# Patient Record
Sex: MALE | Race: OTHER | Employment: OTHER | URBAN - METROPOLITAN AREA
[De-identification: names, ages, dates, MRNs, and addresses within clinical notes are randomized per-mention and may not be internally consistent; named-entity substitution may affect disease eponyms.]

---

## 2017-06-16 ENCOUNTER — HOSPITAL ENCOUNTER (OUTPATIENT)
Age: 77
Discharge: HOME | End: 2017-06-16
Payer: MEDICARE

## 2017-06-16 DIAGNOSIS — N18.30 BENIGN HYPERTENSION WITH CKD (CHRONIC KIDNEY DISEASE) STAGE III (HCC): ICD-10-CM

## 2017-06-16 DIAGNOSIS — I12.9 BENIGN HYPERTENSION WITH CKD (CHRONIC KIDNEY DISEASE) STAGE III (HCC): ICD-10-CM

## 2017-06-16 DIAGNOSIS — N18.30 CKD (CHRONIC KIDNEY DISEASE) STAGE 3, GFR 30-59 ML/MIN (HCC): ICD-10-CM

## 2017-06-16 DIAGNOSIS — E78.00 PURE HYPERCHOLESTEROLEMIA: ICD-10-CM

## 2017-06-16 LAB
ABSOLUTE EOS #: 0.2 K/UL (ref 0–0.4)
ABSOLUTE LYMPH #: 1.4 K/UL (ref 1–4.8)
ABSOLUTE MONO #: 0.5 K/UL (ref 0.1–1.2)
ALBUMIN SERPL-MCNC: 4.2 G/DL (ref 3.5–5.2)
ALBUMIN/GLOBULIN RATIO: 1.3 (ref 1–2.5)
ALP BLD-CCNC: 95 U/L (ref 40–129)
ALT SERPL-CCNC: 35 U/L (ref 5–41)
ANION GAP SERPL CALCULATED.3IONS-SCNC: 14 MMOL/L (ref 9–17)
AST SERPL-CCNC: 23 U/L
BASOPHILS # BLD: 1 %
BASOPHILS ABSOLUTE: 0.1 K/UL (ref 0–0.2)
BILIRUB SERPL-MCNC: 0.5 MG/DL (ref 0.3–1.2)
BILIRUBIN DIRECT: 0.13 MG/DL
BILIRUBIN, INDIRECT: 0.37 MG/DL (ref 0–1)
BUN BLDV-MCNC: 13 MG/DL (ref 8–23)
BUN/CREAT BLD: ABNORMAL (ref 9–20)
CALCIUM SERPL-MCNC: 9.7 MG/DL (ref 8.6–10.4)
CHLORIDE BLD-SCNC: 102 MMOL/L (ref 98–107)
CO2: 24 MMOL/L (ref 20–31)
CREAT SERPL-MCNC: 1.33 MG/DL (ref 0.7–1.2)
DIFFERENTIAL TYPE: NORMAL
EOSINOPHILS RELATIVE PERCENT: 3 %
GFR AFRICAN AMERICAN: >60 ML/MIN
GFR NON-AFRICAN AMERICAN: 52 ML/MIN
GFR SERPL CREATININE-BSD FRML MDRD: ABNORMAL ML/MIN/{1.73_M2}
GFR SERPL CREATININE-BSD FRML MDRD: ABNORMAL ML/MIN/{1.73_M2}
GLOBULIN: NORMAL G/DL (ref 1.5–3.8)
GLUCOSE BLD-MCNC: 109 MG/DL (ref 70–99)
HCT VFR BLD CALC: 45.1 % (ref 41–53)
HEMOGLOBIN: 15.7 G/DL (ref 13.5–17.5)
LYMPHOCYTES # BLD: 23 %
MAGNESIUM: 2.3 MG/DL (ref 1.6–2.6)
MCH RBC QN AUTO: 31.8 PG (ref 26–34)
MCHC RBC AUTO-ENTMCNC: 34.8 G/DL (ref 31–37)
MCV RBC AUTO: 91.5 FL (ref 80–100)
MONOCYTES # BLD: 9 %
PDW BLD-RTO: 14.1 % (ref 12.5–15.4)
PHOSPHORUS: 3.4 MG/DL (ref 2.5–4.5)
PLATELET # BLD: 215 K/UL (ref 140–450)
PLATELET ESTIMATE: NORMAL
PMV BLD AUTO: 7.7 FL (ref 6–12)
POTASSIUM SERPL-SCNC: 4.6 MMOL/L (ref 3.7–5.3)
RBC # BLD: 4.93 M/UL (ref 4.5–5.9)
RBC # BLD: NORMAL 10*6/UL
SEG NEUTROPHILS: 64 %
SEGMENTED NEUTROPHILS ABSOLUTE COUNT: 3.9 K/UL (ref 1.8–7.7)
SODIUM BLD-SCNC: 140 MMOL/L (ref 135–144)
TOTAL PROTEIN: 7.4 G/DL (ref 6.4–8.3)
WBC # BLD: 6 K/UL (ref 3.5–11)
WBC # BLD: NORMAL 10*3/UL

## 2017-06-16 PROCEDURE — 80048 BASIC METABOLIC PNL TOTAL CA: CPT

## 2017-06-16 PROCEDURE — 36415 COLL VENOUS BLD VENIPUNCTURE: CPT

## 2017-06-16 PROCEDURE — 84100 ASSAY OF PHOSPHORUS: CPT

## 2017-06-16 PROCEDURE — 80061 LIPID PANEL: CPT

## 2017-06-16 PROCEDURE — 85025 COMPLETE CBC W/AUTO DIFF WBC: CPT

## 2017-06-16 PROCEDURE — 83735 ASSAY OF MAGNESIUM: CPT

## 2017-06-16 PROCEDURE — 80076 HEPATIC FUNCTION PANEL: CPT

## 2017-06-20 LAB
CHOLESTEROL/HDL RATIO: 3.5
CHOLESTEROL: 155 MG/DL
HDLC SERPL-MCNC: 44 MG/DL
LDL CHOLESTEROL: 90 MG/DL (ref 0–130)
TRIGL SERPL-MCNC: 105 MG/DL
VLDLC SERPL CALC-MCNC: NORMAL MG/DL (ref 1–30)

## 2017-08-08 ENCOUNTER — CARE COORDINATION (OUTPATIENT)
Dept: CARE COORDINATION | Age: 77
End: 2017-08-08

## 2017-08-11 ENCOUNTER — CARE COORDINATION (OUTPATIENT)
Dept: CARE COORDINATION | Age: 77
End: 2017-08-11

## 2017-08-15 ENCOUNTER — CARE COORDINATION (OUTPATIENT)
Dept: CARE COORDINATION | Age: 77
End: 2017-08-15

## 2017-08-17 ENCOUNTER — CARE COORDINATION (OUTPATIENT)
Dept: CARE COORDINATION | Age: 77
End: 2017-08-17

## 2017-08-23 ENCOUNTER — CARE COORDINATION (OUTPATIENT)
Dept: CARE COORDINATION | Age: 77
End: 2017-08-23

## 2017-08-24 ENCOUNTER — CARE COORDINATION (OUTPATIENT)
Dept: CARE COORDINATION | Age: 77
End: 2017-08-24

## 2017-08-29 ENCOUNTER — CARE COORDINATION (OUTPATIENT)
Dept: CARE COORDINATION | Age: 77
End: 2017-08-29

## 2017-09-14 ENCOUNTER — CARE COORDINATION (OUTPATIENT)
Dept: CARE COORDINATION | Age: 77
End: 2017-09-14

## 2017-09-15 ENCOUNTER — OFFICE VISIT (OUTPATIENT)
Dept: GASTROENTEROLOGY | Age: 77
End: 2017-09-15
Payer: MEDICARE

## 2017-09-15 VITALS
OXYGEN SATURATION: 96 % | SYSTOLIC BLOOD PRESSURE: 134 MMHG | TEMPERATURE: 97.1 F | HEIGHT: 67 IN | BODY MASS INDEX: 28.11 KG/M2 | DIASTOLIC BLOOD PRESSURE: 71 MMHG | WEIGHT: 179.1 LBS | HEART RATE: 58 BPM

## 2017-09-15 DIAGNOSIS — Z86.010 HISTORY OF COLON POLYPS: Primary | ICD-10-CM

## 2017-09-15 DIAGNOSIS — R19.8 TENESMUS: ICD-10-CM

## 2017-09-15 DIAGNOSIS — R10.84 GENERALIZED ABDOMINAL PAIN: ICD-10-CM

## 2017-09-15 PROCEDURE — 99203 OFFICE O/P NEW LOW 30 MIN: CPT | Performed by: INTERNAL MEDICINE

## 2017-09-15 ASSESSMENT — ENCOUNTER SYMPTOMS
RECTAL PAIN: 0
SINUS PRESSURE: 0
VOMITING: 0
GASTROINTESTINAL NEGATIVE: 1
RESPIRATORY NEGATIVE: 1
CONSTIPATION: 0
SORE THROAT: 0
BACK PAIN: 1
NAUSEA: 0
ALLERGIC/IMMUNOLOGIC NEGATIVE: 1
BLOOD IN STOOL: 0
TROUBLE SWALLOWING: 1
DIARRHEA: 0
ANAL BLEEDING: 0
ABDOMINAL PAIN: 0

## 2017-09-18 ENCOUNTER — CARE COORDINATION (OUTPATIENT)
Dept: CARE COORDINATION | Age: 77
End: 2017-09-18

## 2017-09-18 ENCOUNTER — TELEPHONE (OUTPATIENT)
Dept: FAMILY MEDICINE CLINIC | Facility: CLINIC | Age: 77
End: 2017-09-18

## 2017-09-18 PROBLEM — Z12.11 COLON CANCER SCREENING: Status: ACTIVE | Noted: 2017-09-18

## 2017-09-19 ENCOUNTER — HOSPITAL ENCOUNTER (OUTPATIENT)
Age: 77
Setting detail: OUTPATIENT SURGERY
Discharge: HOME | End: 2017-09-19
Attending: INTERNAL MEDICINE | Admitting: INTERNAL MEDICINE
Payer: MEDICARE

## 2017-09-19 VITALS
HEART RATE: 54 BPM | BODY MASS INDEX: 28.09 KG/M2 | HEIGHT: 67 IN | OXYGEN SATURATION: 99 % | TEMPERATURE: 97.5 F | WEIGHT: 179 LBS | SYSTOLIC BLOOD PRESSURE: 137 MMHG | RESPIRATION RATE: 16 BRPM | DIASTOLIC BLOOD PRESSURE: 74 MMHG

## 2017-09-19 PROCEDURE — 7100000011 HC PHASE II RECOVERY - ADDTL 15 MIN: Performed by: INTERNAL MEDICINE

## 2017-09-19 PROCEDURE — 99153 MOD SED SAME PHYS/QHP EA: CPT | Performed by: INTERNAL MEDICINE

## 2017-09-19 PROCEDURE — 6360000002 HC RX W HCPCS: Performed by: INTERNAL MEDICINE

## 2017-09-19 PROCEDURE — C1773 RET DEV, INSERTABLE: HCPCS | Performed by: INTERNAL MEDICINE

## 2017-09-19 PROCEDURE — 3609010600 HC COLONOSCOPY POLYPECTOMY SNARE/COLD BIOPSY: Performed by: INTERNAL MEDICINE

## 2017-09-19 PROCEDURE — 2580000003 HC RX 258: Performed by: INTERNAL MEDICINE

## 2017-09-19 PROCEDURE — 88305 TISSUE EXAM BY PATHOLOGIST: CPT

## 2017-09-19 PROCEDURE — 99152 MOD SED SAME PHYS/QHP 5/>YRS: CPT | Performed by: INTERNAL MEDICINE

## 2017-09-19 PROCEDURE — 7100000010 HC PHASE II RECOVERY - FIRST 15 MIN: Performed by: INTERNAL MEDICINE

## 2017-09-19 RX ORDER — MIDAZOLAM HYDROCHLORIDE 1 MG/ML
INJECTION INTRAMUSCULAR; INTRAVENOUS PRN
Status: DISCONTINUED | OUTPATIENT
Start: 2017-09-19 | End: 2017-09-19 | Stop reason: HOSPADM

## 2017-09-19 RX ORDER — METHYLENE BLUE 10 MG/ML
INJECTION INTRAVENOUS PRN
Status: DISCONTINUED | OUTPATIENT
Start: 2017-09-19 | End: 2017-09-19 | Stop reason: HOSPADM

## 2017-09-19 RX ORDER — SODIUM CHLORIDE 9 MG/ML
INJECTION, SOLUTION INTRAVENOUS CONTINUOUS
Status: DISCONTINUED | OUTPATIENT
Start: 2017-09-19 | End: 2017-09-19 | Stop reason: HOSPADM

## 2017-09-19 RX ORDER — FENTANYL CITRATE 50 UG/ML
INJECTION, SOLUTION INTRAMUSCULAR; INTRAVENOUS PRN
Status: DISCONTINUED | OUTPATIENT
Start: 2017-09-19 | End: 2017-09-19 | Stop reason: HOSPADM

## 2017-09-19 RX ADMIN — SODIUM CHLORIDE: 9 INJECTION, SOLUTION INTRAVENOUS at 09:57

## 2017-09-19 ASSESSMENT — PAIN - FUNCTIONAL ASSESSMENT: PAIN_FUNCTIONAL_ASSESSMENT: 0-10

## 2017-09-19 ASSESSMENT — PAIN SCALES - GENERAL
PAINLEVEL_OUTOF10: 0
PAINLEVEL_OUTOF10: 0

## 2017-09-19 NOTE — CARE COORDINATION
Julieta Maria NP   atorvastatin (LIPITOR) 40 MG tablet Take 1 tablet by mouth daily 8/14/17   Lawanda Rice NP   amLODIPine (NORVASC) 5 MG tablet Take 0.5 tablets by mouth daily 8/14/17   Lawanda Rice NP   lansoprazole (PREVACID) 30 MG delayed release capsule Take 1 capsule by mouth daily 8/11/17   Lawanda Rice NP   ammonium lactate (LAC-HYDRIN) 12 % lotion Apply topically daily. 11/15/16   Lawanda Rice NP   NITROSTAT 0.4 MG SL tablet Place 0.4 mg under the tongue every 5 minutes as needed  3/13/15   Historical Provider, MD   doxepin (SINEQUAN) 100 MG capsule Take 100 mg by mouth nightly. Historical Provider, MD   amitriptyline (ELAVIL) 10 MG tablet Take 10 mg by mouth nightly.       Historical Provider, MD       Future Appointments  Date Time Provider Shireen Charles   12/14/2017 10:15 AM Lawanda Rice NP [facility]

## 2017-09-20 ENCOUNTER — CARE COORDINATION (OUTPATIENT)
Dept: CARE COORDINATION | Age: 77
End: 2017-09-20

## 2017-09-21 LAB — SURGICAL PATHOLOGY REPORT: NORMAL

## 2017-10-02 DIAGNOSIS — R19.8 TENESMUS: ICD-10-CM

## 2017-10-02 DIAGNOSIS — Z86.010 HISTORY OF COLON POLYPS: ICD-10-CM

## 2017-10-12 ENCOUNTER — CARE COORDINATION (OUTPATIENT)
Dept: CARE COORDINATION | Age: 77
End: 2017-10-12

## 2017-10-12 RX ORDER — BUTALBITAL, ACETAMINOPHEN AND CAFFEINE 50; 325; 40 MG/1; MG/1; MG/1
1 TABLET ORAL EVERY 6 HOURS PRN
COMMUNITY
End: 2021-04-14 | Stop reason: SDUPTHER

## 2017-10-13 ENCOUNTER — CARE COORDINATOR VISIT (OUTPATIENT)
Dept: CARE COORDINATION | Age: 77
End: 2017-10-13

## 2017-11-09 ENCOUNTER — CARE COORDINATION (OUTPATIENT)
Dept: CARE COORDINATION | Age: 77
End: 2017-11-09

## 2017-11-09 NOTE — CARE COORDINATION
Michael Joseph NP   amLODIPine (NORVASC) 5 MG tablet Take 0.5 tablets by mouth daily 8/14/17   Michael Joseph NP   lansoprazole (PREVACID) 30 MG delayed release capsule Take 1 capsule by mouth daily 8/11/17   Michael Joseph NP   ammonium lactate (LAC-HYDRIN) 12 % lotion Apply topically daily. 11/15/16   Michael Joseph NP   NITROSTAT 0.4 MG SL tablet Place 0.4 mg under the tongue every 5 minutes as needed  3/13/15   Historical Provider, MD   doxepin (SINEQUAN) 100 MG capsule Take 100 mg by mouth nightly. Historical Provider, MD   amitriptyline (ELAVIL) 10 MG tablet Take 10 mg by mouth nightly.       Historical Provider, MD       Future Appointments  Date Time Provider Shireen Charles   12/14/2017 10:15 AM Mihcael Joseph NP [facility] [address] [facility]

## 2017-11-13 ENCOUNTER — TELEPHONE (OUTPATIENT)
Dept: GASTROENTEROLOGY | Age: 77
End: 2017-11-13

## 2017-11-16 ENCOUNTER — OFFICE VISIT (OUTPATIENT)
Dept: GASTROENTEROLOGY | Age: 77
End: 2017-11-16
Payer: MEDICARE

## 2017-11-16 VITALS
WEIGHT: 180.1 LBS | TEMPERATURE: 98.1 F | BODY MASS INDEX: 28.27 KG/M2 | HEIGHT: 67 IN | SYSTOLIC BLOOD PRESSURE: 135 MMHG | OXYGEN SATURATION: 95 % | DIASTOLIC BLOOD PRESSURE: 76 MMHG | HEART RATE: 80 BPM

## 2017-11-16 DIAGNOSIS — D12.6 TUBULAR ADENOMA OF COLON: Primary | ICD-10-CM

## 2017-11-16 DIAGNOSIS — K57.30 DIVERTICULOSIS OF COLON: ICD-10-CM

## 2017-11-16 PROCEDURE — 99214 OFFICE O/P EST MOD 30 MIN: CPT | Performed by: INTERNAL MEDICINE

## 2017-11-16 ASSESSMENT — ENCOUNTER SYMPTOMS
ANAL BLEEDING: 0
SORE THROAT: 0
ALLERGIC/IMMUNOLOGIC NEGATIVE: 1
ABDOMINAL PAIN: 0
SINUS PRESSURE: 0
SHORTNESS OF BREATH: 0
COUGH: 0
VOMITING: 0
GASTROINTESTINAL NEGATIVE: 1
RHINORRHEA: 0
BACK PAIN: 0
TROUBLE SWALLOWING: 0
ABDOMINAL DISTENTION: 0
CONSTIPATION: 0
VOICE CHANGE: 0
RECTAL PAIN: 0
RESPIRATORY NEGATIVE: 1
WHEEZING: 0
SINUS PAIN: 0
DIARRHEA: 0
FACIAL SWELLING: 0
BLOOD IN STOOL: 0
NAUSEA: 0

## 2017-11-16 NOTE — PROGRESS NOTES
Subjective:      Patient ID: Randolph Simmons is a 65 y.o. male. HPI   Dr. Eduardo Chang, NP our mutual patient Randolph Simmons was seen  for   1. Tubular adenoma of colon    2. Diverticulosis of colon      Patient had colonoscopy done and was found to have multiple polyps. At least 5 tubular adenomas removed from right colon and also transverse colon. No dysplasia noted. No colitis identified. Following colonoscopy he had transient tenesmus. The symptom last over a period of 15-20 minutes. No recurrent symptoms in the last 4-5 weeks. He is having good bowel movements. No melena or hematochezia. No fever or chills. Denies significant constipation. Past Medical, Family, and Social History reviewed and does contribute to the patient presenting condition. patient\"s PMH/PSH,SH,PSYCH hx, MEDs, ALLERGIES, and ROS was all reviewed and updated ion the appropriate sections      Review of Systems   Constitutional: Positive for fatigue. Negative for activity change, appetite change, chills, diaphoresis, fever and unexpected weight change. HENT: Positive for dental problem, hearing loss and postnasal drip. Negative for congestion, drooling, ear discharge, ear pain, facial swelling, mouth sores, nosebleeds, rhinorrhea, sinus pain, sinus pressure, sneezing, sore throat, tinnitus, trouble swallowing and voice change. Eyes: Positive for visual disturbance (glasses ). Respiratory: Negative. Negative for cough, shortness of breath and wheezing. Cardiovascular: Negative for chest pain, palpitations and leg swelling. Gastrointestinal: Negative. Negative for abdominal distention, abdominal pain, anal bleeding, blood in stool, constipation, diarrhea, nausea, rectal pain (one time after colonoscopy) and vomiting. Endocrine: Negative. Negative for polydipsia, polyphagia and polyuria. Genitourinary: Negative. Negative for difficulty urinating, dysuria, frequency and hematuria.    Musculoskeletal: Negative for arthralgias, back pain, neck pain and neck stiffness. Skin: Negative. Allergic/Immunologic: Negative. Neurological: Positive for headaches. Negative for dizziness, tremors, seizures, syncope, facial asymmetry, speech difficulty, weakness, light-headedness and numbness. Hematological: Negative. Negative for adenopathy. Does not bruise/bleed easily. Psychiatric/Behavioral: Negative. Negative for sleep disturbance. The patient is not nervous/anxious. Objective:   Physical Exam   Constitutional: He is oriented to person, place, and time. He appears well-developed and well-nourished. No distress. Over weight   HENT:   Head: Normocephalic and atraumatic. Mouth/Throat: No oropharyngeal exudate. Eyes: Conjunctivae are normal. Pupils are equal, round, and reactive to light. No scleral icterus. Neck: Normal range of motion. Neck supple. No tracheal deviation present. No thyromegaly present. Cardiovascular: Normal rate, regular rhythm, normal heart sounds and intact distal pulses. No murmur heard. Pulmonary/Chest: Effort normal and breath sounds normal. No respiratory distress. He has no wheezes. He has no rales. Abdominal: Soft. Bowel sounds are normal. He exhibits no distension, no ascites and no mass. There is no hepatomegaly. There is no tenderness. There is no rebound and no guarding. No hernia. Obese abdomin   Genitourinary: Rectum normal.   Musculoskeletal: He exhibits no edema. Lymphadenopathy:     He has no cervical adenopathy. Neurological: He is alert and oriented to person, place, and time. No cranial nerve deficit. Skin: Skin is warm and dry. No rash noted. No erythema. Psychiatric: Thought content normal.   Nursing note and vitals reviewed. Assessment:      1. Tubular adenoma of colon     2. Diverticulosis of colon             Plan:      Patient is explained regarding colonoscopy findings and histology results. His rectal examination nonspecific.

## 2017-12-04 ENCOUNTER — CARE COORDINATION (OUTPATIENT)
Dept: CARE COORDINATION | Age: 77
End: 2017-12-04

## 2017-12-04 NOTE — CARE COORDINATION
Ambulatory Care Coordination Note  12/4/2017  CM Risk Score: 2  [place] Mortality Risk Score: 10.44    ACC: Verena Stagegregor    Summary Note: Mr. Josue April is currently waiting to see the psychiatrist. He reports he is scheduled for a cataract removal to his L eye with Dr. Amy Duque tomorrow. He reports he is taking his medications as prescribed. CC Plan:   Follow up with Mr. Josue April Thursday or Friday after his surgery. Care Coordination Interventions    Program Enrollment:  Rising Risk  Referral from Primary Care Provider:  No  Suggested Interventions and Community Resources  Medi Set or Pill Pack: In Process         Goals Addressed     None          Prior to Admission medications    Medication Sig Start Date End Date Taking? Authorizing Provider   butalbital-acetaminophen-caffeine (FIORICET, ESGIC) -40 MG per tablet Take 1 tablet by mouth every 6 hours as needed for Headaches    Historical Provider, MD   Handicap Placard [address] [facility] It is my medical opinion that Amena Pedersen requires a disability parking placard for the following reasons:  He cannot walk without assistance from another person or the use of an assistance device (cane, crutch, prosthetic device, wheelchair, etc.). Duration of need: permanent 9/5/17   Irineo Amaral NP   isosorbide mononitrate (IMDUR) 30 MG extended release tablet Take 1 tablet by mouth daily 8/14/17   Irineo Amaral NP   finasteride (PROSCAR) 5 MG tablet Take 1 tablet by mouth daily 8/14/17   Irineo Amaral NP   atorvastatin (LIPITOR) 40 MG tablet Take 1 tablet by mouth daily 8/14/17   Irineo Amaral NP   amLODIPine (NORVASC) 5 MG tablet Take 0.5 tablets by mouth daily 8/14/17   Irineo Amaral NP   lansoprazole (PREVACID) 30 MG delayed release capsule Take 1 capsule by mouth daily 8/11/17   Irineo Amaral NP   ammonium lactate (LAC-HYDRIN) 12 % lotion Apply topically daily.  11/15/16   Irineo Amaral NP   NITROSTAT 0.4 MG SL tablet Place 0.4 mg under the tongue every 5 minutes as needed  3/13/15   Historical Provider, MD   doxepin (SINEQUAN) 100 MG capsule Take 100 mg by mouth nightly. Historical Provider, MD   amitriptyline (ELAVIL) 10 MG tablet Take 10 mg by mouth nightly.       Historical Provider, MD       Future Appointments  Date Time Provider Shireen Charles   12/14/2017 10:15 AM ALEX Sweet  [facility]   11/19/2018 1:00 PM Laura Aguilar MD [address] [place] [address]

## 2018-01-08 PROBLEM — Z87.81 HISTORY OF SKULL FRACTURE: Status: ACTIVE | Noted: 2018-01-08

## 2018-01-16 ENCOUNTER — CARE COORDINATION (OUTPATIENT)
Dept: CARE COORDINATION | Age: 78
End: 2018-01-16

## 2018-03-15 ENCOUNTER — CARE COORDINATION (OUTPATIENT)
Dept: CARE COORDINATION | Age: 78
End: 2018-03-15

## 2018-04-12 PROBLEM — Z12.11 COLON CANCER SCREENING: Status: RESOLVED | Noted: 2017-09-18 | Resolved: 2018-04-12

## 2018-04-24 ENCOUNTER — CARE COORDINATION (OUTPATIENT)
Dept: CARE COORDINATION | Age: 78
End: 2018-04-24

## 2018-04-26 ENCOUNTER — TELEPHONE (OUTPATIENT)
Dept: GASTROENTEROLOGY | Age: 78
End: 2018-04-26

## 2018-05-16 ENCOUNTER — HOSPITAL ENCOUNTER (INPATIENT)
Age: 78
LOS: 2 days | Discharge: HOME | DRG: 247 | End: 2018-05-18
Attending: EMERGENCY MEDICINE | Admitting: INTERNAL MEDICINE
Payer: MEDICARE

## 2018-05-16 ENCOUNTER — APPOINTMENT (OUTPATIENT)
Dept: GENERAL RADIOLOGY | Age: 78
DRG: 247 | End: 2018-05-16
Payer: MEDICARE

## 2018-05-16 DIAGNOSIS — I21.4 NSTEMI (NON-ST ELEVATED MYOCARDIAL INFARCTION) (HCC): Primary | ICD-10-CM

## 2018-05-16 LAB
ABSOLUTE EOS #: 0.2 K/UL (ref 0–0.44)
ABSOLUTE IMMATURE GRANULOCYTE: <0.03 K/UL (ref 0–0.3)
ABSOLUTE LYMPH #: 1.63 K/UL (ref 1.1–3.7)
ABSOLUTE MONO #: 0.58 K/UL (ref 0.1–1.2)
ANION GAP SERPL CALCULATED.3IONS-SCNC: 14 MMOL/L (ref 9–17)
BASOPHILS # BLD: 0 % (ref 0–2)
BASOPHILS ABSOLUTE: 0.03 K/UL (ref 0–0.2)
BUN BLDV-MCNC: 12 MG/DL (ref 8–23)
BUN/CREAT BLD: ABNORMAL (ref 9–20)
CALCIUM SERPL-MCNC: 8.8 MG/DL (ref 8.6–10.4)
CHLORIDE BLD-SCNC: 97 MMOL/L (ref 98–107)
CO2: 23 MMOL/L (ref 20–31)
CREAT SERPL-MCNC: 1.41 MG/DL (ref 0.7–1.2)
DIFFERENTIAL TYPE: NORMAL
EKG ATRIAL RATE: 79 BPM
EKG P AXIS: 31 DEGREES
EKG P-R INTERVAL: 164 MS
EKG Q-T INTERVAL: 392 MS
EKG QRS DURATION: 78 MS
EKG QTC CALCULATION (BAZETT): 449 MS
EKG R AXIS: 47 DEGREES
EKG T AXIS: 64 DEGREES
EKG VENTRICULAR RATE: 79 BPM
EOSINOPHILS RELATIVE PERCENT: 3 % (ref 1–4)
GFR AFRICAN AMERICAN: 59 ML/MIN
GFR NON-AFRICAN AMERICAN: 49 ML/MIN
GFR SERPL CREATININE-BSD FRML MDRD: ABNORMAL ML/MIN/{1.73_M2}
GFR SERPL CREATININE-BSD FRML MDRD: ABNORMAL ML/MIN/{1.73_M2}
GLUCOSE BLD-MCNC: 171 MG/DL (ref 70–99)
HCT VFR BLD CALC: 43.1 % (ref 40.7–50.3)
HEMOGLOBIN: 14.9 G/DL (ref 13–17)
IMMATURE GRANULOCYTES: 0 %
LYMPHOCYTES # BLD: 24 % (ref 24–43)
MAGNESIUM: 2 MG/DL (ref 1.6–2.6)
MCH RBC QN AUTO: 31.9 PG (ref 25.2–33.5)
MCHC RBC AUTO-ENTMCNC: 34.6 G/DL (ref 28.4–34.8)
MCV RBC AUTO: 92.3 FL (ref 82.6–102.9)
MONOCYTES # BLD: 9 % (ref 3–12)
NRBC AUTOMATED: 0 PER 100 WBC
PARTIAL THROMBOPLASTIN TIME: 24.4 SEC (ref 20.5–30.5)
PDW BLD-RTO: 13.2 % (ref 11.8–14.4)
PLATELET # BLD: 212 K/UL (ref 138–453)
PLATELET ESTIMATE: NORMAL
PMV BLD AUTO: 9.3 FL (ref 8.1–13.5)
POC TROPONIN I: 0.82 NG/ML (ref 0–0.1)
POC TROPONIN INTERP: ABNORMAL
POTASSIUM SERPL-SCNC: 3.7 MMOL/L (ref 3.7–5.3)
RBC # BLD: 4.67 M/UL (ref 4.21–5.77)
RBC # BLD: NORMAL 10*6/UL
SEG NEUTROPHILS: 64 % (ref 36–65)
SEGMENTED NEUTROPHILS ABSOLUTE COUNT: 4.35 K/UL (ref 1.5–8.1)
SODIUM BLD-SCNC: 134 MMOL/L (ref 135–144)
TROPONIN INTERP: ABNORMAL
TROPONIN T: 0.11 NG/ML
WBC # BLD: 6.8 K/UL (ref 3.5–11.3)
WBC # BLD: NORMAL 10*3/UL

## 2018-05-16 PROCEDURE — 84484 ASSAY OF TROPONIN QUANT: CPT

## 2018-05-16 PROCEDURE — 85025 COMPLETE CBC W/AUTO DIFF WBC: CPT

## 2018-05-16 PROCEDURE — 2060000000 HC ICU INTERMEDIATE R&B

## 2018-05-16 PROCEDURE — 85027 COMPLETE CBC AUTOMATED: CPT

## 2018-05-16 PROCEDURE — 71045 X-RAY EXAM CHEST 1 VIEW: CPT

## 2018-05-16 PROCEDURE — 80048 BASIC METABOLIC PNL TOTAL CA: CPT

## 2018-05-16 PROCEDURE — 6360000002 HC RX W HCPCS: Performed by: STUDENT IN AN ORGANIZED HEALTH CARE EDUCATION/TRAINING PROGRAM

## 2018-05-16 PROCEDURE — 83735 ASSAY OF MAGNESIUM: CPT

## 2018-05-16 PROCEDURE — 84443 ASSAY THYROID STIM HORMONE: CPT

## 2018-05-16 PROCEDURE — 85730 THROMBOPLASTIN TIME PARTIAL: CPT

## 2018-05-16 PROCEDURE — 6370000000 HC RX 637 (ALT 250 FOR IP): Performed by: STUDENT IN AN ORGANIZED HEALTH CARE EDUCATION/TRAINING PROGRAM

## 2018-05-16 PROCEDURE — 93005 ELECTROCARDIOGRAM TRACING: CPT

## 2018-05-16 PROCEDURE — 80076 HEPATIC FUNCTION PANEL: CPT

## 2018-05-16 PROCEDURE — 83036 HEMOGLOBIN GLYCOSYLATED A1C: CPT

## 2018-05-16 PROCEDURE — 96374 THER/PROPH/DIAG INJ IV PUSH: CPT

## 2018-05-16 PROCEDURE — 80061 LIPID PANEL: CPT

## 2018-05-16 PROCEDURE — 99285 EMERGENCY DEPT VISIT HI MDM: CPT

## 2018-05-16 PROCEDURE — 6360000002 HC RX W HCPCS: Performed by: HOSPITALIST

## 2018-05-16 PROCEDURE — 2580000003 HC RX 258: Performed by: HOSPITALIST

## 2018-05-16 RX ORDER — ATORVASTATIN CALCIUM 10 MG/1
10 TABLET, FILM COATED ORAL NIGHTLY
Status: DISCONTINUED | OUTPATIENT
Start: 2018-05-16 | End: 2018-05-16 | Stop reason: SDUPTHER

## 2018-05-16 RX ORDER — ASPIRIN 81 MG/1
81 TABLET, CHEWABLE ORAL DAILY
Status: DISCONTINUED | OUTPATIENT
Start: 2018-05-17 | End: 2018-05-18 | Stop reason: HOSPADM

## 2018-05-16 RX ORDER — POTASSIUM CHLORIDE 20MEQ/15ML
40 LIQUID (ML) ORAL PRN
Status: DISCONTINUED | OUTPATIENT
Start: 2018-05-16 | End: 2018-05-18 | Stop reason: HOSPADM

## 2018-05-16 RX ORDER — FINASTERIDE 5 MG/1
5 TABLET, FILM COATED ORAL DAILY
Status: DISCONTINUED | OUTPATIENT
Start: 2018-05-17 | End: 2018-05-18 | Stop reason: HOSPADM

## 2018-05-16 RX ORDER — MORPHINE SULFATE 4 MG/ML
1 INJECTION, SOLUTION INTRAMUSCULAR; INTRAVENOUS
Status: DISCONTINUED | OUTPATIENT
Start: 2018-05-16 | End: 2018-05-18 | Stop reason: HOSPADM

## 2018-05-16 RX ORDER — HEPARIN SODIUM 1000 [USP'U]/ML
4000 INJECTION, SOLUTION INTRAVENOUS; SUBCUTANEOUS PRN
Status: DISCONTINUED | OUTPATIENT
Start: 2018-05-16 | End: 2018-05-17

## 2018-05-16 RX ORDER — HEPARIN SODIUM 1000 [USP'U]/ML
2000 INJECTION, SOLUTION INTRAVENOUS; SUBCUTANEOUS PRN
Status: DISCONTINUED | OUTPATIENT
Start: 2018-05-16 | End: 2018-05-17

## 2018-05-16 RX ORDER — SODIUM CHLORIDE 0.9 % (FLUSH) 0.9 %
10 SYRINGE (ML) INJECTION EVERY 12 HOURS SCHEDULED
Status: DISCONTINUED | OUTPATIENT
Start: 2018-05-16 | End: 2018-05-18 | Stop reason: HOSPADM

## 2018-05-16 RX ORDER — ASPIRIN 81 MG/1
81 TABLET, CHEWABLE ORAL DAILY
COMMUNITY
End: 2019-05-22

## 2018-05-16 RX ORDER — POTASSIUM CHLORIDE 7.45 MG/ML
10 INJECTION INTRAVENOUS PRN
Status: DISCONTINUED | OUTPATIENT
Start: 2018-05-16 | End: 2018-05-18 | Stop reason: HOSPADM

## 2018-05-16 RX ORDER — ASPIRIN 81 MG/1
324 TABLET, CHEWABLE ORAL ONCE
Status: COMPLETED | OUTPATIENT
Start: 2018-05-16 | End: 2018-05-16

## 2018-05-16 RX ORDER — SODIUM CHLORIDE 0.9 % (FLUSH) 0.9 %
10 SYRINGE (ML) INJECTION PRN
Status: DISCONTINUED | OUTPATIENT
Start: 2018-05-16 | End: 2018-05-18 | Stop reason: HOSPADM

## 2018-05-16 RX ORDER — ACETAMINOPHEN 325 MG/1
650 TABLET ORAL EVERY 4 HOURS PRN
Status: DISCONTINUED | OUTPATIENT
Start: 2018-05-16 | End: 2018-05-18 | Stop reason: HOSPADM

## 2018-05-16 RX ORDER — ACETAMINOPHEN 325 MG/1
650 TABLET ORAL EVERY 4 HOURS PRN
Status: DISCONTINUED | OUTPATIENT
Start: 2018-05-16 | End: 2018-05-16 | Stop reason: SDUPTHER

## 2018-05-16 RX ORDER — ATORVASTATIN CALCIUM 40 MG/1
40 TABLET, FILM COATED ORAL DAILY
Status: DISCONTINUED | OUTPATIENT
Start: 2018-05-17 | End: 2018-05-17

## 2018-05-16 RX ORDER — NITROGLYCERIN 0.4 MG/1
0.4 TABLET SUBLINGUAL EVERY 5 MIN PRN
Status: DISCONTINUED | OUTPATIENT
Start: 2018-05-16 | End: 2018-05-18 | Stop reason: HOSPADM

## 2018-05-16 RX ORDER — HEPARIN SODIUM 10000 [USP'U]/100ML
12 INJECTION, SOLUTION INTRAVENOUS CONTINUOUS
Status: DISCONTINUED | OUTPATIENT
Start: 2018-05-16 | End: 2018-05-17

## 2018-05-16 RX ORDER — AMITRIPTYLINE HYDROCHLORIDE 10 MG/1
10 TABLET, FILM COATED ORAL NIGHTLY
Status: DISCONTINUED | OUTPATIENT
Start: 2018-05-17 | End: 2018-05-18 | Stop reason: HOSPADM

## 2018-05-16 RX ORDER — SODIUM CHLORIDE 9 MG/ML
INJECTION, SOLUTION INTRAVENOUS CONTINUOUS
Status: DISCONTINUED | OUTPATIENT
Start: 2018-05-16 | End: 2018-05-18 | Stop reason: HOSPADM

## 2018-05-16 RX ORDER — POTASSIUM CHLORIDE 20 MEQ/1
40 TABLET, EXTENDED RELEASE ORAL PRN
Status: DISCONTINUED | OUTPATIENT
Start: 2018-05-16 | End: 2018-05-18 | Stop reason: HOSPADM

## 2018-05-16 RX ORDER — ONDANSETRON 2 MG/ML
4 INJECTION INTRAMUSCULAR; INTRAVENOUS EVERY 6 HOURS PRN
Status: DISCONTINUED | OUTPATIENT
Start: 2018-05-16 | End: 2018-05-18 | Stop reason: HOSPADM

## 2018-05-16 RX ORDER — PANTOPRAZOLE SODIUM 40 MG/1
40 TABLET, DELAYED RELEASE ORAL
Status: DISCONTINUED | OUTPATIENT
Start: 2018-05-17 | End: 2018-05-18 | Stop reason: HOSPADM

## 2018-05-16 RX ADMIN — HEPARIN SODIUM 2000 UNITS: 1000 INJECTION, SOLUTION INTRAVENOUS; SUBCUTANEOUS at 22:10

## 2018-05-16 RX ADMIN — ASPIRIN 81 MG 324 MG: 81 TABLET ORAL at 22:08

## 2018-05-16 RX ADMIN — ATORVASTATIN CALCIUM 10 MG: 10 TABLET, FILM COATED ORAL at 22:09

## 2018-05-16 RX ADMIN — SODIUM CHLORIDE: 9 INJECTION, SOLUTION INTRAVENOUS at 23:41

## 2018-05-16 RX ADMIN — HEPARIN SODIUM AND DEXTROSE 12 UNITS/KG/HR: 10000; 5 INJECTION INTRAVENOUS at 22:11

## 2018-05-16 RX ADMIN — MORPHINE SULFATE 1 MG: 4 INJECTION INTRAVENOUS at 23:37

## 2018-05-16 ASSESSMENT — PAIN SCALES - GENERAL
PAINLEVEL_OUTOF10: 5

## 2018-05-16 ASSESSMENT — PAIN DESCRIPTION - DESCRIPTORS: DESCRIPTORS: ACHING

## 2018-05-16 ASSESSMENT — ENCOUNTER SYMPTOMS
WHEEZING: 0
NAUSEA: 0
PHOTOPHOBIA: 0
SHORTNESS OF BREATH: 0
VOMITING: 0
RHINORRHEA: 0
ABDOMINAL DISTENTION: 0
BACK PAIN: 0
BLOOD IN STOOL: 0
SORE THROAT: 0
COUGH: 0
ABDOMINAL PAIN: 0

## 2018-05-16 ASSESSMENT — PAIN DESCRIPTION - PAIN TYPE
TYPE: ACUTE PAIN
TYPE: ACUTE PAIN

## 2018-05-16 ASSESSMENT — PAIN DESCRIPTION - FREQUENCY
FREQUENCY: INTERMITTENT
FREQUENCY: INTERMITTENT

## 2018-05-16 ASSESSMENT — PAIN DESCRIPTION - LOCATION
LOCATION: CHEST
LOCATION: CHEST

## 2018-05-17 ENCOUNTER — APPOINTMENT (OUTPATIENT)
Dept: CARDIAC CATH/INVASIVE PROCEDURES | Age: 78
DRG: 247 | End: 2018-05-17
Payer: MEDICARE

## 2018-05-17 LAB
ABSOLUTE EOS #: 0.27 K/UL (ref 0–0.44)
ABSOLUTE IMMATURE GRANULOCYTE: <0.03 K/UL (ref 0–0.3)
ABSOLUTE LYMPH #: 2.01 K/UL (ref 1.1–3.7)
ABSOLUTE MONO #: 0.7 K/UL (ref 0.1–1.2)
ALBUMIN SERPL-MCNC: 3.8 G/DL (ref 3.5–5.2)
ALBUMIN/GLOBULIN RATIO: 1.4 (ref 1–2.5)
ALP BLD-CCNC: 95 U/L (ref 40–129)
ALT SERPL-CCNC: 32 U/L (ref 5–41)
ANION GAP SERPL CALCULATED.3IONS-SCNC: 14 MMOL/L (ref 9–17)
AST SERPL-CCNC: 39 U/L
BASOPHILS # BLD: 0 % (ref 0–2)
BASOPHILS ABSOLUTE: 0.03 K/UL (ref 0–0.2)
BILIRUB SERPL-MCNC: 0.32 MG/DL (ref 0.3–1.2)
BILIRUBIN DIRECT: 0.08 MG/DL
BILIRUBIN, INDIRECT: 0.24 MG/DL (ref 0–1)
BUN BLDV-MCNC: 13 MG/DL (ref 8–23)
BUN/CREAT BLD: ABNORMAL (ref 9–20)
CALCIUM SERPL-MCNC: 8.4 MG/DL (ref 8.6–10.4)
CHLORIDE BLD-SCNC: 99 MMOL/L (ref 98–107)
CHOLESTEROL/HDL RATIO: 2.7
CHOLESTEROL: 113 MG/DL
CO2: 22 MMOL/L (ref 20–31)
CREAT SERPL-MCNC: 1.34 MG/DL (ref 0.7–1.2)
DIFFERENTIAL TYPE: NORMAL
EOSINOPHILS RELATIVE PERCENT: 4 % (ref 1–4)
ESTIMATED AVERAGE GLUCOSE: 128 MG/DL
GFR AFRICAN AMERICAN: >60 ML/MIN
GFR NON-AFRICAN AMERICAN: 52 ML/MIN
GFR SERPL CREATININE-BSD FRML MDRD: ABNORMAL ML/MIN/{1.73_M2}
GFR SERPL CREATININE-BSD FRML MDRD: ABNORMAL ML/MIN/{1.73_M2}
GLOBULIN: NORMAL G/DL (ref 1.5–3.8)
GLUCOSE BLD-MCNC: 118 MG/DL (ref 70–99)
HBA1C MFR BLD: 6.1 % (ref 4–6)
HCT VFR BLD CALC: 43.6 % (ref 40.7–50.3)
HCT VFR BLD CALC: 43.7 % (ref 40.7–50.3)
HDLC SERPL-MCNC: 42 MG/DL
HEMOGLOBIN: 14.4 G/DL (ref 13–17)
HEMOGLOBIN: 14.8 G/DL (ref 13–17)
IMMATURE GRANULOCYTES: 0 %
LDL CHOLESTEROL: 58 MG/DL (ref 0–130)
LV EF: 43 %
LVEF MODALITY: NORMAL
LYMPHOCYTES # BLD: 27 % (ref 24–43)
MCH RBC QN AUTO: 30.8 PG (ref 25.2–33.5)
MCH RBC QN AUTO: 31.8 PG (ref 25.2–33.5)
MCHC RBC AUTO-ENTMCNC: 33 G/DL (ref 28.4–34.8)
MCHC RBC AUTO-ENTMCNC: 33.9 G/DL (ref 28.4–34.8)
MCV RBC AUTO: 93.6 FL (ref 82.6–102.9)
MCV RBC AUTO: 93.8 FL (ref 82.6–102.9)
MONOCYTES # BLD: 10 % (ref 3–12)
NRBC AUTOMATED: 0 PER 100 WBC
NRBC AUTOMATED: 0 PER 100 WBC
PARTIAL THROMBOPLASTIN TIME: 46.8 SEC (ref 20.5–30.5)
PARTIAL THROMBOPLASTIN TIME: 47.5 SEC (ref 20.5–30.5)
PARTIAL THROMBOPLASTIN TIME: >120 SEC (ref 20.5–30.5)
PDW BLD-RTO: 13.3 % (ref 11.8–14.4)
PDW BLD-RTO: 13.6 % (ref 11.8–14.4)
PLATELET # BLD: 210 K/UL (ref 138–453)
PLATELET # BLD: 211 K/UL (ref 138–453)
PLATELET ESTIMATE: NORMAL
PMV BLD AUTO: 9.2 FL (ref 8.1–13.5)
PMV BLD AUTO: 9.8 FL (ref 8.1–13.5)
POTASSIUM SERPL-SCNC: 3.6 MMOL/L (ref 3.7–5.3)
RBC # BLD: 4.65 M/UL (ref 4.21–5.77)
RBC # BLD: 4.67 M/UL (ref 4.21–5.77)
RBC # BLD: NORMAL 10*6/UL
SEG NEUTROPHILS: 59 % (ref 36–65)
SEGMENTED NEUTROPHILS ABSOLUTE COUNT: 4.34 K/UL (ref 1.5–8.1)
SODIUM BLD-SCNC: 135 MMOL/L (ref 135–144)
THYROXINE, FREE: 1.11 NG/DL (ref 0.93–1.7)
TOTAL PROTEIN: 6.5 G/DL (ref 6.4–8.3)
TRIGL SERPL-MCNC: 67 MG/DL
TROPONIN INTERP: ABNORMAL
TROPONIN T: 0.17 NG/ML
TROPONIN T: 0.17 NG/ML
TROPONIN T: 1.38 NG/ML
TSH SERPL DL<=0.05 MIU/L-ACNC: 6.91 MIU/L (ref 0.3–5)
VLDLC SERPL CALC-MCNC: NORMAL MG/DL (ref 1–30)
WBC # BLD: 7.4 K/UL (ref 3.5–11.3)
WBC # BLD: 8.4 K/UL (ref 3.5–11.3)
WBC # BLD: NORMAL 10*3/UL

## 2018-05-17 PROCEDURE — 6370000000 HC RX 637 (ALT 250 FOR IP): Performed by: HOSPITALIST

## 2018-05-17 PROCEDURE — C1874 STENT, COATED/COV W/DEL SYS: HCPCS

## 2018-05-17 PROCEDURE — C1887 CATHETER, GUIDING: HCPCS

## 2018-05-17 PROCEDURE — 02C03ZZ EXTIRPATION OF MATTER FROM CORONARY ARTERY, ONE ARTERY, PERCUTANEOUS APPROACH: ICD-10-PCS | Performed by: INTERNAL MEDICINE

## 2018-05-17 PROCEDURE — 93454 CORONARY ARTERY ANGIO S&I: CPT | Performed by: INTERNAL MEDICINE

## 2018-05-17 PROCEDURE — B2111ZZ FLUOROSCOPY OF MULTIPLE CORONARY ARTERIES USING LOW OSMOLAR CONTRAST: ICD-10-PCS | Performed by: INTERNAL MEDICINE

## 2018-05-17 PROCEDURE — C1757 CATH, THROMBECTOMY/EMBOLECT: HCPCS

## 2018-05-17 PROCEDURE — 99222 1ST HOSP IP/OBS MODERATE 55: CPT | Performed by: INTERNAL MEDICINE

## 2018-05-17 PROCEDURE — 92928 PRQ TCAT PLMT NTRAC ST 1 LES: CPT | Performed by: INTERNAL MEDICINE

## 2018-05-17 PROCEDURE — 6370000000 HC RX 637 (ALT 250 FOR IP)

## 2018-05-17 PROCEDURE — 97162 PT EVAL MOD COMPLEX 30 MIN: CPT

## 2018-05-17 PROCEDURE — 6360000002 HC RX W HCPCS

## 2018-05-17 PROCEDURE — 85730 THROMBOPLASTIN TIME PARTIAL: CPT

## 2018-05-17 PROCEDURE — 84484 ASSAY OF TROPONIN QUANT: CPT

## 2018-05-17 PROCEDURE — 93306 TTE W/DOPPLER COMPLETE: CPT

## 2018-05-17 PROCEDURE — 93005 ELECTROCARDIOGRAM TRACING: CPT

## 2018-05-17 PROCEDURE — 6360000004 HC RX CONTRAST MEDICATION

## 2018-05-17 PROCEDURE — 2580000003 HC RX 258: Performed by: INTERNAL MEDICINE

## 2018-05-17 PROCEDURE — 6360000002 HC RX W HCPCS: Performed by: HOSPITALIST

## 2018-05-17 PROCEDURE — 2709999900 HC NON-CHARGEABLE SUPPLY

## 2018-05-17 PROCEDURE — 2500000003 HC RX 250 WO HCPCS

## 2018-05-17 PROCEDURE — C1769 GUIDE WIRE: HCPCS

## 2018-05-17 PROCEDURE — G8979 MOBILITY GOAL STATUS: HCPCS

## 2018-05-17 PROCEDURE — G8978 MOBILITY CURRENT STATUS: HCPCS

## 2018-05-17 PROCEDURE — 2060000000 HC ICU INTERMEDIATE R&B

## 2018-05-17 PROCEDURE — 027034Z DILATION OF CORONARY ARTERY, ONE ARTERY WITH DRUG-ELUTING INTRALUMINAL DEVICE, PERCUTANEOUS APPROACH: ICD-10-PCS | Performed by: INTERNAL MEDICINE

## 2018-05-17 PROCEDURE — 36415 COLL VENOUS BLD VENIPUNCTURE: CPT

## 2018-05-17 PROCEDURE — 85025 COMPLETE CBC W/AUTO DIFF WBC: CPT

## 2018-05-17 PROCEDURE — 6360000002 HC RX W HCPCS: Performed by: INTERNAL MEDICINE

## 2018-05-17 PROCEDURE — B2151ZZ FLUOROSCOPY OF LEFT HEART USING LOW OSMOLAR CONTRAST: ICD-10-PCS | Performed by: INTERNAL MEDICINE

## 2018-05-17 PROCEDURE — 80048 BASIC METABOLIC PNL TOTAL CA: CPT

## 2018-05-17 PROCEDURE — C1760 CLOSURE DEV, VASC: HCPCS

## 2018-05-17 PROCEDURE — C1725 CATH, TRANSLUMIN NON-LASER: HCPCS

## 2018-05-17 PROCEDURE — 4A023N7 MEASUREMENT OF CARDIAC SAMPLING AND PRESSURE, LEFT HEART, PERCUTANEOUS APPROACH: ICD-10-PCS | Performed by: INTERNAL MEDICINE

## 2018-05-17 PROCEDURE — 2500000003 HC RX 250 WO HCPCS: Performed by: STUDENT IN AN ORGANIZED HEALTH CARE EDUCATION/TRAINING PROGRAM

## 2018-05-17 PROCEDURE — C1894 INTRO/SHEATH, NON-LASER: HCPCS

## 2018-05-17 PROCEDURE — 84439 ASSAY OF FREE THYROXINE: CPT

## 2018-05-17 RX ORDER — SODIUM CHLORIDE 0.9 % (FLUSH) 0.9 %
10 SYRINGE (ML) INJECTION EVERY 12 HOURS SCHEDULED
Status: DISCONTINUED | OUTPATIENT
Start: 2018-05-17 | End: 2018-05-18 | Stop reason: HOSPADM

## 2018-05-17 RX ORDER — METHYLPREDNISOLONE SODIUM SUCCINATE 125 MG/2ML
125 INJECTION, POWDER, LYOPHILIZED, FOR SOLUTION INTRAMUSCULAR; INTRAVENOUS ONCE
Status: COMPLETED | OUTPATIENT
Start: 2018-05-17 | End: 2018-05-17

## 2018-05-17 RX ORDER — ATORVASTATIN CALCIUM 40 MG/1
40 TABLET, FILM COATED ORAL NIGHTLY
Status: DISCONTINUED | OUTPATIENT
Start: 2018-05-17 | End: 2018-05-18 | Stop reason: HOSPADM

## 2018-05-17 RX ORDER — SODIUM CHLORIDE 0.9 % (FLUSH) 0.9 %
10 SYRINGE (ML) INJECTION PRN
Status: DISCONTINUED | OUTPATIENT
Start: 2018-05-17 | End: 2018-05-18 | Stop reason: HOSPADM

## 2018-05-17 RX ORDER — DIPHENHYDRAMINE HYDROCHLORIDE 50 MG/ML
50 INJECTION INTRAMUSCULAR; INTRAVENOUS ONCE
Status: COMPLETED | OUTPATIENT
Start: 2018-05-17 | End: 2018-05-17

## 2018-05-17 RX ORDER — NITROGLYCERIN 20 MG/100ML
5 INJECTION INTRAVENOUS CONTINUOUS
Status: DISCONTINUED | OUTPATIENT
Start: 2018-05-17 | End: 2018-05-18 | Stop reason: HOSPADM

## 2018-05-17 RX ORDER — SODIUM CHLORIDE 9 MG/ML
INJECTION, SOLUTION INTRAVENOUS CONTINUOUS
Status: DISCONTINUED | OUTPATIENT
Start: 2018-05-17 | End: 2018-05-18 | Stop reason: HOSPADM

## 2018-05-17 RX ORDER — 0.9 % SODIUM CHLORIDE 0.9 %
250 INTRAVENOUS SOLUTION INTRAVENOUS ONCE
Status: COMPLETED | OUTPATIENT
Start: 2018-05-17 | End: 2018-05-17

## 2018-05-17 RX ORDER — ACETAMINOPHEN 325 MG/1
650 TABLET ORAL EVERY 4 HOURS PRN
Status: DISCONTINUED | OUTPATIENT
Start: 2018-05-17 | End: 2018-05-18 | Stop reason: HOSPADM

## 2018-05-17 RX ADMIN — METHYLPREDNISOLONE SODIUM SUCCINATE 125 MG: 125 INJECTION, POWDER, FOR SOLUTION INTRAMUSCULAR; INTRAVENOUS at 16:05

## 2018-05-17 RX ADMIN — SODIUM CHLORIDE 250 ML: 9 INJECTION, SOLUTION INTRAVENOUS at 10:42

## 2018-05-17 RX ADMIN — FINASTERIDE 5 MG: 5 TABLET, FILM COATED ORAL at 10:43

## 2018-05-17 RX ADMIN — NITROGLYCERIN 5 MCG/MIN: 20 INJECTION INTRAVENOUS at 04:54

## 2018-05-17 RX ADMIN — METOPROLOL TARTRATE 12.5 MG: 25 TABLET ORAL at 20:17

## 2018-05-17 RX ADMIN — SODIUM CHLORIDE: 9 INJECTION, SOLUTION INTRAVENOUS at 20:28

## 2018-05-17 RX ADMIN — AMITRIPTYLINE HYDROCHLORIDE 10 MG: 10 TABLET, FILM COATED ORAL at 20:17

## 2018-05-17 RX ADMIN — DESMOPRESSIN ACETATE 40 MG: 0.2 TABLET ORAL at 20:17

## 2018-05-17 RX ADMIN — DIPHENHYDRAMINE HYDROCHLORIDE 50 MG: 50 INJECTION, SOLUTION INTRAMUSCULAR; INTRAVENOUS at 16:06

## 2018-05-17 RX ADMIN — ASPIRIN 81 MG: 81 TABLET, CHEWABLE ORAL at 10:43

## 2018-05-17 RX ADMIN — HEPARIN SODIUM 2000 UNITS: 1000 INJECTION, SOLUTION INTRAVENOUS; SUBCUTANEOUS at 12:50

## 2018-05-17 RX ADMIN — METOPROLOL TARTRATE 12.5 MG: 25 TABLET ORAL at 10:42

## 2018-05-17 RX ADMIN — PANTOPRAZOLE SODIUM 40 MG: 40 TABLET, DELAYED RELEASE ORAL at 10:43

## 2018-05-17 ASSESSMENT — PAIN DESCRIPTION - PROGRESSION
CLINICAL_PROGRESSION: GRADUALLY IMPROVING
CLINICAL_PROGRESSION: GRADUALLY IMPROVING

## 2018-05-17 ASSESSMENT — PAIN DESCRIPTION - FREQUENCY: FREQUENCY: INTERMITTENT

## 2018-05-17 ASSESSMENT — PAIN DESCRIPTION - PAIN TYPE: TYPE: ACUTE PAIN

## 2018-05-17 ASSESSMENT — PAIN SCALES - GENERAL
PAINLEVEL_OUTOF10: 5
PAINLEVEL_OUTOF10: 5

## 2018-05-17 ASSESSMENT — PAIN DESCRIPTION - DESCRIPTORS: DESCRIPTORS: ACHING

## 2018-05-17 ASSESSMENT — PAIN DESCRIPTION - LOCATION: LOCATION: CHEST

## 2018-05-18 VITALS
WEIGHT: 187.31 LBS | TEMPERATURE: 98 F | SYSTOLIC BLOOD PRESSURE: 119 MMHG | RESPIRATION RATE: 16 BRPM | OXYGEN SATURATION: 96 % | HEART RATE: 65 BPM | DIASTOLIC BLOOD PRESSURE: 73 MMHG | HEIGHT: 67 IN | BODY MASS INDEX: 29.4 KG/M2

## 2018-05-18 LAB
ABSOLUTE EOS #: <0.03 K/UL (ref 0–0.44)
ABSOLUTE IMMATURE GRANULOCYTE: 0.04 K/UL (ref 0–0.3)
ABSOLUTE LYMPH #: 0.76 K/UL (ref 1.1–3.7)
ABSOLUTE MONO #: 0.37 K/UL (ref 0.1–1.2)
ANION GAP SERPL CALCULATED.3IONS-SCNC: 15 MMOL/L (ref 9–17)
BASOPHILS # BLD: 0 % (ref 0–2)
BASOPHILS ABSOLUTE: <0.03 K/UL (ref 0–0.2)
BUN BLDV-MCNC: 13 MG/DL (ref 8–23)
BUN/CREAT BLD: ABNORMAL (ref 9–20)
CALCIUM SERPL-MCNC: 8.4 MG/DL (ref 8.6–10.4)
CHLORIDE BLD-SCNC: 100 MMOL/L (ref 98–107)
CO2: 19 MMOL/L (ref 20–31)
CREAT SERPL-MCNC: 1.25 MG/DL (ref 0.7–1.2)
DIFFERENTIAL TYPE: ABNORMAL
EKG ATRIAL RATE: 64 BPM
EKG P AXIS: 48 DEGREES
EKG P-R INTERVAL: 164 MS
EKG Q-T INTERVAL: 434 MS
EKG QRS DURATION: 80 MS
EKG QTC CALCULATION (BAZETT): 447 MS
EKG R AXIS: 81 DEGREES
EKG T AXIS: -93 DEGREES
EKG VENTRICULAR RATE: 64 BPM
EOSINOPHILS RELATIVE PERCENT: 0 % (ref 1–4)
GFR AFRICAN AMERICAN: >60 ML/MIN
GFR NON-AFRICAN AMERICAN: 56 ML/MIN
GFR SERPL CREATININE-BSD FRML MDRD: ABNORMAL ML/MIN/{1.73_M2}
GFR SERPL CREATININE-BSD FRML MDRD: ABNORMAL ML/MIN/{1.73_M2}
GLUCOSE BLD-MCNC: 215 MG/DL (ref 70–99)
HCT VFR BLD CALC: 40.7 % (ref 40.7–50.3)
HEMOGLOBIN: 13.8 G/DL (ref 13–17)
IMMATURE GRANULOCYTES: 0 %
LYMPHOCYTES # BLD: 8 % (ref 24–43)
MCH RBC QN AUTO: 31.7 PG (ref 25.2–33.5)
MCHC RBC AUTO-ENTMCNC: 33.9 G/DL (ref 28.4–34.8)
MCV RBC AUTO: 93.3 FL (ref 82.6–102.9)
MONOCYTES # BLD: 4 % (ref 3–12)
NRBC AUTOMATED: 0 PER 100 WBC
PDW BLD-RTO: 13.3 % (ref 11.8–14.4)
PLATELET # BLD: 189 K/UL (ref 138–453)
PLATELET ESTIMATE: ABNORMAL
PMV BLD AUTO: 9.6 FL (ref 8.1–13.5)
POTASSIUM SERPL-SCNC: 4.4 MMOL/L (ref 3.7–5.3)
RBC # BLD: 4.36 M/UL (ref 4.21–5.77)
RBC # BLD: ABNORMAL 10*6/UL
SEG NEUTROPHILS: 88 % (ref 36–65)
SEGMENTED NEUTROPHILS ABSOLUTE COUNT: 8.53 K/UL (ref 1.5–8.1)
SODIUM BLD-SCNC: 134 MMOL/L (ref 135–144)
TROPONIN INTERP: ABNORMAL
TROPONIN T: 1.19 NG/ML
WBC # BLD: 9.7 K/UL (ref 3.5–11.3)
WBC # BLD: ABNORMAL 10*3/UL

## 2018-05-18 PROCEDURE — 97116 GAIT TRAINING THERAPY: CPT

## 2018-05-18 PROCEDURE — 99232 SBSQ HOSP IP/OBS MODERATE 35: CPT | Performed by: INTERNAL MEDICINE

## 2018-05-18 PROCEDURE — 36415 COLL VENOUS BLD VENIPUNCTURE: CPT

## 2018-05-18 PROCEDURE — 6370000000 HC RX 637 (ALT 250 FOR IP): Performed by: HOSPITALIST

## 2018-05-18 PROCEDURE — 85025 COMPLETE CBC W/AUTO DIFF WBC: CPT

## 2018-05-18 PROCEDURE — 84484 ASSAY OF TROPONIN QUANT: CPT

## 2018-05-18 PROCEDURE — 6370000000 HC RX 637 (ALT 250 FOR IP): Performed by: INTERNAL MEDICINE

## 2018-05-18 PROCEDURE — 80048 BASIC METABOLIC PNL TOTAL CA: CPT

## 2018-05-18 RX ADMIN — FINASTERIDE 5 MG: 5 TABLET, FILM COATED ORAL at 08:06

## 2018-05-18 RX ADMIN — TICAGRELOR 90 MG: 90 TABLET ORAL at 08:06

## 2018-05-18 RX ADMIN — PANTOPRAZOLE SODIUM 40 MG: 40 TABLET, DELAYED RELEASE ORAL at 08:06

## 2018-05-18 RX ADMIN — ASPIRIN 81 MG: 81 TABLET, CHEWABLE ORAL at 08:06

## 2018-05-18 RX ADMIN — METOPROLOL TARTRATE 12.5 MG: 25 TABLET ORAL at 08:06

## 2018-05-18 ASSESSMENT — PAIN SCALES - GENERAL: PAINLEVEL_OUTOF10: 2

## 2018-05-18 ASSESSMENT — PAIN DESCRIPTION - DESCRIPTORS: DESCRIPTORS: ACHING

## 2018-05-18 ASSESSMENT — PAIN DESCRIPTION - LOCATION: LOCATION: GENERALIZED

## 2018-06-13 ENCOUNTER — CARE COORDINATION (OUTPATIENT)
Dept: CARE COORDINATION | Age: 78
End: 2018-06-13

## 2018-06-27 ENCOUNTER — HOSPITAL ENCOUNTER (OUTPATIENT)
Age: 78
Discharge: HOME | End: 2018-06-27
Payer: MEDICARE

## 2018-06-27 LAB
ALBUMIN SERPL-MCNC: 4.1 G/DL (ref 3.5–5.2)
ALBUMIN/GLOBULIN RATIO: 1.3 (ref 1–2.5)
ALP BLD-CCNC: 103 U/L (ref 40–129)
ALT SERPL-CCNC: 28 U/L (ref 5–41)
ANION GAP SERPL CALCULATED.3IONS-SCNC: 12 MMOL/L (ref 9–17)
AST SERPL-CCNC: 22 U/L
BILIRUB SERPL-MCNC: 0.34 MG/DL (ref 0.3–1.2)
BUN BLDV-MCNC: 15 MG/DL (ref 8–23)
BUN/CREAT BLD: ABNORMAL (ref 9–20)
CALCIUM SERPL-MCNC: 8.9 MG/DL (ref 8.6–10.4)
CHLORIDE BLD-SCNC: 108 MMOL/L (ref 98–107)
CHOLESTEROL, FASTING: 93 MG/DL
CHOLESTEROL/HDL RATIO: 2.3
CO2: 22 MMOL/L (ref 20–31)
CREAT SERPL-MCNC: 1.18 MG/DL (ref 0.7–1.2)
GFR AFRICAN AMERICAN: >60 ML/MIN
GFR NON-AFRICAN AMERICAN: 60 ML/MIN
GFR SERPL CREATININE-BSD FRML MDRD: ABNORMAL ML/MIN/{1.73_M2}
GFR SERPL CREATININE-BSD FRML MDRD: ABNORMAL ML/MIN/{1.73_M2}
GLUCOSE BLD-MCNC: 116 MG/DL (ref 70–99)
HDLC SERPL-MCNC: 40 MG/DL
LDL CHOLESTEROL: 37 MG/DL (ref 0–130)
POTASSIUM SERPL-SCNC: 4.6 MMOL/L (ref 3.7–5.3)
SODIUM BLD-SCNC: 142 MMOL/L (ref 135–144)
TOTAL PROTEIN: 7.2 G/DL (ref 6.4–8.3)
TRIGLYCERIDE, FASTING: 80 MG/DL
VLDLC SERPL CALC-MCNC: ABNORMAL MG/DL (ref 1–30)

## 2018-06-27 PROCEDURE — 36415 COLL VENOUS BLD VENIPUNCTURE: CPT

## 2018-06-27 PROCEDURE — 80053 COMPREHEN METABOLIC PANEL: CPT

## 2018-06-27 PROCEDURE — 80061 LIPID PANEL: CPT

## 2018-08-03 ENCOUNTER — CARE COORDINATION (OUTPATIENT)
Dept: CARE COORDINATION | Age: 78
End: 2018-08-03

## 2018-08-03 NOTE — CARE COORDINATION
Ambulatory Care Coordination Note  8/3/2018  CM Risk Score: 5  [place] Mortality Risk Score: 10.44    ACC: Nelyncnuris Veras    Summary Note: CC called Mr. Galen Cheek for an update on how he is doing/needs and to schedule him an appointment to see his PCP. He reports he can not be seen at [facility] with his new insurance. He states he did not ask for his insurance to be changed but it was changed. He states other wise he is \"OK\". He is getting ready for an appointment with the neurologist.   Plan:  Follow up on his insurance status with [facility] and report back to Mr. Galen Cheek later today. Care Coordination Interventions    Program Enrollment:  Rising Risk  Referral from Primary Care Provider:  No  Suggested Interventions and Community Resources  Medi Set or Pill Pack: In Process         Goals Addressed     None          Prior to Admission medications    Medication Sig Start Date End Date Taking? Authorizing Provider   lansoprazole (PREVACID) 30 MG delayed release capsule take 1 capsule by mouth once daily 6/5/18   JW Gonzales CNP   amLODIPine (NORVASC) 2.5 MG tablet take 1 tablet by mouth once daily 6/5/18   JW Gonzales CNP   isosorbide mononitrate (IMDUR) 30 MG extended release tablet take 1 tablet by mouth once daily 6/5/18   JW Gonzales CNP   metoprolol tartrate (LOPRESSOR) 25 MG tablet Take 0.5 tablets by mouth 2 times daily 5/18/18   JW Salcedo CNP   ticagrelor (BRILINTA) 90 MG TABS tablet Take 1 tablet by mouth 2 times daily 5/18/18   WJ Salcedo CNP   aspirin 81 MG chewable tablet Take 81 mg by mouth daily    Historical Provider, MD   benzoyl peroxide 5 % gel Apply topically daily. 1/8/18   JW Gonzales CNP   mineral oil-hydrophilic petrolatum (AQUAPHOR) ointment Apply topically as needed.  12/19/17   Magnus Bradley PA-C   butalbital-acetaminophen-caffeine (FIORICET, ESGIC) -40 MG per tablet Take 1 tablet by mouth every 6 hours as needed for Headaches    Historical Provider, MD   Handicap Placard [address] [facility] It is my medical opinion that Evaristo Huston requires a disability parking placard for the following reasons:  He cannot walk without assistance from another person or the use of an assistance device (cane, crutch, prosthetic device, wheelchair, etc.). Duration of need: permanent 9/5/17   Héctor MediateJW - CNP   finasteride (PROSCAR) 5 MG tablet Take 1 tablet by mouth daily 8/14/17   Héctor Mediate, APRN - CNP   atorvastatin (LIPITOR) 40 MG tablet Take 1 tablet by mouth daily 8/14/17   HéctorJW Webb - CNP   ammonium lactate (LAC-HYDRIN) 12 % lotion Apply topically daily. 11/15/16   JW Lester - CNP   NITROSTAT 0.4 MG SL tablet Place 0.4 mg under the tongue every 5 minutes as needed  3/13/15   Historical Provider, MD   doxepin (SINEQUAN) 100 MG capsule Take 100 mg by mouth nightly. Historical Provider, MD   amitriptyline (ELAVIL) 10 MG tablet Take 10 mg by mouth nightly.       Historical Provider, MD       Future Appointments  Date Time Provider Shireen Charles   11/19/2018 1:00 PM MD JANETH Newton  [facility]

## 2018-08-27 ENCOUNTER — CARE COORDINATION (OUTPATIENT)
Dept: CARE COORDINATION | Age: 78
End: 2018-08-27

## 2018-08-27 NOTE — CARE COORDINATION
Attempted to reach [place] to confirm that he is no longer seeing a [id number] [facility] provider due to his insurance change. Unable to leave a . His current PCP listed on his chart is with Melissa. Based on the last CC phone call, Aishwarya will be removed from Care Coordination due to his change in insurance and now seeing a [address] provider.

## 2019-01-03 ENCOUNTER — OFFICE VISIT (OUTPATIENT)
Dept: GASTROENTEROLOGY | Age: 79
End: 2019-01-03
Payer: MEDICARE

## 2019-01-03 VITALS
HEART RATE: 84 BPM | WEIGHT: 186.6 LBS | HEIGHT: 66 IN | SYSTOLIC BLOOD PRESSURE: 121 MMHG | BODY MASS INDEX: 29.99 KG/M2 | DIASTOLIC BLOOD PRESSURE: 84 MMHG

## 2019-01-03 DIAGNOSIS — D12.6 TUBULAR ADENOMA OF COLON: Primary | ICD-10-CM

## 2019-01-03 DIAGNOSIS — K57.30 DIVERTICULOSIS OF COLON: ICD-10-CM

## 2019-01-03 DIAGNOSIS — R11.0 NAUSEA: ICD-10-CM

## 2019-01-03 PROCEDURE — 99213 OFFICE O/P EST LOW 20 MIN: CPT | Performed by: INTERNAL MEDICINE

## 2019-01-03 ASSESSMENT — ENCOUNTER SYMPTOMS
CONSTIPATION: 0
BACK PAIN: 1
SHORTNESS OF BREATH: 1
ANAL BLEEDING: 0
SINUS PRESSURE: 0
NAUSEA: 1
WHEEZING: 1
VOMITING: 0
RECTAL PAIN: 0
SINUS PAIN: 0
TROUBLE SWALLOWING: 0
CHEST TIGHTNESS: 0
DIARRHEA: 0
EYE REDNESS: 0
COLOR CHANGE: 0
ABDOMINAL PAIN: 0
BLOOD IN STOOL: 0
EYE PAIN: 0
ABDOMINAL DISTENTION: 0

## 2019-02-04 ENCOUNTER — OFFICE VISIT (OUTPATIENT)
Dept: FAMILY MEDICINE CLINIC | Age: 79
End: 2019-02-04
Payer: MEDICARE

## 2019-02-04 VITALS
HEIGHT: 66 IN | WEIGHT: 182 LBS | DIASTOLIC BLOOD PRESSURE: 75 MMHG | TEMPERATURE: 97.9 F | OXYGEN SATURATION: 96 % | HEART RATE: 59 BPM | SYSTOLIC BLOOD PRESSURE: 117 MMHG | BODY MASS INDEX: 29.25 KG/M2

## 2019-02-04 DIAGNOSIS — D16.5 BENIGN NEOPLASM OF MANDIBLE: ICD-10-CM

## 2019-02-04 DIAGNOSIS — K05.219 GUM ABSCESS: Primary | ICD-10-CM

## 2019-02-04 DIAGNOSIS — K05.10 GINGIVITIS: ICD-10-CM

## 2019-02-04 PROCEDURE — 99213 OFFICE O/P EST LOW 20 MIN: CPT | Performed by: FAMILY MEDICINE

## 2019-02-04 RX ORDER — CLINDAMYCIN HYDROCHLORIDE 150 MG/1
150 CAPSULE ORAL 3 TIMES DAILY
Qty: 21 CAPSULE | Refills: 0 | Status: SHIPPED | OUTPATIENT
Start: 2019-02-04 | End: 2019-02-11

## 2019-02-04 ASSESSMENT — PATIENT HEALTH QUESTIONNAIRE - PHQ9
SUM OF ALL RESPONSES TO PHQ QUESTIONS 1-9: 0
SUM OF ALL RESPONSES TO PHQ QUESTIONS 1-9: 0
1. LITTLE INTEREST OR PLEASURE IN DOING THINGS: 0
DEPRESSION UNABLE TO ASSESS: PT REFUSES
SUM OF ALL RESPONSES TO PHQ9 QUESTIONS 1 & 2: 0
2. FEELING DOWN, DEPRESSED OR HOPELESS: 0

## 2019-02-04 ASSESSMENT — ENCOUNTER SYMPTOMS
ALLERGIC/IMMUNOLOGIC NEGATIVE: 1
GASTROINTESTINAL NEGATIVE: 1
EYES NEGATIVE: 1
RESPIRATORY NEGATIVE: 1

## 2019-02-05 ENCOUNTER — HOSPITAL ENCOUNTER (OUTPATIENT)
Age: 79
Discharge: HOME | End: 2019-02-07
Payer: MEDICARE

## 2019-02-15 ENCOUNTER — HOSPITAL ENCOUNTER (OUTPATIENT)
Dept: GENERAL RADIOLOGY | Age: 79
Discharge: HOME | End: 2019-02-17
Payer: MEDICARE

## 2019-02-15 ENCOUNTER — HOSPITAL ENCOUNTER (OUTPATIENT)
Age: 79
Discharge: HOME | End: 2019-02-17
Payer: MEDICARE

## 2019-02-15 DIAGNOSIS — R52 PAIN: ICD-10-CM

## 2019-02-15 DIAGNOSIS — L02.91 ABSCESS: Primary | ICD-10-CM

## 2019-02-15 PROCEDURE — 70110 X-RAY EXAM OF JAW 4/> VIEWS: CPT

## 2019-03-13 ENCOUNTER — HOSPITAL ENCOUNTER (OUTPATIENT)
Dept: MRI IMAGING | Age: 79
Discharge: HOME | End: 2019-03-15
Payer: MEDICARE

## 2019-03-13 DIAGNOSIS — D16.5 BENIGN NEOPLASM OF MANDIBLE: ICD-10-CM

## 2019-03-13 DIAGNOSIS — K05.10 GINGIVITIS: ICD-10-CM

## 2019-03-13 DIAGNOSIS — K05.219 GUM ABSCESS: ICD-10-CM

## 2019-03-13 LAB
GFR NON-AFRICAN AMERICAN: 46 ML/MIN
GFR SERPL CREATININE-BSD FRML MDRD: 55 ML/MIN
GFR SERPL CREATININE-BSD FRML MDRD: ABNORMAL ML/MIN/{1.73_M2}
POC CREATININE: 1.48 MG/DL (ref 0.51–1.19)

## 2019-03-13 PROCEDURE — 70543 MRI ORBT/FAC/NCK W/O &W/DYE: CPT

## 2019-03-13 PROCEDURE — 6360000004 HC RX CONTRAST MEDICATION: Performed by: FAMILY MEDICINE

## 2019-03-13 PROCEDURE — 82565 ASSAY OF CREATININE: CPT

## 2019-03-13 PROCEDURE — A9576 INJ PROHANCE MULTIPACK: HCPCS | Performed by: FAMILY MEDICINE

## 2019-03-13 RX ORDER — SODIUM CHLORIDE 0.9 % (FLUSH) 0.9 %
10 SYRINGE (ML) INJECTION 2 TIMES DAILY
Status: DISCONTINUED | OUTPATIENT
Start: 2019-03-13 | End: 2019-03-16 | Stop reason: HOSPADM

## 2019-03-13 RX ADMIN — GADOTERIDOL 16 ML: 279.3 INJECTION, SOLUTION INTRAVENOUS at 18:14

## 2019-03-20 ENCOUNTER — HOSPITAL ENCOUNTER (OUTPATIENT)
Age: 79
Discharge: HOME | End: 2019-03-20
Payer: MEDICARE

## 2019-03-20 DIAGNOSIS — E78.00 PURE HYPERCHOLESTEROLEMIA: ICD-10-CM

## 2019-03-20 DIAGNOSIS — Z12.5 PROSTATE CANCER SCREENING: ICD-10-CM

## 2019-03-20 LAB
ABSOLUTE EOS #: 0.56 K/UL (ref 0–0.44)
ABSOLUTE IMMATURE GRANULOCYTE: <0.03 K/UL (ref 0–0.3)
ABSOLUTE LYMPH #: 1.48 K/UL (ref 1.1–3.7)
ABSOLUTE MONO #: 0.54 K/UL (ref 0.1–1.2)
ALBUMIN SERPL-MCNC: 4.4 G/DL (ref 3.5–5.2)
ALBUMIN/GLOBULIN RATIO: 1.3 (ref 1–2.5)
ALP BLD-CCNC: 99 U/L (ref 40–129)
ALT SERPL-CCNC: 21 U/L (ref 5–41)
ANION GAP SERPL CALCULATED.3IONS-SCNC: 13 MMOL/L (ref 9–17)
AST SERPL-CCNC: 25 U/L
BASOPHILS # BLD: 1 % (ref 0–2)
BASOPHILS ABSOLUTE: 0.03 K/UL (ref 0–0.2)
BILIRUB SERPL-MCNC: 0.41 MG/DL (ref 0.3–1.2)
BUN BLDV-MCNC: 9 MG/DL (ref 8–23)
BUN/CREAT BLD: ABNORMAL (ref 9–20)
CALCIUM SERPL-MCNC: 9.4 MG/DL (ref 8.6–10.4)
CHLORIDE BLD-SCNC: 103 MMOL/L (ref 98–107)
CHOLESTEROL, FASTING: 138 MG/DL
CHOLESTEROL/HDL RATIO: 2.8
CO2: 25 MMOL/L (ref 20–31)
CREAT SERPL-MCNC: 1.36 MG/DL (ref 0.7–1.2)
DIFFERENTIAL TYPE: ABNORMAL
EOSINOPHILS RELATIVE PERCENT: 9 % (ref 1–4)
GFR AFRICAN AMERICAN: >60 ML/MIN
GFR NON-AFRICAN AMERICAN: 51 ML/MIN
GFR SERPL CREATININE-BSD FRML MDRD: ABNORMAL ML/MIN/{1.73_M2}
GFR SERPL CREATININE-BSD FRML MDRD: ABNORMAL ML/MIN/{1.73_M2}
GLUCOSE BLD-MCNC: 117 MG/DL (ref 70–99)
HCT VFR BLD CALC: 44.9 % (ref 40.7–50.3)
HDLC SERPL-MCNC: 50 MG/DL
HEMOGLOBIN: 15.3 G/DL (ref 13–17)
IMMATURE GRANULOCYTES: 0 %
LDL CHOLESTEROL: 72 MG/DL (ref 0–130)
LYMPHOCYTES # BLD: 25 % (ref 24–43)
MCH RBC QN AUTO: 31.1 PG (ref 25.2–33.5)
MCHC RBC AUTO-ENTMCNC: 34.1 G/DL (ref 28.4–34.8)
MCV RBC AUTO: 91.3 FL (ref 82.6–102.9)
MONOCYTES # BLD: 9 % (ref 3–12)
NRBC AUTOMATED: 0 PER 100 WBC
PDW BLD-RTO: 13.3 % (ref 11.8–14.4)
PLATELET # BLD: 235 K/UL (ref 138–453)
PLATELET ESTIMATE: ABNORMAL
PMV BLD AUTO: 9 FL (ref 8.1–13.5)
POTASSIUM SERPL-SCNC: 4.2 MMOL/L (ref 3.7–5.3)
PROSTATE SPECIFIC ANTIGEN: 1.46 UG/L
RBC # BLD: 4.92 M/UL (ref 4.21–5.77)
RBC # BLD: ABNORMAL 10*6/UL
SEG NEUTROPHILS: 56 % (ref 36–65)
SEGMENTED NEUTROPHILS ABSOLUTE COUNT: 3.36 K/UL (ref 1.5–8.1)
SODIUM BLD-SCNC: 141 MMOL/L (ref 135–144)
THYROXINE, FREE: 1.11 NG/DL (ref 0.93–1.7)
TOTAL PROTEIN: 7.7 G/DL (ref 6.4–8.3)
TRIGLYCERIDE, FASTING: 80 MG/DL
TSH SERPL DL<=0.05 MIU/L-ACNC: 4.58 MIU/L (ref 0.3–5)
VLDLC SERPL CALC-MCNC: NORMAL MG/DL (ref 1–30)
WBC # BLD: 6 K/UL (ref 3.5–11.3)
WBC # BLD: ABNORMAL 10*3/UL

## 2019-03-20 PROCEDURE — G0103 PSA SCREENING: HCPCS

## 2019-03-20 PROCEDURE — 80061 LIPID PANEL: CPT

## 2019-03-20 PROCEDURE — 84439 ASSAY OF FREE THYROXINE: CPT

## 2019-03-20 PROCEDURE — 84443 ASSAY THYROID STIM HORMONE: CPT

## 2019-03-20 PROCEDURE — 85025 COMPLETE CBC W/AUTO DIFF WBC: CPT

## 2019-03-20 PROCEDURE — 36415 COLL VENOUS BLD VENIPUNCTURE: CPT

## 2019-03-20 PROCEDURE — 80053 COMPREHEN METABOLIC PANEL: CPT

## 2019-03-25 ENCOUNTER — CARE COORDINATION (OUTPATIENT)
Dept: CARE COORDINATION | Age: 79
End: 2019-03-25

## 2019-03-26 ENCOUNTER — TELEPHONE (OUTPATIENT)
Dept: PRIMARY CARE CLINIC | Age: 79
End: 2019-03-26

## 2019-03-26 ENCOUNTER — TELEPHONE (OUTPATIENT)
Dept: FAMILY MEDICINE CLINIC | Age: 79
End: 2019-03-26

## 2019-04-02 ENCOUNTER — TELEPHONE (OUTPATIENT)
Dept: GASTROENTEROLOGY | Age: 79
End: 2019-04-02

## 2019-04-03 ENCOUNTER — OFFICE VISIT (OUTPATIENT)
Dept: GASTROENTEROLOGY | Age: 79
End: 2019-04-03
Payer: MEDICARE

## 2019-04-03 VITALS
WEIGHT: 182 LBS | HEIGHT: 67 IN | BODY MASS INDEX: 28.56 KG/M2 | SYSTOLIC BLOOD PRESSURE: 113 MMHG | DIASTOLIC BLOOD PRESSURE: 63 MMHG | HEART RATE: 59 BPM

## 2019-04-03 DIAGNOSIS — D12.6 TUBULAR ADENOMA OF COLON: Primary | ICD-10-CM

## 2019-04-03 DIAGNOSIS — R19.8 TENESMUS: ICD-10-CM

## 2019-04-03 PROCEDURE — 99213 OFFICE O/P EST LOW 20 MIN: CPT | Performed by: INTERNAL MEDICINE

## 2019-04-03 ASSESSMENT — ENCOUNTER SYMPTOMS
BACK PAIN: 1
ABDOMINAL DISTENTION: 0
SINUS PRESSURE: 0
BLOOD IN STOOL: 0
TROUBLE SWALLOWING: 0
RECTAL PAIN: 0
DIARRHEA: 0
SINUS PAIN: 0
ANAL BLEEDING: 0
CONSTIPATION: 0
ABDOMINAL PAIN: 0
VOMITING: 0
NAUSEA: 0

## 2019-04-03 NOTE — PROGRESS NOTES
Subjective:      Patient ID: Kaycee Fuentes is a 77 y.o. male. HPI    Dr. Matias Hermosillo, APRN - CNP our mutual patient Kaycee Fuentes was seen  for   1. Tubular adenoma of colon    2. Tenesmus     . Patient known to have colon polyps. However during his a previous colonoscopy, in 2017 he had a poor preparation. There is concern whether there are missing lesions because of poor preparation. At present patient is on antiplatelet therapy. He sustained stroke in 2018. At present he is doing reasonably well. No GI symptoms including dysphagia. No dyspepsia. Tolerating diet well. No nausea vomiting. No weight loss. Bowel movements satisfactory. Past Medical, Family, and Social History reviewed and does contribute to the patient presenting condition. patient\"s PMH/PSH,SH,PSYCH hx, MEDs, ALLERGIES, and ROS was all reviewed and updated ion the appropriate sections        Review of Systems   Constitutional: Negative for appetite change, fatigue and unexpected weight change. HENT: Negative for sinus pressure, sinus pain and trouble swallowing. Gastrointestinal: Negative for abdominal distention, abdominal pain, anal bleeding, blood in stool, constipation, diarrhea, nausea, rectal pain and vomiting. Endocrine: Negative for cold intolerance and heat intolerance. Genitourinary: Negative for difficulty urinating, frequency and urgency. Musculoskeletal: Positive for back pain and neck pain. Negative for arthralgias. Skin: Negative. Allergic/Immunologic: Negative for environmental allergies and food allergies. Neurological: Positive for weakness and headaches. Negative for dizziness. Hematological: Does not bruise/bleed easily. Psychiatric/Behavioral: Positive for agitation. Negative for sleep disturbance. The patient is not nervous/anxious. Objective:   Physical Exam    Assessment:       Diagnosis Orders   1. Tubular adenoma of colon     2.  Tenesmus             Plan: Patient will be seen in the next 6 months. At that time if he is status is stable, and also if it is possible to hold antiplatelet therapy for a few days, he will be scheduled for colonoscopy. This was explained to the patient. He is advised to continue high-fiber diet.

## 2019-04-16 ENCOUNTER — HOSPITAL ENCOUNTER (OUTPATIENT)
Age: 79
Discharge: HOME | End: 2019-04-16
Payer: MEDICARE

## 2019-04-16 ENCOUNTER — OFFICE VISIT (OUTPATIENT)
Dept: UROLOGY | Age: 79
End: 2019-04-16
Payer: MEDICARE

## 2019-04-16 VITALS
BODY MASS INDEX: 28.82 KG/M2 | HEART RATE: 80 BPM | SYSTOLIC BLOOD PRESSURE: 127 MMHG | DIASTOLIC BLOOD PRESSURE: 84 MMHG | HEIGHT: 67 IN | TEMPERATURE: 97.8 F | WEIGHT: 183.6 LBS

## 2019-04-16 DIAGNOSIS — N40.1 HYPERTROPHY OF PROSTATE WITH URINARY OBSTRUCTION: Primary | ICD-10-CM

## 2019-04-16 DIAGNOSIS — R35.1 NOCTURIA: ICD-10-CM

## 2019-04-16 DIAGNOSIS — N13.8 HYPERTROPHY OF PROSTATE WITH URINARY OBSTRUCTION: Primary | ICD-10-CM

## 2019-04-16 LAB
ALT SERPL-CCNC: 24 U/L (ref 5–41)
AST SERPL-CCNC: 22 U/L
CHOLESTEROL/HDL RATIO: 2.1
CHOLESTEROL: 102 MG/DL
HDLC SERPL-MCNC: 49 MG/DL
LDL CHOLESTEROL: 39 MG/DL (ref 0–130)
TOTAL CK: 172 U/L (ref 39–308)
TRIGL SERPL-MCNC: 71 MG/DL
VLDLC SERPL CALC-MCNC: NORMAL MG/DL (ref 1–30)

## 2019-04-16 PROCEDURE — 99204 OFFICE O/P NEW MOD 45 MIN: CPT | Performed by: UROLOGY

## 2019-04-16 PROCEDURE — 36415 COLL VENOUS BLD VENIPUNCTURE: CPT

## 2019-04-16 PROCEDURE — 84460 ALANINE AMINO (ALT) (SGPT): CPT

## 2019-04-16 PROCEDURE — 82550 ASSAY OF CK (CPK): CPT

## 2019-04-16 PROCEDURE — 80061 LIPID PANEL: CPT

## 2019-04-16 PROCEDURE — 84450 TRANSFERASE (AST) (SGOT): CPT

## 2019-04-16 ASSESSMENT — ENCOUNTER SYMPTOMS
SHORTNESS OF BREATH: 0
ABDOMINAL PAIN: 0
BACK PAIN: 0
COUGH: 0
EYE REDNESS: 0
EYE PAIN: 0
COLOR CHANGE: 0
VOMITING: 0
NAUSEA: 0
WHEEZING: 0

## 2019-04-16 NOTE — PROGRESS NOTES
Review of Systems   Constitutional: Negative for activity change, chills and fever. Eyes: Negative for pain, redness and visual disturbance. Respiratory: Negative for cough, shortness of breath and wheezing. Cardiovascular: Negative for chest pain and leg swelling. Gastrointestinal: Negative for abdominal pain, nausea and vomiting. Genitourinary: Positive for penile pain (intermittent) and testicular pain (intermittent). Negative for difficulty urinating, discharge, dysuria, flank pain, frequency, hematuria and scrotal swelling. Musculoskeletal: Positive for gait problem (walks with cane). Negative for back pain, joint swelling and myalgias. Skin: Negative for color change and rash. Neurological: Positive for tremors and speech difficulty. Negative for dizziness and numbness. Hematological: Negative for adenopathy. Does not bruise/bleed easily.

## 2019-04-16 NOTE — PROGRESS NOTES
[facility]  [facility] [place]  [address]  [address]  [address] [facility] [address]  Dept: [address] [facility] Urology Office Note - New Patient    Patient:  Trea Michaud  YOB: 1940  Date: 4/16/2019    The patient is a 77 y.o. male who presentstoday for evaluation of the following problems:   Chief Complaint   Patient presents with    Benign Prostatic Hypertrophy     NP - ref by PCP; pt states he was taking finasteride for BPH, but states he \"went off the medication because it shrunk everything\"     referred by JW Shields CNP. HPI  Here as a New Pt. He was started on Finasteride and it \"shrunk his penis and having trouble urinating because it is too short\". He was having pain in his testicles, and that is why it was originally started. He has now stopped it on his own, and takes cranberry juice for pain relief. He feels he empties well, has no frequency, no urgency and has nocturia rarely. (Patient's old records have been requested, reviewed and summarized in today's note.)    Summary of old records: N/A    Additional History: N/A    ProceduresToday: N/A    Urinalysis today:  No results found for this visit on 04/16/19.     AUA Symptom Score (4/16/2019):  INCOMPLETE EMPTYING: How often have you had the sensation of not emptying your bladder?: Not at all  FREQUENCY: How often do you have to urinate less than every two hours?: Less than 1 to 5 times  INTERMITTENCY: How often have you found you stopped and started again several times when you urinated?: Not at all  URGENCY: How often have you found it difficult to postpone urination?: Not at all  WEAK STREAM: How often have you had a weak urinary stream?: Not at all  @(3551)@  NOCTURIA: How many times did you typically get up at night to uriniate?: NONE  TOTAL I-PSS SCORE[de-identified] 1  How would you feel if you were to spend the rest of your life with your urinary condition?: Mostly Satisfied    Last BUN andcreatinine:  Lab Results   Component Value Date    BUN 9 03/20/2019     Lab Results   Component Value Date    CREATININE 1.36 (H) 03/20/2019       Additional Lab/Culture results: none    Imaging Reviewed during this Office Visit: none  (results were independently reviewed byphysician and radiology report verified)    PAST MEDICAL, FAMILY AND SOCIAL HISTORY:     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 ([facility] [address] 3/18/2011    d/t being hurt on the job    CKD (chronic kidney disease) stage 2, GFR 60-89 ml/min     CKD (chronic kidney disease) stage 3, GFR 30-59 ml/min ([facility])     Diverticulosis of colon     GERD (gastroesophageal reflux disease)     Impaired renal function     MRSA (methicillin resistant staph aureus) culture positive 9/23/2015    leg    Osteoarthritis of knee     Left    Proteinuria     [facility] 3/18/2011    d/t 12-foot drop on the job    Temporary loss of eyesight     periodically, happened x7    Tubular adenoma of colon 2012, 2017    mulitple     Past Surgical History:   Procedure Laterality Date    COLONOSCOPY  11/02/2012    poor prep, tubular adenoma    COLONOSCOPY  09/19/2017    diverticulosis, multiple polyps as described, spot marking done, pathology-tubular adenoma x 4    MT COLSC FLX W/RMVL OF TUMOR POLYP LESION SNARE TQ N/A 9/19/2017    COLONOSCOPY POLYPECTOMY SNARE/COLD BIOPSY with tatooing and apc transverse colon performed by Zoya Mera MD at [address] [place] [address] Right     rotator cuff     History reviewed. No pertinent family history.   Outpatient Medications Marked as Taking for the 4/16/19 encounter (Office Visit) with Kapil Lyons MD   Medication Sig Dispense Refill    ticagrelor (BRILINTA) 90 MG TABS tablet Take 1 tablet by mouth 2 times daily 60 tablet 12    nitroGLYCERIN (NITROSTAT) 0.4 MG SL tablet Place 1 tablet under the tongue every 5 minutes as needed for Chest pain If no relief of chest pain after 3rd dose, go to the ER or call 911 25 tablet 0    clotrimazole-betamethasone (LOTRISONE) 1-0.05 % cream Apply topically 2 times daily. 45 g 0    metoprolol tartrate (LOPRESSOR) 25 MG tablet Take 0.5 tablets by mouth 2 times daily 60 tablet 3    aspirin 81 MG chewable tablet Take 81 mg by mouth daily      butalbital-acetaminophen-caffeine (FIORICET, ESGIC) -40 MG per tablet Take 1 tablet by mouth every 6 hours as needed for Headaches      atorvastatin (LIPITOR) 40 MG tablet Take 1 tablet by mouth daily 30 tablet 5    doxepin (SINEQUAN) 100 MG capsule Take 100 mg by mouth nightly. Dye [iodides] and Aspirin  Social History     Tobacco Use   Smoking Status Former Smoker   Smokeless Tobacco Never Used      (If patient a smoker, smoking cessation counseling offered)   Social History     Substance and Sexual Activity   Alcohol Use No       REVIEW OF SYSTEMS:  Review of Systems    Physical Exam:    This a 77 y.o. female      Vitals:    04/16/19 1418   BP: 127/84   Pulse: 80   Temp: 97.8 °F (36.6 °C)     Body mass index is 28.75 kg/m². Physical Exam  Constitutional: Patient in no acute distress, ggod grooming, appropriately dressed  Neuro: Alert and oriented to person, place and time. Psych:Mood normal, affect normal  Skin: No rash noted  HEENT: Head: Normocephalic and atraumatic,Conjunctivae and EOM are normal,Nose- normal, Right/Left External Ear: normal, Mouth: Mucosa Moist  Neck: Supple  Lungs: Respiratory effort is normal  Cardiovascular: strong and regular, no lower leg edema  Abdomen: Soft, non-tender, non-distended with no CVA,    Lymphatics: No cervical palpable lymphadenopathy. Bladder non-tender and not distended. Musculoskeletal: Normal gait and station  : penis and testes normal        Assessment and Plan      1. Hypertrophy of prostate with urinary obstruction    2.  Nocturia            Plan:    stop proscar  No need for bph meds right now  F/u in 6

## 2019-04-22 ENCOUNTER — TELEPHONE (OUTPATIENT)
Dept: PHARMACY | Facility: CLINIC | Age: 79
End: 2019-04-22

## 2019-04-22 NOTE — TELEPHONE ENCOUNTER
Identified care gap per [employer]: ATORVASTATIN TAB 40MG   Per records, appears 30-day supply last filled 2019     Per Donita Aguilarpaige: [employer]: [phone]    Medication: ATORVASTATIN TAB 40MG  Last 3 fill dates:2019, 2019, 2019  Day supply: 30, 30, 30  Refills remainin  Directions: once a day   Insurance billed: [facility]     No patient outreach planned at this time. [facility], toll free: [phone], option 7    CLINICAL PHARMACY CONSULT: MED RECONCILIATION/REVIEW ADDENDUM    For Pharmacy Admin Tracking Only    PHSO: Yes  Total # of Interventions Recommended: 1  - New Order #: 0 New Medication Order Reason(s):  Adherence  - Maintenance Safety Lab Monitoring #: 1  - New Therapy Lab Monitoring #: 0  Recommended intervention potential cost savings: 0  Total Interventions Accepted: 0  Time Spent (min): [address]

## 2019-05-02 ENCOUNTER — HOSPITAL ENCOUNTER (OUTPATIENT)
Dept: MRI IMAGING | Age: 79
Discharge: HOME | End: 2019-05-04
Payer: MEDICARE

## 2019-05-02 DIAGNOSIS — I63.9 CEREBROVASCULAR ACCIDENT (CVA), UNSPECIFIED MECHANISM (HCC): ICD-10-CM

## 2019-05-02 PROCEDURE — 70551 MRI BRAIN STEM W/O DYE: CPT

## 2019-05-22 ENCOUNTER — HOSPITAL ENCOUNTER (OUTPATIENT)
Facility: CLINIC | Age: 79
Discharge: HOME | End: 2019-05-24
Payer: MEDICARE

## 2019-05-22 ENCOUNTER — HOSPITAL ENCOUNTER (OUTPATIENT)
Dept: GENERAL RADIOLOGY | Facility: CLINIC | Age: 79
Discharge: HOME | End: 2019-05-24
Payer: MEDICARE

## 2019-05-22 DIAGNOSIS — G89.29 CHRONIC PAIN OF LEFT KNEE: ICD-10-CM

## 2019-05-22 DIAGNOSIS — M25.562 CHRONIC PAIN OF LEFT KNEE: ICD-10-CM

## 2019-05-22 PROCEDURE — 73562 X-RAY EXAM OF KNEE 3: CPT

## 2019-06-04 DIAGNOSIS — M25.562 ACUTE PAIN OF LEFT KNEE: Primary | ICD-10-CM

## 2019-06-06 ENCOUNTER — OFFICE VISIT (OUTPATIENT)
Dept: ORTHOPEDIC SURGERY | Age: 79
End: 2019-06-06
Payer: MEDICARE

## 2019-06-06 VITALS — WEIGHT: 183 LBS | BODY MASS INDEX: 28.72 KG/M2 | HEIGHT: 67 IN

## 2019-06-06 DIAGNOSIS — M17.12 ARTHRITIS OF LEFT KNEE: Primary | ICD-10-CM

## 2019-06-06 PROCEDURE — 20610 DRAIN/INJ JOINT/BURSA W/O US: CPT | Performed by: ORTHOPAEDIC SURGERY

## 2019-06-06 PROCEDURE — 99203 OFFICE O/P NEW LOW 30 MIN: CPT | Performed by: ORTHOPAEDIC SURGERY

## 2019-06-06 RX ORDER — BUPIVACAINE HYDROCHLORIDE 2.5 MG/ML
2 INJECTION, SOLUTION INFILTRATION; PERINEURAL ONCE
Status: COMPLETED | OUTPATIENT
Start: 2019-06-06 | End: 2019-06-07

## 2019-06-06 RX ORDER — METHYLPREDNISOLONE ACETATE 80 MG/ML
80 INJECTION, SUSPENSION INTRA-ARTICULAR; INTRALESIONAL; INTRAMUSCULAR; SOFT TISSUE ONCE
Status: COMPLETED | OUTPATIENT
Start: 2019-06-06 | End: 2019-06-07

## 2019-06-06 ASSESSMENT — ENCOUNTER SYMPTOMS
CONSTIPATION: 0
COUGH: 0
DIARRHEA: 0
NAUSEA: 0

## 2019-06-06 NOTE — PROGRESS NOTES
[facility]  [facility]  [address] [place], [place] [address]y [address] [address]  Dept: [phone]    Ambulatory Orthopedic Consult      CHIEF COMPLAINT:    Chief Complaint   Patient presents with    Knee Pain     left       HISTORY OF PRESENT ILLNESS:      The patient is a 77 y.o. male who is being seen at the request of  JW Mccallum CNP for consultation and evaluation of  Left knee pain. Zaid Dominguez  presents for left knee pain that has been present for  \"many years\". The patient does not recall a specific injury. The pain improves with  rest.  The pain worsens with  stair climbing and arising from a seated position. Instability is not noted. The patient has not had a previous corticosteroid injection. The patient has not had previous physical therapy for this problem. Patient cannot take NSAID's due to his kidney disease. Patient has tried a left knee brace and icy hot.        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 [address] 3/18/2011    d/t being hurt on the job    CKD (chronic kidney disease) stage 2, GFR 60-89 ml/min     CKD (chronic kidney disease) stage 3, GFR 30-59 ml/min ([facility])     Diverticulosis of colon     GERD (gastroesophageal reflux disease)     Impaired renal function     MRSA (methicillin resistant staph aureus) culture positive 9/23/2015    leg    Osteoarthritis of knee     Left    Proteinuria     Skull fracture ([facility] [address] 3/18/2011    d/t 12-foot drop on the job    Temporary loss of eyesight     periodically, happened x7    [facility]       Past Surgical History:    Past Surgical History:   Procedure Laterality Date    COLONOSCOPY  11/02/2012    poor prep, tubular adenoma    COLONOSCOPY  09/19/2017    diverticulosis, multiple polyps as described, spot marking done, pathology-tubular adenoma x 4    WA COLSC FLX W/RMVL OF TUMOR POLYP LESION SNARE TQ N/A 9/19/2017    COLONOSCOPY POLYPECTOMY SNARE/COLD BIOPSY with tatooing and apc transverse colon performed by Antoine Donovan MD at [address] Right     rotator cuff       Current Medications:   Current Outpatient Medications   Medication Sig Dispense Refill    ticagrelor (BRILINTA) 90 MG TABS tablet Take 1 tablet by mouth 2 times daily 60 tablet 12    nitroGLYCERIN (NITROSTAT) 0.4 MG SL tablet Place 1 tablet under the tongue every 5 minutes as needed for Chest pain If no relief of chest pain after 3rd dose, go to the ER or call 911 25 tablet 0    clotrimazole-betamethasone (LOTRISONE) 1-0.05 % cream Apply topically 2 times daily. 45 g 0    metoprolol tartrate (LOPRESSOR) 25 MG tablet Take 0.5 tablets by mouth 2 times daily 60 tablet 3    butalbital-acetaminophen-caffeine (FIORICET, ESGIC) -40 MG per tablet Take 1 tablet by mouth every 6 hours as needed for Headaches      atorvastatin (LIPITOR) 40 MG tablet Take 1 tablet by mouth daily 30 tablet 5    doxepin (SINEQUAN) 100 MG capsule Take 100 mg by mouth nightly. No current facility-administered medications for this visit.         Allergies:    Dye [iodides] and Aspirin    Social History:   Social History     Socioeconomic History    Marital status: Single     Spouse name: Not on file    Number of children: Not on file    Years of education: Not on file    Highest education level: Not on file   Occupational History    Not on file   Social Needs    Financial resource strain: Not on file    Food insecurity:     Worry: Not on file     Inability: Not on file    Transportation needs:     Medical: Not on file     Non-medical: Not on file   Tobacco Use    Smoking status: Former Smoker    Smokeless tobacco: Never Used   Substance and Sexual Activity    Alcohol use: No    Drug use: No    Sexual activity: Not on file   Lifestyle    Physical activity:     Days per week: Not on file     Minutes per session: Not on file    Stress: Not on file   Relationships    Social connections:     Talks on phone: Not on file     Gets together: Not on file     Attends Anabaptism service: Not on file     Active member of club or organization: Not on file     Attends meetings of clubs or organizations: Not on file     Relationship status: Not on file    Intimate partner violence:     Fear of current or ex partner: Not on file     Emotionally abused: Not on file     Physically abused: Not on file     Forced sexual activity: Not on file   Other Topics Concern    Not on file   Social History Narrative    Not on file       Family History:  No family history on file. REVIEW OF SYSTEMS:  Review of Systems   Constitutional: Negative for chills and fever. Respiratory: Negative for cough. Gastrointestinal: Negative for constipation, diarrhea and nausea. Musculoskeletal: Positive for arthralgias (left knee). Negative for gait problem, joint swelling and myalgias. Neurological: Negative for dizziness, weakness and numbness. I have reviewed the CC, HPI, ROS, PMH, FHX, Social History, and if not present in this note, I have reviewed in the patient's chart. I agree with the documentation provided by other staff and have reviewed their documentation prior to providing my signature indicating agreement. PHYSICAL EXAM:  Ht 5' 7\" (1.702 m)   Wt 183 lb (83 kg)   BMI 28.66 kg/m²  Body mass index is 28.66 kg/m². Physical Exam  Gen: alert and oriented to person and place. Psych:  Appropriate affect; Appropriate knowledge base; Appropriate mood; No hallucinations; Head: normocephalic, atraumatic   Chest: symmetric chest excursion; nonlabored respiratory effort. Pelvis: stable; no obvious pelvis deformity  Ortho Exam  Extremity:Evaluation of the Left knee reveals no significant outward deformity. There is no erythema, warmth, skin lesions, signs of infection. There is tenderness over the medial joint line.   There is a with bone-on-bone in the medial compartment. KNEE INJECTION PROCEDURE NOTE:  The patient was identified. The left knee was confirmed with the patient. After a sterile prep with Betadine the knee was injected using a lateral joint line approach with a mixture of 2 mL of 0.25% Marcaine and 80 mg of Depo-Medrol. Patient tolerated the procedure well without post injection complications. I instructed the patient to call our office immediately if they have any swelling or increased pain at the injection site. ASSESSMENT:     1. Arthritis of left knee         PLAN:       Discussed etiology and natural history of left knee arthritis. The treatment options may include oral anti-inflammatories, bracing, injections, advanced imaging, activity modification, physical therapy and/or surgical intervention. The patient would like to proceed with left knee corticosteroid injection. The patient will follow up in 6 weeks. We discussed that the patient should call us with any concerns or questions. Return in about 6 weeks (around 7/18/2019) for re- evaluation. Orders Placed This Encounter   Medications    methylPREDNISolone acetate (DEPO-MEDROL) injection 80 mg    bupivacaine (MARCAINE) 0.25 % injection 5 mg       Orders Placed This Encounter   Procedures    MN ARTHROCENTESIS ASPIR&/INJ MAJOR JT/BURSA W/O US     I, Jannette De Jesus RN am scribing for and in the presence of Dr. Suzan Dahl  6/9/2019 8:12 PM      I have reviewed and made changes accordingly to the work scribed by Jannette De Jesus RN. The documentation accurately reflects work and decisions made by me. I have also reviewed documentation completed by clinical staff.     Suzan Dahl DO, [facility] Medicine  6/9/2019 8:12 PM    This note is created with the assistance of a speech recognition program.  While intending to generate a document that actually reflects the content of the visit, the document

## 2019-06-06 NOTE — LETTER
Dr. Vipul Jones  [address] [facility]  [address] [facility] [address] [facility] [address]  Dept: [phone]  Dept Fax: [phone]        6/9/19    Patient: Carrie Perez  YOB: 1940    Dear JW Hand CNP,    I had the pleasure of seeing one of your patients, Ricardo James today in the office. Below are the relevant portions of my assessment and plan of care. IMPRESSION:     1. Arthritis of left knee      PLAN:       Discussed etiology and natural history of left knee arthritis. The treatment options may include oral anti-inflammatories, bracing, injections, advanced imaging, activity modification, physical therapy and/or surgical intervention. The patient would like to proceed with left knee corticosteroid injection. The patient will follow up in 6 weeks. We discussed that the patient should call us with any concerns or questions. Thank you for allowing me to participate in the care of this patient. I will keep you updated on this patient's follow up and I look forward to serving you and your patients again in the future. Please don't hesitate to contact me at my mobile number [phone].         Gregoria Carrera

## 2019-06-07 RX ADMIN — METHYLPREDNISOLONE ACETATE 80 MG: 80 INJECTION, SUSPENSION INTRA-ARTICULAR; INTRALESIONAL; INTRAMUSCULAR; SOFT TISSUE at 12:21

## 2019-06-07 RX ADMIN — BUPIVACAINE HYDROCHLORIDE 5 MG: 2.5 INJECTION, SOLUTION INFILTRATION; PERINEURAL at 12:20

## 2019-07-15 ENCOUNTER — HOSPITAL ENCOUNTER (OUTPATIENT)
Dept: PHYSICAL THERAPY | Age: 79
Setting detail: THERAPIES SERIES
Discharge: HOME | End: 2019-07-15
Payer: MEDICARE

## 2019-07-15 ENCOUNTER — HOSPITAL ENCOUNTER (OUTPATIENT)
Facility: CLINIC | Age: 79
Discharge: HOME | End: 2019-07-17
Payer: MEDICARE

## 2019-07-15 ENCOUNTER — HOSPITAL ENCOUNTER (OUTPATIENT)
Dept: GENERAL RADIOLOGY | Facility: CLINIC | Age: 79
Discharge: HOME | End: 2019-07-17
Payer: MEDICARE

## 2019-07-15 ENCOUNTER — HOSPITAL ENCOUNTER (OUTPATIENT)
Age: 79
Setting detail: SPECIMEN
Discharge: HOME | End: 2019-07-15
Payer: MEDICARE

## 2019-07-15 DIAGNOSIS — M54.41 CHRONIC BILATERAL LOW BACK PAIN WITH BILATERAL SCIATICA: ICD-10-CM

## 2019-07-15 DIAGNOSIS — I10 ESSENTIAL HYPERTENSION: ICD-10-CM

## 2019-07-15 DIAGNOSIS — M54.42 CHRONIC BILATERAL LOW BACK PAIN WITH BILATERAL SCIATICA: ICD-10-CM

## 2019-07-15 DIAGNOSIS — G89.29 CHRONIC BILATERAL LOW BACK PAIN WITH BILATERAL SCIATICA: ICD-10-CM

## 2019-07-15 LAB
ABSOLUTE EOS #: 0.34 K/UL (ref 0–0.44)
ABSOLUTE IMMATURE GRANULOCYTE: <0.03 K/UL (ref 0–0.3)
ABSOLUTE LYMPH #: 1.82 K/UL (ref 1.1–3.7)
ABSOLUTE MONO #: 0.61 K/UL (ref 0.1–1.2)
ANION GAP SERPL CALCULATED.3IONS-SCNC: 13 MMOL/L (ref 9–17)
BASOPHILS # BLD: 1 % (ref 0–2)
BASOPHILS ABSOLUTE: 0.04 K/UL (ref 0–0.2)
BUN BLDV-MCNC: 12 MG/DL (ref 8–23)
BUN/CREAT BLD: ABNORMAL (ref 9–20)
CALCIUM SERPL-MCNC: 9.5 MG/DL (ref 8.6–10.4)
CHLORIDE BLD-SCNC: 100 MMOL/L (ref 98–107)
CO2: 25 MMOL/L (ref 20–31)
CREAT SERPL-MCNC: 1.32 MG/DL (ref 0.7–1.2)
DIFFERENTIAL TYPE: ABNORMAL
EOSINOPHILS RELATIVE PERCENT: 5 % (ref 1–4)
GFR AFRICAN AMERICAN: >60 ML/MIN
GFR NON-AFRICAN AMERICAN: 52 ML/MIN
GFR SERPL CREATININE-BSD FRML MDRD: ABNORMAL ML/MIN/{1.73_M2}
GFR SERPL CREATININE-BSD FRML MDRD: ABNORMAL ML/MIN/{1.73_M2}
GLUCOSE BLD-MCNC: 107 MG/DL (ref 70–99)
HCT VFR BLD CALC: 42.4 % (ref 40.7–50.3)
HEMOGLOBIN: 13.3 G/DL (ref 13–17)
IMMATURE GRANULOCYTES: 0 %
LYMPHOCYTES # BLD: 28 % (ref 24–43)
MCH RBC QN AUTO: 28.9 PG (ref 25.2–33.5)
MCHC RBC AUTO-ENTMCNC: 31.4 G/DL (ref 28.4–34.8)
MCV RBC AUTO: 92 FL (ref 82.6–102.9)
MONOCYTES # BLD: 9 % (ref 3–12)
NRBC AUTOMATED: 0 PER 100 WBC
PDW BLD-RTO: 14.1 % (ref 11.8–14.4)
PLATELET # BLD: 237 K/UL (ref 138–453)
PLATELET ESTIMATE: ABNORMAL
PMV BLD AUTO: 9.9 FL (ref 8.1–13.5)
POTASSIUM SERPL-SCNC: 5 MMOL/L (ref 3.7–5.3)
RBC # BLD: 4.61 M/UL (ref 4.21–5.77)
RBC # BLD: ABNORMAL 10*6/UL
SEG NEUTROPHILS: 57 % (ref 36–65)
SEGMENTED NEUTROPHILS ABSOLUTE COUNT: 3.76 K/UL (ref 1.5–8.1)
SODIUM BLD-SCNC: 138 MMOL/L (ref 135–144)
WBC # BLD: 6.6 K/UL (ref 3.5–11.3)
WBC # BLD: ABNORMAL 10*3/UL

## 2019-07-15 PROCEDURE — G0283 ELEC STIM OTHER THAN WOUND: HCPCS

## 2019-07-15 PROCEDURE — 97161 PT EVAL LOW COMPLEX 20 MIN: CPT

## 2019-07-15 PROCEDURE — 72100 X-RAY EXAM L-S SPINE 2/3 VWS: CPT

## 2019-07-15 ASSESSMENT — PAIN SCALES - GENERAL: PAINLEVEL_OUTOF10: 6

## 2019-07-15 ASSESSMENT — PAIN DESCRIPTION - PAIN TYPE: TYPE: CHRONIC PAIN

## 2019-07-15 ASSESSMENT — PAIN DESCRIPTION - DESCRIPTORS: DESCRIPTORS: SHARP;ACHING

## 2019-07-15 ASSESSMENT — PAIN DESCRIPTION - ORIENTATION: ORIENTATION: LOWER

## 2019-07-15 ASSESSMENT — PAIN DESCRIPTION - LOCATION: LOCATION: BACK

## 2019-07-15 ASSESSMENT — PAIN DESCRIPTION - FREQUENCY: FREQUENCY: INTERMITTENT

## 2019-07-15 NOTE — PROGRESS NOTES
strength B hip 5/5  Short term goal 4: indep with HEP  Long term goals  Time Frame for Long term goals : 12 visits  Long term goal 1: improve oswestry score from 71 to 61 % or better  Patient Goals   Patient goals : relieve back pain     Treatment Charges: Minutes Units   []  Ultrasound     [x]  Electrical-Stim 20 1   []  Iontophoresis     []  Traction     []  Massage       [x]  Eval 20 1   []  Gait     [x]  Ther Exercise 5  nc    []  Manual Therapy       []  Ther Activities       []  Aquatics     []  Vasopneumatic Device     []  Neuro Re-Ed       []  Other       Total Treatment Time: 45 2        Therapy Time   Individual Concurrent Group Co-treatment   Time In 1530         Time Out 1615         Minutes 45         Timed Code Treatment Minutes: 5 Minutes     Patient Goals:relieve back pain    Comments/Assessment:    Rehab Potential:  [x] Good  [] Fair  [] Poor   Suggested Professional Referral:  [x] No  [] Yes:  Barriers to Goal Achievement:  [] No  [x] Yes:chronic  Domestic Concerns:  [x] No  [] Yes:    Treatment Plan:  [x] Therapeutic Exercise    [] Modalities:  [] Therapeutic Activity    [] Ultrasound  [x] Electrical Stimulation  [] Gait Training     [] Massage       [] Lumbar/Cervical Traction  [] Neuromuscular Re-education [x] Cold/hot pack [] Other:  [x] Instruction in HEP              [] Work Conditioning                                  [] Manual Therapy                     [] Aquatic Therapy     [] Vasocompression  [] Iontophoresis: 4 mg/mL                      Dexamethasone Sodium      Phosphate 40-80mAmin (Allergies Reviewed)     Frequency:         2  X/wk x         6 wk's      [x] Plans/Goals, Risk/Benefits discussed with pt/family  Comprehension of Education [x] yes  [] Needs Review  Pt/Family Education: [x] Verbal  [x] Demo  [x] Written    More objective information is available upon request.  Thank you for this referral.        Medicare/Regulatory Requirements:  I have reviewed this plan of care and certify a need for   Medically necessary rehabilitation services.   [] Physician Signature    Date:     Electronically signed by: Betsy Gonzalez, [address] [facility]  [place], [address] [facility]  Phone [phone]  Fax [phone]

## 2019-07-18 ENCOUNTER — HOSPITAL ENCOUNTER (OUTPATIENT)
Dept: PHYSICAL THERAPY | Age: 79
Setting detail: THERAPIES SERIES
Discharge: HOME | End: 2019-07-18
Payer: MEDICARE

## 2019-07-18 PROCEDURE — 97110 THERAPEUTIC EXERCISES: CPT

## 2019-07-18 PROCEDURE — G0283 ELEC STIM OTHER THAN WOUND: HCPCS

## 2019-07-18 ASSESSMENT — PAIN DESCRIPTION - ORIENTATION: ORIENTATION: LOWER

## 2019-07-18 ASSESSMENT — PAIN SCALES - GENERAL: PAINLEVEL_OUTOF10: 5

## 2019-07-18 ASSESSMENT — PAIN DESCRIPTION - FREQUENCY: FREQUENCY: INTERMITTENT

## 2019-07-18 ASSESSMENT — PAIN DESCRIPTION - LOCATION: LOCATION: BACK

## 2019-10-07 ENCOUNTER — TELEPHONE (OUTPATIENT)
Dept: GASTROENTEROLOGY | Age: 79
End: 2019-10-07

## 2019-10-15 ENCOUNTER — TELEPHONE (OUTPATIENT)
Dept: UROLOGY | Age: 79
End: 2019-10-15

## 2019-12-11 ENCOUNTER — TELEPHONE (OUTPATIENT)
Dept: GASTROENTEROLOGY | Age: 79
End: 2019-12-11

## 2020-01-16 PROBLEM — G44.329 CHRONIC POST-TRAUMATIC HEADACHE, NOT INTRACTABLE: Status: ACTIVE | Noted: 2020-01-16

## 2020-01-16 PROBLEM — E78.00 PURE HYPERCHOLESTEROLEMIA: Status: ACTIVE | Noted: 2020-01-16

## 2020-01-16 PROBLEM — G20 PARKINSON DISEASE (HCC): Status: ACTIVE | Noted: 2020-01-16

## 2020-01-16 PROBLEM — G20.A1 PARKINSON DISEASE: Status: ACTIVE | Noted: 2020-01-16

## 2020-01-16 PROBLEM — R73.03 PREDIABETES: Status: ACTIVE | Noted: 2020-01-16

## 2020-02-12 ENCOUNTER — TELEPHONE (OUTPATIENT)
Dept: GASTROENTEROLOGY | Age: 80
End: 2020-02-12

## 2020-02-12 NOTE — TELEPHONE ENCOUNTER
Attempt #1 called patient LVM for patient to return our call and schedule an appointment per referral

## 2020-03-13 ENCOUNTER — HOSPITAL ENCOUNTER (EMERGENCY)
Age: 80
Discharge: HOME | End: 2020-03-14
Attending: EMERGENCY MEDICINE
Payer: MEDICARE

## 2020-03-13 PROCEDURE — 99284 EMERGENCY DEPT VISIT MOD MDM: CPT

## 2020-03-13 ASSESSMENT — PAIN DESCRIPTION - ORIENTATION: ORIENTATION: RIGHT

## 2020-03-13 ASSESSMENT — PAIN DESCRIPTION - PAIN TYPE: TYPE: CHRONIC PAIN

## 2020-03-13 ASSESSMENT — PAIN DESCRIPTION - LOCATION: LOCATION: FOOT

## 2020-03-13 ASSESSMENT — PAIN SCALES - GENERAL: PAINLEVEL_OUTOF10: 6

## 2020-03-14 ENCOUNTER — APPOINTMENT (OUTPATIENT)
Dept: CT IMAGING | Age: 80
End: 2020-03-14
Payer: MEDICARE

## 2020-03-14 ENCOUNTER — APPOINTMENT (OUTPATIENT)
Dept: GENERAL RADIOLOGY | Age: 80
End: 2020-03-14
Payer: MEDICARE

## 2020-03-14 VITALS
BODY MASS INDEX: 28.25 KG/M2 | HEART RATE: 61 BPM | HEIGHT: 67 IN | SYSTOLIC BLOOD PRESSURE: 139 MMHG | WEIGHT: 180 LBS | RESPIRATION RATE: 18 BRPM | OXYGEN SATURATION: 100 % | DIASTOLIC BLOOD PRESSURE: 80 MMHG | TEMPERATURE: 97.3 F

## 2020-03-14 LAB
ABSOLUTE EOS #: 0.26 K/UL (ref 0–0.44)
ABSOLUTE IMMATURE GRANULOCYTE: <0.03 K/UL (ref 0–0.3)
ABSOLUTE LYMPH #: 1.94 K/UL (ref 1.1–3.7)
ABSOLUTE MONO #: 0.78 K/UL (ref 0.1–1.2)
ANION GAP SERPL CALCULATED.3IONS-SCNC: 10 MMOL/L (ref 9–17)
BASOPHILS # BLD: 0 % (ref 0–2)
BASOPHILS ABSOLUTE: 0.03 K/UL (ref 0–0.2)
BUN BLDV-MCNC: 14 MG/DL (ref 8–23)
BUN/CREAT BLD: ABNORMAL (ref 9–20)
CALCIUM SERPL-MCNC: 9.3 MG/DL (ref 8.6–10.4)
CHLORIDE BLD-SCNC: 101 MMOL/L (ref 98–107)
CO2: 24 MMOL/L (ref 20–31)
CREAT SERPL-MCNC: 1.22 MG/DL (ref 0.7–1.2)
DIFFERENTIAL TYPE: ABNORMAL
EKG ATRIAL RATE: 63 BPM
EKG P AXIS: 29 DEGREES
EKG P-R INTERVAL: 170 MS
EKG Q-T INTERVAL: 420 MS
EKG QRS DURATION: 100 MS
EKG QTC CALCULATION (BAZETT): 429 MS
EKG R AXIS: 47 DEGREES
EKG T AXIS: 20 DEGREES
EKG VENTRICULAR RATE: 63 BPM
EOSINOPHILS RELATIVE PERCENT: 4 % (ref 1–4)
GFR AFRICAN AMERICAN: >60 ML/MIN
GFR NON-AFRICAN AMERICAN: 57 ML/MIN
GFR SERPL CREATININE-BSD FRML MDRD: ABNORMAL ML/MIN/{1.73_M2}
GFR SERPL CREATININE-BSD FRML MDRD: ABNORMAL ML/MIN/{1.73_M2}
GLUCOSE BLD-MCNC: 102 MG/DL (ref 70–99)
HCT VFR BLD CALC: 38.4 % (ref 40.7–50.3)
HEMOGLOBIN: 11.6 G/DL (ref 13–17)
IMMATURE GRANULOCYTES: 0 %
LYMPHOCYTES # BLD: 28 % (ref 24–43)
MCH RBC QN AUTO: 24.8 PG (ref 25.2–33.5)
MCHC RBC AUTO-ENTMCNC: 30.2 G/DL (ref 28.4–34.8)
MCV RBC AUTO: 82.2 FL (ref 82.6–102.9)
MONOCYTES # BLD: 11 % (ref 3–12)
NRBC AUTOMATED: 0 PER 100 WBC
PDW BLD-RTO: 19.1 % (ref 11.8–14.4)
PLATELET # BLD: 247 K/UL (ref 138–453)
PLATELET ESTIMATE: ABNORMAL
PMV BLD AUTO: 9.2 FL (ref 8.1–13.5)
POTASSIUM SERPL-SCNC: 4.6 MMOL/L (ref 3.7–5.3)
RBC # BLD: 4.67 M/UL (ref 4.21–5.77)
RBC # BLD: ABNORMAL 10*6/UL
SEG NEUTROPHILS: 57 % (ref 36–65)
SEGMENTED NEUTROPHILS ABSOLUTE COUNT: 3.94 K/UL (ref 1.5–8.1)
SODIUM BLD-SCNC: 135 MMOL/L (ref 135–144)
TROPONIN INTERP: NORMAL
TROPONIN T: NORMAL NG/ML
TROPONIN, HIGH SENSITIVITY: 22 NG/L (ref 0–22)
WBC # BLD: 7 K/UL (ref 3.5–11.3)
WBC # BLD: ABNORMAL 10*3/UL

## 2020-03-14 PROCEDURE — 85025 COMPLETE CBC W/AUTO DIFF WBC: CPT

## 2020-03-14 PROCEDURE — 80048 BASIC METABOLIC PNL TOTAL CA: CPT

## 2020-03-14 PROCEDURE — 72131 CT LUMBAR SPINE W/O DYE: CPT

## 2020-03-14 PROCEDURE — 84484 ASSAY OF TROPONIN QUANT: CPT

## 2020-03-14 PROCEDURE — 73523 X-RAY EXAM HIPS BI 5/> VIEWS: CPT

## 2020-03-14 PROCEDURE — 93005 ELECTROCARDIOGRAM TRACING: CPT | Performed by: STUDENT IN AN ORGANIZED HEALTH CARE EDUCATION/TRAINING PROGRAM

## 2020-03-14 RX ORDER — LIDOCAINE 4 G/G
1 PATCH TOPICAL DAILY
Qty: 30 PATCH | Refills: 0 | Status: SHIPPED | OUTPATIENT
Start: 2020-03-14 | End: 2020-04-13

## 2020-03-14 RX ORDER — LIDOCAINE 4 G/G
1 PATCH TOPICAL DAILY
Status: DISCONTINUED | OUTPATIENT
Start: 2020-03-14 | End: 2020-03-14 | Stop reason: HOSPADM

## 2020-03-14 RX ORDER — ONDANSETRON 2 MG/ML
4 INJECTION INTRAMUSCULAR; INTRAVENOUS ONCE
Status: DISCONTINUED | OUTPATIENT
Start: 2020-03-14 | End: 2020-03-14 | Stop reason: HOSPADM

## 2020-03-14 RX ORDER — IBUPROFEN 400 MG/1
400 TABLET ORAL EVERY 6 HOURS PRN
Qty: 120 TABLET | Refills: 0 | Status: SHIPPED | OUTPATIENT
Start: 2020-03-14 | End: 2020-08-06 | Stop reason: ALTCHOICE

## 2020-03-14 RX ORDER — ACETAMINOPHEN 325 MG/1
325 TABLET ORAL EVERY 6 HOURS PRN
Qty: 120 TABLET | Refills: 0 | Status: SHIPPED | OUTPATIENT
Start: 2020-03-14 | End: 2020-09-17 | Stop reason: ALTCHOICE

## 2020-03-14 NOTE — ED PROVIDER NOTES
FACULTY SIGN-OUT  ADDENDUM       Patient: Herrera Wilson   MRN: [id number]  PCP:  JW Gray CNP  The patient's initial evaluation and plan have been discussed with the prior provider who initially evaluated the patient. Nursing Notes, Past Medical Hx, Past Surgical Hx, Social Hx, Allergies, and Family Hx were all reviewed. Pertinent Comments: The patient is a [de-identified] y.o. male taken in signout with foot pain and right lower extremity pain with possible weakness mildly with some concern for possible disc affecting the right lower leg. We are awaiting work-up as well as CT of the lumbar spine and reevaluation after pain control.    If still having significant problems possible admission observation unit for MRI in the morning    ED COURSE      The patient was given the following medications:  Orders Placed This Encounter   Medications    ondansetron (ZOFRAN) injection 4 mg       RECENT VITALS:   BP: 139/80  Pulse: 61  Resp: 18  Temp: 97.3 °F (36.3 °C) SpO2: 100 %    (Please note that portions of this note were completed with a voice recognition program.  Efforts were made to edit the dictations but occasionally words are mis-transcribed.)    MD Cheyenne Peña  Attending Emergency Medicine Physician        Toño Benavidez MD  03/14/20 0110

## 2020-03-14 NOTE — ED PROVIDER NOTES
[facility] ED     Emergency Department     Faculty Attestation        I performed a history and physical examination of the patient and discussed management with the resident. I reviewed the residents note and agree with the documented findings and plan of care. Any areas of disagreement are noted on the chart. I was personally present for the key portions of any procedures. I have documented in the chart those procedures where I was not present during the key portions. I have reviewed the emergency nurses triage note. I agree with the chief complaint, past medical history, past surgical history, allergies, medications, social and family history as documented unless otherwise noted below. For mid-level providers such as nurse practitioners as well as physicians assistants:    I have personally seen and evaluated the patient. I find the patient's history and physical exam are consistent with NP/PA documentation. I agree with the care provided, treatment rendered, disposition, & follow-up plan. Additional findings are as noted.     Vital Signs: /73   Pulse 66   Temp 97.3 °F (36.3 °C) (Oral)   Resp 16   Ht 5' 7\" (1.702 m)   Wt 180 lb (81.6 kg)   SpO2 100%   BMI 28.19 kg/m²   PCP:  Shiloh Ramirez, JW - CNP    Pertinent Comments:           Critical Care  None          Simeon Ron MD  Attending Emergency Medicine Physician              Mao Jordan MD  03/14/20 0399

## 2020-03-14 NOTE — ED PROVIDER NOTES
[facility]), Temporary loss of eyesight, and Tubular adenoma of colon. has a past surgical history that includes Colonoscopy (11/02/2012); shoulder surgery (Right); pr colsc flx w/rmvl of tumor polyp lesion snare tq (N/A, 9/19/2017); and Colonoscopy (09/19/2017). Social History     Socioeconomic History    Marital status: Single     Spouse name: Not on file    Number of children: Not on file    Years of education: Not on file    Highest education level: Not on file   Occupational History    Not on file   Social Needs    Financial resource strain: Not on file    Food insecurity     Worry: Not on file     Inability: Not on file    Transportation needs     Medical: Not on file     Non-medical: Not on file   Tobacco Use    Smoking status: Former Smoker    Smokeless tobacco: Never Used   Substance and Sexual Activity    Alcohol use: No    Drug use: No    Sexual activity: Not on file   Lifestyle    Physical activity     Days per week: Not on file     Minutes per session: Not on file    Stress: Not on file   Relationships    Social connections     Talks on phone: Not on file     Gets together: Not on file     Attends [employer]ist service: Not on file     Active member of club or organization: Not on file     Attends meetings of clubs or organizations: Not on file     Relationship status: Not on file    Intimate partner violence     Fear of current or ex partner: Not on file     Emotionally abused: Not on file     Physically abused: Not on file     Forced sexual activity: Not on file   Other Topics Concern    Not on file   Social History Narrative    Not on file       History reviewed. No pertinent family history. Allergies:  Dye [iodides] and Aspirin    Home Medications:  Prior to Admission medications    Medication Sig Start Date End Date Taking?  Authorizing Provider   acetaminophen (TYLENOL) 325 MG tablet Take 1 tablet by mouth every 6 hours as needed for Pain 3/14/20  Yes [facility], DO ibuprofen (IBU) 400 MG tablet Take 1 tablet by mouth every 6 hours as needed for Pain 3/14/20  Yes Mai Kat,    lidocaine 4 % external patch Place 1 patch onto the skin daily 3/14/20 4/13/20 Yes Mai Kat, DO   carbidopa-levodopa (SINEMET)  MG per tablet  1/7/20   Historical Provider, MD   [facility] ([address] [facility]) CAPS Take by mouth    Historical Provider, MD   esomeprazole Magnesium (NEXIUM) 20 MG PACK Take 20 mg by mouth daily    Historical Provider, MD   metoprolol tartrate (LOPRESSOR) 25 MG tablet Take 0.5 tablets by mouth 2 times daily 1/16/20   JW Rivas CNP   atorvastatin (LIPITOR) 40 MG tablet Take 1 tablet by mouth daily 1/16/20   JW Rivas CNP   ticagrelor (BRILINTA) 90 MG TABS tablet Take 1 tablet by mouth 2 times daily 1/16/20   JW Rivas CNP   nitroGLYCERIN (NITROSTAT) 0.4 MG SL tablet Place 1 tablet under the tongue every 5 minutes as needed for Chest pain If no relief of chest pain after 3rd dose, go to the ER or call 911 4/2/19   JW Rivas CNP   clotrimazole-betamethasone (LOTRISONE) 1-0.05 % cream Apply topically 2 times daily. Patient not taking: Reported on 1/16/2020 4/2/19   JW Rivas CNP   butalbital-acetaminophen-caffeine (FIORICET, ESGIC) -00 MG per tablet Take 1 tablet by mouth every 6 hours as needed for Headaches    Historical Provider, MD   doxepin (SINEQUAN) 100 MG capsule Take 100 mg by mouth nightly.       Historical Provider, MD       REVIEW OFSYSTEMS    (2-9 systems for level 4, 10 or more for level 5)      Constitutional ROS - No recent fevers, No recent chills  Neurological ROS - No Headache, No Syncope  Opthalmologic ROS- No eye pain, No vision changes   ENT ROS - No sore throat, No congestion  Respiratory ROS - No cough, No shortness of breath  Cardiovascular ROS - No chest pain, No palpitations   Gastrointestinal ROS - No abdominal pain, No Dispense:  120 tablet     Refill:  0    ibuprofen (IBU) 400 MG tablet     Sig: Take 1 tablet by mouth every 6 hours as needed for Pain     Dispense:  120 tablet     Refill:  0    lidocaine 4 % external patch     Sig: Place 1 patch onto the skin daily     Dispense:  30 patch     Refill:  0    lidocaine 4 % external patch 1 patch       DDX: Space-occupying lesion, cauda equina syndrome, musculoskeletal pain, bulging disc disorder, femur fracture, pelvic fracture, MI, ACS, CVA    Initial MDM/Plan: [de-identified] y.o. male who presents with vague complaints, there is concern for right lower extremity weakness and pain. Patient does state that he has had this bug bite on his right side of his foot and he is concerned that this is causing his leg pain and ascends up his leg. Patient states that the pain comes and goes. Patient mostly complains of right thigh pain. Patient denies any fever, difficulty with urinating or urinary incontinence. During examination for low back and lower extremity patient became nauseated. We will perform a cardiac work-up to include EKG troponin, basic labs, CT lumbar spine as there is concern for a occult mass causing lateral leg pain, will do a bedside abdominal aortic ultrasound for concern of AAA,. Patient drove here today and was has been able to ambulate. Patient does admit that this is chronic pain that has been intermittent since 2005. Likely patient will be discharged home, disposition will depend upon diagnostic imaging.     DIAGNOSTIC RESULTS / EMERGENCYDEPARTMENT COURSE / MDM     LABS:  Labs Reviewed   CBC WITH AUTO DIFFERENTIAL - Abnormal; Notable for the following components:       Result Value    Hemoglobin 11.6 (*)     Hematocrit 38.4 (*)     MCV 82.2 (*)     MCH 24.8 (*)     RDW 19.1 (*)     All other components within normal limits   BASIC METABOLIC PANEL - Abnormal; Notable for the following components:    Glucose 102 (*)     CREATININE 1.22 (*)     GFR Non- 57 (*)     All other components within normal limits   TROPONIN         RADIOLOGY:  Ct Lumbar Spine Wo Contrast    Result Date: 3/14/2020  EXAMINATION: CT OF THE LUMBAR SPINE WITHOUT CONTRAST  3/14/2020 TECHNIQUE: CT of the lumbar spine was performed without the administration of intravenous contrast. Multiplanar reformatted images are provided for review. Dose modulation, iterative reconstruction, and/or weight based adjustment of the mA/kV was utilized to reduce the radiation dose to as low as reasonably achievable. COMPARISON: 03/18/2011 HISTORY: ORDERING SYSTEM PROVIDED HISTORY: pain TECHNOLOGIST PROVIDED HISTORY: pain Reason for Exam: pain; pain rle FINDINGS: BONES/ALIGNMENT: There is 5 mm of retrolisthesis of L1 on L2 and 6 mm of anterolisthesis of L4 on L5 (previously 3 and 4 mm respectively) with resulting moderate central canal stenosis, AP canal estimated at 6 mm in AP dimension. .  The vertebral body heights are maintained. No osseous destructive lesion is seen. DEGENERATIVE CHANGES: Multilevel DDD and facet arthrosis. SOFT TISSUES/RETROPERITONEUM: No paraspinal mass is seen. Degenerative changes and anterolisthesis as described with resulting central canal stenosis at L4-5. Xr Hip W Pelvis Min 5 Vws Bilateral    Result Date: 3/14/2020  EXAMINATION: ONE XRAY VIEW OF THE PELVIS AND TWO XRAY VIEWS OF EACH OF THE BILATERAL HIPS 3/14/2020 1:10 am COMPARISON: None. HISTORY: ORDERING SYSTEM PROVIDED HISTORY: righ thigh pain TECHNOLOGIST PROVIDED HISTORY: righ thigh pain Reason for Exam: pt states fall in 2011,pain since,worse on rt side FINDINGS: Bilateral hip joint spaces are normal.  No significant degenerative osteoarthritis. No evidence of an acute fracture or dislocation. There are mild degenerative changes in lower lumbar spine. Sacroiliac joints are unremarkable. Pubic symphysis is symmetric. Normal pelvis and bilateral hip examination for patient age.   There is no significant hip Resident    (Please note that portions of this note were completed with a voice recognition program.Efforts were made to edit the dictations but occasionally words are mis-transcribed.)        Mansi Molina DO  Resident  03/14/20 6833

## 2020-03-14 NOTE — ED NOTES
Pt. Requesting nausea medication; Dr. Kellie Dunbar aware     Gricelda Pearl, MARCO ANTONIO  03/14/20 5618

## 2020-03-19 ENCOUNTER — TELEPHONE (OUTPATIENT)
Dept: GASTROENTEROLOGY | Age: 80
End: 2020-03-19

## 2020-03-20 ENCOUNTER — HOSPITAL ENCOUNTER (OUTPATIENT)
Age: 80
Setting detail: OBSERVATION
Discharge: NURSING, REHAB OR LONG-TERM CARE | End: 2020-03-21
Attending: EMERGENCY MEDICINE | Admitting: EMERGENCY MEDICINE
Payer: MEDICARE

## 2020-03-20 ENCOUNTER — APPOINTMENT (OUTPATIENT)
Dept: GENERAL RADIOLOGY | Age: 80
End: 2020-03-20
Payer: MEDICARE

## 2020-03-20 ENCOUNTER — APPOINTMENT (OUTPATIENT)
Dept: CT IMAGING | Age: 80
End: 2020-03-20
Payer: MEDICARE
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
## 2020-03-20 LAB
ABSOLUTE EOS #: <0.03 K/UL (ref 0–0.44)
ABSOLUTE IMMATURE GRANULOCYTE: 0.05 K/UL (ref 0–0.3)
ABSOLUTE LYMPH #: 1.03 K/UL (ref 1.1–3.7)
ABSOLUTE MONO #: 0.79 K/UL (ref 0.1–1.2)
ANION GAP SERPL CALCULATED.3IONS-SCNC: 13 MMOL/L (ref 9–17)
BASOPHILS # BLD: 0 % (ref 0–2)
BASOPHILS ABSOLUTE: 0.03 K/UL (ref 0–0.2)
BUN BLDV-MCNC: 17 MG/DL (ref 8–23)
BUN/CREAT BLD: ABNORMAL (ref 9–20)
CALCIUM SERPL-MCNC: 9.1 MG/DL (ref 8.6–10.4)
CHLORIDE BLD-SCNC: 99 MMOL/L (ref 98–107)
CO2: 23 MMOL/L (ref 20–31)
CREAT SERPL-MCNC: 1.37 MG/DL (ref 0.7–1.2)
DIFFERENTIAL TYPE: ABNORMAL
EOSINOPHILS RELATIVE PERCENT: 0 % (ref 1–4)
GFR AFRICAN AMERICAN: >60 ML/MIN
GFR NON-AFRICAN AMERICAN: 50 ML/MIN
GFR SERPL CREATININE-BSD FRML MDRD: ABNORMAL ML/MIN/{1.73_M2}
GFR SERPL CREATININE-BSD FRML MDRD: ABNORMAL ML/MIN/{1.73_M2}
GLUCOSE BLD-MCNC: 166 MG/DL (ref 70–99)
HCT VFR BLD CALC: 37.6 % (ref 40.7–50.3)
HEMOGLOBIN: 11.3 G/DL (ref 13–17)
IMMATURE GRANULOCYTES: 1 %
LYMPHOCYTES # BLD: 11 % (ref 24–43)
MCH RBC QN AUTO: 24.5 PG (ref 25.2–33.5)
MCHC RBC AUTO-ENTMCNC: 30.1 G/DL (ref 28.4–34.8)
MCV RBC AUTO: 81.6 FL (ref 82.6–102.9)
MONOCYTES # BLD: 8 % (ref 3–12)
NRBC AUTOMATED: 0 PER 100 WBC
PDW BLD-RTO: 18.8 % (ref 11.8–14.4)
PLATELET # BLD: 260 K/UL (ref 138–453)
PLATELET ESTIMATE: ABNORMAL
PMV BLD AUTO: 8.7 FL (ref 8.1–13.5)
POTASSIUM SERPL-SCNC: 4.4 MMOL/L (ref 3.7–5.3)
RBC # BLD: 4.61 M/UL (ref 4.21–5.77)
RBC # BLD: ABNORMAL 10*6/UL
SEG NEUTROPHILS: 81 % (ref 36–65)
SEGMENTED NEUTROPHILS ABSOLUTE COUNT: 7.92 K/UL (ref 1.5–8.1)
SODIUM BLD-SCNC: 135 MMOL/L (ref 135–144)
TROPONIN INTERP: ABNORMAL
TROPONIN INTERP: NORMAL
TROPONIN T: ABNORMAL NG/ML
TROPONIN T: NORMAL NG/ML
TROPONIN, HIGH SENSITIVITY: 22 NG/L (ref 0–22)
TROPONIN, HIGH SENSITIVITY: 24 NG/L (ref 0–22)
WBC # BLD: 9.8 K/UL (ref 3.5–11.3)
WBC # BLD: ABNORMAL 10*3/UL

## 2020-03-20 PROCEDURE — 71046 X-RAY EXAM CHEST 2 VIEWS: CPT

## 2020-03-20 PROCEDURE — 80048 BASIC METABOLIC PNL TOTAL CA: CPT

## 2020-03-20 PROCEDURE — 6360000002 HC RX W HCPCS: Performed by: STUDENT IN AN ORGANIZED HEALTH CARE EDUCATION/TRAINING PROGRAM

## 2020-03-20 PROCEDURE — 96374 THER/PROPH/DIAG INJ IV PUSH: CPT

## 2020-03-20 PROCEDURE — 72125 CT NECK SPINE W/O DYE: CPT

## 2020-03-20 PROCEDURE — 70450 CT HEAD/BRAIN W/O DYE: CPT

## 2020-03-20 PROCEDURE — 73502 X-RAY EXAM HIP UNI 2-3 VIEWS: CPT

## 2020-03-20 PROCEDURE — 96375 TX/PRO/DX INJ NEW DRUG ADDON: CPT

## 2020-03-20 PROCEDURE — 99285 EMERGENCY DEPT VISIT HI MDM: CPT

## 2020-03-20 PROCEDURE — 73562 X-RAY EXAM OF KNEE 3: CPT

## 2020-03-20 PROCEDURE — 73070 X-RAY EXAM OF ELBOW: CPT

## 2020-03-20 PROCEDURE — 93005 ELECTROCARDIOGRAM TRACING: CPT | Performed by: STUDENT IN AN ORGANIZED HEALTH CARE EDUCATION/TRAINING PROGRAM

## 2020-03-20 PROCEDURE — 85025 COMPLETE CBC W/AUTO DIFF WBC: CPT

## 2020-03-20 PROCEDURE — 6370000000 HC RX 637 (ALT 250 FOR IP): Performed by: STUDENT IN AN ORGANIZED HEALTH CARE EDUCATION/TRAINING PROGRAM

## 2020-03-20 PROCEDURE — G0378 HOSPITAL OBSERVATION PER HR: HCPCS

## 2020-03-20 PROCEDURE — 84484 ASSAY OF TROPONIN QUANT: CPT

## 2020-03-20 PROCEDURE — 72170 X-RAY EXAM OF PELVIS: CPT

## 2020-03-20 RX ORDER — ATORVASTATIN CALCIUM 40 MG/1
40 TABLET, FILM COATED ORAL NIGHTLY
Status: DISCONTINUED | OUTPATIENT
Start: 2020-03-20 | End: 2020-03-22 | Stop reason: HOSPADM

## 2020-03-20 RX ORDER — FENTANYL CITRATE 50 UG/ML
50 INJECTION, SOLUTION INTRAMUSCULAR; INTRAVENOUS ONCE
Status: COMPLETED | OUTPATIENT
Start: 2020-03-20 | End: 2020-03-20

## 2020-03-20 RX ORDER — SODIUM CHLORIDE 0.9 % (FLUSH) 0.9 %
10 SYRINGE (ML) INJECTION EVERY 12 HOURS SCHEDULED
Status: DISCONTINUED | OUTPATIENT
Start: 2020-03-20 | End: 2020-03-22 | Stop reason: HOSPADM

## 2020-03-20 RX ORDER — LIDOCAINE 4 G/G
1 PATCH TOPICAL DAILY
Status: DISCONTINUED | OUTPATIENT
Start: 2020-03-21 | End: 2020-03-22 | Stop reason: HOSPADM

## 2020-03-20 RX ORDER — SODIUM CHLORIDE 0.9 % (FLUSH) 0.9 %
10 SYRINGE (ML) INJECTION PRN
Status: DISCONTINUED | OUTPATIENT
Start: 2020-03-20 | End: 2020-03-22 | Stop reason: HOSPADM

## 2020-03-20 RX ORDER — DOXEPIN HYDROCHLORIDE 25 MG/1
100 CAPSULE ORAL NIGHTLY
Status: DISCONTINUED | OUTPATIENT
Start: 2020-03-20 | End: 2020-03-22 | Stop reason: HOSPADM

## 2020-03-20 RX ORDER — ACETAMINOPHEN 325 MG/1
325 TABLET ORAL EVERY 6 HOURS PRN
Status: DISCONTINUED | OUTPATIENT
Start: 2020-03-20 | End: 2020-03-22 | Stop reason: HOSPADM

## 2020-03-20 RX ORDER — ONDANSETRON 2 MG/ML
4 INJECTION INTRAMUSCULAR; INTRAVENOUS ONCE
Status: COMPLETED | OUTPATIENT
Start: 2020-03-20 | End: 2020-03-20

## 2020-03-20 RX ORDER — OXYCODONE HYDROCHLORIDE 5 MG/1
5 TABLET ORAL EVERY 6 HOURS PRN
Status: DISCONTINUED | OUTPATIENT
Start: 2020-03-20 | End: 2020-03-22 | Stop reason: HOSPADM

## 2020-03-20 RX ORDER — ACETAMINOPHEN 325 MG/1
650 TABLET ORAL EVERY 4 HOURS PRN
Status: DISCONTINUED | OUTPATIENT
Start: 2020-03-20 | End: 2020-03-20

## 2020-03-20 RX ORDER — OXYCODONE HYDROCHLORIDE 5 MG/1
5 TABLET ORAL ONCE
Status: COMPLETED | OUTPATIENT
Start: 2020-03-20 | End: 2020-03-20

## 2020-03-20 RX ADMIN — OXYCODONE HYDROCHLORIDE 5 MG: 5 TABLET ORAL at 21:56

## 2020-03-20 RX ADMIN — FENTANYL CITRATE 50 MCG: 50 INJECTION, SOLUTION INTRAMUSCULAR; INTRAVENOUS at 19:02

## 2020-03-20 RX ADMIN — ONDANSETRON 4 MG: 2 INJECTION INTRAMUSCULAR; INTRAVENOUS at 19:02

## 2020-03-20 ASSESSMENT — ENCOUNTER SYMPTOMS
VOMITING: 0
NAUSEA: 0
PHOTOPHOBIA: 0
WHEEZING: 0
SHORTNESS OF BREATH: 0
ABDOMINAL PAIN: 0
DIARRHEA: 0

## 2020-03-20 ASSESSMENT — PAIN DESCRIPTION - LOCATION: LOCATION: HIP

## 2020-03-20 ASSESSMENT — PAIN DESCRIPTION - ORIENTATION: ORIENTATION: RIGHT

## 2020-03-20 ASSESSMENT — PAIN SCALES - GENERAL
PAINLEVEL_OUTOF10: 8
PAINLEVEL_OUTOF10: 7
PAINLEVEL_OUTOF10: 8

## 2020-03-20 NOTE — ED PROVIDER NOTES
[facility] [facility]     Emergency Department     Faculty Attestation    I performed a history and physical examination of the patient and discussed management with the resident. I reviewed the residents note and agree with the documented findings and plan of care. Any areas of disagreement are noted on the chart. I was personally present for the key portions of any procedures. I have documented in the chart those procedures where I was not present during the key portions. I have reviewed the emergency nurses triage note. I agree with the chief complaint, past medical history, past surgical history, allergies, medications, social and family history as documented unless otherwise noted below. For Physician Assistant/ Nurse Practitioner cases/documentation I have personally evaluated this patient and have completed at least one if not all key elements of the E/M (history, physical exam, and MDM). Additional findings are as noted. Patient here after trip and fall going up the stairs at home tripped over the last step and fell hitting his head left elbow and knee. No loss of consciousness. No chest pain shortness of breath prior to the episode no back or abdominal pain either. Is on Brilinta no other anticoagulants. On exam nontoxic well-appearing placed in c-collar. Abrasions to the elbows and knees no lacerations to repair. Lungs clear heart normal abdomen soft nontender no pulsatile mass. Good pulses all extremities. Will image, labs and EKG given age, reevaluate    EKG interpretation sinus rhythm 70. Normal intervals. Normal axis. There is no acute ST elevation, there is mild diffuse ST depression throughout, there is new inferior ST downsloping from 3/14/2020.     Critical Care     none    Aline Winn MD, Kisha Calixto  Attending Emergency  Physician             Aline Winn MD  03/20/20 7260

## 2020-03-20 NOTE — ED PROVIDER NOTES
includes Colonoscopy (11/02/2012); shoulder surgery (Right); pr colsc flx w/rmvl of tumor polyp lesion snare tq (N/A, 9/19/2017); and Colonoscopy (09/19/2017). Social History     Socioeconomic History    Marital status: Single     Spouse name: Not on file    Number of children: Not on file    Years of education: Not on file    Highest education level: Not on file   Occupational History    Not on file   Social Needs    Financial resource strain: Not on file    Food insecurity     Worry: Not on file     Inability: Not on file    Transportation needs     Medical: Not on file     Non-medical: Not on file   Tobacco Use    Smoking status: Former Smoker    Smokeless tobacco: Never Used   Substance and Sexual Activity    Alcohol use: No    Drug use: No    Sexual activity: Not on file   Lifestyle    Physical activity     Days per week: Not on file     Minutes per session: Not on file    Stress: Not on file   Relationships    Social connections     Talks on phone: Not on file     Gets together: Not on file     Attends Yarsani service: Not on file     Active member of club or organization: Not on file     Attends meetings of clubs or organizations: Not on file     Relationship status: Not on file    Intimate partner violence     Fear of current or ex partner: Not on file     Emotionally abused: Not on file     Physically abused: Not on file     Forced sexual activity: Not on file   Other Topics Concern    Not on file   Social History Narrative    Not on file       History reviewed. No pertinent family history. Allergies:  Dye [iodides] and Aspirin    Home Medications:  Prior to Admission medications    Medication Sig Start Date End Date Taking?  Authorizing Provider   ibuprofen (IBU) 400 MG tablet Take 1 tablet by mouth every 6 hours as needed for Pain 3/14/20  Yes Mai Kat, DO   lidocaine 4 % external patch Place 1 patch onto the skin daily 3/14/20 4/13/20 Yes Claudeen Calkins L Cullison, DO carbidopa-levodopa (SINEMET)  MG per tablet  1/7/20  Yes Historical Provider, MD   atorvastatin (LIPITOR) 40 MG tablet Take 1 tablet by mouth daily 1/16/20  Yes JW Harrison CNP   ticagrelor (BRILINTA) 90 MG TABS tablet Take 1 tablet by mouth 2 times daily 1/16/20  Yes JW Harrison CNP   nitroGLYCERIN (NITROSTAT) 0.4 MG SL tablet Place 1 tablet under the tongue every 5 minutes as needed for Chest pain If no relief of chest pain after 3rd dose, go to the ER or call 911 4/2/19  Yes JW Harrison CNP   butalbital-acetaminophen-caffeine (FIORICET, ESGIC) -40 MG per tablet Take 1 tablet by mouth every 6 hours as needed for Headaches   Yes Historical Provider, MD   acetaminophen (TYLENOL) 325 MG tablet Take 1 tablet by mouth every 6 hours as needed for Pain 3/14/20   Tommy Kat DO   [facility] ([facility]) CAPS Take by mouth    Historical Provider, MD   esomeprazole Magnesium (NEXIUM) 20 MG PACK Take 20 mg by mouth daily    Historical Provider, MD   metoprolol tartrate (LOPRESSOR) 25 MG tablet Take 0.5 tablets by mouth 2 times daily 1/16/20   JW Harrison CNP   clotrimazole-betamethasone (LOTRISONE) 1-0.05 % cream Apply topically 2 times daily. Patient not taking: Reported on 1/16/2020 4/2/19   JW Harrison CNP   doxepin (SINEQUAN) 100 MG capsule Take 100 mg by mouth nightly. Historical Provider, MD       REVIEW OF SYSTEMS    (2-9 systems for level 4, 10 or more for level 5)      Review of Systems   Constitutional: Negative for fever. HENT:        No facial pain or intraoral pain   Eyes: Negative for photophobia and visual disturbance. Respiratory: Negative for shortness of breath and wheezing. Cardiovascular: Positive for chest pain. Negative for palpitations and leg swelling. Gastrointestinal: Negative for abdominal pain, diarrhea, nausea and vomiting. Genitourinary: Negative for dysuria. Musculoskeletal: Positive for neck pain. Left knee pain, left elbow pain, right hip pain   Skin:        Abrasion to left knee and left elbow. Multiple small abrasions on patient's feet and toes   Neurological: Positive for headaches. Negative for dizziness, weakness and numbness. PHYSICAL EXAM   (up to 7 for level 4, 8 or more for level 5)      INITIAL VITALS:   BP (!) 160/73   Pulse 69   Temp 97.5 °F (36.4 °C) (Oral)   Resp 18   Ht 5' 7\" (1.702 m)   Wt 180 lb (81.6 kg)   SpO2 98%   BMI 28.19 kg/m²     Physical Exam  Vitals signs reviewed. Constitutional:       Appearance: He is well-developed. HENT:      Head: Normocephalic. Comments: Left frontal hematoma. No raccoon eyes or liu sign. Right Ear: Tympanic membrane, ear canal and external ear normal.      Left Ear: Tympanic membrane, ear canal and external ear normal.      Nose: Nose normal. No rhinorrhea. Comments: No septal hematoma     Mouth/Throat:      Mouth: Mucous membranes are moist.      Pharynx: No oropharyngeal exudate or posterior oropharyngeal erythema. Comments: No malocclusion  Eyes:      Extraocular Movements: Extraocular movements intact. Conjunctiva/sclera: Conjunctivae normal.      Pupils: Pupils are equal, round, and reactive to light. Neck:      Musculoskeletal: Normal range of motion. No neck rigidity. Trachea: No tracheal deviation. Comments: Mild midline tenderness  Cardiovascular:      Rate and Rhythm: Normal rate and regular rhythm. Heart sounds: Normal heart sounds. No murmur. Pulmonary:      Effort: Pulmonary effort is normal. No respiratory distress. Breath sounds: Normal breath sounds. No stridor. No wheezing, rhonchi or rales. Abdominal:      General: Bowel sounds are normal. There is no distension. Palpations: Abdomen is soft. Tenderness: There is no abdominal tenderness. Musculoskeletal:      Comments: Mild cervical midline tenderness.   No MG per tablet 1 tablet    sodium chloride flush 0.9 % injection 10 mL    sodium chloride flush 0.9 % injection 10 mL    DISCONTD: acetaminophen (TYLENOL) tablet 650 mg    oxyCODONE (ROXICODONE) immediate release tablet 5 mg    oxyCODONE (ROXICODONE) immediate release tablet 5 mg       DIAGNOSTIC RESULTS / EMERGENCY DEPARTMENT COURSE / MDM     Results for orders placed or performed during the hospital encounter of 90/96/04   Basic Metabolic Panel w/ Reflex to MG   Result Value Ref Range    Glucose 166 (H) 70 - 99 mg/dL    BUN 17 8 - 23 mg/dL    CREATININE 1.37 (H) 0.70 - 1.20 mg/dL    Bun/Cre Ratio NOT REPORTED 9 - 20    Calcium 9.1 8.6 - 10.4 mg/dL    Sodium 135 135 - 144 mmol/L    Potassium 4.4 3.7 - 5.3 mmol/L    Chloride 99 98 - 107 mmol/L    CO2 23 20 - 31 mmol/L    Anion Gap 13 9 - 17 mmol/L    GFR Non-African American 50 (L) >60 mL/min    GFR African American >60 >60 mL/min    GFR Comment          GFR Staging NOT REPORTED    CBC Auto Differential   Result Value Ref Range    WBC 9.8 3.5 - 11.3 k/uL    RBC 4.61 4.21 - 5.77 m/uL    Hemoglobin 11.3 (L) 13.0 - 17.0 g/dL    Hematocrit 37.6 (L) 40.7 - 50.3 %    MCV 81.6 (L) 82.6 - 102.9 fL    MCH 24.5 (L) 25.2 - 33.5 pg    MCHC 30.1 28.4 - 34.8 g/dL    RDW 18.8 (H) 11.8 - 14.4 %    Platelets 470 320 - 693 k/uL    MPV 8.7 8.1 - 13.5 fL    NRBC Automated 0.0 0.0 per 100 WBC    Differential Type NOT REPORTED     WBC Morphology NOT REPORTED     RBC Morphology ANISOCYTOSIS PRESENT     Platelet Estimate NOT REPORTED     Seg Neutrophils 81 (H) 36 - 65 %    Lymphocytes 11 (L) 24 - 43 %    Monocytes 8 3 - 12 %    Eosinophils % 0 (L) 1 - 4 %    Basophils 0 0 - 2 %    Immature Granulocytes 1 (H) 0 %    Segs Absolute 7.92 1.50 - 8.10 k/uL    Absolute Lymph # 1.03 (L) 1.10 - 3.70 k/uL    Absolute Mono # 0.79 0.10 - 1.20 k/uL    Absolute Eos # <0.03 0.00 - 0.44 k/uL    Basophils Absolute 0.03 0.00 - 0.20 k/uL    Absolute Immature Granulocyte 0.05 0.00 - 0.30 k/uL   Troponin right hip, xray left knee, xray left elbow. Will give fentanyl for pain and zofran for nausea/heartburn. ED Course as of Mar 21 0247   Fri Mar 20, 2020   1901 Patient just got back from CT. Just given fentanyl and Zofran. No additional complaints at this time. He remains alert and oriented. [ZT]   1901 Troponin mildly elevated at 24. [ZT]   1902 Troponin from 5 to 6 days ago was 22. [ZT]   1902 CBC unremarkable. [ZT]   1902 CT head and neck are negative. [ZT]   1935 Explained results to patient. He is agreeable with plan to admit given persistent and severe right hip pain and decreased range of motion. Plan to admit to observation for PT/OT evaluation in the morning. [ZT]   1935 Second troponin pending. [ZT]   1936 BMP is unremarkable with exception of slightly elevated creatinine at 1.37 which appears to be at patient's baseline. [ZT]   2006 Second troponin negative. Trauma consult placed. Spoke with trauma resident who is evaluating patient at this time. [ZT]   2043 Spoke with trauma resident. Will obtain pelvis x-ray. If negative patient is cleared from trauma standpoint and can be discharged home with referral for home PT or can be admitted to obs for PT/OT evaluation and treatment    [ZT]      ED Course User Index  [ZT] King Cockayne, DO       Of note, there is a repeat vital documented of 66% that I do not feel accurately represents pt's saturations during course in ED. In ED symptoms controlled with fentanyl and roxicodone. Cardiac workup unremarkable. Trauma team evaluated and signed off. Pt admitted to observation unit for monitoring of pain, reassessment of injuries in the morning, PT/OT evaluation and treatment as well as to establish homecare. No family members in area to help care for him and lives by self with history of parkinsons and significant difficulty ambulating due to injuries sustained in fall.      CONSULTS:  IP CONSULT TO TRAUMA

## 2020-03-20 NOTE — ED NOTES
Pt to ED with c/o right hip pain, bilateral toe pain, left knee pain, left elbow pain. Pt fell up last step on porch, trying to get to the phone ringing and tripped up th stair. Pt reports that he is taking brilinta, the most pain he is having is in his right hip. Pt did take a few steps from wheelchair. Pt placed on full cardiac monitor upon arrival.  Dr Mica Gaspar at bedside. Pt placed in wn, NAD at this time.      Sina Cruz RN  03/20/20 6726

## 2020-03-21 ENCOUNTER — APPOINTMENT (OUTPATIENT)
Dept: GENERAL RADIOLOGY | Age: 80
End: 2020-03-21
Payer: MEDICARE

## 2020-03-21 ENCOUNTER — APPOINTMENT (OUTPATIENT)
Dept: MRI IMAGING | Age: 80
End: 2020-03-21
Payer: MEDICARE

## 2020-03-21 VITALS
RESPIRATION RATE: 18 BRPM | HEIGHT: 67 IN | SYSTOLIC BLOOD PRESSURE: 127 MMHG | TEMPERATURE: 99.2 F | OXYGEN SATURATION: 94 % | DIASTOLIC BLOOD PRESSURE: 71 MMHG | BODY MASS INDEX: 28.25 KG/M2 | WEIGHT: 180 LBS | HEART RATE: 75 BPM

## 2020-03-21 PROCEDURE — 73630 X-RAY EXAM OF FOOT: CPT

## 2020-03-21 PROCEDURE — 2580000003 HC RX 258: Performed by: STUDENT IN AN ORGANIZED HEALTH CARE EDUCATION/TRAINING PROGRAM

## 2020-03-21 PROCEDURE — G0378 HOSPITAL OBSERVATION PER HR: HCPCS

## 2020-03-21 PROCEDURE — 6370000000 HC RX 637 (ALT 250 FOR IP): Performed by: STUDENT IN AN ORGANIZED HEALTH CARE EDUCATION/TRAINING PROGRAM

## 2020-03-21 PROCEDURE — 99212 OFFICE O/P EST SF 10 MIN: CPT | Performed by: ORTHOPAEDIC SURGERY

## 2020-03-21 PROCEDURE — 73721 MRI JNT OF LWR EXTRE W/O DYE: CPT

## 2020-03-21 RX ORDER — OXYCODONE HYDROCHLORIDE 5 MG/1
5 TABLET ORAL EVERY 8 HOURS PRN
Qty: 9 TABLET | Refills: 0 | Status: SHIPPED | OUTPATIENT
Start: 2020-03-21 | End: 2020-03-24

## 2020-03-21 RX ADMIN — METOPROLOL TARTRATE 12.5 MG: 25 TABLET ORAL at 00:17

## 2020-03-21 RX ADMIN — ESOMEPRAZOLE MAGNESIUM 20 MG: 20 CAPSULE, DELAYED RELEASE ORAL at 09:08

## 2020-03-21 RX ADMIN — CARBIDOPA AND LEVODOPA 1 TABLET: 25; 100 TABLET ORAL at 20:07

## 2020-03-21 RX ADMIN — DOXEPIN HYDROCHLORIDE 100 MG: 25 CAPSULE ORAL at 20:06

## 2020-03-21 RX ADMIN — SODIUM CHLORIDE, PRESERVATIVE FREE 10 ML: 5 INJECTION INTRAVENOUS at 00:48

## 2020-03-21 RX ADMIN — TICAGRELOR 90 MG: 90 TABLET ORAL at 09:08

## 2020-03-21 RX ADMIN — CARBIDOPA AND LEVODOPA 1 TABLET: 25; 100 TABLET ORAL at 14:41

## 2020-03-21 RX ADMIN — TICAGRELOR 90 MG: 90 TABLET ORAL at 00:16

## 2020-03-21 RX ADMIN — DOXEPIN HYDROCHLORIDE 100 MG: 25 CAPSULE ORAL at 00:16

## 2020-03-21 RX ADMIN — DESMOPRESSIN ACETATE 40 MG: 0.2 TABLET ORAL at 20:07

## 2020-03-21 RX ADMIN — METOPROLOL TARTRATE 12.5 MG: 25 TABLET ORAL at 20:06

## 2020-03-21 RX ADMIN — SODIUM CHLORIDE, PRESERVATIVE FREE 10 ML: 5 INJECTION INTRAVENOUS at 09:07

## 2020-03-21 RX ADMIN — OXYCODONE HYDROCHLORIDE 5 MG: 5 TABLET ORAL at 00:17

## 2020-03-21 RX ADMIN — METOPROLOL TARTRATE 12.5 MG: 25 TABLET ORAL at 09:07

## 2020-03-21 RX ADMIN — CARBIDOPA AND LEVODOPA 1 TABLET: 25; 100 TABLET ORAL at 00:16

## 2020-03-21 RX ADMIN — CARBIDOPA AND LEVODOPA 1 TABLET: 25; 100 TABLET ORAL at 09:06

## 2020-03-21 RX ADMIN — OXYCODONE HYDROCHLORIDE 5 MG: 5 TABLET ORAL at 13:16

## 2020-03-21 RX ADMIN — TICAGRELOR 90 MG: 90 TABLET ORAL at 20:06

## 2020-03-21 RX ADMIN — ACETAMINOPHEN 325 MG: 325 TABLET ORAL at 09:09

## 2020-03-21 ASSESSMENT — PAIN DESCRIPTION - ORIENTATION: ORIENTATION: RIGHT

## 2020-03-21 ASSESSMENT — PAIN SCALES - GENERAL
PAINLEVEL_OUTOF10: 6
PAINLEVEL_OUTOF10: 1
PAINLEVEL_OUTOF10: 7
PAINLEVEL_OUTOF10: 7
PAINLEVEL_OUTOF10: 3
PAINLEVEL_OUTOF10: 7

## 2020-03-21 ASSESSMENT — PAIN DESCRIPTION - LOCATION: LOCATION: HIP

## 2020-03-21 ASSESSMENT — PAIN DESCRIPTION - PAIN TYPE: TYPE: CHRONIC PAIN

## 2020-03-21 NOTE — CONSULTS
Orthopedic Surgery Consult  (Dr. Travis Cullen)      CC/Reason for consult:  Right hip pain    HPI:      The patient is a [de-identified] y.o. male presented to [facility] yesterday after mechanical fall down stairs. Pt fell onto right and left sides and now complains mainly of right hip pain as well as left elbow, left knee pain. Pt reports pain controlled. Pt denies numbness, tingling. Pt reports hitting his head. Pt denies LOC. Pt normally ambulates with a cane at baseline. Pt is on brilinta. Initial XRs negative, however persistent right hip pain, trauma team has ordered right hip MRI to r/o occult fracture. 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 ([facility] [address] 3/18/2011    d/t being hurt on the job    CKD (chronic kidney disease) stage 2, GFR 60-89 ml/min     CKD (chronic kidney disease) stage 3, GFR 30-59 ml/min ([facility])     Diverticulosis of colon     GERD (gastroesophageal reflux disease)     Impaired renal function     MRSA (methicillin resistant staph aureus) culture positive 9/23/2015    leg    Osteoarthritis of knee     Left    Proteinuria     Skull fracture ([facility] [address] 3/18/2011    d/t 12-foot drop on the job    Temporary loss of eyesight     periodically, happened x7    [place]    [facility] [place]       Past Surgical History:    Past Surgical History:   Procedure Laterality Date    COLONOSCOPY  11/02/2012    poor prep, tubular adenoma    COLONOSCOPY  09/19/2017    diverticulosis, multiple polyps as described, spot marking done, pathology-tubular adenoma x 4    RI COLSC FLX W/RMVL OF TUMOR POLYP LESION SNARE TQ N/A 9/19/2017    COLONOSCOPY POLYPECTOMY SNARE/COLD BIOPSY with tatooing and apc transverse colon performed by Afshin Parra MD at [facility] Right     rotator cuff       Medications Prior to Admission:   Prior to Admission medications    Medication Sig Start Date End Date Taking?  Authorizing Provider   oxyCODONE (ROXICODONE) 5 MG immediate release tablet Take 1 tablet by mouth every 8 hours as needed for Pain for up to 3 days. 3/21/20 3/24/20 Yes Monalisa Paniagua,    ibuprofen (IBU) 400 MG tablet Take 1 tablet by mouth every 6 hours as needed for Pain 3/14/20  Yes Mai Kat,    lidocaine 4 % external patch Place 1 patch onto the skin daily 3/14/20 4/13/20 Yes Mai Kat, DO   carbidopa-levodopa (SINEMET)  MG per tablet  1/7/20  Yes Historical Provider, MD   atorvastatin (LIPITOR) 40 MG tablet Take 1 tablet by mouth daily 1/16/20  Yes JW Izquierdo CNP   ticagrelor (BRILINTA) 90 MG TABS tablet Take 1 tablet by mouth 2 times daily 1/16/20  Yes JW Izquierdo CNP   nitroGLYCERIN (NITROSTAT) 0.4 MG SL tablet Place 1 tablet under the tongue every 5 minutes as needed for Chest pain If no relief of chest pain after 3rd dose, go to the ER or call 911 4/2/19  Yes JW Izquierdo CNP   butalbital-acetaminophen-caffeine (FIORICET, ESGIC) -40 MG per tablet Take 1 tablet by mouth every 6 hours as needed for Headaches   Yes Historical Provider, MD   acetaminophen (TYLENOL) 325 MG tablet Take 1 tablet by mouth every 6 hours as needed for Pain 3/14/20   Cisco Kat,    [facility] ([facility]) CAPS Take by mouth    Historical Provider, MD   esomeprazole Magnesium (NEXIUM) 20 MG PACK Take 20 mg by mouth daily    Historical Provider, MD   metoprolol tartrate (LOPRESSOR) 25 MG tablet Take 0.5 tablets by mouth 2 times daily 1/16/20   JW Izquierdo CNP   clotrimazole-betamethasone (LOTRISONE) 1-0.05 % cream Apply topically 2 times daily. Patient not taking: Reported on 1/16/2020 4/2/19   JW Izquierdo CNP   doxepin (SINEQUAN) 100 MG capsule Take 100 mg by mouth nightly.       Historical Provider, MD       Allergies:    Dye [iodides] and Aspirin    Social History:   Social History     Socioeconomic History  Marital status: Single     Spouse name: None    Number of children: None    Years of education: None    Highest education level: None   Occupational History    None   Social Needs    Financial resource strain: None    Food insecurity     Worry: None     Inability: None    Transportation needs     Medical: None     Non-medical: None   Tobacco Use    Smoking status: Former Smoker    Smokeless tobacco: Never Used   Substance and Sexual Activity    Alcohol use: No    Drug use: No    Sexual activity: None   Lifestyle    Physical activity     Days per week: None     Minutes per session: None    Stress: None   Relationships    Social connections     Talks on phone: None     Gets together: None     Attends Holiness service: None     Active member of club or organization: None     Attends meetings of clubs or organizations: None     Relationship status: None    Intimate partner violence     Fear of current or ex partner: None     Emotionally abused: None     Physically abused: None     Forced sexual activity: None   Other Topics Concern    None   Social History Narrative    None       Family History:  History reviewed. No pertinent family history. REVIEW OF SYSTEMS:   General: No LOC  CV: No chest pain  Resp: no SOB  MSK: Positive for myalgias and right hip, left knee, left elbow pain. 10 point ROS negative other than stated above      PHYSICAL EXAM:  Blood pressure 109/67, pulse 60, temperature 97.3 °F (36.3 °C), temperature source Oral, resp. rate 18, height 5' 7\" (1.702 m), weight 180 lb (81.6 kg), SpO2 98 %.   Gen: alert and oriented, NAD, cooperative  Head: normocephalic atraumatic   Neck: supple  Chest: Non labored breathing. b/l clavicles without TTP, crepitus, step off, or deformity  Cardiovascular: Regular rate, no dependent edema, distal pulses 2+  Respiratory: Chest symmetric, no accessory muscle use, normal respirations  Abdomen: non distended    Pelvis: stable to anterior and lateral compression     RUE: No ecchymoses, abrasion, deformity, erythema or lacerations. Skin intact. No TTP or crepitus to shoulder, arm, elbow, forearm, wrist, or hand. Full active and passive ROM met without pain. Compartments soft and compressible. Hand is warm and perfused. Axillary/MSC/Ulnar/Median/AIN/PIN motor intact. C4-T1 SILT. Radial pulse 2+ with BCR    LUE: Superficial abrasion over olecranon. No other ecchymoses, abrasion, deformity, erythema or lacerations. No TTP or crepitus to shoulder, arm, forearm, wrist, or hand. Near full AROM elbow without pain. Compartments soft and compressible. Hand is warm and perfused. Axillary/MSC/Ulnar/Median/AIN/PIN motor intact. C4-T1 SILT. Radial pulse 2+ with BCR    RLE: No ecchymoses, abrasions, deformity, erythema or lacerations. TTP about greater trochanter mainly. Mild + log roll. Ecchymosis to distal toes. Skin intact. No TTP or crepitus to  femur, knee, tibia/fibula, ankle or foot. Compartments soft and compressible. Foot is warm and perfused. EHL/FHL/TA/GS complex motor intact. Sural, saphenous, superficial/deep peroneal, and plantar nerve distribution SILT. Dorsalis pedis/posterior tibial pulses 2+ with BCR. Knee ligaments grossly intact. LLE: Superficial abrasions to medial knee and lateral proximal lower leg with surrounding TTP. Ecchymosis noted to distal toes. No other ecchymoses, abrasions, deformity, erythema or lacerations. No TTP or crepitus to hip, femur, tibia/fibula, ankle or foot. Negative log roll. Negative Stinchfield. Compartments soft and compressible. Foot is warm and perfused. EHL/FHL/TA/GS complex motor intact. Sural, saphenous, superficial/deep peroneal, and plantar nerve distribution SILT. Dorsalis pedis/posterior tibial pulses 2+ with BCR. Knee ligaments grossly intact.       LABS:  Recent Labs     03/20/20  1820   WBC 9.8   HGB 11.3*   HCT 37.6*         K 4.4   BUN 17   CREATININE 1.37*   GLUCOSE 166*        Radiology: -Multiple imaging pelvis, right hip, left knee, left elbow demonstrates no acute fractures, dislocations, subluxations, foreign bodies, tumors.      A/P: [de-identified] y.o. male being seen after mechanical fall down stairs with following:     -Right hip pain  -Left knee contusion  -Multiple superficial abrasions    -No acute surgical intervention  -F/u right hip MRI to r/o occult fracture  -Weight bearing: NWB RLE pending imaging, WBAT LLE/LUE  -Obs primary, trauma following  -Pain control per primary  -Ice for pain/swelling  -DVT ppx: EPC, ok from ortho standpoint   -PT/OT  -Please page Ortho with any questions or concerns      Zan Larose DO   Orthopedic Surgery Resident PGY-2  [address] [place] [address], [facility]

## 2020-03-21 NOTE — CARE COORDINATION
3853 Spoke with pt and he states that he does not feel safe to go home at this time and would like to stay at a rehab facility before going home. PT given freedom of choice and states that his first choice for rehab is [facility]. [facility]adeep Mcginnis called ([phone]) and faxed referral.     1030 Received call from Pradeep Mcginnis that they can accept pt and they have a skilled room available today. Pradeep Mcginnis also states the pt can come without an inHospital PT/OT eval as they can perform their own. Dr. Franco Creed updated. Da Haro called and updated that pt had new ortho consult placed and a MRI ordered. Will call Angela with plans after MRI results.

## 2020-03-21 NOTE — PROGRESS NOTES
conjunctivae normal  ENT: external ears normal and symmetric  Neck: supple full rom  Pulmonary/Chest: clear to auscultation bilaterally- no wheezes, rales or rhonchi, normal air movement, no respiratory distress  Cardiovascular: RRR  Abdomen: soft, non-tender  Extremities: no cyanosis, clubbing or edema  Musculoskeletal: decreased rom of right LE and right hip secondary to pain  Neurologic: parkinsonian tremor, intact sensation    No intake/output data recorded. Drain/tube output:  No intake/output data recorded. LAB:  CBC:   Recent Labs     03/20/20  1820   WBC 9.8   HGB 11.3*   HCT 37.6*   MCV 81.6*        BMP:   Recent Labs     03/20/20  1820      K 4.4   CL 99   CO2 23   BUN 17   CREATININE 1.37*   GLUCOSE 166*     COAGS: No results for input(s): APTT, PROT, INR in the last 72 hours. RADIOLOGY:  CXR: no now imaging       Jeana Nelson DO  3/21/20, 7:36 AM       Trauma Attending Demetrice Cortez      I have reviewed the above TECSS note(s) and confirmed the key elements of the medical history and physical exam. I have seen and examined the pt. I have discussed the findings, established the care plan and recommendations with Resident, GCS RN, bedside nurse. I personally reviewed any images in real time to expedite the patient's care.         Leticia Rai MD  3/24/2020  9:01 PM

## 2020-03-21 NOTE — DISCHARGE INSTR - COC
eyesight H53.129    Impaired renal function N28.9    Syncope : posterior circulation stroke vs Neurocardiogenic syncope  R55    Intracranial bleed : traumatic left sub-dural hematoma ,managed conservatively in 2011 I62.9    Headache R51    CKD (chronic kidney disease) stage 3, GFR 30-59 ml/min ([facility]) N18.3    Depression F32.9    Hyperlipidemia E78.5    Microhematuria R31.29    Microalbuminuria R80.9    Essential hypertension I10    Generalized abdominal pain R10.84    Tubular adenoma of colon D12.6    Diverticulosis of colon K57.30    History of skull fracture Z87.81    NSTEMI (non-ST elevated myocardial infarction) ([facility]) I21.4    Nausea R11.0    Pure hypercholesterolemia E78.00    Prediabetes R73.03    Parkinson disease ([facility]) G20    Chronic post-traumatic headache, not intractable G44.329    Fall (on) (from) other stairs and steps, initial encounter W10. 8XXA       Isolation/Infection:   Isolation          Contact        Patient Infection Status     Infection Onset Added Last Indicated Last Indicated By Review Planned Expiration Resolved Resolved By    MRSA  09/25/15 09/25/15 Dom Jernigan RN        Leg 9/23/15          Nurse Assessment:  Last Vital Signs: /67   Pulse 60   Temp 97.3 °F (36.3 °C) (Oral)   Resp 18   Ht 5' 7\" (1.702 m)   Wt 180 lb (81.6 kg)   SpO2 98%   BMI 28.19 kg/m²     Last documented pain score (0-10 scale): Pain Level: 7  Last Weight:   Wt Readings from Last 1 Encounters:   03/20/20 180 lb (81.6 kg)     Mental Status:  oriented and alert    IV Access:  - None    Nursing Mobility/ADLs:  Walking   Assisted  Transfer  Assisted  Bathing  Assisted  Dressing  Assisted  Toileting  Assisted  Feeding  Independent  Med Admin  Assisted  Med Delivery   whole    Wound Care Documentation and Therapy:        Elimination:  Continence:   · Bowel:  Yes  · Bladder: No  Urinary Catheter: None   Colostomy/Ileostomy/Ileal Conduit: No       Date of Last BM: ***  No intake or output data in the 24 hours ending 03/21/20 0936  No intake/output data recorded. Safety Concerns: At Risk for Falls    Impairments/Disabilities:      None    Nutrition Therapy:  Current Nutrition Therapy:   - Oral Diet:  General    Routes of Feeding: Oral  Liquids: No Restrictions  Daily Fluid Restriction: no  Last Modified Barium Swallow with Video (Video Swallowing Test): not done    Treatments at the Time of Hospital Discharge:   Respiratory Treatments: ***  Oxygen Therapy:  is not on home oxygen therapy. Ventilator:    - No ventilator support    Rehab Therapies: Physical Therapy and Occupational Therapy  Weight Bearing Status/Restrictions: weight bearing as tolerated to RLE. No active hip flexion right leg. Other Medical Equipment (for information only, NOT a DME order):  cane and walker  Other Treatments: ***    Patient's personal belongings (please select all that are sent with patient):  Dentures upper and lower    RN SIGNATURE:  Electronically signed by Joyce Bolanos RN on 3/21/20 at 4:46 PM EDT    CASE MANAGEMENT/SOCIAL WORK SECTION    Inpatient Status Date:na    Readmission Risk Assessment Score:  Readmission Risk              Risk of Unplanned Readmission:        0           Discharging to Facility/ Agency   [address] [address], [address] [facility] [address]         Phone: [phone]       Fax: [phone]          / signature: Electronically signed by Nikkie Henson RN on 3/21/20 at 12:15 PM EDT    PHYSICIAN SECTION    Prognosis: Fair    Condition at Discharge: Stable    Rehab Potential (if transferring to Rehab): Fair    Recommended Labs or Other Treatments After Discharge: none    Physician Certification: I certify the above information and transfer of Cira Luu  is necessary for the continuing treatment of the diagnosis listed and that he requires [facility] for greater 30 days.      Update Admission H&P: No change in H&P    PHYSICIAN SIGNATURE:  Electronically signed by Hamp Moritz, MD on 3/21/20 at 9:36 AM EDT

## 2020-03-21 NOTE — FLOWSHEET NOTE
[facility]     Emergency/Trauma Note    PATIENT NAME: Demetrius Alan    Shift date: 3/20/20  Shift day: Friday   Shift # 2    Room # K[facility]ANDR[facility]/KASSANDRA   Name: Demetrius Alan            Age: [de-identified] y.o. Gender: male          Evangelical: [address] 32 [employer]:     Trauma/Incident type: Adult Trauma Consult  Admit Date & Time: 3/20/2020  5:43 PM    PATIENT/EVENT DESCRIPTION:  Demetrius Alan is a [de-identified] y.o. male who arrived as a trauma consult after tripping up the stairs at home. Pt to be admitted to K[facility]AN[facility]/K[facility]ANDR[facility]. SPIRITUAL ASSESSMENT/INTERVENTION:     03/20/20 5513   Encounter Summary   Services provided to: Patient not available   Referral/Consult From: Multi-disciplinary team   Continue Visiting   (3/20/20)   Complexity of Encounter Low   Crisis   Type Trauma   Assessment Sleeping     Pt sleeping upon arrival to room, no family present at the time. PATIENT BELONGINGS:  With patient    ANY BELONGINGS OF SIGNIFICANT VALUE NOTED:  N/A    REGISTRATION STAFF NOTIFIED? Yes      WHAT IS YOUR SPIRITUAL CARE PLAN FOR THIS PATIENT?:   Chaplains will remain available to offer spiritual and emotional support as needed.     Electronically signed by Consuelo Worrell, on 3/20/2020 at 10:55 PM.  [facility]  [phone]

## 2020-03-21 NOTE — CONSULTS
TRAUMA HISTORY AND PHYSICAL EXAMINATION    PATIENT NAME: Izabella Lange: 1940  MEDICAL RECORD NO. [id number]   DATE: 3/20/2020  PRIMARY CARE PHYSICIAN: JW Vergara CNP  PATIENT EVALUATED AT THE REQUEST OF DRRosalina: Yahaira Anne    ACTIVATION   []Trauma Alert     [] Trauma Priority     [x]Trauma Consult. IMPRESSION:     Patient Active Problem List   Diagnosis    Temporary loss of eyesight    Impaired renal function    Syncope : posterior circulation stroke vs Neurocardiogenic syncope     Intracranial bleed : traumatic left sub-dural hematoma ,managed conservatively in 2011    Headache    CKD (chronic kidney disease) stage 3, GFR 30-59 ml/min ([facility])    Depression    Hyperlipidemia    Microhematuria    Microalbuminuria    Essential hypertension    Generalized abdominal pain    Tubular adenoma of colon    Diverticulosis of colon    History of skull fracture    NSTEMI (non-ST elevated myocardial infarction) ([facility])    Nausea    Pure hypercholesterolemia    Prediabetes    Parkinson disease ([facility])    Chronic post-traumatic headache, not intractable       MEDICAL DECISION MAKING AND PLAN:   Fall down stairs +loc, GOAT score 100. Patient would benefit from physical therapy as he is having decreased ROM of hip.  If patient can get home pt referral and pelvic xray negative for acute injuries okay to dc home otherwise admit for observation and PT/OT eval.        Karina Landon [address]    [] Neurosurgery     [] Orthopedic Surgery    [] Cardiothoracic     [] Facial Trauma    [] Plastic Surgery (Burn)    [] Pediatric Surgery     [] Internal Medicine    [] Pulmonary Medicine    [] Other:        HISTORY:     Chief Complaint:  Fall down stairs    INJURY SUMMARY  Abrasion left knee    GENERAL DATA  Age [de-identified] y.o.  male   Patient information was obtained from patient/chart  History/Exam limitations: none  Patient presented to the Emergency Department bibems  Injury Date: 3/20/2020   Approximate Injury Time:   1700      Transport mode:   []Ambulance      [] Helicopter     [x]Car       [] Other  Referring Hospital: na    INJURY LOCATION, home  Type of Residence (if occurred in home setting) (e.g., apartment, mobile home, single family home): MECHANISM OF INJURY    [] Motor Vehicle Collision   Specific vehicle type involved (e.g., sedan, minivan, SUV, pickup truck):   Collision with (e.g., type of vehicle, building, barn, ditch, tree):     Type of collision  [] Single Vehicle Collision  []Multiple Vehicle Collision  [] unknown collision type    Mechanism considerations  [] Fatality in Same Vehicle      []Ejected       []Rollover          []Extricated    Internal Compartment   []                      []Passenger:      []Front Seat        []Rear Seat     Personal Restraints  [] Unrestrained   []Lap Belt Only Restrained   [] Shoulder Belt Only Restrained  [] 3 Point Restrained  [] unknown     Air Bags  [] Front Air Bag  []Side Air Bag  []Curtain Airbag [][employer] Not Deployed        Pediatric Consideration:      [] Booster Seat  []Infant Car Seat  [] Child Car Seat      [] Motorcycle Collision   Wearing Helmet     []Yes     []No    []Unknown    [] Bicycle Collision Wearing Helmet     []Yes     []No    []Unknown    [] Pedestrian Struck         [] Fall    []From Standing     []From Height  Ft     [x]Down Stairs _12__steps    [] Assault    [] Gunshot  Specify caliber / type of gun: ____________________________    [] Stabbing  Specify weapon type, size: _____________________________    [] Burn  []Flame   []Scald   []Electrical   []Chemical  []Inhalation   []House fire    [] Other ______________________________________________________    [] Other protective devices used / worn ___________________________    HISTORY:     Nate Beatty is a [de-identified] y.o. male that presented to the Emergency Department following fall down flight of stairs. LOC 10 minutes on brillinta. No neck, back pain. Pt complaining of right hip pain.  X (SINEQUAN) 100 MG capsule Take 100 mg by mouth nightly. Historical Provider, MD       ALLERGIES:   []  None    []   Information not available due to exam limitations documented above   Dye [iodides] and Aspirin    PAST MEDICAL HISTORY: []  None   []   Information not available due to exam limitations documented above    has a past medical history of 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 ([facility] [address] CKD (chronic kidney disease) stage 2, GFR 60-89 ml/min, CKD (chronic kidney disease) stage 3, GFR 30-59 ml/min (HCC), Diverticulosis of colon, GERD (gastroesophageal reflux disease), Impaired renal function, MRSA (methicillin resistant staph aureus) culture positive, Osteoarthritis of knee, Proteinuria, Skull fracture ([facility] [address] Temporary loss of eyesight, and Tubular adenoma of colon. has a past surgical history that includes Colonoscopy (11/02/2012); shoulder surgery (Right); pr colsc flx w/rmvl of tumor polyp lesion snare tq (N/A, 9/19/2017); and Colonoscopy (09/19/2017). FAMILY HISTORY   []   Information not available due to exam limitations documented above    family history is not on file. SOCIAL HISTORY  []   Information not available due to exam limitations documented above     reports that he has quit smoking. He has never used smokeless tobacco.   reports no history of alcohol use. reports no history of drug use. PERTINENT SYSTEMIC REVIEW:    []   Information not available due to exam limitations documented above    Pertinent items are noted in HPI.     PHYSICAL EXAMINATION:     GLASCOW COMA SCALE  NEUROMUSCULAR BLOCKADE PRIOR TO ARRIVAL     [x]No        []Yes      Variable  Score   Variable  Score  Eye opening [x]Spontaneous 4 Verbal  [x]Oriented  5     []To voice  3   []Confused  4    []To pain  2   []Inapp words  3    []None  1   []Incomp words 2       []None  1   Motor   [x]Obeys  6    []Localizes pain 5    []Withdraws(pain) 4    []Flexion(pain) 3  []Extension(pain) 2    []None  1     GCS Total = 15    PHYSICAL EXAMINATION    VITAL SIGNS:   Vitals:    03/20/20 1754   BP:    Pulse:    Resp:    Temp:    SpO2: 100%       General Appearance: alert and oriented to person, place and time, well developed and well- nourished, in no acute distress  Skin: warm and dry, swelling left forehead, abrasion left knee  Head: traumatic as above  Pupils equal, round, and reactive to light, extraocular eye movements intact, conjunctivae normal  ENT: external ears normal and symmetric  Neck: supple full rom  Pulmonary/Chest: clear to auscultation bilaterally- no wheezes, rales or rhonchi, normal air movement, no respiratory distress  Cardiovascular: RRR  Abdomen: soft, non-tender  Extremities: no cyanosis, clubbing or edema  Musculoskeletal: decreased rom of right LE and right hip secondary to pain  Neurologic: parkinsonian tremor, intact sensation    FOCUSED ABDOMINAL SONOGRAM FOR TRAUMA (FAST): A good  quality examination was performed by Dr. Naz Dueñas and representative images were obtained. [x] No free fluid in the abdomen   [] Free fluid in RUQ   [] Free fluid in LUQ  [] Free fluid in Pelvis  [] Pericardial fluid  [] Other:        RADIOLOGY  XR HIP RIGHT (2-3 VIEWS)   Final Result   No acute osseous abnormality of the right hip. No acute osseous abnormality of the right knee. Tricompartment   osteoarthritic changes with joint effusion. XR CHEST STANDARD (2 VW)   Final Result   No acute cardiopulmonary disease. XR KNEE LEFT (3 VIEWS)   Final Result   No acute osseous abnormality of the right hip. No acute osseous abnormality of the right knee. Tricompartment   osteoarthritic changes with joint effusion. XR ELBOW LEFT (2 VIEWS)   Final Result   No acute osseous abnormality of the left elbow. Mild dorsal soft tissue   swelling. CT HEAD WO CONTRAST   Final Result   No acute intracranial abnormality.       Small left frontal scalp hematoma         CT Cervical Spine WO Contrast

## 2020-03-21 NOTE — PROGRESS NOTES
Trauma Tertiary Survey    Admit Date: 3/20/2020  Hospital day 1    Other fall from standing height        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 ([facility] [address] 3/18/2011    d/t being hurt on the job    CKD (chronic kidney disease) stage 2, GFR 60-89 ml/min     CKD (chronic kidney disease) stage 3, GFR 30-59 ml/min ([facility])     Diverticulosis of colon     GERD (gastroesophageal reflux disease)     Impaired renal function     MRSA (methicillin resistant staph aureus) culture positive 9/23/2015    leg    Osteoarthritis of knee     Left    Proteinuria     Skull fracture ([facility] [address] 3/18/2011    d/t 12-foot drop on the job    Temporary loss of eyesight     periodically, happened x7    Tubular adenoma of colon 2012, 2017    [facility]       Scheduled Meds:   atorvastatin  40 mg Oral Nightly    doxepin  100 mg Oral Nightly    esomeprazole  20 mg Oral Daily    lidocaine  1 patch Transdermal Daily    metoprolol tartrate  12.5 mg Oral BID    ticagrelor  90 mg Oral BID    carbidopa-levodopa  1 tablet Oral TID    sodium chloride flush  10 mL Intravenous 2 times per day     Continuous Infusions:  PRN Meds:acetaminophen, sodium chloride flush, oxyCODONE    Subjective:     Pt is doing well. Still reports right hip pain. No other complaints. Imaging negative for acute fx    Objective:   No data found. No intake/output data recorded. No intake/output data recorded. Radiology:Xr Chest Standard (2 Vw)    Result Date: 3/20/2020  EXAMINATION: TWO XRAY VIEWS OF THE CHEST 3/20/2020 6:36 pm COMPARISON: 05/16/2018. HISTORY: ORDERING SYSTEM PROVIDED HISTORY: chest pain TECHNOLOGIST PROVIDED HISTORY: chest pain FINDINGS: The cardiac silhouette is borderline in size and stable. The lungs are clear. No infiltrate, pleural fluid or evidence of overt failure. No acute osseous findings. No acute cardiopulmonary disease.      Xr Pelvis (1-2 Views)    Result Date: changes with joint space narrowing and spurring. Mild progression in the medial and lateral compartment of the tibiofemoral joint. A small suprapatellar joint effusion is present. No acute osseous abnormality of the right hip. No acute osseous abnormality of the right knee. Tricompartment osteoarthritic changes with joint effusion. Xr Knee Left (3 Views)    Result Date: 3/20/2020  EXAMINATION: TWO XRAY VIEWS OF THE RIGHT HIP; THREE XRAY VIEWS OF THE LEFT KNEE 3/20/2020 6:36 pm COMPARISON: AP pelvis and right hip 03/14/2020. Left knee 03/26/2011. HISTORY: ORDERING SYSTEM PROVIDED HISTORY: fall, pain TECHNOLOGIST PROVIDED HISTORY: fall, pain FINDINGS: Right hip, two views: No acute fracture or dislocation. The hip joint is maintained with no significant arthritic changes. No focal soft tissue abnormality. Left knee, three views: No acute fracture or dislocation. Tricompartment osteoarthritic changes with joint space narrowing and spurring. Mild progression in the medial and lateral compartment of the tibiofemoral joint. A small suprapatellar joint effusion is present. No acute osseous abnormality of the right hip. No acute osseous abnormality of the right knee. Tricompartment osteoarthritic changes with joint effusion. Ct Head Wo Contrast    Result Date: 3/20/2020  EXAMINATION: CT OF THE HEAD WITHOUT CONTRAST  3/20/2020 6:19 pm TECHNIQUE: CT of the head was performed without the administration of intravenous contrast. Dose modulation, iterative reconstruction, and/or weight based adjustment of the mA/kV was utilized to reduce the radiation dose to as low as reasonably achievable. COMPARISON: MRI of the brain dated May 2, 2019 HISTORY: ORDERING SYSTEM PROVIDED HISTORY: fall, hit head, hematoma TECHNOLOGIST PROVIDED HISTORY: fall, hit head, hematoma FINDINGS: BRAIN/VENTRICLES: There is no acute intracranial hemorrhage, mass effect or midline shift. No abnormal extra-axial fluid collection. The gray-white differentiation is maintained without evidence of an acute infarct. There is no evidence of hydrocephalus. Generalized atrophy is present. ORBITS: The visualized portion of the orbits demonstrate no acute abnormality. SINUSES: The visualized paranasal sinuses and mastoid air cells demonstrate no acute abnormality. SOFT TISSUES/SKULL:  Small scalp hematoma seen within the left frontal region. No acute intracranial abnormality. Small left frontal scalp hematoma     Ct Cervical Spine Wo Contrast    Result Date: 3/20/2020  EXAMINATION: CT OF THE CERVICAL SPINE WITHOUT CONTRAST 3/20/2020 5:19 pm TECHNIQUE: CT of the cervical spine was performed without the administration of intravenous contrast. Multiplanar reformatted images are provided for review. Dose modulation, iterative reconstruction, and/or weight based adjustment of the mA/kV was utilized to reduce the radiation dose to as low as reasonably achievable. COMPARISON: CT scan dated 9 July 2013 HISTORY: ORDERING SYSTEM PROVIDED HISTORY: pain, fall TECHNOLOGIST PROVIDED HISTORY: pain, fall Reason for Exam: fall. FINDINGS: BONES/ALIGNMENT: There is no acute fracture or traumatic malalignment. DEGENERATIVE CHANGES: Degenerate changes are seen throughout the cervical spine. Mild degrees of acquired central spinal stenosis again noted at the C3-C4, C4-C5, and C6-C7 levels. Osseous fusion across the C5-6 disc level is again noted. SOFT TISSUES: There is no prevertebral soft tissue swelling. No evidence of an acute fracture, or traumatic malalignment involving the cervical spine. Ct Lumbar Spine Wo Contrast    Result Date: 3/14/2020  EXAMINATION: CT OF THE LUMBAR SPINE WITHOUT CONTRAST  3/14/2020 TECHNIQUE: CT of the lumbar spine was performed without the administration of intravenous contrast. Multiplanar reformatted images are provided for review.  Dose modulation, iterative reconstruction, and/or weight based adjustment of the mA/kV was utilized to reduce the radiation dose to as low as reasonably achievable. COMPARISON: 03/18/2011 HISTORY: ORDERING SYSTEM PROVIDED HISTORY: pain TECHNOLOGIST PROVIDED HISTORY: pain Reason for Exam: pain; pain rle FINDINGS: BONES/ALIGNMENT: There is 5 mm of retrolisthesis of L1 on L2 and 6 mm of anterolisthesis of L4 on L5 (previously 3 and 4 mm respectively) with resulting moderate central canal stenosis, AP canal estimated at 6 mm in AP dimension. .  The vertebral body heights are maintained. No osseous destructive lesion is seen. DEGENERATIVE CHANGES: Multilevel DDD and facet arthrosis. SOFT TISSUES/RETROPERITONEUM: No paraspinal mass is seen. Degenerative changes and anterolisthesis as described with resulting central canal stenosis at L4-5. Xr Hip W Pelvis Min 5 Vws Bilateral    Result Date: 3/14/2020  EXAMINATION: ONE XRAY VIEW OF THE PELVIS AND TWO XRAY VIEWS OF EACH OF THE BILATERAL HIPS 3/14/2020 1:10 am COMPARISON: None. HISTORY: ORDERING SYSTEM PROVIDED HISTORY: righ thigh pain TECHNOLOGIST PROVIDED HISTORY: righ thigh pain Reason for Exam: pt states fall in 2011,pain since,worse on rt side FINDINGS: Bilateral hip joint spaces are normal.  No significant degenerative osteoarthritis. No evidence of an acute fracture or dislocation. There are mild degenerative changes in lower lumbar spine. Sacroiliac joints are unremarkable. Pubic symphysis is symmetric. Normal pelvis and bilateral hip examination for patient age. There is no significant hip osteoarthritis.        PHYSICAL EXAM:   GCS:  4 - Opens eyes on own   6 - Follows simple motor commands  5 - Alert and oriented    Pupil size:  Left 3 mm Right 3 mm  Pupil reaction: Yes  Wiggles fingers: Left Yes Right Yes  Hand grasp:   Left normal   Right normal  Wiggles toes: Left Yes    Right Yes  Plantar flexion: Left normal  Right normal    General Appearance: alert and oriented to person, place and time, well developed and well- nourished, in no  NSTEMI (non-ST elevated myocardial infarction) ([facility]    Nausea    Pure hypercholesterolemia    Prediabetes    Parkinson disease (HCC)    Chronic post-traumatic headache, not intractable    Fall (on) (from) other stairs and steps, initial encounter   superficial abrasions       Assessment/Plan:     1. XR of hip/pelvis, L elbow, L knee, CT brain and CT-cspine, and CT L spine negative  2. PT/OT  3.  Trauma will sign off

## 2020-03-21 NOTE — H&P
[address]  CDU / OBSERVATION ENCOUNTER  RESIDENT NOTE     Pt Name: Donald Holly  MRN: [id number]  [place] 1940  Date of evaluation: 3/21/20  Patient's PCP is :  JW Hollins - CNP    CHIEF COMPLAINT       Chief Complaint   Patient presents with    Fall     hit head, no LOC. on Brilinta. tripped on top step         Trent Kevin is a [de-identified] y.o. male who presents acute mechanical fall down 12 stairs. Patient reportedly tripped over the top step at home and fell down stairs landing on his left elbow and knee. Patient also reportedly hit the left side of his forehead on the way down. Denies any loss consciousness. Reports pain in his left elbow and left knee but states that otherwise he feels well. Patient reportedly lives at home by himself in a two-story home and ambulates with a cane. He has a history of Parkinson's disease which complicates his ability to ambulate well. Patient currently taking Brilinta for history of coronary artery disease. Denies any other anticoagulants or antiplatelets. denies any dizziness, headache, numbness, tingling, weakness, loss of sensation, loss of motor function, loss of bowel or bladder control, seizure, nausea, vomiting, diarrhea.     Location/Symptom: Left elbow pain, left knee pain secondary to acute fall  Timing/Onset: Acute  Provocation: Mechanical fall, tripped  Quality: Aching left knee and left elbow pain  Radiation: None  Severity: Mild  Timing/Duration: Acute, fell last night    Modifying Factors: None    REVIEW OF SYSTEMS       General ROS - No fevers, No malaise  Psychological ROS - No Headache, No suicidal thoughts  Ophthalmic ROS - No discharge, No changes in vision  ENT ROS -  No sore throat, No rhinorrhea,   Respiratory ROS - no cough, No shortness of breath, or wheezing  Cardiovascular ROS - No chest pain or dyspnea on exertion  Gastrointestinal ROS - No abdominal pain, change in bowel habits, dye [iodides] and aspirin. FAMILY HISTORY     has no family status information on file. family history is not on file. The patient denies any pertinent family history. I have reviewed and agree with the family history entered. I have reviewed the Family History and it is not significant to the case    SOCIAL HISTORY      reports that he has quit smoking. He has never used smokeless tobacco. He reports that he does not drink alcohol or use drugs. I have reviewed and agree with all Social.    PHYSICAL EXAM     INITIAL VITALS:  height is 5' 7\" (1.702 m) and weight is 180 lb (81.6 kg). His oral temperature is 97.5 °F (36.4 °C). His blood pressure is 160/73 (abnormal) and his pulse is 69. His respiration is 18 and oxygen saturation is 98%. CONSTITUTIONAL: AOx4, no apparent distress, appears stated age   HEAD: Normocephalic, hematoma overlying left eyebrow   EYES: PERRLA, EOMI    ENT: moist mucous membranes, uvula midline   NECK: supple, symmetric   BACK: symmetric   LUNGS: clear to auscultation bilaterally   CARDIOVASCULAR: regular rate and rhythm, no murmurs, rubs or gallops   ABDOMEN: soft, non-tender, non-distended with normal active bowel sounds   NEUROLOGIC:  MAEx4, no focal sensory or motor deficits   MUSCULOSKELETAL: no clubbing, cyanosis or edema. Bruising to left elbow and left knee   SKIN: no rash or wounds       DIFFERENTIAL DIAGNOSIS/MDM:   Left elbow fracture, left knee fracture, knee dislocation, elbow dislocation, ecchymosis, subdural hematoma, epidural hematoma, concussion, acute fall    DIAGNOSTIC RESULTS       RADIOLOGY:   I directly visualized the following  images and reviewed the radiologist interpretations:    Xr Chest Standard (2 Vw)    Result Date: 3/20/2020  EXAMINATION: TWO XRAY VIEWS OF THE CHEST 3/20/2020 6:36 pm COMPARISON: 05/16/2018.  HISTORY: ORDERING SYSTEM PROVIDED HISTORY: chest pain TECHNOLOGIST PROVIDED HISTORY: chest pain FINDINGS: The cardiac silhouette is head, hematoma TECHNOLOGIST PROVIDED HISTORY: fall, hit head, hematoma FINDINGS: BRAIN/VENTRICLES: There is no acute intracranial hemorrhage, mass effect or midline shift. No abnormal extra-axial fluid collection. The gray-white differentiation is maintained without evidence of an acute infarct. There is no evidence of hydrocephalus. Generalized atrophy is present. ORBITS: The visualized portion of the orbits demonstrate no acute abnormality. SINUSES: The visualized paranasal sinuses and mastoid air cells demonstrate no acute abnormality. SOFT TISSUES/SKULL:  Small scalp hematoma seen within the left frontal region. No acute intracranial abnormality. Small left frontal scalp hematoma     Ct Cervical Spine Wo Contrast    Result Date: 3/20/2020  EXAMINATION: CT OF THE CERVICAL SPINE WITHOUT CONTRAST 3/20/2020 5:19 pm TECHNIQUE: CT of the cervical spine was performed without the administration of intravenous contrast. Multiplanar reformatted images are provided for review. Dose modulation, iterative reconstruction, and/or weight based adjustment of the mA/kV was utilized to reduce the radiation dose to as low as reasonably achievable. COMPARISON: CT scan dated 9 July 2013 HISTORY: ORDERING SYSTEM PROVIDED HISTORY: pain, fall TECHNOLOGIST PROVIDED HISTORY: pain, fall Reason for Exam: fall. FINDINGS: BONES/ALIGNMENT: There is no acute fracture or traumatic malalignment. DEGENERATIVE CHANGES: Degenerate changes are seen throughout the cervical spine. Mild degrees of acquired central spinal stenosis again noted at the C3-C4, C4-C5, and C6-C7 levels. Osseous fusion across the C5-6 disc level is again noted. SOFT TISSUES: There is no prevertebral soft tissue swelling. No evidence of an acute fracture, or traumatic malalignment involving the cervical spine.      Ct Lumbar Spine Wo Contrast    Result Date: 3/14/2020  EXAMINATION: CT OF THE LUMBAR SPINE WITHOUT CONTRAST  3/14/2020 TECHNIQUE: CT of the lumbar spine All other components within normal limits   CBC WITH AUTO DIFFERENTIAL - Abnormal; Notable for the following components:    Hemoglobin 11.3 (*)     Hematocrit 37.6 (*)     MCV 81.6 (*)     MCH 24.5 (*)     RDW 18.8 (*)     Seg Neutrophils 81 (*)     Lymphocytes 11 (*)     Eosinophils % 0 (*)     Immature Granulocytes 1 (*)     Absolute Lymph # 1.03 (*)     All other components within normal limits   TROPONIN - Abnormal; Notable for the following components:    Troponin, High Sensitivity 24 (*)     All other components within normal limits   TROPONIN       I have reviewed and interpreted all available lab results. CDU IMPRESSION / PLAN      Demetrius Alan is a [de-identified] y.o. male who presents with:    1) acute mechanical fall down 12 stairs, likely secondary to unsteady gait secondary to history of Parkinson's disease, initial encounter   1. Imaging negative for any acute traumatic injury. 2.  Trauma surgery recs appreciated, no need for acute surgical or invasive intervention at this time. No evidence of severe traumatic injury   3. Plan for PT/OT evaluation today. Patient will likely need further assistance upon discharge as he has decreased ambulation and currently lives at home by himself in a two-story house.     · IP CONSULT TO TRAUMA SURGERY  · Further workup and evaluation   · Follow up recommendations     · Continue current home meds, pain control   · Monitor vitals, labs, imaging     DISPO: pending consults and clinical improvement     CONSULTS:    IP CONSULT TO TRAUMA SURGERY    PROCEDURES:        PATIENT REFERRED TO:    Lei Koyanagi, APRN - CNP  [address] [facility]  [address] [facility],[address]  [address]            --  Larisa Lopes D.O. PGY1  Emergency Medicine Resident Physician

## 2020-03-21 NOTE — CARE COORDINATION
Case Management Initial Discharge [address],             Met with:patient to discuss discharge plans. Information verified: address, contacts, phone number, , insurance Yes  PCP: JW South CNP  Date of last visit: 1 month ago     Insurance Provider: [employer]     Discharge Planning    Living Arrangements:  Alone   Support Systems:  Friends/Neighbors    Home has 2 stories  5 stairs to climb to get into front door, 1 flight stairs to climb to reach second floor  Location of bedroom/bathroom in home upstairs but has a full bath on main also      Patient able to perform ADL's:Independent, pt has no help but needs help    Current Services (outpatient & in home) none   DME equipment: cane   DME provider: na      Potential Assistance Needed:  N/A    Patient agreeable to home care: Yes  Freedom of choice provided:  Prefers to go to rehab     Prior SNF/Rehab Placement and Facility: yes   Agreeable to SNF/Rehab: Yes, pt wants to go to [address] [place] [address] as his first choice  Freedom of choice provided: yes   Evaluation: no    Expected Discharge date:  20  Patient expects to be discharged to:  Home  Follow Up Appointment: Best Day/ Time:      Transportation provider: Self   Transportation arrangements needed for discharge: Yes, has no ride    Readmission Risk              Risk of Unplanned Readmission:        0             Does patient have a readmission risk score greater than 14?: BUDDY  If yes, follow-up appointment must be made within 7 days of discharge.      Goals of Care: Go to rehab facility        Discharge Plan: Plan to discharge to [address] [place] [address]           Electronically signed by Mark Hawthorne RN on 3/21/20 at 10:32 AM EDT

## 2020-03-22 LAB
EKG ATRIAL RATE: 70 BPM
EKG P AXIS: 54 DEGREES
EKG P-R INTERVAL: 164 MS
EKG Q-T INTERVAL: 394 MS
EKG QRS DURATION: 88 MS
EKG QTC CALCULATION (BAZETT): 425 MS
EKG R AXIS: 79 DEGREES
EKG T AXIS: 52 DEGREES
EKG VENTRICULAR RATE: 70 BPM

## 2020-03-22 NOTE — DISCHARGE SUMMARY
ROXICODONE  Take 1 tablet by mouth every 8 hours as needed for Pain for up to 3 days. CONTINUE taking these medications    acetaminophen 325 MG tablet  Commonly known as:  Tylenol  Take 1 tablet by mouth every 6 hours as needed for Pain     atorvastatin 40 MG tablet  Commonly known as:  LIPITOR  Take 1 tablet by mouth daily     butalbital-acetaminophen-caffeine -40 MG per tablet  Commonly known as:  FIORICET, ESGIC     carbidopa-levodopa  MG per tablet  Commonly known as:  SINEMET     clotrimazole-betamethasone 1-0.05 % cream  Commonly known as:  Lotrisone  Apply topically 2 times daily.      doxepin 100 MG capsule  Commonly known as:  SINEQUAN     esomeprazole Magnesium 20 MG Pack  Commonly known as:  NEXIUM     ibuprofen 400 MG tablet  Commonly known as:  IBU  Take 1 tablet by mouth every 6 hours as needed for Pain     lidocaine 4 % external patch  Place 1 patch onto the skin daily     metoprolol tartrate 25 MG tablet  Commonly known as:  LOPRESSOR  Take 0.5 tablets by mouth 2 times daily     nitroGLYCERIN 0.4 MG SL tablet  Commonly known as:  Nitrostat  Place 1 tablet under the tongue every 5 minutes as needed for Chest pain If no relief of chest pain after 3rd dose, go to the ER or call 911     Bubbles and Beyond Caps     ticagrelor 90 MG Tabs tablet  Commonly known as:  BRILINTA  Take 1 tablet by mouth 2 times daily           Where to Get Your Medications      You can get these medications from any pharmacy    Bring a paper prescription for each of these medications  · oxyCODONE 5 MG immediate release tablet         Diet:  No diet orders on file, advance as tolerated     Activity:  As tolerated    Consultants: IP CONSULT TO TRAUMA SURGERY  IP CONSULT TO ORTHOPEDIC SURGERY    Procedures:  Not indicated    Diagnostic Test:   Results for orders placed or performed during the hospital encounter of 84/99/05   Basic Metabolic Panel w/ Reflex to MG   Result Value Ref Range    Glucose 166 (H) 70 - 99 mg/dL    BUN 17 8 - 23 mg/dL    CREATININE 1.37 (H) 0.70 - 1.20 mg/dL    Bun/Cre Ratio NOT REPORTED 9 - 20    Calcium 9.1 8.6 - 10.4 mg/dL    Sodium 135 135 - 144 mmol/L    Potassium 4.4 3.7 - 5.3 mmol/L    Chloride 99 98 - 107 mmol/L    CO2 23 20 - 31 mmol/L    Anion Gap 13 9 - 17 mmol/L    GFR Non-African American 50 (L) >60 mL/min    GFR African American >60 >60 mL/min    GFR Comment          GFR Staging NOT REPORTED    CBC Auto Differential   Result Value Ref Range    WBC 9.8 3.5 - 11.3 k/uL    RBC 4.61 4.21 - 5.77 m/uL    Hemoglobin 11.3 (L) 13.0 - 17.0 g/dL    Hematocrit 37.6 (L) 40.7 - 50.3 %    MCV 81.6 (L) 82.6 - 102.9 fL    MCH 24.5 (L) 25.2 - 33.5 pg    MCHC 30.1 28.4 - 34.8 g/dL    RDW 18.8 (H) 11.8 - 14.4 %    Platelets 821 904 - 629 k/uL    MPV 8.7 8.1 - 13.5 fL    NRBC Automated 0.0 0.0 per 100 WBC    Differential Type NOT REPORTED     WBC Morphology NOT REPORTED     RBC Morphology ANISOCYTOSIS PRESENT     Platelet Estimate NOT REPORTED     Seg Neutrophils 81 (H) 36 - 65 %    Lymphocytes 11 (L) 24 - 43 %    Monocytes 8 3 - 12 %    Eosinophils % 0 (L) 1 - 4 %    Basophils 0 0 - 2 %    Immature Granulocytes 1 (H) 0 %    Segs Absolute 7.92 1.50 - 8.10 k/uL    Absolute Lymph # 1.03 (L) 1.10 - 3.70 k/uL    Absolute Mono # 0.79 0.10 - 1.20 k/uL    Absolute Eos # <0.03 0.00 - 0.44 k/uL    Basophils Absolute 0.03 0.00 - 0.20 k/uL    Absolute Immature Granulocyte 0.05 0.00 - 0.30 k/uL   Troponin   Result Value Ref Range    Troponin, High Sensitivity 24 (H) 0 - 22 ng/L    Troponin T NOT REPORTED <0.03 ng/mL    Troponin Interp NOT REPORTED    Troponin   Result Value Ref Range    Troponin, High Sensitivity 22 0 - 22 ng/L    Troponin T NOT REPORTED <0.03 ng/mL    Troponin Interp NOT REPORTED    EKG 12 Lead   Result Value Ref Range    Ventricular Rate 70 BPM    Atrial Rate 70 BPM    P-R Interval 164 ms    QRS Duration 88 ms    Q-T Interval 394 ms    QTc Calculation (Bazett) 425 ms    P Axis 54 degrees    R Axis 79 3/20/2020  EXAMINATION: TWO XRAY VIEWS OF THE RIGHT HIP; THREE XRAY VIEWS OF THE LEFT KNEE 3/20/2020 6:36 pm COMPARISON: AP pelvis and right hip 03/14/2020. Left knee 03/26/2011. HISTORY: ORDERING SYSTEM PROVIDED HISTORY: fall, pain TECHNOLOGIST PROVIDED HISTORY: fall, pain FINDINGS: Right hip, two views: No acute fracture or dislocation. The hip joint is maintained with no significant arthritic changes. No focal soft tissue abnormality. Left knee, three views: No acute fracture or dislocation. Tricompartment osteoarthritic changes with joint space narrowing and spurring. Mild progression in the medial and lateral compartment of the tibiofemoral joint. A small suprapatellar joint effusion is present. No acute osseous abnormality of the right hip. No acute osseous abnormality of the right knee. Tricompartment osteoarthritic changes with joint effusion. Xr Knee Left (3 Views)    Result Date: 3/20/2020  EXAMINATION: TWO XRAY VIEWS OF THE RIGHT HIP; THREE XRAY VIEWS OF THE LEFT KNEE 3/20/2020 6:36 pm COMPARISON: AP pelvis and right hip 03/14/2020. Left knee 03/26/2011. HISTORY: ORDERING SYSTEM PROVIDED HISTORY: fall, pain TECHNOLOGIST PROVIDED HISTORY: fall, pain FINDINGS: Right hip, two views: No acute fracture or dislocation. The hip joint is maintained with no significant arthritic changes. No focal soft tissue abnormality. Left knee, three views: No acute fracture or dislocation. Tricompartment osteoarthritic changes with joint space narrowing and spurring. Mild progression in the medial and lateral compartment of the tibiofemoral joint. A small suprapatellar joint effusion is present. No acute osseous abnormality of the right hip. No acute osseous abnormality of the right knee. Tricompartment osteoarthritic changes with joint effusion. Xr Foot Left (min 3 Views)    Result Date: 3/21/2020  EXAMINATION: THREE XRAY VIEWS OF THE LEFT FOOT 3/21/2020 3:17 pm COMPARISON: None.  HISTORY: ORDERING SYSTEM PROVIDED HISTORY: Trauma/Fracture TECHNOLOGIST PROVIDED HISTORY: Trauma/Fracture Initial exam. FINDINGS: Lisfranc articulation is preserved. Moderate arthritic changes 1st MTP joint. Oblique nondisplaced intra-articular fracture base of the proximal 2nd toe at the level of the MTP joint. No marginal or central type erosions. Near nondisplaced intra-articular fracture base of the proximal 2nd toe. Xr Foot Right (min 3 Views)    Result Date: 3/21/2020  EXAMINATION: THREE XRAY VIEWS OF THE RIGHT FOOT 3/21/2020 3:17 pm COMPARISON: None. HISTORY: ORDERING SYSTEM PROVIDED HISTORY: Trauma/Fracture TECHNOLOGIST PROVIDED HISTORY: Trauma/Fracture Initial encounter. FINDINGS: Normal midfoot alignment is maintained. Severe 1st metatarsophalangeal degenerative changes. Acute fracture of the 2nd proximal phalangeal neck with mild dorsal displacement of the distal fragment as seen on the lateral view. No other fracture identified. No dislocation. The soft tissues are unremarkable. Acute mildly displaced fracture of the 2nd proximal phalangeal neck. Ct Head Wo Contrast    Result Date: 3/20/2020  EXAMINATION: CT OF THE HEAD WITHOUT CONTRAST  3/20/2020 6:19 pm TECHNIQUE: CT of the head was performed without the administration of intravenous contrast. Dose modulation, iterative reconstruction, and/or weight based adjustment of the mA/kV was utilized to reduce the radiation dose to as low as reasonably achievable. COMPARISON: MRI of the brain dated May 2, 2019 HISTORY: ORDERING SYSTEM PROVIDED HISTORY: fall, hit head, hematoma TECHNOLOGIST PROVIDED HISTORY: fall, hit head, hematoma FINDINGS: BRAIN/VENTRICLES: There is no acute intracranial hemorrhage, mass effect or midline shift. No abnormal extra-axial fluid collection. The gray-white differentiation is maintained without evidence of an acute infarct. There is no evidence of hydrocephalus. Generalized atrophy is present.  ORBITS: The

## 2020-04-09 ENCOUNTER — TELEPHONE (OUTPATIENT)
Dept: PHARMACY | Facility: CLINIC | Age: 80
End: 2020-04-09

## 2020-04-09 NOTE — LETTER
[address]  [address], [place] [address]  Phone: [phone], option 7  Fax: [address] [facility] [address] [place], [place] [id number]           04/10/20     Dear Bell Torres,    We tried to reach you recently regarding your atorvastatin, but were unable to reach you on the telephone. It appears that this medication has not been filled at proper times. We are worried you might be missing doses or not taking it as directed. It is important that you take your medications regularly and try not to miss a single dose. We have on file that you are currently taking atorvastatin 40 mg once daily. If you are no longer taking it or taking it differently than above, please call us at the number below so that we can discuss this and update your medication profile.     Some ways to help you remember to take and refill your medications are to:  · Use a pill box, set an alarm, and/or keep your medication near something that you do every day  · Fill a 3-month supply of your prescription at a time to save you time and trips to the pharmacy  if you would like assistance in switching your prescriptions to a 3-month supply, please contact us  · Ask your pharmacy if they participate in [facility]", a program where you can  all of your medications on the same day each month  · Ask your pharmacy if you can be set up with automatic refill, where they will automatically refill your prescription when it is due and let you know it's ready to     Sincerely,     Chaya Stovall, PharmD  [address]  Phone: [phone], option 7

## 2020-04-20 ENCOUNTER — HOSPITAL ENCOUNTER (OUTPATIENT)
Age: 80
Setting detail: SPECIMEN
Discharge: HOME | End: 2020-04-20
Payer: MEDICARE

## 2020-04-20 LAB
ABSOLUTE EOS #: 0.24 K/UL (ref 0–0.44)
ABSOLUTE IMMATURE GRANULOCYTE: 0.03 K/UL (ref 0–0.3)
ABSOLUTE LYMPH #: 1.37 K/UL (ref 1.1–3.7)
ABSOLUTE MONO #: 0.41 K/UL (ref 0.1–1.2)
ANION GAP SERPL CALCULATED.3IONS-SCNC: 14 MMOL/L (ref 9–17)
BASOPHILS # BLD: 1 % (ref 0–2)
BASOPHILS ABSOLUTE: 0.05 K/UL (ref 0–0.2)
BUN BLDV-MCNC: 16 MG/DL (ref 8–23)
BUN/CREAT BLD: 12 (ref 9–20)
C-REACTIVE PROTEIN: 0.7 MG/L (ref 0–5)
CALCIUM SERPL-MCNC: 9.2 MG/DL (ref 8.6–10.4)
CHLORIDE BLD-SCNC: 99 MMOL/L (ref 98–107)
CO2: 23 MMOL/L (ref 20–31)
CREAT SERPL-MCNC: 1.36 MG/DL (ref 0.7–1.2)
DIFFERENTIAL TYPE: ABNORMAL
EOSINOPHILS RELATIVE PERCENT: 3 % (ref 1–4)
FERRITIN: 27 UG/L (ref 30–400)
GFR AFRICAN AMERICAN: >60 ML/MIN
GFR NON-AFRICAN AMERICAN: 50 ML/MIN
GFR SERPL CREATININE-BSD FRML MDRD: ABNORMAL ML/MIN/{1.73_M2}
GFR SERPL CREATININE-BSD FRML MDRD: ABNORMAL ML/MIN/{1.73_M2}
GLUCOSE BLD-MCNC: 164 MG/DL (ref 70–99)
HCT VFR BLD CALC: 39.8 % (ref 40.7–50.3)
HEMOGLOBIN: 12.3 G/DL (ref 13–17)
IMMATURE GRANULOCYTES: 0 %
LYMPHOCYTES # BLD: 19 % (ref 24–43)
MCH RBC QN AUTO: 26.2 PG (ref 25.2–33.5)
MCHC RBC AUTO-ENTMCNC: 30.9 G/DL (ref 28.4–34.8)
MCV RBC AUTO: 84.7 FL (ref 82.6–102.9)
MONOCYTES # BLD: 6 % (ref 3–12)
NRBC AUTOMATED: 0 PER 100 WBC
PDW BLD-RTO: 19.4 % (ref 11.8–14.4)
PLATELET # BLD: 205 K/UL (ref 138–453)
PLATELET ESTIMATE: ABNORMAL
PMV BLD AUTO: 9.2 FL (ref 8.1–13.5)
POTASSIUM SERPL-SCNC: 4.2 MMOL/L (ref 3.7–5.3)
PROCALCITONIN: 0.04 NG/ML
RBC # BLD: 4.7 M/UL (ref 4.21–5.77)
RBC # BLD: ABNORMAL 10*6/UL
SEDIMENTATION RATE, ERYTHROCYTE: 18 MM (ref 0–15)
SEG NEUTROPHILS: 71 % (ref 36–65)
SEGMENTED NEUTROPHILS ABSOLUTE COUNT: 5.18 K/UL (ref 1.5–8.1)
SODIUM BLD-SCNC: 136 MMOL/L (ref 135–144)
WBC # BLD: 7.3 K/UL (ref 3.5–11.3)
WBC # BLD: ABNORMAL 10*3/UL

## 2020-04-20 PROCEDURE — 84145 PROCALCITONIN (PCT): CPT

## 2020-04-20 PROCEDURE — 86140 C-REACTIVE PROTEIN: CPT

## 2020-04-20 PROCEDURE — 36415 COLL VENOUS BLD VENIPUNCTURE: CPT

## 2020-04-20 PROCEDURE — 85651 RBC SED RATE NONAUTOMATED: CPT

## 2020-04-20 PROCEDURE — 80048 BASIC METABOLIC PNL TOTAL CA: CPT

## 2020-04-20 PROCEDURE — 82728 ASSAY OF FERRITIN: CPT

## 2020-04-20 PROCEDURE — P9603 ONE-WAY ALLOW PRORATED MILES: HCPCS

## 2020-04-20 PROCEDURE — 85025 COMPLETE CBC W/AUTO DIFF WBC: CPT

## 2020-04-23 NOTE — TELEPHONE ENCOUNTER
Spoke to male [facility]. 90 days was filled on 4/10 and is still waiting for . Due to RTS today at [address] hold any longer. Copay is $15.00 and 3 mo is $44.65 here. Left msg for patient to  90 day supply at [employer] today before [facility] transfer to [facility] pharmacy and pay less.   Provided call back info if we can assist.

## 2020-04-24 ENCOUNTER — CARE COORDINATION (OUTPATIENT)
Dept: CASE MANAGEMENT | Age: 80
End: 2020-04-24

## 2020-04-24 NOTE — CARE COORDINATION
[facility] Transitions Initial Follow Up Call    Call within 2 business days of discharge: Yes    Patient: Linnea Brown Patient : 1940   MRN: <[id number]>  Reason for Admission: Falls  Discharge Date: 3/21/20 RARS: No data recorded    Last Discharge [facility]       Complaint Diagnosis Description Type Department Provider    3/20/20 Fall Fall down stairs, initial encounter . .. ED to Hosp-Admission (Discharged) (ADMITTED) [facility] Chintan Anthony MD; Latesha Valenzuela. .. Facility: [facility]    Non-face-to-face services provided:      1st attempt to make contact for a follow up call, LVM introducing self, role, and nature of the call. Contact information provided. Will attempt again.  NEEMA KumariN RN  Care Transition Nurse [phone]        Follow Up  Future Appointments   Date Time Provider Shireen Charles   2020  1:30 PM JW Plaza - CNP [facility]   2020  1:00 PM JW Plaza - [address] Rajeev Torrez RN

## 2020-04-27 ENCOUNTER — CARE COORDINATION (OUTPATIENT)
Dept: CASE MANAGEMENT | Age: 80
End: 2020-04-27

## 2020-05-01 ENCOUNTER — TELEPHONE (OUTPATIENT)
Dept: FAMILY MEDICINE CLINIC | Age: 80
End: 2020-05-01

## 2020-05-21 ENCOUNTER — CARE COORDINATION (OUTPATIENT)
Dept: CARE COORDINATION | Age: 80
End: 2020-05-21

## 2020-05-29 ENCOUNTER — CARE COORDINATION (OUTPATIENT)
Dept: CARE COORDINATION | Age: 80
End: 2020-05-29

## 2020-05-29 NOTE — CARE COORDINATION
2nd attempt to enroll per PCP. Left detailed message for a return call to [facility].

## 2020-07-20 ENCOUNTER — HOSPITAL ENCOUNTER (OUTPATIENT)
Age: 80
Discharge: HOME | End: 2020-07-20
Payer: MEDICARE

## 2020-07-20 LAB
ABSOLUTE EOS #: 0.24 K/UL (ref 0–0.44)
ABSOLUTE IMMATURE GRANULOCYTE: <0.03 K/UL (ref 0–0.3)
ABSOLUTE LYMPH #: 1.25 K/UL (ref 1.1–3.7)
ABSOLUTE MONO #: 0.43 K/UL (ref 0.1–1.2)
ALBUMIN SERPL-MCNC: 4.7 G/DL (ref 3.5–5.2)
ALBUMIN/GLOBULIN RATIO: 1.6 (ref 1–2.5)
ALP BLD-CCNC: 90 U/L (ref 40–129)
ALT SERPL-CCNC: 15 U/L (ref 5–41)
ANION GAP SERPL CALCULATED.3IONS-SCNC: 14 MMOL/L (ref 9–17)
AST SERPL-CCNC: 20 U/L
BASOPHILS # BLD: 1 % (ref 0–2)
BASOPHILS ABSOLUTE: 0.04 K/UL (ref 0–0.2)
BILIRUB SERPL-MCNC: 0.56 MG/DL (ref 0.3–1.2)
BUN BLDV-MCNC: 13 MG/DL (ref 8–23)
BUN/CREAT BLD: ABNORMAL (ref 9–20)
CALCIUM SERPL-MCNC: 9.8 MG/DL (ref 8.6–10.4)
CHLORIDE BLD-SCNC: 103 MMOL/L (ref 98–107)
CHOLESTEROL, FASTING: 101 MG/DL
CHOLESTEROL/HDL RATIO: 2
CO2: 23 MMOL/L (ref 20–31)
CREAT SERPL-MCNC: 1.31 MG/DL (ref 0.7–1.2)
DIFFERENTIAL TYPE: ABNORMAL
EOSINOPHILS RELATIVE PERCENT: 5 % (ref 1–4)
ESTIMATED AVERAGE GLUCOSE: 137 MG/DL
GFR AFRICAN AMERICAN: >60 ML/MIN
GFR NON-AFRICAN AMERICAN: 53 ML/MIN
GFR SERPL CREATININE-BSD FRML MDRD: ABNORMAL ML/MIN/{1.73_M2}
GFR SERPL CREATININE-BSD FRML MDRD: ABNORMAL ML/MIN/{1.73_M2}
GLUCOSE BLD-MCNC: 102 MG/DL (ref 70–99)
HBA1C MFR BLD: 6.4 % (ref 4–6)
HCT VFR BLD CALC: 39.1 % (ref 40.7–50.3)
HDLC SERPL-MCNC: 51 MG/DL
HEMOGLOBIN: 12.4 G/DL (ref 13–17)
IMMATURE GRANULOCYTES: 0 %
LDL CHOLESTEROL: 36 MG/DL (ref 0–130)
LYMPHOCYTES # BLD: 24 % (ref 24–43)
MCH RBC QN AUTO: 27.6 PG (ref 25.2–33.5)
MCHC RBC AUTO-ENTMCNC: 31.7 G/DL (ref 28.4–34.8)
MCV RBC AUTO: 86.9 FL (ref 82.6–102.9)
MONOCYTES # BLD: 8 % (ref 3–12)
NRBC AUTOMATED: 0 PER 100 WBC
PDW BLD-RTO: 16.3 % (ref 11.8–14.4)
PLATELET # BLD: 237 K/UL (ref 138–453)
PLATELET ESTIMATE: ABNORMAL
PMV BLD AUTO: 9.4 FL (ref 8.1–13.5)
POTASSIUM SERPL-SCNC: 4.5 MMOL/L (ref 3.7–5.3)
PROSTATE SPECIFIC ANTIGEN: 1.57 UG/L
RBC # BLD: 4.5 M/UL (ref 4.21–5.77)
RBC # BLD: ABNORMAL 10*6/UL
SEG NEUTROPHILS: 62 % (ref 36–65)
SEGMENTED NEUTROPHILS ABSOLUTE COUNT: 3.28 K/UL (ref 1.5–8.1)
SODIUM BLD-SCNC: 140 MMOL/L (ref 135–144)
THYROXINE, FREE: 1.12 NG/DL (ref 0.93–1.7)
TOTAL PROTEIN: 7.6 G/DL (ref 6.4–8.3)
TRIGLYCERIDE, FASTING: 70 MG/DL
TSH SERPL DL<=0.05 MIU/L-ACNC: 3.39 MIU/L (ref 0.3–5)
VLDLC SERPL CALC-MCNC: NORMAL MG/DL (ref 1–30)
WBC # BLD: 5.3 K/UL (ref 3.5–11.3)
WBC # BLD: ABNORMAL 10*3/UL

## 2020-07-20 PROCEDURE — 80053 COMPREHEN METABOLIC PANEL: CPT

## 2020-07-20 PROCEDURE — 80061 LIPID PANEL: CPT

## 2020-07-20 PROCEDURE — 84460 ALANINE AMINO (ALT) (SGPT): CPT

## 2020-07-20 PROCEDURE — 84439 ASSAY OF FREE THYROXINE: CPT

## 2020-07-20 PROCEDURE — 84443 ASSAY THYROID STIM HORMONE: CPT

## 2020-07-20 PROCEDURE — 84450 TRANSFERASE (AST) (SGOT): CPT

## 2020-07-20 PROCEDURE — 85025 COMPLETE CBC W/AUTO DIFF WBC: CPT

## 2020-07-20 PROCEDURE — 36415 COLL VENOUS BLD VENIPUNCTURE: CPT

## 2020-07-20 PROCEDURE — 83036 HEMOGLOBIN GLYCOSYLATED A1C: CPT

## 2020-07-20 PROCEDURE — G0103 PSA SCREENING: HCPCS

## 2020-07-21 LAB
ALT SERPL-CCNC: 15 U/L (ref 5–41)
AST SERPL-CCNC: 20 U/L
CHOLESTEROL/HDL RATIO: 2
CHOLESTEROL: 101 MG/DL
HDLC SERPL-MCNC: 51 MG/DL
LDL CHOLESTEROL: 36 MG/DL (ref 0–130)
TRIGL SERPL-MCNC: 70 MG/DL
VLDLC SERPL CALC-MCNC: NORMAL MG/DL (ref 1–30)

## 2020-07-23 ENCOUNTER — OFFICE VISIT (OUTPATIENT)
Dept: ORTHOPEDIC SURGERY | Age: 80
End: 2020-07-23
Payer: MEDICARE

## 2020-07-23 VITALS — BODY MASS INDEX: 27.94 KG/M2 | HEIGHT: 67 IN | WEIGHT: 178 LBS

## 2020-07-23 PROCEDURE — 99213 OFFICE O/P EST LOW 20 MIN: CPT | Performed by: STUDENT IN AN ORGANIZED HEALTH CARE EDUCATION/TRAINING PROGRAM

## 2020-07-23 PROCEDURE — 20610 DRAIN/INJ JOINT/BURSA W/O US: CPT | Performed by: STUDENT IN AN ORGANIZED HEALTH CARE EDUCATION/TRAINING PROGRAM

## 2020-07-23 RX ORDER — BUPIVACAINE HYDROCHLORIDE 2.5 MG/ML
2 INJECTION, SOLUTION INFILTRATION; PERINEURAL ONCE
Status: COMPLETED | OUTPATIENT
Start: 2020-07-23 | End: 2020-07-23

## 2020-07-23 RX ORDER — METHYLPREDNISOLONE ACETATE 80 MG/ML
80 INJECTION, SUSPENSION INTRA-ARTICULAR; INTRALESIONAL; INTRAMUSCULAR; SOFT TISSUE ONCE
Status: COMPLETED | OUTPATIENT
Start: 2020-07-23 | End: 2020-07-23

## 2020-07-23 RX ADMIN — BUPIVACAINE HYDROCHLORIDE 5 MG: 2.5 INJECTION, SOLUTION INFILTRATION; PERINEURAL at 11:45

## 2020-07-23 RX ADMIN — METHYLPREDNISOLONE ACETATE 80 MG: 80 INJECTION, SUSPENSION INTRA-ARTICULAR; INTRALESIONAL; INTRAMUSCULAR; SOFT TISSUE at 11:45

## 2020-07-23 NOTE — PROGRESS NOTES
[facility]  [facility] [address] [facility]  Dept: [phone]  Dept Fax: [phone]        Ambulatory Follow Up    Subjective:   Ricky Duvall is a [de-identified]y.o. year old male who presents to our office today for routine followup regarding:   his   1. Arthritis of left knee    2. Closed nondisplaced fracture of proximal phalanx of lesser toe of left foot, initial encounter    3. Closed nondisplaced fracture of proximal phalanx of lesser toe of right foot, initial encounter    4. Strain of iliopsoas muscle, right, subsequent encounter    . Chief Complaint   Patient presents with    Knee Pain     left       HPI   Patient is an 19-year-old male we have seen previously in office for left knee arthritis. Patient received a corticosteroid injection to his left knee on 6/6/2019 which he states provided him substantial relief until he fell down the stairs in March 2020. Patient sustained multiple injuries after the fall on the stairs including a right second toe proximal phalanx fracture and a left second toe proximal phalanx fracture as well as a right iliopsoas tendon tear. He also stated that his left knee pain returned after the fall. He is in the office today inquiring about obtaining another corticosteroid injection to his left knee. He is able to ambulate with assistance of his cane which is his baseline ambulatory status. He states that he wraps his knee with a loose wrapping that he had received over-the-counter and takes over-the-counter anti-inflammatory medications for his pain. He did not utilize any ice and his knee is not significantly swollen. He denies any numbness or tingling to extremities. He denies buckling or locking of the left knee. He does admit to some clicking and popping sensations of his knee. He has not done physical therapy. His other knee is fine today. He has no other complaints in the office today.   States that his feet are not providing him any symptoms at this time and that he has mild intermittent pain to the lateral aspect of his right hip but denies any groin pain. Review of Systems:   General: Negative for fever and chills. Cardiovascular: Negative for chest pain and palpitations. Musculoskeletal: Positive for joint pain (left knee) & right lateral hip pain. Neurological: Negative for numbness or tingling. 10 remaining systems reviewed and negative. I have reviewed the CC, HPI, ROS, PMH, FHX, Social History. I agree with the documentation provided by other staff, residents, and/or medical students and have reviewed their documentation prior to providing my signature indicating agreement. Objective :   General: AAOx3, NAD, appears appropriate stated age  Ortho Exam   Gait: Patient ambulates with a short step stephanie without loss of balance or deviation from midline of his trajectory. RLE: No visible deformity to the extremity. Skin is intact on visual inspection. Nontender to palpation throughout the extremity, no point tenderness around the hip region, minimal pain to manual pressure of the greater trochanteric region. Compartments are soft and compressible. EHL/FHL/TA/GS complex motor intact. Full range of motion of the right hip/knee. 4+/5 hip flexion strength. 5/5 remaining lower extremity strength. Patient able to actively lift and sustain a straight leg raise from supine position. Sural, saphenous, superificial/deep peroneal, and plantar nerve distribution SILT. Dorsalis pedis pulse 2+ with BCR. LLE: Skin intact on visual expection. Non-tender to palpation of the extremity. No pain with manual palpation around the knee. No significant effusion noted to the knee. 7 degree flexion Contracture of the knee. Knee range of motion 7-110. Compartments are soft and compressible. EHL/FHL/TA/GS complex motor intact. 5/5 muscle strength of her lower extremity.   Sural, saphenous, superificial/deep peroneal, and plantar nerve distribution SILT. Dorsalis pedis pulses 2+ with BCR. CV: no obvious JVD, no dependent edema, distal pulses 2+  Respiratory: chest rise symmetric, unlabored respirations, no audible wheezing  Skin: warm, well perfused, no obvious rashes or lesions  Psych: Patient displays understanding of exam, diagnosis, and plan. Radiology:   History:   Right second toe proximal phalanx fracture    Comparison:   3/21/2020    Findings:   3 x-ray views of the right foot demonstrate a second toe proximal phalangeal neck fracture that extends intra-articularly. Fracture lines are still noted although there is a mild evidence of callus formation. Severe arthritic changes at the first MTP joint with joint space narrowing and osteophytic formation. Impression:  Right second toe proximal phalanx fracture with concern for delayed healing    History:   Left second toe proximal phalanx fracture    Comparison:   3/21/2020    Findings:   3 x-ray views of the left foot demonstrate prior fracture of the base of the second toe proximal phalanx with evidence of callus formation. No evidence of any displacement or alteration from prior imaging. Impression:  Healing left second toe proximal phalanx fracture      Assessment:      1. Arthritis of left knee    2. Closed nondisplaced fracture of proximal phalanx of lesser toe of left foot, initial encounter    3. Closed nondisplaced fracture of proximal phalanx of lesser toe of right foot, initial encounter    4. Strain of iliopsoas muscle, right, subsequent encounter         Plan: We reviewed the patient's x-ray imaging from his injury date in the office today. We discussed with him that they showed signs of a fracture of his left second toe proximal phalanx as well as his right second toe proximal phalanx. We also discussed that on MRI finding in the hospital it showed that he had an iliopsoas tendon tear.   We also discussed his left knee findings of degenerative joint disease secondary osteoarthritis. We discussed the natural course and etiology of his injuries and since the patients sole complain is to his left knee we discussed that it is a reasonable course of treatment to continue with conservative management of his left knee. Since it has been over a year since his last injection we feel that is appropriate to administer another left knee injection. Patient was amenable to this. Given the fact that patient is asymptomatic in regards to his feet we feel that continued conservative management is appropriate at this time. His x-ray imaging does raise concern for delayed healing process of his fracture particularly his right 2nd toe but patient is asymptomatic and ambulates without issue. He agreed to conservative management of his foot injuries as he stated these do not bother him and he is not worried about his feet. Patient was agreeable to our recommendations in regards to his foot injuries. We also discussed the fact that it showed on MRI he had a iliopsoas tendon tear, and in light of the fact that he has full range of motion and can ambulate without complaints, we feel that continuing to conservatively manage this issue is a viable option. We did discuss getting the patient started in physical therapy and he was agreeable to this. Patient may follow-up on an as-needed basis. We did discuss that if the patient were to exhibit any new or altered symptoms in regards to his right hip and bilateral feet that he should call our office and schedule a visit for evaluation. Injection procedure note  The alternatives, benefits, and risks were discussed with the patient. After answering all questions to the patient's satisfaction, the patient agreed to proceed forward with injection and gave verbal consent for the procedure.     With the patient's permission, appropriate anatomic landmarks were identified and the left knee joint was prepped in a sterile fashion using alcohol and/or betadine. A 21 gauge needle was then used to inject 2cc 0.25% marcaine plain and 80mg depo medrol into the joint. The injection was advanced without resistance confirming appropriate position. The patient tolerated the procedure well and the site was dressed with a band-aid. Patient was advised to ice the area for 15-20 minutes to relieve any injection site related pain. Patient was advised to contact nurse if area becomes swollen, hot, erythematous, or painful, or to go to the emergency room after business hours. Follow up:Return if symptoms worsen or fail to improve.     Orders Placed This Encounter   Medications    methylPREDNISolone acetate (DEPO-MEDROL) injection 80 mg    bupivacaine (MARCAINE) 0.25 % injection 5 mg          Orders Placed This Encounter   Procedures    XR FOOT LEFT (MIN 3 VIEWS)     Standing Status:   Future     Number of Occurrences:   1     Standing Expiration Date:   7/23/2021    XR FOOT RIGHT (MIN 3 VIEWS)     Standing Status:   Future     Number of Occurrences:   1     Standing Expiration Date:   7/23/2021   [facility] Physical Therapy - [facility]     Referral Priority:   Routine     Referral Type:   Eval and Treat     Referral Reason:   Specialty Services Required     Requested Specialty:   Physical Therapy     Number of Visits Requested:   1  20610 - DRAIN/INJECT LARGE JOINT/BURSA       aJyce Frausto DO  Orthopedic Surgery Resident, PGY-2  [facility]

## 2020-07-30 ENCOUNTER — OFFICE VISIT (OUTPATIENT)
Dept: ORTHOPEDIC SURGERY | Age: 80
End: 2020-07-30
Payer: MEDICARE

## 2020-07-30 VITALS — HEIGHT: 68 IN | BODY MASS INDEX: 26.98 KG/M2 | WEIGHT: 178 LBS

## 2020-07-30 PROCEDURE — 99212 OFFICE O/P EST SF 10 MIN: CPT | Performed by: STUDENT IN AN ORGANIZED HEALTH CARE EDUCATION/TRAINING PROGRAM

## 2020-07-30 NOTE — PROGRESS NOTES
[facility]  [facility]  [address] [facility] [address]  Dept: [phone]  Dept Fax: [phone]        Orthopaedic Clinic Follow Up      Subjective:   Dixon Pacheco is a [de-identified]y.o. year old male who presents to the clinic today for routine followup regarding his left knee pain. There appears to have been some confusion regarding scheduling for this patient. He was prescribed physical therapy, and he is here today perhaps under the impression that this was his physical therapy appointment. Currently he has no knee pain. He does not wish to have physical therapy anymore. Instead he endorses neck, back, and right hip pain. He wishes to have all these problems addressed. I informed the patient that for us to work-up these issues he needs to schedule an appointment for these issues. We cannot work-up all these issues in a single visit. He expressed understanding of these and intends to go home and schedule an appointment for the ailment that bothers him most.  At that time we will obtain x-rays of the affected joint and discussed with him treatment. Objective :   Physical exam    left knee: Upon inspection there are no gross abnormalities. Patient is able demonstrate full range of motion. Patient has nontender to palpation throughout his knee. Assessment:   [de-identified]y.o. year old male with interval improvement of his left knee pain after injection. He now complains of no problems whatsoever. Plan:      Patient intends to reschedule with the clinic for addressing his other complaints, such as back pain hip pain and neck pain. We will defer physical therapy treatment for his knee, since he no longer desires it. He just will Follow-up as needed for knee pain for future injection. He said the last time he had a knee injection he had no issues for a year, and he is hopeful for similar results. Follow up:No follow-ups on file.     No orders of the defined types were placed in this encounter. No orders of the defined types were placed in this encounter.       Electronically signed by Burgess Suly MD on 7/30/2020 at 3:15 PM

## 2020-08-06 ENCOUNTER — OFFICE VISIT (OUTPATIENT)
Dept: ORTHOPEDIC SURGERY | Age: 80
End: 2020-08-06
Payer: MEDICARE

## 2020-08-06 VITALS — BODY MASS INDEX: 27.06 KG/M2 | WEIGHT: 178 LBS

## 2020-08-06 PROCEDURE — 99203 OFFICE O/P NEW LOW 30 MIN: CPT | Performed by: STUDENT IN AN ORGANIZED HEALTH CARE EDUCATION/TRAINING PROGRAM

## 2020-08-06 RX ORDER — METHYLPREDNISOLONE 4 MG/1
TABLET ORAL
Qty: 1 KIT | Refills: 0 | Status: SHIPPED | OUTPATIENT
Start: 2020-08-06 | End: 2020-08-12

## 2020-08-06 NOTE — PROGRESS NOTES
I performed a history and physical examination of the patient and discussed management with the resident. I reviewed the physician assistant/resident physician note and agree with the documented findings and plan of care. Any areas of disagreement are noted on the chart. I have personally evaluated this patient and have completed at least one if not all key elements of the E/M (history, physical exam, and MDM). Additional findings are as noted. I agree with the chief complaint, past medical history, past surgical history, allergies, medications, social and family history as documented unless otherwise noted below.      Electronically signed by Osborne Landau, DO on 8/6/2020 at 8:10 AM

## 2020-08-06 NOTE — PROGRESS NOTES
Osteoarthritis of knee     Left    Proteinuria     Skull fracture ([facility] [address] 3/18/2011    d/t 12-foot drop on the job    Temporary loss of eyesight     periodically, happened x7    Tubular adenoma of colon 2012, 2017    sandiitmar       Past Surgical History:    Past Surgical History:   Procedure Laterality Date    COLONOSCOPY  11/02/2012    poor prep, tubular adenoma    COLONOSCOPY  09/19/2017    diverticulosis, multiple polyps as described, spot marking done, pathology-tubular adenoma x 4    ME COLSC FLX W/RMVL OF TUMOR POLYP LESION SNARE TQ N/A 9/19/2017    COLONOSCOPY POLYPECTOMY SNARE/COLD BIOPSY with tatooing and apc transverse colon performed by Dyan Zuleta MD at [facility] Right     rotator cuff       Current Medications:   Current Outpatient Medications   Medication Sig Dispense Refill    methylPREDNISolone (MEDROL DOSEPACK) 4 MG tablet Take by mouth. 1 kit 0    clopidogrel (PLAVIX) 75 MG tablet Take 1 tablet by mouth daily 90 tablet 1    metoprolol tartrate (LOPRESSOR) 25 MG tablet 1/2 tablet by mouth twice a day for hypertension 45 tablet 1    atorvastatin (LIPITOR) 40 MG tablet Take 1 tablet by mouth daily 90 tablet 1    terbinafine (LAMISIL) 1 % cream Apply topically 2 times daily.  42 g 3    pantoprazole (PROTONIX) 40 MG tablet Take 1 tablet by mouth every morning (before breakfast) 90 tablet 1    acetaminophen (TYLENOL) 325 MG tablet Take 1 tablet by mouth every 6 hours as needed for Pain 120 tablet 0    carbidopa-levodopa (SINEMET)  MG per tablet       Misc Natural Products (PROSTATE HEALTH) CAPS Take by mouth      ticagrelor (BRILINTA) 90 MG TABS tablet Take 1 tablet by mouth 2 times daily 60 tablet 12    nitroGLYCERIN (NITROSTAT) 0.4 MG SL tablet Place 1 tablet under the tongue every 5 minutes as needed for Chest pain If no relief of chest pain after 3rd dose, go to the ER or call 911 25 tablet 0    butalbital-acetaminophen-caffeine (FIORICET, ESGIC) -40 MG per tablet Take 1 tablet by mouth every 6 hours as needed for Headaches      doxepin (SINEQUAN) 100 MG capsule Take 100 mg by mouth nightly. No current facility-administered medications for this visit. Allergies:    Dye [iodides] and Aspirin    Social History:   Social History     Socioeconomic History    Marital status: Single     Spouse name: Not on file    Number of children: Not on file    Years of education: Not on file    Highest education level: Not on file   Occupational History    Not on file   Social Needs    Financial resource strain: Not on file    Food insecurity     Worry: Not on file     Inability: Not on file    Transportation needs     Medical: Not on file     Non-medical: Not on file   Tobacco Use    Smoking status: Former Smoker    Smokeless tobacco: Never Used   Substance and Sexual Activity    Alcohol use: No    Drug use: No    Sexual activity: Not on file   Lifestyle    Physical activity     Days per week: Not on file     Minutes per session: Not on file    Stress: Not on file   Relationships    Social connections     Talks on phone: Not on file     Gets together: Not on file     Attends Yazdanism service: Not on file     Active member of club or organization: Not on file     Attends meetings of clubs or organizations: Not on file     Relationship status: Not on file    Intimate partner violence     Fear of current or ex partner: Not on file     Emotionally abused: Not on file     Physically abused: Not on file     Forced sexual activity: Not on file   Other Topics Concern    Not on file   Social History Narrative    Not on file       Family History:  No family history on file. REVIEW OF SYSTEMS:  Constitutional: Negative for fever and chills. Respiratory: Negative for cough, shortness of breath and wheezing. Cardiovascular: Negative for chest pain and palpitations.    GI: Denies any nausea, vomiting, abdominal pain   Musculoskeletal: ibuprofen today from his chart as he has not been taking it and had like closer management with his primary care physician. We will see the patient back in 6 weeks for repeat clinical exam.  Call the office immediately with any problems. Orders Placed This Encounter   Medications    methylPREDNISolone (MEDROL DOSEPACK) 4 MG tablet     Sig: Take by mouth. Dispense:  1 kit     Refill:  0       Orders Placed This Encounter   Procedures   [address] [facility] - [facility]     Referral Priority:   Routine     Referral Type:   Eval and Treat     Referral Reason:   Specialty Services Required     Requested Specialty:   Physical Therapy     Number of Visits Requested:   1            --------------------------------------------------------------  Agus Loco DO, PGY-4  [facility]) Orthopedic Surgery   11:47 AM 08/06/20      Please excuse any typos/errors, as this note was created with the assistance of voice recognition software. While intending to generate a document that actually reflects the content of the visit, the document can still have some errors including those of syntax and sound-a-like substitutions which may escape proof reading. In such instances, actual meaning can be extrapolated by context.

## 2020-08-06 NOTE — PROGRESS NOTES
I performed a history and physical examination of the patient and discussed management with the resident. I reviewed the physician assistant/resident physician note and agree with the documented findings and plan of care. Any areas of disagreement are noted on the chart. I have personally evaluated this patient and have completed at least one if not all key elements of the E/M (history, physical exam, and MDM). Additional findings are as noted. I agree with the chief complaint, past medical history, past surgical history, allergies, medications, social and family history as documented unless otherwise noted below.      Electronically signed by Simuel Holstein, DO on 8/6/2020 at 8:11 AM

## 2020-08-06 NOTE — PROGRESS NOTES
I performed a history and physical examination of the patient and discussed management with the resident. I reviewed the physician assistant/resident physician note and agree with the documented findings and plan of care. Any areas of disagreement are noted on the chart. I have personally evaluated this patient and have completed at least one if not all key elements of the E/M (history, physical exam, and MDM). Additional findings are as noted. I agree with the chief complaint, past medical history, past surgical history, allergies, medications, social and family history as documented unless otherwise noted below.      Electronically signed by Jud Servin DO on 8/6/2020 at 8:10 AM

## 2020-08-06 NOTE — PROGRESS NOTES
I performed a history and physical examination of the patient and discussed management with the resident. I reviewed the physician assistant/resident physician note and agree with the documented findings and plan of care. Any areas of disagreement are noted on the chart. I have personally evaluated this patient and have completed at least one if not all key elements of the E/M (history, physical exam, and MDM). Additional findings are as noted. I agree with the chief complaint, past medical history, past surgical history, allergies, medications, social and family history as documented unless otherwise noted below.      Electronically signed by Sreekanth Us DO on 8/6/2020 at 8:11 AM

## 2020-08-06 NOTE — PROGRESS NOTES
I performed a history and physical examination of the patient and discussed management with the resident. I reviewed the physician assistant/resident physician note and agree with the documented findings and plan of care. Any areas of disagreement are noted on the chart. I have personally evaluated this patient and have completed at least one if not all key elements of the E/M (history, physical exam, and MDM). Additional findings are as noted. I agree with the chief complaint, past medical history, past surgical history, allergies, medications, social and family history as documented unless otherwise noted below.      Electronically signed by Dario Martinez DO on 8/6/2020 at 8:11 AM

## 2020-08-18 ENCOUNTER — HOSPITAL ENCOUNTER (OUTPATIENT)
Dept: PHYSICAL THERAPY | Age: 80
Setting detail: THERAPIES SERIES
Discharge: HOME | End: 2020-08-18
Payer: MEDICARE

## 2020-08-18 PROCEDURE — 97110 THERAPEUTIC EXERCISES: CPT

## 2020-08-18 PROCEDURE — 97161 PT EVAL LOW COMPLEX 20 MIN: CPT

## 2020-08-18 NOTE — CONSULTS
[x] [facility]) [facility] &  Therapy  [address]  [phone]  F: [phone] [] [address]  [place] [address]   [address]  P: [phone]  F: [phone] [] [address] &  Therapy  [address]  P: [phone]  F: [phone] [] [address]  [facility]   [address]   [place]: [phone]  F: [phone]      Physical Therapy Spine Evaluation    Date:  2020  Patient: Mayra Henry  : 1940  MRN: [id number]  Physician: Dr. Houston Every: [facility]  Medical Diagnosis: lumbar radiculopathy, chronic    Rehab Codes: M 54.5, M 62.81, M 62.830, R 26.2  Onset Date: 20    Next 's appt.: 20    Subjective:   CC: Right sided low back pain. Neck hurts. All comes and goes. Cramps in the front of his legs. Uses a cane to walk. Sleep comes and goes. Has to rest during mowing his lawn. Has shortness of breath with walking at times. HPI: (20) Knocked off a ladder in , fell 30 feet. No fractures, just bruised and sore. About a month ago, got dizzy going down steps. He hit the door for the basement, and fell down the basement steps. Laid there for about 2 hours. Unsure why he got dizzy, and has not gotten dizzy since.     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       [] MRI:  [] Rating: (0-10 scale) 5-6/10  Pain altered Tx:  [] Yes  [x] No  Action:    Symptoms:  [] Improving [] Worsening [x] Same  Better:  [] AM    [] PM    [] Sit    [] Rise/Sit    []Stand    [] Walk    [] Lying    [] Other:  Worse: [] AM    [] PM    [] Sit    [] Rise/Sit    []Stand    [] Walk    [] Lying    [] Bend                      [] Valsalva    [x] Other:  Comes and goes; but usually higher level activity increases pain. Sleep: [] OK    [x] Disturbed, sometimes because of back. Objective:   STRENGTH  ROM    Left Right Cervical    L1-2 Hip Flex 4- 4- Flexion    Hip Abd 4- 4- Extension    L3-4 Knee Ext 4- 4- Rotation L R   L4 Ankle DF 4- 4- Sidebend L R   L5 EHL 4- 4- Retraction    S1 Plant. Flex 4- 4- Lumbar    Abdominals Fair  Flexion 85   Erector Spinae Fair  Extension 7      Rotation L WFL, tight R WFL, tight      Sidebend L 15 R 12      UE/LE        Hamstring flexibility L lack 34 Lack 27      Knee extension/flexion L lack 10 to 0 active L flexion 118 deg       R 0 ext R 123 flex   Wears a brace on the left knee. TESTS (+/-) LEFT RIGHT Not Tested   SLR [] sit [x] supine tightness tightness []   Piriformis stretch Stretch groin Stretch groin []   SKTC Pull in back Pull in back []   DKTC   [x]   LTR Stretch back/hip Stretch back/hip []   Michael Tests ? Pain ?  Pain No Change Not Tested   RFIS [x] [] [] []   CHAGO [x] [] [] []   RFIL [x] [] [] []   REIL [] [] [] [x]     OBSERVATION No Deficit Deficit Not Tested Comments   Posture       Forward Head [x] [] []    Rounded Shoulders [] [x] []    Kyphosis [x] [] []    Lordosis [x] [] []    Lateral Shift [] [] [x]    Scoliosis [x] [] []    Iliac Crest [x] [] []    PSIS [] [x] [] Tender   ASIS [x] [] []    Genu Valgus [x] [] []    Genu Varus [x] [] []    Genu Recurvatum [x] [] []    Pronation [x] [] []    Supination [x] [] []    Leg Length Discrp [x] [] []    Slumped Sitting [] [x] []    Palpation [] [x] [] Tender right > left greater trochanters, right > left ischial tuberosities, spasms throughout lumbar and thoracic paraspinals   Sensation [x] [] []    Edema [x] [] []    Neurological [x] [] []        Functional Test: Oswestry  Score: 52% functionally impaired     Comments:    Assessment:  Patient would benefit from skilled physical therapy services in order to: decrease spasms in low back, improve core strength, improve B LE strength, improve ability to walk, improve flexibility. Problems:    [x] ? Pain:  [x] ? ROM:  [x] ? Strength:  [x] ? Function:  [] Other:      STG: (to be met in 9 treatments)  1. ? Pain: Low back pain improve to 5/10 at max with activity such as mowing the lawn  2. ? ROM: Bilateral hamstring flexibility improve to lacking 28 degrees or less on the left, and 20 degrees or less on the right  3. ? Strength: B LE strength to grossly improve to 4/5  4. ? Function: Patient to report improved ease of walking without cramping in bilateral legs  5. Patient to be independent with home exercise program as demonstrated by performance with correct form without cues. 6. Demonstrate Knowledge of fall prevention  LTG: (to be met in 18 treatments)  1. Low back pain to improve to 2/10 at max with activity such as mowing the law  2. Bilateral hamstring flexibility to improve to lacking 15 degrees or less  3. Core strength to improve to good by demonstrating full bridge for 10 reps without pain, crunches 10 reps without pain and SLR for 10 reps without pain  4. Oswestry score of 35% functionally impaired or less  5. Patient to report generally overall feeling better with pains throughout his body less often. 6. Patient to report improved sleep to 5 hours consistently without waking due to back pain.         Patient goals: \"Hope I can feel better\"    Rehab Potential:  [x] Good  [x] Fair  [] Poor   Suggested Professional Referral:  [x] No  [] Yes:  Barriers to Goal Achievement:  [x] No  [] Yes:  Domestic Concerns:  [x] No  [] Yes:    Pt. Education:  [x] Plans/Goals, Risks/Benefits discussed  [x] Home exercise program   Method of Education: [x] Verbal  [x] Demo  [x] Written -- see chart below  Comprehension of Education:  [x] Verbalizes understanding. [x] Demonstrates understanding. [x] Needs Review. [] Demonstrates/verbalizes understanding of HEP/Ed previously given. Treatment Plan:  [x] Therapeutic Exercise   41592  [] Iontophoresis: 4 mg/mL Dexamethasone Sodium Phosphate  mAmin  74331   [x] Therapeutic Activity  28438 [] Vasopneumatic cold with compression  66095    [x] Gait Training   85844 [] Ultrasound   81491   [x] Neuromuscular Re-education  75451 [x] Electrical Stimulation Unattended  89343   [x] Manual Therapy  26968 [] Electrical Stimulation Attended  04187   [x] Instruction in HEP  [] Lumbar/Cervical Traction  24919   [] Aquatic Therapy   87015 [x] Cold/hotpack    [] Massage   98482      [] Dry Needling, 1 or 2 muscles  72274   [] Biofeedback, first 15 minutes   80377  [] Biofeedback, additional 15 minutes   36926 [] Dry Needling, 3 or more muscles  22606     []  Medication allergies reviewed for use of    Dexamethasone Sodium Phosphate 4mg/ml     with iontophoresis treatments. Pt is not allergic. Frequency:  2 x/week for 18 visits    Todays Treatment:  Modalities:   Precautions:  Exercises:  Exercise Reps/ Time Weight/ Level Comments   Seated lumbar flexion stretch 5 x 5 sec HEP   SLR 5 x ea  HEP   Crunches 5 x  HEP   Butterfly stretch 5 x 5 sec HEP         Other:  Felt a little sore after exercises, but no dizziness or lightheadedness. Specific Instructions for next treatment: Start with NuStep. Add standing hip abduction and extension. Squats (can be wall squats). Hamstring stretches. Bridges, SLR, LTR, Butterfly. Piriformis stretch. If tolerates, can do quadruped (prayer) stretch center and both sides. Can use modalities for pain control. Please issue fall prevention interventions.       Evaluation Complexity:  History

## 2020-08-19 NOTE — FLOWSHEET NOTE
[facility] Fall Risk Assessment    Patient Name:  Perri Harrington  : 1940        Risk Factor Scale  Score   History of Falls [x] Yes  [] No 25  0 25   Secondary Diagnosis [] Yes  [x] No 15  0 0   Ambulatory Aid [] Furniture  [x] Crutches/cane/walker  [] None/bedrest/wheelchair/nurse 30  15  0 15   IV/Heparin Lock [] Yes  [x] No 20  0 0   Gait/Transferring [] Impaired  [x] Weak  [] Normal/bedrest/immobile 20  10  0 10   Mental Status [] Forgets limitations  [x] Oriented to own ability 15  0 0      Total: 50     Based on the Assessment score: check the appropriate box.     []  No intervention needed   Low =   Score of 0-24    []  Use standard prevention interventions Moderate =  Score of 24-44   [] Give patient handout and discuss fall prevention strategies   [] Establish goal of education for patient/family RE: fall prevention strategies    [x]  Use high risk prevention interventions High = Score of 45 and higher   [x] Give patient handout and discuss fall prevention strategies   [x] Establish goal of education for patient/family Re: fall prevention strategies   [x] Discuss lifeline / other resources    Electronically signed by:   Elvis Marin PT  Date: 2020

## 2020-08-20 ENCOUNTER — HOSPITAL ENCOUNTER (OUTPATIENT)
Age: 80
Setting detail: SPECIMEN
Discharge: HOME | End: 2020-08-20
Payer: MEDICARE

## 2020-08-20 LAB
FOLATE: 12.2 NG/ML
IRON: 66 UG/DL (ref 59–158)
MAGNESIUM: 2.5 MG/DL (ref 1.6–2.6)
VITAMIN B-12: 889 PG/ML (ref 232–1245)

## 2020-08-24 ENCOUNTER — HOSPITAL ENCOUNTER (OUTPATIENT)
Dept: PHYSICAL THERAPY | Age: 80
Setting detail: THERAPIES SERIES
Discharge: HOME | End: 2020-08-24
Payer: MEDICARE

## 2020-08-24 PROCEDURE — 97110 THERAPEUTIC EXERCISES: CPT

## 2020-08-24 NOTE — FLOWSHEET NOTE
[] [facility]) - [facility]  [facility]  [address]  [phone]  F: [phone] [] [address]  [place] [address]   [address]  P: [phone]  F: [phone] [] [address] &  Therapy  [address] [facility]  P: [phone]  F: [phone] [] [address]  [facility]   [address]   [place]: [phone]  F: [phone]      Physical Therapy Daily Treatment Note    Date:  2020  Patient Name:  Kalli Baires    :  1940  MRN: [id number]   Patient: Kalli Baires                        : 1940                        MRN: [id number]  Physician: Dr. Suhas Larkin: [facility]  Medical Diagnosis: lumbar radiculopathy, chronic                Rehab Codes: M 54.5, M 62.81, M 62.830, R 26.2  Onset Date: 20               Next 's appt.: 20  Visit# / total visits: ; Progress note for Medicare patient due at visit 10     Cancels/No Shows: 0/0    Subjective:    Pain:  [x] Yes  [] No Location:   LB  Pain  Rating: (0-10 scale 5/10 waist band area of LB  Pain altered Tx:  [x] No  [] Yes  Action:  Comments: Reports LB waist line pain and states quad muscles feel real tight. Reports he has cortisone injection in Left knee last week. 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      Precautions:   Exercises:  Exercise Reps/ Time Weight/ Level Comments   Nu step  5 mins  added         Standing   add   Gastro stretch      3 way hip       Wall squats            Sitting       lumbar flexion stretch 5 x5\"   HEP   HS stretching 3x15\"  Violet, stool         Supine      LTR 10x10\"  added   SKTC 3x15\"  added   Piriformis stretch violet 3x20\"  added   Heel slides violet 10x  added   SLR 10x ea   HEP   bridging 10x     Crunches 10 x   HEP   Butterfly stretch 5 x 15 sec HEP         Prone HS curls 15x1 A Over one pillow    Prone hip flexor strech add  Add as able (HS cramps 8/24)   [facility], R/L, add as able.                                Other:        Specific Instructions for next treatment: Progress core and hip strength, decrease radicular LB/LE pain, education on body mechanics, posture and positioning. Treatment Charges: Mins Units   []  Modalities     [x]  Ther Exercise 50 3   []  Manual Therapy     []  Ther Activities     []  Aquatics     []  Vasocompression     []  Other     Total Treatment time 50 3       Assessment: [x] Progressing toward goals. Progressed flexion based LB stretching and added hip flexor/HS stretching bilaterally. Pt demonstrated fairly good understanding of stretching techniques and hip/gluteal strengthening. Knee and hip AROM limited due to overall daily routines/limited activity, OA, and decreased balance. PT with slow pacing throughout session. [] No change. [] Other:  [x] Patient would continue to benefit from skilled physical therapy services in order to: decreased LB/LE pain, increase lumbar ext and rotation ROM and strengthen hips/gluteals in order to improved ADLS and increase community walking time without LE cramping.     STG: (to be met in 9 treatments)  1. ? Pain: Low back pain improve to 5/10 at max with activity such as mowing the lawn  2. ? ROM: Bilateral hamstring flexibility improve to lacking 28 degrees or less on the left, and 20 degrees or less on the right  3. ? Strength: B LE strength to grossly improve to 4/5  4. ? Function: Patient to report improved ease of walking without cramping in bilateral legs  5. Patient to be independent with home exercise program as demonstrated by performance with correct form without cues. 6. Demonstrate Knowledge of fall prevention  LTG: (to be met in 18 treatments)  1.  Low back pain to improve to 2/10 at max with activity such as mowing the law  2. Bilateral hamstring flexibility to improve to lacking 15 degrees or less  3. Core strength to improve to good by demonstrating full bridge for 10 reps without pain, crunches 10 reps without pain and SLR for 10 reps without pain  4. Oswestry score of 35% functionally impaired or less  5. Patient to report generally overall feeling better with pains throughout his body less often. 6. Patient to report improved sleep to 5 hours consistently without waking due to back pain.         Patient goals: \"Hope I can feel better\"       Pt. Education:  [x] Yes  [] No  [x] Reviewed Prior HEP/Ed  Method of Education: [x] Verbal  [x] Demo  [x] Written  Comprehension of Education:  [x] Verbalizes understanding. [x] Demonstrates understanding. [] Needs review. [] Demonstrates/verbalizes HEP/Ed previously given. 8/24/20: fall prevention, LTR, SKTC, piriformis stretch, bridging, prone knee flexion. Plan: [x] Continue current frequency toward long and short term goals.     [x] Specific Instructions for subsequent treatments  decreased LB/LE pain, increase lumbar ext and rotation ROM and strengthen hips/gluteals in order to improved ADLS and increase community walking time without LE cramping.             Time In: 1047           Time Out: 1144    Electronically signed by:  Lavelle Lynn PTA

## 2020-08-25 ENCOUNTER — CARE COORDINATION (OUTPATIENT)
Dept: CARE COORDINATION | Age: 80
End: 2020-08-25

## 2020-08-25 SDOH — ECONOMIC STABILITY: FOOD INSECURITY: WITHIN THE PAST 12 MONTHS, YOU WORRIED THAT YOUR FOOD WOULD RUN OUT BEFORE YOU GOT MONEY TO BUY MORE.: NEVER TRUE

## 2020-08-25 SDOH — ECONOMIC STABILITY: TRANSPORTATION INSECURITY
IN THE PAST 12 MONTHS, HAS LACK OF TRANSPORTATION KEPT YOU FROM MEETINGS, WORK, OR FROM GETTING THINGS NEEDED FOR DAILY LIVING?: PATIENT DECLINED

## 2020-08-25 SDOH — ECONOMIC STABILITY: TRANSPORTATION INSECURITY
IN THE PAST 12 MONTHS, HAS THE LACK OF TRANSPORTATION KEPT YOU FROM MEDICAL APPOINTMENTS OR FROM GETTING MEDICATIONS?: PATIENT DECLINED

## 2020-08-25 SDOH — ECONOMIC STABILITY: FOOD INSECURITY: WITHIN THE PAST 12 MONTHS, THE FOOD YOU BOUGHT JUST DIDN'T LAST AND YOU DIDN'T HAVE MONEY TO GET MORE.: NEVER TRUE

## 2020-08-25 SDOH — HEALTH STABILITY: MENTAL HEALTH
STRESS IS WHEN SOMEONE FEELS TENSE, NERVOUS, ANXIOUS, OR CAN'T SLEEP AT NIGHT BECAUSE THEIR MIND IS TROUBLED. HOW STRESSED ARE YOU?: ONLY A LITTLE

## 2020-08-25 SDOH — HEALTH STABILITY: PHYSICAL HEALTH: ON AVERAGE, HOW MANY MINUTES DO YOU ENGAGE IN EXERCISE AT THIS LEVEL?: 20 MIN

## 2020-08-25 SDOH — HEALTH STABILITY: PHYSICAL HEALTH: ON AVERAGE, HOW MANY DAYS PER WEEK DO YOU ENGAGE IN MODERATE TO STRENUOUS EXERCISE (LIKE A BRISK WALK)?: 2 DAYS

## 2020-08-25 SDOH — ECONOMIC STABILITY: INCOME INSECURITY: HOW HARD IS IT FOR YOU TO PAY FOR THE VERY BASICS LIKE FOOD, HOUSING, MEDICAL CARE, AND HEATING?: NOT VERY HARD

## 2020-08-25 NOTE — CARE COORDINATION
Ambulatory Care Coordination Note  8/25/2020  CM Risk Score: 5  Charlson 10 Year Mortality Risk Score: 100%     ACC: Camelia Cm RN    Summary Note: Spoke with patient, explained care coordination. Patient is accepting of program.  ACP reviewed  Patient stated home care started, set up his medications. Per patient he has PT tomorrow, denied transportation issues. Patient agreed to a follow up call in 1 week, patient will think about any needs that he has for ACM. Precautions reviewed for COVID-19 per CDC  Wash hands often with soap and water for at least 20 seconds  When soap is not available us hand  with at least 70% alcohol  Avoid touching your face  Avoid close contact with others  Maintain 6 feet distance if possible    If you are sick:  Cover mouth and nose when coughing or sneezing and then wash hands  Wear a mask when around others  Clean and disinfect surfaces often with soap or detergent and water. Ambulatory Care Coordination Assessment    Care Coordination Protocol  Program Enrollment:  Complex Care  Referral from Primary Care Provider:  Yes  Week 1 - Initial Assessment     Do you have all of your prescriptions and are they filled?:  No  Barriers to medication adherence:  Forgets to take  Are you able to afford your medications?:  Yes  How often do you have trouble taking your medications the way you have been told to take them?:  Sometimes I take them as prescribed. Do you have Home O2 Therapy?:  No      Ability to seek help/take action for Emergent Urgent situations i.e. fire, crime, inclement weather or health crisis. :  Dependent  Ability to ambulate to restroom:  Dependent  Ability handle personal hygeine needs (bathing/dressing/grooming):  Dependent  Ability to manage Medications:  Needs Assistance  Ability to prepare Food Preparation:  Needs Assistance  Ability to maintain home (clean home, laundry):  Needs Assistance  Ability to drive and/or has transportation:  Needs Assistance  Ability to do shopping:  Needs Assistance  Ability to manage finances:  Needs Assistance  Is patient able to live independently?:  Yes     Current Housing:  Private Residence           Frequent urination at night?:  Yes  Do you use rails/bars?:  No  Do you have a non-slip tub mat?:  No     Are you experiencing loss of meaning?:  No (Comment: sometimes I feel lonely)  Are you experiencing loss of hope and peace?:  No     Are the patients physical health problems impacting on their mental well-being?:  No identified areas of concern   Are there any problems with your patients lifestyle behaviors (alcohol, drugs, diet, exercise) that are impacting on physical or mental well-being?:  No identified areas of concern   Do you have any other concerns about your patients mental well-being?  How would you rate their severity and impact on the patient?:  No identified areas of concern   How would you rate their home environment in terms of safety and stability (including domestic violence, insecure housing, neighbor harassment)?:  Consistently safe, supportive, stable, no identified problems   How do daily activities impact on the patient's well-being? (include current or anticipated unemployment, work, caregiving, access to transportation or other):  No identified problems or perceived positive benefits   How would you rate their social network (family, work, friends)?:  Restricted participation with some degree of social isolation   How would you rate their financial resources (including ability to afford all required medical care)?:  Financially secure, some resource challenges   How wells does the patient now understand their health and well-being (symptoms, signs or risk factors) and what they need to do to manage their health?:  Reasonable to good understanding and already engages in managing health or is willing to undertake better management   How well do you think your patient can engage in healthcare 8/26/2020 10:45 AM Lendel Boor, PTA [facility]   8/31/2020 10:45 AM Lendel Boor, PTA [facility]   9/2/2020 10:45 AM Lendel Boor, PTA [facility]   9/9/2020 10:45 AM Lendel Boor, PTA [facility]   9/17/2020 10:50 AM SCHEDULE, [facility] Kavin PAZ [facility]   9/21/2020 11:30 AM Jose De Jesus Gil, 1 [facility]

## 2020-08-26 ENCOUNTER — HOSPITAL ENCOUNTER (OUTPATIENT)
Dept: PHYSICAL THERAPY | Age: 80
Setting detail: THERAPIES SERIES
Discharge: HOME | End: 2020-08-26
Payer: MEDICARE

## 2020-08-26 PROCEDURE — 97110 THERAPEUTIC EXERCISES: CPT

## 2020-08-26 NOTE — FLOWSHEET NOTE
[] [facility]  [facility]  [address]  [phone]  F: [phone] [] [address]  [facility]   [address]  P: [phone]  F: [phone] [] [address] &  Therapy  [address] [facility]  P: [phone]  F: [phone] [] [address]  [facility]   [address]   [place]: [phone]  F: [phone]      Physical Therapy Daily Treatment Note    Date:  2020  Patient Name:  Mahi Garza    :  1940  MRN: [id number]   Patient: Mahi Garza                        : 1940                        MRN: [id number]  Physician: Dr. Pierce Galaviz: [facility]  Medical Diagnosis: lumbar radiculopathy, chronic                Rehab Codes: M 54.5, M 62.81, M 62.830, R 26.2  Onset Date: 20               Next 's appt.: 20  Visit# / total visits: ; Progress note for Medicare patient due at visit 10     Cancels/No Shows: 0/0    Subjective:    Pain:  [x] Yes  [] No Location:   LB  Pain  Rating: (0-10 scale 5/10 waist band area of LB  Pain altered Tx:  [x] No  [] Yes  Action:  Comments:  No complaints from prior session, reports he addressed HEP. States he is feeling better. States MD had him stop one of his parkinson medication due to side effects. Objective:  Modalities:HP  To LB x 10 mins post session in [place].    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      Precautions:   Exercises:  Exercise  LB/radicular pain Reps/ Time Weight/ Level Comments   Nu step  6 mins  L4          Standing violet LE   Added all standing    Gastro stretch 3x20\"  wedge   Hip abd w/ iso abdom 10x A    Hip ext w/ iso abdom 10x A    Wall slide squats 15x           Sitting violet LE       lumbar flexion cues.  6. Demonstrate Knowledge of fall prevention  LTG: (to be met in 18 treatments)  1. Low back pain to improve to 2/10 at max with activity such as mowing the law  2. Bilateral hamstring flexibility to improve to lacking 15 degrees or less  3. Core strength to improve to good by demonstrating full bridge for 10 reps without pain, crunches 10 reps without pain and SLR for 10 reps without pain  4. Oswestry score of 35% functionally impaired or less  5. Patient to report generally overall feeling better with pains throughout his body less often. 6. Patient to report improved sleep to 5 hours consistently without waking due to back pain.         Patient goals: \"Hope I can feel better\"       Pt. Education:  [x] Yes  [] No  [x] Reviewed Prior HEP/Ed  Method of Education: [x] Verbal  [x] Demo  [x] Written  Comprehension of Education:  [x] Verbalizes understanding. [x] Demonstrates understanding. [] Needs review. [] Demonstrates/verbalizes HEP/Ed previously given. 8/24/20: fall prevention, LTR, SKTC, piriformis stretch, bridging, prone knee flexion. Plan: [x] Continue current frequency toward long and short term goals.     [x] Specific Instructions for subsequent treatments  decreased LB/LE pain, increase lumbar ext and rotation ROM and strengthen hips/gluteals in order to improved ADLS and increase community walking time without LE cramping.             Time In: 1028          Time Out: 1140    Electronically signed by:  Luis Brower PTA

## 2020-08-31 ENCOUNTER — HOSPITAL ENCOUNTER (OUTPATIENT)
Dept: PHYSICAL THERAPY | Age: 80
Setting detail: THERAPIES SERIES
Discharge: HOME | End: 2020-08-31
Payer: MEDICARE

## 2020-08-31 PROCEDURE — 97110 THERAPEUTIC EXERCISES: CPT

## 2020-08-31 NOTE — FLOWSHEET NOTE
[] [facility]) - [facility]  Therapy  [address]  [phone]  F: [phone] [] [address]  [place] [address]   [address]  P: [phone]  F: [phone] [] [address] &  Therapy  [address] [facility]  P: [phone]  F: [phone] [] [address]  [facility]   [address]   [place]: [phone]  F: [phone]      Physical Therapy Daily Treatment Note    Date:  2020  Patient Name:  Dominique Wyatt    :  1940  MRN: [id number]   Patient: Dominique Wyatt                        : 1940                        MRN: [id number]  Physician: Dr. Felicia Odonnell: [facility]  Medical Diagnosis: lumbar radiculopathy, chronic                Rehab Codes: M 54.5, M 62.81, M 62.830, R 26.2  Onset Date: 20               Next 's appt.: 20  Visit# / total visits: ; Progress note for Medicare patient due at visit 10     Cancels/No Shows: 0/0    Subjective:    Pain:  [x] Yes  [] No Location:   LB  Pain  Rating: (0-10 scale 5/10 waist band area of LB  Pain altered Tx:  [x] No  [] Yes  Action:  Comments:   Reports had had a bad day yesterday, increased pain. Did cut grass, states he was sob. States he forgot to put on L knee brace today but cortizone injection has been helpful. 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      Precautions:   Exercises:completed exercises marked with \"x\"  Exercise  LB/radicular pain Reps/ Time Weight/ Level  Comments   Nu step  6 mins  L4 x 5 mins L3           Standing violet LE    Added all standing    Gastro stretch 3x20\"  x wedge   Hip abd w/ iso abdom 12x A x    Hip ext w/ iso abdom 12x A x    Wall slide squats 15x  x           Sitting moi LE        lumbar flexion stretch 3x15\"   x HEP   Lumbar rotation stretch   x    HS stretching 3x20\"   Moi, stool   LAQ 10x   moi          Supine moi LE       LTR 10x10\"       SKTC 3x15\"      Piriformis stretch moi 3x20\"      Heel slides moi 12x      SAQ 12x A  Added  8/26   SLR 10x ea    HEP   bridging 15x      Crunches 10 x    HEP   Butterfly stretch 3 x 15 sec  HEP          SL clamshells 10x A  Added 8/26          Prone HS curls 15x1 A  Over one pillow    Prone hip flexor strech add   Add as able (HS cramps 8/24)          Quadriped prayer stretch add   Center, R/L, add as able.                                    Other:   SESSION CUT SHORT AS A BEDBUG FOUND ON PT. HE DID NOT WANT TO CHANGE OUT OF HIS CLOTHING. [address] [facility], EDUCATED PT ON BASIC WAYS TO RESOLVE [address]. (INFORMED PT THIS WRITER WOULD TRY TO GET ADD'L WRITTEN EDUCAITON ON HOW TO TREAT AND RESOLVE ISSUE)      Specific Instructions for next treatment: Progress core and hip strength, decrease radicular LB/LE pain, education on body mechanics, posture and positioning. Treatment Charges: Mins Units   [x]  Modalities HP      [x]  Ther Exercise  25 2   []  Manual Therapy     []  Ther Activities     []  Aquatics     []  Vasocompression     []  Other     Total Treatment time 25 2       Assessment: [] Progressing toward goals      [] No change. [x] Other:Short session today, see above other comment. Focus on lumbar ROM and hip/gluteal strengthening. Intermittent VC for posture and exercise technique.    [x] Patient would continue to benefit from skilled physical therapy services in order to: decreased LB/LE pain, increase lumbar ext and rotation ROM and strengthen hips/gluteals in order to improved ADLS and increase community walking time without LE cramping.     STG: (to be met in 9 treatments)  1. ? Pain: Low back pain improve to 5/10 at max with activity such as mowing the lawn  2. ? ROM: Bilateral hamstring flexibility improve to lacking 28 degrees or less on the left, and 20 degrees or less on the right  3. ? Strength: B LE strength to grossly improve to 4/5  4. ? Function: Patient to report improved ease of walking without cramping in bilateral legs  5. Patient to be independent with home exercise program as demonstrated by performance with correct form without cues. 6. Demonstrate Knowledge of fall prevention  LTG: (to be met in 18 treatments)  1. Low back pain to improve to 2/10 at max with activity such as mowing the law  2. Bilateral hamstring flexibility to improve to lacking 15 degrees or less  3. Core strength to improve to good by demonstrating full bridge for 10 reps without pain, crunches 10 reps without pain and SLR for 10 reps without pain  4. Oswestry score of 35% functionally impaired or less  5. Patient to report generally overall feeling better with pains throughout his body less often. 6. Patient to report improved sleep to 5 hours consistently without waking due to back pain.         Patient goals: \"Hope I can feel better\"       Pt. Education:  [x] Yes  [] No  [x] Reviewed Prior HEP/Ed  Method of Education: [x] Verbal  [x] Demo  [x] Written  Comprehension of Education:  [x] Verbalizes understanding. [x] Demonstrates understanding. [] Needs review. [] Demonstrates/verbalizes HEP/Ed previously given. 8/24/20: fall prevention, LTR, SKTC, piriformis stretch, bridging, prone knee flexion. Plan: [x] Continue current frequency toward long and short term goals.     [x] Specific Instructions for subsequent treatments  decreased LB/LE pain, increase lumbar ext and rotation ROM and strengthen hips/gluteals in order to improved ADLS and increase community walking time without LE cramping.             Time In: 1045         Time Out:  1118    Electronically signed by: [address] [place] [address] [id number], PTA

## 2020-09-02 ENCOUNTER — CARE COORDINATION (OUTPATIENT)
Dept: CARE COORDINATION | Age: 80
End: 2020-09-02

## 2020-09-02 NOTE — CARE COORDINATION
Attempting to reach patient for a follow up care coordination call regarding any needs, questions or concerns.   Kenya Metcalf, [address]  Mailbox is full

## 2020-09-10 ENCOUNTER — CARE COORDINATION (OUTPATIENT)
Dept: CARE COORDINATION | Age: 80
End: 2020-09-10

## 2020-09-17 ENCOUNTER — OFFICE VISIT (OUTPATIENT)
Dept: ORTHOPEDIC SURGERY | Age: 80
End: 2020-09-17
Payer: MEDICARE

## 2020-09-17 PROCEDURE — 99213 OFFICE O/P EST LOW 20 MIN: CPT | Performed by: STUDENT IN AN ORGANIZED HEALTH CARE EDUCATION/TRAINING PROGRAM

## 2020-09-17 RX ORDER — SENNOSIDES 8.6 MG
650 CAPSULE ORAL EVERY 8 HOURS PRN
Qty: 60 TABLET | Refills: 2 | Status: SHIPPED | OUTPATIENT
Start: 2020-09-17 | End: 2021-05-05 | Stop reason: SDUPTHER

## 2020-09-18 ENCOUNTER — CARE COORDINATION (OUTPATIENT)
Dept: CARE COORDINATION | Age: 80
End: 2020-09-18

## 2020-09-18 NOTE — PROGRESS NOTES
[facility]  [facility]  [address] [facility] [address]  Dept: [phone]  Dept Fax: [phone]        Ambulatory Follow-Up    Subjective:   Mayra Henry is a 79y.o. year old male who presents to our office today for routine followup regarding:   his   1. Osteoarthritis of left hip, unspecified osteoarthritis type    . Chief Complaint   Patient presents with    Hip Pain     bilateral L>R     HPI  Mr. Kenan Rosales is a 42-year-old gentleman that presents to clinic today for follow-up. He was last seen in the office on 8/6/2020 for lower back and bilateral hip pain. He is also been seen in the clinic for other multiple injuries including left knee arthritis and nondisplaced fractures to both feet. Patient was advised to go to physical therapy as well as started on a Medrol Dosepak at his last visit on 8/6/2020. He states that initially physical therapy did not seem to help, but with continued therapy he noted improvement in his lower back and hip pain particularly with some of the stretches and strengthening exercises they taught him. He states that he is completed his therapy and continues to do these exercises at home. He states that he thinks the steroids helped significantly and overall his pain is still better than at prior appointment. He also states that he is started taking some extra strength Tylenol provided to him by his brother and requests a prescription for this in office today. He continues to endorse lower back pain that radiates to both hips, left worse than right. Mild groin pain. No significant associated weakness or numbness or tingling in the extremities. Review of Systems   Constitutional: Negative for fever and chills. HENT: Negative for congestion. Eyes: Negative for blurred vision and double vision. Respiratory: Negative for cough, shortness of breath and wheezing. Cardiovascular: Negative for chest pain and palpitations. Gastrointestinal: Negative for nausea. Negative for vomiting. Musculoskeletal: Positive for myalgias, lower back pain, and pain to both hips and left knee. .   Skin: Negative for itching and rash. Neurological: Negative for dizziness, sensory change and headaches. Psychiatric/Behavioral: Negative for depression and suicidal ideas. I have reviewed the CC, HPI, ROS, PMH, FHX, Social History. I agree with the documentation provided by other staff, residents, and/or medical students and have reviewed their documentation prior to providing my signature indicating agreement. Objective : There were no vitals taken for this visit. General: AAOx3, NAD, appears stated age  Ortho Exam  MSK: Patient able to stand upright with overall well-balanced global spinal alignment. Negative straight leg raise bilaterally. Mild groin pain with passive internal and external rotation of both hips, left more significantly than the right. Skin intact to lumbar spine and both hips. Non tender to palpation spinal midline, mild paraspinal tenderness bilaterally, left worse than right. EHL/FHL/TA/GSC gross motor intact bilaterally. Sural, saphenous, superificial/deep peroneal, and plantar nerve distribution SILT bilaterally. CV: no obvious JVD, no dependent edema, distal pulses 2+  Respiratory: chest rise symmetric, unlabored respirations, no audible wheezing  Skin: warm, well perfused, no obvious rashes or lesions  Psych: patient displays understanding of exam, diagnosis, and plan. Radiology:   History:   Lower back and bilateral hip pain (left worse than right)    Findings:   Views: 2V Hip   Weight bearing: No   Findings: Low AP pelvis and frog-leg lateral of left hip obtained in office today reviewed and remarkable for mild degenerative changes to bilateral hip joints with some small osteophytes and subchondral cystic changes noted, left worse than right. Also noted mild bilateral cam deformities.   No significant findings of any acute fracture, dislocation/subluxation, radiopaque foreign bodies, or other osseous lesions appreciated   Previous comparison films: March 20, 2020. Impression:  Early, mild hip degenerative changes left worse than right    Assessment:      1. Osteoarthritis of left hip, unspecified osteoarthritis type       Plan:      -Reviewed physical exam and x-ray findings a great length with the patient in office today. Discussed that there are mild degenerative changes to both hips, left slightly worse than right, but overall symptoms could be confounding with concomitant back pain. Given the overall improvement with his hip pain symptoms with physical therapy for the lumbar spine we discussed and offered continue physical therapy particularly for the hips, but the patient deferred at this time. He states that he will continue to do home stretching and strengthening exercises shown to him in therapy as they have helped with his hip pain. -Extra strength arthritis Tylenol prescription provided for the patient in office today.  -Discussed with the patient potential next steps could include pain management referral for lumbar epidural steroid injections versus a potential hip corticosteroid injection. Diagnostic and therapeutic benefit of hip injection could be evaluation of percentage symptomatic improvement with a hip injection. If minimal relief from a hip injection pain management referral and lumbar epidural steroid injection could prove of significant benefit. Patient expressed understanding of and agreement to this. All questions concerns were answered.  -At this time patient will continue with home therapy strengthening and stretching exercises as well as Tylenol to help with pain. Follow-up in the office in 6 to 8 weeks as needed or sooner to discuss pain management referral versus a hip steroid injection. Follow up:Return if symptoms worsen or fail to improve.     Orders Placed This Encounter   Medications    acetaminophen (TYLENOL 8 HOUR ARTHRITIS PAIN) 650 MG extended release tablet     Sig: Take 1 tablet by mouth every 8 hours as needed for Pain     Dispense:  60 tablet     Refill:  2        No orders of the defined types were placed in this encounter.     Electronically signed by Anuj Funk DO on 9/18/2020 at 5:31 PM

## 2020-09-18 NOTE — CARE COORDINATION
Attempting to reach patient for a follow up care coordination call. Left detailed message for the patient to return my call with any questions or concerns. Noted PCP appointment next week.

## 2020-09-21 ENCOUNTER — HOSPITAL ENCOUNTER (OUTPATIENT)
Age: 80
Setting detail: SPECIMEN
Discharge: HOME | End: 2020-09-21
Payer: MEDICARE

## 2020-09-21 LAB
ANION GAP SERPL CALCULATED.3IONS-SCNC: 11 MMOL/L (ref 9–17)
BUN BLDV-MCNC: 16 MG/DL (ref 8–23)
BUN/CREAT BLD: ABNORMAL (ref 9–20)
CALCIUM SERPL-MCNC: 9.5 MG/DL (ref 8.6–10.4)
CHLORIDE BLD-SCNC: 102 MMOL/L (ref 98–107)
CO2: 25 MMOL/L (ref 20–31)
CREAT SERPL-MCNC: 1.47 MG/DL (ref 0.7–1.2)
GFR AFRICAN AMERICAN: 56 ML/MIN
GFR NON-AFRICAN AMERICAN: 46 ML/MIN
GFR SERPL CREATININE-BSD FRML MDRD: ABNORMAL ML/MIN/{1.73_M2}
GFR SERPL CREATININE-BSD FRML MDRD: ABNORMAL ML/MIN/{1.73_M2}
GLUCOSE BLD-MCNC: 94 MG/DL (ref 70–99)
POTASSIUM SERPL-SCNC: 5.3 MMOL/L (ref 3.7–5.3)
SODIUM BLD-SCNC: 138 MMOL/L (ref 135–144)

## 2020-09-24 ENCOUNTER — CARE COORDINATION (OUTPATIENT)
Dept: CARE COORDINATION | Age: 80
End: 2020-09-24

## 2020-09-24 NOTE — CARE COORDINATION
Attempting to reach patient for a follow up care coordination call regarding any needs, questions or concerns. AMBER Thomas [phone]  Noted improvement in PCP appointment notes as follows:  He has stopped the metoprolol. HR stable, BP has improved. BP stable today. His fatigue has improved. He states he has increased his water intake.  He states his dizziness has resolved.      We started on metformin for prediabtes, a1c was 6.4

## 2020-09-29 ENCOUNTER — CARE COORDINATION (OUTPATIENT)
Dept: CARE COORDINATION | Age: 80
End: 2020-09-29

## 2020-10-01 PROBLEM — I63.9 CEREBROVASCULAR ACCIDENT (HCC): Status: ACTIVE | Noted: 2018-08-13

## 2020-10-01 PROBLEM — I51.7 LEFT VENTRICULAR HYPERTROPHY: Status: ACTIVE | Noted: 2020-10-01

## 2020-10-01 PROBLEM — R94.31 ABNORMAL ECG: Status: ACTIVE | Noted: 2020-10-01

## 2020-10-01 PROBLEM — Z01.818 PRE-OPERATIVE CLEARANCE: Status: ACTIVE | Noted: 2017-08-31

## 2020-10-01 PROBLEM — S00.03XA HEMATOMA OF SCALP: Status: ACTIVE | Noted: 2020-10-01

## 2020-10-01 PROBLEM — I27.20 PULMONARY HYPERTENSION, MILD (HCC): Status: ACTIVE | Noted: 2017-08-31

## 2020-10-01 PROBLEM — F22 DELUSIONS OF PARASITOSIS (HCC): Status: ACTIVE | Noted: 2020-10-01

## 2020-10-01 PROBLEM — R07.9 CHEST PAIN, UNSPECIFIED: Status: ACTIVE | Noted: 2020-10-01

## 2020-10-01 PROBLEM — I35.1 MILD AORTIC VALVE REGURGITATION: Status: ACTIVE | Noted: 2017-08-31

## 2020-10-07 ENCOUNTER — CARE COORDINATION (OUTPATIENT)
Dept: CARE COORDINATION | Age: 80
End: 2020-10-07

## 2020-10-21 ENCOUNTER — CARE COORDINATION (OUTPATIENT)
Dept: CARE COORDINATION | Age: 80
End: 2020-10-21

## 2020-10-21 NOTE — CARE COORDINATION
Ambulatory Care Coordination Note  10/21/2020  CM Risk Score: 5  Charlson 10 Year Mortality Risk Score: 100%     ACC: Florinda Benites RN    Summary Note: Spoke with patient who denied any needs from PCP or ACM at this time. Patient stated he has a US scheduled for 10-29-20. ACM verified that he knows where to report to. Per patient other than some chronic pain he is doing ok. Precautions reviewed for COVID-19 per CDC  Wash hands often with soap and water for at least 20 seconds  When soap is not available us hand  with at least 70% alcohol  Avoid touching your face  Avoid close contact with others  Maintain 6 feet distance if possible    If you are sick:  Cover mouth and nose when coughing or sneezing and then wash hands  Wear a mask when around others  Clean and disinfect surfaces often with soap or detergent and water. ACM will follow up in 2 weeks for needs. Care Coordination Interventions    Program Enrollment:  Complex Care  Referral from Primary Care Provider:  Yes  Suggested Interventions and [address] [place] [address] Hwy:  Completed  Physical Therapy:  Completed  Transportation Support:  Declined  Zone Management Tools: In Process         Goals Addressed                 This Visit's Progress     Conditions and Symptoms   Improving     I will schedule office visits, as directed by my provider. I will keep my appointment or reschedule if I have to cancel. I will notify my provider of any barriers to my plan of care. I will follow my Zone Management tool to seek urgent or emergent care. I will notify my provider of any symptoms that indicate a worsening of my condition. Barriers: overwhelmed by complexity of regimen  Plan for overcoming my barriers: work with ACM  Confidence: 10/10  Anticipated Goal Completion Date: 11.25.20              Prior to Admission medications    Medication Sig Start Date End Date Taking?  Authorizing Provider   doxepin (SINEQUAN) 25 MG capsule Take by mouth 3/20/20   Historical Provider, MD   esomeprazole Magnesium (NEXIUM) 20 MG PACK Take 20 mg by mouth 3/21/20   Historical Provider, MD   ticagrelor (BRILINTA) 90 MG TABS tablet Take by mouth 1/16/20   Historical Provider, MD   magnesium gluconate (MAGONATE) 500 MG tablet Take 500 mg by mouth 2 times daily    Historical Provider, MD   acetaminophen (TYLENOL 8 HOUR ARTHRITIS PAIN) 650 MG extended release tablet Take 1 tablet by mouth every 8 hours as needed for Pain 9/17/20   Flavio Torres,    metFORMIN (GLUCOPHAGE-XR) 500 MG extended release tablet Take 1 tablet by mouth daily (with breakfast) 8/20/20   JW Pak CNP   clopidogrel (PLAVIX) 75 MG tablet Take 1 tablet by mouth daily 7/1/20   Montserrat Orta, JW Perrin CNP   metoprolol tartrate (LOPRESSOR) 25 MG tablet 1/2 tablet by mouth twice a day for hypertension 5/26/20 8/24/20  JW Pak CNP   atorvastatin (LIPITOR) 40 MG tablet Take 1 tablet by mouth daily 5/20/20   Montserrat Orta, JW - CNP   carbidopa-levodopa (SINEMET)  MG per tablet  1/7/20   Historical Provider, MD   nitroGLYCERIN (NITROSTAT) 0.4 MG SL tablet Place 1 tablet under the tongue every 5 minutes as needed for Chest pain If no relief of chest pain after 3rd dose, go to the ER or call 911  Patient not taking: Reported on 9/30/2020 4/2/19   JW Pak CNP   butalbital-acetaminophen-caffeine (FIORICET, ESGIC) -44 MG per tablet Take 1 tablet by mouth every 6 hours as needed for Headaches    Historical Provider, MD       Future Appointments   Date Time Provider Shireen Charles   10/29/2020 11:30 AM STV ULTRASOUND RM 1 [facility] US STV Radiolog   1/21/2021 11:00 AM Montserrat Orta, 1 [facility]      and   General Assessment    Do you have any symptoms that are causing concern?:  No

## 2020-10-29 ENCOUNTER — HOSPITAL ENCOUNTER (OUTPATIENT)
Dept: ULTRASOUND IMAGING | Age: 80
Discharge: HOME | End: 2020-10-31
Payer: MEDICARE

## 2020-10-29 PROCEDURE — 76770 US EXAM ABDO BACK WALL COMP: CPT

## 2020-10-31 PROBLEM — Z01.818 PRE-OPERATIVE CLEARANCE: Status: RESOLVED | Noted: 2017-08-31 | Resolved: 2020-10-31

## 2020-11-10 ENCOUNTER — TELEPHONE (OUTPATIENT)
Dept: PHARMACY | Facility: CLINIC | Age: 80
End: 2020-11-10

## 2020-11-10 NOTE — TELEPHONE ENCOUNTER
CLINICAL PHARMACY: ADHERENCE REVIEW  Identified care gap per [employer]; fills at Non-Preferred Pharmacy [facility]: Diabetes and Statin adherence    Last Office Visit: 9/21/2020    ASSESSMENT  DIABETES ADHERENCE  (YTD [place] = 100%) Potential Fail Date: 11/13/2020; Needs 48 days to be adherent per 10/17/2020 claims data. Per [employer] Records   Metformin last filled on 9/13/2020 for a 30 day supply. Per chart review at [address] on 9/21/2020 - patient stopped taking metformin d/t fatigue. PCP was in agreement. Metformin still active on home medication list.     Per [facility]:   Metformin last picked up on 9/19/2020 for 30 day supply. There are refills remaining. Lab Results   Component Value Date    LABA1C 6.4 (H) 07/20/2020    LABA1C 5.8 04/02/2019    LABA1C 6.1 (H) 05/17/2018       STATIN ADHERENCE  (2019 [place] = 35%; YTD PDC = 94%)     Per Insurance Records   Atorvastatin last filled on 8/10/2020 for a 90 day supply. Due to refill on 11/22/2020. Patient is adherent. Lab Results   Component Value Date    CHOL 101 07/20/2020    TRIG 70 07/20/2020    HDL 51 07/20/2020    LDLCHOLESTEROL 36 07/20/2020    LDLCALC 58 01/08/2018     ALT   Date Value Ref Range Status   07/20/2020 15 5 - 41 U/L Final     AST   Date Value Ref Range Status   07/20/2020 20 <40 U/L Final     The ASCVD Risk score (Arsh Hidalgo., et al., 2013) failed to calculate for the following reasons: The 2013 ASCVD risk score is only valid for ages 36 to 78    The patient has a prior MI or stroke diagnosis     PLAN  Attempting to reach patient to review.  Left message asking for return call. Will assume that patient has stopped metformin therapy based on 9/21/2020 OV notes and lack of recent refill. Will remove metformin from home medication list at this time and will sign off.      Jona Holly, KellyD, [facility]  Direct: [phone]  Department, toll free [phone], option 7        For Pharmacy Admin

## 2020-11-17 ENCOUNTER — CARE COORDINATION (OUTPATIENT)
Dept: CARE COORDINATION | Age: 80
End: 2020-11-17

## 2020-11-17 NOTE — CARE COORDINATION
Spoke with patient who stated he has not heard how his US results are from 10.29.20. ACM will rout to PCP for recommendations.

## 2020-11-20 NOTE — TELEPHONE ENCOUNTER
Let patient know that ultrasound is overall good except it does show a large gallstone in his gallbladder. That is in the right upper part of his abdomen. Let me know if he is having any pain up in that area. The only treatment for a gallstone that size is to have a gallbladder removal but if he is not having any discomfort we can just watch his liver enzymes like we usually do and make sure everything looks okay.

## 2020-12-17 ENCOUNTER — CARE COORDINATION (OUTPATIENT)
Dept: CARE COORDINATION | Age: 80
End: 2020-12-17

## 2020-12-17 NOTE — CARE COORDINATION
Attempting to reach patient for a follow up care coordination call regarding any needs, questions or concerns.   Tru Donaldson, [address] Text: The defect edges were debeveled with a #15 scalpel blade.  Given the location of the defect, shape of the defect and the proximity to free margins a full thickness skin graft was deemed most appropriate.  Using a sterile surgical marker, the primary defect shape was transferred to the donor site. The area thus outlined was incised deep to adipose tissue with a #15 scalpel blade.  The harvested graft was then trimmed of adipose tissue until only dermis and epidermis was left.  The skin margins of the secondary defect were undermined to an appropriate distance in all directions utilizing iris scissors.  The secondary defect was closed with interrupted buried subcutaneous sutures.  The skin edges were then re-apposed with running  sutures.  The skin graft was then placed in the primary defect and oriented appropriately.

## 2020-12-29 ENCOUNTER — CARE COORDINATION (OUTPATIENT)
Dept: CARE COORDINATION | Age: 80
End: 2020-12-29

## 2020-12-29 NOTE — CARE COORDINATION
Attempting to reach patient for a follow up care coordination call. Left detailed message for the patient to return my call with any questions or concerns.   Girma Rushing RN, ambulatory care manager

## 2021-01-27 ENCOUNTER — CARE COORDINATION (OUTPATIENT)
Dept: CARE COORDINATION | Age: 81
End: 2021-01-27

## 2021-01-27 NOTE — CARE COORDINATION
Ambulatory Care Coordination Note  1/27/2021  CM Risk Score: 5  Charlson 10 Year Mortality Risk Score: 100%     ACC: Osmel Cole, RN    Summary Note: Spoke with patient who denied any needs from PCP or ACM at this time. Per patient he is doing well, he would like to receive the Covid vaccine. ACM explained that she would add him to the waiting list and he will receive a call when he is scheduled. Patient reports that he  is staying in away from covid. ACM will graduate at this time but be available for any needs in the future. Care Coordination Interventions    Program Enrollment: Complex Care  Referral from Primary Care Provider: Yes  Suggested Interventions and [address]: Completed  Physical Therapy: Completed  Transportation Support: Declined  Zone Management Tools: In Process         Goals Addressed                 This Visit's Progress     Conditions and Symptoms   On track     I will schedule office visits, as directed by my provider. I will keep my appointment or reschedule if I have to cancel. I will notify my provider of any barriers to my plan of care. I will follow my Zone Management tool to seek urgent or emergent care. I will notify my provider of any symptoms that indicate a worsening of my condition. Barriers: overwhelmed by complexity of regimen  Plan for overcoming my barriers: work with ACM  Confidence: 10/10  Anticipated Goal Completion Date: 11.25.20              Prior to Admission medications    Medication Sig Start Date End Date Taking?  Authorizing Provider   atorvastatin (LIPITOR) 40 MG tablet take 1 tablet by mouth once daily 11/24/20   JW Orosco - CNP   doxepin [facility]) 25 MG capsule Take by mouth 3/20/20   Historical Provider, MD   esomeprazole Magnesium (NEXIUM) 20 MG PACK Take 20 mg by mouth 3/21/20   Historical Provider, MD   ticagrelor (BRILINTA) 90 MG TABS tablet Take by mouth 1/16/20   Historical Provider, MD magnesium gluconate (MAGONATE) 500 MG tablet Take 500 mg by mouth 2 times daily    Historical Provider, MD   acetaminophen (TYLENOL 8 HOUR ARTHRITIS PAIN) 650 MG extended release tablet Take 1 tablet by mouth every 8 hours as needed for Pain 9/17/20   Joann Domingo DO   clopidogrel (PLAVIX) 75 MG tablet Take 1 tablet by mouth daily 7/1/20   JW Jarrell CNP   metoprolol tartrate (LOPRESSOR) 25 MG tablet 1/2 tablet by mouth twice a day for hypertension 5/26/20 8/24/20  JW Jarrell CNP   carbidopa-levodopa (SINEMET)  MG per tablet  1/7/20   Historical Provider, MD   nitroGLYCERIN (NITROSTAT) 0.4 MG SL tablet Place 1 tablet under the tongue every 5 minutes as needed for Chest pain If no relief of chest pain after 3rd dose, go to the ER or call 911  Patient not taking: Reported on 9/30/2020 4/2/19   JW Jarrell CNP   butalbital-acetaminophen-caffeine (FIORICET, ESGIC) -40 MG per tablet Take 1 tablet by mouth every 6 hours as needed for Headaches    Historical Provider, MD       No future appointments.    and   General Assessment    Do you have any symptoms that are causing concern?: No

## 2021-04-02 ENCOUNTER — TELEPHONE (OUTPATIENT)
Dept: PAIN MANAGEMENT | Age: 81
End: 2021-04-02

## 2021-04-05 ENCOUNTER — TELEPHONE (OUTPATIENT)
Dept: GASTROENTEROLOGY | Age: 81
End: 2021-04-05

## 2021-04-05 ENCOUNTER — CARE COORDINATION (OUTPATIENT)
Dept: CARE COORDINATION | Age: 81
End: 2021-04-05

## 2021-04-05 NOTE — CARE COORDINATION
Assistance  Is patient able to live independently?: Yes     Current Housing: Private Residence           Frequent urination at night?: Yes  Do you use rails/bars?: No  Do you have a non-slip tub mat?: No     Are you experiencing loss of meaning?: No (Comment: sometimes I feel lonely)  Are you experiencing loss of hope and peace?: No     Thinking about your patient's physical health needs, are there any symptoms or problems (risk indicators) you are unsure about that require further investigation?: No identified areas of uncertainly or problems already being investigated   Are the patients physical health problems impacting on their mental well-being?: No identified areas of concern   Are there any problems with your patients lifestyle behaviors (alcohol, drugs, diet, exercise) that are impacting on physical or mental well-being?: No identified areas of concern   Do you have any other concerns about your patients mental well-being?  How would you rate their severity and impact on the patient?: Mild problems - don't interfere with function   How would you rate their home environment in terms of safety and stability (including domestic violence, insecure housing, neighbor harassment)?: Consistently safe, supportive, stable, no identified problems   How do daily activities impact on the patient's well-being? (include current or anticipated unemployment, work, caregiving, access to transportation or other): No identified problems or perceived positive benefits   How would you rate their social network (family, work, friends)?: Adequate participation with social networks   How would you rate their financial resources (including ability to afford all required medical care)?: Financially secure, resources adequate, no identified problems   How wells does the patient now understand their health and well-being (symptoms, signs or risk factors) and what they need to do to manage their health?: Reasonable to good understanding and already engages in managing health or is willing to undertake better management   How well do you think your patient can engage in healthcare discussions? (Barriers include language, deafness, aphasia, alcohol or drug problems, learning difficulties, concentration): Clear and open communication, no identified barriers   Do other services need to be involved to help this patient?: Other care/services in place but not sufficient   Are current services involved with this patient well-coordinated? (Include coordination with other services you are now recommendation): Required care/services in place with some coordination barriers   Suggested Interventions and Community Resources   Disease Assocation: Completed   Physical Therapy: Completed   Transportation Services: Declined   Zone Management Tools: In Process                  Prior to Admission medications    Medication Sig Start Date End Date Taking?  Authorizing Provider   esomeprazole (NEXIUM) 20 MG delayed release capsule Take 1 capsule by mouth every morning (before breakfast) 3/30/21   JW Dillon CNP   atorvastatin (LIPITOR) 40 MG tablet take 1 tablet by mouth once daily 11/24/20   JW Dillon CNP   doxepin (SINEQUAN) 25 MG capsule Take by mouth 3/20/20   Historical Provider, MD   ticagrelor (BRILINTA) 90 MG TABS tablet Take by mouth 1/16/20   Historical Provider, MD   magnesium gluconate (MAGONATE) 500 MG tablet Take 500 mg by mouth 2 times daily    Historical Provider, MD   acetaminophen (TYLENOL 8 HOUR ARTHRITIS PAIN) 650 MG extended release tablet Take 1 tablet by mouth every 8 hours as needed for Pain 9/17/20   Mc Lemus DO   clopidogrel (PLAVIX) 75 MG tablet Take 1 tablet by mouth daily 7/1/20   JW Dillon CNP   metoprolol tartrate (LOPRESSOR) 25 MG tablet 1/2 tablet by mouth twice a day for hypertension 5/26/20 8/24/20  JW Dillon CNP   carbidopa-levodopa (SINEMET)

## 2021-04-05 NOTE — TELEPHONE ENCOUNTER
Writer received call from [address] a nurse care coordinator who works with patient's PCP Kelleen Denver CNP. Angie is asking if our office can reach out to the patient to schedule colonoscopy from referral that was placed by Frank Cobb. Shanice states that patient was given a handful of referrals at his last OV and is just trying to make sure he follow up with all of them. Patient can be called at [phone]. Patient's last colon was done by Dr. Taj Fox on 9/19/17 and was due a 1 year colon recall.

## 2021-04-06 ENCOUNTER — CARE COORDINATION (OUTPATIENT)
Dept: CARE COORDINATION | Age: 81
End: 2021-04-06

## 2021-04-06 ENCOUNTER — HOSPITAL ENCOUNTER (OUTPATIENT)
Dept: PHYSICAL THERAPY | Age: 81
Setting detail: THERAPIES SERIES
Discharge: HOME | End: 2021-04-06
Payer: MEDICARE

## 2021-04-06 PROCEDURE — 97110 THERAPEUTIC EXERCISES: CPT

## 2021-04-06 PROCEDURE — 97162 PT EVAL MOD COMPLEX 30 MIN: CPT

## 2021-04-08 NOTE — TELEPHONE ENCOUNTER
Per Dr Bruna Barney last progress note on 4/3/19, pt to f/u in office in 6 months and if able to stop antiplatelets, a colon would be scheduled. Called pt and LVM asking pt to call the office.  He will need OV first.

## 2021-04-09 ENCOUNTER — HOSPITAL ENCOUNTER (OUTPATIENT)
Dept: PHYSICAL THERAPY | Age: 81
Setting detail: THERAPIES SERIES
Discharge: HOME | End: 2021-04-09
Payer: MEDICARE

## 2021-04-09 PROCEDURE — 97110 THERAPEUTIC EXERCISES: CPT

## 2021-04-09 NOTE — PROGRESS NOTES
toward goals. [] No change. [] Other:    [] Patient would continue to benefit from skilled physical therapy services in order to: improve balance, strength, function. STG: (to be met in 6 treatments)  1. ? Pain: Improved back pain to 3-4/10.  2. ? ROM:  Improved flexion to 1/2 distal from patella to ankles, extension improved to 10 degrees  3. ? Function: Improved functional tolerance of ambulation up to 50' safely in controlled environment without (A) device, able to do 6\" stair in clinic with UE (A), improved balance testing to 5 seconds for tandem balance. 4. Independent with Home Exercise Programs  5. Demonstrate Knowledge of fall prevention  LTG: (to be met in 12 treatments)  6. ? Pain: Improved back pain to 0-1/10.  7. ? ROM:  Improved flexion to 2/3rds distal from patella to ankles, extension improved to 15 degrees  8. ? Function: Improved functional tolerance of ambulation up to 150' safely in controlled environment without (A) device, able to do 8\" stair in clinic with UE (A), improved balance testing to 8 seconds for tandem balance, 5 seconds for SL balance  9. Independent with Home Exercise Programs  10. Demonstrate Knowledge of fall prevention  Patient goals:  Hope it will work    Pt. Education:  [x] Yes  [] No  [] Reviewed Prior HEP/Ed  Method of Education: [x] Verbal  [] Demo  [] Written  Comprehension of Education:  [x] Verbalizes understanding. [] Demonstrates understanding. [] Needs review. [] Demonstrates/verbalizes HEP/Ed previously given. Plan: [x] Continue per plan of care.    [] Other:      Treatment Charges: Mins Units   []  Modalities     []  Ther Exercise 40 3   []  Manual Therapy     []  Ther Activities     []  Aquatics     []  Neuromuscular     [] Vasocompression     [] Gait Training     [] Dry needling        [] 1 or 2 muscles        [] 3 or more muscles     []  Other     Total Treatment time 40 3     Time In:240            Time Out: 320    Electronically signed by: Nesha Vicente, PTA

## 2021-04-12 ENCOUNTER — CARE COORDINATION (OUTPATIENT)
Dept: CARE COORDINATION | Age: 81
End: 2021-04-12

## 2021-04-12 NOTE — PROGRESS NOTES
[address] [facility]  [address] [facility]         Phone: [phone]       Fax: [phone]    Physical Therapy Lower Extremity Evaluation    Date:  2021  Patient: Osiris Zelaya  : 1940  MRN: [id number]  Physician: Shasha Han     Insurance: [employer] HMO Med necessity  Medical Diagnosis: DDD M51.36; spinal stenosis M48.061, parkinsons disease  Rehab Codes: low back pain M54.5, difficulty walking  Onset date: referral 3/30/21  Next Dr's appt.: PRN  Visit Count:    Cancel/No Show: 0/0    Subjective:   Patient presents to therapy with complaints of low back pain and complaints of difficulty with gait and loss of balance during gait. Patient notes increased complaints of back pain with picking up objects off of the ground and with laying flat on his back. Patient with difficulty with loss of balance and requires SC to safely ambulate, is limited with balance with clinical testing, and is limited with stairs at this time.      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 ADL/IADL Previous level of function Current level of function Who currently assists the patient with task   Bathing  [] Independent  [] Assist [] Independent  [] Assist    Dress/grooming [] Independent  [] Assist [] Independent  [] Assist    Transfer/mobility [] Independent  [] Assist [] Independent  [] Assist    Feeding [] Independent  [] Assist [] Independent  [] Assist    Toileting [] Independent  [] Assist [] Independent  [] Assist    Driving [] Independent  [] Assist [] Independent  [] Assist    Housekeeping [] Independent  [] Assist [x] Independent  [] Assist difficulty   Grocery shop/meal prep [] Independent  [] Assist [x] Independent  [] Assist difficulty     Gait Prior level of function Current level of function    [] Independent  [] Assist [] Independent  [x] Assist   Device: [] Independent [] Independent    [] Straight Cane [] Quad cane [x] Straight Cane [] Quad cane    [] Standard walker [] Rolling walker   [] 4 wheeled walker [] Standard walker [] Rolling walker   [] 4 wheeled walker    [] Wheelchair [] Wheelchair   Requires SC for safe ambulation for community and household ambulation. Pain:  [x] Yes  [] No Location: low back pain,  Pain Rating: (0-10 scale) 8/10  Pain altered Tx:  [] Yes  [x] No  Action:  Symptoms:  [] Improving [] Worsening [x] Same  Better:  [] AM    [] PM    [] Sit    [] Rise/Sit    []Stand    [] Walk    [] Lying    [] Other:  Worse: [] AM    [] PM    [] Sit    [] Rise/Sit    []Stand    [] Walk    [x] Lying    [x] Bend                             [] Valsalva    [] Other:  Sleep: [] OK    [x] Disturbed    Objective:  ROM:  Lumbar flexion to patella  Extension 5 degrees with central back pain    Balance:  Unable to do SLS (B)  Tandem balance: 5 seconds (R) behind (L), 3 seconds (L) behind (R)    Repeated testing not completed.     Ambulates with use of SC with short shuffling steps. FUNCTIONAL TESTS PAIN NO PAIN COMMENTS   Step Test 4 [] []    6 [] [] unable   8 [] [] unable   Squat [] []      Comments:                                Coby Fall Risk Assessment    Risk Factor Scale  Score   History of Falls [] Yes  [x] No 25  0 0   Secondary Diagnosis [x] Yes  [] No 15  0 15   Ambulatory Aid [] Furniture  [x] Crutches/cane/walker  [] None/bedrest/wheelchair/nurse 30  15  0 15   IV/Heparin Lock [] Yes  [] No 20  0 0   Gait/Transferring [] Impaired  [] Weak  [] Normal/bedrest/immobile 20  10  0 10   Mental Status [] Forgets limitations  [] Oriented to own ability 15  0 0      Total:40     Based on the Assessment score: check the appropriate box. []  No intervention needed   Low =   Score of 0-24    [x]  Use standard prevention interventions Moderate =  Score of 24-44   [x] Give patient handout and discuss fall prevention strategies   [x] Establish goal of education for patient/family RE: fall prevention strategies    []  Use high risk prevention interventions High = Score of 45 and higher   [] Give patient handout and discuss fall prevention strategies   [] Establish goal of education for patient/family Re: fall prevention strategies   [] Discuss lifeline / other resources    Electronically signed by:   Toney Davis PT    Assessment:  Patient with limited ROM, strength, functional tolerance of ambulation without (A) device, bending to pick objects off of ground and laying on back due to back pain. [x] Patient would continue to benefit from skilled physical therapy services in order to: improve balance, strength, function. Problems:    [x] ? Pain:  Pain 8/10 in low back     [x] ? ROM:  Limited lumbar mobility per record as listed above. [x] ? Function:   Patient with limited functional tolerance of ambulation without SC for balance, tolerance of stairs, tolerance of bending secondary to back symptoms, laying on back secondary to back symptoms. [x] ? Balance:  Limited balance with clinical balance testing as listed above     STG: (to be met in 6 treatments)  1. ? Pain: Improved back pain to 3-4/10.  2. ? ROM:  Improved flexion to 1/2 distal from patella to ankles, extension improved to 10 degrees  3. ? Function: Improved functional tolerance of ambulation up to 50' safely in controlled environment without (A) device, able to do 6\" stair in clinic with UE (A), improved balance testing to 5 seconds for tandem balance. 4. Independent with Home Exercise Programs  5. Demonstrate Knowledge of fall prevention  LTG: (to be met in 12 treatments)  6. ? Pain: Improved back pain to 0-1/10.  7. ? ROM:  Improved flexion to 2/3rds distal from patella to ankles, extension improved to 15 degrees  8. ? Function: Improved functional tolerance of ambulation up to 150' safely in controlled environment without (A) device, able to do 8\" stair in clinic with UE (A), improved balance testing to 8 seconds for tandem balance, 5 seconds for SL balance  9. Independent with Home Exercise Programs  10. Demonstrate Knowledge of fall prevention  Patient goals:  Hope it will work    Functional Assessment Used: optimal 12/3 75% limited  Current Status: Score  Goal Status: Score    Evaluation Complexity:  History (Personal factors, comorbidities) [] 0 [x] 1-2 [] 3+   Exam (limitations, restrictions) [] 1-2 [x] 3 [] 4+   Clinical presentation (progression) [] Stable [x] Evolving  [] Unstable   Decision Making [] Low [x] Moderate [] High    [] Low Complexity [x] Moderate Complexity [] High Complexity     Rehab Potential:  [] Good  [x] Fair  [] Poor   Suggested Professional Referral:  [x] No  [] Yes:  Barriers to Goal Achievement[de-identified]  [x] No  [] Yes:  Domestic Concerns:  [x] No  [] Yes:    Pt. Education:  [x] Plans/Goals, Risks/Benefits discussed  [x] Home exercise program    Method of Education: [x] Verbal  [] Demo  [x] Written  Comprehension of Education:  [x] Verbalizes understanding.   [] Demonstrates understanding. [] Needs Review. [] Demonstrates/verbalizes understanding of HEP/Ed previously given. Treatment Plan:  [x] Therapeutic Exercise   05650  [] Iontophoresis: 4 mg/mL Dexamethasone Sodium Phosphate  mAmin  20713   [] Therapeutic Activity  76904 [] Vasopneumatic cold with compression  71908    [] Gait Training   50671 [] Ultrasound   65023   [x] Neuromuscular Re-education  83148 [] Electrical Stimulation Unattended  01383   [x] Manual Therapy  58757 [] Electrical Stimulation Attended  25963   [x] Instruction in HEP  [] Lumbar/Cervical Traction  53615   [] Aquatic Therapy   34802 [] Cold/hotpack    [] Massage   76514      [] Dry Needling, 1 or 2 muscles  76096   [] Biofeedback, first 15 minutes   66977  [] Biofeedback, additional 15 minutes   63529 [] Dry Needling, 3 or more muscles  74503       []  Medication allergies reviewed for use of    Dexamethasone Sodium Phosphate 4mg/ml     with iontophoresis treatments. Pt is not allergic.     Frequency:  2-3 x/week for 12 visits    Todays Treatment:  Modalities:   Precautions:  Exercises:  Exercise Reps/ Time Weight/ Level Comments   SKTC 10x     LTR 15x     Tandem balance 10\" x 2 (B)     Feet together foam 10\" x 3           Other:    Specific Instructions for next treatment:    Treatment Charges: Mins Units   [x] Evaluation       []  Low       [x]  Moderate       []  High 30 1   []  Modalities     [x]  Ther Exercise 20 1   []  Manual Therapy     []  Ther Activities     []  Aquatics     []  Neuromuscular     []  Gait Training     []  Dry Needling           1-2 muscles     []  Dry Needling           3 or more          muscles     [] Vasocompression     []  Other 50 2       TOTAL TREATMENT TIME: 50    Time in:1230   Time Out:1330    Electronically signed by: Dayana Olsen PT    Physician Signature:________________________________Date:__________________  By signing above or cosigning this note, I have reviewed this plan of care and certify a need for medically necessary rehabilitation services.      *PLEASE SIGN ABOVE AND FAX BACK ALL PAGES*

## 2021-04-12 NOTE — CARE COORDINATION
ACM left VM for patient regarding multiple appointments and referrals:  Noted patient had PT today and scheduled his next appointment. He has an appointment with PCP on 4.14.21  Pain management on 4.20.21  Cardiology on 4.30.21  And GI and neurology were supposed to reach out to patient to schedule. ACM encouraged patient to call back with any concerns.

## 2021-04-13 ENCOUNTER — TELEPHONE (OUTPATIENT)
Dept: PAIN MANAGEMENT | Age: 81
End: 2021-04-13

## 2021-04-16 ENCOUNTER — HOSPITAL ENCOUNTER (OUTPATIENT)
Dept: PHYSICAL THERAPY | Age: 81
Setting detail: THERAPIES SERIES
Discharge: HOME | End: 2021-04-16
Payer: MEDICARE

## 2021-04-16 PROCEDURE — 97112 NEUROMUSCULAR REEDUCATION: CPT

## 2021-04-16 PROCEDURE — 97110 THERAPEUTIC EXERCISES: CPT

## 2021-04-16 NOTE — FLOWSHEET NOTE
[address] [facility]   [address]   Phone: [phone]   Fax: [phone]    Physical Therapy Daily Treatment Note      Date:  2021  Patient Name:  Chloe Slaughter    :  1940  MRN: [id number]  Physician: Kristen Galvan                         [facility] [id number] [facility] Med necessity  Medical Diagnosis: DDD M51.36; spinal stenosis M48.061, parkinsons disease             Rehab Codes: low back pain M54.5, difficulty walking  Onset date: referral 3/30/21                   Next 's appt.: PRN  Visit Count: 3/12                           Cancel/No Show: 0/    Subjective:    2021 Patient has no new complaints this session. Pt states no falls since last visit. Pain:  [x] Yes  [] No Location: generalized all over body Pain Rating: (0-10 scale) 5/10  Pain altered Tx:  [] No  [] Yes  Action:  Comments:    Objective:  Modalities:   Precautions: Fall Risk use gait belt, CGA   Exercises:  Exercise Reps/ Time Weight/ Level Completed  2021   Comments   DKTC 10\"x10  x    LTR 10\"x10  x    SLR 10x2  x    Bridge  10x2 3\"  x    Hip add squeeze  10x2 3\"      Marches  10x2  x 4/16 Supine this session. sidelying hip abd  10x2      sidelying clamshell 10x2      Seated LAQ 10x2      Standing rows/pull downs  10x2 Red   CGA    Big movement  10x/10x  x Step fwd, abd arms,step back, hands at side   Seated: scoop, to floor with trunk rotation and arms open. 1 person demonstrate exercises, 1 person as CGA    Other:    Specific Instructions for next treatment: R quad stretch      Assessment:     [x] Progressing toward goals. [] No change. [] Other:    [] Patient would continue to benefit from skilled physical therapy services in order to: improve balance, strength, function. 2021: Began tx with \"Big Movement\" exercises to address muscle/jt tightness, symptoms that could be resulting from Parkinson's.   Pt needed more rest breaks during big movement exercises d/t fatigue. VC/TC needed throughout session to correct pt's posture and execution of tasks with fair carry over of instruction. Tightness noted in R quad with SLR and DKTC during supine exercises. Pt is CGA with use of gait belt during standing big movement exercises and amb d/t unsteadiness resulting from pt's level of fatigue. STG: (to be met in 6 treatments)  1. ? Pain: Improved back pain to 3-4/10.  2. ? ROM:  Improved flexion to 1/2 distal from patella to ankles, extension improved to 10 degrees  3. ? Function: Improved functional tolerance of ambulation up to 50' safely in controlled environment without (A) device, able to do 6\" stair in clinic with UE (A), improved balance testing to 5 seconds for tandem balance. 4. Independent with Home Exercise Programs  5. Demonstrate Knowledge of fall prevention  LTG: (to be met in 12 treatments)  6. ? Pain: Improved back pain to 0-1/10.  7. ? ROM:  Improved flexion to 2/3rds distal from patella to ankles, extension improved to 15 degrees  8. ? Function: Improved functional tolerance of ambulation up to 150' safely in controlled environment without (A) device, able to do 8\" stair in clinic with UE (A), improved balance testing to 8 seconds for tandem balance, 5 seconds for SL balance  9. Independent with Home Exercise Programs  10. Demonstrate Knowledge of fall prevention  Patient goals:  Hope it will work    Pt. Education:  [x] Yes  [] No  [] Reviewed Prior HEP/Ed  Method of Education: [x] Verbal  [] Demo  [] Written  Comprehension of Education:  [x] Verbalizes understanding. [] Demonstrates understanding. [] Needs review. [] Demonstrates/verbalizes HEP/Ed previously given. Plan: [x] Continue per plan of care.    [] Other:      Treatment Charges: Mins Units   []  Modalities     [x]  Ther Exercise 15 1   []  Manual Therapy     []  Ther Activities     []  Aquatics     [x]  Neuromuscular 30 2   [] Vasocompression     [] Gait Training     [] Dry

## 2021-04-19 ENCOUNTER — TELEPHONE (OUTPATIENT)
Dept: PAIN MANAGEMENT | Age: 81
End: 2021-04-19

## 2021-04-19 ENCOUNTER — CARE COORDINATION (OUTPATIENT)
Dept: CARE COORDINATION | Age: 81
End: 2021-04-19

## 2021-04-19 NOTE — CARE COORDINATION
Ambulatory Care Coordination Note  4/19/2021  CM Risk Score: 5  Charlson 10 Year Mortality Risk Score: 100%     ACC: Christopher Triplett RN    Summary Note: Spoke with patient, appointment reminders given. Patient will not be able to make it to PT today, his appt is in 10 minutes. He was reminded about PT, Pain management appointments for this week. He took phone number to neurology and will call to schedule, [phone]. He asked if this is his new [place] doctor\". AC responded yes. [facility]   PCP would like him to get a smoke detector in the home, possibly through the local fire department. Patient declined assistance with transportation. Possibly other needs such as AOOA. [facility] will have  call patient on 4.20.21 in the AM to remind him of his new patient appt with pain management at 1 pm.         Care Coordination Interventions    Program Enrollment: Complex Care  Referral from Primary Care Provider: Yes  Suggested Interventions and Community Resources  Disease Association: Completed (Comment: REFERRALS: CARDIOLOGY, GI, PAIN MANAGEMENT, NEUROLOGY )  Physical Therapy: Completed  Transportation Support: Declined  Zone Management Tools: In Process         Goals Addressed                 This Visit's Progress     Conditions and Symptoms   No change     I will schedule office visits, as directed by my provider. I will keep my appointment or reschedule if I have to cancel. I will notify my provider of any barriers to my plan of care. I will follow my Zone Management tool to seek urgent or emergent care. I will notify my provider of any symptoms that indicate a worsening of my condition.     Barriers: overwhelmed by complexity of regimen  Plan for overcoming my barriers: WORK WITH ac  Confidence: 7/10  Anticipated Goal Completion Date: 7.5.21                Prior to Admission medications    Medication Sig Start Date End Date Walt   5/5/2021  2:15 PM Felix Romero PTA [facility] PT [facility]   5/25/2021  3:40 PM [facility], MD [facility] Renal Se   6/14/2021  2:00  [facility]      and   General Assessment    Do you have any symptoms that are causing concern?: Yes  Progression since Onset: Unchanged  Reported Symptoms: Pain

## 2021-04-20 ENCOUNTER — HOSPITAL ENCOUNTER (OUTPATIENT)
Dept: PAIN MANAGEMENT | Age: 81
Discharge: HOME | End: 2021-04-20
Payer: MEDICARE

## 2021-04-20 ENCOUNTER — CARE COORDINATION (OUTPATIENT)
Dept: CARE COORDINATION | Age: 81
End: 2021-04-20

## 2021-04-20 VITALS
SYSTOLIC BLOOD PRESSURE: 122 MMHG | HEART RATE: 80 BPM | BODY MASS INDEX: 27.15 KG/M2 | WEIGHT: 173 LBS | TEMPERATURE: 98.2 F | RESPIRATION RATE: 12 BRPM | OXYGEN SATURATION: 94 % | DIASTOLIC BLOOD PRESSURE: 81 MMHG | HEIGHT: 67 IN

## 2021-04-20 DIAGNOSIS — M51.36 DDD (DEGENERATIVE DISC DISEASE), LUMBAR: ICD-10-CM

## 2021-04-20 DIAGNOSIS — M16.0 PRIMARY OSTEOARTHRITIS OF BOTH HIPS: ICD-10-CM

## 2021-04-20 DIAGNOSIS — M47.816 LUMBAR FACET ARTHROPATHY: ICD-10-CM

## 2021-04-20 DIAGNOSIS — M54.16 LUMBAR RADICULOPATHY: Primary | ICD-10-CM

## 2021-04-20 DIAGNOSIS — M47.817 LUMBOSACRAL SPONDYLOSIS WITHOUT MYELOPATHY: ICD-10-CM

## 2021-04-20 PROCEDURE — 80307 DRUG TEST PRSMV CHEM ANLYZR: CPT

## 2021-04-20 PROCEDURE — 99204 OFFICE O/P NEW MOD 45 MIN: CPT | Performed by: PAIN MEDICINE

## 2021-04-20 PROCEDURE — 99203 OFFICE O/P NEW LOW 30 MIN: CPT

## 2021-04-20 ASSESSMENT — ENCOUNTER SYMPTOMS
BOWEL INCONTINENCE: 0
ALLERGIC/IMMUNOLOGIC NEGATIVE: 1
RESPIRATORY NEGATIVE: 1
EYES NEGATIVE: 1
BACK PAIN: 1
GASTROINTESTINAL NEGATIVE: 1

## 2021-04-20 ASSESSMENT — PAIN DESCRIPTION - PROGRESSION: CLINICAL_PROGRESSION: GRADUALLY WORSENING

## 2021-04-20 ASSESSMENT — PAIN DESCRIPTION - FREQUENCY: FREQUENCY: CONTINUOUS

## 2021-04-20 NOTE — PROGRESS NOTES
[facility] Pain Management  Patient Pain Assessment  Consultation - Dr. Clayton Seip    Primary Care Physician: JW Arciniega CNP    Chief complaint:   Chief Complaint   Patient presents with    Back Pain   . Delfino Recinos is a 79 y.o. male evaluated on 4/20/2021. Patient is referred by JW Arciniega CNP      Patient identification was verified at the start of the visit: Yes    Chief complaint: Delfino Recinos is 79 y.o.,  male, with  Back Pain  . HISTORY OF PRESENT ILLNESS:  Delfino Recinos is 79 y.o. male with    Referring   Diagnosis  M51.36 (ICD-10-CM) - DDD (degenerative disc disease), lumbar  M48.061 (ICD-10-CM) - Spinal stenosis of lumbar region without neurogenic claudication    Patient is an 26-year-old male referred to the pain clinic in consultation for back pain of longstanding duration. Patient reports in 2011 he fell off 30 feet height and was in coma for 7 days and since then had back pain off and on. Reports has been seen by several physicians but the nothing was done so far. His pain has been gradually getting worse with pain radiating to both lower extremities. He reports he started physical therapy about 2 weeks ago. Patient also reports he has Parkinson's disease and is being treated for it. Back Pain  This is a chronic problem. The current episode started more than 1 year ago. The problem occurs constantly. The problem is unchanged. The pain is present in the lumbar spine and sacro-iliac. The quality of the pain is described as aching, burning and stabbing (pressure, throbbing, sharp). The pain radiates to the right foot and left foot. The pain is at a severity of 5/10. The pain is moderate. Associated symptoms include headaches, numbness, paresthesias, tingling and weakness. Pertinent negatives include no bladder incontinence or bowel incontinence. (Mostly burning sensation) Risk factors include lack of exercise.        [place] Monitoring 4/20/2021   Periodic Controlled Substance Monitoring No signs of potential drug abuse or diversion identified. ;Assessed functional status. Vitals:    04/20/21 1315   BP: 122/81   Pulse: 80   Resp: 12   Temp: 98.2 °F (36.8 °C)   SpO2: 94%     Current Pain Assessment  Pain Assessment  Pain Assessment: 0-10  Pain Level: 5  Patient's Stated Pain Goal: 2(TO DECREASE PAIN WITH INCREASED ACTIVITY)  Pain Type: Chronic pain  Pain Location: Back, Leg, Buttocks, Hip  Pain Orientation: Right, Left, Lower  Pain Radiating Towards: BILATERAL LEGS  Pain Descriptors: Aching, Burning, Pressure, Throbbing, Sharp, Shooting, Sore  Pain Frequency: Continuous  Pain Onset: On-going  Clinical Progression: Gradually worsening  Functional Pain Assessment: Prevents or interferes some active activities and ADLs  Non-Pharmaceutical Pain Intervention(s): Shower, Rest         ADVERSE MEDICATION EFFECTS:   Constipation: no  Bowel Regimen: Yes  Diet: common adult  Appetite:  ok  Sedation:  no  Urinary Retention: no    FOCUSED PAIN SCALE:  Highest : 8  Lowest :3  Average: Range-5  When and What  was your last procedure:     NA  Was your procedure effective:  not applicable    ACTIVITY/SOCIAL/EMOTIONAL:  Sleep Pattern: 5 hours per night. difficulty falling asleep, nightime awakenings and difficulty falling back asleep if awakened  Energy Level: Normal  Currently attending Physical Therapy:  Yes  Home Exercises: intermittently STRETCHING AT HOME  Mobility: Pailful to walk  Do you use assistive devices?  Yes, cane  Have you fallen in the last 30 days?:  No  Currently seeing a Psychiatrist or Psychologist:  no  Emotional Issues: none   Mood: appropriate     ABERRANT BEHAVIORS SINCE LAST VISIT:  Have you ever been treated in another Pain Clinic no  Refills for prescriptions appropriate: not applicable  Lost Rx/pills: not applicable  Taking more medication than prescribed:  no  Are you receiving PAIN medications from  other doctors: no  Last tablet by mouth once daily 90 tablet 3    doxepin (SINEQUAN) 25 MG capsule Take by mouth      ticagrelor (BRILINTA) 90 MG TABS tablet Take by mouth      magnesium gluconate (MAGONATE) 500 MG tablet Take 500 mg by mouth 2 times daily      acetaminophen (TYLENOL 8 HOUR ARTHRITIS PAIN) 650 MG extended release tablet Take 1 tablet by mouth every 8 hours as needed for Pain 60 tablet 2    clopidogrel (PLAVIX) 75 MG tablet Take 1 tablet by mouth daily 90 tablet 1    metoprolol tartrate (LOPRESSOR) 25 MG tablet 1/2 tablet by mouth twice a day for hypertension 45 tablet 1    carbidopa-levodopa (SINEMET)  MG per tablet       nitroGLYCERIN (NITROSTAT) 0.4 MG SL tablet Place 1 tablet under the tongue every 5 minutes as needed for Chest pain If no relief of chest pain after 3rd dose, go to the ER or call 911 25 tablet 0     No current facility-administered medications for this encounter. Allergies  Dye [iodides] and Aspirin    Family History  family history is not on file. Social History  Social History     Socioeconomic History    Marital status: Single     Spouse name: None    Number of children: None    Years of education: None    Highest education level: None   Occupational History    None   Social Needs    Financial resource strain: Not very hard    Food insecurity     Worry: Never true     Inability: Never true    Transportation needs     Medical: Patient refused     Non-medical: Patient refused   Tobacco Use    Smoking status: Former Smoker    Smokeless tobacco: Never Used   Substance and Sexual Activity    Alcohol use: No    Drug use: No    Sexual activity: Not Currently   Lifestyle    Physical activity     Days per week: 2 days     Minutes per session: 20 min    Stress:  Only a little   Relationships    Social connections     Talks on phone: None     Gets together: None     Attends [employer] service: None     Active member of club or organization: None     Attends meetings of clubs or organizations: None     Relationship status: None    Intimate partner violence     Fear of current or ex partner: None     Emotionally abused: None     Physically abused: None     Forced sexual activity: None   Other Topics Concern    None   Social History Narrative    None      reports no history of drug use. REVIEW OF SYSTEMS:      Review of Systems   Constitutional: Negative. HENT: Negative. Eyes: Negative. Respiratory: Negative. Cardiovascular: Negative. Gastrointestinal: Negative. Negative for bowel incontinence. Endocrine: Negative. Genitourinary: Negative. Negative for bladder incontinence. Musculoskeletal: Positive for back pain. Skin: Negative. Allergic/Immunologic: Negative. Neurological: Positive for tingling, tremors, weakness, numbness, headaches and paresthesias. History of Parkinson's disease   Hematological: Negative. Psychiatric/Behavioral: Negative. GENERAL PHYSICAL EXAM:  Vitals: /81   Pulse 80   Temp 98.2 °F (36.8 °C) (Oral)   Resp 12   Ht 5' 7\" (1.702 m)   Wt 173 lb (78.5 kg)   SpO2 94%   BMI 27.10 kg/m² , Body mass index is 27.1 kg/m². Physical Exam  Constitutional:       Appearance: Normal appearance. HENT:      Head: Normocephalic. Nose: Nose normal.   Eyes:      Extraocular Movements: Extraocular movements intact. Conjunctiva/sclera: Conjunctivae normal.      Pupils: Pupils are equal, round, and reactive to light. Neck:      Musculoskeletal: Neck supple. No neck rigidity. Cardiovascular:      Rate and Rhythm: Normal rate and regular rhythm. Pulses: Normal pulses. Pulmonary:      Effort: Pulmonary effort is normal.   Abdominal:      Palpations: Abdomen is soft. Tenderness: There is no abdominal tenderness. Musculoskeletal:      Right lower leg: No edema. Left lower leg: No edema. Lymphadenopathy:      Cervical: No cervical adenopathy. Skin:     Findings: No erythema or rash. Neurological:      Mental Status: He is alert and oriented to person, place, and time. Cranial Nerves: No cranial nerve deficit. Sensory: No sensory deficit. Motor: No weakness. Psychiatric:         Mood and Affect: Mood normal.         Behavior: Behavior normal.         Thought Content: Thought content normal.         Judgment: Judgment normal.      Right Ankle Exam     Muscle Strength   The patient has normal right ankle strength. Left Ankle Exam     Muscle Strength   The patient has normal left ankle strength. Right Knee Exam     Muscle Strength   The patient has normal right knee strength. Left Knee Exam     Muscle Strength   The patient has normal left knee strength. Right Hip Exam     Muscle Strength   The patient has normal right hip strength. Left Hip Exam     Muscle Strength   The patient has normal left hip strength. Back Exam     Tenderness   The patient is experiencing tenderness in the lumbar and sacroiliac. Range of Motion   Back extension: Limited and painful. Back flexion: Limited and painful. Back lateral bend right: Limited and painful. Back lateral bend left: Limited and painful. Back rotation right: Limited and painful. Back rotation left: Limited and painful.      Tests   Straight leg raise right: positive  Straight leg raise left: positive    Other   Sensation: normal  Gait: antalgic   Erythema: no back redness  Scars: absent    Comments:  Skin --normal appearance  Surgical Scar --Absent   kyphosis absent and scoliosis absent  Alignment of her shoulders, scapulae and iliac crests--symmetric  Paraspinal tenderness:Present  Lumbar lordosis-----------Decreased  Movements of the lumbar spine indicated above are diminished range with pain   Facet loading---  : Right side--    Pain-Moderate                                   Left side----   Pain  Moderate  Aleksandr's test -------------- Right side---positive Left side-----positive              Nurses Notes and Vital Signs reviewed. DATA  Labs:     7/20/2020  2:59 PM - Nathaniel, [facility] Incoming Lab Results From [facility]    Component Value Ref Range & Units Status Collected Lab   Glucose 102High   70 - 99 mg/dL Final 07/20/2020  1:28  [address]   BUN 13  8 - 23 mg/dL Final 07/20/2020  1:28  [address]   0.70 - 1.20 mg/dL Final 07/20/2020  1:28  [address]   Bun/Cre Ratio NOT REPORTED  9 - 20 Final 07/20/2020  1:28  [address]   Calcium 9.8  8.6 - 10.4 mg/dL Final 07/20/2020  1:28  [address]   Sodium 140  135 - 144 mmol/L Final 07/20/2020  1:28  [address]   Potassium 4.5  3.7 - 5.3 mmol/L Final 07/20/2020  1:28  [address]   Chloride 103  98 - 107 mmol/L Final 07/20/2020  1:28  [address]   CO2 23  20 - 31 mmol/L Final 07/20/2020  1:28  [address]   Anion Gap 14  9 - 17 mmol/L Final 07/20/2020  1:28  [address]   Alkaline Phosphatase 90  40 - 129 U/L Final 07/20/2020  1:28  [address]   ALT 15  5 - 41 U/L Final 07/20/2020  1:28  [address]   AST 20  <40 U/L Final 07/20/2020  1:28  [address]   Total Bilirubin 0.56  0.3 - 1.2 mg/dL Final 07/20/2020  1:28  [address]   Total Protein 7.6  6.4 - 8.3 g/dL Final 07/20/2020  1:28  [address]   Albumin 4.7  3.5 - 5.2 g/dL Final 07/20/2020  1:28  [address]   Albumin/Globulin Ratio 1.6  1.0 - 2.5 Final 07/20/2020  1:28  [address]   GFR Non- 53Low   >60 mL/min Final 07/20/2020  1:28  [address]   GFR African American >60  >60 mL/min Final 07/20/2020  1:28  [facility]   GFR Comment        Final 07/20/2020  1:28 PM      ` Imaging:  Radiology Images and Reports reviewed where indicated and necessary  CT scan dated 9 July 2013       HISTORY:   ORDERING SYSTEM PROVIDED HISTORY: pain, fall   TECHNOLOGIST PROVIDED HISTORY:   pain, fall   Reason for Exam: fall.       FINDINGS:   BONES/ALIGNMENT: There is no acute fracture or traumatic malalignment.       DEGENERATIVE CHANGES: Degenerate changes are seen throughout the cervical   spine.  Mild degrees of acquired central spinal stenosis again noted at the   C3-C4, C4-C5, and C6-C7 levels.  Osseous fusion across the C5-6 disc level is   again noted.       SOFT TISSUES: There is no prevertebral soft tissue swelling.           Impression   No evidence of an acute fracture, or traumatic malalignment involving the   cervical spine.         EXAMINATION:   CT OF THE LUMBAR SPINE WITHOUT CONTRAST  3/14/2020       TECHNIQUE:   CT of the lumbar spine was performed without the administration of   intravenous contrast. Multiplanar reformatted images are provided for review. Dose modulation, iterative reconstruction, and/or weight based adjustment of   the mA/kV was utilized to reduce the radiation dose to as low as reasonably   achievable.       COMPARISON:   03/18/2011       HISTORY:   ORDERING SYSTEM PROVIDED HISTORY: pain   TECHNOLOGIST PROVIDED HISTORY:   pain   Reason for Exam: pain; pain rle       FINDINGS:   BONES/ALIGNMENT: There is 5 mm of retrolisthesis of L1 on L2 and 6 mm of   anterolisthesis of L4 on L5 (previously 3 and 4 mm respectively) with   resulting moderate central canal stenosis, AP canal estimated at 6 mm in AP   dimension. .  The vertebral body heights are maintained. No osseous   destructive lesion is seen.       DEGENERATIVE CHANGES: Multilevel DDD and facet arthrosis.       SOFT TISSUES/RETROPERITONEUM: No paraspinal mass is seen.           Impression   Degenerative changes and anterolisthesis as described with resulting central   canal stenosis at L4-5.        Patient Active Problem List   Diagnosis    Temporary loss of eyesight    Impaired renal function    Syncope : posterior circulation stroke vs Neurocardiogenic syncope     Intracranial bleed : traumatic left sub-dural hematoma ,managed conservatively in 2011    Headache    CKD (chronic kidney disease) stage 3, GFR 30-59 ml/min    Depression    Hyperlipidemia    Microhematuria    Microalbuminuria    Essential hypertension    Generalized abdominal pain    Tubular adenoma of colon    Diverticulosis of colon    History of skull fracture    NSTEMI (non-ST elevated myocardial infarction) ([facility])    Nausea    Pure hypercholesterolemia    Prediabetes    Parkinson disease ([facility])    Chronic post-traumatic headache, not intractable    Fall (on) (from) other stairs and steps, initial encounter    Strain of iliopsoas muscle, initial encounter    Acute pain of left knee    Closed fracture of second toe of right foot    Closed fracture of second toe of left foot    Abnormal ECG    Cerebrovascular accident ([address]    Chest pain, unspecified    Hematoma of scalp    Left ventricular hypertrophy    Mild aortic valve regurgitation    Pulmonary hypertension, mild ([address]        ASSESSMENT    Betsy Hyman is a 79 y.o. male with     1. Lumbar radiculopathy    2. DDD (degenerative disc disease), lumbar    3. Lumbosacral spondylosis without myelopathy    4. Lumbar facet arthropathy    5. Primary osteoarthritis of both hips           PLAN  Patient's   [] x-ray    [x] CT scan    [] MRI  Were/was  Reviewed. These findings are consistent with the patient's symptoms and physical examination.       [x] Patient's findings on the x-ray were explained to the patient using a bone modal.    Other reports reviewed include    [] Bone scan   [] EMG and nerve conduction studies   [x] Referral reports-  I also discussed with him the following treatment options Including advantages and disadvantages of each:    [x] Physical therapy    [x]

## 2021-04-20 NOTE — CARE COORDINATION
Patient was referred to  by Duane Frausto who stated that patients PCP  wants him to have a smoke detector in his home and to contact the local fire department. It   was also stated that the patient might need other social resources. HC attempted to contact   this patient, there was no answer. HC left a message for patient stating that she will attempt   reach him on tomorrow, 04/21/21.       Plan of Care  [facility] [place]. will attempt to reach patient on 04/21/21

## 2021-04-20 NOTE — CARE COORDINATION
appt reminder for pain management appt at 1 pm. Pt is on his way there now.       [facility] appts/Provider:    Future Appointments   Date Time Provider Shireen Griffithi   4/20/2021  1:00 PM Bennett Black MD [address]   4/22/2021 10:30 AM Pat Chime, PTA [facility]   4/27/2021  2:15 PM Summer Pipes, PTA [facility]   4/29/2021  2:00 PM Pat Chime, PTA [facility]   5/4/2021  2:15 PM Summer Pipes, PTA [facility]   5/5/2021  2:15 PM Summer Pipes, PTA [facility]   5/25/2021  3:40 PM Austin Meraz MD [facility]   6/14/2021  2:00 PM Dhara Balderrama, [address] [place] [address]

## 2021-04-21 ENCOUNTER — CARE COORDINATION (OUTPATIENT)
Dept: CARE COORDINATION | Age: 81
End: 2021-04-21

## 2021-04-21 NOTE — CARE COORDINATION
HC phoned and spoke to the patient this morning. Patient stated that he was feeling so, so. He  reported that he was still in bed, [facility]. inquired if patient  would prefer that [facility]. call later. Patient that he would appreciate it if he could get a call a little later today. HC attempted to   phone the patient and found that his mailbox was full. HC also phoned Ben Ram[facility]rez[employer] for   food resources, and had that prepared for him.     Plan of Care  [facility] will reach out to patient at another time

## 2021-04-28 ENCOUNTER — CARE COORDINATION (OUTPATIENT)
Dept: CARE COORDINATION | Age: 81
End: 2021-04-28

## 2021-04-28 ENCOUNTER — OFFICE VISIT (OUTPATIENT)
Dept: NEUROLOGY | Age: 81
End: 2021-04-28
Payer: MEDICARE

## 2021-04-28 VITALS
WEIGHT: 173 LBS | HEIGHT: 67 IN | DIASTOLIC BLOOD PRESSURE: 80 MMHG | OXYGEN SATURATION: 97 % | SYSTOLIC BLOOD PRESSURE: 139 MMHG | HEART RATE: 73 BPM | BODY MASS INDEX: 27.15 KG/M2

## 2021-04-28 DIAGNOSIS — G44.329 CHRONIC POST-TRAUMATIC HEADACHE, NOT INTRACTABLE: Primary | ICD-10-CM

## 2021-04-28 DIAGNOSIS — Z87.81 HISTORY OF SKULL FRACTURE: ICD-10-CM

## 2021-04-28 DIAGNOSIS — S00.03XS HEMATOMA OF SCALP, SEQUELA: ICD-10-CM

## 2021-04-28 DIAGNOSIS — Z87.820 H/O TRAUMATIC BRAIN INJURY: ICD-10-CM

## 2021-04-28 DIAGNOSIS — G20 PARKINSON DISEASE (HCC): ICD-10-CM

## 2021-04-28 LAB
6-ACETYLMORPHINE, UR: NOT DETECTED
7-AMINOCLONAZEPAM, URINE: NOT DETECTED
ALPHA-OH-ALPRAZ, URINE: NOT DETECTED
ALPHA-OH-MIDAZOLAM, URINE: NOT DETECTED
ALPRAZOLAM, URINE: NOT DETECTED
AMPHETAMINES, URINE: NOT DETECTED
BARBITURATES, URINE: PRESENT
BENZOYLECGONINE, UR: NOT DETECTED
BUPRENORPHINE URINE: NOT DETECTED
CARISOPRODOL, UR: NOT DETECTED
CLONAZEPAM, URINE: NOT DETECTED
CODEINE, URINE: NOT DETECTED
CREATININE URINE: 119.9 MG/DL (ref 20–400)
DIAZEPAM, URINE: NOT DETECTED
DRUGS EXPECTED, UR: NORMAL
EER HI RES INTERP UR: NORMAL
ETHYL GLUCURONIDE UR: NOT DETECTED
FENTANYL URINE: NOT DETECTED
GABAPENTIN: NOT DETECTED
HYDROCODONE, URINE: NOT DETECTED
HYDROMORPHONE, URINE: NOT DETECTED
LORAZEPAM, URINE: NOT DETECTED
MARIJUANA METAB, UR: NOT DETECTED
MDA, UR: NOT DETECTED
MDEA, EVE, UR: NOT DETECTED
MDMA URINE: NOT DETECTED
MEPERIDINE METAB, UR: NOT DETECTED
METHADONE, URINE: NOT DETECTED
METHAMPHETAMINE, URINE: NOT DETECTED
METHYLPHENIDATE: NOT DETECTED
MIDAZOLAM, URINE: NOT DETECTED
MORPHINE URINE: NOT DETECTED
NALOXONE URINE: NOT DETECTED
NORBUPRENORPHINE, URINE: NOT DETECTED
NORDIAZEPAM, URINE: NOT DETECTED
NORFENTANYL, URINE: NOT DETECTED
NORHYDROCODONE, URINE: NOT DETECTED
NOROXYCODONE, URINE: NOT DETECTED
NOROXYMORPHONE, URINE: NOT DETECTED
OXAZEPAM, URINE: NOT DETECTED
OXYCODONE URINE: NOT DETECTED
OXYMORPHONE, URINE: NOT DETECTED
PAIN MANAGEMENT DRUG PANEL INTERP, URINE: NORMAL
PAIN MGT DRUG PANEL, HI RES, UR: NORMAL
PCP,URINE: NOT DETECTED
PHENTERMINE, UR: NOT DETECTED
PREGABALIN: NOT DETECTED
TAPENTADOL, URINE: NOT DETECTED
TAPENTADOL-O-SULFATE, URINE: NOT DETECTED
TEMAZEPAM, URINE: NOT DETECTED
TRAMADOL, URINE: NOT DETECTED
ZOLPIDEM METABOLITE (ZCA), URINE: NOT DETECTED
ZOLPIDEM, URINE: NOT DETECTED

## 2021-04-28 PROCEDURE — 99204 OFFICE O/P NEW MOD 45 MIN: CPT | Performed by: STUDENT IN AN ORGANIZED HEALTH CARE EDUCATION/TRAINING PROGRAM

## 2021-04-28 RX ORDER — GABAPENTIN 300 MG/1
300 CAPSULE ORAL NIGHTLY
Qty: 30 CAPSULE | Refills: 2 | Status: SHIPPED | OUTPATIENT
Start: 2021-05-28 | End: 2021-06-14

## 2021-04-28 RX ORDER — GABAPENTIN 100 MG/1
CAPSULE ORAL
Qty: 45 CAPSULE | Refills: 0 | Status: SHIPPED | OUTPATIENT
Start: 2021-04-28 | End: 2021-06-14

## 2021-04-28 RX ORDER — BUTALBITAL, ACETAMINOPHEN AND CAFFEINE 50; 325; 40 MG/1; MG/1; MG/1
TABLET ORAL
Qty: 28 TABLET | Refills: 3 | Status: SHIPPED | OUTPATIENT
Start: 2021-04-28 | End: 2021-06-14 | Stop reason: SDUPTHER

## 2021-04-28 RX ORDER — MAGNESIUM OXIDE 400 MG/1
400 TABLET ORAL DAILY
Qty: 30 TABLET | Refills: 3 | Status: SHIPPED | OUTPATIENT
Start: 2021-04-28 | End: 2021-05-05 | Stop reason: SDUPTHER

## 2021-04-28 ASSESSMENT — ENCOUNTER SYMPTOMS
VOMITING: 0
DIARRHEA: 0
EYE PAIN: 0
COUGH: 0
ABDOMINAL PAIN: 0
NAUSEA: 0
PHOTOPHOBIA: 1
SHORTNESS OF BREATH: 0
BACK PAIN: 1
CONSTIPATION: 0

## 2021-04-28 NOTE — PATIENT INSTRUCTIONS
For Headache Prevention: We will give a trial of magnesium oxide 400 mg daily for headache prophylaxis. Take 2 hours before sleep    Will give a trial of Gabapentin 300 mg at bedtime. Week 1 100 mg QHS  Week 2 200 mg QHS  Week 3 300 mg QHS. Discussed possible side effects of sedation, drowsiness and dizziness, ataxia with the patient. Instructed patient to call the clinic if develop any of the possible side effects. Started patient not to drive after taking the medication     Remember that preventative medications usually take at least 4-6 weeks to be effective in reducing the frequency of your headaches. - For severe headache    Fioricet  butalbital-acetaminophen-caffeine (FIORICET) -40 MG per tablet  Take 1 tab only for severe headache. Can repeat after 4 hrs if needed  Max 2 tabs/24 hrs. Max 4 tabs/week        - If acute medications fail, including the rescue medication, and you still have a bad headache, please call the clinic for further treatment options, including possible nerve blocks. - Maintain Headache diary to see if there is a pattern to your headaches. - Discussed headache triggers with patients and instructed to avoid them. Non pharmacological preventive strategies for headaches:   Wear sunglasses when in bright light.  Avoid loud and continuously noisy surroundings   avoid flashy lights   Keep yourself hydrated (8 to 10 glasses of water)   ensure 3 to 4 meals at regular times a day   look for and avoid food related triggers (chocolate vimal, etc.)   maintain good sleep hygiene, regular 8 to 10 hours of sleep   avoid staying up late, late night partying.

## 2021-04-28 NOTE — CARE COORDINATION
Attempting to reach patient for a follow up care coordination call regarding any needs, questions or concerns. Myrna Spangler [facility] [phone]  VM is full. Noted patient did go to his neurology appointment today.

## 2021-04-28 NOTE — PROGRESS NOTES
abortive meds: Tylenol  Current prophylactic meds:none  Previous abortive medications tried: Unknown  Previous prophylactic medications tried: None      Patient follows up with a neurologist Dr. Alberta Dickey for the Parkinson's disease, currently taking Sinemet for that.                                                                        PATIENT HISTORY:     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 ([facility] [address] 3/18/2011    d/t being hurt on the job    CKD (chronic kidney disease) stage 2, GFR 60-89 ml/min     CKD (chronic kidney disease) stage 3, GFR 30-59 ml/min     Diverticulosis of colon     GERD (gastroesophageal reflux disease)     Impaired renal function     MRSA (methicillin resistant staph aureus) culture positive 9/23/2015    leg    Osteoarthritis of knee     Left    Proteinuria     Skull fracture ([facility] [address] 3/18/2011    d/t 12-foot drop on the job    Temporary loss of eyesight     periodically, happened x7    Tubular adenoma of colon 2012, 2017    mulitple        Past Surgical History:   Procedure Laterality Date    COLONOSCOPY  11/02/2012    poor prep, tubular adenoma    COLONOSCOPY  09/19/2017    diverticulosis, multiple polyps as described, spot marking done, pathology-tubular adenoma x 4    MI COLSC FLX W/RMVL OF TUMOR POLYP LESION SNARE TQ N/A 9/19/2017    COLONOSCOPY POLYPECTOMY SNARE/COLD BIOPSY with tatooing and apc transverse colon performed by Ana Constantino MD at [address]     rotator cuff        Social History     Socioeconomic History    Marital status: Single     Spouse name: Not on file    Number of children: Not on file    Years of education: Not on file    Highest education level: Not on file   Occupational History    Not on file   Social Needs    Financial resource strain: Not very hard    Food insecurity     Worry: Never true     Inability: Never true    Transportation needs     Medical: Patient Clear to auscultation bilaterally   Cardiovascular: normal rate, regular rhythm  Abdomen: Soft, nontender, nondistended  Extremities:  peripheral pulses palpable, no pedal edema  Psych: normal affect      NEUROLOGICAL EXAMINATION:     Mental status   Alert and oriented; intact memory with no confusion, speech or language problems; no hallucinations or delusions     Cranial nerves   II - visual fields intact to confrontation                                                III, IV, VI  extra-ocular muscles full: no pupillary defect; no MAIRA, no nystagmus, no ptosis   V - normal facial sensation                                                               VII - normal facial symmetry                                                             VIII - intact hearing                                                                             IX, X - symmetrical palate                                                                  XI - symmetrical shoulder shrug                                                       XII - midline tongue without atrophy or fasciculation     Motor function   increased muscle rigidity noted in bilateral upper and lower extremity  Muscle strength: normal power 5/5 in bilateral upper extremities  Bilateral lower extremities 4+/5     Sensory function Intact to light touch in bilateral upper and lower extremities. Cerebellar  resting tremors noted in bilateral upper extremities mainly involving the fingers in the hand, worse on the left side as compared to the right side. Reflex function Intact 2+ DTR and symmetric. Negative Babinski     Gait                   patient has difficulty getting of the chair.   Stooped posture  Short steps, shuffling gait  Took multiple steps to turn around  Pull test negative           PRIOR TESTS AND IMAGING: Following images and Labs were reviewed by the examiner       CT head without contrast 3/20/2020-done for a fall  Impression   No acute intracranial abnormality.       Small left frontal scalp hematoma       CT cervical spine without contrast 3/2/2020  FINDINGS:   BONES/ALIGNMENT: There is no acute fracture or traumatic malalignment.       DEGENERATIVE CHANGES: Degenerate changes are seen throughout the cervical   spine.  Mild degrees of acquired central spinal stenosis again noted at the   C3-C4, C4-C5, and C6-C7 levels.  Osseous fusion across the C5-6 disc level is   again noted.       SOFT TISSUES: There is no prevertebral soft tissue swelling.           Impression   No evidence of an acute fracture, or traumatic malalignment involving the   cervical spine. MRI Brain: Without contrast 5/2/2019  Impression   Cerebral atrophy.  Mild chronic small vessel ischemic changes.  No acute   intracranial abnormality. MRI orbit face neck with and without contrast 3/13/2019  FINDINGS:       MRI BRAIN:       Face: Limited intracranial evaluation demonstrates no midline shift. Visualized flow voids are patent.  Punctate increased T2 signal in the left   cerebellar hemisphere is noted, likely due to a tiny old infarct.  Limited   evaluation of the orbits demonstrates no focal abnormality.       SINUSES: Minimal mucosal thickening is noted in the inferior maxillary   sinuses, right greater than left.  Mastoid air cells are clear       BONES/SOFT TISSUES: No focal soft tissue mass or fluid collection is noted.    There is no muscular or soft tissue edema.  There is no abnormal signal or   enhancement in the mandible or maxilla.  Anterior facial tissues are limited   due to artifact.           MRI ORBITS:       ORBITS: There is no orbital mass or abnormal signal. Ephriam Albright is no orbital   abnormal enhancement.  There is no proptotic change.           Impression   There is no findings diagnostic of an abscess at this time. Ephriam Albright is no   focal mass.  Detail in the anterior facial tissues is slightly limited due to   artifact.  If concern continues, consider postcontrast CT exam.  CT exam in   this region usually demonstrates less artifact. ASSESSMENT / PLAN:       Nixon Pickard is a 79 y.o. right handed  male was seen in the clinic for headaches     Posttraumatic headaches   Postconcussion syndrome  · History of traumatic brain injury in March 2011, patient fell off the ladder and had a skull fracture along with subdural hematoma that was treated conservatively. · History of cervical spondylosis with cervical fusion at C5-C6   History of brain hemorrhage in 2011   H/o Parkinsons diseases was on Sinemet on presentation. Patient follows up with a neurologist Dr. Payton Drivers for the Parkinson's disease, currently taking Sinemet for that   Chronic kidney disease   Multiple tubular adenoma of colon   Colon diverticulosis   Former smoker    Patient is allergic to aspirin    Plan        - For Headache Prevention: We will give a trial of magnesium oxide 400 mg daily for headache prophylaxis    Will give a trial of Gabapentin 300 mg at bedtime. Week 1 100 mg QHS  Week 2 200 mg QHS  Week 3 300 mg QHS. Discussed possible side effects of sedation, drowsiness and dizziness, ataxia with the patient. Instructed patient to call the clinic if develop any of the possible side effects. Started patient not to drive after taking the medication     Remember that preventative medications usually take at least 4-6 weeks to be effective in reducing the frequency of your headaches. - For severe headache    Fioricet  butalbital-acetaminophen-caffeine (FIORICET) -40 MG per tablet  Take 1 tab only for severe headache. Can repeat after 4 hrs if needed  Max 2 tabs/24 hrs. Max 4 tabs/week        - If acute medications fail, including the rescue medication, and you still have a bad headache, please call the clinic for further treatment options, including possible nerve blocks. - Maintain Headache diary to see if there is a pattern to your headaches.   - Discussed headache triggers with patients and instructed to avoid them. Non pharmacological preventive strategies for headaches:   Wear sunglasses when in bright light.  Avoid loud and continuously noisy surroundings   avoid flashy lights   Keep yourself hydrated (8 to 10 glasses of water)   ensure 3 to 4 meals at regular times a day   look for and avoid food related triggers (chocolate vimal, etc.)   maintain good sleep hygiene, regular 8 to 10 hours of sleep   avoid staying up late, late night partying.    -We will try to obtain the medical records from Dr. Kam Bajwa  - Follow up in the clinic in 6 weeks. - Instructed patient to call the clinic if symptoms worsen or develop any new symptoms.          Electronically signed by Vikki Arce MD on 4/28/2021 at 12:36 PM

## 2021-04-28 NOTE — CARE COORDINATION
HC attempted again to reach patient. Patient didn't answer, his mailbox is full, unable to leave a message.       Plan of Care  [facility]. will attempt to reach patient again soon

## 2021-05-03 ENCOUNTER — CARE COORDINATION (OUTPATIENT)
Dept: CARE COORDINATION | Age: 81
End: 2021-05-03

## 2021-05-03 NOTE — CARE COORDINATION
HC attempted to contact patient again. There was no answer. HC left a message for patient and requested that he return the  call. HC provided contact information for a return call.     Plan of Care  [facility]. will attempt to follow up with patient if no return call within a week

## 2021-05-05 ENCOUNTER — CARE COORDINATION (OUTPATIENT)
Dept: CARE COORDINATION | Age: 81
End: 2021-05-05

## 2021-05-05 ENCOUNTER — TELEPHONE (OUTPATIENT)
Dept: SPIRITUAL SERVICES | Age: 81
End: 2021-05-05

## 2021-05-05 NOTE — CARE COORDINATION
Ambulatory Care Coordination Note  5/5/2021  CM Risk Score: 5  Charlson 10 Year Mortality Risk Score: 100%     ACC: Jose Martin Gonzalez RN    Summary Note: Spoke with patient who stated he is not feeling great today, having body aches today. ACM reminded him of his PT appointment this afternoon, he stated that he isn't sure he will go due to his aches. ACM suggested going and it may help with his aches. Patient is still very confused about his appointments. ACM told him that she will mail him a list of his upcoming appointments and he can call ACM with any concerns. Care Coordination Interventions    Program Enrollment: Complex Care  Referral from Primary Care Provider: Yes  Suggested Interventions and Community Resources  Disease Association: Completed (Comment: REFERRALS: CARDIOLOGY, GI, PAIN MANAGEMENT, NEUROLOGY )  Physical Therapy: Completed  Social Work: Completed  Transportation Support: Declined  Zone Management Tools: In Process         Goals Addressed                 This Visit's Progress     Conditions and Symptoms   No change     I will schedule office visits, as directed by my provider. I will keep my appointment or reschedule if I have to cancel. I will notify my provider of any barriers to my plan of care. I will follow my Zone Management tool to seek urgent or emergent care. I will notify my provider of any symptoms that indicate a worsening of my condition. Barriers: overwhelmed by complexity of regimen  Plan for overcoming my barriers: WORK WITH ac  Confidence: 7/10  Anticipated Goal Completion Date: 7.5.21                Prior to Admission medications    Medication Sig Start Date End Date Taking? Authorizing Provider   magnesium oxide (MAG-OX) 400 MG tablet Take 1 tablet by mouth daily 4/28/21 5/28/21  Yocasta Deleon MD   butalbital-acetaminophen-caffeine (FIORICET, ESGIC) -79 MG per tablet Take 1 tab only for severe headache.    Can repeat after 4 hrs if needed  Max 2 tabs/24 hrs.   Max 4 tabs/week 4/28/21   Aman Jha MD   gabapentin (NEURONTIN) 100 MG capsule Gabapentin 100 mg cap  Week 1 take 100 mg 1 cap at bedtime  Week 2 take 200 mg 2 cap at bedtime and   Week 3 take 300 mg 3 cap at bedtime. 4/28/21 5/28/21  Aman Jha MD   gabapentin (NEURONTIN) 300 MG capsule Take 1 capsule by mouth nightly for 30 days.  5/28/21 6/27/21  Aman Jha MD   esomeprazole (NEXIUM) 20 MG delayed release capsule Take 1 capsule by mouth every morning (before breakfast) 3/30/21   JW Brown - CNP   atorvastatin (LIPITOR) 40 MG tablet take 1 tablet by mouth once daily 11/24/20   JW Brown - CNP   doxepin (SINEQUAN) 25 MG capsule Take by mouth 3/20/20   Historical Provider, MD   ticagrelor (BRILINTA) 90 MG TABS tablet Take by mouth 1/16/20   Historical Provider, MD   acetaminophen (TYLENOL 8 HOUR ARTHRITIS PAIN) 650 MG extended release tablet Take 1 tablet by mouth every 8 hours as needed for Pain 9/17/20   Amrita Ng DO   clopidogrel (PLAVIX) 75 MG tablet Take 1 tablet by mouth daily 7/1/20   JW Brown CNP   metoprolol tartrate (LOPRESSOR) 25 MG tablet 1/2 tablet by mouth twice a day for hypertension 5/26/20 8/24/20  Medardo Kessler APRN - CNP   carbidopa-levodopa (SINEMET)  MG per tablet  1/7/20   Historical Provider, MD   nitroGLYCERIN (NITROSTAT) 0.4 MG SL tablet Place 1 tablet under the tongue every 5 minutes as needed for Chest pain If no relief of chest pain after 3rd dose, go to the ER or call 911 4/2/19   JW Brown CNP       Future Appointments   Date Time Provider Shireen Charles   5/5/2021  2:15 PM Julio C Vallejo PTA [facility]   5/12/2021  1:30 PM Ananda Barbosa MD [facility]   5/18/2021  9:30 AM MD Roula Baird   5/25/2021 10:10 AM Aman Jha MD [facility]   5/25/2021  3:40 PM Kuldip Pete MD [facility] 6/1/2021  2:30 PM Freda Robins MD [address]   6/14/2021  2:00 PM JW Greenberg - CNP [place]   6/29/2021  2:30 PM Phuong Hills MD Neuro [facility]      and   General Assessment    Do you have any symptoms that are causing concern?: Yes  Progression since Onset: Intermittent - Waxing/Waning  Reported Symptoms: Pain, Other (Comment: body aches)

## 2021-05-06 ENCOUNTER — CARE COORDINATION (OUTPATIENT)
Dept: CARE COORDINATION | Age: 81
End: 2021-05-06

## 2021-05-10 ENCOUNTER — CARE COORDINATION (OUTPATIENT)
Dept: CARE COORDINATION | Age: 81
End: 2021-05-10

## 2021-05-10 NOTE — TELEPHONE ENCOUNTER
Pt LVM stating he had a missed call from the office about an appt tomorrow. Called pt back and informed him that his appt is on Wed 5/12/21 at 130pm at [facility] location.  Pt voices understanding

## 2021-05-10 NOTE — CARE COORDINATION
Attempted to contact patient to engage in conversation regarding the need for a smoke detector and possible [employer]. There was no answer, HC left a message for patient and also left her contact information for a return call.     Plan of Care  [facility]. will consult AC/Carlene Cedeno regarding how to proceed

## 2021-05-11 ENCOUNTER — CARE COORDINATION (OUTPATIENT)
Dept: CARE COORDINATION | Age: 81
End: 2021-05-11

## 2021-05-11 ENCOUNTER — HOSPITAL ENCOUNTER (OUTPATIENT)
Age: 81
Discharge: HOME | End: 2021-05-11
Payer: MEDICARE

## 2021-05-11 DIAGNOSIS — E78.00 PURE HYPERCHOLESTEROLEMIA: ICD-10-CM

## 2021-05-11 DIAGNOSIS — E03.9 HYPOTHYROIDISM, UNSPECIFIED TYPE: ICD-10-CM

## 2021-05-11 DIAGNOSIS — R73.03 PREDIABETES: ICD-10-CM

## 2021-05-11 DIAGNOSIS — N18.31 STAGE 3A CHRONIC KIDNEY DISEASE (HCC): ICD-10-CM

## 2021-05-11 LAB
ALBUMIN SERPL-MCNC: 4.2 G/DL (ref 3.5–5.2)
ALBUMIN/GLOBULIN RATIO: 1.2 (ref 1–2.5)
ALP BLD-CCNC: 116 U/L (ref 40–129)
ALT SERPL-CCNC: <5 U/L (ref 5–41)
AST SERPL-CCNC: 21 U/L
BILIRUB SERPL-MCNC: 0.28 MG/DL (ref 0.3–1.2)
BILIRUBIN DIRECT: 0.09 MG/DL
BILIRUBIN, INDIRECT: 0.19 MG/DL (ref 0–1)
CHOLESTEROL, FASTING: 162 MG/DL
CHOLESTEROL/HDL RATIO: 3
ESTIMATED AVERAGE GLUCOSE: 120 MG/DL
GLOBULIN: ABNORMAL G/DL (ref 1.5–3.8)
HBA1C MFR BLD: 5.8 % (ref 4–6)
HDLC SERPL-MCNC: 54 MG/DL
LDL CHOLESTEROL: 92 MG/DL (ref 0–130)
THYROXINE, FREE: 0.98 NG/DL (ref 0.93–1.7)
TOTAL PROTEIN: 7.6 G/DL (ref 6.4–8.3)
TRIGLYCERIDE, FASTING: 79 MG/DL
TSH SERPL DL<=0.05 MIU/L-ACNC: 4.24 MIU/L (ref 0.3–5)
URIC ACID: 7.5 MG/DL (ref 3.4–7)
VLDLC SERPL CALC-MCNC: NORMAL MG/DL (ref 1–30)

## 2021-05-11 PROCEDURE — 36415 COLL VENOUS BLD VENIPUNCTURE: CPT

## 2021-05-11 PROCEDURE — 80076 HEPATIC FUNCTION PANEL: CPT

## 2021-05-11 PROCEDURE — 83036 HEMOGLOBIN GLYCOSYLATED A1C: CPT

## 2021-05-11 PROCEDURE — 84550 ASSAY OF BLOOD/URIC ACID: CPT

## 2021-05-11 PROCEDURE — 84443 ASSAY THYROID STIM HORMONE: CPT

## 2021-05-11 PROCEDURE — 80061 LIPID PANEL: CPT

## 2021-05-11 PROCEDURE — 84439 ASSAY OF FREE THYROXINE: CPT

## 2021-05-11 NOTE — CARE COORDINATION
ACMANDO spoke briefly with patient to remind him of his GI appointment  On 5.12.21 at 1:30. He agreed to go and was provided the address. He denied any needs from [facility] or PCP today.

## 2021-05-12 ENCOUNTER — OFFICE VISIT (OUTPATIENT)
Dept: GASTROENTEROLOGY | Age: 81
End: 2021-05-12
Payer: MEDICARE

## 2021-05-12 ENCOUNTER — TELEPHONE (OUTPATIENT)
Dept: GASTROENTEROLOGY | Age: 81
End: 2021-05-12

## 2021-05-12 ENCOUNTER — TELEPHONE (OUTPATIENT)
Dept: SPIRITUAL SERVICES | Age: 81
End: 2021-05-12

## 2021-05-12 VITALS
HEART RATE: 71 BPM | OXYGEN SATURATION: 98 % | TEMPERATURE: 97.5 F | BODY MASS INDEX: 27.71 KG/M2 | SYSTOLIC BLOOD PRESSURE: 122 MMHG | WEIGHT: 176.9 LBS | DIASTOLIC BLOOD PRESSURE: 72 MMHG

## 2021-05-12 DIAGNOSIS — Z51.81 ENCOUNTER FOR MONITORING ANTIPLATELET THERAPY: ICD-10-CM

## 2021-05-12 DIAGNOSIS — I63.9 CEREBROVASCULAR ACCIDENT (CVA), UNSPECIFIED MECHANISM (HCC): ICD-10-CM

## 2021-05-12 DIAGNOSIS — Z79.02 ENCOUNTER FOR MONITORING ANTIPLATELET THERAPY: ICD-10-CM

## 2021-05-12 DIAGNOSIS — Z86.010 HISTORY OF COLON POLYPS: Primary | ICD-10-CM

## 2021-05-12 PROCEDURE — 99214 OFFICE O/P EST MOD 30 MIN: CPT | Performed by: INTERNAL MEDICINE

## 2021-05-12 ASSESSMENT — ENCOUNTER SYMPTOMS
ANAL BLEEDING: 0
TROUBLE SWALLOWING: 0
DIARRHEA: 0
VOMITING: 0
NAUSEA: 0
RESPIRATORY NEGATIVE: 1
COUGH: 0
ABDOMINAL PAIN: 0
SORE THROAT: 0
BLOOD IN STOOL: 0
SHORTNESS OF BREATH: 0
ABDOMINAL DISTENTION: 0
CONSTIPATION: 0
CHOKING: 0
BACK PAIN: 0
RECTAL PAIN: 0
VOICE CHANGE: 0

## 2021-05-12 NOTE — PROGRESS NOTES
GI OFFICE FOLLOW UP    INTERVAL HISTORY:   No referring provider defined for this encounter. Chief Complaint   Patient presents with    Colon Cancer Screening     Patient is here with a colon referral.  Patient is currently taken brilinta and plavis       No diagnosis found. HISTORY OF PRESENT ILLNESS: Mr.Luis SHANIA Bush is a 79 y.o. male with a past history remarkable for ,       Patient seen regarding surveillance colonoscopy. This patient had a colonoscopy done in 2017 and at the time he was found to have multiple colon polyps. At present he denies GI symptoms including dysphagia, dyspepsia. Denies abdominal pain. Has good appetite and has satisfactory bowel movements. Overall he denies GI symptoms. No melena or hematochezia as well. Does have GERD symptoms and has been taking PPI therapy with relief of symptoms. 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 At present he is out antiplatelet therapy/Brilinta and also clopidogrel. I am not clear whether he has a prescriber both medications or there is any confusion. Devious records reviewed under colonoscopy findings in 2017 noted. At that time he had less than adequate preparation. There is a concern because of poor preparation, missing colonic lesions. Patient has mild difficulty communication because of speech. Reviewed his records and he has multiple comorbidities. Basing on the records review he is supposed to have a CBC done which is still pending. On further discussion patient indicated that he is doing well as far as the GI issues are concerned. Past Medical,Family, and Social History reviewed and does contribute to the patient presenting condition. Patient's PMH/PSH,SH,PSYCH Hx, MEDs, ALLERGIES, and ROS were all reviewed and updated in the appropriate sections.  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 ([facility] [address] 3/18/2011    d/t being hurt on the job    CKD (chronic kidney disease) stage 2, GFR 60-89 ml/min     CKD (chronic kidney disease) stage 3, GFR 30-59 ml/min     Diverticulosis of colon     GERD (gastroesophageal reflux disease)     Impaired renal function     MRSA (methicillin resistant staph aureus) culture positive 9/23/2015    leg    Osteoarthritis of knee     Left    Proteinuria     Skull fracture ([facility] [address] 3/18/2011    d/t 12-foot drop on the job    Temporary loss of eyesight     periodically, happened x7    Tubular adenoma of colon 2012, 2017    mulitple       Past Surgical History:   Procedure Laterality Date    COLONOSCOPY  11/02/2012    poor prep, tubular adenoma    COLONOSCOPY  09/19/2017    diverticulosis, multiple polyps as described, spot marking done, pathology-tubular adenoma x 4    NE COLSC FLX W/RMVL OF TUMOR POLYP LESION SNARE TQ N/A 9/19/2017    COLONOSCOPY POLYPECTOMY SNARE/COLD BIOPSY with tatooing and apc transverse colon performed by Andreas Cabezas MD at [address] [place] [address] Right     rotator cuff       CURRENT MEDICATIONS:    Current Outpatient Medications:     allopurinol (ZYLOPRIM) 100 MG tablet, Take 1 tablet by mouth daily, Disp: 90 tablet, Rfl: 1    acetaminophen (TYLENOL 8 HOUR ARTHRITIS PAIN) 650 MG extended release tablet, Take 1 tablet by mouth every 8 hours as needed for Pain, Disp: 90 tablet, Rfl: 5    atorvastatin (LIPITOR) 40 MG tablet, take 1 tablet by mouth once daily, Disp: 90 tablet, Rfl: 3    clopidogrel (PLAVIX) 75 MG tablet, Take 1 tablet by mouth daily, Disp: 90 tablet, Rfl: 1    doxepin (SINEQUAN) 25 MG capsule, Take 1 capsule by mouth nightly, Disp: 90 capsule, Rfl: 1    esomeprazole (NEXIUM) 20 MG delayed release capsule, Take 1 capsule by mouth every morning (before breakfast), Disp: 90 capsule, Rfl: 1    magnesium oxide (MAG-OX) 400 MG tablet, Take 1 tablet by mouth daily, Disp: 90 tablet, Rfl: 3    metoprolol tartrate (LOPRESSOR) 25 MG tablet, 1/2 tablet by mouth twice a day for hypertension, Disp: 45 tablet, Rfl: 1    nitroGLYCERIN (NITROSTAT) 0.4 MG SL tablet, Place 1 tablet under the tongue every 5 minutes as needed for Chest pain If no relief of chest pain after 3rd dose, go to the ER or call 911, Disp: 25 tablet, Rfl: 0    pantoprazole (PROTONIX) 40 MG tablet, take 1 tablet by mouth every morning, Disp: 90 tablet, Rfl: 1    metoprolol tartrate (LOPRESSOR) 25 MG tablet, take 1/2 tablet by mouth twice a day for hypertension, Disp: 45 tablet, Rfl: 1    butalbital-acetaminophen-caffeine (FIORICET, ESGIC) -40 MG per tablet, Take 1 tab only for severe headache. Can repeat after 4 hrs if needed Max 2 tabs/24 hrs. Max 4 tabs/week, Disp: 28 tablet, Rfl: 3    gabapentin (NEURONTIN) 100 MG capsule, Gabapentin 100 mg cap Week 1 take 100 mg 1 cap at bedtime Week 2 take 200 mg 2 cap at bedtime and  Week 3 take 300 mg 3 cap at bedtime. , Disp: 45 capsule, Rfl: 0    [START ON 5/28/2021] gabapentin (NEURONTIN) 300 MG capsule, Take 1 capsule by mouth nightly for 30 days. , Disp: 30 capsule, Rfl: 2    ticagrelor (BRILINTA) 90 MG TABS tablet, Take by mouth, Disp: , Rfl:     carbidopa-levodopa (SINEMET)  MG per tablet, , Disp: , Rfl:     ALLERGIES:   Allergies   Allergen Reactions    Dye [Iodides]      pain    Aspirin      Brain bleed         FAMILY HISTORY: History reviewed. No pertinent family history.       SOCIAL HISTORY:   Social History     Socioeconomic History    Marital status: Single     Spouse name: Not on file    Number of children: Not on file    Years of education: Not on file    Highest education level: Not on file   Occupational History    Not on file   Social Needs    Financial resource strain: Not very hard    Food insecurity     Worry: Never true     Inability: Never true   [employer] needs Medical: Patient refused     Non-medical: Patient refused   Tobacco Use    Smoking status: Former Smoker    Smokeless tobacco: Never Used   Substance and Sexual Activity    Alcohol use: No    Drug use: No    Sexual activity: Not Currently   Lifestyle    Physical activity     Days per week: 2 days     Minutes per session: 20 min    Stress: Only a little   Relationships    Social connections     Talks on phone: Not on file     Gets together: Not on file     Attends Jehovah's witness service: Not on file     Active member of club or organization: Not on file     Attends meetings of clubs or organizations: Not on file     Relationship status: Not on file    Intimate partner violence     Fear of current or ex partner: Not on file     Emotionally abused: Not on file     Physically abused: Not on file     Forced sexual activity: Not on file   Other Topics Concern    Not on file   Social History Narrative    Not on file         REVIEW OF SYSTEMS:         Review of Systems   Constitutional: Negative. Negative for appetite change, fatigue and unexpected weight change. HENT: Negative. Negative for dental problem, sore throat, trouble swallowing and voice change. Eyes: Positive for visual disturbance (glasses). Respiratory: Negative. Negative for cough, choking and shortness of breath. Cardiovascular: Negative. Negative for chest pain and leg swelling. Gastrointestinal: Negative for abdominal distention, abdominal pain, anal bleeding, blood in stool, constipation, diarrhea, nausea, rectal pain and vomiting. Genitourinary: Negative for difficulty urinating. Musculoskeletal: Positive for gait problem (cane) and myalgias. Negative for back pain and joint swelling. Neurological: Positive for tremors (parkerson) and headaches. Negative for dizziness, weakness, light-headedness and numbness. Hematological: Bruises/bleeds easily. Psychiatric/Behavioral: Negative for sleep disturbance.  The patient is nervous/anxious. PHYSICAL EXAMINATION:     Vital signs reviewed per the nursing documentation. There were no vitals taken for this visit. There is no height or weight on file to calculate BMI. Physical Exam  Vitals signs reviewed. Constitutional:       Appearance: Normal appearance. Comments: Moderately overweight patient. HENT:      Head: Normocephalic and atraumatic. Eyes:      Extraocular Movements: Extraocular movements intact. Conjunctiva/sclera: Conjunctivae normal.   Cardiovascular:      Rate and Rhythm: Normal rate and regular rhythm. Heart sounds: Normal heart sounds. Pulmonary:      Effort: Pulmonary effort is normal.      Breath sounds: Normal breath sounds. Abdominal:      General: Bowel sounds are normal. There is no distension. Palpations: Abdomen is soft. There is no mass. Tenderness: There is no abdominal tenderness. There is no guarding. Hernia: No hernia is present. Musculoskeletal:      Comments: Able to walk with a walker because of neurological deficit. Skin:     General: Skin is warm and dry. Comments: Bruising of skin. Neurological:      General: No focal deficit present. Mental Status: He is alert and oriented to person, place, and time. Motor: Weakness present.       Gait: Gait abnormal.   Psychiatric:         Mood and Affect: Mood normal.         Behavior: Behavior normal.           LABORATORY DATA: Reviewed  Lab Results   Component Value Date    WBC 5.3 07/20/2020    HGB 12.4 (L) 07/20/2020    HCT 39.1 (L) 07/20/2020    MCV 86.9 07/20/2020     07/20/2020     09/21/2020    K 5.3 09/21/2020     09/21/2020    CO2 25 09/21/2020    BUN 16 09/21/2020    CREATININE 1.47 (H) 09/21/2020    LABPROT 7.6 04/24/2012    LABALBU 4.2 05/11/2021    BILITOT 0.28 (L) 05/11/2021    ALKPHOS 116 05/11/2021    AST 21 05/11/2021    ALT <5 (L) 05/11/2021         Lab Results   Component Value Date    RBC 4.50 07/20/2020 HGB 12.4 (L) 07/20/2020    MCV 86.9 07/20/2020    MCH 27.6 07/20/2020    MCHC 31.7 07/20/2020    RDW 16.3 (H) 07/20/2020    MPV 9.4 07/20/2020    BASOPCT 1 07/20/2020    LYMPHSABS 1.25 07/20/2020    MONOSABS 0.43 07/20/2020    NEUTROABS 3.28 07/20/2020    EOSABS 0.24 07/20/2020    BASOSABS 0.04 07/20/2020         DIAGNOSTIC TESTING:     No results found. Assessment  No diagnosis found. Plan    We will discuss with the neurology service regarding this patient and check whether he really needs both Brilinta and clopidogrel. If he is stable, will consider colonoscopy. Meanwhile patient is advised high-fiber diet and discussed regarding nutrition. To see me in the next 2 to 3 months. If he is stable, will consider further work-up. Thank you for allowing me to participate in the care of Mr. Moncho Fonseca. For any further questions please do not hesitate to contact me. I have reviewed and agree with the ROS entered by the MA/LPN. Note is dictated utilizing voice recognition software. Unfortunately this leads to occasional typographical errors.  Please contact our office if you have any questions        Luis Roldan MD,FACP, [facility]  Board Certified in Gastroenterology and [address] [place] [address] Gastroenterology  Office #: [phone]

## 2021-05-13 ENCOUNTER — TELEPHONE (OUTPATIENT)
Dept: GASTROENTEROLOGY | Age: 81
End: 2021-05-13

## 2021-05-17 ENCOUNTER — CARE COORDINATION (OUTPATIENT)
Dept: CARE COORDINATION | Age: 81
End: 2021-05-17

## 2021-05-17 NOTE — PRE-PROCEDURE INSTRUCTIONS
Called for as courtesy reminder for his appointment for tomorrow. . Chart reviewed along with medication list. Pre procedure instructions given. Patient acknowledges an understanding.

## 2021-05-18 ENCOUNTER — TELEPHONE (OUTPATIENT)
Dept: NEUROLOGY | Age: 81
End: 2021-05-18

## 2021-05-18 ENCOUNTER — HOSPITAL ENCOUNTER (OUTPATIENT)
Dept: PAIN MANAGEMENT | Age: 81
Discharge: HOME | End: 2021-05-18
Payer: MEDICARE

## 2021-05-18 ENCOUNTER — TELEPHONE (OUTPATIENT)
Dept: PAIN MANAGEMENT | Age: 81
End: 2021-05-18

## 2021-05-18 DIAGNOSIS — M47.816 LUMBAR FACET ARTHROPATHY: ICD-10-CM

## 2021-05-18 DIAGNOSIS — M51.36 DDD (DEGENERATIVE DISC DISEASE), LUMBAR: ICD-10-CM

## 2021-05-18 DIAGNOSIS — M54.16 LUMBAR RADICULOPATHY: Primary | ICD-10-CM

## 2021-05-18 DIAGNOSIS — M47.817 LUMBOSACRAL SPONDYLOSIS WITHOUT MYELOPATHY: ICD-10-CM

## 2021-05-19 ENCOUNTER — TELEPHONE (OUTPATIENT)
Dept: SPIRITUAL SERVICES | Age: 81
End: 2021-05-19

## 2021-05-19 ENCOUNTER — TELEPHONE (OUTPATIENT)
Dept: PAIN MANAGEMENT | Age: 81
End: 2021-05-19

## 2021-05-19 NOTE — TELEPHONE ENCOUNTER
Called Neurologist office and spoke with Lucy regarding the message I received from Dr Jose Lopez. Shared with Lucy, per Dr. Boby Muro, [facility] [place] there are any concerns for drug overdosing or misusing and patient not yet back to his baseline, get him evaluated in the ER. \"  As we do not prescribe for this patient, discussed with Lucy contacting patient with any further follow up. Lucy verbalized an understanding.

## 2021-05-19 NOTE — TELEPHONE ENCOUNTER
Writer attempts to contact patient regarding possible interest in ACP. Patient does not answer and his voicemail is full.

## 2021-05-24 ENCOUNTER — CARE COORDINATION (OUTPATIENT)
Dept: CARE COORDINATION | Age: 81
End: 2021-05-24

## 2021-05-24 NOTE — CARE COORDINATION
Attempting to reach patient as a reminder for his neurology appt tomorrow. Patient answered and did not speak, when ACM spoke he hung up. ACM called back and phone stated his VM is full and can nt leave message.

## 2021-05-25 ENCOUNTER — CARE COORDINATION (OUTPATIENT)
Dept: CARE COORDINATION | Age: 81
End: 2021-05-25

## 2021-05-25 NOTE — CARE COORDINATION
Attempted to contact this patient again. Patients voicemail was full, HC was unable to  leave a message for the patient.     Plan of Care  [facility] [place], [facility]. will consult AC/Carlene Almodovar Seek regarding not being able to reach patient

## 2021-06-01 ENCOUNTER — CARE COORDINATION (OUTPATIENT)
Dept: CARE COORDINATION | Age: 81
End: 2021-06-01

## 2021-06-01 NOTE — CARE COORDINATION
HC continues to attempt to reach patient, there is no answer. Patients voicemail  comes on right away. HC left another message requesting that the patient will   call.  would like to assist patient with getting smoke detectors in his home,  and referring him to the Passport Program to determine if he qualifies for in home  Services.     Plan of Care  [facility]. hopes patient will respond, if not will speak with ACM/Angela Georgeana Carrel for the next step

## 2021-06-02 NOTE — TELEPHONE ENCOUNTER
Have we been successful in regards to finding a medical social worker for this patient? The other option I'm think of is calling APS to start a case and they can hopefully get a  assigned to oversee his medical care.

## 2021-06-03 ENCOUNTER — CARE COORDINATION (OUTPATIENT)
Dept: CARE COORDINATION | Age: 81
End: 2021-06-03

## 2021-06-03 NOTE — CARE COORDINATION
2 attempts to reach patient today to discuss needs and follow up from last call. Mailbox is full. ACM left message for emergency contact Rosa Boss to return call due to concern that patient is not answering his phone and had told ACM that he was going to stop his medications. Number listed for Juan Daigle, emergency contact is the wrong number. ACM spoke with APS at PCP request.   Explained patient has missed or cancelled multiple appointments, not taking his medications correctly, not accepting help from [facility], [facility]. or home health. APS stated there will be a visit within 3 work days to check on patient and recommended calling non emergency police to do a safety check. ACM called [place] non emergency to request a safety check. They will be sending an officer to the home.

## 2021-06-07 ENCOUNTER — CARE COORDINATION (OUTPATIENT)
Dept: CARE COORDINATION | Age: 81
End: 2021-06-07

## 2021-06-07 NOTE — CARE COORDINATION
Chinik Luissheba from [place] [address] left message for ACM to return her call stating that they did go out to patient's home and were under the impression that patient needs clarification on his medications. ACM left a return message for APS  explaining that ACM and PCP have made multiple attempts to have patient go into PCP office with his medications to clarify and teach patient which meds he is on and why. Patient declines appointment and will now not return calls. ACM called patient and he answered,  he said APS encouraged him to make an appointment with PCP and take all of his medications to review so referral to pharmacy can be placed. APS called ACM back and stated that he has a bowl with over 30-40  meds in it, including vitamins. Patient is extremely confused. Needs PCP appointment and to agree to a nurse at home for medications and referral to pharmacy. ACM will route to MA to get patient scheduled and will place pharmacy referral after appointment.

## 2021-06-08 ENCOUNTER — CARE COORDINATION (OUTPATIENT)
Dept: CARE COORDINATION | Age: 81
End: 2021-06-08

## 2021-06-11 ENCOUNTER — CARE COORDINATION (OUTPATIENT)
Dept: CARE COORDINATION | Age: 81
End: 2021-06-11

## 2021-06-11 NOTE — CARE COORDINATION
Left  for patient reminding him of his PCP appointment on 6.14.21 @ 2 pm.   Reminded patient to bring all of his bottles of medication so that the MA or PCP can help him sort through what he should be on. After visit Pharmacy referral and possible home health and bubble packing will be addressed.

## 2021-06-14 ENCOUNTER — CARE COORDINATION (OUTPATIENT)
Dept: CARE COORDINATION | Age: 81
End: 2021-06-14

## 2021-06-14 NOTE — CARE COORDINATION
ACM spoke with patient and reminded him of his appointment today with PCP at 2 pm. Reminded him to take all of his medications so that PCP can help him determine what he needs to be taking.

## 2021-06-15 ENCOUNTER — CARE COORDINATION (OUTPATIENT)
Dept: CARE COORDINATION | Age: 81
End: 2021-06-15

## 2021-06-15 NOTE — CARE COORDINATION
HC attempted to contact this patient. Patients phone went immediately to the voicemail. HC left a message for patient, with her contact information for a return call.     Plan of Care  [facility]. will continue to make attempts to patient

## 2021-06-21 ENCOUNTER — CARE COORDINATION (OUTPATIENT)
Dept: CARE COORDINATION | Age: 81
End: 2021-06-21

## 2021-06-21 NOTE — CARE COORDINATION
Ambulatory Care Coordination Note  6/21/2021  CM Risk Score: 5  Charlson 10 Year Mortality Risk Score: 100%     ACC: Milka Levine, MARCO ANTONIO  Decision maker on file    Patient's Plan of Care    Dinaht Status: Active Yes    Reason for Encounter:   Follow up on medication-clarification    Patient stated his PCP did clarify and sort his medications out. He believes one of his medications may be causing a head ache, pressure in the back of his head, Gabapentin 300 mg. ACM explained that this medication is for pain but will route to PCP. He has a follow up with neurology scheduled. ACM reviewed medications, explained what each are for. Patient continues to be upset that he is taking so many medications. ACM explained bubble packing through [facility]. Outstanding Orders: discussed  Next planned follow up call: 1-2 weeks       Care Coordination Interventions    Program Enrollment: Complex Care  Referral from Primary Care Provider: Yes  Suggested Interventions and Community Resources  Adult Protective Services: Completed  Disease Association: Completed (Comment: REFERRALS: CARDIOLOGY, GI, PAIN MANAGEMENT, NEUROLOGY )  Physical Therapy: Completed  Social Work: Completed  Transportation Support: Declined  Zone Management Tools: In Process         Goals Addressed                 This Visit's Progress     Conditions and Symptoms   Improving     I will schedule office visits, as directed by my provider. I will keep my appointment or reschedule if I have to cancel. I will notify my provider of any barriers to my plan of care. I will follow my Zone Management tool to seek urgent or emergent care. I will notify my provider of any symptoms that indicate a worsening of my condition.     Barriers: overwhelmed by complexity of regimen  Plan for overcoming my barriers: WORK WITH acm  Confidence: 7/10  Anticipated Goal Completion Date: 7.5.21                Prior to Admission medications    Medication Sig Start Date End Date Taking? Authorizing Provider   metoprolol tartrate (LOPRESSOR) 25 MG tablet take 1/2 tablet by mouth twice a day for hypertension 6/14/21   JW Elizabeth CNP   nitroGLYCERIN (NITROSTAT) 0.4 MG SL tablet Place 1 tablet under the tongue every 5 minutes as needed for Chest pain If no relief of chest pain after 3rd dose, go to the ER or call 911 6/14/21   JW Elizabeth CNP   atorvastatin (LIPITOR) 40 MG tablet take 1 tablet by mouth once daily 6/14/21   JW Elizabeth CNP   allopurinol (ZYLOPRIM) 100 MG tablet Take 1 tablet by mouth daily 6/14/21   JW Elizabeth CNP   acetaminophen (TYLENOL 8 HOUR ARTHRITIS PAIN) 650 MG extended release tablet Take 1 tablet by mouth every 8 hours as needed for Pain 6/14/21   JW Elizabeth CNP   butalbital-acetaminophen-caffeine (FIORICET, ESGIC) -40 MG per tablet Take 1 tab prn only for severe headache. Can repeat after 4 hrs if needed. Max 2 tabs/24 hrs. Max 4 tabs/week 6/14/21   JW Elizabeth CNP   gabapentin (NEURONTIN) 300 MG capsule Take 1 capsule by mouth nightly for 90 days.  6/14/21 9/12/21  JW Elizabeth CNP   carbidopa-levodopa (SINEMET)  MG per tablet Take 1 tablet by mouth 4 times daily 6/14/21 9/12/21  JW Elizabeth CNP   clopidogrel (PLAVIX) 75 MG tablet Take 1 tablet by mouth daily 6/14/21   JW Elizabeth CNP   esomeprazole (NEXIUM) 20 MG delayed release capsule Take 1 capsule by mouth every morning (before breakfast) 6/14/21   JW Elizabeth CNP   magnesium oxide (MAG-OX) 400 MG tablet Take 1 tablet by mouth daily 6/14/21   JW Elizabeth CNP       Future Appointments   Date Time Provider Shireen Charles   6/29/2021  2:30 PM Yi Lowry MD Neuro [facility]   8/16/2021  2:00 PM JW Elizabeth CNP [place] [address] [place] [address] [facility]   8/17/2021  1:30 PM Juana Palomino MD [facility] [address] [facility] Assessment    Do you have any symptoms that are causing concern?: Yes  Progression since Onset: Intermittent - Waxing/Waning  Reported Symptoms: Pain

## 2021-06-22 NOTE — CARE COORDINATION
Contacted patient with PCP recommendations that he can stop gabapentin until he sees neurology. ACM called [facility] to update med change. Will follow up with patient in 1 week.

## 2021-06-22 NOTE — TELEPHONE ENCOUNTER
He can stop the gabapentin until he sees neurology.  Please call to [facility] and have them take it out of his bubble pack

## 2021-06-23 ENCOUNTER — CARE COORDINATION (OUTPATIENT)
Dept: CARE COORDINATION | Age: 81
End: 2021-06-23

## 2021-06-23 NOTE — CARE COORDINATION
HC again attempted to reach patient.  was only able to leave a message for patient. HC left her contact information for a return call and stated that she was asked to assist patient with getting smoke detectors for his home, and to complete a referral to [address] [place] [address] for assistance in his home.     Plan of Care  [facility]. will attempt again to contact patient

## 2021-06-28 ENCOUNTER — CARE COORDINATION (OUTPATIENT)
Dept: CARE COORDINATION | Age: 81
End: 2021-06-28

## 2021-06-28 ENCOUNTER — HOSPITAL ENCOUNTER (OUTPATIENT)
Age: 81
Discharge: HOME | End: 2021-06-28
Payer: MEDICARE

## 2021-06-28 DIAGNOSIS — N18.30 CKD (CHRONIC KIDNEY DISEASE), STAGE III (HCC): ICD-10-CM

## 2021-06-28 DIAGNOSIS — N18.30 BENIGN HYPERTENSION WITH CKD (CHRONIC KIDNEY DISEASE) STAGE III (HCC): ICD-10-CM

## 2021-06-28 DIAGNOSIS — I12.9 BENIGN HYPERTENSION WITH CKD (CHRONIC KIDNEY DISEASE) STAGE III (HCC): ICD-10-CM

## 2021-06-28 LAB
-: NORMAL
ABSOLUTE EOS #: 0.14 K/UL (ref 0–0.44)
ABSOLUTE IMMATURE GRANULOCYTE: <0.03 K/UL (ref 0–0.3)
ABSOLUTE LYMPH #: 1.21 K/UL (ref 1.1–3.7)
ABSOLUTE MONO #: 0.55 K/UL (ref 0.1–1.2)
AMORPHOUS: NORMAL
ANION GAP SERPL CALCULATED.3IONS-SCNC: 11 MMOL/L (ref 9–17)
BACTERIA: NORMAL
BASOPHILS # BLD: 0 % (ref 0–2)
BASOPHILS ABSOLUTE: <0.03 K/UL (ref 0–0.2)
BILIRUBIN URINE: NEGATIVE
BUN BLDV-MCNC: 18 MG/DL (ref 8–23)
BUN/CREAT BLD: ABNORMAL (ref 9–20)
CALCIUM SERPL-MCNC: 9.1 MG/DL (ref 8.6–10.4)
CASTS UA: NORMAL /LPF (ref 0–8)
CHLORIDE BLD-SCNC: 103 MMOL/L (ref 98–107)
CO2: 23 MMOL/L (ref 20–31)
COLOR: YELLOW
COMMENT UA: ABNORMAL
CREAT SERPL-MCNC: 1.22 MG/DL (ref 0.7–1.2)
CRYSTALS, UA: NORMAL /HPF
DIFFERENTIAL TYPE: ABNORMAL
EOSINOPHIL,URINE: NORMAL
EOSINOPHILS RELATIVE PERCENT: 3 % (ref 1–4)
EPITHELIAL CELLS UA: NORMAL /HPF (ref 0–5)
GFR AFRICAN AMERICAN: >60 ML/MIN
GFR NON-AFRICAN AMERICAN: 57 ML/MIN
GFR SERPL CREATININE-BSD FRML MDRD: ABNORMAL ML/MIN/{1.73_M2}
GFR SERPL CREATININE-BSD FRML MDRD: ABNORMAL ML/MIN/{1.73_M2}
GLUCOSE BLD-MCNC: 115 MG/DL (ref 70–99)
GLUCOSE URINE: NEGATIVE
HCT VFR BLD CALC: 41.4 % (ref 40.7–50.3)
HEMOGLOBIN: 14 G/DL (ref 13–17)
IMMATURE GRANULOCYTES: 0 %
KETONES, URINE: NEGATIVE
LEUKOCYTE ESTERASE, URINE: NEGATIVE
LYMPHOCYTES # BLD: 21 % (ref 24–43)
MAGNESIUM: 3.3 MG/DL (ref 1.6–2.6)
MCH RBC QN AUTO: 30.6 PG (ref 25.2–33.5)
MCHC RBC AUTO-ENTMCNC: 33.8 G/DL (ref 28.4–34.8)
MCV RBC AUTO: 90.4 FL (ref 82.6–102.9)
MONOCYTES # BLD: 10 % (ref 3–12)
MUCUS: NORMAL
NITRITE, URINE: NEGATIVE
NRBC AUTOMATED: 0 PER 100 WBC
OTHER OBSERVATIONS UA: NORMAL
PDW BLD-RTO: 13.8 % (ref 11.8–14.4)
PH UA: 6.5 (ref 5–8)
PHOSPHORUS: 3 MG/DL (ref 2.5–4.5)
PLATELET # BLD: 214 K/UL (ref 138–453)
PLATELET ESTIMATE: ABNORMAL
PMV BLD AUTO: 8.8 FL (ref 8.1–13.5)
POTASSIUM SERPL-SCNC: 4.4 MMOL/L (ref 3.7–5.3)
PROTEIN UA: ABNORMAL
RBC # BLD: 4.58 M/UL (ref 4.21–5.77)
RBC # BLD: ABNORMAL 10*6/UL
RBC UA: NORMAL /HPF (ref 0–4)
RENAL EPITHELIAL, UA: NORMAL /HPF
SEG NEUTROPHILS: 66 % (ref 36–65)
SEGMENTED NEUTROPHILS ABSOLUTE COUNT: 3.77 K/UL (ref 1.5–8.1)
SODIUM BLD-SCNC: 137 MMOL/L (ref 135–144)
SPECIFIC GRAVITY UA: 1.01 (ref 1–1.03)
TRICHOMONAS: NORMAL
TURBIDITY: CLEAR
URINE HGB: NEGATIVE
UROBILINOGEN, URINE: NORMAL
WBC # BLD: 5.7 K/UL (ref 3.5–11.3)
WBC # BLD: ABNORMAL 10*3/UL
WBC UA: NORMAL /HPF (ref 0–5)
YEAST: NORMAL

## 2021-06-28 PROCEDURE — 80048 BASIC METABOLIC PNL TOTAL CA: CPT

## 2021-06-28 PROCEDURE — 85025 COMPLETE CBC W/AUTO DIFF WBC: CPT

## 2021-06-28 PROCEDURE — 86038 ANTINUCLEAR ANTIBODIES: CPT

## 2021-06-28 PROCEDURE — 81001 URINALYSIS AUTO W/SCOPE: CPT

## 2021-06-28 PROCEDURE — 84100 ASSAY OF PHOSPHORUS: CPT

## 2021-06-28 PROCEDURE — 86225 DNA ANTIBODY NATIVE: CPT

## 2021-06-28 PROCEDURE — 87205 SMEAR GRAM STAIN: CPT

## 2021-06-28 PROCEDURE — 83735 ASSAY OF MAGNESIUM: CPT

## 2021-06-28 PROCEDURE — 36415 COLL VENOUS BLD VENIPUNCTURE: CPT

## 2021-06-28 NOTE — CARE COORDINATION
Attempting to reach patient for a follow up care coordination call. Left detailed message for the patient to return my call with any questions or concerns. Reminder for neurology appointment tomorrow(6.29.21) left on VM as well.

## 2021-06-29 ENCOUNTER — CARE COORDINATION (OUTPATIENT)
Dept: CARE COORDINATION | Age: 81
End: 2021-06-29

## 2021-06-29 LAB
ANTI DNA DOUBLE STRANDED: <0.5 IU/ML
ANTI-NUCLEAR ANTIBODY (ANA): NEGATIVE
ENA ANTIBODIES SCREEN: 0.5 U/ML

## 2021-06-29 NOTE — CARE COORDINATION
AMBER received a call from [facility] at pain management stating that the patient is at [facility] but can not figure out where he is supposed to be. She tried to assist patient on how to get to Dr. Izabela Juan office. ARMANIM called patient who stated he is driving around the parking garage and there are no spots open. ACM encouraged him to go up further in the garage or to try the parking lot behind the building. Patient's phone kept dropping the call, spoke on and off multiple times. He stated every parking spot states he needs a permit to park there? ACM recommended pulling up in front of the heart and vascular and asking for help. Patient is very frustrated and stated he will just go home and take a bath and forget about it. ACM encouraged patient to continue to look for parking, go inside and ask for directions to this office.

## 2021-07-01 ENCOUNTER — CARE COORDINATION (OUTPATIENT)
Dept: CARE COORDINATION | Age: 81
End: 2021-07-01

## 2021-07-01 NOTE — CARE COORDINATION
HC attempted to contact patient there was no answer. Phone immediately went to message stating that the  mailbox was full.     Plan of Care  [facility]. will continue to attempt to reach out to patient

## 2021-07-08 ENCOUNTER — CARE COORDINATION (OUTPATIENT)
Dept: CARE COORDINATION | Age: 81
End: 2021-07-08

## 2021-07-08 ENCOUNTER — HOSPITAL ENCOUNTER (EMERGENCY)
Age: 81
Discharge: HOME | End: 2021-07-08
Attending: EMERGENCY MEDICINE
Payer: MEDICARE

## 2021-07-08 VITALS
HEIGHT: 67 IN | WEIGHT: 180 LBS | HEART RATE: 60 BPM | SYSTOLIC BLOOD PRESSURE: 134 MMHG | DIASTOLIC BLOOD PRESSURE: 84 MMHG | TEMPERATURE: 97.5 F | OXYGEN SATURATION: 97 % | RESPIRATION RATE: 16 BRPM | BODY MASS INDEX: 28.25 KG/M2

## 2021-07-08 DIAGNOSIS — H92.02 LEFT EAR PAIN: Primary | ICD-10-CM

## 2021-07-08 PROCEDURE — 99282 EMERGENCY DEPT VISIT SF MDM: CPT

## 2021-07-08 NOTE — CARE COORDINATION
HC attempted to contact patient, there was no answer. HC left  contact information for a return call.     Plan of Care  [facility] [place], [facility]. will continue to attempt to reach patient

## 2021-07-08 NOTE — ED PROVIDER NOTES
[facility] ED  Emergency Department Encounter  Emergency Medicine Resident     Pt Name: Lv Bearden  MRN: [id number]  [place] 1940  Date of evaluation: 7/8/21  PCP:  JW Salmon CNP    CHIEF COMPLAINT       Chief Complaint   Patient presents with    Foreign Body in Ear     left; \"states bug in ear\"       HISTORY OFPRESENT ILLNESS  (Location/Symptom, Timing/Onset, Context/Setting, Quality, Duration, Modifying Pelon Loser.)      Lv Bearden is a 79 y.o. male with past medical history including chronic kidney disease, GERD, intracranial bleed who presents with left ear pain and sensation that an insect is present in his ear. Patient states that he felt a \"lump\" on the inner portion of his left tragus. He then took a Merrick pin and placed this into the ear. He states he scraped the lump off which felt like a hard part of an insect to him. He then put the pin deeper into his ear canal and called out what he describes as a \"small worm\". Patient states that the worm was moving. He continues to complain of a sensation like something is moving inside his ear. He denies ever seeing or feeling an insect fly into his ear. He denies drainage from the ear or difficulty hearing. He denies fevers, rhinorrhea, congestion, chest pain, trouble breathing, nausea, vomiting, diarrhea, constipation or urinary symptoms. Patient denies any changes to his medications.     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 has a past surgical history that includes Colonoscopy (11/02/2012); shoulder surgery (Right); pr colsc flx w/rmvl of tumor polyp lesion snare tq (N/A, 9/19/2017); and Colonoscopy (09/19/2017). Social:  reports that he has quit smoking. He has never used smokeless tobacco. He reports that he does not drink alcohol and does not use drugs. Family Hx:   Family History   Problem Relation Age of Onset    No Known Problems Mother     No Known Problems Father        Allergies:  Dye [iodides] and Aspirin    Home Medications:  Prior to Admission medications    Medication Sig Start Date End Date Taking? Authorizing Provider   metoprolol tartrate (LOPRESSOR) 25 MG tablet take 1/2 tablet by mouth twice a day for hypertension 6/14/21   JW Villasenor CNP   nitroGLYCERIN (NITROSTAT) 0.4 MG SL tablet Place 1 tablet under the tongue every 5 minutes as needed for Chest pain If no relief of chest pain after 3rd dose, go to the ER or call 911 6/14/21   JW Villasenor CNP   atorvastatin (LIPITOR) 40 MG tablet take 1 tablet by mouth once daily 6/14/21   JW Villasenor CNP   allopurinol (ZYLOPRIM) 100 MG tablet Take 1 tablet by mouth daily 6/14/21   JW Villasenor CNP   acetaminophen (TYLENOL 8 HOUR ARTHRITIS PAIN) 650 MG extended release tablet Take 1 tablet by mouth every 8 hours as needed for Pain 6/14/21   JW Villasenor CNP   butalbital-acetaminophen-caffeine (FIORICET, ESGIC) -40 MG per tablet Take 1 tab prn only for severe headache. Can repeat after 4 hrs if needed. Max 2 tabs/24 hrs.  Max 4 tabs/week 6/14/21   JW Villasenor CNP   carbidopa-levodopa (SINEMET)  MG per tablet Take 1 tablet by mouth 4 times daily 6/14/21 9/12/21  JW Villasenor CNP   clopidogrel (PLAVIX) 75 MG tablet Take 1 tablet by mouth daily 6/14/21   JW Villasenor CNP   esomeprazole ([place] iCrumz) 20 MG delayed release capsule Take 1 capsule by mouth every morning (before breakfast) 6/14/21   JW Salmon CNP   magnesium oxide (MAG-OX) 400 MG tablet Take 1 tablet by mouth daily 6/14/21   JW Salmon CNP       REVIEW OFSYSTEMS    (2-9 systems for level 4, 10 or more for level 5)      Review of Systems   Constitutional: Negative for chills and fever. HENT: Positive for ear discharge and ear pain. Negative for congestion, rhinorrhea and sore throat. Eyes: Negative for visual disturbance. Respiratory: Negative for cough and shortness of breath. Cardiovascular: Negative for chest pain and leg swelling. Gastrointestinal: Negative for abdominal pain, constipation, diarrhea, nausea and vomiting. Genitourinary: Negative for decreased urine volume and hematuria. Musculoskeletal: Negative for arthralgias, back pain and neck pain. Skin: Negative for rash. Neurological: Negative for dizziness, weakness, numbness and headaches. All other systems reviewed and are negative. PHYSICAL EXAM   (up to 7 for level 4, 8 or more forlevel 5)      INITIAL VITALS:   Vitals:    07/08/21 1606 07/08/21 1609   BP:  134/84   Pulse: 60    Resp: 16    Temp: 97.5 °F (36.4 °C)    TempSrc: Oral    SpO2: 97%    Weight: 180 lb (81.6 kg)    Height: 5' 7\" (1.702 m)        Physical Exam  Vitals and nursing note reviewed. Constitutional:       General: He is not in acute distress. Appearance: He is not toxic-appearing. Comments: Elderly male sitting in stretcher no acute distress. He speaks in full sentences and answers questions appropriately. HENT:      Head: Normocephalic and atraumatic. Right Ear: Tympanic membrane, ear canal and external ear normal.      Left Ear: Tympanic membrane, ear canal and external ear normal.      Nose: Nose normal. No congestion or rhinorrhea. Mouth/Throat:      Mouth: Mucous membranes are moist.      Pharynx: Oropharynx is clear.  No oropharyngeal exudate or RESULTS / EMERGENCYDEPARTMENT COURSE / MDM     LABS:  No results found for this visit on 07/08/21. RADIOLOGY:  No results found. EKG  None      MEDICATIONS ORDERED:  No orders of the defined types were placed in this encounter. PROCEDURES:  None      CONSULTS:  None      FINAL IMPRESSION      1.  Left ear pain         DISPOSITION / PLAN     DISPOSITION Decision To Discharge 07/08/2021 04:33:30 PM      PATIENT REFERRED TO:  JW Lobato CNP  [address] [id number]  [phone]    Schedule an appointment as soon as possible for a visit       [facility] ED  [address] [place] [address]  [phone]    As needed      DISCHARGE MEDICATIONS:  Discharge Medication List as of 7/8/2021  4:36 PM          Nia Erazo DO  Emergency Medicine Resident    (Please note that portions of this note were completed with a voice recognition program.Efforts were made to edit the dictations but occasionally words are mis-transcribed.)        Nia Erazo DO  Resident  07/11/21 7419

## 2021-07-08 NOTE — ED NOTES
Pt arrived with c/o a \"bug\" being in his left ear x2 days. Denies pain. Per pt \"I can feel it moving in there. \"     Pat Sánchez RN  07/08/21 Timo Chavez RN  07/08/21 0436

## 2021-07-11 ASSESSMENT — ENCOUNTER SYMPTOMS
CONSTIPATION: 0
NAUSEA: 0
BACK PAIN: 0
RHINORRHEA: 0
DIARRHEA: 0
SORE THROAT: 0
ABDOMINAL PAIN: 0
VOMITING: 0
SHORTNESS OF BREATH: 0
COUGH: 0

## 2021-07-13 ENCOUNTER — CARE COORDINATION (OUTPATIENT)
Dept: CARE COORDINATION | Age: 81
End: 2021-07-13

## 2021-07-13 NOTE — CARE COORDINATION
Ambulatory Care Coordination  ED Follow up Call    Reason for ED visit: Ear irritation   Status:     not changed    Did you call your PCP prior to going to the ED? No      Did you receive a discharge instructions from the Emergency Room? Yes  Review of Instructions:     Understands what to report/when to return?:  Yes   Understands discharge instructions?:  Yes   Following discharge instructions?:  yes    Are there any new complaints of pain? No  New Pain Meds? No    Constipation prophylaxis needed? No    If you have a wound is the dressing clean, dry, and intact? N/A  Understands wound care regimen? N/A    Are there any other complaints/concerns that you wish to tell your provider? Still feels like something is in his ear. Will route to PCP for recommendations. FU appts/Provider:    Future Appointments   Date Time Provider Shireen Charles   8/16/2021  2:00 PM JW Pike - CNP [place] [address] [place] [address]   8/17/2021  1:30 PM Mario Alberto Maria MD [facility]           New Medications?:   No      Medication Reconciliation by phone - Yes  Understands Medications? Yes  Taking Medications? Yes  Can you swallow your pills? Yes    Any further needs in the home i.e. Equipment? Not Applicable    Link to services in community?:  Yes   Patient requested information on house cleaning agencies, will have Denice Funk [facility] [place], [facility]. look into this and call patient.

## 2021-07-14 ENCOUNTER — CARE COORDINATION (OUTPATIENT)
Dept: CARE COORDINATION | Age: 81
End: 2021-07-14

## 2021-07-15 ENCOUNTER — CARE COORDINATION (OUTPATIENT)
Dept: CARE COORDINATION | Age: 81
End: 2021-07-15

## 2021-07-15 NOTE — CARE COORDINATION
ACM received VM from patient stating that he was driving around looking all over for his doctors office. ACM returned call, no answer, no VM available. ARMANIM left VM for  at [facility] [facility]. Explained that this is the 2nd time patient has become lost while trying to go to an appointment. Also, although PCP has simplified and organized his medications patient continues to be confused about his medications.

## 2021-07-21 ENCOUNTER — CARE COORDINATION (OUTPATIENT)
Dept: CARE COORDINATION | Age: 81
End: 2021-07-21

## 2021-07-21 NOTE — CARE COORDINATION
Ambulatory Care Coordination Note  7/21/2021  CM Risk Score: 5  Charlson 10 Year Mortality Risk Score: 100%     ACC: Prabha Schwartz RN   Patient's Plan of Care    [facility]    Reason for Encounter:   1. Ear irritation  2. Medication compliance  3. Appointment compliance    Spoke with patient who stated that his ear was flushed at his PCP office and it does seem better today. Per patient he \"thinks he has all of his medications\". He has a head ache and would like to rest today. He did attend his PCP appointment. He cancelled last neurology appointment, could not find the building. Declined need for transportation. Next planned follow up call: 2 weeks, continue to discuss transportation to appointments to ensure he makes it to the correct building. Care Coordination Interventions    Program Enrollment: Complex Care  Referral from Primary Care Provider: Yes  Suggested Interventions and Community Resources  Adult Protective Services: Completed  Disease Association: Completed (Comment: REFERRALS: CARDIOLOGY, GI, PAIN MANAGEMENT, NEUROLOGY )  Physical Therapy: Completed  Social Work: Completed  Transportation Support: Declined  Zone Management Tools: In Process         Goals Addressed    None         Prior to Admission medications    Medication Sig Start Date End Date Taking?  Authorizing Provider   metoprolol tartrate (LOPRESSOR) 25 MG tablet take 1/2 tablet by mouth twice a day for hypertension 6/14/21   Earnstine Dessert, APRN - CNP   nitroGLYCERIN (NITROSTAT) 0.4 MG SL tablet Place 1 tablet under the tongue every 5 minutes as needed for Chest pain If no relief of chest pain after 3rd dose, go to the ER or call 911 6/14/21   Earnstine Dessert, APRN - CNP   atorvastatin (LIPITOR) 40 MG tablet take 1 tablet by mouth once daily 6/14/21   Earnstine Dessert, APRN - CNP   allopurinol (ZYLOPRIM) 100 MG tablet Take 1 tablet by mouth daily 6/14/21   Earnstine Dessert, APRN - CNP acetaminophen (TYLENOL 8 HOUR ARTHRITIS PAIN) 650 MG extended release tablet Take 1 tablet by mouth every 8 hours as needed for Pain 6/14/21   Earnstine Dessert, APRN - CNP   butalbital-acetaminophen-caffeine (FIORICET, ESGIC) -40 MG per tablet Take 1 tab prn only for severe headache. Can repeat after 4 hrs if needed. Max 2 tabs/24 hrs.  Max 4 tabs/week 6/14/21   Earnstine Dessert, APRN - CNP   carbidopa-levodopa (SINEMET)  MG per tablet Take 1 tablet by mouth 4 times daily 6/14/21 9/12/21  Earnstine Dessert, APRN - CNP   clopidogrel (PLAVIX) 75 MG tablet Take 1 tablet by mouth daily 6/14/21   Earnstine Dessert, APRN - CNP   esomeprazole (NEXIUM) 20 MG delayed release capsule Take 1 capsule by mouth every morning (before breakfast) 6/14/21   Earnstine Dessert, APRN - CNP   magnesium oxide (MAG-OX) 400 MG tablet Take 1 tablet by mouth daily 6/14/21   Ellynstine Dessert, APRN - CNP       Future Appointments   Date Time Provider Shireen Charles   8/16/2021  2:00 PM Laurence Villarreal APRN - CNP [place] [facility]   8/17/2021  1:30 PM Makeda Hall MD [facility]      and   General Assessment

## 2021-07-23 ENCOUNTER — CARE COORDINATION (OUTPATIENT)
Dept: CARE COORDINATION | Age: 81
End: 2021-07-23

## 2021-07-23 NOTE — CARE COORDINATION
Attempted to contact patient, there was no answer. Patient's phone immediately goes to the voicemail that states that the mailbox is full, and unavailable to leave a message.     Plan of Care  [facility] [place], [facility]. will continue to attempt reach this patient

## 2021-08-06 ENCOUNTER — CARE COORDINATION (OUTPATIENT)
Dept: CARE COORDINATION | Age: 81
End: 2021-08-06

## 2021-08-06 NOTE — CARE COORDINATION
Attempting to reach patient for a follow up care coordination call. Left detailed message for the patient to return my call with any questions or concerns. Also wished patient a happy birthday on his VM. Will attempt again in 7-10 days if no return call.    Rochelle Minor RN, ambulatory care manager

## 2021-08-09 ENCOUNTER — APPOINTMENT (OUTPATIENT)
Dept: GENERAL RADIOLOGY | Age: 81
DRG: 641 | End: 2021-08-09
Payer: MEDICARE

## 2021-08-09 ENCOUNTER — HOSPITAL ENCOUNTER (INPATIENT)
Age: 81
LOS: 3 days | Discharge: HOME | DRG: 641 | End: 2021-08-12
Attending: EMERGENCY MEDICINE | Admitting: INTERNAL MEDICINE
Payer: MEDICARE

## 2021-08-09 DIAGNOSIS — N18.31 STAGE 3A CHRONIC KIDNEY DISEASE (HCC): ICD-10-CM

## 2021-08-09 DIAGNOSIS — R42 DIZZINESS: Primary | ICD-10-CM

## 2021-08-09 DIAGNOSIS — E87.1 HYPONATREMIA: ICD-10-CM

## 2021-08-09 PROBLEM — R11.10 VOMITING: Status: ACTIVE | Noted: 2021-08-09

## 2021-08-09 LAB
ABSOLUTE EOS #: 0.11 K/UL (ref 0–0.44)
ABSOLUTE IMMATURE GRANULOCYTE: <0.03 K/UL (ref 0–0.3)
ABSOLUTE LYMPH #: 1.22 K/UL (ref 1.1–3.7)
ABSOLUTE MONO #: 0.61 K/UL (ref 0.1–1.2)
ALBUMIN SERPL-MCNC: 4.1 G/DL (ref 3.5–5.2)
ALBUMIN/GLOBULIN RATIO: 1.4 (ref 1–2.5)
ALP BLD-CCNC: 109 U/L (ref 40–129)
ALT SERPL-CCNC: 44 U/L (ref 5–41)
ANION GAP SERPL CALCULATED.3IONS-SCNC: 12 MMOL/L (ref 9–17)
ANION GAP SERPL CALCULATED.3IONS-SCNC: 9 MMOL/L (ref 9–17)
AST SERPL-CCNC: 37 U/L
BASOPHILS # BLD: 0 % (ref 0–2)
BASOPHILS ABSOLUTE: <0.03 K/UL (ref 0–0.2)
BILIRUB SERPL-MCNC: 0.45 MG/DL (ref 0.3–1.2)
BILIRUBIN URINE: NEGATIVE
BNP INTERPRETATION: ABNORMAL
BUN BLDV-MCNC: 8 MG/DL (ref 8–23)
BUN BLDV-MCNC: 9 MG/DL (ref 8–23)
BUN/CREAT BLD: ABNORMAL (ref 9–20)
BUN/CREAT BLD: ABNORMAL (ref 9–20)
CALCIUM SERPL-MCNC: 8.8 MG/DL (ref 8.6–10.4)
CALCIUM SERPL-MCNC: 9.3 MG/DL (ref 8.6–10.4)
CHLORIDE BLD-SCNC: 93 MMOL/L (ref 98–107)
CHLORIDE BLD-SCNC: 97 MMOL/L (ref 98–107)
CO2: 22 MMOL/L (ref 20–31)
CO2: 22 MMOL/L (ref 20–31)
COLOR: YELLOW
COMMENT UA: NORMAL
CORTISOL COLLECTION INFO: NORMAL
CORTISOL: 8.3 UG/DL (ref 2.7–18.4)
CREAT SERPL-MCNC: 1.01 MG/DL (ref 0.7–1.2)
CREAT SERPL-MCNC: 1.03 MG/DL (ref 0.7–1.2)
DIFFERENTIAL TYPE: ABNORMAL
EOSINOPHILS RELATIVE PERCENT: 2 % (ref 1–4)
GFR AFRICAN AMERICAN: >60 ML/MIN
GFR AFRICAN AMERICAN: >60 ML/MIN
GFR NON-AFRICAN AMERICAN: >60 ML/MIN
GFR NON-AFRICAN AMERICAN: >60 ML/MIN
GFR SERPL CREATININE-BSD FRML MDRD: ABNORMAL ML/MIN/{1.73_M2}
GLUCOSE BLD-MCNC: 98 MG/DL (ref 70–99)
GLUCOSE BLD-MCNC: 99 MG/DL (ref 70–99)
GLUCOSE URINE: NEGATIVE
HCT VFR BLD CALC: 42.4 % (ref 40.7–50.3)
HEMOGLOBIN: 14.1 G/DL (ref 13–17)
IMMATURE GRANULOCYTES: 0 %
KETONES, URINE: NEGATIVE
LEUKOCYTE ESTERASE, URINE: NEGATIVE
LYMPHOCYTES # BLD: 17 % (ref 24–43)
MCH RBC QN AUTO: 30.7 PG (ref 25.2–33.5)
MCHC RBC AUTO-ENTMCNC: 33.3 G/DL (ref 28.4–34.8)
MCV RBC AUTO: 92.4 FL (ref 82.6–102.9)
MONOCYTES # BLD: 9 % (ref 3–12)
NITRITE, URINE: NEGATIVE
NRBC AUTOMATED: 0 PER 100 WBC
OSMOLALITY URINE: 224 MOSM/KG (ref 80–1300)
PDW BLD-RTO: 13.7 % (ref 11.8–14.4)
PH UA: 8 (ref 5–8)
PLATELET # BLD: 215 K/UL (ref 138–453)
PLATELET ESTIMATE: ABNORMAL
PMV BLD AUTO: 8.7 FL (ref 8.1–13.5)
POTASSIUM SERPL-SCNC: 4.5 MMOL/L (ref 3.7–5.3)
POTASSIUM SERPL-SCNC: 4.6 MMOL/L (ref 3.7–5.3)
PRO-BNP: 513 PG/ML
PROTEIN UA: NEGATIVE
RBC # BLD: 4.59 M/UL (ref 4.21–5.77)
RBC # BLD: ABNORMAL 10*6/UL
SEG NEUTROPHILS: 72 % (ref 36–65)
SEGMENTED NEUTROPHILS ABSOLUTE COUNT: 5.08 K/UL (ref 1.5–8.1)
SERUM OSMOLALITY: 272 MOSM/KG (ref 275–295)
SODIUM BLD-SCNC: 127 MMOL/L (ref 135–144)
SODIUM BLD-SCNC: 128 MMOL/L (ref 135–144)
SODIUM BLD-SCNC: 132 MMOL/L (ref 135–144)
SODIUM,UR: 57 MMOL/L
SPECIFIC GRAVITY UA: 1.01 (ref 1–1.03)
TOTAL PROTEIN: 7 G/DL (ref 6.4–8.3)
TROPONIN INTERP: NORMAL
TROPONIN T: NORMAL NG/ML
TROPONIN, HIGH SENSITIVITY: 19 NG/L (ref 0–22)
TSH SERPL DL<=0.05 MIU/L-ACNC: 2.75 MIU/L (ref 0.3–5)
TURBIDITY: CLEAR
URINE HGB: NEGATIVE
UROBILINOGEN, URINE: NORMAL
WBC # BLD: 7.1 K/UL (ref 3.5–11.3)
WBC # BLD: ABNORMAL 10*3/UL

## 2021-08-09 PROCEDURE — 83930 ASSAY OF BLOOD OSMOLALITY: CPT

## 2021-08-09 PROCEDURE — 36415 COLL VENOUS BLD VENIPUNCTURE: CPT

## 2021-08-09 PROCEDURE — 2580000003 HC RX 258: Performed by: STUDENT IN AN ORGANIZED HEALTH CARE EDUCATION/TRAINING PROGRAM

## 2021-08-09 PROCEDURE — 82533 TOTAL CORTISOL: CPT

## 2021-08-09 PROCEDURE — 99285 EMERGENCY DEPT VISIT HI MDM: CPT

## 2021-08-09 PROCEDURE — 6360000002 HC RX W HCPCS: Performed by: STUDENT IN AN ORGANIZED HEALTH CARE EDUCATION/TRAINING PROGRAM

## 2021-08-09 PROCEDURE — 85025 COMPLETE CBC W/AUTO DIFF WBC: CPT

## 2021-08-09 PROCEDURE — 81003 URINALYSIS AUTO W/O SCOPE: CPT

## 2021-08-09 PROCEDURE — 99222 1ST HOSP IP/OBS MODERATE 55: CPT | Performed by: INTERNAL MEDICINE

## 2021-08-09 PROCEDURE — 83935 ASSAY OF URINE OSMOLALITY: CPT

## 2021-08-09 PROCEDURE — 84443 ASSAY THYROID STIM HORMONE: CPT

## 2021-08-09 PROCEDURE — 84484 ASSAY OF TROPONIN QUANT: CPT

## 2021-08-09 PROCEDURE — 93005 ELECTROCARDIOGRAM TRACING: CPT | Performed by: STUDENT IN AN ORGANIZED HEALTH CARE EDUCATION/TRAINING PROGRAM

## 2021-08-09 PROCEDURE — 80048 BASIC METABOLIC PNL TOTAL CA: CPT

## 2021-08-09 PROCEDURE — 93005 ELECTROCARDIOGRAM TRACING: CPT | Performed by: EMERGENCY MEDICINE

## 2021-08-09 PROCEDURE — 6370000000 HC RX 637 (ALT 250 FOR IP): Performed by: STUDENT IN AN ORGANIZED HEALTH CARE EDUCATION/TRAINING PROGRAM

## 2021-08-09 PROCEDURE — 1200000000 HC SEMI PRIVATE

## 2021-08-09 PROCEDURE — 71045 X-RAY EXAM CHEST 1 VIEW: CPT

## 2021-08-09 PROCEDURE — 80053 COMPREHEN METABOLIC PANEL: CPT

## 2021-08-09 PROCEDURE — 84295 ASSAY OF SERUM SODIUM: CPT

## 2021-08-09 PROCEDURE — 83880 ASSAY OF NATRIURETIC PEPTIDE: CPT

## 2021-08-09 PROCEDURE — 84300 ASSAY OF URINE SODIUM: CPT

## 2021-08-09 RX ORDER — POLYETHYLENE GLYCOL 3350 17 G/17G
17 POWDER, FOR SOLUTION ORAL DAILY PRN
Status: DISCONTINUED | OUTPATIENT
Start: 2021-08-09 | End: 2021-08-12 | Stop reason: HOSPADM

## 2021-08-09 RX ORDER — SODIUM CHLORIDE 0.9 % (FLUSH) 0.9 %
5-40 SYRINGE (ML) INJECTION PRN
Status: DISCONTINUED | OUTPATIENT
Start: 2021-08-09 | End: 2021-08-12 | Stop reason: HOSPADM

## 2021-08-09 RX ORDER — ONDANSETRON 2 MG/ML
4 INJECTION INTRAMUSCULAR; INTRAVENOUS EVERY 6 HOURS PRN
Status: DISCONTINUED | OUTPATIENT
Start: 2021-08-09 | End: 2021-08-12 | Stop reason: HOSPADM

## 2021-08-09 RX ORDER — ATORVASTATIN CALCIUM 80 MG/1
40 TABLET, FILM COATED ORAL DAILY
Status: DISCONTINUED | OUTPATIENT
Start: 2021-08-09 | End: 2021-08-12 | Stop reason: HOSPADM

## 2021-08-09 RX ORDER — ACETAMINOPHEN 650 MG/1
650 SUPPOSITORY RECTAL EVERY 6 HOURS PRN
Status: DISCONTINUED | OUTPATIENT
Start: 2021-08-09 | End: 2021-08-12 | Stop reason: HOSPADM

## 2021-08-09 RX ORDER — METOPROLOL TARTRATE 50 MG/1
25 TABLET, FILM COATED ORAL 2 TIMES DAILY
Status: DISCONTINUED | OUTPATIENT
Start: 2021-08-09 | End: 2021-08-10

## 2021-08-09 RX ORDER — BUTALBITAL, ACETAMINOPHEN AND CAFFEINE 50; 325; 40 MG/1; MG/1; MG/1
1 TABLET ORAL EVERY 4 HOURS PRN
Status: DISCONTINUED | OUTPATIENT
Start: 2021-08-09 | End: 2021-08-12 | Stop reason: HOSPADM

## 2021-08-09 RX ORDER — ACETAMINOPHEN 325 MG/1
650 TABLET ORAL EVERY 6 HOURS PRN
Status: DISCONTINUED | OUTPATIENT
Start: 2021-08-09 | End: 2021-08-12 | Stop reason: HOSPADM

## 2021-08-09 RX ORDER — PANTOPRAZOLE SODIUM 40 MG/1
40 TABLET, DELAYED RELEASE ORAL
Status: DISCONTINUED | OUTPATIENT
Start: 2021-08-10 | End: 2021-08-12 | Stop reason: HOSPADM

## 2021-08-09 RX ORDER — SODIUM CHLORIDE 9 MG/ML
25 INJECTION, SOLUTION INTRAVENOUS PRN
Status: DISCONTINUED | OUTPATIENT
Start: 2021-08-09 | End: 2021-08-12 | Stop reason: HOSPADM

## 2021-08-09 RX ORDER — ONDANSETRON 4 MG/1
4 TABLET, ORALLY DISINTEGRATING ORAL EVERY 8 HOURS PRN
Status: DISCONTINUED | OUTPATIENT
Start: 2021-08-09 | End: 2021-08-12 | Stop reason: HOSPADM

## 2021-08-09 RX ORDER — CLOPIDOGREL BISULFATE 75 MG/1
75 TABLET ORAL DAILY
Status: DISCONTINUED | OUTPATIENT
Start: 2021-08-09 | End: 2021-08-12 | Stop reason: HOSPADM

## 2021-08-09 RX ORDER — SODIUM CHLORIDE 0.9 % (FLUSH) 0.9 %
5-40 SYRINGE (ML) INJECTION EVERY 12 HOURS SCHEDULED
Status: DISCONTINUED | OUTPATIENT
Start: 2021-08-09 | End: 2021-08-12 | Stop reason: HOSPADM

## 2021-08-09 RX ORDER — 0.9 % SODIUM CHLORIDE 0.9 %
1000 INTRAVENOUS SOLUTION INTRAVENOUS ONCE
Status: COMPLETED | OUTPATIENT
Start: 2021-08-09 | End: 2021-08-09

## 2021-08-09 RX ORDER — NITROGLYCERIN 0.4 MG/1
0.4 TABLET SUBLINGUAL EVERY 5 MIN PRN
Status: DISCONTINUED | OUTPATIENT
Start: 2021-08-09 | End: 2021-08-12 | Stop reason: HOSPADM

## 2021-08-09 RX ADMIN — CLOPIDOGREL 75 MG: 75 TABLET, FILM COATED ORAL at 22:51

## 2021-08-09 RX ADMIN — CARBIDOPA AND LEVODOPA 1 TABLET: 25; 100 TABLET ORAL at 22:53

## 2021-08-09 RX ADMIN — ENOXAPARIN SODIUM 40 MG: 40 INJECTION SUBCUTANEOUS at 22:52

## 2021-08-09 RX ADMIN — ATORVASTATIN CALCIUM 40 MG: 80 TABLET, FILM COATED ORAL at 22:51

## 2021-08-09 RX ADMIN — METOPROLOL TARTRATE 25 MG: 50 TABLET, FILM COATED ORAL at 22:52

## 2021-08-09 RX ADMIN — SODIUM CHLORIDE, PRESERVATIVE FREE 10 ML: 5 INJECTION INTRAVENOUS at 22:55

## 2021-08-09 RX ADMIN — MAGNESIUM GLUCONATE 500 MG ORAL TABLET 400 MG: 500 TABLET ORAL at 22:51

## 2021-08-09 RX ADMIN — BUTALBITAL, ACETAMINOPHEN AND CAFFEINE 1 TABLET: 50; 325; 40 TABLET ORAL at 22:52

## 2021-08-09 RX ADMIN — SODIUM CHLORIDE 1000 ML: 9 INJECTION, SOLUTION INTRAVENOUS at 12:41

## 2021-08-09 ASSESSMENT — ENCOUNTER SYMPTOMS
COLOR CHANGE: 0
DIARRHEA: 0
NAUSEA: 0
CHEST TIGHTNESS: 0
COUGH: 0
BACK PAIN: 0
BLOOD IN STOOL: 0
VOMITING: 0
WHEEZING: 0
SHORTNESS OF BREATH: 0
ABDOMINAL DISTENTION: 0
STRIDOR: 0
TROUBLE SWALLOWING: 0
ABDOMINAL PAIN: 0

## 2021-08-09 ASSESSMENT — PAIN DESCRIPTION - PAIN TYPE
TYPE: ACUTE PAIN
TYPE: ACUTE PAIN

## 2021-08-09 ASSESSMENT — PAIN DESCRIPTION - PROGRESSION
CLINICAL_PROGRESSION: GRADUALLY WORSENING
CLINICAL_PROGRESSION: NOT CHANGED

## 2021-08-09 ASSESSMENT — PAIN DESCRIPTION - DESCRIPTORS
DESCRIPTORS: HEADACHE
DESCRIPTORS: HEADACHE

## 2021-08-09 ASSESSMENT — PAIN DESCRIPTION - FREQUENCY
FREQUENCY: CONTINUOUS
FREQUENCY: CONTINUOUS

## 2021-08-09 ASSESSMENT — PAIN DESCRIPTION - LOCATION
LOCATION: HEAD
LOCATION: HEAD

## 2021-08-09 ASSESSMENT — PAIN SCALES - GENERAL
PAINLEVEL_OUTOF10: 7
PAINLEVEL_OUTOF10: 6
PAINLEVEL_OUTOF10: 5

## 2021-08-09 ASSESSMENT — PAIN DESCRIPTION - ONSET
ONSET: ON-GOING
ONSET: ON-GOING

## 2021-08-09 NOTE — ED PROVIDER NOTES
[facility]     Emergency Department     Faculty Note/ Attestation      Pt Name: Mane Talbot                                       MRN: [id number]  [place] 1940  Date of evaluation: 8/9/2021  Patients PCP:    JW Shoemaker CNP    Attestation  I performed a history and physical examination of the patient/ or directly observed  and discussed management with the resident. I reviewed the residents note and agree with the documented findings and plan of care. Any areas of disagreement are noted on the chart. I was personally present for the key portions of any procedures. I have documented in the chart those procedures where I was not present during the key portions. I have reviewed the emergency nurses triage note. I agree with the chief complaint, past medical history, past surgical history, allergies, medications, social and family history as documented unless otherwise noted below. For Physician Assistant/ Nurse Practitioner cases/documentation I have personally evaluated this patient and have completed at least one if not all key elements of the E/M (history, physical exam, and MDM). Additional findings are as noted. This patient was evaluated in the Emergency Department for symptoms described in the history of present illness. The patient was evaluated in the context of the global COVID-19 pandemic, which necessitated consideration that the patient might be at risk for infection with the SARS-CoV-2 virus that causes COVID-19. Institutional protocols and algorithms that pertain to the evaluation of patients at risk for COVID-19 are in a state of rapid change based on information released by regulatory bodies including the CDC and federal and state organizations. These policies and algorithms were followed during the patient's care in the ED.      Initial Screens:        Good Coma Scale  Eye Opening: Spontaneous  Best Verbal Response: Oriented  Best Motor Response: Obeys commands  Good Coma Scale Score: 15    Vitals:    Vitals:    08/09/21 1137 08/09/21 1144   BP: (!) 158/135    Pulse: 66    Resp: 16    Temp:  98.6 °F (37 °C)   TempSrc: Oral Oral   SpO2: 98%    Weight: 180 lb (81.6 kg)        Chief Complaint      Chief Complaint   Patient presents with    Dizziness          weight is 180 lb (81.6 kg). His oral temperature is 98.6 °F (37 °C). His blood pressure is 158/135 (abnormal) and his pulse is 66. His respiration is 16 and oxygen saturation is 98%. DIAGNOSTIC RESULTS       RADIOLOGY:   XR CHEST PORTABLE   Final Result   No acute cardiopulmonary process.                LABS:  Labs Reviewed   CBC WITH AUTO DIFFERENTIAL - Abnormal; Notable for the following components:       Result Value    Seg Neutrophils 72 (*)     Lymphocytes 17 (*)     All other components within normal limits   COMPREHENSIVE METABOLIC PANEL W/ REFLEX TO MG FOR LOW K - Abnormal; Notable for the following components:    Sodium 127 (*)     Chloride 93 (*)     ALT 44 (*)     All other components within normal limits   BRAIN NATRIURETIC PEPTIDE - Abnormal; Notable for the following components:    Pro- (*)     All other components within normal limits   TROPONIN   URINALYSIS         EMERGENCY DEPARTMENT COURSE:     -------------------------      BP: (!) 158/135, Temp: 98.6 °F (37 °C), Pulse: 66, Resp: 16    System Problem List     Patient Active Problem List   Diagnosis    Temporary loss of eyesight    Impaired renal function    Syncope : posterior circulation stroke vs Neurocardiogenic syncope     Intracranial bleed : traumatic left sub-dural hematoma ,managed conservatively in 2011    Headache    CKD (chronic kidney disease) stage 3, GFR 30-59 ml/min ([facility])    Depression    Hyperlipidemia    Microhematuria    Microalbuminuria    Essential hypertension    Generalized abdominal pain    Tubular adenoma of colon    Diverticulosis of colon    History of skull fracture  NSTEMI (non-ST elevated myocardial infarction) ([facility])    Nausea    Pure hypercholesterolemia    Prediabetes    Parkinson disease ([facility])    Chronic post-traumatic headache, not intractable    Fall (on) (from) other stairs and steps, initial encounter    Strain of iliopsoas muscle, initial encounter    Acute pain of left knee    Closed fracture of second toe of right foot    Closed fracture of second toe of left foot    Abnormal ECG    Cerebrovascular accident [facility])    Chest pain, unspecified    Hematoma of scalp    Left ventricular hypertrophy    Mild aortic valve regurgitation    Pulmonary hypertension, mild ([facility])    Lumbar radiculopathy         Comments  Chronic Prob List noted          Zuly Damico MD,, MD, F.A.C.E.P.   Attending Emergency Physician         Zuly Damico MD  08/09/21 4331

## 2021-08-09 NOTE — ED NOTES
The following labs labeled with pt sticker and tubed:     [] Lavender   [] on ice   [] Blue   [] Green/yellow  [] Green/black [] on ice  [] Pink  [] Red  [] Yellow       [] COVID-19 swab    [] Rapid     [x] Urine Sample  [] Pelvic Cultures    [] Blood Cultures            Leslie Santos RN  08/09/21 9639

## 2021-08-09 NOTE — PROGRESS NOTES
I signed up for this patient in error. I did not see this patient. I did not participate in the evlauation or treatment of this patient.     Electronically signed by Skip Barfield DO on 8/9/2021 at 7:16 PM

## 2021-08-09 NOTE — ED NOTES
Report given to Annabella Handley RN. All questions answered.       Priscila Owusu RN  08/09/21 8480

## 2021-08-09 NOTE — CARE COORDINATION
Case Management Initial Discharge [address],             Met with:patient to discuss discharge plans. Information verified: address, contacts, phone number, , insurance Yes  Insurance Provider:     Emergency Contact/Next of Kin name & number: cousin Daniela Wright and Son Zelalem Valencia as per face sheet  Who are involved in patient's support system? pts son and neighbor Christian Farrell    PCP: Flossie Hamman, APRN - CNP  Date of last visit: a month ago      Discharge Planning    Living Arrangements:    lives alone     Home has 2 stories  4 stairs to climb to get into front door, flight of stairs to climb to reach second floor  Location of bedroom/bathroom in home main    Patient able to perform ADL's:Independent    Current Services (outpatient & in home) DME  DME equipment: cane  DME provider: na    Is patient receiving oral anticoagulation therapy? No    If indicated:   Physician managing anticoagulation treatment: none  Where does patient obtain lab work for ATC treatment? na      Potential Assistance Needed:       Patient agreeable to home care: No  [place] of choice provided:  n/a    Prior SNF/Rehab Placement and Facility: none  Agreeable to SNF/Rehab: No  [place] of choice provided: n/a     Evaluation: no    Expected Discharge date:       Patient expects to be discharged to: If home: is the family and/or caregiver wiling & able to provide support at home? yes  Who will be providing this support? Neighbor or son*    Follow Up Appointment: Best Day/ Time:      Transportation provider: [address]  Transportation arrangements needed for discharge: No    Readmission Risk              Risk of Unplanned Readmission:  0             Does patient have a readmission risk score greater than 14?: No  If yes, follow-up appointment must be made within 7 days of discharge.      Goals of Care: self care      Educated pt on transitional options, provided freedom of choice and are agreeable with plan      Discharge Plan: home independent, has transport          Electronically signed by Maurice Castano RN on 8/9/21 at 3:44 PM EDT

## 2021-08-09 NOTE — Clinical Note
Patient Class: Inpatient [101]   REQUIRED: Diagnosis: Dizziness [466180]   Estimated Length of Stay: Estimated stay of less than 2 midnights

## 2021-08-09 NOTE — ED PROVIDER NOTES
[address] ED  Emergency Department  Faculty Sign-Out Addendum     Care of Edith Marshall was assumed from previous attending and is being seen for Dizziness  . The patient's initial evaluation and plan have been discussed with the prior provider who initially evaluated the patient. Handoff taken on the following patient from prior Attending Physician:    Khalida Villeda    I was available and discussed any additional care issues that arose and coordinated the management plans with the resident(s) caring for the patient during my duty period. Any areas of disagreement with residents documentation of care or procedures are noted on the chart. I was personally present for the key portions of any/all procedures during my duty period. I have documented in the chart those procedures where I was not present during the key portions.       EMERGENCY DEPARTMENT COURSE / MEDICAL DECISION MAKING:       MEDICATIONS GIVEN:  Orders Placed This Encounter   Medications    0.9 % sodium chloride bolus       LABS / RADIOLOGY:     Labs Reviewed   CBC WITH AUTO DIFFERENTIAL - Abnormal; Notable for the following components:       Result Value    Seg Neutrophils 72 (*)     Lymphocytes 17 (*)     All other components within normal limits   COMPREHENSIVE METABOLIC PANEL W/ REFLEX TO MG FOR LOW K - Abnormal; Notable for the following components:    Sodium 127 (*)     Chloride 93 (*)     ALT 44 (*)     All other components within normal limits   BRAIN NATRIURETIC PEPTIDE - Abnormal; Notable for the following components:    Pro- (*)     All other components within normal limits   OSMOLALITY - Abnormal; Notable for the following components:    Serum Osmolality 272 (*)     All other components within normal limits   TROPONIN   URINALYSIS   OSMOLALITY, URINE   BASIC METABOLIC PANEL W/ REFLEX TO MG FOR LOW K   SODIUM, URINE, RANDOM       XR CHEST PORTABLE    Result Date: 8/9/2021  EXAMINATION: ONE XRAY VIEW OF THE CHEST 8/9/2021 12:01 pm COMPARISON: March 20, 2020 HISTORY: ORDERING SYSTEM PROVIDED HISTORY: dizziness TECHNOLOGIST PROVIDED HISTORY: dizziness Acuity: Unknown Type of Exam: Unknown FINDINGS: Stable cardiomediastinal silhouette. No pulmonary venous congestion or edema. No focal lung consolidation or infiltrate. No pleural effusion or pneumothorax. Osseous structures grossly intact. No acute cardiopulmonary process. RECENT VITALS:     Temp: 98.6 °F (37 °C),  Pulse: 76, Resp: 19, BP: (!) 151/80, SpO2: 97 %    This patient is a 79 y.o. Male with generalized weakness and fatigue. Labs show abnormality of sodium of 127. No edema on x-ray. Plan is admission    OUTSTANDING TASKS / RECOMMENDATIONS:    1.  Segundo West MD, Juan Hogan  Attending Emergency Physician  [address]  ED        Pepe Dodd MD  08/09/21 5785

## 2021-08-09 NOTE — ED TRIAGE NOTES
Pt presents to ED from home c/o dizziness and headache x 1 month. Pt states he was seen by PCP and they ordered him vitamin B12 but otherwise did not treat sx. Pt reports headache pain as 7/10 currently. Denies n/v/d, SOB, c/p, LOC, or any other sx. Pt is A&Ox4, placed on full monitor, changed into gown, EKG done and IV established.

## 2021-08-09 NOTE — ED PROVIDER NOTES
[facility]  ED  Emergency Department Encounter  EmergencyMedicine Resident     Pt Name:Mina Pacheco  MRN: [id number]  [place] 1940  Date of evaluation: 8/9/21  PCP:  JW Pak CNP    This patient was evaluated in the Emergency Department for symptoms described in the history of present illness. The patient was evaluated in the context of the global COVID-19 pandemic, which necessitated consideration that the patient might be at risk for infection with the SARS-CoV-2 virus that causes COVID-19. Institutional protocols and algorithms that pertain to the evaluation of patients at risk for COVID-19 are in a state of rapid change based on information released by regulatory bodies including the CDC and federal and state organizations. These policies and algorithms were followed during the patient's care in the ED. CHIEF COMPLAINT       Chief Complaint   Patient presents with    Dizziness       HISTORY OF PRESENT ILLNESS  (Location/Symptom, Timing/Onset, Context/Setting, Quality, Duration, Modifying Factors, Severity.)      Gregg Meyers is a 79 y.o. male who presents with dizziness and lightheadedness. He has a history of Parkinson's disease treated with carbidopa levodopa. Patient states that he has been feeling generally lightheaded and kind of woozy for the past couple of days or so. Patient has not had an episode of loss of consciousness he has not had any episodes of shortness of breath chest pain. He does state that he has one episode of vomiting earlier today. Patient does not have any abdominal pain, he does not have any dysuria urinary discomfort no blood in his bowel movements or other constitutional symptoms. Patient is otherwise doing well and has no other complaints.     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 has a past surgical history that includes Colonoscopy (11/02/2012); shoulder surgery (Right); pr colsc flx w/rmvl of tumor polyp lesion snare tq (N/A, 9/19/2017); and Colonoscopy (09/19/2017). Social History     Socioeconomic History    Marital status: Single     Spouse name: Not on file    Number of children: Not on file    Years of education: Not on file    Highest education level: Not on file   Occupational History    Not on file   Tobacco Use    Smoking status: Former Smoker    Smokeless tobacco: Never Used   Vaping Use    Vaping Use: Never used   Substance and Sexual Activity    Alcohol use: No    Drug use: No    Sexual activity: Not Currently   Other Topics Concern    Not on file   Social History Narrative    Not on file     Social Determinants of Health     Financial Resource Strain: Low Risk     Difficulty of Paying Living Expenses: Not very hard   Food Insecurity: No Food Insecurity    Worried About Running Out of Food in the Last Year: Never true    Destiny of Food in the Last Year: Never true   Transportation Needs: Unknown    Lack of Transportation (Medical): Patient refused    Lack of Transportation (Non-Medical): Patient refused   Physical Activity: Insufficiently Active    Days of Exercise per Week: 2 days    Minutes of Exercise per Session: 20 min   Stress: No Stress Concern Present    Feeling of Stress :  Only a little   Social Connections:     Frequency of Communication with Friends and Family:     Frequency of Social Gatherings with Friends and Family:     Attends [employer] Services:     Active Member of Clubs or Organizations:     Attends Club or Organization Meetings:     Marital Status:    Intimate Partner Violence:     Fear of Current or Ex-Partner:     Emotionally Abused:     Physically Abused:     Sexually Abused:        Family History   Problem Relation Age of Onset    No Known Problems Mother     No Known Problems Father        Allergies:  Dye [iodides] and Aspirin    Home Medications:  Prior to Admission medications    Medication Sig Start Date End Date Taking? Authorizing Provider   metoprolol tartrate (LOPRESSOR) 25 MG tablet take 1/2 tablet by mouth twice a day for hypertension 6/14/21   JW Reilly CNP   nitroGLYCERIN (NITROSTAT) 0.4 MG SL tablet Place 1 tablet under the tongue every 5 minutes as needed for Chest pain If no relief of chest pain after 3rd dose, go to the ER or call 911 6/14/21   JW Reilly CNP   atorvastatin (LIPITOR) 40 MG tablet take 1 tablet by mouth once daily 6/14/21   JW Reilly CNP   allopurinol (ZYLOPRIM) 100 MG tablet Take 1 tablet by mouth daily 6/14/21   JW Reilly CNP   acetaminophen (TYLENOL 8 HOUR ARTHRITIS PAIN) 650 MG extended release tablet Take 1 tablet by mouth every 8 hours as needed for Pain 6/14/21   JW Reilly CNP   butalbital-acetaminophen-caffeine (FIORICET, ESGIC) -40 MG per tablet Take 1 tab prn only for severe headache. Can repeat after 4 hrs if needed. Max 2 tabs/24 hrs.  Max 4 tabs/week 6/14/21   JW Reilly CNP   carbidopa-levodopa (SINEMET)  MG per tablet Take 1 tablet by mouth 4 times daily 6/14/21 9/12/21  JW Reilly CNP   clopidogrel (PLAVIX) 75 MG tablet Take 1 tablet by mouth daily 6/14/21   JW Reilly CNP   esomeprazole (NEXIUM) 20 MG delayed release capsule Take 1 capsule by mouth every morning (before breakfast) 6/14/21   JW Reilly CNP   magnesium oxide (MAG-OX) 400 MG tablet Take 1 tablet by mouth daily 6/14/21   Olga Lidia Foster APRN - CNP       REVIEW OF SYSTEMS    (2-9 systems for level 4, 10 or more for level 5)      Review of Systems   Constitutional: Negative for chills, fatigue and fever. HENT: Negative for congestion and trouble swallowing. Eyes: Negative for visual disturbance. Respiratory: Negative for cough, chest tightness, shortness of breath, wheezing and stridor. Gastrointestinal: Negative for abdominal distention, abdominal pain, blood in stool, diarrhea, nausea and vomiting. Genitourinary: Negative for dysuria, flank pain, hematuria and urgency. Musculoskeletal: Negative for back pain and myalgias. Skin: Negative for color change. Neurological: Positive for dizziness and light-headedness. Negative for syncope, weakness and headaches. PHYSICAL EXAM   (up to 7 for level 4, 8 or more for level 5)      INITIAL VITALS:   BP (!) 151/80   Pulse 76   Temp 98.6 °F (37 °C) (Oral)   Resp 19   Wt 180 lb (81.6 kg)   SpO2 97%   BMI 28.19 kg/m²     Physical Exam  Constitutional:       Appearance: He is well-developed. HENT:      Head: Normocephalic and atraumatic. Nose: Nose normal.      Mouth/Throat:      Mouth: Mucous membranes are moist.   Eyes:      Conjunctiva/sclera: Conjunctivae normal.   Cardiovascular:      Rate and Rhythm: Normal rate and regular rhythm. Heart sounds: Normal heart sounds. No murmur heard. No friction rub. Pulmonary:      Effort: Pulmonary effort is normal. No respiratory distress. Breath sounds: Normal breath sounds. No rales. Chest:      Chest wall: No tenderness. Abdominal:      General: Abdomen is flat. Palpations: Abdomen is soft. There is no hepatomegaly, splenomegaly or pulsatile mass. Tenderness: There is no abdominal tenderness. Hernia: No hernia is present. Musculoskeletal:         General: No swelling, tenderness, deformity or signs of injury. Normal range of motion.    Skin:     General: Skin is warm. Capillary Refill: Capillary refill takes less than 2 seconds. Neurological:      General: No focal deficit present. Mental Status: He is alert and oriented to person, place, and time. Mental status is at baseline. Motor: No weakness.       Comments: patient has a baseline tremor, history of Parkinson's disease   Psychiatric:         Mood and Affect: Mood normal.         DIFFERENTIAL  DIAGNOSIS     PLAN (LABS / IMAGING / EKG):  Orders Placed This Encounter   Procedures    XR CHEST PORTABLE    CBC Auto Differential    Comprehensive Metabolic Panel w/ Reflex to MG    Troponin    Brain Natriuretic Peptide    Urinalysis, reflex to microscopic    Osmolality, Urine    Osmolality    Basic Metabolic Panel w/ Reflex to MG    Sodium, urine, random    Inpatient consult to Internal Medicine    EKG 12 Lead    PATIENT STATUS (FROM ED OR OR/PROCEDURAL) Inpatient       MEDICATIONS ORDERED:  Orders Placed This Encounter   Medications    0.9 % sodium chloride bolus       DDX: Central vertigo peripheral vertigo dehydration electrolyte abnormalities, hyperkalemia hypokalemia hyponatremia  hypernatremia    DIAGNOSTIC RESULTS / EMERGENCY DEPARTMENT COURSE / MDM   LAB RESULTS:  Results for orders placed or performed during the hospital encounter of 08/09/21   CBC Auto Differential   Result Value Ref Range    WBC 7.1 3.5 - 11.3 k/uL    RBC 4.59 4.21 - 5.77 m/uL    Hemoglobin 14.1 13.0 - 17.0 g/dL    Hematocrit 42.4 40.7 - 50.3 %    MCV 92.4 82.6 - 102.9 fL    MCH 30.7 25.2 - 33.5 pg    MCHC 33.3 28.4 - 34.8 g/dL    RDW 13.7 11.8 - 14.4 %    Platelets 081 430 - 386 k/uL    MPV 8.7 8.1 - 13.5 fL    NRBC Automated 0.0 0.0 per 100 WBC    Differential Type NOT REPORTED     Seg Neutrophils 72 (H) 36 - 65 %    Lymphocytes 17 (L) 24 - 43 %    Monocytes 9 3 - 12 %    Eosinophils % 2 1 - 4 %    Basophils 0 0 - 2 %    Immature Granulocytes 0 0 %    Segs Absolute 5.08 1.50 - 8.10 k/uL    Absolute Lymph # 1.22 1.10 - 3.70 k/uL    Absolute Mono # 0.61 0.10 - 1.20 k/uL    Absolute Eos # 0.11 0.00 - 0.44 k/uL    Basophils Absolute <0.03 0.00 - 0.20 k/uL    Absolute Immature Granulocyte <0.03 0.00 - 0.30 k/uL    WBC Morphology NOT REPORTED     RBC Morphology NOT REPORTED     Platelet Estimate NOT REPORTED    Comprehensive Metabolic Panel w/ Reflex to MG   Result Value Ref Range    Glucose 99 70 - 99 mg/dL    BUN 9 8 - 23 mg/dL    CREATININE 1.03 0.70 - 1.20 mg/dL    Bun/Cre Ratio NOT REPORTED 9 - 20    Calcium 9.3 8.6 - 10.4 mg/dL    Sodium 127 (L) 135 - 144 mmol/L    Potassium 4.6 3.7 - 5.3 mmol/L    Chloride 93 (L) 98 - 107 mmol/L    CO2 22 20 - 31 mmol/L    Anion Gap 12 9 - 17 mmol/L    Alkaline Phosphatase 109 40 - 129 U/L    ALT 44 (H) 5 - 41 U/L    AST 37 <40 U/L    Total Bilirubin 0.45 0.3 - 1.2 mg/dL    Total Protein 7.0 6.4 - 8.3 g/dL    Albumin 4.1 3.5 - 5.2 g/dL    Albumin/Globulin Ratio 1.4 1.0 - 2.5    GFR Non-African American >60 >60 mL/min    GFR African American >60 >60 mL/min    GFR Comment          GFR Staging NOT REPORTED    Troponin   Result Value Ref Range    Troponin, High Sensitivity 19 0 - 22 ng/L    Troponin T NOT REPORTED <0.03 ng/mL    Troponin Interp NOT REPORTED    Brain Natriuretic Peptide   Result Value Ref Range    Pro- (H) <300 pg/mL    BNP Interpretation Pro-BNP Reference Range:    Urinalysis, reflex to microscopic   Result Value Ref Range    Color, UA YELLOW YELLOW    Turbidity UA CLEAR CLEAR    Glucose, Ur NEGATIVE NEGATIVE    Bilirubin Urine NEGATIVE NEGATIVE    Ketones, Urine NEGATIVE NEGATIVE    Specific Gravity, UA 1.007 1.005 - 1.030    Urine Hgb NEGATIVE NEGATIVE    pH, UA 8.0 5.0 - 8.0    Protein, UA NEGATIVE NEGATIVE    Urobilinogen, Urine Normal Normal    Nitrite, Urine NEGATIVE NEGATIVE    Leukocyte Esterase, Urine NEGATIVE NEGATIVE    Urinalysis Comments       Microscopic exam not performed based on chemical results unless requested in original order.    Osmolality, Urine Result Value Ref Range    Osmolality, Ur 224 80 - 1300 mOsm/kg   Osmolality   Result Value Ref Range    Serum Osmolality 272 (L) 275 - 295 mOsm/kg   Basic Metabolic Panel w/ Reflex to MG   Result Value Ref Range    Glucose 98 70 - 99 mg/dL    BUN 8 8 - 23 mg/dL    CREATININE 1.01 0.70 - 1.20 mg/dL    Bun/Cre Ratio NOT REPORTED 9 - 20    Calcium 8.8 8.6 - 10.4 mg/dL    Sodium 128 (L) 135 - 144 mmol/L    Potassium 4.5 3.7 - 5.3 mmol/L    Chloride 97 (L) 98 - 107 mmol/L    CO2 22 20 - 31 mmol/L    Anion Gap 9 9 - 17 mmol/L    GFR Non-African American >60 >60 mL/min    GFR African American >60 >60 mL/min    GFR Comment          GFR Staging NOT REPORTED    Sodium, urine, random   Result Value Ref Range    Sodium,Ur 57 mmol/L         RADIOLOGY:  XR CHEST PORTABLE    Result Date: 8/9/2021  No acute cardiopulmonary process. EKG  EKG Interpretation    Interpreted by emergency department physician    Rhythm: normal sinus   Rate: normal  Axis: normal  Ectopy: none  Conduction: right bundle branch block (incomplete)  ST Segments: no acute change  T Waves: no acute change  Q Waves: none    Clinical Impression: no acute changes and non-specific EKG    Gerardo Carlson DO      All EKG's are interpreted by the Emergency Department Physician who either signs or Co-signs this chart in the absence of a cardiologist.    EMERGENCY DEPARTMENT COURSE:  ED Course as of Aug 09 1830   Mon Aug 09, 2021   1316 Sodium(!): 127 [KK]   1432 Serum Osmolality(!): 272 [KK]   1432 Osmolality, Ur: 224 [KK]   1432 Serum Osmolality(!): 272 [KK]   1648 Sodium, Ur: 57 [KK]      ED Course User Index  301 Davy Rodriguez DO             CONSULTS:  IP CONSULT TO INTERNAL MEDICINE  IP CONSULT TO CASE MANAGEMENT        Assessment/Plan: Patient is an 67-year-old male with hyponatremia and symptoms of dizziness. Patient states that he drinks adequate amount of water. However patient states that his symptoms have been going on for a while.   Labs suggest hyponatremia at one [age], with active symptoms and patient being 79years old, patient requires admission for reevaluation and monitoring of electrolytes. Patient has been given 1 L of fluid in the ER today symptoms have not really improved, stat repeat BMP  shows a sodium of 128. Patient was admitted to internal medicine for further evaluation of potential etiology for hyponatremia. FINAL IMPRESSION      1. Dizziness    2. Hyponatremia          DISPOSITION / PLAN     DISPOSITION Admitted 08/09/2021 06:17:43 PM      PATIENT REFERRED TO:  No follow-up provider specified.     DISCHARGE MEDICATIONS:  Current Discharge Medication List          Vi Guo DO  Emergency Medicine Resident    (Please note that portions of thisnote were completed with a voice recognition program.  Efforts were made to edit the dictations but occasionally words are mis-transcribed.)        Kurt Cutler DO  Resident  08/09/21 [id number]

## 2021-08-10 LAB
ABSOLUTE EOS #: 0.24 K/UL (ref 0–0.44)
ABSOLUTE IMMATURE GRANULOCYTE: 0.03 K/UL (ref 0–0.3)
ABSOLUTE LYMPH #: 1.55 K/UL (ref 1.1–3.7)
ABSOLUTE MONO #: 0.7 K/UL (ref 0.1–1.2)
ANION GAP SERPL CALCULATED.3IONS-SCNC: 11 MMOL/L (ref 9–17)
BASOPHILS # BLD: 1 % (ref 0–2)
BASOPHILS ABSOLUTE: 0.03 K/UL (ref 0–0.2)
BUN BLDV-MCNC: 10 MG/DL (ref 8–23)
BUN/CREAT BLD: ABNORMAL (ref 9–20)
CALCIUM SERPL-MCNC: 8.8 MG/DL (ref 8.6–10.4)
CHLORIDE BLD-SCNC: 98 MMOL/L (ref 98–107)
CO2: 22 MMOL/L (ref 20–31)
CREAT SERPL-MCNC: 1.36 MG/DL (ref 0.7–1.2)
DIFFERENTIAL TYPE: ABNORMAL
EKG ATRIAL RATE: 59 BPM
EKG ATRIAL RATE: 60 BPM
EKG ATRIAL RATE: 61 BPM
EKG ATRIAL RATE: 61 BPM
EKG ATRIAL RATE: 62 BPM
EKG P AXIS: 114 DEGREES
EKG P AXIS: 18 DEGREES
EKG P AXIS: 8 DEGREES
EKG P AXIS: 86 DEGREES
EKG P AXIS: 87 DEGREES
EKG P-R INTERVAL: 134 MS
EKG P-R INTERVAL: 170 MS
EKG P-R INTERVAL: 172 MS
EKG P-R INTERVAL: 178 MS
EKG P-R INTERVAL: 198 MS
EKG Q-T INTERVAL: 386 MS
EKG Q-T INTERVAL: 422 MS
EKG Q-T INTERVAL: 424 MS
EKG Q-T INTERVAL: 426 MS
EKG Q-T INTERVAL: 426 MS
EKG QRS DURATION: 102 MS
EKG QRS DURATION: 102 MS
EKG QRS DURATION: 104 MS
EKG QRS DURATION: 84 MS
EKG QRS DURATION: 96 MS
EKG QTC CALCULATION (BAZETT): 391 MS
EKG QTC CALCULATION (BAZETT): 421 MS
EKG QTC CALCULATION (BAZETT): 424 MS
EKG QTC CALCULATION (BAZETT): 426 MS
EKG QTC CALCULATION (BAZETT): 426 MS
EKG R AXIS: 0 DEGREES
EKG R AXIS: 23 DEGREES
EKG R AXIS: 41 DEGREES
EKG R AXIS: 43 DEGREES
EKG R AXIS: 46 DEGREES
EKG T AXIS: -19 DEGREES
EKG T AXIS: -23 DEGREES
EKG T AXIS: -37 DEGREES
EKG T AXIS: -9 DEGREES
EKG VENTRICULAR RATE: 59 BPM
EKG VENTRICULAR RATE: 60 BPM
EKG VENTRICULAR RATE: 61 BPM
EKG VENTRICULAR RATE: 61 BPM
EKG VENTRICULAR RATE: 62 BPM
EOSINOPHILS RELATIVE PERCENT: 4 % (ref 1–4)
GFR AFRICAN AMERICAN: >60 ML/MIN
GFR NON-AFRICAN AMERICAN: 50 ML/MIN
GFR SERPL CREATININE-BSD FRML MDRD: ABNORMAL ML/MIN/{1.73_M2}
GFR SERPL CREATININE-BSD FRML MDRD: ABNORMAL ML/MIN/{1.73_M2}
GLUCOSE BLD-MCNC: 99 MG/DL (ref 70–99)
HCT VFR BLD CALC: 41.1 % (ref 40.7–50.3)
HEMOGLOBIN: 13.5 G/DL (ref 13–17)
IMMATURE GRANULOCYTES: 1 %
LYMPHOCYTES # BLD: 27 % (ref 24–43)
MCH RBC QN AUTO: 30.3 PG (ref 25.2–33.5)
MCHC RBC AUTO-ENTMCNC: 32.8 G/DL (ref 28.4–34.8)
MCV RBC AUTO: 92.4 FL (ref 82.6–102.9)
MONOCYTES # BLD: 12 % (ref 3–12)
NRBC AUTOMATED: 0 PER 100 WBC
PDW BLD-RTO: 13.9 % (ref 11.8–14.4)
PLATELET # BLD: 194 K/UL (ref 138–453)
PLATELET ESTIMATE: ABNORMAL
PMV BLD AUTO: 9 FL (ref 8.1–13.5)
POTASSIUM SERPL-SCNC: 5 MMOL/L (ref 3.7–5.3)
RBC # BLD: 4.45 M/UL (ref 4.21–5.77)
RBC # BLD: ABNORMAL 10*6/UL
SEG NEUTROPHILS: 55 % (ref 36–65)
SEGMENTED NEUTROPHILS ABSOLUTE COUNT: 3.11 K/UL (ref 1.5–8.1)
SODIUM BLD-SCNC: 131 MMOL/L (ref 135–144)
WBC # BLD: 5.7 K/UL (ref 3.5–11.3)
WBC # BLD: ABNORMAL 10*3/UL

## 2021-08-10 PROCEDURE — 6360000002 HC RX W HCPCS: Performed by: STUDENT IN AN ORGANIZED HEALTH CARE EDUCATION/TRAINING PROGRAM

## 2021-08-10 PROCEDURE — 93010 ELECTROCARDIOGRAM REPORT: CPT | Performed by: INTERNAL MEDICINE

## 2021-08-10 PROCEDURE — 97166 OT EVAL MOD COMPLEX 45 MIN: CPT

## 2021-08-10 PROCEDURE — 97535 SELF CARE MNGMENT TRAINING: CPT

## 2021-08-10 PROCEDURE — 93005 ELECTROCARDIOGRAM TRACING: CPT | Performed by: INTERNAL MEDICINE

## 2021-08-10 PROCEDURE — 99232 SBSQ HOSP IP/OBS MODERATE 35: CPT | Performed by: INTERNAL MEDICINE

## 2021-08-10 PROCEDURE — 80048 BASIC METABOLIC PNL TOTAL CA: CPT

## 2021-08-10 PROCEDURE — 2580000003 HC RX 258: Performed by: STUDENT IN AN ORGANIZED HEALTH CARE EDUCATION/TRAINING PROGRAM

## 2021-08-10 PROCEDURE — 6370000000 HC RX 637 (ALT 250 FOR IP): Performed by: STUDENT IN AN ORGANIZED HEALTH CARE EDUCATION/TRAINING PROGRAM

## 2021-08-10 PROCEDURE — 1200000000 HC SEMI PRIVATE

## 2021-08-10 PROCEDURE — 36415 COLL VENOUS BLD VENIPUNCTURE: CPT

## 2021-08-10 PROCEDURE — 85025 COMPLETE CBC W/AUTO DIFF WBC: CPT

## 2021-08-10 PROCEDURE — 94760 N-INVAS EAR/PLS OXIMETRY 1: CPT

## 2021-08-10 RX ORDER — SODIUM CHLORIDE 9 MG/ML
INJECTION, SOLUTION INTRAVENOUS CONTINUOUS
Status: DISCONTINUED | OUTPATIENT
Start: 2021-08-10 | End: 2021-08-11

## 2021-08-10 RX ADMIN — MAGNESIUM GLUCONATE 500 MG ORAL TABLET 400 MG: 500 TABLET ORAL at 08:45

## 2021-08-10 RX ADMIN — ENOXAPARIN SODIUM 40 MG: 40 INJECTION SUBCUTANEOUS at 08:45

## 2021-08-10 RX ADMIN — ATORVASTATIN CALCIUM 40 MG: 80 TABLET, FILM COATED ORAL at 08:45

## 2021-08-10 RX ADMIN — PANTOPRAZOLE SODIUM 40 MG: 40 TABLET, DELAYED RELEASE ORAL at 05:50

## 2021-08-10 RX ADMIN — CARBIDOPA AND LEVODOPA 1 TABLET: 25; 100 TABLET ORAL at 14:17

## 2021-08-10 RX ADMIN — CARBIDOPA AND LEVODOPA 1 TABLET: 25; 100 TABLET ORAL at 18:36

## 2021-08-10 RX ADMIN — BUTALBITAL, ACETAMINOPHEN AND CAFFEINE 1 TABLET: 50; 325; 40 TABLET ORAL at 14:18

## 2021-08-10 RX ADMIN — METOPROLOL TARTRATE 12.5 MG: 25 TABLET ORAL at 21:35

## 2021-08-10 RX ADMIN — METOPROLOL TARTRATE 25 MG: 50 TABLET, FILM COATED ORAL at 08:45

## 2021-08-10 RX ADMIN — CLOPIDOGREL 75 MG: 75 TABLET, FILM COATED ORAL at 08:45

## 2021-08-10 RX ADMIN — SODIUM CHLORIDE: 9 INJECTION, SOLUTION INTRAVENOUS at 08:52

## 2021-08-10 RX ADMIN — CARBIDOPA AND LEVODOPA 1 TABLET: 25; 100 TABLET ORAL at 08:44

## 2021-08-10 RX ADMIN — CARBIDOPA AND LEVODOPA 1 TABLET: 25; 100 TABLET ORAL at 21:35

## 2021-08-10 RX ADMIN — ONDANSETRON 4 MG: 4 TABLET, ORALLY DISINTEGRATING ORAL at 15:31

## 2021-08-10 RX ADMIN — BUTALBITAL, ACETAMINOPHEN AND CAFFEINE 1 TABLET: 50; 325; 40 TABLET ORAL at 21:35

## 2021-08-10 ASSESSMENT — PAIN SCALES - GENERAL
PAINLEVEL_OUTOF10: 5
PAINLEVEL_OUTOF10: 2
PAINLEVEL_OUTOF10: 5

## 2021-08-10 NOTE — H&P
[address]     Department of Internal Medicine - Staff Internal Medicine Teaching Service          ADMISSION NOTE/HISTORY AND PHYSICAL EXAMINATION   Date: 8/9/2021  Patient Name: Zehra Black  Date of admission: 8/9/2021 11:30 AM  YOB: 1940  PCP: JW Oden CNP  History Obtained From: EMR and patient    CHIEF COMPLAINT     Chief complaint: Dizziness and headache    HISTORY OF PRESENTING ILLNESS     The patient is a pleasant 79 y.o. male presents with a chief complaint of dizziness weakness and headache for the last 1 month which has been increased for the last couple of days. Dizziness is episodic and has no particular association. No tinnitus or hearing loss reported. Headache is 5/10, throbbing, lower the head and is chronic since his fall his Subdural Hematoma Back in 2011. Patient has a history of bilateral upper extremity tremors at rest.  Patient denies any chest pain, loss of consciousness, dyspnea, funny sensation in the chest, current head trauma, fall, abdominal pain, dysuria blood in stools or other constitutional symptoms. Patient is otherwise well and has no other complaints. Patient has history of coronary artery disease prediabetes, hypertension, cerebrovascular event, hyperlipidemia, left-ventricular hypertrophy, Parkinson's disease on Sinemet, CKD stage III, intracranial bleed: Traumatic left subdural hematoma, depression and diverticulosis. On arrival patient is vitally stable, saturating on room air. Metabolic panel and blood counts are normal except sodium of 127 and proBNP of 513. Chest x-ray shows no acute pulmonary process.         Review of Systems:  General ROS: Completed and except as mentioned above were negative   HEENT ROS: Completed and except as mentioned above were negative   Allergy and Immunology ROS:  Completed and except as mentioned above were negative  Hematological and Lymphatic ROS:  Completed and except as mentioned above were negative  Respiratory ROS:  Completed and except as mentioned above were negative  Cardiovascular ROS:  Completed and except as mentioned above were negative  Gastrointestinal ROS: Completed and except as mentioned above were negative  Genito-Urinary ROS:  Completed and except as mentioned above were negative  Musculoskeletal ROS:  Completed and except as mentioned above were negative  Neurological ROS:  Completed and except as mentioned above were negative  Skin & Dermatological ROS:  Completed and except as mentioned above were negative  Psychological ROS:  Completed and except as mentioned above were negative    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 ([facility] 3/18/2011    d/t being hurt on the job    CKD (chronic kidney disease) stage 2, GFR 60-89 ml/min     CKD (chronic kidney disease) stage 3, GFR 30-59 ml/min ([facility]     Diverticulosis of colon     GERD (gastroesophageal reflux disease)     Impaired renal function     MRSA (methicillin resistant staph aureus) culture positive 9/23/2015    leg    Osteoarthritis of knee     Left    Parkinson disease ([facility]     Proteinuria     Skull fracture ([facility] [address] [facility] 3/18/2011    d/t 12-foot drop on the job    Temporary loss of eyesight     periodically, happened x7    Tubular adenoma of colon 2012, 2017    mulitple       PAST SURGICAL HISTORY     Past Surgical History:   Procedure Laterality Date    COLONOSCOPY  11/02/2012    poor prep, tubular adenoma    COLONOSCOPY  09/19/2017    diverticulosis, multiple polyps as described, spot marking done, pathology-tubular adenoma x 4    CA COLSC FLX W/RMVL OF TUMOR POLYP LESION SNARE TQ N/A 9/19/2017    COLONOSCOPY POLYPECTOMY SNARE/COLD BIOPSY with tatooing and apc transverse colon performed by Ivelisse Vivas MD at [address]     rotator cuff       ALLERGIES     Dye [iodides] and Aspirin    MEDICATIONS PRIOR TO ADMISSION     Prior to Admission medications    Medication Sig Start Date End Date Taking? Authorizing Provider   metoprolol tartrate (LOPRESSOR) 25 MG tablet take 1/2 tablet by mouth twice a day for hypertension 6/14/21   JW Villareal CNP   nitroGLYCERIN (NITROSTAT) 0.4 MG SL tablet Place 1 tablet under the tongue every 5 minutes as needed for Chest pain If no relief of chest pain after 3rd dose, go to the ER or call 911 6/14/21   JW Villareal CNP   atorvastatin (LIPITOR) 40 MG tablet take 1 tablet by mouth once daily 6/14/21   JW Villareal CNP   allopurinol (ZYLOPRIM) 100 MG tablet Take 1 tablet by mouth daily 6/14/21   JW Villareal CNP   acetaminophen (TYLENOL 8 HOUR ARTHRITIS PAIN) 650 MG extended release tablet Take 1 tablet by mouth every 8 hours as needed for Pain 6/14/21   JW Villareal CNP   butalbital-acetaminophen-caffeine (FIORICET, ESGIC) -40 MG per tablet Take 1 tab prn only for severe headache. Can repeat after 4 hrs if needed. Max 2 tabs/24 hrs.  Max 4 tabs/week 6/14/21   JW Villareal CNP   carbidopa-levodopa (SINEMET)  MG per tablet Take 1 tablet by mouth 4 times daily 6/14/21 9/12/21  JW Villareal CNP   clopidogrel (PLAVIX) 75 MG tablet Take 1 tablet by mouth daily 6/14/21   JW Villareal CNP   esomeprazole (NEXIUM) 20 MG delayed release capsule Take 1 capsule by mouth every morning (before breakfast) 6/14/21   JW Villareal CNP   magnesium oxide (MAG-OX) 400 MG tablet Take 1 tablet by mouth daily 6/14/21   JW Villareal CNP       SOCIAL HISTORY     Tobacco:  No tobacco use  Alcohol: no alcohol  Illicits: none  Occupation:     FAMILY HISTORY     Family History   Problem Relation Age of Onset    No Known Problems Mother     No Known Problems Father        PHYSICAL EXAM     Vitals: BP (!) 137/56   Pulse 74   Temp 97.9 °F (36.6 °C) (Oral)   Resp 20   Ht 5' 7\" (1.702 m)   Wt 167 lb 5.3 oz (75.9 kg)   SpO2 99%   BMI 26.21 kg/m²   Tmax: Temp (24hrs), Av.3 °F (36.8 °C), Min:97.9 °F (36.6 °C), Max:98.6 °F (37 °C)    Last Body weight:   Wt Readings from Last 3 Encounters:   21 167 lb 5.3 oz (75.9 kg)   21 175 lb (79.4 kg)   21 180 lb (81.6 kg)     Body Mass Index : Body mass index is 26.21 kg/m². PHYSICAL EXAMINATION:  Constitutional: Alert, oriented, cooperative and in no apparent distress. Head:normocephalic and atraumatic. EENT:  PERRLA. No conjunctival injections. Septum was midline, mucosa was without erythema, exudates or cobblestoning. No thrush was noted. Neck: Supple without thyromegaly. No elevated JVP. Trachea was midline. Respiratory: Chest was symmetrical without dullness to percussion. Breath sounds bilaterally were clear to auscultation. There were no wheezes, rhonchi or rales. There is no intercostal retraction or use of accessory muscles. No egophony noted. Cardiovascular: Regular without murmur, clicks, gallops or rubs. Abdomen: Slightly rounded and soft without organomegaly. No rebound, rigidity or guarding was appreciated. Lymphatic: No lymphadenopathy. Musculoskeletal: Normal curvature of the spine. No gross muscle weakness. Extremities: Bilateral upper extremity resting tremors, no lower extremity edema, ulcerations, tenderness, varicosities or erythema. Muscle size, tone and strength are normal.  No involuntary movements are noted. Skin:  Warm and dry. Good color, turgor and pigmentation. No lesions or scars.   No cyanosis or clubbing  Neurological/Psychiatric: The patient's general behavior, level of consciousness, thought content and emotional status is normal.          INVESTIGATIONS     Laboratory Testing:     Recent Results (from the past 24 hour(s))   CBC Auto Differential    Collection Time: 21 12:01 PM   Result Value Ref Range    WBC 7.1 3.5 - 11.3 k/uL    RBC 4.59 4.21 - 5.77 m/uL    Hemoglobin 14.1 13.0 - 17.0 g/dL    Hematocrit 42.4 40.7 - 50.3 %    MCV 92.4 82.6 - 102.9 fL    MCH 30.7 25.2 - 33.5 pg    MCHC 33.3 28.4 - 34.8 g/dL    RDW 13.7 11.8 - 14.4 %    Platelets 728 804 - 771 k/uL    MPV 8.7 8.1 - 13.5 fL    NRBC Automated 0.0 0.0 per 100 WBC    Differential Type NOT REPORTED     Seg Neutrophils 72 (H) 36 - 65 %    Lymphocytes 17 (L) 24 - 43 %    Monocytes 9 3 - 12 %    Eosinophils % 2 1 - 4 %    Basophils 0 0 - 2 %    Immature Granulocytes 0 0 %    Segs Absolute 5.08 1.50 - 8.10 k/uL    Absolute Lymph # 1.22 1.10 - 3.70 k/uL    Absolute Mono # 0.61 0.10 - 1.20 k/uL    Absolute Eos # 0.11 0.00 - 0.44 k/uL    Basophils Absolute <0.03 0.00 - 0.20 k/uL    Absolute Immature Granulocyte <0.03 0.00 - 0.30 k/uL    WBC Morphology NOT REPORTED     RBC Morphology NOT REPORTED     Platelet Estimate NOT REPORTED    Comprehensive Metabolic Panel w/ Reflex to MG    Collection Time: 08/09/21 12:01 PM   Result Value Ref Range    Glucose 99 70 - 99 mg/dL    BUN 9 8 - 23 mg/dL    CREATININE 1.03 0.70 - 1.20 mg/dL    Bun/Cre Ratio NOT REPORTED 9 - 20    Calcium 9.3 8.6 - 10.4 mg/dL    Sodium 127 (L) 135 - 144 mmol/L    Potassium 4.6 3.7 - 5.3 mmol/L    Chloride 93 (L) 98 - 107 mmol/L    CO2 22 20 - 31 mmol/L    Anion Gap 12 9 - 17 mmol/L    Alkaline Phosphatase 109 40 - 129 U/L    ALT 44 (H) 5 - 41 U/L    AST 37 <40 U/L    Total Bilirubin 0.45 0.3 - 1.2 mg/dL    Total Protein 7.0 6.4 - 8.3 g/dL    Albumin 4.1 3.5 - 5.2 g/dL    Albumin/Globulin Ratio 1.4 1.0 - 2.5    GFR Non-African American >60 >60 mL/min    GFR African American >60 >60 mL/min    GFR Comment          GFR Staging NOT REPORTED    Troponin    Collection Time: 08/09/21 12:01 PM   Result Value Ref Range    Troponin, High Sensitivity 19 0 - 22 ng/L    Troponin T NOT REPORTED <0.03 ng/mL    Troponin Interp NOT REPORTED    Brain Natriuretic Peptide    Collection Time: 08/09/21 12:01 PM   Result Value Ref Range    Pro- (H) <300 pg/mL    BNP Interpretation Pro-BNP Reference Range:    Osmolality    Collection Time: 08/09/21 12:01 PM   Result Value Ref Range    Serum Osmolality 272 (L) 275 - 295 mOsm/kg   Urinalysis, reflex to microscopic    Collection Time: 08/09/21 12:34 PM   Result Value Ref Range    Color, UA YELLOW YELLOW    Turbidity UA CLEAR CLEAR    Glucose, Ur NEGATIVE NEGATIVE    Bilirubin Urine NEGATIVE NEGATIVE    Ketones, Urine NEGATIVE NEGATIVE    Specific Gravity, UA 1.007 1.005 - 1.030    Urine Hgb NEGATIVE NEGATIVE    pH, UA 8.0 5.0 - 8.0    Protein, UA NEGATIVE NEGATIVE    Urobilinogen, Urine Normal Normal    Nitrite, Urine NEGATIVE NEGATIVE    Leukocyte Esterase, Urine NEGATIVE NEGATIVE    Urinalysis Comments       Microscopic exam not performed based on chemical results unless requested in original order.    Osmolality, Urine    Collection Time: 08/09/21 12:34 PM   Result Value Ref Range    Osmolality, Ur 224 80 - 1300 mOsm/kg   Sodium, urine, random    Collection Time: 08/09/21 12:34 PM   Result Value Ref Range    Sodium,Ur 57 mmol/L   Basic Metabolic Panel w/ Reflex to MG    Collection Time: 08/09/21  3:30 PM   Result Value Ref Range    Glucose 98 70 - 99 mg/dL    BUN 8 8 - 23 mg/dL    CREATININE 1.01 0.70 - 1.20 mg/dL    Bun/Cre Ratio NOT REPORTED 9 - 20    Calcium 8.8 8.6 - 10.4 mg/dL    Sodium 128 (L) 135 - 144 mmol/L    Potassium 4.5 3.7 - 5.3 mmol/L    Chloride 97 (L) 98 - 107 mmol/L    CO2 22 20 - 31 mmol/L    Anion Gap 9 9 - 17 mmol/L    GFR Non-African American >60 >60 mL/min    GFR African American >60 >60 mL/min    GFR Comment          GFR Staging NOT REPORTED    Cortisol Total    Collection Time: 08/09/21  3:30 PM   Result Value Ref Range    Cortisol 8.3 2.7 - 18.4 ug/dL    Cortisol Collection Info NOT REPORTED    TSH with Reflex    Collection Time: 08/09/21  3:30 PM   Result Value Ref Range    TSH 2.75 0.30 - 5.00 mIU/L   EKG 12 Lead    Collection Time: 08/09/21  9:48 PM   Result

## 2021-08-10 NOTE — PLAN OF CARE
Problem: Pain:  Goal: Pain level will decrease  Description: Pain level will decrease  Outcome: Ongoing  Goal: Control of acute pain  Description: Control of acute pain  Outcome: Ongoing  Goal: Control of chronic pain  Description: Control of chronic pain  Outcome: Ongoing     Problem: Falls - Risk of:  Goal: Will remain free from falls  Description: Will remain free from falls  Outcome: Ongoing  Goal: Absence of physical injury  Description: Absence of physical injury  Outcome: Ongoing     Problem: Fluid Volume:  Goal: Ability to achieve a balanced intake and output will improve  Description: Ability to achieve a balanced intake and output will improve  Outcome: Ongoing     Problem: Mobility - Impaired:  Goal: Mobility will improve  Description: Mobility will improve  Outcome: Ongoing

## 2021-08-10 NOTE — PROGRESS NOTES
Physician Progress Note      [facility] #:                  [id number]  :                       1940  ADMIT DATE:       2021 11:30 AM  [address] [place] DATE:  RESPONDING  PROVIDER #:        Sole Patel          QUERY TEXT:    Pt admitted with hyponatremia. Pt noted to have increase in creatinine. If   possible, please document in the progress notes and discharge summary if you   are evaluating and/or treating any of the following: The medical record reflects the following:  Risk Factors: CKD  Clinical Indicators: 8/10 creat 1.36> 1.01 on , meets KDIGO criteria for   TAMARA see below  Treatment: IV fluids, labs, monitoring    Defined by Kidney Disease Improving Global Outcomes (KDIGO) clinical practice   guideline for acute kidney injury:  -Increase in SCr by greater than or equal to 0.3 mg/dl within 48 hours; or  -Increase in SCr to greater than or equal to 1.5 times baseline, which is   known or presumed to have occurred within the prior 7 days; or  -Urine volume < 0.5ml/kg/h for 6 hours    Call if any questions. Thank you, Shawn Woodall, [address] [place] [address]  Options provided:  -- Acute kidney failure  -- Acute kidney injury  -- Abnormal lab finding only  -- Other - I will add my own diagnosis  -- Disagree - Not applicable / Not valid  -- Disagree - Clinically unable to determine / Unknown  -- Refer to Clinical Documentation Reviewer    PROVIDER RESPONSE TEXT:    This patient has an Acute kidney injury.     Query created by: Hilda Snyder on 8/10/2021 9:34 AM      Electronically signed by:  Sole Patel 8/10/2021 11:37 AM

## 2021-08-10 NOTE — PLAN OF CARE
Problem: Pain:  Goal: Pain level will decrease  Description: Pain level will decrease  8/10/2021 1429 by Cherylene Just, RN  Outcome: Ongoing  8/10/2021 0511 by Dago Merritt RN  Outcome: Ongoing  Goal: Control of acute pain  Description: Control of acute pain  8/10/2021 1429 by Cherylene Just, RN  Outcome: Ongoing  8/10/2021 0511 by Dago Merritt RN  Outcome: Ongoing  Goal: Control of chronic pain  Description: Control of chronic pain  8/10/2021 1429 by Cherylene Just, RN  Outcome: Ongoing  8/10/2021 0511 by Dago Merritt RN  Outcome: Ongoing     Problem: Falls - Risk of:  Goal: Will remain free from falls  Description: Will remain free from falls  8/10/2021 1429 by Cherylene Just, RN  Outcome: Ongoing  8/10/2021 0511 by Dago Merritt RN  Outcome: Ongoing  Goal: Absence of physical injury  Description: Absence of physical injury  8/10/2021 1429 by Cherylene Just, RN  Outcome: Ongoing  8/10/2021 0511 by Dago Merritt RN  Outcome: Ongoing     Problem: Fluid Volume:  Goal: Ability to achieve a balanced intake and output will improve  Description: Ability to achieve a balanced intake and output will improve  8/10/2021 1429 by Cherylene Just, RN  Outcome: Ongoing  8/10/2021 0511 by Dago Merritt RN  Outcome: Ongoing     Problem: Mobility - Impaired:  Goal: Mobility will improve  Description: Mobility will improve  8/10/2021 1429 by Cherylene Just, RN  Outcome: Ongoing  8/10/2021 0511 by Dago Merritt RN  Outcome: Ongoing

## 2021-08-10 NOTE — PROGRESS NOTES
[facility]  Internal Medicine Teaching Residency Program  Inpatient Daily Progress Note  ______________________________________________________________________________    Patient: Hernesto Bravo  YOB: 1940   KVM:[id number]    Acct: [de-identified]     Room: 200/1-46  Admit date: 8/9/2021  Today's date: 08/10/21  Number of days in the hospital: 1    SUBJECTIVE   Admitting Diagnosis: Dizziness    Patient examined at bedside  Dizziness improved  Vitally stable  No acute overnight  Sodium is improving,131  Labs and chart reviewed  Continue IV fluids    ROS:  Constitutional:  negative for chills, fevers, sweats  Respiratory:  negative for cough, dyspnea on exertion, hemoptysis, shortness of breath, wheezing  Cardiovascular:  negative for chest pain, chest pressure/discomfort, lower extremity edema, palpitations  Gastrointestinal:  negative for abdominal pain, constipation, diarrhea, nausea, vomiting  Neurological:  Positive for dizziness, headache  BRIEF HISTORY     The patient is a pleasant 79 y.o. male presents with a chief complaint of dizziness weakness and headache for the last 1 month which has been increased for the last couple of days. Dizziness is episodic and has no particular association. No tinnitus or hearing loss reported. Headache is 5/10, throbbing, lower the head and is chronic since his fall his Subdural Hematoma Back in 2011. Patient has a history of bilateral upper extremity tremors at rest.  Patient denies any chest pain, loss of consciousness, dyspnea, funny sensation in the chest, current head trauma, fall, abdominal pain, dysuria blood in stools or other constitutional symptoms.   Patient is otherwise well and has no other complaints.     Patient has history of coronary artery disease prediabetes, hypertension, cerebrovascular event, hyperlipidemia, left-ventricular hypertrophy, Parkinson's disease on Sinemet, CKD stage III, intracranial bleed: Traumatic left subdural hematoma, depression and diverticulosis.     On arrival patient is vitally stable, saturating on room air. Metabolic panel and blood counts are normal except sodium of 127 and proBNP of 513. Chest x-ray shows no acute pulmonary process. OBJECTIVE     Vital Signs:  BP (!) 137/56   Pulse 65   Temp 97.9 °F (36.6 °C) (Oral)   Resp 20   Ht 5' 7\" (1.702 m)   Wt 167 lb 5.3 oz (75.9 kg)   SpO2 99%   BMI 26.21 kg/m²     Temp (24hrs), Av.3 °F (36.8 °C), Min:97.9 °F (36.6 °C), Max:98.6 °F (37 °C)    In: -   Out: 1300 [Urine:1300]    Physical Exam:  Constitutional:  79y.o. year old male who is alert, oriented, cooperative and in no apparent distress. Head:normocephalic and atraumatic. EENT:  PERRLA. No conjunctival injections. Septum was midline, mucosa was without erythema, exudates or cobblestoning. No thrush was noted. Neck: Supple without thyromegaly. No elevated JVP. Trachea was midline. Respiratory: Chest was symmetrical without dullness to percussion. Breath sounds bilaterally were clear to auscultation. There were no wheezes, rhonchi or rales. There is no intercostal retraction or use of accessory muscles. No egophony noted. Cardiovascular: Regular without murmur, clicks, gallops or rubs. Abdomen: Slightly rounded and soft without organomegaly. No rebound, rigidity or guarding was appreciated. Lymphatic: No lymphadenopathy. Musculoskeletal: Normal curvature of the spine. No gross muscle weakness. Extremities:  No lower extremity edema, ulcerations, tenderness, varicosities or erythema. Muscle size, tone and strength are normal.  Bilateral upper extremity tremors. skin:  Warm and dry. Good color, turgor and pigmentation. No lesions or scars.   No cyanosis or clubbing  Neurological/Psychiatric: The patient's general behavior, level of consciousness, thought content and emotional status is normal.        Medications:  Scheduled Medications:    atorvastatin  40 mg Oral Daily    carbidopa-levodopa  1 tablet Oral 4x Daily    clopidogrel  75 mg Oral Daily    pantoprazole  40 mg Oral QAM AC    magnesium oxide  400 mg Oral Daily    metoprolol tartrate  25 mg Oral BID    sodium chloride flush  5-40 mL Intravenous 2 times per day    enoxaparin  40 mg Subcutaneous Daily     Continuous Infusions:    sodium chloride       PRN Medicationsbutalbital-acetaminophen-caffeine, 1 tablet, Q4H PRN  nitroGLYCERIN, 0.4 mg, Q5 Min PRN  sodium chloride flush, 5-40 mL, PRN  sodium chloride, 25 mL, PRN  ondansetron, 4 mg, Q8H PRN   Or  ondansetron, 4 mg, Q6H PRN  polyethylene glycol, 17 g, Daily PRN  acetaminophen, 650 mg, Q6H PRN   Or  acetaminophen, 650 mg, Q6H PRN        Diagnostic Labs:  CBC:   Recent Labs     08/09/21  1201   WBC 7.1   RBC 4.59   HGB 14.1   HCT 42.4   MCV 92.4   RDW 13.7        BMP:   Recent Labs     08/09/21  1201 08/09/21  1530 08/09/21  2151   * 128* 132*   K 4.6 4.5  --    CL 93* 97*  --    CO2 22 22  --    BUN 9 8  --    CREATININE 1.03 1.01  --      BNP: No results for input(s): BNP in the last 72 hours. PT/INR: No results for input(s): PROTIME, INR in the last 72 hours. APTT: No results for input(s): APTT in the last 72 hours. CARDIAC ENZYMES: No results for input(s): CKMB, CKMBINDEX, TROPONINI in the last 72 hours. Invalid input(s): CKTOTAL;3  FASTING LIPID PANEL:  Lab Results   Component Value Date    CHOL 101 07/20/2020    HDL 54 05/11/2021    TRIG 70 07/20/2020     LIVER PROFILE:   Recent Labs     08/09/21  1201   AST 37   ALT 44*   BILITOT 0.45   ALKPHOS 109      MICROBIOLOGY:   Lab Results   Component Value Date/Time    CULTURE (A) 09/23/2015 04:50 PM     METHICILLIN RESISTANT STAPHYLOCOCCUS AUREUS MODERATE GROWTH    CULTURE  09/23/2015 04:50 PM     [employer] [address] [address] [phone]       Imaging:    XR CHEST PORTABLE    Result Date: 8/9/2021  No acute cardiopulmonary process. ASSESSMENT & PLAN     Hyponatremia :  · Continue IV fluids  · Sodium improving  · Continue to monitor     Chronic posttraumatic headache, not intractable:  · Give Tylenol as needed        Parkinson's disease  · Continue Sinemet  mg  · Ambulate with care     Coronary artery disease  · Continue Lopressor 25 mg, Plavix 75 mg, Lipitor 40 mg and Nitrostat 0.4 mg sublingual if chest pain     Hypertension  · Currently controlled     Prediabetes:  · Currently normal reading     CKD stage 3:  · Monitor output  · Monitor serum creatinine and potassium  · Watch for pulmonary edema        DVT ppx: Lovenox 40 mg daily  GI ppx: Protonix 40 daily     PT/OT/SW: following  Discharge Planning: on board    Brittaney Up MD  Internal Medicine Resident, PGY-1  1 [facility];  [place], [place]  8/10/2021, 5:24 AM

## 2021-08-11 LAB
ABSOLUTE EOS #: 0.1 K/UL (ref 0–0.44)
ABSOLUTE IMMATURE GRANULOCYTE: 0 K/UL (ref 0–0.3)
ABSOLUTE LYMPH #: 0.48 K/UL (ref 1.1–3.7)
ABSOLUTE MONO #: 0.48 K/UL (ref 0.1–1.2)
ANION GAP SERPL CALCULATED.3IONS-SCNC: 12 MMOL/L (ref 9–17)
BASOPHILS # BLD: 0 % (ref 0–2)
BASOPHILS ABSOLUTE: 0 K/UL (ref 0–0.2)
BUN BLDV-MCNC: 20 MG/DL (ref 8–23)
BUN/CREAT BLD: ABNORMAL (ref 9–20)
CALCIUM SERPL-MCNC: 8.4 MG/DL (ref 8.6–10.4)
CHLORIDE BLD-SCNC: 99 MMOL/L (ref 98–107)
CO2: 20 MMOL/L (ref 20–31)
CREAT SERPL-MCNC: 1.39 MG/DL (ref 0.7–1.2)
DIFFERENTIAL TYPE: ABNORMAL
EKG ATRIAL RATE: 61 BPM
EKG P AXIS: 27 DEGREES
EKG P-R INTERVAL: 160 MS
EKG Q-T INTERVAL: 428 MS
EKG QRS DURATION: 104 MS
EKG QTC CALCULATION (BAZETT): 430 MS
EKG R AXIS: 71 DEGREES
EKG T AXIS: -13 DEGREES
EKG VENTRICULAR RATE: 61 BPM
EOSINOPHILS RELATIVE PERCENT: 1 % (ref 1–4)
GFR AFRICAN AMERICAN: 59 ML/MIN
GFR NON-AFRICAN AMERICAN: 49 ML/MIN
GFR SERPL CREATININE-BSD FRML MDRD: ABNORMAL ML/MIN/{1.73_M2}
GFR SERPL CREATININE-BSD FRML MDRD: ABNORMAL ML/MIN/{1.73_M2}
GLUCOSE BLD-MCNC: 131 MG/DL (ref 70–99)
HCT VFR BLD CALC: 39.7 % (ref 40.7–50.3)
HEMOGLOBIN: 12.8 G/DL (ref 13–17)
IMMATURE GRANULOCYTES: 0 %
LYMPHOCYTES # BLD: 5 % (ref 24–43)
MCH RBC QN AUTO: 31 PG (ref 25.2–33.5)
MCHC RBC AUTO-ENTMCNC: 32.2 G/DL (ref 28.4–34.8)
MCV RBC AUTO: 96.1 FL (ref 82.6–102.9)
MONOCYTES # BLD: 5 % (ref 3–12)
MORPHOLOGY: NORMAL
NRBC AUTOMATED: 0 PER 100 WBC
PDW BLD-RTO: 14.2 % (ref 11.8–14.4)
PLATELET # BLD: 159 K/UL (ref 138–453)
PLATELET ESTIMATE: ABNORMAL
PMV BLD AUTO: 8.6 FL (ref 8.1–13.5)
POTASSIUM SERPL-SCNC: 4.6 MMOL/L (ref 3.7–5.3)
RBC # BLD: 4.13 M/UL (ref 4.21–5.77)
RBC # BLD: ABNORMAL 10*6/UL
SEG NEUTROPHILS: 89 % (ref 36–65)
SEGMENTED NEUTROPHILS ABSOLUTE COUNT: 8.44 K/UL (ref 1.5–8.1)
SODIUM BLD-SCNC: 130 MMOL/L (ref 135–144)
SODIUM BLD-SCNC: 131 MMOL/L (ref 135–144)
WBC # BLD: 9.5 K/UL (ref 3.5–11.3)
WBC # BLD: ABNORMAL 10*3/UL

## 2021-08-11 PROCEDURE — 97162 PT EVAL MOD COMPLEX 30 MIN: CPT

## 2021-08-11 PROCEDURE — 2580000003 HC RX 258: Performed by: STUDENT IN AN ORGANIZED HEALTH CARE EDUCATION/TRAINING PROGRAM

## 2021-08-11 PROCEDURE — 80048 BASIC METABOLIC PNL TOTAL CA: CPT

## 2021-08-11 PROCEDURE — 84295 ASSAY OF SERUM SODIUM: CPT

## 2021-08-11 PROCEDURE — 6360000002 HC RX W HCPCS: Performed by: STUDENT IN AN ORGANIZED HEALTH CARE EDUCATION/TRAINING PROGRAM

## 2021-08-11 PROCEDURE — 36415 COLL VENOUS BLD VENIPUNCTURE: CPT

## 2021-08-11 PROCEDURE — 99232 SBSQ HOSP IP/OBS MODERATE 35: CPT | Performed by: INTERNAL MEDICINE

## 2021-08-11 PROCEDURE — 1200000000 HC SEMI PRIVATE

## 2021-08-11 PROCEDURE — 97530 THERAPEUTIC ACTIVITIES: CPT

## 2021-08-11 PROCEDURE — 85025 COMPLETE CBC W/AUTO DIFF WBC: CPT

## 2021-08-11 PROCEDURE — 6370000000 HC RX 637 (ALT 250 FOR IP): Performed by: STUDENT IN AN ORGANIZED HEALTH CARE EDUCATION/TRAINING PROGRAM

## 2021-08-11 RX ADMIN — ENOXAPARIN SODIUM 40 MG: 40 INJECTION SUBCUTANEOUS at 08:37

## 2021-08-11 RX ADMIN — SODIUM CHLORIDE, PRESERVATIVE FREE 10 ML: 5 INJECTION INTRAVENOUS at 23:00

## 2021-08-11 RX ADMIN — CARBIDOPA AND LEVODOPA 1 TABLET: 25; 100 TABLET ORAL at 22:59

## 2021-08-11 RX ADMIN — PANTOPRAZOLE SODIUM 40 MG: 40 TABLET, DELAYED RELEASE ORAL at 05:32

## 2021-08-11 RX ADMIN — CARBIDOPA AND LEVODOPA 1 TABLET: 25; 100 TABLET ORAL at 13:55

## 2021-08-11 RX ADMIN — ATORVASTATIN CALCIUM 40 MG: 80 TABLET, FILM COATED ORAL at 08:36

## 2021-08-11 RX ADMIN — CARBIDOPA AND LEVODOPA 1 TABLET: 25; 100 TABLET ORAL at 08:37

## 2021-08-11 RX ADMIN — BUTALBITAL, ACETAMINOPHEN AND CAFFEINE 1 TABLET: 50; 325; 40 TABLET ORAL at 08:36

## 2021-08-11 RX ADMIN — ACETAMINOPHEN 650 MG: 325 TABLET ORAL at 12:10

## 2021-08-11 RX ADMIN — MAGNESIUM GLUCONATE 500 MG ORAL TABLET 400 MG: 500 TABLET ORAL at 08:37

## 2021-08-11 RX ADMIN — BUTALBITAL, ACETAMINOPHEN AND CAFFEINE 1 TABLET: 50; 325; 40 TABLET ORAL at 13:59

## 2021-08-11 RX ADMIN — CLOPIDOGREL 75 MG: 75 TABLET, FILM COATED ORAL at 08:36

## 2021-08-11 RX ADMIN — CARBIDOPA AND LEVODOPA 1 TABLET: 25; 100 TABLET ORAL at 18:39

## 2021-08-11 ASSESSMENT — PAIN DESCRIPTION - LOCATION
LOCATION: HEAD
LOCATION: HEAD

## 2021-08-11 ASSESSMENT — PAIN SCALES - GENERAL
PAINLEVEL_OUTOF10: 6
PAINLEVEL_OUTOF10: 9
PAINLEVEL_OUTOF10: 9
PAINLEVEL_OUTOF10: 5
PAINLEVEL_OUTOF10: 6
PAINLEVEL_OUTOF10: 8
PAINLEVEL_OUTOF10: 9
PAINLEVEL_OUTOF10: 3

## 2021-08-11 ASSESSMENT — PAIN DESCRIPTION - PAIN TYPE
TYPE: ACUTE PAIN
TYPE: ACUTE PAIN

## 2021-08-11 NOTE — PLAN OF CARE
Problem: Pain:  Goal: Pain level will decrease  Description: Pain level will decrease  8/11/2021 1849 by Carolyn Kelsey RN  Outcome: Ongoing  8/11/2021 0503 by Lucinda Atkinson RN  Outcome: Ongoing  Goal: Control of acute pain  Description: Control of acute pain  8/11/2021 1849 by Carolyn Kelsey RN  Outcome: Ongoing  8/11/2021 0503 by Lucinda Atkinson RN  Outcome: Ongoing  Goal: Control of chronic pain  Description: Control of chronic pain  8/11/2021 1849 by Carolyn Kelsey RN  Outcome: Ongoing  8/11/2021 0503 by Lucinda Atkinson RN  Outcome: Ongoing     Problem: Falls - Risk of:  Goal: Will remain free from falls  Description: Will remain free from falls  8/11/2021 1849 by Carolyn Kelsey RN  Outcome: Ongoing  8/11/2021 0503 by Lucinda Atkinson RN  Outcome: Ongoing  Goal: Absence of physical injury  Description: Absence of physical injury  8/11/2021 1849 by Carolyn Kelsey RN  Outcome: Ongoing  8/11/2021 0503 by Lucinda Atkinson RN  Outcome: Ongoing     Problem: Fluid Volume:  Goal: Ability to achieve a balanced intake and output will improve  Description: Ability to achieve a balanced intake and output will improve  Outcome: Ongoing     Problem: Physical Regulation:  Goal: Ability to maintain clinical measurements within normal limits will improve  Description: Ability to maintain clinical measurements within normal limits will improve  Outcome: Ongoing  Goal: Will show no signs and symptoms of electrolyte imbalance  Description: Will show no signs and symptoms of electrolyte imbalance  Outcome: Ongoing     Problem: Mobility - Impaired:  Goal: Mobility will improve  Description: Mobility will improve  8/11/2021 1849 by Carolyn Kelsey RN  Outcome: Ongoing  8/11/2021 0503 by Lucinda Atkinson RN  Outcome: Ongoing

## 2021-08-11 NOTE — PROGRESS NOTES
Discussed with patient the order for discharge. The patient stated he does not feel \"right\" with complaints of nausea, back pain, a headache and just overall weakness. Patient also stated he contacted his PCP, Dr. Amena Nathan, who wanted our doctors to contact him and order a MRI. Informed the internal med intern and they said to D/C the discharge order and will let the primary team know to contact his PCP. Notified patient about the updated plan of care.     Electronically signed by Lubna Manriquez on 8/11/2021 at 5:33 PM

## 2021-08-11 NOTE — PROGRESS NOTES
Physical Therapy    Facility/Department: [facility]  Initial Assessment    NAME: Gian Waterman  : 1940  MRN: [id number]    Chief Complaint   Patient presents with    Dizziness       Date of Service: 2021    Discharge Recommendations:    Further therapy recommended at discharge. PT Equipment Recommendations  Equipment Needed: No    Assessment   Body structures, Functions, Activity limitations: Decreased functional mobility ; Decreased strength;Decreased endurance;Decreased balance  Assessment: Pt grossly CGA to Gustavo for mobility, amb 80' RW CGA. Pt would benefit from continued acute PT to address deficits. Prognosis: Good  Decision Making: Medium Complexity  PT Education: Plan of Care;PT Role;General Safety  REQUIRES PT FOLLOW UP: Yes  Activity Tolerance  Activity Tolerance: Patient Tolerated treatment well;Patient limited by fatigue;Patient limited by endurance       Patient Diagnosis(es): The primary encounter diagnosis was Dizziness. Diagnoses of Hyponatremia and Stage 3a chronic kidney disease ([address] were also pertinent to this visit. 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 has a past surgical history that includes Colonoscopy (2012); shoulder surgery (Right); pr colsc flx w/rmvl of tumor polyp lesion snare tq (N/A, 2017); and Colonoscopy (2017).     Restrictions  Restrictions/Precautions  Restrictions/Precautions: General Precautions, Fall Risk, Up as Tolerated  Required Braces or Orthoses?: No  Position Activity Restriction  Other position/activity restrictions: Hx of PD  Vision/Hearing  Vision: Impaired  Vision Exceptions: Wears glasses at all times  Hearing: Within functional limits     Subjective  General  Chart Reviewed: Yes  Patient assessed for rehabilitation services?: Yes  Response To Previous Treatment: Not applicable  Family / Caregiver Present: No  Follows Commands: Within Functional Limits  Subjective  Subjective: RN and pt agreeable to PT. Pt alert in bed upon arrival.  Pain Screening  Patient Currently in Pain: Yes  Pain Assessment  Pain Assessment: 0-10  Pain Level: 9  Pain Type: Acute pain  Pain Location: Head  Non-Pharmaceutical Pain Intervention(s): Ambulation/Increased Activity; Distraction; Emotional support  Response to Pain Intervention: Patient Satisfied  Vital Signs  Patient Currently in Pain: Yes  Pre Treatment Pain Screening  Intervention List: Patient able to continue with treatment    Social/Functional History  Social/Functional History  Lives With: Alone  Type of Home: House  Home Layout: Two level, Performs ADL's on one level (Reports occassionally goes upstairs; bed/bath available on main floor)  Home Access: Level entry  Bathroom Shower/Tub: Walk-in shower  Bathroom Toilet: Standard  Home Equipment: Rolling walker, Cane (Uses cane at baseline; has RW but does not use)  Receives Help From: [employer]  ADL Assistance: [address] [place] [address] Avenue: Independent  Homemaking Responsibilities: Yes (Reports neighbor occassionally completes grocery shopping.  Pt reports falling while mowing lawn)  Ambulation Assistance: Independent  Transfer Assistance: Independent  Active : Yes (Pt reports pain/tingling in L forearm while driving)  Mode of Transportation: [facility]  Occupation: Retired  Type of occupation: [employer]  Leisure & Hobbies: Playing with dogs  Additional Comments: Pt reports neighbor is available to provided 24/7 assist PRN    Objective          AROM RLE (degrees)  RLE AROM: WFL  AROM LLE (degrees)  LLE AROM : WFL  Strength RLE  Strength RLE: WFL  Strength LLE  Strength LLE: WFL     Sensation  Overall Sensation Status: Impaired (Pt states numbness and tingling \"comes and goes\")  Bed mobility  Supine to Sit: Minimal assistance  Sit to Supine: Minimal assistance  Scooting: Minimal assistance  Transfers  Sit to Stand: Contact guard assistance  Stand to sit: Contact guard assistance  Ambulation  Ambulation?: Yes  Ambulation 1  Surface: level tile  Device: Rolling Walker  Assistance: Contact guard assistance  Quality of Gait: flexed posture, no LOB, unsteady, no buckling.  fatigues  Distance: [de-identified]'  Stairs/Curb  Stairs?: No     Balance  Posture: Fair  Sitting - Static: Good  Sitting - Dynamic: Good  Standing - Static: Good;-  Standing - Dynamic: Fair;+  Comments: RW used while assessing standing balance  Exercises  Comments: increased time for mobility     Plan   Plan  Times per week: 5x/wk  Current Treatment Recommendations: Strengthening, Balance Training, Endurance Training, Functional Mobility Training, Transfer Training, Gait Training, Home Exercise Program, Safety Education & Training, Patient/Caregiver Education & Training, Equipment Evaluation, Education, & procurement  Safety Devices  Type of devices: Call light within reach, Nurse notified, Gait belt, Patient at risk for falls, Left in bed, All fall risk precautions in place  Restraints  Initially in place: No    AM-PAC Score     AM-PAC Inpatient Mobility without Stair Climbing Raw Score : 17 (08/11/21 1530)  AM-PAC Inpatient without Stair Climbing T-Scale Score : 48.47 (08/11/21 1530)  Mobility Inpatient CMS 0-100% Score: 32.72 (08/11/21 1530)  Mobility Inpatient without Stair CMS G-Code Modifier : Adrienne White (08/11/21 1530)       Goals  Short term goals  Time Frame for Short term goals: 14 visits  Short term goal 1: Pt will be Richelle bed mobility  Short term goal 2: Pt will be Richelle transfers  Short term goal 3: Pt will be Richelle amb 200' [facility] Therapy Time   Individual Concurrent Group Co-treatment   Time In 1313         Time Out 1337         Minutes 24         Timed Code Treatment Minutes: 8 Minutes       Trevor Cohn PT

## 2021-08-11 NOTE — PROGRESS NOTES
[facility]  Internal Medicine Teaching Residency Program  Inpatient Daily Progress Note  ______________________________________________________________________________    Patient: Delfin Hernandez  YOB: 1940   GDY:[id number]    Acct: [de-identified]     Room: [address] [place] [address]  Admit date: 8/9/2021  Today's date: 08/11/21  Number of days in the hospital: 2    SUBJECTIVE   Admitting Diagnosis: Dizziness      Patient examined at bedside  Labs and chart reviewed  Has chronic headache, postconcussion  Dizziness improved  Sodium improving  Plan for discharge      ROS:  Constitutional:  negative for chills, fevers, sweats  Respiratory:  negative for cough, dyspnea on exertion, hemoptysis, shortness of breath, wheezing  Cardiovascular:  negative for chest pain, chest pressure/discomfort, lower extremity edema, palpitations  Gastrointestinal:  negative for abdominal pain, constipation, diarrhea, nausea, vomiting  Neurological: Positive for dizziness, headache  BRIEF HISTORY     The patient is a pleasant 79 y. o. male presents with a chief complaint of dizziness weakness and headache for the last 1 month which has been increased for the last couple of days.  Dizziness is episodic and has no particular association.  No tinnitus or hearing loss reported.  Headache is 5/10, throbbing, lower the head and is chronic since his fall his Subdural Hematoma Back in 2011.  Patient has a history of bilateral upper extremity tremors at rest.  Patient denies any chest pain, loss of consciousness, dyspnea, funny sensation in the chest, current head trauma, fall, abdominal pain, dysuria blood in stools or other constitutional symptoms.  Patient is otherwise well and has no other complaints.     Patient has history of coronary artery disease prediabetes, hypertension, cerebrovascular event, hyperlipidemia, left-ventricular hypertrophy, Parkinson's disease on Sinemet, CKD stage III, intracranial bleed: Traumatic left subdural hematoma, depression and diverticulosis.     On arrival patient is vitally stable, saturating on room air.  Metabolic panel and blood counts are normal except sodium of 127 and proBNP of 513.  Chest x-ray shows no acute pulmonary process. OBJECTIVE     Vital Signs:  /72   Pulse 64   Temp 98.6 °F (37 °C) (Oral)   Resp 14   Ht 5' 7\" (1.702 m)   Wt 173 lb 1 oz (78.5 kg)   SpO2 100%   BMI 27.11 kg/m²     Temp (24hrs), Av.3 °F (36.8 °C), Min:97.9 °F (36.6 °C), Max:98.6 °F (37 °C)    In: 1290   Out: 500 [Urine:500]    Physical Exam:  Constitutional: Alert, oriented, cooperative and in no apparent distress. Head:normocephalic and atraumatic. EENT:  PERRLA. No conjunctival injections. Septum was midline, mucosa was without erythema, exudates or cobblestoning. No thrush was noted. Neck: Supple without thyromegaly. No elevated JVP. Trachea was midline. Respiratory: Chest was symmetrical without dullness to percussion. Breath sounds bilaterally were clear to auscultation. There were no wheezes, rhonchi or rales. There is no intercostal retraction or use of accessory muscles. No egophony noted. Cardiovascular: Regular without murmur, clicks, gallops or rubs. Abdomen: Slightly rounded and soft without organomegaly. No rebound, rigidity or guarding was appreciated. Lymphatic: No lymphadenopathy. Musculoskeletal: Normal curvature of the spine. No gross muscle weakness. Extremities:  No lower extremity edema, ulcerations, tenderness, varicosities or erythema. Muscle size, tone and strength are normal.  No involuntary movements are noted. Skin:  Warm and dry. Good color, turgor and pigmentation. No lesions or scars.   No cyanosis or clubbing  Neurological/Psychiatric: The patient's general behavior, level of consciousness, thought content and emotional status is normal.        Medications:  Scheduled Medications:    metoprolol tartrate  12.5 mg Oral BID  atorvastatin  40 mg Oral Daily    carbidopa-levodopa  1 tablet Oral 4x Daily    clopidogrel  75 mg Oral Daily    pantoprazole  40 mg Oral QAM AC    magnesium oxide  400 mg Oral Daily    sodium chloride flush  5-40 mL Intravenous 2 times per day    enoxaparin  40 mg Subcutaneous Daily     Continuous Infusions:    sodium chloride 75 mL/hr at 08/10/21 0852    sodium chloride       PRN Medicationsbutalbital-acetaminophen-caffeine, 1 tablet, Q4H PRN  nitroGLYCERIN, 0.4 mg, Q5 Min PRN  sodium chloride flush, 5-40 mL, PRN  sodium chloride, 25 mL, PRN  ondansetron, 4 mg, Q8H PRN   Or  ondansetron, 4 mg, Q6H PRN  polyethylene glycol, 17 g, Daily PRN  acetaminophen, 650 mg, Q6H PRN   Or  acetaminophen, 650 mg, Q6H PRN        Diagnostic Labs:  CBC:   Recent Labs     08/09/21  1201 08/10/21  0517 08/11/21  0404   WBC 7.1 5.7 9.5   RBC 4.59 4.45 4.13*   HGB 14.1 13.5 12.8*   HCT 42.4 41.1 39.7*   MCV 92.4 92.4 96.1   RDW 13.7 13.9 14.2    194 159     BMP:   Recent Labs     08/09/21  1530 08/09/21  1530 08/09/21  2151 08/10/21  0517 08/11/21  0404   *   < > 132* 131* 131*   K 4.5  --   --  5.0 4.6   CL 97*  --   --  98 99   CO2 22  --   --  22 20   BUN 8  --   --  10 20   CREATININE 1.01  --   --  1.36* 1.39*    < > = values in this interval not displayed. BNP: No results for input(s): BNP in the last 72 hours. PT/INR: No results for input(s): PROTIME, INR in the last 72 hours. APTT: No results for input(s): APTT in the last 72 hours. CARDIAC ENZYMES: No results for input(s): CKMB, CKMBINDEX, TROPONINI in the last 72 hours.     Invalid input(s): CKTOTAL;3  FASTING LIPID PANEL:  Lab Results   Component Value Date    CHOL 101 07/20/2020    HDL 54 05/11/2021    TRIG 70 07/20/2020     LIVER PROFILE:   Recent Labs     08/09/21  1201   AST 37   ALT 44*   BILITOT 0.45   ALKPHOS 109      MICROBIOLOGY:   Lab Results   Component Value Date/Time    CULTURE (A) 09/23/2015 04:50 PM     METHICILLIN RESISTANT STAPHYLOCOCCUS AUREUS MODERATE GROWTH    CULTURE  09/23/2015 04:50 PM     [facility] [address] [facility], [address] [place] [address] [phone]       Imaging:    XR CHEST PORTABLE    Result Date: 8/9/2021  No acute cardiopulmonary process. ASSESSMENT & PLAN       Hyponatremia :  · Continue IV fluids  · Sodium improving  · Continue to monitor     Chronic posttraumatic headache, not intractable:  · Give Tylenol as needed        Parkinson's disease  · Continue Sinemet  mg  · Ambulate with care     Coronary artery disease  · Continue Lopressor 25 mg, Plavix 75 mg, Lipitor 40 mg and Nitrostat 0.4 mg sublingual if chest pain     Hypertension  · Currently controlled     Prediabetes:  · Currently normal reading     CKD stage 3:  · Monitor output  · Monitor serum creatinine and potassium  · Watch for pulmonary edema        DVT ppx: Lovenox 40 mg daily  GI ppx: Protonix 40 daily     PT/OT/SW: following  Discharge Planning: on board    Hira Clayton MD  Internal Medicine Resident, PGY-1  [facility];  [facility], [facility]  8/11/2021, 7:58 AM

## 2021-08-12 ENCOUNTER — CARE COORDINATION (OUTPATIENT)
Dept: CARE COORDINATION | Age: 81
End: 2021-08-12

## 2021-08-12 ENCOUNTER — APPOINTMENT (OUTPATIENT)
Dept: MRI IMAGING | Age: 81
DRG: 641 | End: 2021-08-12
Payer: MEDICARE

## 2021-08-12 VITALS
OXYGEN SATURATION: 96 % | BODY MASS INDEX: 26.78 KG/M2 | RESPIRATION RATE: 18 BRPM | WEIGHT: 170.64 LBS | SYSTOLIC BLOOD PRESSURE: 120 MMHG | DIASTOLIC BLOOD PRESSURE: 68 MMHG | HEART RATE: 57 BPM | TEMPERATURE: 97.4 F | HEIGHT: 67 IN

## 2021-08-12 LAB
ABSOLUTE EOS #: 0.24 K/UL (ref 0–0.4)
ABSOLUTE IMMATURE GRANULOCYTE: 0 K/UL (ref 0–0.3)
ABSOLUTE LYMPH #: 0.66 K/UL (ref 1–4.8)
ABSOLUTE MONO #: 0.3 K/UL (ref 0.1–0.8)
ANION GAP SERPL CALCULATED.3IONS-SCNC: 10 MMOL/L (ref 9–17)
BASOPHILS # BLD: 1 % (ref 0–2)
BASOPHILS ABSOLUTE: 0.06 K/UL (ref 0–0.2)
BUN BLDV-MCNC: 15 MG/DL (ref 8–23)
BUN/CREAT BLD: ABNORMAL (ref 9–20)
CALCIUM SERPL-MCNC: 8.8 MG/DL (ref 8.6–10.4)
CHLORIDE BLD-SCNC: 103 MMOL/L (ref 98–107)
CO2: 23 MMOL/L (ref 20–31)
CREAT SERPL-MCNC: 1.37 MG/DL (ref 0.7–1.2)
DIFFERENTIAL TYPE: ABNORMAL
EOSINOPHILS RELATIVE PERCENT: 4 % (ref 1–4)
GFR AFRICAN AMERICAN: >60 ML/MIN
GFR NON-AFRICAN AMERICAN: 50 ML/MIN
GFR SERPL CREATININE-BSD FRML MDRD: ABNORMAL ML/MIN/{1.73_M2}
GFR SERPL CREATININE-BSD FRML MDRD: ABNORMAL ML/MIN/{1.73_M2}
GLUCOSE BLD-MCNC: 96 MG/DL (ref 70–99)
HCT VFR BLD CALC: 41.1 % (ref 40.7–50.3)
HEMOGLOBIN: 13.3 G/DL (ref 13–17)
IMMATURE GRANULOCYTES: 0 %
LYMPHOCYTES # BLD: 11 % (ref 24–44)
MCH RBC QN AUTO: 30.8 PG (ref 25.2–33.5)
MCHC RBC AUTO-ENTMCNC: 32.4 G/DL (ref 28.4–34.8)
MCV RBC AUTO: 95.1 FL (ref 82.6–102.9)
MONOCYTES # BLD: 5 % (ref 1–7)
MORPHOLOGY: NORMAL
NRBC AUTOMATED: 0 PER 100 WBC
PDW BLD-RTO: 14.3 % (ref 11.8–14.4)
PLATELET # BLD: ABNORMAL K/UL (ref 138–453)
PLATELET ESTIMATE: ABNORMAL
PLATELET, FLUORESCENCE: 160 K/UL (ref 138–453)
PLATELET, IMMATURE FRACTION: 1.8 % (ref 1.1–10.3)
PMV BLD AUTO: ABNORMAL FL (ref 8.1–13.5)
POTASSIUM SERPL-SCNC: 4.6 MMOL/L (ref 3.7–5.3)
RBC # BLD: 4.32 M/UL (ref 4.21–5.77)
RBC # BLD: ABNORMAL 10*6/UL
SEG NEUTROPHILS: 79 % (ref 36–66)
SEGMENTED NEUTROPHILS ABSOLUTE COUNT: 4.74 K/UL (ref 1.8–7.7)
SODIUM BLD-SCNC: 136 MMOL/L (ref 135–144)
WBC # BLD: 6 K/UL (ref 3.5–11.3)
WBC # BLD: ABNORMAL 10*3/UL

## 2021-08-12 PROCEDURE — 36415 COLL VENOUS BLD VENIPUNCTURE: CPT

## 2021-08-12 PROCEDURE — 6370000000 HC RX 637 (ALT 250 FOR IP): Performed by: STUDENT IN AN ORGANIZED HEALTH CARE EDUCATION/TRAINING PROGRAM

## 2021-08-12 PROCEDURE — 6360000002 HC RX W HCPCS: Performed by: STUDENT IN AN ORGANIZED HEALTH CARE EDUCATION/TRAINING PROGRAM

## 2021-08-12 PROCEDURE — 70551 MRI BRAIN STEM W/O DYE: CPT

## 2021-08-12 PROCEDURE — 99239 HOSP IP/OBS DSCHRG MGMT >30: CPT | Performed by: INTERNAL MEDICINE

## 2021-08-12 PROCEDURE — 2580000003 HC RX 258: Performed by: STUDENT IN AN ORGANIZED HEALTH CARE EDUCATION/TRAINING PROGRAM

## 2021-08-12 PROCEDURE — 85025 COMPLETE CBC W/AUTO DIFF WBC: CPT

## 2021-08-12 PROCEDURE — 85055 RETICULATED PLATELET ASSAY: CPT

## 2021-08-12 PROCEDURE — 80048 BASIC METABOLIC PNL TOTAL CA: CPT

## 2021-08-12 RX ADMIN — CLOPIDOGREL 75 MG: 75 TABLET, FILM COATED ORAL at 09:11

## 2021-08-12 RX ADMIN — BUTALBITAL, ACETAMINOPHEN AND CAFFEINE 1 TABLET: 50; 325; 40 TABLET ORAL at 05:09

## 2021-08-12 RX ADMIN — MAGNESIUM GLUCONATE 500 MG ORAL TABLET 400 MG: 500 TABLET ORAL at 09:12

## 2021-08-12 RX ADMIN — BUTALBITAL, ACETAMINOPHEN AND CAFFEINE 1 TABLET: 50; 325; 40 TABLET ORAL at 14:04

## 2021-08-12 RX ADMIN — ATORVASTATIN CALCIUM 40 MG: 80 TABLET, FILM COATED ORAL at 09:11

## 2021-08-12 RX ADMIN — METOPROLOL TARTRATE 12.5 MG: 25 TABLET ORAL at 09:11

## 2021-08-12 RX ADMIN — PANTOPRAZOLE SODIUM 40 MG: 40 TABLET, DELAYED RELEASE ORAL at 05:06

## 2021-08-12 RX ADMIN — BUTALBITAL, ACETAMINOPHEN AND CAFFEINE 1 TABLET: 50; 325; 40 TABLET ORAL at 09:15

## 2021-08-12 RX ADMIN — CARBIDOPA AND LEVODOPA 1 TABLET: 25; 100 TABLET ORAL at 14:03

## 2021-08-12 RX ADMIN — ENOXAPARIN SODIUM 40 MG: 40 INJECTION SUBCUTANEOUS at 09:10

## 2021-08-12 RX ADMIN — ACETAMINOPHEN 650 MG: 325 TABLET ORAL at 11:22

## 2021-08-12 RX ADMIN — CARBIDOPA AND LEVODOPA 1 TABLET: 25; 100 TABLET ORAL at 09:11

## 2021-08-12 RX ADMIN — SODIUM CHLORIDE, PRESERVATIVE FREE 10 ML: 5 INJECTION INTRAVENOUS at 09:11

## 2021-08-12 ASSESSMENT — PAIN SCALES - GENERAL
PAINLEVEL_OUTOF10: 8
PAINLEVEL_OUTOF10: 9
PAINLEVEL_OUTOF10: 8
PAINLEVEL_OUTOF10: 9

## 2021-08-12 NOTE — PROGRESS NOTES
bleed: Traumatic left subdural hematoma, depression and diverticulosis.     On arrival patient is vitally stable, saturating on room air.  Metabolic panel and blood counts are normal except sodium of 127 and proBNP of 513.  Chest x-ray shows no acute pulmonary process. OBJECTIVE     Vital Signs:  /68   Pulse 57   Temp 97.4 °F (36.3 °C)   Resp 18   Ht 5' 7\" (1.702 m)   Wt 170 lb 10.2 oz (77.4 kg)   SpO2 96%   BMI 26.73 kg/m²     Temp (24hrs), Av.7 °F (36.5 °C), Min:97.4 °F (36.3 °C), Max:98.1 °F (36.7 °C)    In: -   Out: 2325 [Urine:2325]    Physical Exam:  Constitutional: Alert, oriented, cooperative and in no apparent distress. Head:normocephalic and atraumatic. EENT:  PERRLA. No conjunctival injections. Septum was midline, mucosa was without erythema, exudates or cobblestoning. No thrush was noted. Neck: Supple without thyromegaly. No elevated JVP. Trachea was midline. Respiratory: Chest was symmetrical without dullness to percussion. Breath sounds bilaterally were clear to auscultation. There were no wheezes, rhonchi or rales. There is no intercostal retraction or use of accessory muscles. No egophony noted. Cardiovascular: Regular without murmur, clicks, gallops or rubs. Abdomen: Slightly rounded and soft without organomegaly. No rebound, rigidity or guarding was appreciated. Lymphatic: No lymphadenopathy. Musculoskeletal: Normal curvature of the spine. No gross muscle weakness. Extremities:  No lower extremity edema, ulcerations, tenderness, varicosities or erythema. Muscle size, tone and strength are normal.  No involuntary movements are noted. Skin:  Warm and dry. Good color, turgor and pigmentation. No lesions or scars.   No cyanosis or clubbing  Neurological/Psychiatric: The patient's general behavior, level of consciousness, thought content and emotional status is normal.        Medications:  Scheduled Medications:    metoprolol tartrate  12.5 mg Oral BID  atorvastatin  40 mg Oral Daily    carbidopa-levodopa  1 tablet Oral 4x Daily    clopidogrel  75 mg Oral Daily    pantoprazole  40 mg Oral QAM AC    magnesium oxide  400 mg Oral Daily    sodium chloride flush  5-40 mL Intravenous 2 times per day    enoxaparin  40 mg Subcutaneous Daily     Continuous Infusions:    sodium chloride       PRN Medicationsbutalbital-acetaminophen-caffeine, 1 tablet, Q4H PRN  nitroGLYCERIN, 0.4 mg, Q5 Min PRN  sodium chloride flush, 5-40 mL, PRN  sodium chloride, 25 mL, PRN  ondansetron, 4 mg, Q8H PRN   Or  ondansetron, 4 mg, Q6H PRN  polyethylene glycol, 17 g, Daily PRN  acetaminophen, 650 mg, Q6H PRN   Or  acetaminophen, 650 mg, Q6H PRN        Diagnostic Labs:  CBC:   Recent Labs     08/10/21  0517 08/11/21  0404 08/12/21  0358   WBC 5.7 9.5 6.0   RBC 4.45 4.13* 4.32   HGB 13.5 12.8* 13.3   HCT 41.1 39.7* 41.1   MCV 92.4 96.1 95.1   RDW 13.9 14.2 14.3    159 See Reflexed IPF Result     BMP:   Recent Labs     08/10/21  0517 08/10/21  0517 08/11/21  0404 08/11/21  1240 08/12/21  0358   *   < > 131* 130* 136   K 5.0  --  4.6  --  4.6   CL 98  --  99  --  103   CO2 22  --  20  --  23   BUN 10  --  20  --  15   CREATININE 1.36*  --  1.39*  --  1.37*    < > = values in this interval not displayed. BNP: No results for input(s): BNP in the last 72 hours. PT/INR: No results for input(s): PROTIME, INR in the last 72 hours. APTT: No results for input(s): APTT in the last 72 hours. CARDIAC ENZYMES: No results for input(s): CKMB, CKMBINDEX, TROPONINI in the last 72 hours. Invalid input(s): CKTOTAL;3  FASTING LIPID PANEL:  Lab Results   Component Value Date    CHOL 101 07/20/2020    HDL 54 05/11/2021    TRIG 70 07/20/2020     LIVER PROFILE:   No results for input(s): AST, ALT, ALB, BILIDIR, BILITOT, ALKPHOS in the last 72 hours.    MICROBIOLOGY:   Lab Results   Component Value Date/Time    CULTURE (A) 09/23/2015 04:50 PM     METHICILLIN RESISTANT STAPHYLOCOCCUS AUREUS MODERATE GROWTH    CULTURE  09/23/2015 04:50 PM     [facility] [address] [facility], [address] [place] [address] [phone]       Imaging:    XR CHEST PORTABLE    Result Date: 8/9/2021  No acute cardiopulmonary process. ASSESSMENT & PLAN       Hyponatremia : Resolved  · Improved, continue to monitor.     Chronic posttraumatic headache  · Fiorecet and Mg oxide, follow up with Neurology outpatient  · Follow up with MRI.     Parkinson's disease  · Continue Sinemet  mg  · Ambulate with care     Coronary artery disease  · Continue Lopressor 25 mg, Plavix 75 mg,Aspirin,  Lipitor 40 mg and Nitrostat 0.4 mg sublingual if chest pain     Hypertension  · Currently controlled on BB.        DVT ppx: Lovenox 40 mg daily  GI ppx: Protonix 40 daily     PT/OT/SW: following  Discharge Planning: After MRI today. Rubén Lee MD  Internal Medicine Resident, PGY-2  [facility];  [facility]  8/12/2021, 12:19 PM

## 2021-08-12 NOTE — PLAN OF CARE
Problem: Pain:  Goal: Pain level will decrease  Description: Pain level will decrease  8/12/2021 1518 by Eva Shane RN  Outcome: Ongoing  8/12/2021 0519 by Sary Wall RN  Outcome: Ongoing  Goal: Control of acute pain  Description: Control of acute pain  8/12/2021 1518 by Eva Shane RN  Outcome: Ongoing  8/12/2021 0519 by Sary Wall RN  Outcome: Ongoing  Goal: Control of chronic pain  Description: Control of chronic pain  8/12/2021 1518 by Eva Shane RN  Outcome: Ongoing  8/12/2021 0519 by Sary Wall RN  Outcome: Ongoing     Problem: Falls - Risk of:  Goal: Will remain free from falls  Description: Will remain free from falls  8/12/2021 1518 by Eva Shnae RN  Outcome: Ongoing  8/12/2021 0519 by Sary Wall RN  Outcome: Ongoing  Goal: Absence of physical injury  Description: Absence of physical injury  8/12/2021 1518 by Eva Shane RN  Outcome: Ongoing  8/12/2021 0519 by Sary Wall RN  Outcome: Ongoing     Problem: Fluid Volume:  Goal: Ability to achieve a balanced intake and output will improve  Description: Ability to achieve a balanced intake and output will improve  8/12/2021 1518 by Eva Shane RN  Outcome: Ongoing  8/12/2021 0519 by Sary Wall RN  Outcome: Ongoing     Problem: Mobility - Impaired:  Goal: Mobility will improve  Description: Mobility will improve  8/12/2021 1518 by Eva Shane RN  Outcome: Ongoing  8/12/2021 0519 by Sary Wall RN  Outcome: Ongoing     Problem: Skin Integrity:  Goal: Will show no infection signs and symptoms  Description: Will show no infection signs and symptoms  Outcome: Ongoing  Goal: Absence of new skin breakdown  Description: Absence of new skin breakdown  Outcome: Ongoing

## 2021-08-12 NOTE — PROGRESS NOTES
Physical Therapy        Physical Therapy Cancel Note      DATE: 2021    NAME: Delfin Hernandez  MRN: [id number]   : 1940      Patient not seen this date for Physical Therapy due to:    Patient Declined: \" I don't feel right. Can you get an ice pack for my Headache.  \"      Electronically signed by Mason Cummins PT on 2021 at 10:30 AM

## 2021-08-12 NOTE — CARE COORDINATION
Patient called [facility] to report that he is in the hospital, he is very weak and he had an MRI today. He stated he does not know how his MRI will turn out. [facility] explained that the Doctors at the hospital will come in and inform him and that [facility] will call him when he is discharged. He said \"ok have a good day\" and then hung up. [facility] will follow up when discharged.

## 2021-08-12 NOTE — PLAN OF CARE
Problem: Pain:  Goal: Pain level will decrease  Description: Pain level will decrease  8/12/2021 0519 by Dago Merritt RN  Outcome: Ongoing  8/11/2021 1849 by Sean Calvo RN  Outcome: Ongoing  Goal: Control of acute pain  Description: Control of acute pain  8/12/2021 0519 by Dago Merritt RN  Outcome: Ongoing  8/11/2021 1849 by Sean Calvo RN  Outcome: Ongoing  Goal: Control of chronic pain  Description: Control of chronic pain  8/12/2021 0519 by Dago Merritt RN  Outcome: Ongoing  8/11/2021 1849 by Sean Calvo RN  Outcome: Ongoing     Problem: Falls - Risk of:  Goal: Will remain free from falls  Description: Will remain free from falls  8/12/2021 0519 by Dago Merritt RN  Outcome: Ongoing  8/11/2021 1849 by Sean Calvo RN  Outcome: Ongoing  Goal: Absence of physical injury  Description: Absence of physical injury  8/12/2021 0519 by Dago Merritt RN  Outcome: Ongoing  8/11/2021 1849 by Sean Calvo RN  Outcome: Ongoing     Problem: Fluid Volume:  Goal: Ability to achieve a balanced intake and output will improve  Description: Ability to achieve a balanced intake and output will improve  8/12/2021 0519 by Dago Merritt RN  Outcome: Ongoing  8/11/2021 1849 by Sean Calvo RN  Outcome: Ongoing     Problem: Physical Regulation:  Goal: Ability to maintain clinical measurements within normal limits will improve  Description: Ability to maintain clinical measurements within normal limits will improve  8/12/2021 0519 by Dago Merritt RN  Outcome: Ongoing  8/11/2021 1849 by Sean Calvo RN  Outcome: Ongoing  Goal: Will show no signs and symptoms of electrolyte imbalance  Description: Will show no signs and symptoms of electrolyte imbalance  8/12/2021 0519 by Dago Merritt RN  Outcome: Ongoing  8/11/2021 1849 by Sean Calvo RN  Outcome: Ongoing     Problem: Mobility - Impaired:  Goal: Mobility will improve  Description: Mobility will improve  8/12/2021 6231 by Graeme Yoon RN  Outcome: Ongoing  8/11/2021 1849 by Nicol Squires RN  Outcome: Ongoing

## 2021-08-12 NOTE — PLAN OF CARE
Problem: Pain:  Goal: Pain level will decrease  Description: Pain level will decrease  8/12/2021 1550 by Pratibha Good  Outcome: Completed  8/12/2021 1518 by Odilon Tena RN  Outcome: Ongoing  8/12/2021 0519 by Saw Lora RN  Outcome: Ongoing  Goal: Control of acute pain  Description: Control of acute pain  8/12/2021 1550 by Pratibha Good  Outcome: Completed  8/12/2021 1518 by Odilon Tena RN  Outcome: Ongoing  8/12/2021 0519 by Saw Lora RN  Outcome: Ongoing  Goal: Control of chronic pain  Description: Control of chronic pain  8/12/2021 1550 by Pratibha Good  Outcome: Completed  8/12/2021 1518 by Odilon Tena RN  Outcome: Ongoing  8/12/2021 0519 by Saw Lora RN  Outcome: Ongoing     Problem: Falls - Risk of:  Goal: Will remain free from falls  Description: Will remain free from falls  8/12/2021 1550 by Pratibha Good  Outcome: Completed  8/12/2021 1518 by Odilon Tena RN  Outcome: Ongoing  8/12/2021 0519 by Saw Lora RN  Outcome: Ongoing  Goal: Absence of physical injury  Description: Absence of physical injury  8/12/2021 1550 by Pratibha Good  Outcome: Completed  8/12/2021 1518 by Odilon Tena RN  Outcome: Ongoing  8/12/2021 0519 by Saw Lora RN  Outcome: Ongoing     Problem: Fluid Volume:  Goal: Ability to achieve a balanced intake and output will improve  Description: Ability to achieve a balanced intake and output will improve  8/12/2021 1550 by Pratibha Good  Outcome: Completed  8/12/2021 1518 by Odilon Tena RN  Outcome: Ongoing  8/12/2021 0519 by Saw Lora RN  Outcome: Ongoing     Problem: Physical Regulation:  Goal: Ability to maintain clinical measurements within normal limits will improve  Description: Ability to maintain clinical measurements within normal limits will improve  8/12/2021 1550 by Pratibha Good  Outcome: Completed  8/12/2021 1518 by Odilon Tena RN  Outcome: Ongoing  8/12/2021 0519 by Saw Lora, RN  Outcome: Ongoing  Goal: Will show no signs and symptoms of electrolyte imbalance  Description: Will show no signs and symptoms of electrolyte imbalance  8/12/2021 1550 by Lubna Manriquez  Outcome: Completed  8/12/2021 1518 by Rolando De Dios RN  Outcome: Ongoing  8/12/2021 0519 by Ashley De Paz RN  Outcome: Ongoing     Problem: Mobility - Impaired:  Goal: Mobility will improve  Description: Mobility will improve  8/12/2021 1550 by Lubna Manriquez  Outcome: Completed  8/12/2021 1518 by Rolando De Dios RN  Outcome: Ongoing  8/12/2021 0519 by Ashley De Paz RN  Outcome: Ongoing     Problem: Skin Integrity:  Goal: Will show no infection signs and symptoms  Description: Will show no infection signs and symptoms  8/12/2021 1550 by Lubna Manriquez  Outcome: Completed  8/12/2021 1518 by Rolando De Dios RN  Outcome: Ongoing  Goal: Absence of new skin breakdown  Description: Absence of new skin breakdown  8/12/2021 1550 by Lubna Manriquez  Outcome: Completed  8/12/2021 1518 by Rolando De Dios RN  Outcome: Ongoing

## 2021-08-12 NOTE — PROGRESS NOTES
Occup[facility] [address]  Occupational Therapy Not Seen Note    DATE: 2021    NAME: Clarke Herbert  MRN: [id number]   : 1940      Patient not seen this date for Occupational Therapy due to:    Pt declining therapy this date, reports feeling too weak and fatigued to participate. Writer ed pt on benefits of therapy and OOB activity.  Pt verbalized understanding but continued to decline     Next Scheduled Treatment: 21    Electronically signed by TRANG Dallas on 2021 at 2:46 PM

## 2021-08-13 ENCOUNTER — CARE COORDINATION (OUTPATIENT)
Dept: CASE MANAGEMENT | Age: 81
End: 2021-08-13

## 2021-08-13 ENCOUNTER — APPOINTMENT (OUTPATIENT)
Dept: CT IMAGING | Age: 81
End: 2021-08-13
Payer: MEDICARE

## 2021-08-13 ENCOUNTER — APPOINTMENT (OUTPATIENT)
Dept: GENERAL RADIOLOGY | Age: 81
End: 2021-08-13
Payer: MEDICARE

## 2021-08-13 ENCOUNTER — HOSPITAL ENCOUNTER (OUTPATIENT)
Age: 81
Setting detail: OBSERVATION
Discharge: NURSING, REHAB OR LONG-TERM CARE | End: 2021-08-17
Attending: EMERGENCY MEDICINE | Admitting: FAMILY MEDICINE
Payer: MEDICARE

## 2021-08-13 DIAGNOSIS — R29.6 FREQUENT FALLS: ICD-10-CM

## 2021-08-13 DIAGNOSIS — R53.83 FATIGUE, UNSPECIFIED TYPE: Primary | ICD-10-CM

## 2021-08-13 PROBLEM — R53.1 GENERAL WEAKNESS: Status: ACTIVE | Noted: 2021-08-13

## 2021-08-13 LAB
ABSOLUTE EOS #: 0.2 K/UL (ref 0–0.4)
ABSOLUTE IMMATURE GRANULOCYTE: ABNORMAL K/UL (ref 0–0.3)
ABSOLUTE LYMPH #: 1.1 K/UL (ref 1–4.8)
ABSOLUTE MONO #: 0.6 K/UL (ref 0.1–1.3)
ALBUMIN SERPL-MCNC: 4.1 G/DL (ref 3.5–5.2)
ALBUMIN/GLOBULIN RATIO: ABNORMAL (ref 1–2.5)
ALP BLD-CCNC: 198 U/L (ref 40–129)
ALT SERPL-CCNC: 108 U/L (ref 5–41)
ANION GAP SERPL CALCULATED.3IONS-SCNC: 10 MMOL/L (ref 9–17)
AST SERPL-CCNC: 40 U/L
BASOPHILS # BLD: 1 % (ref 0–2)
BASOPHILS ABSOLUTE: 0.1 K/UL (ref 0–0.2)
BILIRUB SERPL-MCNC: 0.43 MG/DL (ref 0.3–1.2)
BILIRUBIN URINE: NEGATIVE
BNP INTERPRETATION: ABNORMAL
BUN BLDV-MCNC: 13 MG/DL (ref 8–23)
BUN/CREAT BLD: ABNORMAL (ref 9–20)
CALCIUM SERPL-MCNC: 9.3 MG/DL (ref 8.6–10.4)
CHLORIDE BLD-SCNC: 100 MMOL/L (ref 98–107)
CO2: 23 MMOL/L (ref 20–31)
COLOR: YELLOW
COMMENT UA: NORMAL
CREAT SERPL-MCNC: 1.42 MG/DL (ref 0.7–1.2)
DIFFERENTIAL TYPE: ABNORMAL
EOSINOPHILS RELATIVE PERCENT: 4 % (ref 0–4)
GFR AFRICAN AMERICAN: 58 ML/MIN
GFR NON-AFRICAN AMERICAN: 48 ML/MIN
GFR SERPL CREATININE-BSD FRML MDRD: ABNORMAL ML/MIN/{1.73_M2}
GFR SERPL CREATININE-BSD FRML MDRD: ABNORMAL ML/MIN/{1.73_M2}
GLUCOSE BLD-MCNC: 109 MG/DL (ref 70–99)
GLUCOSE URINE: NEGATIVE
HCT VFR BLD CALC: 40.2 % (ref 41–53)
HEMOGLOBIN: 13.7 G/DL (ref 13.5–17.5)
IMMATURE GRANULOCYTES: ABNORMAL %
KETONES, URINE: NEGATIVE
LACTIC ACID: 1.1 MMOL/L (ref 0.5–2.2)
LEUKOCYTE ESTERASE, URINE: NEGATIVE
LIPASE: 33 U/L (ref 13–60)
LYMPHOCYTES # BLD: 21 % (ref 24–44)
MCH RBC QN AUTO: 31.9 PG (ref 26–34)
MCHC RBC AUTO-ENTMCNC: 34.1 G/DL (ref 31–37)
MCV RBC AUTO: 93.7 FL (ref 80–100)
MONOCYTES # BLD: 11 % (ref 1–7)
NITRITE, URINE: NEGATIVE
NRBC AUTOMATED: ABNORMAL PER 100 WBC
PDW BLD-RTO: 15.2 % (ref 11.5–14.9)
PH UA: 6.5 (ref 5–8)
PLATELET # BLD: 185 K/UL (ref 150–450)
PLATELET ESTIMATE: ABNORMAL
PMV BLD AUTO: 7 FL (ref 6–12)
POTASSIUM SERPL-SCNC: 4.3 MMOL/L (ref 3.7–5.3)
PRO-BNP: 624 PG/ML
PROTEIN UA: NEGATIVE
RBC # BLD: 4.29 M/UL (ref 4.5–5.9)
RBC # BLD: ABNORMAL 10*6/UL
SEG NEUTROPHILS: 63 % (ref 36–66)
SEGMENTED NEUTROPHILS ABSOLUTE COUNT: 3.3 K/UL (ref 1.3–9.1)
SODIUM BLD-SCNC: 133 MMOL/L (ref 135–144)
SPECIFIC GRAVITY UA: 1.01 (ref 1–1.03)
TOTAL PROTEIN: 7.4 G/DL (ref 6.4–8.3)
TROPONIN INTERP: NORMAL
TROPONIN T: NORMAL NG/ML
TROPONIN, HIGH SENSITIVITY: 22 NG/L (ref 0–22)
TURBIDITY: CLEAR
URINE HGB: NEGATIVE
UROBILINOGEN, URINE: NORMAL
WBC # BLD: 5.3 K/UL (ref 3.5–11)
WBC # BLD: ABNORMAL 10*3/UL

## 2021-08-13 PROCEDURE — 80053 COMPREHEN METABOLIC PANEL: CPT

## 2021-08-13 PROCEDURE — 83690 ASSAY OF LIPASE: CPT

## 2021-08-13 PROCEDURE — 70450 CT HEAD/BRAIN W/O DYE: CPT

## 2021-08-13 PROCEDURE — 85025 COMPLETE CBC W/AUTO DIFF WBC: CPT

## 2021-08-13 PROCEDURE — 71045 X-RAY EXAM CHEST 1 VIEW: CPT

## 2021-08-13 PROCEDURE — 81003 URINALYSIS AUTO W/O SCOPE: CPT

## 2021-08-13 PROCEDURE — 72125 CT NECK SPINE W/O DYE: CPT

## 2021-08-13 PROCEDURE — 99282 EMERGENCY DEPT VISIT SF MDM: CPT

## 2021-08-13 PROCEDURE — 36415 COLL VENOUS BLD VENIPUNCTURE: CPT

## 2021-08-13 PROCEDURE — 93005 ELECTROCARDIOGRAM TRACING: CPT | Performed by: EMERGENCY MEDICINE

## 2021-08-13 PROCEDURE — 83605 ASSAY OF LACTIC ACID: CPT

## 2021-08-13 PROCEDURE — G0378 HOSPITAL OBSERVATION PER HR: HCPCS

## 2021-08-13 PROCEDURE — 83880 ASSAY OF NATRIURETIC PEPTIDE: CPT

## 2021-08-13 PROCEDURE — 84484 ASSAY OF TROPONIN QUANT: CPT

## 2021-08-13 ASSESSMENT — ENCOUNTER SYMPTOMS
COLOR CHANGE: 0
ABDOMINAL PAIN: 0
VOMITING: 0
DIARRHEA: 0
CONSTIPATION: 0
SHORTNESS OF BREATH: 0
NAUSEA: 0
CHEST TIGHTNESS: 0

## 2021-08-13 NOTE — ED NOTES
Mode of arrival (squad #, walk in, police, etc) : walk-in        Chief complaint(s): weakness, falls        Arrival Note (brief scenario, treatment PTA, etc). : Patient arrived to ED this evening with c/o weakness and falls. Patient reports being discharged from hospital yesterday for same thing. Patient reports falling multiple times at home due to weakness. Patient denies hitting head but states he has history of Parkinson's. And just feels weak on feet. Patient alert and oriented x4 and respirations even non labored.               Sharee Gibson RN  08/1940

## 2021-08-13 NOTE — CARE COORDINATION
[facility] Transitions Initial Follow Up Call    Call within 2 business days of discharge: Yes    Patient: Qing Choe Patient : 1940   MRN: [id number]  Reason for Admission: Dizziness  Discharge Date: 21 RARS: Readmission Risk Score: 14      Last Discharge [address] [address]       Complaint Diagnosis Description Type Department Provider    21 Dizziness Dizziness . .. ED to Hosp-Admission (Discharged) (ADMITTED) [facility] Keyon Gardner MD; Jam Blanchard... First attempt at initial 24 hour CTN call     Spoke with: Called to speak with patient for  Initial  transition of care. Left HIPPA compliant voice message with contact information [phone] for a call  Back with an update.     Facility: [facility]    Non-face-to-face services provided:  Obtained and reviewed discharge summary and/or continuity of care documents    Care Transitions 24 Hour Call    Do you have all of your prescriptions and are they filled?: No  Patient DME: Straight cane  Do you have support at home?: Alone  Are you an active caregiver in your home?: No  Care Transitions Interventions         Follow Up  Future Appointments   Date Time Provider Shireen Charles   2021  2:00 PM [id number]   2021  1:30 PM Curry Mills MD [facility] Ruslan De Los Santos LPN

## 2021-08-14 LAB
ALBUMIN SERPL-MCNC: 3.7 G/DL (ref 3.5–5.2)
ALBUMIN/GLOBULIN RATIO: ABNORMAL (ref 1–2.5)
ALP BLD-CCNC: 172 U/L (ref 40–129)
ALT SERPL-CCNC: 118 U/L (ref 5–41)
ANION GAP SERPL CALCULATED.3IONS-SCNC: 10 MMOL/L (ref 9–17)
AST SERPL-CCNC: 36 U/L
BILIRUB SERPL-MCNC: 0.4 MG/DL (ref 0.3–1.2)
BUN BLDV-MCNC: 13 MG/DL (ref 8–23)
BUN/CREAT BLD: ABNORMAL (ref 9–20)
CALCIUM SERPL-MCNC: 9.2 MG/DL (ref 8.6–10.4)
CHLORIDE BLD-SCNC: 104 MMOL/L (ref 98–107)
CO2: 25 MMOL/L (ref 20–31)
CREAT SERPL-MCNC: 1.34 MG/DL (ref 0.7–1.2)
GFR AFRICAN AMERICAN: >60 ML/MIN
GFR NON-AFRICAN AMERICAN: 51 ML/MIN
GFR SERPL CREATININE-BSD FRML MDRD: ABNORMAL ML/MIN/{1.73_M2}
GFR SERPL CREATININE-BSD FRML MDRD: ABNORMAL ML/MIN/{1.73_M2}
GLUCOSE BLD-MCNC: 106 MG/DL (ref 70–99)
POTASSIUM SERPL-SCNC: 4 MMOL/L (ref 3.7–5.3)
SODIUM BLD-SCNC: 139 MMOL/L (ref 135–144)
TOTAL PROTEIN: 6.9 G/DL (ref 6.4–8.3)

## 2021-08-14 PROCEDURE — 2580000003 HC RX 258: Performed by: FAMILY MEDICINE

## 2021-08-14 PROCEDURE — 6370000000 HC RX 637 (ALT 250 FOR IP): Performed by: FAMILY MEDICINE

## 2021-08-14 PROCEDURE — 96372 THER/PROPH/DIAG INJ SC/IM: CPT

## 2021-08-14 PROCEDURE — 1200000000 HC SEMI PRIVATE

## 2021-08-14 PROCEDURE — 6370000000 HC RX 637 (ALT 250 FOR IP): Performed by: EMERGENCY MEDICINE

## 2021-08-14 PROCEDURE — 6360000002 HC RX W HCPCS: Performed by: FAMILY MEDICINE

## 2021-08-14 PROCEDURE — G0378 HOSPITAL OBSERVATION PER HR: HCPCS

## 2021-08-14 PROCEDURE — 36415 COLL VENOUS BLD VENIPUNCTURE: CPT

## 2021-08-14 PROCEDURE — 80053 COMPREHEN METABOLIC PANEL: CPT

## 2021-08-14 RX ORDER — POLYETHYLENE GLYCOL 3350 17 G/17G
17 POWDER, FOR SOLUTION ORAL DAILY PRN
Status: DISCONTINUED | OUTPATIENT
Start: 2021-08-14 | End: 2021-08-17 | Stop reason: HOSPADM

## 2021-08-14 RX ORDER — NITROGLYCERIN 0.4 MG/1
0.4 TABLET SUBLINGUAL EVERY 5 MIN PRN
Status: DISCONTINUED | OUTPATIENT
Start: 2021-08-14 | End: 2021-08-17 | Stop reason: HOSPADM

## 2021-08-14 RX ORDER — ONDANSETRON 2 MG/ML
4 INJECTION INTRAMUSCULAR; INTRAVENOUS EVERY 6 HOURS PRN
Status: DISCONTINUED | OUTPATIENT
Start: 2021-08-14 | End: 2021-08-17 | Stop reason: HOSPADM

## 2021-08-14 RX ORDER — ACETAMINOPHEN 500 MG
1000 TABLET ORAL ONCE
Status: COMPLETED | OUTPATIENT
Start: 2021-08-14 | End: 2021-08-14

## 2021-08-14 RX ORDER — SODIUM CHLORIDE 0.9 % (FLUSH) 0.9 %
5-40 SYRINGE (ML) INJECTION PRN
Status: DISCONTINUED | OUTPATIENT
Start: 2021-08-14 | End: 2021-08-17 | Stop reason: HOSPADM

## 2021-08-14 RX ORDER — ACETAMINOPHEN 650 MG/1
650 SUPPOSITORY RECTAL EVERY 6 HOURS PRN
Status: DISCONTINUED | OUTPATIENT
Start: 2021-08-14 | End: 2021-08-17 | Stop reason: HOSPADM

## 2021-08-14 RX ORDER — SODIUM CHLORIDE 9 MG/ML
25 INJECTION, SOLUTION INTRAVENOUS PRN
Status: DISCONTINUED | OUTPATIENT
Start: 2021-08-14 | End: 2021-08-17 | Stop reason: HOSPADM

## 2021-08-14 RX ORDER — PANTOPRAZOLE SODIUM 40 MG/1
40 TABLET, DELAYED RELEASE ORAL
Status: DISCONTINUED | OUTPATIENT
Start: 2021-08-14 | End: 2021-08-17 | Stop reason: HOSPADM

## 2021-08-14 RX ORDER — SODIUM CHLORIDE 0.9 % (FLUSH) 0.9 %
5-40 SYRINGE (ML) INJECTION EVERY 12 HOURS SCHEDULED
Status: DISCONTINUED | OUTPATIENT
Start: 2021-08-14 | End: 2021-08-17 | Stop reason: HOSPADM

## 2021-08-14 RX ORDER — SODIUM CHLORIDE 9 MG/ML
INJECTION, SOLUTION INTRAVENOUS CONTINUOUS
Status: DISCONTINUED | OUTPATIENT
Start: 2021-08-14 | End: 2021-08-17 | Stop reason: HOSPADM

## 2021-08-14 RX ORDER — ACETAMINOPHEN 325 MG/1
650 TABLET ORAL EVERY 6 HOURS PRN
Status: DISCONTINUED | OUTPATIENT
Start: 2021-08-14 | End: 2021-08-17 | Stop reason: HOSPADM

## 2021-08-14 RX ORDER — ONDANSETRON 4 MG/1
4 TABLET, ORALLY DISINTEGRATING ORAL EVERY 8 HOURS PRN
Status: DISCONTINUED | OUTPATIENT
Start: 2021-08-14 | End: 2021-08-17 | Stop reason: HOSPADM

## 2021-08-14 RX ORDER — ALLOPURINOL 100 MG/1
100 TABLET ORAL DAILY
Status: DISCONTINUED | OUTPATIENT
Start: 2021-08-14 | End: 2021-08-17 | Stop reason: HOSPADM

## 2021-08-14 RX ORDER — CLOPIDOGREL BISULFATE 75 MG/1
75 TABLET ORAL DAILY
Status: DISCONTINUED | OUTPATIENT
Start: 2021-08-14 | End: 2021-08-17 | Stop reason: HOSPADM

## 2021-08-14 RX ADMIN — SODIUM CHLORIDE: 9 INJECTION, SOLUTION INTRAVENOUS at 05:31

## 2021-08-14 RX ADMIN — PANTOPRAZOLE SODIUM 40 MG: 40 TABLET, DELAYED RELEASE ORAL at 07:20

## 2021-08-14 RX ADMIN — CLOPIDOGREL BISULFATE 75 MG: 75 TABLET ORAL at 08:16

## 2021-08-14 RX ADMIN — SODIUM CHLORIDE: 9 INJECTION, SOLUTION INTRAVENOUS at 21:29

## 2021-08-14 RX ADMIN — ENOXAPARIN SODIUM 40 MG: 40 INJECTION SUBCUTANEOUS at 08:17

## 2021-08-14 RX ADMIN — SODIUM CHLORIDE: 9 INJECTION, SOLUTION INTRAVENOUS at 16:15

## 2021-08-14 RX ADMIN — ACETAMINOPHEN 1000 MG: 500 TABLET ORAL at 00:26

## 2021-08-14 RX ADMIN — CARBIDOPA AND LEVODOPA 1 TABLET: 25; 100 TABLET ORAL at 12:49

## 2021-08-14 RX ADMIN — CARBIDOPA AND LEVODOPA 1 TABLET: 25; 100 TABLET ORAL at 08:16

## 2021-08-14 RX ADMIN — METOPROLOL TARTRATE 12.5 MG: 25 TABLET, FILM COATED ORAL at 08:16

## 2021-08-14 RX ADMIN — CARBIDOPA AND LEVODOPA 1 TABLET: 25; 100 TABLET ORAL at 21:30

## 2021-08-14 RX ADMIN — ALLOPURINOL 100 MG: 100 TABLET ORAL at 08:16

## 2021-08-14 RX ADMIN — Medication 400 MG: at 08:17

## 2021-08-14 RX ADMIN — CARBIDOPA AND LEVODOPA 1 TABLET: 25; 100 TABLET ORAL at 16:16

## 2021-08-14 RX ADMIN — METOPROLOL TARTRATE 12.5 MG: 25 TABLET, FILM COATED ORAL at 21:29

## 2021-08-14 RX ADMIN — ACETAMINOPHEN 650 MG: 325 TABLET, FILM COATED ORAL at 08:44

## 2021-08-14 ASSESSMENT — PAIN SCALES - GENERAL
PAINLEVEL_OUTOF10: 2
PAINLEVEL_OUTOF10: 5
PAINLEVEL_OUTOF10: 3

## 2021-08-14 NOTE — ED PROVIDER NOTES
[address] [facility]   Emergency Department  Faculty Attestation       I performed a history and physical examination of the patient and discussed management with the resident. I reviewed the residents note and agree with the documented findings including all diagnostic interpretations and plan of care. Any areas of disagreement are noted on the chart. I was personally present for the key portions of any procedures. I have documented in the chart those procedures where I was not present during the key portions. I have reviewed the emergency nurses triage note. I agree with the chief complaint, past medical history, past surgical history, allergies, medications, social and family history as documented unless otherwise noted below. Documentation of the HPI, Physical Exam and Medical Decision Making performed by scribpaige is based on my personal performance of the HPI, PE and MDM. For Physician Assistant/ Nurse Practitioner cases/documentation I have personally evaluated this patient and have completed at least one if not all key elements of the E/M (history, physical exam, and MDM). Additional findings are as noted. Pertinent Comments     Primary Care Physician: Mickie Tatum, APRN - CNP      ED Triage Vitals   BP Temp Temp Source Pulse Resp SpO2 Height Weight   08/13/21 1930 08/13/21 1930 08/13/21 1930 08/13/21 1930 08/13/21 1930 08/13/21 1930 08/13/21 2043 08/13/21 1930   113/63 97.8 °F (36.6 °C) Oral 62 16 99 % 5' 7\" (1.702 m) 170 lb (77.1 kg)        History/Physical: This is a 79 y.o. male who presents to the Emergency Department with complaint of fatigue and fall. Patient was just admitted to the hospital at [facility] for similar symptoms where he is found to be hyponatremic. Had work-up in MRI at that time. Had just been recently discharged and had a fall this morning. And feels very fatigued and feels like he cannot walk due to fatigue.   Next  On exam, patient appears chronically ill, but is not in acute distress. Normocephalic atraumatic with no signs of basilar skull fracture. Does have some mild neck tenderness, but normal range of motion of the neck. Heart sounds regular lungs clear auscultation. Abdomen soft nontender nondistended. He is alert and oriented x4, has a baseline tremor secondary Parkinson's. He does have good strength and sensation throughout. However does appear to fatigue out very quickly. No exposed rashes. MDM/Plan:   Generalized fatigue. Recurrent falls. No obvious signs of trauma, but given the patient is over 65 had a fall and did strike his head will get a CT head. Also get CT cervical spine  Basic labs    Patient's work-up does not have any significant findings, however patient cannot ambulate by himself at home and is unsafe to go back home at this time. Given that he is been having recurrent falls, generalized fatigue, patient is now willing to consider going to [facility] long-term which is initially resistant to. Will admit    EKG Interpretation    Interpreted by emergency department physician    Rhythm: sinus bradycardia  Rate: 52  Axis: normal  Ectopy: none  Conduction: right bundle branch block (incomplete)  ST Segments: nonspecific changes  T Waves: non specific changes  Q Waves: nonspecific    Clinical Impression: Nonspecific EKG with sinus bradycardia rate of 52, incomplete right bundle branch block. There is nonspecific ST and T wave findings. Compared to EKG on 8/9/2021, the rate has decreased from 61-52, but otherwise unchanged.     Prabha Ames MD    Critical Care: None     Prabha Ames MD  Attending Emergency Physician         Prabha Ames MD  08/13/21 5234

## 2021-08-14 NOTE — PROGRESS NOTES
Patient arrived via stretcher to Room 2062. Patient alert and oriented . White board updated. Dr. Anushka Eagle notified of admission. Will await new orders.

## 2021-08-14 NOTE — ED PROVIDER NOTES
[address]  Emergency Department Encounter  EmergencyMedicine Resident     Pt Ally Rivas  MRN: [id number]  [place] 1940  Date of evaluation: 8/13/21  PCP:  Mickie Tatum, [address] [place] [address]       Chief Complaint   Patient presents with    Fall    Fatigue       HISTORY OF PRESENT ILLNESS  (Location/Symptom, Timing/Onset, Context/Setting, Quality, Duration, Modifying Factors, Severity.)      Qing Choe is a 79 y.o. male who presents with increasing fatigue with lightheadedness that resulted in a fall earlier today. Recently admitted to [facility] for fatigue and failure to thrive. Patient was evaluated for long-term care facility, patient was initially disagreement with this activities. Patient now in agreement for long-term care facility. Patient states that he just felt extremely weak, fell and hit his head, was recently on Lovenox while at [facility] discharged yesterday. Patient states that he has some chest pain, midsternal, described as burning, nonradiating. Patient denies any nausea, vomiting shortness of breath, abdominal pain, change in urination or bowel habits. 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 No additional pertinent     has a past surgical history that includes Colonoscopy (11/02/2012); shoulder surgery (Right); pr colsc flx w/rmvl of tumor polyp lesion snare tq (N/A, 9/19/2017); and Colonoscopy (09/19/2017).   No additional pertinent    Social History     Socioeconomic History    Marital status: Single     Spouse name: Not on file    Number of children: Not on file    Years of education: Not on file    Highest education level: Not on file   Occupational History    Not on file   Tobacco Use    Smoking status: Former Smoker    Smokeless tobacco: Never Used   Vaping Use    Vaping Use: Never used   Substance and Sexual Activity    Alcohol use: No    Drug use: No    Sexual activity: Not Currently   Other Topics Concern    Not on file   Social History Narrative    Not on file     Social Determinants of Health     Financial Resource Strain: Low Risk     Difficulty of Paying Living Expenses: Not very hard   Food Insecurity: No Food Insecurity    Worried About Running Out of Food in the Last Year: Never true    Destiny of Food in the Last Year: Never true   Transportation Needs: Unknown    Lack of Transportation (Medical): Patient refused    Lack of Transportation (Non-Medical): Patient refused   Physical Activity: Insufficiently Active    Days of Exercise per Week: 2 days    Minutes of Exercise per Session: 20 min   Stress: No Stress Concern Present    Feeling of Stress : Only a little   Social Connections:     Frequency of Communication with Friends and Family:     Frequency of Social Gatherings with Friends and Family:     Attends [employer] Services:     Active Member of Clubs or Organizations:     Attends Club or Organization Meetings:     Marital Status:    Intimate Partner Violence:     Fear of Current or Ex-Partner:     Emotionally Abused:     Physically Abused:     Sexually Abused:        Family History   Problem Relation Age of Onset    No Known Problems Mother     No Known Problems Father        Allergies:  Dye [iodides] and Aspirin    Home Medications:  Prior to Admission medications    Medication Sig Start Date End Date Taking?  Authorizing Provider   metoprolol tartrate (LOPRESSOR) 25 MG tablet Take 0.5 tablets by mouth 2 times daily 8/11/21   Morgan Kaiser MD   nitroGLYCERIN (NITROSTAT) 0.4 MG SL tablet Place 1 tablet under the tongue every 5 minutes as needed for Chest pain If no relief of chest pain after 3rd dose, go to the ER or call 911 6/14/21   JW Iniguez CNP   atorvastatin (LIPITOR) 40 MG tablet take 1 tablet by mouth once daily 6/14/21   JW Iniguez CNP   allopurinol (ZYLOPRIM) 100 MG tablet Take 1 tablet by mouth daily 6/14/21   JW Iniguez CNP   acetaminophen (TYLENOL 8 HOUR ARTHRITIS PAIN) 650 MG extended release tablet Take 1 tablet by mouth every 8 hours as needed for Pain 6/14/21   JW Iniguez CNP   butalbital-acetaminophen-caffeine (FIORICET, ESGIC) -40 MG per tablet Take 1 tab prn only for severe headache. Can repeat after 4 hrs if needed. Max 2 tabs/24 hrs. Max 4 tabs/week 6/14/21   JW Iniguez CNP   carbidopa-levodopa (SINEMET)  MG per tablet Take 1 tablet by mouth 4 times daily 6/14/21 9/12/21  JW Iniguez CNP   clopidogrel (PLAVIX) 75 MG tablet Take 1 tablet by mouth daily 6/14/21   JW Iniguez CNP   esomeprazole (NEXIUM) 20 MG delayed release capsule Take 1 capsule by mouth every morning (before breakfast) 6/14/21   JW Iniguez CNP   magnesium oxide (MAG-OX) 400 MG tablet Take 1 tablet by mouth daily 6/14/21   JW Iniguez CNP       REVIEW OF SYSTEMS    (2-9 systems for level 4, 10 or more for level 5)      Review of Systems   Constitutional: Positive for fatigue. Negative for chills and fever. HENT: Negative for congestion. Respiratory: Negative for chest tightness and shortness of breath. Cardiovascular: Negative for chest pain and leg swelling. Gastrointestinal: Negative for abdominal pain, constipation, diarrhea, nausea and vomiting. Endocrine: Negative for polyuria. Genitourinary: Negative for difficulty urinating.    Skin: Negative for color change. Neurological: Negative for dizziness, weakness, light-headedness and headaches. Psychiatric/Behavioral: Negative for confusion. PHYSICAL EXAM   (up to 7 for level 4, 8 or more for level 5)      INITIAL VITALS:   /63   Pulse 62   Temp 97.8 °F (36.6 °C) (Oral)   Resp 16   Ht 5' 7\" (1.702 m)   Wt 170 lb (77.1 kg)   SpO2 99%   BMI 26.63 kg/m²     Physical Exam  Constitutional:       Appearance: He is ill-appearing. Comments: Cachectic in appearance, skinny. HENT:      Head: Normocephalic and atraumatic. Mouth/Throat:      Mouth: Mucous membranes are moist.      Pharynx: Oropharynx is clear. Eyes:      Extraocular Movements: Extraocular movements intact. Conjunctiva/sclera: Conjunctivae normal.   Cardiovascular:      Rate and Rhythm: Normal rate and regular rhythm. Pulses: Normal pulses. Heart sounds: Normal heart sounds. No murmur heard. Pulmonary:      Effort: Pulmonary effort is normal.      Breath sounds: Normal breath sounds. Abdominal:      General: Bowel sounds are normal. There is no distension. Tenderness: There is abdominal tenderness (Abdominal tenderness over the area of Lovenox shots). There is no guarding. Musculoskeletal:      Comments: Patient unable to ambulate, does not have a steady gait. Skin:     General: Skin is warm and dry. Findings: No rash (On exposed skin). Comments: No outward signs of trauma, Patient does have bruising on his abdomen where he received Lovenox shots, no other bruising noted. Neurological:      General: No focal deficit present. Mental Status: He is alert and oriented to person, place, and time. Motor: Weakness present.       Gait: Gait abnormal.   Psychiatric:         Mood and Affect: Mood normal.         Behavior: Behavior normal.         DIFFERENTIAL  DIAGNOSIS     PLAN (LABS / IMAGING / EKG):  Orders Placed This Encounter   Procedures    XR CHEST PORTABLE    CT HEAD WO CONTRAST    CT CERVICAL SPINE WO CONTRAST    CBC Auto Differential    Comprehensive Metabolic Panel w/ Reflex to MG    Lipase    Troponin    Brain Natriuretic Peptide    Urinalysis Reflex to Culture    Lactic Acid    Inpatient consult to Primary Care Provider    EKG 12 Lead    Insert peripheral IV       MEDICATIONS ORDERED:  No orders of the defined types were placed in this encounter.       DIAGNOSTIC RESULTS / EMERGENCY DEPARTMENT COURSE / MDM   LAB RESULTS:  Results for orders placed or performed during the hospital encounter of 08/13/21   CBC Auto Differential   Result Value Ref Range    WBC 5.3 3.5 - 11.0 k/uL    RBC 4.29 (L) 4.5 - 5.9 m/uL    Hemoglobin 13.7 13.5 - 17.5 g/dL    Hematocrit 40.2 (L) 41 - 53 %    MCV 93.7 80 - 100 fL    MCH 31.9 26 - 34 pg    MCHC 34.1 31 - 37 g/dL    RDW 15.2 (H) 11.5 - 14.9 %    Platelets 562 853 - 433 k/uL    MPV 7.0 6.0 - 12.0 fL    NRBC Automated NOT REPORTED per 100 WBC    Differential Type NOT REPORTED     Seg Neutrophils 63 36 - 66 %    Lymphocytes 21 (L) 24 - 44 %    Monocytes 11 (H) 1 - 7 %    Eosinophils % 4 0 - 4 %    Basophils 1 0 - 2 %    Immature Granulocytes NOT REPORTED 0 %    Segs Absolute 3.30 1.3 - 9.1 k/uL    Absolute Lymph # 1.10 1.0 - 4.8 k/uL    Absolute Mono # 0.60 0.1 - 1.3 k/uL    Absolute Eos # 0.20 0.0 - 0.4 k/uL    Basophils Absolute 0.10 0.0 - 0.2 k/uL    Absolute Immature Granulocyte NOT REPORTED 0.00 - 0.30 k/uL    WBC Morphology NOT REPORTED     RBC Morphology NOT REPORTED     Platelet Estimate NOT REPORTED    Comprehensive Metabolic Panel w/ Reflex to MG   Result Value Ref Range    Glucose 109 (H) 70 - 99 mg/dL    BUN 13 8 - 23 mg/dL    CREATININE 1.42 (H) 0.70 - 1.20 mg/dL    Bun/Cre Ratio NOT REPORTED 9 - 20    Calcium 9.3 8.6 - 10.4 mg/dL    Sodium 133 (L) 135 - 144 mmol/L    Potassium 4.3 3.7 - 5.3 mmol/L    Chloride 100 98 - 107 mmol/L    CO2 23 20 - 31 mmol/L    Anion Gap 10 9 - 17 mmol/L    Alkaline Phosphatase 198 (H) 40 - 129 U/L     (H) 5 - 41 U/L    AST 40 (H) <40 U/L    Total Bilirubin 0.43 0.3 - 1.2 mg/dL    Total Protein 7.4 6.4 - 8.3 g/dL    Albumin 4.1 3.5 - 5.2 g/dL    Albumin/Globulin Ratio NOT REPORTED 1.0 - 2.5    GFR Non- 48 (L) >60 mL/min    GFR  58 (L) >60 mL/min    GFR Comment          GFR Staging NOT REPORTED    Lipase   Result Value Ref Range    Lipase 33 13 - 60 U/L   Troponin   Result Value Ref Range    Troponin, High Sensitivity 22 0 - 22 ng/L    Troponin T NOT REPORTED <0.03 ng/mL    Troponin Interp NOT REPORTED    Brain Natriuretic Peptide   Result Value Ref Range    Pro- (H) <300 pg/mL    BNP Interpretation Pro-BNP Reference Range:    Urinalysis Reflex to Culture    Specimen: Urine, clean catch   Result Value Ref Range    Color, UA YELLOW YELLOW    Turbidity UA CLEAR CLEAR    Glucose, Ur NEGATIVE NEGATIVE    Bilirubin Urine NEGATIVE NEGATIVE    Ketones, Urine NEGATIVE NEGATIVE    Specific Gravity, UA 1.006 1.000 - 1.030    Urine Hgb NEGATIVE NEGATIVE    pH, UA 6.5 5.0 - 8.0    Protein, UA NEGATIVE NEGATIVE    Urobilinogen, Urine Normal Normal    Nitrite, Urine NEGATIVE NEGATIVE    Leukocyte Esterase, Urine NEGATIVE NEGATIVE    Urinalysis Comments       Microscopic exam not performed based on chemical results unless requested in original order. Lactic Acid   Result Value Ref Range    Lactic Acid 1.1 0.5 - 2.2 mmol/L   EKG 12 Lead   Result Value Ref Range    Ventricular Rate 52 BPM    Atrial Rate 52 BPM    P-R Interval 176 ms    QRS Duration 106 ms    Q-T Interval 454 ms    QTc Calculation (Bazett) 422 ms    P Axis 63 degrees    R Axis 31 degrees    T Axis -2 degrees       RADIOLOGY:  CT CERVICAL SPINE WO CONTRAST   Final Result   Multilevel degenerate change. No evidence fracture traumatic malalignment         CT HEAD WO CONTRAST   Final Result   Mild central and cortical cerebral atrophy.       Mild chronic deep white matter ischemic changes      No acute intracranial abnormalities are noted. XR CHEST PORTABLE   Final Result   No acute cardiopulmonary abnormality. EKG  EKG Interpretation  August 13, 2021 at 2157  Interpreted by emergency department physician    Rhythm: sinus bradycardia  Rate: 50-60  Axis: normal  Ectopy: none  Conduction: right bundle branch block (incomplete)  ST Segments: no acute change  T Waves: flattening in  lateral leads  Q Waves: I and aVl    Clinical Impression: right bundle branch block    Andrzej Jarrett,      All EKG's are interpreted by the Emergency Department Physician who either signs or Co-signs this chart in the absence of a cardiologist.    EMERGENCY DEPARTMENT COURSE:  ED Course as of Aug 13 2329   Fri Aug 13, 2021   2231 Alk Phos(!): 198 [CR]   2323 Discussed with Dr. Juan Corrales, plan to admit patient. [CR]      ED Course User Index  [CR] Marino Donnelly DO          PROCEDURES:  None    CONSULTS:  IP CONSULT TO PRIMARY CARE PROVIDER    MEDICAL DECISION MAKING:  Patient presenting with increased fatigue, noted to be falling having difficulty ambulating around his house. [facility] for hyponatremia and generalized fatigue. Discussion was had with patient about going into a nursing home, patient was not ready at this time. Patient was discharged home, after arrival at home last night patient continued to have generalized fatigue, and reports having a fall earlier today where he hit his head. Patient noted to have no outward signs of trauma but patient was on Lovenox, not on blood thinners regularly. Will obtain a CT head, patient's neurological exam is normal though he does present with weakness and slowed affect. Will obtain CBC, BMP, cardiac work-up with troponin, EKG, chest x-ray, will also obtain a CT C-spine as patient does have some C-spine tenderness.   Will also evaluate lactic acid's for concerns of potential sepsis nature of falls.  Plan to obtain urine for evaluation of urinary tract infection. Noted to have no traumatic head and C-spine injury on CT scan. Chest x-ray demonstrated no acute pulmonary or cardio pathology. Patient continued generalized fatigue, labs identified increasing AST and ALT, elevated creatinine has been creeping up. Troponin was noted to be negative. Patient's BMP was noted to be elevated slightly from previous visit at [facility]. Patient unable to ambulate with a steady gait here, still continued to complain of weakness. Plan to admit for generalized fatigue, fall concern, and further social evaluation for potential nursing home as patient is agreeable to this plan at this time. CRITICAL CARE:  Please see attending note    FINAL IMPRESSION      1. Fatigue, unspecified type          DISPOSITION / PLAN     DISPOSITION Decision To Admit 08/13/2021 11:00:24 PM      PATIENT REFERRED TO:  No follow-up provider specified.     DISCHARGE MEDICATIONS:  New Prescriptions    No medications on file       Margo Gomez[address], DO  Emergency Medicine Resident    (Please note that portions of thisnote were completed with a voice recognition program.  Efforts were made to edit the dictations but occasionally words are mis-transcribed.)       Kevin Whalen DO  Resident  08/13/21 2718

## 2021-08-15 LAB
EKG ATRIAL RATE: 52 BPM
EKG P AXIS: 63 DEGREES
EKG P-R INTERVAL: 176 MS
EKG Q-T INTERVAL: 454 MS
EKG QRS DURATION: 106 MS
EKG QTC CALCULATION (BAZETT): 422 MS
EKG R AXIS: 31 DEGREES
EKG T AXIS: -2 DEGREES
EKG VENTRICULAR RATE: 52 BPM

## 2021-08-15 PROCEDURE — 96372 THER/PROPH/DIAG INJ SC/IM: CPT

## 2021-08-15 PROCEDURE — 93010 ELECTROCARDIOGRAM REPORT: CPT | Performed by: INTERNAL MEDICINE

## 2021-08-15 PROCEDURE — G0378 HOSPITAL OBSERVATION PER HR: HCPCS

## 2021-08-15 PROCEDURE — 97116 GAIT TRAINING THERAPY: CPT

## 2021-08-15 PROCEDURE — 6360000002 HC RX W HCPCS: Performed by: FAMILY MEDICINE

## 2021-08-15 PROCEDURE — 97162 PT EVAL MOD COMPLEX 30 MIN: CPT

## 2021-08-15 PROCEDURE — 6370000000 HC RX 637 (ALT 250 FOR IP): Performed by: FAMILY MEDICINE

## 2021-08-15 RX ADMIN — PANTOPRAZOLE SODIUM 40 MG: 40 TABLET, DELAYED RELEASE ORAL at 06:25

## 2021-08-15 RX ADMIN — ENOXAPARIN SODIUM 40 MG: 40 INJECTION SUBCUTANEOUS at 08:16

## 2021-08-15 RX ADMIN — CLOPIDOGREL BISULFATE 75 MG: 75 TABLET ORAL at 08:15

## 2021-08-15 RX ADMIN — ALLOPURINOL 100 MG: 100 TABLET ORAL at 08:14

## 2021-08-15 RX ADMIN — METOPROLOL TARTRATE 12.5 MG: 25 TABLET, FILM COATED ORAL at 20:11

## 2021-08-15 RX ADMIN — ACETAMINOPHEN 650 MG: 325 TABLET, FILM COATED ORAL at 01:38

## 2021-08-15 RX ADMIN — CARBIDOPA AND LEVODOPA 1 TABLET: 25; 100 TABLET ORAL at 09:09

## 2021-08-15 RX ADMIN — CARBIDOPA AND LEVODOPA 1 TABLET: 25; 100 TABLET ORAL at 14:03

## 2021-08-15 RX ADMIN — ACETAMINOPHEN 650 MG: 325 TABLET, FILM COATED ORAL at 08:14

## 2021-08-15 RX ADMIN — Medication 400 MG: at 08:16

## 2021-08-15 RX ADMIN — CARBIDOPA AND LEVODOPA 1 TABLET: 25; 100 TABLET ORAL at 20:12

## 2021-08-15 RX ADMIN — CARBIDOPA AND LEVODOPA 1 TABLET: 25; 100 TABLET ORAL at 17:38

## 2021-08-15 ASSESSMENT — PAIN SCALES - GENERAL
PAINLEVEL_OUTOF10: 3
PAINLEVEL_OUTOF10: 3
PAINLEVEL_OUTOF10: 0

## 2021-08-15 ASSESSMENT — PAIN DESCRIPTION - PAIN TYPE: TYPE: ACUTE PAIN

## 2021-08-15 ASSESSMENT — PAIN DESCRIPTION - LOCATION: LOCATION: HEAD

## 2021-08-15 ASSESSMENT — PAIN DESCRIPTION - DESCRIPTORS: DESCRIPTORS: HEADACHE

## 2021-08-15 NOTE — PROGRESS NOTES
Physical Therapy    Facility/Department: [facility] MED SURG  Initial Assessment    NAME: Manan Tillman  : 1940  MRN: [id number]    Date of Service: 8/15/2021    Discharge Recommendations:  Patient would benefit from continued therapy after discharge        Assessment   Body structures, Functions, Activity limitations: Decreased functional mobility ; Decreased strength;Decreased safe awareness;Decreased endurance;Decreased balance;Decreased coordination;Decreased posture  Assessment: Pt presents with genralized weakness, needs assist for mobility, is high fall risk, not safe to return home at this level without assist for fucntional mobility. Recommend continued therapy at discharge. Treatment Diagnosis: Impaired fucntion  Prognosis: Fair  Decision Making: Medium Complexity  History: Parkinson's, Colon CA, OA. PT Education: Goals;Transfer Training;PT Role;Functional Mobility Training;Plan of Care;General Safety;Precautions  REQUIRES PT FOLLOW UP: Yes  Activity Tolerance  Activity Tolerance: Patient limited by fatigue;Patient limited by endurance       Patient Diagnosis(es): The primary encounter diagnosis was Fatigue, unspecified type. A diagnosis of Frequent falls was also pertinent to this visit. 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    has a past surgical history that includes Colonoscopy (2012); shoulder surgery (Right); pr colsc flx w/rmvl of tumor polyp lesion snare tq (N/A, 2017); and Colonoscopy (09/19/2017). Restrictions  Restrictions/Precautions  Restrictions/Precautions: Fall Risk, Contact Precautions (Hx MRSA)  Required Braces or Orthoses?: No  Implants present? :  (Right shoulder surgery)  Position Activity Restriction  Other position/activity restrictions: Hx of parkinson's. Pt was recently at [facility], was recommended to go to SNF, but pt had declines at that time. Vision/Hearing  Vision: Impaired  Vision Exceptions: Wears glasses at all times  Hearing: Within functional limits     Subjective  General  Chart Reviewed: Yes  Additional Pertinent Hx: The patient is a 79 y.o. male who presents to [facility] with increasing fatigue with lightheadedness that resulted in a fall on the day of admission. Patient was admitted to [facility] recently for fatigue and failure to thrive and evaluation for SNF admission, but declined   Referring Practitioner: Dr Aspen Frank  Referral Date : 08/14/21  Diagnosis: Fall, weakness  Follows Commands: Within Functional Limits  Subjective  Subjective: Reports he is tired, wants to sleep,agreeable for therapy first and than going back to sleep.   Pain Screening  Patient Currently in Pain: No  Vital Signs  Patient Currently in Pain: No  Patient Observation  Observations: B UE tremors noted       Orientation  Orientation  Overall Orientation Status: Within Functional Limits  Social/Functional History  Social/Functional History  Lives With: Alone  Type of Home: House  Home Layout: Two level, Able to Live on Main level with bedroom/bathroom (Occasionally goes to 2nd floor)  Home Access: Stairs to enter with rails  Entrance Stairs - Number of Steps: 4  Entrance Stairs - Rails: Both  Bathroom Shower/Tub: Walk-in shower, Shower chair with back, Curtain  Bathroom Toilet: Standard  Bathroom Equipment: Hand-held shower  Bathroom Accessibility: Accessible  Home Equipment: Rolling walker, Cane  Receives Help From: Other (comment) (Neighbor)  ADL Assistance: Musa Kirby Assistance: Independent  Ambulation Assistance: Independent (St cane)  Transfer Assistance: Independent  Active : Yes  Mode of Transportation: Car  Additional Comments: Emily has good neighbor, she assist with shopping and clening. Emily has a son Cecilio Jalloh, he works, he some times comes and cheks on him. Cognition        Objective          AROM RLE (degrees)  RLE AROM: WFL  AROM LLE (degrees)  LLE AROM : WFL  AROM RUE (degrees)  RUE General AROM: Tremors B hands/forarm  AROM LUE (degrees)  LUE General AROM: Tremors B UE/hand/forearm  Strength RLE  Comment: Grossly 4-/5  Strength LLE  Comment: 4-/5     Sensation  Overall Sensation Status: Impaired  Additional Comments: Pt states numbness and tingling \"comes and goes  Bed mobility  Rolling to Left: Minimal assistance  Rolling to Right: Minimal assistance; Moderate assistance  Supine to Sit: Minimal assistance; Moderate assistance  Sit to Supine: Minimal assistance  Scooting: Minimal assistance  Comment: Pt needs asssit with trunk for bed mobility coming out of bed,needs assist with trunk as welll as legs getting back in bed. Transfers  Sit to Stand: Minimal Assistance  Stand to sit: Minimal Assistance  Ambulation  Ambulation?: Yes  Ambulation 1  Surface: level tile  Device: Rolling Walker  Assistance: Minimal assistance; Moderate assistance (Mod a for turns, as pt picks up his walker off the floor)  Quality of Gait: Short stpe, stooped psoture, delayed initiation of steps, once got started able to maintain rythm.   Distance: 60 ft  Comments: Did not report dizzyness during mobility     Balance  Posture: Fair  Sitting - Static: Fair  Sitting - Dynamic: Fair;-  Standing - Static: Fair  Standing - Dynamic: Fair;-  Comments: Lcuretia with Rolling walker        Plan   Plan  Times per week: 5 to 6x/week  Current Treatment Recommendations: Strengthening, Balance Training, Functional Mobility Training, Transfer Training, Endurance Training, Gait Training, Safety Education & Training, Patient/Caregiver Education & Training  Safety Devices  Type of devices: Gait belt, Call light within reach, Bed alarm in place, Left in bed    G-Code       OutComes Score                                                  AM-PAC Score             Goals  Short term goals  Time Frame for Short term goals: 5 to 6 days  Short term goal 1: Pt able to perform supine<.sit CGA  Short term goal 2:  Pt able to transfer SBA/CGA  Short term goal 3: Pt able to ambulate with rolling walker distance of 100 ft CGA  Short term goal 4: Pt cece to participate in strengthening/balance ex's for 20 to 30 minutes to improve fucntion.   Patient Goals   Patient goals : Not stated       Therapy Time   Individual Concurrent Group Co-treatment   Time In 0956         Time Out 1030         Minutes 34         Timed Code Treatment Minutes: 12 Minutes       Sarah Mendez PT

## 2021-08-15 NOTE — DISCHARGE SUMMARY
[facility]     Department of Internal Medicine - Staff Internal Medicine Teaching Service    INPATIENT DISCHARGE SUMMARY      Patient Identification:  Zehra Black is a 79 y.o. male. :  1940  MRN: [id number]     Acct: [de-identified]   PCP: JW Oden CNP  Admit Date:  2021  Discharge date and time: 2021  4:15 PM   Attending Provider: No att. providers found                                     [facility] Problem Lists:  Principal Problem:    Dizziness  Active Problems:    Essential hypertension    Parkinson disease ([address]    Hyponatremia  Resolved Problems:    * No resolved hospital problems. *      HOSPITAL STAY     Brief Inpatient course:   Zehra Black is a 79 y.o. male who was admitted for the management of Dizziness, presented to the emergency department with headache and dizziness. The headache is chronic and present all over the head. No focal neuro deficits are noted. No history of fever, chest pain shortness of breath, abdominal pain is reported. Patient stated headache started after subdural hematoma ,history of fall back in . Patient has known Parkinson disease taking Sinemet he reports dizziness. His lab work showed hyponatremia he was treated with IV fluid patient got improved. But still complaining of headache and off and on dizziness. His hyponatremia was corrected and dizziness/headaches were improved he was advised Follow-up with PCP and neurology.           Consults:     Consults:     Final Specialist Recommendations/Findings:   IP CONSULT TO INTERNAL MEDICINE  IP CONSULT TO CASE MANAGEMENT  IP CONSULT TO HOME CARE NEEDS      Any Hospital Acquired Infections: none    Discharge Functional Status:  stable    DISCHARGE PLAN     Disposition: home    Patient Instructions:   Discharge Medication List as of 2021  3:11 PM        CONTINUE these medications which have CHANGED    Details   metoprolol tartrate (LOPRESSOR) 25 MG tablet Take 0.5 tablets by mouth 2 times daily, Disp-60 tablet, R-3Normal           CONTINUE these medications which have NOT CHANGED    Details   nitroGLYCERIN (NITROSTAT) 0.4 MG SL tablet Place 1 tablet under the tongue every 5 minutes as needed for Chest pain If no relief of chest pain after 3rd dose, go to the ER or call 911, Disp-25 tablet, R-0Adjust Sig      atorvastatin (LIPITOR) 40 MG tablet take 1 tablet by mouth once daily, Disp-90 tablet, R-3Please use bubble packsNormal      allopurinol (ZYLOPRIM) 100 MG tablet Take 1 tablet by mouth daily, Disp-90 tablet, R-1Please use bubble packsNormal      acetaminophen (TYLENOL 8 HOUR ARTHRITIS PAIN) 650 MG extended release tablet Take 1 tablet by mouth every 8 hours as needed for Pain, Disp-90 tablet, R-5Adjust Sig      butalbital-acetaminophen-caffeine (FIORICET, ESGIC) -40 MG per tablet Take 1 tab prn only for severe headache. Can repeat after 4 hrs if needed. Max 2 tabs/24 hrs.  Max 4 tabs/week, Disp-28 tablet, R-3, DAWAdjust Sig      carbidopa-levodopa (SINEMET)  MG per tablet Take 1 tablet by mouth 4 times daily, Disp-360 tablet, R-1Please use bubble packsNormal      clopidogrel (PLAVIX) 75 MG tablet Take 1 tablet by mouth daily, Disp-90 tablet, R-1Please use bubble packsNormal      esomeprazole (NEXIUM) 20 MG delayed release capsule Take 1 capsule by mouth every morning (before breakfast), Disp-90 capsule, R-1Please use bubble packsNormal      magnesium oxide (MAG-OX) 400 MG tablet Take 1 tablet by mouth daily, Disp-90 tablet, R-1Please use bubble packsNormal             Activity: activity as tolerated    Diet: regular diet    Follow-up:    Jayden Bhat, WJ - CNP  [address] [facility]  [address] [facility],[address]  [address] [facility] [address]  [phone]    In 3 days  Hospital follow up for dizziness    Beth Londono, [address]   [facility] [address] [facility]  [phone]    In 2 weeks  Hospital follow up for migraine/dizziness      Patient Instructions: continue follow up with neurology and PCP  Follow up labs: Follow up imaging:    Note that over 30 minutes was spent in preparing discharge papers, discussing discharge with patient, medication review, etc.      Mariah Loyola MD, MD  Internal Medicine Resident, PGY-1  [address] [place], [place]  8/15/2021, 7:48 AM

## 2021-08-15 NOTE — PLAN OF CARE
Problem: Falls - Risk of:  Goal: Will remain free from falls  Description: Will remain free from falls  Outcome: Ongoing  Note: Patient remains free of incidence/ injury. Bed remains in low position. Problem: Pain:  Goal: Control of acute pain  Description: Control of acute pain  Outcome: Ongoing  Note: Patient expresses relief following administration of prn pain medication.

## 2021-08-15 NOTE — PLAN OF CARE
Problem: Falls - Risk of:  Goal: Will remain free from falls  8/15/2021 1551 by Gavino Trevino RN  Outcome: Ongoing  8/15/2021 0539 by Cecelia Cowden, RN  Outcome: Ongoing  Goal: Absence of physical injury  Outcome: Ongoing     Problem: Pain:  Goal: Pain level will decrease  Outcome: Ongoing  Goal: Control of acute pain  8/15/2021 1551 by Gavino Trevino RN  Outcome: Ongoing  8/15/2021 0539 by Cecelia Cowden, RN  Outcome: Ongoing  Goal: Control of chronic pain  Outcome: Met This Shift     Problem: Musculor/Skeletal Functional Status  Goal: Highest potential functional level  Outcome: Ongoing  Goal: Absence of falls  Outcome: Ongoing

## 2021-08-15 NOTE — H&P
Family Medicine Admit Note    PCP: Ines Medina, JW - CNP    Date of Admission: 8/13/2021    Date of Service: Pt seen/examined on 8/15/2021 and Placed in Observation. Chief Complaint:  Generalized weakness      History Of Present Illness: The patient is a 79 y.o. male who presents to [facility] with increasing fatigue with lightheadedness that resulted in a fall on the day of admission. Patient was admitted to [facility] recently for fatigue and failure to thrive and evaluation for SNF admission, but declined said therapy. 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 ([facility] 3/18/2011    d/t being hurt on the job    CKD (chronic kidney disease) stage 2, GFR 60-89 ml/min     CKD (chronic kidney disease) stage 3, GFR 30-59 ml/min ([facility])     Diverticulosis of colon     GERD (gastroesophageal reflux disease)     Impaired renal function     MRSA (methicillin resistant staph aureus) culture positive 9/23/2015    leg    Osteoarthritis of knee     Left    Parkinson disease ([facility]     Proteinuria     Skull fracture ([facility] 3/18/2011    d/t 12-foot drop on the job    Temporary loss of eyesight     periodically, happened x7    [place]    [facility]       Past Surgical History:        Procedure Laterality Date    COLONOSCOPY  11/02/2012    poor prep, tubular adenoma    COLONOSCOPY  09/19/2017    diverticulosis, multiple polyps as described, spot marking done, pathology-tubular adenoma x 4    OR COLSC FLX W/RMVL OF TUMOR POLYP LESION SNARE TQ N/A 9/19/2017    COLONOSCOPY POLYPECTOMY SNARE/COLD BIOPSY with tatooing and apc transverse colon performed by Colette Vaughan MD at [address] [place] [address] Right     rotator cuff       Medications Prior to Admission:    Prior to Admission medications    Medication Sig Start Date End Date Taking?  Authorizing Provider   metoprolol tartrate (LOPRESSOR) 25 MG tablet Take 0.5 tablets by mouth 2 times daily 8/11/21   Thania Hatfield MD   nitroGLYCERIN (NITROSTAT) 0.4 MG SL tablet Place 1 tablet under the tongue every 5 minutes as needed for Chest pain If no relief of chest pain after 3rd dose, go to the ER or call 911 6/14/21   JW Reilly CNP   atorvastatin (LIPITOR) 40 MG tablet take 1 tablet by mouth once daily 6/14/21   JW Reilly CNP   allopurinol (ZYLOPRIM) 100 MG tablet Take 1 tablet by mouth daily 6/14/21   JW Reilly CNP   acetaminophen (TYLENOL 8 HOUR ARTHRITIS PAIN) 650 MG extended release tablet Take 1 tablet by mouth every 8 hours as needed for Pain 6/14/21   JW Reilly CNP   butalbital-acetaminophen-caffeine (FIORICET, ESGIC) -40 MG per tablet Take 1 tab prn only for severe headache. Can repeat after 4 hrs if needed. Max 2 tabs/24 hrs. Max 4 tabs/week 6/14/21   JW Reilly CNP   carbidopa-levodopa (SINEMET)  MG per tablet Take 1 tablet by mouth 4 times daily 6/14/21 9/12/21  JW Reilly CNP   clopidogrel (PLAVIX) 75 MG tablet Take 1 tablet by mouth daily 6/14/21   JW Reilly CNP   esomeprazole (NEXIUM) 20 MG delayed release capsule Take 1 capsule by mouth every morning (before breakfast) 6/14/21   JW Reilly CNP   magnesium oxide (MAG-OX) 400 MG tablet Take 1 tablet by mouth daily 6/14/21   JW Reilly CNP       Allergies:  Dye [iodides] and Aspirin    Social History:  The patient currently lives at home    TOBACCO:   reports that he has quit smoking. He has never used smokeless tobacco.  ETOH:   reports no history of alcohol use.       Family History:          Problem Relation Age of Onset    No Known Problems Mother     No Known Problems Father        PHYSICAL EXAM:    /76   Pulse 54   Temp 97.4 °F (36.3 °C) (Axillary)   Resp 16   Ht 5' 7\" (1.702 m)   Wt 170 lb (77.1 kg)   SpO2 100%   BMI 26.63 kg/m² General appearance: No apparent distress appears stated age and cooperative. HEENT Normal cephalic, atraumatic without obvious deformity. Neck: Supple, No jugular venous distention/bruits. Lungs: Clear to auscultation, bilaterally without rales/wheezes/rhonchi with good respiratory effort. Heart: Regular rate and rhythm with Normal S1/S2 without murmurs, rubs or gallops  Neurologic: pill rolling tremor  Mental status: Alert, oriented, thought content appropriate. CXR:  I have reviewed the CXR with the following interpretation: no acute abnormality  EKG:  I have reviewed the EKG with the following interpretation: sinus bradycardia, incomplete RBBB    CT head:   Mild central and cortical cerebral atrophy.       Mild chronic deep white matter ischemic changes       No acute intracranial abnormalities are noted. CT Cspine: Multilevel degenerate change.  No evidence fracture traumatic malalignment  CBC   Recent Labs     08/13/21 2136   WBC 5.3   HGB 13.7   HCT 40.2*         RENAL  Recent Labs     08/13/21 2136 08/14/21  0523   * 139   K 4.3 4.0    104   CO2 23 25   BUN 13 13   CREATININE 1.42* 1.34*     LFT'S  Recent Labs     08/13/21 2136 08/14/21  0523   AST 40* 36   * 118*   BILITOT 0.43 0.40   ALKPHOS 198* 172*     COAG  No results for input(s): INR in the last 72 hours. CARDIAC ENZYMES  No results for input(s): CKTOTAL, CKMB, CKMBINDEX, TROPONINI in the last 72 hours.     U/A:    Lab Results   Component Value Date    NITRITE neg 02/10/2015    COLORU YELLOW 08/13/2021    WBCUA None 06/28/2021    RBCUA 2 TO 5 06/28/2021    MUCUS NOT REPORTED 06/28/2021    BACTERIA NOT REPORTED 06/28/2021    CLARITYU Clear 11/07/2016    SPECGRAV 1.006 08/13/2021    LEUKOCYTESUR NEGATIVE 08/13/2021    BLOODU Positive 11/07/2016    GLUCOSEU NEGATIVE 08/13/2021    AMORPHOUS NOT REPORTED 06/28/2021       ABG  No results found for: CGM6FIM, BEART, L0UKJKIF, PHART, THGBART, XNC2TZJ, PO2ART, [address] Problems    Diagnosis Date Noted    General weakness [R53.1] 08/13/2021         ASSESSMENT/PLAN:    Generalized weakness - PT/OT, consult social work for placement    Parkinson's disease causing weakness      DVT Prophylaxis: enoxaparin  Diet: ADULT DIET;  Regular  Code Status: Full Code      Dispo - admitted      Mana Enamorado MD, FAAFP  8/15/2021, 8:36 AM

## 2021-08-15 NOTE — CARE COORDINATION
CASE MANAGEMENT NOTE:    Admission Date:  8/13/2021 Adebayo Ibrahim is a 79 y.o.  male    Admitted for : General weakness [R53.1]  Frequent falls [R29.6]  Fatigue, unspecified type [R53.83]    Met with:  Patient & Patient's Tae Quezada, Via phone, [phone]    PCP:  Antwon Medico:  [employer] [facility] Medicare      Is patient alert and oriented at time of discussion:  Yes    Current Residence/ Living Arrangements:  independently at home, Lives alone            Current Services PTA:  No    Does patient go to outpatient dialysis: No  If yes, location and chair time: NA    Is patient agreeable to VNS: No    Freedom of choice provided:  Yes    List of [address] provided: No    VNS chosen:  No, Denies the needs, However is recent readmit, from [facility] [address]. V's, has been falling at home, most likely needs SNF    DME:  straight cane and walker, SC    Home Oxygen: No    Nebulizer: No    CPAP/BIPAP: No    Supplier: N/A    Potential Assistance Needed: Yes    SNF needed: Yes    Freedom of choice and list provided: Per Bari Guan, she would like [id number] [facility] for Rehab, then try to get him into assisted living there. Will have LSW follow. Pharmacy:  [facility]       Does Patient want to use MEDS to BEDS? No    Is patient currently receiving oral anticoagulation therapy? No    Is the Patient an [facility] with Readmission Risk Score greater than 14%? Yes  If yes, pt needs a follow up appointment made within 7 days. Family Members/Caregivers that pt would like involved in their care:    Yes    If yes, list name here:  Son, Aislinn Zimmer, [address] [facility]    Transportation Provider:  Patient             Discharge Plan:  8/15/21 [employer] Medicare Pt. Recent DC from [facility] [address]. V's. Lives alone in 2 story home, Has been falling at home. DME walker, cane, SC. Per Tae Quezada, [facility], will possibility of getting into assisting living later. Referral sent. PT/OT on board. [employer] 15%, Will have LSW follow, Tod will need signed/completed.  Will follow//KB                 Electronically signed by: Kera Lugo RN on 8/15/2021 at 1:53 PM

## 2021-08-15 NOTE — FLOWSHEET NOTE
08/15/21 1538   Encounter Summary   Services provided to: Patient   Referral/Consult From: [facility]   (8/15/21V)   Volunteer Visit Yes   Complexity of Encounter Low   Length of Encounter 15 minutes   Spiritual/Yarsani   Type Spiritual support   Intervention Communion;Prayer   Sacraments   Communion Patient received communion

## 2021-08-16 PROBLEM — R53.83 FATIGUE: Status: ACTIVE | Noted: 2021-08-13

## 2021-08-16 PROBLEM — E66.3 OVERWEIGHT (BMI 25.0-29.9): Status: ACTIVE | Noted: 2021-08-16

## 2021-08-16 PROCEDURE — 6370000000 HC RX 637 (ALT 250 FOR IP): Performed by: FAMILY MEDICINE

## 2021-08-16 PROCEDURE — 97166 OT EVAL MOD COMPLEX 45 MIN: CPT

## 2021-08-16 PROCEDURE — 6360000002 HC RX W HCPCS: Performed by: FAMILY MEDICINE

## 2021-08-16 PROCEDURE — 97110 THERAPEUTIC EXERCISES: CPT

## 2021-08-16 PROCEDURE — G0378 HOSPITAL OBSERVATION PER HR: HCPCS

## 2021-08-16 PROCEDURE — 2580000003 HC RX 258: Performed by: FAMILY MEDICINE

## 2021-08-16 PROCEDURE — 99233 SBSQ HOSP IP/OBS HIGH 50: CPT | Performed by: FAMILY MEDICINE

## 2021-08-16 PROCEDURE — 96372 THER/PROPH/DIAG INJ SC/IM: CPT

## 2021-08-16 PROCEDURE — 97116 GAIT TRAINING THERAPY: CPT

## 2021-08-16 RX ADMIN — ACETAMINOPHEN 650 MG: 325 TABLET, FILM COATED ORAL at 10:11

## 2021-08-16 RX ADMIN — ENOXAPARIN SODIUM 40 MG: 40 INJECTION SUBCUTANEOUS at 07:35

## 2021-08-16 RX ADMIN — CARBIDOPA AND LEVODOPA 1 TABLET: 25; 100 TABLET ORAL at 12:13

## 2021-08-16 RX ADMIN — CARBIDOPA AND LEVODOPA 1 TABLET: 25; 100 TABLET ORAL at 19:37

## 2021-08-16 RX ADMIN — SODIUM CHLORIDE: 9 INJECTION, SOLUTION INTRAVENOUS at 05:19

## 2021-08-16 RX ADMIN — Medication 400 MG: at 07:34

## 2021-08-16 RX ADMIN — ACETAMINOPHEN 650 MG: 325 TABLET, FILM COATED ORAL at 00:37

## 2021-08-16 RX ADMIN — CARBIDOPA AND LEVODOPA 1 TABLET: 25; 100 TABLET ORAL at 17:46

## 2021-08-16 RX ADMIN — SODIUM CHLORIDE: 9 INJECTION, SOLUTION INTRAVENOUS at 19:39

## 2021-08-16 RX ADMIN — PANTOPRAZOLE SODIUM 40 MG: 40 TABLET, DELAYED RELEASE ORAL at 05:17

## 2021-08-16 RX ADMIN — METOPROLOL TARTRATE 12.5 MG: 25 TABLET, FILM COATED ORAL at 12:13

## 2021-08-16 RX ADMIN — METOPROLOL TARTRATE 12.5 MG: 25 TABLET, FILM COATED ORAL at 19:37

## 2021-08-16 RX ADMIN — ALLOPURINOL 100 MG: 100 TABLET ORAL at 07:34

## 2021-08-16 RX ADMIN — CLOPIDOGREL BISULFATE 75 MG: 75 TABLET ORAL at 07:35

## 2021-08-16 ASSESSMENT — PAIN SCALES - GENERAL
PAINLEVEL_OUTOF10: 8
PAINLEVEL_OUTOF10: 0
PAINLEVEL_OUTOF10: 2
PAINLEVEL_OUTOF10: 9
PAINLEVEL_OUTOF10: 3
PAINLEVEL_OUTOF10: 0

## 2021-08-16 ASSESSMENT — PAIN DESCRIPTION - LOCATION
LOCATION: HEAD
LOCATION_2: GENERALIZED

## 2021-08-16 ASSESSMENT — PAIN DESCRIPTION - INTENSITY: RATING_2: 8

## 2021-08-16 ASSESSMENT — PAIN DESCRIPTION - DESCRIPTORS: DESCRIPTORS: HEADACHE
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
## 2021-08-16 NOTE — DISCHARGE INSTR - COC
Continuity of Care Form    Patient Name: Julio C Avendano   :  1940  MRN:  [id number]***    Admit date:  2021  Discharge date: 21      Code Status Order: Full Code   Advance Directives:      Admitting Physician:  Viri Godinez MD  PCP: JW Dover CNP    Discharging Nurse: [facility] Unit/Room#: 2062/2062-01  Discharging Unit Phone Number: [phone]    Emergency Contact:   Extended Emergency Contact Information  Primary Emergency Contact: Jhony Gill  Address: [address] Phone: [phone]  Relation: Child  Secondary Emergency Contact: Dave Mccarthy  Address: [address] Phone: [phone]  Mobile Phone: [phone]  Relation: Other    Past Surgical History:  Past Surgical History:   Procedure Laterality Date    COLONOSCOPY  2012    poor prep, tubular adenoma    COLONOSCOPY  2017    diverticulosis, multiple polyps as described, spot marking done, pathology-tubular adenoma x 4    GA COLSC FLX W/RMVL OF TUMOR POLYP LESION SNARE TQ N/A 2017    COLONOSCOPY POLYPECTOMY SNARE/COLD BIOPSY with tatooing and apc transverse colon performed by Clarissa Melo MD at [address] [place] [address] Right     rotator cuff       Immunization History:   Immunization History   Administered Date(s) Administered    COVID-19, Pfizer, PF, 30mcg/0.3mL 2021, 2021    Influenza 2012    Influenza, High Dose (Fluzone 65 yrs and older) 2014, 2016, 2017, 2017    Influenza, Quadv, adjuvanted, 65 yrs +, IM, PF (Fluad) 10/19/2020    Influenza, Triv, inactivated, subunit, adjuvanted, IM (Fluad 65 yrs and older) 2018, 10/06/2019    Pneumococcal Conjugate 13-valent (Fermin Moran) 2017, 2018, 2018    Pneumococcal Polysaccharide ([id number]) 2012, 2015       Active Problems:  Patient Active Problem List   Diagnosis Code    Temporary loss of eyesight H53.129    Impaired renal function N28.9    Syncope : posterior circulation stroke vs Neurocardiogenic syncope  R55    Intracranial bleed : traumatic left sub-dural hematoma ,managed conservatively in 2011 I62.9    Headache R51.9    CKD (chronic kidney disease) stage 3, GFR 30-59 ml/min ([facility]) N18.30    Depression F32.9    Hyperlipidemia E78.5    Microhematuria R31.29    Microalbuminuria R80.9    Essential hypertension I10    Generalized abdominal pain R10.84    Tubular adenoma of colon D12.6    Diverticulosis of colon K57.30    History of skull fracture Z87.81    NSTEMI (non-ST elevated myocardial infarction) ([facility]) I21.4    Nausea R11.0    Pure hypercholesterolemia E78.00    Prediabetes R73.03    Parkinson disease ([facility]) G20    Chronic post-traumatic headache, not intractable G44.329    Fall (on) (from) other stairs and steps, initial encounter W10. 8XXA    Strain of iliopsoas muscle, initial encounter S76.919A    Acute pain of left knee M25.562    Closed fracture of second toe of right foot S92.501A    Closed fracture of second toe of left foot S92.502A    Abnormal ECG R94.31    Cerebrovascular accident ([facility] [address] I63.9    Chest pain, unspecified R07.9    Hematoma of scalp S00. 03XA    Left ventricular hypertrophy I51.7    Mild aortic valve regurgitation I35.1    Pulmonary hypertension, mild ([facility]) I27.20    Lumbar radiculopathy M54.16    Dizziness R42    Hyponatremia E87.1    Vomiting R11.10    General weakness R53.1    Overweight (BMI 25.0-29. 9) E66.3       Isolation/Infection:   Isolation            Contact          Patient Infection Status       Infection Onset Added Last Indicated Last Indicated By Review Planned Expiration Resolved Resolved By    MRSA  09/25/15 09/25/15 Hector Yang RN        Leg 9/23/15            Nurse Assessment:  Last Vital Signs: BP (!) 144/81   Pulse 59   Temp 97.3 °F (36.3 °C) (Axillary)   Resp 16   Ht 5' 7\" (1.702 m)   Wt 170 lb (77.1 kg)   SpO2 98%   BMI 26.63 kg/m²     Last documented pain score (0-10 scale): Pain Level: 0  Last Weight:   Wt Readings from Last 1 Encounters:   08/13/21 170 lb (77.1 kg)     Mental Status:  oriented and alert    IV Access:  - None    Nursing Mobility/ADLs:  Walking   Assisted  Transfer  Assisted  Bathing  Assisted  Dressing  Assisted  Toileting  Assisted  Feeding  Assisted  Med Admin  Assisted  Med Delivery   Whole    Wound Care Documentation and Therapy:        Elimination:  Continence:   · Bowel: Yes  · Bladder: Yes  Urinary Catheter: None   Colostomy/Ileostomy/Ileal Conduit: No         Intake/Output Summary (Last 24 hours) at 8/16/2021 0603  Last data filed at 8/16/2021 0047  Gross per 24 hour   Intake    Output 1100 ml   Net -1100 ml     I/O last 3 completed shifts:  In: -   Out: 1050 [Urine:1050]    Safety Concerns: At Risk for Falls    Impairments/Disabilities:      Vision    Nutrition Therapy:  Current Nutrition Therapy:   - Oral Diet:  General    Routes of Feeding: Oral  Liquids: No Restrictions  Daily Fluid Restriction: no  Last Modified Barium Swallow with Video (Video Swallowing Test): not done    Treatments at the Time of Hospital Discharge:   Respiratory Treatments: ***  Oxygen Therapy:  is not on home oxygen therapy.   Ventilator:    - No ventilator support    Rehab Therapies: Physical Therapy and Occupational Therapy  Weight Bearing Status/Restrictions: No weight bearing restirctions  Other Medical Equipment (for information only, NOT a DME order):  walker  Other Treatments: ***    Patient's personal belongings (please select all that are sent with patient):  Mendy    RN SIGNATURE:  Electronically signed by Alex Olvera RN on 8/17/21 at 10:27 AM EDT    CASE MANAGEMENT/SOCIAL WORK SECTION    Inpatient Status Date: ***    Readmission Risk Assessment Score:  Readmission Risk              Risk of Unplanned Readmission:  0           Discharging to Facility/ Agency   · Name: [facility]  · Address:

## 2021-08-16 NOTE — PROGRESS NOTES
SW spoke to Zabrina Irwin from [facility]. She is still reviewing referral and will know soon if pt will be accepted. Addendum: Pt will be accepted. Pre-cert started. Pt will need a rapid covid test prior to discharge. Charlie Soares

## 2021-08-16 NOTE — FLOWSHEET NOTE
08/16/21 1057   Encounter Summary   Services provided to: Patient   Referral/Consult From: Vivek   Continue Visiting   (8/16/21 V)   Volunteer Visit Yes   Complexity of Encounter Low   Length of Encounter 15 minutes   Spiritual/Hinduism   Type Spiritual support   Intervention Communion;Prayer   Sacraments   Communion Patient received communion

## 2021-08-16 NOTE — PROGRESS NOTES
hands  Extremities - peripheral pulses normal, no pedal edema, no clubbing or cyanosis  Skin - normal coloration and turgor, no rashes, no suspicious skin lesions noted    Data:   Medications:   Current Facility-Administered Medications   Medication Dose Route Frequency Provider Last Rate Last Admin    allopurinol (ZYLOPRIM) tablet 100 mg  100 mg Oral Daily Eloina Posada MD   100 mg at 08/15/21 0814    carbidopa-levodopa (SINEMET)  MG per tablet 1 tablet  1 tablet Oral 4x Daily Eloina Posada MD   1 tablet at 08/15/21 2012    clopidogrel (PLAVIX) tablet 75 mg  75 mg Oral Daily Eloina Posada MD   75 mg at 08/15/21 0815    pantoprazole (PROTONIX) tablet 40 mg  40 mg Oral QAM AC Eloina Posada MD   40 mg at 08/16/21 0517    magnesium oxide (MAG-OX) tablet 400 mg  400 mg Oral Daily Eloina Posada MD   400 mg at 08/15/21 0816    metoprolol tartrate (LOPRESSOR) tablet 12.5 mg  12.5 mg Oral BID Eloina Posada MD   12.5 mg at 08/15/21 2011    nitroGLYCERIN (NITROSTAT) SL tablet 0.4 mg  0.4 mg Sublingual Q5 Min PRN Eloina Posada MD        sodium chloride flush 0.9 % injection 5-40 mL  5-40 mL Intravenous 2 times per day Eloina Posada MD        sodium chloride flush 0.9 % injection 5-40 mL  5-40 mL Intravenous PRN Eloina Posada MD        0.9 % sodium chloride infusion  25 mL Intravenous PRN Eloina Posada MD        enoxaparin (LOVENOX) injection 40 mg  40 mg Subcutaneous Daily Eloina Posada MD   40 mg at 08/15/21 0816    ondansetron (ZOFRAN-ODT) disintegrating tablet 4 mg  4 mg Oral Q8H PRN Eliona Posada MD        Or    ondansetron [facility]) injection 4 mg  4 mg Intravenous Q6H PRN Eloina Posada MD        polyethylene glycol [facility]) packet 17 g  17 g Oral Daily PRN Eloina Posada MD        acetaminophen (TYLENOL) tablet 650 mg  650 mg Oral Q6H PRN Eloina Poasda MD   650 mg at 08/16/21 0037    Or    acetaminophen (TYLENOL) suppository 650 mg  650 mg Rectal Q6H PRN Leslye Smith MD        0.9 % sodium chloride infusion   Intravenous Continuous Leslye Smith MD 50 mL/hr at 08/16/21 0519 New Bag at 08/16/21 0519       Intake/Output Summary (Last 24 hours) at 8/16/2021 0603  Last data filed at 8/16/2021 0047  Gross per 24 hour   Intake    Output 1100 ml   Net -1100 ml     No results found for this or any previous visit (from the past 24 hour(s)).  -----------------------------------------------------------------  RAD:  EKG:  Micro:     Assessment & Plan:    Patient Active Problem List:     Temporary loss of eyesight     Impaired renal function     Syncope : posterior circulation stroke vs Neurocardiogenic syncope      Intracranial bleed : traumatic left sub-dural hematoma ,managed conservatively in 2011     Headache     CKD (chronic kidney disease) stage 3, GFR 30-59 ml/min ([facility])     Depression     Hyperlipidemia     Microhematuria     Microalbuminuria     Essential hypertension     Generalized abdominal pain     Tubular adenoma of colon     Diverticulosis of colon     History of skull fracture     NSTEMI (non-ST elevated myocardial infarction) ([facility])     Nausea     Pure hypercholesterolemia     Prediabetes     Parkinson disease ([facility])     Chronic post-traumatic headache, not intractable     Fall (on) (from) other stairs and steps, initial encounter     Strain of iliopsoas muscle, initial encounter     Acute pain of left knee     Closed fracture of second toe of right foot     Closed fracture of second toe of left foot     Abnormal ECG     Cerebrovascular accident ([facility] [address]     Chest pain, unspecified     Hematoma of scalp     Left ventricular hypertrophy     Mild aortic valve regurgitation     Pulmonary hypertension, mild ([facility])     Lumbar radiculopathy     Dizziness     Hyponatremia     Vomiting     General weakness     Overweight (BMI 25.0-29. 9)           Plan:  -Generalized weakness with frequent falls - PT/OT treating, recommendations reviewed. -Parkinson's disease - stable on Sinemet. -HTN - stable. -D/C plan is to SNF - awaiting pre-cert.  -Continue current treatments.  -Complete orders per chart.     See orders   Disposition:    Electronically signed by Veena Baez MD on 8/16/2021 at 6:03 AM

## 2021-08-16 NOTE — PLAN OF CARE
Problem: Falls - Risk of:  Goal: Will remain free from falls  Description: Will remain free from falls  8/16/2021 1525 by Cinthya Mai RN  Outcome: Met This Shift  Note: No falls noted this shift. Patient ambulates with x1 staff assistance without difficulty. Bed kept in low position. Safe environment maintained. Bedside table & call light in reach. Uses call light appropriately when needing assistance. 8/16/2021 0630 by Pankaj Babcock RN  Outcome: Ongoing  Note: Patient remains free of incidence/ injury. Bed remains in low position. Bed alarm on. Problem: Pain:  Goal: Pain level will decrease  Description: Pain level will decrease  Outcome: Ongoing  Note: Pt medicated with pain medication prn. Assessed all pain characteristics including level, type, location, frequency, and onset. Non-pharmacologic interventions offered to pt as well. Pt states pain is tolerable at this time. Will continue to monitor. Goal: Control of acute pain  Description: Control of acute pain  8/16/2021 0630 by Pankaj Babcock RN  Outcome: Ongoing  Note: Patient expresses relief following administration of prn pain medication.       Problem: Musculor/Skeletal Functional Status  Goal: Highest potential functional level  Outcome: Ongoing

## 2021-08-16 NOTE — PROGRESS NOTES
Physical Therapy  Facility/Department: [facility] MED SURG  Daily Treatment Note  NAME: Mari Fuchs  : 1940  MRN: [id number]    Date of Service: 2021    Discharge Recommendations:  Patient would benefit from continued therapy after discharge      Assessment   Treatment Diagnosis: Impaired fucntion  History: Parkinson's, Colon CA, OA.  REQUIRES PT FOLLOW UP: Yes  Activity Tolerance  Activity Tolerance: Patient limited by fatigue;Patient limited by endurance     Patient Diagnosis(es): The primary encounter diagnosis was Fatigue, unspecified type. A diagnosis of Frequent falls was also pertinent to this visit. has a past medical history of 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 ([facility] [address] CKD (chronic kidney disease) stage 2, GFR 60-89 ml/min, CKD (chronic kidney disease) stage 3, GFR 30-59 ml/min ([facility]), Diverticulosis of colon, GERD (gastroesophageal reflux disease), Impaired renal function, MRSA (methicillin resistant staph aureus) culture positive, Osteoarthritis of knee, Parkinson disease ([facility] [address] Proteinuria, Skull fracture ([facility] [address] Temporary loss of eyesight, and Tubular adenoma of colon. has a past surgical history that includes Colonoscopy (2012); shoulder surgery (Right); pr colsc flx w/rmvl of tumor polyp lesion snare tq (N/A, 2017); and Colonoscopy (2017). Restrictions  Restrictions/Precautions  Restrictions/Precautions: Fall Risk, Contact Precautions (Hx MRSA)  Required Braces or Orthoses?: No  Implants present? :  (Right shoulder surgery)  Position Activity Restriction  Other position/activity restrictions: Hx of parkinson's. Pt was recently at [facility], was recommended to go to SNF, but pt had declines at that time. Subjective   General  Chart Reviewed: Yes  Additional Pertinent Hx: The patient is a 79 y.o. male who presents to [facility] with increasing fatigue with lightheadedness that resulted in a fall on the day of admission.  Patient was admitted to [facility] recently for fatigue and failure to thrive and evaluation for SNF admission, but declined   Referring Practitioner: Dr Katina Toledo: Pt positioned semifowlers in bed, agreeable to tx  General Comment  Comments: MARCO ANTONIO Dhaliwal ok's tx   Pain Screening  Patient Currently in Pain: Yes  Pain Assessment  Pain Assessment: 0-10  Pain Level: 8  Pain Location: Head  Pain Descriptors: Headache  Pain 2  Pain Rating 2: 8  Pain Location 2: Generalized (\"Bones and joints\" )  Vital Signs  Patient Currently in Pain: Yes  Patient Observation  Observations: B UE tremors noted       Orientation  Orientation  Overall Orientation Status: Within Functional Limits  Objective   Bed mobility  Rolling to Left: Stand by assistance  Supine to Sit: Minimal assistance  Sit to Supine: Contact guard assistance  Scooting: Minimal assistance;Stand by assistance  Comment: Pt completes bed mobility with HOB elevated and use of bed rails, cuing provided throughout mobility. Pt states feeling \"goofy\" at  EOB. ~5 mins for \"gooziness\" to subside. Transfers  Sit to Stand: Contact guard assistance  Stand to sit: Contact guard assistance  Comment: Pt completes transfers w/ RW , verbal cuing required for safe handplacement. Ambulation  Ambulation?: Yes  Ambulation 1  Surface: level tile  Device: Rolling Walker  Assistance: Contact guard assistance  Quality of Gait: Short step, stooped posture, delayed initiation of steps, once got started able to maintain rythm. Gait Deviations: Shuffles;Decreased step height;Decreased step length; Slow Ewa  Distance: 126ft   Comments: Pt lacking TKE on L LE , Cues for improved postural awareness.    Stairs/Curb  Stairs?: No     Balance  Posture: Fair  Sitting - Static: Fair  Sitting - Dynamic: Fair;-  Standing - Static: Fair  Standing - Dynamic: Fair;-  Comments: Stanidng with Rolling walker  Other exercises  Other exercises 1: Seated B LE ex's at EOB x10     Goals  Short term goals  Time Frame for Short

## 2021-08-17 VITALS
OXYGEN SATURATION: 100 % | WEIGHT: 179.45 LBS | BODY MASS INDEX: 28.17 KG/M2 | SYSTOLIC BLOOD PRESSURE: 136 MMHG | HEIGHT: 67 IN | HEART RATE: 52 BPM | TEMPERATURE: 98.1 F | RESPIRATION RATE: 16 BRPM | DIASTOLIC BLOOD PRESSURE: 92 MMHG

## 2021-08-17 LAB
ANION GAP SERPL CALCULATED.3IONS-SCNC: 8 MMOL/L (ref 9–17)
BUN BLDV-MCNC: 13 MG/DL (ref 8–23)
BUN/CREAT BLD: ABNORMAL (ref 9–20)
CALCIUM SERPL-MCNC: 9.1 MG/DL (ref 8.6–10.4)
CHLORIDE BLD-SCNC: 102 MMOL/L (ref 98–107)
CO2: 25 MMOL/L (ref 20–31)
CREAT SERPL-MCNC: 1.1 MG/DL (ref 0.7–1.2)
GFR AFRICAN AMERICAN: >60 ML/MIN
GFR NON-AFRICAN AMERICAN: >60 ML/MIN
GFR SERPL CREATININE-BSD FRML MDRD: ABNORMAL ML/MIN/{1.73_M2}
GFR SERPL CREATININE-BSD FRML MDRD: ABNORMAL ML/MIN/{1.73_M2}
GLUCOSE BLD-MCNC: 102 MG/DL (ref 70–99)
HCT VFR BLD CALC: 43 % (ref 41–53)
HEMOGLOBIN: 14.4 G/DL (ref 13.5–17.5)
MCH RBC QN AUTO: 31.1 PG (ref 26–34)
MCHC RBC AUTO-ENTMCNC: 33.5 G/DL (ref 31–37)
MCV RBC AUTO: 92.8 FL (ref 80–100)
NRBC AUTOMATED: ABNORMAL PER 100 WBC
PDW BLD-RTO: 15.2 % (ref 11.5–14.9)
PLATELET # BLD: 206 K/UL (ref 150–450)
PMV BLD AUTO: 6.6 FL (ref 6–12)
POTASSIUM SERPL-SCNC: 4.3 MMOL/L (ref 3.7–5.3)
RBC # BLD: 4.63 M/UL (ref 4.5–5.9)
SARS-COV-2, RAPID: NOT DETECTED
SODIUM BLD-SCNC: 135 MMOL/L (ref 135–144)
SPECIMEN DESCRIPTION: NORMAL
WBC # BLD: 5.7 K/UL (ref 3.5–11)

## 2021-08-17 PROCEDURE — G0378 HOSPITAL OBSERVATION PER HR: HCPCS

## 2021-08-17 PROCEDURE — 6370000000 HC RX 637 (ALT 250 FOR IP): Performed by: FAMILY MEDICINE

## 2021-08-17 PROCEDURE — 97110 THERAPEUTIC EXERCISES: CPT

## 2021-08-17 PROCEDURE — 99239 HOSP IP/OBS DSCHRG MGMT >30: CPT | Performed by: FAMILY MEDICINE

## 2021-08-17 PROCEDURE — 36415 COLL VENOUS BLD VENIPUNCTURE: CPT

## 2021-08-17 PROCEDURE — 96372 THER/PROPH/DIAG INJ SC/IM: CPT

## 2021-08-17 PROCEDURE — 87635 SARS-COV-2 COVID-19 AMP PRB: CPT

## 2021-08-17 PROCEDURE — 6360000002 HC RX W HCPCS: Performed by: FAMILY MEDICINE

## 2021-08-17 PROCEDURE — 97116 GAIT TRAINING THERAPY: CPT

## 2021-08-17 PROCEDURE — 85027 COMPLETE CBC AUTOMATED: CPT

## 2021-08-17 PROCEDURE — 80048 BASIC METABOLIC PNL TOTAL CA: CPT

## 2021-08-17 RX ADMIN — ALLOPURINOL 100 MG: 100 TABLET ORAL at 08:22

## 2021-08-17 RX ADMIN — CARBIDOPA AND LEVODOPA 1 TABLET: 25; 100 TABLET ORAL at 10:19

## 2021-08-17 RX ADMIN — Medication 400 MG: at 08:22

## 2021-08-17 RX ADMIN — ENOXAPARIN SODIUM 40 MG: 40 INJECTION SUBCUTANEOUS at 08:23

## 2021-08-17 RX ADMIN — CLOPIDOGREL BISULFATE 75 MG: 75 TABLET ORAL at 08:22

## 2021-08-17 RX ADMIN — ACETAMINOPHEN 650 MG: 325 TABLET, FILM COATED ORAL at 04:06

## 2021-08-17 RX ADMIN — PANTOPRAZOLE SODIUM 40 MG: 40 TABLET, DELAYED RELEASE ORAL at 05:28

## 2021-08-17 ASSESSMENT — PAIN SCALES - GENERAL
PAINLEVEL_OUTOF10: 0
PAINLEVEL_OUTOF10: 8

## 2021-08-17 NOTE — PROGRESS NOTES
[address]   OCCUPATIONAL THERAPY MISSED TREATMENT NOTE   INPATIENT   Date: 21  Patient Name: Adebayo Ibrahim       Room: 2096/6311-66  MRN: [id number]   Account #: [de-identified]    : 1940  (79 y.o.)  Gender: male   Referring Practitioner: Dr. Teena Peralta  Diagnosis: General weakness, frequent falls              REASON FOR MISSED TREATMENT:  Pt is seated in chair and reports working with PT earlier this morning. Patient states \"Let me stay here and try to take a little nap to see if I feel better. \"   -   Patient politely declines. [phone]. MARCO ANTONIO Jolly notified.      Black Whitman, OT

## 2021-08-17 NOTE — PROGRESS NOTES
recently for fatigue and failure to thrive and evaluation for SNF admission, but declined   Referring Practitioner: Dr Rosa Galeana: Pt positioned semifowlers in bed, finishing breakfast and agreeable to tx. Pt states family is encouraging him into considering SNF once d/c. Pt continues wanting to return home and be w/ his two dogs. 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 Pt reaches for HHA and is cued on technique for completing bed mobiltiy without increased assist.  Transfers  Sit to Stand: Contact guard assistance  Stand to sit: Contact guard assistance  Comment: Pt completes transfers w/ RW . Cues required for safe handplacement  Ambulation  Ambulation?: Yes  Ambulation 1  Surface: level tile  Device: Rolling Walker  Assistance: Contact guard assistance  Quality of Gait: Short step, stooped posture  Gait Deviations: Shuffles;Decreased step height;Decreased step length; Slow Ewa  Distance: 120ft  Comments: Pt cued for upright posture and iincreasing step length, continued shuffling gait throughout ambulation.    Stairs/Curb  Stairs?: No     Balance  Posture: Fair  Sitting - Static: Fair  Sitting - Dynamic: Fair;-  Standing - Static: Fair  Standing - Dynamic: Fair;-  Comments: Stanidng with Rolling walker  Other exercises  Other exercises 1: Seated B LE ex's at EOB x10   Other exercises 2: toilet transfer  Other exercises 3: dynamic reaching seated/standing  ~5mins   Other exercises 4: STS x10     Goals  Short term goals  Time Frame for Short term goals: 5 to 6 days  Short term goal 1: Pt able to perform supine<.sit CGA  Short term goal 2:  Pt able to transfer SBA/CGA  Short term goal 3: Pt able to ambulate with rolling walker distance of 100 ft CGA  Short term goal 4: Pt cece to participate in strengthening/balance ex's for 20 to 30 minutes to improve fucntion. Patient Goals   Patient goals : Not stated    Plan    Plan  Times per week: 5 to 6x/week  Safety Devices  Type of devices: Gait belt, Call light within reach, Chair alarm in place, Left in chair, RN notified.    Therapy Time         08/17/21 1006   PT Individual Minutes   Time In 1711   Time Out 0924   Minutes [address]

## 2021-08-17 NOTE — PROGRESS NOTES
FAMILY MEDICINE  - PROGRESS NOTE    Date:  8/17/2021  [place]  [id number]      Chief Complaint   Patient presents with    Fall    Fatigue         Interval History:  not changed much, he reports feeling intermittent leg cramps and weak. No significant events overnight.        Subjective  Constitutional: positive for fatigue and overweight  Eyes: positive for contacts/glasses  Ears, nose, mouth, throat, and face: negative  Respiratory: positive for pulmonary HTN  Cardiovascular: negative  Gastrointestinal: positive for dyspepsia  Musculoskeletal:positive for arthralgias, muscle weakness and myalgias  Neurological: positive for coordination problems, gait problems and weakness  Behavioral/Psych: negative  Endocrine: positive for diabetic symptoms including hyperglycemia:    Objective:    /70   Pulse 63   Temp 98.2 °F (36.8 °C) (Oral)   Resp 16   Ht 5' 7\" (1.702 m)   Wt 179 lb 7.3 oz (81.4 kg)   SpO2 96%   BMI 28.11 kg/m²   General appearance - alert, well appearing, and in no distress and overweight  Mental status - alert, oriented to person, place, and time  Eyes - pupils equal and reactive, extraocular eye movements intact  Ears - hearing grossly normal bilaterally  Nose - normal and patent, no erythema, discharge or polyps  Mouth - mucous membranes moist, pharynx normal without lesions  Neck - supple, no significant adenopathy  Lymphatics - no palpable lymphadenopathy, no hepatosplenomegaly  Chest - clear to auscultation, no wheezes, rales or rhonchi, symmetric air entry, decreased air entry noted posteroiorly  Heart - normal rate, regular rhythm, normal S1, S2, no murmurs, rubs, clicks or gallops  Abdomen - soft, nontender, nondistended, no masses or organomegaly  Breasts - not examined  Back exam - not examined  Neurological - alert, oriented, normal speech, no focal findings or movement disorder noted  Musculoskeletal - osteoarthritic changes noted in both hands  Extremities - peripheral pulses normal, no pedal edema, no clubbing or cyanosis  Skin - normal coloration and turgor, no rashes, no suspicious skin lesions noted    Data:   Medications:   Current Facility-Administered Medications   Medication Dose Route Frequency Provider Last Rate Last Admin    allopurinol (ZYLOPRIM) tablet 100 mg  100 mg Oral Daily Janes Nino MD   100 mg at 08/16/21 0734    carbidopa-levodopa (SINEMET)  MG per tablet 1 tablet  1 tablet Oral 4x Daily Janes Nino MD   1 tablet at 08/16/21 1937    clopidogrel (PLAVIX) tablet 75 mg  75 mg Oral Daily Janes Nino MD   75 mg at 08/16/21 0735    pantoprazole (PROTONIX) tablet 40 mg  40 mg Oral QAM AC Janes Nino MD   40 mg at 08/17/21 0528    magnesium oxide (MAG-OX) tablet 400 mg  400 mg Oral Daily Janes Nino MD   400 mg at 08/16/21 0734    metoprolol tartrate (LOPRESSOR) tablet 12.5 mg  12.5 mg Oral BID Janes Nino MD   12.5 mg at 08/16/21 1937    nitroGLYCERIN (NITROSTAT) SL tablet 0.4 mg  0.4 mg Sublingual Q5 Min PRN Janes Nino MD        sodium chloride flush 0.9 % injection 5-40 mL  5-40 mL Intravenous 2 times per day Janes Nino MD        sodium chloride flush 0.9 % injection 5-40 mL  5-40 mL Intravenous PRN Janes Nino MD        0.9 % sodium chloride infusion  25 mL Intravenous PRN Janes Nino MD        enoxaparin (LOVENOX) injection 40 mg  40 mg Subcutaneous Daily Janes Nino MD   40 mg at 08/16/21 0735    ondansetron (ZOFRAN-ODT) disintegrating tablet 4 mg  4 mg Oral Q8H PRN Janes Nino MD        Or    [facility]ron [facility]) injection 4 mg  4 mg Intravenous Q6H PRN Janes Nino MD        polyethylene glycol [facility]) packet 17 g  17 g Oral Daily PRN Janes Nino MD        acetaminophen (TYLENOL) tablet 650 mg  650 mg Oral Q6H PRN Janes Nino MD   650 mg at 08/17/21 5129 Or    acetaminophen (TYLENOL) suppository 650 mg  650 mg Rectal Q6H PRN Leslye Smith MD        0.9 % sodium chloride infusion   Intravenous Continuous Leslye Smith MD 50 mL/hr at 08/1939 New Bag at 08/1939       Intake/Output Summary (Last 24 hours) at 8/17/2021 0707  Last data filed at 8/17/2021 0147  Gross per 24 hour   Intake 250 ml   Output 1475 ml   Net -1225 ml     Recent Results (from the past 24 hour(s))   CBC    Collection Time: 08/17/21  6:36 AM   Result Value Ref Range    WBC 5.7 3.5 - 11.0 k/uL    RBC 4.63 4.5 - 5.9 m/uL    Hemoglobin 14.4 13.5 - 17.5 g/dL    Hematocrit 43.0 41 - 53 %    MCV 92.8 80 - 100 fL    MCH 31.1 26 - 34 pg    MCHC 33.5 31 - 37 g/dL    RDW 15.2 (H) 11.5 - 14.9 %    Platelets 710 331 - 090 k/uL    MPV 6.6 6.0 - 12.0 fL    NRBC Automated NOT REPORTED per 100 WBC   Basic Metabolic Panel    Collection Time: 08/17/21  6:36 AM   Result Value Ref Range    Glucose 102 (H) 70 - 99 mg/dL    BUN 13 8 - 23 mg/dL    CREATININE 1.10 0.70 - 1.20 mg/dL    Bun/Cre Ratio NOT REPORTED 9 - 20    Calcium 9.1 8.6 - 10.4 mg/dL    Sodium 135 135 - 144 mmol/L    Potassium 4.3 3.7 - 5.3 mmol/L    Chloride 102 98 - 107 mmol/L    CO2 25 20 - 31 mmol/L    Anion Gap 8 (L) 9 - 17 mmol/L    GFR Non-African American >60 >60 mL/min    GFR African American >60 >60 mL/min    GFR Comment          GFR Staging NOT REPORTED      -----------------------------------------------------------------  RAD:  EKG:  Micro:     Assessment & Plan:    Patient Active Problem List:     Temporary loss of eyesight     Impaired renal function     Syncope : posterior circulation stroke vs Neurocardiogenic syncope      Intracranial bleed : traumatic left sub-dural hematoma ,managed conservatively in 2011     Headache     CKD (chronic kidney disease) stage 3, GFR 30-59 ml/min ([facility])     Depression     Hyperlipidemia     Microhematuria     Microalbuminuria     Essential hypertension     Generalized abdominal pain     Tubular adenoma of colon     Diverticulosis of colon     History of skull fracture     NSTEMI (non-ST elevated myocardial infarction) (HCC)     Nausea     Pure hypercholesterolemia     Prediabetes     Parkinson disease (HCC)     Chronic post-traumatic headache, not intractable     Fall (on) (from) other stairs and steps, initial encounter     Strain of iliopsoas muscle, initial encounter     Acute pain of left knee     Closed fracture of second toe of right foot     Closed fracture of second toe of left foot     Abnormal ECG     Cerebrovascular accident ([facility]     Chest pain, unspecified     Hematoma of scalp     Left ventricular hypertrophy     Mild aortic valve regurgitation     Pulmonary hypertension, mild (HCC)     Lumbar radiculopathy     Dizziness     Hyponatremia     Vomiting     General weakness     Overweight (BMI 25.0-29. 9)           Plan:  -Generalized weakness with frequent falls - PT/OT treating, recommendations reviewed. -Parkinson's disease - stable on Sinemet. -HTN - stable. -D/C plan is to SNF - pre-cert obtained.  -Okay to D/C to SNF. -Follow up in the office with Manolo Mora CNP, once discharged from SNF.  -Complete orders per chart.     See orders   Disposition:    Electronically signed by Patti Francis MD on 8/17/2021 at 7:07 AM

## 2021-08-17 NOTE — PROGRESS NOTES
Alexis Morales was able to obtain pre-cert. LUKAS informed pt could be discharged. Orders sent to facility. 7000 completed. Rapid covid is negative. Transport set for 1200. LUKAS left message for son Mi Ahuja.

## 2021-08-17 NOTE — PLAN OF CARE
Problem: Falls - Risk of:  Goal: Will remain free from falls  Description: Will remain free from falls  8/17/2021 0513 by Justo Brower RN  Outcome: Ongoing  Note: Patient remains free of incidence/ injury. Bed remains in low position. Up with walker and assist.      Problem: Pain:  Goal: Control of acute pain  Description: Control of acute pain  Outcome: Ongoing  Note: Patient rests peacefully following administration of prn pain medication.

## 2021-08-18 ENCOUNTER — CARE COORDINATION (OUTPATIENT)
Dept: CARE COORDINATION | Age: 81
End: 2021-08-18

## 2021-08-18 NOTE — CARE COORDINATION
Noted:  patient is being DC to SNF from hospital  Will check on DC plans in 3-4 weeks.    Girma Rushing RN, ambulatory care manager

## 2021-08-20 NOTE — DISCHARGE SUMMARY
[facility] Discharge Summary      Patient ID: Cecy Hanna    MRN: [id number]     Acct:  [de-identified]       Patient's PCP: JW Gregorio CNP    Admit Date: 8/13/2021     Discharge Date: 8/17/2021      Admitting Physician: Patti Francis MD    Discharge Physician: Patti Francis MD     Discharge Diagnoses:    Primary Problem  General weakness    Active Hospital Problems    Diagnosis Date Noted    Syncope : posterior circulation stroke vs Neurocardiogenic syncope  [R55] 07/09/2013     Priority: High    Overweight (BMI 25.0-29. 9) [E66.3] 08/16/2021    Frequent falls [R29.6]     General weakness [R53.1] 08/13/2021    Dizziness [R42] 08/09/2021    Left ventricular hypertrophy [I51.7] 10/01/2020    Parkinson disease ([address] Wynette Deeds 01/16/2020    Prediabetes [R73.03] 01/16/2020    Pure hypercholesterolemia [E78.00] 01/16/2020    Cerebrovascular accident ([address] [I63.9] 08/13/2018    Pulmonary hypertension, mild ([address] [I27.20] 08/31/2017    Essential hypertension [I10] 03/23/2015    Depression [F32.9] 04/07/2014    CKD (chronic kidney disease) stage 3, GFR 30-59 ml/min (HCC) [N18.30] 03/04/2014     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 ([address] 3/18/2011    d/t being hurt on the job    CKD (chronic kidney disease) stage 2, GFR 60-89 ml/min     CKD (chronic kidney disease) stage 3, GFR 30-59 ml/min (HCC)     Diverticulosis of colon     GERD (gastroesophageal reflux disease)     Impaired renal function     MRSA (methicillin resistant staph aureus) culture positive 9/23/2015    leg    Osteoarthritis of knee     Left    Parkinson disease ([address]     Proteinuria     Skull fracture ([address] 3/18/2011    d/t 12-foot drop on the job    Temporary loss of eyesight     periodically, happened x7    Tubular adenoma of colon 2012, 2017    [facility]     The patient was seen and examined on day of discharge and this discharge summary is in conjunction with any daily progress note from day of discharge. Code Status:  Prior    Hospital Course: uncomplicated    Consults:  none    Significant Diagnostic Studies: as above, and as follows: see chart    Treatments: as above    Disposition: SNF    Discharged Condition: Stable    Follow Up:  JW Hansen CNP in one week    Discharge Medications:    Vitaly [place]   Home Medication Instructions XOL:[id number]    Printed on:08/20/21 1286   Medication Information                      acetaminophen (TYLENOL 8 HOUR ARTHRITIS PAIN) 650 MG extended release tablet  Take 1 tablet by mouth every 8 hours as needed for Pain             allopurinol (ZYLOPRIM) 100 MG tablet  Take 1 tablet by mouth daily             atorvastatin (LIPITOR) 40 MG tablet  take 1 tablet by mouth once daily             butalbital-acetaminophen-caffeine (FIORICET, ESGIC) -40 MG per tablet  Take 1 tab prn only for severe headache. Can repeat after 4 hrs if needed. Max 2 tabs/24 hrs. Max 4 tabs/week             carbidopa-levodopa (SINEMET)  MG per tablet  Take 1 tablet by mouth 4 times daily             clopidogrel (PLAVIX) 75 MG tablet  Take 1 tablet by mouth daily             esomeprazole (NEXIUM) 20 MG delayed release capsule  Take 1 capsule by mouth every morning (before breakfast)             magnesium oxide (MAG-OX) 400 MG tablet  Take 1 tablet by mouth daily             metoprolol tartrate (LOPRESSOR) 25 MG tablet  Take 0.5 tablets by mouth 2 times daily             nitroGLYCERIN (NITROSTAT) 0.4 MG SL tablet  Place 1 tablet under the tongue every 5 minutes as needed for Chest pain If no relief of chest pain after 3rd dose, go to the ER or call 911                  Activity: activity as tolerated    Diet: cardiac diet    Time Spent on discharge is more than 35 minutes in the examination, evaluation, counseling and review of medications and discharge plan.       Electronically signed by Magi Pisano MD on 8/20/2021 at 9:16 AM

## 2021-08-25 ENCOUNTER — CARE COORDINATION (OUTPATIENT)
Dept: CARE COORDINATION | Age: 81
End: 2021-08-25

## 2021-08-25 NOTE — CARE COORDINATION
Patient contacted [facility] and stated he came home from SNF and now is so weak that he is considering going back to ED. ACM asked if he is staying hydrated and eating. He said he was in the SNF but now he is \"just trying\". Patient reports they gave him all his meals while he was there. He wanted to know if PCP would like to see him or if he should go back to ED? Will route to PCP for recommendations.

## 2021-08-26 ENCOUNTER — CARE COORDINATION (OUTPATIENT)
Dept: CARE COORDINATION | Age: 81
End: 2021-08-26

## 2021-08-26 ENCOUNTER — NURSE TRIAGE (OUTPATIENT)
Dept: OTHER | Facility: CLINIC | Age: 81
End: 2021-08-26

## 2021-08-26 NOTE — TELEPHONE ENCOUNTER
Reason for Disposition   Patient wants to be seen    Answer Assessment - Initial Assessment Questions  1. SYMPTOMS: \"What symptoms are you concerned about? \"      Patient reports \"heat in stomach\"- symptoms have not changed. Same symptoms from being admitted to hospital and rehab.    2. ONSET:  \"When did the symptoms start? \"      Couple of weeks ago    3. FEVER: \"Do you have a fever? \" If so, ask: \"What is it, how was it measured, and when did it start? \"       Denies    4. HEAT EXPOSURE: \"What caused you to become hot? \" (e.g., outside in the sun, inside a hot factory)      N/A    5. PHYSICAL ACTIVITY:  \"What type of physical activity were you doing? \" (e.g., working, sports)      N/A    6. SICK: \"Have you been sick recently? \" (e.g., cold, flu, recent fever)     Admitted to the hospital and home from rehab X2 days. 7. OTHER SYMPTOMS: \"Do you have any other symptoms? \" (e.g., fainting, flushed skin, weakness, nausea, vomiting, muscle cramps)     Feeling \"very weak\"- comes and goes. 8. PREGNANCY: \"Is there any chance you are pregnant? \" \"When was your last menstrual period? \"      N/A    Protocols used: HEAT EXPOSURE (HEAT EXHAUSTION AND HEAT STROKE)-ADULT-[place]    Received call from [address] at [facility] with [employer] Complaint. Brief description of triage: See above. Triage indicates for patient to make hospital f/u after being discharged from rehab facility. Patient reports current symptoms are the same as when in the hospital and at rehab. Patient knows to call back with new or worsening symptoms before appointment. Care advice provided, patient verbalizes understanding; denies any other questions or concerns; instructed to call back for any new or worsening symptoms. Writer provided warm transfer to Autumn at [facility] for appointment scheduling. Attention Provider: Thank you for allowing me to participate in the care of your patient.   The patient was connected to triage in response to

## 2021-08-27 ENCOUNTER — HOSPITAL ENCOUNTER (EMERGENCY)
Age: 81
Discharge: HOME | End: 2021-08-27
Attending: EMERGENCY MEDICINE
Payer: MEDICARE

## 2021-08-27 ENCOUNTER — APPOINTMENT (OUTPATIENT)
Dept: GENERAL RADIOLOGY | Age: 81
End: 2021-08-27
Payer: MEDICARE

## 2021-08-27 VITALS
TEMPERATURE: 97.9 F | OXYGEN SATURATION: 96 % | BODY MASS INDEX: 28.17 KG/M2 | RESPIRATION RATE: 19 BRPM | WEIGHT: 179.45 LBS | DIASTOLIC BLOOD PRESSURE: 89 MMHG | SYSTOLIC BLOOD PRESSURE: 110 MMHG | HEIGHT: 67 IN | HEART RATE: 55 BPM

## 2021-08-27 DIAGNOSIS — R53.1 GENERALIZED WEAKNESS: Primary | ICD-10-CM

## 2021-08-27 LAB
ABSOLUTE EOS #: 0.1 K/UL (ref 0–0.4)
ABSOLUTE IMMATURE GRANULOCYTE: ABNORMAL K/UL (ref 0–0.3)
ABSOLUTE LYMPH #: 1.3 K/UL (ref 1–4.8)
ABSOLUTE MONO #: 0.6 K/UL (ref 0.1–1.3)
ANION GAP SERPL CALCULATED.3IONS-SCNC: 11 MMOL/L (ref 9–17)
BASOPHILS # BLD: 1 % (ref 0–2)
BASOPHILS ABSOLUTE: 0 K/UL (ref 0–0.2)
BUN BLDV-MCNC: 17 MG/DL (ref 8–23)
BUN/CREAT BLD: ABNORMAL (ref 9–20)
CALCIUM SERPL-MCNC: 9.5 MG/DL (ref 8.6–10.4)
CHLORIDE BLD-SCNC: 100 MMOL/L (ref 98–107)
CO2: 24 MMOL/L (ref 20–31)
CREAT SERPL-MCNC: 1.23 MG/DL (ref 0.7–1.2)
DIFFERENTIAL TYPE: ABNORMAL
EOSINOPHILS RELATIVE PERCENT: 2 % (ref 0–4)
GFR AFRICAN AMERICAN: >60 ML/MIN
GFR NON-AFRICAN AMERICAN: 56 ML/MIN
GFR SERPL CREATININE-BSD FRML MDRD: ABNORMAL ML/MIN/{1.73_M2}
GFR SERPL CREATININE-BSD FRML MDRD: ABNORMAL ML/MIN/{1.73_M2}
GLUCOSE BLD-MCNC: 99 MG/DL (ref 70–99)
HCT VFR BLD CALC: 41.2 % (ref 41–53)
HEMOGLOBIN: 13.8 G/DL (ref 13.5–17.5)
IMMATURE GRANULOCYTES: ABNORMAL %
LYMPHOCYTES # BLD: 21 % (ref 24–44)
MCH RBC QN AUTO: 31.4 PG (ref 26–34)
MCHC RBC AUTO-ENTMCNC: 33.6 G/DL (ref 31–37)
MCV RBC AUTO: 93.3 FL (ref 80–100)
MONOCYTES # BLD: 10 % (ref 1–7)
MYOGLOBIN: 55 NG/ML (ref 28–72)
MYOGLOBIN: 62 NG/ML (ref 28–72)
NRBC AUTOMATED: ABNORMAL PER 100 WBC
PDW BLD-RTO: 15.4 % (ref 11.5–14.9)
PLATELET # BLD: 278 K/UL (ref 150–450)
PLATELET ESTIMATE: ABNORMAL
PMV BLD AUTO: 6.7 FL (ref 6–12)
POTASSIUM SERPL-SCNC: 4.3 MMOL/L (ref 3.7–5.3)
RBC # BLD: 4.41 M/UL (ref 4.5–5.9)
RBC # BLD: ABNORMAL 10*6/UL
SEG NEUTROPHILS: 66 % (ref 36–66)
SEGMENTED NEUTROPHILS ABSOLUTE COUNT: 4.2 K/UL (ref 1.3–9.1)
SODIUM BLD-SCNC: 135 MMOL/L (ref 135–144)
THYROXINE, FREE: 1.16 NG/DL (ref 0.93–1.7)
TROPONIN INTERP: ABNORMAL
TROPONIN INTERP: NORMAL
TROPONIN T: ABNORMAL NG/ML
TROPONIN T: NORMAL NG/ML
TROPONIN, HIGH SENSITIVITY: 20 NG/L (ref 0–22)
TROPONIN, HIGH SENSITIVITY: 23 NG/L (ref 0–22)
TSH SERPL DL<=0.05 MIU/L-ACNC: 3.29 MIU/L (ref 0.3–5)
WBC # BLD: 6.2 K/UL (ref 3.5–11)
WBC # BLD: ABNORMAL 10*3/UL

## 2021-08-27 PROCEDURE — 71045 X-RAY EXAM CHEST 1 VIEW: CPT

## 2021-08-27 PROCEDURE — 80048 BASIC METABOLIC PNL TOTAL CA: CPT

## 2021-08-27 PROCEDURE — 36415 COLL VENOUS BLD VENIPUNCTURE: CPT

## 2021-08-27 PROCEDURE — 84443 ASSAY THYROID STIM HORMONE: CPT

## 2021-08-27 PROCEDURE — 84439 ASSAY OF FREE THYROXINE: CPT

## 2021-08-27 PROCEDURE — 93005 ELECTROCARDIOGRAM TRACING: CPT | Performed by: EMERGENCY MEDICINE

## 2021-08-27 PROCEDURE — 84484 ASSAY OF TROPONIN QUANT: CPT

## 2021-08-27 PROCEDURE — 99285 EMERGENCY DEPT VISIT HI MDM: CPT

## 2021-08-27 PROCEDURE — 85025 COMPLETE CBC W/AUTO DIFF WBC: CPT

## 2021-08-27 PROCEDURE — 83874 ASSAY OF MYOGLOBIN: CPT

## 2021-08-27 ASSESSMENT — ENCOUNTER SYMPTOMS
ABDOMINAL PAIN: 0
TROUBLE SWALLOWING: 0
WHEEZING: 0
BLOOD IN STOOL: 0
EYE PAIN: 0
COLOR CHANGE: 0
EYE REDNESS: 0
RHINORRHEA: 0
SORE THROAT: 0
VOMITING: 0
NAUSEA: 0
CHEST TIGHTNESS: 0
CONSTIPATION: 0
SHORTNESS OF BREATH: 0
EYE DISCHARGE: 0
FACIAL SWELLING: 0
SINUS PRESSURE: 0
BACK PAIN: 0
COUGH: 0
DIARRHEA: 0

## 2021-08-27 ASSESSMENT — PAIN DESCRIPTION - ORIENTATION: ORIENTATION: OTHER (COMMENT)

## 2021-08-27 ASSESSMENT — PAIN DESCRIPTION - FREQUENCY: FREQUENCY: INTERMITTENT

## 2021-08-27 ASSESSMENT — PAIN - FUNCTIONAL ASSESSMENT: PAIN_FUNCTIONAL_ASSESSMENT: ACTIVITIES ARE NOT PREVENTED

## 2021-08-27 ASSESSMENT — PAIN DESCRIPTION - ONSET: ONSET: ON-GOING

## 2021-08-27 ASSESSMENT — PAIN DESCRIPTION - DESCRIPTORS: DESCRIPTORS: ACHING

## 2021-08-27 ASSESSMENT — PAIN DESCRIPTION - LOCATION: LOCATION: GENERALIZED

## 2021-08-27 ASSESSMENT — PAIN DESCRIPTION - PROGRESSION: CLINICAL_PROGRESSION: NOT CHANGED

## 2021-08-27 ASSESSMENT — PAIN SCALES - GENERAL: PAINLEVEL_OUTOF10: 6

## 2021-08-27 ASSESSMENT — PAIN DESCRIPTION - PAIN TYPE: TYPE: CHRONIC PAIN

## 2021-08-27 NOTE — ED PROVIDER NOTES
seizures, syncope, speech difficulty, weakness, numbness and headaches. Psychiatric/Behavioral: Negative for confusion, decreased concentration, hallucinations, self-injury, sleep disturbance and suicidal ideas.        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 ([facility] [address] 3/18/2011    d/t being hurt on the job    CKD (chronic kidney disease) stage 2, GFR 60-89 ml/min     CKD (chronic kidney disease) stage 3, GFR 30-59 ml/min (formerly [facility])     Diverticulosis of colon     GERD (gastroesophageal reflux disease)     Impaired renal function     MRSA (methicillin resistant staph aureus) culture positive 9/23/2015    leg    Osteoarthritis of knee     Left    Parkinson disease ([facility] [address]     Proteinuria     Skull fracture ([facility] [address] 3/18/2011    d/t 12-foot drop on the job    Temporary loss of eyesight     periodically, happened x7    Tubular adenoma of colon 2012, 2017    [facility]       SURGICAL HISTORY       Past Surgical History:   Procedure Laterality Date    COLONOSCOPY  11/02/2012    poor prep, tubular adenoma    COLONOSCOPY  09/19/2017    diverticulosis, multiple polyps as described, spot marking done, pathology-tubular adenoma x 4    SC COLSC FLX W/RMVL OF TUMOR POLYP LESION SNARE TQ N/A 9/19/2017    COLONOSCOPY POLYPECTOMY SNARE/COLD BIOPSY with tatooing and apc transverse colon performed by Kirsty Zamudio MD at [facility] Right     rotator cuff       CURRENT MEDICATIONS       Current Discharge Medication List      CONTINUE these medications which have NOT CHANGED    Details   metoprolol tartrate (LOPRESSOR) 25 MG tablet Take 0.5 tablets by mouth 2 times daily  Qty: 60 tablet, Refills: 3      nitroGLYCERIN (NITROSTAT) 0.4 MG SL tablet Place 1 tablet under the tongue every 5 minutes as needed for Chest pain If no relief of chest pain after 3rd dose, go to the ER or call 911  Qty: 25 tablet, Refills: 0    Associated Diagnoses: Coronary artery disease involving native coronary artery of native heart without angina pectoris      atorvastatin (LIPITOR) 40 MG tablet take 1 tablet by mouth once daily  Qty: 90 tablet, Refills: 3    Comments: Please use bubble packs  Associated Diagnoses: Pure hypercholesterolemia      allopurinol (ZYLOPRIM) 100 MG tablet Take 1 tablet by mouth daily  Qty: 90 tablet, Refills: 1    Comments: Please use bubble packs      acetaminophen (TYLENOL 8 HOUR ARTHRITIS PAIN) 650 MG extended release tablet Take 1 tablet by mouth every 8 hours as needed for Pain  Qty: 90 tablet, Refills: 5      butalbital-acetaminophen-caffeine (FIORICET, ESGIC) -40 MG per tablet Take 1 tab prn only for severe headache. Can repeat after 4 hrs if needed. Max 2 tabs/24 hrs. Max 4 tabs/week  Qty: 28 tablet, Refills: 3      carbidopa-levodopa (SINEMET)  MG per tablet Take 1 tablet by mouth 4 times daily  Qty: 360 tablet, Refills: 1    Comments: Please use bubble packs      clopidogrel (PLAVIX) 75 MG tablet Take 1 tablet by mouth daily  Qty: 90 tablet, Refills: 1    Comments: Please use bubble packs      esomeprazole (NEXIUM) 20 MG delayed release capsule Take 1 capsule by mouth every morning (before breakfast)  Qty: 90 capsule, Refills: 1    Comments: Please use bubble packs      magnesium oxide (MAG-OX) 400 MG tablet Take 1 tablet by mouth daily  Qty: 90 tablet, Refills: 1    Comments: Please use bubble packs             ALLERGIES     is allergic to dye [iodides] and aspirin. FAMILY HISTORY     [unfilled]     SOCIAL HISTORY      reports that he has quit smoking. He has never used smokeless tobacco. He reports that he does not drink alcohol and does not use drugs. PHYSICAL EXAM     INITIAL VITALS: /89   Pulse 55   Temp 97.9 °F (36.6 °C) (Oral)   Resp 19   Ht 5' 7\" (1.702 m)   Wt 179 lb 7.2 oz (81.4 kg)   SpO2 96%   BMI 28.11 kg/m²      Physical Exam  Vitals and nursing note reviewed.    Constitutional:       General: He is not in acute distress. Appearance: He is well-developed. He is not diaphoretic. HENT:      Head: Normocephalic and atraumatic. Eyes:      General: No scleral icterus. Right eye: No discharge. Left eye: No discharge. Conjunctiva/sclera: Conjunctivae normal.      Pupils: Pupils are equal, round, and reactive to light. Cardiovascular:      Rate and Rhythm: Normal rate and regular rhythm. Heart sounds: Normal heart sounds. No murmur heard. No friction rub. No gallop. Pulmonary:      Effort: Pulmonary effort is normal. No respiratory distress. Breath sounds: Normal breath sounds. No wheezing or rales. Chest:      Chest wall: No tenderness. Abdominal:      General: Bowel sounds are normal. There is no distension. Palpations: Abdomen is soft. There is no mass. Tenderness: There is no abdominal tenderness. There is no guarding or rebound. Musculoskeletal:         General: No tenderness. Normal range of motion. Skin:     General: Skin is warm and dry. Coloration: Skin is not pale. Findings: No erythema or rash. Neurological:      Mental Status: He is alert and oriented to person, place, and time. Cranial Nerves: No cranial nerve deficit. Sensory: No sensory deficit. Motor: No abnormal muscle tone. Coordination: Coordination normal.      Deep Tendon Reflexes: Reflexes normal.   Psychiatric:         Behavior: Behavior normal.         Thought Content: Thought content normal.         Judgment: Judgment normal.         MEDICAL DECISION MAKING:     Patient is having fatigue has been going on for months intermittently. Patient states that nothing seems to be making it better. Patient has had a recent work-up that really did not show much. I will repeat his work-up to make sure cardiac wise everything is okay and then will have to go from there.     DIAGNOSTIC RESULTS     EKG: All EKG's are interpreted by the Emergency Department Physician who either signs or Co-signs this chart in the absence of a cardiologist.    EKG Interpretation    Interpreted by emergency department physician    Rhythm: sinus bradycardia  Rate: bradycardia  Axis: normal  Ectopy: none  Conduction: right bundle branch block (incomplete)  ST Segments: nonspecific changes  T Waves: non specific changes  Q Waves: none    Clinical Impression: EKG: nonspecific ST and T waves changes, sinus bradycardia, incomplete RBBB. Kamlesh Estrada MD        RADIOLOGY:All plain film, CT, MRI, and formal ultrasound images (except ED bedside ultrasound)are read by the radiologist and interpretations are directly viewed by the emergency physician. XR CHEST PORTABLE    Result Date: 8/27/2021  EXAMINATION: ONE XRAY VIEW OF THE CHEST 8/27/2021 10:49 am COMPARISON: 13 August 2021 HISTORY: ORDERING SYSTEM PROVIDED HISTORY: Chest Pain TECHNOLOGIST PROVIDED HISTORY: Chest Pain Reason for Exam: middle chest pains Acuity: Acute Type of Exam: Initial FINDINGS: AP portable view of the chest time stamped at 1036 hours demonstrates overlying monitoring electrodes. Heart size is normal.  No vascular congestion, focal consolidation, effusion, or pneumothorax is noted. Osseous and mediastinal structures are age-appropriate. No acute cardiopulmonary process. LABS: All lab results were reviewed bymyself, and all abnormals are listed below.   Labs Reviewed   BASIC METABOLIC PANEL - Abnormal; Notable for the following components:       Result Value    CREATININE 1.23 (*)     GFR Non- 56 (*)     All other components within normal limits   CBC WITH AUTO DIFFERENTIAL - Abnormal; Notable for the following components:    RBC 4.41 (*)     RDW 15.4 (*)     Lymphocytes 21 (*)     Monocytes 10 (*)     All other components within normal limits   TROP/MYOGLOBIN - Abnormal; Notable for the following components:    Troponin, High Sensitivity 23 (*)     All other components within normal limits   TROP/MYOGLOBIN   TSH WITHOUT REFLEX   T4, FREE         EMERGENCY DEPARTMENT COURSE:   Vitals:    Vitals:    08/27/21 1113 08/27/21 1114 08/27/21 1139 08/27/21 1302   BP: (!) 138/124 114/69 110/62 110/89   Pulse: 52 51 50 55   Resp: 20 19 22 19   Temp:       TempSrc:       SpO2: 97% 96% 96%    Weight:       Height:           The patient was given the following medications while in the emergency department:   No orders of the defined types were placed in this encounter. -------------------------  1:39 PM EDT  Patient was updated on all the results. At this point time I do not see anything that would require him to be admitted and told him to follow back up with his doctor. CRITICAL CARE:   None    CONSULTS:  None    PROCEDURES:  None    FINAL IMPRESSION      1.  Generalized weakness          DISPOSITION/PLAN   DISPOSITION Decision To Discharge 08/27/2021 01:38:44 PM      PATIENT REFERRED TO:  JW Mckeon CNP  [address] [facility]  [address] [facility],[address]  [address] [facility] [id number] [phone]    In 3 days      [facility] ED  [facility] [address]  [address] [facility]  [phone]    If symptoms worsen      DISCHARGE MEDICATIONS:  Current Discharge Medication List          (Please note that portions of this note were completed with a voice recognition program.  Efforts were made to edit the dictations but occasionally words are mis-transcribed.)    Charlotte Clayton MD  Attending Rita Field MD  08/27/21 3608

## 2021-08-28 LAB
EKG ATRIAL RATE: 51 BPM
EKG P AXIS: 41 DEGREES
EKG P-R INTERVAL: 176 MS
EKG Q-T INTERVAL: 438 MS
EKG QRS DURATION: 106 MS
EKG QTC CALCULATION (BAZETT): 403 MS
EKG R AXIS: 34 DEGREES
EKG T AXIS: 24 DEGREES
EKG VENTRICULAR RATE: 51 BPM

## 2021-08-28 PROCEDURE — 93010 ELECTROCARDIOGRAM REPORT: CPT | Performed by: INTERNAL MEDICINE

## 2021-08-30 ENCOUNTER — CARE COORDINATION (OUTPATIENT)
Dept: CASE MANAGEMENT | Age: 81
End: 2021-08-30

## 2021-08-30 ENCOUNTER — CARE COORDINATION (OUTPATIENT)
Dept: CARE COORDINATION | Age: 81
End: 2021-08-30

## 2021-08-30 NOTE — CARE COORDINATION
Received request from Jaiden Estrada RN.,   Please schedule patient for ED f/u as soon as possible, patient was told Oct is first available. Called office was told Sept 8th @ 1pm, is first available appt. Called patient with time and date. Patient verbalized understanding.     Grayson Holloway, [address]  Care Coordination Transition

## 2021-08-30 NOTE — CARE COORDINATION
Ambulatory Care Coordination  ED Follow up Call    Reason for ED visit:  Generalized weakness   Status:     not changed    Did you call your PCP prior to going to the ED? No      Did you receive a discharge instructions from the Emergency Room? Yes  Review of Instructions:     Understands what to report/when to return?:  Yes   Understands discharge instructions?:  Yes   Following discharge instructions?:  Yes   If not why? Are there any new complaints of pain? No  New Pain Meds? No    Constipation prophylaxis needed? N/A    If you have a wound is the dressing clean, dry, and intact? N/A  Understands wound care regimen? N/A    Are there any other complaints/concerns that you wish to tell your provider? None    FU appts/Provider:    No future appointments. New Medications?:   No      Medication Reconciliation by phone - Yes  Understands Medications? Yes  Taking Medications? Yes  Can you swallow your pills? Yes    Any further needs in the home i.e. Equipment?   No    Link to services in community?:  No   Which services:

## 2021-09-01 ENCOUNTER — HOSPITAL ENCOUNTER (INPATIENT)
Age: 81
LOS: 6 days | Discharge: NURSING, REHAB OR LONG-TERM CARE | DRG: 310 | End: 2021-09-07
Attending: EMERGENCY MEDICINE | Admitting: FAMILY MEDICINE
Payer: MEDICARE

## 2021-09-01 ENCOUNTER — APPOINTMENT (OUTPATIENT)
Dept: GENERAL RADIOLOGY | Age: 81
DRG: 310 | End: 2021-09-01
Payer: MEDICARE

## 2021-09-01 DIAGNOSIS — R53.1 GENERAL WEAKNESS: Primary | ICD-10-CM

## 2021-09-01 DIAGNOSIS — R00.1 SYMPTOMATIC BRADYCARDIA: ICD-10-CM

## 2021-09-01 LAB
ABSOLUTE EOS #: 0.1 K/UL (ref 0–0.4)
ABSOLUTE IMMATURE GRANULOCYTE: ABNORMAL K/UL (ref 0–0.3)
ABSOLUTE LYMPH #: 1.3 K/UL (ref 1–4.8)
ABSOLUTE MONO #: 0.5 K/UL (ref 0.1–1.3)
ALBUMIN SERPL-MCNC: 4.2 G/DL (ref 3.5–5.2)
ALBUMIN/GLOBULIN RATIO: ABNORMAL (ref 1–2.5)
ALP BLD-CCNC: 119 U/L (ref 40–129)
ALT SERPL-CCNC: 29 U/L (ref 5–41)
ANION GAP SERPL CALCULATED.3IONS-SCNC: 9 MMOL/L (ref 9–17)
AST SERPL-CCNC: 22 U/L
BASOPHILS # BLD: 1 % (ref 0–2)
BASOPHILS ABSOLUTE: 0 K/UL (ref 0–0.2)
BILIRUB SERPL-MCNC: 0.3 MG/DL (ref 0.3–1.2)
BILIRUBIN URINE: NEGATIVE
BNP INTERPRETATION: NORMAL
BUN BLDV-MCNC: 13 MG/DL (ref 8–23)
BUN/CREAT BLD: ABNORMAL (ref 9–20)
C-REACTIVE PROTEIN: <3 MG/L (ref 0–5)
CALCIUM SERPL-MCNC: 9.6 MG/DL (ref 8.6–10.4)
CHLORIDE BLD-SCNC: 96 MMOL/L (ref 98–107)
CO2: 26 MMOL/L (ref 20–31)
COLOR: YELLOW
COMMENT UA: NORMAL
CREAT SERPL-MCNC: 1.36 MG/DL (ref 0.7–1.2)
DIFFERENTIAL TYPE: ABNORMAL
EOSINOPHILS RELATIVE PERCENT: 1 % (ref 0–4)
GFR AFRICAN AMERICAN: >60 ML/MIN
GFR NON-AFRICAN AMERICAN: 50 ML/MIN
GFR SERPL CREATININE-BSD FRML MDRD: ABNORMAL ML/MIN/{1.73_M2}
GFR SERPL CREATININE-BSD FRML MDRD: ABNORMAL ML/MIN/{1.73_M2}
GLUCOSE BLD-MCNC: 113 MG/DL (ref 70–99)
GLUCOSE URINE: NEGATIVE
HCT VFR BLD CALC: 42.5 % (ref 41–53)
HEMOGLOBIN: 14.5 G/DL (ref 13.5–17.5)
IMMATURE GRANULOCYTES: ABNORMAL %
KETONES, URINE: NEGATIVE
LACTATE DEHYDROGENASE: 156 U/L (ref 135–225)
LEUKOCYTE ESTERASE, URINE: NEGATIVE
LIPASE: 23 U/L (ref 13–60)
LYMPHOCYTES # BLD: 22 % (ref 24–44)
MAGNESIUM: 2.1 MG/DL (ref 1.6–2.6)
MCH RBC QN AUTO: 31.6 PG (ref 26–34)
MCHC RBC AUTO-ENTMCNC: 34.1 G/DL (ref 31–37)
MCV RBC AUTO: 92.9 FL (ref 80–100)
MONOCYTES # BLD: 9 % (ref 1–7)
NITRITE, URINE: NEGATIVE
NRBC AUTOMATED: ABNORMAL PER 100 WBC
PDW BLD-RTO: 15.7 % (ref 11.5–14.9)
PH UA: 7 (ref 5–8)
PLATELET # BLD: 252 K/UL (ref 150–450)
PLATELET ESTIMATE: ABNORMAL
PMV BLD AUTO: 6.8 FL (ref 6–12)
POTASSIUM SERPL-SCNC: 4.8 MMOL/L (ref 3.7–5.3)
PRO-BNP: 254 PG/ML
PROCALCITONIN: 0.08 NG/ML
PROTEIN UA: NEGATIVE
RBC # BLD: 4.58 M/UL (ref 4.5–5.9)
RBC # BLD: ABNORMAL 10*6/UL
SARS-COV-2, RAPID: NOT DETECTED
SEG NEUTROPHILS: 67 % (ref 36–66)
SEGMENTED NEUTROPHILS ABSOLUTE COUNT: 4 K/UL (ref 1.3–9.1)
SODIUM BLD-SCNC: 131 MMOL/L (ref 135–144)
SPECIFIC GRAVITY UA: 1.01 (ref 1–1.03)
SPECIMEN DESCRIPTION: NORMAL
TOTAL PROTEIN: 7.3 G/DL (ref 6.4–8.3)
TROPONIN INTERP: ABNORMAL
TROPONIN INTERP: ABNORMAL
TROPONIN T: ABNORMAL NG/ML
TROPONIN T: ABNORMAL NG/ML
TROPONIN, HIGH SENSITIVITY: 27 NG/L (ref 0–22)
TROPONIN, HIGH SENSITIVITY: 30 NG/L (ref 0–22)
TSH SERPL DL<=0.05 MIU/L-ACNC: 3.23 MIU/L (ref 0.3–5)
TURBIDITY: CLEAR
URINE HGB: NEGATIVE
UROBILINOGEN, URINE: NORMAL
WBC # BLD: 5.9 K/UL (ref 3.5–11)
WBC # BLD: ABNORMAL 10*3/UL

## 2021-09-01 PROCEDURE — 84145 PROCALCITONIN (PCT): CPT

## 2021-09-01 PROCEDURE — 93005 ELECTROCARDIOGRAM TRACING: CPT | Performed by: EMERGENCY MEDICINE

## 2021-09-01 PROCEDURE — 2060000000 HC ICU INTERMEDIATE R&B

## 2021-09-01 PROCEDURE — 2580000003 HC RX 258: Performed by: EMERGENCY MEDICINE

## 2021-09-01 PROCEDURE — 83735 ASSAY OF MAGNESIUM: CPT

## 2021-09-01 PROCEDURE — 96374 THER/PROPH/DIAG INJ IV PUSH: CPT

## 2021-09-01 PROCEDURE — 71045 X-RAY EXAM CHEST 1 VIEW: CPT

## 2021-09-01 PROCEDURE — 87635 SARS-COV-2 COVID-19 AMP PRB: CPT

## 2021-09-01 PROCEDURE — 81003 URINALYSIS AUTO W/O SCOPE: CPT

## 2021-09-01 PROCEDURE — 96361 HYDRATE IV INFUSION ADD-ON: CPT

## 2021-09-01 PROCEDURE — 80053 COMPREHEN METABOLIC PANEL: CPT

## 2021-09-01 PROCEDURE — 6370000000 HC RX 637 (ALT 250 FOR IP): Performed by: FAMILY MEDICINE

## 2021-09-01 PROCEDURE — 96372 THER/PROPH/DIAG INJ SC/IM: CPT

## 2021-09-01 PROCEDURE — 2580000003 HC RX 258: Performed by: FAMILY MEDICINE

## 2021-09-01 PROCEDURE — 84484 ASSAY OF TROPONIN QUANT: CPT

## 2021-09-01 PROCEDURE — 83880 ASSAY OF NATRIURETIC PEPTIDE: CPT

## 2021-09-01 PROCEDURE — 86140 C-REACTIVE PROTEIN: CPT

## 2021-09-01 PROCEDURE — 83615 LACTATE (LD) (LDH) ENZYME: CPT

## 2021-09-01 PROCEDURE — 84443 ASSAY THYROID STIM HORMONE: CPT

## 2021-09-01 PROCEDURE — 6370000000 HC RX 637 (ALT 250 FOR IP): Performed by: EMERGENCY MEDICINE

## 2021-09-01 PROCEDURE — 6360000002 HC RX W HCPCS: Performed by: FAMILY MEDICINE

## 2021-09-01 PROCEDURE — 36415 COLL VENOUS BLD VENIPUNCTURE: CPT

## 2021-09-01 PROCEDURE — 83690 ASSAY OF LIPASE: CPT

## 2021-09-01 PROCEDURE — 85025 COMPLETE CBC W/AUTO DIFF WBC: CPT

## 2021-09-01 PROCEDURE — 99284 EMERGENCY DEPT VISIT MOD MDM: CPT

## 2021-09-01 PROCEDURE — 2500000003 HC RX 250 WO HCPCS: Performed by: INTERNAL MEDICINE

## 2021-09-01 PROCEDURE — 6360000002 HC RX W HCPCS: Performed by: EMERGENCY MEDICINE

## 2021-09-01 RX ORDER — BUTALBITAL, ACETAMINOPHEN AND CAFFEINE 300; 40; 50 MG/1; MG/1; MG/1
1 CAPSULE ORAL EVERY 6 HOURS PRN
Status: DISCONTINUED | OUTPATIENT
Start: 2021-09-01 | End: 2021-09-07 | Stop reason: HOSPADM

## 2021-09-01 RX ORDER — CLOPIDOGREL BISULFATE 75 MG/1
75 TABLET ORAL DAILY
Status: DISCONTINUED | OUTPATIENT
Start: 2021-09-01 | End: 2021-09-07 | Stop reason: HOSPADM

## 2021-09-01 RX ORDER — ROPINIROLE 0.5 MG/1
0.25 TABLET, FILM COATED ORAL 3 TIMES DAILY
Status: DISCONTINUED | OUTPATIENT
Start: 2021-09-01 | End: 2021-09-07 | Stop reason: HOSPADM

## 2021-09-01 RX ORDER — MAGNESIUM HYDROXIDE/ALUMINUM HYDROXICE/SIMETHICONE 120; 1200; 1200 MG/30ML; MG/30ML; MG/30ML
30 SUSPENSION ORAL ONCE
Status: COMPLETED | OUTPATIENT
Start: 2021-09-01 | End: 2021-09-01

## 2021-09-01 RX ORDER — PANTOPRAZOLE SODIUM 40 MG/1
40 TABLET, DELAYED RELEASE ORAL
Refills: 1 | Status: DISCONTINUED | OUTPATIENT
Start: 2021-09-02 | End: 2021-09-07 | Stop reason: HOSPADM

## 2021-09-01 RX ORDER — SODIUM CHLORIDE 0.9 % (FLUSH) 0.9 %
5-40 SYRINGE (ML) INJECTION EVERY 12 HOURS SCHEDULED
Status: DISCONTINUED | OUTPATIENT
Start: 2021-09-01 | End: 2021-09-07 | Stop reason: HOSPADM

## 2021-09-01 RX ORDER — ATORVASTATIN CALCIUM 40 MG/1
40 TABLET, FILM COATED ORAL DAILY
Status: DISCONTINUED | OUTPATIENT
Start: 2021-09-01 | End: 2021-09-07 | Stop reason: HOSPADM

## 2021-09-01 RX ORDER — ACETAMINOPHEN 650 MG/1
650 SUPPOSITORY RECTAL EVERY 6 HOURS PRN
Status: DISCONTINUED | OUTPATIENT
Start: 2021-09-01 | End: 2021-09-07 | Stop reason: HOSPADM

## 2021-09-01 RX ORDER — MAGNESIUM OXIDE 400 MG/1
400 TABLET ORAL DAILY
Status: DISCONTINUED | OUTPATIENT
Start: 2021-09-01 | End: 2021-09-01 | Stop reason: RX

## 2021-09-01 RX ORDER — 0.9 % SODIUM CHLORIDE 0.9 %
500 INTRAVENOUS SOLUTION INTRAVENOUS ONCE
Status: COMPLETED | OUTPATIENT
Start: 2021-09-01 | End: 2021-09-01

## 2021-09-01 RX ORDER — ONDANSETRON 4 MG/1
4 TABLET, ORALLY DISINTEGRATING ORAL EVERY 8 HOURS PRN
Status: DISCONTINUED | OUTPATIENT
Start: 2021-09-01 | End: 2021-09-07 | Stop reason: HOSPADM

## 2021-09-01 RX ORDER — POLYETHYLENE GLYCOL 3350 17 G/17G
17 POWDER, FOR SOLUTION ORAL DAILY PRN
Status: DISCONTINUED | OUTPATIENT
Start: 2021-09-01 | End: 2021-09-07 | Stop reason: HOSPADM

## 2021-09-01 RX ORDER — ROPINIROLE 0.25 MG/1
0.25 TABLET, FILM COATED ORAL 3 TIMES DAILY
COMMUNITY
End: 2021-10-11

## 2021-09-01 RX ORDER — ACETAMINOPHEN 325 MG/1
650 TABLET ORAL EVERY 6 HOURS PRN
Status: DISCONTINUED | OUTPATIENT
Start: 2021-09-01 | End: 2021-09-07 | Stop reason: HOSPADM

## 2021-09-01 RX ORDER — ONDANSETRON 2 MG/ML
4 INJECTION INTRAMUSCULAR; INTRAVENOUS ONCE
Status: COMPLETED | OUTPATIENT
Start: 2021-09-01 | End: 2021-09-01

## 2021-09-01 RX ORDER — ALLOPURINOL 100 MG/1
100 TABLET ORAL DAILY
Status: DISCONTINUED | OUTPATIENT
Start: 2021-09-01 | End: 2021-09-07 | Stop reason: HOSPADM

## 2021-09-01 RX ORDER — SODIUM CHLORIDE 9 MG/ML
25 INJECTION, SOLUTION INTRAVENOUS PRN
Status: DISCONTINUED | OUTPATIENT
Start: 2021-09-01 | End: 2021-09-07 | Stop reason: HOSPADM

## 2021-09-01 RX ORDER — ACETAMINOPHEN 325 MG/1
650 TABLET ORAL EVERY 8 HOURS PRN
Status: DISCONTINUED | OUTPATIENT
Start: 2021-09-01 | End: 2021-09-02

## 2021-09-01 RX ORDER — ONDANSETRON 2 MG/ML
4 INJECTION INTRAMUSCULAR; INTRAVENOUS EVERY 6 HOURS PRN
Status: DISCONTINUED | OUTPATIENT
Start: 2021-09-01 | End: 2021-09-07 | Stop reason: HOSPADM

## 2021-09-01 RX ORDER — NITROGLYCERIN 0.4 MG/1
0.4 TABLET SUBLINGUAL EVERY 5 MIN PRN
Status: DISCONTINUED | OUTPATIENT
Start: 2021-09-01 | End: 2021-09-07 | Stop reason: HOSPADM

## 2021-09-01 RX ORDER — SODIUM CHLORIDE 0.9 % (FLUSH) 0.9 %
5-40 SYRINGE (ML) INJECTION PRN
Status: DISCONTINUED | OUTPATIENT
Start: 2021-09-01 | End: 2021-09-07 | Stop reason: HOSPADM

## 2021-09-01 RX ADMIN — GLUCAGON HYDROCHLORIDE 1 MG: KIT at 16:03

## 2021-09-01 RX ADMIN — SODIUM CHLORIDE 500 ML: 9 INJECTION, SOLUTION INTRAVENOUS at 11:49

## 2021-09-01 RX ADMIN — CLOPIDOGREL BISULFATE 75 MG: 75 TABLET ORAL at 17:50

## 2021-09-01 RX ADMIN — ROPINIROLE HYDROCHLORIDE 0.25 MG: 0.5 TABLET, FILM COATED ORAL at 20:35

## 2021-09-01 RX ADMIN — ROPINIROLE HYDROCHLORIDE 0.25 MG: 0.5 TABLET, FILM COATED ORAL at 17:50

## 2021-09-01 RX ADMIN — ALUMINUM HYDROXIDE, MAGNESIUM HYDROXIDE, AND SIMETHICONE 30 ML: 200; 200; 20 SUSPENSION ORAL at 11:50

## 2021-09-01 RX ADMIN — ALLOPURINOL 100 MG: 100 TABLET ORAL at 17:50

## 2021-09-01 RX ADMIN — ONDANSETRON 4 MG: 2 INJECTION INTRAMUSCULAR; INTRAVENOUS at 11:50

## 2021-09-01 RX ADMIN — ENOXAPARIN SODIUM 40 MG: 40 INJECTION SUBCUTANEOUS at 16:03

## 2021-09-01 RX ADMIN — MAGNESIUM OXIDE TAB 400 MG (241.3 MG ELEMENTAL MG) 400 MG: 400 (241.3 MG) TAB at 17:50

## 2021-09-01 RX ADMIN — SODIUM CHLORIDE, PRESERVATIVE FREE 10 ML: 5 INJECTION INTRAVENOUS at 20:37

## 2021-09-01 RX ADMIN — ATORVASTATIN CALCIUM 40 MG: 40 TABLET, FILM COATED ORAL at 17:50

## 2021-09-01 RX ADMIN — CARBIDOPA AND LEVODOPA 1 TABLET: 25; 100 TABLET ORAL at 20:36

## 2021-09-01 ASSESSMENT — ENCOUNTER SYMPTOMS
NAUSEA: 0
COLOR CHANGE: 0
DIARRHEA: 0
BLOOD IN STOOL: 0
SHORTNESS OF BREATH: 1
BACK PAIN: 0
COUGH: 0
VOMITING: 0
SORE THROAT: 0
TROUBLE SWALLOWING: 0
CONSTIPATION: 0
ABDOMINAL PAIN: 1

## 2021-09-01 ASSESSMENT — PAIN SCALES - GENERAL: PAINLEVEL_OUTOF10: 8

## 2021-09-01 NOTE — ED PROVIDER NOTES
[address]  EMERGENCY DEPARTMENT ENCOUNTER    Pt Name: Jelly Ortiz  MRN: [id number]  YOB: 1940  Date of evaluation:9/1/21  PCP: Vicente Blair, JW - [address]       Chief Complaint   Patient presents with    Nausea    Fatigue    Generalized Body Aches       HISTORY OF PRESENT ILLNESS    Jelly Ortiz is a 79 y.o. male who presents with a chief complaint of generalized fatigue. Patient states he is been sick for about 2 weeks. He states he feels intermittently very fatigued, low energy, generalized weakness. He states he is also developed nausea and a burning sensation in his upper abdomen that radiates up into his chest.  He states he also woke up last night with difficulty breathing. He states he aches all over. No fevers at home. No changes in bowel or bladder habits. No loss of taste or smell. He was vaccinated for COVID-19. He was here to the emergency department about a week ago with similar symptoms and discharged. He states he never really felt any better after that. Symptoms are acute. Symptoms are moderate. Nothing else make symptoms better or worse. Patient has no other complaints at this time. REVIEW OF SYSTEMS       Review of Systems   Constitutional: Positive for appetite change and fatigue. Negative for chills and fever. HENT: Negative for congestion, ear pain, sore throat and trouble swallowing. Eyes: Negative for visual disturbance. Respiratory: Positive for shortness of breath. Negative for cough. Cardiovascular: Positive for chest pain. Negative for palpitations and leg swelling. Gastrointestinal: Positive for abdominal pain. Negative for blood in stool, constipation, diarrhea, nausea and vomiting. Genitourinary: Negative for dysuria and flank pain. Musculoskeletal: Positive for arthralgias and myalgias. Negative for back pain and neck pain. Skin: Negative for color change, rash and wound.    Neurological: Positive for tremors. Negative for dizziness, weakness, light-headedness, numbness and headaches. Psychiatric/Behavioral: Negative for confusion. All other systems reviewed and are negative. Negative in 10 essential Systems except as mentioned above and in the HPI. 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 ([facility] [address] 3/18/2011    d/t being hurt on the job    CKD (chronic kidney disease) stage 2, GFR 60-89 ml/min     CKD (chronic kidney disease) stage 3, GFR 30-59 ml/min ([facility]     Diverticulosis of colon     GERD (gastroesophageal reflux disease)     Impaired renal function     MRSA (methicillin resistant staph aureus) culture positive 9/23/2015    leg    Osteoarthritis of knee     Left    Parkinson disease ([facility] [address]     Proteinuria     Skull fracture ([facility] 3/18/2011    d/t 12-foot drop on the job    Temporary loss of eyesight     periodically, happened x7    Tubular adenoma of colon 2012, 2017    [facility]         SURGICAL HISTORY      has a past surgical history that includes Colonoscopy (11/02/2012); shoulder surgery (Right); pr colsc flx w/rmvl of tumor polyp lesion snare tq (N/A, 9/19/2017); and Colonoscopy (09/19/2017). CURRENT MEDICATIONS       Previous Medications    ACETAMINOPHEN (TYLENOL 8 HOUR ARTHRITIS PAIN) 650 MG EXTENDED RELEASE TABLET    Take 1 tablet by mouth every 8 hours as needed for Pain    ALLOPURINOL (ZYLOPRIM) 100 MG TABLET    Take 1 tablet by mouth daily    ATORVASTATIN (LIPITOR) 40 MG TABLET    take 1 tablet by mouth once daily    BUTALBITAL-ACETAMINOPHEN-CAFFEINE (FIORICET, ESGIC) -40 MG PER TABLET    Take 1 tab prn only for severe headache. Can repeat after 4 hrs if needed. Max 2 tabs/24 hrs.  Max 4 tabs/week    CARBIDOPA-LEVODOPA (SINEMET)  MG PER TABLET    Take 1 tablet by mouth 4 times daily    CLOPIDOGREL (PLAVIX) 75 MG TABLET    Take 1 tablet by mouth daily    ESOMEPRAZOLE (NEXIUM) 20 MG DELAYED RELEASE CAPSULE    Take 1 capsule by mouth every morning (before breakfast)    MAGNESIUM OXIDE (MAG-OX) 400 MG TABLET    Take 1 tablet by mouth daily    METOPROLOL TARTRATE (LOPRESSOR) 25 MG TABLET    Take 0.5 tablets by mouth 2 times daily    NITROGLYCERIN (NITROSTAT) 0.4 MG SL TABLET    Place 1 tablet under the tongue every 5 minutes as needed for Chest pain If no relief of chest pain after 3rd dose, go to the ER or call 911       ALLERGIES     is allergic to dye [iodides] and aspirin. FAMILY HISTORY     He indicated that the status of his mother is unknown. He indicated that the status of his father is unknown.     family history includes No Known Problems in his father and mother. SOCIAL HISTORY      reports that he has quit smoking. He has never used smokeless tobacco. He reports that he does not drink alcohol and does not use drugs. PHYSICAL EXAM     INITIAL VITALS:  oral temperature is 97.9 °F (36.6 °C). His blood pressure is 146/75 (abnormal) and his pulse is 52. His respiration is 18 and oxygen saturation is 100%. Physical Exam  Vitals and nursing note reviewed. Constitutional:       General: He is not in acute distress. HENT:      Head: Normocephalic and atraumatic. Eyes:      Conjunctiva/sclera: Conjunctivae normal.      Pupils: Pupils are equal, round, and reactive to light. Cardiovascular:      Rate and Rhythm: Normal rate and regular rhythm. Heart sounds: Normal heart sounds. No murmur heard. Pulmonary:      Effort: Pulmonary effort is normal. No respiratory distress. Breath sounds: Normal breath sounds. Abdominal:      General: Bowel sounds are normal. There is no distension. Palpations: Abdomen is soft. Tenderness: There is no abdominal tenderness. Musculoskeletal:         General: No tenderness. Cervical back: Neck supple. Lymphadenopathy:      Cervical: No cervical adenopathy. Skin:     General: Skin is warm and dry.       Findings: No rash.   Neurological:      Mental Status: He is alert and oriented to person, place, and time. Sensory: No sensory deficit. Motor: Tremor present. No weakness. Psychiatric:         Judgment: Judgment normal.           DIFFERENTIAL DIAGNOSIS/MDM:   79-year-old male presents with 2 weeks of generalized fatigue, generalized weakness, nausea and low energy. He is afebrile, nontoxic, mildly bradycardic otherwise vital signs normal..  Not any acute distress. Differential diagnosis is broad includes dehydration, metabolic abnormality, viral syndrome, COVID-19, pneumonia, GERD. Low suspicion for ACS however he does complain of a burning sensation does have cardiac risk factors. We'll get a cardiac work-up. We'll check electrolytes, give IV fluids, antiemetics, antacids. He was vaccinated for COVID-19 but with how long his symptoms have been lasting I'm going to test him for COVID-19. DIAGNOSTIC RESULTS     EKG: All EKG's are interpreted by the Emergency Department Physician who either signs or Co-signs this chart in the absence of a cardiologist.    EKG Interpretation    Interpreted by me    Rhythm: Sinus bradycardia  Rate: 49  Axis: normal  Ectopy: none  Conduction: Incomplete right bundle branch block  ST Segments: no acute change  T Waves: no acute change  Q Waves: none    Clinical Impression: Nonspecific EKG, sinus bradycardia    RADIOLOGY:   I directly visualized the following  images and reviewed the radiologist interpretations:  XR CHEST PORTABLE   Final Result   Unchanged appearance of the chest without acute airspace disease identified.                  ED BEDSIDE ULTRASOUND:      LABS:  Labs Reviewed   CBC WITH AUTO DIFFERENTIAL - Abnormal; Notable for the following components:       Result Value    RDW 15.7 (*)     Seg Neutrophils 67 (*)     Lymphocytes 22 (*)     Monocytes 9 (*)     All other components within normal limits   COMPREHENSIVE METABOLIC PANEL - Abnormal; Notable for the following components:    Glucose 113 (*)     CREATININE 1.36 (*)     Sodium 131 (*)     Chloride 96 (*)     GFR Non- 50 (*)     All other components within normal limits   TROPONIN - Abnormal; Notable for the following components:    Troponin, High Sensitivity 30 (*)     All other components within normal limits   COVID-19, RAPID   LIPASE   LACTATE DEHYDROGENASE   C-REACTIVE PROTEIN   PROCALCITONIN   TSH WITH REFLEX   URINE RT REFLEX TO CULTURE   MAGNESIUM   BRAIN NATRIURETIC PEPTIDE   TROPONIN         EMERGENCY DEPARTMENT COURSE:   Vitals:    Vitals:    09/01/21 1114 09/01/21 1127 09/01/21 1250   BP: (!) 110/58 100/70 (!) 146/75   Pulse: 52 143 52   Resp: 17 14 18   Temp: 97.5 °F (36.4 °C) 97.9 °F (36.6 °C)    TempSrc: Oral Oral    SpO2: 97% 98% 100%     11:31 AM EDT  Updated by RN that patient had a episode of elevated heart rate to the 140s that resolved spontaneously and is now in the 50s again. 12:04 PM EDT  EKG shows sinus bradycardia with a rate of 49. No evidence of high-grade heart block. Concerned the patient could have sick sinus syndrome with his alternating tachycardia, bradycardia however patient is also on rate suppressing medication Lopressor. Patient does not have a cardiologist that he is following with.      1:15 PM EDT  Spoke with Dr. Mark Pope who accepted admission. She did asked that we place a consult to Dr. Renata taylor. Patient updated about plan. CRITICALCARE:      CONSULTS:  IP CONSULT TO PRIMARY CARE PROVIDER  IP CONSULT TO CARDIOLOGY      PROCEDURES:      FINAL IMPRESSION      1. General weakness    2. Symptomatic bradycardia            DISPOSITION/PLAN   DISPOSITION Admitted 09/01/2021 01:14:56 PM          PATIENT REFERRED TO:  No follow-up provider specified.     DISCHARGE MEDICATIONS:  New Prescriptions    No medications on file       (Please note that portions ofthis note were completed with a voice recognition program.  Efforts were made to edit the dictations but occasionally words are mis-transcribed.)    Foster Quiroz DO  Attending Emergency Physician          Foster Quiroz DO  09/01/21 3416

## 2021-09-01 NOTE — PROGRESS NOTES
Medication History completed:    No changes to medication list at this encounter. Medications confirmed with [facility]. The patient has not refilled most of his medications since May 2021.      Thank you,  James Erazo, PharmD, BCPS  [phone]

## 2021-09-01 NOTE — PROGRESS NOTES
Nurse notified Dr. Dani Fish that patient hr is in low 45s. Patient troponin was 30 and 27. Nurse updated him that patient states he took morning dose of metoprolol. Dr. Dani Fish gave orders for iv glucagon 1mg iv x1. Discontinue patient home dose of metoprolol. If hr gets below 40 Dr. Dani Fish wants patient to be started on dopamine gtt.

## 2021-09-02 ENCOUNTER — CARE COORDINATION (OUTPATIENT)
Dept: CARE COORDINATION | Age: 81
End: 2021-09-02

## 2021-09-02 ENCOUNTER — APPOINTMENT (OUTPATIENT)
Dept: NON INVASIVE DIAGNOSTICS | Age: 81
DRG: 310 | End: 2021-09-02
Payer: MEDICARE

## 2021-09-02 LAB
ANION GAP SERPL CALCULATED.3IONS-SCNC: 10 MMOL/L (ref 9–17)
BUN BLDV-MCNC: 16 MG/DL (ref 8–23)
BUN/CREAT BLD: ABNORMAL (ref 9–20)
CALCIUM SERPL-MCNC: 9.5 MG/DL (ref 8.6–10.4)
CHLORIDE BLD-SCNC: 101 MMOL/L (ref 98–107)
CO2: 28 MMOL/L (ref 20–31)
CREAT SERPL-MCNC: 1.33 MG/DL (ref 0.7–1.2)
EKG ATRIAL RATE: 49 BPM
EKG P AXIS: 10 DEGREES
EKG P-R INTERVAL: 156 MS
EKG Q-T INTERVAL: 474 MS
EKG QRS DURATION: 112 MS
EKG QTC CALCULATION (BAZETT): 428 MS
EKG R AXIS: 57 DEGREES
EKG T AXIS: 7 DEGREES
EKG VENTRICULAR RATE: 49 BPM
GFR AFRICAN AMERICAN: >60 ML/MIN
GFR NON-AFRICAN AMERICAN: 51 ML/MIN
GFR SERPL CREATININE-BSD FRML MDRD: ABNORMAL ML/MIN/{1.73_M2}
GFR SERPL CREATININE-BSD FRML MDRD: ABNORMAL ML/MIN/{1.73_M2}
GLUCOSE BLD-MCNC: 109 MG/DL (ref 70–99)
HCT VFR BLD CALC: 41.3 % (ref 41–53)
HEMOGLOBIN: 14 G/DL (ref 13.5–17.5)
LV EF: 63 %
LVEF MODALITY: NORMAL
MCH RBC QN AUTO: 31.7 PG (ref 26–34)
MCHC RBC AUTO-ENTMCNC: 33.8 G/DL (ref 31–37)
MCV RBC AUTO: 93.7 FL (ref 80–100)
NRBC AUTOMATED: ABNORMAL PER 100 WBC
PDW BLD-RTO: 15.6 % (ref 11.5–14.9)
PLATELET # BLD: 218 K/UL (ref 150–450)
PMV BLD AUTO: 6.9 FL (ref 6–12)
POTASSIUM SERPL-SCNC: 4.7 MMOL/L (ref 3.7–5.3)
RBC # BLD: 4.41 M/UL (ref 4.5–5.9)
SODIUM BLD-SCNC: 139 MMOL/L (ref 135–144)
WBC # BLD: 4.9 K/UL (ref 3.5–11)

## 2021-09-02 PROCEDURE — 6360000002 HC RX W HCPCS: Performed by: FAMILY MEDICINE

## 2021-09-02 PROCEDURE — 2580000003 HC RX 258: Performed by: FAMILY MEDICINE

## 2021-09-02 PROCEDURE — 96372 THER/PROPH/DIAG INJ SC/IM: CPT

## 2021-09-02 PROCEDURE — 93010 ELECTROCARDIOGRAM REPORT: CPT | Performed by: INTERNAL MEDICINE

## 2021-09-02 PROCEDURE — 93306 TTE W/DOPPLER COMPLETE: CPT

## 2021-09-02 PROCEDURE — 80048 BASIC METABOLIC PNL TOTAL CA: CPT

## 2021-09-02 PROCEDURE — 6370000000 HC RX 637 (ALT 250 FOR IP): Performed by: FAMILY MEDICINE

## 2021-09-02 PROCEDURE — 99223 1ST HOSP IP/OBS HIGH 75: CPT | Performed by: FAMILY MEDICINE

## 2021-09-02 PROCEDURE — 85027 COMPLETE CBC AUTOMATED: CPT

## 2021-09-02 PROCEDURE — 2060000000 HC ICU INTERMEDIATE R&B

## 2021-09-02 PROCEDURE — 36415 COLL VENOUS BLD VENIPUNCTURE: CPT

## 2021-09-02 RX ADMIN — CLOPIDOGREL BISULFATE 75 MG: 75 TABLET ORAL at 08:55

## 2021-09-02 RX ADMIN — CARBIDOPA AND LEVODOPA 1 TABLET: 25; 100 TABLET ORAL at 08:58

## 2021-09-02 RX ADMIN — CARBIDOPA AND LEVODOPA 1 TABLET: 25; 100 TABLET ORAL at 20:18

## 2021-09-02 RX ADMIN — MAGNESIUM OXIDE TAB 400 MG (241.3 MG ELEMENTAL MG) 400 MG: 400 (241.3 MG) TAB at 08:55

## 2021-09-02 RX ADMIN — ALLOPURINOL 100 MG: 100 TABLET ORAL at 08:55

## 2021-09-02 RX ADMIN — ENOXAPARIN SODIUM 40 MG: 40 INJECTION SUBCUTANEOUS at 08:55

## 2021-09-02 RX ADMIN — ROPINIROLE HYDROCHLORIDE 0.25 MG: 0.5 TABLET, FILM COATED ORAL at 20:18

## 2021-09-02 RX ADMIN — SODIUM CHLORIDE, PRESERVATIVE FREE 10 ML: 5 INJECTION INTRAVENOUS at 08:58

## 2021-09-02 RX ADMIN — PANTOPRAZOLE SODIUM 40 MG: 40 TABLET, DELAYED RELEASE ORAL at 05:18

## 2021-09-02 RX ADMIN — BUTALBITA,ACETAMINOPHEN AND CAFFEINE 1 CAPSULE: 50; 300; 40 CAPSULE ORAL at 06:58

## 2021-09-02 RX ADMIN — ATORVASTATIN CALCIUM 40 MG: 40 TABLET, FILM COATED ORAL at 20:18

## 2021-09-02 RX ADMIN — CARBIDOPA AND LEVODOPA 1 TABLET: 25; 100 TABLET ORAL at 17:23

## 2021-09-02 RX ADMIN — ROPINIROLE HYDROCHLORIDE 0.25 MG: 0.5 TABLET, FILM COATED ORAL at 15:33

## 2021-09-02 RX ADMIN — ROPINIROLE HYDROCHLORIDE 0.25 MG: 0.5 TABLET, FILM COATED ORAL at 08:55

## 2021-09-02 RX ADMIN — SODIUM CHLORIDE, PRESERVATIVE FREE 10 ML: 5 INJECTION INTRAVENOUS at 20:19

## 2021-09-02 RX ADMIN — CARBIDOPA AND LEVODOPA 1 TABLET: 25; 100 TABLET ORAL at 15:33

## 2021-09-02 ASSESSMENT — PAIN SCALES - GENERAL: PAINLEVEL_OUTOF10: 0

## 2021-09-02 NOTE — H&P
Family Medicine Admit Note    PCP: JW Villareal - CNP    Date of Admission: 9/1/2021    Date of Service: Pt seen/examined on 9/2/21 and Admitted to Inpatient. Chief Complaint:  Fatigue                                  Nausea                                  Generalized Body Aches      History Of Present Illness: The patient is a 79 y.o. male who presents to [address] [place] [address] with complaints of generalized fatigue. He reports that he has not felt well for about the past 2 weeks. He reports that he has episodes of fatigue, low energy and weakness. He also reports some nausea without vomiting and a burning sensation in his upper abdomen that radiates up into his chest. He also states that 2 nights ago, he woke up with difficulty breathing. He complains of aching all over. He has been COVID vaccinated. Nothing seems to make his symptoms better or worse. He denies any fevers, chills, cough, congestion, sore throat, palpitations, vomiting, diarrhea, constipation, blood in stools, dysuria, dizziness, numbness, headaches or confusion.     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 ([facility] [address] 3/18/2011    d/t being hurt on the job    CKD (chronic kidney disease) stage 2, GFR 60-89 ml/min     CKD (chronic kidney disease) stage 3, GFR 30-59 ml/min ([facility])     Diverticulosis of colon     GERD (gastroesophageal reflux disease)     Impaired renal function     MRSA (methicillin resistant staph aureus) culture positive 9/23/2015    leg    Osteoarthritis of knee     Left    Parkinson disease ([facility] [address]     Proteinuria     Skull fracture ([facility] [address] 3/18/2011    d/t 12-foot drop on the job    Temporary loss of eyesight     periodically, happened x7    Tubular adenoma of colon 2012, 2017    [facility]       Past Surgical History:        Procedure Laterality Date    COLONOSCOPY  11/02/2012    poor prep, tubular adenoma    COLONOSCOPY  09/19/2017    diverticulosis, multiple polyps as described, spot marking done, pathology-tubular adenoma x 4    NH COLSC FLX W/RMVL OF TUMOR POLYP LESION SNARE TQ N/A 9/19/2017    COLONOSCOPY POLYPECTOMY SNARE/COLD BIOPSY with tatooing and apc transverse colon performed by James Lemon MD at [address] [place] [address] Right     rotator cuff       Medications Prior to Admission:    Prior to Admission medications    Medication Sig Start Date End Date Taking? Authorizing Provider   rOPINIRole (REQUIP) 0.25 MG tablet Take 0.25 mg by mouth 3 times daily   Yes Historical Provider, MD   metoprolol tartrate (LOPRESSOR) 25 MG tablet Take 0.5 tablets by mouth 2 times daily 8/11/21  Yes Ger Christensen MD   nitroGLYCERIN (NITROSTAT) 0.4 MG SL tablet Place 1 tablet under the tongue every 5 minutes as needed for Chest pain If no relief of chest pain after 3rd dose, go to the ER or call 911 6/14/21  Yes Nancy , APRN - CNP   atorvastatin (LIPITOR) 40 MG tablet take 1 tablet by mouth once daily 6/14/21  Yes Nancy , APRN - CNP   allopurinol (ZYLOPRIM) 100 MG tablet Take 1 tablet by mouth daily 6/14/21  Yes Nancy , APRN - CNP   acetaminophen (TYLENOL 8 HOUR ARTHRITIS PAIN) 650 MG extended release tablet Take 1 tablet by mouth every 8 hours as needed for Pain 6/14/21  Yes Nancy , APRN - CNP   butalbital-acetaminophen-caffeine (FIORICET, ESGIC) -40 MG per tablet Take 1 tab prn only for severe headache. Can repeat after 4 hrs if needed. Max 2 tabs/24 hrs.  Max 4 tabs/week 6/14/21  Yes Nancy , APRN - CNP   carbidopa-levodopa (SINEMET)  MG per tablet Take 1 tablet by mouth 4 times daily 6/14/21 9/12/21 Yes Nancy , APRN - CNP   clopidogrel (PLAVIX) 75 MG tablet Take 1 tablet by mouth daily 6/14/21  Yes Nancy , APRN - CNP   esomeprazole (NEXIUM) 20 MG delayed release capsule Take 1 capsule by mouth every morning (before breakfast) 6/14/21  Yes Nancy , APRN - CNP magnesium oxide (MAG-OX) 400 MG tablet Take 1 tablet by mouth daily 6/14/21  Yes Ines Purple, APRN - CNP       Allergies:  Dye [iodides] and Aspirin    Social History:  The patient currently lives at home. TOBACCO:   reports that he has quit smoking. He has never used smokeless tobacco.  ETOH:   reports no history of alcohol use. Review of Systems - General ROS: positive for  - fatigue and overweight  Psychological ROS: positive for - depression  Ophthalmic ROS: positive for - uses glasses  ENT ROS: negative  Endocrine ROS: positive for - hyperglycemia  Respiratory ROS: negative  Cardiovascular ROS: positive for - irregular heartbeat  Gastrointestinal ROS: negative  Musculoskeletal ROS: positive for - muscular weakness  Neurological ROS: positive for - tremors      Family History:          Problem Relation Age of Onset    No Known Problems Mother     No Known Problems Father        PHYSICAL EXAM:    /60   Pulse 56   Temp 97.5 °F (36.4 °C) (Oral)   Resp 18   Ht 5' 7\" (1.702 m)   Wt 170 lb 10.2 oz (77.4 kg)   SpO2 98%   BMI 26.73 kg/m²     General appearance: No apparent distress appears stated age and cooperative. HEENT Normal cephalic, atraumatic without obvious deformity. Pupils equal, round, and reactive to light. Extra ocular muscles intact. Conjunctivae/corneas clear. Neck: Supple, No jugular venous distention/bruits. Trachea midline without thyromegaly or adenopathy with full range of motion. Lungs: Clear to auscultation, bilaterally without Rales/Wheezes/Rhonchi with good respiratory effort. Heart: Bradycardic rate and rhythm with Normal S1/S2 without murmurs, rubs or gallops, point of maximum impulse non-displaced  Abdomen: Soft, non-tender or non-distended without rigidity or guarding and positive bowel sounds all four quadrants. Extremities: No clubbing, cyanosis, or edema bilaterally. Full range of motion without deformity and normal gait intact.   Skin: Skin color, 08/31/2017    Essential hypertension [I10] 03/23/2015    Hyperlipidemia [E78.5] 08/27/2014    Depression [F32.9] 04/07/2014    CKD (chronic kidney disease) stage 3, GFR 30-59 ml/min ([facility]) [N18.30] 03/04/2014         ASSESSMENT/PLAN:  Patient admitted with symptomatic bradycardia. Co-morbidities as above. Orders Placed This Encounter   Procedures    COVID-19, Rapid    XR CHEST PORTABLE    NM Cardiac Stress Test Nuclear Imaging    CBC Auto Differential    Comprehensive Metabolic Panel    Lipase    Troponin    LACTATE DEHYDROGENASE    C-Reactive Protein    Procalcitonin    TSH w/reflex to FT4    Urinalysis Reflex to Culture    Magnesium    Brain Natriuretic Peptide    CBC    Basic Metabolic Panel    ADULT DIET; Regular    Diet NPO    Vital signs per unit routine    Telemetry monitoring - continuous duration    Notify physician    Up with assistance    Full Code    Inpatient consult to Primary Care Provider    Inpatient consult to Cardiology    Inpatient consult to Social Work    Contact isolation    OT eval and treat    PT eval and treat    Initiate Oxygen Therapy Protocol    Nasal Cannula Oxygen    Cardiac Stress Test- W Pharm    EKG 12 Lead    ECHO Complete 2D W Doppler W Color    Insert peripheral IV    PATIENT STATUS (DIRECT) Inpatient    PATIENT STATUS (FROM ED OR OR/PROCEDURAL) Inpatient         DVT Prophylaxis:   Diet: ADULT DIET;  Regular  Code Status: Full Code      Dispo - admitted to Progressive       @[email or web address]@

## 2021-09-02 NOTE — CONSULTS
[facility]    Consult                        Today's Date: 9/2/2021  Patient Name: Gian Waterman  Date of admission: 9/1/2021 11:17 AM  Patient's age: 79 y.o., 1940  Admission Dx: General weakness [R53.1]  Symptomatic bradycardia [R00.1]    Reason for Consult:  Cardiac evaluation    Requesting Physician: Luna Gallardo MD    CHIEF COMPLAINT:  Generalized weakness    History Obtained From:  patient, electronic medical record    HISTORY OF PRESENT ILLNESS:      The patient is a 79 y.o.  male who is admitted to the hospital for weakness/fatigue. Gian Waterman is a 79 y.o. male who presents with a chief complaint of generalized fatigue. Patient states he is been sick for about 2 weeks. He states he feels intermittently very fatigued, low energy, generalized weakness. He states he is also developed nausea and a burning sensation in his upper abdomen that radiates up into his chest after he eats. He states he aches all over. No fevers at home. No changes in bowel or bladder habits. No loss of taste or smell. He denies any chest pain, palpitations, or shortness of breath to me. States he gets \"a little winded when I walk and get so tired\" but states this resolves with rest.  He was vaccinated for COVID-19. He was here to the emergency department about a week ago with similar symptoms and discharged. He states he never really felt any better after that. Symptoms are moderate. Nothing else make symptoms better or worse. He states he only follows with his PCP - Randi CAUSEY but could not get in to the office to see her until Sept. 12th.       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 [address] Proteinuria, Skull fracture ([address] Temporary loss of eyesight, and Tubular adenoma of colon. Past Surgical History:   has a past surgical history that includes Colonoscopy (11/02/2012); shoulder surgery (Right); pr colsc flx w/rmvl of tumor polyp lesion snare tq (N/A, 9/19/2017); and Colonoscopy (09/19/2017). Home Medications:    Prior to Admission medications    Medication Sig Start Date End Date Taking? Authorizing Provider   rOPINIRole (REQUIP) 0.25 MG tablet Take 0.25 mg by mouth 3 times daily   Yes Historical Provider, MD   metoprolol tartrate (LOPRESSOR) 25 MG tablet Take 0.5 tablets by mouth 2 times daily 8/11/21  Yes Si Portal, MD   nitroGLYCERIN (NITROSTAT) 0.4 MG SL tablet Place 1 tablet under the tongue every 5 minutes as needed for Chest pain If no relief of chest pain after 3rd dose, go to the ER or call 911 6/14/21  Yes JW Iniguez CNP   atorvastatin (LIPITOR) 40 MG tablet take 1 tablet by mouth once daily 6/14/21  Yes WJ Iniguez CNP   allopurinol (ZYLOPRIM) 100 MG tablet Take 1 tablet by mouth daily 6/14/21  Yes JW Iniguez CNP   acetaminophen (TYLENOL 8 HOUR ARTHRITIS PAIN) 650 MG extended release tablet Take 1 tablet by mouth every 8 hours as needed for Pain 6/14/21  Yes JW Iniguez CNP   butalbital-acetaminophen-caffeine (FIORICET, ESGIC) -40 MG per tablet Take 1 tab prn only for severe headache. Can repeat after 4 hrs if needed. Max 2 tabs/24 hrs.  Max 4 tabs/week 6/14/21  Yes JW Iniguez CNP   carbidopa-levodopa (SINEMET)  MG per tablet Take 1 tablet by mouth 4 times daily 6/14/21 9/12/21 Yes JW Iniguez CNP   clopidogrel (PLAVIX) 75 MG tablet Take 1 tablet by mouth daily 6/14/21  Yes JW Iniguez CNP   esomeprazole (NEXIUM) 20 MG delayed release capsule Take 1 capsule by mouth every morning (before breakfast) 6/14/21  Yes JW Villareal CNP   magnesium oxide (MAG-OX) 400 MG tablet Take 1 tablet by mouth daily 6/14/21  Yes Clive Contreras, APRN - CNP       Allergies:  Dye [iodides] and Aspirin    Social History:   reports that he has quit smoking. He has never used smokeless tobacco. He reports that he does not drink alcohol and does not use drugs. Family History: family history includes No Known Problems in his father and mother. No h/o sudden cardiac death. No for premature CAD    REVIEW OF SYSTEMS:    · Constitutional: there has been no unanticipated weight loss. There's been No change in energy level, No change in activity level. · Eyes: No visual changes or diplopia. No scleral icterus. · ENT: No Headaches, hearing loss or vertigo. No mouth sores or sore throat. · Cardiovascular: see above  · Respiratory: see above  · Gastrointestinal: No abdominal pain, appetite loss, blood in stools. · Genitourinary: No dysuria, trouble voiding, or hematuria. · Musculoskeletal:  No gait disturbance, No weakness or joint complaints. · Integumentary: No rash or pruritis. · Neurological: No headache or diplopia. No tingling  · Psychiatric: No anxiety, or depression. · Endocrine: No temperature intolerance. · Hematologic/Lymphatic: No abnormal bruising or bleeding, blood clots or swollen lymph nodes. · Allergic/Immunologic: No nasal congestion or hives. PHYSICAL EXAM:      BP (!) 107/56   Pulse (!) 49   Temp 97.7 °F (36.5 °C) (Oral)   Resp 19   Ht 5' 7\" (1.702 m)   Wt 170 lb 10.2 oz (77.4 kg)   SpO2 97%   BMI 26.73 kg/m²    Constitutional and General Appearance: alert, cooperative, no distress and appears stated age  HEENT: PERRL, no cervical lymphadenopathy. No masses palpable. Normal oral mucosa  Respiratory:  · Normal excursion and expansion without use of accessory muscles  · Resp Auscultation: Good respiratory effort. No for increased work of breathing. On auscultation: clear to auscultation, diminished bases bilaterally. No audible rales, rhonchi, or wheezing. · On RA without distress  Cardiovascular:  · Heart tones are crisp and normal. regular S1 and S2.  · Jugular venous pulsation Normal  · The carotid upstroke is normal in amplitude and contour without delay or bruit   · Currently SR 60. Noted sinus bradycardia down to 45 while sleeping. Abdomen:   · soft  · Bowel sounds present  Extremities:  ·  No edema  Neurological:  · Alert and oriented. DATA:    Diagnostics:    EKG: nonspecific ST and T waves changes, sinus bradycardia. ECHO: previously taken 2018 and shown as below. Ejection fraction: 40%  Stress Test: not obtained. Cardiac Angiography: previously taken 2018 and show as below. Echo 5/2018  Summary  Global left ventricular systolic function is mildly reduced with an  estimated ejection fraction of 40-45 % . Apical and anteroseptal hypokinesis  is noted. No significant valvular regurgitation or stenosis seen. Cardiac Cath 5/2018  Findings:     Left main: NL     LAD: 100% Ostial underwent thrombectomy and JENN with restoration of GREGORY III flow     LCX: NL     RCA: NL     The LV gram was performed in the TIERNEY 30 position. LVEF: 40%. LV Wall Motion: Anteroapical hypokinesis     Conclusions:     1. Recent occlusion of LAD underwent JENN  2. Normal LCX and RCA  3. Mild LV dysfunction     Recommendation:     1. Routine post PCI orders  2. Medical treatments. 3. Risk factors modifications. Labs:     CBC:   Recent Labs     09/01/21  1148 09/02/21  0518   WBC 5.9 4.9   HGB 14.5 14.0   HCT 42.5 41.3    218     BMP:   Recent Labs     09/01/21  1148 09/02/21  0518   * 139   K 4.8 4.7   CO2 26 28   BUN 13 16   CREATININE 1.36* 1.33*   LABGLOM 50* 51*   GLUCOSE 113* 109*     BNP: No results for input(s): BNP in the last 72 hours. PT/INR: No results for input(s): PROTIME, INR in the last 72 hours. APTT:No results for input(s):  APTT in the last 72 hours. CARDIAC ENZYMES:No results for input(s): CKTOTAL, CKMB, CKMBINDEX, TROPONINI in the last 72 hours. FASTING LIPID PANEL:  Lab Results   Component Value Date    HDL 54 05/11/2021    LDLCALC 58 01/08/2018    TRIG 70 07/20/2020     LIVER PROFILE:  Recent Labs     09/01/21  1148   AST 22   ALT 29   LABALBU 4.2       IMPRESSION:    Patient Active Problem List   Diagnosis    Temporary loss of eyesight    Impaired renal function    Syncope : posterior circulation stroke vs Neurocardiogenic syncope     Intracranial bleed : traumatic left sub-dural hematoma ,managed conservatively in 2011    Headache    CKD (chronic kidney disease) stage 3, GFR 30-59 ml/min ([facility]    Depression    Hyperlipidemia    Microhematuria    Microalbuminuria    Essential hypertension    Generalized abdominal pain    Tubular adenoma of colon    Diverticulosis of colon    History of skull fracture    NSTEMI (non-ST elevated myocardial infarction) ([facility])    Nausea    Pure hypercholesterolemia    Prediabetes    Parkinson disease ([facility] [address]    Chronic post-traumatic headache, not intractable    Fall (on) (from) other stairs and steps, initial encounter    Strain of iliopsoas muscle, initial encounter    Acute pain of left knee    Closed fracture of second toe of right foot    Closed fracture of second toe of left foot    Abnormal ECG    Cerebrovascular accident ([facility] [address]    Chest pain, unspecified    Hematoma of scalp    Left ventricular hypertrophy    Mild aortic valve regurgitation    Pulmonary hypertension, mild ([facility])    Lumbar radiculopathy    Dizziness    Hyponatremia    Vomiting    General weakness    Overweight (BMI 25.0-29. 9)    Frequent falls    Symptomatic bradycardia     1. Weakness/fatigue  2. Sinus bradycardia  3. CAD with PTCA/JENN to LAD in 2018  4. 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 CKD  6. HTN  7. HLP  8. SDH in 2011    RECOMMENDATIONS:  1.  Presently denies any chest pain, dizziness,

## 2021-09-02 NOTE — CARE COORDINATION
HC attempted to contact patient, his mailbox is full.     Plan of Care  [facility]. will attempt to contact patient again next week

## 2021-09-02 NOTE — PLAN OF CARE
Problem: Safety:  Goal: Free from accidental physical injury  Description: Free from accidental physical injury  Outcome: Met This Shift  Goal: Free from intentional harm  Description: Free from intentional harm  Outcome: Met This Shift     Problem: Daily Care:  Goal: Daily care needs are met  Description: Daily care needs are met  Outcome: Met This Shift     Problem: Pain:  Goal: Patient's pain/discomfort is manageable  Description: Patient's pain/discomfort is manageable  Outcome: Met This Shift     Problem: Skin Integrity:  Goal: Skin integrity will stabilize  Description: Skin integrity will stabilize  Outcome: Met This Shift     Problem: Falls - Risk of:  Goal: Will remain free from falls  Description: Will remain free from falls  Outcome: Met This Shift  Goal: Absence of physical injury  Description: Absence of physical injury  Outcome: Met This Shift

## 2021-09-02 NOTE — CARE COORDINATION
Spoke with pt regarding discharge plans home. Pt reported that he didn't feel well enough to return home alone. Pt states that he is has four children that live in the area, however cares for himself. Pt reports that he has gone to rehab in the past through [facility]. Pt states that he would be fine returning there again. Faxed referral and notified Jessica Hess [email or web address].

## 2021-09-02 NOTE — CARE COORDINATION
CASE MANAGEMENT NOTE:    Admission Date:  9/1/2021 Juan Antonio Mccormack is a 79 y.o.  male    Admitted for : General weakness [R53.1]  Symptomatic bradycardia [R00.1]    Met with:  Patient    PCP:  Lynette Mejia NP                                Insurance:  [employer]      Is patient alert and oriented at time of discussion:  Yes    Current Residence/ Living Arrangements:  independently at home             Current Services PTA:  No    Does patient go to outpatient dialysis: No  If yes, location and chair time: N/A    Is patient agreeable to VNS: Yes    Freedom of choice provided:  Yes    List of [address] provided: Yes    VNS chosen:  Yes    DME:  straight cane, walker and shower chair    Home Oxygen: No    Nebulizer: No    CPAP/BIPAP: No    Supplier: N/A    Potential Assistance Needed: Yes    SNF needed: Yes    Freedom of choice and list provided: LSW notified to see patient    Pharmacy:  [facility]       Does Patient want to use MEDS to BEDS? No    Is patient currently receiving oral anticoagulation therapy? No    Is the Patient an [facility] with Readmission Risk Score greater than 14%? Yes  If yes, pt needs a follow up appointment made within 7 days. Family Members/Caregivers that pt would like involved in their care:    Yes    If yes, list name here:  Alirio Anthony    Transportation Provider:  Patient             Discharge Plan:  9/2: [address] home with 2nd bed and 1st bath. States he has been having difficulty at home. DME - cane walker, [place]. Open to VNS, however thinks he needs SNF - LSW notified to see patient. Cardio consult - May need holter at discharge. PT/OT ordered. ORANGE HEADER - 19%.  JOVANNA NEEDS SIGNED/COMPLETED. Jessy You                 Electronically signed by: Anibal Cabral RN on 9/2/2021 at 12:40 PM

## 2021-09-03 ENCOUNTER — APPOINTMENT (OUTPATIENT)
Dept: NUCLEAR MEDICINE | Age: 81
DRG: 310 | End: 2021-09-03
Payer: MEDICARE

## 2021-09-03 LAB
ANION GAP SERPL CALCULATED.3IONS-SCNC: 8 MMOL/L (ref 9–17)
BUN BLDV-MCNC: 19 MG/DL (ref 8–23)
BUN/CREAT BLD: ABNORMAL (ref 9–20)
CALCIUM SERPL-MCNC: 9.2 MG/DL (ref 8.6–10.4)
CHLORIDE BLD-SCNC: 99 MMOL/L (ref 98–107)
CO2: 28 MMOL/L (ref 20–31)
CREAT SERPL-MCNC: 1.6 MG/DL (ref 0.7–1.2)
GFR AFRICAN AMERICAN: 50 ML/MIN
GFR NON-AFRICAN AMERICAN: 42 ML/MIN
GFR SERPL CREATININE-BSD FRML MDRD: ABNORMAL ML/MIN/{1.73_M2}
GFR SERPL CREATININE-BSD FRML MDRD: ABNORMAL ML/MIN/{1.73_M2}
GLUCOSE BLD-MCNC: 107 MG/DL (ref 70–99)
HCT VFR BLD CALC: 40.9 % (ref 41–53)
HEMOGLOBIN: 14 G/DL (ref 13.5–17.5)
LV EF: 59 %
LVEF MODALITY: NORMAL
MCH RBC QN AUTO: 32 PG (ref 26–34)
MCHC RBC AUTO-ENTMCNC: 34.4 G/DL (ref 31–37)
MCV RBC AUTO: 93.2 FL (ref 80–100)
NRBC AUTOMATED: ABNORMAL PER 100 WBC
PDW BLD-RTO: 15.5 % (ref 11.5–14.9)
PLATELET # BLD: 210 K/UL (ref 150–450)
PMV BLD AUTO: 6.7 FL (ref 6–12)
POTASSIUM SERPL-SCNC: 5.1 MMOL/L (ref 3.7–5.3)
RBC # BLD: 4.38 M/UL (ref 4.5–5.9)
SODIUM BLD-SCNC: 135 MMOL/L (ref 135–144)
WBC # BLD: 6.1 K/UL (ref 3.5–11)

## 2021-09-03 PROCEDURE — 2060000000 HC ICU INTERMEDIATE R&B

## 2021-09-03 PROCEDURE — 2580000003 HC RX 258: Performed by: FAMILY MEDICINE

## 2021-09-03 PROCEDURE — A9500 TC99M SESTAMIBI: HCPCS | Performed by: INTERNAL MEDICINE

## 2021-09-03 PROCEDURE — 6360000002 HC RX W HCPCS: Performed by: INTERNAL MEDICINE

## 2021-09-03 PROCEDURE — 2580000003 HC RX 258: Performed by: INTERNAL MEDICINE

## 2021-09-03 PROCEDURE — 85027 COMPLETE CBC AUTOMATED: CPT

## 2021-09-03 PROCEDURE — 99233 SBSQ HOSP IP/OBS HIGH 50: CPT | Performed by: FAMILY MEDICINE

## 2021-09-03 PROCEDURE — 93017 CV STRESS TEST TRACING ONLY: CPT

## 2021-09-03 PROCEDURE — 6360000002 HC RX W HCPCS: Performed by: FAMILY MEDICINE

## 2021-09-03 PROCEDURE — 96372 THER/PROPH/DIAG INJ SC/IM: CPT

## 2021-09-03 PROCEDURE — 80048 BASIC METABOLIC PNL TOTAL CA: CPT

## 2021-09-03 PROCEDURE — 6370000000 HC RX 637 (ALT 250 FOR IP): Performed by: FAMILY MEDICINE

## 2021-09-03 PROCEDURE — 78452 HT MUSCLE IMAGE SPECT MULT: CPT

## 2021-09-03 PROCEDURE — 36415 COLL VENOUS BLD VENIPUNCTURE: CPT

## 2021-09-03 PROCEDURE — 3430000000 HC RX DIAGNOSTIC RADIOPHARMACEUTICAL: Performed by: INTERNAL MEDICINE

## 2021-09-03 RX ORDER — SODIUM CHLORIDE 0.9 % (FLUSH) 0.9 %
5-40 SYRINGE (ML) INJECTION PRN
Status: ACTIVE | OUTPATIENT
Start: 2021-09-03 | End: 2021-09-03

## 2021-09-03 RX ORDER — SODIUM CHLORIDE 0.9 % (FLUSH) 0.9 %
10 SYRINGE (ML) INJECTION PRN
Status: DISCONTINUED | OUTPATIENT
Start: 2021-09-03 | End: 2021-09-07

## 2021-09-03 RX ORDER — NITROGLYCERIN 0.4 MG/1
0.4 TABLET SUBLINGUAL EVERY 5 MIN PRN
Status: ACTIVE | OUTPATIENT
Start: 2021-09-03 | End: 2021-09-03

## 2021-09-03 RX ORDER — SODIUM CHLORIDE 9 MG/ML
500 INJECTION, SOLUTION INTRAVENOUS CONTINUOUS PRN
Status: ACTIVE | OUTPATIENT
Start: 2021-09-03 | End: 2021-09-03

## 2021-09-03 RX ORDER — ATROPINE SULFATE 0.1 MG/ML
0.5 INJECTION INTRAVENOUS EVERY 5 MIN PRN
Status: ACTIVE | OUTPATIENT
Start: 2021-09-03 | End: 2021-09-03

## 2021-09-03 RX ORDER — AMINOPHYLLINE DIHYDRATE 25 MG/ML
50 INJECTION, SOLUTION INTRAVENOUS PRN
Status: ACTIVE | OUTPATIENT
Start: 2021-09-03 | End: 2021-09-03

## 2021-09-03 RX ORDER — ALBUTEROL SULFATE 90 UG/1
2 AEROSOL, METERED RESPIRATORY (INHALATION) PRN
Status: ACTIVE | OUTPATIENT
Start: 2021-09-03 | End: 2021-09-03

## 2021-09-03 RX ORDER — METOPROLOL TARTRATE 5 MG/5ML
5 INJECTION INTRAVENOUS EVERY 5 MIN PRN
Status: ACTIVE | OUTPATIENT
Start: 2021-09-03 | End: 2021-09-03

## 2021-09-03 RX ADMIN — ROPINIROLE HYDROCHLORIDE 0.25 MG: 0.5 TABLET, FILM COATED ORAL at 12:39

## 2021-09-03 RX ADMIN — TETRAKIS(2-METHOXYISOBUTYLISOCYANIDE)COPPER(I) TETRAFLUOROBORATE 11.4 MILLICURIE: 1 INJECTION, POWDER, LYOPHILIZED, FOR SOLUTION INTRAVENOUS at 07:21

## 2021-09-03 RX ADMIN — SODIUM CHLORIDE, PRESERVATIVE FREE 10 ML: 5 INJECTION INTRAVENOUS at 07:21

## 2021-09-03 RX ADMIN — SODIUM CHLORIDE, PRESERVATIVE FREE 10 ML: 5 INJECTION INTRAVENOUS at 12:39

## 2021-09-03 RX ADMIN — SODIUM CHLORIDE, PRESERVATIVE FREE 10 ML: 5 INJECTION INTRAVENOUS at 20:10

## 2021-09-03 RX ADMIN — TETRAKIS(2-METHOXYISOBUTYLISOCYANIDE)COPPER(I) TETRAFLUOROBORATE 39.1 MILLICURIE: 1 INJECTION, POWDER, LYOPHILIZED, FOR SOLUTION INTRAVENOUS at 10:41

## 2021-09-03 RX ADMIN — REGADENOSON 0.4 MG: 0.08 INJECTION, SOLUTION INTRAVENOUS at 10:39

## 2021-09-03 RX ADMIN — ACETAMINOPHEN 650 MG: 325 TABLET, FILM COATED ORAL at 23:57

## 2021-09-03 RX ADMIN — ROPINIROLE HYDROCHLORIDE 0.25 MG: 0.5 TABLET, FILM COATED ORAL at 20:09

## 2021-09-03 RX ADMIN — CARBIDOPA AND LEVODOPA 1 TABLET: 25; 100 TABLET ORAL at 17:54

## 2021-09-03 RX ADMIN — ENOXAPARIN SODIUM 40 MG: 40 INJECTION SUBCUTANEOUS at 17:54

## 2021-09-03 RX ADMIN — CARBIDOPA AND LEVODOPA 1 TABLET: 25; 100 TABLET ORAL at 20:09

## 2021-09-03 RX ADMIN — PANTOPRAZOLE SODIUM 40 MG: 40 TABLET, DELAYED RELEASE ORAL at 12:40

## 2021-09-03 RX ADMIN — ATORVASTATIN CALCIUM 40 MG: 40 TABLET, FILM COATED ORAL at 20:09

## 2021-09-03 RX ADMIN — ALLOPURINOL 100 MG: 100 TABLET ORAL at 12:39

## 2021-09-03 RX ADMIN — MAGNESIUM OXIDE TAB 400 MG (241.3 MG ELEMENTAL MG) 400 MG: 400 (241.3 MG) TAB at 12:38

## 2021-09-03 RX ADMIN — CARBIDOPA AND LEVODOPA 1 TABLET: 25; 100 TABLET ORAL at 12:38

## 2021-09-03 RX ADMIN — CLOPIDOGREL BISULFATE 75 MG: 75 TABLET ORAL at 12:38

## 2021-09-03 ASSESSMENT — PAIN SCALES - GENERAL: PAINLEVEL_OUTOF10: 8

## 2021-09-03 NOTE — PROGRESS NOTES
Dr. Odalis Felix wants PT/OT to work with patient and social work on to work on discharge planning. Dr. Odalis Felix concerned about patient returning home alone. PT/OT orders in.

## 2021-09-03 NOTE — PLAN OF CARE
Problem: Safety:  Goal: Free from accidental physical injury  Description: Free from accidental physical injury  9/3/2021 0451 by Marya Burnham RN  Outcome: Ongoing  9/2/2021 1848 by Cierra Leblanc RN  Outcome: Met This Shift  Goal: Free from intentional harm  Description: Free from intentional harm  9/3/2021 0451 by Marya Burnham RN  Outcome: Ongoing  9/2/2021 1848 by Cierra Leblanc RN  Outcome: Met This Shift     Problem: Safety:  Goal: Free from intentional harm  Description: Free from intentional harm  9/3/2021 0451 by Marya Burnham RN  Outcome: Ongoing  9/2/2021 1848 by Cierra Leblanc RN  Outcome: Met This Shift     Problem: Daily Care:  Goal: Daily care needs are met  Description: Daily care needs are met  9/3/2021 0451 by Marya Burnham RN  Outcome: Ongoing  9/2/2021 1848 by Cierra Leblanc RN  Outcome: Met This Shift     Problem: Pain:  Goal: Patient's pain/discomfort is manageable  Description: Patient's pain/discomfort is manageable  9/3/2021 0451 by Marya Burnham RN  Outcome: Ongoing  9/2/2021 1848 by Cierra Leblanc RN  Outcome: Met This Shift

## 2021-09-03 NOTE — PROGRESS NOTES
Physical Therapy        Physical Therapy Cancel Note      DATE: 9/3/2021    NAME: Lv Bearden  MRN: [id number]   : 1940      Patient not seen this date for Physical Therapy due to:  9/3/2021 10:08 pt not in room; out for testing (stress test).  PT plans to check back with patient this PM    Electronically signed by Justa Choi, PT on 9/3/2021 at 10:46 AM

## 2021-09-03 NOTE — PROGRESS NOTES
FAMILY MEDICINE  - PROGRESS NOTE    Date:  9/3/2021  Gregg Meyers  [id number]      Chief Complaint   Patient presents with    Nausea    Fatigue    Generalized Body Aches         Interval History:  not changed much, his heart rate is better without the Metoprolol. He is having a stress test today. He denies any chest pain. Specialists notes, labs & imaging reviewed.       Subjective  Constitutional: positive for fatigue and overweight  Eyes: positive for contacts/glasses  Ears, nose, mouth, throat, and face: negative  Respiratory: negative  Cardiovascular: negative  Gastrointestinal: positive for reflux symptoms and diverticulosis  Hematologic/lymphatic: negative  Musculoskeletal:positive for arthralgias, muscle weakness, myalgias and stiff joints  Neurological: positive for gait problems, headaches, tremors and weakness  Behavioral/Psych: positive for depression:    Objective:    /66   Pulse 57   Temp 97.3 °F (36.3 °C) (Axillary)   Resp 18   Ht 5' 7\" (1.702 m)   Wt 167 lb 15.9 oz (76.2 kg)   SpO2 96%   BMI 26.31 kg/m²   General appearance - alert, well appearing, and in no distress and overweight  Mental status - alert, oriented to person, place, and time  Eyes - pupils equal and reactive, extraocular eye movements intact  Ears - hearing grossly normal bilaterally  Nose - normal and patent, no erythema, discharge or polyps  Mouth - mucous membranes moist, pharynx normal without lesions  Neck - supple, no significant adenopathy  Lymphatics - no palpable lymphadenopathy, no hepatosplenomegaly  Chest - clear to auscultation, no wheezes, rales or rhonchi, symmetric air entry  Heart - bradycardic rate, regular rhythm, normal S1, S2, no murmurs, rubs, clicks or gallops  Abdomen - soft, nontender, nondistended, no masses or organomegaly  Breasts - not examined  Back exam - not examined  Neurological - neck supple without rigidity, abnormal neurological exam 75 mg at 09/02/21 0855    pantoprazole (PROTONIX) tablet 40 mg  40 mg Oral QAM AC Sehrine Alexander MD   40 mg at 09/02/21 0518    rOPINIRole (REQUIP) tablet 0.25 mg  0.25 mg Oral TID Sherine Alexander MD   0.25 mg at 09/02/21 2018    magnesium oxide (MAG-OX) tablet 400 mg  400 mg Oral Daily Sherine Alexander MD   400 mg at 09/02/21 0855       Intake/Output Summary (Last 24 hours) at 9/3/2021 0700  Last data filed at 9/3/2021 0606  Gross per 24 hour   Intake 840 ml   Output 2150 ml   Net -1310 ml     Recent Results (from the past 24 hour(s))   CBC    Collection Time: 09/03/21  5:21 AM   Result Value Ref Range    WBC 6.1 3.5 - 11.0 k/uL    RBC 4.38 (L) 4.5 - 5.9 m/uL    Hemoglobin 14.0 13.5 - 17.5 g/dL    Hematocrit 40.9 (L) 41 - 53 %    MCV 93.2 80 - 100 fL    MCH 32.0 26 - 34 pg    MCHC 34.4 31 - 37 g/dL    RDW 15.5 (H) 11.5 - 14.9 %    Platelets 406 265 - 107 k/uL    MPV 6.7 6.0 - 12.0 fL    NRBC Automated NOT REPORTED per 100 WBC   Basic Metabolic Panel    Collection Time: 09/03/21  5:21 AM   Result Value Ref Range    Glucose 107 (H) 70 - 99 mg/dL    BUN 19 8 - 23 mg/dL    CREATININE 1.60 (H) 0.70 - 1.20 mg/dL    Bun/Cre Ratio NOT REPORTED 9 - 20    Calcium 9.2 8.6 - 10.4 mg/dL    Sodium 135 135 - 144 mmol/L    Potassium 5.1 3.7 - 5.3 mmol/L    Chloride 99 98 - 107 mmol/L    CO2 28 20 - 31 mmol/L    Anion Gap 8 (L) 9 - 17 mmol/L    GFR Non-African American 42 (L) >60 mL/min    GFR African American 50 (L) >60 mL/min    GFR Comment          GFR Staging NOT REPORTED      -----------------------------------------------------------------  RAD:  EKG:  Micro:     Assessment & Plan:    Patient Active Problem List:     Temporary loss of eyesight     Impaired renal function     Syncope : posterior circulation stroke vs Neurocardiogenic syncope      Intracranial bleed : traumatic left sub-dural hematoma ,managed conservatively in 2011     Headache     CKD (chronic kidney disease) stage 3, GFR 30-59 ml/min ([facility]) Depression     Hyperlipidemia     Microhematuria     Microalbuminuria     Essential hypertension     Generalized abdominal pain     Tubular adenoma of colon     Diverticulosis of colon     History of skull fracture     NSTEMI (non-ST elevated myocardial infarction) ([facility])     Nausea     Pure hypercholesterolemia     Prediabetes     Parkinson disease ([facility])     Chronic post-traumatic headache, not intractable     Fall (on) (from) other stairs and steps, initial encounter     Strain of iliopsoas muscle, initial encounter     Acute pain of left knee     Closed fracture of second toe of right foot     Closed fracture of second toe of left foot     Abnormal ECG     Cerebrovascular accident ([facility] [address]     Chest pain, unspecified     Hematoma of scalp     Left ventricular hypertrophy     Mild aortic valve regurgitation     Pulmonary hypertension, mild ([facility])     Lumbar radiculopathy     Dizziness     Hyponatremia     Vomiting     General weakness     Overweight (BMI 25.0-29. 9)     Frequent falls     Symptomatic bradycardia           Plan:  -Symptomatic bradycardia - improved off of Metoprolol, stress test today, Cardiology following and recommends outpatient Holter monitor.  -Parkinson's disease - unchanged, on Sinemet & Requip.  -Generalized weakness - PT/OT eval & treat,  following due to patient wanting SNF for rehab.  -Continue current treatments.  -Complete orders per chart.     See orders   Disposition:    Electronically signed by Brea Quick MD on 9/3/2021 at 7:00 AM

## 2021-09-03 NOTE — PROGRESS NOTES
[address]   OCCUPATIONAL THERAPY MISSED TREATMENT NOTE   INPATIENT   Date: 9/3/21  Patient Name: Gian Waterman       Room: 7183/2072-02  MRN: [id number]   Account #: [de-identified]    : 1940  (79 y.o.)  Gender: male                 REASON FOR MISSED TREATMENT:  Patient at testing and/or off the floor   -   Testing - Patient out of room for stress test at 1008 AM. Will re-attempt as able this PM.      Sharifa Aviles OT

## 2021-09-03 NOTE — PROCEDURES
[facility]                    [facility]                              CARDIAC STRESS TEST    PATIENT NAME: Florida Campos                      :        1940  MED REC NO:   [id number]                              ROOM:       2088  ACCOUNT NO:   [de-identified]                           ADMIT DATE: 2021  PROVIDER:     Melanie Serra    DATE OF STUDY:  2021    TEST TYPE: LEXISCAN CARDIOLYTE STRESS TEST  INDICATION: ABNORMAL EKG, SHORTNESS OF BREATH, BRADYCARDIA  REFERRING PHYSICIAN: RYAN MCMAHAN    RESTING HEART RATE: 56 BEATS PER MINUTE  RESTING BLOOD PRESSURE: 137/69    MEDICATION(S) GIVEN: 0.4MG IV LEXISCAN  REASON FOR TERMINATION: MEDICATION INFUSION COMPLETE    RESTING EKG: ABNORMAL, SINUS BRADYCARDIA, 56 BEATS PER MINUTE, RIGHT  BUNDLE BRANCH BLOCK, NON-SPECIFIC T-WAVE CHANGES  STRESS HEART RESPONSE: NORMAL RESPONSE  BLOOD PRESSURE RESPONSE: APPROPRIATE  STRESS EKGs: NO CHANGES SEEN  CHEST DISCOMFORT: TRANSIENT CHEST DISCOMFORT  ISCHEMIC EKG CHANGES: NONE    EKG IMPRESSION: ELECTROCARDIOGRAPHICALLY NEGATIVE LEXISCAN STRESS TEST. RADIOISOTOPE RESULTS TO FOLLOW FROM THE DEPARTMENT OF NUCLEAR MEDICINE.     Kate Cheney    D: 2021 15:16:41       T: 2021 15:40:55     AS/AIME  Job#: [id number]     Doc#: Unknown    CC:    (Retain this field even if not dictated or not decipherable)

## 2021-09-03 NOTE — PROGRESS NOTES
CST Lexiscan. Stress Tech performs patient preparation of physical comfort, review test procedures, pre-stress EKG. Lung Sounds clear t/o. Consent verified by pt. .  Educated patient on test procedure and possible side effects of Lexiscan as well as s/s to report. Cardiologist reviewed pre-test EKG and is present for test.  Patient tolerated test well with minor SOB and sensation of feeling funny, which resolved to baseline after test with caffeine. Start 137/69, 56  Stop 140/65, 74  EKG portion of testing is completed and negative, nuc. med. portion is still pending.

## 2021-09-03 NOTE — CARE COORDINATION
is able to accept this patient. They will start pre-cert whenever the patient is able to participate in therapies.

## 2021-09-03 NOTE — PROGRESS NOTES
[facility]   Progress Note                   Date:   9/3/2021  Patient name: Brook Rosales  Date of admission:  9/1/2021 11:17 AM  MRN:   [id number]  YOB: 1940  PCP: JW David CNP    Reason for Admission: General weakness [R53.1]  Symptomatic bradycardia [R00.1]    Subjective:       Clinical Changes / Abnormalities: no dyspnea. Reports that he had right sided chest cramp at 2 AM which is resolved. He reports that this usually does not happen    HR 98 this am. BB held yesterday    Medications:   Scheduled Meds:   sodium chloride flush  5-40 mL IntraVENous 2 times per day    enoxaparin  40 mg SubCUTAneous Daily    atorvastatin  40 mg Oral Daily    allopurinol  100 mg Oral Daily    carbidopa-levodopa  1 tablet Oral 4x Daily    clopidogrel  75 mg Oral Daily    pantoprazole  40 mg Oral QAM AC    rOPINIRole  0.25 mg Oral TID    magnesium oxide  400 mg Oral Daily     Continuous Infusions:   sodium chloride      sodium chloride       CBC:   Recent Labs     09/01/21  1148 09/02/21  0518 09/03/21  0521   WBC 5.9 4.9 6.1   HGB 14.5 14.0 14.0    218 210     BMP:    Recent Labs     09/01/21  1148 09/02/21  0518 09/03/21  0521   * 139 135   K 4.8 4.7 5.1   CL 96* 101 99   CO2 26 28 28   BUN 13 16 19   CREATININE 1.36* 1.33* 1.60*   GLUCOSE 113* 109* 107*     Hepatic:   Recent Labs     09/01/21  1148   AST 22   ALT 29   BILITOT 0.30   ALKPHOS 119     Troponin: No results for input(s): TROPONINI in the last 72 hours. BNP: No results for input(s): BNP in the last 72 hours. Lipids: No results for input(s): CHOL, HDL in the last 72 hours. Invalid input(s): LDLCALCU  INR: No results for input(s): INR in the last 72 hours.     Objective:   Vitals: /65   Pulse 98   Temp 98.1 °F (36.7 °C) (Oral)   Resp 18   Ht 5' 7\" (1.702 m)   Wt 167 lb 15.9 oz (76.2 kg)   SpO2 98%   BMI 26.31 kg/m²   General appearance: alert and cooperative with exam  HEENT: Head: Normocephalic, no lesions, without obvious abnormality. Neck: no JVD  Lungs: CTAB  Heart: RRR s1+s2, no murmurs  Abdomen: soft, non-tender  Extremities: no edema  Neurologic: not done        Assessment / Acute Cardiac Problems:   1. Weakness/fatigue  2. Sinus bradycardia  3. CAD with PTCA/JENN to LAD in 2018  4. 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 CKD  6. HTN  7. HLP  8. SDH in 2011    Patient Active Problem List:     Temporary loss of eyesight     Impaired renal function     Syncope : posterior circulation stroke vs Neurocardiogenic syncope      Intracranial bleed : traumatic left sub-dural hematoma ,managed conservatively in 2011     Headache     CKD (chronic kidney disease) stage 3, GFR 30-59 ml/min ([facility])     Depression     Hyperlipidemia     Microhematuria     Microalbuminuria     Essential hypertension     Generalized abdominal pain     Tubular adenoma of colon     Diverticulosis of colon     History of skull fracture     NSTEMI (non-ST elevated myocardial infarction) ([facility])     Nausea     Pure hypercholesterolemia     Prediabetes     Parkinson disease ([facility])     Chronic post-traumatic headache, not intractable     Fall (on) (from) other stairs and steps, initial encounter     Strain of iliopsoas muscle, initial encounter     Acute pain of left knee     Closed fracture of second toe of right foot     Closed fracture of second toe of left foot     Abnormal ECG     Cerebrovascular accident ([address]     Chest pain, unspecified     Hematoma of scalp     Left ventricular hypertrophy     Mild aortic valve regurgitation     Pulmonary hypertension, mild ([facility])     Lumbar radiculopathy     Dizziness     Hyponatremia     Vomiting     General weakness     Overweight (BMI 25.0-29. 9)     Frequent falls     Symptomatic bradycardia      Plan of Treatment:   1. Continue plavix and statin  2.  Lexiscan stress test today for risk stratification    Noe Dickinson [facility] Cardiology  [phone]

## 2021-09-04 LAB
ANION GAP SERPL CALCULATED.3IONS-SCNC: 10 MMOL/L (ref 9–17)
BUN BLDV-MCNC: 18 MG/DL (ref 8–23)
BUN/CREAT BLD: ABNORMAL (ref 9–20)
CALCIUM SERPL-MCNC: 9.2 MG/DL (ref 8.6–10.4)
CHLORIDE BLD-SCNC: 97 MMOL/L (ref 98–107)
CO2: 27 MMOL/L (ref 20–31)
CREAT SERPL-MCNC: 1.29 MG/DL (ref 0.7–1.2)
GFR AFRICAN AMERICAN: >60 ML/MIN
GFR NON-AFRICAN AMERICAN: 53 ML/MIN
GFR SERPL CREATININE-BSD FRML MDRD: ABNORMAL ML/MIN/{1.73_M2}
GFR SERPL CREATININE-BSD FRML MDRD: ABNORMAL ML/MIN/{1.73_M2}
GLUCOSE BLD-MCNC: 113 MG/DL (ref 70–99)
HCT VFR BLD CALC: 45 % (ref 41–53)
HEMOGLOBIN: 14.6 G/DL (ref 13.5–17.5)
MCH RBC QN AUTO: 31.1 PG (ref 26–34)
MCHC RBC AUTO-ENTMCNC: 32.4 G/DL (ref 31–37)
MCV RBC AUTO: 96.1 FL (ref 80–100)
NRBC AUTOMATED: ABNORMAL PER 100 WBC
PDW BLD-RTO: 15.5 % (ref 11.5–14.9)
PLATELET # BLD: 163 K/UL (ref 150–450)
PMV BLD AUTO: 7.4 FL (ref 6–12)
POTASSIUM SERPL-SCNC: 5 MMOL/L (ref 3.7–5.3)
RBC # BLD: 4.69 M/UL (ref 4.5–5.9)
SODIUM BLD-SCNC: 134 MMOL/L (ref 135–144)
WBC # BLD: 5.4 K/UL (ref 3.5–11)

## 2021-09-04 PROCEDURE — 6360000002 HC RX W HCPCS: Performed by: FAMILY MEDICINE

## 2021-09-04 PROCEDURE — 85027 COMPLETE CBC AUTOMATED: CPT

## 2021-09-04 PROCEDURE — 80048 BASIC METABOLIC PNL TOTAL CA: CPT

## 2021-09-04 PROCEDURE — 6370000000 HC RX 637 (ALT 250 FOR IP): Performed by: FAMILY MEDICINE

## 2021-09-04 PROCEDURE — 97530 THERAPEUTIC ACTIVITIES: CPT

## 2021-09-04 PROCEDURE — 2580000003 HC RX 258: Performed by: FAMILY MEDICINE

## 2021-09-04 PROCEDURE — 36415 COLL VENOUS BLD VENIPUNCTURE: CPT

## 2021-09-04 PROCEDURE — 99232 SBSQ HOSP IP/OBS MODERATE 35: CPT | Performed by: FAMILY MEDICINE

## 2021-09-04 PROCEDURE — 97166 OT EVAL MOD COMPLEX 45 MIN: CPT

## 2021-09-04 PROCEDURE — 97161 PT EVAL LOW COMPLEX 20 MIN: CPT

## 2021-09-04 PROCEDURE — 2060000000 HC ICU INTERMEDIATE R&B

## 2021-09-04 PROCEDURE — 96372 THER/PROPH/DIAG INJ SC/IM: CPT

## 2021-09-04 RX ADMIN — PANTOPRAZOLE SODIUM 40 MG: 40 TABLET, DELAYED RELEASE ORAL at 05:44

## 2021-09-04 RX ADMIN — MAGNESIUM OXIDE TAB 400 MG (241.3 MG ELEMENTAL MG) 400 MG: 400 (241.3 MG) TAB at 09:32

## 2021-09-04 RX ADMIN — ROPINIROLE HYDROCHLORIDE 0.25 MG: 0.5 TABLET, FILM COATED ORAL at 17:17

## 2021-09-04 RX ADMIN — ALLOPURINOL 100 MG: 100 TABLET ORAL at 09:32

## 2021-09-04 RX ADMIN — ROPINIROLE HYDROCHLORIDE 0.25 MG: 0.5 TABLET, FILM COATED ORAL at 09:33

## 2021-09-04 RX ADMIN — CARBIDOPA AND LEVODOPA 1 TABLET: 25; 100 TABLET ORAL at 12:32

## 2021-09-04 RX ADMIN — SODIUM CHLORIDE, PRESERVATIVE FREE 10 ML: 5 INJECTION INTRAVENOUS at 09:37

## 2021-09-04 RX ADMIN — ATORVASTATIN CALCIUM 40 MG: 40 TABLET, FILM COATED ORAL at 09:32

## 2021-09-04 RX ADMIN — ROPINIROLE HYDROCHLORIDE 0.25 MG: 0.5 TABLET, FILM COATED ORAL at 21:03

## 2021-09-04 RX ADMIN — CLOPIDOGREL BISULFATE 75 MG: 75 TABLET ORAL at 09:33

## 2021-09-04 RX ADMIN — CARBIDOPA AND LEVODOPA 1 TABLET: 25; 100 TABLET ORAL at 09:35

## 2021-09-04 RX ADMIN — SODIUM CHLORIDE, PRESERVATIVE FREE 10 ML: 5 INJECTION INTRAVENOUS at 21:03

## 2021-09-04 RX ADMIN — CARBIDOPA AND LEVODOPA 1 TABLET: 25; 100 TABLET ORAL at 17:20

## 2021-09-04 RX ADMIN — CARBIDOPA AND LEVODOPA 1 TABLET: 25; 100 TABLET ORAL at 21:03

## 2021-09-04 RX ADMIN — ATORVASTATIN CALCIUM 40 MG: 40 TABLET, FILM COATED ORAL at 21:03

## 2021-09-04 RX ADMIN — BUTALBITA,ACETAMINOPHEN AND CAFFEINE 1 CAPSULE: 50; 300; 40 CAPSULE ORAL at 04:53

## 2021-09-04 RX ADMIN — ENOXAPARIN SODIUM 40 MG: 40 INJECTION SUBCUTANEOUS at 12:31

## 2021-09-04 ASSESSMENT — PAIN SCALES - GENERAL
PAINLEVEL_OUTOF10: 4
PAINLEVEL_OUTOF10: 6
PAINLEVEL_OUTOF10: 5
PAINLEVEL_OUTOF10: 6

## 2021-09-04 ASSESSMENT — PAIN DESCRIPTION - PAIN TYPE
TYPE: ACUTE PAIN
TYPE: ACUTE PAIN

## 2021-09-04 ASSESSMENT — PAIN DESCRIPTION - LOCATION
LOCATION: HEAD
LOCATION: HEAD

## 2021-09-04 ASSESSMENT — PAIN DESCRIPTION - DESCRIPTORS: DESCRIPTORS: ACHING

## 2021-09-04 NOTE — DISCHARGE INSTR - COC
Continuity of Care Form    Patient Name: Juan Antonio Mccormack   :  1940  MRN:  [id number]    Admit date:  2021  Discharge date:  2021    Code Status Order: Full Code   Advance Directives:      Admitting Physician:  Joe Jones MD  PCP: JW Henley CNP    Discharging Nurse: DARLENE [facility] Unit/Room#: 1129/4571-81  Discharging Unit Phone Number: [phone]    Emergency Contact:   Extended Emergency Contact Information  Primary Emergency Contact: Morales Pierce  Address: n/a   Ben Chirinos [address] Phone: [phone]  Relation: Child  Secondary Emergency Contact: Miguel Espinoza  Address: Carri Tineo [address] Phone: [phone]  Mobile Phone: [phone]  Relation: Other    Past Surgical History:  Past Surgical History:   Procedure Laterality Date    COLONOSCOPY  2012    poor prep, tubular adenoma    COLONOSCOPY  2017    diverticulosis, multiple polyps as described, spot marking done, pathology-tubular adenoma x 4    DE COLSC FLX W/RMVL OF TUMOR POLYP LESION SNARE TQ N/A 2017    COLONOSCOPY POLYPECTOMY SNARE/COLD BIOPSY with tatooing and apc transverse colon performed by Kirsty Zamudio MD at [address] Right     rotator cuff       Immunization History:   Immunization History   Administered Date(s) Administered    COVID-19, Pfizer, PF, 30mcg/0.3mL 2021, 2021    Influenza 2012    Influenza, High Dose (Fluzone 65 yrs and older) 2014, 2016, 2017, 2017    Influenza, Quadv, adjuvanted, 65 yrs +, IM, PF (Fluad) 10/19/2020    Influenza, Triv, inactivated, subunit, adjuvanted, IM (Fluad 65 yrs and older) 2018, 10/06/2019    Pneumococcal Conjugate 13-valent (Yuliana Santoyo) 2017, 2018, 2018    Pneumococcal Polysaccharide ([id number]) 2012, 2015       Active Problems:  Patient Active Problem List   Diagnosis Code    Temporary loss of eyesight H53.129    Impaired renal function N28.9    Syncope : posterior circulation stroke vs Neurocardiogenic syncope  R55    Intracranial bleed : traumatic left sub-dural hematoma ,managed conservatively in 2011 I62.9    Headache R51.9    CKD (chronic kidney disease) stage 3, GFR 30-59 ml/min ([facility]) N18.30    Depression F32.9    Hyperlipidemia E78.5    Microhematuria R31.29    Microalbuminuria R80.9    Essential hypertension I10    Generalized abdominal pain R10.84    Tubular adenoma of colon D12.6    Diverticulosis of colon K57.30    History of skull fracture Z87.81    NSTEMI (non-ST elevated myocardial infarction) ([facility]) I21.4    Nausea R11.0    Pure hypercholesterolemia E78.00    Prediabetes R73.03    Parkinson disease ([facility]) G20    Chronic post-traumatic headache, not intractable G44.329    Fall (on) (from) other stairs and steps, initial encounter W10. 8XXA    Strain of iliopsoas muscle, initial encounter S76.919A    Acute pain of left knee M25.562    Closed fracture of second toe of right foot S92.501A    Closed fracture of second toe of left foot S92.502A    Abnormal ECG R94.31    Cerebrovascular accident ([facility] [address] I63.9    Chest pain, unspecified R07.9    Hematoma of scalp S00. 03XA    Left ventricular hypertrophy I51.7    Mild aortic valve regurgitation I35.1    Pulmonary hypertension, mild ([facility]) I27.20    Lumbar radiculopathy M54.16    Dizziness R42    Hyponatremia E87.1    Vomiting R11.10    General weakness R53.1    Overweight (BMI 25.0-29. 9) E66.3    Frequent falls R29.6    Symptomatic bradycardia R00.1       Isolation/Infection:   Isolation            Contact          Patient Infection Status       Infection Onset Added Last Indicated Last Indicated By Review Planned Expiration Resolved Resolved By    MRSA  09/25/15 09/25/15 Yanet Vidal RN        Leg 9/23/15    Resolved    COVID-19 Rule Out 09/01/21 09/01/21 09/01/21 COVID-19, Rapid (Ordered)   09/01/21 Rule-Out Test Resulted            Nurse Assessment:  Last Vital Signs: /77   Pulse 71   Temp 97.9 °F (36.6 °C) (Oral)   Resp 16   Ht 5' 7\" (1.702 m)   Wt 167 lb 15.9 oz (76.2 kg)   SpO2 96%   BMI 26.31 kg/m²     Last documented pain score (0-10 scale): Pain Level: 6  Last Weight:   Wt Readings from Last 1 Encounters:   09/03/21 167 lb 15.9 oz (76.2 kg)     Mental Status:  oriented and alert    IV Access:  - None    Nursing Mobility/ADLs:  Walking   Independent  Transfer  Independent  Bathing  Independent  Dressing  Independent  Toileting  Independent  Feeding  Independent  Med Admin  Independent  Med Delivery   whole    Wound Care Documentation and Therapy:        Elimination:  Continence:   · Bowel: Yes  · Bladder: Yes  Urinary Catheter: None   Colostomy/Ileostomy/Ileal Conduit: No       Date of Last BM: 9/7/2021    Intake/Output Summary (Last 24 hours) at 9/4/2021 1603  Last data filed at 9/4/2021 1535  Gross per 24 hour   Intake 260 ml   Output 1850 ml   Net -1590 ml     I/O last 3 completed shifts: In: 260 [P.O.:260]  Out: 1600 [Urine:1600]    Safety Concerns: At Risk for Falls    Impairments/Disabilities:      Vision    Nutrition Therapy:  Current Nutrition Therapy:   - Oral Diet:  General    Routes of Feeding: Oral  Liquids: Thin Liquids  Daily Fluid Restriction: no  Last Modified Barium Swallow with Video (Video Swallowing Test): not done    Treatments at the Time of Hospital Discharge:   Respiratory Treatments: none  Oxygen Therapy:  is not on home oxygen therapy.   Ventilator:    - No ventilator support    Rehab Therapies: Physical Therapy and Occupational Therapy  Weight Bearing Status/Restrictions: No weight bearing restirctions  Other Medical Equipment (for information only, NOT a DME order):  none  Other Treatments: none    Patient's personal belongings (please select all that are sent with patient):  Mendy    RN SIGNATURE:  Electronically signed by Sri Bran RN on 9/7/21 at 4:30 PM EDT    CASE MANAGEMENT/SOCIAL WORK SECTION    Inpatient Status Date: 9/1/2021    Readmission Risk Assessment Score:  Readmission Risk              Risk of Unplanned Readmission:  19           Discharging to Facility/ Agency   · Name: [facility]  · Address:  · Phone:[phone]  Admissions Marion Polo [phone] and report [phone]  · Fax:[phone]    / signature: Electronically signed by JEFFREY Kilgore, PRASHANTHW on 9/4/21 at 4:18 PM EDT    PHYSICIAN SECTION    Prognosis: Fair    Condition at Discharge: Stable    Rehab Potential (if transferring to Rehab): Fair    Recommended Labs or Other Treatments After Discharge:     Physician Certification: I certify the above information and transfer of Brook Loco  is necessary for the continuing treatment of the diagnosis listed and that he requires [facility] for greater 30 days.      Update Admission H&P: No change in H&P    PHYSICIAN SIGNATURE:  Electronically signed by Olive Longoria MD on 9/4/21 at 7:47 PM EDT

## 2021-09-04 NOTE — PLAN OF CARE
Problem: Infection:  Goal: Will remain free from infection  Description: Will remain free from infection  9/4/2021 1648 by Libertad Meade RN  Outcome: Ongoing  9/4/2021 0253 by Thomas Spangler RN  Outcome: Met This Shift     Problem: Safety:  Goal: Free from accidental physical injury  Description: Free from accidental physical injury  9/4/2021 1648 by Libertad Meade RN  Outcome: Ongoing  9/4/2021 0253 by Thomas Spangler RN  Outcome: Ongoing  Goal: Free from intentional harm  Description: Free from intentional harm  Outcome: Ongoing     Problem: Daily Care:  Goal: Daily care needs are met  Description: Daily care needs are met  9/4/2021 1648 by Libertad Meade RN  Outcome: Ongoing  9/4/2021 0253 by Thomas Spangler RN  Outcome: Ongoing     Problem: Pain:  Goal: Patient's pain/discomfort is manageable  Description: Patient's pain/discomfort is manageable  9/4/2021 1648 by Libertad Meade RN  Outcome: Ongoing  9/4/2021 0253 by Thomas Spangler RN  Outcome: Met This Shift  Goal: Pain level will decrease  Description: Pain level will decrease  Outcome: Ongoing  Goal: Control of acute pain  Description: Control of acute pain  Outcome: Ongoing  Goal: Control of chronic pain  Description: Control of chronic pain  Outcome: Ongoing     Problem: Skin Integrity:  Goal: Skin integrity will stabilize  Description: Skin integrity will stabilize  9/4/2021 1648 by Libertad Meade RN  Outcome: Ongoing  9/4/2021 0253 by Thomas Spangler RN  Outcome: Ongoing     Problem: Falls - Risk of:  Goal: Will remain free from falls  Description: Will remain free from falls  9/4/2021 1648 by Libertad Meade RN  Outcome: Ongoing  9/4/2021 0253 by Thomas Spangler RN  Outcome: Ongoing  Goal: Absence of physical injury  Description: Absence of physical injury  Outcome: Ongoing

## 2021-09-04 NOTE — PLAN OF CARE
Problem: Infection:  Goal: Will remain free from infection  Description: Will remain free from infection  9/4/2021 1650 by Claudetta Batter, RN  Outcome: Ongoing  9/4/2021 1648 by Claudetta Batter, RN  Outcome: Ongoing  9/4/2021 0253 by Ambar Nuñez RN  Outcome: Met This Shift     Problem: Safety:  Goal: Free from accidental physical injury  Description: Free from accidental physical injury  9/4/2021 1650 by Claudetta Batter, RN  Outcome: Ongoing  9/4/2021 1648 by Claudetta Batter, RN  Outcome: Ongoing  9/4/2021 0253 by Ambar Nuñez RN  Outcome: Ongoing  Goal: Free from intentional harm  Description: Free from intentional harm  9/4/2021 1650 by Claudetta Batter, RN  Outcome: Ongoing  9/4/2021 1648 by Claudetta Batter, RN  Outcome: Ongoing     Problem: Daily Care:  Goal: Daily care needs are met  Description: Daily care needs are met  9/4/2021 1650 by Claudetta Batter, RN  Outcome: Ongoing  9/4/2021 1648 by Claudetta Batter, RN  Outcome: Ongoing  9/4/2021 0253 by Ambar Nuñez RN  Outcome: Ongoing     Problem: Pain:  Goal: Patient's pain/discomfort is manageable  Description: Patient's pain/discomfort is manageable  9/4/2021 1650 by Claudetta Batter, RN  Outcome: Ongoing  9/4/2021 1648 by Claudetta Batter, RN  Outcome: Ongoing  9/4/2021 0253 by Ambar Nuñez RN  Outcome: Met This Shift  Goal: Pain level will decrease  Description: Pain level will decrease  9/4/2021 1650 by Claudetta Batter, RN  Outcome: Ongoing  9/4/2021 1648 by Claudetta Batter, RN  Outcome: Ongoing  Goal: Control of acute pain  Description: Control of acute pain  9/4/2021 1650 by Claudetta Batter, RN  Outcome: Ongoing  9/4/2021 1648 by Claudetta Batter, RN  Outcome: Ongoing  Goal: Control of chronic pain  Description: Control of chronic pain  9/4/2021 1650 by Claudetta Batter, RN  Outcome: Ongoing  9/4/2021 1648 by Claudetta Batter, RN  Outcome: Ongoing     Problem: Skin Integrity:  Goal: Skin integrity will stabilize  Description: Skin integrity will stabilize  9/4/2021 1650 by Pradeep Bolden RN  Outcome: Ongoing  9/4/2021 1648 by Pradeep Bolden RN  Outcome: Ongoing  9/4/2021 0253 by Rosina Franklin RN  Outcome: Ongoing     Problem: Falls - Risk of:  Goal: Will remain free from falls  Description: Will remain free from falls  9/4/2021 1650 by Pradeep Bolden RN  Outcome: Ongoing  9/4/2021 1648 by Pradeep Bolden RN  Outcome: Ongoing  9/4/2021 0253 by Rosina Franklin RN  Outcome: Ongoing  Goal: Absence of physical injury  Description: Absence of physical injury  9/4/2021 1650 by Pradeep Bolden RN  Outcome: Ongoing  9/4/2021 1648 by Pradeep Bolden RN  Outcome: Ongoing

## 2021-09-04 NOTE — PROGRESS NOTES
FAMILY MEDICINE  - PROGRESS NOTE    Date:  9/4/2021  Lauren England  [id number]      Chief Complaint   Patient presents with    Nausea    Fatigue    Generalized Body Aches         Interval History:  not changed much, his heart rate is better without the Metoprolol and his BP is controlled. His stress test was negative. He denies any chest pain. Specialists notes, labs & imaging reviewed.       Subjective  Constitutional: positive for fatigue and overweight  Eyes: positive for contacts/glasses  Ears, nose, mouth, throat, and face: negative  Respiratory: negative  Cardiovascular: negative  Gastrointestinal: positive for reflux symptoms and diverticulosis  Hematologic/lymphatic: negative  Musculoskeletal:positive for arthralgias, muscle weakness, myalgias and stiff joints  Neurological: positive for gait problems, headaches, tremors and weakness  Behavioral/Psych: positive for depression:    Objective:    /81   Pulse 52   Temp 97.7 °F (36.5 °C) (Axillary)   Resp 16   Ht 5' 7\" (1.702 m)   Wt 167 lb 15.9 oz (76.2 kg)   SpO2 98%   BMI 26.31 kg/m²   General appearance - alert, well appearing, and in no distress and overweight  Mental status - alert, oriented to person, place, and time  Eyes - pupils equal and reactive, extraocular eye movements intact  Ears - hearing grossly normal bilaterally  Nose - normal and patent, no erythema, discharge or polyps  Mouth - mucous membranes moist, pharynx normal without lesions  Neck - supple, no significant adenopathy  Lymphatics - no palpable lymphadenopathy, no hepatosplenomegaly  Chest - clear to auscultation, no wheezes, rales or rhonchi, symmetric air entry  Heart - bradycardic rate, regular rhythm, normal S1, S2, no murmurs, rubs, clicks or gallops  Abdomen - soft, nontender, nondistended, no masses or organomegaly  Breasts - not examined  Back exam - not examined  Neurological - neck supple without rigidity, abnormal neurological exam unchanged from prior examinations  Musculoskeletal - no joint tenderness, deformity or swelling  Extremities - peripheral pulses normal, no pedal edema, no clubbing or cyanosis  Skin - normal coloration and turgor, no rashes, no suspicious skin lesions noted    Data:   Medications:   Current Facility-Administered Medications   Medication Dose Route Frequency Provider Last Rate Last Admin    sodium chloride flush 0.9 % injection 10 mL  10 mL IntraVENous PRN Coral Atkins MD   10 mL at 09/03/21 0721    sodium chloride flush 0.9 % injection 5-40 mL  5-40 mL IntraVENous 2 times per day Too Abreu MD   10 mL at 09/03/21 2010    sodium chloride flush 0.9 % injection 5-40 mL  5-40 mL IntraVENous PRN Too Abreu MD        0.9 % sodium chloride infusion  25 mL IntraVENous PRN Too Abreu MD        enoxaparin (LOVENOX) injection 40 mg  40 mg SubCUTAneous Daily Too Abreu MD   40 mg at 09/03/21 1754    ondansetron (ZOFRAN-ODT) disintegrating tablet 4 mg  4 mg Oral Q8H PRN Too Abreu MD        Or    ondansetron [facility]) injection 4 mg  4 mg IntraVENous Q6H PRN Too Abreu MD        polyethylene glycol (GLYCOLAX) packet 17 g  17 g Oral Daily PRN Too Abreu MD        acetaminophen (TYLENOL) tablet 650 mg  650 mg Oral Q6H PRN Too Abreu MD   650 mg at 09/03/21 2357    Or    acetaminophen (TYLENOL) suppository 650 mg  650 mg Rectal Q6H PRN Too Abreu MD        nitroGLYCERIN (NITROSTAT) SL tablet 0.4 mg  0.4 mg SubLINGual Q5 Min PRN Too Abreu MD        atorvastatin (LIPITOR) tablet 40 mg  40 mg Oral Daily Too Abreu MD   40 mg at 09/03/21 2009    allopurinol (ZYLOPRIM) tablet 100 mg  100 mg Oral Daily Too Abreu MD   100 mg at 09/03/21 1239    butalbital-APAP-caffeine -40 MG per capsule 1 capsule  1 capsule Oral Q6H PRN Too Abreu MD   1 capsule at 09/04/21 0453    carbidopa-levodopa (SINEMET)  MG per tablet 1 tablet  1 tablet Oral 4x Daily Sharda Dunbar MD   1 tablet at 09/03/21 2009    clopidogrel (PLAVIX) tablet 75 mg  75 mg Oral Daily Sharda Dunbar MD   75 mg at 09/03/21 1238    pantoprazole (PROTONIX) tablet 40 mg  40 mg Oral QAM AC Sharda Dunbar MD   40 mg at 09/04/21 0544    rOPINIRole (REQUIP) tablet 0.25 mg  0.25 mg Oral TID Sharda Dunbar MD   0.25 mg at 09/03/21 2009    magnesium oxide (MAG-OX) tablet 400 mg  400 mg Oral Daily Sharda Dunbar MD   400 mg at 09/03/21 1238       Intake/Output Summary (Last 24 hours) at 9/4/2021 0826  Last data filed at 9/4/2021 0448  Gross per 24 hour   Intake 260 ml   Output 1375 ml   Net -1115 ml     Recent Results (from the past 24 hour(s))   CBC    Collection Time: 09/04/21  6:30 AM   Result Value Ref Range    WBC 5.4 3.5 - 11.0 k/uL    RBC 4.69 4.5 - 5.9 m/uL    Hemoglobin 14.6 13.5 - 17.5 g/dL    Hematocrit 45.0 41 - 53 %    MCV 96.1 80 - 100 fL    MCH 31.1 26 - 34 pg    MCHC 32.4 31 - 37 g/dL    RDW 15.5 (H) 11.5 - 14.9 %    Platelets 715 409 - 042 k/uL    MPV 7.4 6.0 - 12.0 fL    NRBC Automated NOT REPORTED per 100 WBC   Basic Metabolic Panel    Collection Time: 09/04/21  6:30 AM   Result Value Ref Range    Glucose 113 (H) 70 - 99 mg/dL    BUN 18 8 - 23 mg/dL    CREATININE 1.29 (H) 0.70 - 1.20 mg/dL    Bun/Cre Ratio NOT REPORTED 9 - 20    Calcium 9.2 8.6 - 10.4 mg/dL    Sodium 134 (L) 135 - 144 mmol/L    Potassium 5.0 3.7 - 5.3 mmol/L    Chloride 97 (L) 98 - 107 mmol/L    CO2 27 20 - 31 mmol/L    Anion Gap 10 9 - 17 mmol/L    GFR Non-African American 53 (L) >60 mL/min    GFR African American >60 >60 mL/min    GFR Comment          GFR Staging NOT REPORTED      -----------------------------------------------------------------  RAD:  EKG:  Micro:     Assessment & Plan:    Patient Active Problem List:     Temporary loss of eyesight     Impaired renal function     Syncope : posterior circulation stroke vs Neurocardiogenic syncope      Intracranial bleed : traumatic left sub-dural hematoma ,managed conservatively in 2011     Headache     CKD (chronic kidney disease) stage 3, GFR 30-59 ml/min ([facility])     Depression     Hyperlipidemia     Microhematuria     Microalbuminuria     Essential hypertension     Generalized abdominal pain     Tubular adenoma of colon     Diverticulosis of colon     History of skull fracture     NSTEMI (non-ST elevated myocardial infarction) ([facility])     Nausea     Pure hypercholesterolemia     Prediabetes     Parkinson disease ([facility])     Chronic post-traumatic headache, not intractable     Fall (on) (from) other stairs and steps, initial encounter     Strain of iliopsoas muscle, initial encounter     Acute pain of left knee     Closed fracture of second toe of right foot     Closed fracture of second toe of left foot     Abnormal ECG     Cerebrovascular accident ([facility] [address]     Chest pain, unspecified     Hematoma of scalp     Left ventricular hypertrophy     Mild aortic valve regurgitation     Pulmonary hypertension, mild ([facility])     Lumbar radiculopathy     Dizziness     Hyponatremia     Vomiting     General weakness     Overweight (BMI 25.0-29. 9)     Frequent falls     Symptomatic bradycardia           Plan:  -Symptomatic bradycardia - improved off of Metoprolol, stress test negative, Cardiology following and recommends outpatient Holter monitor.  -Parkinson's disease - unchanged, on Sinemet & Requip.  -Generalized weakness - PT/OT eval & treat,  following due to patient wanting SNF for rehab.  -Continue current treatments.  -Complete orders per chart.     See orders   Disposition:    Electronically signed by Lashawn Gamez MD on 9/4/2021 at 8:26 AM

## 2021-09-04 NOTE — PROGRESS NOTES
[facility]: YOVANNY VALE   Occupational Therapy Evaluation  Date: 21  Patient Name: Tereza Brantley       Room: 9841/5818-85  MRN: [id number]  Account: [de-identified]   : 1940  (79 y.o.) Gender: male     Discharge Recommendations:  Further Occupational Therapy is recommended upon facility discharge. Equipment Needed:  (TBD)    Referring Practitioner: Martha Comer MD  Diagnosis: General weakness       Treatment Diagnosis: Impaired self care status  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 ([address] CKD (chronic kidney disease) stage 2, GFR 60-89 ml/min, CKD (chronic kidney disease) stage 3, GFR 30-59 ml/min (HCC), Diverticulosis of colon, GERD (gastroesophageal reflux disease), Impaired renal function, MRSA (methicillin resistant staph aureus) culture positive, Osteoarthritis of knee, Parkinson disease ([address] Proteinuria, Skull fracture ([address] Temporary loss of eyesight, and Tubular adenoma of colon. Past Surgical History:   has a past surgical history that includes Colonoscopy (2012); shoulder surgery (Right); pr colsc flx w/rmvl of tumor polyp lesion snare tq (N/A, 2017); and Colonoscopy (2017).     Restrictions  Restrictions/Precautions: Fall Risk  Implants present? :  (stent in kidney)  Required Braces or Orthoses?: No     Vitals  Temp: 97.9 °F (36.6 °C)  Pulse: 71  Resp: 16  BP: 112/77  Height: 5' 7\" (170.2 cm)  Weight: 167 lb 15.9 oz (76.2 kg)  BMI (Calculated): 26.4  Oxygen Therapy  SpO2: 96 %  Pulse Oximeter Device Mode: Intermittent  Pulse Oximeter Device Location: Finger  O2 Device: None (Room air)  Level of Consciousness: Alert (0)    Subjective  Subjective: Pt resting in bed upon arrival. Pt was pleasant and agreeable to OT/PT eval   Comments: Ok per [employer] for OT/PT eval  Overall Orientation Status: Within Functional Limits  Vision  Vision: Impaired  Vision Exceptions: Wears glasses at all times  Hearing  Hearing: Within functional limits  Social/Functional History  Lives With: Alone  Type of Home: House  Home Layout: Two level, Able to Live on Main level with bedroom/bathroom  Home Access: Stairs to enter with rails  Entrance Stairs - Number of Steps: 4-5  Entrance Stairs - Rails: Both  Bathroom Shower/Tub: Tub/Shower unit, Curtain, Shower chair with back  Bathroom Toilet: Standard  Home Equipment: [place], Rolling walker  ADL Assistance: Independent  Homemaking Assistance: Independent  Homemaking Responsibilities: Yes  Ambulation Assistance: Independent (cane)  Transfer Assistance: Independent  Active : Yes  Mode of Transportation: [employer]  Occupation: Retired  IADL Comments: Pt reports sleeping in flat bed  Additional Comments: Pt reports that he has a cousin that lives in [place] that has been helping him out when able. Pt reports no other support. Pain Assessment  Pain Assessment: 0-10  Pain Level: 6  Pain Type: Acute pain  Pain Location: Head    Objective          Sensation  Overall Sensation Status: Impaired (Intermittent in Bilateral hands)   ADL  Feeding: Setup  Grooming: Setup  UE Bathing: Stand by assistance  LE Bathing: Contact guard assistance  UE Dressing: Stand by assistance  LE Dressing: Contact guard assistance  Toileting: Contact guard assistance  Additional Comments: ADL scores based on clinical reaonsing and skilled observation unless otherwise noted. Pt able to adjust socks while sitting EOB utilizing figure 4 technique.      UE Function           LUE Strength  L Hand General: 4/5     LUE Tone: Normotonic     LUE AROM (degrees)  LUE AROM : WFL     Left Hand AROM (degrees)  Left Hand AROM: WFL  RUE Strength  R Hand General: 4/5      RUE Tone: Normotonic     RUE AROM (degrees)  RUE AROM : WFL     Right Hand AROM (degrees)  Right Hand AROM: WFL    Fine Motor Skills  Coordination  Movements Are Fluid And Coordinated: No  Coordination and Movement description: Tremors, Decreased speed, Decreased accuracy, Right UE, Left UE                           Mobility  Supine to Sit: Stand by assistance  Sit to Supine: Stand by assistance       Balance  Sitting Balance: Stand by assistance  Standing Balance: Contact guard assistance  Standing Balance  Time: 1-2 minutes  Activity: functional mobility  Comment: with cane. Pt demonstrated slight posterior lean when initially standing  Functional Mobility  Functional - Mobility Device: Cane  Activity: Other (to/from door)  Assist Level: Contact guard assistance  Functional Mobility Comments: Verbal cues for safety. shuffled gait  Bed mobility  Supine to Sit: Stand by assistance  Sit to Supine: Stand by assistance  Scooting: Stand by assistance     Transfers  Sit to stand: Contact guard assistance  Stand to sit: Contact guard assistance  Transfer Comments: Verbal cues for safety with slight posterior lean initially when standing  Functional Activity Tolerance  Functional Activity Tolerance:  Tolerates 30 min exercise with multiple rests   Assessment  Performance deficits / Impairments: Decreased ADL status, Decreased functional mobility , Decreased strength, Decreased safe awareness, Decreased endurance, Decreased balance, Decreased high-level IADLs  Treatment Diagnosis: Impaired self care status  Prognosis: Good  Decision Making: Medium Complexity  REQUIRES OT FOLLOW UP: Yes  Discharge Recommendations: Patient would benefit from continued therapy after discharge  Activity Tolerance: Patient Tolerated treatment well         Functional Outcome Measures  AM-PAC Daily Activity Inpatient   How much help for putting on and taking off regular lower body clothing?: A Little  How much help for Bathing?: A Little  How much help for Toileting?: A Little  How much help for putting on and taking off regular upper body clothing?: A Little  How much help for taking care of personal grooming?: A Little  How much help for eating meals?: A Little  AM-[facility] Inpatient Daily Activity Raw Score: 18  AM-PAC Inpatient ADL T-Scale Score : 38.66  ADL Inpatient CMS 0-100% Score: 46.65  ADL Inpatient CMS G-Code Modifier : CK       Goals  Short term goals  Time Frame for Short term goals: By discharge  Short term goal 1: Pt will complete lower body dressing/bathing with supervision and good safety  Short term goal 2: Pt will complete functional transfers/mobility during self care tasks with SBA and good safety  Short term goal 3: Pt will tolerate standing 5+ minutes during funcitonal activity of choice with SBA and good safety  Short term goal 4: Pt will verbalize/demonstrate good understanding of fall prevention strategies to increase safety and independence with self care tasks  Short term goal 5: Pt will participate in 15+ minutes of therapeutic exercises/functional activities to increase safety and independence with self care tasks    Plan  Safety Devices  Safety Devices in place: Yes  Type of devices:  All fall risk precautions in place, Bed alarm in place, Chair alarm in place, Gait belt, Patient at risk for falls, Left in bed, Nurse notified     Plan  Times per week: 4-6  Current Treatment Recommendations: Self-Care / ADL, Strengthening, ROM, Balance Training, Functional Mobility Training, Endurance Training, Safety Education & Training, Patient/Caregiver Education & Training, Equipment Evaluation, Education, & procurement, Home Management Training       Equipment Recommendations  Equipment Needed:  (TBD)  OT Individual Minutes  Time In: 9191  Time Out: 1400  Minutes: 26    Electronically signed by Nathan Pink OT on 9/4/21 at 5:09 PM EDT

## 2021-09-04 NOTE — PROGRESS NOTES
Screening  Patient Currently in Pain: Yes  Pain Assessment  Pain Assessment: 0-10  Pain Level: 6  Pain Type: Acute pain  Pain Location: Head  Pain Descriptors: Aching  Vital Signs  Patient Currently in Pain: Yes       Orientation  Orientation  Overall Orientation Status: Within Functional Limits  Social/Functional History  Social/Functional History  Lives With: Alone  Type of Home: House  Home Layout: Two level, Able to Live on Main level with bedroom/bathroom  Home Access: Stairs to enter with rails  Entrance Stairs - Number of Steps: 4-5  Entrance Stairs - Rails: Both  Bathroom Shower/Tub: Tub/Shower unit, Curtain, Shower chair with back  Bathroom Toilet: Standard  Home Equipment: [place], Rolling walker  ADL Assistance: Independent  Homemaking Assistance: Independent  Homemaking Responsibilities: Yes  Ambulation Assistance: Independent (cane)  Transfer Assistance: Independent  Active : Yes  Mode of Transportation: SUV  Occupation: Retired  IADL Comments: Pt reports sleeping in flat bed  Additional Comments: Pt reports that he has a cousin that lives in [place] that has been helping him out when able. Pt reports no other support.      Objective     AROM RLE (degrees)  RLE AROM: WFL  AROM LLE (degrees)  LLE AROM : WFL  Strength RLE  Comment: grossly 4/5  Strength LLE  Comment: grossly 4/5        Bed mobility  Supine to Sit: Stand by assistance  Sit to Supine: Stand by assistance  Scooting: Stand by assistance  Transfers  Sit to Stand: Contact guard assistance  Stand to sit: Contact guard assistance  Comment: slight posterior lean when initially standing  Ambulation  Ambulation?: Yes  Ambulation 1  Surface: level tile  Device: Single point cane  Assistance: Contact guard assistance  Gait Deviations: Shuffles;Decreased step length;Decreased step height  Distance: 20ft  Comments: Parkinsons gait  Stairs/Curb  Stairs?: No     Balance  Sitting - Static: Fair;+ (SBA)  Sitting - Dynamic: Fair (CGA)  Standing - Static: Fair (CGA)  Standing - Dynamic: Fair (CGA)        Plan   Plan  Times per week: 5-6x/wk  Current Treatment Recommendations: Balance Training, Functional Mobility Training, Gait Training, Stair training, Endurance Training  Safety Devices  Type of devices: Call light within reach, Bed alarm in place, Left in bed, Patient at risk for falls      Goals  Short term goals  Time Frame for Short term goals: 3-5 days  Short term goal 1: mod-I transfers  Short term goal 2: mod-I gait with cane x 100ft  Short term goal 3: negotiate 5 steps with rail/cane mod-I       Therapy Time   Individual Concurrent Group Co-treatment   Time In 1334         Time Out 1400         Minutes [id number] [facility],

## 2021-09-04 NOTE — CARE COORDINATION
Social work; Faxed first PT note to [facility] in order to enable them to start precert. Pt has had covid vaccine 4-24-21 and 3-29-21 Pfizer. Will continue to update the snf for precert. Will need rodger and Rx along with a discharge [address] at discharge.  Mary luna

## 2021-09-05 LAB
ANION GAP SERPL CALCULATED.3IONS-SCNC: 11 MMOL/L (ref 9–17)
BUN BLDV-MCNC: 19 MG/DL (ref 8–23)
BUN/CREAT BLD: ABNORMAL (ref 9–20)
CALCIUM SERPL-MCNC: 9 MG/DL (ref 8.6–10.4)
CHLORIDE BLD-SCNC: 101 MMOL/L (ref 98–107)
CO2: 24 MMOL/L (ref 20–31)
CREAT SERPL-MCNC: 1.27 MG/DL (ref 0.7–1.2)
GFR AFRICAN AMERICAN: >60 ML/MIN
GFR NON-AFRICAN AMERICAN: 54 ML/MIN
GFR SERPL CREATININE-BSD FRML MDRD: ABNORMAL ML/MIN/{1.73_M2}
GFR SERPL CREATININE-BSD FRML MDRD: ABNORMAL ML/MIN/{1.73_M2}
GLUCOSE BLD-MCNC: 109 MG/DL (ref 70–99)
HCT VFR BLD CALC: 42.2 % (ref 41–53)
HEMOGLOBIN: 14.4 G/DL (ref 13.5–17.5)
MCH RBC QN AUTO: 32 PG (ref 26–34)
MCHC RBC AUTO-ENTMCNC: 34.1 G/DL (ref 31–37)
MCV RBC AUTO: 93.6 FL (ref 80–100)
NRBC AUTOMATED: ABNORMAL PER 100 WBC
PDW BLD-RTO: 15.4 % (ref 11.5–14.9)
PLATELET # BLD: 192 K/UL (ref 150–450)
PMV BLD AUTO: 6.8 FL (ref 6–12)
POTASSIUM SERPL-SCNC: 4.7 MMOL/L (ref 3.7–5.3)
RBC # BLD: 4.51 M/UL (ref 4.5–5.9)
SODIUM BLD-SCNC: 136 MMOL/L (ref 135–144)
WBC # BLD: 5.3 K/UL (ref 3.5–11)

## 2021-09-05 PROCEDURE — 80048 BASIC METABOLIC PNL TOTAL CA: CPT

## 2021-09-05 PROCEDURE — 6370000000 HC RX 637 (ALT 250 FOR IP): Performed by: FAMILY MEDICINE

## 2021-09-05 PROCEDURE — 85027 COMPLETE CBC AUTOMATED: CPT

## 2021-09-05 PROCEDURE — 97116 GAIT TRAINING THERAPY: CPT

## 2021-09-05 PROCEDURE — 97110 THERAPEUTIC EXERCISES: CPT

## 2021-09-05 PROCEDURE — 6360000002 HC RX W HCPCS: Performed by: FAMILY MEDICINE

## 2021-09-05 PROCEDURE — 99232 SBSQ HOSP IP/OBS MODERATE 35: CPT | Performed by: FAMILY MEDICINE

## 2021-09-05 PROCEDURE — 2580000003 HC RX 258: Performed by: FAMILY MEDICINE

## 2021-09-05 PROCEDURE — 96372 THER/PROPH/DIAG INJ SC/IM: CPT

## 2021-09-05 PROCEDURE — 36415 COLL VENOUS BLD VENIPUNCTURE: CPT

## 2021-09-05 PROCEDURE — 2060000000 HC ICU INTERMEDIATE R&B

## 2021-09-05 RX ADMIN — ROPINIROLE HYDROCHLORIDE 0.25 MG: 0.5 TABLET, FILM COATED ORAL at 10:23

## 2021-09-05 RX ADMIN — MAGNESIUM OXIDE TAB 400 MG (241.3 MG ELEMENTAL MG) 400 MG: 400 (241.3 MG) TAB at 10:23

## 2021-09-05 RX ADMIN — ROPINIROLE HYDROCHLORIDE 0.25 MG: 0.5 TABLET, FILM COATED ORAL at 14:02

## 2021-09-05 RX ADMIN — ENOXAPARIN SODIUM 40 MG: 40 INJECTION SUBCUTANEOUS at 10:24

## 2021-09-05 RX ADMIN — SODIUM CHLORIDE, PRESERVATIVE FREE 10 ML: 5 INJECTION INTRAVENOUS at 22:24

## 2021-09-05 RX ADMIN — CLOPIDOGREL BISULFATE 75 MG: 75 TABLET ORAL at 10:23

## 2021-09-05 RX ADMIN — CARBIDOPA AND LEVODOPA 1 TABLET: 25; 100 TABLET ORAL at 18:38

## 2021-09-05 RX ADMIN — CARBIDOPA AND LEVODOPA 1 TABLET: 25; 100 TABLET ORAL at 14:02

## 2021-09-05 RX ADMIN — ALLOPURINOL 100 MG: 100 TABLET ORAL at 10:23

## 2021-09-05 RX ADMIN — ATORVASTATIN CALCIUM 40 MG: 40 TABLET, FILM COATED ORAL at 22:22

## 2021-09-05 RX ADMIN — CARBIDOPA AND LEVODOPA 1 TABLET: 25; 100 TABLET ORAL at 10:24

## 2021-09-05 RX ADMIN — ROPINIROLE HYDROCHLORIDE 0.25 MG: 0.5 TABLET, FILM COATED ORAL at 22:22

## 2021-09-05 RX ADMIN — PANTOPRAZOLE SODIUM 40 MG: 40 TABLET, DELAYED RELEASE ORAL at 05:47

## 2021-09-05 RX ADMIN — CARBIDOPA AND LEVODOPA 1 TABLET: 25; 100 TABLET ORAL at 22:22

## 2021-09-05 RX ADMIN — SODIUM CHLORIDE, PRESERVATIVE FREE 10 ML: 5 INJECTION INTRAVENOUS at 10:24

## 2021-09-05 ASSESSMENT — PAIN - FUNCTIONAL ASSESSMENT: PAIN_FUNCTIONAL_ASSESSMENT: PREVENTS OR INTERFERES SOME ACTIVE ACTIVITIES AND ADLS

## 2021-09-05 ASSESSMENT — PAIN DESCRIPTION - PAIN TYPE: TYPE: ACUTE PAIN

## 2021-09-05 ASSESSMENT — PAIN DESCRIPTION - LOCATION: LOCATION: HEAD

## 2021-09-05 ASSESSMENT — PAIN SCALES - GENERAL
PAINLEVEL_OUTOF10: 7
PAINLEVEL_OUTOF10: 0

## 2021-09-05 ASSESSMENT — PAIN DESCRIPTION - DESCRIPTORS: DESCRIPTORS: ACHING

## 2021-09-05 NOTE — PROGRESS NOTES
[facility]   Progress Note                   Date:   9/4/21  Patient name: Opal Lara  Date of admission:  9/1/2021 11:17 AM  MRN:   [id number]  YOB: 1940  PCP: JW Hansen CNP    Reason for Admission:     Subjective:       Clinical Changes / Abnormalities:  Resting comfortably; HR 60-70 bpm       Medications:   Scheduled Meds:   sodium chloride flush  5-40 mL IntraVENous 2 times per day    enoxaparin  40 mg SubCUTAneous Daily    atorvastatin  40 mg Oral Daily    allopurinol  100 mg Oral Daily    carbidopa-levodopa  1 tablet Oral 4x Daily    clopidogrel  75 mg Oral Daily    pantoprazole  40 mg Oral QAM AC    rOPINIRole  0.25 mg Oral TID    magnesium oxide  400 mg Oral Daily     Continuous Infusions:   sodium chloride       CBC:   Recent Labs     09/03/21 0521 09/04/21  0630 09/05/21  0547   WBC 6.1 5.4 5.3   HGB 14.0 14.6 14.4    163 192     BMP:    Recent Labs     09/03/21 0521 09/04/21  0630 09/05/21  0547    134* 136   K 5.1 5.0 4.7   CL 99 97* 101   CO2 28 27 24   BUN 19 18 19   CREATININE 1.60* 1.29* 1.27*   GLUCOSE 107* 113* 109*     Hepatic: No results for input(s): AST, ALT, ALB, BILITOT, ALKPHOS in the last 72 hours. Troponin: No results for input(s): TROPONINI in the last 72 hours. BNP: No results for input(s): BNP in the last 72 hours. Lipids: No results for input(s): CHOL, HDL in the last 72 hours. Invalid input(s): LDLCALCU  INR: No results for input(s): INR in the last 72 hours. Objective:   Vitals: /62   Pulse 54   Temp 98.2 °F (36.8 °C) (Oral)   Resp 16   Ht 5' 7\" (1.702 m)   Wt 167 lb 15.9 oz (76.2 kg)   SpO2 98%   BMI 26.31 kg/m²   General appearance: alert and cooperative with exam  HEENT: Head: Normocephalic, no lesions, without obvious abnormality.   Neck: no adenopathy, no carotid bruit, no JVD, supple, symmetrical, trachea midline and thyroid not enlarged, symmetric, no tenderness/mass/nodules  Lungs: clear to auscultation bilaterally  Heart: regular rate and rhythm, S1, S2 normal, no murmur, click, rub or gallop  Abdomen: soft, non-tender; bowel sounds normal; no masses,  no organomegaly  Extremities: extremities normal, atraumatic, no cyanosis or edema  Neurologic: Mental status: Alert, oriented, thought content appropriate; Parkinsonian tremor left hand    LEXISCAN STRESS TEST ( 9/3/21)  There is no scintigraphic evidence for a reversible or fixed perfusion defect   to suggest reversible ischemia or infarct.       Function:       The gated SPECT data demonstrates normal left ventricular size and normal   wall motion.       Left ventricular ejection fraction:  59%         2D ECHO ( 9/1/21)  Left ventricle is normal in size. Mild left ventricular hypertrophy. Global left ventricular systolic function is normal. Estimated LV EF 60-65 %. Grade I (mild) left ventricular diastolic dysfunction. Mild aortic insufficiency. Mild mitral regurgitation. Mild tricuspid regurgitation. Mild pulmonary hypertension. Estimated right ventricular systolic pressure is [id number]      Assessment / Acute Cardiac Problems:   1. Weakness/fatigue  2. Sinus bradycardia, improved off metoprolol  3. CAD with PTCA/JENN to LAD in 2018  4. 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 CKD  6. HTN  7. HLP  8.  SDH in 2011       Patient Active Problem List:     Temporary loss of eyesight     Impaired renal function     Syncope : posterior circulation stroke vs Neurocardiogenic syncope      Intracranial bleed : traumatic left sub-dural hematoma ,managed conservatively in 2011     Headache     CKD (chronic kidney disease) stage 3, GFR 30-59 ml/min ([facility])     Depression     Hyperlipidemia     Microhematuria     Microalbuminuria     Essential hypertension     Generalized abdominal pain     Tubular adenoma of colon     Diverticulosis of colon     History of skull fracture     NSTEMI (non-ST elevated myocardial infarction) ([facility])     Nausea     Pure

## 2021-09-05 NOTE — CARE COORDINATION
ONGOING DISCHARGE PLANNING NOTE:    Writer reviewed LSW notes, and discharge plan is to DC to . Therapy notes faxed yesterday. Needs Pre-Cert.     Electronically signed by Emmy Araujo RN on 9/5/2021 at 3:56 PM

## 2021-09-05 NOTE — PROGRESS NOTES
FAMILY MEDICINE  - PROGRESS NOTE    Date:  9/5/2021  Hernesto Bravo  [id number]      Chief Complaint   Patient presents with    Nausea    Fatigue    Generalized Body Aches         Interval History:  not changed much, his heart rate is better without the Metoprolol and his BP is controlled. His stress test was negative. He denies any chest pain. Specialists notes, labs & imaging reviewed.       Subjective  Constitutional: positive for fatigue and overweight  Eyes: positive for contacts/glasses  Ears, nose, mouth, throat, and face: negative  Respiratory: negative  Cardiovascular: negative  Gastrointestinal: positive for reflux symptoms and diverticulosis  Hematologic/lymphatic: negative  Musculoskeletal:positive for arthralgias, muscle weakness, myalgias and stiff joints  Neurological: positive for gait problems, headaches, tremors and weakness  Behavioral/Psych: positive for depression:    Objective:    /66   Pulse 51   Temp 98 °F (36.7 °C) (Oral)   Resp 16   Ht 5' 7\" (1.702 m)   Wt 167 lb 15.9 oz (76.2 kg)   SpO2 98%   BMI 26.31 kg/m²   General appearance - alert, well appearing, and in no distress and overweight  Mental status - alert, oriented to person, place, and time  Eyes - pupils equal and reactive, extraocular eye movements intact  Ears - hearing grossly normal bilaterally  Nose - normal and patent, no erythema, discharge or polyps  Mouth - mucous membranes moist, pharynx normal without lesions  Neck - supple, no significant adenopathy  Lymphatics - no palpable lymphadenopathy, no hepatosplenomegaly  Chest - clear to auscultation, no wheezes, rales or rhonchi, symmetric air entry  Heart - bradycardic rate, regular rhythm, normal S1, S2, no murmurs, rubs, clicks or gallops  Abdomen - soft, nontender, nondistended, no masses or organomegaly  Breasts - not examined  Back exam - not examined  Neurological - neck supple without rigidity, abnormal neurological exam unchanged from prior examinations  Musculoskeletal - no joint tenderness, deformity or swelling  Extremities - peripheral pulses normal, no pedal edema, no clubbing or cyanosis  Skin - normal coloration and turgor, no rashes, no suspicious skin lesions noted    Data:   Medications:   Current Facility-Administered Medications   Medication Dose Route Frequency Provider Last Rate Last Admin    sodium chloride flush 0.9 % injection 10 mL  10 mL IntraVENous PRN Kayleigh Partida MD   10 mL at 09/03/21 0721    sodium chloride flush 0.9 % injection 5-40 mL  5-40 mL IntraVENous 2 times per day Rosemary Harris MD   10 mL at 09/04/21 2103    sodium chloride flush 0.9 % injection 5-40 mL  5-40 mL IntraVENous PRN Rosemary Harris MD        0.9 % sodium chloride infusion  25 mL IntraVENous PRN Rosemary Harris MD        enoxaparin (LOVENOX) injection 40 mg  40 mg SubCUTAneous Daily Rosemary Harris MD   40 mg at 09/04/21 1231    ondansetron (ZOFRAN-ODT) disintegrating tablet 4 mg  4 mg Oral Q8H PRN Rosemary Harris MD        Or    ondansetron [facility]) injection 4 mg  4 mg IntraVENous Q6H PRN Rosemary Harris MD        polyethylene glycol (GLYCOLAX) packet 17 g  17 g Oral Daily PRN Rosemary Harris MD        acetaminophen (TYLENOL) tablet 650 mg  650 mg Oral Q6H PRN Rosemary Harris MD   650 mg at 09/03/21 2357    Or    acetaminophen (TYLENOL) suppository 650 mg  650 mg Rectal Q6H PRN Rosemary Harris MD        nitroGLYCERIN (NITROSTAT) SL tablet 0.4 mg  0.4 mg SubLINGual Q5 Min PRN Rosemary Harris MD        atorvastatin (LIPITOR) tablet 40 mg  40 mg Oral Daily Rosemary Harris MD   40 mg at 09/04/21 2103    allopurinol (ZYLOPRIM) tablet 100 mg  100 mg Oral Daily Rosemary Harris MD   100 mg at 09/04/21 0932    butalbital-APAP-caffeine -40 MG per capsule 1 capsule  1 capsule Oral Q6H PRN Rosemary Harris MD   1 capsule at 09/04/21 0453    carbidopa-levodopa (SINEMET)  MG per tablet 1 tablet  1 tablet Oral 4x Daily Magi Pisano MD   1 tablet at 09/04/21 2103    clopidogrel (PLAVIX) tablet 75 mg  75 mg Oral Daily Magi Pisano MD   75 mg at 09/04/21 0933    pantoprazole (PROTONIX) tablet 40 mg  40 mg Oral QAM AC Magi Pisano MD   40 mg at 09/05/21 0547    rOPINIRole (REQUIP) tablet 0.25 mg  0.25 mg Oral TID Magi Pisano MD   0.25 mg at 09/04/21 2103    magnesium oxide (MAG-OX) tablet 400 mg  400 mg Oral Daily Magi Pisano MD   400 mg at 09/04/21 0932       Intake/Output Summary (Last 24 hours) at 9/5/2021 0749  Last data filed at 9/5/2021 0549  Gross per 24 hour   Intake 480 ml   Output 1750 ml   Net -1270 ml     Recent Results (from the past 24 hour(s))   CBC    Collection Time: 09/05/21  5:47 AM   Result Value Ref Range    WBC 5.3 3.5 - 11.0 k/uL    RBC 4.51 4.5 - 5.9 m/uL    Hemoglobin 14.4 13.5 - 17.5 g/dL    Hematocrit 42.2 41 - 53 %    MCV 93.6 80 - 100 fL    MCH 32.0 26 - 34 pg    MCHC 34.1 31 - 37 g/dL    RDW 15.4 (H) 11.5 - 14.9 %    Platelets 784 413 - 763 k/uL    MPV 6.8 6.0 - 12.0 fL    NRBC Automated NOT REPORTED per 100 WBC   Basic Metabolic Panel    Collection Time: 09/05/21  5:47 AM   Result Value Ref Range    Glucose 109 (H) 70 - 99 mg/dL    BUN 19 8 - 23 mg/dL    CREATININE 1.27 (H) 0.70 - 1.20 mg/dL    Bun/Cre Ratio NOT REPORTED 9 - 20    Calcium 9.0 8.6 - 10.4 mg/dL    Sodium 136 135 - 144 mmol/L    Potassium 4.7 3.7 - 5.3 mmol/L    Chloride 101 98 - 107 mmol/L    CO2 24 20 - 31 mmol/L    Anion Gap 11 9 - 17 mmol/L    GFR Non-African American 54 (L) >60 mL/min    GFR African American >60 >60 mL/min    GFR Comment          GFR Staging NOT REPORTED      -----------------------------------------------------------------  RAD:  EKG:  Micro:     Assessment & Plan:    Patient Active Problem List:     Temporary loss of eyesight     Impaired renal function     Syncope : posterior circulation stroke vs Neurocardiogenic syncope      Intracranial bleed : traumatic left sub-dural hematoma ,managed conservatively in 2011     Headache     CKD (chronic kidney disease) stage 3, GFR 30-59 ml/min ([facility])     Depression     Hyperlipidemia     Microhematuria     Microalbuminuria     Essential hypertension     Generalized abdominal pain     Tubular adenoma of colon     Diverticulosis of colon     History of skull fracture     NSTEMI (non-ST elevated myocardial infarction) ([facility])     Nausea     Pure hypercholesterolemia     Prediabetes     Parkinson disease ([facility])     Chronic post-traumatic headache, not intractable     Fall (on) (from) other stairs and steps, initial encounter     Strain of iliopsoas muscle, initial encounter     Acute pain of left knee     Closed fracture of second toe of right foot     Closed fracture of second toe of left foot     Abnormal ECG     Cerebrovascular accident ([facility] [address]     Chest pain, unspecified     Hematoma of scalp     Left ventricular hypertrophy     Mild aortic valve regurgitation     Pulmonary hypertension, mild ([facility])     Lumbar radiculopathy     Dizziness     Hyponatremia     Vomiting     General weakness     Overweight (BMI 25.0-29. 9)     Frequent falls     Symptomatic bradycardia           Plan:  -Symptomatic bradycardia - improved off of Metoprolol, stress test negative, Cardiology following and recommends outpatient Holter monitor.  -Parkinson's disease - unchanged, on Sinemet & Requip.  -Generalized weakness - awaiting pre-cert of SNF for rehab.  -Continue current treatments.  -Complete orders per chart.     See orders   Disposition:    Electronically signed by Tatiana Painter MD on 9/5/2021 at 7:49 AM

## 2021-09-05 NOTE — PLAN OF CARE
Problem: Safety:  Goal: Free from accidental physical injury  Description: Free from accidental physical injury  9/5/2021 0305 by Juliette Ramon RN  Outcome: Met This Shift     Problem: Safety:  Goal: Free from intentional harm  Description: Free from intentional harm  9/5/2021 0305 by Juliette Ramon RN  Outcome: Met This Shift     Problem: Infection:  Goal: Will remain free from infection  Description: Will remain free from infection  9/5/2021 0305 by Juliette Ramon RN  Outcome: Ongoing     Problem: Daily Care:  Goal: Daily care needs are met  Description: Daily care needs are met  9/5/2021 0305 by Juliette Ramon RN  Outcome: Ongoing     Problem: Pain:  Goal: Patient's pain/discomfort is manageable  Description: Patient's pain/discomfort is manageable  9/5/2021 0305 by Juliette Ramon RN  Outcome: Ongoing     Problem: Pain:  Goal: Pain level will decrease  Description: Pain level will decrease  9/5/2021 0305 by Juliette Ramon RN  Outcome: Ongoing     Problem: Pain:  Goal: Control of acute pain  Description: Control of acute pain  9/5/2021 0305 by Juliette Ramon RN  Outcome: Ongoing     Problem: Pain:  Goal: Control of chronic pain  Description: Control of chronic pain  9/5/2021 0305 by Juliette Ramon RN  Outcome: Ongoing     Problem: Skin Integrity:  Goal: Skin integrity will stabilize  Description: Skin integrity will stabilize  9/5/2021 0305 by Juliette Ramon RN  Outcome: Ongoing     Problem: Falls - Risk of:  Goal: Will remain free from falls  Description: Will remain free from falls  9/5/2021 0305 by Juliette Ramon RN  Outcome: Ongoing     Problem: Falls - Risk of:  Goal: Absence of physical injury  Description: Absence of physical injury  9/5/2021 0305 by Juliette Ramon RN  Outcome: Ongoing

## 2021-09-05 NOTE — PLAN OF CARE
Problem: Infection:  Goal: Will remain free from infection  Description: Will remain free from infection  Outcome: Ongoing Patient remains afebrile; WBC count is within normal limits; no signs of erythema, edema, or warmth. Will continue to monitor for signs/symptoms of infection. Problem: Safety:  Goal: Free from accidental physical injury  Description: Free from accidental physical injury  Outcome: Ongoing  Goal: Free from intentional harm  Description: Free from intentional harm  Outcome: Ongoing Pt assessed as a fall risk this shift. Remains free from falls and accidental injury at this time. Fall precautions in place, including falling star sign and fall risk band on pt. Floor free from obstacles, and bed is locked and in lowest position. Adequate lighting provided. Pt encouraged to call before getting OOB for any need. Bed alarm activated. Will continue to monitor needs during hourly rounding, and reinforce education on use of call light.        Problem: Daily Care:  Goal: Daily care needs are met  Description: Daily care needs are met  Outcome: Ongoing     Problem: Pain:  Goal: Patient's pain/discomfort is manageable  Description: Patient's pain/discomfort is manageable  Outcome: Ongoing  Goal: Pain level will decrease  Description: Pain level will decrease  Outcome: Ongoing  Goal: Control of acute pain  Description: Control of acute pain  Outcome: Ongoing  Goal: Control of chronic pain  Description: Control of chronic pain  Outcome: Ongoing     Problem: Skin Integrity:  Goal: Skin integrity will stabilize  Description: Skin integrity will stabilize  Outcome: Ongoing     Problem: Falls - Risk of:  Goal: Will remain free from falls  Description: Will remain free from falls  Outcome: Ongoing  Goal: Absence of physical injury  Description: Absence of physical injury  Outcome: Ongoing

## 2021-09-05 NOTE — FLOWSHEET NOTE
09/05/21 1514   Encounter Summary   Services provided to: Patient   Referral/Consult From: [facility]   (9/5/21V)   Volunteer Visit Yes   Complexity of Encounter Low   Length of Encounter 15 minutes   Spiritual/Jainism   Type Spiritual support   Intervention Communion;Prayer   Sacraments   Communion Patient received communion

## 2021-09-05 NOTE — PROGRESS NOTES
Physical Therapy  Facility/Department: [facility] PROGRESSIVE CARE  Daily Treatment Note  NAME: Gian Waterman  : 1940  MRN: [id number]    Date of Service: 2021    Discharge Recommendations:  Patient would benefit from continued therapy after discharge   PT Equipment Recommendations  Equipment Needed: No    Assessment   Body structures, Functions, Activity limitations: Decreased functional mobility ; Decreased balance;Decreased endurance  Assessment: Impaired mobility due to decreased tolerance to activity and safety issues due to deficites in balance  Decision Making: Low Complexity  History: Parkinsons  Exam: decreased balance, endurance  Clinical Presentation: stable  REQUIRES PT FOLLOW UP: Yes  Activity Tolerance  Activity Tolerance: Patient limited by endurance     Patient Diagnosis(es): The primary encounter diagnosis was General weakness. A diagnosis of Symptomatic bradycardia was also pertinent to this visit. has a past medical history of 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 ([address] CKD (chronic kidney disease) stage 2, GFR 60-89 ml/min, CKD (chronic kidney disease) stage 3, GFR 30-59 ml/min ([facility]), Diverticulosis of colon, GERD (gastroesophageal reflux disease), Impaired renal function, MRSA (methicillin resistant staph aureus) culture positive, Osteoarthritis of knee, Parkinson disease ([address] Proteinuria, Skull fracture ([address] Temporary loss of eyesight, and Tubular adenoma of colon. has a past surgical history that includes Colonoscopy (2012); shoulder surgery (Right); pr colsc flx w/rmvl of tumor polyp lesion snare tq (N/A, 2017); and Colonoscopy (2017). Restrictions  Restrictions/Precautions  Restrictions/Precautions: Fall Risk  Required Braces or Orthoses?: No  Implants present? :  (stent in kidney)  Subjective   General  Chart Reviewed: Yes  Response To Previous Treatment: Patient with no complaints from previous session.   Family / Caregiver Present: No  Subjective  Subjective: (P) Patient agreeable to PT. Patient complains of sharp pains that occur in chest and back. Non present during PT session. General Comment  Comments: RN ok for PT treatment. Pain Screening  Patient Currently in Pain: Yes  Pain Assessment  Pain Assessment: 0-10  Pain Level: 7  Pain Type: Acute pain  Pain Location: Head  Pain Radiating Towards: (P) Increased tempt radiating on the side of the head. Pain Descriptors: Aching  Functional Pain Assessment: Prevents or interferes some active activities and ADLs  Non-Pharmaceutical Pain Intervention(s): Ambulation/Increased Activity;Relaxation techniques;Repositioned; Rest  Response to Pain Intervention: Patient Satisfied  Vital Signs  Patient Currently in Pain: Yes  Objective   Bed mobility  Bridging: Minimal assistance  Rolling to Right: Modified independent  Supine to Sit: Modified independent  Sit to Supine: Supervision  Scooting: Modified independent;Minimal assistance  Comment: Patient completed bed mobility with no signs of fatigue. Min A for bridging and scooting. HOB elevated and use of handrail with scooting and bridging. Transfers  Sit to Stand: Contact guard assistance  Stand to sit: Contact guard assistance  Comment: Anterior lean present with transfers. Ambulation  Ambulation?: Yes  Ambulation 1  Surface: level tile  Device: Single point cane  Assistance: Contact guard assistance  Gait Deviations: Shuffles;Decreased step length;Decreased step height  Distance: 60ftx2  Comments: Parkinsons gait  Stairs/Curb  Stairs?: No (Deferred due to increase fatigue and dizziness while amb. )     Balance  Sitting - Static: Fair;+ (SBA)  Sitting - Dynamic: Fair (CGA)  Standing - Static: Fair (CGA)  Standing - Dynamic: Fair (CGA)  Comments: Standing balance assessed with and without AD. Other exercises  Other exercises 1: B LE exercises 10-15x.     Goals  Short term goals  Time Frame for Short term goals: 3-5 days  Short term goal 1: mod-I transfers  Short term goal 2: mod-I gait with cane x 100ft  Short term goal 3: negotiate 5 steps with rail/cane mod-I    Plan    Plan  Times per week: 5-6x/wk  Current Treatment Recommendations: Balance Training, Functional Mobility Training, Gait Training, Stair training, Endurance Training  Safety Devices  Type of devices: Call light within reach, Bed alarm in place, Left in bed, Patient at risk for falls     Therapy Time   Individual Concurrent Group Co-treatment   Time In 1025         Time Out 1048         Minutes [id number] [place] [address]

## 2021-09-06 LAB
ANION GAP SERPL CALCULATED.3IONS-SCNC: 8 MMOL/L (ref 9–17)
BUN BLDV-MCNC: 19 MG/DL (ref 8–23)
BUN/CREAT BLD: ABNORMAL (ref 9–20)
CALCIUM SERPL-MCNC: 9.1 MG/DL (ref 8.6–10.4)
CHLORIDE BLD-SCNC: 100 MMOL/L (ref 98–107)
CO2: 24 MMOL/L (ref 20–31)
CREAT SERPL-MCNC: 1.29 MG/DL (ref 0.7–1.2)
GFR AFRICAN AMERICAN: >60 ML/MIN
GFR NON-AFRICAN AMERICAN: 53 ML/MIN
GFR SERPL CREATININE-BSD FRML MDRD: ABNORMAL ML/MIN/{1.73_M2}
GFR SERPL CREATININE-BSD FRML MDRD: ABNORMAL ML/MIN/{1.73_M2}
GLUCOSE BLD-MCNC: 110 MG/DL (ref 70–99)
HCT VFR BLD CALC: 40.9 % (ref 41–53)
HEMOGLOBIN: 13.9 G/DL (ref 13.5–17.5)
MCH RBC QN AUTO: 31.9 PG (ref 26–34)
MCHC RBC AUTO-ENTMCNC: 34 G/DL (ref 31–37)
MCV RBC AUTO: 93.8 FL (ref 80–100)
NRBC AUTOMATED: ABNORMAL PER 100 WBC
PDW BLD-RTO: 15.4 % (ref 11.5–14.9)
PLATELET # BLD: 182 K/UL (ref 150–450)
PMV BLD AUTO: 7 FL (ref 6–12)
POTASSIUM SERPL-SCNC: 4.2 MMOL/L (ref 3.7–5.3)
RBC # BLD: 4.36 M/UL (ref 4.5–5.9)
SODIUM BLD-SCNC: 132 MMOL/L (ref 135–144)
WBC # BLD: 4.8 K/UL (ref 3.5–11)

## 2021-09-06 PROCEDURE — 80048 BASIC METABOLIC PNL TOTAL CA: CPT

## 2021-09-06 PROCEDURE — 97116 GAIT TRAINING THERAPY: CPT

## 2021-09-06 PROCEDURE — 6360000002 HC RX W HCPCS: Performed by: FAMILY MEDICINE

## 2021-09-06 PROCEDURE — 2060000000 HC ICU INTERMEDIATE R&B

## 2021-09-06 PROCEDURE — 96372 THER/PROPH/DIAG INJ SC/IM: CPT

## 2021-09-06 PROCEDURE — 99232 SBSQ HOSP IP/OBS MODERATE 35: CPT | Performed by: FAMILY MEDICINE

## 2021-09-06 PROCEDURE — 36415 COLL VENOUS BLD VENIPUNCTURE: CPT

## 2021-09-06 PROCEDURE — 97530 THERAPEUTIC ACTIVITIES: CPT

## 2021-09-06 PROCEDURE — 85027 COMPLETE CBC AUTOMATED: CPT

## 2021-09-06 PROCEDURE — 97110 THERAPEUTIC EXERCISES: CPT

## 2021-09-06 PROCEDURE — 6370000000 HC RX 637 (ALT 250 FOR IP): Performed by: FAMILY MEDICINE

## 2021-09-06 PROCEDURE — 2580000003 HC RX 258: Performed by: FAMILY MEDICINE

## 2021-09-06 RX ADMIN — CARBIDOPA AND LEVODOPA 1 TABLET: 25; 100 TABLET ORAL at 13:52

## 2021-09-06 RX ADMIN — CARBIDOPA AND LEVODOPA 1 TABLET: 25; 100 TABLET ORAL at 10:20

## 2021-09-06 RX ADMIN — SODIUM CHLORIDE, PRESERVATIVE FREE 10 ML: 5 INJECTION INTRAVENOUS at 19:57

## 2021-09-06 RX ADMIN — BUTALBITA,ACETAMINOPHEN AND CAFFEINE 1 CAPSULE: 50; 300; 40 CAPSULE ORAL at 12:52

## 2021-09-06 RX ADMIN — ALLOPURINOL 100 MG: 100 TABLET ORAL at 10:30

## 2021-09-06 RX ADMIN — SODIUM CHLORIDE, PRESERVATIVE FREE 10 ML: 5 INJECTION INTRAVENOUS at 10:20

## 2021-09-06 RX ADMIN — CARBIDOPA AND LEVODOPA 1 TABLET: 25; 100 TABLET ORAL at 19:52

## 2021-09-06 RX ADMIN — CLOPIDOGREL BISULFATE 75 MG: 75 TABLET ORAL at 10:19

## 2021-09-06 RX ADMIN — ENOXAPARIN SODIUM 40 MG: 40 INJECTION SUBCUTANEOUS at 10:18

## 2021-09-06 RX ADMIN — ATORVASTATIN CALCIUM 40 MG: 40 TABLET, FILM COATED ORAL at 19:53

## 2021-09-06 RX ADMIN — MAGNESIUM OXIDE TAB 400 MG (241.3 MG ELEMENTAL MG) 400 MG: 400 (241.3 MG) TAB at 10:23

## 2021-09-06 RX ADMIN — PANTOPRAZOLE SODIUM 40 MG: 40 TABLET, DELAYED RELEASE ORAL at 07:00

## 2021-09-06 RX ADMIN — ROPINIROLE HYDROCHLORIDE 0.25 MG: 0.5 TABLET, FILM COATED ORAL at 10:19

## 2021-09-06 RX ADMIN — ROPINIROLE HYDROCHLORIDE 0.25 MG: 0.5 TABLET, FILM COATED ORAL at 19:53

## 2021-09-06 RX ADMIN — CARBIDOPA AND LEVODOPA 1 TABLET: 25; 100 TABLET ORAL at 18:23

## 2021-09-06 RX ADMIN — ROPINIROLE HYDROCHLORIDE 0.25 MG: 0.5 TABLET, FILM COATED ORAL at 15:18

## 2021-09-06 ASSESSMENT — PAIN SCALES - GENERAL
PAINLEVEL_OUTOF10: 0
PAINLEVEL_OUTOF10: 0

## 2021-09-06 NOTE — PROGRESS NOTES
Patient has refused bed bath three times today asking for writer to return later because he would want to bathe at that time.

## 2021-09-06 NOTE — CARE COORDINATION
Patient will need updated PT and OT notes on this date or very first thing Tuesday morning in order for the pre-cert to get started on Tuesday. Mario Apley following and can start pre-cert on Tuesday.

## 2021-09-06 NOTE — PLAN OF CARE
Problem: Infection:  Goal: Will remain free from infection  Description: Will remain free from infection  9/6/2021 1452 by Mookie Sharp RN  Outcome: Ongoing  Patient remains afebrile and show no signs of infection this shift. Problem: Safety:  Goal: Free from accidental physical injury  Description: Free from accidental physical injury  9/6/2021 1452 by Mookie Sharp RN  Outcome: Ongoing  Patient remains from from accidental physical injury this shift AEB following direction while working with nursing staff, physical and occupational staff members and by calling out appropriately for help in the bathroom. Problem: Pain:  Goal: Patient's pain/discomfort is manageable  Description: Patient's pain/discomfort is manageable  9/6/2021 1452 by Mookie Shrap RN  Outcome: Ongoing  Patient treated for headache this shift; verbalizes adequate paint control through current medication regimen. Problem: Falls - Risk of:  Goal: Will remain free from falls  Description: Will remain free from falls  9/6/2021 1452 by Mookie Sharp RN  Outcome: Ongoing  Patient remains free from falls this shift; up to walk in hallway with PT today. Patient is oriented to own physical ability and calls out appropriately for standby assistance with ambulation.

## 2021-09-06 NOTE — PROGRESS NOTES
FAMILY MEDICINE  - PROGRESS NOTE    Date:  9/6/2021  Juan Antonio Mccormack  [id number]      Chief Complaint   Patient presents with    Nausea    Fatigue    Generalized Body Aches         Interval History:  not changed much, his heart rate is better without the Metoprolol and his BP is controlled. His stress test was negative. He denies any chest pain. Specialists notes, labs & imaging reviewed.       Subjective  Constitutional: positive for fatigue and overweight  Eyes: positive for contacts/glasses  Ears, nose, mouth, throat, and face: negative  Respiratory: negative  Cardiovascular: negative  Gastrointestinal: positive for reflux symptoms and diverticulosis  Hematologic/lymphatic: negative  Musculoskeletal:positive for arthralgias, muscle weakness, myalgias and stiff joints  Neurological: positive for gait problems, headaches, tremors and weakness  Behavioral/Psych: positive for depression:    Objective:    /61   Pulse 56   Temp 98.1 °F (36.7 °C) (Oral)   Resp 16   Ht 5' 7\" (1.702 m)   Wt 170 lb 13.7 oz (77.5 kg)   SpO2 96%   BMI 26.76 kg/m²   General appearance - alert, well appearing, and in no distress and overweight  Mental status - alert, oriented to person, place, and time  Eyes - pupils equal and reactive, extraocular eye movements intact  Ears - hearing grossly normal bilaterally  Nose - normal and patent, no erythema, discharge or polyps  Mouth - mucous membranes moist, pharynx normal without lesions  Neck - supple, no significant adenopathy  Lymphatics - no palpable lymphadenopathy, no hepatosplenomegaly  Chest - clear to auscultation, no wheezes, rales or rhonchi, symmetric air entry  Heart - bradycardic rate, regular rhythm, normal S1, S2, no murmurs, rubs, clicks or gallops  Abdomen - soft, nontender, nondistended, no masses or organomegaly  Breasts - not examined  Back exam - not examined  Neurological - neck supple without rigidity, abnormal neurological exam unchanged from prior examinations  Musculoskeletal - no joint tenderness, deformity or swelling  Extremities - peripheral pulses normal, no pedal edema, no clubbing or cyanosis  Skin - normal coloration and turgor, no rashes, no suspicious skin lesions noted    Data:   Medications:   Current Facility-Administered Medications   Medication Dose Route Frequency Provider Last Rate Last Admin    sodium chloride flush 0.9 % injection 10 mL  10 mL IntraVENous PRN Rupa Babcock MD   10 mL at 09/03/21 0721    sodium chloride flush 0.9 % injection 5-40 mL  5-40 mL IntraVENous 2 times per day Marian Morrison MD   10 mL at 09/05/21 2224    sodium chloride flush 0.9 % injection 5-40 mL  5-40 mL IntraVENous PRN Marian Morrison MD        0.9 % sodium chloride infusion  25 mL IntraVENous PRN Marian Morrison MD        enoxaparin (LOVENOX) injection 40 mg  40 mg SubCUTAneous Daily Marian Morrison MD   40 mg at 09/05/21 1024    ondansetron (ZOFRAN-ODT) disintegrating tablet 4 mg  4 mg Oral Q8H PRN Marian Morrison MD        Or    ondansetron [facility]) injection 4 mg  4 mg IntraVENous Q6H PRN Marian Morrison MD        polyethylene glycol (GLYCOLAX) packet 17 g  17 g Oral Daily PRN Marian Morrison MD        acetaminophen (TYLENOL) tablet 650 mg  650 mg Oral Q6H PRN Marian Morrison MD   650 mg at 09/03/21 2357    Or    acetaminophen (TYLENOL) suppository 650 mg  650 mg Rectal Q6H PRN Marian Morrison MD        nitroGLYCERIN (NITROSTAT) SL tablet 0.4 mg  0.4 mg SubLINGual Q5 Min PRN Marian Morrison MD        atorvastatin (LIPITOR) tablet 40 mg  40 mg Oral Daily Marian Morrison MD   40 mg at 09/05/21 2222    allopurinol (ZYLOPRIM) tablet 100 mg  100 mg Oral Daily Marian Morrison MD   100 mg at 09/05/21 1023    butalbital-APAP-caffeine -40 MG per capsule 1 capsule  1 capsule Oral Q6H PRN Marian Morrison MD   1 capsule at 09/04/21 0453    carbidopa-levodopa (SINEMET)  MG per tablet 1 tablet  1 tablet Oral 4x Daily Joe Jones MD   1 tablet at 09/05/21 2222    clopidogrel (PLAVIX) tablet 75 mg  75 mg Oral Daily Joe Jones MD   75 mg at 09/05/21 1023    pantoprazole (PROTONIX) tablet 40 mg  40 mg Oral QAM AC Joe Jones MD   40 mg at 09/06/21 0700    rOPINIRole (REQUIP) tablet 0.25 mg  0.25 mg Oral TID Joe Jones MD   0.25 mg at 09/05/21 2222    magnesium oxide (MAG-OX) tablet 400 mg  400 mg Oral Daily Joe Jones MD   400 mg at 09/05/21 1023       Intake/Output Summary (Last 24 hours) at 9/6/2021 0819  Last data filed at 9/6/2021 0701  Gross per 24 hour   Intake 240 ml   Output 2375 ml   Net -2135 ml     Recent Results (from the past 24 hour(s))   CBC    Collection Time: 09/06/21  5:14 AM   Result Value Ref Range    WBC 4.8 3.5 - 11.0 k/uL    RBC 4.36 (L) 4.5 - 5.9 m/uL    Hemoglobin 13.9 13.5 - 17.5 g/dL    Hematocrit 40.9 (L) 41 - 53 %    MCV 93.8 80 - 100 fL    MCH 31.9 26 - 34 pg    MCHC 34.0 31 - 37 g/dL    RDW 15.4 (H) 11.5 - 14.9 %    Platelets 823 960 - 676 k/uL    MPV 7.0 6.0 - 12.0 fL    NRBC Automated NOT REPORTED per 100 WBC   Basic Metabolic Panel    Collection Time: 09/06/21  5:14 AM   Result Value Ref Range    Glucose 110 (H) 70 - 99 mg/dL    BUN 19 8 - 23 mg/dL    CREATININE 1.29 (H) 0.70 - 1.20 mg/dL    Bun/Cre Ratio NOT REPORTED 9 - 20    Calcium 9.1 8.6 - 10.4 mg/dL    Sodium 132 (L) 135 - 144 mmol/L    Potassium 4.2 3.7 - 5.3 mmol/L    Chloride 100 98 - 107 mmol/L    CO2 24 20 - 31 mmol/L    Anion Gap 8 (L) 9 - 17 mmol/L    GFR Non-African American 53 (L) >60 mL/min    GFR African American >60 >60 mL/min    GFR Comment          GFR Staging NOT REPORTED      -----------------------------------------------------------------  RAD:  EKG:  Micro:     Assessment & Plan:    Patient Active Problem List:     Temporary loss of eyesight     Impaired renal function     Syncope : posterior circulation stroke vs Neurocardiogenic syncope      Intracranial bleed Janice Mazariegos traumatic left sub-dural hematoma ,managed conservatively in 2011     Headache     CKD (chronic kidney disease) stage 3, GFR 30-59 ml/min ([facility])     Depression     Hyperlipidemia     Microhematuria     Microalbuminuria     Essential hypertension     Generalized abdominal pain     Tubular adenoma of colon     Diverticulosis of colon     History of skull fracture     NSTEMI (non-ST elevated myocardial infarction) ([facility])     Nausea     Pure hypercholesterolemia     Prediabetes     Parkinson disease ([facility])     Chronic post-traumatic headache, not intractable     Fall (on) (from) other stairs and steps, initial encounter     Strain of iliopsoas muscle, initial encounter     Acute pain of left knee     Closed fracture of second toe of right foot     Closed fracture of second toe of left foot     Abnormal ECG     Cerebrovascular accident ([facility] [address]     Chest pain, unspecified     Hematoma of scalp     Left ventricular hypertrophy     Mild aortic valve regurgitation     Pulmonary hypertension, mild ([facility])     Lumbar radiculopathy     Dizziness     Hyponatremia     Vomiting     General weakness     Overweight (BMI 25.0-29. 9)     Frequent falls     Symptomatic bradycardia           Plan:  -Symptomatic bradycardia - improved off of Metoprolol, stress test negative, Cardiology following and recommends outpatient Holter monitor.  -Parkinson's disease - unchanged, on Sinemet & Requip.  -Generalized weakness - awaiting pre-cert of SNF for rehab.  -Continue current treatments.  -Complete orders per chart.     See orders   Disposition:    Electronically signed by Kaylee Marsh MD on 9/6/2021 at 8:19 AM

## 2021-09-06 NOTE — PLAN OF CARE
Problem: Infection:  Goal: Will remain free from infection  Description: Will remain free from infection  9/6/2021 0648 by Rober Herman RN  Outcome: Ongoing  9/5/2021 1752 by Miko Cochran RN  Outcome: Ongoing     Problem: Safety:  Goal: Free from accidental physical injury  Description: Free from accidental physical injury  9/6/2021 0648 by Rober Herman RN  Outcome: Ongoing  9/5/2021 1752 by Miko Cochran RN  Outcome: Ongoing  Goal: Free from intentional harm  Description: Free from intentional harm  9/6/2021 0648 by Rober Herman RN  Outcome: Ongoing  9/5/2021 1752 by Miko Cochran RN  Outcome: Ongoing     Problem: Daily Care:  Goal: Daily care needs are met  Description: Daily care needs are met  9/6/2021 0648 by Rober Herman RN  Outcome: Ongoing  9/5/2021 1752 by Miko Cochran RN  Outcome: Ongoing     Problem: Pain:  Description: Pain management should include both nonpharmacologic and pharmacologic interventions.   Goal: Patient's pain/discomfort is manageable  Description: Patient's pain/discomfort is manageable  9/6/2021 0648 by Rober Herman RN  Outcome: Ongoing  9/5/2021 1752 by Miko Cochran RN  Outcome: Ongoing  Goal: Pain level will decrease  Description: Pain level will decrease  9/6/2021 0648 by Rober Herman RN  Outcome: Ongoing  9/5/2021 1752 by Miko Cochran RN  Outcome: Ongoing  Goal: Control of acute pain  Description: Control of acute pain  9/6/2021 0648 by Rober Herman RN  Outcome: Ongoing  9/5/2021 1752 by Miko Cochran RN  Outcome: Ongoing  Goal: Control of chronic pain  Description: Control of chronic pain  9/6/2021 0648 by Rober Herman RN  Outcome: Ongoing  9/5/2021 1752 by Miko Cochran RN  Outcome: Ongoing     Problem: Skin Integrity:  Goal: Skin integrity will stabilize  Description: Skin integrity will stabilize  9/6/2021 0648 by Rober Herman RN  Outcome: Ongoing  9/5/2021 1752 by Miko Cochran RN  Outcome: Ongoing     Problem: Falls - Risk of:  Goal: Will remain free from falls  Description: Will remain free from falls  9/6/2021 0648 by Karoline Cruz RN  Outcome: Ongoing  9/5/2021 1752 by Alejandro Ford RN  Outcome: Ongoing  Goal: Absence of physical injury  Description: Absence of physical injury  9/6/2021 0648 by Karoline Cruz RN  Outcome: Ongoing  9/5/2021 1752 by Alejandro Ford RN  Outcome: Ongoing

## 2021-09-06 NOTE — PROGRESS NOTES
Physical Therapy  Facility/Department: [facility] PROGRESSIVE CARE  Daily Treatment Note  NAME: Arthur Iyer  : 1940  MRN: [id number]    Date of Service: 2021    Discharge Recommendations:  Patient would benefit from continued therapy after discharge        Assessment   Body structures, Functions, Activity limitations: Decreased functional mobility ; Decreased balance;Decreased endurance  Assessment: Impaired mobility due to decreased tolerance to activity and safety issues due to deficites in balance  Decision Making: Low Complexity  History: Parkinsons  Exam: decreased balance, endurance  Clinical Presentation: stable  REQUIRES PT FOLLOW UP: Yes  Activity Tolerance  Activity Tolerance: Patient limited by endurance; Patient limited by fatigue     Patient Diagnosis(es): The primary encounter diagnosis was General weakness. A diagnosis of Symptomatic bradycardia was also pertinent to this visit. has a past medical history of 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 ([address] CKD (chronic kidney disease) stage 2, GFR 60-89 ml/min, CKD (chronic kidney disease) stage 3, GFR 30-59 ml/min ([facility]), Diverticulosis of colon, GERD (gastroesophageal reflux disease), Impaired renal function, MRSA (methicillin resistant staph aureus) culture positive, Osteoarthritis of knee, Parkinson disease ([address] Proteinuria, Skull fracture ([address] Temporary loss of eyesight, and Tubular adenoma of colon. has a past surgical history that includes Colonoscopy (2012); shoulder surgery (Right); pr colsc flx w/rmvl of tumor polyp lesion snare tq (N/A, 2017); and Colonoscopy (2017). Restrictions  Restrictions/Precautions  Restrictions/Precautions: Fall Risk  Required Braces or Orthoses?: No  Implants present? :  (stent in kidney)  Subjective   General  Chart Reviewed: Yes  Response To Previous Treatment: Patient with no complaints from previous session.   Family / Caregiver Present: No  Subjective  Subjective: Pt cooperative and agrees to PT. Pt c/o burning sensation in chest that will come and go. C/o of headache  Pain Screening  Patient Currently in Pain: Denies  Vital Signs  Patient Currently in Pain: Denies  Oxygen Therapy  SpO2: 99 %  Pulse Oximeter Device Mode: Intermittent  Pulse Oximeter Device Location: Finger;Right  O2 Device: None (Room air)       Orientation  Orientation  Overall Orientation Status: Within Functional Limits  Cognition      Objective   Bed mobility  Bridging: Stand by assistance  Scooting: Modified independent   Sit to Stand: Stand by assistance  Stand to sit: Stand by assistance  Transfers  Sit to Stand: Contact guard assistance  Stand to sit: Contact guard assistance  Ambulation  Ambulation?: Yes  Ambulation 1  Surface: level tile  Device: Single point cane  Assistance: Contact guard assistance  Gait Deviations: Shuffles;Decreased step length;Decreased step height  Distance: 50ft  Comments: Parkinsons gait  Stairs/Curb  Stairs?: No     Balance  Posture: Fair  Sitting - Static: Fair;+  Sitting - Dynamic: Fair  Standing - Static: Fair  Standing - Dynamic: Fair  Comments:  Forward trunk flexion   Other exercises  Other exercises 1: Seated BLE exercises 10x  Other exercises 2: sit <> stands x 5         Other Activities: Other (see comment)  Comment: Pt had bowel movement during treatment       Goals  Short term goals  Time Frame for Short term goals: 3-5 days  Short term goal 1: mod-I transfers  Short term goal 2: mod-I gait with cane x 100ft  Short term goal 3: negotiate 5 steps with rail/cane mod-I    Plan    Plan  Times per week: 5-6x/wk  Current Treatment Recommendations: Balance Training, Functional Mobility Training, Gait Training, Stair training, Endurance Training  Safety Devices  Type of devices: Call light within reach, Bed alarm in place, Left in bed, Patient at risk for falls     Therapy Time   Individual Concurrent Group Co-treatment   Time In 0928         Time Out 1010         Minutes [address], PTA

## 2021-09-07 VITALS
WEIGHT: 171.52 LBS | RESPIRATION RATE: 16 BRPM | SYSTOLIC BLOOD PRESSURE: 138 MMHG | TEMPERATURE: 97.5 F | BODY MASS INDEX: 26.92 KG/M2 | HEIGHT: 67 IN | DIASTOLIC BLOOD PRESSURE: 68 MMHG | HEART RATE: 62 BPM | OXYGEN SATURATION: 100 %

## 2021-09-07 LAB
ANION GAP SERPL CALCULATED.3IONS-SCNC: 9 MMOL/L (ref 9–17)
BUN BLDV-MCNC: 16 MG/DL (ref 8–23)
BUN/CREAT BLD: ABNORMAL (ref 9–20)
CALCIUM SERPL-MCNC: 9.1 MG/DL (ref 8.6–10.4)
CHLORIDE BLD-SCNC: 98 MMOL/L (ref 98–107)
CO2: 25 MMOL/L (ref 20–31)
CREAT SERPL-MCNC: 1.3 MG/DL (ref 0.7–1.2)
GFR AFRICAN AMERICAN: >60 ML/MIN
GFR NON-AFRICAN AMERICAN: 53 ML/MIN
GFR SERPL CREATININE-BSD FRML MDRD: ABNORMAL ML/MIN/{1.73_M2}
GFR SERPL CREATININE-BSD FRML MDRD: ABNORMAL ML/MIN/{1.73_M2}
GLUCOSE BLD-MCNC: 111 MG/DL (ref 70–99)
HCT VFR BLD CALC: 43 % (ref 41–53)
HEMOGLOBIN: 14.5 G/DL (ref 13.5–17.5)
MCH RBC QN AUTO: 32.1 PG (ref 26–34)
MCHC RBC AUTO-ENTMCNC: 33.8 G/DL (ref 31–37)
MCV RBC AUTO: 94.9 FL (ref 80–100)
NRBC AUTOMATED: ABNORMAL PER 100 WBC
PDW BLD-RTO: 15.1 % (ref 11.5–14.9)
PLATELET # BLD: 179 K/UL (ref 150–450)
PMV BLD AUTO: 6.7 FL (ref 6–12)
POTASSIUM SERPL-SCNC: 4.4 MMOL/L (ref 3.7–5.3)
RBC # BLD: 4.53 M/UL (ref 4.5–5.9)
SODIUM BLD-SCNC: 132 MMOL/L (ref 135–144)
WBC # BLD: 5.5 K/UL (ref 3.5–11)

## 2021-09-07 PROCEDURE — 80048 BASIC METABOLIC PNL TOTAL CA: CPT

## 2021-09-07 PROCEDURE — 2580000003 HC RX 258: Performed by: FAMILY MEDICINE

## 2021-09-07 PROCEDURE — 6370000000 HC RX 637 (ALT 250 FOR IP): Performed by: FAMILY MEDICINE

## 2021-09-07 PROCEDURE — 36415 COLL VENOUS BLD VENIPUNCTURE: CPT

## 2021-09-07 PROCEDURE — 97116 GAIT TRAINING THERAPY: CPT

## 2021-09-07 PROCEDURE — 97530 THERAPEUTIC ACTIVITIES: CPT

## 2021-09-07 PROCEDURE — 6360000002 HC RX W HCPCS: Performed by: FAMILY MEDICINE

## 2021-09-07 PROCEDURE — 97535 SELF CARE MNGMENT TRAINING: CPT

## 2021-09-07 PROCEDURE — 96372 THER/PROPH/DIAG INJ SC/IM: CPT

## 2021-09-07 PROCEDURE — 99239 HOSP IP/OBS DSCHRG MGMT >30: CPT | Performed by: FAMILY MEDICINE

## 2021-09-07 PROCEDURE — 85027 COMPLETE CBC AUTOMATED: CPT

## 2021-09-07 PROCEDURE — 97110 THERAPEUTIC EXERCISES: CPT

## 2021-09-07 RX ADMIN — CARBIDOPA AND LEVODOPA 1 TABLET: 25; 100 TABLET ORAL at 13:37

## 2021-09-07 RX ADMIN — PANTOPRAZOLE SODIUM 40 MG: 40 TABLET, DELAYED RELEASE ORAL at 05:51

## 2021-09-07 RX ADMIN — CARBIDOPA AND LEVODOPA 1 TABLET: 25; 100 TABLET ORAL at 10:11

## 2021-09-07 RX ADMIN — ALLOPURINOL 100 MG: 100 TABLET ORAL at 10:12

## 2021-09-07 RX ADMIN — SODIUM CHLORIDE, PRESERVATIVE FREE 10 ML: 5 INJECTION INTRAVENOUS at 10:16

## 2021-09-07 RX ADMIN — ACETAMINOPHEN 650 MG: 325 TABLET, FILM COATED ORAL at 01:16

## 2021-09-07 RX ADMIN — MAGNESIUM OXIDE TAB 400 MG (241.3 MG ELEMENTAL MG) 400 MG: 400 (241.3 MG) TAB at 10:12

## 2021-09-07 RX ADMIN — CLOPIDOGREL BISULFATE 75 MG: 75 TABLET ORAL at 10:12

## 2021-09-07 RX ADMIN — ENOXAPARIN SODIUM 40 MG: 40 INJECTION SUBCUTANEOUS at 10:11

## 2021-09-07 RX ADMIN — ROPINIROLE HYDROCHLORIDE 0.25 MG: 0.5 TABLET, FILM COATED ORAL at 10:11

## 2021-09-07 RX ADMIN — CARBIDOPA AND LEVODOPA 1 TABLET: 25; 100 TABLET ORAL at 16:38

## 2021-09-07 RX ADMIN — ROPINIROLE HYDROCHLORIDE 0.25 MG: 0.5 TABLET, FILM COATED ORAL at 15:57

## 2021-09-07 ASSESSMENT — PAIN DESCRIPTION - DESCRIPTORS: DESCRIPTORS: HEADACHE

## 2021-09-07 ASSESSMENT — PAIN DESCRIPTION - PAIN TYPE
TYPE: ACUTE PAIN
TYPE: ACUTE PAIN

## 2021-09-07 ASSESSMENT — PAIN DESCRIPTION - LOCATION
LOCATION: HEAD
LOCATION: HEAD;NECK

## 2021-09-07 ASSESSMENT — PAIN SCALES - GENERAL
PAINLEVEL_OUTOF10: 0
PAINLEVEL_OUTOF10: 5

## 2021-09-07 ASSESSMENT — PAIN SCALES - WONG BAKER
WONGBAKER_NUMERICALRESPONSE: 2
WONGBAKER_NUMERICALRESPONSE: 2

## 2021-09-07 NOTE — PLAN OF CARE
Problem: Infection:  Goal: Will remain free from infection  Description: Will remain free from infection  9/7/2021 0131 by Brent Mcgarry RN  Outcome: Ongoing     Problem: Safety:  Goal: Free from accidental physical injury  Description: Free from accidental physical injury  9/7/2021 0131 by Brent Mcgarry RN  Outcome: Ongoing     Problem: Safety:  Goal: Free from intentional harm  Description: Free from intentional harm  Outcome: Ongoing     Problem: Daily Care:  Goal: Daily care needs are met  Description: Daily care needs are met  Outcome: Ongoing     Problem: Pain:  Goal: Patient's pain/discomfort is manageable  Description: Patient's pain/discomfort is manageable  9/7/2021 0131 by Brent Mcgarry RN  Outcome: Ongoing     Problem: Pain:  Goal: Pain level will decrease  Description: Pain level will decrease  Outcome: Ongoing     Problem: Pain:  Goal: Control of acute pain  Description: Control of acute pain  Outcome: Ongoing     Problem: Pain:  Goal: Control of chronic pain  Description: Control of chronic pain  Outcome: Ongoing

## 2021-09-07 NOTE — PROGRESS NOTES
Physical Therapy  Facility/Department: [facility] PROGRESSIVE CARE  Daily Treatment Note  NAME: Susan Nino  : 1940  MRN: [id number]    Date of Service: 2021    Discharge Recommendations:  Patient would benefit from continued therapy after discharge   PT Equipment Recommendations  Equipment Needed: No    Assessment   Body structures, Functions, Activity limitations: Decreased functional mobility ; Decreased balance;Decreased endurance  History: Parkinsons  REQUIRES PT FOLLOW UP: Yes  Activity Tolerance  Activity Tolerance: Patient limited by endurance; Patient limited by fatigue     Patient Diagnosis(es): The primary encounter diagnosis was General weakness. A diagnosis of Symptomatic bradycardia was also pertinent to this visit. 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 has a past surgical history that includes Colonoscopy (2012); shoulder surgery (Right); pr colsc flx w/rmvl of tumor polyp lesion snare tq (N/A, 2017); and Colonoscopy (2017). Restrictions  Restrictions/Precautions  Restrictions/Precautions: Fall Risk  Required Braces or Orthoses?: No  Implants present? :  (stent in kidney)  Subjective   General  Chart Reviewed: Yes  Response To Previous Treatment: Patient with no complaints from previous session. Family / Caregiver Present: No  Subjective  Subjective: Patient initially wanting to finish his breakfast before participating.  Writer checked back later and patient will to participate   General Comment  Comments: MARCO ANTONIO Ramos approved therapy. Co-treat with MAVERICK Hamilton  Pain Screening  Patient Currently in Pain: Yes  Pain Assessment  Pain Assessment: Faces  Mcpherson-Baker Pain Rating: Hurts a little bit  Pain Type: Acute pain  Pain Location: Head  Pain Descriptors: Headache  Vital Signs  Patient Currently in Pain: Yes  Oxygen Therapy  O2 Device: None (Room air)       Orientation  Orientation  Overall Orientation Status: Within Functional Limits  Objective   Bed mobility  Rolling to Left: Stand by assistance  Rolling to Right: Stand by assistance  Supine to Sit: Stand by assistance  Sit to Supine: Unable to assess (patient left up in bedside chair)  Scooting: Stand by assistance  Transfers  Sit to Stand: Stand by assistance  Stand to sit: Stand by assistance  Bed to Chair: Stand by assistance  Comment: Anterior lean present with transfers.    Ambulation  Ambulation?: Yes  Ambulation 1  Surface: level tile  Device: Single point cane  Assistance: Contact guard assistance  Gait Deviations: Shuffles;Decreased step length;Decreased step height  Distance: 40ft  Comments: Parkinsons gait  Stairs/Curb  Stairs?: Yes  Stairs  # Steps : 6  Stairs Height: 8\"  Rails: None  Device: Single pt cane  Assistance: Contact guard assistance  Comment: Patient completed in step to pattern         Other exercises  Other exercises?: Yes  Other exercises 1: seated B LE exercises x20 reps   Other exercises 2: sit to stand x3   Other exercises 3: bed mobility      Goals  Short term goals  Time Frame for Short term goals: 3-5 days  Short term goal 1: mod-I transfers  Short term goal 2: mod-I gait with cane x 100ft  Short term goal 3: negotiate 5 steps with rail/cane mod-I    Plan    Plan  Times per week: 5-6x/wk  Current Treatment Recommendations: Balance Training, Functional Mobility Training, Gait Training, Stair training, Endurance Training  Safety Devices  Type of devices: Call light within reach, Patient at risk for falls, Chair alarm in place, Gait belt, Left in chair 09/07/21 0840   PT Individual Minutes   Time In 0813   Time Out 0836   Minutes 23     Electronically signed by Kyle Rader PTA on 9/7/21 at 8:50 AM EDT

## 2021-09-07 NOTE — PROGRESS NOTES
FAMILY MEDICINE  - PROGRESS NOTE    Date:  9/7/2021  Rachna Bahena  [id number]      Chief Complaint   Patient presents with    Nausea    Fatigue    Generalized Body Aches         Interval History:  not changed much, his heart rate is better without the Metoprolol and his BP is controlled. His stress test was negative. He denies any chest pain. Specialists notes, labs & imaging reviewed.       Subjective  Constitutional: positive for fatigue and overweight  Eyes: positive for contacts/glasses  Ears, nose, mouth, throat, and face: negative  Respiratory: negative  Cardiovascular: negative  Gastrointestinal: positive for reflux symptoms and diverticulosis  Hematologic/lymphatic: negative  Musculoskeletal:positive for arthralgias, muscle weakness, myalgias and stiff joints  Neurological: positive for gait problems, headaches, tremors and weakness  Behavioral/Psych: positive for depression:    Objective:    BP (!) 120/57   Pulse 57   Temp 97.7 °F (36.5 °C) (Axillary)   Resp 14   Ht 5' 7\" (1.702 m)   Wt 171 lb 8.3 oz (77.8 kg)   SpO2 97%   BMI 26.86 kg/m²   General appearance - alert, well appearing, and in no distress and overweight  Mental status - alert, oriented to person, place, and time  Eyes - pupils equal and reactive, extraocular eye movements intact  Ears - hearing grossly normal bilaterally  Nose - normal and patent, no erythema, discharge or polyps  Mouth - mucous membranes moist, pharynx normal without lesions  Neck - supple, no significant adenopathy  Lymphatics - no palpable lymphadenopathy, no hepatosplenomegaly  Chest - clear to auscultation, no wheezes, rales or rhonchi, symmetric air entry  Heart - bradycardic rate, regular rhythm, normal S1, S2, no murmurs, rubs, clicks or gallops  Abdomen - soft, nontender, nondistended, no masses or organomegaly  Breasts - not examined  Back exam - not examined  Neurological - neck supple without rigidity, abnormal neurological exam unchanged from prior examinations  Musculoskeletal - no joint tenderness, deformity or swelling  Extremities - peripheral pulses normal, no pedal edema, no clubbing or cyanosis  Skin - normal coloration and turgor, no rashes, no suspicious skin lesions noted    Data:   Medications:   Current Facility-Administered Medications   Medication Dose Route Frequency Provider Last Rate Last Admin    sodium chloride flush 0.9 % injection 10 mL  10 mL IntraVENous PRN Danii Perez MD   10 mL at 09/03/21 0721    sodium chloride flush 0.9 % injection 5-40 mL  5-40 mL IntraVENous 2 times per day Leonarda Starr MD   10 mL at 09/06/21 1957    sodium chloride flush 0.9 % injection 5-40 mL  5-40 mL IntraVENous PRN Leonarda Starr MD        0.9 % sodium chloride infusion  25 mL IntraVENous PRN Leonarda Starr MD        enoxaparin (LOVENOX) injection 40 mg  40 mg SubCUTAneous Daily Leonarda Starr MD   40 mg at 09/06/21 1018    ondansetron (ZOFRAN-ODT) disintegrating tablet 4 mg  4 mg Oral Q8H PRN Leonarda Starr MD        Or    ondansetron [facility]) injection 4 mg  4 mg IntraVENous Q6H PRN Leonarda Starr MD        polyethylene glycol (GLYCOLAX) packet 17 g  17 g Oral Daily PRN Leonarda Starr MD        acetaminophen (TYLENOL) tablet 650 mg  650 mg Oral Q6H PRN Leonarda Starr MD   650 mg at 09/07/21 0116    Or    acetaminophen (TYLENOL) suppository 650 mg  650 mg Rectal Q6H PRN Leonarda Starr MD        nitroGLYCERIN (NITROSTAT) SL tablet 0.4 mg  0.4 mg SubLINGual Q5 Min PRN Leonarda Starr MD        atorvastatin (LIPITOR) tablet 40 mg  40 mg Oral Daily Leonarda Starr MD   40 mg at 09/06/21 1953    allopurinol (ZYLOPRIM) tablet 100 mg  100 mg Oral Daily Leonarda Starr MD   100 mg at 09/06/21 1030    butalbital-APAP-caffeine -40 MG per capsule 1 capsule  1 capsule Oral Q6H PRN Leonarda Starr MD   1 capsule at 09/06/21 1252    carbidopa-levodopa (SINEMET)  MG per tablet 1 tablet  1 tablet Oral 4x Daily Leonarda Starr MD   1 tablet at 09/06/21 1952    clopidogrel (PLAVIX) tablet 75 mg  75 mg Oral Daily Leonarda Starr MD   75 mg at 09/06/21 1019    pantoprazole (PROTONIX) tablet 40 mg  40 mg Oral QAM AC Leonarda Starr MD   40 mg at 09/07/21 0551    rOPINIRole (REQUIP) tablet 0.25 mg  0.25 mg Oral TID Leonarda Starr MD   0.25 mg at 09/06/21 1953    magnesium oxide (MAG-OX) tablet 400 mg  400 mg Oral Daily Leonarda Starr MD   400 mg at 09/06/21 1023       Intake/Output Summary (Last 24 hours) at 9/7/2021 0635  Last data filed at 9/7/2021 0552  Gross per 24 hour   Intake 240 ml   Output 1575 ml   Net -1335 ml     Recent Results (from the past 24 hour(s))   CBC    Collection Time: 09/07/21  5:49 AM   Result Value Ref Range    WBC 5.5 3.5 - 11.0 k/uL    RBC 4.53 4.5 - 5.9 m/uL    Hemoglobin 14.5 13.5 - 17.5 g/dL    Hematocrit 43.0 41 - 53 %    MCV 94.9 80 - 100 fL    MCH 32.1 26 - 34 pg    MCHC 33.8 31 - 37 g/dL    RDW 15.1 (H) 11.5 - 14.9 %    Platelets 907 495 - 922 k/uL    MPV 6.7 6.0 - 12.0 fL    NRBC Automated NOT REPORTED per 100 WBC   Basic Metabolic Panel    Collection Time: 09/07/21  5:49 AM   Result Value Ref Range    Glucose 111 (H) 70 - 99 mg/dL    BUN 16 8 - 23 mg/dL    CREATININE 1.30 (H) 0.70 - 1.20 mg/dL    Bun/Cre Ratio NOT REPORTED 9 - 20    Calcium 9.1 8.6 - 10.4 mg/dL    Sodium 132 (L) 135 - 144 mmol/L    Potassium 4.4 3.7 - 5.3 mmol/L    Chloride 98 98 - 107 mmol/L    CO2 25 20 - 31 mmol/L    Anion Gap 9 9 - 17 mmol/L    GFR Non-African American 53 (L) >60 mL/min    GFR African American >60 >60 mL/min    GFR Comment          GFR Staging NOT REPORTED      -----------------------------------------------------------------  RAD:  EKG:  Micro:     Assessment & Plan:    Patient Active Problem List:     Temporary loss of eyesight     Impaired renal function     Syncope : posterior circulation stroke vs Neurocardiogenic syncope      Intracranial bleed : traumatic left sub-dural hematoma ,managed conservatively in 2011     Headache     CKD (chronic kidney disease) stage 3, GFR 30-59 ml/min ([facility])     Depression     Hyperlipidemia     Microhematuria     Microalbuminuria     Essential hypertension     Generalized abdominal pain     Tubular adenoma of colon     Diverticulosis of colon     History of skull fracture     NSTEMI (non-ST elevated myocardial infarction) ([facility])     Nausea     Pure hypercholesterolemia     Prediabetes     Parkinson disease ([facility])     Chronic post-traumatic headache, not intractable     Fall (on) (from) other stairs and steps, initial encounter     Strain of iliopsoas muscle, initial encounter     Acute pain of left knee     Closed fracture of second toe of right foot     Closed fracture of second toe of left foot     Abnormal ECG     Cerebrovascular accident ([facility] [address]     Chest pain, unspecified     Hematoma of scalp     Left ventricular hypertrophy     Mild aortic valve regurgitation     Pulmonary hypertension, mild ([facility])     Lumbar radiculopathy     Dizziness     Hyponatremia     Vomiting     General weakness     Overweight (BMI 25.0-29. 9)     Frequent falls     Symptomatic bradycardia           Plan:  -Symptomatic bradycardia - improved off of Metoprolol, stress test negative, Cardiology following and recommends outpatient Holter monitor.  -Parkinson's disease - unchanged, on Sinemet & Requip.  -Generalized weakness - pre-cert approved for SNF for rehab.  -Okay to D/C to SNF. -Complete orders per chart.     See orders   Disposition:    Electronically signed by Sherine Alexander MD on 9/7/2021 at 6:35 AM

## 2021-09-07 NOTE — CARE COORDINATION
LUKAS spoke with Laury Mohr in admissions at [facility] [place] [place] and did ask her to start this pre-cert on this date. Laury Mohr agreed to get the pre-cert started.

## 2021-09-07 NOTE — PROGRESS NOTES
LUKAS informed of discharge. Abel Beck was able to obtain pre-cert. Orders sent to facility. 7000 completed. Message left for pt's son Oleksandr Muir.

## 2021-09-07 NOTE — PROGRESS NOTES
Nutrition Assessment     Type and Reason for Visit: Initial (6 day)    Nutrition Recommendations/Plan: Will continue Regular diet     Nutrition Assessment:  Pt was admitted due to bradycardia. He has been consistently consuming more than 50% of food provided on Regular diet. Intake is adequate. Plan is for discharge to [facility]. Malnutrition Assessment:  Malnutrition Status: No malnutrition    Current Nutrition Therapies:    ADULT DIET;  Regular    Anthropometric Measures:  · Height: 5' 7\" (170.2 cm)  · Current Body Wt: 171 lb (77.6 kg)   · BMI: 26.8    Nutrition Diagnosis:   No nutrition diagnosis at this time     Nutrition Interventions:   Food and/or Nutrient Delivery:  Continue Current Diet  Food/Nutrient Intake Outcomes:  Food and Nutrient Intake    Discharge Planning:    Continue current diet     Electronically signed by Jenny Winn RD, CHAZ on 9/7/21 at 1:24 PM EDT    Contact: [phone]

## 2021-09-07 NOTE — CARE COORDINATION
LUKAS noted that there is an updated PT note for this patient. LUKAS called Bebe Bhandari OT and did request that this patient get seen as soon as possible for pre-cert requirements. ADD:  LUKAS did ask Dallin Ochoa in admissions with [facility] to start this pre-cert as updated OT note is now in chart.

## 2021-09-07 NOTE — PROGRESS NOTES
Pt transferred from PCU. Writer orientated pt to room. Head to toe assessment completed. Telemetry monitor placed. Pt. Alert and oriented and in no distress. Call light in reach. Will continue to monitor.

## 2021-09-07 NOTE — PROGRESS NOTES
[address] [facility]    INPATIENT OCCUPATIONAL THERAPY  PROGRESS NOTE  Date: 2021  Patient Name: Cecy Hanna      Room: 4254/8415-21  MRN: [id number]    : 1940  (79 y.o.) Gender: male     Discharge Recommendations:  Further Occupational Therapy is recommended upon facility discharge. Equipment Needed:  (TBD)    Referring Practitioner: Patti Francis MD  Diagnosis: General weakness  General  Chart Reviewed: Yes  Patient assessed for rehabilitation services?: Yes  Family / Caregiver Present: No  Referring Practitioner: Patti Francis MD  Diagnosis: General weakness    Restrictions  Restrictions/Precautions: Fall Risk  Implants present? :  (stent in kidney)  Required Braces or Orthoses?: No      Subjective  Subjective: Co treat with PTA. Pt in bed eating breakfeast upon arrivial, pt stated he would like to finish before therapy. Returned later pt was alert in bed ready for therapy. Comments: Ok per [employer] for OT/PT  Overall Orientation Status: Within Functional Limits     Pain Assessment  Pain Assessment: Faces  Mcpherson-Baker Pain Rating: Hurts a little bit    Objective     Bed mobility  Rolling to Left: Stand by assistance  Rolling to Right: Stand by assistance  Supine to Sit: Stand by assistance  Sit to Supine: Unable to assess  Scooting: Stand by assistance  Comment: Pt completed bed mobility with no signs of fatigue. HOB elevated. Balance  Sitting Balance: Stand by assistance  Standing Balance: Contact guard assistance  Standing Balance  Time: 1-2 minutes  Activity: functional mobility  Comment: with cane. Pt demonstrated slight anterior lean when initially standing  Functional Mobility  Functional - Mobility Device: Cane  Activity: Other (Room mobility)  Assist Level: Contact guard assistance  Functional Mobility Comments: Verbal cues for safety.    ADL  Feeding: Setup  Grooming: Setup  UE Bathing: Stand by assistance  LE Bathing: Contact guard assistance  UE Dressing: Stand by assistance  LE Dressing: Stand by assistance (while seated in chair)  Toileting: Contact guard assistance  Additional Comments: ADL scores based on clinical reasoning and skilled observation unless otherwise noted. Pt able to don briefs while sitting in chair utilizing figure 4 technique. Pt verbalized good safety techniques when questioned. Transfers  Sit to stand: Stand by assistance  Stand to sit: Stand by assistance                             Assessment  Performance deficits / Impairments: Decreased ADL status; Decreased functional mobility ; Decreased strength;Decreased safe awareness;Decreased endurance;Decreased balance;Decreased high-level IADLs  Prognosis: Good  Discharge Recommendations: Patient would benefit from continued therapy after discharge  Activity Tolerance: Patient Tolerated treatment well  Safety Devices in place: Yes  Type of devices: All fall risk precautions in place; Bed alarm in place; Chair alarm in place;Gait belt;Patient at risk for falls; Left in bed;Nurse notified             Patient Education:     Darling Colmenares  Method: explanation       Outcome: asked questions     Plan  Safety Devices  Safety Devices in place: Yes  Type of devices:  All fall risk precautions in place, Bed alarm in place, Chair alarm in place, Gait belt, Patient at risk for falls, Left in bed, Nurse notified  Plan  Times per week: 4-6  Current Treatment Recommendations: Self-Care / ADL, Strengthening, ROM, Balance Training, Functional Mobility Training, Endurance Training, Safety Education & Training, Patient/Caregiver Education & Training, Equipment Evaluation, Education, & procurement, Home Management Training      Goals  Short term goals  Time Frame for Short term goals: By discharge  Short term goal 1: Pt will complete lower body dressing/bathing with supervision and good safety  Short term goal 2: Pt will complete functional transfers/mobility during self care tasks with SBA and good safety  Short term goal 3: Pt will tolerate standing 5+ minutes during funcitonal activity of choice with SBA and good safety  Short term goal 4: Pt will verbalize/demonstrate good understanding of fall prevention strategies to increase safety and independence with self care tasks  Short term goal 5: Pt will participate in 15+ minutes of therapeutic exercises/functional activities to increase safety and independence with self care tasks    OT Individual Minutes  Attempted: 7:51-7:57 refused to finish breakfast    Time In: 0813  Time Out: [facility]  Minutes: 23      Electronically signed by TRANG Hoffman on 9/7/21 at 9:02 AM EDT

## 2021-09-08 ENCOUNTER — CARE COORDINATION (OUTPATIENT)
Dept: CARE COORDINATION | Age: 81
End: 2021-09-08

## 2021-09-08 NOTE — CARE COORDINATION
Patient was DC to [facility] SN[facility]. ACM will remove self from care team until home.

## 2021-09-10 NOTE — DISCHARGE SUMMARY
[facility] Discharge Summary      Patient ID: Rachna Bahena    MRN: [id number]     Acct:  [de-identified]       Patient's PCP: JW Iniguez CNP    Admit Date: 9/1/2021     Discharge Date: 9/7/2021      Admitting Physician: Kaylee Marsh MD    Discharge Physician: Kaylee Marsh MD     Discharge Diagnoses:    Primary Problem  Symptomatic bradycardia    Active Hospital Problems    Diagnosis Date Noted    Symptomatic bradycardia [R00.1] 09/01/2021    Overweight (BMI 25.0-29. 9) [E66.3] 08/16/2021    Frequent falls [R29.6]     General weakness [R53.1] 08/13/2021    Left ventricular hypertrophy [I51.7] 10/01/2020    Chronic post-traumatic headache, not intractable [G44.329] 01/16/2020    Parkinson disease ([facility] [address] Joan Farfan 01/16/2020    Prediabetes [R73.03] 01/16/2020    Pulmonary hypertension, mild (HCC) [I27.20] 08/31/2017    Essential hypertension [I10] 03/23/2015    Hyperlipidemia [E78.5] 08/27/2014    Depression [F32.9] 04/07/2014    CKD (chronic kidney disease) stage 3, GFR 30-59 ml/min (HCC) [N18.30] 03/04/2014     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 ([address] 3/18/2011    d/t being hurt on the job    CKD (chronic kidney disease) stage 2, GFR 60-89 ml/min     CKD (chronic kidney disease) stage 3, GFR 30-59 ml/min (HCC)     Diverticulosis of colon     GERD (gastroesophageal reflux disease)     Impaired renal function     MRSA (methicillin resistant staph aureus) culture positive 9/23/2015    leg    Osteoarthritis of knee     Left    Parkinson disease ([address]     Proteinuria     Skull fracture ([address] 3/18/2011    d/t 12-foot drop on the job    Temporary loss of eyesight     periodically, happened x7    [facility] 2012[facility]     The patient was seen and examined on day of discharge and this discharge summary is in conjunction with any daily progress note from day of discharge.     Code Status: Prior    Hospital Course: uncomplicated    Consults:  cardiology    Significant Diagnostic Studies: as above, and as follows: see chart    Treatments: as above    Disposition: SNF    Discharged Condition: Stable    Follow Up:  JW Rodriguez CNP in one week after D/C from SNF. Discharge Medications:    Lilian Cruz   Home Medication Instructions B:[id number]    Printed on:09/09/21 7486   Medication Information                      acetaminophen (TYLENOL 8 HOUR ARTHRITIS PAIN) 650 MG extended release tablet  Take 1 tablet by mouth every 8 hours as needed for Pain             allopurinol (ZYLOPRIM) 100 MG tablet  Take 1 tablet by mouth daily             atorvastatin (LIPITOR) 40 MG tablet  take 1 tablet by mouth once daily             butalbital-acetaminophen-caffeine (FIORICET, ESGIC) -40 MG per tablet  Take 1 tab prn only for severe headache. Can repeat after 4 hrs if needed. Max 2 tabs/24 hrs. Max 4 tabs/week             carbidopa-levodopa (SINEMET)  MG per tablet  Take 1 tablet by mouth 4 times daily             clopidogrel (PLAVIX) 75 MG tablet  Take 1 tablet by mouth daily             esomeprazole (NEXIUM) 20 MG delayed release capsule  Take 1 capsule by mouth every morning (before breakfast)             magnesium oxide (MAG-OX) 400 (241.3 Mg) MG TABS tablet  Take 1 tablet by mouth daily             magnesium oxide (MAG-OX) 400 MG tablet  Take 1 tablet by mouth daily             nitroGLYCERIN (NITROSTAT) 0.4 MG SL tablet  Place 1 tablet under the tongue every 5 minutes as needed for Chest pain If no relief of chest pain after 3rd dose, go to the ER or call 911             rOPINIRole (REQUIP) 0.25 MG tablet  Take 0.25 mg by mouth 3 times daily                  Activity: activity as tolerated    Diet: cardiac diet    Time Spent on discharge is more than 40 minutes in the examination, evaluation, counseling and review of medications and discharge plan.       Electronically signed by Hailee Mustafa MD on 9/9/2021 at 11:09 PM

## 2021-09-14 ENCOUNTER — TELEPHONE (OUTPATIENT)
Dept: PHARMACY | Facility: CLINIC | Age: 81
End: 2021-09-14

## 2021-09-14 NOTE — TELEPHONE ENCOUNTER
[facility] HEALTH CLINICAL PHARMACY REVIEW: ADHERENCE REVIEW  Identified care gap per Romeo Dia; fills at [address]: Statin adherence    Last Visit: 7/20/21    Patient found in Outcomes MTM and is not currently eligible for CMR/TIP      STATIN ADHERENCE    Per Insurance Records through 8/13/21Filled only once; Potential Fail Date: 9/19/21):   ATORVASTATIN TAB 40MG last filled on 5/5/21 for 90 day supply. Next refill due: 8/3/21    Per Reconciled Dispense Report:  ATORVASTATIN TAB 40MG last filled on 8/24/21 for 4 day supply. Per [facility]:   ATORVASTATIN TAB 40MG per provider on 6/14/21 office visit medications switched to this pharmacy for pill packs due to patient confusion on taking medication. Per [facility] the pill packs would be $47.00 each month. Patient is working with a care coordinator    Lab Results   Component Value Date    CHOL 101 07/20/2020    TRIG 70 07/20/2020    HDL 54 05/11/2021    LDLCHOLESTEROL 92 05/11/2021    LDLCALC 58 01/08/2018     ALT   Date Value Ref Range Status   09/01/2021 29 5 - 41 U/L Final     AST   Date Value Ref Range Status   09/01/2021 22 <40 U/L Final     The ASCVD Risk score (Margarito Zuluaga., et al., 2013) failed to calculate for the following reasons: The 2013 ASCVD risk score is only valid for ages 36 to 78    The patient has a prior MI or stroke diagnosis     PLAN  The following are interventions that have been identified:   Per Care Coordination in patient chart. patient transferred from hospital to [facility]    Care Coor Phone Call    Jimmy Live RN at 9/8/2021  8:21 AM    Status: Signed      Patient was DC to [facility] SNF. ACM will remove self from care team until home. No patient out reach planned at this time. No future appointments. [address] [place].   [facility] free: [phone]

## 2021-09-21 ENCOUNTER — CARE COORDINATION (OUTPATIENT)
Dept: CARE COORDINATION | Age: 81
End: 2021-09-21

## 2021-09-21 NOTE — CARE COORDINATION
Attempted call to patient. There was no answer and the mailbox is full.     Plan of Care  [facility] [place], [facility]. will continue to reach out to patient

## 2021-10-04 ENCOUNTER — HOSPITAL ENCOUNTER (OUTPATIENT)
Age: 81
Setting detail: SPECIMEN
Discharge: HOME | End: 2021-10-04
Payer: MEDICARE

## 2021-10-04 DIAGNOSIS — E87.1 HYPONATREMIA: ICD-10-CM

## 2021-10-04 DIAGNOSIS — N18.31 STAGE 3A CHRONIC KIDNEY DISEASE (HCC): ICD-10-CM

## 2021-10-04 LAB
ABSOLUTE EOS #: 0.09 K/UL (ref 0–0.44)
ABSOLUTE IMMATURE GRANULOCYTE: 0.03 K/UL (ref 0–0.3)
ABSOLUTE LYMPH #: 1.42 K/UL (ref 1.1–3.7)
ABSOLUTE MONO #: 0.74 K/UL (ref 0.1–1.2)
ALBUMIN SERPL-MCNC: 4.4 G/DL (ref 3.5–5.2)
ALBUMIN/GLOBULIN RATIO: 1.5 (ref 1–2.5)
ALP BLD-CCNC: 109 U/L (ref 40–129)
ALT SERPL-CCNC: 12 U/L (ref 5–41)
ANION GAP SERPL CALCULATED.3IONS-SCNC: 12 MMOL/L (ref 9–17)
AST SERPL-CCNC: 21 U/L
BASOPHILS # BLD: 0 % (ref 0–2)
BASOPHILS ABSOLUTE: 0.04 K/UL (ref 0–0.2)
BILIRUB SERPL-MCNC: 0.33 MG/DL (ref 0.3–1.2)
BUN BLDV-MCNC: 12 MG/DL (ref 8–23)
BUN/CREAT BLD: ABNORMAL (ref 9–20)
CALCIUM SERPL-MCNC: 9.6 MG/DL (ref 8.6–10.4)
CHLORIDE BLD-SCNC: 104 MMOL/L (ref 98–107)
CO2: 22 MMOL/L (ref 20–31)
CREAT SERPL-MCNC: 1.3 MG/DL (ref 0.7–1.2)
DIFFERENTIAL TYPE: ABNORMAL
EOSINOPHILS RELATIVE PERCENT: 1 % (ref 1–4)
GFR AFRICAN AMERICAN: >60 ML/MIN
GFR NON-AFRICAN AMERICAN: 53 ML/MIN
GFR SERPL CREATININE-BSD FRML MDRD: ABNORMAL ML/MIN/{1.73_M2}
GFR SERPL CREATININE-BSD FRML MDRD: ABNORMAL ML/MIN/{1.73_M2}
GLUCOSE BLD-MCNC: 108 MG/DL (ref 70–99)
HCT VFR BLD CALC: 45 % (ref 40.7–50.3)
HEMOGLOBIN: 14.2 G/DL (ref 13–17)
IMMATURE GRANULOCYTES: 0 %
LYMPHOCYTES # BLD: 15 % (ref 24–43)
MCH RBC QN AUTO: 31.3 PG (ref 25.2–33.5)
MCHC RBC AUTO-ENTMCNC: 31.6 G/DL (ref 28.4–34.8)
MCV RBC AUTO: 99.1 FL (ref 82.6–102.9)
MONOCYTES # BLD: 8 % (ref 3–12)
NRBC AUTOMATED: 0 PER 100 WBC
PDW BLD-RTO: 14.6 % (ref 11.8–14.4)
PLATELET # BLD: 229 K/UL (ref 138–453)
PLATELET ESTIMATE: ABNORMAL
PMV BLD AUTO: 9.6 FL (ref 8.1–13.5)
POTASSIUM SERPL-SCNC: 4.8 MMOL/L (ref 3.7–5.3)
RBC # BLD: 4.54 M/UL (ref 4.21–5.77)
RBC # BLD: ABNORMAL 10*6/UL
SEG NEUTROPHILS: 76 % (ref 36–65)
SEGMENTED NEUTROPHILS ABSOLUTE COUNT: 6.88 K/UL (ref 1.5–8.1)
SODIUM BLD-SCNC: 138 MMOL/L (ref 135–144)
TOTAL PROTEIN: 7.3 G/DL (ref 6.4–8.3)
WBC # BLD: 9.2 K/UL (ref 3.5–11.3)
WBC # BLD: ABNORMAL 10*3/UL

## 2021-10-06 ENCOUNTER — CARE COORDINATION (OUTPATIENT)
Dept: CARE COORDINATION | Age: 81
End: 2021-10-06

## 2021-10-06 NOTE — CARE COORDINATION
PCP referral, patient is home from SNF    AC attempting to reach patient to assist in scheduling neurology appt and a 6 week follow up with PCP. No answer and VM is full.

## 2021-10-06 NOTE — CARE COORDINATION
PCP referral for assistance with scheduling and needs. Ambulatory Care Coordination Note  10/6/2021  CM Risk Score: 13  Charlson 10 Year Mortality Risk Score: 100%     ACC: Ashkan eMjia, RN    Summary Note: Spoke with patient explained PCP would like him to follow up with neurology and schedule a 6 week follow up with her. Patient stated that he gets lost going to the [address] [place] [address] Dr flores london. ACM mentioned that he went home and missed appt because he was frustrated. He believes Dr Farzana Ng office is too far for him. He stated he is \"getting goofy\" and should not drive that far. There is a referral in his chart for neurology in [place], in his PCP building and he would like to go there. ACM called and they will call to schedule patient but it may not be until after the new year. It will be with Dr. Taurus Sr. Patient is ok with waiting. [facility] will express this to PCP. [facility] offered to have Delfin JONES  contact him about transportation, he agreed. PCP appointment scheduled for 12/8 at 1230      Goals Addressed                 This Visit's Progress     Conditions and Symptoms   Improving     I will schedule office visits, as directed by my provider. I will keep my appointment or reschedule if I have to cancel. I will notify my provider of any barriers to my plan of care. I will follow my Zone Management tool to seek urgent or emergent care. I will notify my provider of any symptoms that indicate a worsening of my condition. Barriers: overwhelmed by complexity of regimen  Plan for overcoming my barriers: WORK WITH acm  Confidence: 7/10  Anticipated Goal Completion Date: 7.5.21                Prior to Admission medications    Medication Sig Start Date End Date Taking?  Authorizing Provider   magnesium oxide (MAG-OX) 400 (241.3 Mg) MG TABS tablet Take 1 tablet by mouth daily 9/5/21   Cha Mills MD   rOPINIRole (REQUIP) 0.25 MG tablet Take 0.25 mg by mouth 3 times daily    Historical Provider, MD nitroGLYCERIN (NITROSTAT) 0.4 MG SL tablet Place 1 tablet under the tongue every 5 minutes as needed for Chest pain If no relief of chest pain after 3rd dose, go to the ER or call 911 6/14/21   JW Cohen CNP   atorvastatin (LIPITOR) 40 MG tablet take 1 tablet by mouth once daily 6/14/21   JW Cohen CNP   allopurinol (ZYLOPRIM) 100 MG tablet Take 1 tablet by mouth daily 6/14/21   JW Cohen CNP   acetaminophen (TYLENOL 8 HOUR ARTHRITIS PAIN) 650 MG extended release tablet Take 1 tablet by mouth every 8 hours as needed for Pain 6/14/21   JW Cohen CNP   butalbital-acetaminophen-caffeine (FIORICET, ESGIC) -40 MG per tablet Take 1 tab prn only for severe headache. Can repeat after 4 hrs if needed. Max 2 tabs/24 hrs. Max 4 tabs/week  Patient not taking: Reported on 10/4/2021 6/14/21   JW Cohen CNP   carbidopa-levodopa (SINEMET)  MG per tablet Take 1 tablet by mouth 4 times daily 6/14/21 10/4/21  JW Cohen CNP   clopidogrel (PLAVIX) 75 MG tablet Take 1 tablet by mouth daily 6/14/21   JW Cohen CNP   esomeprazole (NEXIUM) 20 MG delayed release capsule Take 1 capsule by mouth every morning (before breakfast) 6/14/21   JW Cohen CNP   magnesium oxide (MAG-OX) 400 MG tablet Take 1 tablet by mouth daily 6/14/21   JW Cohen CNP       No future appointments.    and   General Assessment    Do you have any symptoms that are causing concern?: No

## 2021-10-07 ENCOUNTER — CARE COORDINATION (OUTPATIENT)
Dept: CARE COORDINATION | Age: 81
End: 2021-10-07

## 2021-10-07 NOTE — CARE COORDINATION
AMBER/Carlene Pang sent a message to the [facility] stating that the patient is in need of transportation. HC has attempted to reach the patient several times and unable to   reach.     Plan of Care  [facility] will attempt to reach out to patient again

## 2021-10-13 ENCOUNTER — CARE COORDINATION (OUTPATIENT)
Dept: CARE COORDINATION | Age: 81
End: 2021-10-13

## 2021-10-13 SDOH — ECONOMIC STABILITY: TRANSPORTATION INSECURITY
IN THE PAST 12 MONTHS, HAS LACK OF TRANSPORTATION KEPT YOU FROM MEETINGS, WORK, OR FROM GETTING THINGS NEEDED FOR DAILY LIVING?: YES

## 2021-10-13 SDOH — ECONOMIC STABILITY: TRANSPORTATION INSECURITY
IN THE PAST 12 MONTHS, HAS THE LACK OF TRANSPORTATION KEPT YOU FROM MEDICAL APPOINTMENTS OR FROM GETTING MEDICATIONS?: NO

## 2021-10-13 NOTE — CARE COORDINATION
Attempting to reach patient for a follow up care coordination call. Left detailed message for the patient to return my call with any questions or concerns.   Matthew Llamas RN, ambulatory care manager

## 2021-10-14 ENCOUNTER — CARE COORDINATION (OUTPATIENT)
Dept: CARE COORDINATION | Age: 81
End: 2021-10-14

## 2021-10-14 NOTE — CARE COORDINATION
HC received information from Ortravon Tam who stated that the patient would like for the [facility] to contact   Him. Patient informed ACM that he would like medical transportation and also would like to receive Home  delivered meals. HC attempted to contact this patient again and there was no answer, and his mailbox is full. .    Plan of Care  [facility] will attempt to reach out to this patient again

## 2021-10-14 NOTE — CARE COORDINATION
[facility] received return call from patient who left a VM requesting a call back. AC called back and had to leave a VM as well. Patient returned call. Per patient he is having his last home health visit today. He is still very weak. He is interested in outpatient PT to help him with strengthening. AC will route to PCP for referral and ask Delfin to help with transportation. He is still having head aches on and off. He is taking tylenol for them. He has not heard from [facility] on wheels as he thought he was going to. Will route [facility] as well.

## 2021-10-15 ENCOUNTER — CARE COORDINATION (OUTPATIENT)
Dept: CARE COORDINATION | Age: 81
End: 2021-10-15

## 2021-10-15 NOTE — CARE COORDINATION
HC attempted to phone patient to assist with transportation and meals. There was no answer, HC was able to leave a message for patient,  and provided contact information for a return call.     Plan of Care  [facility] [place], [facility]. will attempt to reach patient again if no response

## 2021-10-20 ENCOUNTER — TELEPHONE (OUTPATIENT)
Dept: PHARMACY | Facility: CLINIC | Age: 81
End: 2021-10-20

## 2021-10-20 NOTE — TELEPHONE ENCOUNTER
[facility] CLINICAL PHARMACY REVIEW: ADHERENCE REVIEW  Identified care gap per [employer]; fills at [facility]: Statin adherence    Last Visit: 10/04/21    Patient also appears to be prescribed: ATORVASTATIN TAB 40MG     Patient found in Outcomes MTM and is not currently eligible for CMR/TIP    ASSESSMENT  STATIN ADHERENCE    Per Insurance Records through [employer] (2020 [facility] = 0%; YTD [place] = 72%; Potential Fail Date: 10/25/21):   ATORVASTATIN TAB 40MG last filled on 09/21/21 for 30 day supply. Next refill due: 10/21/21    Per Reconciled Dispense Report:  ATORVASTATIN TAB 40MG last filled on 10/11/21 for 28 day supply. Per [facility]:   ATORVASTATIN TAB 40MG delivery is set to  Go out today on 10/20/21 for 90 day supply. Lab Results   Component Value Date    CHOL 101 07/20/2020    TRIG 70 07/20/2020    HDL 54 05/11/2021    LDLCHOLESTEROL 92 05/11/2021    LDLCALC 58 01/08/2018     ALT   Date Value Ref Range Status   10/04/2021 12 5 - 41 U/L Final     AST   Date Value Ref Range Status   10/04/2021 21 <40 U/L Final     The ASCVD Risk score (Brandy Livingston, et al., 2013) failed to calculate for the following reasons: The 2013 ASCVD risk score is only valid for ages 36 to 78    The patient has a prior MI or stroke diagnosis     PLAN  No patient out reach planned at this time.     Patient receive pill packs monthly and a new shipment is set to go out today      Future Appointments   Date Time Provider Shireen Charles   12/8/2021 12:30 PM Yennifer Arnold, [address] [facility] [address]  // Department, toll free [phone], [address]:  No   Time Spent (min): 20

## 2021-10-21 ENCOUNTER — CARE COORDINATION (OUTPATIENT)
Dept: CARE COORDINATION | Age: 81
End: 2021-10-21

## 2021-10-21 NOTE — CARE COORDINATION
Attempting to reach patient for a follow up care coordination call. Left detailed message for the patient to return my call with any questions or concerns.   Efra Gonsales RN, ambulatory care manager

## 2021-10-22 ENCOUNTER — CARE COORDINATION (OUTPATIENT)
Dept: CARE COORDINATION | Age: 81
End: 2021-10-22

## 2021-10-26 ENCOUNTER — CARE COORDINATION (OUTPATIENT)
Dept: CARE COORDINATION | Age: 81
End: 2021-10-26

## 2021-10-26 NOTE — CARE COORDINATION
Attempting to reach patient for a follow up care coordination call regarding any needs, questions or concerns. Rodrigo Crawley, [address]  Mail box is full.

## 2021-10-29 ENCOUNTER — CARE COORDINATION (OUTPATIENT)
Dept: CARE COORDINATION | Age: 81
End: 2021-10-29

## 2021-10-29 NOTE — CARE COORDINATION
HC attempted to contact patient to assist him with transportation and meals.     Plan of Care  [facility] OF [place], [facility]. will continue to try to reach patient

## 2021-11-01 ENCOUNTER — CARE COORDINATION (OUTPATIENT)
Dept: CARE COORDINATION | Age: 81
End: 2021-11-01

## 2021-11-01 NOTE — CARE COORDINATION
HC again attempted to contact the patient. There was no answer, HC left a message  with contact information for a return call.     Plan of Care  [facility] OF [place], [facility]. will continue to make attempts to reach this patient

## 2021-11-02 ENCOUNTER — CARE COORDINATION (OUTPATIENT)
Dept: CARE COORDINATION | Age: 81
End: 2021-11-02

## 2021-11-02 NOTE — CARE COORDINATION
Ambulatory Care Coordination Note  11/2/2021  CM Risk Score: 13  Charlson 10 Year Mortality Risk Score: 100%     ACC: Sabino Johnson RN    Patient's Plan of Care    Estuardo Status: Active Yes    Plan of care: Follow up transportation   Needs for home  Fatigue  PT    Per patient he is having phone issues and has not called ZEB Lomax back yet. He needs transportation so that we can get appointments scheduled. He understood and will call her back. He is still having fatigue and weakness and would like to try PT but needs transportation. ACM explained that we will start by working with Мария Iyers to get transportation arranged and then ACM will schedule necessary appointments. Next planned follow up call: 1 week      Care Coordination Interventions    Program Enrollment: Complex Care  Referral from Primary Care Provider: Yes  Suggested Interventions and Community Resources  Transportation Support: In Process  Zone Management Tools: In Process         Goals Addressed                 This Visit's Progress     Conditions and Symptoms   No change     I will schedule office visits, as directed by my provider. I will keep my appointment or reschedule if I have to cancel. I will notify my provider of any barriers to my plan of care. I will follow my Zone Management tool to seek urgent or emergent care. I will notify my provider of any symptoms that indicate a worsening of my condition. Barriers: overwhelmed by complexity of regimen  Plan for overcoming my barriers: WORK WITH acm  Confidence: 7/10  Anticipated Goal Completion Date: 7.5.21                Prior to Admission medications    Medication Sig Start Date End Date Taking?  Authorizing Provider   rOPINIRole (REQUIP) 0.25 MG tablet TAKE ONE (1) TABLET BY MOUTH THREE (3) TIMES DAILY  10/27/21   JW Yeboah - CNP   magnesium oxide (MAG-OX) 400 (241.3 Mg) MG TABS tablet Take 1 tablet by mouth daily 9/5/21   Gerda Toth MD   nitroGLYCERIN (NITROSTAT) 0.4 MG SL tablet Place 1 tablet under the tongue every 5 minutes as needed for Chest pain If no relief of chest pain after 3rd dose, go to the ER or call 911 6/14/21   JW Walker CNP   atorvastatin (LIPITOR) 40 MG tablet take 1 tablet by mouth once daily 6/14/21   JW Walker CNP   allopurinol (ZYLOPRIM) 100 MG tablet Take 1 tablet by mouth daily 6/14/21   JW Walker CNP   acetaminophen (TYLENOL 8 HOUR ARTHRITIS PAIN) 650 MG extended release tablet Take 1 tablet by mouth every 8 hours as needed for Pain 6/14/21   JW Walker CNP   butalbital-acetaminophen-caffeine (FIORICET, ESGIC) -40 MG per tablet Take 1 tab prn only for severe headache. Can repeat after 4 hrs if needed. Max 2 tabs/24 hrs.  Max 4 tabs/week  Patient not taking: Reported on 10/4/2021 6/14/21   JW Walker CNP   carbidopa-levodopa (SINEMET)  MG per tablet Take 1 tablet by mouth 4 times daily 6/14/21 10/4/21  JW Walker CNP   clopidogrel (PLAVIX) 75 MG tablet Take 1 tablet by mouth daily 6/14/21   JW Walker CNP   esomeprazole (NEXIUM) 20 MG delayed release capsule Take 1 capsule by mouth every morning (before breakfast) 6/14/21   JW Walker CNP   magnesium oxide (MAG-OX) 400 MG tablet Take 1 tablet by mouth daily 6/14/21   JW Walker CNP       Future Appointments   Date Time Provider Shireen Charles   12/8/2021 12:30 PM JW Walker [address]      and   General Assessment    Do you have any symptoms that are causing concern?: Yes  Progression since Onset: Unchanged  Reported Symptoms: Fatigue

## 2021-11-08 ENCOUNTER — CARE COORDINATION (OUTPATIENT)
Dept: CARE COORDINATION | Age: 81
End: 2021-11-08

## 2021-11-08 NOTE — CARE COORDINATION
HC attempted to contact this patient again today. Patient's mailbox is full. HC will attempt to reach patient at another time, to assist with medical transportation  and home delivered meals.     Plan of Care  [facility] OF [facility]. will attempt to reach out to patient in a few days

## 2021-11-08 NOTE — CARE COORDINATION
HC attempted to contact patient, there was no answer. HC left a message for   patient, stating that she will assist with medical transportation and home delivered  meals. HC  provided contact information for a return call.       Plan of Care  [facility] OF [place], [facility]. will follow up with patient if no return call

## 2021-11-09 ENCOUNTER — CARE COORDINATION (OUTPATIENT)
Dept: CARE COORDINATION | Age: 81
End: 2021-11-09

## 2021-11-09 NOTE — CARE COORDINATION
Attempting to reach patient for a follow up care coordination call regarding any needs, questions or concerns. Shahab Wilkes, AMBER [phone]  No answer, VM full.

## 2021-11-15 ENCOUNTER — CARE COORDINATION (OUTPATIENT)
Dept: CARE COORDINATION | Age: 81
End: 2021-11-15

## 2021-11-15 NOTE — CARE COORDINATION
Attempting to reach patient for a follow up care coordination call regarding any needs, questions or concerns.   Ad Listen, [address]  Mail box is full

## 2021-11-15 NOTE — CARE COORDINATION
Patient called back and stated he has an issue with his phone. He has not called Delfin back regarding assistance with transportation. He took her number and is going to call her today to hopefully start the process of transportation assistance. ACM explained once this is arranged ACM can set up needed appointments including PT and neurology for him. ACM encouraged patient to clear his VM so people can leave messages.

## 2021-11-15 NOTE — CARE COORDINATION
HC attempted again to contact patient. Patient's voicemail was full. HC left a message for patient with contact information for a return  call.     Plan of Care  [facility] OF [facility]. will attempt to reach patient at a later date

## 2021-11-17 ENCOUNTER — CARE COORDINATION (OUTPATIENT)
Dept: CARE COORDINATION | Age: 81
End: 2021-11-17

## 2021-11-17 NOTE — CARE COORDINATION
Patient called ACM stating that he \"doesn't know what is going on\". He is weak and feels like he can't keep a bunch of appointments anymore. ACM discussed assisted living or an ECF. He agreed to an appointment to discuss with PCP. [facility] will have PCP office contact patient to schedule an appointment.

## 2021-11-18 ENCOUNTER — CARE COORDINATION (OUTPATIENT)
Dept: CARE COORDINATION | Age: 81
End: 2021-11-18

## 2021-11-18 NOTE — CARE COORDINATION
Attempting to reach patient for a follow up care coordination call regarding any needs, questions or concerns. Would like to follow up on patient's symptoms from last ca. . Patient did schedule an appointment with PCP.    Monica Stewart, [address]

## 2021-11-18 NOTE — CARE COORDINATION
HC attempted to contact patient, there was no answer and  his mailbox was full.     Plan of Care  [facility] OF [place], [facility]. will continue to attempt to contact this patient

## 2021-11-29 ENCOUNTER — CARE COORDINATION (OUTPATIENT)
Dept: CARE COORDINATION | Age: 81
End: 2021-11-29

## 2021-11-29 NOTE — CARE COORDINATION
Attempting to reach patient for a follow up care coordination call regarding any needs, questions or concerns. Estuardo Vega, [address] [facility]  Mailbox is full, unable to leave a VM.

## 2021-11-30 NOTE — CARE COORDINATION
Patient returned call stating he has his good and bad days. He would like to try PT but stated he never knows when he will have a bad day. He was reminded of his upcoming appointment with PCP on 12.8.21 @ 12:30. He denied any needs from PCP or ACM today.

## 2021-12-07 ENCOUNTER — CARE COORDINATION (OUTPATIENT)
Dept: CARE COORDINATION | Age: 81
End: 2021-12-07

## 2021-12-07 NOTE — CARE COORDINATION
Ambulatory Care Coordination Note  12/7/2021  CM Risk Score: 13  Charlson 10 Year Mortality Risk Score: 100%     ACC: Anca Yousif RN    Summary Note: Spoke with patient who denied any needs from PCP or ACM at this time. Patient was someplace that is was loud. Conversation was limited. He was reminded of PCP follow up tomorrow. Care Coordination Interventions    Program Enrollment: Complex Care  Referral from Primary Care Provider: Yes  Suggested Interventions and Community Resources  Transportation Support: In Process  Zone Management Tools: In Process         Goals Addressed                 This Visit's Progress     Conditions and Symptoms   Improving     I will schedule office visits, as directed by my provider. I will keep my appointment or reschedule if I have to cancel. I will notify my provider of any barriers to my plan of care. I will follow my Zone Management tool to seek urgent or emergent care. I will notify my provider of any symptoms that indicate a worsening of my condition. Barriers: overwhelmed by complexity of regimen  Plan for overcoming my barriers: WORK WITH acm  Confidence: 7/10  Anticipated Goal Completion Date: 7.5.21                Prior to Admission medications    Medication Sig Start Date End Date Taking?  Authorizing Provider   carbidopa-levodopa (SINEMET)  MG per tablet TAKE 1 TABLET BY MOUTH 4 TIMES DAILY 12/3/21 3/3/22  JW Hillman CNP   clopidogrel (PLAVIX) 75 MG tablet TAKE 1 TABLET BY MOUTH ONCE DAILY  11/5/21   JW Hillman CNP   allopurinol (ZYLOPRIM) 100 MG tablet TAKE 1 TABLET BY MOUTH ONCE DAILY  11/5/21   JW Hillman CNP   rOPINIRole (REQUIP) 0.25 MG tablet TAKE ONE (1) TABLET BY MOUTH THREE (3) TIMES DAILY  10/27/21   JW Dhillon CNP   magnesium oxide (MAG-OX) 400 (241.3 Mg) MG TABS tablet Take 1 tablet by mouth daily 9/5/21   Abi Ramos MD   nitroGLYCERIN (NITROSTAT) 0.4 MG SL tablet Place 1 tablet under the tongue every 5 minutes as needed for Chest pain If no relief of chest pain after 3rd dose, go to the ER or call 911 6/14/21   JW Cohen CNP   atorvastatin (LIPITOR) 40 MG tablet take 1 tablet by mouth once daily 6/14/21   JW Cohen CNP   acetaminophen (TYLENOL 8 HOUR ARTHRITIS PAIN) 650 MG extended release tablet Take 1 tablet by mouth every 8 hours as needed for Pain 6/14/21   JW Cohen CNP   butalbital-acetaminophen-caffeine (FIORICET, ESGIC) -40 MG per tablet Take 1 tab prn only for severe headache. Can repeat after 4 hrs if needed. Max 2 tabs/24 hrs.  Max 4 tabs/week  Patient not taking: Reported on 10/4/2021 6/14/21   JW Cohen CNP   esomeprazole (NEXIUM) 20 MG delayed release capsule Take 1 capsule by mouth every morning (before breakfast) 6/14/21   JW Cohen CNP   magnesium oxide (MAG-OX) 400 MG tablet Take 1 tablet by mouth daily 6/14/21   JW Cohen CNP       Future Appointments   Date Time Provider Shireen Charles   12/8/2021 12:30 PM Anju Spaulding, [address]      and   General Assessment    Do you have any symptoms that are causing concern?: No

## 2021-12-10 ENCOUNTER — CARE COORDINATION (OUTPATIENT)
Dept: CARE COORDINATION | Age: 81
End: 2021-12-10

## 2021-12-10 NOTE — CARE COORDINATION
Patient called [facility] apologized for not calling [facility] back until now. He is asking about the eye Doctor he is supposed to see. [facility] provided information, patient wrote it down and will call to schedule. [facility] explained that there are several appointments that we need to schedule per PCP. He would like to do one at a time. [facility] reminded him that we can help arrange transportation. He stated \"let me call first\". Patient called back stating that his call was dropped while talking to the eye Doctor's office, when he called back the message said they are closed for the day. He will call Monday. [facility] provided him with Neurology contact information and he will attempt to call and scheule with them.

## 2021-12-13 ENCOUNTER — CARE COORDINATION (OUTPATIENT)
Dept: CARE COORDINATION | Age: 81
End: 2021-12-13

## 2021-12-13 NOTE — CARE COORDINATION
HC attempted to contact patient, there was no answer. HC left a message for patient and provided contact information  for a return call.     Plan of Care  [facility] OF [place], [facility]. will continue to try to reach patient

## 2021-12-20 ENCOUNTER — CARE COORDINATION (OUTPATIENT)
Dept: CARE COORDINATION | Age: 81
End: 2021-12-20

## 2021-12-20 NOTE — CARE COORDINATION
ACM spoke with patient who stated he has an appointment with someone an it is too early. He only had the phone number and address, unsure of which doctor he was seeing. ACM took the number provided by patient [phone] and called it was for an eye appointment.  was able to change it to   10 am on Dec 27th  From 8:30am.    ACM informed patient and will provide an appointment reminder. Patient denied any needs from PCP today.

## 2021-12-25 NOTE — TELEPHONE ENCOUNTER
Thank you Lisha Haddad for the follow through, I know this patient will appreciate the help. Please let me know if need anything else from us.
Thank you for reaching out. I'm in contact with our care coordinator who has been trying to figure out how to get Aishwarya a medical social worker. Alisa asked Aishwarya several times to provider information for family/friends and I have requested him to bring in a family member or friend to appointments and he has refused.
a message. Called his friend Lavonne Crump  [phone]  No answer, but was able to leave a message. Called patient back,  states feels better this afternoon. He states he is unable express himself fully due to his Parkinsons. He is able to tell me who the president is, the date, year, was able to tell me which virus is prominent in the world today. He was able to give me name and phone number and address. However, is unable to go over his medication list and how he should take his medications. Multiple attempts were made by me and have not received a call back. Will try them again prior to leaving for the day. The concern at this point is his medications and if he is taking them appropriately. This patient would benefit from a [facility] who could help him with medication management.   Will forward this note to Tylor Watson. CNP to see if she would be able to help this gentleman with a referral.
98.7

## 2021-12-27 ENCOUNTER — CARE COORDINATION (OUTPATIENT)
Dept: CARE COORDINATION | Age: 81
End: 2021-12-27

## 2021-12-27 NOTE — CARE COORDINATION
Patient called [facility] stating that he drove over to his eye appointment and staff told him that the Doctor did not make it in today. They will call him to reschedule.

## 2021-12-27 NOTE — CARE COORDINATION
Once again, [facility]. attempted to contact this patient to assist with his social  needs. The phone went immediately to his voicemail. HC left a message   for this patient, and provided contact information for a return call.     Plan of Care  [facility] will attempt to contact patient again

## 2021-12-27 NOTE — CARE COORDINATION
ACM spoke with patient and provided a reminder for eye appointment today. Patient stated that he is up and getting ready, but moving slowly. He stated that he has the address and phone number to where he is going. ACM will follow up in 5-7 days.

## 2022-01-03 ENCOUNTER — CARE COORDINATION (OUTPATIENT)
Dept: CARE COORDINATION | Age: 82
End: 2022-01-03

## 2022-01-03 NOTE — CARE COORDINATION
HC attempted to contact patient, there was no answer. HC left another message for patient, and provided the   contact information for a return call.     Plan of Care  [facility] OF [place], [facility]. will continue to attempt to reach out to patient

## 2022-01-06 ENCOUNTER — CARE COORDINATION (OUTPATIENT)
Dept: CARE COORDINATION | Age: 82
End: 2022-01-06

## 2022-01-06 NOTE — CARE COORDINATION
Ambulatory Care Coordination Note  1/6/2022  CM Risk Score: 13  Charlson 10 Year Mortality Risk Score: 100%     ACC: Morenita Laws RN    Summary Note: Spoke with patient who denied any needs from PCP or ACM at this time. Per patient he is feeling better today. He did not get rescheduled with eye doctor, he will call them. ACM reminded him that he needs to get scheduled with neurology as well. ACM will follow up in 1 week for any needs. Care Coordination Interventions    Program Enrollment: Complex Care  Referral from Primary Care Provider: Yes  Suggested Interventions and Community Resources  Transportation Support: In Process  Zone Management Tools: In Process         Goals Addressed                 This Visit's Progress     Conditions and Symptoms   On track     I will schedule office visits, as directed by my provider. I will keep my appointment or reschedule if I have to cancel. I will notify my provider of any barriers to my plan of care. I will follow my Zone Management tool to seek urgent or emergent care. I will notify my provider of any symptoms that indicate a worsening of my condition. Barriers: overwhelmed by complexity of regimen  Plan for overcoming my barriers: WORK WITH acm  Confidence: 7/10  Anticipated Goal Completion Date: 7.5.21                Prior to Admission medications    Medication Sig Start Date End Date Taking?  Authorizing Provider   carbidopa-levodopa (SINEMET)  MG per tablet TAKE 1 TABLET BY MOUTH 4 TIMES DAILY 12/3/21 3/3/22  JW Lara CNP   clopidogrel (PLAVIX) 75 MG tablet TAKE 1 TABLET BY MOUTH ONCE DAILY  11/5/21   JW Lara CNP   allopurinol (ZYLOPRIM) 100 MG tablet TAKE 1 TABLET BY MOUTH ONCE DAILY  11/5/21   JW Lara CNP   rOPINIRole (REQUIP) 0.25 MG tablet TAKE ONE (1) TABLET BY MOUTH THREE (3) TIMES DAILY  10/27/21   JW Hooks CNP   magnesium oxide (MAG-OX) 400 (241.3 Mg) MG TABS tablet Take 1 tablet by mouth daily 9/5/21   Davy Navarro MD   nitroGLYCERIN (NITROSTAT) 0.4 MG SL tablet Place 1 tablet under the tongue every 5 minutes as needed for Chest pain If no relief of chest pain after 3rd dose, go to the ER or call 911 6/14/21   Napolean Bamberger, APRN - CNP   atorvastatin (LIPITOR) 40 MG tablet take 1 tablet by mouth once daily 6/14/21   Napolean Bamberger, APRN - CNP   acetaminophen (TYLENOL 8 HOUR ARTHRITIS PAIN) 650 MG extended release tablet Take 1 tablet by mouth every 8 hours as needed for Pain 6/14/21   Napolean Bamberger, APRN - CNP   butalbital-acetaminophen-caffeine (FIORICET, ESGIC) -40 MG per tablet Take 1 tab prn only for severe headache. Can repeat after 4 hrs if needed. Max 2 tabs/24 hrs.  Max 4 tabs/week  Patient not taking: Reported on 10/4/2021 6/14/21   Napolean Bamberger, APRN - CNP   esomeprazole (NEXIUM) 20 MG delayed release capsule Take 1 capsule by mouth every morning (before breakfast) 6/14/21   Napolean Bamberger, APRN - CNP   magnesium oxide (MAG-OX) 400 MG tablet Take 1 tablet by mouth daily 6/14/21   Napolean Bamberger, APRN - CNP       Future Appointments   Date Time Provider Shireen Charles   1/19/2022 12:00 PM Napolean Bamberger, APRN - CNP [place] [facility]   1/19/2022 12:30 PM [address] [place] [address]      and   General Assessment    Do you have any symptoms that are causing concern?: No

## 2022-01-10 ENCOUNTER — CARE COORDINATION (OUTPATIENT)
Dept: CARE COORDINATION | Age: 82
End: 2022-01-10

## 2022-01-10 NOTE — CARE COORDINATION
HC attempted to reach out to patient for social needs, there was no answer. HC left a message for patient,  And provided contact information for a return call.     Plan of Care  [facility] OF [place], [facility]. will continue to attempt to reach patient until Estuardo Brooks says otherwise

## 2022-01-12 ENCOUNTER — CARE COORDINATION (OUTPATIENT)
Dept: CARE COORDINATION | Age: 82
End: 2022-01-12

## 2022-01-12 NOTE — CARE COORDINATION
Attempting to reach patient for a follow up care coordination call regarding any needs, questions or concerns.   Angelica April, [address]

## 2022-01-17 ENCOUNTER — CARE COORDINATION (OUTPATIENT)
Dept: CARE COORDINATION | Age: 82
End: 2022-01-17

## 2022-01-17 NOTE — CARE COORDINATION
HC attempted to contact the patient. His phone isn't receiving calls at this  time, mailbox is full. HC was unable   to leave a message.     Plan of Care  [facility] OF [place], [facility]. will continue to reach the patient

## 2022-01-18 ENCOUNTER — CARE COORDINATION (OUTPATIENT)
Dept: CARE COORDINATION | Age: 82
End: 2022-01-18

## 2022-01-19 ENCOUNTER — CARE COORDINATION (OUTPATIENT)
Dept: CARE COORDINATION | Age: 82
End: 2022-01-19

## 2022-01-19 PROBLEM — M46.96 UNSPECIFIED INFLAMMATORY SPONDYLOPATHY, LUMBAR REGION (HCC): Status: ACTIVE | Noted: 2022-01-19

## 2022-01-19 NOTE — CARE COORDINATION
Appointment reminder for PCP follow up today at 12:30. Patient stated that he did remember. AC will provide PCP with ZEB Leonard contact information and ask that she gives it to patient and explain that Johana Dudley is trying to help him find transportation to appointments. Request that he calls her back.

## 2022-01-21 ENCOUNTER — CARE COORDINATION (OUTPATIENT)
Dept: CARE COORDINATION | Age: 82
End: 2022-01-21

## 2022-01-21 NOTE — CARE COORDINATION
AMBER received VM from patient requesting help with his home health care referral. He said he called [facility] and was told that they do not have the referral.    AMBER asked PCP office Lynsey to fax referral, demographics and last office note to [address]: [phone]    AMBER attempted to reach out to patient no answer, no VM.

## 2022-01-21 NOTE — CARE COORDINATION
Patient attempted to reach the [facility] today. HC missed patients   call and attempted to call him back. Patients mailbox is full,  HC was unable to leave a message.     Plan of Care  [facility] will attempt to contact the patient next week

## 2022-01-25 ENCOUNTER — CARE COORDINATION (OUTPATIENT)
Dept: CARE COORDINATION | Age: 82
End: 2022-01-25

## 2022-01-25 NOTE — CARE COORDINATION
HC attempted to contact patient today, patients phone went immediately to the message stating that his voicemail was full.     Plan of Care  [facility] OF [place], [facility]. will attempt patient again next week

## 2022-01-27 ENCOUNTER — CARE COORDINATION (OUTPATIENT)
Dept: CARE COORDINATION | Age: 82
End: 2022-01-27

## 2022-01-27 SDOH — ECONOMIC STABILITY: HOUSING INSECURITY
IN THE LAST 12 MONTHS, WAS THERE A TIME WHEN YOU DID NOT HAVE A STEADY PLACE TO SLEEP OR SLEPT IN A SHELTER (INCLUDING NOW)?: NO

## 2022-01-27 SDOH — ECONOMIC STABILITY: INCOME INSECURITY: IN THE LAST 12 MONTHS, WAS THERE A TIME WHEN YOU WERE NOT ABLE TO PAY THE MORTGAGE OR RENT ON TIME?: NO

## 2022-01-27 SDOH — HEALTH STABILITY: PHYSICAL HEALTH: ON AVERAGE, HOW MANY DAYS PER WEEK DO YOU ENGAGE IN MODERATE TO STRENUOUS EXERCISE (LIKE A BRISK WALK)?: 0 DAYS

## 2022-01-27 SDOH — ECONOMIC STABILITY: HOUSING INSECURITY: IN THE LAST 12 MONTHS, HOW MANY PLACES HAVE YOU LIVED?: 1

## 2022-01-27 SDOH — HEALTH STABILITY: PHYSICAL HEALTH: ON AVERAGE, HOW MANY MINUTES DO YOU ENGAGE IN EXERCISE AT THIS LEVEL?: 0 MIN

## 2022-01-27 NOTE — CARE COORDINATION
Attempting to reach patient for a follow up care coordination call. Left detailed message for the patient to return my call with any questions or concerns. Would like to verify home health started.

## 2022-01-28 ENCOUNTER — CARE COORDINATION (OUTPATIENT)
Dept: CARE COORDINATION | Age: 82
End: 2022-01-28

## 2022-02-01 ENCOUNTER — CARE COORDINATION (OUTPATIENT)
Dept: CARE COORDINATION | Age: 82
End: 2022-02-01

## 2022-02-01 NOTE — CARE COORDINATION
HC attempted to contact patient, there was no answer. Patients mailbox was full, unable to leave a message.     Plan of Care  [facility] OF [place], [facility]. will attempt to contact patient again to assist with transportation and meals

## 2022-02-03 ENCOUNTER — CARE COORDINATION (OUTPATIENT)
Dept: CARE COORDINATION | Age: 82
End: 2022-02-03

## 2022-02-03 ASSESSMENT — LIFESTYLE VARIABLES: HOW OFTEN DO YOU HAVE A DRINK CONTAINING ALCOHOL: NEVER

## 2022-02-03 NOTE — CARE COORDINATION
Attempting to reach patient for a follow up care coordination call. Left detailed message for the patient to return my call with any questions or concerns. Would like to assist patient in scheduling needed appointments.    Arvell Harada RN, ambulatory care manager

## 2022-02-03 NOTE — CARE COORDINATION
Patient returned call to [facility]. Ambulatory Care Coordination Note  2/3/2022  CM Risk Score: 13  Charlson 10 Year Mortality Risk Score: 100%     ACC: Yanelis Vázquez RN    Summary Note: Patient agreed to having a phone call with AC and ZEB Lomax to start the process of getting transportation set up. [facility] will then call cardiology, neurology and eye doctor for appointments. This will be next week due to multiple office being closed due to weather. Emergency Preparedness: Patient/Caregiver Instructed in the following recommendations:     Have one gallon of water per person for at least 3 days on hand.  Have non-perishable (diet appropriate) food and manual can opener for at least 3 days that do not need to be cooked. Including pet food.  Have flashlights and batteries.  Charge your cell phones or rechargeable batteries for your cell phones.  Have 3+ days of back up oxygen in your home, if applicable.  Have a phone in your home that is hard wired & does not require power.  Have medication for a week in your home. Have medication in place for easy access & in zip lock bag for protection.  Have cooler with ice for medications that need to be refrigerated.  Make sure you have a caregiver in the home to provide care in case your home health nurse cannot get to your house.  Make sure you have all of your paperwork: ID; insurance cards; provider, pharmacy & medical equipment provider contact information; home health agency phone number, etc.   Call home service providers if you relocate so they can contact you. Care Coordination Interventions    Program Enrollment: Complex Care  Referral from Primary Care Provider: Yes  Suggested Interventions and [facility] [address]: In Process  Transportation Support: In Process  Zone Management Tools:  In Process         Goals Addressed    None         Prior to Admission medications    Medication Sig Start Date End Date Taking? Authorizing Provider   carbidopa-levodopa (SINEMET)  MG per tablet TAKE 1 TABLET BY MOUTH 4 TIMES DAILY 12/3/21 3/3/22  JW Pozo CNP   clopidogrel (PLAVIX) 75 MG tablet TAKE 1 TABLET BY MOUTH ONCE DAILY  11/5/21   JW Pozo CNP   allopurinol (ZYLOPRIM) 100 MG tablet TAKE 1 TABLET BY MOUTH ONCE DAILY  11/5/21   JW Pozo CNP   rOPINIRole (REQUIP) 0.25 MG tablet TAKE ONE (1) TABLET BY MOUTH THREE (3) TIMES DAILY  10/27/21   JW Mejia CNP   magnesium oxide (MAG-OX) 400 (241.3 Mg) MG TABS tablet Take 1 tablet by mouth daily 9/5/21   Shelia Taylor MD   nitroGLYCERIN (NITROSTAT) 0.4 MG SL tablet Place 1 tablet under the tongue every 5 minutes as needed for Chest pain If no relief of chest pain after 3rd dose, go to the ER or call 911 6/14/21   JW Mejia CNP   atorvastatin (LIPITOR) 40 MG tablet take 1 tablet by mouth once daily 6/14/21   JW Mejia CNP   acetaminophen (TYLENOL 8 HOUR ARTHRITIS PAIN) 650 MG extended release tablet Take 1 tablet by mouth every 8 hours as needed for Pain 6/14/21   JW Mejia CNP   butalbital-acetaminophen-caffeine (FIORICET, ESGIC) -40 MG per tablet Take 1 tab prn only for severe headache. Can repeat after 4 hrs if needed. Max 2 tabs/24 hrs.  Max 4 tabs/week  Patient not taking: Reported on 10/4/2021 6/14/21   JW Mejia CNP   esomeprazole (NEXIUM) 20 MG delayed release capsule Take 1 capsule by mouth every morning (before breakfast) 6/14/21   JW Mejia CNP   magnesium oxide (MAG-OX) 400 MG tablet Take 1 tablet by mouth daily 6/14/21   JW Mejia CNP       Future Appointments   Date Time Provider Shireen Charles   4/5/2022  3:30 PM Stephanie Espinosa MD Neuro [facility]   4/8/2022  3:15 PM Zachariah Ruffin MD [facility]al [facility]   4/20/2022 12:30 PM Jenifer Morris, [address] [place] [address]      and General Assessment

## 2022-02-08 ENCOUNTER — CARE COORDINATION (OUTPATIENT)
Dept: CARE COORDINATION | Age: 82
End: 2022-02-08

## 2022-02-08 NOTE — CARE COORDINATION
HC was connected with patient and the [facility]/Carlene Hidalgo Form. This has been  a long time coming to connect with this patient. [facility] is requesting assistance  with getting this patient connected to medical transportation and home delivered meals. HC was able to get information from the patient to assist with completing his [employer] application. HC then phoned and spoke  with a [employer] who reviewed patient information in her system,  and stated that the patient should be informed that he will receive a call either  this afternoon or tomorrow to set up his home delivered meals. HC attempted to contact the patient to share this information and patients mailbox was full. HC will attempt to reach out to patient again.      Plan of Care  [facility] OF [facility] will follow up with patient regarding meals  HC will follow up with [facility] transportation within a week

## 2022-02-08 NOTE — CARE COORDINATION
ACM spoke with patient who agreed to a 3 way call with ZEB Lomax to help apply for transportation and meals on wheels. Call was successful and Dian Jacques will work on these things for patient. Patient denied any needs from [facility] or PCP at this time. Will follow up in 1 week.

## 2022-02-14 ENCOUNTER — CARE COORDINATION (OUTPATIENT)
Dept: CARE COORDINATION | Age: 82
End: 2022-02-14

## 2022-02-14 NOTE — CARE COORDINATION
HC attempted to contact the patient, his mailbox was full, and HC wasn't  able to leave a message for patient.      Plan of Care   [facility] OF [place], [facility]. will follow up with [employer] on 02/16/22 for patient's approval

## 2022-02-15 ENCOUNTER — CARE COORDINATION (OUTPATIENT)
Dept: CARE COORDINATION | Age: 82
End: 2022-02-15

## 2022-02-15 NOTE — CARE COORDINATION
AMBER received call from patient asking how he gets his lab orders. ARMANIM explained that if he goes to a [facility] that they can pull the orders from [address] [place] [address] Rd. Patient stated he is going to go to [facility] lab tomorrow, pending weather.

## 2022-02-16 ENCOUNTER — CARE COORDINATION (OUTPATIENT)
Dept: CARE COORDINATION | Age: 82
End: 2022-02-16

## 2022-02-16 ENCOUNTER — HOSPITAL ENCOUNTER (OUTPATIENT)
Age: 82
Discharge: HOME | End: 2022-02-16
Payer: MEDICARE

## 2022-02-16 DIAGNOSIS — N18.31 STAGE 3A CHRONIC KIDNEY DISEASE (HCC): ICD-10-CM

## 2022-02-16 DIAGNOSIS — N28.9 IMPAIRED RENAL FUNCTION: ICD-10-CM

## 2022-02-16 DIAGNOSIS — E78.00 PURE HYPERCHOLESTEROLEMIA: ICD-10-CM

## 2022-02-16 LAB
-: NORMAL
ABSOLUTE EOS #: 0.18 K/UL (ref 0–0.44)
ABSOLUTE IMMATURE GRANULOCYTE: <0.03 K/UL (ref 0–0.3)
ABSOLUTE LYMPH #: 1.85 K/UL (ref 1.1–3.7)
ABSOLUTE MONO #: 0.55 K/UL (ref 0.1–1.2)
ALBUMIN SERPL-MCNC: 4.5 G/DL (ref 3.5–5.2)
ALBUMIN/GLOBULIN RATIO: 1.3 (ref 1–2.5)
ALP BLD-CCNC: 121 U/L (ref 40–129)
ALT SERPL-CCNC: 22 U/L (ref 5–41)
AMORPHOUS: NORMAL
ANION GAP SERPL CALCULATED.3IONS-SCNC: 13 MMOL/L (ref 9–17)
ANION GAP SERPL CALCULATED.3IONS-SCNC: 13 MMOL/L (ref 9–17)
AST SERPL-CCNC: 29 U/L
BACTERIA: NORMAL
BASOPHILS # BLD: 0 % (ref 0–2)
BASOPHILS ABSOLUTE: 0.03 K/UL (ref 0–0.2)
BILIRUB SERPL-MCNC: 0.57 MG/DL (ref 0.3–1.2)
BILIRUBIN URINE: NEGATIVE
BUN BLDV-MCNC: 19 MG/DL (ref 8–23)
BUN BLDV-MCNC: 19 MG/DL (ref 8–23)
BUN/CREAT BLD: ABNORMAL (ref 9–20)
BUN/CREAT BLD: ABNORMAL (ref 9–20)
CALCIUM SERPL-MCNC: 10 MG/DL (ref 8.6–10.4)
CALCIUM SERPL-MCNC: 10 MG/DL (ref 8.6–10.4)
CASTS UA: NORMAL /LPF (ref 0–8)
CHLORIDE BLD-SCNC: 96 MMOL/L (ref 98–107)
CHLORIDE BLD-SCNC: 96 MMOL/L (ref 98–107)
CHOLESTEROL, FASTING: 92 MG/DL
CHOLESTEROL/HDL RATIO: 1.9
CO2: 23 MMOL/L (ref 20–31)
CO2: 23 MMOL/L (ref 20–31)
COLOR: YELLOW
CREAT SERPL-MCNC: 1.34 MG/DL (ref 0.7–1.2)
CREAT SERPL-MCNC: 1.34 MG/DL (ref 0.7–1.2)
CRYSTALS, UA: NORMAL /HPF
DIFFERENTIAL TYPE: ABNORMAL
EOSINOPHILS RELATIVE PERCENT: 2 % (ref 1–4)
EPITHELIAL CELLS UA: NORMAL /HPF (ref 0–5)
GFR AFRICAN AMERICAN: >60 ML/MIN
GFR AFRICAN AMERICAN: >60 ML/MIN
GFR NON-AFRICAN AMERICAN: 51 ML/MIN
GFR NON-AFRICAN AMERICAN: 51 ML/MIN
GFR SERPL CREATININE-BSD FRML MDRD: ABNORMAL ML/MIN/{1.73_M2}
GLUCOSE BLD-MCNC: 102 MG/DL (ref 70–99)
GLUCOSE BLD-MCNC: 102 MG/DL (ref 70–99)
GLUCOSE URINE: NEGATIVE
HCT VFR BLD CALC: 47.3 % (ref 40.7–50.3)
HCT VFR BLD CALC: 47.3 % (ref 40.7–50.3)
HDLC SERPL-MCNC: 48 MG/DL
HEMOGLOBIN: 15.3 G/DL (ref 13–17)
HEMOGLOBIN: 15.3 G/DL (ref 13–17)
IMMATURE GRANULOCYTES: 0 %
KETONES, URINE: NEGATIVE
LDL CHOLESTEROL: 31 MG/DL (ref 0–130)
LEUKOCYTE ESTERASE, URINE: NEGATIVE
LYMPHOCYTES # BLD: 25 % (ref 24–43)
MAGNESIUM: 2.3 MG/DL (ref 1.6–2.6)
MCH RBC QN AUTO: 31.2 PG (ref 25.2–33.5)
MCH RBC QN AUTO: 31.2 PG (ref 25.2–33.5)
MCHC RBC AUTO-ENTMCNC: 32.3 G/DL (ref 28.4–34.8)
MCHC RBC AUTO-ENTMCNC: 32.3 G/DL (ref 28.4–34.8)
MCV RBC AUTO: 96.3 FL (ref 82.6–102.9)
MCV RBC AUTO: 96.3 FL (ref 82.6–102.9)
MONOCYTES # BLD: 7 % (ref 3–12)
MUCUS: NORMAL
NITRITE, URINE: NEGATIVE
NRBC AUTOMATED: 0 PER 100 WBC
NRBC AUTOMATED: 0 PER 100 WBC
OTHER OBSERVATIONS UA: NORMAL
PDW BLD-RTO: 14 % (ref 11.8–14.4)
PDW BLD-RTO: 14 % (ref 11.8–14.4)
PH UA: 6 (ref 5–8)
PHOSPHORUS: 3.4 MG/DL (ref 2.5–4.5)
PLATELET # BLD: 241 K/UL (ref 138–453)
PLATELET # BLD: 241 K/UL (ref 138–453)
PLATELET ESTIMATE: ABNORMAL
PMV BLD AUTO: 9.3 FL (ref 8.1–13.5)
PMV BLD AUTO: 9.3 FL (ref 8.1–13.5)
POTASSIUM SERPL-SCNC: 4.6 MMOL/L (ref 3.7–5.3)
POTASSIUM SERPL-SCNC: 4.6 MMOL/L (ref 3.7–5.3)
PROTEIN UA: NEGATIVE
RBC # BLD: 4.91 M/UL (ref 4.21–5.77)
RBC # BLD: 4.91 M/UL (ref 4.21–5.77)
RBC # BLD: ABNORMAL 10*6/UL
RBC UA: NORMAL /HPF (ref 0–4)
RENAL EPITHELIAL, UA: NORMAL /HPF
SEG NEUTROPHILS: 66 % (ref 36–65)
SEGMENTED NEUTROPHILS ABSOLUTE COUNT: 4.77 K/UL (ref 1.5–8.1)
SODIUM BLD-SCNC: 132 MMOL/L (ref 135–144)
SODIUM BLD-SCNC: 132 MMOL/L (ref 135–144)
SPECIFIC GRAVITY UA: 1.02 (ref 1–1.03)
TOTAL PROTEIN: 7.9 G/DL (ref 6.4–8.3)
TRICHOMONAS: NORMAL
TRIGLYCERIDE, FASTING: 66 MG/DL
TURBIDITY: CLEAR
URINE HGB: NEGATIVE
UROBILINOGEN, URINE: NORMAL
VLDLC SERPL CALC-MCNC: NORMAL MG/DL (ref 1–30)
WBC # BLD: 7.4 K/UL (ref 3.5–11.3)
WBC # BLD: 7.4 K/UL (ref 3.5–11.3)
WBC # BLD: ABNORMAL 10*3/UL
WBC UA: NORMAL /HPF (ref 0–5)
YEAST: NORMAL

## 2022-02-16 PROCEDURE — 80048 BASIC METABOLIC PNL TOTAL CA: CPT

## 2022-02-16 PROCEDURE — 80053 COMPREHEN METABOLIC PANEL: CPT

## 2022-02-16 PROCEDURE — 36415 COLL VENOUS BLD VENIPUNCTURE: CPT

## 2022-02-16 PROCEDURE — 84100 ASSAY OF PHOSPHORUS: CPT

## 2022-02-16 PROCEDURE — 85025 COMPLETE CBC W/AUTO DIFF WBC: CPT

## 2022-02-16 PROCEDURE — 83735 ASSAY OF MAGNESIUM: CPT

## 2022-02-16 PROCEDURE — 81001 URINALYSIS AUTO W/SCOPE: CPT

## 2022-02-16 PROCEDURE — 80061 LIPID PANEL: CPT

## 2022-02-16 PROCEDURE — 85027 COMPLETE CBC AUTOMATED: CPT

## 2022-02-16 NOTE — CARE COORDINATION
Patient called ACM stating that he \"finally made it to get his labs done\". He would like PCP to look at them and let him know if she has any recommendations. ACM will route to PCP and then follow up with patient.

## 2022-02-16 NOTE — TELEPHONE ENCOUNTER
Labs are stable. His kidney function is still high but it hasnt changed, sodium was still a little low but unchanged.  Overall good, continue with nephrology follow up

## 2022-02-16 NOTE — CARE COORDINATION
HC attempt to contact patient to follow up on his food services from [employer]. There was no answer, and the mailbox was full. HC contacted [employer] for patients approval.  Patient has been approved for the [facility].  Patient will be updated with this information when patient can be reached.       Plan of Care  [facility], [facility]. will continue to try to reach patient to share updates

## 2022-02-17 ENCOUNTER — CARE COORDINATION (OUTPATIENT)
Dept: CARE COORDINATION | Age: 82
End: 2022-02-17

## 2022-02-17 NOTE — CARE COORDINATION
Patient returned ACM call and was informed of PCP recommendations regarding labs as follows:  Que Garrido, JW - CNP at 2/16/2022  4:27 PM    Status: Signed   Labs are stable. His kidney function is still high but it hasnt changed, sodium was still a little low but unchanged. Overall good, continue with nephrology follow up        Patient was confused about who his nephrologist is. [facility] offered to call and schedule appointment for him. [facility] spoke with Nephrology who verified that he is scheduled for 4.8.22 @ 3:15 pm with Dr. John Winston [address] [facility]. [facility] attempted to inform patient, no answer, VM full.

## 2022-02-21 ENCOUNTER — CARE COORDINATION (OUTPATIENT)
Dept: CARE COORDINATION | Age: 82
End: 2022-02-21

## 2022-02-21 NOTE — CARE COORDINATION
ACM left  requesting a call back to discuss appointment that was scheduled at his request.  ACM spoke with Nephrology who verified that he is scheduled for 4.8.22 @ 3:15 pm with Dr. Jaspreet Seay [address] [facility]. Patient called back and was informed of the appointment all details provided.

## 2022-02-23 ENCOUNTER — CARE COORDINATION (OUTPATIENT)
Dept: CARE COORDINATION | Age: 82
End: 2022-02-23

## 2022-02-23 NOTE — CARE COORDINATION
HC attempted to contact the patient, there was no answer. HC was unable to leave a message due to patient's mailbox was full.     Plan of Care  [facility] OF [facility]. will attempt to reach out to patient again

## 2022-02-28 ENCOUNTER — CARE COORDINATION (OUTPATIENT)
Dept: CARE COORDINATION | Age: 82
End: 2022-02-28

## 2022-02-28 ASSESSMENT — SOCIAL DETERMINANTS OF HEALTH (SDOH)
HOW OFTEN DO YOU GET TOGETHER WITH FRIENDS OR RELATIVES?: THREE TIMES A WEEK
HOW OFTEN DO YOU GET TOGETHER WITH FRIENDS OR RELATIVES?: THREE TIMES A WEEK
DO YOU BELONG TO ANY CLUBS OR ORGANIZATIONS SUCH AS CHURCH GROUPS UNIONS, FRATERNAL OR ATHLETIC GROUPS, OR SCHOOL GROUPS?: NO
IN A TYPICAL WEEK, HOW MANY TIMES DO YOU TALK ON THE PHONE WITH FAMILY, FRIENDS, OR NEIGHBORS?: THREE TIMES A WEEK
HOW OFTEN DO YOU ATTEND CHURCH OR RELIGIOUS SERVICES?: NEVER
IN A TYPICAL WEEK, HOW MANY TIMES DO YOU TALK ON THE PHONE WITH FAMILY, FRIENDS, OR NEIGHBORS?: THREE TIMES A WEEK
DO YOU BELONG TO ANY CLUBS OR ORGANIZATIONS SUCH AS CHURCH GROUPS UNIONS, FRATERNAL OR ATHLETIC GROUPS, OR SCHOOL GROUPS?: NO
HOW OFTEN DO YOU ATTEND CHURCH OR RELIGIOUS SERVICES?: NEVER
HOW OFTEN DO YOU ATTENT MEETINGS OF THE CLUB OR ORGANIZATION YOU BELONG TO?: NEVER
HOW OFTEN DO YOU ATTENT MEETINGS OF THE CLUB OR ORGANIZATION YOU BELONG TO?: NEVER

## 2022-02-28 NOTE — CARE COORDINATION
[facility]  Confidence: 7/10  Anticipated Goal Completion Date: 7.5.21                Prior to Admission medications    Medication Sig Start Date End Date Taking? Authorizing Provider   esomeprazole (NEXIUM) 20 MG delayed release capsule TAKE 1 CAPSULE BY MOUTH ONCE DAILY IN THE MORNING BEFORE BREAKFAST 2/23/22   JW Larsen CNP   rOPINIRole (REQUIP) 0.25 MG tablet TAKE ONE (1) TABLET BY MOUTH THREE (3) TIMES DAILY 2/23/22   JW Larsen CNP   carbidopa-levodopa (SINEMET)  MG per tablet TAKE 1 TABLET BY MOUTH 4 TIMES DAILY 12/3/21 3/3/22  JW Bain CNP   clopidogrel (PLAVIX) 75 MG tablet TAKE 1 TABLET BY MOUTH ONCE DAILY  11/5/21   JW Bain CNP   allopurinol (ZYLOPRIM) 100 MG tablet TAKE 1 TABLET BY MOUTH ONCE DAILY  11/5/21   JW Bain CNP   magnesium oxide (MAG-OX) 400 (241.3 Mg) MG TABS tablet Take 1 tablet by mouth daily 9/5/21   Nell Cadena MD   nitroGLYCERIN (NITROSTAT) 0.4 MG SL tablet Place 1 tablet under the tongue every 5 minutes as needed for Chest pain If no relief of chest pain after 3rd dose, go to the ER or call 911 6/14/21   JW Larsen CNP   atorvastatin (LIPITOR) 40 MG tablet take 1 tablet by mouth once daily 6/14/21   JW Larsen CNP   acetaminophen (TYLENOL 8 HOUR ARTHRITIS PAIN) 650 MG extended release tablet Take 1 tablet by mouth every 8 hours as needed for Pain 6/14/21   JW Larsen CNP   butalbital-acetaminophen-caffeine (FIORICET, ESGIC) -40 MG per tablet Take 1 tab prn only for severe headache. Can repeat after 4 hrs if needed. Max 2 tabs/24 hrs.  Max 4 tabs/week  Patient not taking: Reported on 10/4/2021 6/14/21   JW Larsen CNP   magnesium oxide (MAG-OX) 400 MG tablet Take 1 tablet by mouth daily 6/14/21   JW Larsen CNP       Future Appointments   Date Time Provider Shireen Charles   4/5/2022  3:30 PM Tete Balderas MD Neuro [facility]   4/8/2022  3:15 PM Marcelina Mccoy MD AFL RenalSrv AFL Renal Se   4/20/2022 12:30 PM Sol Mew, APRN - CNP [facility]  [facility]      and   General Assessment    Do you have any symptoms that are causing concern?: Yes  Progression since Onset: Intermittent - Waxing/Waning  Reported Symptoms: Other (Comment: head ache )

## 2022-03-02 ENCOUNTER — CARE COORDINATION (OUTPATIENT)
Dept: CARE COORDINATION | Age: 82
End: 2022-03-02

## 2022-03-02 NOTE — CARE COORDINATION
HC was successful at reaching the patient today. Patient reported   that he has a new phone and thinks that he should be able to be   reached more often. Patient also stated that he is feeling better   these recent days, with the exception of his knee. HC shared with  the patient that she was so very happy to be able to reach him. [facility] shared with the patient that he has been approved for the [employer]. HC provided patient with the process for  Scheduling a cab, and shared that he has to inform the people that  He is scheduling for the senior cab program, and that he has to schedule 48 hours prior to his appointment. [facility] provided patient with the phone number for [employer], and also shared with the patient that the [facility]. can/will assist him with arranging for his cab service. HC also inquired if the patient has received smoke detectors for his home. Patient stated that he doesn't know where they are. HC was unclear of whether the patient had them and they've been misplaced or what.  will  research where patient can get free smoke detectors for his home, and will get back with him.     Plan of Care  [facility] will research smoke detectors for patient  [facility] will ask ACM to contact patient regarding knee pain

## 2022-03-10 ENCOUNTER — CARE COORDINATION (OUTPATIENT)
Dept: CARE COORDINATION | Age: 82
End: 2022-03-10

## 2022-03-14 ENCOUNTER — CARE COORDINATION (OUTPATIENT)
Dept: CARE COORDINATION | Age: 82
End: 2022-03-14

## 2022-03-15 ENCOUNTER — CARE COORDINATION (OUTPATIENT)
Dept: CARE COORDINATION | Age: 82
End: 2022-03-15

## 2022-03-15 NOTE — CARE COORDINATION
HC has left a message for the [employer], regarding free smoke alarms for patient. HC will await a return call. HC  received a request from LISETTE Sandoval/AMBER for transportation to be arranged for patient's upcoming appointments. [facility]   contacted [employer] [facility], and arranged transportation for Tuesday, 04/05/22, 3:00p appointment with Floridalma Medina,  Neurologist, [place], [id number]. Patient will be picked up @ 2p, in case [employer] has a busy day, to ensure  the patient arrives at his appointment on time. Patient was scheduled with [employer] for Friday, 04/08/22, 3:15p appointment with Dr. Ousmane Su, Renal ,  @ [address] [place] [address], [address]. HC phoned and shared this information with patient. Patient   stated that he was writing this information down. Plan of Care  [facility] will follow up with patient with smoke alarm info, and will follow up with transportation information.

## 2022-03-15 NOTE — CARE COORDINATION
Ambulatory Care Coordination Note  3/15/2022  CM Risk Score: 13  Charlson 10 Year Mortality Risk Score: 100%     ACC: Burton Shah, RN    Summary Note: Spoke with patient who stated that he feels weaker in the morning than he does later in the day. He stated he is eating and drinking plenty. He would like help scheduling rides for his cardiology, nephrology and neurology appointments. [facility] called [facility] cardiology and confirmed that he did not have an appointment scheduled. [facility] scheduled appointment for     4/25/22 at 1:45 pm.   [address] [place], [address] [facility]  [phone]    Other appointments are in Epic schedule already    [facility] will ask ZEB Lomax to help schedule these rides. [facility] will follow up with patient regarding details of his rides after they are scheduled. Care Coordination Interventions    Program Enrollment: Complex Care  Referral from Primary Care Provider: Yes  Suggested Interventions and [address]: In Process  Transportation Support: In Process  Zone Management Tools: In Process         Goals Addressed                 This Visit's Progress     Conditions and Symptoms   Improving     I will schedule office visits, as directed by my provider. I will keep my appointment or reschedule if I have to cancel. I will notify my provider of any barriers to my plan of care. I will follow my Zone Management tool to seek urgent or emergent care. I will notify my provider of any symptoms that indicate a worsening of my condition. Barriers: overwhelmed by complexity of regimen  Plan for overcoming my barriers: WORK WITH [facility]  Confidence: 7/10  Anticipated Goal Completion Date: 7.5.21                Prior to Admission medications    Medication Sig Start Date End Date Taking?  Authorizing Provider   esomeprazole ([employer]) 20 MG delayed release capsule TAKE 1 CAPSULE BY MOUTH ONCE DAILY IN THE MORNING BEFORE BREAKFAST 2/23/22   JW Woods - CNP   rOPINIRole (REQUIP) 0.25 MG tablet TAKE ONE (1) TABLET BY MOUTH THREE (3) TIMES DAILY 2/23/22   JW Kendrick CNP   carbidopa-levodopa (SINEMET)  MG per tablet TAKE 1 TABLET BY MOUTH 4 TIMES DAILY 12/3/21 3/3/22  JW Resendiz CNP   clopidogrel (PLAVIX) 75 MG tablet TAKE 1 TABLET BY MOUTH ONCE DAILY  11/5/21   JW Resendiz CNP   allopurinol (ZYLOPRIM) 100 MG tablet TAKE 1 TABLET BY MOUTH ONCE DAILY  11/5/21   JW Resendiz CNP   magnesium oxide (MAG-OX) 400 (241.3 Mg) MG TABS tablet Take 1 tablet by mouth daily 9/5/21   Walt Barragan MD   nitroGLYCERIN (NITROSTAT) 0.4 MG SL tablet Place 1 tablet under the tongue every 5 minutes as needed for Chest pain If no relief of chest pain after 3rd dose, go to the ER or call 911 6/14/21   JW Kendrick CNP   atorvastatin (LIPITOR) 40 MG tablet take 1 tablet by mouth once daily 6/14/21   JW Kendrick CNP   acetaminophen (TYLENOL 8 HOUR ARTHRITIS PAIN) 650 MG extended release tablet Take 1 tablet by mouth every 8 hours as needed for Pain 6/14/21   JW Kendrick CNP   butalbital-acetaminophen-caffeine (FIORICET, ESGIC) -40 MG per tablet Take 1 tab prn only for severe headache. Can repeat after 4 hrs if needed. Max 2 tabs/24 hrs.  Max 4 tabs/week  Patient not taking: Reported on 10/4/2021 6/14/21   JW Kendrick CNP   magnesium oxide (MAG-OX) 400 MG tablet Take 1 tablet by mouth daily 6/14/21   JW Kendrick CNP       Future Appointments   Date Time Provider Shireen Charles   4/5/2022  3:30 PM [facility]   4/8/2022  3:15 PM Sharyle Sheen, MD [facility] Renal[facility]rv [facility] Renal Se   4/20/2022 12:30 PM JW Kendrick CNP [facility]      and   General Assessment    Do you have any symptoms that are causing concern?: Yes  Progression since Onset: Intermittent - Waxing/Waning  Reported Symptoms: Weakness

## 2022-03-17 ENCOUNTER — CARE COORDINATION (OUTPATIENT)
Dept: CARE COORDINATION | Age: 82
End: 2022-03-17

## 2022-03-17 NOTE — CARE COORDINATION
Patient called [facility] asking if ZEB Lomax could call him. He reports a change in one of his appointments that she scheduled a ride for him, the office called him to change the time. [facility] will route to Delfin JONES. Patient also reported that he is having some sort of burning on his scalp, his bones are hurting  and some ear pain. [facility] scheduled him for a next day PCP appointment. [facility] will follow up in 3-5 days.

## 2022-03-17 NOTE — CARE COORDINATION
HC received a message from [facility], who stated that she received a message from patient, who stated that he needs to alert the [facility] of an change in his appointment time for one of the transportation arrangements that the [facility] had assisted with. HC attempted to contact the patient, there was  no answer, HC left a message, and provided contact information for a return call.     Plan of Care  [facility] will attempt to reach the patient again on 03/18/22

## 2022-03-18 ENCOUNTER — CARE COORDINATION (OUTPATIENT)
Dept: CARE COORDINATION | Age: 82
End: 2022-03-18

## 2022-03-18 NOTE — CARE COORDINATION
HC attempted to contact patient, there was no answer. HC left a message  and provided contact information for a return call. HC attempted a second  time to contact the patient and his mailbox was full. Plan of Care  [facility] [place], [facility]. will attempt to contact patient again on 03/21/22.

## 2022-03-21 ENCOUNTER — CARE COORDINATION (OUTPATIENT)
Dept: CARE COORDINATION | Age: 82
End: 2022-03-21

## 2022-03-21 NOTE — CARE COORDINATION
successfully reached patient today, and called the provider offices to clarify changes  in appointment times.  was informed that the appointment with Dr. Shoaib Raymundo on  Friday, 04/08/22, has been changed from 3:45p, to 12:45p.   contacted [employer] and made changes to patients transportation.  always schedules patients thirty  minutes earlier than the hour pickup, to ensure the patients timely arrival.  Patient is   scheduled to be picked up at 11:15a. This information was shared with the patient, and  will be reviewed as needed.     Plan of Care  [facility] [place], [facility]. will follow up with patient prior to his appointments for 04/05/22 and 04/08/22  to review transportation arrangements

## 2022-03-22 ENCOUNTER — CARE COORDINATION (OUTPATIENT)
Dept: CARE COORDINATION | Age: 82
End: 2022-03-22

## 2022-03-22 NOTE — CARE COORDINATION
ACM received call from patient asking for assistance with scheduling with pain management. AC called referral in chart :  [address], [place]   [address] [facility], [facility] [address]   Phone: [phone]   Fax: [phone]    Patient was scheduled for 5-9-2022 at 1:15 pm    Patient notified and asked if he would be seen sooner. ACM explained that we could not schedule him in April due to him already using all of his rides for that month. Patient inquired about [facility] since he can drive himself there. ACM left  with [facility] pain management requesting a return call to [facility]. AC spoke with [facility] pain management who stated he will need a new referral but they could see him the middle of April. [facility] will place new referral and call to schedule appointment. Patient notified and encouraged to schedule if they call and [facility] will let him know if they call [facility] to schedule.

## 2022-03-28 ENCOUNTER — CARE COORDINATION (OUTPATIENT)
Dept: CARE COORDINATION | Age: 82
End: 2022-03-28

## 2022-03-28 NOTE — CARE COORDINATION
ACM spoke to pain management and they are aware of the need for an appointment at [facility]. They will call either patient or ACM to schedule.

## 2022-03-31 ENCOUNTER — CARE COORDINATION (OUTPATIENT)
Dept: CARE COORDINATION | Age: 82
End: 2022-03-31

## 2022-03-31 NOTE — CARE COORDINATION
Patient called ACM stating that he has a headache today. ACM asked if he is taking anything for the head ache, he said he will try some tylenol. ACM recommended trying that and resting. Patient encouraged to call if no improvement. Patient reminded of neurology appointment next week.

## 2022-04-04 ENCOUNTER — CARE COORDINATION (OUTPATIENT)
Dept: CARE COORDINATION | Age: 82
End: 2022-04-04

## 2022-04-04 NOTE — CARE COORDINATION
[facility]. phoned this patient and reminded him of his upcoming appointment on Tuesday, 04/05/22, at   [address]. V's with Dr. Dr. Jessica Golden @ 3p. Patient is scheduled  to be picked up @ 2p. Patient stated an understanding. He also stated that he's preparing for a 10:15a appointment today. Plan of Care  [facility] will follow up with patient future transportation assistance.

## 2022-04-04 NOTE — CARE COORDINATION
ACM left  for patient requesting a return call. Reminder for tomorrows appointment and his transportation  time.

## 2022-04-05 ENCOUNTER — OFFICE VISIT (OUTPATIENT)
Dept: NEUROLOGY | Age: 82
End: 2022-04-05
Payer: MEDICARE

## 2022-04-05 ENCOUNTER — CARE COORDINATION (OUTPATIENT)
Dept: CARE COORDINATION | Age: 82
End: 2022-04-05

## 2022-04-05 VITALS
BODY MASS INDEX: 27.62 KG/M2 | OXYGEN SATURATION: 100 % | HEART RATE: 74 BPM | SYSTOLIC BLOOD PRESSURE: 121 MMHG | HEIGHT: 67 IN | WEIGHT: 176 LBS | DIASTOLIC BLOOD PRESSURE: 80 MMHG

## 2022-04-05 DIAGNOSIS — G20 PARKINSON DISEASE (HCC): Primary | ICD-10-CM

## 2022-04-05 PROCEDURE — 99214 OFFICE O/P EST MOD 30 MIN: CPT | Performed by: STUDENT IN AN ORGANIZED HEALTH CARE EDUCATION/TRAINING PROGRAM

## 2022-04-05 RX ORDER — AMITRIPTYLINE HYDROCHLORIDE 10 MG/1
10 TABLET, FILM COATED ORAL NIGHTLY
Qty: 90 TABLET | Refills: 3 | Status: SHIPPED | OUTPATIENT
Start: 2022-04-05

## 2022-04-05 RX ORDER — ENTACAPONE 200 MG/1
200 TABLET ORAL 4 TIMES DAILY
Qty: 90 TABLET | Refills: 3 | Status: SHIPPED | OUTPATIENT
Start: 2022-04-05 | End: 2022-05-24

## 2022-04-05 NOTE — CARE COORDINATION
Omid Sandoval/[facility] stated that the patient had called her concerned about his cab. HC informed AC that she contact [employer] and patient. B/W messaged the [facility] instead of the patient that they were  a few minutes away. HC contacted the patient to inform  him that the cab was enroute. Patient thanked the [facility]. Plan of Care  [facility] will follow up with the patient regarding his transportation.

## 2022-04-05 NOTE — PROGRESS NOTES
[address] [place] [address],  [address], [facility] [place] [facility], [address] [facility], [address] [place] [address]  P: [phone]  F: [phone]    NEUROLOGY CLINIC NOTE     PATIENT NAME: Edith Marshall  PATIENT MRN: [id number]  PRIMARY CARE PHYSICIAN: JW Joe - CNP    HPI:      Edith Marshall is a 79 y.o. right handed  male with PMH significant for head trauma in March 2011 after he fell off from the ladder with a skull fracture along with subdural hematoma that was treated conservatively. As per patient and previous notes he started having headache that got worse for last couple of months. He also endorses headaches are located the top of the head but is unable to explain the duration 4 times in a week and said that once it starts happening it keeps going. He tried taking Tylenol that has partially relieved and previously he has been taking Fioricet and gabapentin. He describes headache as throbbing/warm sensation with photophobia and phonophobia but denies any blurring of vision or double spots. He also mentions that he has been taking Sinemet for his Parkinson's and used to follow-up with Dr. Mercy Jeffrey but now it is not seen him. On examination, patient was wheelchair-bound. Alert oriented x3. No cranial nerve deficits. Flat face with mild tongue tremor. No hearing difficulty. Resting tremor. Increased rigidity in bilateral upper extremities and increases with contralateral movement. Slow gait with increased number of steps turning and pullback test positive. He mentioned that he wanted to cancel his appointment due to feeling weak but eventually came. He also mentioned difficulty sleeping.     History of:  Family history of headaches or migraines: no  Renal stones: no   Motion sickness:  no  Head/Neck Trauma: yes -as above  Stressors: no  Sleep: 6  Hours,            Difficulty in initiating or maintaining sleep: no,              Snoring: no  Psych/Mood: Appropriate  Caffeine: 1-2cup coffee per day     Headache Medications  Current abortive meds: Tylenol  Current prophylactic meds:none  Previous abortive medications tried: Unknown  Previous prophylactic medications tried: gabapentin        Patient follows up with a neurologist Dr. Cari Rausch for the Parkinson's disease, currently taking Sinemet for that. Discussed with patient starting entacapone 200 mg 4 times a day with Sinemet and amitriptyline 10 mg nightly. Request was made for dr Keyon Gardner office for patient's previous records. He also has been referred to physical therapy. Plan to follow-up in 6 weeks.        PATIENT HISTORY:     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 ([facility] [address] 3/18/2011    d/t being hurt on the job    CKD (chronic kidney disease) stage 2, GFR 60-89 ml/min     CKD (chronic kidney disease) stage 3, GFR 30-59 ml/min ([facility]     Diverticulosis of colon     GERD (gastroesophageal reflux disease)     Impaired renal function     MRSA (methicillin resistant staph aureus) culture positive 9/23/2015    leg    Osteoarthritis of knee     Left    Parkinson disease ([facility] [address]     Proteinuria     Skull fracture ([facility] [address] 3/18/2011    d/t 12-foot drop on the job    Temporary loss of eyesight     periodically, happened x7    Tubular adenoma of colon 2012, 2017    [facility]        Past Surgical History:   Procedure Laterality Date    COLONOSCOPY  11/02/2012    poor prep, tubular adenoma    COLONOSCOPY  09/19/2017    diverticulosis, multiple polyps as described, spot marking done, pathology-tubular adenoma x 4    HI COLSC FLX W/RMVL OF TUMOR POLYP LESION SNARE TQ N/A 9/19/2017    COLONOSCOPY POLYPECTOMY SNARE/COLD BIOPSY with tatooing and apc transverse colon performed by Curry Mills MD at [facility]     rotator cuff        Social History     Socioeconomic History    Marital status: Single     Spouse name: Not on file    Number of children: Not on file    Years of education: Not on file    Highest education level: Not on file   Occupational History    Not on file   Tobacco Use    Smoking status: Former Smoker    Smokeless tobacco: Never Used   Vaping Use    Vaping Use: Never used   Substance and Sexual Activity    Alcohol use: No    Drug use: No    Sexual activity: Not Currently   Other Topics Concern    Not on file   Social History Narrative    Not on file     Social Determinants of Health     Financial Resource Strain: Low Risk     Difficulty of Paying Living Expenses: Not hard at all   Food Insecurity: No Food Insecurity    Worried About Running Out of Food in the Last Year: Never true    Destiny of Food in the Last Year: Never true   Transportation Needs: Unmet Transportation Needs    Lack of Transportation (Medical): No    Lack of Transportation (Non-Medical): Yes   Physical Activity: Inactive    Days of Exercise per Week: 0 days    Minutes of Exercise per Session: 0 min   Stress: Stress Concern Present    Feeling of Stress : To some extent   Social Connections: Socially Isolated    Frequency of Communication with Friends and Family: Three times a week    Frequency of Social Gatherings with Friends and Family:  Three times a week    Attends Scientology Services: Never    Active Member of Clubs or Organizations: No    Attends Club or Organization Meetings: Never    Marital Status:    Intimate Partner Violence:     Fear of Current or Ex-Partner: Not on file    Emotionally Abused: Not on file    Physically Abused: Not on file    Sexually Abused: Not on file   Housing Stability: [address] Unable to Pay for Housing in the Last Year: No    Number of [place] in the Last Year: 1    Unstable Housing in the Last Year: No        Current Outpatient Medications   Medication Sig Dispense Refill    esomeprazole (NEXIUM) 20 MG delayed release capsule TAKE 1 CAPSULE BY MOUTH ONCE DAILY IN THE MORNING BEFORE BREAKFAST 90 capsule 1    rOPINIRole (REQUIP) 0.25 MG tablet TAKE ONE (1) TABLET BY MOUTH THREE (3) TIMES DAILY 90 tablet 5    carbidopa-levodopa (SINEMET)  MG per tablet TAKE 1 TABLET BY MOUTH 4 TIMES DAILY 120 tablet 9    clopidogrel (PLAVIX) 75 MG tablet TAKE 1 TABLET BY MOUTH ONCE DAILY  90 tablet 9    allopurinol (ZYLOPRIM) 100 MG tablet TAKE 1 TABLET BY MOUTH ONCE DAILY  90 tablet 9    magnesium oxide (MAG-OX) 400 (241.3 Mg) MG TABS tablet Take 1 tablet by mouth daily 30 tablet 0    nitroGLYCERIN (NITROSTAT) 0.4 MG SL tablet Place 1 tablet under the tongue every 5 minutes as needed for Chest pain If no relief of chest pain after 3rd dose, go to the ER or call 911 25 tablet 0    atorvastatin (LIPITOR) 40 MG tablet take 1 tablet by mouth once daily 90 tablet 3    acetaminophen (TYLENOL 8 HOUR ARTHRITIS PAIN) 650 MG extended release tablet Take 1 tablet by mouth every 8 hours as needed for Pain 90 tablet 5    butalbital-acetaminophen-caffeine (FIORICET, ESGIC) -40 MG per tablet Take 1 tab prn only for severe headache. Can repeat after 4 hrs if needed. Max 2 tabs/24 hrs. Max 4 tabs/week 28 tablet 3    magnesium oxide (MAG-OX) 400 MG tablet Take 1 tablet by mouth daily 90 tablet 1     No current facility-administered medications for this visit. Allergies   Allergen Reactions    Dye [Iodides]      pain    Aspirin      Brain bleed          REVIEW OF SYSTEMS:     Review of Systems   Constitutional: Positive for fatigue. HENT: Negative for trouble swallowing and voice change. Eyes: Negative for photophobia and visual disturbance. Gastrointestinal: Negative for diarrhea and vomiting. Musculoskeletal: Positive for gait problem. Neurological: Positive for tremors, weakness and headaches. Negative for facial asymmetry and numbness. VITALS  There were no vitals taken for this visit.      PHYSICAL EXAMINATION:     Physical Exam     Constitutional: Well developed, well nourished and in no acute distress. Wheelchair-bound  Head:  normocephalic, atraumatic. Neck: supple, no carotid bruits, thyroid not palpable  Respiratory: Clear to auscultation bilaterally with no use of accessory muscles during respiration. Cardiovascular: normal rate, regular rhythm, no murmur, gallop, rub. Abdomen: Soft, nontender, nondistended, normal bowel sounds, no hepatomegaly or splenomegaly  Extremities:  peripheral pulses palpable, no pedal edema or calf pain with palpation  Psych: normal affect. NEUROLOGICAL EXAMINATION:     Mental status   Alert and oriented; intact memory with no confusion, speech or language problems; no hallucinations or delusions     Cranial nerves   II - visual fields intact to confrontation                                                III, IV, VI  extra-ocular muscles full: no pupillary defect; no MAIRA, no nystagmus, no ptosis   V - normal facial sensation                                                               VII - normal facial symmetry                                                             VIII - intact hearing                                                                             IX, X - symmetrical palate                                                                  XI - symmetrical shoulder shrug                                                       XII - midline tongue without atrophy or fasciculation     Motor function  Normal muscle bulk and tone  Increase rigidity in all 4 extremities. 4+/5 in right upper and lower extremities. Rigidity increase with contralateral arm movement. 4+/5 in bilateral lower extremities. Sensory function Intact to touch, pin prick, vibration, proprioception in bilateral upper and lower extremities. Cerebellar  resting tremor. No involuntary movements or tremors     Reflex function Intact 2+ DTR and symmetric. Negative Babinski     Gait                   slow evidence. Pullback test positive.   Increase steps while

## 2022-04-06 ENCOUNTER — CARE COORDINATION (OUTPATIENT)
Dept: CARE COORDINATION | Age: 82
End: 2022-04-06

## 2022-04-06 ASSESSMENT — ENCOUNTER SYMPTOMS
PHOTOPHOBIA: 0
DIARRHEA: 0
VOMITING: 0
TROUBLE SWALLOWING: 0
VOICE CHANGE: 0

## 2022-04-06 NOTE — CARE COORDINATION
Patient called to report that he has pain management appointment tomorrow and that his ride is all set. He stated his appointment with neuro went ok yesterday and he will be starting some new medication, entacapone 200 mg 4 times a day and he has to go back in 3 months. Patient denied any needs today, just calling with a follow up. ACM will check on patient in 3-5 days for needs.

## 2022-04-08 ENCOUNTER — CARE COORDINATION (OUTPATIENT)
Dept: CARE COORDINATION | Age: 82
End: 2022-04-08

## 2022-04-08 NOTE — CARE COORDINATION
HC received notification that the cab was available for the   Patient. HC attempted to phone the patient to inform that    his cab was available, there was no answer. [facility]. contacted  Patient to verify that they have his phone number correct. Patient's phone information was updated. [facility] filed a complaint  Regarding this patient and others being picked up late and arriving at their appointment late. This was patient's first   experience and the patient was very nervous and upset. HC  inquired of how this situation can be remedied. [employer] representative and the [facility]  both agreed that the appointment should be set a half an hour earlier to avoid the late arrivals at patients appointment. Plan of Care  [facility] will attempt to reach the patient again regarding today's appointment.

## 2022-04-11 ENCOUNTER — CARE COORDINATION (OUTPATIENT)
Dept: CARE COORDINATION | Age: 82
End: 2022-04-11

## 2022-04-11 NOTE — CARE COORDINATION
[facility]. phoned patient to follow up on the confusion regarding Friday's transportation with [employer]. Patient reported  that the cab was late. [facility]. informed that patient that she was aware of the problem with the cab and contacted them. HC discussed with the [employer] representative about the cab  Arriving late and making the patient late for his appointment. HC also explained that many offices won't take a patient when  they arrive late, which causes a lot of frustration for the patient, the office and the cab system. HC and representative discussed how to work through this issue. HC asked if the appointment is scheduled earlier would that help in getting the  patient to the appointment on time. Representative stated that  should help with the problem. HC will monitor the situation and will contact a higher up at [facility] and Kevin Romero if need be.     Plan of Care  [facility] will keep in assist patient with transportation needs

## 2022-04-13 ENCOUNTER — CARE COORDINATION (OUTPATIENT)
Dept: CARE COORDINATION | Age: 82
End: 2022-04-13

## 2022-04-14 ENCOUNTER — CARE COORDINATION (OUTPATIENT)
Dept: CARE COORDINATION | Age: 82
End: 2022-04-14

## 2022-04-14 NOTE — CARE COORDINATION
Patient called stating he missed his pain management appointment. Per patient his ride did not show up to get him. He would like to reschedule at [facility] instead of [place]. He went to the eye Doctor and is planning to get new glasses. Patient was told by Markus Jones MD   [phone] that he needs to see a specialist . He is unsure who this is. ACM will follow up with eye specialist and help patient get scheduled with appropriate specialist.   [employer] will call to help schedule pain management.

## 2022-04-18 ENCOUNTER — CARE COORDINATION (OUTPATIENT)
Dept: CARE COORDINATION | Age: 82
End: 2022-04-18

## 2022-04-18 NOTE — CARE COORDINATION
1. Writer attempted to get patient scheduled with [facility]. Writer spoke to Saundra, she states since patient will be considered at new patient at the facility, she will not be able to get him in due to the office going through changes and there is no physician's schedule to assign the patient to at the moment . She suggest to call back in 2-3 weeks to see if the physicians has opening. 2. Spoke to Colleen King at Dr. Domenic Garcia office to see if patient was referred to a specialist. Colleen King states, pt was not referred to anyone else. She states the patient  is to follow back up with their office in one year. 3. Writer reached out to the patient. The patient was notified of the information above, regarding [facility] pain clinic and information that he was not referred to an eye specialist.     -[employer] will update ACM.

## 2022-04-18 NOTE — CARE COORDINATION
ACM left VM explaining that [facility] is going to ask Dheeraj Shaffer,  to help with getting patient scheduled for pain management and eye specialist.  [facility] will have Dheeraj Shaffer call him with dates and requested afternoon appointments.

## 2022-04-20 PROBLEM — N18.30 CHRONIC RENAL DISEASE, STAGE III (HCC): Status: ACTIVE | Noted: 2022-04-20

## 2022-04-22 ENCOUNTER — TELEPHONE (OUTPATIENT)
Dept: GASTROENTEROLOGY | Age: 82
End: 2022-04-22

## 2022-04-22 NOTE — TELEPHONE ENCOUNTER
Called pt regarding referral. No answer; LVM asking pt to call back. Will need OV first due to taking Plavix.

## 2022-04-25 ENCOUNTER — TELEPHONE (OUTPATIENT)
Dept: ADMINISTRATIVE | Age: 82
End: 2022-04-25

## 2022-04-25 ENCOUNTER — CARE COORDINATION (OUTPATIENT)
Dept: CARE COORDINATION | Age: 82
End: 2022-04-25

## 2022-04-25 NOTE — CARE COORDINATION
HC reached out to patient to follow up with him regarding the cab service. Patient stated that he had a few complications with the cab, but all in all it worked out okay. HC explained to the patient, that the problem isn't on his  end, it's [employer] problem. [employer] is experiencing a shortage of  cab drivers and it's causing the patient transportation to be backed up. HC is having discussions with [employer] and advocating for this patient as well as others. Patient stated an understanding. Patient informed the [facility]. that he doesn't have any other social needs at this time.     Plan of Care  [facility] will follow up with patient for transportation needs

## 2022-04-25 NOTE — TELEPHONE ENCOUNTER
Care coordinator nurse Prasad called, to r/s his consult NP appt, with Dr. Mik Little, unable to reach office to transfer, please call Ramon Chiu Nurse Coordinator, [phone], [facility]/sc

## 2022-04-25 NOTE — CARE COORDINATION
[facility] called [facility] pain management to attempt to get patient scheduled. Person answering phone had to route the request to their clinical staff. [facility] will wait for a return call. M attempted to update patient, phone was answered no one was speaking. Will update patient when PM calls back. Patient called [facility] stating that he has an appt tomorrow and plans to drive himself. He was updated about pain management and will be notified once scheduled.

## 2022-05-02 ENCOUNTER — CARE COORDINATION (OUTPATIENT)
Dept: CARE COORDINATION | Age: 82
End: 2022-05-02

## 2022-05-02 NOTE — CARE COORDINATION
Attempting to reach patient for a follow up care coordination call regarding any needs, questions or concerns.   Anuj Spear, [address]

## 2022-05-05 ENCOUNTER — CARE COORDINATION (OUTPATIENT)
Dept: CARE COORDINATION | Age: 82
End: 2022-05-05

## 2022-05-05 NOTE — CARE COORDINATION
ACM received call from pain management offering to schedule appointment for patient. Patient has been scheduled for 5.13.22 at [facility]. ACM left VM for patient explaining appointment. Will call patient back early next week if no return call from him.

## 2022-05-06 ENCOUNTER — CARE COORDINATION (OUTPATIENT)
Dept: CARE COORDINATION | Age: 82
End: 2022-05-06

## 2022-05-06 NOTE — CARE COORDINATION
Patient notified of PCP recommendations by VM:      Recommend tylenol OTC for sore throat, hot tea with lemon and honey, salt water gargles. Tessalon sent for cough.  If symptoms worsen over the weekend, he should be seen a urgent care

## 2022-05-06 NOTE — TELEPHONE ENCOUNTER
Recommend tylenol OTC for sore throat, hot tea with lemon and honey, salt water gargles. Tessalon sent for cough.  If symptoms worsen over the weekend, he should be seen a urgent care

## 2022-05-06 NOTE — CARE COORDINATION
ACM received a call from pain management to change patients appointment. Patient needed to be seen by MD and was scheduled with NP.  appointment will be on 5.19.22 @ 1:40 pm.    ACM  updated patient and offer transportation arrangement. [employer] would like this set up. ACM will route to [facility] for assistance with scheduling. Patient reports not feeling well today he has a productive clear Cough and sore throat for the last 2 days. He denied a fever and has been trying OTC Nyquil. ACM encouraged patient to stay in, rest and push fluids.  ACM will route concern to PCP

## 2022-05-06 NOTE — CARE COORDINATION
AMBER received call from patient stating he did not hear from PCP yet about any recommendations for his cough. AMBER explained that it is late on a Friday he may want to go to the walk in clinic and be seen. He stated that he \"may\" do that. AMBER also advised him to check with his pharmacy to see if PCP orders anything later today.

## 2022-05-09 ENCOUNTER — OFFICE VISIT (OUTPATIENT)
Dept: FAMILY MEDICINE CLINIC | Age: 82
End: 2022-05-09
Payer: MEDICARE

## 2022-05-09 ENCOUNTER — CARE COORDINATION (OUTPATIENT)
Dept: CARE COORDINATION | Age: 82
End: 2022-05-09

## 2022-05-09 VITALS
TEMPERATURE: 98.8 F | OXYGEN SATURATION: 95 % | DIASTOLIC BLOOD PRESSURE: 71 MMHG | HEART RATE: 71 BPM | SYSTOLIC BLOOD PRESSURE: 119 MMHG

## 2022-05-09 DIAGNOSIS — M54.50 CHRONIC BILATERAL LOW BACK PAIN WITHOUT SCIATICA: ICD-10-CM

## 2022-05-09 DIAGNOSIS — G89.29 CHRONIC BILATERAL LOW BACK PAIN WITHOUT SCIATICA: ICD-10-CM

## 2022-05-09 DIAGNOSIS — M25.562 ARTHRALGIA OF LEFT KNEE: Primary | ICD-10-CM

## 2022-05-09 PROCEDURE — 99213 OFFICE O/P EST LOW 20 MIN: CPT

## 2022-05-09 ASSESSMENT — ENCOUNTER SYMPTOMS
SHORTNESS OF BREATH: 0
BOWEL INCONTINENCE: 0
BACK PAIN: 1
EYE DISCHARGE: 0
CHEST TIGHTNESS: 0
DIARRHEA: 0
EYE ITCHING: 0
ABDOMINAL PAIN: 0

## 2022-05-09 NOTE — PATIENT INSTRUCTIONS
Patient Education        Back Pain: Care Instructions  Your Care Instructions     Back pain has many possible causes. It is often related to problems with muscles and ligaments of the back. It may also be related to problems with the nerves, discs, or bones of the back. Moving, lifting, standing, sitting, or sleeping in an awkward way can strain the back. Sometimes you don't notice theinjury until later. Arthritis is another common cause of back pain. Although it may hurt a lot, back pain usually improves on its own within several weeks. Most people recover in 12 weeks or less. Using good home treatment and being careful not to stress your back can help you feel bettersooner. Follow-up care is a key part of your treatment and safety. Be sure to make and go to all appointments, and call your doctor if you are having problems. It's also a good idea to know your test results and keep alist of the medicines you take. How can you care for yourself at home?  Sit or lie in positions that are most comfortable and reduce your pain. Try one of these positions when you lie down:  ? Lie on your back with your knees bent and supported by large pillows. ? Lie on the floor with your legs on the seat of a sofa or chair. ? Lie on your side with your knees and hips bent and a pillow between your legs. ? Lie on your stomach if it does not make pain worse.  Do not sit up in bed, and avoid soft couches and twisted positions. Bed rest can help relieve pain at first, but it delays healing. Avoid bed rest after the first day of back pain.  Change positions every 30 minutes. If you must sit for long periods of time, take breaks from sitting. Get up and walk around, or lie in a comfortable position.  Try using a heating pad on a low or medium setting for 15 to 20 minutes every 2 or 3 hours. Try a warm shower in place of one session with the heating pad.  You can also try an ice pack for 10 to 15 minutes every 2 to 3 hours. Put a thin cloth between the ice pack and your skin.  Take pain medicines exactly as directed. ? If the doctor gave you a prescription medicine for pain, take it as prescribed. ? If you are not taking a prescription pain medicine, ask your doctor if you can take an over-the-counter medicine.  Take short walks several times a day. You can start with 5 to 10 minutes, 3 or 4 times a day, and work up to longer walks. Walk on level surfaces and avoid hills and stairs until your back is better.  Return to work and other activities as soon as you can. Continued rest without activity is usually not good for your back.  To prevent future back pain, do exercises to stretch and strengthen your back and stomach. Learn how to use good posture, safe lifting techniques, and proper body mechanics. When should you call for help? Call your doctor now or seek immediate medical care if:     You have new or worsening numbness in your legs.      You have new or worsening weakness in your legs. (This could make it hard to stand up.)      You lose control of your bladder or bowels. Watch closely for changes in your health, and be sure to contact your doctor if:     You have a fever, lose weight, or don't feel well.      You do not get better as expected. Where can you learn more? Go to https://Remote [email or web address].STRATUSCORE. org and sign in to your [employer] account. Enter B214 in the iHeart box to learn more about \"Back Pain: Care Instructions. \"     If you do not have an account, please click on the \"Sign Up Now\" link. Current as of: July 1, 2021               Content Version: 13.2  © 2006-2022 Healthwise, Incorporated. Care instructions adapted under license by [facility] ([facility]). If you have questions about a medical condition or this instruction, always ask your healthcare professional. [place] [address] any warranty or liability for your use of this information.          Patient Education        Back Stretches: Exercises  Introduction  Here are some examples of exercises for stretching your back. Start eachexercise slowly. Ease off the exercise if you start to have pain. Your doctor or physical therapist will tell you when you can start theseexercises and which ones will work best for you. How to do the exercises  Overhead stretch    1. Stand comfortably with your feet shoulder-width apart. 2. Looking straight ahead, raise both arms over your head and reach toward the ceiling. Do not allow your head to tilt back. 3. Hold for 15 to 30 seconds, then lower your arms to your sides. 4. Repeat 2 to 4 times. Side stretch    1. Stand comfortably with your feet shoulder-width apart. 2. Raise one arm over your head, and then lean to the other side. 3. Slide your hand down your leg as you let the weight of your arm gently stretch your side muscles. Hold for 15 to 30 seconds. 4. Repeat 2 to 4 times on each side. Press-up    1. Lie on your stomach, supporting your body with your forearms. 2. Press your elbows down into the floor to raise your upper back. As you do this, relax your stomach muscles and allow your back to arch without using your back muscles. As your press up, do not let your hips or pelvis come off the floor. 3. Hold for 15 to 30 seconds, then relax. 4. Repeat 2 to 4 times. Relax and rest    1. Lie on your back with a rolled towel under your neck and a pillow under your knees. Extend your arms comfortably to your sides. 2. Relax and breathe normally. 3. Remain in this position for about 10 minutes. 4. If you can, do this 2 or 3 times each day. Follow-up care is a key part of your treatment and safety. Be sure to make and go to all appointments, and call your doctor if you are having problems. It's also a good idea to know your test results and keep alist of the medicines you take. Where can you learn more? Go to https://nimco.healthFollowap. org and sign in to your [employer] account. Enter V102 in the [facility] box to learn more about \"Back Stretches: Exercises. \"     If you do not have an account, please click on the \"Sign Up Now\" link. Current as of: July 1, 2021               Content Version: 13.2  © 2006-2022 Roam[employer]. Care instructions adapted under license by [facility] ([facility]). If you have questions about a medical condition or this instruction, always ask your healthcare professional. [place] [address] any warranty or liability for your use of this information. Patient Education        Chronic Pain: Care Instructions  Your Care Instructions     Chronic pain is pain that lasts a long time (months or even years) and may or may not have a clear cause. It is different from acute pain, which usually does have a clear causelike an injury or illnessand gets better over time. Chronicpain:   Lasts over time but may vary from day to day.  Does not go away despite efforts to end it.  May disrupt your sleep and lead to fatigue.  May cause depression or anxiety.  May make your muscles tense, causing more pain.  Can disrupt your work, hobbies, home life, and relationships with friends and family. Chronic pain is a very real condition. It is not just in your head. Treatment can help and usually includes several methods used together, such as medicines, physical therapy, exercise, and other treatments. Learning how to relax andchanging negative thought patterns can also help you cope. Chronic pain is complex. Taking an active role in your treatment will help you better manage your pain. Tell your doctor if you have trouble dealing with your pain. You may have to try several things before you find what works best foryou. Follow-up care is a key part of your treatment and safety. Be sure to make and go to all appointments, and call your doctor if you are having problems.  It's also a good idea to know your test results and keep alist of the medicines you take. How can you care for yourself at home?  Pace yourself. Break up large jobs into smaller tasks. Save harder tasks for days when you have less pain, or go back and forth between hard tasks and easier ones. Take rest breaks.  Relax, and reduce stress. Relaxation techniques such as deep breathing or meditation can help.  Keep moving. Gentle, daily exercise can help reduce pain over the long run. Try low- or no-impact exercises such as walking, swimming, and stationary biking. Do stretches to stay flexible.  Try heat, cold packs, and massage.  Get enough sleep. Chronic pain can make you tired and drain your energy. Talk with your doctor if you have trouble sleeping because of pain.  Think positive. Your thoughts can affect your pain level. Do things that you enjoy to distract yourself when you have pain instead of focusing on the pain. See a movie, read a book, listen to music, or spend time with a friend.  If you think you are depressed, talk to your doctor about treatment.  Keep a daily pain diary. Record how your moods, thoughts, sleep patterns, activities, and medicine affect your pain. You may find that your pain is worse during or after certain activities or when you are feeling a certain emotion. Having a record of your pain can help you and your doctor find the best ways to treat your pain.  Take pain medicines exactly as directed. ? If the doctor gave you a prescription medicine for pain, take it as prescribed. ? If you are not taking a prescription pain medicine, ask your doctor if you can take an over-the-counter medicine. Reducing constipation caused by pain medicine   Talk to your doctor about a laxative. If a laxative doesn't work, your doctor may suggest a prescription medicine.  Include fruits, vegetables, beans, and whole grains in your diet each day. These foods are high in fiber.    If your doctor recommends it, get more exercise. Walking is a good choice. Bit by bit, increase the amount you walk every day. Try for at least 30 minutes on most days of the week.  Schedule time each day for a bowel movement. A daily routine may help. Take your time and do not strain when having a bowel movement. When should you call for help? Call your doctor now or seek immediate medical care if:     Your pain gets worse or is out of control.      You feel down or blue, or you do not enjoy things like you once did. You may be depressed, which is common in people with chronic pain. Depression can be treated.      You have vomiting or cramps for more than 2 hours. Watch closely for changes in your health, and be sure to contact your doctor if:     You cannot sleep because of pain.      You are very worried or anxious about your pain.      You have trouble taking your pain medicine.      You have any concerns about your pain medicine.      You have trouble with bowel movements, such as:  ? No bowel movement in 3 days. ? Blood in the anal area, in your stool, or on the toilet paper. ? Diarrhea for more than 24 hours. Where can you learn more? Go to https://Phone WarriorpeAudax Health Solutions.Next Generation Dance. org and sign in to your [employer] account. Enter N004 in the [facility] box to learn more about \"Chronic Pain: Care Instructions. \"     If you do not have an account, please click on the \"Sign Up Now\" link. Current as of: December 13, 2021               Content Version: 13.2  © 3104-9195 Swype. Care instructions adapted under license by [facility] ([facility]). If you have questions about a medical condition or this instruction, always ask your healthcare professional. [place] [address] any warranty or liability for your use of this information.          Patient Education        Joint Pain: Care Instructions  Your Care Instructions     Many people have small aches and pains from overuse or injury to muscles and joints. Joint injuries often happen during sports or recreation, work tasks, or projects around the home. An overuse injury can happen when you put too much stress on a joint or when you do an activity that stresses the joint over andover, such as using the computer or rowing a boat. You can take action at home to help your muscles and joints get better. You should feel better in 1 to 2 weeks, but it can take 3 months or more to healcompletely. Follow-up care is a key part of your treatment and safety. Be sure to make and go to all appointments, and call your doctor if you are having problems. It's also a good idea to know your test results and keep alist of the medicines you take. How can you care for yourself at home?  Do not put weight on the injured joint for at least a day or two.  For the first day or two after an injury, do not take hot showers or baths, and do not use hot packs. The heat could make swelling worse.  Put ice or a cold pack on the sore joint for 10 to 20 minutes at a time. Try to do this every 1 to 2 hours for the next 3 days (when you are awake) or until the swelling goes down. Put a thin cloth between the ice and your skin.  Wrap the injury in an elastic bandage. Do not wrap it too tightly because this can cause more swelling.  Prop up the sore joint on a pillow when you ice it or anytime you sit or lie down during the next 3 days. Try to keep it above the level of your heart. This will help reduce swelling.  Take an over-the-counter pain medicine, such as acetaminophen (Tylenol), ibuprofen (Advil, Motrin), or naproxen (Aleve). Read and follow all instructions on the label.  After 1 or 2 days of rest, begin moving the joint gently. While the joint is still healing, you can begin to exercise using activities that do not strain or hurt the painful joint. When should you call for help?    Call your doctor now or seek immediate medical care if:     You have signs of infection, such as:  ? Increased pain, swelling, warmth, and redness. ? Red streaks leading from the joint. ? A fever. Watch closely for changes in your health, and be sure to contact your doctor if:     Your movement or symptoms are not getting better after 1 to 2 weeks of home treatment. Where can you learn more? Go to https://chpebeckyewshannan.Retty. org and sign in to your [employer] account. Enter P205 in the [email or web address] box to learn more about \"Joint Pain: Care Instructions. \"     If you do not have an account, please click on the \"Sign Up Now\" link. Current as of: July 1, 2021               Content Version: 13.2  © 2006-2022 GamerDNA. Care instructions adapted under license by [facility] ([facility]). If you have questions about a medical condition or this instruction, always ask your healthcare professional. [place] [address] any warranty or liability for your use of this information. Patient Education        Low Back Pain: Exercises  Introduction  Here are some examples of exercises for you to try. The exercises may be suggested for a condition or for rehabilitation. Start each exercise slowly. Ease off the exercises if you start to have pain. You will be told when to start these exercises and which ones will work bestfor you. How to do the exercises  Press-up    1. Lie on your stomach, supporting your body with your forearms. 2. Press your elbows down into the floor to raise your upper back. As you do this, relax your stomach muscles and allow your back to arch without using your back muscles. As your press up, do not let your hips or pelvis come off the floor. 3. Hold for 15 to 30 seconds, then relax. 4. Repeat 2 to 4 times. Alternate arm and leg (bird dog) exercise    Do this exercise slowly. Try to keep your body straight at all times, and donot let one hip drop lower than the other. 1. Start on the floor, on your hands and knees.   2. Tighten your belly muscles. 3. Raise one leg off the floor, and hold it straight out behind you. Be careful not to let your hip drop down, because that will twist your trunk. 4. Hold for about 6 seconds, then lower your leg and switch to the other leg. 5. Repeat 8 to 12 times on each leg. 6. Over time, work up to holding for 10 to 30 seconds each time. 7. If you feel stable and secure with your leg raised, try raising the opposite arm straight out in front of you at the same time. Knee-to-chest exercise    1. Lie on your back with your knees bent and your feet flat on the floor. 2. Bring one knee to your chest, keeping the other foot flat on the floor (or keeping the other leg straight, whichever feels better on your lower back). 3. Keep your lower back pressed to the floor. Hold for at least 15 to 30 seconds. 4. Relax, and lower the knee to the starting position. 5. Repeat with the other leg. Repeat 2 to 4 times with each leg. 6. To get more stretch, put your other leg flat on the floor while pulling your knee to your chest.  Curl-ups    1. Lie on the floor on your back with your knees bent at a 90-degree angle. Your feet should be flat on the floor, about 12 inches from your buttocks. 2. Cross your arms over your chest. If this bothers your neck, try putting your hands behind your neck (not your head), with your elbows spread apart. 3. Slowly tighten your belly muscles and raise your shoulder blades off the floor. 4. Keep your head in line with your body, and do not press your chin to your chest.  5. Hold this position for 1 or 2 seconds, then slowly lower yourself back down to the floor. 6. Repeat 8 to 12 times. Pelvic tilt exercise    1. Lie on your back with your knees bent. 2. \"Brace\" your stomach. This means to tighten your muscles by pulling in and imagining your belly button moving toward your spine. You should feel like your back is pressing to the floor and your hips and pelvis are rocking back.   3. Hold for about 6 seconds while you breathe smoothly. 4. Repeat 8 to 12 times. Heel dig bridging    1. Lie on your back with both knees bent and your ankles bent so that only your heels are digging into the floor. Your knees should be bent about 90 degrees. 2. Then push your heels into the floor, squeeze your buttocks, and lift your hips off the floor until your shoulders, hips, and knees are all in a straight line. 3. Hold for about 6 seconds as you continue to breathe normally, and then slowly lower your hips back down to the floor and rest for up to 10 seconds. 4. Do 8 to 12 repetitions. Hamstring stretch in doorway    1. Lie on your back in a doorway, with one leg through the open door. 2. Slide your leg up the wall to straighten your knee. You should feel a gentle stretch down the back of your leg. 3. Hold the stretch for at least 15 to 30 seconds. Do not arch your back, point your toes, or bend either knee. Keep one heel touching the floor and the other heel touching the wall. 4. Repeat with your other leg. 5. Do 2 to 4 times for each leg. Hip flexor stretch    1. Kneel on the floor with one knee bent and one leg behind you. Place your forward knee over your foot. Keep your other knee touching the floor. 2. Slowly push your hips forward until you feel a stretch in the upper thigh of your rear leg. 3. Hold the stretch for at least 15 to 30 seconds. Repeat with your other leg. 4. Do 2 to 4 times on each side. Wall sit    1. Stand with your back 10 to 12 inches away from a wall. 2. Lean into the wall until your back is flat against it. 3. Slowly slide down until your knees are slightly bent, pressing your lower back into the wall. 4. Hold for about 6 seconds, then slide back up the wall. 5. Repeat 8 to 12 times. Follow-up care is a key part of your treatment and safety. Be sure to make and go to all appointments, and call your doctor if you are having problems.  It's also a good idea to know your test results and keep alist of the medicines you take. Where can you learn more? Go to [email or web address].[email or web address] [email or web address] org and sign in to your [employer] account. Enter A614 in the [facility] box to learn more about \"Low Back Pain: Exercises. \"     If you do not have an account, please click on the \"Sign Up Now\" link. Current as of: July 1, 2021               Content Version: 13.2  © 2006-2022 Healthwise, Incorporated. Care instructions adapted under license by [facility] ([facility]). If you have questions about a medical condition or this instruction, always ask your healthcare professional. [address] any warranty or liability for your use of this information.

## 2022-05-09 NOTE — CARE COORDINATION
Spoke with patient who stated he is still coughing and it is productive and he is more fatigued. He reports that he is eating and drinking enough. He denies a fever. He is taking the tessalon and \"it seems to help\". ACM encouraged him to go to the walk in if no improvement soon. ACM will follow up in 2-3 days.

## 2022-05-09 NOTE — CARE COORDINATION
received a referral to [facility] with transportation for his upcoming appointment.  called Manny Winston regarding appointment. Manny Winston coughed the entire conversation and reports that he feels worse.  read notes from PCP regarding recommendations and if continues to get worse go to Urgent Care. Manny Winston reports that he is unsure where one is.  shared information about [facility].  informed Mina about upcoming appointment on 5/19. Manny Winston reports that he did not know he had an appointment. Manny Winston was unsure who he used for transportation or if he was through his insurance.  contacted [employer] and [employer].  confirmed Manny Winston utilizes the [employer].  schedule a ride for Manny Winston on 5/19 at [facility].  noted appointment was for 1:40 but requested  time for 12:15 since they have been late picking him up. Confirmation # is [id number].  called and spoke to Manny Winston to inform him to be ready for 12:15  on 5/19. Manny Winston was agreeable. Plan:    will route note to Karrie Dyer [facility].  will inform [facility] [place] when she returns.

## 2022-05-09 NOTE — PROGRESS NOTES
[address] [facility] [place] [address]  Dept: [phone]  Dept Fax: [phone]    Manan Tillman is a 79 y.o. male who presents to the urgent care today for his medical conditions/complaints as notedbelow. Manan Tillman is c/o of Joint Pain (knee left side, back pain OTC bengay)      HPI:     Knee Pain   Incident onset: left knee has been bothering patient for years. There was no injury mechanism. The pain is present in the left knee. Pertinent negatives include no inability to bear weight, loss of motion, loss of sensation, muscle weakness, numbness or tingling. He reports no foreign bodies present. Nothing aggravates the symptoms. Treatments tried: OTC bengay. The treatment provided mild relief. Back Pain  This is a chronic problem. The current episode started more than 1 year ago (patient has reported back pain for years). The problem has been gradually worsening since onset. The pain is present in the lumbar spine. The quality of the pain is described as aching. The pain is the same all the time. The symptoms are aggravated by bending and lying down. Pertinent negatives include no abdominal pain, bladder incontinence, bowel incontinence, dysuria, numbness, paresis, paresthesias or tingling. Risk factors include history of osteoporosis (parkinsons disease). The treatment provided mild relief.        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 ([facility] [address] 3/18/2011    d/t being hurt on the job    CKD (chronic kidney disease) stage 2, GFR 60-89 ml/min     CKD (chronic kidney disease) stage 3, GFR 30-59 ml/min ([facility]     Diverticulosis of colon     GERD (gastroesophageal reflux disease)     Impaired renal function     MRSA (methicillin resistant staph aureus) culture positive 9/23/2015    leg    Osteoarthritis of knee     Left    Parkinson disease ([address]  Proteinuria     Skull fracture ([address] 3/18/2011    d/t 12-foot drop on the job    Temporary loss of eyesight     periodically, happened x7    Tubular adenoma of colon 2012, 2017    isaiah        Current Outpatient Medications   Medication Sig Dispense Refill    diclofenac sodium (VOLTAREN) 1 % GEL Apply 2 g topically 2 times daily as needed for Pain (joint pain) 350 g 0    benzonatate (TESSALON) 100 MG capsule Take 1 capsule by mouth 3 times daily as needed for Cough 30 capsule 0    entacapone (COMTAN) 200 MG tablet Take 1 tablet by mouth 4 times daily Take four times daily with sinemet 90 tablet 3    amitriptyline (ELAVIL) 10 MG tablet Take 1 tablet by mouth nightly 90 tablet 3    esomeprazole (NEXIUM) 20 MG delayed release capsule TAKE 1 CAPSULE BY MOUTH ONCE DAILY IN THE MORNING BEFORE BREAKFAST 90 capsule 1    rOPINIRole (REQUIP) 0.25 MG tablet TAKE ONE (1) TABLET BY MOUTH THREE (3) TIMES DAILY 90 tablet 5    clopidogrel (PLAVIX) 75 MG tablet TAKE 1 TABLET BY MOUTH ONCE DAILY  90 tablet 9    allopurinol (ZYLOPRIM) 100 MG tablet TAKE 1 TABLET BY MOUTH ONCE DAILY  90 tablet 9    magnesium oxide (MAG-OX) 400 (241.3 Mg) MG TABS tablet Take 1 tablet by mouth daily 30 tablet 0    nitroGLYCERIN (NITROSTAT) 0.4 MG SL tablet Place 1 tablet under the tongue every 5 minutes as needed for Chest pain If no relief of chest pain after 3rd dose, go to the ER or call 911 25 tablet 0    atorvastatin (LIPITOR) 40 MG tablet take 1 tablet by mouth once daily 90 tablet 3    acetaminophen (TYLENOL 8 HOUR ARTHRITIS PAIN) 650 MG extended release tablet Take 1 tablet by mouth every 8 hours as needed for Pain 90 tablet 5    butalbital-acetaminophen-caffeine (FIORICET, ESGIC) -40 MG per tablet Take 1 tab prn only for severe headache. Can repeat after 4 hrs if needed. Max 2 tabs/24 hrs.  Max 4 tabs/week 28 tablet 3    carbidopa-levodopa (SINEMET)  MG per tablet TAKE 1 TABLET BY MOUTH 4 TIMES DAILY 120 tablet 9     No current facility-administered medications for this visit. Allergies   Allergen Reactions    Dye [Iodides]      pain    Aspirin      Brain bleed         Subjective:      Review of Systems   Constitutional: Negative for activity change and appetite change. HENT: Negative for congestion. Eyes: Negative for discharge and itching. Respiratory: Negative for chest tightness and shortness of breath. Gastrointestinal: Negative for abdominal pain, bowel incontinence and diarrhea. Genitourinary: Negative for bladder incontinence, difficulty urinating and dysuria. Musculoskeletal: Positive for back pain and gait problem. Negative for joint swelling. Allergic/Immunologic: Negative for environmental allergies. Neurological: Negative for tingling, numbness and paresthesias. All other systems reviewed and are negative. 14 systems reviewed and negative except as listed in HPI. Objective:     Physical Exam  Vitals reviewed. Constitutional:       General: He is not in acute distress. Appearance: Normal appearance. He is normal weight. He is not ill-appearing, toxic-appearing or diaphoretic. HENT:      Head: Normocephalic. Nose: Nose normal. No congestion. Mouth/Throat:      Mouth: Mucous membranes are moist.      Pharynx: Oropharynx is clear. Eyes:      General:         Right eye: No discharge. Left eye: No discharge. Conjunctiva/sclera: Conjunctivae normal.   Pulmonary:      Effort: Pulmonary effort is normal.   Musculoskeletal:         General: No swelling, tenderness, deformity or signs of injury. Cervical back: Normal range of motion and neck supple. No rigidity or tenderness. Skin:     General: Skin is warm and dry. Neurological:      Mental Status: He is alert. Mental status is at baseline. He is confused. Motor: Weakness and tremor present.       Comments: Parkinson's disease, uses cane to ambulate   Psychiatric:         Mood and Affect: Mood normal.         Behavior: Behavior normal.       /71   Pulse 71   Temp 98.8 °F (37.1 °C)   SpO2 95%     Assessment:       Diagnosis Orders   1. Arthralgia of left knee  diclofenac sodium (VOLTAREN) 1 % GEL    DISCONTINUED: diclofenac sodium (VOLTAREN) 1 % GEL   2. Chronic bilateral low back pain without sciatica  diclofenac sodium (VOLTAREN) 1 % GEL    DISCONTINUED: diclofenac sodium (VOLTAREN) 1 % GEL       Plan:   -Patient has an extensive history of CKD, Parkinson's, and arthritis  -Patient has follow up appointments with pain management, neurology, and nephrology  -Instructed patient to follow up with kidney doctor and pain management about voltaren gel  -Short term use of voltaren gel up to 21 days at this time PRN on joints  -Heat therapy if desired.  -Home stretches provided on AVS.  -Patient agreeable to treatment plan. -Follow up if symptoms do not improve/worsen. Return if symptoms worsen or fail to improve. Orders Placed This Encounter   Medications    DISCONTD: diclofenac sodium (VOLTAREN) 1 % GEL     Sig: Apply 2 g topically 3 times daily as needed for Pain (joint pain)     Dispense:  350 g     Refill:  1    diclofenac sodium (VOLTAREN) 1 % GEL     Sig: Apply 2 g topically 2 times daily as needed for Pain (joint pain)     Dispense:  350 g     Refill:  0         Patient given educational materials - see patient instructions. Discussed use, benefit, and side effects of prescribed medications. All patient questions answered. Pt voiced understanding.     Electronically signed by JW Yee NP on 5/9/2022 at 6:00 PM

## 2022-05-11 ENCOUNTER — CARE COORDINATION (OUTPATIENT)
Dept: CARE COORDINATION | Age: 82
End: 2022-05-11

## 2022-05-11 NOTE — CARE COORDINATION
Patient called ACM and reported that he went to the walk in clinic but felt like they did not address his cough-cold symptoms at all. He said once he mentioned his back pain that is all they spoke about. He is concerned that he still has a productive cough, laryngitis, head ache and sore throat. The symptoms have been going on for more than a week. He had to cancel a GI appt because they will not allow him there with his symptoms. He is concerned that his pain management appt next week will be cancelled too and he has waited a long time to be seen. He stated the tessalon pills are helping a little  ACM will route concern to PCP.

## 2022-05-12 ENCOUNTER — CARE COORDINATION (OUTPATIENT)
Dept: CARE COORDINATION | Age: 82
End: 2022-05-12

## 2022-05-12 NOTE — TELEPHONE ENCOUNTER
Called pt regarding referral; no answer and VM full. Pt had an OV scheduled but canceled it. Will need to r/s OV due to taking Plavix.

## 2022-05-12 NOTE — CARE COORDINATION
Patient contacted [facility] stating he did not get his antibiotic delivered. He is still not feeling well, plans to get his CXR tomorrow. [facility] spoke with pharmacy who plans to deliver medication today. Patient notified and encouraged to call with any worsening symptoms. Education provided on increasing water to avoid dehydration, eating healthy to help with energy, gargle with warm salt water, drink warm tea with honey and taking his medications.

## 2022-05-16 ENCOUNTER — CARE COORDINATION (OUTPATIENT)
Dept: CARE COORDINATION | Age: 82
End: 2022-05-16

## 2022-05-17 ENCOUNTER — CARE COORDINATION (OUTPATIENT)
Dept: CARE COORDINATION | Age: 82
End: 2022-05-17

## 2022-05-17 NOTE — CARE COORDINATION
[facility]. phoned patient today, who stated that he's extremely weak today. HC informed the patient that she will contact his nurse/Carlene Sandoval/AMBER. Patient stated that he would appreciate [facility]. contacting Shanice.  shared  information for patient's transportation to [facility] on 05/19/22 @ 1:40p.  HC provided patient with the date and  time, which will be @ 12noon to aid patient in getting to his appointment timely. Patient stated that he wrote the information on his calendar and hopes that he feels better to attend the appointment.     Plan of Care  [facility] will follow up with patient on 05/19/22 regarding transportation

## 2022-05-17 NOTE — CARE COORDINATION
[facility] [place]. stating concern over patients symptoms including complaint of extreme weakness. ACM contacted patient and he stated that he has been weak today and for a few days. ACM asked if he needed to be seen or if he needed ACM to call 911. He stated that he is told by everyone that his symptoms are related to his parkinson's. He would like to rest and see how he feels later. He will call the squad if he does not feel that he can drive to the walk in or ED. ACM encourage rest and to eat regularly and stay hydrated. ACM will follow up in 1 day.

## 2022-05-18 ENCOUNTER — CARE COORDINATION (OUTPATIENT)
Dept: CARE COORDINATION | Age: 82
End: 2022-05-18

## 2022-05-18 NOTE — CARE COORDINATION
ACM spoke with patient reminded him of his appointment with PM.  Checking on symptoms,  Per patient he is about the same. Is understanding that his parkinson's worsening. ARMANIM discussed the possibility of moving to assisted living and he said he would need to get some things in order if he did that someday. He did say that he would like to get his son to be his POA. AMBER explained that [facility]  to assist with this.

## 2022-05-19 ENCOUNTER — HOSPITAL ENCOUNTER (OUTPATIENT)
Dept: PAIN MANAGEMENT | Age: 82
Discharge: HOME | End: 2022-05-19
Payer: MEDICARE

## 2022-05-19 ENCOUNTER — CARE COORDINATION (OUTPATIENT)
Dept: CARE COORDINATION | Age: 82
End: 2022-05-19

## 2022-05-19 VITALS
DIASTOLIC BLOOD PRESSURE: 78 MMHG | WEIGHT: 173 LBS | TEMPERATURE: 97.3 F | HEART RATE: 78 BPM | RESPIRATION RATE: 12 BRPM | BODY MASS INDEX: 27.15 KG/M2 | OXYGEN SATURATION: 98 % | SYSTOLIC BLOOD PRESSURE: 130 MMHG | HEIGHT: 67 IN

## 2022-05-19 DIAGNOSIS — R69 MULTIPLE COMORBID CONDITIONS: Chronic | ICD-10-CM

## 2022-05-19 DIAGNOSIS — G89.29 CHRONIC PAIN OF LEFT KNEE: Primary | ICD-10-CM

## 2022-05-19 DIAGNOSIS — G89.29 CHRONIC PAIN OF MULTIPLE JOINTS: Chronic | ICD-10-CM

## 2022-05-19 DIAGNOSIS — M25.50 CHRONIC PAIN OF MULTIPLE JOINTS: Chronic | ICD-10-CM

## 2022-05-19 DIAGNOSIS — M25.562 CHRONIC PAIN OF LEFT KNEE: Primary | ICD-10-CM

## 2022-05-19 PROCEDURE — 99215 OFFICE O/P EST HI 40 MIN: CPT

## 2022-05-19 PROCEDURE — 99213 OFFICE O/P EST LOW 20 MIN: CPT | Performed by: ANESTHESIOLOGY

## 2022-05-19 ASSESSMENT — ENCOUNTER SYMPTOMS
RESPIRATORY NEGATIVE: 1
EYES NEGATIVE: 1
BACK PAIN: 1

## 2022-05-19 ASSESSMENT — PAIN SCALES - GENERAL: PAINLEVEL_OUTOF10: 8

## 2022-05-19 NOTE — CARE COORDINATION
Phoned patient today to remind him of his transportation to Pain Management, his cab is to arrive around 12 noon. Patient stated that he is hurting so badly today that he hopes that he can get ready for the appointment. HC reminded the patient that this appointment should be beneficial to him, to assist with his pain. Patient stated that he hoped so. HC also made mention of patient's interest in the ACP paperwork and process. HC informed the patient that [facility] will have documents sernt to him next week and will work with him through the process. Patient called back stating that his cab hadn't arrived, [facility] contacted [employer] and was told that the cab should arrive within the next five minutes.       Plan of Care  [facility] will follow up on patient's transportation and appointment  [facility] will have paperwork mailed out to patient for ACP

## 2022-05-19 NOTE — PROGRESS NOTES
The patient is a 79 y. o.  /  male. Chief Complaint   Patient presents with    Back Pain        Back Pain  Pertinent negatives include no fever. This is a pleasant 65-year-old gentleman with multiple major comorbidities  He is complaining of pain involving multiple body side including neck shoulder back and knee  His major pain issue today is left knee pain  Pain is chronic onset more than 1 year ago aggravates with activity  Stated that he had a knee injection a year ago that was helpful  He wants a repeat injection  Patient is on long-term antiplatelet therapy  Denies any fever or chills    Patient is returning to the pain clinic today to discuss getting another injection. Pain is located in the neck and back, going down the legs. Pain is a 8/10.     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 ([facility] [address] 3/18/2011    d/t being hurt on the job    CKD (chronic kidney disease) stage 2, GFR 60-89 ml/min     CKD (chronic kidney disease) stage 3, GFR 30-59 ml/min ([facility])     Diverticulosis of colon     GERD (gastroesophageal reflux disease)     Impaired renal function     MRSA (methicillin resistant staph aureus) culture positive 9/23/2015    leg    Osteoarthritis of knee     Left    Parkinson disease ([address]     Proteinuria     Skull fracture ([facility] [address] 3/18/2011    d/t 12-foot drop on the job    Temporary loss of eyesight     periodically, happened x7    [facility] 2012[facility]        Past Surgical History:   Procedure Laterality Date    COLONOSCOPY  11/02/2012    poor prep, tubular adenoma    COLONOSCOPY  09/19/2017    diverticulosis, multiple polyps as described, spot marking done, pathology-tubular adenoma x 4    [facility] COLSC FLX W/RMVL OF TUMOR POLYP LESION SNARE TQ N/A 9/19/2017    COLONOSCOPY POLYPECTOMY SNARE/COLD BIOPSY with tatooing and apc transverse colon performed by Curry Mills MD at [address] [facility] SURGERY Right     rotator cuff       Social History     Socioeconomic History    Marital status: Single     Spouse name: None    Number of children: None    Years of education: None    Highest education level: None   Occupational History    None   Tobacco Use    Smoking status: Former Smoker    Smokeless tobacco: Never Used   Vaping Use    Vaping Use: Never used   Substance and Sexual Activity    Alcohol use: No    Drug use: No    Sexual activity: Not Currently   Other Topics Concern    None   Social History Narrative    None     Social Determinants of Health     Financial Resource Strain: Low Risk     Difficulty of Paying Living Expenses: Not hard at all   Food Insecurity: No Food Insecurity    Worried About Running Out of Food in the Last Year: Never true    Destiny of Food in the Last Year: Never true   Transportation Needs: Unmet Transportation Needs    Lack of Transportation (Medical): No    Lack of Transportation (Non-Medical): Yes   Physical Activity: Inactive    Days of Exercise per Week: 0 days    Minutes of Exercise per Session: 0 min   Stress: Stress Concern Present    Feeling of Stress : To some extent   Social Connections: Socially Isolated    Frequency of Communication with Friends and Family: Three times a week    Frequency of Social Gatherings with Friends and Family:  Three times a week    Attends Hinduism Services: Never    Active Member of Clubs or Organizations: No    Attends Club or Organization Meetings: Never    Marital Status:    Intimate Partner Violence:     Fear of Current or Ex-Partner: Not on file    Emotionally Abused: Not on file    Physically Abused: Not on file    Sexually Abused: Not on file   Housing Stability: [address] Unable to Pay for Housing in the Last Year: No    Number of [place] in the Last Year: 1    Unstable Housing in the Last Year: No       Family History   Problem Relation Age of Onset    No Known Problems Mother    Harrison Loser No Known Problems Father        Allergies   Allergen Reactions    Dye [Iodides]      pain    Aspirin      Brain bleed         Vitals:    05/19/22 1349   BP: 130/78   Pulse: 78   Resp: 12   Temp: 97.3 °F (36.3 °C)   SpO2: 98%       Current Outpatient Medications   Medication Sig Dispense Refill    atorvastatin (LIPITOR) 40 MG tablet take 1 tablet by mouth once daily 90 tablet 3    azithromycin (ZITHROMAX) 250 MG tablet Take 2 tabs (500 mg) on Day 1, and take 1 tab (250 mg) on days 2 through 5. 1 packet 0    diclofenac sodium (VOLTAREN) 1 % GEL Apply 2 g topically 2 times daily as needed for Pain (joint pain) 350 g 0    entacapone (COMTAN) 200 MG tablet Take 1 tablet by mouth 4 times daily Take four times daily with sinemet 90 tablet 3    amitriptyline (ELAVIL) 10 MG tablet Take 1 tablet by mouth nightly 90 tablet 3    esomeprazole (NEXIUM) 20 MG delayed release capsule TAKE 1 CAPSULE BY MOUTH ONCE DAILY IN THE MORNING BEFORE BREAKFAST 90 capsule 1    rOPINIRole (REQUIP) 0.25 MG tablet TAKE ONE (1) TABLET BY MOUTH THREE (3) TIMES DAILY 90 tablet 5    carbidopa-levodopa (SINEMET)  MG per tablet TAKE 1 TABLET BY MOUTH 4 TIMES DAILY 120 tablet 9    clopidogrel (PLAVIX) 75 MG tablet TAKE 1 TABLET BY MOUTH ONCE DAILY  90 tablet 9    allopurinol (ZYLOPRIM) 100 MG tablet TAKE 1 TABLET BY MOUTH ONCE DAILY  90 tablet 9    magnesium oxide (MAG-OX) 400 (241.3 Mg) MG TABS tablet Take 1 tablet by mouth daily 30 tablet 0    nitroGLYCERIN (NITROSTAT) 0.4 MG SL tablet Place 1 tablet under the tongue every 5 minutes as needed for Chest pain If no relief of chest pain after 3rd dose, go to the ER or call 911 25 tablet 0    acetaminophen (TYLENOL 8 HOUR ARTHRITIS PAIN) 650 MG extended release tablet Take 1 tablet by mouth every 8 hours as needed for Pain 90 tablet 5    butalbital-acetaminophen-caffeine (FIORICET, ESGIC) -40 MG per tablet Take 1 tab prn only for severe headache.  Can repeat after 4 hrs if needed. Max 2 tabs/24 hrs. Max 4 tabs/week 28 tablet 3     No current facility-administered medications for this encounter. Review of Systems   Constitutional: Negative. Negative for fever. HENT: Negative. Eyes: Negative. Respiratory: Negative. Cardiovascular: Negative. Musculoskeletal: Positive for back pain and neck pain. Objective:  General Appearance: In no acute distress. Vital signs: (most recent): Blood pressure 130/78, pulse 78, temperature 97.3 °F (36.3 °C), resp. rate 12, height 5' 7\" (1.702 m), weight 173 lb (78.5 kg), SpO2 98 %. Vital signs are normal.  No fever. Output: Producing urine and producing stool. Lungs:  Normal effort. He is not in respiratory distress. Heart: Normal rate. Neurological: Patient is alert and oriented to person, place and time. Assessment & Plan   This is a pleasant 78-year-old gentleman with multiple major comorbidities  He is complaining of pain involving multiple body side including neck shoulder back and knee  His major pain issue today is left knee pain  Pain is chronic onset more than 1 year ago aggravates with activity  Stated that he had a knee injection a year ago that was helpful  He wants a repeat injection  Patient is on long-term antiplatelet therapy  Denies any fever or chills    1. Chronic pain of left knee    2. Chronic pain of multiple joints    3. Multiple comorbid conditions        Will schedule patient for left knee steroid injection  Advised patient to continue topical cream    Orders Placed This Encounter   Procedures    IR ARTHR/ASP/INJ MAJOR JT/BURSA W US      No orders of the defined types were placed in this encounter.            Electronically signed by Krysta Faye MD on 5/19/2022 at 2:07 PM

## 2022-05-19 NOTE — PROGRESS NOTES
The patient is a 79 y. o.  /  male. Chief Complaint   Patient presents with    Back Pain        HPI    Patient is returning to the pain clinic today to discuss getting another injection. Pain is located in the neck and back, going down the legs. Pain is a 8/10.     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 ([facility] 3/18/2011    d/t being hurt on the job    CKD (chronic kidney disease) stage 2, GFR 60-89 ml/min     CKD (chronic kidney disease) stage 3, GFR 30-59 ml/min (HCC)     Diverticulosis of colon     GERD (gastroesophageal reflux disease)     Impaired renal function     MRSA (methicillin resistant staph aureus) culture positive 9/23/2015    leg    Osteoarthritis of knee     Left    Parkinson disease ([facility]     Proteinuria     Skull fracture ([facility] 3/18/2011    d/t 12-foot drop on the job    Temporary loss of eyesight     periodically, happened x7    Tubular adenoma of colon 2012, 2017    mulitple        Past Surgical History:   Procedure Laterality Date    COLONOSCOPY  11/02/2012    poor prep, tubular adenoma    COLONOSCOPY  09/19/2017    diverticulosis, multiple polyps as described, spot marking done, pathology-tubular adenoma x 4    LA COLSC FLX W/RMVL OF TUMOR POLYP LESION SNARE TQ N/A 9/19/2017    COLONOSCOPY POLYPECTOMY SNARE/COLD BIOPSY with tatooing and apc transverse colon performed by Ivelisse Vivas MD at [address] [place] [address] Right     rotator cuff       Social History     Socioeconomic History    Marital status: Single     Spouse name: None    Number of children: None    Years of education: None    Highest education level: None   Occupational History    None   Tobacco Use    Smoking status: Former Smoker    Smokeless tobacco: Never Used   Vaping Use    Vaping Use: Never used   Substance and Sexual Activity    Alcohol use: No    Drug use: No    Sexual activity: Not Currently   Other Topics Concern    None   Social History Narrative    None     Social Determinants of Health     Financial Resource Strain: Low Risk     Difficulty of Paying Living Expenses: Not hard at all   Food Insecurity: No Food Insecurity    Worried About Running Out of Food in the Last Year: Never true    Destiny of Food in the Last Year: Never true   Transportation Needs: Unmet Transportation Needs    Lack of Transportation (Medical): No    Lack of Transportation (Non-Medical): Yes   Physical Activity: Inactive    Days of Exercise per Week: 0 days    Minutes of Exercise per Session: 0 min   Stress: Stress Concern Present    Feeling of Stress : To some extent   Social Connections: Socially Isolated    Frequency of Communication with Friends and Family: Three times a week    Frequency of Social Gatherings with Friends and Family: Three times a week    Attends Restorationism Services: Never    Active Member of Clubs or Organizations: No    Attends Club or Organization Meetings: Never    Marital Status:    Intimate Partner Violence:     Fear of Current or Ex-Partner: Not on file    Emotionally Abused: Not on file    Physically Abused: Not on file    Sexually Abused: Not on file   Housing Stability: [address] Unable to Pay for Housing in the Last Year: No    Number of [place] in the Last Year: 1    Unstable Housing in the Last Year: No       Family History   Problem Relation Age of Onset    No Known Problems Mother     No Known Problems Father        Allergies   Allergen Reactions    Dye [Iodides]      pain    Aspirin      Brain bleed         There were no vitals filed for this visit.     Current Outpatient Medications   Medication Sig Dispense Refill    atorvastatin (LIPITOR) 40 MG tablet take 1 tablet by mouth once daily 90 tablet 3    azithromycin (ZITHROMAX) 250 MG tablet Take 2 tabs (500 mg) on Day 1, and take 1 tab (250 mg) on days 2 through 5. 1 packet 0    diclofenac sodium (VOLTAREN) 1 % GEL Apply 2 g topically 2 times daily as needed for Pain (joint pain) 350 g 0    entacapone (COMTAN) 200 MG tablet Take 1 tablet by mouth 4 times daily Take four times daily with sinemet 90 tablet 3    amitriptyline (ELAVIL) 10 MG tablet Take 1 tablet by mouth nightly 90 tablet 3    esomeprazole (NEXIUM) 20 MG delayed release capsule TAKE 1 CAPSULE BY MOUTH ONCE DAILY IN THE MORNING BEFORE BREAKFAST 90 capsule 1    rOPINIRole (REQUIP) 0.25 MG tablet TAKE ONE (1) TABLET BY MOUTH THREE (3) TIMES DAILY 90 tablet 5    carbidopa-levodopa (SINEMET)  MG per tablet TAKE 1 TABLET BY MOUTH 4 TIMES DAILY 120 tablet 9    clopidogrel (PLAVIX) 75 MG tablet TAKE 1 TABLET BY MOUTH ONCE DAILY  90 tablet 9    allopurinol (ZYLOPRIM) 100 MG tablet TAKE 1 TABLET BY MOUTH ONCE DAILY  90 tablet 9    magnesium oxide (MAG-OX) 400 (241.3 Mg) MG TABS tablet Take 1 tablet by mouth daily 30 tablet 0    nitroGLYCERIN (NITROSTAT) 0.4 MG SL tablet Place 1 tablet under the tongue every 5 minutes as needed for Chest pain If no relief of chest pain after 3rd dose, go to the ER or call 911 25 tablet 0    acetaminophen (TYLENOL 8 HOUR ARTHRITIS PAIN) 650 MG extended release tablet Take 1 tablet by mouth every 8 hours as needed for Pain 90 tablet 5    butalbital-acetaminophen-caffeine (FIORICET, ESGIC) -40 MG per tablet Take 1 tab prn only for severe headache. Can repeat after 4 hrs if needed. Max 2 tabs/24 hrs. Max 4 tabs/week 28 tablet 3     No current facility-administered medications for this encounter. Review of Systems   Constitutional: Negative. HENT: Negative. Eyes: Negative. Respiratory: Negative. Cardiovascular: Negative. Musculoskeletal: Positive for back pain and neck pain. Objective  Assessment & Plan  No diagnosis found. No orders of the defined types were placed in this encounter. No orders of the defined types were placed in this encounter.            Electronically signed by Sandra Fontaine Cassius Piper on 5/19/2022 at 1:44 PM

## 2022-05-19 NOTE — CARE COORDINATION
Patient called stating that he is lost at [facility] and does not know where pain management is. He could not describe which building he was in so M asked him to go outside and tell her what the building said. He was able to get an address off the building and find a bench to sit on. [facility] security and they found him and will get him to his appointment. Delfin HERZOG Is calling pain management to ask them to be sure to call for his transportation when he is done so that they can provide instructions on where to pick him up.

## 2022-05-19 NOTE — CARE COORDINATION
Called the patient to get his location at the Pain Clinic, [facility]. asked the patient to allow someone from the office speak to the [facility].. Patient stated that he had been placed in an exam room, and in looking out, there was no one around. HC has been trying to contact the Pain Management Office and can't reach anyone. Reached a representative who stated that they will assist the patient with getting his return cab and tell them precisely where to pick him up. Representative will assist the patient in contacting patient with his return ride  and location.     Plan of Care  [facility]. will follow up with patient regarding his future transportation needs

## 2022-05-23 ENCOUNTER — CARE COORDINATION (OUTPATIENT)
Dept: CARE COORDINATION | Age: 82
End: 2022-05-23

## 2022-05-23 NOTE — CARE COORDINATION
HC attempted to contact this patient today. There was no answer, HC left a message and the contact information for a return call. Plan of Care  [facility]. will reach out to patient if no response within 3 days.

## 2022-05-24 NOTE — TELEPHONE ENCOUNTER
E-scribe requesting refill for entacapone. Please review and e-scribe if applicable.      Last Visit Date: 4.5.22    Next Visit Date:  Future Appointments   Date Time Provider Shireen Araceli   6/6/2022  1:00 PM Adrien Harvey MD [address]   7/7/2022  1:00 PM Jimmy Yang APRN - CNP [address]   7/19/2022 12:30 PM Wil Mckinley MD [facility]

## 2022-05-25 ENCOUNTER — CARE COORDINATION (OUTPATIENT)
Dept: CARE COORDINATION | Age: 82
End: 2022-05-25

## 2022-05-25 NOTE — CARE COORDINATION
Attempting to reach patient for a follow up care coordination call regarding any needs, questions or concerns.   Tere Berkowitz [facility] [phone]  No answer, no VM available

## 2022-05-26 ENCOUNTER — CARE COORDINATION (OUTPATIENT)
Dept: CARE COORDINATION | Age: 82
End: 2022-05-26

## 2022-05-26 RX ORDER — ENTACAPONE 200 MG/1
TABLET ORAL
Qty: 90 TABLET | Refills: 3 | Status: SHIPPED | OUTPATIENT
Start: 2022-05-26 | End: 2022-06-21 | Stop reason: SDUPTHER

## 2022-05-26 NOTE — CARE COORDINATION
[facility] phoned patient today to check in and to inform him that she will assist him with his transportation for the Pain Management Clinic on Monday, 06/06/22 @ . to [facility] on the [facility] side of the building. Patient informed the [facility] that he had received a letter stating that he has to schedule an appointment with a cardiologist.  Patient is somewhat bothered by having to set another appointment, he states that it's too much.  informed the patient that she will have Rizwan Sandoval/AMBER to contact him when she returns on Tuesday, 05/31/22 to assist him with navigating through the health concerns. Patient agreed to the plan.     Plan of Care  [facility] will arrange patient's transportation for 06/06/22 to Pain Management

## 2022-05-31 ENCOUNTER — CARE COORDINATION (OUTPATIENT)
Dept: CARE COORDINATION | Age: 82
End: 2022-05-31

## 2022-05-31 NOTE — CARE COORDINATION
HC phoned the patient, he answered informing the [facility]. of how much pain he continues to experience. HC attempted to encourage the patient. HC informed the patient that she would send a message to Brielle Sandoval/AMBER and have her to contact him. Patient agreed to the plan. HC shared with the patient his upcoming appointment details for Monday, 06/06/22 for Pain Management @ [address] [place] [address]. HC provided patient with the appointment date, time and time of . HC also shared information with him regarding where the cab is to take him on the [address], and how he is to get to the Pain Management Area. Patient stated that he wrote the appointment date and pickup time down. Plan of Care  [facility]. send an email to Michele Samayoa regarding patients need to speak with her.

## 2022-05-31 NOTE — CARE COORDINATION
HC arranged patients transportation for Monday, 06/06/22 to [facility] for 1p. HC spoke with Pain Management's office for specific details for [employer]. Cab is to take the patient to the first parking lot on [address] with the [employer]. Patient is to be dropped off there. HC arranged for a 11:30a , not sure of how the cabs will be running that day.

## 2022-06-01 ENCOUNTER — CARE COORDINATION (OUTPATIENT)
Dept: CARE COORDINATION | Age: 82
End: 2022-06-01

## 2022-06-02 ENCOUNTER — CARE COORDINATION (OUTPATIENT)
Dept: CARE COORDINATION | Age: 82
End: 2022-06-02

## 2022-06-02 NOTE — CARE COORDINATION
HC attempted to contact this patient to share information for his upcoming appointment on 06/06/22 for Pain Management. There was no answer, and patient's voicemail was full. Plan of Care  [facility]. will attempt to reach out to patient to remind of his appointment on 06/06/22. Patient called back and stated that he's still feeling bad. He states   that he will try to make the appointment 06/06/22. HC will check in with patient on 06/06/22 to inquire of how he is feeling.

## 2022-06-06 ENCOUNTER — HOSPITAL ENCOUNTER (OUTPATIENT)
Dept: PAIN MANAGEMENT | Age: 82
Discharge: HOME | End: 2022-06-06
Payer: MEDICARE

## 2022-06-06 ENCOUNTER — CARE COORDINATION (OUTPATIENT)
Dept: CARE COORDINATION | Age: 82
End: 2022-06-06

## 2022-06-06 ENCOUNTER — HOSPITAL ENCOUNTER (OUTPATIENT)
Dept: GENERAL RADIOLOGY | Age: 82
Discharge: HOME | End: 2022-06-08
Payer: MEDICARE

## 2022-06-06 VITALS
HEART RATE: 69 BPM | RESPIRATION RATE: 15 BRPM | BODY MASS INDEX: 26.68 KG/M2 | OXYGEN SATURATION: 98 % | SYSTOLIC BLOOD PRESSURE: 146 MMHG | TEMPERATURE: 98.4 F | HEIGHT: 67 IN | WEIGHT: 170 LBS | DIASTOLIC BLOOD PRESSURE: 74 MMHG

## 2022-06-06 DIAGNOSIS — R52 PAIN MANAGEMENT: ICD-10-CM

## 2022-06-06 PROCEDURE — 20610 DRAIN/INJ JOINT/BURSA W/O US: CPT | Performed by: ANESTHESIOLOGY

## 2022-06-06 PROCEDURE — 77002 NEEDLE LOCALIZATION BY XRAY: CPT

## 2022-06-06 PROCEDURE — 6360000002 HC RX W HCPCS: Performed by: ANESTHESIOLOGY

## 2022-06-06 PROCEDURE — 3209999900 FLUORO FOR SURGICAL PROCEDURES

## 2022-06-06 PROCEDURE — 20610 DRAIN/INJ JOINT/BURSA W/O US: CPT

## 2022-06-06 PROCEDURE — 77002 NEEDLE LOCALIZATION BY XRAY: CPT | Performed by: ANESTHESIOLOGY

## 2022-06-06 PROCEDURE — 2500000003 HC RX 250 WO HCPCS: Performed by: ANESTHESIOLOGY

## 2022-06-06 RX ORDER — DEXAMETHASONE SODIUM PHOSPHATE 10 MG/ML
INJECTION, SOLUTION INTRAMUSCULAR; INTRAVENOUS
Status: COMPLETED | OUTPATIENT
Start: 2022-06-06 | End: 2022-06-06

## 2022-06-06 RX ORDER — BUPIVACAINE HYDROCHLORIDE 5 MG/ML
INJECTION, SOLUTION EPIDURAL; INTRACAUDAL
Status: COMPLETED | OUTPATIENT
Start: 2022-06-06 | End: 2022-06-06

## 2022-06-06 RX ORDER — LIDOCAINE HYDROCHLORIDE 10 MG/ML
INJECTION, SOLUTION EPIDURAL; INFILTRATION; INTRACAUDAL; PERINEURAL
Status: COMPLETED | OUTPATIENT
Start: 2022-06-06 | End: 2022-06-06

## 2022-06-06 RX ADMIN — LIDOCAINE HYDROCHLORIDE 5 ML: 10 INJECTION, SOLUTION EPIDURAL; INFILTRATION; INTRACAUDAL; PERINEURAL at 14:28

## 2022-06-06 RX ADMIN — DEXAMETHASONE SODIUM PHOSPHATE 10 MG: 10 INJECTION, SOLUTION INTRAMUSCULAR; INTRAVENOUS at 14:29

## 2022-06-06 RX ADMIN — BUPIVACAINE HYDROCHLORIDE 3 ML: 5 INJECTION, SOLUTION EPIDURAL; INTRACAUDAL; PERINEURAL at 14:29

## 2022-06-06 ASSESSMENT — PAIN - FUNCTIONAL ASSESSMENT
PAIN_FUNCTIONAL_ASSESSMENT: PREVENTS OR INTERFERES WITH MANY ACTIVE NOT PASSIVE ACTIVITIES
PAIN_FUNCTIONAL_ASSESSMENT: 0-10
PAIN_FUNCTIONAL_ASSESSMENT: 0-10

## 2022-06-06 ASSESSMENT — PAIN DESCRIPTION - DESCRIPTORS: DESCRIPTORS: PATIENT UNABLE TO DESCRIBE

## 2022-06-06 NOTE — OP NOTE
Preoperative Diagnosis: Osteoarthritis of the Left knee. Postoperative Diagnosis: Osteoarthritis of the Left knee. Procedure Performed:  Injection of Left knee with floro Guidance. Indication for the Procedure: The patient has Left knee pain not responding to conservative treatment diagnosed with Knee OA  The procedure and the risks were discussed with the patient and an informed consent was obtained. Procedure: The patient is placed in supine/sitting position. Skin over the Left knee was prepped with Betadine and draped in sterile manner. Using [address] with a high frequency linear probe area was scanned to identify the supra patellar recess. Then skin and deep tissues lateral to the patellar tendon just above the tibial plateau were infiltrated with 2  ml of 1% lidocaine. The #22-gauge  spinal needle was introduced through the skin wheal. Then the needle was advanced with fluroscopy guidance guidance into the supra patellar recess. Finally 3 ml of treatment solution containing 2 mL of 0.5% Bupivacaine with 1 mL of dexamethasone 10 were injected into the joint. The needle is removed and a Band-Aid was placed over the needle insertion site. Patient tolerated the procedure well and the vital signs remained stable. Patient will be seen in the pain clinic for follow up and further planning.     Electronically signed by Ana M Jackson MD on 6/6/2022 at 2:57 PM

## 2022-06-06 NOTE — H&P
UPDATE:  Office visit pain clinic in [facility] with all required elements of H&P dated 05/19/2022  Patient seen preop, chart reviewed,   No changes in medical history and health assessment since last evaluation. PE:  AAO x 3, in NAD, VSS,. Resp: breathing on RA, no respiratory distress  Cardiac: rate normal    Risk / Benefits explained to patient, patient agree to proceed with plan.   ASA 3    MP 3

## 2022-06-06 NOTE — CARE COORDINATION
HC phoned the patient this morning to remind of his appointment today at the [address]. HC informed the patient that he is to be picked up at 11:30a, and his appointment is at 1p. HC informed the patient of where he is to be dropped off, and how to get to the pain management clinic. Patient stated an understanding. HC encouraged the patient to call if he has any difficulties.     Plan of Care  [facility]. will continue to assist the patient with his social needs

## 2022-06-07 ENCOUNTER — HOSPITAL ENCOUNTER (INPATIENT)
Age: 82
LOS: 9 days | Discharge: NURSING, REHAB OR LONG-TERM CARE | DRG: 287 | End: 2022-06-16
Attending: EMERGENCY MEDICINE
Payer: MEDICARE

## 2022-06-07 ENCOUNTER — APPOINTMENT (OUTPATIENT)
Dept: GENERAL RADIOLOGY | Age: 82
DRG: 287 | End: 2022-06-07
Payer: MEDICARE

## 2022-06-07 DIAGNOSIS — I21.4 NSTEMI (NON-ST ELEVATED MYOCARDIAL INFARCTION) (HCC): Primary | ICD-10-CM

## 2022-06-07 PROBLEM — M10.9 GOUT: Status: ACTIVE | Noted: 2022-06-07

## 2022-06-07 PROBLEM — E87.20 LACTIC ACIDOSIS: Status: ACTIVE | Noted: 2022-06-07

## 2022-06-07 LAB
ABSOLUTE EOS #: <0.03 K/UL (ref 0–0.44)
ABSOLUTE IMMATURE GRANULOCYTE: 0.1 K/UL (ref 0–0.3)
ABSOLUTE LYMPH #: 1 K/UL (ref 1.1–3.7)
ABSOLUTE MONO #: 0.69 K/UL (ref 0.1–1.2)
ALBUMIN SERPL-MCNC: 4.3 G/DL (ref 3.5–5.2)
ALBUMIN/GLOBULIN RATIO: 1.4 (ref 1–2.5)
ALP BLD-CCNC: 110 U/L (ref 40–129)
ALT SERPL-CCNC: 23 U/L (ref 5–41)
ANION GAP SERPL CALCULATED.3IONS-SCNC: 14 MMOL/L (ref 9–17)
AST SERPL-CCNC: 30 U/L
BASOPHILS # BLD: 0 % (ref 0–2)
BASOPHILS ABSOLUTE: <0.03 K/UL (ref 0–0.2)
BILIRUB SERPL-MCNC: 0.33 MG/DL (ref 0.3–1.2)
BILIRUBIN DIRECT: 0.08 MG/DL
BILIRUBIN URINE: NEGATIVE
BILIRUBIN, INDIRECT: 0.25 MG/DL (ref 0–1)
BUN BLDV-MCNC: 17 MG/DL (ref 8–23)
CALCIUM SERPL-MCNC: 9.5 MG/DL (ref 8.6–10.4)
CHLORIDE BLD-SCNC: 101 MMOL/L (ref 98–107)
CHOLESTEROL/HDL RATIO: 2.2
CHOLESTEROL: 114 MG/DL
CO2: 18 MMOL/L (ref 20–31)
COLOR: YELLOW
COMMENT UA: NORMAL
CREAT SERPL-MCNC: 1.32 MG/DL (ref 0.7–1.2)
D-DIMER QUANTITATIVE: 0.39 MG/L FEU
EOSINOPHILS RELATIVE PERCENT: 0 % (ref 1–4)
GFR AFRICAN AMERICAN: >60 ML/MIN
GFR NON-AFRICAN AMERICAN: 52 ML/MIN
GFR SERPL CREATININE-BSD FRML MDRD: ABNORMAL ML/MIN/{1.73_M2}
GLUCOSE BLD-MCNC: 224 MG/DL (ref 70–99)
GLUCOSE URINE: NEGATIVE
HCT VFR BLD CALC: 41.6 % (ref 40.7–50.3)
HDLC SERPL-MCNC: 51 MG/DL
HEMOGLOBIN: 14.1 G/DL (ref 13–17)
IMMATURE GRANULOCYTES: 1 %
INR BLD: 1
KETONES, URINE: NEGATIVE
LACTIC ACID, SEPSIS WHOLE BLOOD: 2.1 MMOL/L (ref 0.5–1.9)
LACTIC ACID, SEPSIS WHOLE BLOOD: 3.6 MMOL/L (ref 0.5–1.9)
LDL CHOLESTEROL: 51 MG/DL (ref 0–130)
LEUKOCYTE ESTERASE, URINE: NEGATIVE
LYMPHOCYTES # BLD: 6 % (ref 24–43)
MCH RBC QN AUTO: 31.8 PG (ref 25.2–33.5)
MCHC RBC AUTO-ENTMCNC: 33.9 G/DL (ref 28.4–34.8)
MCV RBC AUTO: 93.9 FL (ref 82.6–102.9)
MONOCYTES # BLD: 4 % (ref 3–12)
NITRITE, URINE: NEGATIVE
NRBC AUTOMATED: 0 PER 100 WBC
PARTIAL THROMBOPLASTIN TIME: 24.2 SEC (ref 20.5–30.5)
PARTIAL THROMBOPLASTIN TIME: 48.6 SEC (ref 20.5–30.5)
PDW BLD-RTO: 14.2 % (ref 11.8–14.4)
PH UA: 6 (ref 5–8)
PLATELET # BLD: 233 K/UL (ref 138–453)
PMV BLD AUTO: 8.9 FL (ref 8.1–13.5)
POTASSIUM SERPL-SCNC: 4.2 MMOL/L (ref 3.7–5.3)
PRO-BNP: 664 PG/ML
PROTEIN UA: NEGATIVE
PROTHROMBIN TIME: 10.5 SEC (ref 9.1–12.3)
RBC # BLD: 4.43 M/UL (ref 4.21–5.77)
SEG NEUTROPHILS: 89 % (ref 36–65)
SEGMENTED NEUTROPHILS ABSOLUTE COUNT: 15.94 K/UL (ref 1.5–8.1)
SODIUM BLD-SCNC: 133 MMOL/L (ref 135–144)
SPECIFIC GRAVITY UA: 1.01 (ref 1–1.03)
TOTAL PROTEIN: 7.4 G/DL (ref 6.4–8.3)
TRIGL SERPL-MCNC: 58 MG/DL
TROPONIN, HIGH SENSITIVITY: 41 NG/L (ref 0–22)
TROPONIN, HIGH SENSITIVITY: 44 NG/L (ref 0–22)
TROPONIN, HIGH SENSITIVITY: 51 NG/L (ref 0–22)
TROPONIN, HIGH SENSITIVITY: 54 NG/L (ref 0–22)
TURBIDITY: CLEAR
URINE HGB: NEGATIVE
UROBILINOGEN, URINE: NORMAL
WBC # BLD: 17.8 K/UL (ref 3.5–11.3)

## 2022-06-07 PROCEDURE — 6370000000 HC RX 637 (ALT 250 FOR IP): Performed by: NURSE PRACTITIONER

## 2022-06-07 PROCEDURE — 87086 URINE CULTURE/COLONY COUNT: CPT

## 2022-06-07 PROCEDURE — 1200000000 HC SEMI PRIVATE

## 2022-06-07 PROCEDURE — 93005 ELECTROCARDIOGRAM TRACING: CPT | Performed by: EMERGENCY MEDICINE

## 2022-06-07 PROCEDURE — 80048 BASIC METABOLIC PNL TOTAL CA: CPT

## 2022-06-07 PROCEDURE — 83605 ASSAY OF LACTIC ACID: CPT

## 2022-06-07 PROCEDURE — 81003 URINALYSIS AUTO W/O SCOPE: CPT

## 2022-06-07 PROCEDURE — 85379 FIBRIN DEGRADATION QUANT: CPT

## 2022-06-07 PROCEDURE — 6370000000 HC RX 637 (ALT 250 FOR IP): Performed by: EMERGENCY MEDICINE

## 2022-06-07 PROCEDURE — 96368 THER/DIAG CONCURRENT INF: CPT

## 2022-06-07 PROCEDURE — 96375 TX/PRO/DX INJ NEW DRUG ADDON: CPT

## 2022-06-07 PROCEDURE — 99285 EMERGENCY DEPT VISIT HI MDM: CPT

## 2022-06-07 PROCEDURE — 80061 LIPID PANEL: CPT

## 2022-06-07 PROCEDURE — 80076 HEPATIC FUNCTION PANEL: CPT

## 2022-06-07 PROCEDURE — 71045 X-RAY EXAM CHEST 1 VIEW: CPT

## 2022-06-07 PROCEDURE — 85025 COMPLETE CBC W/AUTO DIFF WBC: CPT

## 2022-06-07 PROCEDURE — 87040 BLOOD CULTURE FOR BACTERIA: CPT

## 2022-06-07 PROCEDURE — 6360000002 HC RX W HCPCS: Performed by: EMERGENCY MEDICINE

## 2022-06-07 PROCEDURE — 2580000003 HC RX 258: Performed by: EMERGENCY MEDICINE

## 2022-06-07 PROCEDURE — 84484 ASSAY OF TROPONIN QUANT: CPT

## 2022-06-07 PROCEDURE — 85610 PROTHROMBIN TIME: CPT

## 2022-06-07 PROCEDURE — 2580000003 HC RX 258: Performed by: NURSE PRACTITIONER

## 2022-06-07 PROCEDURE — 96365 THER/PROPH/DIAG IV INF INIT: CPT

## 2022-06-07 PROCEDURE — 83880 ASSAY OF NATRIURETIC PEPTIDE: CPT

## 2022-06-07 PROCEDURE — 93005 ELECTROCARDIOGRAM TRACING: CPT | Performed by: NURSE PRACTITIONER

## 2022-06-07 PROCEDURE — 99222 1ST HOSP IP/OBS MODERATE 55: CPT | Performed by: NURSE PRACTITIONER

## 2022-06-07 PROCEDURE — 85730 THROMBOPLASTIN TIME PARTIAL: CPT

## 2022-06-07 RX ORDER — ASPIRIN 81 MG/1
324 TABLET, CHEWABLE ORAL ONCE
Status: COMPLETED | OUTPATIENT
Start: 2022-06-07 | End: 2022-06-07

## 2022-06-07 RX ORDER — ENTACAPONE 200 MG/1
200 TABLET ORAL 4 TIMES DAILY
Status: DISCONTINUED | OUTPATIENT
Start: 2022-06-07 | End: 2022-06-16 | Stop reason: HOSPADM

## 2022-06-07 RX ORDER — ALLOPURINOL 100 MG/1
100 TABLET ORAL DAILY
Status: DISCONTINUED | OUTPATIENT
Start: 2022-06-07 | End: 2022-06-16 | Stop reason: HOSPADM

## 2022-06-07 RX ORDER — HEPARIN SODIUM 1000 [USP'U]/ML
30 INJECTION, SOLUTION INTRAVENOUS; SUBCUTANEOUS PRN
Status: DISCONTINUED | OUTPATIENT
Start: 2022-06-07 | End: 2022-06-07

## 2022-06-07 RX ORDER — HEPARIN SODIUM AND DEXTROSE 10000; 5 [USP'U]/100ML; G/100ML
5-30 INJECTION INTRAVENOUS CONTINUOUS
Status: DISCONTINUED | OUTPATIENT
Start: 2022-06-07 | End: 2022-06-08

## 2022-06-07 RX ORDER — HEPARIN SODIUM 1000 [USP'U]/ML
2000 INJECTION, SOLUTION INTRAVENOUS; SUBCUTANEOUS PRN
Status: DISCONTINUED | OUTPATIENT
Start: 2022-06-07 | End: 2022-06-08

## 2022-06-07 RX ORDER — ATORVASTATIN CALCIUM 80 MG/1
40 TABLET, FILM COATED ORAL NIGHTLY
Status: DISCONTINUED | OUTPATIENT
Start: 2022-06-07 | End: 2022-06-16 | Stop reason: HOSPADM

## 2022-06-07 RX ORDER — HEPARIN SODIUM 1000 [USP'U]/ML
4000 INJECTION, SOLUTION INTRAVENOUS; SUBCUTANEOUS ONCE
Status: DISCONTINUED | OUTPATIENT
Start: 2022-06-07 | End: 2022-06-07

## 2022-06-07 RX ORDER — CLOPIDOGREL BISULFATE 75 MG/1
75 TABLET ORAL DAILY
Status: DISCONTINUED | OUTPATIENT
Start: 2022-06-07 | End: 2022-06-16 | Stop reason: HOSPADM

## 2022-06-07 RX ORDER — LANOLIN ALCOHOL/MO/W.PET/CERES
400 CREAM (GRAM) TOPICAL DAILY
Status: DISCONTINUED | OUTPATIENT
Start: 2022-06-07 | End: 2022-06-16 | Stop reason: HOSPADM

## 2022-06-07 RX ORDER — SODIUM CHLORIDE 0.9 % (FLUSH) 0.9 %
10 SYRINGE (ML) INJECTION PRN
Status: DISCONTINUED | OUTPATIENT
Start: 2022-06-07 | End: 2022-06-16 | Stop reason: HOSPADM

## 2022-06-07 RX ORDER — POTASSIUM CHLORIDE 20 MEQ/1
40 TABLET, EXTENDED RELEASE ORAL PRN
Status: DISCONTINUED | OUTPATIENT
Start: 2022-06-07 | End: 2022-06-16 | Stop reason: HOSPADM

## 2022-06-07 RX ORDER — HEPARIN SODIUM 1000 [USP'U]/ML
60 INJECTION, SOLUTION INTRAVENOUS; SUBCUTANEOUS PRN
Status: DISCONTINUED | OUTPATIENT
Start: 2022-06-07 | End: 2022-06-07

## 2022-06-07 RX ORDER — ACETAMINOPHEN 650 MG/1
650 SUPPOSITORY RECTAL EVERY 6 HOURS PRN
Status: DISCONTINUED | OUTPATIENT
Start: 2022-06-07 | End: 2022-06-16 | Stop reason: HOSPADM

## 2022-06-07 RX ORDER — AMITRIPTYLINE HYDROCHLORIDE 10 MG/1
10 TABLET, FILM COATED ORAL NIGHTLY
Status: DISCONTINUED | OUTPATIENT
Start: 2022-06-07 | End: 2022-06-10

## 2022-06-07 RX ORDER — SODIUM CHLORIDE 9 MG/ML
INJECTION, SOLUTION INTRAVENOUS PRN
Status: DISCONTINUED | OUTPATIENT
Start: 2022-06-07 | End: 2022-06-08 | Stop reason: SDUPTHER

## 2022-06-07 RX ORDER — HEPARIN SODIUM 1000 [USP'U]/ML
4000 INJECTION, SOLUTION INTRAVENOUS; SUBCUTANEOUS ONCE
Status: COMPLETED | OUTPATIENT
Start: 2022-06-07 | End: 2022-06-07

## 2022-06-07 RX ORDER — ACETAMINOPHEN 325 MG/1
650 TABLET ORAL EVERY 6 HOURS PRN
Status: DISCONTINUED | OUTPATIENT
Start: 2022-06-07 | End: 2022-06-16 | Stop reason: HOSPADM

## 2022-06-07 RX ORDER — SODIUM CHLORIDE 0.9 % (FLUSH) 0.9 %
5-40 SYRINGE (ML) INJECTION EVERY 12 HOURS SCHEDULED
Status: DISCONTINUED | OUTPATIENT
Start: 2022-06-07 | End: 2022-06-08 | Stop reason: SDUPTHER

## 2022-06-07 RX ORDER — HEPARIN SODIUM 1000 [USP'U]/ML
4000 INJECTION, SOLUTION INTRAVENOUS; SUBCUTANEOUS PRN
Status: DISCONTINUED | OUTPATIENT
Start: 2022-06-07 | End: 2022-06-08

## 2022-06-07 RX ORDER — ONDANSETRON 4 MG/1
4 TABLET, ORALLY DISINTEGRATING ORAL EVERY 8 HOURS PRN
Status: DISCONTINUED | OUTPATIENT
Start: 2022-06-07 | End: 2022-06-16 | Stop reason: HOSPADM

## 2022-06-07 RX ORDER — ONDANSETRON 2 MG/ML
4 INJECTION INTRAMUSCULAR; INTRAVENOUS EVERY 6 HOURS PRN
Status: DISCONTINUED | OUTPATIENT
Start: 2022-06-07 | End: 2022-06-16 | Stop reason: HOSPADM

## 2022-06-07 RX ORDER — NITROGLYCERIN 0.4 MG/1
0.4 TABLET SUBLINGUAL ONCE
Status: COMPLETED | OUTPATIENT
Start: 2022-06-07 | End: 2022-06-07

## 2022-06-07 RX ORDER — POTASSIUM CHLORIDE 7.45 MG/ML
10 INJECTION INTRAVENOUS PRN
Status: DISCONTINUED | OUTPATIENT
Start: 2022-06-07 | End: 2022-06-16 | Stop reason: HOSPADM

## 2022-06-07 RX ORDER — NITROGLYCERIN 0.4 MG/1
0.4 TABLET SUBLINGUAL EVERY 5 MIN PRN
Status: DISCONTINUED | OUTPATIENT
Start: 2022-06-07 | End: 2022-06-16 | Stop reason: HOSPADM

## 2022-06-07 RX ORDER — HEPARIN SODIUM AND DEXTROSE 10000; 5 [USP'U]/100ML; G/100ML
5-30 INJECTION INTRAVENOUS CONTINUOUS
Status: DISCONTINUED | OUTPATIENT
Start: 2022-06-07 | End: 2022-06-07

## 2022-06-07 RX ORDER — PANTOPRAZOLE SODIUM 40 MG/1
40 TABLET, DELAYED RELEASE ORAL
Status: DISCONTINUED | OUTPATIENT
Start: 2022-06-08 | End: 2022-06-16 | Stop reason: HOSPADM

## 2022-06-07 RX ORDER — MAGNESIUM SULFATE 1 G/100ML
1000 INJECTION INTRAVENOUS PRN
Status: DISCONTINUED | OUTPATIENT
Start: 2022-06-07 | End: 2022-06-16 | Stop reason: HOSPADM

## 2022-06-07 RX ORDER — ROPINIROLE 0.25 MG/1
0.25 TABLET, FILM COATED ORAL 3 TIMES DAILY
Status: DISCONTINUED | OUTPATIENT
Start: 2022-06-07 | End: 2022-06-16 | Stop reason: HOSPADM

## 2022-06-07 RX ADMIN — CARBIDOPA AND LEVODOPA 1 TABLET: 25; 100 TABLET ORAL at 21:43

## 2022-06-07 RX ADMIN — HEPARIN SODIUM 2000 UNITS: 1000 INJECTION INTRAVENOUS; SUBCUTANEOUS at 18:18

## 2022-06-07 RX ADMIN — METOPROLOL TARTRATE 12.5 MG: 25 TABLET ORAL at 16:44

## 2022-06-07 RX ADMIN — ENTACAPONE 200 MG: 200 TABLET, FILM COATED ORAL at 16:37

## 2022-06-07 RX ADMIN — ATORVASTATIN CALCIUM 40 MG: 80 TABLET, FILM COATED ORAL at 21:43

## 2022-06-07 RX ADMIN — ENTACAPONE 200 MG: 200 TABLET, FILM COATED ORAL at 21:43

## 2022-06-07 RX ADMIN — ROPINIROLE HYDROCHLORIDE 0.25 MG: 0.25 TABLET, FILM COATED ORAL at 16:37

## 2022-06-07 RX ADMIN — CEFTRIAXONE SODIUM 1000 MG: 1 INJECTION, POWDER, FOR SOLUTION INTRAMUSCULAR; INTRAVENOUS at 10:59

## 2022-06-07 RX ADMIN — METOPROLOL TARTRATE 12.5 MG: 25 TABLET ORAL at 21:42

## 2022-06-07 RX ADMIN — CARBIDOPA AND LEVODOPA 1 TABLET: 25; 100 TABLET ORAL at 16:37

## 2022-06-07 RX ADMIN — MAGNESIUM GLUCONATE 500 MG ORAL TABLET 400 MG: 500 TABLET ORAL at 16:37

## 2022-06-07 RX ADMIN — NITROGLYCERIN 0.4 MG: 0.4 TABLET SUBLINGUAL at 09:45

## 2022-06-07 RX ADMIN — ROPINIROLE HYDROCHLORIDE 0.25 MG: 0.25 TABLET, FILM COATED ORAL at 21:42

## 2022-06-07 RX ADMIN — ASPIRIN 324 MG: 81 TABLET, CHEWABLE ORAL at 09:45

## 2022-06-07 RX ADMIN — SODIUM CHLORIDE, PRESERVATIVE FREE 10 ML: 5 INJECTION INTRAVENOUS at 21:45

## 2022-06-07 RX ADMIN — HEPARIN SODIUM 4000 UNITS: 1000 INJECTION INTRAVENOUS; SUBCUTANEOUS at 11:11

## 2022-06-07 RX ADMIN — AMITRIPTYLINE HYDROCHLORIDE 10 MG: 10 TABLET, FILM COATED ORAL at 21:43

## 2022-06-07 RX ADMIN — Medication 12 UNITS/KG/HR: at 11:13

## 2022-06-07 ASSESSMENT — ENCOUNTER SYMPTOMS
NAUSEA: 0
SORE THROAT: 0
CONSTIPATION: 0
VOMITING: 0
DIARRHEA: 0
SHORTNESS OF BREATH: 0
ABDOMINAL PAIN: 0

## 2022-06-07 ASSESSMENT — PAIN DESCRIPTION - FREQUENCY: FREQUENCY: CONTINUOUS

## 2022-06-07 ASSESSMENT — PAIN DESCRIPTION - DESCRIPTORS: DESCRIPTORS: SHARP

## 2022-06-07 ASSESSMENT — PAIN SCALES - GENERAL: PAINLEVEL_OUTOF10: 6

## 2022-06-07 ASSESSMENT — PAIN DESCRIPTION - LOCATION: LOCATION: CHEST

## 2022-06-07 ASSESSMENT — PAIN DESCRIPTION - ORIENTATION: ORIENTATION: MID

## 2022-06-07 ASSESSMENT — PAIN - FUNCTIONAL ASSESSMENT: PAIN_FUNCTIONAL_ASSESSMENT: 0-10

## 2022-06-07 NOTE — ED PROVIDER NOTES
[facility]     Emergency Department     Faculty Note/ Attestation      Pt Name: Burton Nino                                       MRN: [id number]  Armstrongfjosey 1940  Date of evaluation: 6/7/2022  Patients PCP:    JW Abdalla CNP    Attestation  I performed a history and physical examination of the patient/ or directly observed  and discussed management with the resident. I reviewed the residents note and agree with the documented findings and plan of care. Any areas of disagreement are noted on the chart. I was personally present for the key portions of any procedures. I have documented in the chart those procedures where I was not present during the key portions. I have reviewed the emergency nurses triage note. I agree with the chief complaint, past medical history, past surgical history, allergies, medications, social and family history as documented unless otherwise noted below. For Physician Assistant/ Nurse Practitioner cases/documentation I have personally evaluated this patient and have completed at least one if not all key elements of the E/M (history, physical exam, and MDM). Additional findings are as noted. This patient was evaluated in the Emergency Department for symptoms described in the history of present illness. The patient was evaluated in the context of the global COVID-19 pandemic, which necessitated consideration that the patient might be at risk for infection with the SARS-CoV-2 virus that causes COVID-19. Institutional protocols and algorithms that pertain to the evaluation of patients at risk for COVID-19 are in a state of rapid change based on information released by regulatory bodies including the CDC and federal and state organizations. These policies and algorithms were followed during the patient's care in the ED.      Initial Screens:        Good Coma Scale  Eye Opening: Spontaneous  Best Verbal Response: Oriented  Best Motor Response: Obeys commands  Good Coma Scale Score: 15    Vitals:    Vitals:    06/07/22 0932   BP: (!) 159/141   Pulse: 95   Resp: 19   Temp: 98 °F (36.7 °C)   TempSrc: Oral   SpO2: 98%       Chief Complaint      Chief Complaint   Patient presents with    Chest Pain          oral temperature is 98 °F (36.7 °C). His blood pressure is 159/141 (abnormal) and his pulse is 95. His respiration is 19 and oxygen saturation is 98%.             DIAGNOSTIC RESULTS       RADIOLOGY:   No orders to display         LABS:  Labs Reviewed - No data to display      EMERGENCY DEPARTMENT COURSE:     -------------------------      BP: (!) 159/141, Temp: 98 °F (36.7 °C), Heart Rate: 95, Resp: 19    System Problem List     Patient Active Problem List   Diagnosis    Temporary loss of eyesight    Impaired renal function    Syncope : posterior circulation stroke vs Neurocardiogenic syncope     Intracranial bleed : traumatic left sub-dural hematoma ,managed conservatively in 2011    Headache    CKD (chronic kidney disease) stage 3, GFR 30-59 ml/min ([facility])    Depression    Hyperlipidemia    Microhematuria    Microalbuminuria    Essential hypertension    Generalized abdominal pain    Tubular adenoma of colon    Diverticulosis of colon    History of skull fracture    NSTEMI (non-ST elevated myocardial infarction) ([facility])    Nausea    Pure hypercholesterolemia    Prediabetes    Parkinson disease ([facility])    Chronic post-traumatic headache, not intractable    Fall (on) (from) other stairs and steps, initial encounter    Strain of iliopsoas muscle, initial encounter    Chronic pain of left knee    Closed fracture of second toe of right foot    Closed fracture of second toe of left foot    Abnormal ECG    [facility]brovascular accident ([facility]    Chest pain, unspecified    Hematoma of scalp    Left ventricular hypertrophy    Mild aortic valve regurgitation    Pulmonary hypertension, mild ([facility])    Lumbar radiculopathy   

## 2022-06-07 NOTE — H&P
[facility]  Office: [address], DO, Andrzej Miranda, DO, Mike Mills, DO, Juan White, DO, Derian Regalado MD, Mariaa Christensen MD, Vaughn Murdock MD, Roc Mix MD, Neelima Rizo MD, Jorge Luis Santiago MD, Alesha Hennessy MD, Dennise Sen DO, Dolores Duncan MD,  Donovan Jimenez DO, Vannesa Brenner MD, Sary Miller MD, Dioni Garrett MD, Paul Arteaga DO, Anoop Parikh MD, Jordy Kolb MD, Rodney Zuluaga DO, Claudeen Hook, MD, Eric Jaime, [facility], [facility], CNP, Susan Owen, CNP, Fco Gamble, CNP, Lan aHre, CNP, Jaqueline Arrieta, CNP, Vaughn Padilla, PA-C, Rhea Macdonald, DNP, Rach Huerta, CNP, Mason Jaimes, CNP, Kyle Shin, CNP, Marylene Bucy, CNS, Will Bowser, [facility], Carmelita Block, CNP, Orin Rivera, CNP, Hui Louise, [address]    HISTORY AND PHYSICAL EXAMINATION            Date:   6/7/2022  Patient name:  Derian Jones  Date of admission:  6/7/2022  9:22 AM  MRN:   [id number]  Account:  [de-identified]  YOB: 1940  PCP:    JW Miranda CNP  Room:   18/18  Code Status:    Prior    Chief Complaint:     Chief Complaint   Patient presents with    Chest Pain       History Obtained From:     patient, electronic medical record    History of Present Illness:     Derian Jones is a 79 y.o.  /  male who presents with Chest Pain   and is admitted to the hospital for the management of NSTEMI (non-ST elevated myocardial infarction) ([facility] [address] This is an 79-year-old male with a past medical history listed below who presents to the emergency department with a chief complaint of sharp chest pain 7-8/10 radiating to his back with shortness of breath and dizziness. Not current and improved with nitro. Also endorsing some left knee pain after recent steroid injection with his pain management provider at [facility] pain management clinic on 6/6/2022. Upon presentation to the emergency department initial troponin was slightly elevated from his baseline at 41 uptrending to 59 without significant EKG changes. Patient was given nitro and started on a heparin infusion in the emergency department and cardiology was consulted. Other metabolic and hematologic abnormalities included elevated lactic acid of 3.6, sodium of 133, CO2 18, CRT 1.32, GFR 52, proBNP 664, glucose 224, WBC 17.8. BC X2 were collected secondary to leukocytosis, patient is afebrile states no recent fever or chills. Chest x-ray negative for acute process    Patient was admitted to medicine for further work-up and management with cardiology assistance and plan of care    On assessment patient is awake and alert sitting on the cot in the emergency department in no acute distress. Vital signs are stable. Tolerating room air without difficulty. Heparin drip is infusing. Currently stating chest pain has resolved. No shortness of breath, nausea, vomiting, diarrhea, constipation, fever, chills, urinary symptoms or pain. Resting tremor noted. Left knee injection site still showing traces of Betadine cleansing without effusion.   Range of motion is intact, knee joint is not warm nor red    stress test from 9/1/21 low risk with an EF of 59%    Current nonsmoker/previous smoker   Occasional beer drinking 2 times a week  Takes medication as prescribed per patient has not been out of any medication at      Past Medical History:     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 ([facility] [address] 3/18/2011    d/t being hurt on the job    CKD (chronic kidney disease) stage 2, GFR 60-89 ml/min     CKD (chronic kidney disease) stage 3, GFR 30-59 ml/min ([facility])     Diverticulosis of colon     GERD (gastroesophageal reflux disease)     Impaired renal function     MRSA (methicillin resistant staph aureus) culture positive 9/23/2015    leg    Osteoarthritis of knee Left    Parkinson disease ([facility] [address]     Proteinuria     Skull fracture ([facility] [address] 3/18/2011    d/t 12-foot drop on the job    Temporary loss of eyesight     periodically, happened x7    Tubular adenoma of colon 2012, 2017    isaiah        Past Surgical History:     Past Surgical History:   Procedure Laterality Date    COLONOSCOPY  11/02/2012    poor prep, tubular adenoma    COLONOSCOPY  09/19/2017    diverticulosis, multiple polyps as described, spot marking done, pathology-tubular adenoma x 4    AL COLSC FLX W/RMVL OF TUMOR POLYP LESION SNARE TQ N/A 9/19/2017    COLONOSCOPY POLYPECTOMY SNARE/COLD BIOPSY with tatooing and apc transverse colon performed by Nacho Wayne MD at [facility] Right     rotator cuff        Medications Prior to Admission:     Prior to Admission medications    Medication Sig Start Date End Date Taking? Authorizing Provider   entacapone (COMTAN) 200 MG tablet TAKE ONE (1) TABLET BY MOUTH FOUR (4) TIMES DAILY WITH SINEMET 5/26/22   Jose Kaur MD   magnesium oxide (MAG-OX) 400 (240 Mg) MG tablet TAKE 1 TABLET BY MOUTH ONCE DAILY 5/23/22   JW Chowdhury CNP   atorvastatin (LIPITOR) 40 MG tablet take 1 tablet by mouth once daily 5/17/22   JW Chowdhury CNP   azithromycin (ZITHROMAX) 250 MG tablet Take 2 tabs (500 mg) on Day 1, and take 1 tab (250 mg) on days 2 through 5.   Patient not taking: Reported on 6/6/2022 5/11/22   JW Chowdhury CNP   diclofenac sodium (VOLTAREN) 1 % GEL Apply 2 g topically 2 times daily as needed for Pain (joint pain) 5/9/22   JW Kelley NP   amitriptyline (ELAVIL) 10 MG tablet Take 1 tablet by mouth nightly 4/5/22   Anuj Cadena MD   esomeprazole (NEXIUM) 20 MG delayed release capsule TAKE 1 CAPSULE BY MOUTH ONCE DAILY IN THE MORNING BEFORE BREAKFAST 2/23/22   JW Chowdhury CNP   rOPINIRole (REQUIP) 0.25 MG tablet TAKE ONE (1) TABLET BY MOUTH THREE (3) TIMES DAILY 2/23/22   Cristian Brandon Anthony, APRN - CNP   carbidopa-levodopa (SINEMET)  MG per tablet TAKE 1 TABLET BY MOUTH 4 TIMES DAILY 12/3/21 3/18/22  JW Valles CNP   clopidogrel (PLAVIX) 75 MG tablet TAKE 1 TABLET BY MOUTH ONCE DAILY  11/5/21   JW Valles CNP   allopurinol (ZYLOPRIM) 100 MG tablet TAKE 1 TABLET BY MOUTH ONCE DAILY  11/5/21   JW Valles CNP   nitroGLYCERIN (NITROSTAT) 0.4 MG SL tablet Place 1 tablet under the tongue every 5 minutes as needed for Chest pain If no relief of chest pain after 3rd dose, go to the ER or call 911 6/14/21   JW Baker CNP   acetaminophen (TYLENOL 8 HOUR ARTHRITIS PAIN) 650 MG extended release tablet Take 1 tablet by mouth every 8 hours as needed for Pain 6/14/21   JW Baker CNP   butalbital-acetaminophen-caffeine (FIORICET, ESGIC) -40 MG per tablet Take 1 tab prn only for severe headache. Can repeat after 4 hrs if needed. Max 2 tabs/24 hrs. Max 4 tabs/week 6/14/21   JW Baker CNP        Allergies:     Dye [iodides] and Aspirin    Social History:     Tobacco:    reports that he has quit smoking. He has never used smokeless tobacco.  Alcohol:      reports no history of alcohol use. Drug Use:  reports no history of drug use. Family History:     Family History   Problem Relation Age of Onset    No Known Problems Mother     No Known Problems Father        Review of Systems:     Positive and Negative as described in HPI.     CONSTITUTIONAL:  negative for fevers, chills, sweats, fatigue, weight loss  HEENT:  negative for vision, hearing changes, runny nose, throat pain  RESPIRATORY:  negative for shortness of breath, cough, congestion, wheezing  CARDIOVASCULAR:  + improving chest pain, palpitations  GASTROINTESTINAL:  negative for nausea, vomiting, diarrhea, constipation, change in bowel habits, abdominal pain   GENITOURINARY:  negative for difficulty of urination, burning with urination, frequency INTEGUMENT:  negative for rash, skin lesions, easy bruising   HEMATOLOGIC/LYMPHATIC:  negative for swelling/edema   ALLERGIC/IMMUNOLOGIC:  negative for urticaria , itching  ENDOCRINE:  negative increase in drinking, increase in urination, hot or cold intolerance  MUSCULOSKELETAL:  negative joint pains, muscle aches, swelling of joints  NEUROLOGICAL:  negative for headaches, dizziness, lightheadedness, numbness, pain, tingling extremities  BEHAVIOR/PSYCH:  negative for depression, anxiety    Physical Exam:   BP (!) 108/93   Pulse 72   Temp 98 °F (36.7 °C) (Oral)   Resp 17   SpO2 98%   Temp (24hrs), Av.3 °F (36.8 °C), Min:98 °F (36.7 °C), Max:98.4 °F (36.9 °C)    No results for input(s): POCGLU in the last 72 hours.   No intake or output data in the 24 hours ending 22 1257    General Appearance: alert, well appearing, and in no acute distress  Mental status: oriented to person, place, and time  Head: normocephalic, atraumatic  Eye: no icterus, redness, pupils equal and reactive, extraocular eye movements intact, conjunctiva clear  Ear: normal external ear, no discharge, hearing intact  Nose: no drainage noted  Mouth: mucous membranes moist  Neck: supple, no carotid bruits, thyroid not palpable  Lungs: Bilateral equal air entry, clear to ausculation, no wheezing, rales or rhonchi, normal effort  Cardiovascular: normal rate, regular rhythm, no murmur, gallop, rub  Abdomen: Soft, nontender, nondistended, normal bowel sounds, no hepatomegaly or splenomegaly  Neurologic: There are no new focal motor or sensory deficits, normal muscle tone and bulk, no abnormal sensation, normal speech, cranial nerves II through XII grossly intact, + resting tremor   Skin: No gross lesions, rashes, bruising or bleeding on exposed skin area  Extremities: peripheral pulses palpable, no pedal edema or calf pain with palpation  Psych: normal affect    Investigations:      Laboratory Testing:  Recent Results (from the past 24 hour(s))   CBC with Auto Differential    Collection Time: 06/07/22  9:37 AM   Result Value Ref Range    WBC 17.8 (H) 3.5 - 11.3 k/uL    RBC 4.43 4.21 - 5.77 m/uL    Hemoglobin 14.1 13.0 - 17.0 g/dL    Hematocrit 41.6 40.7 - 50.3 %    MCV 93.9 82.6 - 102.9 fL    MCH 31.8 25.2 - 33.5 pg    MCHC 33.9 28.4 - 34.8 g/dL    RDW 14.2 11.8 - 14.4 %    Platelets 241 588 - 747 k/uL    MPV 8.9 8.1 - 13.5 fL    NRBC Automated 0.0 0.0 per 100 WBC    Seg Neutrophils 89 (H) 36 - 65 %    Lymphocytes 6 (L) 24 - 43 %    Monocytes 4 3 - 12 %    Eosinophils % 0 (L) 1 - 4 %    Basophils 0 0 - 2 %    Immature Granulocytes 1 (H) 0 %    Segs Absolute 15.94 (H) 1.50 - 8.10 k/uL    Absolute Lymph # 1.00 (L) 1.10 - 3.70 k/uL    Absolute Mono # 0.69 0.10 - 1.20 k/uL    Absolute Eos # <0.03 0.00 - 0.44 k/uL    Basophils Absolute <0.03 0.00 - 0.20 k/uL    Absolute Immature Granulocyte 0.10 0.00 - 0.30 k/uL   Basic Metabolic Panel w/ Reflex to MG    Collection Time: 06/07/22  9:37 AM   Result Value Ref Range    Glucose 224 (H) 70 - 99 mg/dL    BUN 17 8 - 23 mg/dL    CREATININE 1.32 (H) 0.70 - 1.20 mg/dL    Calcium 9.5 8.6 - 10.4 mg/dL    Sodium 133 (L) 135 - 144 mmol/L    Potassium 4.2 3.7 - 5.3 mmol/L    Chloride 101 98 - 107 mmol/L    CO2 18 (L) 20 - 31 mmol/L    Anion Gap 14 9 - 17 mmol/L    GFR Non-African American 52 (L) >60 mL/min    GFR African American >60 >60 mL/min    GFR Comment         Troponin    Collection Time: 06/07/22  9:37 AM   Result Value Ref Range    Troponin, High Sensitivity 41 (H) 0 - 22 ng/L   Brain Natriuretic Peptide    Collection Time: 06/07/22  9:37 AM   Result Value Ref Range    Pro- (H) <300 pg/mL   D-Dimer, Quantitative    Collection Time: 06/07/22  9:37 AM   Result Value Ref Range    D-Dimer, Quant 0.39 mg/L FEU   APTT    Collection Time: 06/07/22  9:37 AM   Result Value Ref Range    PTT 24.2 20.5 - 30.5 sec   Protime-INR    Collection Time: 06/07/22  9:37 AM   Result Value Ref Range    Protime 10.5 9.1 - 12.3 sec    INR 1.0    Lactate, Sepsis    Collection Time: 06/07/22 10:38 AM   Result Value Ref Range    Lactic Acid, Sepsis, Whole Blood 3.6 (H) 0.5 - 1.9 mmol/L   Troponin    Collection Time: 06/07/22 10:39 AM   Result Value Ref Range    Troponin, High Sensitivity 54 (HH) 0 - 22 ng/L   Culture, Blood 1    Collection Time: 06/07/22 10:45 AM    Specimen: Blood   Result Value Ref Range    Specimen Description . BLOOD     Special Requests RAC 10ML     Culture NO GROWTH <24 HRS    Culture, Blood 2    Collection Time: 06/07/22 10:46 AM    Specimen: Blood   Result Value Ref Range    Specimen Description . BLOOD     Special Requests RFA 16ML     Culture NO GROWTH <24 HRS        Imaging/Diagnostics:  XR CHEST PORTABLE    Result Date: 6/7/2022  No acute cardiopulmonary process. Assessment :      Hospital Problems           Last Modified POA    * (Principal) NSTEMI (non-ST elevated myocardial infarction) ([facility] [address] 6/7/2022 Yes    Chronic pain of multiple joints (Chronic) 6/7/2022 Yes    Lactic acidosis 6/7/2022 Yes    Gout 6/7/2022 Yes    CKD (chronic kidney disease) stage 3, GFR 30-59 ml/min ([facility] [address] 6/7/2022 Yes    Depression 6/7/2022 Yes    Hyperlipidemia 6/7/2022 Yes    Essential hypertension 6/7/2022 Yes    Parkinson disease ([facility] [address] 6/7/2022 Yes    Chronic pain of left knee 6/7/2022 Yes    Overweight (BMI 25.0-29.9) 6/7/2022 Yes          Plan:     Patient status inpatient in the Progressive Unit/Step down    1. NSTEMI:   - Troponin elevation at 41 initially on presentation uptrending to 54.    - No EKG changes  - Cardiology consulted. - Patient continues on heparin infusion.    - Patient received ASA, nitro  - Further recommendations and plan of care per cardiology    2. Primary hypertension:   - Blood pressure elevated on admission, improving    3. CKD stage IIIa:   - Currently at baseline  - follow with Dr. Mark Pascal    4. DMT2: With hyperglycemia    5.  History of h/a with previous skull fx/head trauma in 2011:   - Not currently active, continue amitriptyline    6. Dyslipidemia: On moderate intensity statin, continue    7. Parkinson's: On Sinemet with Comtan 4 times daily    8. Leukocytosis:   - Recent steroid injection on 6/6/2022 to the left knee with pain management  - Follow-up on blood cultures x2  - Check UA/culture  -Chest x-ray negative for acute process    9. Chronic pain patient:   - Recently evaluated by pain management on 6/6/2022  - received joint injection to the left knee    10. History of gout: Continue allopurinol    11. GI/DVT prophylaxis: Protonix, heparin gtt    Consultations:   IP CONSULT TO CARDIOLOGY  IP CONSULT TO HOSPITALIST  IP CONSULT TO CARDIOLOGY    Patient is admitted as inpatient status because of co-morbidities listed above, severity of signs and symptoms as outlined, requirement for current medical therapies and most importantly because of direct risk to patient if care not provided in a hospital setting. Expected length of stay > 48 hours.     JW Ambrocio NP  6/7/2022  12:57 PM    Copy sent to JW Moulton - CNP

## 2022-06-07 NOTE — ACP (ADVANCE CARE PLANNING)
Advance Care Planning     Advance Care Planning Activator (Inpatient)  Conversation Note      Date of ACP Conversation: 6/7/2022     Conversation Conducted with: Patient with Decision Making Capacity    ACP Activator: Jamal Hess, [facility] Decision Maker:     Current Designated Health Care Decision Maker:     Primary Decision Maker:  Shaheen Fuentes - [phone]  Secondary Decision Maker:  Corina Wen - [phone]    Click here to complete Healthcare Decision Makers including section of the Healthcare Decision Maker Relationship (ie \"Primary\")  Today we documented Decision Maker(s) consistent with Legal Next of Kin hierarchy. Care Preferences    Ventilation: \"If you were in your present state of health and suddenly became very ill and were unable to breathe on your own, what would your preference be about the use of a ventilator (breathing machine) if it were available to you? \"      Would the patient desire the use of ventilator (breathing machine)?: yes    \"If your health worsens and it becomes clear that your chance of recovery is unlikely, what would your preference be about the use of a ventilator (breathing machine) if it were available to you? \"     Would the patient desire the use of ventilator (breathing machine)?: Yes      Resuscitation  \"CPR works best to restart the heart when there is a sudden event, like a heart attack, in someone who is otherwise healthy. Unfortunately, CPR does not typically restart the heart for people who have serious health conditions or who are very sick. \"    \"In the event your heart stopped as a result of an underlying serious health condition, would you want attempts to be made to restart your heart (answer \"yes\" for attempt to resuscitate) or would you prefer a natural death (answer \"no\" for do not attempt to resuscitate)? \" yes       [x] Yes   [] No   Educated Patient / Decision Maker regarding differences between Advance Directives and portable DNR orders. Length of ACP Conversation in minutes:  10  Conversation Outcomes:  [x] ACP discussion completed  [] Existing advance directive reviewed with patient; no changes to patient's previously recorded wishes  [] New Advance Directive completed  [] Portable Do Not Rescitate prepared for Provider review and signature  [] POLST/POST/MOLST/MOST prepared for Provider review and signature      Follow-up plan:    [x] Schedule follow-up conversation to continue planning  [] Referred individual to Provider for additional questions/concerns   [] Advised patient/agent/surrogate to review completed ACP document and update if needed with changes in condition, patient preferences or care setting    [] This note routed to one or more involved healthcare providers        Zak Rios

## 2022-06-07 NOTE — ED NOTES
The following labs labeled with pt sticker and tubed to lab:     [x] Blue     [x] Lavender   [] on ice  [x] Green/yellow  [x] Green/black [x] on ice  [] Yellow  [] Red  [] Pink      [] COVID-19 swab    [] Rapid  [] PCR  [] Flu swab  [] Peds Viral Panel     [] Urine Sample  [] Pelvic Cultures  [x] Blood Cultures          Corby Chanel RN  06/07/22 1038

## 2022-06-07 NOTE — ED PROVIDER NOTES
(09/19/2017). Social History     Socioeconomic History    Marital status: Single     Spouse name: Not on file    Number of children: Not on file    Years of education: Not on file    Highest education level: Not on file   Occupational History    Not on file   Tobacco Use    Smoking status: Former Smoker    Smokeless tobacco: Never Used   Vaping Use    Vaping Use: Never used   Substance and Sexual Activity    Alcohol use: No    Drug use: No    Sexual activity: Not Currently   Other Topics Concern    Not on file   Social History Narrative    Not on file     Social Determinants of Health     Financial Resource Strain: Low Risk     Difficulty of Paying Living Expenses: Not hard at all   Food Insecurity: No Food Insecurity    Worried About [employer] in the Last Year: Never true    Destiny of Food in the Last Year: Never true   Transportation Needs: Unmet Transportation Needs    Lack of Transportation (Medical): No    Lack of Transportation (Non-Medical): Yes   Physical Activity: Inactive    Days of Exercise per Week: 0 days    Minutes of Exercise per Session: 0 min   Stress: Stress Concern Present    Feeling of Stress : To some extent   Social Connections: Socially Isolated    Frequency of Communication with Friends and Family: Three times a week    Frequency of Social Gatherings with Friends and Family:  Three times a week    Attends Islam Services: Never    Active Member of Clubs or Organizations: No    Attends Club or Organization Meetings: Never    Marital Status:    Intimate Partner Violence:     Fear of Current or Ex-Partner: Not on file    Emotionally Abused: Not on file    Physically Abused: Not on file    Sexually Abused: Not on file   Housing Stability: [address] Unable to Pay for Housing in the Last Year: No    Number of [place] in the Last Year: 1    Unstable Housing in the Last Year: No       Family History   Problem Relation Age of Onset    No Known Problems Mother     No Known Problems Father        Allergies:  Dye [iodides] and Aspirin    Home Medications:  Prior to Admission medications    Medication Sig Start Date End Date Taking? Authorizing Provider   entacapone (COMTAN) 200 MG tablet TAKE ONE (1) TABLET BY MOUTH FOUR (4) TIMES DAILY WITH SINEMET 5/26/22   Alyssa Dempsey MD   magnesium oxide (MAG-OX) 400 (240 Mg) MG tablet TAKE 1 TABLET BY MOUTH ONCE DAILY 5/23/22   JW Ortez CNP   atorvastatin (LIPITOR) 40 MG tablet take 1 tablet by mouth once daily 5/17/22   JW Ortez CNP   azithromycin (ZITHROMAX) 250 MG tablet Take 2 tabs (500 mg) on Day 1, and take 1 tab (250 mg) on days 2 through 5.   Patient not taking: Reported on 6/6/2022 5/11/22   JW Ortez CNP   diclofenac sodium (VOLTAREN) 1 % GEL Apply 2 g topically 2 times daily as needed for Pain (joint pain) 5/9/22   JW Kim NP   amitriptyline (ELAVIL) 10 MG tablet Take 1 tablet by mouth nightly 4/5/22   Billie James MD   esomeprazole (NEXIUM) 20 MG delayed release capsule TAKE 1 CAPSULE BY MOUTH ONCE DAILY IN THE MORNING BEFORE BREAKFAST 2/23/22   JW Ortez CNP   rOPINIRole (REQUIP) 0.25 MG tablet TAKE ONE (1) TABLET BY MOUTH THREE (3) TIMES DAILY 2/23/22   JW Ortez CNP   carbidopa-levodopa (SINEMET)  MG per tablet TAKE 1 TABLET BY MOUTH 4 TIMES DAILY 12/3/21 3/18/22  JW Vega CNP   clopidogrel (PLAVIX) 75 MG tablet TAKE 1 TABLET BY MOUTH ONCE DAILY  11/5/21   JW Vega CNP   allopurinol (ZYLOPRIM) 100 MG tablet TAKE 1 TABLET BY MOUTH ONCE DAILY  11/5/21   JW Vega CNP   nitroGLYCERIN (NITROSTAT) 0.4 MG SL tablet Place 1 tablet under the tongue every 5 minutes as needed for Chest pain If no relief of chest pain after 3rd dose, go to the ER or call 911 6/14/21   Susan Deep, APRN - CNP   acetaminophen (TYLENOL 8 HOUR ARTHRITIS PAIN) 650 MG extended release tablet Take 1 tablet by mouth every 8 hours as needed for Pain 6/14/21   JW Real - CNP   butalbital-acetaminophen-caffeine (FIORICET, ESGIC) -40 MG per tablet Take 1 tab prn only for severe headache. Can repeat after 4 hrs if needed. Max 2 tabs/24 hrs. Max 4 tabs/week 6/14/21   JW Real CNP       REVIEW OF SYSTEMS    (2-9 systems for level 4, 10 or more for level 5)      Review of Systems   Constitutional: Negative for chills and fever. HENT: Negative for ear pain, hearing loss and sore throat. Eyes: Negative for visual disturbance. Respiratory: Negative for shortness of breath. Cardiovascular: Positive for chest pain. Gastrointestinal: Negative for abdominal pain, constipation, diarrhea, nausea and vomiting. Genitourinary: Negative for difficulty urinating and dysuria. Musculoskeletal: Negative for arthralgias and myalgias. Neurological: Negative for numbness. Psychiatric/Behavioral: Negative for agitation and confusion. PHYSICAL EXAM   (up to 7 for level 4, 8 or more for level 5)      INITIAL VITALS:   /82   Pulse 77   Temp 98 °F (36.7 °C) (Oral)   Resp 24   SpO2 97%     Physical Exam  Vitals and nursing note reviewed. Constitutional:       General: He is not in acute distress. Appearance: Normal appearance. He is well-developed. He is not ill-appearing or diaphoretic. HENT:      Head: Normocephalic and atraumatic. Right Ear: External ear normal.      Left Ear: External ear normal.      Nose: Nose normal.      Mouth/Throat:      Mouth: Mucous membranes are moist.   Eyes:      Extraocular Movements: Extraocular movements intact. Conjunctiva/sclera: Conjunctivae normal.   Neck:      Trachea: No tracheal deviation. Cardiovascular:      Rate and Rhythm: Normal rate and regular rhythm. Heart sounds: Murmur (faint blowing systolic) heard. No friction rub. No gallop.     Pulmonary:      Effort: Pulmonary effort is normal. No respiratory distress. Breath sounds: Normal breath sounds. No wheezing, rhonchi or rales. Abdominal:      General: Abdomen is flat. There is no distension. Musculoskeletal:         General: No swelling, deformity or signs of injury. Normal range of motion. Cervical back: Normal range of motion and neck supple. Right lower leg: No edema. Left lower leg: No edema. Comments: No calf tenderness or edema   Skin:     General: Skin is warm and dry. Coloration: Skin is not jaundiced. Findings: No bruising or lesion. Neurological:      General: No focal deficit present. Mental Status: He is alert and oriented to person, place, and time. Mental status is at baseline. Motor: No abnormal muscle tone. DIFFERENTIAL  DIAGNOSIS     PLAN (LABS / IMAGING / EKG):  Orders Placed This Encounter   Procedures    Culture, Urine    Culture, Blood 1    Culture, Blood 2    XR CHEST PORTABLE    CBC with Auto Differential    Basic Metabolic Panel w/ Reflex to MG    Troponin    Brain Natriuretic Peptide    D-Dimer, Quantitative    Urinalysis    Lactate, Sepsis    APTT    APTT    Hepatic Function Panel    Protime-INR    Comprehensive Metabolic Panel w/ Reflex to MG    Magnesium    Troponin    CBC    Lipid Panel    ADULT DIET; Clear Liquid;  No Caffeine    Vital signs per unit routine    Notify physician    Notify Cardiologist    Bedrest    Daily weights    Intake and output    Place intermittent pneumatic compression device    Telemetry monitoring - continuous duration    Full Code    Inpatient consult to Cardiology    Inpatient consult to Ebenezer Aguila to Cardiology    OT eval and treat    PT evaluation and treat    Initiate Oxygen Therapy Protocol    Pulse oximetry, continuous    EKG 12 lead    EKG 12 lead    ADMIT TO INPATIENT       MEDICATIONS ORDERED:  Orders Placed This Encounter   Medications    nitroGLYCERIN (NITROSTAT) SL tablet 0.4 mg    aspirin chewable tablet 324 mg    heparin (porcine) injection 4,000 Units    heparin (porcine) injection 4,000 Units    heparin (porcine) injection 2,000 Units    heparin 25,000 units in dextrose 5 % 250 mL infusion (rate based)    cefTRIAXone (ROCEPHIN) 1000 mg IVPB in NS 50ml minibag     Order Specific Question:   Antimicrobial Indications     Answer:   Sepsis of Unknown Etiology    allopurinol (ZYLOPRIM) tablet 100 mg    amitriptyline (ELAVIL) tablet 10 mg    atorvastatin (LIPITOR) tablet 40 mg    carbidopa-levodopa (SINEMET)  MG per tablet 1 tablet    clopidogrel (PLAVIX) tablet 75 mg    entacapone (COMTAN) tablet 200 mg    pantoprazole (PROTONIX) tablet 40 mg    magnesium oxide (MAG-OX) tablet 400 mg    rOPINIRole (REQUIP) tablet 0.25 mg    sodium chloride flush 0.9 % injection 5-40 mL    sodium chloride flush 0.9 % injection 10 mL    0.9 % sodium chloride infusion    OR Linked Order Group     potassium chloride (KLOR-CON M) extended release tablet 40 mEq     potassium bicarb-citric acid (EFFER-K) effervescent tablet 40 mEq     potassium chloride 10 mEq/100 mL IVPB (Peripheral Line)    magnesium sulfate 1000 mg in dextrose 5% 100 mL IVPB    OR Linked Order Group     ondansetron (ZOFRAN-ODT) disintegrating tablet 4 mg     ondansetron (ZOFRAN) injection 4 mg    OR Linked Order Group     acetaminophen (TYLENOL) tablet 650 mg     acetaminophen (TYLENOL) suppository 650 mg    magnesium hydroxide (MILK OF MAGNESIA) 400 MG/5ML suspension 30 mL    metoprolol tartrate (LOPRESSOR) tablet 12.5 mg    nitroGLYCERIN (NITROSTAT) SL tablet 0.4 mg    DISCONTD: heparin (porcine) injection 4,000 Units    DISCONTD: heparin (porcine) injection 4,630 Units    DISCONTD: heparin (porcine) injection 2,310 Units    DISCONTD: heparin 25,000 units in dextrose 5 % 250 mL infusion (rate based)       DDX: Derrell Shane is a 79 y.o. male who presents to the emergency department with chest pain. Differential diagnosis includes ACS, PE, pneumonia, pneumothorax, musculoskeletal chest wall pain    DIAGNOSTIC RESULTS / EMERGENCY DEPARTMENT COURSE / MDM   LAB RESULTS:  Results for orders placed or performed during the hospital encounter of 06/07/22   Culture, Blood 1    Specimen: Blood   Result Value Ref Range    Specimen Description . BLOOD     Special Requests RAC 10ML     Culture NO GROWTH <24 HRS    Culture, Blood 2    Specimen: Blood   Result Value Ref Range    Specimen Description . BLOOD     Special Requests RFA 16ML     Culture NO GROWTH <24 HRS    CBC with Auto Differential   Result Value Ref Range    WBC 17.8 (H) 3.5 - 11.3 k/uL    RBC 4.43 4.21 - 5.77 m/uL    Hemoglobin 14.1 13.0 - 17.0 g/dL    Hematocrit 41.6 40.7 - 50.3 %    MCV 93.9 82.6 - 102.9 fL    MCH 31.8 25.2 - 33.5 pg    MCHC 33.9 28.4 - 34.8 g/dL    RDW 14.2 11.8 - 14.4 %    Platelets 031 834 - 853 k/uL    MPV 8.9 8.1 - 13.5 fL    NRBC Automated 0.0 0.0 per 100 WBC    Seg Neutrophils 89 (H) 36 - 65 %    Lymphocytes 6 (L) 24 - 43 %    Monocytes 4 3 - 12 %    Eosinophils % 0 (L) 1 - 4 %    Basophils 0 0 - 2 %    Immature Granulocytes 1 (H) 0 %    Segs Absolute 15.94 (H) 1.50 - 8.10 k/uL    Absolute Lymph # 1.00 (L) 1.10 - 3.70 k/uL    Absolute Mono # 0.69 0.10 - 1.20 k/uL    Absolute Eos # <0.03 0.00 - 0.44 k/uL    Basophils Absolute <0.03 0.00 - 0.20 k/uL    Absolute Immature Granulocyte 0.10 0.00 - 0.30 k/uL   Basic Metabolic Panel w/ Reflex to MG   Result Value Ref Range    Glucose 224 (H) 70 - 99 mg/dL    BUN 17 8 - 23 mg/dL    CREATININE 1.32 (H) 0.70 - 1.20 mg/dL    Calcium 9.5 8.6 - 10.4 mg/dL    Sodium 133 (L) 135 - 144 mmol/L    Potassium 4.2 3.7 - 5.3 mmol/L    Chloride 101 98 - 107 mmol/L    CO2 18 (L) 20 - 31 mmol/L    Anion Gap 14 9 - 17 mmol/L    GFR Non-African American 52 (L) >60 mL/min    GFR African American >60 >60 mL/min    GFR Comment         Troponin   Result Value Ref Range    Troponin, High Sensitivity 41 (H) 0 - 22 ng/L   Troponin   Result Value Ref Range    Troponin, High Sensitivity 54 (HH) 0 - 22 ng/L   Brain Natriuretic Peptide   Result Value Ref Range    Pro- (H) <300 pg/mL   D-Dimer, Quantitative   Result Value Ref Range    D-Dimer, Quant 0.39 mg/L FEU   Urinalysis   Result Value Ref Range    Color, UA Yellow Yellow    Turbidity UA Clear Clear    Glucose, Ur NEGATIVE NEGATIVE    Bilirubin Urine NEGATIVE NEGATIVE    Ketones, Urine NEGATIVE NEGATIVE    Specific Gravity, UA 1.008 1.005 - 1.030    Urine Hgb NEGATIVE NEGATIVE    pH, UA 6.0 5.0 - 8.0    Protein, UA NEGATIVE NEGATIVE    Urobilinogen, Urine Normal Normal    Nitrite, Urine NEGATIVE NEGATIVE    Leukocyte Esterase, Urine NEGATIVE NEGATIVE    Urinalysis Comments       Microscopic exam not performed based on chemical results unless requested in original order. Lactate, Sepsis   Result Value Ref Range    Lactic Acid, Sepsis, Whole Blood 3.6 (H) 0.5 - 1.9 mmol/L   Lactate, Sepsis   Result Value Ref Range    Lactic Acid, Sepsis, Whole Blood 2.1 (H) 0.5 - 1.9 mmol/L   APTT   Result Value Ref Range    PTT 24.2 20.5 - 30.5 sec   Hepatic Function Panel   Result Value Ref Range    Albumin PENDING g/dL    Alkaline Phosphatase PENDING U/L    ALT PENDING U/L    AST PENDING U/L    Total Bilirubin PENDING mg/dL    Bilirubin, Direct PENDING mg/dL    Bilirubin, Indirect PENDING mg/dL    Total Protein PENDING g/dL    Albumin/Globulin Ratio PENDING    Protime-INR   Result Value Ref Range    Protime 10.5 9.1 - 12.3 sec    INR 1.0    Lipid Panel   Result Value Ref Range    Cholesterol 114 <200 mg/dL    HDL 51 >40 mg/dL    LDL Cholesterol 51 0 - 130 mg/dL    Chol/HDL Ratio 2.2 <5    Triglycerides 58 <150 mg/dL       IMPRESSION: Silas Vides is a 79 y.o. male who presents to the emergency department with chest pain.   On examination he is afebrile, vital signs demonstrate hypertension and borderline tachycardia and examination demonstrates well-appearing male of stated age with pain score of about 4 or 5 out of 10, with slight blowing systolic heart murmur but otherwise normal heart and lung sounds and no swelling or pain in the legs. No clinical signs or symptoms of fluid overload. Concern for ACS given age, PE also in the differential diagnosis given pleuritic nature of pain and sharpness of pain. We will obtain EKG, full cardiac work-up and plan for admission. Patient agreeable. He does have a documented history of aspirin due to a brain bleed but this was noted to be in 2011 after an incident on the job at work. Benefit outweighs risk at this time. Sepsis Times and Checklist  Vital Signs: BP: 111/82  Heart Rate: 77  Resp: 24  Temp: 98 °F (36.7 °C) SpO2: 97 %  SIRS (>2)   Temp > 38.3C or < 36C   HR > 90   RR > 20   WBC > 12 or < 4 or >10% bands  SIRS (>2) and confirmed or suspected source of infection = Sepsis  Is Sepsis due to likely bacterial infection?: Yes    Sepsis Identified:  Date: 6/7/22   Time: 0937  Sepsis Orders:  ·  CBC(required): Yes  ·  CMP: Yes  ·  PT/PTT: Yes  ·  Blood Cultures x2(required): Yes  ·  Urinalysis and Urine Culture: Yes  ·  Lactate(required): Yes  ·  Antibiotics Given (within 3 hours of sepsis identification, after blood cultures):  Yes    (If unable to obtain IV access for IV antibiotics within 3 hours of identification of sepsis, IM antibiotics is acceptable.)  ·             If lactate >2.0 MUST repeat within 6 hours    If elevated, is elevated lactate from a likely infectious source?: No it is secondary to MI. RADIOLOGY:  [facility] FOR SURGICAL PROCEDURES    Result Date: 6/6/2022  Radiology exam is complete. No Radiologist dictation. Please follow up with ordering provider.        EKG  EKG Interpretation    Interpreted by emergency department physician    Rhythm: normal sinus   Rate: normal  Axis: normal  Ectopy: none  Conduction: Right bundle branch block, , QTc 42  ST Segments: no acute change  T Waves: no acute change  Q Waves: none    Clinical Impression: Right bundle branch block present on prior EKG in 2021, abnormal EKG    Cesar Lamb MD    All EKG's are interpreted by the Emergency Department Physician who either signs or co-signs this chart in the absence of a cardiologist.    EMERGENCY DEPARTMENT COURSE:  ED Course as of 06/07/22 1527   Tue Jun 07, 2022   0925 No patient in room [TS]   1021 Elevated troponin consistent with NSTEMI, given leukocytosis we will also obtain septic work-up. Plan for admission. [TS]   1021 Creatinine(!): 1.32  Baseline [TS]   1022 Will give Rocephin for possible UTI as cause of leukocytosis/sepsis [TS]   1116 Troponin, High Sensitivity(!!): 54  Consistent with NSTEMI, heparin already started, cardiology consulted [TS]      ED Course User Index  [TS] Karlene Garrison MD       PROCEDURES:  None    CONSULTS:  IP CONSULT TO CARDIOLOGY  IP CONSULT TO HOSPITALIST  IP CONSULT TO CARDIOLOGY    CRITICAL CARE:  None    FINAL IMPRESSION      1. NSTEMI (non-ST elevated myocardial infarction) ([facility] [address]          DISPOSITION / PLAN     DISPOSITION Admitted 06/07/2022 12:57:22 PM      PATIENT REFERRED TO:  JW Chowdhury - CNP  [address] [facility]  [address] [facility],[address]  [address] [address]  [phone]    Schedule an appointment as soon as possible for a visit in 1 week  For followup    [facility] ED  [address] [address]  [phone]  Go to   As needed, If symptoms worsen      DISCHARGE MEDICATIONS:  New Prescriptions    No medications on file       Karlene Garrison MD  Emergency Medicine Resident    This patient was evaluated in the Emergency Department for symptoms described in the history of present illness. He/she was evaluated in the context of the global COVID-19 pandemic, which necessitated consideration that the patient might be at risk for infection with the SARS-CoV-2 virus that causes COVID-19.  Institutional protocols and algorithms that pertain to the evaluation of patients at risk for COVID-19 are in a state of rapid change based on information released by regulatory bodies including the CDC and federal and state organizations. These policies and algorithms were followed during the patient's care in the ED.     (Please note that portions of thisnote were completed with a voice recognition program.  Efforts were made to edit the dictations but occasionally words are mis-transcribed.)       Jody Jack MD  Resident  06/07/22 4787

## 2022-06-07 NOTE — ED NOTES
Pt to the ER with c/o sudden onset midsternal chest pain that started last night while in bed. Denies SOB, no dizziness or N/V. Has h/o Parkinson's. Placed on full cardiac monitor, no acute distress noted, rr even and NL.  Dr. Sheppard Pacer at the bedside to evaluate the pt     Josiane Regalado RN  06/07/22 1016

## 2022-06-07 NOTE — PROGRESS NOTES
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 Previous Testing:     STRESS 9/3/2021: No ischemia or infarct. EF 59%. ECHO 9/2/2021: EF 60-65%, grade I DD, mild AI/MR/TR/PHTN, RVSP is 41 mmHg     CATH 5/18/18:   1. Recent occlusion of ostial LAD. 2. Successful thrombectomy and PCI / Drug Eluting Stent of the ostial LAD with restoration of GREGORY III flow. 3. Normal LCX and RCA. 4. Mildly impaired ventricular function. Previous office/hospital visit:   Dr. Leidy Fleming 9/5/2021:   1. Weakness/fatigue  2. Sinus bradycardia, improved off metoprolol  3. CAD with PTCA/JENN to LAD in 2018  4. 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 CKD  6. HTN  7. HLP  8. SDH in 2011    Plan --   1. Continue off metoprolol  2. Continue Lipitor and Plavix  3. Will sign off; acceptable for discharge from a cardiac standpoint, with outpatient Holter monitor.     Rico Caldwell [facility]

## 2022-06-07 NOTE — CARE COORDINATION
Case Management Initial Discharge [address],             Met with:patient to discuss discharge plans. Information verified: address, contacts, phone number, , insurance Yes  Insurance Provider: Griselda Sara: No    Emergency Contact/Next of Kin name & number:   1. Jhony Gill  2. Alana Orona      Child  Other [phone]       Who are involved in patient's support system? Malcom Aldana    PCP: JW Mays CNP  Date of last visit: 2 weeks      Discharge Planning    Living Arrangements:        Home has 2 stories  5 stairs to climb to get into front door, does not usestairs to climb to reach second floor  Location of bedroom/bathroom in home main    Patient able to perform ADL's:Independent    Current Services (outpatient & in home) none  DME equipment: cane,walker,shower chair  DME provider:     Is patient receiving oral anticoagulation therapy? Pt is unsure    If indicated:   Physician managing anticoagulation treatment: n/a  Where does patient obtain lab work for ATC treatment? n/a    Does patient have any issues/concerns obtaining medications? No  If yes, what are patient's concerns? Is there a preferred Pharmacy after hours or on weekends? No    If yes, which pharmacy? Potential Assistance Needed:       Patient agreeable to home care: No  [place] of choice provided:  n/a    Prior SNF/Rehab Placement and Facility: none  Agreeable to SNF/Rehab: No  [place] of choice provided: n/a     Evaluation: no    Expected Discharge date:       Patient expects to be discharged to: If home: is the family and/or caregiver wiling & able to provide support at home? Lives al9one  Who will be providing this support?  Malcom Aldana    Follow Up Appointment: Best Day/ Time:      Transportation provider: jaycee  Transportation arrangements needed for discharge: Yes    Readmission Risk              Risk of Unplanned Readmission:  0 Does patient have a readmission risk score greater than 14?: n/a  If yes, follow-up appointment must be made within 7 days of discharge.      Goals of Care: get better      Educated pt on transitional options, provided freedom of choice and are agreeable with plan      Discharge Plan: Plan is home,follow for needs,pcp established,will need cab home          Electronically signed by John Dalal RN on 6/7/22 at 11:37 AM EDT

## 2022-06-08 LAB
ALBUMIN SERPL-MCNC: 3.9 G/DL (ref 3.5–5.2)
ALBUMIN/GLOBULIN RATIO: 1.4 (ref 1–2.5)
ALP BLD-CCNC: 92 U/L (ref 40–129)
ALT SERPL-CCNC: 21 U/L (ref 5–41)
ANION GAP SERPL CALCULATED.3IONS-SCNC: 13 MMOL/L (ref 9–17)
AST SERPL-CCNC: 28 U/L
BILIRUB SERPL-MCNC: 0.28 MG/DL (ref 0.3–1.2)
BUN BLDV-MCNC: 19 MG/DL (ref 8–23)
CALCIUM SERPL-MCNC: 9.3 MG/DL (ref 8.6–10.4)
CHLORIDE BLD-SCNC: 107 MMOL/L (ref 98–107)
CO2: 21 MMOL/L (ref 20–31)
CREAT SERPL-MCNC: 1.28 MG/DL (ref 0.7–1.2)
CULTURE: NORMAL
EKG ATRIAL RATE: 64 BPM
EKG ATRIAL RATE: 92 BPM
EKG P AXIS: 15 DEGREES
EKG P AXIS: 20 DEGREES
EKG P-R INTERVAL: 184 MS
EKG P-R INTERVAL: 186 MS
EKG Q-T INTERVAL: 390 MS
EKG Q-T INTERVAL: 422 MS
EKG QRS DURATION: 104 MS
EKG QRS DURATION: 114 MS
EKG QTC CALCULATION (BAZETT): 435 MS
EKG QTC CALCULATION (BAZETT): 482 MS
EKG R AXIS: 45 DEGREES
EKG R AXIS: 45 DEGREES
EKG T AXIS: -18 DEGREES
EKG T AXIS: 2 DEGREES
EKG VENTRICULAR RATE: 64 BPM
EKG VENTRICULAR RATE: 92 BPM
ESTIMATED AVERAGE GLUCOSE: 137 MG/DL
GFR AFRICAN AMERICAN: >60 ML/MIN
GFR NON-AFRICAN AMERICAN: 54 ML/MIN
GFR SERPL CREATININE-BSD FRML MDRD: ABNORMAL ML/MIN/{1.73_M2}
GLUCOSE BLD-MCNC: 115 MG/DL (ref 70–99)
HBA1C MFR BLD: 6.4 % (ref 4–6)
HCT VFR BLD CALC: 40.2 % (ref 40.7–50.3)
HEMOGLOBIN: 14 G/DL (ref 13–17)
MAGNESIUM: 2.2 MG/DL (ref 1.6–2.6)
MCH RBC QN AUTO: 32 PG (ref 25.2–33.5)
MCHC RBC AUTO-ENTMCNC: 34.8 G/DL (ref 28.4–34.8)
MCV RBC AUTO: 92 FL (ref 82.6–102.9)
NRBC AUTOMATED: 0 PER 100 WBC
PARTIAL THROMBOPLASTIN TIME: 54.1 SEC (ref 20.5–30.5)
PARTIAL THROMBOPLASTIN TIME: 56.2 SEC (ref 20.5–30.5)
PARTIAL THROMBOPLASTIN TIME: 61.9 SEC (ref 20.5–30.5)
PDW BLD-RTO: 14.4 % (ref 11.8–14.4)
PLATELET # BLD: 187 K/UL (ref 138–453)
PMV BLD AUTO: 9.4 FL (ref 8.1–13.5)
POTASSIUM SERPL-SCNC: 4.2 MMOL/L (ref 3.7–5.3)
RBC # BLD: 4.37 M/UL (ref 4.21–5.77)
SODIUM BLD-SCNC: 141 MMOL/L (ref 135–144)
SPECIMEN DESCRIPTION: NORMAL
TOTAL PROTEIN: 6.7 G/DL (ref 6.4–8.3)
WBC # BLD: 11 K/UL (ref 3.5–11.3)

## 2022-06-08 PROCEDURE — 4A023N7 MEASUREMENT OF CARDIAC SAMPLING AND PRESSURE, LEFT HEART, PERCUTANEOUS APPROACH: ICD-10-PCS | Performed by: INTERNAL MEDICINE

## 2022-06-08 PROCEDURE — 97535 SELF CARE MNGMENT TRAINING: CPT

## 2022-06-08 PROCEDURE — C1894 INTRO/SHEATH, NON-LASER: HCPCS

## 2022-06-08 PROCEDURE — 6370000000 HC RX 637 (ALT 250 FOR IP): Performed by: NURSE PRACTITIONER

## 2022-06-08 PROCEDURE — 94761 N-INVAS EAR/PLS OXIMETRY MLT: CPT

## 2022-06-08 PROCEDURE — C1760 CLOSURE DEV, VASC: HCPCS

## 2022-06-08 PROCEDURE — 6360000004 HC RX CONTRAST MEDICATION

## 2022-06-08 PROCEDURE — B2151ZZ FLUOROSCOPY OF LEFT HEART USING LOW OSMOLAR CONTRAST: ICD-10-PCS | Performed by: INTERNAL MEDICINE

## 2022-06-08 PROCEDURE — C1892 INTRO/SHEATH,FIXED,PEEL-AWAY: HCPCS

## 2022-06-08 PROCEDURE — APPSS30 APP SPLIT SHARED TIME 16-30 MINUTES: Performed by: NURSE PRACTITIONER

## 2022-06-08 PROCEDURE — 97162 PT EVAL MOD COMPLEX 30 MIN: CPT

## 2022-06-08 PROCEDURE — 83036 HEMOGLOBIN GLYCOSYLATED A1C: CPT

## 2022-06-08 PROCEDURE — 6370000000 HC RX 637 (ALT 250 FOR IP): Performed by: STUDENT IN AN ORGANIZED HEALTH CARE EDUCATION/TRAINING PROGRAM

## 2022-06-08 PROCEDURE — 83735 ASSAY OF MAGNESIUM: CPT

## 2022-06-08 PROCEDURE — 2709999900 HC NON-CHARGEABLE SUPPLY

## 2022-06-08 PROCEDURE — 97166 OT EVAL MOD COMPLEX 45 MIN: CPT

## 2022-06-08 PROCEDURE — 36415 COLL VENOUS BLD VENIPUNCTURE: CPT

## 2022-06-08 PROCEDURE — 97116 GAIT TRAINING THERAPY: CPT

## 2022-06-08 PROCEDURE — 80053 COMPREHEN METABOLIC PANEL: CPT

## 2022-06-08 PROCEDURE — 93454 CORONARY ARTERY ANGIO S&I: CPT

## 2022-06-08 PROCEDURE — 2580000003 HC RX 258: Performed by: INTERNAL MEDICINE

## 2022-06-08 PROCEDURE — 1200000000 HC SEMI PRIVATE

## 2022-06-08 PROCEDURE — 85730 THROMBOPLASTIN TIME PARTIAL: CPT

## 2022-06-08 PROCEDURE — B2111ZZ FLUOROSCOPY OF MULTIPLE CORONARY ARTERIES USING LOW OSMOLAR CONTRAST: ICD-10-PCS | Performed by: INTERNAL MEDICINE

## 2022-06-08 PROCEDURE — 85027 COMPLETE CBC AUTOMATED: CPT

## 2022-06-08 PROCEDURE — 6360000002 HC RX W HCPCS

## 2022-06-08 PROCEDURE — 2580000003 HC RX 258: Performed by: STUDENT IN AN ORGANIZED HEALTH CARE EDUCATION/TRAINING PROGRAM

## 2022-06-08 PROCEDURE — 99232 SBSQ HOSP IP/OBS MODERATE 35: CPT | Performed by: INTERNAL MEDICINE

## 2022-06-08 RX ORDER — SODIUM CHLORIDE 0.9 % (FLUSH) 0.9 %
5-40 SYRINGE (ML) INJECTION EVERY 12 HOURS SCHEDULED
Status: DISCONTINUED | OUTPATIENT
Start: 2022-06-08 | End: 2022-06-16 | Stop reason: HOSPADM

## 2022-06-08 RX ORDER — SODIUM CHLORIDE 0.9 % (FLUSH) 0.9 %
5-40 SYRINGE (ML) INJECTION PRN
Status: DISCONTINUED | OUTPATIENT
Start: 2022-06-08 | End: 2022-06-16 | Stop reason: HOSPADM

## 2022-06-08 RX ORDER — SODIUM CHLORIDE 9 MG/ML
INJECTION, SOLUTION INTRAVENOUS PRN
Status: DISCONTINUED | OUTPATIENT
Start: 2022-06-08 | End: 2022-06-16 | Stop reason: HOSPADM

## 2022-06-08 RX ORDER — ISOSORBIDE MONONITRATE 30 MG/1
30 TABLET, EXTENDED RELEASE ORAL DAILY
Status: DISCONTINUED | OUTPATIENT
Start: 2022-06-08 | End: 2022-06-16 | Stop reason: HOSPADM

## 2022-06-08 RX ORDER — SODIUM CHLORIDE 9 MG/ML
INJECTION, SOLUTION INTRAVENOUS CONTINUOUS
Status: ACTIVE | OUTPATIENT
Start: 2022-06-08 | End: 2022-06-09

## 2022-06-08 RX ORDER — ACETAMINOPHEN 325 MG/1
650 TABLET ORAL EVERY 4 HOURS PRN
Status: DISCONTINUED | OUTPATIENT
Start: 2022-06-08 | End: 2022-06-08 | Stop reason: SDUPTHER

## 2022-06-08 RX ORDER — HYDRALAZINE HYDROCHLORIDE 20 MG/ML
10 INJECTION INTRAMUSCULAR; INTRAVENOUS EVERY 10 MIN PRN
Status: DISCONTINUED | OUTPATIENT
Start: 2022-06-08 | End: 2022-06-10

## 2022-06-08 RX ADMIN — ENTACAPONE 200 MG: 200 TABLET, FILM COATED ORAL at 10:17

## 2022-06-08 RX ADMIN — CARBIDOPA AND LEVODOPA 1 TABLET: 25; 100 TABLET ORAL at 20:52

## 2022-06-08 RX ADMIN — PANTOPRAZOLE SODIUM 40 MG: 40 TABLET, DELAYED RELEASE ORAL at 06:13

## 2022-06-08 RX ADMIN — ROPINIROLE HYDROCHLORIDE 0.25 MG: 0.25 TABLET, FILM COATED ORAL at 14:34

## 2022-06-08 RX ADMIN — METOPROLOL TARTRATE 12.5 MG: 25 TABLET ORAL at 21:21

## 2022-06-08 RX ADMIN — ALLOPURINOL 100 MG: 100 TABLET ORAL at 10:17

## 2022-06-08 RX ADMIN — ISOSORBIDE MONONITRATE 30 MG: 30 TABLET ORAL at 20:52

## 2022-06-08 RX ADMIN — ROPINIROLE HYDROCHLORIDE 0.25 MG: 0.25 TABLET, FILM COATED ORAL at 20:52

## 2022-06-08 RX ADMIN — ENTACAPONE 200 MG: 200 TABLET, FILM COATED ORAL at 20:52

## 2022-06-08 RX ADMIN — CARBIDOPA AND LEVODOPA 1 TABLET: 25; 100 TABLET ORAL at 10:17

## 2022-06-08 RX ADMIN — SODIUM CHLORIDE, PRESERVATIVE FREE 10 ML: 5 INJECTION INTRAVENOUS at 20:52

## 2022-06-08 RX ADMIN — AMITRIPTYLINE HYDROCHLORIDE 10 MG: 10 TABLET, FILM COATED ORAL at 20:52

## 2022-06-08 RX ADMIN — ATORVASTATIN CALCIUM 40 MG: 80 TABLET, FILM COATED ORAL at 20:52

## 2022-06-08 RX ADMIN — CLOPIDOGREL 75 MG: 75 TABLET, FILM COATED ORAL at 10:17

## 2022-06-08 RX ADMIN — ROPINIROLE HYDROCHLORIDE 0.25 MG: 0.25 TABLET, FILM COATED ORAL at 10:17

## 2022-06-08 RX ADMIN — ENTACAPONE 200 MG: 200 TABLET, FILM COATED ORAL at 14:34

## 2022-06-08 RX ADMIN — CARBIDOPA AND LEVODOPA 1 TABLET: 25; 100 TABLET ORAL at 14:34

## 2022-06-08 RX ADMIN — SODIUM CHLORIDE: 9 INJECTION, SOLUTION INTRAVENOUS at 16:28

## 2022-06-08 RX ADMIN — MAGNESIUM GLUCONATE 500 MG ORAL TABLET 400 MG: 500 TABLET ORAL at 10:17

## 2022-06-08 ASSESSMENT — PAIN SCALES - GENERAL
PAINLEVEL_OUTOF10: 0

## 2022-06-08 NOTE — PROGRESS NOTES
Physical Therapy  Facility/Department: [facility] 2  Physical Therapy Initial Assessment    Name: Mckenzie Blair  : 1940  MRN: [id number]  Date of Service: 2022  Chief Complaint   Patient presents with    Chest Pain      Discharge Recommendations:  Patient would benefit from continued therapy after discharge   PT Equipment Recommendations  Equipment Needed: No (Pt reports owning a RW, writer recommends  use of RW)      Patient Diagnosis(es): The encounter diagnosis was NSTEMI (non-ST elevated myocardial infarction) ([facility] [address] 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 Past Surgical History:  has a past surgical history that includes Colonoscopy (2012); shoulder surgery (Right); pr colsc flx w/rmvl of tumor polyp lesion snare tq (N/A, 2017); and Colonoscopy (2017). Assessment   Body Structures, Functions, Activity Limitations Requiring Skilled Therapeutic Intervention: Decreased functional mobility ; Decreased strength;Decreased endurance;Decreased balance; Increased pain  Assessment: Pt with mobility deficits requiring CGA to ambulate 55 feet with a SPC. Pt unsteady with ambulation this date and is a fall risk secondary to impaired standing balance, decreased BLE strength and coordination. Pt will require 24 hour assistance with mobility upon discharge. Pt would benefit from additional therapy upon discharge to address aforementioned deficits.   Therapy Prognosis: Good  Decision Making: Medium Complexity  Requires PT Follow-Up: Yes  Activity Tolerance  Activity Tolerance: Patient limited by fatigue;Patient limited by endurance     Plan   Plan  Plan:  (5-6x/week)  Current Treatment Recommendations: Strengthening,Balance training,Functional mobility training,Transfer training,Gait training,Stair training,Patient/Caregiver education & training,Safety education & training,Home exercise program,Equipment evaluation, education, & procurement,Therapeutic activities,Endurance training  Safety Devices  Type of Devices: Gait belt,Call light within reach,Left in chair,Chair alarm in place,Nurse notified  Restraints  Restraints Initially in Place: No     Restrictions  Restrictions/Precautions  Restrictions/Precautions: Contact Precautions  Required Braces or Orthoses?: No     Subjective   General  Patient assessed for rehabilitation services?: Yes  Response To Previous Treatment: Not applicable  Family / Caregiver Present: No  Follows Commands: Within Functional Limits  Subjective  Subjective: Pt supine in bed and agreeable to therapy, RN agreeable to therapy. Pt pleasant and cooperative throughout. Pt complains of 8/10 pain in left knee.          Social/Functional History  Social/Functional History  Lives With: Alone  Type of Home: House  Home Layout: Two level,Able to Live on Main level with bedroom/bathroom,Laundry in basement  Home Access: Stairs to enter with rails  Entrance Stairs - Number of Steps: 5  Entrance Stairs - Rails: Both  Bathroom Shower/Tub: Walk-in shower  Bathroom Toilet: Standard  Bathroom Equipment: Shower chair  Home Equipment: Cane,Walker, rolling (pt reports using SPC at a baseline.)  ADL Assistance: [address]: Independent  Homemaking Responsibilities: Yes  Ambulation Assistance: Independent  Transfer Assistance: Independent  Active : Yes  Mode of Transportation: [facility]  Occupation: Retired  Type of Occupation:   Leisure & Hobbies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  Additional Comments: pt reports that his neighbors are helpful and able to assist if needed. Vision/Hearing  Vision  Vision: Impaired  Vision Exceptions: Wears glasses at all times  Hearing  Hearing: Within functional limits    Cognition   Cognition  Overall Cognitive Status: WFL     Objective               AROM RLE (degrees)  RLE AROM: WFL  AROM LLE (degrees)  LLE AROM : WFL  AROM RUE (degrees)  RUE AROM : WFL  AROM LUE (degrees)  LUE AROM : WFL  Strength RLE  Strength RLE: WFL  Comment: Grossly 4/5. Strength LLE  Strength LLE: WFL  Comment: Grossly 4/5. Strength RUE  Comment: Co-eval with OT, see OT note for UE detail. Strength LUE  Comment: Co-eval with OT, see OT note for UE detail. Bed mobility  Supine to Sit: Contact guard assistance  Sit to Supine:  (pt retired up to a chair at the conclusion of today's session.)  Scooting: Contact guard assistance  Bed Mobility Comments: HOB elevated ~50 degrees, increased time/effort to perform. Transfers  Sit to Stand: Contact guard assistance  Stand to sit: Contact guard assistance  Ambulation  Surface: level tile  Device: Single point cane  Assistance: Contact guard assistance  Quality of Gait: unsteady, decreased gait speed, decreased stride length, no true LOB.   Gait Deviations: Slow Ewa;Decreased step length;Decreased step height  Distance: 55 feet  More Ambulation?: No  Stairs/Curb  Stairs?: No     Balance  Posture: Fair  Sitting - Static: Good  Sitting - Dynamic: Good;-  Standing - Static: Fair  Standing - Dynamic: Fair;-  Comments: standing balance assessed while using a [facility]                                                        AM-PAC Score  AM-PAC Inpatient Mobility Raw Score : 18 (06/08/22 1326)  AM-PAC Inpatient T-Scale Score : 43.63 (06/08/22 1326)  Mobility Inpatient CMS 0-100% Score: 46.58 (06/08/22 1326)  Mobility Inpatient CMS G-Code Modifier : CK (06/08/22 1326)          Goals  Short Term Goals  Time Frame for Short term goals: 14 visits  Short term goal 1: Pt will ambulate 300 feet with least restrictive device and supervision to increase functional independence. Short term goal 2: Pt will demonstrate good- standing balance to decrease fall risk. Short term goal 3: Pt will perform sit<>stand transfer with supervision to increase functional independence. Short term goal 4: Pt will tolerate a 35 minute therapy session to promote increased endurance. Short term goal 5: Pt will negotiate 5 stairs with bilateral handrails and SBA to allow the pt to enter prior living arrangements. Education  Patient Education  Education Given To: Patient  Education Provided: Role of Therapy;Transfer Training;Equipment;Plan of Care; Fall Prevention Strategies  Education Method: Verbal  Barriers to Learning: None  Education Outcome: Verbalized understanding      Therapy Time   Individual Concurrent Group Co-treatment   Time In 0924         Time Out 1001         Minutes 37         Timed Code Treatment Minutes: 8 Minutes       Edelmira Ceron PT
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
## 2022-06-08 NOTE — OP NOTE
[facility]  Cardiac catheterization note        Date:   6/8/2022  Patient name: Aj Caba  Date of admission:  6/7/2022  9:22 AM  MRN:   [id number]  YOB: 1940    Operators:    Anne-Marie Wilson MD (Attending Physician)       Pre Procedure Conscious Sedation Data:    ASA Class:    [] I [x] II [] III [] IV    Mallampati Class:  [] I [x] II [] III [] IV      Indication:     - Recurrent angina in known coronary disease     -  Prior PCI with stent     Procedure:     1. Left heart catheterization   2. Selective left and right coronary angiography       Access:  [x] Femoral  [] Radial  artery       [x] Right  [] Left    Procedure: After informed consent was obtained with explanation of the risks and benefits, patient was brought to the cath lab. The access area was prepped and draped in sterile fashion. 1% lidocaine was used for local block. The artery was cannulated with 6  Fr sheath with brisk arterial blood return. The side port was frequently flushed and aspirated with normal saline. Findings:        LMCA: Normal 0% stenosis. LAD: Patent stent in ostial-proximal area with 10-20% in stent restenosis   Mid to distal vessel is small with diffuse 40% stenosis, somewhat improved   after IC nitro. LCx: Mild irregularities 10-20%. RCA: Mild irregularities 10-20%. Conclusions:     1. Single vessel coronary artery disease.    2. Patent stent in ostial LAD    3. Mild Nonobstructive CAD otherwise    4. LV gram not done due to chronic renal insufficiency       Recommendation  Routine Post Diagnostic Cath orders. Medical therapy as needed. Add Imdur.    Risk factor modification    EBL: < 10 cc           Yary Marks MD, Debbie Pulliam

## 2022-06-08 NOTE — PLAN OF CARE
Problem: Discharge Planning  Goal: Discharge to home or other facility with appropriate resources  Outcome: Progressing  Flowsheets (Taken 6/8/2022 0800)  Discharge to home or other facility with appropriate resources:   Identify barriers to discharge with patient and caregiver   Arrange for needed discharge resources and transportation as appropriate   Identify discharge learning needs (meds, wound care, etc)   Arrange for interpreters to assist at discharge as needed   Refer to discharge planning if patient needs post-hospital services based on physician order or complex needs related to functional status, cognitive ability or social support system     Problem: Pain  Goal: Verbalizes/displays adequate comfort level or baseline comfort level  Outcome: Progressing  Patient reports adequate comfort level. Patient denies pain     Problem: Chronic Conditions and Co-morbidities  Goal: Patient's chronic conditions and co-morbidity symptoms are monitored and maintained or improved  Outcome: Progressing  Flowsheets (Taken 6/8/2022 0800)  Care Plan - Patient's Chronic Conditions and Co-Morbidity Symptoms are Monitored and Maintained or Improved:   Monitor and assess patient's chronic conditions and comorbid symptoms for stability, deterioration, or improvement   Collaborate with multidisciplinary team to address chronic and comorbid conditions and prevent exacerbation or deterioration   Update acute care plan with appropriate goals if chronic or comorbid symptoms are exacerbated and prevent overall improvement and discharge     Problem: Safety - Adult  Goal: Free from fall injury  Outcome: Progressing  Patient free from falls at this time. Patient alert and oriented and uses call light appropriately. Call light within reach. Problem: ABCDS Injury Assessment  Goal: Absence of physical injury  Outcome: Progressing   Patient free from injury at this time. Door is open to room and patient is rounded on frequently.

## 2022-06-08 NOTE — CONSULTS
(LIPITOR) 40 MG tablet take 1 tablet by mouth once daily 5/17/22   JW Walker CNP   azithromycin (ZITHROMAX) 250 MG tablet Take 2 tabs (500 mg) on Day 1, and take 1 tab (250 mg) on days 2 through 5. Patient not taking: Reported on 6/6/2022 5/11/22   JW Walker CNP   diclofenac sodium (VOLTAREN) 1 % GEL Apply 2 g topically 2 times daily as needed for Pain (joint pain) 5/9/22   JW Neumann NP   amitriptyline (ELAVIL) 10 MG tablet Take 1 tablet by mouth nightly 4/5/22   Praful Shane MD   esomeprazole (NEXIUM) 20 MG delayed release capsule TAKE 1 CAPSULE BY MOUTH ONCE DAILY IN THE MORNING BEFORE BREAKFAST 2/23/22   JW Walker CNP   rOPINIRole (REQUIP) 0.25 MG tablet TAKE ONE (1) TABLET BY MOUTH THREE (3) TIMES DAILY 2/23/22   JW Walker CNP   carbidopa-levodopa (SINEMET)  MG per tablet TAKE 1 TABLET BY MOUTH 4 TIMES DAILY 12/3/21 3/18/22  Erwin PipesJW CNP   clopidogrel (PLAVIX) 75 MG tablet TAKE 1 TABLET BY MOUTH ONCE DAILY  11/5/21   [facility] PipesJW CNP   allopurinol (ZYLOPRIM) 100 MG tablet TAKE 1 TABLET BY MOUTH ONCE DAILY  11/5/21   [facility] PipesJW CNP   nitroGLYCERIN (NITROSTAT) 0.4 MG SL tablet Place 1 tablet under the tongue every 5 minutes as needed for Chest pain If no relief of chest pain after 3rd dose, go to the ER or call 911 6/14/21   JW Walker CNP   acetaminophen (TYLENOL 8 HOUR ARTHRITIS PAIN) 650 MG extended release tablet Take 1 tablet by mouth every 8 hours as needed for Pain 6/14/21   JW Walker CNP   butalbital-acetaminophen-caffeine (FIORICET, ESGIC) -40 MG per tablet Take 1 tab prn only for severe headache. Can repeat after 4 hrs if needed. Max 2 tabs/24 hrs.  Max 4 tabs/week 6/14/21   JW Walker - CNP      Current Facility-Administered Medications: heparin (porcine) injection 4,000 Units, 4,000 Units, IntraVENous, PRN  heparin (porcine) injection 2,000 Units, 2,000 Units, IntraVENous, PRN  heparin 25,000 units in dextrose 5 % 250 mL infusion (rate based), 5-30 Units/kg/hr, IntraVENous, Continuous  allopurinol (ZYLOPRIM) tablet 100 mg, 100 mg, Oral, Daily  amitriptyline (ELAVIL) tablet 10 mg, 10 mg, Oral, Nightly  atorvastatin (LIPITOR) tablet 40 mg, 40 mg, Oral, Nightly  carbidopa-levodopa (SINEMET)  MG per tablet 1 tablet, 1 tablet, Oral, 4x Daily  clopidogrel (PLAVIX) tablet 75 mg, 75 mg, Oral, Daily  entacapone (COMTAN) tablet 200 mg, 200 mg, Oral, 4x Daily  pantoprazole (PROTONIX) tablet 40 mg, 40 mg, Oral, QAM AC  magnesium oxide (MAG-OX) tablet 400 mg, 400 mg, Oral, Daily  rOPINIRole (REQUIP) tablet 0.25 mg, 0.25 mg, Oral, TID  sodium chloride flush 0.9 % injection 5-40 mL, 5-40 mL, IntraVENous, 2 times per day  sodium chloride flush 0.9 % injection 10 mL, 10 mL, IntraVENous, PRN  0.9 % sodium chloride infusion, , IntraVENous, PRN  potassium chloride (KLOR-CON M) extended release tablet 40 mEq, 40 mEq, Oral, PRN **OR** potassium bicarb-citric acid (EFFER-K) effervescent tablet 40 mEq, 40 mEq, Oral, PRN **OR** potassium chloride 10 mEq/100 mL IVPB (Peripheral Line), 10 mEq, IntraVENous, PRN  magnesium sulfate 1000 mg in dextrose 5% 100 mL IVPB, 1,000 mg, IntraVENous, PRN  ondansetron (ZOFRAN-ODT) disintegrating tablet 4 mg, 4 mg, Oral, Q8H PRN **OR** ondansetron (ZOFRAN) injection 4 mg, 4 mg, IntraVENous, Q6H PRN  acetaminophen (TYLENOL) tablet 650 mg, 650 mg, Oral, Q6H PRN **OR** acetaminophen (TYLENOL) suppository 650 mg, 650 mg, Rectal, Q6H PRN  magnesium hydroxide (MILK OF MAGNESIA) 400 MG/5ML suspension 30 mL, 30 mL, Oral, Daily PRN  metoprolol tartrate (LOPRESSOR) tablet 12.5 mg, 12.5 mg, Oral, BID  nitroGLYCERIN (NITROSTAT) SL tablet 0.4 mg, 0.4 mg, SubLINGual, Q5 Min PRN      Allergies:  Dye [iodides] and Aspirin      Social History:   reports that he has quit smoking.  He has never used smokeless tobacco. He reports that he does not drink alcohol and does not use drugs. Family History: family history includes No Known Problems in his father and mother. No h/o sudden cardiac death. REVIEW OF SYSTEMS:    Constitutional: there has been no unanticipated weight loss. Eyes: No visual changes or diplopia. ENT: No Headaches  Cardiovascular: see above  Respiratory: No previous pulmonary problems, No cough  Gastrointestinal: No abdominal pain. No change in bowel or bladder habits. Genitourinary: No dysuria, trouble voiding, or hematuria. Musculoskeletal:  No gait disturbance, No weakness or joint complaints. Integumentary: No rash or pruritis. Neurological: No headache, diplopia      PHYSICAL EXAM:      /60   Pulse (!) 46   Temp 97.7 °F (36.5 °C) (Oral)   Resp 15   Ht 5' 7\" (1.702 m)   Wt 170 lb 6.7 oz (77.3 kg)   SpO2 96%   BMI 26.69 kg/m²    Constitutional and General Appearance: Alert, no distress  Respiratory:  No increased work of breathing. Clear to auscultation bilaterally. No wheeze or crackles. Cardiovascular:  Normal S1 and S2.   Jugular venous pulsation Normal  Abdomen:   Soft  No tenderness  Extremities:  No lower extremity edema  Neurologic:  Alert and oriented. Moves all extremities well      DATA:    Diagnostics:    Labs:     CBC:   Recent Labs     06/07/22  0937 06/08/22  0627   WBC 17.8* 11.0   HGB 14.1 14.0   HCT 41.6 40.2*    187     BMP:   Recent Labs     06/07/22  0937 06/08/22  0627   * 141   K 4.2 4.2   CO2 18* 21   BUN 17 19   CREATININE 1.32* 1.28*   LABGLOM 52* 54*   GLUCOSE 224* 115*     BNP: No results for input(s): BNP in the last 72 hours. PT/INR:   Recent Labs     06/07/22  0937   PROTIME 10.5   INR 1.0     APTT:  Recent Labs     06/08/22  0115 06/08/22  0547   APTT 61.9* 56.2*     CARDIAC ENZYMES:  Recent Labs     06/07/22  1039 06/07/22  1647 06/07/22  1840   TROPHS 54* 51* 44*     No results for input(s): CKTOTAL, CKMB, CKMBINDEX, TROPONINI in the last 72 hours.     Invalid input(s):  TROPONINT  No results for input(s): TROPONINT in the last 72 hours. FASTING LIPID PANEL:  Lab Results   Component Value Date    HDL 51 06/07/2022    LDLCALC 58 01/08/2018    TRIG 58 06/07/2022     LIVER PROFILE:  Recent Labs     06/07/22  0937 06/08/22  0627   AST 30 28   ALT 23 21   LABALBU 4.3 3.9         EKG: incomplete RBBB, non-specific ST    ECHO 9/2/2021  Left ventricle is normal in size. Mild left ventricular hypertrophy. Global left ventricular systolic function is normal. Estimated LV EF 60-65  %. Grade I (mild) left ventricular diastolic dysfunction. Mild aortic insufficiency. Mild mitral regurgitation. Mild tricuspid regurgitation. Mild pulmonary hypertension. Estimated right ventricular systolic pressure  is 42VKGZ. 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 HTN  HLP   SDH in 2011  Elevated troponin 54 --> 44 (previous 27)  DM  Leukocytosis          RECOMMENDATIONS:  Start aspirin  Start lipitor  Continue heparin gtt  Will discuss stress test vs cardiac cath. Keep NPO        Thank you for allowing us to participate in [address] [place] [address]. Will follow with you. Electronically signed on 06/08/22 at 9:25 AM by:    Cate Celestin MD, MD   Fellow, [address]      I reviewed the patient history personally, and examined by me during the visit. I have reviewed the H&P/Consult / Progress note as completed, and made appropriate changes to the patient exam and treatment plans.       I have reviewed the case in details including physical exam and treatment plan with resident / fellow / NP    Patient treatment plan was explained to patient, correction in notes was made as appropriate, and discussed final arrangement based on  my evaluation and exam.    Additional Recommendations:      Nneka Syed MD  [facility]

## 2022-06-08 NOTE — PROGRESS NOTES
Occupational Therapy  Facility/Department: [facility]  Occupational Therapy Initial Assessment    Name: Alf Lara  : 1940  MRN: [id number]  Date of Service: 2022    Discharge Recommendations:  Patient would benefit from continued therapy after discharge  OT Equipment Recommendations  Equipment Needed: Yes  Mobility Devices: ADL Assistive Devices  ADL Assistive Devices: Reacher       Patient Diagnosis(es): The encounter diagnosis was NSTEMI (non-ST elevated myocardial infarction) ([facility] [address] 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 ([address] CKD (chronic kidney disease) stage 2, GFR 60-89 ml/min, CKD (chronic kidney disease) stage 3, GFR 30-59 ml/min (HCC), Diverticulosis of colon, GERD (gastroesophageal reflux disease), Impaired renal function, MRSA (methicillin resistant staph aureus) culture positive, Osteoarthritis of knee, Parkinson disease ([facility] [address] Proteinuria, Skull fracture ([facility] [address] Temporary loss of eyesight, and Tubular adenoma of colon. Past Surgical History:  has a past surgical history that includes Colonoscopy (2012); shoulder surgery (Right); pr colsc flx w/rmvl of tumor polyp lesion snare tq (N/A, 2017); and Colonoscopy (2017). Assessment   Performance deficits / Impairments: Decreased functional mobility ; Decreased ADL status; Decreased endurance;Decreased balance;Decreased high-level IADLs;Decreased coordination  Assessment: Pt agreeable to OT eval this date. Pt completed functional transfers/mobility and dynamic standing tasks with CGA and str. cane use this date. Pt engaged in UB ADLs and LB ADLs while seated and standing; see below for specifics regarding levels of assistance. Pt demonstrates moderate balance and mild endurance deficits and will benefit from continued OT services throughout hospitalization for improved independence and safety with ADLs/IADLs and functional transfers/mobility.   Prognosis: Good  Decision Making: Medium Complexity  REQUIRES OT FOLLOW-UP: Yes  Activity Tolerance  Activity Tolerance: Patient Tolerated treatment well        Plan   Plan  Times per Week: 3-4 x/wk  Current Treatment Recommendations: Balance training,Functional mobility training,Endurance training,Safety education & training,Patient/Caregiver education & training,Equipment evaluation, education, & procurement,Self-Care / ADL,Home management training     Restrictions  Restrictions/Precautions  Restrictions/Precautions: Contact Precautions  Required Braces or Orthoses?: No    Subjective   General  Patient assessed for rehabilitation services?: Yes  Family / Caregiver Present: No  General Comment  Comments: RN ok'd pt for OT eval this date. Pt agreeable to session and pleasant/cooperative throughout. Pt reports 8/10 pain L knee, chronic     Social/Functional History  Social/Functional History  Lives With: Alone  Type of Home: House  Home Layout: Two level,Able to Live on Main level with bedroom/bathroom,Laundry in basement  Home Access: Stairs to enter with rails  Entrance Stairs - Number of Steps: 5  Entrance Stairs - Rails: Both  Bathroom Shower/Tub: Walk-in shower  Bathroom Toilet: Standard  Bathroom Equipment: Shower chair  Home Equipment: Cane,Walker, rolling (pt reports using SPC at a baseline.)  ADL Assistance: Independent  Homemaking Assistance: Independent  Homemaking Responsibilities: Yes  Ambulation Assistance: Independent  Transfer Assistance: Independent  Active : Yes  Mode of Transportation: SUV  Occupation: Retired  Type of Occupation:   Leisure & Hobbies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  Additional Comments: pt reports that neighbors are helpful and able to assist if needed. Objective   Vision Exceptions: Wears glasses at all times  Hearing: Within functional limits         Safety Devices  Type of Devices: Gait belt;Call light within reach; Left in chair;Chair alarm in place;Nurse notified  Restraints  Restraints completed simulated LB dressing task of doffing/donning socks while seated with SBA, CGA expected for standing portions of LB dressing)  Toileting: Contact guard assistance;Setup    Activity Tolerance  Activity Tolerance: Patient limited by fatigue;Patient limited by endurance          Cognition  Overall Cognitive Status: [facility]                 Education Provided Comments: Pt ed on OT role, OT POC, safety awareness, transfer training, DME use, fall prevention, and importance of continued OT. Pt verbalized good understanding     LUE AROM (degrees)  LUE AROM : WFL  Left Hand AROM (degrees)  Left Hand AROM: WFL  RUE AROM (degrees)  RUE AROM : WFL  Right Hand AROM (degrees)  Right Hand AROM: WFL       Hand Dominance  Hand Dominance: Right            AM-PAC Score  AM-PAC Inpatient Daily Activity Raw Score: 20 (06/08/22 1553)  AM-PAC Inpatient ADL T-Scale Score : 42.03 (06/08/22 1553)  ADL Inpatient CMS 0-100% Score: 38.32 (06/08/22 1553)  ADL Inpatient CMS G-Code Modifier : CJ (06/08/22 1553)    Goals  Short Term Goals  Time Frame for Short term goals: By discharge, pt will:  Short Term Goal 1: Demo Mod I for functional transfers and functioanl mobility with use of LRD PRN for engagement in ADLs/IADLs  Short Term Goal 2: Demo I for UB ADLs  Short Term Goal 3: Demo Mod I for LB ADLs and toileting tasks  Short Term Goal 4: Demo 8+ min dynamic standing and reaching outside [place] in multiple planes with 0 LOB and SUP for improved balance during ADLs/IADLs  Short Term Goal 5:  Increase activity tolerance to 35+ min for improved participation in ADLs/IADLs       Therapy Time   Individual Concurrent Group Co-treatment   Time In 0924         Time Out 1015         Minutes 51         Timed Code Treatment Minutes: 1629 India Rodriguez OTR/L

## 2022-06-09 PROBLEM — I21.4 NSTEMI (NON-ST ELEVATED MYOCARDIAL INFARCTION) (HCC): Status: RESOLVED | Noted: 2018-05-16 | Resolved: 2022-06-09

## 2022-06-09 PROBLEM — E87.20 LACTIC ACIDOSIS: Status: RESOLVED | Noted: 2022-06-07 | Resolved: 2022-06-09

## 2022-06-09 LAB
ALBUMIN SERPL-MCNC: 3.4 G/DL (ref 3.5–5.2)
ALBUMIN/GLOBULIN RATIO: 1.3 (ref 1–2.5)
ALP BLD-CCNC: 87 U/L (ref 40–129)
ALT SERPL-CCNC: 17 U/L (ref 5–41)
ANION GAP SERPL CALCULATED.3IONS-SCNC: 10 MMOL/L (ref 9–17)
AST SERPL-CCNC: 31 U/L
BILIRUB SERPL-MCNC: 0.3 MG/DL (ref 0.3–1.2)
BUN BLDV-MCNC: 22 MG/DL (ref 8–23)
CALCIUM SERPL-MCNC: 8.7 MG/DL (ref 8.6–10.4)
CHLORIDE BLD-SCNC: 103 MMOL/L (ref 98–107)
CO2: 21 MMOL/L (ref 20–31)
CREAT SERPL-MCNC: 1.51 MG/DL (ref 0.7–1.2)
GFR AFRICAN AMERICAN: 54 ML/MIN
GFR NON-AFRICAN AMERICAN: 44 ML/MIN
GFR SERPL CREATININE-BSD FRML MDRD: ABNORMAL ML/MIN/{1.73_M2}
GLUCOSE BLD-MCNC: 224 MG/DL (ref 70–99)
HCT VFR BLD CALC: 37.5 % (ref 40.7–50.3)
HEMOGLOBIN: 13.2 G/DL (ref 13–17)
MCH RBC QN AUTO: 32.6 PG (ref 25.2–33.5)
MCHC RBC AUTO-ENTMCNC: 35.2 G/DL (ref 28.4–34.8)
MCV RBC AUTO: 92.6 FL (ref 82.6–102.9)
NRBC AUTOMATED: 0 PER 100 WBC
PDW BLD-RTO: 14.5 % (ref 11.8–14.4)
PLATELET # BLD: 191 K/UL (ref 138–453)
PMV BLD AUTO: 9.2 FL (ref 8.1–13.5)
POTASSIUM SERPL-SCNC: 4.9 MMOL/L (ref 3.7–5.3)
RBC # BLD: 4.05 M/UL (ref 4.21–5.77)
SODIUM BLD-SCNC: 134 MMOL/L (ref 135–144)
TOTAL PROTEIN: 6 G/DL (ref 6.4–8.3)
WBC # BLD: 7.4 K/UL (ref 3.5–11.3)

## 2022-06-09 PROCEDURE — 6370000000 HC RX 637 (ALT 250 FOR IP): Performed by: STUDENT IN AN ORGANIZED HEALTH CARE EDUCATION/TRAINING PROGRAM

## 2022-06-09 PROCEDURE — 6370000000 HC RX 637 (ALT 250 FOR IP): Performed by: INTERNAL MEDICINE

## 2022-06-09 PROCEDURE — 99232 SBSQ HOSP IP/OBS MODERATE 35: CPT | Performed by: INTERNAL MEDICINE

## 2022-06-09 PROCEDURE — 80053 COMPREHEN METABOLIC PANEL: CPT

## 2022-06-09 PROCEDURE — 97116 GAIT TRAINING THERAPY: CPT

## 2022-06-09 PROCEDURE — 85027 COMPLETE CBC AUTOMATED: CPT

## 2022-06-09 PROCEDURE — 36415 COLL VENOUS BLD VENIPUNCTURE: CPT

## 2022-06-09 PROCEDURE — 2580000003 HC RX 258: Performed by: STUDENT IN AN ORGANIZED HEALTH CARE EDUCATION/TRAINING PROGRAM

## 2022-06-09 PROCEDURE — 1200000000 HC SEMI PRIVATE

## 2022-06-09 PROCEDURE — 94761 N-INVAS EAR/PLS OXIMETRY MLT: CPT

## 2022-06-09 PROCEDURE — APPSS45 APP SPLIT SHARED TIME 31-45 MINUTES: Performed by: NURSE PRACTITIONER

## 2022-06-09 PROCEDURE — 97110 THERAPEUTIC EXERCISES: CPT

## 2022-06-09 RX ORDER — LIDOCAINE 4 G/G
1 PATCH TOPICAL DAILY
Qty: 15 PATCH | Refills: 0 | Status: ON HOLD | OUTPATIENT
Start: 2022-06-09 | End: 2022-06-20 | Stop reason: HOSPADM

## 2022-06-09 RX ORDER — NITROGLYCERIN 0.4 MG/1
TABLET SUBLINGUAL
Qty: 25 TABLET | Refills: 0 | Status: SHIPPED | OUTPATIENT
Start: 2022-06-09

## 2022-06-09 RX ORDER — ISOSORBIDE MONONITRATE 30 MG/1
30 TABLET, EXTENDED RELEASE ORAL DAILY
Qty: 30 TABLET | Refills: 0 | Status: ON HOLD | OUTPATIENT
Start: 2022-06-10 | End: 2022-06-20 | Stop reason: HOSPADM

## 2022-06-09 RX ORDER — LIDOCAINE 4 G/G
1 PATCH TOPICAL DAILY
Status: DISCONTINUED | OUTPATIENT
Start: 2022-06-09 | End: 2022-06-16 | Stop reason: HOSPADM

## 2022-06-09 RX ADMIN — MAGNESIUM GLUCONATE 500 MG ORAL TABLET 400 MG: 500 TABLET ORAL at 07:57

## 2022-06-09 RX ADMIN — ACETAMINOPHEN 650 MG: 325 TABLET ORAL at 09:15

## 2022-06-09 RX ADMIN — SODIUM CHLORIDE, PRESERVATIVE FREE 10 ML: 5 INJECTION INTRAVENOUS at 07:56

## 2022-06-09 RX ADMIN — AMITRIPTYLINE HYDROCHLORIDE 10 MG: 10 TABLET, FILM COATED ORAL at 20:54

## 2022-06-09 RX ADMIN — CARBIDOPA AND LEVODOPA 1 TABLET: 25; 100 TABLET ORAL at 20:54

## 2022-06-09 RX ADMIN — ISOSORBIDE MONONITRATE 30 MG: 30 TABLET ORAL at 07:57

## 2022-06-09 RX ADMIN — ENTACAPONE 200 MG: 200 TABLET, FILM COATED ORAL at 13:33

## 2022-06-09 RX ADMIN — CARBIDOPA AND LEVODOPA 1 TABLET: 25; 100 TABLET ORAL at 13:33

## 2022-06-09 RX ADMIN — ROPINIROLE HYDROCHLORIDE 0.25 MG: 0.25 TABLET, FILM COATED ORAL at 07:57

## 2022-06-09 RX ADMIN — CARBIDOPA AND LEVODOPA 1 TABLET: 25; 100 TABLET ORAL at 07:57

## 2022-06-09 RX ADMIN — ALLOPURINOL 100 MG: 100 TABLET ORAL at 07:57

## 2022-06-09 RX ADMIN — ENTACAPONE 200 MG: 200 TABLET, FILM COATED ORAL at 17:13

## 2022-06-09 RX ADMIN — METOPROLOL TARTRATE 12.5 MG: 25 TABLET ORAL at 07:57

## 2022-06-09 RX ADMIN — ROPINIROLE HYDROCHLORIDE 0.25 MG: 0.25 TABLET, FILM COATED ORAL at 17:13

## 2022-06-09 RX ADMIN — CLOPIDOGREL 75 MG: 75 TABLET, FILM COATED ORAL at 07:57

## 2022-06-09 RX ADMIN — ATORVASTATIN CALCIUM 40 MG: 80 TABLET, FILM COATED ORAL at 20:54

## 2022-06-09 RX ADMIN — ROPINIROLE HYDROCHLORIDE 0.25 MG: 0.25 TABLET, FILM COATED ORAL at 20:54

## 2022-06-09 RX ADMIN — ENTACAPONE 200 MG: 200 TABLET, FILM COATED ORAL at 20:54

## 2022-06-09 RX ADMIN — PANTOPRAZOLE SODIUM 40 MG: 40 TABLET, DELAYED RELEASE ORAL at 07:57

## 2022-06-09 RX ADMIN — METOPROLOL TARTRATE 12.5 MG: 25 TABLET ORAL at 20:54

## 2022-06-09 RX ADMIN — ENTACAPONE 200 MG: 200 TABLET, FILM COATED ORAL at 07:56

## 2022-06-09 RX ADMIN — SODIUM CHLORIDE, PRESERVATIVE FREE 10 ML: 5 INJECTION INTRAVENOUS at 20:55

## 2022-06-09 RX ADMIN — CARBIDOPA AND LEVODOPA 1 TABLET: 25; 100 TABLET ORAL at 17:13

## 2022-06-09 ASSESSMENT — PAIN SCALES - GENERAL
PAINLEVEL_OUTOF10: 7
PAINLEVEL_OUTOF10: 0

## 2022-06-09 ASSESSMENT — PAIN DESCRIPTION - LOCATION: LOCATION: HEAD

## 2022-06-09 NOTE — PROGRESS NOTES
[facility]  Office: [phone]  Homero Vera, DO, Patricia De La Torre, DO, Enrike Lane, DO, Braden White, DO, Ely Farley MD, Tano Davis MD, Oziel Gasca MD, Nicole Fam MD, Mik Bowen MD, Candy Emery MD, Tricia Dalton MD, Saad Carvajal DO, Carolyn Blackman MD,  Sanket Quiñonez DO, Martha Barron MD, Asiya Dunn MD, Pop Roldan MD, Chance Wylie, DO, Rosalba Cruz MD, Erica Sanchez MD, Thomas Espinoza DO, Shayne Kelly MD, Lasha Pink CNP, [facility], CNP, Sonja Pedraza, CNP, Raheem Yip, CNP, Frank Lauren, CNP, Lencho Cannon, CNP, AD YbarraC, Denver Parks, DNP, Esme Horowitz, CNP, Al Meza, CNP, Lois Borges, CNP, Slim Dunlap, CNS, Christopher Sofia, DNP, Shaheed Forbes, CNP, Tapan Orantes, [facility], Magan Ferraro, [address] [place] [address]    Progress Note    6/9/2022    8:13 AM    Name:   Ivelisse Degroot  MRN:     [id number]     Acct:      [de-identified]   Room:   2022/2022-01   Day:  2  Admit Date:  6/7/2022  9:22 AM    PCP:   JW Cortez CNP  Code Status:  Full Code    Subjective:     C/C:   Chief Complaint   Patient presents with    Chest Pain     Interval History Status: not changed. Seen and evaluated in room resting in bed in no acute distress    Vital signs stable overnight  Cardiac cath completed on 6/8 without significant occlusion  Medical therapy was optimized  Reproducible chest pain noted, patient tolerating lidocaine patch well  Stable for discharge    Brief History: This is an 76-year-old male with a past medical history listed below who presents to the emergency department with a chief complaint of sharp chest pain 7-8/10 radiating to his back with shortness of breath and dizziness. Not current and improved with nitro.   Also endorsing some left knee pain after recent steroid injection with his pain management provider at [facility] pain management clinic on 6/6/2022. Initial troponin was slightly elevated from his baseline at 41 uptrending to 59 without significant EKG changes. Patient was given nitro and started on a heparin infusion in the emergency department and cardiology was consulted, planning cardiac cath on 6/8/2022    Review of Systems:     Constitutional:  negative for chills, fevers, sweats  Respiratory:  negative for cough, dyspnea on exertion, shortness of breath, wheezing  Cardiovascular:  + Reproducible chest pain, chest pressure/discomfort, lower extremity edema, palpitations  Gastrointestinal:  negative for abdominal pain, constipation, diarrhea, nausea, vomiting  Neurological:  negative for dizziness, headache    Medications: Allergies:     Allergies   Allergen Reactions    Dye [Iodides]      pain    Aspirin      Brain bleed         Current Meds:   Scheduled Meds:    sodium chloride flush  5-40 mL IntraVENous 2 times per day    isosorbide mononitrate  30 mg Oral Daily    allopurinol  100 mg Oral Daily    amitriptyline  10 mg Oral Nightly    atorvastatin  40 mg Oral Nightly    carbidopa-levodopa  1 tablet Oral 4x Daily    clopidogrel  75 mg Oral Daily    entacapone  200 mg Oral 4x Daily    pantoprazole  40 mg Oral QAM AC    magnesium oxide  400 mg Oral Daily    rOPINIRole  0.25 mg Oral TID    metoprolol tartrate  12.5 mg Oral BID     Continuous Infusions:    sodium chloride       PRN Meds: sodium chloride flush, sodium chloride, hydrALAZINE, sodium chloride flush, potassium chloride **OR** potassium alternative oral replacement **OR** potassium chloride, magnesium sulfate, ondansetron **OR** ondansetron, acetaminophen **OR** acetaminophen, magnesium hydroxide, nitroGLYCERIN    Data:     Past Medical History:   has a past medical history of 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 Social History:   reports that he has quit smoking. He has never used smokeless tobacco. He reports that he does not drink alcohol and does not use drugs. Family History:   Family History   Problem Relation Age of Onset    No Known Problems Mother     No Known Problems Father        Vitals:  /72   Pulse 59   Temp 98.1 °F (36.7 °C) (Axillary)   Resp 16   Ht 5' 7\" (1.702 m)   Wt 171 lb 4.8 oz (77.7 kg)   SpO2 94%   BMI 26.83 kg/m²   Temp (24hrs), Av.8 °F (36.6 °C), Min:97.5 °F (36.4 °C), Max:98.1 °F (36.7 °C)    No results for input(s): POCGLU in the last 72 hours. I/O (24Hr):     Intake/Output Summary (Last 24 hours) at 2022 0813  Last data filed at 2022 0752  Gross per 24 hour   Intake 1670.38 ml   Output 1700 ml   Net -29.62 ml       Labs:  Hematology:  Recent Labs     22  0922  0251   WBC 17.8* 11.0 7.4   RBC 4.43 4.37 4.05*   HGB 14.1 14.0 13.2   HCT 41.6 40.2* 37.5*   MCV 93.9 92.0 92.6   MCH 31.8 32.0 32.6   MCHC 33.9 34.8 35.2*   RDW 14.2 14.4 14.5*    187 191   MPV 8.9 9.4 9.2   INR 1.0  --   --    DDIMER 0.39  --   --      Chemistry:  Recent Labs     22  0937 22  0937 22  1039 22  1647 22  1840 22  0627 22  0251   *  --   --   --   --  141 134*   K 4.2  --   --   --   --  4.2 4.9     --   --   --   --  107 103   CO2 18*  --   --   --   --  21 21   GLUCOSE 224*  --   --   --   --  115* 224*   BUN 17  --   --   --   --  19 22   CREATININE 1.32*  --   --   --   --  1.28* 1.51*   MG  --   --   --   --   --  2.2  --    ANIONGAP 14  --   --   --   --  13 10   LABGLOM 52*  --   --   --   --  54* 44* GFRAA >60  --   --   --   --  >60 54*   CALCIUM 9.5  --   --   --   --  9.3 8.7   PROBNP 664*  --   --   --   --   --   --    TROPHS 41*   < > 54* 51* 44*  --   --     < > = values in this interval not displayed. Recent Labs     06/07/22  0937 06/07/22  1039 06/08/22  0627 06/09/22  0251   PROT 7.4  --  6.7 6.0*   LABALBU 4.3  --  3.9 3.4*   LABA1C  --   --  6.4*  --    AST 30  --  28 31   ALT 23  --  21 17   ALKPHOS 110  --  92 87   BILITOT 0.33  --  0.28* 0.30   BILIDIR 0.08  --   --   --    CHOL  --  114  --   --    HDL  --  51  --   --    LDLCHOLESTEROL  --  51  --   --    CHOLHDLRATIO  --  2.2  --   --    TRIG  --  58  --   --        Lab Results   Component Value Date/Time    SPECIAL RFA 16ML 06/07/2022 10:46 AM     Lab Results   Component Value Date/Time    CULTURE NO GROWTH 1 DAY 06/07/2022 10:46 AM       Radiology:  XR CHEST PORTABLE    Result Date: 6/7/2022  No acute cardiopulmonary process.        Physical Examination:        General appearance:  alert, cooperative and no distress, resting tremor  Mental Status:  oriented to person, place and time and normal affect  Lungs:  clear to auscultation bilaterally, normal effort  Heart:  regular rate and rhythm, no murmur  Abdomen:  soft, nontender, nondistended, normal bowel sounds  Extremities:  no edema, redness, tenderness in the calves  Skin:  no gross lesions, rashes, induration    Assessment:        Hospital Problems           Last Modified POA    * (Principal) NSTEMI (non-ST elevated myocardial infarction) ([facility] [address] 6/7/2022 Yes    Chronic pain of multiple joints (Chronic) 6/7/2022 Yes    Lactic acidosis 6/7/2022 Yes    Gout 6/7/2022 Yes    CKD (chronic kidney disease) stage 3, GFR 30-59 ml/min ([facility] [address] 6/7/2022 Yes    Depression 6/7/2022 Yes    Hyperlipidemia 6/7/2022 Yes    Essential hypertension 6/7/2022 Yes    Parkinson disease ([address] 6/7/2022 Yes    Chronic pain of left knee 6/7/2022 Yes    Overweight (BMI 25.0-29.9) 6/7/2022 Yes          Plan:    1. NSTEMI:   - Troponin elevation at 41 initially on presentation uptrending to 54.    - No EKG changes  - Cardiac cath completed on 6/8/2022 for nonobstructive CAD  - Patient continues on heparin infusion.    - Patient received nitro  -Patient to be discharged on Plavix, statin, beta-blocker, Imdur, allergy to ASA  -Follow-up with cardiology in 2 weeks    2. Primary hypertension/bradycardia:   - Blood pressure elevated on admission, improving  - BB held on 6/8 due to bradycardia, restarted      3. CKD stage IIIa:   - Currently at baseline  - follow with Dr. Tera Chatterjee     4. DMT2:   -A.m. blood sugar stable  - diet controlled  - check A1c- last one was 6.0     5. History of h/a with previous skull fx/head trauma in 2011:   - Not currently active, continue amitriptyline     6. Dyslipidemia: On moderate intensity statin, continue     7. Parkinson's: On Sinemet with Comtan 4 times daily     8. Leukocytosis:   - resolved  - Recent steroid injection on 6/6/2022 to the left knee with pain management  - BCX2 NGTD  - Check UA normal  - Chest x-ray negative for acute process     9. Chronic pain patient:   - Recently evaluated by pain management on 6/6/2022  - received joint injection to the left knee     10. History of gout: Continue allopurinol     11. GI/DVT prophylaxis: Protonix, heparin gtt        On this date 06/09/22 I have spent 31 mins  in direct patient care, reviewing notes, test results and diagnosis and plan of care discussions with the patient, as well as coordination of care.   Plan of care made with DO Jane Aragon APRN - NP  6/9/2022  8:13 AM

## 2022-06-09 NOTE — PLAN OF CARE
Problem: Discharge Planning  Goal: Discharge to home or other facility with appropriate resources  6/8/2022 2203 by Mary Perez RN  Outcome: Progressing  Flowsheets (Taken 6/8/2022 2000)  Discharge to home or other facility with appropriate resources:   Identify barriers to discharge with patient and caregiver   Arrange for needed discharge resources and transportation as appropriate  6/8/2022 1824 by Magdalena Medel RN  Outcome: Progressing  Flowsheets (Taken 6/8/2022 0800)  Discharge to home or other facility with appropriate resources:   Identify barriers to discharge with patient and caregiver   Arrange for needed discharge resources and transportation as appropriate   Identify discharge learning needs (meds, wound care, etc)   Arrange for interpreters to assist at discharge as needed   Refer to discharge planning if patient needs post-hospital services based on physician order or complex needs related to functional status, cognitive ability or social support system     Problem: Pain  Goal: Verbalizes/displays adequate comfort level or baseline comfort level  6/8/2022 2203 by Mary Perez RN  Outcome: Progressing  6/8/2022 1824 by Magdalena Medel RN  Outcome: Progressing     Problem: Chronic Conditions and Co-morbidities  Goal: Patient's chronic conditions and co-morbidity symptoms are monitored and maintained or improved  6/8/2022 2203 by Mary Perez RN  Outcome: Progressing  Flowsheets (Taken 6/8/2022 2000)  Care Plan - Patient's Chronic Conditions and Co-Morbidity Symptoms are Monitored and Maintained or Improved:   Monitor and assess patient's chronic conditions and comorbid symptoms for stability, deterioration, or improvement   Collaborate with multidisciplinary team to address chronic and comorbid conditions and prevent exacerbation or deterioration  6/8/2022 1824 by Magdalena Medel RN  Outcome: Progressing  Flowsheets (Taken 6/8/2022 0800)  Care Plan - Patient's Chronic Conditions and Co-Morbidity Symptoms are Monitored and Maintained or Improved:   Monitor and assess patient's chronic conditions and comorbid symptoms for stability, deterioration, or improvement   Collaborate with multidisciplinary team to address chronic and comorbid conditions and prevent exacerbation or deterioration   Update acute care plan with appropriate goals if chronic or comorbid symptoms are exacerbated and prevent overall improvement and discharge     Problem: Safety - Adult  Goal: Free from fall injury  6/8/2022 2203 by Barb Carlton RN  Outcome: Progressing  6/8/2022 1824 by Maya Carrizales RN  Outcome: Progressing     Problem: ABCDS Injury Assessment  Goal: Absence of physical injury  6/8/2022 2203 by Brab Carlton RN  Outcome: Progressing  6/8/2022 1824 by Maya Carrizales RN  Outcome: Progressing

## 2022-06-09 NOTE — PROGRESS NOTES
Writer in to d/c pt. IV's and get ready for d/c. Pt. Reports he would like to go to rehab to build his strength as he lives alone. CM updated.

## 2022-06-09 NOTE — PROGRESS NOTES
Writer into room to medicate pt. For h/a. Pt. Reports the burning sensation to shoulders and Rt. Arm along with CP have resolved. He states the feeling comes and goes. NP was just in to see pt. (per pt. /PT) pt. States he did not report symptoms to Dr. As he is not experiencing them at present. Encouraged pt. To report concerns to doctors when they round.

## 2022-06-09 NOTE — PROGRESS NOTES
[facility]   Progress Note                   Date:   6/9/2022  Patient name: Cameron Quick  Date of admission:  6/7/2022  9:22 AM  MRN:   [id number]  YOB: 1940  PCP: JW Perrin CNP    Reason for Admission: NSTEMI (non-ST elevated myocardial infarction) [facility]) [I21.4]    Subjective:       Clinical Changes / Abnormalities: Pt seen and examined in the room. Pt denies any CP or SOB. Medications:   Scheduled Meds:   sodium chloride flush  5-40 mL IntraVENous 2 times per day    isosorbide mononitrate  30 mg Oral Daily    allopurinol  100 mg Oral Daily    amitriptyline  10 mg Oral Nightly    atorvastatin  40 mg Oral Nightly    carbidopa-levodopa  1 tablet Oral 4x Daily    clopidogrel  75 mg Oral Daily    entacapone  200 mg Oral 4x Daily    pantoprazole  40 mg Oral QAM AC    magnesium oxide  400 mg Oral Daily    rOPINIRole  0.25 mg Oral TID    metoprolol tartrate  12.5 mg Oral BID     Continuous Infusions:   sodium chloride       CBC:   Recent Labs     06/07/22  0937 06/08/22  0627 06/09/22  0251   WBC 17.8* 11.0 7.4   HGB 14.1 14.0 13.2    187 191     BMP:    Recent Labs     06/07/22  0937 06/08/22  0627 06/09/22  0251   * 141 134*   K 4.2 4.2 4.9    107 103   CO2 18* 21 21   BUN 17 19 22   CREATININE 1.32* 1.28* 1.51*   GLUCOSE 224* 115* 224*     Hepatic:   Recent Labs     06/07/22  0937 06/08/22  0627 06/09/22  0251   AST 30 28 31   ALT 23 21 17   BILITOT 0.33 0.28* 0.30   ALKPHOS 110 92 87     Troponin:   Recent Labs     06/07/22  1039 06/07/22  1647 06/07/22  1840   TROPHS 54* 51* 44*     BNP: No results for input(s): BNP in the last 72 hours. Lipids:   Recent Labs     06/07/22  1039   CHOL 114   HDL 51     INR:   Recent Labs     06/07/22  0937   INR 1.0     EKG: incomplete RBBB, non-specific ST     ECHO 9/2/2021  Left ventricle is normal in size. Mild left ventricular hypertrophy.   Global left ventricular systolic function is normal. Estimated LV EF 60-65  %. Grade I (mild) left ventricular diastolic dysfunction. Mild aortic insufficiency. Mild mitral regurgitation. Mild tricuspid regurgitation. Mild pulmonary hypertension. Estimated right ventricular systolic pressure  is [id number].     STRESS 9/2/21:  Stress test negative      CARDIAC CATH 6/8/22     Findings:         LMCA: Normal 0% stenosis. LAD: Patent stent in ostial-proximal area with 10-20% in stent restenosis   Mid to distal vessel is small with diffuse 40% stenosis, somewhat improved   after IC nitro. LCx: Mild irregularities 10-20%. RCA: Mild irregularities 10-20%.      Conclusions:      1. Single vessel coronary artery disease.    2. Patent stent in ostial LAD    3. Mild Nonobstructive CAD otherwise    4. LV gram not done due to chronic renal insufficiency         Recommendation  Routine Post Diagnostic Cath orders. Medical therapy as needed. Add Imdur. Risk factor modification  Objective:   Vitals: /67   Pulse 67   Temp 97.8 °F (36.6 °C) (Oral)   Resp 15   Ht 5' 7\" (1.702 m)   Wt 171 lb 4.8 oz (77.7 kg)   SpO2 95%   BMI 26.83 kg/m²   General appearance: alert and cooperative with exam  HEENT: Head: Normocephalic, no lesions, without obvious abnormality. 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  HTN  HLP   SDH in 2011  Elevated troponin 54 --> 44 (previous 27)  DM  Leukocytosis    Patient Active Problem List:     Temporary loss of eyesight     Impaired renal function     Syncope : posterior circulation stroke vs Neurocardiogenic syncope      Intracranial bleed : traumatic left sub-dural hematoma ,managed conservatively in 2011     Headache     CKD (chronic kidney disease) stage 3, GFR 30-59 ml/min ([facility])     Depression     Hyperlipidemia     Microhematuria     Microalbuminuria     Essential hypertension     Generalized abdominal pain     Tubular adenoma of colon     Diverticulosis of colon     History of skull fracture     NSTEMI (non-ST elevated myocardial infarction) ([facility])     Nausea     Pure hypercholesterolemia     Prediabetes     Parkinson disease ([facility])     Chronic post-traumatic headache, not intractable     Fall (on) (from) other stairs and steps, initial encounter     Strain of iliopsoas muscle, initial encounter     Chronic pain of left knee     Closed fracture of second toe of right foot     Closed fracture of second toe of left foot     Abnormal ECG     Cerebrovascular accident ([facility] [address]     Chest pain, unspecified     Hematoma of scalp     Left ventricular hypertrophy     Mild aortic valve regurgitation     Pulmonary hypertension, mild ([facility])     Lumbar radiculopathy     Dizziness     Hyponatremia     Vomiting     General weakness     Overweight (BMI 25.0-29. 9)     Frequent falls     Symptomatic bradycardia     Unspecified inflammatory spondylopathy, lumbar region [facility])     Chronic renal disease, stage III ([facility]) [688250]     Chronic pain of multiple joints     Multiple comorbid conditions     Lactic acidosis     Gout      Plan of Treatment:   1. Nonobstructive CAD per Cardiac cath. Continue plavix, statin, BB, imdur. No ASA due to allergy   2. HTN. Stable. Continue BB/imdur   3.  Ok to d/c from cardiology standpoint and will follow up as outpt in 2 weeks     Electronically signed by JW David CNP on 6/9/2022 at 10:22 [address] [facility].  [phone]

## 2022-06-09 NOTE — PROGRESS NOTES
Physical Therapy  Facility/Department: [facility] 2  Physical Therapy Daily Treatment Note    Name: Mila Youngblood  : 1940  MRN: [id number]  Date of Service: 2022    Discharge Recommendations:  Patient would benefit from continued therapy after discharge   PT Equipment Recommendations  Equipment Needed: No      Patient Diagnosis(es): The encounter diagnosis was NSTEMI (non-ST elevated myocardial infarction) ([facility] [address] 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 ([facility] [address] CKD (chronic kidney disease) stage 2, GFR 60-89 ml/min, CKD (chronic kidney disease) stage 3, GFR 30-59 ml/min ([facility]), Diverticulosis of colon, GERD (gastroesophageal reflux disease), Impaired renal function, MRSA (methicillin resistant staph aureus) culture positive, Osteoarthritis of knee, Parkinson disease ([facility] [address] Proteinuria, Skull fracture ([facility] [address] Temporary loss of eyesight, and Tubular adenoma of colon. Past Surgical History:  has a past surgical history that includes Colonoscopy (2012); shoulder surgery (Right); pr colsc flx w/rmvl of tumor polyp lesion snare tq (N/A, 2017); and Colonoscopy (2017). Assessment   Body Structures, Functions, Activity Limitations Requiring Skilled Therapeutic Intervention: Decreased functional mobility ; Decreased strength;Decreased endurance;Decreased balance; Increased pain  Assessment: Pt amb 35 ft with SPC and up to MIN A. Pt unsteady and high fall risk.  Recommend contiuned PT after d/c to address deficits  Therapy Prognosis: Good  Activity Tolerance  Activity Tolerance: Patient limited by fatigue;Patient limited by endurance     Plan   Plan  Plan:  (5-6x)  Current Treatment Recommendations: Strengthening,Balance training,Functional mobility training,Transfer training,Gait training,Stair training,Patient/Caregiver education & training,Safety education & training,Home exercise program,Equipment evaluation, education, & procurement,Therapeutic activities,Endurance training  Safety Devices  Type of Devices: Gait belt,Call light within reach,Left in chair,Chair alarm in place,Nurse notified  Restraints  Restraints Initially in Place: No     Restrictions  Restrictions/Precautions  Restrictions/Precautions: Contact Precautions  Required Braces or Orthoses?: No     Subjective   General  Patient assessed for rehabilitation services?: Yes  Response To Previous Treatment: Patient with no complaints from previous session. Family / Caregiver Present: No  Follows Commands: Within Functional Limits  Subjective  Subjective: Pt resting in bed upon arrival, agreeable to PT, pleasant and coopetive. c/o headache, RN aware       Cognition   Orientation  Overall Orientation Status: Within Functional Limits     Objective         Bed mobility  Rolling to Right: Contact guard assistance  Supine to Sit: Contact guard assistance  Scooting: Contact guard assistance  Bed Mobility Comments: HOB raised, use of rails  Transfers  Sit to Stand: Contact guard assistance  Stand to sit: Contact guard assistance  Comment: sit to stand transfers x multiple trials from bed and toilet  Ambulation  Surface: level tile  Device: Single point cane  Assistance: Minimal assistance  Quality of Gait: unsteady, flexed postur, B knee flex, narrow SHABBIR, shuffles with fatigue  Distance: 350 ft  Comments: limited by decreased endurance, unsteady  More Ambulation?: No     Balance  Posture: Fair  Sitting - Static: Good  Sitting - Dynamic: Good;-  Standing - Static: Fair  Standing - Dynamic: Fair;-  Comments: standing balance assessed while using a SPC, stood 2 trials x 2 min ea, up to MIN A due to flexed and unsteady   Exercise  Seated LE exercise program: Long Arc Quads, hip abduction/adduction, heel/toe raises, and marches.  Reps: 15x      AM-PAC Score  AM-PAC Inpatient Mobility Raw Score : 18 (06/09/22 1041)  AM-PAC Inpatient T-Scale Score : 43.63 (06/09/22 1041)  Mobility Inpatient CMS 0-100% Score: 46.58 (06/09/22 1041)  Mobility Inpatient CMS G-Code Modifier : CK (06/09/22 1041)   Goals  Short Term Goals  Time Frame for Short term goals: 14 visits  Short term goal 1: Pt will ambulate 300 feet with least restrictive device and supervision to increase functional independence. Short term goal 2: Pt will demonstrate good- standing balance to decrease fall risk. Short term goal 3: Pt will perform sit<>stand transfer with supervision to increase functional independence. Short term goal 4: Pt will tolerate a 35 minute therapy session to promote increased endurance. Short term goal 5: Pt will negotiate 5 stairs with bilateral handrails and SBA to allow the pt to enter prior living arrangements.      Therapy Time   Individual Concurrent Group Co-treatment   Time In 0904         Time Out 0934         Minutes 30         Timed Code Treatment Minutes: [address] [place], [place]

## 2022-06-09 NOTE — CARE COORDINATION
Transitional planning note: met with patient and family to discuss transitional planning. Plan initially was to go home independently. However, patient lives alone and reports that he feels very weak. He has stairs to climb to get into his home and does not feel he can do this safely. Patient would like referral to [facility]. This is done. Perfect serve message to Ernst Torres NP to notify of above.     [phone]: call placed to [facility] ([facility] care admissions @ [phone] to see if they can accept patient. Rep states that he clinically meets but looking at his benefits and will call back.

## 2022-06-09 NOTE — PROGRESS NOTES
Pt. C/o feeling very hot with burning sensation to shoulders and down arm with CP. Will update attending.

## 2022-06-10 LAB
ALBUMIN SERPL-MCNC: 3.4 G/DL (ref 3.5–5.2)
ANION GAP SERPL CALCULATED.3IONS-SCNC: 10 MMOL/L (ref 9–17)
BUN BLDV-MCNC: 25 MG/DL (ref 8–23)
CALCIUM SERPL-MCNC: 8.8 MG/DL (ref 8.6–10.4)
CHLORIDE BLD-SCNC: 103 MMOL/L (ref 98–107)
CO2: 22 MMOL/L (ref 20–31)
CREAT SERPL-MCNC: 1.37 MG/DL (ref 0.7–1.2)
GFR AFRICAN AMERICAN: >60 ML/MIN
GFR NON-AFRICAN AMERICAN: 50 ML/MIN
GFR SERPL CREATININE-BSD FRML MDRD: ABNORMAL ML/MIN/{1.73_M2}
GLUCOSE BLD-MCNC: 198 MG/DL (ref 70–99)
PHOSPHORUS: 3.5 MG/DL (ref 2.5–4.5)
POTASSIUM SERPL-SCNC: 4.1 MMOL/L (ref 3.7–5.3)
SODIUM BLD-SCNC: 135 MMOL/L (ref 135–144)

## 2022-06-10 PROCEDURE — 36415 COLL VENOUS BLD VENIPUNCTURE: CPT

## 2022-06-10 PROCEDURE — 6370000000 HC RX 637 (ALT 250 FOR IP): Performed by: INTERNAL MEDICINE

## 2022-06-10 PROCEDURE — 2580000003 HC RX 258: Performed by: STUDENT IN AN ORGANIZED HEALTH CARE EDUCATION/TRAINING PROGRAM

## 2022-06-10 PROCEDURE — 97110 THERAPEUTIC EXERCISES: CPT

## 2022-06-10 PROCEDURE — 6370000000 HC RX 637 (ALT 250 FOR IP): Performed by: STUDENT IN AN ORGANIZED HEALTH CARE EDUCATION/TRAINING PROGRAM

## 2022-06-10 PROCEDURE — 99232 SBSQ HOSP IP/OBS MODERATE 35: CPT | Performed by: INTERNAL MEDICINE

## 2022-06-10 PROCEDURE — 97535 SELF CARE MNGMENT TRAINING: CPT

## 2022-06-10 PROCEDURE — 80069 RENAL FUNCTION PANEL: CPT

## 2022-06-10 PROCEDURE — 97116 GAIT TRAINING THERAPY: CPT

## 2022-06-10 PROCEDURE — 1200000000 HC SEMI PRIVATE

## 2022-06-10 RX ORDER — AMITRIPTYLINE HYDROCHLORIDE 25 MG/1
25 TABLET, FILM COATED ORAL NIGHTLY
Status: DISCONTINUED | OUTPATIENT
Start: 2022-06-10 | End: 2022-06-16 | Stop reason: HOSPADM

## 2022-06-10 RX ORDER — GABAPENTIN 100 MG/1
100 CAPSULE ORAL DAILY
Status: DISCONTINUED | OUTPATIENT
Start: 2022-06-10 | End: 2022-06-11

## 2022-06-10 RX ADMIN — ATORVASTATIN CALCIUM 40 MG: 80 TABLET, FILM COATED ORAL at 20:23

## 2022-06-10 RX ADMIN — ENTACAPONE 200 MG: 200 TABLET, FILM COATED ORAL at 13:46

## 2022-06-10 RX ADMIN — CLOPIDOGREL 75 MG: 75 TABLET, FILM COATED ORAL at 07:55

## 2022-06-10 RX ADMIN — ROPINIROLE HYDROCHLORIDE 0.25 MG: 0.25 TABLET, FILM COATED ORAL at 13:46

## 2022-06-10 RX ADMIN — ALLOPURINOL 100 MG: 100 TABLET ORAL at 07:55

## 2022-06-10 RX ADMIN — ENTACAPONE 200 MG: 200 TABLET, FILM COATED ORAL at 20:23

## 2022-06-10 RX ADMIN — AMITRIPTYLINE HYDROCHLORIDE 25 MG: 25 TABLET, FILM COATED ORAL at 20:23

## 2022-06-10 RX ADMIN — GABAPENTIN 100 MG: 100 CAPSULE ORAL at 13:46

## 2022-06-10 RX ADMIN — ISOSORBIDE MONONITRATE 30 MG: 30 TABLET ORAL at 07:55

## 2022-06-10 RX ADMIN — SODIUM CHLORIDE, PRESERVATIVE FREE 10 ML: 5 INJECTION INTRAVENOUS at 20:26

## 2022-06-10 RX ADMIN — CARBIDOPA AND LEVODOPA 1 TABLET: 25; 100 TABLET ORAL at 18:08

## 2022-06-10 RX ADMIN — ENTACAPONE 200 MG: 200 TABLET, FILM COATED ORAL at 18:07

## 2022-06-10 RX ADMIN — METOPROLOL TARTRATE 12.5 MG: 25 TABLET ORAL at 07:55

## 2022-06-10 RX ADMIN — ROPINIROLE HYDROCHLORIDE 0.25 MG: 0.25 TABLET, FILM COATED ORAL at 20:22

## 2022-06-10 RX ADMIN — ENTACAPONE 200 MG: 200 TABLET, FILM COATED ORAL at 07:55

## 2022-06-10 RX ADMIN — CARBIDOPA AND LEVODOPA 1 TABLET: 25; 100 TABLET ORAL at 13:46

## 2022-06-10 RX ADMIN — CARBIDOPA AND LEVODOPA 1 TABLET: 25; 100 TABLET ORAL at 07:55

## 2022-06-10 RX ADMIN — ROPINIROLE HYDROCHLORIDE 0.25 MG: 0.25 TABLET, FILM COATED ORAL at 07:55

## 2022-06-10 RX ADMIN — SODIUM CHLORIDE, PRESERVATIVE FREE 10 ML: 5 INJECTION INTRAVENOUS at 07:43

## 2022-06-10 RX ADMIN — CARBIDOPA AND LEVODOPA 1 TABLET: 25; 100 TABLET ORAL at 20:23

## 2022-06-10 RX ADMIN — PANTOPRAZOLE SODIUM 40 MG: 40 TABLET, DELAYED RELEASE ORAL at 07:55

## 2022-06-10 RX ADMIN — MAGNESIUM GLUCONATE 500 MG ORAL TABLET 400 MG: 500 TABLET ORAL at 07:55

## 2022-06-10 RX ADMIN — METOPROLOL TARTRATE 12.5 MG: 25 TABLET ORAL at 20:23

## 2022-06-10 ASSESSMENT — PAIN SCALES - GENERAL
PAINLEVEL_OUTOF10: 0

## 2022-06-10 NOTE — PROGRESS NOTES
Physical Therapy  Facility/Department: [facility]  Physical Therapy Daily Treatment Note    Name: Randy Block  : 1940  MRN: [id number]  Date of Service: 6/10/2022    Discharge Recommendations:  Patient would benefit from continued therapy after discharge   PT Equipment Recommendations  Equipment Needed: No      Patient Diagnosis(es): The encounter diagnosis was NSTEMI (non-ST elevated myocardial infarction) ([facility] [address] 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 ([facility] [address] CKD (chronic kidney disease) stage 2, GFR 60-89 ml/min, CKD (chronic kidney disease) stage 3, GFR 30-59 ml/min ([facility]), Diverticulosis of colon, GERD (gastroesophageal reflux disease), Impaired renal function, MRSA (methicillin resistant staph aureus) culture positive, Osteoarthritis of knee, Parkinson disease ([facility] [address] Proteinuria, Skull fracture ([facility] [address] Temporary loss of eyesight, and Tubular adenoma of colon. Past Surgical History:  has a past surgical history that includes Colonoscopy (2012); shoulder surgery (Right); pr colsc flx w/rmvl of tumor polyp lesion snare tq (N/A, 2017); and Colonoscopy (2017). Assessment   Body Structures, Functions, Activity Limitations Requiring Skilled Therapeutic Intervention: Decreased functional mobility ; Decreased strength;Decreased endurance;Decreased balance; Increased pain  Assessment: Pt amb 90 ft wit RW and CGA. Pt unsteady and high fall risk.  Recommend contiuned PT after d/c to address deficits  Therapy Prognosis: Good  Activity Tolerance  Activity Tolerance: Patient limited by fatigue;Patient limited by endurance     Plan   Plan  Plan:  (5-6x)  Current Treatment Recommendations: Strengthening,Balance training,Functional mobility training,Transfer training,Gait training,Stair training,Patient/Caregiver education & training,Safety education & training,Home exercise program,Equipment evaluation, education, & procurement,Therapeutic activities,Endurance training  Safety Devices  Type of Devices: Gait belt,Call light within reach,Left in chair,Chair alarm in place,Nurse notified  Restraints  Restraints Initially in Place: No     Restrictions  Restrictions/Precautions  Restrictions/Precautions: Contact Precautions  Required Braces or Orthoses?: No     Subjective   General  Patient assessed for rehabilitation services?: Yes  Response To Previous Treatment: Patient with no complaints from previous session. Family / Caregiver Present: No  Follows Commands: Within Functional Limits  Subjective  Subjective: Pt resting in bed upon arrival, agreeable to PT, pleasant and coopetive. c/o headache, RN aware            Cognition   Orientation  Overall Orientation Status: Within Functional Limits     Objective      Bed mobility  Rolling to Left: Contact guard assistance  Supine to Sit: Contact guard assistance  Scooting: Contact guard assistance  Transfers  Sit to Stand: Contact guard assistance  Stand to sit: Contact guard assistance  Ambulation  Surface: level tile  Device: Rolling Walker  Assistance: Contact guard assistance  Quality of Gait: unsteady, flexed postur, B knee flex, narrow SHABBIR, shuffles with fatigue  Distance: 80 ft  Comments: limited by decreased endurance, unsteady, however much improved with use of RW  More Ambulation?: No     Balance  Posture: Fair  Sitting - Static: Good  Sitting - Dynamic: Good;-  Standing - Static: Fair  Standing - Dynamic: Fair  Comments: assessed with RW   Exercise  Seated LE exercise program: Long Arc Quads, hip abduction/adduction, heel/toe raises, and marches.  Reps: 15x  Standing marching in place and min squats x 10    AM-PAC Score  -PAC Inpatient Mobility Raw Score : 18 (06/09/22 1041)  -PAC Inpatient T-Scale Score : 43.63 (06/09/22 1041)  Mobility Inpatient CMS 0-100% Score: 46.58 (06/09/22 1041)  Mobility Inpatient CMS G-Code Modifier : CK (06/09/22 1041)          Goals  Short Term Goals  Time Frame for Short term goals: 14 visits  Short term goal 1: Pt will ambulate 300 feet with least restrictive device and supervision to increase functional independence. Short term goal 2: Pt will demonstrate good- standing balance to decrease fall risk. Short term goal 3: Pt will perform sit<>stand transfer with supervision to increase functional independence. Short term goal 4: Pt will tolerate a 35 minute therapy session to promote increased endurance. Short term goal 5: Pt will negotiate 5 stairs with bilateral handrails and SBA to allow the pt to enter prior living arrangements.      Therapy Time   Individual Concurrent Group Co-treatment   Time In 6967         Time Out 1120         Minutes 32         Timed Code Treatment Minutes: [address] [facility] , [place]

## 2022-06-10 NOTE — PROGRESS NOTES
In to room for morning assess. Pt. C/o pain,burning and warmth to shoulders, Lt. Chest, and legs that comes and goes. He states that not all of the above always hurt at the same time but he does report that at least one of the above hurts him most of the time.   Will update

## 2022-06-10 NOTE — PROGRESS NOTES
[address] [facility] [address]  PROGRESS NOTE    Shift date: 06/10/22  Shift day: Friday   Shift # 1    Room # 2022/2022-01   Name: Pop Zurita                Taoism: Michaeledie Velazquez [address] of Episcopalian: [address]    Referral: Routine Visit    Admit Date & Time: 6/7/2022  9:22 AM    Assessment:  Pop Zurita is a 79 y.o. male       Intervention:  Writer introduced self and title as  Writer offered space for patient  to express feelings, needs, and concerns and provided a ministry presence. Patient engaged in conversation about his health and his place of Episcopalian.  was a ministry of presence to patient and validated feelings/emotions. Outcome:  Patient expressed gratitude for  visit. Plan:  Chaplains will remain available to offer spiritual and emotional support as needed.       Electronically signed by Inessa Worrlel, on 6/10/2022 at [address] [place] [address],[address] [phone]

## 2022-06-10 NOTE — CARE COORDINATION
Transitional planning. Called [place] at [place] [phone] and left VM to please call CM back. [facility] and left VM again  for [place] in admissions. Saw CM note from yesterday and that number is [phone]. It is for the same person. No message left. [phone] with pt and explained I need more choices. Anywhere in [place]. Explained IMM and signed. Original to pt, copy in chart. 1600 Referrals to [facility], [address]. [address] calls from [facility]. They probably can take, will review and call us back.

## 2022-06-10 NOTE — PLAN OF CARE
Problem: Discharge Planning  Goal: Discharge to home or other facility with appropriate resources  Outcome: Progressing     Problem: Pain  Goal: Verbalizes/displays adequate comfort level or baseline comfort level  Outcome: Progressing     Problem: Chronic Conditions and Co-morbidities  Goal: Patient's chronic conditions and co-morbidity symptoms are monitored and maintained or improved  Outcome: Progressing     Problem: Safety - Adult  Goal: Free from fall injury  Outcome: Progressing     Problem: ABCDS Injury Assessment  Goal: Absence of physical injury  Outcome: Progressing

## 2022-06-10 NOTE — PROGRESS NOTES
Occupational Therapy  Facility/Department: [facility] CAR 2  Daily Treatment Note    NAME: Chacorta Bruno  : 1940  MRN: [id number]    Date of Service: 6/10/2022    Discharge Recommendations:  Patient would benefit from continued therapy after discharge,24 hour supervision or assist  OT Equipment Recommendations  Equipment Needed: Yes  ADL Assistive Devices: Reacher    Patient Diagnosis(es): The encounter diagnosis was NSTEMI (non-ST elevated myocardial infarction) ([facility] [address] Assessment    Activity Tolerance: Patient limited by fatigue;Patient limited by endurance  Discharge Recommendations: Patient would benefit from continued therapy after discharge;24 hour supervision or assist  Equipment Needed: Yes      Plan   Plan  Times per Week: 3-4 x/wk  Current Treatment Recommendations: Balance training;Functional mobility training; Endurance training; Safety education & training;Patient/Caregiver education & training;Equipment evaluation, education, & procurement;Self-Care / ADL; Home management training     Restrictions  Restrictions/Precautions  Restrictions/Precautions: Contact Precautions  Required Braces or Orthoses?: No    Subjective   Subjective  Subjective: RN approved Pt to be seen for OT treatment. Pt was agreeable / cooperative throughout. Pain: 0/10  Orientation  Overall Orientation Status: Within Functional Limits  Cognition  Overall Cognitive Status: Exceptions  Arousal/Alertness: Delayed responses to stimuli  Following Commands: Follows one step commands consistently; Follows multistep commands with repitition; Follows multistep commands with increased time  Attention Span: Attends with cues to redirect  Initiation: Requires cues for some  Sequencing: Requires cues for some      Objective    Comment: Start of tx, Pt reported feeling somewhat dizzy - /80, HR 62, SPO2 92-95% (on RA)    Balance  Sitting: High guard  Standing: With support (while in standing (unilateral support at the sink, alternating while completing grooming tasks))  Transfer Training  Transfer Training: Yes    Overall Level of Assistance: Contact-guard assistance; Additional time; Adaptive equipment (Using [address])  Interventions: Verbal cues; Safety awareness training (Mod VCs task initiation / sequencing / safe and accurate use of DME)  Sit to Stand: Contact-guard assistance; Additional time; Adaptive equipment  Stand to Sit: Contact-guard assistance; Additional time; Adaptive equipment  Toilet Transfer: Contact-guard assistance; Additional time; Adaptive equipment (Using SPC, standard toilet c Left grab bar)    Gait  Overall Level of Assistance: Contact-guard assistance; Adaptive equipment; Additional time (Functional Mobility (in-room / in-home distances and negotiation), with SPC)  Interventions: Verbal cues; Safety awareness training (Mod VCs task initiation / sequencing / safe and accurate use of DME)     ADL  Grooming: Stand by assistance;Verbal cueing; Increased time to complete  Grooming Skilled Clinical Factors: Standing at the sink for hand hygiene, with alternating unilateral support at sink. No LOB. UE Dressing: Minimal assistance; Increased time to complete;Verbal cueing  UE Dressing Skilled Clinical Factors: Seated in recliner to doff / don gown. LE Dressing: Minimal assistance; Increased time to complete;Verbal cueing  LE Dressing Skilled Clinical Factors: Seated in recliner to doff / don shoes, decreased overall safety awareness requiring increased time and cues. Toileting: Contact guard assistance; Increased time to complete; Adaptive equipment  Toileting Skilled Clinical Factors: SImulating toileting with CGA using standard toilet, SPC, grab bar LEft. Additional Comments: Mod VCs task initiation / sequencing / safe and accurate use of DME. Safety Devices  Type of Devices: Gait belt;Call light within reach; Left in chair;Chair alarm in place;Nurse notified  Restraints  Restraints Initially in Place: No     Patient Education  Education Given

## 2022-06-11 LAB
GLUCOSE BLD-MCNC: 111 MG/DL (ref 75–110)
GLUCOSE BLD-MCNC: 172 MG/DL (ref 75–110)

## 2022-06-11 PROCEDURE — 6370000000 HC RX 637 (ALT 250 FOR IP): Performed by: INTERNAL MEDICINE

## 2022-06-11 PROCEDURE — 6370000000 HC RX 637 (ALT 250 FOR IP): Performed by: NURSE PRACTITIONER

## 2022-06-11 PROCEDURE — 2580000003 HC RX 258: Performed by: STUDENT IN AN ORGANIZED HEALTH CARE EDUCATION/TRAINING PROGRAM

## 2022-06-11 PROCEDURE — 82947 ASSAY GLUCOSE BLOOD QUANT: CPT

## 2022-06-11 PROCEDURE — 6370000000 HC RX 637 (ALT 250 FOR IP): Performed by: STUDENT IN AN ORGANIZED HEALTH CARE EDUCATION/TRAINING PROGRAM

## 2022-06-11 PROCEDURE — 1200000000 HC SEMI PRIVATE

## 2022-06-11 PROCEDURE — 97116 GAIT TRAINING THERAPY: CPT

## 2022-06-11 PROCEDURE — 99232 SBSQ HOSP IP/OBS MODERATE 35: CPT | Performed by: INTERNAL MEDICINE

## 2022-06-11 PROCEDURE — 97110 THERAPEUTIC EXERCISES: CPT

## 2022-06-11 RX ORDER — GABAPENTIN 100 MG/1
100 CAPSULE ORAL 3 TIMES DAILY
Status: DISCONTINUED | OUTPATIENT
Start: 2022-06-11 | End: 2022-06-16 | Stop reason: HOSPADM

## 2022-06-11 RX ORDER — CALCIUM CARBONATE 200(500)MG
1000 TABLET,CHEWABLE ORAL 3 TIMES DAILY PRN
Status: DISCONTINUED | OUTPATIENT
Start: 2022-06-11 | End: 2022-06-16 | Stop reason: HOSPADM

## 2022-06-11 RX ADMIN — SODIUM CHLORIDE, PRESERVATIVE FREE 10 ML: 5 INJECTION INTRAVENOUS at 08:39

## 2022-06-11 RX ADMIN — ROPINIROLE HYDROCHLORIDE 0.25 MG: 0.25 TABLET, FILM COATED ORAL at 20:30

## 2022-06-11 RX ADMIN — ENTACAPONE 200 MG: 200 TABLET, FILM COATED ORAL at 18:38

## 2022-06-11 RX ADMIN — ENTACAPONE 200 MG: 200 TABLET, FILM COATED ORAL at 20:30

## 2022-06-11 RX ADMIN — ROPINIROLE HYDROCHLORIDE 0.25 MG: 0.25 TABLET, FILM COATED ORAL at 15:19

## 2022-06-11 RX ADMIN — MAGNESIUM GLUCONATE 500 MG ORAL TABLET 400 MG: 500 TABLET ORAL at 08:26

## 2022-06-11 RX ADMIN — GABAPENTIN 100 MG: 100 CAPSULE ORAL at 08:26

## 2022-06-11 RX ADMIN — MAGNESIUM HYDROXIDE 30 ML: 400 SUSPENSION ORAL at 21:18

## 2022-06-11 RX ADMIN — CARBIDOPA AND LEVODOPA 1 TABLET: 25; 100 TABLET ORAL at 08:26

## 2022-06-11 RX ADMIN — PANTOPRAZOLE SODIUM 40 MG: 40 TABLET, DELAYED RELEASE ORAL at 08:26

## 2022-06-11 RX ADMIN — METOPROLOL TARTRATE 12.5 MG: 25 TABLET ORAL at 20:30

## 2022-06-11 RX ADMIN — ATORVASTATIN CALCIUM 40 MG: 80 TABLET, FILM COATED ORAL at 20:31

## 2022-06-11 RX ADMIN — ALLOPURINOL 100 MG: 100 TABLET ORAL at 08:26

## 2022-06-11 RX ADMIN — ROPINIROLE HYDROCHLORIDE 0.25 MG: 0.25 TABLET, FILM COATED ORAL at 08:26

## 2022-06-11 RX ADMIN — ANTACID TABLETS 1000 MG: 500 TABLET, CHEWABLE ORAL at 21:14

## 2022-06-11 RX ADMIN — AMITRIPTYLINE HYDROCHLORIDE 25 MG: 25 TABLET, FILM COATED ORAL at 20:30

## 2022-06-11 RX ADMIN — CLOPIDOGREL 75 MG: 75 TABLET, FILM COATED ORAL at 08:26

## 2022-06-11 RX ADMIN — ISOSORBIDE MONONITRATE 30 MG: 30 TABLET ORAL at 08:26

## 2022-06-11 RX ADMIN — METOPROLOL TARTRATE 12.5 MG: 25 TABLET ORAL at 08:27

## 2022-06-11 RX ADMIN — GABAPENTIN 100 MG: 100 CAPSULE ORAL at 20:30

## 2022-06-11 RX ADMIN — ONDANSETRON 4 MG: 4 TABLET, ORALLY DISINTEGRATING ORAL at 20:30

## 2022-06-11 RX ADMIN — CARBIDOPA AND LEVODOPA 1 TABLET: 25; 100 TABLET ORAL at 18:38

## 2022-06-11 RX ADMIN — ENTACAPONE 200 MG: 200 TABLET, FILM COATED ORAL at 15:19

## 2022-06-11 RX ADMIN — SODIUM CHLORIDE, PRESERVATIVE FREE 10 ML: 5 INJECTION INTRAVENOUS at 20:38

## 2022-06-11 RX ADMIN — GABAPENTIN 100 MG: 100 CAPSULE ORAL at 15:19

## 2022-06-11 RX ADMIN — CARBIDOPA AND LEVODOPA 1 TABLET: 25; 100 TABLET ORAL at 20:30

## 2022-06-11 RX ADMIN — ENTACAPONE 200 MG: 200 TABLET, FILM COATED ORAL at 08:27

## 2022-06-11 RX ADMIN — CARBIDOPA AND LEVODOPA 1 TABLET: 25; 100 TABLET ORAL at 15:19

## 2022-06-11 NOTE — PLAN OF CARE
Problem: Discharge Planning  Goal: Discharge to home or other facility with appropriate resources  Outcome: Progressing  Flowsheets (Taken 6/10/2022 2000)  Discharge to home or other facility with appropriate resources: Identify barriers to discharge with patient and caregiver     Problem: Pain  Goal: Verbalizes/displays adequate comfort level or baseline comfort level  Outcome: Progressing  Flowsheets  Taken 6/11/2022 0000  Verbalizes/displays adequate comfort level or baseline comfort level: Encourage patient to monitor pain and request assistance  Taken 6/10/2022 2000  Verbalizes/displays adequate comfort level or baseline comfort level: Encourage patient to monitor pain and request assistance     Problem: Chronic Conditions and Co-morbidities  Goal: Patient's chronic conditions and co-morbidity symptoms are monitored and maintained or improved  Outcome: Progressing  Flowsheets (Taken 6/10/2022 2000)  Care Plan - Patient's Chronic Conditions and Co-Morbidity Symptoms are Monitored and Maintained or Improved: Monitor and assess patient's chronic conditions and comorbid symptoms for stability, deterioration, or improvement     Problem: Safety - Adult  Goal: Free from fall injury  Outcome: Progressing     Problem: ABCDS Injury Assessment  Goal: Absence of physical injury  Outcome: Progressing

## 2022-06-11 NOTE — PLAN OF CARE
Problem: Discharge Planning  Goal: Discharge to home or other facility with appropriate resources  6/11/2022 1148 by Donna Akers RN  Outcome: Progressing  6/11/2022 0101 by Raleigh Schneider RN  Outcome: Progressing  Flowsheets (Taken 6/10/2022 2000)  Discharge to home or other facility with appropriate resources: Identify barriers to discharge with patient and caregiver     Problem: Pain  Goal: Verbalizes/displays adequate comfort level or baseline comfort level  6/11/2022 1148 by Donna Akers RN  Outcome: Progressing  Flowsheets (Taken 6/11/2022 0400 by Raleigh Schneider RN)  Verbalizes/displays adequate comfort level or baseline comfort level: Encourage patient to monitor pain and request assistance  6/11/2022 0101 by Raleigh Schneider RN  Outcome: Progressing  Flowsheets  Taken 6/11/2022 0000  Verbalizes/displays adequate comfort level or baseline comfort level: Encourage patient to monitor pain and request assistance  Taken 6/10/2022 2000  Verbalizes/displays adequate comfort level or baseline comfort level: Encourage patient to monitor pain and request assistance     Problem: Chronic Conditions and Co-morbidities  Goal: Patient's chronic conditions and co-morbidity symptoms are monitored and maintained or improved  6/11/2022 1148 by Donna Akers RN  Outcome: Progressing  6/11/2022 0101 by Raleigh Schneider RN  Outcome: Progressing  Flowsheets (Taken 6/10/2022 2000)  Care Plan - Patient's Chronic Conditions and Co-Morbidity Symptoms are Monitored and Maintained or Improved: Monitor and assess patient's chronic conditions and comorbid symptoms for stability, deterioration, or improvement     Problem: Safety - Adult  Goal: Free from fall injury  6/11/2022 1148 by Donna Akers RN  Outcome: Progressing  6/11/2022 0101 by Raleigh Schneider RN  Outcome: Progressing     Problem: ABCDS Injury Assessment  Goal: Absence of physical injury  6/11/2022 1148 by Donna Akers RN  Outcome: Progressing  6/11/2022 0101 by Dar Healy RN  Outcome: Progressing

## 2022-06-11 NOTE — PROGRESS NOTES
[facility]  Office: [phone]  Ana Castillo, DO, Bartolome Daniels, DO, [place], DO, Fior White, DO, Sneha Perez MD, Cara Watkins MD, Elissa Bennett MD, Sadie Toth MD, Perri Jones MD, Romeo Gomez MD, Romeo Palacios MD, Yudelka Trejo DO, Mario Young MD,  Angel Linton DO, Joann Evans MD, Soniya Vivar MD, Jatinder Rosa MD, Coye Meigs, DO, Jonah Paredes MD, Noe Manuel MD, Ruslan Taylor DO, Jaja Mckeon MD, Derrick Dickey, [facility], [facility], CNP, Thanh Starr, CNP, Ivelisse Hargrove, CNP, Humaira Baltazar, CNP, Santos Garcia, CNP, Maira Dodd PA-C, Alina Sal, [facility], Devon Hernandez, CNP, Criselda Palm, CNP, Liseth Dempsey, CNP, Saray Yang, CNS, Myles Wright, [facility], Suzie Santos, CNP, Jhon Miller, CNP, Katie Phelps, [address] [place] [address]    Progress Note    6/11/2022    12:51 PM    Name:   Mila Youngblood  MRN:     [id number]     Acct:      [de-identified]   Room:   2022/2022-01   Day:  4  Admit Date:  6/7/2022  9:22 AM    PCP:   JW Thompson CNP  Code Status:  Full Code    Subjective:     C/C:   Chief Complaint   Patient presents with    Chest Pain     Interval History Status: not changed. Pt doing well today. No fevers, chills, nausea, vomiting or diarrhea. We are waiting on placement   Brief History: This is an 79-year-old male with a past medical history listed below who presents to the emergency department with a chief complaint of sharp chest pain 7-8/10 radiating to his back with shortness of breath and dizziness.  Not current and improved with nitro.  Also endorsing some left knee pain after recent steroid injection with his pain management provider at [facility] pain management clinic on 6/6/2022. Initial troponin was slightly elevated from his baseline at 41 uptrending to 59 without significant EKG changes.  Patient was given nitro and started on a heparin infusion in the emergency department and cardiology was consulted, cardiac cath on 6/8/2022 showed single vessel CAD with patent stent in LAD. Review of Systems:     Constitutional:  negative for chills, fevers, sweats  Respiratory:  negative for cough, dyspnea on exertion, shortness of breath, wheezing  Cardiovascular:  negative for chest pain, chest pressure/discomfort, lower extremity edema, palpitations  Gastrointestinal:  negative for abdominal pain, constipation, diarrhea, nausea, vomiting  Neurological:  negative for dizziness, headache. MSK: admits to joint pain made worse with palpation    Medications: Allergies:     Allergies   Allergen Reactions    Dye [Iodides]      pain    Aspirin      Brain bleed         Current Meds:   Scheduled Meds:    gabapentin  100 mg Oral Daily    amitriptyline  25 mg Oral Nightly    lidocaine  1 patch TransDERmal Daily    sodium chloride flush  5-40 mL IntraVENous 2 times per day    isosorbide mononitrate  30 mg Oral Daily    allopurinol  100 mg Oral Daily    atorvastatin  40 mg Oral Nightly    carbidopa-levodopa  1 tablet Oral 4x Daily    clopidogrel  75 mg Oral Daily    entacapone  200 mg Oral 4x Daily    pantoprazole  40 mg Oral QAM AC    magnesium oxide  400 mg Oral Daily    rOPINIRole  0.25 mg Oral TID    metoprolol tartrate  12.5 mg Oral BID     Continuous Infusions:    sodium chloride       PRN Meds: sodium chloride flush, sodium chloride, sodium chloride flush, potassium chloride **OR** potassium alternative oral replacement **OR** potassium chloride, magnesium sulfate, ondansetron **OR** ondansetron, acetaminophen **OR** acetaminophen, magnesium hydroxide, nitroGLYCERIN    Data:     Past Medical History:   has a past medical history of 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 Social History:   reports that he has quit smoking. He has never used smokeless tobacco. He reports that he does not drink alcohol and does not use drugs. Family History:   Family History   Problem Relation Age of Onset    No Known Problems Mother     No Known Problems Father        Vitals:  /69   Pulse 61   Temp 97.6 °F (36.4 °C)   Resp 16   Ht 5' 7\" (1.702 m)   Wt 170 lb 3.1 oz (77.2 kg)   SpO2 93%   BMI 26.66 kg/m²   Temp (24hrs), Av °F (36.7 °C), Min:97.6 °F (36.4 °C), Max:98.2 °F (36.8 °C)    Recent Labs     22  1130   POCGLU 111*       I/O (24Hr):     Intake/Output Summary (Last 24 hours) at 2022 1251  Last data filed at 2022 0600  Gross per 24 hour   Intake 450 ml   Output 2650 ml   Net -2200 ml       Labs:  Hematology:  Recent Labs     22  025   WBC 7.4   RBC 4.05*   HGB 13.2   HCT 37.5*   MCV 92.6   MCH 32.6   MCHC 35.2*   RDW 14.5*      MPV 9.2     Chemistry:  Recent Labs     22  0251 06/10/22  1015   * 135   K 4.9 4.1    103   CO2 21 22   GLUCOSE 224* 198*   BUN 22 25*   CREATININE 1.51* 1.37*   ANIONGAP 10 10   LABGLOM 44* 50*   GFRAA 54* >60   CALCIUM 8.7 8.8   PHOS  --  3.5     Recent Labs     22  0251 06/10/22  1015 22  1130   PROT 6.0*  --   --    LABALBU 3.4* 3.4*  --    AST 31  --   --    ALT 17  --   --    ALKPHOS 87  --   --    BILITOT 0.30  --   --    POCGLU  --   --  111*     ABG:No results found for: POCPH, PHART, PH, POCPCO2, IGT1PUB, PCO2, POCPO2, PO2ART, PO2, POCHCO3, BOJ9EGE, HCO3, NBEA, PBEA, BEART, BE, THGBART, THB, VDB8CUQ, STJY8ZKC, Y5NURNUG, O2SAT, FIO2  Lab Results   Component Value Date/Time    SPECIAL RFA 16ML 2022 10:46 AM     Lab Results   Component Value Date/Time    CULTURE NO GROWTH 4 DAYS 06/07/2022 10:46 AM       Radiology:  XR CHEST PORTABLE    Result Date: 6/7/2022  No acute cardiopulmonary process. Physical Examination:        General appearance:  alert, cooperative and no distress  Mental Status:  oriented to person, place and time and normal affect  Lungs:  clear to auscultation bilaterally, normal effort  Heart:  regular rate and rhythm, no murmur  Abdomen:  soft, nontender, nondistended, normal bowel sounds, no masses, hepatomegaly, splenomegaly  Extremities:  no edema, redness, tenderness in the calves pain with palpation of chest wall and shoulder  Skin:  no gross lesions, rashes, induration    Assessment:        Hospital Problems           Last Modified POA    Chronic pain of multiple joints (Chronic) 6/9/2022 Yes    Gout 6/9/2022 Yes    Nerve pain 6/10/2022 Yes    CKD (chronic kidney disease) stage 3, GFR 30-59 ml/min ([facility] [address] 6/9/2022 Yes    Depression 6/9/2022 Yes    Hyperlipidemia 6/9/2022 Yes    Essential hypertension 6/9/2022 Yes    Parkinson disease ([address] 6/9/2022 Yes    Chronic pain of left knee 6/9/2022 Yes    Overweight (BMI 25.0-29.9) 6/9/2022 Yes          Plan:        1. Currently we are waiting on placement, discharge order placed 6/9 Discussed with JANUARY Son today  2. Discussed with PT- will ask for reevaluation to see how pt is doing with mobility. Will also need OT to see pt. 3. Continue Lidocaine patch  for chest wall pain. 4. Increase Gabapentin to TID  And continue  lidocaine. 5. RFP reviewed, Scr is stable. 6. Continue management of parkinson's disease with home medications. Dispo: d/c order placed 6/9 waiting on placement.    John Hernandez DO  6/11/2022  12:51 PM

## 2022-06-11 NOTE — PROGRESS NOTES
Physical Therapy  Facility/Department: [facility] CAR 2  Daily Treatment Note  Name: Aj Caba  : 1940  MRN: [id number]  Date of Service: 2022    Discharge Recommendations:  Patient would benefit from continued therapy after discharge   PT Equipment Recommendations  Equipment Needed: No      Patient Diagnosis(es): The encounter diagnosis was NSTEMI (non-ST elevated myocardial infarction) ([facility] [address] 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 [address] CKD (chronic kidney disease) stage 2, GFR 60-89 ml/min, CKD (chronic kidney disease) stage 3, GFR 30-59 ml/min ([facility]), Diverticulosis of colon, GERD (gastroesophageal reflux disease), Impaired renal function, MRSA (methicillin resistant staph aureus) culture positive, Osteoarthritis of knee, Parkinson disease ([facility] [address] Proteinuria, Skull fracture ([facility] [address] Temporary loss of eyesight, and Tubular adenoma of colon. Past Surgical History:  has a past surgical history that includes Colonoscopy (2012); shoulder surgery (Right); pr colsc flx w/rmvl of tumor polyp lesion snare tq (N/A, 2017); and Colonoscopy (2017). Assessment   Body Structures, Functions, Activity Limitations Requiring Skilled Therapeutic Intervention: Decreased functional mobility ; Decreased strength;Decreased endurance;Decreased balance; Increased pain  Assessment: Pt required Gustavo for supine to sit along with increased time and effort. Pt sat EOB for 8 mins with CGA with no LOB noted. Pt ambulated 80 ft with RW and CGA. Pt unsteady and high fall risk. Recommend continued PT after d/c to address functional deficits and regain prior level of independence.   Therapy Prognosis: Good  Decision Making: Medium Complexity  Requires PT Follow-Up: Yes  Activity Tolerance  Activity Tolerance: Patient limited by fatigue;Patient limited by endurance     Plan   Plan  Plan:  (5-6x)  Current Treatment Recommendations: Osiris Pires training,Functional mobility training,Transfer training,Gait training,Stair training,Patient/Caregiver education & training,Safety education & training,Home exercise program,Equipment evaluation, education, & procurement,Therapeutic activities,Endurance training  Safety Devices  Type of Devices: Gait belt,Call light within reach,Chair alarm in place,Nurse notified,Left in bed  Restraints  Restraints Initially in Place: No     Restrictions  Restrictions/Precautions  Restrictions/Precautions: Contact Precautions  Required Braces or Orthoses?: No     Subjective   General  Chart Reviewed: Yes  Response To Previous Treatment: Patient with no complaints from previous session. Family / Caregiver Present: No  Follows Commands: Within Functional Limits  Subjective  Subjective: Pt resting in bed upon arrival, agreeable to PT. Cognition   Orientation  Overall Orientation Status: Within Functional Limits  Cognition  Overall Cognitive Status: Exceptions  Arousal/Alertness: Delayed responses to stimuli  Following Commands: Follows one step commands consistently; Follows multistep commands with repitition; Follows multistep commands with increased time  Attention Span: Attends with cues to redirect  Initiation: Requires cues for some  Sequencing: Requires cues for some     Objective   Bed mobility  Rolling to Left: Contact guard assistance  Rolling to Right: Contact guard assistance  Supine to Sit: Minimal assistance  Sit to Supine: Minimal assistance  Scooting: Contact guard assistance  Bed Mobility Comments: HOB raised, use of rails, Gustavo for BLE progression.   Transfers  Sit to Stand: Contact guard assistance  Stand to sit: Contact guard assistance  Ambulation  Surface: level tile  Device: Rolling Walker  Assistance: Contact guard assistance  Quality of Gait: unsteady, flexed posture, B knee flex, narrow SHABBIR, shuffles with fatigue  Gait Deviations: Slow Ewa;Decreased step length;Decreased step height  Distance: 90 ft.  Comments: limited by decreased endurance, unsteady, however much improved with use of RW  More Ambulation?: No  Stairs/Curb  Stairs?: No     Balance  Posture: Fair  Sitting - Static: Good  Sitting - Dynamic: Good;-  Standing - Static: Fair  Standing - Dynamic: Fair  Comments: assessed with RW  A/AROM Exercises: Ankle pumps x20 BLE, LAQs x10 BLE, SLR x10 BLE. AM-PAC Score  AM-PAC Inpatient Mobility Raw Score : 18 (06/11/22 1541)  AM-PAC Inpatient T-Scale Score : 43.63 (06/11/22 1541)  Mobility Inpatient CMS 0-100% Score: 46.58 (06/11/22 1541)  Mobility Inpatient CMS G-Code Modifier : CK (06/11/22 1541)          Goals  Short Term Goals  Time Frame for Short term goals: 14 visits  Short term goal 1: Pt will ambulate 300 feet with least restrictive device and supervision to increase functional independence. Short term goal 2: Pt will demonstrate good- standing balance to decrease fall risk. Short term goal 3: Pt will perform sit<>stand transfer with supervision to increase functional independence. Short term goal 4: Pt will tolerate a 35 minute therapy session to promote increased endurance. Short term goal 5: Pt will negotiate 5 stairs with bilateral handrails and SBA to allow the pt to enter prior living arrangements.            Therapy Time   Individual Concurrent Group Co-treatment   Time In 1220         Time Out 1246         Minutes 26         Timed Code Treatment Minutes: [address]

## 2022-06-12 LAB
CULTURE: NORMAL
CULTURE: NORMAL
Lab: NORMAL
Lab: NORMAL
SPECIMEN DESCRIPTION: NORMAL
SPECIMEN DESCRIPTION: NORMAL

## 2022-06-12 PROCEDURE — 1200000000 HC SEMI PRIVATE

## 2022-06-12 PROCEDURE — 6370000000 HC RX 637 (ALT 250 FOR IP): Performed by: INTERNAL MEDICINE

## 2022-06-12 PROCEDURE — 6370000000 HC RX 637 (ALT 250 FOR IP): Performed by: STUDENT IN AN ORGANIZED HEALTH CARE EDUCATION/TRAINING PROGRAM

## 2022-06-12 PROCEDURE — 99232 SBSQ HOSP IP/OBS MODERATE 35: CPT | Performed by: INTERNAL MEDICINE

## 2022-06-12 PROCEDURE — 97535 SELF CARE MNGMENT TRAINING: CPT

## 2022-06-12 PROCEDURE — 2580000003 HC RX 258: Performed by: STUDENT IN AN ORGANIZED HEALTH CARE EDUCATION/TRAINING PROGRAM

## 2022-06-12 RX ADMIN — ISOSORBIDE MONONITRATE 30 MG: 30 TABLET ORAL at 09:23

## 2022-06-12 RX ADMIN — ALLOPURINOL 100 MG: 100 TABLET ORAL at 09:23

## 2022-06-12 RX ADMIN — ROPINIROLE HYDROCHLORIDE 0.25 MG: 0.25 TABLET, FILM COATED ORAL at 20:26

## 2022-06-12 RX ADMIN — CARBIDOPA AND LEVODOPA 1 TABLET: 25; 100 TABLET ORAL at 09:21

## 2022-06-12 RX ADMIN — CARBIDOPA AND LEVODOPA 1 TABLET: 25; 100 TABLET ORAL at 14:00

## 2022-06-12 RX ADMIN — PANTOPRAZOLE SODIUM 40 MG: 40 TABLET, DELAYED RELEASE ORAL at 09:23

## 2022-06-12 RX ADMIN — METOPROLOL TARTRATE 12.5 MG: 25 TABLET ORAL at 09:25

## 2022-06-12 RX ADMIN — CARBIDOPA AND LEVODOPA 1 TABLET: 25; 100 TABLET ORAL at 18:09

## 2022-06-12 RX ADMIN — CARBIDOPA AND LEVODOPA 1 TABLET: 25; 100 TABLET ORAL at 20:26

## 2022-06-12 RX ADMIN — ROPINIROLE HYDROCHLORIDE 0.25 MG: 0.25 TABLET, FILM COATED ORAL at 09:24

## 2022-06-12 RX ADMIN — ENTACAPONE 200 MG: 200 TABLET, FILM COATED ORAL at 09:23

## 2022-06-12 RX ADMIN — MAGNESIUM GLUCONATE 500 MG ORAL TABLET 400 MG: 500 TABLET ORAL at 09:23

## 2022-06-12 RX ADMIN — GABAPENTIN 100 MG: 100 CAPSULE ORAL at 09:23

## 2022-06-12 RX ADMIN — ENTACAPONE 200 MG: 200 TABLET, FILM COATED ORAL at 20:26

## 2022-06-12 RX ADMIN — ENTACAPONE 200 MG: 200 TABLET, FILM COATED ORAL at 18:09

## 2022-06-12 RX ADMIN — ENTACAPONE 200 MG: 200 TABLET, FILM COATED ORAL at 14:00

## 2022-06-12 RX ADMIN — GABAPENTIN 100 MG: 100 CAPSULE ORAL at 14:00

## 2022-06-12 RX ADMIN — METOPROLOL TARTRATE 12.5 MG: 25 TABLET ORAL at 20:24

## 2022-06-12 RX ADMIN — AMITRIPTYLINE HYDROCHLORIDE 25 MG: 25 TABLET, FILM COATED ORAL at 20:26

## 2022-06-12 RX ADMIN — CLOPIDOGREL 75 MG: 75 TABLET, FILM COATED ORAL at 09:23

## 2022-06-12 RX ADMIN — ATORVASTATIN CALCIUM 40 MG: 80 TABLET, FILM COATED ORAL at 20:26

## 2022-06-12 RX ADMIN — SODIUM CHLORIDE, PRESERVATIVE FREE 10 ML: 5 INJECTION INTRAVENOUS at 09:24

## 2022-06-12 RX ADMIN — ROPINIROLE HYDROCHLORIDE 0.25 MG: 0.25 TABLET, FILM COATED ORAL at 15:00

## 2022-06-12 RX ADMIN — GABAPENTIN 100 MG: 100 CAPSULE ORAL at 20:26

## 2022-06-12 ASSESSMENT — PAIN SCALES - GENERAL: PAINLEVEL_OUTOF10: 0

## 2022-06-12 NOTE — PROGRESS NOTES
Occupational Therapy  Facility/Department: [facility] CAR 2  Occupational Therapy Daily Treatment Note    Name: Morenita Carmona  : 1940  MRN: [id number]  Date of Service: 2022    Discharge Recommendations:  Patient would benefit from continued therapy after discharge       Patient Diagnosis(es): The encounter diagnosis was NSTEMI (non-ST elevated myocardial infarction) ([facility] 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 Past Surgical History:  has a past surgical history that includes Colonoscopy (2012); shoulder surgery (Right); pr colsc flx w/rmvl of tumor polyp lesion snare tq (N/A, 2017); and Colonoscopy (2017). Assessment   Performance deficits / Impairments: Decreased functional mobility ; Decreased ADL status; Decreased endurance;Decreased balance;Decreased high-level IADLs;Decreased coordination  Assessment: Pt would continue to benefit from continued OT services throughout acute care and post acute care for improved independence and safety with ADLs/IADLs and functional transfers/mobility.   Prognosis: Good  Activity Tolerance  Activity Tolerance: Patient Tolerated treatment well;Patient limited by pain        Plan   Plan  Times per Week: 3-4 x/wk  Current Treatment Recommendations: Balance training,Functional mobility training,Endurance training,Safety education & training,Patient/Caregiver education & training,Equipment evaluation, education, & procurement,Self-Care / ADL,Home management training Restrictions  Restrictions/Precautions  Restrictions/Precautions: Contact Precautions,Up as Tolerated,Fall Risk  Required Braces or Orthoses?: No    Subjective   General  Patient assessed for rehabilitation services?: Yes  Family / Caregiver Present: No  General Comment  Comments: RN ok'd pt for OT tx this date. Pt agreeable to session and pleasant/cooperative throughout. Pt reports 7/10 pain L knee, chronic and headache. RN notified. Pt on RA, SpO2 @96%     Objective   Safety Devices  Type of Devices: Gait belt;Call light within reach; Chair alarm in place;Nurse notified; Left in chair;Patient at risk for falls  Bed Mobility Training  Overall Level of Assistance: Additional time;Minimum assistance  Balance  Sitting: With support (sitting on EOB and in recliner for approx 50 min for self care supported by back of chair or BUE)  Standing: With support (w/RW for approx 3-4 min total for transfers, func mob and angelina care w/no LOB)  Functional mobility: CGA w/RW, w/no LOB    ADL  Feeding: Stand by assistance;Setup; Increased time to complete (able to open most containers and feed self)  Grooming: Stand by assistance;Setup; Increased time to complete;Verbal cueing (wash face/head, comb hair, brush teeth)  UE Bathing: Setup;Minimal assistance;Verbal cueing; Increased time to complete (assist to wash back)  LE Bathing: Setup; Moderate assistance;Verbal cueing; Increased time to complete (assist to wash lower legs and feet)  UE Dressing: Minimal assistance; Increased time to complete;Verbal cueing;Setup (assist to snap, thread arms and tie gown)  LE Dressing: Increased time to complete;Verbal cueing;Maximum assistance;Setup (assist to doff/don footies)  Toileting: Minimal assistance; Increased time to complete;Setup (using urinal, stood for angelina care, assisted w/washing bottom)      Pt in bed upon arrival and pt was soiled w/urine. Pt C/O being dizzy, BP taken 99/58 and RN was notified.  Transferred to EOB and sat for approx 5 min and dizziness had improved. STS and func mob to recliner. Pt setup at tray table for self care (see above for LOF). Pt needs increased time and assist d/t fatigue and pain. Pt remained up in recliner, dinner arrived, warmed and setup on tray table. RN notified. Bed mobility  Supine to Sit: Minimal assistance  Sit to Supine: Unable to assess (left up in chair)  Scooting: Minimal assistance  Bed Mobility Comments: HOB raised, use of rails, Gustavo for trunk and BLE progression        Cognition  Overall Cognitive Status: Exceptions  Arousal/Alertness: Appropriate responses to stimuli  Following Commands: Follows one step commands consistently; Follows multistep commands with repitition; Follows multistep commands with increased time  Attention Span: Attends with cues to redirect  Initiation: Requires cues for some  Sequencing: Requires cues for some     Education Given To: Patient  Education Provided: Precautions; ADL Adaptive Strategies;Transfer Training;Energy Conservation; Fall Prevention Strategies; Equipment;Role of Therapy;Plan of Care  Education Method: Demonstration;Verbal  Barriers to Learning: None  Education Outcome: Verbalized understanding;Demonstrated understanding;Continued education needed    AM-PAC Score  AM-Located within [facility] Inpatient Daily Activity Raw Score: 15 (06/12/22 1721)  AM-PAC Inpatient ADL T-Scale Score : 34.69 (06/12/22 1721)  ADL Inpatient CMS 0-100% Score: 56.46 (06/12/22 1721)  ADL Inpatient CMS G-Code Modifier : CK (06/12/22 1721)    Goals  Short Term Goals  Time Frame for Short term goals: By discharge, pt will:  Short Term Goal 1: Demo Mod I for functional transfers and functioanl mobility with use of LRD PRN for engagement in ADLs/IADLs  Short Term Goal 2: Demo I for UB ADLs  Short Term Goal 3: Demo Mod I for LB ADLs and toileting tasks  Short Term Goal 4: Demo 8+ min dynamic standing and reaching outside [place] in multiple planes with 0 LOB and SUP for improved balance during ADLs/IADLs  Short Term Goal 5:  Increase activity tolerance to 35+ min for improved participation in ADLs/IADLs     Therapy Time   Individual Concurrent Group Co-treatment   Time In  1625         Time Out  1720         Minutes  55 total tx time                 [address]/SHANIA

## 2022-06-12 NOTE — PROGRESS NOTES
[facility]  Office: [phone]  Roshan Bobby, DO, Hal Regan, DO, Catie Wise DO, Miguel A White, DO, Roque Fernández MD, Karri Saavedra MD, Alysa King MD, Chichi Greer MD, Tatiana Marsh MD, Teri Lopez MD, Glo Moritz, MD, Umang Landon DO, Gonsalo Wyatt MD,  Selina Mreaz, DO, Robin Khan MD, Martell Angel MD, Felix Tse MD, Moris Amaya DO, Doyne Apgar, MD, Agus Corbin MD, Mauro Holt DO, Ananda Morales MD, Fadumo Patel [facility], [facility], [facility], Issac Agarwal, CNP, Ingrid Hoyos, CNP, Vicki Avalos, CNP, Carlee Rivera, CNP, Jose Martin Boston PA-C, Eduardo Silva, [facility], Shiloh Colon, CNP, Colonel Kirk, CNP, Marzena Thrasher, CNP, Rex Blanchard, CNS, Pj Dumont, [facility], Ricardo Michelle, CNP, Nelly Wen, [facility], Yohannes Mendez, [address] [place] [address]    Progress Note    2022    10:06 AM    Name:   Donna Bryant  MRN:     [id number]     Acct:      [de-identified]   Room:      Day:  5  Admit Date:  2022  9:22 AM    PCP:   WJ Baker CNP  Code Status:  Full Code    Subjective:     C/C:   Chief Complaint   Patient presents with    Chest Pain     Interval History Status: not changed. Pt doing well today. No fevers, chills, nausea, vomiting or diarrhea. Pain is improved. We are waiting on placement   Brief History: This is an 79-year-old male with a past medical history listed below who presents to the emergency department with a chief complaint of sharp chest pain 7-8/10 radiating to his back with shortness of breath and dizziness.  Not current and improved with nitro.  Also endorsing some left knee pain after recent steroid injection with his pain management provider at [facility] pain management clinic on 2022. Initial troponin was slightly elevated from his baseline at 41 uptrending to 59 without significant EKG changes.  Patient was given nitro and started on a heparin infusion in the emergency department and cardiology was consulted, cardiac cath on 6/8/2022 showed single vessel CAD with patent stent in LAD. Review of Systems:     Constitutional:  negative for chills, fevers, sweats  Respiratory:  negative for cough, dyspnea on exertion, shortness of breath, wheezing  Cardiovascular:  negative for chest pain, chest pressure/discomfort, lower extremity edema, palpitations  Gastrointestinal:  negative for abdominal pain, constipation, diarrhea, nausea, vomiting  Neurological:  negative for dizziness, headache. MSK: admits to joint pain made worse with palpation    Medications: Allergies:     Allergies   Allergen Reactions    Dye [Iodides]      pain    Aspirin      Brain bleed         Current Meds:   Scheduled Meds:    gabapentin  100 mg Oral TID    amitriptyline  25 mg Oral Nightly    lidocaine  1 patch TransDERmal Daily    sodium chloride flush  5-40 mL IntraVENous 2 times per day    isosorbide mononitrate  30 mg Oral Daily    allopurinol  100 mg Oral Daily    atorvastatin  40 mg Oral Nightly    carbidopa-levodopa  1 tablet Oral 4x Daily    clopidogrel  75 mg Oral Daily    entacapone  200 mg Oral 4x Daily    pantoprazole  40 mg Oral QAM AC    magnesium oxide  400 mg Oral Daily    rOPINIRole  0.25 mg Oral TID    metoprolol tartrate  12.5 mg Oral BID     Continuous Infusions:    sodium chloride       PRN Meds: calcium carbonate, sodium chloride flush, sodium chloride, sodium chloride flush, potassium chloride **OR** potassium alternative oral replacement **OR** potassium chloride, magnesium sulfate, ondansetron **OR** ondansetron, acetaminophen **OR** acetaminophen, magnesium hydroxide, nitroGLYCERIN    Data:     Past Medical History:   has a past medical history of 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 Social History:   reports that he has quit smoking. He has never used smokeless tobacco. He reports that he does not drink alcohol and does not use drugs. Family History:   Family History   Problem Relation Age of Onset    No Known Problems Mother     No Known Problems Father        Vitals:  BP (!) 111/57   Pulse 51   Temp 98.4 °F (36.9 °C) (Oral)   Resp 17   Ht 5' 7\" (1.702 m)   Wt 168 lb 14 oz (76.6 kg)   SpO2 95%   BMI 26.45 kg/m²   Temp (24hrs), Av.2 °F (36.8 °C), Min:97.6 °F (36.4 °C), Max:99 °F (37.2 °C)    Recent Labs     22  11322   POCGLU 111* 172*       I/O (24Hr): Intake/Output Summary (Last 24 hours) at 2022 1006  Last data filed at 2022 0400  Gross per 24 hour   Intake --   Output 2700 ml   Net -2700 ml       Labs:  Hematology:  No results for input(s): WBC, RBC, HGB, HCT, MCV, MCH, MCHC, RDW, PLT, MPV, SEDRATE, CRP, INR, DDIMER, VH8CUAGO, LABABSO in the last 72 hours.     Invalid input(s): PT  Chemistry:  Recent Labs     06/10/22  1015      K 4.1      CO2 22   GLUCOSE 198*   BUN 25*   CREATININE 1.37*   ANIONGAP 10   LABGLOM 50*   GFRAA >60   CALCIUM 8.8   PHOS 3.5     Recent Labs     06/10/22  1015 22  11322   LABALBU 3.4*  --   --    POCGLU  --  111* 172*     ABG:No results found for: POCPH, PHART, PH, POCPCO2, XVL8ODX, PCO2, POCPO2, PO2ART, PO2, POCHCO3, IEI8ZWS, HCO3, NBEA, PBEA, BEART, BE, THGBART, THB, PBR0ZQA, RFSZ0WOE, V7UQMSNN, O2SAT, FIO2  Lab Results   Component Value Date/Time    SPECIAL RFA 16ML 2022 10:46 AM     Lab Results   Component Value Date/Time    CULTURE NO GROWTH 4 DAYS 2022 10:46 AM Radiology:  XR CHEST PORTABLE    Result Date: 6/7/2022  No acute cardiopulmonary process. Physical Examination:        General appearance:  alert, cooperative and no distress  Mental Status:  oriented to person, place and time and normal affect  Lungs:  clear to auscultation bilaterally, normal effort  Heart:  regular rate and rhythm, no murmur  Abdomen:  soft, nontender, nondistended, normal bowel sounds, no masses, hepatomegaly, splenomegaly  Extremities:  no edema, redness, tenderness in the calves pain with palpation of chest wall and shoulder  Skin:  no gross lesions, rashes, induration  Neuro:pill rolling tremor    Assessment:        Hospital Problems           Last Modified POA    Chronic pain of multiple joints (Chronic) 6/9/2022 Yes    Gout 6/9/2022 Yes    Nerve pain 6/10/2022 Yes    CKD (chronic kidney disease) stage 3, GFR 30-59 ml/min ([facility] 6/9/2022 Yes    Depression 6/9/2022 Yes    Hyperlipidemia 6/9/2022 Yes    Essential hypertension 6/9/2022 Yes    Parkinson disease ([facility] [address] 6/9/2022 Yes    Chronic pain of left knee 6/9/2022 Yes    Overweight (BMI 25.0-29.9) 6/9/2022 Yes          Plan:        1. Currently we are waiting on placement, discharge order placed 6/9   2. Discussed with CM crystal- PT notes reviewed. Pt walked 90 feet. Will discuss with pt again and see if he can be discharged home opposed to SNF. 3. Continue Lidocaine patch  for chest wall pain. 4. Continue Gabapentin to TID continue  lidocaine. 5. RFP reviewed, Scr is stable. 6. Continue management of parkinson's disease with home medications. Dispo: d/c order placed 6/9 waiting on placement.    Cinda Dennis,   6/12/2022  10:06 AM

## 2022-06-12 NOTE — CARE COORDINATION
Met with pt to discuss transitional planning. Discussed therapy session from yesterday.  Pt feels weak and wants to pursue SNF

## 2022-06-12 NOTE — FLOWSHEET NOTE
Patient resting comfortably, up to chair, all vitals stable, no complaints or needs, will continue to monitor.

## 2022-06-13 PROCEDURE — 1200000000 HC SEMI PRIVATE

## 2022-06-13 PROCEDURE — 6370000000 HC RX 637 (ALT 250 FOR IP): Performed by: INTERNAL MEDICINE

## 2022-06-13 PROCEDURE — 6370000000 HC RX 637 (ALT 250 FOR IP): Performed by: STUDENT IN AN ORGANIZED HEALTH CARE EDUCATION/TRAINING PROGRAM

## 2022-06-13 PROCEDURE — 99231 SBSQ HOSP IP/OBS SF/LOW 25: CPT | Performed by: INTERNAL MEDICINE

## 2022-06-13 PROCEDURE — 2580000003 HC RX 258: Performed by: STUDENT IN AN ORGANIZED HEALTH CARE EDUCATION/TRAINING PROGRAM

## 2022-06-13 RX ADMIN — GABAPENTIN 100 MG: 100 CAPSULE ORAL at 13:00

## 2022-06-13 RX ADMIN — CARBIDOPA AND LEVODOPA 1 TABLET: 25; 100 TABLET ORAL at 20:09

## 2022-06-13 RX ADMIN — ENTACAPONE 200 MG: 200 TABLET, FILM COATED ORAL at 08:36

## 2022-06-13 RX ADMIN — ENTACAPONE 200 MG: 200 TABLET, FILM COATED ORAL at 20:09

## 2022-06-13 RX ADMIN — ATORVASTATIN CALCIUM 40 MG: 80 TABLET, FILM COATED ORAL at 20:09

## 2022-06-13 RX ADMIN — ALLOPURINOL 100 MG: 100 TABLET ORAL at 08:36

## 2022-06-13 RX ADMIN — ENTACAPONE 200 MG: 200 TABLET, FILM COATED ORAL at 16:31

## 2022-06-13 RX ADMIN — CARBIDOPA AND LEVODOPA 1 TABLET: 25; 100 TABLET ORAL at 12:59

## 2022-06-13 RX ADMIN — PANTOPRAZOLE SODIUM 40 MG: 40 TABLET, DELAYED RELEASE ORAL at 08:36

## 2022-06-13 RX ADMIN — ROPINIROLE HYDROCHLORIDE 0.25 MG: 0.25 TABLET, FILM COATED ORAL at 08:36

## 2022-06-13 RX ADMIN — MAGNESIUM GLUCONATE 500 MG ORAL TABLET 400 MG: 500 TABLET ORAL at 08:36

## 2022-06-13 RX ADMIN — SODIUM CHLORIDE, PRESERVATIVE FREE 10 ML: 5 INJECTION INTRAVENOUS at 08:36

## 2022-06-13 RX ADMIN — ROPINIROLE HYDROCHLORIDE 0.25 MG: 0.25 TABLET, FILM COATED ORAL at 20:08

## 2022-06-13 RX ADMIN — CARBIDOPA AND LEVODOPA 1 TABLET: 25; 100 TABLET ORAL at 16:31

## 2022-06-13 RX ADMIN — CLOPIDOGREL 75 MG: 75 TABLET, FILM COATED ORAL at 08:36

## 2022-06-13 RX ADMIN — GABAPENTIN 100 MG: 100 CAPSULE ORAL at 20:09

## 2022-06-13 RX ADMIN — AMITRIPTYLINE HYDROCHLORIDE 25 MG: 25 TABLET, FILM COATED ORAL at 20:09

## 2022-06-13 RX ADMIN — ROPINIROLE HYDROCHLORIDE 0.25 MG: 0.25 TABLET, FILM COATED ORAL at 13:00

## 2022-06-13 RX ADMIN — ENTACAPONE 200 MG: 200 TABLET, FILM COATED ORAL at 12:59

## 2022-06-13 RX ADMIN — ACETAMINOPHEN 650 MG: 325 TABLET ORAL at 12:50

## 2022-06-13 RX ADMIN — ISOSORBIDE MONONITRATE 30 MG: 30 TABLET ORAL at 08:36

## 2022-06-13 RX ADMIN — SODIUM CHLORIDE, PRESERVATIVE FREE 10 ML: 5 INJECTION INTRAVENOUS at 20:09

## 2022-06-13 RX ADMIN — CARBIDOPA AND LEVODOPA 1 TABLET: 25; 100 TABLET ORAL at 08:36

## 2022-06-13 RX ADMIN — METOPROLOL TARTRATE 12.5 MG: 25 TABLET ORAL at 20:08

## 2022-06-13 RX ADMIN — GABAPENTIN 100 MG: 100 CAPSULE ORAL at 08:36

## 2022-06-13 NOTE — PLAN OF CARE
Problem: Discharge Planning  Goal: Discharge to home or other facility with appropriate resources  6/13/2022 0911 by Jessy Moffett RN  Outcome: Progressing     Problem: Pain  Goal: Verbalizes/displays adequate comfort level or baseline comfort level  6/13/2022 0911 by Jessy Moffett RN  Outcome: Progressing    Problem: Chronic Conditions and Co-morbidities  Goal: Patient's chronic conditions and co-morbidity symptoms are monitored and maintained or improved  6/13/2022 0911 by Jessy Moffett RN  Outcome: Progressing     Problem: Safety - Adult  Goal: Free from fall injury  6/13/2022 0911 by Jessy Moffett RN  Outcome: Progressing     Problem: ABCDS Injury Assessment  Goal: Absence of physical injury  6/13/2022 0911 by Jessy Moffett RN  Outcome: Progressing

## 2022-06-13 NOTE — PROGRESS NOTES
[facility]  Office: [phone]  Sayda Bocanegra, DO, Tashia Astudillo, DO, Olena Alamo, DO, Xavier Dagoberto Blood, DO, Josephine Lopez MD, Elizabeth Diane MD, Shonda Murray MD, Mauri Myles MD, Sara Porter MD, John Mendez MD, Ban Funk MD, Raman Cain DO, Reta Benson MD,  Ranjeet Floyd DO, Isa Negron MD, Zoe Aldana MD, Azul Garcia MD, Álvaro Yousif DO, Aravind Castanon MD, Fatou Johnson MD, iVdal Andujar DO, Polo Pierre MD, Moshe Kennedy, [facility], [facility], CNP, Milena Hollis, [facility], Sonia Wills, [facility], Raul Saha, [facility], Colleen Das, CNP, Rowan Oppenheim, PAAydinC, Ernst Torres [facility], Ofelia Lemus, CNP, Betty Calles, CNP, Claudeen Quarry, CNP, Sumanth Valencia, CNS, Scott Grigsby, [facility], Nikki Russell, CNP, Carol Baltazar, CNP, Deshawn Angeles, [facility]    Progress Note    6/13/2022    12:38 PM    Name:   Odessa Rizzo  MRN:     [id number]     Acct:      [de-identified]   Room:   2022/2022-01   Day:  6  Admit Date:  6/7/2022  9:22 AM    PCP:   JW Machuca CNP  Code Status:  Full Code    Subjective:     C/C:   Chief Complaint   Patient presents with    Chest Pain     Interval History Status: not changed. Pt doing well today. No fevers, chills, nausea, vomiting or diarrhea. We are waiting on placement   Brief History: This is an 70-year-old male with a past medical history listed below who presents to the emergency department with a chief complaint of sharp chest pain 7-8/10 radiating to his back with shortness of breath and dizziness.  Not current and improved with nitro.  Also endorsing some left knee pain after recent steroid injection with his pain management provider at [facility] pain management clinic on 6/6/2022. Initial troponin was slightly elevated from his baseline at 41 uptrending to 59 without significant EKG changes.  Patient was given nitro and started on a heparin infusion in the emergency department and cardiology was consulted, cardiac cath on 6/8/2022 showed single vessel CAD with patent stent in LAD. Review of Systems:     Constitutional:  negative for chills, fevers, sweats  Respiratory:  negative for cough, dyspnea on exertion, shortness of breath, wheezing  Cardiovascular:  negative for chest pain, chest pressure/discomfort, lower extremity edema, palpitations  Gastrointestinal:  negative for abdominal pain, constipation, diarrhea, nausea, vomiting  Neurological:  negative for dizziness, headache. MSK: admits to joint pain made worse with palpation    Medications: Allergies:     Allergies   Allergen Reactions    Dye [Iodides]      pain    Aspirin      Brain bleed         Current Meds:   Scheduled Meds:    gabapentin  100 mg Oral TID    amitriptyline  25 mg Oral Nightly    lidocaine  1 patch TransDERmal Daily    sodium chloride flush  5-40 mL IntraVENous 2 times per day    isosorbide mononitrate  30 mg Oral Daily    allopurinol  100 mg Oral Daily    atorvastatin  40 mg Oral Nightly    carbidopa-levodopa  1 tablet Oral 4x Daily    clopidogrel  75 mg Oral Daily    entacapone  200 mg Oral 4x Daily    pantoprazole  40 mg Oral QAM AC    magnesium oxide  400 mg Oral Daily    rOPINIRole  0.25 mg Oral TID    metoprolol tartrate  12.5 mg Oral BID     Continuous Infusions:    sodium chloride       PRN Meds: calcium carbonate, sodium chloride flush, sodium chloride, sodium chloride flush, potassium chloride **OR** potassium alternative oral replacement **OR** potassium chloride, magnesium sulfate, ondansetron **OR** ondansetron, acetaminophen **OR** acetaminophen, magnesium hydroxide, nitroGLYCERIN    Data:     Past Medical History:   has a past medical history of 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 Social History:   reports that he has quit smoking. He has never used smokeless tobacco. He reports that he does not drink alcohol and does not use drugs. Family History:   Family History   Problem Relation Age of Onset    No Known Problems Mother     No Known Problems Father        Vitals:  BP 97/60   Pulse 55   Temp 97.9 °F (36.6 °C) (Oral)   Resp 17   Ht 5' 7\" (1.702 m)   Wt 168 lb 14 oz (76.6 kg)   SpO2 95%   BMI 26.45 kg/m²   Temp (24hrs), Av.9 °F (36.6 °C), Min:97.7 °F (36.5 °C), Max:98.5 °F (36.9 °C)    Recent Labs     22  11322   POCGLU 111* 172*       I/O (24Hr): Intake/Output Summary (Last 24 hours) at 2022 1238  Last data filed at 2022 1200  Gross per 24 hour   Intake 472 ml   Output 2725 ml   Net -2253 ml       Labs:  Hematology:  No results for input(s): WBC, RBC, HGB, HCT, MCV, MCH, MCHC, RDW, PLT, MPV, SEDRATE, CRP, INR, DDIMER, VV3BKDIV, LABABSO in the last 72 hours. Invalid input(s): PT  Chemistry:  No results for input(s): NA, K, CL, CO2, GLUCOSE, BUN, CREATININE, MG, ANIONGAP, LABGLOM, GFRAA, CALCIUM, CAION, PHOS, PSA, PROBNP, TROPHS, CKTOTAL, CKMB, CKMBINDEX, MYOGLOBIN, DIGOXIN, LACTACIDWB in the last 72 hours.   Recent Labs     22  11322   POCGLU 111* 172*     ABG:No results found for: POCPH, PHART, PH, POCPCO2, MUX4TTC, PCO2, POCPO2, PO2ART, PO2, POCHCO3, OKB0QFW, HCO3, NBEA, PBEA, BEART, BE, THGBART, THB, IEQ6IKM, PHHN6WHH, L3SCJFDT, O2SAT, FIO2  Lab Results   Component Value Date/Time    SPECIAL RFA 16ML 2022 10:46 AM     Lab Results   Component Value Date/Time    CULTURE NO GROWTH 5 DAYS 2022 10:46 AM Radiology:  XR CHEST PORTABLE    Result Date: 6/7/2022  No acute cardiopulmonary process. Physical Examination:        General appearance:  alert, cooperative and no distress  Mental Status:  oriented to person, place and time and normal affect  Lungs:  clear to auscultation bilaterally, normal effort  Heart:  regular rate and rhythm, no murmur  Abdomen:  soft, nontender, nondistended, normal bowel sounds, no masses, hepatomegaly, splenomegaly  Extremities:  no edema, redness, tenderness in the calves pain with palpation of chest wall and shoulder  Skin:  no gross lesions, rashes, induration  Neuro:pill rolling tremor    Assessment:        Hospital Problems           Last Modified POA    Chronic pain of multiple joints (Chronic) 6/9/2022 Yes    Gout 6/9/2022 Yes    Nerve pain 6/10/2022 Yes    CKD (chronic kidney disease) stage 3, GFR 30-59 ml/min ([address] 6/9/2022 Yes    Depression 6/9/2022 Yes    Hyperlipidemia 6/9/2022 Yes    Essential hypertension 6/9/2022 Yes    Parkinson disease ([address] 6/9/2022 Yes    Chronic pain of left knee 6/9/2022 Yes    Overweight (BMI 25.0-29.9) 6/9/2022 Yes          Plan:        1. Currently we are waiting on placement, discharge order placed 6/9    2. Continue Lidocaine patch  for chest wall pain. 3. Continue Gabapentin to TID continue  lidocaine. 4. Continue management of parkinson's disease with home medications. Dispo: d/c order placed 6/9 waiting on placement.    Tesha Green DO  6/13/2022  12:38 PM

## 2022-06-14 PROCEDURE — 6370000000 HC RX 637 (ALT 250 FOR IP): Performed by: INTERNAL MEDICINE

## 2022-06-14 PROCEDURE — 99232 SBSQ HOSP IP/OBS MODERATE 35: CPT | Performed by: STUDENT IN AN ORGANIZED HEALTH CARE EDUCATION/TRAINING PROGRAM

## 2022-06-14 PROCEDURE — 6370000000 HC RX 637 (ALT 250 FOR IP): Performed by: STUDENT IN AN ORGANIZED HEALTH CARE EDUCATION/TRAINING PROGRAM

## 2022-06-14 PROCEDURE — 97110 THERAPEUTIC EXERCISES: CPT

## 2022-06-14 PROCEDURE — 2580000003 HC RX 258: Performed by: STUDENT IN AN ORGANIZED HEALTH CARE EDUCATION/TRAINING PROGRAM

## 2022-06-14 PROCEDURE — 1200000000 HC SEMI PRIVATE

## 2022-06-14 PROCEDURE — 97116 GAIT TRAINING THERAPY: CPT

## 2022-06-14 RX ADMIN — CARBIDOPA AND LEVODOPA 1 TABLET: 25; 100 TABLET ORAL at 08:57

## 2022-06-14 RX ADMIN — MAGNESIUM GLUCONATE 500 MG ORAL TABLET 400 MG: 500 TABLET ORAL at 08:56

## 2022-06-14 RX ADMIN — CARBIDOPA AND LEVODOPA 1 TABLET: 25; 100 TABLET ORAL at 18:29

## 2022-06-14 RX ADMIN — ROPINIROLE HYDROCHLORIDE 0.25 MG: 0.25 TABLET, FILM COATED ORAL at 08:56

## 2022-06-14 RX ADMIN — SODIUM CHLORIDE, PRESERVATIVE FREE 10 ML: 5 INJECTION INTRAVENOUS at 19:59

## 2022-06-14 RX ADMIN — GABAPENTIN 100 MG: 100 CAPSULE ORAL at 20:00

## 2022-06-14 RX ADMIN — ALLOPURINOL 100 MG: 100 TABLET ORAL at 08:57

## 2022-06-14 RX ADMIN — METOPROLOL TARTRATE 12.5 MG: 25 TABLET ORAL at 08:56

## 2022-06-14 RX ADMIN — ROPINIROLE HYDROCHLORIDE 0.25 MG: 0.25 TABLET, FILM COATED ORAL at 13:59

## 2022-06-14 RX ADMIN — ENTACAPONE 200 MG: 200 TABLET, FILM COATED ORAL at 18:29

## 2022-06-14 RX ADMIN — GABAPENTIN 100 MG: 100 CAPSULE ORAL at 13:59

## 2022-06-14 RX ADMIN — ATORVASTATIN CALCIUM 40 MG: 80 TABLET, FILM COATED ORAL at 20:00

## 2022-06-14 RX ADMIN — PANTOPRAZOLE SODIUM 40 MG: 40 TABLET, DELAYED RELEASE ORAL at 08:56

## 2022-06-14 RX ADMIN — ACETAMINOPHEN 650 MG: 325 TABLET ORAL at 09:17

## 2022-06-14 RX ADMIN — GABAPENTIN 100 MG: 100 CAPSULE ORAL at 08:57

## 2022-06-14 RX ADMIN — CARBIDOPA AND LEVODOPA 1 TABLET: 25; 100 TABLET ORAL at 13:59

## 2022-06-14 RX ADMIN — SODIUM CHLORIDE, PRESERVATIVE FREE 10 ML: 5 INJECTION INTRAVENOUS at 08:59

## 2022-06-14 RX ADMIN — ROPINIROLE HYDROCHLORIDE 0.25 MG: 0.25 TABLET, FILM COATED ORAL at 20:00

## 2022-06-14 RX ADMIN — ENTACAPONE 200 MG: 200 TABLET, FILM COATED ORAL at 13:59

## 2022-06-14 RX ADMIN — ENTACAPONE 200 MG: 200 TABLET, FILM COATED ORAL at 20:00

## 2022-06-14 RX ADMIN — CLOPIDOGREL 75 MG: 75 TABLET, FILM COATED ORAL at 08:57

## 2022-06-14 RX ADMIN — CARBIDOPA AND LEVODOPA 1 TABLET: 25; 100 TABLET ORAL at 19:59

## 2022-06-14 RX ADMIN — ENTACAPONE 200 MG: 200 TABLET, FILM COATED ORAL at 08:56

## 2022-06-14 RX ADMIN — AMITRIPTYLINE HYDROCHLORIDE 25 MG: 25 TABLET, FILM COATED ORAL at 20:00

## 2022-06-14 RX ADMIN — METOPROLOL TARTRATE 12.5 MG: 25 TABLET ORAL at 19:59

## 2022-06-14 RX ADMIN — ISOSORBIDE MONONITRATE 30 MG: 30 TABLET ORAL at 08:57

## 2022-06-14 NOTE — PROGRESS NOTES
Nutrition Assessment     Type and Reason for Visit: Initial (LOS)    Nutrition Recommendations/Plan:   Continue current diet. Encourage/monitor PO intakes as tolerated. Monitor plan of care. Malnutrition Assessment:  Malnutrition Status: No malnutrition    Nutrition Assessment:  Pt seen/chart reviewed for length of stay. Pt admitted with c/o chest.  PMH includes: CKD, HERD, Parkinson's Disease. Pt reports having a good appetite and eating % of meals. Noted per chart review, recorded PO intakes of % of meals and pt taking fluids well. Weight history reviewed - weight loss of 5% x 2 months noted. Labs/Meds reviewed. Estimated Daily Nutrient Needs:  Energy (kcal):  1900 kcals/day Weight Used for Energy Requirements: Current     Protein (g):  80 gm pro/day Weight Used for Protein Requirements: Ideal        Fluid (ml/day):  25 mL/kg = 1900 mL/day or per MD Method Used for Fluid Requirements: ml/Kg    Nutrition Related Findings:   Labs/Meds reviewed. Last BM 6/14. Wound Type: None    Current Nutrition Therapies:    ADULT DIET;  Regular; Low Fat/Low Chol/High Fiber/OMKAR; No Caffeine    Anthropometric Measures:  · Height: 5' 7\" (170.2 cm)  · Current Body Wt: 168 lb 14 oz (76.6 kg)   · BMI: 26.4    Nutrition Diagnosis:   No nutrition diagnosis at this time    Nutrition Interventions:   Food and/or Nutrient Delivery: Continue Current Diet  Nutrition Education/Counseling: No recommendation at this time  Coordination of Nutrition Care: Continue to monitor while inpatient       Goals:  Previous Goal Met:  (Goal Set)  Goals: PO intake 75% or greater,prior to discharge       Nutrition Monitoring and Evaluation:   Behavioral-Environmental Outcomes: None Identified  Food/Nutrient Intake Outcomes: Food and Nutrient Intake  Physical Signs/Symptoms Outcomes: Biochemical Data,GI Status,Fluid Status or Edema,Weight,Skin    Discharge Planning:    Continue current diet     José Miguel Dowling RD, LD  Contact: [phone]

## 2022-06-14 NOTE — PROGRESS NOTES
[facility]  Office: [phone]  Segundo Mcconnell, DO, Danie Frank, DO, Kelin Barbosa, DO, Lydia White, DO, Bk Beck MD, Ananya Francis MD, Malina Can MD, Juan Diego Sarabia MD, Cristhian Kaur MD, Pebbles Doss MD, Rich Munoz MD, Paresh Munoz, , Elizabeth Pang MD,  Claudetta Shilling, DO, Anila Rai MD, Chantel Kwok MD, Julia Apple MD, Yudith Shannon, DO, Celeste Castillo MD, Eduardo Alvarenga MD, Felicia Garber DO, Tom Stewart MD, Shen Esquivel, [facility], [facility], [facility], Kath Sanches, CNP, Sydney Sykes, CNP, Glenroy Samayoa, CNP, Rita Cook, CNP, Susi Lackey PA-C, Binu Maloney, RON, Zakia Elias, CNP, Shama Barroso, CNP, Maria Dolores Caba, CNP, Berto Hidalgo, CNS, Colin Forde, [facility], Peter Vela, CNP, Vidhya Sharma, CNP, Ana Acuna, [address] [place] [address]    Progress Note    6/14/2022    2:21 PM    Name:   Placido Goldmann  MRN:     [id number]     Acct:      [de-identified]   Room:   2022/2022-01   Day:  7  Admit Date:  6/7/2022  9:22 AM    PCP:   Claudetta Rainbow, APRN - CNP  Code Status:  Full Code    Subjective:     C/C:   Chief Complaint   Patient presents with    Chest Pain     Interval History Status: not changed. Patient seen at bedside  Complains of reproducible pain left side ribs  No sob  Feels better  States that he had a bowel moment this am  Hr 50-60, bp stable    Brief History: This is an 21-year-old male with a past medical history listed below who presents to the emergency department with a chief complaint of sharp chest pain 7-8/10 radiating to his back with shortness of breath and dizziness.  Also endorsing some left knee pain after recent steroid injection with his pain management provider at [facility] pain management clinic on 6/6/2022. Initial troponin was slightly elevated from his baseline at 41 uptrending to 59 without significant EKG changes.  Patient was given nitro and started on a heparin infusion in the emergency department and cardiology was consulted, cardiac cath on 6/8/2022 showed single vessel CAD with patent stent in LAD. Review of Systems:     Constitutional:  negative for chills, fevers, sweats  Respiratory:  negative for cough, dyspnea on exertion, shortness of breath, wheezing  Cardiovascular: positive for reproducible chest pain, no chest pressure/discomfort, lower extremity edema, palpitations  Gastrointestinal:  negative for abdominal pain, constipation, diarrhea, nausea, vomiting  Neurological:  negative for dizziness, headache    Medications: Allergies:     Allergies   Allergen Reactions    Dye [Iodides]      pain    Aspirin      Brain bleed         Current Meds:   Scheduled Meds:    gabapentin  100 mg Oral TID    amitriptyline  25 mg Oral Nightly    lidocaine  1 patch TransDERmal Daily    sodium chloride flush  5-40 mL IntraVENous 2 times per day    isosorbide mononitrate  30 mg Oral Daily    allopurinol  100 mg Oral Daily    atorvastatin  40 mg Oral Nightly    carbidopa-levodopa  1 tablet Oral 4x Daily    clopidogrel  75 mg Oral Daily    entacapone  200 mg Oral 4x Daily    pantoprazole  40 mg Oral QAM AC    magnesium oxide  400 mg Oral Daily    rOPINIRole  0.25 mg Oral TID    metoprolol tartrate  12.5 mg Oral BID     Continuous Infusions:    sodium chloride       PRN Meds: calcium carbonate, sodium chloride flush, sodium chloride, sodium chloride flush, potassium chloride **OR** potassium alternative oral replacement **OR** potassium chloride, magnesium sulfate, ondansetron **OR** ondansetron, acetaminophen **OR** acetaminophen, magnesium hydroxide, nitroGLYCERIN    Data:     Past Medical History:   has a past medical history of 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 Social History:   reports that he has quit smoking. He has never used smokeless tobacco. He reports that he does not drink alcohol and does not use drugs. Family History:   Family History   Problem Relation Age of Onset    No Known Problems Mother     No Known Problems Father        Vitals:  /67   Pulse 51   Temp 97.8 °F (36.6 °C) (Oral)   Resp 19   Ht 5' 7\" (1.702 m)   Wt 168 lb 14 oz (76.6 kg)   SpO2 96%   BMI 26.45 kg/m²   Temp (24hrs), Av °F (36.7 °C), Min:97.5 °F (36.4 °C), Max:98.6 °F (37 °C)    Recent Labs     22   POCGLU 172*       I/O (24Hr): Intake/Output Summary (Last 24 hours) at 2022 1421  Last data filed at 2022 0700  Gross per 24 hour   Intake 500 ml   Output 2290 ml   Net -1790 ml       Labs:  Hematology:No results for input(s): WBC, RBC, HGB, HCT, MCV, MCH, MCHC, RDW, PLT, MPV, SEDRATE, CRP, INR, DDIMER, GA1DVMWZ, LABABSO in the last 72 hours. Invalid input(s): PT  Chemistry:No results for input(s): NA, K, CL, CO2, GLUCOSE, BUN, CREATININE, MG, ANIONGAP, LABGLOM, GFRAA, CALCIUM, CAION, PHOS, PSA, PROBNP, TROPHS, CKTOTAL, CKMB, CKMBINDEX, MYOGLOBIN, DIGOXIN, LACTACIDWB in the last 72 hours. Recent Labs     22   POCGLU 172*     ABG:No results found for: POCPH, PHART, PH, POCPCO2, IQH8IAA, PCO2, POCPO2, PO2ART, PO2, POCHCO3, SBQ2KPR, HCO3, NBEA, PBEA, BEART, BE, THGBART, THB, VPO1XVC, QVUV3AHV, I5QIFQCR, O2SAT, FIO2  Lab Results   Component Value Date/Time    SPECIAL RFA 16ML 2022 10:46 AM     Lab Results   Component Value Date/Time    CULTURE NO GROWTH 5 DAYS 2022 10:46 AM       Radiology:  No results found.     Physical Examination:        General appearance:  alert, cooperative and no distress  Mental Status:  oriented to person, place and time and normal affect  Lungs:  clear to auscultation bilaterally, normal effort  Heart:  regular rate and rhythm, no murmur  Abdomen:  soft, nontender, nondistended, normal bowel sounds, no masses, hepatomegaly, splenomegaly  Extremities:  no edema, redness, tenderness in the calves  Skin:  no gross lesions, rashes, induration    Assessment:        Hospital Problems           Last Modified POA    Chronic pain of multiple joints (Chronic) 6/9/2022 Yes    Gout 6/9/2022 Yes    Nerve pain 6/10/2022 Yes    CKD (chronic kidney disease) stage 3, GFR 30-59 ml/min ([facility] 6/9/2022 Yes    Depression 6/9/2022 Yes    Hyperlipidemia 6/9/2022 Yes    Essential hypertension 6/9/2022 Yes    Parkinson disease ([address] 6/9/2022 Yes    Chronic pain of left knee 6/9/2022 Yes    Overweight (BMI 25.0-29.9) 6/9/2022 Yes          Plan:        Continue lidocaine patch  Continue gabapentin  Continue parkinsonism  Continue lipitor and plavix for CAD, no new blockages, previous stent patent  Continue requip    Await placement    Ellen Arellano MD  6/14/2022  2:21 PM

## 2022-06-14 NOTE — PROGRESS NOTES
Physical Therapy  Facility/Department: [facility] 2  Daily Treatment Note    Name: Alf Lara  : 1940  MRN: [id number]  Date of Service: 2022    Discharge Recommendations:  Patient would benefit from continued therapy after discharge          Patient Diagnosis(es): The encounter diagnosis was NSTEMI (non-ST elevated myocardial infarction) ([facility] 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 ([facility] CKD (chronic kidney disease) stage 2, GFR 60-89 ml/min, CKD (chronic kidney disease) stage 3, GFR 30-59 ml/min ([facility]), Diverticulosis of colon, GERD (gastroesophageal reflux disease), Impaired renal function, MRSA (methicillin resistant staph aureus) culture positive, Osteoarthritis of knee, Parkinson disease ([facility] Proteinuria, Skull fracture ([facility] Temporary loss of eyesight, and Tubular adenoma of colon. Past Surgical History:  has a past surgical history that includes Colonoscopy (2012); shoulder surgery (Right); pr colsc flx w/rmvl of tumor polyp lesion snare tq (N/A, 2017); and Colonoscopy (2017). Assessment   Body Structures, Functions, Activity Limitations Requiring Skilled Therapeutic Intervention: Decreased functional mobility ; Decreased strength;Decreased endurance;Decreased balance; Increased pain  Assessment: Pt required Gustavo for supine to sit along with increased time and effort. Pt ambulated 60ft with RW and CGA. Pt unsteady and high fall risk. Recommend continued PT after d/c to address functional deficits and regain prior level of independence.   Therapy Prognosis: Good  Activity Tolerance  Activity Tolerance: Patient limited by fatigue;Patient limited by endurance     Plan   Plan  Plan:  (5-6x/wk)  Current Treatment Recommendations: Strengthening,Balance training,Functional mobility training,Transfer training,Gait training,Stair training,Patient/Caregiver education & training,Safety education & training,Home exercise program,Equipment evaluation, education, & procurement,Therapeutic activities,Endurance training  Safety Devices  Type of Devices: Gait belt,Call light within reach,Chair alarm in place,Nurse notified,Left in chair,Patient at risk for falls  Restraints  Restraints Initially in Place: No     Restrictions  Restrictions/Precautions  Restrictions/Precautions: Contact Precautions,Up as Tolerated,Fall Risk  Required Braces or Orthoses?: No     Subjective   General  Chart Reviewed: Yes  Response To Previous Treatment: Patient with no complaints from previous session. Family / Caregiver Present: No  General Comment  Comments: Pt retired to recliner with B LEs elevated with call light and chair alarm. RN notified. Subjective  Subjective: Pt supine upon arrival.  RN and pt agreeable to Pt. Pt pleasant and cooperative. Vision/Hearing  Vision  Vision: Impaired  Vision Exceptions: Wears glasses at all times    Cognition   Orientation  Overall Orientation Status: Within Functional Limits  Cognition  Overall Cognitive Status: Exceptions  Arousal/Alertness: Appropriate responses to stimuli  Following Commands: Follows one step commands consistently; Follows multistep commands with repitition; Follows multistep commands with increased time  Attention Span: Attends with cues to redirect  Initiation: Requires cues for some  Sequencing: Requires cues for some     Objective      Bed mobility  Rolling to Left: Contact guard assistance  Supine to Sit: Minimal assistance  Sit to Supine: Unable to assess (Pt retired to recliner.)  Bed Mobility Comments: HOB raised, use of rails, Gustavo for trunk and BLE progression. Increased time and effort. Transfers  Sit to Stand: Contact guard assistance  Stand to sit: Contact guard assistance  Comment: Assessed to RW with verbal cues for hand placement with fair return.   Ambulation  Surface: level tile  Device: Rolling Walker  Assistance: Contact guard assistance  Quality of Gait: unsteady, flexed posture, B knee flex, narrow SHABBIR  Gait Deviations: Shuffles; Slow Ewa;Decreased step length;Decreased step height  Distance: 60 ft, seated rest period, 60 ft  Comments: Pt needed seated rest period in between bouts d/t fatigue; pt needed assistance with position of RW during turns. Balance  Posture: Fair  Sitting - Static: Good  Sitting - Dynamic: Good;-  Standing - Static: Fair  Standing - Dynamic: Fair  Comments: assessed with RW  Exercise Treatment: Supine Exercises: Ankle Pumps, Heel Slides, Hip ABD/ADD,  Quad Sets Reps:  x 10-20 reps with tactile and verbal cues for technique. OutComes Score    AM-PAC Score  AM-PAC Inpatient Mobility Raw Score : 18 (06/14/22 1105)  AM-PAC Inpatient T-Scale Score : 43.63 (06/14/22 1105)  Mobility Inpatient CMS 0-100% Score: 46.58 (06/14/22 1105)  Mobility Inpatient CMS G-Code Modifier : CK (06/14/22 1105)          Goals  Short Term Goals  Time Frame for Short term goals: 14 visits  Short term goal 1: Pt will ambulate 300 feet with least restrictive device and supervision to increase functional independence. Short term goal 2: Pt will demonstrate good- standing balance to decrease fall risk. Short term goal 3: Pt will perform sit<>stand transfer with supervision to increase functional independence. Short term goal 4: Pt will tolerate a 35 minute therapy session to promote increased endurance. Short term goal 5: Pt will negotiate 5 stairs with bilateral handrails and SBA to allow the pt to enter prior living arrangements. Education  Patient Education  Education Given To: Patient  Education Provided: Role of Therapy;Transfer Training;Equipment;Plan of Care; Fall Prevention Strategies  Education Provided Comments: RW position during turns and supine exercisess  Education Method: Verbal  Barriers to Learning: None  Education Outcome: Verbalized understanding;Continued education needed      Therapy Time   Individual Concurrent Group Co-treatment   Time In 5048         Time Out 0948         Minutes 27         Timed Code Treatment Minutes: BRANDT Luke, PTA

## 2022-06-14 NOTE — PLAN OF CARE
Problem: Discharge Planning  Goal: Discharge to home or other facility with appropriate resources  Outcome: Progressing  Flowsheets (Taken 6/13/2022 2000)  Discharge to home or other facility with appropriate resources: Identify barriers to discharge with patient and caregiver     Problem: Pain  Goal: Verbalizes/displays adequate comfort level or baseline comfort level  Outcome: Progressing     Problem: Chronic Conditions and Co-morbidities  Goal: Patient's chronic conditions and co-morbidity symptoms are monitored and maintained or improved  Outcome: Progressing  Flowsheets (Taken 6/13/2022 2000)  Care Plan - Patient's Chronic Conditions and Co-Morbidity Symptoms are Monitored and Maintained or Improved: Monitor and assess patient's chronic conditions and comorbid symptoms for stability, deterioration, or improvement     Problem: Safety - Adult  Goal: Free from fall injury  Outcome: Progressing

## 2022-06-15 LAB
ABSOLUTE EOS #: 0.33 K/UL (ref 0–0.44)
ABSOLUTE IMMATURE GRANULOCYTE: 0.13 K/UL (ref 0–0.3)
ABSOLUTE LYMPH #: 1.69 K/UL (ref 1.1–3.7)
ABSOLUTE MONO #: 0.82 K/UL (ref 0.1–1.2)
ANION GAP SERPL CALCULATED.3IONS-SCNC: 10 MMOL/L (ref 9–17)
BASOPHILS # BLD: 1 % (ref 0–2)
BASOPHILS ABSOLUTE: 0.05 K/UL (ref 0–0.2)
BUN BLDV-MCNC: 18 MG/DL (ref 8–23)
CALCIUM IONIZED: 1.13 MMOL/L (ref 1.13–1.33)
CALCIUM SERPL-MCNC: 8.7 MG/DL (ref 8.6–10.4)
CHLORIDE BLD-SCNC: 98 MMOL/L (ref 98–107)
CO2: 23 MMOL/L (ref 20–31)
CREAT SERPL-MCNC: 1.27 MG/DL (ref 0.7–1.2)
EKG ATRIAL RATE: 49 BPM
EKG P AXIS: 24 DEGREES
EKG P-R INTERVAL: 188 MS
EKG Q-T INTERVAL: 458 MS
EKG QRS DURATION: 116 MS
EKG QTC CALCULATION (BAZETT): 413 MS
EKG R AXIS: 40 DEGREES
EKG T AXIS: -14 DEGREES
EKG VENTRICULAR RATE: 49 BPM
EOSINOPHILS RELATIVE PERCENT: 4 % (ref 1–4)
GFR AFRICAN AMERICAN: >60 ML/MIN
GFR NON-AFRICAN AMERICAN: 54 ML/MIN
GFR SERPL CREATININE-BSD FRML MDRD: ABNORMAL ML/MIN/{1.73_M2}
GLUCOSE BLD-MCNC: 228 MG/DL (ref 70–99)
HCT VFR BLD CALC: 42.2 % (ref 40.7–50.3)
HEMOGLOBIN: 14.4 G/DL (ref 13–17)
IMMATURE GRANULOCYTES: 2 %
LYMPHOCYTES # BLD: 22 % (ref 24–43)
MAGNESIUM: 2 MG/DL (ref 1.6–2.6)
MCH RBC QN AUTO: 32.4 PG (ref 25.2–33.5)
MCHC RBC AUTO-ENTMCNC: 34.1 G/DL (ref 28.4–34.8)
MCV RBC AUTO: 94.8 FL (ref 82.6–102.9)
MONOCYTES # BLD: 11 % (ref 3–12)
NRBC AUTOMATED: 0 PER 100 WBC
PDW BLD-RTO: 14.3 % (ref 11.8–14.4)
PLATELET # BLD: 182 K/UL (ref 138–453)
PMV BLD AUTO: 9.6 FL (ref 8.1–13.5)
POTASSIUM SERPL-SCNC: 4.5 MMOL/L (ref 3.7–5.3)
RBC # BLD: 4.45 M/UL (ref 4.21–5.77)
REASON FOR REJECTION: NORMAL
SEG NEUTROPHILS: 60 % (ref 36–65)
SEGMENTED NEUTROPHILS ABSOLUTE COUNT: 4.59 K/UL (ref 1.5–8.1)
SODIUM BLD-SCNC: 131 MMOL/L (ref 135–144)
WBC # BLD: 7.6 K/UL (ref 3.5–11.3)
ZZ NTE CLEAN UP: ORDERED TEST: NORMAL
ZZ NTE WITH NAME CLEAN UP: SPECIMEN SOURCE: NORMAL

## 2022-06-15 PROCEDURE — 83735 ASSAY OF MAGNESIUM: CPT

## 2022-06-15 PROCEDURE — 1200000000 HC SEMI PRIVATE

## 2022-06-15 PROCEDURE — 2580000003 HC RX 258: Performed by: STUDENT IN AN ORGANIZED HEALTH CARE EDUCATION/TRAINING PROGRAM

## 2022-06-15 PROCEDURE — 36415 COLL VENOUS BLD VENIPUNCTURE: CPT

## 2022-06-15 PROCEDURE — 93005 ELECTROCARDIOGRAM TRACING: CPT | Performed by: STUDENT IN AN ORGANIZED HEALTH CARE EDUCATION/TRAINING PROGRAM

## 2022-06-15 PROCEDURE — 97110 THERAPEUTIC EXERCISES: CPT

## 2022-06-15 PROCEDURE — 6370000000 HC RX 637 (ALT 250 FOR IP): Performed by: STUDENT IN AN ORGANIZED HEALTH CARE EDUCATION/TRAINING PROGRAM

## 2022-06-15 PROCEDURE — 6370000000 HC RX 637 (ALT 250 FOR IP): Performed by: INTERNAL MEDICINE

## 2022-06-15 PROCEDURE — 99231 SBSQ HOSP IP/OBS SF/LOW 25: CPT | Performed by: STUDENT IN AN ORGANIZED HEALTH CARE EDUCATION/TRAINING PROGRAM

## 2022-06-15 PROCEDURE — 85025 COMPLETE CBC W/AUTO DIFF WBC: CPT

## 2022-06-15 PROCEDURE — 93010 ELECTROCARDIOGRAM REPORT: CPT | Performed by: INTERNAL MEDICINE

## 2022-06-15 PROCEDURE — 97535 SELF CARE MNGMENT TRAINING: CPT

## 2022-06-15 PROCEDURE — 80048 BASIC METABOLIC PNL TOTAL CA: CPT

## 2022-06-15 PROCEDURE — 82330 ASSAY OF CALCIUM: CPT

## 2022-06-15 PROCEDURE — 97530 THERAPEUTIC ACTIVITIES: CPT

## 2022-06-15 RX ORDER — GABAPENTIN 100 MG/1
100 CAPSULE ORAL 3 TIMES DAILY
Qty: 90 CAPSULE | Refills: 0 | Status: SHIPPED | OUTPATIENT
Start: 2022-06-15 | End: 2022-08-23

## 2022-06-15 RX ADMIN — ALLOPURINOL 100 MG: 100 TABLET ORAL at 08:04

## 2022-06-15 RX ADMIN — PANTOPRAZOLE SODIUM 40 MG: 40 TABLET, DELAYED RELEASE ORAL at 08:03

## 2022-06-15 RX ADMIN — SODIUM CHLORIDE, PRESERVATIVE FREE 10 ML: 5 INJECTION INTRAVENOUS at 20:34

## 2022-06-15 RX ADMIN — GABAPENTIN 100 MG: 100 CAPSULE ORAL at 20:30

## 2022-06-15 RX ADMIN — CARBIDOPA AND LEVODOPA 1 TABLET: 25; 100 TABLET ORAL at 20:30

## 2022-06-15 RX ADMIN — CARBIDOPA AND LEVODOPA 1 TABLET: 25; 100 TABLET ORAL at 13:27

## 2022-06-15 RX ADMIN — ENTACAPONE 200 MG: 200 TABLET, FILM COATED ORAL at 17:55

## 2022-06-15 RX ADMIN — GABAPENTIN 100 MG: 100 CAPSULE ORAL at 08:03

## 2022-06-15 RX ADMIN — CARBIDOPA AND LEVODOPA 1 TABLET: 25; 100 TABLET ORAL at 08:04

## 2022-06-15 RX ADMIN — CLOPIDOGREL 75 MG: 75 TABLET, FILM COATED ORAL at 08:04

## 2022-06-15 RX ADMIN — ROPINIROLE HYDROCHLORIDE 0.25 MG: 0.25 TABLET, FILM COATED ORAL at 20:30

## 2022-06-15 RX ADMIN — ACETAMINOPHEN 650 MG: 325 TABLET ORAL at 08:05

## 2022-06-15 RX ADMIN — ATORVASTATIN CALCIUM 40 MG: 80 TABLET, FILM COATED ORAL at 20:30

## 2022-06-15 RX ADMIN — METOPROLOL TARTRATE 12.5 MG: 25 TABLET ORAL at 08:03

## 2022-06-15 RX ADMIN — ENTACAPONE 200 MG: 200 TABLET, FILM COATED ORAL at 08:03

## 2022-06-15 RX ADMIN — ROPINIROLE HYDROCHLORIDE 0.25 MG: 0.25 TABLET, FILM COATED ORAL at 08:03

## 2022-06-15 RX ADMIN — CARBIDOPA AND LEVODOPA 1 TABLET: 25; 100 TABLET ORAL at 17:55

## 2022-06-15 RX ADMIN — MAGNESIUM GLUCONATE 500 MG ORAL TABLET 400 MG: 500 TABLET ORAL at 08:03

## 2022-06-15 RX ADMIN — ENTACAPONE 200 MG: 200 TABLET, FILM COATED ORAL at 20:30

## 2022-06-15 RX ADMIN — ROPINIROLE HYDROCHLORIDE 0.25 MG: 0.25 TABLET, FILM COATED ORAL at 13:27

## 2022-06-15 RX ADMIN — GABAPENTIN 100 MG: 100 CAPSULE ORAL at 13:27

## 2022-06-15 RX ADMIN — AMITRIPTYLINE HYDROCHLORIDE 25 MG: 25 TABLET, FILM COATED ORAL at 20:30

## 2022-06-15 RX ADMIN — ISOSORBIDE MONONITRATE 30 MG: 30 TABLET ORAL at 08:03

## 2022-06-15 RX ADMIN — ENTACAPONE 200 MG: 200 TABLET, FILM COATED ORAL at 13:27

## 2022-06-15 RX ADMIN — SODIUM CHLORIDE, PRESERVATIVE FREE 10 ML: 5 INJECTION INTRAVENOUS at 08:40

## 2022-06-15 NOTE — DISCHARGE INSTR - COC
Continuity of Care Form    Patient Name: Mila Youngblood   :  1940  MRN:  [id number]    Admit date:  2022  Discharge date:  ***    Code Status Order: Full Code   Advance Directives:      Admitting Physician:  No admitting provider for patient encounter.   PCP: JW Thompson CNP    Discharging Nurse: [facility] Unit/Room#:   Discharging Unit Phone Number: ***    Emergency Contact:   Extended Emergency Contact Information  Primary Emergency Contact: Jhony Gill  Address: n/a   [address] Phone: [phone]  Relation: Child  Secondary Emergency Contact: Dave Mccarthy  Address: [place] Phone: [phone]  Mobile Phone: [phone]  Relation: Other    Past Surgical History:  Past Surgical History:   Procedure Laterality Date    COLONOSCOPY  2012    poor prep, tubular adenoma    COLONOSCOPY  2017    diverticulosis, multiple polyps as described, spot marking done, pathology-tubular adenoma x 4    MS COLSC FLX W/RMVL OF TUMOR POLYP LESION SNARE TQ N/A 2017    COLONOSCOPY POLYPECTOMY SNARE/COLD BIOPSY with tatooing and apc transverse colon performed by Augusta Levine MD at [id number] [place] [address] Right     rotator cuff       Immunization History:   Immunization History   Administered Date(s) Administered    COVID-19, Pfizer Purple top, DILUTE for use, 12+ yrs, 30mcg/0.3mL dose 2021, 2021, 2021    Influenza 2012    Influenza, High Dose (Fluzone 65 yrs and older) 2014, 2016, 2017, 2017    Influenza, Quadv, adjuvanted, 65 yrs +, IM, PF (Fluad) 10/19/2020    Influenza, Triv, inactivated, subunit, adjuvanted, IM (Fluad 65 yrs and older) 2018, 10/06/2019    Pneumococcal Conjugate 13-valent ([facility]) 2017, 2018, 2018    Pneumococcal Polysaccharide (Anbsgmnmo22) 2012, 2015       Active Problems:  Patient Active Problem List Diagnosis Code    Temporary loss of eyesight H53.129    Impaired renal function N28.9    Syncope : posterior circulation stroke vs Neurocardiogenic syncope  R55    Intracranial bleed : traumatic left sub-dural hematoma ,managed conservatively in 2011 I62.9    Headache R51.9    CKD (chronic kidney disease) stage 3, GFR 30-59 ml/min ([facility]) N18.30    Depression F32. A    Hyperlipidemia E78.5    Microhematuria R31.29    Microalbuminuria R80.9    Essential hypertension I10    Generalized abdominal pain R10.84    Tubular adenoma of colon D12.6    Diverticulosis of colon K57.30    History of skull fracture Z87.81    Nausea R11.0    Pure hypercholesterolemia E78.00    Prediabetes R73.03    Parkinson disease ([facility] G20    Chronic post-traumatic headache, not intractable G44.329    Fall (on) (from) other stairs and steps, initial encounter W10. 8XXA    Strain of iliopsoas muscle, initial encounter S76.919A    Chronic pain of left knee M25.562, G89.29    Closed fracture of second toe of right foot S92.501A    Closed fracture of second toe of left foot S92.502A    Abnormal ECG R94.31    Cerebrovascular accident [facility]) I63.9    Chest pain, unspecified R07.9    Hematoma of scalp S00. 03XA    Left ventricular hypertrophy I51.7    Mild aortic valve regurgitation I35.1    Pulmonary hypertension, mild ([facility]) I27.20    Lumbar radiculopathy M54.16    Dizziness R42    Hyponatremia E87.1    Vomiting R11.10    General weakness R53.1    Overweight (BMI 25.0-29. 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    Chronic renal disease, stage III [facility]) [129870] N18.30    Chronic pain of multiple joints M25.50, G89.29    Multiple comorbid conditions R69    Gout M10.9    Nerve pain M79.2       Isolation/Infection:   Isolation            Contact          Patient Infection Status       Infection Onset Added Last Indicated Last Indicated By Review Planned Expiration Resolved Resolved By    MRSA  09/25/15 09/25/15 Charles Oates RN        Leg 9/23/15    Resolved    COVID-19 (Rule Out) 09/01/21 09/01/21 09/01/21 COVID-19, Rapid (Ordered)   09/01/21 Rule-Out Test Resulted            Nurse Assessment:  Last Vital Signs: /70   Pulse 51   Temp 98.2 °F (36.8 °C) (Oral)   Resp 16   Ht 5' 7\" (1.702 m)   Wt 168 lb 14 oz (76.6 kg)   SpO2 97%   BMI 26.45 kg/m²     Last documented pain score (0-10 scale): Pain Level: 0  Last Weight:   Wt Readings from Last 1 Encounters:   06/12/22 168 lb 14 oz (76.6 kg)     Mental Status:  {IP PT MENTAL STATUS:20030}    IV Access:  { JOVANNA IV ACCESS:309303237}    Nursing Mobility/ADLs:  Walking   {P DME FHUK:222930457}  Transfer  {[facility] DME CBXO:378366447}  Bathing  {[facility] DME PSTT:959438979}  Dressing  {P DME BDEK:940929049}  Toileting  {P DME MGIE:194290952}  Feeding  {[facility] DME UFCU:471698474}  Med Admin  {[facility] DME HJZD:165991353}  Med Delivery   { JOVANNA MED Delivery:834593280}    Wound Care Documentation and Therapy:        Elimination:  Continence: Bowel: {YES / RF:67453}  Bladder: {YES / WR:57627}  Urinary Catheter: {Urinary Catheter:558380714}   Colostomy/Ileostomy/Ileal Conduit: {YES / WZ:43615}       Date of Last BM: ***    Intake/Output Summary (Last 24 hours) at 6/15/2022 1408  Last data filed at 6/15/2022 0613  Gross per 24 hour   Intake 800 ml   Output 2800 ml   Net -2000 ml     I/O last 3 completed shifts:   In: 800 [P.O.:800]  Out: 4450 [Urine:4450]    Safety Concerns:     [employer] Safety Concerns:748855806}    Impairments/Disabilities:      [employer] Impairments/Disabilities:928145280}    Nutrition Therapy:  Current Nutrition Therapy:   [employer] Diet List:753156394}    Routes of Feeding: {CHP DME Other Feedings:327561499}  Liquids: {Slp liquid thickness:58316}  Daily Fluid Restriction: {CHP DME Yes amt example:645930119}  Last Modified Barium Swallow with Video (Video Swallowing Test): {Done Not Done RQRE:245537602}    Treatments at

## 2022-06-15 NOTE — PROGRESS NOTES
Physical Therapy  Facility/Department: [facility] CAR 2  Physical Therapy daily treatment note    Name: Donna Bryant  : 1940  MRN: [id number]  Date of Service: 6/15/2022    Discharge Recommendations:  Patient would benefit from continued therapy after discharge   PT Equipment Recommendations  Equipment Needed: No      Patient Diagnosis(es): The encounter diagnosis was NSTEMI (non-ST elevated myocardial infarction) ([facility] [address] 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 Past Surgical History:  has a past surgical history that includes Colonoscopy (2012); shoulder surgery (Right); pr colsc flx w/rmvl of tumor polyp lesion snare tq (N/A, 2017); and Colonoscopy (2017). Assessment   Body Structures, Functions, Activity Limitations Requiring Skilled Therapeutic Intervention: Decreased functional mobility ; Decreased strength;Decreased endurance;Decreased balance; Increased pain  Assessment: Pt stood x 1 min wtih Rw and CGA. LImited by c/o dizziness and \"i just don't feel right\".  Pt is high fall risk and would benefit from contiuned PT after d/c to address deficits  Therapy Prognosis: Good  Activity Tolerance  Activity Tolerance: Patient limited by fatigue;Patient limited by endurance     Plan   Plan  Plan:  (5-6)  Current Treatment Recommendations: Strengthening,Balance training,Functional mobility training,Transfer training,Gait training,Stair training,Patient/Caregiver education & training,Safety education & training,Home exercise program,Equipment evaluation, education, & procurement,Therapeutic activities,Endurance training  Safety Devices  Type of Devices: Gait belt,Call light within reach,Chair alarm in place,Nurse notified,Left in chair,Patient at risk for falls  Restraints  Restraints Initially in Place: No     Restrictions  Restrictions/Precautions  Restrictions/Precautions: Contact Precautions,Up as Tolerated,Fall Risk  Required Braces or Orthoses?: No     Subjective   General  Patient assessed for rehabilitation services?: Yes  Response To Previous Treatment: Patient with no complaints from previous session. Family / Caregiver Present: No  Follows Commands: Within Functional Limits  Subjective  Subjective: Pt supine upon arrival.  RN and pt agreeable to Pt. Pt pleasant and cooperative. Cognition   Orientation  Overall Orientation Status: Within Functional Limits     Objective   Transfers  Sit to Stand: Contact guard assistance  Stand to sit: Contact guard assistance  Comment: Assessed to RW with verbal cues for hand placement with fair return. Ambulation  More Ambulation?: No (upon standing pt states not feeling well, \"i feel foggy, i don't feel right\", with c/o dizziness. Returned to sitting, RN awar. Vitals WNL)     Balance  Posture: Fair  Sitting - Static: Good  Sitting - Dynamic: Good;-  Standing - Static: Fair  Standing - Dynamic: Fair (Pt with increased c/o dizziness in standing)  Comments: assessed with RW   Exercise  Seated LE exercise program: Long Arc Quads, hip abduction/adduction, heel/toe raises, and marches.  Reps: 15x    AM-PAC Score  AM-PAC Inpatient Mobility Raw Score : 18 (06/09/22 1041)  AM-PAC Inpatient T-Scale Score : 43.63 (06/09/22 1041)  Mobility Inpatient CMS 0-100% Score: 46.58 (06/09/22 1041)  Mobility Inpatient CMS G-Code Modifier : CK (06/09/22 1041)          Goals  Short Term Goals  Time Frame for Short term goals: 14 visits  Short term goal 1: Pt will ambulate 300 feet with least restrictive device and supervision to increase functional independence. Short term goal 2: Pt will demonstrate good- standing balance to decrease fall risk. Short term goal 3: Pt will perform sit<>stand transfer with supervision to increase functional independence. Short term goal 4: Pt will tolerate a 35 minute therapy session to promote increased endurance. Short term goal 5: Pt will negotiate 5 stairs with bilateral handrails and SBA to allow the pt to enter prior living arrangements.      Therapy Time   Individual Concurrent Group Co-treatment   Time In 1120         Time Out 1147         Minutes 27         Timed Code Treatment Minutes: Deangelo Danielson [place], [place]

## 2022-06-15 NOTE — CARE COORDINATION
Transitional planning    Call placed to [facility] to follow up on auth status, left vm with request for return call. 1430 Call placed to [facility], no word on auth at this time.

## 2022-06-15 NOTE — PROGRESS NOTES
procurement,Self-Care / ADL,Home management training     Restrictions  Restrictions/Precautions  Restrictions/Precautions: Contact Precautions,Up as Tolerated,Fall Risk  Required Braces or Orthoses?: No    Subjective   General  Patient assessed for rehabilitation services?: Yes  Family / Caregiver Present: No  General Comment  Comments: RN ok'd pt for OT tx this date. Pt agreeable to session and pleasant/cooperative throughout. Pt reports 6/10 pain chest and B shoulders, RN notified. Pain Assessment  Pain Assessment: 0-10  Pain Level:6/10  Pain Location: Chest, B shoulders  Non-Pharmaceutical Pain Intervention(s): Repositioned; Therapeutic presence;Distraction; Ambulation/Increased Activity         Objective           Safety Devices  Type of Devices: Gait belt;Call light within reach; Chair alarm in place;Nurse notified; Left in chair;Patient at risk for falls  Restraints  Restraints Initially in Place: No  Bed Mobility Training  Bed Mobility Training: Yes  Overall Level of Assistance: Additional time;Minimum assistance  Interventions: Verbal cues  Supine to Sit: Minimum assistance;Assist X1;Additional time (Min A with trunk progression.)  Scooting: Minimum assistance; Additional time (min A-CGA + verbal cues to square up to bed.)  Balance  Sitting: With support (S-SBA to ensure stability, sitting EOB ~40 minutes, while engaged in static/dynamic activity.)  Standing: With support (CGA, ~4 minutes, with 1 sitting rest break, RW for support.)  Transfer Training  Transfer Training: Yes  Overall Level of Assistance: Contact-guard assistance; Additional time; Adaptive equipment (RW)  Interventions: Verbal cues; Safety awareness training (CGA + RW, increased time, 4 STS transfers, mild c/o dizziness.)  Sit to Stand: Contact-guard assistance; Additional time; Adaptive equipment  Stand to Sit: Contact-guard assistance; Additional time; Adaptive equipment  Stand Pivot Transfers: Contact-guard assistance; Additional time;Assist X1 ADL  Grooming: Setup; Increased time to complete;Verbal cueing; Independent  Grooming Skilled Clinical Factors: Sitting EOB, I after set up, wash hair/comb hair and brush teeth, increased time with min rest breaks. UE Bathing: Setup;Minimal assistance;Verbal cueing; Increased time to complete;Contact guard assistance  UE Bathing Skilled Clinical Factors: Sitting EOB, min A with back, CGA for front, verbal cues + increased time, set up on tray table. LE Bathing: Setup;Verbal cueing; Increased time to complete;Contact guard assistance  LE Bathing Skilled Clinical Factors: Standing with support of RW, CGA to ensure stability, CGA to complete action of bathing angelina area/bottom/ B LEs using B UE on RW.  UE Dressing: Minimal assistance; Increased time to complete;Verbal cueing;Setup  UE Dressing Skilled Clinical Factors: Seated EOB. Min A to doff/brett gown d/t B shoulder pain. Additional Comments: Pt. c/o mild dizziness at rest and during activity. Pt. demo F tolerance of ADL activity, min rest breaks as needed. Cognition  Overall Cognitive Status: Exceptions  Arousal/Alertness: Appropriate responses to stimuli  Following Commands: Follows one step commands consistently; Follows multistep commands with repitition; Follows multistep commands with increased time  Attention Span: Attends with cues to redirect  Initiation: Requires cues for some  Sequencing: Requires cues for some                  Education Given To: Patient  Education Provided: Precautions; ADL Adaptive Strategies;Transfer Training;Energy Conservation; Fall Prevention Strategies; Equipment;Role of Therapy;Plan of Care  Education Provided Comments: Pt ed on OT role, OT POC, safety awareness, transfer training, DME use, fall prevention, and importance of continued OT.  Pt verbalized good understanding  Education Method: Demonstration;Verbal  Barriers to Learning: None  Education Outcome: Verbalized understanding;Demonstrated understanding;Continued education needed                                                              AM-PAC Score        AM-PAC Inpatient Daily Activity Raw Score: 20 (06/15/22 0952)  AM-PAC Inpatient ADL T-Scale Score : 42.03 (06/15/22 0952)  ADL Inpatient CMS 0-100% Score: 38.32 (06/15/22 2105)  ADL Inpatient CMS G-Code Modifier : CJ (06/15/22 0837)    Goals  Short Term Goals  Time Frame for Short term goals: By discharge, pt will:  Short Term Goal 1: Demo Mod I for functional transfers and functioanl mobility with use of LRD PRN for engagement in ADLs/IADLs  Short Term Goal 2: Demo I for UB ADLs  Short Term Goal 3: Demo Mod I for LB ADLs and toileting tasks  Short Term Goal 4: Demo 8+ min dynamic standing and reaching outside [place] in multiple planes with 0 LOB and SUP for improved balance during ADLs/IADLs  Short Term Goal 5:  Increase activity tolerance to 35+ min for improved participation in ADLs/IADLs       Therapy Time   Individual Concurrent Group Co-treatment   Time In 0845         Time Out 0948         Minutes 63         Timed Code Treatment Minutes: [address], [place]/L

## 2022-06-15 NOTE — DISCHARGE SUMMARY
[facility]  Office: [phone]  Kiki Bennett, DO, Jaspreet Langston, DO, Eugenio Duncan, DO, Rey White, DO, Yenny Wagner MD, Derrell Arceo MD, Linda Mena MD, Yasmin Arenas MD, Tracey Preston MD, Manuel Nuno MD, Abner Nava MD, Miguel A Persaud, DO, Desean Fung MD,  Lili Dumont DO, Carmelina García MD, Kiya Crowe MD, Jem Chavarria MD, Andrew Trevino DO, Diana Asher MD, Lubna Cash MD, Eduardo Moran DO, Rodriguez Mccormick MD, Carlee [facility], [facility], CNP, Elicia Tamayo, CNP, Marito Bowers, CNP, Justyna Talley, CNP, Remy Nixno, CNP, Ike Short PA-C, Bora Shearer, [facility], Nishant Miranda, CNP, Aletha Estrella, CNP, Yocasta Muhammad, CNP, Bladimir Pelletier, CNS, Quan Melchor, [facility], Latia Arciniega, CNP, Slade Badillo, CNP, Margo Oh, [facility]    Discharge Summary     Patient ID: Chacorta Bruno  :  1940   MRN: [id number]     ACCOUNT:  [de-identified]   Patient's PCP: JW Chowdhury CNP  Admit Date: 2022   Discharge Date: 22  Length of Stay: 8  Code Status:  Full Code  Admitting Physician: No admitting provider for patient encounter. Discharge Physician: Kiya Crowe MD     Active Discharge Diagnoses:     Hospital Problem Lists:  Principal Problem (Resolved):    NSTEMI (non-ST elevated myocardial infarction) [facility])  Active Problems:    Chronic pain of multiple joints    Gout    Nerve pain    CKD (chronic kidney disease) stage 3, GFR 30-59 ml/min (HCC)    Depression    Hyperlipidemia    Essential hypertension    Parkinson disease (HCC)    Chronic pain of left knee    Overweight (BMI 25.0-29. 9)  Resolved Problems:    Lactic acidosis      Admission Condition:  fair     Discharged Condition: fair    Hospital Stay:     Hospital Course:  Chacorta Bruno is a 79 y.o. male who was admitted for the management of   NSTEMI (non-ST elevated myocardial infarction) ([facility] [address] , presented to ER with Chest Pain    This is an 23-year-old male  who presents to the emergency department with a chief complaint of sharp chest pain 7-8/10 radiating to his back with shortness of breath and dizziness. Also endorsing some left knee pain after recent steroid injection with his pain management provider at [facility] pain management clinic on 6/6/2022. Initial troponin was slightly elevated from his baseline at 41 uptrending to 59 without significant EKG changes.  Patient was given nitro and started on a heparin infusion in the emergency department and cardiology was consulted, cardiac cath on 6/8/2022 showed single vessel CAD with patent stent in LAD. Significant therapeutic interventions:   Continue lidocaine patch  Continue gabapentin  Continue parkinsonism meds  Continue lipitor and plavix for CAD, no new blockages, previous stent patent  Continue requip    Significant Diagnostic Studies:   Labs / Micro:  CBC:   Lab Results   Component Value Date    WBC 5.8 06/18/2022    RBC 4.09 06/18/2022    RBC 5.14 02/24/2012    HGB 13.2 06/18/2022    HCT 40.4 06/18/2022    MCV 98.8 06/18/2022    MCH 32.3 06/18/2022    MCHC 32.7 06/18/2022    RDW 14.5 06/18/2022     06/18/2022     02/24/2012     BMP:    Lab Results   Component Value Date    GLUCOSE 149 06/18/2022    GLUCOSE 97 04/24/2012     06/18/2022    K 4.4 06/18/2022     06/18/2022    CO2 22 06/18/2022    ANIONGAP 12 06/18/2022    BUN 17 06/18/2022    CREATININE 1.33 06/18/2022    BUNCRER NOT REPORTED 02/16/2022    BUNCRER NOT REPORTED 02/16/2022    CALCIUM 9.0 06/18/2022    LABGLOM 51 06/18/2022    GFRAA >60 06/18/2022    GFR      06/18/2022     HFP:    Lab Results   Component Value Date    PROT 6.1 06/18/2022        Radiology:  No results found.     Consultations:    Consults:     Final Specialist Recommendations/Findings:   IP CONSULT TO CARDIOLOGY  IP CONSULT TO HOSPITALIST  IP CONSULT TO CARDIOLOGY  IP CONSULT TO tablet  Commonly known as: Tylenol 8 Hour Arthritis Pain  Take 1 tablet by mouth every 8 hours as needed for Pain     allopurinol 100 MG tablet  Commonly known as: ZYLOPRIM  TAKE 1 TABLET BY MOUTH ONCE DAILY     amitriptyline 10 MG tablet  Commonly known as: ELAVIL  Take 1 tablet by mouth nightly     atorvastatin 40 MG tablet  Commonly known as: LIPITOR  take 1 tablet by mouth once daily     butalbital-acetaminophen-caffeine -40 MG per tablet  Commonly known as: FIORICET, ESGIC  Take 1 tab prn only for severe headache. Can repeat after 4 hrs if needed. Max 2 tabs/24 hrs. Max 4 tabs/week     carbidopa-levodopa  MG per tablet  Commonly known as: SINEMET  TAKE 1 TABLET BY MOUTH 4 TIMES DAILY     clopidogrel 75 MG tablet  Commonly known as: PLAVIX  TAKE 1 TABLET BY MOUTH ONCE DAILY     diclofenac sodium 1 % Gel  Commonly known as: VOLTAREN  Apply 2 g topically 2 times daily as needed for Pain (joint pain)     entacapone 200 MG tablet  Commonly known as: COMTAN  TAKE ONE (1) TABLET BY MOUTH FOUR (4) TIMES DAILY WITH SINEMET     esomeprazole 20 MG delayed release capsule  Commonly known as: NEXIUM  TAKE 1 CAPSULE BY MOUTH ONCE DAILY IN THE MORNING BEFORE BREAKFAST     magnesium oxide 400 (240 Mg) MG tablet  Commonly known as: MAG-OX  TAKE 1 TABLET BY MOUTH ONCE DAILY     rOPINIRole 0.25 MG tablet  Commonly known as: REQUIP  TAKE ONE (1) TABLET BY MOUTH THREE (3) TIMES DAILY        STOP taking these medications    azithromycin 250 MG tablet  Commonly known as: Zithromax           Where to Get Your Medications      These medications were sent to [address]  [facility] [phone] [facility] [phone]  [address] [place] [id number]    Phone: [phone]   · gabapentin 100 MG capsule     These medications were sent to [facility] [address] [facility], [address] [place] [address]  [address], [address] [address]    Phone: [phone]   · isosorbide mononitrate 30 MG extended release tablet  · lidocaine 4 % external patch  · metoprolol tartrate 25 MG tablet  · nitroGLYCERIN 0.4 MG SL tablet         No discharge procedures on file. Time Spent on discharge is  33 mins in patient examination, evaluation, counseling as well as medication reconciliation, prescriptions for required medications, discharge plan and follow up. Electronically signed by   Kandi Quiñonez MD  6/15/2022  2:10 PM      Thank you J[facility] - FELIPE for the opportunity to be involved in this patient's care.

## 2022-06-15 NOTE — PROGRESS NOTES
[facility]  Office: [phone]  Fabiola Monroe DO, Author Jill DO, Shyanne Wang DO, Regina White DO, Aleida Patel MD, Epifanio Parada MD, Gian Barnes MD, Carolan Schilder, MD, Jayne Lu MD, Jessica Dominique MD, Anjum Miramontes MD, Cinthya Benson DO, Qi Wyatt MD,  Comfort Otto DO, Cindy Goldberg MD, Marcus Matos MD, Yuriy Landry MD, Maikel Hatch DO, En[place] Crowe MD, Elie Hanley MD, Thomas Becerra DO, Nuvia Choi MD, Sangeeta Galarza [facility], [facility], CNP, Joseph Wayne, CNP, Yoni Rodrigez, CNP, Neva Mosley, CNP, Tamar Alston, CNP, Niels Peralta PA-C, Dionisio Braxton, [facility], Mónica Jha, CNP, Marcos Alvarado, CNP, Kat Lugo, CNP, Lamine Mckeon, CNS, Lysbeth Angelucci, [facility], Nadia Owen, CNP, Consuelo Morales, [facility], Caryle Frame, [address] [place] [address]    Progress Note    6/15/2022    2:03 PM    Name:   Derrell Shane  MRN:     [id number]     Acct:      [de-identified]   Room:   2022/2022-01   Day:  8  Admit Date:  6/7/2022  9:22 AM    PCP:   JW Limon CNP  Code Status:  Full Code    Subjective:     C/C:   Chief Complaint   Patient presents with    Chest Pain     Interval History Status: not changed. Patient seen at bedside  Patient continues to complain of intermittent chest pain but states that it is better with lidocaine patch  No acute events overnight    Brief History: This is an 79-year-old male with a past medical history listed below who presents to the emergency department with a chief complaint of sharp chest pain 7-8/10 radiating to his back with shortness of breath and dizziness.  Also endorsing some left knee pain after recent steroid injection with his pain management provider at [facility] pain management clinic on 6/6/2022. Initial troponin was slightly elevated from his baseline at 41 uptrending to 59 without significant EKG changes.  Patient was given (gastroesophageal reflux disease), Impaired renal function, MRSA (methicillin resistant staph aureus) culture positive, Osteoarthritis of knee, Parkinson disease ([address] Proteinuria, Skull fracture ([address] Temporary loss of eyesight, and Tubular adenoma of colon. Social History:   reports that he has quit smoking. He has never used smokeless tobacco. He reports that he does not drink alcohol and does not use drugs. Family History:   Family History   Problem Relation Age of Onset    No Known Problems Mother     No Known Problems Father        Vitals:  /70   Pulse 51   Temp 98.2 °F (36.8 °C) (Oral)   Resp 16   Ht 5' 7\" (1.702 m)   Wt 168 lb 14 oz (76.6 kg)   SpO2 97%   BMI 26.45 kg/m²   Temp (24hrs), Av °F (36.7 °C), Min:97.3 °F (36.3 °C), Max:98.2 °F (36.8 °C)    No results for input(s): POCGLU in the last 72 hours. I/O (24Hr): Intake/Output Summary (Last 24 hours) at 6/15/2022 1403  Last data filed at 6/15/2022 4482  Gross per 24 hour   Intake 800 ml   Output 2800 ml   Net -2000 ml       Labs:  Hematology:  Recent Labs     06/15/22  1150   WBC 7.6   RBC 4.45   HGB 14.4   HCT 42.2   MCV 94.8   MCH 32.4   MCHC 34.1   RDW 14.3      MPV 9.6     Chemistry:  Recent Labs     06/15/22  0858   *   K 4.5   CL 98   CO2 23   GLUCOSE 228*   BUN 18   CREATININE 1.27*   MG 2.0   ANIONGAP 10   LABGLOM 54*   GFRAA >60   CALCIUM 8.7   CAION 1.13     No results for input(s): PROT, LABALBU, LABA1C, U9MAESH, V4BGNSE, FT4, TSH, AST, ALT, LDH, GGT, ALKPHOS, LABGGT, BILITOT, BILIDIR, AMMONIA, AMYLASE, LIPASE, LACTATE, CHOL, HDL, LDLCHOLESTEROL, CHOLHDLRATIO, TRIG, VLDL, TQJ78PC, PHENYTOIN, PHENYF, URICACID, POCGLU in the last 72 hours.   ABG:No results found for: POCPH, PHART, PH, POCPCO2, KRK3AIS, PCO2, POCPO2, PO2ART, PO2, POCHCO3, VEC6FYK, HCO3, NBEA, PBEA, BEART, BE, THGBART, THB, YCG7URB, ICMQ3TYT, M2OLBCBZ, O2SAT, FIO2  Lab Results   Component Value Date/Time    SPECIAL RFA 16ML 2022 10:46 AM     Lab Results   Component Value Date/Time    CULTURE NO GROWTH 5 DAYS 06/07/2022 10:46 AM       Radiology:  No results found.     Physical Examination:        General appearance:  alert, cooperative and no distress  Mental Status:  oriented to person, place and time and normal affect  Lungs:  clear to auscultation bilaterally, normal effort  Heart:  regular rate and rhythm, no murmur  Abdomen:  soft, nontender, nondistended, normal bowel sounds, no masses, hepatomegaly, splenomegaly  Extremities:  no edema, redness, tenderness in the calves  Skin:  no gross lesions, rashes, induration    Assessment:        Hospital Problems           Last Modified POA    Chronic pain of multiple joints (Chronic) 6/9/2022 Yes    Gout 6/9/2022 Yes    Nerve pain 6/10/2022 Yes    CKD (chronic kidney disease) stage 3, GFR 30-59 ml/min ([facility]) 6/9/2022 Yes    Depression 6/9/2022 Yes    Hyperlipidemia 6/9/2022 Yes    Essential hypertension 6/9/2022 Yes    Parkinson disease ([address] 6/9/2022 Yes    Chronic pain of left knee 6/9/2022 Yes    Overweight (BMI 25.0-29.9) 6/9/2022 Yes          Plan:        Continue lidocaine patch  Continue gabapentin  Continue parkinsonism  Continue lipitor and plavix for CAD, no new blockages, previous stent patent  Continue requip    Await placement    Ellen Arellano MD  6/15/2022  2:03 PM

## 2022-06-16 ENCOUNTER — HOSPITAL ENCOUNTER (OUTPATIENT)
Age: 82
Setting detail: OBSERVATION
Discharge: NURSING, REHAB OR LONG-TERM CARE | DRG: 287 | End: 2022-06-20
Attending: EMERGENCY MEDICINE | Admitting: INTERNAL MEDICINE
Payer: MEDICARE

## 2022-06-16 VITALS
HEART RATE: 55 BPM | SYSTOLIC BLOOD PRESSURE: 108 MMHG | BODY MASS INDEX: 26.51 KG/M2 | HEIGHT: 67 IN | RESPIRATION RATE: 14 BRPM | OXYGEN SATURATION: 95 % | TEMPERATURE: 97.8 F | DIASTOLIC BLOOD PRESSURE: 69 MMHG | WEIGHT: 168.87 LBS

## 2022-06-16 DIAGNOSIS — R07.9 CHEST PAIN, UNSPECIFIED TYPE: Primary | ICD-10-CM

## 2022-06-16 PROCEDURE — 2580000003 HC RX 258: Performed by: STUDENT IN AN ORGANIZED HEALTH CARE EDUCATION/TRAINING PROGRAM

## 2022-06-16 PROCEDURE — 97116 GAIT TRAINING THERAPY: CPT

## 2022-06-16 PROCEDURE — 99285 EMERGENCY DEPT VISIT HI MDM: CPT

## 2022-06-16 PROCEDURE — 6370000000 HC RX 637 (ALT 250 FOR IP): Performed by: STUDENT IN AN ORGANIZED HEALTH CARE EDUCATION/TRAINING PROGRAM

## 2022-06-16 PROCEDURE — 97110 THERAPEUTIC EXERCISES: CPT

## 2022-06-16 PROCEDURE — 99239 HOSP IP/OBS DSCHRG MGMT >30: CPT | Performed by: STUDENT IN AN ORGANIZED HEALTH CARE EDUCATION/TRAINING PROGRAM

## 2022-06-16 PROCEDURE — 6370000000 HC RX 637 (ALT 250 FOR IP): Performed by: INTERNAL MEDICINE

## 2022-06-16 RX ADMIN — ISOSORBIDE MONONITRATE 30 MG: 30 TABLET ORAL at 08:47

## 2022-06-16 RX ADMIN — CARBIDOPA AND LEVODOPA 1 TABLET: 25; 100 TABLET ORAL at 14:23

## 2022-06-16 RX ADMIN — GABAPENTIN 100 MG: 100 CAPSULE ORAL at 08:47

## 2022-06-16 RX ADMIN — ENTACAPONE 200 MG: 200 TABLET, FILM COATED ORAL at 18:54

## 2022-06-16 RX ADMIN — CARBIDOPA AND LEVODOPA 1 TABLET: 25; 100 TABLET ORAL at 08:47

## 2022-06-16 RX ADMIN — ENTACAPONE 200 MG: 200 TABLET, FILM COATED ORAL at 08:47

## 2022-06-16 RX ADMIN — ROPINIROLE HYDROCHLORIDE 0.25 MG: 0.25 TABLET, FILM COATED ORAL at 08:47

## 2022-06-16 RX ADMIN — PANTOPRAZOLE SODIUM 40 MG: 40 TABLET, DELAYED RELEASE ORAL at 08:47

## 2022-06-16 RX ADMIN — SODIUM CHLORIDE, PRESERVATIVE FREE 10 ML: 5 INJECTION INTRAVENOUS at 08:48

## 2022-06-16 RX ADMIN — ALLOPURINOL 100 MG: 100 TABLET ORAL at 08:47

## 2022-06-16 RX ADMIN — METOPROLOL TARTRATE 12.5 MG: 25 TABLET ORAL at 08:47

## 2022-06-16 RX ADMIN — CARBIDOPA AND LEVODOPA 1 TABLET: 25; 100 TABLET ORAL at 18:54

## 2022-06-16 RX ADMIN — GABAPENTIN 100 MG: 100 CAPSULE ORAL at 14:22

## 2022-06-16 RX ADMIN — MAGNESIUM GLUCONATE 500 MG ORAL TABLET 400 MG: 500 TABLET ORAL at 08:47

## 2022-06-16 RX ADMIN — CLOPIDOGREL 75 MG: 75 TABLET, FILM COATED ORAL at 08:47

## 2022-06-16 RX ADMIN — ENTACAPONE 200 MG: 200 TABLET, FILM COATED ORAL at 14:23

## 2022-06-16 RX ADMIN — ROPINIROLE HYDROCHLORIDE 0.25 MG: 0.25 TABLET, FILM COATED ORAL at 14:22

## 2022-06-16 NOTE — PROGRESS NOTES
Patient left via stretcher with [facility] with all belongings. Patient's family member called and report given to [facility].

## 2022-06-16 NOTE — PROGRESS NOTES
Physical Therapy  Facility/Department: [facility] CAR 2  Physical Therapy Daily Treatment Note    Name: Chacorta Bruno  : 1940  MRN: [id number]  Date of Service: 2022    Discharge Recommendations:  Patient would benefit from continued therapy after discharge   PT Equipment Recommendations  Equipment Needed: No      Patient Diagnosis(es): The encounter diagnosis was NSTEMI (non-ST elevated myocardial infarction) ([facility] [address] 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 ([facility] [address] CKD (chronic kidney disease) stage 2, GFR 60-89 ml/min, CKD (chronic kidney disease) stage 3, GFR 30-59 ml/min ([facility]), Diverticulosis of colon, GERD (gastroesophageal reflux disease), Impaired renal function, MRSA (methicillin resistant staph aureus) culture positive, Osteoarthritis of knee, Parkinson disease ([facility] [address] Proteinuria, Skull fracture ([facility] [address] Temporary loss of eyesight, and Tubular adenoma of colon. Past Surgical History:  has a past surgical history that includes Colonoscopy (2012); shoulder surgery (Right); pr colsc flx w/rmvl of tumor polyp lesion snare tq (N/A, 2017); and Colonoscopy (2017). Assessment   Body Structures, Functions, Activity Limitations Requiring Skilled Therapeutic Intervention: Decreased functional mobility ; Decreased strength;Decreased endurance;Decreased balance; Increased pain  Assessment: Pt with mobility deficits requiring CGA to ambulate 50 feet with a RW. Pt mildly unsteady with ambulation this date. Pt would benefit from additional PT upon discharge to maximize functional independence. Pt will require 24 hour assistance with mobility upon discharge.   Therapy Prognosis: Good  Decision Making: Medium Complexity  Requires PT Follow-Up: Yes  Activity Tolerance  Activity Tolerance: Patient limited by fatigue;Patient limited by endurance     Plan   Plan  Plan:  (5-6x/week)  Current Treatment Recommendations: Mely Barbosa training,Functional mobility training,Transfer training,Gait training,Stair training,Patient/Caregiver education & training,Safety education & training,Home exercise program,Equipment evaluation, education, & procurement,Therapeutic activities,Endurance training  Safety Devices  Type of Devices: Gait belt,Call light within reach,Chair alarm in place,Nurse notified,Left in chair,Patient at risk for falls  Restraints  Restraints Initially in Place: No     Restrictions  Restrictions/Precautions  Restrictions/Precautions: Up as Tolerated,Fall Risk  Required Braces or Orthoses?: No     Subjective   General  Patient assessed for rehabilitation services?: Yes  Response To Previous Treatment: Patient with no complaints from previous session. Family / Caregiver Present: No  Follows Commands: Within Functional Limits  Subjective  Subjective: Pt supine upon arrival.  RN and pt agreeable to Pt. Pt pleasant and cooperative. Cognition   Cognition  Overall Cognitive Status: WFL     Objective                              Bed mobility  Supine to Sit: Contact guard assistance  Sit to Supine:  (pt retired up to a chair at the conclusion of today's session.)  Scooting: Contact guard assistance  Bed Mobility Comments: HOB elevated ~30 degrees with use of handrails. Increased time/effort to perform. Transfers  Sit to Stand: Contact guard assistance  Stand to sit: Contact guard assistance  Ambulation  Surface: level tile  Device: Rolling Walker  Assistance: Contact guard assistance  Quality of Gait: mildly unsteady, increased SHABBIR, B knee flexed. Gait Deviations: Slow Ewa; Increased SHABBIR; Decreased step length  Distance: 5 feet, seated rest break, 50 feet. More Ambulation?: No  Stairs/Curb  Stairs?: No     Balance  Posture: Fair  Sitting - Static: Good  Sitting - Dynamic: Good;-  Standing - Static: Fair  Standing - Dynamic: Fair  Comments: assessed with RW  A/AROM Exercises:  Ankle pumps x20 BLE, LAQs x10 BLE, SLR x10 BLE.                                                  AM-PAC Score  AM-PAC Inpatient Mobility Raw Score : 18 (06/16/22 1322)  AM-PAC Inpatient T-Scale Score : 43.63 (06/16/22 1322)  Mobility Inpatient CMS 0-100% Score: 46.58 (06/16/22 1322)  Mobility Inpatient CMS G-Code Modifier : CK (06/16/22 1322)          Goals  Short Term Goals  Time Frame for Short term goals: 14 visits  Short term goal 1: Pt will ambulate 300 feet with least restrictive device and supervision to increase functional independence. Short term goal 2: Pt will demonstrate good- standing balance to decrease fall risk. Short term goal 3: Pt will perform sit<>stand transfer with supervision to increase functional independence. Short term goal 4: Pt will tolerate a 35 minute therapy session to promote increased endurance. Short term goal 5: Pt will negotiate 5 stairs with bilateral handrails and SBA to allow the pt to enter prior living arrangements. Education  Patient Education  Education Given To: Patient  Education Provided: Transfer Training;Equipment;Plan of Care; Fall Prevention Strategies  Education Method: Verbal  Barriers to Learning: None  Education Outcome: Verbalized understanding      Therapy Time   Individual Concurrent Group Co-treatment   Time In 0916         Time Out 0945         Minutes 29         Timed Code Treatment Minutes: 1 [facility], PT

## 2022-06-16 NOTE — CARE COORDINATION
Transitional planning    Writer placed call to George Frank at [facility] to follow up on precert, should be done today. Transport request faxed to [address] for [phone].

## 2022-06-16 NOTE — PLAN OF CARE
Problem: Discharge Planning  Goal: Discharge to home or other facility with appropriate resources  Outcome: Completed     Problem: Pain  Goal: Verbalizes/displays adequate comfort level or baseline comfort level  Outcome: Completed     Problem: Chronic Conditions and Co-morbidities  Goal: Patient's chronic conditions and co-morbidity symptoms are monitored and maintained or improved  Outcome: Completed     Problem: Safety - Adult  Goal: Free from fall injury  Outcome: Completed     Problem: ABCDS Injury Assessment  Goal: Absence of physical injury  Outcome: Completed

## 2022-06-16 NOTE — PROGRESS NOTES
[facility]  Office: [address], DO, Jaspreet Toneyroe, DO, Eugenio Dry, DO, Rey Vera Blood, DO, Yenny Wagner MD, Derrell Arceo MD, Linda Mena MD, Yasmin Arenas MD, Tracey Preston MD, Manuel Nuno MD, Abner Nava MD, Miguel A Persaud, DO, Desean Fung MD,  Lili Dumont, DO, Carmelina García MD, Kiya Crowe MD, Jem Chavarria MD, Andrew Trevino DO, Diana Asher MD, Lubna Cash MD, Eduardo Moran, DO, Rodriguez Mccormick MD, Carlee Sprague [facility], [facility], CNP, Elicia Tamayo, CNP, Marito Bowers, CNP, Justyna Talley, CNP, Remy Nixon, CNP, AD DejesusC, Bora Shearer, [facility], Nishant Miranda, CNP, Aletha Estrella, CNP, Yocasta Muhammad, CNP, Bladimir Pelletier, CNS, Quan Melchor, [facility], Latia Arciniega, CNP, Slade Badillo, CNP, Margo Oh, [facility]    Progress Note    6/16/2022    3:00 PM    Name:   Chacorta Bruno  MRN:     [id number]     Acct:      [de-identified]   Room:   2022/2022-01   Day:  9  Admit Date:  6/7/2022  9:22 AM    PCP:   JW Chowdhury CNP  Code Status:  Full Code    Subjective:     C/C:   Chief Complaint   Patient presents with    Chest Pain     Interval History Status: not changed. Patient seen at bedside  No acute complaints  Chest pain resolved  Improves with lidocaine patch  bp stable  Hr stable    Brief History: This is an 25-year-old male with a past medical history listed below who presents to the emergency department with a chief complaint of sharp chest pain 7-8/10 radiating to his back with shortness of breath and dizziness.  Also endorsing some left knee pain after recent steroid injection with his pain management provider at [facility] pain management clinic on 6/6/2022. Initial troponin was slightly elevated from his baseline at 41 uptrending to 59 without significant EKG changes.  Patient was given nitro and started on a heparin infusion in the emergency department and cardiology was consulted, cardiac cath on 6/8/2022 showed single vessel CAD with patent stent in LAD. Review of Systems:     Constitutional:  negative for chills, fevers, sweats  Respiratory:  negative for cough, dyspnea on exertion, shortness of breath, wheezing  Cardiovascular: positive for reproducible chest pain, no chest pressure/discomfort, lower extremity edema, palpitations  Gastrointestinal:  negative for abdominal pain, constipation, diarrhea, nausea, vomiting  Neurological:  negative for dizziness, headache    Medications: Allergies:     Allergies   Allergen Reactions    Dye [Iodides]      pain    Aspirin      Brain bleed         Current Meds:   Scheduled Meds:    gabapentin  100 mg Oral TID    amitriptyline  25 mg Oral Nightly    lidocaine  1 patch TransDERmal Daily    sodium chloride flush  5-40 mL IntraVENous 2 times per day    isosorbide mononitrate  30 mg Oral Daily    allopurinol  100 mg Oral Daily    atorvastatin  40 mg Oral Nightly    carbidopa-levodopa  1 tablet Oral 4x Daily    clopidogrel  75 mg Oral Daily    entacapone  200 mg Oral 4x Daily    pantoprazole  40 mg Oral QAM AC    magnesium oxide  400 mg Oral Daily    rOPINIRole  0.25 mg Oral TID    metoprolol tartrate  12.5 mg Oral BID     Continuous Infusions:    sodium chloride       PRN Meds: calcium carbonate, sodium chloride flush, sodium chloride, sodium chloride flush, potassium chloride **OR** potassium alternative oral replacement **OR** potassium chloride, magnesium sulfate, ondansetron **OR** ondansetron, acetaminophen **OR** acetaminophen, magnesium hydroxide, nitroGLYCERIN    Data:     Past Medical History:   has a past medical history of 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 Social History:   reports that he has quit smoking. He has never used smokeless tobacco. He reports that he does not drink alcohol and does not use drugs. Family History:   Family History   Problem Relation Age of Onset    No Known Problems Mother     No Known Problems Father        Vitals:  /69   Pulse 56   Temp 98.3 °F (36.8 °C) (Oral)   Resp 20   Ht 5' 7\" (1.702 m)   Wt 168 lb 14 oz (76.6 kg)   SpO2 97%   BMI 26.45 kg/m²   Temp (24hrs), Av.8 °F (36.6 °C), Min:97.6 °F (36.4 °C), Max:98.3 °F (36.8 °C)    No results for input(s): POCGLU in the last 72 hours. I/O (24Hr): Intake/Output Summary (Last 24 hours) at 2022 1500  Last data filed at 2022 0817  Gross per 24 hour   Intake 1000 ml   Output 1775 ml   Net -775 ml       Labs:  Hematology:  Recent Labs     06/15/22  1150   WBC 7.6   RBC 4.45   HGB 14.4   HCT 42.2   MCV 94.8   MCH 32.4   MCHC 34.1   RDW 14.3      MPV 9.6     Chemistry:  Recent Labs     06/15/22  0858   *   K 4.5   CL 98   CO2 23   GLUCOSE 228*   BUN 18   CREATININE 1.27*   MG 2.0   ANIONGAP 10   LABGLOM 54*   GFRAA >60   CALCIUM 8.7   CAION 1.13     No results for input(s): PROT, LABALBU, LABA1C, I7GRPYB, U6JOTJL, FT4, TSH, AST, ALT, LDH, GGT, ALKPHOS, LABGGT, BILITOT, BILIDIR, AMMONIA, AMYLASE, LIPASE, LACTATE, CHOL, HDL, LDLCHOLESTEROL, CHOLHDLRATIO, TRIG, VLDL, ABU37EW, PHENYTOIN, PHENYF, URICACID, POCGLU in the last 72 hours.   ABG:No results found for: POCPH, PHART, PH, POCPCO2, KWO6OAP, PCO2, POCPO2, PO2ART, PO2, POCHCO3, BDY2SMX, HCO3, NBEA, PBEA, BEART, BE, THGBART, THB, YJY1BEJ, XWSK8RMR, P7XLJHMU, O2SAT, FIO2  Lab Results   Component Value Date/Time    SPECIAL RFA 16ML 2022 10:46 AM     Lab Results Component Value Date/Time    CULTURE NO GROWTH 5 DAYS 06/07/2022 10:46 AM       Radiology:  No results found.     Physical Examination:        General appearance:  alert, cooperative and no distress  Mental Status:  oriented to person, place and time and normal affect  Lungs:  clear to auscultation bilaterally, normal effort  Heart:  regular rate and rhythm, no murmur  Abdomen:  soft, nontender, nondistended, normal bowel sounds, no masses, hepatomegaly, splenomegaly  Extremities:  no edema, redness, tenderness in the calves  Skin:  no gross lesions, rashes, induration    Assessment:        Hospital Problems           Last Modified POA    Chronic pain of multiple joints (Chronic) 6/9/2022 Yes    Gout 6/9/2022 Yes    Nerve pain 6/10/2022 Yes    CKD (chronic kidney disease) stage 3, GFR 30-59 ml/min ([facility]) 6/9/2022 Yes    Depression 6/9/2022 Yes    Hyperlipidemia 6/9/2022 Yes    Essential hypertension 6/9/2022 Yes    Parkinson disease ([address] 6/9/2022 Yes    Chronic pain of left knee 6/9/2022 Yes    Overweight (BMI 25.0-29.9) 6/9/2022 Yes          Plan:        Continue lidocaine patch  Continue gabapentin  Continue parkinsonism  Continue lipitor and plavix for CAD, no new blockages, previous stent patent  Continue requip    Await placement    Nelson Freire MD  6/16/2022  3:00 PM

## 2022-06-17 ENCOUNTER — CARE COORDINATION (OUTPATIENT)
Dept: CARE COORDINATION | Age: 82
End: 2022-06-17

## 2022-06-17 PROBLEM — I21.4 NSTEMI (NON-ST ELEVATED MYOCARDIAL INFARCTION) (HCC): Status: ACTIVE | Noted: 2022-06-17

## 2022-06-17 PROCEDURE — G0378 HOSPITAL OBSERVATION PER HR: HCPCS

## 2022-06-17 PROCEDURE — 6360000002 HC RX W HCPCS: Performed by: NURSE PRACTITIONER

## 2022-06-17 PROCEDURE — 1200000000 HC SEMI PRIVATE

## 2022-06-17 PROCEDURE — 6370000000 HC RX 637 (ALT 250 FOR IP): Performed by: NURSE PRACTITIONER

## 2022-06-17 PROCEDURE — 99220 PR INITIAL OBSERVATION CARE/DAY 70 MINUTES: CPT | Performed by: INTERNAL MEDICINE

## 2022-06-17 PROCEDURE — 96372 THER/PROPH/DIAG INJ SC/IM: CPT

## 2022-06-17 PROCEDURE — 83735 ASSAY OF MAGNESIUM: CPT

## 2022-06-17 PROCEDURE — 80048 BASIC METABOLIC PNL TOTAL CA: CPT

## 2022-06-17 PROCEDURE — 36415 COLL VENOUS BLD VENIPUNCTURE: CPT

## 2022-06-17 RX ORDER — ACETAMINOPHEN 325 MG/1
650 TABLET ORAL EVERY 6 HOURS PRN
Status: DISCONTINUED | OUTPATIENT
Start: 2022-06-17 | End: 2022-06-20 | Stop reason: HOSPADM

## 2022-06-17 RX ORDER — AMITRIPTYLINE HYDROCHLORIDE 10 MG/1
10 TABLET, FILM COATED ORAL NIGHTLY
Status: DISCONTINUED | OUTPATIENT
Start: 2022-06-17 | End: 2022-06-20 | Stop reason: HOSPADM

## 2022-06-17 RX ORDER — SODIUM CHLORIDE 0.9 % (FLUSH) 0.9 %
5-40 SYRINGE (ML) INJECTION EVERY 12 HOURS SCHEDULED
Status: DISCONTINUED | OUTPATIENT
Start: 2022-06-17 | End: 2022-06-20 | Stop reason: HOSPADM

## 2022-06-17 RX ORDER — SODIUM CHLORIDE 9 MG/ML
INJECTION, SOLUTION INTRAVENOUS PRN
Status: DISCONTINUED | OUTPATIENT
Start: 2022-06-17 | End: 2022-06-20 | Stop reason: HOSPADM

## 2022-06-17 RX ORDER — PANTOPRAZOLE SODIUM 40 MG/1
40 TABLET, DELAYED RELEASE ORAL
Status: DISCONTINUED | OUTPATIENT
Start: 2022-06-17 | End: 2022-06-20 | Stop reason: HOSPADM

## 2022-06-17 RX ORDER — POTASSIUM CHLORIDE 7.45 MG/ML
10 INJECTION INTRAVENOUS PRN
Status: DISCONTINUED | OUTPATIENT
Start: 2022-06-17 | End: 2022-06-20 | Stop reason: HOSPADM

## 2022-06-17 RX ORDER — POTASSIUM CHLORIDE 20 MEQ/1
40 TABLET, EXTENDED RELEASE ORAL PRN
Status: DISCONTINUED | OUTPATIENT
Start: 2022-06-17 | End: 2022-06-20 | Stop reason: HOSPADM

## 2022-06-17 RX ORDER — ENOXAPARIN SODIUM 100 MG/ML
40 INJECTION SUBCUTANEOUS DAILY
Status: DISCONTINUED | OUTPATIENT
Start: 2022-06-17 | End: 2022-06-20 | Stop reason: HOSPADM

## 2022-06-17 RX ORDER — ATORVASTATIN CALCIUM 40 MG/1
40 TABLET, FILM COATED ORAL NIGHTLY
Status: DISCONTINUED | OUTPATIENT
Start: 2022-06-17 | End: 2022-06-20 | Stop reason: HOSPADM

## 2022-06-17 RX ORDER — ISOSORBIDE MONONITRATE 30 MG/1
30 TABLET, EXTENDED RELEASE ORAL DAILY
Status: DISCONTINUED | OUTPATIENT
Start: 2022-06-17 | End: 2022-06-20 | Stop reason: HOSPADM

## 2022-06-17 RX ORDER — LANOLIN ALCOHOL/MO/W.PET/CERES
400 CREAM (GRAM) TOPICAL DAILY
Status: DISCONTINUED | OUTPATIENT
Start: 2022-06-17 | End: 2022-06-20 | Stop reason: HOSPADM

## 2022-06-17 RX ORDER — ROPINIROLE 0.25 MG/1
0.25 TABLET, FILM COATED ORAL 3 TIMES DAILY
Status: DISCONTINUED | OUTPATIENT
Start: 2022-06-17 | End: 2022-06-20 | Stop reason: HOSPADM

## 2022-06-17 RX ORDER — ENTACAPONE 200 MG/1
200 TABLET ORAL 4 TIMES DAILY
Status: DISCONTINUED | OUTPATIENT
Start: 2022-06-17 | End: 2022-06-20 | Stop reason: HOSPADM

## 2022-06-17 RX ORDER — NITROGLYCERIN 0.4 MG/1
0.4 TABLET SUBLINGUAL EVERY 5 MIN PRN
Status: DISCONTINUED | OUTPATIENT
Start: 2022-06-17 | End: 2022-06-20 | Stop reason: HOSPADM

## 2022-06-17 RX ORDER — ONDANSETRON 4 MG/1
4 TABLET, ORALLY DISINTEGRATING ORAL EVERY 8 HOURS PRN
Status: DISCONTINUED | OUTPATIENT
Start: 2022-06-17 | End: 2022-06-20 | Stop reason: HOSPADM

## 2022-06-17 RX ORDER — GABAPENTIN 100 MG/1
100 CAPSULE ORAL 3 TIMES DAILY
Status: DISCONTINUED | OUTPATIENT
Start: 2022-06-17 | End: 2022-06-20 | Stop reason: HOSPADM

## 2022-06-17 RX ORDER — MAGNESIUM SULFATE 1 G/100ML
1000 INJECTION INTRAVENOUS PRN
Status: DISCONTINUED | OUTPATIENT
Start: 2022-06-17 | End: 2022-06-20 | Stop reason: HOSPADM

## 2022-06-17 RX ORDER — SODIUM CHLORIDE 0.9 % (FLUSH) 0.9 %
10 SYRINGE (ML) INJECTION PRN
Status: DISCONTINUED | OUTPATIENT
Start: 2022-06-17 | End: 2022-06-20 | Stop reason: HOSPADM

## 2022-06-17 RX ORDER — ATORVASTATIN CALCIUM 40 MG/1
40 TABLET, FILM COATED ORAL DAILY
Status: DISCONTINUED | OUTPATIENT
Start: 2022-06-17 | End: 2022-06-17 | Stop reason: SDUPTHER

## 2022-06-17 RX ORDER — ONDANSETRON 2 MG/ML
4 INJECTION INTRAMUSCULAR; INTRAVENOUS EVERY 6 HOURS PRN
Status: DISCONTINUED | OUTPATIENT
Start: 2022-06-17 | End: 2022-06-20 | Stop reason: HOSPADM

## 2022-06-17 RX ORDER — CLOPIDOGREL BISULFATE 75 MG/1
75 TABLET ORAL DAILY
Status: DISCONTINUED | OUTPATIENT
Start: 2022-06-17 | End: 2022-06-20 | Stop reason: HOSPADM

## 2022-06-17 RX ORDER — ALLOPURINOL 100 MG/1
100 TABLET ORAL DAILY
Status: DISCONTINUED | OUTPATIENT
Start: 2022-06-17 | End: 2022-06-20 | Stop reason: HOSPADM

## 2022-06-17 RX ORDER — ACETAMINOPHEN 650 MG/1
650 SUPPOSITORY RECTAL EVERY 6 HOURS PRN
Status: DISCONTINUED | OUTPATIENT
Start: 2022-06-17 | End: 2022-06-20 | Stop reason: HOSPADM

## 2022-06-17 RX ADMIN — ROPINIROLE HYDROCHLORIDE 0.25 MG: 0.25 TABLET, FILM COATED ORAL at 13:11

## 2022-06-17 RX ADMIN — ENTACAPONE 200 MG: 200 TABLET, FILM COATED ORAL at 20:42

## 2022-06-17 RX ADMIN — CARBIDOPA AND LEVODOPA 1 TABLET: 25; 100 TABLET ORAL at 19:28

## 2022-06-17 RX ADMIN — CARBIDOPA AND LEVODOPA 1 TABLET: 25; 100 TABLET ORAL at 13:11

## 2022-06-17 RX ADMIN — ENTACAPONE 200 MG: 200 TABLET, FILM COATED ORAL at 13:11

## 2022-06-17 RX ADMIN — AMITRIPTYLINE HYDROCHLORIDE 10 MG: 10 TABLET, FILM COATED ORAL at 20:43

## 2022-06-17 RX ADMIN — ROPINIROLE HYDROCHLORIDE 0.25 MG: 0.25 TABLET, FILM COATED ORAL at 08:52

## 2022-06-17 RX ADMIN — ENTACAPONE 200 MG: 200 TABLET, FILM COATED ORAL at 08:52

## 2022-06-17 RX ADMIN — ACETAMINOPHEN 650 MG: 325 TABLET ORAL at 13:11

## 2022-06-17 RX ADMIN — ROPINIROLE HYDROCHLORIDE 0.25 MG: 0.25 TABLET, FILM COATED ORAL at 20:42

## 2022-06-17 RX ADMIN — CARBIDOPA AND LEVODOPA 1 TABLET: 25; 100 TABLET ORAL at 08:52

## 2022-06-17 RX ADMIN — GABAPENTIN 100 MG: 100 CAPSULE ORAL at 13:11

## 2022-06-17 RX ADMIN — METOPROLOL TARTRATE 12.5 MG: 25 TABLET ORAL at 08:52

## 2022-06-17 RX ADMIN — ENOXAPARIN SODIUM 40 MG: 100 INJECTION SUBCUTANEOUS at 08:53

## 2022-06-17 RX ADMIN — DESMOPRESSIN ACETATE 40 MG: 0.2 TABLET ORAL at 20:42

## 2022-06-17 RX ADMIN — GABAPENTIN 100 MG: 100 CAPSULE ORAL at 20:42

## 2022-06-17 RX ADMIN — CARBIDOPA AND LEVODOPA 1 TABLET: 25; 100 TABLET ORAL at 20:42

## 2022-06-17 RX ADMIN — METOPROLOL TARTRATE 12.5 MG: 25 TABLET ORAL at 20:43

## 2022-06-17 RX ADMIN — ALLOPURINOL 100 MG: 100 TABLET ORAL at 08:52

## 2022-06-17 RX ADMIN — PANTOPRAZOLE SODIUM 40 MG: 40 TABLET, DELAYED RELEASE ORAL at 08:52

## 2022-06-17 RX ADMIN — GABAPENTIN 100 MG: 100 CAPSULE ORAL at 08:52

## 2022-06-17 RX ADMIN — CLOPIDOGREL 75 MG: 75 TABLET, FILM COATED ORAL at 08:52

## 2022-06-17 RX ADMIN — ENTACAPONE 200 MG: 200 TABLET, FILM COATED ORAL at 19:28

## 2022-06-17 RX ADMIN — MAGNESIUM GLUCONATE 500 MG ORAL TABLET 400 MG: 500 TABLET ORAL at 08:52

## 2022-06-17 RX ADMIN — ISOSORBIDE MONONITRATE 30 MG: 30 TABLET ORAL at 08:52

## 2022-06-17 SDOH — SOCIAL STABILITY: SOCIAL INSECURITY
WITHIN THE LAST YEAR, HAVE YOU BEEN KICKED, HIT, SLAPPED, OR OTHERWISE PHYSICALLY HURT BY YOUR PARTNER OR EX-PARTNER?: NO

## 2022-06-17 SDOH — SOCIAL STABILITY: SOCIAL NETWORK: HOW OFTEN DO YOU ATTENT MEETINGS OF THE CLUB OR ORGANIZATION YOU BELONG TO?: NEVER

## 2022-06-17 SDOH — HEALTH STABILITY: MENTAL HEALTH: HOW OFTEN DO YOU HAVE A DRINK CONTAINING ALCOHOL?: NEVER

## 2022-06-17 SDOH — SOCIAL STABILITY: SOCIAL INSECURITY: WITHIN THE LAST YEAR, HAVE YOU BEEN HUMILIATED OR EMOTIONALLY ABUSED IN OTHER WAYS BY YOUR PARTNER OR EX-PARTNER?: NO

## 2022-06-17 SDOH — SOCIAL STABILITY: SOCIAL INSECURITY: WITHIN THE LAST YEAR, HAVE YOU BEEN AFRAID OF YOUR PARTNER OR EX-PARTNER?: NO

## 2022-06-17 SDOH — HEALTH STABILITY: PHYSICAL HEALTH: ON AVERAGE, HOW MANY MINUTES DO YOU ENGAGE IN EXERCISE AT THIS LEVEL?: 0 MIN

## 2022-06-17 SDOH — SOCIAL STABILITY: SOCIAL NETWORK: ARE YOU MARRIED, WIDOWED, DIVORCED, SEPARATED, NEVER MARRIED, OR LIVING WITH A PARTNER?: DIVORCED

## 2022-06-17 SDOH — HEALTH STABILITY: MENTAL HEALTH
STRESS IS WHEN SOMEONE FEELS TENSE, NERVOUS, ANXIOUS, OR CAN'T SLEEP AT NIGHT BECAUSE THEIR MIND IS TROUBLED. HOW STRESSED ARE YOU?: TO SOME EXTENT

## 2022-06-17 SDOH — ECONOMIC STABILITY: INCOME INSECURITY: HOW HARD IS IT FOR YOU TO PAY FOR THE VERY BASICS LIKE FOOD, HOUSING, MEDICAL CARE, AND HEATING?: NOT HARD AT ALL

## 2022-06-17 SDOH — SOCIAL STABILITY: SOCIAL NETWORK
DO YOU BELONG TO ANY CLUBS OR ORGANIZATIONS SUCH AS CHURCH GROUPS UNIONS, FRATERNAL OR ATHLETIC GROUPS, OR SCHOOL GROUPS?: NO

## 2022-06-17 SDOH — ECONOMIC STABILITY: TRANSPORTATION INSECURITY
IN THE PAST 12 MONTHS, HAS LACK OF TRANSPORTATION KEPT YOU FROM MEETINGS, WORK, OR FROM GETTING THINGS NEEDED FOR DAILY LIVING?: NO

## 2022-06-17 SDOH — ECONOMIC STABILITY: FOOD INSECURITY: WITHIN THE PAST 12 MONTHS, YOU WORRIED THAT YOUR FOOD WOULD RUN OUT BEFORE YOU GOT MONEY TO BUY MORE.: NEVER TRUE

## 2022-06-17 SDOH — ECONOMIC STABILITY: FOOD INSECURITY: WITHIN THE PAST 12 MONTHS, THE FOOD YOU BOUGHT JUST DIDN'T LAST AND YOU DIDN'T HAVE MONEY TO GET MORE.: NEVER TRUE

## 2022-06-17 SDOH — SOCIAL STABILITY: SOCIAL INSECURITY
WITHIN THE LAST YEAR, HAVE TO BEEN RAPED OR FORCED TO HAVE ANY KIND OF SEXUAL ACTIVITY BY YOUR PARTNER OR EX-PARTNER?: NO

## 2022-06-17 SDOH — HEALTH STABILITY: PHYSICAL HEALTH: ON AVERAGE, HOW MANY DAYS PER WEEK DO YOU ENGAGE IN MODERATE TO STRENUOUS EXERCISE (LIKE A BRISK WALK)?: 0 DAYS

## 2022-06-17 SDOH — SOCIAL STABILITY: SOCIAL NETWORK: IN A TYPICAL WEEK, HOW MANY TIMES DO YOU TALK ON THE PHONE WITH FAMILY, FRIENDS, OR NEIGHBORS?: THREE TIMES A WEEK

## 2022-06-17 SDOH — ECONOMIC STABILITY: INCOME INSECURITY: IN THE LAST 12 MONTHS, WAS THERE A TIME WHEN YOU WERE NOT ABLE TO PAY THE MORTGAGE OR RENT ON TIME?: NO

## 2022-06-17 SDOH — ECONOMIC STABILITY: HOUSING INSECURITY: IN THE LAST 12 MONTHS, HOW MANY PLACES HAVE YOU LIVED?: 1

## 2022-06-17 SDOH — SOCIAL STABILITY: SOCIAL NETWORK: HOW OFTEN DO YOU ATTEND CHURCH OR RELIGIOUS SERVICES?: NEVER

## 2022-06-17 ASSESSMENT — LIFESTYLE VARIABLES: HOW OFTEN DO YOU HAVE A DRINK CONTAINING ALCOHOL: NEVER

## 2022-06-17 ASSESSMENT — ENCOUNTER SYMPTOMS
FACIAL SWELLING: 0
TROUBLE SWALLOWING: 0
WHEEZING: 0
DIARRHEA: 0
COUGH: 0
CHEST TIGHTNESS: 0
SHORTNESS OF BREATH: 0
CONSTIPATION: 0
NAUSEA: 0
VOMITING: 0
SINUS PAIN: 0
SINUS PRESSURE: 0
COLOR CHANGE: 0
SORE THROAT: 0
ABDOMINAL PAIN: 0

## 2022-06-17 ASSESSMENT — PAIN SCALES - GENERAL: PAINLEVEL_OUTOF10: 3

## 2022-06-17 NOTE — ED PROVIDER NOTES
[facility]  ED  Emergency Department Encounter  Emergency Medicine Resident     Pt Name: Tito Prader  MRN: [id number]  [place] 1940  Date of evaluation: 6/16/22  PCP:  Corrinne Augusta, APRN - CNP    CHIEF COMPLAINT       Need for placement    HISTORY OFPRESENT ILLNESS  (Location/Symptom, Timing/Onset, Context/Setting, Quality, Duration, Modifying Jelani Sandy.)      Tito Prader is a 79 y. o.yo male who presents with his son after being sent back from a nursing facility. Patient was admitted to the hospital on 6/7 and was discharged earlier today. Patient was admitted for an NSTEMI had a work-up and was discharged to nursing facility for rehab and physical therapy. Family states when they got there the rehab facility did not receive a prior authorization and would not accept him into the building. Transportation brought him back to the emergency department  He was discharged from stairs. Patient denies any other concerns or complaints. 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 has a past surgical history that includes Colonoscopy (11/02/2012); shoulder surgery (Right); pr colsc flx w/rmvl of tumor polyp lesion snare tq (N/A, 9/19/2017); and Colonoscopy (09/19/2017).      Social History     Socioeconomic History    Marital status: Single     Spouse name: Not on file    Number of children: Not on file    Years of education: Not on file    Highest education level: Not on file   Occupational History    Not on file   Tobacco Use    Smoking status: Former Smoker    Smokeless tobacco: Never Used   Vaping Use    Vaping Use: Never used   Substance and Sexual Activity    Alcohol use: No    Drug use: No    Sexual activity: Not Currently   Other Topics Concern    Not on file   Social History Narrative    Not on file     Social Determinants of Health     Financial Resource Strain: Low Risk     Difficulty of Paying Living Expenses: Not hard at all   Food Insecurity: No Food Insecurity    Worried About Running Out of Food in the Last Year: Never true    Destiny of Food in the Last Year: Never true   Transportation Needs: Unmet Transportation Needs    Lack of Transportation (Medical): No    Lack of Transportation (Non-Medical): Yes   Physical Activity: Inactive    Days of Exercise per Week: 0 days    Minutes of Exercise per Session: 0 min   Stress: Stress Concern Present    Feeling of Stress : To some extent   Social Connections: Socially Isolated    Frequency of Communication with Friends and Family: Three times a week    Frequency of Social Gatherings with Friends and Family: Three times a week    Attends Yazidism Services: Never    Active Member of Clubs or Organizations: No    Attends Club or Organization Meetings: Never    Marital Status:    Intimate Partner Violence:     Fear of Current or Ex-Partner: Not on file    Emotionally Abused: Not on file    Physically Abused: Not on file    Sexually Abused: Not on file   Housing Stability: [address] Unable to Pay for Housing in the Last Year: No    Number of [place] in the Last Year: 1    Unstable Housing in the Last Year: No       Family History   Problem Relation Age of Onset    No Known Problems Mother     No Known Problems Father         Allergies:  Dye [iodides] and Aspirin    Home Medications:  Prior to Admission medications    Medication Sig Start Date End Date Taking?  Authorizing Provider gabapentin (NEURONTIN) 100 MG capsule Take 1 capsule by mouth 3 times daily for 30 days. 6/15/22 7/15/22  Chantel Kwok MD   isosorbide mononitrate (IMDUR) 30 MG extended release tablet Take 1 tablet by mouth daily 6/10/22   JW Vazquez NP   nitroGLYCERIN (NITROSTAT) 0.4 MG SL tablet up to max of 3 total doses.  If no relief after 1 dose, call 911. 6/9/22   JW Vazquez NP   metoprolol tartrate (LOPRESSOR) 25 MG tablet Take 0.5 tablets by mouth 2 times daily 6/9/22   JW Vazquez NP   lidocaine 4 % external patch Place 1 patch onto the skin daily for 15 days 6/9/22 6/24/22  JW Vazquez NP   entacapone (COMTAN) 200 MG tablet TAKE ONE (1) TABLET BY MOUTH FOUR (4) TIMES DAILY WITH SINEMET 5/26/22   Patrick You MD   magnesium oxide (MAG-OX) 400 (240 Mg) MG tablet TAKE 1 TABLET BY MOUTH ONCE DAILY 5/23/22   Claudetta Rainbow, APRN - CNP   atorvastatin (LIPITOR) 40 MG tablet take 1 tablet by mouth once daily 5/17/22   Claudetta Rainbow, APRN - CNP   diclofenac sodium (VOLTAREN) 1 % GEL Apply 2 g topically 2 times daily as needed for Pain (joint pain) 5/9/22   JW Walker NP   amitriptyline (ELAVIL) 10 MG tablet Take 1 tablet by mouth nightly 4/5/22   Lissette Michael MD   esomeprazole (NEXIUM) 20 MG delayed release capsule TAKE 1 CAPSULE BY MOUTH ONCE DAILY IN THE MORNING BEFORE BREAKFAST 2/23/22   Claudetta Rainbow, APRN - CNP   rOPINIRole (REQUIP) 0.25 MG tablet TAKE ONE (1) TABLET BY MOUTH THREE (3) TIMES DAILY 2/23/22   Claudetta Rainbow, APRN - CNP   carbidopa-levodopa (SINEMET)  MG per tablet TAKE 1 TABLET BY MOUTH 4 TIMES DAILY 12/3/21 3/18/22  JW Brady CNP   clopidogrel (PLAVIX) 75 MG tablet TAKE 1 TABLET BY MOUTH ONCE DAILY  11/5/21   JW Brady CNP   allopurinol (ZYLOPRIM) 100 MG tablet TAKE 1 TABLET BY MOUTH ONCE DAILY  11/5/21   JW Brady CNP   acetaminophen (TYLENOL 8 HOUR ARTHRITIS PAIN) 650 MG extended release tablet Take 1 tablet by mouth every 8 hours as needed for Pain 6/14/21   Jalen Nipper, APRN - CNP   butalbital-acetaminophen-caffeine (FIORICET, ESGIC) -40 MG per tablet Take 1 tab prn only for severe headache. Can repeat after 4 hrs if needed. Max 2 tabs/24 hrs. Max 4 tabs/week 6/14/21   Jalne Nipper, APRN - CNP       REVIEW OFSYSTEMS    (2-9 systems for level 4, 10 or more for level 5)      Review of Systems   Constitutional: Negative for chills, diaphoresis, fatigue and fever. HENT: Negative for facial swelling, nosebleeds, sinus pressure, sinus pain, sore throat and trouble swallowing. Respiratory: Negative for cough, chest tightness, shortness of breath and wheezing. Cardiovascular: Positive for chest pain. Negative for palpitations and leg swelling. Gastrointestinal: Negative for abdominal pain, constipation, diarrhea, nausea and vomiting. Genitourinary: Negative for dysuria, flank pain and hematuria. Musculoskeletal: Negative for arthralgias, gait problem, neck pain and neck stiffness. Skin: Negative for color change, rash and wound. Neurological: Negative for dizziness, syncope, weakness, light-headedness and headaches. PHYSICAL EXAM   (up to 7 for level 4, 8 or more forlevel 5)      INITIAL VITALS:   ED Triage Vitals [06/16/22 2327]   BP Temp Temp src Heart Rate Resp SpO2 Height Weight   134/83 97.5 °F (36.4 °C) oral 59 20 99 % -- --       Physical Exam  Constitutional:       General: He is not in acute distress. Appearance: He is normal weight. HENT:      Head: Normocephalic and atraumatic. Right Ear: External ear normal.      Left Ear: External ear normal.      Nose: Nose normal.   Eyes:      General: No scleral icterus. Extraocular Movements: Extraocular movements intact. Conjunctiva/sclera: Conjunctivae normal.      Pupils: Pupils are equal, round, and reactive to light. Cardiovascular:      Rate and Rhythm: Normal rate and regular rhythm.       Pulses: Normal pulses. Heart sounds: Normal heart sounds. Pulmonary:      Effort: Pulmonary effort is normal. No respiratory distress. Breath sounds: Normal breath sounds. Abdominal:      General: Abdomen is flat. Bowel sounds are normal. There is no distension. Palpations: Abdomen is soft. Tenderness: There is no abdominal tenderness. Musculoskeletal:         General: Normal range of motion. Cervical back: Normal range of motion. No muscular tenderness. Skin:     General: Skin is warm and dry. Neurological:      General: No focal deficit present. Mental Status: He is alert and oriented to person, place, and time. Cranial Nerves: No cranial nerve deficit. DIFFERENTIAL  DIAGNOSIS     PLAN (LABS / IMAGING / EKG):  Orders Placed This Encounter   Procedures    Comprehensive Metabolic Panel w/ Reflex to MG    Magnesium    CBC    Lipid panel - fasting    ADULT DIET;  Regular; Low Fat/Low Chol/High Fiber/OMKAR; No Caffeine    Vital signs per unit routine    Notify physician    Up as tolerated    Daily weights    Intake and output    Place intermittent pneumatic compression device    Telemetry monitoring - continuous duration    Notify physician     Full Code    Inpatient consult to Hospitalist    Contact isolation    Initiate Oxygen Therapy Protocol    Pulse Oximetry Spot Check    EKG 12 lead    Place in Observation Service       MEDICATIONS ORDERED:  Orders Placed This Encounter   Medications    amitriptyline (ELAVIL) tablet 10 mg    atorvastatin (LIPITOR) tablet 40 mg    allopurinol (ZYLOPRIM) tablet 100 mg    carbidopa-levodopa (SINEMET)  MG per tablet 1 tablet    clopidogrel (PLAVIX) tablet 75 mg    entacapone (COMTAN) tablet 200 mg    pantoprazole (PROTONIX) tablet 40 mg    gabapentin (NEURONTIN) capsule 100 mg    isosorbide mononitrate (IMDUR) extended release tablet 30 mg    magnesium oxide (MAG-OX) tablet 400 mg    metoprolol tartrate (LOPRESSOR) tablet 12.5 mg    rOPINIRole (REQUIP) tablet 0.25 mg    sodium chloride flush 0.9 % injection 5-40 mL    sodium chloride flush 0.9 % injection 10 mL    0.9 % sodium chloride infusion    OR Linked Order Group     ondansetron (ZOFRAN-ODT) disintegrating tablet 4 mg     ondansetron (ZOFRAN) injection 4 mg    OR Linked Order Group     acetaminophen (TYLENOL) tablet 650 mg     acetaminophen (TYLENOL) suppository 650 mg    magnesium hydroxide (MILK OF MAGNESIA) 400 MG/5ML suspension 30 mL    enoxaparin (LOVENOX) injection 40 mg     Order Specific Question:   Indication of Use     Answer:   Prophylaxis-DVT/PE    OR Linked Order Group     potassium chloride (KLOR-CON M) extended release tablet 40 mEq     potassium bicarb-citric acid (EFFER-K) effervescent tablet 40 mEq     potassium chloride 10 mEq/100 mL IVPB (Peripheral Line)    potassium chloride 10 mEq/100 mL IVPB (Peripheral Line)    magnesium sulfate 1000 mg in dextrose 5% 100 mL IVPB    atorvastatin (LIPITOR) tablet 40 mg    nitroGLYCERIN (NITROSTAT) SL tablet 0.4 mg       DDX: Chest pain, placement     Initial MDM/Plan: 79 y.o. male who presents with need for placement. Patient was admitted on 6/7 for an NSTEMI. Patient was supposed to be discharged to [facility]. \Hospital Has a New Name and Outlook.\"" today on arrival to [facility] they had no prior authorization would not accept him so ambulance brought him back here. Patient denies any new concerns or complaints. Will reach out to [facility] as thoughts who had discharged him earlier today. DIAGNOSTIC RESULTS / EMERGENCYDEPARTMENT COURSE / MDM     LABS:  Labs Reviewed - No data to display      RADIOLOGY:  No results found.       EKG      All EKG's are interpreted by the Emergency Department Physicianwho either signs or Co-signs this chart in the absence of a cardiologist.    EMERGENCY DEPARTMENT COURSE:  ED Course as of 06/17/22 0110   Thu Jun 16, 2022   2350 Pt seen [CD]   Fri Jun 17, 2022   0009 Patient was

## 2022-06-17 NOTE — CARE COORDINATION
Late entry 6/16/22  Call placed to [address] and spoke with Angela Esteban, transport time pushed back to 1800 due to no auth received. late entry 6/16/22 1700  Transport cancelled due to not having auth, called [facility] spoke with Dianne, primary RN aware .

## 2022-06-17 NOTE — CARE COORDINATION
Patient has been inpatient @ [facility]. V's since 06/09/22. It appears that he was discharged on 06/16/22 and then admitted again.     Plan of Care   [facility]. will monitor patients chart for changes

## 2022-06-17 NOTE — CARE COORDINATION
Voicemail left for admissions at [facility] requesting return call    1430 notified by Checo Villegas at [facility] that insurance has denied SNF. PS Dr Greene May to notify. He would like to pursue expedited appeal. Request faxed to [phone] reference #[id number]. Pt updated.  Cristina at [facility] notified    [phone] voicemail left for Dr Alycia Bence requesting return call to notify of peer to peer to see if she would be available to complete    [phone] pt's son updated

## 2022-06-17 NOTE — ED PROVIDER NOTES
[address] [facility]     Emergency Department     Faculty Attestation        I performed a history and physical examination of the patient and discussed management with the resident. I reviewed the residents note and agree with the documented findings and plan of care. Any areas of disagreement are noted on the chart. I was personally present for the key portions of any procedures. I have documented in the chart those procedures where I was not present during the key portions. I have reviewed the emergency nurses triage note. I agree with the chief complaint, past medical history, past surgical history, allergies, medications, social and family history as documented unless otherwise noted below. For Physician Assistant/ Nurse Practitioner cases/documentation I have personally evaluated this patient and have completed at least one if not all key elements of the E/M (history, physical exam, and MDM). Additional findings are as noted. Vital Signs: BP: 134/83  Heart Rate: 59  Resp: 20  Temp: 97.5 °F (36.4 °C) SpO2: 99 %  PCP:  Horton Cheadle, APRN - CNP    Pertinent Comments:     Patient is an 59-year-old male here for recent NSTEMI with catheterization not requiring stent however. Patient was to go to rehab today and was sent to [facility] however when they arrived they were told that the preauthorization has been declined for some reason. Patient is having no new symptoms but needs rehab facility to recover at. There were return here for readmission and placement. Critical Care  None    This patient was evaluated in the Emergency Department for symptoms described in the history of present illness. He/she was evaluated in the context of the global COVID-19 pandemic, which necessitated consideration that the patient might be at risk for infection with the SARS-CoV-2 virus that causes COVID-19.  Institutional protocols and algorithms that pertain to the evaluation of patients at risk for COVID-19 are in a state of rapid change based on information released by regulatory bodies including the CDC and federal and state organizations. These policies and algorithms were followed during the patient's care in the ED. (Please note that portions of this note were completed with a voice recognition program. Efforts were made to edit the dictations but occasionally words are mis-transcribed.  Whenever words are used in this note in any gender, they shall be construed as though they were used in the gender appropriate to the circumstances; and whenever words are used in this note in the singular or plural form, they shall be construed as though they were used in the form appropriate to the circumstances.)    MD Kandi Marino  Attending Emergency Medicine Physician             Queenie Comer MD  06/16/22 [id number]

## 2022-06-17 NOTE — H&P
[facility]  Office: [address], DO, Mya Fischer, DO, Stephan Brizuela, DO, Janessa Naik Christopher, DO, Natasha Wallace MD, Ye Rausch MD, Jennifer Thibodeaux MD, Arnaldo Lopez MD, Ernie Whittington MD, Christoph Bond MD, Joselin Yang MD, Melinda Bhatti, DO, Yanni Lou MD,  Gwen Mccurdy DO, Sonya Rincon MD, Cordelia Holman MD, Harpreet Lindo MD, Jennifer Roland DO, Lexy Doe MD, Aminta Turpin MD, Briseyda Giron, DO, Rani Silva MD, Marissa Atkins, [facility], [facility], CNP, Danielle Perez, CNP, Javier Parisi, CNP, Jenn Baca, CNP, Colin Power, CNP, Alban Babcock PA-C, Marcell Jones, [facility], Eusebio Medley, CNP, Suhas Weiss, CNP, Seng Montalvo, CNP, Wil Driscoll, [facility], [facility], [facility], Maxim Bryant, CNP, Elian Lewis, CNP, Harriet Freire, [address]    HISTORY AND PHYSICAL EXAMINATION            Date:   6/17/2022  Patient name:  Cadence Muñoz  Date of admission:  6/16/2022 11:11 PM  MRN:   [id number]  Account:  [de-identified]  YOB: 1940  PCP:    JW Moore CNP  Room:   2022/2022-01  Code Status:    Full Code    Chief Complaint:   \"I'm back again\"    History Obtained From:     Patient and son    History of Present Illness:     Cadence Muñoz is a 79 y.o.  male with history of parkinson's with frequent falls, chronic pain/DJD/OA, ckd 3, htn, hlp, cad s/p prior stent to lad recently admitted after nstemi s/p cath with patent stent lad, nonobstructive cad, continued on medical management and was recommended for rehab with recurrent falls/ progressive weakness. Unfortunately, patient arrived at rehab earlier last night and was sent back secondary to insurance issues. Patient denies any complaints other than chronic hand arthritis with pain on occasion. States back pain stable. Does still get dizzy with activity. Denies syncope. Denies palpitations,  Denies cp, sob.   No f/c.  Denies n/v/abd pain. Past Medical History:     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 ([facility] [address] 3/18/2011    d/t being hurt on the job    CKD (chronic kidney disease) stage 2, GFR 60-89 ml/min     CKD (chronic kidney disease) stage 3, GFR 30-59 ml/min (HCC)     Diverticulosis of colon     GERD (gastroesophageal reflux disease)     Impaired renal function     MRSA (methicillin resistant staph aureus) culture positive 9/23/2015    leg    Osteoarthritis of knee     Left    Parkinson disease ([facility] [address]     Proteinuria     Skull fracture ([facility] [address] 3/18/2011    d/t 12-foot drop on the job    Temporary loss of eyesight     periodically, happened x7    [facility]        Past Surgical History:     Past Surgical History:   Procedure Laterality Date    COLONOSCOPY  11/02/2012    poor prep, tubular adenoma    COLONOSCOPY  09/19/2017    diverticulosis, multiple polyps as described, spot marking done, pathology-tubular adenoma x 4    FL COLSC FLX W/RMVL OF TUMOR POLYP LESION SNARE TQ N/A 9/19/2017    COLONOSCOPY POLYPECTOMY SNARE/COLD BIOPSY with tatooing and apc transverse colon performed by Alee Bower MD at [address] Right     rotator cuff        Medications Prior to Admission:     Prior to Admission medications    Medication Sig Start Date End Date Taking? Authorizing Provider   gabapentin (NEURONTIN) 100 MG capsule Take 1 capsule by mouth 3 times daily for 30 days. 6/15/22 7/15/22  Prashanth Castaneda MD   isosorbide mononitrate (IMDUR) 30 MG extended release tablet Take 1 tablet by mouth daily 6/10/22   JW Ambrocio - NP   nitroGLYCERIN (NITROSTAT) 0.4 MG SL tablet up to max of 3 total doses.  If no relief after 1 dose, call 911. 6/9/22   JW Ambrocio - NP   metoprolol tartrate (LOPRESSOR) 25 MG tablet Take 0.5 tablets by mouth 2 times daily 6/9/22   JW Ambrocio - NP   lidocaine 4 % external patch Place 1 patch onto the skin daily for 15 days 6/9/22 6/24/22  Denver Parks, APRN - NP   entacapone (COMTAN) 200 MG tablet TAKE ONE (1) TABLET BY MOUTH FOUR (4) TIMES DAILY WITH SINEMET 5/26/22   Trace Gonzalez MD   magnesium oxide (MAG-OX) 400 (240 Mg) MG tablet TAKE 1 TABLET BY MOUTH ONCE DAILY 5/23/22   JW Cortez CNP   atorvastatin (LIPITOR) 40 MG tablet take 1 tablet by mouth once daily 5/17/22   JW Cortez CNP   diclofenac sodium (VOLTAREN) 1 % GEL Apply 2 g topically 2 times daily as needed for Pain (joint pain) 5/9/22   JW Burnette NP   amitriptyline (ELAVIL) 10 MG tablet Take 1 tablet by mouth nightly 4/5/22   Nafisa Manuel MD   esomeprazole (NEXIUM) 20 MG delayed release capsule TAKE 1 CAPSULE BY MOUTH ONCE DAILY IN THE MORNING BEFORE BREAKFAST 2/23/22   JW Cortez CNP   rOPINIRole (REQUIP) 0.25 MG tablet TAKE ONE (1) TABLET BY MOUTH THREE (3) TIMES DAILY 2/23/22   JW Cortez CNP   carbidopa-levodopa (SINEMET)  MG per tablet TAKE 1 TABLET BY MOUTH 4 TIMES DAILY 12/3/21 3/18/22  JW Andrade CNP   clopidogrel (PLAVIX) 75 MG tablet TAKE 1 TABLET BY MOUTH ONCE DAILY  11/5/21   JW Andrade CNP   allopurinol (ZYLOPRIM) 100 MG tablet TAKE 1 TABLET BY MOUTH ONCE DAILY  11/5/21   JW Andrade CNP   acetaminophen (TYLENOL 8 HOUR ARTHRITIS PAIN) 650 MG extended release tablet Take 1 tablet by mouth every 8 hours as needed for Pain 6/14/21   JW Cortez CNP   butalbital-acetaminophen-caffeine (FIORICET, ESGIC) -40 MG per tablet Take 1 tab prn only for severe headache. Can repeat after 4 hrs if needed. Max 2 tabs/24 hrs. Max 4 tabs/week 6/14/21   JW Cortez CNP        Allergies:     Dye [iodides] and Aspirin    Social History:     Tobacco:    reports that he has quit smoking. He has never used smokeless tobacco.  Alcohol:      reports no history of alcohol use.   Drug Use:  reports no history of drug use. Denies tobacco, excessive etoh or illegal drugs    Family History:     Family History   Problem Relation Age of Onset    No Known Problems Mother     No Known Problems Father    4 healthy kids    Review of Systems:     Positive and Negative as described in HPI. Review of Systems   Denies flu like symptoms. Denies covid exposure. Did get vaccine. Denies urinary symptoms, no dysuria, hematuria. Denies diabetes, prior stroke/tia, jennifer, dvt/pe. Denies bleeding issues, easy bruising. Ros otherwise negative 10 systems reviewed and negative    Physical Exam:   BP (!) 107/55   Pulse 59   Temp 98.9 °F (37.2 °C) (Oral)   Resp 22   SpO2 99%   Temp (24hrs), Av °F (36.7 °C), Min:97.5 °F (36.4 °C), Max:98.9 °F (37.2 °C)    No results for input(s): POCGLU in the last 72 hours.   No intake or output data in the 24 hours ending 22 0139    Physical Exam   General sitting up in bed watching TV, awake and alert pleasant appropriate, talking with son, oriented follows commands  Eye exam pupils equally round reactive sclera nonicteric normal horizontal gaze  ENT oral pharynx with moist mucous membranes face symmetric neck supple  Cardiovascular regular rate and rhythm normal S1-S2 no JVD sitting upright, no murmurs rubs or gallops  Lungs slightly diminished at bases with adequate air exchange nonlabored no tachypnea no accessory muscle use, no wheezing or rhonchi  GI soft nontender nondistended bowel sounds present  Vascular intact dorsalis pedis posterior tibialis with out significant pedal edema in feet  Derm adequate foot hygiene, no rashes lesions or skin infections  Musculoskeletal passive range of motion of upper and lower limbs unremarkable, no synovitis or joint effusions, arthritic changes in hands  Neuro nonfocal strength symmetric in upper and lower limbs without drift, sensation grossly intact, no rigidity    Investigations:      Laboratory Testing:  No results found for this or any previous visit (from the past 24 hour(s)). Imaging/Diagnostics:  No results found. LMCA: Normal 0% stenosis. LAD: Patent stent in ostial-proximal area with 10-20% in stent restenosis   Mid to distal vessel is small with diffuse 40% stenosis, somewhat improved   after IC nitro. LCx: Mild irregularities 10-20%. RCA: Mild irregularities 10-20%.      Conclusions:      1. Single vessel coronary artery disease.    2. Patent stent in ostial LAD    3. Mild Nonobstructive CAD otherwise    4. LV gram not done due to chronic renal insufficiency       Echo 09/01/2021  Summary  Left ventricle is normal in size. Mild left ventricular hypertrophy. Global left ventricular systolic function is normal. Estimated LV EF 60-65  %. Grade I (mild) left ventricular diastolic dysfunction. Mild aortic insufficiency. Mild mitral regurgitation. Mild tricuspid regurgitation. Mild pulmonary hypertension. Estimated right ventricular systolic pressure  is 77LVCT.              Assessment :      Hospital Problems           Last Modified POA    * (Principal) NSTEMI (non-ST elevated myocardial infarction) ([facility] [address] 6/17/2022 Yes    Syncope : posterior circulation stroke vs Neurocardiogenic syncope  6/17/2022 Yes    General weakness 6/17/2022 Yes    Chronic renal disease, stage III [facility]) [[id number]] 6/17/2022 Yes    Chronic pain of multiple joints (Chronic) 6/17/2022 Yes    Gout 6/17/2022 Yes    Frequent falls 6/17/2022 Yes          Plan:     1. Admit for observation for reevaluation for placement status post readmission, discharged yesterday to skilled facility however issues with insurance and returned back to the hospital.  Resume therapy, consult social work for assistance,   2. Continue Tylenol for pain control. 3. Continue cardiac regimen as previously recommended  4. Continue Parkinson's medications      questions and concerns and treatment plan discussed with patient and son present.   Labs and prior chart reviewed in detail. Anticipate likely dc when able to make arrangements for skilled facility as previously recommended by therapy.       Consultations:   IP CONSULT TO HOSPITALIST       Aron Green MD  6/17/2022  1:39 AM    Copy sent to JW Prince - CNP

## 2022-06-17 NOTE — ACP (ADVANCE CARE PLANNING)
..Advance Care Planning     Advance Care Planning Activator (Inpatient)  Conversation Note      Date of ACP Conversation: 6/17/2022     Conversation Conducted with: Patient with Decision Making Capacity    ACP Activator: Zhao Reyes, [address] :     Current Designated Health Care Decision Maker:     Primary Decision Maker: Eb Duran Child - [phone]    Secondary Decision Maker: Sheyla Carrillo - [phone]  Click here to complete Healthcare Decision Makers including section of the Healthcare Decision Maker Relationship (ie \"Primary\")  Today we documented Decision Maker(s) consistent with Legal Next of Kin hierarchy. Care Preferences    Ventilation: \"If you were in your present state of health and suddenly became very ill and were unable to breathe on your own, what would your preference be about the use of a ventilator (breathing machine) if it were available to you? \"      Would the patient desire the use of ventilator (breathing machine)?: yes    \"If your health worsens and it becomes clear that your chance of recovery is unlikely, what would your preference be about the use of a ventilator (breathing machine) if it were available to you? \"     Would the patient desire the use of ventilator (breathing machine)?: Yes      Resuscitation  \"CPR works best to restart the heart when there is a sudden event, like a heart attack, in someone who is otherwise healthy. Unfortunately, CPR does not typically restart the heart for people who have serious health conditions or who are very sick. \"    \"In the event your heart stopped as a result of an underlying serious health condition, would you want attempts to be made to restart your heart (answer \"yes\" for attempt to resuscitate) or would you prefer a natural death (answer \"no\" for do not attempt to resuscitate)? \" yes       [] Yes   [] No   Educated Patient / Decision Maker regarding differences between Advance Directives and portable DNR orders.     Length of ACP Conversation in minutes:      Conversation Outcomes:  [x] ACP discussion completed  [] Existing advance directive reviewed with patient; no changes to patient's previously recorded wishes  [] New Advance Directive completed  [] Portable Do Not Rescitate prepared for Provider review and signature  [] POLST/POST/MOLST/MOST prepared for Provider review and signature      Follow-up plan:    [] Schedule follow-up conversation to continue planning  [] Referred individual to Provider for additional questions/concerns   [] Advised patient/agent/surrogate to review completed ACP document and update if needed with changes in condition, patient preferences or care setting    [] This note routed to one or more involved healthcare providers

## 2022-06-18 PROBLEM — Z86.79 HISTORY OF SUBDURAL HEMATOMA: Status: ACTIVE | Noted: 2022-06-18

## 2022-06-18 LAB
ALBUMIN SERPL-MCNC: 3.2 G/DL (ref 3.5–5.2)
ALBUMIN/GLOBULIN RATIO: 1.1 (ref 1–2.5)
ALP BLD-CCNC: 144 U/L (ref 40–129)
ALT SERPL-CCNC: 35 U/L (ref 5–41)
ANION GAP SERPL CALCULATED.3IONS-SCNC: 12 MMOL/L (ref 9–17)
ANION GAP SERPL CALCULATED.3IONS-SCNC: 14 MMOL/L (ref 9–17)
AST SERPL-CCNC: 23 U/L
BILIRUB SERPL-MCNC: 0.34 MG/DL (ref 0.3–1.2)
BUN BLDV-MCNC: 16 MG/DL (ref 8–23)
BUN BLDV-MCNC: 17 MG/DL (ref 8–23)
CALCIUM SERPL-MCNC: 8.9 MG/DL (ref 8.6–10.4)
CALCIUM SERPL-MCNC: 9 MG/DL (ref 8.6–10.4)
CHLORIDE BLD-SCNC: 100 MMOL/L (ref 98–107)
CHLORIDE BLD-SCNC: 102 MMOL/L (ref 98–107)
CHOLESTEROL/HDL RATIO: 2.9
CHOLESTEROL: 100 MG/DL
CO2: 21 MMOL/L (ref 20–31)
CO2: 22 MMOL/L (ref 20–31)
CREAT SERPL-MCNC: 1.23 MG/DL (ref 0.7–1.2)
CREAT SERPL-MCNC: 1.33 MG/DL (ref 0.7–1.2)
GFR AFRICAN AMERICAN: >60 ML/MIN
GFR AFRICAN AMERICAN: >60 ML/MIN
GFR NON-AFRICAN AMERICAN: 51 ML/MIN
GFR NON-AFRICAN AMERICAN: 56 ML/MIN
GFR SERPL CREATININE-BSD FRML MDRD: ABNORMAL ML/MIN/{1.73_M2}
GFR SERPL CREATININE-BSD FRML MDRD: ABNORMAL ML/MIN/{1.73_M2}
GLUCOSE BLD-MCNC: 145 MG/DL (ref 70–99)
GLUCOSE BLD-MCNC: 149 MG/DL (ref 70–99)
HCT VFR BLD CALC: 40.4 % (ref 40.7–50.3)
HDLC SERPL-MCNC: 34 MG/DL
HEMOGLOBIN: 13.2 G/DL (ref 13–17)
LDL CHOLESTEROL: 47 MG/DL (ref 0–130)
MAGNESIUM: 2 MG/DL (ref 1.6–2.6)
MAGNESIUM: 2.1 MG/DL (ref 1.6–2.6)
MCH RBC QN AUTO: 32.3 PG (ref 25.2–33.5)
MCHC RBC AUTO-ENTMCNC: 32.7 G/DL (ref 28.4–34.8)
MCV RBC AUTO: 98.8 FL (ref 82.6–102.9)
NRBC AUTOMATED: 0 PER 100 WBC
PDW BLD-RTO: 14.5 % (ref 11.8–14.4)
PLATELET # BLD: 175 K/UL (ref 138–453)
PMV BLD AUTO: 9.7 FL (ref 8.1–13.5)
POTASSIUM SERPL-SCNC: 4.4 MMOL/L (ref 3.7–5.3)
POTASSIUM SERPL-SCNC: 4.7 MMOL/L (ref 3.7–5.3)
RBC # BLD: 4.09 M/UL (ref 4.21–5.77)
SODIUM BLD-SCNC: 135 MMOL/L (ref 135–144)
SODIUM BLD-SCNC: 136 MMOL/L (ref 135–144)
TOTAL PROTEIN: 6.1 G/DL (ref 6.4–8.3)
TRIGL SERPL-MCNC: 97 MG/DL
WBC # BLD: 5.8 K/UL (ref 3.5–11.3)

## 2022-06-18 PROCEDURE — 6360000002 HC RX W HCPCS: Performed by: NURSE PRACTITIONER

## 2022-06-18 PROCEDURE — 6370000000 HC RX 637 (ALT 250 FOR IP): Performed by: NURSE PRACTITIONER

## 2022-06-18 PROCEDURE — 80053 COMPREHEN METABOLIC PANEL: CPT

## 2022-06-18 PROCEDURE — G0378 HOSPITAL OBSERVATION PER HR: HCPCS

## 2022-06-18 PROCEDURE — 96372 THER/PROPH/DIAG INJ SC/IM: CPT

## 2022-06-18 PROCEDURE — 83735 ASSAY OF MAGNESIUM: CPT

## 2022-06-18 PROCEDURE — 80061 LIPID PANEL: CPT

## 2022-06-18 PROCEDURE — 97530 THERAPEUTIC ACTIVITIES: CPT

## 2022-06-18 PROCEDURE — 97161 PT EVAL LOW COMPLEX 20 MIN: CPT

## 2022-06-18 PROCEDURE — 6370000000 HC RX 637 (ALT 250 FOR IP): Performed by: INTERNAL MEDICINE

## 2022-06-18 PROCEDURE — 85027 COMPLETE CBC AUTOMATED: CPT

## 2022-06-18 PROCEDURE — 99225 PR SBSQ OBSERVATION CARE/DAY 25 MINUTES: CPT | Performed by: INTERNAL MEDICINE

## 2022-06-18 PROCEDURE — 36415 COLL VENOUS BLD VENIPUNCTURE: CPT

## 2022-06-18 RX ORDER — LANOLIN ALCOHOL/MO/W.PET/CERES
CREAM (GRAM) TOPICAL 2 TIMES DAILY
Status: DISCONTINUED | OUTPATIENT
Start: 2022-06-18 | End: 2022-06-20 | Stop reason: HOSPADM

## 2022-06-18 RX ADMIN — GABAPENTIN 100 MG: 100 CAPSULE ORAL at 08:39

## 2022-06-18 RX ADMIN — PANTOPRAZOLE SODIUM 40 MG: 40 TABLET, DELAYED RELEASE ORAL at 08:39

## 2022-06-18 RX ADMIN — DESMOPRESSIN ACETATE 40 MG: 0.2 TABLET ORAL at 22:00

## 2022-06-18 RX ADMIN — ROPINIROLE HYDROCHLORIDE 0.25 MG: 0.25 TABLET, FILM COATED ORAL at 08:39

## 2022-06-18 RX ADMIN — GABAPENTIN 100 MG: 100 CAPSULE ORAL at 14:01

## 2022-06-18 RX ADMIN — ENTACAPONE 200 MG: 200 TABLET, FILM COATED ORAL at 08:39

## 2022-06-18 RX ADMIN — GABAPENTIN 100 MG: 100 CAPSULE ORAL at 21:59

## 2022-06-18 RX ADMIN — MAGNESIUM GLUCONATE 500 MG ORAL TABLET 400 MG: 500 TABLET ORAL at 08:39

## 2022-06-18 RX ADMIN — ENOXAPARIN SODIUM 40 MG: 100 INJECTION SUBCUTANEOUS at 08:39

## 2022-06-18 RX ADMIN — ALLOPURINOL 100 MG: 100 TABLET ORAL at 08:39

## 2022-06-18 RX ADMIN — ENTACAPONE 200 MG: 200 TABLET, FILM COATED ORAL at 14:00

## 2022-06-18 RX ADMIN — CARBIDOPA AND LEVODOPA 1 TABLET: 25; 100 TABLET ORAL at 22:00

## 2022-06-18 RX ADMIN — CLOPIDOGREL 75 MG: 75 TABLET, FILM COATED ORAL at 08:40

## 2022-06-18 RX ADMIN — CARBIDOPA AND LEVODOPA 1 TABLET: 25; 100 TABLET ORAL at 08:39

## 2022-06-18 RX ADMIN — ROPINIROLE HYDROCHLORIDE 0.25 MG: 0.25 TABLET, FILM COATED ORAL at 21:59

## 2022-06-18 RX ADMIN — CARBIDOPA AND LEVODOPA 1 TABLET: 25; 100 TABLET ORAL at 14:00

## 2022-06-18 RX ADMIN — METOPROLOL TARTRATE 12.5 MG: 25 TABLET ORAL at 22:00

## 2022-06-18 RX ADMIN — Medication: at 22:02

## 2022-06-18 RX ADMIN — ENTACAPONE 200 MG: 200 TABLET, FILM COATED ORAL at 21:59

## 2022-06-18 RX ADMIN — ROPINIROLE HYDROCHLORIDE 0.25 MG: 0.25 TABLET, FILM COATED ORAL at 14:00

## 2022-06-18 RX ADMIN — ENTACAPONE 200 MG: 200 TABLET, FILM COATED ORAL at 17:36

## 2022-06-18 RX ADMIN — AMITRIPTYLINE HYDROCHLORIDE 10 MG: 10 TABLET, FILM COATED ORAL at 21:59

## 2022-06-18 RX ADMIN — CARBIDOPA AND LEVODOPA 1 TABLET: 25; 100 TABLET ORAL at 17:35

## 2022-06-18 ASSESSMENT — ENCOUNTER SYMPTOMS
ABDOMINAL PAIN: 0
NAUSEA: 0
VOMITING: 0
SHORTNESS OF BREATH: 0
COUGH: 0

## 2022-06-18 ASSESSMENT — PAIN SCALES - GENERAL: PAINLEVEL_OUTOF10: 4

## 2022-06-18 NOTE — PROGRESS NOTES
[facility]  Office: [address], DO, Pierre Beauchamp, DO, Georgina Moore, DO, Cole White, DO, Pat Finn MD, John Fontaine MD, Anayeli Barnes MD, Trip Bocanegra MD, Corey Barrios MD, Lotus Majano MD, Mackenzie Caputo MD, Wade Barcenas DO, Homer Garrido MD,  Nafisa Angela DO, Michelle Calixto MD, Nohemi Benavides MD, Home Zapata DO, Kimberli Carbajal MD, Ace Arvizu MD, Main Hernandez DO, Traci Solis MD, Negar Bruno MD, Maryse Frank, [facility], [facility], [facility], Kathleen Crooks CNP, Adry Platt, CNP, Marcos Reynoso, CNP, Leon Mackey, CNP, Arpita Plata PA-C, Kelli Dewey, [facility], Lay Coronado, CNP, Tru Frank CNP, Maryse Flores, CNP, Nic Matute, [facility], [facility], [facility], Lavonne Soni, CNP, Yashira Farris, CNP, Ana Pacheco, [facility]    Progress Note    6/18/2022    2:27 PM    Name:   Yanira Sosa  MRN:     [id number]     Acct:      [de-identified]   Room:   2022/2022-01   Day:  0  Admit Date:  6/16/2022 11:11 PM    PCP:   JW Walker CNP  Code Status:  Full Code    Subjective:     C/C:   Return to the hospital from his skilled nursing facility due to insurance issues    Interval History Status:   Comfortable  No chest pain  Requesting \"lotion\" for itchy skin on trunk  Awaiting placement  No further syncope    Data Base Updates:  [id number] High mg/dL     JQE08hh/dL   CREATININE1.33 High        Brief History:     As documented in the medical record:  Matthew Jeffries is a 79 y.o.  male with history of parkinson's with frequent falls, chronic pain/DJD/OA, ckd 3, htn, hlp, cad s/p prior stent to lad recently admitted after nstemi s/p cath with patent stent lad, nonobstructive cad, continued on medical management and was recommended for rehab with recurrent falls/ progressive weakness.  Unfortunately, patient arrived at rehab earlier last night and was sent back secondary to insurance issues. \"    The intitial assessment and plan included:  \"  Hospital Problems            Last Modified POA     * (Principal) NSTEMI (non-ST elevated myocardial infarction) ([facility] [address] 6/17/2022 Yes     Syncope : posterior circulation stroke vs Neurocardiogenic syncope  6/17/2022 Yes     General weakness 6/17/2022 Yes     Chronic renal disease, stage III [facility]) [[id number]] 6/17/2022 Yes     Chronic pain of multiple joints (Chronic) 6/17/2022 Yes     Gout 6/17/2022 Yes     Frequent falls 6/17/2022 Yes          1. Admit for observation for reevaluation for placement status post readmission, discharged yesterday to skilled facility however issues with insurance and returned back to the hospital.  Resume therapy, consult social work for assistance,   2. Continue Tylenol for pain control. 3. Continue cardiac regimen as previously recommended  4. Continue Parkinson's medications\"     Cath on 6/8/2022 showed single vessel CAD with patent stent in LAD    Past Medical History:   has a past medical history of 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 ([facility] [address] CKD (chronic kidney disease) stage 2, GFR 60-89 ml/min, CKD (chronic kidney disease) stage 3, GFR 30-59 ml/min ([facility]), Diverticulosis of colon, GERD (gastroesophageal reflux disease), Impaired renal function, MRSA (methicillin resistant staph aureus) culture positive, Osteoarthritis of knee, Parkinson disease ([address] Proteinuria, Skull fracture ([facility] [address] Temporary loss of eyesight, and Tubular adenoma of colon. Social History:   reports that he has quit smoking. He has never used smokeless tobacco. He reports that he does not drink alcohol and does not use drugs. Family History:   Family History   Problem Relation Age of Onset    No Known Problems Mother     No Known Problems Father        Review of Systems:     Review of Systems   Constitutional: Positive for appetite change (Diminished) and fatigue (Exercise capacity diminished).    Respiratory: Negative for cough and shortness of breath. Cardiovascular: Negative for chest pain and palpitations. Gastrointestinal: Negative for abdominal pain, nausea and vomiting. Genitourinary: Negative for flank pain and hematuria. Musculoskeletal: Positive for arthralgias, gait problem (Gait unsteady, history of falls) and myalgias. The patient has chronic arthralgias and myalgias  No new musculoskeletal complaints    Neurological: Positive for tremors, syncope (No further episodes) and weakness (Generalized). Negative for light-headedness. Physical Examination:        Vitals  BP (!) 95/37   Pulse 58   Temp 98.2 °F (36.8 °C) (Oral)   Resp 16   Wt 184 lb 1.4 oz (83.5 kg)   SpO2 98%   BMI 28.83 kg/m²   Temp (24hrs), Av.4 °F (36.9 °C), Min:98.2 °F (36.8 °C), Max:98.7 °F (37.1 °C)    No results for input(s): POCGLU in the last 72 hours. Physical Exam  Vitals reviewed. Constitutional:       General: He is not in acute distress. Appearance: He is not diaphoretic. HENT:      Head: Normocephalic. Nose: Nose normal.   Eyes:      General: No scleral icterus. Conjunctiva/sclera: Conjunctivae normal.   Neck:      Trachea: No tracheal deviation. Cardiovascular:      Rate and Rhythm: Normal rate and regular rhythm. Pulmonary:      Effort: Pulmonary effort is normal. No respiratory distress. Breath sounds: Normal breath sounds. No wheezing or rales. Chest:      Chest wall: No tenderness. Abdominal:      General: There is no distension. Palpations: Abdomen is soft. Tenderness: There is no abdominal tenderness. Musculoskeletal:         General: No tenderness. Cervical back: Neck supple. Skin:     General: Skin is warm and dry. Findings: No rash. Neurological:      Mental Status: He is alert and oriented to person, place, and time. Comments: Resting tremor  Bradykinesia  Masked facies         Medications: Allergies:     Allergies   Allergen Reactions    Dye [Iodides]      pain    Aspirin      Brain bleed         Current Meds:   Scheduled Meds:    Hydrocerin   Topical BID    amitriptyline  10 mg Oral Nightly    allopurinol  100 mg Oral Daily    carbidopa-levodopa  1 tablet Oral 4x Daily    clopidogrel  75 mg Oral Daily    entacapone  200 mg Oral 4x Daily    pantoprazole  40 mg Oral QAM AC    gabapentin  100 mg Oral TID    isosorbide mononitrate  30 mg Oral Daily    magnesium oxide  400 mg Oral Daily    metoprolol tartrate  12.5 mg Oral BID    rOPINIRole  0.25 mg Oral TID    sodium chloride flush  5-40 mL IntraVENous 2 times per day    enoxaparin  40 mg SubCUTAneous Daily    atorvastatin  40 mg Oral Nightly     Continuous Infusions:    sodium chloride       PRN Meds: sodium chloride flush, sodium chloride, ondansetron **OR** ondansetron, acetaminophen **OR** acetaminophen, magnesium hydroxide, potassium chloride **OR** potassium alternative oral replacement **OR** potassium chloride, potassium chloride, magnesium sulfate, nitroGLYCERIN    Data:     I/O (24Hr):     Intake/Output Summary (Last 24 hours) at 6/18/2022 1427  Last data filed at 6/18/2022 0600  Gross per 24 hour   Intake --   Output 1250 ml   Net -1250 ml       Labs:  Hematology:  Recent Labs     06/18/22  0601   WBC 5.8   RBC 4.09*   HGB 13.2   HCT 40.4*   MCV 98.8   MCH 32.3   MCHC 32.7   RDW 14.5*      MPV 9.7     Chemistry:  Recent Labs     06/17/22  2307 06/18/22  0601    136   K 4.7 4.4    102   CO2 21 22   GLUCOSE 145* 149*   BUN 16 17   CREATININE 1.23* 1.33*   MG 2.1 2.0   ANIONGAP 14 12   LABGLOM 56* 51*   GFRAA >60 >60   CALCIUM 8.9 9.0     Recent Labs     06/18/22  0601   PROT 6.1*   LABALBU 3.2*   AST 23   ALT 35   ALKPHOS 144*   BILITOT 0.34   CHOL 100   HDL 34*   LDLCHOLESTEROL 47   CHOLHDLRATIO 2.9   TRIG 97     ABG:No results found for: POCPH, PHART, PH, POCPCO2, DSL1YEM, PCO2, POCPO2, PO2ART, PO2, POCHCO3, AFQ3QMX, HCO3, NBEA, PBEA, BEART, BE, THGBART, THB, HGC7UYZ, PFUO6EVB, G2WZAZCX, O2SAT, FIO2  Lab Results   Component Value Date/Time    SPECIAL RFA 16ML 06/07/2022 10:46 AM     Lab Results   Component Value Date/Time    CULTURE NO GROWTH 5 DAYS 06/07/2022 10:46 AM       Radiology:  No results found. Assessment:        Primary Problem  NSTEMI (non-ST elevated myocardial infarction) [facility])    Active Hospital Problems    Diagnosis Date Noted    Syncope : posterior circulation stroke vs Neurocardiogenic syncope  [R55] 07/09/2013     Priority: High    History of subdural hematoma [Z86.79] 06/18/2022     Priority: Medium    NSTEMI (non-ST elevated myocardial infarction) ([facility] [facility] [I21.4] 06/17/2022     Priority: Medium    Gout [M10.9] 06/07/2022     Priority: Medium    Chronic pain of multiple joints [M25.50, G89.29] 05/19/2022     Priority: Medium    Chronic renal disease, stage III ([facility] [facility] [738053] [N18.30] 04/20/2022     Priority: Medium    Overweight (BMI 25.0-29. 9) [E66.3] 08/16/2021    Frequent falls [R29.6]     General weakness [R53.1] 08/13/2021    Parkinson disease ([facility] [address] [facility] 01/16/2020    Pulmonary hypertension, mild (HCC) [I27.20] 08/31/2017    Essential hypertension [I10] 03/23/2015         Plan:        The patient has been readmitted for placement  PT/OT  Fall precautions  Neurology follow-up Dr. Grisel Santillan bun and creatinine   Renal follow-up Dr. Pat Garcia  Avoid nephrotoxins  Pain management  Cardiology eval & f/u as scheduled   Blood Pressure - Monitor and control   DVT prophylaxis  Discharge planning  Will discharge when arrangements complete and ok with other services.   Follow-up with PCP in one week, JW Hannah CNP  Notify PCP of discharge     IP CONSULT TO HOSPITALIST  IP CONSULT TO SOCIAL WORK  IP CONSULT  [address] CONSULT TO [address] [place] [address], DO  6/18/2022  2:27 PM

## 2022-06-18 NOTE — PROGRESS NOTES
Physical Therapy  Facility/Department: [facility] CAR 2  Physical Therapy Initial Assessment    Name: Cadence Muñoz  : 1940  MRN: [id number]  Date of Service: 2022    Discharge Recommendations: Further therapy recommended at discharge. No chief complaint on file. \"Mina Gill is a 79 y. o.  male with history of parkinson's with frequent falls, chronic pain/DJD/OA, ckd 3, htn, hlp, cad s/p prior stent to lad recently admitted after nstemi s/p cath with patent stent lad, nonobstructive cad, continued on medical management and was recommended for rehab with recurrent falls/ progressive weakness. Unfortunately, patient arrived at rehab earlier last night and was sent back secondary to insurance issues. \"      PT Equipment Recommendations  Equipment Needed: No ([facility])      Patient Diagnosis(es): The encounter diagnosis was Chest pain, unspecified type. 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 ([facility] [address] CKD (chronic kidney disease) stage 2, GFR 60-89 ml/min, CKD (chronic kidney disease) stage 3, GFR 30-59 ml/min ([facility]), Diverticulosis of colon, GERD (gastroesophageal reflux disease), Impaired renal function, MRSA (methicillin resistant staph aureus) culture positive, Osteoarthritis of knee, Parkinson disease ([facility] [address] Proteinuria, Skull fracture ([facility] [address] Temporary loss of eyesight, and Tubular adenoma of colon. Past Surgical History:  has a past surgical history that includes Colonoscopy (2012); shoulder surgery (Right); pr colsc flx w/rmvl of tumor polyp lesion snare tq (N/A, 2017); and Colonoscopy (2017). Assessment   Body Structures, Functions, Activity Limitations Requiring Skilled Therapeutic Intervention: Decreased functional mobility ; Decreased strength;Decreased endurance;Decreased balance; Increased pain;Decreased ADL status; Decreased high-level IADLs  Assessment: Pt ambulates 1 ft RW CGA.   Pt currently is a high fall risk baseline.)  ADL Assistance: Independent  Homemaking Assistance: Independent  Homemaking Responsibilities: Yes  Ambulation Assistance: Independent  Transfer Assistance: Independent  Active : Yes  Mode of Transportation: SUV  Occupation: Retired  Type of Occupation:   Leisure & Hobbies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  Additional Comments: pt reports that neighbors are helpful and able to assist if needed. Vision/Hearing  Vision  Vision: Impaired  Vision Exceptions: Wears glasses at all times  Hearing  Hearing: Within functional limits    Cognition   Orientation  Overall Orientation Status: Within Functional Limits  Cognition  Overall Cognitive Status: Exceptions  Following Commands: Follows multistep commands with repitition; Follows multistep commands with increased time  Attention Span: Attends with cues to redirect        Gross Assessment  Sensation: Intact (pt denies n/t.  States he intermittently has burning sensation down arms.)     AROM RLE (degrees)  RLE AROM: WFL  AROM LLE (degrees)  LLE AROM : WFL  AROM RUE (degrees)  RUE AROM : Exceptions  RUE General AROM: Able to achieve only 100 deg AROM shoulder flexion  AROM LUE (degrees)  LUE AROM : Exceptions  LUE General AROM: Able to achieve only 100 deg AROM shoulder flexion  Strength RLE  Strength RLE: WFL  Strength LLE  Strength LLE: WFL  Strength RUE  Strength RUE: Exception  Comment: grossly 4+/5  Strength LUE  Strength LUE: Exception  Comment: grossly 4+/5           Bed mobility  Supine to Sit: Minimal assistance (for trunk progression)  Sit to Supine:  (pt retired up to a chair at the conclusion of today's session.)  Scooting: Contact guard assistance  Bed Mobility Comments: HOB elevated  Transfers  Sit to Stand: Contact guard assistance  Stand to sit: Contact guard assistance  Comment: RW for UE support, cues for hand placement  Ambulation  Surface: level tile  Device: Rolling Walker  Other Apparatus: O2 (2L per NC)  Assistance: Contact guard assistance  Quality of Gait: mildly unsteady, increased SAHBBIR, B knee flexed. Gait Deviations: Slow Ewa; Increased SHABBIR; Decreased step length  Distance: 1ft  Comments: No LOB noted. More Ambulation?: No  Stairs/Curb  Stairs?: No     Balance  Posture: Fair  Sitting - Static: Good  Sitting - Dynamic: Good;-  Standing - Static: Fair  Standing - Dynamic: Fair  Comments: assessed with RW                                          AM-PAC Score  AM-PAC Inpatient Mobility Raw Score : 17 (06/18/22 1504)  AM-PAC Inpatient T-Scale Score : 42.13 (06/18/22 1504)  Mobility Inpatient CMS 0-100% Score: 50.57 (06/18/22 1504)  Mobility Inpatient CMS G-Code Modifier : CK (06/18/22 1504)          Goals  Short Term Goals  Time Frame for Short term goals: 14 visits  Short term goal 1: Complete transfers with least restrictive AD and mod I  Short term goal 2: Complete 300 ft of gait with least restrictive AD and mod I  Short term goal 3: Complete 5 steps with RHR and mod I  Short term goal 4: Participate in 30 minutes of therapy to promote endurance       Education  Patient Education  Education Given To: Patient  Education Provided: Transfer Training;Plan of Care; Fall Prevention Strategies  Education Method: Verbal  Barriers to Learning: None  Education Outcome: Verbalized understanding      Therapy Time   Individual Concurrent Group Co-treatment   Time In 1100         Time Out 1137         Minutes 37         Timed Code Treatment Minutes: [facility], PT

## 2022-06-18 NOTE — PROGRESS NOTES
Assessment:  responded to patient's request for sacrament of anointing of the sick. Patient was reclining in his chair when  visited. Family was not present at the time. However, patient said he had strong family support. When asked how he was feeling, patient responded; \"better. \" Patient said he was raised Mormon and a member of [address] [place] [address]. Patient received sacrament of anointing of the sick. Patient said he had earlier received communion. Intervention:  provided presence, offered support, prayed with patient and reassured him that he was in good hands. Outcome: Patient expressed appreciation for the spiritual support he received. Plan: Follow up visits recommended for more prayers and support.

## 2022-06-18 NOTE — PLAN OF CARE
Problem: Safety - Adult  Goal: Free from fall injury  Outcome: Progressing  Flowsheets (Taken 6/17/2022 1939)  Free From Fall Injury: Instruct family/caregiver on patient safety     Problem: ABCDS Injury Assessment  Goal: Absence of physical injury  Outcome: Progressing     Problem: Chronic Conditions and Co-morbidities  Goal: Patient's chronic conditions and co-morbidity symptoms are monitored and maintained or improved  Outcome: Progressing     Problem: Skin/Tissue Integrity  Goal: Absence of new skin breakdown  Description: 1. Monitor for areas of redness and/or skin breakdown  2. Assess vascular access sites hourly  3. Every 4-6 hours minimum:  Change oxygen saturation probe site  4. Every 4-6 hours:  If on nasal continuous positive airway pressure, respiratory therapy assess nares and determine need for appliance change or resting period.   Outcome: Progressing

## 2022-06-18 NOTE — FLOWSHEET NOTE
[facility] [address]  PROGRESS NOTE    Shift date: 6/17/22  Shift day: Friday   Shift # 2    Room # 2022/2022-01   Name: Mila Youngblood                Pentecostalism: Michaeledie Guerrero 32 of Zoroastrianism: Unknown    Referral: Routine Visit    Admit Date & Time: 6/16/2022 11:11 PM    Assessment:  Mila Youngblood is a 79 y.o. male in the hospital because of chest pains/NSTEMI. Writer observed patient was laying in bed watching tv. Patient is devout Adventist and appreciated the visit and prayers.  was feeling anxious, compromised coping, concerns with suffering, peaceful, sad, and had increased stress levels. Intervention:  Writer introduced self and title as  Writer offered space for Patient  to express feelings, needs, and concerns and provided a ministry presence.  provided active listening, discussed belief system/Taoist practices, prayer, and nurtured hope. Outcome:  Patient was accepting, engaged in conversation, and was receptive.  distributed prayer cards, Rosary, and [place] New Testament. Patient said he had chest pain which actually is a NSTEMI.  will remain available as needed. Patient was grateful for the visitation and the emotional support. Plan:  Chaplains will remain available to offer spiritual and emotional support as needed. Electronically signed by Praneeth Womack Resident, on 6/17/2022 at 8:28 PM.  Timo Hilton  [phone]       06/17/22 2023   Encounter Summary   Service Provided For: Patient   Referral/Consult From: [address] Children;Family members   Last Encounter  06/17/22   Complexity of Encounter Moderate   Begin Time 1850   End Time  2024   Total Time Calculated 94 min   Encounter    Type Initial Screen/Assessment   Spiritual/Emotional needs   Type Spiritual Support   Assessment/Intervention/Outcome   Assessment Anxious; Compromised coping;Concerns with suffering;Decisional conflict; Peaceful;Sad;Stress overload   Intervention Active listening;Discussed belief system/Judaism practices/zoraida; Explored/Affirmed feelings, thoughts, concerns;Prayer (assurance of)/[place];Nurtured Hope;Sustaining Presence/Ministry of presence   Outcome Acceptance;Comfort;Coping;Encouraged;Expressed feelings, needs, and concerns;Engaged in conversation;Expressed Gratitude;Peace; Receptive;Venting emotion   Plan and Referrals   Plan/Referrals Provided reading/devotional materials; Referred to (Comment)  (Prient visitation)

## 2022-06-19 PROCEDURE — 6360000002 HC RX W HCPCS: Performed by: NURSE PRACTITIONER

## 2022-06-19 PROCEDURE — 96372 THER/PROPH/DIAG INJ SC/IM: CPT

## 2022-06-19 PROCEDURE — 1200000000 HC SEMI PRIVATE

## 2022-06-19 PROCEDURE — 6370000000 HC RX 637 (ALT 250 FOR IP): Performed by: NURSE PRACTITIONER

## 2022-06-19 PROCEDURE — 99224 PR SBSQ OBSERVATION CARE/DAY 15 MINUTES: CPT | Performed by: INTERNAL MEDICINE

## 2022-06-19 PROCEDURE — G0378 HOSPITAL OBSERVATION PER HR: HCPCS

## 2022-06-19 RX ORDER — ENTACAPONE 200 MG/1
TABLET ORAL
Qty: 90 TABLET | Refills: 3 | OUTPATIENT
Start: 2022-06-19

## 2022-06-19 RX ADMIN — ISOSORBIDE MONONITRATE 30 MG: 30 TABLET ORAL at 08:56

## 2022-06-19 RX ADMIN — ROPINIROLE HYDROCHLORIDE 0.25 MG: 0.25 TABLET, FILM COATED ORAL at 20:41

## 2022-06-19 RX ADMIN — ENTACAPONE 200 MG: 200 TABLET, FILM COATED ORAL at 17:19

## 2022-06-19 RX ADMIN — ENTACAPONE 200 MG: 200 TABLET, FILM COATED ORAL at 21:39

## 2022-06-19 RX ADMIN — Medication 113 G: at 21:38

## 2022-06-19 RX ADMIN — PANTOPRAZOLE SODIUM 40 MG: 40 TABLET, DELAYED RELEASE ORAL at 08:56

## 2022-06-19 RX ADMIN — ROPINIROLE HYDROCHLORIDE 0.25 MG: 0.25 TABLET, FILM COATED ORAL at 08:56

## 2022-06-19 RX ADMIN — MAGNESIUM GLUCONATE 500 MG ORAL TABLET 400 MG: 500 TABLET ORAL at 08:56

## 2022-06-19 RX ADMIN — CARBIDOPA AND LEVODOPA 1 TABLET: 25; 100 TABLET ORAL at 17:19

## 2022-06-19 RX ADMIN — GABAPENTIN 100 MG: 100 CAPSULE ORAL at 13:41

## 2022-06-19 RX ADMIN — AMITRIPTYLINE HYDROCHLORIDE 10 MG: 10 TABLET, FILM COATED ORAL at 20:43

## 2022-06-19 RX ADMIN — METOPROLOL TARTRATE 12.5 MG: 25 TABLET ORAL at 20:41

## 2022-06-19 RX ADMIN — DESMOPRESSIN ACETATE 40 MG: 0.2 TABLET ORAL at 20:42

## 2022-06-19 RX ADMIN — ROPINIROLE HYDROCHLORIDE 0.25 MG: 0.25 TABLET, FILM COATED ORAL at 13:41

## 2022-06-19 RX ADMIN — ENOXAPARIN SODIUM 40 MG: 100 INJECTION SUBCUTANEOUS at 08:57

## 2022-06-19 RX ADMIN — CARBIDOPA AND LEVODOPA 1 TABLET: 25; 100 TABLET ORAL at 08:56

## 2022-06-19 RX ADMIN — ENTACAPONE 200 MG: 200 TABLET, FILM COATED ORAL at 13:40

## 2022-06-19 RX ADMIN — ALLOPURINOL 100 MG: 100 TABLET ORAL at 08:56

## 2022-06-19 RX ADMIN — CARBIDOPA AND LEVODOPA 1 TABLET: 25; 100 TABLET ORAL at 13:40

## 2022-06-19 RX ADMIN — ACETAMINOPHEN 650 MG: 325 TABLET ORAL at 02:01

## 2022-06-19 RX ADMIN — ENTACAPONE 200 MG: 200 TABLET, FILM COATED ORAL at 08:56

## 2022-06-19 RX ADMIN — GABAPENTIN 100 MG: 100 CAPSULE ORAL at 08:56

## 2022-06-19 RX ADMIN — CARBIDOPA AND LEVODOPA 1 TABLET: 25; 100 TABLET ORAL at 20:43

## 2022-06-19 RX ADMIN — CLOPIDOGREL 75 MG: 75 TABLET, FILM COATED ORAL at 08:56

## 2022-06-19 RX ADMIN — GABAPENTIN 100 MG: 100 CAPSULE ORAL at 20:43

## 2022-06-19 ASSESSMENT — ENCOUNTER SYMPTOMS
ABDOMINAL PAIN: 0
NAUSEA: 0
SHORTNESS OF BREATH: 0
COUGH: 0
VOMITING: 0

## 2022-06-19 ASSESSMENT — PAIN SCALES - GENERAL: PAINLEVEL_OUTOF10: 8

## 2022-06-19 ASSESSMENT — PAIN DESCRIPTION - LOCATION: LOCATION: HEAD

## 2022-06-19 NOTE — PROGRESS NOTES
[facility]  Office: [address], DO, Jose Miguel Blackman, DO, Tobias Dawson, DO, Priscilla Regan Blood, DO, Joann Conrad MD, Richardson Pastrana MD, Teresa Meek MD, Brandi Jean-Baptiste MD, Nell Tineo MD, Maribel Thomas MD, Jann Stephens MD, Mena Cardona, DO, Tanya Johnson MD,  Bruna Carrizales, DO, Joseph Pastrana MD, Sherry Cho MD, Imani Matt, DO, Alis Vivar MD, Shahnaz Francis MD, Magnolia Galaviz DO, Kirby Ta MD, Lyn Valdes MD, Hanane Turcios [facility], [facility] Jaycee, CNP, Kalli Osorio, CNP, Poncho Govea, CNP, Chinedu Johns, CNP, Epi Brito, CNP, RANDA Lopez-C, Maria Eugenia Medina, DNP, Sindy Brothers, CNP, Giovana Johnson, CNP, Cherie Jung, CNP, Joshua Carroll, CNS, Shelby Allen, [facility], Jakub Schultz, CNP, Sherri Simon, CNP, Yanet Mcneil, [facility]    Progress Note    6/19/2022    4:13 PM    Name:   Aj Caba  MRN:     [id number]     Acct:      [de-identified]   Room:   2022/2022-01   Day:  0  Admit Date:  6/16/2022 11:11 PM    PCP:   JW Ortez CNP  Code Status:  Full Code    Subjective:     C/C:   Return to the hospital from his skilled nursing facility due to insurance issues    Interval History Status:   Improved  Comfortable  No chest pain  Reporting chronic arm stiffness due to tremor  Ate well  Still feels weak    Data Base Updates:  BP stable  Afebrile        Brief History:     As documented in the medical record:  Palak Bustamante is a 79 y.o.  male with history of parkinson's with frequent falls, chronic pain/DJD/OA, ckd 3, htn, hlp, cad s/p prior stent to lad recently admitted after nstemi s/p cath with patent stent lad, nonobstructive cad, continued on medical management and was recommended for rehab with recurrent falls/ progressive weakness. Unfortunately, patient arrived at rehab earlier last night and was sent back secondary to insurance issues. \"    The intitial assessment and plan included:  \"  Hospital Problems            Last Modified POA     * (Principal) NSTEMI (non-ST elevated myocardial infarction) ([facility] [address] 6/17/2022 Yes     Syncope : posterior circulation stroke vs Neurocardiogenic syncope  6/17/2022 Yes     General weakness 6/17/2022 Yes     Chronic renal disease, stage III [facility]) [270127] 6/17/2022 Yes     Chronic pain of multiple joints (Chronic) 6/17/2022 Yes     Gout 6/17/2022 Yes     Frequent falls 6/17/2022 Yes          1. Admit for observation for reevaluation for placement status post readmission, discharged yesterday to skilled facility however issues with insurance and returned back to the hospital.  Resume therapy, consult social work for assistance,   2. Continue Tylenol for pain control. 3. Continue cardiac regimen as previously recommended  4. Continue Parkinson's medications\"     Cath on 6/8/2022 showed single vessel CAD with patent stent in LAD    Discharge planning initiated    Past Medical History:   has a past medical history of 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 ([facility] [address] CKD (chronic kidney disease) stage 2, GFR 60-89 ml/min, CKD (chronic kidney disease) stage 3, GFR 30-59 ml/min ([facility]), Diverticulosis of colon, GERD (gastroesophageal reflux disease), Impaired renal function, MRSA (methicillin resistant staph aureus) culture positive, Osteoarthritis of knee, Parkinson disease ([facility] [address] Proteinuria, Skull fracture ([facility] [address] Temporary loss of eyesight, and Tubular adenoma of colon. Social History:   reports that he has quit smoking. He has never used smokeless tobacco. He reports that he does not drink alcohol and does not use drugs. Family History:   Family History   Problem Relation Age of Onset    No Known Problems Mother     No Known Problems Father        Review of Systems:     Review of Systems   Constitutional: Positive for fatigue (Exercise capacity diminished). Respiratory: Negative for cough and shortness of breath.     Cardiovascular: Negative bleed         Current Meds:   Scheduled Meds:    Hydrocerin   Topical BID    amitriptyline  10 mg Oral Nightly    allopurinol  100 mg Oral Daily    carbidopa-levodopa  1 tablet Oral 4x Daily    clopidogrel  75 mg Oral Daily    entacapone  200 mg Oral 4x Daily    pantoprazole  40 mg Oral QAM AC    gabapentin  100 mg Oral TID    isosorbide mononitrate  30 mg Oral Daily    magnesium oxide  400 mg Oral Daily    metoprolol tartrate  12.5 mg Oral BID    rOPINIRole  0.25 mg Oral TID    sodium chloride flush  5-40 mL IntraVENous 2 times per day    enoxaparin  40 mg SubCUTAneous Daily    atorvastatin  40 mg Oral Nightly     Continuous Infusions:    sodium chloride       PRN Meds: sodium chloride flush, sodium chloride, ondansetron **OR** ondansetron, acetaminophen **OR** acetaminophen, magnesium hydroxide, potassium chloride **OR** potassium alternative oral replacement **OR** potassium chloride, potassium chloride, magnesium sulfate, nitroGLYCERIN    Data:     I/O (24Hr):     Intake/Output Summary (Last 24 hours) at 6/19/2022 1613  Last data filed at 6/19/2022 1114  Gross per 24 hour   Intake --   Output 1350 ml   Net -1350 ml       Labs:  Hematology:  Recent Labs     06/18/22  0601   WBC 5.8   RBC 4.09*   HGB 13.2   HCT 40.4*   MCV 98.8   MCH 32.3   MCHC 32.7   RDW 14.5*      MPV 9.7     Chemistry:  Recent Labs     06/17/22  2307 06/18/22  0601    136   K 4.7 4.4    102   CO2 21 22   GLUCOSE 145* 149*   BUN 16 17   CREATININE 1.23* 1.33*   MG 2.1 2.0   ANIONGAP 14 12   LABGLOM 56* 51*   GFRAA >60 >60   CALCIUM 8.9 9.0     Recent Labs     06/18/22  0601   PROT 6.1*   LABALBU 3.2*   AST 23   ALT 35   ALKPHOS 144*   BILITOT 0.34   CHOL 100   HDL 34*   LDLCHOLESTEROL 47   CHOLHDLRATIO 2.9   TRIG 97     ABG:No results found for: POCPH, PHART, PH, POCPCO2, HFM3SAG, PCO2, POCPO2, PO2ART, PO2, POCHCO3, VNJ7GNL, HCO3, NBEA, PBEA, BEART, BE, THGBART, THB, AAT9EFG, TOGV3VDK, I9VAMVHG, O2SAT, FIO2  Lab Results   Component Value Date/Time    SPECIAL RFA 16ML 06/07/2022 10:46 AM     Lab Results   Component Value Date/Time    CULTURE NO GROWTH 5 DAYS 06/07/2022 10:46 AM       Radiology:  No results found. Assessment:        Primary Problem  NSTEMI (non-ST elevated myocardial infarction) [facility])    Active Hospital Problems    Diagnosis Date Noted    Syncope : posterior circulation stroke vs Neurocardiogenic syncope  [R55] 07/09/2013     Priority: High    History of subdural hematoma [Z86.79] 06/18/2022     Priority: Medium    NSTEMI (non-ST elevated myocardial infarction) ([facility] [address] [I21.4] 06/17/2022     Priority: Medium    Gout [M10.9] 06/07/2022     Priority: Medium    Chronic pain of multiple joints [M25.50, G89.29] 05/19/2022     Priority: Medium    Chronic renal disease, stage III ([facility] [address] [255448] [N18.30] 04/20/2022     Priority: Medium    Overweight (BMI 25.0-29. 9) [E66.3] 08/16/2021    Frequent falls [R29.6]     General weakness [R53.1] 08/13/2021    Parkinson disease ([facility] [address] Terry  01/16/2020    Pulmonary hypertension, mild (HCC) [I27.20] 08/31/2017    Essential hypertension [I10] 03/23/2015         Plan:        The patient has been readmitted for placement  PT/OT  Fall precautions  Neurology follow-up Dr. Calvo Loop bun and creatinine   Renal follow-up Dr. Michel Tiwari  Avoid nephrotoxins  Pain management  Cardiology eval & f/u as scheduled   Blood Pressure - Monitor and control   DVT prophylaxis  Discharge planning  Will discharge when arrangements complete and ok with other services.   Follow-up with PCP in one week, JW Walker - CNP  Notify PCP of discharge     IP CONSULT TO HOSPITALIST  IP CONSULT TO SOCIAL WORK  IP CONSULT TO Jaron  IP CONSULT TO [address] [place] [address]   6/19/2022  4:13 PM

## 2022-06-20 VITALS
HEART RATE: 56 BPM | DIASTOLIC BLOOD PRESSURE: 63 MMHG | WEIGHT: 184.08 LBS | TEMPERATURE: 98 F | RESPIRATION RATE: 17 BRPM | BODY MASS INDEX: 28.83 KG/M2 | OXYGEN SATURATION: 98 % | SYSTOLIC BLOOD PRESSURE: 108 MMHG

## 2022-06-20 LAB
ALBUMIN SERPL-MCNC: 3.4 G/DL (ref 3.5–5.2)
ANION GAP SERPL CALCULATED.3IONS-SCNC: 10 MMOL/L (ref 9–17)
BUN BLDV-MCNC: 17 MG/DL (ref 8–23)
CALCIUM SERPL-MCNC: 8.8 MG/DL (ref 8.6–10.4)
CHLORIDE BLD-SCNC: 103 MMOL/L (ref 98–107)
CO2: 23 MMOL/L (ref 20–31)
CREAT SERPL-MCNC: 1.37 MG/DL (ref 0.7–1.2)
GFR AFRICAN AMERICAN: >60 ML/MIN
GFR NON-AFRICAN AMERICAN: 50 ML/MIN
GFR SERPL CREATININE-BSD FRML MDRD: ABNORMAL ML/MIN/{1.73_M2}
GLUCOSE BLD-MCNC: 132 MG/DL (ref 70–99)
PHOSPHORUS: 3.6 MG/DL (ref 2.5–4.5)
POTASSIUM SERPL-SCNC: 4.1 MMOL/L (ref 3.7–5.3)
SARS-COV-2, RAPID: NOT DETECTED
SODIUM BLD-SCNC: 136 MMOL/L (ref 135–144)
SPECIMEN DESCRIPTION: NORMAL

## 2022-06-20 PROCEDURE — G0378 HOSPITAL OBSERVATION PER HR: HCPCS

## 2022-06-20 PROCEDURE — 99217 PR OBSERVATION CARE DISCHARGE MANAGEMENT: CPT | Performed by: INTERNAL MEDICINE

## 2022-06-20 PROCEDURE — 87635 SARS-COV-2 COVID-19 AMP PRB: CPT

## 2022-06-20 PROCEDURE — 6370000000 HC RX 637 (ALT 250 FOR IP): Performed by: NURSE PRACTITIONER

## 2022-06-20 PROCEDURE — 97110 THERAPEUTIC EXERCISES: CPT

## 2022-06-20 PROCEDURE — 36415 COLL VENOUS BLD VENIPUNCTURE: CPT

## 2022-06-20 PROCEDURE — 80069 RENAL FUNCTION PANEL: CPT

## 2022-06-20 PROCEDURE — 97535 SELF CARE MNGMENT TRAINING: CPT

## 2022-06-20 PROCEDURE — 97116 GAIT TRAINING THERAPY: CPT

## 2022-06-20 PROCEDURE — 97166 OT EVAL MOD COMPLEX 45 MIN: CPT

## 2022-06-20 RX ORDER — MORPHINE SULFATE 2 MG/ML
2 INJECTION, SOLUTION INTRAMUSCULAR; INTRAVENOUS EVERY 4 HOURS PRN
Status: DISCONTINUED | OUTPATIENT
Start: 2022-06-20 | End: 2022-06-20 | Stop reason: HOSPADM

## 2022-06-20 RX ORDER — ISOSORBIDE MONONITRATE 30 MG/1
30 TABLET, EXTENDED RELEASE ORAL DAILY
Qty: 30 TABLET | Refills: 3 | DISCHARGE
Start: 2022-06-21 | End: 2022-08-23

## 2022-06-20 RX ORDER — ENTACAPONE 200 MG/1
TABLET ORAL
Qty: 90 TABLET | Refills: 3 | OUTPATIENT
Start: 2022-06-20

## 2022-06-20 RX ADMIN — CARBIDOPA AND LEVODOPA 1 TABLET: 25; 100 TABLET ORAL at 16:33

## 2022-06-20 RX ADMIN — ACETAMINOPHEN 650 MG: 325 TABLET ORAL at 09:49

## 2022-06-20 RX ADMIN — PANTOPRAZOLE SODIUM 40 MG: 40 TABLET, DELAYED RELEASE ORAL at 09:44

## 2022-06-20 RX ADMIN — ENTACAPONE 200 MG: 200 TABLET, FILM COATED ORAL at 16:33

## 2022-06-20 RX ADMIN — ENTACAPONE 200 MG: 200 TABLET, FILM COATED ORAL at 13:41

## 2022-06-20 RX ADMIN — CLOPIDOGREL 75 MG: 75 TABLET, FILM COATED ORAL at 09:44

## 2022-06-20 RX ADMIN — ROPINIROLE HYDROCHLORIDE 0.25 MG: 0.25 TABLET, FILM COATED ORAL at 09:45

## 2022-06-20 RX ADMIN — ALLOPURINOL 100 MG: 100 TABLET ORAL at 09:43

## 2022-06-20 RX ADMIN — GABAPENTIN 100 MG: 100 CAPSULE ORAL at 09:43

## 2022-06-20 RX ADMIN — MAGNESIUM GLUCONATE 500 MG ORAL TABLET 400 MG: 500 TABLET ORAL at 09:46

## 2022-06-20 RX ADMIN — ENTACAPONE 200 MG: 200 TABLET, FILM COATED ORAL at 09:42

## 2022-06-20 RX ADMIN — GABAPENTIN 100 MG: 100 CAPSULE ORAL at 13:41

## 2022-06-20 RX ADMIN — ROPINIROLE HYDROCHLORIDE 0.25 MG: 0.25 TABLET, FILM COATED ORAL at 13:42

## 2022-06-20 RX ADMIN — CARBIDOPA AND LEVODOPA 1 TABLET: 25; 100 TABLET ORAL at 09:43

## 2022-06-20 RX ADMIN — ISOSORBIDE MONONITRATE 30 MG: 30 TABLET ORAL at 09:46

## 2022-06-20 RX ADMIN — CARBIDOPA AND LEVODOPA 1 TABLET: 25; 100 TABLET ORAL at 13:41

## 2022-06-20 ASSESSMENT — ENCOUNTER SYMPTOMS
ABDOMINAL PAIN: 0
SHORTNESS OF BREATH: 0
COUGH: 0
NAUSEA: 0
VOMITING: 0

## 2022-06-20 ASSESSMENT — PAIN SCALES - GENERAL: PAINLEVEL_OUTOF10: 8

## 2022-06-20 NOTE — CARE COORDINATION
Voicemail for admissions at [facility] requesting return call    [phone] spoke to Raheem Long at [facility]. Insurance denied SNF. Notified pt and pt's son, Sheila Cedeño. They will call insurance as they do not feel pt is safe to return home    1511 notified by Raheem [place] that she received insurance approval. Transport request faxed to [address] [place] [address] for 6pm. Awaiting rapid covid swab. Pt and son's wife notified    1600 pt scheduled for transportation at 7pm. Pt notified and agreeable with plan. Sneha notified. She printed AVS. HENS completed. Voicemail left for Sheila Cedeño requesting return call    1700 spoke to Sheila Cedeño. He will  the pt at 7pm to take him to [facility]. PS sent to Dr Nica Cesar to notify. Notified Raheem Long.  [facility]    Discharge [facility] Case Management Department  Written by: Mike Angelo RN    Patient Name: Rupa Bridges  Attending Provider: Milka Nguyen DO  Admit Date: 2022 11:11 PM  MRN: [id number]  Account: [de-identified]                     : 1940  Discharge Date: 2022      Disposition: SNF    Mike Angelo RN

## 2022-06-20 NOTE — DISCHARGE INSTR - COC
Continuity of Care Form    Patient Name: Farhana Merino   :  1940  MRN:  [id number]    [facility] date:  2022  Discharge date:  2022    Code Status Order: Full Code   Advance Directives:      Admitting Physician:  Lucia Fung DO  PCP: Anju Spaulding, JW - CNP    Discharging Nurse: [facility] Unit/Room#: 01  Discharging Unit Phone Number: [phone]    Emergency Contact:   Extended Emergency Contact Information  Primary Emergency Contact: Juan Fabian  Address: [address] Phone: [phone]  Relation: Child  Secondary Emergency Contact: Donna Oh  Address: [address] Phone: [phone]  Mobile Phone: [phone]  Relation: Other    Past Surgical History:  Past Surgical History:   Procedure Laterality Date    COLONOSCOPY  2012    poor prep, tubular adenoma    COLONOSCOPY  2017    diverticulosis, multiple polyps as described, spot marking done, pathology-tubular adenoma x 4    MS COLSC FLX W/RMVL OF TUMOR POLYP LESION SNARE TQ N/A 2017    COLONOSCOPY POLYPECTOMY SNARE/COLD BIOPSY with tatooing and apc transverse colon performed by Alee Bower MD at [place] [address] Right     rotator cuff       Immunization History:   Immunization History   Administered Date(s) Administered    COVID-19, Pfizer Purple top, DILUTE for use, 12+ yrs, 30mcg/0.3mL dose 2021, 2021, 2021    Influenza 2012    Influenza, High Dose (Fluzone 65 yrs and older) 2014, 2016, 2017, 2017    Influenza, Quadv, adjuvanted, 65 yrs +, IM, PF (Fluad) 10/19/2020    Influenza, Triv, inactivated, subunit, adjuvanted, IM (Fluad 65 yrs and older) 2018, 10/06/2019    Pneumococcal Conjugate 13-valent (Saini Darcie) 2017, 2018, 2018    Pneumococcal Polysaccharide ([id number]) 2012, 2015       Active Problems:  Patient Active Problem List   Diagnosis Code    Temporary loss of eyesight H53.129    Impaired renal function N28.9    Syncope : posterior circulation stroke vs Neurocardiogenic syncope  R55    Intracranial bleed : traumatic left sub-dural hematoma ,managed conservatively in 2011 I62.9    Headache R51.9    CKD (chronic kidney disease) stage 3, GFR 30-59 ml/min ([facility]) N18.30    Depression F32. A    Hyperlipidemia E78.5    Microhematuria R31.29    Microalbuminuria R80.9    Essential hypertension I10    Generalized abdominal pain R10.84    Tubular adenoma of colon D12.6    Diverticulosis of colon K57.30    History of skull fracture Z87.81    Nausea R11.0    Pure hypercholesterolemia E78.00    Prediabetes R73.03    Parkinson disease ([facility] G20    Chronic post-traumatic headache, not intractable G44.329    Fall (on) (from) other stairs and steps, initial encounter W10. 8XXA    Strain of iliopsoas muscle, initial encounter S76.919A    Chronic pain of left knee M25.562, G89.29    Closed fracture of second toe of right foot S92.501A    Closed fracture of second toe of left foot S92.502A    Abnormal ECG R94.31    Cerebrovascular accident [facility]) I63.9    Chest pain, unspecified R07.9    Hematoma of scalp S00. 03XA    Left ventricular hypertrophy I51.7    Mild aortic valve regurgitation I35.1    Pulmonary hypertension, mild ([facility]) I27.20    Lumbar radiculopathy M54.16    Dizziness R42    Hyponatremia E87.1    Vomiting R11.10    General weakness R53.1    Overweight (BMI 25.0-29. 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    Chronic renal disease, stage III [facility]) [[id number]] N18.30    Chronic pain of multiple joints M25.50, G89.29    Multiple comorbid conditions R69    Gout M10.9    Nerve pain M79.2    NSTEMI (non-ST elevated myocardial infarction) ([facility]) I21.4    History of subdural hematoma Z86.79    Coronary artery disease involving native heart with angina pectoris ([facility] I25.119       Isolation/Infection:   Isolation            Contact          Patient Infection Status       Infection Onset Added Last Indicated Last Indicated By Review Planned Expiration Resolved Resolved By    MRSA  09/25/15 09/25/15 Leverette Mcburney, RN        Leg 9/23/15    Resolved    COVID-19 (Rule Out) 09/01/21 09/01/21 09/01/21 COVID-19, Rapid (Ordered)   09/01/21 Rule-Out Test Resulted            Nurse Assessment:  Last Vital Signs: /63   Pulse 56   Temp 98 °F (36.7 °C) (Oral)   Resp 17   Wt 184 lb 1.4 oz (83.5 kg)   SpO2 98%   BMI 28.83 kg/m²     Last documented pain score (0-10 scale): Pain Level: 8  Last Weight:   Wt Readings from Last 1 Encounters:   06/18/22 184 lb 1.4 oz (83.5 kg)     Mental Status:  oriented and alert    IV Access:  - None    Nursing Mobility/ADLs:  Walking   Assisted  Transfer  Assisted  Bathing  Assisted  Dressing  Assisted  Toileting  Assisted  Feeding  Assisted  Med Admin  Assisted  Med Delivery   whole    Wound Care Documentation and Therapy:        Elimination:  Continence: Bowel: Yes  Bladder: Yes  Urinary Catheter: None   Colostomy/Ileostomy/Ileal Conduit: No       Date of Last BM: 6/20/2022    Intake/Output Summary (Last 24 hours) at 6/20/2022 1711  Last data filed at 6/20/2022 1024  Gross per 24 hour   Intake --   Output 2485 ml   Net -2485 ml     I/O last 3 completed shifts:  In: -   Out: 3977 [Urine:3335]    Safety Concerns:     None    Impairments/Disabilities:      None    Nutrition Therapy:  Current Nutrition Therapy:   - Oral Diet:  General    Routes of Feeding: Oral  Liquids: Thin Liquids  Daily Fluid Restriction: no  Last Modified Barium Swallow with Video (Video Swallowing Test): not done    Treatments at the Time of Hospital Discharge:   Respiratory Treatments:   Oxygen Therapy:  is not on home oxygen therapy.   Ventilator:    - No ventilator support    Rehab Therapies: Physical Therapy  Weight Bearing Status/Restrictions: No weight bearing restrictions  Other Medical Equipment (for information only, NOT a DME order):  walker  Other Treatments:     Patient's personal belongings (please select all that are sent with patient):  Glasses    RN SIGNATURE:  Electronically signed by Mari Osborne RN on 6/20/22 at 3:35 PM EDT    CASE MANAGEMENT/SOCIAL WORK SECTION    Inpatient Status Date:     Readmission Risk Assessment Score:  Readmission Risk              Risk of Unplanned Readmission:  0           Discharging to Facility/ Agency   Name: [facility]  Address:   [address], [address] [place] [address]         Phone: [phone]        Phone:  Fax:    Dialysis Facility (if applicable)   Name:  Address:  Dialysis Schedule:  Phone:  Fax:    / signature: Electronically signed by Lizette Sow RN on 6/20/22 at 3:55 PM EDT    PHYSICIAN SECTION    Prognosis: Fair    Condition at Discharge: Stable    Rehab Potential (if transferring to Rehab): Fair    Recommended Labs or Other Treatments After Discharge:   PT/OT  Fall precautions  Neurology follow-up Dr. Raman Mcallister: Parkinson's  Check bun and creatinine   Renal follow-up Dr. Maggie Castro  Avoid nephrotoxins  Pain management  Cardiology eval & f/u as scheduled TCC two weeks  Blood Pressure - Monitor and control   Follow-up with PCP in one week, JW Real - CNP    Physician Certification: I certify the above information and transfer of Pop Zurita  is necessary for the continuing treatment of the diagnosis listed and that he requires [facility] for less 30 days.      Update Admission H&P:   Principal Problem:    NSTEMI (non-ST elevated myocardial infarction) [facility])  Active Problems:    Syncope : posterior circulation stroke vs Neurocardiogenic syncope     Chronic renal disease, stage III (HCC) [902359]    Chronic pain of multiple joints    Gout    History of subdural hematoma    Essential hypertension    Parkinson disease ([address]    Pulmonary hypertension, mild ([address] General weakness    Overweight (BMI 25.0-29. 9)    Frequent falls  Resolved Problems:    * No resolved hospital problems.  *     PHYSICIAN SIGNATURE:  Electronically signed by Whitney Mejias DO on 6/20/22 at 11:19 AM EDT

## 2022-06-20 NOTE — PROGRESS NOTES
[facility]  Office: [address], DO, Lorkendra Din, DO, Miguel Acron, DO, Annabella Flavin Blood, DO, Justin Groves MD, Keven Tavarez MD, Lotus Ivory MD, Thao Guevara MD, Dewayne Burns MD, Isael Garcia MD, Melanie Ahmadi MD, Roberto Daniels, DO, Mya Whitfield MD,  Judy Lou MD, Angle Krause MD, Ashley Starr, DO, David Araujo MD, Veronique Lackey MD, Lyndon Talley DO, Diego Mejias MD, Uziel Stauffer MD, Mai Navarro CNP, [facility], CNP, Audrey Gorman CNP, Adelia Alpers, [facility], Alyx Pena CNP, Natasha Pearl CNP, AD FernandezC, Isatu Matias DNP, Capo Jean, [facility], Gwen Abebe, CNP, Cindy Lund, CNP, Melissa Wright, CNS, Rodriguez Elkins [facility], Jerrod Irby CNP, Fred Sandoval, [facility], Alexander Nov, [address] [place] [address]    Progress Note    6/20/2022    11:20 AM    Name:   Lisa Lai  MRN:     [id number]     Acct:      [de-identified]   Room:   2022/2022-01  IP Day:  0  Admit Date:  6/16/2022 11:11 PM    PCP:   JW Gold CNP  Code Status:  Full Code    Subjective:     C/C:   Return to the hospital from his skilled nursing facility due to insurance issues    Interval History Status:   Improved  Lying flat  Comfortable  No distress    Data Base Updates:  BP has been low at times    The patient has been in negative fluid balance    [id number] High mg/dL     DQS11pd/dL   CREATININE1.37 High mg/dL     Calcium8.8mg/dL   Albumin3. 4 Low g/dL   Phosphorus3.6mg/dL   Gaccwk877jlhx/L   Potassium4.1           Brief History:     As documented in the medical record:  Terry Rutledge is a 79 y.o.  male with history of parkinson's with frequent falls, chronic pain/DJD/OA, ckd 3, htn, hlp, cad s/p prior stent to lad recently admitted after nstemi s/p cath with patent stent lad, nonobstructive cad, continued on medical management and was recommended for rehab with recurrent falls/ progressive weakness. Unfortunately, patient arrived at rehab earlier last night and was sent back secondary to insurance issues. \"    The intitial assessment and plan included:  \"  Hospital Problems            Last Modified POA     * (Principal) NSTEMI (non-ST elevated myocardial infarction) ([facility] 6/17/2022 Yes     Syncope : posterior circulation stroke vs Neurocardiogenic syncope  6/17/2022 Yes     General weakness 6/17/2022 Yes     Chronic renal disease, stage III [facility]) [581940] 6/17/2022 Yes     Chronic pain of multiple joints (Chronic) 6/17/2022 Yes     Gout 6/17/2022 Yes     Frequent falls 6/17/2022 Yes          1. Admit for observation for reevaluation for placement status post readmission, discharged yesterday to skilled facility however issues with insurance and returned back to the hospital.  Resume therapy, consult social work for assistance,   2. Continue Tylenol for pain control. 3. Continue cardiac regimen as previously recommended  4. Continue Parkinson's medications\"     Cath on 6/8/2022 showed single vessel CAD with patent stent in LAD    The patient's renal function was carefully monitored  Results for Marco Calderon (MRN [id number]) as of 6/20/2022 11:07   Ref. Range 6/10/2022 10:15 6/15/2022 08:58 6/17/2022 23:07 6/18/2022 06:01 6/20/2022 05:42   Creatinine Latest Ref Range: 0.70 - 1.20 mg/dL 1.37 (H) 1.27 (H) 1.23 (H) 1.33 (H) 1.37 (H)       Discharge planning initiated    Past Medical History:   has a past medical history of 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 ([facility] CKD (chronic kidney disease) stage 2, GFR 60-89 ml/min, CKD (chronic kidney disease) stage 3, GFR 30-59 ml/min ([facility]), Diverticulosis of colon, GERD (gastroesophageal reflux disease), Impaired renal function, MRSA (methicillin resistant staph aureus) culture positive, Osteoarthritis of knee, Parkinson disease ([facility] Proteinuria, Skull fracture ([facility] Temporary loss of eyesight, and Tubular adenoma of colon.     Social History: reports that he has quit smoking. He has never used smokeless tobacco. He reports that he does not drink alcohol and does not use drugs. Family History:   Family History   Problem Relation Age of Onset    No Known Problems Mother     No Known Problems Father        Review of Systems:     Review of Systems   Constitutional: Positive for fatigue (Exercise capacity diminished). Respiratory: Negative for cough and shortness of breath. Cardiovascular: Positive for chest pain (The patient reported he did have transient left anterior chest ache last night, nonexertional). Negative for palpitations. Gastrointestinal: Negative for abdominal pain, nausea and vomiting. Genitourinary: Negative for flank pain and hematuria. Musculoskeletal: Positive for arthralgias, gait problem (Gait unsteady, history of falls) and myalgias. The patient has chronic arthralgias and myalgias  His arms have been troublesome, he attributes his pain due to tremors. .. Neurological: Positive for tremors, syncope (No further episodes) and weakness (Generalized). Negative for light-headedness. Physical Examination:        Vitals  BP (!) 84/49   Pulse 53   Temp 97.9 °F (36.6 °C) (Oral)   Resp 16   Wt 184 lb 1.4 oz (83.5 kg)   SpO2 96%   BMI 28.83 kg/m²   Temp (24hrs), Av.9 °F (36.6 °C), Min:97.4 °F (36.3 °C), Max:98.7 °F (37.1 °C)    No results for input(s): POCGLU in the last 72 hours. Physical Exam  Vitals reviewed. Constitutional:       General: He is not in acute distress. Appearance: He is not diaphoretic. HENT:      Head: Normocephalic. Eyes:      General: No scleral icterus. Conjunctiva/sclera: Conjunctivae normal.   Neck:      Trachea: No tracheal deviation. Cardiovascular:      Rate and Rhythm: Normal rate and regular rhythm. Pulmonary:      Effort: Pulmonary effort is normal. No respiratory distress. Breath sounds: Normal breath sounds. No wheezing or rales.    Chest: Chest wall: No tenderness. Abdominal:      General: There is no distension. Palpations: Abdomen is soft. Tenderness: There is no abdominal tenderness. Musculoskeletal:         General: No tenderness. Cervical back: Neck supple. Skin:     General: Skin is warm and dry. Findings: No rash. Neurological:      Mental Status: He is alert and oriented to person, place, and time. Comments: Resting tremor  Bradykinesia  Masked facies         Medications: Allergies: Allergies   Allergen Reactions    Dye [Iodides]      pain    Aspirin      Brain bleed         Current Meds:   Scheduled Meds:    Hydrocerin   Topical BID    amitriptyline  10 mg Oral Nightly    allopurinol  100 mg Oral Daily    carbidopa-levodopa  1 tablet Oral 4x Daily    clopidogrel  75 mg Oral Daily    entacapone  200 mg Oral 4x Daily    pantoprazole  40 mg Oral QAM AC    gabapentin  100 mg Oral TID    isosorbide mononitrate  30 mg Oral Daily    magnesium oxide  400 mg Oral Daily    metoprolol tartrate  12.5 mg Oral BID    rOPINIRole  0.25 mg Oral TID    sodium chloride flush  5-40 mL IntraVENous 2 times per day    enoxaparin  40 mg SubCUTAneous Daily    atorvastatin  40 mg Oral Nightly     Continuous Infusions:    sodium chloride       PRN Meds: morphine, sodium chloride flush, sodium chloride, ondansetron **OR** ondansetron, acetaminophen **OR** acetaminophen, magnesium hydroxide, potassium chloride **OR** potassium alternative oral replacement **OR** potassium chloride, potassium chloride, magnesium sulfate, nitroGLYCERIN    Data:     I/O (24Hr):     Intake/Output Summary (Last 24 hours) at 6/20/2022 1120  Last data filed at 6/20/2022 1024  Gross per 24 hour   Intake --   Output 3085 ml   Net -3085 ml       Labs:  Hematology:  Recent Labs     06/18/22  0601   WBC 5.8   RBC 4.09*   HGB 13.2   HCT 40.4*   MCV 98.8   MCH 32.3   MCHC 32.7   RDW 14.5*      MPV 9.7     Chemistry:  Recent Labs 06/17/22  2307 06/18/22  0601 06/20/22  0542    136 136   K 4.7 4.4 4.1    102 103   CO2 21 22 23   GLUCOSE 145* 149* 132*   BUN 16 17 17   CREATININE 1.23* 1.33* 1.37*   MG 2.1 2.0  --    ANIONGAP 14 12 10   LABGLOM 56* 51* 50*   GFRAA >60 >60 >60   CALCIUM 8.9 9.0 8.8   PHOS  --   --  3.6     Recent Labs     06/18/22  0601 06/20/22  0542   PROT 6.1*  --    LABALBU 3.2* 3.4*   AST 23  --    ALT 35  --    ALKPHOS 144*  --    BILITOT 0.34  --    CHOL 100  --    HDL 34*  --    LDLCHOLESTEROL 47  --    CHOLHDLRATIO 2.9  --    TRIG 97  --      ABG:No results found for: POCPH, PHART, PH, POCPCO2, FRT9OAS, PCO2, POCPO2, PO2ART, PO2, POCHCO3, MER8JQR, HCO3, NBEA, PBEA, BEART, BE, THGBART, THB, SCD5EGO, YRNL2SQP, C9MZZHPE, O2SAT, FIO2  Lab Results   Component Value Date/Time    SPECIAL RFA 16ML 06/07/2022 10:46 AM     Lab Results   Component Value Date/Time    CULTURE NO GROWTH 5 DAYS 06/07/2022 10:46 AM       Radiology:  No results found. Assessment:        Primary Problem  NSTEMI (non-ST elevated myocardial infarction) [facility])    Active Hospital Problems    Diagnosis Date Noted    Syncope : posterior circulation stroke vs Neurocardiogenic syncope  [R55] 07/09/2013     Priority: High    History of subdural hematoma [Z86.79] 06/18/2022     Priority: Medium    NSTEMI (non-ST elevated myocardial infarction) ([facility] [I21.4] 06/17/2022     Priority: Medium    Gout [M10.9] 06/07/2022     Priority: Medium    Chronic pain of multiple joints [M25.50, G89.29] 05/19/2022     Priority: Medium    [facility] disease, stage [facility] [799953] [N18.30] 04/20/2022     Priority: Medium    Overweight (BMI 25.0-29. 9) [E66.3] 08/16/2021    Frequent falls [R29.6]     General weakness [R53.1] 08/13/2021    Parkinson disease ([facility] Doris Bear 01/16/2020    Pulmonary hypertension, mild ([facility] [I27.20] 08/31/2017    Essential hypertension [I10] 03/23/2015         Plan:        The patient has been readmitted for placement due to insurance issues  PT/OT  Fall precautions  Neurology follow-up Dr. Sixto Crowell: Parkinson's  Check bun and creatinine   Renal follow-up Dr. Alejandro Palomino  Avoid nephrotoxins  Pain management  Cardiology eval & f/u as scheduled TCC two weeks  Blood Pressure - Monitor and control   Titrate meds as needed for low blood pressure  DVT prophylaxis  Discharge planning  Will discharge when arrangements complete and ok with other services.   Follow-up with PCP in one week, JW Yeboah - CNP  Notify PCP of discharge   Med rec done  JOVANNA done  DCP 34 min+    IP CONSULT TO HOSPITALIST  IP CONSULT TO SOCIAL WORK  IP CONSULT  S [facility] CONSULT TO [address] [place] [address], DO  6/20/2022  11:20 AM

## 2022-06-20 NOTE — PROGRESS NOTES
Physical Therapy  Facility/Department: [facility] CAR 2  Daily Treatment Note    Name: Silas Vides  : 1940  MRN: [id number]  Date of Service: 2022    Discharge Recommendations:  Patient would benefit from continued therapy after discharge          Patient Diagnosis(es): The encounter diagnosis was Chest pain, unspecified type. 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 ([facility] [address] CKD (chronic kidney disease) stage 2, GFR 60-89 ml/min, CKD (chronic kidney disease) stage 3, GFR 30-59 ml/min (HCC), Diverticulosis of colon, GERD (gastroesophageal reflux disease), Impaired renal function, MRSA (methicillin resistant staph aureus) culture positive, Osteoarthritis of knee, Parkinson disease ([facility] [address] Proteinuria, Skull fracture ([facility] [address] Temporary loss of eyesight, and Tubular adenoma of colon. Past Surgical History:  has a past surgical history that includes Colonoscopy (2012); shoulder surgery (Right); pr colsc flx w/rmvl of tumor polyp lesion snare tq (N/A, 2017); and Colonoscopy (2017). Assessment   Body Structures, Functions, Activity Limitations Requiring Skilled Therapeutic Intervention: Decreased functional mobility ; Decreased strength;Decreased endurance;Decreased balance; Increased pain;Decreased ADL status; Decreased high-level IADLs  Assessment: Pt ambulates 5 ft RW CGA. Pt currently is a high fall risk d/t decreased balance and endurance and is expected to require 24 hr support for functional mobility at d/c. pt would benefit from continued therapy to promote endurance, balance, and strengthening. Therapy Prognosis: Good  Barriers to Learning: none  Activity Tolerance  Activity Tolerance: Patient limited by endurance; Patient tolerated treatment well;Patient limited by pain  Activity Tolerance Comments: Pt feeling slightly lightheaded today; per pt, RN gave patient pain meds for headache     Plan   Plan  Plan:  (5-6x/wk)  Current Treatment Recommendations: Strengthening,Balance training,Functional mobility training,Transfer training,Gait training,Stair training,Patient/Caregiver education & training,Safety education & training,Home exercise program,Equipment evaluation, education, & procurement,Therapeutic activities,Endurance training  Safety Devices  Type of Devices: Gait belt,Call light within reach,Nurse notified,Left in chair,All fall risk precautions in place,Chair alarm in place  Restraints  Restraints Initially in Place: No     Restrictions  Restrictions/Precautions  Restrictions/Precautions: Up as Tolerated,Fall Risk,Contact Precautions  Required Braces or Orthoses?: No     Subjective   General  Chart Reviewed: Yes  Response To Previous Treatment: Patient with no complaints from previous session. Family / Caregiver Present: No  General Comment  Comments: Pt retired to recliner with B LEs elevated with call light and chair alarm. RN notified. Subjective  Subjective: Pt supine upon arrival.  RN and pt agreeable to PT. Pt pleasant and cooperative, pt c/o L groin pain with movement and a headache; per pt, RN just gave him pain meds              Cognition   Orientation  Overall Orientation Status: Within Functional Limits  Cognition  Overall Cognitive Status: Exceptions  Arousal/Alertness: Appropriate responses to stimuli  Following Commands: Follows multistep commands with repitition; Follows multistep commands with increased time  Attention Span: Attends with cues to redirect  Initiation: Requires cues for some  Sequencing: Requires cues for some     Objective   Bed mobility  Rolling to Right: Contact guard assistance  Supine to Sit: Minimal assistance  Sit to Supine: Unable to assess (pt retired to recliner)  Bed Mobility Comments: HOB elevated.  Increased time and effort  Transfers  Sit to Stand: Contact guard assistance  Stand to sit: Contact guard assistance  Comment: RW for UE support, cues for hand placement  Ambulation  Surface: level tile  Device: Rolling Walker  Other Apparatus: O2  Assistance: Contact guard assistance  Quality of Gait: mildly unsteady, increased SHABBIR, B knee flexed. Gait Deviations: Slow Ewa; Increased SHABBIR; Decreased step length  Distance: 5 ft, seated rest period, 5 ft  Comments: No LOB noted. Balance  Posture: Fair  Sitting - Static: Good  Sitting - Dynamic: Good;-  Standing - Static: Fair  Standing - Dynamic: Fair  Comments: assessed with RW  Exercise Treatment: Supine Exercises: Ankle Pumps, Gluteal sets, Heel Slides, Hip ABD/ADD, Quad Sets, Reps:  x 10 reps with tactile and verbal cues for technique. AAROM to L hip abd/add and heel slides d/t L hip groin pain. AROM R LE        OutComes Score   AM-PAC Score  AM-PAC Inpatient Mobility Raw Score : 17 (06/20/22 1116)  AM-PAC Inpatient T-Scale Score : 42.13 (06/20/22 1116)  Mobility Inpatient CMS 0-100% Score: 50.57 (06/20/22 1116)  Mobility Inpatient CMS G-Code Modifier : CK (06/20/22 1116)          Goals  Short Term Goals  Time Frame for Short term goals: 14 visits  Short term goal 1: Complete transfers with least restrictive AD and mod I  Short term goal 2: Complete 300 ft of gait with least restrictive AD and mod I  Short term goal 3: Complete 5 steps with RHR and mod I  Short term goal 4: Participate in 30 minutes of therapy to promote endurance       Education  Patient Education  Education Given To: Patient  Education Provided: Transfer Training;Plan of Care; Fall Prevention Strategies  Education Provided Comments: gait and transfer training  Education Method: Verbal  Barriers to Learning: None  Education Outcome: Verbalized understanding;Continued education needed      Therapy Time   Individual Concurrent Group Co-treatment   Time In 1035         Time Out 1103         Minutes 28         Timed Code Treatment Minutes: Jovanna 6, Shireen Hernandez, ULISES

## 2022-06-20 NOTE — DISCHARGE SUMMARY
[facility]  Office: [phone]  Denise Rizvi, DO, Jose Miguel Blackman, DO, Tobias Dawson, DO, Priscilla White, DO, Joann Conrad MD, Richardson Pastrana MD, Teresa Meek MD, Brandi Jean-Baptiste MD, Nell Tineo MD, Maribel Thomas MD, Jann Stephens MD, Mena Cardona DO, Tanya Johnson MD,  Bruna Carrizales DO, Joseph Pastrana MD, Sherry Cho MD, Imani Matt DO, Alis Vivar MD, Shahnaz Francis MD, Magnolia Galaviz DO, Kirby Ta MD, Lyn Valdes MD, Hanane Turcios, [facility], CNP, Kalli Osorio, CNP, Poncho Govea, CNP, Chinedu Johns, CNP, Epi Brito, CNP, Eusebio Bloom PA-C, Maria Eugenia Medina, [facility], Sindy Brothers, CNP, Giovana Johnson, CNP, Cherie Jung, CNP, Joshua Carroll, CNS, Shelby Allen, [facility], Jakub Schultz, CNP, Sherri Simon, CNP, Yanet Mcneil, [facility]    Discharge Summary    Patient ID: Aj Caba  :  1940   MRN: [id number]     ACCOUNT:  [de-identified]   Patient's PCP: JW Ortez CNP  Admit Date: 2022   Discharge Date:   2022   Discharge Physician: Greg Massey DO     The patient was seen and examined on day of discharge and this discharge summary is in conjunction with any daily progress note from day of discharge.     Active Discharge Diagnoses:     Primary Problem  NSTEMI (non-ST elevated myocardial infarction) [facility])      Hospital Problems  Active Hospital Problems    Diagnosis Date Noted    Syncope : posterior circulation stroke vs Neurocardiogenic syncope  [R55] 2013     Priority: High    History of subdural hematoma [Z86.79] 2022     Priority: Medium    NSTEMI (non-ST elevated myocardial infarction) ([facility] [address] [I21.4] 2022     Priority: Medium    Gout [M10.9] 06/07/2022     Priority: Medium    Chronic pain of multiple joints [M25.50, G89.29] 05/19/2022     Priority: Medium    Chronic renal disease, stage III [facility]) [195205] [N18.30] 04/20/2022     Priority: Medium    Overweight (BMI 25.0-29. 9) [E66.3] 08/16/2021    Frequent falls [R29.6]     General weakness [R53.1] 08/13/2021    Parkinson disease ([facility] [address] Freda Camera 01/16/2020    Pulmonary hypertension, mild ([facility] [address] [I27.20] 08/31/2017    Essential hypertension [I10] 03/23/2015         Hospital Course:     Brief History  As documented in the medical record:  \"Mina Gill is a 79 y. o.  male with history of parkinson's with frequent falls, chronic pain/DJD/OA, ckd 3, htn, hlp, cad s/p prior stent to lad recently admitted after nstemi s/p cath with patent stent lad, nonobstructive cad, continued on medical management and was recommended for rehab with recurrent falls/ progressive weakness. Unfortunately, patient arrived at rehab earlier last night and was sent back secondary to insurance issues. \"     The intitial assessment and plan included:  \"          Hospital Problems            Last Modified POA     * (Principal) NSTEMI (non-ST elevated myocardial infarction) ([facility] [address] 6/17/2022 Yes     Syncope : posterior circulation stroke vs Neurocardiogenic syncope  6/17/2022 Yes     General weakness 6/17/2022 Yes     Chronic renal disease, stage III [facility]) [262368] 6/17/2022 Yes     Chronic pain of multiple joints (Chronic) 6/17/2022 Yes     Gout 6/17/2022 Yes     Frequent falls 6/17/2022 Yes          1. Admit for observation for reevaluation for placement status post readmission, discharged yesterday to skilled facility however issues with insurance and returned back to the hospital.  Resume therapy, consult social work for assistance,   2. Continue Tylenol for pain control.   3. Continue cardiac regimen as previously recommended  4.  Continue Parkinson's medications\"      Cath on 6/8/2022 showed single vessel CAD with patent stent in LAD     The patient's renal function was carefully monitored  Results for Janice Poole (MRN [id number]) as of 6/20/2022 11:07    Ref. Range 6/10/2022 10:15 6/15/2022 08:58 6/17/2022 23:07 6/18/2022 06:01 6/20/2022 05:42   Creatinine Latest Ref Range: 0.70 - 1.20 mg/dL 1.37 (H) 1.27 (H) 1.23 (H) 1.33 (H) 1.37 (H)         Discharge planning initiated     Discharge plan:     The patient has been readmitted for placement due to insurance issues  PT/OT  Fall precautions  Neurology follow-up Dr. Duane Velasco: Parkinson's  Check bun and creatinine   Renal follow-up Dr. Delphine Ceron  Avoid nephrotoxins  Pain management  Cardiology eval & f/u as scheduled [facility] two weeks  Blood Pressure - Monitor and control   Titrate meds as needed for low blood pressure  DVT prophylaxis  Discharge planning  Will discharge when arrangements complete and ok with other services. Follow-up with PCP in one week, JW Abdalla - CNP  Notify PCP of discharge   Med rec done  JOVANNA done  DCP 34 min+    No discharge procedures on file.     Significant Diagnostic Studies:     Labs / Micro:  Labs:  Hematology:  Recent Labs     06/18/22  0601   WBC 5.8   RBC 4.09*   HGB 13.2   HCT 40.4*   MCV 98.8   MCH 32.3   MCHC 32.7   RDW 14.5*      MPV 9.7     Chemistry:  Recent Labs     06/17/22  2307 06/18/22  0601 06/20/22  0542    136 136   K 4.7 4.4 4.1    102 103   CO2 21 22 23   GLUCOSE 145* 149* 132*   BUN 16 17 17   CREATININE 1.23* 1.33* 1.37*   MG 2.1 2.0  --    ANIONGAP 14 12 10   LABGLOM 56* 51* 50*   GFRAA >60 >60 >60   CALCIUM 8.9 9.0 8.8   PHOS  --   --  3.6     Recent Labs     06/18/22  0601 06/20/22  0542   PROT 6.1*  --    LABALBU 3.2* 3.4*   AST 23  --    ALT 35  --    ALKPHOS 144*  --    BILITOT 0.34  --    CHOL 100  --    HDL 34*  --    LDLCHOLESTEROL 47  --    CHOLHDLRATIO 2.9  --    TRIG 97  --        Radiology:    XR CHEST PORTABLE    Result Date: 6/7/2022  EXAMINATION: ONE XRAY VIEW OF THE CHEST 6/7/2022 10:10 am COMPARISON: Chest radiograph performed 09/01/2021. HISTORY: ORDERING SYSTEM PROVIDED HISTORY: CP x1d TECHNOLOGIST PROVIDED HISTORY: CP x1d FINDINGS: There is no acute consolidation or effusion. There is no pneumothorax. The mediastinal structures are unremarkable. The upper abdomen is unremarkable. The extrathoracic soft tissues are unremarkable. There is no acute osseous abnormality. No acute cardiopulmonary process. FLUORO FOR SURGICAL PROCEDURES    Result Date: 6/6/2022  Radiology exam is complete. No Radiologist dictation. Please follow up with ordering provider. Consultations:    Consults:     Final Specialist Recommendations/Findings:   IP CONSULT TO HOSPITALIST  IP CONSULT TO SOCIAL WORK  IP CONSULT TO SPIRITUAL SERVICES  IP CONSULT TO SPIRITUAL SERVICES        Discharged Condition:    Stable     Disposition: skilled nursing facility     Physician Follow Up:   No follow-up provider specified. Activity:  activity as tolerated    Diet:  cardiac diet     Discharge Medications:      Medication List      CONTINUE taking these medications    acetaminophen 650 MG extended release tablet  Commonly known as: Tylenol 8 Hour Arthritis Pain  Take 1 tablet by mouth every 8 hours as needed for Pain     allopurinol 100 MG tablet  Commonly known as: ZYLOPRIM  TAKE 1 TABLET BY MOUTH ONCE DAILY     amitriptyline 10 MG tablet  Commonly known as: ELAVIL  Take 1 tablet by mouth nightly     atorvastatin 40 MG tablet  Commonly known as: LIPITOR  take 1 tablet by mouth once daily     butalbital-acetaminophen-caffeine -40 MG per tablet  Commonly known as: FIORICET, ESGIC  Take 1 tab prn only for severe headache. Can repeat after 4 hrs if needed. Max 2 tabs/24 hrs.  Max 4 tabs/week     carbidopa-levodopa  MG per tablet  Commonly known as: SINEMET  TAKE 1 TABLET BY MOUTH 4 TIMES DAILY     clopidogrel 75 MG tablet  Commonly known as: PLAVIX  TAKE 1 TABLET BY MOUTH ONCE DAILY     diclofenac sodium 1 % Gel  Commonly known as: VOLTAREN  Apply 2 g topically 2 times daily as needed for Pain (joint pain)     entacapone 200 MG tablet  Commonly known as: COMTAN  TAKE ONE (1) TABLET BY MOUTH FOUR (4) TIMES DAILY WITH SINEMET     esomeprazole 20 MG delayed release capsule  Commonly known as: NEXIUM  TAKE 1 CAPSULE BY MOUTH ONCE DAILY IN THE MORNING BEFORE BREAKFAST     gabapentin 100 MG capsule  Commonly known as: NEURONTIN  Take 1 capsule by mouth 3 times daily for 30 days. isosorbide mononitrate 30 MG extended release tablet  Commonly known as: IMDUR  Take 1 tablet by mouth daily  Start taking on: June 21, 2022     magnesium oxide 400 (240 Mg) MG tablet  Commonly known as: MAG-OX  TAKE 1 TABLET BY MOUTH ONCE DAILY     metoprolol tartrate 25 MG tablet  Commonly known as: LOPRESSOR  Take 0.5 tablets by mouth 2 times daily     nitroGLYCERIN 0.4 MG SL tablet  Commonly known as: NITROSTAT  up to max of 3 total doses. If no relief after 1 dose, call 911. rOPINIRole 0.25 MG tablet  Commonly known as: REQUIP  TAKE ONE (1) TABLET BY MOUTH THREE (3) TIMES DAILY        STOP taking these medications    lidocaine 4 % external patch           Where to Get Your Medications      Information about where to get these medications is not yet available    Ask your nurse or doctor about these medications  · isosorbide mononitrate 30 MG extended release tablet  · metoprolol tartrate 25 MG tablet         Time Spent on discharge is  34 mins in patient examination, evaluation, counseling, medication reconciliation, discharge plan and follow up. Electronically signed by   Milka Nguyen DO  6/20/2022  5:11 PM      Thank you Dr. Sabine Krishna, APRN - CNP for the opportunity to be involved in this patient's care.

## 2022-06-21 ENCOUNTER — CARE COORDINATION (OUTPATIENT)
Dept: CARE COORDINATION | Age: 82
End: 2022-06-21

## 2022-06-21 RX ORDER — ENTACAPONE 200 MG/1
TABLET ORAL
Qty: 90 TABLET | Refills: 3 | Status: SHIPPED | OUTPATIENT
Start: 2022-06-21

## 2022-06-24 ENCOUNTER — HOSPITAL ENCOUNTER (OUTPATIENT)
Age: 82
Setting detail: SPECIMEN
Discharge: HOME | End: 2022-06-24

## 2022-06-24 LAB
ANION GAP SERPL CALCULATED.3IONS-SCNC: 15 MMOL/L (ref 9–17)
BUN BLDV-MCNC: 17 MG/DL (ref 8–23)
CALCIUM SERPL-MCNC: 9.5 MG/DL (ref 8.6–10.4)
CHLORIDE BLD-SCNC: 101 MMOL/L (ref 98–107)
CO2: 24 MMOL/L (ref 20–31)
CREAT SERPL-MCNC: 1.4 MG/DL (ref 0.7–1.2)
GFR AFRICAN AMERICAN: 59 ML/MIN
GFR NON-AFRICAN AMERICAN: 49 ML/MIN
GFR SERPL CREATININE-BSD FRML MDRD: ABNORMAL ML/MIN/{1.73_M2}
GLUCOSE BLD-MCNC: 89 MG/DL (ref 70–99)
HCT VFR BLD CALC: 43.1 % (ref 40.7–50.3)
HEMOGLOBIN: 14.5 G/DL (ref 13–17)
MCH RBC QN AUTO: 32.2 PG (ref 25.2–33.5)
MCHC RBC AUTO-ENTMCNC: 33.6 G/DL (ref 28.4–34.8)
MCV RBC AUTO: 95.8 FL (ref 82.6–102.9)
NRBC AUTOMATED: 0 PER 100 WBC
PDW BLD-RTO: 14.3 % (ref 11.8–14.4)
PLATELET # BLD: 223 K/UL (ref 138–453)
PMV BLD AUTO: 9.6 FL (ref 8.1–13.5)
POTASSIUM SERPL-SCNC: 4.5 MMOL/L (ref 3.7–5.3)
RBC # BLD: 4.5 M/UL (ref 4.21–5.77)
SODIUM BLD-SCNC: 140 MMOL/L (ref 135–144)
WBC # BLD: 6.5 K/UL (ref 3.5–11.3)

## 2022-06-24 PROCEDURE — 36415 COLL VENOUS BLD VENIPUNCTURE: CPT

## 2022-06-24 PROCEDURE — 85027 COMPLETE CBC AUTOMATED: CPT

## 2022-06-24 PROCEDURE — 80048 BASIC METABOLIC PNL TOTAL CA: CPT

## 2022-06-24 PROCEDURE — P9603 ONE-WAY ALLOW PRORATED MILES: HCPCS

## 2022-06-26 ENCOUNTER — HOSPITAL ENCOUNTER (EMERGENCY)
Age: 82
Discharge: HOME | End: 2022-06-27
Attending: STUDENT IN AN ORGANIZED HEALTH CARE EDUCATION/TRAINING PROGRAM
Payer: MEDICARE

## 2022-06-26 ENCOUNTER — APPOINTMENT (OUTPATIENT)
Dept: GENERAL RADIOLOGY | Age: 82
End: 2022-06-26
Payer: MEDICARE

## 2022-06-26 DIAGNOSIS — R07.9 CHEST PAIN, UNSPECIFIED TYPE: Primary | ICD-10-CM

## 2022-06-26 LAB
ABSOLUTE EOS #: 0.3 K/UL (ref 0–0.4)
ABSOLUTE LYMPH #: 1.5 K/UL (ref 1–4.8)
ABSOLUTE MONO #: 0.6 K/UL (ref 0.1–1.3)
ANION GAP SERPL CALCULATED.3IONS-SCNC: 8 MMOL/L (ref 9–17)
BASOPHILS # BLD: 1 % (ref 0–2)
BASOPHILS ABSOLUTE: 0 K/UL (ref 0–0.2)
BUN BLDV-MCNC: 16 MG/DL (ref 8–23)
CALCIUM SERPL-MCNC: 9 MG/DL (ref 8.6–10.4)
CHLORIDE BLD-SCNC: 102 MMOL/L (ref 98–107)
CO2: 25 MMOL/L (ref 20–31)
CREAT SERPL-MCNC: 1.27 MG/DL (ref 0.7–1.2)
EOSINOPHILS RELATIVE PERCENT: 5 % (ref 0–4)
GFR AFRICAN AMERICAN: >60 ML/MIN
GFR NON-AFRICAN AMERICAN: 54 ML/MIN
GFR SERPL CREATININE-BSD FRML MDRD: ABNORMAL ML/MIN/{1.73_M2}
GLUCOSE BLD-MCNC: 107 MG/DL (ref 70–99)
HCT VFR BLD CALC: 36.8 % (ref 41–53)
HEMOGLOBIN: 13.1 G/DL (ref 13.5–17.5)
LYMPHOCYTES # BLD: 30 % (ref 24–44)
MAGNESIUM: 2 MG/DL (ref 1.6–2.6)
MCH RBC QN AUTO: 33.2 PG (ref 26–34)
MCHC RBC AUTO-ENTMCNC: 35.5 G/DL (ref 31–37)
MCV RBC AUTO: 93.6 FL (ref 80–100)
MONOCYTES # BLD: 13 % (ref 1–7)
PDW BLD-RTO: 15.1 % (ref 11.5–14.9)
PLATELET # BLD: 220 K/UL (ref 150–450)
PMV BLD AUTO: 7.1 FL (ref 6–12)
POTASSIUM SERPL-SCNC: 4.6 MMOL/L (ref 3.7–5.3)
RBC # BLD: 3.94 M/UL (ref 4.5–5.9)
REASON FOR REJECTION: NORMAL
SEG NEUTROPHILS: 51 % (ref 36–66)
SEGMENTED NEUTROPHILS ABSOLUTE COUNT: 2.6 K/UL (ref 1.3–9.1)
SODIUM BLD-SCNC: 135 MMOL/L (ref 135–144)
TROPONIN, HIGH SENSITIVITY: 17 NG/L (ref 0–22)
WBC # BLD: 5.1 K/UL (ref 3.5–11)
ZZ NTE CLEAN UP: ORDERED TEST: NORMAL
ZZ NTE WITH NAME CLEAN UP: SPECIMEN SOURCE: NORMAL

## 2022-06-26 PROCEDURE — 36415 COLL VENOUS BLD VENIPUNCTURE: CPT

## 2022-06-26 PROCEDURE — 83735 ASSAY OF MAGNESIUM: CPT

## 2022-06-26 PROCEDURE — 99285 EMERGENCY DEPT VISIT HI MDM: CPT

## 2022-06-26 PROCEDURE — 84484 ASSAY OF TROPONIN QUANT: CPT

## 2022-06-26 PROCEDURE — 85025 COMPLETE CBC W/AUTO DIFF WBC: CPT

## 2022-06-26 PROCEDURE — 93005 ELECTROCARDIOGRAM TRACING: CPT | Performed by: STUDENT IN AN ORGANIZED HEALTH CARE EDUCATION/TRAINING PROGRAM

## 2022-06-26 PROCEDURE — 80048 BASIC METABOLIC PNL TOTAL CA: CPT

## 2022-06-26 PROCEDURE — 71045 X-RAY EXAM CHEST 1 VIEW: CPT

## 2022-06-26 ASSESSMENT — ENCOUNTER SYMPTOMS
CONSTIPATION: 0
COUGH: 0
NAUSEA: 0
ABDOMINAL DISTENTION: 0
DIARRHEA: 0
SINUS PAIN: 0
SHORTNESS OF BREATH: 1
SINUS PRESSURE: 0
SORE THROAT: 0
EYE PAIN: 0
EYE ITCHING: 0
ABDOMINAL PAIN: 0

## 2022-06-26 ASSESSMENT — LIFESTYLE VARIABLES
HOW OFTEN DO YOU HAVE A DRINK CONTAINING ALCOHOL: NEVER
HOW MANY STANDARD DRINKS CONTAINING ALCOHOL DO YOU HAVE ON A TYPICAL DAY: 1 OR 2

## 2022-06-27 ENCOUNTER — APPOINTMENT (OUTPATIENT)
Dept: CT IMAGING | Age: 82
End: 2022-06-27
Payer: MEDICARE

## 2022-06-27 VITALS
SYSTOLIC BLOOD PRESSURE: 114 MMHG | TEMPERATURE: 98.1 F | OXYGEN SATURATION: 98 % | RESPIRATION RATE: 15 BRPM | BODY MASS INDEX: 28.98 KG/M2 | HEART RATE: 52 BPM | WEIGHT: 185 LBS | DIASTOLIC BLOOD PRESSURE: 67 MMHG

## 2022-06-27 LAB
D-DIMER QUANTITATIVE: 8.26 MG/L FEU (ref 0–0.59)
EKG ATRIAL RATE: 54 BPM
EKG P AXIS: 14 DEGREES
EKG P-R INTERVAL: 198 MS
EKG Q-T INTERVAL: 452 MS
EKG QRS DURATION: 110 MS
EKG QTC CALCULATION (BAZETT): 428 MS
EKG R AXIS: 45 DEGREES
EKG T AXIS: 1 DEGREES
EKG VENTRICULAR RATE: 54 BPM
TROPONIN, HIGH SENSITIVITY: 17 NG/L (ref 0–22)

## 2022-06-27 PROCEDURE — 84484 ASSAY OF TROPONIN QUANT: CPT

## 2022-06-27 PROCEDURE — 71260 CT THORAX DX C+: CPT

## 2022-06-27 PROCEDURE — 6360000004 HC RX CONTRAST MEDICATION: Performed by: STUDENT IN AN ORGANIZED HEALTH CARE EDUCATION/TRAINING PROGRAM

## 2022-06-27 PROCEDURE — 36415 COLL VENOUS BLD VENIPUNCTURE: CPT

## 2022-06-27 PROCEDURE — 2580000003 HC RX 258: Performed by: STUDENT IN AN ORGANIZED HEALTH CARE EDUCATION/TRAINING PROGRAM

## 2022-06-27 PROCEDURE — 85379 FIBRIN DEGRADATION QUANT: CPT

## 2022-06-27 RX ORDER — SODIUM CHLORIDE 0.9 % (FLUSH) 0.9 %
10 SYRINGE (ML) INJECTION PRN
Status: DISCONTINUED | OUTPATIENT
Start: 2022-06-27 | End: 2022-06-27 | Stop reason: HOSPADM

## 2022-06-27 RX ORDER — 0.9 % SODIUM CHLORIDE 0.9 %
80 INTRAVENOUS SOLUTION INTRAVENOUS ONCE
Status: COMPLETED | OUTPATIENT
Start: 2022-06-27 | End: 2022-06-27

## 2022-06-27 RX ADMIN — IOPAMIDOL 75 ML: 755 INJECTION, SOLUTION INTRAVENOUS at 03:03

## 2022-06-27 RX ADMIN — SODIUM CHLORIDE 80 ML: 9 INJECTION, SOLUTION INTRAVENOUS at 03:04

## 2022-06-27 RX ADMIN — SODIUM CHLORIDE, PRESERVATIVE FREE 10 ML: 5 INJECTION INTRAVENOUS at 03:03

## 2022-06-27 ASSESSMENT — PAIN - FUNCTIONAL ASSESSMENT: PAIN_FUNCTIONAL_ASSESSMENT: NONE - DENIES PAIN

## 2022-06-27 NOTE — ED NOTES
Mode of arrival (squad #, walk in, police, etc) : LS2        Chief complaint(s): chest pain        Arrival Note (brief scenario, treatment PTA, etc). : Pt arrives to the ED with the complaint of CP and SOB. Pt states that the chest pain comes and goes. Pt reports that it started approximately 40mins PTA of EMS. Pt has a cardiac history. Pt was given nitro and 325mg aspirin by squad. C= \"Have you ever felt that you should Cut down on your drinking? \"  No  A= \"Have people Annoyed you by criticizing your drinking? \"  No  G= \"Have you ever felt bad or Guilty about your drinking? \"  No  E= \"Have you ever had a drink as an Eye-opener first thing in the morning to steady your nerves or to help a hangover? \"  No      Deferred []      Reason for deferring: N/A    *If yes to two or more: probable alcohol abuse. Ruddy Alonso RN  06/26/22 [id number]

## 2022-06-27 NOTE — ED NOTES
Transport in dept. Report provided. Patient to return to [facility]. Sent with javier.      Berenice Mclaughlin RN  06/27/22 [id number]

## 2022-06-27 NOTE — ED PROVIDER NOTES
[address]  Emergency Department Encounter  EmergencyMedicine Resident     Pt Ally Rivas  MRN: [id number]  [place] 1940  Date of evaluation: 6/26/22  PCP:  JW Fraser CNP    This patient was evaluated in the Emergency Department for symptoms described in the history of present illness. The patient was evaluated in the context of the global COVID-19 pandemic, which necessitated consideration that the patient might be at risk for infection with the SARS-CoV-2 virus that causes COVID-19. Institutional protocols and algorithms that pertain to the evaluation of patients at risk for COVID-19 are in a state of rapid change based on information released by regulatory bodies including the CDC and federal and state organizations. These policies and algorithms were followed during the patient's care in the ED. CHIEF COMPLAINT       Chief Complaint   Patient presents with    Chest Pain       HISTORY OF PRESENT ILLNESS  (Location/Symptom, Timing/Onset, Context/Setting, Quality, Duration, Modifying Factors, Severity.)      Qing Choe is a 79 y.o. male who presents with sharp left sided chest pain that started at rest.  Patient states that while he was having chest pain he also had associated shortness of breath. No nausea or vomiting or diaphoresis. He was given sublingual nitro x3 prior to EMS arrival with improvement in symptoms. Patient was recently admitted to [facility] for recurrent chest pain with known CAD. He has a history of LAD stent. Cardiac catheterization was done less than 20 days ago and showed a patent stent. He was recommended for medical management. He was discharged to a nursing facility six days ago due to history of Parkinson's and frequent falls.     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 has a past surgical history that includes Colonoscopy (11/02/2012); shoulder surgery (Right); pr colsc flx w/rmvl of tumor polyp lesion snare tq (N/A, 9/19/2017); and Colonoscopy (09/19/2017). Social History     Socioeconomic History    Marital status: Single     Spouse name: Not on file    Number of children: Not on file    Years of education: Not on file    Highest education level: Not on file   Occupational History    Not on file   Tobacco Use    Smoking status: Former Smoker    Smokeless tobacco: Never Used   Vaping Use    Vaping Use: Never used   Substance and Sexual Activity    Alcohol use: No    Drug use: No    Sexual activity: Not Currently   Other Topics Concern    Not on file   Social History Narrative    Not on file     Social Determinants of Health     Financial Resource Strain: Low Risk     Difficulty of Paying Living Expenses: Not hard at all   Food Insecurity: No Food Insecurity    Worried About [address] in the Last Year: Never true    [address] in the Last Year: Never true   Transportation Needs: No Transportation Needs    Lack of Transportation (Medical): No    Lack of Transportation (Non-Medical): No   Physical Activity: Inactive    Days of Exercise per Week: 0 days    Minutes of Exercise per Session: 0 min   Stress: Stress Concern Present    Feeling of Stress : To some extent   Social Connections: Socially Isolated    Frequency of Communication with Friends and Family: Three times a week    Frequency of Social Gatherings with Friends and Family:  Three times a week    Attends Shinto Services: Never    Active Member of Clubs or Organizations: No    Attends Club or Organization Meetings: Never    Marital Status:    Intimate Partner Violence: Not At Risk    Fear of Current or Ex-Partner: No    Emotionally Abused: No    Physically Abused: No    Sexually Abused: No   Housing Stability: Low Risk     Unable to Pay for Housing in the Last Year: No    Number of Places Lived in the Last Year: 1    Unstable Housing in the Last Year: No       Family History   Problem Relation Age of Onset    No Known Problems Mother     No Known Problems Father        Allergies:  Dye [iodides] and Aspirin    Home Medications:  Prior to Admission medications    Medication Sig Start Date End Date Taking? Authorizing Provider   entacapone (COMTAN) 200 MG tablet TAKE ONE (1) TABLET BY MOUTH FOUR (4) TIMES DAILY WITH SINEMET 6/21/22   Tyler López MD   isosorbide mononitrate (IMDUR) 30 MG extended release tablet Take 1 tablet by mouth daily 6/21/22   Vitaly Rome, DO   metoprolol tartrate (LOPRESSOR) 25 MG tablet Take 0.5 tablets by mouth 2 times daily 6/20/22   Vitaly Rome, DO   gabapentin (NEURONTIN) 100 MG capsule Take 1 capsule by mouth 3 times daily for 30 days. 6/15/22 7/15/22  Bridger Padgett MD   nitroGLYCERIN (NITROSTAT) 0.4 MG SL tablet up to max of 3 total doses.  If no relief after 1 dose, call 911. 6/9/22   JW Amador NP   magnesium oxide (MAG-OX) 400 (240 Mg) MG tablet TAKE 1 TABLET BY MOUTH ONCE DAILY 5/23/22   JW Garcia CNP   atorvastatin (LIPITOR) 40 MG tablet take 1 tablet by mouth once daily 5/17/22   JW Garcia CNP   diclofenac sodium (VOLTAREN) 1 % GEL Apply 2 g topically 2 times daily as needed for Pain (joint pain) 5/9/22   JW Dacosta NP   amitriptyline (ELAVIL) 10 MG tablet Take 1 tablet by mouth nightly 4/5/22   Rosalind Read MD   esomeprazole (NEXIUM) 20 MG delayed release capsule TAKE 1 CAPSULE BY MOUTH ONCE DAILY IN THE MORNING BEFORE BREAKFAST 2/23/22 JW Echeverria CNP   rOPINIRole (REQUIP) 0.25 MG tablet TAKE ONE (1) TABLET BY MOUTH THREE (3) TIMES DAILY 2/23/22   JW Echeverria CNP   carbidopa-levodopa (SINEMET)  MG per tablet TAKE 1 TABLET BY MOUTH 4 TIMES DAILY 12/3/21 3/18/22  JW Barton CNP   clopidogrel (PLAVIX) 75 MG tablet TAKE 1 TABLET BY MOUTH ONCE DAILY  11/5/21   JW Barton CNP   allopurinol (ZYLOPRIM) 100 MG tablet TAKE 1 TABLET BY MOUTH ONCE DAILY  11/5/21   JW Barton CNP   acetaminophen (TYLENOL 8 HOUR ARTHRITIS PAIN) 650 MG extended release tablet Take 1 tablet by mouth every 8 hours as needed for Pain 6/14/21   WJ Echeverria CNP   butalbital-acetaminophen-caffeine (FIORICET, ESGIC) -40 MG per tablet Take 1 tab prn only for severe headache. Can repeat after 4 hrs if needed. Max 2 tabs/24 hrs. Max 4 tabs/week 6/14/21   JW Echeverria CNP       REVIEW OF SYSTEMS    (2-9 systems for level 4, 10 or more for level 5)      Review of Systems   Constitutional: Negative for activity change, chills and fever. HENT: Negative for congestion, sinus pressure, sinus pain and sore throat. Eyes: Negative for pain and itching. Respiratory: Positive for shortness of breath. Negative for cough. Cardiovascular: Positive for chest pain. Gastrointestinal: Negative for abdominal distention, abdominal pain, constipation, diarrhea and nausea. Endocrine: Negative for polyuria. Genitourinary: Negative for dysuria and frequency. Musculoskeletal: Negative for arthralgias. Skin: Negative for rash. Neurological: Negative for light-headedness and headaches. PHYSICAL EXAM   (up to 7 for level 4, 8 or more for level 5)      INITIAL VITALS:   /67   Pulse 52   Temp 98.1 °F (36.7 °C) (Oral)   Resp 15   Wt 185 lb (83.9 kg)   SpO2 98%   BMI 28.98 kg/m²     Physical Exam  Vitals reviewed.    Constitutional:       General: He is not in acute distress. HENT:      Head: Normocephalic and atraumatic. Ears:      Comments: Hearing grossly normal     Nose: Nose normal.      Mouth/Throat:      Mouth: Mucous membranes are moist.      Pharynx: Oropharynx is clear. Eyes:      General: No scleral icterus. Conjunctiva/sclera: Conjunctivae normal.      Pupils: Pupils are equal, round, and reactive to light. Cardiovascular:      Rate and Rhythm: Regular rhythm. Bradycardia present. Pulses: Normal pulses. Comments: CTA BL w/o wheezes or rhonchi. Pulmonary:      Effort: Pulmonary effort is normal. No respiratory distress. Breath sounds: Normal breath sounds. Abdominal:      General: There is no distension. Tenderness: There is no abdominal tenderness. There is no guarding. Musculoskeletal:      Cervical back: No muscular tenderness. Right lower leg: No edema. Left lower leg: No edema. Skin:     General: Skin is warm and dry. Capillary Refill: Capillary refill takes less than 2 seconds. Neurological:      General: No focal deficit present. Mental Status: He is alert and oriented to person, place, and time. Mental status is at baseline.          DIFFERENTIAL  DIAGNOSIS     PLAN (LABS / IMAGING / EKG):  Orders Placed This Encounter   Procedures    XR CHEST PORTABLE    CT CHEST PULMONARY EMBOLISM W CONTRAST    CBC with Auto Differential    SPECIMEN REJECTION    Basic Metabolic Panel    Magnesium    Troponin    D-Dimer, Quantitative    Inpatient consult to Cardiology    EKG 12 Lead    Insert peripheral IV       MEDICATIONS ORDERED:  Orders Placed This Encounter   Medications    0.9 % sodium chloride bolus    DISCONTD: sodium chloride flush 0.9 % injection 10 mL    iopamidol (ISOVUE-370) 76 % injection 75 mL       DIAGNOSTIC RESULTS / EMERGENCY DEPARTMENT COURSE / MDM   LAB RESULTS:  Results for orders placed or performed during the hospital encounter of 06/26/22   CBC with Auto Differential   Result Value Ref Range    WBC 5.1 3.5 - 11.0 k/uL    RBC 3.94 (L) 4.5 - 5.9 m/uL    Hemoglobin 13.1 (L) 13.5 - 17.5 g/dL    Hematocrit 36.8 (L) 41 - 53 %    MCV 93.6 80 - 100 fL    MCH 33.2 26 - 34 pg    MCHC 35.5 31 - 37 g/dL    RDW 15.1 (H) 11.5 - 14.9 %    Platelets 214 723 - 175 k/uL    MPV 7.1 6.0 - 12.0 fL    Seg Neutrophils 51 36 - 66 %    Lymphocytes 30 24 - 44 %    Monocytes 13 (H) 1 - 7 %    Eosinophils % 5 (H) 0 - 4 %    Basophils 1 0 - 2 %    Segs Absolute 2.60 1.3 - 9.1 k/uL    Absolute Lymph # 1.50 1.0 - 4.8 k/uL    Absolute Mono # 0.60 0.1 - 1.3 k/uL    Absolute Eos # 0.30 0.0 - 0.4 k/uL    Basophils Absolute 0.00 0.0 - 0.2 k/uL   Troponin   Result Value Ref Range    Troponin, High Sensitivity 17 0 - 22 ng/L   SPECIMEN REJECTION   Result Value Ref Range    Specimen Source . BLOOD     Ordered Test BMP,MG, TROPI     Reason for Rejection Unable to perform testing: Specimen hemolyzed.     Basic Metabolic Panel   Result Value Ref Range    Glucose 107 (H) 70 - 99 mg/dL    BUN 16 8 - 23 mg/dL    CREATININE 1.27 (H) 0.70 - 1.20 mg/dL    Calcium 9.0 8.6 - 10.4 mg/dL    Sodium 135 135 - 144 mmol/L    Potassium 4.6 3.7 - 5.3 mmol/L    Chloride 102 98 - 107 mmol/L    CO2 25 20 - 31 mmol/L    Anion Gap 8 (L) 9 - 17 mmol/L    GFR Non-African American 54 (L) >60 mL/min    GFR African American >60 >60 mL/min    GFR Comment         Magnesium   Result Value Ref Range    Magnesium 2.0 1.6 - 2.6 mg/dL   Troponin   Result Value Ref Range    Troponin, High Sensitivity 17 0 - 22 ng/L   D-Dimer, Quantitative   Result Value Ref Range    D-Dimer, Quant 8.26 (H) 0.00 - 0.59 mg/L FEU   EKG 12 Lead   Result Value Ref Range    Ventricular Rate 54 BPM    Atrial Rate 54 BPM    P-R Interval 198 ms    QRS Duration 110 ms    Q-T Interval 452 ms    QTc Calculation (Bazett) 428 ms    P Axis 14 degrees    R Axis 45 degrees    T Axis 1 degrees       IMPRESSION: Opal Lara is a 79 y.o. man w/hx of parkinsons and CAD s/p LAD stent presenting for CP which he describes as sharp. He is well appearing and vitally stable. He has a recent cardiac catheterization less than 3 weeks ago that showed patency of the stent. He is high risk for ACS but had a recent negative w/u. Will obtain blood coutns, metabolic panel and troponins. No EKG changes. Given recent hospitalization will assess for pneumonia w/CXR although not meeting SIRS criteria or complaining of any infectious sx. Doubt PE but he is relatively immobile, consider D-dimer. RADIOLOGY:  XR CHEST PORTABLE    Result Date: 6/26/2022  No acute cardiopulmonary findings. CT CHEST PULMONARY EMBOLISM W CONTRAST    Result Date: 6/27/2022  No evidence of pulmonary embolism or acute pulmonary abnormality. Some dependent and linear atelectasis in the posterior lungs and mild COPD changes. Cholelithiasis is also noted. EKG  Bradycardic at 54, sinus, normal intervals, normal axis, no ST elevatiosn or depressions, T wave inversions aVR and V1, no changes from prior EKG    All EKG's are interpreted by the Emergency Department Physician who either signs or Co-signs this chart in the absence of a cardiologist.    EMERGENCY DEPARTMENT COURSE:  Patient seen and evaluated, VSS and nontoxic in appearance. ED Course as of 06/27/22 1714   [place] Jun 26, 2022   2353 BUN,BUNPL: 16 [LR]   2353 Creatinine(!): 1.27  Improved from baseline [LR]   2354 WBC: 5.1 [LR]   2354 Hemoglobin Quant(!): 13.1 [LR]   2354    IMPRESSION:  No acute cardiopulmonary findings. [LR]   [place] Jun 27, 2022   0041 Troponin, High Sensitivity: 17 [LR]   0143 Discussed w/cardiology. CP is sharp w/cardiac cath less than 3 weeks ago. Likely cp is not cardiac in nature. F/u w/provider outpt [LR]      ED Course User Index  [LR] Guicho Phelan DO   ED w/u as noted above. Negative but pt still complaining of CP. D dimer pending at time of signout. No notes of EC Admission Criteria type on file.     PROCEDURES:  none    CONSULTS:  IP CONSULT TO CARDIOLOGY    CRITICAL CARE:  See attending note    FINAL IMPRESSION      1.  Chest pain, unspecified type          DISPOSITION / PLAN     DISPOSITION  Discharged 06/27/22 0300      PATIENT REFERRED TO:  Nelly Birmingham, APRN - CNP  [address] [facility]  [address] [facility],[address]  [address] [facility] [address]  [phone]      As needed, If symptoms worsen    [facility]  [facility] [phone]    As needed, If symptoms worsen      DISCHARGE MEDICATIONS:  Discharge Medication List as of 6/27/2022  3:19 AM          Ananth Vera DO  Emergency Medicine Resident    (Please note that portions of thisnote were completed with a voice recognition program.  Efforts were made to edit the dictations but occasionally words are mis-transcribed.)       Ananth Vera DO  Resident  06/27/22 0008

## 2022-06-27 NOTE — ED PROVIDER NOTES
EMERGENCY DEPARTMENT ENCOUNTER   ATTENDING ATTESTATION     Pt Name: Zeynep Pavon  MRN: [id number]  [place] 1940  Date of evaluation: 6/26/22       Zeynep Pavon is a 79 y.o. male who presents with Chest Pain      MDM:   79-year-old male history of coronary artery disease, hyperlipidemia, hypertension, recent left heart cath about 3 weeks ago which showed patent LAD stent and otherwise nonobstructive coronary artery disease presents for evaluation of chest pain which started several hours ago. Vital signs are stable patient is overall well-appearing. Initial cardiac work-up is unremarkable. Discussed with cardiology who does not feel the patient needs to be admitted and this is noncardiac in etiology. D-dimer was elevated at 8. CT PE was negative for acute pulmonary malaise him. Will discharge back to nursing facility for further outpatient work-up.    ekg  Sinus bradycardia rate of 54 incomplete right bundle branch block T wave inversions in V1, otherwise normal intervals no change from prior    Vitals:   Vitals:    06/27/22 0051 06/27/22 0145 06/27/22 0246 06/27/22 0320   BP: 110/65 (!) 99/58 (!) 99/57 123/67   Pulse: 51 50 50 53   Resp: 20 19 20 18   Temp:       TempSrc:       SpO2: 96% 94% 96% 96%   Weight:             I personally saw and examined the patient. I have reviewed and agree with the resident's findings, including all diagnostic interpretations and treatment plan as written. I was present for the key portions of any procedures performed and the inclusive time noted for any critical care statement. The care is provided during an unprecedented national emergency due to the novel coronavirus, COVID 19.   Jalen Birmingham MD  Attending Emergency Physician            Jalen Birmingham MD  06/27/22 Timo Oneill MD  06/28/22 2909

## 2022-06-29 ENCOUNTER — HOSPITAL ENCOUNTER (OUTPATIENT)
Age: 82
Setting detail: SPECIMEN
Discharge: HOME | End: 2022-06-29

## 2022-06-29 LAB
ANION GAP SERPL CALCULATED.3IONS-SCNC: 10 MMOL/L (ref 9–17)
BUN BLDV-MCNC: 14 MG/DL (ref 8–23)
CALCIUM SERPL-MCNC: 8.7 MG/DL (ref 8.6–10.4)
CHLORIDE BLD-SCNC: 99 MMOL/L (ref 98–107)
CO2: 23 MMOL/L (ref 20–31)
CREAT SERPL-MCNC: 1.3 MG/DL (ref 0.7–1.2)
GFR AFRICAN AMERICAN: >60 ML/MIN
GFR NON-AFRICAN AMERICAN: 53 ML/MIN
GFR SERPL CREATININE-BSD FRML MDRD: ABNORMAL ML/MIN/{1.73_M2}
GLUCOSE BLD-MCNC: 97 MG/DL (ref 70–99)
POTASSIUM SERPL-SCNC: 4.7 MMOL/L (ref 3.7–5.3)
SODIUM BLD-SCNC: 132 MMOL/L (ref 135–144)

## 2022-06-29 PROCEDURE — P9603 ONE-WAY ALLOW PRORATED MILES: HCPCS

## 2022-06-29 PROCEDURE — 36415 COLL VENOUS BLD VENIPUNCTURE: CPT

## 2022-06-29 PROCEDURE — 80048 BASIC METABOLIC PNL TOTAL CA: CPT

## 2022-06-30 ENCOUNTER — CARE COORDINATION (OUTPATIENT)
Dept: CARE COORDINATION | Age: 82
End: 2022-06-30

## 2022-06-30 NOTE — CARE COORDINATION
Ambulatory Care Coordination Note  6/30/2022  CM Risk Score: 5  Charlson 10 Year Mortality Risk Score: 100%     ACC: Morgan Tan, RN    Summary Note: Patient contacted [facility] stating that he will be going home from SNF on 7.1.22. He was asking about upcoming appointments. [facility] provided him with information and will make sure patient receives a reminder call before that day. Patient stated that he will be driving himself to his appointments. [facility] will update ZEB Lomax on his return home. Ambulatory Care Coordination Assessment    Care Coordination Protocol  Week 1 - Initial Assessment     Do you have all of your prescriptions and are they filled?: No  Barriers to medication adherence: Forgets to take  Are you able to afford your medications?: Yes  How often do you have trouble taking your medications the way you have been told to take them?: Sometimes I take them as prescribed. Do you have Home O2 Therapy?: No      Ability to seek help/take action for Emergent Urgent situations i.e. fire, crime, inclement weather or health crisis. : Dependent  Ability to ambulate to restroom: Dependent  Ability handle personal hygeine needs (bathing/dressing/grooming): Dependent  Ability to manage Medications: Needs Assistance  Ability to prepare Food Preparation: Needs Assistance  Ability to maintain home (clean home, laundry): Needs Assistance  Ability to drive and/or has transportation: Needs Assistance  Ability to do shopping: Needs Assistance  Ability to manage finances: Needs Assistance  Is patient able to live independently?: Yes     Current Housing: Private Residence           Frequent urination at night?: Yes  Do you use rails/bars?: No  Do you have a non-slip tub mat?: No     Are you experiencing loss of meaning?: No (Comment: sometimes I feel lonely)  Are you experiencing loss of hope and peace?: No     Suggested Interventions and Community Resources                  Prior to Admission medications    Medication Sig Start Date End Date Taking? Authorizing Provider   entacapone (COMTAN) 200 MG tablet TAKE ONE (1) TABLET BY MOUTH FOUR (4) TIMES DAILY WITH SINEMET 6/21/22   Josi Doty MD   isosorbide mononitrate (IMDUR) 30 MG extended release tablet Take 1 tablet by mouth daily 6/21/22   Daria Marcano DO   metoprolol tartrate (LOPRESSOR) 25 MG tablet Take 0.5 tablets by mouth 2 times daily 6/20/22   Daria Marcano DO   gabapentin (NEURONTIN) 100 MG capsule Take 1 capsule by mouth 3 times daily for 30 days. 6/15/22 7/15/22  Loulou Gonzalez MD   nitroGLYCERIN (NITROSTAT) 0.4 MG SL tablet up to max of 3 total doses.  If no relief after 1 dose, call 911. 6/9/22   JW Sheets NP   magnesium oxide (MAG-OX) 400 (240 Mg) MG tablet TAKE 1 TABLET BY MOUTH ONCE DAILY 5/23/22   JW Paredes CNP   atorvastatin (LIPITOR) 40 MG tablet take 1 tablet by mouth once daily 5/17/22   JW Paredes CNP   diclofenac sodium (VOLTAREN) 1 % GEL Apply 2 g topically 2 times daily as needed for Pain (joint pain) 5/9/22   JW Tse NP   amitriptyline (ELAVIL) 10 MG tablet Take 1 tablet by mouth nightly 4/5/22   Alaina Rose MD   esomeprazole (NEXIUM) 20 MG delayed release capsule TAKE 1 CAPSULE BY MOUTH ONCE DAILY IN THE MORNING BEFORE BREAKFAST 2/23/22   JW Paredes CNP   rOPINIRole (REQUIP) 0.25 MG tablet TAKE ONE (1) TABLET BY MOUTH THREE (3) TIMES DAILY 2/23/22   JW Paredes CNP   carbidopa-levodopa (SINEMET)  MG per tablet TAKE 1 TABLET BY MOUTH 4 TIMES DAILY 12/3/21 3/18/22  JW Bradford CNP   clopidogrel (PLAVIX) 75 MG tablet TAKE 1 TABLET BY MOUTH ONCE DAILY  11/5/21   JW Bradford CNP   allopurinol (ZYLOPRIM) 100 MG tablet TAKE 1 TABLET BY MOUTH ONCE DAILY  11/5/21   Wileen Monroe, APRN - CNP   acetaminophen (TYLENOL 8 HOUR ARTHRITIS PAIN) 650 MG extended release tablet Take 1 tablet by mouth every 8 hours as needed for Pain 6/14/21   Courtney Barnett Dene Drummer, APRN - CNP   butalbital-acetaminophen-caffeine (FIORICET, ESGIC) -40 MG per tablet Take 1 tab prn only for severe headache. Can repeat after 4 hrs if needed. Max 2 tabs/24 hrs.  Max 4 tabs/week 6/14/21   JW Iniguez CNP       Future Appointments   Date Time Provider [facility]   7/7/2022 11:30 AM JW Iniguez CNP [address] [place] [address]   7/7/2022  1:00 PM JW Luz CNP [address]   7/19/2022  1:00 PM Alida Luna MD [facility]      and   General Assessment    Do you have any symptoms that are causing concern?: No

## 2022-07-06 ENCOUNTER — CARE COORDINATION (OUTPATIENT)
Dept: CARE COORDINATION | Age: 82
End: 2022-07-06

## 2022-07-06 NOTE — CARE COORDINATION
HC phoned the patient today and provided information for his appointment tomorrow, [phone] for   a Hospital Follow Up with his PCP@ 11:30a, and his appointment @ [facility]  @1p. Patient stated that he appreciated the call. He wrote the information down and stated  that he will be driving and again that he appreciated the call and information.     Plan of Care  [facility]. will follow up with patient for his next appointment reminder

## 2022-07-07 ENCOUNTER — CARE COORDINATION (OUTPATIENT)
Dept: CARE COORDINATION | Age: 82
End: 2022-07-07

## 2022-07-07 ENCOUNTER — HOSPITAL ENCOUNTER (OUTPATIENT)
Dept: PAIN MANAGEMENT | Age: 82
Discharge: HOME | End: 2022-07-07
Payer: MEDICARE

## 2022-07-07 VITALS
BODY MASS INDEX: 27.94 KG/M2 | WEIGHT: 178 LBS | OXYGEN SATURATION: 96 % | SYSTOLIC BLOOD PRESSURE: 119 MMHG | DIASTOLIC BLOOD PRESSURE: 78 MMHG | HEART RATE: 65 BPM | HEIGHT: 67 IN

## 2022-07-07 DIAGNOSIS — M25.50 CHRONIC PAIN OF MULTIPLE JOINTS: Primary | Chronic | ICD-10-CM

## 2022-07-07 DIAGNOSIS — G89.29 CHRONIC PAIN OF MULTIPLE JOINTS: Primary | Chronic | ICD-10-CM

## 2022-07-07 DIAGNOSIS — M48.061 SPINAL STENOSIS OF LUMBAR REGION WITHOUT NEUROGENIC CLAUDICATION: ICD-10-CM

## 2022-07-07 DIAGNOSIS — M47.816 LUMBAR FACET ARTHROPATHY: ICD-10-CM

## 2022-07-07 DIAGNOSIS — R69 MULTIPLE COMORBID CONDITIONS: ICD-10-CM

## 2022-07-07 PROCEDURE — 99213 OFFICE O/P EST LOW 20 MIN: CPT

## 2022-07-07 PROCEDURE — 99213 OFFICE O/P EST LOW 20 MIN: CPT | Performed by: NURSE PRACTITIONER

## 2022-07-07 ASSESSMENT — PAIN DESCRIPTION - ORIENTATION: ORIENTATION: LEFT;LOWER

## 2022-07-07 ASSESSMENT — ENCOUNTER SYMPTOMS
BOWEL INCONTINENCE: 0
COUGH: 0
BACK PAIN: 1
SHORTNESS OF BREATH: 0
CONSTIPATION: 0

## 2022-07-07 ASSESSMENT — PAIN DESCRIPTION - PROGRESSION: CLINICAL_PROGRESSION: NOT CHANGED

## 2022-07-07 ASSESSMENT — PAIN DESCRIPTION - DESCRIPTORS: DESCRIPTORS: ACHING;SHARP

## 2022-07-07 ASSESSMENT — PAIN SCALES - GENERAL: PAINLEVEL_OUTOF10: 6

## 2022-07-07 ASSESSMENT — PAIN DESCRIPTION - LOCATION: LOCATION: BACK;KNEE

## 2022-07-07 ASSESSMENT — PAIN DESCRIPTION - PAIN TYPE: TYPE: CHRONIC PAIN

## 2022-07-07 ASSESSMENT — PAIN DESCRIPTION - FREQUENCY: FREQUENCY: CONTINUOUS

## 2022-07-07 NOTE — PROGRESS NOTES
Chief Complaint   Patient presents with    Back Pain    Follow Up After Procedure         [facility]     This is a pleasant 58-year-old gentleman with multiple major comorbidities  He is complaining of pain involving multiple body side including neck shoulder back and knee  His major pain issue today is left knee pain  Pain is chronic onset more than 1 year ago aggravates with activity  Patient is on long-term antiplatelet therapy  Here today for f/u after left knee injection and reports signficant relif for about 1 week    Was in hospital for chest pain and was told he had a stroke and went to rehab for a couple weeks for strengthening    Today main c/o is lower back pain with no new injury just has flareups - offered injection and pt is interested      Back Pain  This is a chronic problem. The current episode started more than 1 year ago. The problem occurs constantly. The problem is unchanged. The pain is present in the lumbar spine and gluteal. The quality of the pain is described as aching, cramping and stabbing. The pain does not radiate. The pain is at a severity of 6/10. The pain is moderate. The pain is worse during the day. The symptoms are aggravated by bending, sitting, standing, twisting, position and coughing. Stiffness is present in the morning. Pertinent negatives include no bladder incontinence, bowel incontinence, chest pain or fever. He has tried heat, ice, walking, NSAIDs, bed rest and home exercises for the symptoms. The treatment provided moderate relief.                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 ([facility] [address] 3/18/2011    d/t being hurt on the job    CKD (chronic kidney disease) stage 2, GFR 60-89 ml/min     CKD (chronic kidney disease) stage 3, GFR 30-59 ml/min [facility]     Diverticulosis of colon     GERD (gastroesophageal reflux disease)     Impaired renal function     MRSA (methicillin resistant staph aureus) culture positive 9/23/2015    leg    Osteoarthritis of knee     Left    Parkinson disease ([facility] [address]     Proteinuria     Skull fracture ([facility] [address] 3/18/2011    d/t 12-foot drop on the job    Temporary loss of eyesight     periodically, happened x7    [facility]       Past Surgical History:   Procedure Laterality Date    COLONOSCOPY  11/02/2012    poor prep, tubular adenoma    COLONOSCOPY  09/19/2017    diverticulosis, multiple polyps as described, spot marking done, pathology-tubular adenoma x 4    GA COLSC FLX W/RMVL OF TUMOR POLYP LESION SNARE TQ N/A 9/19/2017    COLONOSCOPY POLYPECTOMY SNARE/COLD BIOPSY with tatooing and apc transverse colon performed by Joan Doss MD at [place] [address] Right     rotator cuff       Allergies   Allergen Reactions    Dye [Iodides]      pain    Aspirin      Brain bleed           Current Outpatient Medications:     entacapone (COMTAN) 200 MG tablet, TAKE ONE (1) TABLET BY MOUTH FOUR (4) TIMES DAILY WITH SINEMET, Disp: 90 tablet, Rfl: 3    isosorbide mononitrate (IMDUR) 30 MG extended release tablet, Take 1 tablet by mouth daily, Disp: 30 tablet, Rfl: 3    metoprolol tartrate (LOPRESSOR) 25 MG tablet, Take 0.5 tablets by mouth 2 times daily, Disp: 60 tablet, Rfl: 3    gabapentin (NEURONTIN) 100 MG capsule, Take 1 capsule by mouth 3 times daily for 30 days. , Disp: 90 capsule, Rfl: 0    nitroGLYCERIN (NITROSTAT) 0.4 MG SL tablet, up to max of 3 total doses.  If no relief after 1 dose, call 911., Disp: 25 tablet, Rfl: 0    magnesium oxide (MAG-OX) 400 (240 Mg) MG tablet, TAKE 1 TABLET BY MOUTH ONCE DAILY, Disp: 90 tablet, Rfl: 2    atorvastatin (LIPITOR) 40 MG tablet, take 1 tablet by mouth once daily, Disp: 90 tablet, Rfl: 3    diclofenac sodium (VOLTAREN) 1 % GEL, Apply 2 g topically 2 times daily as needed for Pain (joint pain), Disp: 350 g, Rfl: 0    amitriptyline (ELAVIL) 10 MG tablet, Take 1 tablet by mouth nightly, Disp: 90 tablet, Rfl: 3    esomeprazole (NEXIUM) 20 MG delayed release capsule, TAKE 1 CAPSULE BY MOUTH ONCE DAILY IN THE MORNING BEFORE BREAKFAST, Disp: 90 capsule, Rfl: 1    rOPINIRole (REQUIP) 0.25 MG tablet, TAKE ONE (1) TABLET BY MOUTH THREE (3) TIMES DAILY, Disp: 90 tablet, Rfl: 5    carbidopa-levodopa (SINEMET)  MG per tablet, TAKE 1 TABLET BY MOUTH 4 TIMES DAILY, Disp: 120 tablet, Rfl: 9    clopidogrel (PLAVIX) 75 MG tablet, TAKE 1 TABLET BY MOUTH ONCE DAILY , Disp: 90 tablet, Rfl: 9    allopurinol (ZYLOPRIM) 100 MG tablet, TAKE 1 TABLET BY MOUTH ONCE DAILY , Disp: 90 tablet, Rfl: 9    acetaminophen (TYLENOL 8 HOUR ARTHRITIS PAIN) 650 MG extended release tablet, Take 1 tablet by mouth every 8 hours as needed for Pain, Disp: 90 tablet, Rfl: 5    butalbital-acetaminophen-caffeine (FIORICET, ESGIC) -40 MG per tablet, Take 1 tab prn only for severe headache. Can repeat after 4 hrs if needed. Max 2 tabs/24 hrs.  Max 4 tabs/week, Disp: 28 tablet, Rfl: 3    Family History   Problem Relation Age of Onset    No Known Problems Mother     No Known Problems Father        Social History     Socioeconomic History    Marital status: Single     Spouse name: Not on file    Number of children: Not on file    Years of education: Not on file    Highest education level: Not on file   Occupational History    Not on file   Tobacco Use    Smoking status: Former Smoker    Smokeless tobacco: Never Used   Vaping Use    Vaping Use: Never used   Substance and Sexual Activity    Alcohol use: No    Drug use: No    Sexual activity: Not Currently   Other Topics Concern    Not on file   Social History Narrative    Not on file     Social Determinants of Health     Financial Resource Strain: Low Risk     Difficulty of Paying Living Expenses: Not hard at all   Food Insecurity: No Food Insecurity    Worried About [address] in the Last Year: Never true    [address] in the Last Year: Never true Transportation Needs: No Transportation Needs    Lack of Transportation (Medical): No    Lack of Transportation (Non-Medical): No   Physical Activity: Inactive    Days of Exercise per Week: 0 days    Minutes of Exercise per Session: 0 min   Stress: Stress Concern Present    Feeling of Stress : To some extent   Social Connections: Socially Isolated    Frequency of Communication with Friends and Family: Three times a week    Frequency of Social Gatherings with Friends and Family: Three times a week    Attends Rastafarian Services: Never    Active Member of Clubs or Organizations: No    Attends Club or Organization Meetings: Never    Marital Status:    Intimate Partner Violence: Not At Risk    Fear of Current or Ex-Partner: No    Emotionally Abused: No    Physically Abused: No    Sexually Abused: No   Housing Stability: Low Risk     Unable to Pay for Housing in the Last Year: No    Number of [place] in the Last Year: 1    Unstable Housing in the Last Year: No       Review of Systems:  Review of Systems   Constitutional: Negative for chills and fever. Cardiovascular: Negative for chest pain. Respiratory: Negative for cough and shortness of breath. Musculoskeletal: Positive for back pain. Gastrointestinal: Negative for bowel incontinence and constipation. Genitourinary: Negative for bladder incontinence. Physical Exam:  /78   Pulse 65   Ht 5' 7\" (1.702 m)   Wt 178 lb (80.7 kg)   SpO2 96%   BMI 27.88 kg/m²     Physical Exam  Cardiovascular:      Rate and Rhythm: Normal rate. Pulmonary:      Effort: Pulmonary effort is normal.   Musculoskeletal:      Lumbar back: Decreased range of motion. Comments: In w/c using a cane   Skin:     General: Skin is warm and dry. Neurological:      Mental Status: He is alert and oriented to person, place, and time.            Assessment:  Problem List Items Addressed This Visit     Chronic pain of multiple joints - Primary (Chronic)    Multiple comorbid conditions (Chronic)      Other Visit Diagnoses     Spinal stenosis of lumbar region without neurogenic claudication        Relevant Orders    [place] INJ DX/THER AGNT PARAVERT FACET JOINT, LUMBAR/SAC, 1ST LEVEL    Lumbar facet arthropathy        Relevant Orders    [place] INJ DX/THER AGNT PARAVERT FACET JOINT, LUMBAR/SAC, 1ST LEVEL             Treatment Plan:    Patient relates significant relief from recent intervention  Diagnostic bilat lumbar MBB L4 L5 for worsening lumbar pain     I have reviewed the chief complaint and history of present illness (including ROS and PFSH) and vital documentation by my staff and I agree with their documentation and have added where applicable.

## 2022-07-11 ENCOUNTER — CARE COORDINATION (OUTPATIENT)
Dept: CARE COORDINATION | Age: 82
End: 2022-07-11

## 2022-07-11 NOTE — CARE COORDINATION
PCP requested ACM to assist with setting up cardiology appointment. ACM called patient who approved of assistance. ACM spoke with cardiology and was told that he has an appt on 8-18-22 in the [facility] office with Dr. Faisal Otto at 1 pm.  ACM called patient back and had to leave a detailed VM with appointment information.    ACM will ask for assistance in scheduling a ride from [facility].

## 2022-07-12 ENCOUNTER — CARE COORDINATION (OUTPATIENT)
Dept: CARE COORDINATION | Age: 82
End: 2022-07-12

## 2022-07-12 NOTE — CARE COORDINATION
Message received from [facility], regarding patient's upcoming appointments and   and arranging transportation for the patient, as well. Patient stated that he never   knows how he will be feeling on a particular day. HC informed the patient that we   will arrange for cab transportation and will follow up with the patient a day or two o  ahead of his appointment and if the patient feels that he can drive, HC will cancel  the cab transportation. Transportation has been arranged for appointments  1) Tuesday, 07/19/22,1p @ [facility]/Neurology, [address]  Patient is scheduled  to be picked up @ 11:50a, and his return ride has been arranged as well  2) Monday, 07/25/22 @ 11:45a @ [facility]/Pain Management, patient to be dropped off @  the [facility] w/Orion Mandujano. Plan of Care    Patient's appointment for Monday, 08/08/22 @ [facility] [address], with Dr. Shonda Johnson. .. will be scheduled on 07/18 when [facility] reminds patient  of his 07/19/22 appt.

## 2022-07-12 NOTE — CARE COORDINATION
Transportation was arranged for patient's appointment for Monday, 08/08/22, @ 1p. Patient will be picked up @ 11:30a and transported to [facility] [place]. [facility], [address] [place] [address]. Plan of Care  Patient will be informed of his transportation arrangements, when the [facility]. phoned  him on future calls.

## 2022-07-18 ENCOUNTER — CARE COORDINATION (OUTPATIENT)
Dept: CARE COORDINATION | Age: 82
End: 2022-07-18

## 2022-07-18 NOTE — CARE COORDINATION
Patient returned HC's call, she informed the patient of his appointment on Tuesday, 07/19/22, @ 1p @ [facility]Neurology. Patient is scheduled to be picked up @ 11:50a. Patient stated an understanding of information. Plan of Care  [facility] [place]. will make sure patient is reminded of his 07/25/22 appointment for pain management.

## 2022-07-18 NOTE — CARE COORDINATION
HC attempted to contact this patient, there was no answer. HC attempted to leave a message for patient, unsure if he received it.     Plan of Care  Call patient again later today

## 2022-07-19 ENCOUNTER — OFFICE VISIT (OUTPATIENT)
Dept: NEUROLOGY | Age: 82
End: 2022-07-19
Payer: MEDICARE

## 2022-07-19 ENCOUNTER — APPOINTMENT (OUTPATIENT)
Dept: GENERAL RADIOLOGY | Age: 82
End: 2022-07-19
Payer: MEDICARE

## 2022-07-19 ENCOUNTER — HOSPITAL ENCOUNTER (OUTPATIENT)
Age: 82
Setting detail: OBSERVATION
Discharge: NURSING, REHAB OR LONG-TERM CARE | End: 2022-07-22
Attending: EMERGENCY MEDICINE | Admitting: EMERGENCY MEDICINE
Payer: MEDICARE

## 2022-07-19 ENCOUNTER — CARE COORDINATION (OUTPATIENT)
Dept: CARE COORDINATION | Age: 82
End: 2022-07-19

## 2022-07-19 VITALS
BODY MASS INDEX: 27.94 KG/M2 | WEIGHT: 178 LBS | HEIGHT: 67 IN | OXYGEN SATURATION: 98 % | DIASTOLIC BLOOD PRESSURE: 70 MMHG | HEART RATE: 63 BPM | SYSTOLIC BLOOD PRESSURE: 139 MMHG

## 2022-07-19 DIAGNOSIS — G20 PARKINSON DISEASE (HCC): Primary | ICD-10-CM

## 2022-07-19 DIAGNOSIS — R53.1 GENERAL WEAKNESS: Primary | ICD-10-CM

## 2022-07-19 DIAGNOSIS — R48.2 GAIT APRAXIA: ICD-10-CM

## 2022-07-19 LAB
ABSOLUTE EOS #: 0.07 K/UL (ref 0–0.44)
ABSOLUTE IMMATURE GRANULOCYTE: <0.03 K/UL (ref 0–0.3)
ABSOLUTE LYMPH #: 1.46 K/UL (ref 1.1–3.7)
ABSOLUTE MONO #: 0.69 K/UL (ref 0.1–1.2)
ALBUMIN SERPL-MCNC: 4.4 G/DL (ref 3.5–5.2)
ALBUMIN/GLOBULIN RATIO: 1.5 (ref 1–2.5)
ALP BLD-CCNC: 110 U/L (ref 40–129)
ALT SERPL-CCNC: 25 U/L (ref 5–41)
ANION GAP SERPL CALCULATED.3IONS-SCNC: 12 MMOL/L (ref 9–17)
AST SERPL-CCNC: 25 U/L
BASOPHILS # BLD: 0 % (ref 0–2)
BASOPHILS ABSOLUTE: 0.03 K/UL (ref 0–0.2)
BILIRUB SERPL-MCNC: 0.5 MG/DL (ref 0.3–1.2)
BILIRUBIN URINE: NEGATIVE
BUN BLDV-MCNC: 19 MG/DL (ref 8–23)
CALCIUM SERPL-MCNC: 9.8 MG/DL (ref 8.6–10.4)
CHLORIDE BLD-SCNC: 102 MMOL/L (ref 98–107)
CO2: 26 MMOL/L (ref 20–31)
COLOR: YELLOW
COMMENT UA: ABNORMAL
CREAT SERPL-MCNC: 1.45 MG/DL (ref 0.7–1.2)
EOSINOPHILS RELATIVE PERCENT: 1 % (ref 1–4)
GFR AFRICAN AMERICAN: 56 ML/MIN
GFR NON-AFRICAN AMERICAN: 47 ML/MIN
GFR SERPL CREATININE-BSD FRML MDRD: ABNORMAL ML/MIN/{1.73_M2}
GLUCOSE BLD-MCNC: 95 MG/DL (ref 70–99)
GLUCOSE URINE: NEGATIVE
HCT VFR BLD CALC: 40.3 % (ref 40.7–50.3)
HEMOGLOBIN: 13.9 G/DL (ref 13–17)
IMMATURE GRANULOCYTES: 0 %
KETONES, URINE: NEGATIVE
LEUKOCYTE ESTERASE, URINE: NEGATIVE
LYMPHOCYTES # BLD: 21 % (ref 24–43)
MCH RBC QN AUTO: 32.3 PG (ref 25.2–33.5)
MCHC RBC AUTO-ENTMCNC: 34.5 G/DL (ref 28.4–34.8)
MCV RBC AUTO: 93.7 FL (ref 82.6–102.9)
MONOCYTES # BLD: 10 % (ref 3–12)
NITRITE, URINE: NEGATIVE
NRBC AUTOMATED: 0 PER 100 WBC
PDW BLD-RTO: 14.3 % (ref 11.8–14.4)
PH UA: 6.5 (ref 5–8)
PLATELET # BLD: 191 K/UL (ref 138–453)
PMV BLD AUTO: 8.9 FL (ref 8.1–13.5)
POTASSIUM SERPL-SCNC: 4.9 MMOL/L (ref 3.7–5.3)
PROTEIN UA: NEGATIVE
RBC # BLD: 4.3 M/UL (ref 4.21–5.77)
SARS-COV-2, RAPID: NOT DETECTED
SEG NEUTROPHILS: 68 % (ref 36–65)
SEGMENTED NEUTROPHILS ABSOLUTE COUNT: 4.84 K/UL (ref 1.5–8.1)
SODIUM BLD-SCNC: 140 MMOL/L (ref 135–144)
SPECIFIC GRAVITY UA: 1 (ref 1–1.03)
SPECIMEN DESCRIPTION: NORMAL
TOTAL PROTEIN: 7.3 G/DL (ref 6.4–8.3)
TROPONIN, HIGH SENSITIVITY: 18 NG/L (ref 0–22)
TROPONIN, HIGH SENSITIVITY: 22 NG/L (ref 0–22)
TURBIDITY: CLEAR
URINE HGB: NEGATIVE
UROBILINOGEN, URINE: NORMAL
WBC # BLD: 7.1 K/UL (ref 3.5–11.3)

## 2022-07-19 PROCEDURE — 80053 COMPREHEN METABOLIC PANEL: CPT

## 2022-07-19 PROCEDURE — 93005 ELECTROCARDIOGRAM TRACING: CPT | Performed by: STUDENT IN AN ORGANIZED HEALTH CARE EDUCATION/TRAINING PROGRAM

## 2022-07-19 PROCEDURE — 6370000000 HC RX 637 (ALT 250 FOR IP): Performed by: STUDENT IN AN ORGANIZED HEALTH CARE EDUCATION/TRAINING PROGRAM

## 2022-07-19 PROCEDURE — 1123F ACP DISCUSS/DSCN MKR DOCD: CPT | Performed by: PSYCHIATRY & NEUROLOGY

## 2022-07-19 PROCEDURE — 81003 URINALYSIS AUTO W/O SCOPE: CPT

## 2022-07-19 PROCEDURE — 87635 SARS-COV-2 COVID-19 AMP PRB: CPT

## 2022-07-19 PROCEDURE — 99285 EMERGENCY DEPT VISIT HI MDM: CPT

## 2022-07-19 PROCEDURE — 85025 COMPLETE CBC W/AUTO DIFF WBC: CPT

## 2022-07-19 PROCEDURE — G0378 HOSPITAL OBSERVATION PER HR: HCPCS

## 2022-07-19 PROCEDURE — 99214 OFFICE O/P EST MOD 30 MIN: CPT | Performed by: PSYCHIATRY & NEUROLOGY

## 2022-07-19 PROCEDURE — 71045 X-RAY EXAM CHEST 1 VIEW: CPT

## 2022-07-19 PROCEDURE — 2580000003 HC RX 258: Performed by: STUDENT IN AN ORGANIZED HEALTH CARE EDUCATION/TRAINING PROGRAM

## 2022-07-19 PROCEDURE — 84484 ASSAY OF TROPONIN QUANT: CPT

## 2022-07-19 RX ORDER — SODIUM CHLORIDE 0.9 % (FLUSH) 0.9 %
5-40 SYRINGE (ML) INJECTION EVERY 12 HOURS SCHEDULED
Status: DISCONTINUED | OUTPATIENT
Start: 2022-07-20 | End: 2022-07-22 | Stop reason: HOSPADM

## 2022-07-19 RX ORDER — 0.9 % SODIUM CHLORIDE 0.9 %
1000 INTRAVENOUS SOLUTION INTRAVENOUS ONCE
Status: COMPLETED | OUTPATIENT
Start: 2022-07-19 | End: 2022-07-19

## 2022-07-19 RX ORDER — CLOPIDOGREL BISULFATE 75 MG/1
75 TABLET ORAL DAILY
Status: DISCONTINUED | OUTPATIENT
Start: 2022-07-20 | End: 2022-07-22 | Stop reason: HOSPADM

## 2022-07-19 RX ORDER — ONDANSETRON 2 MG/ML
4 INJECTION INTRAMUSCULAR; INTRAVENOUS EVERY 6 HOURS PRN
Status: DISCONTINUED | OUTPATIENT
Start: 2022-07-19 | End: 2022-07-22 | Stop reason: HOSPADM

## 2022-07-19 RX ORDER — ROPINIROLE 0.25 MG/1
0.25 TABLET, FILM COATED ORAL NIGHTLY
Status: DISCONTINUED | OUTPATIENT
Start: 2022-07-20 | End: 2022-07-22 | Stop reason: HOSPADM

## 2022-07-19 RX ORDER — GABAPENTIN 100 MG/1
100 CAPSULE ORAL 3 TIMES DAILY
Status: DISCONTINUED | OUTPATIENT
Start: 2022-07-20 | End: 2022-07-22 | Stop reason: HOSPADM

## 2022-07-19 RX ORDER — ATORVASTATIN CALCIUM 40 MG/1
40 TABLET, FILM COATED ORAL DAILY
Status: DISCONTINUED | OUTPATIENT
Start: 2022-07-20 | End: 2022-07-22 | Stop reason: HOSPADM

## 2022-07-19 RX ORDER — ENTACAPONE 200 MG/1
200 TABLET ORAL 4 TIMES DAILY
Status: DISCONTINUED | OUTPATIENT
Start: 2022-07-19 | End: 2022-07-22 | Stop reason: HOSPADM

## 2022-07-19 RX ORDER — ACETAMINOPHEN 325 MG/1
650 TABLET ORAL EVERY 4 HOURS PRN
Status: DISCONTINUED | OUTPATIENT
Start: 2022-07-19 | End: 2022-07-22 | Stop reason: HOSPADM

## 2022-07-19 RX ORDER — ALLOPURINOL 100 MG/1
100 TABLET ORAL DAILY
Status: DISCONTINUED | OUTPATIENT
Start: 2022-07-20 | End: 2022-07-22 | Stop reason: HOSPADM

## 2022-07-19 RX ORDER — PANTOPRAZOLE SODIUM 40 MG/1
40 TABLET, DELAYED RELEASE ORAL
Status: DISCONTINUED | OUTPATIENT
Start: 2022-07-20 | End: 2022-07-22 | Stop reason: HOSPADM

## 2022-07-19 RX ORDER — ISOSORBIDE MONONITRATE 30 MG/1
30 TABLET, EXTENDED RELEASE ORAL DAILY
Status: DISCONTINUED | OUTPATIENT
Start: 2022-07-20 | End: 2022-07-22 | Stop reason: HOSPADM

## 2022-07-19 RX ORDER — ONDANSETRON 4 MG/1
4 TABLET, ORALLY DISINTEGRATING ORAL EVERY 8 HOURS PRN
Status: DISCONTINUED | OUTPATIENT
Start: 2022-07-19 | End: 2022-07-22 | Stop reason: HOSPADM

## 2022-07-19 RX ORDER — SODIUM CHLORIDE 9 MG/ML
INJECTION, SOLUTION INTRAVENOUS PRN
Status: DISCONTINUED | OUTPATIENT
Start: 2022-07-19 | End: 2022-07-22 | Stop reason: HOSPADM

## 2022-07-19 RX ORDER — SODIUM CHLORIDE 0.9 % (FLUSH) 0.9 %
5-40 SYRINGE (ML) INJECTION PRN
Status: DISCONTINUED | OUTPATIENT
Start: 2022-07-19 | End: 2022-07-22 | Stop reason: HOSPADM

## 2022-07-19 RX ORDER — AMITRIPTYLINE HYDROCHLORIDE 10 MG/1
10 TABLET, FILM COATED ORAL NIGHTLY
Status: DISCONTINUED | OUTPATIENT
Start: 2022-07-20 | End: 2022-07-22 | Stop reason: HOSPADM

## 2022-07-19 RX ADMIN — SODIUM CHLORIDE 1000 ML: 9 INJECTION, SOLUTION INTRAVENOUS at 14:59

## 2022-07-19 RX ADMIN — CARBIDOPA AND LEVODOPA 1 TABLET: 25; 100 TABLET ORAL at 20:12

## 2022-07-19 RX ADMIN — ENTACAPONE 200 MG: 200 TABLET, FILM COATED ORAL at 20:58

## 2022-07-19 ASSESSMENT — ENCOUNTER SYMPTOMS
VOICE CHANGE: 0
VOMITING: 0
PHOTOPHOBIA: 0
DIARRHEA: 0
CHEST TIGHTNESS: 0
TROUBLE SWALLOWING: 0
ABDOMINAL PAIN: 0
SHORTNESS OF BREATH: 0

## 2022-07-19 ASSESSMENT — PAIN DESCRIPTION - PAIN TYPE: TYPE: ACUTE PAIN

## 2022-07-19 ASSESSMENT — PAIN SCALES - GENERAL: PAINLEVEL_OUTOF10: 1

## 2022-07-19 ASSESSMENT — PAIN DESCRIPTION - FREQUENCY: FREQUENCY: CONTINUOUS

## 2022-07-19 ASSESSMENT — PAIN - FUNCTIONAL ASSESSMENT
PAIN_FUNCTIONAL_ASSESSMENT: ACTIVITIES ARE NOT PREVENTED
PAIN_FUNCTIONAL_ASSESSMENT: 0-10

## 2022-07-19 ASSESSMENT — PAIN DESCRIPTION - LOCATION: LOCATION: BACK

## 2022-07-19 ASSESSMENT — PAIN DESCRIPTION - DESCRIPTORS: DESCRIPTORS: BURNING

## 2022-07-19 NOTE — ED PROVIDER NOTES
[address] [facility]     Emergency Department     Faculty Attestation    I performed a history and physical examination of the patient and discussed management with the resident. I reviewed the residents note and agree with the documented findings and plan of care. Any areas of disagreement are noted on the chart. I was personally present for the key portions of any procedures. I have documented in the chart those procedures where I was not present during the key portions. I have reviewed the emergency nurses triage note. I agree with the chief complaint, past medical history, past surgical history, all patient referred to ED from his neurologist.  States he is felt fatigued and weak for the last 2 days no focal weakness just does not feel quite right. No chest pain shortness of breath no fevers no abdominal pain no nausea vomiting. Denies any urinary symptoms. On exam well-appearing has a baseline tremor with his Parkinson's which she states is not new or worse than usual.  Lungs clear heart normal abdomen soft nontender. Will check labs EKG, urine, reevaluate ergies, medications, social and family history as documented unless otherwise noted below. For Physician Assistant/ Nurse Practitioner cases/documentation I have personally evaluated this patient and have completed at least one if not all key elements of the E/M (history, physical exam, and MDM). Additional findings are as noted. Sent from neurologist office for increasing fatigue weakness. No focal weakness. Just states last 2 days has been feeling very well. No specific complaints no recent illnesses no fevers no chest pain shortness of breath abdominal pain nausea vomiting or urinary complaints. Well-appearing on exam.  Nontoxic. Lungs clear. Heart normal.  M soft nontender. No focal neurodeficits.   Has a baseline tremor that he says is not worse than usual.  Will check labs, urine, EKG, reevaluate need for admission    EKG

## 2022-07-19 NOTE — PROGRESS NOTES
1-2cup coffee per day     Headache Medications  Current abortive meds: Tylenol  Current prophylactic meds:none  Previous abortive medications tried: Unknown  Previous prophylactic medications tried: gabapentin        Patient follows up with a neurologist Dr. Ananya Hall for the Parkinson's disease, currently taking Sinemet for that. Discussed with patient starting entacapone 200 mg 4 times a day with Sinemet and amitriptyline 10 mg nightly. Request was made for dr Manuel Nuno office for patient's previous records. He also has been referred to physical therapy. Plan to follow-up in 6 weeks. In between: ED visits for chest pain. Entecapone refilled. Followup 7/19/2022: Pt complained of persisting parkinson's symptoms and mild worsening with excessive drooling. Pt advised to take the medication with timings of 8am, 12 noon, 4 in the evening and 8 at night. Pt still complain of headache but has not worsens as compare to previous visits. Continue fiorcet and elavil.        PATIENT HISTORY:     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 ([address] 3/18/2011    d/t being hurt on the job    CKD (chronic kidney disease) stage 2, GFR 60-89 ml/min     CKD (chronic kidney disease) stage 3, GFR 30-59 ml/min ([facility])     Diverticulosis of colon     GERD (gastroesophageal reflux disease)     Impaired renal function     MRSA (methicillin resistant staph aureus) culture positive 9/23/2015    leg    Osteoarthritis of knee     Left    Parkinson disease ([address]     Proteinuria     Skull fracture ([address] 3/18/2011    d/t 12-foot drop on the job    Temporary loss of eyesight     periodically, happened x7    Tubular adenoma of colon 2012, 2017    [facility] [place]        Past Surgical History:   Procedure Laterality Date    COLONOSCOPY  11/02/2012    poor prep, tubular adenoma    COLONOSCOPY  09/19/2017    diverticulosis, multiple polyps as described, spot marking done, pathology-tubular adenoma x 4    NM COLSC FLX W/RMVL OF TUMOR POLYP LESION SNARE TQ N/A 9/19/2017    COLONOSCOPY POLYPECTOMY SNARE/COLD BIOPSY with tatooing and apc transverse colon performed by Shan Chavarria MD at [address] [place] [address] Right     rotator cuff        Social History     Socioeconomic History    Marital status: Single     Spouse name: Not on file    Number of children: Not on file    Years of education: Not on file    Highest education level: Not on file   Occupational History    Not on file   Tobacco Use    Smoking status: Former    Smokeless tobacco: Never   Vaping Use    Vaping Use: Never used   Substance and Sexual Activity    Alcohol use: No    Drug use: No    Sexual activity: Not Currently   Other Topics Concern    Not on file   Social History Narrative    Not on file     Social Determinants of Health     Financial Resource Strain: Low Risk     Difficulty of Paying Living Expenses: Not hard at all   Food Insecurity: No Food Insecurity    Worried About Running Out of Food in the Last Year: Never true    [address] in the Last Year: Never true   Transportation Needs: No Transportation Needs    Lack of Transportation (Medical): No    Lack of Transportation (Non-Medical): No   Physical Activity: Inactive    Days of Exercise per Week: 0 days    Minutes of Exercise per Session: 0 min   Stress: Stress Concern Present    Feeling of Stress : To some extent   Social Connections: Socially Isolated    Frequency of Communication with Friends and Family: Three times a week    Frequency of Social Gatherings with Friends and Family:  Three times a week    Attends Confucianist Services: Never    Active Member of Clubs or Organizations: No    Attends Club or Organization Meetings: Never    Marital Status:    Intimate Partner Violence: Not At Risk    Fear of Current or Ex-Partner: No    Emotionally Abused: No    Physically Abused: No    Sexually Abused: No   Housing Stability: Low Risk     Unable to Pay for Housing in the Last Year: No    Number of Places Lived in the Last Year: 1    Unstable Housing in the Last Year: No        Current Outpatient Medications   Medication Sig Dispense Refill    entacapone (COMTAN) 200 MG tablet TAKE ONE (1) TABLET BY MOUTH FOUR (4) TIMES DAILY WITH SINEMET 90 tablet 3    isosorbide mononitrate (IMDUR) 30 MG extended release tablet Take 1 tablet by mouth daily 30 tablet 3    metoprolol tartrate (LOPRESSOR) 25 MG tablet Take 0.5 tablets by mouth 2 times daily 60 tablet 3    nitroGLYCERIN (NITROSTAT) 0.4 MG SL tablet up to max of 3 total doses. If no relief after 1 dose, call 911. 25 tablet 0    magnesium oxide (MAG-OX) 400 (240 Mg) MG tablet TAKE 1 TABLET BY MOUTH ONCE DAILY 90 tablet 2    atorvastatin (LIPITOR) 40 MG tablet take 1 tablet by mouth once daily 90 tablet 3    diclofenac sodium (VOLTAREN) 1 % GEL Apply 2 g topically 2 times daily as needed for Pain (joint pain) 350 g 0    amitriptyline (ELAVIL) 10 MG tablet Take 1 tablet by mouth nightly 90 tablet 3    esomeprazole (NEXIUM) 20 MG delayed release capsule TAKE 1 CAPSULE BY MOUTH ONCE DAILY IN THE MORNING BEFORE BREAKFAST 90 capsule 1    rOPINIRole (REQUIP) 0.25 MG tablet TAKE ONE (1) TABLET BY MOUTH THREE (3) TIMES DAILY 90 tablet 5    clopidogrel (PLAVIX) 75 MG tablet TAKE 1 TABLET BY MOUTH ONCE DAILY  90 tablet 9    allopurinol (ZYLOPRIM) 100 MG tablet TAKE 1 TABLET BY MOUTH ONCE DAILY  90 tablet 9    acetaminophen (TYLENOL 8 HOUR ARTHRITIS PAIN) 650 MG extended release tablet Take 1 tablet by mouth every 8 hours as needed for Pain 90 tablet 5    butalbital-acetaminophen-caffeine (FIORICET, ESGIC) -40 MG per tablet Take 1 tab prn only for severe headache. Can repeat after 4 hrs if needed. Max 2 tabs/24 hrs. Max 4 tabs/week 28 tablet 3    gabapentin (NEURONTIN) 100 MG capsule Take 1 capsule by mouth 3 times daily for 30 days.  90 capsule 0    carbidopa-levodopa (SINEMET)  MG per tablet TAKE 1 TABLET BY MOUTH 4 TIMES DAILY 120 tablet 9 No current facility-administered medications for this visit. Allergies   Allergen Reactions    Dye [Iodides]      pain    Aspirin      Brain bleed          REVIEW OF SYSTEMS:     Review of Systems   Constitutional:  Positive for fatigue. HENT:  Positive for drooling. Negative for trouble swallowing and voice change. Excessive salivation   Eyes:  Negative for photophobia and visual disturbance. Respiratory:  Negative for chest tightness and shortness of breath. Cardiovascular:  Positive for chest pain. Gastrointestinal:  Negative for abdominal pain, diarrhea and vomiting. Endocrine: Negative for polydipsia and polyuria. Genitourinary:  Negative for enuresis and frequency. Musculoskeletal:  Positive for gait problem. Negative for myalgias and neck stiffness. Neurological:  Positive for speech difficulty, weakness and numbness. Negative for syncope, facial asymmetry and light-headedness. Psychiatric/Behavioral:  Negative for decreased concentration and suicidal ideas. VITALS  /70   Pulse 63   Ht 5' 7\" (1.702 m)   Wt 178 lb (80.7 kg)   SpO2 98%   BMI 27.88 kg/m²      PHYSICAL EXAMINATION:     Physical Exam     Constitutional: Well developed, well nourished and in no acute distress. Head:  normocephalic, atraumatic. Neck: supple, no carotid bruits, thyroid not palpable  Respiratory: Clear to auscultation bilaterally with no use of accessory muscles during respiration. Cardiovascular: normal rate, regular rhythm, no murmur, gallop, rub. Abdomen: Soft, nontender, nondistended, normal bowel sounds,    Extremities:  peripheral pulses palpable, no pedal edema or calf pain with palpation  Psych: normal affect      NEUROLOGICAL EXAMINATION:     Mental status   Awake, alert and oriented x 3. Mildly anxious. Speech hypophonic. No aphasia noticed.       Cranial nerves   II - visual fields intact to confrontation                                                III, IV, VI - extra-ocular muscles full: no pupillary defect; no MAIRA, no nystagmus, no ptosis   V - normal facial sensation                                                               VII - normal facial symmetry                                                             VIII - intact hearing                                                                             IX, X - symmetrical palate                                                                  XI - symmetrical shoulder shrug                                                       XII - midline tongue without atrophy or fasciculation     Motor function  Normal muscle bulk and increase tone and cogwheel rigidity in the b/l wrists. Muscle strength: normal power. RUE 4/5. LUE 4+/5. RLE 4+/5 and LLE 4+/5  Pill rolling resting tremor noticed in b/l hands (high amplitude and high frequency)     Sensory function Intact to touch, pin prick, vibration, proprioception in bilateral upper and lower extremities. Cerebellar Intact fine motor movement. Pill rolling resting tremor noticed in b/l hands (high amplitude and high frequency)     Reflex function Intact 2+ DTR and symmetric. Negative Babinski     Gait                  Slow gait with decrease arm swing. PRIOR TESTS AND IMAGING: Following images and Labs were reviewed by the examiner     CT head without contrast 8/13/2021:  Mild central and cortical cerebral atrophy with chronic deep white matter ischemic changes. MRI brain 8/12/2021 no acute intracranial process. Stable senescent changes including chronic small vessel disease and parenchymal volume loss      ASSESSMENT AND PLAN     Alf Lara is a 79 y.o. right handed  male with PMH significant for head trauma in March 2011 after he fell off from the ladder with a skull fracture along with subdural hematoma that was treated conservatively.   As per patient and previous notes he started having headache that got worse for last couple of months. Presented to clinic for follow-up for Parkinson's disease and post TBI headache. Parkinson's disease  Post concussive headache. PLAN:  - Continue sinemet 25/100 4 times. - Continue on entacapone 200 mg 4 times a day. - Amitriptyline 10 mg nightly. - Pt advised to take the medication with timings of 8am, 12 noon, 4 in the evening and 8 at night.  - Follow up in the clinic in 3 months  - Instructed patient to call the clinic if symptoms worsen or develop any new symptoms. Patient said that he feels tired and fatigued and would like to go to ED now to be seen. Transportation was arranged. He denied any chest pain or difficulty breathing. Come to clinic if symptoms worsen or develop any new symptoms. Mr. Inna Monroe received counseling on the following healthy behaviors: medical compliance, smoking cessation, blood pressure control, regular follow up with primary doctor. I have spent 60 minutes face to face with the patient more than 50% of this time was spent counseling and coordinating care.       Electronically signed by Kamran Landeros MD on 7/19/2022 at 2:25 PM

## 2022-07-19 NOTE — DISCHARGE INSTRUCTIONS
You have been seen in the ER today for weakness. If you begin to experience any symptoms such as chest pain shortness of breath nausea vomiting dizziness drowsiness abdominal pain loss of consciousness or any other symptoms you find concerning please return to the ED for follow-up evaluation. If you have been given medication please take them as prescribed for the pain. Do not take more medication than recommended at any given time. Please follow-up with your primary care provider within 5 to 7 days for continued care.

## 2022-07-19 NOTE — ED NOTES
Pt resting on stretcher. RR even and unlabored. No distress noted. Pt sates that he's having a \"shaking fit and hasn't taken his medication today. \" Resident ntfd that pt hasn't taken medication yet. Resident states that neuro will see him soon to order the medication. Call light within reach.      Lori Dinero RN  07/19/22 1923

## 2022-07-19 NOTE — ED PROVIDER NOTES
[facility] ED  Emergency Department  Emergency Medicine Sign-out     Care of Grace Medina was assumed from Dr. Jud Griffin and is being seen for Fatigue (Was at neurology appt. [place] weak)  . The patient's initial evaluation and plan have been discussed with the prior attending who initially evaluated the patient. EMERGENCY DEPARTMENT COURSE / MEDICAL DECISION MAKING:       MEDICATIONS GIVEN:  Orders Placed This Encounter   Medications    0.9 % sodium chloride bolus    entacapone (COMTAN) tablet 200 mg    carbidopa-levodopa (SINEMET)  MG per tablet 1 tablet       LABS / RADIOLOGY:     Labs Reviewed   CBC WITH AUTO DIFFERENTIAL - Abnormal; Notable for the following components:       Result Value    Hematocrit 40.3 (*)     Seg Neutrophils 68 (*)     Lymphocytes 21 (*)     All other components within normal limits   COMPREHENSIVE METABOLIC PANEL - Abnormal; Notable for the following components:    CREATININE 1.45 (*)     GFR Non- 47 (*)     GFR  56 (*)     All other components within normal limits   URINALYSIS WITH REFLEX TO CULTURE - Abnormal; Notable for the following components:    Specific Gravity, UA 1.004 (*)     All other components within normal limits   TROPONIN   TROPONIN       XR CHEST PORTABLE    Result Date: 7/19/2022  EXAMINATION: ONE XRAY VIEW OF THE CHEST 7/19/2022 2:15 pm COMPARISON: June 26, 2022. HISTORY: ORDERING SYSTEM PROVIDED HISTORY: weakness TECHNOLOGIST PROVIDED HISTORY: weakness FINDINGS: A portable upright frontal view chest radiograph was obtained. The heart is enlarged. The mediastinal contour and pleural spaces are otherwise within normal limits. The lungs are grossly clear. There is no focal consolidation or pneumothorax. The pulmonary vascular pattern is within normal limits. No acute thoracic osseous abnormality. Clear lungs. Cardiomegaly. No acute cardiopulmonary abnormality.      XR CHEST PORTABLE    Result Date: 6/26/2022  EXAMINATION: ONE XRAY VIEW OF THE CHEST 6/26/2022 11:25 pm COMPARISON: 09/01/2021 HISTORY: ORDERING SYSTEM PROVIDED HISTORY: CP TECHNOLOGIST PROVIDED HISTORY: CP Reason for Exam: Chest pains Additional signs and symptoms: Chest pains Relevant Medical/Surgical History: Chest pains FINDINGS: No focal consolidation, pleural effusion or pneumothorax. The cardiomediastinal silhouette is stable. No overt pulmonary edema. The osseous structures are stable. No acute cardiopulmonary findings. CT CHEST PULMONARY EMBOLISM W CONTRAST    Result Date: 6/27/2022  EXAMINATION: CTA OF THE CHEST 6/27/2022 2:58 am TECHNIQUE: CTA of the chest was performed after the administration of intravenous contrast.  Multiplanar reformatted images are provided for review. MIP images are provided for review. Automated exposure control, iterative reconstruction, and/or weight based adjustment of the mA/kV was utilized to reduce the radiation dose to as low as reasonably achievable. COMPARISON: None. HISTORY: ORDERING SYSTEM PROVIDED HISTORY: chest pain, positive d-dimer TECHNOLOGIST PROVIDED HISTORY: chest pain, positive d-dimer Decision Support Exception - unselect if not a suspected or confirmed emergency medical condition->Emergency Medical Condition (MA) Reason for Exam: chest pain, positive d-dimer FINDINGS: Pulmonary Arteries: Pulmonary arteries are adequately opacified for evaluation. No evidence of intraluminal filling defect to suggest pulmonary embolism. Main pulmonary artery is normal in caliber. Mediastinum: Cardiac chambers are not enlarged. There is no pericardial effusion or mediastinal hematoma. The thoracic aorta is not aneurysmal. Small noncalcified and partially calcified lymph nodes are present at the mediastinum and norman. No bulky lymphadenopathy is identified. Lungs/pleura: Some dependent and linear atelectasis in the posterior lungs. Few small calcified granulomas are also present.   There is no consolidation, pleural effusion, or pneumothorax. Some mild COPD changes in the upper lobes but without bulla. Upper Abdomen: Large calcified gallstone is present in the gallbladder but without dilatation of the gallbladder. The adrenal glands are not enlarged. No free air free fluid at the upper abdomen. Perinephric stranding appears symmetric suggesting chronic renal disease. Soft Tissues/Bones: Degenerative changes at the included shoulders. No acute displaced rib fractures. There is no soft tissue emphysema. No evidence of pulmonary embolism or acute pulmonary abnormality. Some dependent and linear atelectasis in the posterior lungs and mild COPD changes. Cholelithiasis is also noted. RECENT VITALS:     Temp: 97.8 °F (36.6 °C),  Heart Rate: 62, Resp: 20, BP: 105/83, SpO2: 95 %    This patient is a 79 y.o. Male with weakness, patient has Parkinson's disease, felt generalized weakness at the neurology clinic today, wanted to be evaluated in the ER. Patient has unremarkable labs, negative tropes, electrolytes normal, chest x-ray unremarkable. Patient was given a liter of fluids, still feels generally weak, neurology to evaluate. If neurology clears patient then patient may be discharged, if patient feels unsafe to go home, may be admitted to [facility] for placement. PT/OT. ED Course as of 07/19/22 2111 Tue Jul 19, 2022 1803 Downrending trop, baseline labs, no new Sx pt appopriate for dc [KK]   2111 Neurology evaluated the patient. States there is no reason for them to admit the patient at this time. Given the patient is unsafe to be able to go home. We will have the patient placed in the observation service for case management/PT/OT and possible placement. [ES]      ED Course User Index  [ES] Silas Morgan MD  [address] ,        OUTSTANDING TASKS / RECOMMENDATIONS:    Follow-up recommendations neurology  Reevaluated bedside  Dispo     FINAL IMPRESSION:     1.  General weakness DISPOSITION:         DISPOSITION:  []  Discharge   []  Transfer -    [x]  Admission -     []  Against Medical Advice   []  Eloped   FOLLOW-UP: Marisa Carlton, APRN - CNP  [address]  [address] [facility] [id number] [phone]          [facility]  [facility]  [phone]       DISCHARGE MEDICATIONS: Current Discharge Medication List              Shayy Callahan MD  Emergency Medicine Attending  [facility]       Shayy Callahan MD  Resident  07/19/22 [id number]

## 2022-07-19 NOTE — ED PROVIDER NOTES
[facility]  Emergency Department Encounter  EmergencyMedicine Resident     Pt Name:Mina Sin  MRN: [id number]  Haliet[place]fjosey 1940  Date of evaluation: 7/19/22  PCP:  JW Baker CNP    This patient was evaluated in the Emergency Department for symptoms described in the history of present illness. The patient was evaluated in the context of the global COVID-19 pandemic, which necessitated consideration that the patient might be at risk for infection with the SARS-CoV-2 virus that causes COVID-19. Institutional protocols and algorithms that pertain to the evaluation of patients at risk for COVID-19 are in a state of rapid change based on information released by regulatory bodies including the CDC and federal and state organizations. These policies and algorithms were followed during the patient's care in the ED. CHIEF COMPLAINT       Chief Complaint   Patient presents with    Fatigue     Was at neurology appt. [place] weak       HISTORY OF PRESENT ILLNESS  (Location/Symptom, Timing/Onset, Context/Setting, Quality, Duration, Modifying Factors, Severity.)      Donna Bryant is a 79 y.o. male who presents with generalized weakness. Patient had a cardiac catheterization on 6/8 which showed a patent stent. .  Patient is coming in today for generalized fatigue for about a week. Patient is not having any chest pain, he says that he is having some shortness of breath for about 1 day. Patient had a CT PE study completed on 6/27 which was negative. Patient has not had any fevers or chills, no nausea or vomiting, no abdominal pain. Patient does have a history of Parkinson's disease at baseline, he is currently living at a nursing facility due to inability to care for self, frequent falls. Patient has not fallen in the past week.     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 has a past surgical history that includes Colonoscopy (11/02/2012); shoulder surgery (Right); pr colsc flx w/rmvl of tumor polyp lesion snare tq (N/A, 9/19/2017); and Colonoscopy (09/19/2017). Social History     Socioeconomic History    Marital status: Single     Spouse name: Not on file    Number of children: Not on file    Years of education: Not on file    Highest education level: Not on file   Occupational History    Not on file   Tobacco Use    Smoking status: Former    Smokeless tobacco: Never   Vaping Use    Vaping Use: Never used   Substance and Sexual Activity    Alcohol use: No    Drug use: No    Sexual activity: Not Currently   Other Topics Concern    Not on file   Social History Narrative    Not on file     Social Determinants of Health     Financial Resource Strain: Low Risk     Difficulty of Paying Living Expenses: Not hard at all   Food Insecurity: No Food Insecurity    Worried About Running Out of Food in the Last Year: Never true    [address] in the Last Year: Never true   Transportation Needs: No Transportation Needs    Lack of Transportation (Medical): No    Lack of Transportation (Non-Medical): No   Physical Activity: Inactive    Days of Exercise per Week: 0 days    Minutes of Exercise per Session: 0 min   Stress: Stress Concern Present    Feeling of Stress : To some extent   Social Connections: Socially Isolated    Frequency of Communication with Friends and Family: Three times a week    Frequency of Social Gatherings with Friends and Family:  Three times a week    Attends Hindu Services: Never    Active Member of Clubs or Organizations: No    Attends Club or Organization Meetings: Never    Marital Status:    Intimate Partner Violence: Not At Risk    Fear of Current or Ex-Partner: No    Emotionally Abused: No    Physically Abused: No    Sexually Abused: No   Housing Stability: Low Risk     Unable to Pay for Housing in the Last Year: No    Number of Places Lived in the Last Year: 1    Unstable Housing in the Last Year: No       Family History   Problem Relation Age of Onset    No Known Problems Mother     No Known Problems Father        Allergies:  Dye [iodides] and Aspirin    Home Medications:  Prior to Admission medications    Medication Sig Start Date End Date Taking? Authorizing Provider   entacapone (COMTAN) 200 MG tablet TAKE ONE (1) TABLET BY MOUTH FOUR (4) TIMES DAILY WITH SINEMET 6/21/22   Nahum Agee MD   isosorbide mononitrate (IMDUR) 30 MG extended release tablet Take 1 tablet by mouth daily 6/21/22   Mordecai Medico, DO   metoprolol tartrate (LOPRESSOR) 25 MG tablet Take 0.5 tablets by mouth 2 times daily 6/20/22   Mordecai Medico, DO   gabapentin (NEURONTIN) 100 MG capsule Take 1 capsule by mouth 3 times daily for 30 days. 6/15/22 7/15/22  Page Riddle MD   nitroGLYCERIN (NITROSTAT) 0.4 MG SL tablet up to max of 3 total doses.  If no relief after 1 dose, call 911. 6/9/22   JW Upton NP   magnesium oxide (MAG-OX) 400 (240 Mg) MG tablet TAKE 1 TABLET BY MOUTH ONCE DAILY 5/23/22   JW Gold CNP   atorvastatin (LIPITOR) 40 MG tablet take 1 tablet by mouth once daily 5/17/22   JW Gold CNP   diclofenac sodium (VOLTAREN) 1 % GEL Apply 2 g topically 2 times daily as needed for Pain (joint pain) 5/9/22   JW Bull NP   amitriptyline (ELAVIL) 10 MG tablet Take 1 tablet by mouth nightly 4/5/22   Jayy Thomson MD   esomeprazole (NEXIUM) 20 MG delayed release capsule TAKE 1 CAPSULE BY MOUTH ONCE DAILY IN THE MORNING BEFORE BREAKFAST 2/23/22   JW Dhillon CNP   rOPINIRole (REQUIP) 0.25 MG tablet TAKE ONE (1) TABLET BY MOUTH THREE (3) TIMES DAILY 2/23/22   JW Dhillon CNP   carbidopa-levodopa (SINEMET)  MG per tablet TAKE 1 TABLET BY MOUTH 4 TIMES DAILY 12/3/21 3/18/22  JW Hillman CNP   clopidogrel (PLAVIX) 75 MG tablet TAKE 1 TABLET BY MOUTH ONCE DAILY  11/5/21   JW Hillman CNP   allopurinol (ZYLOPRIM) 100 MG tablet TAKE 1 TABLET BY MOUTH ONCE DAILY  11/5/21   JW Hillman CNP   acetaminophen (TYLENOL 8 HOUR ARTHRITIS PAIN) 650 MG extended release tablet Take 1 tablet by mouth every 8 hours as needed for Pain 6/14/21   JW Dhillon CNP   butalbital-acetaminophen-caffeine (FIORICET, ESGIC) -40 MG per tablet Take 1 tab prn only for severe headache. Can repeat after 4 hrs if needed. Max 2 tabs/24 hrs. Max 4 tabs/week 6/14/21   JW Dhillon CNP       REVIEW OF SYSTEMS    (2-9 systems for level 4, 10 or more for level 5)      Review of Systems   Constitutional:  Positive for fatigue. Negative for chills and fever. HENT:  Negative for congestion and trouble swallowing. Eyes:  Negative for visual disturbance. Respiratory:  Negative for cough, chest tightness and shortness of breath. Gastrointestinal:  Negative for abdominal pain, diarrhea, nausea and vomiting. Endocrine: Negative for polyuria. Genitourinary:  Negative for dysuria, flank pain, hematuria and urgency. Musculoskeletal:  Negative for back pain and myalgias. Skin:  Negative for color change. Allergic/Immunologic: Negative for immunocompromised state. Neurological:  Positive for weakness. Negative for dizziness, syncope, light-headedness and headaches.      PHYSICAL EXAM   (up to 7 for level 4, 8 or more for level 5)      INITIAL VITALS:   BP (!) 103/58   Pulse 59   Temp 97.5 °F (36.4 °C) (Oral)   Resp 18   Ht 5' 7.01\" (1.702 m)   Wt 178 lb 1.6 oz (80.8 kg) SpO2 98%   BMI 27.89 kg/m²     Physical Exam  Constitutional:       Appearance: He is well-developed. HENT:      Head: Normocephalic and atraumatic. Nose: Nose normal.      Mouth/Throat:      Mouth: Mucous membranes are moist.   Eyes:      Conjunctiva/sclera: Conjunctivae normal.   Cardiovascular:      Rate and Rhythm: Normal rate and regular rhythm. Heart sounds: Normal heart sounds. No murmur heard. No friction rub. Pulmonary:      Effort: Pulmonary effort is normal. No respiratory distress. Breath sounds: Normal breath sounds. No rales. Chest:      Chest wall: No tenderness. Abdominal:      General: Abdomen is flat. Palpations: Abdomen is soft. There is no hepatomegaly, splenomegaly or pulsatile mass. Tenderness: There is no abdominal tenderness. Hernia: No hernia is present. Skin:     General: Skin is warm. Capillary Refill: Capillary refill takes less than 2 seconds. Neurological:      General: No focal deficit present. Mental Status: He is alert and oriented to person, place, and time. Mental status is at baseline. Cranial Nerves: No cranial nerve deficit. Sensory: No sensory deficit. Motor: No weakness. DIFFERENTIAL  DIAGNOSIS     PLAN (LABS / IMAGING / EKG):  Orders Placed This Encounter   Procedures    COVID-19, Rapid    XR CHEST PORTABLE    CBC with Auto Differential    Comprehensive Metabolic Panel    Troponin    Urinalysis with Reflex to Culture    ADULT DIET;  Regular    Vital signs per unit routine    Notify physician    Notify physician    Up with assistance    Notify Home Health    Full Code    Inpatient Consult to Neurology    Inpatient consult to Case Management    Inpatient consult to Home Care Needs    OT eval and treat    PT eval and treat    EKG 12 Lead    Place in Observation Service       MEDICATIONS ORDERED:  Orders Placed This Encounter   Medications    0.9 % sodium chloride bolus    entacapone (COMTAN) tablet 200 mg    DISCONTD: carbidopa-levodopa (SINEMET)  MG per tablet 1 tablet    allopurinol (ZYLOPRIM) tablet 100 mg    amitriptyline (ELAVIL) tablet 10 mg    atorvastatin (LIPITOR) tablet 40 mg    carbidopa-levodopa (SINEMET)  MG per tablet 1 tablet    clopidogrel (PLAVIX) tablet 75 mg    pantoprazole (PROTONIX) tablet 40 mg    gabapentin (NEURONTIN) capsule 100 mg    isosorbide mononitrate (IMDUR) extended release tablet 30 mg    DISCONTD: metoprolol tartrate (LOPRESSOR) tablet 12.5 mg    rOPINIRole (REQUIP) tablet 0.25 mg    sodium chloride flush 0.9 % injection 5-40 mL    sodium chloride flush 0.9 % injection 5-40 mL    0.9 % sodium chloride infusion    acetaminophen (TYLENOL) tablet 650 mg    OR Linked Order Group     ondansetron (ZOFRAN-ODT) disintegrating tablet 4 mg     ondansetron (ZOFRAN) injection 4 mg    magnesium sulfate 1000 mg in dextrose 5% 100 mL IVPB       DDX:  Transverse myelitis, Myasthenia gravis, cardiac, anemia, electrolytes, infection, change in medications, hypothyroid, rheumatalgic, depression, dehydration      DIAGNOSTIC RESULTS / EMERGENCY DEPARTMENT COURSE / MDM   LAB RESULTS:  Results for orders placed or performed during the hospital encounter of 07/19/22   COVID-19, Rapid    Specimen: Nasopharyngeal Swab   Result Value Ref Range    Specimen Description . NASOPHARYNGEAL SWAB     SARS-CoV-2, Rapid Not Detected Not Detected   CBC with Auto Differential   Result Value Ref Range    WBC 7.1 3.5 - 11.3 k/uL    RBC 4.30 4.21 - 5.77 m/uL    Hemoglobin 13.9 13.0 - 17.0 g/dL    Hematocrit 40.3 (L) 40.7 - 50.3 %    MCV 93.7 82.6 - 102.9 fL    MCH 32.3 25.2 - 33.5 pg    MCHC 34.5 28.4 - 34.8 g/dL    RDW 14.3 11.8 - 14.4 %    Platelets 347 931 - 483 k/uL    MPV 8.9 8.1 - 13.5 fL    NRBC Automated 0.0 0.0 per 100 WBC    Seg Neutrophils 68 (H) 36 - 65 %    Lymphocytes 21 (L) 24 - 43 %    Monocytes 10 3 - 12 %    Eosinophils % 1 1 - 4 %    Basophils 0 0 - 2 %    Immature Granulocytes 0 0 %    Segs Absolute 4.84 1.50 - 8.10 k/uL    Absolute Lymph # 1.46 1.10 - 3.70 k/uL    Absolute Mono # 0.69 0.10 - 1.20 k/uL    Absolute Eos # 0.07 0.00 - 0.44 k/uL    Basophils Absolute 0.03 0.00 - 0.20 k/uL    Absolute Immature Granulocyte <0.03 0.00 - 0.30 k/uL   Comprehensive Metabolic Panel   Result Value Ref Range    Glucose 95 70 - 99 mg/dL    BUN 19 8 - 23 mg/dL    Creatinine 1.45 (H) 0.70 - 1.20 mg/dL    Calcium 9.8 8.6 - 10.4 mg/dL    Sodium 140 135 - 144 mmol/L    Potassium 4.9 3.7 - 5.3 mmol/L    Chloride 102 98 - 107 mmol/L    CO2 26 20 - 31 mmol/L    Anion Gap 12 9 - 17 mmol/L    Alkaline Phosphatase 110 40 - 129 U/L    ALT 25 5 - 41 U/L    AST 25 <40 U/L    Total Bilirubin 0.50 0.3 - 1.2 mg/dL    Total Protein 7.3 6.4 - 8.3 g/dL    Albumin 4.4 3.5 - 5.2 g/dL    Albumin/Globulin Ratio 1.5 1.0 - 2.5    GFR Non- 47 (L) >60 mL/min    GFR  56 (L) >60 mL/min    GFR Comment         Troponin   Result Value Ref Range    Troponin, High Sensitivity 22 0 - 22 ng/L   Troponin   Result Value Ref Range    Troponin, High Sensitivity 18 0 - 22 ng/L   Urinalysis with Reflex to Culture    Specimen: Urine   Result Value Ref Range    Color, UA Yellow Yellow    Turbidity UA Clear Clear    Glucose, Ur NEGATIVE NEGATIVE    Bilirubin Urine NEGATIVE NEGATIVE    Ketones, Urine NEGATIVE NEGATIVE    Specific Gravity, UA 1.004 (L) 1.005 - 1.030    Urine Hgb NEGATIVE NEGATIVE    pH, UA 6.5 5.0 - 8.0    Protein, UA NEGATIVE NEGATIVE    Urobilinogen, Urine Normal Normal    Nitrite, Urine NEGATIVE NEGATIVE    Leukocyte Esterase, Urine NEGATIVE NEGATIVE    Urinalysis Comments       Microscopic exam not performed based on chemical results unless requested in original order.    EKG 12 Lead   Result Value Ref Range    Ventricular Rate 61 BPM    Atrial Rate 61 BPM    P-R Interval 186 ms    QRS Duration 104 ms    Q-T Interval 430 ms    QTc Calculation (Bazett) 432 ms    P Axis 51 degrees    R Axis 40 degrees    T Axis 12 degrees         RADIOLOGY:  XR CHEST PORTABLE    Result Date: 7/19/2022  Clear lungs. Cardiomegaly. No acute cardiopulmonary abnormality. XR CHEST PORTABLE    Result Date: 6/26/2022  No acute cardiopulmonary findings. CT CHEST PULMONARY EMBOLISM W CONTRAST    Result Date: 6/27/2022  No evidence of pulmonary embolism or acute pulmonary abnormality. Some dependent and linear atelectasis in the posterior lungs and mild COPD changes. Cholelithiasis is also noted. EKG  No ST elevations or depressions, unchanged from previous EKG, no evidence of ischemia. Nonacute EKG. EMERGENCY DEPARTMENT COURSE:  ED Course as of 07/20/22 2108   Tue Jul 19, 2022   1803 Downrending trop, baseline labs, no new Sx pt appopriate for dc [KK]   2111 Neurology evaluated the patient. States there is no reason for them to admit the patient at this time. Given the patient is unsafe to be able to go home. We will have the patient placed in the observation service for case management/PT/OT and possible placement. [ES]      ED Course User Index  [ES] Joel Eid MD  [facility]           CONSULTS:  IP CONSULT TO NEUROLOGY  IP CONSULT TO CASE MANAGEMENT  IP CONSULT TO HOME CARE NEEDS        Assessment/Plan: 79-year-old male presenting from neurology clinic for generalized weakness. Patient has baseline Parkinson's disease for which she is being followed by neurology. Patient simply \"did not feel well\" and wanted to be evaluated in the ER. Unremarkable lab work, tropes at baseline, electrolytes normal, no evidence of urine infection. Patient evaluated by neurology, recommended outpatient follow-up, home care. Case management consulted for home care needs, no further invention indicated this time, patient is hemodynamically stable appropriate for discharge and outpatient follow-up. Patient was given ER return precautions. FINAL IMPRESSION      1.  General weakness          DISPOSITION / PLAN

## 2022-07-19 NOTE — PATIENT INSTRUCTIONS
Please take sinemat and entecapone 8am, 12 noon, 4 in the afternoon and 8 in the night. Please use your phone too set alarm for the medication.

## 2022-07-19 NOTE — CARE COORDINATION
Patient called this morning concerned about his transportation. HC reminded the patient will pick him up @ 11:50a. Patient's appointment is @ 1p today @ [facility]/Neurology.       Plan of Care  [facility]. will follow up for patient for his next appointment

## 2022-07-20 LAB
EKG ATRIAL RATE: 61 BPM
EKG P AXIS: 51 DEGREES
EKG P-R INTERVAL: 186 MS
EKG Q-T INTERVAL: 430 MS
EKG QRS DURATION: 104 MS
EKG QTC CALCULATION (BAZETT): 432 MS
EKG R AXIS: 40 DEGREES
EKG T AXIS: 12 DEGREES
EKG VENTRICULAR RATE: 61 BPM

## 2022-07-20 PROCEDURE — 97166 OT EVAL MOD COMPLEX 45 MIN: CPT

## 2022-07-20 PROCEDURE — G0378 HOSPITAL OBSERVATION PER HR: HCPCS

## 2022-07-20 PROCEDURE — 97162 PT EVAL MOD COMPLEX 30 MIN: CPT

## 2022-07-20 PROCEDURE — 6370000000 HC RX 637 (ALT 250 FOR IP): Performed by: EMERGENCY MEDICINE

## 2022-07-20 PROCEDURE — 97530 THERAPEUTIC ACTIVITIES: CPT

## 2022-07-20 PROCEDURE — 97112 NEUROMUSCULAR REEDUCATION: CPT

## 2022-07-20 PROCEDURE — 96365 THER/PROPH/DIAG IV INF INIT: CPT

## 2022-07-20 PROCEDURE — 99218 PR INITIAL OBSERVATION CARE/DAY 30 MINUTES: CPT | Performed by: PSYCHIATRY & NEUROLOGY

## 2022-07-20 PROCEDURE — 6370000000 HC RX 637 (ALT 250 FOR IP): Performed by: STUDENT IN AN ORGANIZED HEALTH CARE EDUCATION/TRAINING PROGRAM

## 2022-07-20 PROCEDURE — 6360000002 HC RX W HCPCS: Performed by: STUDENT IN AN ORGANIZED HEALTH CARE EDUCATION/TRAINING PROGRAM

## 2022-07-20 PROCEDURE — 97116 GAIT TRAINING THERAPY: CPT

## 2022-07-20 PROCEDURE — 97535 SELF CARE MNGMENT TRAINING: CPT

## 2022-07-20 PROCEDURE — 2580000003 HC RX 258: Performed by: EMERGENCY MEDICINE

## 2022-07-20 PROCEDURE — 93010 ELECTROCARDIOGRAM REPORT: CPT | Performed by: INTERNAL MEDICINE

## 2022-07-20 RX ORDER — MAGNESIUM SULFATE 1 G/100ML
1000 INJECTION INTRAVENOUS ONCE
Status: COMPLETED | OUTPATIENT
Start: 2022-07-20 | End: 2022-07-20

## 2022-07-20 RX ADMIN — AMITRIPTYLINE HYDROCHLORIDE 10 MG: 10 TABLET, FILM COATED ORAL at 00:25

## 2022-07-20 RX ADMIN — METOPROLOL TARTRATE 12.5 MG: 25 TABLET ORAL at 00:25

## 2022-07-20 RX ADMIN — CARBIDOPA AND LEVODOPA 1 TABLET: 25; 100 TABLET ORAL at 13:41

## 2022-07-20 RX ADMIN — CARBIDOPA AND LEVODOPA 1 TABLET: 25; 100 TABLET ORAL at 00:25

## 2022-07-20 RX ADMIN — CARBIDOPA AND LEVODOPA 1 TABLET: 25; 100 TABLET ORAL at 18:26

## 2022-07-20 RX ADMIN — AMITRIPTYLINE HYDROCHLORIDE 10 MG: 10 TABLET, FILM COATED ORAL at 20:12

## 2022-07-20 RX ADMIN — ENTACAPONE 200 MG: 200 TABLET, FILM COATED ORAL at 08:52

## 2022-07-20 RX ADMIN — SODIUM CHLORIDE, PRESERVATIVE FREE 10 ML: 5 INJECTION INTRAVENOUS at 08:53

## 2022-07-20 RX ADMIN — ENTACAPONE 200 MG: 200 TABLET, FILM COATED ORAL at 13:40

## 2022-07-20 RX ADMIN — ROPINIROLE HYDROCHLORIDE 0.25 MG: 0.25 TABLET, FILM COATED ORAL at 20:12

## 2022-07-20 RX ADMIN — PANTOPRAZOLE SODIUM 40 MG: 40 TABLET, DELAYED RELEASE ORAL at 08:53

## 2022-07-20 RX ADMIN — DESMOPRESSIN ACETATE 40 MG: 0.2 TABLET ORAL at 08:53

## 2022-07-20 RX ADMIN — ENTACAPONE 200 MG: 200 TABLET, FILM COATED ORAL at 18:27

## 2022-07-20 RX ADMIN — GABAPENTIN 100 MG: 100 CAPSULE ORAL at 20:12

## 2022-07-20 RX ADMIN — ISOSORBIDE MONONITRATE 30 MG: 30 TABLET ORAL at 08:53

## 2022-07-20 RX ADMIN — CARBIDOPA AND LEVODOPA 1 TABLET: 25; 100 TABLET ORAL at 08:52

## 2022-07-20 RX ADMIN — GABAPENTIN 100 MG: 100 CAPSULE ORAL at 08:52

## 2022-07-20 RX ADMIN — ROPINIROLE HYDROCHLORIDE 0.25 MG: 0.25 TABLET, FILM COATED ORAL at 00:25

## 2022-07-20 RX ADMIN — SODIUM CHLORIDE, PRESERVATIVE FREE 10 ML: 5 INJECTION INTRAVENOUS at 23:33

## 2022-07-20 RX ADMIN — MAGNESIUM SULFATE HEPTAHYDRATE 1000 MG: 1 INJECTION, SOLUTION INTRAVENOUS at 14:38

## 2022-07-20 RX ADMIN — CLOPIDOGREL 75 MG: 75 TABLET, FILM COATED ORAL at 08:52

## 2022-07-20 RX ADMIN — ENTACAPONE 200 MG: 200 TABLET, FILM COATED ORAL at 20:12

## 2022-07-20 RX ADMIN — ALLOPURINOL 100 MG: 100 TABLET ORAL at 08:52

## 2022-07-20 RX ADMIN — CARBIDOPA AND LEVODOPA 1 TABLET: 25; 100 TABLET ORAL at 20:12

## 2022-07-20 RX ADMIN — GABAPENTIN 100 MG: 100 CAPSULE ORAL at 00:25

## 2022-07-20 RX ADMIN — GABAPENTIN 100 MG: 100 CAPSULE ORAL at 13:40

## 2022-07-20 ASSESSMENT — ENCOUNTER SYMPTOMS
COLOR CHANGE: 0
CHEST TIGHTNESS: 0
COUGH: 0
ABDOMINAL PAIN: 0
VOMITING: 0
BACK PAIN: 0
TROUBLE SWALLOWING: 0
SHORTNESS OF BREATH: 1
DIARRHEA: 0
SHORTNESS OF BREATH: 0
NAUSEA: 0

## 2022-07-20 NOTE — ED NOTES
Report given to Elijah Linares RN. Opportunity for questions given.      Zachary Villa RN  07/19/22 1416

## 2022-07-20 NOTE — CARE COORDINATION
07/20/22 1248   Service Assessment   Patient Orientation Alert and Oriented   Cognition Alert   History Provided By Patient   Primary Caregiver Self   Support Systems Children;Family Members   PCP Verified by CM Yes   Last Visit to PCP Within last 3 months   Prior Functional Level Independent in ADLs/IADLs   Current Functional Level Independent in ADLs/IADLs   Can patient return to prior living arrangement No  (referral to [facility] as per choice)   Ability to make needs known: Good   Family able to assist with home care needs: No   Would you like for me to discuss the discharge plan with any other family members/significant others, and if so, who? No   Social/Functional History   Lives With Alone   Type of [address] [place] [address] Two level; Able to Live on Main level with bedroom/bathroom; Laundry in basement   Home Access Stairs to enter with rails   Entrance Stairs - Number of Steps 5STE, full flight 2nd RR  floor primarily used full flight to basement bilateral railing   Entrance Stairs - Rails Both   Bathroom Shower/Tub Walk-in shower   [address] [place] [address] Dr fajardo   [address] Cane;Walker, rolling  (has walker does not use)   Receives Help From Family;Home health;Neighbor;Friend(s)   ADL Assistance Independent   Homemaking Assistance Independent   Meal Prep Responsibility Primary   Laundry Responsibility Primary   Cleaning Responsibility Primary   Ambulation Assistance Independent   Transfer Assistance Independent   Active  Yes   Mode of Transportation Car   Occupation Retired   Type of Occupation ,  at [address]   Discharge [address] Prior To Admission None   Potential Assistance Needed N/A   DME Ordered?  No   Potential Assistance Purchasing Medications No   Meds-to-Beds: Does the patient want to have any new prescriptions delivered to bedside prior to discharge? No   Type of Home Care Services   (pt states a nurse comes to his home along with therapy but he does not know the provider)   One/Two Story Residence Two story   [employer] Both   Lift Chair Available No   History of falls? 0   Services At/After Discharge   Transition of Care Consult (CM Consult) SNF   Mode of Transport at Discharge   (will need)   Condition of Participation: Discharge Planning   The Plan for Transition of Care is related to the following treatment goals: Referral to [facility], relates has home care with pt but does not know provider, SNF list provided for additional choices if needed   The Patient and/or Patient Representative was provided with a Choice of Provider?  Patient  (choice is [facility], list provided for additional choices if needed)

## 2022-07-20 NOTE — DISCHARGE INSTR - COC
([id number]) 09/25/2012, 12/04/2015       Active Problems:  Patient Active Problem List   Diagnosis Code    Temporary loss of eyesight H53.129    Impaired renal function N28.9    Syncope : posterior circulation stroke vs Neurocardiogenic syncope  R55    Intracranial bleed : traumatic left sub-dural hematoma ,managed conservatively in 2011 I62.9    Headache R51.9    CKD (chronic kidney disease) stage 3, GFR 30-59 ml/min ([facility]) N18.30    Depression F32. A    Hyperlipidemia E78.5    Microhematuria R31.29    Microalbuminuria R80.9    Essential hypertension I10    Generalized abdominal pain R10.84    Tubular adenoma of colon D12.6    Diverticulosis of colon K57.30    History of skull fracture Z87.81    Nausea R11.0    Pure hypercholesterolemia E78.00    Prediabetes R73.03    Parkinson disease ([facility] [address] G20    Chronic post-traumatic headache, not intractable G44.329    Fall (on) (from) other stairs and steps, initial encounter W10. 8XXA    Strain of iliopsoas muscle, initial encounter S76.919A    Chronic pain of left knee M25.562, G89.29    Closed fracture of second toe of right foot S92.501A    Closed fracture of second toe of left foot S92.502A    Abnormal ECG R94.31    Cerebrovascular accident [facility]) I63.9    Chest pain, unspecified R07.9    Hematoma of scalp S00. 03XA    Left ventricular hypertrophy I51.7    Mild aortic valve regurgitation I35.1    Pulmonary hypertension, mild ([facility]) I27.20    Lumbar radiculopathy M54.16    Dizziness R42    Hyponatremia E87.1    Vomiting R11.10    General weakness R53.1    Overweight (BMI 25.0-29. 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    Chronic renal disease, stage III [facility]) [[id number]] N18.30    Chronic pain of multiple joints M25.50, G89.29    Multiple comorbid conditions R69    Gout M10.9    Nerve pain M79.2    NSTEMI (non-ST elevated myocardial infarction) (Former[facility]) I21.4    History of subdural hematoma Z86.79    Coronary artery disease involving native heart with angina pectoris ([facility]) I25.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    Lumbar facet arthropathy M47.816    Spinal stenosis of lumbar region without neurogenic claudication M48.061    Generalized weakness R53.1       Isolation/Infection:   Isolation            No Isolation          Patient Infection Status       Infection Onset Added Last Indicated Last Indicated By Review Planned Expiration Resolved Resolved By    MRSA  09/25/15 09/25/15 Juvenal Wakefield RN        Leg 9/23/15    Resolved    COVID-19 (Rule Out) 09/01/21 09/01/21 09/01/21 COVID-19, Rapid (Ordered)   09/01/21 Rule-Out Test Resulted            Nurse Assessment:  Last Vital Signs: BP (!) 94/58   Pulse 51   Temp 97.9 °F (36.6 °C) (Oral)   Resp 16   Ht 5' 7.01\" (1.702 m)   Wt 178 lb 1.6 oz (80.8 kg)   SpO2 95%   BMI 27.89 kg/m²     Last documented pain score (0-10 scale): Pain Level: 1  Last Weight:   Wt Readings from Last 1 Encounters:   07/20/22 178 lb 1.6 oz (80.8 kg)     Mental Status:  oriented and alert    IV Access:  - None    Nursing Mobility/ADLs:  Walking   Assisted  Transfer  Assisted  Bathing  Assisted  Dressing  Assisted  Toileting  Assisted  Feeding  Independent  Med Admin  Independent  Med Delivery   whole    Wound Care Documentation and Therapy:        Elimination:  Continence: Bowel: Yes  Bladder: Yes  Urinary Catheter: None   Colostomy/Ileostomy/Ileal Conduit: No       Date of Last BM: 7/20/2022    Intake/Output Summary (Last 24 hours) at 7/20/2022 1618  Last data filed at 7/20/2022 0030  Gross per 24 hour   Intake 240 ml   Output 400 ml   Net -160 ml     I/O last 3 completed shifts: In: 240 [P.O.:240]  Out: 1000 [Urine:1000]    Safety Concerns:     History of Falls (last 30 days) and At Risk for Falls    Impairments/Disabilities:      None    Nutrition Therapy:  Current Nutrition Therapy:   - Oral Diet:  General    Routes of Feeding: Oral  Liquids:  Thin Liquids  Daily Fluid Restriction: no  Last Modified Barium Swallow with Video (Video Swallowing Test): not done    Treatments at the Time of Hospital Discharge:   Respiratory Treatments: none  Oxygen Therapy:  is not on home oxygen therapy. Ventilator:    - No ventilator support    Rehab Therapies: Physical Therapy and Occupational Therapy  Weight Bearing Status/Restrictions: No weight bearing restrictions  Other Medical Equipment (for information only, NOT a DME order):  cane  Other Treatments: none    Patient's personal belongings (please select all that are sent with patient):  Glasses    RN SIGNATURE:  Electronically signed by Sherre Fothergill, RN on 7/21/22 at 4:15 PM EDT    CASE MANAGEMENT/SOCIAL WORK SECTION    Inpatient Status Date: ***    Readmission Risk Assessment Score:  Readmission Risk              Risk of Unplanned Readmission:  0           Discharging to Facility/ Agency   Name: [facility]   Address: [place] [id number]  Phone: [phone]  Fax:    Dialysis Facility (if applicable)   Name:  Address:  Dialysis Schedule:  Phone:  Fax:    / signature: Electronically signed by Remington Thacker RN on 7/21/22 at 4:08 PM EDT    PHYSICIAN SECTION    Prognosis: Fair    Condition at Discharge: Stable    Rehab Potential (if transferring to Rehab): Fair    Recommended Labs or Other Treatments After Discharge: PT OT eval and treat    Physician Certification: I certify the above information and transfer of Juan C Millan  is necessary for the continuing treatment of the diagnosis listed and that he requires [facility] for less than 30 days.      Update Admission H&P: No change in H&P    PHYSICIAN SIGNATURE:  Electronically signed by Dewayne Felix MD on 7/21/22 at 7:27 AM EDT

## 2022-07-20 NOTE — ED NOTES
Pt provided justin crackers. A&O. RR even and unlabored. No distress noted. Call light within reach.       Bubba Butt RN  07/19/22 2100

## 2022-07-20 NOTE — PROGRESS NOTES
Assessment: Patient was reclining in his chair when  visited. Family was not present at the time. However, patient made mention of his four children. When asked how he was feeling, patient responded; \"good. \" Patient was raised [employer]. Patient received sacrament of anointing of the sick. Intervention:  provided presence, offered support and prayed with patient. Outcome: Patient expressed appreciation for the anointing he received. Plan: Follow up visits recommended for ongoing assessment of patient's condition and for more prayers and support.

## 2022-07-20 NOTE — PROGRESS NOTES
Physical Therapy  Facility/Department: [facility] MED SURG  Physical Therapy Initial Assessment    Name: Martha Hutchins  : 1940  MRN: [id number]  Date of Service: 2022  Chief Complaint   Patient presents with    Fatigue     Was at neurology appt. [place] weak      Discharge Recommendations:  Patient would benefit from continued therapy after discharge, Therapy recommended at discharge   PT Equipment Recommendations  Equipment Needed: No      Patient Diagnosis(es): The encounter diagnosis was General weakness. 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 Past Surgical History:  has a past surgical history that includes Colonoscopy (2012); shoulder surgery (Right); pr colsc flx w/rmvl of tumor polyp lesion snare tq (N/A, 2017); and Colonoscopy (2017). Assessment   Body Structures, Functions, Activity Limitations Requiring Skilled Therapeutic Intervention: Decreased functional mobility ; Decreased body mechanics; Decreased strength;Decreased safe awareness;Decreased endurance  Assessment: Pt presents with baseline Parkinsons, advanced age and complaint of general weakness, able to complete bed mobitly and stand from sitting with supervision/CGA, ambulating is limited due to light headed feeling and need to sit. Pt will continue to benefit from PT to progress activity and promote safety and functional independence.   Therapy Prognosis: Good  Decision Making: Medium Complexity  Clinical Presentation: evolving  Requires PT Follow-Up: Yes  Activity Tolerance  Activity Tolerance: Patient limited by fatigue;Patient limited by endurance; Other (comment)  Activity Tolerance Comments: c/o light headness     Plan   Plan  Plan:  (5-6x/wk)  Current Treatment Recommendations: Strengthening, Balance training, Functional mobility training, Transfer training, Endurance training, Gait training, Stair training, Neuromuscular re-education, Therapeutic activities  Safety Devices  Type of Devices: Nurse notified, Left in chair, Gait belt, Chair alarm in place, Call light within reach  Restraints  Restraints Initially in Place: No     Restrictions  Restrictions/Precautions  Restrictions/Precautions: Fall Risk, General Precautions, Contact Precautions  Required Braces or Orthoses?: No  Position Activity Restriction  Other position/activity restrictions:  Up with assistance, Parkinson's     Subjective   Pain: pt reports 7/10 in L knee/hip  General  Chart Reviewed: Yes  Patient assessed for rehabilitation services?: Yes  Additional Pertinent Hx: malaiase, weakness  Response To Previous Treatment: Not applicable  Family / Caregiver Present: No  Follows Commands: Within Functional Limits  Other (Comment): Pt awake and alert in agreement with POC  General Comment  Comments: Okay per RN to see  Subjective  Subjective: Pt complaint of weakness and dizziness         Social/Functional History  Social/Functional History  Lives With: Alone  Type of Home: House  Home Layout: Two level, Able to Live on Main level with bedroom/bathroom, Laundry in basement  Home Access: Stairs to enter with rails  Entrance Stairs - Number of Steps: 5STE, full flight 2nd RR  floor primarily used full flight to basement bilateral railing  Entrance Stairs - Rails: Both  Bathroom Shower/Tub: Walk-in shower, Curtain  Bathroom Toilet: Standard  Bathroom Equipment: Shower chair  Bathroom Accessibility: Accessible  Home Equipment: Phan Merrill, [address] [facility] bed  Has the patient had two or more falls in the past year or any fall with injury in the past year?: Yes  Receives Help From: Family, Home health, Neighbor, Friend(s)  ADL Assistance: Independent  Homemaking Assistance: Independent  Homemaking Responsibilities: Yes  Meal Prep Responsibility: Primary  Laundry Responsibility: Primary  Cleaning Responsibility: Primary  Bill Paying/Finance Responsibility: Primary  Shopping Responsibility: Primary  Dependent Care Responsibility: Primary  Health Care Management: Primary  Ambulation Assistance: Independent  Transfer Assistance: Independent  Active : Yes  Mode of Transportation: Car  Occupation: Retired  Type of Occupation: ,  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  Additional Comments: pt reports home health would come 2-3x/wk to assist with exercises and mobility; 2 sons live locally to assist as needed  Vision/Hearing  Vision  Vision: Impaired  Vision Exceptions: Wears glasses at all times; Wears glasses for distance  Tracking: Able to track stimulus in all quads w/o difficulty  Hearing  Hearing: Within functional limits    Cognition   Orientation  Overall Orientation Status: Within Functional Limits  Cognition  Overall Cognitive Status: Exceptions  Following Commands: Follows one step commands consistently  Attention Span: Attends with cues to redirect  Safety Judgement: Decreased awareness of need for safety;Decreased awareness of need for assistance  Problem Solving: Assistance required to identify errors made;Assistance required to correct errors made  Initiation: Requires cues for some  Sequencing: Requires cues for some     Objective   Heart Rate: 51  Heart Rate Source: Monitor  BP: (!) 94/58  MAP (Calculated): 70  Resp: 16  SpO2: 95 %  O2 Device: None (Room air)     Observation/Palpation  Posture: Good  Observation: NORMAL POSTURE  Gross Assessment  AROM: Within functional limits  PROM: Within functional limits  Strength:  Within functional limits  Coordination: Within functional limits  Tone: Normal  Sensation: Intact     AROM RLE (degrees)  RLE AROM: WFL  AROM LLE (degrees)  LLE AROM : WFL  AROM RUE (degrees)  RUE AROM : WFL  AROM LUE (degrees)  LUE AROM : WFL  Strength RLE  Strength RLE: WNL  Strength LLE  Strength LLE: WNL  Strength RUE  Strength RUE: WNL  Strength LUE  Strength LUE: WNL  Strength Other  Other: general weakness and decondtioning           Bed mobility  Bridging: Contact guard assistance  Rolling to Left: Stand by assistance  Rolling to Right: Stand by assistance  Supine to Sit: Stand by assistance  Sit to Supine: Stand by assistance  Scooting: Stand by assistance  Bed Mobility Comments: demonstrating safety and require extra time and effort  Transfers  Sit to Stand: Minimal Assistance  Stand to sit: Minimal Assistance  Bed to Chair: Minimal assistance  Comment: min Assit with walker  Ambulation  Device: Rolling Walker  Assistance: Minimal assistance  Quality of Gait: slow unsteady gait with complaint of dizziness, feeling funny limited tolerance  Distance: 12 ft RW  Comments: slow unsteady gait with complaint of dizziness, feeling funny limited tolerance  More Ambulation?: No     Balance  Posture: Good  Sitting - Static: Good  Sitting - Dynamic: Good  Standing - Static: Good  Standing - Dynamic: Fair;-  Functional Reach Test  Fall Risk (Score of <10 inches is fall risk): Yes  Exercise Treatment: Pt able to sit EOB x 10 minutes with dizziness subsiding, STS x 4 with AD CGA > SBA, instructed in bilateral LE AROM exercises to be completed on own to max functional strength .  Pt state familar and completes daily on own                                                        AM-PAC Score     AM-PAC Inpatient Mobility without Stair Climbing Raw Score : 16 (07/20/22 1602)  AM-PAC Inpatient without Stair Climbing T-Scale Score : 45.54 (07/20/22 1602)  Mobility Inpatient CMS 0-100% Score: 40.64 (07/20/22 1602)  Mobility Inpatient without Stair CMS G-Code Modifier : CK (07/20/22 1)              Goals  Short Term Goals  Time Frame for Short term goals: 10 visits  Short term goal 1: ambulate 100 ft with least AD mod I  Short term goal 2: ascend / descend 4 steps CGA  Short term goal 3: pt to tolerate 30 minutes exercise and activity to improve endurance and actiivty tolerance  Long Term Goals  Time Frame for Long term goals : 14 visits  Long term goal 1: pt to ambulate 150 ft std cane at baseline level of independence  Patient Goals   Patient goals : TO IMPROVE BALANCE       Education  Patient Education  Education Given To: Patient  Education Provided: Role of Therapy;Plan of Care;Home Exercise Program;Precautions; Equipment; Fall Prevention Strategies;Transfer Training  Education Provided Comments: safety with use of walker, written instruction in seated ROM exercises  Education Method: Demonstration;Printed Information/Hand-outs  Barriers to Learning: None  Education Outcome: Verbalized understanding;Demonstrated understanding;Continued education needed      Therapy Time   Individual Concurrent Group Co-treatment   Time In 0955         Time Out 1059         Minutes 64         Timed Code Treatment Minutes: [address] PT, DPT

## 2022-07-20 NOTE — CONSULTS
Neurology Consult Note        Reason for Consult: Generalized weakness  Requesting Physician: Dr. Marie Christiansen: Malaise, weakness    History Obtained From:  patient, electronic medical record       HISTORY OF PRESENT ILLNESS:              The patient is a 79 y.o. male with significant past medical history of head trauma in March 2011 after fall from ladder with skull fracture along with subdural hematoma that was treated conservatively, headaches, Parkinson's disease manifesting as gait apraxia along with bilateral hand resting tremor, single-vessel CAD status post cardiac cath on 6/2022 with LAD stent placement, CKD stage III, HTN, chronic pain/DJD/OA, and HLD who presents on 7/19/2002 with complaints of malaise and generalized weakness. Patient was seen earlier on same day at neurology clinic and recommendations for adjustments to timing of PD medication were made. Patient still complaining of fatigue at resolution outpatient visit wanting to go to ED so transportation was arranged. He complains of generalized weakness, fatigue, and increased difficulty with ADLs. Patient lives alone in his house with dogs and has no outside help other than visiting home therapist vs. nurse (patient was uncertain) who comes to house twice per week. In the ED, patient had unremarkable labs, negative tropes, normal electrolytes, unremarkable chest x-ray, negative COVID test, and UA negative for urinary infection. Neurologic exam was nonfocal but remarkable for significant bilateral hand tremors and imbalance with standing and ambulation. Patient was given a liter of fluids in the ED but continued to feel generally weak and felt unsafe to go home so was admitted to observation for PT/OT and social work consult for placement.       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 [address] 3/18/2011    d/t being hurt on the job    CKD (chronic kidney disease) stage 2, GFR 60-89 ml/min     CKD (chronic kidney disease) stage 3, GFR 30-59 ml/min (HCC)     Diverticulosis of colon     GERD (gastroesophageal reflux disease)     Impaired renal function     MRSA (methicillin resistant staph aureus) culture positive 9/23/2015    leg    Osteoarthritis of knee     Left    Parkinson disease ([facility] [address]     Proteinuria     Skull fracture ([facility] 3/18/2011    d/t 12-foot drop on the job    Temporary loss of eyesight     periodically, happened x7    Tubular adenoma of colon 2012, 2017    [facility]     Past Surgical History:        Procedure Laterality Date    COLONOSCOPY  11/02/2012    poor prep, tubular adenoma    COLONOSCOPY  09/19/2017    diverticulosis, multiple polyps as described, spot marking done, pathology-tubular adenoma x 4    OH COLSC FLX W/RMVL OF TUMOR POLYP LESION SNARE TQ N/A 9/19/2017    COLONOSCOPY POLYPECTOMY SNARE/COLD BIOPSY with tatooing and apc transverse colon performed by Ora Dakin, MD at [place] [address]. Right     rotator cuff     Current Medications:   Current Facility-Administered Medications: entacapone (COMTAN) tablet 200 mg, 200 mg, Oral, 4x Daily  carbidopa-levodopa (SINEMET)  MG per tablet 1 tablet, 1 tablet, Oral, 4x Daily  allopurinol (ZYLOPRIM) tablet 100 mg, 100 mg, Oral, Daily  amitriptyline (ELAVIL) tablet 10 mg, 10 mg, Oral, Nightly  atorvastatin (LIPITOR) tablet 40 mg, 40 mg, Oral, Daily  carbidopa-levodopa (SINEMET)  MG per tablet 1 tablet, 1 tablet, Oral, 4x Daily  clopidogrel (PLAVIX) tablet 75 mg, 75 mg, Oral, Daily  pantoprazole (PROTONIX) tablet 40 mg, 40 mg, Oral, QAM AC  gabapentin (NEURONTIN) capsule 100 mg, 100 mg, Oral, TID  isosorbide mononitrate (IMDUR) extended release tablet 30 mg, 30 mg, Oral, Daily  metoprolol tartrate (LOPRESSOR) tablet 12.5 mg, 12.5 mg, Oral, BID  rOPINIRole (REQUIP) tablet 0.25 mg, 0.25 mg, Oral, Nightly  sodium chloride flush 0.9 % injection 5-40 mL, 5-40 mL, IntraVENous, 2 times per day  sodium chloride flush 0.9 % injection 5-40 mL, 5-40 mL, IntraVENous, PRN  0.9 % sodium chloride infusion, , IntraVENous, PRN  acetaminophen (TYLENOL) tablet 650 mg, 650 mg, Oral, Q4H PRN  ondansetron (ZOFRAN-ODT) disintegrating tablet 4 mg, 4 mg, Oral, Q8H PRN **OR** ondansetron (ZOFRAN) injection 4 mg, 4 mg, IntraVENous, Q6H PRN  Allergies:  Dye [iodides] and Aspirin    Social History:  TOBACCO:   reports that he has quit smoking. He has never used smokeless tobacco.  ETOH:   reports no history of alcohol use. DRUGS:   reports no history of drug use. Patient currently lives alone    Family History:       Problem Relation Age of Onset    No Known Problems Mother     No Known Problems Father        REVIEW OF SYSTEMS:  Review of Systems   Constitutional:  Positive for activity change and fatigue. Respiratory:  Positive for shortness of breath. Cardiovascular:  Negative for chest pain. Neurological:  Positive for tremors, weakness and headaches. Negative for dizziness, syncope and numbness. Psychiatric/Behavioral:  Negative for behavioral problems and confusion. PHYSICAL EXAM:    Vitals:  BP (!) 156/87   Pulse (!) 117   Temp 98.1 °F (36.7 °C) (Oral)   Resp 18   Ht 5' 7.01\" (1.702 m)   Wt 178 lb 1.6 oz (80.8 kg)   SpO2 97%   BMI 27.89 kg/m²      General Appearance: Patient found sitting up on bed, in no acute distress    Mental status   Alert.   Oriented to person, place, and time  Speech is fluent without paraphasic errors  Can do 1 step, 2 step, and cross-body commands  Language appropriate  No hallucinations or delusions   Cranial nerves   II - VFF  III, IV, VI - extra-ocular muscles full: no pupillary defect; no MAIRA, no nystagmus, no ptosis       V - sensation symmetric         VII -  No facial droop or NLF  VIII - intact hearing to conversational tone          IX, X - symmetrical palate elevation   XI - 5/5 strength  XII - tongue midline   Motor function  5/5 in b/l upper and lower extremity  Normal muscle bulk. No increased tone. Resting tremor at bilateral hands (right worse than left)  Bilateral bradykinesia with finger tapping (right worse than left)   Sensory function Symmetric to touch   Cerebellar No dysmetria    Reflex function 2+ b/l symmetric in biceps, brachioradialis, patellar, calcaneal   Gait                  Gait apraxia, shuffling feet, imbalance with standing and walking, uses cane for ambulation         Additional Examination Elements and Findings:    Physical Exam  HENT:      Head:      Comments: hypersalivation noted     Nose: Nose normal.   Eyes:      Extraocular Movements: Extraocular movements intact. Conjunctiva/sclera: Conjunctivae normal.      Pupils: Pupils are equal, round, and reactive to light. Cardiovascular:      Rate and Rhythm: Normal rate and regular rhythm. Pulmonary:      Effort: No respiratory distress. Breath sounds: Rhonchi (rhonchi to auscultation on left lower lung lobe) present. No wheezing or rales. Skin:     Coloration: Skin is not jaundiced or pale. DATA  Lab Results:   CBC:   Recent Labs     07/19/22  1454   WBC 7.1   HGB 13.9        BMP:    Recent Labs     07/19/22  1454      K 4.9      CO2 26   BUN 19   CREATININE 1.45*   GLUCOSE 95         Lab Results   Component Value Date    CHOL 100 06/18/2022    LDLCHOLESTEROL 47 06/18/2022    HDL 34 (L) 06/18/2022    TRIG 97 06/18/2022    ALT 25 07/19/2022    AST 25 07/19/2022    TSH 3.23 09/01/2021    INR 1.0 06/07/2022    LABA1C 6.4 (H) 06/08/2022    LABMICR 25 08/20/2014    [id number] 889 08/20/2020       No results found for: PHENYTOIN, PHENYTOIN, VALPROATE, CBMZ    Imaging:  XR CHEST PORTABLE    Result Date: 7/19/2022  Clear lungs. Cardiomegaly. No acute cardiopulmonary abnormality.          IMPRESSION/RECOMMENDATIONS:     Parkinson's disease with gait disturbance  Pt has complaints of overall malaise, diffuse weakness, and increased difficulty with ADLs.   Physical exam is non-focal and without any loss of objective motor strength  Suspect symptoms are secondary to natural progression of Parkinson's disease, lower suspicion of any acute neurologic process  Sinemet 25/100 p.o. 4 times daily scheduled at 8 AM, 2 PM, 5 PM, and bedtime (timing of when to take medications adjusted from prior schedule per recommendations established in neurology clinic with Dr. Luis Marie and Dr. Donald Wood on 7/19)  Entacapone 200 mg p.o. 4 times daily; to be taken at same time as Sinemet  Patient likely to benefit from home health care  Follow-up in neurology clinic in 2 months      [address]  Neurology Resident PGY2  7/20/2022

## 2022-07-21 ENCOUNTER — CARE COORDINATION (OUTPATIENT)
Dept: CARE COORDINATION | Age: 82
End: 2022-07-21

## 2022-07-21 PROCEDURE — 6370000000 HC RX 637 (ALT 250 FOR IP): Performed by: STUDENT IN AN ORGANIZED HEALTH CARE EDUCATION/TRAINING PROGRAM

## 2022-07-21 PROCEDURE — 6370000000 HC RX 637 (ALT 250 FOR IP): Performed by: EMERGENCY MEDICINE

## 2022-07-21 PROCEDURE — 2580000003 HC RX 258: Performed by: EMERGENCY MEDICINE

## 2022-07-21 PROCEDURE — G0378 HOSPITAL OBSERVATION PER HR: HCPCS

## 2022-07-21 RX ADMIN — ACETAMINOPHEN 650 MG: 325 TABLET ORAL at 08:38

## 2022-07-21 RX ADMIN — ROPINIROLE HYDROCHLORIDE 0.25 MG: 0.25 TABLET, FILM COATED ORAL at 20:29

## 2022-07-21 RX ADMIN — CARBIDOPA AND LEVODOPA 1 TABLET: 25; 100 TABLET ORAL at 20:29

## 2022-07-21 RX ADMIN — PANTOPRAZOLE SODIUM 40 MG: 40 TABLET, DELAYED RELEASE ORAL at 08:38

## 2022-07-21 RX ADMIN — ENTACAPONE 200 MG: 200 TABLET, FILM COATED ORAL at 14:01

## 2022-07-21 RX ADMIN — GABAPENTIN 100 MG: 100 CAPSULE ORAL at 08:38

## 2022-07-21 RX ADMIN — DESMOPRESSIN ACETATE 40 MG: 0.2 TABLET ORAL at 08:38

## 2022-07-21 RX ADMIN — ENTACAPONE 200 MG: 200 TABLET, FILM COATED ORAL at 08:38

## 2022-07-21 RX ADMIN — SODIUM CHLORIDE, PRESERVATIVE FREE 10 ML: 5 INJECTION INTRAVENOUS at 08:41

## 2022-07-21 RX ADMIN — ALLOPURINOL 100 MG: 100 TABLET ORAL at 08:38

## 2022-07-21 RX ADMIN — GABAPENTIN 100 MG: 100 CAPSULE ORAL at 20:29

## 2022-07-21 RX ADMIN — CLOPIDOGREL 75 MG: 75 TABLET, FILM COATED ORAL at 08:38

## 2022-07-21 RX ADMIN — SODIUM CHLORIDE, PRESERVATIVE FREE 10 ML: 5 INJECTION INTRAVENOUS at 23:14

## 2022-07-21 RX ADMIN — AMITRIPTYLINE HYDROCHLORIDE 10 MG: 10 TABLET, FILM COATED ORAL at 20:29

## 2022-07-21 RX ADMIN — ENTACAPONE 200 MG: 200 TABLET, FILM COATED ORAL at 20:29

## 2022-07-21 RX ADMIN — CARBIDOPA AND LEVODOPA 1 TABLET: 25; 100 TABLET ORAL at 14:01

## 2022-07-21 RX ADMIN — CARBIDOPA AND LEVODOPA 1 TABLET: 25; 100 TABLET ORAL at 17:30

## 2022-07-21 RX ADMIN — ENTACAPONE 200 MG: 200 TABLET, FILM COATED ORAL at 17:30

## 2022-07-21 RX ADMIN — CARBIDOPA AND LEVODOPA 1 TABLET: 25; 100 TABLET ORAL at 08:38

## 2022-07-21 RX ADMIN — ISOSORBIDE MONONITRATE 30 MG: 30 TABLET ORAL at 08:38

## 2022-07-21 RX ADMIN — GABAPENTIN 100 MG: 100 CAPSULE ORAL at 14:01

## 2022-07-21 ASSESSMENT — PAIN SCALES - GENERAL
PAINLEVEL_OUTOF10: 3
PAINLEVEL_OUTOF10: 0
PAINLEVEL_OUTOF10: 0

## 2022-07-21 ASSESSMENT — PAIN DESCRIPTION - DESCRIPTORS: DESCRIPTORS: ACHING

## 2022-07-21 ASSESSMENT — PAIN DESCRIPTION - LOCATION: LOCATION: HEAD

## 2022-07-21 ASSESSMENT — PAIN DESCRIPTION - PAIN TYPE: TYPE: ACUTE PAIN

## 2022-07-21 NOTE — ACP (ADVANCE CARE PLANNING)
Advance Care Planning     Advance Care Planning Activator (Inpatient)  Conversation Note      Date of ACP Conversation: 7/21/2022     Conversation Conducted with: Patient with Decision Making Capacity    ACP Activator: Moreno Dodd RN        Health Care Decision Maker:     Current Designated Health Care Decision Maker:     Primary Decision Maker: Segundo Arias - [place] - [phone]    Secondary Decision Maker: Jacob Muro - [phone]  Click here to complete Healthcare Decision Makers including section of the Healthcare Decision Maker Relationship (ie \"Primary\")      Care Preferences    Ventilation: \"If you were in your present state of health and suddenly became very ill and were unable to breathe on your own, what would your preference be about the use of a ventilator (breathing machine) if it were available to you? \"      Would the patient desire the use of ventilator (breathing machine)?: yes    \"If your health worsens and it becomes clear that your chance of recovery is unlikely, what would your preference be about the use of a ventilator (breathing machine) if it were available to you? \"     Would the patient desire the use of ventilator (breathing machine)?: Yes      Resuscitation  \"CPR works best to restart the heart when there is a sudden event, like a heart attack, in someone who is otherwise healthy. Unfortunately, CPR does not typically restart the heart for people who have serious health conditions or who are very sick. \"    \"In the event your heart stopped as a result of an underlying serious health condition, would you want attempts to be made to restart your heart (answer \"yes\" for attempt to resuscitate) or would you prefer a natural death (answer \"no\" for do not attempt to resuscitate)? \" yes       [] Yes   [] No   Educated Patient / Moses Kohler regarding differences between Advance Directives and portable DNR orders.     Length of ACP Conversation in minutes:      Pete Wall Outcomes:  [] ACP discussion completed  [] Existing advance directive reviewed with patient; no changes to patient's previously recorded wishes  [] New Advance Directive completed  [] Portable Do Not Rescitate prepared for Provider review and signature  [] POLST/POST/MOLST/MOST prepared for Provider review and signature      Follow-up plan:    [] Schedule follow-up conversation to continue planning  [] Referred individual to Provider for additional questions/concerns   [] Advised patient/agent/surrogate to review completed ACP document and update if needed with changes in condition, patient preferences or care setting    [] This note routed to one or more involved healthcare providers

## 2022-07-21 NOTE — PROGRESS NOTES
OBS/CDU   RESIDENT NOTE      Patients PCP is:  Noe Mota, APRN - [place]        SUBJECTIVE      No acute events overnight. Awaiting placement,  He reports feeling improved this am, worked well with PT yesterday. PHYSICAL EXAM      General: NAD, AO X 3  Heent: EMOI, PERRL  Neck: SUPPLE, NO JVD  Cardiovascular: RRR, S1S2  Pulmonary: CTAB, NO SOB  Abdomen: SOFT, NTTP, ND, +BS  Extremities: +2/4 PULSES DISTAL, NO SWELLING  Neuro / Psych: NO NUMBNESS OR TINGLING, MENTATION AT BASELINE    PERTINENT TEST /EXAMS      I have reviewed all available laboratory results. MEDICATIONS CURRENT   entacapone (COMTAN) tablet 200 mg, 4x Daily  allopurinol (ZYLOPRIM) tablet 100 mg, Daily  amitriptyline (ELAVIL) tablet 10 mg, Nightly  atorvastatin (LIPITOR) tablet 40 mg, Daily  carbidopa-levodopa (SINEMET)  MG per tablet 1 tablet, 4x Daily  clopidogrel (PLAVIX) tablet 75 mg, Daily  pantoprazole (PROTONIX) tablet 40 mg, QAM AC  gabapentin (NEURONTIN) capsule 100 mg, TID  isosorbide mononitrate (IMDUR) extended release tablet 30 mg, Daily  rOPINIRole (REQUIP) tablet 0.25 mg, Nightly  sodium chloride flush 0.9 % injection 5-40 mL, 2 times per day  sodium chloride flush 0.9 % injection 5-40 mL, PRN  0.9 % sodium chloride infusion, PRN  acetaminophen (TYLENOL) tablet 650 mg, Q4H PRN  ondansetron (ZOFRAN-ODT) disintegrating tablet 4 mg, Q8H PRN   Or  ondansetron (ZOFRAN) injection 4 mg, Q6H PRN        All medication charted and reviewed. CONSULTS      IP CONSULT TO NEUROLOGY  IP CONSULT TO CASE MANAGEMENT  IP CONSULT TO HOME CARE NEEDS    ASSESSMENT/PLAN        Pop Zurita is a 79 y.o. male who presents with weakness and concern for falls from Neurology clinic. Placed in Obs for PT/PT and ECF placement. Has been accepted at [facility], awaiting precert.         --  Esme Ye MD  Emergency Medicine Resident

## 2022-07-21 NOTE — CARE COORDINATION
Writer spoke to Ola Scheuermann [address] to find that precert is still pending and Ola Scheuermann will notify CM when more information is available. 1 JOVANNA and PASRR are complete and put in discharge packet.

## 2022-07-21 NOTE — PROGRESS NOTES
[address]  CDU / OBSERVATION ENCOUNTER  ATTENDING NOTE       I performed a history and physical examination of the patient and discussed management with the resident or midlevel provider. I reviewed the resident or midlevel provider's note and agree with the documented findings and plan of care. Any areas of disagreement are noted on the chart. I was personally present for the key portions of any procedures. I have documented in the chart those procedures where I was not present during the key portions. I have reviewed the nurses notes. I agree with the chief complaint, past medical history, past surgical history, allergies, medications, social and family history as documented unless otherwise noted below. The Family history, social history, and ROS are effectively unchanged since admission unless noted elsewhere in the chart. No new medical complaint. Patient ready for care. Patient awaiting precertification.     Gonzales Mitchell MD  Attending Emergency  Physician

## 2022-07-21 NOTE — H&P
[address] [employer]  CDU / OBSERVATION ENCOUNTER  ATTENDING NOTE     Pt Name: Nain To  MRN: [id number]  [place] 1940  Date of evaluation: 7/21/22  Patient's PCP is :  JW Yeboah CNP    CHIEF COMPLAINT       Chief Complaint   Patient presents with    Fatigue     Was at neurology appt. [place] weak         HISTORY OF PRESENT ILLNESS    Nain To is a 79 y.o. male who presents complaints of generalized weakness. Patient had recent cardiac catheterization which showed patent stents. Patient was having fatigue and generalized discomfort for about a week. Patient states that he is having some dyspnea. Patient had work-up for PTE and work-up for further cardiac issues since stenting and work-up has been negative. Patient has a history of Parkinson's and is occulted caring for himself. Patient is falling frequently. He has not fallen in the past week however. Patient had been evaluated by his neurologist in the office. Patient was fatigued and having symptoms such that he felt he needed further evaluation. Patient's were made for him to come to the emergency department for more aggressive care. .  Patient had medications adjusted and arrangements were made for placement. Location/Symptom: Generalized fatigue  Timing/Onset: Acute exacerbation chronic condition  Provocation: Unclear. Quality: Generalized weakness and feeling unwell  Radiation: None  Severity: Moderate  Timing/Duration: Acute exacerbation chronic condition  Modifying Factors: Uncertain     REVIEW OF SYSTEMS        Constitutional:  Positive for fatigue. Negative for chills and fever. HENT:  Negative for congestion and trouble swallowing. Eyes:  Negative for visual disturbance. Respiratory:  Negative for cough, chest tightness and shortness of breath. Gastrointestinal:  Negative for abdominal pain, diarrhea, nausea and vomiting. Endocrine: Negative for polyuria.   Genitourinary:  Negative for dysuria, flank pain, hematuria and urgency. Musculoskeletal:  Negative for back pain and myalgias. Skin:  Negative for color change. Allergic/Immunologic: Negative for immunocompromised state. Neurological:  Positive for weakness. Negative for dizziness, syncope, light-headedness and headaches  (PQRS) Advance directives on face sheet per hospital policy. 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 ([facility] [address] CKD (chronic kidney disease) stage 2, GFR 60-89 ml/min, CKD (chronic kidney disease) stage 3, GFR 30-59 ml/min ([facility]), Diverticulosis of colon, GERD (gastroesophageal reflux disease), Impaired renal function, MRSA (methicillin resistant staph aureus) culture positive, Osteoarthritis of knee, Parkinson disease ([place] [address] Proteinuria, Skull fracture ([facility] [address] Temporary loss of eyesight, and Tubular adenoma of colon. I have reviewed the past medical history with the patient and it is  pertinent to this complaint. SURGICAL HISTORY      has a past surgical history that includes Colonoscopy (11/02/2012); shoulder surgery (Right); pr colsc flx w/rmvl of tumor polyp lesion snare tq (N/A, 9/19/2017); and Colonoscopy (09/19/2017). I have reviewed and agree with Surgical History entered and it is  pertinent to this complaint.      CURRENT MEDICATIONS     entacapone (COMTAN) tablet 200 mg, 4x Daily  allopurinol (ZYLOPRIM) tablet 100 mg, Daily  amitriptyline (ELAVIL) tablet 10 mg, Nightly  atorvastatin (LIPITOR) tablet 40 mg, Daily  carbidopa-levodopa (SINEMET)  MG per tablet 1 tablet, 4x Daily  clopidogrel (PLAVIX) tablet 75 mg, Daily  pantoprazole (PROTONIX) tablet 40 mg, QAM AC  gabapentin (NEURONTIN) capsule 100 mg, TID  isosorbide mononitrate (IMDUR) extended release tablet 30 mg, Daily  rOPINIRole (REQUIP) tablet 0.25 mg, Nightly  sodium chloride flush 0.9 % injection 5-40 mL, 2 times per day  sodium chloride flush 0.9 % injection 5-40 mL, PRN  0.9 % sodium chloride infusion, PRN  acetaminophen (TYLENOL) tablet 650 mg, Q4H PRN  ondansetron (ZOFRAN-ODT) disintegrating tablet 4 mg, Q8H PRN   Or  ondansetron (ZOFRAN) injection 4 mg, Q6H PRN        All medication charted and reviewed. ALLERGIES     is allergic to dye [iodides] and aspirin. FAMILY HISTORY     He indicated that the status of his mother is unknown. He indicated that the status of his father is unknown.     family history includes No Known Problems in his father and mother. The patient denies any pertinent family history. I have reviewed and agree with the family history entered. I have reviewed the Family History and it is not significant to the case    SOCIAL HISTORY      reports that he has quit smoking. He has never used smokeless tobacco. He reports that he does not drink alcohol and does not use drugs. I have reviewed and agree with all Social.  There are no  concerns for substance abuse/use. PHYSICAL EXAM     INITIAL VITALS:  height is 5' 7.01\" (1.702 m) and weight is 178 lb 1.6 oz (80.8 kg). His oral temperature is 97.5 °F (36.4 °C). His blood pressure is 103/58 (abnormal) and his pulse is 59. His respiration is 18 and oxygen saturation is 98%. CONSTITUTIONAL: AOx4, no apparent distress, appears stated age     HEAD: normocephalic, atraumatic   EYES: PERRLA, EOMI    ENT: moist mucous membranes, uvula midline   NECK: supple, symmetric   BACK: symmetric   LUNGS: clear to auscultation bilaterally   CARDIOVASCULAR: regular rate and rhythm, no murmurs, rubs or gallops   ABDOMEN: soft, non-tender, non-distended with normal active bowel sounds   NEUROLOGIC:  Patient demonstrates tremor. Masked facies, no focal neurologic deficit   MUSCULOSKELETAL: no clubbing, cyanosis or edema   SKIN: no rash or wounds       DIFFERENTIAL DIAGNOSIS/MDM:     Patient demonstrating signs of more advanced parkinsonism.   Patient with difficulty caring for self at home.  Appreciate neurology input. Will adjust medications. ECF for safety and physical therapy. Accepted at [address]     EKG: All EKG's are interpreted by the Observation Physician who either signs or Co-signs this chart in the absence of a cardiologist.    EKG Interpretation        RADIOLOGY:   I directly visualized the following  images and reviewed the radiologist interpretations:    XR CHEST PORTABLE    Result Date: 7/19/2022  EXAMINATION: ONE XRAY VIEW OF THE CHEST 7/19/2022 2:15 pm COMPARISON: June 26, 2022. HISTORY: ORDERING SYSTEM PROVIDED HISTORY: weakness TECHNOLOGIST PROVIDED HISTORY: weakness FINDINGS: A portable upright frontal view chest radiograph was obtained. The heart is enlarged. The mediastinal contour and pleural spaces are otherwise within normal limits. The lungs are grossly clear. There is no focal consolidation or pneumothorax. The pulmonary vascular pattern is within normal limits. No acute thoracic osseous abnormality. Clear lungs. Cardiomegaly. No acute cardiopulmonary abnormality. LABS:  I have reviewed and interpreted all available lab results. Labs Reviewed   CBC WITH AUTO DIFFERENTIAL - Abnormal; Notable for the following components:       Result Value    Hematocrit 40.3 (*)     Seg Neutrophils 68 (*)     Lymphocytes 21 (*)     All other components within normal limits   COMPREHENSIVE METABOLIC PANEL - Abnormal; Notable for the following components:    Creatinine 1.45 (*)     GFR Non- 47 (*)     GFR  56 (*)     All other components within normal limits   URINALYSIS WITH REFLEX TO CULTURE - Abnormal; Notable for the following components:    Specific Gravity, UA 1.004 (*)     All other components within normal limits   COVID-19, RAPID   TROPONIN   TROPONIN         CDU IMPRESSION / PLAN      Dario Salinas is a 79 y.o. male who presents with acute exacerbation chronic parkinsonism.     Generalized weakness, history of frequent falls  Neurology evaluation  adjust medications  Therapy evaluation  ECF placement. Case management notes  Continue home medications and pain control  Monitor vitals, labs, and imaging  DISPO: pending consults and clinical improvement    CONSULTS:    IP CONSULT TO NEUROLOGY  IP CONSULT TO CASE MANAGEMENT  IP CONSULT TO HOME CARE NEEDS    PROCEDURES:  Not indicated        PATIENT REFERRED TO:    Walt Cullen, APRN - CNP  [address] [facility] [address] [facility] ED  [address]          --  Kelley Gomez MD   Emergency Medicine Attending    This dictation was generated by voice recognition computer software. Although all attempts are made to edit the dictation for accuracy, there may be errors in the transcription that are not intended.

## 2022-07-22 VITALS
TEMPERATURE: 97.3 F | SYSTOLIC BLOOD PRESSURE: 148 MMHG | BODY MASS INDEX: 27.95 KG/M2 | DIASTOLIC BLOOD PRESSURE: 81 MMHG | WEIGHT: 178.1 LBS | HEIGHT: 67 IN | OXYGEN SATURATION: 99 % | RESPIRATION RATE: 18 BRPM | HEART RATE: 62 BPM

## 2022-07-22 PROCEDURE — G0378 HOSPITAL OBSERVATION PER HR: HCPCS

## 2022-07-22 PROCEDURE — 6370000000 HC RX 637 (ALT 250 FOR IP): Performed by: EMERGENCY MEDICINE

## 2022-07-22 PROCEDURE — 97535 SELF CARE MNGMENT TRAINING: CPT

## 2022-07-22 PROCEDURE — 6370000000 HC RX 637 (ALT 250 FOR IP): Performed by: STUDENT IN AN ORGANIZED HEALTH CARE EDUCATION/TRAINING PROGRAM

## 2022-07-22 PROCEDURE — 94761 N-INVAS EAR/PLS OXIMETRY MLT: CPT

## 2022-07-22 RX ADMIN — GABAPENTIN 100 MG: 100 CAPSULE ORAL at 10:00

## 2022-07-22 RX ADMIN — ENTACAPONE 200 MG: 200 TABLET, FILM COATED ORAL at 16:51

## 2022-07-22 RX ADMIN — CARBIDOPA AND LEVODOPA 1 TABLET: 25; 100 TABLET ORAL at 10:00

## 2022-07-22 RX ADMIN — CARBIDOPA AND LEVODOPA 1 TABLET: 25; 100 TABLET ORAL at 14:24

## 2022-07-22 RX ADMIN — CLOPIDOGREL 75 MG: 75 TABLET, FILM COATED ORAL at 10:00

## 2022-07-22 RX ADMIN — ALLOPURINOL 100 MG: 100 TABLET ORAL at 10:00

## 2022-07-22 RX ADMIN — PANTOPRAZOLE SODIUM 40 MG: 40 TABLET, DELAYED RELEASE ORAL at 10:00

## 2022-07-22 RX ADMIN — DESMOPRESSIN ACETATE 40 MG: 0.2 TABLET ORAL at 10:00

## 2022-07-22 RX ADMIN — ENTACAPONE 200 MG: 200 TABLET, FILM COATED ORAL at 14:24

## 2022-07-22 RX ADMIN — CARBIDOPA AND LEVODOPA 1 TABLET: 25; 100 TABLET ORAL at 16:51

## 2022-07-22 RX ADMIN — ENTACAPONE 200 MG: 200 TABLET, FILM COATED ORAL at 10:00

## 2022-07-22 RX ADMIN — GABAPENTIN 100 MG: 100 CAPSULE ORAL at 14:24

## 2022-07-22 RX ADMIN — ISOSORBIDE MONONITRATE 30 MG: 30 TABLET ORAL at 10:00

## 2022-07-22 NOTE — PROGRESS NOTES
Report called to [facility]. Discussed patient with nursing staff. All questions answered.

## 2022-07-22 NOTE — PROGRESS NOTES
OBS/CDU   RESIDENT NOTE      Patients PCP is:  JW Smith - FELIPE        SUBJECTIVE      No acute events overnight. Patient doing well. Complains of some left knee soreness after working with physical therapy but states that he is overall feeling well. PHYSICAL EXAM      General: NAD, AO X 3  Heent: EMOI, PERRL  Neck: SUPPLE, NO JVD  Cardiovascular: RRR, S1S2  Pulmonary: CTAB, NO SOB  Abdomen: SOFT, NTTP, ND, +BS  Extremities: +2/4 PULSES DISTAL, NO SWELLING  Neuro / Psych: NO NUMBNESS OR TINGLING, MENTATION AT BASELINE    PERTINENT TEST /EXAMS      I have reviewed all available laboratory results. MEDICATIONS CURRENT   entacapone (COMTAN) tablet 200 mg, 4x Daily  allopurinol (ZYLOPRIM) tablet 100 mg, Daily  amitriptyline (ELAVIL) tablet 10 mg, Nightly  atorvastatin (LIPITOR) tablet 40 mg, Daily  carbidopa-levodopa (SINEMET)  MG per tablet 1 tablet, 4x Daily  clopidogrel (PLAVIX) tablet 75 mg, Daily  pantoprazole (PROTONIX) tablet 40 mg, QAM AC  gabapentin (NEURONTIN) capsule 100 mg, TID  isosorbide mononitrate (IMDUR) extended release tablet 30 mg, Daily  rOPINIRole (REQUIP) tablet 0.25 mg, Nightly  sodium chloride flush 0.9 % injection 5-40 mL, 2 times per day  sodium chloride flush 0.9 % injection 5-40 mL, PRN  0.9 % sodium chloride infusion, PRN  acetaminophen (TYLENOL) tablet 650 mg, Q4H PRN  ondansetron (ZOFRAN-ODT) disintegrating tablet 4 mg, Q8H PRN   Or  ondansetron (ZOFRAN) injection 4 mg, Q6H PRN        All medication charted and reviewed. CONSULTS      IP CONSULT TO NEUROLOGY  IP CONSULT TO CASE MANAGEMENT  IP CONSULT TO HOME CARE NEEDS    ASSESSMENT/PLAN       Polo Herrera is a 79 y.o. male who presents with  weakness and concern for falls from Neurology clinic. Placed in Obs for PT/PT and ECF placement. [facility], awaiting precert.           --  Richard Tolbert DO  Emergency Medicine Resident Physician     This dictation was generated by voice recognition computer software. Although all attempts are made to edit the dictation for accuracy, there may be errors in the transcription that are not intended.

## 2022-07-22 NOTE — PROGRESS NOTES
[address]  CDU / OBSERVATION ENCOUNTER  ATTENDING NOTE       I performed a history and physical examination of the patient and discussed management with the resident or midlevel provider. I reviewed the resident or midlevel provider's note and agree with the documented findings and plan of care. Any areas of disagreement are noted on the chart. I was personally present for the key portions of any procedures. I have documented in the chart those procedures where I was not present during the key portions. I have reviewed the nurses notes. I agree with the chief complaint, past medical history, past surgical history, allergies, medications, social and family history as documented unless otherwise noted below. The Family history, social history, and ROS are effectively unchanged since admission unless noted elsewhere in the chart. Patient without medical complaint. Awaiting precertification. Patient has been accepted to [facility].   Anticipating discharge    Cyndee Parisi MD  Attending Emergency  Physician

## 2022-07-22 NOTE — PROGRESS NOTES
Occupational Therapy  Facility/Department: [address] MED SURG  Occupational Therapy Daily Treatment Note    Name: Farhana Merino  : 1940  MRN: [id number]  Date of Service: 2022    Discharge Recommendations:  Patient would benefit from continued therapy after discharge  OT Equipment Recommendations  ADL Assistive Devices: Sock-Aid Hard;Reacher;Long-handled Sponge;Grab Bars - shower;Grab Bars - toilet       Patient Diagnosis(es): The encounter diagnosis was General weakness. 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 Past Surgical History:  has a past surgical history that includes Colonoscopy (2012); shoulder surgery (Right); pr colsc flx w/rmvl of tumor polyp lesion snare tq (N/A, 2017); and Colonoscopy (2017). Treatment Diagnosis: Generalized weakness      Assessment   Performance deficits / Impairments: Decreased functional mobility ; Decreased ADL status; Decreased safe awareness;Decreased strength;Decreased cognition;Decreased endurance;Decreased balance;Decreased sensation;Decreased high-level IADLs;Decreased coordination;Decreased fine motor control  Treatment Diagnosis: Generalized weakness  Prognosis: Good  Activity Tolerance  Activity Tolerance: Patient limited by fatigue;Patient Tolerated treatment well        Plan   Plan  Times per Week: 3-4x/wk  Current Treatment Recommendations: Strengthening, Balance training, Functional mobility training, Endurance training, Pain management, Safety education & training, assistance;Setup;Verbal cueing; Increased time to complete (assist to doff/don footies and underwear)  Toileting: Contact guard assistance;Setup; Increased time to complete (stood for angelina care at chair side)    Pt in bed upon arrival. Transferred to EOB and then to recliner. Pt setup for self care (see above for LOF). Pt needed increased time and assist d/t difficulty reaching LB/Feet. Pt on RA, no SOB with increased activity. Pt remained up in chair, call light and phone in reach. Chair alarm activated.         Bed mobility  Supine to Sit: Contact guard assistance  Sit to Supine: Unable to assess  Scooting: Minimal assistance  Bed Mobility Comments: demonstrating safety and require extra time and effort  Transfers  Sit to stand: Minimal assistance  Stand to sit: Minimal assistance  Transfer Comments: utilizing RW; verbal cues for hand placement prior to transfers     Cognition  Overall Cognitive Status: [facility]  Orientation  Overall Orientation Status: Within Functional Limits     Education Given To: Patient  Education Provided: Role of Therapy;Plan of Care;Transfer Training  Education Method: Verbal;Demonstration  Barriers to Learning: None  Education Outcome: Continued education needed     AM-PAC Score  AM-[facility] Inpatient Daily Activity Raw Score: 17 (07/22/22 1541)  AM-PAC Inpatient ADL T-Scale Score : 37.26 (07/22/22 1541)  ADL Inpatient CMS 0-100% Score: 50.11 (07/22/22 1541)  ADL Inpatient CMS G-Code Modifier : CK (07/22/22 1541)    Goals  Short Term Goals  Time Frame for Short term goals: By discharge,  Short Term Goal 1: Patient will demonstrate functional transfers/mobility during self care with SBA and least restrictive device  Short Term Goal 2: Patient will demonstrate lower body bathing/dressing/toileting with Min A and AE PRN  Short Term Goal 3: Patient will demonstrate dynamic standing for 6+ minutes during functional activity with SBA  Short Term Goal 4: Patient will participate in BUE FMC/strengthening tasks to improve ability to open small containers during self-care  Short Term Goal 5: Patient will demonstrate Good understanding of EC/WS techs with Min VCs to increase activity tolerance for participation in ADLs  Short Term Goal 6: Patient will actively participate in 15+ minutes of therapeutic exercise/functional activity to increase endurance and strength for participation in ADLs  Short Term Goal 7: Patient will demonstrate Good safety during all self care tasks       Therapy Time   Individual Concurrent Group Co-treatment   Time In 1005         Time Out 1107         Minutes 62         Timed Code Treatment Minutes: [address], [place]/L

## 2022-07-22 NOTE — CARE COORDINATION
Transitional planning. Radha Saxena [address] called and confirmed a 7-7:30pm  transport time to [address] [place] [address] with [facility] of transport time. Report: [phone] ask for skilled unit  FAX: [phone]  No need to fax - Steov Osborne will print out AVS/JOVANNA    1500 informed pt's RN Scott Marsh of transport time and provided report number, he will inform pt and pt's family if needed.

## 2022-07-25 ENCOUNTER — CARE COORDINATION (OUTPATIENT)
Dept: CARE COORDINATION | Age: 82
End: 2022-07-25

## 2022-07-25 ENCOUNTER — HOSPITAL ENCOUNTER (OUTPATIENT)
Dept: PAIN MANAGEMENT | Age: 82
Discharge: HOME | End: 2022-07-25

## 2022-07-25 NOTE — DISCHARGE SUMMARY
CDU Discharge Summary        Patient:  Mila Youngblood  YOB: 1940    MRN: [id number]   Acct: [de-identified]    Primary Care Physician: JW Thompson CNP    Admit date:  7/19/2022  1:49 PM  Discharge date: 7/22/2022  7:59 PM     Discharge Diagnoses:     Acute falls due to Parkinson's  Improved with PT/OT    Follow-up:  Call today/tomorrow for a follow up appointment with WJ Thompson CNP , or return to the Emergency Room with worsening symptoms    Stressed to patient the importance of following up with primary care doctor for further workup/management of symptoms. Pt verbalizes understanding and agrees with plan. Discharge Medications:  Changes to medications            Medication List        CONTINUE taking these medications      acetaminophen 650 MG extended release tablet  Commonly known as: Tylenol 8 Hour Arthritis Pain  Take 1 tablet by mouth every 8 hours as needed for Pain     allopurinol 100 MG tablet  Commonly known as: ZYLOPRIM  TAKE 1 TABLET BY MOUTH ONCE DAILY     amitriptyline 10 MG tablet  Commonly known as: ELAVIL  Take 1 tablet by mouth nightly     atorvastatin 40 MG tablet  Commonly known as: LIPITOR  take 1 tablet by mouth once daily     butalbital-acetaminophen-caffeine -40 MG per tablet  Commonly known as: FIORICET, ESGIC  Take 1 tab prn only for severe headache. Can repeat after 4 hrs if needed. Max 2 tabs/24 hrs.  Max 4 tabs/week     carbidopa-levodopa  MG per tablet  Commonly known as: SINEMET  TAKE 1 TABLET BY MOUTH 4 TIMES DAILY     clopidogrel 75 MG tablet  Commonly known as: PLAVIX  TAKE 1 TABLET BY MOUTH ONCE DAILY     diclofenac sodium 1 % Gel  Commonly known as: VOLTAREN  Apply 2 g topically 2 times daily as needed for Pain (joint pain)     entacapone 200 MG tablet  Commonly known as: COMTAN  TAKE ONE (1) TABLET BY MOUTH FOUR (4) TIMES DAILY WITH SINEMET     esomeprazole 20 MG delayed release capsule  Commonly known as: NEXIUM  TAKE 1 CAPSULE BY MOUTH ONCE DAILY IN THE MORNING BEFORE BREAKFAST     gabapentin 100 MG capsule  Commonly known as: NEURONTIN  Take 1 capsule by mouth 3 times daily for 30 days. isosorbide mononitrate 30 MG extended release tablet  Commonly known as: IMDUR  Take 1 tablet by mouth daily     magnesium oxide 400 (240 Mg) MG tablet  Commonly known as: MAG-OX  TAKE 1 TABLET BY MOUTH ONCE DAILY     metoprolol tartrate 25 MG tablet  Commonly known as: LOPRESSOR  Take 0.5 tablets by mouth 2 times daily     nitroGLYCERIN 0.4 MG SL tablet  Commonly known as: NITROSTAT  up to max of 3 total doses. If no relief after 1 dose, call 911. rOPINIRole 0.25 MG tablet  Commonly known as: REQUIP  TAKE ONE (1) TABLET BY MOUTH THREE (3) TIMES DAILY              Diet:  No diet orders on file , Advance as tolerated     Activity:  As tolerated    Consultants: IP CONSULT TO NEUROLOGY  IP CONSULT TO CASE MANAGEMENT  IP CONSULT TO HOME CARE NEEDS    Procedures:  Not indicated     Diagnostic Test:   Results for orders placed or performed during the hospital encounter of 07/19/22   COVID-19, Rapid    Specimen: Nasopharyngeal Swab   Result Value Ref Range    Specimen Description . NASOPHARYNGEAL SWAB     SARS-CoV-2, Rapid Not Detected Not Detected   CBC with Auto Differential   Result Value Ref Range    WBC 7.1 3.5 - 11.3 k/uL    RBC 4.30 4.21 - 5.77 m/uL    Hemoglobin 13.9 13.0 - 17.0 g/dL    Hematocrit 40.3 (L) 40.7 - 50.3 %    MCV 93.7 82.6 - 102.9 fL    MCH 32.3 25.2 - 33.5 pg    MCHC 34.5 28.4 - 34.8 g/dL    RDW 14.3 11.8 - 14.4 %    Platelets 555 937 - 370 k/uL    MPV 8.9 8.1 - 13.5 fL    NRBC Automated 0.0 0.0 per 100 WBC    Seg Neutrophils 68 (H) 36 - 65 %    Lymphocytes 21 (L) 24 - 43 %    Monocytes 10 3 - 12 %    Eosinophils % 1 1 - 4 %    Basophils 0 0 - 2 %    Immature Granulocytes 0 0 %    Segs Absolute 4.84 1.50 - 8.10 k/uL    Absolute Lymph # 1.46 1.10 - 3.70 k/uL    Absolute Mono # 0.69 0.10 - 1.20 k/uL    Absolute Eos # 0.07 0.00 - 0.44 k/uL    Basophils Absolute 0.03 0.00 - 0.20 k/uL    Absolute Immature Granulocyte <0.03 0.00 - 0.30 k/uL   Comprehensive Metabolic Panel   Result Value Ref Range    Glucose 95 70 - 99 mg/dL    BUN 19 8 - 23 mg/dL    Creatinine 1.45 (H) 0.70 - 1.20 mg/dL    Calcium 9.8 8.6 - 10.4 mg/dL    Sodium 140 135 - 144 mmol/L    Potassium 4.9 3.7 - 5.3 mmol/L    Chloride 102 98 - 107 mmol/L    CO2 26 20 - 31 mmol/L    Anion Gap 12 9 - 17 mmol/L    Alkaline Phosphatase 110 40 - 129 U/L    ALT 25 5 - 41 U/L    AST 25 <40 U/L    Total Bilirubin 0.50 0.3 - 1.2 mg/dL    Total Protein 7.3 6.4 - 8.3 g/dL    Albumin 4.4 3.5 - 5.2 g/dL    Albumin/Globulin Ratio 1.5 1.0 - 2.5    GFR Non- 47 (L) >60 mL/min    GFR  56 (L) >60 mL/min    GFR Comment         Troponin   Result Value Ref Range    Troponin, High Sensitivity 22 0 - 22 ng/L   Troponin   Result Value Ref Range    Troponin, High Sensitivity 18 0 - 22 ng/L   Urinalysis with Reflex to Culture    Specimen: Urine   Result Value Ref Range    Color, UA Yellow Yellow    Turbidity UA Clear Clear    Glucose, Ur NEGATIVE NEGATIVE    Bilirubin Urine NEGATIVE NEGATIVE    Ketones, Urine NEGATIVE NEGATIVE    Specific Gravity, UA 1.004 (L) 1.005 - 1.030    Urine Hgb NEGATIVE NEGATIVE    pH, UA 6.5 5.0 - 8.0    Protein, UA NEGATIVE NEGATIVE    Urobilinogen, Urine Normal Normal    Nitrite, Urine NEGATIVE NEGATIVE    Leukocyte Esterase, Urine NEGATIVE NEGATIVE    Urinalysis Comments       Microscopic exam not performed based on chemical results unless requested in original order. EKG 12 Lead   Result Value Ref Range    Ventricular Rate 61 BPM    Atrial Rate 61 BPM    P-R Interval 186 ms    QRS Duration 104 ms    Q-T Interval 430 ms    QTc Calculation (Bazett) 432 ms    P Axis 51 degrees    R Axis 40 degrees    T Axis 12 degrees     XR CHEST PORTABLE    Result Date: 7/19/2022  EXAMINATION: ONE XRAY VIEW OF THE CHEST 7/19/2022 2:15 pm COMPARISON: June 26, 2022. HISTORY: ORDERING SYSTEM PROVIDED HISTORY: weakness TECHNOLOGIST PROVIDED HISTORY: weakness FINDINGS: A portable upright frontal view chest radiograph was obtained. The heart is enlarged. The mediastinal contour and pleural spaces are otherwise within normal limits. The lungs are grossly clear. There is no focal consolidation or pneumothorax. The pulmonary vascular pattern is within normal limits. No acute thoracic osseous abnormality. Clear lungs. Cardiomegaly. No acute cardiopulmonary abnormality. XR CHEST PORTABLE    Result Date: 6/26/2022  EXAMINATION: ONE XRAY VIEW OF THE CHEST 6/26/2022 11:25 pm COMPARISON: 09/01/2021 HISTORY: ORDERING SYSTEM PROVIDED HISTORY: CP TECHNOLOGIST PROVIDED HISTORY: CP Reason for Exam: Chest pains Additional signs and symptoms: Chest pains Relevant Medical/Surgical History: Chest pains FINDINGS: No focal consolidation, pleural effusion or pneumothorax. The cardiomediastinal silhouette is stable. No overt pulmonary edema. The osseous structures are stable. No acute cardiopulmonary findings. CT CHEST PULMONARY EMBOLISM W CONTRAST    Result Date: 6/27/2022  EXAMINATION: CTA OF THE CHEST 6/27/2022 2:58 am TECHNIQUE: CTA of the chest was performed after the administration of intravenous contrast.  Multiplanar reformatted images are provided for review. MIP images are provided for review. Automated exposure control, iterative reconstruction, and/or weight based adjustment of the mA/kV was utilized to reduce the radiation dose to as low as reasonably achievable. COMPARISON: None.  HISTORY: ORDERING SYSTEM PROVIDED HISTORY: chest pain, positive d-dimer TECHNOLOGIST PROVIDED HISTORY: chest pain, positive d-dimer Decision Support Exception - unselect if not a suspected or confirmed emergency medical condition->Emergency Medical Condition (MA) Reason for Exam: chest pain, positive d-dimer FINDINGS: Pulmonary Arteries: Pulmonary arteries are adequately opacified for evaluation. No evidence of intraluminal filling defect to suggest pulmonary embolism. Main pulmonary artery is normal in caliber. Mediastinum: Cardiac chambers are not enlarged. There is no pericardial effusion or mediastinal hematoma. The thoracic aorta is not aneurysmal. Small noncalcified and partially calcified lymph nodes are present at the mediastinum and norman. No bulky lymphadenopathy is identified. Lungs/pleura: Some dependent and linear atelectasis in the posterior lungs. Few small calcified granulomas are also present. There is no consolidation, pleural effusion, or pneumothorax. Some mild COPD changes in the upper lobes but without bulla. Upper Abdomen: Large calcified gallstone is present in the gallbladder but without dilatation of the gallbladder. The adrenal glands are not enlarged. No free air free fluid at the upper abdomen. Perinephric stranding appears symmetric suggesting chronic renal disease. Soft Tissues/Bones: Degenerative changes at the included shoulders. No acute displaced rib fractures. There is no soft tissue emphysema. No evidence of pulmonary embolism or acute pulmonary abnormality. Some dependent and linear atelectasis in the posterior lungs and mild COPD changes. Cholelithiasis is also noted. Physical Exam:    General appearance - NAD, AOx 3   Lungs -CTAB, no R/R/R  Heart - RRR, no M/R/G  Abdomen - Soft, NT/ND  Neurological:  MAEx4, No focal motor deficit, sensory loss  Extremities - Cap refil <2 sec in all ext., no edema  Skin -warm, dry      Hospital Course:  Clinical course has improved, labs and imaging reviewed. Rupa Bridges originally presented to the hospital on 7/19/2022  1:49 PM. with worsening falls, unsteady gait secondary to Parkinson's. At that time it was determined that He required further observation and placement. He was admitted and labs and imaging were followed daily. Imaging results as above.   He is medically stable to be discharged. Disposition: Home    Patient stated that they will not drive themselves home from the hospital if they have gotten pain killers/ narcotics earlier that day and that they will arrange for transportation on their own or work with the  for a ride. Patient counseled NOT to drive while under the influence of narcotics/ pain killers. Condition: Good    Patient stable and ready for discharge SNF. I have discussed plan of care with patient and they are in understanding. They were instructed to read discharge paperwork. All of their questions and concerns were addressed. Time Spent: 0 day      --  Sabas Guevara DO  Emergency Medicine Resident Physician    This dictation was generated by voice recognition computer software. Although all attempts are made to edit the dictation for accuracy, there may be errors in the transcription that are not intended.

## 2022-07-25 NOTE — CARE COORDINATION
AMBER noted patient is at [facility]. AMBER called there and attempted to arrange a ride to his pain management appt today. Glen Jacinto stated that she will try to fit him in and if not she will call PM and explain that patient was admitted over the weekend and they are unable to provide transportation today.

## 2022-07-25 NOTE — DISCHARGE INSTRUCTIONS
Discharge Instructions following Sedation or Anesthesia:  You have  received  a sedative/anesthetic therefore, you should not consume any alcoholic beverages for minimum of 12 hours. Do not drive or operate machinery for 24 hours. Do not sign legal documents for 24 hours. Dizziness, drowsiness, and unsteadiness may occur. Rest when need to. Increase diet as tolerated. Keep up on fluids if diet allows. General Instructions:  Do not take a tub bath for 72 hours after procedure (this includes hot tubs and swimming pools). You may shower, but avoid hot water to injection site. Avoid strenuous activity TODAY especially if you experience dizziness. Remove band-aid the next day. Wash off any residual iodine   Do not use heat, heating pad, or any other heating device over the injection site for 3 days after the procedure. If you experience pain after your procedure, you may continue with your current pain medication as prescribed. (DO NOT INCREASE YOUR PAIN MEDICATION WITHOUT TALKING TO DOCTOR)  Soreness and pain at injection site is common, may use ice to reduce soreness. Please complete pain diary as instructed.      Call Fawad Beth at [phone] if you experience:   Fever, chills or temperature over 100    Vomiting, Headache, persistent stiff neck, nausea, blurred vision   Difficulty in urinating or unable to urinate with 8 hours   Increase in weakness, numbness or loss of function   Increased redness, swelling or drainage at the injection site

## 2022-07-28 ENCOUNTER — CARE COORDINATION (OUTPATIENT)
Dept: CARE COORDINATION | Age: 82
End: 2022-07-28

## 2022-07-28 NOTE — CARE COORDINATION
Patient called ACM to provide an update that he went to his pain management procedure and was sent back to the facility due to not having someone to stay with him. ACM explained that it can be rescheduled when he goes home. Patient is regaining some strength, he reports participating in PT at the SNF. Patient will keep ACM updated on DC plans.

## 2022-07-29 ENCOUNTER — HOSPITAL ENCOUNTER (OUTPATIENT)
Age: 82
Setting detail: SPECIMEN
Discharge: HOME | End: 2022-07-29

## 2022-07-29 LAB
ANION GAP SERPL CALCULATED.3IONS-SCNC: 14 MMOL/L (ref 9–17)
BUN BLDV-MCNC: 18 MG/DL (ref 8–23)
CALCIUM SERPL-MCNC: 8.9 MG/DL (ref 8.6–10.4)
CHLORIDE BLD-SCNC: 102 MMOL/L (ref 98–107)
CO2: 23 MMOL/L (ref 20–31)
CREAT SERPL-MCNC: 1.4 MG/DL (ref 0.7–1.2)
GFR AFRICAN AMERICAN: 59 ML/MIN
GFR NON-AFRICAN AMERICAN: 49 ML/MIN
GFR SERPL CREATININE-BSD FRML MDRD: ABNORMAL ML/MIN/{1.73_M2}
GLUCOSE BLD-MCNC: 224 MG/DL (ref 70–99)
HCT VFR BLD CALC: 42.8 % (ref 40.7–50.3)
HEMOGLOBIN: 15 G/DL (ref 13–17)
MCH RBC QN AUTO: 33.9 PG (ref 25.2–33.5)
MCHC RBC AUTO-ENTMCNC: 35 G/DL (ref 28.4–34.8)
MCV RBC AUTO: 96.8 FL (ref 82.6–102.9)
NRBC AUTOMATED: 0 PER 100 WBC
PDW BLD-RTO: 14.1 % (ref 11.8–14.4)
PLATELET # BLD: 251 K/UL (ref 138–453)
PMV BLD AUTO: 9.7 FL (ref 8.1–13.5)
POTASSIUM SERPL-SCNC: 4.3 MMOL/L (ref 3.7–5.3)
RBC # BLD: 4.42 M/UL (ref 4.21–5.77)
SODIUM BLD-SCNC: 139 MMOL/L (ref 135–144)
WBC # BLD: 5 K/UL (ref 3.5–11.3)

## 2022-07-29 PROCEDURE — 85027 COMPLETE CBC AUTOMATED: CPT

## 2022-07-29 PROCEDURE — P9603 ONE-WAY ALLOW PRORATED MILES: HCPCS

## 2022-07-29 PROCEDURE — 80048 BASIC METABOLIC PNL TOTAL CA: CPT

## 2022-07-29 PROCEDURE — 36415 COLL VENOUS BLD VENIPUNCTURE: CPT

## 2022-08-08 ENCOUNTER — CARE COORDINATION (OUTPATIENT)
Dept: CARE COORDINATION | Age: 82
End: 2022-08-08

## 2022-08-08 NOTE — CARE COORDINATION
ACM called [facility] Qui[facility]one[facility] and confirmed that the patient is still at their skilled facility. ACM will follow up in 2 weeks for a possible DC plan.

## 2022-08-09 ENCOUNTER — CARE COORDINATION (OUTPATIENT)
Dept: CARE COORDINATION | Age: 82
End: 2022-08-09

## 2022-08-10 ENCOUNTER — CARE COORDINATION (OUTPATIENT)
Dept: CARE COORDINATION | Age: 82
End: 2022-08-10

## 2022-08-15 ENCOUNTER — CARE COORDINATION (OUTPATIENT)
Dept: CARE COORDINATION | Age: 82
End: 2022-08-15

## 2022-08-15 NOTE — CARE COORDINATION
Attempting to reach patient for a follow up care coordination call regarding any needs, questions or concerns. Bharti Lundberg [facility] [phone]  No answer, No VM available.

## 2022-08-16 ENCOUNTER — CARE COORDINATION (OUTPATIENT)
Dept: CARE COORDINATION | Age: 82
End: 2022-08-16

## 2022-08-16 ENCOUNTER — TELEPHONE (OUTPATIENT)
Dept: PAIN MANAGEMENT | Age: 82
End: 2022-08-16

## 2022-08-16 NOTE — CARE COORDINATION
Ambulatory Care Coordination Note  8/16/2022    ACC: Benjamin Jimenez, RN    Summary  Date Care Coordination Episode Started: 6.30.22    Reason patient is in Care Coordination:     PCP referral      Topics Discussed Today:     Rescheduling procedure with pain management, patient has a difficult time understanding that he will need someone to stay with him at this appointment. He eventually stated that his neighbor could do that if ACM could get it rescheduled. ACM left  for PM to return call to schedule. Pain, unchanged and generalized, worse in the morning. No other concerns today. Assessments completed today:     Fall risk  Initial assessment  Medication reconciliation  [facility]      Care Coordinator plan of care: Will follow up with patient regarding PM appointment. Goals Addressed                   This Visit's Progress     Conditions and Symptoms   No change     I will schedule office visits, as directed by my provider. I will keep my appointment or reschedule if I have to cancel. I will notify my provider of any barriers to my plan of care. I will follow my Zone Management tool to seek urgent or emergent care. I will notify my provider of any symptoms that indicate a worsening of my condition. Barriers: overwhelmed by complexity of regimen  Plan for overcoming my barriers: WORK WITH [facility]  Confidence: 7/10  Anticipated Goal Completion Date: 7.5.21           Medication Management   Improving     I will take my medication as directed. I will notify my provider of any problems with medications, like adverse effects or side effects. I will notify my provider/Care Coordinator if I am unable to afford my medications. I will notify my provider for advice before I stop taking any of my medication.     Barriers: overwhelmed by complexity of regimen  Plan for overcoming my barriers: work with [facility]   Confidence: 9/10  Anticipated Goal Completion Date: 9.30.22              Prior to Admission medications    Medication Sig Start Date End Date Taking? Authorizing Provider   carbidopa-levodopa (SINEMET)  MG per tablet TAKE ONE (1) TABLET BY MOUTH FOUR (4) TIMES DAILY 8/9/22   JW Walker CNP   esomeprazole (NEXIUM) 20 MG delayed release capsule TAKE ONE (1) CAPSULE BY MOUTH ONCE DAILY IN THE MORNING BEFORE BREAKFAST 8/8/22   JW Walker CNP   rOPINIRole (REQUIP) 0.25 MG tablet TAKE ONE (1) TABLET BY MOUTH THREE (3) TIMES DAILY 8/8/22   JW Walker CNP   atorvastatin (LIPITOR) 40 MG tablet take 1 tablet by mouth once daily 8/4/22   JW Walker CNP   entacapone (COMTAN) 200 MG tablet TAKE ONE (1) TABLET BY MOUTH FOUR (4) TIMES DAILY WITH SINEMET 6/21/22   Moncho Roy MD   isosorbide mononitrate (IMDUR) 30 MG extended release tablet Take 1 tablet by mouth daily 6/21/22   Frederic Last, DO   metoprolol tartrate (LOPRESSOR) 25 MG tablet Take 0.5 tablets by mouth 2 times daily 6/20/22   Frederic Last, DO   gabapentin (NEURONTIN) 100 MG capsule Take 1 capsule by mouth 3 times daily for 30 days. 6/15/22 7/15/22  Michelle Calixto MD   nitroGLYCERIN (NITROSTAT) 0.4 MG SL tablet up to max of 3 total doses.  If no relief after 1 dose, call 911. 6/9/22   JW Toledo NP   magnesium oxide (MAG-OX) 400 (240 Mg) MG tablet TAKE 1 TABLET BY MOUTH ONCE DAILY 5/23/22   JW Walker CNP   diclofenac sodium (VOLTAREN) 1 % GEL Apply 2 g topically 2 times daily as needed for Pain (joint pain) 5/9/22   JW Neumann NP   amitriptyline (ELAVIL) 10 MG tablet Take 1 tablet by mouth nightly 4/5/22   Praful Shane MD   clopidogrel (PLAVIX) 75 MG tablet TAKE 1 TABLET BY MOUTH ONCE DAILY  11/5/21   JW Allen CNP   allopurinol (ZYLOPRIM) 100 MG tablet TAKE 1 TABLET BY MOUTH ONCE DAILY  11/5/21   JW Allen CNP   acetaminophen (TYLENOL 8 HOUR ARTHRITIS PAIN) 650 MG extended release tablet Take 1 tablet by mouth every 8 hours as needed for Pain 6/14/21   JW Ortez CNP   butalbital-acetaminophen-caffeine (FIORICET, ESGIC) -01 MG per tablet Take 1 tab prn only for severe headache. Can repeat after 4 hrs if needed. Max 2 tabs/24 hrs.  Max 4 tabs/week 6/14/21   JW Ortez CNP       Future Appointments   Date Time Provider \[facility] [place] [facility]\""   10/17/2022 10:45 AM JW Ortez CNP  [facility]   10/25/2022  1:00 PM Mireya Serrato MD Neuro [facility]    and   General Assessment    Do you have any symptoms that are causing concern?: Yes  Progression since Onset: Unchanged  Reported Symptoms: Pain (Comment: generalized pain)

## 2022-08-19 ENCOUNTER — CARE COORDINATION (OUTPATIENT)
Dept: CARE COORDINATION | Age: 82
End: 2022-08-19

## 2022-08-24 ENCOUNTER — CARE COORDINATION (OUTPATIENT)
Dept: CARE COORDINATION | Age: 82
End: 2022-08-24

## 2022-08-24 NOTE — CARE COORDINATION
Ambulatory Care Coordination Note  8/24/2022    ACC: Cesar Abraham, RN    Summary  Date Care Coordination Episode Started:  6.30.22    Reason patient is in Care Coordination:       Topics Discussed Today:     Assisted living, per patient he is supposed to have a meeting with someone today to talk about his options. He is unsure of who it is. ACM asked patient to have this person call ACM to discuss what the plan is. Pain, generalized and worsening per patient. Asked about the walk in clinic, ACM stated that he is able to go there if he wants to or ACM can try to get him an appt with PCP. Patient stated \"I will let you know\". He is scheduled for a block next month with PM.   Falls, no falls reported      Assessments completed today:     Fall risk 0 falls reported   Initial assessment  Medication reconciliation  SDOH  pain (Health assessments)      Care Coordinator plan of care: Follow up call in 5-7 days, continue education and support any needs. Goals Addressed    None         Prior to Admission medications    Medication Sig Start Date End Date Taking?  Authorizing Provider   pantoprazole (PROTONIX) 40 MG tablet TAKE 1 TABLET BY MOUTH ONCE DAILY FOR GERD 8/23/22   JW Perrin CNP   isosorbide mononitrate (IMDUR) 30 MG extended release tablet TAKE 1 TABLET BY MOUTH ONCE DAILY FOR HYPERTENSION 8/23/22   JW Perrin CNP   gabapentin (NEURONTIN) 100 MG capsule TAKE 1 CAPSULE BY MOUTH 3 TIMES DAILY FOR NERVES 8/23/22 9/22/22  JW Perrin CNP   metoprolol tartrate (LOPRESSOR) 25 MG tablet TAKE ONE HALF (1/2) ATBLETS = 12.5MG BY MOUTH TWICE A DAY FOR HYPERTENSION 8/23/22   JW Perrin CNP   carbidopa-levodopa (SINEMET)  MG per tablet TAKE ONE (1) TABLET BY MOUTH FOUR (4) TIMES DAILY 8/9/22   JW Perrin CNP   esomeprazole ([employer]) 20 MG delayed release capsule TAKE ONE (1) CAPSULE BY MOUTH ONCE DAILY IN THE MORNING BEFORE BREAKFAST 8/8/22   JW Dhillon CNP   rOPINIRole (REQUIP) 0.25 MG tablet TAKE ONE (1) TABLET BY MOUTH THREE (3) TIMES DAILY 8/8/22   JW Dhillon CNP   atorvastatin (LIPITOR) 40 MG tablet take 1 tablet by mouth once daily 8/4/22   JW Dhillon CNP   entacapone (COMTAN) 200 MG tablet TAKE ONE (1) TABLET BY MOUTH FOUR (4) TIMES DAILY WITH SINEMET 6/21/22   Gen Azar MD   nitroGLYCERIN (NITROSTAT) 0.4 MG SL tablet up to max of 3 total doses. If no relief after 1 dose, call 911. 6/9/22   JW Santos NP   magnesium oxide (MAG-OX) 400 (240 Mg) MG tablet TAKE 1 TABLET BY MOUTH ONCE DAILY 5/23/22   JW Dhillon CNP   diclofenac sodium (VOLTAREN) 1 % GEL Apply 2 g topically 2 times daily as needed for Pain (joint pain) 5/9/22   JW Allen NP   amitriptyline (ELAVIL) 10 MG tablet Take 1 tablet by mouth nightly 4/5/22   Sabino Jeffries MD   clopidogrel (PLAVIX) 75 MG tablet TAKE 1 TABLET BY MOUTH ONCE DAILY  11/5/21   JW Hillman CNP   allopurinol (ZYLOPRIM) 100 MG tablet TAKE 1 TABLET BY MOUTH ONCE DAILY  11/5/21   JW Hillman CNP   acetaminophen (TYLENOL 8 HOUR ARTHRITIS PAIN) 650 MG extended release tablet Take 1 tablet by mouth every 8 hours as needed for Pain 6/14/21   JW Dhillon CNP   butalbital-acetaminophen-caffeine (FIORICET, ESGIC) -40 MG per tablet Take 1 tab prn only for severe headache. Can repeat after 4 hrs if needed. Max 2 tabs/24 hrs.  Max 4 tabs/week 6/14/21   JW Dhillon CNP       Future Appointments   Date Time Provider Shireen Charles   9/19/2022 11:15 AM Rin Dos Santos MD [address]   10/17/2022 10:45 AM Les Moores, APRN - CNP [place]  [facility]   10/25/2022  1:00 PM Gen Azar MD Neuro  [facility]    and   General Assessment    Do you have any symptoms that are causing concern?: Yes  Progression since Onset: Gradually Worsening  Reported Symptoms: Pain (Comment: generalized)

## 2022-08-24 NOTE — CARE COORDINATION
Patient called back stating he is having a hard time moving around , \"too much pain\". PCP appointment offered or suggested the walk in clinic, patient declined. Stated he will lay down for awhile if no improvement he thinks he should have his friend take him to the ED. He said that some lady was at his home earlier and will be sending him some additional information to him regarding Hemant Whipple. He said that she did not leave her contact information. ACM will plan a call in 1 day to check on patient.

## 2022-08-25 ENCOUNTER — CARE COORDINATION (OUTPATIENT)
Dept: CARE COORDINATION | Age: 82
End: 2022-08-25

## 2022-08-25 NOTE — CARE COORDINATION
Attempting to reach patient for a follow up care coordination call regarding any needs, questions or concerns.   Isabella Shaikh, [address]

## 2022-08-30 ENCOUNTER — CARE COORDINATION (OUTPATIENT)
Dept: CARE COORDINATION | Age: 82
End: 2022-08-30

## 2022-08-30 NOTE — CARE COORDINATION
[facility] received  asking for a return call, AC called back and had to leave a  for patient to call [facility] back. Patient called back with complaints of on going pain in hip and knees. ACM asked if he has picked up the gabapentin. Patient replied that he has not received it. [facility] encouraged patient to call his pharmacy and check on this RX.   [facility] asked patient to get his medications out and verify that he did not have it already. Patient began to read off his medications, seemed confused and did not all match what chart has. [facility] called [facility]  and requested a nurse visit to review medications. A nurse will go out tomorrow and check medications, [facility] provided her number for any needs during this visit. Patient called back and stated that the pharmacist told him that the Neurontin is in his bubble pack. Patient would like to know if he should have an increase of the dosage, he is currently taking 100 mg 3 x daily. ACM will route to PCP for recommendations.

## 2022-09-01 NOTE — CARE COORDINATION
PCP recommendations were given to patient as follows:     I want him to try extra strength Tylenol 2 tabs every 6 hours as needed for pain to see if that helps with the pain and keep us updated. Reminder for his PM procedure and that he needs to have someone drive him and stay with him at the appointment.

## 2022-09-07 ENCOUNTER — TELEPHONE (OUTPATIENT)
Dept: PAIN MANAGEMENT | Age: 82
End: 2022-09-07

## 2022-09-07 ENCOUNTER — CARE COORDINATION (OUTPATIENT)
Dept: CARE COORDINATION | Age: 82
End: 2022-09-07

## 2022-09-07 NOTE — TELEPHONE ENCOUNTER
Hui RN from [address] calls to check on patient's POC. I advised he is scheduled 9/19 arrive 1015 for a procedure. I reminded Jessica Caraballo the patient is to hold his Plavix morning of procedure and he MUST have a  with him the entire time. Jessica Caraballo will make sure patient and his caregivers comply. I also told Jessica Caraballo patient is to arrive at the outpatient surgery entrance and to look for Pain Clinic check in; she will call with any further questions.

## 2022-09-07 NOTE — CARE COORDINATION
Attempting to reach patient for a follow up care coordination call regarding any needs, questions or concerns.   Fracisco Serrano, [address]

## 2022-09-09 ENCOUNTER — CARE COORDINATION (OUTPATIENT)
Dept: CARE COORDINATION | Age: 82
End: 2022-09-09

## 2022-09-09 NOTE — CARE COORDINATION
Patient called asking when his PM appointment is. AC gave details and again reminded patient that he needs to have someone bring him, stay with him and take him home. Patient reports that he has RPM through insurance.

## 2022-09-13 ENCOUNTER — CARE COORDINATION (OUTPATIENT)
Dept: CARE COORDINATION | Age: 82
End: 2022-09-13

## 2022-09-13 NOTE — CARE COORDINATION
Attempting to reach patient for a follow up care coordination call. Left detailed message for the patient to return my call with any questions or concerns.   Zeinab Montanez RN, ambulatory care manager

## 2022-09-15 ENCOUNTER — CARE COORDINATION (OUTPATIENT)
Dept: CARE COORDINATION | Age: 82
End: 2022-09-15

## 2022-09-15 NOTE — CARE COORDINATION
ACM spoke with patient to remind him again about his pain management appointment on 9.19.22. He remembered and stated that his neighbor agreed to take him. Patient denied any needs today. ACM will follow up with patient next week to check on needs.

## 2022-09-16 ENCOUNTER — CARE COORDINATION (OUTPATIENT)
Dept: CARE COORDINATION | Age: 82
End: 2022-09-16

## 2022-09-16 NOTE — CARE COORDINATION
AMBER received a call from [facility] nurse, Bennett Adame. She wanted to update [facility] on concerns related to patients pain and being able to live alone. AMBER explained that patient does have a PM appointment next week and is supposed to have a neighbor taking him. Also agreed with patient having chronic pain and possibility of assisted living. AMBER explained that patient will mention assisted living and then back down saying he does not want to sell his home. Bennett Adame will have a  go out and evaluate patients home needs and explain assisted living in detail to patient. AMBER asked that she provide LSW with [facility] contact information.

## 2022-09-19 ENCOUNTER — HOSPITAL ENCOUNTER (OUTPATIENT)
Dept: PAIN MANAGEMENT | Age: 82
Discharge: HOME | End: 2022-09-19
Payer: MEDICARE

## 2022-09-19 ENCOUNTER — CARE COORDINATION (OUTPATIENT)
Dept: CARE COORDINATION | Age: 82
End: 2022-09-19

## 2022-09-19 ENCOUNTER — HOSPITAL ENCOUNTER (OUTPATIENT)
Dept: GENERAL RADIOLOGY | Age: 82
Discharge: HOME | End: 2022-09-21
Payer: MEDICARE

## 2022-09-19 VITALS
BODY MASS INDEX: 28.09 KG/M2 | SYSTOLIC BLOOD PRESSURE: 107 MMHG | HEART RATE: 51 BPM | OXYGEN SATURATION: 95 % | HEIGHT: 67 IN | TEMPERATURE: 98.8 F | DIASTOLIC BLOOD PRESSURE: 62 MMHG | RESPIRATION RATE: 17 BRPM | WEIGHT: 179 LBS

## 2022-09-19 DIAGNOSIS — R52 PAIN MANAGEMENT: ICD-10-CM

## 2022-09-19 DIAGNOSIS — M47.817 LUMBOSACRAL SPONDYLOSIS WITHOUT MYELOPATHY: Chronic | ICD-10-CM

## 2022-09-19 PROCEDURE — 2500000003 HC RX 250 WO HCPCS: Performed by: ANESTHESIOLOGY

## 2022-09-19 PROCEDURE — 99152 MOD SED SAME PHYS/QHP 5/>YRS: CPT | Performed by: ANESTHESIOLOGY

## 2022-09-19 PROCEDURE — 3209999900 FLUORO FOR SURGICAL PROCEDURES

## 2022-09-19 PROCEDURE — 64493 INJ PARAVERT F JNT L/S 1 LEV: CPT | Performed by: ANESTHESIOLOGY

## 2022-09-19 PROCEDURE — 6360000002 HC RX W HCPCS: Performed by: ANESTHESIOLOGY

## 2022-09-19 PROCEDURE — 64494 INJ PARAVERT F JNT L/S 2 LEV: CPT

## 2022-09-19 PROCEDURE — 64493 INJ PARAVERT F JNT L/S 1 LEV: CPT

## 2022-09-19 PROCEDURE — 64494 INJ PARAVERT F JNT L/S 2 LEV: CPT | Performed by: ANESTHESIOLOGY

## 2022-09-19 PROCEDURE — 64495 INJ PARAVERT F JNT L/S 3 LEV: CPT

## 2022-09-19 RX ORDER — BUPIVACAINE HYDROCHLORIDE 5 MG/ML
INJECTION, SOLUTION EPIDURAL; INTRACAUDAL
Status: COMPLETED | OUTPATIENT
Start: 2022-09-19 | End: 2022-09-19

## 2022-09-19 RX ORDER — MIDAZOLAM HYDROCHLORIDE 1 MG/ML
INJECTION INTRAMUSCULAR; INTRAVENOUS
Status: COMPLETED | OUTPATIENT
Start: 2022-09-19 | End: 2022-09-19

## 2022-09-19 RX ORDER — LIDOCAINE HYDROCHLORIDE 10 MG/ML
INJECTION, SOLUTION EPIDURAL; INFILTRATION; INTRACAUDAL; PERINEURAL
Status: COMPLETED | OUTPATIENT
Start: 2022-09-19 | End: 2022-09-19

## 2022-09-19 RX ADMIN — LIDOCAINE HYDROCHLORIDE 5 ML: 10 INJECTION, SOLUTION EPIDURAL; INFILTRATION; INTRACAUDAL; PERINEURAL at 11:49

## 2022-09-19 RX ADMIN — MIDAZOLAM 1 MG: 1 INJECTION INTRAMUSCULAR; INTRAVENOUS at 11:49

## 2022-09-19 RX ADMIN — BUPIVACAINE HYDROCHLORIDE 6 ML: 5 INJECTION, SOLUTION EPIDURAL; INTRACAUDAL; PERINEURAL at 11:49

## 2022-09-19 ASSESSMENT — PAIN - FUNCTIONAL ASSESSMENT
PAIN_FUNCTIONAL_ASSESSMENT: PREVENTS OR INTERFERES SOME ACTIVE ACTIVITIES AND ADLS
PAIN_FUNCTIONAL_ASSESSMENT: 0-10
PAIN_FUNCTIONAL_ASSESSMENT: NONE - DENIES PAIN

## 2022-09-19 ASSESSMENT — PAIN DESCRIPTION - DESCRIPTORS: DESCRIPTORS: SHARP;ACHING

## 2022-09-19 NOTE — OP NOTE
Patient Name: Farhana Merino   YOB: 1940  Room/Bed: Room/bed info not found  Medical Record Number: [id number]  Date: 9/19/2022       Sedation/ Anesthesia Plan:   intravenous sedation   as needed. Medications Planned:   midazolam (Versed) / Fentanyl  Intravenously  as needed. Preoperative Diagnosis: Lumbar spondylosis w/o myelopathy or radiculopathy  Postoperative Diagnosis: Lumbar spondylosis w/o myelopathy or radiculopathy  Blood Loss: none    Procedure Performed:  Bilateral Lumbar Medial Branch nerve Blocks at the transverse processes of L4, L5 and sacral  ala under fluoroscopy guidance    Procedure: The Patient was seen in the preop area, chart was reviewed, informed consent was obtained. Patient was taken to procedure room and was placed in prone position. Vital signs were monitored through out the  Procedure. A time out was completed. The skin over the back was prepped and draped in sterile manner. The target point was marked at the junction of Transverse process and superior articular process at the target levels. Skin and deep tissues were anesthetized with 1 % lidocaine. A 25-gauge needlele was advanced to the target spots under fluoroscopy guidance in AP / Lateral and Oblique views. Then after negative aspiration contrast dye was injected with live fluoroscopy in AP views that showed  spread of the contrast with no epidural space and no vascular runoff or intrathecal spread. Finally 0.5 ml of treatment solution 0.5 % bupivacaine  was injected at each level. The needle was removed and a Band-Aid was placed over the needle  insertion site. The patient's vital signs remained stable and the patient tolerated the procedure well.       Electronically signed by Dougie Sal MD on 9/19/2022 at 11:56 AM    SEDATION NOTE:    ASA CLASSIFICATION  3  MP   CLASSIFICATION  3    Moderate intravenous conscious sedation was supervised by Dr. Ezequiel Vera  The patient was independently monitored by a Registered Nurse assigned to the Procedure Room  Monitoring included automated blood pressure, continuous EKG, Capnography and continuous pulse oximetry. The detailed Conscious Record is permanently stored in the [employer].      The following is the conscious sedation record;  Start Time:  1147  End times:  1157  Duration:  10 minutes  MEDS GIVEN 1 MG VERSED AND 0 MCG FENTANYL

## 2022-09-19 NOTE — H&P
Pain Pre-Op H&P Note    Jessica Dawn MD    HPI: Donna Bryant  presents with   Back Pain  This is a chronic problem. The current episode started more than 1 year ago. The problem occurs constantly. The problem is unchanged. The pain is present in the lumbar spine and gluteal. The quality of the pain is described as aching, cramping and stabbing. The pain does not radiate. The pain is at a severity of 6/10. The pain is moderate. The pain is worse during the day. The symptoms are aggravated by bending, sitting, standing, twisting, position and coughing. Stiffness is present in the morning. Pertinent negatives include no bladder incontinence, bowel incontinence, chest pain or fever. He has tried heat, ice, walking, NSAIDs, bed rest and home exercises for the symptoms. The treatment provided moderate relief.      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 ([facility] 3/18/2011    d/t being hurt on the job    CKD (chronic kidney disease) stage 2, GFR 60-89 ml/min     CKD (chronic kidney disease) stage 3, GFR 30-59 ml/min ([facility]     Diverticulosis of colon     GERD (gastroesophageal reflux disease)     Impaired renal function     MRSA (methicillin resistant staph aureus) culture positive 9/23/2015    leg    Osteoarthritis of knee     Left    Parkinson disease ([facility]     Proteinuria     Skull fracture ([facility] [address] 3/18/2011    d/t 12-foot drop on the job    Temporary loss of eyesight     periodically, happened x7    [facility]       Past Surgical History:   Procedure Laterality Date    COLONOSCOPY  11/02/2012    poor prep, tubular adenoma    COLONOSCOPY  09/19/2017    diverticulosis, multiple polyps as described, spot marking done, pathology-tubular adenoma x 4    MS COLSC FLX W/RMVL OF TUMOR POLYP LESION SNARE TQ N/A 9/19/2017    COLONOSCOPY POLYPECTOMY SNARE/COLD BIOPSY with tatooing and apc transverse colon performed by Radha Grey MD at [facility] OR    SHOULDER SURGERY Right     rotator cuff       Family History   Problem Relation Age of Onset    No Known Problems Mother     No Known Problems Father        Allergies   Allergen Reactions    Dye [Iodides]      pain    Aspirin      Brain bleed           Current Outpatient Medications:     pantoprazole (PROTONIX) 40 MG tablet, TAKE 1 TABLET BY MOUTH ONCE DAILY FOR GERD, Disp: 30 tablet, Rfl: 10    isosorbide mononitrate (IMDUR) 30 MG extended release tablet, TAKE 1 TABLET BY MOUTH ONCE DAILY FOR HYPERTENSION, Disp: 30 tablet, Rfl: 10    gabapentin (NEURONTIN) 100 MG capsule, TAKE 1 CAPSULE BY MOUTH 3 TIMES DAILY FOR NERVES, Disp: 90 capsule, Rfl: 10    metoprolol tartrate (LOPRESSOR) 25 MG tablet, TAKE ONE HALF (1/2) ATBLETS = 12.5MG BY MOUTH TWICE A DAY FOR HYPERTENSION, Disp: 30 tablet, Rfl: 10    carbidopa-levodopa (SINEMET)  MG per tablet, TAKE ONE (1) TABLET BY MOUTH FOUR (4) TIMES DAILY, Disp: 120 tablet, Rfl: 11    esomeprazole (NEXIUM) 20 MG delayed release capsule, TAKE ONE (1) CAPSULE BY MOUTH ONCE DAILY IN THE MORNING BEFORE BREAKFAST, Disp: 90 capsule, Rfl: 1    rOPINIRole (REQUIP) 0.25 MG tablet, TAKE ONE (1) TABLET BY MOUTH THREE (3) TIMES DAILY, Disp: 270 tablet, Rfl: 1    atorvastatin (LIPITOR) 40 MG tablet, take 1 tablet by mouth once daily, Disp: 90 tablet, Rfl: 3    entacapone (COMTAN) 200 MG tablet, TAKE ONE (1) TABLET BY MOUTH FOUR (4) TIMES DAILY WITH SINEMET, Disp: 90 tablet, Rfl: 3    nitroGLYCERIN (NITROSTAT) 0.4 MG SL tablet, up to max of 3 total doses.  If no relief after 1 dose, call 911., Disp: 25 tablet, Rfl: 0    magnesium oxide (MAG-OX) 400 (240 Mg) MG tablet, TAKE 1 TABLET BY MOUTH ONCE DAILY, Disp: 90 tablet, Rfl: 2    diclofenac sodium (VOLTAREN) 1 % GEL, Apply 2 g topically 2 times daily as needed for Pain (joint pain), Disp: 350 g, Rfl: 0    amitriptyline (ELAVIL) 10 MG tablet, Take 1 tablet by mouth nightly, Disp: 90 tablet, Rfl: 3    clopidogrel (PLAVIX) 75 MG tablet, TAKE 1 TABLET BY MOUTH ONCE DAILY , Disp: 90 tablet, Rfl: 9    allopurinol (ZYLOPRIM) 100 MG tablet, TAKE 1 TABLET BY MOUTH ONCE DAILY , Disp: 90 tablet, Rfl: 9    acetaminophen (TYLENOL 8 HOUR ARTHRITIS PAIN) 650 MG extended release tablet, Take 1 tablet by mouth every 8 hours as needed for Pain, Disp: 90 tablet, Rfl: 5    butalbital-acetaminophen-caffeine (FIORICET, ESGIC) -40 MG per tablet, Take 1 tab prn only for severe headache. Can repeat after 4 hrs if needed. Max 2 tabs/24 hrs. Max 4 tabs/week, Disp: 28 tablet, Rfl: 3    Social History     Tobacco Use    Smoking status: Former    Smokeless tobacco: Never   Substance Use Topics    Alcohol use: No       Review of Systems:   Focused review of systems was performed, and negative as pertinent to diagnosis, except as stated in HPI. Physical Exam  Constitutional:       Appearance: Normal appearance. Pulmonary:      Effort: Pulmonary effort is normal.   Neurological:      Mental Status: alert. Psychiatric:         Attention and Perception: Attention and perception normal.         Mood and Affect: Mood and affect normal.   Cardiovascular:      Rate: Normal rate. ASA: 3          Mallampati: 3       Patient's current physical status, medications, medical history, and HPI have been reviewed and updated as appropriate on this date: 09/19/22    Risk/Benefit(s): The risks, benefits, alternatives, and potential complications have been discussed with the patient/family and informed consent has been obtained for the procedure/sedation. Diagnosis:   No diagnosis found.         Plan:   Bilateral lumbar diagnostic MBNB at L3 to joe Riley MD

## 2022-09-20 ENCOUNTER — CARE COORDINATION (OUTPATIENT)
Dept: CARE COORDINATION | Age: 82
End: 2022-09-20

## 2022-09-20 NOTE — CARE COORDINATION
Ambulatory Care Coordination Note  9/20/2022    ACC: Paolo Perez, RN    Summary Note: Spoke with patient who stated his pain management procedure went well. He is feeling a little weaker today. ACM encouraged patient to rest, procedure was less than 24 hours ago. Reminded him to eat something and stay hydrated. Patient encouraged to call with any concerns. ACM will follow up in 3-5 days. Goals Addressed                   This Visit's Progress     Community Resource Goal   Improving     Patient needs assistance with transportation     Barriers: fear of failure, lack of motivation, financial, lack of support, overwhelmed by complexity of regimen, stress, time constraints, medication side effects, and lack of education    Plan for overcoming my barriers: [facility]. will assist patient with making transportation to appointments    Confidence: 9/10    Anticipated Goal Completion Date:  05/26/22       Conditions and Symptoms   Improving     I will schedule office visits, as directed by my provider. I will keep my appointment or reschedule if I have to cancel. I will notify my provider of any barriers to my plan of care. I will follow my Zone Management tool to seek urgent or emergent care. I will notify my provider of any symptoms that indicate a worsening of my condition. Barriers: overwhelmed by complexity of regimen  Plan for overcoming my barriers: WORK WITH acm  Confidence: 7/10  Anticipated Goal Completion Date: 7.5.21                  Prior to Admission medications    Medication Sig Start Date End Date Taking?  Authorizing Provider   pantoprazole (PROTONIX) 40 MG tablet TAKE 1 TABLET BY MOUTH ONCE DAILY FOR GERD 8/23/22   Kandi Espinoza APRN - FELIPE   isosorbide mononitrate (IMDUR) 30 MG extended release tablet TAKE 1 TABLET BY MOUTH ONCE DAILY FOR HYPERTENSION 8/23/22   JW Charles - CNP   gabapentin (NEURONTIN) 100 MG capsule TAKE 1 CAPSULE BY MOUTH 3 TIMES DAILY FOR NERVES 8/23/22 9/22/22  JW Cortez CNP   metoprolol tartrate (LOPRESSOR) 25 MG tablet TAKE ONE HALF (1/2) ATBLETS = 12.5MG BY MOUTH TWICE A DAY FOR HYPERTENSION 8/23/22   JW Cortez CNP   carbidopa-levodopa (SINEMET)  MG per tablet TAKE ONE (1) TABLET BY MOUTH FOUR (4) TIMES DAILY 8/9/22   JW Cortez CNP   esomeprazole (NEXIUM) 20 MG delayed release capsule TAKE ONE (1) CAPSULE BY MOUTH ONCE DAILY IN THE MORNING BEFORE BREAKFAST 8/8/22   JW Cortez CNP   rOPINIRole (REQUIP) 0.25 MG tablet TAKE ONE (1) TABLET BY MOUTH THREE (3) TIMES DAILY 8/8/22   JW Cortez CNP   atorvastatin (LIPITOR) 40 MG tablet take 1 tablet by mouth once daily 8/4/22   JW Cortez CNP   entacapone (COMTAN) 200 MG tablet TAKE ONE (1) TABLET BY MOUTH FOUR (4) TIMES DAILY WITH SINEMET 6/21/22   Donia Lennox, MD   nitroGLYCERIN (NITROSTAT) 0.4 MG SL tablet up to max of 3 total doses. If no relief after 1 dose, call 911. 6/9/22   Denver Parks, APRN - NP   magnesium oxide (MAG-OX) 400 (240 Mg) MG tablet TAKE 1 TABLET BY MOUTH ONCE DAILY 5/23/22   JW Cortez CNP   diclofenac sodium (VOLTAREN) 1 % GEL Apply 2 g topically 2 times daily as needed for Pain (joint pain) 5/9/22   JW Burnette NP   amitriptyline (ELAVIL) 10 MG tablet Take 1 tablet by mouth nightly 4/5/22   Nafisa Manuel MD   clopidogrel (PLAVIX) 75 MG tablet TAKE 1 TABLET BY MOUTH ONCE DAILY  11/5/21   JW Andrade CNP   allopurinol (ZYLOPRIM) 100 MG tablet TAKE 1 TABLET BY MOUTH ONCE DAILY  11/5/21   JW Andrade CNP   acetaminophen (TYLENOL 8 HOUR ARTHRITIS PAIN) 650 MG extended release tablet Take 1 tablet by mouth every 8 hours as needed for Pain 6/14/21   Holli Sixto, APRN - CNP   butalbital-acetaminophen-caffeine (FIORICET, ESGIC) -40 MG per tablet Take 1 tab prn only for severe headache. Can repeat after 4 hrs if needed. Max 2 tabs/24 hrs.  Max 4 tabs/week 6/14/21   JW Miranda CNP       Future Appointments   Date Time Provider Shireen Charles   10/17/2022 10:45 AM Rahel Bounds, APRN - CNP [place]  [facility]   10/25/2022  1:00 PM Severo Ha, MD Neuro [facility]

## 2022-09-21 ENCOUNTER — CARE COORDINATION (OUTPATIENT)
Dept: CARE COORDINATION | Age: 82
End: 2022-09-21

## 2022-09-21 NOTE — CARE COORDINATION
AMBER received VM from [facility] at [facility] requesting a return call regarding patient. ACM called Collette and she has been unable to reach the patient. ACM recommended having his home nurse call her when she Is at his home next and scheduling her visit. Brittany Mehta agreed. ARMANIM encouraged her to call with any concerns.

## 2022-09-23 ENCOUNTER — CARE COORDINATION (OUTPATIENT)
Dept: CARE COORDINATION | Age: 82
End: 2022-09-23

## 2022-09-23 ENCOUNTER — TELEPHONE (OUTPATIENT)
Dept: PAIN MANAGEMENT | Age: 82
End: 2022-09-23

## 2022-09-23 NOTE — CARE COORDINATION
Ambulatory Care Coordination Note  9/23/2022    ACC: Corby Celestin, RN    Summary  Date Care Coordination Episode Started:  6.30.22    Reason patient is in Care Coordination:     PCP referral    Topics Discussed Today:     House cleaning and laundry, willing to pay out of pocket agreed to Referral to LINDSAY Ortega and [facility]el[facility].. And JEFFREY Xie LSW. Would also like more information about assisted living. Pain, improved slightly   Medications, no needs       Assessments completed today:     Fall risk  Initial assessment  Medication reconciliation  [facility]      Care Coordinator plan of care:     Send referral to LINDSAY Ortega and Adelso [facility]. And JEFFREY Xie LSW. Follow up in 7-10 days       Goals Addressed                   This Visit's Progress     Medication Management   Improving     I will take my medication as directed. I will notify my provider of any problems with medications, like adverse effects or side effects. I will notify my provider/Care Coordinator if I am unable to afford my medications. I will notify my provider for advice before I stop taking any of my medication. Barriers: overwhelmed by complexity of regimen  Plan for overcoming my barriers: work with ACM   Confidence: 9/10  Anticipated Goal Completion Date: 9.30.22              Prior to Admission medications    Medication Sig Start Date End Date Taking?  Authorizing Provider   gabapentin (NEURONTIN) 100 MG capsule TAKE 1 CAPSULE BY MOUTH 3 TIMES DAILY FOR NERVES 9/22/22 10/22/22  Viktoria Martinez APRN - CNP   pantoprazole (PROTONIX) 40 MG tablet TAKE 1 TABLET BY MOUTH ONCE DAILY FOR GERD 8/23/22   Viktoria Michelle, APRN - FELIPE   isosorbide mononitrate (IMDUR) 30 MG extended release tablet TAKE 1 TABLET BY MOUTH ONCE DAILY FOR HYPERTENSION 8/23/22   Viktoria Martinez, APRN - CNP   metoprolol tartrate (LOPRESSOR) 25 MG tablet TAKE ONE HALF (1/2) ATBLETS = 12.5MG BY MOUTH TWICE A DAY FOR HYPERTENSION 8/23/22   JW Ortiz CNP   carbidopa-levodopa (SINEMET)  MG per tablet TAKE ONE (1) TABLET BY MOUTH FOUR (4) TIMES DAILY 8/9/22   JW Ortiz CNP   esomeprazole (NEXIUM) 20 MG delayed release capsule TAKE ONE (1) CAPSULE BY MOUTH ONCE DAILY IN THE MORNING BEFORE BREAKFAST 8/8/22   JW Ortiz CNP   rOPINIRole (REQUIP) 0.25 MG tablet TAKE ONE (1) TABLET BY MOUTH THREE (3) TIMES DAILY 8/8/22   JW Ortiz CNP   atorvastatin (LIPITOR) 40 MG tablet take 1 tablet by mouth once daily 8/4/22   JW Ortiz CNP   entacapone (COMTAN) 200 MG tablet TAKE ONE (1) TABLET BY MOUTH FOUR (4) TIMES DAILY WITH SINEMET 6/21/22   Rody Spangler MD   nitroGLYCERIN (NITROSTAT) 0.4 MG SL tablet up to max of 3 total doses. If no relief after 1 dose, call 911. 6/9/22   JW Ferguson NP   magnesium oxide (MAG-OX) 400 (240 Mg) MG tablet TAKE 1 TABLET BY MOUTH ONCE DAILY 5/23/22   JW Ortiz CNP   diclofenac sodium (VOLTAREN) 1 % GEL Apply 2 g topically 2 times daily as needed for Pain (joint pain) 5/9/22   Earlean BalloonJW NP   amitriptyline (ELAVIL) 10 MG tablet Take 1 tablet by mouth nightly 4/5/22   Clara Sánchez MD   clopidogrel (PLAVIX) 75 MG tablet TAKE 1 TABLET BY MOUTH ONCE DAILY  11/5/21   Jesi Half, APRN - CNP   allopurinol (ZYLOPRIM) 100 MG tablet TAKE 1 TABLET BY MOUTH ONCE DAILY  11/5/21   Jesi HalfJW CNP   acetaminophen (TYLENOL 8 HOUR ARTHRITIS PAIN) 650 MG extended release tablet Take 1 tablet by mouth every 8 hours as needed for Pain 6/14/21   JW Ortiz CNP   butalbital-acetaminophen-caffeine (FIORICET, ESGIC) -40 MG per tablet Take 1 tab prn only for severe headache. Can repeat after 4 hrs if needed. Max 2 tabs/24 hrs.  Max 4 tabs/week 6/14/21   JW Ortiz - CNP       Future Appointments   Date Time Provider Shireen Charles   10/17/2022 10:45 AM JW Ortiz - CNP [place]  [facility]   10/25/2022  1:00 PM Lucero Diaz MD Neuro [facility]    and   General Assessment    Do you have any symptoms that are causing concern?: Yes  Progression since Onset: Unchanged  Reported Symptoms: Pain

## 2022-09-26 ENCOUNTER — CARE COORDINATION (OUTPATIENT)
Dept: CARE COORDINATION | Age: 82
End: 2022-09-26

## 2022-09-26 NOTE — CARE COORDINATION
Social resource request by [facility] stating that the patient is requesting someone to clean his house and to do his laundry. HC phoned and left a message for [facility] JUDIT and provided contact # for a return call. Care Plan  [facility]. will follow up with [facility] JUDIT and then reach out to patient with information. HC will also speak to patient regarding assisted living.

## 2022-09-26 NOTE — TELEPHONE ENCOUNTER
Called pt to go over MBB pain diary questions  Pain before MBB with activity - 7  Pain after MBB - 7  Patient states he did not do anything after his MBB  Patient reports 0% pain relief    OV scheduled 09/29/2022

## 2022-09-26 NOTE — CARE COORDINATION
HC was attempting to inform the [facility] and [facility] willing to talk to him regarding his need for housing cleaning and laundry . There was no answer, HC was unable to eave a message for the patient.       Care Plan  [facility] OF [facility]. will try to reach patient on 09/27/22

## 2022-09-27 ENCOUNTER — CARE COORDINATION (OUTPATIENT)
Dept: CARE COORDINATION | Age: 82
End: 2022-09-27

## 2022-09-27 NOTE — CARE COORDINATION
HC phoned and scheduled patient for transportation with [employer] for Thursday, 09/29/22 for his 4:40p STC[facility], Pain Mgmt appointment. Lee is scheduled to be picked up @ 3:40p and will be transported back home as well. HC phoned and shared this information with the patient, and patient states that he will be ready for his transportation when they arrive. Plan of Care  [facility] OF [place], [facility]. will follow up with patient regarding contact with [place] [facility].

## 2022-09-28 ENCOUNTER — CARE COORDINATION (OUTPATIENT)
Dept: CARE COORDINATION | Age: 82
End: 2022-09-28

## 2022-09-28 NOTE — CARE COORDINATION
Patient called ACM stating that he is still having pain. He has an appointment  with pain management tomorrow, 9.29.22. Stated he has a ride but he is frustrated that he has had no relief from his pain. AC encouraged him to attend the appointment and speak with the provider about this. He agreed.

## 2022-09-29 ENCOUNTER — CARE COORDINATION (OUTPATIENT)
Dept: CARE COORDINATION | Age: 82
End: 2022-09-29

## 2022-09-29 ENCOUNTER — HOSPITAL ENCOUNTER (OUTPATIENT)
Dept: PAIN MANAGEMENT | Age: 82
Discharge: HOME | End: 2022-09-29
Payer: MEDICARE

## 2022-09-29 VITALS
BODY MASS INDEX: 27.94 KG/M2 | WEIGHT: 178 LBS | TEMPERATURE: 97.3 F | DIASTOLIC BLOOD PRESSURE: 96 MMHG | HEART RATE: 60 BPM | SYSTOLIC BLOOD PRESSURE: 128 MMHG | HEIGHT: 67 IN | OXYGEN SATURATION: 100 %

## 2022-09-29 DIAGNOSIS — M47.817 LUMBOSACRAL SPONDYLOSIS WITHOUT MYELOPATHY: Primary | Chronic | ICD-10-CM

## 2022-09-29 DIAGNOSIS — R69 MULTIPLE COMORBID CONDITIONS: ICD-10-CM

## 2022-09-29 PROCEDURE — 99213 OFFICE O/P EST LOW 20 MIN: CPT

## 2022-09-29 PROCEDURE — 99213 OFFICE O/P EST LOW 20 MIN: CPT | Performed by: ANESTHESIOLOGY

## 2022-09-29 ASSESSMENT — ENCOUNTER SYMPTOMS
BACK PAIN: 1
EYES NEGATIVE: 1
SHORTNESS OF BREATH: 1
CHEST TIGHTNESS: 1

## 2022-09-29 ASSESSMENT — PAIN SCALES - GENERAL: PAINLEVEL_OUTOF10: 7

## 2022-09-29 NOTE — PROGRESS NOTES
The patient is a 79 y. o.  /  male. 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 ([address] 3/18/2011    d/t being hurt on the job    CKD (chronic kidney disease) stage 2, GFR 60-89 ml/min     CKD (chronic kidney disease) stage 3, GFR 30-59 ml/min ([facility])     Diverticulosis of colon     GERD (gastroesophageal reflux disease)     Impaired renal function     MRSA (methicillin resistant staph aureus) culture positive 9/23/2015    leg    Osteoarthritis of knee     Left    Parkinson disease ([address]     Proteinuria     Skull fracture ([facility] 3/18/2011    d/t 12-foot drop on the job    Temporary loss of eyesight     periodically, happened x7    Tubular adenoma of colon 2012, 2017    [facility]        Past Surgical History:   Procedure Laterality Date    COLONOSCOPY  11/02/2012    poor prep, tubular adenoma    COLONOSCOPY  09/19/2017    diverticulosis, multiple polyps as described, spot marking done, pathology-tubular adenoma x 4    [place] COLSC FLX W/RMVL OF TUMOR POLYP LESION SNARE TQ N/A 9/19/2017    COLONOSCOPY POLYPECTOMY SNARE/COLD BIOPSY with tatooing and apc transverse colon performed by Jamia Manzanares MD at [address] [facility] Right     rotator cuff       Social History     Socioeconomic History    Marital status: Single     Spouse name: None    Number of children: None    Years of education: None    Highest education level: None   Tobacco Use    Smoking status: Former    Smokeless tobacco: Never   Vaping Use    Vaping Use: Never used   Substance and Sexual Activity    Alcohol use: No    Drug use: No    Sexual activity: Not Currently     Social Determinants of Health     Financial Resource Strain: Low Risk     Difficulty of Paying Living Expenses: Not hard at all   Food Insecurity: No Food Insecurity    Worried About [address] in the Last Year: Never true    [facility] in the Last Year: Never true   Transportation Needs: No Transportation Needs    Lack of Transportation (Medical): No    Lack of Transportation (Non-Medical): No   Physical Activity: Inactive    Days of Exercise per Week: 0 days    Minutes of Exercise per Session: 0 min   Stress: Stress Concern Present    Feeling of Stress : To some extent   Social Connections: Socially Isolated    Frequency of Communication with Friends and Family: Three times a week    Frequency of Social Gatherings with Friends and Family: Three times a week    Attends Latter-day Services: Never    Active Member of Clubs or Organizations: No    Attends Club or Organization Meetings: Never    Marital Status:    Intimate Partner Violence: Not At Risk    Fear of Current or Ex-Partner: No    Emotionally Abused: No    Physically Abused: No    Sexually Abused: No   Housing Stability: Low Risk     Unable to Pay for Housing in the Last Year: No    Number of [place] in the Last Year: 1    Unstable Housing in the Last Year: No       Family History   Problem Relation Age of Onset    No Known Problems Mother     No Known Problems Father        Allergies   Allergen Reactions    Dye [Iodides]      pain    Aspirin      Brain bleed         There were no vitals filed for this visit.     Current Outpatient Medications   Medication Sig Dispense Refill    gabapentin (NEURONTIN) 100 MG capsule TAKE 1 CAPSULE BY MOUTH 3 TIMES DAILY FOR NERVES 90 capsule 2    pantoprazole (PROTONIX) 40 MG tablet TAKE 1 TABLET BY MOUTH ONCE DAILY FOR GERD 30 tablet 10    isosorbide mononitrate (IMDUR) 30 MG extended release tablet TAKE 1 TABLET BY MOUTH ONCE DAILY FOR HYPERTENSION 30 tablet 10    metoprolol tartrate (LOPRESSOR) 25 MG tablet TAKE ONE HALF (1/2) ATBLETS = 12.5MG BY MOUTH TWICE A DAY FOR HYPERTENSION 30 tablet 10    carbidopa-levodopa (SINEMET)  MG per tablet TAKE ONE (1) TABLET BY MOUTH FOUR (4) TIMES DAILY 120 tablet 11    esomeprazole (NEXIUM) 20 MG delayed release capsule TAKE ONE (1) CAPSULE BY MOUTH ONCE DAILY IN THE MORNING BEFORE BREAKFAST 90 capsule 1    rOPINIRole (REQUIP) 0.25 MG tablet TAKE ONE (1) TABLET BY MOUTH THREE (3) TIMES DAILY 270 tablet 1    atorvastatin (LIPITOR) 40 MG tablet take 1 tablet by mouth once daily 90 tablet 3    entacapone (COMTAN) 200 MG tablet TAKE ONE (1) TABLET BY MOUTH FOUR (4) TIMES DAILY WITH SINEMET 90 tablet 3    nitroGLYCERIN (NITROSTAT) 0.4 MG SL tablet up to max of 3 total doses. If no relief after 1 dose, call 911. 25 tablet 0    magnesium oxide (MAG-OX) 400 (240 Mg) MG tablet TAKE 1 TABLET BY MOUTH ONCE DAILY 90 tablet 2    diclofenac sodium (VOLTAREN) 1 % GEL Apply 2 g topically 2 times daily as needed for Pain (joint pain) 350 g 0    amitriptyline (ELAVIL) 10 MG tablet Take 1 tablet by mouth nightly 90 tablet 3    clopidogrel (PLAVIX) 75 MG tablet TAKE 1 TABLET BY MOUTH ONCE DAILY  90 tablet 9    allopurinol (ZYLOPRIM) 100 MG tablet TAKE 1 TABLET BY MOUTH ONCE DAILY  90 tablet 9    acetaminophen (TYLENOL 8 HOUR ARTHRITIS PAIN) 650 MG extended release tablet Take 1 tablet by mouth every 8 hours as needed for Pain 90 tablet 5    butalbital-acetaminophen-caffeine (FIORICET, ESGIC) -40 MG per tablet Take 1 tab prn only for severe headache. Can repeat after 4 hrs if needed. Max 2 tabs/24 hrs. Max 4 tabs/week 28 tablet 3     No current facility-administered medications for this encounter. Review of Systems   Constitutional: Negative. HENT: Negative. Eyes: Negative. Respiratory:  Positive for chest tightness and shortness of breath. Cardiovascular:  Positive for chest pain. Musculoskeletal:  Positive for back pain.        Objective:  Vital signs: (most recent): There were no vitals taken for this visit. Assessment & Plan  1. Multiple comorbid conditions        No orders of the defined types were placed in this encounter. No orders of the defined types were placed in this encounter.            Electronically signed by Sun Solis MA on 9/29/2022 at 4:22 PM 0

## 2022-09-29 NOTE — PROGRESS NOTES
The patient is a 79 y. o.  /  male.     Chief Complaint   Patient presents with    Back Pain     1st MBB unsuccessful        HPI    65-year-old gentleman with history of chronic axial lower back pain  We diagnosed him with facet mediated pain  Patient underwent first diagnostic lumbar medial branch nerve block  Today here for postprocedure follow-up  Report 80% plus improvement with improved tolerance of activity for 2 days after the procedure and then the pain returned back to the baseline    Patient is under the impression that it was supposed to last longer  Explained to patient this was a diagnostic block  We will recommend to proceed now with confirmatory nerve block  If that also provide good short-term relief then we will consider for radiofrequency ablation  If measures fail then consider for MRI lumbar spine or an epidural steroid injection    Pain level: 7      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 3/18/2011    d/t being hurt on the job    CKD (chronic kidney disease) stage 2, GFR 60-89 ml/min     CKD (chronic kidney disease) stage 3, GFR 30-59 ml/min ([facility])     Diverticulosis of colon     GERD (gastroesophageal reflux disease)     Impaired renal function     MRSA (methicillin resistant staph aureus) culture positive 9/23/2015    leg    Osteoarthritis of knee     Left    Parkinson disease ([address]     Proteinuria     Skull fracture ([facility] 3/18/2011    d/t 12-foot drop on the job    Temporary loss of eyesight     periodically, happened x7    [facility]ubular [facility] colon 2012, 2017    [facility]        Past Surgical History:   Procedure Laterality Date    COLONOSCOPY  11/02/2012    poor prep, tubular adenoma    COLONOSCOPY  09/19/2017    diverticulosis, multiple polyps as described, spot marking done, pathology-tubular adenoma x 4    [place] COLSC FLX W/RMVL OF TUMOR POLYP LESION SNARE TQ N/A 9/19/2017    COLONOSCOPY POLYPECTOMY SNARE/COLD BIOPSY with tatooing and apc transverse colon performed by Nakia Vilchis MD at [place] [address]. Right     rotator cuff       Social History     Socioeconomic History    Marital status: Single     Spouse name: None    Number of children: None    Years of education: None    Highest education level: None   Tobacco Use    Smoking status: Former    Smokeless tobacco: Never   Vaping Use    Vaping Use: Never used   Substance and Sexual Activity    Alcohol use: No    Drug use: No    Sexual activity: Not Currently     Social Determinants of Health     Financial Resource Strain: Low Risk     Difficulty of Paying Living Expenses: Not hard at all   Food Insecurity: No Food Insecurity    Worried About Running Out of Food in the Last Year: Never true    Ran Out of Food in the Last Year: Never true   Transportation Needs: No Transportation Needs    Lack of Transportation (Medical): No    Lack of Transportation (Non-Medical): No   Physical Activity: Inactive    Days of Exercise per Week: 0 days    Minutes of Exercise per Session: 0 min   Stress: Stress Concern Present    Feeling of Stress : To some extent   Social Connections: Socially Isolated    Frequency of Communication with Friends and Family: Three times a week    Frequency of Social Gatherings with Friends and Family:  Three times a week    Attends Pentecostal Services: Never    Active Member of Clubs or Organizations: No    Attends Club or Organization Meetings: Never    Marital Status:    Intimate Partner Violence: Not At Risk    Fear of Current or Ex-Partner: No    Emotionally Abused: No    Physically Abused: No    Sexually Abused: No   Housing Stability: Low Risk     Unable to Pay for Housing in the Last Year: No    Number of Places Lived in the Last Year: 1    Unstable Housing in the Last Year: No       Family History   Problem Relation Age of Onset    No Known Problems Mother     No Known Problems Father        Allergies   Allergen Reactions    Dye [Iodides] Pain 90 tablet 5    butalbital-acetaminophen-caffeine (FIORICET, ESGIC) -40 MG per tablet Take 1 tab prn only for severe headache. Can repeat after 4 hrs if needed. Max 2 tabs/24 hrs. Max 4 tabs/week 28 tablet 3     No current facility-administered medications for this encounter. Review of Systems   Constitutional: Negative. Negative for fever. HENT: Negative. Eyes: Negative. Respiratory:  Positive for chest tightness and shortness of breath. Cardiovascular:  Positive for chest pain. Musculoskeletal:  Positive for back pain. Objective:  General Appearance: In no acute distress, in pain and uncomfortable. Vital signs: (most recent): Blood pressure (!) 128/96, pulse 60, temperature 97.3 °F (36.3 °C), height 5' 7\" (1.702 m), weight 178 lb (80.7 kg), SpO2 100 %. Vital signs are normal.  No fever. Output: Producing urine and producing stool. Lungs:  Normal effort. He is not in respiratory distress. Heart: Normal rate. Neurological: Patient is alert and oriented to person, place and time. Assessment & Plan  79year-old gentleman with history of chronic axial lower back pain  We diagnosed him with facet mediated pain  Patient underwent first diagnostic lumbar medial branch nerve block  Today here for postprocedure follow-up  Report 80% plus improvement with improved tolerance of activity for 2 days after the procedure and then the pain returned back to the baseline    Patient is under the impression that it was supposed to last longer  Explained to patient this was a diagnostic block  We will recommend to proceed now with confirmatory nerve block  If that also provide good short-term relief then we will consider for radiofrequency ablation  If measures fail then consider for MRI lumbar spine or an epidural steroid injection        1. Lumbosacral spondylosis without myelopathy    2.  Multiple comorbid conditions        Orders Placed This Encounter   Procedures    PA INJ DX/THER AGNT PARAVERT FACET JOINT, LUMBAR/SAC, 1ST LEVEL        No orders of the defined types were placed in this encounter.            Electronically signed by Donna Quiñones MD on 9/29/2022 at 4:46 PM

## 2022-10-03 ENCOUNTER — CARE COORDINATION (OUTPATIENT)
Dept: CARE COORDINATION | Age: 82
End: 2022-10-03

## 2022-10-03 NOTE — CARE COORDINATION
HC attempted to contact this patient and there was no answer. HC left a voicemail for patient and hopes that he will return the call soon. Plan of Care  [facility] [place]. will reach out to patient again if no response.

## 2022-10-05 ENCOUNTER — CARE COORDINATION (OUTPATIENT)
Dept: CARE COORDINATION | Age: 82
End: 2022-10-05

## 2022-10-06 ENCOUNTER — CARE COORDINATION (OUTPATIENT)
Dept: CARE COORDINATION | Age: 82
End: 2022-10-06

## 2022-10-06 NOTE — CARE COORDINATION
HC phoned and spoke with the patient. Patient stated that he is in terrible pain, his body is hurting all over. HC inquired about the patients' last visit to pain management. He stated that they just spoke to him and didn't do anything for him. Patient is very displeased and tired of paining. HC informed the patient that she will speak with Trever Sandoval/AMBER for suggestions for the patient.

## 2022-10-06 NOTE — CARE COORDINATION
AMBER received VM from [facility] as a FYI stating that their LSW has attempted to speak with patient about moving to assisted living but patient is not interested due to owning several houses and having pets. He does not want to loose them. Home health will be discharging patient soon.

## 2022-10-13 ENCOUNTER — CARE COORDINATION (OUTPATIENT)
Dept: CARE COORDINATION | Age: 82
End: 2022-10-13

## 2022-10-13 NOTE — CARE COORDINATION
[facility]. phoned and arranged transportation for patients' appointment to  his appointment with [employer], on Monday, 10/17/22 @ 10:30a. His  time will be @ 9:30a by [facility]. Transportation is also scheduled for Tuesday 10/25/22, for a 1p appointment,  @ . 's/Neuro. [employer] will pick the patient up @ 12:15p.    [facility]. phoned the patient and shared this information with the patient and   will follow up with the patient early next week. Plan of Care  [facility]. will contact the patient to continue the ACP conversation.

## 2022-10-13 NOTE — CARE COORDINATION
ACM spoke with patient, reminded him of his appt with PCP next week. He asked for help with a ride. Will forward to ZEB Thomas. Patient continues to suffer from Chronic pain, has an appt with PM for a block in 2 weeks, understands he needs a ride, plans to ask his neighbor. ACM is going to discuss a palliative care referral with PCP, patient is open to trying this.

## 2022-10-18 ENCOUNTER — CARE COORDINATION (OUTPATIENT)
Dept: CARE COORDINATION | Age: 82
End: 2022-10-18

## 2022-10-18 DIAGNOSIS — R53.1 GENERALIZED WEAKNESS: ICD-10-CM

## 2022-10-18 DIAGNOSIS — N18.31 STAGE 3A CHRONIC KIDNEY DISEASE (HCC): Primary | ICD-10-CM

## 2022-10-18 DIAGNOSIS — G20 PARKINSON DISEASE (HCC): ICD-10-CM

## 2022-10-18 NOTE — CARE COORDINATION
Ambulatory Care Coordination Note  10/18/2022    ACC: Carine Eldridge, RN  Summary  Date Care Coordination Episode Started: 6.30.22    Reason patient is in Care Coordination:     PCP referral     Topics Discussed Today:     Pain management, understands he needs a ride to his upcoming appt. Missed PCP appointment, patient stated he was  just too weak to come in. Palliative referral, patient is still interested in a referral, ACM will discuss with PCP. Medications, no needs per patient. Assessments completed today:     Fall risk  Initial assessment  Medication reconciliation  [facility]      Care Coordinator plan of care: Follow up with patient regarding referral, reminders for appointments. Offered patient enrollment in the Remote Patient Monitoring (RPM) program for in-home monitoring: Patient declined, stated he has this already. Goals Addressed                   This Visit's Progress     Community Resource Goal   On track     Patient needs assistance with transportation     Barriers: fear of failure, lack of motivation, financial, lack of support, overwhelmed by complexity of regimen, stress, time constraints, medication side effects, and lack of education    Plan for overcoming my barriers: [facility]. will assist patient with making transportation to appointments    Confidence: 9/10    Anticipated Goal Completion Date:  05/26/22       Conditions and Symptoms   No change     I will schedule office visits, as directed by my provider. I will keep my appointment or reschedule if I have to cancel. I will notify my provider of any barriers to my plan of care. I will follow my Zone Management tool to seek urgent or emergent care. I will notify my provider of any symptoms that indicate a worsening of my condition.     Barriers: overwhelmed by complexity of regimen  Plan for overcoming my barriers: WORK WITH acm  Confidence: 7/10  Anticipated Goal Completion Date: 7.5.21                  Prior to Admission medications    Medication Sig Start Date End Date Taking? Authorizing Provider   gabapentin (NEURONTIN) 100 MG capsule TAKE 1 CAPSULE BY MOUTH 3 TIMES DAILY FOR NERVES 9/22/22 10/22/22  JW Ghosh CNP   pantoprazole (PROTONIX) 40 MG tablet TAKE 1 TABLET BY MOUTH ONCE DAILY FOR GERD 8/23/22   JW Ghosh CNP   isosorbide mononitrate (IMDUR) 30 MG extended release tablet TAKE 1 TABLET BY MOUTH ONCE DAILY FOR HYPERTENSION 8/23/22   JW Ghosh CNP   metoprolol tartrate (LOPRESSOR) 25 MG tablet TAKE ONE HALF (1/2) ATBLETS = 12.5MG BY MOUTH TWICE A DAY FOR HYPERTENSION 8/23/22   JW Ghosh CNP   carbidopa-levodopa (SINEMET)  MG per tablet TAKE ONE (1) TABLET BY MOUTH FOUR (4) TIMES DAILY 8/9/22   JW Ghosh CNP   esomeprazole (NEXIUM) 20 MG delayed release capsule TAKE ONE (1) CAPSULE BY MOUTH ONCE DAILY IN THE MORNING BEFORE BREAKFAST 8/8/22   JW Ghosh CNP   rOPINIRole (REQUIP) 0.25 MG tablet TAKE ONE (1) TABLET BY MOUTH THREE (3) TIMES DAILY 8/8/22   JW Ghosh CNP   atorvastatin (LIPITOR) 40 MG tablet take 1 tablet by mouth once daily 8/4/22   JW Ghosh CNP   entacapone (COMTAN) 200 MG tablet TAKE ONE (1) TABLET BY MOUTH FOUR (4) TIMES DAILY WITH SINEMET 6/21/22   Chaya Gutierrez MD   nitroGLYCERIN (NITROSTAT) 0.4 MG SL tablet up to max of 3 total doses.  If no relief after 1 dose, call 911. 6/9/22   JW Parikh NP   magnesium oxide (MAG-OX) 400 (240 Mg) MG tablet TAKE 1 TABLET BY MOUTH ONCE DAILY 5/23/22   JW Ghosh CNP   diclofenac sodium (VOLTAREN) 1 % GEL Apply 2 g topically 2 times daily as needed for Pain (joint pain) 5/9/22   JW Castillo NP   amitriptyline (ELAVIL) 10 MG tablet Take 1 tablet by mouth nightly 4/5/22   Kurt Santo MD   clopidogrel (PLAVIX) 75 MG tablet TAKE 1 TABLET BY MOUTH ONCE DAILY  11/5/21   Janine Hidalgo, APRN - CNP allopurinol (ZYLOPRIM) 100 MG tablet TAKE 1 TABLET BY MOUTH ONCE DAILY  11/5/21   JW Lacey CNP   acetaminophen (TYLENOL 8 HOUR ARTHRITIS PAIN) 650 MG extended release tablet Take 1 tablet by mouth every 8 hours as needed for Pain 6/14/21   JW Lazo CNP   butalbital-acetaminophen-caffeine (FIORICET, ESGIC) -40 MG per tablet Take 1 tab prn only for severe headache. Can repeat after 4 hrs if needed. Max 2 tabs/24 hrs.  Max 4 tabs/week 6/14/21   JW Lazo CNP       Future Appointments   Date Time Provider Shireen Araceli   10/24/2022 12:00 PM Ashlyn Naranjo MD  [facility]   10/25/2022  1:00 PM Melanie Dodd MD Neuro [facility]    and   General Assessment    Do you have any symptoms that are causing concern?: Yes  Progression since Onset: Gradually Worsening  Reported Symptoms: Weakness

## 2022-10-19 ENCOUNTER — CARE COORDINATION (OUTPATIENT)
Dept: CARE COORDINATION | Age: 82
End: 2022-10-19

## 2022-10-19 NOTE — CARE COORDINATION
HC attempted to contact the patient to remind him of his appointments for   next week . There was no answer,  HC attempted to leave a message for the   patient but there was no answer.     Plan of Care  [facility]. will attempt to reach out to patient again this week

## 2022-10-21 ENCOUNTER — CARE COORDINATION (OUTPATIENT)
Dept: CARE COORDINATION | Age: 82
End: 2022-10-21

## 2022-10-21 NOTE — CARE COORDINATION
[facility]. phoned and spoke to the patient and reminded him of his appointment for pain management on Monday, 10/24/22. [facility] also reminded him of his appointment @ [facility] to see the neurologist on 10/25/22, @ 1p. He is scheduled to be  picked up @ 12:15p.     Plan of Care  [facility] will follow up with the patient regarding his transportation

## 2022-10-24 ENCOUNTER — CARE COORDINATION (OUTPATIENT)
Dept: CARE COORDINATION | Age: 82
End: 2022-10-24

## 2022-10-24 ENCOUNTER — HOSPITAL ENCOUNTER (OUTPATIENT)
Dept: PAIN MANAGEMENT | Age: 82
Discharge: HOME | End: 2022-10-24

## 2022-10-24 NOTE — DISCHARGE INSTRUCTIONS
Discharge Instructions following Sedation or Anesthesia:  You have  received  a sedative/anesthetic therefore, you should not consume any alcoholic beverages for minimum of 12 hours. Do not drive or operate machinery for 24 hours. Do not sign legal documents for 24 hours. Dizziness, drowsiness, and unsteadiness may occur. Rest when need to. Increase diet as tolerated. Keep up on fluids if diet allows. General Instructions:  Do not take a tub bath for 72 hours after procedure (this includes hot tubs and swimming pools). You may shower, but avoid hot water to injection site. Avoid strenuous activity TODAY especially if you experience dizziness. Remove band-aid the next day. Wash off any residual iodine   Do not use heat, heating pad, or any other heating device over the injection site for 3 days after the procedure. If you experience pain after your procedure, you may continue with your current pain medication as prescribed. (DO NOT INCREASE YOUR PAIN MEDICATION WITHOUT TALKING TO DOCTOR)  Soreness and pain at injection site is common, may use ice to reduce soreness. Please complete pain diary as instructed.      Call Fawad Beth at [phone] if you experience:   Fever, chills or temperature over 100    Vomiting, Headache, persistent stiff neck, nausea, blurred vision   Difficulty in urinating or unable to urinate with 8 hours   Increase in weakness, numbness or loss of function   Increased redness, swelling or drainage at the injection site

## 2022-10-24 NOTE — CARE COORDINATION
ACM received a concerning message regarding patient having increased weakness and not feeling well from [facility] [place]..  ACM spoke with patient who stated PM cancelled his procedure today due to insurance still pending coverage. He is not feeling well today, pain, secretions and weakness. ACM provided patient with the number to Palliative care and explained that they had tried to reach him. Patient stated that he will call today.

## 2022-10-24 NOTE — CARE COORDINATION
Patient phoned the [facility]. today stating that he has his appointment today  with Pain Management but he's extremely weak. He also stated that he   received a letter from his insurance company indicating some issue about  his procedure may not be covered. Plan of Care  [facility]. will ask Brandon Cowan to speak with patient regarding his weakness, etc...

## 2022-10-25 ENCOUNTER — TELEPHONE (OUTPATIENT)
Dept: NEUROSURGERY | Age: 82
End: 2022-10-25

## 2022-10-25 ENCOUNTER — APPOINTMENT (OUTPATIENT)
Dept: CT IMAGING | Age: 82
DRG: 853 | End: 2022-10-25
Payer: MEDICARE

## 2022-10-25 ENCOUNTER — APPOINTMENT (OUTPATIENT)
Dept: GENERAL RADIOLOGY | Age: 82
DRG: 853 | End: 2022-10-25
Payer: MEDICARE

## 2022-10-25 ENCOUNTER — CARE COORDINATION (OUTPATIENT)
Dept: CARE COORDINATION | Age: 82
End: 2022-10-25

## 2022-10-25 ENCOUNTER — HOSPITAL ENCOUNTER (INPATIENT)
Age: 82
LOS: 6 days | Discharge: NURSING, REHAB OR LONG-TERM CARE | DRG: 853 | End: 2022-11-01
Attending: EMERGENCY MEDICINE | Admitting: INTERNAL MEDICINE
Payer: MEDICARE

## 2022-10-25 DIAGNOSIS — K85.10 GALLSTONE PANCREATITIS: Primary | ICD-10-CM

## 2022-10-25 DIAGNOSIS — K80.20 SYMPTOMATIC CHOLELITHIASIS: ICD-10-CM

## 2022-10-25 LAB
ABSOLUTE EOS #: 0 K/UL (ref 0–0.4)
ABSOLUTE IMMATURE GRANULOCYTE: 0.13 K/UL (ref 0–0.3)
ABSOLUTE LYMPH #: 0.4 K/UL (ref 1–4.8)
ABSOLUTE MONO #: 0.54 K/UL (ref 0.1–0.8)
ALBUMIN SERPL-MCNC: 4.5 G/DL (ref 3.5–5.2)
ALBUMIN/GLOBULIN RATIO: 1.3 (ref 1–2.5)
ALP BLD-CCNC: 230 U/L (ref 40–129)
ALT SERPL-CCNC: 363 U/L (ref 5–41)
ANION GAP SERPL CALCULATED.3IONS-SCNC: 15 MMOL/L (ref 9–17)
AST SERPL-CCNC: 427 U/L
BASOPHILS # BLD: 1 % (ref 0–2)
BASOPHILS ABSOLUTE: 0.13 K/UL (ref 0–0.2)
BILIRUB SERPL-MCNC: 3.1 MG/DL (ref 0.3–1.2)
BUN BLDV-MCNC: 22 MG/DL (ref 8–23)
CALCIUM SERPL-MCNC: 9.4 MG/DL (ref 8.6–10.4)
CHLORIDE BLD-SCNC: 97 MMOL/L (ref 98–107)
CO2: 24 MMOL/L (ref 20–31)
CREAT SERPL-MCNC: 1.71 MG/DL (ref 0.7–1.2)
EOSINOPHILS RELATIVE PERCENT: 0 % (ref 1–4)
FLU A ANTIGEN: NEGATIVE
FLU B ANTIGEN: NEGATIVE
GFR SERPL CREATININE-BSD FRML MDRD: 39 ML/MIN/1.73M2
GLUCOSE BLD-MCNC: 148 MG/DL (ref 70–99)
GLUCOSE BLD-MCNC: 167 MG/DL
GLUCOSE BLD-MCNC: 167 MG/DL (ref 75–110)
HCT VFR BLD CALC: 45 % (ref 40.7–50.3)
HEMOGLOBIN: 15.1 G/DL (ref 13–17)
IMMATURE GRANULOCYTES: 1 %
LIPASE: 845 U/L (ref 13–60)
LYMPHOCYTES # BLD: 3 % (ref 24–44)
MCH RBC QN AUTO: 31.9 PG (ref 25.2–33.5)
MCHC RBC AUTO-ENTMCNC: 33.6 G/DL (ref 28.4–34.8)
MCV RBC AUTO: 94.9 FL (ref 82.6–102.9)
MONOCYTES # BLD: 4 % (ref 1–7)
MORPHOLOGY: ABNORMAL
NRBC AUTOMATED: 0 PER 100 WBC
PDW BLD-RTO: 14 % (ref 11.8–14.4)
PLATELET # BLD: 204 K/UL (ref 138–453)
PMV BLD AUTO: 9.1 FL (ref 8.1–13.5)
POTASSIUM SERPL-SCNC: 4.2 MMOL/L (ref 3.7–5.3)
RBC # BLD: 4.74 M/UL (ref 4.21–5.77)
SARS-COV-2, RAPID: NOT DETECTED
SEG NEUTROPHILS: 91 % (ref 36–66)
SEGMENTED NEUTROPHILS ABSOLUTE COUNT: 12.2 K/UL (ref 1.8–7.7)
SODIUM BLD-SCNC: 136 MMOL/L (ref 135–144)
SPECIMEN DESCRIPTION: NORMAL
TOTAL PROTEIN: 8 G/DL (ref 6.4–8.3)
TROPONIN, HIGH SENSITIVITY: 61 NG/L (ref 0–22)
WBC # BLD: 13.4 K/UL (ref 3.5–11.3)

## 2022-10-25 PROCEDURE — 84484 ASSAY OF TROPONIN QUANT: CPT

## 2022-10-25 PROCEDURE — 87804 INFLUENZA ASSAY W/OPTIC: CPT

## 2022-10-25 PROCEDURE — 96374 THER/PROPH/DIAG INJ IV PUSH: CPT

## 2022-10-25 PROCEDURE — 83690 ASSAY OF LIPASE: CPT

## 2022-10-25 PROCEDURE — 82947 ASSAY GLUCOSE BLOOD QUANT: CPT

## 2022-10-25 PROCEDURE — 93005 ELECTROCARDIOGRAM TRACING: CPT | Performed by: EMERGENCY MEDICINE

## 2022-10-25 PROCEDURE — 80053 COMPREHEN METABOLIC PANEL: CPT

## 2022-10-25 PROCEDURE — 81001 URINALYSIS AUTO W/SCOPE: CPT

## 2022-10-25 PROCEDURE — 74176 CT ABD & PELVIS W/O CONTRAST: CPT

## 2022-10-25 PROCEDURE — 71046 X-RAY EXAM CHEST 2 VIEWS: CPT

## 2022-10-25 PROCEDURE — 85025 COMPLETE CBC W/AUTO DIFF WBC: CPT

## 2022-10-25 PROCEDURE — 87635 SARS-COV-2 COVID-19 AMP PRB: CPT

## 2022-10-25 PROCEDURE — 2580000003 HC RX 258: Performed by: EMERGENCY MEDICINE

## 2022-10-25 PROCEDURE — 87086 URINE CULTURE/COLONY COUNT: CPT

## 2022-10-25 PROCEDURE — 99285 EMERGENCY DEPT VISIT HI MDM: CPT

## 2022-10-25 RX ORDER — 0.9 % SODIUM CHLORIDE 0.9 %
500 INTRAVENOUS SOLUTION INTRAVENOUS ONCE
Status: COMPLETED | OUTPATIENT
Start: 2022-10-25 | End: 2022-10-25

## 2022-10-25 RX ADMIN — SODIUM CHLORIDE 500 ML: 9 INJECTION, SOLUTION INTRAVENOUS at 21:41

## 2022-10-25 NOTE — CARE COORDINATION
HC attempted to contact the patient today. There was no answer, HC left a message to remind  the patient of his neurology appointment today @ 1p, and his cab  @ 12:15p.     Plan of Care  [facility] [place], [facility]. will continue to assist patient with transportation needs

## 2022-10-25 NOTE — LETTER
[address] [facility], [address] [place] [address], [address] [place] [address]  Phone: [phone]  Fax: [phone]    Lilo Chambers MD        October 25, 2022     [phone] [phone]      Dear Leeann Walker:    We missed seeing you for a scheduled appointment at [address] [place] [address] with Lilo Chambers MD on 10/25/2022. We're sorry you were unable to keep your appointment and hope that you are doing well. We ask that you please call 24 hours in advance if you are unable to make your appointment, so that we can give that time to another patient in need. We care about you and the management of your healthcare and want to make sure that you follow up as recommended. To provide quality care and timely appointments to all our patients, you may be dismissed from the practice if you do not show for three (3) scheduled appointments within a 12-month period. We would like to continue treating your healthcare needs. Please call the office to reschedule your appointment, if needed.      Sincerely,        Lilo Chambers MD

## 2022-10-26 ENCOUNTER — APPOINTMENT (OUTPATIENT)
Dept: MRI IMAGING | Age: 82
DRG: 853 | End: 2022-10-26
Payer: MEDICARE

## 2022-10-26 ENCOUNTER — APPOINTMENT (OUTPATIENT)
Dept: CT IMAGING | Age: 82
DRG: 853 | End: 2022-10-26
Payer: MEDICARE

## 2022-10-26 PROBLEM — D72.825 BANDEMIA: Status: ACTIVE | Noted: 2022-10-26

## 2022-10-26 PROBLEM — K80.00 CALCULUS OF GALLBLADDER WITH ACUTE CHOLECYSTITIS WITHOUT OBSTRUCTION: Status: ACTIVE | Noted: 2022-10-26

## 2022-10-26 PROBLEM — K81.9 CHOLECYSTITIS: Status: ACTIVE | Noted: 2022-10-26

## 2022-10-26 PROBLEM — K85.10 GALLSTONE PANCREATITIS: Status: ACTIVE | Noted: 2022-10-26

## 2022-10-26 PROBLEM — Z22.322 MRSA CARRIER: Status: ACTIVE | Noted: 2022-10-26

## 2022-10-26 PROBLEM — K81.0 ACUTE CHOLECYSTITIS: Status: ACTIVE | Noted: 2022-10-26

## 2022-10-26 PROBLEM — R91.1 PULMONARY NODULE: Status: ACTIVE | Noted: 2022-10-26

## 2022-10-26 LAB
ABO/RH: NORMAL
ALBUMIN SERPL-MCNC: 3.4 G/DL (ref 3.5–5.2)
ALBUMIN/GLOBULIN RATIO: 1.1 (ref 1–2.5)
ALP BLD-CCNC: 152 U/L (ref 40–129)
ALT SERPL-CCNC: 179 U/L (ref 5–41)
ANION GAP SERPL CALCULATED.3IONS-SCNC: 7 MMOL/L (ref 9–17)
ANTIBODY SCREEN: NEGATIVE
ARM BAND NUMBER: NORMAL
AST SERPL-CCNC: 123 U/L
BILIRUB SERPL-MCNC: 3.1 MG/DL (ref 0.3–1.2)
BILIRUBIN DIRECT: 2.3 MG/DL
BILIRUBIN URINE: NEGATIVE
BILIRUBIN, INDIRECT: 0.8 MG/DL (ref 0–1)
BUN BLDV-MCNC: 17 MG/DL (ref 8–23)
C-REACTIVE PROTEIN: 45.4 MG/L (ref 0–5)
CALCIUM SERPL-MCNC: 6.6 MG/DL (ref 8.6–10.4)
CASTS UA: ABNORMAL /LPF (ref 0–8)
CHLORIDE BLD-SCNC: 110 MMOL/L (ref 98–107)
CO2: 18 MMOL/L (ref 20–31)
COLOR: ABNORMAL
CREAT SERPL-MCNC: 0.86 MG/DL (ref 0.7–1.2)
CULTURE: NORMAL
EPITHELIAL CELLS UA: ABNORMAL /HPF (ref 0–5)
EXPIRATION DATE: NORMAL
GFR SERPL CREATININE-BSD FRML MDRD: >60 ML/MIN/1.73M2
GLUCOSE BLD-MCNC: 90 MG/DL (ref 70–99)
GLUCOSE URINE: NEGATIVE
INR BLD: 1
INR BLD: 1
KETONES, URINE: NEGATIVE
LACTIC ACID, WHOLE BLOOD: 1.6 MMOL/L (ref 0.7–2.1)
LACTIC ACID, WHOLE BLOOD: 3.1 MMOL/L (ref 0.7–2.1)
LEUKOCYTE ESTERASE, URINE: NEGATIVE
NITRITE, URINE: NEGATIVE
PARTIAL THROMBOPLASTIN TIME: 23.9 SEC (ref 20.5–30.5)
PH UA: 8 (ref 5–8)
POTASSIUM SERPL-SCNC: 3.8 MMOL/L (ref 3.7–5.3)
PROTEIN UA: ABNORMAL
PROTHROMBIN TIME: 10.7 SEC (ref 9.1–12.3)
PROTHROMBIN TIME: 10.8 SEC (ref 9.1–12.3)
RBC UA: ABNORMAL /HPF (ref 0–4)
SODIUM BLD-SCNC: 135 MMOL/L (ref 135–144)
SPECIFIC GRAVITY UA: 1.01 (ref 1–1.03)
SPECIMEN DESCRIPTION: NORMAL
TOTAL PROTEIN: 6.5 G/DL (ref 6.4–8.3)
TROPONIN, HIGH SENSITIVITY: 46 NG/L (ref 0–22)
TURBIDITY: CLEAR
URINE HGB: NEGATIVE
UROBILINOGEN, URINE: NORMAL
WBC UA: ABNORMAL /HPF (ref 0–5)

## 2022-10-26 PROCEDURE — 84484 ASSAY OF TROPONIN QUANT: CPT

## 2022-10-26 PROCEDURE — 2580000003 HC RX 258: Performed by: NURSE PRACTITIONER

## 2022-10-26 PROCEDURE — 6370000000 HC RX 637 (ALT 250 FOR IP): Performed by: FAMILY MEDICINE

## 2022-10-26 PROCEDURE — 86900 BLOOD TYPING SEROLOGIC ABO: CPT

## 2022-10-26 PROCEDURE — 6360000002 HC RX W HCPCS: Performed by: INTERNAL MEDICINE

## 2022-10-26 PROCEDURE — 6370000000 HC RX 637 (ALT 250 FOR IP): Performed by: INTERNAL MEDICINE

## 2022-10-26 PROCEDURE — 85610 PROTHROMBIN TIME: CPT

## 2022-10-26 PROCEDURE — 80076 HEPATIC FUNCTION PANEL: CPT

## 2022-10-26 PROCEDURE — 71250 CT THORAX DX C-: CPT

## 2022-10-26 PROCEDURE — 86850 RBC ANTIBODY SCREEN: CPT

## 2022-10-26 PROCEDURE — 83605 ASSAY OF LACTIC ACID: CPT

## 2022-10-26 PROCEDURE — 2580000003 HC RX 258: Performed by: INTERNAL MEDICINE

## 2022-10-26 PROCEDURE — 85730 THROMBOPLASTIN TIME PARTIAL: CPT

## 2022-10-26 PROCEDURE — 87040 BLOOD CULTURE FOR BACTERIA: CPT

## 2022-10-26 PROCEDURE — 2580000003 HC RX 258: Performed by: EMERGENCY MEDICINE

## 2022-10-26 PROCEDURE — 80048 BASIC METABOLIC PNL TOTAL CA: CPT

## 2022-10-26 PROCEDURE — 99222 1ST HOSP IP/OBS MODERATE 55: CPT | Performed by: INTERNAL MEDICINE

## 2022-10-26 PROCEDURE — 86140 C-REACTIVE PROTEIN: CPT

## 2022-10-26 PROCEDURE — 1200000000 HC SEMI PRIVATE

## 2022-10-26 PROCEDURE — 36415 COLL VENOUS BLD VENIPUNCTURE: CPT

## 2022-10-26 PROCEDURE — 86901 BLOOD TYPING SEROLOGIC RH(D): CPT

## 2022-10-26 PROCEDURE — C9113 INJ PANTOPRAZOLE SODIUM, VIA: HCPCS | Performed by: INTERNAL MEDICINE

## 2022-10-26 PROCEDURE — 74181 MRI ABDOMEN W/O CONTRAST: CPT

## 2022-10-26 PROCEDURE — 6360000002 HC RX W HCPCS: Performed by: STUDENT IN AN ORGANIZED HEALTH CARE EDUCATION/TRAINING PROGRAM

## 2022-10-26 PROCEDURE — 6360000002 HC RX W HCPCS: Performed by: EMERGENCY MEDICINE

## 2022-10-26 PROCEDURE — 2580000003 HC RX 258: Performed by: STUDENT IN AN ORGANIZED HEALTH CARE EDUCATION/TRAINING PROGRAM

## 2022-10-26 RX ORDER — ENTACAPONE 200 MG/1
200 TABLET ORAL 2 TIMES DAILY
Status: DISCONTINUED | OUTPATIENT
Start: 2022-10-26 | End: 2022-11-01 | Stop reason: HOSPADM

## 2022-10-26 RX ORDER — ACETAMINOPHEN 325 MG/1
650 TABLET ORAL EVERY 6 HOURS PRN
Status: DISCONTINUED | OUTPATIENT
Start: 2022-10-26 | End: 2022-11-01 | Stop reason: HOSPADM

## 2022-10-26 RX ORDER — SODIUM CHLORIDE 9 MG/ML
INJECTION, SOLUTION INTRAVENOUS CONTINUOUS
Status: ACTIVE | OUTPATIENT
Start: 2022-10-26 | End: 2022-10-27

## 2022-10-26 RX ORDER — ONDANSETRON 2 MG/ML
4 INJECTION INTRAMUSCULAR; INTRAVENOUS EVERY 6 HOURS PRN
Status: DISCONTINUED | OUTPATIENT
Start: 2022-10-26 | End: 2022-11-01 | Stop reason: HOSPADM

## 2022-10-26 RX ORDER — SODIUM CHLORIDE 9 MG/ML
INJECTION, SOLUTION INTRAVENOUS PRN
Status: DISCONTINUED | OUTPATIENT
Start: 2022-10-26 | End: 2022-10-30 | Stop reason: HOSPADM

## 2022-10-26 RX ORDER — MORPHINE SULFATE 4 MG/ML
2 INJECTION, SOLUTION INTRAMUSCULAR; INTRAVENOUS EVERY 4 HOURS PRN
Status: DISCONTINUED | OUTPATIENT
Start: 2022-10-26 | End: 2022-11-01 | Stop reason: HOSPADM

## 2022-10-26 RX ORDER — DEXTROSE MONOHYDRATE 100 MG/ML
INJECTION, SOLUTION INTRAVENOUS CONTINUOUS PRN
Status: DISCONTINUED | OUTPATIENT
Start: 2022-10-26 | End: 2022-11-01 | Stop reason: HOSPADM

## 2022-10-26 RX ORDER — LANOLIN ALCOHOL/MO/W.PET/CERES
400 CREAM (GRAM) TOPICAL DAILY
Status: DISCONTINUED | OUTPATIENT
Start: 2022-10-26 | End: 2022-11-01 | Stop reason: HOSPADM

## 2022-10-26 RX ORDER — SODIUM CHLORIDE 0.9 % (FLUSH) 0.9 %
5-40 SYRINGE (ML) INJECTION EVERY 12 HOURS SCHEDULED
Status: DISCONTINUED | OUTPATIENT
Start: 2022-10-26 | End: 2022-11-01 | Stop reason: HOSPADM

## 2022-10-26 RX ORDER — GABAPENTIN 100 MG/1
100 CAPSULE ORAL 3 TIMES DAILY
Status: DISCONTINUED | OUTPATIENT
Start: 2022-10-26 | End: 2022-11-01 | Stop reason: HOSPADM

## 2022-10-26 RX ORDER — ACETAMINOPHEN 650 MG/1
650 SUPPOSITORY RECTAL EVERY 6 HOURS PRN
Status: DISCONTINUED | OUTPATIENT
Start: 2022-10-26 | End: 2022-11-01 | Stop reason: HOSPADM

## 2022-10-26 RX ORDER — ROPINIROLE 0.25 MG/1
0.25 TABLET, FILM COATED ORAL NIGHTLY
Status: DISCONTINUED | OUTPATIENT
Start: 2022-10-26 | End: 2022-11-01 | Stop reason: HOSPADM

## 2022-10-26 RX ORDER — METOPROLOL TARTRATE 5 MG/5ML
5 INJECTION INTRAVENOUS EVERY 6 HOURS PRN
Status: DISCONTINUED | OUTPATIENT
Start: 2022-10-26 | End: 2022-11-01 | Stop reason: HOSPADM

## 2022-10-26 RX ORDER — SODIUM CHLORIDE 0.9 % (FLUSH) 0.9 %
10 SYRINGE (ML) INJECTION PRN
Status: DISCONTINUED | OUTPATIENT
Start: 2022-10-26 | End: 2022-11-01 | Stop reason: HOSPADM

## 2022-10-26 RX ORDER — ONDANSETRON 4 MG/1
4 TABLET, ORALLY DISINTEGRATING ORAL EVERY 8 HOURS PRN
Status: DISCONTINUED | OUTPATIENT
Start: 2022-10-26 | End: 2022-11-01 | Stop reason: HOSPADM

## 2022-10-26 RX ORDER — ALLOPURINOL 100 MG/1
100 TABLET ORAL DAILY
Status: DISCONTINUED | OUTPATIENT
Start: 2022-10-26 | End: 2022-11-01 | Stop reason: HOSPADM

## 2022-10-26 RX ORDER — AMITRIPTYLINE HYDROCHLORIDE 10 MG/1
10 TABLET, FILM COATED ORAL NIGHTLY
Status: DISCONTINUED | OUTPATIENT
Start: 2022-10-26 | End: 2022-11-01 | Stop reason: HOSPADM

## 2022-10-26 RX ADMIN — MORPHINE SULFATE 2 MG: 4 INJECTION INTRAVENOUS at 07:33

## 2022-10-26 RX ADMIN — SODIUM CHLORIDE: 9 INJECTION, SOLUTION INTRAVENOUS at 02:19

## 2022-10-26 RX ADMIN — SODIUM CHLORIDE, PRESERVATIVE FREE 40 MG: 5 INJECTION INTRAVENOUS at 02:14

## 2022-10-26 RX ADMIN — SODIUM CHLORIDE, PRESERVATIVE FREE 5 ML: 5 INJECTION INTRAVENOUS at 09:00

## 2022-10-26 RX ADMIN — GABAPENTIN 100 MG: 100 CAPSULE ORAL at 15:31

## 2022-10-26 RX ADMIN — SODIUM CHLORIDE, PRESERVATIVE FREE 10 ML: 5 INJECTION INTRAVENOUS at 20:24

## 2022-10-26 RX ADMIN — AMITRIPTYLINE HYDROCHLORIDE 10 MG: 10 TABLET, FILM COATED ORAL at 21:15

## 2022-10-26 RX ADMIN — CARBIDOPA AND LEVODOPA 1 TABLET: 25; 100 TABLET ORAL at 21:15

## 2022-10-26 RX ADMIN — GABAPENTIN 100 MG: 100 CAPSULE ORAL at 21:15

## 2022-10-26 RX ADMIN — ROPINIROLE HYDROCHLORIDE 0.25 MG: 0.25 TABLET, FILM COATED ORAL at 21:15

## 2022-10-26 RX ADMIN — PIPERACILLIN AND TAZOBACTAM 3375 MG: 3; .375 INJECTION, POWDER, FOR SOLUTION INTRAVENOUS at 01:17

## 2022-10-26 RX ADMIN — ENTACAPONE 200 MG: 200 TABLET, FILM COATED ORAL at 21:15

## 2022-10-26 RX ADMIN — MAGNESIUM GLUCONATE 500 MG ORAL TABLET 400 MG: 500 TABLET ORAL at 15:31

## 2022-10-26 RX ADMIN — METOPROLOL TARTRATE 12.5 MG: 25 TABLET ORAL at 09:12

## 2022-10-26 RX ADMIN — ALLOPURINOL 100 MG: 100 TABLET ORAL at 15:31

## 2022-10-26 RX ADMIN — PIPERACILLIN AND TAZOBACTAM 4500 MG: 4; .5 INJECTION, POWDER, FOR SOLUTION INTRAVENOUS at 20:25

## 2022-10-26 ASSESSMENT — PAIN SCALES - GENERAL: PAINLEVEL_OUTOF10: 8

## 2022-10-26 ASSESSMENT — ENCOUNTER SYMPTOMS
NAUSEA: 1
VOMITING: 1
ABDOMINAL PAIN: 1
DIARRHEA: 0
TACHYPNEA: 1
COLOR CHANGE: 0
SHORTNESS OF BREATH: 0
CONSTIPATION: 0
CHEST TIGHTNESS: 0

## 2022-10-26 ASSESSMENT — PAIN DESCRIPTION - LOCATION: LOCATION: HEAD

## 2022-10-26 NOTE — CONSULTS
GENERAL SURGERY  ER CONSULT NOTE    PATIENT: Christiano Masters           : 1940  MRN: [id number]  ADMISSION DATE: 10/25/2022  8:54 PM   TODAY'S DATE: 10/26/22     ATTENDING PHYSICIAN: Matthew Schultz DO  CONSULTING PHYSICIAN: Dr. Arnold Sheridan:  \"Gallbladder pancreatitis\"    HISTORY OF PRESENT ILLNESS:  Pt is a 79 year male with history of Parkinsons, CKD, stroke, NSTEMI who presents to the [facility] emergency department where he is seen and evaluated with 3-4 days of abdominal pain, nausea, vomiting and overall feeling unwell. Pt reports the pain and fatigue began a few days ago and he has been vomiting after eating. He was taking the Boost protein shakes to try and increase his oral intake since he continued to vomit. He reports his \"bones are hurting\" and has felt more fatigued and chilled lately. Pt has also had loose stools for a few days. Pt denies having episodes like this in the past. Pt denies any history of abdominal surgeries. Pt denies fever, chest pain, shortness of breath, cough, hematuria. His friend came to visit him today and decided to take him to the hospital. Pt takes Plavix, does not currently smoke. No history of abdominal surgeries.      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 ([address] 3/18/2011    d/t being hurt on the job    CKD (chronic kidney disease) stage 2, GFR 60-89 ml/min     CKD (chronic kidney disease) stage 3, GFR 30-59 ml/min ([facility])     Diverticulosis of colon     GERD (gastroesophageal reflux disease)     Impaired renal function     MRSA (methicillin resistant staph aureus) culture positive 2015    leg    Osteoarthritis of knee     Left    Parkinson disease ([address]     Proteinuria     Skull fracture ([address] 3/18/2011    d/t 12-foot drop on the job    Temporary loss of eyesight     periodically, happened x7    Tubular adenoma of colon ,     isaiah       PAST SURGICAL HISTORY:        Procedure Laterality Date    COLONOSCOPY  2012    poor prep, tubular adenoma    COLONOSCOPY  2017    diverticulosis, multiple polyps as described, spot marking done, pathology-tubular adenoma x 4    NH COLSC FLX W/RMVL OF TUMOR POLYP LESION SNARE TQ N/A 2017    COLONOSCOPY POLYPECTOMY SNARE/COLD BIOPSY with tatooing and apc transverse colon performed by Elvia Zhong MD at [address] [facility] Right     rotator cuff       ALLERGIES:    Dye [iodides] and Aspirin    SOCIAL HISTORY   Social History     Tobacco Use    Smoking status: Former    Smokeless tobacco: Never   Vaping Use    Vaping Use: Never used   Substance Use Topics    Alcohol use: No    Drug use: No        FAMILY HISTORY:       Problem Relation Age of Onset    No Known Problems Mother     No Known Problems Father        MEDICATIONS PRIOR TO ADMISSION: See epic list  Not in a hospital admission. REVIEW OF SYSTEMS:    CONSTITUTIONAL: Fatigue, feeling unwell  HEENT: Denies changes in vision and hearing  RESP: Denies SOB and cough  CV: Denies palpitations and CP  GI: Reports abdominal pain, nausea, vomiting and diarrhea  : Denies dysuria and urinary frequency  MSK: Reports bone and joint pain  SKIN: Denies rash and pruritus  NEURO: Denies headache and syncope  PSYCH: Denies recent changes in mood. Denies anxiety and depression    PHYSICAL EXAM:    Temp  Av.3 °F (36.8 °C)  Min: 98.3 °F (36.8 °C)  Max: 98.3 °F (64.2 °C)  Systolic (34UTN), YCD:927 , Min:100 , UVM:287   Diastolic (77KAS), JDK:91, Min:63, Max:63  Pulse  Av  Min: 100  Max: 100  Resp  Av  Min: 18  Max: 18  SpO2: 95 % SpO2  Av %  Min: 95 %  Max: 95 %     General: No acute distress. A&Ox3. HEENT:  NC/AT. Conjunctiva moist without icterus. Ears are symmetric. Nares are patent. Oral mucus membranes are moist.  Neck:  Supple. No LAD. Cardiovascular:  Regular rate and rhythm. Warm, well perfused. Respiratory:  Equal chest rise bilaterally.  No wheezes, stridor, or increased work of breathing. Abdomen:  Soft, mildly tender to palpation RUQ, softly distended, no masses or hernias, no surgical scars  Neuro:  Pt has resting tremor  Extremities:  Warm, dry, and well perfused. Limbs without apparent deformity. Skin:  No rashes or lesions. LABS:  Recent Labs     10/25/22  2126   WBC 13.4*   HGB 15.1        Recent Labs     10/25/22  2115 10/25/22  2126   NA  --  136   K  --  4.2   CL  --  97*   CO2  --  24   BUN  --  22   CREATININE  --  1.71*   GLUCOSE 167 148*     Recent Labs     10/25/22  2126   *   *   ALKPHOS 230*   BILITOT 3.1*     Recent Labs     10/25/22  2126 10/26/22  0008   APTT  --  23.9   PROT 8.0  --    INR  --  1.0       IMAGING:  CT ABDOMEN PELVIS WO CONTRAST Additional Contrast? None    Result Date: 10/26/2022  Cholelithiasis and acute cholecystitis. Gallbladder ultrasound would be helpful for further characterization. 10 mm spiculated nodule right lung base. RECOMMENDATIONS: Guidelines for follow-up and management of pulmonary nodules found on abdomen CT: >8mm - immediate chest CT for further evaluation. Radiology 2017 http://pubs. rsna.org/doi/full/10.1148/radiol. [id number]     XR CHEST (2 VW)    Result Date: 10/25/2022  No acute process. ASSESSMENT   79 yr old patient presenting with abdominal pain, vomiting and feeling unwell x 3 days. CT scan significant for pericholecystic fluid, gallbladder wall thickening and cholelithiasis. Labs significant for elevated direct bilirubin, leukocytosis, lactic acidosis. PLAN  -Patient seen and evaluated.  History, physical exam, laboratory and radiographic findings reviewed and discussed and examined with Dr. Renay Morrow  -Recommend admission to medicine  -Recommend antibiotics for possible acute cholecystitis  -Will trend labs and obtain a direct bilirubin  -Will order MRCP to examine the common bile duct for retained stones or dilation   hold Plavix    Ricci Horn, DO PGY-2  10/26/22 12:56 AM    I attest that I was present in the emergency department with the resident during the patient's evaluation and agree with the description of findings and plan as dictated above.   Martha Oh MD

## 2022-10-26 NOTE — ED TRIAGE NOTES
Patient arrived to the ER room 09 via his brother dropping him off. Patient has complaints of weakness x 1 month on and off. Patient states no other symptoms besides he vomited 3-4 x last night and abdominal pain. . Patient is a parkinson's patient. Pt respirations are even and unlabored, pt is oriented X 4, speaking in complete sentences, bed is in the lowest position, call light is within reach. Will continue to monitor.

## 2022-10-26 NOTE — H&P
[facility]  Office: [address], DO, Tierra Auburn, DO, Paulette Beets, DO, Maribel David Blood, DO, Fern Lehman MD, Bhumika Sam MD, Johnnie Claude, MD, Jesi Muir MD,  Rubén Rodriguez MD, Truett Burkitt, MD, Raphael Pavon, DO, Christopher Montes MD,  Ethan Kate MD, Thanh Caceres MD, Stella Harding, DO, Jenny Rust MD, Rosemary Bustos MD, Maria M Womack, DO, Lisa Palomo MD, Spring Santana MD, Tia Moore MD, Kimberlee San MD, Edgar Vickers, DO, Bridgette Gary MD, Vanessa Richards MD, Good Shearer, CNP,  Jonah Tiwari, CNP, Fabiana Frederick, CNP, Gorge Triplett, CNP,  Mukul Marie, DNP, Marylen Net, CNP, Jayden Neff, CNP, Greta Hyatt, CNP, Josie Lowry, CNP, Yung Ferguson, CNP, Imelda Siddiqui PA-C, Lesly Wang, CNS, Josephine Yi, DNP, Joe Adrian, CNP, Jerry Montano, CNP, You Lopez, CNP         [address]    HISTORY AND PHYSICAL EXAMINATION            Date:   10/26/2022  Patient name:  Stone Landeros  Date of admission:  10/25/2022  8:54 PM  MRN:   [id number]  Account:  [de-identified]  YOB: 1940  PCP:    JW Wlaker CNP  Room:   09/09  Code Status:    Prior    Chief Complaint:     Chief Complaint   Patient presents with    Fatigue     Generalized weakness, intermittent, hx of parkinsons      Progressive abdominal pain, N and V. Generalized fatigue and body aches for 3-4 days. History of Present Illness:   Pt is a 79 year male with history of Parkinsons, CKD, stroke, NSTEMI who presents with 3-4 days of abdominal pain, nausea, vomiting and overall feeling unwell. Pt reports the pain and fatigue began a few days ago and he has been vomiting after eating. Has reported multiple loose stools, body aches, generalized fatigue. Pt denies having episodes like this in the past. Pt denies any history of abdominal surgeries.  Pt denies fever, chest pain, shortness of breath, pnd, orthopnea, leg swelling, cough, hematuria. He reports compliance to plavix. Was advised by friend to go to er after visiting him today. Patient denies any other symptoms at this time. 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 ([facility] [address] 3/18/2011    d/t being hurt on the job    CKD (chronic kidney disease) stage 2, GFR 60-89 ml/min     CKD (chronic kidney disease) stage 3, GFR 30-59 ml/min ([facility])     Diverticulosis of colon     GERD (gastroesophageal reflux disease)     Impaired renal function     MRSA (methicillin resistant staph aureus) culture positive 9/23/2015    leg    Osteoarthritis of knee     Left    Parkinson disease ([facility] [address]     Proteinuria     Skull fracture ([facility] 3/18/2011    d/t 12-foot drop on the job    Temporary loss of eyesight     periodically, happened x7    Tubular adenoma of colon 2012, 2017    mulitple        Past Surgical History:     Past Surgical History:   Procedure Laterality Date    COLONOSCOPY  11/02/2012    poor prep, tubular adenoma    COLONOSCOPY  09/19/2017    diverticulosis, multiple polyps as described, spot marking done, pathology-tubular adenoma x 4    NC COLSC FLX W/RMVL OF TUMOR POLYP LESION SNARE TQ N/A 9/19/2017    COLONOSCOPY POLYPECTOMY SNARE/COLD BIOPSY with tatooing and apc transverse colon performed by Jannette Mckinney MD at [address] [place] [address] Right     rotator cuff        Medications Prior to Admission:     Prior to Admission medications    Medication Sig Start Date End Date Taking?  Authorizing Provider   gabapentin (NEURONTIN) 100 MG capsule TAKE 1 CAPSULE BY MOUTH 3 TIMES DAILY FOR NERVES 9/22/22 10/22/22  JW Tello CNP   pantoprazole (PROTONIX) 40 MG tablet TAKE 1 TABLET BY MOUTH ONCE DAILY FOR GERD 8/23/22   JW Tello CNP   isosorbide mononitrate (IMDUR) 30 MG extended release tablet TAKE 1 TABLET BY MOUTH ONCE DAILY FOR HYPERTENSION 8/23/22   JW Tello CNP   metoprolol tartrate (LOPRESSOR) 25 MG tablet TAKE ONE HALF (1/2) ATBLETS = 12.5MG BY MOUTH TWICE A DAY FOR HYPERTENSION 8/23/22   JW Tello CNP   carbidopa-levodopa (SINEMET)  MG per tablet TAKE ONE (1) TABLET BY MOUTH FOUR (4) TIMES DAILY 8/9/22   JW Tello CNP   esomeprazole (NEXIUM) 20 MG delayed release capsule TAKE ONE (1) CAPSULE BY MOUTH ONCE DAILY IN THE MORNING BEFORE BREAKFAST 8/8/22   JW Tello CNP   rOPINIRole (REQUIP) 0.25 MG tablet TAKE ONE (1) TABLET BY MOUTH THREE (3) TIMES DAILY 8/8/22   JW Tello CNP   atorvastatin (LIPITOR) 40 MG tablet take 1 tablet by mouth once daily 8/4/22   JW Tello CNP   entacapone (COMTAN) 200 MG tablet TAKE ONE (1) TABLET BY MOUTH FOUR (4) TIMES DAILY WITH SINEMET 6/21/22   Иван Leach MD   nitroGLYCERIN (NITROSTAT) 0.4 MG SL tablet up to max of 3 total doses. If no relief after 1 dose, call 911. 6/9/22   JW Carter NP   magnesium oxide (MAG-OX) 400 (240 Mg) MG tablet TAKE 1 TABLET BY MOUTH ONCE DAILY 5/23/22   JW Tello CNP   diclofenac sodium (VOLTAREN) 1 % GEL Apply 2 g topically 2 times daily as needed for Pain (joint pain) 5/9/22   JW Lowe NP   amitriptyline (ELAVIL) 10 MG tablet Take 1 tablet by mouth nightly 4/5/22   Brant Galo MD   clopidogrel (PLAVIX) 75 MG tablet TAKE 1 TABLET BY MOUTH ONCE DAILY  11/5/21   JW Perez CNP   allopurinol (ZYLOPRIM) 100 MG tablet TAKE 1 TABLET BY MOUTH ONCE DAILY  11/5/21   JW Perez CNP   acetaminophen (TYLENOL 8 HOUR ARTHRITIS PAIN) 650 MG extended release tablet Take 1 tablet by mouth every 8 hours as needed for Pain 6/14/21   Jessica Fent, APRN - CNP   butalbital-acetaminophen-caffeine (FIORICET, ESGIC) -40 MG per tablet Take 1 tab prn only for severe headache. Can repeat after 4 hrs if needed. Max 2 tabs/24 hrs. Max 4 tabs/week 21   Yusef Jarquin, APRN - CNP        Allergies:     Dye [iodides] and Aspirin    Social History:     Tobacco:    reports that he has quit smoking. He has never used smokeless tobacco.  Alcohol:      reports no history of alcohol use. Drug Use:  reports no history of drug use. Family History:     Family History   Problem Relation Age of Onset    No Known Problems Mother     No Known Problems Father        Review of Systems:     Positive and Negative as described in HPI. ROS unrevealing other then above noted     Physical Exam:   BP (!) 174/139   Pulse 72   Temp 98.3 °F (36.8 °C) (Oral)   Resp 20   Ht 5' 7\" (1.702 m)   Wt 180 lb (81.6 kg)   SpO2 92%   BMI 28.19 kg/m²   Temp (24hrs), Av.3 °F (36.8 °C), Min:98.3 °F (36.8 °C), Max:98.3 °F (36.8 °C)    Recent Labs     10/25/22  2114   POCGLU 167*     No intake or output data in the 24 hours ending 10/26/22 0134    General Appearance: Ao3, no distress at rest. Resting. Mental status: oriented to person, place, and time  Head: normocephalic, atraumatic  Eye: no icterus, redness, pupils equal and reactive, extraocular eye movements intact, conjunctiva clear  Ear: normal external ear, no discharge, hearing intact  Nose: no drainage noted  Mouth: mucous membranes moist  Neck: supple, no carotid bruits, thyroid not palpable  Lungs: Bilateral equal air entry, clear to ausculation, no wheezing, rales or rhonchi, normal effort  Cardiovascular: normal rate, regular rhythm, no murmur, gallop, rub  Abdomen: Soft,  nondistended, RUQ tenderness, + fonseca, , guarding, no peritoneal signs, no fluid shifts, no scars or skin changes.    Neurologic: There are no new focal motor or sensory deficits, normal muscle tone and bulk, no abnormal sensation, normal speech, cranial nerves II through XII grossly intact  Skin: No gross lesions, rashes, bruising or bleeding on exposed skin area  Extremities: peripheral pulses palpable, no pedal edema or calf pain with palpation. Resting tremor.    Psych: flat affect    Investigations:      Laboratory Testing:  Recent Results (from the past 24 hour(s))   POC Glucose Fingerstick    Collection Time: 10/25/22  9:14 PM   Result Value Ref Range    POC Glucose 167 (H) 75 - 110 mg/dL   POCT glucose    Collection Time: 10/25/22  9:15 PM   Result Value Ref Range    Glucose 167 mg/dL   CMP    Collection Time: 10/25/22  9:26 PM   Result Value Ref Range    Glucose 148 (H) 70 - 99 mg/dL    BUN 22 8 - 23 mg/dL    Creatinine 1.71 (H) 0.70 - 1.20 mg/dL    Est, Glom Filt Rate 39 (L) >60 mL/min/1.73m2    Calcium 9.4 8.6 - 10.4 mg/dL    Sodium 136 135 - 144 mmol/L    Potassium 4.2 3.7 - 5.3 mmol/L    Chloride 97 (L) 98 - 107 mmol/L    CO2 24 20 - 31 mmol/L    Anion Gap 15 9 - 17 mmol/L    Alkaline Phosphatase 230 (H) 40 - 129 U/L     (H) 5 - 41 U/L     (H) <40 U/L    Total Bilirubin 3.1 (H) 0.3 - 1.2 mg/dL    Total Protein 8.0 6.4 - 8.3 g/dL    Albumin 4.5 3.5 - 5.2 g/dL    Albumin/Globulin Ratio 1.3 1.0 - 2.5   CBC with Auto Differential    Collection Time: 10/25/22  9:26 PM   Result Value Ref Range    WBC 13.4 (H) 3.5 - 11.3 k/uL    RBC 4.74 4.21 - 5.77 m/uL    Hemoglobin 15.1 13.0 - 17.0 g/dL    Hematocrit 45.0 40.7 - 50.3 %    MCV 94.9 82.6 - 102.9 fL    MCH 31.9 25.2 - 33.5 pg    MCHC 33.6 28.4 - 34.8 g/dL    RDW 14.0 11.8 - 14.4 %    Platelets 080 568 - 495 k/uL    MPV 9.1 8.1 - 13.5 fL    NRBC Automated 0.0 0.0 per 100 WBC    Immature Granulocytes 1 (H) 0 %    Seg Neutrophils 91 (H) 36 - 66 %    Lymphocytes 3 (L) 24 - 44 %    Monocytes 4 1 - 7 %    Eosinophils % 0 (L) 1 - 4 %    Basophils 1 0 - 2 %    Absolute Immature Granulocyte 0.13 0.00 - 0.30 k/uL    Segs Absolute 12.20 (H) 1.8 - 7.7 k/uL    Absolute Lymph # 0.40 (L) 1.0 - 4.8 k/uL    Absolute Mono # 0.54 0.1 - 0.8 k/uL    Absolute Eos # 0.00 0.0 - 0.4 k/uL    Basophils Absolute 0.13 0.0 - 0.2 k/uL    Morphology INCREASED BANDS PRESENT    Lipase    Collection Time: 10/25/22  9:26 PM   Result Value Ref Range    Lipase 845 (H) 13 - 60 U/L   Troponin    Collection Time: 10/25/22  9:26 PM   Result Value Ref Range    Troponin, High Sensitivity 61 (HH) 0 - 22 ng/L   COVID-19, Rapid    Collection Time: 10/25/22 10:24 PM    Specimen: Nasopharyngeal Swab   Result Value Ref Range    Specimen Description . NASOPHARYNGEAL SWAB     SARS-CoV-2, Rapid Not Detected Not Detected   Urinalysis with Microscopic    Collection Time: 10/25/22 10:25 PM   Result Value Ref Range    Color, UA Dark Yellow (A) Yellow    Turbidity UA Clear Clear    Glucose, Ur NEGATIVE NEGATIVE    Bilirubin Urine NEGATIVE NEGATIVE    Ketones, Urine NEGATIVE NEGATIVE    Specific Gravity, UA 1.013 1.005 - 1.030    Urine Hgb NEGATIVE NEGATIVE    pH, UA 8.0 5.0 - 8.0    Protein, UA NEGATIVE  Verified by sulfosalicylic acid test. (A) NEGATIVE    Urobilinogen, Urine Normal Normal    Nitrite, Urine NEGATIVE NEGATIVE    Leukocyte Esterase, Urine NEGATIVE NEGATIVE    WBC, UA PENDING /HPF    RBC, UA PENDING /HPF    Casts UA PENDING /LPF    Crystals, UA PENDING None /HPF    Epithelial Cells UA PENDING /HPF    Renal Epithelial, UA PENDING 0 /HPF    Bacteria, UA PENDING None    Mucus, UA PENDING None    Trichomonas, UA PENDING None    Amorphous, UA PENDING None    Yeast, UA PENDING None    Other Observations UA PENDING NOT REQ. RAPID INFLUENZA A/B ANTIGENS    Collection Time: 10/25/22 10:28 PM    Specimen: Nasopharyngeal   Result Value Ref Range    Flu A Antigen NEGATIVE NEGATIVE    Flu B Antigen NEGATIVE NEGATIVE   Blood Culture 1    Collection Time: 10/26/22 12:06 AM    Specimen: Blood   Result Value Ref Range    Specimen Description . BLOOD     Special Requests LEFT ARM 2ML     Culture NO GROWTH <24 HRS    Culture, Blood 2    Collection Time: 10/26/22 12:07 AM    Specimen: Blood   Result Value Ref Range    Specimen Description . BLOOD     Special Requests LEFT HAND 2.5ML     Culture NO GROWTH <24 HRS    Troponin    Collection Time: 10/26/22 12:08 AM   Result Value Ref Range    Troponin, High Sensitivity 46 (H) 0 - 22 ng/L   Lactic Acid    Collection Time: 10/26/22 12:08 AM   Result Value Ref Range    Lactic Acid, Whole Blood 3.1 (H) 0.7 - 2.1 mmol/L   APTT    Collection Time: 10/26/22 12:08 AM   Result Value Ref Range    PTT 23.9 20.5 - 30.5 sec   Protime-INR    Collection Time: 10/26/22 12:08 AM   Result Value Ref Range    Protime 10.7 9.1 - 12.3 sec    INR 1.0        Imaging/Diagnostics:  CT ABDOMEN PELVIS WO CONTRAST Additional Contrast? None    Result Date: 10/26/2022  Cholelithiasis and acute cholecystitis. Gallbladder ultrasound would be helpful for further characterization. 10 mm spiculated nodule right lung base. RECOMMENDATIONS: Guidelines for follow-up and management of pulmonary nodules found on abdomen CT: >8mm - immediate chest CT for further evaluation. Radiology 2017 http://pubs. rsna.org/doi/full/10.1148/radiol. 4517661259     XR CHEST (2 VW)    Result Date: 10/25/2022  No acute process. Assessment :      Hospital Problems             Last Modified POA    * (Principal) Cholecystitis 10/26/2022 Yes     #Sepsis secondary to acute cholecystis   #Moderate Acute Cholecystitis,   -concern for Gallstone pancreatitis  -alk phos 230, lipase 845, bilirubin 3.1, ,   -lactic 3.1, wbc 13.4 w/left shift, ,   -UA negative for bilirubin -->concern for blocked duct   -CT abdomen: Liver is hypodense. 15 mm lamellated gallstone noted. Gallbladder wall thickening noted. Nml appearing pancreas. Fat containing bilateral inguinal hernias  -2 cultures obtian in ER  -Empiric Abx, IVF, urine output, analgesia with nsaid caution given TAMARA, Protonix, US of RUQ if questionable can consider MRCP, NPO, direct bili, prepare for surgery if deemed candidate, he has known findings of large stones in past imaging. Cholestatic liver pattern and no bili in urine. MRCP may delay care. Monitor for gangrene, perforation, peritonitis, ARDS, septic shock.  -Consider early cholecystectomy or percutaneous cholecystomy . Type and screen,   -can narrow abx to metro and ceftriaxone once intial resuscitation and work up complete . Fu on cultures . Serial abdominal exams   -confirm 1 large bore IVS. Must have good Iv access before admission to floors. -ID and surgery consult         #TAMARA on CKD3  -on admission Cr 1.7, bun 22, bicarb 23,   -Baseline cr 1.4,  BPH  -last echo EF 60-65% in 2021  -UA unrevealing   -NSAID with caution, IVF, bladder scans, au, reassess in morning        #CAD  -denied cp but had a cath in 6/22 for recurrent angina. Cath report:  1. Single vessel coronary artery disease. Patent stent in ostial LAD. Mild Nonobstructive CAD otherwise. LV gram not done due to chronic renal insufficiency. No stent placed, Imdur was added and risk factor modification   -troponin on admission 61-->46, with tamara on ckd  -hx of stent on plavix  -Plavix on hold for possible intervention []  -trend troponin and ekg  -Hold imdur incase hypotension due to shock.   -resume home metoprolol       #frequent falls  -hx of CVA, parkinson's, underlying parkinson's  -carotid US in 2019 without high grade stenosis  -fall precautions, neuro checks       #Lung nodule  -seen incidentally on ct abdomen: 10 mm spiculated nodule  right lung base.   >8mm - immediate chest CT for further evaluation.  -Not seen on CT from 6/27/22  PLAN  FU on chest CT    #BPH  -bladder scans prn  -au placed for strict I and o

## 2022-10-26 NOTE — ACP (ADVANCE CARE PLANNING)
..Advance Care Planning     Advance Care Planning Activator (Inpatient)  Conversation Note      Date of ACP Conversation: 10/26/2022     Conversation Conducted with: Patient with Decision Making Capacity    ACP Activator: Ovidio Marks, [address] Decision Maker:     Current Designated Health Care Decision Maker:     Primary Decision Maker: Graciela Guevara - [phone]    Secondary Decision Maker: Gabriella Wilson - [phone]    Today we documented Decision Maker(s) consistent with ACP documents on file. Care Preferences    Ventilation: \"If you were in your present state of health and suddenly became very ill and were unable to breathe on your own, what would your preference be about the use of a ventilator (breathing machine) if it were available to you? \"      Would the patient desire the use of ventilator (breathing machine)?: yes    \"If your health worsens and it becomes clear that your chance of recovery is unlikely, what would your preference be about the use of a ventilator (breathing machine) if it were available to you? \"     Would the patient desire the use of ventilator (breathing machine)?: Yes      Resuscitation  \"CPR works best to restart the heart when there is a sudden event, like a heart attack, in someone who is otherwise healthy. Unfortunately, CPR does not typically restart the heart for people who have serious health conditions or who are very sick. \"    \"In the event your heart stopped as a result of an underlying serious health condition, would you want attempts to be made to restart your heart (answer \"yes\" for attempt to resuscitate) or would you prefer a natural death (answer \"no\" for do not attempt to resuscitate)? \" yes       [] Yes   [x] No   Educated Patient / Magdalena Ferreira regarding differences between Advance Directives and portable DNR orders.     Length of ACP Conversation in minutes:      Conversation Outcomes:  [x] ACP discussion completed  [] Existing advance directive reviewed with patient; no changes to patient's previously recorded wishes  [] New Advance Directive completed  [] Portable Do Not Rescitate prepared for Provider review and signature  [] POLST/POST/MOLST/MOST prepared for Provider review and signature      Follow-up plan:    [] Schedule follow-up conversation to continue planning  [] Referred individual to Provider for additional questions/concerns   [x] Advised patient/agent/surrogate to review completed ACP document and update if needed with changes in condition, patient preferences or care setting    [] This note routed to one or more involved healthcare providers

## 2022-10-26 NOTE — PROGRESS NOTES
SPIRITUAL CARE DEPARTMENT - [facility] [place] [facility]  PROGRESS NOTE    Shift date: 10/26/22  Shift day: Wednesday   Shift # 2    Room # 1779/3851-65   Name: Marlys Mallory                [employer]ism:  [address] of [employer]: Kristal Olivares and Etienne Strickland    Referral: Routine Visit    Admit Date & Time: 10/25/2022  8:54 PM    Assessment:  Marlys Mallory is a 79 y.o. male     Intervention:  Writer introduced self and title as . Patient appeared receptive to  presence and engaged in conversation about his health and its impact. Writer offered space for patient  to express feelings, needs, and concerns and provided a ministry presence. Patient appeared anxious and coping about his hospital stay and stated there might be a procedure done in the morning.  validated patient feelings/emotions and offered prayer for spiritual comfort/support which was accepted    Outcome:  Patient continues processing his hospital stay and expressed gratitude to . Plan:  Chaplains will remain available to offer spiritual and emotional support as needed.       Electronically signed by Gely Hendricks on 10/26/2022 at 7:35 PM.  [facility]  [phone]

## 2022-10-26 NOTE — PLAN OF CARE
Tried to check on  the patient this morning but he was getting his MRCP that was ordered per surgery, skin later unremarkable. Reaching out to neurosurgery regarding any surgical plans, for now I will start clear liquid diet for the patient    Noted surgery request to continue antibiotics but ID is recommending to monitor off antibiotics, will defer to ID.     Will restart other home meds to further plan is clear, will continue to hold AP or AC  in anticipation of surgery

## 2022-10-26 NOTE — ED NOTES
The following labs labeled with pt sticker and tubed to lab:     [x] Blue     [x] Lavender   [] on ice  [x] Green/yellow  [x] Green/black [] on ice  [] Yellow  [] Red  [] Pink      [] COVID-19 swab    [] Rapid  [] PCR  [] Flu Swab  [] Strep Swab  [] Peds Viral Panel     [] Urine Sample  [] Pelvic Cultures  [] Blood Cultures   [] Wound Cultures          Cecelia Vasquez RN  10/25/22 7015

## 2022-10-26 NOTE — ED PROVIDER NOTES
[facility]  Emergency Department  Faculty Attestation     I performed a history and physical examination of the patient and discussed management with the resident. I reviewed the residents note and agree with the documented findings and plan of care. Any areas of disagreement are noted on the chart. I was personally present for the key portions of any procedures. I have documented in the chart those procedures where I was not present during the key portions. I have reviewed the emergency nurses triage note. I agree with the chief complaint, past medical history, past surgical history, allergies, medications, social and family history as documented unless otherwise noted below. For Physician Assistant/ Nurse Practitioner cases/documentation I have personally evaluated this patient and have completed at least one if not all key elements of the E/M (history, physical exam, and MDM). Additional findings are as noted. Primary Care Physician:  JW Vinson CNP    Screenings:  [unfilled]    CHIEF COMPLAINT       Chief Complaint   Patient presents with    Fatigue     Generalized weakness, intermittent, hx of parkinsons        RECENT VITALS:   Temp: 98.3 °F (36.8 °C),  Heart Rate: 100, Resp: 18, BP: 100/63    LABS:  Labs Reviewed   POCT GLUCOSE - Normal   CULTURE, URINE   COMPREHENSIVE METABOLIC PANEL   CBC WITH AUTO DIFFERENTIAL   LIPASE   TROPONIN   URINALYSIS WITH MICROSCOPIC       Radiology  XR CHEST (2 VW)    (Results Pending)       CRITICAL CARE: There was a high probability of clinically significant/life threatening deterioration in this patient's condition which required my urgent intervention. Total critical care time was none minutes. This excludes any time for separately reportable procedures.      EKG:  EKG Interpretation    Interpreted by me    Rhythm: normal sinus   Rate: normal  Axis: normal  Ectopy: none  Conduction: Incomplete right bundle branch block pattern  ST Segments: no acute change  T Waves: no acute change  Q Waves: none    Clinical Impression: Incomplete right bundle branch block pattern    Attending Physician Additional  Notes    Patient has 2 days of progressively worsening fatigue. He is worried he is got a fall. He can only stand for a few moments at a time. He admits to thirst.  There is mild diffuse abdominal discomfort. He vomited yesterday as well as today. No diarrhea. He does have myalgias. No cough sputum hemoptysis. No sore throat or rhinorrhea. He does have mild headache but no stiff neck. No changes medications. No blood in his stools. No polyuria polydipsia. On exam he is tremulous from his Parkinson's, tachycardic, borderline blood pressure, afebrile, no respiratory distress. Normal capillary refill. Lungs are clear. Abdomen is soft with mild diffuse tenderness greatest in the left mid abdomen but no guarding rebound or mass distention or tympany. Neck is supple. Normal pupils. Motor strength appears symmetrical.  Impression is fatigue likely related to dehydration, consider infectious process or other cause. Plan is IV fluids, laboratory studies, reassess. Juliane Yoon.  Gregoria Rust MD, [facility]  Attending Emergency  Physician               Anitha Michelle MD  10/25/22 4021

## 2022-10-26 NOTE — CONSULTS
[facility] - Initial Consult Note  Today's Date and Time: 10/26/2022, 11:18 AM    Impression :   Acute cholecystitis  No biliary or pancreatic ductal dilation or choledocholithiasis on MRCP  Cholelithiasis  Pulmonary nodule  Transaminitis  Leukocytosis  TAMARA-resolved  Hx Parkinson's  Hx MRSA on leg wound 2015    Recommendations:   Continue IV Zosyn for cholecystitis   Closely monitor patient's progress  Follow-up on culture results    Medical Decision Making/Summary/Discussion:10/26/2022     Patient with acute cholelithiasis and cholecystitis  MRCP completed with no biliary or pancreatic ductal dilation or choledocholithiasis. Infection Control Recommendations   Rice Precautions    Antimicrobial Stewardship Recommendations     Discontinuation of therapy    Coordination of Outpatient Care:   Estimated Length of IV antimicrobials:TBD  Patient will need Midline Catheter Insertion: TBD  Patient will need PICC line Insertion:BD  Patient will need: Home IV , [place],  SNF,  LTAC: TBD  Patient will need outpatient wound care:    Chief complaint/reason for consultation:   Antibiotic recommendations for acute cholecystitis      History of Present Illness:   Bobbe Runner is a 79y.o.-year-old male who was initially admitted on 10/25/2022. Patient seen at the request of Dr. Cb Goyal:    This patient presented to the ED on 10/25/22 with complaints of generalized weakness as well as abdominal pain and 3-4 episodes of emesis over the past couple days. Lab work revealed elevated AST/ALT, bilirubin, lipase and alk phos. A CT abdomen revealed acute cholecystitis with cholelithiasis. A CT chest was also completed that showed a 10 mm spiculated nodule in the right lung base. General surgery was consulted and an MRCP was ordered.    MRCP findings include cholelithiasis with acute cholecystitis  No biliary or pancreatic ductal dilation and no evidence of choledocholithiasis. The patient received one dose of Zosyn. On 10/26/22      IMAGING:  CT ABDOMEN PELVIS WO CONTRAST Additional Contrast? None     Result Date: 10/26/2022  Cholelithiasis and acute cholecystitis. Gallbladder ultrasound would be helpful for further characterization. 10 mm spiculated nodule right lung base. RECOMMENDATIONS: Guidelines for follow-up and management of pulmonary nodules found on abdomen CT: >8mm - immediate chest CT for further evaluation. Radiology 2017 http://pubs. rsna.org/doi/full/10.1148/radiol. 7906194423      XR CHEST (2 VW)     Result Date: 10/25/2022  No acute process. MRCP 10/26/22  Impression   Cholelithiasis with findings suggesting acute cholecystitis. No biliary or pancreatic ductal dilatation. No evidence of   choledocholithiasis. Mild hepatic steatosis. CURRENT EVALUATION 10/26/2022  /62   Pulse 65   Temp 98.3 °F (36.8 °C) (Oral)   Resp 23   Ht 5' 7\" (1.702 m)   Wt 180 lb (81.6 kg)   SpO2 94%   BMI 28.19 kg/m²     Afebrile  VS stable on room air    Labs, X rays reviewed: 10/26/2022    BUN:17  Cr:0.89    WBC:13.4  Hb:15.1  Plat: 204    ALT:363  AST:427  Alk phos\"230  Bili:3.1  Lipase:845    CRP:45.4    Cultures:  Urine:  10/25/22: pending  Blood:  10/26/22: Pending  Sputum :    Wound:    MRSA Nares:      Imaging:  CT Chest 10/25/22        CT abd/pelvis 10/25/22      Discussed with patient, RN, family. I have personally reviewed the past medical history, past surgical history, medications, social history, and family history, and I have updated the database accordingly.   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 ([facility] [address] 3/18/2011    d/t being hurt on the job    CKD (chronic kidney disease) stage 2, GFR 60-89 ml/min     CKD (chronic kidney disease) stage 3, GFR 30-59 ml/min (HCC)     Diverticulosis of colon     GERD (gastroesophageal reflux disease)     Impaired renal function     MRSA (methicillin resistant staph aureus) culture positive 9/23/2015    leg    Osteoarthritis of knee     Left    Parkinson disease ([facility] [address]     Proteinuria     Skull fracture ([facility] [address] 3/18/2011    d/t 12-foot drop on the job    Temporary loss of eyesight     periodically, happened x7    Tubular adenoma of colon 2012, 2017    mulitple       Past Surgical  History:     Past Surgical History:   Procedure Laterality Date    COLONOSCOPY  11/02/2012    poor prep, tubular adenoma    COLONOSCOPY  09/19/2017    diverticulosis, multiple polyps as described, spot marking done, pathology-tubular adenoma x 4    SC COLSC FLX W/RMVL OF TUMOR POLYP LESION SNARE TQ N/A 9/19/2017    COLONOSCOPY POLYPECTOMY SNARE/COLD BIOPSY with tatooing and apc transverse colon performed by Jannette Mckinney MD at [address] [place] [address] Right     rotator cuff       Medications:      sodium chloride flush  5-40 mL IntraVENous 2 times per day    metoprolol tartrate  12.5 mg Oral BID       Social History:     Social History     Socioeconomic History    Marital status: Single     Spouse name: Not on file    Number of children: Not on file    Years of education: Not on file    Highest education level: Not on file   Occupational History    Not on file   Tobacco Use    Smoking status: Former    Smokeless tobacco: Never   Vaping Use    Vaping Use: Never used   Substance and Sexual Activity    Alcohol use: No    Drug use: No    Sexual activity: Not Currently   Other Topics Concern    Not on file   Social History Narrative    Not on file     Social Determinants of Health     Financial Resource Strain: Low Risk     Difficulty of Paying Living Expenses: Not hard at all   Food Insecurity: No Food Insecurity    Worried About Running Out of Food in the Last Year: Never true    Ran Out of Food in the Last Year: Never true   Transportation Needs: No Transportation Needs    Lack of Transportation (Medical): No    Lack of Transportation (Non-Medical): No   Physical Activity: Inactive    Days of Exercise per Week: 0 days    Minutes of Exercise per Session: 0 min   Stress: Stress Concern Present    Feeling of Stress : To some extent   Social Connections: Socially Isolated    Frequency of Communication with Friends and Family: Three times a week    Frequency of Social Gatherings with Friends and Family: Three times a week    Attends [employer] Services: Never    Active Member of Clubs or Organizations: No    Attends Club or Organization Meetings: Never    Marital Status:    Intimate Partner Violence: Not At Risk    Fear of Current or Ex-Partner: No    Emotionally Abused: No    Physically Abused: No    Sexually Abused: No   Housing Stability: Low Risk     Unable to Pay for Housing in the Last Year: No    Number of Places Lived in the Last Year: 1    Unstable Housing in the Last Year: No       Family History:     Family History   Problem Relation Age of Onset    No Known Problems Mother     No Known Problems Father         Allergies:   Dye [iodides] and Aspirin     Review of Systems:   Constitutional: No fevers or chills. No systemic complaints  Head: No headaches  Eyes: No double vision or blurry vision. No conjunctival inflammation. ENT: No sore throat or runny nose. . No hearing loss, tinnitus or vertigo. Cardiovascular: No chest pain or palpitations. No shortness of breath. No METZ  Lung: No shortness of breath or cough. No sputum production  Abdomen: No nausea, vomiting, diarrhea, or abdominal pain. Campos Hidden No cramps. Genitourinary: No increased urinary frequency, or dysuria. No hematuria. No suprapubic or CVA pain  Musculoskeletal: No muscle aches or pains. No joint effusions, swelling or deformities  Hematologic: No bleeding or bruising. Neurologic: No headache, weakness, numbness, or tingling. Integument: No rash, no ulcers. Psychiatric: No depression. Endocrine: No polyuria, no polydipsia, no polyphagia.     Physical Examination :   Patient Vitals for the past 8 hrs:   BP Pulse Resp SpO2   10/26/22 0912 113/62 65 -- --   10/26/22 0830 -- 73 23 94 %   10/26/22 0801 (!) 137/58 68 20 94 %   10/26/22 0601 (!) 129/57 69 29 95 %   10/26/22 0532 (!) 114/55 71 15 94 %   10/26/22 0518 (!) 115/51 69 19 95 %   10/26/22 0503 (!) 91/58 73 17 94 %   10/26/22 0432 (!) 107/53 71 17 93 %   10/26/22 0417 107/67 69 15 96 %   10/26/22 0402 94/82 73 13 96 %   10/26/22 0346 121/66 67 23 93 %   10/26/22 0331 112/78 66 23 93 %     General Appearance: Awake, alert, and in no apparent distress  Head:  Normocephalic, no trauma  Eyes: Pupils equal, round, reactive to light and accommodation; extraocular movements intact; sclera anicteric; conjunctivae pink. No embolic phenomena. ENT: Oropharynx clear, without erythema, exudate, or thrush. No tenderness of sinuses. Mouth/throat: mucosa pink and moist. No lesions. Dentition in good repair. Neck:Supple, without lymphadenopathy. Thyroid normal, No bruits. Pulmonary/Chest: Clear to auscultation, without wheezes, rales, or rhonchi. No dullness to percussion. Cardiovascular: Regular rate and rhythm without murmurs, rubs, or gallops. Abdomen: Soft, non tender. Bowel sounds normal. No organomegaly  All four Extremities: No cyanosis, clubbing, edema, or effusions. Neurologic: No gross sensory or motor deficits. Skin: Warm and dry with good turgor. No signs of peripheral arterial or venous insufficiency. No ulcerations. No open wounds.     Medical Decision Making -Laboratory:   I have independently reviewed/ordered the following labs:    CBC with Differential:   Recent Labs     10/25/22  2126   WBC 13.4*   HGB 15.1   HCT 45.0      LYMPHOPCT 3*   MONOPCT 4     BMP:   Recent Labs     10/25/22  2126 10/26/22  1040    135   K 4.2 3.8   CL 97* 110*   CO2 24 18*   BUN 22 17   CREATININE 1.71* 0.86     Hepatic Function Panel:   Recent Labs     10/25/22  2126   PROT 8.0   LABALBU 4.5   BILITOT 3.1*   ALKPHOS 230*   * *     No results for input(s): RPR in the last 72 hours. No results for input(s): HIV in the last 72 hours. No results for input(s): BC in the last 72 hours. Lab Results   Component Value Date/Time    MUCUS NOT REPORTED 02/16/2022 12:09 PM    RBC 4.74 10/25/2022 09:26 PM    RBC 5.14 02/24/2012 11:00 AM    TRICHOMONAS NOT REPORTED 02/16/2022 12:09 PM    WBC 13.4 10/25/2022 09:26 PM    YEAST NOT REPORTED 02/16/2022 12:09 PM    TURBIDITY Clear 10/25/2022 10:25 PM     Lab Results   Component Value Date/Time    CREATININE 0.86 10/26/2022 10:40 AM    GLUCOSE 90 10/26/2022 10:40 AM    GLUCOSE 97 04/24/2012 12:55 PM       Medical Decision Making-Imaging:     Narrative   EXAMINATION:   CT OF THE CHEST WITHOUT CONTRAST 10/26/2022 1:29 am       TECHNIQUE:   CT of the chest was performed without the administration of intravenous   contrast. Multiplanar reformatted images are provided for review. Automated   exposure control, iterative reconstruction, and/or weight based adjustment of   the mA/kV was utilized to reduce the radiation dose to as low as reasonably   achievable. COMPARISON:   CT abdomen and pelvis the previous day, CT chest on 06/27/2022       HISTORY:   ORDERING SYSTEM PROVIDED HISTORY: eval mass   TECHNOLOGIST PROVIDED HISTORY:   eval mass   Decision Support Exception - unselect if not a suspected or confirmed   emergency medical condition->Emergency Medical Condition (MA)       FINDINGS:   Mediastinum: No cardiomegaly is identified. No focal esophageal thickening   is seen. Small hiatal hernia. No mediastinal lymphadenopathy is identified. Calcified lymph nodes are noted. No thyroid mass. No pericardial effusion. Coronary artery stent is noted.        Lungs/pleura: Respiratory motion artifact, however there are numerous areas   of mild peribronchial nodularity seen within the right lower lobe, most of   which demonstrate small punctate benign-appearing calcifications, and   findings felt likely related to post inflammatory/infectious changes. The   described spiculated nodule on the comparison CT abdomen demonstrates a   calcification which is more conspicuous on this study, and felt very unlikely   to represent a neoplastic process. Upper Abdomen: Cholelithiasis with potential cholecystitis noted, partially   imaged as previously discussed. Soft Tissues/Bones: No axillary or supraclavicular lymphadenopathy is   identified. No acute osseous abnormality. No acute vertebral body fracture   is identified. No osseous destructive process is identified. Impression   1. The spiculated 11 mm nodule described on the CT abdomen study demonstrates   a more conspicuous central calcification, with multiple similar appearing   areas of peribronchial nodules with calcifications seen in the right lower   lobe, felt likely related to sequela of prior inflammatory/infectious   process, and very unlikely to represent a neoplastic process. No significant   change from June of 2022. Given the degree of clinical concern of these   findings, and follow-up recommendations as below, suggest repeat imaging in   approximately 12 months. 2. Minimal dependent atelectasis. 3. Other incidental findings as above. RECOMMENDATIONS:   Fleischner Society guidelines for follow-up and management of incidentally   detected pulmonary nodules:       Multiple Solid Nodules:       Nodule size greater than 8 mm   In a low-risk patient, CT at 3-6 months, then consider CT at 18-24 months. In a high-risk patient, CT at 3-6 months, then CT at 18-24 months. - Low risk patients include individuals with minimal or absent history of   smoking and other known risk factors. - High risk patients include individuals with a history or smoking or known   risk factors. Radiology 2017 http://pubs. rsna.org/doi/full/10.1148/radiol. 2390788269         Narrative   EXAMINATION:   CT OF THE ABDOMEN AND PELVIS WITHOUT CONTRAST 10/25/2022 11:38 pm       TECHNIQUE:   CT of the abdomen and pelvis was performed without the administration of   intravenous contrast. Multiplanar reformatted images are provided for review. Automated exposure control, iterative reconstruction, and/or weight based   adjustment of the mA/kV was utilized to reduce the radiation dose to as low   as reasonably achievable. COMPARISON:   None. HISTORY:   ORDERING SYSTEM PROVIDED HISTORY: eval for gallstone pancreatitis   TECHNOLOGIST PROVIDED HISTORY:   eval for gallstone pancreatitis       Decision Support Exception - unselect if not a suspected or confirmed   emergency medical condition->Emergency Medical Condition (MA)   Reason for Exam: eval for gallstone pancreatitis       FINDINGS:   Lower Chest: Lad stent/coronary artery disease. 10 mm spiculated nodule   right lung base. Organs: Liver is hypodense. 15 mm lamellated gallstone noted. Gallbladder   wall thickening noted. Adrenals and pancreas normal.       GI/Bowel: Small hiatal hernia. Small large bowel normal.  Appendix normal.       Pelvis: Fat containing bilateral inguinal hernias. Peritoneum/Retroperitoneum: Calcified plaque along the aorta and its branches. Bones/Soft Tissues: Spondylosis and multilevel vacuum disc phenomena. Slight   anterior subluxation L4-5. Impression   Cholelithiasis and acute cholecystitis. Gallbladder ultrasound would be   helpful for further characterization. 10 mm spiculated nodule right lung base. RECOMMENDATIONS:   Guidelines for follow-up and management of pulmonary nodules found on abdomen   CT:       >8mm - immediate chest CT for further evaluation. Radiology 2017 http://pubs. rsna.org/doi/full/10.1148/radiol. [id number]     Narrative   EXAMINATION:   MRI OF THE ABDOMEN WITHOUT CONTRAST AND MRCP 10/26/2022 9:47 am       TECHNIQUE:   Multiplanar multisequence MRI of the abdomen was performed without the   administration of intravenous contrast.  After initial T2 axial and coronal   images, thick slab, thin slab and 3D coronal MRCP sequences were obtained   without the administration of intravenous contrast.  MIP images are provided   for review. COMPARISON:   10/25/2022 CT abdomen/pelvis       HISTORY:   ORDERING SYSTEM PROVIDED HISTORY: cholelithiasis elevated lft's   ,?choledocholithiasis   TECHNOLOGIST PROVIDED HISTORY:   cholelithiasis elevated lft's ,?choledocholithiasis   What is the sedation requirement?->None       FINDINGS:   Gallbladder: There is cholelithiasis with a large stone measuring   approximately 19 mm in size. There is gallbladder wall thickening measuring   approximately 7 mm in thickness. There is also pericholecystic fluid. Findings can be seen with acute cholecystitis in the proper clinical setting. Bile Ducts: There is no evidence of common bile duct dilatation. Common bile   duct measures approximately 4 mm in diameter. No intrahepatic or   extrahepatic biliary ductal dilatation. No evidence of choledocholithiasis. No evidence of common bile duct stricture or obstruction. Pancreatic Duct: No pancreatic ductal dilatation or obstruction. No   pancreatic ductal stones. No evidence of pancreatic divisum. Other:  Examination not tailored for the evaluation of the solid abdominal   organs. There is mild diffuse hepatic steatosis. No evidence of   hydronephrosis. No evidence of abdominal ascites or lymphadenopathy. Visualized bowel is grossly unremarkable. Osseous structures demonstrate no   acute abnormality. Impression   Cholelithiasis with findings suggesting acute cholecystitis. No biliary or pancreatic ductal dilatation. No evidence of   choledocholithiasis. Mild hepatic steatosis.                Narrative   EXAMINATION:   TWO XRAY VIEWS OF THE CHEST       10/25/2022 9:44 pm       COMPARISON:   July 19, 2022 HISTORY:   ORDERING SYSTEM PROVIDED HISTORY: eval for pneumonia   TECHNOLOGIST PROVIDED HISTORY:   eval for pneumonia       FINDINGS:   The lungs are without acute focal process. There is no effusion or   pneumothorax. The cardiomediastinal silhouette is without acute process. The   osseous structures are without acute process. Impression   No acute process. Medical Decision [id number]:       Medical Decision Making-Other:     Note:  Labs, medications, radiologic studies were reviewed with personal review of films  Large amounts of data were reviewed  Discussed with nursing Staff, Discharge planner  Infection Control and Prevention measures reviewed  All prior entries were reviewed  Administer medications as ordered  Prognosis: [facility]  Discharge planning reviewed      Thank you for allowing us to participate in the care of this patient. Please call with questions. Fred Nick, JW - CNP    ATTESTATION:    I have discussed the case, including pertinent history and exam findings with the APRN. I have evaluated the  History, physical findings and pictures of the patient and the key elements of the encounter have been performed by me. I have reviewed the laboratory data, other diagnostic studies and discussed them with the APRN. I have updated the medical record where necessary. I agree with the assessment, plan and orders as documented by the APRN.     Debbie Wyatt MD.    Pager: [phone] - Office: [phone]

## 2022-10-26 NOTE — ED PROVIDER NOTES
Faculty Sign-Out Attestation  Handoff taken on the following patient from prior Attending Physician: Abigail Rosario    I was available and discussed any additional care issues that arose and coordinated the management plans with the resident(s) caring for the patient during my duty period. Any areas of disagreement with residents documentation of care or procedures are noted on the chart. I was personally present for the key portions of any/all procedures during my duty period. I have documented in the chart those procedures where I was not present during the key portions.     Fatigue, lft elevation, getting zosyn, ekg stable, elevation troponin, will need cardiology consult  Ct abdomen pending,   Needing admission,     Lexa Crawley DO  Attending Physician       Lexa Crawley, DO  10/26/22 0005    Ct cholecystitis, surgery following case,   admitted     Lexa Crawley,   10/26/22 BjDO jackelyn  10/26/22 1935

## 2022-10-26 NOTE — ED NOTES
The following labs labeled with pt sticker and tubed to lab:     [] Blue     [] Garry Solis   [] on ice  [] Green/yellow  [] Green/black [] on ice  [] Yellow  [] Red  [] Pink      [x] COVID-19 swab    [x] Rapid  [] PCR  [x] Flu Swab  [] Strep Swab  [] Peds Viral Panel     [x] Urine Sample  [] Pelvic Cultures  [] Blood Cultures   [] Wound Cultures          Kimberlyn Correa RN  10/25/22 [id number]

## 2022-10-26 NOTE — ED PROVIDER NOTES
[facility]  ED  Emergency Department Encounter  EmergencyMedicine Resident     Pt Name:Mina Loyola  MRN: [id number]  [place] 1940  Date of evaluation: 10/25/22  PCP:  JW Bustamante CNP    CHIEF COMPLAINT       Chief Complaint   Patient presents with    Fatigue     Generalized weakness, intermittent, hx of parkinsons        HISTORY OF PRESENT ILLNESS  (Location/Symptom, Timing/Onset, Context/Setting, Quality, Duration, Modifying Factors, Severity.)      Jameel Alexis is a 79 y.o. male who presents with weakness that started last night. Patient has a history of parkinsonism, has been evaluated before for weakness and fatigue and falling history. Patient denies a fall at this time, denies hitting his head. Patient states that he had some belly pain with some vomiting last night, describes 3-4 episodes. Patient has no nausea or vomiting at this time. Patient denies any lightheadedness or dizziness. Patient just states that he is fatigued and weak. Patient's abdominal pain not active at this time, does experience it with palpation, no other complaints of it, pain does not radiate, gotten better on its own, palpation makes it worse. Patient describes it in the right upper quadrant and suprapubic area. 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   No additional pertinent     has a past surgical history that includes Colonoscopy (11/02/2012); shoulder surgery (Right); pr colsc flx w/rmvl of tumor polyp lesion snare tq (N/A, 9/19/2017); and Colonoscopy (09/19/2017). No additional pertinent    Social History     Socioeconomic History    Marital status: Single     Spouse name: Not on file    Number of children: Not on file    Years of education: Not on file    Highest education level: Not on file   Occupational History    Not on file   Tobacco Use    Smoking status: Former    Smokeless tobacco: Never   Vaping Use    Vaping Use: Never used   Substance and Sexual Activity    Alcohol use: No    Drug use: No    Sexual activity: Not Currently   Other Topics Concern    Not on file   Social History Narrative    Not on file     Social Determinants of Health     Financial Resource Strain: Low Risk     Difficulty of Paying Living Expenses: Not hard at all   Food Insecurity: No Food Insecurity    Worried About Running Out of Food in the Last Year: Never true    [address] in the Last Year: Never true   Transportation Needs: No Transportation Needs    Lack of Transportation (Medical): No    Lack of Transportation (Non-Medical): No   Physical Activity: Inactive    Days of Exercise per Week: 0 days    Minutes of Exercise per Session: 0 min   Stress: Stress Concern Present    Feeling of Stress : To some extent   Social Connections: Socially Isolated    Frequency of Communication with Friends and Family: Three times a week    Frequency of Social Gatherings with Friends and Family:  Three times a week    Attends Yazidism Services: Never    Active Member of Clubs or Organizations: No    Attends Club or Organization Meetings: Never    Marital Status:    Intimate Partner Violence: Not At Risk    Fear of Current or Ex-Partner: No    Emotionally Abused: No    Physically Abused: No    Sexually Abused: No   Housing Stability: Low Risk     Unable to Pay for Housing in the Last Year: No    Number of [place] in the Last Year: 1    Unstable Housing in the Last Year: No       Family History   Problem Relation Age of Onset    No Known Problems Mother     No Known Problems Father        Allergies:  Dye [iodides] and Aspirin    Home Medications:  Prior to Admission medications    Medication Sig Start Date End Date Taking? Authorizing Provider   gabapentin (NEURONTIN) 100 MG capsule TAKE 1 CAPSULE BY MOUTH 3 TIMES DAILY FOR NERVES 9/22/22 10/22/22  Lawrence Specking, APRN - CNP   pantoprazole (PROTONIX) 40 MG tablet TAKE 1 TABLET BY MOUTH ONCE DAILY FOR GERD 8/23/22   Lawrence Specking, APRN - CNP   isosorbide mononitrate (IMDUR) 30 MG extended release tablet TAKE 1 TABLET BY MOUTH ONCE DAILY FOR HYPERTENSION 8/23/22   Lawrence Specking, APRN - CNP   metoprolol tartrate (LOPRESSOR) 25 MG tablet TAKE ONE HALF (1/2) ATBLETS = 12.5MG BY MOUTH TWICE A DAY FOR HYPERTENSION 8/23/22   Lawrence Specking, APRN - CNP   carbidopa-levodopa (SINEMET)  MG per tablet TAKE ONE (1) TABLET BY MOUTH FOUR (4) TIMES DAILY 8/9/22   Lawrence Specking, APRN - CNP   esomeprazole (NEXIUM) 20 MG delayed release capsule TAKE ONE (1) CAPSULE BY MOUTH ONCE DAILY IN THE MORNING BEFORE BREAKFAST 8/8/22   Lawrence Specking, APRN - CNP   rOPINIRole (REQUIP) 0.25 MG tablet TAKE ONE (1) TABLET BY MOUTH THREE (3) TIMES DAILY 8/8/22   Lawrence Specking, APRN - CNP   atorvastatin (LIPITOR) 40 MG tablet take 1 tablet by mouth once daily 8/4/22   Lawrence Specking, APRN - CNP   entacapone (COMTAN) 200 MG tablet TAKE ONE (1) TABLET BY MOUTH FOUR (4) TIMES DAILY WITH SINEMET 6/21/22   Cherry Jane MD   nitroGLYCERIN (NITROSTAT) 0.4 MG SL tablet up to max of 3 total doses.  If no relief after 1 dose, call 911. 6/9/22   JW Kelly NP   magnesium oxide (MAG-OX) 400 (240 Mg) MG tablet TAKE 1 TABLET BY MOUTH ONCE DAILY 5/23/22   Lawrence Specking, APRN - CNP   diclofenac sodium (VOLTAREN) 1 % GEL Apply 2 g topically 2 times daily as needed for Pain (joint pain) 5/9/22   Michelle Francois, APRN - NP   amitriptyline (ELAVIL) 10 MG tablet Take 1 tablet by mouth nightly 4/5/22   Keegan Gunter MD   clopidogrel (PLAVIX) 75 MG tablet TAKE 1 TABLET BY MOUTH ONCE DAILY  11/5/21   JW Montalvo CNP   allopurinol (ZYLOPRIM) 100 MG tablet TAKE 1 TABLET BY MOUTH ONCE DAILY  11/5/21   JW Montalvo CNP   acetaminophen (TYLENOL 8 HOUR ARTHRITIS PAIN) 650 MG extended release tablet Take 1 tablet by mouth every 8 hours as needed for Pain 6/14/21   JW Hein CNP   butalbital-acetaminophen-caffeine (FIORICET, ESGIC) -40 MG per tablet Take 1 tab prn only for severe headache. Can repeat after 4 hrs if needed. Max 2 tabs/24 hrs. Max 4 tabs/week 6/14/21   JW Hein CNP       REVIEW OF SYSTEMS    (2-9 systems for level 4, 10 or more for level 5)      Review of Systems   Constitutional:  Negative for chills and fever. HENT:  Negative for congestion. Respiratory:  Negative for chest tightness and shortness of breath. Cardiovascular:  Negative for chest pain and leg swelling. Gastrointestinal:  Positive for abdominal pain, nausea and vomiting. Negative for constipation and diarrhea. Endocrine: Negative for polyuria. Genitourinary:  Negative for difficulty urinating. Skin:  Negative for color change. Neurological:  Negative for dizziness, weakness, light-headedness and headaches. Psychiatric/Behavioral:  Negative for confusion. PHYSICAL EXAM   (up to 7 for level 4, 8 or more for level 5)      INITIAL VITALS:   /78   Pulse 66   Temp 98.3 °F (36.8 °C) (Oral)   Resp 23   Ht 5' 7\" (1.702 m)   Wt 180 lb (81.6 kg)   SpO2 93%   BMI 28.19 kg/m²     Physical Exam  Constitutional:       Appearance: Normal appearance. HENT:      Head: Normocephalic and atraumatic. Mouth/Throat:      Mouth: Mucous membranes are moist.      Pharynx: Oropharynx is clear. Eyes:      Extraocular Movements: Extraocular movements intact.       Conjunctiva/sclera: Conjunctivae normal.   Cardiovascular:      Rate and Rhythm: Normal rate and regular rhythm. Pulses: Normal pulses. Heart sounds: Normal heart sounds. No murmur heard. Pulmonary:      Effort: Pulmonary effort is normal.      Breath sounds: Normal breath sounds. Abdominal:      General: Bowel sounds are normal. There is no distension. Tenderness: There is abdominal tenderness (Right upper quadrant and suprapubic). There is no guarding. Musculoskeletal:         General: Normal range of motion. Comments: Range of motion noted to be normal with patient's natural movements   Skin:     General: Skin is warm and dry. Findings: No rash (On exposed skin). Neurological:      General: No focal deficit present. Mental Status: He is alert and oriented to person, place, and time.       Comments: Rolling pill tremor, patient neurologic intact, motor in all 4 extremities, equal strength, no facial droop, equal ocular motion   Psychiatric:         Mood and Affect: Mood normal.         Behavior: Behavior normal.       DIFFERENTIAL  DIAGNOSIS     PLAN (LABS / IMAGING / EKG):  Orders Placed This Encounter   Procedures    Culture, Urine    RAPID INFLUENZA A/B ANTIGENS    COVID-19, Rapid    Blood Culture 1    Culture, Blood 2    XR CHEST (2 VW)    CT ABDOMEN PELVIS WO CONTRAST Additional Contrast? None    CT CHEST WO CONTRAST    US GALLBLADDER RUQ    CMP    CBC with Auto Differential    Lipase    Troponin    Urinalysis with Microscopic    Troponin    Lactic Acid    APTT    Protime-INR    Protime-INR    CBC with Auto Differential    Comprehensive Metabolic Panel w/ Reflex to MG    Lactic Acid    C-Reactive Protein    Diet NPO Exceptions are: Sips of Water with Meds    Vital signs per unit routine    Notify physician    Up with assistance    Daily weights    Intake and output    Telemetry monitoring - 72 hour duration    Place intermittent pneumatic compression device    Bladder scan    Insert indwelling urinary catheter    Discontinue indwelling urinary catheter when documented indications no longer apply per hospital policy    Intake and output    HYPOGLYCEMIA TREATMENT: blood glucose LESS THAN 70 mg/dL and patient ALERT and TOLERATING PO    HYPOGLYCEMIA TREATMENT: blood glucose LESS THAN 70 mg/dL and patient NOT ALERT or NPO    Neuro checks    Inpatient consult to General Surgery    Inpatient consult to Hospitalist    Inpatient consult to Infectious Diseases    OT eval and treat    PT evaluation and treat    Initiate Oxygen Therapy Protocol    Pulse Oximetry Spot Check    POCT glucose    POC Glucose Fingerstick    POCT Glucose    EKG 12 Lead    EKG 12 Lead    TYPE AND SCREEN    ADMIT TO INPATIENT    Fall precautions       MEDICATIONS ORDERED:  Orders Placed This Encounter   Medications    0.9 % sodium chloride bolus    piperacillin-tazobactam (ZOSYN) 3,375 mg in dextrose 5 % 50 mL IVPB (mini-bag)     Order Specific Question:   Antimicrobial Indications     Answer:   Intra-Abdominal Infection    sodium chloride flush 0.9 % injection 5-40 mL    sodium chloride flush 0.9 % injection 10 mL    0.9 % sodium chloride infusion    OR Linked Order Group     ondansetron (ZOFRAN-ODT) disintegrating tablet 4 mg     ondansetron (ZOFRAN) injection 4 mg    OR Linked Order Group     acetaminophen (TYLENOL) tablet 650 mg     acetaminophen (TYLENOL) suppository 650 mg    DISCONTD: pantoprazole (PROTONIX) 40 mg in sodium chloride 0.9 % 50 mL bolus    0.9 % sodium chloride infusion    morphine injection 2 mg    pantoprazole (PROTONIX) 40 mg in sodium chloride (PF) 10 mL injection    glucose chewable tablet 16 g    OR Linked Order Group     dextrose bolus 10% 125 mL     dextrose bolus 10% 250 mL    glucagon (rDNA) injection 1 mg    dextrose 10 % infusion    metoprolol tartrate (LOPRESSOR) tablet 12.5 mg    metoprolol (LOPRESSOR) injection 5 mg       Differential diagnosis: ACS, low concern for stroke causing symptoms, infectious cause, medication overdose    DIAGNOSTIC RESULTS / EMERGENCY DEPARTMENT COURSE / MDM   LAB RESULTS:  Results for orders placed or performed during the hospital encounter of 10/25/22   RAPID INFLUENZA A/B ANTIGENS    Specimen: Nasopharyngeal   Result Value Ref Range    Flu A Antigen NEGATIVE NEGATIVE    Flu B Antigen NEGATIVE NEGATIVE   COVID-19, Rapid    Specimen: Nasopharyngeal Swab   Result Value Ref Range    Specimen Description . NASOPHARYNGEAL SWAB     SARS-CoV-2, Rapid Not Detected Not Detected   Blood Culture 1    Specimen: Blood   Result Value Ref Range    Specimen Description . BLOOD     Special Requests LEFT ARM 2ML     Culture NO GROWTH <24 HRS    Culture, Blood 2    Specimen: Blood   Result Value Ref Range    Specimen Description . BLOOD     Special Requests LEFT HAND 2.5ML     Culture NO GROWTH <24 HRS    CMP   Result Value Ref Range    Glucose 148 (H) 70 - 99 mg/dL    BUN 22 8 - 23 mg/dL    Creatinine 1.71 (H) 0.70 - 1.20 mg/dL    Est, Glom Filt Rate 39 (L) >60 mL/min/1.73m2    Calcium 9.4 8.6 - 10.4 mg/dL    Sodium 136 135 - 144 mmol/L    Potassium 4.2 3.7 - 5.3 mmol/L    Chloride 97 (L) 98 - 107 mmol/L    CO2 24 20 - 31 mmol/L    Anion Gap 15 9 - 17 mmol/L    Alkaline Phosphatase 230 (H) 40 - 129 U/L     (H) 5 - 41 U/L     (H) <40 U/L    Total Bilirubin 3.1 (H) 0.3 - 1.2 mg/dL    Total Protein 8.0 6.4 - 8.3 g/dL    Albumin 4.5 3.5 - 5.2 g/dL    Albumin/Globulin Ratio 1.3 1.0 - 2.5   CBC with Auto Differential   Result Value Ref Range    WBC 13.4 (H) 3.5 - 11.3 k/uL    RBC 4.74 4.21 - 5.77 m/uL    Hemoglobin 15.1 13.0 - 17.0 g/dL    Hematocrit 45.0 40.7 - 50.3 %    MCV 94.9 82.6 - 102.9 fL    MCH 31.9 25.2 - 33.5 pg    MCHC 33.6 28.4 - 34.8 g/dL    RDW 14.0 11.8 - 14.4 %    Platelets 786 730 - 366 k/uL    MPV 9.1 8.1 - 13.5 fL    NRBC Automated 0.0 0.0 per 100 WBC    Immature Granulocytes 1 (H) 0 %    Seg Neutrophils 91 (H) 36 - 66 %    Lymphocytes 3 (L) 24 - 44 %    Monocytes 4 1 - 7 %    Eosinophils % 0 (L) 1 - 4 %    Basophils 1 0 - 2 % Absolute Immature Granulocyte 0.13 0.00 - 0.30 k/uL    Segs Absolute 12.20 (H) 1.8 - 7.7 k/uL    Absolute Lymph # 0.40 (L) 1.0 - 4.8 k/uL    Absolute Mono # 0.54 0.1 - 0.8 k/uL    Absolute Eos # 0.00 0.0 - 0.4 k/uL    Basophils Absolute 0.13 0.0 - 0.2 k/uL    Morphology INCREASED BANDS PRESENT    Lipase   Result Value Ref Range    Lipase 845 (H) 13 - 60 U/L   Troponin   Result Value Ref Range    Troponin, High Sensitivity 61 (HH) 0 - 22 ng/L   Urinalysis with Microscopic   Result Value Ref Range    Color, UA Dark Yellow (A) Yellow    Turbidity UA Clear Clear    Glucose, Ur NEGATIVE NEGATIVE    Bilirubin Urine NEGATIVE NEGATIVE    Ketones, Urine NEGATIVE NEGATIVE    Specific Gravity, UA 1.013 1.005 - 1.030    Urine Hgb NEGATIVE NEGATIVE    pH, UA 8.0 5.0 - 8.0    Protein, UA NEGATIVE  Verified by sulfosalicylic acid test. (A) NEGATIVE    Urobilinogen, Urine Normal Normal    Nitrite, Urine NEGATIVE NEGATIVE    Leukocyte Esterase, Urine NEGATIVE NEGATIVE    WBC, UA 2 TO 5 0 - 5 /HPF    RBC, UA 0 TO 2 0 - 4 /HPF    Casts UA  0 - 8 /LPF     2 TO 5 HYALINE Reference range defined for non-centrifuged specimen.     Epithelial Cells UA 2 TO 5 0 - 5 /HPF   Troponin   Result Value Ref Range    Troponin, High Sensitivity 46 (H) 0 - 22 ng/L   Lactic Acid   Result Value Ref Range    Lactic Acid, Whole Blood 3.1 (H) 0.7 - 2.1 mmol/L   APTT   Result Value Ref Range    PTT 23.9 20.5 - 30.5 sec   Protime-INR   Result Value Ref Range    Protime 10.7 9.1 - 12.3 sec    INR 1.0    Protime-INR   Result Value Ref Range    Protime 10.8 9.1 - 12.3 sec    INR 1.0    C-Reactive Protein   Result Value Ref Range    CRP 45.4 (H) 0.0 - 5.0 mg/L   POCT glucose   Result Value Ref Range    Glucose 167 mg/dL   POC Glucose Fingerstick   Result Value Ref Range    POC Glucose 167 (H) 75 - 110 mg/dL   TYPE AND SCREEN   Result Value Ref Range    Expiration Date 10/29/2022,2359     Arm Band Number BE [id number]     ABO/Rh O POSITIVE     Antibody Screen NEGATIVE        RADIOLOGY:  CT CHEST WO CONTRAST   Final Result   1. The spiculated 11 mm nodule described on the CT abdomen study demonstrates   a more conspicuous central calcification, with multiple similar appearing   areas of peribronchial nodules with calcifications seen in the right lower   lobe, felt likely related to sequela of prior inflammatory/infectious   process, and very unlikely to represent a neoplastic process. No significant   change from June of 2022. Given the degree of clinical concern of these   findings, and follow-up recommendations as below, suggest repeat imaging in   approximately 12 months. 2. Minimal dependent atelectasis. 3. Other incidental findings as above. RECOMMENDATIONS:   Fleischner Society guidelines for follow-up and management of incidentally   detected pulmonary nodules:      Multiple Solid Nodules:      Nodule size greater than 8 mm   In a low-risk patient, CT at 3-6 months, then consider CT at 18-24 months. In a high-risk patient, CT at 3-6 months, then CT at 18-24 months. - Low risk patients include individuals with minimal or absent history of   smoking and other known risk factors. - High risk patients include individuals with a history or smoking or known   risk factors. Radiology 2017 http://pubs. rsna.org/doi/full/10.1148/radiol. 8960433006         CT ABDOMEN PELVIS WO CONTRAST Additional Contrast? None   Final Result   Cholelithiasis and acute cholecystitis. Gallbladder ultrasound would be   helpful for further characterization. 10 mm spiculated nodule right lung base. RECOMMENDATIONS:   Guidelines for follow-up and management of pulmonary nodules found on abdomen   CT:      >8mm - immediate chest CT for further evaluation. Radiology 2017 http://pubs. rsna.org/doi/full/10.1148/radiol. 6018037010         XR CHEST (2 VW)   Final Result   No acute process.          US GALLBLADDER RUQ    (Results Pending)          EKG  EKG Interpretation    Interpreted by emergency department physician    Rhythm: normal sinus   Rate: normal  Axis: normal  Ectopy: none  Conduction: right bundle branch block (incomplete)  ST Segments: normal  T Waves: non specific changes  Q Waves: none    Clinical Impression: non-specific EKG    Andrzej Moore DO     All EKG's are interpreted by the Emergency Department Physician who either signs or Co-signs this chart in the absence of a cardiologist.    EMERGENCY DEPARTMENT COURSE:  ED Course as of 10/26/22 0615   Wed Oct 26, 2022   0214 Surgery evaluated patient, plan for MRCP, GI evaluation. Hold Plavix. Patient to be admitted for potential cholecystectomy. [CR]      ED Course User Index  [CR] Alonzo Donnelly DO          PROCEDURES:  None    CONSULTS:  IP CONSULT TO GENERAL SURGERY  IP CONSULT TO HOSPITALIST  IP CONSULT TO INFECTIOUS DISEASES    MEDICAL DECISION MAKING:  Patient presenting with acute weakness, concern for neurologic, low as patient neurologically intact, NIH is 0. Patient denies any chest pain but concern for cardiac nature is elevated, EKG nonspecific, troponin negative, chest x-ray negative. Infectious cause is concerning, urine obtained which is noted to be negative for acute cystitis, chest x-ray demonstrates no concerns for pneumonia. Patient did demonstrate some concerns with nausea vomiting and some abdominal pain. Labs demonstrated increasing bili, AST ALT and alk phos elevated, and elevated lipase. Concern for gallbladder pancreatitis. CT imaging was obtained demonstrated thickened gallbladder wall, cholelithiasis. Spiculated mass was also noted in the pulmonary field, CT chest obtained and noted to be more concerns of infectious cause than mass. With patient having gallbladder infection causing leukocytosis and elevated lactate. Concern for sepsis, see sepsis checklist below. Patient was started on Zosyn.   Patient was evaluated by general surgery, they recommend MRCP and GI evaluation and potential cholecystectomy in the next couple days. With patient's comorbidities and for further medical management patient was admitted to hospitalist team for further treatment and management. Sepsis Times and Checklist  Vital Signs: BP: 112/78  Heart Rate: 66  Resp: 23  Temp: 98.3 °F (36.8 °C) SpO2: 93 %  SIRS (>2) Non Pregnant       Temp > 38.3C or < 36C   HR > 90   RR > 20   WBC > 12 or < 4 or >10% bands  SIRS (>2) Pregnant 20 weeks until 3 days postpartum   Temp > 38C or <36C   HR >110   RR > 24   WBC >15 or < 4 or >10% bands   SIRS (>2) and confirmed or suspected source of infection = Sepsis  Is Sepsis due to likely bacterial infection?: Yes  If not, then sepsis is due to likely     Sepsis Identified:  Date: 10/26/2022 Time: 0006  Sepsis Orders:   CBC(required): Yes   CMP: Yes   PT/PTT: Yes   Blood Cultures x2(required): Yes   Urinalysis and Urine Culture: Yes   Lactate(required): Yes   Antibiotics Given (within 3 hours of sepsis identification, after blood cultures):  Yes    (If unable to obtain IV access for IV antibiotics within 3 hours of identification of sepsis, IM antibiotics is acceptable.)              If lactate >2.0 MUST repeat within 6 hours, lactate ordered at 0 530 on 10/26/2020    If elevated, is elevated lactate from a likely infectious source?: Yes. IV Fluid Bolus:  Is lactate > 4.0:  No  ICRITICAL CARE:  Please see attending note    FINAL IMPRESSION      1. Gallstone pancreatitis          DISPOSITION / PLAN     DISPOSITION Admitted 10/26/2022 01:32:13 AM      PATIENT REFERRED TO:  No follow-up provider specified.     DISCHARGE MEDICATIONS:  New Prescriptions    No medications on file       Andrzej Reinoso,   Emergency Medicine Resident    (Please note that portions of thisnote were completed with a voice recognition program.  Efforts were made to edit the dictations but occasionally words are mis-transcribed.)       Nishant Waters [address], DO  Resident  10/26/22 [id number]

## 2022-10-27 PROBLEM — I25.2 HISTORY OF NON-ST ELEVATION MYOCARDIAL INFARCTION (NSTEMI): Status: ACTIVE | Noted: 2022-10-27

## 2022-10-27 PROBLEM — N17.9 AKI (ACUTE KIDNEY INJURY) (HCC): Status: ACTIVE | Noted: 2022-10-27

## 2022-10-27 PROBLEM — N18.32 STAGE 3B CHRONIC KIDNEY DISEASE (CKD) (HCC): Status: ACTIVE | Noted: 2022-04-20

## 2022-10-27 LAB
ABSOLUTE EOS #: 0.25 K/UL (ref 0–0.44)
ABSOLUTE IMMATURE GRANULOCYTE: 0.05 K/UL (ref 0–0.3)
ABSOLUTE LYMPH #: 0.97 K/UL (ref 1.1–3.7)
ABSOLUTE MONO #: 0.77 K/UL (ref 0.1–1.2)
ALBUMIN SERPL-MCNC: 3.4 G/DL (ref 3.5–5.2)
ALBUMIN/GLOBULIN RATIO: 1.2 (ref 1–2.5)
ALP BLD-CCNC: 143 U/L (ref 40–129)
ALT SERPL-CCNC: 70 U/L (ref 5–41)
ANION GAP SERPL CALCULATED.3IONS-SCNC: 10 MMOL/L (ref 9–17)
AST SERPL-CCNC: 83 U/L
BASOPHILS # BLD: 0 % (ref 0–2)
BASOPHILS ABSOLUTE: <0.03 K/UL (ref 0–0.2)
BILIRUB SERPL-MCNC: 2.1 MG/DL (ref 0.3–1.2)
BUN BLDV-MCNC: 21 MG/DL (ref 8–23)
CALCIUM SERPL-MCNC: 8.8 MG/DL (ref 8.6–10.4)
CHLORIDE BLD-SCNC: 104 MMOL/L (ref 98–107)
CO2: 21 MMOL/L (ref 20–31)
CREAT SERPL-MCNC: 1.42 MG/DL (ref 0.7–1.2)
EKG ATRIAL RATE: 77 BPM
EKG P AXIS: 81 DEGREES
EKG P-R INTERVAL: 192 MS
EKG Q-T INTERVAL: 400 MS
EKG QRS DURATION: 106 MS
EKG QTC CALCULATION (BAZETT): 452 MS
EKG R AXIS: 26 DEGREES
EKG T AXIS: 38 DEGREES
EKG VENTRICULAR RATE: 77 BPM
EOSINOPHILS RELATIVE PERCENT: 3 % (ref 1–4)
GFR SERPL CREATININE-BSD FRML MDRD: 49 ML/MIN/1.73M2
GLUCOSE BLD-MCNC: 103 MG/DL (ref 70–99)
GLUCOSE BLD-MCNC: 103 MG/DL (ref 75–110)
HCT VFR BLD CALC: 38.4 % (ref 40.7–50.3)
HEMOGLOBIN: 13 G/DL (ref 13–17)
IMMATURE GRANULOCYTES: 1 %
LIPASE: 16 U/L (ref 13–60)
LYMPHOCYTES # BLD: 11 % (ref 24–43)
MCH RBC QN AUTO: 32.3 PG (ref 25.2–33.5)
MCHC RBC AUTO-ENTMCNC: 33.9 G/DL (ref 28.4–34.8)
MCV RBC AUTO: 95.5 FL (ref 82.6–102.9)
MONOCYTES # BLD: 9 % (ref 3–12)
NRBC AUTOMATED: 0 PER 100 WBC
PDW BLD-RTO: 13.9 % (ref 11.8–14.4)
PLATELET # BLD: 145 K/UL (ref 138–453)
PMV BLD AUTO: 9.5 FL (ref 8.1–13.5)
POTASSIUM SERPL-SCNC: 4.4 MMOL/L (ref 3.7–5.3)
RBC # BLD: 4.02 M/UL (ref 4.21–5.77)
SEG NEUTROPHILS: 77 % (ref 36–65)
SEGMENTED NEUTROPHILS ABSOLUTE COUNT: 6.76 K/UL (ref 1.5–8.1)
SODIUM BLD-SCNC: 135 MMOL/L (ref 135–144)
TOTAL PROTEIN: 6.2 G/DL (ref 6.4–8.3)
WBC # BLD: 8.8 K/UL (ref 3.5–11.3)

## 2022-10-27 PROCEDURE — 36415 COLL VENOUS BLD VENIPUNCTURE: CPT

## 2022-10-27 PROCEDURE — 93010 ELECTROCARDIOGRAM REPORT: CPT | Performed by: INTERNAL MEDICINE

## 2022-10-27 PROCEDURE — 97530 THERAPEUTIC ACTIVITIES: CPT

## 2022-10-27 PROCEDURE — 97116 GAIT TRAINING THERAPY: CPT

## 2022-10-27 PROCEDURE — 6370000000 HC RX 637 (ALT 250 FOR IP): Performed by: FAMILY MEDICINE

## 2022-10-27 PROCEDURE — 6360000002 HC RX W HCPCS: Performed by: STUDENT IN AN ORGANIZED HEALTH CARE EDUCATION/TRAINING PROGRAM

## 2022-10-27 PROCEDURE — 6360000002 HC RX W HCPCS: Performed by: FAMILY MEDICINE

## 2022-10-27 PROCEDURE — 1200000000 HC SEMI PRIVATE

## 2022-10-27 PROCEDURE — 2580000003 HC RX 258: Performed by: FAMILY MEDICINE

## 2022-10-27 PROCEDURE — 82947 ASSAY GLUCOSE BLOOD QUANT: CPT

## 2022-10-27 PROCEDURE — 83690 ASSAY OF LIPASE: CPT

## 2022-10-27 PROCEDURE — 80053 COMPREHEN METABOLIC PANEL: CPT

## 2022-10-27 PROCEDURE — 97535 SELF CARE MNGMENT TRAINING: CPT

## 2022-10-27 PROCEDURE — 94761 N-INVAS EAR/PLS OXIMETRY MLT: CPT

## 2022-10-27 PROCEDURE — 2580000003 HC RX 258: Performed by: STUDENT IN AN ORGANIZED HEALTH CARE EDUCATION/TRAINING PROGRAM

## 2022-10-27 PROCEDURE — C9113 INJ PANTOPRAZOLE SODIUM, VIA: HCPCS | Performed by: FAMILY MEDICINE

## 2022-10-27 PROCEDURE — 6370000000 HC RX 637 (ALT 250 FOR IP): Performed by: INTERNAL MEDICINE

## 2022-10-27 PROCEDURE — 99232 SBSQ HOSP IP/OBS MODERATE 35: CPT | Performed by: INTERNAL MEDICINE

## 2022-10-27 PROCEDURE — 97162 PT EVAL MOD COMPLEX 30 MIN: CPT

## 2022-10-27 PROCEDURE — 85025 COMPLETE CBC W/AUTO DIFF WBC: CPT

## 2022-10-27 PROCEDURE — 97166 OT EVAL MOD COMPLEX 45 MIN: CPT

## 2022-10-27 PROCEDURE — 2580000003 HC RX 258: Performed by: NURSE PRACTITIONER

## 2022-10-27 RX ADMIN — MAGNESIUM GLUCONATE 500 MG ORAL TABLET 400 MG: 500 TABLET ORAL at 08:49

## 2022-10-27 RX ADMIN — METOPROLOL TARTRATE 12.5 MG: 25 TABLET ORAL at 09:40

## 2022-10-27 RX ADMIN — CARBIDOPA AND LEVODOPA 1 TABLET: 25; 100 TABLET ORAL at 19:57

## 2022-10-27 RX ADMIN — AMITRIPTYLINE HYDROCHLORIDE 10 MG: 10 TABLET, FILM COATED ORAL at 19:57

## 2022-10-27 RX ADMIN — SODIUM CHLORIDE, PRESERVATIVE FREE 10 ML: 5 INJECTION INTRAVENOUS at 19:58

## 2022-10-27 RX ADMIN — GABAPENTIN 100 MG: 100 CAPSULE ORAL at 08:49

## 2022-10-27 RX ADMIN — PIPERACILLIN AND TAZOBACTAM 3375 MG: 3; .375 INJECTION, POWDER, FOR SOLUTION INTRAVENOUS at 19:57

## 2022-10-27 RX ADMIN — GABAPENTIN 100 MG: 100 CAPSULE ORAL at 19:58

## 2022-10-27 RX ADMIN — PIPERACILLIN AND TAZOBACTAM 3375 MG: 3; .375 INJECTION, POWDER, FOR SOLUTION INTRAVENOUS at 11:36

## 2022-10-27 RX ADMIN — ENTACAPONE 200 MG: 200 TABLET, FILM COATED ORAL at 08:49

## 2022-10-27 RX ADMIN — SODIUM CHLORIDE, PRESERVATIVE FREE 40 MG: 5 INJECTION INTRAVENOUS at 08:49

## 2022-10-27 RX ADMIN — GABAPENTIN 100 MG: 100 CAPSULE ORAL at 14:21

## 2022-10-27 RX ADMIN — CARBIDOPA AND LEVODOPA 1 TABLET: 25; 100 TABLET ORAL at 08:49

## 2022-10-27 RX ADMIN — ENTACAPONE 200 MG: 200 TABLET, FILM COATED ORAL at 19:57

## 2022-10-27 RX ADMIN — PIPERACILLIN AND TAZOBACTAM 3375 MG: 3; .375 INJECTION, POWDER, FOR SOLUTION INTRAVENOUS at 03:46

## 2022-10-27 RX ADMIN — ALLOPURINOL 100 MG: 100 TABLET ORAL at 08:49

## 2022-10-27 RX ADMIN — ROPINIROLE HYDROCHLORIDE 0.25 MG: 0.25 TABLET, FILM COATED ORAL at 19:57

## 2022-10-27 ASSESSMENT — PAIN SCALES - GENERAL: PAINLEVEL_OUTOF10: 0

## 2022-10-27 ASSESSMENT — ENCOUNTER SYMPTOMS
SHORTNESS OF BREATH: 0
COUGH: 0
VOMITING: 1
NAUSEA: 1
ABDOMINAL PAIN: 1

## 2022-10-27 NOTE — PROGRESS NOTES
Physical Therapy  Facility/Department: [facility] ONC/MED SURG  Physical Therapy Initial Assessment    Name: Sari Helm  : 1940  MRN: [id number]  Date of Service: 10/27/2022  Chief Complaint   Patient presents with    Fatigue     Generalized weakness, intermittent, hx of parkinsons     History of Parkinsons    Discharge Recommendations:  Patient would benefit from continued therapy after discharge   PT Equipment Recommendations  Equipment Needed: No  Other: Pt owns cane and walker, states walker does not fit in home      Patient Diagnosis(es): The encounter diagnosis was Gallstone pancreatitis. 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 Past Surgical History:  has a past surgical history that includes Colonoscopy (2012); shoulder surgery (Right); pr colsc flx w/rmvl of tumor polyp lesion snare tq (N/A, 2017); and Colonoscopy (2017). Assessment   Body Structures, Functions, Activity Limitations Requiring Skilled Therapeutic Intervention: Decreased functional mobility ; Decreased body mechanics; Decreased tolerance to work activity; Decreased strength;Decreased cognition;Decreased balance  Assessment: Pt presetns with general weakness and deconditioning, baseline Parkinsons, assist needed with bed mobilty and transfers Pt will continue to benefit from PT to progress activity and promote improved functional indepenence.   Therapy Prognosis: Good  Decision Making: Medium Complexity  Clinical Presentation: progressing  Requires PT Follow-Up: Yes  Activity Tolerance  Activity Tolerance: Patient tolerated evaluation without incident     Plan   Physcial Therapy Plan  General Plan:  (5-6x /wk)  Current Treatment Recommendations: Strengthening, Balance training, Functional mobility training, Transfer training, Gait training, Stair training, Endurance training, Neuromuscular re-education, Therapeutic activities  Safety Devices  Type of Devices: Bed alarm in place, Nurse notified (Pt left with OT with nursing notified)     Restrictions  Restrictions/Precautions  Restrictions/Precautions: Fall Risk, General Precautions, Contact Precautions, Up as Tolerated  Required Braces or Orthoses?: No  Position Activity Restriction  Other position/activity restrictions: up with assist, fall riak     Subjective   Pain: general pain, back and le's chronic in nature  General  Chart Reviewed: Yes  Patient assessed for rehabilitation services?: Yes  Additional Pertinent Hx: Jessica Meyer is a 79y.o.-year-old male who was initially admitted on 10/25/2022. Patient seen at the request of Dr. Loco Dubois. INITIAL HISTORY:     This patient presented to the ED on 10/25/22 with complaints of generalized weakness as well as abdominal pain and 3-4 episodes of emesis over the past couple days.  Baseline Parkinsons  Response To Previous Treatment: Not applicable  Family / Caregiver Present: No  Follows Commands: Within Functional Limits  Other (Comment): pt awake and alert with neighbor at bed side upon arrival  General Comment  Comments: Pt im agreement with PT intervention  Subjective  Subjective: pt report no pain, general weakness and decreased ability to         Social/Functional History  Social/Functional History  Lives With: Alone  Type of Home: House  Home Layout: Two level, Able to Live on Main level with bedroom/bathroom, Laundry in basement (completed abouut 1x/wk)  Home Access: Stairs to enter with rails  Entrance Stairs - Number of Steps: 5  Bathroom Shower/Tub: Walk-in shower, Shower chair with back  Bathroom Toilet: Standard  Home Equipment: Merino Sames, rolling  Has the patient had two or more falls in the past year or any fall with injury in the past year?: Yes  ADL Assistance: Independent  Homemaking Assistance: Independent  Ambulation Assistance: Independent (Pt ambulate with cane)  Transfer Assistance: Independent  Active : Yes  Mode of Transportation: Car  Occupation: Retired  Type of Occupation:  by trade  Leisure & Hobbies: take care of dogs , spend time with neighbor, watching TV [place]  Additional Comments: Pt report independent at baseline with noted decline in [place], more difficult to manage  Vision/Hearing  Vision  Vision: Impaired  Tracking: Able to track stimulus in all quads w/o difficulty  Hearing  Hearing: Within functional limits    Cognition   Orientation  Overall Orientation Status: Within Normal Limits  Orientation Level: Oriented X4  Cognition  Overall Cognitive Status: Exceptions  Arousal/Alertness: Appropriate responses to stimuli  Following Commands: Follows one step commands consistently  Attention Span: Attends with cues to redirect  Safety Judgement: Decreased awareness of need for assistance  Problem Solving: Assistance required to generate solutions  Insights: Decreased awareness of deficits  Initiation: Requires cues for some  Sequencing: Requires cues for some  Cognition Comment: Pt able to make needs known     Objective   Heart Rate: 50  Heart Rate Source: Monitor  BP: (!) 101/56  BP Location: Right upper arm  BP Method: Automatic  Patient Position: Semi fowlers  MAP (Calculated): 71  Resp: 16  SpO2: 97 %  O2 Device: None (Room air)     Observation/Palpation  Posture: Fair  Observation: Poor sitting tolerance  Gross Assessment  AROM: Within functional limits  PROM: Within functional limits  Strength:  Within functional limits  Coordination: Within functional limits  Tone: Normal  Sensation: Intact     AROM RLE (degrees)  RLE AROM: WFL  AROM LLE (degrees)  LLE AROM : WFL  AROM RUE (degrees)  RUE AROM : WFL  AROM LUE (degrees)  LUE AROM : WFL  Strength RLE  Strength RLE: WFL  Strength LLE  Strength LLE: WFL  Strength RUE  Strength RUE: WFL  Strength LUE  Strength LUE: WFL  Strength Other  Other: Pt           Bed mobility  Bridging: Stand by assistance  Rolling to Left: Minimal assistance  Rolling to Right: Minimal assistance  Supine to Sit: Minimal assistance  Sit to Supine: Minimal assistance  Bed Mobility Comments: Bed mobiltiy assessed with HOB elevated, use of external support and require extra time and effort  Transfers  Sit to Stand: Minimal Assistance  Stand to Sit: Minimal Assistance  Bed to Chair: Minimal assistance  Comment: Pt report use of cane at baseline, now up with walker due to genereal weakness and mdeical decline  Ambulation  Surface: Level tile  Device: Rolling Walker  Quality of Gait: slow guarded gait limted to side step left and rigth with walker at edge of bed ~10 ft , due to weakness and complain of not feeling well  Gait Deviations: Slow Ewa  Distance: ~10 ft RW CGA  More Ambulation?: No     Balance  Posture: Good  Sitting - Static: Good  Sitting - Dynamic: Good  Standing - Static: Fair  Standing - Dynamic: Fair  Comments: standig balance assessed with rolling walker  Functional Reach Test  Fall Risk (Score of <10 inches is fall risk): Yes  Exercise Treatment: Pt able to sit EOB x 10 minutes with dizziness subsiding, STS x 1  with AD CGA  A/AROM Exercises: Ankle pumps x20 BLE, LAQs x10 BLE, SLR x10 BLE supine in bed .                                                       AM-PAC Score  AM-PAC Inpatient Mobility Raw Score : 16 (10/27/22 1503)  AM-PAC Inpatient T-Scale Score : 40.78 (10/27/22 1503)  Mobility Inpatient CMS 0-100% Score: 54.16 (10/27/22 1503)  Mobility Inpatient CMS G-Code Modifier : CK (10/27/22 1503)               Goals  Short Term Goals  Time Frame for Short Term Goals: 5 visits  Short Term Goal 1: Pt to ambulate 100 ft RW SBA  Short Term Goal 2: ascend/descend 6 steps with Mod I  Short Term Goal 3: pt to transfer from alternate height surfaces with mod I using appropriate AD  Short Term Goal 4: Pt to toleraate 30 minutes Mod I  Long Term Goals  Time Frame for Long Term Goals : 14 visits  Long Term Goal 1: Pt t ambulate 75 ft Mod I with cane  Patient Goals   Patient Goals : to return home alone       Education  Patient Education  Education Given To: Patient  Education Provided: Role of Therapy;Plan of Care;Transfer Training; Fall Prevention Strategies  Education Method: Demonstration; Other (Comment)  Barriers to Learning: None  Education Outcome: Verbalized understanding      Therapy Time   Individual Concurrent Group Co-treatment   Time In 1319         Time Out 1423         Minutes 64         Timed Code Treatment Minutes: 38 Minutes (Partial Co- tx with OT)       Parveen Leiva, PT, DPT

## 2022-10-27 NOTE — PROGRESS NOTES
[facility] - Progress Note    Today's Date and Time: 10/27/2022, 9:36 AM    Impression :   Acute cholecystitis  No biliary or pancreatic ductal dilation or choledocholithiasis on MRCP  Cholelithiasis  Pulmonary nodule  Transaminitis  Leukocytosis  TAMARA-resolved  Hx Parkinson's  Hx MRSA on leg wound 2015    Recommendations:   Continue IV Zosyn for cholecystitis   Closely monitor patient's progress  Follow-up on culture results    Medical Decision Making/Summary/Discussion:10/27/2022     Patient with acute cholelithiasis and cholecystitis  MRCP completed with no biliary or pancreatic ductal dilation or choledocholithiasis. Infection Control Recommendations   [place] Precautions    Antimicrobial Stewardship Recommendations   Discontinuation of therapy    Coordination of Outpatient Care:   Estimated Length of IV antimicrobials:TBD  Patient will need Midline Catheter Insertion: TBD  Patient will need PICC line Insertion:BD  Patient will need: Home IV , [place],  SNF,  LTAC: TBD  Patient will need outpatient wound care:    Chief complaint/reason for consultation:   Antibiotic recommendations for acute cholecystitis    History of Present Illness:   Jose James is a 79y.o.-year-old male who was initially admitted on 10/25/2022. Patient seen at the request of Dr. Dom Cantu. INITIAL HISTORY:    This patient presented to the ED on 10/25/22 with complaints of generalized weakness as well as abdominal pain and 3-4 episodes of emesis over the past couple days. Lab work revealed elevated AST/ALT, bilirubin, lipase and alk phos. A CT abdomen revealed acute cholecystitis with cholelithiasis. A CT chest was also completed that showed a 10 mm spiculated nodule in the right lung base. General surgery was consulted and an MRCP was ordered.    MRCP findings include cholelithiasis with acute cholecystitis  No biliary or pancreatic ductal dilation and no evidence of choledocholithiasis. The patient received one dose of Zosyn. On 10/26/22    IMAGING:  CT ABDOMEN PELVIS WO CONTRAST Additional Contrast? None     Result Date: 10/26/2022  Cholelithiasis and acute cholecystitis. Gallbladder ultrasound would be helpful for further characterization. 10 mm spiculated nodule right lung base. RECOMMENDATIONS: Guidelines for follow-up and management of pulmonary nodules found on abdomen CT: >8mm - immediate chest CT for further evaluation. Radiology 2017 http://pubs. rsna.org/doi/full/10.1148/radiol. 9282169385      XR CHEST (2 VW)     Result Date: 10/25/2022  No acute process. MRCP 10/26/22  Impression   Cholelithiasis with findings suggesting acute cholecystitis. No biliary or pancreatic ductal dilatation. No evidence of   choledocholithiasis. Mild hepatic steatosis. CURRENT EVALUATION 10/27/2022  /62   Pulse 54   Temp 97.7 °F (36.5 °C) (Oral)   Resp 16   Ht 5' 7\" (1.702 m)   Wt 185 lb 3 oz (84 kg)   SpO2 96%   BMI 29.00 kg/m²     Afebrile  VS stable on room air    Pt slept comfortably  No acute events overnight  Doing well this morning    Pt denies shortness of breath, chest pain, fever, chills, nausea, or  vomiting. Currently on NPO with small sips of water. General surgery planning on advancing to CLD today and is considering laparoscopic cholecystectomy on 10/29/22. AST, ALT, ALP, Bilirubin, and Lipase are trending downwards    Labs, X rays reviewed: 10/27/2022    BUN: 22-->21  Cr: 1.71-->1.42    WBC: 13.4-->8.8  Hb: 15.1-->13  Plat: 204-->145    ALT: 363-->70  AST: 427-->83  Alk phos: 230-->143  Bili: 3.1-->2.1  Lipase: 845-->16    CRP: 45.4    Cultures:  Urine:  10/25/22: No significant growth  Blood:  10/26/22: No growth  Sputum :    Wound:    MRSA Nares:    Imaging:  CT Chest 10/25/22      CT abd/pelvis 10/25/22      Discussed with patient, RN, family.     I have personally reviewed the past medical history, past surgical history, medications, social history, and family history, and I have updated the database accordingly.   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 ([address] 3/18/2011    d/t being hurt on the job    CKD (chronic kidney disease) stage 2, GFR 60-89 ml/min     CKD (chronic kidney disease) stage 3, GFR 30-59 ml/min ([facility])     Diverticulosis of colon     GERD (gastroesophageal reflux disease)     Impaired renal function     MRSA (methicillin resistant staph aureus) culture positive 9/23/2015    leg    Osteoarthritis of knee     Left    Parkinson disease ([address]     Proteinuria     Skull fracture ([address] 3/18/2011    d/t 12-foot drop on the job    Temporary loss of eyesight     periodically, happened x7    Tubular adenoma of colon 2012, 2017    [facility]     Past Surgical  History:     Past Surgical History:   Procedure Laterality Date    COLONOSCOPY  11/02/2012    poor prep, tubular adenoma    COLONOSCOPY  09/19/2017    diverticulosis, multiple polyps as described, spot marking done, pathology-tubular adenoma x 4    RI COLSC FLX W/RMVL OF TUMOR POLYP LESION SNARE TQ N/A 9/19/2017    COLONOSCOPY POLYPECTOMY SNARE/COLD BIOPSY with tatooing and apc transverse colon performed by William Chopra MD at [address] [place] [address] Right     rotator cuff     Medications:      sodium chloride flush  5-40 mL IntraVENous 2 times per day    metoprolol tartrate  12.5 mg Oral BID    pantoprazole (PROTONIX) 40 mg injection  40 mg IntraVENous Daily    allopurinol  100 mg Oral Daily    amitriptyline  10 mg Oral Nightly    carbidopa-levodopa  1 tablet Oral BID    entacapone  200 mg Oral BID    gabapentin  100 mg Oral TID    rOPINIRole  0.25 mg Oral Nightly    magnesium oxide  400 mg Oral Daily    piperacillin-tazobactam  3,375 mg IntraVENous Q8H     Social History:     Social History     Socioeconomic History    Marital status: Single     Spouse name: Not on file    Number of children: Not on file    Years of education: Not on file    Highest education level: Not on file   Occupational History    Not on file   Tobacco Use    Smoking status: Former    Smokeless tobacco: Never   Vaping Use    Vaping Use: Never used   Substance and Sexual Activity    Alcohol use: No    Drug use: No    Sexual activity: Not Currently   Other Topics Concern    Not on file   Social History Narrative    Not on file     Social Determinants of Health     Financial Resource Strain: Low Risk     Difficulty of Paying Living Expenses: Not hard at all   Food Insecurity: No Food Insecurity    Worried About [address] [employer] in the Last Year: Never true    [facility] [employer] in the Last Year: Never true   Transportation Needs: No Transportation Needs    Lack of Transportation (Medical): No    Lack of Transportation (Non-Medical): No   Physical Activity: Inactive    Days of Exercise per Week: 0 days    Minutes of Exercise per Session: 0 min   Stress: Stress Concern Present    Feeling of Stress : To some extent   Social Connections: Socially Isolated    Frequency of Communication with Friends and Family: Three times a week    Frequency of Social Gatherings with Friends and Family: Three times a week    Attends Denominational Services: Never    Active Member of Clubs or Organizations: No    Attends Club or Organization Meetings: Never    Marital Status:    Intimate Partner Violence: Not At Risk    Fear of Current or Ex-Partner: No    Emotionally Abused: No    Physically Abused: No    Sexually Abused: No   Housing Stability: Low Risk     Unable to Pay for Housing in the Last Year: No    Number of Places Lived in the Last Year: 1    Unstable Housing in the Last Year: No     Family History:     Family History   Problem Relation Age of Onset    No Known Problems Mother     No Known Problems Father       Allergies:   Dye [iodides] and Aspirin     Review of Systems:   Constitutional: No fevers or chills.  No systemic complaints  Head: No headaches  Eyes: No double vision or blurry vision. No conjunctival inflammation. ENT: No sore throat or runny nose. . No hearing loss, tinnitus or vertigo. Cardiovascular: No chest pain or palpitations. No shortness of breath. No METZ  Lung: No shortness of breath or cough. No sputum production  Abdomen: No nausea, vomiting, diarrhea, or abdominal pain. Lucyann Push No cramps. Genitourinary: No increased urinary frequency, or dysuria. No hematuria. No suprapubic or CVA pain  Musculoskeletal: No muscle aches or pains. No joint effusions, swelling or deformities  Hematologic: No bleeding or bruising. Neurologic: No headache, weakness, numbness, or tingling. Integument: No rash, no ulcers. Psychiatric: No depression. Endocrine: No polyuria, no polydipsia, no polyphagia. Physical Examination :   Patient Vitals for the past 8 hrs:   BP Temp Temp src Pulse Resp SpO2 Weight   10/27/22 0754 (!) 113/54 97.7 °F (36.5 °C) Oral 54 16 96 % --   10/27/22 0630 -- -- -- -- -- -- 185 lb 3 oz (84 kg)     General Appearance: Awake, alert, and in no apparent distress  Head:  Normocephalic, no trauma  Eyes: Pupils equal, round, reactive to light and accommodation; extraocular movements intact; sclera anicteric; conjunctivae pink. No embolic phenomena. ENT: Oropharynx clear, without erythema, exudate, or thrush. No tenderness of sinuses. Mouth/throat: mucosa pink and moist. No lesions. Dentition in good repair. Neck:Supple, without lymphadenopathy. Thyroid normal, No bruits. Pulmonary/Chest: Clear to auscultation, without wheezes, rales, or rhonchi. No dullness to percussion. Cardiovascular: Regular rate and rhythm without murmurs, rubs, or gallops. Abdomen: Soft, non tender. Bowel sounds normal. No organomegaly  All four Extremities: No cyanosis, clubbing, edema, or effusions. Neurologic: No gross sensory or motor deficits. Skin: Warm and dry with good turgor. No signs of peripheral arterial or venous insufficiency. No ulcerations. No open wounds. Medical Decision Making -Laboratory:   I have independently reviewed/ordered the following labs:    CBC with Differential:   Recent Labs     10/25/22  2126 10/27/22  0608   WBC 13.4* 8.8   HGB 15.1 13.0   HCT 45.0 38.4*    145   LYMPHOPCT 3* 11*   MONOPCT 4 9     BMP:   Recent Labs     10/26/22  1040 10/27/22  0608    135   K 3.8 4.4   * 104   CO2 18* 21   BUN 17 21   CREATININE 0.86 1.42*     Hepatic Function Panel:   Recent Labs     10/26/22  1837 10/27/22  0608   PROT 6.5 6.2*   LABALBU 3.4* 3.4*   BILIDIR 2.3*  --    IBILI 0.8  --    BILITOT 3.1* 2.1*   ALKPHOS 152* 143*   * 70*   * 83*     No results for input(s): RPR in the last 72 hours. No results for input(s): HIV in the last 72 hours. No results for input(s): BC in the last 72 hours. Lab Results   Component Value Date/Time    MUCUS NOT REPORTED 02/16/2022 12:09 PM    RBC 4.02 10/27/2022 06:08 AM    RBC 5.14 02/24/2012 11:00 AM    TRICHOMONAS NOT REPORTED 02/16/2022 12:09 PM    WBC 8.8 10/27/2022 06:08 AM    YEAST NOT REPORTED 02/16/2022 12:09 PM    TURBIDITY Clear 10/25/2022 10:25 PM     Lab Results   Component Value Date/Time    CREATININE 1.42 10/27/2022 06:08 AM    GLUCOSE 103 10/27/2022 06:08 AM    GLUCOSE 97 04/24/2012 12:55 PM     Medical Decision Making-Imaging:     Narrative   EXAMINATION:   CT OF THE CHEST WITHOUT CONTRAST 10/26/2022 1:29 am       TECHNIQUE:   CT of the chest was performed without the administration of intravenous   contrast. Multiplanar reformatted images are provided for review. Automated   exposure control, iterative reconstruction, and/or weight based adjustment of   the mA/kV was utilized to reduce the radiation dose to as low as reasonably   achievable.        COMPARISON:   CT abdomen and pelvis the previous day, CT chest on 06/27/2022       HISTORY:   ORDERING SYSTEM PROVIDED HISTORY: eval mass   TECHNOLOGIST PROVIDED HISTORY:   eval mass Decision Support Exception - unselect if not a suspected or confirmed   emergency medical condition->Emergency Medical Condition (MA)       FINDINGS:   Mediastinum: No cardiomegaly is identified. No focal esophageal thickening   is seen. Small hiatal hernia. No mediastinal lymphadenopathy is identified. Calcified lymph nodes are noted. No thyroid mass. No pericardial effusion. Coronary artery stent is noted. Lungs/pleura: Respiratory motion artifact, however there are numerous areas   of mild peribronchial nodularity seen within the right lower lobe, most of   which demonstrate small punctate benign-appearing calcifications, and   findings felt likely related to post inflammatory/infectious changes. The   described spiculated nodule on the comparison CT abdomen demonstrates a   calcification which is more conspicuous on this study, and felt very unlikely   to represent a neoplastic process. Upper Abdomen: Cholelithiasis with potential cholecystitis noted, partially   imaged as previously discussed. Soft Tissues/Bones: No axillary or supraclavicular lymphadenopathy is   identified. No acute osseous abnormality. No acute vertebral body fracture   is identified. No osseous destructive process is identified. Impression   1. The spiculated 11 mm nodule described on the CT abdomen study demonstrates   a more conspicuous central calcification, with multiple similar appearing   areas of peribronchial nodules with calcifications seen in the right lower   lobe, felt likely related to sequela of prior inflammatory/infectious   process, and very unlikely to represent a neoplastic process. No significant   change from June of 2022. Given the degree of clinical concern of these   findings, and follow-up recommendations as below, suggest repeat imaging in   approximately 12 months. 2. Minimal dependent atelectasis. 3. Other incidental findings as above.        RECOMMENDATIONS: Fleischner Society guidelines for follow-up and management of incidentally   detected pulmonary nodules:       Multiple Solid Nodules:       Nodule size greater than 8 mm   In a low-risk patient, CT at 3-6 months, then consider CT at 18-24 months. In a high-risk patient, CT at 3-6 months, then CT at 18-24 months. - Low risk patients include individuals with minimal or absent history of   smoking and other known risk factors. - High risk patients include individuals with a history or smoking or known   risk factors. Radiology 2017 http://pubs. rsna.org/doi/full/10.1148/radiol. [id number]         Narrative   EXAMINATION:   CT OF THE ABDOMEN AND PELVIS WITHOUT CONTRAST 10/25/2022 11:38 pm       TECHNIQUE:   CT of the abdomen and pelvis was performed without the administration of   intravenous contrast. Multiplanar reformatted images are provided for review. Automated exposure control, iterative reconstruction, and/or weight based   adjustment of the mA/kV was utilized to reduce the radiation dose to as low   as reasonably achievable. COMPARISON:   None. HISTORY:   ORDERING SYSTEM PROVIDED HISTORY: eval for gallstone pancreatitis   TECHNOLOGIST PROVIDED HISTORY:   eval for gallstone pancreatitis       Decision Support Exception - unselect if not a suspected or confirmed   emergency medical condition->Emergency Medical Condition (MA)   Reason for Exam: eval for gallstone pancreatitis       FINDINGS:   Lower Chest: Lad stent/coronary artery disease. 10 mm spiculated nodule   right lung base. Organs: Liver is hypodense. 15 mm lamellated gallstone noted. Gallbladder   wall thickening noted. Adrenals and pancreas normal.       GI/Bowel: Small hiatal hernia. Small large bowel normal.  Appendix normal.       Pelvis: Fat containing bilateral inguinal hernias. Peritoneum/Retroperitoneum: Calcified plaque along the aorta and its branches.        Bones/Soft Tissues: Spondylosis and multilevel vacuum disc phenomena. Slight   anterior subluxation L4-5. Impression   Cholelithiasis and acute cholecystitis. Gallbladder ultrasound would be   helpful for further characterization. 10 mm spiculated nodule right lung base. RECOMMENDATIONS:   Guidelines for follow-up and management of pulmonary nodules found on abdomen   CT:       >8mm - immediate chest CT for further evaluation. Radiology 2017 http://pubs. rsna.org/doi/full/10.1148/radiol. [id number]     Narrative   EXAMINATION:   MRI OF THE ABDOMEN WITHOUT CONTRAST AND MRCP 10/26/2022 9:47 am       TECHNIQUE:   Multiplanar multisequence MRI of the abdomen was performed without the   administration of intravenous contrast.  After initial T2 axial and coronal   images, thick slab, thin slab and 3D coronal MRCP sequences were obtained   without the administration of intravenous contrast.  MIP images are provided   for review. COMPARISON:   10/25/2022 CT abdomen/pelvis       HISTORY:   ORDERING SYSTEM PROVIDED HISTORY: cholelithiasis elevated lft's   ,?choledocholithiasis   TECHNOLOGIST PROVIDED HISTORY:   cholelithiasis elevated lft's ,?choledocholithiasis   What is the sedation requirement?->None       FINDINGS:   Gallbladder: There is cholelithiasis with a large stone measuring   approximately 19 mm in size. There is gallbladder wall thickening measuring   approximately 7 mm in thickness. There is also pericholecystic fluid. Findings can be seen with acute cholecystitis in the proper clinical setting. Bile Ducts: There is no evidence of common bile duct dilatation. Common bile   duct measures approximately 4 mm in diameter. No intrahepatic or   extrahepatic biliary ductal dilatation. No evidence of choledocholithiasis. No evidence of common bile duct stricture or obstruction. Pancreatic Duct: No pancreatic ductal dilatation or obstruction. No   pancreatic ductal stones.   No evidence of pancreatic divisum. Other:  Examination not tailored for the evaluation of the solid abdominal   organs. There is mild diffuse hepatic steatosis. No evidence of   hydronephrosis. No evidence of abdominal ascites or lymphadenopathy. Visualized bowel is grossly unremarkable. Osseous structures demonstrate no   acute abnormality. Impression   Cholelithiasis with findings suggesting acute cholecystitis. No biliary or pancreatic ductal dilatation. No evidence of   choledocholithiasis. Mild hepatic steatosis. Narrative   EXAMINATION:   TWO XRAY VIEWS OF THE CHEST       10/25/2022 9:44 pm       COMPARISON:   July 19, 2022       HISTORY:   ORDERING SYSTEM PROVIDED HISTORY: eval for pneumonia   TECHNOLOGIST PROVIDED HISTORY:   eval for pneumonia       FINDINGS:   The lungs are without acute focal process. There is no effusion or   pneumothorax. The cardiomediastinal silhouette is without acute process. The   osseous structures are without acute process. Impression   No acute process. Medical Decision [id number]:     Medical Decision Making-Other:   Note:  Labs, medications, radiologic studies were reviewed with personal review of films  Large amounts of data were reviewed  Discussed with nursing Staff, Discharge planner  Infection Control and Prevention measures reviewed  All prior entries were reviewed  Administer medications as ordered  Prognosis: [address]  Discharge planning reviewed    Thank you for allowing us to participate in the care of this patient. Please call with questions. [address] [facility] [place]; [place], [facility] [address] participated in the evaluation of this patient, under supervision. ATTESTATION:    I have discussed the case, including pertinent history and exam findings with the residents and students.  I have seen and examined the patient and the key elements of the encounter have been performed by me. I was present when the student obtained his information or examined the patient. I have reviewed the laboratory data, other diagnostic studies and discussed them with the residents. I have updated the medical record where necessary. I agree with the assessment, plan and orders as documented by the resident/ student.     Roman Mcduffie MD.        Pager: [phone] - Office: [phone]

## 2022-10-27 NOTE — PROGRESS NOTES
General Surgery:  Daily Progress Note                   PATIENT NAME: Gavi Hernandez     TODAY'S DATE: 10/27/2022, 5:25 AM  CC:  abdominal pain    SUBJECTIVE:     Pt seen and examined at bedside. Tmax 98. 6. Pt states he slept well last night and that he feels well at this time. He denies any abdominal pain at this time. No complaints at this time. Denies fever, chills, nausea, vomiting, chest pain, and SOB. Tolerating diet of NPO with sips of water. OBJECTIVE:   VITALS:  /63   Pulse 57   Temp 98.6 °F (37 °C) (Oral)   Resp 18   Ht 5' 7\" (1.702 m)   Wt 180 lb (81.6 kg)   SpO2 93%   BMI 28.19 kg/m²      INTAKE/OUTPUT:      Intake/Output Summary (Last 24 hours) at 10/27/2022 0533  Last data filed at 10/27/2022 0424  Gross per 24 hour   Intake --   Output 2500 ml   Net -2500 ml       PHYSICAL EXAM:  General Appearance: in no acute distress and alert  HEENT:  Normocephalic, atraumatic, mucus membranes moist   Heart: Heart sounds are normal.  Regular rate and rhythm without murmur, gallop or rub. Lungs: Normal expansion. Clear to auscultation. No rales, rhonchi, or wheezing. Abdomen: Bowel sounds present; abdomen soft and non-distended; No pain to palpation   Extremities: No cyanosis, pitting edema, rashes noted. Skin: Skin color, texture, turgor normal. No rashes or lesions. IV Access:  PIV left forearm      Data:  pending    Radiology Review:    Narrative   EXAMINATION:   MRI OF THE ABDOMEN WITHOUT CONTRAST AND MRCP 10/26/2022 9:47 am       TECHNIQUE:   Multiplanar multisequence MRI of the abdomen was performed without the   administration of intravenous contrast.  After initial T2 axial and coronal   images, thick slab, thin slab and 3D coronal MRCP sequences were obtained   without the administration of intravenous contrast.  MIP images are provided   for review.        COMPARISON:   10/25/2022 CT abdomen/pelvis       HISTORY:   ORDERING SYSTEM PROVIDED HISTORY: cholelithiasis elevated lft's   ,?choledocholithiasis   TECHNOLOGIST PROVIDED HISTORY:   cholelithiasis elevated lft's ,?choledocholithiasis   What is the sedation requirement?->None       FINDINGS:   Gallbladder: There is cholelithiasis with a large stone measuring   approximately 19 mm in size. There is gallbladder wall thickening measuring   approximately 7 mm in thickness. There is also pericholecystic fluid. Findings can be seen with acute cholecystitis in the proper clinical setting. Bile Ducts: There is no evidence of common bile duct dilatation. Common bile   duct measures approximately 4 mm in diameter. No intrahepatic or   extrahepatic biliary ductal dilatation. No evidence of choledocholithiasis. No evidence of common bile duct stricture or obstruction. Pancreatic Duct: No pancreatic ductal dilatation or obstruction. No   pancreatic ductal stones. No evidence of pancreatic divisum. Other:  Examination not tailored for the evaluation of the solid abdominal   organs. There is mild diffuse hepatic steatosis. No evidence of   hydronephrosis. No evidence of abdominal ascites or lymphadenopathy. Visualized bowel is grossly unremarkable. Osseous structures demonstrate no   acute abnormality. Impression   Cholelithiasis with findings suggesting acute cholecystitis. No biliary or pancreatic ductal dilatation. No evidence of   choledocholithiasis. Mild hepatic steatosis.        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 male with abdominal pain and vomiting for 3 days. MRCP significant for cholelithiasis, pericholecystic fluid, no choledocholithiasis. Labs significant for elevated direct bilirubin, ALT, AST, Alk phos, Lipase        Plan:  Diet: NPO with sips of water, advance to CLD today  Analgesia:  morphine and tylenol  GI prophylaxis: pantoprazole  DVT prophylaxis:  IPC; Plavix held  Activity:  Continue to encourage ambulation/activity w/ PT/OT  Continue to encourage incentive spirometry  Labs trend bilirubin and lipase  ID: [email or web address]  In light of patient's elevated lipase and Plavix history, we will trend labs and advance diet to CLD today, while continuing to monitor clinical progression. May consider lap cholecystectomy on 10/29    Electronically signed by Mila Jacobsen  on 10/27/2022 at 5:25 AM       General Surgery Resident Statement/Addendum  I have discussed the case, including pertinent history and exam findings with the above medical student. I have personally seen the patient. Note was edited and changes made by me. Assessment and plan reviewed and changes made by me. I agree with the assessment and plan as stated above.     Allie Zimmerman, DO PGY-1  10/27/22 6:58 AM

## 2022-10-27 NOTE — PROGRESS NOTES
Occupational Therapy  Facility/Department: [facility]  Occupational Therapy Initial Assessment    Name: Sonam Lockhart  : 1940  MRN: [id number]  Date of Service: 10/27/2022    Discharge Recommendations:  Patient would benefit from continued therapy after discharge  OT Equipment Recommendations  Equipment Needed: Yes  Mobility Devices: ADL Assistive Devices  ADL Assistive Devices: Grab Bars - shower       Patient Diagnosis(es): The encounter diagnosis was Gallstone pancreatitis. 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 Past Surgical History:  has a past surgical history that includes Colonoscopy (2012); shoulder surgery (Right); pr colsc flx w/rmvl of tumor polyp lesion snare tq (N/A, 2017); and Colonoscopy (2017). Chief Complaint   Patient presents with    Fatigue     Generalized weakness, intermittent, hx of parkinsons        Assessment   Performance deficits / Impairments: Decreased functional mobility ; Decreased ADL status; Decreased endurance;Decreased high-level IADLs;Decreased safe awareness;Decreased balance;Decreased cognition  Assessment: Pt demonstrated supine to sit trasnfer EOB with Min A, functional sit<>stand transfers with CGA and functional mobility with CGA. Use of RW. Limited d/t fatiguing quickly. Pt reporting \"funny feeling\", but diffulty describing to writer other than weakness. RN present for mobility.  Pt completed denture care/oral hygiene seated EOB with Modified independence. Pt grossly limited by decreased endurance and high level balance. Pt is expected to require skilled OT services during their acute hospitalization stay to address the above noted deficits through skilled occupational therapy intervention for promotion of increased independence throughout ADLs, IADLs and functional mobility tasks. Prognosis: Good  Decision Making: Medium Complexity  REQUIRES OT FOLLOW-UP: Yes  Activity Tolerance  Activity Tolerance: Patient Tolerated treatment well        Plan   Occupational Therapy Plan  Times Per Week: 3-4x/wk  Current Treatment Recommendations: Balance training, Functional mobility training, Endurance training, Safety education & training, Patient/Caregiver education & training, Equipment evaluation, education, & procurement, Return to work related activity, Self-Care / ADL, Home management training     Restrictions  Restrictions/Precautions  Restrictions/Precautions: Fall Risk, Contact Precautions, Up as Tolerated  Required Braces or Orthoses?: No  Position Activity Restriction  Other position/activity restrictions: up with assist; Hx of parkinsons    Subjective   General  Patient assessed for rehabilitation services?: Yes  Family / Caregiver Present: No  General Comment  Comments: RN  ok'd for OT/PT eval this AM. Pt agreeable to session, pleasent/cooperative throughout. Pt denies any pain.      Social/Functional History  Social/Functional History  Lives With: Alone  Type of Home: House  Home Layout: Two level, Able to Live on Main level with bedroom/bathroom, Laundry in basement (completed abouut 1x/wk)  Home Access: Stairs to enter with rails  Entrance Stairs - Number of Steps: 5  Bathroom Shower/Tub: Walk-in shower, Shower chair with back  [employer]: Standard  Home Equipment: [employer]  Has the patient had two or more falls in the past year or any fall with injury in the past year?: Yes  ADL Assistance: Independent  Homemaking Assistance: Independent  Ambulation Assistance: Independent (Pt ambulate with cane)  Transfer Assistance: Independent  Active : Yes  Mode of Transportation: Car  Occupation: Retired  Type of Occupation:  by [employer]  Leisure & Hobbies: take care of dogs , spend time with neighbor, watching TV [place]  Additional Comments: Pt report independent at baseline with noted decline in [place], more difficult to manage       Objective   Safety Devices  Type of Devices: Bed alarm in place;Nurse notified  Restraints  Restraints Initially in Place: No    Balance  Sitting: With support (SUP for EOB static sitting and SBA for EOB dynamic sitting ~15 minutes)  Standing: With support (Pt stood at EOB for ~2 minutes with CGA and use of RW. Pt c/o of \"funny feeling\". Difficulty describing. Reports \"weakness\".)    Gait  Overall Level of Assistance: Contact-guard assistance (Completed functional side steps along HOB. Pt no LOB, but reports again funny feeling. Reporting weakness adn requesting to return to seated position. Expeced pt would be able to do further mobility.)     AROM: Within functional limits  Strength: Within functional limits (BUE 4/5)  Coordination: Generally decreased, functional (BUE tremors at baseline)  Tone: Normal  Sensation: Intact (Denies any numbness/tingling)    ADL  Feeding: Independent;Setup  Grooming: Modified independent ;Setup; Increased time to complete  Grooming Skilled Clinical Factors: Pt completed oral hygiene and denture care. Required increased time d/t decreased [address]. UE Bathing: Stand by assistance;Setup; Increased time to complete  LE Bathing: Minimal assistance;Verbal cueing;Setup; Increased time to complete  UE Dressing: Stand by assistance; Increased time to complete  LE Dressing: Minimal assistance;Verbal cueing;Setup; Increased time to complete  Toileting: Minimal assistance; Increased time to complete;Setup     Activity Tolerance  Activity Tolerance: Patient tolerated evaluation without incident    Bed mobility  Supine to Sit: Minimal assistance (Assist for trunk progression)  Sit to Supine: Contact guard assistance  Scooting: Contact guard assistance  Bed Mobility Comments: Increased time/effort; HOB 30    Transfers  Sit to stand: Contact guard assistance  Stand to sit: Contact guard assistance  Transfer Comments: RW    Vision  Vision: Impaired  Tracking: Able to track stimulus in all quads w/o difficulty  Hearing  Hearing: Within functional limits    Cognition  Overall Cognitive Status: Exceptions  Arousal/Alertness: Appropriate responses to stimuli  Following Commands: Follows one step commands consistently; Follows multistep commands with increased time; Follows multistep commands with repitition  Attention Span: Attends with cues to redirect  Memory: Appears intact  Safety Judgement: Decreased awareness of need for assistance  Problem Solving: Assistance required to generate solutions  Insights: Decreased awareness of deficits  Initiation: Requires cues for some  Sequencing: Requires cues for some  Cognition Comment: Pt able to make needs known  Orientation  Overall Orientation Status: Within Normal Limits  Orientation Level: Oriented X4                  Education Provided Comments: Pt educated on OT role, OT POC, activity promotion, transfer training, EC/WS-good return  LUE AROM (degrees)  LUE AROM : WFL  Left Hand AROM (degrees)  Left Hand AROM: WFL  RUE AROM (degrees)  RUE AROM : WFL  Right Hand AROM (degrees)  Right Hand AROM: WFL          AM-PAC Score        AM-PAC Inpatient Daily Activity Raw Score: 20 (10/27/22 1711)  AM-PAC Inpatient ADL T-Scale Score : 42.03 (10/27/22 1711)  ADL Inpatient CMS 0-100% Score: 38.32 (10/27/22 1711)  ADL Inpatient CMS G-Code Modifier : CJ (10/27/22 1711)      Goals  Short Term Goals  Time Frame for Short Term Goals: By discharge, pt will:  Short Term Goal 1: Demo fucntional sit<>stand transfers and functional mobility with SBA and LRD PRN  Short Term Goal 2: Demo 10 minutes of dynamic standing balance with SBA to promote increased engagement in ADLS  Short Term Goal 3: Demo UB ADLs with Mod IND  Short Term Goal 4: Demo LB ADLs/toileting with SBA and DME PRN  Short Term Goal 5: Demo 25 minutes of functional activity tolerance to promote icnreased engagement in ADLs       Therapy Time   Individual Concurrent Group Co-treatment   Time In 1409         Time Out 1434         Minutes 25         Timed Code Treatment Minutes: 10 Minutes       Cathy Saavedra OTR/L

## 2022-10-27 NOTE — PROGRESS NOTES
[facility]  Office: [address], DO, Kanwal Alonzo, DO, Maira Nichols, DO, Tai Neelygenny Blood, DO, Yolette Whalen MD, Annie De Jesus MD, Adelia Samayoa MD, Jonah Hernandez MD,  Morgan Nevarez MD, Ligia Moore MD, Zabrina Ramirez, DO, Mane Jefferson MD,  Marni Pearson MD, Hector Traylor MD, Izabella Brady, DO, Slim Hearn MD, Ryder Nguyen MD, Yasmine Carrillo DO, Kavin Rodney MD, Charlene Dominguez MD, Felipe Corona MD, Cecille Degroot MD, Turner Durbin, , Ashlee Garcia MD, William Shaw MD, Liang Muniz, CNP,  Aurora Amador, CNP, Mireya Spear, CNP, Anival Oliver, CNP,  Gill Robin, [facility], Kaleb Romero, CNP, Nohemy Cuenca, CNP, Devonte Lopez, CNP, Jed Chan, CNP, Tamy Champion, CNP, Paulina Sandhu, PA-C, Mauro Berkowitz, CNS, Yazan Garibay, DNP, Kirsty Rutherford, CNP, Kike Lilly, CNP, Hayden Nicole, [address] [place] [address]    Progress Note    10/27/2022    3:51 PM    Name:   Harpreet Richards  MRN:     [id number]     Acct:      [de-identified]   Room:   [address] [place] [address] Day:  1  Admit Date:  10/25/2022  8:54 PM    PCP:   JW Montgomery CNP  Code Status:  Full Code    Subjective:     C/C:   Chief Complaint   Patient presents with    Fatigue     Generalized weakness, intermittent, hx of parkinsons      Interval History Status:   Improved  No nausea or vomiting  He is hungry  Pain resolving    Data Base Updates:  WBC8.8k/uL RBC4.02 Low m/uL Ilhyugrvju35.0     Alkaline Zfxkicizwpl552 High U/L ALT70 High U/L AST83 High U/L Total Bilirubin2.1 High      Exslrau965 High mg/dL     TFY16cw/dL   Creatinine1.42 High      EKG:  Normal sinus rhythm   Incomplete right bundle branch block   Nonspecific ST abnormality   Abnormal ECG   When compared with ECG of 19-JUL-2022 14:00,   Premature atrial complexes are no longer Present       Brief History:     As documented in the medical record:  \"Pt is a 79 year male with history of Parkinsons, CKD, stroke, NSTEMI who presents with 3-4 days of abdominal pain, nausea, vomiting and overall feeling unwell. Pt reports the pain and fatigue began a few days ago and he has been vomiting after eating. Has reported multiple loose stools, body aches, generalized fatigue. Pt denies having episodes like this in the past. Pt denies any history of abdominal surgeries. Pt denies fever, chest pain, shortness of breath, pnd, orthopnea, leg swelling, cough, hematuria. He reports compliance to plavix. Was advised by friend to go to er after visiting him today. Patient denies any other symptoms at this time. Admitted for further work up and stabilization \"    The intitial assessment and plan included:  \"    * (Principal) Cholecystitis 10/26/2022 Yes     #Sepsis secondary to acute cholecystis   #Moderate Acute Cholecystitis,   -concern for Gallstone pancreatitis  -alk phos 230, lipase 845, bilirubin 3.1, ,   -lactic 3.1, wbc 13.4 w/left shift, ,   -UA negative for bilirubin -->concern for blocked duct   -CT abdomen: Liver is hypodense. 15 mm lamellated gallstone noted. Gallbladder wall thickening noted. Nml appearing pancreas. Fat containing bilateral inguinal hernias  -2 cultures obtian in ER  -Empiric Abx, IVF, urine output, analgesia with nsaid caution given TAMARA, Protonix, US of RUQ if questionable can consider MRCP, NPO, direct bili, prepare for surgery if deemed candidate, he has known findings of large stones in past imaging. Cholestatic liver pattern and no bili in urine. MRCP may delay care. Monitor for gangrene, perforation, peritonitis, ARDS, septic shock.  -Consider early cholecystectomy or percutaneous cholecystomy . Type and screen,   -can narrow abx to metro and ceftriaxone once intial resuscitation and work up complete . Fu on cultures . Serial abdominal exams   -confirm 1 large bore IVS. Must have good Iv access before admission to floors.    -ID and surgery consult #TAMARA on CKD3  -on admission Cr 1.7, bun 22, bicarb 23,   -Baseline cr 1.4,  BPH  -last echo EF 60-65% in 2021  -UA unrevealing   -NSAID with caution, IVF, bladder scans, au, reassess in morning     #CAD  -denied cp but had a cath in 6/22 for recurrent angina. Cath report:  1. Single vessel coronary artery disease. Patent stent in ostial LAD. Mild Nonobstructive CAD otherwise. LV gram not done due to chronic renal insufficiency. No stent placed, Imdur was added and risk factor modification   -troponin on admission 61-->46, with tamara on ckd  -hx of stent on plavix  -Plavix on hold for possible intervention []  -trend troponin and ekg  -Hold imdur incase hypotension due to shock.   -resume home metoprolol       #frequent falls  -hx of CVA, parkinson's, underlying parkinson's  -carotid US in 2019 without high grade stenosis  -fall precautions, neuro checks       #Lung nodule  -seen incidentally on ct abdomen: 10 mm spiculated nodule  right lung base. >8mm - immediate chest CT for further evaluation.  -Not seen on CT from 6/27/22  PLAN  FU on chest CT     #BPH  -bladder scans prn  -au placed for strict I and o\"     Database has included:  MR:  Impression:  Cholelithiasis with findings suggesting acute cholecystitis. No biliary or pancreatic ductal dilatation. No evidence of   choledocholithiasis. Mild hepatic steatosis.          Past Medical History:   has a past medical history of 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 ([address] CKD (chronic kidney disease) stage 2, GFR 60-89 ml/min, CKD (chronic kidney disease) stage 3, GFR 30-59 ml/min ([facility]), Diverticulosis of colon, GERD (gastroesophageal reflux disease), History of non-ST elevation myocardial infarction (NSTEMI) 6/2022, Impaired renal function, MRSA (methicillin resistant staph aureus) culture positive, Osteoarthritis of knee, Parkinson disease ([address] Proteinuria, Skull fracture ([address] Temporary loss of eyesight, and Tubular adenoma of colon. Social History:   reports that he has quit smoking. He has never used smokeless tobacco. He reports that he does not drink alcohol and does not use drugs. Family History:   Family History   Problem Relation Age of Onset    No Known Problems Mother     No Known Problems Father        Review of Systems:     Review of Systems   Constitutional:  Positive for appetite change (Diminished but improving). Respiratory:  Negative for cough and shortness of breath. Cardiovascular:  Negative for chest pain and palpitations. Gastrointestinal:  Positive for abdominal pain (Right upper quadrant pain resolved), nausea (Resolved) and vomiting (Resolved). Genitourinary:  Negative for flank pain and hematuria. Neurological:  Positive for tremors (Known Parkinson's). Physical Examination:        Vitals  BP (!) 107/57   Pulse 54   Temp 97.7 °F (36.5 °C) (Oral)   Resp 15   Ht 5' 7\" (1.702 m)   Wt 185 lb 3 oz (84 kg)   SpO2 96%   BMI 29.00 kg/m²   Temp (24hrs), Av.9 °F (36.6 °C), Min:97.4 °F (36.3 °C), Max:98.6 °F (37 °C)    Recent Labs     10/25/22  2114 10/27/22  0744   POCGLU 167* 103       Physical Exam  Vitals reviewed. Constitutional:       General: He is not in acute distress. Appearance: He is not diaphoretic. HENT:      Head: Normocephalic. Nose: Nose normal.   Eyes:      General: No scleral icterus. Conjunctiva/sclera: Conjunctivae normal.   Neck:      Trachea: No tracheal deviation. Cardiovascular:      Rate and Rhythm: Normal rate and regular rhythm. Pulmonary:      Effort: Pulmonary effort is normal. No respiratory distress. Breath sounds: Normal breath sounds. No wheezing or rales. Chest:      Chest wall: No tenderness. Abdominal:      General: There is no distension. Palpations: Abdomen is soft. Tenderness: There is no abdominal tenderness. There is no guarding (Negative Sandoval sign). Musculoskeletal:         General: No tenderness. Cervical back: Neck supple. Skin:     General: Skin is warm and dry. Neurological:      Mental Status: He is alert. Comments: Resting tremor  Bradykinesia       Medications: Allergies: Allergies   Allergen Reactions    Dye [Iodides]      pain    Aspirin      Brain bleed         Current Meds:   Scheduled Meds:    sodium chloride flush  5-40 mL IntraVENous 2 times per day    metoprolol tartrate  12.5 mg Oral BID    pantoprazole (PROTONIX) 40 mg injection  40 mg IntraVENous Daily    allopurinol  100 mg Oral Daily    amitriptyline  10 mg Oral Nightly    carbidopa-levodopa  1 tablet Oral BID    entacapone  200 mg Oral BID    gabapentin  100 mg Oral TID    rOPINIRole  0.25 mg Oral Nightly    magnesium oxide  400 mg Oral Daily    piperacillin-tazobactam  3,375 mg IntraVENous Q8H     Continuous Infusions:    sodium chloride      dextrose       PRN Meds: sodium chloride flush, sodium chloride, ondansetron **OR** ondansetron, acetaminophen **OR** acetaminophen, morphine, glucose, dextrose bolus **OR** dextrose bolus, glucagon (rDNA), dextrose, metoprolol    Data:     I/O (24Hr):     Intake/Output Summary (Last 24 hours) at 10/27/2022 1551  Last data filed at 10/27/2022 1510  Gross per 24 hour   Intake --   Output 2620 ml   Net -2620 ml       Labs:  Hematology:  Recent Labs     10/25/22  2126 10/26/22  0008 10/26/22  0206 10/27/22  0608   WBC 13.4*  --   --  8.8   RBC 4.74  --   --  4.02*   HGB 15.1  --   --  13.0   HCT 45.0  --   --  38.4*   MCV 94.9  --   --  95.5   MCH 31.9  --   --  32.3   MCHC 33.6  --   --  33.9   RDW 14.0  --   --  13.9     --   --  145   MPV 9.1  --   --  9.5   CRP  --  45.4*  --   --    INR  --  1.0 1.0  --      Chemistry:  Recent Labs     10/25/22  2126 10/26/22  0008 10/26/22  0634 10/26/22  1040 10/27/22  0608     --   --  135 135   K 4.2  --   --  3.8 4.4   CL 97*  --   --  110* 104   CO2 24  --   --  18* 21   GLUCOSE 148*  --   --  90 103*   BUN 22  --   --  17 21 CREATININE 1.71*  --   --  0.86 1.42*   ANIONGAP 15  --   --  7* 10   LABGLOM 39*  --   --  >60 49*   CALCIUM 9.4  --   --  6.6* 8.8   TROPHS 61* 46*  --   --   --    LACTACIDWB  --  3.1* 1.6  --   --      Recent Labs     10/25/22  2114 10/25/22  2126 10/26/22  1837 10/27/22  0608 10/27/22  0744   PROT  --  8.0 6.5 6.2*  --    LABALBU  --  4.5 3.4* 3.4*  --    AST  --  427* 123* 83*  --    ALT  --  363* 179* 70*  --    ALKPHOS  --  230* 152* 143*  --    BILITOT  --  3.1* 3.1* 2.1*  --    BILIDIR  --   --  2.3*  --   --    LIPASE  --  845*  --  16  --    POCGLU 167*  --   --   --  103     ABG:No results found for: POCPH, PHART, PH, POCPCO2, ZWK9SHK, PCO2, POCPO2, PO2ART, PO2, POCHCO3, WTZ5JAO, HCO3, NBEA, PBEA, BEART, BE, THGBART, THB, ATH1XIZ, GFWO7NFZ, Y3NXFHIQ, O2SAT, FIO2  Lab Results   Component Value Date/Time    SPECIAL LEFT HAND 2.5ML 10/26/2022 12:07 AM     Lab Results   Component Value Date/Time    CULTURE NO GROWTH 1 DAY 10/26/2022 12:07 AM       Radiology:  CT ABDOMEN PELVIS WO CONTRAST Additional Contrast? None    Result Date: 10/26/2022  Cholelithiasis and acute cholecystitis. Gallbladder ultrasound would be helpful for further characterization. 10 mm spiculated nodule right lung base. RECOMMENDATIONS: Guidelines for follow-up and management of pulmonary nodules found on abdomen CT: >8mm - immediate chest CT for further evaluation. Radiology 2017 http://pubs. rsna.org/doi/full/10.1148/radiol. 6961639677     XR CHEST (2 VW)    Result Date: 10/25/2022  No acute process. CT CHEST WO CONTRAST    Result Date: 10/26/2022  1. The spiculated 11 mm nodule described on the CT abdomen study demonstrates a more conspicuous central calcification, with multiple similar appearing areas of peribronchial nodules with calcifications seen in the right lower lobe, felt likely related to sequela of prior inflammatory/infectious process, and very unlikely to represent a neoplastic process.   No significant change from June of 2022. Given the degree of clinical concern of these findings, and follow-up recommendations as below, suggest repeat imaging in approximately 12 months. 2. Minimal dependent atelectasis. 3. Other incidental findings as above. RECOMMENDATIONS: Fleischner Society guidelines for follow-up and management of incidentally detected pulmonary nodules: Multiple Solid Nodules: Nodule size greater than 8 mm In a low-risk patient, CT at 3-6 months, then consider CT at 18-24 months. In a high-risk patient, CT at 3-6 months, then CT at 18-24 months. - Low risk patients include individuals with minimal or absent history of smoking and other known risk factors. - High risk patients include individuals with a history or smoking or known risk factors. Radiology 2017 http://pubs. rsna.org/doi/full/10.1148/radiol. 1698056067     MRI ABDOMEN WO CONTRAST MRCP    Result Date: 10/26/2022  Cholelithiasis with findings suggesting acute cholecystitis. No biliary or pancreatic ductal dilatation. No evidence of choledocholithiasis. Mild hepatic steatosis.        Assessment:        Primary Problem  Acute cholecystitis    Active Hospital Problems    Diagnosis Date Noted    TAMARA (acute kidney injury) ([facility] [N17.9] 10/27/2022     Priority: Medium    History of non-ST elevation myocardial infarction (NSTEMI) 6/2022 [I25.2] 10/27/2022     Priority: Medium    Gallstone pancreatitis [K85.10] 10/26/2022     Priority: Medium    Calculus of gallbladder with acute cholecystitis without obstruction [K80.00] 10/26/2022     Priority: Medium    Bandemia [D72.825] 10/26/2022     Priority: Medium    Pulmonary nodule [R91.1] 10/26/2022     Priority: Medium    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] 10/26/2022     Priority: Medium    Acute cholecystitis [K81.0] 10/26/2022     Priority: Medium    Coronary artery disease involving native heart with angina pectoris ([facility] [I25.119]      Priority: Medium    Stage 3b chronic kidney disease (CKD) ([facility] [N18.32] 04/20/2022 Priority: Medium    Parkinson disease ([facility] Maya Slim 01/16/2020    Pulmonary hypertension, mild ([facility] [I27.20] 08/31/2017    Essential hypertension [I10] 03/23/2015    CKD (chronic kidney disease) stage 3, GFR 30-59 ml/min ([facility] [N18.30] 03/04/2014    Intracranial bleed : traumatic left sub-dural hematoma ,managed conservatively in 2011 [I62.9] 07/09/2013         Plan:        Surgical consultation  Cholecystectomy under consideration for 10/29  Plavix on hold  Cardiology reevaluation for clearance -  TCC  Antibiotics per Infectious Disease service   Zosyn   Pain management  Trend LFTs  Trend lipase  Check bun and creatinine  Avoid nephrotoxins  Renal follow-up Dr. Daisy Rosenthal   Blood Pressure - Monitor and control   Pulmonary nodule surveillance and follow-up outpatient  Sinemet  PT/OT  Neurology follow-up Dr. Cam Morales: Parkinson's   DVT prophylaxis    IP CONSULT TO [address] [facility] TO HOSPITALIST  IP CONSULT TO INFECTIOUS DISEASES  IP CONSULT TO DO Mary  10/27/2022  3:51 PM

## 2022-10-27 NOTE — PLAN OF CARE
Problem: Skin/Tissue Integrity  Goal: Absence of new skin breakdown  Description: 1. Monitor for areas of redness and/or skin breakdown  2. Assess vascular access sites hourly  3. Every 4-6 hours minimum:  Change oxygen saturation probe site  4. Every 4-6 hours:  If on nasal continuous positive airway pressure, respiratory therapy assess nares and determine need for appliance change or resting period.   10/27/2022 1708 by Toni Griggs RN  Outcome: Progressing  10/27/2022 1707 by Toni Griggs RN  Outcome: Progressing  10/27/2022 1705 by Toni Griggs RN  Outcome: Progressing  10/27/2022 0648 by Morenita Keys RN  Outcome: Progressing     Problem: Safety - Adult  Goal: Free from fall injury  10/27/2022 1708 by Toni Griggs RN  Outcome: Progressing  10/27/2022 1707 by Toni Griggs RN  Outcome: Progressing  10/27/2022 1705 by Toni Griggs RN  Outcome: Progressing  10/27/2022 0648 by Morenita Keys RN  Outcome: Progressing     Problem: ABCDS Injury Assessment  Goal: Absence of physical injury  10/27/2022 1708 by Toni Griggs RN  Outcome: Progressing  10/27/2022 1707 by Toni Griggs RN  Outcome: Progressing  10/27/2022 1705 by Toni Griggs RN  Outcome: Progressing  10/27/2022 0648 by Morenita Keys RN  Outcome: Progressing     Problem: Chronic Conditions and Co-morbidities  Goal: Patient's chronic conditions and co-morbidity symptoms are monitored and maintained or improved  10/27/2022 1708 by Toni Griggs RN  Outcome: Progressing  10/27/2022 1707 by Toni Griggs RN  Outcome: Progressing  10/27/2022 1705 by Toni Griggs RN  Outcome: Progressing

## 2022-10-27 NOTE — CARE COORDINATION
10/27/22 1445   Service Assessment   Patient Orientation Alert and Oriented   Cognition Alert   History Provided By Patient   Primary Caregiver [facility]  (son Obi Powell)   [address] [facility] is: Legal Next of Maikol London   PCP Verified by CM Yes   Last Visit to PCP Within last 3 months   Prior Functional Level Independent in ADLs/IADLs; Shopping;Cooking   Current Functional Level Independent in ADLs/IADLs; Cooking; Shopping   Can patient return to prior living arrangement Unknown at present   Ability to make needs known: Fair   Family able to assist with home care needs: No   Would you like for me to discuss the discharge plan with any other family members/significant others, and if so, who? Yes  (josselin Hallman)   Financial Resources Medicare   Social/Functional History   Lives With [facility]   ADL Assistance Independent   Homemaking Assistance Independent   Ambulation Assistance Independent  (Pt ambulate with cane)   Transfer Assistance Independent   Active  Yes   Mode of Transportation Car   Discharge Planning   Current Services Prior To Admission Meals On Wheels   Potential Assistance Purchasing Medications No   Type of [address]   (awaiting therpy notes)   Patient expects to be discharged to: [facility] Discharge   Transition of Care Consult (CM Consult) SNF   Mode of Transport at Discharge   (friend to transport if goes home)   Case Management Assessment  Initial Evaluation    Date/Time of Evaluation: 10/27/2022 3:29 PM  Assessment Completed by: Naesem Sanchez RN    If patient is discharged prior to next notation, then this note serves as note for discharge by case management.     Patient Name: Christiano Masters                   YOB: 1940  Diagnosis: Cholecystitis [K81.9]  Gallstone pancreatitis [K85.10]                   Date / Time: 10/25/2022  8:54 PM    Patient Admission Status: Inpatient Readmission Risk (Low < 19, Mod (19-27), High > 27): Readmission Risk Score: 13.9    Current PCP: JW Patel CNP  PCP verified by CM? Yes    History Provided by: Patient  Patient Orientation: Alert and Oriented    Patient Cognition: Alert    Advance Directives:      Code Status: Full Code   Patient's Primary Decision Maker is: Legal Next of Kin    Primary Decision MakerGayle Guevara - [phone]    Secondary Decision Maker: Deep Freedman  [phone]    Discharge Planning:    Patient lives with: Alone Type of Home: Other (Comment) (duplex)  Primary Care Giver: Self  Patient Support Systems include: Children (son Sandor Damon)   Current Financial resources: Medicare  Current community resources:    Current services prior to admission: Meals On Wheels            Current DME:              Type of Home Care services:   (awaiting therpy notes)    ADLS  Prior functional level: Independent in ADLs/IADLs, Shopping, Cooking  Current functional level: Independent in ADLs/IADLs, Cooking, Shopping    PT AM-PAC: 16 /24  OT AM-PAC:   /24    Family can provide assistance at DC: No  Would you like Case Management to discuss the discharge plan with any other family members/significant others, and if so, who? Yes (son Corson)  Plans to Return to Present Housing: Unknown at present  Other Identified Issues/Barriers to RETURNING to current housing: Awaiting therapy eval.  Pt states he has Parkisons and gets spells where he has difficulty. Lives alone.     Potential Assistance needed at discharge: N/A            Potential DME:    Patient expects to discharge to: [facility]  for transportation at discharge:      Financial    Payor: Migel Ortega / Plan: Migel Ortega / Product Type: *No Product type* /     Potential assistance Purchasing Medications: No  Meds-to-Beds request: Yes      [address] [place] [address], [address] [place] [address] [place]  [address] [facility] [address]  Phone: [phone] Fax: [address] [facility], [address] Deepak Kiser [phone] [place] [phone]  [place] [address] [address]  Phone: [phone] Fax: [phone]      Notes:  Pt states they really helped me at [facility]        Barriers to discharge: Limited family support    Additional Case Management Notes: Pt states he was at [facility] in the past and is agreeable to return if needed. The Plan for Transition of Care is related to the following treatment goals of Cholecystitis [K81.9]  Gallstone pancreatitis [F91.18]    IF APPLICABLE: The Patient and/or patient representative Isabel Haq and his family were provided with a choice of provider and agrees with the discharge plan. Freedom of choice list with basic dialogue that supports the patient's individualized plan of care/goals and shares the quality data associated with the providers was provided to:     Patient Representative Name:       The Patient and/or Patient Representative Agree with the Discharge Plan? Yes    16:00 spoke with Regan Tillman at [facility]. She will begin precert.     Esau Tinajero RN  Case Management Department  Ph: [phone]: [phone]

## 2022-10-27 NOTE — PROGRESS NOTES
[facility] - Progress Note    Today's Date and Time: 10/27/2022, 2:18 PM    Impression :   Acute cholecystitis  No biliary or pancreatic ductal dilation or choledocholithiasis on MRCP  Cholelithiasis  Pulmonary nodule  Transaminitis  Leukocytosis  TAMARA-resolved  Hx Parkinson's  Hx MRSA on leg wound 2015    Recommendations:   Continue IV Zosyn for cholecystitis   Closely monitor patient's progress  Follow-up on culture results    Medical Decision Making/Summary/Discussion:10/27/2022     Patient with acute cholelithiasis and cholecystitis  MRCP completed with no biliary or pancreatic ductal dilation or choledocholithiasis. Infection Control Recommendations   [place] Precautions    Antimicrobial Stewardship Recommendations   Discontinuation of therapy    Coordination of Outpatient Care:   Estimated Length of IV antimicrobials:TBD  Patient will need Midline Catheter Insertion: TBD  Patient will need PICC line Insertion:BD  Patient will need: Home IV , [place],  SNF,  LTAC: TBD  Patient will need outpatient wound care:    Chief complaint/reason for consultation:   Antibiotic recommendations for acute cholecystitis    History of Present Illness:   Jesus Matthews is a 79y.o.-year-old male who was initially admitted on 10/25/2022. Patient seen at the request of Dr. Hever Bull. INITIAL HISTORY:    This patient presented to the ED on 10/25/22 with complaints of generalized weakness as well as abdominal pain and 3-4 episodes of emesis over the past couple days. Lab work revealed elevated AST/ALT, bilirubin, lipase and alk phos. A CT abdomen revealed acute cholecystitis with cholelithiasis. A CT chest was also completed that showed a 10 mm spiculated nodule in the right lung base. General surgery was consulted and an MRCP was ordered.    MRCP findings include cholelithiasis with acute cholecystitis  No biliary or pancreatic ductal dilation and no evidence of choledocholithiasis. The patient received one dose of Zosyn. On 10/26/22    IMAGING:  CT ABDOMEN PELVIS WO CONTRAST Additional Contrast? None     Result Date: 10/26/2022  Cholelithiasis and acute cholecystitis. Gallbladder ultrasound would be helpful for further characterization. 10 mm spiculated nodule right lung base. RECOMMENDATIONS: Guidelines for follow-up and management of pulmonary nodules found on abdomen CT: >8mm - immediate chest CT for further evaluation. Radiology 2017 http://pubs. rsna.org/doi/full/10.1148/radiol. 7261206010      XR CHEST (2 VW)     Result Date: 10/25/2022  No acute process. MRCP 10/26/22  Impression   Cholelithiasis with findings suggesting acute cholecystitis. No biliary or pancreatic ductal dilatation. No evidence of   choledocholithiasis. Mild hepatic steatosis. CURRENT EVALUATION 10/27/2022  BP (!) 101/56   Pulse 50   Temp 97.4 °F (36.3 °C) (Oral)   Resp 16   Ht 5' 7\" (1.702 m)   Wt 185 lb 3 oz (84 kg)   SpO2 97%   BMI 29.00 kg/m²     Afebrile  VS stable on room air    Pt slept comfortably  No acute events overnight  Doing well this morning    Pt denies shortness of breath, chest pain, fever, chills, nausea, or  vomiting. Currently on NPO with small sips of water. General surgery planning on advancing to [facility] today and is considering laparoscopic cholecystectomy on 10/29/22. AST, ALT, ALP, Bilirubin, and Lipase are trending downwards    Labs, X rays reviewed: 10/27/2022    BUN: 22-->21  Cr: 1.71-->1.42    WBC: 13.4-->8.8  Hb: 15.1-->13  Plat: 204-->145    ALT: 363-->70  AST: 427-->83  Alk phos: 230-->143  Bili: 3.1-->2.1  Lipase: 845-->16    CRP: 45.4    Cultures:  Urine:  10/25/22: No significant growth  Blood:  10/26/22: No growth  Sputum :    Wound:    MRSA Nares:    Imaging:  CT Chest 10/25/22      CT abd/pelvis 10/25/22      Discussed with patient, RN, family.     I have personally reviewed the past medical history, past surgical history, medications, social history, and family history, and I have updated the database accordingly.   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 ([facility] [address] 3/18/2011    d/t being hurt on the job    CKD (chronic kidney disease) stage 2, GFR 60-89 ml/min     CKD (chronic kidney disease) stage 3, GFR 30-59 ml/min ([facility])     Diverticulosis of colon     GERD (gastroesophageal reflux disease)     Impaired renal function     MRSA (methicillin resistant staph aureus) culture positive 9/23/2015    leg    Osteoarthritis of knee     Left    Parkinson disease ([facility] [address]     Proteinuria     Skull fracture ([facility] [address] 3/18/2011    d/t 12-foot drop on the job    Temporary loss of eyesight     periodically, happened x7    Tubular adenoma of colon 2012, 2017    mulitple     Past Surgical  History:     Past Surgical History:   Procedure Laterality Date    COLONOSCOPY  11/02/2012    poor prep, tubular adenoma    COLONOSCOPY  09/19/2017    diverticulosis, multiple polyps as described, spot marking done, pathology-tubular adenoma x 4    UT COLSC FLX W/RMVL OF TUMOR POLYP LESION SNARE TQ N/A 9/19/2017    COLONOSCOPY POLYPECTOMY SNARE/COLD BIOPSY with tatooing and apc transverse colon performed by Tracey Santos MD at [address] [facility] Right     rotator cuff     Medications:      sodium chloride flush  5-40 mL IntraVENous 2 times per day    metoprolol tartrate  12.5 mg Oral BID    pantoprazole (PROTONIX) 40 mg injection  40 mg IntraVENous Daily    allopurinol  100 mg Oral Daily    amitriptyline  10 mg Oral Nightly    carbidopa-levodopa  1 tablet Oral BID    entacapone  200 mg Oral BID    gabapentin  100 mg Oral TID    rOPINIRole  0.25 mg Oral Nightly    magnesium oxide  400 mg Oral Daily    piperacillin-tazobactam  3,375 mg IntraVENous Q8H     Social History:     Social History     Socioeconomic History    Marital status: Single     Spouse name: Not on file    Number of children: Not on file    Years of education: Not on file    Highest education level: Not on file   Occupational History    Not on file   Tobacco Use    Smoking status: Former    Smokeless tobacco: Never   Vaping Use    Vaping Use: Never used   Substance and Sexual Activity    Alcohol use: No    Drug use: No    Sexual activity: Not Currently   Other Topics Concern    Not on file   Social History Narrative    Not on file     Social Determinants of Health     Financial Resource Strain: Low Risk     Difficulty of Paying Living Expenses: Not hard at all   Food Insecurity: No Food Insecurity    Worried About [address] in the Last Year: Never true    [address] [facility] [address] in the Last Year: Never true   Transportation Needs: No Transportation Needs    Lack of Transportation (Medical): No    Lack of Transportation (Non-Medical): No   Physical Activity: Inactive    Days of Exercise per Week: 0 days    Minutes of Exercise per Session: 0 min   Stress: Stress Concern Present    Feeling of Stress : To some extent   Social Connections: Socially Isolated    Frequency of Communication with Friends and Family: Three times a week    Frequency of Social Gatherings with Friends and Family: Three times a week    Attends [employer] Services: Never    Active Member of Clubs or Organizations: No    Attends Club or Organization Meetings: Never    Marital Status:    Intimate Partner Violence: Not At Risk    Fear of Current or Ex-Partner: No    Emotionally Abused: No    Physically Abused: No    Sexually Abused: No   Housing Stability: Low Risk     Unable to Pay for Housing in the Last Year: No    Number of Places Lived in the Last Year: 1    Unstable Housing in the Last Year: No     Family History:     Family History   Problem Relation Age of Onset    No Known Problems Mother     No Known Problems Father       Allergies:   Dye [iodides] and Aspirin     Review of Systems:   Constitutional: No fevers or chills.  No systemic complaints  Head: No headaches  Eyes: No double vision or blurry vision. No conjunctival inflammation. ENT: No sore throat or runny nose. . No hearing loss, tinnitus or vertigo. Cardiovascular: No chest pain or palpitations. No shortness of breath. No METZ  Lung: No shortness of breath or cough. No sputum production  Abdomen: No nausea, vomiting, diarrhea, or abdominal pain. [place] No cramps. Genitourinary: No increased urinary frequency, or dysuria. No hematuria. No suprapubic or CVA pain  Musculoskeletal: No muscle aches or pains. No joint effusions, swelling or deformities  Hematologic: No bleeding or bruising. Neurologic: No headache, weakness, numbness, or tingling. Integument: No rash, no ulcers. Psychiatric: No depression. Endocrine: No polyuria, no polydipsia, no polyphagia. Physical Examination :   Patient Vitals for the past 8 hrs:   BP Temp Temp src Pulse Resp SpO2 Weight   10/27/22 1148 (!) 101/56 97.4 °F (36.3 °C) Oral 50 16 97 % --   10/27/22 0940 114/62 -- -- -- -- -- --   10/27/22 0754 (!) 113/54 97.7 °F (36.5 °C) Oral 54 16 96 % --   10/27/22 0630 -- -- -- -- -- -- 185 lb 3 oz (84 kg)     General Appearance: Awake, alert, and in no apparent distress  Head:  Normocephalic, no trauma  Eyes: Pupils equal, round, reactive to light and accommodation; extraocular movements intact; sclera anicteric; conjunctivae pink. No embolic phenomena. ENT: Oropharynx clear, without erythema, exudate, or thrush. No tenderness of sinuses. Mouth/throat: mucosa pink and moist. No lesions. Dentition in good repair. Neck:Supple, without lymphadenopathy. Thyroid normal, No bruits. Pulmonary/Chest: Clear to auscultation, without wheezes, rales, or rhonchi. No dullness to percussion. Cardiovascular: Regular rate and rhythm without murmurs, rubs, or gallops. Abdomen: Soft, non tender. Bowel sounds normal. No organomegaly  All four Extremities: No cyanosis, clubbing, edema, or effusions.   Neurologic: No gross sensory or motor deficits. Skin: Warm and dry with good turgor. No signs of peripheral arterial or venous insufficiency. No ulcerations. No open wounds. Medical Decision Making -Laboratory:   I have independently reviewed/ordered the following labs:    CBC with Differential:   Recent Labs     10/25/22  2126 10/27/22  0608   WBC 13.4* 8.8   HGB 15.1 13.0   HCT 45.0 38.4*    145   LYMPHOPCT 3* 11*   MONOPCT 4 9     BMP:   Recent Labs     10/26/22  1040 10/27/22  0608    135   K 3.8 4.4   * 104   CO2 18* 21   BUN 17 21   CREATININE 0.86 1.42*     Hepatic Function Panel:   Recent Labs     10/26/22  1837 10/27/22  0608   PROT 6.5 6.2*   LABALBU 3.4* 3.4*   BILIDIR 2.3*  --    IBILI 0.8  --    BILITOT 3.1* 2.1*   ALKPHOS 152* 143*   * 70*   * 83*     No results for input(s): RPR in the last 72 hours. No results for input(s): HIV in the last 72 hours. No results for input(s): BC in the last 72 hours. Lab Results   Component Value Date/Time    MUCUS NOT REPORTED 02/16/2022 12:09 PM    RBC 4.02 10/27/2022 06:08 AM    RBC 5.14 02/24/2012 11:00 AM    TRICHOMONAS NOT REPORTED 02/16/2022 12:09 PM    WBC 8.8 10/27/2022 06:08 AM    YEAST NOT REPORTED 02/16/2022 12:09 PM    TURBIDITY Clear 10/25/2022 10:25 PM     Lab Results   Component Value Date/Time    CREATININE 1.42 10/27/2022 06:08 AM    GLUCOSE 103 10/27/2022 06:08 AM    GLUCOSE 97 04/24/2012 12:55 PM     Medical Decision Making-Imaging:     Narrative   EXAMINATION:   CT OF THE CHEST WITHOUT CONTRAST 10/26/2022 1:29 am       TECHNIQUE:   CT of the chest was performed without the administration of intravenous   contrast. Multiplanar reformatted images are provided for review. Automated   exposure control, iterative reconstruction, and/or weight based adjustment of   the mA/kV was utilized to reduce the radiation dose to as low as reasonably   achievable.        COMPARISON:   CT abdomen and pelvis the previous day, CT chest on 06/27/2022 Minimal dependent atelectasis. 3. Other incidental findings as above. RECOMMENDATIONS:   Fleischner Society guidelines for follow-up and management of incidentally   detected pulmonary nodules:       Multiple Solid Nodules:       Nodule size greater than 8 mm   In a low-risk patient, CT at 3-6 months, then consider CT at 18-24 months. In a high-risk patient, CT at 3-6 months, then CT at 18-24 months. - Low risk patients include individuals with minimal or absent history of   smoking and other known risk factors. - High risk patients include individuals with a history or smoking or known   risk factors. Radiology 2017 http://pubs. rsna.org/doi/full/10.1148/radiol. 6124737243         Narrative   EXAMINATION:   CT OF THE ABDOMEN AND PELVIS WITHOUT CONTRAST 10/25/2022 11:38 pm       TECHNIQUE:   CT of the abdomen and pelvis was performed without the administration of   intravenous contrast. Multiplanar reformatted images are provided for review. Automated exposure control, iterative reconstruction, and/or weight based   adjustment of the mA/kV was utilized to reduce the radiation dose to as low   as reasonably achievable. COMPARISON:   None. HISTORY:   ORDERING SYSTEM PROVIDED HISTORY: eval for gallstone pancreatitis   TECHNOLOGIST PROVIDED HISTORY:   eval for gallstone pancreatitis       Decision Support Exception - unselect if not a suspected or confirmed   emergency medical condition->Emergency Medical Condition (MA)   Reason for Exam: eval for gallstone pancreatitis       FINDINGS:   Lower Chest: Lad stent/coronary artery disease. 10 mm spiculated nodule   right lung base. Organs: Liver is hypodense. 15 mm lamellated gallstone noted. Gallbladder   wall thickening noted. Adrenals and pancreas normal.       GI/Bowel: Small hiatal hernia. Small large bowel normal.  Appendix normal.       Pelvis: Fat containing bilateral inguinal hernias. Peritoneum/Retroperitoneum: Calcified plaque along the aorta and its branches. Bones/Soft Tissues: Spondylosis and multilevel vacuum disc phenomena. Slight   anterior subluxation L4-5. Impression   Cholelithiasis and acute cholecystitis. Gallbladder ultrasound would be   helpful for further characterization. 10 mm spiculated nodule right lung base. RECOMMENDATIONS:   Guidelines for follow-up and management of pulmonary nodules found on abdomen   CT:       >8mm - immediate chest CT for further evaluation. Radiology 2017 http://pubs. rsna.org/doi/full/10.1148/radiol. 7162502739     Narrative   EXAMINATION:   MRI OF THE ABDOMEN WITHOUT CONTRAST AND MRCP 10/26/2022 9:47 am       TECHNIQUE:   Multiplanar multisequence MRI of the abdomen was performed without the   administration of intravenous contrast.  After initial T2 axial and coronal   images, thick slab, thin slab and 3D coronal MRCP sequences were obtained   without the administration of intravenous contrast.  MIP images are provided   for review. COMPARISON:   10/25/2022 CT abdomen/pelvis       HISTORY:   ORDERING SYSTEM PROVIDED HISTORY: cholelithiasis elevated lft's   ,?choledocholithiasis   TECHNOLOGIST PROVIDED HISTORY:   cholelithiasis elevated lft's ,?choledocholithiasis   What is the sedation requirement?->None       FINDINGS:   Gallbladder: There is cholelithiasis with a large stone measuring   approximately 19 mm in size. There is gallbladder wall thickening measuring   approximately 7 mm in thickness. There is also pericholecystic fluid. Findings can be seen with acute cholecystitis in the proper clinical setting. Bile Ducts: There is no evidence of common bile duct dilatation. Common bile   duct measures approximately 4 mm in diameter. No intrahepatic or   extrahepatic biliary ductal dilatation. No evidence of choledocholithiasis. No evidence of common bile duct stricture or obstruction. Pancreatic Duct: No pancreatic ductal dilatation or obstruction. No   pancreatic ductal stones. No evidence of pancreatic divisum. Other:  Examination not tailored for the evaluation of the solid abdominal   organs. There is mild diffuse hepatic steatosis. No evidence of   hydronephrosis. No evidence of abdominal ascites or lymphadenopathy. Visualized bowel is grossly unremarkable. Osseous structures demonstrate no   acute abnormality. Impression   Cholelithiasis with findings suggesting acute cholecystitis. No biliary or pancreatic ductal dilatation. No evidence of   choledocholithiasis. Mild hepatic steatosis. Narrative   EXAMINATION:   TWO XRAY VIEWS OF THE CHEST       10/25/2022 9:44 pm       COMPARISON:   July 19, 2022       HISTORY:   ORDERING SYSTEM PROVIDED HISTORY: eval for pneumonia   TECHNOLOGIST PROVIDED HISTORY:   eval for pneumonia       FINDINGS:   The lungs are without acute focal process. There is no effusion or   pneumothorax. The cardiomediastinal silhouette is without acute process. The   osseous structures are without acute process. Impression   No acute process. Medical Decision Xougbb-Poznrxrr-Ozybb:     Medical Decision Making-Other:   Note:  Labs, medications, radiologic studies were reviewed with personal review of films  Large amounts of data were reviewed  Discussed with nursing Staff, Discharge planner  Infection Control and Prevention measures reviewed  All prior entries were reviewed  Administer medications as ordered  Prognosis: [address]  Discharge planning reviewed    Thank you for allowing us to participate in the care of this patient. Please call with questions. [address]; [facility], [facility] [address] participated in the evaluation of this patient, under supervision.     ATTESTATION:    I have discussed the case, including pertinent history and exam findings with the residents and students. I have seen and examined the patient and the key elements of the encounter have been performed by me. I was present when the student obtained his information or examined the patient. I have reviewed the laboratory data, other diagnostic studies and discussed them with the residents. I have updated the medical record where necessary. I agree with the assessment, plan and orders as documented by the resident/ student.     Krystin Olmedo MD.        Pager: [phone] - Office: [phone]

## 2022-10-28 LAB
ABSOLUTE EOS #: 0.22 K/UL (ref 0–0.44)
ABSOLUTE IMMATURE GRANULOCYTE: 0.05 K/UL (ref 0–0.3)
ABSOLUTE LYMPH #: 0.73 K/UL (ref 1.1–3.7)
ABSOLUTE MONO #: 0.45 K/UL (ref 0.1–1.2)
ALBUMIN SERPL-MCNC: 3.3 G/DL (ref 3.5–5.2)
ALBUMIN/GLOBULIN RATIO: 1 (ref 1–2.5)
ALP BLD-CCNC: 148 U/L (ref 40–129)
ALT SERPL-CCNC: 21 U/L (ref 5–41)
ANION GAP SERPL CALCULATED.3IONS-SCNC: 12 MMOL/L (ref 9–17)
AST SERPL-CCNC: 48 U/L
BASOPHILS # BLD: 0 % (ref 0–2)
BASOPHILS ABSOLUTE: <0.03 K/UL (ref 0–0.2)
BILIRUB SERPL-MCNC: 1.3 MG/DL (ref 0.3–1.2)
BUN BLDV-MCNC: 15 MG/DL (ref 8–23)
CALCIUM IONIZED: 1.15 MMOL/L (ref 1.13–1.33)
CALCIUM SERPL-MCNC: 9 MG/DL (ref 8.6–10.4)
CHLORIDE BLD-SCNC: 101 MMOL/L (ref 98–107)
CO2: 22 MMOL/L (ref 20–31)
CREAT SERPL-MCNC: 1.35 MG/DL (ref 0.7–1.2)
EOSINOPHILS RELATIVE PERCENT: 3 % (ref 1–4)
GFR SERPL CREATININE-BSD FRML MDRD: 52 ML/MIN/1.73M2
GLUCOSE BLD-MCNC: 113 MG/DL (ref 70–99)
HCT VFR BLD CALC: 40.2 % (ref 40.7–50.3)
HEMOGLOBIN: 13.3 G/DL (ref 13–17)
IMMATURE GRANULOCYTES: 1 %
LIPASE: 15 U/L (ref 13–60)
LYMPHOCYTES # BLD: 10 % (ref 24–43)
MCH RBC QN AUTO: 31.1 PG (ref 25.2–33.5)
MCHC RBC AUTO-ENTMCNC: 33.1 G/DL (ref 28.4–34.8)
MCV RBC AUTO: 94.1 FL (ref 82.6–102.9)
MONOCYTES # BLD: 6 % (ref 3–12)
NRBC AUTOMATED: 0 PER 100 WBC
PDW BLD-RTO: 13.8 % (ref 11.8–14.4)
PLATELET # BLD: 158 K/UL (ref 138–453)
PMV BLD AUTO: 9.7 FL (ref 8.1–13.5)
POTASSIUM SERPL-SCNC: 4.3 MMOL/L (ref 3.7–5.3)
RBC # BLD: 4.27 M/UL (ref 4.21–5.77)
SEG NEUTROPHILS: 80 % (ref 36–65)
SEGMENTED NEUTROPHILS ABSOLUTE COUNT: 6.25 K/UL (ref 1.5–8.1)
SODIUM BLD-SCNC: 135 MMOL/L (ref 135–144)
TOTAL PROTEIN: 6.6 G/DL (ref 6.4–8.3)
WBC # BLD: 7.7 K/UL (ref 3.5–11.3)

## 2022-10-28 PROCEDURE — 2580000003 HC RX 258: Performed by: FAMILY MEDICINE

## 2022-10-28 PROCEDURE — 80053 COMPREHEN METABOLIC PANEL: CPT

## 2022-10-28 PROCEDURE — 82330 ASSAY OF CALCIUM: CPT

## 2022-10-28 PROCEDURE — APPSS30 APP SPLIT SHARED TIME 16-30 MINUTES: Performed by: NURSE PRACTITIONER

## 2022-10-28 PROCEDURE — 97535 SELF CARE MNGMENT TRAINING: CPT

## 2022-10-28 PROCEDURE — 6360000002 HC RX W HCPCS: Performed by: FAMILY MEDICINE

## 2022-10-28 PROCEDURE — 1200000000 HC SEMI PRIVATE

## 2022-10-28 PROCEDURE — 6370000000 HC RX 637 (ALT 250 FOR IP): Performed by: NURSE PRACTITIONER

## 2022-10-28 PROCEDURE — 85025 COMPLETE CBC W/AUTO DIFF WBC: CPT

## 2022-10-28 PROCEDURE — 83690 ASSAY OF LIPASE: CPT

## 2022-10-28 PROCEDURE — 97110 THERAPEUTIC EXERCISES: CPT

## 2022-10-28 PROCEDURE — 2580000003 HC RX 258: Performed by: STUDENT IN AN ORGANIZED HEALTH CARE EDUCATION/TRAINING PROGRAM

## 2022-10-28 PROCEDURE — 36415 COLL VENOUS BLD VENIPUNCTURE: CPT

## 2022-10-28 PROCEDURE — 97530 THERAPEUTIC ACTIVITIES: CPT

## 2022-10-28 PROCEDURE — 6370000000 HC RX 637 (ALT 250 FOR IP): Performed by: FAMILY MEDICINE

## 2022-10-28 PROCEDURE — 6360000002 HC RX W HCPCS: Performed by: STUDENT IN AN ORGANIZED HEALTH CARE EDUCATION/TRAINING PROGRAM

## 2022-10-28 PROCEDURE — 99232 SBSQ HOSP IP/OBS MODERATE 35: CPT | Performed by: INTERNAL MEDICINE

## 2022-10-28 PROCEDURE — C9113 INJ PANTOPRAZOLE SODIUM, VIA: HCPCS | Performed by: FAMILY MEDICINE

## 2022-10-28 PROCEDURE — 2580000003 HC RX 258: Performed by: NURSE PRACTITIONER

## 2022-10-28 RX ADMIN — MAGNESIUM GLUCONATE 500 MG ORAL TABLET 400 MG: 500 TABLET ORAL at 08:37

## 2022-10-28 RX ADMIN — GABAPENTIN 100 MG: 100 CAPSULE ORAL at 20:24

## 2022-10-28 RX ADMIN — CARBIDOPA AND LEVODOPA 1 TABLET: 25; 100 TABLET ORAL at 08:37

## 2022-10-28 RX ADMIN — PIPERACILLIN AND TAZOBACTAM 3375 MG: 3; .375 INJECTION, POWDER, FOR SOLUTION INTRAVENOUS at 11:31

## 2022-10-28 RX ADMIN — GABAPENTIN 100 MG: 100 CAPSULE ORAL at 08:37

## 2022-10-28 RX ADMIN — ACETAMINOPHEN 650 MG: 325 TABLET ORAL at 21:11

## 2022-10-28 RX ADMIN — ROPINIROLE HYDROCHLORIDE 0.25 MG: 0.25 TABLET, FILM COATED ORAL at 20:24

## 2022-10-28 RX ADMIN — CARBIDOPA AND LEVODOPA 1 TABLET: 25; 100 TABLET ORAL at 20:24

## 2022-10-28 RX ADMIN — PIPERACILLIN AND TAZOBACTAM 3375 MG: 3; .375 INJECTION, POWDER, FOR SOLUTION INTRAVENOUS at 20:11

## 2022-10-28 RX ADMIN — AMITRIPTYLINE HYDROCHLORIDE 10 MG: 10 TABLET, FILM COATED ORAL at 20:24

## 2022-10-28 RX ADMIN — SODIUM CHLORIDE, PRESERVATIVE FREE 40 MG: 5 INJECTION INTRAVENOUS at 08:37

## 2022-10-28 RX ADMIN — PIPERACILLIN AND TAZOBACTAM 3375 MG: 3; .375 INJECTION, POWDER, FOR SOLUTION INTRAVENOUS at 03:56

## 2022-10-28 RX ADMIN — SODIUM CHLORIDE, PRESERVATIVE FREE 10 ML: 5 INJECTION INTRAVENOUS at 20:15

## 2022-10-28 RX ADMIN — GABAPENTIN 100 MG: 100 CAPSULE ORAL at 13:54

## 2022-10-28 RX ADMIN — ALLOPURINOL 100 MG: 100 TABLET ORAL at 08:37

## 2022-10-28 RX ADMIN — ENTACAPONE 200 MG: 200 TABLET, FILM COATED ORAL at 08:31

## 2022-10-28 RX ADMIN — ENTACAPONE 200 MG: 200 TABLET, FILM COATED ORAL at 20:24

## 2022-10-28 ASSESSMENT — ENCOUNTER SYMPTOMS
NAUSEA: 0
COUGH: 0
VOMITING: 0
ABDOMINAL PAIN: 0
SHORTNESS OF BREATH: 0

## 2022-10-28 ASSESSMENT — PAIN DESCRIPTION - LOCATION: LOCATION: HEAD

## 2022-10-28 ASSESSMENT — PAIN SCALES - GENERAL: PAINLEVEL_OUTOF10: 3

## 2022-10-28 NOTE — PROGRESS NOTES
[facility]  Office: [address], DO, Cesario Carlson, DO, Odette Nolan, DO, Eloy Kim Blood, DO, Carlos Hanna MD, Stacia Dorsey MD, Yvone Phalen, MD, Michael Shahid MD,  Homer Hashimoto, MD, Antoinette Moore MD, Lavinia Hernandez, DO, Doretha Lopez MD,  Swapna Welhc MD, Debbie Calloway MD, Sorin Arias, DO, Carolyn Peraza MD, Mackenzie Diamond MD, Prashanth Cruz DO, Sonam David MD, Betsy Diez MD, Say Martinez MD, Alyssa Black MD, Ming Booker DO, Harsh Mccarthy MD, Natan العلي MD, Toshia Alamo, CNP,  Nancy Miller, CNP, Stefania Page, CNP, Cat Casillas, CNP,  Sabino Sarkar, [facility], Selina Howard, CNP, Isreal Reynoso, CNP, Esmer Craig CNP, Merna Villalobos, CNP, Cornel Rashid, CNP, Jennifer Trujillo, PAAydinC, Kelsi Callejas, CNS, Johana Braun, [facility], Ruslan Rodriguez, CNP, Agusto Moore [facility], Kenneth Barksdale, [facility]    Progress Note    10/28/2022    2:29 PM    Name:   Rhett Juarez  MRN:     [id number]     Acct:      [de-identified]   Room:   [address] [place] [address] Day:  2  Admit Date:  10/25/2022  8:54 PM    PCP:   JW Salinas - CNP  Code Status:  Full Code    Subjective:     C/C:   Chief Complaint   Patient presents with    Fatigue     Generalized weakness, intermittent, hx of parkinsons      Interval History Status:   Improved  No abdominal pain  Doing okay with clear liquids  No nausea or vomiting    Data Base Updates:  [id number] High mg/dL     SPD64la/dL   Creatinine1.35 High      WBC7.7k/uL RBC4.27m/uL Drgqwscyqp35.3     Calcium, Ionized1.15     Brief History:     As documented in the medical record:  \"Pt is a 79 year male with history of Parkinsons, CKD, stroke, NSTEMI who presents with 3-4 days of abdominal pain, nausea, vomiting and overall feeling unwell. Pt reports the pain and fatigue began a few days ago and he has been vomiting after eating.  Has reported multiple loose stools, body aches, generalized fatigue. Pt denies having episodes like this in the past. Pt denies any history of abdominal surgeries. Pt denies fever, chest pain, shortness of breath, pnd, orthopnea, leg swelling, cough, hematuria. He reports compliance to plavix. Was advised by friend to go to er after visiting him today. Patient denies any other symptoms at this time. Admitted for further work up and stabilization \"    The intitial assessment and plan included:  \"    * (Principal) Cholecystitis 10/26/2022 Yes     #Sepsis secondary to acute cholecystis   #Moderate Acute Cholecystitis,   -concern for Gallstone pancreatitis  -alk phos 230, lipase 845, bilirubin 3.1, ,   -lactic 3.1, wbc 13.4 w/left shift, ,   -UA negative for bilirubin -->concern for blocked duct   -CT abdomen: Liver is hypodense. 15 mm lamellated gallstone noted. Gallbladder wall thickening noted. Nml appearing pancreas. Fat containing bilateral inguinal hernias  -2 cultures obtian in ER  -Empiric Abx, IVF, urine output, analgesia with nsaid caution given TAMARA, Protonix, US of RUQ if questionable can consider MRCP, NPO, direct bili, prepare for surgery if deemed candidate, he has known findings of large stones in past imaging. Cholestatic liver pattern and no bili in urine. MRCP may delay care. Monitor for gangrene, perforation, peritonitis, ARDS, septic shock.  -Consider early cholecystectomy or percutaneous cholecystomy . Type and screen,   -can narrow abx to metro and ceftriaxone once intial resuscitation and work up complete . Fu on cultures . Serial abdominal exams   -confirm 1 large bore IVS. Must have good Iv access before admission to floors.    -ID and surgery consult        #TAMARA on CKD3  -on admission Cr 1.7, bun 22, bicarb 23,   -Baseline cr 1.4,  BPH  -last echo EF 60-65% in 2021  -UA unrevealing   -NSAID with caution, IVF, bladder scans, au, reassess in morning     #CAD  -denied cp but had a cath in 6/22 for recurrent angina. Cath report:  1. Single vessel coronary artery disease. Patent stent in ostial LAD. Mild Nonobstructive CAD otherwise. LV gram not done due to chronic renal insufficiency. No stent placed, Imdur was added and risk factor modification   -troponin on admission 61-->46, with angelita on ckd  -hx of stent on plavix  -Plavix on hold for possible intervention []  -trend troponin and ekg  -Hold imdur incase hypotension due to shock.   -resume home metoprolol       #frequent falls  -hx of CVA, parkinson's, underlying parkinson's  -carotid US in 2019 without high grade stenosis  -fall precautions, neuro checks       #Lung nodule  -seen incidentally on ct abdomen: 10 mm spiculated nodule  right lung base. >8mm - immediate chest CT for further evaluation.  -Not seen on CT from 6/27/22  PLAN  FU on chest CT     #BPH  -bladder scans prn  -au placed for strict I and o\"     Database has included:  MR:  Impression:  Cholelithiasis with findings suggesting acute cholecystitis. No biliary or pancreatic ductal dilatation. No evidence of   choledocholithiasis. Mild hepatic steatosis. Past Medical History:   has a past medical history of 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 ([address] CKD (chronic kidney disease) stage 2, GFR 60-89 ml/min, CKD (chronic kidney disease) stage 3, GFR 30-59 ml/min ([facility]), Diverticulosis of colon, GERD (gastroesophageal reflux disease), History of non-ST elevation myocardial infarction (NSTEMI) 6/2022, Impaired renal function, MRSA (methicillin resistant staph aureus) culture positive, Osteoarthritis of knee, Parkinson disease ([address] Proteinuria, Skull fracture ([address] Temporary loss of eyesight, and Tubular adenoma of colon. Social History:   reports that he has quit smoking. He has never used smokeless tobacco. He reports that he does not drink alcohol and does not use drugs.      Family History:   Family History   Problem Relation Age of Onset    No Known Problems Mother     No Known Problems Father        Review of Systems:     Review of Systems   Constitutional:  Positive for appetite change (improving). Respiratory:  Negative for cough and shortness of breath. Cardiovascular:  Negative for chest pain and palpitations. Gastrointestinal:  Negative for abdominal pain, nausea (Resolved) and vomiting (Resolved). Genitourinary:  Negative for flank pain and hematuria. Neurological:  Positive for tremors (Known Parkinson's). Physical Examination:        Vitals  BP (!) 113/101   Pulse 61   Temp 98 °F (36.7 °C) (Oral)   Resp 18   Ht 5' 7\" (1.702 m)   Wt 183 lb 6.8 oz (83.2 kg)   SpO2 96%   BMI 28.73 kg/m²   Temp (24hrs), Av.6 °F (36.4 °C), Min:97.2 °F (36.2 °C), Max:98 °F (36.7 °C)    Recent Labs     10/25/22  2114 10/27/22  0744   POCGLU 167* 103       Physical Exam  Vitals reviewed. Constitutional:       General: He is not in acute distress. Appearance: He is not diaphoretic. HENT:      Head: Normocephalic. Nose: Nose normal.   Eyes:      General: No scleral icterus. Conjunctiva/sclera: Conjunctivae normal.   Neck:      Trachea: No tracheal deviation. Cardiovascular:      Rate and Rhythm: Normal rate and regular rhythm. Pulmonary:      Effort: Pulmonary effort is normal. No respiratory distress. Breath sounds: Normal breath sounds. No wheezing or rales. Chest:      Chest wall: No tenderness. Abdominal:      General: There is no distension. Palpations: Abdomen is soft. Tenderness: There is no abdominal tenderness. There is no guarding (Negative Sandoval sign). Musculoskeletal:         General: No tenderness. Cervical back: Neck supple. Skin:     General: Skin is warm and dry. Neurological:      Mental Status: He is alert. Comments: Resting tremor  Bradykinesia       Medications: Allergies:     Allergies   Allergen Reactions    Dye [Iodides]      pain    Aspirin      Brain bleed         Current Meds:   Scheduled Meds:    sodium chloride flush  5-40 mL IntraVENous 2 times per day    metoprolol tartrate  12.5 mg Oral BID    pantoprazole (PROTONIX) 40 mg injection  40 mg IntraVENous Daily    allopurinol  100 mg Oral Daily    amitriptyline  10 mg Oral Nightly    carbidopa-levodopa  1 tablet Oral BID    entacapone  200 mg Oral BID    gabapentin  100 mg Oral TID    rOPINIRole  0.25 mg Oral Nightly    magnesium oxide  400 mg Oral Daily    piperacillin-tazobactam  3,375 mg IntraVENous Q8H     Continuous Infusions:    sodium chloride      dextrose       PRN Meds: sodium chloride flush, sodium chloride, ondansetron **OR** ondansetron, acetaminophen **OR** acetaminophen, morphine, glucose, dextrose bolus **OR** dextrose bolus, glucagon (rDNA), dextrose, metoprolol    Data:     I/O (24Hr):     Intake/Output Summary (Last 24 hours) at 10/28/2022 1429  Last data filed at 10/28/2022 0524  Gross per 24 hour   Intake --   Output 2770 ml   Net -2770 ml       Labs:  Hematology:  Recent Labs     10/25/22  2126 10/26/22  0008 10/26/22  0206 10/27/22  0608 10/28/22  0351   WBC 13.4*  --   --  8.8 7.7   RBC 4.74  --   --  4.02* 4.27   HGB 15.1  --   --  13.0 13.3   HCT 45.0  --   --  38.4* 40.2*   MCV 94.9  --   --  95.5 94.1   MCH 31.9  --   --  32.3 31.1   MCHC 33.6  --   --  33.9 33.1   RDW 14.0  --   --  13.9 13.8     --   --  145 158   MPV 9.1  --   --  9.5 9.7   CRP  --  45.4*  --   --   --    INR  --  1.0 1.0  --   --      Chemistry:  Recent Labs     10/25/22  2126 10/26/22  0008 10/26/22  0634 10/26/22  1040 10/27/22  0608 10/28/22  0351     --   --  135 135 135   K 4.2  --   --  3.8 4.4 4.3   CL 97*  --   --  110* 104 101   CO2 24  --   --  18* 21 22   GLUCOSE 148*  --   --  90 103* 113*   BUN 22  --   --  17 21 15   CREATININE 1.71*  --   --  0.86 1.42* 1.35*   ANIONGAP 15  --   --  7* 10 12   LABGLOM 39*  --   --  >60 49* 52*   CALCIUM 9.4  --   --  6.6* 8.8 9.0   CAION  --   --   --   --   --  1.15   TROPHS 61* 46*  -- --   --   --    LACTACIDWB  --  3.1* 1.6  --   --   --      Recent Labs     10/25/22  2114 10/25/22  2126 10/25/22  2126 10/26/22  1837 10/27/22  0608 10/27/22  0744 10/28/22  0351   PROT  --  8.0   < > 6.5 6.2*  --  6.6   LABALBU  --  4.5   < > 3.4* 3.4*  --  3.3*   AST  --  427*   < > 123* 83*  --  48*   ALT  --  363*   < > 179* 70*  --  21   ALKPHOS  --  230*   < > 152* 143*  --  148*   BILITOT  --  3.1*   < > 3.1* 2.1*  --  1.3*   BILIDIR  --   --   --  2.3*  --   --   --    LIPASE  --  845*  --   --  16  --  15   POCGLU 167*  --   --   --   --  103  --     < > = values in this interval not displayed. ABG:No results found for: POCPH, PHART, PH, POCPCO2, IDZ6YIN, PCO2, POCPO2, PO2ART, PO2, POCHCO3, VPV8JHX, HCO3, NBEA, PBEA, BEART, BE, THGBART, THB, ATX6AXR, RKPZ0SSB, U2QDWIFE, O2SAT, FIO2  Lab Results   Component Value Date/Time    SPECIAL LEFT HAND 2.5ML 10/26/2022 12:07 AM     Lab Results   Component Value Date/Time    CULTURE NO GROWTH 2 DAYS 10/26/2022 12:07 AM       Radiology:  CT ABDOMEN PELVIS WO CONTRAST Additional Contrast? None    Result Date: 10/26/2022  Cholelithiasis and acute cholecystitis. Gallbladder ultrasound would be helpful for further characterization. 10 mm spiculated nodule right lung base. RECOMMENDATIONS: Guidelines for follow-up and management of pulmonary nodules found on abdomen CT: >8mm - immediate chest CT for further evaluation. Radiology 2017 http://pubs. rsna.org/doi/full/10.1148/radiol. 1316205152     XR CHEST (2 VW)    Result Date: 10/25/2022  No acute process. CT CHEST WO CONTRAST    Result Date: 10/26/2022  1. The spiculated 11 mm nodule described on the CT abdomen study demonstrates a more conspicuous central calcification, with multiple similar appearing areas of peribronchial nodules with calcifications seen in the right lower lobe, felt likely related to sequela of prior inflammatory/infectious process, and very unlikely to represent a neoplastic process.   No significant change from June of 2022. Given the degree of clinical concern of these findings, and follow-up recommendations as below, suggest repeat imaging in approximately 12 months. 2. Minimal dependent atelectasis. 3. Other incidental findings as above. RECOMMENDATIONS: Fleischner Society guidelines for follow-up and management of incidentally detected pulmonary nodules: Multiple Solid Nodules: Nodule size greater than 8 mm In a low-risk patient, CT at 3-6 months, then consider CT at 18-24 months. In a high-risk patient, CT at 3-6 months, then CT at 18-24 months. - Low risk patients include individuals with minimal or absent history of smoking and other known risk factors. - High risk patients include individuals with a history or smoking or known risk factors. Radiology 2017 http://pubs. rsna.org/doi/full/10.1148/radiol. 3928025987     MRI ABDOMEN WO CONTRAST MRCP    Result Date: 10/26/2022  Cholelithiasis with findings suggesting acute cholecystitis. No biliary or pancreatic ductal dilatation. No evidence of choledocholithiasis. Mild hepatic steatosis.        Assessment:        Primary Problem  Acute cholecystitis    Active Hospital Problems    Diagnosis Date Noted    [facility] [N17.9] 10/27/2022     Priority: Medium    History of non-ST elevation myocardial infarction (NSTEMI) 6/2022 [I25.2] 10/27/2022     Priority: Medium    Gallstone pancreatitis [K85.10] 10/26/2022     Priority: Medium    Calculus of gallbladder with acute cholecystitis without obstruction [K80.00] 10/26/2022     Priority: Medium    Bandemia [D72.825] 10/26/2022     Priority: Medium    Pulmonary nodule [R91.1] 10/26/2022     Priority: Medium    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] 10/26/2022     Priority: Medium    Acute cholecystitis [K81.0] 10/26/2022     Priority: Medium    Coronary artery disease involving native heart with angina pectoris ([facility] [I25.119]      Priority: Medium    Stage 3b chronic kidney disease (CKD) ([facility] [N18.32] 04/20/2022     Priority: Medium    Parkinson disease ([facility] Maya Slim 01/16/2020    Pulmonary hypertension, mild ([facility] [I27.20] 08/31/2017    Essential hypertension [I10] 03/23/2015    CKD (chronic kidney disease) stage 3, GFR 30-59 ml/min ([facility] [N18.30] 03/04/2014    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 Daisy Rosenthal   Blood Pressure - Monitor and control   Pulmonary nodule surveillance and follow-up outpatient  Sinemet  PT/OT  Neurology follow-up  [facility]: Parkinson's   DVT prophylaxis    IP CONSULT TO [facility] TO HOSPITALIST  IP CONSULT TO INFECTIOUS DISEASES  IP CONSULT TO DO Mary  10/28/2022  2:29 PM

## 2022-10-28 NOTE — CONSULTS
Attestation signed by      Attending Physician Statement:    I have discussed the care of  Dianna Rios , including pertinent history and exam findings, with the Cardiology fellow/resident. I have seen and examined the patient and the key elements of all parts of the encounter have been performed by me. I agree with the assessment, plan and orders as documented by the fellow/resident, after I modified exam findings and plan of treatments, and the final version is my approved version of the assessment. Additional Comments:   Patient was seen and examined. Cardiology was consulted for preoperative stratification prior to lap elissa. Troponins were initially elevated on admission due to acute kidney injury. Patient has a known history of coronary artery disease last cath on 6/22 revealed patent LAD stents. Denies any chest pains whatsoever. Therefore the patient can proceed at intermediate risk. Please resume antiplatelets as soon as okay with surgery. Follow-up with cardiology in 2 weeks. Jamia Oliver DO, [facility], [address], [address], [place] [address] [facility]  [facility] [employer]  [phone]     [facility]    Consult / H&P               Today's Date: 10/28/2022  Patient Name: Dianna Rios  Date of admission: 10/25/2022  8:54 PM  Patient's age: 79 y.o., 1940  Admission Dx: Cholecystitis [K81.9]  Gallstone pancreatitis [K85.10]    Reason for Consult:  Cardiac evaluation    Requesting Physician: No admitting provider for patient encounter. CHIEF COMPLAINT:  abd pain    History Obtained From:  patient, electronic medical record    HISTORY OF PRESENT ILLNESS:      The patient is a 79 y.o. M h/o single-vessel CAD w/ patent stent in ostial LAD per 6/2022 cath, otherwise mild nonobstructive cad, ckd baseline cr near 1.4, PD, prior stroke, lung nodule, bph admitted for abd pain found to have cholecystitis going for lap elissa 10/29 per .     Cardiology consulted for \"clearance. \":    -vss on ra  -trop 61-46  -cr 1.35  -lytes wnl  -ekg nsr incomplete rbbb-old  -last tte 9/2021-lvef 60-65%  -on bb/statin. Plavix held given upcoming planned surgery. Imdur also held as bp on lower side of normal  -current sx: no cp/sob. 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 Past Surgical History:   has a past surgical history that includes Colonoscopy (11/02/2012); shoulder surgery (Right); pr colsc flx w/rmvl of tumor polyp lesion snare tq (N/A, 9/19/2017); and Colonoscopy (09/19/2017). Home Medications:    Prior to Admission medications    Medication Sig Start Date End Date Taking?  Authorizing Provider   gabapentin (NEURONTIN) 100 MG capsule TAKE 1 CAPSULE BY MOUTH 3 TIMES DAILY FOR NERVES 9/22/22 10/22/22  Portia Romelavanda, APRN - CNP   pantoprazole (PROTONIX) 40 MG tablet TAKE 1 TABLET BY MOUTH ONCE DAILY FOR GERD 8/23/22   Portia Mylar, APRN - CNP   isosorbide mononitrate (IMDUR) 30 MG extended release tablet TAKE 1 TABLET BY MOUTH ONCE DAILY FOR HYPERTENSION 8/23/22   Portia Mylar, APRN - CNP   metoprolol tartrate (LOPRESSOR) 25 MG tablet TAKE ONE HALF (1/2) ATBLETS = 12.5MG BY MOUTH TWICE A DAY FOR HYPERTENSION 8/23/22   Portia Mylar, APRN - CNP   carbidopa-levodopa (SINEMET)  MG per tablet TAKE ONE (1) TABLET BY MOUTH FOUR (4) TIMES DAILY 8/9/22   Portia Jurado, APRN - CNP esomeprazole (NEXIUM) 20 MG delayed release capsule TAKE ONE (1) CAPSULE BY MOUTH ONCE DAILY IN THE MORNING BEFORE BREAKFAST 8/8/22   JW Escalante CNP   rOPINIRole (REQUIP) 0.25 MG tablet TAKE ONE (1) TABLET BY MOUTH THREE (3) TIMES DAILY 8/8/22   Dolores JW Medina CNP   atorvastatin (LIPITOR) 40 MG tablet take 1 tablet by mouth once daily 8/4/22   Dolores JW Medina CNP   entacapone (COMTAN) 200 MG tablet TAKE ONE (1) TABLET BY MOUTH FOUR (4) TIMES DAILY WITH SINEMET 6/21/22   Rosa Mcmullen MD   nitroGLYCERIN (NITROSTAT) 0.4 MG SL tablet up to max of 3 total doses. If no relief after 1 dose, call 911. 6/9/22   JW Lama NP   magnesium oxide (MAG-OX) 400 (240 Mg) MG tablet TAKE 1 TABLET BY MOUTH ONCE DAILY 5/23/22   JW Escalante CNP   diclofenac sodium (VOLTAREN) 1 % GEL Apply 2 g topically 2 times daily as needed for Pain (joint pain) 5/9/22   JW Llanos NP   amitriptyline (ELAVIL) 10 MG tablet Take 1 tablet by mouth nightly 4/5/22   Amber Gunter MD   clopidogrel (PLAVIX) 75 MG tablet TAKE 1 TABLET BY MOUTH ONCE DAILY  11/5/21   JW Levi CNP   allopurinol (ZYLOPRIM) 100 MG tablet TAKE 1 TABLET BY MOUTH ONCE DAILY  11/5/21   JW Lvei CNP   acetaminophen (TYLENOL 8 HOUR ARTHRITIS PAIN) 650 MG extended release tablet Take 1 tablet by mouth every 8 hours as needed for Pain 6/14/21   JW Escalante CNP   butalbital-acetaminophen-caffeine (FIORICET, ESGIC) -40 MG per tablet Take 1 tab prn only for severe headache. Can repeat after 4 hrs if needed. Max 2 tabs/24 hrs.  Max 4 tabs/week 6/14/21   JW Escalante CNP      Current Facility-Administered Medications: sodium chloride flush 0.9 % injection 5-40 mL, 5-40 mL, IntraVENous, 2 times per day  sodium chloride flush 0.9 % injection 10 mL, 10 mL, IntraVENous, PRN  0.9 % sodium chloride infusion, , IntraVENous, PRN  ondansetron (ZOFRAN-ODT) disintegrating tablet 4 mg, 4 mg, Oral, Q8H PRN **OR** ondansetron (ZOFRAN) injection 4 mg, 4 mg, IntraVENous, Q6H PRN  acetaminophen (TYLENOL) tablet 650 mg, 650 mg, Oral, Q6H PRN **OR** acetaminophen (TYLENOL) suppository 650 mg, 650 mg, Rectal, Q6H PRN  morphine injection 2 mg, 2 mg, IntraVENous, Q4H PRN  glucose chewable tablet 16 g, 4 tablet, Oral, PRN  dextrose bolus 10% 125 mL, 125 mL, IntraVENous, PRN **OR** dextrose bolus 10% 250 mL, 250 mL, IntraVENous, PRN  glucagon (rDNA) injection 1 mg, 1 mg, SubCUTAneous, PRN  dextrose 10 % infusion, , IntraVENous, Continuous PRN  metoprolol tartrate (LOPRESSOR) tablet 12.5 mg, 12.5 mg, Oral, BID  metoprolol (LOPRESSOR) injection 5 mg, 5 mg, IntraVENous, Q6H PRN  pantoprazole (PROTONIX) 40 mg in sodium chloride (PF) 0.9 % 10 mL injection, 40 mg, IntraVENous, Daily  allopurinol (ZYLOPRIM) tablet 100 mg, 100 mg, Oral, Daily  amitriptyline (ELAVIL) tablet 10 mg, 10 mg, Oral, Nightly  carbidopa-levodopa (SINEMET)  MG per tablet 1 tablet, 1 tablet, Oral, BID  entacapone (COMTAN) tablet 200 mg, 200 mg, Oral, BID  gabapentin (NEURONTIN) capsule 100 mg, 100 mg, Oral, TID  rOPINIRole (REQUIP) tablet 0.25 mg, 0.25 mg, Oral, Nightly  magnesium oxide (MAG-OX) tablet 400 mg, 400 mg, Oral, Daily  [COMPLETED] piperacillin-tazobactam (ZOSYN) 4,500 mg in dextrose 5 % 100 mL IVPB (mini-bag), 4,500 mg, IntraVENous, Once **AND** piperacillin-tazobactam (ZOSYN) 3,375 mg in dextrose 5 % 50 mL IVPB extended infusion (mini-bag), 3,375 mg, IntraVENous, Q8H    Allergies:  Dye [iodides] and Aspirin    Social History:   reports that he has quit smoking. He has never used smokeless tobacco. He reports that he does not drink alcohol and does not use drugs. Family History: family history includes No Known Problems in his father and mother. No h/o sudden cardiac death. unknown for premature CAD    REVIEW OF SYSTEMS:    Constitutional: there has been no unanticipated weight loss.  There's been No change in energy level, No change in activity level. Eyes: No visual changes or diplopia. No scleral icterus. ENT: No Headaches  Cardiovascular: per above  Respiratory: No previous pulmonary problems, No cough  Gastrointestinal: +abd pain; No change in bowel or bladder habits. Genitourinary: No dysuria, trouble voiding, or hematuria. Musculoskeletal:  No gait disturbance, No weakness or joint complaints. Integumentary: No rash or pruritis. Neurological: No headache, diplopia, change in muscle strength, numbness or tingling. No change in gait, balance, coordination, mood, affect, memory, mentation, behavior. Psychiatric: No anxiety, or depression. Endocrine: No temperature intolerance. No excessive thirst, fluid intake, or urination. No tremor. Hematologic/Lymphatic: No abnormal bruising or bleeding, blood clots or swollen lymph nodes. Allergic/Immunologic: No nasal congestion or hives. PHYSICAL EXAM:      /60   Pulse 61   Temp 97.2 °F (36.2 °C) (Oral)   Resp 16   Ht 5' 7\" (1.702 m)   Wt 183 lb 6.8 oz (83.2 kg)   SpO2 97%   BMI 28.73 kg/m²    Constitutional and General Appearance: alert, cooperative, no distress and appears stated age  HEENT: PERRL, no cervical lymphadenopathy. No masses palpable. Normal oral mucosa  Respiratory:  Normal excursion and expansion without use of accessory muscles  Resp Auscultation: Good respiratory effort. No for increased work of breathing. On auscultation: clear to auscultation bilaterally  Cardiovascular: The apical impulse is not displaced  Heart tones are crisp and normal. regular S1 and S2.  Jugular venous pulsation Normal  The carotid upstroke is normal in amplitude and contour without delay or bruit  Peripheral pulses are symmetrical and full   Abdomen:   Abd wall w/ diffuse ttp  Bowel sounds present  Extremities:   No Cyanosis or Clubbing   Lower extremity edema: No   Skin: Warm and dry  Neurological:  Alert and oriented.   Moves all extremities well  No abnormalities of mood, affect, memory, mentation, or behavior are noted    DATA:    Diagnostics:    EKG: normal sinus rhythm, incomplete RBBB, unchanged from previous tracings. ECHO: reviewed. Ejection fraction: 60-65%    Cardiac Angiography: reviewed. 6/2022:  \"Procedure Summary      1. Single vessel coronary artery disease. 2. Patent stent in ostial LAD   3. Mild Nonobstructive CAD otherwise   4. LV gram not done due to chronic renal insufficiency      Recommendations      Routine Post Diagnostic Cath orders. Medical therapy as needed. Add Imdur. Risk factor modification. Signature      ----------------------------------------------------------------   Electronically signed by Pao Wen(Performing   Physician) on 06/08/2022 19:09   ----------------------------------------------------------------      Angiographic Findings      Cardiac Arteries and Lesion Findings     LMCA: Normal 0% stenosis. LAD: Patent stent in ostial-proximal area with 10-20% in stent restenosis  Mid to distal vessel is small with diffuse 40% stenosis, somewhat improved  after IC nitro. LCx: Mild irregularities 10-20%. RCA: Mild irregularities 10-20%. Cardiac collaterals  +----------------+---------------------+-------------+---------------------+  ! Origin          ! Destination          ! Flow         ! Comments             ! +----------------+---------------------+-------------+---------------------+  ! R PDA           ! Dist LAD             !             !                     !  +----------------+---------------------+-------------+---------------------+      Coronary Tree      Dominance: Right\"    Labs:     CBC:   Recent Labs     10/27/22  0608 10/28/22  0351   WBC 8.8 7.7   HGB 13.0 13.3   HCT 38.4* 40.2*    158     BMP:   Recent Labs     10/27/22  0608 10/28/22  0351    135   K 4.4 4.3   CO2 21 22   BUN 21 15   CREATININE 1.42* 1.35*   LABGLOM 49* 52*   GLUCOSE 103* 113*     BNP: No results for input(s): BNP in the last 72 hours. PT/INR:   Recent Labs     10/26/22  0008 10/26/22  0206   PROTIME 10.7 10.8   INR 1.0 1.0     APTT:  Recent Labs     10/26/22  0008   APTT 23.9     CARDIAC ENZYMES:No results for input(s): CKTOTAL, CKMB, CKMBINDEX, TROPONINI in the last 72 hours. FASTING LIPID PANEL:  Lab Results   Component Value Date/Time    HDL 34 06/18/2022 06:01 AM    LDLCALC 58 01/08/2018 12:00 AM    TRIG 97 06/18/2022 06:01 AM     LIVER PROFILE:  Recent Labs     10/27/22  0608 10/28/22  0351   AST 83* 48*   ALT 70* 21   LABALBU 3.4* 3.3*       IMPRESSION:    Patient Active Problem List   Diagnosis    Temporary loss of eyesight    Syncope : posterior circulation stroke vs Neurocardiogenic syncope     Intracranial bleed : traumatic left sub-dural hematoma ,managed conservatively in 2011    Headache    CKD (chronic kidney disease) stage 3, GFR 30-59 ml/min ([facility])    Depression    Hyperlipidemia    Microhematuria    Microalbuminuria    Essential hypertension    Generalized abdominal pain    Tubular adenoma of colon    Diverticulosis of colon    History of skull fracture    Nausea    Pure hypercholesterolemia    Prediabetes    Parkinson disease ([facility])    Chronic post-traumatic headache, not intractable    Fall (on) (from) other stairs and steps, initial encounter    Strain of iliopsoas muscle, initial encounter    Chronic pain of left knee    Closed fracture of second toe of right foot    Closed fracture of second toe of left foot    Abnormal ECG    Cerebrovascular accident ([address]    Chest pain, unspecified    Hematoma of scalp    Left ventricular hypertrophy    Mild aortic valve regurgitation    Pulmonary hypertension, mild ([facility])    Lumbar radiculopathy    Dizziness    Hyponatremia    Vomiting    General weakness    Overweight (BMI 25.0-29. 9)    Frequent falls    Symptomatic bradycardia    Unspecified inflammatory spondylopathy, 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[address]    Stage 3b chronic kidney disease (CKD) ([facility])    Chronic pain of multiple joints    Multiple comorbid conditions    Gout    Nerve pain    NSTEMI (non-ST elevated myocardial infarction) ([facility]    History of subdural hematoma    Coronary artery disease involving native heart with angina pectoris (HCC)    Lumbar facet arthropathy    Spinal stenosis of lumbar region without neurogenic claudication    Generalized weakness    Lumbosacral spondylosis without myelopathy    Gallstone pancreatitis    Calculus of gallbladder with acute cholecystitis without obstruction    Bandemia    Pulmonary nodule    MRSA carrier    Acute cholecystitis    TAMARA (acute kidney injury) ([facility]    History of non-ST elevation myocardial infarction (NSTEMI) 6/2022   -single-vessel CAD w/ patent stent per 6/2022 otherwise global mild nonobstructive cad-currently asymptomatic from cardiac standpoint-no cp/sob  -normal lvef per 2021 tte  -tamara on [id number] now within baseline range  -troponemia-likely demand 2/2 tamara on ckd and acute cholecystitis  -acute cholecystitis-plan lap elissa 10/29    RECOMMENDATIONS:  Intermediate cardiac risk for intermediate-risk surgery  The revised cardiovascular risk index is ?2 and is associated with a >11 percent risk of major cardiovascular events. Can proceed w/ surgery w/ above risk  Continue BB perioperatively as tolerated by hemodynamics  Continue statin perioperatively  Resume plavix post-op when ok per Gen Surg  Resume Imdur post-op as hemodynamics allow    D/w Dr. Amandeep Ron on rounds.      Melony Thao MD  PGY-3, Department of Internal Medicine  [facility], [place], [place]

## 2022-10-28 NOTE — PROGRESS NOTES
General Surgery:  Daily Progress Note                    PATIENT NAME: Harpreet Richards     TODAY'S DATE: 10/28/2022, 5:24 AM  CC:  abdominal pain    SUBJECTIVE:     Pt seen and examined at bedside. Tmax 97.7. Pt states that he slept well last night but woke up around 0400 with abdominal pain. Pain is located around the umbilicus. Denies fever, chills, nausea, vomiting and SOB. Tolerating diet of clear liquids well. Activity: pt did not ambulate yesterday. Pt reports no BM yesterday. OBJECTIVE:   VITALS:  /60   Pulse 61   Temp 97.2 °F (36.2 °C) (Oral)   Resp 16   Ht 5' 7\" (1.702 m)   Wt 185 lb 3 oz (84 kg)   SpO2 97%   BMI 29.00 kg/m²      INTAKE/OUTPUT:      Intake/Output Summary (Last 24 hours) at 10/28/2022 0524  Last data filed at 10/28/2022 4888  Gross per 24 hour   Intake --   Output 3070 ml   Net -3070 ml       PHYSICAL EXAM:  General Appearance: well developed, well nourished, in no acute distress, and alert  HEENT:  Normocephalic, atraumatic, mucus membranes moist   Heart: Heart sounds are normal.  Regular rate and rhythm without murmur, gallop or rub. Lungs: Normal expansion. Clear to auscultation. No rales, rhonchi, or wheezing. Abdomen: Bowel sounds present; soft and non-distended; diffusely tender to palpation  Extremities: No cyanosis, pitting edema, rashes noted. Skin: Skin color, texture, turgor normal. No rashes or lesions.     IV Access:  PIV left forearm      Data:   Latest Reference Range & Units 10/28/22 03:51   Sodium 135 - 144 mmol/L 135   Potassium 3.7 - 5.3 mmol/L 4.3   Chloride 98 - 107 mmol/L 101   CO2 20 - 31 mmol/L 22   BUN,BUNPL 8 - 23 mg/dL 15   Creatinine 0.70 - 1.20 mg/dL 1.35 (H)   Anion Gap 9 - 17 mmol/L 12   Calcium, Ionized 1.13 - 1.33 mmol/L 1.15   Est, Glom Filt Rate >60 mL/min/1.73m2 52 (L)   Glucose, Random 70 - 99 mg/dL 113 (H)   CALCIUM, SERUM, 015365 8.6 - 10.4 mg/dL 9.0   ALBUMIN/GLOBULIN RATIO 1.0 - 2.5  1.0   Total Protein 6.4 - 8.3 g/dL 6.6   Albumin 3.5 - 5.2 g/dL 3.3 (L)   Alk Phos 40 - 129 U/L 148 (H)   ALT 5 - 41 U/L 21   AST <40 U/L 48 (H)   Bilirubin 0.3 - 1.2 mg/dL 1.3 (H)   Lipase 13 - 60 U/L 15   WBC 3.5 - 11.3 k/uL 7.7   RBC 4.21 - 5.77 m/uL 4.27   Hemoglobin Quant 13.0 - 17.0 g/dL 13.3   Hematocrit 40.7 - 50.3 % 40.2 (L)   MCV 82.6 - 102.9 fL 94.1   MCH 25.2 - 33.5 pg 31.1   MCHC 28.4 - 34.8 g/dL 33.1   MPV 8.1 - 13.5 fL 9.7   RDW 11.8 - 14.4 % 13.8   Platelet Count 890 - 453 k/uL 158   Absolute Mono # 0.10 - 1.20 k/uL 0.45   Eosinophils % 1 - 4 % 3   Basophils Absolute 0.00 - 0.20 k/uL <0.03   Seg Neutrophils 36 - 65 % 80 (H)   Segs Absolute 1.50 - 8.10 k/uL 6.25   Lymphocytes 24 - 43 % 10 (L)   Absolute Lymph # 1.10 - 3.70 k/uL 0.73 (L)   Monocytes 3 - 12 % 6   Absolute Eos # 0.00 - 0.44 k/uL 0.22   Basophils 0 - 2 % 0   Immature Granulocytes 0 % 1 (H)   Absolute Immature Granulocyte 0.00 - 0.30 k/uL 0.05   NRBC Automated 0.0 per 100 WBC 0.0   (H): Data is abnormally high  (L): Data is abnormally low        Radiology Review:  no new imaging    ASSESSMENT:  Active Hospital Problems    Diagnosis Date Noted    TAMARA (acute kidney injury) ([facility] [N17.9] 10/27/2022     Priority: Medium    History of non-ST elevation myocardial infarction (NSTEMI) 6/2022 [I25.2] 10/27/2022     Priority: Medium    Gallstone pancreatitis [K85.10] 10/26/2022     Priority: Medium    Calculus of gallbladder with acute cholecystitis without obstruction [K80.00] 10/26/2022     Priority: Medium    Bandemia [D72.825] 10/26/2022     Priority: Medium    Pulmonary nodule [R91.1] 10/26/2022     Priority: Medium    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] 10/26/2022     Priority: Medium    Acute cholecystitis [K81.0] 10/26/2022     Priority: Medium    Coronary artery disease involving native heart with angina pectoris ([facility] [I25.119]      Priority: Medium    Stage 3b chronic kidney disease (CKD) ([facility] [N18.32] 04/20/2022     Priority: Medium    Parkinson disease ([facility] Mason Saavedra 01/16/2020 Pulmonary hypertension, mild (HCC) [I27.20] 08/31/2017    Essential hypertension [I10] 03/23/2015    CKD (chronic kidney disease) stage 3, GFR 30-59 ml/min ([facility] [N18.30] 03/04/2014    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 male with abdominal pain and vomiting. MRCP significant for cholecystitis no choledocholithiasis. Improving LFTs and lipase. Plan:  Diet: CLD, continue today, NPO midnight for OR  Analgesia:  morphine and tylenol  GI prophylaxis: pantoprazole  DVT prophylaxis:  IPC  Activity:  Continue to encourage ambulation/activity w/ PT/OT  Continue to encourage incentive spirometry  ID: [email or web address]  Labs: continue to trend alk phos, bilirubin, lipase and ALT/AST   Plan for lap cholecystectomy on 10/29    Electronically signed by Stacy Martinez  on 10/28/2022 at 5:24 AM       General Surgery Resident Statement/Addendum  I have discussed the case, including pertinent history and exam findings with the above medical student. I have personally seen the patient. Note was edited and changes made by me. Assessment and plan reviewed and changes made by me. I agree with the assessment and plan as stated above.     Loretta Stephens, DO PGY-1  10/28/22 6:46 AM

## 2022-10-28 NOTE — PROGRESS NOTES
Occupational Therapy  Facility/Department: [facility]  Occupational Therapy Daily Treatment Note    Name: Amber Villatoro  : 1940  MRN: [id number]  Date of Service: 10/28/2022    Discharge Recommendations:  Patient would benefit from continued therapy after discharge    Patient Diagnosis(es): The encounter diagnosis was Gallstone pancreatitis. 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 Past Surgical History:  has a past surgical history that includes Colonoscopy (2012); shoulder surgery (Right); pr colsc flx w/rmvl of tumor polyp lesion snare tq (N/A, 2017); and Colonoscopy (2017). Assessment   Performance deficits / Impairments: Decreased functional mobility ; Decreased ADL status; Decreased endurance;Decreased high-level IADLs;Decreased safe awareness;Decreased balance;Decreased cognition  Assessment: Patient participated in OT session focused on bed mobility, standing and ADL tasks. Pt continues to require assist for LB tasks, bed mobility and standing.   Prognosis: Good  Activity Tolerance  Activity Tolerance: Patient Tolerated treatment well  Activity Tolerance Comments: Occasional rest breaks required        Plan   Occupational Therapy Plan  Times Per Week: 3-4x/wk  Current Treatment Recommendations: Balance training, Functional mobility training, Endurance training, Safety education & training, Patient/Caregiver education & training, Equipment evaluation, education, & procurement, Return to work related activity, Self-Care / ADL, Home management training     Restrictions  Restrictions/Precautions  Restrictions/Precautions: Fall Risk, Contact Precautions, Up as Tolerated, General Precautions  Required Braces or Orthoses?: No  Position Activity Restriction  Other position/activity restrictions: up with assist; Hx of parkinsons    Subjective   General  Patient assessed for rehabilitation services?: Yes  Response to previous treatment: Patient with no complaints from previous session  Family / Caregiver Present: Yes (Friend present)  General Comment  Comments: RN  ok'd for OT treatment this afternoon. Pt supine in bed upon [facility] arrival. Pt agreeable to session and denied any pain. Objective   Safety Devices  Type of Devices: Bed alarm in place;Call light within reach;Gait belt;Left in bed  Restraints  Restraints Initially in Place: No  Bed Mobility Training  Bed Mobility Training: Yes  Overall Level of Assistance: Additional time;Minimum assistance  Interventions: Verbal cues  Supine to Sit: Minimum assistance  Sit to Supine: Minimum assistance  Scooting: Minimum assistance  Balance  Sitting: Without support (Seated EOB ~20 minutes SBA)  Standing: With support (Stood ~1-2 minutes CGA with RW)  Gait Training  Gait Training: Yes  Gait  Overall Level of Assistance: Minimum assistance;Contact-guard assistance  Interventions: Verbal cues; Safety awareness training  Speed/Ewa: Slow;Shuffled  Distance (ft):  (Stood only)  Assistive Device: Walker, rolling        ADL  Grooming: Modified independent ;Setup; Increased time to complete  Grooming Skilled Clinical Factors: Wash face and hands  UE Bathing: Stand by assistance;Setup; Increased time to complete  LE Bathing: Minimal assistance;Verbal cueing;Setup; Increased time to complete  LE Bathing Skilled Clinical Factors: Foot level care  UE Dressing: Stand by assistance; Increased time to complete  LE Dressing: Minimal assistance;Verbal cueing;Setup; Increased time to complete  Additional Comments: Pt completed while seated EOB with no LOB and extra time required     Activity Tolerance  Activity Tolerance: Patient tolerated evaluation without incident  Bed mobility  Supine to Sit: Minimal assistance  Sit to Supine: Contact guard assistance  Scooting: Contact guard assistance  Bed Mobility Comments: Increased time/effort; HOB 30 and bedrail use  Transfers  Sit to stand: Contact guard assistance  Stand to sit: Contact guard assistance     Cognition  Overall Cognitive Status: Exceptions  Arousal/Alertness: Appropriate responses to stimuli  Following Commands: Follows one step commands consistently; Follows multistep commands with increased time; Follows multistep commands with repitition  Attention Span: Attends with cues to redirect  Safety Judgement: Decreased awareness of need for assistance  Problem Solving: Assistance required to generate solutions  Insights: Decreased awareness of deficits  Initiation: Requires cues for some  Sequencing: Requires cues for some  Orientation  Overall Orientation Status: Within Normal Limits  Orientation Level: Oriented X4     Education Given To: Patient  Education Provided: Role of Therapy;Transfer Training;ADL Adaptive Strategies  Education Provided Comments: Safety and body mechanics  Education Method: Demonstration;Verbal  Barriers to Learning: Cognition  Education Outcome: Verbalized understanding;Demonstrated understanding;Continued education needed    AM-PAC Score  AM-[facility] Inpatient Daily Activity Raw Score: 20 (10/28/22 1615)  AM-PAC Inpatient ADL T-Scale Score : 42.03 (10/28/22 1615)  ADL Inpatient CMS 0-100% Score: 38.32 (10/28/22 1615)  ADL Inpatient CMS G-Code Modifier : Chidipriya Granadosanibal (10/28/22 1615)      Goals  Short Term Goals  Time Frame for Short Term Goals: By discharge, pt will:  Short Term Goal 1: Demo fucntional sit<>stand transfers and functional mobility with SBA and LRD PRN  Short Term Goal 2: Demo 10 minutes of dynamic standing balance with SBA to promote increased engagement in ADLS  Short Term Goal 3: Demo UB ADLs with Mod IND  Short Term Goal 4: Demo LB ADLs/toileting with SBA and DME PRN  Short Term Goal 5: Demo 25 minutes of functional activity tolerance to promote icnreased engagement in ADLs       Therapy Time   Individual Concurrent Group Co-treatment   Time In 1528         Time Out 1608         Minutes 40         Timed Code Treatment Minutes: Patric[place]ury, [facility]/L

## 2022-10-28 NOTE — PROGRESS NOTES
[facility] - Progress Note    Today's Date and Time: 10/28/2022, 10:34 AM    Impression :   Acute cholecystitis  No biliary or pancreatic ductal dilation or choledocholithiasis on MRCP  Cholelithiasis  Pulmonary nodule  Transaminitis  Leukocytosis  TAMARA-resolved  Hx Parkinson's  Hx MRSA on leg wound 2015    Recommendations:   Continue IV Zosyn for cholecystitis   Closely monitor patient's progress  Follow-up on culture results    Medical Decision Making/Summary/Discussion:10/28/2022     Patient with acute cholelithiasis and cholecystitis  MRCP completed with no biliary or pancreatic ductal dilation or choledocholithiasis. Infection Control Recommendations   Dixon Precautions    Antimicrobial Stewardship Recommendations   Discontinuation of therapy    Coordination of Outpatient Care:   Estimated Length of IV antimicrobials:TBD  Patient will need Midline Catheter Insertion: TBD  Patient will need PICC line Insertion:BD  Patient will need: Home IV , [place],  SNF,  LTAC: TBD  Patient will need outpatient wound care:    Chief complaint/reason for consultation:   Antibiotic recommendations for acute cholecystitis    History of Present Illness:   Janina Wisdom is a 79y.o.-year-old male who was initially admitted on 10/25/2022. Patient seen at the request of Dr. Vladislav Byrnes. INITIAL HISTORY:    This patient presented to the ED on 10/25/22 with complaints of generalized weakness as well as abdominal pain and 3-4 episodes of emesis over the past couple days. Lab work revealed elevated AST/ALT, bilirubin, lipase and alk phos. A CT abdomen revealed acute cholecystitis with cholelithiasis. A CT chest was also completed that showed a 10 mm spiculated nodule in the right lung base. General surgery was consulted and an MRCP was ordered.    MRCP findings include cholelithiasis with acute cholecystitis  No biliary or pancreatic ductal dilation and no evidence of choledocholithiasis. The patient received one dose of Zosyn. On 10/26/22    IMAGING:  CT ABDOMEN PELVIS WO CONTRAST Additional Contrast? None     Result Date: 10/26/2022  Cholelithiasis and acute cholecystitis. Gallbladder ultrasound would be helpful for further characterization. 10 mm spiculated nodule right lung base. RECOMMENDATIONS: Guidelines for follow-up and management of pulmonary nodules found on abdomen CT: >8mm - immediate chest CT for further evaluation. Radiology 2017 http://pubs. rsna.org/doi/full/10.1148/radiol. 8193130362      XR CHEST (2 VW)     Result Date: 10/25/2022  No acute process. MRCP 10/26/22  Impression   Cholelithiasis with findings suggesting acute cholecystitis. No biliary or pancreatic ductal dilatation. No evidence of   choledocholithiasis. Mild hepatic steatosis. CURRENT EVALUATION 10/28/2022  BP (!) 99/50   Pulse 57   Temp 98 °F (36.7 °C) (Oral)   Resp 18   Ht 5' 7\" (1.702 m)   Wt 183 lb 6.8 oz (83.2 kg)   SpO2 94%   BMI 28.73 kg/m²     Afebrile  VS stable on room air    The patient is alert and oriented. He has continued to experience some abdominal discomfort. No acute events overnight    Currently on NPO with small sips of water. General surgery planning on advancing to [facility]D today planning on  laparoscopic cholecystectomy on 10/29/22. He had a bowel movement last evening    Zosyn continues. Tolerating well. AST, ALT, ALP, Bilirubin, and Lipase are trending downwards    Labs, X rays reviewed: 10/28/2022    BUN: 22-->21-->15  Cr: 1.71-->1.42-->1.35    WBC: 13.4-->8.8-->7.7  Hb: 15.1-->13-->13.3  Plat: 204-->145-->158    ALT: 363-->70-->21  AST: 427-->83-->48  Alk phos: 230-->143-->148  Bili: 3.1-->2.1-->1. 3  Lipase: 845-->16-->15    CRP: 45.4    Cultures:  Urine:  10/25/22: No significant growth  Blood:  10/26/22: No growth  Sputum :    Wound:    MRSA Nares:    Imaging:  CT Chest 10/25/22      CT abd/pelvis 10/25/22      Discussed with patient, RN, family. I have personally reviewed the past medical history, past surgical history, medications, social history, and family history, and I have updated the database accordingly.   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 [address] 3/18/2011    d/t being hurt on the job    CKD (chronic kidney disease) stage 2, GFR 60-89 ml/min     CKD (chronic kidney disease) stage 3, GFR 30-59 ml/min (formerly [facility])     Diverticulosis of colon     GERD (gastroesophageal reflux disease)     History of non-ST elevation myocardial infarction (NSTEMI) 6/2022 10/27/2022    Impaired renal function     MRSA (methicillin resistant staph aureus) culture positive 9/23/2015    leg    Osteoarthritis of knee     Left    Parkinson disease ([facility] [address]     Proteinuria     Skull fracture ([facility] [address] 3/18/2011    d/t 12-foot drop on the job    Temporary loss of eyesight     periodically, happened x7    Tubular adenoma of colon 2012, 2017    [facility]     Past Surgical  History:     Past Surgical History:   Procedure Laterality Date    COLONOSCOPY  11/02/2012    poor prep, tubular adenoma    COLONOSCOPY  09/19/2017    diverticulosis, multiple polyps as described, spot marking done, pathology-tubular adenoma x 4    [place] COLSC FLX W/RMVL OF TUMOR POLYP LESION SNARE TQ N/A 9/19/2017    COLONOSCOPY POLYPECTOMY SNARE/COLD BIOPSY with tatooing and apc transverse colon performed by Orestes Mcnulty MD at [id number] [place] [address] Right     rotator cuff     Medications:      sodium chloride flush  5-40 mL IntraVENous 2 times per day    metoprolol tartrate  12.5 mg Oral BID    pantoprazole (PROTONIX) 40 mg injection  40 mg IntraVENous Daily    allopurinol  100 mg Oral Daily    amitriptyline  10 mg Oral Nightly    carbidopa-levodopa  1 tablet Oral BID    entacapone  200 mg Oral BID    gabapentin  100 mg Oral TID    rOPINIRole  0.25 mg Oral Nightly    magnesium oxide  400 mg Oral Daily piperacillin-tazobactam  3,375 mg IntraVENous Q8H     Social History:     Social History     Socioeconomic History    Marital status: Single     Spouse name: Not on file    Number of children: Not on file    Years of education: Not on file    Highest education level: Not on file   Occupational History    Not on file   Tobacco Use    Smoking status: Former    Smokeless tobacco: Never   Vaping Use    Vaping Use: Never used   Substance and Sexual Activity    Alcohol use: No    Drug use: No    Sexual activity: Not Currently   Other Topics Concern    Not on file   Social History Narrative    Not on file     Social Determinants of Health     Financial Resource Strain: Low Risk     Difficulty of Paying Living Expenses: Not hard at all   Food Insecurity: No Food Insecurity    Worried About Running Out of Food in the Last Year: Never true    [address] in the Last Year: Never true   Transportation Needs: No Transportation Needs    Lack of Transportation (Medical): No    Lack of Transportation (Non-Medical): No   Physical Activity: Inactive    Days of Exercise per Week: 0 days    Minutes of Exercise per Session: 0 min   Stress: Stress Concern Present    Feeling of Stress : To some extent   Social Connections: Socially Isolated    Frequency of Communication with Friends and Family: Three times a week    Frequency of Social Gatherings with Friends and Family:  Three times a week    Attends Sikh Services: Never    Active Member of Clubs or Organizations: No    Attends Club or Organization Meetings: Never    Marital Status:    Intimate Partner Violence: Not At Risk    Fear of Current or Ex-Partner: No    Emotionally Abused: No    Physically Abused: No    Sexually Abused: No   Housing Stability: Low Risk     Unable to Pay for Housing in the Last Year: No    Number of Places Lived in the Last Year: 1    Unstable Housing in the Last Year: No     Family History:     Family History   Problem Relation Age of Onset No Known Problems Mother     No Known Problems Father       Allergies:   Dye [iodides] and Aspirin     Review of Systems:   Constitutional: No fevers or chills. No systemic complaints  Head: No headaches  Eyes: No double vision or blurry vision. No conjunctival inflammation. ENT: No sore throat or runny nose. . No hearing loss, tinnitus or vertigo. Cardiovascular: No chest pain or palpitations. No shortness of breath. No METZ  Lung: No shortness of breath or cough. No sputum production  Abdomen: No nausea, vomiting, diarrhea, or abdominal pain. Pink Jr No cramps. Genitourinary: No increased urinary frequency, or dysuria. No hematuria. No suprapubic or CVA pain  Musculoskeletal: No muscle aches or pains. No joint effusions, swelling or deformities  Hematologic: No bleeding or bruising. Neurologic: No headache, weakness, numbness, or tingling. Integument: No rash, no ulcers. Psychiatric: No depression. Endocrine: No polyuria, no polydipsia, no polyphagia. Physical Examination :   Patient Vitals for the past 8 hrs:   BP Temp Temp src Pulse Resp SpO2 Weight   10/28/22 0754 (!) 99/50 98 °F (36.7 °C) Oral 57 18 94 % --   10/28/22 0630 -- -- -- -- -- -- 183 lb 6.8 oz (83.2 kg)   General Appearance: Awake, alert, and in no apparent distress  Head:  Normocephalic, no trauma  Eyes: Pupils equal, round, reactive to light and accommodation; extraocular movements intact; sclera anicteric; conjunctivae pink. No embolic phenomena. ENT: Oropharynx clear, without erythema, exudate, or thrush. No tenderness of sinuses. Mouth/throat: mucosa pink and moist. No lesions. Dentition in good repair. Neck:Supple, without lymphadenopathy. Thyroid normal, No bruits. Pulmonary/Chest: Clear to auscultation, without wheezes, rales, or rhonchi. No dullness to percussion. Cardiovascular: Regular rate and rhythm without murmurs, rubs, or gallops. Abdomen: Soft, non tender.  Bowel sounds normal. No organomegaly  All four Extremities: No cyanosis, clubbing, edema, or effusions. Neurologic: No gross sensory or motor deficits. Skin: Warm and dry with good turgor. No signs of peripheral arterial or venous insufficiency. No ulcerations. No open wounds. Medical Decision Making -Laboratory:   I have independently reviewed/ordered the following labs:    CBC with Differential:   Recent Labs     10/27/22  0608 10/28/22  0351   WBC 8.8 7.7   HGB 13.0 13.3   HCT 38.4* 40.2*    158   LYMPHOPCT 11* 10*   MONOPCT 9 6   BMP:   Recent Labs     10/27/22  0608 10/28/22  0351    135   K 4.4 4.3    101   CO2 21 22   BUN 21 15   CREATININE 1.42* 1.35*   Hepatic Function Panel:   Recent Labs     10/26/22  1837 10/27/22  0608 10/28/22  0351   PROT 6.5 6.2* 6.6   LABALBU 3.4* 3.4* 3.3*   BILIDIR 2.3*  --   --    IBILI 0.8  --   --    BILITOT 3.1* 2.1* 1.3*   ALKPHOS 152* 143* 148*   * 70* 21   * 83* 48*   No results for input(s): RPR in the last 72 hours. No results for input(s): HIV in the last 72 hours. No results for input(s): BC in the last 72 hours. Lab Results   Component Value Date/Time    MUCUS NOT REPORTED 02/16/2022 12:09 PM    RBC 4.27 10/28/2022 03:51 AM    RBC 5.14 02/24/2012 11:00 AM    TRICHOMONAS NOT REPORTED 02/16/2022 12:09 PM    WBC 7.7 10/28/2022 03:51 AM    YEAST NOT REPORTED 02/16/2022 12:09 PM    TURBIDITY Clear 10/25/2022 10:25 PM     Lab Results   Component Value Date/Time    CREATININE 1.35 10/28/2022 03:51 AM    GLUCOSE 113 10/28/2022 03:51 AM    GLUCOSE 97 04/24/2012 12:55 PM     Medical Decision Making-Imaging:     Narrative   EXAMINATION:   CT OF THE CHEST WITHOUT CONTRAST 10/26/2022 1:29 am       TECHNIQUE:   CT of the chest was performed without the administration of intravenous   contrast. Multiplanar reformatted images are provided for review.  Automated   exposure control, iterative reconstruction, and/or weight based adjustment of   the mA/kV was utilized to reduce the radiation dose to as low as reasonably   achievable. COMPARISON:   CT abdomen and pelvis the previous day, CT chest on 06/27/2022       HISTORY:   ORDERING SYSTEM PROVIDED HISTORY: eval mass   TECHNOLOGIST PROVIDED HISTORY:   eval mass   Decision Support Exception - unselect if not a suspected or confirmed   emergency medical condition->Emergency Medical Condition (MA)       FINDINGS:   Mediastinum: No cardiomegaly is identified. No focal esophageal thickening   is seen. Small hiatal hernia. No mediastinal lymphadenopathy is identified. Calcified lymph nodes are noted. No thyroid mass. No pericardial effusion. Coronary artery stent is noted. Lungs/pleura: Respiratory motion artifact, however there are numerous areas   of mild peribronchial nodularity seen within the right lower lobe, most of   which demonstrate small punctate benign-appearing calcifications, and   findings felt likely related to post inflammatory/infectious changes. The   described spiculated nodule on the comparison CT abdomen demonstrates a   calcification which is more conspicuous on this study, and felt very unlikely   to represent a neoplastic process. Upper Abdomen: Cholelithiasis with potential cholecystitis noted, partially   imaged as previously discussed. Soft Tissues/Bones: No axillary or supraclavicular lymphadenopathy is   identified. No acute osseous abnormality. No acute vertebral body fracture   is identified. No osseous destructive process is identified. Impression   1. The spiculated 11 mm nodule described on the CT abdomen study demonstrates   a more conspicuous central calcification, with multiple similar appearing   areas of peribronchial nodules with calcifications seen in the right lower   lobe, felt likely related to sequela of prior inflammatory/infectious   process, and very unlikely to represent a neoplastic process. No significant   change from June of 2022.   Given the degree of clinical concern of these   findings, and follow-up recommendations as below, suggest repeat imaging in   approximately 12 months. 2. Minimal dependent atelectasis. 3. Other incidental findings as above. RECOMMENDATIONS:   Fleischner Society guidelines for follow-up and management of incidentally   detected pulmonary nodules:       Multiple Solid Nodules:       Nodule size greater than 8 mm   In a low-risk patient, CT at 3-6 months, then consider CT at 18-24 months. In a high-risk patient, CT at 3-6 months, then CT at 18-24 months. - Low risk patients include individuals with minimal or absent history of   smoking and other known risk factors. - High risk patients include individuals with a history or smoking or known   risk factors. Radiology 2017 http://pubs. rsna.org/doi/full/10.1148/radiol. 1216853353         Narrative   EXAMINATION:   CT OF THE ABDOMEN AND PELVIS WITHOUT CONTRAST 10/25/2022 11:38 pm       TECHNIQUE:   CT of the abdomen and pelvis was performed without the administration of   intravenous contrast. Multiplanar reformatted images are provided for review. Automated exposure control, iterative reconstruction, and/or weight based   adjustment of the mA/kV was utilized to reduce the radiation dose to as low   as reasonably achievable. COMPARISON:   None. HISTORY:   ORDERING SYSTEM PROVIDED HISTORY: eval for gallstone pancreatitis   TECHNOLOGIST PROVIDED HISTORY:   eval for gallstone pancreatitis       Decision Support Exception - unselect if not a suspected or confirmed   emergency medical condition->Emergency Medical Condition (MA)   Reason for Exam: eval for gallstone pancreatitis       FINDINGS:   Lower Chest: Lad stent/coronary artery disease. 10 mm spiculated nodule   right lung base. Organs: Liver is hypodense. 15 mm lamellated gallstone noted. Gallbladder   wall thickening noted. Adrenals and pancreas normal.       GI/Bowel: Small hiatal hernia.   Small large bowel normal.  Appendix normal.       Pelvis: Fat containing bilateral inguinal hernias. Peritoneum/Retroperitoneum: Calcified plaque along the aorta and its branches. Bones/Soft Tissues: Spondylosis and multilevel vacuum disc phenomena. Slight   anterior subluxation L4-5. Impression   Cholelithiasis and acute cholecystitis. Gallbladder ultrasound would be   helpful for further characterization. 10 mm spiculated nodule right lung base. RECOMMENDATIONS:   Guidelines for follow-up and management of pulmonary nodules found on abdomen   CT:       >8mm - immediate chest CT for further evaluation. Radiology 2017 http://pubs. rsna.org/doi/full/10.1148/radiol. [id number]     Narrative   EXAMINATION:   MRI OF THE ABDOMEN WITHOUT CONTRAST AND MRCP 10/26/2022 9:47 am       TECHNIQUE:   Multiplanar multisequence MRI of the abdomen was performed without the   administration of intravenous contrast.  After initial T2 axial and coronal   images, thick slab, thin slab and 3D coronal MRCP sequences were obtained   without the administration of intravenous contrast.  MIP images are provided   for review. COMPARISON:   10/25/2022 CT abdomen/pelvis       HISTORY:   ORDERING SYSTEM PROVIDED HISTORY: cholelithiasis elevated lft's   ,?choledocholithiasis   TECHNOLOGIST PROVIDED HISTORY:   cholelithiasis elevated lft's ,?choledocholithiasis   What is the sedation requirement?->None       FINDINGS:   Gallbladder: There is cholelithiasis with a large stone measuring   approximately 19 mm in size. There is gallbladder wall thickening measuring   approximately 7 mm in thickness. There is also pericholecystic fluid. Findings can be seen with acute cholecystitis in the proper clinical setting. Bile Ducts: There is no evidence of common bile duct dilatation. Common bile   duct measures approximately 4 mm in diameter. No intrahepatic or   extrahepatic biliary ductal dilatation.   No evidence of choledocholithiasis. No evidence of common bile duct stricture or obstruction. Pancreatic Duct: No pancreatic ductal dilatation or obstruction. No   pancreatic ductal stones. No evidence of pancreatic divisum. Other:  Examination not tailored for the evaluation of the solid abdominal   organs. There is mild diffuse hepatic steatosis. No evidence of   hydronephrosis. No evidence of abdominal ascites or lymphadenopathy. Visualized bowel is grossly unremarkable. Osseous structures demonstrate no   acute abnormality. Impression   Cholelithiasis with findings suggesting acute cholecystitis. No biliary or pancreatic ductal dilatation. No evidence of   choledocholithiasis. Mild hepatic steatosis. Narrative   EXAMINATION:   TWO XRAY VIEWS OF THE CHEST       10/25/2022 9:44 pm       COMPARISON:   July 19, 2022       HISTORY:   ORDERING SYSTEM PROVIDED HISTORY: eval for pneumonia   TECHNOLOGIST PROVIDED HISTORY:   eval for pneumonia       FINDINGS:   The lungs are without acute focal process. There is no effusion or   pneumothorax. The cardiomediastinal silhouette is without acute process. The   osseous structures are without acute process. Impression   No acute process. Medical Decision [id number]-[id number]:     Medical Decision Making-Other:   Note:  Labs, medications, radiologic studies were reviewed with personal review of films  Large amounts of data were reviewed  Discussed with nursing Staff, Discharge planner  Infection Control and Prevention measures reviewed  All prior entries were reviewed  Administer medications as ordered  Prognosis: [address]  Discharge planning reviewed    Thank you for allowing us to participate in the care of this patient. Please call with questions. JW Reeves    ATTESTATION:    I have discussed the case, including pertinent history and exam findings with the APRN.  I have evaluated the  History, physical findings and pictures of the patient and the key elements of the encounter have been performed by me. I have reviewed the laboratory data, other diagnostic studies and discussed them with the APRN. I have updated the medical record where necessary. I agree with the assessment, plan and orders as documented by the APRN.     Macey Torres MD.        Pager: [phone] - Office: [phone]

## 2022-10-28 NOTE — PROGRESS NOTES
Patient requested to see spiritual care. Assessment: When  entered room, patient was resting with eyes closed. Pt opened eyes at sound of 's voice. He said he would like to receive Communion before his surgery but did not know when surgery was scheduled.  checked chart; surgery is scheduled for 7:30 am tomorrow. Intervention: Because there will not be a [facility]  available to provide Communion in the morning,  provided Communion this afternoon.  also prayed with patient. Outcome: Pt appeared comforted by receiving Communion and thanked . Plan: Chaplains will remain available to offer spiritual and emotional support as needed.      Electronically signed by Odalys Ferguson on 10/28/2022 at 3:01 PM.  [facility]  [phone]        10/28/22 1458   Encounter Summary   Service Provided For: Patient   Referral/Consult From: Patient   Last Encounter  10/28/22   Complexity of Encounter Low   Begin Time 1435   End Time  1450   Total Time Calculated 15 min   Encounter    Type Pre-Op   Rituals, Rites and Sacraments   Type [facility] Communion   Assessment/Intervention/Outcome   Assessment Calm;Coping   Intervention Active listening;Explored/Affirmed feelings, thoughts, concerns;Prayer (assurance of)/[place]   Outcome Expressed feelings, needs, and concerns;Expressed Gratitude;Receptive

## 2022-10-28 NOTE — PROGRESS NOTES
Physical Therapy  Facility/Department: [facility] ONC/MED SURG  Daily Treatment Note  NAME: Chuyita Ritchie  : 1940  MRN: [id number]    Date of Service: 10/28/2022    Discharge Recommendations:  Patient would benefit from continued therapy after discharge     Patient Diagnosis(es): The encounter diagnosis was Gallstone pancreatitis. Assessment   Activity Tolerance: Patient tolerated evaluation without incident     Plan    Physcial Therapy Plan  General Plan:  (5-6x/week)  Current Treatment Recommendations: Strengthening;Balance training;Functional mobility training;Transfer training;Gait training;Stair training; Endurance training;Neuromuscular re-education; Therapeutic activities     Restrictions  Restrictions/Precautions  Restrictions/Precautions: Fall Risk, Contact Precautions, Up as Tolerated  Required Braces or Orthoses?: No  Position Activity Restriction  Other position/activity restrictions: up with assist; Hx of parkinsons     Subjective    Subjective  Subjective: The patient is pleasant and cooperative. Reports general back and LE pain, however does not rate. Orientation  Overall Orientation Status: Within Normal Limits  Orientation Level: Oriented X4  Cognition  Following Commands: Follows one step commands consistently; Follows multistep commands with increased time; Follows multistep commands with repitition     Objective   Vitals     Bed Mobility Training  Bed Mobility Training: Yes  Overall Level of Assistance: Additional time;Minimum assistance  Interventions: Verbal cues  Supine to Sit: Minimum assistance  Sit to Supine: Minimum assistance  Scooting: Minimum assistance  Gait Training  Gait Training: Yes  Gait  Overall Level of Assistance: Minimum assistance;Contact-guard assistance  Interventions: Verbal cues; Safety awareness training  Speed/Ewa: Slow;Shuffled  Distance (ft): 10 Feet  Assistive Device: Walker, rolling     PT Exercises  Exercise Treatment: Pt able to sit EOB x 15 minutes with no dizziness., STS x 3  with AD CGA  A/AROM Exercises: Seated exercises with BLEs x 10 reps each: ankle pumps, marches, hip abduction/adduction, and LAQs     Safety Devices  Type of Devices: Bed alarm in place;Call light within reach;Gait belt       Goals  Short Term Goals  Time Frame for Short Term Goals: 5 visits  Short Term Goal 1: Pt to ambulate 100 ft RW SBA  Short Term Goal 2: ascend/descend 6 steps with Mod I  Short Term Goal 3: pt to transfer from alternate height surfaces with mod I using appropriate AD  Short Term Goal 4: Pt to toleraate 30 minutes Mod I  Long Term Goals  Time Frame for Long Term Goals : 14 visits  Long Term Goal 1: Pt t ambulate 75 ft Mod I with cane  Patient Goals   Patient Goals : to return home alone    Education  Patient Education  Education Given To: Patient  Education Provided: Role of Therapy;Plan of Care;Transfer Training; Fall Prevention Strategies  Education Method: Verbal;Demonstration  Barriers to Learning: None  Education Outcome: Verbalized understanding    Therapy Time   Individual Concurrent Group Co-treatment   Time In 0119         Time Out 0144         Minutes 25         Timed Code Treatment Minutes: 25 Minutes       Saad Fair PT

## 2022-10-28 NOTE — PLAN OF CARE
Problem: Skin/Tissue Integrity  Goal: Absence of new skin breakdown  Description: 1. Monitor for areas of redness and/or skin breakdown  2. Assess vascular access sites hourly  3. Every 4-6 hours minimum:  Change oxygen saturation probe site  4. Every 4-6 hours:  If on nasal continuous positive airway pressure, respiratory therapy assess nares and determine need for appliance change or resting period.   10/28/2022 1712 by Rayray Bahena RN  Outcome: Progressing  10/28/2022 0503 by Alex Cobb RN  Outcome: Progressing     Problem: Safety - Adult  Goal: Free from fall injury  10/28/2022 1712 by Rayray Bahena RN  Outcome: Progressing  10/28/2022 0503 by Alex Cobb RN  Outcome: Progressing     Problem: ABCDS Injury Assessment  Goal: Absence of physical injury  10/28/2022 1712 by Rayray Bahena RN  Outcome: Progressing  10/28/2022 0503 by Alex Cobb RN  Outcome: Progressing     Problem: Chronic Conditions and Co-morbidities  Goal: Patient's chronic conditions and co-morbidity symptoms are monitored and maintained or improved  10/28/2022 1712 by Rayray Bahena RN  Outcome: Progressing  10/28/2022 0503 by Alex Cobb RN  Outcome: Progressing

## 2022-10-28 NOTE — PLAN OF CARE
Problem: Skin/Tissue Integrity  Goal: Absence of new skin breakdown  Description: 1. Monitor for areas of redness and/or skin breakdown  2. Assess vascular access sites hourly  3. Every 4-6 hours minimum:  Change oxygen saturation probe site  4. Every 4-6 hours:  If on nasal continuous positive airway pressure, respiratory therapy assess nares and determine need for appliance change or resting period.   10/28/2022 0503 by Stevo Bowen RN  Outcome: Progressing  10/27/2022 1708 by Jahaira Cotton RN  Outcome: Progressing  10/27/2022 1707 by Jahaira Cotton RN  Outcome: Progressing  10/27/2022 1705 by Jahaira Cotton RN  Outcome: Progressing     Problem: Safety - Adult  Goal: Free from fall injury  10/28/2022 0503 by Stevo Bowen RN  Outcome: Progressing  10/27/2022 1708 by Jahaira Cotton RN  Outcome: Progressing  10/27/2022 1707 by Jahaira Cotton RN  Outcome: Progressing  10/27/2022 1705 by Jahaira Cotton RN  Outcome: Progressing     Problem: ABCDS Injury Assessment  Goal: Absence of physical injury  10/28/2022 0503 by Stevo Bowen RN  Outcome: Progressing  10/27/2022 1708 by Jahaira Cotton RN  Outcome: Progressing  10/27/2022 1707 by Jahaira Cotton RN  Outcome: Progressing  10/27/2022 1705 by Jahaira Cotton RN  Outcome: Progressing     Problem: Chronic Conditions and Co-morbidities  Goal: Patient's chronic conditions and co-morbidity symptoms are monitored and maintained or improved  10/28/2022 0503 by Stevo Bowen RN  Outcome: Progressing  10/27/2022 1708 by Jahaira Cotton RN  Outcome: Progressing  10/27/2022 1707 by Jahaira Cotton RN  Outcome: Progressing  10/27/2022 1705 by Jahaira Cotton RN  Outcome: Progressing

## 2022-10-29 ENCOUNTER — ANESTHESIA EVENT (OUTPATIENT)
Dept: OPERATING ROOM | Age: 82
DRG: 853 | End: 2022-10-29
Payer: MEDICARE

## 2022-10-29 ENCOUNTER — ANESTHESIA (OUTPATIENT)
Dept: OPERATING ROOM | Age: 82
DRG: 853 | End: 2022-10-29
Payer: MEDICARE

## 2022-10-29 PROBLEM — N99.89 POSTOPERATIVE RETENTION OF URINE: Status: ACTIVE | Noted: 2022-10-29

## 2022-10-29 PROBLEM — R33.8 POSTOPERATIVE RETENTION OF URINE: Status: ACTIVE | Noted: 2022-10-29

## 2022-10-29 LAB
ABSOLUTE EOS #: 0.09 K/UL (ref 0–0.44)
ABSOLUTE IMMATURE GRANULOCYTE: 0.09 K/UL (ref 0–0.3)
ABSOLUTE LYMPH #: 0.93 K/UL (ref 1.1–3.7)
ABSOLUTE MONO #: 0.34 K/UL (ref 0.1–1.2)
ALBUMIN SERPL-MCNC: 3.3 G/DL (ref 3.5–5.2)
ALBUMIN/GLOBULIN RATIO: 0.9 (ref 1–2.5)
ALP BLD-CCNC: 155 U/L (ref 40–129)
ALT SERPL-CCNC: 74 U/L (ref 5–41)
ANION GAP SERPL CALCULATED.3IONS-SCNC: 12 MMOL/L (ref 9–17)
AST SERPL-CCNC: 84 U/L
BASOPHILS # BLD: 0 % (ref 0–2)
BASOPHILS ABSOLUTE: 0.03 K/UL (ref 0–0.2)
BILIRUB SERPL-MCNC: 1.2 MG/DL (ref 0.3–1.2)
BUN BLDV-MCNC: 12 MG/DL (ref 8–23)
CALCIUM SERPL-MCNC: 8.9 MG/DL (ref 8.6–10.4)
CHLORIDE BLD-SCNC: 103 MMOL/L (ref 98–107)
CO2: 20 MMOL/L (ref 20–31)
CREAT SERPL-MCNC: 1.42 MG/DL (ref 0.7–1.2)
EOSINOPHILS RELATIVE PERCENT: 1 % (ref 1–4)
GFR SERPL CREATININE-BSD FRML MDRD: 49 ML/MIN/1.73M2
GLUCOSE BLD-MCNC: 113 MG/DL (ref 75–110)
GLUCOSE BLD-MCNC: 155 MG/DL (ref 70–99)
GLUCOSE BLD-MCNC: 158 MG/DL (ref 75–110)
HCT VFR BLD CALC: 44.5 % (ref 40.7–50.3)
HEMOGLOBIN: 14.7 G/DL (ref 13–17)
IMMATURE GRANULOCYTES: 1 %
LIPASE: 14 U/L (ref 13–60)
LYMPHOCYTES # BLD: 10 % (ref 24–43)
MCH RBC QN AUTO: 31.6 PG (ref 25.2–33.5)
MCHC RBC AUTO-ENTMCNC: 33 G/DL (ref 28.4–34.8)
MCV RBC AUTO: 95.7 FL (ref 82.6–102.9)
MONOCYTES # BLD: 4 % (ref 3–12)
NRBC AUTOMATED: 0 PER 100 WBC
PDW BLD-RTO: 14 % (ref 11.8–14.4)
PLATELET # BLD: 163 K/UL (ref 138–453)
PMV BLD AUTO: 9.6 FL (ref 8.1–13.5)
POTASSIUM SERPL-SCNC: 4.3 MMOL/L (ref 3.7–5.3)
RBC # BLD: 4.65 M/UL (ref 4.21–5.77)
SEG NEUTROPHILS: 84 % (ref 36–65)
SEGMENTED NEUTROPHILS ABSOLUTE COUNT: 7.43 K/UL (ref 1.5–8.1)
SODIUM BLD-SCNC: 135 MMOL/L (ref 135–144)
TOTAL PROTEIN: 7 G/DL (ref 6.4–8.3)
WBC # BLD: 8.9 K/UL (ref 3.5–11.3)

## 2022-10-29 PROCEDURE — 82947 ASSAY GLUCOSE BLOOD QUANT: CPT

## 2022-10-29 PROCEDURE — 7100000000 HC PACU RECOVERY - FIRST 15 MIN: Performed by: SURGERY

## 2022-10-29 PROCEDURE — 51798 US URINE CAPACITY MEASURE: CPT

## 2022-10-29 PROCEDURE — 3700000001 HC ADD 15 MINUTES (ANESTHESIA): Performed by: SURGERY

## 2022-10-29 PROCEDURE — 80053 COMPREHEN METABOLIC PANEL: CPT

## 2022-10-29 PROCEDURE — 94761 N-INVAS EAR/PLS OXIMETRY MLT: CPT

## 2022-10-29 PROCEDURE — 2500000003 HC RX 250 WO HCPCS: Performed by: SURGERY

## 2022-10-29 PROCEDURE — 7100000001 HC PACU RECOVERY - ADDTL 15 MIN: Performed by: SURGERY

## 2022-10-29 PROCEDURE — 6370000000 HC RX 637 (ALT 250 FOR IP)

## 2022-10-29 PROCEDURE — 51701 INSERT BLADDER CATHETER: CPT

## 2022-10-29 PROCEDURE — 2709999900 HC NON-CHARGEABLE SUPPLY: Performed by: SURGERY

## 2022-10-29 PROCEDURE — 2580000003 HC RX 258: Performed by: SURGERY

## 2022-10-29 PROCEDURE — 2700000000 HC OXYGEN THERAPY PER DAY

## 2022-10-29 PROCEDURE — 3700000000 HC ANESTHESIA ATTENDED CARE: Performed by: SURGERY

## 2022-10-29 PROCEDURE — 0FT44ZZ RESECTION OF GALLBLADDER, PERCUTANEOUS ENDOSCOPIC APPROACH: ICD-10-PCS | Performed by: SURGERY

## 2022-10-29 PROCEDURE — 6360000002 HC RX W HCPCS: Performed by: NURSE ANESTHETIST, CERTIFIED REGISTERED

## 2022-10-29 PROCEDURE — 99232 SBSQ HOSP IP/OBS MODERATE 35: CPT | Performed by: INTERNAL MEDICINE

## 2022-10-29 PROCEDURE — 2580000003 HC RX 258: Performed by: STUDENT IN AN ORGANIZED HEALTH CARE EDUCATION/TRAINING PROGRAM

## 2022-10-29 PROCEDURE — 2580000003 HC RX 258

## 2022-10-29 PROCEDURE — 6370000000 HC RX 637 (ALT 250 FOR IP): Performed by: INTERNAL MEDICINE

## 2022-10-29 PROCEDURE — C9113 INJ PANTOPRAZOLE SODIUM, VIA: HCPCS

## 2022-10-29 PROCEDURE — 2580000003 HC RX 258: Performed by: NURSE ANESTHETIST, CERTIFIED REGISTERED

## 2022-10-29 PROCEDURE — 36415 COLL VENOUS BLD VENIPUNCTURE: CPT

## 2022-10-29 PROCEDURE — 2720000010 HC SURG SUPPLY STERILE: Performed by: SURGERY

## 2022-10-29 PROCEDURE — 2500000003 HC RX 250 WO HCPCS: Performed by: NURSE ANESTHETIST, CERTIFIED REGISTERED

## 2022-10-29 PROCEDURE — 6360000002 HC RX W HCPCS

## 2022-10-29 PROCEDURE — 3600000004 HC SURGERY LEVEL 4 BASE: Performed by: SURGERY

## 2022-10-29 PROCEDURE — 6360000002 HC RX W HCPCS: Performed by: STUDENT IN AN ORGANIZED HEALTH CARE EDUCATION/TRAINING PROGRAM

## 2022-10-29 PROCEDURE — 3600000014 HC SURGERY LEVEL 4 ADDTL 15MIN: Performed by: SURGERY

## 2022-10-29 PROCEDURE — 6360000002 HC RX W HCPCS: Performed by: ANESTHESIOLOGY

## 2022-10-29 PROCEDURE — 1200000000 HC SEMI PRIVATE

## 2022-10-29 PROCEDURE — 88304 TISSUE EXAM BY PATHOLOGIST: CPT

## 2022-10-29 PROCEDURE — 85025 COMPLETE CBC W/AUTO DIFF WBC: CPT

## 2022-10-29 PROCEDURE — 83690 ASSAY OF LIPASE: CPT

## 2022-10-29 RX ORDER — BUPIVACAINE HYDROCHLORIDE AND EPINEPHRINE 5; 5 MG/ML; UG/ML
INJECTION, SOLUTION EPIDURAL; INTRACAUDAL; PERINEURAL PRN
Status: DISCONTINUED | OUTPATIENT
Start: 2022-10-29 | End: 2022-10-29 | Stop reason: HOSPADM

## 2022-10-29 RX ORDER — METOCLOPRAMIDE HYDROCHLORIDE 5 MG/ML
10 INJECTION INTRAMUSCULAR; INTRAVENOUS
Status: DISCONTINUED | OUTPATIENT
Start: 2022-10-29 | End: 2022-10-29 | Stop reason: HOSPADM

## 2022-10-29 RX ORDER — ONDANSETRON 2 MG/ML
INJECTION INTRAMUSCULAR; INTRAVENOUS PRN
Status: DISCONTINUED | OUTPATIENT
Start: 2022-10-29 | End: 2022-10-29 | Stop reason: SDUPTHER

## 2022-10-29 RX ORDER — DIPHENHYDRAMINE HYDROCHLORIDE 50 MG/ML
12.5 INJECTION INTRAMUSCULAR; INTRAVENOUS
Status: DISCONTINUED | OUTPATIENT
Start: 2022-10-29 | End: 2022-10-29 | Stop reason: HOSPADM

## 2022-10-29 RX ORDER — MAGNESIUM HYDROXIDE 1200 MG/15ML
LIQUID ORAL CONTINUOUS PRN
Status: DISCONTINUED | OUTPATIENT
Start: 2022-10-29 | End: 2022-10-29 | Stop reason: HOSPADM

## 2022-10-29 RX ORDER — DROPERIDOL 2.5 MG/ML
0.62 INJECTION, SOLUTION INTRAMUSCULAR; INTRAVENOUS
Status: DISCONTINUED | OUTPATIENT
Start: 2022-10-29 | End: 2022-10-29 | Stop reason: HOSPADM

## 2022-10-29 RX ORDER — DEXAMETHASONE SODIUM PHOSPHATE 10 MG/ML
INJECTION, SOLUTION INTRAMUSCULAR; INTRAVENOUS PRN
Status: DISCONTINUED | OUTPATIENT
Start: 2022-10-29 | End: 2022-10-29 | Stop reason: SDUPTHER

## 2022-10-29 RX ORDER — FENTANYL CITRATE 50 UG/ML
INJECTION, SOLUTION INTRAMUSCULAR; INTRAVENOUS PRN
Status: DISCONTINUED | OUTPATIENT
Start: 2022-10-29 | End: 2022-10-29 | Stop reason: SDUPTHER

## 2022-10-29 RX ORDER — ROCURONIUM BROMIDE 10 MG/ML
INJECTION, SOLUTION INTRAVENOUS PRN
Status: DISCONTINUED | OUTPATIENT
Start: 2022-10-29 | End: 2022-10-29 | Stop reason: SDUPTHER

## 2022-10-29 RX ORDER — NEOSTIGMINE METHYLSULFATE 5 MG/5 ML
SYRINGE (ML) INTRAVENOUS PRN
Status: DISCONTINUED | OUTPATIENT
Start: 2022-10-29 | End: 2022-10-29 | Stop reason: SDUPTHER

## 2022-10-29 RX ORDER — OXYCODONE HYDROCHLORIDE 5 MG/1
5 TABLET ORAL EVERY 4 HOURS PRN
Status: DISCONTINUED | OUTPATIENT
Start: 2022-10-29 | End: 2022-11-01 | Stop reason: HOSPADM

## 2022-10-29 RX ORDER — SODIUM CHLORIDE 0.9 % (FLUSH) 0.9 %
5-40 SYRINGE (ML) INJECTION EVERY 12 HOURS SCHEDULED
Status: DISCONTINUED | OUTPATIENT
Start: 2022-10-29 | End: 2022-10-29 | Stop reason: HOSPADM

## 2022-10-29 RX ORDER — TAMSULOSIN HYDROCHLORIDE 0.4 MG/1
0.4 CAPSULE ORAL DAILY
Status: DISCONTINUED | OUTPATIENT
Start: 2022-10-29 | End: 2022-11-01 | Stop reason: HOSPADM

## 2022-10-29 RX ORDER — SODIUM CHLORIDE 9 MG/ML
25 INJECTION, SOLUTION INTRAVENOUS PRN
Status: DISCONTINUED | OUTPATIENT
Start: 2022-10-29 | End: 2022-10-29 | Stop reason: HOSPADM

## 2022-10-29 RX ORDER — HYDRALAZINE HYDROCHLORIDE 20 MG/ML
10 INJECTION INTRAMUSCULAR; INTRAVENOUS
Status: DISCONTINUED | OUTPATIENT
Start: 2022-10-29 | End: 2022-10-29 | Stop reason: HOSPADM

## 2022-10-29 RX ORDER — SODIUM CHLORIDE 0.9 % (FLUSH) 0.9 %
5-40 SYRINGE (ML) INJECTION PRN
Status: DISCONTINUED | OUTPATIENT
Start: 2022-10-29 | End: 2022-10-29 | Stop reason: HOSPADM

## 2022-10-29 RX ORDER — LIDOCAINE HYDROCHLORIDE 10 MG/ML
INJECTION, SOLUTION EPIDURAL; INFILTRATION; INTRACAUDAL; PERINEURAL PRN
Status: DISCONTINUED | OUTPATIENT
Start: 2022-10-29 | End: 2022-10-29 | Stop reason: SDUPTHER

## 2022-10-29 RX ORDER — SODIUM CHLORIDE, SODIUM LACTATE, POTASSIUM CHLORIDE, CALCIUM CHLORIDE 600; 310; 30; 20 MG/100ML; MG/100ML; MG/100ML; MG/100ML
INJECTION, SOLUTION INTRAVENOUS CONTINUOUS PRN
Status: DISCONTINUED | OUTPATIENT
Start: 2022-10-29 | End: 2022-10-29 | Stop reason: SDUPTHER

## 2022-10-29 RX ORDER — PROPOFOL 10 MG/ML
INJECTION, EMULSION INTRAVENOUS PRN
Status: DISCONTINUED | OUTPATIENT
Start: 2022-10-29 | End: 2022-10-29 | Stop reason: SDUPTHER

## 2022-10-29 RX ORDER — PHENYLEPHRINE HCL IN 0.9% NACL 1 MG/10 ML
SYRINGE (ML) INTRAVENOUS PRN
Status: DISCONTINUED | OUTPATIENT
Start: 2022-10-29 | End: 2022-10-29 | Stop reason: SDUPTHER

## 2022-10-29 RX ORDER — GLYCOPYRROLATE 0.2 MG/ML
INJECTION INTRAMUSCULAR; INTRAVENOUS PRN
Status: DISCONTINUED | OUTPATIENT
Start: 2022-10-29 | End: 2022-10-29 | Stop reason: SDUPTHER

## 2022-10-29 RX ADMIN — ROCURONIUM BROMIDE 50 MG: 10 INJECTION, SOLUTION INTRAVENOUS at 07:44

## 2022-10-29 RX ADMIN — HYDROMORPHONE HYDROCHLORIDE 0.5 MG: 1 INJECTION, SOLUTION INTRAMUSCULAR; INTRAVENOUS; SUBCUTANEOUS at 09:39

## 2022-10-29 RX ADMIN — SODIUM CHLORIDE, POTASSIUM CHLORIDE, SODIUM LACTATE AND CALCIUM CHLORIDE: 600; 310; 30; 20 INJECTION, SOLUTION INTRAVENOUS at 07:25

## 2022-10-29 RX ADMIN — LIDOCAINE HYDROCHLORIDE 50 MG: 10 INJECTION, SOLUTION EPIDURAL; INFILTRATION; INTRACAUDAL; PERINEURAL at 07:42

## 2022-10-29 RX ADMIN — GLYCOPYRROLATE 0.6 MG: 0.2 INJECTION INTRAMUSCULAR; INTRAVENOUS at 08:54

## 2022-10-29 RX ADMIN — SODIUM CHLORIDE, PRESERVATIVE FREE 40 MG: 5 INJECTION INTRAVENOUS at 11:17

## 2022-10-29 RX ADMIN — METOPROLOL TARTRATE 12.5 MG: 25 TABLET ORAL at 20:47

## 2022-10-29 RX ADMIN — Medication 50 MCG: at 07:58

## 2022-10-29 RX ADMIN — FENTANYL CITRATE 25 MCG: 50 INJECTION, SOLUTION INTRAMUSCULAR; INTRAVENOUS at 07:55

## 2022-10-29 RX ADMIN — PROPOFOL 200 MG: 10 INJECTION, EMULSION INTRAVENOUS at 07:44

## 2022-10-29 RX ADMIN — ENTACAPONE 200 MG: 200 TABLET, FILM COATED ORAL at 11:15

## 2022-10-29 RX ADMIN — HYDROMORPHONE HYDROCHLORIDE 0.25 MG: 1 INJECTION, SOLUTION INTRAMUSCULAR; INTRAVENOUS; SUBCUTANEOUS at 09:33

## 2022-10-29 RX ADMIN — FENTANYL CITRATE 25 MCG: 50 INJECTION, SOLUTION INTRAMUSCULAR; INTRAVENOUS at 08:58

## 2022-10-29 RX ADMIN — GABAPENTIN 100 MG: 100 CAPSULE ORAL at 20:48

## 2022-10-29 RX ADMIN — FENTANYL CITRATE 25 MCG: 50 INJECTION, SOLUTION INTRAMUSCULAR; INTRAVENOUS at 08:09

## 2022-10-29 RX ADMIN — LIDOCAINE HYDROCHLORIDE 200 MG: 10 INJECTION, SOLUTION EPIDURAL; INFILTRATION; INTRACAUDAL; PERINEURAL at 07:44

## 2022-10-29 RX ADMIN — Medication 3 MG: at 08:54

## 2022-10-29 RX ADMIN — GABAPENTIN 100 MG: 100 CAPSULE ORAL at 14:28

## 2022-10-29 RX ADMIN — ENTACAPONE 200 MG: 200 TABLET, FILM COATED ORAL at 20:47

## 2022-10-29 RX ADMIN — FENTANYL CITRATE 25 MCG: 50 INJECTION, SOLUTION INTRAMUSCULAR; INTRAVENOUS at 08:38

## 2022-10-29 RX ADMIN — MAGNESIUM GLUCONATE 500 MG ORAL TABLET 400 MG: 500 TABLET ORAL at 11:15

## 2022-10-29 RX ADMIN — OXYCODONE 5 MG: 5 TABLET ORAL at 15:52

## 2022-10-29 RX ADMIN — PIPERACILLIN AND TAZOBACTAM 3375 MG: 3; .375 INJECTION, POWDER, FOR SOLUTION INTRAVENOUS at 14:15

## 2022-10-29 RX ADMIN — CARBIDOPA AND LEVODOPA 1 TABLET: 25; 100 TABLET ORAL at 11:15

## 2022-10-29 RX ADMIN — CARBIDOPA AND LEVODOPA 1 TABLET: 25; 100 TABLET ORAL at 20:48

## 2022-10-29 RX ADMIN — AMITRIPTYLINE HYDROCHLORIDE 10 MG: 10 TABLET, FILM COATED ORAL at 20:48

## 2022-10-29 RX ADMIN — OXYCODONE 5 MG: 5 TABLET ORAL at 20:48

## 2022-10-29 RX ADMIN — ONDANSETRON 4 MG: 2 INJECTION INTRAMUSCULAR; INTRAVENOUS at 08:53

## 2022-10-29 RX ADMIN — DEXAMETHASONE SODIUM PHOSPHATE 5 MG: 10 INJECTION, SOLUTION INTRAMUSCULAR; INTRAVENOUS at 08:11

## 2022-10-29 RX ADMIN — ALLOPURINOL 100 MG: 100 TABLET ORAL at 11:15

## 2022-10-29 RX ADMIN — ROPINIROLE HYDROCHLORIDE 0.25 MG: 0.25 TABLET, FILM COATED ORAL at 20:48

## 2022-10-29 RX ADMIN — TAMSULOSIN HYDROCHLORIDE 0.4 MG: 0.4 CAPSULE ORAL at 16:51

## 2022-10-29 RX ADMIN — PIPERACILLIN AND TAZOBACTAM 3375 MG: 3; .375 INJECTION, POWDER, FOR SOLUTION INTRAVENOUS at 03:58

## 2022-10-29 ASSESSMENT — ENCOUNTER SYMPTOMS
COUGH: 0
VOMITING: 0
ABDOMINAL PAIN: 1
NAUSEA: 0
SHORTNESS OF BREATH: 0

## 2022-10-29 ASSESSMENT — PAIN DESCRIPTION - ORIENTATION: ORIENTATION: RIGHT

## 2022-10-29 ASSESSMENT — PAIN DESCRIPTION - PAIN TYPE
TYPE: SURGICAL PAIN
TYPE: SURGICAL PAIN

## 2022-10-29 ASSESSMENT — PAIN DESCRIPTION - LOCATION: LOCATION: ABDOMEN

## 2022-10-29 ASSESSMENT — PAIN SCALES - GENERAL
PAINLEVEL_OUTOF10: 8
PAINLEVEL_OUTOF10: 7
PAINLEVEL_OUTOF10: 5
PAINLEVEL_OUTOF10: 5

## 2022-10-29 ASSESSMENT — PAIN - FUNCTIONAL ASSESSMENT: PAIN_FUNCTIONAL_ASSESSMENT: 0-10

## 2022-10-29 NOTE — PROGRESS NOTES
[facility]  Office: [address], DO, Dana Stain, DO, Demetrius Hallman, DO, Horace Hedrick Blood, DO, Cordelia Davis MD, Melanie Wisdom MD, Miguel Reid MD, Flo Ho MD,  Brenda Flores MD, Evette Biggs MD, Mandeep Rodriguez, DO, Arti Bolden MD,  Angelica Vaughn MD, Lou Lara MD, Priscilla Hampton, DO, Socorro Warren MD, Iam Aldana MD, Toma Hernandez, DO, Kaylin Galindo MD, Jaime Roper MD, Dewayne Meredith MD, Cyndy Nicole MD, Betty Celestin, DO, Cheri Connor MD, Reina López MD, Barbie Barry, CNP,  Matthew Prado, CNP, Francisco Conrad, CNP, June Albarran, CNP,  Vinicius El, DNP, Khushboo Tyler, CNP, Scott Ch, CNP, Lee Ann Boss, CNP, Ash Handley, CNP, Marcelo Perry, CNP, Rylee Zapien PA-C, Tanner Bianchi, CNS, Ajit Cotton, DNP, Mishel Markham, CNP, Cynthia Arce, CNP, Zonia Matias, [facility]    Progress Note    10/29/2022    3:06 PM    Name:   Archie Shea  MRN:     [id number]     Acct:      [de-identified]   Room:   [address] [place] [address] Day:  3  Admit Date:  10/25/2022  8:54 PM    PCP:   JW Live CNP  Code Status:  Full Code    Subjective:     C/C:   Chief Complaint   Patient presents with    Fatigue     Generalized weakness, intermittent, hx of parkinsons      Interval History Status:   Postop laparoscopic cholecystectomy  Reporting that his pain is controlled  Doing okay with clear liquids  Patient has urine retention with residual volume greater than 800 mL    Data Base Updates:  WBC8.9k/uL RBC4.65m/uL Nenywxfdkp89.7     Alkaline Hvnsabbsqwf866 High U/L ALT74 High U/L AST84 High U/L Total Bilirubin1.2     Hrwzfh08     Dammwfd153 High mg/dL     YAU08vc/dL   Creatinine1.42 High        Brief History:     As documented in the medical record:  \"Pt is a 79 year male with history of Parkinsons, CKD, stroke, NSTEMI who presents with 3-4 days of abdominal pain, nausea, vomiting and overall feeling unwell. Pt reports the pain and fatigue began a few days ago and he has been vomiting after eating. Has reported multiple loose stools, body aches, generalized fatigue. Pt denies having episodes like this in the past. Pt denies any history of abdominal surgeries. Pt denies fever, chest pain, shortness of breath, pnd, orthopnea, leg swelling, cough, hematuria. He reports compliance to plavix. Was advised by friend to go to er after visiting him today. Patient denies any other symptoms at this time. Admitted for further work up and stabilization \"    The intitial assessment and plan included:  \"    * (Principal) Cholecystitis 10/26/2022 Yes     #Sepsis secondary to acute cholecystis   #Moderate Acute Cholecystitis,   -concern for Gallstone pancreatitis  -alk phos 230, lipase 845, bilirubin 3.1, ,   -lactic 3.1, wbc 13.4 w/left shift, ,   -UA negative for bilirubin -->concern for blocked duct   -CT abdomen: Liver is hypodense. 15 mm lamellated gallstone noted. Gallbladder wall thickening noted. Nml appearing pancreas. Fat containing bilateral inguinal hernias  -2 cultures obtian in ER  -Empiric Abx, IVF, urine output, analgesia with nsaid caution given TAMARA, Protonix, US of RUQ if questionable can consider MRCP, NPO, direct bili, prepare for surgery if deemed candidate, he has known findings of large stones in past imaging. Cholestatic liver pattern and no bili in urine. MRCP may delay care. Monitor for gangrene, perforation, peritonitis, ARDS, septic shock.  -Consider early cholecystectomy or percutaneous cholecystomy . Type and screen,   -can narrow abx to metro and ceftriaxone once intial resuscitation and work up complete . Fu on cultures . Serial abdominal exams   -confirm 1 large bore IVS. Must have good Iv access before admission to floors.    -ID and surgery consult        #TAMARA on CKD3  -on admission Cr 1.7, bun 22, bicarb 23,   -Baseline cr 1.4,  BPH  -last echo EF 60-65% in 2021  -UA unrevealing   -NSAID with caution, IVF, bladder scans, au, reassess in morning     #CAD  -denied cp but had a cath in 6/22 for recurrent angina. Cath report:  1. Single vessel coronary artery disease. Patent stent in ostial LAD. Mild Nonobstructive CAD otherwise. LV gram not done due to chronic renal insufficiency. No stent placed, Imdur was added and risk factor modification   -troponin on admission 61-->46, with angelita on ckd  -hx of stent on plavix  -Plavix on hold for possible intervention []  -trend troponin and ekg  -Hold imdur incase hypotension due to shock.   -resume home metoprolol       #frequent falls  -hx of CVA, parkinson's, underlying parkinson's  -carotid US in 2019 without high grade stenosis  -fall precautions, neuro checks       #Lung nodule  -seen incidentally on ct abdomen: 10 mm spiculated nodule  right lung base. >8mm - immediate chest CT for further evaluation.  -Not seen on CT from 6/27/22  PLAN  FU on chest CT     #BPH  -bladder scans prn  -au placed for strict I and o\"     Database has included:  MR:  Impression:  Cholelithiasis with findings suggesting acute cholecystitis. No biliary or pancreatic ductal dilatation. No evidence of   choledocholithiasis. Mild hepatic steatosis.          On 10/29 patient underwent:  LAPROSCOPIC CHOLECYSTECTOMY    Postop bladder scan reveals a residual volume of 821 mL    Past Medical History:   has a past medical history of 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 ([address] CKD (chronic kidney disease) stage 2, GFR 60-89 ml/min, CKD (chronic kidney disease) stage 3, GFR 30-59 ml/min ([facility]), Diverticulosis of colon, GERD (gastroesophageal reflux disease), History of non-ST elevation myocardial infarction (NSTEMI) 6/2022, Impaired renal function, MRSA (methicillin resistant staph aureus) culture positive, Osteoarthritis of knee, Parkinson disease ([address] Proteinuria, Skull fracture ([address] Temporary loss of eyesight, and Tubular adenoma of colon. Social History:   reports that he has quit smoking. He has never used smokeless tobacco. He reports that he does not drink alcohol and does not use drugs. Family History:   Family History   Problem Relation Age of Onset    No Known Problems Mother     No Known Problems Father        Review of Systems:     Review of Systems   Constitutional:  Positive for activity change (Decreased) and appetite change (improving). Respiratory:  Negative for cough and shortness of breath. Cardiovascular:  Negative for chest pain and palpitations. Gastrointestinal:  Positive for abdominal pain (His incisional pain is controlled). Negative for nausea (Resolved) and vomiting (Resolved). Genitourinary:  Positive for difficulty urinating (Bladder scan reveals a residual volume of 821 mL). Negative for flank pain and hematuria. Neurological:  Positive for tremors (Known Parkinson's). Physical Examination:        Vitals  /69   Pulse 61   Temp 97.7 °F (36.5 °C) (Oral)   Resp 18   Ht 5' 7\" (1.702 m)   Wt 183 lb 6.8 oz (83.2 kg)   SpO2 100%   BMI 28.73 kg/m²   Temp (24hrs), Av.5 °F (36.4 °C), Min:97 °F (36.1 °C), Max:98.4 °F (36.9 °C)    Recent Labs     10/27/22  0744 10/29/22  0658 10/29/22  1148   POCGLU 103 113* 158*       Physical Exam  Vitals reviewed. Constitutional:       General: He is not in acute distress. Appearance: He is not diaphoretic. HENT:      Head: Normocephalic. Nose: Nose normal.   Eyes:      General: No scleral icterus. Conjunctiva/sclera: Conjunctivae normal.   Neck:      Trachea: No tracheal deviation. Cardiovascular:      Rate and Rhythm: Normal rate and regular rhythm. Pulmonary:      Effort: Pulmonary effort is normal. No respiratory distress. Breath sounds: Normal breath sounds. No wheezing or rales. Chest:      Chest wall: No tenderness. Abdominal:      General: There is no distension. Palpations: Abdomen is soft. Tenderness: There is no abdominal tenderness. There is no guarding. Musculoskeletal:         General: No tenderness. Cervical back: Neck supple. Skin:     General: Skin is warm and dry. Comments: Incisional sites are clean and dry   Neurological:      Mental Status: He is alert. Comments: Resting tremor  Bradykinesia       Medications: Allergies: Allergies   Allergen Reactions    Dye [Iodides]      pain    Aspirin      Brain bleed         Current Meds:   Scheduled Meds:    tamsulosin  0.4 mg Oral Daily    sodium chloride flush  5-40 mL IntraVENous 2 times per day    metoprolol tartrate  12.5 mg Oral BID    pantoprazole (PROTONIX) 40 mg injection  40 mg IntraVENous Daily    allopurinol  100 mg Oral Daily    amitriptyline  10 mg Oral Nightly    carbidopa-levodopa  1 tablet Oral BID    entacapone  200 mg Oral BID    gabapentin  100 mg Oral TID    rOPINIRole  0.25 mg Oral Nightly    magnesium oxide  400 mg Oral Daily    piperacillin-tazobactam  3,375 mg IntraVENous Q8H     Continuous Infusions:    sodium chloride      dextrose       PRN Meds: oxyCODONE, sodium chloride flush, sodium chloride, ondansetron **OR** ondansetron, acetaminophen **OR** acetaminophen, morphine, glucose, dextrose bolus **OR** dextrose bolus, glucagon (rDNA), dextrose, metoprolol    Data:     I/O (24Hr):     Intake/Output Summary (Last 24 hours) at 10/29/2022 1506  Last data filed at 10/29/2022 0855  Gross per 24 hour   Intake 400 ml   Output 1350 ml   Net -950 ml       Labs:  Hematology:  Recent Labs     10/27/22  0608 10/28/22  0351 10/29/22  1039   WBC 8.8 7.7 8.9   RBC 4.02* 4.27 4.65   HGB 13.0 13.3 14.7   HCT 38.4* 40.2* 44.5   MCV 95.5 94.1 95.7   MCH 32.3 31.1 31.6   MCHC 33.9 33.1 33.0   RDW 13.9 13.8 14.0    158 163   MPV 9.5 9.7 9.6     Chemistry:  Recent Labs     10/27/22  0608 10/28/22  0351 10/29/22  1039    135 135   K 4.4 4.3 4.3    101 103   CO2 21 22 20   GLUCOSE 103* 113* 155* BUN 21 15 12   CREATININE 1.42* 1.35* 1.42*   ANIONGAP 10 12 12   LABGLOM 49* 52* 49*   CALCIUM 8.8 9.0 8.9   CAION  --  1.15  --      Recent Labs     10/26/22  1837 10/27/22  0608 10/27/22  0744 10/28/22  0351 10/29/22  0658 10/29/22  1039 10/29/22  1148   PROT 6.5 6.2*  --  6.6  --  7.0  --    LABALBU 3.4* 3.4*  --  3.3*  --  3.3*  --    * 83*  --  48*  --  84*  --    * 70*  --  21  --  74*  --    ALKPHOS 152* 143*  --  148*  --  155*  --    BILITOT 3.1* 2.1*  --  1.3*  --  1.2  --    BILIDIR 2.3*  --   --   --   --   --   --    LIPASE  --  16  --  15  --  14  --    POCGLU  --   --  103  --  113*  --  158*     ABG:No results found for: POCPH, PHART, PH, POCPCO2, PGI0UMY, PCO2, POCPO2, PO2ART, PO2, POCHCO3, QVJ0HEQ, HCO3, NBEA, PBEA, BEART, BE, THGBART, THB, LCI5FLL, IZDB6QVJ, U8RHZKQH, O2SAT, FIO2  Lab Results   Component Value Date/Time    SPECIAL LEFT HAND 2.5ML 10/26/2022 12:07 AM     Lab Results   Component Value Date/Time    CULTURE NO GROWTH 3 DAYS 10/26/2022 12:07 AM       Radiology:  CT ABDOMEN PELVIS WO CONTRAST Additional Contrast? None    Result Date: 10/26/2022  Cholelithiasis and acute cholecystitis. Gallbladder ultrasound would be helpful for further characterization. 10 mm spiculated nodule right lung base. RECOMMENDATIONS: Guidelines for follow-up and management of pulmonary nodules found on abdomen CT: >8mm - immediate chest CT for further evaluation. Radiology 2017 http://pubs. rsna.org/doi/full/10.1148/radiol. [email or web address]     XR CHEST (2 VW)    Result Date: 10/25/2022  No acute process. CT CHEST WO CONTRAST    Result Date: 10/26/2022  1.  The spiculated 11 mm nodule described on the CT abdomen study demonstrates a more conspicuous central calcification, with multiple similar appearing areas of peribronchial nodules with calcifications seen in the right lower lobe, felt likely related to sequela of prior inflammatory/infectious process, and very unlikely to represent a neoplastic pectoris ([facility] [I25.119]      Priority: Medium    Stage 3b chronic kidney disease (CKD) ([facility] [N18.32] 04/20/2022     Priority: Medium    Parkinson disease ([facility] Tresia Art 01/16/2020    Pulmonary hypertension, mild ([facility] [I27.20] 08/31/2017    Essential hypertension [I10] 03/23/2015    CKD (chronic kidney disease) stage 3, GFR 30-59 ml/min ([facility]) [N18.30] 03/04/2014    Intracranial bleed : traumatic left sub-dural hematoma ,managed conservatively in 2011 [I62.9] 07/09/2013         Plan:        Surgical evaluation  Straight cath as needed urine retention  Flomax  Will resume antiplatelet therapy ASAP (stents):  Plavix   Antibiotics per Infectious Disease service   -Zosyn   Pain management  Trend LFTs  Trend lipase  Check bun and creatinine  Avoid nephrotoxins  Renal follow-up Dr. Bri Keller   Blood Pressure - Monitor and control   Pulmonary nodule surveillance and follow-up outpatient  Sinemet  PT/OT  Neurology follow-up Dr. Aguilera Commons: Parkinson's   DVT prophylaxis    IP CONSULT TO [address] TO HOSPITALIST  IP CONSULT TO INFECTIOUS DISEASES  IP CONSULT TO DO Mary  10/29/2022  3:06 PM

## 2022-10-29 NOTE — ANESTHESIA PRE PROCEDURE
Department of Anesthesiology  Preprocedure Note       Name:  Ricardo Conteh   Age:  79 y.o.  :  1940                                          MRN:  [id number]         Date:  10/29/2022      Surgeon: Rosmery Nieves):  Jeison Mccarty DO    Procedure: Procedure(s):  LAPROSCOPIC CHOLECYSTECTOMY, POSSIBLE OPEN    Medications prior to admission:   Prior to Admission medications    Medication Sig Start Date End Date Taking? Authorizing Provider   gabapentin (NEURONTIN) 100 MG capsule TAKE 1 CAPSULE BY MOUTH 3 TIMES DAILY FOR NERVES 9/22/22 10/22/22  JW Anderson CNP   pantoprazole (PROTONIX) 40 MG tablet TAKE 1 TABLET BY MOUTH ONCE DAILY FOR GERD 22   JW Anderson CNP   isosorbide mononitrate (IMDUR) 30 MG extended release tablet TAKE 1 TABLET BY MOUTH ONCE DAILY FOR HYPERTENSION 22   JW Anderson CNP   metoprolol tartrate (LOPRESSOR) 25 MG tablet TAKE ONE HALF (1/2) ATBLETS = 12.5MG BY MOUTH TWICE A DAY FOR HYPERTENSION 22   JW Anderson CNP   carbidopa-levodopa (SINEMET)  MG per tablet TAKE ONE (1) TABLET BY MOUTH FOUR (4) TIMES DAILY 22   JW Anderson CNP   esomeprazole (NEXIUM) 20 MG delayed release capsule TAKE ONE (1) CAPSULE BY MOUTH ONCE DAILY IN THE MORNING BEFORE BREAKFAST 22   JW Anderson CNP   rOPINIRole (REQUIP) 0.25 MG tablet TAKE ONE (1) TABLET BY MOUTH THREE (3) TIMES DAILY 22   JW Anderson CNP   atorvastatin (LIPITOR) 40 MG tablet take 1 tablet by mouth once daily 22   JW Anderson CNP   entacapone (COMTAN) 200 MG tablet TAKE ONE (1) TABLET BY MOUTH FOUR (4) TIMES DAILY WITH SINEMET 22   Isabel Davis MD   nitroGLYCERIN (NITROSTAT) 0.4 MG SL tablet up to max of 3 total doses.  If no relief after 1 dose, call 911. 22   Yelena Shen, APRN - NP   magnesium oxide (MAG-OX) 400 (240 Mg) MG tablet TAKE 1 TABLET BY MOUTH ONCE DAILY 22   Jason Campos JW Carpio CNP   diclofenac sodium (VOLTAREN) 1 % GEL Apply 2 g topically 2 times daily as needed for Pain (joint pain) 5/9/22   JW García NP   amitriptyline (ELAVIL) 10 MG tablet Take 1 tablet by mouth nightly 4/5/22   Cindy Murray MD   clopidogrel (PLAVIX) 75 MG tablet TAKE 1 TABLET BY MOUTH ONCE DAILY  11/5/21   JW Stockton CNP   allopurinol (ZYLOPRIM) 100 MG tablet TAKE 1 TABLET BY MOUTH ONCE DAILY  11/5/21   JW Stockton CNP   acetaminophen (TYLENOL 8 HOUR ARTHRITIS PAIN) 650 MG extended release tablet Take 1 tablet by mouth every 8 hours as needed for Pain 6/14/21   JW Mitchell CNP   butalbital-acetaminophen-caffeine (FIORICET, ESGIC) -40 MG per tablet Take 1 tab prn only for severe headache. Can repeat after 4 hrs if needed. Max 2 tabs/24 hrs.  Max 4 tabs/week 6/14/21   JW Mitchell CNP       Current medications:    Current Facility-Administered Medications   Medication Dose Route Frequency Provider Last Rate Last Admin    sodium chloride flush 0.9 % injection 5-40 mL  5-40 mL IntraVENous 2 times per day JW Nash CNP   10 mL at 10/28/22 2015    sodium chloride flush 0.9 % injection 10 mL  10 mL IntraVENous PRN JW Nash - CNP        0.9 % sodium chloride infusion   IntraVENous PRN JW Nash CNP        ondansetron (ZOFRAN-ODT) disintegrating tablet 4 mg  4 mg Oral Q8H PRN JW Nash CNP        Or    ondansetron [facility]) injection 4 mg  4 mg IntraVENous Q6H PRN JW Nash CNP        acetaminophen (TYLENOL) tablet 650 mg  650 mg Oral Q6H PRN JW Nash - CNP   650 mg at 10/28/22 2111    Or    acetaminophen (TYLENOL) suppository 650 mg  650 mg Rectal Q6H PRN JW Nash CNP        morphine injection 2 mg  2 mg IntraVENous Q4H PRN Avni Wynn MD   2 mg at 10/26/22 4143    glucose chewable tablet 16 g  4 tablet Oral PRN Avni Wynn MD        dextrose bolus 10% 125 mL  125 mL IntraVENous PRN Mercedes Lara MD        Or    dextrose bolus 10% 250 mL  250 mL IntraVENous PRN Mercedes Lara MD        glucagon (rDNA) injection 1 mg  1 mg SubCUTAneous PRN Mercedes Lara MD        dextrose 10 % infusion   IntraVENous Continuous PRN Mercedes Lara MD        metoprolol tartrate (LOPRESSOR) tablet 12.5 mg  12.5 mg Oral BID Mercedes Lara MD   12.5 mg at 10/27/22 0940    metoprolol (LOPRESSOR) injection 5 mg  5 mg IntraVENous Q6H PRN Mercedes Lara MD        pantoprazole (PROTONIX) 40 mg in sodium chloride (PF) 0.9 % 10 mL injection  40 mg IntraVENous Daily Lucero Stevens MD   40 mg at 10/28/22 0837    allopurinol (ZYLOPRIM) tablet 100 mg  100 mg Oral Daily Lucero Stevens MD   100 mg at 10/28/22 0837    amitriptyline (ELAVIL) tablet 10 mg  10 mg Oral Nightly Lucero Stevens MD   10 mg at 10/28/22 2024    carbidopa-levodopa (SINEMET)  MG per tablet 1 tablet  1 tablet Oral BID Lucero Stevens MD   1 tablet at 10/28/22 2024    entacapone (COMTAN) tablet 200 mg  200 mg Oral BID Lucero Stevens MD   200 mg at 10/28/22 2024    gabapentin (NEURONTIN) capsule 100 mg  100 mg Oral TID Lucero Stevens MD   100 mg at 10/28/22 2024    rOPINIRole (REQUIP) tablet 0.25 mg  0.25 mg Oral Nightly Lucero Stevens MD   0.25 mg at 10/28/22 2024    magnesium oxide (MAG-OX) tablet 400 mg  400 mg Oral Daily Lucero Stevens MD   400 mg at 10/28/22 0837    piperacillin-tazobactam (ZOSYN) 3,375 mg in dextrose 5 % 50 mL IVPB extended infusion (mini-bag)  3,375 mg IntraVENous Q8H Onetha Phoenix, DO 12.5 mL/hr at 10/29/22 0358 3,375 mg at 10/29/22 0358       Allergies:     Allergies   Allergen Reactions    Dye [Iodides]      pain    Aspirin      Brain bleed         Problem List:    Patient Active Problem List   Diagnosis Code    Temporary loss of eyesight H53.129    Syncope : posterior circulation stroke vs Neurocardiogenic syncope  R55    Intracranial bleed : traumatic left sub-dural hematoma ,managed conservatively in 2011 I62.9    Headache R51.9    CKD (chronic kidney disease) stage 3, GFR 30-59 ml/min ([facility]) N18.30    Depression F32. A    Hyperlipidemia E78.5    Microhematuria R31.29    Microalbuminuria R80.9    Essential hypertension I10    Generalized abdominal pain R10.84    Tubular adenoma of colon D12.6    Diverticulosis of colon K57.30    History of skull fracture Z87.81    Nausea R11.0    Pure hypercholesterolemia E78.00    Prediabetes R73.03    Parkinson disease ([facility]) G20    Chronic post-traumatic headache, not intractable G44.329    Fall (on) (from) other stairs and steps, initial encounter W10. 8XXA    Strain of iliopsoas muscle, initial encounter S76.919A    Chronic pain of left knee M25.562, G89.29    Closed fracture of second toe of right foot S92.501A    Closed fracture of second toe of left foot S92.502A    Abnormal ECG R94.31    Cerebrovascular accident ([facility] I63.9    Chest pain, unspecified R07.9    Hematoma of scalp S00. 03XA    Left ventricular hypertrophy I51.7    Mild aortic valve regurgitation I35.1    Pulmonary hypertension, mild ([facility]) I27.20    Lumbar radiculopathy M54.16    Dizziness R42    Hyponatremia E87.1    Vomiting R11.10    General weakness R53.1    Overweight (BMI 25.0-29. 9) E66.3    Frequent falls R29.6    Symptomatic bradycardia R00.1    Unspecified inflammatory spondylopathy, 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[facility]) M46.96    Stage 3b chronic kidney disease (CKD) ([facility]) N18.32    Chronic pain of multiple joints M25.50, G89.29    Multiple comorbid conditions R69    Gout M10.9    Nerve pain M79.2    NSTEMI (non-ST elevated myocardial infarction) ([facility] I21.4    History of subdural hematoma Z86.79    Coronary artery disease involving native heart with angina pectoris ([facility]) I25.119    Lumbar facet arthropathy M47.816    Spinal stenosis of lumbar region without neurogenic claudication M48.061    Generalized weakness R53.1    Lumbosacral spondylosis without myelopathy M47.817    Gallstone pancreatitis K85.10    Calculus of gallbladder with acute cholecystitis without obstruction K80.00    Bandemia D72.825    Pulmonary nodule R91.1    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    Acute cholecystitis K81.0    TAMARA (acute kidney injury) (HCC) N17.9    History of non-ST elevation myocardial infarction (NSTEMI) 6/2022 I25.2       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 ([facility] 3/18/2011    d/t being hurt on the job    CKD (chronic kidney disease) stage 2, GFR 60-89 ml/min     CKD (chronic kidney disease) stage 3, GFR 30-59 ml/min (HCC)     Diverticulosis of colon     GERD (gastroesophageal reflux disease)     History of non-ST elevation myocardial infarction (NSTEMI) 6/2022 10/27/2022    Impaired renal function     MRSA (methicillin resistant staph aureus) culture positive 9/23/2015    leg    Osteoarthritis of knee     Left    Parkinson disease ([facility]     Proteinuria     Skull fracture ([facility] 3/18/2011    d/t 12-foot drop on the job    Temporary loss of eyesight     periodically, happened x7    [facility]       Past Surgical History:        Procedure Laterality Date    COLONOSCOPY  11/02/2012    poor prep, tubular adenoma    COLONOSCOPY  09/19/2017    diverticulosis, multiple polyps as described, spot marking done, pathology-tubular adenoma x 4    NV COLSC FLX W/RMVL OF TUMOR POLYP LESION SNARE TQ N/A 9/19/2017    COLONOSCOPY POLYPECTOMY SNARE/COLD BIOPSY with tatooing and apc transverse colon performed by Hallie Hay MD at [address] [place] [address] Right     rotator cuff       Social History:    Social History     Tobacco Use    Smoking status: Former    Smokeless tobacco: Never   Substance Use Topics    Alcohol use:  No                                Counseling given: Not Answered      Vital Signs (Current):   Vitals:    10/28/22 2000 10/28/22 2015 10/29/22 0000 10/29/22 0315   BP: 110/60  110/61 112/60   Pulse: 65 61 60 61   Resp: 17  17 16   Temp: 97.2 °F (36.2 °C)  97 °F (36.1 °C) 97.1 °F (36.2 °C)   TempSrc: Oral  Oral Oral   SpO2: 96%  96% 97%   Weight:       Height:                                                  BP Readings from Last 3 Encounters:   10/29/22 112/60   09/29/22 (!) 128/96   09/19/22 107/62       NPO Status:                                                                                 BMI:   Wt Readings from Last 3 Encounters:   10/28/22 183 lb 6.8 oz (83.2 kg)   09/29/22 178 lb (80.7 kg)   09/19/22 179 lb (81.2 kg)     Body mass index is 28.73 kg/m².     CBC:   Lab Results   Component Value Date/Time    WBC 7.7 10/28/2022 03:51 AM    RBC 4.27 10/28/2022 03:51 AM    RBC 5.14 02/24/2012 11:00 AM    HGB 13.3 10/28/2022 03:51 AM    HCT 40.2 10/28/2022 03:51 AM    MCV 94.1 10/28/2022 03:51 AM    RDW 13.8 10/28/2022 03:51 AM     10/28/2022 03:51 AM     02/24/2012 11:00 AM       CMP:   Lab Results   Component Value Date/Time     10/28/2022 03:51 AM    K 4.3 10/28/2022 03:51 AM     10/28/2022 03:51 AM    CO2 22 10/28/2022 03:51 AM    BUN 15 10/28/2022 03:51 AM    CREATININE 1.35 10/28/2022 03:51 AM    GFRAA 59 07/29/2022 10:30 AM    LABGLOM 52 10/28/2022 03:51 AM    GLUCOSE 113 10/28/2022 03:51 AM    GLUCOSE 97 04/24/2012 12:55 PM    PROT 6.6 10/28/2022 03:51 AM    CALCIUM 9.0 10/28/2022 03:51 AM    BILITOT 1.3 10/28/2022 03:51 AM    ALKPHOS 148 10/28/2022 03:51 AM    AST 48 10/28/2022 03:51 AM    ALT 21 10/28/2022 03:51 AM       POC Tests:   Recent Labs     10/27/22  0744   POCGLU 103       Coags:   Lab Results   Component Value Date/Time    PROTIME 10.8 10/26/2022 02:06 AM    INR 1.0 10/26/2022 02:06 AM    APTT 23.9 10/26/2022 12:08 AM       HCG (If Applicable): No results found for: PREGTESTUR, PREGSERUM, HCG, HCGQUANT     ABGs: No results found for: PHART, PO2ART, UOD2YJY, HYA4VHY, BEART, E2BSCIUT     Type & Screen (If Applicable):  No results found for: LABABO, [address]    Drug/Infectious Status (If Applicable):  Lab Results   Component Value Date/Time    HEPCAB NONREACTIVE 07/30/2012 12:10 PM       COVID-19 Screening (If Applicable):   Lab Results   Component Value Date/Time    COVID19 Not Detected 10/25/2022 10:24 PM           Anesthesia Evaluation  Patient summary reviewed and Nursing notes reviewed no history of anesthetic complications:   Airway: Mallampati: IV  TM distance: >3 FB   Neck ROM: limited  Mouth opening: > = 3 FB   Dental:    (+) edentulous      Pulmonary:Negative Pulmonary ROS and normal exam                               Cardiovascular:    (+) hypertension:, angina:, past MI:, CAD:,                   Neuro/Psych:   (+) CVA:, neuromuscular disease: Parkinson's disease,             GI/Hepatic/Renal:   (+) GERD:, renal disease: CRI,           Endo/Other: Negative Endo/Other ROS                     ROS comment: gout Abdominal:             Vascular: Other Findings:           Anesthesia Plan      general     ASA 3       Induction: intravenous. MIPS: Postoperative opioids intended and Prophylactic antiemetics administered. Anesthetic plan and risks discussed with patient. Plan discussed with CRNA.                     Gretchen Barrera MD   10/29/2022

## 2022-10-29 NOTE — PLAN OF CARE
Problem: Skin/Tissue Integrity  Goal: Absence of new skin breakdown  Description: 1. Monitor for areas of redness and/or skin breakdown  2. Assess vascular access sites hourly  3. Every 4-6 hours minimum:  Change oxygen saturation probe site  4. Every 4-6 hours:  If on nasal continuous positive airway pressure, respiratory therapy assess nares and determine need for appliance change or resting period.   10/29/2022 0602 by Kayleigh Mcintyre RN  Outcome: Progressing  10/28/2022 1712 by Danilo Desir RN  Outcome: Progressing     Problem: Safety - Adult  Goal: Free from fall injury  10/29/2022 0602 by Kayleigh Mcintyre RN  Outcome: Progressing  10/28/2022 1712 by Danilo Desir RN  Outcome: Progressing     Problem: ABCDS Injury Assessment  Goal: Absence of physical injury  10/29/2022 0602 by Kayleigh Mcintyre RN  Outcome: Progressing  Flowsheets (Taken 10/28/2022 2000)  Absence of Physical Injury: Implement safety measures based on patient assessment  10/28/2022 1712 by Danilo Desir RN  Outcome: Progressing     Problem: Chronic Conditions and Co-morbidities  Goal: Patient's chronic conditions and co-morbidity symptoms are monitored and maintained or improved  10/29/2022 0602 by Kayleigh Mcintyre RN  Outcome: Progressing  10/28/2022 1712 by Danilo Desir RN  Outcome: Progressing

## 2022-10-29 NOTE — ANESTHESIA POSTPROCEDURE EVALUATION
POST- ANESTHESIA EVALUATION       Pt Name: Haley Salazar  MRN: [id number]  YOB: 1940  Date of evaluation: 10/29/2022  Time:  10:24 AM      /75   Pulse 67   Temp 98.4 °F (36.9 °C) (Oral)   Resp 15   Ht 5' 7\" (1.702 m)   Wt 183 lb 6.8 oz (83.2 kg)   SpO2 97%   BMI 28.73 kg/m²      Consciousness Level  Awake  Cardiopulmonary Status  Stable  Pain Adequately Treated YES  Nausea / Vomiting  NO  Adequate Hydration  YES  Anesthesia Related Complications NONE      Electronically signed by Isaias Raymundo MD on 10/29/2022 at 10:24 AM       Department of Anesthesiology  Postprocedure Note    Patient: Haley Salazar  MRN: [id number]  YOB: 1940  Date of evaluation: 10/29/2022      Procedure Summary     Date: 10/29/22 Room / Location: [facility] 09 / [facility] [place]    Anesthesia Start: 8952 Anesthesia Stop: 2277    Procedure: LAPROSCOPIC CHOLECYSTECTOMY Diagnosis:       Symptomatic cholelithiasis      (SYMPTOMATIC CHOLELITHIASIS)    Surgeons: Marijean Mohs, DO Responsible Provider: Isaias Raymundo MD    Anesthesia Type: General ASA Status: 3          Anesthesia Type: General    Caroline Phase I: Caroline Score: 8    Caroline Phase II:        Anesthesia Post Evaluation

## 2022-10-29 NOTE — OP NOTE
Operative Note      Patient: Jonh Anderson  YOB: 1940  MRN: [id number]    Date of Procedure: 10/29/2022    Pre-Op Diagnosis:  CHOLELITHIASIS/cholecystitis, gallstone pancreatitis    Post-Op Diagnosis: Same       Procedure(s):  LAPROSCOPIC CHOLECYSTECTOMY    Surgeon(s):  Gwyn Dinero DO    Assistant:   Resident: Jad Oliva DO; Tone Turner DO    Anesthesia: General    Abx: Zosyn    Estimated Blood Loss (mL): 23WM    Complications: none    Specimens:   ID Type Source Tests Collected by Time Destination   A : GALLBLADDER AND CONTENTS Tissue Gallbladder SURGICAL PATHOLOGY Gwyn Dienro DO 10/29/2022 3112        Implants:  * No implants in log *      Drains: * No LDAs found *    Findings: WC-III, acute on chronic cholecystitis with significant periportal edema and inflammation    Indication: Patient is an 59-year-old male with symptomatic cholelithiasis/cholecystitis and gallstone pancreatitis. Patient was seen 2 days prior by the surgery team, symptoms have not completely improved by Friday so decision made to pursue laparoscopic cholecystectomy the following day. Patient has history CAD with stents on Plavix, currently Plavix is held and has been held for the past 3 days. Risks and complications of the procedure discussed with the attending surgeon and general surgery team prior to procedure. Informed consent obtained and present in chart. Detailed Description of Procedure:   Patient was taken to the operating room placed on the operating room table in the supine position with arms extended on arm boards, all pressure points padded, lapbelt applied, EPC cuffs on and functioning. Patient was intubated and general anesthesia induced. The patient was then prepped and draped in usual sterile fashion. A timeout was performed ensuring the proper patient, procedure, positioning, acknowledging antibiotics Zosyn administered, noting allergies and consent present in chart.   All present were in agreement. A #11 blade was used to make a transverse infraumbilical incision 5 mm wide through which a Veress needle was inserted and pneumoperitoneum to 15 mmHg obtained. The abdomen was then entered under direct visualization with a 5 mm Optiview trocar at the infraumbilical site. Examination of the abdomen noted no damage to the underlying bowel or mesentery. Using the a #11 blade and under direct visualization 3 additional ports were placed, an 11 mm port right of the xiphoid, and two 5 mm ports in the right lateral hemiabdomen. The gallbladder was identified and noted to be grossly edematous with chronic inflammatory changes. Few omental adhesions teased down bluntly and the fundus of the gallbladder grasped with an atraumatic grasper and the gallbladder retracted cephalad over the liver. The patient was then placed in steep Trendelenburg with left rotation to allow adequate visualization of the infundibulum. The infundibulum was then grasped with a atraumatic grasper and retracted laterally. The paracystic triangle was then bluntly dissected out with a laparoscopic Kitner. Some bleeding was encountered during this dissection due to the acute on chronic inflammation secondary to the pancreatitis. Suction irrigation device was inserted and continued blunt dissection with evacuation of small amount bleeding revealed cystic duct with fibrofatty tissue in the cholecystic triangle and presumed cystic artery posteriorly. Using a right angle Petelin dissector the cystic duct was carefully isolated followed by the cystic artery. The fibrofatty tissues above and below the cystic duct were divided with hook cautery. Gallbladder was then retracted medially and the inferior third of the gallbladder capsule elevated off the cystic plate, this process was then repeated for the medial aspect of the gallbladder.   At this point in time we were able to identify 2 tubular structures heading into the gallbladder with identification of the liver bed deep to the structures. The cystic duct was then doubly clipped distally and single clipped proximally prior to being divided with laparoscopic sage. There was noted to be a single lumen after division and no disruption of the doubly clipped portion. We then doubly clipped the cystic artery and divided between the 2 clips, again noting a single lumen after division with the laparoscopic sage. Hook cautery was then used to elevate to the gallbladder off of the cystic plate, during elevation a cholecystotomy was inadvertently made resulting in small amount of bile spillage, this was evacuated with suction irrigation. Once the gallbladder had been removed from the cystic plate the liver was elevated and hemostasis obtained with combination hook cautery and Surgicel powder. Once hemostasis had been obtained the remaining blood clot and bile was suctioned from Murphy's space. The gallbladder was then placed into an Endo Catch bag and removed through the 11 mm port site. The gallbladder was passed off the specimen. The 11 mm port site fascia was then closed under direct visualization using a SpokenLayer device and #0 Vicryl suture. The fascia at the 11 mm port site was then anesthetized with 0.5% Marcaine under direct visualization. The abdomen was then evacuated pneumoperitoneum, the remaining 5 mm ports removed, and the remaining 0.5% Marcaine injected into the skin around the 4 port sites. The port sites were then closed with 4-0 Monocryl suture in buried deep dermal fashion. The abdomen was then washed, dried, and Steri-Strips applied over the incision sites. This concluded our procedure, the patient was then extubated per anesthesia, transferred back to his hospital bed, and transported to PACU to recovery in stable condition. There was an unplanned perforation of the gallbladder, but otherwise no complications.   The patient was in stable condition upon leaving the operating room. All sponge, needle, and instrument counts were reported as correct at the end of the case. Dr. Carlota Vidal was present scrubbed for the entire procedure. Plan: Continue Zosyn for one dose post op, may begin low fat diet, hold plavix for an additional 48hrs. Electronically signed by Joseph Tan DO on 10/29/2022 at 9:10 AM  I attest that I was present throughout the operation and agree with the description of findings and procedure as dictated above.   Jimmy Holliday MD

## 2022-10-29 NOTE — PLAN OF CARE
Problem: Skin/Tissue Integrity  Goal: Absence of new skin breakdown  Description: 1. Monitor for areas of redness and/or skin breakdown  2. Assess vascular access sites hourly  3. Every 4-6 hours minimum:  Change oxygen saturation probe site  4. Every 4-6 hours:  If on nasal continuous positive airway pressure, respiratory therapy assess nares and determine need for appliance change or resting period.   10/29/2022 1621 by Jackie Ramos RN  Outcome: Progressing  10/29/2022 0602 by Dolores Aldana RN  Outcome: Progressing     Problem: Safety - Adult  Goal: Free from fall injury  10/29/2022 1621 by Jackie Ramos RN  Outcome: Progressing  10/29/2022 0602 by Dolores Aldana RN  Outcome: Progressing     Problem: ABCDS Injury Assessment  Goal: Absence of physical injury  10/29/2022 1621 by Jackie Ramos RN  Outcome: Progressing  10/29/2022 0602 by Dolores Aldana RN  Outcome: Progressing  Flowsheets (Taken 10/28/2022 2000)  Absence of Physical Injury: Implement safety measures based on patient assessment     Problem: Chronic Conditions and Co-morbidities  Goal: Patient's chronic conditions and co-morbidity symptoms are monitored and maintained or improved  10/29/2022 1621 by Jackie Ramos RN  Outcome: Progressing  10/29/2022 0602 by Dolores Aldana RN  Outcome: Progressing     Problem: Discharge Planning  Goal: Discharge to home or other facility with appropriate resources  Outcome: Progressing     Problem: Pain  Goal: Verbalizes/displays adequate comfort level or baseline comfort level  Outcome: Progressing

## 2022-10-29 NOTE — PROGRESS NOTES
[facility] - Progress Note    Today's Date and Time: 10/29/2022, 1:03 PM    Impression :   Acute cholecystitis  No biliary or pancreatic ductal dilation or choledocholithiasis on MRCP  Cholelithiasis  S/P laparoscopic cholecystectomy 10-29-22  Pulmonary nodule  Transaminitis  Leukocytosis  TAMARA-resolved  Hx Parkinson's  Hx MRSA on leg wound 2015    Recommendations:   Continue IV Zosyn for cholecystitis   Closely monitor patient's progress  Follow-up on culture results    Medical Decision Making/Summary/Discussion:10/29/2022     Patient with acute cholelithiasis and cholecystitis  MRCP completed with no biliary or pancreatic ductal dilation or choledocholithiasis. Infection Control Recommendations   [place] Precautions    Antimicrobial Stewardship Recommendations   Discontinuation of therapy    Coordination of Outpatient Care:   Estimated Length of IV antimicrobials:TBD  Patient will need Midline Catheter Insertion: TBD  Patient will need PICC line Insertion:BD  Patient will need: Home IV , [place],  SNF,  LTAC: TBD  Patient will need outpatient wound care:    Chief complaint/reason for consultation:   Antibiotic recommendations for acute cholecystitis    History of Present Illness:   Jameel Alexis is a 79y.o.-year-old male who was initially admitted on 10/25/2022. Patient seen at the request of Dr. Nathen Dickerson. INITIAL HISTORY:    This patient presented to the ED on 10/25/22 with complaints of generalized weakness as well as abdominal pain and 3-4 episodes of emesis over the past couple days. Lab work revealed elevated AST/ALT, bilirubin, lipase and alk phos. A CT abdomen revealed acute cholecystitis with cholelithiasis. A CT chest was also completed that showed a 10 mm spiculated nodule in the right lung base. General surgery was consulted and an MRCP was ordered.    MRCP findings include cholelithiasis with acute cholecystitis  No biliary or pancreatic ductal dilation and no evidence of choledocholithiasis. The patient received one dose of Zosyn. On 10/26/22    IMAGING:  CT ABDOMEN PELVIS WO CONTRAST Additional Contrast? None     Result Date: 10/26/2022  Cholelithiasis and acute cholecystitis. Gallbladder ultrasound would be helpful for further characterization. 10 mm spiculated nodule right lung base. RECOMMENDATIONS: Guidelines for follow-up and management of pulmonary nodules found on abdomen CT: >8mm - immediate chest CT for further evaluation. Radiology 2017 http://pubs. rsna.org/doi/full/10.1148/radiol. [id number]      XR CHEST (2 VW)     Result Date: 10/25/2022  No acute process. MRCP 10/26/22  Impression   Cholelithiasis with findings suggesting acute cholecystitis. No biliary or pancreatic ductal dilatation. No evidence of   choledocholithiasis. Mild hepatic steatosis. CURRENT EVALUATION 10/29/2022  /69   Pulse 61   Temp 97.7 °F (36.5 °C) (Oral)   Resp 18   Ht 5' 7\" (1.702 m)   Wt 183 lb 6.8 oz (83.2 kg)   SpO2 100%   BMI 28.73 kg/m²     Afebrile  VS stable    Patient feels better  Underwent laparoscopic cholecystectomy 10-29-22 with findings of acute on chronic cholecystitis with significant periportal edema and inflammation  No complaints  No new issues per RN      Zosyn continues. Tolerating well. AST, ALT, ALP, Bilirubin, and Lipase are trending downwards    Labs, X rays reviewed: 10/29/2022    BUN: 22-->21-->15  Cr: 1.71-->1.42-->1.35    WBC: 13.4-->8.8-->7.7  Hb: 15.1-->13-->13.3  Plat: 204-->145-->158    ALT: 363-->70-->21  AST: 427-->83-->48  Alk phos: 230-->143-->148  Bili: 3.1-->2.1-->1. 3  Lipase: 845-->16-->15    CRP: 45.4    Cultures:  Urine:  10/25/22: No significant growth  Blood:  10/26/22: No growth  Sputum :    Wound:    MRSA Nares:    Imaging:  CT Chest 10/25/22      CT abd/pelvis 10/25/22      Discussed with patient, RN, family.     I have personally reviewed the past medical history, past surgical history, medications, social history, and family history, and I have updated the database accordingly.   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 ([facility] 3/18/2011    d/t being hurt on the job    CKD (chronic kidney disease) stage 2, GFR 60-89 ml/min     CKD (chronic kidney disease) stage 3, GFR 30-59 ml/min ([facility])     Diverticulosis of colon     GERD (gastroesophageal reflux disease)     History of non-ST elevation myocardial infarction (NSTEMI) 6/2022 10/27/2022    Impaired renal function     MRSA (methicillin resistant staph aureus) culture positive 9/23/2015    leg    Osteoarthritis of knee     Left    Parkinson disease ([facility] [address]     Proteinuria     Skull fracture ([facility] [address] 3/18/2011    d/t 12-foot drop on the job    Temporary loss of eyesight     periodically, happened x7    Tubular adenoma of colon 2012, 2017    mulitple     Past Surgical  History:     Past Surgical History:   Procedure Laterality Date    CHOLECYSTECTOMY, LAPAROSCOPIC  10/29/2022    LAPROSCOPIC CHOLECYSTECTOMY, POSSIBLE OPEN    COLONOSCOPY  11/02/2012    poor prep, tubular adenoma    COLONOSCOPY  09/19/2017    diverticulosis, multiple polyps as described, spot marking done, pathology-tubular adenoma x 4    PA COLSC FLX W/RMVL OF TUMOR POLYP LESION SNARE TQ N/A 09/19/2017    COLONOSCOPY POLYPECTOMY SNARE/COLD BIOPSY with tatooing and apc transverse colon performed by Arti Love MD at [id number] [facility] Right     rotator cuff     Medications:      sodium chloride flush  5-40 mL IntraVENous 2 times per day    metoprolol tartrate  12.5 mg Oral BID    pantoprazole (PROTONIX) 40 mg injection  40 mg IntraVENous Daily    allopurinol  100 mg Oral Daily    amitriptyline  10 mg Oral Nightly    carbidopa-levodopa  1 tablet Oral BID    entacapone  200 mg Oral BID    gabapentin  100 mg Oral TID    rOPINIRole  0.25 mg Oral Nightly    magnesium oxide  400 mg Oral Daily piperacillin-tazobactam  3,375 mg IntraVENous Q8H     Social History:     Social History     Socioeconomic History    Marital status: Single     Spouse name: Not on file    Number of children: Not on file    Years of education: Not on file    Highest education level: Not on file   Occupational History    Not on file   Tobacco Use    Smoking status: Former    Smokeless tobacco: Never   Vaping Use    Vaping Use: Never used   Substance and Sexual Activity    Alcohol use: No    Drug use: No    Sexual activity: Not Currently   Other Topics Concern    Not on file   Social History Narrative    Not on file     Social Determinants of Health     Financial Resource Strain: Low Risk     Difficulty of Paying Living Expenses: Not hard at all   Food Insecurity: No Food Insecurity    Worried About Running Out of Food in the Last Year: Never true    [address] in the Last Year: Never true   Transportation Needs: No Transportation Needs    Lack of Transportation (Medical): No    Lack of Transportation (Non-Medical): No   Physical Activity: Inactive    Days of Exercise per Week: 0 days    Minutes of Exercise per Session: 0 min   Stress: Stress Concern Present    Feeling of Stress : To some extent   Social Connections: Socially Isolated    Frequency of Communication with Friends and Family: Three times a week    Frequency of Social Gatherings with Friends and Family:  Three times a week    Attends [employer] Services: Never    Active Member of Clubs or Organizations: No    Attends Club or Organization Meetings: Never    Marital Status:    Intimate Partner Violence: Not At Risk    Fear of Current or Ex-Partner: No    Emotionally Abused: No    Physically Abused: No    Sexually Abused: No   Housing Stability: Low Risk     Unable to Pay for Housing in the Last Year: No    Number of Places Lived in the Last Year: 1    Unstable Housing in the Last Year: No     Family History:     Family History   Problem Relation Age of Onset No Known Problems Mother     No Known Problems Father       Allergies:   Dye [iodides] and Aspirin     Review of Systems:   Constitutional: No fevers or chills. No systemic complaints  Head: No headaches  Eyes: No double vision or blurry vision. No conjunctival inflammation. ENT: No sore throat or runny nose. . No hearing loss, tinnitus or vertigo. Cardiovascular: No chest pain or palpitations. No shortness of breath. No METZ  Lung: No shortness of breath or cough. No sputum production  Abdomen: No nausea, vomiting, diarrhea, or abdominal pain. Bakari Nancy No cramps. Genitourinary: No increased urinary frequency, or dysuria. No hematuria. No suprapubic or CVA pain  Musculoskeletal: No muscle aches or pains. No joint effusions, swelling or deformities  Hematologic: No bleeding or bruising. Neurologic: No headache, weakness, numbness, or tingling. Integument: No rash, no ulcers. Psychiatric: No depression. Endocrine: No polyuria, no polydipsia, no polyphagia. Physical Examination :   Patient Vitals for the past 8 hrs:   BP Temp Temp src Pulse Resp SpO2   10/29/22 1146 133/69 97.7 °F (36.5 °C) Oral 61 18 100 %   10/29/22 1013 139/75 98.4 °F (36.9 °C) Oral 67 15 97 %   10/29/22 0945 135/70 -- -- 69 18 97 %   10/29/22 0930 (!) 145/82 97.2 °F (36.2 °C) -- 70 23 95 %   10/29/22 0915 (!) 146/77 -- -- 80 27 95 %   10/29/22 0909 (!) 152/76 97.4 °F (36.3 °C) Temporal 75 18 95 %   10/29/22 0703 122/73 97.8 °F (36.6 °C) Temporal 64 16 97 %     General Appearance: Awake, alert, and in no apparent distress  Head:  Normocephalic, no trauma  Eyes: Pupils equal, round, reactive to light and accommodation; extraocular movements intact; sclera anicteric; conjunctivae pink. No embolic phenomena. ENT: Oropharynx clear, without erythema, exudate, or thrush. No tenderness of sinuses. Mouth/throat: mucosa pink and moist. No lesions. Dentition in good repair. Neck:Supple, without lymphadenopathy. Thyroid normal, No bruits.   Pulmonary/Chest: Clear to auscultation, without wheezes, rales, or rhonchi. No dullness to percussion. Cardiovascular: Regular rate and rhythm without murmurs, rubs, or gallops. Abdomen: Soft, non tender. Bowel sounds normal. No organomegaly  All four Extremities: No cyanosis, clubbing, edema, or effusions. Neurologic: No gross sensory or motor deficits. Skin: Warm and dry with good turgor. No signs of peripheral arterial or venous insufficiency. No ulcerations. No open wounds. Medical Decision Making -Laboratory:   I have independently reviewed/ordered the following labs:    CBC with Differential:   Recent Labs     10/28/22  0351 10/29/22  1039   WBC 7.7 8.9   HGB 13.3 14.7   HCT 40.2* 44.5    163   LYMPHOPCT 10* 10*   MONOPCT 6 4     BMP:   Recent Labs     10/28/22  0351 10/29/22  1039    135   K 4.3 4.3    103   CO2 22 20   BUN 15 12   CREATININE 1.35* 1.42*     Hepatic Function Panel:   Recent Labs     10/26/22  1837 10/27/22  0608 10/28/22  0351 10/29/22  1039   PROT 6.5   < > 6.6 7.0   LABALBU 3.4*   < > 3.3* 3.3*   BILIDIR 2.3*  --   --   --    IBILI 0.8  --   --   --    BILITOT 3.1*   < > 1.3* 1.2   ALKPHOS 152*   < > 148* 155*   *   < > 21 74*   *   < > 48* 84*    < > = values in this interval not displayed. No results for input(s): RPR in the last 72 hours. No results for input(s): HIV in the last 72 hours. No results for input(s): BC in the last 72 hours.   Lab Results   Component Value Date/Time    MUCUS NOT REPORTED 02/16/2022 12:09 PM    RBC 4.65 10/29/2022 10:39 AM    RBC 5.14 02/24/2012 11:00 AM    TRICHOMONAS NOT REPORTED 02/16/2022 12:09 PM    WBC 8.9 10/29/2022 10:39 AM    YEAST NOT REPORTED 02/16/2022 12:09 PM    TURBIDITY Clear 10/25/2022 10:25 PM     Lab Results   Component Value Date/Time    CREATININE 1.42 10/29/2022 10:39 AM    GLUCOSE 155 10/29/2022 10:39 AM    GLUCOSE 97 04/24/2012 12:55 PM     Medical Decision Making-Imaging:     Narrative   EXAMINATION:   CT OF THE CHEST WITHOUT CONTRAST 10/26/2022 1:29 am       TECHNIQUE:   CT of the chest was performed without the administration of intravenous   contrast. Multiplanar reformatted images are provided for review. Automated   exposure control, iterative reconstruction, and/or weight based adjustment of   the mA/kV was utilized to reduce the radiation dose to as low as reasonably   achievable. COMPARISON:   CT abdomen and pelvis the previous day, CT chest on 06/27/2022       HISTORY:   ORDERING SYSTEM PROVIDED HISTORY: eval mass   TECHNOLOGIST PROVIDED HISTORY:   eval mass   Decision Support Exception - unselect if not a suspected or confirmed   emergency medical condition->Emergency Medical Condition (MA)       FINDINGS:   Mediastinum: No cardiomegaly is identified. No focal esophageal thickening   is seen. Small hiatal hernia. No mediastinal lymphadenopathy is identified. Calcified lymph nodes are noted. No thyroid mass. No pericardial effusion. Coronary artery stent is noted. Lungs/pleura: Respiratory motion artifact, however there are numerous areas   of mild peribronchial nodularity seen within the right lower lobe, most of   which demonstrate small punctate benign-appearing calcifications, and   findings felt likely related to post inflammatory/infectious changes. The   described spiculated nodule on the comparison CT abdomen demonstrates a   calcification which is more conspicuous on this study, and felt very unlikely   to represent a neoplastic process. Upper Abdomen: Cholelithiasis with potential cholecystitis noted, partially   imaged as previously discussed. Soft Tissues/Bones: No axillary or supraclavicular lymphadenopathy is   identified. No acute osseous abnormality. No acute vertebral body fracture   is identified. No osseous destructive process is identified. Impression   1.  The spiculated 11 mm nodule described on the CT abdomen study demonstrates   a more conspicuous central calcification, with multiple similar appearing   areas of peribronchial nodules with calcifications seen in the right lower   lobe, felt likely related to sequela of prior inflammatory/infectious   process, and very unlikely to represent a neoplastic process. No significant   change from June of 2022. Given the degree of clinical concern of these   findings, and follow-up recommendations as below, suggest repeat imaging in   approximately 12 months. 2. Minimal dependent atelectasis. 3. Other incidental findings as above. RECOMMENDATIONS:   Fleischner Society guidelines for follow-up and management of incidentally   detected pulmonary nodules:       Multiple Solid Nodules:       Nodule size greater than 8 mm   In a low-risk patient, CT at 3-6 months, then consider CT at 18-24 months. In a high-risk patient, CT at 3-6 months, then CT at 18-24 months. - Low risk patients include individuals with minimal or absent history of   smoking and other known risk factors. - High risk patients include individuals with a history or smoking or known   risk factors. Radiology 2017 http://pubs. rsna.org/doi/full/10.1148/radiol. 8609648970         Narrative   EXAMINATION:   CT OF THE ABDOMEN AND PELVIS WITHOUT CONTRAST 10/25/2022 11:38 pm       TECHNIQUE:   CT of the abdomen and pelvis was performed without the administration of   intravenous contrast. Multiplanar reformatted images are provided for review. Automated exposure control, iterative reconstruction, and/or weight based   adjustment of the mA/kV was utilized to reduce the radiation dose to as low   as reasonably achievable. COMPARISON:   None.        HISTORY:   ORDERING SYSTEM PROVIDED HISTORY: eval for gallstone pancreatitis   TECHNOLOGIST PROVIDED HISTORY:   eval for gallstone pancreatitis       Decision Support Exception - unselect if not a suspected or confirmed   emergency medical condition->Emergency Medical Condition (MA)   Reason for Exam: eval for gallstone pancreatitis       FINDINGS:   Lower Chest: Lad stent/coronary artery disease. 10 mm spiculated nodule   right lung base. Organs: Liver is hypodense. 15 mm lamellated gallstone noted. Gallbladder   wall thickening noted. Adrenals and pancreas normal.       GI/Bowel: Small hiatal hernia. Small large bowel normal.  Appendix normal.       Pelvis: Fat containing bilateral inguinal hernias. Peritoneum/Retroperitoneum: Calcified plaque along the aorta and its branches. Bones/Soft Tissues: Spondylosis and multilevel vacuum disc phenomena. Slight   anterior subluxation L4-5. Impression   Cholelithiasis and acute cholecystitis. Gallbladder ultrasound would be   helpful for further characterization. 10 mm spiculated nodule right lung base. RECOMMENDATIONS:   Guidelines for follow-up and management of pulmonary nodules found on abdomen   CT:       >8mm - immediate chest CT for further evaluation. Radiology 2017 http://pubs. rsna.org/doi/full/10.1148/radiol. [id number]     Narrative   EXAMINATION:   MRI OF THE ABDOMEN WITHOUT CONTRAST AND MRCP 10/26/2022 9:47 am       TECHNIQUE:   Multiplanar multisequence MRI of the abdomen was performed without the   administration of intravenous contrast.  After initial T2 axial and coronal   images, thick slab, thin slab and 3D coronal MRCP sequences were obtained   without the administration of intravenous contrast.  MIP images are provided   for review. COMPARISON:   10/25/2022 CT abdomen/pelvis       HISTORY:   ORDERING SYSTEM PROVIDED HISTORY: cholelithiasis elevated lft's   ,?choledocholithiasis   TECHNOLOGIST PROVIDED HISTORY:   cholelithiasis elevated lft's ,?choledocholithiasis   What is the sedation requirement?->None       FINDINGS:   Gallbladder: There is cholelithiasis with a large stone measuring   approximately 19 mm in size.   There is gallbladder wall thickening measuring   approximately 7 mm in thickness. There is also pericholecystic fluid. Findings can be seen with acute cholecystitis in the proper clinical setting. Bile Ducts: There is no evidence of common bile duct dilatation. Common bile   duct measures approximately 4 mm in diameter. No intrahepatic or   extrahepatic biliary ductal dilatation. No evidence of choledocholithiasis. No evidence of common bile duct stricture or obstruction. Pancreatic Duct: No pancreatic ductal dilatation or obstruction. No   pancreatic ductal stones. No evidence of pancreatic divisum. Other:  Examination not tailored for the evaluation of the solid abdominal   organs. There is mild diffuse hepatic steatosis. No evidence of   hydronephrosis. No evidence of abdominal ascites or lymphadenopathy. Visualized bowel is grossly unremarkable. Osseous structures demonstrate no   acute abnormality. Impression   Cholelithiasis with findings suggesting acute cholecystitis. No biliary or pancreatic ductal dilatation. No evidence of   choledocholithiasis. Mild hepatic steatosis. Narrative   EXAMINATION:   TWO XRAY VIEWS OF THE CHEST       10/25/2022 9:44 pm       COMPARISON:   July 19, 2022       HISTORY:   ORDERING SYSTEM PROVIDED HISTORY: eval for pneumonia   TECHNOLOGIST PROVIDED HISTORY:   eval for pneumonia       FINDINGS:   The lungs are without acute focal process. There is no effusion or   pneumothorax. The cardiomediastinal silhouette is without acute process. The   osseous structures are without acute process. Impression   No acute process.      Medical Decision Diwirs-Cogffkbp-Iulex:     Medical Decision Making-Other:   Note:  Labs, medications, radiologic studies were reviewed with personal review of films  Large amounts of data were reviewed  Discussed with nursing Staff, Discharge planner  Infection Control and Prevention measures reviewed  All prior entries were reviewed  Administer medications as ordered  Prognosis: Fair  Discharge planning reviewed    Thank you for allowing us to participate in the care of this patient. Please call with questions.     Enid Nyhan, MD        Pager: [phone] - Office: [phone]

## 2022-10-29 NOTE — PROGRESS NOTES
General Surgery:  Daily Progress Note                    PATIENT NAME: Sari Helm     TODAY'S DATE: 10/29/2022, 7:13 AM  CC:  abdominal pain    SUBJECTIVE:     Pt seen and examined at bedside. No acute events overnight, vitals signs normal    Pt states that he slept well last night. Still has pain located around the umbilicus. Denies fever, chills, nausea, vomiting and SOB. Was NPO at midnight. Activity: pt did not ambulate yesterday. Pt reports no BM since admission. OBJECTIVE:   VITALS:  /73   Pulse 64   Temp 97.8 °F (36.6 °C) (Temporal)   Resp 16   Ht 5' 7\" (1.702 m)   Wt 183 lb 6.8 oz (83.2 kg)   SpO2 97%   BMI 28.73 kg/m²      INTAKE/OUTPUT:      Intake/Output Summary (Last 24 hours) at 10/29/2022 7650  Last data filed at 10/29/2022 9971  Gross per 24 hour   Intake --   Output 1300 ml   Net -1300 ml         PHYSICAL EXAM:  General Appearance: well developed, well nourished, in no acute distress, and alert  HEENT:  Normocephalic, atraumatic, mucus membranes moist   Heart: Heart sounds are normal.  Regular rate and rhythm without murmur, gallop or rub. Lungs: Normal expansion. Clear to auscultation. No rales, rhonchi, or wheezing. Abdomen: Bowel sounds present; soft and non-distended; tender to palpation in RUQ  Extremities: No cyanosis, pitting edema, rashes noted. Skin: Skin color, texture, turgor normal. No rashes or lesions.     IV Access:  PIV left forearm      Data:   Latest Reference Range & Units 10/28/22 03:51   Sodium 135 - 144 mmol/L 135   Potassium 3.7 - 5.3 mmol/L 4.3   Chloride 98 - 107 mmol/L 101   CO2 20 - 31 mmol/L 22   BUN,BUNPL 8 - 23 mg/dL 15   Creatinine 0.70 - 1.20 mg/dL 1.35 (H)   Anion Gap 9 - 17 mmol/L 12   Calcium, Ionized 1.13 - 1.33 mmol/L 1.15   Est, Glom Filt Rate >60 mL/min/1.73m2 52 (L)   Glucose, Random 70 - 99 mg/dL 113 (H)   CALCIUM, SERUM, 157662 8.6 - 10.4 mg/dL 9.0   ALBUMIN/GLOBULIN RATIO 1.0 - 2.5  1.0   Total Protein 6.4 - 8.3 g/dL 6.6 Albumin 3.5 - 5.2 g/dL 3.3 (L)   Alk Phos 40 - 129 U/L 148 (H)   ALT 5 - 41 U/L 21   AST <40 U/L 48 (H)   Bilirubin 0.3 - 1.2 mg/dL 1.3 (H)   Lipase 13 - 60 U/L 15   WBC 3.5 - 11.3 k/uL 7.7   RBC 4.21 - 5.77 m/uL 4.27   Hemoglobin Quant 13.0 - 17.0 g/dL 13.3   Hematocrit 40.7 - 50.3 % 40.2 (L)   MCV 82.6 - 102.9 fL 94.1   MCH 25.2 - 33.5 pg 31.1   MCHC 28.4 - 34.8 g/dL 33.1   MPV 8.1 - 13.5 fL 9.7   RDW 11.8 - 14.4 % 13.8   Platelet Count 572 - 453 k/uL 158   Absolute Mono # 0.10 - 1.20 k/uL 0.45   Eosinophils % 1 - 4 % 3   Basophils Absolute 0.00 - 0.20 k/uL <0.03   Seg Neutrophils 36 - 65 % 80 (H)   Segs Absolute 1.50 - 8.10 k/uL 6.25   Lymphocytes 24 - 43 % 10 (L)   Absolute Lymph # 1.10 - 3.70 k/uL 0.73 (L)   Monocytes 3 - 12 % 6   Absolute Eos # 0.00 - 0.44 k/uL 0.22   Basophils 0 - 2 % 0   Immature Granulocytes 0 % 1 (H)   Absolute Immature Granulocyte 0.00 - 0.30 k/uL 0.05   NRBC Automated 0.0 per 100 WBC 0.0   (H): Data is abnormally high  (L): Data is abnormally low        Radiology Review:  no new imaging    ASSESSMENT:  Active Hospital Problems    Diagnosis Date Noted    TAMARA (acute kidney injury) (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] 10/26/2022     Priority: Medium    Acute cholecystitis [K81.0] 10/26/2022     Priority: Medium    Coronary artery disease involving native heart with angina pectoris ([facility] [I25.119]      Priority: Medium    Stage 3b chronic kidney disease (CKD) ([facility] [N18.32] 04/20/2022     Priority: Medium    Parkinson disease ([facility] Masha Hess 01/16/2020 Pulmonary hypertension, mild (HCC) [I27.20] 08/31/2017    Essential hypertension [I10] 03/23/2015    CKD (chronic kidney disease) stage 3, GFR 30-59 ml/min ([facility] [N18.30] 03/04/2014    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 male with abdominal pain and vomiting. MRCP significant for cholecystitis no choledocholithiasis. Improving LFTs and lipase.       Plan:  Diet:  NPO midnight for OR  Analgesia:  morphine and tylenol  GI prophylaxis: pantoprazole  DVT prophylaxis:  IPC  AC: continue to hold Plavix until Monday, 10/31  Activity:  Continue to encourage ambulation/activity w/ PT/OT  Continue to encourage incentive spirometry  ID: [email or web address]  Labs: continue to trend alk phos, bilirubin, lipase and ALT/AST   Plan for lap cholecystectomy today    Electronically signed by Antonio Ruiz DO  on 10/29/2022 at 7:13 AM

## 2022-10-30 PROBLEM — Z95.5 HISTORY OF CORONARY ARTERY STENT PLACEMENT: Status: ACTIVE | Noted: 2022-10-30

## 2022-10-30 LAB
ANION GAP SERPL CALCULATED.3IONS-SCNC: 11 MMOL/L (ref 9–17)
BUN BLDV-MCNC: 13 MG/DL (ref 8–23)
CALCIUM SERPL-MCNC: 9 MG/DL (ref 8.6–10.4)
CHLORIDE BLD-SCNC: 100 MMOL/L (ref 98–107)
CO2: 19 MMOL/L (ref 20–31)
CREAT SERPL-MCNC: 1.47 MG/DL (ref 0.7–1.2)
GFR SERPL CREATININE-BSD FRML MDRD: 47 ML/MIN/1.73M2
GLUCOSE BLD-MCNC: 151 MG/DL (ref 70–99)
HCT VFR BLD CALC: 38.5 % (ref 40.7–50.3)
HEMOGLOBIN: 12.6 G/DL (ref 13–17)
LIPASE: 12 U/L (ref 13–60)
MCH RBC QN AUTO: 32.1 PG (ref 25.2–33.5)
MCHC RBC AUTO-ENTMCNC: 32.7 G/DL (ref 28.4–34.8)
MCV RBC AUTO: 98 FL (ref 82.6–102.9)
NRBC AUTOMATED: 0 PER 100 WBC
PDW BLD-RTO: 13.8 % (ref 11.8–14.4)
PLATELET # BLD: 152 K/UL (ref 138–453)
PMV BLD AUTO: 10 FL (ref 8.1–13.5)
POTASSIUM SERPL-SCNC: 4.2 MMOL/L (ref 3.7–5.3)
RBC # BLD: 3.93 M/UL (ref 4.21–5.77)
SODIUM BLD-SCNC: 130 MMOL/L (ref 135–144)
WBC # BLD: 10 K/UL (ref 3.5–11.3)

## 2022-10-30 PROCEDURE — 2700000000 HC OXYGEN THERAPY PER DAY

## 2022-10-30 PROCEDURE — 97530 THERAPEUTIC ACTIVITIES: CPT

## 2022-10-30 PROCEDURE — 99232 SBSQ HOSP IP/OBS MODERATE 35: CPT | Performed by: INTERNAL MEDICINE

## 2022-10-30 PROCEDURE — 51798 US URINE CAPACITY MEASURE: CPT

## 2022-10-30 PROCEDURE — 2580000003 HC RX 258

## 2022-10-30 PROCEDURE — 6370000000 HC RX 637 (ALT 250 FOR IP)

## 2022-10-30 PROCEDURE — 6370000000 HC RX 637 (ALT 250 FOR IP): Performed by: INTERNAL MEDICINE

## 2022-10-30 PROCEDURE — 85027 COMPLETE CBC AUTOMATED: CPT

## 2022-10-30 PROCEDURE — 36415 COLL VENOUS BLD VENIPUNCTURE: CPT

## 2022-10-30 PROCEDURE — 1200000000 HC SEMI PRIVATE

## 2022-10-30 PROCEDURE — 6370000000 HC RX 637 (ALT 250 FOR IP): Performed by: NURSE PRACTITIONER

## 2022-10-30 PROCEDURE — 6360000002 HC RX W HCPCS

## 2022-10-30 PROCEDURE — 80048 BASIC METABOLIC PNL TOTAL CA: CPT

## 2022-10-30 PROCEDURE — 94761 N-INVAS EAR/PLS OXIMETRY MLT: CPT

## 2022-10-30 PROCEDURE — 51701 INSERT BLADDER CATHETER: CPT

## 2022-10-30 PROCEDURE — C9113 INJ PANTOPRAZOLE SODIUM, VIA: HCPCS

## 2022-10-30 PROCEDURE — 83690 ASSAY OF LIPASE: CPT

## 2022-10-30 PROCEDURE — 97535 SELF CARE MNGMENT TRAINING: CPT

## 2022-10-30 RX ORDER — OXYCODONE HYDROCHLORIDE 5 MG/1
5 TABLET ORAL EVERY 6 HOURS PRN
Qty: 12 TABLET | Refills: 0 | Status: SHIPPED | OUTPATIENT
Start: 2022-10-30 | End: 2022-11-02

## 2022-10-30 RX ORDER — TAMSULOSIN HYDROCHLORIDE 0.4 MG/1
0.4 CAPSULE ORAL DAILY
Qty: 30 CAPSULE | Refills: 3 | Status: ON HOLD | OUTPATIENT
Start: 2022-10-31

## 2022-10-30 RX ADMIN — TAMSULOSIN HYDROCHLORIDE 0.4 MG: 0.4 CAPSULE ORAL at 09:04

## 2022-10-30 RX ADMIN — OXYCODONE 5 MG: 5 TABLET ORAL at 09:04

## 2022-10-30 RX ADMIN — OXYCODONE 5 MG: 5 TABLET ORAL at 02:01

## 2022-10-30 RX ADMIN — AMITRIPTYLINE HYDROCHLORIDE 10 MG: 10 TABLET, FILM COATED ORAL at 20:44

## 2022-10-30 RX ADMIN — ENTACAPONE 200 MG: 200 TABLET, FILM COATED ORAL at 09:04

## 2022-10-30 RX ADMIN — ACETAMINOPHEN 650 MG: 325 TABLET ORAL at 17:23

## 2022-10-30 RX ADMIN — GABAPENTIN 100 MG: 100 CAPSULE ORAL at 09:04

## 2022-10-30 RX ADMIN — GABAPENTIN 100 MG: 100 CAPSULE ORAL at 20:44

## 2022-10-30 RX ADMIN — ROPINIROLE HYDROCHLORIDE 0.25 MG: 0.25 TABLET, FILM COATED ORAL at 20:44

## 2022-10-30 RX ADMIN — ALLOPURINOL 100 MG: 100 TABLET ORAL at 09:03

## 2022-10-30 RX ADMIN — OXYCODONE 5 MG: 5 TABLET ORAL at 20:44

## 2022-10-30 RX ADMIN — METOPROLOL TARTRATE 12.5 MG: 25 TABLET ORAL at 09:05

## 2022-10-30 RX ADMIN — SODIUM CHLORIDE, PRESERVATIVE FREE 40 MG: 5 INJECTION INTRAVENOUS at 09:05

## 2022-10-30 RX ADMIN — GABAPENTIN 100 MG: 100 CAPSULE ORAL at 13:37

## 2022-10-30 RX ADMIN — ENTACAPONE 200 MG: 200 TABLET, FILM COATED ORAL at 20:44

## 2022-10-30 RX ADMIN — GUAIFENESIN 200 MG: 200 SOLUTION ORAL at 16:23

## 2022-10-30 RX ADMIN — CARBIDOPA AND LEVODOPA 1 TABLET: 25; 100 TABLET ORAL at 09:04

## 2022-10-30 RX ADMIN — CARBIDOPA AND LEVODOPA 1 TABLET: 25; 100 TABLET ORAL at 20:44

## 2022-10-30 RX ADMIN — SODIUM CHLORIDE, PRESERVATIVE FREE 10 ML: 5 INJECTION INTRAVENOUS at 20:45

## 2022-10-30 RX ADMIN — SODIUM CHLORIDE, PRESERVATIVE FREE 10 ML: 5 INJECTION INTRAVENOUS at 09:18

## 2022-10-30 RX ADMIN — GUAIFENESIN 200 MG: 200 SOLUTION ORAL at 01:35

## 2022-10-30 RX ADMIN — MAGNESIUM GLUCONATE 500 MG ORAL TABLET 400 MG: 500 TABLET ORAL at 09:04

## 2022-10-30 ASSESSMENT — ENCOUNTER SYMPTOMS
ABDOMINAL PAIN: 1
NAUSEA: 0
COUGH: 0
SHORTNESS OF BREATH: 0
VOMITING: 0

## 2022-10-30 ASSESSMENT — PAIN DESCRIPTION - PAIN TYPE: TYPE: SURGICAL PAIN

## 2022-10-30 ASSESSMENT — PAIN SCALES - GENERAL
PAINLEVEL_OUTOF10: 0
PAINLEVEL_OUTOF10: 6
PAINLEVEL_OUTOF10: 3
PAINLEVEL_OUTOF10: 7
PAINLEVEL_OUTOF10: 0
PAINLEVEL_OUTOF10: 7

## 2022-10-30 ASSESSMENT — PAIN DESCRIPTION - LOCATION
LOCATION: ABDOMEN
LOCATION: ABDOMEN

## 2022-10-30 ASSESSMENT — PAIN DESCRIPTION - DESCRIPTORS: DESCRIPTORS: ACHING;DISCOMFORT

## 2022-10-30 ASSESSMENT — PAIN - FUNCTIONAL ASSESSMENT: PAIN_FUNCTIONAL_ASSESSMENT: PREVENTS OR INTERFERES SOME ACTIVE ACTIVITIES AND ADLS

## 2022-10-30 ASSESSMENT — PAIN DESCRIPTION - ONSET: ONSET: ON-GOING

## 2022-10-30 ASSESSMENT — PAIN DESCRIPTION - ORIENTATION: ORIENTATION: RIGHT;LEFT;MID

## 2022-10-30 ASSESSMENT — PAIN DESCRIPTION - FREQUENCY: FREQUENCY: CONTINUOUS

## 2022-10-30 NOTE — PROGRESS NOTES
General Surgery:  Daily Progress Note                    PATIENT NAME: Sandy Navarro     TODAY'S DATE: 10/30/2022, 6:14 AM  CC:  abdominal pain    SUBJECTIVE:     Pt seen and examined at bedside. No acute events overnight, vitals signs normal. Tolerating regular diet. Has passed flatus. Patient had an episode of urinary retention overnight. He was bladder scanned and found to have 800mL, but only 230mL was eliminated with straight cath. Post void residual 500mL. Primary team added Flomax overnight. Denies fever, chills, nausea, vomiting and SOB. Activity: pt did not ambulate yesterday. Pt reports no BM since admission. OBJECTIVE:   VITALS:  /68   Pulse 60   Temp 97.9 °F (36.6 °C) (Oral)   Resp 16   Ht 5' 7\" (1.702 m)   Wt 177 lb 14.6 oz (80.7 kg)   SpO2 96%   BMI 27.86 kg/m²      INTAKE/OUTPUT:      Intake/Output Summary (Last 24 hours) at 10/30/2022 2924  Last data filed at 10/30/2022 0414  Gross per 24 hour   Intake 400 ml   Output 3302 ml   Net -2902 ml       PHYSICAL EXAM:  General Appearance: well developed, well nourished, in no acute distress, and alert  HEENT:  Normocephalic, atraumatic, mucus membranes moist   Heart: Heart sounds are normal.  Regular rate and rhythm without murmur, gallop or rub. Lungs: Normal expansion. Clear to auscultation. No rales, rhonchi, or wheezing. Abdomen: Bowel sounds present; soft and non-distended; mildly tender to palpation around lap incision sites  Extremities: No cyanosis, pitting edema, rashes noted. Skin: Skin color, texture, turgor normal. No rashes or lesions.     IV Access:  PIV left forearm      Data:   Latest Reference Range & Units 10/28/22 03:51   Sodium 135 - 144 mmol/L 135   Potassium 3.7 - 5.3 mmol/L 4.3   Chloride 98 - 107 mmol/L 101   CO2 20 - 31 mmol/L 22   BUN,BUNPL 8 - 23 mg/dL 15   Creatinine 0.70 - 1.20 mg/dL 1.35 (H)   Anion Gap 9 - 17 mmol/L 12   Calcium, Ionized 1.13 - 1.33 mmol/L 1.15   Est, Glom Filt Rate >60 mL/min/1.73m2 52 (L)   Glucose, Random 70 - 99 mg/dL 113 (H)   CALCIUM, SERUM, [id number] 8.6 - 10.4 mg/dL 9.0   ALBUMIN/GLOBULIN RATIO 1.0 - 2.5  1.0   Total Protein 6.4 - 8.3 g/dL 6.6   Albumin 3.5 - 5.2 g/dL 3.3 (L)   Alk Phos 40 - 129 U/L 148 (H)   ALT 5 - 41 U/L 21   AST <40 U/L 48 (H)   Bilirubin 0.3 - 1.2 mg/dL 1.3 (H)   Lipase 13 - 60 U/L 15   WBC 3.5 - 11.3 k/uL 7.7   RBC 4.21 - 5.77 m/uL 4.27   Hemoglobin Quant 13.0 - 17.0 g/dL 13.3   Hematocrit 40.7 - 50.3 % 40.2 (L)   MCV 82.6 - 102.9 fL 94.1   MCH 25.2 - 33.5 pg 31.1   MCHC 28.4 - 34.8 g/dL 33.1   MPV 8.1 - 13.5 fL 9.7   RDW 11.8 - 14.4 % 13.8   Platelet Count 509 - 453 k/uL 158   Absolute Mono # 0.10 - 1.20 k/uL 0.45   Eosinophils % 1 - 4 % 3   Basophils Absolute 0.00 - 0.20 k/uL <0.03   Seg Neutrophils 36 - 65 % 80 (H)   Segs Absolute 1.50 - 8.10 k/uL 6.25   Lymphocytes 24 - 43 % 10 (L)   Absolute Lymph # 1.10 - 3.70 k/uL 0.73 (L)   Monocytes 3 - 12 % 6   Absolute Eos # 0.00 - 0.44 k/uL 0.22   Basophils 0 - 2 % 0   Immature Granulocytes 0 % 1 (H)   Absolute Immature Granulocyte 0.00 - 0.30 k/uL 0.05   NRBC Automated 0.0 per 100 WBC 0.0   (H): Data is abnormally high  (L): Data is abnormally low        Radiology Review:  no new imaging    ASSESSMENT:  Active Hospital Problems    Diagnosis Date Noted    Postoperative retention of urine [N99.89, R33.8] 10/29/2022     Priority: Medium    TAMARA (acute kidney injury) ([facility] [address] [N17.9] 10/27/2022     Priority: Medium    History of non-ST elevation myocardial infarction (NSTEMI) 6/2022 [I25.2] 10/27/2022     Priority: Medium    Gallstone pancreatitis [K85.10] 10/26/2022     Priority: Medium    Calculus of gallbladder with acute cholecystitis without obstruction [K80.00] 10/26/2022     Priority: Medium    Bandemia [D72.825] 10/26/2022     Priority: Medium    Pulmonary nodule [R91.1] 10/26/2022     Priority: Medium    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] 10/26/2022     Priority: Medium    Acute cholecystitis [K81.0] 10/26/2022 Priority: Medium    Coronary artery disease involving native heart with angina pectoris ([facility] [address] [I25.119]      Priority: Medium    Stage 3b chronic kidney disease (CKD) ([facility] [address] [N18.32] 04/20/2022     Priority: Medium    Parkinson disease ([address] Charles Filler 01/16/2020    Pulmonary hypertension, mild ([facility] [address] [I27.20] 08/31/2017    Essential hypertension [I10] 03/23/2015    CKD (chronic kidney disease) stage 3, GFR 30-59 ml/min ([facility] [address] [N18.30] 03/04/2014    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 male with abdominal pain and vomiting. MRCP significant for cholecystitis no choledocholithiasis. POD 1 from laparoscopic cholecystectomy. Plan:  Diet:  regular  Analgesia:  morphine and tylenol  GI prophylaxis: pantoprazole  DVT prophylaxis:  IPC  AC: continue to hold Plavix until Monday, 10/31  Activity:  Continue to encourage ambulation/activity w/ PT/OT  Continue to encourage incentive spirometry  ID: [email or web address] completed overnight. D/c IVF  Labs: continue to trend alk phos, bilirubin, lipase and ALT/AST   Resolution of urinary retention before discharge. Bladder scans q4  Stable for discharge from a surgery perspective. Plan discussed with Dr. Demetrius Krabbe.     Electronically signed by Mila Buckley DO  on 10/30/2022 at 6:14 AM

## 2022-10-30 NOTE — PROGRESS NOTES
[facility]  Office: [address], DO, Dana Stain, DO, Demetrius Hallman, DO, Horace Hedrick Blood, DO, Cordelia Davis MD, Melanie Wisdom MD, Miguel Reid MD, Flo Ho MD,  Brenda Flores MD, Evette Biggs MD, Mandeep Rodriguez, DO, Arti Bolden MD,  Angelica Vaughn MD, Lou Lara MD, Priscilla Hampton, DO, Socorro Warren MD, Iam Aldana MD, Toma Hernandez, DO, Kaylin Galindo MD, Jaime Roper MD, Dewayne Meredith MD, Cyndy Nicole MD, Betty Celestin, DO, Cheri Connor MD, Reina López MD, Barbie Barry, CNP,  Matthew Prado, CNP, Francisco Conrad, CNP, June Albarran, CNP,  Vinicius El, [facility] ([place], [place]), Khushboo Tyler, CNP, Scott Ch, CNP, Lee Ann Boss, CNP, Ash Handley, CNP, Marcelo Perry, CNP, yRlee Zapien PA-C, Tanner Bianchi, CNS, [facility], [facility] ([place], [place]), Mishel Markham, CNP, Cynthia Arce, [facility], Zonia Matias, [facility]    Progress Note    10/30/2022    4:55 PM    Name:   Archie Shea  MRN:     [id number]     Acct:      [de-identified]   Room:   [address] [place] [address] Day:  4  Admit Date:  10/25/2022  8:54 PM    PCP:   JW Live CNP  Code Status:  Full Code    Subjective:     C/C:   Chief Complaint   Patient presents with    Fatigue     Generalized weakness, intermittent, hx of parkinsons      Interval History Status:   Postop day 1 laparoscopic cholecystectomy  Mild pain  Doing well with diet  Up to chair     Data Base Updates:  WBC10.0k/uL RBC3.93 Low m/uL Gmuobbtrgf39.6 Low      Calcium9.0mg/dL   [id number] Low mmol/L   Potassium4.2mmol/L   Iixqjcvn855opdi/L   CO2 19 Low      Tjfwie56 Low      Brief History:     As documented in the medical record:  \"Pt is a 79 year male with history of Parkinsons, CKD, stroke, NSTEMI who presents with 3-4 days of abdominal pain, nausea, vomiting and overall feeling unwell. Pt reports the pain and fatigue began a few days ago and he has been vomiting after eating. Has reported multiple loose stools, body aches, generalized fatigue. Pt denies having episodes like this in the past. Pt denies any history of abdominal surgeries. Pt denies fever, chest pain, shortness of breath, pnd, orthopnea, leg swelling, cough, hematuria. He reports compliance to plavix. Was advised by friend to go to er after visiting him today. Patient denies any other symptoms at this time. Admitted for further work up and stabilization \"    The intitial assessment and plan included:  \"    * (Principal) Cholecystitis 10/26/2022 Yes     #Sepsis secondary to acute cholecystis   #Moderate Acute Cholecystitis,   -concern for Gallstone pancreatitis  -alk phos 230, lipase 845, bilirubin 3.1, ,   -lactic 3.1, wbc 13.4 w/left shift, ,   -UA negative for bilirubin -->concern for blocked duct   -CT abdomen: Liver is hypodense. 15 mm lamellated gallstone noted. Gallbladder wall thickening noted. Nml appearing pancreas. Fat containing bilateral inguinal hernias  -2 cultures obtian in ER  -Empiric Abx, IVF, urine output, analgesia with nsaid caution given TAMARA, Protonix, US of RUQ if questionable can consider MRCP, NPO, direct bili, prepare for surgery if deemed candidate, he has known findings of large stones in past imaging. Cholestatic liver pattern and no bili in urine. MRCP may delay care. Monitor for gangrene, perforation, peritonitis, ARDS, septic shock.  -Consider early cholecystectomy or percutaneous cholecystomy . Type and screen,   -can narrow abx to metro and ceftriaxone once intial resuscitation and work up complete . Fu on cultures . Serial abdominal exams   -confirm 1 large bore IVS. Must have good Iv access before admission to floors.    -ID and surgery consult        #TAMARA on CKD3  -on admission Cr 1.7, bun 22, bicarb 23,   -Baseline cr 1.4,  BPH  -last echo EF 60-65% in 2021  -UA unrevealing   -NSAID with caution, IVF, bladder scans, au, reassess in morning     #CAD  -denied cp but had a cath in 6/22 for recurrent angina. Cath report:  1. Single vessel coronary artery disease. Patent stent in ostial LAD. Mild Nonobstructive CAD otherwise. LV gram not done due to chronic renal insufficiency. No stent placed, Imdur was added and risk factor modification   -troponin on admission 61-->46, with angelita on ckd  -hx of stent on plavix  -Plavix on hold for possible intervention []  -trend troponin and ekg  -Hold imdur incase hypotension due to shock.   -resume home metoprolol       #frequent falls  -hx of CVA, parkinson's, underlying parkinson's  -carotid US in 2019 without high grade stenosis  -fall precautions, neuro checks       #Lung nodule  -seen incidentally on ct abdomen: 10 mm spiculated nodule  right lung base. >8mm - immediate chest CT for further evaluation.  -Not seen on CT from 6/27/22  PLAN  FU on chest CT     #BPH  -bladder scans prn  -au placed for strict I and o\"     Database has included:  MR:  Impression:  Cholelithiasis with findings suggesting acute cholecystitis. No biliary or pancreatic ductal dilatation. No evidence of   choledocholithiasis. Mild hepatic steatosis. On 10/29 patient underwent:  LAPROSCOPIC CHOLECYSTECTOMY    Postop bladder scan reveals a residual volume of 821 mL    Past Medical History:   has a past medical history of 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 ([address] CKD (chronic kidney disease) stage 2, GFR 60-89 ml/min, CKD (chronic kidney disease) stage 3, GFR 30-59 ml/min ([facility]), Diverticulosis of colon, GERD (gastroesophageal reflux disease), History of non-ST elevation myocardial infarction (NSTEMI) 6/2022, Impaired renal function, MRSA (methicillin resistant staph aureus) culture positive, Osteoarthritis of knee, Parkinson disease ([address] Proteinuria, Skull fracture ([address] Temporary loss of eyesight, and Tubular adenoma of colon. Social History:   reports that he has quit smoking.  He has never used smokeless tobacco. He reports that he does not drink alcohol and does not use drugs. Family History:   Family History   Problem Relation Age of Onset    No Known Problems Mother     No Known Problems Father        Review of Systems:     Review of Systems   Constitutional:  Positive for activity change (Improved) and appetite change (Increased). Respiratory:  Negative for cough and shortness of breath. Cardiovascular:  Negative for chest pain and palpitations. Gastrointestinal:  Positive for abdominal pain (His incisional pain is controlled). Negative for nausea (Resolved) and vomiting (Resolved). Genitourinary:  Positive for difficulty urinating (Has had postop urine retention). Negative for flank pain and hematuria. Neurological:  Positive for tremors (Known Parkinson's). Physical Examination:        Vitals  /75   Pulse 64   Temp 98.6 °F (37 °C) (Oral)   Resp 16   Ht 5' 7\" (1.702 m)   Wt 177 lb 14.6 oz (80.7 kg)   SpO2 98%   BMI 27.86 kg/m²   Temp (24hrs), Av °F (36.7 °C), Min:97 °F (36.1 °C), Max:98.6 °F (37 °C)    Recent Labs     10/29/22  0658 10/29/22  1148   POCGLU 113* 158*       Physical Exam  Vitals reviewed. Constitutional:       General: He is not in acute distress. Appearance: He is not diaphoretic. HENT:      Head: Normocephalic. Nose: Nose normal.   Eyes:      General: No scleral icterus. Conjunctiva/sclera: Conjunctivae normal.   Neck:      Trachea: No tracheal deviation. Cardiovascular:      Rate and Rhythm: Normal rate and regular rhythm. Pulmonary:      Effort: Pulmonary effort is normal. No respiratory distress. Breath sounds: Normal breath sounds. No wheezing or rales. Chest:      Chest wall: No tenderness. Abdominal:      General: There is no distension. Palpations: Abdomen is soft. Tenderness: There is no abdominal tenderness. There is no guarding. Musculoskeletal:         General: No tenderness. Cervical back: Neck supple.    Skin: General: Skin is warm and dry. Comments: Incisional sites are clean and dry   Neurological:      Mental Status: He is alert. Comments: Resting tremor  Bradykinesia       Medications: Allergies: Allergies   Allergen Reactions    Dye [Iodides]      pain    Aspirin      Brain bleed         Current Meds:   Scheduled Meds:    tamsulosin  0.4 mg Oral Daily    sodium chloride flush  5-40 mL IntraVENous 2 times per day    metoprolol tartrate  12.5 mg Oral BID    pantoprazole (PROTONIX) 40 mg injection  40 mg IntraVENous Daily    allopurinol  100 mg Oral Daily    amitriptyline  10 mg Oral Nightly    carbidopa-levodopa  1 tablet Oral BID    entacapone  200 mg Oral BID    gabapentin  100 mg Oral TID    rOPINIRole  0.25 mg Oral Nightly    magnesium oxide  400 mg Oral Daily     Continuous Infusions:    dextrose       PRN Meds: guaiFENesin, oxyCODONE, sodium chloride flush, ondansetron **OR** ondansetron, acetaminophen **OR** acetaminophen, morphine, glucose, dextrose bolus **OR** dextrose bolus, glucagon (rDNA), dextrose, metoprolol    Data:     I/O (24Hr):     Intake/Output Summary (Last 24 hours) at 10/30/2022 1655  Last data filed at 10/30/2022 1534  Gross per 24 hour   Intake 1640.48 ml   Output 3752 ml   Net -2111.52 ml       Labs:  Hematology:  Recent Labs     10/28/22  0351 10/29/22  1039 10/30/22  0557   WBC 7.7 8.9 10.0   RBC 4.27 4.65 3.93*   HGB 13.3 14.7 12.6*   HCT 40.2* 44.5 38.5*   MCV 94.1 95.7 98.0   MCH 31.1 31.6 32.1   MCHC 33.1 33.0 32.7   RDW 13.8 14.0 13.8    163 152   MPV 9.7 9.6 10.0     Chemistry:  Recent Labs     10/28/22  0351 10/29/22  1039 10/30/22  0557    135 130*   K 4.3 4.3 4.2    103 100   CO2 22 20 19*   GLUCOSE 113* 155* 151*   BUN 15 12 13   CREATININE 1.35* 1.42* 1.47*   ANIONGAP 12 12 11   LABGLOM 52* 49* 47*   CALCIUM 9.0 8.9 9.0   CAION 1.15  --   --      Recent Labs     10/28/22  0351 10/29/22  0658 10/29/22  1039 10/29/22  1148 10/30/22  0557   PROT 6.6  --  7.0  --   --    LABALBU 3.3*  --  3.3*  --   --    AST 48*  --  84*  --   --    ALT 21  --  74*  --   --    ALKPHOS 148*  --  155*  --   --    BILITOT 1.3*  --  1.2  --   --    LIPASE 15  --  14  --  12*   POCGLU  --  113*  --  158*  --      ABG:No results found for: POCPH, PHART, PH, POCPCO2, RWF3SLJ, PCO2, POCPO2, PO2ART, PO2, POCHCO3, UOT9BOG, HCO3, NBEA, PBEA, BEART, BE, THGBART, THB, MMI4IOF, XHVB6ACB, R5ZAABCK, O2SAT, FIO2  Lab Results   Component Value Date/Time    SPECIAL LEFT HAND 2.5ML 10/26/2022 12:07 AM     Lab Results   Component Value Date/Time    CULTURE NO GROWTH 4 DAYS 10/26/2022 12:07 AM       Radiology:  CT ABDOMEN PELVIS WO CONTRAST Additional Contrast? None    Result Date: 10/26/2022  Cholelithiasis and acute cholecystitis. Gallbladder ultrasound would be helpful for further characterization. 10 mm spiculated nodule right lung base. RECOMMENDATIONS: Guidelines for follow-up and management of pulmonary nodules found on abdomen CT: >8mm - immediate chest CT for further evaluation. Radiology 2017 http://pubs. rsna.org/doi/full/10.1148/radiol. 6271524645     XR CHEST (2 VW)    Result Date: 10/25/2022  No acute process. CT CHEST WO CONTRAST    Result Date: 10/26/2022  1. The spiculated 11 mm nodule described on the CT abdomen study demonstrates a more conspicuous central calcification, with multiple similar appearing areas of peribronchial nodules with calcifications seen in the right lower lobe, felt likely related to sequela of prior inflammatory/infectious process, and very unlikely to represent a neoplastic process. No significant change from June of 2022. Given the degree of clinical concern of these findings, and follow-up recommendations as below, suggest repeat imaging in approximately 12 months. 2. Minimal dependent atelectasis. 3. Other incidental findings as above.  RECOMMENDATIONS: Fleischner Society guidelines for follow-up and management of incidentally detected pulmonary nodules: Multiple Solid Nodules: Nodule size greater than 8 mm In a low-risk patient, CT at 3-6 months, then consider CT at 18-24 months. In a high-risk patient, CT at 3-6 months, then CT at 18-24 months. - Low risk patients include individuals with minimal or absent history of smoking and other known risk factors. - High risk patients include individuals with a history or smoking or known risk factors. Radiology 2017 http://pubs. rsna.org/doi/full/10.1148/radiol. 4281843624     MRI ABDOMEN WO CONTRAST MRCP    Result Date: 10/26/2022  Cholelithiasis with findings suggesting acute cholecystitis. No biliary or pancreatic ductal dilatation. No evidence of choledocholithiasis. Mild hepatic steatosis.        Assessment:        Primary Problem  Acute cholecystitis    Active Hospital Problems    Diagnosis Date Noted    History of coronary artery stent placement [Z95.5] 10/30/2022     Priority: Medium    Postoperative retention of urine [N99.89, R33.8] 10/29/2022     Priority: Medium    TAMARA (acute kidney injury) ([facility] [N17.9] 10/27/2022     Priority: Medium    History of non-ST elevation myocardial infarction (NSTEMI) 6/2022 [I25.2] 10/27/2022     Priority: Medium    Gallstone pancreatitis [K85.10] 10/26/2022     Priority: Medium    Calculus of gallbladder with acute cholecystitis without obstruction [K80.00] 10/26/2022     Priority: Medium    Bandemia [D72.825] 10/26/2022     Priority: Medium    Pulmonary nodule [R91.1] 10/26/2022     Priority: Medium    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) [N18.30] 03/04/2014    Intracranial bleed : traumatic left sub-dural hematoma ,managed conservatively in 2011 [I62.9] 07/09/2013         Plan:        Surgical evaluation and f/u Dr Tosha Dalal cath as needed urine retention  Flomax  Will resume antiplatelet therapy ASAP (stents):  Plavix   (Will resume tomorrow)  Antibiotics per Infectious Disease service   -Zosyn DC'd  Pain management  Trend LFTs  Trend lipase  Check bun and creatinine  Avoid nephrotoxins  Renal follow-up Dr. Lois Hermosillo   Blood Pressure - Monitor and control   Pulmonary nodule surveillance and follow-up outpatient  Sinemet  PT/OT  Neurology follow-up Dr. Ansley Turner: Parkinson's   DVT prophylaxis  Discharge planning  Will discharge when arrangements complete and ok with other services.   Follow-up with PCP in one week, JW Eduardo - CNP  Notify PCP of discharge  Anticipate placement  Patient hoping to go to [facility]  Med rec done  JOVANNA done  DCP 33 min+    IP CONSULT TO [facility] TO HOSPITALIST  IP CONSULT TO INFECTIOUS DISEASES  IP CONSULT TO DO Mary  10/30/2022  4:55 PM

## 2022-10-30 NOTE — PLAN OF CARE
Problem: Skin/Tissue Integrity  Goal: Absence of new skin breakdown  Description: 1. Monitor for areas of redness and/or skin breakdown  2. Assess vascular access sites hourly  3. Every 4-6 hours minimum:  Change oxygen saturation probe site  4. Every 4-6 hours:  If on nasal continuous positive airway pressure, respiratory therapy assess nares and determine need for appliance change or resting period.   10/30/2022 0320 by Luis Henson RN  Outcome: Progressing  10/29/2022 1621 by Feng Beaver RN  Outcome: Progressing     Problem: Safety - Adult  Goal: Free from fall injury  10/30/2022 0320 by Luis Henson RN  Outcome: Progressing  10/29/2022 1621 by Feng Beaver RN  Outcome: Progressing     Problem: ABCDS Injury Assessment  Goal: Absence of physical injury  10/30/2022 0320 by Luis Henson RN  Outcome: Progressing  10/29/2022 1621 by Feng Beavre RN  Outcome: Progressing     Problem: Chronic Conditions and Co-morbidities  Goal: Patient's chronic conditions and co-morbidity symptoms are monitored and maintained or improved  10/30/2022 0320 by Luis Henson RN  Outcome: Progressing  10/29/2022 1621 by Feng Beaver RN  Outcome: Progressing     Problem: Discharge Planning  Goal: Discharge to home or other facility with appropriate resources  10/30/2022 0320 by Luis Henson RN  Outcome: Progressing  10/29/2022 1621 by Feng Beaver RN  Outcome: Progressing     Problem: Pain  Goal: Verbalizes/displays adequate comfort level or baseline comfort level  10/30/2022 0320 by Luis Henson RN  Outcome: Progressing  10/29/2022 1621 by Feng Beaver RN  Outcome: Progressing

## 2022-10-30 NOTE — PROGRESS NOTES
[facility] - Progress Note    Today's Date and Time: 10/30/2022, 1:37 PM    Impression :   Acute cholecystitis  No biliary or pancreatic ductal dilation or choledocholithiasis on MRCP  Cholelithiasis  S/P laparoscopic cholecystectomy 10-29-22  Pulmonary nodule  Transaminitis  Leukocytosis  TAMARA-resolved  Hx Parkinson's  Hx MRSA on leg wound 2015  Urinary retention    Recommendations:   Monitor off antibiotics  Completed IV Zosyn for cholecystitis   Closely monitor patient's progress      Medical Decision Making/Summary/Discussion:10/30/2022     Patient with acute cholelithiasis and cholecystitis  MRCP completed with no biliary or pancreatic ductal dilation or choledocholithiasis. S/P laparoscopic cholecystectomy 10-29-22 with findings of acute on chronic cholecystitis with significant periportal edema and inflammation  Infection Control Recommendations   [place] Precautions    Antimicrobial Stewardship Recommendations   Discontinuation of therapy    Coordination of Outpatient Care:   Estimated Length of IV antimicrobials: 10-30-22  Patient will need Midline Catheter Insertion: TBD  Patient will need PICC line Insertion:BD  Patient will need: Home IV , [place],  SNF,  LTAC: TBD  Patient will need outpatient wound care:    Chief complaint/reason for consultation:   Antibiotic recommendations for acute cholecystitis    History of Present Illness:   Rhett Juarez is a 79y.o.-year-old male who was initially admitted on 10/25/2022. Patient seen at the request of Dr. Edith Winchester. INITIAL HISTORY:    This patient presented to the ED on 10/25/22 with complaints of generalized weakness as well as abdominal pain and 3-4 episodes of emesis over the past couple days. Lab work revealed elevated AST/ALT, bilirubin, lipase and alk phos. A CT abdomen revealed acute cholecystitis with cholelithiasis.    A CT chest was also completed that showed a 10 mm spiculated nodule in the right lung base. General surgery was consulted and an MRCP was ordered. MRCP findings include cholelithiasis with acute cholecystitis  No biliary or pancreatic ductal dilation and no evidence of choledocholithiasis. The patient received one dose of Zosyn. On 10/26/22    IMAGING:  CT ABDOMEN PELVIS WO CONTRAST Additional Contrast? None     Result Date: 10/26/2022  Cholelithiasis and acute cholecystitis. Gallbladder ultrasound would be helpful for further characterization. 10 mm spiculated nodule right lung base. RECOMMENDATIONS: Guidelines for follow-up and management of pulmonary nodules found on abdomen CT: >8mm - immediate chest CT for further evaluation. Radiology 2017 http://pubs. rsna.org/doi/full/10.1148/radiol. 1402908484      XR CHEST (2 VW)     Result Date: 10/25/2022  No acute process. MRCP 10/26/22  Impression   Cholelithiasis with findings suggesting acute cholecystitis. No biliary or pancreatic ductal dilatation. No evidence of   choledocholithiasis. Mild hepatic steatosis. CURRENT EVALUATION 10/30/2022  /60   Pulse 74   Temp 98.3 °F (36.8 °C) (Oral)   Resp 18   Ht 5' 7\" (1.702 m)   Wt 177 lb 14.6 oz (80.7 kg)   SpO2 99%   BMI 27.86 kg/m²       Afebrile  VS stable    Patient feels better  No complaints  No new issues per RN    Underwent laparoscopic cholecystectomy 10-29-22 with findings of acute on chronic cholecystitis with significant periportal edema and inflammation    Zosyn continues. Tolerating well. AST, ALT, ALP, Bilirubin, and Lipase are trending downwards    Labs, X rays reviewed: 10/30/2022    BUN: 21-->15-->13  Cr: 1.71-->1.35-->1.47    WBC: 13.4-->8.8-->7.7-->10.0  Hb: 15.1-->13-->13.3-->12.6  Plat: 204-->145-->158-->152    ALT: 363-->70-->21  AST: 427-->83-->48  Alk phos: 230-->143-->148  Bili: 3.1-->2.1-->1. 3  Lipase: 845-->16-->15    CRP: 45.4    Cultures:  Urine:  10/25/22: No significant growth  Blood:  10/26/22: No growth  Sputum :    Wound:    MRSA Nares:    Imaging:  CT Chest 10/25/22      CT abd/pelvis 10/25/22      Discussed with patient, RN, family. I have personally reviewed the past medical history, past surgical history, medications, social history, and family history, and I have updated the database accordingly.   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 ([address] 3/18/2011    d/t being hurt on the job    CKD (chronic kidney disease) stage 2, GFR 60-89 ml/min     CKD (chronic kidney disease) stage 3, GFR 30-59 ml/min ([facility])     Diverticulosis of colon     GERD (gastroesophageal reflux disease)     History of non-ST elevation myocardial infarction (NSTEMI) 6/2022 10/27/2022    Impaired renal function     MRSA (methicillin resistant staph aureus) culture positive 9/23/2015    leg    Osteoarthritis of knee     Left    Parkinson disease ([address]     Proteinuria     Skull fracture ([address] 3/18/2011    d/t 12-foot drop on the job    Temporary loss of eyesight     periodically, happened x7    Tubular adenoma of colon 2012, 2017    mulitple     Past Surgical  History:     Past Surgical History:   Procedure Laterality Date    CHOLECYSTECTOMY, LAPAROSCOPIC  10/29/2022    LAPROSCOPIC CHOLECYSTECTOMY, POSSIBLE OPEN    COLONOSCOPY  11/02/2012    poor prep, tubular adenoma    COLONOSCOPY  09/19/2017    diverticulosis, multiple polyps as described, spot marking done, pathology-tubular adenoma x 4    [place] COLSC FLX W/RMVL OF TUMOR POLYP LESION SNARE TQ N/A 09/19/2017    COLONOSCOPY POLYPECTOMY SNARE/COLD BIOPSY with tatooing and apc transverse colon performed by Ivet Pacheco MD at [address] [place] [address] Right     rotator cuff     Medications:      tamsulosin  0.4 mg Oral Daily    sodium chloride flush  5-40 mL IntraVENous 2 times per day    metoprolol tartrate  12.5 mg Oral BID    pantoprazole (PROTONIX) 40 mg injection  40 mg IntraVENous Daily    allopurinol  100 mg Oral Daily    amitriptyline  10 mg Oral Nightly    carbidopa-levodopa  1 tablet Oral BID    entacapone  200 mg Oral BID    gabapentin  100 mg Oral TID    rOPINIRole  0.25 mg Oral Nightly    magnesium oxide  400 mg Oral Daily     Social History:     Social History     Socioeconomic History    Marital status: Single     Spouse name: Not on file    Number of children: Not on file    Years of education: Not on file    Highest education level: Not on file   Occupational History    Not on file   Tobacco Use    Smoking status: Former    Smokeless tobacco: Never   Vaping Use    Vaping Use: Never used   Substance and Sexual Activity    Alcohol use: No    Drug use: No    Sexual activity: Not Currently   Other Topics Concern    Not on file   Social History Narrative    Not on file     Social Determinants of Health     Financial Resource Strain: Low Risk     Difficulty of Paying Living Expenses: Not hard at all   Food Insecurity: No Food Insecurity    Worried About Running Out of Food in the Last Year: Never true    Ran Out of Food in the Last Year: Never true   Transportation Needs: No Transportation Needs    Lack of Transportation (Medical): No    Lack of Transportation (Non-Medical): No   Physical Activity: Inactive    Days of Exercise per Week: 0 days    Minutes of Exercise per Session: 0 min   Stress: Stress Concern Present    Feeling of Stress : To some extent   Social Connections: Socially Isolated    Frequency of Communication with Friends and Family: Three times a week    Frequency of Social Gatherings with Friends and Family:  Three times a week    Attends Taoism Services: Never    Active Member of Clubs or Organizations: No    Attends Club or Organization Meetings: Never    Marital Status:    Intimate Partner Violence: Not At Risk    Fear of Current or Ex-Partner: No    Emotionally Abused: No    Physically Abused: No    Sexually Abused: No   Housing Stability: Low Risk     Unable to Pay for Housing in the Last Year: No    Number of Places Lived in the Last Year: 1    Unstable Housing in the Last Year: No     Family History:     Family History   Problem Relation Age of Onset    No Known Problems Mother     No Known Problems Father       Allergies:   Dye [iodides] and Aspirin     Review of Systems:   Constitutional: No fevers or chills. No systemic complaints  Head: No headaches  Eyes: No double vision or blurry vision. No conjunctival inflammation. ENT: No sore throat or runny nose. . No hearing loss, tinnitus or vertigo. Cardiovascular: No chest pain or palpitations. No shortness of breath. No METZ  Lung: No shortness of breath or cough. No sputum production  Abdomen: No nausea, vomiting, diarrhea, or abdominal pain. Hanane Leaf No cramps. Genitourinary: No increased urinary frequency, or dysuria. No hematuria. No suprapubic or CVA pain  Musculoskeletal: No muscle aches or pains. No joint effusions, swelling or deformities  Hematologic: No bleeding or bruising. Neurologic: No headache, weakness, numbness, or tingling. Integument: No rash, no ulcers. Psychiatric: No depression. Endocrine: No polyuria, no polydipsia, no polyphagia. Physical Examination :   Patient Vitals for the past 8 hrs:   BP Temp Temp src Pulse Resp SpO2 Weight   10/30/22 1115 120/60 98.3 °F (36.8 °C) Oral 74 18 99 % --   10/30/22 0904 -- -- -- 64 20 -- --   10/30/22 0815 (!) 140/89 -- -- 64 16 100 % --   10/30/22 0600 -- -- -- -- -- -- 177 lb 14.6 oz (80.7 kg)     General Appearance: Awake, alert, and in no apparent distress  Head:  Normocephalic, no trauma  Eyes: Pupils equal, round, reactive to light and accommodation; extraocular movements intact; sclera anicteric; conjunctivae pink. No embolic phenomena. ENT: Oropharynx clear, without erythema, exudate, or thrush. No tenderness of sinuses. Mouth/throat: mucosa pink and moist. No lesions. Dentition in good repair. Neck:Supple, without lymphadenopathy. Thyroid normal, No bruits.   Pulmonary/Chest: Clear to auscultation, without wheezes, rales, or rhonchi. No dullness to percussion. Cardiovascular: Regular rate and rhythm without murmurs, rubs, or gallops. Abdomen: Soft, non tender. Bowel sounds normal. No organomegaly  All four Extremities: No cyanosis, clubbing, edema, or effusions. Neurologic: No gross sensory or motor deficits. Skin: Warm and dry with good turgor. No signs of peripheral arterial or venous insufficiency. No ulcerations. No open wounds. Medical Decision Making -Laboratory:   I have independently reviewed/ordered the following labs:    CBC with Differential:   Recent Labs     10/28/22  0351 10/29/22  1039 10/30/22  0557   WBC 7.7 8.9 10.0   HGB 13.3 14.7 12.6*   HCT 40.2* 44.5 38.5*    163 152   LYMPHOPCT 10* 10*  --    MONOPCT 6 4  --      BMP:   Recent Labs     10/29/22  1039 10/30/22  0557    130*   K 4.3 4.2    100   CO2 20 19*   BUN 12 13   CREATININE 1.42* 1.47*     Hepatic Function Panel:   Recent Labs     10/28/22  0351 10/29/22  1039   PROT 6.6 7.0   LABALBU 3.3* 3.3*   BILITOT 1.3* 1.2   ALKPHOS 148* 155*   ALT 21 74*   AST 48* 84*     No results for input(s): RPR in the last 72 hours. No results for input(s): HIV in the last 72 hours. No results for input(s): BC in the last 72 hours.   Lab Results   Component Value Date/Time    MUCUS NOT REPORTED 02/16/2022 12:09 PM    RBC 3.93 10/30/2022 05:57 AM    RBC 5.14 02/24/2012 11:00 AM    TRICHOMONAS NOT REPORTED 02/16/2022 12:09 PM    WBC 10.0 10/30/2022 05:57 AM    YEAST NOT REPORTED 02/16/2022 12:09 PM    TURBIDITY Clear 10/25/2022 10:25 PM     Lab Results   Component Value Date/Time    CREATININE 1.47 10/30/2022 05:57 AM    GLUCOSE 151 10/30/2022 05:57 AM    GLUCOSE 97 04/24/2012 12:55 PM     Medical Decision Making-Imaging:     Narrative   EXAMINATION:   CT OF THE CHEST WITHOUT CONTRAST 10/26/2022 1:29 am       TECHNIQUE:   CT of the chest was performed without the administration of intravenous contrast. Multiplanar reformatted images are provided for review. Automated   exposure control, iterative reconstruction, and/or weight based adjustment of   the mA/kV was utilized to reduce the radiation dose to as low as reasonably   achievable. COMPARISON:   CT abdomen and pelvis the previous day, CT chest on 06/27/2022       HISTORY:   ORDERING SYSTEM PROVIDED HISTORY: eval mass   TECHNOLOGIST PROVIDED HISTORY:   eval mass   Decision Support Exception - unselect if not a suspected or confirmed   emergency medical condition->Emergency Medical Condition (MA)       FINDINGS:   Mediastinum: No cardiomegaly is identified. No focal esophageal thickening   is seen. Small hiatal hernia. No mediastinal lymphadenopathy is identified. Calcified lymph nodes are noted. No thyroid mass. No pericardial effusion. Coronary artery stent is noted. Lungs/pleura: Respiratory motion artifact, however there are numerous areas   of mild peribronchial nodularity seen within the right lower lobe, most of   which demonstrate small punctate benign-appearing calcifications, and   findings felt likely related to post inflammatory/infectious changes. The   described spiculated nodule on the comparison CT abdomen demonstrates a   calcification which is more conspicuous on this study, and felt very unlikely   to represent a neoplastic process. Upper Abdomen: Cholelithiasis with potential cholecystitis noted, partially   imaged as previously discussed. Soft Tissues/Bones: No axillary or supraclavicular lymphadenopathy is   identified. No acute osseous abnormality. No acute vertebral body fracture   is identified. No osseous destructive process is identified. Impression   1.  The spiculated 11 mm nodule described on the CT abdomen study demonstrates   a more conspicuous central calcification, with multiple similar appearing   areas of peribronchial nodules with calcifications seen in the right lower lobe, felt likely related to sequela of prior inflammatory/infectious   process, and very unlikely to represent a neoplastic process. No significant   change from June of 2022. Given the degree of clinical concern of these   findings, and follow-up recommendations as below, suggest repeat imaging in   approximately 12 months. 2. Minimal dependent atelectasis. 3. Other incidental findings as above. RECOMMENDATIONS:   Fleischner Society guidelines for follow-up and management of incidentally   detected pulmonary nodules:       Multiple Solid Nodules:       Nodule size greater than 8 mm   In a low-risk patient, CT at 3-6 months, then consider CT at 18-24 months. In a high-risk patient, CT at 3-6 months, then CT at 18-24 months. - Low risk patients include individuals with minimal or absent history of   smoking and other known risk factors. - High risk patients include individuals with a history or smoking or known   risk factors. Radiology 2017 http://pubs. rsna.org/doi/full/10.1148/radiol. 5774138039         Narrative   EXAMINATION:   CT OF THE ABDOMEN AND PELVIS WITHOUT CONTRAST 10/25/2022 11:38 pm       TECHNIQUE:   CT of the abdomen and pelvis was performed without the administration of   intravenous contrast. Multiplanar reformatted images are provided for review. Automated exposure control, iterative reconstruction, and/or weight based   adjustment of the mA/kV was utilized to reduce the radiation dose to as low   as reasonably achievable. COMPARISON:   None. HISTORY:   ORDERING SYSTEM PROVIDED HISTORY: eval for gallstone pancreatitis   TECHNOLOGIST PROVIDED HISTORY:   eval for gallstone pancreatitis       Decision Support Exception - unselect if not a suspected or confirmed   emergency medical condition->Emergency Medical Condition (MA)   Reason for Exam: eval for gallstone pancreatitis       FINDINGS:   Lower Chest: Lad stent/coronary artery disease.   10 mm spiculated nodule   right lung base. Organs: Liver is hypodense. 15 mm lamellated gallstone noted. Gallbladder   wall thickening noted. Adrenals and pancreas normal.       GI/Bowel: Small hiatal hernia. Small large bowel normal.  Appendix normal.       Pelvis: Fat containing bilateral inguinal hernias. Peritoneum/Retroperitoneum: Calcified plaque along the aorta and its branches. Bones/Soft Tissues: Spondylosis and multilevel vacuum disc phenomena. Slight   anterior subluxation L4-5. Impression   Cholelithiasis and acute cholecystitis. Gallbladder ultrasound would be   helpful for further characterization. 10 mm spiculated nodule right lung base. RECOMMENDATIONS:   Guidelines for follow-up and management of pulmonary nodules found on abdomen   CT:       >8mm - immediate chest CT for further evaluation. Radiology 2017 http://pubs. rsna.org/doi/full/10.1148/radiol. 8881911599     Narrative   EXAMINATION:   MRI OF THE ABDOMEN WITHOUT CONTRAST AND MRCP 10/26/2022 9:47 am       TECHNIQUE:   Multiplanar multisequence MRI of the abdomen was performed without the   administration of intravenous contrast.  After initial T2 axial and coronal   images, thick slab, thin slab and 3D coronal MRCP sequences were obtained   without the administration of intravenous contrast.  MIP images are provided   for review. COMPARISON:   10/25/2022 CT abdomen/pelvis       HISTORY:   ORDERING SYSTEM PROVIDED HISTORY: cholelithiasis elevated lft's   ,?choledocholithiasis   TECHNOLOGIST PROVIDED HISTORY:   cholelithiasis elevated lft's ,?choledocholithiasis   What is the sedation requirement?->None       FINDINGS:   Gallbladder: There is cholelithiasis with a large stone measuring   approximately 19 mm in size. There is gallbladder wall thickening measuring   approximately 7 mm in thickness. There is also pericholecystic fluid.    Findings can be seen with acute cholecystitis in the proper clinical setting. Bile Ducts: There is no evidence of common bile duct dilatation. Common bile   duct measures approximately 4 mm in diameter. No intrahepatic or   extrahepatic biliary ductal dilatation. No evidence of choledocholithiasis. No evidence of common bile duct stricture or obstruction. Pancreatic Duct: No pancreatic ductal dilatation or obstruction. No   pancreatic ductal stones. No evidence of pancreatic divisum. Other:  Examination not tailored for the evaluation of the solid abdominal   organs. There is mild diffuse hepatic steatosis. No evidence of   hydronephrosis. No evidence of abdominal ascites or lymphadenopathy. Visualized bowel is grossly unremarkable. Osseous structures demonstrate no   acute abnormality. Impression   Cholelithiasis with findings suggesting acute cholecystitis. No biliary or pancreatic ductal dilatation. No evidence of   choledocholithiasis. Mild hepatic steatosis. Narrative   EXAMINATION:   TWO XRAY VIEWS OF THE CHEST       10/25/2022 9:44 pm       COMPARISON:   July 19, 2022       HISTORY:   ORDERING SYSTEM PROVIDED HISTORY: eval for pneumonia   TECHNOLOGIST PROVIDED HISTORY:   eval for pneumonia       FINDINGS:   The lungs are without acute focal process. There is no effusion or   pneumothorax. The cardiomediastinal silhouette is without acute process. The   osseous structures are without acute process. Impression   No acute process.      Medical Decision [id number]:     Medical Decision Making-Other:   Note:  Labs, medications, radiologic studies were reviewed with personal review of films  Large amounts of data were reviewed  Discussed with nursing Staff, Discharge planner  Infection Control and Prevention measures reviewed  All prior entries were reviewed  Administer medications as ordered  Prognosis: [address]  Discharge planning reviewed    Thank you for allowing us to participate in the care of this patient. Please call with questions.     Celso Fajardo MD        Pager: [phone] - Office: [phone]

## 2022-10-30 NOTE — DISCHARGE INSTR - COC
Continuity of Care Form    Patient Name: Zaid Lee   :  1940  MRN:  [id number]    [facility] date:  10/25/2022  Discharge date:  22    Code Status Order: Full Code   Advance Directives:     Admitting Physician:  Emily Massey DO  PCP: JW Gamboa CNP    Discharging Nurse: Kita Pacheco [facility] Unit/Room#: 8391/0902-00  Discharging Unit Phone Number: [phone]    Emergency Contact:   Extended Emergency Contact Information  Primary Emergency Contact: JairoJhony  Address: n/a   Ben Chirinos [address] Phone: [phone]  Relation: Child  Secondary Emergency Contact: Dave Mccarthy  [facility] [address] Phone: [phone]  Mobile Phone: [phone]  Relation: Other    Past Surgical History:  Past Surgical History:   Procedure Laterality Date    CHOLECYSTECTOMY, LAPAROSCOPIC  10/29/2022    LAPROSCOPIC CHOLECYSTECTOMY, POSSIBLE OPEN    COLONOSCOPY  2012    poor prep, tubular adenoma    COLONOSCOPY  2017    diverticulosis, multiple polyps as described, spot marking done, pathology-tubular adenoma x 4    LA COLSC FLX W/RMVL OF TUMOR POLYP LESION SNARE TQ N/A 2017    COLONOSCOPY POLYPECTOMY SNARE/COLD BIOPSY with tatooing and apc transverse colon performed by Saige Espana MD at [address] [place] [address] Right     rotator cuff       Immunization History:   Immunization History   Administered Date(s) Administered    COVID-19, PFIZER GRAY top, DO NOT Dilute, (age 15 y+), IM, 30 mcg/0.3 mL 2022    COVID-19, PFIZER PURPLE top, DILUTE for use, (age 15 y+), 30mcg/0.3mL 2021, 2021, 2021    Influenza 2012    Influenza, FLUAD, (age 72 y+), Adjuvanted, 0.5mL 10/19/2020    Influenza, High Dose (Fluzone 65 yrs and older) 2014, 2016, 2017, 2017    Influenza, Triv, inactivated, subunit, adjuvanted, IM (Fluad 65 yrs and older) 2018, 10/06/2019    Pneumococcal Conjugate 13-valent Yue Du) 06/14/2017, 05/18/2018, 05/18/2018    Pneumococcal Polysaccharide ([id number]) 09/25/2012, 12/04/2015       Active Problems:  Patient Active Problem List   Diagnosis Code    Temporary loss of eyesight H53.129    Syncope : posterior circulation stroke vs Neurocardiogenic syncope  R55    Intracranial bleed : traumatic left sub-dural hematoma ,managed conservatively in 2011 I62.9    Headache R51.9    CKD (chronic kidney disease) stage 3, GFR 30-59 ml/min ([facility]) N18.30    Depression F32. A    Hyperlipidemia E78.5    Microhematuria R31.29    Microalbuminuria R80.9    Essential hypertension I10    Generalized abdominal pain R10.84    Tubular adenoma of colon D12.6    Diverticulosis of colon K57.30    History of skull fracture Z87.81    Nausea R11.0    Pure hypercholesterolemia E78.00    Prediabetes R73.03    Parkinson disease ([facility] [address] G20    Chronic post-traumatic headache, not intractable G44.329    Fall (on) (from) other stairs and steps, initial encounter W10. 8XXA    Strain of iliopsoas muscle, initial encounter S76.919A    Chronic pain of left knee M25.562, G89.29    Closed fracture of second toe of right foot S92.501A    Closed fracture of second toe of left foot S92.502A    Abnormal ECG R94.31    Cerebrovascular accident [facility]) I63.9    Chest pain, unspecified R07.9    Hematoma of scalp S00. 03XA    Left ventricular hypertrophy I51.7    Mild aortic valve regurgitation I35.1    Pulmonary hypertension, mild ([facility]) I27.20    Lumbar radiculopathy M54.16    Dizziness R42    Hyponatremia E87.1    Vomiting R11.10    General weakness R53.1    Overweight (BMI 25.0-29. 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) I25.119    Lumbar facet arthropathy M47.816    Spinal stenosis of lumbar region without neurogenic claudication M48.061    Generalized weakness R53.1    Lumbosacral spondylosis without myelopathy M47.817    Gallstone pancreatitis K85.10    Calculus of gallbladder with acute cholecystitis without obstruction K80.00    Bandemia D72.825    Pulmonary nodule R91.1    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    Acute cholecystitis K81.0    TAMARA (acute kidney injury) ([facility] [address] N17.9    History of non-ST elevation myocardial infarction (NSTEMI) 6/2022 I25.2    Postoperative retention of urine N99.89, R33.8       Isolation/Infection:   Isolation            Contact          Patient Infection Status       Infection Onset Added Last Indicated Last Indicated By Review Planned Expiration Resolved Resolved By    MRSA  09/25/15 09/25/15 Ellen Espinal RN        Leg 9/23/15    Resolved    COVID-19 (Rule Out) 10/25/22 10/25/22 10/25/22 COVID-19, Rapid (Ordered)   10/25/22 Rule-Out Test Resulted    COVID-19 (Rule Out) 09/01/21 09/01/21 09/01/21 COVID-19, Rapid (Ordered)   09/01/21 Rule-Out Test Resulted            Nurse Assessment:  Last Vital Signs: /75   Pulse 64   Temp 98.6 °F (37 °C) (Oral)   Resp 16   Ht 5' 7\" (1.702 m)   Wt 177 lb 14.6 oz (80.7 kg)   SpO2 98%   BMI 27.86 kg/m²     Last documented pain score (0-10 scale): Pain Level: 0  Last Weight:   Wt Readings from Last 1 Encounters:   10/30/22 177 lb 14.6 oz (80.7 kg)     Mental Status:  oriented and alert    IV Access:  - None    Nursing Mobility/ADLs:  Walking   Assisted  Transfer  Assisted  Bathing  Assisted  Dressing  Assisted  Toileting  Assisted  Feeding  Assisted  Med Admin  Assisted  Med Delivery   prefers mixed with sometimes apple sauce after taking pills because he feels it gets suck.     Wound Care Documentation and Therapy:  Incision 10/29/22 Abdomen Medial;Upper (Active)   Dressing Status Other (Comment) 10/30/22 0400   Incision Cleansed Not Cleansed 10/29/22 1600   Dressing/Treatment Open to air 10/30/22 0815   Closure Steri-Strips 10/30/22 0815   Incision Assessment Dry 10/30/22 0815   Drainage Amount None 10/30/22 0815   Karena-incision Assessment Intact 10/30/22 0815   Number of days: 1        Elimination:  Continence: Bowel: Yes  Bladder: Yes  Urinary Catheter: None   Colostomy/Ileostomy/Ileal Conduit: No       Date of Last BM: ***    Intake/Output Summary (Last 24 hours) at 10/30/2022 1649  Last data filed at 10/30/2022 1534  Gross per 24 hour   Intake 1640.48 ml   Output 3752 ml   Net -2111.52 ml     I/O last 3 completed shifts: In: 1160.5 [P.O.:450; I.V.:400; IV Piggyback:310.5]  Out: 5586 [FCVPQ:2829; Blood:50]    Safety Concerns: At Risk for Falls    Impairments/Disabilities:      None    Nutrition Therapy:  Current Nutrition Therapy:   - Oral Diet:  General    Routes of Feeding: Oral  Liquids: No Restrictions  Daily Fluid Restriction: no  Last Modified Barium Swallow with Video (Video Swallowing Test): not done    Treatments at the Time of Hospital Discharge:   Respiratory Treatments: n/a    Oxygen Therapy:  is not on home oxygen therapy.   Ventilator:    - No ventilator support    Rehab Therapies: Physical Therapy and Occupational Therapy  Weight Bearing Status/Restrictions: No weight bearing restrictions  Other Medical Equipment (for information only, NOT a DME order):  walker  Other Treatments: n/a      Patient's personal belongings (please select all that are sent with patient):  Dentures upper    RN SIGNATURE:  Electronically signed by Seda Peters RN on 11/1/22 at 10:41 AM EDT    CASE MANAGEMENT/SOCIAL WORK SECTION    Inpatient Status Date: ***    Readmission Risk Assessment Score:  Readmission Risk              Risk of Unplanned Readmission:  23           Discharging to Facility/ Agency   Name: [facility]  Address:  Phone:  Fax:    Dialysis Facility (if applicable)   Name:  Address:  Dialysis Schedule:  Phone:  Fax:    / signature: Electronically signed by Yony Castillo RN on 11/1/22 at 10:42 AM EDT    PHYSICIAN SECTION    Prognosis: Fair    Condition at Discharge: Stable    Rehab Potential (if transferring to Rehab): Fair    Recommended Labs or Other Treatments After Discharge:   Surgical evaluation and f/u Dr Epstein Patient for further urinary retention  Straight cath as needed urine retention  Flomax  Pain management  Trend LFTs  Trend lipase  Check bun and creatinine  Avoid nephrotoxins  Renal follow-up Dr. Savage Place   Blood Pressure - Monitor and control   Pulmonary nodule surveillance and follow-up outpatient  PT/OT  Neurology follow-up Dr. Zaragoza Shall: Parkinson's   Follow-up with PCP in one week, Brooklynn Capps, APRN - CNP      Surgical evaluation and f/u Dr Epstein Patient for urine retention  Flomax  Pa  Avoid nephrotoxins  Renal follow-up Dr. Hussein Shepard   Blood Pressure - Monitor and control   Pulmonary nodule surveillance and follow-up outpatient  PT/OT  Neurology follow-up Dr. Zaragoza Shall: Parkinson's   Follow-up with PCP in one week, Brooklynn Capps, APRN - CNP    Physician Certification: I certify the above information and transfer of Rhett Juarez  is necessary for the continuing treatment of the diagnosis listed and that he requires [facility] for less 30 days.      Update Admission H&P:   Principal Problem:    Acute cholecystitis  Active Problems:    Stage 3b chronic kidney disease (CKD) (HCC)    Coronary artery disease involving native heart with angina pectoris (HCC)    Gallstone pancreatitis    Calculus of gallbladder with acute cholecystitis without obstruction    Bandemia    Pulmonary nodule    MRSA carrier    TAMARA (acute kidney injury) ([facility] [address]    History of non-ST elevation myocardial infarction (NSTEMI) 6/2022    Postoperative retention of urine Intracranial bleed : traumatic left sub-dural hematoma ,managed conservatively in 2011    CKD (chronic kidney disease) stage 3, GFR 30-59 ml/min (HCC)    Essential hypertension    Parkinson disease ([facility] [address]    Pulmonary hypertension, mild (HCC)  Resolved Problems:    * No resolved hospital problems.  *     PHYSICIAN SIGNATURE:  .Electronically signed by Kole Nevarez MD on 11/1/2022 at 8:49 AM

## 2022-10-30 NOTE — PROGRESS NOTES
Occupational Therapy  Facility/Department: [facility]  Occupational Therapy Daily Treatment Note    Name: Jimmy Landry  : 1940  MRN: [id number]  Date of Service: 10/30/2022    Discharge Recommendations:  Patient would benefit from continued therapy after discharge in order to increase pt balance, strength and independence        Assessment   Performance deficits / Impairments: Decreased functional mobility ; Decreased ADL status; Decreased endurance;Decreased high-level IADLs;Decreased safe awareness;Decreased balance;Decreased cognition  Prognosis: Good  REQUIRES OT FOLLOW-UP: Yes  Activity Tolerance  Activity Tolerance: Patient Tolerated treatment well        Plan   Occupational Therapy Plan  Times Per Week: 3-4x/wk  Current Treatment Recommendations: Balance training, Functional mobility training, Endurance training, Safety education & training, Patient/Caregiver education & training, Equipment evaluation, education, & procurement, Return to work related activity, Self-Care / ADL, Home management training     Restrictions  Restrictions/Precautions  Restrictions/Precautions: Fall Risk, Contact Precautions, Up as Tolerated, General Precautions  Required Braces or Orthoses?: No  Position Activity Restriction  Other position/activity restrictions: up with assist; Hx of parkinsons    Subjective   General  Chart Reviewed: Yes  Family / Caregiver Present: No  General Comment  Comments: Pt and RN agreeable to therapy this day. Pt supine in bed at start of session and retired to seated in chair at session end with call light in reach, chair alarm on and all needs met            Objective        Safety Devices  Type of Devices: Gait belt;Left in chair;Chair alarm in place;Call light within reach; Patient at risk for falls  Restraints  Restraints Initially in Place: No  Balance  Sitting: With support (Min A increasing to CGA static sitting unsupported at EOB demo post lean tolerating approx 8 min)  Standing: With support (Pt tolerated approx 1-2 min static standing with RW at [facility])  Gait  Overall Level of Assistance: Minimum assistance (EOB>chair><BSC utilizing RW req assist for walker management demo 0 LOB)  Toilet Transfers  Toilet - Technique: Ambulating  Equipment Used: Standard bedside commode  Toilet Transfer: Minimal assistance  Toilet Transfers Comments: req assist for RW management     ADL  Toileting: Contact guard assistance;Setup  Toileting Skilled Clinical Factors: CGA for toilet transfer to [facility], personal hygiene not completed  Additional Comments: Prior to [address] entrance, pt bathed by nursing students. Pt limited per BUE tremors, decreased balance and increased fatigue        Bed mobility  Supine to Sit: Moderate assistance (req assist with BLE and trunk)  Scooting:  Moderate assistance  Bed Mobility Comments: HOB elevated, utilizing bed rails  Transfers  Sit to stand: Contact guard assistance  Stand to sit: Contact guard assistance  Transfer Comments: utilizing RW 3/3 sit><stands attempted     Cognition  Overall Cognitive Status: WFL  Cognition Comment: difficult to understand at times  Orientation  Overall Orientation Status: Within Functional Limits                  Education Given To: Patient  Education Provided: Role of Therapy;Transfer Training  Education Provided Comments: proper hand and foot placement; walker management; safety precautions-F Carry over  Education Method: Demonstration;Verbal  Education Outcome: Continued education needed                                AM-PAC Score        AM-[facility] Inpatient Daily Activity Raw Score: 19 (10/30/22 1352)  AM-PAC Inpatient ADL T-Scale Score : 40.22 (10/30/22 1352)  ADL Inpatient CMS 0-100% Score: 42.8 (10/30/22 [facility])  ADL Inpatient CMS G-Code Modifier : CK (10/30/22 [facility])      Goals  Short Term Goals  Time Frame for Short Term Goals: By discharge, pt will:  Short Term Goal 1: Demo fucntional sit<>stand transfers and functional mobility with SBA and LRD PRN  Short Term Goal 2: Demo 10 minutes of dynamic standing balance with SBA to promote increased engagement in ADLS  Short Term Goal 3: Demo UB ADLs with Mod IND  Short Term Goal 4: Demo LB ADLs/toileting with SBA and DME PRN  Short Term Goal 5: Demo 25 minutes of functional activity tolerance to promote icnreased engagement in ADLs       Therapy Time   Individual Concurrent Group Co-treatment   Time In 0823         Time Out 0853         Minutes 30         Timed Code Treatment Minutes: [address] [facility] MAVERICK Messina/L

## 2022-10-31 LAB
CULTURE: NORMAL
CULTURE: NORMAL
GLUCOSE BLD-MCNC: 132 MG/DL (ref 75–110)
GLUCOSE BLD-MCNC: 182 MG/DL (ref 75–110)
Lab: NORMAL
Lab: NORMAL
SPECIMEN DESCRIPTION: NORMAL
SPECIMEN DESCRIPTION: NORMAL

## 2022-10-31 PROCEDURE — 6360000002 HC RX W HCPCS

## 2022-10-31 PROCEDURE — 82947 ASSAY GLUCOSE BLOOD QUANT: CPT

## 2022-10-31 PROCEDURE — 6370000000 HC RX 637 (ALT 250 FOR IP)

## 2022-10-31 PROCEDURE — C9113 INJ PANTOPRAZOLE SODIUM, VIA: HCPCS

## 2022-10-31 PROCEDURE — 2580000003 HC RX 258

## 2022-10-31 PROCEDURE — 1200000000 HC SEMI PRIVATE

## 2022-10-31 PROCEDURE — 97110 THERAPEUTIC EXERCISES: CPT

## 2022-10-31 PROCEDURE — 99232 SBSQ HOSP IP/OBS MODERATE 35: CPT | Performed by: INTERNAL MEDICINE

## 2022-10-31 PROCEDURE — APPSS30 APP SPLIT SHARED TIME 16-30 MINUTES: Performed by: NURSE PRACTITIONER

## 2022-10-31 PROCEDURE — 51798 US URINE CAPACITY MEASURE: CPT

## 2022-10-31 PROCEDURE — 6370000000 HC RX 637 (ALT 250 FOR IP): Performed by: INTERNAL MEDICINE

## 2022-10-31 PROCEDURE — 97116 GAIT TRAINING THERAPY: CPT

## 2022-10-31 RX ORDER — CLOPIDOGREL BISULFATE 75 MG/1
75 TABLET ORAL DAILY
Status: DISCONTINUED | OUTPATIENT
Start: 2022-10-31 | End: 2022-11-01 | Stop reason: HOSPADM

## 2022-10-31 RX ADMIN — GABAPENTIN 100 MG: 100 CAPSULE ORAL at 08:49

## 2022-10-31 RX ADMIN — AMITRIPTYLINE HYDROCHLORIDE 10 MG: 10 TABLET, FILM COATED ORAL at 20:26

## 2022-10-31 RX ADMIN — ROPINIROLE HYDROCHLORIDE 0.25 MG: 0.25 TABLET, FILM COATED ORAL at 20:26

## 2022-10-31 RX ADMIN — CLOPIDOGREL 75 MG: 75 TABLET, FILM COATED ORAL at 12:32

## 2022-10-31 RX ADMIN — SODIUM CHLORIDE, PRESERVATIVE FREE 10 ML: 5 INJECTION INTRAVENOUS at 08:49

## 2022-10-31 RX ADMIN — METOPROLOL TARTRATE 12.5 MG: 25 TABLET ORAL at 20:26

## 2022-10-31 RX ADMIN — SODIUM CHLORIDE, PRESERVATIVE FREE 10 ML: 5 INJECTION INTRAVENOUS at 20:28

## 2022-10-31 RX ADMIN — OXYCODONE 5 MG: 5 TABLET ORAL at 08:48

## 2022-10-31 RX ADMIN — MAGNESIUM GLUCONATE 500 MG ORAL TABLET 400 MG: 500 TABLET ORAL at 08:49

## 2022-10-31 RX ADMIN — GABAPENTIN 100 MG: 100 CAPSULE ORAL at 13:23

## 2022-10-31 RX ADMIN — SODIUM CHLORIDE, PRESERVATIVE FREE 40 MG: 5 INJECTION INTRAVENOUS at 08:49

## 2022-10-31 RX ADMIN — TAMSULOSIN HYDROCHLORIDE 0.4 MG: 0.4 CAPSULE ORAL at 08:48

## 2022-10-31 RX ADMIN — ALLOPURINOL 100 MG: 100 TABLET ORAL at 08:48

## 2022-10-31 RX ADMIN — ENTACAPONE 200 MG: 200 TABLET, FILM COATED ORAL at 08:48

## 2022-10-31 RX ADMIN — CARBIDOPA AND LEVODOPA 1 TABLET: 25; 100 TABLET ORAL at 08:48

## 2022-10-31 RX ADMIN — ACETAMINOPHEN 650 MG: 325 TABLET ORAL at 22:34

## 2022-10-31 RX ADMIN — CARBIDOPA AND LEVODOPA 1 TABLET: 25; 100 TABLET ORAL at 20:26

## 2022-10-31 RX ADMIN — ENTACAPONE 200 MG: 200 TABLET, FILM COATED ORAL at 20:26

## 2022-10-31 RX ADMIN — GABAPENTIN 100 MG: 100 CAPSULE ORAL at 20:26

## 2022-10-31 ASSESSMENT — PAIN DESCRIPTION - DESCRIPTORS
DESCRIPTORS: ACHING
DESCRIPTORS: ACHING;DISCOMFORT

## 2022-10-31 ASSESSMENT — PAIN SCALES - GENERAL
PAINLEVEL_OUTOF10: 7
PAINLEVEL_OUTOF10: 6

## 2022-10-31 ASSESSMENT — ENCOUNTER SYMPTOMS
VOMITING: 0
SHORTNESS OF BREATH: 0
NAUSEA: 0
ABDOMINAL PAIN: 1
COUGH: 0

## 2022-10-31 ASSESSMENT — PAIN DESCRIPTION - ORIENTATION: ORIENTATION: RIGHT

## 2022-10-31 ASSESSMENT — PAIN DESCRIPTION - LOCATION
LOCATION: HEAD
LOCATION: ABDOMEN

## 2022-10-31 NOTE — PROGRESS NOTES
[facility]  Office: [address], , Louise Reuben, DO, Harshnuris Villalobos, DO, Aidee Cottrell Blood, DO, Kg Miller MD, Renny Holm MD, Jayme Simon MD, Ronal Caceres MD,  Momo Fragoso MD, Chrystal Muñoz MD, Lissa Martinez, DO, Gaston Blount MD,  Sis Ruiz MD, Shayy Romano MD, Genet Davalos, DO, Ledy Estes MD, Ramona Avendano MD, Gerardo Rhodes DO, Ellen Thomas MD, Marilyn Longoria MD, Viri Samson MD, Mayo Aldana MD, Luigi Stevens DO, Becky Ng MD, Chetan Montano MD, Ezra Mccormick, CNP,  Yumi Montalvo, CNP, Sophia Bess, CNP, Barby Fields, CNP,  Kaia Loredo, DNP, Clarita Henning, CNP, Unruly Smallwood, CNP, Abhi Liu, CNP, Wili Gil, CNP, Ema Taylor, CNP, Briseyda Wadsworth PAAydinC, Jameel Wilson, CNS, Omid Rosenthal, [facility], Dayton Hutchins, CNP, Hilaria Mora, [facility], Bud Graft, [facility]    Progress Note    10/31/2022    11:13 AM    Name:   Verena Nguyen  MRN:     [id number]     Acct:      [de-identified]   Room:   [address] [place] [address] Day:  5  Admit Date:  10/25/2022  8:54 PM    PCP:   JW Wiggins CNP  Code Status:  Full Code    Subjective:     C/C:   Chief Complaint   Patient presents with    Fatigue     Generalized weakness, intermittent, hx of parkinsons      Interval History Status:   Postop day 2 laparoscopic cholecystectomy  Pain controlled  Ate well  No distress  Discharge planning in progress    Data Base Updates:  [id number] High      Afebrile    Brief History:     As documented in the medical record:  \"Pt is a 79 year male with history of Parkinsons, CKD, stroke, NSTEMI who presents with 3-4 days of abdominal pain, nausea, vomiting and overall feeling unwell. Pt reports the pain and fatigue began a few days ago and he has been vomiting after eating. Has reported multiple loose stools, body aches, generalized fatigue.  Pt denies having episodes like this in the past. Pt denies any history of abdominal surgeries. Pt denies fever, chest pain, shortness of breath, pnd, orthopnea, leg swelling, cough, hematuria. He reports compliance to plavix. Was advised by friend to go to er after visiting him today. Patient denies any other symptoms at this time. Admitted for further work up and stabilization \"    The intitial assessment and plan included:  \"    * (Principal) Cholecystitis 10/26/2022 Yes     #Sepsis secondary to acute cholecystis   #Moderate Acute Cholecystitis,   -concern for Gallstone pancreatitis  -alk phos 230, lipase 845, bilirubin 3.1, ,   -lactic 3.1, wbc 13.4 w/left shift, ,   -UA negative for bilirubin -->concern for blocked duct   -CT abdomen: Liver is hypodense. 15 mm lamellated gallstone noted. Gallbladder wall thickening noted. Nml appearing pancreas. Fat containing bilateral inguinal hernias  -2 cultures obtian in ER  -Empiric Abx, IVF, urine output, analgesia with nsaid caution given TAMARA, Protonix, US of RUQ if questionable can consider MRCP, NPO, direct bili, prepare for surgery if deemed candidate, he has known findings of large stones in past imaging. Cholestatic liver pattern and no bili in urine. MRCP may delay care. Monitor for gangrene, perforation, peritonitis, ARDS, septic shock.  -Consider early cholecystectomy or percutaneous cholecystomy . Type and screen,   -can narrow abx to metro and ceftriaxone once intial resuscitation and work up complete . Fu on cultures . Serial abdominal exams   -confirm 1 large bore IVS. Must have good Iv access before admission to floors. -ID and surgery consult        #TAMARA on CKD3  -on admission Cr 1.7, bun 22, bicarb 23,   -Baseline cr 1.4,  BPH  -last echo EF 60-65% in 2021  -UA unrevealing   -NSAID with caution, IVF, bladder scans, au, reassess in morning     #CAD  -denied cp but had a cath in 6/22 for recurrent angina. Cath report:  1. Single vessel coronary artery disease. Patent stent in ostial LAD. Mild Nonobstructive CAD otherwise. LV gram not done due to chronic renal insufficiency. No stent placed, Imdur was added and risk factor modification   -troponin on admission 61-->46, with tamara on ckd  -hx of stent on plavix  -Plavix on hold for possible intervention []  -trend troponin and ekg  -Hold imdur incase hypotension due to shock.   -resume home metoprolol       #frequent falls  -hx of CVA, parkinson's, underlying parkinson's  -carotid US in 2019 without high grade stenosis  -fall precautions, neuro checks       #Lung nodule  -seen incidentally on ct abdomen: 10 mm spiculated nodule  right lung base. >8mm - immediate chest CT for further evaluation.  -Not seen on CT from 6/27/22  PLAN  FU on chest CT     #BPH  -bladder scans prn  -au placed for strict I and o\"     Database has included:  MR:  Impression:  Cholelithiasis with findings suggesting acute cholecystitis. No biliary or pancreatic ductal dilatation. No evidence of   choledocholithiasis. Mild hepatic steatosis. Renal function was carefully monitored  There were concerns TAMARA      On 10/29 patient underwent:  LAPROSCOPIC CHOLECYSTECTOMY    Patient did develop postop urine retention  Postop bladder scan reveals a residual volume of 821 mL    Postoperative course otherwise as expected  Discharge planning initiated    Past Medical History:   has a past medical history of 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 ([address] CKD (chronic kidney disease) stage 2, GFR 60-89 ml/min, CKD (chronic kidney disease) stage 3, GFR 30-59 ml/min ([facility]), Diverticulosis of colon, GERD (gastroesophageal reflux disease), History of non-ST elevation myocardial infarction (NSTEMI) 6/2022, Impaired renal function, MRSA (methicillin resistant staph aureus) culture positive, Osteoarthritis of knee, Parkinson disease ([address] Proteinuria, Skull fracture ([address] Temporary loss of eyesight, and Tubular adenoma of colon.     Social History:   reports that he has quit smoking. He has never used smokeless tobacco. He reports that he does not drink alcohol and does not use drugs. Family History:   Family History   Problem Relation Age of Onset    No Known Problems Mother     No Known Problems Father        Review of Systems:     Review of Systems   Constitutional:  Positive for activity change (Improved) and appetite change (Increased). Respiratory:  Negative for cough and shortness of breath. Cardiovascular:  Negative for chest pain and palpitations. Gastrointestinal:  Positive for abdominal pain (His incisional pain is controlled). Negative for nausea (Resolved) and vomiting (Resolved). Genitourinary:  Negative for difficulty urinating (Has had postop urine retention), flank pain and hematuria. Neurological:  Positive for tremors (Known Parkinson's). Physical Examination:        Vitals  /62   Pulse 66   Temp 97.9 °F (36.6 °C) (Oral)   Resp 16   Ht 5' 7\" (1.702 m)   Wt 179 lb 0.2 oz (81.2 kg)   SpO2 94%   BMI 28.04 kg/m²   Temp (24hrs), Av.8 °F (36.6 °C), Min:97 °F (36.1 °C), Max:98.6 °F (37 °C)    Recent Labs     10/29/22  0658 10/29/22  1148 10/31/22  0739 10/31/22  1007   POCGLU 113* 158* 132* 182*       Physical Exam  Vitals reviewed. Constitutional:       General: He is not in acute distress. Appearance: He is not diaphoretic. HENT:      Head: Normocephalic. Nose: Nose normal.   Eyes:      General: No scleral icterus. Conjunctiva/sclera: Conjunctivae normal.   Neck:      Trachea: No tracheal deviation. Cardiovascular:      Rate and Rhythm: Normal rate and regular rhythm. Pulmonary:      Effort: Pulmonary effort is normal. No respiratory distress. Breath sounds: Normal breath sounds. No wheezing or rales. Chest:      Chest wall: No tenderness. Abdominal:      General: There is no distension. Palpations: Abdomen is soft. Tenderness: There is no abdominal tenderness.  There is no guarding. Musculoskeletal:         General: No tenderness. Cervical back: Neck supple. Skin:     General: Skin is warm and dry. Comments: Incisional sites are clean and dry   Neurological:      Mental Status: He is alert. Comments: Resting tremor  Bradykinesia       Medications: Allergies: Allergies   Allergen Reactions    Dye [Iodides]      pain    Aspirin      Brain bleed         Current Meds:   Scheduled Meds:    clopidogrel  75 mg Oral Daily    tamsulosin  0.4 mg Oral Daily    sodium chloride flush  5-40 mL IntraVENous 2 times per day    metoprolol tartrate  12.5 mg Oral BID    pantoprazole (PROTONIX) 40 mg injection  40 mg IntraVENous Daily    allopurinol  100 mg Oral Daily    amitriptyline  10 mg Oral Nightly    carbidopa-levodopa  1 tablet Oral BID    entacapone  200 mg Oral BID    gabapentin  100 mg Oral TID    rOPINIRole  0.25 mg Oral Nightly    magnesium oxide  400 mg Oral Daily     Continuous Infusions:    dextrose       PRN Meds: magnesium hydroxide, guaiFENesin, oxyCODONE, sodium chloride flush, ondansetron **OR** ondansetron, acetaminophen **OR** acetaminophen, morphine, glucose, dextrose bolus **OR** dextrose bolus, glucagon (rDNA), dextrose, metoprolol    Data:     I/O (24Hr):     Intake/Output Summary (Last 24 hours) at 10/31/2022 1113  Last data filed at 10/31/2022 1102  Gross per 24 hour   Intake 1080 ml   Output 2655 ml   Net -1575 ml       Labs:  Hematology:  Recent Labs     10/29/22  1039 10/30/22  0557   WBC 8.9 10.0   RBC 4.65 3.93*   HGB 14.7 12.6*   HCT 44.5 38.5*   MCV 95.7 98.0   MCH 31.6 32.1   MCHC 33.0 32.7   RDW 14.0 13.8    152   MPV 9.6 10.0     Chemistry:  Recent Labs     10/29/22  1039 10/30/22  0557    130*   K 4.3 4.2    100   CO2 20 19*   GLUCOSE 155* 151*   BUN 12 13   CREATININE 1.42* 1.47*   ANIONGAP 12 11   LABGLOM 49* 47*   CALCIUM 8.9 9.0     Recent Labs     10/29/22  0658 10/29/22  1039 10/29/22  1148 10/30/22  0557 10/31/22  0739 10/31/22  1007   PROT  --  7.0  --   --   --   --    LABALBU  --  3.3*  --   --   --   --    AST  --  84*  --   --   --   --    ALT  --  74*  --   --   --   --    ALKPHOS  --  155*  --   --   --   --    BILITOT  --  1.2  --   --   --   --    LIPASE  --  14  --  12*  --   --    POCGLU 113*  --  158*  --  132* 182*     ABG:No results found for: POCPH, PHART, PH, POCPCO2, UOB6KWY, PCO2, POCPO2, PO2ART, PO2, POCHCO3, BMM7DGM, HCO3, NBEA, PBEA, BEART, BE, THGBART, THB, AQY4ITN, FLXI0QRW, X0GXSJNP, O2SAT, FIO2  Lab Results   Component Value Date/Time    SPECIAL LEFT HAND 2.5ML 10/26/2022 12:07 AM     Lab Results   Component Value Date/Time    CULTURE NO GROWTH 5 DAYS 10/26/2022 12:07 AM       Radiology:  CT ABDOMEN PELVIS WO CONTRAST Additional Contrast? None    Result Date: 10/26/2022  Cholelithiasis and acute cholecystitis. Gallbladder ultrasound would be helpful for further characterization. 10 mm spiculated nodule right lung base. RECOMMENDATIONS: Guidelines for follow-up and management of pulmonary nodules found on abdomen CT: >8mm - immediate chest CT for further evaluation. Radiology 2017 http://pubs. rsna.org/doi/full/10.1148/radiol. 1868384771     XR CHEST (2 VW)    Result Date: 10/25/2022  No acute process. CT CHEST WO CONTRAST    Result Date: 10/26/2022  1. The spiculated 11 mm nodule described on the CT abdomen study demonstrates a more conspicuous central calcification, with multiple similar appearing areas of peribronchial nodules with calcifications seen in the right lower lobe, felt likely related to sequela of prior inflammatory/infectious process, and very unlikely to represent a neoplastic process. No significant change from June of 2022. Given the degree of clinical concern of these findings, and follow-up recommendations as below, suggest repeat imaging in approximately 12 months. 2. Minimal dependent atelectasis. 3. Other incidental findings as above.  RECOMMENDATIONS: Aimee Society guidelines for follow-up and management of incidentally detected pulmonary nodules: Multiple Solid Nodules: Nodule size greater than 8 mm In a low-risk patient, CT at 3-6 months, then consider CT at 18-24 months. In a high-risk patient, CT at 3-6 months, then CT at 18-24 months. - Low risk patients include individuals with minimal or absent history of smoking and other known risk factors. - High risk patients include individuals with a history or smoking or known risk factors. Radiology 2017 http://pubs. rsna.org/doi/full/10.1148/radiol. 5437074181     MRI ABDOMEN WO CONTRAST MRCP    Result Date: 10/26/2022  Cholelithiasis with findings suggesting acute cholecystitis. No biliary or pancreatic ductal dilatation. No evidence of choledocholithiasis. Mild hepatic steatosis.        Assessment:        Primary Problem  Acute cholecystitis    Active Hospital Problems    Diagnosis Date Noted    History of coronary artery stent placement [Z95.5] 10/30/2022     Priority: Medium    Postoperative retention of urine [N99.89, R33.8] 10/29/2022     Priority: Medium    TAMARA (acute kidney injury) ([facility] [N17.9] 10/27/2022     Priority: Medium    History of non-ST elevation myocardial infarction (NSTEMI) 6/2022 [I25.2] 10/27/2022     Priority: Medium    Gallstone pancreatitis [K85.10] 10/26/2022     Priority: Medium    Calculus of gallbladder with acute cholecystitis without obstruction [K80.00] 10/26/2022     Priority: Medium    Bandemia [D72.825] 10/26/2022     Priority: Medium    Pulmonary nodule [R91.1] 10/26/2022     Priority: Medium    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] 10/26/2022     Priority: Medium    Acute cholecystitis [K81.0] 10/26/2022     Priority: Medium    Coronary artery disease involving native heart with angina pectoris ([address] [I25.119]      Priority: Medium    Stage 3b chronic kidney disease (CKD) ([address] [N18.32] 04/20/2022     Priority: Medium    Parkinson disease ([address] Niall Montes 01/16/2020    Pulmonary hypertension, mild ([address] [I27.20] 08/31/2017    Essential hypertension [I10] 03/23/2015    CKD (chronic kidney disease) stage 3, GFR 30-59 ml/min ([facility]) [N18.30] 03/04/2014    Intracranial bleed : traumatic left sub-dural hematoma ,managed conservatively in 2011 [I62.9] 07/09/2013         Plan:        Surgical evaluation and f/u Dr Umu Rushing for further urinary retention  Straight cath as needed urine retention  Flomax  Will resume antiplatelet therapy ASAP (stents):  Plavix   Pain management  Trend LFTs  Trend lipase  Check bun and creatinine  Avoid nephrotoxins  Renal follow-up Dr. Calista Barcenas   Blood Pressure - Monitor and control   Pulmonary nodule surveillance and follow-up outpatient  Sinemet  PT/OT  Neurology follow-up Dr. Lilibeth Hameed: Parkinson's   DVT prophylaxis  Discharge planning  Will discharge when arrangements complete and ok with other services.   Follow-up with PCP in one week, JW Foster CNP  Notify PCP of discharge  Anticipate placement  Patient hoping to go to [facility]  Med rec done  JOVANNA done  DCP [phone] [facility] TO HOSPITALIST  IP CONSULT TO INFECTIOUS DISEASES  IP CONSULT TO DO Mary  10/31/2022  11:13 AM

## 2022-10-31 NOTE — CARE COORDINATION
Transitional Planning:  Spoke with Rianna Rich at [facility]. She does not have precert. Will need updated PT note. PT notified.

## 2022-10-31 NOTE — PLAN OF CARE
Problem: Skin/Tissue Integrity  Goal: Absence of new skin breakdown  Description: 1. Monitor for areas of redness and/or skin breakdown  2. Assess vascular access sites hourly  3. Every 4-6 hours minimum:  Change oxygen saturation probe site  4. Every 4-6 hours:  If on nasal continuous positive airway pressure, respiratory therapy assess nares and determine need for appliance change or resting period.   Outcome: Progressing     Problem: Safety - Adult  Goal: Free from fall injury  Outcome: Progressing     Problem: ABCDS Injury Assessment  Goal: Absence of physical injury  Outcome: Progressing     Problem: Chronic Conditions and Co-morbidities  Goal: Patient's chronic conditions and co-morbidity symptoms are monitored and maintained or improved  Outcome: Progressing     Problem: Discharge Planning  Goal: Discharge to home or other facility with appropriate resources  Outcome: Progressing     Problem: Pain  Goal: Verbalizes/displays adequate comfort level or baseline comfort level  Outcome: Progressing

## 2022-10-31 NOTE — PROGRESS NOTES
Yes  Activity Tolerance  Activity Tolerance: Patient limited by pain; Patient limited by endurance     Plan   Physcial Therapy Plan  General Plan:  (5-6x/week)  Current Treatment Recommendations: Strengthening, Balance training, Functional mobility training, Transfer training, Gait training, Stair training, Endurance training, Neuromuscular re-education, Therapeutic activities  Safety Devices  Type of Devices: Gait belt, Left in chair, Chair alarm in place, Call light within reach, Patient at risk for falls, Nurse notified  Restraints  Restraints Initially in Place: No     Restrictions  Restrictions/Precautions  Restrictions/Precautions: Fall Risk, Contact Precautions, Up as Tolerated, General Precautions  Required Braces or Orthoses?: No  Position Activity Restriction  Other position/activity restrictions: up with assist; Hx of parkinsons     Subjective   General  Chart Reviewed: Yes  Patient assessed for rehabilitation services?: Yes  Response To Previous Treatment: Patient with no complaints from previous session. Family / Caregiver Present: No  Follows Commands: Within Functional Limits  General Comment  Comments: Pt retired to chair at end of session with call light in reach. Subjective  Subjective: Pt and RN agreeable to PT this morning. Pt supine in bed upon arrival with c/o 8/10 pain in R lower abdomen. Pleasant and cooperative throughout session.        Cognition   Orientation  Overall Orientation Status: Within Functional Limits  Cognition  Overall Cognitive Status: WFL     Objective   Bed mobility  Rolling to Left: Contact guard assistance  Supine to Sit: Moderate assistance (required assistance with trunk and LE progression.)  Sit to Supine: Unable to assess (Pt retired to chair at end of session.)  Scooting: Contact guard assistance  Bed Mobility Comments: HOB elevated, utilizing bed rails  Transfers  Sit to Stand: Minimal Assistance  Stand to Sit: Minimal Assistance  Comment: RW used for transfers, cueing for hand placement with poor return demo. Ambulation  Surface: Level tile  Device: Rolling Walker  Assistance: Contact guard assistance  Quality of Gait: slow and steady, small shuffled steps. Gait Deviations: Slow Ewa;Decreased step length;Decreased step height;Shuffles  Distance: 20ft within room  Comments: Pt's ambulation distance limited d/t increased abdominal pain, RN aware and gave pain meds during session. More Ambulation?: No  Stairs/Curb  Stairs?: No     Balance  Posture: Good  Sitting - Static: Good  Sitting - Dynamic: Good  Standing - Static: Fair  Standing - Dynamic: Fair  Comments: standig balance assessed with rolling walker    Exercise Treatment:   Seated LE exercises: ankle pumps, LAQ, seated marches, hip abduction. Reps: x10 BLE  Comments: Pt performed while seated in chair. AM-PAC Score  AM-PAC Inpatient Mobility Raw Score : 17 (10/31/22 1113)  AM-PAC Inpatient T-Scale Score : 42.13 (10/31/22 1113)  Mobility Inpatient CMS 0-100% Score: 50.57 (10/31/22 1113)  Mobility Inpatient CMS G-Code Modifier : CK (10/31/22 1113)      Goals  Short Term Goals  Time Frame for Short Term Goals: 5 visits  Short Term Goal 1: Pt to ambulate 100 ft RW SBA  Short Term Goal 2: ascend/descend 6 steps with Mod I  Short Term Goal 3: pt to transfer from alternate height surfaces with mod I using appropriate AD  Short Term Goal 4: Pt to toleraate 30 minutes Mod I  Long Term Goals  Time Frame for Long Term Goals : 14 visits  Long Term Goal 1: Pt t ambulate 75 ft Mod I with cane  Patient Goals   Patient Goals : to return home alone       Education  Patient Education  Education Given To: Patient  Education Provided: Role of Therapy;Plan of Care;Transfer Training; Fall Prevention Strategies  Education Method: Verbal;Demonstration  Barriers to Learning: None  Education Outcome: Verbalized understanding      Therapy Time   Individual Concurrent Group Co-treatment   Time In 0836         Time Out 0908 Minutes 32         Timed Code Treatment Minutes: Bossman 28, PTA

## 2022-10-31 NOTE — PROGRESS NOTES
General Surgery:  Daily Progress Note          POD # 2 s/p Laparoscopic Cholecystectomy           PATIENT NAME: Christiano Masters     TODAY'S DATE: 10/31/2022, 5:24 AM  CC:  abdominal pain    SUBJECTIVE:     Pt seen and examined at bedside. Tmax 98. 6. Pt states overall pain is improved, concentrated at upper port site and back. No acute events overnight. Complains of some nausea this morning. Pt reports passing flatus, denies any BM, pt voiding with no post void residual.       Denies  fever, chills, nausea, vomiting, chest pain, and SOB. Tolerating diet of regular low fat diet. Pt ambulated with walker yesterday and sat up in chair. Discussing placement to [facility]. OBJECTIVE:   VITALS:  /74   Pulse 60   Temp 97.2 °F (36.2 °C) (Oral)   Resp 16   Ht 5' 7\" (1.702 m)   Wt 177 lb 14.6 oz (80.7 kg)   SpO2 98%   BMI 27.86 kg/m²      INTAKE/OUTPUT:      Intake/Output Summary (Last 24 hours) at 10/31/2022 0524  Last data filed at 10/31/2022 0157  Gross per 24 hour   Intake 1840.48 ml   Output 2994 ml   Net -1153.52 ml       PHYSICAL EXAM:  General Appearance: well developed, well nourished and in no acute distress  HEENT:  Normocephalic, atraumatic, mucus membranes moist   Heart: Heart sounds are normal.  Regular rate and rhythm without murmur, gallop or rub. Lungs: Normal expansion. Clear to auscultation. No rales, rhonchi, or wheezing. Abdomen:bowel sounds present; abdomen is soft; mild tenderness to palpation diffusely   Incision: steri strips in place over incisions with no drainage noted. Bruising around superior port site    Extremities: No cyanosis, pitting edema, rashes noted. Skin: Skin color, texture, turgor normal. No rashes or lesions.  Multiple post surgical incisions as noted above    IV Access:  PIV left forearm      Data:  Labs pending    Radiology Review:  No New imaging    ASSESSMENT:  Active Hospital Problems    Diagnosis Date Noted    History of coronary artery at 5:24 AM       General Surgery Resident Statement/Addendum  I have discussed the case, including pertinent history and exam findings with the above medical student. I have personally seen the patient. Note was edited and changes made by me. Assessment and plan reviewed and changes made by me. I agree with the assessment and plan as stated above.     Mila Buckley, DO PGY-1  10/31/22 6:22 AM

## 2022-10-31 NOTE — PROGRESS NOTES
[facility] - Progress Note    Today's Date and Time: 10/31/2022, 10:12 AM    Impression :   Acute cholecystitis  No biliary or pancreatic ductal dilation or choledocholithiasis on MRCP  Cholelithiasis  S/P laparoscopic cholecystectomy 10-29-22  Pulmonary nodule  Transaminitis  Leukocytosis  TAMARA-resolved  Hx Parkinson's  Hx MRSA on leg wound 2015  Urinary retention    Recommendations:   Monitor off antibiotics  Completed IV Zosyn for cholecystitis   Closely monitor patient's progress    Medical Decision Making/Summary/Discussion:10/31/2022     Patient with acute cholelithiasis and cholecystitis  MRCP completed with no biliary or pancreatic ductal dilation or choledocholithiasis. S/P laparoscopic cholecystectomy 10-29-22 with findings of acute on chronic cholecystitis with significant periportal edema and inflammation  Infection Control Recommendations   [place] Precautions    Antimicrobial Stewardship Recommendations   Discontinuation of therapy    Coordination of Outpatient Care:   Estimated Length of IV antimicrobials: 10-30-22  Patient will need Midline Catheter Insertion: TBD  Patient will need PICC line Insertion:BD  Patient will need: Home IV , [place],  SNF,  LTAC: TBD  Patient will need outpatient wound care:    Chief complaint/reason for consultation:   Antibiotic recommendations for acute cholecystitis    History of Present Illness:   Haley Salazar is a 79y.o.-year-old male who was initially admitted on 10/25/2022. Patient seen at the request of Dr. Danilo Vaughn. INITIAL HISTORY:    This patient presented to the ED on 10/25/22 with complaints of generalized weakness as well as abdominal pain and 3-4 episodes of emesis over the past couple days. Lab work revealed elevated AST/ALT, bilirubin, lipase and alk phos. A CT abdomen revealed acute cholecystitis with cholelithiasis.    A CT chest was also completed that showed a 10 mm spiculated nodule in the right lung base. General surgery was consulted and an MRCP was ordered. MRCP findings include cholelithiasis with acute cholecystitis  No biliary or pancreatic ductal dilation and no evidence of choledocholithiasis. The patient received one dose of Zosyn. On 10/26/22    IMAGING:  CT ABDOMEN PELVIS WO CONTRAST Additional Contrast? None     Result Date: 10/26/2022  Cholelithiasis and acute cholecystitis. Gallbladder ultrasound would be helpful for further characterization. 10 mm spiculated nodule right lung base. RECOMMENDATIONS: Guidelines for follow-up and management of pulmonary nodules found on abdomen CT: >8mm - immediate chest CT for further evaluation. Radiology 2017 http://pubs. rsna.org/doi/full/10.1148/radiol. 7784383831      XR CHEST (2 VW)     Result Date: 10/25/2022  No acute process. MRCP 10/26/22  Impression   Cholelithiasis with findings suggesting acute cholecystitis. No biliary or pancreatic ductal dilatation. No evidence of   choledocholithiasis. Mild hepatic steatosis. CURRENT EVALUATION 10/31/2022  /62   Pulse 66   Temp 97.9 °F (36.6 °C) (Oral)   Resp 16   Ht 5' 7\" (1.702 m)   Wt 179 lb 0.2 oz (81.2 kg)   SpO2 94%   BMI 28.04 kg/m²       Afebrile  VS stable    The patient is doing well today. He is on room ai and is tolerating a diet. PT has been working with the patient and he has been ambulating. Underwent laparoscopic cholecystectomy 10-29-22 with findings of acute on chronic cholecystitis with significant periportal edema and inflammation  Zosyn completed on 10/30/22.     AST, ALT, ALP, Bilirubin, and Lipase are trending downwards    Discharge planning to [address] [place] [address], X rays reviewed: 10/31/2022    BUN: 21-->15-->13  Cr: 1.71-->1.35-->1.47    WBC: 13.4-->8.8-->7.7-->10.0  Hb: 15.1-->13-->13.3-->12.6  Plat: 204-->145-->158-->152    ALT: 363-->70-->21  AST: 427-->83-->48  Alk phos: 230-->143-->148  Bili: 3.1-->2.1-->1. 3  Lipase: 845-->16-->15    CRP: 45.4    Cultures:  Urine:  10/25/22: No significant growth  Blood:  10/26/22: No growth  Sputum :    Wound:    MRSA Nares:    Imaging:  CT Chest 10/25/22      CT abd/pelvis 10/25/22      Discussed with patient, RN, family. I have personally reviewed the past medical history, past surgical history, medications, social history, and family history, and I have updated the database accordingly.   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 ([facility] [address] 3/18/2011    d/t being hurt on the job    CKD (chronic kidney disease) stage 2, GFR 60-89 ml/min     CKD (chronic kidney disease) stage 3, GFR 30-59 ml/min (HCC)     Diverticulosis of colon     GERD (gastroesophageal reflux disease)     History of non-ST elevation myocardial infarction (NSTEMI) 6/2022 10/27/2022    Impaired renal function     MRSA (methicillin resistant staph aureus) culture positive 9/23/2015    leg    Osteoarthritis of knee     Left    Parkinson disease ([facility] [address]     Proteinuria     Skull fracture ([facility] [address] 3/18/2011    d/t 12-foot drop on the job    Temporary loss of eyesight     periodically, happened x7    Tubular adenoma of colon 2012, 2017    [facility]     Past Surgical  History:     Past Surgical History:   Procedure Laterality Date    CHOLECYSTECTOMY, LAPAROSCOPIC  10/29/2022    LAPROSCOPIC CHOLECYSTECTOMY, POSSIBLE OPEN    COLONOSCOPY  11/02/2012    poor prep, tubular adenoma    COLONOSCOPY  09/19/2017    diverticulosis, multiple polyps as described, spot marking done, pathology-tubular adenoma x 4    MN COLSC FLX W/RMVL OF TUMOR POLYP LESION SNARE TQ N/A 09/19/2017    COLONOSCOPY POLYPECTOMY SNARE/COLD BIOPSY with tatooing and apc transverse colon performed by Camilla Issa MD at [address] [facility] Right     rotator cuff     Medications:      tamsulosin  0.4 mg Oral Daily    sodium chloride flush  5-40 mL IntraVENous 2 times per day    metoprolol tartrate  12.5 mg Oral BID    pantoprazole (PROTONIX) 40 mg injection  40 mg IntraVENous Daily    allopurinol  100 mg Oral Daily    amitriptyline  10 mg Oral Nightly    carbidopa-levodopa  1 tablet Oral BID    entacapone  200 mg Oral BID    gabapentin  100 mg Oral TID    rOPINIRole  0.25 mg Oral Nightly    magnesium oxide  400 mg Oral Daily     Social History:     Social History     Socioeconomic History    Marital status: Single     Spouse name: Not on file    Number of children: Not on file    Years of education: Not on file    Highest education level: Not on file   Occupational History    Not on file   Tobacco Use    Smoking status: Former    Smokeless tobacco: Never   Vaping Use    Vaping Use: Never used   Substance and Sexual Activity    Alcohol use: No    Drug use: No    Sexual activity: Not Currently   Other Topics Concern    Not on file   Social History Narrative    Not on file     Social Determinants of Health     Financial Resource Strain: Low Risk     Difficulty of Paying Living Expenses: Not hard at all   Food Insecurity: No Food Insecurity    Worried About Running Out of Food in the Last Year: Never true    Ran Out of Food in the Last Year: Never true   Transportation Needs: No Transportation Needs    Lack of Transportation (Medical): No    Lack of Transportation (Non-Medical): No   Physical Activity: Inactive    Days of Exercise per Week: 0 days    Minutes of Exercise per Session: 0 min   Stress: Stress Concern Present    Feeling of Stress : To some extent   Social Connections: Socially Isolated    Frequency of Communication with Friends and Family: Three times a week    Frequency of Social Gatherings with Friends and Family:  Three times a week    Attends Caoda[employer] Services: Never    Active Member of Clubs or Organizations: No    Attends Club or Organization Meetings: Never    Marital Status:    Intimate Partner Violence: Not At Risk    Fear of Current or Ex-Partner: No Emotionally Abused: No    Physically Abused: No    Sexually Abused: No   Housing Stability: Low Risk     Unable to Pay for Housing in the Last Year: No    Number of Places Lived in the Last Year: 1    Unstable Housing in the Last Year: No     Family History:     Family History   Problem Relation Age of Onset    No Known Problems Mother     No Known Problems Father       Allergies:   Dye [iodides] and Aspirin     Review of Systems:   Constitutional: No fevers or chills. No systemic complaints  Head: No headaches  Eyes: No double vision or blurry vision. No conjunctival inflammation. ENT: No sore throat or runny nose. . No hearing loss, tinnitus or vertigo. Cardiovascular: No chest pain or palpitations. No shortness of breath. No METZ  Lung: No shortness of breath or cough. No sputum production  Abdomen: No nausea, vomiting, diarrhea, or abdominal pain. Campos [place] No cramps. Genitourinary: No increased urinary frequency, or dysuria. No hematuria. No suprapubic or CVA pain  Musculoskeletal: No muscle aches or pains. No joint effusions, swelling or deformities  Hematologic: No bleeding or bruising. Neurologic: No headache, weakness, numbness, or tingling. Integument: No rash, no ulcers. Psychiatric: No depression. Endocrine: No polyuria, no polydipsia, no polyphagia. Physical Examination :   Patient Vitals for the past 8 hrs:   BP Temp Temp src Pulse Resp SpO2 Weight   10/31/22 1005 112/62 97.9 °F (36.6 °C) Oral 66 16 94 % --   10/31/22 0918 -- -- -- -- 17 -- --   10/31/22 0737 130/78 98.3 °F (36.8 °C) Oral 60 16 97 % --   10/31/22 0600 -- -- -- -- -- -- 179 lb 0.2 oz (81.2 kg)   10/31/22 0558 131/85 97 °F (36.1 °C) Oral 67 16 98 % --   General Appearance: Awake, alert, and in no apparent distress  Head:  Normocephalic, no trauma  Eyes: Pupils equal, round, reactive to light and accommodation; extraocular movements intact; sclera anicteric; conjunctivae pink. No embolic phenomena.   ENT: Oropharynx clear, without erythema, exudate, or thrush. No tenderness of sinuses. Mouth/throat: mucosa pink and moist. No lesions. Dentition in good repair. Neck:Supple, without lymphadenopathy. Thyroid normal, No bruits. Pulmonary/Chest: Clear to auscultation, without wheezes, rales, or rhonchi. No dullness to percussion. Cardiovascular: Regular rate and rhythm without murmurs, rubs, or gallops. Abdomen: Soft, non tender. Bowel sounds normal. No organomegaly  All four Extremities: No cyanosis, clubbing, edema, or effusions. Neurologic: No gross sensory or motor deficits. Skin: Warm and dry with good turgor. No signs of peripheral arterial or venous insufficiency. No ulcerations. No open wounds. Medical Decision Making -Laboratory:   I have independently reviewed/ordered the following labs:    CBC with Differential:   Recent Labs     10/29/22  1039 10/30/22  0557   WBC 8.9 10.0   HGB 14.7 12.6*   HCT 44.5 38.5*    152   LYMPHOPCT 10*  --    MONOPCT 4  --    BMP:   Recent Labs     10/29/22  1039 10/30/22  0557    130*   K 4.3 4.2    100   CO2 20 19*   BUN 12 13   CREATININE 1.42* 1.47*   Hepatic Function Panel:   Recent Labs     10/29/22  1039   PROT 7.0   LABALBU 3.3*   BILITOT 1.2   ALKPHOS 155*   ALT 74*   AST 84*   No results for input(s): RPR in the last 72 hours. No results for input(s): HIV in the last 72 hours. No results for input(s): BC in the last 72 hours.   Lab Results   Component Value Date/Time    MUCUS NOT REPORTED 02/16/2022 12:09 PM    RBC 3.93 10/30/2022 05:57 AM    RBC 5.14 02/24/2012 11:00 AM    TRICHOMONAS NOT REPORTED 02/16/2022 12:09 PM    WBC 10.0 10/30/2022 05:57 AM    YEAST NOT REPORTED 02/16/2022 12:09 PM    TURBIDITY Clear 10/25/2022 10:25 PM     Lab Results   Component Value Date/Time    CREATININE 1.47 10/30/2022 05:57 AM    GLUCOSE 151 10/30/2022 05:57 AM    GLUCOSE 97 04/24/2012 12:55 PM     Medical Decision Making-Imaging:     Narrative   EXAMINATION:   CT OF THE CHEST WITHOUT CONTRAST 10/26/2022 1:29 am       TECHNIQUE:   CT of the chest was performed without the administration of intravenous   contrast. Multiplanar reformatted images are provided for review. Automated   exposure control, iterative reconstruction, and/or weight based adjustment of   the mA/kV was utilized to reduce the radiation dose to as low as reasonably   achievable. COMPARISON:   CT abdomen and pelvis the previous day, CT chest on 06/27/2022       HISTORY:   ORDERING SYSTEM PROVIDED HISTORY: eval mass   TECHNOLOGIST PROVIDED HISTORY:   eval mass   Decision Support Exception - unselect if not a suspected or confirmed   emergency medical condition->Emergency Medical Condition (MA)       FINDINGS:   Mediastinum: No cardiomegaly is identified. No focal esophageal thickening   is seen. Small hiatal hernia. No mediastinal lymphadenopathy is identified. Calcified lymph nodes are noted. No thyroid mass. No pericardial effusion. Coronary artery stent is noted. Lungs/pleura: Respiratory motion artifact, however there are numerous areas   of mild peribronchial nodularity seen within the right lower lobe, most of   which demonstrate small punctate benign-appearing calcifications, and   findings felt likely related to post inflammatory/infectious changes. The   described spiculated nodule on the comparison CT abdomen demonstrates a   calcification which is more conspicuous on this study, and felt very unlikely   to represent a neoplastic process. Upper Abdomen: Cholelithiasis with potential cholecystitis noted, partially   imaged as previously discussed. Soft Tissues/Bones: No axillary or supraclavicular lymphadenopathy is   identified. No acute osseous abnormality. No acute vertebral body fracture   is identified. No osseous destructive process is identified. Impression   1.  The spiculated 11 mm nodule described on the CT abdomen study demonstrates   a more conspicuous central calcification, with multiple similar appearing   areas of peribronchial nodules with calcifications seen in the right lower   lobe, felt likely related to sequela of prior inflammatory/infectious   process, and very unlikely to represent a neoplastic process. No significant   change from June of 2022. Given the degree of clinical concern of these   findings, and follow-up recommendations as below, suggest repeat imaging in   approximately 12 months. 2. Minimal dependent atelectasis. 3. Other incidental findings as above. RECOMMENDATIONS:   Fleischner Society guidelines for follow-up and management of incidentally   detected pulmonary nodules:       Multiple Solid Nodules:       Nodule size greater than 8 mm   In a low-risk patient, CT at 3-6 months, then consider CT at 18-24 months. In a high-risk patient, CT at 3-6 months, then CT at 18-24 months. - Low risk patients include individuals with minimal or absent history of   smoking and other known risk factors. - High risk patients include individuals with a history or smoking or known   risk factors. Radiology 2017 http://pubs. rsna.org/doi/full/10.1148/radiol. 7502765064         Narrative   EXAMINATION:   CT OF THE ABDOMEN AND PELVIS WITHOUT CONTRAST 10/25/2022 11:38 pm       TECHNIQUE:   CT of the abdomen and pelvis was performed without the administration of   intravenous contrast. Multiplanar reformatted images are provided for review. Automated exposure control, iterative reconstruction, and/or weight based   adjustment of the mA/kV was utilized to reduce the radiation dose to as low   as reasonably achievable. COMPARISON:   None.        HISTORY:   ORDERING SYSTEM PROVIDED HISTORY: eval for gallstone pancreatitis   TECHNOLOGIST PROVIDED HISTORY:   eval for gallstone pancreatitis       Decision Support Exception - unselect if not a suspected or confirmed   emergency medical condition->Emergency Medical Condition (MA)   Reason for Exam: eval for gallstone pancreatitis       FINDINGS:   Lower Chest: Lad stent/coronary artery disease. 10 mm spiculated nodule   right lung base. Organs: Liver is hypodense. 15 mm lamellated gallstone noted. Gallbladder   wall thickening noted. Adrenals and pancreas normal.       GI/Bowel: Small hiatal hernia. Small large bowel normal.  Appendix normal.       Pelvis: Fat containing bilateral inguinal hernias. Peritoneum/Retroperitoneum: Calcified plaque along the aorta and its branches. Bones/Soft Tissues: Spondylosis and multilevel vacuum disc phenomena. Slight   anterior subluxation L4-5. Impression   Cholelithiasis and acute cholecystitis. Gallbladder ultrasound would be   helpful for further characterization. 10 mm spiculated nodule right lung base. RECOMMENDATIONS:   Guidelines for follow-up and management of pulmonary nodules found on abdomen   CT:       >8mm - immediate chest CT for further evaluation. Radiology 2017 http://pubs. rsna.org/doi/full/10.1148/radiol. 6926342762     Narrative   EXAMINATION:   MRI OF THE ABDOMEN WITHOUT CONTRAST AND MRCP 10/26/2022 9:47 am       TECHNIQUE:   Multiplanar multisequence MRI of the abdomen was performed without the   administration of intravenous contrast.  After initial T2 axial and coronal   images, thick slab, thin slab and 3D coronal MRCP sequences were obtained   without the administration of intravenous contrast.  MIP images are provided   for review. COMPARISON:   10/25/2022 CT abdomen/pelvis       HISTORY:   ORDERING SYSTEM PROVIDED HISTORY: cholelithiasis elevated lft's   ,?choledocholithiasis   TECHNOLOGIST PROVIDED HISTORY:   cholelithiasis elevated lft's ,?choledocholithiasis   What is the sedation requirement?->None       FINDINGS:   Gallbladder: There is cholelithiasis with a large stone measuring   approximately 19 mm in size.   There is gallbladder wall thickening measuring   approximately 7 mm in thickness. There is also pericholecystic fluid. Findings can be seen with acute cholecystitis in the proper clinical setting. Bile Ducts: There is no evidence of common bile duct dilatation. Common bile   duct measures approximately 4 mm in diameter. No intrahepatic or   extrahepatic biliary ductal dilatation. No evidence of choledocholithiasis. No evidence of common bile duct stricture or obstruction. Pancreatic Duct: No pancreatic ductal dilatation or obstruction. No   pancreatic ductal stones. No evidence of pancreatic divisum. Other:  Examination not tailored for the evaluation of the solid abdominal   organs. There is mild diffuse hepatic steatosis. No evidence of   hydronephrosis. No evidence of abdominal ascites or lymphadenopathy. Visualized bowel is grossly unremarkable. Osseous structures demonstrate no   acute abnormality. Impression   Cholelithiasis with findings suggesting acute cholecystitis. No biliary or pancreatic ductal dilatation. No evidence of   choledocholithiasis. Mild hepatic steatosis. Narrative   EXAMINATION:   TWO XRAY VIEWS OF THE CHEST       10/25/2022 9:44 pm       COMPARISON:   July 19, 2022       HISTORY:   ORDERING SYSTEM PROVIDED HISTORY: eval for pneumonia   TECHNOLOGIST PROVIDED HISTORY:   eval for pneumonia       FINDINGS:   The lungs are without acute focal process. There is no effusion or   pneumothorax. The cardiomediastinal silhouette is without acute process. The   osseous structures are without acute process. Impression   No acute process.      Medical Decision Oyllyp-[id number]:     Medical Decision Making-Other:   Note:  Labs, medications, radiologic studies were reviewed with personal review of films  Large amounts of data were reviewed  Discussed with nursing Staff, Discharge planner  Infection Control and Prevention measures reviewed  All prior entries were reviewed  Administer medications as ordered  Prognosis: Fair  Discharge planning reviewed    Thank you for allowing us to participate in the care of this patient. Please call with questions. JW Reeves    ATTESTATION:    I have discussed the case, including pertinent history and exam findings with the APRN. I have evaluated the  History, physical findings and pictures of the patient and the key elements of the encounter have been performed by me. I have reviewed the laboratory data, other diagnostic studies and discussed them with the APRN. I have updated the medical record where necessary. I agree with the assessment, plan and orders as documented by the APRN.     Jannette Sims MD.        Pager: [phone] - Office: [phone]

## 2022-10-31 NOTE — PLAN OF CARE
Problem: Skin/Tissue Integrity  Goal: Absence of new skin breakdown  Description: 1. Monitor for areas of redness and/or skin breakdown  2. Assess vascular access sites hourly  3. Every 4-6 hours minimum:  Change oxygen saturation probe site  4. Every 4-6 hours:  If on nasal continuous positive airway pressure, respiratory therapy assess nares and determine need for appliance change or resting period.   10/31/2022 0251 by Golden Wen RN  Outcome: Progressing  10/30/2022 1827 by Rodriguez Hicks RN  Outcome: Progressing     Problem: Safety - Adult  Goal: Free from fall injury  10/31/2022 0251 by Golden Wen RN  Outcome: Progressing  10/30/2022 1827 by Rodriguez Hicks RN  Outcome: Progressing     Problem: ABCDS Injury Assessment  Goal: Absence of physical injury  10/31/2022 0251 by Golden Wen RN  Outcome: Progressing  Flowsheets (Taken 10/30/2022 1930)  Absence of Physical Injury: Implement safety measures based on patient assessment  10/30/2022 1827 by Rodriguez Hicks RN  Outcome: Progressing     Problem: Chronic Conditions and Co-morbidities  Goal: Patient's chronic conditions and co-morbidity symptoms are monitored and maintained or improved  10/31/2022 0251 by Golden Wen RN  Outcome: Progressing  10/30/2022 1827 by Rodriguez Hicks RN  Outcome: Progressing     Problem: Discharge Planning  Goal: Discharge to home or other facility with appropriate resources  10/31/2022 0251 by Golden Wen RN  Outcome: Progressing  10/30/2022 1827 by Rodriguez Hicks RN  Outcome: Progressing     Problem: Pain  Goal: Verbalizes/displays adequate comfort level or baseline comfort level  10/31/2022 0251 by Golden Wen RN  Outcome: Progressing  10/30/2022 1827 by Rodriguez Hicks RN  Outcome: Progressing

## 2022-11-01 VITALS
OXYGEN SATURATION: 98 % | HEIGHT: 67 IN | SYSTOLIC BLOOD PRESSURE: 128 MMHG | RESPIRATION RATE: 17 BRPM | TEMPERATURE: 97.8 F | HEART RATE: 57 BPM | DIASTOLIC BLOOD PRESSURE: 63 MMHG | BODY MASS INDEX: 28.06 KG/M2 | WEIGHT: 178.79 LBS

## 2022-11-01 DIAGNOSIS — Z90.49 STATUS POST CHOLECYSTECTOMY: Primary | ICD-10-CM

## 2022-11-01 LAB
SARS-COV-2, RAPID: NOT DETECTED
SPECIMEN DESCRIPTION: NORMAL
SURGICAL PATHOLOGY REPORT: NORMAL

## 2022-11-01 PROCEDURE — 2580000003 HC RX 258

## 2022-11-01 PROCEDURE — 6370000000 HC RX 637 (ALT 250 FOR IP)

## 2022-11-01 PROCEDURE — C9113 INJ PANTOPRAZOLE SODIUM, VIA: HCPCS

## 2022-11-01 PROCEDURE — 99232 SBSQ HOSP IP/OBS MODERATE 35: CPT | Performed by: INTERNAL MEDICINE

## 2022-11-01 PROCEDURE — 6360000002 HC RX W HCPCS

## 2022-11-01 PROCEDURE — 51798 US URINE CAPACITY MEASURE: CPT

## 2022-11-01 PROCEDURE — 87635 SARS-COV-2 COVID-19 AMP PRB: CPT

## 2022-11-01 PROCEDURE — 51701 INSERT BLADDER CATHETER: CPT

## 2022-11-01 PROCEDURE — 6370000000 HC RX 637 (ALT 250 FOR IP): Performed by: INTERNAL MEDICINE

## 2022-11-01 PROCEDURE — 99238 HOSP IP/OBS DSCHRG MGMT 30/<: CPT | Performed by: STUDENT IN AN ORGANIZED HEALTH CARE EDUCATION/TRAINING PROGRAM

## 2022-11-01 PROCEDURE — 6370000000 HC RX 637 (ALT 250 FOR IP): Performed by: NURSE PRACTITIONER

## 2022-11-01 RX ORDER — IBUPROFEN 800 MG/1
800 TABLET ORAL 2 TIMES DAILY PRN
Qty: 10 TABLET | Refills: 0 | Status: ON HOLD | OUTPATIENT
Start: 2022-11-01

## 2022-11-01 RX ORDER — OXYCODONE HYDROCHLORIDE 5 MG/1
5 TABLET ORAL EVERY 6 HOURS PRN
Qty: 6 TABLET | Refills: 0 | Status: SHIPPED | OUTPATIENT
Start: 2022-11-01 | End: 2022-11-04

## 2022-11-01 RX ORDER — LIDOCAINE 50 MG/G
OINTMENT TOPICAL PRN
Status: DISCONTINUED | OUTPATIENT
Start: 2022-11-01 | End: 2022-11-01 | Stop reason: HOSPADM

## 2022-11-01 RX ADMIN — CARBIDOPA AND LEVODOPA 1 TABLET: 25; 100 TABLET ORAL at 08:19

## 2022-11-01 RX ADMIN — OXYCODONE 5 MG: 5 TABLET ORAL at 08:19

## 2022-11-01 RX ADMIN — LIDOCAINE: 50 OINTMENT TOPICAL at 08:29

## 2022-11-01 RX ADMIN — ENTACAPONE 200 MG: 200 TABLET, FILM COATED ORAL at 08:19

## 2022-11-01 RX ADMIN — SODIUM CHLORIDE, PRESERVATIVE FREE 40 MG: 5 INJECTION INTRAVENOUS at 08:19

## 2022-11-01 RX ADMIN — TAMSULOSIN HYDROCHLORIDE 0.4 MG: 0.4 CAPSULE ORAL at 08:19

## 2022-11-01 RX ADMIN — SODIUM CHLORIDE, PRESERVATIVE FREE 10 ML: 5 INJECTION INTRAVENOUS at 08:29

## 2022-11-01 RX ADMIN — ALLOPURINOL 100 MG: 100 TABLET ORAL at 08:18

## 2022-11-01 RX ADMIN — CLOPIDOGREL 75 MG: 75 TABLET, FILM COATED ORAL at 08:19

## 2022-11-01 RX ADMIN — GABAPENTIN 100 MG: 100 CAPSULE ORAL at 08:18

## 2022-11-01 RX ADMIN — ONDANSETRON 4 MG: 2 INJECTION INTRAMUSCULAR; INTRAVENOUS at 08:42

## 2022-11-01 RX ADMIN — LIDOCAINE: 50 OINTMENT TOPICAL at 03:09

## 2022-11-01 RX ADMIN — MAGNESIUM GLUCONATE 500 MG ORAL TABLET 400 MG: 500 TABLET ORAL at 08:18

## 2022-11-01 ASSESSMENT — PAIN SCALES - GENERAL: PAINLEVEL_OUTOF10: 7

## 2022-11-01 ASSESSMENT — PAIN DESCRIPTION - ORIENTATION: ORIENTATION: LEFT;RIGHT

## 2022-11-01 ASSESSMENT — PAIN DESCRIPTION - DESCRIPTORS: DESCRIPTORS: ACHING;DISCOMFORT

## 2022-11-01 ASSESSMENT — PAIN DESCRIPTION - LOCATION: LOCATION: ABDOMEN;LEG

## 2022-11-01 NOTE — DISCHARGE SUMMARY
Discussed with the patient and all questioned fully answered. He will call me if any problems arise. IV removed without any complications. Report called to Arthur Garcia RN at [facility].

## 2022-11-01 NOTE — DISCHARGE SUMMARY
[facility]  Office: [address], DO, Jose Khalil, DO, Marques Sidhu, DO, Radha Parada Blood, DO, Alex Galvan MD, Richard Adame MD, Anil Lei MD, Mike Talbert MD,  Gordy Kate MD, Girish Santillan MD, Kieran Layer, DO, Dre Mckeon MD,  Vaughn Scheuermann, DO, Bowen Love MD, Corby Hale MD, Dinh Wolf, DO, Zulay Velasco MD, Mray Sanders MD, Jasper Ibrahim, DO, Manuel Aragon MD, Tae Tsang MD, Johanna Boyle MD, Jessica Whyte MD, Kvng Sabillon DO, Rizwan Cordova MD, Adrien Ibarra MD, Carolann Lindsay [facility],  Chevy Hansen, CNP, Craig Sever, CNP, Gustavo Turner, CNP,  Stan Russell, [facility], Jarrod Chaney, CNP, Rosetta Mcdonnell, CNP, Yovany Reyes, CNP, Sonja Ames, CNP, Fabiano Pepper, CNP, Melisa Rojo PA-C, Sadie Bryant, CNS, Payam Salinas, [facility], Usha Ozuna, [facility], Kathrine Yo, CNP, Boni Cordova, [facility]    Discharge Summary     Patient ID: Nhung Baker  :  1940   MRN: [id number]     ACCOUNT:  [de-identified]   Patient's PCP: JW Vinson CNP  Admit Date: 10/25/2022   Discharge Date: 2022     Length of Stay: 6  Code Status:  Full Code  Admitting Physician: Mary Ellen Kaur DO  Discharge Physician: Mary Sanders MD     Active Discharge Diagnoses:     Hospital Problem Lists:  Principal Problem:    Acute cholecystitis  Active Problems:    Stage 3b chronic kidney disease (CKD) [facility])    Coronary artery disease involving native heart with angina pectoris [facility])    Gallstone pancreatitis    Calculus of gallbladder with acute cholecystitis without obstruction    Bandemia    Pulmonary nodule    MRSA carrier    TAMARA (acute kidney injury) ([facility] [address]    History of non-ST elevation myocardial infarction (NSTEMI) 2022    Postoperative retention of urine    History of coronary artery stent placement    Intracranial bleed : traumatic left sub-dural hematoma ,managed conservatively in 2011    CKD (chronic kidney disease) stage 3, GFR 30-59 ml/min (HCC)    Essential hypertension    Parkinson disease ([facility] [address]    Pulmonary hypertension, mild (HCC)  Resolved Problems:    * No resolved hospital problems. *      Admission Condition:  stable     Discharged Condition: stable    Physical Exam  Vitals and nursing note reviewed. Constitutional:       Appearance: Normal appearance. HENT:      Head: Normocephalic and atraumatic. Nose: Nose normal.   Eyes:      Extraocular Movements: Extraocular movements intact. Conjunctiva/sclera: Conjunctivae normal.      Pupils: Pupils are equal, round, and reactive to light. Cardiovascular:      Rate and Rhythm: Normal rate and regular rhythm. Pulmonary:      Effort: Pulmonary effort is normal.      Breath sounds: Normal breath sounds. Abdominal:      General: Abdomen is flat. Musculoskeletal:         General: Normal range of motion. Cervical back: Normal range of motion. Skin:     General: Skin is warm. Neurological:      General: No focal deficit present. Mental Status: He is alert and oriented to person, place, and time. Psychiatric:         Mood and Affect: Mood normal.         Behavior: Behavior normal.         Thought Content: Thought content normal.         Judgment: Judgment normal.         Hospital Stay:     Hospital Course:  Jimmy Landry is a 79 y.o. male who was admitted for the management of   Acute cholecystitis , presented to ER with Fatigue (Generalized weakness, intermittent, hx of parkinsons )    Pt admitted for acute cholecystitis   Surgery evaluated   Deemed him not to be a surgical candidate  ID evaluated as well  He was on zosyn which he completed    Pt underwent laparoscopic cholecystectomy  His post op analgesia requirements were minimal  Given his etoh abuse hx we elected not to give him protracted course of narcotics.     Had some urinary retention during hospitalization which self-resolved    Significant therapeutic interventions:     Significant Diagnostic Studies:   Labs / Micro:       Radiology:  CT ABDOMEN PELVIS WO CONTRAST Additional Contrast? None    Result Date: 10/26/2022  Cholelithiasis and acute cholecystitis. Gallbladder ultrasound would be helpful for further characterization. 10 mm spiculated nodule right lung base. RECOMMENDATIONS: Guidelines for follow-up and management of pulmonary nodules found on abdomen CT: >8mm - immediate chest CT for further evaluation. Radiology 2017 http://pubs. rsna.org/doi/full/10.1148/radiol. 9375531857     XR CHEST (2 VW)    Result Date: 10/25/2022  No acute process. CT CHEST WO CONTRAST    Result Date: 10/26/2022  1. The spiculated 11 mm nodule described on the CT abdomen study demonstrates a more conspicuous central calcification, with multiple similar appearing areas of peribronchial nodules with calcifications seen in the right lower lobe, felt likely related to sequela of prior inflammatory/infectious process, and very unlikely to represent a neoplastic process. No significant change from June of 2022. Given the degree of clinical concern of these findings, and follow-up recommendations as below, suggest repeat imaging in approximately 12 months. 2. Minimal dependent atelectasis. 3. Other incidental findings as above. RECOMMENDATIONS: Fleischner Society guidelines for follow-up and management of incidentally detected pulmonary nodules: Multiple Solid Nodules: Nodule size greater than 8 mm In a low-risk patient, CT at 3-6 months, then consider CT at 18-24 months. In a high-risk patient, CT at 3-6 months, then CT at 18-24 months. - Low risk patients include individuals with minimal or absent history of smoking and other known risk factors. - High risk patients include individuals with a history or smoking or known risk factors. Radiology 2017 http://pubs. rsna.org/doi/full/10.1148/radiol. 4312254880     MRI ABDOMEN WO CONTRAST MRCP    Result Date: 10/26/2022  Cholelithiasis with findings suggesting acute cholecystitis. No biliary or pancreatic ductal dilatation. No evidence of choledocholithiasis. Mild hepatic steatosis. Consultations:    Consults:     Final Specialist Recommendations/Findings:   IP CONSULT TO GENERAL SURGERY  IP CONSULT TO HOSPITALIST  IP CONSULT TO INFECTIOUS DISEASES  IP CONSULT TO CARDIOLOGY      The patient was seen and examined on day of discharge and this discharge summary is in conjunction with any daily progress note from day of discharge. Discharge plan:     Disposition: To a non-[facility] facility    Physician Follow Up:     Jeison Mccarty DO  [address] [place] [id number] [phone]    Follow up in 2 week(s)  For wound re-check       Requiring Further Evaluation/Follow Up POST HOSPITALIZATION/Incidental Findings:     Diet:     Activity: As tolerated    Instructions to Patient:     Discharge Medications:      Medication List        START taking these medications      oxyCODONE 5 MG immediate release tablet  Commonly known as: ROXICODONE  Take 1 tablet by mouth every 6 hours as needed for Pain for up to 3 days. tamsulosin 0.4 MG capsule  Commonly known as: FLOMAX  Take 1 capsule by mouth daily            CONTINUE taking these medications      acetaminophen 650 MG extended release tablet  Commonly known as: Tylenol 8 Hour Arthritis Pain  Take 1 tablet by mouth every 8 hours as needed for Pain     allopurinol 100 MG tablet  Commonly known as: ZYLOPRIM  TAKE 1 TABLET BY MOUTH ONCE DAILY     amitriptyline 10 MG tablet  Commonly known as: ELAVIL  Take 1 tablet by mouth nightly     atorvastatin 40 MG tablet  Commonly known as: LIPITOR  take 1 tablet by mouth once daily     butalbital-acetaminophen-caffeine -40 MG per tablet  Commonly known as: FIORICET, ESGIC  Take 1 tab prn only for severe headache. Can repeat after 4 hrs if needed. Max 2 tabs/24 hrs.  Max 4 tabs/week     carbidopa-levodopa  MG per tablet  Commonly known as: SINEMET  TAKE ONE (1) TABLET BY MOUTH FOUR (4) TIMES DAILY     clopidogrel 75 MG tablet  Commonly known as: PLAVIX  TAKE 1 TABLET BY MOUTH ONCE DAILY     diclofenac sodium 1 % Gel  Commonly known as: VOLTAREN  Apply 2 g topically 2 times daily as needed for Pain (joint pain)     entacapone 200 MG tablet  Commonly known as: COMTAN  TAKE ONE (1) TABLET BY MOUTH FOUR (4) TIMES DAILY WITH SINEMET     gabapentin 100 MG capsule  Commonly known as: NEURONTIN  TAKE 1 CAPSULE BY MOUTH 3 TIMES DAILY FOR NERVES     isosorbide mononitrate 30 MG extended release tablet  Commonly known as: IMDUR  TAKE 1 TABLET BY MOUTH ONCE DAILY FOR HYPERTENSION     magnesium oxide 400 (240 Mg) MG tablet  Commonly known as: MAG-OX  TAKE 1 TABLET BY MOUTH ONCE DAILY     metoprolol tartrate 25 MG tablet  Commonly known as: LOPRESSOR  TAKE ONE HALF (1/2) ATBLETS = 12.5MG BY MOUTH TWICE A DAY FOR HYPERTENSION     nitroGLYCERIN 0.4 MG SL tablet  Commonly known as: NITROSTAT  up to max of 3 total doses. If no relief after 1 dose, call 911. pantoprazole 40 MG tablet  Commonly known as: PROTONIX  TAKE 1 TABLET BY MOUTH ONCE DAILY FOR GERD     rOPINIRole 0.25 MG tablet  Commonly known as: REQUIP  TAKE ONE (1) TABLET BY MOUTH THREE (3) TIMES DAILY            STOP taking these medications      esomeprazole 20 MG delayed release capsule  Commonly known as: NexIUM               Where to Get Your Medications        These medications were sent to [facility], [address] [place] [address], [address] [address]      Phone: [phone]   tamsulosin 0.4 MG capsule       You can get these medications from any pharmacy    Bring a paper prescription for each of these medications  oxyCODONE 5 MG immediate release tablet         No discharge procedures on file.     Time Spent on discharge is  31 mins in patient examination, evaluation, counseling as well as medication reconciliation, prescriptions for required medications, discharge plan and follow up. Electronically signed by   Chandler Lombard, MD  11/1/2022  11:08 AM      Thank you JW Gamboa - FELIPE for the opportunity to be involved in this patient's care.

## 2022-11-01 NOTE — CARE COORDINATION
Transitional Planning:  Spoke with Gali Read at [facility]. They have precert. Will need neg covid test.    PS attending for rapid covid and signed Evone Memo. Transport set up for 8 pm.  [facility] will call around to try to move up time. Sneha informed. Paperwork faxed. Nurse [facility] informed of need for rapid covid. Per Dr. Madison Alexander, pt does not need Roxicodone. 11:00 Per [facility] transport changed to 2p,  Yordan. Gali Read ,pt and nurse [facility] informed. 11:20 HENS, COVID result and AVS faxed to [facility]. [phone].   Report # [phone]  ask for skilled unit

## 2022-11-01 NOTE — PROGRESS NOTES
[facility] [place] - Progress Note    Today's Date and Time: 11/1/2022, 10:49 AM    Impression :   Acute cholecystitis  No biliary or pancreatic ductal dilation or choledocholithiasis on MRCP  Cholelithiasis  S/P laparoscopic cholecystectomy 10-29-22  Pulmonary nodule  Transaminitis  Leukocytosis  TAMARA-resolved  Hx Parkinson's  Hx MRSA on leg wound 2015  Urinary retention    Recommendations:   Monitor off antibiotics  Completed IV Zosyn for cholecystitis   Closely monitor patient's progress    Medical Decision Making/Summary/Discussion:11/1/2022     Patient with acute cholelithiasis and cholecystitis  MRCP completed with no biliary or pancreatic ductal dilation or choledocholithiasis. S/P laparoscopic cholecystectomy 10-29-22 with findings of acute on chronic cholecystitis with significant periportal edema and inflammation  S/P laparoscopic cho;ecystectomy  Infection Control Recommendations   [place] Precautions    Antimicrobial Stewardship Recommendations   Discontinuation of therapy    Coordination of Outpatient Care:   Estimated Length of IV antimicrobials: 10-30-22  Patient will need Midline Catheter Insertion: TBD  Patient will need PICC line Insertion:BD  Patient will need: Home IV , [place],  SNF,  LTAC: TBD  Patient will need outpatient wound care:    Chief complaint/reason for consultation:   Antibiotic recommendations for acute cholecystitis    History of Present Illness:   Chuyita Ritchie is a 79y.o.-year-old male who was initially admitted on 10/25/2022. Patient seen at the request of Dr. Sarah Chatman. INITIAL HISTORY:    This patient presented to the ED on 10/25/22 with complaints of generalized weakness as well as abdominal pain and 3-4 episodes of emesis over the past couple days. Lab work revealed elevated AST/ALT, bilirubin, lipase and alk phos. A CT abdomen revealed acute cholecystitis with cholelithiasis.    A CT chest was also completed that showed a 10 mm spiculated nodule in the right lung base. General surgery was consulted and an MRCP was ordered. MRCP findings include cholelithiasis with acute cholecystitis  No biliary or pancreatic ductal dilation and no evidence of choledocholithiasis. The patient received one dose of Zosyn. On 10/26/22    IMAGING:  CT ABDOMEN PELVIS WO CONTRAST Additional Contrast? None     Result Date: 10/26/2022  Cholelithiasis and acute cholecystitis. Gallbladder ultrasound would be helpful for further characterization. 10 mm spiculated nodule right lung base. RECOMMENDATIONS: Guidelines for follow-up and management of pulmonary nodules found on abdomen CT: >8mm - immediate chest CT for further evaluation. Radiology 2017 http://pubs. rsna.org/doi/full/10.1148/radiol. [id number]      XR CHEST (2 VW)     Result Date: 10/25/2022  No acute process. MRCP 10/26/22  Impression   Cholelithiasis with findings suggesting acute cholecystitis. No biliary or pancreatic ductal dilatation. No evidence of   choledocholithiasis. Mild hepatic steatosis. CURRENT EVALUATION 11/1/2022  /63   Pulse 57   Temp 97.8 °F (36.6 °C) (Oral)   Resp 17   Ht 5' 7\" (1.702 m)   Wt 178 lb 12.7 oz (81.1 kg)   SpO2 98%   BMI 28.00 kg/m²       Afebrile  VS stable    Patient stable  No complaints  No new issues per RN    He is on room ai and is tolerating a diet. PT has been working with the patient and he has been ambulating. Underwent laparoscopic cholecystectomy 10-29-22 with findings of acute on chronic cholecystitis with significant periportal edema and inflammation  Zosyn completed on 10/30/22.     AST, ALT, ALP, Bilirubin, and Lipase are trending downwards    Discharge planning to [facility] , X rays reviewed: 11/1/2022    BUN: 21-->15-->13  Cr: 1.71-->1.35-->1.47    WBC: 13.4-->8.8-->7.7-->10.0  Hb: 15.1-->13-->13.3-->12.6  Plat: 204-->145-->158-->152    ALT: 363-->70-->21  AST: 427-->83-->48  Alk phos: 230-->143-->148  Bili: 3.1-->2.1-->1. 3  Lipase: 845-->16-->15    CRP: 45.4    Cultures:  Urine:  10/25/22: No significant growth  Blood:  10/26/22: No growth  Sputum :    Wound:    MRSA Nares:    Imaging:  CT Chest 10/25/22      CT abd/pelvis 10/25/22      Discussed with patient, RN, family. I have personally reviewed the past medical history, past surgical history, medications, social history, and family history, and I have updated the database accordingly.   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 ([facility] [address] 3/18/2011    d/t being hurt on the job    CKD (chronic kidney disease) stage 2, GFR 60-89 ml/min     CKD (chronic kidney disease) stage 3, GFR 30-59 ml/min ([facility])     Diverticulosis of colon     GERD (gastroesophageal reflux disease)     History of non-ST elevation myocardial infarction (NSTEMI) 6/2022 10/27/2022    Impaired renal function     MRSA (methicillin resistant staph aureus) culture positive 9/23/2015    leg    Osteoarthritis of knee     Left    Parkinson disease ([facility] [address]     Proteinuria     Skull fracture ([facility] [address] 3/18/2011    d/t 12-foot drop on the job    Temporary loss of eyesight     periodically, happened x7    Tubular adenoma of colon 2012, 2017    mulitple     Past Surgical  History:     Past Surgical History:   Procedure Laterality Date    CHOLECYSTECTOMY, LAPAROSCOPIC  10/29/2022    LAPROSCOPIC CHOLECYSTECTOMY, POSSIBLE OPEN    CHOLECYSTECTOMY, LAPAROSCOPIC N/A 10/29/2022    LAPROSCOPIC CHOLECYSTECTOMY performed by Mechelle Jimenez DO at [address] [place] [address]  11/02/2012    poor prep, tubular adenoma    COLONOSCOPY  09/19/2017    diverticulosis, multiple polyps as described, spot marking done, pathology-tubular adenoma x 4    HI COLSC FLX W/RMVL OF TUMOR POLYP LESION SNARE TQ N/A 09/19/2017    COLONOSCOPY POLYPECTOMY SNARE/COLD BIOPSY with tatooing and apc transverse colon performed by Roseann Walker Klely Jorgensen MD at [address] [facility] Right     rotator cuff     Medications:      clopidogrel  75 mg Oral Daily    tamsulosin  0.4 mg Oral Daily    sodium chloride flush  5-40 mL IntraVENous 2 times per day    metoprolol tartrate  12.5 mg Oral BID    pantoprazole (PROTONIX) 40 mg injection  40 mg IntraVENous Daily    allopurinol  100 mg Oral Daily    amitriptyline  10 mg Oral Nightly    carbidopa-levodopa  1 tablet Oral BID    entacapone  200 mg Oral BID    gabapentin  100 mg Oral TID    rOPINIRole  0.25 mg Oral Nightly    magnesium oxide  400 mg Oral Daily     Social History:     Social History     Socioeconomic History    Marital status: Single     Spouse name: Not on file    Number of children: Not on file    Years of education: Not on file    Highest education level: Not on file   Occupational History    Not on file   Tobacco Use    Smoking status: Former    Smokeless tobacco: Never   Vaping Use    Vaping Use: Never used   Substance and Sexual Activity    Alcohol use: No    Drug use: No    Sexual activity: Not Currently   Other Topics Concern    Not on file   Social History Narrative    Not on file     Social Determinants of Health     Financial Resource Strain: Low Risk     Difficulty of Paying Living Expenses: Not hard at all   Food Insecurity: No Food Insecurity    Worried About Running Out of Food in the Last Year: Never true    Ran Out of Food in the Last Year: Never true   Transportation Needs: No Transportation Needs    Lack of Transportation (Medical): No    Lack of Transportation (Non-Medical): No   Physical Activity: Inactive    Days of Exercise per Week: 0 days    Minutes of Exercise per Session: 0 min   Stress: Stress Concern Present    Feeling of Stress : To some extent   Social Connections: Socially Isolated    Frequency of Communication with Friends and Family: Three times a week    Frequency of Social Gatherings with Friends and Family:  Three times a week    Attends [employer] Services: Never    Active Member of Clubs or Organizations: No    Attends Club or Organization Meetings: Never    Marital Status:    Intimate Partner Violence: Not At Risk    Fear of Current or Ex-Partner: No    Emotionally Abused: No    Physically Abused: No    Sexually Abused: No   Housing Stability: Low Risk     Unable to Pay for Housing in the Last Year: No    Number of Places Lived in the Last Year: 1    Unstable Housing in the Last Year: No     Family History:     Family History   Problem Relation Age of Onset    No Known Problems Mother     No Known Problems Father       Allergies:   Dye [iodides] and Aspirin     Review of Systems:   Constitutional: No fevers or chills. No systemic complaints  Head: No headaches  Eyes: No double vision or blurry vision. No conjunctival inflammation. ENT: No sore throat or runny nose. . No hearing loss, tinnitus or vertigo. Cardiovascular: No chest pain or palpitations. No shortness of breath. No METZ  Lung: No shortness of breath or cough. No sputum production  Abdomen: No nausea, vomiting, diarrhea, or abdominal pain. Valri Dykes No cramps. Genitourinary: No increased urinary frequency, or dysuria. No hematuria. No suprapubic or CVA pain  Musculoskeletal: No muscle aches or pains. No joint effusions, swelling or deformities  Hematologic: No bleeding or bruising. Neurologic: No headache, weakness, numbness, or tingling. Integument: No rash, no ulcers. Psychiatric: No depression. Endocrine: No polyuria, no polydipsia, no polyphagia.     Physical Examination :   Patient Vitals for the past 8 hrs:   BP Temp Temp src Pulse Resp SpO2 Weight   11/01/22 0849 -- -- -- -- 17 -- --   11/01/22 0750 128/63 97.8 °F (36.6 °C) Oral 57 16 98 % --   11/01/22 0516 -- -- -- -- -- -- 178 lb 12.7 oz (81.1 kg)     General Appearance: Awake, alert, and in no apparent distress  Head:  Normocephalic, no trauma  Eyes: Pupils equal, round, reactive to light and accommodation; extraocular movements intact; sclera anicteric; conjunctivae pink. No embolic phenomena. ENT: Oropharynx clear, without erythema, exudate, or thrush. No tenderness of sinuses. Mouth/throat: mucosa pink and moist. No lesions. Dentition in good repair. Neck:Supple, without lymphadenopathy. Thyroid normal, No bruits. Pulmonary/Chest: Clear to auscultation, without wheezes, rales, or rhonchi. No dullness to percussion. Cardiovascular: Regular rate and rhythm without murmurs, rubs, or gallops. Abdomen: Soft, non tender. Bowel sounds normal. No organomegaly  All four Extremities: No cyanosis, clubbing, edema, or effusions. Neurologic: No gross sensory or motor deficits. Skin: Warm and dry with good turgor. No signs of peripheral arterial or venous insufficiency. No ulcerations. No open wounds. Medical Decision Making -Laboratory:   I have independently reviewed/ordered the following labs:    CBC with Differential:   Recent Labs     10/30/22  0557   WBC 10.0   HGB 12.6*   HCT 38.5*        BMP:   Recent Labs     10/30/22  0557   *   K 4.2      CO2 19*   BUN 13   CREATININE 1.47*     Hepatic Function Panel:   No results for input(s): PROT, LABALBU, BILIDIR, IBILI, BILITOT, ALKPHOS, ALT, AST in the last 72 hours. No results for input(s): RPR in the last 72 hours. No results for input(s): HIV in the last 72 hours. No results for input(s): BC in the last 72 hours.   Lab Results   Component Value Date/Time    MUCUS NOT REPORTED 02/16/2022 12:09 PM    RBC 3.93 10/30/2022 05:57 AM    RBC 5.14 02/24/2012 11:00 AM    TRICHOMONAS NOT REPORTED 02/16/2022 12:09 PM    WBC 10.0 10/30/2022 05:57 AM    YEAST NOT REPORTED 02/16/2022 12:09 PM    TURBIDITY Clear 10/25/2022 10:25 PM     Lab Results   Component Value Date/Time    CREATININE 1.47 10/30/2022 05:57 AM    GLUCOSE 151 10/30/2022 05:57 AM    GLUCOSE 97 04/24/2012 12:55 PM     Medical Decision Making-Imaging:     Narrative   EXAMINATION:   CT OF THE CHEST WITHOUT CONTRAST 10/26/2022 1:29 am       TECHNIQUE:   CT of the chest was performed without the administration of intravenous   contrast. Multiplanar reformatted images are provided for review. Automated   exposure control, iterative reconstruction, and/or weight based adjustment of   the mA/kV was utilized to reduce the radiation dose to as low as reasonably   achievable. COMPARISON:   CT abdomen and pelvis the previous day, CT chest on 06/27/2022       HISTORY:   ORDERING SYSTEM PROVIDED HISTORY: eval mass   TECHNOLOGIST PROVIDED HISTORY:   eval mass   Decision Support Exception - unselect if not a suspected or confirmed   emergency medical condition->Emergency Medical Condition (MA)       FINDINGS:   Mediastinum: No cardiomegaly is identified. No focal esophageal thickening   is seen. Small hiatal hernia. No mediastinal lymphadenopathy is identified. Calcified lymph nodes are noted. No thyroid mass. No pericardial effusion. Coronary artery stent is noted. Lungs/pleura: Respiratory motion artifact, however there are numerous areas   of mild peribronchial nodularity seen within the right lower lobe, most of   which demonstrate small punctate benign-appearing calcifications, and   findings felt likely related to post inflammatory/infectious changes. The   described spiculated nodule on the comparison CT abdomen demonstrates a   calcification which is more conspicuous on this study, and felt very unlikely   to represent a neoplastic process. Upper Abdomen: Cholelithiasis with potential cholecystitis noted, partially   imaged as previously discussed. Soft Tissues/Bones: No axillary or supraclavicular lymphadenopathy is   identified. No acute osseous abnormality. No acute vertebral body fracture   is identified. No osseous destructive process is identified. Impression   1.  The spiculated 11 mm nodule described on the CT abdomen study demonstrates   a more conspicuous central calcification, with multiple similar appearing   areas of peribronchial nodules with calcifications seen in the right lower   lobe, felt likely related to sequela of prior inflammatory/infectious   process, and very unlikely to represent a neoplastic process. No significant   change from June of 2022. Given the degree of clinical concern of these   findings, and follow-up recommendations as below, suggest repeat imaging in   approximately 12 months. 2. Minimal dependent atelectasis. 3. Other incidental findings as above. RECOMMENDATIONS:   Fleischner Society guidelines for follow-up and management of incidentally   detected pulmonary nodules:       Multiple Solid Nodules:       Nodule size greater than 8 mm   In a low-risk patient, CT at 3-6 months, then consider CT at 18-24 months. In a high-risk patient, CT at 3-6 months, then CT at 18-24 months. - Low risk patients include individuals with minimal or absent history of   smoking and other known risk factors. - High risk patients include individuals with a history or smoking or known   risk factors. Radiology 2017 http://pubs. rsna.org/doi/full/10.1148/radiol. 2133635120         Narrative   EXAMINATION:   CT OF THE ABDOMEN AND PELVIS WITHOUT CONTRAST 10/25/2022 11:38 pm       TECHNIQUE:   CT of the abdomen and pelvis was performed without the administration of   intravenous contrast. Multiplanar reformatted images are provided for review. Automated exposure control, iterative reconstruction, and/or weight based   adjustment of the mA/kV was utilized to reduce the radiation dose to as low   as reasonably achievable. COMPARISON:   None.        HISTORY:   ORDERING SYSTEM PROVIDED HISTORY: eval for gallstone pancreatitis   TECHNOLOGIST PROVIDED HISTORY:   eval for gallstone pancreatitis       Decision Support Exception - unselect if not a suspected or confirmed   emergency medical condition->Emergency Medical Condition (MA)   Reason for Exam: eval for gallstone pancreatitis       FINDINGS:   Lower Chest: Lad stent/coronary artery disease. 10 mm spiculated nodule   right lung base. Organs: Liver is hypodense. 15 mm lamellated gallstone noted. Gallbladder   wall thickening noted. Adrenals and pancreas normal.       GI/Bowel: Small hiatal hernia. Small large bowel normal.  Appendix normal.       Pelvis: Fat containing bilateral inguinal hernias. Peritoneum/Retroperitoneum: Calcified plaque along the aorta and its branches. Bones/Soft Tissues: Spondylosis and multilevel vacuum disc phenomena. Slight   anterior subluxation L4-5. Impression   Cholelithiasis and acute cholecystitis. Gallbladder ultrasound would be   helpful for further characterization. 10 mm spiculated nodule right lung base. RECOMMENDATIONS:   Guidelines for follow-up and management of pulmonary nodules found on abdomen   CT:       >8mm - immediate chest CT for further evaluation. Radiology 2017 http://pubs. rsna.org/doi/full/10.1148/radiol. 3701604056     Narrative   EXAMINATION:   MRI OF THE ABDOMEN WITHOUT CONTRAST AND MRCP 10/26/2022 9:47 am       TECHNIQUE:   Multiplanar multisequence MRI of the abdomen was performed without the   administration of intravenous contrast.  After initial T2 axial and coronal   images, thick slab, thin slab and 3D coronal MRCP sequences were obtained   without the administration of intravenous contrast.  MIP images are provided   for review. COMPARISON:   10/25/2022 CT abdomen/pelvis       HISTORY:   ORDERING SYSTEM PROVIDED HISTORY: cholelithiasis elevated lft's   ,?choledocholithiasis   TECHNOLOGIST PROVIDED HISTORY:   cholelithiasis elevated lft's ,?choledocholithiasis   What is the sedation requirement?->None       FINDINGS:   Gallbladder: There is cholelithiasis with a large stone measuring   approximately 19 mm in size.   There is gallbladder wall thickening measuring   approximately 7 mm in thickness. There is also pericholecystic fluid. Findings can be seen with acute cholecystitis in the proper clinical setting. Bile Ducts: There is no evidence of common bile duct dilatation. Common bile   duct measures approximately 4 mm in diameter. No intrahepatic or   extrahepatic biliary ductal dilatation. No evidence of choledocholithiasis. No evidence of common bile duct stricture or obstruction. Pancreatic Duct: No pancreatic ductal dilatation or obstruction. No   pancreatic ductal stones. No evidence of pancreatic divisum. Other:  Examination not tailored for the evaluation of the solid abdominal   organs. There is mild diffuse hepatic steatosis. No evidence of   hydronephrosis. No evidence of abdominal ascites or lymphadenopathy. Visualized bowel is grossly unremarkable. Osseous structures demonstrate no   acute abnormality. Impression   Cholelithiasis with findings suggesting acute cholecystitis. No biliary or pancreatic ductal dilatation. No evidence of   choledocholithiasis. Mild hepatic steatosis. Narrative   EXAMINATION:   TWO XRAY VIEWS OF THE CHEST       10/25/2022 9:44 pm       COMPARISON:   July 19, 2022       HISTORY:   ORDERING SYSTEM PROVIDED HISTORY: eval for pneumonia   TECHNOLOGIST PROVIDED HISTORY:   eval for pneumonia       FINDINGS:   The lungs are without acute focal process. There is no effusion or   pneumothorax. The cardiomediastinal silhouette is without acute process. The   osseous structures are without acute process. Impression   No acute process.      Medical Decision [id number]:     Medical Decision Making-Other:   Note:  Labs, medications, radiologic studies were reviewed with personal review of films  Large amounts of data were reviewed  Discussed with nursing Staff, Discharge planner  Infection Control and Prevention measures reviewed  All prior entries were reviewed  Administer medications as ordered  Prognosis: Fair  Discharge planning reviewed    Thank you for allowing us to participate in the care of this patient. Please call with questions. JW Reeves    ATTESTATION:    I have discussed the case, including pertinent history and exam findings with the APRN. I have evaluated the  History, physical findings and pictures of the patient and the key elements of the encounter have been performed by me. I have reviewed the laboratory data, other diagnostic studies and discussed them with the APRN. I have updated the medical record where necessary. I agree with the assessment, plan and orders as documented by the APRN.     Wes North MD.        Pager: [phone] - Office: [phone]

## 2022-11-01 NOTE — PLAN OF CARE
Problem: Skin/Tissue Integrity  Goal: Absence of new skin breakdown  Description: 1. Monitor for areas of redness and/or skin breakdown  2. Assess vascular access sites hourly  3. Every 4-6 hours minimum:  Change oxygen saturation probe site  4. Every 4-6 hours:  If on nasal continuous positive airway pressure, respiratory therapy assess nares and determine need for appliance change or resting period.   11/1/2022 1419 by Seda Peters RN  Outcome: Completed  11/1/2022 0410 by Chrisandra Fothergill, RN  Outcome: Progressing     Problem: Safety - Adult  Goal: Free from fall injury  11/1/2022 1419 by Seda Peters RN  Outcome: Completed  11/1/2022 0410 by Chrisandra Fothergill, RN  Outcome: Progressing     Problem: ABCDS Injury Assessment  Goal: Absence of physical injury  11/1/2022 1419 by Seda Peters RN  Outcome: Completed  11/1/2022 0410 by Chrisandra Fothergill, RN  Outcome: Progressing     Problem: Chronic Conditions and Co-morbidities  Goal: Patient's chronic conditions and co-morbidity symptoms are monitored and maintained or improved  11/1/2022 1419 by Seda Peters RN  Outcome: Completed  11/1/2022 0410 by Chrisandra Fothergill, RN  Outcome: Progressing     Problem: Discharge Planning  Goal: Discharge to home or other facility with appropriate resources  11/1/2022 1419 by Seda Peters RN  Outcome: Completed  11/1/2022 0410 by Chrisandra Fothergill, RN  Outcome: Progressing     Problem: Pain  Goal: Verbalizes/displays adequate comfort level or baseline comfort level  11/1/2022 1419 by Seda Peters RN  Outcome: Completed  11/1/2022 0410 by Chrisandra Fothergill, RN  Outcome: Progressing

## 2022-11-01 NOTE — PLAN OF CARE
Problem: Skin/Tissue Integrity  Goal: Absence of new skin breakdown  Description: 1. Monitor for areas of redness and/or skin breakdown  2. Assess vascular access sites hourly  3. Every 4-6 hours minimum:  Change oxygen saturation probe site  4. Every 4-6 hours:  If on nasal continuous positive airway pressure, respiratory therapy assess nares and determine need for appliance change or resting period.   11/1/2022 0410 by Lincoln Cote RN  Outcome: Progressing  10/31/2022 1732 by Keiry Galo RN  Outcome: Progressing     Problem: Safety - Adult  Goal: Free from fall injury  11/1/2022 0410 by Lincoln Cote RN  Outcome: Progressing  10/31/2022 1732 by Keiry Galo RN  Outcome: Progressing     Problem: ABCDS Injury Assessment  Goal: Absence of physical injury  11/1/2022 0410 by Lincoln Cote RN  Outcome: Progressing  10/31/2022 1732 by Keiry Galo RN  Outcome: Progressing     Problem: Chronic Conditions and Co-morbidities  Goal: Patient's chronic conditions and co-morbidity symptoms are monitored and maintained or improved  11/1/2022 0410 by Lincoln Cote RN  Outcome: Progressing  10/31/2022 1732 by Keiry Galo RN  Outcome: Progressing     Problem: Discharge Planning  Goal: Discharge to home or other facility with appropriate resources  11/1/2022 0410 by Lincoln Cote RN  Outcome: Progressing  10/31/2022 1732 by Keiry Galo RN  Outcome: Progressing     Problem: Pain  Goal: Verbalizes/displays adequate comfort level or baseline comfort level  11/1/2022 0410 by Lincoln Cote RN  Outcome: Progressing  10/31/2022 1732 by Keiry Galo RN  Outcome: Progressing

## 2022-11-02 ENCOUNTER — CARE COORDINATION (OUTPATIENT)
Dept: CARE COORDINATION | Age: 82
End: 2022-11-02

## 2022-11-02 NOTE — CARE COORDINATION
Noted patient was DC to SNF, will plan to follow up once home.    Yuri Deleon RN, ambulatory care manager

## 2022-11-03 ENCOUNTER — HOSPITAL ENCOUNTER (OUTPATIENT)
Age: 82
Setting detail: SPECIMEN
Discharge: HOME | End: 2022-11-03

## 2022-11-03 ENCOUNTER — CARE COORDINATION (OUTPATIENT)
Dept: CARE COORDINATION | Age: 82
End: 2022-11-03

## 2022-11-03 LAB
ANION GAP SERPL CALCULATED.3IONS-SCNC: 11 MMOL/L (ref 9–17)
BUN BLDV-MCNC: 16 MG/DL (ref 8–23)
CALCIUM SERPL-MCNC: 8.9 MG/DL (ref 8.6–10.4)
CHLORIDE BLD-SCNC: 99 MMOL/L (ref 98–107)
CO2: 26 MMOL/L (ref 20–31)
CREAT SERPL-MCNC: 1.3 MG/DL (ref 0.7–1.2)
GFR SERPL CREATININE-BSD FRML MDRD: 55 ML/MIN/1.73M2
GLUCOSE BLD-MCNC: 118 MG/DL (ref 70–99)
HCT VFR BLD CALC: 39.7 % (ref 40.7–50.3)
HEMOGLOBIN: 13 G/DL (ref 13–17)
MCH RBC QN AUTO: 31.9 PG (ref 25.2–33.5)
MCHC RBC AUTO-ENTMCNC: 32.7 G/DL (ref 28.4–34.8)
MCV RBC AUTO: 97.3 FL (ref 82.6–102.9)
NRBC AUTOMATED: 0 PER 100 WBC
PDW BLD-RTO: 13.7 % (ref 11.8–14.4)
PLATELET # BLD: 215 K/UL (ref 138–453)
PMV BLD AUTO: 9.8 FL (ref 8.1–13.5)
POTASSIUM SERPL-SCNC: 4.3 MMOL/L (ref 3.7–5.3)
RBC # BLD: 4.08 M/UL (ref 4.21–5.77)
SODIUM BLD-SCNC: 136 MMOL/L (ref 135–144)
WBC # BLD: 7.9 K/UL (ref 3.5–11.3)

## 2022-11-03 PROCEDURE — 36415 COLL VENOUS BLD VENIPUNCTURE: CPT

## 2022-11-03 PROCEDURE — P9603 ONE-WAY ALLOW PRORATED MILES: HCPCS

## 2022-11-03 PROCEDURE — 85027 COMPLETE CBC AUTOMATED: CPT

## 2022-11-03 PROCEDURE — 80048 BASIC METABOLIC PNL TOTAL CA: CPT

## 2022-11-03 NOTE — CARE COORDINATION
[facility] phoned patient to follow up on his well-being. Patient stated that he's still in pain, but believes   that it's the result of his gall bladder surgery. He  stated that he was happy to hear from the [facility].  informed the patient that she was concerned  about him. Plan of Care  [facility] will await patients' discharge from SNF for further contact.

## 2022-11-17 ENCOUNTER — CARE COORDINATION (OUTPATIENT)
Dept: CARE COORDINATION | Age: 82
End: 2022-11-17

## 2022-11-17 NOTE — CARE COORDINATION
Patient called stating that he is still at [facility] and he is doing really well. Ever since he had his gall bladder out he is doing \"so much better\". He will call ACM once DC home for help with any needs and appointments.    ACM will update PCP at patients request.

## 2022-11-24 NOTE — TELEPHONE ENCOUNTER
Writer attempts to contact patient regarding possible interest in ACP. Patient does not answer and writer leaves a message.
Normal

## 2022-11-28 ENCOUNTER — CARE COORDINATION (OUTPATIENT)
Dept: CARE COORDINATION | Age: 82
End: 2022-11-28

## 2022-11-28 ENCOUNTER — APPOINTMENT (OUTPATIENT)
Dept: GENERAL RADIOLOGY | Age: 82
DRG: 641 | End: 2022-11-28
Payer: MEDICARE

## 2022-11-28 ENCOUNTER — HOSPITAL ENCOUNTER (INPATIENT)
Age: 82
LOS: 5 days | Discharge: NURSING, REHAB OR LONG-TERM CARE | DRG: 641 | End: 2022-12-07
Attending: EMERGENCY MEDICINE | Admitting: EMERGENCY MEDICINE
Payer: MEDICARE

## 2022-11-28 DIAGNOSIS — R53.1 GENERALIZED WEAKNESS: Primary | ICD-10-CM

## 2022-11-28 PROBLEM — R11.2 NAUSEA & VOMITING: Status: ACTIVE | Noted: 2022-11-28

## 2022-11-28 LAB
ABSOLUTE EOS #: 0.07 K/UL (ref 0–0.44)
ABSOLUTE IMMATURE GRANULOCYTE: <0.03 K/UL (ref 0–0.3)
ABSOLUTE LYMPH #: 1.29 K/UL (ref 1.1–3.7)
ABSOLUTE MONO #: 0.64 K/UL (ref 0.1–1.2)
ALBUMIN SERPL-MCNC: 4.1 G/DL (ref 3.5–5.2)
ALBUMIN/GLOBULIN RATIO: 1.3 (ref 1–2.5)
ALP BLD-CCNC: 113 U/L (ref 40–129)
ALT SERPL-CCNC: 57 U/L (ref 5–41)
ANION GAP SERPL CALCULATED.3IONS-SCNC: 12 MMOL/L (ref 9–17)
AST SERPL-CCNC: 48 U/L
BASOPHILS # BLD: 1 % (ref 0–2)
BASOPHILS ABSOLUTE: <0.03 K/UL (ref 0–0.2)
BILIRUB SERPL-MCNC: 0.5 MG/DL (ref 0.3–1.2)
BILIRUBIN URINE: NEGATIVE
BUN BLDV-MCNC: 13 MG/DL (ref 8–23)
CALCIUM SERPL-MCNC: 8.7 MG/DL (ref 8.6–10.4)
CHLORIDE BLD-SCNC: 101 MMOL/L (ref 98–107)
CO2: 24 MMOL/L (ref 20–31)
COLOR: YELLOW
CREAT SERPL-MCNC: 1.16 MG/DL (ref 0.7–1.2)
EOSINOPHILS RELATIVE PERCENT: 2 % (ref 1–4)
EPITHELIAL CELLS UA: NORMAL /HPF (ref 0–5)
GFR SERPL CREATININE-BSD FRML MDRD: >60 ML/MIN/1.73M2
GLUCOSE BLD-MCNC: 100 MG/DL (ref 70–99)
GLUCOSE URINE: NEGATIVE
HCT VFR BLD CALC: 41.5 % (ref 40.7–50.3)
HEMOGLOBIN: 14 G/DL (ref 13–17)
IMMATURE GRANULOCYTES: 1 %
KETONES, URINE: NEGATIVE
LACTIC ACID, WHOLE BLOOD: 1.7 MMOL/L (ref 0.7–2.1)
LACTIC ACID, WHOLE BLOOD: 1.9 MMOL/L (ref 0.7–2.1)
LEUKOCYTE ESTERASE, URINE: NEGATIVE
LIPASE: 20 U/L (ref 13–60)
LYMPHOCYTES # BLD: 30 % (ref 24–43)
MCH RBC QN AUTO: 32 PG (ref 25.2–33.5)
MCHC RBC AUTO-ENTMCNC: 33.7 G/DL (ref 28.4–34.8)
MCV RBC AUTO: 94.7 FL (ref 82.6–102.9)
MONOCYTES # BLD: 15 % (ref 3–12)
NITRITE, URINE: NEGATIVE
NRBC AUTOMATED: 0 PER 100 WBC
PDW BLD-RTO: 13.8 % (ref 11.8–14.4)
PH UA: 7 (ref 5–8)
PLATELET # BLD: 173 K/UL (ref 138–453)
PMV BLD AUTO: 9.2 FL (ref 8.1–13.5)
POTASSIUM SERPL-SCNC: 3.9 MMOL/L (ref 3.7–5.3)
PROTEIN UA: NEGATIVE
RBC # BLD: 4.38 M/UL (ref 4.21–5.77)
RBC UA: NORMAL /HPF (ref 0–4)
SARS-COV-2, RAPID: NOT DETECTED
SEG NEUTROPHILS: 51 % (ref 36–65)
SEGMENTED NEUTROPHILS ABSOLUTE COUNT: 2.28 K/UL (ref 1.5–8.1)
SODIUM BLD-SCNC: 137 MMOL/L (ref 135–144)
SPECIFIC GRAVITY UA: 1.01 (ref 1–1.03)
SPECIMEN DESCRIPTION: NORMAL
TOTAL PROTEIN: 7.2 G/DL (ref 6.4–8.3)
TROPONIN, HIGH SENSITIVITY: 22 NG/L (ref 0–22)
TSH SERPL DL<=0.05 MIU/L-ACNC: 4.05 UIU/ML (ref 0.3–5)
TURBIDITY: CLEAR
URINE HGB: NEGATIVE
UROBILINOGEN, URINE: NORMAL
WBC # BLD: 4.3 K/UL (ref 3.5–11.3)
WBC UA: NORMAL /HPF (ref 0–5)

## 2022-11-28 PROCEDURE — 93005 ELECTROCARDIOGRAM TRACING: CPT

## 2022-11-28 PROCEDURE — 71045 X-RAY EXAM CHEST 1 VIEW: CPT

## 2022-11-28 PROCEDURE — 87040 BLOOD CULTURE FOR BACTERIA: CPT

## 2022-11-28 PROCEDURE — 99285 EMERGENCY DEPT VISIT HI MDM: CPT

## 2022-11-28 PROCEDURE — 36415 COLL VENOUS BLD VENIPUNCTURE: CPT

## 2022-11-28 PROCEDURE — 6370000000 HC RX 637 (ALT 250 FOR IP)

## 2022-11-28 PROCEDURE — 87086 URINE CULTURE/COLONY COUNT: CPT

## 2022-11-28 PROCEDURE — 85025 COMPLETE CBC W/AUTO DIFF WBC: CPT

## 2022-11-28 PROCEDURE — 87635 SARS-COV-2 COVID-19 AMP PRB: CPT

## 2022-11-28 PROCEDURE — 81001 URINALYSIS AUTO W/SCOPE: CPT

## 2022-11-28 PROCEDURE — 96375 TX/PRO/DX INJ NEW DRUG ADDON: CPT

## 2022-11-28 PROCEDURE — 6360000002 HC RX W HCPCS

## 2022-11-28 PROCEDURE — 83605 ASSAY OF LACTIC ACID: CPT

## 2022-11-28 PROCEDURE — 96365 THER/PROPH/DIAG IV INF INIT: CPT

## 2022-11-28 PROCEDURE — 80053 COMPREHEN METABOLIC PANEL: CPT

## 2022-11-28 PROCEDURE — 84443 ASSAY THYROID STIM HORMONE: CPT

## 2022-11-28 PROCEDURE — 96374 THER/PROPH/DIAG INJ IV PUSH: CPT

## 2022-11-28 PROCEDURE — 84484 ASSAY OF TROPONIN QUANT: CPT

## 2022-11-28 PROCEDURE — 83690 ASSAY OF LIPASE: CPT

## 2022-11-28 PROCEDURE — 96366 THER/PROPH/DIAG IV INF ADDON: CPT

## 2022-11-28 PROCEDURE — G0378 HOSPITAL OBSERVATION PER HR: HCPCS

## 2022-11-28 PROCEDURE — 2580000003 HC RX 258

## 2022-11-28 RX ORDER — ISOSORBIDE MONONITRATE 30 MG/1
30 TABLET, EXTENDED RELEASE ORAL DAILY
Status: DISCONTINUED | OUTPATIENT
Start: 2022-11-29 | End: 2022-12-07 | Stop reason: HOSPADM

## 2022-11-28 RX ORDER — ACETAMINOPHEN 325 MG/1
650 TABLET ORAL EVERY 4 HOURS PRN
Status: DISCONTINUED | OUTPATIENT
Start: 2022-11-28 | End: 2022-12-07 | Stop reason: HOSPADM

## 2022-11-28 RX ORDER — CLOPIDOGREL BISULFATE 75 MG/1
75 TABLET ORAL DAILY
Status: DISCONTINUED | OUTPATIENT
Start: 2022-11-29 | End: 2022-12-07 | Stop reason: HOSPADM

## 2022-11-28 RX ORDER — ENOXAPARIN SODIUM 100 MG/ML
40 INJECTION SUBCUTANEOUS DAILY
Status: DISCONTINUED | OUTPATIENT
Start: 2022-11-28 | End: 2022-12-07 | Stop reason: HOSPADM

## 2022-11-28 RX ORDER — ATORVASTATIN CALCIUM 40 MG/1
40 TABLET, FILM COATED ORAL DAILY
Status: DISCONTINUED | OUTPATIENT
Start: 2022-11-29 | End: 2022-12-07 | Stop reason: HOSPADM

## 2022-11-28 RX ORDER — SODIUM CHLORIDE 9 MG/ML
INJECTION, SOLUTION INTRAVENOUS PRN
Status: DISCONTINUED | OUTPATIENT
Start: 2022-11-28 | End: 2022-12-07 | Stop reason: HOSPADM

## 2022-11-28 RX ORDER — GABAPENTIN 100 MG/1
100 CAPSULE ORAL 3 TIMES DAILY
Status: DISCONTINUED | OUTPATIENT
Start: 2022-11-28 | End: 2022-12-07 | Stop reason: HOSPADM

## 2022-11-28 RX ORDER — ONDANSETRON 4 MG/1
4 TABLET, ORALLY DISINTEGRATING ORAL EVERY 8 HOURS PRN
Status: DISCONTINUED | OUTPATIENT
Start: 2022-11-28 | End: 2022-12-07 | Stop reason: HOSPADM

## 2022-11-28 RX ORDER — ONDANSETRON 2 MG/ML
4 INJECTION INTRAMUSCULAR; INTRAVENOUS EVERY 6 HOURS PRN
Status: DISCONTINUED | OUTPATIENT
Start: 2022-11-28 | End: 2022-12-07 | Stop reason: HOSPADM

## 2022-11-28 RX ORDER — TAMSULOSIN HYDROCHLORIDE 0.4 MG/1
0.4 CAPSULE ORAL DAILY
Status: DISCONTINUED | OUTPATIENT
Start: 2022-11-29 | End: 2022-12-07 | Stop reason: HOSPADM

## 2022-11-28 RX ORDER — 0.9 % SODIUM CHLORIDE 0.9 %
500 INTRAVENOUS SOLUTION INTRAVENOUS ONCE
Status: COMPLETED | OUTPATIENT
Start: 2022-11-28 | End: 2022-11-28

## 2022-11-28 RX ORDER — MAGNESIUM SULFATE IN WATER 40 MG/ML
2000 INJECTION, SOLUTION INTRAVENOUS ONCE
Status: COMPLETED | OUTPATIENT
Start: 2022-11-28 | End: 2022-11-28

## 2022-11-28 RX ORDER — SODIUM CHLORIDE 0.9 % (FLUSH) 0.9 %
5-40 SYRINGE (ML) INJECTION EVERY 12 HOURS SCHEDULED
Status: DISCONTINUED | OUTPATIENT
Start: 2022-11-28 | End: 2022-12-07 | Stop reason: HOSPADM

## 2022-11-28 RX ORDER — SODIUM CHLORIDE 0.9 % (FLUSH) 0.9 %
5-40 SYRINGE (ML) INJECTION PRN
Status: DISCONTINUED | OUTPATIENT
Start: 2022-11-28 | End: 2022-12-07 | Stop reason: HOSPADM

## 2022-11-28 RX ORDER — ROPINIROLE 0.25 MG/1
0.25 TABLET, FILM COATED ORAL 3 TIMES DAILY
Status: DISCONTINUED | OUTPATIENT
Start: 2022-11-28 | End: 2022-12-07 | Stop reason: HOSPADM

## 2022-11-28 RX ORDER — ENTACAPONE 200 MG/1
100 TABLET ORAL 4 TIMES DAILY
Status: DISCONTINUED | OUTPATIENT
Start: 2022-11-28 | End: 2022-12-07 | Stop reason: HOSPADM

## 2022-11-28 RX ORDER — AMITRIPTYLINE HYDROCHLORIDE 10 MG/1
10 TABLET, FILM COATED ORAL NIGHTLY
Status: DISCONTINUED | OUTPATIENT
Start: 2022-11-28 | End: 2022-12-07 | Stop reason: HOSPADM

## 2022-11-28 RX ORDER — PANTOPRAZOLE SODIUM 40 MG/1
40 TABLET, DELAYED RELEASE ORAL
Status: DISCONTINUED | OUTPATIENT
Start: 2022-11-29 | End: 2022-12-07 | Stop reason: HOSPADM

## 2022-11-28 RX ADMIN — MAGNESIUM SULFATE HEPTAHYDRATE 2000 MG: 40 INJECTION, SOLUTION INTRAVENOUS at 16:09

## 2022-11-28 RX ADMIN — ENTACAPONE 100 MG: 200 TABLET, FILM COATED ORAL at 21:49

## 2022-11-28 RX ADMIN — ACETAMINOPHEN 650 MG: 325 TABLET ORAL at 21:49

## 2022-11-28 RX ADMIN — AMITRIPTYLINE HYDROCHLORIDE 10 MG: 10 TABLET, FILM COATED ORAL at 21:50

## 2022-11-28 RX ADMIN — GABAPENTIN 100 MG: 100 CAPSULE ORAL at 21:49

## 2022-11-28 RX ADMIN — CARBIDOPA AND LEVODOPA 1 TABLET: 25; 100 TABLET ORAL at 21:50

## 2022-11-28 RX ADMIN — ROPINIROLE HYDROCHLORIDE 0.25 MG: 0.25 TABLET, FILM COATED ORAL at 21:50

## 2022-11-28 RX ADMIN — ONDANSETRON 4 MG: 2 INJECTION INTRAMUSCULAR; INTRAVENOUS at 21:47

## 2022-11-28 RX ADMIN — SODIUM CHLORIDE 500 ML: 9 INJECTION, SOLUTION INTRAVENOUS at 16:05

## 2022-11-28 RX ADMIN — METOPROLOL TARTRATE 12.5 MG: 25 TABLET ORAL at 21:50

## 2022-11-28 RX ADMIN — SODIUM CHLORIDE, PRESERVATIVE FREE 10 ML: 5 INJECTION INTRAVENOUS at 23:01

## 2022-11-28 ASSESSMENT — PAIN SCALES - GENERAL: PAINLEVEL_OUTOF10: 4

## 2022-11-28 NOTE — ED PROVIDER NOTES
[facility]     Emergency Department     Faculty Attestation    I performed a history and physical examination of the patient and discussed management with the resident. I have reviewed and agree with the residents findings including all diagnostic interpretations, and treatment plans as written at the time of my review. Any areas of disagreement are noted on the chart. I was personally present for the key portions of any procedures. I have documented in the chart those procedures where I was not present during the key portions. For Physician Assistant/ Nurse Practitioner cases/documentation I have personally evaluated this patient and have completed at least one if not all key elements of the E/M (history, physical exam, and MDM). Additional findings are as noted. Primary Care Physician: JW Louis - CNP    History: This is a 79 y.o. male who presents to the Emergency Department with complaint of fatigue. Patient presents emerged for complaint of fatigue nausea dry heaves. This been ongoing since Thanksgiving. Denies any fever. Also complains of mild abdominal pain but states this has been persistent since his surgery where he had his gallbladder removed and he was discharged home November 1, 2022. Physical:   oral temperature is 97.1 °F (36.2 °C). His blood pressure is 142/77 (abnormal) and his pulse is 72. His respiration is 19 and oxygen saturation is 97%.   Lungs clear auscultation bilateral, heart regular rate and rhythm, abdomen is soft mild epigastric tenderness, no focal neurological deficits    Impression: Fatigue, nausea vomiting abdominal pain    Plan: EKG, x-ray, CBC, CMP, troponin, lipase, urinalysis, IV fluid, antiemetic, lactic acid      (Please note that portions of this note were completed with a voice recognition program.  Efforts were made to edit the dictations but occasionally words are

## 2022-11-28 NOTE — PROGRESS NOTES
[address] [facility] [address]  PROGRESS NOTE    Shift date: 11/28/22  Shift day: Monday   Shift # 2    Room # 29/29   Name: Juliana Alexander                Pentecostal: Neris De Little 33 of Christian: Anitha Sanderson    Referral: Routine Visit    Admit Date & Time: 11/28/2022  2:33 PM    Assessment:  Juliana Alexander is a 79 y.o. male     Intervention:  Writer introduced self and title as . Patient appeared receptive to  presence and engaged in conversation about his health. Writer offered space for patient  to express feelings, needs, and concerns and provided a ministry presence. Patient stated he belongs to [facility] and did not need anyone from the [facility] contacted. Outcome:  Patient expressed gratitude for  presence while awaiting test results. Plan:  Chaplains will remain available to offer spiritual and emotional support as needed.       Electronically signed by Yvonne Duke on 11/28/2022 at 6:51 PM.  [facility]  [phone]

## 2022-11-28 NOTE — CARE COORDINATION
[facility] returned call to patient who stated \"I feel so weak, I have not eaten in 2 days, I am vomiting and I need to go back to the hospital\",  [facility] asked if he had someone to take him, he stated yes his neighbor. [facility] offered him a PCP appointment or for [facility] to call the squad but he again stated that he needs to go to the hospital and his neighbor will take him. [facility] will check on his status in 1 day.

## 2022-11-29 ENCOUNTER — APPOINTMENT (OUTPATIENT)
Dept: GENERAL RADIOLOGY | Age: 82
DRG: 641 | End: 2022-11-29
Payer: MEDICARE

## 2022-11-29 ENCOUNTER — APPOINTMENT (OUTPATIENT)
Dept: CT IMAGING | Age: 82
DRG: 641 | End: 2022-11-29
Payer: MEDICARE

## 2022-11-29 LAB
CULTURE: NORMAL
SPECIMEN DESCRIPTION: NORMAL

## 2022-11-29 PROCEDURE — G0378 HOSPITAL OBSERVATION PER HR: HCPCS

## 2022-11-29 PROCEDURE — 72100 X-RAY EXAM L-S SPINE 2/3 VWS: CPT

## 2022-11-29 PROCEDURE — 6370000000 HC RX 637 (ALT 250 FOR IP)

## 2022-11-29 PROCEDURE — 74176 CT ABD & PELVIS W/O CONTRAST: CPT

## 2022-11-29 PROCEDURE — 6360000002 HC RX W HCPCS

## 2022-11-29 PROCEDURE — 96372 THER/PROPH/DIAG INJ SC/IM: CPT

## 2022-11-29 PROCEDURE — 2580000003 HC RX 258

## 2022-11-29 RX ADMIN — ROPINIROLE HYDROCHLORIDE 0.25 MG: 0.25 TABLET, FILM COATED ORAL at 20:16

## 2022-11-29 RX ADMIN — CARBIDOPA AND LEVODOPA 1 TABLET: 25; 100 TABLET ORAL at 17:51

## 2022-11-29 RX ADMIN — DESMOPRESSIN ACETATE 40 MG: 0.2 TABLET ORAL at 09:14

## 2022-11-29 RX ADMIN — GABAPENTIN 100 MG: 100 CAPSULE ORAL at 20:16

## 2022-11-29 RX ADMIN — SODIUM CHLORIDE, PRESERVATIVE FREE 10 ML: 5 INJECTION INTRAVENOUS at 09:15

## 2022-11-29 RX ADMIN — TAMSULOSIN HYDROCHLORIDE 0.4 MG: 0.4 CAPSULE ORAL at 09:14

## 2022-11-29 RX ADMIN — ENTACAPONE 100 MG: 200 TABLET, FILM COATED ORAL at 17:50

## 2022-11-29 RX ADMIN — GABAPENTIN 100 MG: 100 CAPSULE ORAL at 13:25

## 2022-11-29 RX ADMIN — CARBIDOPA AND LEVODOPA 1 TABLET: 25; 100 TABLET ORAL at 09:13

## 2022-11-29 RX ADMIN — ISOSORBIDE MONONITRATE 30 MG: 30 TABLET ORAL at 09:13

## 2022-11-29 RX ADMIN — AMITRIPTYLINE HYDROCHLORIDE 10 MG: 10 TABLET, FILM COATED ORAL at 20:16

## 2022-11-29 RX ADMIN — METOPROLOL TARTRATE 12.5 MG: 25 TABLET ORAL at 09:12

## 2022-11-29 RX ADMIN — PANTOPRAZOLE SODIUM 40 MG: 40 TABLET, DELAYED RELEASE ORAL at 09:14

## 2022-11-29 RX ADMIN — CARBIDOPA AND LEVODOPA 1 TABLET: 25; 100 TABLET ORAL at 20:16

## 2022-11-29 RX ADMIN — ENTACAPONE 100 MG: 200 TABLET, FILM COATED ORAL at 13:25

## 2022-11-29 RX ADMIN — ROPINIROLE HYDROCHLORIDE 0.25 MG: 0.25 TABLET, FILM COATED ORAL at 09:13

## 2022-11-29 RX ADMIN — CLOPIDOGREL 75 MG: 75 TABLET, FILM COATED ORAL at 09:13

## 2022-11-29 RX ADMIN — GABAPENTIN 100 MG: 100 CAPSULE ORAL at 09:13

## 2022-11-29 RX ADMIN — ENTACAPONE 100 MG: 200 TABLET, FILM COATED ORAL at 20:15

## 2022-11-29 RX ADMIN — ENTACAPONE 100 MG: 200 TABLET, FILM COATED ORAL at 09:15

## 2022-11-29 RX ADMIN — ONDANSETRON 4 MG: 4 TABLET, ORALLY DISINTEGRATING ORAL at 17:50

## 2022-11-29 RX ADMIN — CARBIDOPA AND LEVODOPA 1 TABLET: 25; 100 TABLET ORAL at 13:25

## 2022-11-29 RX ADMIN — SODIUM CHLORIDE, PRESERVATIVE FREE 10 ML: 5 INJECTION INTRAVENOUS at 20:17

## 2022-11-29 RX ADMIN — ENOXAPARIN SODIUM 40 MG: 100 INJECTION SUBCUTANEOUS at 09:14

## 2022-11-29 RX ADMIN — ROPINIROLE HYDROCHLORIDE 0.25 MG: 0.25 TABLET, FILM COATED ORAL at 13:25

## 2022-11-29 ASSESSMENT — ENCOUNTER SYMPTOMS
SORE THROAT: 0
ABDOMINAL PAIN: 0
DIARRHEA: 0
CONSTIPATION: 0
NAUSEA: 1
SHORTNESS OF BREATH: 0
VOMITING: 1
COUGH: 0
RHINORRHEA: 0

## 2022-11-29 NOTE — CONSULTS
[facility]   Consult Note         Today's Date: 11/29/2022  Patient Name: Stone Landeros  Date of admission: 11/28/2022  2:33 PM  Patient's age: 79 y.o., 1940  Admission Dx: Nausea & vomiting [R11.2]  Generalized weakness [R53.1]    Reason for Consult:  Cardiac evaluation    Requesting Physician: Mane Wilkes MD    REASON FOR CONSULT:  Lightheadedness and fatigue    History Obtained From:  Patient, chart, staff, records    HISTORY OF PRESENT ILLNESS:      The patient is a 79 y.o. male with past medical history of single-vessel CAD with patent stent in LAD on cath 06/22, Hx of NSTEMI, HTN, T2DM, CKD, PD, hx of subdural hematoma and cholecystitis s/p lap Rosmery, who is admitted to the hospital for nausea and vomiting. Patient states that he was feeling very weak and had nausea and vomiting for the last 2 days. He says he was not able to keep down any food or drinks. He also complains of dizziness, lightheadedness and headaches. He denies diarrhea, fever and chills. He denies chest pain, palpitations and shortness of breath. He was brought into the ER by his neighbor. He was found to be hypertensive with systolic blood pressure in 140s. His EKG showed sinus rhythm with premature supraventricular complexes and incomplete right bundle branch block. His troponin were negative. He recently had laparoscopic cholecystectomy and discharged on on 11/01/22. He previously had PCI with JENN to LAD in 2018. He had an echo in 2021 which showed left ventricular hypertrophy with an EF of 60 to 65% and grade 1 diastolic dysfunction. Stress test in 2021 was negative. He was again admitted with NSTEMI in June 2022 at which time he had cardiac cath that showed Single-vessel CAD, Patent stent in ostial LAD, Mild nonobstructive CAD. He was started on Imdur. Currently patient still complains of some dizziness and nausea but no further episodes of vomiting.   His blood pressure is stable tablet TAKE ONE (1) TABLET BY MOUTH FOUR (4) TIMES DAILY 8/9/22   JW Salinas CNP   rOPINIRole (REQUIP) 0.25 MG tablet TAKE ONE (1) TABLET BY MOUTH THREE (3) TIMES DAILY 8/8/22   JW Salinas CNP   atorvastatin (LIPITOR) 40 MG tablet take 1 tablet by mouth once daily 8/4/22   JW Salinas CNP   entacapone (COMTAN) 200 MG tablet TAKE ONE (1) TABLET BY MOUTH FOUR (4) TIMES DAILY WITH SINEMET 6/21/22   Jaime Washington MD   nitroGLYCERIN (NITROSTAT) 0.4 MG SL tablet up to max of 3 total doses. If no relief after 1 dose, call 911. 6/9/22   JW Christian NP   magnesium oxide (MAG-OX) 400 (240 Mg) MG tablet TAKE 1 TABLET BY MOUTH ONCE DAILY 5/23/22   JW Salinas CNP   diclofenac sodium (VOLTAREN) 1 % GEL Apply 2 g topically 2 times daily as needed for Pain (joint pain) 5/9/22   JW Smith NP   amitriptyline (ELAVIL) 10 MG tablet Take 1 tablet by mouth nightly 4/5/22   Baylee Javier MD   clopidogrel (PLAVIX) 75 MG tablet TAKE 1 TABLET BY MOUTH ONCE DAILY  11/5/21   JW Kirkland CNP   allopurinol (ZYLOPRIM) 100 MG tablet TAKE 1 TABLET BY MOUTH ONCE DAILY  11/5/21   JW Kirkland CNP   acetaminophen (TYLENOL 8 HOUR ARTHRITIS PAIN) 650 MG extended release tablet Take 1 tablet by mouth every 8 hours as needed for Pain 6/14/21   JW Salinas CNP   butalbital-acetaminophen-caffeine (FIORICET, ESGIC) -40 MG per tablet Take 1 tab prn only for severe headache. Can repeat after 4 hrs if needed. Max 2 tabs/24 hrs.  Max 4 tabs/week 6/14/21   JW Salinas CNP       sodium chloride flush, 5-40 mL, IntraVENous, 2 times per day    enoxaparin, 40 mg, SubCUTAneous, Daily    amitriptyline, 10 mg, Oral, Nightly    atorvastatin, 40 mg, Oral, Daily    carbidopa-levodopa, 1 tablet, Oral, 4x Daily    entacapone, 100 mg, Oral, 4x Daily    gabapentin, 100 mg, Oral, TID    isosorbide mononitrate, 30 mg, Oral, Daily    metoprolol tartrate, 12.5 mg, Oral, BID    pantoprazole, 40 mg, Oral, QAM AC    rOPINIRole, 0.25 mg, Oral, TID    tamsulosin, 0.4 mg, Oral, Daily    clopidogrel, 75 mg, Oral, Daily      Allergies:  Dye [iodides] and Aspirin    Social History:   reports that he has quit smoking. He has never used smokeless tobacco. He reports that he does not drink alcohol and does not use drugs. Family History: family history includes No Known Problems in his father and mother. No h/o sudden cardiac death. REVIEW OF SYSTEMS:    Constitutional: there has been no unanticipated weight loss. There's been No change in energy level, No change in activity level. Eyes: No visual changes or diplopia. No scleral icterus. ENT: No Headaches, hearing loss or vertigo. No mouth sores or sore throat. Cardiovascular: per HPI  Respiratory: per HPI  Gastrointestinal: No abdominal pain, appetite loss, blood in stools. No change in bowel or bladder habits. Genitourinary: No dysuria, trouble voiding, or hematuria. Musculoskeletal:  No gait disturbance, No weakness or joint complaints. Integumentary: No rash or pruritis. Neurological: No headache, diplopia, change in muscle strength, numbness or tingling. No change in gait, balance, coordination, mood, affect, memory, mentation, behavior. Psychiatric: No anxiety, or depression. Endocrine: No temperature intolerance. No excessive thirst, fluid intake, or urination. No tremor. Hematologic/Lymphatic: No abnormal bruising or bleeding, blood clots or swollen lymph nodes. Allergic/Immunologic: No nasal congestion or hives. PHYSICAL EXAM:      BP (!) 150/83   Pulse 70   Temp 97.9 °F (36.6 °C) (Oral)   Resp 18   Ht 5' 7\" (1.702 m)   Wt 176 lb (79.8 kg)   SpO2 97%   BMI 27.57 kg/m²    Constitutional and General Appearance: alert, cooperative, no distress and appears stated age  HEENT: PERRL, no cervical lymphadenopathy.   mucosa  Respiratory:  Normal excursion and expansion without use of accessory muscles  Resp Auscultation: Good respiratory effort. No for increased work of breathing. On auscultation: clear to auscultation bilaterally  Cardiovascular: The apical impulse is not displaced  Heart tones are crisp and normal. regular S1 and S2.  Jugular venous pulsation Normal  Abdomen:   No masses or tenderness  Bowel sounds present  Extremities:   No Cyanosis or Clubbing   Lower extremity edema: No   Skin: Warm and dry  Neurological:  Alert and oriented. Moves all extremities well  No abnormalities of mood, affect, memory, mentation, or behavior are noted    Labs:     CBC:   Recent Labs     11/28/22  1626   WBC 4.3   HGB 14.0   HCT 41.5        BMP:   Recent Labs     11/28/22  1617      K 3.9   CO2 24   BUN 13   CREATININE 1.16   LABGLOM >60   GLUCOSE 100*     BNP: No results for input(s): BNP in the last 72 hours. PT/INR: No results for input(s): PROTIME, INR in the last 72 hours. APTT:No results for input(s): APTT in the last 72 hours. CARDIAC ENZYMES:No results for input(s): CKTOTAL, CKMB, CKMBINDEX, TROPONINI in the last 72 hours.   FASTING LIPID PANEL:  Lab Results   Component Value Date/Time    HDL 34 06/18/2022 06:01 AM    LDLCALC 58 01/08/2018 12:00 AM    TRIG 97 06/18/2022 06:01 AM     LIVER PROFILE:  Recent Labs     11/28/22  1617   AST 48*   ALT 57*   LABALBU 4.1     Troponins: Invalid input(s): TROPONIN     Other Current Problems  Patient Active Problem List   Diagnosis    Temporary loss of eyesight    Syncope : posterior circulation stroke vs Neurocardiogenic syncope     Intracranial bleed : traumatic left sub-dural hematoma ,managed conservatively in 2011    Headache    CKD (chronic kidney disease) stage 3, GFR 30-59 ml/min ([facility])    Depression    Hyperlipidemia    Microhematuria    Microalbuminuria    Essential hypertension    Generalized abdominal pain    Tubular adenoma of colon    Diverticulosis of colon    History of skull fracture    Nausea    Pure hypercholesterolemia    Prediabetes    Parkinson disease ([facility] [address]    Chronic post-traumatic headache, not intractable    Fall (on) (from) other stairs and steps, initial encounter    Strain of iliopsoas muscle, initial encounter    Chronic pain of left knee    Closed fracture of second toe of right foot    Closed fracture of second toe of left foot    Abnormal ECG    Cerebrovascular accident ([facility] [address]    Chest pain, unspecified    Hematoma of scalp    Left ventricular hypertrophy    Mild aortic valve regurgitation    Pulmonary hypertension, mild (HCC)    Lumbar radiculopathy    Dizziness    Hyponatremia    Vomiting    General weakness    Overweight (BMI 25.0-29. 9)    Frequent falls    Symptomatic bradycardia    Unspecified inflammatory spondylopathy, 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[facility] [address]    Stage 3b chronic kidney disease (CKD) (HCC)    Chronic pain of multiple joints    Multiple comorbid conditions    Gout    Nerve pain    NSTEMI (non-ST elevated myocardial infarction) ([facility] [address]    History of subdural hematoma    Coronary artery disease involving native heart with angina pectoris ([facility])    Lumbar facet arthropathy    Spinal stenosis of lumbar region without neurogenic claudication    Generalized weakness    Lumbosacral spondylosis without myelopathy    Gallstone pancreatitis    Calculus of gallbladder with acute cholecystitis without obstruction    Bandemia    Pulmonary nodule    MRSA carrier    Acute cholecystitis    TAMARA (acute kidney injury) ([facility] [address]    History of non-ST elevation myocardial infarction (NSTEMI) 6/2022    Postoperative retention of urine    History of coronary artery stent placement    Nausea & vomiting     EKG (11/28/2022): Sinus rhythm with premature supraventricular complexes  Incomplete right bundle branch block  Possible lateral infarct, age indeterminate    ECHO (09/02/21):  Left ventricle is normal in size. Mild left ventricular hypertrophy.   Global left ventricular systolic function is normal. Estimated LV EF 60-65 %.  Grade I (mild) left ventricular diastolic dysfunction. Mild aortic insufficiency. Mild mitral regurgitation. Mild tricuspid regurgitation. Mild pulmonary hypertension. Estimated right ventricular systolic pressure is 82MLQD. Stress Test (09/02/2021):  No definitive scintigraphic evidence for reversible ischemia or infarct  Left ventricular ejection fraction of 59%  No evidence of ischemia on the EKG portion of exam  Risk stratification: Low risk    Cardiac Cath (06/08/22): 1. Single vessel coronary artery disease. 2. Patent stent in ostial LAD   3. Mild Nonobstructive CAD otherwise   4. LV gram not done due to chronic renal insufficiency      Recommendations    Routine Post Diagnostic Cath orders. Medical therapy as needed. Add Imdur. Risk factor modification. IMPRESSION:  Dizziness and nausea secondary to volume depletion due to vomiting. No chest pain, shortness of breath and palpitations. EKG showed sinus rhythm with premature supraventricular complexes and incomplete RBBB, HS troponin negative. Single-vessel CAD s/p JENN to LAD in 2018 -recent cath 6/22 showed nonobstructive CAD, on Imdur, Plavix and statin  Essential hypertension -on metoprolol 25 mg twice daily  Type II DM   Hx of subdural hematoma 2011  CKD stage II  Parkinson's disease -on Sinemet, ropinirole and entacapone        Recommendations:    Replace electrolytes as needed keep K > 4 and Mg > 2  Continue Imdur, Plavix, statin and Lopressor. Outpatient follow-up with cardiology after acute issues resolve. Recommendations to follow      Discussed with patient and Nurse. Berhane Pritchett MD  Internal Medicine Resident, PGY-1  [address] [place] [address].  5:54 AM on 11/29/2022         Attending note,   Patient was seen and examined agree with the evaluation and the plan documented above. Presented with dizziness and vomiting. Dehydration. He feels better now. No chest pain or shortness of breath. Rhythm strip showing artifact. EKG is normal sinus rhythm with nonspecific ST changes. Has history of coronary disease stable. Continue current medications IV hydration check orthostatics. No further cardiac work-up is needed at the present time.

## 2022-11-29 NOTE — H&P
[facility]  CDU / [address] [facility] NOTE     Pt Name: Juliana Alexander  MRN: [id number]  [place] 1940  Date of evaluation: 11/29/22  Patient's PCP is :  JW Ghosh CNP    CHIEF COMPLAINT       Chief Complaint   Patient presents with    Fatigue         HISTORY OF [place] Frank is a 79 y.o. male who presents with fatigue and lightheadedness x2 days. Patient recently had cholecystectomy. He reports an initial improvement, until last Tuesday when he has developed nausea and vomiting. He states he has been unable to tolerate oral intake. He denies any chest pain, shortness of breath, fever, chills he reports normal bowel movements and no difficulty with urination. His work-up in the emergency department was positive only for mild elevation in AST/ALT. Patient was unable to tolerate oral intake and therefore was admitted to observation service for further monitoring as well as cardiac evaluation. On assessment this morning, the patient describes radiating leg pain that precedes the weakness to his bilateral lower extremities. He states he feels mildly nauseous at th this time, however was able to eat breakfast.    Location/Symptom: Fatigue, lightheadedness, nausea, vomiting  Timing/Onset: 2 days of fatigue and lightheadedness, 1 week of nausea vomiting  Provocation: None identified  Quality: N/A  Radiation: N/A  Severity: N/A  Timing/Duration: Intermittent  Modifying Factors: None identifiable by the patient    REVIEW OF SYSTEMS       Review of Systems   Constitutional:  Positive for fatigue. Negative for chills and fever. HENT:  Negative for rhinorrhea and sore throat. Eyes:  Negative for visual disturbance. Respiratory:  Negative for cough and shortness of breath. Cardiovascular:  Negative for chest pain and leg swelling. Gastrointestinal:  Positive for nausea and vomiting. Negative for abdominal pain, constipation and diarrhea. Endocrine: Negative for polyuria. Genitourinary:  Negative for dysuria, frequency and hematuria. Musculoskeletal:  Negative for arthralgias, myalgias and neck pain. Skin:  Negative for pallor. Neurological:  Positive for light-headedness. Negative for numbness and headaches. Patient reports leg weakness. Psychiatric/Behavioral:  Negative for behavioral problems. (PQRS) Advance directives on face sheet per hospital policy. 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 I have reviewed the past medical history with the patient and it is pertinent to this complaint. SURGICAL HISTORY      has a past surgical history that includes Colonoscopy (11/02/2012); shoulder surgery (Right); pr colsc flx w/rmvl of tumor polyp lesion snare tq (N/A, 09/19/2017); Colonoscopy (09/19/2017); Cholecystectomy, laparoscopic (10/29/2022); and Cholecystectomy, laparoscopic (N/A, 10/29/2022). I have reviewed and agree with Surgical History entered and it is pertinent to this complaint.      CURRENT MEDICATIONS     sodium chloride flush 0.9 % injection 5-40 mL, 2 times per day  sodium chloride flush 0.9 % injection 5-40 mL, PRN  0.9 % sodium chloride infusion, PRN  enoxaparin (LOVENOX) injection 40 mg, Daily  acetaminophen (TYLENOL) tablet 650 mg, Q4H PRN  ondansetron (ZOFRAN-ODT) disintegrating tablet 4 mg, Q8H PRN Or  ondansetron (ZOFRAN) injection 4 mg, Q6H PRN  amitriptyline (ELAVIL) tablet 10 mg, Nightly  atorvastatin (LIPITOR) tablet 40 mg, Daily  carbidopa-levodopa (SINEMET)  MG per tablet 1 tablet, 4x Daily  entacapone (COMTAN) tablet 100 mg, 4x Daily  gabapentin (NEURONTIN) capsule 100 mg, TID  isosorbide mononitrate (IMDUR) extended release tablet 30 mg, Daily  metoprolol tartrate (LOPRESSOR) tablet 12.5 mg, BID  pantoprazole (PROTONIX) tablet 40 mg, QAM AC  rOPINIRole (REQUIP) tablet 0.25 mg, TID  tamsulosin (FLOMAX) capsule 0.4 mg, Daily  clopidogrel (PLAVIX) tablet 75 mg, Daily        All medication charted and reviewed. ALLERGIES     is allergic to dye [iodides] and aspirin. FAMILY HISTORY     He indicated that the status of his mother is unknown. He indicated that the status of his father is unknown.     family history includes No Known Problems in his father and mother. The patient denies any pertinent family history. I have reviewed and agree with the family history entered. I have reviewed the Family History and it is not significant to the case    SOCIAL HISTORY      reports that he has quit smoking. He has never used smokeless tobacco. He reports that he does not drink alcohol and does not use drugs. I have reviewed and agree with all Social.  There are no concerns for substance abuse/use. PHYSICAL EXAM     INITIAL VITALS:  height is 5' 7\" (1.702 m) and weight is 176 lb (79.8 kg). His oral temperature is 97.5 °F (36.4 °C). His blood pressure is 144/85 (abnormal) and his pulse is 75. His respiration is 18 and oxygen saturation is 98%. Physical Exam  Constitutional:       General: He is not in acute distress. Appearance: Normal appearance. He is not toxic-appearing. HENT:      Head: Normocephalic and atraumatic. Nose: No congestion or rhinorrhea.       Mouth/Throat:      Mouth: Mucous membranes are moist.   Eyes:      Conjunctiva/sclera: Conjunctivae normal.      Pupils: Pupils are equal, round, and reactive to light. Cardiovascular:      Rate and Rhythm: Normal rate and regular rhythm. Pulses: Normal pulses. Heart sounds: No murmur heard. Pulmonary:      Effort: Pulmonary effort is normal. No respiratory distress. Breath sounds: Normal breath sounds. No wheezing. Abdominal:      General: Bowel sounds are normal. There is no distension. Palpations: Abdomen is soft. Tenderness: There is no abdominal tenderness. There is no guarding or rebound. Musculoskeletal:      Right lower leg: No edema. Left lower leg: No edema. Skin:     General: Skin is warm and dry. Neurological:      Mental Status: He is alert and oriented to person, place, and time. Psychiatric:         Mood and Affect: Mood normal.         Behavior: Behavior normal.         Judgment: Judgment normal.           DIFFERENTIAL DIAGNOSIS/MDM:     DDx: Postop complication, sciatica, anemia, structural cardiac abnormality, electrolyte abnormality    DIAGNOSTIC RESULTS       RADIOLOGY:   I directly visualized the following  images and reviewed the radiologist interpretations:    XR CHEST PORTABLE    Result Date: 11/28/2022  EXAMINATION: ONE XRAY VIEW OF THE CHEST 11/28/2022 3:52 pm COMPARISON: 10/25/2022 HISTORY: ORDERING SYSTEM PROVIDED HISTORY: Altered Mental Status TECHNOLOGIST PROVIDED HISTORY: Altered Mental Status FINDINGS: Cardiomediastinal silhouette is normal in size. There is no pleural effusion or pneumothorax. There is no pulmonary consolidation. There is no acute osseous abnormality. No acute cardiopulmonary abnormality. LABS:  I have reviewed and interpreted all available lab results.   Labs Reviewed   CBC WITH AUTO DIFFERENTIAL - Abnormal; Notable for the following components:       Result Value    Monocytes 15 (*)     Immature Granulocytes 1 (*)     All other components within normal limits   COMPREHENSIVE METABOLIC PANEL - Abnormal; Notable for the following components:    Glucose 100 (*)     ALT 57 (*)     AST 48 (*)     All other components within normal limits   CULTURE, BLOOD 1   CULTURE, BLOOD 1   COVID-19, RAPID   CULTURE, URINE   LACTIC ACID   LACTIC ACID   TSH WITH REFLEX   TROPONIN   URINALYSIS WITH MICROSCOPIC   LIPASE   POCT GLUCOSE       SCREENING TOOLS:    HEART Risk Score for Chest Pain Patients  History and Physical Exam Suspicion Level  (Nausea, Vomiting, Diaphoresis, Radiation, Exertion)  Slightly Suspicious (0 pts)  Moderately Suspicious (1 pt)  Highly Suspicious (2 pts)  EKG Interpretation  Normal (0 pts)  Non-Specific Repolarization Disturbance (1 pt)  Significant ST-Depression (2 pts)  Age of Patient (in years)  = 39 (0 pts)  46-64 (1 pt)  = 65 (2 pts)  Risk Factors  No Risk Factors (0 pts)  1-2 Risk Factors (1 pt)  = 3 Risk Factors (2 pts)  Risk Factors Include:  Hypercholesterolemia  Hypertension  Diabetes Mellitus  Cigarette smoking  Positive family history  Obesity  CAD  (SLE, CKDz, HIV, Cocaine abuse)  Troponin Levels  = Normal Limit (0 pts)  1-3 Times Normal Limit (1 pt)  > 3 Times Normal Limit (2 pts)  TOTAL:    Percent Risk for Major Adverse Cardiac Event (MACE)  0-3 pts indicates low risk for MACE   2.5% (DISCHARGE)   4-7 pts indicates moderate risk for MACE  20.3% (OBS)  8-10 pts indicates high risk for MACE  72.7% (EARLY INVASIVE TX)    CDU PAULETTE / Ganga Rodriguez is a 79 y.o. male who presents with weakness, fatigue, nausea, and vomiting    Nausea, vomiting, fatigue  Patient is postop approximately 1 month from a laparoscopic cholecystectomy with Dr. Gage Elizalde   At this time, the patient appears to be tolerating oral intake moderately well. He reports some persistent nausea. We will continue antiemetics. Given the patient's age, and recent surgical history will obtain a noncontrast CT of the abdomen in the setting of a contrast allergy.   As the patient is a postop patient of Dr. Mario Roberts, will consult general surgery for further management recommendations    2. Weakness  Patient reports lower extremity weakness  Symptoms consistent with sciatic pain  Will obtain lumbar x-rays to assess for any pathologic fractures. We will also order PT OT eval's given the patient's age and presentation. Continue home medications and pain control  Monitor vitals, labs, and imaging  DISPO: pending consults and clinical improvement    CONSULTS:    IP CONSULT TO CARDIOLOGY    PROCEDURES:  Not indicated       PATIENT REFERRED TO:    No follow-up provider specified. --  Abril Cordova DO   Emergency Medicine Resident     This dictation was generated by voice recognition computer software. Although all attempts are made to edit the dictation for accuracy, there may be errors in the transcription that are not intended.

## 2022-11-29 NOTE — CONSULTS
General Surgery:  Consult Note        PATIENT NAME: Jose De Jesus Tapia OF BIRTH: 1940    ADMISSION DATE: 11/28/2022  2:33 PM     Admitting Provider: Dr Sarkis Queen Physician: Dr Dean Yaneth: 11/29/2022    Chief Complaint:  Nausea, vomiting, left leg pain    Consult Regarding:  N/V after cholecystectomy    HISTORY OF PRESENT ILLNESS:  The patient is a 79 y.o. male with extensive history as listed below who is status post laparoscopic cholecystectomy on 10/27/2022 for gallstone pancreatitis. The patient reports that initially he felt better after surgery but has had persistent nausea and vomiting since that time. States that he has been voiding appropriately. Reports normal bowel function. Denies diarrhea. The patient says he is unable to tolerate any oral intake but did tolerate breakfast this morning. He also endorses left lower extremity pain which radiates from his buttock to his left knee. Denies fever, chills, chest pain, shortness of breath.     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 ([facility] 3/18/2011    d/t being hurt on the job    CKD (chronic kidney disease) stage 2, GFR 60-89 ml/min     CKD (chronic kidney disease) stage 3, GFR 30-59 ml/min ([facility])     Diverticulosis of colon     GERD (gastroesophageal reflux disease)     History of non-ST elevation myocardial infarction (NSTEMI) 6/2022 10/27/2022    Impaired renal function     MRSA (methicillin resistant staph aureus) culture positive 9/23/2015    leg    Osteoarthritis of knee     Left    Parkinson disease ([address]     Proteinuria     Skull fracture ([facility] [address] [address] 3/18/2011    d/t 12-foot drop on the job    Temporary loss of eyesight     periodically, happened x7    Tubular adenoma of colon 2012, 2017    mulitple       Past Surgical History:        Procedure Laterality Date    CHOLECYSTECTOMY, LAPAROSCOPIC  10/29/2022    LAPROSCOPIC CHOLECYSTECTOMY, POSSIBLE OPEN    CHOLECYSTECTOMY, LAPAROSCOPIC N/A 10/29/2022    LAPROSCOPIC CHOLECYSTECTOMY performed by Dhaval Negron DO at [address]  11/02/2012    poor prep, tubular adenoma    COLONOSCOPY  09/19/2017    diverticulosis, multiple polyps as described, spot marking done, pathology-tubular adenoma x 4    ND COLSC FLX W/RMVL OF TUMOR POLYP LESION SNARE TQ N/A 09/19/2017    COLONOSCOPY POLYPECTOMY SNARE/COLD BIOPSY with tatooing and apc transverse colon performed by Javi Amaral MD at [id number] [facility] Right     rotator cuff       Medications Prior to Admission:   Medications Prior to Admission: ibuprofen (ADVIL;MOTRIN) 800 MG tablet, Take 1 tablet by mouth 2 times daily as needed for Pain  tamsulosin (FLOMAX) 0.4 MG capsule, Take 1 capsule by mouth daily  gabapentin (NEURONTIN) 100 MG capsule, TAKE 1 CAPSULE BY MOUTH 3 TIMES DAILY FOR NERVES  pantoprazole (PROTONIX) 40 MG tablet, TAKE 1 TABLET BY MOUTH ONCE DAILY FOR GERD  isosorbide mononitrate (IMDUR) 30 MG extended release tablet, TAKE 1 TABLET BY MOUTH ONCE DAILY FOR HYPERTENSION  metoprolol tartrate (LOPRESSOR) 25 MG tablet, TAKE ONE HALF (1/2) ATBLETS = 12.5MG BY MOUTH TWICE A DAY FOR HYPERTENSION  carbidopa-levodopa (SINEMET)  MG per tablet, TAKE ONE (1) TABLET BY MOUTH FOUR (4) TIMES DAILY  rOPINIRole (REQUIP) 0.25 MG tablet, TAKE ONE (1) TABLET BY MOUTH THREE (3) TIMES DAILY  atorvastatin (LIPITOR) 40 MG tablet, take 1 tablet by mouth once daily  entacapone (COMTAN) 200 MG tablet, TAKE ONE (1) TABLET BY MOUTH FOUR (4) TIMES DAILY WITH SINEMET  nitroGLYCERIN (NITROSTAT) 0.4 MG SL tablet, up to max of 3 total doses. If no relief after 1 dose, call 911.   magnesium oxide (MAG-OX) 400 (240 Mg) MG tablet, TAKE 1 TABLET BY MOUTH ONCE DAILY  diclofenac sodium (VOLTAREN) 1 % GEL, Apply 2 g topically 2 times daily as needed for Pain (joint pain)  amitriptyline (ELAVIL) 10 MG tablet, Take 1 tablet by mouth nightly  clopidogrel (PLAVIX) 75 MG tablet, TAKE 1 TABLET BY MOUTH ONCE DAILY   allopurinol (ZYLOPRIM) 100 MG tablet, TAKE 1 TABLET BY MOUTH ONCE DAILY   acetaminophen (TYLENOL 8 HOUR ARTHRITIS PAIN) 650 MG extended release tablet, Take 1 tablet by mouth every 8 hours as needed for Pain  butalbital-acetaminophen-caffeine (FIORICET, ESGIC) -40 MG per tablet, Take 1 tab prn only for severe headache. Can repeat after 4 hrs if needed. Max 2 tabs/24 hrs. Max 4 tabs/week    Allergies:  Dye [iodides] and Aspirin    Social History:   Social History     Socioeconomic History    Marital status: Single     Spouse name: Not on file    Number of children: Not on file    Years of education: Not on file    Highest education level: Not on file   Occupational History    Not on file   Tobacco Use    Smoking status: Former    Smokeless tobacco: Never   Vaping Use    Vaping Use: Never used   Substance and Sexual Activity    Alcohol use: No    Drug use: No    Sexual activity: Not Currently   Other Topics Concern    Not on file   Social History Narrative    Not on file     Social Determinants of Health     Financial Resource Strain: Low Risk     Difficulty of Paying Living Expenses: Not hard at all   Food Insecurity: No Food Insecurity    Worried About Running Out of Food in the Last Year: Never true    [address] in the Last Year: Never true   Transportation Needs: No Transportation Needs    Lack of Transportation (Medical): No    Lack of Transportation (Non-Medical): No   Physical Activity: Inactive    Days of Exercise per Week: 0 days    Minutes of Exercise per Session: 0 min   Stress: Stress Concern Present    Feeling of Stress : To some extent   Social Connections: Socially Isolated    Frequency of Communication with Friends and Family: Three times a week    Frequency of Social Gatherings with Friends and Family:  Three times a week    Attends [employer] Services: Never    Active Member of Clubs or Organizations: No    Attends Club or Organization Meetings: Never    Marital Status:    Intimate Partner Violence: Not At Risk    Fear of Current or Ex-Partner: No    Emotionally Abused: No    Physically Abused: No    Sexually Abused: No   Housing Stability: Low Risk     Unable to Pay for Housing in the Last Year: No    Number of Places Lived in the Last Year: 1    Unstable Housing in the Last Year: No       Family History:       Problem Relation Age of Onset    No Known Problems Mother     No Known Problems Father        REVIEW OF SYSTEMS:    CONSTITUTIONAL: Denies recent weight loss, fatigue, fevers, chills. HEENT: Denies rhinorrhea, dysphagia, odynphagia. CARDIOVASCULAR: Denies history of MI, recent chest pain. RESPIRATORY: Denies recent history of shortness of breath or history of PE. GASTROINTESTINAL: + nausea  GENITOURINARY: Denies increased frequency or dysuria. HEMATOLOGIC/LYMPHATIC: Denies history of anemia or DVTs. ENDOCRINE: Denies history of thyroid problems or diabetes. NEURO: + parkinson  Review of systems negative unless listed above. PHYSICAL EXAM:    VITALS:  BP (!) 144/85   Pulse 75   Temp 97.5 °F (36.4 °C) (Oral)   Resp 18   Ht 5' 7\" (1.702 m)   Wt 176 lb (79.8 kg)   SpO2 98%   BMI 27.57 kg/m²   INTAKE/OUTPUT:     Intake/Output Summary (Last 24 hours) at 11/29/2022 1520  Last data filed at 11/28/2022 1818  Gross per 24 hour   Intake --   Output 375 ml   Net -375 ml       CONSTITUTIONAL:  Awake and alert  HEENT: Normocephalic/atraumatic, without obvious abnormality  NECK:  Supple, symmetrical, trachea midline   CARDIOVASCULAR: Regular rate and rhythm without murmurs. LUNGS: Unlabored, room air  ABDOMEN: Soft, nondistended, minimally ttp at LUQ, well healed suirgical incision sites   MUSCULOSKELETAL: Muscle strength intact in all extremities bilaterally, LLE without gross abnormality, no rash, no lesions  NEUROLOGIC: CN II- XII intact. Gross motor intact without focal weakness. SKIN: No cyanosis, rashes, or edema noted.       CBC with Differential: Lab Results   Component Value Date/Time    WBC 4.3 11/28/2022 04:26 PM    RBC 4.38 11/28/2022 04:26 PM    RBC 5.14 02/24/2012 11:00 AM    HGB 14.0 11/28/2022 04:26 PM    HCT 41.5 11/28/2022 04:26 PM     11/28/2022 04:26 PM     02/24/2012 11:00 AM    MCV 94.7 11/28/2022 04:26 PM    MCH 32.0 11/28/2022 04:26 PM    MCHC 33.7 11/28/2022 04:26 PM    RDW 13.8 11/28/2022 04:26 PM    LYMPHOPCT 30 11/28/2022 04:26 PM    MONOPCT 15 11/28/2022 04:26 PM    BASOPCT 1 11/28/2022 04:26 PM    MONOSABS 0.64 11/28/2022 04:26 PM    LYMPHSABS 1.29 11/28/2022 04:26 PM    EOSABS 0.07 11/28/2022 04:26 PM    BASOSABS <0.03 11/28/2022 04:26 PM    DIFFTYPE NOT REPORTED 02/16/2022 12:09 PM     BMP:    Lab Results   Component Value Date/Time     11/28/2022 04:17 PM    K 3.9 11/28/2022 04:17 PM     11/28/2022 04:17 PM    CO2 24 11/28/2022 04:17 PM    BUN 13 11/28/2022 04:17 PM    LABALBU 4.1 11/28/2022 04:17 PM    LABALBU 4.6 04/24/2012 12:55 PM    CREATININE 1.16 11/28/2022 04:17 PM    CALCIUM 8.7 11/28/2022 04:17 PM    GFRAA 59 07/29/2022 10:30 AM    LABGLOM >60 11/28/2022 04:17 PM    GLUCOSE 100 11/28/2022 04:17 PM    GLUCOSE 97 04/24/2012 12:55 PM     Hepatic Function Panel:    Lab Results   Component Value Date/Time    ALKPHOS 113 11/28/2022 04:17 PM    ALT 57 11/28/2022 04:17 PM    AST 48 11/28/2022 04:17 PM    PROT 7.2 11/28/2022 04:17 PM    BILITOT 0.5 11/28/2022 04:17 PM    BILIDIR 2.3 10/26/2022 06:37 PM    IBILI 0.8 10/26/2022 06:37 PM    LABALBU 4.1 11/28/2022 04:17 PM    LABALBU 4.6 04/24/2012 12:55 PM       Pertinent Radiology:   XR LUMBAR SPINE (2-3 VIEWS)    Result Date: 11/29/2022  EXAMINATION: THREE XRAY VIEWS OF THE LUMBAR SPINE 11/29/2022 11:53 am COMPARISON: 07/15/2019 HISTORY: ORDERING SYSTEM PROVIDED HISTORY: radicular pain TECHNOLOGIST PROVIDED HISTORY: radicular pain FINDINGS: The vertebral body heights are preserved.   There is mild anterolisthesis at L4-L5, measuring 0.6 cm, which is unchanged. There is mild retrolisthesis at L1-L2 and L2-L3, measuring 0.4-0.5 cm, which is unchanged. There is severe multilevel disc space narrowing, endplate spurring, and facet arthropathy. 1.  No evidence of acute compression fracture or malalignment in the lumbar spine. 2.  Severe multilevel degenerative changes. XR CHEST PORTABLE    Result Date: 11/28/2022  EXAMINATION: ONE XRAY VIEW OF THE CHEST 11/28/2022 3:52 pm COMPARISON: 10/25/2022 HISTORY: ORDERING SYSTEM PROVIDED HISTORY: Altered Mental Status TECHNOLOGIST PROVIDED HISTORY: Altered Mental Status FINDINGS: Cardiomediastinal silhouette is normal in size. There is no pleural effusion or pneumothorax. There is no pulmonary consolidation. There is no acute osseous abnormality. No acute cardiopulmonary abnormality. ASSESSMENT:  79year old male s/p laparoscopic cholecystectomy with nausea and vomiting    Plan:  Continue medical management and supportive care per primary  OK for diet as tolerated from surgical standpoint  CT scan reviewed - suspect normal postoperative changes after cholecystectomy  No acute intervention at this time  General Surgery to sign off    Discussed with Dr Madalyn Colbert.     Electronically signed by Anthon Burkitt, MD  on 11/29/2022 at 3:20 PM

## 2022-11-29 NOTE — ED PROVIDER NOTES
[address] [facility]  Emergency Department  Emergency Medicine Resident Sign-out     Care of Sonam Lockhart was assumed from Dr. Hosea Sidhu and is being seen for Fatigue  . The patient's initial evaluation and plan have been discussed with the prior provider who initially evaluated the patient. EMERGENCY DEPARTMENT COURSE / MEDICAL DECISION MAKING:       MEDICATIONS GIVEN:  Orders Placed This Encounter   Medications    magnesium sulfate 2000 mg in 50 mL IVPB premix    0.9 % sodium chloride bolus    sodium chloride flush 0.9 % injection 5-40 mL    sodium chloride flush 0.9 % injection 5-40 mL    0.9 % sodium chloride infusion    enoxaparin (LOVENOX) injection 40 mg     Order Specific Question:   Indication of Use     Answer:   Prophylaxis-DVT/PE    acetaminophen (TYLENOL) tablet 650 mg    OR Linked Order Group     ondansetron (ZOFRAN-ODT) disintegrating tablet 4 mg     ondansetron (ZOFRAN) injection 4 mg       LABS / RADIOLOGY:     Labs Reviewed   CBC WITH AUTO DIFFERENTIAL - Abnormal; Notable for the following components:       Result Value    Monocytes 15 (*)     Immature Granulocytes 1 (*)     All other components within normal limits   COMPREHENSIVE METABOLIC PANEL - Abnormal; Notable for the following components:    Glucose 100 (*)     ALT 57 (*)     AST 48 (*)     All other components within normal limits   CULTURE, BLOOD 1   CULTURE, BLOOD 1   CULTURE, URINE   COVID-19, RAPID   LACTIC ACID   TSH WITH REFLEX   TROPONIN   URINALYSIS WITH MICROSCOPIC   LIPASE   LACTIC ACID   POCT GLUCOSE       XR CHEST PORTABLE    Result Date: 11/28/2022  EXAMINATION: ONE XRAY VIEW OF THE CHEST 11/28/2022 3:52 pm COMPARISON: 10/25/2022 HISTORY: ORDERING SYSTEM PROVIDED HISTORY: Altered Mental Status TECHNOLOGIST PROVIDED HISTORY: Altered Mental Status FINDINGS: Cardiomediastinal silhouette is normal in size. There is no pleural effusion or pneumothorax. There is no pulmonary consolidation.   There is no acute osseous abnormality. No acute cardiopulmonary abnormality. RECENT VITALS:     Temp: 97.1 °F (36.2 °C),  Heart Rate: 59, Resp: 18, BP: (!) 119/95, SpO2: 95 %      This patient is a 79 y.o. Male with history of Parkinson disease, CKD, NSTEMI, recent cholecystectomy, now with nausea, vomiting for the past 5 days and lightheadedness and fatigue now. Overall work-up has been unremarkable to this point. With patient's significant risk factors and history of cardiac disease, will plan to admit Obs for cadiac w/u. ED Course as of 11/28/22 1927 Mon Nov 28, 2022   1711 CBC unremarkable. TSH within normal limits. Lactate within normal limits. [DH]   0803 Chest x-ray appears unremarkable. [DH]   1730 Comprehensive Metabolic Panel(!):    Glucose, Random 100(!)   BUN,BUNPL 13   Creatinine 1.16   Est, Glom Filt Rate >60   CALCIUM, SERUM, 787372 8.7   Sodium 137   Potassium 3.9   Chloride 101   CO2 24   Anion Gap 12   Alk Phos 113   ALT 57(!)   AST 48(!)   Bilirubin 0.5   Total Protein 7.2   Albumin 4.1   ALBUMIN/GLOBULIN RATIO 1.3 [DH]   1731 Troponin:    Troponin, High Sensitivity 22 [DH]      ED Course User Index  [DH] Pelon Weldon MD       OUTSTANDING TASKS / RECOMMENDATIONS:    Awaiting bed placement      FINAL IMPRESSION:     1. Generalized weakness        DISPOSITION:         DISPOSITION:  []  Discharge   []  Transfer -    [x]  Admission -  Obs   []  Against Medical Advice   []  Eloped   FOLLOW-UP: No follow-up provider specified.    DISCHARGE MEDICATIONS: New Prescriptions    No medications on file          Juan Antonio Burnett MD  Emergency Medicine Resident  [facility]      Juan Antonio Burnett MD  Resident  11/28/22 2771

## 2022-11-29 NOTE — CARE COORDINATION
Case Management Assessment  Initial Evaluation    Date/Time of Evaluation: 11/28/2022 7:15 PM  Assessment Completed by: Errol English RN    If patient is discharged prior to next notation, then this note serves as note for discharge by case management. Patient Name: Rosa Mckeon                   YOB: 1940  Diagnosis: Nausea & vomiting [R11.2]                   Date / Time: 11/28/2022  2:33 PM    Patient Admission Status: Observation   Readmission Risk (Low < 19, Mod (19-27), High > 27): Readmission Risk Score: 13.9    Current PCP: JW Yusuf CNP  PCP verified by CM? Yes    Chart Reviewed: Yes      History Provided by: Patient  Patient Orientation: Alert and Oriented    Patient Cognition: Alert    Hospitalization in the last 30 days (Readmission):  Yes    If yes, Readmission Assessment in  Navigator will be completed. Advance Directives:      Code Status: Full Code   Patient's Primary Decision Maker is:      Primary Decision MakerPatrick Chick - Child - [phone]    Secondary Decision Maker: Peter Lowe  [phone]    Discharge Planning:    Patient lives with: Alone Type of Home: House  Primary Care Giver: Self  Patient Support Systems include: Family Members, Friends/Neighbors   Current Financial resources:    Current community resources:    Current services prior to admission: Durable Medical Equipment            Current DME: Cane            Type of Home Care services:  None    ADLS  Prior functional level: Independent in ADLs/IADLs  Current functional level: Independent in ADLs/IADLs    PT AM-PAC:   /24  OT AM-PAC:   /24    Family can provide assistance at DC: Would you like Case Management to discuss the discharge plan with any other family members/significant others, and if so, who?     Plans to Return to Present Housing: Yes  Other Identified Issues/Barriers to RETURNING to current housing: none  Potential Assistance needed at discharge: N/A Potential DME:    Patient expects to discharge to: [facility] for transportation at discharge:      Financial    Payor: Kimo Ferguson / Plan: Kimo Dec / Product Type: *No Product type* /     Does insurance require precert for SNF: Yes    Potential assistance Purchasing Medications: No  Meds-to-Beds request:        [address], [place] - [address] [facility] [address] [address]  Phone: [phone] Fax: [phone]    [address]  [address] [place] [address]  Phone: [phone] Fax: [phone]    [address], [facility] [address]. - P [phone] - F [phone]  [address] [place], [place] [address] [address]  Phone: [phone] Fax: [phone]      Notes:    Factors facilitating achievement of predicted outcomes:     Barriers to discharge: Additional Case Management Notes: home alone    The Plan for Transition of Care is related to the following treatment goals of Nausea & vomiting [H11.5]    IF APPLICABLE: The Patient and/or patient representative Abebe Beatty and his family were provided with a choice of provider and agrees with the discharge plan. Freedom of choice list with basic dialogue that supports the patient's individualized plan of care/goals and shares the quality data associated with the providers was provided to:     Patient Representative Name:       The Patient and/or Patient Representative Agree with the Discharge Plan?       Debi Venegas RN  Case Management Department  Ph: [phone] Fax:

## 2022-11-30 LAB
BILIRUBIN URINE: NEGATIVE
COLOR: YELLOW
EPITHELIAL CELLS UA: NORMAL /HPF (ref 0–5)
GLUCOSE URINE: NEGATIVE
KETONES, URINE: NEGATIVE
LEUKOCYTE ESTERASE, URINE: NEGATIVE
NITRITE, URINE: NEGATIVE
PH UA: 6.5 (ref 5–8)
PROTEIN UA: NEGATIVE
RBC UA: NORMAL /HPF (ref 0–4)
SPECIFIC GRAVITY UA: 1.01 (ref 1–1.03)
TURBIDITY: CLEAR
URINE HGB: NEGATIVE
UROBILINOGEN, URINE: NORMAL
WBC UA: NORMAL /HPF (ref 0–5)

## 2022-11-30 PROCEDURE — 2580000003 HC RX 258

## 2022-11-30 PROCEDURE — 97166 OT EVAL MOD COMPLEX 45 MIN: CPT

## 2022-11-30 PROCEDURE — 87086 URINE CULTURE/COLONY COUNT: CPT

## 2022-11-30 PROCEDURE — 6370000000 HC RX 637 (ALT 250 FOR IP)

## 2022-11-30 PROCEDURE — 81001 URINALYSIS AUTO W/SCOPE: CPT

## 2022-11-30 PROCEDURE — 97162 PT EVAL MOD COMPLEX 30 MIN: CPT

## 2022-11-30 PROCEDURE — 6360000002 HC RX W HCPCS: Performed by: NURSE PRACTITIONER

## 2022-11-30 PROCEDURE — 97530 THERAPEUTIC ACTIVITIES: CPT

## 2022-11-30 PROCEDURE — 97535 SELF CARE MNGMENT TRAINING: CPT

## 2022-11-30 PROCEDURE — G0378 HOSPITAL OBSERVATION PER HR: HCPCS

## 2022-11-30 PROCEDURE — 96375 TX/PRO/DX INJ NEW DRUG ADDON: CPT

## 2022-11-30 RX ORDER — METOCLOPRAMIDE HYDROCHLORIDE 5 MG/ML
10 INJECTION INTRAMUSCULAR; INTRAVENOUS ONCE
Status: COMPLETED | OUTPATIENT
Start: 2022-11-30 | End: 2022-11-30

## 2022-11-30 RX ORDER — PROMETHAZINE HYDROCHLORIDE 25 MG/1
25 TABLET ORAL 3 TIMES DAILY PRN
Qty: 12 TABLET | Refills: 0 | Status: SHIPPED | OUTPATIENT
Start: 2022-11-30 | End: 2022-12-07

## 2022-11-30 RX ORDER — ONDANSETRON 4 MG/1
4 TABLET, FILM COATED ORAL EVERY 8 HOURS PRN
Qty: 20 TABLET | Refills: 0 | Status: SHIPPED | OUTPATIENT
Start: 2022-11-30

## 2022-11-30 RX ADMIN — AMITRIPTYLINE HYDROCHLORIDE 10 MG: 10 TABLET, FILM COATED ORAL at 19:35

## 2022-11-30 RX ADMIN — ENTACAPONE 100 MG: 200 TABLET, FILM COATED ORAL at 14:05

## 2022-11-30 RX ADMIN — GABAPENTIN 100 MG: 100 CAPSULE ORAL at 19:35

## 2022-11-30 RX ADMIN — ISOSORBIDE MONONITRATE 30 MG: 30 TABLET ORAL at 09:04

## 2022-11-30 RX ADMIN — CARBIDOPA AND LEVODOPA 1 TABLET: 25; 100 TABLET ORAL at 14:05

## 2022-11-30 RX ADMIN — GABAPENTIN 100 MG: 100 CAPSULE ORAL at 09:04

## 2022-11-30 RX ADMIN — ONDANSETRON 4 MG: 4 TABLET, ORALLY DISINTEGRATING ORAL at 10:17

## 2022-11-30 RX ADMIN — ACETAMINOPHEN 650 MG: 325 TABLET ORAL at 16:14

## 2022-11-30 RX ADMIN — CLOPIDOGREL 75 MG: 75 TABLET, FILM COATED ORAL at 09:04

## 2022-11-30 RX ADMIN — CARBIDOPA AND LEVODOPA 1 TABLET: 25; 100 TABLET ORAL at 18:03

## 2022-11-30 RX ADMIN — METOPROLOL TARTRATE 12.5 MG: 25 TABLET ORAL at 09:04

## 2022-11-30 RX ADMIN — ACETAMINOPHEN 650 MG: 325 TABLET ORAL at 09:03

## 2022-11-30 RX ADMIN — METOPROLOL TARTRATE 12.5 MG: 25 TABLET ORAL at 19:35

## 2022-11-30 RX ADMIN — METOCLOPRAMIDE 10 MG: 5 INJECTION, SOLUTION INTRAMUSCULAR; INTRAVENOUS at 16:15

## 2022-11-30 RX ADMIN — DESMOPRESSIN ACETATE 40 MG: 0.2 TABLET ORAL at 09:04

## 2022-11-30 RX ADMIN — ENTACAPONE 100 MG: 200 TABLET, FILM COATED ORAL at 19:35

## 2022-11-30 RX ADMIN — SODIUM CHLORIDE, PRESERVATIVE FREE 10 ML: 5 INJECTION INTRAVENOUS at 19:36

## 2022-11-30 RX ADMIN — CARBIDOPA AND LEVODOPA 1 TABLET: 25; 100 TABLET ORAL at 09:03

## 2022-11-30 RX ADMIN — GABAPENTIN 100 MG: 100 CAPSULE ORAL at 17:16

## 2022-11-30 RX ADMIN — ROPINIROLE HYDROCHLORIDE 0.25 MG: 0.25 TABLET, FILM COATED ORAL at 19:35

## 2022-11-30 RX ADMIN — SODIUM CHLORIDE, PRESERVATIVE FREE 10 ML: 5 INJECTION INTRAVENOUS at 09:04

## 2022-11-30 RX ADMIN — PANTOPRAZOLE SODIUM 40 MG: 40 TABLET, DELAYED RELEASE ORAL at 09:04

## 2022-11-30 RX ADMIN — ENTACAPONE 100 MG: 200 TABLET, FILM COATED ORAL at 18:03

## 2022-11-30 RX ADMIN — ROPINIROLE HYDROCHLORIDE 0.25 MG: 0.25 TABLET, FILM COATED ORAL at 17:16

## 2022-11-30 RX ADMIN — ROPINIROLE HYDROCHLORIDE 0.25 MG: 0.25 TABLET, FILM COATED ORAL at 09:04

## 2022-11-30 RX ADMIN — CARBIDOPA AND LEVODOPA 1 TABLET: 25; 100 TABLET ORAL at 19:35

## 2022-11-30 RX ADMIN — TAMSULOSIN HYDROCHLORIDE 0.4 MG: 0.4 CAPSULE ORAL at 09:04

## 2022-11-30 RX ADMIN — ENTACAPONE 100 MG: 200 TABLET, FILM COATED ORAL at 09:03

## 2022-11-30 NOTE — PROGRESS NOTES
Physical Therapy  Facility/Department: [address] OBSERVATION  Physical Therapy Initial Assessment    Name: Stone Landeros  : 1940  MRN: [id number]  Date of Service: 2022  Chief Complaint   Patient presents with    Fatigue      Discharge Recommendations:  Patient would benefit from continued therapy after discharge      Patient Diagnosis(es): The encounter diagnosis was Generalized weakness. 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 Past Surgical History:  has a past surgical history that includes Colonoscopy (2012); shoulder surgery (Right); pr colsc flx w/rmvl of tumor polyp lesion snare tq (N/A, 2017); Colonoscopy (2017); Cholecystectomy, laparoscopic (10/29/2022); and Cholecystectomy, laparoscopic (N/A, 10/29/2022). Assessment   Body Structures, Functions, Activity Limitations Requiring Skilled Therapeutic Intervention: Decreased functional mobility ; Decreased strength;Decreased endurance  Assessment: The pt ambulated 35 ft with a std cane x CGA. He moved slowly and had a c/o fatigue with mobilization.  He could benefit from a continuation of PT to assist in his return to his PLOF  Therapy Prognosis: Good  Decision Making: Medium Complexity  Requires PT Follow-Up: Yes  Activity Tolerance  Activity Tolerance: Patient limited by fatigue;Patient limited by endurance     Plan   Physcial Therapy Plan  General Plan:  (5-6x wk)  Current Treatment 62  Heart Rate Source: Monitor  BP: 115/86  MAP (Calculated): 96  Resp: 18  SpO2: 96 %  O2 Device: None (Room air)     AROM RLE (degrees)  RLE AROM: WNL  AROM LLE (degrees)  LLE AROM : WFL  AROM RUE (degrees)  RUE AROM : WNL  AROM LUE (degrees)  LUE AROM : WFL  Strength RLE  Strength RLE: WFL  Strength LLE  Strength LLE: WFL  Strength RUE  Strength RUE: WFL  Strength LUE  Strength LUE: WFL        Bed Mobility Training  Bed Mobility Training: Yes  Overall Level of Assistance: Additional time;Minimum assistance  Interventions: Verbal cues  Supine to Sit: Minimum assistance; Additional time  Sit to Supine: Stand-by assistance  Scooting: Stand-by assistance  Balance  Sitting: Without support (SBA EOB)  Standing: With support (CGA w/cane)  Transfer Training  Transfer Training: Yes  Overall Level of Assistance: Contact-guard assistance; Additional time; Adaptive equipment (cane)  Interventions: Verbal cues  Sit to Stand: Contact-guard assistance  Stand to Sit: Contact-guard assistance  Gait  Overall Level of Assistance: Contact-guard assistance; Adaptive equipment  Assistive Device: Cane, straight  Bed mobility  Supine to Sit: Minimal assistance  Sit to Supine: Minimal assistance  Scooting: Minimal assistance  Transfers  Sit to Stand: Minimal Assistance  Stand to Sit: Minimal Assistance  Ambulation  Surface: Level tile  Device: Single point cane  Assistance: Contact guard assistance  Distance: amb 35 ft with a std cane x CGA     Balance  Posture: Good  Sitting - Static: Fair  Sitting - Dynamic: Fair  Standing - Static: Fair  Standing - Dynamic: Fair     OutComes Score          AM-PAC Score  AM-PAC Inpatient Mobility Raw Score : 18 (11/30/22 1113)  AM-PAC Inpatient T-Scale Score : 43.63 (11/30/22 1113)  Mobility Inpatient CMS 0-100% Score: 46.58 (11/30/22 1113)  Mobility Inpatient CMS G-Code Modifier : CK (11/30/22 1113)     Tinneti Score     Goals  Short Term Goals  Time Frame for Short Term Goals: 10 visits  Short Term Goal 1: transfers with SBA  Short Term Goal 2: amb 150 ft with a std cane x SBA  Short Term Goal 3: ascend/descend 4 steps with SBA  Short Term Goal 4: to be independent with bed mobility  Patient Goals   Patient Goals : Return home     Education  Patient Education  Education Given To: Patient  Education Provided: Role of Therapy;Plan of Care  Education Method: Verbal  Barriers to Learning: None  Education Outcome: Verbalized understanding    Therapy Time   Individual Concurrent Group Co-treatment   Time In 0935         Time Out 1000         Minutes 25             1 of [address] [facility],

## 2022-11-30 NOTE — PROGRESS NOTES
OBS/CDU   RESIDENT NOTE      Patients PCP is:  JW Ferrer CNP        SUBJECTIVE      No acute events overnight. Has been able to tolerate a full diet without vomiting. Patient reports some nausea after eating, however he is tolerating meals without emesis. He reports improvement with antiemetics. The patient is urinating on his own and is passing flatus. Denies fever, chills, nausea, vomiting, chest pain, shortness of breath, abdominal pain, focal weakness, numbness, tingling, urinary/bowel symptoms, vision changes, visual hallucinations, or headache. PHYSICAL EXAM      General: NAD, AO X 3  Heent: EMOI, PERRL  Neck: SUPPLE, NO JVD  Cardiovascular: RRR, S1S2  Pulmonary: CTAB, NO SOB  Abdomen: SOFT, NTTP, ND, +BS  Extremities: +2/4 PULSES DISTAL, NO SWELLING  Neuro / Psych: NO NUMBNESS OR TINGLING, MENTATION AT BASELINE    PERTINENT TEST /EXAMS      I have reviewed all available laboratory results.     MEDICATIONS CURRENT   metoclopramide (REGLAN) injection 10 mg, Once  sodium chloride flush 0.9 % injection 5-40 mL, 2 times per day  sodium chloride flush 0.9 % injection 5-40 mL, PRN  0.9 % sodium chloride infusion, PRN  enoxaparin (LOVENOX) injection 40 mg, Daily  acetaminophen (TYLENOL) tablet 650 mg, Q4H PRN  ondansetron (ZOFRAN-ODT) disintegrating tablet 4 mg, Q8H PRN   Or  ondansetron (ZOFRAN) injection 4 mg, Q6H PRN  amitriptyline (ELAVIL) tablet 10 mg, Nightly  atorvastatin (LIPITOR) tablet 40 mg, Daily  carbidopa-levodopa (SINEMET)  MG per tablet 1 tablet, 4x Daily  entacapone (COMTAN) tablet 100 mg, 4x Daily  gabapentin (NEURONTIN) capsule 100 mg, TID  isosorbide mononitrate (IMDUR) extended release tablet 30 mg, Daily  metoprolol tartrate (LOPRESSOR) tablet 12.5 mg, BID  pantoprazole (PROTONIX) tablet 40 mg, QAM AC  rOPINIRole (REQUIP) tablet 0.25 mg, TID  tamsulosin (FLOMAX) capsule 0.4 mg, Daily  clopidogrel (PLAVIX) tablet 75 mg, Daily        All medication charted and reviewed. CONSULTS      IP CONSULT TO CARDIOLOGY  IP CONSULT TO GENERAL SURGERY    ASSESSMENT/PLAN       Jameel Alexis is a 79 y.o. male who presents with weakness, fatigue, nausea, and vomiting    Nausea, vomiting, fatigue  Patient is approximately 1 month postop from [facility] with Dr. Christopher Donaldson. General surgery was consulted for assessment. Per general surgery, no acute intervention required at this time. Continue symptomatic management. On assessment, patient continues to tolerated diet appropriately. Weakness  Patient seen and evaluated by both physical therapy and Occupational Therapy who note the patient would benefit from outpatient therapy. Order placed for home care with PT and OT  Case management consulted to assess the patient for placement with a home care organization. Pending patient's tolerance of diet, will likely discharge home today with antinausea medications. Continue home medications and pain control  Monitor vitals, labs, and imaging  DISPO: pending consults and clinical improvement    --  Kash Levine, DO  Emergency Medicine Resident Physician     This dictation was generated by voice recognition computer software. Although all attempts are made to edit the dictation for accuracy, there may be errors in the transcription that are not intended.

## 2022-11-30 NOTE — CARE COORDINATION
Spoke with the pt for transitional discharge planning, pt states he does not need home care and has a friend Gume Meza that will pick him up. Pt calls Gume Meza on speaker phone, writer asks Tracymaximus Susana if pt is safe at home and if he needs anything such as home care, Gume Tracymiller states \"we take care of him and will pick pt up in 1 hour\"  4:10 Nurse Jess Steiner informs CM that discharge is being cancelled and pt is staying due to pt feels weak. CM spoke with NP Deisy Krysta was cancelled due to pt \"not feeling any better than he did when he came in\" Writer spoke with pt, now states he wants to go back to [facility] because he is weak and needs p.t.  Referral is made

## 2022-11-30 NOTE — PROGRESS NOTES
[address]  CDU / OBSERVATION ENCOUNTER  ATTENDING NOTE       I performed a history and physical examination of the patient and discussed management with the resident or midlevel provider. I reviewed the resident or midlevel provider's note and agree with the documented findings and plan of care. Any areas of disagreement are noted on the chart. I was personally present for the key portions of any procedures. I have documented in the chart those procedures where I was not present during the key portions. I have reviewed the nurses notes. I agree with the chief complaint, past medical history, past surgical history, allergies, medications, social and family history as documented unless otherwise noted below. The Family history, social history, and ROS are effectively unchanged since admission unless noted elsewhere in the chart. Patient with persistent nausea and vomiting after cholecystectomy. Patient appears to have done well after cholecystectomy though states he has had some nausea and vomiting since. Patient has well-healed incisions. Patient has had 3 weeks since the surgery but he does have persistent symptoms. Patient has not followed up with surgery. Due to lack of surgical follow-up we will asked surgery to see patient. Doubt surgical emergency but patient with GI symptoms after gallbladder surgery. Appreciate surgical evaluation. Patient elderly and occult surgical illness is difficult to rule out in this age group. Will observe patient along with surgical team for resolution of symptoms.     Unknown Diogenes CALLAHAN  Attending Emergency  Physician

## 2022-11-30 NOTE — PROGRESS NOTES
Occupational Therapy  Facility/Department: [address] OBSERVATION  Occupational Therapy Initial Assessment    Name: Lemuel Richards  : 1940  MRN: [id number]  Date of Service: 2022    Discharge Recommendations:  Patient would benefit from continued therapy after discharge  OT Equipment Recommendations  Equipment Needed: No       Patient Diagnosis(es): The encounter diagnosis was Generalized weakness. 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 Past Surgical History:  has a past surgical history that includes Colonoscopy (2012); shoulder surgery (Right); pr colsc flx w/rmvl of tumor polyp lesion snare tq (N/A, 2017); Colonoscopy (2017); Cholecystectomy, laparoscopic (10/29/2022); and Cholecystectomy, laparoscopic (N/A, 10/29/2022). Assessment   Performance deficits / Impairments: Decreased functional mobility ; Decreased ADL status; Decreased ROM; Decreased strength;Decreased balance;Decreased high-level IADLs;Decreased posture;Decreased coordination  Assessment: Pt limited in IND at this time d/t identified performance deficits. Pt requires CGA at this time for functional transfers/mobility/LB ADL's w/cane for safety. Pt would benefit from continued therapy to increase IND/safety with ADL's.   Prognosis: Good  Decision Making: Medium Complexity  REQUIRES OT FOLLOW-UP: Yes  Activity Tolerance  Activity Tolerance: Patient Tolerated treatment well Plan   Occupational Therapy Plan  Times Per Week: 2-4x/week  Current Treatment Recommendations: Balance training, Functional mobility training, Endurance training, Pain management, Safety education & training, Patient/Caregiver education & training, Equipment evaluation, education, & procurement, Self-Care / ADL     Restrictions  Restrictions/Precautions  Restrictions/Precautions: Up as Tolerated  Required Braces or Orthoses?: No    Subjective   General  Patient assessed for rehabilitation services?: Yes  Family / Caregiver Present: No  General Comment  Comments: RN okayed for therapy. Pt agreeable to OT/PT rolando. Pt reports 7/10 pain in stomach. Able to progress with therapy     Social/Functional History  Social/Functional History  Lives With: Other (comment) (friend lives upstairs)  Type of Home: House  Home Layout: Performs ADL's on one level, Two level  Home Access: Stairs to enter with rails  Entrance Stairs - Number of Steps: 5  Entrance Stairs - Rails: Both  Bathroom Shower/Tub: Shower chair with back, Tub/Shower unit  Bathroom Toilet: Standard  Bathroom Equipment: Grab bars in shower, Shower chair  Home Equipment: ariadne Perez  Receives Help From: [employer], Friend(s)  ADL Assistance: Independent  Homemaking Assistance: Independent  Homemaking Responsibilities: Yes  Ambulation Assistance: Independent  Transfer Assistance: Independent  Active : Yes  Mode of Transportation: Car, Cab (sometimes takes cab)  Leisure & Hobbies: work around the house  Additional Comments: Pt reports neighbors check in and friend lives upstairs       Objective                Safety Devices  Type of Devices: Patient at risk for falls;Gait belt;Left in bed;Call light within reach  Restraints  Restraints Initially in Place: No  Bed Mobility Training  Bed Mobility Training: Yes  Overall Level of Assistance: Additional time;Minimum assistance  Interventions: Verbal cues  Supine to Sit: Minimum assistance; Additional time  Sit to Supine: Stand-by assistance  Scooting: Stand-by assistance  Balance  Sitting: Without support (SBA EOB)  Standing: With support (CGA w/cane)  Transfer Training  Transfer Training: Yes  Overall Level of Assistance: Contact-guard assistance; Additional time; Adaptive equipment (cane)  Interventions: Verbal cues  Sit to Stand: Contact-guard assistance  Stand to Sit: Contact-guard assistance  Gait  Overall Level of Assistance: Contact-guard assistance; Adaptive equipment  Assistive Device: Cane, straight     AROM: Generally decreased, functional (~90 degress R shoulder flexion, ~70 degrees L shoulder flexion; BUE WFL otherwise)  PROM: Generally decreased, functional (~70 degrees PROM R shoulder; hard end feel)  Strength: Generally decreased, functional (3/5 B shoulders; 3+/5 BUE grossly otherwise)  Coordination: Generally decreased, functional (hx parkinsons; tremors)  Tone: Normal  Sensation: Impaired (\"sometimes\" numb/ting in B hands/feet)  ADL  Feeding: Independent  Grooming: Stand by assistance  UE Bathing: Stand by assistance  LE Bathing: Contact guard assistance; Increased time to complete  UE Dressing: Stand by assistance  LE Dressing: Contact guard assistance; Increased time to complete  Toileting: Contact guard assistance; Increased time to complete  Additional Comments: Pt supine in bed at start of session. Min A sup<>sit for B leg progression. CGA functional mobility/transfers w/cane. Shuffled gait however no LOB. Pt able to don/doff socks EOB with increased time. Tremors noted in BUE at times with decreased ROM in L shoulder.               Vision  Vision: Impaired  Vision Exceptions: Wears glasses at all times  Hearing  Hearing: Within functional limits  Cognition  Overall Cognitive Status: WFL  Orientation  Overall Orientation Status: Within Functional Limits                  Education Given To: Patient  Education Provided: Role of Therapy;Plan of Care;ADL Adaptive Strategies  Education Method: Verbal  Barriers to Learning: None  Education Outcome: Continued education needed;Verbalized understanding                                                                AM-PAC Score        AM-PAC Inpatient Daily Activity Raw Score: 19 (11/30/22 1108)  AM-PAC Inpatient ADL T-Scale Score : 40.22 (11/30/22 1108)  ADL Inpatient CMS 0-100% Score: 42.8 (11/30/22 1108)  ADL Inpatient CMS G-Code Modifier : CK (11/30/22 1108)         Goals  Short Term Goals  Time Frame for Short Term Goals: By discharge  Short Term Goal 1: Pt will complete LB ADL's SBA with AE/DME/modified techniques as needed  Short Term Goal 2: Pt will complete UB ADL's Mod I with AE/DME/modified techniques as needed  Short Term Goal 3: Pt will complete functional mobility/transfers SBA with cane  Short Term Goal 4: Pt will demo Good safety throghout session without cuing  Short Term Goal 5: Pt will complete functional task while standing 5+ mins SBA with 1-2 UE support       Therapy Time   Individual Concurrent Group Co-treatment   Time In 0947         Time Out 1010         Minutes 23      Co-eval w/PT   Timed Code Treatment Minutes: 8 Minutes       Shad Alves OTR/L

## 2022-11-30 NOTE — PROGRESS NOTES
[address] [employer]  CDU / OBSERVATION ENCOUNTER  ATTENDING NOTE       I performed a history and physical examination of the patient and discussed management with the resident. I reviewed the residents note and agree with the documented findings and plan of care. Any areas of disagreement are noted on the chart. I was personally present for the key portions of any procedures. I have documented in the chart those procedures where I was not present during the key portions. I have reviewed the nurses notes. I agree with the chief complaint, past medical history, past surgical history, allergies, medications, social and family history as documented unless otherwise noted below. The Family history, social history, and ROS are effectively unchanged since admission unless noted elsewhere in the chart. Patient is a 48-year-old male who presents for evaluation of nausea and vomiting status postcholecystectomy 3 weeks ago. He was evaluated by general surgery and they reviewed his CT scan which showed postoperative findings without any acute process. General surgery has signed off. The patient was able to eat some breakfast this morning. He did not have any nausea medication prior to eating breakfast and was starting to become nauseous when I evaluated him. He was treated with Zofran with improvement. He was instructed to take nausea medicine before eating. I will prescribe Zofran ODT for home as well as Phenergan. Discharge planning for this afternoon.     Naima Baez DO  Attending Emergency Physician

## 2022-11-30 NOTE — DISCHARGE INSTR - COC
Continuity of Care Form    Patient Name: Juan Carlos Mcgraw   :  1940  MRN:  [id number]    Admit date:  2022  Discharge date:  22    Code Status Order: Full Code   Advance Directives:     Admitting Physician:  Bhavesh Conklin MD  PCP: JW Escalante CNP    Discharging Nurse:  Zohra Galaviz Unit/Room#: 5432/1504-83  Discharging Unit Phone Number:     Emergency Contact:   Extended Emergency Contact Information  Primary Emergency Contact: Jhony Gill  Address: n/a   [address] Phone: [phone]  Relation: Child  Secondary Emergency Contact: Dave Mccarthy  Address: [place] am[place] [address] Phone: [phone]  Mobile Phone: [phone]  Relation: Other    Past Surgical History:  Past Surgical History:   Procedure Laterality Date    CHOLECYSTECTOMY, LAPAROSCOPIC  10/29/2022    LAPROSCOPIC CHOLECYSTECTOMY, POSSIBLE OPEN    CHOLECYSTECTOMY, LAPAROSCOPIC N/A 10/29/2022    LAPROSCOPIC CHOLECYSTECTOMY performed by Compa Dickerson DO at [address] [place] [address]  2012    poor prep, tubular adenoma    COLONOSCOPY  2017    diverticulosis, multiple polyps as described, spot marking done, pathology-tubular adenoma x 4    [place] COLSC FLX W/RMVL OF TUMOR POLYP LESION SNARE TQ N/A 2017    COLONOSCOPY POLYPECTOMY SNARE/COLD BIOPSY with tatooing and apc transverse colon performed by Ap Neff MD at [address] [place] [address] Right     rotator cuff       Immunization History:   Immunization History   Administered Date(s) Administered    COVID-19, PFIZER Bivalent BOOSTER, (age 12y+), IM, 30 mcg/0.3 mL dose 2022    COVID-19, PFIZER GRAY top, DO NOT Dilute, (age 15 y+), IM, 30 mcg/0.3 mL 2022    COVID-19, PFIZER PURPLE top, DILUTE for use, (age 15 y+), 30mcg/0.3mL 2021, 2021, 2021    Influenza 2012    Influenza, FLUAD, (age 72 y+), Adjuvanted, 0.5mL 10/19/2020    Influenza, High Dose (Fluzone 65 yrs and older) 09/22/2014, 12/05/2016, 08/07/2017, 12/19/2017    Influenza, Triv, inactivated, subunit, adjuvanted, IM (Fluad 65 yrs and older) 08/23/2018, 10/06/2019    Pneumococcal Conjugate 13-valent (Schuyler Ranks) 06/14/2017, 05/18/2018, 05/18/2018    Pneumococcal Polysaccharide ([id number]) 09/25/2012, 12/04/2015       Active Problems:  Patient Active Problem List   Diagnosis Code    Temporary loss of eyesight H53.129    Syncope : posterior circulation stroke vs Neurocardiogenic syncope  R55    Intracranial bleed : traumatic left sub-dural hematoma ,managed conservatively in 2011 I62.9    Headache R51.9    CKD (chronic kidney disease) stage 3, GFR 30-59 ml/min ([facility]) N18.30    Depression F32. A    Hyperlipidemia E78.5    Microhematuria R31.29    Microalbuminuria R80.9    Essential hypertension I10    Generalized abdominal pain R10.84    Tubular adenoma of colon D12.6    Diverticulosis of colon K57.30    History of skull fracture Z87.81    Nausea R11.0    Pure hypercholesterolemia E78.00    Prediabetes R73.03    Parkinson disease ([facility] G20    Chronic post-traumatic headache, not intractable G44.329    Fall (on) (from) other stairs and steps, initial encounter W10. 8XXA    Strain of iliopsoas muscle, initial encounter S76.919A    Chronic pain of left knee M25.562, G89.29    Closed fracture of second toe of right foot S92.501A    Closed fracture of second toe of left foot S92.502A    Abnormal ECG R94.31    Cerebrovascular accident [facility]) I63.9    Chest pain, unspecified R07.9    Hematoma of scalp S00. 03XA    Left ventricular hypertrophy I51.7    Mild aortic valve regurgitation I35.1    Pulmonary hypertension, mild ([facility]) I27.20    Lumbar radiculopathy M54.16    Dizziness R42    Hyponatremia E87.1    Vomiting R11.10    General weakness R53.1    Overweight (BMI 25.0-29. 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    Lumbar facet arthropathy M47.816    Spinal stenosis of lumbar region without neurogenic claudication M48.061    Generalized weakness R53.1    Lumbosacral spondylosis without myelopathy M47.817    Gallstone pancreatitis K85.10    Calculus of gallbladder with acute cholecystitis without obstruction K80.00    Bandemia D72.825    Pulmonary nodule R91.1    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    Acute cholecystitis K81.0    TAMARA (acute kidney injury) ([facility] N17.9    History of non-ST elevation myocardial infarction (NSTEMI) 6/2022 I25.2    Postoperative retention of urine N99.89, R33.8    History of coronary artery stent placement Z95.5    Nausea & vomiting R11.2       Isolation/Infection:   Isolation            Contact          Patient Infection Status       Infection Onset Added Last Indicated Last Indicated By Review Planned Expiration Resolved Resolved By    MRSA  09/25/15 09/25/15 Kim Tyson RN        Leg 9/23/15    Resolved    COVID-19 (Rule Out) 10/25/22 10/25/22 10/25/22 COVID-19, Rapid (Ordered)   10/25/22 Rule-Out Test Resulted    COVID-19 (Rule Out) 09/01/21 09/01/21 09/01/21 COVID-19, Rapid (Ordered)   09/01/21 Rule-Out Test Resulted            Nurse Assessment:  Last Vital Signs: /86   Pulse 62   Temp 97.5 °F (36.4 °C) (Oral)   Resp 18   Ht 5' 7\" (1.702 m)   Wt 176 lb (79.8 kg)   SpO2 96%   BMI 27.57 kg/m²     Last documented pain score (0-10 scale): Pain Level: 4  Last Weight:   Wt Readings from Last 1 Encounters:   11/28/22 176 lb (79.8 kg)     Mental Status:  oriented    IV Access:  - None    Nursing Mobility/ADLs:  Walking   Assisted  Transfer Assisted  Bathing  Assisted  Dressing  Assisted  Toileting  Assisted  Feeding  Independent  Med Admin  Assisted  Med Delivery   whole    Wound Care Documentation and Therapy:  Incision 10/29/22 Abdomen Medial;Upper (Active)   Dressing Status Clean;Dry; Intact 11/01/22 0800   Incision Cleansed Not Cleansed 10/29/22 1600   Dressing/Treatment Open to air;Steri-strips 11/01/22 0800   Closure Steri-Strips 11/01/22 0800   Margins Approximated 11/01/22 0800   Incision Assessment Dry 11/01/22 0800   Drainage Amount None 11/01/22 0800   Karena-incision Assessment Intact 11/01/22 0800   Number of days: 32        Elimination:  Continence: Bowel: Yes  Bladder: Yes  Urinary Catheter: None   Colostomy/Ileostomy/Ileal Conduit: No       Date of Last BM: 12-6-22  No intake or output data in the 24 hours ending 11/30/22 1215  No intake/output data recorded. Safety Concerns:      At Risk for Falls    Impairments/Disabilities:      None    Nutrition Therapy:  Current Nutrition Therapy:   - Oral Diet:  General    Routes of Feeding: Oral  Liquids: No Restrictions  Daily Fluid Restriction: no  Last Modified Barium Swallow with Video (Video Swallowing Test): not done    Treatments at the Time of Hospital Discharge:   Respiratory Treatments: none  Oxygen Therapy:  {Therapy; copd oxygen:23204}  Ventilator:    - No ventilator support    Rehab Therapies: Physical Therapy and occupational therapy  Weight Bearing Status/Restrictions: No weight bearing restrictions  Other Medical Equipment (for information only, NOT a DME order):  ***  Other Treatments: ***    Patient's personal belongings (please select all that are sent with patient):  Glasses    RN SIGNATURE:  Electronically signed by Edgardo Gore RN on 12/7/22 at 2:30 PM EST    CASE MANAGEMENT/SOCIAL WORK SECTION    Inpatient Status Date: ***    Readmission Risk Assessment Score:  Readmission Risk              Risk of Unplanned Readmission:  0           Discharging to Facility/ Agency Name: [facility][1]  Address:  Phone: [phone]  Fax:      / signature: Electronically signed by Herminia Post RN on 12/7/22 at 11:58 AM EST    PHYSICIAN SECTION    Prognosis: {Prognosis:4593202527}    Condition at Discharge: Bev Galaviz Patient Condition:[id number]}    Rehab Potential (if transferring to Rehab): {Prognosis:5776469380}    Recommended Labs or Other Treatments After Discharge: ***    Physician Certification: I certify the above information and transfer of Amber Villatoro  is necessary for the continuing treatment of the diagnosis listed and that he requires [place] for less 30 days.      Update Admission H&P: {CHP DME Changes in QJDZL:149036678}    PHYSICIAN SIGNATURE:  Electronically signed by Jocelyne Madsen DO on 11/30/22 at 12:15 PM EST

## 2022-12-01 LAB
CULTURE: NORMAL
GLUCOSE BLD-MCNC: 96 MG/DL (ref 75–110)
SPECIMEN DESCRIPTION: NORMAL

## 2022-12-01 PROCEDURE — 97530 THERAPEUTIC ACTIVITIES: CPT

## 2022-12-01 PROCEDURE — G0378 HOSPITAL OBSERVATION PER HR: HCPCS

## 2022-12-01 PROCEDURE — 96372 THER/PROPH/DIAG INJ SC/IM: CPT

## 2022-12-01 PROCEDURE — 82947 ASSAY GLUCOSE BLOOD QUANT: CPT

## 2022-12-01 PROCEDURE — 6360000002 HC RX W HCPCS

## 2022-12-01 PROCEDURE — 2580000003 HC RX 258

## 2022-12-01 PROCEDURE — 97110 THERAPEUTIC EXERCISES: CPT

## 2022-12-01 PROCEDURE — 6370000000 HC RX 637 (ALT 250 FOR IP)

## 2022-12-01 RX ADMIN — GABAPENTIN 100 MG: 100 CAPSULE ORAL at 21:15

## 2022-12-01 RX ADMIN — ACETAMINOPHEN 650 MG: 325 TABLET ORAL at 04:03

## 2022-12-01 RX ADMIN — ENTACAPONE 100 MG: 200 TABLET, FILM COATED ORAL at 08:44

## 2022-12-01 RX ADMIN — METOPROLOL TARTRATE 12.5 MG: 25 TABLET ORAL at 08:37

## 2022-12-01 RX ADMIN — GABAPENTIN 100 MG: 100 CAPSULE ORAL at 08:37

## 2022-12-01 RX ADMIN — GABAPENTIN 100 MG: 100 CAPSULE ORAL at 13:28

## 2022-12-01 RX ADMIN — ROPINIROLE HYDROCHLORIDE 0.25 MG: 0.25 TABLET, FILM COATED ORAL at 21:14

## 2022-12-01 RX ADMIN — CARBIDOPA AND LEVODOPA 1 TABLET: 25; 100 TABLET ORAL at 13:28

## 2022-12-01 RX ADMIN — CLOPIDOGREL 75 MG: 75 TABLET, FILM COATED ORAL at 08:37

## 2022-12-01 RX ADMIN — SODIUM CHLORIDE, PRESERVATIVE FREE 10 ML: 5 INJECTION INTRAVENOUS at 08:38

## 2022-12-01 RX ADMIN — METOPROLOL TARTRATE 12.5 MG: 25 TABLET ORAL at 21:14

## 2022-12-01 RX ADMIN — ENTACAPONE 100 MG: 200 TABLET, FILM COATED ORAL at 17:22

## 2022-12-01 RX ADMIN — CARBIDOPA AND LEVODOPA 1 TABLET: 25; 100 TABLET ORAL at 17:22

## 2022-12-01 RX ADMIN — ONDANSETRON 4 MG: 4 TABLET, ORALLY DISINTEGRATING ORAL at 17:22

## 2022-12-01 RX ADMIN — SODIUM CHLORIDE, PRESERVATIVE FREE 10 ML: 5 INJECTION INTRAVENOUS at 21:15

## 2022-12-01 RX ADMIN — PANTOPRAZOLE SODIUM 40 MG: 40 TABLET, DELAYED RELEASE ORAL at 08:37

## 2022-12-01 RX ADMIN — TAMSULOSIN HYDROCHLORIDE 0.4 MG: 0.4 CAPSULE ORAL at 08:37

## 2022-12-01 RX ADMIN — CARBIDOPA AND LEVODOPA 1 TABLET: 25; 100 TABLET ORAL at 08:37

## 2022-12-01 RX ADMIN — ENOXAPARIN SODIUM 40 MG: 100 INJECTION SUBCUTANEOUS at 08:38

## 2022-12-01 RX ADMIN — DESMOPRESSIN ACETATE 40 MG: 0.2 TABLET ORAL at 08:37

## 2022-12-01 RX ADMIN — AMITRIPTYLINE HYDROCHLORIDE 10 MG: 10 TABLET, FILM COATED ORAL at 21:15

## 2022-12-01 RX ADMIN — ONDANSETRON 4 MG: 4 TABLET, ORALLY DISINTEGRATING ORAL at 08:37

## 2022-12-01 RX ADMIN — ISOSORBIDE MONONITRATE 30 MG: 30 TABLET ORAL at 08:37

## 2022-12-01 RX ADMIN — ENTACAPONE 100 MG: 200 TABLET, FILM COATED ORAL at 13:27

## 2022-12-01 RX ADMIN — ENTACAPONE 100 MG: 200 TABLET, FILM COATED ORAL at 21:14

## 2022-12-01 RX ADMIN — CARBIDOPA AND LEVODOPA 1 TABLET: 25; 100 TABLET ORAL at 21:15

## 2022-12-01 RX ADMIN — ROPINIROLE HYDROCHLORIDE 0.25 MG: 0.25 TABLET, FILM COATED ORAL at 08:37

## 2022-12-01 RX ADMIN — ROPINIROLE HYDROCHLORIDE 0.25 MG: 0.25 TABLET, FILM COATED ORAL at 13:28

## 2022-12-01 ASSESSMENT — PAIN SCALES - GENERAL: PAINLEVEL_OUTOF10: 6

## 2022-12-01 NOTE — PROGRESS NOTES
OBS/CDU   RESIDENT NOTE      Patients PCP is:  JW Gómez - FELIPE        SUBJECTIVE      No acute events overnight. Has been able to tolerate a full diet without vomiting. Patient reports some nausea after eating, however he is tolerating meals without emesis. He reports improvement with antiemetics. The patient is urinating on his own and is passing flatus. Denies fever, chills, nausea, vomiting, chest pain, shortness of breath, abdominal pain, focal weakness, numbness, tingling, urinary/bowel symptoms, vision changes, visual hallucinations, or headache. PHYSICAL EXAM      General: NAD, AO X 3  Heent: EMOI, PERRL  Neck: SUPPLE, NO JVD  Cardiovascular: RRR, S1S2  Pulmonary: CTAB, NO SOB  Abdomen: SOFT, NTTP, ND, +BS  Extremities: +2/4 PULSES DISTAL, NO SWELLING  Neuro / Psych: NO NUMBNESS OR TINGLING, MENTATION AT BASELINE    PERTINENT TEST /EXAMS      I have reviewed all available laboratory results. MEDICATIONS CURRENT   sodium chloride flush 0.9 % injection 5-40 mL, 2 times per day  sodium chloride flush 0.9 % injection 5-40 mL, PRN  0.9 % sodium chloride infusion, PRN  enoxaparin (LOVENOX) injection 40 mg, Daily  acetaminophen (TYLENOL) tablet 650 mg, Q4H PRN  ondansetron (ZOFRAN-ODT) disintegrating tablet 4 mg, Q8H PRN   Or  ondansetron (ZOFRAN) injection 4 mg, Q6H PRN  amitriptyline (ELAVIL) tablet 10 mg, Nightly  atorvastatin (LIPITOR) tablet 40 mg, Daily  carbidopa-levodopa (SINEMET)  MG per tablet 1 tablet, 4x Daily  entacapone (COMTAN) tablet 100 mg, 4x Daily  gabapentin (NEURONTIN) capsule 100 mg, TID  isosorbide mononitrate (IMDUR) extended release tablet 30 mg, Daily  metoprolol tartrate (LOPRESSOR) tablet 12.5 mg, BID  pantoprazole (PROTONIX) tablet 40 mg, QAM AC  rOPINIRole (REQUIP) tablet 0.25 mg, TID  tamsulosin (FLOMAX) capsule 0.4 mg, Daily  clopidogrel (PLAVIX) tablet 75 mg, Daily      All medication charted and reviewed.     CONSULTS      IP CONSULT TO CARDIOLOGY  IP CONSULT TO GENERAL SURGERY  IP CONSULT TO HOME CARE NEEDS    ASSESSMENT/PLAN       Jermain Enriquez is a 79 y.o. male who presents with weakness, fatigue, nausea, and vomiting    Nausea, vomiting, fatigue  Patient is approximately 1 month postop from [facility] with Dr. Che Pope. General surgery was consulted for assessment. Per general surgery, no acute intervention required at this time. Continue symptomatic management. On assessment, patient continues to tolerated diet appropriately. Weakness  Patient seen and evaluated by both physical therapy and Occupational Therapy who note the patient would benefit from outpatient therapy. Order placed for home care with PT and OT  Case management consulted to assess the patient for placement with a home care organization. Pending patient's tolerance of diet, will likely discharge to SNF today with antinausea medications. Continue home medications and pain control  Monitor vitals, labs, and imaging  DISPO: pending consults and clinical improvement    --  Silvano Weller MD  Emergency Medicine Resident Physician     This dictation was generated by voice recognition computer software. Although all attempts are made to edit the dictation for accuracy, there may be errors in the transcription that are not intended.

## 2022-12-01 NOTE — PROGRESS NOTES
Physical Therapy  Facility/Department: [facility] 3C OBSERVATION  Daily treatment note    Name: Volodymyr Murray  : 1940  MRN: [id number]  Date of Service: 2022    Discharge Recommendations:  Patient would benefit from continued therapy after discharge   PT Equipment Recommendations  Equipment Needed: No ([facility] and )      Patient Diagnosis(es): The encounter diagnosis was Generalized weakness. 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 ([facility] [address] CKD (chronic kidney disease) stage 2, GFR 60-89 ml/min, CKD (chronic kidney disease) stage 3, GFR 30-59 ml/min ([facility]), Diverticulosis of colon, GERD (gastroesophageal reflux disease), History of non-ST elevation myocardial infarction (NSTEMI) 2022, Impaired renal function, MRSA (methicillin resistant staph aureus) culture positive, Osteoarthritis of knee, Parkinson disease ([facility] [address] Proteinuria, Skull fracture ([facility] [address] Temporary loss of eyesight, and Tubular adenoma of colon. Past Surgical History:  has a past surgical history that includes Colonoscopy (2012); shoulder surgery (Right); pr colsc flx w/rmvl of tumor polyp lesion snare tq (N/A, 2017); Colonoscopy (2017); Cholecystectomy, laparoscopic (10/29/2022); and Cholecystectomy, laparoscopic (N/A, 10/29/2022). Assessment   Body Structures, Functions, Activity Limitations Requiring Skilled Therapeutic Intervention: Decreased functional mobility ; Decreased body mechanics; Decreased endurance;Decreased high-level IADLs;Decreased balance;Decreased strength;Decreased ADL status; Decreased coordination  Assessment: Pt ambulates 20 ft with SPC and CGA. Pt is a fall risk d/t decreased balance and endurance, and is expected to require 24 hr support for functional mobility at d/c. Pt would benefit from continued therapy to promote endurance, balance, and strengthening.   Therapy Prognosis: Good  Decision Making: Medium Complexity  Barriers to Learning: none  Requires PT Follow-Up: Yes  Activity Tolerance  Activity Tolerance: Patient limited by fatigue;Patient limited by endurance     Plan   Physcial Therapy Plan  General Plan:  (5-6x wk)  Current Treatment Recommendations: Strengthening, Functional mobility training, Endurance training, Stair training, Gait training, Safety education & training, Balance training  Safety Devices  Type of Devices: Gait belt, Call light within reach, Nurse notified, All fall risk precautions in place, Left in chair  Restraints  Restraints Initially in Place: No     Restrictions  Restrictions/Precautions  Restrictions/Precautions: Up as Tolerated  Required Braces or Orthoses?: No     Subjective   General  Patient assessed for rehabilitation services?: Yes  Response To Previous Treatment: Patient with no complaints from previous session. Family / Caregiver Present: No  Follows Commands: Within Functional Limits  Subjective  Subjective: RN and pt agreeable to PT. pt agreeable and pleasant. Pt supine in bed at start of session, c/o no pain. Cognition   Orientation  Overall Orientation Status: Within Functional Limits  Cognition  Overall Cognitive Status: WFL                                  Bed mobility  Supine to Sit: Minimal assistance (For trunk progression)  Sit to Supine: Stand by assistance (pt retires to DOMNIGA Funes)  Scooting: Contact guard assistance  Bed Mobility Comments: HOB elevated  Transfers  Sit to Stand: Stand by assistance  Stand to Sit: Stand by assistance  Comment: SPC for transfer  Ambulation  Surface: Level tile  Device: Single point cane  Assistance: Contact guard assistance  Gait Deviations: Slow Ewa;Decreased step length;Decreased step height  Distance: 20 ft  Comments: No LOB noted, but pt is unsteady with ambulation, may benefit from RW usage.   More Ambulation?: No  Stairs/Curb  Stairs?: No     Balance  Posture: Good  Sitting - Static: Good  Sitting - Dynamic: Good;-  Standing - Static: Fair  Standing - Dynamic: Fair  Comments: Assessed with SPC                     Seated LE exercise program: Long Arc Quads, ankle pumps.  Reps: 40x AROM BLE                                     AM-PAC Score  AM-PAC Inpatient Mobility Raw Score : 18 (12/01/22 1437)  AM-PAC Inpatient T-Scale Score : 43.63 (12/01/22 1437)  Mobility Inpatient CMS 0-100% Score: 46.58 (12/01/22 1437)  Mobility Inpatient CMS G-Code Modifier : CK (12/01/22 1437)             Goals  Short Term Goals  Time Frame for Short Term Goals: 10 visits  Short Term Goal 1: transfers with SBA  Short Term Goal 2: amb 150 ft with a std cane x SBA  Short Term Goal 3: ascenbd/descend 4 steps with SBA  Short Term Goal 4: to be independent with bed mobility  Patient Goals   Patient Goals : Return home       Education  Patient Education  Education Given To: Patient  Education Provided: Role of Therapy;Plan of Care  Education Method: Verbal  Barriers to Learning: None  Education Outcome: Verbalized understanding      Therapy Time   Individual Concurrent Group Co-treatment   Time In 1140         Time Out 1206         Minutes 26         Timed Code Treatment Minutes: 24 Minutes       Jessika Soto, PT

## 2022-12-01 NOTE — CARE COORDINATION
Left VM for [facility] to follow up on referral, requested return call    Spoke with [facility] who explains that patient has a $2000 balance in order to return. Spoke with patient who relates he is having issues with credit cards, but was working on assisted living at [address] [place] [address]. Patient agreeable to referral to [facility] of [address] [place] [address]. Referral faxed.   Spoke with [facility] in admissions, they can accept and will start precert

## 2022-12-02 PROBLEM — R11.2 INTRACTABLE NAUSEA AND VOMITING: Status: ACTIVE | Noted: 2022-12-02

## 2022-12-02 LAB
EKG ATRIAL RATE: 71 BPM
EKG P AXIS: 63 DEGREES
EKG P-R INTERVAL: 178 MS
EKG Q-T INTERVAL: 430 MS
EKG QRS DURATION: 110 MS
EKG QTC CALCULATION (BAZETT): 467 MS
EKG R AXIS: 89 DEGREES
EKG T AXIS: -27 DEGREES
EKG VENTRICULAR RATE: 71 BPM

## 2022-12-02 PROCEDURE — 1200000000 HC SEMI PRIVATE

## 2022-12-02 PROCEDURE — 2580000003 HC RX 258

## 2022-12-02 PROCEDURE — 6370000000 HC RX 637 (ALT 250 FOR IP)

## 2022-12-02 PROCEDURE — 97535 SELF CARE MNGMENT TRAINING: CPT

## 2022-12-02 RX ADMIN — CLOPIDOGREL 75 MG: 75 TABLET, FILM COATED ORAL at 10:34

## 2022-12-02 RX ADMIN — GABAPENTIN 100 MG: 100 CAPSULE ORAL at 21:38

## 2022-12-02 RX ADMIN — SODIUM CHLORIDE, PRESERVATIVE FREE 10 ML: 5 INJECTION INTRAVENOUS at 10:33

## 2022-12-02 RX ADMIN — CARBIDOPA AND LEVODOPA 1 TABLET: 25; 100 TABLET ORAL at 10:34

## 2022-12-02 RX ADMIN — ACETAMINOPHEN 650 MG: 325 TABLET ORAL at 03:06

## 2022-12-02 RX ADMIN — METOPROLOL TARTRATE 12.5 MG: 25 TABLET ORAL at 10:33

## 2022-12-02 RX ADMIN — ONDANSETRON 4 MG: 4 TABLET, ORALLY DISINTEGRATING ORAL at 10:29

## 2022-12-02 RX ADMIN — ENTACAPONE 100 MG: 200 TABLET, FILM COATED ORAL at 17:36

## 2022-12-02 RX ADMIN — DESMOPRESSIN ACETATE 40 MG: 0.2 TABLET ORAL at 10:34

## 2022-12-02 RX ADMIN — AMITRIPTYLINE HYDROCHLORIDE 10 MG: 10 TABLET, FILM COATED ORAL at 21:38

## 2022-12-02 RX ADMIN — ROPINIROLE HYDROCHLORIDE 0.25 MG: 0.25 TABLET, FILM COATED ORAL at 21:38

## 2022-12-02 RX ADMIN — ENTACAPONE 100 MG: 200 TABLET, FILM COATED ORAL at 21:36

## 2022-12-02 RX ADMIN — GABAPENTIN 100 MG: 100 CAPSULE ORAL at 15:07

## 2022-12-02 RX ADMIN — CARBIDOPA AND LEVODOPA 1 TABLET: 25; 100 TABLET ORAL at 17:36

## 2022-12-02 RX ADMIN — ENTACAPONE 100 MG: 200 TABLET, FILM COATED ORAL at 10:33

## 2022-12-02 RX ADMIN — ROPINIROLE HYDROCHLORIDE 0.25 MG: 0.25 TABLET, FILM COATED ORAL at 10:34

## 2022-12-02 RX ADMIN — CARBIDOPA AND LEVODOPA 1 TABLET: 25; 100 TABLET ORAL at 21:38

## 2022-12-02 RX ADMIN — TAMSULOSIN HYDROCHLORIDE 0.4 MG: 0.4 CAPSULE ORAL at 10:34

## 2022-12-02 RX ADMIN — CARBIDOPA AND LEVODOPA 1 TABLET: 25; 100 TABLET ORAL at 13:34

## 2022-12-02 RX ADMIN — PANTOPRAZOLE SODIUM 40 MG: 40 TABLET, DELAYED RELEASE ORAL at 10:34

## 2022-12-02 RX ADMIN — GABAPENTIN 100 MG: 100 CAPSULE ORAL at 10:34

## 2022-12-02 RX ADMIN — SODIUM CHLORIDE, PRESERVATIVE FREE 10 ML: 5 INJECTION INTRAVENOUS at 21:39

## 2022-12-02 RX ADMIN — ENTACAPONE 100 MG: 200 TABLET, FILM COATED ORAL at 13:34

## 2022-12-02 RX ADMIN — ISOSORBIDE MONONITRATE 30 MG: 30 TABLET ORAL at 10:34

## 2022-12-02 RX ADMIN — ACETAMINOPHEN 650 MG: 325 TABLET ORAL at 10:33

## 2022-12-02 RX ADMIN — ONDANSETRON 4 MG: 4 TABLET, ORALLY DISINTEGRATING ORAL at 17:38

## 2022-12-02 RX ADMIN — ROPINIROLE HYDROCHLORIDE 0.25 MG: 0.25 TABLET, FILM COATED ORAL at 15:06

## 2022-12-02 ASSESSMENT — ENCOUNTER SYMPTOMS
VOMITING: 1
RHINORRHEA: 0
COUGH: 0
SORE THROAT: 0
ABDOMINAL PAIN: 0
EYE PAIN: 0
DIARRHEA: 0
SHORTNESS OF BREATH: 0
CONSTIPATION: 0
NAUSEA: 1

## 2022-12-02 ASSESSMENT — PAIN SCALES - GENERAL
PAINLEVEL_OUTOF10: 6
PAINLEVEL_OUTOF10: 5

## 2022-12-02 NOTE — CARE COORDINATION
Spoke with Lm Hodgson at [address], still awaiting precert.   I let her know patient ready for DC when precert obtained

## 2022-12-02 NOTE — PROGRESS NOTES
Occupational Therapy  Facility/Department: [facility] 3C OBSERVATION  Occupational Therapy Daily Treatment Note    Name: Dianna Rios  : 1940  MRN: [id number]  Date of Service: 2022    Discharge Recommendations:  Patient would benefit from continued therapy after discharge       Patient Diagnosis(es): The encounter diagnosis was Generalized weakness. 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 Past Surgical History:  has a past surgical history that includes Colonoscopy (2012); shoulder surgery (Right); pr colsc flx w/rmvl of tumor polyp lesion snare tq (N/A, 2017); Colonoscopy (2017); Cholecystectomy, laparoscopic (10/29/2022); and Cholecystectomy, laparoscopic (N/A, 10/29/2022). Assessment   Performance deficits / Impairments: Decreased functional mobility ; Decreased ADL status; Decreased ROM; Decreased strength;Decreased balance;Decreased high-level IADLs;Decreased posture;Decreased coordination  Assessment: Pt would benefit from continued therapy to increase IND/safety with ADL's.   Prognosis: Good  Activity Tolerance  Activity Tolerance: Patient Tolerated treatment well;Patient limited by fatigue        Plan   Occupational Therapy Plan  Times Per Week: 2-4x/week  Current Treatment Recommendations: Balance training, Functional mobility training, Endurance training, Pain management, Safety education & training, Patient/Caregiver education & training, Equipment evaluation, education, & procurement, Self-Care / ADL     Restrictions  Restrictions/Precautions  Restrictions/Precautions: Up as Tolerated, General Precautions  Required Braces or Orthoses?: No    Subjective   General  Patient assessed for rehabilitation services?: Yes  Family / Caregiver Present: No  General Comment  Comments: RN okayed for therapy. Pt agreeable to OT tx. Pt denies pain. Objective   O2 Device: None (Room air)  Safety Devices  Type of Devices: Call light within reach;Nurse notified; All fall risk precautions in place; Left in bed;Bed alarm in place  Bed Mobility Training  Bed Mobility Training: Yes  Supine to Sit: Minimum assistance; Additional time  Sit to Supine: Stand-by assistance  Scooting: Stand-by assistance  Balance  Sitting: Without support (seated on EOB ~25 min for self care SBA)  Standing: With support (stood ~1 min w/CGA)  Transfer Training  Transfer Training: Yes  Overall Level of Assistance: Contact-guard assistance; Additional time; Adaptive equipment  Interventions: Verbal cues  Sit to Stand: Contact-guard assistance  Stand to Sit: Contact-guard assistance        ADL  Feeding: Independent  Grooming: Stand by assistance;Verbal cueing;Setup; Increased time to complete (wash face, brush teeth, wash/comb hair)  UE Bathing: Stand by assistance;Verbal cueing; Increased time to complete;Setup (assist to wash back)  LE Bathing: Increased time to complete;Setup;Verbal cueing;Minimal assistance (assist to wash feet)  UE Dressing: Stand by assistance;Setup; Increased time to complete;Verbal cueing (assist to snap, thread and tie gown)  LE Dressing: Maximum assistance;Setup; Increased time to complete;Verbal cueing (assist to doff/don footies)  Toileting: Increased time to complete;Stand by assistance;Setup (wearing brief, using urinal)     Pt in bed upon arrival. Pt lethargic at start of tx. Washed face and became more alert.  Transferred to EOB and setup at tray table for self care (see above for LOF). STS and adjust linens in bed w/CGA. Pt retired back to supine, call light and phone in reach. Pt on RA, no SOB with increased activity.         Cognition  Overall Cognitive Status: WFL  Orientation  Overall Orientation Status: Within Functional Limits  Education Given To: Patient  Education Provided: Role of Therapy;Plan of Care;ADL Adaptive Strategies  Education Method: Verbal  Barriers to Learning: None  Education Outcome: Continued education needed;Verbalized understanding    AM-PAC Score  AM-PAC Inpatient Daily Activity Raw Score: 18 (12/02/22 1711)  AM-PAC Inpatient ADL T-Scale Score : 38.66 (12/02/22 1711)  ADL Inpatient CMS 0-100% Score: 46.65 (12/02/22 1711)  ADL Inpatient CMS G-Code Modifier : CK (12/02/22 1711)     Goals  Short Term Goals  Time Frame for Short Term Goals: By discharge  Short Term Goal 1: Pt will complete LB ADL's SBA with AE/DME/modified techniques as needed  Short Term Goal 2: Pt will complete UB ADL's Mod I with AE/DME/modified techniques as needed  Short Term Goal 3: Pt will complete functional mobility/transfers SBA with cane  Short Term Goal 4: Pt will demo Good safety throghout session without cuing  Short Term Goal 5: Pt will complete functional task while standing 5+ mins SBA with 1-2 UE support     Therapy Time   Individual Concurrent Group Co-treatment   Time In 1610         Time Out 1650         Minutes 40         Timed Code Treatment Minutes: [address] [place], [place]/L

## 2022-12-02 NOTE — PROGRESS NOTES
[address]  CDU / OBSERVATION ENCOUNTER  ATTENDING NOTE       I performed a history and physical examination of the patient and discussed management with the resident or midlevel provider. I reviewed the resident or midlevel provider's note and agree with the documented findings and plan of care. Any areas of disagreement are noted on the chart. I was personally present for the key portions of any procedures. I have documented in the chart those procedures where I was not present during the key portions. I have reviewed the nurses notes. I agree with the chief complaint, past medical history, past surgical history, allergies, medications, social and family history as documented unless otherwise noted below. The Family history, social history, and ROS are effectively unchanged since admission unless noted elsewhere in the chart. Patient has not had safe discharge because of ongoing nausea vomiting. Patient having difficulty with emesis and requiring ongoing supportive therapy and antiemetics. Passing p.o. challenge. Patient requiring more aggressive supportive than available at home. Patient for placement. Patient requiring inpatient services for ongoing IV fluids in the meantime.     Galdino Solares MD  Attending Emergency  Physician

## 2022-12-02 NOTE — PROGRESS NOTES
OBS/CDU   RESIDENT NOTE      Patients PCP is:  JW Wiggins - [facility]        SUBJECTIVE      No acute events overnight. Patient reports he is feeling funny this morning, however he did just receive all of his medications on an empty stomach. He reports persistent nausea and vomiting however has been able to tolerate a full diet. The patient is urinating on his own and is passing flatus. Denies fever, chills, nausea, vomiting, chest pain, shortness of breath, abdominal pain, focal weakness, numbness, tingling, urinary/bowel symptoms, vision changes, visual hallucinations, or headache. PHYSICAL EXAM      General: NAD, AO X 3  Heent: EMOI, PERRL  Neck: SUPPLE, NO JVD  Cardiovascular: RRR, S1S2  Pulmonary: CTAB, NO SOB  Abdomen: SOFT, NTTP, ND, +BS  Extremities: +2/4 PULSES DISTAL, NO SWELLING  Neuro / Psych: NO NUMBNESS OR TINGLING, MENTATION AT BASELINE    PERTINENT TEST /EXAMS      I have reviewed all available laboratory results. MEDICATIONS CURRENT   sodium chloride flush 0.9 % injection 5-40 mL, 2 times per day  sodium chloride flush 0.9 % injection 5-40 mL, PRN  0.9 % sodium chloride infusion, PRN  enoxaparin (LOVENOX) injection 40 mg, Daily  acetaminophen (TYLENOL) tablet 650 mg, Q4H PRN  ondansetron (ZOFRAN-ODT) disintegrating tablet 4 mg, Q8H PRN   Or  ondansetron (ZOFRAN) injection 4 mg, Q6H PRN  amitriptyline (ELAVIL) tablet 10 mg, Nightly  atorvastatin (LIPITOR) tablet 40 mg, Daily  carbidopa-levodopa (SINEMET)  MG per tablet 1 tablet, 4x Daily  entacapone (COMTAN) tablet 100 mg, 4x Daily  gabapentin (NEURONTIN) capsule 100 mg, TID  isosorbide mononitrate (IMDUR) extended release tablet 30 mg, Daily  metoprolol tartrate (LOPRESSOR) tablet 12.5 mg, BID  pantoprazole (PROTONIX) tablet 40 mg, QAM AC  rOPINIRole (REQUIP) tablet 0.25 mg, TID  tamsulosin (FLOMAX) capsule 0.4 mg, Daily  clopidogrel (PLAVIX) tablet 75 mg, Daily        All medication charted and reviewed.     CONSULTS      IP CONSULT TO CARDIOLOGY  IP CONSULT TO GENERAL SURGERY  IP CONSULT TO HOME CARE NEEDS    ASSESSMENT/PLAN       Lemuel Richards is a 79 y.o. male who presents with weakness, fatigue, nausea, and vomiting     Nausea, vomiting, fatigue  Patient is approximately 1 month postop from [facility] with Dr. Shai Espinosa. General surgery was consulted for assessment. Per general surgery, no acute intervention required at this time. Continue symptomatic management. On assessment, patient continues to tolerated diet appropriately. Weakness  Patient seen and evaluated by both physical therapy and Occupational Therapy who note the patient would benefit from outpatient therapy. Order placed for home care with PT and OT  Case management consulted to assess the patient for placement with a home care organization. Case management currently working on placement in a SNF. Patient has been able to tolerate a diet without difficulty. He does report persistent nausea. Patient stable for discharge when placement is available. Continue home medications and pain control  Monitor vitals, labs, and imaging  DISPO: pending placement    --  Sasha Krishnan,   Emergency Medicine Resident Physician     This dictation was generated by voice recognition computer software. Although all attempts are made to edit the dictation for accuracy, there may be errors in the transcription that are not intended.

## 2022-12-03 PROCEDURE — 6360000002 HC RX W HCPCS

## 2022-12-03 PROCEDURE — 1200000000 HC SEMI PRIVATE

## 2022-12-03 PROCEDURE — 2580000003 HC RX 258

## 2022-12-03 PROCEDURE — 6370000000 HC RX 637 (ALT 250 FOR IP)

## 2022-12-03 PROCEDURE — 97535 SELF CARE MNGMENT TRAINING: CPT

## 2022-12-03 RX ORDER — DIPHENHYDRAMINE HYDROCHLORIDE 50 MG/ML
25 INJECTION INTRAMUSCULAR; INTRAVENOUS ONCE
Status: COMPLETED | OUTPATIENT
Start: 2022-12-03 | End: 2022-12-03

## 2022-12-03 RX ADMIN — ROPINIROLE HYDROCHLORIDE 0.25 MG: 0.25 TABLET, FILM COATED ORAL at 20:52

## 2022-12-03 RX ADMIN — TAMSULOSIN HYDROCHLORIDE 0.4 MG: 0.4 CAPSULE ORAL at 09:45

## 2022-12-03 RX ADMIN — GABAPENTIN 100 MG: 100 CAPSULE ORAL at 13:45

## 2022-12-03 RX ADMIN — ISOSORBIDE MONONITRATE 30 MG: 30 TABLET ORAL at 09:45

## 2022-12-03 RX ADMIN — GABAPENTIN 100 MG: 100 CAPSULE ORAL at 20:52

## 2022-12-03 RX ADMIN — ENTACAPONE 100 MG: 200 TABLET, FILM COATED ORAL at 20:52

## 2022-12-03 RX ADMIN — ENOXAPARIN SODIUM 40 MG: 100 INJECTION SUBCUTANEOUS at 09:45

## 2022-12-03 RX ADMIN — SODIUM CHLORIDE, PRESERVATIVE FREE 10 ML: 5 INJECTION INTRAVENOUS at 09:30

## 2022-12-03 RX ADMIN — GABAPENTIN 100 MG: 100 CAPSULE ORAL at 09:45

## 2022-12-03 RX ADMIN — ROPINIROLE HYDROCHLORIDE 0.25 MG: 0.25 TABLET, FILM COATED ORAL at 13:45

## 2022-12-03 RX ADMIN — ENTACAPONE 100 MG: 200 TABLET, FILM COATED ORAL at 13:45

## 2022-12-03 RX ADMIN — CARBIDOPA AND LEVODOPA 1 TABLET: 25; 100 TABLET ORAL at 13:45

## 2022-12-03 RX ADMIN — CLOPIDOGREL 75 MG: 75 TABLET, FILM COATED ORAL at 09:30

## 2022-12-03 RX ADMIN — ENTACAPONE 100 MG: 200 TABLET, FILM COATED ORAL at 17:13

## 2022-12-03 RX ADMIN — DESMOPRESSIN ACETATE 40 MG: 0.2 TABLET ORAL at 09:46

## 2022-12-03 RX ADMIN — Medication 25 MG: at 01:00

## 2022-12-03 RX ADMIN — CARBIDOPA AND LEVODOPA 1 TABLET: 25; 100 TABLET ORAL at 09:45

## 2022-12-03 RX ADMIN — ENTACAPONE 100 MG: 200 TABLET, FILM COATED ORAL at 09:45

## 2022-12-03 RX ADMIN — AMITRIPTYLINE HYDROCHLORIDE 10 MG: 10 TABLET, FILM COATED ORAL at 20:52

## 2022-12-03 RX ADMIN — SODIUM CHLORIDE, PRESERVATIVE FREE 10 ML: 5 INJECTION INTRAVENOUS at 20:52

## 2022-12-03 RX ADMIN — ROPINIROLE HYDROCHLORIDE 0.25 MG: 0.25 TABLET, FILM COATED ORAL at 09:45

## 2022-12-03 RX ADMIN — CARBIDOPA AND LEVODOPA 1 TABLET: 25; 100 TABLET ORAL at 17:13

## 2022-12-03 RX ADMIN — PANTOPRAZOLE SODIUM 40 MG: 40 TABLET, DELAYED RELEASE ORAL at 09:46

## 2022-12-03 RX ADMIN — CARBIDOPA AND LEVODOPA 1 TABLET: 25; 100 TABLET ORAL at 20:52

## 2022-12-03 ASSESSMENT — PAIN SCALES - GENERAL: PAINLEVEL_OUTOF10: 0

## 2022-12-03 NOTE — PLAN OF CARE
Problem: Chronic Conditions and Co-morbidities  Goal: Patient's chronic conditions and co-morbidity symptoms are monitored and maintained or improved  Outcome: Progressing     Problem: Pain  Goal: Verbalizes/displays adequate comfort level or baseline comfort level  Outcome: Progressing     Problem: Safety - Adult  Goal: Free from fall injury  Outcome: Progressing     Problem: ABCDS Injury Assessment  Goal: Absence of physical injury  Outcome: Progressing  Flowsheets (Taken 12/2/2022 1939 by Joselito Morgan RN)  Absence of Physical Injury: Implement safety measures based on patient assessment     Problem: Discharge Planning  Goal: Discharge to home or other facility with appropriate resources  Outcome: Progressing     Problem: Infection - Adult  Goal: Absence of infection at discharge  Outcome: Progressing  Goal: Absence of infection during hospitalization  Outcome: Progressing  Goal: Absence of fever/infection during anticipated neutropenic period  Outcome: Progressing

## 2022-12-03 NOTE — PLAN OF CARE
Problem: Chronic Conditions and Co-morbidities  Goal: Patient's chronic conditions and co-morbidity symptoms are monitored and maintained or improved  12/3/2022 0431 by Randy Velasco RN  Outcome: Progressing  12/3/2022 0413 by Randy Velasco RN  Outcome: Progressing     Problem: Pain  Goal: Verbalizes/displays adequate comfort level or baseline comfort level  12/3/2022 0431 by Randy Velasco RN  Outcome: Progressing  12/3/2022 0413 by Radny Velasco RN  Outcome: Progressing     Problem: Safety - Adult  Goal: Free from fall injury  12/3/2022 0431 by Randy Velasco RN  Outcome: Progressing  12/3/2022 0413 by Randy Velasco RN  Outcome: Progressing     Problem: ABCDS Injury Assessment  Goal: Absence of physical injury  12/3/2022 0431 by Randy Velasco RN  Outcome: Progressing  12/3/2022 0413 by Randy Velasco RN  Outcome: Progressing  Flowsheets (Taken 12/2/2022 1939 by Stevo Santoyo RN)  Absence of Physical Injury: Implement safety measures based on patient assessment     Problem: Discharge Planning  Goal: Discharge to home or other facility with appropriate resources  12/3/2022 0431 by Randy Velasco RN  Outcome: Progressing  12/3/2022 0413 by Randy Velasco RN  Outcome: Progressing     Problem: Infection - Adult  Goal: Absence of infection at discharge  12/3/2022 0431 by Randy Velasco RN  Outcome: Progressing  12/3/2022 0413 by Randy Velasco RN  Outcome: Progressing  Goal: Absence of infection during hospitalization  12/3/2022 0431 by Randy Velasco RN  Outcome: Progressing  12/3/2022 0413 by Randy Velasco RN  Outcome: Progressing  Goal: Absence of fever/infection during anticipated neutropenic period  12/3/2022 0431 by Randy Velasco RN  Outcome: Progressing  12/3/2022 0413 by Randy Velasco RN  Outcome: Progressing     Problem: Neurosensory - Adult  Goal: Achieves stable or improved neurological status  Outcome: Progressing  Goal: Absence of seizures  Outcome: Progressing  Goal: Remains free of injury related to seizures activity  Outcome: Progressing  Goal: Achieves maximal functionality and self care  Outcome: Progressing     Problem: Musculoskeletal - Adult  Goal: Return mobility to safest level of function  Outcome: Progressing  Goal: Maintain proper alignment of affected body part  Outcome: Progressing  Goal: Return ADL status to a safe level of function  Outcome: Progressing     Problem: Gastrointestinal - Adult  Goal: Minimal or absence of nausea and vomiting  Outcome: Progressing  Goal: Maintains or returns to baseline bowel function  Outcome: Progressing  Goal: Maintains adequate nutritional intake  Outcome: Progressing  Goal: Establish and maintain optimal ostomy function  Outcome: Progressing

## 2022-12-03 NOTE — PROGRESS NOTES
OBS/CDU   RESIDENT NOTE      Patients PCP is:  Segundo Dubose, JW - [facility]        SUBJECTIVE      No acute events overnight. Says he is feeling better today. He reports intermittent nausea and vomiting however has been able to tolerate a full diet. The patient is urinating on his own and is passing flatus. Denies fever, chills, nausea, vomiting, chest pain, shortness of breath, abdominal pain, focal weakness, numbness, tingling, urinary/bowel symptoms, vision changes, visual hallucinations, or headache. PHYSICAL EXAM      General: NAD, AO X 3  Heent: EMOI, PERRL  Neck: SUPPLE, NO JVD  Cardiovascular: RRR, S1S2  Pulmonary: CTAB, NO SOB  Abdomen: SOFT, NTTP, ND, +BS  Extremities: +2/4 PULSES DISTAL, NO SWELLING  Neuro / Psych: NO NUMBNESS OR TINGLING, MENTATION AT BASELINE    PERTINENT TEST /EXAMS      I have reviewed all available laboratory results. MEDICATIONS CURRENT   sodium chloride flush 0.9 % injection 5-40 mL, 2 times per day  sodium chloride flush 0.9 % injection 5-40 mL, PRN  0.9 % sodium chloride infusion, PRN  enoxaparin (LOVENOX) injection 40 mg, Daily  acetaminophen (TYLENOL) tablet 650 mg, Q4H PRN  ondansetron (ZOFRAN-ODT) disintegrating tablet 4 mg, Q8H PRN   Or  ondansetron (ZOFRAN) injection 4 mg, Q6H PRN  amitriptyline (ELAVIL) tablet 10 mg, Nightly  atorvastatin (LIPITOR) tablet 40 mg, Daily  carbidopa-levodopa (SINEMET)  MG per tablet 1 tablet, 4x Daily  entacapone (COMTAN) tablet 100 mg, 4x Daily  gabapentin (NEURONTIN) capsule 100 mg, TID  isosorbide mononitrate (IMDUR) extended release tablet 30 mg, Daily  metoprolol tartrate (LOPRESSOR) tablet 12.5 mg, BID  pantoprazole (PROTONIX) tablet 40 mg, QAM AC  rOPINIRole (REQUIP) tablet 0.25 mg, TID  tamsulosin (FLOMAX) capsule 0.4 mg, Daily  clopidogrel (PLAVIX) tablet 75 mg, Daily      All medication charted and reviewed.     CONSULTS      IP CONSULT TO CARDIOLOGY  IP CONSULT TO GENERAL SURGERY  IP CONSULT TO HOME CARE NEEDS    ASSESSMENT/PLAN       Rylie Malone is a 79 y.o. male who presents with weakness, fatigue, nausea, and vomiting     Nausea, vomiting, fatigue  Patient is approximately 1 month postop from [facility] with Dr. Corrina Sung. General surgery was consulted for assessment. Per general surgery, no acute intervention required at this time. Continue symptomatic management. On assessment, patient continues to tolerated diet appropriately. Weakness  Patient seen and evaluated by both physical therapy and Occupational Therapy who note the patient would benefit from outpatient therapy. Order placed for home care with PT and OT  Case management consulted to assess the patient for placement with a home care organization. Case management currently working on placement in a SNF. Patient has been able to tolerate a diet without difficulty. He does report persistent nausea. Patient stable for discharge when placement is available. Continue home medications and pain control  Monitor vitals, labs, and imaging  DISPO: pending placement    --  Yesi Ac MD  Emergency Medicine Resident Physician     This dictation was generated by voice recognition computer software. Although all attempts are made to edit the dictation for accuracy, there may be errors in the transcription that are not intended.

## 2022-12-03 NOTE — PROGRESS NOTES
Occupational Therapy  Facility/Department: [address] STEPDOWN  Occupational Therapy Daily Treatment Note    Name: Ricardo Conteh  : 1940  MRN: [id number]  Date of Service: 12/3/2022    Discharge Recommendations:  Patient would benefit from continued therapy after discharge    Patient Diagnosis(es): The encounter diagnosis was Generalized weakness. 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 ([facility] [address] CKD (chronic kidney disease) stage 2, GFR 60-89 ml/min, CKD (chronic kidney disease) stage 3, GFR 30-59 ml/min ([facility]), Diverticulosis of colon, GERD (gastroesophageal reflux disease), History of non-ST elevation myocardial infarction (NSTEMI) 2022, Impaired renal function, MRSA (methicillin resistant staph aureus) culture positive, Osteoarthritis of knee, Parkinson disease ([facility] [address] Proteinuria, Skull fracture ([facility] [address] Temporary loss of eyesight, and Tubular adenoma of colon. Past Surgical History:  has a past surgical history that includes Colonoscopy (2012); shoulder surgery (Right); pr colsc flx w/rmvl of tumor polyp lesion snare tq (N/A, 2017); Colonoscopy (2017); Cholecystectomy, laparoscopic (10/29/2022); and Cholecystectomy, laparoscopic (N/A, 10/29/2022). Assessment   Performance deficits / Impairments: Decreased functional mobility ; Decreased ADL status; Decreased ROM; Decreased strength;Decreased balance;Decreased high-level IADLs;Decreased posture;Decreased coordination  Assessment: Pt would benefit from continued therapy to increase IND/safety with ADL's.   Prognosis: Good  REQUIRES OT FOLLOW-UP: Yes  Activity Tolerance  Activity Tolerance: Patient Tolerated treatment well;Patient limited by fatigue        Plan   Occupational Therapy Plan  Times Per Week: 2-4x/week  Current Treatment Recommendations: Balance training, Functional mobility training, Endurance training, Pain management, Safety education & training, Patient/Caregiver education & training, Equipment evaluation, education, & procurement, Self-Care / ADL     Restrictions  Restrictions/Precautions  Restrictions/Precautions: Up as Tolerated, General Precautions  Required Braces or Orthoses?: No    Subjective   General  Patient assessed for rehabilitation services?: Yes  Response to previous treatment: Patient with no complaints from previous session  Family / Caregiver Present: No  Subjective  Subjective: Pt reported previously having bouts of feeling extremely exhausted with profuse sweating. Pt reported that this had not happened in the hospital. Pt did not report any pain at this time and is willing to participate in therapy. General Comment  Comments: RN okayed for therapy. Pt agreeable to OT session. Pt was pleasant and cooperative throughout session. Upon arrival pt supine in bed with HOB elevated. Pt completed func mob from the EOB to the toilet and then to the chair w/ walker. Pt completed ADLs in chair after completing toileting and brief management. When session was complete pt retired to chair with chair alarm, call light within reach and aide notified, RN on lunch break. Objective   O2 Device: None (Room air)    Safety Devices  Type of Devices: Call light within reach; All fall risk precautions in place; Chair alarm in place; Left in chair;Patient at risk for falls (Aide notified, RN at lunch)  Restraints  Restraints Initially in Place: No  Balance  Sitting: Without support (pt sat EOB, toilet and recliner, total ~20 mins SUP.)  Standing: With support (w/ walker, pt stood ~6 mins, CGA)  Gait  Speed/Ewa: Slow;Shuffled  Distance (ft):  (pt completed functional mobility of household distances from bed to bathroom and back to recliner. w/ walker  CGA)  Assistive Device: Walker  Toilet Transfers  Toilet - Technique: Ambulating (w/ walker)  Equipment Used: Standard toilet  Toilet Transfer: Moderate assistance (w/ walker)  Toilet Transfers Comments:  Mod A d/t low toilet in bathroom. ADL  Grooming: Stand by assistance;Verbal cueing;Setup; Increased time to complete  Grooming Skilled Clinical Factors: Pt completed oral/denture care, washing face and hands seated in chair. UE Dressing: Stand by assistance;Setup; Increased time to complete;Verbal cueing  UE Dressing Skilled Clinical Factors: Pt doffed/don dirty then clean gown, threaded arms,  assistance  with tying gown  Toileting: Increased time to complete;Setup;Minimal assistance  Toileting Skilled Clinical Factors: Pt completed pericare, needed min assistance doffing/donning brief. Bed mobility  Supine to Sit: Minimal assistance (w/ progression of LE)  Sit to Supine: Stand by assistance (pt retires to DOMINGA Funes)  Scooting: Contact guard assistance  Bed Mobility Comments: Used bed rails  Transfers  Sit to stand: Minimal assistance (w/ walker)  Stand to sit: Minimal assistance (w/ walker)     Cognition  Overall Cognitive Status: WFL  Orientation  Overall Orientation Status: Within Functional Limits  Orientation Level: Oriented X4     Education Provided: Role of Therapy;Plan of Care;ADL Adaptive Strategies; Equipment;Transfer Training  Education Provided Comments: POC, pursed lip breathing, waiting before completing func mobility, walker management, proper hands/feet position for standing w/ walker -good return  Education Method: Verbal;Demonstration  Barriers to Learning: Vision  Education Outcome: Continued education needed;Verbalized understanding    AM-PAC Score  AM-[facility] Inpatient Daily Activity Raw Score: 18 (12/03/22 1634)  AM-PAC Inpatient ADL T-Scale Score : 38.66 (12/03/22 1634)  ADL Inpatient CMS 0-100% Score: 46.65 (12/03/22 1634)  ADL Inpatient CMS G-Code Modifier : CK (12/03/22 1634)    Goals  Short Term Goals  Time Frame for Short Term Goals: By discharge  Short Term Goal 1: Pt will complete LB ADL's SBA with AE/DME/modified techniques as needed  Short Term Goal 2: Pt will complete UB ADL's Mod I with AE/DME/modified techniques as needed  Short Term Goal 3: Pt will complete functional mobility/transfers SBA with cane  Short Term Goal 4: Pt will demo Good safety throghout session without cuing  Short Term Goal 5: Pt will complete functional task while standing 5+ mins SBA with 1-2 UE support       Therapy Time   Individual Concurrent Group Co-treatment   Time In 5705         Time Out 1519         Minutes 45         Timed Code Treatment Minutes: [facility], [place]/L

## 2022-12-03 NOTE — PLAN OF CARE
Problem: Chronic Conditions and Co-morbidities  Goal: Patient's chronic conditions and co-morbidity symptoms are monitored and maintained or improved  12/3/2022 1133 by Nino Grigsby RN  Outcome: Progressing  12/3/2022 0431 by Alisa Oviedo RN  Outcome: Progressing  12/3/2022 0413 by Alisa Oviedo RN  Outcome: Progressing     Problem: Pain  Goal: Verbalizes/displays adequate comfort level or baseline comfort level  12/3/2022 1133 by Nino Grigsby RN  Outcome: Progressing  12/3/2022 0431 by Alisa Oviedo RN  Outcome: Progressing  12/3/2022 0413 by Alisa Oviedo RN  Outcome: Progressing     Problem: Safety - Adult  Goal: Free from fall injury  12/3/2022 1133 by Nino Grigsby RN  Outcome: Progressing  12/3/2022 0431 by Alisa Oviedo RN  Outcome: Progressing  12/3/2022 0413 by Alisa Oviedo RN  Outcome: Progressing     Problem: ABCDS Injury Assessment  Goal: Absence of physical injury  12/3/2022 1133 by Nino Grigsby RN  Outcome: Progressing  12/3/2022 0431 by Alisa Oviedo RN  Outcome: Progressing  12/3/2022 0413 by Alisa Oviedo RN  Outcome: Progressing  Flowsheets (Taken 12/2/2022 1939 by Soila Mendoza RN)  Absence of Physical Injury: Implement safety measures based on patient assessment     Problem: Discharge Planning  Goal: Discharge to home or other facility with appropriate resources  12/3/2022 1133 by Nino Grigsby RN  Outcome: Progressing  12/3/2022 0431 by Alisa Oviedo RN  Outcome: Progressing  12/3/2022 0413 by Alisa Oviedo RN  Outcome: Progressing     Problem: Infection - Adult  Goal: Absence of infection at discharge  12/3/2022 1133 by Nino Grigsby RN  Outcome: Progressing  12/3/2022 0431 by Alisa Oviedo RN  Outcome: Progressing  12/3/2022 0413 by Alisa Oviedo RN  Outcome: Progressing  Goal: Absence of infection during hospitalization  12/3/2022 1133 by Nino Grigsby RN  Outcome: Progressing  12/3/2022 0431 by Alisa Oviedo RN  Outcome: Progressing  12/3/2022 0413 by Alisa Oviedo RN  Outcome: Progressing  Goal: Absence of fever/infection during anticipated neutropenic period  12/3/2022 1133 by Nino Grigsby RN  Outcome: Progressing  12/3/2022 0431 by Alisa Oviedo RN  Outcome: Progressing  12/3/2022 0413 by Alisa Oviedo RN  Outcome: Progressing     Problem: Neurosensory - Adult  Goal: Achieves stable or improved neurological status  12/3/2022 1133 by Nino Grigsby RN  Outcome: Progressing  12/3/2022 0431 by Alisa Oviedo RN  Outcome: Progressing  Goal: Absence of seizures  12/3/2022 1133 by Nino Grigsby RN  Outcome: Progressing  12/3/2022 0431 by Alisa Oviedo RN  Outcome: Progressing  Goal: Remains free of injury related to seizures activity  12/3/2022 1133 by Nino Grigsby RN  Outcome: Progressing  12/3/2022 0431 by Alisa Oviedo RN  Outcome: Progressing  Goal: Achieves maximal functionality and self care  12/3/2022 1133 by Nino Grigsby RN  Outcome: Progressing  12/3/2022 0431 by Alisa Oviedo RN  Outcome: Progressing     Problem: Musculoskeletal - Adult  Goal: Return mobility to safest level of function  12/3/2022 1133 by Nino Grigsby RN  Outcome: Progressing  12/3/2022 0431 by Alisa Oviedo RN  Outcome: Progressing  Goal: Maintain proper alignment of affected body part  12/3/2022 1133 by Nino Grigsby RN  Outcome: Progressing  12/3/2022 0431 by Alisa Oviedo RN  Outcome: Progressing  Goal: Return ADL status to a safe level of function  12/3/2022 1133 by Nino Grigsby RN  Outcome: Progressing  12/3/2022 0431 by Alisa Oviedo RN  Outcome: Progressing     Problem: Gastrointestinal - Adult  Goal: Minimal or absence of nausea and vomiting  12/3/2022 1133 by Nino Grigsby RN  Outcome: Progressing  12/3/2022 0431 by Alisa Oviedo RN  Outcome: Progressing  Goal: Maintains or returns to baseline bowel function  12/3/2022 1133 by Nino Grigsby RN  Outcome: Progressing  12/3/2022 0431 by Alisa Oviedo RN  Outcome: Progressing  Goal: Maintains adequate nutritional intake  12/3/2022 1133 by Irving Jones RN  Outcome: Progressing  12/3/2022 0431 by Meenakshi Leal RN  Outcome: Progressing  Goal: Establish and maintain optimal ostomy function  12/3/2022 1133 by Irving Jones RN  Outcome: Progressing  12/3/2022 0431 by Meenakshi Leal RN  Outcome: Progressing

## 2022-12-03 NOTE — PROGRESS NOTES
Physician Progress Note      Bailey Sanz  [facility] #:                  [id number]  :                       1940  ADMIT DATE:       2022 2:33 PM  [address] [place] DATE:  RESPONDING  PROVIDER #:        Miko Sultana MD          QUERY TEXT:    Pt admitted with nausea vomiting for past 5 days prior to admission with   weakness and lightheadedness. recent cholecystectomy. per cardiology consult   dizziness and nausea likely secondary to volume depletion due to vomiting. please clarify one of the following. co morbid condition of Gerd : If possible   please clarify one of the following: The medical record reflects the following:  Risk Factors: recent cholecystectomy, Gerd,  Clinical Indicators:  admitted with nausea vomiting for past 5 days prior to   admission with weakness and lightheadedness. recent cholecystectomy. per   cardiology consult dizziness and nausea likely secondary to volume depletion   due to vomiting. please clarify one of the following. co morbid condition of   Gerd  Treatment: zofran, iv fluids, protonix, phenergan        Thank Aaliyah Valdes BSN  CCDS  Email [email or web address]. [email or web address]  Cell [phone]  office hours M-F 6am to 2:30pm  Options provided:  -- dizziness and nausea likely secondary to volume depletion/dehydration  -- nausea/vomiting likely secondary to Orestes  -- Other - I will add my own diagnosis  -- Disagree - Not applicable / Not valid  -- Disagree - Clinically unable to determine / Unknown  -- Refer to Clinical Documentation Reviewer    PROVIDER RESPONSE TEXT:    This patients dizziness and nausea likely secondary to volume   depletion/dehydration due to vomiting    Query created by:  Samuel Silverio on 2022 2:14 PM      Electronically signed by:  Miko Sultana MD 2022 10:07 PM

## 2022-12-04 PROCEDURE — 6370000000 HC RX 637 (ALT 250 FOR IP)

## 2022-12-04 PROCEDURE — 1200000000 HC SEMI PRIVATE

## 2022-12-04 PROCEDURE — 6360000002 HC RX W HCPCS

## 2022-12-04 PROCEDURE — 2580000003 HC RX 258

## 2022-12-04 RX ADMIN — CLOPIDOGREL 75 MG: 75 TABLET, FILM COATED ORAL at 09:54

## 2022-12-04 RX ADMIN — CARBIDOPA AND LEVODOPA 1 TABLET: 25; 100 TABLET ORAL at 12:49

## 2022-12-04 RX ADMIN — ENOXAPARIN SODIUM 40 MG: 100 INJECTION SUBCUTANEOUS at 09:54

## 2022-12-04 RX ADMIN — ENTACAPONE 100 MG: 200 TABLET, FILM COATED ORAL at 17:20

## 2022-12-04 RX ADMIN — METOPROLOL TARTRATE 12.5 MG: 25 TABLET ORAL at 21:01

## 2022-12-04 RX ADMIN — GABAPENTIN 100 MG: 100 CAPSULE ORAL at 09:53

## 2022-12-04 RX ADMIN — PANTOPRAZOLE SODIUM 40 MG: 40 TABLET, DELAYED RELEASE ORAL at 06:44

## 2022-12-04 RX ADMIN — ROPINIROLE HYDROCHLORIDE 0.25 MG: 0.25 TABLET, FILM COATED ORAL at 09:53

## 2022-12-04 RX ADMIN — AMITRIPTYLINE HYDROCHLORIDE 10 MG: 10 TABLET, FILM COATED ORAL at 21:03

## 2022-12-04 RX ADMIN — SODIUM CHLORIDE, PRESERVATIVE FREE 10 ML: 5 INJECTION INTRAVENOUS at 21:04

## 2022-12-04 RX ADMIN — SODIUM CHLORIDE, PRESERVATIVE FREE 10 ML: 5 INJECTION INTRAVENOUS at 09:54

## 2022-12-04 RX ADMIN — ROPINIROLE HYDROCHLORIDE 0.25 MG: 0.25 TABLET, FILM COATED ORAL at 21:01

## 2022-12-04 RX ADMIN — ENTACAPONE 100 MG: 200 TABLET, FILM COATED ORAL at 09:53

## 2022-12-04 RX ADMIN — CARBIDOPA AND LEVODOPA 1 TABLET: 25; 100 TABLET ORAL at 21:03

## 2022-12-04 RX ADMIN — ISOSORBIDE MONONITRATE 30 MG: 30 TABLET ORAL at 09:53

## 2022-12-04 RX ADMIN — GABAPENTIN 100 MG: 100 CAPSULE ORAL at 15:28

## 2022-12-04 RX ADMIN — TAMSULOSIN HYDROCHLORIDE 0.4 MG: 0.4 CAPSULE ORAL at 09:53

## 2022-12-04 RX ADMIN — ENTACAPONE 100 MG: 200 TABLET, FILM COATED ORAL at 21:22

## 2022-12-04 RX ADMIN — GABAPENTIN 100 MG: 100 CAPSULE ORAL at 21:02

## 2022-12-04 RX ADMIN — METOPROLOL TARTRATE 12.5 MG: 25 TABLET ORAL at 09:53

## 2022-12-04 RX ADMIN — ACETAMINOPHEN 650 MG: 325 TABLET ORAL at 04:07

## 2022-12-04 RX ADMIN — CARBIDOPA AND LEVODOPA 1 TABLET: 25; 100 TABLET ORAL at 09:53

## 2022-12-04 RX ADMIN — CARBIDOPA AND LEVODOPA 1 TABLET: 25; 100 TABLET ORAL at 17:20

## 2022-12-04 RX ADMIN — DESMOPRESSIN ACETATE 40 MG: 0.2 TABLET ORAL at 09:53

## 2022-12-04 RX ADMIN — ROPINIROLE HYDROCHLORIDE 0.25 MG: 0.25 TABLET, FILM COATED ORAL at 15:28

## 2022-12-04 RX ADMIN — ENTACAPONE 100 MG: 200 TABLET, FILM COATED ORAL at 12:49

## 2022-12-04 ASSESSMENT — PAIN SCALES - GENERAL
PAINLEVEL_OUTOF10: 3
PAINLEVEL_OUTOF10: 0

## 2022-12-04 ASSESSMENT — PAIN DESCRIPTION - ORIENTATION: ORIENTATION: RIGHT

## 2022-12-04 ASSESSMENT — PAIN DESCRIPTION - DESCRIPTORS: DESCRIPTORS: THROBBING

## 2022-12-04 ASSESSMENT — PAIN DESCRIPTION - LOCATION: LOCATION: HEAD

## 2022-12-04 NOTE — PLAN OF CARE
Problem: Chronic Conditions and Co-morbidities  Goal: Patient's chronic conditions and co-morbidity symptoms are monitored and maintained or improved  Outcome: Progressing     Problem: Pain  Goal: Verbalizes/displays adequate comfort level or baseline comfort level  Outcome: Progressing     Problem: Safety - Adult  Goal: Free from fall injury  Outcome: Progressing     Problem: ABCDS Injury Assessment  Goal: Absence of physical injury  Outcome: Progressing     Problem: Discharge Planning  Goal: Discharge to home or other facility with appropriate resources  Outcome: Progressing     Problem: Infection - Adult  Goal: Absence of infection at discharge  Outcome: Progressing     Problem: Infection - Adult  Goal: Absence of infection during hospitalization  Outcome: Progressing     Problem: Infection - Adult  Goal: Absence of fever/infection during anticipated neutropenic period  Outcome: Progressing     Problem: Neurosensory - Adult  Goal: Achieves stable or improved neurological status  Outcome: Progressing     Problem: Neurosensory - Adult  Goal: Achieves maximal functionality and self care  Outcome: Progressing

## 2022-12-04 NOTE — PROGRESS NOTES
OBS/CDU   RESIDENT NOTE      Patients PCP is:  JW Posadas - [facility]        SUBJECTIVE      No acute events overnight. Patient states he is not feeling well this morning. He has been able to get up and ambulate to and from the bathroom. He reports continued nausea and vomiting however has been able to tolerate a full diet. The patient is urinating on his own and is passing flatus. Denies fever, chills, nausea, vomiting, chest pain, shortness of breath, abdominal pain, focal weakness, numbness, tingling, urinary/bowel symptoms, vision changes, visual hallucinations, or headache. PHYSICAL EXAM      General: NAD, AO X 3  Heent: EMOI, PERRL  Neck: SUPPLE, NO JVD  Cardiovascular: RRR, S1S2  Pulmonary: CTAB, NO SOB  Abdomen: SOFT, NTTP, ND, +BS  Extremities: +2/4 PULSES DISTAL, NO SWELLING  Neuro / Psych: NO NUMBNESS OR TINGLING, MENTATION AT BASELINE    PERTINENT TEST /EXAMS      I have reviewed all available laboratory results. MEDICATIONS CURRENT   sodium chloride flush 0.9 % injection 5-40 mL, 2 times per day  sodium chloride flush 0.9 % injection 5-40 mL, PRN  0.9 % sodium chloride infusion, PRN  enoxaparin (LOVENOX) injection 40 mg, Daily  acetaminophen (TYLENOL) tablet 650 mg, Q4H PRN  ondansetron (ZOFRAN-ODT) disintegrating tablet 4 mg, Q8H PRN   Or  ondansetron (ZOFRAN) injection 4 mg, Q6H PRN  amitriptyline (ELAVIL) tablet 10 mg, Nightly  atorvastatin (LIPITOR) tablet 40 mg, Daily  carbidopa-levodopa (SINEMET)  MG per tablet 1 tablet, 4x Daily  entacapone (COMTAN) tablet 100 mg, 4x Daily  gabapentin (NEURONTIN) capsule 100 mg, TID  isosorbide mononitrate (IMDUR) extended release tablet 30 mg, Daily  metoprolol tartrate (LOPRESSOR) tablet 12.5 mg, BID  pantoprazole (PROTONIX) tablet 40 mg, QAM AC  rOPINIRole (REQUIP) tablet 0.25 mg, TID  tamsulosin (FLOMAX) capsule 0.4 mg, Daily  clopidogrel (PLAVIX) tablet 75 mg, Daily      All medication charted and reviewed.     CONSULTS      IP CONSULT TO CARDIOLOGY  IP CONSULT TO GENERAL SURGERY  IP CONSULT TO HOME CARE NEEDS    ASSESSMENT/PLAN       Jameel Alexis is a 79 y.o. male who presents with weakness, fatigue, nausea, and vomiting     Nausea, vomiting, fatigue  Patient is approximately 1 month postop from [facility] with Dr. Christopher Donaldson. General surgery was consulted for assessment. Per general surgery, no acute intervention required at this time. Continue symptomatic management. On assessment, patient continues to tolerated diet appropriately. Weakness  Patient seen and evaluated by both physical therapy and Occupational Therapy who note the patient would benefit from outpatient therapy. Order placed for home care with PT and OT  Case management consulted to assess the patient for placement with a home care organization. Case management currently working on placement in a SNF. Patient has been able to tolerate a diet without difficulty. He does report persistent nausea. Patient stable for discharge when placement is available. Continue home medications and pain control  Monitor vitals, labs, and imaging  DISPO: pending placement    --  Kash Levine, DO  Emergency Medicine Resident Physician     This dictation was generated by voice recognition computer software. Although all attempts are made to edit the dictation for accuracy, there may be errors in the transcription that are not intended.

## 2022-12-04 NOTE — PROGRESS NOTES
[address]  CDU / OBSERVATION ENCOUNTER  ATTENDING NOTE       I performed a history and physical examination of the patient and discussed management with the resident or midlevel provider. I reviewed the resident or midlevel provider's note and agree with the documented findings and plan of care. Any areas of disagreement are noted on the chart. I was personally present for the key portions of any procedures. I have documented in the chart those procedures where I was not present during the key portions. I have reviewed the nurses notes. I agree with the chief complaint, past medical history, past surgical history, allergies, medications, social and family history as documented unless otherwise noted below. The Family history, social history, and ROS are effectively unchanged since admission unless noted elsewhere in the chart. No new events today. Patient awaiting precertification. Patient for ECF placement.     Selene De Guzman MD  Attending Emergency  Physician

## 2022-12-04 NOTE — PROGRESS NOTES
[address]  CDU / OBSERVATION ENCOUNTER  ATTENDING NOTE       I performed a history and physical examination of the patient and discussed management with the resident or midlevel provider. I reviewed the resident or midlevel provider's note and agree with the documented findings and plan of care. Any areas of disagreement are noted on the chart. I was personally present for the key portions of any procedures. I have documented in the chart those procedures where I was not present during the key portions. I have reviewed the nurses notes. I agree with the chief complaint, past medical history, past surgical history, allergies, medications, social and family history as documented unless otherwise noted below. The Family history, social history, and ROS are effectively unchanged since admission unless noted elsewhere in the chart. Patient was admitted to the ETU for abdominal pain, nausea and vomiting. Patient states that he does continue to have the symptoms but was able to tolerate a full breakfast this morning without difficulty. Patient appears well on my assessment this morning. Patient is currently awaiting pre-CERT for placement to a SNF.     Ruth Butcher MD  Attending Emergency  Physician

## 2022-12-04 NOTE — CARE COORDINATION
Transitional planning. Awaiting pre-cert for [facility] of [address] [place] [address].  Need PT/ Note

## 2022-12-04 NOTE — PLAN OF CARE
Problem: Chronic Conditions and Co-morbidities  Goal: Patient's chronic conditions and co-morbidity symptoms are monitored and maintained or improved  12/3/2022 2336 by John Paul Patterson  Outcome: Progressing  12/3/2022 1133 by Johnie Monzon RN  Outcome: Progressing     Problem: Pain  Goal: Verbalizes/displays adequate comfort level or baseline comfort level  12/3/2022 2336 by Army Cr  Outcome: Progressing  12/3/2022 1133 by Johnie Monzon RN  Outcome: Progressing     Problem: Safety - Adult  Goal: Free from fall injury  12/3/2022 2336 by John Paul Patterson  Outcome: Progressing  12/3/2022 1133 by Johnie Monzon RN  Outcome: Progressing     Problem: ABCDS Injury Assessment  Goal: Absence of physical injury  12/3/2022 2336 by Army Cr  Outcome: Progressing  12/3/2022 1133 by Johnie Monzon RN  Outcome: Progressing     Problem: Discharge Planning  Goal: Discharge to home or other facility with appropriate resources  12/3/2022 2336 by Army Cr  Outcome: Progressing  12/3/2022 1133 by Johnie Monzon RN  Outcome: Progressing     Problem: Infection - Adult  Goal: Absence of infection at discharge  12/3/2022 2336 by Army Cr  Outcome: Progressing  12/3/2022 1133 by Johnie Monzon RN  Outcome: Progressing  Goal: Absence of infection during hospitalization  12/3/2022 2336 by Army Cr  Outcome: Progressing  12/3/2022 1133 by Johnie Monzon RN  Outcome: Progressing  Goal: Absence of fever/infection during anticipated neutropenic period  12/3/2022 2336 by Army Cr  Outcome: Progressing  12/3/2022 1133 by Johnie Monzon RN  Outcome: Progressing     Problem: Neurosensory - Adult  Goal: Achieves stable or improved neurological status  12/3/2022 2336 by Army Cr  Outcome: Progressing  12/3/2022 1133 by Johnie Monzon RN  Outcome: Progressing  Goal: Absence of seizures  12/3/2022 2336 by Army Cr  Outcome: Progressing  12/3/2022 1133 by Johnie Monzon RN  Outcome: Progressing  Goal: Remains free of injury related to seizures activity  12/3/2022 2336 by Kassi Ochoa  Outcome: Progressing  12/3/2022 1133 by Frank Landry RN  Outcome: Progressing  Goal: Achieves maximal functionality and self care  12/3/2022 2336 by Kassi Ochoa  Outcome: Progressing  12/3/2022 1133 by Frank Landry RN  Outcome: Progressing     Problem: Musculoskeletal - Adult  Goal: Return mobility to safest level of function  12/3/2022 2336 by John Paul Patterson  Outcome: Progressing  12/3/2022 1133 by Frank Landry RN  Outcome: Progressing  Goal: Maintain proper alignment of affected body part  12/3/2022 2336 by Kassi Ochoa  Outcome: Progressing  12/3/2022 1133 by Frank Landry RN  Outcome: Progressing  Goal: Return ADL status to a safe level of function  12/3/2022 2336 by John Paul Patterson  Outcome: Progressing  12/3/2022 1133 by Frank Landry RN  Outcome: Progressing     Problem: Gastrointestinal - Adult  Goal: Minimal or absence of nausea and vomiting  12/3/2022 2336 by Kassi Ochoa  Outcome: Progressing  12/3/2022 1133 by Frank Landry RN  Outcome: Progressing  Goal: Maintains or returns to baseline bowel function  12/3/2022 2336 by John Paul Patterson  Outcome: Progressing  12/3/2022 1133 by Frank Landry RN  Outcome: Progressing  Goal: Maintains adequate nutritional intake  12/3/2022 2336 by Kassi Ochoa  Outcome: Progressing  12/3/2022 1133 by Frank Landry RN  Outcome: Progressing  Goal: Establish and maintain optimal ostomy function  12/3/2022 2336 by Kassi Ochoa  Outcome: Progressing  12/3/2022 1133 by Frank Landry RN  Outcome: Progressing

## 2022-12-05 LAB
ABSOLUTE EOS #: 0.23 K/UL (ref 0–0.44)
ABSOLUTE IMMATURE GRANULOCYTE: <0.03 K/UL (ref 0–0.3)
ABSOLUTE LYMPH #: 1.2 K/UL (ref 1.1–3.7)
ABSOLUTE MONO #: 0.29 K/UL (ref 0.1–1.2)
ALBUMIN SERPL-MCNC: 3.5 G/DL (ref 3.5–5.2)
ALBUMIN/GLOBULIN RATIO: 1.3 (ref 1–2.5)
ALP BLD-CCNC: 101 U/L (ref 40–129)
ALT SERPL-CCNC: 10 U/L (ref 5–41)
ANION GAP SERPL CALCULATED.3IONS-SCNC: 10 MMOL/L (ref 9–17)
AST SERPL-CCNC: 25 U/L
BASOPHILS # BLD: 0 % (ref 0–2)
BASOPHILS ABSOLUTE: <0.03 K/UL (ref 0–0.2)
BILIRUB SERPL-MCNC: 0.5 MG/DL (ref 0.3–1.2)
BUN BLDV-MCNC: 12 MG/DL (ref 8–23)
C-REACTIVE PROTEIN: <3 MG/L (ref 0–5)
CALCIUM SERPL-MCNC: 9.1 MG/DL (ref 8.6–10.4)
CHLORIDE BLD-SCNC: 100 MMOL/L (ref 98–107)
CO2: 23 MMOL/L (ref 20–31)
CREAT SERPL-MCNC: 1.24 MG/DL (ref 0.7–1.2)
EOSINOPHILS RELATIVE PERCENT: 5 % (ref 1–4)
GFR SERPL CREATININE-BSD FRML MDRD: 58 ML/MIN/1.73M2
GLUCOSE BLD-MCNC: 119 MG/DL (ref 75–110)
GLUCOSE BLD-MCNC: 141 MG/DL (ref 70–99)
HCT VFR BLD CALC: 40.1 % (ref 40.7–50.3)
HEMOGLOBIN: 13.5 G/DL (ref 13–17)
IMMATURE GRANULOCYTES: 0 %
LIPASE: 22 U/L (ref 13–60)
LYMPHOCYTES # BLD: 25 % (ref 24–43)
MCH RBC QN AUTO: 31.8 PG (ref 25.2–33.5)
MCHC RBC AUTO-ENTMCNC: 33.7 G/DL (ref 28.4–34.8)
MCV RBC AUTO: 94.6 FL (ref 82.6–102.9)
MONOCYTES # BLD: 6 % (ref 3–12)
NRBC AUTOMATED: 0 PER 100 WBC
PDW BLD-RTO: 13.9 % (ref 11.8–14.4)
PLATELET # BLD: 173 K/UL (ref 138–453)
PMV BLD AUTO: 9.1 FL (ref 8.1–13.5)
POTASSIUM SERPL-SCNC: 4.1 MMOL/L (ref 3.7–5.3)
RBC # BLD: 4.24 M/UL (ref 4.21–5.77)
SEG NEUTROPHILS: 64 % (ref 36–65)
SEGMENTED NEUTROPHILS ABSOLUTE COUNT: 3.03 K/UL (ref 1.5–8.1)
SODIUM BLD-SCNC: 133 MMOL/L (ref 135–144)
TOTAL PROTEIN: 6.2 G/DL (ref 6.4–8.3)
WBC # BLD: 4.8 K/UL (ref 3.5–11.3)

## 2022-12-05 PROCEDURE — 85025 COMPLETE CBC W/AUTO DIFF WBC: CPT

## 2022-12-05 PROCEDURE — 83690 ASSAY OF LIPASE: CPT

## 2022-12-05 PROCEDURE — 86140 C-REACTIVE PROTEIN: CPT

## 2022-12-05 PROCEDURE — 6360000002 HC RX W HCPCS

## 2022-12-05 PROCEDURE — 1200000000 HC SEMI PRIVATE

## 2022-12-05 PROCEDURE — 6370000000 HC RX 637 (ALT 250 FOR IP)

## 2022-12-05 PROCEDURE — 82947 ASSAY GLUCOSE BLOOD QUANT: CPT

## 2022-12-05 PROCEDURE — 80053 COMPREHEN METABOLIC PANEL: CPT

## 2022-12-05 PROCEDURE — 36415 COLL VENOUS BLD VENIPUNCTURE: CPT

## 2022-12-05 PROCEDURE — 2580000003 HC RX 258

## 2022-12-05 RX ADMIN — CARBIDOPA AND LEVODOPA 1 TABLET: 25; 100 TABLET ORAL at 13:08

## 2022-12-05 RX ADMIN — ENTACAPONE 100 MG: 200 TABLET, FILM COATED ORAL at 17:33

## 2022-12-05 RX ADMIN — TAMSULOSIN HYDROCHLORIDE 0.4 MG: 0.4 CAPSULE ORAL at 09:29

## 2022-12-05 RX ADMIN — SODIUM CHLORIDE, PRESERVATIVE FREE 10 ML: 5 INJECTION INTRAVENOUS at 20:40

## 2022-12-05 RX ADMIN — AMITRIPTYLINE HYDROCHLORIDE 10 MG: 10 TABLET, FILM COATED ORAL at 20:39

## 2022-12-05 RX ADMIN — GABAPENTIN 100 MG: 100 CAPSULE ORAL at 09:26

## 2022-12-05 RX ADMIN — ENOXAPARIN SODIUM 40 MG: 100 INJECTION SUBCUTANEOUS at 09:27

## 2022-12-05 RX ADMIN — CARBIDOPA AND LEVODOPA 1 TABLET: 25; 100 TABLET ORAL at 20:39

## 2022-12-05 RX ADMIN — ACETAMINOPHEN 650 MG: 325 TABLET ORAL at 12:09

## 2022-12-05 RX ADMIN — CLOPIDOGREL 75 MG: 75 TABLET, FILM COATED ORAL at 09:26

## 2022-12-05 RX ADMIN — ROPINIROLE HYDROCHLORIDE 0.25 MG: 0.25 TABLET, FILM COATED ORAL at 20:39

## 2022-12-05 RX ADMIN — CARBIDOPA AND LEVODOPA 1 TABLET: 25; 100 TABLET ORAL at 09:26

## 2022-12-05 RX ADMIN — GABAPENTIN 100 MG: 100 CAPSULE ORAL at 20:39

## 2022-12-05 RX ADMIN — CARBIDOPA AND LEVODOPA 1 TABLET: 25; 100 TABLET ORAL at 17:33

## 2022-12-05 RX ADMIN — ENTACAPONE 100 MG: 200 TABLET, FILM COATED ORAL at 09:26

## 2022-12-05 RX ADMIN — ISOSORBIDE MONONITRATE 30 MG: 30 TABLET ORAL at 09:26

## 2022-12-05 RX ADMIN — SODIUM CHLORIDE, PRESERVATIVE FREE 10 ML: 5 INJECTION INTRAVENOUS at 09:29

## 2022-12-05 RX ADMIN — METOPROLOL TARTRATE 12.5 MG: 25 TABLET ORAL at 09:26

## 2022-12-05 RX ADMIN — ROPINIROLE HYDROCHLORIDE 0.25 MG: 0.25 TABLET, FILM COATED ORAL at 14:43

## 2022-12-05 RX ADMIN — ENTACAPONE 100 MG: 200 TABLET, FILM COATED ORAL at 13:08

## 2022-12-05 RX ADMIN — ENTACAPONE 100 MG: 200 TABLET, FILM COATED ORAL at 20:39

## 2022-12-05 RX ADMIN — DESMOPRESSIN ACETATE 40 MG: 0.2 TABLET ORAL at 09:26

## 2022-12-05 RX ADMIN — PANTOPRAZOLE SODIUM 40 MG: 40 TABLET, DELAYED RELEASE ORAL at 09:27

## 2022-12-05 RX ADMIN — GABAPENTIN 100 MG: 100 CAPSULE ORAL at 14:43

## 2022-12-05 RX ADMIN — ROPINIROLE HYDROCHLORIDE 0.25 MG: 0.25 TABLET, FILM COATED ORAL at 09:27

## 2022-12-05 ASSESSMENT — PAIN DESCRIPTION - ONSET: ONSET: ON-GOING

## 2022-12-05 ASSESSMENT — PAIN - FUNCTIONAL ASSESSMENT
PAIN_FUNCTIONAL_ASSESSMENT: ACTIVITIES ARE NOT PREVENTED
PAIN_FUNCTIONAL_ASSESSMENT: ACTIVITIES ARE NOT PREVENTED

## 2022-12-05 ASSESSMENT — PAIN DESCRIPTION - ORIENTATION
ORIENTATION: RIGHT;LEFT
ORIENTATION: RIGHT;LEFT

## 2022-12-05 ASSESSMENT — PAIN DESCRIPTION - PAIN TYPE: TYPE: ACUTE PAIN

## 2022-12-05 ASSESSMENT — PAIN DESCRIPTION - DESCRIPTORS
DESCRIPTORS: ACHING
DESCRIPTORS: SHARP

## 2022-12-05 ASSESSMENT — PAIN DESCRIPTION - LOCATION
LOCATION: HEAD
LOCATION: ABDOMEN

## 2022-12-05 ASSESSMENT — PAIN SCALES - GENERAL
PAINLEVEL_OUTOF10: 7
PAINLEVEL_OUTOF10: 6

## 2022-12-05 ASSESSMENT — PAIN DESCRIPTION - FREQUENCY: FREQUENCY: INTERMITTENT

## 2022-12-05 NOTE — PROGRESS NOTES
[address]  CDU / OBSERVATION ENCOUNTER  ATTENDING NOTE       I performed a history and physical examination of the patient and discussed management with the resident or midlevel provider. I reviewed the resident or midlevel provider's note and agree with the documented findings and plan of care. Any areas of disagreement are noted on the chart. I was personally present for the key portions of any procedures. I have documented in the chart those procedures where I was not present during the key portions. I have reviewed the nurses notes. I agree with the chief complaint, past medical history, past surgical history, allergies, medications, social and family history as documented unless otherwise noted below. The Family history, social history, and ROS are effectively unchanged since admission unless noted elsewhere in the chart. Patient with ongoing symptoms but improved. Patient had nausea vomiting. Patient has been waiting for precertification    Labs recheck today secondary to patient having some ongoing chronic complaints. Baseline. Creatinine elevated 1.24 but baseline. CRP less than 3. No white count.     Lukas Zapata MD  Attending Emergency  Physician

## 2022-12-05 NOTE — CARE COORDINATION
Transitional planning-called  Karthikeyan Perez and talked with Ken still pending. Needs PT notes. Called PT and talked with Marti Roa.

## 2022-12-05 NOTE — PLAN OF CARE
Problem: Chronic Conditions and Co-morbidities  Goal: Patient's chronic conditions and co-morbidity symptoms are monitored and maintained or improved  Outcome: Progressing     Problem: Pain  Goal: Verbalizes/displays adequate comfort level or baseline comfort level  12/5/2022 1229 by Mariangel Barlow RN  Outcome: Progressing  12/5/2022 0255 by James Lopez RN  Outcome: Progressing     Problem: Safety - Adult  Goal: Free from fall injury  Outcome: Progressing     Problem: ABCDS Injury Assessment  Goal: Absence of physical injury  Outcome: Progressing     Problem: Discharge Planning  Goal: Discharge to home or other facility with appropriate resources  Outcome: Progressing     Problem: Infection - Adult  Goal: Absence of infection at discharge  Outcome: Progressing  Goal: Absence of infection during hospitalization  Outcome: Progressing  Goal: Absence of fever/infection during anticipated neutropenic period  Outcome: Progressing     Problem: Neurosensory - Adult  Goal: Achieves stable or improved neurological status  Outcome: Progressing  Goal: Absence of seizures  Outcome: Progressing  Goal: Remains free of injury related to seizures activity  Outcome: Progressing  Goal: Achieves maximal functionality and self care  Outcome: Progressing     Problem: Musculoskeletal - Adult  Goal: Return mobility to safest level of function  Outcome: Progressing  Goal: Maintain proper alignment of affected body part  Outcome: Progressing  Goal: Return ADL status to a safe level of function  Outcome: Progressing     Problem: Gastrointestinal - Adult  Goal: Minimal or absence of nausea and vomiting  Outcome: Progressing  Goal: Maintains or returns to baseline bowel function  Outcome: Progressing  Goal: Maintains adequate nutritional intake  Outcome: Progressing  Goal: Establish and maintain optimal ostomy function  Outcome: Progressing     Problem: Skin/Tissue Integrity  Goal: Absence of new skin breakdown  Description: 1. Monitor for areas of redness and/or skin breakdown  2. Assess vascular access sites hourly  3. Every 4-6 hours minimum:  Change oxygen saturation probe site  4. Every 4-6 hours:  If on nasal continuous positive airway pressure, respiratory therapy assess nares and determine need for appliance change or resting period.   Outcome: Progressing

## 2022-12-05 NOTE — PROGRESS NOTES
OBS/CDU   RESIDENT NOTE      Patients PCP is:  Hector Mullins, APRN - [facility]        SUBJECTIVE      No acute events overnight. Patient states he is not feeling well this morning. He has been able to get up and ambulate to and from the bathroom. He reports continued nausea and vomiting however has been able to tolerate a full diet. The patient is urinating on his own and is passing flatus. Denies fever, chills, nausea, vomiting, chest pain, shortness of breath, abdominal pain, focal weakness, numbness, tingling, urinary/bowel symptoms, vision changes, visual hallucinations, or headache. PHYSICAL EXAM      General: NAD, AO X 3  Heent: EMOI, PERRL  Neck: SUPPLE, NO JVD  Cardiovascular: RRR, S1S2  Pulmonary: CTAB, NO SOB  Abdomen: SOFT, NTTP, ND, +BS  Extremities: +2/4 PULSES DISTAL, NO SWELLING  Neuro / Psych: NO NUMBNESS OR TINGLING, MENTATION AT BASELINE    PERTINENT TEST /EXAMS      I have reviewed all available laboratory results. MEDICATIONS CURRENT   sodium chloride flush 0.9 % injection 5-40 mL, 2 times per day  sodium chloride flush 0.9 % injection 5-40 mL, PRN  0.9 % sodium chloride infusion, PRN  enoxaparin (LOVENOX) injection 40 mg, Daily  acetaminophen (TYLENOL) tablet 650 mg, Q4H PRN  ondansetron (ZOFRAN-ODT) disintegrating tablet 4 mg, Q8H PRN   Or  ondansetron (ZOFRAN) injection 4 mg, Q6H PRN  amitriptyline (ELAVIL) tablet 10 mg, Nightly  atorvastatin (LIPITOR) tablet 40 mg, Daily  carbidopa-levodopa (SINEMET)  MG per tablet 1 tablet, 4x Daily  entacapone (COMTAN) tablet 100 mg, 4x Daily  gabapentin (NEURONTIN) capsule 100 mg, TID  isosorbide mononitrate (IMDUR) extended release tablet 30 mg, Daily  metoprolol tartrate (LOPRESSOR) tablet 12.5 mg, BID  pantoprazole (PROTONIX) tablet 40 mg, QAM AC  rOPINIRole (REQUIP) tablet 0.25 mg, TID  tamsulosin (FLOMAX) capsule 0.4 mg, Daily  clopidogrel (PLAVIX) tablet 75 mg, Daily      All medication charted and reviewed.     CONSULTS      IP CONSULT TO CARDIOLOGY  IP CONSULT TO GENERAL SURGERY  IP CONSULT TO HOME CARE NEEDS    ASSESSMENT/PLAN       Jessica Meyer is a 79 y.o. male who presents with weakness, fatigue, nausea, and vomiting     Nausea, vomiting, fatigue  Patient is approximately 1 month postop from [facility] with Dr. Ankit Roberts. General surgery was consulted for assessment. Per general surgery, no acute intervention required at this time. Continue symptomatic management. On assessment, patient continues to tolerated diet appropriately. Weakness  Patient seen and evaluated by both physical therapy and Occupational Therapy who note the patient would benefit from outpatient therapy. Patient initially planned for discharge with home care, however, he has since decided that he would prefer a SNF. Case management consulted to assess the patient for placement with SNF. Case management currently working on placement in a SNF. Patient has been able to tolerate a diet without difficulty. He does report persistent nausea. Patient stable for discharge when placement is available. Continue home medications and pain control  Monitor vitals, labs, and imaging  DISPO: pending placement    --  Jeyson De Jesus DO  Emergency Medicine Resident Physician     This dictation was generated by voice recognition computer software. Although all attempts are made to edit the dictation for accuracy, there may be errors in the transcription that are not intended.

## 2022-12-05 NOTE — PLAN OF CARE
Problem: Pain  Goal: Verbalizes/displays adequate comfort level or baseline comfort level  12/5/2022 0255 by Tesha Gonzáles RN  Outcome: Progressing

## 2022-12-06 PROCEDURE — 2580000003 HC RX 258

## 2022-12-06 PROCEDURE — 6370000000 HC RX 637 (ALT 250 FOR IP)

## 2022-12-06 PROCEDURE — 1200000000 HC SEMI PRIVATE

## 2022-12-06 PROCEDURE — 97116 GAIT TRAINING THERAPY: CPT

## 2022-12-06 PROCEDURE — 97110 THERAPEUTIC EXERCISES: CPT

## 2022-12-06 PROCEDURE — 97535 SELF CARE MNGMENT TRAINING: CPT

## 2022-12-06 PROCEDURE — 6360000002 HC RX W HCPCS

## 2022-12-06 RX ADMIN — ROPINIROLE HYDROCHLORIDE 0.25 MG: 0.25 TABLET, FILM COATED ORAL at 20:38

## 2022-12-06 RX ADMIN — ACETAMINOPHEN 650 MG: 325 TABLET ORAL at 20:40

## 2022-12-06 RX ADMIN — GABAPENTIN 100 MG: 100 CAPSULE ORAL at 09:50

## 2022-12-06 RX ADMIN — ISOSORBIDE MONONITRATE 30 MG: 30 TABLET ORAL at 09:50

## 2022-12-06 RX ADMIN — ENTACAPONE 100 MG: 200 TABLET, FILM COATED ORAL at 09:51

## 2022-12-06 RX ADMIN — PANTOPRAZOLE SODIUM 40 MG: 40 TABLET, DELAYED RELEASE ORAL at 06:03

## 2022-12-06 RX ADMIN — ENTACAPONE 100 MG: 200 TABLET, FILM COATED ORAL at 14:40

## 2022-12-06 RX ADMIN — GABAPENTIN 100 MG: 100 CAPSULE ORAL at 14:40

## 2022-12-06 RX ADMIN — ROPINIROLE HYDROCHLORIDE 0.25 MG: 0.25 TABLET, FILM COATED ORAL at 14:40

## 2022-12-06 RX ADMIN — SODIUM CHLORIDE, PRESERVATIVE FREE 10 ML: 5 INJECTION INTRAVENOUS at 09:51

## 2022-12-06 RX ADMIN — CARBIDOPA AND LEVODOPA 1 TABLET: 25; 100 TABLET ORAL at 14:40

## 2022-12-06 RX ADMIN — ROPINIROLE HYDROCHLORIDE 0.25 MG: 0.25 TABLET, FILM COATED ORAL at 09:50

## 2022-12-06 RX ADMIN — CLOPIDOGREL 75 MG: 75 TABLET, FILM COATED ORAL at 09:50

## 2022-12-06 RX ADMIN — CARBIDOPA AND LEVODOPA 1 TABLET: 25; 100 TABLET ORAL at 17:42

## 2022-12-06 RX ADMIN — METOPROLOL TARTRATE 12.5 MG: 25 TABLET ORAL at 20:38

## 2022-12-06 RX ADMIN — ONDANSETRON 4 MG: 2 INJECTION INTRAMUSCULAR; INTRAVENOUS at 09:49

## 2022-12-06 RX ADMIN — ENTACAPONE 100 MG: 200 TABLET, FILM COATED ORAL at 20:38

## 2022-12-06 RX ADMIN — TAMSULOSIN HYDROCHLORIDE 0.4 MG: 0.4 CAPSULE ORAL at 09:50

## 2022-12-06 RX ADMIN — AMITRIPTYLINE HYDROCHLORIDE 10 MG: 10 TABLET, FILM COATED ORAL at 20:39

## 2022-12-06 RX ADMIN — SODIUM CHLORIDE, PRESERVATIVE FREE 10 ML: 5 INJECTION INTRAVENOUS at 20:41

## 2022-12-06 RX ADMIN — CARBIDOPA AND LEVODOPA 1 TABLET: 25; 100 TABLET ORAL at 09:50

## 2022-12-06 RX ADMIN — GABAPENTIN 100 MG: 100 CAPSULE ORAL at 20:39

## 2022-12-06 RX ADMIN — CARBIDOPA AND LEVODOPA 1 TABLET: 25; 100 TABLET ORAL at 20:39

## 2022-12-06 RX ADMIN — ENTACAPONE 100 MG: 200 TABLET, FILM COATED ORAL at 17:42

## 2022-12-06 RX ADMIN — ENOXAPARIN SODIUM 40 MG: 100 INJECTION SUBCUTANEOUS at 09:50

## 2022-12-06 RX ADMIN — DESMOPRESSIN ACETATE 40 MG: 0.2 TABLET ORAL at 09:50

## 2022-12-06 ASSESSMENT — PAIN SCALES - GENERAL: PAINLEVEL_OUTOF10: 4

## 2022-12-06 ASSESSMENT — PAIN DESCRIPTION - LOCATION: LOCATION: ABDOMEN

## 2022-12-06 ASSESSMENT — PAIN DESCRIPTION - DESCRIPTORS: DESCRIPTORS: OTHER (COMMENT)

## 2022-12-06 NOTE — PROGRESS NOTES
OBS/CDU   RESIDENT NOTE      Patients PCP is:  JW Arita CNP        SUBJECTIVE      No acute events overnight. Patient states he was not feeling well this morning however he is feeling much better at this time. He states he improves with ginger ale. He has been able to get up and ambulate to and from the bathroom. He reports continued nausea and vomiting however has been able to tolerate a full diet. The patient is urinating on his own and is passing flatus. Denies fever, chills, nausea, vomiting, chest pain, shortness of breath, abdominal pain, focal weakness, numbness, tingling, urinary/bowel symptoms, vision changes, visual hallucinations, or headache. PHYSICAL EXAM      General: NAD, AO X 3  Heent: EMOI, PERRL  Neck: SUPPLE, NO JVD  Cardiovascular: RRR, S1S2  Pulmonary: CTAB, NO SOB  Abdomen: SOFT, NTTP, ND, +BS  Extremities: +2/4 PULSES DISTAL, NO SWELLING  Neuro / Psych: NO NUMBNESS OR TINGLING, MENTATION AT BASELINE    PERTINENT TEST /EXAMS      I have reviewed all available laboratory results.     MEDICATIONS CURRENT   sodium chloride flush 0.9 % injection 5-40 mL, 2 times per day  sodium chloride flush 0.9 % injection 5-40 mL, PRN  0.9 % sodium chloride infusion, PRN  enoxaparin (LOVENOX) injection 40 mg, Daily  acetaminophen (TYLENOL) tablet 650 mg, Q4H PRN  ondansetron (ZOFRAN-ODT) disintegrating tablet 4 mg, Q8H PRN   Or  ondansetron (ZOFRAN) injection 4 mg, Q6H PRN  amitriptyline (ELAVIL) tablet 10 mg, Nightly  atorvastatin (LIPITOR) tablet 40 mg, Daily  carbidopa-levodopa (SINEMET)  MG per tablet 1 tablet, 4x Daily  entacapone (COMTAN) tablet 100 mg, 4x Daily  gabapentin (NEURONTIN) capsule 100 mg, TID  isosorbide mononitrate (IMDUR) extended release tablet 30 mg, Daily  metoprolol tartrate (LOPRESSOR) tablet 12.5 mg, BID  pantoprazole (PROTONIX) tablet 40 mg, QAM AC  rOPINIRole (REQUIP) tablet 0.25 mg, TID  tamsulosin (FLOMAX) capsule 0.4 mg, Daily  clopidogrel (PLAVIX) tablet 75 mg, Daily      All medication charted and reviewed. CONSULTS      IP CONSULT TO CARDIOLOGY  IP CONSULT TO GENERAL SURGERY  IP CONSULT TO HOME CARE NEEDS    ASSESSMENT/PLAN       Ambar Oh is a 79 y.o. male who presents with weakness, fatigue, nausea, and vomiting     Nausea, vomiting, fatigue  Patient is approximately 1 month postop from [facility] with Dr. Lani Bustos. General surgery was consulted for assessment. Per general surgery, no acute intervention required at this time. Continue symptomatic management. On assessment, patient continues to tolerated diet appropriately. Repeat labs done yesterday as the patient has not had lab assessment in several days and complains of continued generalized feeling unwell, notable for elevated Cr which is at around the patient's baseline, otherwise labs within normal limits  Weakness  Patient seen and evaluated by both physical therapy and Occupational Therapy who note the patient would benefit from outpatient therapy. Patient initially planned for discharge with home care, however, he has since decided that he would prefer a SNF. Case management consulted to assess the patient for placement with SNF. Case management currently working on placement in a SNF -pre-CERT is pending at this time. Patient has been able to tolerate a diet without difficulty. He does report persistent nausea. Patient stable for discharge when placement is available. Continue home medications and pain control  Monitor vitals, labs, and imaging  DISPO: pending placement    --  Stevie Jones,   Emergency Medicine Resident Physician     This dictation was generated by voice recognition computer software. Although all attempts are made to edit the dictation for accuracy, there may be errors in the transcription that are not intended.

## 2022-12-06 NOTE — PLAN OF CARE
Problem: Chronic Conditions and Co-morbidities  Goal: Patient's chronic conditions and co-morbidity symptoms are monitored and maintained or improved  Outcome: Progressing     Problem: Pain  Goal: Verbalizes/displays adequate comfort level or baseline comfort level  Outcome: Progressing     Problem: Safety - Adult  Goal: Free from fall injury  Outcome: Progressing     Problem: ABCDS Injury Assessment  Goal: Absence of physical injury  Outcome: Progressing     Problem: Discharge Planning  Goal: Discharge to home or other facility with appropriate resources  Outcome: Progressing     Problem: Infection - Adult  Goal: Absence of infection at discharge  Outcome: Progressing  Goal: Absence of infection during hospitalization  Outcome: Progressing  Goal: Absence of fever/infection during anticipated neutropenic period  Outcome: Progressing     Problem: Neurosensory - Adult  Goal: Achieves stable or improved neurological status  Outcome: Progressing  Goal: Absence of seizures  Outcome: Progressing  Goal: Remains free of injury related to seizures activity  Outcome: Progressing  Goal: Achieves maximal functionality and self care  Outcome: Progressing     Problem: Musculoskeletal - Adult  Goal: Return mobility to safest level of function  Outcome: Progressing  Goal: Maintain proper alignment of affected body part  Outcome: Progressing  Goal: Return ADL status to a safe level of function  Outcome: Progressing     Problem: Gastrointestinal - Adult  Goal: Minimal or absence of nausea and vomiting  Outcome: Progressing  Goal: Maintains or returns to baseline bowel function  Outcome: Progressing  Goal: Maintains adequate nutritional intake  Outcome: Progressing  Goal: Establish and maintain optimal ostomy function  Outcome: Progressing     Problem: Skin/Tissue Integrity  Goal: Absence of new skin breakdown  Description: 1. Monitor for areas of redness and/or skin breakdown  2. Assess vascular access sites hourly  3.   Every 4-6 hours minimum:  Change oxygen saturation probe site  4. Every 4-6 hours:  If on nasal continuous positive airway pressure, respiratory therapy assess nares and determine need for appliance change or resting period.   Outcome: Progressing

## 2022-12-06 NOTE — PROGRESS NOTES
Writer spoke to pt daughter; daughter has concerns about pt receiving cortisone shot upon discharge, all questions questioned.

## 2022-12-06 NOTE — PROGRESS NOTES
Occupational Therapy  [facility] [address] [facility]  [facility] Daily Treatment Note    Name: Dianna Rios  : 1940  MRN: [id number]  Date of Service: 2022    Discharge Recommendations:  Patient would benefit from continued therapy after discharge    Patient Diagnosis(es): The encounter diagnosis was Generalized weakness. 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 Past Surgical History:  has a past surgical history that includes Colonoscopy (2012); shoulder surgery (Right); pr colsc flx w/rmvl of tumor polyp lesion snare tq (N/A, 2017); Colonoscopy (2017); Cholecystectomy, laparoscopic (10/29/2022); and Cholecystectomy, laparoscopic (N/A, 10/29/2022). Assessment   Performance deficits / Impairments: Decreased functional mobility ; Decreased ADL status; Decreased ROM; Decreased strength;Decreased balance;Decreased high-level IADLs;Decreased posture;Decreased coordination  Assessment: Pt would benefit from continued therapy to increase IND/safety with ADL's.   Prognosis: Good  REQUIRES OT FOLLOW-UP: Yes  Activity Tolerance  Activity Tolerance: Patient Tolerated treatment well;Patient limited by pain        Plan   Occupational Therapy Plan  Times Per Week: 2-4x/week  Current Treatment Recommendations: Balance training, Functional mobility training, Endurance training, Pain management, Safety education & training, Patient/Caregiver education & training, Equipment evaluation, education, & procurement, Self-Care / ADL     Restrictions  Restrictions/Precautions  Restrictions/Precautions: Up as Tolerated, General Precautions  Required Braces or Orthoses?: No    Subjective   General  Patient assessed for rehabilitation services?: Yes  Response to previous treatment: Patient with no complaints from previous session  Family / Caregiver Present: No  Subjective  Subjective: Pt reported 6/10 pain in his L LE. Pt able to continue with therapy session. Pt also reported that he had a stomach ache this morning, but that he was feeling better now. General Comment  Comments: RN okayed for therapy this AM. Pt agreeable to OT session. Pt was pleasant and cooperative throughout session. Upon arrival pt sitting in chair, Pt completed func mob from the EOB to chair w/ walker. Pt completed ADLs in chair, When session was complete pt retired to chair with chair alarm, call light within reach and RN notified. Objective   O2 Device: None (Room air)    Safety Devices  Type of Devices: Call light within reach; All fall risk precautions in place; Chair alarm in place; Left in chair;Patient at risk for falls  Restraints  Restraints Initially in Place: No  Bed Mobility Training  Bed Mobility Training: No  Balance  Sitting: Without support (Pt sat edge of chair while completing washing ADLs, ~5 mins, SUP)  Standing: With support (w/ walker, pt stood ~1 mins, CGA)  Gait  Overall Level of Assistance: Contact-guard assistance; Adaptive equipment (Pt completed func mob of household distances from EOB to chair. w/ RW CGA)  Speed/Ewa: Slow;Shuffled  Assistive Device: Gait belt;Walker, rollator    ADL  Grooming: Stand by assistance;Verbal cueing;Setup; Increased time to complete  Grooming Skilled Clinical Factors: Pt completed washing face and hands seated in chair. UE Bathing: Stand by assistance;Verbal cueing; Increased time to complete;Setup  UE Bathing Skilled Clinical Factors: Pt washed neck/chest/abdomen and UE seated in chair  UE Dressing: Stand by assistance;Setup; Increased time to complete;Verbal cueing  UE Dressing Skilled Clinical Factors: Pt doffed/don dirty then clean gown, threaded arms, assistance with tying gown    Transfers  Sit to stand: Moderate assistance (w/ RW)  Stand to sit: Moderate assistance (w/RW)  Transfer Comments: Pt completed transfer from EOB to chair, completed func mob w/ RW     Cognition  Overall Cognitive Status: Exceptions  Arousal/Alertness: Delayed responses to stimuli  Following Commands: Follows one step commands with increased time; Follows one step commands with repetition  Safety Judgement: Decreased awareness of need for assistance  Problem Solving: Assistance required to correct errors made;Assistance required to identify errors made  Insights: Decreased awareness of deficits  Initiation: Requires cues for some  Sequencing: Requires cues for some  Orientation  Overall Orientation Status: Within Normal Limits  Orientation Level: Oriented X4    Education Provided: Role of Therapy;Plan of Care;ADL Adaptive Strategies; Equipment;Transfer Training  Education Provided Comments: POC, walker management, proper hands/feet position for standing w/ walker, participation in therapy -good return  Education Method: Verbal;Demonstration  Barriers to Learning: Vision  Education Outcome: Continued education needed;Verbalized understanding    AM-PAC Score  AM-[facility] Inpatient Daily Activity Raw Score: 18 (12/06/22 1148)  AM-PAC Inpatient ADL T-Scale Score : 38.66 (12/06/22 1148)  ADL Inpatient CMS 0-100% Score: 46.65 (12/06/22 1148)  ADL Inpatient CMS G-Code Modifier : CK (12/06/22 1148)    Goals  Short Term Goals  Time Frame for Short Term Goals: By discharge  Short Term Goal 1: Pt will complete LB ADL's SBA with AE/DME/modified techniques as needed  Short Term Goal 2: Pt will complete UB ADL's Mod I with AE/DME/modified techniques as needed  Short Term Goal 3: Pt will complete functional mobility/transfers SBA with cane  Short Term Goal 4: Pt will demo Good safety throghout session without cuing  Short Term Goal 5: Pt will complete functional task while standing 5+ mins SBA with 1-2 UE support       Therapy Time   Individual Concurrent Group Co-treatment   Time In 1057         Time Out 1129         Minutes 32         Timed Code Treatment Minutes: 25 Minutes       MAVERICK Sloan/L

## 2022-12-06 NOTE — PROGRESS NOTES
Physical Therapy  Facility/Department: [facility]  Physical Therapy Daily Treatment Note    Name: Ambar Oh  : 1940  MRN: [id number]  Date of Service: 2022    Discharge Recommendations:  Patient would benefit from continued therapy after discharge   PT Equipment Recommendations  Equipment Needed: No      Patient Diagnosis(es): The encounter diagnosis was Generalized weakness. 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 Past Surgical History:  has a past surgical history that includes Colonoscopy (2012); shoulder surgery (Right); pr colsc flx w/rmvl of tumor polyp lesion snare tq (N/A, 2017); Colonoscopy (2017); Cholecystectomy, laparoscopic (10/29/2022); and Cholecystectomy, laparoscopic (N/A, 10/29/2022). Assessment   Body Structures, Functions, Activity Limitations Requiring Skilled Therapeutic Intervention: Decreased functional mobility ; Decreased body mechanics; Decreased endurance;Decreased high-level IADLs;Decreased balance;Decreased strength;Decreased ADL status; Decreased coordination  Assessment: Pt ambulated 25ft with SPC and CGA, unsteady with very small shuffled steps. Pt limited by decreased strength and endurance, also increased L knee pain with mobility. He is a fall risk, recommending continued PT to address deficits and maximize safety and independence with mobility.   Therapy Prognosis: Good  Requires PT Follow-Up: Yes  Activity Tolerance  Activity Tolerance: Patient limited by fatigue;Patient limited by endurance; Patient limited by pain  Activity Tolerance Comments: increased L knee pain with ambulation. Plan   Physcial Therapy Plan  General Plan:  (5-6x/week)  Current Treatment Recommendations: Strengthening, Functional mobility training, Endurance training, Stair training, Gait training, Safety education & training, Balance training  Safety Devices  Type of Devices: Call light within reach, All fall risk precautions in place, Chair alarm in place, Left in chair, Patient at risk for falls  Restraints  Restraints Initially in Place: No     Restrictions  Restrictions/Precautions  Restrictions/Precautions: Up as Tolerated, General Precautions  Required Braces or Orthoses?: No     Subjective   General  Chart Reviewed: Yes  Patient assessed for rehabilitation services?: Yes  Response To Previous Treatment: Patient with no complaints from previous session. Family / Caregiver Present: No  Follows Commands: Within Functional Limits  General Comment  Comments: Pt retired to chair at end of session set up to work with OT. Subjective  Subjective: Pt and RN agreeable to PT this morning. Pt supine in bed upon arrival with c/o 6/10 pain in L knee. Pt pleasant and cooperative throughout session. Cognition   Orientation  Overall Orientation Status: Within Normal Limits  Cognition  Overall Cognitive Status: Exceptions  Arousal/Alertness: Delayed responses to stimuli  Following Commands: Follows one step commands with increased time; Follows one step commands with repetition  Safety Judgement: Decreased awareness of need for assistance  Problem Solving: Assistance required to correct errors made;Assistance required to identify errors made  Insights: Decreased awareness of deficits  Initiation: Requires cues for some  Sequencing: Requires cues for some     Objective   Bed mobility  Supine to Sit: Contact guard assistance  Sit to Supine: Unable to assess (Pt retired to chair at end of session.)  Scooting: Contact guard assistance  Bed Mobility Comments: HOB elevated, utilized bed rails. Transfers  Sit to Stand: Contact guard assistance  Stand to Sit: Contact guard assistance  Comment: SPC used for transfers, increased time/effort to acheive upright posture. Ambulation  Surface: Level tile  Device: Single point cane  Assistance: Contact guard assistance;Minimal assistance  Quality of Gait: unsteady, forward flexed posture, small shuffled steps. Gait Deviations: Slow Ewa;Decreased step length;Decreased step height  Distance: 25ft  Comments: no true LOB, but pt displays mild insteadiness while ambulating with SPC. Pt with a forward flexed posture that increases with distance, also has very small shuffled steps. Pt ambulating a short distance in room, also grabbing onto items in room for stability. Recommending use of a RW for further distance. More Ambulation?: No  Stairs/Curb  Stairs?: No     Balance  Posture: Good  Sitting - Static: Good  Sitting - Dynamic: Good;-  Standing - Static: Fair  Standing - Dynamic: Fair  Comments: standing balance assessed with a SPC, pt able to sit EOB with SBA. Exercise Treatment:  Seated LE exercise program: Long Arc Quads, hip abduction/adduction, heel/toe raises, and marches. Reps: x10 BLE  Comments: Pt performed while seated in chair.       AM-PAC Score  AM-PAC Inpatient Mobility Raw Score : 18 (12/06/22 1203)  AM-PAC Inpatient T-Scale Score : 43.63 (12/06/22 1203)  Mobility Inpatient CMS 0-100% Score: 46.58 (12/06/22 1203)  Mobility Inpatient CMS G-Code Modifier : CK (12/06/22 1203)      Goals  Short Term Goals  Time Frame for Short Term Goals: 10 visits  Short Term Goal 1: transfers with SBA  Short Term Goal 2: amb 150 ft with a std cane x SBA  Short Term Goal 3: ascenbd/descend 4 steps with SBA  Short Term Goal 4: to be independent with bed mobility  Patient Goals   Patient Goals : Return home Education  Patient Education  Education Given To: Patient  Education Provided: Role of Therapy;Plan of Care;Home Exercise Program;Transfer Training  Education Method: Verbal  Barriers to Learning: None  Education Outcome: Verbalized understanding      Therapy Time   Individual Concurrent Group Co-treatment   Time In 1045         Time Out 1110         Minutes 25         Timed Code Treatment Minutes: 0094 María Messina PTA

## 2022-12-07 VITALS
WEIGHT: 177.47 LBS | TEMPERATURE: 97.7 F | HEART RATE: 64 BPM | RESPIRATION RATE: 20 BRPM | BODY MASS INDEX: 27.85 KG/M2 | HEIGHT: 67 IN | SYSTOLIC BLOOD PRESSURE: 98 MMHG | DIASTOLIC BLOOD PRESSURE: 64 MMHG | OXYGEN SATURATION: 97 %

## 2022-12-07 PROCEDURE — 6370000000 HC RX 637 (ALT 250 FOR IP)

## 2022-12-07 PROCEDURE — 2580000003 HC RX 258

## 2022-12-07 PROCEDURE — 6360000002 HC RX W HCPCS

## 2022-12-07 RX ADMIN — GABAPENTIN 100 MG: 100 CAPSULE ORAL at 10:18

## 2022-12-07 RX ADMIN — ONDANSETRON 4 MG: 4 TABLET, ORALLY DISINTEGRATING ORAL at 12:30

## 2022-12-07 RX ADMIN — CARBIDOPA AND LEVODOPA 1 TABLET: 25; 100 TABLET ORAL at 10:17

## 2022-12-07 RX ADMIN — DESMOPRESSIN ACETATE 40 MG: 0.2 TABLET ORAL at 10:18

## 2022-12-07 RX ADMIN — CARBIDOPA AND LEVODOPA 1 TABLET: 25; 100 TABLET ORAL at 14:44

## 2022-12-07 RX ADMIN — PANTOPRAZOLE SODIUM 40 MG: 40 TABLET, DELAYED RELEASE ORAL at 10:17

## 2022-12-07 RX ADMIN — ROPINIROLE HYDROCHLORIDE 0.25 MG: 0.25 TABLET, FILM COATED ORAL at 10:19

## 2022-12-07 RX ADMIN — ACETAMINOPHEN 650 MG: 325 TABLET ORAL at 10:20

## 2022-12-07 RX ADMIN — TAMSULOSIN HYDROCHLORIDE 0.4 MG: 0.4 CAPSULE ORAL at 10:19

## 2022-12-07 RX ADMIN — ISOSORBIDE MONONITRATE 30 MG: 30 TABLET ORAL at 10:18

## 2022-12-07 RX ADMIN — SODIUM CHLORIDE, PRESERVATIVE FREE 10 ML: 5 INJECTION INTRAVENOUS at 10:21

## 2022-12-07 RX ADMIN — ENTACAPONE 100 MG: 200 TABLET, FILM COATED ORAL at 10:18

## 2022-12-07 RX ADMIN — ENTACAPONE 100 MG: 200 TABLET, FILM COATED ORAL at 14:44

## 2022-12-07 RX ADMIN — ROPINIROLE HYDROCHLORIDE 0.25 MG: 0.25 TABLET, FILM COATED ORAL at 14:44

## 2022-12-07 RX ADMIN — METOPROLOL TARTRATE 12.5 MG: 25 TABLET ORAL at 10:19

## 2022-12-07 RX ADMIN — ENOXAPARIN SODIUM 40 MG: 100 INJECTION SUBCUTANEOUS at 10:19

## 2022-12-07 RX ADMIN — CLOPIDOGREL 75 MG: 75 TABLET, FILM COATED ORAL at 10:18

## 2022-12-07 ASSESSMENT — PAIN DESCRIPTION - LOCATION
LOCATION: HEAD
LOCATION: HEAD

## 2022-12-07 ASSESSMENT — PAIN SCALES - GENERAL
PAINLEVEL_OUTOF10: 1
PAINLEVEL_OUTOF10: 5

## 2022-12-07 NOTE — CARE COORDINATION
Discharge [facility] Case Management Department  Written by: Rufus Dumas RN    Patient Name: Jameel Alexis  Attending Provider: Rome Headley MD  Admit Date: 2022  2:33 PM  MRN: [id number]  Account: [de-identified]                     : 1940  Discharge Date: 22      Disposition: SNF    Met with patient to discuss transitional planning. Plan is [facility]. Per [facility] admissions, insurance has approved. Faxed request to [address] [place] [address] for 3pm wheelchair . HENS completed. Call placed to patient's son Emiliana Ford but daughter in law answered the phone and informed that patient will transfer to [facility] today. Patient and family agreeable to plan .      Rufus Dumas RN

## 2022-12-07 NOTE — CARE COORDINATION
YANELI and SAGE faxed to [facility]. Bedside nurse given phone number to call report and updated Dalila Duffy with [facility] of 3pm  time.

## 2022-12-07 NOTE — PROGRESS NOTES
[facility] [employer]  CDU / OBSERVATION ENCOUNTER  ATTENDING NOTE       I performed a history and physical examination of the patient and discussed management with the resident or midlevel provider. I reviewed the resident or midlevel provider's note and agree with the documented findings and plan of care. Any areas of disagreement are noted on the chart. I was personally present for the key portions of any procedures. I have documented in the chart those procedures where I was not present during the key portions. I have reviewed the nurses notes. I agree with the chief complaint, past medical history, past surgical history, allergies, medications, social and family history as documented unless otherwise noted below. The Family history, social history, and ROS are effectively unchanged since admission unless noted elsewhere in the chart. Symptoms improved today. Patient states he feels well and is eating appropriately. Patient awaiting ECF placement. Patient accepted but awaiting precertification.     Lyndon Sifuentes MD  Attending Emergency  Physician

## 2022-12-08 ENCOUNTER — HOSPITAL ENCOUNTER (OUTPATIENT)
Age: 82
Setting detail: SPECIMEN
Discharge: HOME | End: 2022-12-08

## 2022-12-08 LAB
ALBUMIN SERPL-MCNC: 3.7 G/DL (ref 3.5–5.2)
ALBUMIN/GLOBULIN RATIO: 1.2 (ref 1–2.5)
ALP BLD-CCNC: 103 U/L (ref 40–129)
ALT SERPL-CCNC: 10 U/L (ref 5–41)
ANION GAP SERPL CALCULATED.3IONS-SCNC: 12 MMOL/L (ref 9–17)
AST SERPL-CCNC: 25 U/L
BILIRUB SERPL-MCNC: 0.4 MG/DL (ref 0.3–1.2)
BUN BLDV-MCNC: 16 MG/DL (ref 8–23)
CALCIUM SERPL-MCNC: 9 MG/DL (ref 8.6–10.4)
CHLORIDE BLD-SCNC: 101 MMOL/L (ref 98–107)
CO2: 25 MMOL/L (ref 20–31)
CREAT SERPL-MCNC: 1.59 MG/DL (ref 0.7–1.2)
GFR SERPL CREATININE-BSD FRML MDRD: 43 ML/MIN/1.73M2
GLUCOSE BLD-MCNC: 81 MG/DL (ref 70–99)
HCT VFR BLD CALC: 41.6 % (ref 40.7–50.3)
HEMOGLOBIN: 13.8 G/DL (ref 13–17)
MCH RBC QN AUTO: 31.9 PG (ref 25.2–33.5)
MCHC RBC AUTO-ENTMCNC: 33.2 G/DL (ref 28.4–34.8)
MCV RBC AUTO: 96.3 FL (ref 82.6–102.9)
NRBC AUTOMATED: 0 PER 100 WBC
PDW BLD-RTO: 13.5 % (ref 11.8–14.4)
PLATELET # BLD: 192 K/UL (ref 138–453)
PMV BLD AUTO: 9.3 FL (ref 8.1–13.5)
POTASSIUM SERPL-SCNC: 4.4 MMOL/L (ref 3.7–5.3)
RBC # BLD: 4.32 M/UL (ref 4.21–5.77)
SODIUM BLD-SCNC: 138 MMOL/L (ref 135–144)
TOTAL PROTEIN: 6.7 G/DL (ref 6.4–8.3)
WBC # BLD: 4.8 K/UL (ref 3.5–11.3)

## 2022-12-08 PROCEDURE — P9603 ONE-WAY ALLOW PRORATED MILES: HCPCS

## 2022-12-08 PROCEDURE — 85027 COMPLETE CBC AUTOMATED: CPT

## 2022-12-08 PROCEDURE — 36415 COLL VENOUS BLD VENIPUNCTURE: CPT

## 2022-12-08 PROCEDURE — 80053 COMPREHEN METABOLIC PANEL: CPT

## 2022-12-09 NOTE — DISCHARGE SUMMARY
CDU Discharge Summary        Patient:  Sonam Lockhart  YOB: 1940    MRN: [id number]   Acct: [de-identified]    Primary Care Physician: JW Mckeon CNP    Admit date:  11/28/2022  2:33 PM  Discharge date: 12/7/2022  4:24 PM    Discharge Diagnoses:     Acute nausea and vomiting due to expected postoperative changes  Improved with antiemetics    Follow-up:  Call today/tomorrow for a follow up appointment with JW Mckeon CNP , or return to the Emergency Room with worsening symptoms    Stressed to patient the importance of following up with primary care doctor for further workup/management of symptoms. Pt verbalizes understanding and agrees with plan. Discharge Medications:  Changes to medications patient to be discharged to nursing facility with a prescription for Zofran to be taken as needed. Medication List        START taking these medications      ondansetron 4 MG tablet  Commonly known as: Zofran  Take 1 tablet by mouth every 8 hours as needed for Nausea            CONTINUE taking these medications      acetaminophen 650 MG extended release tablet  Commonly known as: Tylenol 8 Hour Arthritis Pain  Take 1 tablet by mouth every 8 hours as needed for Pain     allopurinol 100 MG tablet  Commonly known as: ZYLOPRIM  TAKE 1 TABLET BY MOUTH ONCE DAILY     amitriptyline 10 MG tablet  Commonly known as: ELAVIL  Take 1 tablet by mouth nightly     atorvastatin 40 MG tablet  Commonly known as: LIPITOR  take 1 tablet by mouth once daily     butalbital-acetaminophen-caffeine -40 MG per tablet  Commonly known as: FIORICET, ESGIC  Take 1 tab prn only for severe headache. Can repeat after 4 hrs if needed. Max 2 tabs/24 hrs.  Max 4 tabs/week     carbidopa-levodopa  MG per tablet  Commonly known as: SINEMET  TAKE ONE (1) TABLET BY MOUTH FOUR (4) TIMES DAILY     clopidogrel 75 MG tablet  Commonly known as: PLAVIX  TAKE 1 TABLET BY MOUTH ONCE DAILY     diclofenac sodium 1 % Gel  Commonly known as: VOLTAREN  Apply 2 g topically 2 times daily as needed for Pain (joint pain)     entacapone 200 MG tablet  Commonly known as: COMTAN  TAKE ONE (1) TABLET BY MOUTH FOUR (4) TIMES DAILY WITH SINEMET     gabapentin 100 MG capsule  Commonly known as: NEURONTIN  TAKE 1 CAPSULE BY MOUTH 3 TIMES DAILY FOR NERVES     ibuprofen 800 MG tablet  Commonly known as: ADVIL;MOTRIN  Take 1 tablet by mouth 2 times daily as needed for Pain     isosorbide mononitrate 30 MG extended release tablet  Commonly known as: IMDUR  TAKE 1 TABLET BY MOUTH ONCE DAILY FOR HYPERTENSION     magnesium oxide 400 (240 Mg) MG tablet  Commonly known as: MAG-OX  TAKE 1 TABLET BY MOUTH ONCE DAILY     metoprolol tartrate 25 MG tablet  Commonly known as: LOPRESSOR  TAKE ONE HALF (1/2) ATBLETS = 12.5MG BY MOUTH TWICE A DAY FOR HYPERTENSION     nitroGLYCERIN 0.4 MG SL tablet  Commonly known as: NITROSTAT  up to max of 3 total doses. If no relief after 1 dose, call 911. pantoprazole 40 MG tablet  Commonly known as: PROTONIX  TAKE 1 TABLET BY MOUTH ONCE DAILY FOR GERD     rOPINIRole 0.25 MG tablet  Commonly known as: REQUIP  TAKE ONE (1) TABLET BY MOUTH THREE (3) TIMES DAILY     tamsulosin 0.4 MG capsule  Commonly known as: FLOMAX  Take 1 capsule by mouth daily            ASK your doctor about these medications      promethazine 25 MG tablet  Commonly known as: PHENERGAN  Take 1 tablet by mouth 3 times daily as needed for Nausea  Ask about: Should I take this medication?                Where to Get Your Medications        These medications were sent to [facility] [address] [facility], [address], [address]  [address]      Phone: [phone]   ondansetron 4 MG tablet  promethazine 25 MG tablet         Diet:  No diet orders on file , Advance as tolerated     Activity:  As tolerated    Consultants: Grace NEEDS    Procedures:  Not indicated     Diagnostic Test:   Results for orders placed or performed during the hospital encounter of 11/28/22   Culture, Urine    Specimen: Urine, clean catch   Result Value Ref Range    Specimen Description . CLEAN CATCH URINE     Culture NO SIGNIFICANT GROWTH    Culture, Blood 1    Specimen: Blood   Result Value Ref Range    Specimen Description . BLOOD     Special Requests L FA 10ML     Culture NO GROWTH 5 DAYS    Culture, Blood 1    Specimen: Blood   Result Value Ref Range    Specimen Description . BLOOD     Special Requests L HAND 20 ML     Culture NO GROWTH 5 DAYS    COVID-19, Rapid    Specimen: Nasopharyngeal Swab   Result Value Ref Range    Specimen Description . NASOPHARYNGEAL SWAB     SARS-CoV-2, Rapid Not Detected Not Detected   Culture, Urine    Specimen: Urine, clean catch   Result Value Ref Range    Specimen Description . CLEAN CATCH URINE     Culture NO SIGNIFICANT GROWTH    CBC with Auto Differential   Result Value Ref Range    WBC 4.3 3.5 - 11.3 k/uL    RBC 4.38 4.21 - 5.77 m/uL    Hemoglobin 14.0 13.0 - 17.0 g/dL    Hematocrit 41.5 40.7 - 50.3 %    MCV 94.7 82.6 - 102.9 fL    MCH 32.0 25.2 - 33.5 pg    MCHC 33.7 28.4 - 34.8 g/dL    RDW 13.8 11.8 - 14.4 %    Platelets 981 398 - 279 k/uL    MPV 9.2 8.1 - 13.5 fL    NRBC Automated 0.0 0.0 per 100 WBC    Seg Neutrophils 51 36 - 65 %    Lymphocytes 30 24 - 43 %    Monocytes 15 (H) 3 - 12 %    Eosinophils % 2 1 - 4 %    Basophils 1 0 - 2 %    Immature Granulocytes 1 (H) 0 %    Segs Absolute 2.28 1.50 - 8.10 k/uL    Absolute Lymph # 1.29 1.10 - 3.70 k/uL    Absolute Mono # 0.64 0.10 - 1.20 k/uL    Absolute Eos # 0.07 0.00 - 0.44 k/uL    Basophils Absolute <0.03 0.00 - 0.20 k/uL    Absolute Immature Granulocyte <0.03 0.00 - 0.30 k/uL   Lactic Acid Now and in 2 Hours   Result Value Ref Range    Lactic Acid, Whole Blood 1.9 0.7 - 2.1 mmol/L   Lactic Acid Now and in 2 Hours   Result Value Ref Range    Lactic Acid, Whole Blood 1.7 0.7 - 2.1 mmol/L   TSH with Reflex   Result Value Ref Range    TSH 4.05 0.30 - 5.00 uIU/mL   Troponin   Result Value Ref Range    Troponin, High Sensitivity 22 0 - 22 ng/L   Urinalysis with Microscopic   Result Value Ref Range    Color, UA Yellow Yellow    Turbidity UA Clear Clear    Glucose, Ur NEGATIVE NEGATIVE    Bilirubin Urine NEGATIVE NEGATIVE    Ketones, Urine NEGATIVE NEGATIVE    Specific Gravity, UA 1.007 1.005 - 1.030    Urine Hgb NEGATIVE NEGATIVE    pH, UA 7.0 5.0 - 8.0    Protein, UA NEGATIVE NEGATIVE    Urobilinogen, Urine Normal Normal    Nitrite, Urine NEGATIVE NEGATIVE    Leukocyte Esterase, Urine NEGATIVE NEGATIVE    WBC, UA None 0 - 5 /HPF    RBC, UA 0 TO 2 0 - 4 /HPF    Epithelial Cells UA None 0 - 5 /HPF   Lipase   Result Value Ref Range    Lipase 20 13 - 60 U/L   Comprehensive Metabolic Panel   Result Value Ref Range    Glucose 100 (H) 70 - 99 mg/dL    BUN 13 8 - 23 mg/dL    Creatinine 1.16 0.70 - 1.20 mg/dL    Est, Glom Filt Rate >60 >60 mL/min/1.73m2    Calcium 8.7 8.6 - 10.4 mg/dL    Sodium 137 135 - 144 mmol/L    Potassium 3.9 3.7 - 5.3 mmol/L    Chloride 101 98 - 107 mmol/L    CO2 24 20 - 31 mmol/L    Anion Gap 12 9 - 17 mmol/L    Alkaline Phosphatase 113 40 - 129 U/L    ALT 57 (H) 5 - 41 U/L    AST 48 (H) <40 U/L    Total Bilirubin 0.5 0.3 - 1.2 mg/dL    Total Protein 7.2 6.4 - 8.3 g/dL    Albumin 4.1 3.5 - 5.2 g/dL    Albumin/Globulin Ratio 1.3 1.0 - 2.5   Urinalysis with Microscopic   Result Value Ref Range    Color, UA Yellow Yellow    Turbidity UA Clear Clear    Glucose, Ur NEGATIVE NEGATIVE    Bilirubin Urine NEGATIVE NEGATIVE    Ketones, Urine NEGATIVE NEGATIVE    Specific Gravity, UA 1.007 1.005 - 1.030    Urine Hgb NEGATIVE NEGATIVE    pH, UA 6.5 5.0 - 8.0    Protein, UA NEGATIVE NEGATIVE    Urobilinogen, Urine Normal Normal    Nitrite, Urine NEGATIVE NEGATIVE    Leukocyte Esterase, Urine NEGATIVE NEGATIVE    WBC, UA None 0 - 5 /HPF    RBC, UA None 0 - 4 /HPF    Epithelial Cells UA None 0 - 5 /HPF   CBC with Auto Differential   Result Value Ref Range    WBC 4.8 3.5 - 11.3 k/uL    RBC 4.24 4.21 - 5.77 m/uL    Hemoglobin 13.5 13.0 - 17.0 g/dL    Hematocrit 40.1 (L) 40.7 - 50.3 %    MCV 94.6 82.6 - 102.9 fL    MCH 31.8 25.2 - 33.5 pg    MCHC 33.7 28.4 - 34.8 g/dL    RDW 13.9 11.8 - 14.4 %    Platelets 221 381 - 068 k/uL    MPV 9.1 8.1 - 13.5 fL    NRBC Automated 0.0 0.0 per 100 WBC    Seg Neutrophils 64 36 - 65 %    Lymphocytes 25 24 - 43 %    Monocytes 6 3 - 12 %    Eosinophils % 5 (H) 1 - 4 %    Basophils 0 0 - 2 %    Immature Granulocytes 0 0 %    Segs Absolute 3.03 1.50 - 8.10 k/uL    Absolute Lymph # 1.20 1.10 - 3.70 k/uL    Absolute Mono # 0.29 0.10 - 1.20 k/uL    Absolute Eos # 0.23 0.00 - 0.44 k/uL    Basophils Absolute <0.03 0.00 - 0.20 k/uL    Absolute Immature Granulocyte <0.03 0.00 - 0.30 k/uL   C-Reactive Protein   Result Value Ref Range    CRP <3.0 0.0 - 5.0 mg/L   Comprehensive Metabolic Panel   Result Value Ref Range    Glucose 141 (H) 70 - 99 mg/dL    BUN 12 8 - 23 mg/dL    Creatinine 1.24 (H) 0.70 - 1.20 mg/dL    Est, Glom Filt Rate 58 (L) >60 mL/min/1.73m2    Calcium 9.1 8.6 - 10.4 mg/dL    Sodium 133 (L) 135 - 144 mmol/L    Potassium 4.1 3.7 - 5.3 mmol/L    Chloride 100 98 - 107 mmol/L    CO2 23 20 - 31 mmol/L    Anion Gap 10 9 - 17 mmol/L    Alkaline Phosphatase 101 40 - 129 U/L    ALT 10 5 - 41 U/L    AST 25 <40 U/L    Total Bilirubin 0.5 0.3 - 1.2 mg/dL    Total Protein 6.2 (L) 6.4 - 8.3 g/dL    Albumin 3.5 3.5 - 5.2 g/dL    Albumin/Globulin Ratio 1.3 1.0 - 2.5   Lipase   Result Value Ref Range    Lipase 22 13 - 60 U/L   POC Glucose Fingerstick   Result Value Ref Range    POC Glucose 96 75 - 110 mg/dL   POC Glucose Fingerstick   Result Value Ref Range    POC Glucose 119 (H) 75 - 110 mg/dL   EKG 12 Lead   Result Value Ref Range    Ventricular Rate 71 BPM    Atrial Rate 71 BPM    P-R Interval 178 ms    QRS Duration 110 ms    Q-T Interval 430 ms    QTc Calculation (Bazett) 467 ms    P Axis 63 degrees    R Axis 89 degrees    T Axis -27 degrees     CT ABDOMEN PELVIS WO CONTRAST Additional Contrast? None    Result Date: 11/29/2022  EXAMINATION: CT OF THE ABDOMEN AND PELVIS WITHOUT CONTRAST 11/29/2022 12:40 pm TECHNIQUE: CT of the abdomen and pelvis was performed without the administration of intravenous contrast. Multiplanar reformatted images are provided for review. Automated exposure control, iterative reconstruction, and/or weight based adjustment of the mA/kV was utilized to reduce the radiation dose to as low as reasonably achievable. COMPARISON: 10/25/2022 HISTORY: ORDERING SYSTEM PROVIDED HISTORY: s/p lap elissa n/V TECHNOLOGIST PROVIDED HISTORY: s/p lap elissa n/V Reason for Exam: s/p lap elissa n/v FINDINGS: Lower Chest: There is subsegmental atelectasis in the lung bases. 1 cm nodule in the right lower lobe is unchanged (previously 1.1 cm). Additional smaller noncalcified pulmonary nodules are also noted in the right lower lobe. Organs: Prior cholecystectomy. There is some ill-defined fluid in the resection bed, likely postoperative. There is a an ill-defined 3.1 x 3 x 2 cm fluid collection in the resection bed. Within the limitations of a noncontrast examination, no acute abnormality within the liver, spleen, pancreas, or adrenal glands. No nephrolithiasis or hydronephrosis. GI/Bowel: There is a small hiatal hernia. The stomach is mildly distended. The small bowel is nondilated. The appendix is normal in caliber. The colon is nondilated. Pelvis: Bladder is partially distended without vesicular stone. The prostate is mildly enlarged. Peritoneum/Retroperitoneum: No ascites or pneumoperitoneum. Atherosclerosis of the nondilated abdominal aorta. Bones/Soft Tissues: Multilevel degenerative changes in the lumbar spine. 1. Prior cholecystectomy. There is an ill-defined fluid collection in the resection bed.   This could be postoperative (resolving hematoma), but evaluation for super infection/abscess is limited without IV contrast. 2. Right lower lobe nodules measuring up to 1 cm are similar to prior CT. 12 month follow-up from the October 26, 2022 chest CT is again recommended. XR LUMBAR SPINE (2-3 VIEWS)    Result Date: 11/29/2022  EXAMINATION: THREE XRAY VIEWS OF THE LUMBAR SPINE 11/29/2022 11:53 am COMPARISON: 07/15/2019 HISTORY: ORDERING SYSTEM PROVIDED HISTORY: radicular pain TECHNOLOGIST PROVIDED HISTORY: radicular pain FINDINGS: The vertebral body heights are preserved. There is mild anterolisthesis at L4-L5, measuring 0.6 cm, which is unchanged. There is mild retrolisthesis at L1-L2 and L2-L3, measuring 0.4-0.5 cm, which is unchanged. There is severe multilevel disc space narrowing, endplate spurring, and facet arthropathy. 1.  No evidence of acute compression fracture or malalignment in the lumbar spine. 2.  Severe multilevel degenerative changes. XR CHEST PORTABLE    Result Date: 11/28/2022  EXAMINATION: ONE XRAY VIEW OF THE CHEST 11/28/2022 3:52 pm COMPARISON: 10/25/2022 HISTORY: ORDERING SYSTEM PROVIDED HISTORY: Altered Mental Status TECHNOLOGIST PROVIDED HISTORY: Altered Mental Status FINDINGS: Cardiomediastinal silhouette is normal in size. There is no pleural effusion or pneumothorax. There is no pulmonary consolidation. There is no acute osseous abnormality. No acute cardiopulmonary abnormality. Physical Exam:    General appearance - NAD, AOx 3   Lungs -CTAB, no R/R/R  Heart - RRR, no M/R/G  Abdomen - Soft, NT/ND  Neurological:  MAEx4, No focal motor deficit, sensory loss  Extremities - Cap refil <2 sec in all ext., no edema  Skin -warm, dry      Hospital Course:  Clinical course has improved, labs and imaging reviewed. Jonh Anderson originally presented to the hospital on 11/28/2022  2:33 PM. with nausea and vomiting. At that time it was determined that He required further observation and symptomatic control.  He was admitted and labs and imaging were followed daily. Imaging results as above. He is medically stable to be discharged. Disposition: [address]    Patient stated that they will not drive themselves home from the hospital if they have gotten pain killers/ narcotics earlier that day and that they will arrange for transportation on their own or work with the  for a ride. Patient counseled NOT to drive while under the influence of narcotics/ pain killers. Condition: Good    Patient stable and ready for discharge home. I have discussed plan of care with patient and they are in understanding. They were instructed to read discharge paperwork. All of their questions and concerns were addressed. Time Spent: 5 day      --  Vi Ramírez,   Emergency Medicine Resident Physician    This dictation was generated by voice recognition computer software. Although all attempts are made to edit the dictation for accuracy, there may be errors in the transcription that are not intended.

## 2022-12-10 NOTE — PROGRESS NOTES
[facility]  CDU / OBSERVATION eNCOUnter  Attending NOte       I performed a history and physical examination of the patient and discussed management with the resident. I reviewed the residents note and agree with the documented findings and plan of care. Any areas of disagreement are noted on the chart. I was personally present for the key portions of any procedures. I have documented in the chart those procedures where I was not present during the key portions. I have reviewed the nurses notes. I agree with the chief complaint, past medical history, past surgical history, allergies, medications, social and family history as documented unless otherwise noted below. The Family history, social history, and ROS are effectively unchanged since admission unless noted elsewhere in the chart. No changes in status overnight. Patient was able to tolerate oral intake without vomiting though he does describe having some nausea after eating. On physical exam, his abdomen is soft and nontender to palpation. Bowel sounds are normal.  Plan to reassess this afternoon after lunch. Discharge planning.     Leesa Carlton MD  Attending Emergency  Physician    Late note for 12/1/22

## 2022-12-15 NOTE — PROGRESS NOTES
OBS/CDU   Attending NOTE      Patients PCP is:  JW Lindsay - FELIPE        SUBJECTIVE      No new complaint today. Patient awaiting transfer to ECF. PHYSICAL EXAM      General: NAD, AO X 3  PERTINENT TEST /EXAMS      I have reviewed all available laboratory results. MEDICATIONS CURRENT   No current facility-administered medications for this encounter. All medication charted and reviewed. CONSULTS      IP CONSULT TO CARDIOLOGY  IP CONSULT TO GENERAL SURGERY  IP CONSULT TO HOME CARE NEEDS    ASSESSMENT/PLAN       Juan Carlos Mcgraw is a 79 y.o. male who presents with       Patient with improvement. Baseline. Patient had medications and plans made for discharge yesterday. Patient awaiting precertification. Patient has ride available. Patient taken to ECF this afternoon. No new complaint or changes  Continue home medications and pain control  Monitor vitals, labs, and imaging  DISPO: pending consults and clinical improvement    --  Bhavesh Conklin MD  Emergency Medicine Attending Physician     This dictation was generated by voice recognition computer software. Although all attempts are made to edit the dictation for accuracy, there may be errors in the transcription that are not intended.

## 2023-01-04 ENCOUNTER — CARE COORDINATION (OUTPATIENT)
Dept: CARE COORDINATION | Age: 83
End: 2023-01-04

## 2023-01-04 NOTE — CARE COORDINATION
Patient called ACM stating he is home from the SNF now. He thought he was having home health but no one has called, he has not scheduled a follow up with PCP. [facility] will ask office to call and get him on the schedule and ask Delfin JONES to assist in arranging his ride. [facility] will request a home health referral be placed at this visit. Patient is unsure which specialist he is to follow up with. AC will assist in scheduling appointments once PCP has determined his needs.

## 2023-01-04 NOTE — CARE COORDINATION
See if our pharmacy has it   Spoke to Dwayne Mejia at [facility]. precert is pending.  Insurance is delayed in reviewing d/t loss of power

## 2023-01-05 NOTE — TELEPHONE ENCOUNTER
Attempted to contact patient to [facility] appt.  Had to [facility] for patient to call office back

## 2023-01-06 ENCOUNTER — APPOINTMENT (OUTPATIENT)
Dept: GENERAL RADIOLOGY | Age: 83
DRG: 445 | End: 2023-01-06
Payer: MEDICARE

## 2023-01-06 ENCOUNTER — APPOINTMENT (OUTPATIENT)
Dept: CT IMAGING | Age: 83
DRG: 445 | End: 2023-01-06
Payer: MEDICARE

## 2023-01-06 ENCOUNTER — CARE COORDINATION (OUTPATIENT)
Dept: CARE COORDINATION | Age: 83
End: 2023-01-06

## 2023-01-06 ENCOUNTER — HOSPITAL ENCOUNTER (INPATIENT)
Age: 83
LOS: 5 days | Discharge: HOME | DRG: 445 | End: 2023-01-13
Attending: EMERGENCY MEDICINE | Admitting: EMERGENCY MEDICINE
Payer: MEDICARE

## 2023-01-06 DIAGNOSIS — R11.2 NAUSEA AND VOMITING, UNSPECIFIED VOMITING TYPE: Primary | ICD-10-CM

## 2023-01-06 LAB
ABO/RH: NORMAL
ABSOLUTE EOS #: 0.12 K/UL (ref 0–0.44)
ABSOLUTE IMMATURE GRANULOCYTE: <0.03 K/UL (ref 0–0.3)
ABSOLUTE LYMPH #: 1.68 K/UL (ref 1.1–3.7)
ABSOLUTE MONO #: 0.84 K/UL (ref 0.1–1.2)
ALBUMIN SERPL-MCNC: 4 G/DL (ref 3.5–5.2)
ALBUMIN/GLOBULIN RATIO: 1.2 (ref 1–2.5)
ALP BLD-CCNC: 124 U/L (ref 40–129)
ALT SERPL-CCNC: 28 U/L (ref 5–41)
ANION GAP SERPL CALCULATED.3IONS-SCNC: 8 MMOL/L (ref 9–17)
ANTIBODY SCREEN: NEGATIVE
ARM BAND NUMBER: NORMAL
AST SERPL-CCNC: 23 U/L
BASOPHILS # BLD: 0 % (ref 0–2)
BASOPHILS ABSOLUTE: <0.03 K/UL (ref 0–0.2)
BILIRUB SERPL-MCNC: 0.3 MG/DL (ref 0.3–1.2)
BUN BLDV-MCNC: 15 MG/DL (ref 8–23)
CALCIUM SERPL-MCNC: 9.3 MG/DL (ref 8.6–10.4)
CHLORIDE BLD-SCNC: 102 MMOL/L (ref 98–107)
CO2: 27 MMOL/L (ref 20–31)
CREAT SERPL-MCNC: 1.47 MG/DL (ref 0.7–1.2)
EOSINOPHILS RELATIVE PERCENT: 2 % (ref 1–4)
EXPIRATION DATE: NORMAL
GFR SERPL CREATININE-BSD FRML MDRD: 47 ML/MIN/1.73M2
GLUCOSE BLD-MCNC: 129 MG/DL (ref 70–99)
HCT VFR BLD CALC: 42.9 % (ref 40.7–50.3)
HEMOGLOBIN: 14.4 G/DL (ref 13–17)
IMMATURE GRANULOCYTES: 0 %
INR BLD: 1
LACTIC ACID, WHOLE BLOOD: 1.8 MMOL/L (ref 0.7–2.1)
LIPASE: 29 U/L (ref 13–60)
LYMPHOCYTES # BLD: 24 % (ref 24–43)
MCH RBC QN AUTO: 31.9 PG (ref 25.2–33.5)
MCHC RBC AUTO-ENTMCNC: 33.6 G/DL (ref 28.4–34.8)
MCV RBC AUTO: 95.1 FL (ref 82.6–102.9)
MONOCYTES # BLD: 12 % (ref 3–12)
NRBC AUTOMATED: 0 PER 100 WBC
PARTIAL THROMBOPLASTIN TIME: 24.4 SEC (ref 20.5–30.5)
PDW BLD-RTO: 14.1 % (ref 11.8–14.4)
PLATELET # BLD: 223 K/UL (ref 138–453)
PMV BLD AUTO: 9 FL (ref 8.1–13.5)
POTASSIUM SERPL-SCNC: 4.9 MMOL/L (ref 3.7–5.3)
PROTHROMBIN TIME: 10.7 SEC (ref 9.1–12.3)
RBC # BLD: 4.51 M/UL (ref 4.21–5.77)
SARS-COV-2, RAPID: NOT DETECTED
SEG NEUTROPHILS: 62 % (ref 36–65)
SEGMENTED NEUTROPHILS ABSOLUTE COUNT: 4.42 K/UL (ref 1.5–8.1)
SODIUM BLD-SCNC: 137 MMOL/L (ref 135–144)
SPECIMEN DESCRIPTION: NORMAL
TOTAL PROTEIN: 7.3 G/DL (ref 6.4–8.3)
TROPONIN, HIGH SENSITIVITY: 24 NG/L (ref 0–22)
TROPONIN, HIGH SENSITIVITY: 25 NG/L (ref 0–22)
WBC # BLD: 7.1 K/UL (ref 3.5–11.3)

## 2023-01-06 PROCEDURE — 85610 PROTHROMBIN TIME: CPT

## 2023-01-06 PROCEDURE — 86850 RBC ANTIBODY SCREEN: CPT

## 2023-01-06 PROCEDURE — A4216 STERILE WATER/SALINE, 10 ML: HCPCS

## 2023-01-06 PROCEDURE — 99285 EMERGENCY DEPT VISIT HI MDM: CPT

## 2023-01-06 PROCEDURE — 85025 COMPLETE CBC W/AUTO DIFF WBC: CPT

## 2023-01-06 PROCEDURE — C9113 INJ PANTOPRAZOLE SODIUM, VIA: HCPCS

## 2023-01-06 PROCEDURE — 6360000004 HC RX CONTRAST MEDICATION

## 2023-01-06 PROCEDURE — 86900 BLOOD TYPING SEROLOGIC ABO: CPT

## 2023-01-06 PROCEDURE — 2580000003 HC RX 258

## 2023-01-06 PROCEDURE — 87635 SARS-COV-2 COVID-19 AMP PRB: CPT

## 2023-01-06 PROCEDURE — 71045 X-RAY EXAM CHEST 1 VIEW: CPT

## 2023-01-06 PROCEDURE — 80053 COMPREHEN METABOLIC PANEL: CPT

## 2023-01-06 PROCEDURE — G0378 HOSPITAL OBSERVATION PER HR: HCPCS

## 2023-01-06 PROCEDURE — 93005 ELECTROCARDIOGRAM TRACING: CPT

## 2023-01-06 PROCEDURE — 6360000002 HC RX W HCPCS

## 2023-01-06 PROCEDURE — 96374 THER/PROPH/DIAG INJ IV PUSH: CPT

## 2023-01-06 PROCEDURE — 83690 ASSAY OF LIPASE: CPT

## 2023-01-06 PROCEDURE — 85730 THROMBOPLASTIN TIME PARTIAL: CPT

## 2023-01-06 PROCEDURE — 74177 CT ABD & PELVIS W/CONTRAST: CPT

## 2023-01-06 PROCEDURE — 86901 BLOOD TYPING SEROLOGIC RH(D): CPT

## 2023-01-06 PROCEDURE — 96361 HYDRATE IV INFUSION ADD-ON: CPT

## 2023-01-06 PROCEDURE — 96375 TX/PRO/DX INJ NEW DRUG ADDON: CPT

## 2023-01-06 PROCEDURE — 84484 ASSAY OF TROPONIN QUANT: CPT

## 2023-01-06 PROCEDURE — 83605 ASSAY OF LACTIC ACID: CPT

## 2023-01-06 RX ORDER — SODIUM CHLORIDE, SODIUM LACTATE, POTASSIUM CHLORIDE, AND CALCIUM CHLORIDE .6; .31; .03; .02 G/100ML; G/100ML; G/100ML; G/100ML
1000 INJECTION, SOLUTION INTRAVENOUS ONCE
Status: COMPLETED | OUTPATIENT
Start: 2023-01-06 | End: 2023-01-06

## 2023-01-06 RX ORDER — PROCHLORPERAZINE EDISYLATE 5 MG/ML
10 INJECTION INTRAMUSCULAR; INTRAVENOUS ONCE
Status: DISCONTINUED | OUTPATIENT
Start: 2023-01-06 | End: 2023-01-13 | Stop reason: HOSPADM

## 2023-01-06 RX ORDER — ONDANSETRON 2 MG/ML
4 INJECTION INTRAMUSCULAR; INTRAVENOUS ONCE
Status: COMPLETED | OUTPATIENT
Start: 2023-01-06 | End: 2023-01-06

## 2023-01-06 RX ORDER — ONDANSETRON 4 MG/1
4 TABLET, FILM COATED ORAL EVERY 8 HOURS PRN
Qty: 9 TABLET | Refills: 0 | Status: SHIPPED | OUTPATIENT
Start: 2023-01-06 | End: 2023-01-09

## 2023-01-06 RX ORDER — DIPHENHYDRAMINE HYDROCHLORIDE 50 MG/ML
25 INJECTION INTRAMUSCULAR; INTRAVENOUS ONCE
Status: DISCONTINUED | OUTPATIENT
Start: 2023-01-06 | End: 2023-01-13 | Stop reason: HOSPADM

## 2023-01-06 RX ADMIN — SODIUM CHLORIDE 40 MG: 9 INJECTION, SOLUTION INTRAMUSCULAR; INTRAVENOUS; SUBCUTANEOUS at 19:16

## 2023-01-06 RX ADMIN — SODIUM CHLORIDE, POTASSIUM CHLORIDE, SODIUM LACTATE AND CALCIUM CHLORIDE 1000 ML: 600; 310; 30; 20 INJECTION, SOLUTION INTRAVENOUS at 19:15

## 2023-01-06 RX ADMIN — ONDANSETRON 4 MG: 2 INJECTION INTRAMUSCULAR; INTRAVENOUS at 19:15

## 2023-01-06 RX ADMIN — IOPAMIDOL 75 ML: 755 INJECTION, SOLUTION INTRAVENOUS at 21:57

## 2023-01-06 ASSESSMENT — PAIN - FUNCTIONAL ASSESSMENT: PAIN_FUNCTIONAL_ASSESSMENT: 0-10

## 2023-01-06 ASSESSMENT — PAIN DESCRIPTION - LOCATION: LOCATION: ABDOMEN

## 2023-01-06 ASSESSMENT — PAIN SCALES - GENERAL: PAINLEVEL_OUTOF10: 6

## 2023-01-06 NOTE — TELEPHONE ENCOUNTER
Patient scheduled for 1/13@ Heike Piedra was notified of this and she will reach out to patient to make him aware of his appointment.

## 2023-01-06 NOTE — CARE COORDINATION
Ambulatory Care Coordination  ED Follow up Call    Reason for ED visit:  weakness, abdominal pain .  2 attempts to reach patient, no answer, no VM available.  ACM did have PCP schedule a follow up for next week and asked Delfin JONES to schedule transportation.

## 2023-01-06 NOTE — ED PROVIDER NOTES
[facility] ED  Emergency Department Encounter  Emergency Medicine Resident     Pt Name:Mina Delacruz  MRN: [id number]  [place] 1940  Date of evaluation: 1/6/23  PCP:  JW Greer CNP      CHIEF COMPLAINT       Chief Complaint   Patient presents with    Abdominal Pain    Emesis    Fatigue     Generalized weakness. Abdominal pain, vomiting    HISTORY OF PRESENT ILLNESS  (Location/Symptom, Timing/Onset, Context/Setting, Quality, Duration, Modifying Factors, Severity.)      Ambar Oh is a 79 y.o. male who presents with complaints of acute on chronic nausea, vomiting. Patient notes no vomiting yesterday but did have vomiting, 4-5 episodes today. Denied any blood in vomit. No diarrhea, hematochezia. Patient notes he has been able to tolerate p.o. today. Denies history of drinking alcohol. Notes he had cholecystectomy about a month ago. Denies any fever, chest pain, shortness of breath. On chart review, patient just seen at OSH ED yesterday for generalized weakness, abdominal pain, and chest pain and was discharged. Patient had gallbladder out October 2022. 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 Reviewed with patient     has a past surgical history that includes Colonoscopy (11/02/2012); shoulder surgery (Right); pr colsc flx w/rmvl of tumor polyp lesion snare tq (N/A, 09/19/2017);  Colonoscopy (09/19/2017); Cholecystectomy, laparoscopic (10/29/2022); and Cholecystectomy, laparoscopic (N/A, 10/29/2022). Reviewed with patient    Social History     Socioeconomic History    Marital status: Single     Spouse name: Not on file    Number of children: Not on file    Years of education: Not on file    Highest education level: Not on file   Occupational History    Not on file   Tobacco Use    Smoking status: Former    Smokeless tobacco: Never   Vaping Use    Vaping Use: Never used   Substance and Sexual Activity    Alcohol use: No    Drug use: No    Sexual activity: Not Currently   Other Topics Concern    Not on file   Social History Narrative    Not on file     Social Determinants of Health     Financial Resource Strain: Low Risk     Difficulty of Paying Living Expenses: Not hard at all   Food Insecurity: No Food Insecurity    Worried About Running Out of Food in the Last Year: Never true    [address] in the Last Year: Never true   Transportation Needs: No Transportation Needs    Lack of Transportation (Medical): No    Lack of Transportation (Non-Medical): No   Physical Activity: Inactive    Days of Exercise per Week: 0 days    Minutes of Exercise per Session: 0 min   Stress: Stress Concern Present    Feeling of Stress : To some extent   Social Connections: Socially Isolated    Frequency of Communication with Friends and Family: Three times a week    Frequency of Social Gatherings with Friends and Family:  Three times a week    Attends Oriental orthodox Services: Never    Active Member of Clubs or Organizations: No    Attends Club or Organization Meetings: Never    Marital Status:    Intimate Partner Violence: Not At Risk    Fear of Current or Ex-Partner: No    Emotionally Abused: No    Physically Abused: No    Sexually Abused: No   Housing Stability: Low Risk     Unable to Pay for Housing in the Last Year: No    Number of [place] in the Last Year: 1    Unstable Housing in the Last Year: No       Family History   Problem Relation Age of Onset    No Known Problems Mother     No Known Problems Father        Allergies:  Aspirin    Home Medications:  Prior to Admission medications    Medication Sig Start Date End Date Taking? Authorizing Provider   ondansetron (ZOFRAN) 4 MG tablet Take 1 tablet by mouth every 8 hours as needed for Nausea 1/6/23 1/9/23 Yes Emily Tompkins MD   ibuprofen (ADVIL;MOTRIN) 800 MG tablet Take 1 tablet by mouth 2 times daily as needed for Pain 11/1/22   Ramona Avendano MD   tamsulosin [facility]) 0.4 MG capsule Take 1 capsule by mouth daily 10/31/22   Grace Solano DO   gabapentin (NEURONTIN) 100 MG capsule TAKE 1 CAPSULE BY MOUTH 3 TIMES DAILY FOR NERVES 9/22/22 10/22/22  JW Devlin - CNP   pantoprazole (PROTONIX) 40 MG tablet TAKE 1 TABLET BY MOUTH ONCE DAILY FOR GERD 8/23/22   JW Devlin - CNP   isosorbide mononitrate (IMDUR) 30 MG extended release tablet TAKE 1 TABLET BY MOUTH ONCE DAILY FOR HYPERTENSION 8/23/22   JW Devlin - CNP   metoprolol tartrate (LOPRESSOR) 25 MG tablet TAKE ONE HALF (1/2) ATBLETS = 12.5MG BY MOUTH TWICE A DAY FOR HYPERTENSION 8/23/22   JW Devlin - CNP   carbidopa-levodopa (SINEMET)  MG per tablet TAKE ONE (1) TABLET BY MOUTH FOUR (4) TIMES DAILY 8/9/22   JW Devlin - CNP   rOPINIRole (REQUIP) 0.25 MG tablet TAKE ONE (1) TABLET BY MOUTH THREE (3) TIMES DAILY 8/8/22   JW Devlin - CNP   atorvastatin (LIPITOR) 40 MG tablet take 1 tablet by mouth once daily 8/4/22   JW Devlin - CNP   entacapone (COMTAN) 200 MG tablet TAKE ONE (1) TABLET BY MOUTH FOUR (4) TIMES DAILY WITH SINEMET 6/21/22   Phoebe Hanson MD   nitroGLYCERIN (NITROSTAT) 0.4 MG SL tablet up to max of 3 total doses.  If no relief after 1 dose, call 911. 6/9/22   Kaia Loredo APRN - NP   magnesium oxide (MAG-OX) 400 (240 Mg) MG tablet TAKE 1 TABLET BY MOUTH ONCE DAILY 5/23/22   JW Devlin CNP   diclofenac sodium (VOLTAREN) 1 % GEL Apply 2 g topically 2 times daily as needed for Pain (joint pain) 5/9/22   JW Nielsen NP   amitriptyline (ELAVIL) 10 MG tablet Take 1 tablet by mouth nightly 4/5/22   Marlene Calloway MD   clopidogrel (PLAVIX) 75 MG tablet TAKE 1 TABLET BY MOUTH ONCE DAILY  11/5/21   JW Gonzalez Junior, CNP   allopurinol (ZYLOPRIM) 100 MG tablet TAKE 1 TABLET BY MOUTH ONCE DAILY  11/5/21   JW Gonzalez Junior, CNP   acetaminophen (TYLENOL 8 HOUR ARTHRITIS PAIN) 650 MG extended release tablet Take 1 tablet by mouth every 8 hours as needed for Pain 6/14/21   JW Delacruz CNP   butalbital-acetaminophen-caffeine (FIORICET, ESGIC) -40 MG per tablet Take 1 tab prn only for severe headache. Can repeat after 4 hrs if needed. Max 2 tabs/24 hrs. Max 4 tabs/week 6/14/21   JW Delacruz CNP       REVIEW OF SYSTEMS    (2-9 systems for level 4, 10 or more for level 5)      Review of Systems   Constitutional:  Negative for chills and fever. HENT:  Negative for congestion and rhinorrhea. Eyes:  Negative for photophobia and visual disturbance. Respiratory:  Negative for shortness of breath and wheezing. Cardiovascular:  Negative for chest pain and palpitations. Gastrointestinal:  Negative for abdominal pain, nausea and vomiting. Genitourinary:  Negative for dysuria and frequency. Musculoskeletal:  Negative for back pain and neck pain. Skin:  Negative for rash and wound. Neurological:  Negative for dizziness and headaches. PHYSICAL EXAM   (up to 7 for level 4, 8 or more for level 5)      INITIAL VITALS:   BP (!) 136/93   Pulse 60   Temp 97.4 °F (36.3 °C) (Oral)   Resp 18   Ht 5' 7\" (1.702 m)   Wt 180 lb (81.6 kg)   SpO2 98%   BMI 28.19 kg/m²     Physical Exam  Vitals and nursing note reviewed. Constitutional:       General: He is not in acute distress. HENT:      Head: Atraumatic.       Right Ear: External ear normal.      Left Ear: External ear normal.      Nose: Nose normal. Mouth/Throat:      Mouth: Mucous membranes are dry. Pharynx: Oropharynx is clear. Eyes:      Conjunctiva/sclera: Conjunctivae normal.   Cardiovascular:      Rate and Rhythm: Normal rate and regular rhythm. Pulses: Normal pulses. Pulmonary:      Effort: Pulmonary effort is normal. No respiratory distress. Breath sounds: Normal breath sounds. No wheezing. Abdominal:      Palpations: Abdomen is soft. Tenderness: Mild epigastric tenderness, no guarding, rebound rigidity  Musculoskeletal:         General: Normal range of motion. Cervical back: Normal range of motion. Skin:     General: Skin is warm and dry. Capillary Refill: Capillary refill takes less than 2 seconds. Neurological:      General: No focal deficit present. BUE tremor     Mental Status: He is alert and oriented to person, place, and time. DIFFERENTIAL  DIAGNOSIS     PLAN (LABS / IMAGING / EKG):  Orders Placed This Encounter   Procedures    COVID-19, Rapid    XR CHEST PORTABLE    CT ABDOMEN PELVIS W IV CONTRAST Additional Contrast? None    CBC with Auto Differential    Comprehensive Metabolic Panel w/ Reflex to MG    Lipase    Protime-INR    APTT    Troponin    Troponin    Lactic Acid    Inpatient consult to Case Management    OT eval and treat    PT eval and treat    EKG 12 Lead    TYPE AND SCREEN    Place in Observation Service       MEDICATIONS ORDERED:  Orders Placed This Encounter   Medications    ondansetron (ZOFRAN) injection 4 mg    lactated ringers bolus    DISCONTD: pantoprazole (PROTONIX) 80 mg in sodium chloride 0.9 % 50 mL bolus    DISCONTD: pantoprazole (PROTONIX) 80 mg in sodium chloride 0.9 % 100 mL infusion    DISCONTD: cefTRIAXone (ROCEPHIN) 1,000 mg in sodium chloride 0.9 % 50 mL IVPB mini-bag     Order Specific Question:   Antimicrobial Indications     Answer:    Other     Order Specific Question:   Other Abx Indication     Answer:   upper gi bleeding    DISCONTD: pantoprazole (PROTONIX) 40 mg in sodium chloride 0.9 % 50 mL bolus    pantoprazole (PROTONIX) 40 mg in sodium chloride (PF) 0.9 % 10 mL injection    prochlorperazine (COMPAZINE) injection 10 mg    diphenhydrAMINE (BENADRYL) injection 25 mg    iopamidol (ISOVUE-370) 76 % injection 75 mL    ondansetron (ZOFRAN) 4 MG tablet     Sig: Take 1 tablet by mouth every 8 hours as needed for Nausea     Dispense:  9 tablet     Refill:  0       DDX: intraabdominal abscess, pancreatitis, dehydration, ACS, arrhythmia, viral illness    DIAGNOSTIC RESULTS / EMERGENCY DEPARTMENT COURSE / MDM   LAB RESULTS:  Results for orders placed or performed during the hospital encounter of 01/06/23   CBC with Auto Differential   Result Value Ref Range    WBC 7.1 3.5 - 11.3 k/uL    RBC 4.51 4.21 - 5.77 m/uL    Hemoglobin 14.4 13.0 - 17.0 g/dL    Hematocrit 42.9 40.7 - 50.3 %    MCV 95.1 82.6 - 102.9 fL    MCH 31.9 25.2 - 33.5 pg    MCHC 33.6 28.4 - 34.8 g/dL    RDW 14.1 11.8 - 14.4 %    Platelets 329 904 - 880 k/uL    MPV 9.0 8.1 - 13.5 fL    NRBC Automated 0.0 0.0 per 100 WBC    Seg Neutrophils 62 36 - 65 %    Lymphocytes 24 24 - 43 %    Monocytes 12 3 - 12 %    Eosinophils % 2 1 - 4 %    Basophils 0 0 - 2 %    Immature Granulocytes 0 0 %    Segs Absolute 4.42 1.50 - 8.10 k/uL    Absolute Lymph # 1.68 1.10 - 3.70 k/uL    Absolute Mono # 0.84 0.10 - 1.20 k/uL    Absolute Eos # 0.12 0.00 - 0.44 k/uL    Basophils Absolute <0.03 0.00 - 0.20 k/uL    Absolute Immature Granulocyte <0.03 0.00 - 0.30 k/uL   Comprehensive Metabolic Panel w/ Reflex to MG   Result Value Ref Range    Glucose 129 (H) 70 - 99 mg/dL    BUN 15 8 - 23 mg/dL    Creatinine 1.47 (H) 0.70 - 1.20 mg/dL    Est, Glom Filt Rate 47 (L) >60 mL/min/1.73m2    Calcium 9.3 8.6 - 10.4 mg/dL    Sodium 137 135 - 144 mmol/L    Potassium 4.9 3.7 - 5.3 mmol/L    Chloride 102 98 - 107 mmol/L    CO2 27 20 - 31 mmol/L    Anion Gap 8 (L) 9 - 17 mmol/L    Alkaline Phosphatase 124 40 - 129 U/L    ALT 28 5 - 41 U/L    AST 23 <40 U/L Total Bilirubin 0.3 0.3 - 1.2 mg/dL    Total Protein 7.3 6.4 - 8.3 g/dL    Albumin 4.0 3.5 - 5.2 g/dL    Albumin/Globulin Ratio 1.2 1.0 - 2.5   Lipase   Result Value Ref Range    Lipase 29 13 - 60 U/L   Protime-INR   Result Value Ref Range    Protime 10.7 9.1 - 12.3 sec    INR 1.0    APTT   Result Value Ref Range    PTT 24.4 20.5 - 30.5 sec   Troponin   Result Value Ref Range    Troponin, High Sensitivity 24 (H) 0 - 22 ng/L   Troponin   Result Value Ref Range    Troponin, High Sensitivity 25 (H) 0 - 22 ng/L   Lactic Acid   Result Value Ref Range    Lactic Acid, Whole Blood 1.8 0.7 - 2.1 mmol/L   TYPE AND SCREEN   Result Value Ref Range    Expiration Date 01/09/2023,2359     Arm Band Number [id number]     ABO/Rh O POSITIVE     Antibody Screen NEGATIVE        IMPRESSION: n/v, generalized weakness    RADIOLOGY:  CT ABDOMEN PELVIS W IV CONTRAST Additional Contrast? None   Final Result   Appendix is normal.  No obstructive uropathy. Status post cholecystectomy with interval decrease in fluid density within   the gallbladder fossa now measuring 1.7 x 2.6 cm in comparison with prior   examination when it measured 3.1 x 3 cm. Findings are likely suggestive of   postsurgical seroma/hematoma which is gradually resolving. Diverticulosis with no evidence of diverticulitis. Small axial hiatal hernia containing part of the stomach. 1 cm tubular structure within the right lower lobe which appears to be an   impacted bronchus, similar to prior CT examination of 06/27/2022. XR CHEST PORTABLE   Final Result   No radiographic evidence of an acute cardiopulmonary process. EKG  No ST or T wave changes, right bundle, sinus arrhythmia, similar to prior    All EKG's are interpreted by the Emergency Department Physician who either signs or Co-signs this chart in the absence of a cardiologist.    EMERGENCY DEPARTMENT COURSE:  82yoM presented to ED with complaints of epigastric pain, nausea, vomiting. Status postcholecystectomy 1 month ago. Also complains of generalized weakness. On exam, vomiting, afebrile, nontachycardic, epigastric tenderness noted. Lactate within normal limits. CT did show residual fluid collection status post cholecystectomy that has been demonstrated on prior imaging post cholecystectomy but fluid collection does appear smaller. Nausea improved status post Zofran but still has some nausea and does not feel safe with ADLs at home. Placed in observation unit to see case management, PT, OT, further treatment of nausea as needed. ED Course as of 01/06/23 2256 Fri Jan 06, 2023 1911 WBC: 7.1 [AR]   1926 Hemoglobin Quant: 14.4 [AR]   1926 Patient has received dye in June 2022 and nonspecific allergy noted with reaction of pain was entered in 2012. Plan to get CT abdomen pelvis with contrast to evaluate fluid collection noted on abdominal CT without contrast status postcholecystectomy [AR]   1929 2017 colonoscopy with multiple polyps, hx gerd dyspepsia per last Gi noted 2021 [AR]   1934 Consistent with right bundle branch block, sinus arrhythmia that is similar to prior [AR]   1942 Lipase: 29 [AR]   1942 Creatinine(!): 1.47 [AR]   1943 12/8/2022 creatinine 1.59 [AR]   1943 INR: 1.0 [AR]   1943 PTT: 24.4 [AR]   2106 Troponin, High Sensitivity(!): 25 [AR]   2107 11/28/22 trop 22 [AR]   2133 Nausea somewhat improved status post Zofran. Patient requesting food. Plan to get Compazine and Benadryl. Awaiting results of CT scan and second Trope. No chest pain. [AR]   2134 Lactic Acid, Whole Blood: 1.8 [AR]   2203 Troponin, High Sensitivity(!): 24 [AR]   2219 Placed in obs for n/v, currently improving s/p zofran, but also with difficulty with adl's and does not feel safe at home.  PT, OT, case management consults [AR]      ED Course User Index  [AR] Shiloh Kennedy MD     Medical Decision Making  Amount and/or Complexity of Data Reviewed  External Data Reviewed: labs and radiology. Labs: ordered. Decision-making details documented in ED Course. Radiology: ordered. ECG/medicine tests: ordered. Risk  Prescription drug management. Decision regarding hospitalization. No notes of EC Admission Criteria type on file. PROCEDURES:  None    CONSULTS:  IP CONSULT TO CASE MANAGEMENT    FINAL IMPRESSION      1.  Nausea and vomiting, unspecified vomiting type          DISPOSITION / PLAN     DISPOSITION Admitted 01/06/2023 10:53:01 PM      PATIENT REFERRED TO:  JW Bautista - CNP  [address] [facility]  [address] [facility],[address]  [address] [facility] [address]  [phone]    In 1 week      [facility]  [facility] [facility]  [phone]    As needed    Orestes Mcnulty, [address] [place] [address]  [address]  [phone]    In 1 week      DISCHARGE MEDICATIONS:  New Prescriptions    ONDANSETRON (ZOFRAN) 4 MG TABLET    Take 1 tablet by mouth every 8 hours as needed for Nausea       Rimma Elliott MD  Emergency Medicine Resident    (Please note that portions of thisnote were completed with a voice recognition program.  Efforts were made to edit the dictations but occasionally words are mis-transcribed.)         Loc Garcia MD  Resident  01/06/23 [id number]       Loc Garcia MD  Resident  01/06/23 [id number]

## 2023-01-06 NOTE — ED NOTES
Pt to ed with c/o abdominal pain, nausea, vomiting. Pt was evaluated and treated and d/c from [facility] yesterday without improvement. Pt states he has chronic abdominal pain for the past 5 years, states gallbladder was removed about a month ago. Pt denies chest pain, sob. Pt rates pain 5/10. Pt states pain is upper abdomen but radiates into the lower as well. Pt states he has been out of all of his home medications for several days.              Ferd Landau, RN  01/06/23 [id number]

## 2023-01-06 NOTE — CARE COORDINATION
Brook Sandoval/AMBER contacted the [facility] about arranging transportation for the patient to see  His PCP on 01/13/23 @ 11a. HC phoned and arranged for the patient to be transported by   [facility] to [facility]. His pickup time will be @ 10a. HC attempted to contact  the patient and found that his mailbox is full.     Plan of Care  [facility] will follow up with patient next week with the arrangements

## 2023-01-06 NOTE — ED NOTES
Pt placed on cardiac monitor, BP cuff, and pulse ox. Alarms set.       Phil Bridges RN  01/06/23 [id number]

## 2023-01-07 ENCOUNTER — APPOINTMENT (OUTPATIENT)
Dept: CT IMAGING | Age: 83
DRG: 445 | End: 2023-01-07
Payer: MEDICARE

## 2023-01-07 PROCEDURE — G0378 HOSPITAL OBSERVATION PER HR: HCPCS

## 2023-01-07 PROCEDURE — 6370000000 HC RX 637 (ALT 250 FOR IP)

## 2023-01-07 PROCEDURE — 96376 TX/PRO/DX INJ SAME DRUG ADON: CPT

## 2023-01-07 PROCEDURE — 70450 CT HEAD/BRAIN W/O DYE: CPT

## 2023-01-07 PROCEDURE — 2580000003 HC RX 258

## 2023-01-07 PROCEDURE — 96361 HYDRATE IV INFUSION ADD-ON: CPT

## 2023-01-07 PROCEDURE — C9113 INJ PANTOPRAZOLE SODIUM, VIA: HCPCS | Performed by: EMERGENCY MEDICINE

## 2023-01-07 PROCEDURE — 6360000002 HC RX W HCPCS

## 2023-01-07 PROCEDURE — 6360000002 HC RX W HCPCS: Performed by: EMERGENCY MEDICINE

## 2023-01-07 PROCEDURE — 96372 THER/PROPH/DIAG INJ SC/IM: CPT

## 2023-01-07 PROCEDURE — 2580000003 HC RX 258: Performed by: EMERGENCY MEDICINE

## 2023-01-07 RX ORDER — TAMSULOSIN HYDROCHLORIDE 0.4 MG/1
0.4 CAPSULE ORAL DAILY
Status: DISCONTINUED | OUTPATIENT
Start: 2023-01-07 | End: 2023-01-13 | Stop reason: HOSPADM

## 2023-01-07 RX ORDER — SODIUM CHLORIDE 0.9 % (FLUSH) 0.9 %
5-40 SYRINGE (ML) INJECTION PRN
Status: DISCONTINUED | OUTPATIENT
Start: 2023-01-07 | End: 2023-01-13 | Stop reason: HOSPADM

## 2023-01-07 RX ORDER — ENTACAPONE 200 MG/1
200 TABLET ORAL 4 TIMES DAILY
Status: DISCONTINUED | OUTPATIENT
Start: 2023-01-07 | End: 2023-01-13 | Stop reason: HOSPADM

## 2023-01-07 RX ORDER — ENOXAPARIN SODIUM 100 MG/ML
40 INJECTION SUBCUTANEOUS DAILY
Status: DISCONTINUED | OUTPATIENT
Start: 2023-01-07 | End: 2023-01-13 | Stop reason: HOSPADM

## 2023-01-07 RX ORDER — LANOLIN ALCOHOL/MO/W.PET/CERES
400 CREAM (GRAM) TOPICAL DAILY
Status: DISCONTINUED | OUTPATIENT
Start: 2023-01-07 | End: 2023-01-13 | Stop reason: HOSPADM

## 2023-01-07 RX ORDER — SODIUM CHLORIDE 9 MG/ML
1000 INJECTION, SOLUTION INTRAVENOUS CONTINUOUS
Status: ACTIVE | OUTPATIENT
Start: 2023-01-07 | End: 2023-01-08

## 2023-01-07 RX ORDER — ACETAMINOPHEN 325 MG/1
650 TABLET ORAL EVERY 4 HOURS PRN
Status: DISCONTINUED | OUTPATIENT
Start: 2023-01-07 | End: 2023-01-09

## 2023-01-07 RX ORDER — ONDANSETRON 2 MG/ML
4 INJECTION INTRAMUSCULAR; INTRAVENOUS EVERY 6 HOURS PRN
Status: DISCONTINUED | OUTPATIENT
Start: 2023-01-07 | End: 2023-01-13 | Stop reason: HOSPADM

## 2023-01-07 RX ORDER — PANTOPRAZOLE SODIUM 40 MG/1
40 TABLET, DELAYED RELEASE ORAL
Status: DISCONTINUED | OUTPATIENT
Start: 2023-01-07 | End: 2023-01-07

## 2023-01-07 RX ORDER — SODIUM CHLORIDE 0.9 % (FLUSH) 0.9 %
5-40 SYRINGE (ML) INJECTION EVERY 12 HOURS SCHEDULED
Status: DISCONTINUED | OUTPATIENT
Start: 2023-01-07 | End: 2023-01-13 | Stop reason: HOSPADM

## 2023-01-07 RX ORDER — ONDANSETRON 4 MG/1
4 TABLET, ORALLY DISINTEGRATING ORAL EVERY 8 HOURS PRN
Status: DISCONTINUED | OUTPATIENT
Start: 2023-01-07 | End: 2023-01-13 | Stop reason: HOSPADM

## 2023-01-07 RX ORDER — ISOSORBIDE MONONITRATE 30 MG/1
30 TABLET, EXTENDED RELEASE ORAL DAILY
Status: DISCONTINUED | OUTPATIENT
Start: 2023-01-07 | End: 2023-01-13 | Stop reason: HOSPADM

## 2023-01-07 RX ORDER — ALLOPURINOL 100 MG/1
100 TABLET ORAL DAILY
Status: DISCONTINUED | OUTPATIENT
Start: 2023-01-07 | End: 2023-01-13 | Stop reason: HOSPADM

## 2023-01-07 RX ORDER — ROPINIROLE 0.25 MG/1
0.25 TABLET, FILM COATED ORAL 3 TIMES DAILY
Status: DISCONTINUED | OUTPATIENT
Start: 2023-01-07 | End: 2023-01-13 | Stop reason: HOSPADM

## 2023-01-07 RX ORDER — CLOPIDOGREL BISULFATE 75 MG/1
75 TABLET ORAL DAILY
Status: DISCONTINUED | OUTPATIENT
Start: 2023-01-07 | End: 2023-01-13 | Stop reason: HOSPADM

## 2023-01-07 RX ORDER — AMITRIPTYLINE HYDROCHLORIDE 10 MG/1
10 TABLET, FILM COATED ORAL NIGHTLY
Status: DISCONTINUED | OUTPATIENT
Start: 2023-01-07 | End: 2023-01-13 | Stop reason: HOSPADM

## 2023-01-07 RX ORDER — ATORVASTATIN CALCIUM 40 MG/1
40 TABLET, FILM COATED ORAL NIGHTLY
Status: DISCONTINUED | OUTPATIENT
Start: 2023-01-07 | End: 2023-01-13 | Stop reason: HOSPADM

## 2023-01-07 RX ORDER — SODIUM CHLORIDE 9 MG/ML
INJECTION, SOLUTION INTRAVENOUS PRN
Status: DISCONTINUED | OUTPATIENT
Start: 2023-01-07 | End: 2023-01-09

## 2023-01-07 RX ADMIN — ENTACAPONE 200 MG: 200 TABLET, FILM COATED ORAL at 20:44

## 2023-01-07 RX ADMIN — ONDANSETRON 4 MG: 2 INJECTION INTRAMUSCULAR; INTRAVENOUS at 16:36

## 2023-01-07 RX ADMIN — AMITRIPTYLINE HYDROCHLORIDE 10 MG: 10 TABLET, FILM COATED ORAL at 20:44

## 2023-01-07 RX ADMIN — CARBIDOPA AND LEVODOPA 1 TABLET: 25; 100 TABLET ORAL at 20:44

## 2023-01-07 RX ADMIN — ENTACAPONE 200 MG: 200 TABLET, FILM COATED ORAL at 08:59

## 2023-01-07 RX ADMIN — MAGNESIUM GLUCONATE 500 MG ORAL TABLET 400 MG: 500 TABLET ORAL at 08:58

## 2023-01-07 RX ADMIN — ENTACAPONE 200 MG: 200 TABLET, FILM COATED ORAL at 13:15

## 2023-01-07 RX ADMIN — TAMSULOSIN HYDROCHLORIDE 0.4 MG: 0.4 CAPSULE ORAL at 08:58

## 2023-01-07 RX ADMIN — SODIUM CHLORIDE, PRESERVATIVE FREE 10 ML: 5 INJECTION INTRAVENOUS at 08:59

## 2023-01-07 RX ADMIN — ALLOPURINOL 100 MG: 100 TABLET ORAL at 08:59

## 2023-01-07 RX ADMIN — ENOXAPARIN SODIUM 40 MG: 100 INJECTION SUBCUTANEOUS at 08:59

## 2023-01-07 RX ADMIN — ONDANSETRON 4 MG: 2 INJECTION INTRAMUSCULAR; INTRAVENOUS at 09:19

## 2023-01-07 RX ADMIN — ROPINIROLE HYDROCHLORIDE 0.25 MG: 0.25 TABLET, FILM COATED ORAL at 20:44

## 2023-01-07 RX ADMIN — SODIUM CHLORIDE 1000 ML: 9 INJECTION, SOLUTION INTRAVENOUS at 13:17

## 2023-01-07 RX ADMIN — PANTOPRAZOLE SODIUM 40 MG: 40 TABLET, DELAYED RELEASE ORAL at 08:59

## 2023-01-07 RX ADMIN — ROPINIROLE HYDROCHLORIDE 0.25 MG: 0.25 TABLET, FILM COATED ORAL at 08:58

## 2023-01-07 RX ADMIN — CARBIDOPA AND LEVODOPA 1 TABLET: 25; 100 TABLET ORAL at 08:58

## 2023-01-07 RX ADMIN — DESMOPRESSIN ACETATE 40 MG: 0.2 TABLET ORAL at 20:44

## 2023-01-07 RX ADMIN — ROPINIROLE HYDROCHLORIDE 0.25 MG: 0.25 TABLET, FILM COATED ORAL at 14:55

## 2023-01-07 RX ADMIN — CLOPIDOGREL 75 MG: 75 TABLET, FILM COATED ORAL at 08:59

## 2023-01-07 RX ADMIN — SODIUM CHLORIDE, PRESERVATIVE FREE 20 MG: 5 INJECTION INTRAVENOUS at 16:31

## 2023-01-07 RX ADMIN — ENTACAPONE 200 MG: 200 TABLET, FILM COATED ORAL at 16:33

## 2023-01-07 RX ADMIN — CARBIDOPA AND LEVODOPA 1 TABLET: 25; 100 TABLET ORAL at 16:33

## 2023-01-07 RX ADMIN — ISOSORBIDE MONONITRATE 30 MG: 30 TABLET ORAL at 08:58

## 2023-01-07 RX ADMIN — CARBIDOPA AND LEVODOPA 1 TABLET: 25; 100 TABLET ORAL at 13:15

## 2023-01-07 NOTE — ED PROVIDER NOTES
[facility]     Emergency Department     Faculty Attestation    I performed a history and physical examination of the patient and discussed management with the resident. I have reviewed and agree with the residents findings including all diagnostic interpretations, and treatment plans as written. Any areas of disagreement are noted on the chart. I was personally present for the key portions of any procedures. I have documented in the chart those procedures where I was not present during the key portions. I have reviewed the emergency nurses triage note. I agree with the chief complaint, past medical history, past surgical history, allergies, medications, social and family history as documented unless otherwise noted below. Documentation of the HPI, Physical Exam and Medical Decision Making performed by scribpaige is based on my personal performance of the HPI, PE and MDM. For Physician Assistant/ Nurse Practitioner cases/documentation I have personally evaluated this patient and have completed at least one if not all key elements of the E/M (history, physical exam, and MDM). Additional findings are as noted. Patient with nausea and vomiting for multiple days. States he has a history of this. Status postcholecystectomy and October. Recent hospital admission, and stayed at a nursing facility until about 1 week ago. Was in ER yesterday due to chest pain. Does state that he was vomiting at that time but chest pain has since resolved. History of NSTEMI in June 2022. Multiple episodes of vomiting throughout the day no diarrhea. Does complain of some upper abdominal pain. On arrival to the emergency room we did have some coffee-ground emesis. Patient's chart does show that he is on Plavix 75 mg daily. He has been unable to hold down any of his medications for the past many days. He does have a history of parkinsonism.   Patient lives by himself at home.    Patient on exam has baseline tremoring in his bilateral upper extremities. He is answering questions appropriately awake alert. Afebrile. Tender abdomen in the epigastric region but nonperitoneal, no rebound or guarding. Patient moving all extremities equally. Patient with nausea and vomiting. Status post cholecystectomy in the past.  Last CT was reviewed in November 2022. Also ER visit from yesterday was reviewed. We will plan on repeat labs, repeat CT his chart does show he has a dye allergy however patient denies this. He has had previous CTs with contrast in the past without any complications. Patient does have a cardiac cath done in June 2022 that shows single-vessel CAD patent stent in the ostial LAD nonobstructive CAD. Patient's EKG today does show sinus rhythm with a sinus arrhythmia. Ventricular rate of 62, incomplete right bundle branch block which has been present on previous EKG,    Previous Ct shows post op fluid collection, repeat ct today, check labs, Give home meds, Antiemetics, prontonix, coffee ground likely more from perry dang tear then upper Gi bleed. Check h/h. Lactic  IV fluids.    Carrol Yang D.O, M.P.H  Attending Emergency Medicine Physician         Carrol Yang,   01/06/23 Taj Bourgeois

## 2023-01-07 NOTE — CARE COORDINATION
Pt states he is to weak to go home alone and wants SNF placement for strengthening. Freedom of Choice was offered and eclined by pt, states he has been to [facility] in the past and would like to return there.  Referral is placed

## 2023-01-07 NOTE — PROGRESS NOTES
[address] [facility] [address]  PROGRESS NOTE    Shift date: 1/6/23  Shift day: Friday   Shift # 2    Room # 29/29   Name: Rylie Malone                Rastafari: Neris Guerrero 33 of Gnosticism: [facility]    Referral: Routine Visit    Admit Date & Time: 1/6/2023  6:42 PM    Assessment:  Rylie Malone is a 79 y.o. male       Intervention:  Writer introduced self and title as . Patient appeared receptive to  visit and engaged in conversation about his health and its impact. Writer offered space for patient  to express feelings, needs, and concerns and provided a ministry presence. Patient stated he is of [address] and belongs to [address] [place] [address] and [facility]. Patient stated Odell did not need contacted. Patient discussed recent surgery and how his current health might be a side effect. Patient stated he wants to get to the bottom his health concerns.  offered prayer which was accepted by patient. Outcome:  Patient continues coping with his current health while being receptive to  visit. Plan:  Chaplains will remain available to offer spiritual and emotional support as needed.       Electronically signed by Navin Kincaid on 1/6/2023 at 11:07 PM.  [address] [place] [address]  [phone]

## 2023-01-07 NOTE — ED NOTES
Report called to [facility] on 3C who verbalized understanding. All questions answered at this time. Pt denies needs prior to admission. Transport notified at this time.       Roxy Damico RN  01/07/23 4275

## 2023-01-07 NOTE — ED NOTES
Sw attempted ACP documentation, but was unable to at this time.       [facility], [facility]  01/07/23 [address] [facility], [facility]  01/07/23 9539

## 2023-01-07 NOTE — ED NOTES
Feliberto Infante, DO at bedside to evaluate pt     Marianna Jordan, MARCO ANTONIO  01/06/23 1687 Records request sent and nothing received back. 2nd records request sent again today.

## 2023-01-07 NOTE — ED NOTES
Pt resting on ED cart with eyes closed. Pt has no distress noted.       Andrea Murphy RN  01/07/23 0801

## 2023-01-07 NOTE — DISCHARGE INSTRUCTIONS
You were seen at [facility] observation unit for your nausea and vomiting. We treated you with medications to control your symptoms and we now feel you are safe for discharge home. Please begin to take Sucralfate 4 times per day and take pantoprazole once in the morning and once in the evening to help control your symptoms. Please return to the emergency department immediately if you develop any new or worsening symptoms including chest pain, shortness of breath, abdominal pain, nausea, vomiting, diarrhea, weakness, loss conscious, fever, chills, headache, weakness, or any other concerns. Please call your primary care provider and schedule appointment the next 24 to 48 hours for follow-up.

## 2023-01-07 NOTE — ED NOTES
Pt had episode of emesis at bedside that appeared to be blood tinged.       Pat Aguirre RN  01/06/23 9462

## 2023-01-07 NOTE — H&P
[facility]  CDU / OBSERVATION eNCOUnter  Resident Note     Pt Name: Gavi Hernandez  MRN: [id number]  [place] 1940  Date of evaluation: 1/7/23  Patient's PCP is :  JW Lawrence CNP    CHIEF COMPLAINT       Chief Complaint   Patient presents with    Abdominal Pain    Emesis    Fatigue     Generalized weakness. HISTORY OF PRESENT ILLNESS    Gavi Hernandez is a 79 y.o. male who presents nausea and vomiting, unable to tolerate PO intake. Patient had a cholecystectomy in October 2022. Patient was seen at OSH ED 2 days ago for generalized weakness, abdominal pain, chest pain and was discharged. Patient denies fever, chest pain, shortness of breath, blood in the vomit, blood in stools, constipation. Location/Symptom: Nausea, vomiting  Timing/Onset: Couple days  Provocation: None  Quality: None  Radiation: None  Severity: None  Timing/Duration: Couple days  Modifying Factors: No improvement with antiemetics    REVIEW OF SYSTEMS     General ROS - No fevers, No malaise   Ophthalmic ROS - No discharge, No changes in vision  ENT ROS -  No sore throat, No rhinorrhea,   Respiratory ROS - no shortness of breath, no cough, no  wheezing  Cardiovascular ROS - No chest pain, no dyspnea on exertion  Gastrointestinal ROS - No abdominal pain, + nausea or vomiting, no change in bowel habits, no black or bloody stools  Genito-Urinary ROS - No dysuria, trouble voiding, or hematuria  Musculoskeletal ROS - No myalgias, No arthalgias  Neurological ROS - No headache, no dizziness/lightheadedness, No focal weakness, no loss of sensation  Dermatological ROS - No lesions, No rash     (PQRS) Advance directives on face sheet per hospital policy.  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 ([facility] [address] CKD (chronic kidney disease) stage 2, GFR 60-89 ml/min, CKD (chronic kidney disease) stage 3, GFR 30-59 ml/min [facility]), Diverticulosis of colon, GERD (gastroesophageal reflux disease), History of non-ST elevation myocardial infarction (NSTEMI) 6/2022, Impaired renal function, MRSA (methicillin resistant staph aureus) culture positive, Osteoarthritis of knee, Parkinson disease ([facility] [address] Proteinuria, Skull fracture ([facility] [address] Temporary loss of eyesight, and Tubular adenoma of colon. I have reviewed the past medical history with the patient and it is pertinent to this complaint. SURGICAL HISTORY      has a past surgical history that includes Colonoscopy (11/02/2012); shoulder surgery (Right); pr colsc flx w/rmvl of tumor polyp lesion snare tq (N/A, 09/19/2017); Colonoscopy (09/19/2017); Cholecystectomy, laparoscopic (10/29/2022); and Cholecystectomy, laparoscopic (N/A, 10/29/2022). I have reviewed and agree with Surgical History entered and it is pertinent to this complaint.      CURRENT MEDICATIONS     sodium chloride flush 0.9 % injection 5-40 mL, 2 times per day  sodium chloride flush 0.9 % injection 5-40 mL, PRN  0.9 % sodium chloride infusion, PRN  enoxaparin (LOVENOX) injection 40 mg, Daily  acetaminophen (TYLENOL) tablet 650 mg, Q4H PRN  ondansetron (ZOFRAN-ODT) disintegrating tablet 4 mg, Q8H PRN   Or  ondansetron (ZOFRAN) injection 4 mg, Q6H PRN  allopurinol (ZYLOPRIM) tablet 100 mg, Daily  amitriptyline (ELAVIL) tablet 10 mg, Nightly  atorvastatin (LIPITOR) tablet 40 mg, Nightly  carbidopa-levodopa (SINEMET)  MG per tablet 1 tablet, 4x Daily  clopidogrel (PLAVIX) tablet 75 mg, Daily  entacapone (COMTAN) tablet 200 mg, 4x Daily  isosorbide mononitrate (IMDUR) extended release tablet 30 mg, Daily  magnesium oxide (MAG-OX) tablet 400 mg, Daily  pantoprazole (PROTONIX) tablet 40 mg, QAM AC  rOPINIRole (REQUIP) tablet 0.25 mg, TID  tamsulosin (FLOMAX) capsule 0.4 mg, Daily  metoprolol tartrate (LOPRESSOR) tablet 12.5 mg, BID  prochlorperazine (COMPAZINE) injection 10 mg, Once  diphenhydrAMINE (BENADRYL) injection 25 mg, Once        All medication charted and reviewed. ALLERGIES     is allergic to aspirin. FAMILY HISTORY     He indicated that the status of his mother is unknown. He indicated that the status of his father is unknown.     family history includes No Known Problems in his father and mother. The patient denies any pertinent family history. I have reviewed and agree with the family history entered. I have reviewed the Family History and it is not significant to the case    SOCIAL HISTORY      reports that he has quit smoking. He has never used smokeless tobacco. He reports that he does not drink alcohol and does not use drugs. I have reviewed and agree with all Social.  There are no concerns for substance abuse/use. PHYSICAL EXAM     INITIAL VITALS:  height is 5' 7\" (1.702 m) and weight is 180 lb (81.6 kg). His oral temperature is 97 °F (36.1 °C). His blood pressure is 156/79 (abnormal) and his pulse is 80. His respiration is 18 and oxygen saturation is 98%.       CONSTITUTIONAL: AOx4, no apparent distress, appears stated age    HEAD: normocephalic, atraumatic   EYES: PERRLA, EOMI    ENT: moist mucous membranes, uvula midline   NECK: supple, symmetric   BACK: symmetric   LUNGS: clear to auscultation bilaterally   CARDIOVASCULAR: regular rate and rhythm, no murmurs, rubs or gallops   ABDOMEN: soft, non-tender, non-distended with normal active bowel sounds   NEUROLOGIC:  MAEx4, no focal sensory or motor deficits   MUSCULOSKELETAL: no clubbing, cyanosis or edema   SKIN: no rash or wounds       DIFFERENTIAL DIAGNOSIS/MDM:   Vomiting:  DDX: SBO, DKA, Abdominal (gastroenteritis, appendicitis, gallbladder, pancreatitis, PUD, perforation), ICH, meningitis, vertigo, hyperemesis, abnormal lytes, EtOH intoxication/ tox, post-tussive, ACS/ MI.    DIAGNOSTIC RESULTS   RADIOLOGY:   I directly visualized the following  images and reviewed the radiologist interpretations:    CT ABDOMEN PELVIS W IV CONTRAST Additional Contrast? None    Result Date: 1/6/2023  EXAMINATION: CT OF THE ABDOMEN AND PELVIS WITH CONTRAST 1/6/2023 9:58 pm TECHNIQUE: CT of the abdomen and pelvis was performed with the administration of intravenous contrast. Multiplanar reformatted images are provided for review. Automated exposure control, iterative reconstruction, and/or weight based adjustment of the mA/kV was utilized to reduce the radiation dose to as low as reasonably achievable. COMPARISON: CT of 11/29/2022 HISTORY: ORDERING SYSTEM PROVIDED HISTORY: ill defined fluid collection post op from cholecystectomy october 2022, now with epigastric pain and vomiting, blood tinged TECHNOLOGIST PROVIDED HISTORY: ill defined fluid collection post op from cholecystectomy october 2022, now with epigastric pain and vomiting, blood tinged Decision Support Exception - unselect if not a suspected or confirmed emergency medical condition->Emergency Medical Condition (MA) Reason for Exam: ill defined fluid collection post op from cholecystectomy october 2022, now with epigastric pain and vomiting, blood tinged FINDINGS: LOWER CHEST: 1 cm tubular structure within the right lower lobe which appears to be an impacted bronchus, similar to prior CT examination of 06/27/2022. Visualized portion of the lower chest demonstrates no acute abnormality. Small axial hiatal hernia containing part of the stomach. KIDNEYS AND URINARY TRACT: No renal calculi are identified. There is no evidence for hydronephrosis. The ureters are of normal course and caliber. ORGANS: Status post cholecystectomy with interval decrease in fluid density within the gallbladder fossa now measuring 1.7 x 2.6 cm in comparison with prior examination when it measured 3.1 x 3 cm. Findings are likely suggestive of postsurgical seroma/hematoma which is gradually resolving. Visualized portions of the liver, spleen, pancreas,  and adrenal glands demonstrate no acute abnormality.  GI/BOWEL: No bowel obstruction. No evidence of acute appendicitis. Diverticulosis with no evidence of diverticulitis. PELVIS: The bladder and pelvic organs are unremarkable. PERITONEUM/RETROPERITONEUM: No free air or free fluid is noted. No pathologically enlarged lymphadenopathy. The vasculature do not demonstrate acute abnormality. BONES/SOFT TISSUES: The osseous structures demonstrate no acute abnormality. Multilevel disc and facet degenerative disease. At L4-L5, grade 1 anterolisthesis. Appendix is normal.  No obstructive uropathy. Status post cholecystectomy with interval decrease in fluid density within the gallbladder fossa now measuring 1.7 x 2.6 cm in comparison with prior examination when it measured 3.1 x 3 cm. Findings are likely suggestive of postsurgical seroma/hematoma which is gradually resolving. Diverticulosis with no evidence of diverticulitis. Small axial hiatal hernia containing part of the stomach. 1 cm tubular structure within the right lower lobe which appears to be an impacted bronchus, similar to prior CT examination of 06/27/2022. XR CHEST PORTABLE    Result Date: 1/6/2023  EXAMINATION: ONE XRAY VIEW OF THE CHEST 1/6/2023 7:15 pm COMPARISON: 11/28/2022. HISTORY: ORDERING SYSTEM PROVIDED HISTORY: blood tinged emesis TECHNOLOGIST PROVIDED HISTORY: blood tinged emesis FINDINGS: Cardiomediastinal silhouette appears within normal limits. No focal consolidation or overt pulmonary edema. Costophrenic angles are sharp. No evidence of pneumothorax. No acute osseous abnormalities. No radiographic evidence of an acute cardiopulmonary process. LABS:  I have reviewed and interpreted all available lab results.   Labs Reviewed   COMPREHENSIVE METABOLIC PANEL W/ REFLEX TO MG FOR LOW K - Abnormal; Notable for the following components:       Result Value    Glucose 129 (*)     Creatinine 1.47 (*)     Est, Glom Filt Rate 47 (*)     Anion Gap 8 (*)     All other components within normal limits TROPONIN - Abnormal; Notable for the following components:    Troponin, High Sensitivity 24 (*)     All other components within normal limits   TROPONIN - Abnormal; Notable for the following components:    Troponin, High Sensitivity 25 (*)     All other components within normal limits   COVID-19, RAPID   CBC WITH AUTO DIFFERENTIAL   LIPASE   PROTIME-INR   APTT   LACTIC ACID   TYPE AND SCREEN     CDU IMPRESSION / PLAN      Gavi Hernandez is a 79 y.o. male who presents with nausea and vomiting. Nausea, vomiting  Symptom management  Advance diet as tolerated  GI consulted as patient is still having nausea and vomiting even with antiemetics. Unable to tolerate clear liquid diet. We will follow-up with GI recommendations. Protonix 20 mg intravenous twice daily  Will obtain CT head to rule out mass or intracranial pressure as a cause of the intractable nausea vomiting. Maintenance IV fluids 75 mL/h until patient is able to tolerate p.o. intake. Continue home medications and pain control  Monitor vitals, labs, and imaging  DISPO: pending consults and clinical improvement    CONSULTS:    IP CONSULT TO CASE MANAGEMENT    PROCEDURES:  Not indicated       PATIENT REFERRED TO:    Dejan Swann, JW - CNP  [address] [facility]  [address] [facility]  [address] [facility] [address]  [phone]    In 1 week      [facility]  [address] [facility]  [phone]    As needed    Randi Christensen, [address] [facility]  [address] [facility] [address]  [phone]    In 1 week        --  Fay Hammond MD   Emergency Medicine Resident     This dictation was generated by voice recognition computer software. Although all attempts are made to edit the dictation for accuracy, there may be errors in the transcription that are not intended.

## 2023-01-07 NOTE — PROGRESS NOTES
[address]  CDU / OBSERVATION ENCOUNTER  ATTENDING NOTE       I performed a history and physical examination of the patient and discussed management with the resident or midlevel provider. I reviewed the resident or midlevel provider's note and agree with the documented findings and plan of care. Any areas of disagreement are noted on the chart. I was personally present for the key portions of any procedures. I have documented in the chart those procedures where I was not present during the key portions. I have reviewed the nurses notes. I agree with the chief complaint, past medical history, past surgical history, allergies, medications, social and family history as documented unless otherwise noted below. The Family history, social history, and ROS are effectively unchanged since admission unless noted elsewhere in the chart. History of cholecystectomy approximately a month ago. Patient mated observation unit last month and was here for approximately a week and a half with similar symptoms. Patient had complaints of nausea and vomiting. Patient without clear etiology. Patient has subsequently been to the hospital and was discharged in there. Patient comes effectively right to [facility] for similar symptoms. Work-up negative so far. Patient has not had good explanation for symptoms. Doubt acute surgical issue but will have surgery reevaluate. Will involve GI as patient is essentially on a month of intractable nausea and vomiting. CT scan done yesterday negative.   Appreciate specialist recommendations    Joshua Fuentes MD  Attending Emergency  Physician

## 2023-01-07 NOTE — PROGRESS NOTES
[facility])  Occupational Therapy Not Seen Note    DATE: 2023    NAME: Janina Wisdom  MRN: [id number]   : 1940      Patient not seen this date for Occupational Therapy due to:    Testing: CT     Next Scheduled Treatment: check back 2023    Electronically signed by BRODERICK Sierra on 2023 at 2:16 PM

## 2023-01-07 NOTE — PLAN OF CARE
Problem: Chronic Conditions and Co-morbidities  Goal: Patient's chronic conditions and co-morbidity symptoms are monitored and maintained or improved  Outcome: Progressing     Problem: Pain  Goal: Verbalizes/displays adequate comfort level or baseline comfort level  Outcome: Progressing     Problem: Safety - Adult  Goal: Free from fall injury  Outcome: Progressing  Flowsheets (Taken 1/7/2023 1807)  Free From Fall Injury: Instruct family/caregiver on patient safety

## 2023-01-07 NOTE — ED NOTES
The following labs were labeled with appropriate pt sticker and tubed to lab:     [] Blue     [] Lavender   [] on ice  [x] Green/yellow  [x] Green/black [] on ice  [] Arlester Core  [] on ice  [] Yellow  [] Red  [] Type/ Screen  [] ABG  [] VBG    [] COVID-19 swab    [] Rapid  [] PCR  [] Flu swab  [] Peds Viral Panel     [] Urine Sample  [] Fecal Sample  [] Pelvic Cultures  [] Blood Cultures  [] X 2  [] STREP Cultures       Joey Espinoza RN  01/06/23 0238

## 2023-01-08 PROBLEM — R10.13 EPIGASTRIC PAIN: Status: ACTIVE | Noted: 2023-01-08

## 2023-01-08 PROBLEM — R11.10 HYPEREMESIS: Status: ACTIVE | Noted: 2023-01-08

## 2023-01-08 LAB
ABSOLUTE EOS #: 0.23 K/UL (ref 0–0.44)
ABSOLUTE IMMATURE GRANULOCYTE: <0.03 K/UL (ref 0–0.3)
ABSOLUTE LYMPH #: 1.36 K/UL (ref 1.1–3.7)
ABSOLUTE MONO #: 0.6 K/UL (ref 0.1–1.2)
ANION GAP SERPL CALCULATED.3IONS-SCNC: 6 MMOL/L (ref 9–17)
BASOPHILS # BLD: 1 % (ref 0–2)
BASOPHILS ABSOLUTE: 0.03 K/UL (ref 0–0.2)
BUN BLDV-MCNC: 10 MG/DL (ref 8–23)
CALCIUM SERPL-MCNC: 8.3 MG/DL (ref 8.6–10.4)
CHLORIDE BLD-SCNC: 107 MMOL/L (ref 98–107)
CO2: 26 MMOL/L (ref 20–31)
CREAT SERPL-MCNC: 1.16 MG/DL (ref 0.7–1.2)
EOSINOPHILS RELATIVE PERCENT: 5 % (ref 1–4)
GFR SERPL CREATININE-BSD FRML MDRD: >60 ML/MIN/1.73M2
GLUCOSE BLD-MCNC: 94 MG/DL (ref 70–99)
HCT VFR BLD CALC: 39.4 % (ref 40.7–50.3)
HEMOGLOBIN: 13 G/DL (ref 13–17)
IMMATURE GRANULOCYTES: 0 %
LYMPHOCYTES # BLD: 27 % (ref 24–43)
MAGNESIUM: 2 MG/DL (ref 1.6–2.6)
MCH RBC QN AUTO: 31.9 PG (ref 25.2–33.5)
MCHC RBC AUTO-ENTMCNC: 33 G/DL (ref 28.4–34.8)
MCV RBC AUTO: 96.8 FL (ref 82.6–102.9)
MONOCYTES # BLD: 12 % (ref 3–12)
NRBC AUTOMATED: 0 PER 100 WBC
PDW BLD-RTO: 14.1 % (ref 11.8–14.4)
PLATELET # BLD: 182 K/UL (ref 138–453)
PMV BLD AUTO: 9.1 FL (ref 8.1–13.5)
POTASSIUM SERPL-SCNC: 4.2 MMOL/L (ref 3.7–5.3)
RBC # BLD: 4.07 M/UL (ref 4.21–5.77)
SEG NEUTROPHILS: 55 % (ref 36–65)
SEGMENTED NEUTROPHILS ABSOLUTE COUNT: 2.81 K/UL (ref 1.5–8.1)
SODIUM BLD-SCNC: 139 MMOL/L (ref 135–144)
WBC # BLD: 5 K/UL (ref 3.5–11.3)

## 2023-01-08 PROCEDURE — 97162 PT EVAL MOD COMPLEX 30 MIN: CPT

## 2023-01-08 PROCEDURE — 96372 THER/PROPH/DIAG INJ SC/IM: CPT

## 2023-01-08 PROCEDURE — 6370000000 HC RX 637 (ALT 250 FOR IP)

## 2023-01-08 PROCEDURE — C9113 INJ PANTOPRAZOLE SODIUM, VIA: HCPCS | Performed by: EMERGENCY MEDICINE

## 2023-01-08 PROCEDURE — 80048 BASIC METABOLIC PNL TOTAL CA: CPT

## 2023-01-08 PROCEDURE — 85025 COMPLETE CBC W/AUTO DIFF WBC: CPT

## 2023-01-08 PROCEDURE — 96361 HYDRATE IV INFUSION ADD-ON: CPT

## 2023-01-08 PROCEDURE — 97535 SELF CARE MNGMENT TRAINING: CPT

## 2023-01-08 PROCEDURE — 6360000002 HC RX W HCPCS

## 2023-01-08 PROCEDURE — 36415 COLL VENOUS BLD VENIPUNCTURE: CPT

## 2023-01-08 PROCEDURE — 97166 OT EVAL MOD COMPLEX 45 MIN: CPT

## 2023-01-08 PROCEDURE — 2580000003 HC RX 258: Performed by: EMERGENCY MEDICINE

## 2023-01-08 PROCEDURE — 97116 GAIT TRAINING THERAPY: CPT

## 2023-01-08 PROCEDURE — 6360000002 HC RX W HCPCS: Performed by: EMERGENCY MEDICINE

## 2023-01-08 PROCEDURE — 96376 TX/PRO/DX INJ SAME DRUG ADON: CPT

## 2023-01-08 PROCEDURE — APPNB30 APP NON BILLABLE TIME 0-30 MINS: Performed by: INTERNAL MEDICINE

## 2023-01-08 PROCEDURE — 83735 ASSAY OF MAGNESIUM: CPT

## 2023-01-08 PROCEDURE — 6370000000 HC RX 637 (ALT 250 FOR IP): Performed by: EMERGENCY MEDICINE

## 2023-01-08 PROCEDURE — 99221 1ST HOSP IP/OBS SF/LOW 40: CPT | Performed by: INTERNAL MEDICINE

## 2023-01-08 PROCEDURE — 1200000000 HC SEMI PRIVATE

## 2023-01-08 RX ORDER — MENTHOL 1.4 %
ADHESIVE PATCH, MEDICATED TOPICAL 3 TIMES DAILY PRN
Status: DISCONTINUED | OUTPATIENT
Start: 2023-01-08 | End: 2023-01-13 | Stop reason: HOSPADM

## 2023-01-08 RX ORDER — SODIUM CHLORIDE 9 MG/ML
1000 INJECTION, SOLUTION INTRAVENOUS CONTINUOUS
Status: ACTIVE | OUTPATIENT
Start: 2023-01-08 | End: 2023-01-09

## 2023-01-08 RX ADMIN — ACETAMINOPHEN 650 MG: 325 TABLET ORAL at 05:44

## 2023-01-08 RX ADMIN — ROPINIROLE HYDROCHLORIDE 0.25 MG: 0.25 TABLET, FILM COATED ORAL at 10:16

## 2023-01-08 RX ADMIN — MAGNESIUM GLUCONATE 500 MG ORAL TABLET 400 MG: 500 TABLET ORAL at 10:19

## 2023-01-08 RX ADMIN — CARBIDOPA AND LEVODOPA 1 TABLET: 25; 100 TABLET ORAL at 17:07

## 2023-01-08 RX ADMIN — CLOPIDOGREL 75 MG: 75 TABLET, FILM COATED ORAL at 10:18

## 2023-01-08 RX ADMIN — CARBIDOPA AND LEVODOPA 1 TABLET: 25; 100 TABLET ORAL at 10:18

## 2023-01-08 RX ADMIN — METOPROLOL TARTRATE 12.5 MG: 25 TABLET ORAL at 10:17

## 2023-01-08 RX ADMIN — ENTACAPONE 200 MG: 200 TABLET, FILM COATED ORAL at 13:49

## 2023-01-08 RX ADMIN — SODIUM CHLORIDE, PRESERVATIVE FREE 40 MG: 5 INJECTION INTRAVENOUS at 17:03

## 2023-01-08 RX ADMIN — ENOXAPARIN SODIUM 40 MG: 100 INJECTION SUBCUTANEOUS at 10:18

## 2023-01-08 RX ADMIN — ISOSORBIDE MONONITRATE 30 MG: 30 TABLET ORAL at 10:16

## 2023-01-08 RX ADMIN — TAMSULOSIN HYDROCHLORIDE 0.4 MG: 0.4 CAPSULE ORAL at 10:17

## 2023-01-08 RX ADMIN — ENTACAPONE 200 MG: 200 TABLET, FILM COATED ORAL at 20:12

## 2023-01-08 RX ADMIN — AMITRIPTYLINE HYDROCHLORIDE 10 MG: 10 TABLET, FILM COATED ORAL at 20:12

## 2023-01-08 RX ADMIN — ENTACAPONE 200 MG: 200 TABLET, FILM COATED ORAL at 10:17

## 2023-01-08 RX ADMIN — ROPINIROLE HYDROCHLORIDE 0.25 MG: 0.25 TABLET, FILM COATED ORAL at 13:49

## 2023-01-08 RX ADMIN — SODIUM CHLORIDE 1000 ML: 9 INJECTION, SOLUTION INTRAVENOUS at 17:03

## 2023-01-08 RX ADMIN — DESMOPRESSIN ACETATE 40 MG: 0.2 TABLET ORAL at 20:12

## 2023-01-08 RX ADMIN — SODIUM CHLORIDE, PRESERVATIVE FREE 20 MG: 5 INJECTION INTRAVENOUS at 05:39

## 2023-01-08 RX ADMIN — ALLOPURINOL 100 MG: 100 TABLET ORAL at 10:17

## 2023-01-08 RX ADMIN — CARBIDOPA AND LEVODOPA 1 TABLET: 25; 100 TABLET ORAL at 13:48

## 2023-01-08 RX ADMIN — CARBIDOPA AND LEVODOPA 1 TABLET: 25; 100 TABLET ORAL at 20:13

## 2023-01-08 RX ADMIN — ROPINIROLE HYDROCHLORIDE 0.25 MG: 0.25 TABLET, FILM COATED ORAL at 20:12

## 2023-01-08 RX ADMIN — ENTACAPONE 200 MG: 200 TABLET, FILM COATED ORAL at 17:07

## 2023-01-08 RX ADMIN — Medication: at 05:40

## 2023-01-08 RX ADMIN — SODIUM CHLORIDE 1000 ML: 9 INJECTION, SOLUTION INTRAVENOUS at 22:49

## 2023-01-08 RX ADMIN — ONDANSETRON 4 MG: 2 INJECTION INTRAMUSCULAR; INTRAVENOUS at 08:51

## 2023-01-08 ASSESSMENT — PAIN SCALES - GENERAL: PAINLEVEL_OUTOF10: 3

## 2023-01-08 NOTE — PROGRESS NOTES
Physical Therapy  Facility/Department: [address] OBSERVATION  Physical Therapy Initial Assessment    Name: Sharron Gannon  : 1940  MRN: [id number]  Date of Service: 2023    Discharge Recommendations:  Patient would benefit from continued therapy after discharge, Therapy recommended at discharge          Patient Diagnosis(es): The encounter diagnosis was Nausea and vomiting, unspecified vomiting type. 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 Past Surgical History:  has a past surgical history that includes Colonoscopy (2012); shoulder surgery (Right); pr colsc flx w/rmvl of tumor polyp lesion snare tq (N/A, 2017); Colonoscopy (2017); Cholecystectomy, laparoscopic (10/29/2022); and Cholecystectomy, laparoscopic (N/A, 10/29/2022). Assessment   Assessment: Pt required CGA for transfers, CGA/mion Afor ambultion due to weakness and history of parkinson's. Pt reporting dizzyness with mobility, LOB once, required min A for balance. Pt high fall risk, pt very independent prior to 2 months ago, at [facility] able to perform lfight of steps to do laundry task on his own, Pt below his PLOF at this time and will benefit from continued therapy. Treatment Diagnosis: Weakness, fall risk.   Therapy Prognosis: Good  Decision Making: Medium Complexity  History: Hx of parkinson's  Requires PT Follow-Up: Yes     Plan   Physcial Therapy Plan  General Plan: 5-7 times per week  Current Treatment Recommendations: Strengthening, Balance training, Functional mobility training, Transfer training, Endurance training, Gait training, Stair training, Home exercise program, Safety education & training, Patient/Caregiver education & training, Therapeutic activities  Safety Devices  Type of Devices: All fall risk precautions in place, Bed alarm in place, Call light within reach, Gait belt, Left in bed     Restrictions  Restrictions/Precautions  Required Braces or Orthoses?: Yes  Required Braces or Orthoses  Left Lower Extremity Brace: Knee Brace     Subjective   Pain: Pain in stomach, \"a little bit\", did not rate pain level. General  Patient assessed for rehabilitation services?: Yes  Additional Pertinent Hx: HISTORY OF PRESENT ILLNESS   Aziza Runner is a 79 y.o. male who presents nausea and vomiting, unable to tolerate PO intake. Patient had a cholecystectomy in October 2022. Patient was seen at OSH ED 2 days ago for generalized weakness, abdominal pain, chest pain and was discharged. Patient denies fever, chest pain, shortness of breath, blood in the vomit, blood in stools, constipation. Referral Date : 01/06/23  Diagnosis: Nausea and vomiting  Follows Commands: Within Functional Limits  Subjective  Subjective: I feel weak, i have gallbladder problem, since than not eating much and feel weak now.          Social/Functional History  Social/Functional History  Lives With: Other (comment), Alone  Type of Home:  (Duplex, sone one else lives on 2nd floor)  Home Layout: Performs ADL's on one level, Two level  Home Access: Stairs to enter with rails  Entrance Stairs - Number of Steps: 5  Entrance Stairs - Rails: Both  Bathroom Shower/Tub: Shower chair with back, Walk-in shower  Bathroom Toilet: Standard  Bathroom Equipment: Shower chair, Hand-held shower  Bathroom Accessibility: Accessible  Home Equipment: Chidite Hopes, rolling, Hospital bed  Has the patient had two or more falls in the past year or any fall with injury in the past year?: No  Receives Help From: Neighbor, Friend(s)  ADL Assistance: Independent  Homemaking Assistance: Independent  Homemaking Responsibilities: Yes  Ambulation Assistance: Independent (RW or a cane)  Transfer Assistance: Independent (Cane or rolling walker)  Active : Yes  Mode of Transportation: Car, Cab  Occupation: Retired  Type of Occupation: Retired   Additional Comments: Laundry in the basement, pt does own alundry, does own shopping, friend does cleaning. , pt does own cooking  Vision/Hearing  Vision  Vision: Impaired  Vision Exceptions: Wears glasses at all times  Hearing  Hearing: Within functional limits    Cognition   Orientation  Overall Orientation Status: Within Normal Limits  Orientation Level: Oriented X4     Objective   Heart Rate: 53  BP: 128/66  MAP (Calculated): 87  Resp: 18  SpO2: 100 %  O2 Device: None (Room air)     Observation/Palpation  Observation: Resting tremors noted B hands. AROM RLE (degrees)  RLE AROM: WFL  AROM LLE (degrees)  LLE General AROM: Lacks 8 degrees terminal extension Left knee. Strength RLE  Comment: 4-/5  Strength LLE  Comment: 4-/5     Sensation  Overall Sensation Status: Impaired (Decreased sensation B feet)     Bed mobility  Supine to Sit: Stand by assistance  Sit to Supine: Stand by assistance  Scooting: Contact guard assistance  Transfers  Sit to Stand: Stand by assistance  Stand to Sit: Stand by assistance  Ambulation  Surface: Level tile  Device: Rolling Walker  Assistance: Contact guard assistance;Minimal assistance  Quality of Gait: Presetns wit hshort choppy steps with forward momentum 2* parkinson's. Pt is high fall risk at this time. Needs cues to slow down. Pt also reports feeling lightheaded and dizzy. Symptoms does not change with time. Gait Deviations: Decreased step length; Increased SHABBIR; Slow Ewa;Decreased step height;Shuffles;Decreased head and trunk rotation  Distance: 40 ft  Comments: Not safe to mobilize without assistance yet. Balance  Posture: Fair  Sitting - Static: Good  Sitting - Dynamic: Fair;+  Standing - Static: Fair;+ (RW)  Standing - Dynamic: Fair (RW)     AM-PAC Score  AM-PAC Inpatient Mobility Raw Score : 17 (01/08/23 1312)  AM-PAC Inpatient T-Scale Score : 42.13 (01/08/23 1312)  Mobility Inpatient CMS 0-100% Score: 50.57 (01/08/23 1312)  Mobility Inpatient CMS G-Code Modifier : CK (01/08/23 1312)       Goals  Short Term Goals  Time Frame for Short Term Goals: 10 visits  Short Term Goal 1: Pt to demonstrate safe and independent bed mobility  Short Term Goal 2: Transfers at supervision level. Short Term Goal 3: Ambulate with least restricitive device distance of 80 ft x 2, CGA  Short Term Goal 4: Pt able to perfrom 6\" step upsx 5 to 8 reps, to imporve balance and strength. Short Term Goal 5: Tolerate activity for 30 minutes to improve strength and mobility  Patient Goals   Patient Goals : Get stronger       Education  Patient Education  Education Given To: Patient  Education Provided: Role of Therapy;Plan of Care;Precautions;Transfer Training;Orientation; Fall Prevention Strategies  Education Method: Demonstration;Verbal  Education Outcome: Verbalized understanding;Demonstrated understanding;Continued education needed      Therapy Time   Individual Concurrent Group Co-treatment   Time In 1143         Time Out 1212         Minutes 29         Timed Code Treatment Minutes: [facility],

## 2023-01-08 NOTE — CARE COORDINATION
Pt is ambulatory in room today with walker, continues to want snf placement for strengthening, awaiting pt/ot eval

## 2023-01-08 NOTE — PROGRESS NOTES
OBS/CDU   RESIDENT NOTE      Patients PCP is:  Rachel Sebastian, APRN - [place]        SUBJECTIVE      Patient is able to tolerate ginger ale but complains of persistent nausea and feeling sick to his stomach. PHYSICAL EXAM      General: NAD, AO X 3  Heent: EMOI, PERRL  Neck: SUPPLE, NO JVD  Cardiovascular: RRR, S1S2  Pulmonary: CTAB, NO SOB  Abdomen: SOFT, NTTP, ND, +BS  Extremities: +2/4 PULSES DISTAL, NO SWELLING  Neuro / Psych: NO NUMBNESS OR TINGLING, MENTATION AT BASELINE    PERTINENT TEST /EXAMS      I have reviewed all available laboratory results. MEDICATIONS CURRENT   methyl salicylate-menthol (JOSEPH BRAXTON GREASELESS) 10-15 % cream CREA, TID PRN  0.9 % sodium chloride infusion, Continuous  sodium chloride flush 0.9 % injection 5-40 mL, 2 times per day  sodium chloride flush 0.9 % injection 5-40 mL, PRN  0.9 % sodium chloride infusion, PRN  enoxaparin (LOVENOX) injection 40 mg, Daily  acetaminophen (TYLENOL) tablet 650 mg, Q4H PRN  ondansetron (ZOFRAN-ODT) disintegrating tablet 4 mg, Q8H PRN   Or  ondansetron (ZOFRAN) injection 4 mg, Q6H PRN  allopurinol (ZYLOPRIM) tablet 100 mg, Daily  amitriptyline (ELAVIL) tablet 10 mg, Nightly  atorvastatin (LIPITOR) tablet 40 mg, Nightly  carbidopa-levodopa (SINEMET)  MG per tablet 1 tablet, 4x Daily  clopidogrel (PLAVIX) tablet 75 mg, Daily  entacapone (COMTAN) tablet 200 mg, 4x Daily  isosorbide mononitrate (IMDUR) extended release tablet 30 mg, Daily  magnesium oxide (MAG-OX) tablet 400 mg, Daily  rOPINIRole (REQUIP) tablet 0.25 mg, TID  tamsulosin (FLOMAX) capsule 0.4 mg, Daily  metoprolol tartrate (LOPRESSOR) tablet 12.5 mg, BID  pantoprazole (PROTONIX) 20 mg in sodium chloride (PF) 0.9 % 5 mL injection, BID AC  prochlorperazine (COMPAZINE) injection 10 mg, Once  diphenhydrAMINE (BENADRYL) injection 25 mg, Once        All medication charted and reviewed.     CONSULTS      IP CONSULT TO CASE MANAGEMENT  IP CONSULT TO GI    ASSESSMENT/PLAN       Zaid haynes a 79 y.o. male who presents with      Persistent nausea and vomiting  Protonix twice daily  Antiemetics  Maintenance IV fluids 75 mL/h  GI consulted due to persistent nausea and vomiting. They plan to perform EGD tomorrow. Patient will be n.p.o. at midnight. We will follow-up with GI recommendations and EGD results. Continue home medications and pain control  Monitor vitals, labs, and imaging    DISPO: pending consults and clinical improvement    --  Fay Hammond MD  Emergency Medicine Resident Physician     This dictation was generated by voice recognition computer software. Although all attempts are made to edit the dictation for accuracy, there may be errors in the transcription that are not intended.

## 2023-01-08 NOTE — PLAN OF CARE
GI consulted for nausea and vomiting    Patient seen in the company of Dr. Werner Segura.        Patient is reporting persistent nausea and vomiting as well as epigastric pain following cholecystectomy October 2022. No prior history of persistent nausea and vomiting. Recommendations/plan:  We will plan for EGD tomorrow  N.PO after midnight        Full consult note to follow by Dr Werner Segura      .. Jacob Shane, APRN - CNP

## 2023-01-08 NOTE — PROGRESS NOTES
[address] [facility]  CDU / OBSERVATION ENCOUNTER  ATTENDING NOTE       I performed a history and physical examination of the patient and discussed management with the resident or midlevel provider. I reviewed the resident or midlevel provider's note and agree with the documented findings and plan of care. Any areas of disagreement are noted on the chart. I was personally present for the key portions of any procedures. I have documented in the chart those procedures where I was not present during the key portions. I have reviewed the nurses notes. I agree with the chief complaint, past medical history, past surgical history, allergies, medications, social and family history as documented unless otherwise noted below. The Family history, social history, and ROS are effectively unchanged since admission unless noted elsewhere in the chart. Patient seen and evaluated. Continues to feel unwell. He is tolerating ginger ale. Seen by GI due to persistent nausea and vomiting and status postcholecystectomy in October 2022. Patient to have EGD tomorrow morning. And n.p.o. after midnight. This was discussed with patient as well as nursing. Who are aware of plan. Patient's abdomen at this time is soft, without rebound or guarding. He is ambulatory in the room.       Yahaira Beard  Attending Emergency  Physician

## 2023-01-08 NOTE — PROGRESS NOTES
Occupational Therapy  Facility/Department: [address] OBSERVATION  Occupational Therapy Initial Assessment    Name: Rosa Mckeon  : 1940  MRN: [id number]  Date of Service: 2023    Discharge Recommendations:   Further therapy recommended at discharge. Patient Diagnosis(es): The encounter diagnosis was Nausea and vomiting, unspecified vomiting type. 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 Past Surgical History:  has a past surgical history that includes Colonoscopy (2012); shoulder surgery (Right); pr colsc flx w/rmvl of tumor polyp lesion snare tq (N/A, 2017); Colonoscopy (2017); Cholecystectomy, laparoscopic (10/29/2022); and Cholecystectomy, laparoscopic (N/A, 10/29/2022). Assessment   Performance deficits / Impairments: Decreased functional mobility ; Decreased ADL status; Decreased endurance;Decreased high-level IADLs;Decreased strength;Decreased fine motor control;Decreased balance  Assessment: pt would benefit from further therapy at discharge before returning to prior living arrangement in order to increase safety and independence with ADLS and functional transfers/functional mobility.   Prognosis: Good  Decision Making: Medium Complexity  REQUIRES OT FOLLOW-UP: Yes  Activity Tolerance  Activity Tolerance: Patient Tolerated treatment well        Plan   Occupational Therapy Plan  Times Per Week: 3-4x/wk Restrictions  Restrictions/Precautions  Restrictions/Precautions: Fall Risk  Required Braces or Orthoses?: No  Required Braces or Orthoses  Left Lower Extremity Brace: Knee Brace from home   Position Activity Restriction  Other position/activity restrictions: up with assist, hx of Parkinson's    Subjective   General  Patient assessed for rehabilitation services?: Yes  Family / Caregiver Present: No  Diagnosis: abdominal pain  General Comment  Comments: RN ok'd for therapy this morning. pt agreeable to participate in session and cooperative throughout. pt 6/10 abdominal pain, pt able to continue with session. pt reported nausea throughout      Social/Functional History  Social/Functional History  Lives With: Alone  Type of Home: House  Home Layout: One level  Home Access: Stairs to enter with rails  Entrance Stairs - Number of Steps: 5  Entrance Stairs - Rails: Both  Bathroom Shower/Tub: Tub/Shower unit  Bathroom Toilet: Standard  Bathroom Equipment: Shower chair  Bathroom Accessibility: Accessible  Home Equipment: Nataly Lanius, rolling (pt using cane or RW depending on day)  Has the patient had two or more falls in the past year or any fall with injury in the past year?: No  Receives Help From: [employer], Friend(s)  ADL Assistance: [address] [place] [address] Avenue: Independent  Homemaking Responsibilities: Yes  Meal Prep Responsibility: Primary  Laundry Responsibility: Primary  Cleaning Responsibility: Primary  Ambulation Assistance: Independent  Transfer Assistance: Independent  Active : Yes  Mode of Transportation: Car  Occupation: Retired  Type of Occupation:   Additional Comments: pt reported neighbor very supportive and able to assist PRN       Objective               Safety Devices  Type of Devices: Call light within reach; Left in bed;Gait belt;Patient at risk for falls;Nurse notified; Bed alarm in place  Restraints  Restraints Initially in Place: No    Bed Mobility Training  Bed Mobility Training: Yes  Overall Level of Assistance: Minimum assistance  Supine to Sit: Minimum assistance  Sit to Supine: Minimum assistance  Scooting: Contact-guard assistance  Balance  Sitting: Intact (~10 minutes on EOB with SBA)  Standing: With support (~1 minute. pt completed static standing at bedside for sheet management and took side steps toward [facility]. Kamar Patella pt provided with CGA and RW throughout. no further functional mobility completed d/t nausea and dizziness)  Transfer Training  Transfer Training: Yes  Overall Level of Assistance: Minimum assistance (with RW)  Sit to Stand: Minimum assistance  Stand to Sit: Contact-guard assistance  Gait  Overall Level of Assistance: Contact-guard assistance (with RW)       AROM: Generally decreased, functional  Strength: Generally decreased, functional (B  4-/5, B shoulders 4/5)  Coordination: Generally decreased, functional (pt exhibited resting tremors to B UE)  Tone: Normal  Sensation: Impaired (pt reported N/T to B hands)    ADL  Feeding: Independent  Grooming: Modified independent   UE Bathing: Contact guard assistance  LE Bathing: Moderate assistance  UE Dressing: Contact guard assistance  LE Dressing: Moderate assistance  Toileting: Minimal assistance  Additional Comments: OT facilitated pt in washing face while sitting on EOB.  pt able to complete with no difficulties                Vision  Vision: Impaired  Vision Exceptions: Wears glasses at all times  Hearing  Hearing: Exceptions to [facility]  Hearing Exceptions: Hard of hearing/hearing concerns    Cognition  Overall Cognitive Status: WFL  Orientation  Overall Orientation Status: Within Functional Limits  Orientation Level: Oriented X4                   Education Given To: Patient  Education Provided: Role of Therapy;Plan of Care;Transfer Training  Education Provided Comments: safety during functional transfers/functional mobility  Education Method: Verbal  Barriers to Learning: None  Education Outcome: Verbalized understanding    LUE AROM (degrees)  LUE AROM : Exceptions  L Shoulder Flexion 0-180: 0-90  L Elbow Flexion 0-145: WFL  Left Hand AROM (degrees)  Left Hand AROM: WFL  RUE AROM (degrees)  RUE AROM : Exceptions  R Shoulder Flexion 0-180: 0-90  R Elbow Flexion 0-145: WFL  Right Hand AROM (degrees)  Right Hand AROM: WFL                                                                 AM-PAC Score        AM-PAC Inpatient Daily Activity Raw Score: 18 (01/08/23 1338)  AM-PAC Inpatient ADL T-Scale Score : 38.66 (01/08/23 1338)  ADL Inpatient CMS 0-100% Score: 46.65 (01/08/23 1338)  ADL Inpatient CMS G-Code Modifier : CK (01/08/23 1338)           Goals  Short Term Goals  Time Frame for Short Term Goals: pt will, by discharge  Short Term Goal 1: complete LB ADLs with min A, setu p and AE, as needed  Short Term Goal 2: complete UB ADLs with supervision  Short Term Goal 3: increase activity tolerance to 15+ minutes in order to participate in daily tasks  Short Term Goal 4: dem SBA during functional transfers/functional mobility with LRD ,as needed  Short Term Goal 5: dem ~6 minutes dynamic standing tolerance with SBA in order to complete functional tasks       Therapy Time   Individual Concurrent Group Co-treatment   Time In 0913         Time Out 0931         Minutes 18         Timed Code Treatment Minutes: [address] OTR/L

## 2023-01-08 NOTE — CONSULTS
Initial GI Consult Note  Today's Date and Time: 1/8/2023, 2:23 PM      Chief complain/Reason for consultation:   Nausea and vomiting     History of Present Illness:   Rylie Malone is a 79y.o.-year-old male who was initially admitted on 1/6/2023. Patient seen at the request of Dr. Hugo Moreira for nausea and vomiting which has been intermittent since October since his cholecystectomy associated with some abdominal discomfort in epigastric area. He had a CT of the abdomen which showed small blood collection in the gallbladder fossa which has been decreasing in size compared to the previous CT scan. He is feeling little bit better and yesterday had only nausea without any episodes of vomiting. Today he tolerated breakfast.  He denies any prior upper endoscopy. I have personally reviewed the past medical history, past surgical history, medications, social history, and family history, and I have updated the database accordingly.   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 ([facility] [address] 3/18/2011    d/t being hurt on the job    CKD (chronic kidney disease) stage 2, GFR 60-89 ml/min     CKD (chronic kidney disease) stage 3, GFR 30-59 ml/min (HCC)     Diverticulosis of colon     GERD (gastroesophageal reflux disease)     History of non-ST elevation myocardial infarction (NSTEMI) 6/2022 10/27/2022    Impaired renal function     MRSA (methicillin resistant staph aureus) culture positive 9/23/2015    leg    Osteoarthritis of knee     Left    Parkinson disease ([address]     Proteinuria     Skull fracture ([facility] [address] 3/18/2011    d/t 12-foot drop on the job    Temporary loss of eyesight     periodically, happened x7    [facility]       Past Surgical  History:     Past Surgical History:   Procedure Laterality Date    CHOLECYSTECTOMY, LAPAROSCOPIC  10/29/2022    LAPROSCOPIC CHOLECYSTECTOMY, POSSIBLE OPEN    CHOLECYSTECTOMY, LAPAROSCOPIC N/A 10/29/2022    LAPROSCOPIC CHOLECYSTECTOMY performed by Columba Patel DO at [address]  11/02/2012    poor prep, tubular adenoma    COLONOSCOPY  09/19/2017    diverticulosis, multiple polyps as described, spot marking done, pathology-tubular adenoma x 4    PA COLSC FLX W/RMVL OF TUMOR POLYP LESION SNARE TQ N/A 09/19/2017    COLONOSCOPY POLYPECTOMY SNARE/COLD BIOPSY with tatooing and apc transverse colon performed by Bib Álvarez MD at [id number] [place] [address] Right     rotator cuff       Medications:      sodium chloride flush  5-40 mL IntraVENous 2 times per day    enoxaparin  40 mg SubCUTAneous Daily    allopurinol  100 mg Oral Daily    amitriptyline  10 mg Oral Nightly    atorvastatin  40 mg Oral Nightly    carbidopa-levodopa  1 tablet Oral 4x Daily    clopidogrel  75 mg Oral Daily    entacapone  200 mg Oral 4x Daily    isosorbide mononitrate  30 mg Oral Daily    magnesium oxide  400 mg Oral Daily    rOPINIRole  0.25 mg Oral TID    tamsulosin  0.4 mg Oral Daily    metoprolol tartrate  12.5 mg Oral BID    pantoprazole (PROTONIX) 40 mg injection  20 mg IntraVENous BID AC    prochlorperazine  10 mg IntraVENous Once    diphenhydrAMINE  25 mg IntraVENous Once       Social History:     Social History     Socioeconomic History    Marital status: Single     Spouse name: Not on file    Number of children: Not on file    Years of education: Not on file    Highest education level: Not on file   Occupational History    Not on file   Tobacco Use    Smoking status: Former    Smokeless tobacco: Never   Vaping Use    Vaping Use: Never used   Substance and Sexual Activity    Alcohol use: No    Drug use: No    Sexual activity: Not Currently   Other Topics Concern    Not on file   Social History Narrative    Not on file     Social Determinants of Health     Financial Resource Strain: Low Risk     Difficulty of Paying Living Expenses: Not hard at all   Food Insecurity: No Food Insecurity Worried About [address] in the Last Year: Never true    [address] in the Last Year: Never true   Transportation Needs: No Transportation Needs    Lack of Transportation (Medical): No    Lack of Transportation (Non-Medical): No   Physical Activity: Inactive    Days of Exercise per Week: 0 days    Minutes of Exercise per Session: 0 min   Stress: Stress Concern Present    Feeling of Stress : To some extent   Social Connections: Socially Isolated    Frequency of Communication with Friends and Family: Three times a week    Frequency of Social Gatherings with Friends and Family: Three times a week    Attends [employer] Services: Never    Active Member of Clubs or Organizations: No    Attends Club or Organization Meetings: Never    Marital Status:    Intimate Partner Violence: Not At Risk    Fear of Current or Ex-Partner: No    Emotionally Abused: No    Physically Abused: No    Sexually Abused: No   Housing Stability: Low Risk     Unable to Pay for Housing in the Last Year: No    Number of Places Lived in the Last Year: 1    Unstable Housing in the Last Year: No       Family History:     Family History   Problem Relation Age of Onset    No Known Problems Mother     No Known Problems Father         Allergies:   Aspirin     Review of Systems:   Review of systems as per history of present illness. Rest of the review of systems were reviewed and was negative. Physical Examination :   Patient Vitals for the past 8 hrs:   BP Temp Pulse Resp SpO2   01/08/23 0730 128/66 96.8 °F (36 °C) 53 18 100 %     General Appearance: Awake, alert, and in no apparent distress  Head:  Normocephalic, no trauma  Eyes: No icterus, no pallor. Pulmonary/Chest: Breathing normal bilaterally. No accessory muscle use. Abdomen: Soft, tender in epigastric area. Bowel sounds normal. No organomegaly  All four Extremities: No cyanosis, clubbing, edema, or effusions. Neurologic: No asterixis.     Medical Decision Making:   I have independently reviewed/ordered the following labs:  CBC with Differential:   Recent Labs     01/06/23  1907 01/08/23  0555   WBC 7.1 5.0   HGB 14.4 13.0   HCT 42.9 39.4*    182   LYMPHOPCT 24 27   MONOPCT 12 12     BMP:   Recent Labs     01/06/23  1907 01/08/23  0555    139   K 4.9 4.2    107   CO2 27 26   BUN 15 10   CREATININE 1.47* 1.16   MG  --  2.0     Hepatic Function Panel:  @LABRCNT(PROT:2,LABALBU:2,BILIDIR:2,IBILI:2,BILITOT:2,ALKPHOS:2,ALT:2,AST:2)  )  Lab Results   Component Value Date/Time    MUCUS NOT REPORTED 02/16/2022 12:09 PM    RBC 4.07 01/08/2023 05:55 AM    RBC 5.14 02/24/2012 11:00 AM    TRICHOMONAS NOT REPORTED 02/16/2022 12:09 PM    WBC 5.0 01/08/2023 05:55 AM    YEAST NOT REPORTED 02/16/2022 12:09 PM    TURBIDITY Clear 11/30/2022 02:25 PM     Lab Results   Component Value Date/Time    CREATININE 1.16 01/08/2023 05:55 AM    GLUCOSE 94 01/08/2023 05:55 AM    GLUCOSE 97 04/24/2012 12:55 PM       Imaging: CT of the abdomen films were reviewed which showed small amount of fluid in the gallbladder fossa. .  Bile duct was not dilated. 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 Recommendations   Give empiric trial of PPI therapy. We will plan for upper endoscopy tomorrow. N.p.o. after midnight. If symptoms persist we can consider trial of low-dose desipramine at bedtime if upper endoscopy is negative for any peptic ulcer disease. Thank you for allowing us to participate in the care of this patient. Please call with questions.   Michelle Cisse MD, Anni Avalos

## 2023-01-09 ENCOUNTER — ANESTHESIA (OUTPATIENT)
Dept: OPERATING ROOM | Age: 83
End: 2023-01-09
Payer: MEDICARE

## 2023-01-09 ENCOUNTER — CARE COORDINATION (OUTPATIENT)
Dept: CARE COORDINATION | Age: 83
End: 2023-01-09

## 2023-01-09 ENCOUNTER — ANESTHESIA EVENT (OUTPATIENT)
Dept: OPERATING ROOM | Age: 83
End: 2023-01-09
Payer: MEDICARE

## 2023-01-09 LAB
ABSOLUTE EOS #: 0.18 K/UL (ref 0–0.44)
ABSOLUTE IMMATURE GRANULOCYTE: <0.03 K/UL (ref 0–0.3)
ABSOLUTE LYMPH #: 1.16 K/UL (ref 1.1–3.7)
ABSOLUTE MONO #: 0.48 K/UL (ref 0.1–1.2)
ANION GAP SERPL CALCULATED.3IONS-SCNC: 8 MMOL/L (ref 9–17)
BASOPHILS # BLD: 1 % (ref 0–2)
BASOPHILS ABSOLUTE: <0.03 K/UL (ref 0–0.2)
BUN BLDV-MCNC: 7 MG/DL (ref 8–23)
CALCIUM SERPL-MCNC: 8.8 MG/DL (ref 8.6–10.4)
CHLORIDE BLD-SCNC: 108 MMOL/L (ref 98–107)
CO2: 21 MMOL/L (ref 20–31)
CREAT SERPL-MCNC: 1.15 MG/DL (ref 0.7–1.2)
EKG ATRIAL RATE: 220 BPM
EKG P AXIS: 26 DEGREES
EKG Q-T INTERVAL: 432 MS
EKG QRS DURATION: 106 MS
EKG QTC CALCULATION (BAZETT): 438 MS
EKG R AXIS: -38 DEGREES
EKG T AXIS: 49 DEGREES
EKG VENTRICULAR RATE: 62 BPM
EOSINOPHILS RELATIVE PERCENT: 4 % (ref 1–4)
GFR SERPL CREATININE-BSD FRML MDRD: >60 ML/MIN/1.73M2
GLUCOSE BLD-MCNC: 108 MG/DL (ref 70–99)
HCT VFR BLD CALC: 40 % (ref 40.7–50.3)
HEMOGLOBIN: 13.2 G/DL (ref 13–17)
IMMATURE GRANULOCYTES: 0 %
LYMPHOCYTES # BLD: 27 % (ref 24–43)
MAGNESIUM: 2.2 MG/DL (ref 1.6–2.6)
MCH RBC QN AUTO: 32.4 PG (ref 25.2–33.5)
MCHC RBC AUTO-ENTMCNC: 33 G/DL (ref 28.4–34.8)
MCV RBC AUTO: 98 FL (ref 82.6–102.9)
MONOCYTES # BLD: 11 % (ref 3–12)
NRBC AUTOMATED: 0 PER 100 WBC
PDW BLD-RTO: 14.2 % (ref 11.8–14.4)
PLATELET # BLD: 188 K/UL (ref 138–453)
PMV BLD AUTO: 9.3 FL (ref 8.1–13.5)
POTASSIUM SERPL-SCNC: 4.4 MMOL/L (ref 3.7–5.3)
RBC # BLD: 4.08 M/UL (ref 4.21–5.77)
SEG NEUTROPHILS: 57 % (ref 36–65)
SEGMENTED NEUTROPHILS ABSOLUTE COUNT: 2.41 K/UL (ref 1.5–8.1)
SODIUM BLD-SCNC: 137 MMOL/L (ref 135–144)
WBC # BLD: 4.3 K/UL (ref 3.5–11.3)

## 2023-01-09 PROCEDURE — 2580000003 HC RX 258: Performed by: INTERNAL MEDICINE

## 2023-01-09 PROCEDURE — 6360000002 HC RX W HCPCS: Performed by: EMERGENCY MEDICINE

## 2023-01-09 PROCEDURE — 97530 THERAPEUTIC ACTIVITIES: CPT

## 2023-01-09 PROCEDURE — 85025 COMPLETE CBC W/AUTO DIFF WBC: CPT

## 2023-01-09 PROCEDURE — 36415 COLL VENOUS BLD VENIPUNCTURE: CPT

## 2023-01-09 PROCEDURE — 3609017100 HC EGD: Performed by: INTERNAL MEDICINE

## 2023-01-09 PROCEDURE — 1200000000 HC SEMI PRIVATE

## 2023-01-09 PROCEDURE — 7100000011 HC PHASE II RECOVERY - ADDTL 15 MIN: Performed by: INTERNAL MEDICINE

## 2023-01-09 PROCEDURE — 80048 BASIC METABOLIC PNL TOTAL CA: CPT

## 2023-01-09 PROCEDURE — 6370000000 HC RX 637 (ALT 250 FOR IP): Performed by: EMERGENCY MEDICINE

## 2023-01-09 PROCEDURE — 6360000002 HC RX W HCPCS: Performed by: INTERNAL MEDICINE

## 2023-01-09 PROCEDURE — 43235 EGD DIAGNOSTIC BRUSH WASH: CPT | Performed by: INTERNAL MEDICINE

## 2023-01-09 PROCEDURE — 0DJ08ZZ INSPECTION OF UPPER INTESTINAL TRACT, VIA NATURAL OR ARTIFICIAL OPENING ENDOSCOPIC: ICD-10-PCS | Performed by: INTERNAL MEDICINE

## 2023-01-09 PROCEDURE — C9113 INJ PANTOPRAZOLE SODIUM, VIA: HCPCS | Performed by: EMERGENCY MEDICINE

## 2023-01-09 PROCEDURE — 2580000003 HC RX 258: Performed by: EMERGENCY MEDICINE

## 2023-01-09 PROCEDURE — 2500000003 HC RX 250 WO HCPCS

## 2023-01-09 PROCEDURE — 6370000000 HC RX 637 (ALT 250 FOR IP): Performed by: INTERNAL MEDICINE

## 2023-01-09 PROCEDURE — 83735 ASSAY OF MAGNESIUM: CPT

## 2023-01-09 PROCEDURE — 6360000002 HC RX W HCPCS

## 2023-01-09 PROCEDURE — C9113 INJ PANTOPRAZOLE SODIUM, VIA: HCPCS | Performed by: INTERNAL MEDICINE

## 2023-01-09 PROCEDURE — 3700000000 HC ANESTHESIA ATTENDED CARE: Performed by: INTERNAL MEDICINE

## 2023-01-09 PROCEDURE — 7100000010 HC PHASE II RECOVERY - FIRST 15 MIN: Performed by: INTERNAL MEDICINE

## 2023-01-09 RX ORDER — PROPOFOL 10 MG/ML
INJECTION, EMULSION INTRAVENOUS PRN
Status: DISCONTINUED | OUTPATIENT
Start: 2023-01-09 | End: 2023-01-09 | Stop reason: SDUPTHER

## 2023-01-09 RX ORDER — SODIUM CHLORIDE 0.9 % (FLUSH) 0.9 %
5-40 SYRINGE (ML) INJECTION EVERY 12 HOURS SCHEDULED
Status: DISCONTINUED | OUTPATIENT
Start: 2023-01-09 | End: 2023-01-09 | Stop reason: HOSPADM

## 2023-01-09 RX ORDER — SODIUM CHLORIDE 9 MG/ML
INJECTION, SOLUTION INTRAVENOUS CONTINUOUS
Status: DISCONTINUED | OUTPATIENT
Start: 2023-01-09 | End: 2023-01-09 | Stop reason: HOSPADM

## 2023-01-09 RX ORDER — LIDOCAINE 4 G/G
1 PATCH TOPICAL ONCE
Status: COMPLETED | OUTPATIENT
Start: 2023-01-09 | End: 2023-01-10

## 2023-01-09 RX ORDER — SODIUM CHLORIDE 0.9 % (FLUSH) 0.9 %
5-40 SYRINGE (ML) INJECTION PRN
Status: DISCONTINUED | OUTPATIENT
Start: 2023-01-09 | End: 2023-01-09 | Stop reason: HOSPADM

## 2023-01-09 RX ORDER — ONDANSETRON 2 MG/ML
4 INJECTION INTRAMUSCULAR; INTRAVENOUS
Status: DISCONTINUED | OUTPATIENT
Start: 2023-01-09 | End: 2023-01-09 | Stop reason: HOSPADM

## 2023-01-09 RX ORDER — SODIUM CHLORIDE 9 MG/ML
INJECTION, SOLUTION INTRAVENOUS PRN
Status: DISCONTINUED | OUTPATIENT
Start: 2023-01-09 | End: 2023-01-09 | Stop reason: HOSPADM

## 2023-01-09 RX ORDER — HYDRALAZINE HYDROCHLORIDE 20 MG/ML
10 INJECTION INTRAMUSCULAR; INTRAVENOUS
Status: DISCONTINUED | OUTPATIENT
Start: 2023-01-09 | End: 2023-01-09 | Stop reason: HOSPADM

## 2023-01-09 RX ORDER — LIDOCAINE HYDROCHLORIDE 10 MG/ML
INJECTION, SOLUTION EPIDURAL; INFILTRATION; INTRACAUDAL; PERINEURAL PRN
Status: DISCONTINUED | OUTPATIENT
Start: 2023-01-09 | End: 2023-01-09 | Stop reason: SDUPTHER

## 2023-01-09 RX ORDER — SUCRALFATE 1 G/1
1 TABLET ORAL EVERY 6 HOURS SCHEDULED
Status: DISCONTINUED | OUTPATIENT
Start: 2023-01-09 | End: 2023-01-13 | Stop reason: HOSPADM

## 2023-01-09 RX ADMIN — METOPROLOL TARTRATE 12.5 MG: 25 TABLET ORAL at 19:58

## 2023-01-09 RX ADMIN — PROPOFOL 70 MG: 10 INJECTION, EMULSION INTRAVENOUS at 08:51

## 2023-01-09 RX ADMIN — SODIUM CHLORIDE, PRESERVATIVE FREE 40 MG: 5 INJECTION INTRAVENOUS at 06:55

## 2023-01-09 RX ADMIN — LIDOCAINE HYDROCHLORIDE 50 MG: 10 INJECTION, SOLUTION EPIDURAL; INFILTRATION; INTRACAUDAL; PERINEURAL at 08:49

## 2023-01-09 RX ADMIN — SUCRALFATE 1 G: 1 TABLET ORAL at 14:47

## 2023-01-09 RX ADMIN — MAGNESIUM GLUCONATE 500 MG ORAL TABLET 400 MG: 500 TABLET ORAL at 11:18

## 2023-01-09 RX ADMIN — DESMOPRESSIN ACETATE 40 MG: 0.2 TABLET ORAL at 19:58

## 2023-01-09 RX ADMIN — ALLOPURINOL 100 MG: 100 TABLET ORAL at 11:14

## 2023-01-09 RX ADMIN — ISOSORBIDE MONONITRATE 30 MG: 30 TABLET ORAL at 11:16

## 2023-01-09 RX ADMIN — AMITRIPTYLINE HYDROCHLORIDE 10 MG: 10 TABLET, FILM COATED ORAL at 19:58

## 2023-01-09 RX ADMIN — CARBIDOPA AND LEVODOPA 1 TABLET: 25; 100 TABLET ORAL at 17:22

## 2023-01-09 RX ADMIN — ROPINIROLE HYDROCHLORIDE 0.25 MG: 0.25 TABLET, FILM COATED ORAL at 19:58

## 2023-01-09 RX ADMIN — CARBIDOPA AND LEVODOPA 1 TABLET: 25; 100 TABLET ORAL at 19:58

## 2023-01-09 RX ADMIN — SODIUM CHLORIDE, PRESERVATIVE FREE 40 MG: 5 INJECTION INTRAVENOUS at 17:22

## 2023-01-09 RX ADMIN — SODIUM CHLORIDE, PRESERVATIVE FREE 10 ML: 5 INJECTION INTRAVENOUS at 20:04

## 2023-01-09 RX ADMIN — SODIUM CHLORIDE: 9 INJECTION, SOLUTION INTRAVENOUS at 08:08

## 2023-01-09 RX ADMIN — ENTACAPONE 200 MG: 200 TABLET, FILM COATED ORAL at 19:58

## 2023-01-09 RX ADMIN — TAMSULOSIN HYDROCHLORIDE 0.4 MG: 0.4 CAPSULE ORAL at 11:17

## 2023-01-09 RX ADMIN — SUCRALFATE 1 G: 1 TABLET ORAL at 17:18

## 2023-01-09 RX ADMIN — ROPINIROLE HYDROCHLORIDE 0.25 MG: 0.25 TABLET, FILM COATED ORAL at 11:17

## 2023-01-09 RX ADMIN — CARBIDOPA AND LEVODOPA 1 TABLET: 25; 100 TABLET ORAL at 11:14

## 2023-01-09 RX ADMIN — CLOPIDOGREL 75 MG: 75 TABLET, FILM COATED ORAL at 11:15

## 2023-01-09 RX ADMIN — ENTACAPONE 200 MG: 200 TABLET, FILM COATED ORAL at 11:16

## 2023-01-09 RX ADMIN — ENTACAPONE 200 MG: 200 TABLET, FILM COATED ORAL at 17:22

## 2023-01-09 RX ADMIN — ONDANSETRON 4 MG: 2 INJECTION INTRAMUSCULAR; INTRAVENOUS at 20:03

## 2023-01-09 ASSESSMENT — PAIN DESCRIPTION - DESCRIPTORS: DESCRIPTORS: DISCOMFORT;BURNING

## 2023-01-09 ASSESSMENT — PAIN - FUNCTIONAL ASSESSMENT
PAIN_FUNCTIONAL_ASSESSMENT: ACTIVITIES ARE NOT PREVENTED
PAIN_FUNCTIONAL_ASSESSMENT: 0-10

## 2023-01-09 NOTE — PLAN OF CARE
Problem: Chronic Conditions and Co-morbidities  Goal: Patient's chronic conditions and co-morbidity symptoms are monitored and maintained or improved  Outcome: Progressing     Problem: Pain  Goal: Verbalizes/displays adequate comfort level or baseline comfort level  Outcome: Progressing     Problem: Safety - Adult  Goal: Free from fall injury  Outcome: Progressing     Problem: Skin/Tissue Integrity  Goal: Absence of new skin breakdown  Description: 1. Monitor for areas of redness and/or skin breakdown  2. Assess vascular access sites hourly  3. Every 4-6 hours minimum:  Change oxygen saturation probe site  4. Every 4-6 hours:  If on nasal continuous positive airway pressure, respiratory therapy assess nares and determine need for appliance change or resting period.   Outcome: Progressing

## 2023-01-09 NOTE — OP NOTE
EGD      Patient:   Zaid Lee    :    1940    Facility:   [facility]   Referring/PCP: JW Brandon CNP    Procedure:   Esophagogastroduodenoscopy --diagnostic  Date:     2023   Endoscopist:  Bora Gomez MD, [facility]    Indication:    Nausea, vomiting, epigastric pain    Postprocedure diagnosis:       Anesthesia:  MAC    Complications: None    EBL: None from the procedure    Specimen: None    Description of Procedure:  Informed consent was obtained from the patient after explanation of the procedure including indications, description of the procedure,  benefits and possible risks and complications of the procedure, and alternatives. Questions were answered. The patient's history was reviewed and a directed physical examination was performed prior to the procedure. Patient was monitored throughout the procedure with pulse oximetry and periodic assessment of vital signs. Patient was sedated as noted above. With the patient in the left lateral decubitus position, the Olympus videoendoscope was placed in the patient's mouth and under direct visualization passed into the esophagus. Visualization of the esophagus, stomach, and duodenum was performed during both introduction and withdrawal of the endoscope and retroflexed view of the proximal stomach was obtained. The scope was passed to the 2nd portion of the duodenum. The patient tolerated the procedure well and was taken to the recovery area in good condition. Findings[de-identified]   Esophagus: Severe grade D reflux esophagitis with esophageal ulcerations. Esophagus otherwise normal.   Stomach: Small sliding hiatal hernia. Stomach otherwise normal.   Duodenum: normal    Recommendations: PPI BID for 8 weeks and then daily indefinitely. Antireflux diet. OK to discharge from GI standpoint.      Electronically signed by Bora Gomez MD, FACG  on 2023 at 9:02 AM

## 2023-01-09 NOTE — PROGRESS NOTES
Occupational Therapy  Facility/Department: [facility] 3C OBSERVATION  Occupational Therapy Daily Treatment Note    Name: Juliana Alexander  : 1940  MRN: [id number]  Date of Service: 2023    Discharge Recommendations:Further therapy recommended at discharge in order to increase pt balance, strength and independence. Assessment   Performance deficits / Impairments: Decreased functional mobility ; Decreased ADL status; Decreased endurance;Decreased high-level IADLs;Decreased strength;Decreased balance;Decreased safe awareness  Prognosis: Good  REQUIRES OT FOLLOW-UP: Yes  Activity Tolerance  Activity Tolerance: Patient Tolerated treatment well        Plan   Occupational Therapy Plan  Times Per Week: 3-4x/wk     Restrictions  Restrictions/Precautions  Restrictions/Precautions: Fall Risk  Required Braces or Orthoses?: No  Required Braces or Orthoses  Left Lower Extremity Brace: Knee Brace (personal knee brace)  Position Activity Restriction  Other position/activity restrictions: up with assist, hx of Parkinson's    Subjective   General  Chart Reviewed: Yes  Family / Caregiver Present: No  General Comment  Comments: Pt supine in bed at start of session and retired to seated on toilet at session end with nursing staff present. Pt denied pain.             Objective        Safety Devices  Type of Devices: Gait belt;Nurse notified  Restraints  Restraints Initially in Place: No  Balance  Sitting: Without support (Pt tolerated approx 5 min static sitting unsupported)  Standing: With support (CGA static standing with RW tolerating approx 2 min at toilet)  Gait  Overall Level of Assistance: Contact-guard assistance (EOB>bathroom utilizing RW demo 0 LOB however demo decreased safety awareness)  Toilet Transfers  Toilet - Technique: Ambulating  Equipment Used: Standard toilet  Toilet Transfer: Contact guard assistance  Toilet Transfers Comments: utilizing RW and grab bars     ADL  LE Dressing: Minimal assistance;Setup;Verbal cueing  LE Dressing Skilled Clinical Factors: To doff brief as simulated underwear pt required Min A to thread off of BLE  Additional Comments:  Throughout session pt limited per fatigue and tremors        Bed mobility  Supine to Sit: Stand by assistance  Scooting: Stand by assistance  Bed Mobility Comments: HOB elevated, utilizing bed rails  Transfers  Sit to stand: Contact guard assistance  Stand to sit: Contact guard assistance  Transfer Comments: utilizing RW     Cognition  Overall Cognitive Status: WFL  Orientation  Overall Orientation Status: Within Functional Limits                  Education Given To: Patient  Education Provided: Role of Therapy;Transfer Training;Precautions  Education Provided Comments: proper hand and foot placement; balance maintaince; walker management-F carry over  Education Method: Verbal  Education Outcome: Continued education needed                    AM-PAC Score        AM-PAC Inpatient Daily Activity Raw Score: 18 (01/09/23 1648)  AM-PAC Inpatient ADL T-Scale Score : 38.66 (01/09/23 1648)  ADL Inpatient CMS 0-100% Score: 46.65 (01/09/23 1648)  ADL Inpatient CMS G-Code Modifier : CK (01/09/23 1648)      Goals  Short Term Goals  Time Frame for Short Term Goals: pt will, by discharge  Short Term Goal 1: complete LB ADLs with min A, setu p and AE, as needed  Short Term Goal 2: complete UB ADLs with supervision  Short Term Goal 3: increase activity tolerance to 15+ minutes in order to participate in daily tasks  Short Term Goal 4: dem SBA during functional transfers/functional mobility with LRD ,as needed  Short Term Goal 5: dem ~6 minutes dynamic standing tolerance with SBA in order to complete functional tasks       Therapy Time   Individual Concurrent Group Co-treatment   Time In 1627         Time Out 1635         Minutes 8         Timed Code Treatment Minutes: [address], [place]/L

## 2023-01-09 NOTE — ANESTHESIA POSTPROCEDURE EVALUATION
Department of Anesthesiology  Postprocedure Note    Patient: Ivelisse Degroot  MRN: [id number]  YOB: 1940  Date of evaluation: 1/9/2023      Procedure Summary     Date: 01/09/23 Room / Location: [address]    Anesthesia Start: 4581 Anesthesia Stop: 0520    Procedure: EGD ESOPHAGOGASTRODUODENOSCOPY Diagnosis:       Nausea and vomiting, unspecified vomiting type      Epigastric abdominal pain      (Nausea and vomiting, unspecified vomiting type [R11.2])      (Epigastric abdominal pain [R10.13])    Surgeons: Saba Cheung MD Responsible Provider: Chadwick Yee MD    Anesthesia Type: MAC ASA Status: 3          Anesthesia Type: No value filed.     Caroline Phase I: Caroline Score: 6    Caroline Phase II:        Anesthesia Post Evaluation    Patient location during evaluation: bedside  Patient participation: complete - patient participated  Level of consciousness: awake  Airway patency: patent  Nausea & Vomiting: no nausea and no vomiting  Complications: no  Cardiovascular status: blood pressure returned to baseline  Respiratory status: acceptable  Hydration status: euvolemic  Comments: BP (!) 116/55   Pulse 55   Temp 97.6 °F (36.4 °C) (Temporal)   Resp 16   Ht 5' 7\" (1.702 m)   Wt 180 lb (81.6 kg)   SpO2 97%   BMI 28.19 kg/m²

## 2023-01-09 NOTE — ACP (ADVANCE CARE PLANNING)
Advance Care Planning     Advance Care Planning Activator (Inpatient)  Conversation Note      Date of ACP Conversation: 1/9/2023     Conversation Conducted with: Patient with Decision Making Capacity    ACP Activator: Ashlee Yun RN        Health Care Decision Maker:     Current Designated Health Care Decision Maker:     Primary Decision Maker: Keshawn Guevara - [phone]    Secondary Decision Maker: Lavelle Roque - [phone]  Click here to complete Healthcare Decision Makers including section of the Healthcare Decision Maker Relationship (ie \"Primary\")  Today we documented Decision Maker(s) consistent with Legal Next of Kin hierarchy. Care Preferences    Ventilation: \"If you were in your present state of health and suddenly became very ill and were unable to breathe on your own, what would your preference be about the use of a ventilator (breathing machine) if it were available to you? \"      Would the patient desire the use of ventilator (breathing machine)?: yes    \"If your health worsens and it becomes clear that your chance of recovery is unlikely, what would your preference be about the use of a ventilator (breathing machine) if it were available to you? \"     Would the patient desire the use of ventilator (breathing machine)?: Yes      Resuscitation  \"CPR works best to restart the heart when there is a sudden event, like a heart attack, in someone who is otherwise healthy. Unfortunately, CPR does not typically restart the heart for people who have serious health conditions or who are very sick. \"    \"In the event your heart stopped as a result of an underlying serious health condition, would you want attempts to be made to restart your heart (answer \"yes\" for attempt to resuscitate) or would you prefer a natural death (answer \"no\" for do not attempt to resuscitate)? \" yes       [] Yes   [] No   Educated Patient / Junaid Renee regarding differences between Advance Directives and portable DNR orders.     Length of ACP Conversation in minutes:      Conversation Outcomes:  [] ACP discussion completed  [] Existing advance directive reviewed with patient; no changes to patient's previously recorded wishes  [] New Advance Directive completed  [] Portable Do Not Rescitate prepared for Provider review and signature  [] POLST/POST/MOLST/MOST prepared for Provider review and signature      Follow-up plan:    [] Schedule follow-up conversation to continue planning  [] Referred individual to Provider for additional questions/concerns   [] Advised patient/agent/surrogate to review completed ACP document and update if needed with changes in condition, patient preferences or care setting    [] This note routed to one or more involved healthcare providers

## 2023-01-09 NOTE — PROGRESS NOTES
[address]  CDU / OBSERVATION ENCOUNTER  ATTENDING NOTE       I performed a history and physical examination of the patient and discussed management with the resident or midlevel provider. I reviewed the resident or midlevel provider's note and agree with the documented findings and plan of care. Any areas of disagreement are noted on the chart. I was personally present for the key portions of any procedures. I have documented in the chart those procedures where I was not present during the key portions. I have reviewed the nurses notes. I agree with the chief complaint, past medical history, past surgical history, allergies, medications, social and family history as documented unless otherwise noted below. The Family history, social history, and ROS are effectively unchanged since admission unless noted elsewhere in the chart. Patient for EGD today. Patient having CT scan which did not show specific cause of nausea vomiting. Patient with persistent symptoms however and no prior EGD. GI consulted. Appreciate input. Patient has had persistent symptoms over the last several weeks. Severe gastritis noted on EGD. Will follow GI recommendations. Patient will need to pass oral challenge prior to discharge but diagnosis more clear now.     Herb Jefferson MD  Attending Emergency  Physician

## 2023-01-09 NOTE — CARE COORDINATION
Patient phoned the [facility] to inform that he just had a procedure today and will be going  back to the [facility]. HC inquired if the patient would have to cancel his appointment. Patient agreed that he won't be going to his appointment. He stated that he will stay in touch  with the [facility]. HC phoned [employer] and cancelled his cab for 01/13/23. Plan of Care  [facility] will be happy to hear from the patient and to assist when he is discharged.

## 2023-01-09 NOTE — ANESTHESIA PRE PROCEDURE
Department of Anesthesiology  Preprocedure Note       Name:  Nain To   Age:  79 y.o.  :  1940                                          MRN:  [id number]         Date:  2023      Surgeon: Luz Harvey):  Jordyn Fuentes MD    Procedure: Procedure(s):  EGD ESOPHAGOGASTRODUODENOSCOPY    Medications prior to admission:   Prior to Admission medications    Medication Sig Start Date End Date Taking? Authorizing Provider   ondansetron (ZOFRAN) 4 MG tablet Take 1 tablet by mouth every 8 hours as needed for Nausea 23  Carlota Smiley MD   ibuprofen (ADVIL;MOTRIN) 800 MG tablet Take 1 tablet by mouth 2 times daily as needed for Pain 22   Jesus Doty MD   tamsulosin [facility]) 0.4 MG capsule Take 1 capsule by mouth daily 10/31/22   Keenan Knowles DO   gabapentin (NEURONTIN) 100 MG capsule TAKE 1 CAPSULE BY MOUTH 3 TIMES DAILY FOR NERVES 9/22/22 10/22/22  Kaykay Valenzuela, APRN - CNP   pantoprazole (PROTONIX) 40 MG tablet TAKE 1 TABLET BY MOUTH ONCE DAILY FOR GERD 22   [facility] Nicolas, APRN - CNP   isosorbide mononitrate (IMDUR) 30 MG extended release tablet TAKE 1 TABLET BY MOUTH ONCE DAILY FOR HYPERTENSION 22   Kaykay Valenzuela, APRN - CNP   metoprolol tartrate (LOPRESSOR) 25 MG tablet TAKE ONE HALF (1/2) ATBLETS = 12.5MG BY MOUTH TWICE A DAY FOR HYPERTENSION 22   Kaykay Valenzuela APRN - CNP   carbidopa-levodopa (SINEMET)  MG per tablet TAKE ONE (1) TABLET BY MOUTH FOUR (4) TIMES DAILY 22   Kaykay Valenzuela, APRN - CNP   rOPINIRole (REQUIP) 0.25 MG tablet TAKE ONE (1) TABLET BY MOUTH THREE (3) TIMES DAILY 22   Kaykay Valenzuela, APRN - CNP   atorvastatin (LIPITOR) 40 MG tablet take 1 tablet by mouth once daily 22   Kaykay Valenzuela, APRN - CNP   entacapone (COMTAN) 200 MG tablet TAKE ONE (1) TABLET BY MOUTH FOUR (4) TIMES DAILY WITH SINEMET 22   Lucero Diaz MD   nitroGLYCERIN (NITROSTAT) 0.4 MG SL tablet up to max of 3 total doses.  If no relief after 1 dose, call 911. 22 JW Raya NP   magnesium oxide (MAG-OX) 400 (240 Mg) MG tablet TAKE 1 TABLET BY MOUTH ONCE DAILY 5/23/22   JW Rocha CNP   diclofenac sodium (VOLTAREN) 1 % GEL Apply 2 g topically 2 times daily as needed for Pain (joint pain) 5/9/22   JW Barillas NP   amitriptyline (ELAVIL) 10 MG tablet Take 1 tablet by mouth nightly 4/5/22   Ankit Muniz MD   clopidogrel (PLAVIX) 75 MG tablet TAKE 1 TABLET BY MOUTH ONCE DAILY  11/5/21   JW Nair CNP   allopurinol (ZYLOPRIM) 100 MG tablet TAKE 1 TABLET BY MOUTH ONCE DAILY  11/5/21   JW Nair CNP   acetaminophen (TYLENOL 8 HOUR ARTHRITIS PAIN) 650 MG extended release tablet Take 1 tablet by mouth every 8 hours as needed for Pain 6/14/21   JW Rocha CNP   butalbital-acetaminophen-caffeine (FIORICET, ESGIC) -40 MG per tablet Take 1 tab prn only for severe headache. Can repeat after 4 hrs if needed. Max 2 tabs/24 hrs. Max 4 tabs/week 6/14/21   JW Rocha CNP       Current medications:    No current facility-administered medications for this visit.      Current Outpatient Medications   Medication Sig Dispense Refill    ondansetron (ZOFRAN) 4 MG tablet Take 1 tablet by mouth every 8 hours as needed for Nausea 9 tablet 0     Facility-Administered Medications Ordered in Other Visits   Medication Dose Route Frequency Provider Last Rate Last Admin    [MAR Hold] methyl salicylate-menthol (JOSEPH BRAXTON GREASELESS) 10-15 % cream CREA   Apply externally TID PRN Justin Guevara DO   Given at 01/08/23 0540    [MAR Hold] 0.9 % sodium chloride infusion  1,000 mL IntraVENous Continuous Frances Conway MD 75 mL/hr at 01/08/23 2249 1,000 mL at 01/08/23 2249    [MAR Hold] pantoprazole (PROTONIX) 40 mg in sodium chloride (PF) 0.9 % 10 mL injection  40 mg IntraVENous BID AC Frances Conway MD   40 mg at 01/09/23 0655    [MAR Hold] sodium chloride flush 0.9 % injection 5-40 mL  5-40 mL IntraVENous 2 times per day Jessica Sifuentes MD   10 mL at 01/07/23 0859    [MAR Hold] sodium chloride flush 0.9 % injection 5-40 mL  5-40 mL IntraVENous PRN Toni Roblero MD        [facility] Hold] 0.9 % sodium chloride infusion   IntraVENous PRN Toni Roblero MD        [facility] Hold] enoxaparin (LOVENOX) injection 40 mg  40 mg SubCUTAneous Daily Toni Roblero MD   40 mg at 01/08/23 1018    [MAR Hold] acetaminophen (TYLENOL) tablet 650 mg  650 mg Oral Q4H PRN Toni Roblero MD   650 mg at 01/08/23 0544    [MAR Hold] ondansetron (ZOFRAN-ODT) disintegrating tablet 4 mg  4 mg Oral Q8H PRN MD Mahamed Irvin [facility] Hold] ondansetron [facility]) injection 4 mg  4 mg IntraVENous Q6H PRN Toni Roblero MD   4 mg at 01/08/23 0851    [MAR Hold] allopurinol (ZYLOPRIM) tablet 100 mg  100 mg Oral Daily Toni Roblero MD   100 mg at 01/08/23 1017    [MAR Hold] amitriptyline (ELAVIL) tablet 10 mg  10 mg Oral Nightly Toni Roblero MD   10 mg at 01/08/23 2012    [MAR Hold] atorvastatin (LIPITOR) tablet 40 mg  40 mg Oral Nightly Toni Roblero MD   40 mg at 01/08/23 2012    [MAR Hold] carbidopa-levodopa (SINEMET)  MG per tablet 1 tablet  1 tablet Oral 4x Daily Toni Robleor MD   1 tablet at 01/08/23 2013    [MAR Hold] clopidogrel (PLAVIX) tablet 75 mg  75 mg Oral Daily Toni Roblero MD   75 mg at 01/08/23 1018    [MAR Hold] entacapone (COMTAN) tablet 200 mg  200 mg Oral 4x Daily Toni Roblero MD   200 mg at 01/08/23 2012    [MAR Hold] isosorbide mononitrate (IMDUR) extended release tablet 30 mg  30 mg Oral Daily Toni Roblero MD   30 mg at 01/08/23 1016    [MAR Hold] magnesium oxide (MAG-OX) tablet 400 mg  400 mg Oral Daily Toni Roblero MD   400 mg at 01/08/23 1019    [MAR Hold] rOPINIRole (REQUIP) tablet 0.25 mg  0.25 mg Oral TID Toni Roblero MD   0.25 mg at 01/08/23 2012    [MAR Hold] tamsulosin (FLOMAX) capsule 0.4 mg  0.4 mg Oral Daily Toni Roblero MD   0.4 mg at 01/08/23 1017    [MAR Hold] metoprolol tartrate (LOPRESSOR) tablet 12.5 mg  12.5 mg Oral BID Jona Solano MD   12.5 mg at 01/08/23 1017    [MAR Hold] prochlorperazine (COMPAZINE) injection 10 mg  10 mg IntraVENous Once Jona Solano MD        [facility] Hold] diphenhydrAMINE (BENADRYL) injection 25 mg  25 mg IntraVENous Once Jona Solano MD           Allergies: Allergies   Allergen Reactions    Aspirin      Brain bleed         Problem List:    Patient Active Problem List   Diagnosis Code    Temporary loss of eyesight H53.129    Syncope : posterior circulation stroke vs Neurocardiogenic syncope  R55    Intracranial bleed : traumatic left sub-dural hematoma ,managed conservatively in 2011 I62.9    Headache R51.9    CKD (chronic kidney disease) stage 3, GFR 30-59 ml/min ([facility]) N18.30    Depression F32. A    Hyperlipidemia E78.5    Microhematuria R31.29    Microalbuminuria R80.9    Essential hypertension I10    Generalized abdominal pain R10.84    Tubular adenoma of colon D12.6    Diverticulosis of colon K57.30    History of skull fracture Z87.81    Nausea R11.0    Pure hypercholesterolemia E78.00    Prediabetes R73.03    Parkinson disease ([facility]) G20    Chronic post-traumatic headache, not intractable G44.329    Fall (on) (from) other stairs and steps, initial encounter W10. 8XXA    Strain of iliopsoas muscle, initial encounter S76.919A    Chronic pain of left knee M25.562, G89.29    Closed fracture of second toe of right foot S92.501A    Closed fracture of second toe of left foot S92.502A    Abnormal ECG R94.31    Cerebrovascular accident ([address] I63.9    Chest pain, unspecified R07.9    Hematoma of scalp S00. 03XA    Left ventricular hypertrophy I51.7    Mild aortic valve regurgitation I35.1    Pulmonary hypertension, mild ([facility]) I27.20    Lumbar radiculopathy M54.16    Dizziness R42    Hyponatremia E87.1    Vomiting R11.10    General weakness R53.1    Overweight (BMI 25.0-29. 9) E66.3    Frequent falls R29.6    Symptomatic bradycardia R00.1    Unspecified inflammatory spondylopathy, 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[facility]) M46.96    Stage 3b chronic kidney disease (CKD) ([facility]) N18.32    Chronic pain of multiple joints M25.50, G89.29    Multiple comorbid conditions R69    Gout M10.9    Nerve pain M79.2    NSTEMI (non-ST elevated myocardial infarction) ([facility]) I21.4    History of subdural hematoma Z86.79    Coronary artery disease involving native heart with angina pectoris ([facility]) I25.119    Lumbar facet arthropathy M47.816    Spinal stenosis of lumbar region without neurogenic claudication M48.061    Generalized weakness R53.1    Lumbosacral spondylosis without myelopathy M47.817    Gallstone pancreatitis K85.10    Calculus of gallbladder with acute cholecystitis without obstruction K80.00    Bandemia D72.825    Pulmonary nodule R91.1    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    Acute cholecystitis K81.0    TAMARA (acute kidney injury) ([facility]) N17.9    History of non-ST elevation myocardial infarction (NSTEMI) 6/2022 I25.2    Postoperative retention of urine N99.89, R33.8    History of coronary artery stent placement Z95.5    Nausea & vomiting R11.2    Intractable nausea and vomiting R11.2    Epigastric pain R10.13    Hyperemesis R11.10       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 ([facility] 3/18/2011    d/t being hurt on the job    CKD (chronic kidney disease) stage 2, GFR 60-89 ml/min     CKD (chronic kidney disease) stage 3, GFR 30-59 ml/min ([facility])     Diverticulosis of colon     GERD (gastroesophageal reflux disease)     History of non-ST elevation myocardial infarction (NSTEMI) 6/2022 10/27/2022    Impaired renal function     MRSA (methicillin resistant staph aureus) culture positive 9/23/2015    leg    Osteoarthritis of knee     Left    Parkinson disease ([facility]     Proteinuria     Skull fracture ([facility] 3/18/2011    d/t 12-foot drop on the job    Temporary loss of eyesight     periodically, happened x7    Tubular adenoma of colon 2012, 2017    mulitple       Past Surgical History:        Procedure Laterality Date    CHOLECYSTECTOMY, LAPAROSCOPIC  10/29/2022    LAPROSCOPIC CHOLECYSTECTOMY, POSSIBLE OPEN    CHOLECYSTECTOMY, LAPAROSCOPIC N/A 10/29/2022    LAPROSCOPIC CHOLECYSTECTOMY performed by Daja Boyce DO at [address]  11/02/2012    poor prep, tubular adenoma    COLONOSCOPY  09/19/2017    diverticulosis, multiple polyps as described, spot marking done, pathology-tubular adenoma x 4    MA COLSC FLX W/RMVL OF TUMOR POLYP LESION SNARE TQ N/A 09/19/2017    COLONOSCOPY POLYPECTOMY SNARE/COLD BIOPSY with tatooing and apc transverse colon performed by Tierra Porter MD at [address] Right     rotator cuff       Social History:    Social History     Tobacco Use    Smoking status: Former    Smokeless tobacco: Never   Substance Use Topics    Alcohol use: No                                Counseling given: Not Answered      Vital Signs (Current): There were no vitals filed for this visit.                                            BP Readings from Last 3 Encounters:   01/08/23 (!) 92/55   12/07/22 98/64   11/01/22 128/63       NPO Status:                                                                                 BMI:   Wt Readings from Last 3 Encounters:   01/06/23 180 lb (81.6 kg)   12/04/22 177 lb 7.5 oz (80.5 kg)   11/01/22 178 lb 12.7 oz (81.1 kg)     There is no height or weight on file to calculate BMI.    CBC:   Lab Results   Component Value Date/Time    WBC 4.3 01/09/2023 05:41 AM    RBC 4.08 01/09/2023 05:41 AM    RBC 5.14 02/24/2012 11:00 AM    HGB 13.2 01/09/2023 05:41 AM    HCT 40.0 01/09/2023 05:41 AM    MCV 98.0 01/09/2023 05:41 AM    RDW 14.2 01/09/2023 05:41 AM     01/09/2023 05:41 AM     02/24/2012 11:00 AM       CMP:   Lab Results   Component Value Date/Time     01/09/2023 05:41 AM    K 4.4 01/09/2023 05:41 AM     01/09/2023 05:41 AM    CO2 21 01/09/2023 05:41 AM    BUN 7 01/09/2023 05:41 AM    CREATININE 1.15 01/09/2023 05:41 AM    GFRAA 59 07/29/2022 10:30 AM    LABGLOM >60 01/09/2023 05:41 AM    GLUCOSE 108 01/09/2023 05:41 AM    GLUCOSE 97 04/24/2012 12:55 PM    PROT 7.3 01/06/2023 07:07 PM    CALCIUM 8.8 01/09/2023 05:41 AM    BILITOT 0.3 01/06/2023 07:07 PM    ALKPHOS 124 01/06/2023 07:07 PM    AST 23 01/06/2023 07:07 PM    ALT 28 01/06/2023 07:07 PM       POC Tests:   No results for input(s): POCGLU, POCNA, POCK, POCCL, POCBUN, POCHEMO, POCHCT in the last 72 hours.     Coags:   Lab Results   Component Value Date/Time    PROTIME 10.7 01/06/2023 07:07 PM    INR 1.0 01/06/2023 07:07 PM    APTT 24.4 01/06/2023 07:07 PM       HCG (If Applicable): No results found for: PREGTESTUR, PREGSERUM, HCG, HCGQUANT     ABGs: No results found for: PHART, PO2ART, TGV3SKA, WXH9AOG, BEART, B2FHBECQ     Type & Screen (If Applicable):  No results found for: LABABO, LABRH    Drug/Infectious Status (If Applicable):  Lab Results   Component Value Date/Time    HEPCAB NONREACTIVE 07/30/2012 12:10 PM       COVID-19 Screening (If Applicable):   Lab Results   Component Value Date/Time    COVID19 Not Detected 01/06/2023 11:00 PM           Anesthesia Evaluation  Patient summary reviewed and Nursing notes reviewed no history of anesthetic complications:   Airway: Mallampati: IV  TM distance: >3 FB   Neck ROM: limited  Mouth opening: > = 3 FB   Dental: normal exam   (+) edentulous      Pulmonary:Negative Pulmonary ROS and normal exam  breath sounds clear to auscultation                             Cardiovascular:    (+) hypertension:, angina:, past MI:, CAD:, hyperlipidemia        Rhythm: regular  Rate: normal                    Neuro/Psych:   (+) CVA:, neuromuscular disease: Parkinson's disease, headaches:, psychiatric history:            GI/Hepatic/Renal:   (+) GERD:, renal disease: CRI,           Endo/Other: Negative Endo/Other ROS   (+) : arthritis:., .                  ROS comment: gout Abdominal:             Vascular: Other Findings:             Anesthesia Plan      MAC     ASA 3       Induction: intravenous. MIPS: Postoperative opioids intended and Prophylactic antiemetics administered. Anesthetic plan and risks discussed with patient. Plan discussed with CRNA.                     Chiqui Angel MD   1/9/2023

## 2023-01-09 NOTE — CARE COORDINATION
Cristina from [facility] states that they can accept the patient if he pays the balance of 1600.00 for which he owes from a previous visit. Patient is notified and states that he will pay the balance with a check and will have his check book brought to him by family. Updated PT/OT notes are requested.

## 2023-01-09 NOTE — PROGRESS NOTES
OBS/CDU   RESIDENT NOTE      Patients PCP is:  Lolita Blankenship, JW - [facility]        SUBJECTIVE      Patient is tired this morning however he did just return from his EGD. PHYSICAL EXAM      General: NAD, AO X 3  Heent: EMOI, PERRL  Neck: SUPPLE, NO JVD  Cardiovascular: RRR, S1S2  Pulmonary: CTAB, NO SOB  Abdomen: SOFT, NTTP, ND, +BS  Extremities: +2/4 PULSES DISTAL, NO SWELLING  Neuro / Psych: NO NUMBNESS OR TINGLING, MENTATION AT BASELINE    PERTINENT TEST /EXAMS      I have reviewed all available laboratory results. MEDICATIONS CURRENT   sucralfate (CARAFATE) tablet 1 g, 4 times per day  methyl salicylate-menthol (JOSEPH BRAXTON GREASELESS) 10-15 % cream CREA, TID PRN  0.9 % sodium chloride infusion, Continuous  pantoprazole (PROTONIX) 40 mg in sodium chloride (PF) 0.9 % 10 mL injection, BID AC  sodium chloride flush 0.9 % injection 5-40 mL, 2 times per day  sodium chloride flush 0.9 % injection 5-40 mL, PRN  enoxaparin (LOVENOX) injection 40 mg, Daily  ondansetron (ZOFRAN-ODT) disintegrating tablet 4 mg, Q8H PRN   Or  ondansetron (ZOFRAN) injection 4 mg, Q6H PRN  allopurinol (ZYLOPRIM) tablet 100 mg, Daily  amitriptyline (ELAVIL) tablet 10 mg, Nightly  atorvastatin (LIPITOR) tablet 40 mg, Nightly  carbidopa-levodopa (SINEMET)  MG per tablet 1 tablet, 4x Daily  clopidogrel (PLAVIX) tablet 75 mg, Daily  entacapone (COMTAN) tablet 200 mg, 4x Daily  isosorbide mononitrate (IMDUR) extended release tablet 30 mg, Daily  magnesium oxide (MAG-OX) tablet 400 mg, Daily  rOPINIRole (REQUIP) tablet 0.25 mg, TID  tamsulosin (FLOMAX) capsule 0.4 mg, Daily  metoprolol tartrate (LOPRESSOR) tablet 12.5 mg, BID  prochlorperazine (COMPAZINE) injection 10 mg, Once  diphenhydrAMINE (BENADRYL) injection 25 mg, Once        All medication charted and reviewed.     CONSULTS      IP CONSULT TO CASE MANAGEMENT  IP CONSULT TO GI    ASSESSMENT/PLAN       Stone Landeros is a 79 y.o. male who presents with      Persistent nausea and vomiting  Protonix twice daily for 8 weeks and then once daily indefinitely. Carafate 4 times daily  Antiemetics  Maintenance IV fluids 75 mL/h  EGD performed by GI today showed esophageal ulcerations and severe grade D esophagitis  Continue home medications and pain control  Monitor vitals, labs, and imaging     DISPO: pending ability to tolerate oral intake. --  Jacquelyn Crawley MD  Emergency Medicine Resident Physician     This dictation was generated by voice recognition computer software. Although all attempts are made to edit the dictation for accuracy, there may be errors in the transcription that are not intended.

## 2023-01-09 NOTE — PROGRESS NOTES
Physician Progress Note      Nany Castillo  CSN #:                  [id number]  :                       1940  ADMIT DATE:       2023 6:42 PM  [address] [place] DATE:  RESPONDING  PROVIDER #:        Rome Headley MD          QUERY TEXT:    Pt admitted with nausea and vomiting. 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 on   by GI consultant. If possible, please   document in progress notes and discharge summary:    The medical record reflects the following:  Risk Factors: Patient had a cholecystectomy in 2022  Clinical Indicators:  still having nausea and vomiting even with antiemetics. Unable to tolerate clear liquid diet. EGD1/9; Esophagus: Severe grade D   reflux esophagitis with esophageal ulcerations. Stomach: Small sliding hiatal   hernia. 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 ruled out  -- Defer to GI consultant documentation regarding likely postcholecystectomy   syndrome. -- Other - I will add my own diagnosis  -- Disagree - Not applicable / Not valid  -- Disagree - Clinically unable to determine / Unknown  -- Refer to Clinical Documentation Reviewer    PROVIDER RESPONSE TEXT:    I defer to GI consultant regarding documentation of GI.     Query created by: Deedee Rodríguez on 2023 10:32 AM      Electronically signed by:  Rome Headley MD 2023 11:57 AM

## 2023-01-10 PROCEDURE — 1200000000 HC SEMI PRIVATE

## 2023-01-10 PROCEDURE — C9113 INJ PANTOPRAZOLE SODIUM, VIA: HCPCS | Performed by: INTERNAL MEDICINE

## 2023-01-10 PROCEDURE — 2580000003 HC RX 258: Performed by: INTERNAL MEDICINE

## 2023-01-10 PROCEDURE — 6360000002 HC RX W HCPCS: Performed by: INTERNAL MEDICINE

## 2023-01-10 PROCEDURE — 6370000000 HC RX 637 (ALT 250 FOR IP): Performed by: EMERGENCY MEDICINE

## 2023-01-10 PROCEDURE — 6370000000 HC RX 637 (ALT 250 FOR IP): Performed by: INTERNAL MEDICINE

## 2023-01-10 PROCEDURE — APPSS30 APP SPLIT SHARED TIME 16-30 MINUTES: Performed by: INTERNAL MEDICINE

## 2023-01-10 PROCEDURE — 99232 SBSQ HOSP IP/OBS MODERATE 35: CPT | Performed by: INTERNAL MEDICINE

## 2023-01-10 PROCEDURE — 97110 THERAPEUTIC EXERCISES: CPT

## 2023-01-10 PROCEDURE — 97116 GAIT TRAINING THERAPY: CPT

## 2023-01-10 RX ORDER — ACETAMINOPHEN 500 MG
1000 TABLET ORAL EVERY 8 HOURS PRN
Status: DISCONTINUED | OUTPATIENT
Start: 2023-01-10 | End: 2023-01-13 | Stop reason: HOSPADM

## 2023-01-10 RX ORDER — PANTOPRAZOLE SODIUM 40 MG/1
40 TABLET, DELAYED RELEASE ORAL
Status: DISCONTINUED | OUTPATIENT
Start: 2023-01-10 | End: 2023-01-13 | Stop reason: HOSPADM

## 2023-01-10 RX ADMIN — METOPROLOL TARTRATE 12.5 MG: 25 TABLET ORAL at 08:34

## 2023-01-10 RX ADMIN — ALLOPURINOL 100 MG: 100 TABLET ORAL at 08:34

## 2023-01-10 RX ADMIN — ONDANSETRON 4 MG: 4 TABLET, ORALLY DISINTEGRATING ORAL at 20:10

## 2023-01-10 RX ADMIN — ISOSORBIDE MONONITRATE 30 MG: 30 TABLET ORAL at 08:34

## 2023-01-10 RX ADMIN — AMITRIPTYLINE HYDROCHLORIDE 10 MG: 10 TABLET, FILM COATED ORAL at 20:02

## 2023-01-10 RX ADMIN — SODIUM CHLORIDE, PRESERVATIVE FREE 40 MG: 5 INJECTION INTRAVENOUS at 08:37

## 2023-01-10 RX ADMIN — CARBIDOPA AND LEVODOPA 1 TABLET: 25; 100 TABLET ORAL at 08:34

## 2023-01-10 RX ADMIN — SODIUM CHLORIDE, PRESERVATIVE FREE 10 ML: 5 INJECTION INTRAVENOUS at 20:03

## 2023-01-10 RX ADMIN — SUCRALFATE 1 G: 1 TABLET ORAL at 06:43

## 2023-01-10 RX ADMIN — TAMSULOSIN HYDROCHLORIDE 0.4 MG: 0.4 CAPSULE ORAL at 08:33

## 2023-01-10 RX ADMIN — ONDANSETRON 4 MG: 4 TABLET, ORALLY DISINTEGRATING ORAL at 10:50

## 2023-01-10 RX ADMIN — CARBIDOPA AND LEVODOPA 1 TABLET: 25; 100 TABLET ORAL at 18:56

## 2023-01-10 RX ADMIN — SUCRALFATE 1 G: 1 TABLET ORAL at 13:11

## 2023-01-10 RX ADMIN — PANTOPRAZOLE SODIUM 40 MG: 40 TABLET, DELAYED RELEASE ORAL at 18:59

## 2023-01-10 RX ADMIN — ENTACAPONE 200 MG: 200 TABLET, FILM COATED ORAL at 13:11

## 2023-01-10 RX ADMIN — ENOXAPARIN SODIUM 40 MG: 100 INJECTION SUBCUTANEOUS at 08:40

## 2023-01-10 RX ADMIN — ROPINIROLE HYDROCHLORIDE 0.25 MG: 0.25 TABLET, FILM COATED ORAL at 20:02

## 2023-01-10 RX ADMIN — SUCRALFATE 1 G: 1 TABLET ORAL at 18:56

## 2023-01-10 RX ADMIN — ROPINIROLE HYDROCHLORIDE 0.25 MG: 0.25 TABLET, FILM COATED ORAL at 18:56

## 2023-01-10 RX ADMIN — CARBIDOPA AND LEVODOPA 1 TABLET: 25; 100 TABLET ORAL at 13:11

## 2023-01-10 RX ADMIN — ENTACAPONE 200 MG: 200 TABLET, FILM COATED ORAL at 08:34

## 2023-01-10 RX ADMIN — METOPROLOL TARTRATE 12.5 MG: 25 TABLET ORAL at 20:02

## 2023-01-10 RX ADMIN — CLOPIDOGREL 75 MG: 75 TABLET, FILM COATED ORAL at 08:34

## 2023-01-10 RX ADMIN — DESMOPRESSIN ACETATE 40 MG: 0.2 TABLET ORAL at 20:02

## 2023-01-10 RX ADMIN — ROPINIROLE HYDROCHLORIDE 0.25 MG: 0.25 TABLET, FILM COATED ORAL at 08:34

## 2023-01-10 RX ADMIN — ENTACAPONE 200 MG: 200 TABLET, FILM COATED ORAL at 18:56

## 2023-01-10 ASSESSMENT — PAIN SCALES - GENERAL: PAINLEVEL_OUTOF10: 0

## 2023-01-10 NOTE — PROGRESS NOTES
[facility]  CDU / OBSERVATION ENCOUNTER  ATTENDING NOTE       I performed a history and physical examination of the patient and discussed management with the resident or midlevel provider. I reviewed the resident or midlevel provider's note and agree with the documented findings and plan of care. Any areas of disagreement are noted on the chart. I was personally present for the key portions of any procedures. I have documented in the chart those procedures where I was not present during the key portions. I have reviewed the nurses notes. I agree with the chief complaint, past medical history, past surgical history, allergies, medications, social and family history as documented unless otherwise noted below. The Family history, social history, and ROS are effectively unchanged since admission unless noted elsewhere in the chart. Patient with complaints of nausea vomiting which has been persistent for the past 6 weeks. EGD was done yesterday. Significant gastritis seen. Patient on PPI and Carafate. Patient has been kept for oral challenge to pass. Patient currently no longer vomiting. Patient with persistent nausea but no vomiting. Patient will still need continued reassessment.   Patient improved but not yet ready for discharge    Warner Landau MD  Attending Emergency  Physician

## 2023-01-10 NOTE — PROGRESS NOTES
Bhavesh Gillespie. [facility] Gastroenterology Progress Note    Julio C Avendano is a 79 y.o. male patient. Hospitalization Day:2      Chief consult reason: N/V      Subjective: Patient seen and examined. Patient reports he was able to tolerate breakfast this morning. He reports intermittent nausea. Discussed with staff as well who reports patient has been tolerating diet. Objective:   VITALS:  /71   Pulse 64   Temp 98.2 °F (36.8 °C) (Oral)   Resp 16   Ht 5' 7\" (1.702 m)   Wt 180 lb (81.6 kg)   SpO2 96%   BMI 28.19 kg/m²   TEMPERATURE:  Current - Temp: 98.2 °F (36.8 °C);  Max - Temp  Av °F (36.7 °C)  Min: 97.5 °F (36.4 °C)  Max: 98.6 °F (37 °C)    Physical Assessment:  General appearance:  alert, cooperative and no distress  Mental Status:  oriented to person, place and time and normal affect  Lungs:  clear to auscultation bilaterally, normal effort  Heart:  regular rate and rhythm, no murmur  Abdomen:  soft, nontender, nondistended, normal bowel sounds  Skin:  warm, dry, no gross lesions or rashes    CURRENT MEDICATIONS:  Scheduled Meds:   sucralfate  1 g Oral 4 times per day    pantoprazole (PROTONIX) 40 mg injection  40 mg IntraVENous BID AC    sodium chloride flush  5-40 mL IntraVENous 2 times per day    enoxaparin  40 mg SubCUTAneous Daily    allopurinol  100 mg Oral Daily    amitriptyline  10 mg Oral Nightly    atorvastatin  40 mg Oral Nightly    carbidopa-levodopa  1 tablet Oral 4x Daily    clopidogrel  75 mg Oral Daily    entacapone  200 mg Oral 4x Daily    isosorbide mononitrate  30 mg Oral Daily    magnesium oxide  400 mg Oral Daily    rOPINIRole  0.25 mg Oral TID    tamsulosin  0.4 mg Oral Daily    metoprolol tartrate  12.5 mg Oral BID    prochlorperazine  10 mg IntraVENous Once    diphenhydrAMINE  25 mg IntraVENous Once     Continuous Infusions:  PRN Meds:methyl salicylate-menthol, sodium chloride flush, ondansetron **OR** ondansetron      Data Review:  LABS and IMAGING:     CBC  Recent Labs 01/08/23  0555 01/09/23  0541   WBC 5.0 4.3   HGB 13.0 13.2   HCT 39.4* 40.0*   MCV 96.8 98.0   MCHC 33.0 33.0   RDW 14.1 14.2    188       Immature PLTs   Lab Results   Component Value Date/Time    PLTFLUORE 160 08/12/2021 03:58 AM       ANEMIA STUDIES  No results for input(s): LABIRON, TIBC, IRON, FERRITIN, KNAWBUME16, FOLATE, OCCULTBLD in the last 72 hours. BMP  Recent Labs     01/08/23  0555 01/09/23  0541    137   K 4.2 4.4    108*   CO2 26 21   BUN 10 7*   CREATININE 1.16 1.15   GLUCOSE 94 108*   CALCIUM 8.3* 8.8       LFTS  No results for input(s): ALKPHOS, ALT, AST, BILITOT, BILIDIR, LABALBU in the last 72 hours. AMYLASE/LIPASE/AMMONIA  No results for input(s): AMYLASE, LIPASE, AMMONIA in the last 72 hours. Acute Hepatitis Panel   Lab Results   Component Value Date/Time    HEPBSAG NONREACTIVE 07/30/2012 12:10 PM    HEPCAB NONREACTIVE 07/30/2012 12:10 PM    HEPBIGM NONREACTIVE 07/30/2012 12:10 PM    HEPAIGM NONREACTIVE 07/30/2012 12:10 PM       HCV Genotype   No results found for: HEPATITISCGENOTYPE    HCV Quantitative   No results found for: HCVQNT    LIVER WORK UP:    AFP  No results found for: AFP    Alpha 1 antitrypsin   No results found for: A1A    Anti - Liver/Kidney Ab  No results found for: LIVER-KIDNEYMICROSOMALAB    JONAH  Lab Results   Component Value Date/Time    JONAH NEGATIVE 06/28/2021 12:05 PM       AMA  No results found for: MITOAB    ASMA  No results found for: SMOOTHMUSCAB    Ceruloplasmin  No results found for: CERULOPLSM    Celiac panel  No results found for: TISSTRNTIIGG, TTGIGA, IGA    IgG  No results found for: IGG    IgM  No results found for: IGM    GGT   No results found for: LABGGT    PT/INR  No results for input(s): PROTIME, INR in the last 72 hours.     Cancer Markers:  CEA:  No results found for: CEA  Ca 125:  No results found for:   Ca 19-9:   No results found for:   AFP: No results found for: AFP    Lactic acid:No results for input(s): LACTACIDWB in the last 72 hours. Radiology Review:    CT HEAD WO CONTRAST    Result Date: 1/7/2023  EXAMINATION: CT OF THE HEAD WITHOUT CONTRAST  1/7/2023 1:46 pm TECHNIQUE: CT of the head was performed without the administration of intravenous contrast. Automated exposure control, iterative reconstruction, and/or weight based adjustment of the mA/kV was utilized to reduce the radiation dose to as low as reasonably achievable. COMPARISON: CT brain 08/13/2021 HISTORY: ORDERING SYSTEM PROVIDED HISTORY: Recurrent vomiting. R/o mass / increased ICP TECHNOLOGIST PROVIDED HISTORY: Recurrent vomiting. R/o mass / increased ICP Reason for Exam: Recurrent vomiting. R/o mass / increased ICP FINDINGS: BRAIN/VENTRICLES: There is no acute infarct or acute intracranial hemorrhage present. There is no mass effect or midline shift present. There is mild diffuse cerebral volume loss. There is mild periventricular hypoattenuation. There is no ventriculomegaly or abnormal extra-axial fluid collection present. ORBITS: Limited evaluation of the orbits is unremarkable. SINUSES: The paranasal sinuses and mastoid air cells are clear. SOFT TISSUES/SKULL:  No lytic or blastic osseous lesions are identified. 1. No acute intracranial process identified. 2. Mild diffuse cerebral volume loss and mild chronic small vessel ischemic changes. CT ABDOMEN PELVIS W IV CONTRAST Additional Contrast? None    Result Date: 1/6/2023  EXAMINATION: CT OF THE ABDOMEN AND PELVIS WITH CONTRAST 1/6/2023 9:58 pm TECHNIQUE: CT of the abdomen and pelvis was performed with the administration of intravenous contrast. Multiplanar reformatted images are provided for review. Automated exposure control, iterative reconstruction, and/or weight based adjustment of the mA/kV was utilized to reduce the radiation dose to as low as reasonably achievable.  COMPARISON: CT of 11/29/2022 HISTORY: ORDERING SYSTEM PROVIDED HISTORY: ill defined fluid collection post op from cholecystectomy october 2022, now with epigastric pain and vomiting, blood tinged TECHNOLOGIST PROVIDED HISTORY: ill defined fluid collection post op from cholecystectomy october 2022, now with epigastric pain and vomiting, blood tinged Decision Support Exception - unselect if not a suspected or confirmed emergency medical condition->Emergency Medical Condition (MA) Reason for Exam: ill defined fluid collection post op from cholecystectomy october 2022, now with epigastric pain and vomiting, blood tinged FINDINGS: LOWER CHEST: 1 cm tubular structure within the right lower lobe which appears to be an impacted bronchus, similar to prior CT examination of 06/27/2022. Visualized portion of the lower chest demonstrates no acute abnormality. Small axial hiatal hernia containing part of the stomach. KIDNEYS AND URINARY TRACT: No renal calculi are identified. There is no evidence for hydronephrosis. The ureters are of normal course and caliber. ORGANS: Status post cholecystectomy with interval decrease in fluid density within the gallbladder fossa now measuring 1.7 x 2.6 cm in comparison with prior examination when it measured 3.1 x 3 cm. Findings are likely suggestive of postsurgical seroma/hematoma which is gradually resolving. Visualized portions of the liver, spleen, pancreas,  and adrenal glands demonstrate no acute abnormality. GI/BOWEL: No bowel obstruction. No evidence of acute appendicitis. Diverticulosis with no evidence of diverticulitis. PELVIS: The bladder and pelvic organs are unremarkable. PERITONEUM/RETROPERITONEUM: No free air or free fluid is noted. No pathologically enlarged lymphadenopathy. The vasculature do not demonstrate acute abnormality. BONES/SOFT TISSUES: The osseous structures demonstrate no acute abnormality. Multilevel disc and facet degenerative disease. At L4-L5, grade 1 anterolisthesis. Appendix is normal.  No obstructive uropathy.  Status post cholecystectomy with interval decrease in fluid density within the gallbladder fossa now measuring 1.7 x 2.6 cm in comparison with prior examination when it measured 3.1 x 3 cm. Findings are likely suggestive of postsurgical seroma/hematoma which is gradually resolving. Diverticulosis with no evidence of diverticulitis. Small axial hiatal hernia containing part of the stomach. 1 cm tubular structure within the right lower lobe which appears to be an impacted bronchus, similar to prior CT examination of 06/27/2022. XR CHEST PORTABLE    Result Date: 1/6/2023  EXAMINATION: ONE XRAY VIEW OF THE CHEST 1/6/2023 7:15 pm COMPARISON: 11/28/2022. HISTORY: ORDERING SYSTEM PROVIDED HISTORY: blood tinged emesis TECHNOLOGIST PROVIDED HISTORY: blood tinged emesis FINDINGS: Cardiomediastinal silhouette appears within normal limits. No focal consolidation or overt pulmonary edema. Costophrenic angles are sharp. No evidence of pneumothorax. No acute osseous abnormalities. No radiographic evidence of an acute cardiopulmonary process. ENDOSCOPY     Procedure:                 Esophagogastroduodenoscopy --diagnostic  Date:                           1/9/2023   Endoscopist:             Jem Marcano MD, [facility]     Indication:    Nausea, vomiting, epigastric pain    Findings[de-identified]   Esophagus: Severe grade D reflux esophagitis with esophageal ulcerations. Esophagus otherwise normal.   Stomach: Small sliding hiatal hernia. Stomach otherwise normal.   Duodenum: normal     Recommendations: PPI BID for 8 weeks and then daily indefinitely. Antireflux diet. OK to discharge from GI standpoint. Electronically signed by Jem Marcano MD, FAC  on 1/9/2023 at 9:02 AM      Principal Problem:    Nausea & vomiting  Active Problems:    Epigastric pain    Hyperemesis  Resolved Problems:    * No resolved hospital problems. *       GI Assessment:    Nausea and vomiting -intermittent nausea persist, vomiting resolved.   Possibly secondary to esophagitis versus postcholecystectomy syndrome  Epigastric abdominal pain-secondary to severe grade D reflux esophagitis with esophageal ulceration as noted on EGD      Plan of care:  1. Change Protonix to p.o. Continue Protonix 40 mg twice daily for 8 weeks then daily indefinitely  2. Antireflux diet  3. Antiemetics as needed  4. Recommend small frequent meals-discussed with patient  5. GI will sign off      Time spent reviewing chart, seeing patient, and discussing with attending and patient's nurse Angy RN: Around 30 minutes    This plan was formulated in collaboration with Dr. Josef Tello  Please feel free to contact me with any questions or concerns. Thank you for allowing me to participate in the care of your patient. JW Rosenberg - CNP on 1/10/2023 at 6:39 AM   THE [facility] Gastroenterology    Please note that this note was generated using a voice recognition dictation software. Although every effort was made to ensure the accuracy of this automated transcription, some errors in transcription may have occurred.

## 2023-01-10 NOTE — PROGRESS NOTES
Physical Therapy  Facility/Department: [address] OBSERVATION  Physical Therapy Daily Treatment Note    Name: Jonh Anderson  : 1940  MRN: [id number]  Date of Service: 1/10/2023    Discharge Recommendations:  Patient would benefit from continued therapy after discharge   PT Equipment Recommendations  Equipment Needed: No (Pt reports owning a RW, writer recommends  use of RW)      Patient Diagnosis(es): The encounter diagnosis was Nausea and vomiting, unspecified vomiting type. 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 Past Surgical History:  has a past surgical history that includes Colonoscopy (2012); shoulder surgery (Right); pr colsc flx w/rmvl of tumor polyp lesion snare tq (N/A, 2017); Colonoscopy (2017); Cholecystectomy, laparoscopic (10/29/2022); Cholecystectomy, laparoscopic (N/A, 10/29/2022); and Esophagogastroduodenoscopy (2023). Assessment   Body Structures, Functions, Activity Limitations Requiring Skilled Therapeutic Intervention: Decreased functional mobility ; Decreased strength;Decreased endurance;Decreased balance; Increased pain;Decreased coordination  Assessment: Pt with mobility deficits requiring CGA to ambulate 40 feet with a RW. Pt most limited this date secondary to increased L knee pain with mobility, as well as impaired standing balance.   Pt would benefit from additional PT upon discharge to assist in return to independent PLOF.  Pt will require 24 hour assistance with mobility upon discharge. Therapy Prognosis: Good  Decision Making: Medium Complexity  Requires PT Follow-Up: Yes  Activity Tolerance  Activity Tolerance: Patient tolerated treatment well;Patient limited by pain     Plan   Physcial Therapy Plan  General Plan:  (5-6x/week)  Current Treatment Recommendations: Strengthening, Balance training, Functional mobility training, Transfer training, Endurance training, Gait training, Stair training, Home exercise program, Safety education & training, Patient/Caregiver education & training, Therapeutic activities  Safety Devices  Type of Devices: Gait belt, Nurse notified, Left in bed, Call light within reach  Restraints  Restraints Initially in Place: No     Restrictions  Restrictions/Precautions  Restrictions/Precautions: Fall Risk  Required Braces or Orthoses?: No  Required Braces or Orthoses  Left Lower Extremity Brace: Knee Brace (personal knee brace)  Position Activity Restriction  Other position/activity restrictions: up with assist, hx of Parkinson's     Subjective   General  Patient assessed for rehabilitation services?: Yes  Response To Previous Treatment: Patient with no complaints from previous session. Family / Caregiver Present: No  Follows Commands: Within Functional Limits  Subjective  Subjective: Pt supine in bed and agreeable to therapy, RN agreeable to therapy. Pt pleasant and cooperative throughout. Pt reports 7/10 L knee pain with mobility. Social/Functional History     Cognition   Cognition  Overall Cognitive Status: WFL     Objective                                Bed mobility  Supine to Sit: Stand by assistance  Sit to Supine: Stand by assistance  Scooting: Stand by assistance  Bed Mobility Comments: HOB elevated, utilizing bed rails  Transfers  Sit to Stand: Stand by assistance  Stand to Sit: Stand by assistance  Comment: Increased effort to perform.  Transfers performed 2x this date.  Ambulation  Surface: Level tile  Device: Rolling Walker  Assistance: Contact guard assistance  Quality of Gait: fair stability, decreased stride length, decreased gait speed, no LOB. Gait Deviations: Decreased step length;Decreased step height  Distance: 40 feet  More Ambulation?: Yes  Ambulation 2  Surface - 2: level tile  Device 2: Single point cane  Assistance 2: Contact guard assistance  Quality of Gait 2: mildly unsteady, decreased gait speed, minor L sided antalgia. Gait Deviations: Slow [place];Decreased step length;Decreased step height  Distance: 25 feet  Stairs/Curb  Stairs?: No     Balance  Posture: Fair  Sitting - Static: Good  Sitting - Dynamic: Good;-  Standing - Static: Fair;+  Standing - Dynamic: Fair  Comments: standing balance assessed while using a RW  Exercise Treatment: x10 BLE in sitting: hip flexion, LAQ, hip abduction, heel raises. AM-PAC Score  AM-PAC Inpatient Mobility Raw Score : 18 (01/10/23 1449)  AM-PAC Inpatient T-Scale Score : 43.63 (01/10/23 1449)  Mobility Inpatient CMS 0-100% Score: 46.58 (01/10/23 1449)  Mobility Inpatient CMS G-Code Modifier : CK (01/10/23 1449)            Goals  Short Term Goals  Time Frame for Short Term Goals: 10 visits  Short Term Goal 1: Pt to demonstrate safe and independent bed mobility  Short Term Goal 2: Transfers at supervision level. Short Term Goal 3: Ambulate with least restricitive device distance of 80 ft x 2, CGA  Short Term Goal 4: Pt able to perfrom 6\" step upsx 5 to 8 reps, to imporve balance and strength. Short Term Goal 5: Tolerate activity for 30 minutes to improve strength and mobility  Additional Goals?: No  Patient Goals   Patient Goals : Get stronger       Education  Patient Education  Education Given To: Patient  Education Provided: Role of Therapy;Transfer Training;Plan of Care; Fall Prevention Strategies  Education Method: Verbal  Barriers to Learning: None  Education Outcome: Verbalized understanding      Therapy Time   Individual Concurrent Group Co-treatment   Time In 1000         Time Out 1028         Minutes 28         Timed Code Treatment Minutes: [address], PT

## 2023-01-10 NOTE — PROGRESS NOTES
OBS/CDU   RESIDENT NOTE      Patients PCP is:  Verónica Albarran, APRN - CNP        SUBJECTIVE      Patient was able to eat applesauce, a pancake, and some coffee this morning. Patient did not vomit however he does say that he feels very nauseous. PHYSICAL EXAM      General: NAD, AO X 3  Heent: EMOI, PERRL  Neck: SUPPLE, NO JVD  Cardiovascular: RRR, S1S2  Pulmonary: CTAB, NO SOB  Abdomen: SOFT, NTTP, ND, +BS  Extremities: +2/4 PULSES DISTAL, NO SWELLING  Neuro / Psych: NO NUMBNESS OR TINGLING, MENTATION AT BASELINE    PERTINENT TEST /EXAMS      I have reviewed all available laboratory results. MEDICATIONS CURRENT   pantoprazole (PROTONIX) tablet 40 mg, BID AC  sucralfate (CARAFATE) tablet 1 g, 4 times per day  methyl salicylate-menthol (JOSEPH BRAXTON GREASELESS) 10-15 % cream CREA, TID PRN  sodium chloride flush 0.9 % injection 5-40 mL, 2 times per day  sodium chloride flush 0.9 % injection 5-40 mL, PRN  enoxaparin (LOVENOX) injection 40 mg, Daily  ondansetron (ZOFRAN-ODT) disintegrating tablet 4 mg, Q8H PRN   Or  ondansetron (ZOFRAN) injection 4 mg, Q6H PRN  allopurinol (ZYLOPRIM) tablet 100 mg, Daily  amitriptyline (ELAVIL) tablet 10 mg, Nightly  atorvastatin (LIPITOR) tablet 40 mg, Nightly  carbidopa-levodopa (SINEMET)  MG per tablet 1 tablet, 4x Daily  clopidogrel (PLAVIX) tablet 75 mg, Daily  entacapone (COMTAN) tablet 200 mg, 4x Daily  isosorbide mononitrate (IMDUR) extended release tablet 30 mg, Daily  magnesium oxide (MAG-OX) tablet 400 mg, Daily  rOPINIRole (REQUIP) tablet 0.25 mg, TID  tamsulosin (FLOMAX) capsule 0.4 mg, Daily  metoprolol tartrate (LOPRESSOR) tablet 12.5 mg, BID  prochlorperazine (COMPAZINE) injection 10 mg, Once  diphenhydrAMINE (BENADRYL) injection 25 mg, Once        All medication charted and reviewed.     CONSULTS      IP CONSULT TO CASE MANAGEMENT  IP CONSULT TO GI    ASSESSMENT/PLAN       Kamran Crook is a 79 y.o. male who presents with      Persistent nausea and vomiting  Protonix twice daily for 8 weeks and then once daily indefinitely. Carafate 4 times daily  Antiemetics  EGD performed by GI on 1/9/23 showed esophageal ulcerations and severe grade D esophagitis    Continue home medications and pain control  Monitor vitals, labs, and imaging     DISPO: pending ability to tolerate oral intake. --  Vanessa Harris MD  Emergency Medicine Resident Physician     This dictation was generated by voice recognition computer software. Although all attempts are made to edit the dictation for accuracy, there may be errors in the transcription that are not intended.

## 2023-01-10 NOTE — CARE COORDINATION
Left  for admissions at [facility] to inquire on precert, requested return call    [phone] spoke with Alonzo Youngblood at [facility], awaiting precert

## 2023-01-11 PROCEDURE — 1200000000 HC SEMI PRIVATE

## 2023-01-11 PROCEDURE — 6370000000 HC RX 637 (ALT 250 FOR IP)

## 2023-01-11 PROCEDURE — 6370000000 HC RX 637 (ALT 250 FOR IP): Performed by: INTERNAL MEDICINE

## 2023-01-11 PROCEDURE — 6370000000 HC RX 637 (ALT 250 FOR IP): Performed by: EMERGENCY MEDICINE

## 2023-01-11 PROCEDURE — 6360000002 HC RX W HCPCS: Performed by: INTERNAL MEDICINE

## 2023-01-11 PROCEDURE — 2580000003 HC RX 258: Performed by: INTERNAL MEDICINE

## 2023-01-11 RX ADMIN — PANTOPRAZOLE SODIUM 40 MG: 40 TABLET, DELAYED RELEASE ORAL at 09:16

## 2023-01-11 RX ADMIN — ONDANSETRON 4 MG: 4 TABLET, ORALLY DISINTEGRATING ORAL at 09:17

## 2023-01-11 RX ADMIN — SUCRALFATE 1 G: 1 TABLET ORAL at 01:17

## 2023-01-11 RX ADMIN — ROPINIROLE HYDROCHLORIDE 0.25 MG: 0.25 TABLET, FILM COATED ORAL at 20:40

## 2023-01-11 RX ADMIN — ONDANSETRON 4 MG: 4 TABLET, ORALLY DISINTEGRATING ORAL at 17:14

## 2023-01-11 RX ADMIN — TAMSULOSIN HYDROCHLORIDE 0.4 MG: 0.4 CAPSULE ORAL at 09:19

## 2023-01-11 RX ADMIN — AMITRIPTYLINE HYDROCHLORIDE 10 MG: 10 TABLET, FILM COATED ORAL at 20:40

## 2023-01-11 RX ADMIN — METOPROLOL TARTRATE 12.5 MG: 25 TABLET ORAL at 09:17

## 2023-01-11 RX ADMIN — CLOPIDOGREL 75 MG: 75 TABLET, FILM COATED ORAL at 09:17

## 2023-01-11 RX ADMIN — ENOXAPARIN SODIUM 40 MG: 100 INJECTION SUBCUTANEOUS at 09:20

## 2023-01-11 RX ADMIN — ACETAMINOPHEN 1000 MG: 500 TABLET ORAL at 17:14

## 2023-01-11 RX ADMIN — ENTACAPONE 200 MG: 200 TABLET, FILM COATED ORAL at 18:11

## 2023-01-11 RX ADMIN — CARBIDOPA AND LEVODOPA 1 TABLET: 25; 100 TABLET ORAL at 20:40

## 2023-01-11 RX ADMIN — ISOSORBIDE MONONITRATE 30 MG: 30 TABLET ORAL at 09:17

## 2023-01-11 RX ADMIN — SUCRALFATE 1 G: 1 TABLET ORAL at 06:19

## 2023-01-11 RX ADMIN — SODIUM CHLORIDE, PRESERVATIVE FREE 10 ML: 5 INJECTION INTRAVENOUS at 09:19

## 2023-01-11 RX ADMIN — SUCRALFATE 1 G: 1 TABLET ORAL at 12:00

## 2023-01-11 RX ADMIN — ENTACAPONE 200 MG: 200 TABLET, FILM COATED ORAL at 13:49

## 2023-01-11 RX ADMIN — DESMOPRESSIN ACETATE 40 MG: 0.2 TABLET ORAL at 20:40

## 2023-01-11 RX ADMIN — ACETAMINOPHEN 1000 MG: 500 TABLET ORAL at 00:24

## 2023-01-11 RX ADMIN — ROPINIROLE HYDROCHLORIDE 0.25 MG: 0.25 TABLET, FILM COATED ORAL at 13:49

## 2023-01-11 RX ADMIN — ACETAMINOPHEN 1000 MG: 500 TABLET ORAL at 09:18

## 2023-01-11 RX ADMIN — ALLOPURINOL 100 MG: 100 TABLET ORAL at 09:17

## 2023-01-11 RX ADMIN — CARBIDOPA AND LEVODOPA 1 TABLET: 25; 100 TABLET ORAL at 18:10

## 2023-01-11 RX ADMIN — ONDANSETRON 4 MG: 4 TABLET, ORALLY DISINTEGRATING ORAL at 06:20

## 2023-01-11 RX ADMIN — CARBIDOPA AND LEVODOPA 1 TABLET: 25; 100 TABLET ORAL at 09:17

## 2023-01-11 RX ADMIN — MAGNESIUM GLUCONATE 500 MG ORAL TABLET 400 MG: 500 TABLET ORAL at 09:17

## 2023-01-11 RX ADMIN — SODIUM CHLORIDE, PRESERVATIVE FREE 10 ML: 5 INJECTION INTRAVENOUS at 20:42

## 2023-01-11 RX ADMIN — CARBIDOPA AND LEVODOPA 1 TABLET: 25; 100 TABLET ORAL at 13:49

## 2023-01-11 RX ADMIN — ROPINIROLE HYDROCHLORIDE 0.25 MG: 0.25 TABLET, FILM COATED ORAL at 09:17

## 2023-01-11 RX ADMIN — PANTOPRAZOLE SODIUM 40 MG: 40 TABLET, DELAYED RELEASE ORAL at 17:14

## 2023-01-11 RX ADMIN — ENTACAPONE 200 MG: 200 TABLET, FILM COATED ORAL at 20:40

## 2023-01-11 RX ADMIN — SUCRALFATE 1 G: 1 TABLET ORAL at 18:10

## 2023-01-11 RX ADMIN — ENTACAPONE 200 MG: 200 TABLET, FILM COATED ORAL at 09:17

## 2023-01-11 ASSESSMENT — PAIN SCALES - GENERAL: PAINLEVEL_OUTOF10: 5

## 2023-01-11 ASSESSMENT — PAIN DESCRIPTION - LOCATION: LOCATION: HEAD

## 2023-01-11 NOTE — CARE COORDINATION
Call from [address] [place] [address] who relates pt will be discharged tonight, will need to call [facility] in the am to see they have obtained precert, they are currently closed.

## 2023-01-11 NOTE — PROGRESS NOTES
INTERVAL NOTE:    Patient assessed this afternoon. Patient resting comfortably in bed with eaten food tray in front of him. Patient reports no emesis today but did acknowledge a small amount of nausea. He states that the anti-emetic medication has helped with his symptoms. Otherwise he is doing well. Plan is to reassess patient later in the afternoon for dispo planning to potentially go home. Patient has returned to the hospital several times for a similar complaint; as such there is a low threshold to keep patient in the hospital for an additional day until symptoms are completely resolved.

## 2023-01-11 NOTE — PROGRESS NOTES
OBS/CDU   Attending NOTE      Patients PCP is:  JW Berg - FELIPE        SUBJECTIVE      Patient with ongoing nausea but improving. Patient has been kept for reevaluation and oral challenge. Patient has had multiple bounce backs and therefore will only be discharged once he is clearly handling orals. Reevaluated by resident this afternoon and doing better. No reported vomiting overnight. PHYSICAL EXAM      General: NAD, AO X 3  Heent: EMOI, PERRL  Neck: SUPPLE, NO JVD  Cardiovascular: RRR, S1S2  Pulmonary: CTAB, NO SOB  Abdomen: SOFT, NTTP, ND, +BS  Extremities: +2/4 PULSES DISTAL, NO SWELLING  Neuro / Psych: NO NUMBNESS OR TINGLING, MENTATION AT BASELINE    PERTINENT TEST /EXAMS      I have reviewed all available laboratory results.     MEDICATIONS CURRENT   pantoprazole (PROTONIX) tablet 40 mg, BID AC  acetaminophen (TYLENOL) tablet 1,000 mg, Q8H PRN  sucralfate (CARAFATE) tablet 1 g, 4 times per day  methyl salicylate-menthol (JOSEPH BRAXTON GREASELESS) 10-15 % cream CREA, TID PRN  sodium chloride flush 0.9 % injection 5-40 mL, 2 times per day  sodium chloride flush 0.9 % injection 5-40 mL, PRN  enoxaparin (LOVENOX) injection 40 mg, Daily  ondansetron (ZOFRAN-ODT) disintegrating tablet 4 mg, Q8H PRN   Or  ondansetron (ZOFRAN) injection 4 mg, Q6H PRN  allopurinol (ZYLOPRIM) tablet 100 mg, Daily  amitriptyline (ELAVIL) tablet 10 mg, Nightly  atorvastatin (LIPITOR) tablet 40 mg, Nightly  carbidopa-levodopa (SINEMET)  MG per tablet 1 tablet, 4x Daily  clopidogrel (PLAVIX) tablet 75 mg, Daily  entacapone (COMTAN) tablet 200 mg, 4x Daily  isosorbide mononitrate (IMDUR) extended release tablet 30 mg, Daily  magnesium oxide (MAG-OX) tablet 400 mg, Daily  rOPINIRole (REQUIP) tablet 0.25 mg, TID  tamsulosin (FLOMAX) capsule 0.4 mg, Daily  metoprolol tartrate (LOPRESSOR) tablet 12.5 mg, BID  prochlorperazine (COMPAZINE) injection 10 mg, Once  diphenhydrAMINE (BENADRYL) injection 25 mg, Once        All medication charted and reviewed. CONSULTS      IP CONSULT TO CASE MANAGEMENT  IP CONSULT TO GI    ASSESSMENT/PLAN       Khang Don is a 79 y.o. male who presents with       Status post cholecystectomy  Patient with significant gastritis. Twice daily PPI  Carafate  Soft diet advance as tolerated  Aggressive analgesia  Reevaluation  Continue home medications and pain control  Monitor vitals, labs, and imaging  DISPO: pending consults and clinical improvement    --  Ruddy López MD  Emergency Medicine Attending Physician     This dictation was generated by voice recognition computer software. Although all attempts are made to edit the dictation for accuracy, there may be errors in the transcription that are not intended.

## 2023-01-12 PROCEDURE — 97116 GAIT TRAINING THERAPY: CPT

## 2023-01-12 PROCEDURE — 97535 SELF CARE MNGMENT TRAINING: CPT

## 2023-01-12 PROCEDURE — 2580000003 HC RX 258: Performed by: INTERNAL MEDICINE

## 2023-01-12 PROCEDURE — 1200000000 HC SEMI PRIVATE

## 2023-01-12 PROCEDURE — 6360000002 HC RX W HCPCS: Performed by: INTERNAL MEDICINE

## 2023-01-12 PROCEDURE — 6370000000 HC RX 637 (ALT 250 FOR IP): Performed by: INTERNAL MEDICINE

## 2023-01-12 PROCEDURE — 97530 THERAPEUTIC ACTIVITIES: CPT

## 2023-01-12 PROCEDURE — 97110 THERAPEUTIC EXERCISES: CPT

## 2023-01-12 PROCEDURE — 6370000000 HC RX 637 (ALT 250 FOR IP): Performed by: EMERGENCY MEDICINE

## 2023-01-12 RX ORDER — SUCRALFATE 1 G/1
1 TABLET ORAL 4 TIMES DAILY
Qty: 120 TABLET | Refills: 3 | Status: SHIPPED | OUTPATIENT
Start: 2023-01-12

## 2023-01-12 RX ORDER — PANTOPRAZOLE SODIUM 40 MG/1
40 TABLET, DELAYED RELEASE ORAL
Qty: 60 TABLET | Refills: 0 | Status: SHIPPED | OUTPATIENT
Start: 2023-01-12 | End: 2023-02-11

## 2023-01-12 RX ADMIN — SUCRALFATE 1 G: 1 TABLET ORAL at 00:12

## 2023-01-12 RX ADMIN — MAGNESIUM GLUCONATE 500 MG ORAL TABLET 400 MG: 500 TABLET ORAL at 08:11

## 2023-01-12 RX ADMIN — ONDANSETRON 4 MG: 4 TABLET, ORALLY DISINTEGRATING ORAL at 08:50

## 2023-01-12 RX ADMIN — CARBIDOPA AND LEVODOPA 1 TABLET: 25; 100 TABLET ORAL at 16:57

## 2023-01-12 RX ADMIN — CARBIDOPA AND LEVODOPA 1 TABLET: 25; 100 TABLET ORAL at 08:11

## 2023-01-12 RX ADMIN — SUCRALFATE 1 G: 1 TABLET ORAL at 17:45

## 2023-01-12 RX ADMIN — CARBIDOPA AND LEVODOPA 1 TABLET: 25; 100 TABLET ORAL at 20:17

## 2023-01-12 RX ADMIN — CLOPIDOGREL 75 MG: 75 TABLET, FILM COATED ORAL at 08:11

## 2023-01-12 RX ADMIN — CARBIDOPA AND LEVODOPA 1 TABLET: 25; 100 TABLET ORAL at 14:10

## 2023-01-12 RX ADMIN — ALLOPURINOL 100 MG: 100 TABLET ORAL at 08:11

## 2023-01-12 RX ADMIN — ENTACAPONE 200 MG: 200 TABLET, FILM COATED ORAL at 20:17

## 2023-01-12 RX ADMIN — ISOSORBIDE MONONITRATE 30 MG: 30 TABLET ORAL at 08:11

## 2023-01-12 RX ADMIN — SUCRALFATE 1 G: 1 TABLET ORAL at 23:46

## 2023-01-12 RX ADMIN — SODIUM CHLORIDE, PRESERVATIVE FREE 10 ML: 5 INJECTION INTRAVENOUS at 08:12

## 2023-01-12 RX ADMIN — ENOXAPARIN SODIUM 40 MG: 100 INJECTION SUBCUTANEOUS at 08:10

## 2023-01-12 RX ADMIN — TAMSULOSIN HYDROCHLORIDE 0.4 MG: 0.4 CAPSULE ORAL at 08:11

## 2023-01-12 RX ADMIN — PANTOPRAZOLE SODIUM 40 MG: 40 TABLET, DELAYED RELEASE ORAL at 08:11

## 2023-01-12 RX ADMIN — ROPINIROLE HYDROCHLORIDE 0.25 MG: 0.25 TABLET, FILM COATED ORAL at 08:11

## 2023-01-12 RX ADMIN — PANTOPRAZOLE SODIUM 40 MG: 40 TABLET, DELAYED RELEASE ORAL at 16:57

## 2023-01-12 RX ADMIN — SUCRALFATE 1 G: 1 TABLET ORAL at 14:09

## 2023-01-12 RX ADMIN — ROPINIROLE HYDROCHLORIDE 0.25 MG: 0.25 TABLET, FILM COATED ORAL at 14:10

## 2023-01-12 RX ADMIN — ENTACAPONE 200 MG: 200 TABLET, FILM COATED ORAL at 08:11

## 2023-01-12 RX ADMIN — ENTACAPONE 200 MG: 200 TABLET, FILM COATED ORAL at 14:10

## 2023-01-12 RX ADMIN — SUCRALFATE 1 G: 1 TABLET ORAL at 05:53

## 2023-01-12 RX ADMIN — DESMOPRESSIN ACETATE 40 MG: 0.2 TABLET ORAL at 20:17

## 2023-01-12 RX ADMIN — ROPINIROLE HYDROCHLORIDE 0.25 MG: 0.25 TABLET, FILM COATED ORAL at 20:17

## 2023-01-12 RX ADMIN — SODIUM CHLORIDE, PRESERVATIVE FREE 10 ML: 5 INJECTION INTRAVENOUS at 20:17

## 2023-01-12 RX ADMIN — ENTACAPONE 200 MG: 200 TABLET, FILM COATED ORAL at 16:57

## 2023-01-12 RX ADMIN — AMITRIPTYLINE HYDROCHLORIDE 10 MG: 10 TABLET, FILM COATED ORAL at 20:17

## 2023-01-12 NOTE — PROGRESS NOTES
Physical Therapy  Facility/Department: [address] OBSERVATION  Physical Therapy Daily Treatment Note    Name: Khang Don  : 1940  MRN: [id number]  Date of Service: 2023    Discharge Recommendations:  Patient would benefit from continued therapy after discharge   PT Equipment Recommendations  Equipment Needed: No (Pt reports owning a RW, writer recommends  use of RW)      Patient Diagnosis(es): The encounter diagnosis was Nausea and vomiting, unspecified vomiting type. 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 Past Surgical History:  has a past surgical history that includes Colonoscopy (2012); shoulder surgery (Right); pr colsc flx w/rmvl of tumor polyp lesion snare tq (N/A, 2017); Colonoscopy (2017); Cholecystectomy, laparoscopic (N/A, 10/29/2022); Esophagogastroduodenoscopy (2023); and Upper gastrointestinal endoscopy (N/A, 2023). Assessment   Body Structures, Functions, Activity Limitations Requiring Skilled Therapeutic Intervention: Decreased functional mobility ; Decreased strength;Decreased endurance;Decreased balance; Increased pain;Decreased coordination  Assessment: Pt continues to have mobility deficits requiring CGA to ambulate 25 feet with a RW. Pt with significant nausea, and L knee pain with mobility this date in addition to impaired standing balance and endurance.   Pt would benefit from additional PT upon discharge to assist in return to independent PLOF. Pt will require 24 hour assistance with mobility upon discharge. Therapy Prognosis: Good  Decision Making: Medium Complexity  Requires PT Follow-Up: Yes  Activity Tolerance  Activity Tolerance: Patient tolerated treatment well;Patient limited by pain     Plan   Physcial Therapy Plan  General Plan:  (5-6x/week)  Current Treatment Recommendations: Strengthening, Balance training, Functional mobility training, Transfer training, Endurance training, Gait training, Stair training, Home exercise program, Safety education & training, Patient/Caregiver education & training, Therapeutic activities  Safety Devices  Type of Devices: Gait belt, Nurse notified, Call light within reach, Left in chair  Restraints  Restraints Initially in Place: No     Restrictions  Restrictions/Precautions  Restrictions/Precautions: Fall Risk  Required Braces or Orthoses?: No  Required Braces or Orthoses  Left Lower Extremity Brace: Knee Brace (personal knee brace)  Position Activity Restriction  Other position/activity restrictions: up with assist, hx of Parkinson's     Subjective   General  Patient assessed for rehabilitation services?: Yes  Response To Previous Treatment: Patient with no complaints from previous session. Family / Caregiver Present: No  Follows Commands: Within Functional Limits  Subjective  Subjective: Pt up in a chair and agreeable to therapy, RN agreeable to therapy. Pt pleasant and cooperative throughout. Pt reports 7/10 L knee pain with mobility. Cognition   Cognition  Overall Cognitive Status: WFL     Objective                             Bed mobility  Bed Mobility Comments: Pt began today's session up in a chair, retired up to a chair at the conclusion of today's session. Transfers  Sit to Stand: Stand by assistance  Stand to Sit: Stand by assistance  Comment: Increased effort to perform.   Ambulation  Surface: Level tile  Device: Rolling Walker  Assistance: Contact guard assistance  Quality of Gait: fair stability, decreased stride length, forward flexed trunk, no LOB. Gait Deviations: Decreased step length;Decreased step height  Distance: 25 feet  More Ambulation?: No  Stairs/Curb  Stairs?: No     Balance  Posture: Fair  Sitting - Static: Good  Sitting - Dynamic: Good;-  Standing - Static: Fair;+  Standing - Dynamic: Fair  Comments: standing balance assessed while using a RW  Exercise Treatment: 2x10 BLE in sitting: hip flexion, LAQ, hip abduction, heel raises. AM-PAC Score  AM-PAC Inpatient Mobility Raw Score : 18 (01/12/23 1132)  AM-PAC Inpatient T-Scale Score : 43.63 (01/12/23 1132)  Mobility Inpatient CMS 0-100% Score: 46.58 (01/12/23 1132)  Mobility Inpatient CMS G-Code Modifier : CK (01/12/23 1132)          Goals  Short Term Goals  Time Frame for Short Term Goals: 10 visits  Short Term Goal 1: Pt to demonstrate safe and independent bed mobility  Short Term Goal 2: Transfers at supervision level. Short Term Goal 3: Ambulate with least restricitive device distance of 80 ft x 2, CGA  Short Term Goal 4: Pt able to perfrom 6\" step upsx 5 to 8 reps, to imporve balance and strength. Short Term Goal 5: Tolerate activity for 30 minutes to improve strength and mobility  Additional Goals?: No  Patient Goals   Patient Goals : Get stronger       Education  Patient Education  Education Given To: Patient  Education Provided: Transfer Training;Plan of Care; Fall Prevention Strategies  Education Method: Verbal  Barriers to Learning: None  Education Outcome: Verbalized understanding;Demonstrated understanding      Therapy Time   Individual Concurrent Group Co-treatment   Time In 1014         Time Out 1040         Minutes 26         Timed Code Treatment Minutes: [address] Reeder, PT

## 2023-01-12 NOTE — DISCHARGE SUMMARY
CDU Discharge Summary        Patient:  Khang Don  YOB: 1940    MRN: [id number]   Acct: [de-identified]    Primary Care Physician: JW Edge CNP    Admit date:  1/6/2023  6:42 PM  Discharge date: 1/13/2023 12:35 PM     Discharge Diagnoses:     Acute nausea and vomiting due to unknown etiology  Improved with medications, rest, GI rest    Follow-up:  Call today/tomorrow for a follow up appointment with JW Edge CNP , or return to the Emergency Room with worsening symptoms    Stressed to patient the importance of following up with primary care doctor for further workup/management of symptoms. Pt verbalizes understanding and agrees with plan. Discharge Medications:  Changes to medications            Medication List        START taking these medications      sucralfate 1 GM tablet  Commonly known as: CARAFATE  Take 1 tablet by mouth 4 times daily            CHANGE how you take these medications      * pantoprazole 40 MG tablet  Commonly known as: PROTONIX  TAKE 1 TABLET BY MOUTH ONCE DAILY FOR GERD  What changed: Another medication with the same name was added. Make sure you understand how and when to take each. * pantoprazole 40 MG tablet  Commonly known as: PROTONIX  Take 1 tablet by mouth 2 times daily (before meals)  What changed: You were already taking a medication with the same name, and this prescription was added. Make sure you understand how and when to take each. * This list has 2 medication(s) that are the same as other medications prescribed for you. Read the directions carefully, and ask your doctor or other care provider to review them with you.                 CONTINUE taking these medications      acetaminophen 650 MG extended release tablet  Commonly known as: Tylenol 8 Hour Arthritis Pain  Take 1 tablet by mouth every 8 hours as needed for Pain     allopurinol 100 MG tablet  Commonly known as: ZYLOPRIM  TAKE 1 TABLET BY MOUTH ONCE DAILY amitriptyline 10 MG tablet  Commonly known as: ELAVIL  Take 1 tablet by mouth nightly     atorvastatin 40 MG tablet  Commonly known as: LIPITOR  take 1 tablet by mouth once daily     butalbital-acetaminophen-caffeine -40 MG per tablet  Commonly known as: FIORICET, ESGIC  Take 1 tab prn only for severe headache. Can repeat after 4 hrs if needed. Max 2 tabs/24 hrs. Max 4 tabs/week     carbidopa-levodopa  MG per tablet  Commonly known as: SINEMET  TAKE ONE (1) TABLET BY MOUTH FOUR (4) TIMES DAILY     clopidogrel 75 MG tablet  Commonly known as: PLAVIX  TAKE 1 TABLET BY MOUTH ONCE DAILY     diclofenac sodium 1 % Gel  Commonly known as: VOLTAREN  Apply 2 g topically 2 times daily as needed for Pain (joint pain)     entacapone 200 MG tablet  Commonly known as: COMTAN  TAKE ONE (1) TABLET BY MOUTH FOUR (4) TIMES DAILY WITH SINEMET     gabapentin 100 MG capsule  Commonly known as: NEURONTIN  TAKE 1 CAPSULE BY MOUTH 3 TIMES DAILY FOR NERVES     ibuprofen 800 MG tablet  Commonly known as: ADVIL;MOTRIN  Take 1 tablet by mouth 2 times daily as needed for Pain     isosorbide mononitrate 30 MG extended release tablet  Commonly known as: IMDUR  TAKE 1 TABLET BY MOUTH ONCE DAILY FOR HYPERTENSION     magnesium oxide 400 (240 Mg) MG tablet  Commonly known as: MAG-OX  TAKE 1 TABLET BY MOUTH ONCE DAILY     metoprolol tartrate 25 MG tablet  Commonly known as: LOPRESSOR  TAKE ONE HALF (1/2) ATBLETS = 12.5MG BY MOUTH TWICE A DAY FOR HYPERTENSION     nitroGLYCERIN 0.4 MG SL tablet  Commonly known as: NITROSTAT  up to max of 3 total doses. If no relief after 1 dose, call 911.      rOPINIRole 0.25 MG tablet  Commonly known as: REQUIP  TAKE ONE (1) TABLET BY MOUTH THREE (3) TIMES DAILY     tamsulosin 0.4 MG capsule  Commonly known as: FLOMAX  Take 1 capsule by mouth daily            ASK your doctor about these medications      ondansetron 4 MG tablet  Commonly known as: Zofran  Take 1 tablet by mouth every 8 hours as needed for Nausea  Ask about: Should I take this medication? Where to Get Your Medications        You can get these medications from any pharmacy    Bring a paper prescription for each of these medications  ondansetron 4 MG tablet  pantoprazole 40 MG tablet  sucralfate 1 GM tablet         Diet:  No diet orders on file , Advance as tolerated     Activity:  As tolerated    Consultants: IP CONSULT TO CASE MANAGEMENT  IP CONSULT TO GI    Procedures:  Not indicated     Diagnostic Test:   Results for orders placed or performed during the hospital encounter of 01/06/23   COVID-19, Rapid    Specimen: Nasopharyngeal Swab   Result Value Ref Range    Specimen Description . NASOPHARYNGEAL SWAB     SARS-CoV-2, Rapid Not Detected Not Detected   COVID-19, Rapid    Specimen: Nasopharyngeal Swab   Result Value Ref Range    Specimen Description . NASOPHARYNGEAL SWAB     SARS-CoV-2, Rapid Not Detected Not Detected   CBC with Auto Differential   Result Value Ref Range    WBC 7.1 3.5 - 11.3 k/uL    RBC 4.51 4.21 - 5.77 m/uL    Hemoglobin 14.4 13.0 - 17.0 g/dL    Hematocrit 42.9 40.7 - 50.3 %    MCV 95.1 82.6 - 102.9 fL    MCH 31.9 25.2 - 33.5 pg    MCHC 33.6 28.4 - 34.8 g/dL    RDW 14.1 11.8 - 14.4 %    Platelets 346 211 - 955 k/uL    MPV 9.0 8.1 - 13.5 fL    NRBC Automated 0.0 0.0 per 100 WBC    Seg Neutrophils 62 36 - 65 %    Lymphocytes 24 24 - 43 %    Monocytes 12 3 - 12 %    Eosinophils % 2 1 - 4 %    Basophils 0 0 - 2 %    Immature Granulocytes 0 0 %    Segs Absolute 4.42 1.50 - 8.10 k/uL    Absolute Lymph # 1.68 1.10 - 3.70 k/uL    Absolute Mono # 0.84 0.10 - 1.20 k/uL    Absolute Eos # 0.12 0.00 - 0.44 k/uL    Basophils Absolute <0.03 0.00 - 0.20 k/uL    Absolute Immature Granulocyte <0.03 0.00 - 0.30 k/uL   Comprehensive Metabolic Panel w/ Reflex to MG   Result Value Ref Range    Glucose 129 (H) 70 - 99 mg/dL    BUN 15 8 - 23 mg/dL    Creatinine 1.47 (H) 0.70 - 1.20 mg/dL    Est, Glom Filt Rate 47 (L) >60 mL/min/1.73m2 Calcium 9.3 8.6 - 10.4 mg/dL    Sodium 137 135 - 144 mmol/L    Potassium 4.9 3.7 - 5.3 mmol/L    Chloride 102 98 - 107 mmol/L    CO2 27 20 - 31 mmol/L    Anion Gap 8 (L) 9 - 17 mmol/L    Alkaline Phosphatase 124 40 - 129 U/L    ALT 28 5 - 41 U/L    AST 23 <40 U/L    Total Bilirubin 0.3 0.3 - 1.2 mg/dL    Total Protein 7.3 6.4 - 8.3 g/dL    Albumin 4.0 3.5 - 5.2 g/dL    Albumin/Globulin Ratio 1.2 1.0 - 2.5   Lipase   Result Value Ref Range    Lipase 29 13 - 60 U/L   Protime-INR   Result Value Ref Range    Protime 10.7 9.1 - 12.3 sec    INR 1.0    APTT   Result Value Ref Range    PTT 24.4 20.5 - 30.5 sec   Troponin   Result Value Ref Range    Troponin, High Sensitivity 24 (H) 0 - 22 ng/L   Troponin   Result Value Ref Range    Troponin, High Sensitivity 25 (H) 0 - 22 ng/L   Lactic Acid   Result Value Ref Range    Lactic Acid, Whole Blood 1.8 0.7 - 2.1 mmol/L   Basic Metabolic Panel   Result Value Ref Range    Glucose 94 70 - 99 mg/dL    BUN 10 8 - 23 mg/dL    Creatinine 1.16 0.70 - 1.20 mg/dL    Est, Glom Filt Rate >60 >60 mL/min/1.73m2    Calcium 8.3 (L) 8.6 - 10.4 mg/dL    Sodium 139 135 - 144 mmol/L    Potassium 4.2 3.7 - 5.3 mmol/L    Chloride 107 98 - 107 mmol/L    CO2 26 20 - 31 mmol/L    Anion Gap 6 (L) 9 - 17 mmol/L   Magnesium   Result Value Ref Range    Magnesium 2.0 1.6 - 2.6 mg/dL   CBC with Auto Differential   Result Value Ref Range    WBC 5.0 3.5 - 11.3 k/uL    RBC 4.07 (L) 4.21 - 5.77 m/uL    Hemoglobin 13.0 13.0 - 17.0 g/dL    Hematocrit 39.4 (L) 40.7 - 50.3 %    MCV 96.8 82.6 - 102.9 fL    MCH 31.9 25.2 - 33.5 pg    MCHC 33.0 28.4 - 34.8 g/dL    RDW 14.1 11.8 - 14.4 %    Platelets 879 281 - 183 k/uL    MPV 9.1 8.1 - 13.5 fL    NRBC Automated 0.0 0.0 per 100 WBC    Seg Neutrophils 55 36 - 65 %    Lymphocytes 27 24 - 43 %    Monocytes 12 3 - 12 %    Eosinophils % 5 (H) 1 - 4 %    Basophils 1 0 - 2 %    Immature Granulocytes 0 0 %    Segs Absolute 2.81 1.50 - 8.10 k/uL    Absolute Lymph # 1.36 1.10 - 3.70 k/uL    Absolute Mono # 0.60 0.10 - 1.20 k/uL    Absolute Eos # 0.23 0.00 - 0.44 k/uL    Basophils Absolute 0.03 0.00 - 0.20 k/uL    Absolute Immature Granulocyte <0.03 0.00 - 0.30 k/uL   Basic Metabolic Panel   Result Value Ref Range    Glucose 108 (H) 70 - 99 mg/dL    BUN 7 (L) 8 - 23 mg/dL    Creatinine 1.15 0.70 - 1.20 mg/dL    Est, Glom Filt Rate >60 >60 mL/min/1.73m2    Calcium 8.8 8.6 - 10.4 mg/dL    Sodium 137 135 - 144 mmol/L    Potassium 4.4 3.7 - 5.3 mmol/L    Chloride 108 (H) 98 - 107 mmol/L    CO2 21 20 - 31 mmol/L    Anion Gap 8 (L) 9 - 17 mmol/L   Magnesium   Result Value Ref Range    Magnesium 2.2 1.6 - 2.6 mg/dL   CBC with Auto Differential   Result Value Ref Range    WBC 4.3 3.5 - 11.3 k/uL    RBC 4.08 (L) 4.21 - 5.77 m/uL    Hemoglobin 13.2 13.0 - 17.0 g/dL    Hematocrit 40.0 (L) 40.7 - 50.3 %    MCV 98.0 82.6 - 102.9 fL    MCH 32.4 25.2 - 33.5 pg    MCHC 33.0 28.4 - 34.8 g/dL    RDW 14.2 11.8 - 14.4 %    Platelets 493 556 - 874 k/uL    MPV 9.3 8.1 - 13.5 fL    NRBC Automated 0.0 0.0 per 100 WBC    Seg Neutrophils 57 36 - 65 %    Lymphocytes 27 24 - 43 %    Monocytes 11 3 - 12 %    Eosinophils % 4 1 - 4 %    Basophils 1 0 - 2 %    Immature Granulocytes 0 0 %    Segs Absolute 2.41 1.50 - 8.10 k/uL    Absolute Lymph # 1.16 1.10 - 3.70 k/uL    Absolute Mono # 0.48 0.10 - 1.20 k/uL    Absolute Eos # 0.18 0.00 - 0.44 k/uL    Basophils Absolute <0.03 0.00 - 0.20 k/uL    Absolute Immature Granulocyte <0.03 0.00 - 0.30 k/uL   EKG 12 Lead   Result Value Ref Range    Ventricular Rate 62 BPM    Atrial Rate 220 BPM    QRS Duration 106 ms    Q-T Interval 432 ms    QTc Calculation (Bazett) 438 ms    P Axis 26 degrees    R Axis -38 degrees    T Axis 49 degrees   TYPE AND SCREEN   Result Value Ref Range    Expiration Date 01/09/2023,2359     Arm Band Number [id number]     ABO/Rh O POSITIVE     Antibody Screen NEGATIVE      CT HEAD WO CONTRAST    Result Date: 1/7/2023  EXAMINATION: CT OF THE HEAD WITHOUT CONTRAST 1/7/2023 1:46 pm TECHNIQUE: CT of the head was performed without the administration of intravenous contrast. Automated exposure control, iterative reconstruction, and/or weight based adjustment of the mA/kV was utilized to reduce the radiation dose to as low as reasonably achievable. COMPARISON: CT brain 08/13/2021 HISTORY: ORDERING SYSTEM PROVIDED HISTORY: Recurrent vomiting. R/o mass / increased ICP TECHNOLOGIST PROVIDED HISTORY: Recurrent vomiting. R/o mass / increased ICP Reason for Exam: Recurrent vomiting. R/o mass / increased ICP FINDINGS: BRAIN/VENTRICLES: There is no acute infarct or acute intracranial hemorrhage present. There is no mass effect or midline shift present. There is mild diffuse cerebral volume loss. There is mild periventricular hypoattenuation. There is no ventriculomegaly or abnormal extra-axial fluid collection present. ORBITS: Limited evaluation of the orbits is unremarkable. SINUSES: The paranasal sinuses and mastoid air cells are clear. SOFT TISSUES/SKULL:  No lytic or blastic osseous lesions are identified. 1. No acute intracranial process identified. 2. Mild diffuse cerebral volume loss and mild chronic small vessel ischemic changes. CT ABDOMEN PELVIS W IV CONTRAST Additional Contrast? None    Result Date: 1/6/2023  EXAMINATION: CT OF THE ABDOMEN AND PELVIS WITH CONTRAST 1/6/2023 9:58 pm TECHNIQUE: CT of the abdomen and pelvis was performed with the administration of intravenous contrast. Multiplanar reformatted images are provided for review. Automated exposure control, iterative reconstruction, and/or weight based adjustment of the mA/kV was utilized to reduce the radiation dose to as low as reasonably achievable.  COMPARISON: CT of 11/29/2022 HISTORY: ORDERING SYSTEM PROVIDED HISTORY: ill defined fluid collection post op from cholecystectomy october 2022, now with epigastric pain and vomiting, blood tinged TECHNOLOGIST PROVIDED HISTORY: ill defined fluid collection post op from cholecystectomy october 2022, now with epigastric pain and vomiting, blood tinged Decision Support Exception - unselect if not a suspected or confirmed emergency medical condition->Emergency Medical Condition (MA) Reason for Exam: ill defined fluid collection post op from cholecystectomy october 2022, now with epigastric pain and vomiting, blood tinged FINDINGS: LOWER CHEST: 1 cm tubular structure within the right lower lobe which appears to be an impacted bronchus, similar to prior CT examination of 06/27/2022. Visualized portion of the lower chest demonstrates no acute abnormality. Small axial hiatal hernia containing part of the stomach. KIDNEYS AND URINARY TRACT: No renal calculi are identified. There is no evidence for hydronephrosis. The ureters are of normal course and caliber. ORGANS: Status post cholecystectomy with interval decrease in fluid density within the gallbladder fossa now measuring 1.7 x 2.6 cm in comparison with prior examination when it measured 3.1 x 3 cm. Findings are likely suggestive of postsurgical seroma/hematoma which is gradually resolving. Visualized portions of the liver, spleen, pancreas,  and adrenal glands demonstrate no acute abnormality. GI/BOWEL: No bowel obstruction. No evidence of acute appendicitis. Diverticulosis with no evidence of diverticulitis. PELVIS: The bladder and pelvic organs are unremarkable. PERITONEUM/RETROPERITONEUM: No free air or free fluid is noted. No pathologically enlarged lymphadenopathy. The vasculature do not demonstrate acute abnormality. BONES/SOFT TISSUES: The osseous structures demonstrate no acute abnormality. Multilevel disc and facet degenerative disease. At L4-L5, grade 1 anterolisthesis. Appendix is normal.  No obstructive uropathy. Status post cholecystectomy with interval decrease in fluid density within the gallbladder fossa now measuring 1.7 x 2.6 cm in comparison with prior examination when it measured 3.1 x 3 cm.   Findings are likely suggestive of postsurgical seroma/hematoma which is gradually resolving. Diverticulosis with no evidence of diverticulitis. Small axial hiatal hernia containing part of the stomach. 1 cm tubular structure within the right lower lobe which appears to be an impacted bronchus, similar to prior CT examination of 06/27/2022. XR CHEST PORTABLE    Result Date: 1/6/2023  EXAMINATION: ONE XRAY VIEW OF THE CHEST 1/6/2023 7:15 pm COMPARISON: 11/28/2022. HISTORY: ORDERING SYSTEM PROVIDED HISTORY: blood tinged emesis TECHNOLOGIST PROVIDED HISTORY: blood tinged emesis FINDINGS: Cardiomediastinal silhouette appears within normal limits. No focal consolidation or overt pulmonary edema. Costophrenic angles are sharp. No evidence of pneumothorax. No acute osseous abnormalities. No radiographic evidence of an acute cardiopulmonary process. Physical Exam:    General appearance - NAD, AOx 3   Lungs -CTAB, no R/R/R  Heart - RRR, no M/R/G  Abdomen - Soft, NT/ND  Neurological:  MAEx4, No focal motor deficit, sensory loss  Extremities - Cap refil <2 sec in all ext., no edema  Skin -warm, dry      Hospital Course:  Clinical course has improved, labs and imaging reviewed. Khang Don originally presented to the hospital on 1/6/2023  6:42 PM. with nausea and vomiting. At that time it was determined that He required further observation and medical management. He was admitted and labs and imaging were followed daily. Imaging results as above. He is medically stable to be discharged. Disposition: Home    Patient stated that they will not drive themselves home from the hospital if they have gotten pain killers/ narcotics earlier that day and that they will arrange for transportation on their own or work with the  for a ride. Patient counseled NOT to drive while under the influence of narcotics/ pain killers. Condition: Good    Patient stable and ready for discharge home.  I have discussed plan of care with patient and they are in understanding. They were instructed to read discharge paperwork. All of their questions and concerns were addressed. Time Spent: 5 day      --  Shyanne Cummings MD  Emergency Medicine Resident Physician    This dictation was generated by voice recognition computer software. Although all attempts are made to edit the dictation for accuracy, there may be errors in the transcription that are not intended.

## 2023-01-12 NOTE — PROGRESS NOTES
Occupational Therapy  Facility/Department: [facility] 3C OBSERVATION  Occupational Therapy Daily Treatment Note    Name: Zaid Lee  : 1940  MRN: [id number]  Date of Service: 2023    Discharge Recommendations:Further therapy recommended at discharge in order to increase pt balance, strength and independence. Assessment   Performance deficits / Impairments: Decreased functional mobility ; Decreased ADL status; Decreased endurance;Decreased high-level IADLs;Decreased strength;Decreased balance;Decreased safe awareness  Prognosis: Good  REQUIRES OT FOLLOW-UP: Yes  Activity Tolerance  Activity Tolerance: Patient limited by fatigue;Patient limited by pain  Activity Tolerance Comments: Throughout session pt required increased time        Plan   Occupational Therapy Plan  Times Per Week: 3-4x/wk     Restrictions  Restrictions/Precautions  Restrictions/Precautions: Fall Risk  Required Braces or Orthoses?: No  Required Braces or Orthoses  Left Lower Extremity Brace: Knee Brace (personal knee brace)  Position Activity Restriction  Other position/activity restrictions: up with assist, hx of Parkinson's    Subjective   General  Chart Reviewed: Yes  Family / Caregiver Present: No  General Comment  Comments: Pt and RN agreeable to therapy this day. Pt supine in bed at start of session requiring min encouragement for activity. At session end pt retired to seated in chair with call light in reach, chair alarm on and BLE elevated. Pt c/o 6/10 pain in LLE stating, \"it feels like a bug is stinking me and crawling in my skin. \"            Objective        Safety Devices  Type of Devices: Call light within reach;Gait belt;Left in chair;Chair alarm in place; Patient at risk for falls;Nurse notified  Restraints  Restraints Initially in Place: No  Balance  Sitting: Without support (Pt tolerated approx 4-5 min static/dynamic sitting unsupported at EOB)  Standing: With support (CGA static standing with cane tolerating approx 1 min)  Gait  Overall Level of Assistance: Minimum assistance (EOB>chair utilizing standard point cane demo forward flexed posture)        ADL  Grooming: Modified independent ;Setup  Grooming Skilled Clinical Factors: face washing facilitated seated in chair  UE Bathing: Minimal assistance;Setup;Verbal cueing  UE Bathing Skilled Clinical Factors: to doff/don gown seated in chair requiring Min A for threading and threading orientation  Additional Comments: Throughout session pt limited per fatigue, c/o naseous and pain.  RN notified of c/o naseous and provided medication     Activity Tolerance  Activity Tolerance: Patient tolerated treatment well;Patient limited by pain  Bed mobility  Supine to Sit: Stand by assistance  Scooting: Stand by assistance  Bed Mobility Comments: HOB elevated, utilizing bed rails  Transfers  Sit to stand: Contact guard assistance  Stand to sit: Contact guard assistance  Transfer Comments: utilizing cane     Cognition  Overall Cognitive Status: WFL  Orientation  Overall Orientation Status: Within Functional Limits                  Education Given To: Patient  Education Provided: Role of Therapy;Transfer Training  Education Provided Comments: Proper hand and foot placement; balance maintaince; proper posture-F carry over  Education Method: Verbal  Education Outcome: Continued education needed                                 AM-PAC Score        AM-[facility] Inpatient Daily Activity Raw Score: 19 (01/12/23 1211)  AM-PAC Inpatient ADL T-Scale Score : 40.22 (01/12/23 1211)  ADL Inpatient CMS 0-100% Score: 42.8 (01/12/23 1211)  ADL Inpatient CMS G-Code Modifier : CK (01/12/23 1211)        Goals  Short Term Goals  Time Frame for Short Term Goals: pt will, by discharge  Short Term Goal 1: complete LB ADLs with min A, setu p and AE, as needed  Short Term Goal 2: complete UB ADLs with supervision  Short Term Goal 3: increase activity tolerance to 15+ minutes in order to participate in daily tasks  Short Term Goal 4: dem SBA during functional transfers/functional mobility with LRD ,as needed  Short Term Goal 5: dem ~6 minutes dynamic standing tolerance with SBA in order to complete functional tasks       Therapy Time   Individual Concurrent Group Co-treatment   Time In 0838         Time Out 0902         Minutes 24         Timed Code Treatment Minutes: Thompson 25, TEMPLE/L

## 2023-01-12 NOTE — PROGRESS NOTES
OBS/CDU   RESIDENT NOTE      Patients PCP is:  JW Beyer CNP        SUBJECTIVE      No acute events overnight. Has been able to tolerate a full diet without nausea or vomiting. The patient is urinating on his own and is passing flatus. Denies fever, chills, nausea, vomiting, chest pain, shortness of breath, abdominal pain, focal weakness, numbness, tingling, urinary/bowel symptoms, vision changes, visual hallucinations, or headache. PHYSICAL EXAM      General: NAD, AO X 3  Heent: EMOI, PERRL  Neck: SUPPLE, NO JVD  Cardiovascular: RRR, S1S2  Pulmonary: CTAB, NO SOB  Abdomen: SOFT, NTTP, ND, +BS  Extremities: +2/4 PULSES DISTAL, NO SWELLING  Neuro / Psych: NO NUMBNESS OR TINGLING, MENTATION AT BASELINE    PERTINENT TEST /EXAMS      I have reviewed all available laboratory results.     MEDICATIONS CURRENT   pantoprazole (PROTONIX) tablet 40 mg, BID AC  acetaminophen (TYLENOL) tablet 1,000 mg, Q8H PRN  sucralfate (CARAFATE) tablet 1 g, 4 times per day  methyl salicylate-menthol (JOSEPH BRAXTON GREASELESS) 10-15 % cream CREA, TID PRN  sodium chloride flush 0.9 % injection 5-40 mL, 2 times per day  sodium chloride flush 0.9 % injection 5-40 mL, PRN  enoxaparin (LOVENOX) injection 40 mg, Daily  ondansetron (ZOFRAN-ODT) disintegrating tablet 4 mg, Q8H PRN   Or  ondansetron (ZOFRAN) injection 4 mg, Q6H PRN  allopurinol (ZYLOPRIM) tablet 100 mg, Daily  amitriptyline (ELAVIL) tablet 10 mg, Nightly  atorvastatin (LIPITOR) tablet 40 mg, Nightly  carbidopa-levodopa (SINEMET)  MG per tablet 1 tablet, 4x Daily  clopidogrel (PLAVIX) tablet 75 mg, Daily  entacapone (COMTAN) tablet 200 mg, 4x Daily  isosorbide mononitrate (IMDUR) extended release tablet 30 mg, Daily  magnesium oxide (MAG-OX) tablet 400 mg, Daily  rOPINIRole (REQUIP) tablet 0.25 mg, TID  tamsulosin (FLOMAX) capsule 0.4 mg, Daily  metoprolol tartrate (LOPRESSOR) tablet 12.5 mg, BID  prochlorperazine (COMPAZINE) injection 10 mg, Once  diphenhydrAMINE (BENADRYL) injection 25 mg, Once        All medication charted and reviewed. CONSULTS      IP CONSULT TO CASE MANAGEMENT  IP CONSULT TO GI    ASSESSMENT/PLAN       Jameel Alexis is a 79 y.o. male who presents with nausea and vomiting status post cholecystectomy     Protonix twice daily for 8 weeks and then once daily indefinitely. Carafate 4 times daily  Antiemetics  EGD performed by GI on 1/9/23 showed esophageal ulcerations and severe grade D esophagitis  Continue home medications and pain control  Monitor vitals, labs, and imaging     DISPO: pending ability to tolerate oral intake. --  Stacie Shelby MD   Emergency Medicine Resident Physician     This dictation was generated by voice recognition computer software. Although all attempts are made to edit the dictation for accuracy, there may be errors in the transcription that are not intended.

## 2023-01-12 NOTE — PROGRESS NOTES
[address] [employer]  CDU / OBSERVATION ENCOUNTER  ATTENDING NOTE       I performed a history and physical examination of the patient and discussed management with the resident or midlevel provider. I reviewed the resident or midlevel provider's note and agree with the documented findings and plan of care. Any areas of disagreement are noted on the chart. I was personally present for the key portions of any procedures. I have documented in the chart those procedures where I was not present during the key portions. I have reviewed the nurses notes. I agree with the chief complaint, past medical history, past surgical history, allergies, medications, social and family history as documented unless otherwise noted below. The Family history, social history, and ROS are effectively unchanged since admission unless noted elsewhere in the chart. Patient for clinical reevaluation. Patient had no vomiting yesterday per nursing reports. Patient reports improvement in nausea and symptoms. Patient had severe gastritis noted and is on PPIs. Patient has had minor symptoms. Patient for reevaluation and anticipation of ECF placement.     Rico Gunter MD  Attending Emergency  Physician

## 2023-01-13 ENCOUNTER — CARE COORDINATION (OUTPATIENT)
Dept: CARE COORDINATION | Age: 83
End: 2023-01-13

## 2023-01-13 VITALS
DIASTOLIC BLOOD PRESSURE: 53 MMHG | HEART RATE: 58 BPM | HEIGHT: 67 IN | TEMPERATURE: 97.2 F | RESPIRATION RATE: 16 BRPM | SYSTOLIC BLOOD PRESSURE: 100 MMHG | WEIGHT: 180 LBS | BODY MASS INDEX: 28.25 KG/M2 | OXYGEN SATURATION: 99 %

## 2023-01-13 LAB
SARS-COV-2, RAPID: NOT DETECTED
SPECIMEN DESCRIPTION: NORMAL

## 2023-01-13 PROCEDURE — 6370000000 HC RX 637 (ALT 250 FOR IP): Performed by: EMERGENCY MEDICINE

## 2023-01-13 PROCEDURE — 6370000000 HC RX 637 (ALT 250 FOR IP): Performed by: INTERNAL MEDICINE

## 2023-01-13 PROCEDURE — 6360000002 HC RX W HCPCS: Performed by: INTERNAL MEDICINE

## 2023-01-13 PROCEDURE — 87635 SARS-COV-2 COVID-19 AMP PRB: CPT

## 2023-01-13 PROCEDURE — 2580000003 HC RX 258: Performed by: INTERNAL MEDICINE

## 2023-01-13 RX ADMIN — MAGNESIUM GLUCONATE 500 MG ORAL TABLET 400 MG: 500 TABLET ORAL at 08:52

## 2023-01-13 RX ADMIN — PANTOPRAZOLE SODIUM 40 MG: 40 TABLET, DELAYED RELEASE ORAL at 08:52

## 2023-01-13 RX ADMIN — ENTACAPONE 200 MG: 200 TABLET, FILM COATED ORAL at 08:52

## 2023-01-13 RX ADMIN — ENOXAPARIN SODIUM 40 MG: 100 INJECTION SUBCUTANEOUS at 08:52

## 2023-01-13 RX ADMIN — ISOSORBIDE MONONITRATE 30 MG: 30 TABLET ORAL at 08:53

## 2023-01-13 RX ADMIN — CARBIDOPA AND LEVODOPA 1 TABLET: 25; 100 TABLET ORAL at 08:53

## 2023-01-13 RX ADMIN — SUCRALFATE 1 G: 1 TABLET ORAL at 05:57

## 2023-01-13 RX ADMIN — SODIUM CHLORIDE, PRESERVATIVE FREE 10 ML: 5 INJECTION INTRAVENOUS at 08:59

## 2023-01-13 RX ADMIN — TAMSULOSIN HYDROCHLORIDE 0.4 MG: 0.4 CAPSULE ORAL at 08:52

## 2023-01-13 RX ADMIN — ROPINIROLE HYDROCHLORIDE 0.25 MG: 0.25 TABLET, FILM COATED ORAL at 08:52

## 2023-01-13 RX ADMIN — CLOPIDOGREL 75 MG: 75 TABLET, FILM COATED ORAL at 08:53

## 2023-01-13 RX ADMIN — ALLOPURINOL 100 MG: 100 TABLET ORAL at 08:52

## 2023-01-13 RX ADMIN — ONDANSETRON 4 MG: 4 TABLET, ORALLY DISINTEGRATING ORAL at 08:56

## 2023-01-13 NOTE — CARE COORDINATION
Spoke with admissions at [facility], she will check on precert call writer back. 10:50 Received call from [facility], pt has precert. Transportation scheduled [facility] for 12:00 noon  Susanna Tovar informed. Oh number given to [phone] Sonali Wilks I RN for report, covid swab in process.  AVS faxed to [phone]

## 2023-01-13 NOTE — PROGRESS NOTES
[address] [employer]  CDU / OBSERVATION ENCOUNTER  ATTENDING NOTE       I performed a history and physical examination of the patient and discussed management with the resident or midlevel provider. I reviewed the resident or midlevel provider's note and agree with the documented findings and plan of care. Any areas of disagreement are noted on the chart. I was personally present for the key portions of any procedures. I have documented in the chart those procedures where I was not present during the key portions. I have reviewed the nurses notes. I agree with the chief complaint, past medical history, past surgical history, allergies, medications, social and family history as documented unless otherwise noted below. The Family history, social history, and ROS are effectively unchanged since admission unless noted elsewhere in the chart. Patient feels well. Patient looking well. Patient has no new complaints. No further vomiting. Patient in good condition for discharge.     Faustino Crowe MD  Attending Emergency  Physician

## 2023-01-13 NOTE — DISCHARGE SUMMARY
CDU Discharge Summary        Patient:  Rosa Mckeon  YOB: 1940    MRN: [id number]   Acct: [de-identified]    Primary Care Physician: JW Samano CNP    Admit date:  1/6/2023  6:42 PM  Discharge date:1/13/2023 12:46PM    Discharge Diagnoses:     Acute abdominal pain and vomitting due to post surgical pain and ulceration in the esophagus  Improved with pain management and antacid therapy    Follow-up:  Call today/tomorrow for a follow up appointment with JW Samano CNP , or return to the Emergency Room with worsening symptoms    Stressed to patient the importance of following up with primary care doctor for further workup/management of symptoms. Pt verbalizes understanding and agrees with plan. Discharge Medications:  Changes to medications            Medication List        START taking these medications      sucralfate 1 GM tablet  Commonly known as: CARAFATE  Take 1 tablet by mouth 4 times daily            CHANGE how you take these medications      * pantoprazole 40 MG tablet  Commonly known as: PROTONIX  TAKE 1 TABLET BY MOUTH ONCE DAILY FOR GERD  What changed: Another medication with the same name was added. Make sure you understand how and when to take each. * pantoprazole 40 MG tablet  Commonly known as: PROTONIX  Take 1 tablet by mouth 2 times daily (before meals)  What changed: You were already taking a medication with the same name, and this prescription was added. Make sure you understand how and when to take each. * This list has 2 medication(s) that are the same as other medications prescribed for you. Read the directions carefully, and ask your doctor or other care provider to review them with you.                 CONTINUE taking these medications      acetaminophen 650 MG extended release tablet  Commonly known as: Tylenol 8 Hour Arthritis Pain  Take 1 tablet by mouth every 8 hours as needed for Pain     allopurinol 100 MG tablet  Commonly known as: ZYLOPRIM  TAKE 1 TABLET BY MOUTH ONCE DAILY     amitriptyline 10 MG tablet  Commonly known as: ELAVIL  Take 1 tablet by mouth nightly     atorvastatin 40 MG tablet  Commonly known as: LIPITOR  take 1 tablet by mouth once daily     butalbital-acetaminophen-caffeine -40 MG per tablet  Commonly known as: FIORICET, ESGIC  Take 1 tab prn only for severe headache. Can repeat after 4 hrs if needed. Max 2 tabs/24 hrs. Max 4 tabs/week     carbidopa-levodopa  MG per tablet  Commonly known as: SINEMET  TAKE ONE (1) TABLET BY MOUTH FOUR (4) TIMES DAILY     clopidogrel 75 MG tablet  Commonly known as: PLAVIX  TAKE 1 TABLET BY MOUTH ONCE DAILY     diclofenac sodium 1 % Gel  Commonly known as: VOLTAREN  Apply 2 g topically 2 times daily as needed for Pain (joint pain)     entacapone 200 MG tablet  Commonly known as: COMTAN  TAKE ONE (1) TABLET BY MOUTH FOUR (4) TIMES DAILY WITH SINEMET     gabapentin 100 MG capsule  Commonly known as: NEURONTIN  TAKE 1 CAPSULE BY MOUTH 3 TIMES DAILY FOR NERVES     ibuprofen 800 MG tablet  Commonly known as: ADVIL;MOTRIN  Take 1 tablet by mouth 2 times daily as needed for Pain     isosorbide mononitrate 30 MG extended release tablet  Commonly known as: IMDUR  TAKE 1 TABLET BY MOUTH ONCE DAILY FOR HYPERTENSION     magnesium oxide 400 (240 Mg) MG tablet  Commonly known as: MAG-OX  TAKE 1 TABLET BY MOUTH ONCE DAILY     metoprolol tartrate 25 MG tablet  Commonly known as: LOPRESSOR  TAKE ONE HALF (1/2) ATBLETS = 12.5MG BY MOUTH TWICE A DAY FOR HYPERTENSION     nitroGLYCERIN 0.4 MG SL tablet  Commonly known as: NITROSTAT  up to max of 3 total doses. If no relief after 1 dose, call 911.      rOPINIRole 0.25 MG tablet  Commonly known as: REQUIP  TAKE ONE (1) TABLET BY MOUTH THREE (3) TIMES DAILY     tamsulosin 0.4 MG capsule  Commonly known as: FLOMAX  Take 1 capsule by mouth daily            ASK your doctor about these medications      ondansetron 4 MG tablet  Commonly known as: Zofran  Take 1 tablet by mouth every 8 hours as needed for Nausea  Ask about: Should I take this medication? Where to Get Your Medications        You can get these medications from any pharmacy    Bring a paper prescription for each of these medications  ondansetron 4 MG tablet  pantoprazole 40 MG tablet  sucralfate 1 GM tablet         Diet:  ADULT DIET; Regular; Low Fat/Low Chol/High Fiber/OMKAR; Low Sodium (2 gm) , Advance as tolerated     Activity:  As tolerated    Consultants: IP CONSULT TO CASE MANAGEMENT  IP CONSULT TO GI    Procedures:  EGD     Diagnostic Test:   Results for orders placed or performed during the hospital encounter of 01/06/23   COVID-19, Rapid    Specimen: Nasopharyngeal Swab   Result Value Ref Range    Specimen Description . NASOPHARYNGEAL SWAB     SARS-CoV-2, Rapid Not Detected Not Detected   CBC with Auto Differential   Result Value Ref Range    WBC 7.1 3.5 - 11.3 k/uL    RBC 4.51 4.21 - 5.77 m/uL    Hemoglobin 14.4 13.0 - 17.0 g/dL    Hematocrit 42.9 40.7 - 50.3 %    MCV 95.1 82.6 - 102.9 fL    MCH 31.9 25.2 - 33.5 pg    MCHC 33.6 28.4 - 34.8 g/dL    RDW 14.1 11.8 - 14.4 %    Platelets 892 772 - 131 k/uL    MPV 9.0 8.1 - 13.5 fL    NRBC Automated 0.0 0.0 per 100 WBC    Seg Neutrophils 62 36 - 65 %    Lymphocytes 24 24 - 43 %    Monocytes 12 3 - 12 %    Eosinophils % 2 1 - 4 %    Basophils 0 0 - 2 %    Immature Granulocytes 0 0 %    Segs Absolute 4.42 1.50 - 8.10 k/uL    Absolute Lymph # 1.68 1.10 - 3.70 k/uL    Absolute Mono # 0.84 0.10 - 1.20 k/uL    Absolute Eos # 0.12 0.00 - 0.44 k/uL    Basophils Absolute <0.03 0.00 - 0.20 k/uL    Absolute Immature Granulocyte <0.03 0.00 - 0.30 k/uL   Comprehensive Metabolic Panel w/ Reflex to MG   Result Value Ref Range    Glucose 129 (H) 70 - 99 mg/dL    BUN 15 8 - 23 mg/dL    Creatinine 1.47 (H) 0.70 - 1.20 mg/dL    Est, Glom Filt Rate 47 (L) >60 mL/min/1.73m2    Calcium 9.3 8.6 - 10.4 mg/dL    Sodium 137 135 - 144 mmol/L    Potassium 4.9 3.7 - 5.3 mmol/L    Chloride 102 98 - 107 mmol/L    CO2 27 20 - 31 mmol/L    Anion Gap 8 (L) 9 - 17 mmol/L    Alkaline Phosphatase 124 40 - 129 U/L    ALT 28 5 - 41 U/L    AST 23 <40 U/L    Total Bilirubin 0.3 0.3 - 1.2 mg/dL    Total Protein 7.3 6.4 - 8.3 g/dL    Albumin 4.0 3.5 - 5.2 g/dL    Albumin/Globulin Ratio 1.2 1.0 - 2.5   Lipase   Result Value Ref Range    Lipase 29 13 - 60 U/L   Protime-INR   Result Value Ref Range    Protime 10.7 9.1 - 12.3 sec    INR 1.0    APTT   Result Value Ref Range    PTT 24.4 20.5 - 30.5 sec   Troponin   Result Value Ref Range    Troponin, High Sensitivity 24 (H) 0 - 22 ng/L   Troponin   Result Value Ref Range    Troponin, High Sensitivity 25 (H) 0 - 22 ng/L   Lactic Acid   Result Value Ref Range    Lactic Acid, Whole Blood 1.8 0.7 - 2.1 mmol/L   Basic Metabolic Panel   Result Value Ref Range    Glucose 94 70 - 99 mg/dL    BUN 10 8 - 23 mg/dL    Creatinine 1.16 0.70 - 1.20 mg/dL    Est, Glom Filt Rate >60 >60 mL/min/1.73m2    Calcium 8.3 (L) 8.6 - 10.4 mg/dL    Sodium 139 135 - 144 mmol/L    Potassium 4.2 3.7 - 5.3 mmol/L    Chloride 107 98 - 107 mmol/L    CO2 26 20 - 31 mmol/L    Anion Gap 6 (L) 9 - 17 mmol/L   Magnesium   Result Value Ref Range    Magnesium 2.0 1.6 - 2.6 mg/dL   CBC with Auto Differential   Result Value Ref Range    WBC 5.0 3.5 - 11.3 k/uL    RBC 4.07 (L) 4.21 - 5.77 m/uL    Hemoglobin 13.0 13.0 - 17.0 g/dL    Hematocrit 39.4 (L) 40.7 - 50.3 %    MCV 96.8 82.6 - 102.9 fL    MCH 31.9 25.2 - 33.5 pg    MCHC 33.0 28.4 - 34.8 g/dL    RDW 14.1 11.8 - 14.4 %    Platelets 590 975 - 019 k/uL    MPV 9.1 8.1 - 13.5 fL    NRBC Automated 0.0 0.0 per 100 WBC    Seg Neutrophils 55 36 - 65 %    Lymphocytes 27 24 - 43 %    Monocytes 12 3 - 12 %    Eosinophils % 5 (H) 1 - 4 %    Basophils 1 0 - 2 %    Immature Granulocytes 0 0 %    Segs Absolute 2.81 1.50 - 8.10 k/uL    Absolute Lymph # 1.36 1.10 - 3.70 k/uL    Absolute Mono # 0.60 0.10 - 1.20 k/uL    Absolute Eos # 0.23 0.00 - 0.44 k/uL Basophils Absolute 0.03 0.00 - 0.20 k/uL    Absolute Immature Granulocyte <0.03 0.00 - 0.30 k/uL   Basic Metabolic Panel   Result Value Ref Range    Glucose 108 (H) 70 - 99 mg/dL    BUN 7 (L) 8 - 23 mg/dL    Creatinine 1.15 0.70 - 1.20 mg/dL    Est, Glom Filt Rate >60 >60 mL/min/1.73m2    Calcium 8.8 8.6 - 10.4 mg/dL    Sodium 137 135 - 144 mmol/L    Potassium 4.4 3.7 - 5.3 mmol/L    Chloride 108 (H) 98 - 107 mmol/L    CO2 21 20 - 31 mmol/L    Anion Gap 8 (L) 9 - 17 mmol/L   Magnesium   Result Value Ref Range    Magnesium 2.2 1.6 - 2.6 mg/dL   CBC with Auto Differential   Result Value Ref Range    WBC 4.3 3.5 - 11.3 k/uL    RBC 4.08 (L) 4.21 - 5.77 m/uL    Hemoglobin 13.2 13.0 - 17.0 g/dL    Hematocrit 40.0 (L) 40.7 - 50.3 %    MCV 98.0 82.6 - 102.9 fL    MCH 32.4 25.2 - 33.5 pg    MCHC 33.0 28.4 - 34.8 g/dL    RDW 14.2 11.8 - 14.4 %    Platelets 377 230 - 602 k/uL    MPV 9.3 8.1 - 13.5 fL    NRBC Automated 0.0 0.0 per 100 WBC    Seg Neutrophils 57 36 - 65 %    Lymphocytes 27 24 - 43 %    Monocytes 11 3 - 12 %    Eosinophils % 4 1 - 4 %    Basophils 1 0 - 2 %    Immature Granulocytes 0 0 %    Segs Absolute 2.41 1.50 - 8.10 k/uL    Absolute Lymph # 1.16 1.10 - 3.70 k/uL    Absolute Mono # 0.48 0.10 - 1.20 k/uL    Absolute Eos # 0.18 0.00 - 0.44 k/uL    Basophils Absolute <0.03 0.00 - 0.20 k/uL    Absolute Immature Granulocyte <0.03 0.00 - 0.30 k/uL   EKG 12 Lead   Result Value Ref Range    Ventricular Rate 62 BPM    Atrial Rate 220 BPM    QRS Duration 106 ms    Q-T Interval 432 ms    QTc Calculation (Bazett) 438 ms    P Axis 26 degrees    R Axis -38 degrees    T Axis 49 degrees   TYPE AND SCREEN   Result Value Ref Range    Expiration Date 01/09/2023,2359     Arm Band Number [id number]     ABO/Rh O POSITIVE     Antibody Screen NEGATIVE      CT HEAD WO CONTRAST    Result Date: 1/7/2023  EXAMINATION: CT OF THE HEAD WITHOUT CONTRAST  1/7/2023 1:46 pm TECHNIQUE: CT of the head was performed without the administration of intravenous contrast. Automated exposure control, iterative reconstruction, and/or weight based adjustment of the mA/kV was utilized to reduce the radiation dose to as low as reasonably achievable. COMPARISON: CT brain 08/13/2021 HISTORY: ORDERING SYSTEM PROVIDED HISTORY: Recurrent vomiting. R/o mass / increased ICP TECHNOLOGIST PROVIDED HISTORY: Recurrent vomiting. R/o mass / increased ICP Reason for Exam: Recurrent vomiting. R/o mass / increased ICP FINDINGS: BRAIN/VENTRICLES: There is no acute infarct or acute intracranial hemorrhage present. There is no mass effect or midline shift present. There is mild diffuse cerebral volume loss. There is mild periventricular hypoattenuation. There is no ventriculomegaly or abnormal extra-axial fluid collection present. ORBITS: Limited evaluation of the orbits is unremarkable. SINUSES: The paranasal sinuses and mastoid air cells are clear. SOFT TISSUES/SKULL:  No lytic or blastic osseous lesions are identified. 1. No acute intracranial process identified. 2. Mild diffuse cerebral volume loss and mild chronic small vessel ischemic changes. CT ABDOMEN PELVIS W IV CONTRAST Additional Contrast? None    Result Date: 1/6/2023  EXAMINATION: CT OF THE ABDOMEN AND PELVIS WITH CONTRAST 1/6/2023 9:58 pm TECHNIQUE: CT of the abdomen and pelvis was performed with the administration of intravenous contrast. Multiplanar reformatted images are provided for review. Automated exposure control, iterative reconstruction, and/or weight based adjustment of the mA/kV was utilized to reduce the radiation dose to as low as reasonably achievable.  COMPARISON: CT of 11/29/2022 HISTORY: ORDERING SYSTEM PROVIDED HISTORY: ill defined fluid collection post op from cholecystectomy october 2022, now with epigastric pain and vomiting, blood tinged TECHNOLOGIST PROVIDED HISTORY: ill defined fluid collection post op from cholecystectomy october 2022, now with epigastric pain and vomiting, blood tinged Decision Support Exception - unselect if not a suspected or confirmed emergency medical condition->Emergency Medical Condition (MA) Reason for Exam: ill defined fluid collection post op from cholecystectomy october 2022, now with epigastric pain and vomiting, blood tinged FINDINGS: LOWER CHEST: 1 cm tubular structure within the right lower lobe which appears to be an impacted bronchus, similar to prior CT examination of 06/27/2022. Visualized portion of the lower chest demonstrates no acute abnormality. Small axial hiatal hernia containing part of the stomach. KIDNEYS AND URINARY TRACT: No renal calculi are identified. There is no evidence for hydronephrosis. The ureters are of normal course and caliber. ORGANS: Status post cholecystectomy with interval decrease in fluid density within the gallbladder fossa now measuring 1.7 x 2.6 cm in comparison with prior examination when it measured 3.1 x 3 cm. Findings are likely suggestive of postsurgical seroma/hematoma which is gradually resolving. Visualized portions of the liver, spleen, pancreas,  and adrenal glands demonstrate no acute abnormality. GI/BOWEL: No bowel obstruction. No evidence of acute appendicitis. Diverticulosis with no evidence of diverticulitis. PELVIS: The bladder and pelvic organs are unremarkable. PERITONEUM/RETROPERITONEUM: No free air or free fluid is noted. No pathologically enlarged lymphadenopathy. The vasculature do not demonstrate acute abnormality. BONES/SOFT TISSUES: The osseous structures demonstrate no acute abnormality. Multilevel disc and facet degenerative disease. At L4-L5, grade 1 anterolisthesis. Appendix is normal.  No obstructive uropathy. Status post cholecystectomy with interval decrease in fluid density within the gallbladder fossa now measuring 1.7 x 2.6 cm in comparison with prior examination when it measured 3.1 x 3 cm. Findings are likely suggestive of postsurgical seroma/hematoma which is gradually resolving. Diverticulosis with no evidence of diverticulitis. Small axial hiatal hernia containing part of the stomach. 1 cm tubular structure within the right lower lobe which appears to be an impacted bronchus, similar to prior CT examination of 06/27/2022. XR CHEST PORTABLE    Result Date: 1/6/2023  EXAMINATION: ONE XRAY VIEW OF THE CHEST 1/6/2023 7:15 pm COMPARISON: 11/28/2022. HISTORY: ORDERING SYSTEM PROVIDED HISTORY: blood tinged emesis TECHNOLOGIST PROVIDED HISTORY: blood tinged emesis FINDINGS: Cardiomediastinal silhouette appears within normal limits. No focal consolidation or overt pulmonary edema. Costophrenic angles are sharp. No evidence of pneumothorax. No acute osseous abnormalities. No radiographic evidence of an acute cardiopulmonary process. Physical Exam:    General appearance - NAD, AOx 3   Lungs -CTAB, no R/R/R  Heart - RRR, no M/R/G  Abdomen - Soft, NT/ND  Neurological:  MAEx4, No focal motor deficit, sensory loss  Extremities - Cap refil <2 sec in all ext., no edema  Skin -warm, dry      Hospital Course:  Clinical course has improved, labs and imaging reviewed. Kamran Crook originally presented to the hospital on 1/6/2023  6:42 PM. with nausea and vomiting as well as epigastric pain. At that time it was determined that He required further observation and further work-up by GI. An EGD performed showed esophageal ulcerations, which is likely causing the pain. He was started on Protonix and Carafate as well as antiemetics. He was admitted and labs and imaging were followed daily. Imaging results as above. He is medically stable to be discharged. Disposition: Home    Patient stated that they will not drive themselves home from the hospital if they have gotten pain killers/ narcotics earlier that day and that they will arrange for transportation on their own or work with the  for a ride.  Patient counseled NOT to drive while under the influence of narcotics/ pain killers. Condition: Good    Patient stable and ready for discharge home. I have discussed plan of care with patient and they are in understanding. They were instructed to read discharge paperwork. All of their questions and concerns were addressed. Time Spent: 5 day      --  Chasidy Graham MD  Emergency Medicine Resident Physician    This dictation was generated by voice recognition computer software. Although all attempts are made to edit the dictation for accuracy, there may be errors in the transcription that are not intended.

## 2023-01-13 NOTE — PROGRESS NOTES
OBS/CDU   RESIDENT NOTE      Patients PCP is:  JW Devlin CNP        SUBJECTIVE      No acute events overnight. Has been able to tolerate a full diet without nausea or vomiting. The patient is urinating on his own and is passing flatus. Denies fever, chills, nausea, vomiting, chest pain, shortness of breath focal weakness, numbness, tingling, urinary/bowel symptoms, vision changes, visual hallucinations, or headache. Patient states that while he may not have had any events overnight he is still experiencing stomach pain. He denies any vomiting since admission. PHYSICAL EXAM      General: NAD, AO X 3  Heent: EMOI, PERRL  Neck: SUPPLE, NO JVD  Cardiovascular: RRR, S1S2  Pulmonary: CTAB, NO SOB  Abdomen: SOFT, ND, +BS, tenderness to palpation over the epigastrium  Extremities: +2/4 PULSES DISTAL, NO SWELLING  Neuro / Psych: NO NUMBNESS OR TINGLING, MENTATION AT BASELINE    PERTINENT TEST /EXAMS      I have reviewed all available laboratory results.     MEDICATIONS CURRENT   pantoprazole (PROTONIX) tablet 40 mg, BID AC  acetaminophen (TYLENOL) tablet 1,000 mg, Q8H PRN  sucralfate (CARAFATE) tablet 1 g, 4 times per day  methyl salicylate-menthol (JOSEPH BRAXTON GREASELESS) 10-15 % cream CREA, TID PRN  sodium chloride flush 0.9 % injection 5-40 mL, 2 times per day  sodium chloride flush 0.9 % injection 5-40 mL, PRN  enoxaparin (LOVENOX) injection 40 mg, Daily  ondansetron (ZOFRAN-ODT) disintegrating tablet 4 mg, Q8H PRN   Or  ondansetron (ZOFRAN) injection 4 mg, Q6H PRN  allopurinol (ZYLOPRIM) tablet 100 mg, Daily  amitriptyline (ELAVIL) tablet 10 mg, Nightly  atorvastatin (LIPITOR) tablet 40 mg, Nightly  carbidopa-levodopa (SINEMET)  MG per tablet 1 tablet, 4x Daily  clopidogrel (PLAVIX) tablet 75 mg, Daily  entacapone (COMTAN) tablet 200 mg, 4x Daily  isosorbide mononitrate (IMDUR) extended release tablet 30 mg, Daily  magnesium oxide (MAG-OX) tablet 400 mg, Daily  rOPINIRole (REQUIP) tablet 0.25 mg, TID  tamsulosin (FLOMAX) capsule 0.4 mg, Daily  metoprolol tartrate (LOPRESSOR) tablet 12.5 mg, BID  prochlorperazine (COMPAZINE) injection 10 mg, Once  diphenhydrAMINE (BENADRYL) injection 25 mg, Once        All medication charted and reviewed. CONSULTS      IP CONSULT TO CASE MANAGEMENT  IP CONSULT TO GI    ASSESSMENT/PLAN       Marlys Mallory is a 79 y.o. male who presents with nausea and vomiting status postcholecystectomy    Continue Protonix twice daily up until 8 weeks, then daily indefinitely  Carafate 4 times a day  Continue antiemetics  Continue home medications and pain control  Monitor vitals, labs, and imaging  DISPO: Discharge today    --  Roberto Carlos Damon MD  Emergency Medicine Resident Physician     This dictation was generated by voice recognition computer software. Although all attempts are made to edit the dictation for accuracy, there may be errors in the transcription that are not intended.

## 2023-02-04 ENCOUNTER — APPOINTMENT (OUTPATIENT)
Dept: CT IMAGING | Age: 83
DRG: 392 | End: 2023-02-04
Payer: MEDICARE

## 2023-02-04 ENCOUNTER — HOSPITAL ENCOUNTER (EMERGENCY)
Age: 83
Discharge: HOME | DRG: 392 | End: 2023-02-04
Attending: EMERGENCY MEDICINE
Payer: MEDICARE

## 2023-02-04 VITALS
HEART RATE: 60 BPM | OXYGEN SATURATION: 97 % | TEMPERATURE: 97.2 F | RESPIRATION RATE: 20 BRPM | DIASTOLIC BLOOD PRESSURE: 66 MMHG | SYSTOLIC BLOOD PRESSURE: 117 MMHG

## 2023-02-04 DIAGNOSIS — K20.90 ESOPHAGITIS: Primary | ICD-10-CM

## 2023-02-04 LAB
ABSOLUTE EOS #: 0.08 K/UL (ref 0–0.44)
ABSOLUTE IMMATURE GRANULOCYTE: <0.03 K/UL (ref 0–0.3)
ABSOLUTE LYMPH #: 1.35 K/UL (ref 1.1–3.7)
ABSOLUTE MONO #: 0.56 K/UL (ref 0.1–1.2)
ALBUMIN SERPL-MCNC: 3.6 G/DL (ref 3.5–5.2)
ALBUMIN/GLOBULIN RATIO: 1.2 (ref 1–2.5)
ALP SERPL-CCNC: 99 U/L (ref 40–129)
ALT SERPL-CCNC: 16 U/L (ref 5–41)
ANION GAP SERPL CALCULATED.3IONS-SCNC: 11 MMOL/L (ref 9–17)
AST SERPL-CCNC: 21 U/L
BASOPHILS # BLD: 0 % (ref 0–2)
BASOPHILS ABSOLUTE: <0.03 K/UL (ref 0–0.2)
BILIRUB SERPL-MCNC: 0.5 MG/DL (ref 0.3–1.2)
BILIRUBIN URINE: NEGATIVE
BUN SERPL-MCNC: 17 MG/DL (ref 8–23)
CALCIUM SERPL-MCNC: 8.9 MG/DL (ref 8.6–10.4)
CASTS UA: NORMAL /LPF (ref 0–8)
CHLORIDE SERPL-SCNC: 101 MMOL/L (ref 98–107)
CO2 SERPL-SCNC: 23 MMOL/L (ref 20–31)
COLOR: YELLOW
CREAT SERPL-MCNC: 1.45 MG/DL (ref 0.7–1.2)
EOSINOPHILS RELATIVE PERCENT: 1 % (ref 1–4)
EPITHELIAL CELLS UA: NORMAL /HPF (ref 0–5)
GFR SERPL CREATININE-BSD FRML MDRD: 48 ML/MIN/1.73M2
GLUCOSE SERPL-MCNC: 144 MG/DL (ref 70–99)
GLUCOSE UR STRIP.AUTO-MCNC: NEGATIVE MG/DL
HCT VFR BLD AUTO: 39.1 % (ref 40.7–50.3)
HGB BLD-MCNC: 13.1 G/DL (ref 13–17)
IMMATURE GRANULOCYTES: 0 %
KETONES UR STRIP.AUTO-MCNC: NEGATIVE MG/DL
LEUKOCYTE ESTERASE UR QL STRIP.AUTO: NEGATIVE
LIPASE SERPL-CCNC: 19 U/L (ref 13–60)
LYMPHOCYTES # BLD: 22 % (ref 24–43)
MCH RBC QN AUTO: 32.3 PG (ref 25.2–33.5)
MCHC RBC AUTO-ENTMCNC: 33.5 G/DL (ref 28.4–34.8)
MCV RBC AUTO: 96.3 FL (ref 82.6–102.9)
MONOCYTES # BLD: 9 % (ref 3–12)
NITRITE UR QL STRIP.AUTO: NEGATIVE
NRBC AUTOMATED: 0 PER 100 WBC
PDW BLD-RTO: 13.9 % (ref 11.8–14.4)
PLATELET # BLD AUTO: 196 K/UL (ref 138–453)
PMV BLD AUTO: 9.2 FL (ref 8.1–13.5)
POTASSIUM SERPL-SCNC: 4.5 MMOL/L (ref 3.7–5.3)
PROT SERPL-MCNC: 6.6 G/DL (ref 6.4–8.3)
PROT UR STRIP.AUTO-MCNC: 7.5 MG/DL (ref 5–8)
PROT UR STRIP.AUTO-MCNC: NEGATIVE MG/DL
RBC # BLD: 4.06 M/UL (ref 4.21–5.77)
RBC CLUMPS #/AREA URNS AUTO: NORMAL /HPF (ref 0–4)
SEG NEUTROPHILS: 68 % (ref 36–65)
SEGMENTED NEUTROPHILS ABSOLUTE COUNT: 4.08 K/UL (ref 1.5–8.1)
SODIUM SERPL-SCNC: 135 MMOL/L (ref 135–144)
SPECIFIC GRAVITY UA: 1.01 (ref 1–1.03)
TURBIDITY: CLEAR
URINE HGB: NEGATIVE
UROBILINOGEN, URINE: NORMAL
WBC # BLD AUTO: 6.1 K/UL (ref 3.5–11.3)
WBC UA: NORMAL /HPF (ref 0–5)

## 2023-02-04 PROCEDURE — 74177 CT ABD & PELVIS W/CONTRAST: CPT

## 2023-02-04 PROCEDURE — 81001 URINALYSIS AUTO W/SCOPE: CPT

## 2023-02-04 PROCEDURE — 85025 COMPLETE CBC W/AUTO DIFF WBC: CPT

## 2023-02-04 PROCEDURE — 2580000003 HC RX 258

## 2023-02-04 PROCEDURE — 80053 COMPREHEN METABOLIC PANEL: CPT

## 2023-02-04 PROCEDURE — 6370000000 HC RX 637 (ALT 250 FOR IP)

## 2023-02-04 PROCEDURE — 6360000002 HC RX W HCPCS

## 2023-02-04 PROCEDURE — 6360000004 HC RX CONTRAST MEDICATION

## 2023-02-04 PROCEDURE — 83690 ASSAY OF LIPASE: CPT

## 2023-02-04 RX ORDER — 0.9 % SODIUM CHLORIDE 0.9 %
1000 INTRAVENOUS SOLUTION INTRAVENOUS ONCE
Status: COMPLETED | OUTPATIENT
Start: 2023-02-04 | End: 2023-02-04

## 2023-02-04 RX ORDER — ONDANSETRON 2 MG/ML
INJECTION INTRAMUSCULAR; INTRAVENOUS
Status: COMPLETED
Start: 2023-02-04 | End: 2023-02-04

## 2023-02-04 RX ORDER — SUCRALFATE 1 G/1
1 TABLET ORAL 4 TIMES DAILY
Qty: 120 TABLET | Refills: 0 | Status: ON HOLD | OUTPATIENT
Start: 2023-02-04 | End: 2023-02-07 | Stop reason: SDUPTHER

## 2023-02-04 RX ORDER — ONDANSETRON 2 MG/ML
4 INJECTION INTRAMUSCULAR; INTRAVENOUS ONCE
Status: COMPLETED | OUTPATIENT
Start: 2023-02-04 | End: 2023-02-04

## 2023-02-04 RX ORDER — FAMOTIDINE 20 MG/1
20 TABLET, FILM COATED ORAL ONCE
Status: COMPLETED | OUTPATIENT
Start: 2023-02-04 | End: 2023-02-04

## 2023-02-04 RX ORDER — ONDANSETRON 4 MG/1
4 TABLET, ORALLY DISINTEGRATING ORAL 3 TIMES DAILY PRN
Qty: 15 TABLET | Refills: 0 | Status: SHIPPED | OUTPATIENT
Start: 2023-02-04 | End: 2023-02-09

## 2023-02-04 RX ORDER — SUCRALFATE 1 G/1
1 TABLET ORAL ONCE
Status: COMPLETED | OUTPATIENT
Start: 2023-02-04 | End: 2023-02-04

## 2023-02-04 RX ORDER — PANTOPRAZOLE SODIUM 20 MG/1
40 TABLET, DELAYED RELEASE ORAL 2 TIMES DAILY
Qty: 30 TABLET | Refills: 0 | Status: ON HOLD | OUTPATIENT
Start: 2023-02-04 | End: 2023-02-07 | Stop reason: HOSPADM

## 2023-02-04 RX ORDER — LIDOCAINE HYDROCHLORIDE 20 MG/ML
15 SOLUTION OROPHARYNGEAL ONCE
Status: COMPLETED | OUTPATIENT
Start: 2023-02-04 | End: 2023-02-04

## 2023-02-04 RX ORDER — MAGNESIUM HYDROXIDE/ALUMINUM HYDROXICE/SIMETHICONE 120; 1200; 1200 MG/30ML; MG/30ML; MG/30ML
30 SUSPENSION ORAL ONCE
Status: COMPLETED | OUTPATIENT
Start: 2023-02-04 | End: 2023-02-04

## 2023-02-04 RX ADMIN — ONDANSETRON 4 MG: 2 INJECTION INTRAMUSCULAR; INTRAVENOUS at 14:01

## 2023-02-04 RX ADMIN — LIDOCAINE HYDROCHLORIDE 15 ML: 20 SOLUTION ORAL; TOPICAL at 14:00

## 2023-02-04 RX ADMIN — FAMOTIDINE 20 MG: 20 TABLET, FILM COATED ORAL at 14:02

## 2023-02-04 RX ADMIN — ALUMINUM HYDROXIDE, MAGNESIUM HYDROXIDE, AND SIMETHICONE 30 ML: 200; 200; 20 SUSPENSION ORAL at 14:00

## 2023-02-04 RX ADMIN — SODIUM CHLORIDE 1000 ML: 9 INJECTION, SOLUTION INTRAVENOUS at 14:01

## 2023-02-04 RX ADMIN — SUCRALFATE 1 G: 1 TABLET ORAL at 18:00

## 2023-02-04 RX ADMIN — IOPAMIDOL 75 ML: 755 INJECTION, SOLUTION INTRAVENOUS at 16:11

## 2023-02-04 ASSESSMENT — PAIN SCALES - GENERAL: PAINLEVEL_OUTOF10: 9

## 2023-02-04 ASSESSMENT — PAIN - FUNCTIONAL ASSESSMENT: PAIN_FUNCTIONAL_ASSESSMENT: 0-10

## 2023-02-04 ASSESSMENT — PAIN DESCRIPTION - LOCATION: LOCATION: ABDOMEN

## 2023-02-04 NOTE — ED PROVIDER NOTES
[facility]     Emergency Department     Faculty Attestation    I performed a history and physical examination of the patient and discussed management with the resident. I reviewed the residents note and agree with the documented findings including all diagnostic interpretations and plan of care. Any areas of disagreement are noted on the chart. I was personally present for the key portions of any procedures. I have documented in the chart those procedures where I was not present during the key portions. I have reviewed the emergency nurses triage note. I agree with the chief complaint, past medical history, past surgical history, allergies, medications, social and family history as documented unless otherwise noted below. Documentation of the HPI, Physical Exam and Medical Decision Making performed by scribpaige is based on my personal performance of the HPI, PE and MDM. For Physician Assistant/ Nurse Practitioner cases/documentation I have personally evaluated this patient and have completed at least one if not all key elements of the E/M (history, physical exam, and MDM). Additional findings are as noted. Primary Care Physician: JW Berg - CNP    VITAL SIGNS:   oral temperature is 97.2 °F (36.2 °C). His blood pressure is 117/66 and his pulse is 60. His respiration is 20 and oxygen saturation is 97%. Medical Decision Making  Abdominal pain. Predominantly periumbilical to epigastric. History of similar presentation with known history of esophagitis/gastritis high-grade. Recent scope by GI in the past 2 months. CT scan middle of last month showed no acute findings. On examination he appears uncomfortable, cardiac exam regular rate and rhythm no murmurs rubs gallops, pulmonary clear bilaterally abdomen is soft, tender in the periumbilical and epigastric regions. Impression recurrence of symptoms for gastritis/esophagitis.   He is status postcholecystectomy.  Will obtain abdominal labs.  Symptomatic treatment.    Amount and/or Complexity of Data Reviewed  External Data Reviewed: labs, radiology and notes.  Labs: ordered. Decision-making details documented in ED Course.  Radiology: ordered.    Risk  OTC drugs.  Prescription drug management.        Isidro Davis MD, FACEP, FAAEM  Attending Emergency Physician         Isidro Davis MD  02/04/23 [id number]

## 2023-02-04 NOTE — ED PROVIDER NOTES
Faculty Sign-Out Attestation  Handoff taken on the following patient from prior Attending Physician: Ike Giron    I was available and discussed any additional care issues that arose and coordinated the management plans with the resident(s) caring for the patient during my duty period. Any areas of disagreement with residents documentation of care or procedures are noted on the chart. I was personally present for the key portions of any/all procedures during my duty period. I have documented in the chart those procedures where I was not present during the key portions. 79-year-old male with cholecystectomy at the end of 2022 presenting with persistent abdominal pain. Patient has had esophagitis and gastritis on most recent upper GI scope. Labs pending. Anticipate discharge if improving. 3:25 PM EST patient reevaluated by resident, still having severe abdominal pain. Reviewed last CT, patient had a postsurgical seroma/hematoma on last CT scan in January. We will repeat CT to ensure no abscess formation and no perforation.     Manisha Cartagena MD  Attending Physician       Manisha Cartagena MD  02/04/23 [id number]

## 2023-02-04 NOTE — DISCHARGE INSTRUCTIONS
You were seen for evaluation of burning. You were diagnosed with esophagitis last time you were seen you will be given the prescription you are supposed to take for your esophagitis including Protonix, Carafate, Zofran. You need to fill these prescriptions and take them every day. You need to follow-up with your primary care provider on Monday at your scheduled appointment. You should return to the emergency department immediately for any worsening chest pain, shortness of breath ,inability to eat or drink, or other new, concerning symptoms.

## 2023-02-04 NOTE — DISCHARGE INSTR - COC
Continuity of Care Form    Patient Name: Juan Carlos Mcgraw   :  1940  MRN:  [id number]    Admit date:  2023  Discharge date:  ***    Code Status Order: Prior   Advance Directives:     Admitting Physician:  No admitting provider for patient encounter.   PCP: JW Lindsay CNP    Discharging Nurse: [facility] Unit/Room#:   Discharging Unit Phone Number: ***    Emergency Contact:   Extended Emergency Contact Information  Primary Emergency Contact: Jhony Gill  Address: [address] Phone: [phone]  Relation: Child  Secondary Emergency Contact: Dave Mccarthy  Address: [address] Phone: [phone]  Mobile Phone: [phone]  Relation: Other    Past Surgical History:  Past Surgical History:   Procedure Laterality Date    CHOLECYSTECTOMY, LAPAROSCOPIC N/A 10/29/2022    LAPROSCOPIC CHOLECYSTECTOMY performed by Compa Dickerson DO at [address] [place] [address]  2012    poor prep, tubular adenoma    COLONOSCOPY  2017    diverticulosis, multiple polyps as described, spot marking done, pathology-tubular adenoma x 4    ESOPHAGOGASTRODUODENOSCOPY  2023    [place] COLSC FLX W/RMVL OF TUMOR POLYP LESION SNARE TQ N/A 2017    COLONOSCOPY POLYPECTOMY SNARE/COLD BIOPSY with tatooing and apc transverse colon performed by Ap Neff MD at [address] [place] [address] Right     rotator cuff    UPPER GASTROINTESTINAL ENDOSCOPY N/A 2023    EGD ESOPHAGOGASTRODUODENOSCOPY performed by Dana Gautam MD at [address] [place] [address] History:   Immunization History   Administered Date(s) Administered    COVID-19, PFIZER Bivalent BOOSTER, DO NOT Dilute, (age 12y+), IM, 30 mcg/0.3 mL 2022    COVID-19, PFIZER GRAY top, DO NOT Dilute, (age 15 y+), IM, 30 mcg/0.3 mL 2022    COVID-19, PFIZER PURPLE top, DILUTE for use, (age 15 y+), 30mcg/0.3mL 2021, 2021, 2021    Influenza 2012    Influenza, FLUAD, (age 72 y+), Adjuvanted, 0.5mL 10/19/2020    Influenza, High Dose (Fluzone 65 yrs and older) 09/22/2014, 12/05/2016, 08/07/2017, 12/19/2017    Influenza, Triv, inactivated, subunit, adjuvanted, IM (Fluad 65 yrs and older) 08/23/2018, 10/06/2019    Pneumococcal Conjugate 13-valent (Suad Morale) 06/14/2017, 05/18/2018, 05/18/2018    Pneumococcal Polysaccharide (Isagmnwgv87) 09/25/2012, 12/04/2015       Active Problems:  Patient Active Problem List   Diagnosis Code    Temporary loss of eyesight H53.129    Syncope : posterior circulation stroke vs Neurocardiogenic syncope  R55    Intracranial bleed : traumatic left sub-dural hematoma ,managed conservatively in 2011 I62.9    Headache R51.9    CKD (chronic kidney disease) stage 3, GFR 30-59 ml/min ([facility]) N18.30    Depression F32. A    Hyperlipidemia E78.5    Microhematuria R31.29    Microalbuminuria R80.9    Essential hypertension I10    Generalized abdominal pain R10.84    Tubular adenoma of colon D12.6    Diverticulosis of colon K57.30    History of skull fracture Z87.81    Nausea R11.0    Pure hypercholesterolemia E78.00    Prediabetes R73.03    Parkinson disease ([facility] [address] G20    Chronic post-traumatic headache, not intractable G44.329    Fall (on) (from) other stairs and steps, initial encounter W10. 8XXA    Strain of iliopsoas muscle, initial encounter S76.919A    Chronic pain of left knee M25.562, G89.29    Closed fracture of second toe of right foot S92.501A    Closed fracture of second toe of left foot S92.502A    Abnormal ECG R94.31    Cerebrovascular accident [facility]) I63.9    Chest pain, unspecified R07.9    Hematoma of scalp S00. 03XA    Left ventricular hypertrophy I51.7    Mild aortic valve regurgitation I35.1    Pulmonary hypertension, mild (HCC) I27.20    Lumbar radiculopathy M54.16    Dizziness R42    Hyponatremia E87.1    Vomiting R11.10    General weakness R53.1    Overweight (BMI 25.0-29. 9) E66.3    Frequent falls R29.6    Symptomatic bradycardia R00.1 Unspecified inflammatory spondylopathy, lumbar region [facility]) M46.96    Stage 3b chronic kidney disease (CKD) ([facility]) N18.32    Chronic pain of multiple joints M25.50, G89.29    Multiple comorbid conditions R69    Gout M10.9    Nerve pain M79.2    NSTEMI (non-ST elevated myocardial infarction) ([facility]) I21.4    History of subdural hematoma Z86.79    Coronary artery disease involving native heart with angina pectoris ([facility]) I25.119    Lumbar facet arthropathy M47.816    Spinal stenosis of lumbar region without neurogenic claudication M48.061    Generalized weakness R53.1    Lumbosacral spondylosis without myelopathy M47.817    Gallstone pancreatitis K85.10    Calculus of gallbladder with acute cholecystitis without obstruction K80.00    Bandemia D72.825    Pulmonary nodule R91.1    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    Acute cholecystitis K81.0    TAMARA (acute kidney injury) ([facility] [address] N17.9    History of non-ST elevation myocardial infarction (NSTEMI) 6/2022 I25.2    Postoperative retention of urine N99.89, R33.8    History of coronary artery stent placement Z95.5    Nausea & vomiting R11.2    Intractable nausea and vomiting R11.2    Epigastric pain R10.13    Hyperemesis R11.10       Isolation/Infection:   Isolation            No Isolation          Patient Infection Status       Infection Onset Added Last Indicated Last Indicated By Review Planned Expiration Resolved Resolved By    MRSA  09/25/15 09/25/15 Aziza Painter RN        Leg 9/23/15    Resolved    COVID-19 (Rule Out) 01/06/23 01/06/23 01/06/23 COVID-19, Rapid (Ordered)   01/06/23 Rule-Out Test Resulted    COVID-19 (Rule Out) 10/25/22 10/25/22 10/25/22 COVID-19, Rapid (Ordered)   10/25/22 Rule-Out Test Resulted    COVID-19 (Rule Out) 09/01/21 09/01/21 09/01/21 COVID-19, Rapid (Ordered)   09/01/21 Rule-Out Test Resulted            Nurse Assessment:  Last Vital Signs: /66   Pulse 60   Temp 97.2 °F (36.2 °C) (Oral)   Resp 20   SpO2 97%     Last documented pain score (0-10 scale): Pain Level: 9  Last Weight:   Wt Readings from Last 1 Encounters:   23 180 lb (81.6 kg)     Mental Status:  {IP PT MENTAL STATUS:99785}    IV Access:  { JOVANNA IV ACCESS:030267778}    Nursing Mobility/ADLs:  Walking   {CHP DME GLSL:148732491}  Transfer  {CHP DME KORV:702217577}  Bathing  {CHP DME PLHL:067728368}  Dressing  {CHP DME UHNU:053205367}  Toileting  {CHP DME FPEL:036565879}  Feeding  {CHP DME OBBR:649491655}  Med Admin  {CHP DME QHJQ:998088781}  Med Delivery   { JOVANNA MED Delivery:864362930}    Wound Care Documentation and Therapy:        Elimination:  Continence: Bowel: {YES / ZA:74843}  Bladder: {YES / EI:89190}  Urinary Catheter: {Urinary Catheter:971547467}   Colostomy/Ileostomy/Ileal Conduit: {YES / JJ:30124}       Date of Last BM: ***  No intake or output data in the 24 hours ending 23 1542  No intake/output data recorded.     Safety Concerns:     [address] Safety Concerns:705129792}    Impairments/Disabilities:      [address] Impairments/Disabilities:179590559}    Nutrition Therapy:  Current Nutrition Therapy:   [address] Diet List:465479993}    Routes of Feeding: {CHP DME Other Feedings:590638108}  Liquids: {Slp liquid thickness:93956}  Daily Fluid Restriction: {CHP DME Yes amt example:143186773}  Last Modified Barium Swallow with Video (Video Swallowing Test): {Done Not Done OJLX:043560298}    Treatments at the Time of Hospital Discharge:   Respiratory Treatments: ***  Oxygen Therapy:  {Therapy; copd oxygen:22465}  Ventilator:    {[facility] Vent PSWA:297704113}    Rehab Therapies: {THERAPEUTIC INTERVENTION:6143953559}  Weight Bearing Status/Restrictions: [address]  Weight Bearin}  Other Medical Equipment (for information only, NOT a DME order):  {EQUIPMENT:473605966}  Other Treatments: ***    Patient's personal belongings (please select all that are sent with patient):  {JUAN DME Belongings:955173795}    RN SIGNATURE:  {Esignature:930347238}    CASE MANAGEMENT/SOCIAL WORK SECTION    Inpatient Status Date: ***    Readmission Risk Assessment Score:  Readmission Risk              Risk of Unplanned Readmission:  0           Discharging to Facility/ Agency   Name:   Address:  Phone:  Fax:    Dialysis Facility (if applicable)   Name:  Address:  Dialysis Schedule:  Phone:  Fax:    / signature: {Esignature:425382047}    PHYSICIAN SECTION    Prognosis: {Prognosis:3882850998}    Condition at Discharge: [facility] Patient Condition:930421569}    Rehab Potential (if transferring to Rehab): {Prognosis:2315105206}    Recommended Labs or Other Treatments After Discharge: ***    Physician Certification: I certify the above information and transfer of Zaid Lee  is necessary for the continuing treatment of the diagnosis listed and that he requires {Admit to Appropriate Level of Care:36308} for {GREATER/LESS:072960477} 30 days.      Update Admission H&P: {CHP DME Changes in DTCSR:940944098}    PHYSICIAN SIGNATURE:  {Esignature:354764992}

## 2023-02-04 NOTE — ED PROVIDER NOTES
[facility]  ED  Emergency Department Encounter  Emergency Medicine Resident     Pt Name:Mina Quinn Stage  MRN: [id number]  [place] 1940  Date of evaluation: 2/4/23  PCP:  WJ Hooks CNP  Note Started: 1:52 PM EST      CHIEF COMPLAINT       Chief Complaint   Patient presents with    Abdominal Pain     Cholecystectomy s/p 1 mo; states difficulty swallowing       HISTORY OF PRESENT ILLNESS  (Location/Symptom, Timing/Onset, Context/Setting, Quality, Duration, Modifying Factors, Severity.)      Harpreet Richards is a 79 y.o. male with history of Parkinson's disease, esophagitis, coronary artery disease, chronic kidney disease who presents with upper abdominal pain and vomiting that has been worsening over the past week. Patient states he recently had his gallbladder removed and since then he has been having states this pain came on acutely this morning. States he felt nauseous and threw up at home. Nonbloody. Was recently admitted to the observation unit at this hospital, was seen by GI and was diagnosed with grade D esophagitis with ulcerations. Patient was dc'd to home with protonix and carafate which he states he has not been taking. Notes recent difficulty swallowing. 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        has a past surgical history that includes Colonoscopy (11/02/2012); shoulder surgery (Right); pr colsc flx w/rmvl of tumor polyp lesion snare tq (N/A, 09/19/2017); Colonoscopy (09/19/2017); Cholecystectomy, laparoscopic (N/A, 10/29/2022); Esophagogastroduodenoscopy (01/09/2023); and Upper gastrointestinal endoscopy (N/A, 1/9/2023).      Social History     Socioeconomic History    Marital status: Single     Spouse name: Not on file    Number of children: Not on file    Years of education: Not on file    Highest education level: Not on file   Occupational History    Not on file   Tobacco Use    Smoking status: Former    Smokeless tobacco: Never   Vaping Use    Vaping Use: Never used   Substance and Sexual Activity    Alcohol use: No    Drug use: No    Sexual activity: Not Currently   Other Topics Concern    Not on file   Social History Narrative    Not on file     Social Determinants of Health     Financial Resource Strain: Low Risk     Difficulty of Paying Living Expenses: Not hard at all   Food Insecurity: No Food Insecurity    Worried About Running Out of Food in the Last Year: Never true    Ran Out of Food in the Last Year: Never true   Transportation Needs: No Transportation Needs    Lack of Transportation (Medical): No    Lack of Transportation (Non-Medical): No   Physical Activity: Inactive    Days of Exercise per Week: 0 days    Minutes of Exercise per Session: 0 min   Stress: Stress Concern Present    Feeling of Stress : To some extent   Social Connections: Socially Isolated    Frequency of Communication with Friends and Family: Three times a week    Frequency of Social Gatherings with Friends and Family: Three times a week    Attends Hinduism Services: Never    Active Member of Clubs or Organizations: No    Attends Club or Organization Meetings: Never    Marital Status:    Intimate Partner Violence: Not At Risk    Fear of Current or Ex-Partner: No    Emotionally Abused: No    Physically Abused: No    Sexually Abused: No   Housing Stability: Low Risk     Unable to  Pay for Housing in the Last Year: No    Number of Places Lived in the Last Year: 1    Unstable Housing in the Last Year: No       Family History   Problem Relation Age of Onset    No Known Problems Mother     No Known Problems Father        Allergies:  Aspirin    Home Medications:  Prior to Admission medications    Medication Sig Start Date End Date Taking?  Authorizing Provider   pantoprazole (PROTONIX) 20 MG tablet Take 2 tablets by mouth in the morning and at bedtime 2/4/23  Yes Eve Mask, DO   ondansetron (ZOFRAN-ODT) 4 MG disintegrating tablet Take 1 tablet by mouth 3 times daily as needed for Nausea or Vomiting 2/4/23 2/9/23 Yes Eve Mask, DO   sucralfate (CARAFATE) 1 GM tablet Take 1 tablet by mouth 4 times daily 2/4/23 3/6/23 Yes Eve Mask, DO   sucralfate (CARAFATE) 1 GM tablet Take 1 tablet by mouth 4 times daily 1/12/23   Qing Albright MD   pantoprazole (PROTONIX) 40 MG tablet Take 1 tablet by mouth 2 times daily (before meals) 1/12/23 2/11/23  Qing Albright MD   ibuprofen (ADVIL;MOTRIN) 800 MG tablet Take 1 tablet by mouth 2 times daily as needed for Pain 11/1/22   Sina Menon MD   tamsulosin (FLOMAX) 0.4 MG capsule Take 1 capsule by mouth daily 10/31/22   Betsy Dash DO   gabapentin (NEURONTIN) 100 MG capsule TAKE 1 CAPSULE BY MOUTH 3 TIMES DAILY FOR NERVES 9/22/22 10/22/22  JW Andrews CNP   pantoprazole (PROTONIX) 40 MG tablet TAKE 1 TABLET BY MOUTH ONCE DAILY FOR GERD 8/23/22   JW Andrews - CNP   isosorbide mononitrate (IMDUR) 30 MG extended release tablet TAKE 1 TABLET BY MOUTH ONCE DAILY FOR HYPERTENSION 8/23/22   JW Andrews - CNP   metoprolol tartrate (LOPRESSOR) 25 MG tablet TAKE ONE HALF (1/2) ATBLETS = 12.5MG BY MOUTH TWICE A DAY FOR HYPERTENSION 8/23/22   JW Andrews - CNP   carbidopa-levodopa (SINEMET)  MG per tablet TAKE ONE (1) TABLET BY MOUTH FOUR (4) TIMES DAILY 8/9/22   JW Andrews - CNP   rOPINIRole (REQUIP) 0.25 MG tablet TAKE ONE (1) TABLET BY MOUTH THREE (3) TIMES DAILY 8/8/22   JW Brock CNP   atorvastatin (LIPITOR) 40 MG tablet take 1 tablet by mouth once daily 8/4/22   JW Brock CNP   entacapone (COMTAN) 200 MG tablet TAKE ONE (1) TABLET BY MOUTH FOUR (4) TIMES DAILY WITH SINEMET 6/21/22   Shantelle Akers MD   nitroGLYCERIN (NITROSTAT) 0.4 MG SL tablet up to max of 3 total doses. If no relief after 1 dose, call 911. 6/9/22   JW Marcos NP   magnesium oxide (MAG-OX) 400 (240 Mg) MG tablet TAKE 1 TABLET BY MOUTH ONCE DAILY 5/23/22   JW Brock CNP   diclofenac sodium (VOLTAREN) 1 % GEL Apply 2 g topically 2 times daily as needed for Pain (joint pain) 5/9/22   JW Irby NP   amitriptyline (ELAVIL) 10 MG tablet Take 1 tablet by mouth nightly 4/5/22   Gardenia Finley MD   clopidogrel (PLAVIX) 75 MG tablet TAKE 1 TABLET BY MOUTH ONCE DAILY  11/5/21   JW Bedolla CNP   allopurinol (ZYLOPRIM) 100 MG tablet TAKE 1 TABLET BY MOUTH ONCE DAILY  11/5/21   JW Bedolla CNP   acetaminophen (TYLENOL 8 HOUR ARTHRITIS PAIN) 650 MG extended release tablet Take 1 tablet by mouth every 8 hours as needed for Pain 6/14/21   JW Brock CNP   butalbital-acetaminophen-caffeine (FIORICET, ESGIC) -40 MG per tablet Take 1 tab prn only for severe headache. Can repeat after 4 hrs if needed. Max 2 tabs/24 hrs. Max 4 tabs/week 6/14/21   JW Brock CNP         REVIEW OF SYSTEMS       Review of Systems   Constitutional:  Negative for chills and fever. Gastrointestinal:  Positive for abdominal pain and nausea. PHYSICAL EXAM      INITIAL VITALS:   /66   Pulse 60   Temp 97.2 °F (36.2 °C) (Oral)   Resp 20   SpO2 97%     Physical Exam  Vitals and nursing note reviewed. Constitutional:       General: He is not in acute distress. Appearance: He is well-developed. HENT:      Head: Normocephalic and atraumatic.    Cardiovascular:      Rate and Rhythm: Normal rate and regular rhythm. Pulmonary:      Effort: Pulmonary effort is normal. No respiratory distress. Abdominal:      General: Abdomen is flat. Palpations: Abdomen is soft. Tenderness: There is abdominal tenderness in the right upper quadrant and epigastric area. There is no right CVA tenderness or left CVA tenderness. Skin:     General: Skin is warm and dry. Neurological:      General: No focal deficit present. Mental Status: He is alert and oriented to person, place, and time. Comments: Pill rolling tremor         DDX/DIAGNOSTIC RESULTS / EMERGENCY DEPARTMENT COURSE / MDM     Medical Decision Making  Patient with h/o esophagitis and parkinsons disease presents with burning upper abdominal pain that has been worsening over the past week. States he feels nauseous and has had difficulty swallowing. Recent stay in observation unit where he was diagnosed with esophagitis and esophageal ulcers. States he has not been taking his home medications for esophagitis including protonix and carafate. DDX: uncontrolled esophagitis, perforated ulcer, worsening post-surgical seroma of gallbladder, pancreatitis, ACS    Amount and/or Complexity of Data Reviewed  External Data Reviewed: labs and notes. Labs: ordered. Decision-making details documented in ED Course. Radiology: ordered. Risk  OTC drugs. Prescription drug management. Decision regarding hospitalization. Risk Details: Patient with epigastric pain s/p esophagitis dx. Patient given GI cocktail in ED with zofran with some relief of symptoms. Patient with continued pain so CT scan of abdomen and carafate ordered. CT abdomen pelvis without acute changes. Discussed with patient DC to home with prescriptions for esophagitis including protonix and carafate. Discussed return precautions.  Patient expressed understanding            EMERGENCY DEPARTMENT COURSE:      ED Course as of 02/05/23 1026   Sat Feb 04, 2023   1434 CBC with no leukocytosis or anemia [TD]   1512 Creatinine(!): 1.45 [TD]   1524 Patient with continued pain after medication administration. Will get CT abdomen pelvis d/t concerns for worsening post surgical seroma or erosion of upper GI tract [TD]   1700 CT abdomen pelvis without acute intra-abdominal changes. No significant change in gallbladder from prior CT study [TD]   1721 Patient states he still having. Patient does state he has not been taking his protonix and sucralfate as prescribed. Patient did leave the observation unit on January 13 and spent a couple weeks at [facility] for rehab. Patient now lives in a private house alone [TD]   3367 Will attempt a dose of sucralfate for continued GI symptoms. [TD]      ED Course User Index  [TD] Fabi Forrest DO       CONSULTS:  None        FINAL IMPRESSION      1. Esophagitis          DISPOSITION / PLAN     DISPOSITION Decision To Discharge 02/04/2023 06:36:37 PM      PATIENT REFERRED TO:  JW Cabral - CNP  [address] [facility]  [address] [facility] [address]  [address] [facility] [address]  [phone]      Follow up on Monday    DISCHARGE MEDICATIONS:  Discharge Medication List as of 2/4/2023  6:53 PM        START taking these medications    Details   ! ! pantoprazole (PROTONIX) 20 MG tablet Take 2 tablets by mouth in the morning and at bedtime, Disp-30 tablet, R-0Print      ondansetron (ZOFRAN-ODT) 4 MG disintegrating tablet Take 1 tablet by mouth 3 times daily as needed for Nausea or Vomiting, Disp-15 tablet, R-0Print      !! sucralfate (CARAFATE) 1 GM tablet Take 1 tablet by mouth 4 times daily, Disp-120 tablet, R-0Print       !! - Potential duplicate medications found. Please discuss with provider.           Fabi Forrest DO  Emergency Medicine Resident    (Please note that portions of thisnote were completed with a voice recognition program.  Efforts were made to edit the dictations but occasionally words are mis-transcribed.)      Fabi Forrest DO  Resident  02/05/23 3689

## 2023-02-04 NOTE — ED NOTES
Pt presented to ED via triage for the complaints of abd pain. Pt states he has hx of ulcers. Pt alert and oriented x 4. RR even and non labored.      Sulma Castorena RN  02/04/23 6567

## 2023-02-05 ENCOUNTER — HOSPITAL ENCOUNTER (INPATIENT)
Age: 83
LOS: 1 days | Discharge: OTHER INSTITUTION | DRG: 392 | End: 2023-02-07
Attending: EMERGENCY MEDICINE | Admitting: EMERGENCY MEDICINE
Payer: MEDICARE

## 2023-02-05 ENCOUNTER — APPOINTMENT (OUTPATIENT)
Dept: GENERAL RADIOLOGY | Age: 83
DRG: 392 | End: 2023-02-05
Payer: MEDICARE

## 2023-02-05 DIAGNOSIS — K20.90 ESOPHAGITIS: Primary | ICD-10-CM

## 2023-02-05 LAB
ALBUMIN SERPL-MCNC: 4.5 G/DL (ref 3.5–5.2)
ALBUMIN/GLOBULIN RATIO: 1.3 (ref 1–2.5)
ALP SERPL-CCNC: 119 U/L (ref 40–129)
ALT SERPL-CCNC: 26 U/L (ref 5–41)
ANION GAP SERPL CALCULATED.3IONS-SCNC: 12 MMOL/L (ref 9–17)
AST SERPL-CCNC: 27 U/L
BILIRUB SERPL-MCNC: 0.5 MG/DL (ref 0.3–1.2)
BUN SERPL-MCNC: 14 MG/DL (ref 8–23)
CALCIUM SERPL-MCNC: 9.4 MG/DL (ref 8.6–10.4)
CHLORIDE SERPL-SCNC: 97 MMOL/L (ref 98–107)
CO2 SERPL-SCNC: 24 MMOL/L (ref 20–31)
CREAT SERPL-MCNC: 1.3 MG/DL (ref 0.7–1.2)
GFR SERPL CREATININE-BSD FRML MDRD: 55 ML/MIN/1.73M2
GLUCOSE SERPL-MCNC: 127 MG/DL (ref 70–99)
HCT VFR BLD AUTO: 45.8 % (ref 40.7–50.3)
HGB BLD-MCNC: 15.2 G/DL (ref 13–17)
LIPASE SERPL-CCNC: 22 U/L (ref 13–60)
MAGNESIUM SERPL-MCNC: 2.3 MG/DL (ref 1.6–2.6)
MCH RBC QN AUTO: 32.1 PG (ref 25.2–33.5)
MCHC RBC AUTO-ENTMCNC: 33.2 G/DL (ref 28.4–34.8)
MCV RBC AUTO: 96.6 FL (ref 82.6–102.9)
NRBC AUTOMATED: 0 PER 100 WBC
PDW BLD-RTO: 14.1 % (ref 11.8–14.4)
PLATELET # BLD AUTO: 218 K/UL (ref 138–453)
PMV BLD AUTO: 8.9 FL (ref 8.1–13.5)
POTASSIUM SERPL-SCNC: 3.7 MMOL/L (ref 3.7–5.3)
PROT SERPL-MCNC: 8 G/DL (ref 6.4–8.3)
RBC # BLD: 4.74 M/UL (ref 4.21–5.77)
SARS-COV-2 RDRP RESP QL NAA+PROBE: NOT DETECTED
SODIUM SERPL-SCNC: 133 MMOL/L (ref 135–144)
SPECIMEN DESCRIPTION: NORMAL
TROPONIN I SERPL DL<=0.01 NG/ML-MCNC: 25 NG/L (ref 0–22)
TROPONIN I SERPL DL<=0.01 NG/ML-MCNC: 26 NG/L (ref 0–22)
WBC # BLD AUTO: 6 K/UL (ref 3.5–11.3)

## 2023-02-05 PROCEDURE — 83735 ASSAY OF MAGNESIUM: CPT

## 2023-02-05 PROCEDURE — 71046 X-RAY EXAM CHEST 2 VIEWS: CPT

## 2023-02-05 PROCEDURE — G0378 HOSPITAL OBSERVATION PER HR: HCPCS

## 2023-02-05 PROCEDURE — 87635 SARS-COV-2 COVID-19 AMP PRB: CPT

## 2023-02-05 PROCEDURE — 84484 ASSAY OF TROPONIN QUANT: CPT

## 2023-02-05 PROCEDURE — 83690 ASSAY OF LIPASE: CPT

## 2023-02-05 PROCEDURE — 6370000000 HC RX 637 (ALT 250 FOR IP): Performed by: STUDENT IN AN ORGANIZED HEALTH CARE EDUCATION/TRAINING PROGRAM

## 2023-02-05 PROCEDURE — 96374 THER/PROPH/DIAG INJ IV PUSH: CPT

## 2023-02-05 PROCEDURE — 2580000003 HC RX 258: Performed by: STUDENT IN AN ORGANIZED HEALTH CARE EDUCATION/TRAINING PROGRAM

## 2023-02-05 PROCEDURE — 2580000003 HC RX 258: Performed by: EMERGENCY MEDICINE

## 2023-02-05 PROCEDURE — 93005 ELECTROCARDIOGRAM TRACING: CPT | Performed by: STUDENT IN AN ORGANIZED HEALTH CARE EDUCATION/TRAINING PROGRAM

## 2023-02-05 PROCEDURE — 6370000000 HC RX 637 (ALT 250 FOR IP): Performed by: EMERGENCY MEDICINE

## 2023-02-05 PROCEDURE — 6360000002 HC RX W HCPCS: Performed by: STUDENT IN AN ORGANIZED HEALTH CARE EDUCATION/TRAINING PROGRAM

## 2023-02-05 PROCEDURE — 99285 EMERGENCY DEPT VISIT HI MDM: CPT

## 2023-02-05 PROCEDURE — 85027 COMPLETE CBC AUTOMATED: CPT

## 2023-02-05 PROCEDURE — 96375 TX/PRO/DX INJ NEW DRUG ADDON: CPT

## 2023-02-05 PROCEDURE — A4216 STERILE WATER/SALINE, 10 ML: HCPCS | Performed by: STUDENT IN AN ORGANIZED HEALTH CARE EDUCATION/TRAINING PROGRAM

## 2023-02-05 PROCEDURE — C9113 INJ PANTOPRAZOLE SODIUM, VIA: HCPCS | Performed by: STUDENT IN AN ORGANIZED HEALTH CARE EDUCATION/TRAINING PROGRAM

## 2023-02-05 PROCEDURE — 96372 THER/PROPH/DIAG INJ SC/IM: CPT

## 2023-02-05 PROCEDURE — 80053 COMPREHEN METABOLIC PANEL: CPT

## 2023-02-05 PROCEDURE — 6360000002 HC RX W HCPCS: Performed by: EMERGENCY MEDICINE

## 2023-02-05 RX ORDER — ONDANSETRON 4 MG/1
4 TABLET, ORALLY DISINTEGRATING ORAL EVERY 8 HOURS PRN
Status: DISCONTINUED | OUTPATIENT
Start: 2023-02-05 | End: 2023-02-07 | Stop reason: HOSPADM

## 2023-02-05 RX ORDER — CLOPIDOGREL BISULFATE 75 MG/1
75 TABLET ORAL ONCE
Status: COMPLETED | OUTPATIENT
Start: 2023-02-05 | End: 2023-02-05

## 2023-02-05 RX ORDER — ENTACAPONE 200 MG/1
200 TABLET ORAL 4 TIMES DAILY
Status: DISCONTINUED | OUTPATIENT
Start: 2023-02-05 | End: 2023-02-07 | Stop reason: HOSPADM

## 2023-02-05 RX ORDER — ATORVASTATIN CALCIUM 40 MG/1
40 TABLET, FILM COATED ORAL DAILY
Status: DISCONTINUED | OUTPATIENT
Start: 2023-02-05 | End: 2023-02-07 | Stop reason: HOSPADM

## 2023-02-05 RX ORDER — ENTACAPONE 200 MG/1
200 TABLET ORAL 4 TIMES DAILY
Status: DISCONTINUED | OUTPATIENT
Start: 2023-02-05 | End: 2023-02-05

## 2023-02-05 RX ORDER — MAGNESIUM HYDROXIDE/ALUMINUM HYDROXICE/SIMETHICONE 120; 1200; 1200 MG/30ML; MG/30ML; MG/30ML
30 SUSPENSION ORAL EVERY 6 HOURS PRN
Status: DISCONTINUED | OUTPATIENT
Start: 2023-02-05 | End: 2023-02-07 | Stop reason: HOSPADM

## 2023-02-05 RX ORDER — ALLOPURINOL 100 MG/1
100 TABLET ORAL 2 TIMES DAILY
Status: DISCONTINUED | OUTPATIENT
Start: 2023-02-05 | End: 2023-02-07 | Stop reason: HOSPADM

## 2023-02-05 RX ORDER — ROPINIROLE 0.25 MG/1
0.25 TABLET, FILM COATED ORAL 3 TIMES DAILY
Status: DISCONTINUED | OUTPATIENT
Start: 2023-02-05 | End: 2023-02-07 | Stop reason: HOSPADM

## 2023-02-05 RX ORDER — OXYCODONE HYDROCHLORIDE AND ACETAMINOPHEN 5; 325 MG/1; MG/1
1 TABLET ORAL ONCE
Status: COMPLETED | OUTPATIENT
Start: 2023-02-05 | End: 2023-02-05

## 2023-02-05 RX ORDER — BUTALBITAL, ACETAMINOPHEN AND CAFFEINE 50; 325; 40 MG/1; MG/1; MG/1
1 TABLET ORAL EVERY 4 HOURS PRN
Status: DISCONTINUED | OUTPATIENT
Start: 2023-02-05 | End: 2023-02-07 | Stop reason: HOSPADM

## 2023-02-05 RX ORDER — POLYETHYLENE GLYCOL 3350 17 G/17G
17 POWDER, FOR SOLUTION ORAL DAILY PRN
Status: DISCONTINUED | OUTPATIENT
Start: 2023-02-05 | End: 2023-02-07 | Stop reason: HOSPADM

## 2023-02-05 RX ORDER — AMITRIPTYLINE HYDROCHLORIDE 10 MG/1
10 TABLET, FILM COATED ORAL NIGHTLY
Status: DISCONTINUED | OUTPATIENT
Start: 2023-02-05 | End: 2023-02-07 | Stop reason: HOSPADM

## 2023-02-05 RX ORDER — ACETAMINOPHEN 325 MG/1
650 TABLET ORAL EVERY 6 HOURS PRN
Status: DISCONTINUED | OUTPATIENT
Start: 2023-02-05 | End: 2023-02-07 | Stop reason: HOSPADM

## 2023-02-05 RX ORDER — SUCRALFATE 1 G/1
1 TABLET ORAL ONCE
Status: COMPLETED | OUTPATIENT
Start: 2023-02-05 | End: 2023-02-05

## 2023-02-05 RX ORDER — ENOXAPARIN SODIUM 100 MG/ML
40 INJECTION SUBCUTANEOUS DAILY
Status: DISCONTINUED | OUTPATIENT
Start: 2023-02-05 | End: 2023-02-07 | Stop reason: HOSPADM

## 2023-02-05 RX ORDER — SODIUM CHLORIDE 0.9 % (FLUSH) 0.9 %
5-40 SYRINGE (ML) INJECTION PRN
Status: DISCONTINUED | OUTPATIENT
Start: 2023-02-05 | End: 2023-02-07 | Stop reason: HOSPADM

## 2023-02-05 RX ORDER — PANTOPRAZOLE SODIUM 40 MG/1
40 TABLET, DELAYED RELEASE ORAL
Status: DISCONTINUED | OUTPATIENT
Start: 2023-02-05 | End: 2023-02-07 | Stop reason: HOSPADM

## 2023-02-05 RX ORDER — 0.9 % SODIUM CHLORIDE 0.9 %
500 INTRAVENOUS SOLUTION INTRAVENOUS ONCE
Status: COMPLETED | OUTPATIENT
Start: 2023-02-05 | End: 2023-02-05

## 2023-02-05 RX ORDER — MAGNESIUM HYDROXIDE/ALUMINUM HYDROXICE/SIMETHICONE 120; 1200; 1200 MG/30ML; MG/30ML; MG/30ML
30 SUSPENSION ORAL ONCE
Status: COMPLETED | OUTPATIENT
Start: 2023-02-05 | End: 2023-02-05

## 2023-02-05 RX ORDER — GABAPENTIN 100 MG/1
100 CAPSULE ORAL 3 TIMES DAILY
Status: DISCONTINUED | OUTPATIENT
Start: 2023-02-05 | End: 2023-02-07 | Stop reason: HOSPADM

## 2023-02-05 RX ORDER — ISOSORBIDE MONONITRATE 30 MG/1
30 TABLET, EXTENDED RELEASE ORAL DAILY
Status: DISCONTINUED | OUTPATIENT
Start: 2023-02-05 | End: 2023-02-07 | Stop reason: HOSPADM

## 2023-02-05 RX ORDER — SUCRALFATE 1 G/1
1 TABLET ORAL 4 TIMES DAILY
Status: DISCONTINUED | OUTPATIENT
Start: 2023-02-05 | End: 2023-02-07 | Stop reason: HOSPADM

## 2023-02-05 RX ORDER — SODIUM CHLORIDE 0.9 % (FLUSH) 0.9 %
5-40 SYRINGE (ML) INJECTION EVERY 12 HOURS SCHEDULED
Status: DISCONTINUED | OUTPATIENT
Start: 2023-02-05 | End: 2023-02-07 | Stop reason: HOSPADM

## 2023-02-05 RX ORDER — ACETAMINOPHEN 650 MG/1
650 SUPPOSITORY RECTAL EVERY 6 HOURS PRN
Status: DISCONTINUED | OUTPATIENT
Start: 2023-02-05 | End: 2023-02-07 | Stop reason: HOSPADM

## 2023-02-05 RX ORDER — SODIUM CHLORIDE 9 MG/ML
INJECTION, SOLUTION INTRAVENOUS PRN
Status: DISCONTINUED | OUTPATIENT
Start: 2023-02-05 | End: 2023-02-07 | Stop reason: HOSPADM

## 2023-02-05 RX ORDER — ONDANSETRON 2 MG/ML
4 INJECTION INTRAMUSCULAR; INTRAVENOUS EVERY 6 HOURS PRN
Status: DISCONTINUED | OUTPATIENT
Start: 2023-02-05 | End: 2023-02-07 | Stop reason: HOSPADM

## 2023-02-05 RX ORDER — TAMSULOSIN HYDROCHLORIDE 0.4 MG/1
0.4 CAPSULE ORAL DAILY
Status: DISCONTINUED | OUTPATIENT
Start: 2023-02-05 | End: 2023-02-07 | Stop reason: HOSPADM

## 2023-02-05 RX ADMIN — SODIUM CHLORIDE, PRESERVATIVE FREE 10 ML: 5 INJECTION INTRAVENOUS at 20:28

## 2023-02-05 RX ADMIN — ALUMINUM HYDROXIDE, MAGNESIUM HYDROXIDE, AND SIMETHICONE 30 ML: 200; 200; 20 SUSPENSION ORAL at 08:51

## 2023-02-05 RX ADMIN — SODIUM CHLORIDE 40 MG: 9 INJECTION, SOLUTION INTRAMUSCULAR; INTRAVENOUS; SUBCUTANEOUS at 08:51

## 2023-02-05 RX ADMIN — CLOPIDOGREL 75 MG: 75 TABLET, FILM COATED ORAL at 15:46

## 2023-02-05 RX ADMIN — ALLOPURINOL 100 MG: 100 TABLET ORAL at 15:45

## 2023-02-05 RX ADMIN — SODIUM CHLORIDE, PRESERVATIVE FREE 20 ML: 5 INJECTION INTRAVENOUS at 15:41

## 2023-02-05 RX ADMIN — TAMSULOSIN HYDROCHLORIDE 0.4 MG: 0.4 CAPSULE ORAL at 15:45

## 2023-02-05 RX ADMIN — ACETAMINOPHEN 650 MG: 325 TABLET ORAL at 20:35

## 2023-02-05 RX ADMIN — ALLOPURINOL 100 MG: 100 TABLET ORAL at 20:37

## 2023-02-05 RX ADMIN — METOPROLOL TARTRATE 25 MG: 25 TABLET ORAL at 15:46

## 2023-02-05 RX ADMIN — GABAPENTIN 100 MG: 100 CAPSULE ORAL at 20:37

## 2023-02-05 RX ADMIN — ALUMINUM HYDROXIDE, MAGNESIUM HYDROXIDE, AND SIMETHICONE 30 ML: 200; 200; 20 SUSPENSION ORAL at 20:39

## 2023-02-05 RX ADMIN — ENTACAPONE 200 MG: 200 TABLET, FILM COATED ORAL at 20:35

## 2023-02-05 RX ADMIN — ENTACAPONE 200 MG: 200 TABLET, FILM COATED ORAL at 15:46

## 2023-02-05 RX ADMIN — SUCRALFATE 1 G: 1 TABLET ORAL at 17:30

## 2023-02-05 RX ADMIN — GABAPENTIN 100 MG: 100 CAPSULE ORAL at 15:46

## 2023-02-05 RX ADMIN — ROPINIROLE HYDROCHLORIDE 0.25 MG: 0.25 TABLET, FILM COATED ORAL at 15:45

## 2023-02-05 RX ADMIN — SUCRALFATE 1 G: 1 TABLET ORAL at 10:57

## 2023-02-05 RX ADMIN — OXYCODONE HYDROCHLORIDE AND ACETAMINOPHEN 1 TABLET: 5; 325 TABLET ORAL at 10:57

## 2023-02-05 RX ADMIN — ONDANSETRON 4 MG: 2 INJECTION INTRAMUSCULAR; INTRAVENOUS at 15:40

## 2023-02-05 RX ADMIN — DESMOPRESSIN ACETATE 40 MG: 0.2 TABLET ORAL at 15:46

## 2023-02-05 RX ADMIN — PANTOPRAZOLE SODIUM 40 MG: 40 TABLET, DELAYED RELEASE ORAL at 20:37

## 2023-02-05 RX ADMIN — CARBIDOPA AND LEVODOPA 1 TABLET: 25; 100 TABLET ORAL at 15:46

## 2023-02-05 RX ADMIN — ROPINIROLE HYDROCHLORIDE 0.25 MG: 0.25 TABLET, FILM COATED ORAL at 20:37

## 2023-02-05 RX ADMIN — ISOSORBIDE MONONITRATE 30 MG: 30 TABLET, EXTENDED RELEASE ORAL at 15:46

## 2023-02-05 RX ADMIN — SODIUM CHLORIDE 500 ML: 0.9 INJECTION, SOLUTION INTRAVENOUS at 10:03

## 2023-02-05 RX ADMIN — AMITRIPTYLINE HYDROCHLORIDE 10 MG: 10 TABLET, FILM COATED ORAL at 20:37

## 2023-02-05 RX ADMIN — ENOXAPARIN SODIUM 40 MG: 100 INJECTION SUBCUTANEOUS at 15:51

## 2023-02-05 RX ADMIN — CARBIDOPA AND LEVODOPA 1 TABLET: 25; 100 TABLET ORAL at 20:37

## 2023-02-05 ASSESSMENT — PAIN SCALES - GENERAL
PAINLEVEL_OUTOF10: 0
PAINLEVEL_OUTOF10: 7
PAINLEVEL_OUTOF10: 8
PAINLEVEL_OUTOF10: 6
PAINLEVEL_OUTOF10: 6
PAINLEVEL_OUTOF10: 0
PAINLEVEL_OUTOF10: 0

## 2023-02-05 ASSESSMENT — PAIN DESCRIPTION - PAIN TYPE
TYPE: ACUTE PAIN
TYPE: ACUTE PAIN

## 2023-02-05 ASSESSMENT — PAIN SCALES - WONG BAKER
WONGBAKER_NUMERICALRESPONSE: 0
WONGBAKER_NUMERICALRESPONSE: 4
WONGBAKER_NUMERICALRESPONSE: 0
WONGBAKER_NUMERICALRESPONSE: 0
WONGBAKER_NUMERICALRESPONSE: 4

## 2023-02-05 ASSESSMENT — PAIN DESCRIPTION - ORIENTATION
ORIENTATION: LEFT;UPPER;LOWER
ORIENTATION: LEFT;LOWER;UPPER

## 2023-02-05 ASSESSMENT — PAIN DESCRIPTION - LOCATION
LOCATION: ABDOMEN

## 2023-02-05 ASSESSMENT — PAIN DESCRIPTION - ONSET
ONSET: ON-GOING
ONSET: ON-GOING

## 2023-02-05 ASSESSMENT — PAIN - FUNCTIONAL ASSESSMENT
PAIN_FUNCTIONAL_ASSESSMENT: PREVENTS OR INTERFERES SOME ACTIVE ACTIVITIES AND ADLS
PAIN_FUNCTIONAL_ASSESSMENT: ACTIVITIES ARE NOT PREVENTED
PAIN_FUNCTIONAL_ASSESSMENT: 0-10

## 2023-02-05 ASSESSMENT — ENCOUNTER SYMPTOMS
CHOKING: 0
SHORTNESS OF BREATH: 0
SORE THROAT: 0
WHEEZING: 0
NAUSEA: 1
ABDOMINAL PAIN: 1
CONSTIPATION: 0
ABDOMINAL PAIN: 1
COUGH: 0
BLOOD IN STOOL: 0
DIARRHEA: 0
VOMITING: 0
NAUSEA: 1
CHEST TIGHTNESS: 0

## 2023-02-05 ASSESSMENT — PAIN DESCRIPTION - FREQUENCY
FREQUENCY: CONTINUOUS
FREQUENCY: CONTINUOUS

## 2023-02-05 ASSESSMENT — PAIN DESCRIPTION - DESCRIPTORS
DESCRIPTORS: BURNING

## 2023-02-05 NOTE — ED NOTES
Pt reports burning feels the same, resident notified. Patient is aox4, no distress noted, non labored RR. Resident to re assess patient.       Lisa Andujar RN  02/05/23 8077

## 2023-02-05 NOTE — ED NOTES
The following labs were labeled with patient stickers & tubed to lab;    []Lavender   []On Ice  []Blue  []Green/ Yellow  []Green/ Black []On Ice  []Pink  []Red  []Yellow    [x]COVID-19 Swab [x]Rapid    []Urine Sample  []Pelvic Cultures    []Blood Cultures       Fili Preston LPN  30/99/70 8536

## 2023-02-05 NOTE — ED NOTES
Pt assisted to restroom, pt ambulated with cane and shuffling gait.       Rachel Cortez RN  02/05/23 1428

## 2023-02-05 NOTE — ED NOTES
Pt ambulatory to bathroom with steady gait. Updated on plan of care, no distress noted, aox4.       Camilo Preston, RN  02/05/23 0542

## 2023-02-05 NOTE — ED NOTES
Pt brought back to ed room 11. EKG not obtained in triage per policy.         Ivonne Hager RN  02/05/23 2293

## 2023-02-05 NOTE — ED NOTES
Pt to ed from home, was seen in ed yesterday and discharged, unable to get his scripts filled at pharmacy. Patient reports \"burning in abdomen\" and vomiting since last night. Pt denies chest pain, aox4.       Whit Garza RN  02/05/23 7572

## 2023-02-05 NOTE — ED PROVIDER NOTES
[facility]     Emergency Department     Faculty Attestation    I performed a history and physical examination of the patient and discussed management with the resident. I reviewed the residents note and agree with the documented findings including all diagnostic interpretations and plan of care. Any areas of disagreement are noted on the chart. I was personally present for the key portions of any procedures. I have documented in the chart those procedures where I was not present during the key portions. I have reviewed the emergency nurses triage note. I agree with the chief complaint, past medical history, past surgical history, allergies, medications, social and family history as documented unless otherwise noted below. Documentation of the HPI, Physical Exam and Medical Decision Making performed by scribpaige is based on my personal performance of the HPI, PE and MDM. For Physician Assistant/ Nurse Practitioner cases/documentation I have personally evaluated this patient and have completed at least one if not all key elements of the E/M (history, physical exam, and MDM). Additional findings are as noted. Primary Care Physician: Mel Tan, APRN - CNP    VITAL SIGNS:   height is 5' 7\" (1.702 m) and weight is 179 lb (81.2 kg). His oral temperature is 96.8 °F (36 °C). His blood pressure is 144/75 (abnormal) and his pulse is 67. His respiration is 17 and oxygen saturation is 99%. Medical Decision Making  Abdominal pain nausea vomiting. Seen yesterday for similar presentation. Known history of esophagitis/gastritis. Did have CT abdomen pelvis yesterday which did not show any acute findings. Was unable to fill his prescriptions given to him yesterday as he did not have an available pharmacy open. On examination he appears uncomfortable but not acutely distressed abdomen is soft no rebound no guarding no masses.   Impression is abdominal pain likely secondary to esophagitis gastritis history. Abdominal labs, cardiac work-up, symptomatic treatment, reassess    Amount and/or Complexity of Data Reviewed  External Data Reviewed: labs, radiology and notes. Labs: ordered. Radiology: ordered. ECG/medicine tests: ordered. Risk  OTC drugs. Prescription drug management. Parenteral controlled substances. Decision regarding hospitalization.         Flo Michaels MD, Neil Allen  Attending Emergency Physician        Loly Cheung MD  02/05/23 [id number]

## 2023-02-05 NOTE — H&P
[facility]  CDU / OBSERVATION ENCOUNTER  RESIDENT NOTE     Pt Name: Denis Schirmer  MRN: [id number]  [place] 1940  Date of evaluation: 2/5/23  Patient's PCP is :  JW Mcmahan CNP    CHIEF COMPLAINT       Chief Complaint   Patient presents with    Abdominal Pain    Nausea       This history was taken in part by review of the emergency medical record. When possible, information was gathered by discussion with ER attendings, residents, and nurses. HISTORY OF PRESENT ILLNESS    Denis Schirmer is a 79 y.o. male who presents as a bounce back for upper abdominal pain. The patient was seen yesterday for similar. Patient states that he has been experiencing a deep burning pain that is located in his epigastrium. He rates at a 7 out of 10 and denies any radiation. He denies any shortness of breath and is able to speak in full sentences. He recently had an EGD which showed esophagitis, consistent with his pain. He is also had a cholecystectomy earlier and CT scan performed yesterday showed a seroma in the gallbladder fossa. This is now the first episode that he has had and he states that he is really only been taking his Parkinson medications and is unsure of others. He states that his pills are packaged and he takes what ever is in that package. He has been admitted to the observation unit for work-up by gastroenterology and potential scope.   He also states that he is unable to take care of himself at home and he was recently discharged from [facility]      Location/Symptom: Epigastric/right upper quadrant pain  Timing/Onset: Multiple episodes, this 1 beginning over the last 2 days though the patient has difficulty stating exactly when episodes began  Provocation: Unknown  Quality: Burning  Radiation: None  Severity: 8 out of 10  Timing/Duration: 2 days ago, constant  Modifying Factors: No known factors    REVIEW OF SYSTEMS       Review of Systems   Constitutional:  Positive for activity change (unable to perform ADLS). Negative for chills and fever. HENT:  Negative for sore throat. Respiratory:  Negative for cough, choking, chest tightness, shortness of breath and wheezing. Cardiovascular:  Negative for chest pain and palpitations. Gastrointestinal:  Positive for abdominal pain and nausea. Negative for blood in stool, constipation, diarrhea and vomiting. Skin:  Negative for rash and wound. Allergic/Immunologic: Negative for environmental allergies and food allergies. Neurological:  Positive for tremors (Past medical history of Parkinson's). Negative for weakness. Psychiatric/Behavioral:  Negative for self-injury and suicidal ideas. (PQRS) Advance directives on face sheet per hospital policy. 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 I have reviewed the past medical history with the patient and it is pertinent to this complaint. SURGICAL HISTORY      has a past surgical history that includes Colonoscopy (11/02/2012); shoulder surgery (Right); pr colsc flx w/rmvl of tumor polyp lesion snare tq (N/A, 09/19/2017); Colonoscopy (09/19/2017); Cholecystectomy, laparoscopic (N/A, 10/29/2022); Esophagogastroduodenoscopy (01/09/2023); and Upper gastrointestinal endoscopy (N/A, 1/9/2023).   I have reviewed and agree with Surgical History entered and it is pertinent to this complaint. CURRENT MEDICATIONS     pantoprazole (PROTONIX) 40 mg in sodium chloride (PF) 0.9 % 10 mL injection, Daily        All medication charted and reviewed. ALLERGIES     is allergic to aspirin. FAMILY HISTORY     He indicated that the status of his mother is unknown. He indicated that the status of his father is unknown.     family history includes No Known Problems in his father and mother. The patient denies any pertinent family history. I have reviewed and agree with the family history entered. I have reviewed the Family History and it is not significant to the case    SOCIAL HISTORY      reports that he has quit smoking. He has never used smokeless tobacco. He reports that he does not drink alcohol and does not use drugs. I have reviewed and agree with all Social.  There are no concerns for substance abuse/use. PHYSICAL EXAM     INITIAL VITALS:  height is 5' 7\" (1.702 m) and weight is 179 lb (81.2 kg). His oral temperature is 96.8 °F (36 °C). His blood pressure is 144/75 (abnormal) and his pulse is 67. His respiration is 17 and oxygen saturation is 99%. Physical Exam  Constitutional:       Appearance: Normal appearance. HENT:      Head: Normocephalic and atraumatic. Right Ear: External ear normal.      Left Ear: External ear normal.      Nose: Nose normal.      Mouth/Throat:      Mouth: Mucous membranes are moist.      Pharynx: Oropharynx is clear. Eyes:      Extraocular Movements: Extraocular movements intact. Pupils: Pupils are equal, round, and reactive to light. Cardiovascular:      Rate and Rhythm: Normal rate. Rhythm irregular. Pulses: Normal pulses. Heart sounds: Normal heart sounds. Pulmonary:      Effort: Pulmonary effort is normal. No respiratory distress. Breath sounds: No stridor. No wheezing, rhonchi or rales. Chest:      Chest wall: No tenderness. Abdominal:      General: There is no distension. Palpations: There is no mass. Tenderness: There is abdominal tenderness (Right upper quadrant, abdomen is rigid. ). There is no right CVA tenderness, left CVA tenderness, guarding or rebound. Hernia: No hernia is present. Musculoskeletal:      Cervical back: Normal range of motion and neck supple. No rigidity or tenderness. Right lower leg: No edema. Left lower leg: No edema. Lymphadenopathy:      Cervical: No cervical adenopathy. Skin:     Capillary Refill: Capillary refill takes less than 2 seconds. Neurological:      General: No focal deficit present. Mental Status: He is alert and oriented to person, place, and time. Comments: 3 to 5 Hz tremor noted, increasing in severity when patient talks for very long or appears to be a little nervous. Psychiatric:         Mood and Affect: Mood normal.         Behavior: Behavior normal.           DIFFERENTIAL DIAGNOSIS/MDM:     DDx: Esophagitis, GERD, postoperative abdominal pain, gastritis, ACS    DIAGNOSTIC RESULTS     EKG: All EKG's are interpreted by the Observation Physician who either signs or Co-signs this chart in the absence of a cardiologist.    EKG Interpretation    Interpreted by observation physician        Rhythm: normal sinus   Rate: normal  Axis: normal  Ectopy: premature atrial contraction  Conduction: right bundle branch block (incomplete)  ST Segments: no acute change and nonspecific changes  T Waves: normal  Q Waves: none    Clinical Impression: no acute changes, there is likely also difficulty interpreting given the patient's tremor, I noted leads were placed on the arms, which will introduce noise to the signal.  Noise can be appreciated in leads I, aVL and III. Otherwise EKG shows right bundle branch block.     Cyndy Draper MD        RADIOLOGY:   I directly visualized the following  images and reviewed the radiologist interpretations:    XR CHEST (2 VW)    Result Date: 2/5/2023  EXAMINATION: TWO XRAY VIEWS OF THE CHEST 2/5/2023 9:31 am COMPARISON: 01/06/2023 HISTORY: ORDERING SYSTEM PROVIDED HISTORY: Chest Pain TECHNOLOGIST PROVIDED HISTORY: Chest Pain FINDINGS: The cardiomediastinal silhouette is normal. No focal consolidation. The pulmonary vascularity is normal.  There is no pleural effusion or pneumothorax. Osseous structures grossly intact. No acute process. LABS:  I have reviewed and interpreted all available lab results.   Labs Reviewed   COMPREHENSIVE METABOLIC PANEL - Abnormal; Notable for the following components:       Result Value    Glucose 127 (*)     Creatinine 1.30 (*)     Est, Glom Filt Rate 55 (*)     Sodium 133 (*)     Chloride 97 (*)     All other components within normal limits   TROPONIN - Abnormal; Notable for the following components:    Troponin, High Sensitivity 26 (*)     All other components within normal limits   TROPONIN - Abnormal; Notable for the following components:    Troponin, High Sensitivity 25 (*)     All other components within normal limits   CBC   LIPASE   MAGNESIUM       SCREENING TOOLS:    HEART Risk Score for Chest Pain Patients  History and Physical Exam Suspicion Level  (Nausea, Vomiting, Diaphoresis, Radiation, Exertion)  Slightly Suspicious (0 pts)  Moderately Suspicious (1 pt)  Highly Suspicious (2 pts)  EKG Interpretation  Normal (0 pts)  Non-Specific Repolarization Disturbance (1 pt)  Significant ST-Depression (2 pts)  Age of Patient (in years)  = 39 (0 pts)  46-64 (1 pt)  = 65 (2 pts)  Risk Factors  No Risk Factors (0 pts)  1-2 Risk Factors (1 pt)  = 3 Risk Factors (2 pts)  Risk Factors Include:  Hypercholesterolemia  Hypertension  Diabetes Mellitus  Cigarette smoking  Positive family history  Obesity  CAD  (SLE, CKDz, HIV, Cocaine abuse)  Troponin Levels  = Normal Limit (0 pts)  1-3 Times Normal Limit (1 pt)  > 3 Times Normal Limit (2 pts)  TOTAL:    Percent Risk for Major Adverse Cardiac Event (MACE)  0-3 pts indicates low risk for MACE   2.5% (DISCHARGE)   4-7 pts indicates moderate risk for MACE  20.3% (OBS)  8-10 pts indicates high risk for MACE  72.7% (EARLY INVASIVE TX)    CDU IMPRESSION / PLAN      Thony Green is a 79 y.o. male who presents with abdominal pain    Abdominal pain  Patient has received Percocet, Maalox, Carafate, and Protonix with minor effect  Will continue to control pain per unit protocol  GI consulted, will see the patient tomorrow, appreciate recommendations  Labs otherwise nonspecific, troponin appears to be at baseline between 26 and 25, GI and liver profile appear normal, no signs of infection on CBC    Continue home medications and pain control  Monitor vitals, labs, and imaging  DISPO: pending consults and clinical improvement    CONSULTS:    IP CONSULT TO GI    PROCEDURES:  Not indicated       PATIENT REFERRED TO:    No follow-up provider specified. --  Raymundo Wayne MD   Emergency Medicine Resident     This dictation was generated by voice recognition computer software. Although all attempts are made to edit the dictation for accuracy, there may be errors in the transcription that are not intended.

## 2023-02-05 NOTE — ED PROVIDER NOTES
[facility]  ED  Emergency Department Encounter  Emergency Medicine Resident     Pt Name:Mina Looney  MRN: [id number]  [place] 1940  Date of evaluation: 2/5/23  PCP:  Alexis Valdez APRN - [id number]       Chief Complaint   Patient presents with    Abdominal Pain    Nausea       HISTORY OF PRESENT ILLNESS  (Location/Symptom, Timing/Onset, Context/Setting, Quality, Duration, Modifying Factors, Severity.)      Zbigniew Son is a 79 y.o. male who presents with upper abdominal pain and chest pain. Patient was just seen here yesterday for similar presentation. He has a history of esophagitis. He recently had a cholecystectomy with the surgery team here in October. He had an EGD done performed earlier in January of this year. Which showed esophagitis. Patient had a CT scan yesterday that was negative for acute intra-abdominal pathology. It did show a similar sized seroma present in the gallbladder fossa consistent with postoperative changes. He was discharged home after his symptoms resolved. He was unable to fill his prescriptions yesterday. He states that his pain returned. He actually used his life alert alarm, they came and evaluate him at home there is nothing wrong with him. He then called EMS to bring him to the hospital.  He rates his pain is 8 out of 10. He denies any hematemesis or bloody bowel movements.     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 has a past surgical history that includes Colonoscopy (11/02/2012); shoulder surgery (Right); pr colsc flx w/rmvl of tumor polyp lesion snare tq (N/A, 09/19/2017); Colonoscopy (09/19/2017); Cholecystectomy, laparoscopic (N/A, 10/29/2022); Esophagogastroduodenoscopy (01/09/2023); and Upper gastrointestinal endoscopy (N/A, 1/9/2023). Social History     Socioeconomic History    Marital status: Single     Spouse name: Not on file    Number of children: Not on file    Years of education: Not on file    Highest education level: Not on file   Occupational History    Not on file   Tobacco Use    Smoking status: Former    Smokeless tobacco: Never   Vaping Use    Vaping Use: Never used   Substance and Sexual Activity    Alcohol use: No    Drug use: No    Sexual activity: Not Currently   Other Topics Concern    Not on file   Social History Narrative    Not on file     Social Determinants of Health     Financial Resource Strain: Low Risk     Difficulty of Paying Living Expenses: Not hard at all   Food Insecurity: No Food Insecurity    Worried About Running Out of Food in the Last Year: Never true    [address] in the Last Year: Never true   Transportation Needs: No Transportation Needs    Lack of Transportation (Medical): No    Lack of Transportation (Non-Medical): No   Physical Activity: Inactive    Days of Exercise per Week: 0 days    Minutes of Exercise per Session: 0 min   Stress: Stress Concern Present    Feeling of Stress : To some extent   Social Connections: Socially Isolated    Frequency of Communication with Friends and Family: Three times a week    Frequency of Social Gatherings with Friends and Family:  Three times a week    Attends Taoism Services: Never    Active Member of Clubs or Organizations: No    Attends Club or Organization Meetings: Never    Marital Status:    Intimate Partner Violence: Not At Risk    Fear of Current or Ex-Partner: No    Emotionally Abused: No    Physically Abused: No    Sexually Abused: No   Housing Stability: Low Risk     Unable to Pay for Housing in the Last Year: No    Number of Places Lived in the Last Year: 1    Unstable Housing in the Last Year: No       Family History   Problem Relation Age of Onset    No Known Problems Mother     No Known Problems Father        Allergies:  Aspirin    Home Medications:  Prior to Admission medications    Medication Sig Start Date End Date Taking?  Authorizing Provider   pantoprazole (PROTONIX) 20 MG tablet Take 2 tablets by mouth in the morning and at bedtime 2/4/23   Kevon Peters DO   ondansetron (ZOFRAN-ODT) 4 MG disintegrating tablet Take 1 tablet by mouth 3 times daily as needed for Nausea or Vomiting 2/4/23 2/9/23  Kevon Peters DO   sucralfate (CARAFATE) 1 GM tablet Take 1 tablet by mouth 4 times daily 2/4/23 3/6/23  Kevon Peters DO   sucralfate (CARAFATE) 1 GM tablet Take 1 tablet by mouth 4 times daily 1/12/23   Luis Lord MD   pantoprazole (PROTONIX) 40 MG tablet Take 1 tablet by mouth 2 times daily (before meals) 1/12/23 2/11/23  Luis Lord MD   ibuprofen (ADVIL;MOTRIN) 800 MG tablet Take 1 tablet by mouth 2 times daily as needed for Pain 11/1/22   Andrew Valencia MD   tamsulosin (FLOMAX) 0.4 MG capsule Take 1 capsule by mouth daily 10/31/22   Ofelia Duverney, DO   gabapentin (NEURONTIN) 100 MG capsule TAKE 1 CAPSULE BY MOUTH 3 TIMES DAILY FOR NERVES 9/22/22 10/22/22  JW Ferguson CNP   pantoprazole (PROTONIX) 40 MG tablet TAKE 1 TABLET BY MOUTH ONCE DAILY FOR GERD 8/23/22   JW Ferguson CNP   isosorbide mononitrate (IMDUR) 30 MG extended release tablet TAKE 1 TABLET BY MOUTH ONCE DAILY FOR HYPERTENSION 8/23/22   JW Ferguson CNP   metoprolol tartrate (LOPRESSOR) 25 MG tablet TAKE ONE HALF (1/2) ATBLETS = 12.5MG BY MOUTH TWICE A DAY FOR HYPERTENSION 8/23/22   Jessica Cleveland, APRN - CNP   carbidopa-levodopa (SINEMET)  MG per tablet TAKE ONE (1) TABLET BY MOUTH FOUR (4) TIMES DAILY 8/9/22   [facility], JW - CNP   rOPINIRole (REQUIP) 0.25 MG tablet TAKE ONE (1) TABLET BY MOUTH THREE (3) TIMES DAILY 8/8/22   [facility], JW - CNP   atorvastatin (LIPITOR) 40 MG tablet take 1 tablet by mouth once daily 8/4/22   [facility], JW - FELIPE   entacapone (COMTAN) 200 MG tablet TAKE ONE (1) TABLET BY MOUTH FOUR (4) TIMES DAILY WITH SINEMET 6/21/22   Sharon Alvarez MD   nitroGLYCERIN (NITROSTAT) 0.4 MG SL tablet up to max of 3 total doses. If no relief after 1 dose, call 911. 6/9/22   JW Sylvester NP   magnesium oxide (MAG-OX) 400 (240 Mg) MG tablet TAKE 1 TABLET BY MOUTH ONCE DAILY 5/23/22   [facility], JW - FELIPE   diclofenac sodium (VOLTAREN) 1 % GEL Apply 2 g topically 2 times daily as needed for Pain (joint pain) 5/9/22   JW Mullins NP   amitriptyline (ELAVIL) 10 MG tablet Take 1 tablet by mouth nightly 4/5/22   Christi Lopez MD   clopidogrel (PLAVIX) 75 MG tablet TAKE 1 TABLET BY MOUTH ONCE DAILY  11/5/21   JW Torres CNP   allopurinol (ZYLOPRIM) 100 MG tablet TAKE 1 TABLET BY MOUTH ONCE DAILY  11/5/21   JW Torres CNP   acetaminophen (TYLENOL 8 HOUR ARTHRITIS PAIN) 650 MG extended release tablet Take 1 tablet by mouth every 8 hours as needed for Pain 6/14/21   [facility], JW - CNP   butalbital-acetaminophen-caffeine (FIORICET, ESGIC) -40 MG per tablet Take 1 tab prn only for severe headache. Can repeat after 4 hrs if needed. Max 2 tabs/24 hrs.  Max 4 tabs/week 6/14/21   [facility] C[facility]           REVIEW OF SYSTEMS       Review of Systems  Abdomen pain, denies chest pain, denies shortness of breath, denies vomiting  PHYSICAL EXAM      INITIAL VITALS:   BP (!) 144/75   Pulse 67   Temp 96.8 °F (36 °C) (Oral)   Resp 17   Ht 5' 7\" (1.702 m)   Wt 179 lb (81.2 kg)   SpO2 99%   BMI 28.04 kg/m²     Physical Exam  Ill-appearing, tremors, no abdominal pain to palpation, no peritoneal signs, heart sounds normal, breath sounds clear    DDX/DIAGNOSTIC RESULTS / EMERGENCY DEPARTMENT COURSE / Rneé is a 79 y.o. male who presents with upper abdominal pain and chest pain. Patient was just seen here yesterday for similar presentation. He has a history of esophagitis. He recently had a cholecystectomy with the surgery team here in October. He had an EGD done performed earlier in January of this year. Which showed esophagitis. Patient had a CT scan yesterday that was negative for acute intra-abdominal pathology. It did show a similar sized seroma present in the gallbladder fossa consistent with postoperative changes. He was discharged home after his symptoms resolved. He was unable to fill his prescriptions yesterday. He states that his pain returned. He actually used his life alert alarm, they came and evaluate him at home there is nothing wrong with him. He then called EMS to bring him to the hospital.  He rates his pain is 8 out of 10. He denies any hematemesis or bloody bowel movements. On exam his belly soft and overall nonperitoneal.  He has some minimal tenderness to palpation in the epigastric region. Breath sounds are clear bilaterally. Heart sounds are normal.  He appears to be uncomfortable. I reviewed the CT abdomen from yesterday. I treated him for his esophagitis today. It did not resolve his symptoms. In addition we had a cardiac work-up today that was nondiagnostic. His symptoms have not resolved after for different therapies for his esophagitis. We will admit to the observation unit for GI to see       Amount and/or Complexity of Data Reviewed  Labs: ordered. Radiology: ordered. ECG/medicine tests: ordered. Risk  OTC drugs. Prescription drug management. Decision regarding hospitalization.           EMERGENCY DEPARTMENT COURSE:         PROCEDURES:    Procedures    CONSULTS:  IP CONSULT TO GI        FINAL IMPRESSION      1. Esophagitis          DISPOSITION / PLAN     DISPOSITION Admitted 02/05/2023 11:49:28 AM      PATIENT REFERRED TO:  No follow-up provider specified.     DISCHARGE MEDICATIONS:  New Prescriptions    No medications on file       Winifred Townsend MD  Emergency Medicine Resident    (Please note that portions of thisnote were completed with a voice recognition program.  Efforts were made to edit the dictations but occasionally words are mis-transcribed.)       Winifred Townsend MD  Resident  02/05/23 2068

## 2023-02-05 NOTE — PROGRESS NOTES
[address]  CDU / OBSERVATION ENCOUNTER  ATTENDING NOTE       I performed a history and physical examination of the patient and discussed management with the resident or midlevel provider. I reviewed the resident or midlevel provider's note and agree with the documented findings and plan of care. Any areas of disagreement are noted on the chart. I was personally present for the key portions of any procedures. I have documented in the chart those procedures where I was not present during the key portions. I have reviewed the nurses notes. I agree with the chief complaint, past medical history, past surgical history, allergies, medications, social and family history as documented unless otherwise noted below. The Family history, social history, and ROS are effectively unchanged since admission unless noted elsewhere in the chart. Patient with epigastric pain, episode of nausea and vomiting today. Does have a history of esophagitis. With recent EGD. Multiple admissions for similar symptoms. Is living at home after recent discharge from [facility]. He does have a previous cholecystectomy, in October 2022. Has had issues with nausea and vomiting and chest pain since that time. EGD was done on January 9, that did show severe grade D reflux esophagitis with esophageal ulcerations. Patient was just seen in the ER yesterday. Was discharged home with medications but did not pick them up, and return today due to worsening symptoms. On exam patient is awake alert he does have baseline tremors as he has a history of parkinsonism. Tenderness in the epigastric region but abdomen is overall soft without rebound or guarding. He did have a CT abdomen and pelvis that was done yesterday that did show residual stable hypodensity in the gallbladder fossa likely chronic postoperative fluid collection or residual ductal structure. But no new inflammatory changes were noted.   He does have a small hiatal hernia that is known. We will plan on IV medications given his nausea and vomiting and patient to be admitted to observation unit for further symptomatic control. GI also consulted.     Renae Cruz  Attending Emergency  Physician

## 2023-02-05 NOTE — CARE COORDINATION
Case Management Assessment  Initial Evaluation    Date/Time of Evaluation: 2/5/2023 3:52 PM  Assessment Completed by: Arturo Sung RN    If patient is discharged prior to next notation, then this note serves as note for discharge by case management. Patient Name: Alyx Cm                   YOB: 1940  Diagnosis: Esophagitis [K20.90]                   Date / Time: 2/5/2023  8:36 AM    Patient Admission Status: Observation   Readmission Risk (Low < 19, Mod (19-27), High > 27): Readmission Risk Score: 20.5    Current PCP: JW Menard - CNP  PCP verified by CM? Yes Brenda Peguero NP)      History Provided by: Patient  Patient Orientation: Alert and Oriented    Patient Cognition: Alert    Hospitalization in the last 30 days (Readmission):  No    If yes, Readmission Assessment in  Navigator will be completed. Advance Directives:      Code Status: Full Code   Patient's Primary Decision Maker is:      Primary Decision MakeAryanjenise Mullins [facility] - [phone]    Secondary Decision Maker: Lexus Espinosa  [phone]    Discharge Planning:    Patient lives with: Friends Type of Home: House  Primary Care Giver: Other (Comment) (self with help from neighbors)  Patient Support Systems include: Friends/Neighbors   Current Financial resources: Medicare  Current community resources: ECF/Home Care  Current services prior to admission: Durable Medical Equipment            Current DME: [employer], Shower Chair               ADLS  Prior functional level: Assistance with the following:, Cooking, Housework  Current functional level: Assistance with the following:, Cooking, Housework (same as above)    PT AM-PAC:   /24  OT AM-PAC:   /24    Family can provide assistance at DC: No  Would you like Case Management to discuss the discharge plan with any other family members/significant others, and if so, who?  No  Plans to Return to Present Housing: Yes  Other Identified Issues/Barriers to RETURNING to current housing: none  Potential Assistance needed at discharge: [facility] [place]             Patient expects to discharge to: [facility] for transportation at discharge: Friends    Financial    Payor: Veronica Gongora / Plan: Veronica June / Product Type: *No Product type* /     Does insurance require precert for SNF: Yes    Potential assistance Purchasing Medications: No  Meds-to-Beds request:  yes      [address], [place] - [address] [address]  Phone: [phone] Fax: [address] [facility], [address] [facility] Shameka Buivard [address]  [address] [facility] [address]  Phone: [phone] Fax: [phone]    [address], [facility] [address]. - P [phone] - F [phone]  [address] [facility]. [place]el Mccall [address]  Phone: [phone] Fax: [phone]      Notes:    Factors facilitating achievement of predicted outcomes: Friend support    Barriers to discharge: Decreased endurance    Additional Case Management Notes: home with home care, call ze katz Monday to find out provider    The Plan for Transition of Care is related to the following treatment goals of Esophagitis [N64.24]    IF APPLICABLE: The Patient and/or patient representative Sofiya Magdaleno and his family were provided with a choice of provider and agrees with the discharge plan.  Freedom of choice list with basic dialogue that supports the patient's individualized plan of care/goals and shares the quality data associated with the providers was provided to:     Patient     The Patient and/or Patient Representative Agree with the Discharge Plan?  yes    Jean Goodwin RN  Case Management Department

## 2023-02-06 LAB
EKG ATRIAL RATE: 68 BPM
EKG P AXIS: 80 DEGREES
EKG P-R INTERVAL: 180 MS
EKG Q-T INTERVAL: 434 MS
EKG QRS DURATION: 104 MS
EKG QTC CALCULATION (BAZETT): 461 MS
EKG R AXIS: 32 DEGREES
EKG T AXIS: -7 DEGREES
EKG VENTRICULAR RATE: 68 BPM

## 2023-02-06 PROCEDURE — 6360000002 HC RX W HCPCS: Performed by: EMERGENCY MEDICINE

## 2023-02-06 PROCEDURE — 6370000000 HC RX 637 (ALT 250 FOR IP): Performed by: STUDENT IN AN ORGANIZED HEALTH CARE EDUCATION/TRAINING PROGRAM

## 2023-02-06 PROCEDURE — 97162 PT EVAL MOD COMPLEX 30 MIN: CPT

## 2023-02-06 PROCEDURE — G0378 HOSPITAL OBSERVATION PER HR: HCPCS

## 2023-02-06 PROCEDURE — 2580000003 HC RX 258: Performed by: EMERGENCY MEDICINE

## 2023-02-06 PROCEDURE — 97166 OT EVAL MOD COMPLEX 45 MIN: CPT

## 2023-02-06 PROCEDURE — 97530 THERAPEUTIC ACTIVITIES: CPT

## 2023-02-06 PROCEDURE — 6370000000 HC RX 637 (ALT 250 FOR IP): Performed by: EMERGENCY MEDICINE

## 2023-02-06 PROCEDURE — 96376 TX/PRO/DX INJ SAME DRUG ADON: CPT

## 2023-02-06 PROCEDURE — 96372 THER/PROPH/DIAG INJ SC/IM: CPT

## 2023-02-06 PROCEDURE — 97535 SELF CARE MNGMENT TRAINING: CPT

## 2023-02-06 RX ADMIN — ROPINIROLE HYDROCHLORIDE 0.25 MG: 0.25 TABLET, FILM COATED ORAL at 10:37

## 2023-02-06 RX ADMIN — ENTACAPONE 200 MG: 200 TABLET, FILM COATED ORAL at 15:17

## 2023-02-06 RX ADMIN — SUCRALFATE 1 G: 1 TABLET ORAL at 17:45

## 2023-02-06 RX ADMIN — PANTOPRAZOLE SODIUM 40 MG: 40 TABLET, DELAYED RELEASE ORAL at 16:52

## 2023-02-06 RX ADMIN — CARBIDOPA AND LEVODOPA 1 TABLET: 25; 100 TABLET ORAL at 10:37

## 2023-02-06 RX ADMIN — BUTALBITAL, ACETAMINOPHEN, AND CAFFEINE 1 TABLET: 50; 325; 40 TABLET ORAL at 09:25

## 2023-02-06 RX ADMIN — ALUMINUM HYDROXIDE, MAGNESIUM HYDROXIDE, AND SIMETHICONE 30 ML: 200; 200; 20 SUSPENSION ORAL at 06:27

## 2023-02-06 RX ADMIN — DESMOPRESSIN ACETATE 40 MG: 0.2 TABLET ORAL at 10:38

## 2023-02-06 RX ADMIN — SUCRALFATE 1 G: 1 TABLET ORAL at 10:38

## 2023-02-06 RX ADMIN — GABAPENTIN 100 MG: 100 CAPSULE ORAL at 21:02

## 2023-02-06 RX ADMIN — SUCRALFATE 1 G: 1 TABLET ORAL at 21:02

## 2023-02-06 RX ADMIN — TAMSULOSIN HYDROCHLORIDE 0.4 MG: 0.4 CAPSULE ORAL at 10:37

## 2023-02-06 RX ADMIN — ROPINIROLE HYDROCHLORIDE 0.25 MG: 0.25 TABLET, FILM COATED ORAL at 21:02

## 2023-02-06 RX ADMIN — SODIUM CHLORIDE, PRESERVATIVE FREE 20 ML: 5 INJECTION INTRAVENOUS at 15:18

## 2023-02-06 RX ADMIN — CARBIDOPA AND LEVODOPA 1 TABLET: 25; 100 TABLET ORAL at 15:17

## 2023-02-06 RX ADMIN — ALLOPURINOL 100 MG: 100 TABLET ORAL at 10:37

## 2023-02-06 RX ADMIN — ENTACAPONE 200 MG: 200 TABLET, FILM COATED ORAL at 10:37

## 2023-02-06 RX ADMIN — CARBIDOPA AND LEVODOPA 1 TABLET: 25; 100 TABLET ORAL at 17:45

## 2023-02-06 RX ADMIN — ONDANSETRON 4 MG: 2 INJECTION INTRAMUSCULAR; INTRAVENOUS at 06:28

## 2023-02-06 RX ADMIN — ONDANSETRON 4 MG: 2 INJECTION INTRAMUSCULAR; INTRAVENOUS at 15:18

## 2023-02-06 RX ADMIN — SODIUM CHLORIDE, PRESERVATIVE FREE 10 ML: 5 INJECTION INTRAVENOUS at 10:43

## 2023-02-06 RX ADMIN — ALLOPURINOL 100 MG: 100 TABLET ORAL at 21:01

## 2023-02-06 RX ADMIN — SODIUM CHLORIDE, PRESERVATIVE FREE 10 ML: 5 INJECTION INTRAVENOUS at 21:02

## 2023-02-06 RX ADMIN — ROPINIROLE HYDROCHLORIDE 0.25 MG: 0.25 TABLET, FILM COATED ORAL at 15:16

## 2023-02-06 RX ADMIN — ENTACAPONE 200 MG: 200 TABLET, FILM COATED ORAL at 21:02

## 2023-02-06 RX ADMIN — GABAPENTIN 100 MG: 100 CAPSULE ORAL at 15:16

## 2023-02-06 RX ADMIN — GABAPENTIN 100 MG: 100 CAPSULE ORAL at 10:37

## 2023-02-06 RX ADMIN — ENTACAPONE 200 MG: 200 TABLET, FILM COATED ORAL at 17:45

## 2023-02-06 RX ADMIN — PANTOPRAZOLE SODIUM 40 MG: 40 TABLET, DELAYED RELEASE ORAL at 06:22

## 2023-02-06 RX ADMIN — AMITRIPTYLINE HYDROCHLORIDE 10 MG: 10 TABLET, FILM COATED ORAL at 21:02

## 2023-02-06 RX ADMIN — ENOXAPARIN SODIUM 40 MG: 100 INJECTION SUBCUTANEOUS at 10:38

## 2023-02-06 RX ADMIN — BUTALBITAL, ACETAMINOPHEN, AND CAFFEINE 1 TABLET: 50; 325; 40 TABLET ORAL at 15:16

## 2023-02-06 RX ADMIN — CARBIDOPA AND LEVODOPA 1 TABLET: 25; 100 TABLET ORAL at 21:02

## 2023-02-06 RX ADMIN — SUCRALFATE 1 G: 1 TABLET ORAL at 15:16

## 2023-02-06 ASSESSMENT — PAIN SCALES - GENERAL
PAINLEVEL_OUTOF10: 0
PAINLEVEL_OUTOF10: 7
PAINLEVEL_OUTOF10: 7
PAINLEVEL_OUTOF10: 0
PAINLEVEL_OUTOF10: 0
PAINLEVEL_OUTOF10: 5
PAINLEVEL_OUTOF10: 0
PAINLEVEL_OUTOF10: 0
PAINLEVEL_OUTOF10: 4
PAINLEVEL_OUTOF10: 0

## 2023-02-06 ASSESSMENT — PAIN DESCRIPTION - DESCRIPTORS
DESCRIPTORS: ACHING
DESCRIPTORS: BURNING
DESCRIPTORS: PATIENT UNABLE TO DESCRIBE

## 2023-02-06 ASSESSMENT — PAIN DESCRIPTION - LOCATION
LOCATION: HEAD
LOCATION: ABDOMEN
LOCATION: HEAD

## 2023-02-06 ASSESSMENT — PAIN DESCRIPTION - ORIENTATION
ORIENTATION: LOWER;UPPER;LEFT
ORIENTATION: ANTERIOR
ORIENTATION: ANTERIOR

## 2023-02-06 ASSESSMENT — PAIN SCALES - WONG BAKER
WONGBAKER_NUMERICALRESPONSE: 0

## 2023-02-06 ASSESSMENT — PAIN - FUNCTIONAL ASSESSMENT
PAIN_FUNCTIONAL_ASSESSMENT: PREVENTS OR INTERFERES SOME ACTIVE ACTIVITIES AND ADLS

## 2023-02-06 NOTE — CARE COORDINATION
Notified by nurse that patient may need rehab at discharge.   Spoke with patient, who relates he'd like referral to [facility], referral faxed    [phone] received call from [facility] at [facility], they cannot accept patient, he still owes them [phone] met with patient to discuss the above, he'd like referral to [facility], referral faxed    [id number] spoke with Roc Coburn at [facility], they can accept, will start precert

## 2023-02-06 NOTE — PROGRESS NOTES
Occupational Therapy  Facility/Department: [facility] 3C OBSERVATION  Occupational Therapy Initial Assessment    Name: Maria Luisa Gonzales  : 1940  MRN: [id number]  Date of Service: 2023    Discharge Recommendations:  Patient would benefit from continued therapy after discharge  OT Equipment Recommendations  Equipment Needed: Yes  Mobility Devices: ADL Assistive Devices  ADL Assistive Devices: Grab Bars - shower;Grab Bars - toilet; Toileting - Drop Arm Commode, Heavy Duty Drop Arm Commode;Reacher;Long-handled Shoe Horn;Long-handled Sponge;Sock-Aid Hard       Patient Diagnosis(es): The encounter diagnosis was Esophagitis. 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 Past Surgical History:  has a past surgical history that includes Colonoscopy (2012); shoulder surgery (Right); pr colsc flx w/rmvl of tumor polyp lesion snare tq (N/A, 2017); Colonoscopy (2017); Cholecystectomy, laparoscopic (N/A, 10/29/2022); Esophagogastroduodenoscopy (2023); and Upper gastrointestinal endoscopy (N/A, 2023). Chief Complaint   Patient presents with    Abdominal Pain    Nausea       Assessment   Performance deficits / Impairments: Decreased ADL status; Decreased functional mobility ; Decreased endurance;Decreased strength;Decreased ROM; Decreased safe awareness;Decreased cognition;Decreased balance;Decreased high-level IADLs;Decreased fine motor control  Assessment: Pt completed supine to sit transfer EOB with Mod A d/t decreased endurance, strength and increased pain. Demonstrated functional sit<>stand transfers with CGA-Min A and functional mobility with grossly Min A. Initially utilized [facility]; however, encouraged pt to utilize RW to improve stability. Pt washed face standing sinkside with SBA for cues to initiate task. OT facilitated toileting with Min A for transfer. Pt significantly limited by decreased safety awareness. Returned supine with Min A for LE progression. Overall, pt exhibits limitations in balance, endurance, strength and cognition. Additionally, limited by increased abdominal pain throughout session per pt report. Pt is expected to require skilled OT services during their acute hospitalization stay to address the above noted deficits through skilled occupational therapy intervention for promotion of increased independence throughout ADLs, IADLs and functional mobility tasks. Prognosis: Good  Decision Making: Medium Complexity  REQUIRES OT FOLLOW-UP: Yes  Activity Tolerance  Activity Tolerance: Patient limited by pain;Treatment limited secondary to decreased cognition        Plan   Occupational Therapy Plan  Times Per Week: 3-4x/wk  Current Treatment Recommendations: Balance training, Functional mobility training, Endurance training, Safety education & training, Patient/Caregiver education & training, Equipment evaluation, education, & procurement, Self-Care / ADL, Home management training, Cognitive/Perceptual training     Restrictions  Restrictions/Precautions  Restrictions/Precautions: Fall Risk  Required Braces or Orthoses?: No  Position Activity Restriction  Other position/activity restrictions: up w/assist; hx of Parkinson's    Subjective   General  Patient assessed for rehabilitation services?: Yes  Family / Caregiver Present: No  General Comment  Comments: RN ok'd for OT/PT eval this AM. Pt agreeable to session, pleasent/cooperative throughout.  Pt reports 7/10 pain in abdomen. Social/Functional History  Social/Functional History  Lives With: Alone  Type of Home: House  Home Layout: Two level, Able to Live on Main level with bedroom/bathroom  Home Access: Stairs to enter with rails  Entrance Stairs - Number of Steps: 4-5  Entrance Stairs - Rails: Both  Bathroom Shower/Tub: Walk-in shower  Bathroom Equipment: Tub transfer bench  Home Equipment: Clive Anger, rolling  ADL Assistance: Independent  Homemaking Assistance: Needs assistance (Pt reports neighbors assist with cleaning, cooking and laundry tasks)  Homemaking Responsibilities: Yes  Ambulation Assistance: Independent (Pt reports ambulating with SPC at baseline)  Transfer Assistance: Independent  Active : Yes  Mode of Transportation: Car  Occupation: Retired  Leisure & Hobbies: Watching TV  Additional Comments: Pt reports neighbors are able to provide prn assist upon discharge. Objective      Safety Devices  Type of Devices: Nurse notified;Gait belt;Patient at risk for falls; Left in bed;Call light within reach; Bed alarm in place  Restraints  Restraints Initially in Place: No    Balance  Sitting: With support (Sat EOB ~10 minutes total with CGA for dynamic sitting and SBA for static sitting.)  Standing: With support (Pt completed static standing/dynamic standing with CGA , cane/RW. Pt exhibited ability to take BUEs off RW for clothing management during toileting; however, decreased stability observed with close CGA. Pt presents lacking awareness for safety/deficits.)    Gait  Overall Level of Assistance: Minimum assistance;Assist X1;Adaptive equipment; Additional time (Pt completed functional mobility EOB to bathroom with SPC. Self initiated mobility sinkside to toilet with no AD, very unsteady with poor return to retrieving cane and safety awareness. Mobility back completed with RW.  Min A overall d/t dec)  Assistive Device: Cane, straight;Gait belt;Walker, rolling    Toilet Transfers  Toilet - Technique: Ambulating  Equipment Used: Standard toilet  Toilet Transfer: Minimal assistance  Toilet Transfers Comments: Completed transfer onto toilet to control descent with Min A. Completed transfer back to standing with CGA. Pt overall limited by decreased balance and cognition. Use of B grab bars. AROM: Generally decreased, functional (B shoulders 0-90. Significantly impaired bilateral internal rotation d/t pain. Bilateral elbow, wrists and hands WFL. )  PROM: Within functional limits  Strength: Generally decreased, functional (B shoulders 3-/5. B elbows 4-/5. B hands 4/5.)  Coordination: Generally decreased, functional (Exhibits tremors. Parkinson's at baseline.)  Tone: Normal  Sensation: Intact (Denies any numbness/tingling.)      ADL  Feeding: Modified independent ;Setup; Increased time to complete  Grooming: Increased time to complete;Setup;Stand by assistance  Grooming Skilled Clinical Factors: Pt washed face standing sinkside. Required cues and assist to place cloth under running water to cue pt to initiate task. Limited d/t cognition. Poor standing tolerance to assess further sinkside ADLs, pt requesting to use toilet. UE Bathing: Minimal assistance; Increased time to complete;Setup;Verbal cueing  LE Bathing: Minimal assistance;Setup; Increased time to complete;Verbal cueing  UE Dressing: Minimal assistance;Verbal cueing;Setup; Increased time to complete  LE Dressing: Minimal assistance;Verbal cueing;Setup; Increased time to complete  Toileting: Minimal assistance; Increased time to complete;Setup  Toileting Skilled Clinical Factors: Pt layne completed mobility sinkside to toilet without device despite cues to obtain device. Unsteady w/ Min A required to ensure safety. Min A for transfer onto toilet for safety/balance. Pt was able to complete clothing management to don/doff underwear standing; however, very unsteady with lack of awareness of enviorment, almost hitting head on wall anterior to pt.  CGA for sit to stand with use of B grab bars. Additional Comments: Pt overall limited by decreased endurance, balance, cognition and increased pain. Activity Tolerance  Activity Tolerance: Patient limited by fatigue;Treatment limited secondary to decreased cognition    Bed mobility  Supine to Sit: Moderate assistance (Assist for trunk progression and slight LE progression.)  Sit to Supine: Minimal assistance (Assist for BLE progression)  Scooting: Contact guard assistance  Bed Mobility Comments: Incresed time/effort, HOB elevated, Limited d/t pain    Transfers  Sit to stand: Contact guard assistance  Stand to sit: Minimal assistance  Transfer Comments: Completed sit<>stand on/off EOB with CGA and RW. Completed transfer onto toilet to control descent with Min A. Completed transfer back to standing with CGA. Pt overall limited by decreased balance and cognition. Vision  Vision: Impaired  Vision Exceptions: Wears glasses at all times  Hearing  Hearing: Within functional limits    Cognition  Overall Cognitive Status: Exceptions  Following Commands: Follows multistep commands with increased time; Follows one step commands with increased time  Attention Span: Difficulty attending to directions; Difficulty dividing attention; Attends with cues to redirect  Memory: Decreased recall of precautions;Decreased recall of recent events  Safety Judgement: Decreased awareness of need for assistance  Problem Solving: Assistance required to identify errors made;Assistance required to generate solutions;Assistance required to correct errors made  Insights: Decreased awareness of deficits  Initiation: Requires cues for some  Sequencing: Requires cues for some    Orientation  Overall Orientation Status: Within Functional Limits  Orientation Level: Oriented X4;Oriented to place;Oriented to time;Oriented to situation;Oriented to person        Education Provided Comments: Pt educated on OT role, OT POC, activity promotion, safety awareness, walker/SPC management, balance maintenance-fair/poor return     AM-PAC Score        AM-PAC Inpatient Daily Activity Raw Score: 19 (02/06/23 1545)  AM-PAC Inpatient ADL T-Scale Score : 40.22 (02/06/23 1545)  ADL Inpatient CMS 0-100% Score: 42.8 (02/06/23 1545)  ADL Inpatient CMS G-Code Modifier : CK (02/06/23 1545)        Goals  Short Term Goals  Time Frame for Short Term Goals: By discharge, pt will:  Short Term Goal 1: Demo functional sit<>stand transfers and functional mobility with SBA and LRD PRN  Short Term Goal 2: Demo bed mobility sit<>supine with CGA to promote increased engagement in EOB/OOB functional activities  Short Term Goal 3: Demo 8 minutes of dynamic standing balance with CGA to promote increased independence throughout ADLs/IADLs  Short Term Goal 4: Demo UB ADLs with Mod IND and setup  Short Term Goal 5: Demo LB ADLs with SBA, setup and use of DME PRN  Short Term Goal 6: Demo good safety awareness independently throughout all functional activities with <2 VCs each visit       Therapy Time   Individual Concurrent Group Co-treatment   Time In 1035         Time Out 1102         Minutes 27         Timed Code Treatment Minutes: [address], OTR/L

## 2023-02-06 NOTE — PROGRESS NOTES
[address] [facility]  CDU / OBSERVATION eNCOUnter  Attending NOte       I performed a history and physical examination of the patient and discussed management with the resident. This patient was placed in the observation unit for reevaluation for possible admission to the hospital. I reviewed the residents note and agree with the documented findings and plan of care. Any areas of disagreement are noted on the chart. I was personally present for the key portions of any procedures. I have documented in the chart those procedures where I was not present during the key portions. I have reviewed the nurses notes. I agree with the chief complaint, past medical history, past surgical history, allergies, medications, social and family history as documented unless otherwise noted below. The Family history, social history, and ROS are effectively unchanged since admission unless noted elsewhere in the chart. 79-year-old male with known history of esophagitis and esophageal ulceration presents with epigastric abdominal pain. It does not sound like he has been taking his medications for his esophagitis. He never picked up his PPI. GI has evaluated the patient, and are recommending Protonix and Carafate. We will advance his diet. Reassess this afternoon.     Amado Petersen MD  Attending Emergency  Physician

## 2023-02-06 NOTE — PLAN OF CARE
Problem: Chronic Conditions and Co-morbidities  Goal: Patient's chronic conditions and co-morbidity symptoms are monitored and maintained or improved  Outcome: Progressing  Flowsheets (Taken 2/5/2023 1401)  Care Plan - Patient's Chronic Conditions and Co-Morbidity Symptoms are Monitored and Maintained or Improved:   Monitor and assess patient's chronic conditions and comorbid symptoms for stability, deterioration, or improvement   Collaborate with multidisciplinary team to address chronic and comorbid conditions and prevent exacerbation or deterioration   Update acute care plan with appropriate goals if chronic or comorbid symptoms are exacerbated and prevent overall improvement and discharge     Problem: Pain  Goal: Verbalizes/displays adequate comfort level or baseline comfort level  Outcome: Progressing     Problem: Skin/Tissue Integrity  Goal: Absence of new skin breakdown  Description: 1. Monitor for areas of redness and/or skin breakdown  2. Assess vascular access sites hourly  3. Every 4-6 hours minimum:  Change oxygen saturation probe site  4. Every 4-6 hours:  If on nasal continuous positive airway pressure, respiratory therapy assess nares and determine need for appliance change or resting period.   Outcome: Progressing     Problem: Safety - Adult  Goal: Free from fall injury  Outcome: Progressing  Flowsheets (Taken 2/5/2023 1404)  Free From Fall Injury:   Instruct family/caregiver on patient safety   Based on caregiver fall risk screen, instruct family/caregiver to ask for assistance with transferring infant if caregiver noted to have fall risk factors

## 2023-02-06 NOTE — PROGRESS NOTES
Physical Therapy  Facility/Department: [address] OBSERVATION  Physical Therapy Initial Assessment    Name: Kristen Randle  : 1940  MRN: [id number]  Date of Service: 2023  Chief Complaint   Patient presents with    Abdominal Pain    Nausea       Discharge Recommendations:  Patient would benefit from continued therapy after discharge   PT Equipment Recommendations  Equipment Needed: No (pt reports owning a RW)      Patient Diagnosis(es): The encounter diagnosis was Esophagitis. 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 Past Surgical History:  has a past surgical history that includes Colonoscopy (2012); shoulder surgery (Right); pr colsc flx w/rmvl of tumor polyp lesion snare tq (N/A, 2017); Colonoscopy (2017); Cholecystectomy, laparoscopic (N/A, 10/29/2022); Esophagogastroduodenoscopy (2023); and Upper gastrointestinal endoscopy (N/A, 2023). Assessment   Body Structures, Functions, Activity Limitations Requiring Skilled Therapeutic Intervention: Decreased functional mobility ; Decreased strength; Increased pain;Decreased posture;Decreased cognition;Decreased ROM; Decreased endurance;Decreased sensation;Decreased balance  Assessment: Pt ambulated 10ft w/ SPC Gustavo, 10ft w/ RW CGA, pt demonstrating increased gait stability with use of RW.  Pt fatigues extremely quickly and demonstrate poor safety awareness throughout session and would be unsafe to return to prior living arrangements independently. Recommending continued skilled physical therapy to address these deficits to return pt to prior level of function. Therapy Prognosis: Fair  Decision Making: Medium Complexity  Requires PT Follow-Up: Yes  Activity Tolerance  Activity Tolerance: Patient limited by fatigue;Treatment limited secondary to decreased cognition     Plan   Physcial Therapy Plan  General Plan:  (5-6x/week)  Current Treatment Recommendations: Strengthening, ROM, Balance training, Endurance training, Safety education & training, Patient/Caregiver education & training, Therapeutic activities, Home exercise program, Equipment evaluation, education, & procurement, Gait training, Stair training, Transfer training  Safety Devices  Type of Devices: Nurse notified, Gait belt, Patient at risk for falls, Left in bed, Call light within reach, Bed alarm in place  Restraints  Restraints Initially in Place: No     Restrictions  Restrictions/Precautions  Required Braces or Orthoses?: No  Position Activity Restriction  Other position/activity restrictions: up w/assist; hx of Parkinson's     Subjective   General  Patient assessed for rehabilitation services?: Yes  Response To Previous Treatment: Not applicable  Family / Caregiver Present: No  Follows Commands: Within Functional Limits  General Comment  Comments: pt denying pain at this time. Subjective  Subjective: RN and pt in agreement for PT eval. Pt supine in bed upon PT arrival, pt with flat affect requiring min verbal cueing for participation.          Social/Functional History  Social/Functional History  Lives With: Alone  Type of Home: House  Home Layout: Two level, Able to Live on Main level with bedroom/bathroom  Home Access: Stairs to enter with rails  Entrance Stairs - Number of Steps: 4-5  Entrance Stairs - Rails: Both  Bathroom Shower/Tub: Walk-in shower  Bathroom Equipment: Tub transfer bench  Home Equipment: ariadne Saleh  ADL Assistance: Independent  Homemaking Assistance: Needs assistance (Pt reports neighbors assist with cleaning, cooking and laundry tasks)  Homemaking Responsibilities: Yes  Ambulation Assistance: Independent (Pt reports ambulating with SPC at baseline)  Transfer Assistance: Independent  Active : Yes  Mode of Transportation: Car  Occupation: Retired  Leisure & Hobbies: Watching TV  Additional Comments: Pt reports neighbors are able to provide prn assist upon discharge. Vision/Hearing  Vision  Vision: Impaired  Vision Exceptions: Wears glasses at all times  Hearing  Hearing: Within functional limits    Cognition   Orientation  Overall Orientation Status: Within Functional Limits  Cognition  Overall Cognitive Status: Exceptions  Following Commands: Follows multistep commands with increased time  Attention Span: Difficulty attending to directions; Difficulty dividing attention  Memory: Decreased recall of precautions;Decreased recall of recent events  Safety Judgement: Decreased awareness of need for assistance  Problem Solving: Assistance required to identify errors made;Assistance required to generate solutions;Assistance required to correct errors made  Insights: Not aware of deficits  Initiation: Requires cues for some  Sequencing: Requires cues for some     Objective   Gross Assessment  Sensation: Intact     Joint Mobility  ROM RLE: WFL  ROM LLE: WFL  ROM RUE: See OT assessment- PT/ OT coeval  ROM LUE: See OT assessment- PT/ OT coeval  Strength RLE  Strength RLE: Exception  R Hip Flexion: 3-/5  R Knee Flexion: 4/5  R Knee Extension: 4/5  R Ankle Dorsiflexion: 4-/5  R Ankle Plantar flexion: 4-/5  Strength LLE  Strength LLE: Exception  L Hip Flexion: 3-/5  L Knee Flexion: 4/5  L Knee Extension: 4/5  L Ankle Dorsiflexion: 4-/5  L Ankle Plantar Flexion: 4-/5  Strength RUE  Comment: See OT assessment- PT/ OT coeval  Strength LUE  Comment: See OT assessment- PT/ OT coeval    Bed mobility  Supine to Sit: Moderate assistance (Assist provided for trunk progression)  Sit to Supine: Minimal assistance (Assist provided for LE progression)  Bed Mobility Comments: Increased time required to complete  Transfers  Sit to Stand: Contact guard assistance  Stand to Sit: Contact guard assistance;Minimal Assistance (Shoshana provided for controlled descent to toliet)  Ambulation  Surface: Level tile  Device: Single point cane  Assistance: Minimal assistance  Quality of Gait: Forward flexed posture  Gait Deviations: Slow Ewa; Shuffles  Distance: 10ft  Comments: Pt with significantly poor safety awareness with use of SPC requiring frequent redirection for safety awareness. Pt demonstrates poor awareness of environment, requiring max tactile cueing for navigation of objects within restroom. More Ambulation?: Yes  Ambulation 2  Surface - 2: level tile  Device 2: Rolling Walker  Assistance 2: Contact guard assistance  Gait Deviations: Slow Ewa; Shuffles  Distance: 10ft  Stairs/Curb  Stairs?: No     Balance  Posture: Fair  Sitting - Static: Fair;+  Sitting - Dynamic: Fair;-  Standing - Static: Fair;-  Standing - Dynamic: Fair;-  Comments: standing balance assessed w/ RW; pt able to sit EOB SBA  AM-PAC Score  AM-PAC Inpatient Mobility Raw Score : 17 (02/06/23 1233)  AM-PAC Inpatient T-Scale Score : 42.13 (02/06/23 1233)  Mobility Inpatient CMS 0-100% Score: 50.57 (02/06/23 1233)  Mobility Inpatient CMS G-Code Modifier : CK (02/06/23 1233)    Goals  Short Term Goals  Time Frame for Short Term Goals: 14  Short Term Goal 1: Pt to perform bed mobility independently  Short Term Goal 2: Pt to demonstrate functional transfers independently  Short Term Goal 3: Pt to ambulate 200ft w/ least restrictive independently  Short Term Goal 4: Ascend/descend 5 stairs with bilateral rails with supervision  Patient Goals   Patient Goals :  To feel better       Education  Patient Education  Education Given To: Patient  Education Provided: Role of Therapy;Plan of Care;Fall Prevention Strategies  Education Method: Demonstration  Barriers to Learning: None  Education Outcome: Verbalized understanding;Demonstrated understanding      Therapy Time   Individual Concurrent Group Co-treatment   Time In 5420         Time Out 1102         Minutes 27         Timed Code Treatment Minutes: 9 Minutes       Aurea Cervantes, PT

## 2023-02-06 NOTE — PROGRESS NOTES
OBS/CDU   RESIDENT NOTE      Patients PCP is:  JW Joya CNP        SUBJECTIVE      Patient reports continued pain in his abdomen. He is also reporting a headache. He continues to describe his pain as burning. PHYSICAL EXAM      General: NAD, AO X 3  Heent: EMOI, PERRL  Neck: SUPPLE, NO JVD  Cardiovascular: RRR, S1S2  Pulmonary: CTAB, NO SOB  Abdomen: SOFT, ND, +BS, patient is tender to light touch, which was not noticed on prior physical exam.  Extremities: +2/4 PULSES DISTAL, NO SWELLING  Neuro / Psych: NO NUMBNESS OR TINGLING, MENTATION AT BASELINE    PERTINENT TEST /EXAMS      I have reviewed all available laboratory results.     MEDICATIONS CURRENT   pantoprazole (PROTONIX) 40 mg in sodium chloride (PF) 0.9 % 10 mL injection, Daily  allopurinol (ZYLOPRIM) tablet 100 mg, BID  amitriptyline (ELAVIL) tablet 10 mg, Nightly  atorvastatin (LIPITOR) tablet 40 mg, Daily  butalbital-acetaminophen-caffeine (FIORICET, ESGIC) per tablet 1 tablet, Q4H PRN  carbidopa-levodopa (SINEMET)  MG per tablet 1 tablet, 4x Daily  gabapentin (NEURONTIN) capsule 100 mg, TID  tamsulosin (FLOMAX) capsule 0.4 mg, Daily  sucralfate (CARAFATE) tablet 1 g, 4x Daily  rOPINIRole (REQUIP) tablet 0.25 mg, TID  pantoprazole (PROTONIX) tablet 40 mg, BID AC  metoprolol tartrate (LOPRESSOR) tablet 25 mg, BID  isosorbide mononitrate (IMDUR) extended release tablet 30 mg, Daily  sodium chloride flush 0.9 % injection 5-40 mL, 2 times per day  sodium chloride flush 0.9 % injection 5-40 mL, PRN  0.9 % sodium chloride infusion, PRN  enoxaparin (LOVENOX) injection 40 mg, Daily  ondansetron (ZOFRAN-ODT) disintegrating tablet 4 mg, Q8H PRN   Or  ondansetron (ZOFRAN) injection 4 mg, Q6H PRN  polyethylene glycol (GLYCOLAX) packet 17 g, Daily PRN  acetaminophen (TYLENOL) tablet 650 mg, Q6H PRN   Or  acetaminophen (TYLENOL) suppository 650 mg, Q6H PRN  entacapone (COMTAN) tablet 200 mg, 4x Daily  aluminum & magnesium hydroxide-simethicone (MAALOX) 158-008-84 MG/5ML suspension 30 mL, Q6H PRN        All medication charted and reviewed. CONSULTS      IP CONSULT TO GI    ASSESSMENT/PLAN       Magdalena Biswas is a 79 y.o. male who presents with burning abdominal pain. Abdominal pain  Will continue to control pain per unit protocol  Fioricet for headaches ordered  GI consulted, will see the patient tomorrow, appreciate recommendations  Labs otherwise nonspecific, troponin appears to be at baseline between 26 and 25, GI and liver profile appear normal, no signs of infection on CBC  Given patient's pain on light touch, I would also like to consider a differential diagnosis of varicella-zoster. However on examination I am not seeing any lesions or rash. We will continue to monitor for it  Continue home medications and pain control  Monitor vitals, labs, and imaging  DISPO: pending consults and clinical improvement    --  Zaki Cheema MD  Emergency Medicine Resident Physician     This dictation was generated by voice recognition computer software. Although all attempts are made to edit the dictation for accuracy, there may be errors in the transcription that are not intended.

## 2023-02-06 NOTE — PLAN OF CARE
Problem: Chronic Conditions and Co-morbidities  Goal: Patient's chronic conditions and co-morbidity symptoms are monitored and maintained or improved  Outcome: Progressing     Problem: Pain  Goal: Verbalizes/displays adequate comfort level or baseline comfort level  Outcome: Progressing     Problem: Skin/Tissue Integrity  Goal: Absence of new skin breakdown  Description: 1. Monitor for areas of redness and/or skin breakdown  2. Assess vascular access sites hourly  3. Every 4-6 hours minimum:  Change oxygen saturation probe site  4. Every 4-6 hours:  If on nasal continuous positive airway pressure, respiratory therapy assess nares and determine need for appliance change or resting period.   Outcome: Progressing     Problem: Safety - Adult  Goal: Free from fall injury  Outcome: Progressing

## 2023-02-06 NOTE — CONSULTS
[address]    GASTROENTEROLOGY CONSULT    Patient:   Thony Green   :    1940   Facility:   [facility]   Date:    2023  Admission Dx:  Esophagitis [K20.90]  Requesting physician: Gonzales Moore MD  Reason for consult:  esophagitis    CC : \"abdominal pain\"    SUBJECTIVE     HISTORY OF PRESENT ILLNESS  This is a 79 y.o.  male pmh parkinsons',CKD, GERD, tubular adenoma of colon, diverticulosis, l/s CCY 10/29/2022 who was admitted 2023 with Esophagitis [K20.90]. We have been asked to see the patient in consultation by Gonzales Moore MD for  abdominal pain. Patient states that this abdominal pain has been going on for years intermittently. Abdominal pain is located in the epigastric region nonradiating burning sensation. Denies any n/v or bloody BM. Of note patient had a EGD on 2023 which showed grade D esophagitis with esophageal ulcerations. Patient states that since he's been discharged he has been unable to  the PPI medications for his esophagitis and has only been taking his parkinson's meds. No smoking, alcohol or illicit drugs. Does take NSAIDs occasionally for headaches. Summary of imaging completed at this time:  CTAP:  stable hypoensity in the gallbladder fossa, small hiatal hernia  Summary of labs completed at this time:  Lipase- 22  BUN: 14  Cr. 1.3  Hgb: 15.2    Previous GI history:   -EGD on 2023 which showed grade D esophagitis with esophageal ulcerations. - colonoscopy in 2017: multiple polyps and diverticulosis.  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 ([facility] [address] 3/18/2011    d/t being hurt on the job    CKD (chronic kidney disease) stage 2, GFR 60-89 ml/min     CKD (chronic kidney disease) stage 3, GFR 30-59 ml/min ([facility])     Diverticulosis of colon     GERD (gastroesophageal reflux disease)     History of non-ST elevation myocardial infarction (NSTEMI) 6/2022 10/27/2022    Impaired renal function     MRSA (methicillin resistant staph aureus) culture positive 9/23/2015    leg    Osteoarthritis of knee     Left    Parkinson disease ([facility] [address]     Proteinuria     Skull fracture ([facility] [address] 3/18/2011    d/t 12-foot drop on the job    Temporary loss of eyesight     periodically, happened x7    [facility]     Past Surgical History:   Procedure Laterality Date    CHOLECYSTECTOMY, LAPAROSCOPIC N/A 10/29/2022    LAPROSCOPIC CHOLECYSTECTOMY performed by Torie Jacobsen DO at [address]  11/02/2012    poor prep, tubular adenoma    COLONOSCOPY  09/19/2017    diverticulosis, multiple polyps as described, spot marking done, pathology-tubular adenoma x 4    ESOPHAGOGASTRODUODENOSCOPY  01/09/2023    IA COLSC FLX W/RMVL OF TUMOR POLYP LESION SNARE TQ N/A 09/19/2017    COLONOSCOPY POLYPECTOMY SNARE/COLD BIOPSY with tatooing and apc transverse colon performed by Kelly Mackey MD at [address] [facility] Right     rotator cuff    UPPER GASTROINTESTINAL ENDOSCOPY N/A 1/9/2023    EGD ESOPHAGOGASTRODUODENOSCOPY performed by Kimo Wolf MD at [address]:  Allergies   Allergen Reactions    Aspirin      Brain bleed         HOME MEDICATIONS:  Prior to Admission medications    Medication Sig Start Date End Date Taking?  Authorizing Provider   pantoprazole (PROTONIX) 20 MG tablet Take 2 tablets by mouth in the morning and at bedtime 2/4/23   Yolanda Bennett DO   ondansetron (ZOFRAN-ODT) 4 MG disintegrating tablet Take 1 tablet by mouth 3 times daily as needed for Nausea or Vomiting 2/4/23 2/9/23  Yolanda Bennett DO   sucralfate (CARAFATE) 1 GM tablet Take 1 tablet by mouth 4 times daily 2/4/23 3/6/23  Yolanda Bennett DO   sucralfate (CARAFATE) 1 GM tablet Take 1 tablet by mouth 4 times daily 1/12/23   Cherene Bumpers, MD   pantoprazole (PROTONIX) 40 MG tablet Take 1 tablet by mouth 2 times daily (before meals) 1/12/23 2/11/23  Beau Porras MD   ibuprofen (ADVIL;MOTRIN) 800 MG tablet Take 1 tablet by mouth 2 times daily as needed for Pain  Patient not taking: Reported on 2/5/2023 11/1/22   Frank Cox MD   tamsulosin [facility]) 0.4 MG capsule Take 1 capsule by mouth daily 10/31/22   Rodriguez Baker DO   gabapentin (NEURONTIN) 100 MG capsule TAKE 1 CAPSULE BY MOUTH 3 TIMES DAILY FOR NERVES 9/22/22 2/5/23  Marcelina Lake APRN - CNP   pantoprazole (PROTONIX) 40 MG tablet TAKE 1 TABLET BY MOUTH ONCE DAILY FOR GERD 8/23/22   JW Weston - FELIPE   isosorbide mononitrate (IMDUR) 30 MG extended release tablet TAKE 1 TABLET BY MOUTH ONCE DAILY FOR HYPERTENSION 8/23/22   Marcelina Lake APRN - CNP   metoprolol tartrate (LOPRESSOR) 25 MG tablet TAKE ONE HALF (1/2) ATBLETS = 12.5MG BY MOUTH TWICE A DAY FOR HYPERTENSION 8/23/22   Marcelina Lake APRN - CNP   carbidopa-levodopa (SINEMET)  MG per tablet TAKE ONE (1) TABLET BY MOUTH FOUR (4) TIMES DAILY 8/9/22   Marcelina Lake APRN - CNP   rOPINIRole (REQUIP) 0.25 MG tablet TAKE ONE (1) TABLET BY MOUTH THREE (3) TIMES DAILY 8/8/22   JW Weston - CNP   atorvastatin (LIPITOR) 40 MG tablet take 1 tablet by mouth once daily 8/4/22   Marcelina Lake APRN - CNP   entacapone (COMTAN) 200 MG tablet TAKE ONE (1) TABLET BY MOUTH FOUR (4) TIMES DAILY WITH SINEMET 6/21/22   Janice Paulino MD   nitroGLYCERIN (NITROSTAT) 0.4 MG SL tablet up to max of 3 total doses.  If no relief after 1 dose, call 911. 6/9/22   JW Melgar - NP   magnesium oxide (MAG-OX) 400 (240 Mg) MG tablet TAKE 1 TABLET BY MOUTH ONCE DAILY 5/23/22   Shirline Prima, APRN - CNP   diclofenac sodium (VOLTAREN) 1 % GEL Apply 2 g topically 2 times daily as needed for Pain (joint pain) 5/9/22   Farzana Miller APRN - NP   amitriptyline (ELAVIL) 10 MG tablet Take 1 tablet by mouth nightly 4/5/22   Arley Houser MD   clopidogrel (PLAVIX) 75 MG tablet TAKE 1 TABLET BY MOUTH ONCE DAILY  11/5/21   JW Henley CNP   allopurinol (ZYLOPRIM) 100 MG tablet TAKE 1 TABLET BY MOUTH ONCE DAILY  11/5/21   JW Henley CNP   acetaminophen (TYLENOL 8 HOUR ARTHRITIS PAIN) 650 MG extended release tablet Take 1 tablet by mouth every 8 hours as needed for Pain 6/14/21   JW Mcmahan CNP   butalbital-acetaminophen-caffeine (FIORICET, ESGIC) -40 MG per tablet Take 1 tab prn only for severe headache. Can repeat after 4 hrs if needed. Max 2 tabs/24 hrs. Max 4 tabs/week 6/14/21   JW Mcmahan CNP       CURRENT MEDICATIONS:  Scheduled Meds:   pantoprazole (PROTONIX) 40 mg injection  40 mg IntraVENous Daily    allopurinol  100 mg Oral BID    amitriptyline  10 mg Oral Nightly    atorvastatin  40 mg Oral Daily    carbidopa-levodopa  1 tablet Oral 4x Daily    gabapentin  100 mg Oral TID    tamsulosin  0.4 mg Oral Daily    sucralfate  1 g Oral 4x Daily    rOPINIRole  0.25 mg Oral TID    pantoprazole  40 mg Oral BID AC    metoprolol tartrate  25 mg Oral BID    isosorbide mononitrate  30 mg Oral Daily    sodium chloride flush  5-40 mL IntraVENous 2 times per day    enoxaparin  40 mg SubCUTAneous Daily    entacapone  200 mg Oral 4x Daily     Continuous Infusions:   sodium chloride       PRN Meds:butalbital-acetaminophen-caffeine, sodium chloride flush, sodium chloride, ondansetron **OR** ondansetron, polyethylene glycol, acetaminophen **OR** acetaminophen, aluminum & magnesium hydroxide-simethicone    SOCIAL HISTORY:     Tobacco:   reports that he has quit smoking. He has never used smokeless tobacco.  Alcohol:   reports no history of alcohol use. Illicit drugs:  reports no history of drug use. FAMILY HISTORY:     Family History   Problem Relation Age of Onset    No Known Problems Mother     No Known Problems Father        REVIEW OF SYSTEMS:    Constitutional: No fever, no chills, no lethargy, no weakness.   HEENT:  Issues swallowing  Cardiac: No chest pain, dyspnea, orthopnea or PND. Chest:   No cough, phlegm or wheezing. Abdomen:  Epigastric abdomninal pain   Neuro:  Tremors due to parkinsons  Skin:   No rashes, no itching. :   No hematuria, no pyuria, no dysuria, no flank pain. Extremities:  No swelling or joint pains. ROS was otherwise negative except as mentioned in the [address] PHYSICAL EXAM:    BP (!) 100/45   Pulse 53   Temp 97.5 °F (36.4 °C)   Resp 20   Ht 5' 7\" (1.702 m)   Wt 173 lb 6.4 oz (78.7 kg)   SpO2 95%   BMI 27.16 kg/m²     GENERAL: sick appearing  HEAD:   Normocephalic, Atraumatic  EENT:   EOMI, Sclera not icteric, Oropharynx moist   NECK:   Supple, Trachea midline  LUNGS:  CTA Bilaterally  HEART:  RRR, No murmur  ABDOMEN:   Soft, tender at epigastrum, Nondistended, BS WNL  EXT:   No clubbing. No cyanosis. No edema. SKIN:   No rashes. No jaundice. No stigmata of liver disease. MUSC/SKEL:   Adequate muscle bulk for patient's age, No significant synovitis, No deformities  NEURO:  tremors due to known parkinson's       LABS AND IMAGING:     CBC  Recent Labs     02/04/23  1400 02/05/23  0848   WBC 6.1 6.0   HGB 13.1 15.2   HCT 39.1* 45.8   MCV 96.3 96.6   MCH 32.3 32.1   MCHC 33.5 33.2    218       IMMATURE PLTs  Lab Results   Component Value Date/Time    PLTFLUORE 160 08/12/2021 03:58 AM       BMP  Recent Labs     02/04/23  1400 02/05/23  0848    133*   K 4.5 3.7    97*   CO2 23 24   BUN 17 14   CREATININE 1.45* 1.30*   GLUCOSE 144* 127*   CALCIUM 8.9 9.4       LFTS  Recent Labs     02/04/23  1400 02/05/23  0848   ALKPHOS 99 119   BILITOT 0.5 0.5   AST 21 27   ALT 16 26   PROT 6.6 8.0   LABALBU 3.6 4.5       AMYLASE/LIPASE/AMMONIA  Recent Labs     02/04/23  1400 02/05/23  0848   LIPASE 19 22       PT/INR  No results for input(s): PROTIME, INR in the last 72 hours.     ANEMIA STUDIES  No results for input(s): IRON, LABIRON, TIBC, UIBC, FERRITIN, USNAVVEP61, FOLATE, OCCULTBLD in the last 72 hours.      LIVER WORK UP:    Acute Hepatitis Panel   Lab Results   Component Value Date/Time    HEPBSAG NONREACTIVE 07/30/2012 12:10 PM    HEPCAB NONREACTIVE 07/30/2012 12:10 PM    HEPBIGM NONREACTIVE 07/30/2012 12:10 PM    HEPAIGM NONREACTIVE 07/30/2012 12:10 PM       HCV Genotype   No results found for: HEPATITISCGENOTYPE    HCV Quantitative   No results found for: HCVQNT    AFP  No results found for: AFP    Alpha 1 antitrypsin   No results found for: A1A    Anti - Liver/Kidney Ab  No results found for: LIVER-KIDNEYMICROSOMALAB    JONAH  Lab Results   Component Value Date/Time    JONAH NEGATIVE 06/28/2021 12:05 PM       AMA  No results found for: MITOAB    ASMA  No results found for: SMOOTHMUSCAB    Ceruloplasmin  No results found for: CERULOPLSM    Celiac panel  No results found for: TISSTRNTIIGG, TTGIGA, IGA    IgG  No results found for: IGG    IgM  No results found for: IGM    GGT   No results found for: LABGGT    PT/INR  No results for input(s): PROTIME, INR in the last 72 hours. Cancer Markers:  CEA:  No results found for: CEA  Ca 125:  No results found for:   Ca 19-9:   No results found for:   AFP: No results found for: AFP    Lactic acid:No results for input(s): LACTACIDWB in the last 72 hours. IMAGING  XR CHEST (2 VW)    Result Date: 2/5/2023  EXAMINATION: TWO XRAY VIEWS OF THE CHEST 2/5/2023 9:31 am COMPARISON: 01/06/2023 HISTORY: ORDERING SYSTEM PROVIDED HISTORY: Chest Pain TECHNOLOGIST PROVIDED HISTORY: Chest Pain FINDINGS: The cardiomediastinal silhouette is normal. No focal consolidation. The pulmonary vascularity is normal.  There is no pleural effusion or pneumothorax. Osseous structures grossly intact. No acute process.      CT HEAD WO CONTRAST    Result Date: 1/7/2023  EXAMINATION: CT OF THE HEAD WITHOUT CONTRAST  1/7/2023 1:46 pm TECHNIQUE: CT of the head was performed without the administration of intravenous contrast. Automated exposure control, iterative reconstruction, and/or weight based adjustment of the mA/kV was utilized to reduce the radiation dose to as low as reasonably achievable. COMPARISON: CT brain 08/13/2021 HISTORY: ORDERING SYSTEM PROVIDED HISTORY: Recurrent vomiting. R/o mass / increased ICP TECHNOLOGIST PROVIDED HISTORY: Recurrent vomiting. R/o mass / increased ICP Reason for Exam: Recurrent vomiting. R/o mass / increased ICP FINDINGS: BRAIN/VENTRICLES: There is no acute infarct or acute intracranial hemorrhage present. There is no mass effect or midline shift present. There is mild diffuse cerebral volume loss. There is mild periventricular hypoattenuation. There is no ventriculomegaly or abnormal extra-axial fluid collection present. ORBITS: Limited evaluation of the orbits is unremarkable. SINUSES: The paranasal sinuses and mastoid air cells are clear. SOFT TISSUES/SKULL:  No lytic or blastic osseous lesions are identified. 1. No acute intracranial process identified. 2. Mild diffuse cerebral volume loss and mild chronic small vessel ischemic changes. CT ABDOMEN PELVIS W IV CONTRAST Additional Contrast? None    Result Date: 2/4/2023  EXAMINATION: CT OF THE ABDOMEN AND PELVIS WITH CONTRAST 2/4/2023 3:25 pm TECHNIQUE: CT of the abdomen and pelvis was performed with the administration of intravenous contrast. Multiplanar reformatted images are provided for review. Automated exposure control, iterative reconstruction, and/or weight based adjustment of the mA/kV was utilized to reduce the radiation dose to as low as reasonably achievable. COMPARISON: 01/06/2023 HISTORY: ORDERING SYSTEM PROVIDED HISTORY: intractable RUQ/epigastric pain TECHNOLOGIST PROVIDED HISTORY: intractable RUQ/epigastric pain Decision Support Exception - unselect if not a suspected or confirmed emergency medical condition->Emergency Medical Condition (MA) Reason for Exam: intractable RUQ/epigastric pain FINDINGS: Lower Chest: Mild bibasilar atelectasis.   Pulmonary nodules in the right lung base are stable from prior study measuring up to approximately 10 mm in the right lower lobe. Organs: Liver: Normal appearance of the liver. Gallbladder: Postoperative clips in the right upper quadrant in the gallbladder fossa. There is hypoattenuation in the region of the gallbladder fossa which could be a postoperative fluid collection, less likely other lesion. This is stable and unchanged from prior study. No significant surrounding inflammatory changes. Residual ductal structure or gallbladder tissue is possible. No common bile duct dilatation seen. Spleen: Unremarkable spleen. Pancreas: No peripancreatic inflammatory changes. Adrenal Glands: No focal adrenal abnormalities identified. Kidneys: No hydronephrosis. Small bilateral renal cysts. Chronic perinephric inflammatory changes bilaterally. GI/Bowel: Stomach: Small hiatal hernia. Small bowel: No evidence of small bowel obstruction. Colon: No signficant pericolonic inflammatory changes. Appendix: Normal appearance of the appendix. Pelvis: No free fluid in the pelvis. Unremarkable pelvic organs. Peritoneum/Retroperitoneum: Atherosclerosis of the abdominal aorta. No retroperitoneal lymphadenopathy by CT criteria. Bones/Soft Tissues: No acute findings within the subcutaneous soft tissues. Spondylosis of the visualized spine. No acute intra-abdominal or pelvic abnormality. No significant change from prior study. Stable hypodensity in the gallbladder fossa, likely chronic postoperative fluid collection or residual ductal structure. No new inflammatory changes. Small hiatal hernia. IMPRESSION:     1. Esophagitis: seen on EGD last month. Did not take protonix after discharge. Patient also has chronic NSAID use  2. Parkinsons': on medication, apparnelt is on dysphagia diet  3. TAMARA: elevated cr seen on labs. most likely due to dehydration  4. Multiple polyps during 2017 was recommended to follow up in a year but did not follow up per chart. Also showed diverticulosis. Path is benign. Old records, labs and imaging reviewed. PLAN   1. Ok for dysphagia diet as tolerated   2. Restart his protonix and carafate  3. No need for endoscopic intervention inpatient. If patient still having pain then can give viscous lidocaine. 5. Recommend folow up with GI OP for colonoscopy due to multiple polyps seen in 2017. This plan was formulated in collaboration with Dr. Cherry Mcneil  Thank you for allowing us to participate in the care of your patient. Electronically signed by: Tato Chandra MD on 2/6/2023 at 9:49 AM     Please note that this note was generated using a voice recognition dictation software. Although every effort was made to ensure the accuracy of this automated transcription, some errors in transcription may have occurred.

## 2023-02-07 VITALS
OXYGEN SATURATION: 97 % | DIASTOLIC BLOOD PRESSURE: 55 MMHG | WEIGHT: 173.4 LBS | RESPIRATION RATE: 16 BRPM | HEART RATE: 51 BPM | SYSTOLIC BLOOD PRESSURE: 103 MMHG | TEMPERATURE: 97.7 F | HEIGHT: 67 IN | BODY MASS INDEX: 27.21 KG/M2

## 2023-02-07 PROCEDURE — G0378 HOSPITAL OBSERVATION PER HR: HCPCS

## 2023-02-07 PROCEDURE — 97535 SELF CARE MNGMENT TRAINING: CPT

## 2023-02-07 PROCEDURE — 1200000000 HC SEMI PRIVATE

## 2023-02-07 PROCEDURE — 96372 THER/PROPH/DIAG INJ SC/IM: CPT

## 2023-02-07 PROCEDURE — 6370000000 HC RX 637 (ALT 250 FOR IP): Performed by: EMERGENCY MEDICINE

## 2023-02-07 PROCEDURE — 6360000002 HC RX W HCPCS: Performed by: EMERGENCY MEDICINE

## 2023-02-07 PROCEDURE — 6370000000 HC RX 637 (ALT 250 FOR IP): Performed by: STUDENT IN AN ORGANIZED HEALTH CARE EDUCATION/TRAINING PROGRAM

## 2023-02-07 PROCEDURE — 2580000003 HC RX 258: Performed by: EMERGENCY MEDICINE

## 2023-02-07 RX ORDER — SUCRALFATE 1 G/1
1 TABLET ORAL 4 TIMES DAILY
Qty: 120 TABLET | Refills: 0 | Status: SHIPPED | OUTPATIENT
Start: 2023-02-07 | End: 2023-03-09

## 2023-02-07 RX ORDER — PANTOPRAZOLE SODIUM 40 MG/1
40 TABLET, DELAYED RELEASE ORAL
Qty: 60 TABLET | Refills: 0 | Status: SHIPPED | OUTPATIENT
Start: 2023-02-07 | End: 2023-03-09

## 2023-02-07 RX ADMIN — ACETAMINOPHEN 650 MG: 325 TABLET ORAL at 14:06

## 2023-02-07 RX ADMIN — GABAPENTIN 100 MG: 100 CAPSULE ORAL at 14:07

## 2023-02-07 RX ADMIN — ALUMINUM HYDROXIDE, MAGNESIUM HYDROXIDE, AND SIMETHICONE 30 ML: 200; 200; 20 SUSPENSION ORAL at 14:07

## 2023-02-07 RX ADMIN — ENTACAPONE 200 MG: 200 TABLET, FILM COATED ORAL at 07:51

## 2023-02-07 RX ADMIN — CARBIDOPA AND LEVODOPA 1 TABLET: 25; 100 TABLET ORAL at 14:07

## 2023-02-07 RX ADMIN — SUCRALFATE 1 G: 1 TABLET ORAL at 18:32

## 2023-02-07 RX ADMIN — PANTOPRAZOLE SODIUM 40 MG: 40 TABLET, DELAYED RELEASE ORAL at 16:43

## 2023-02-07 RX ADMIN — GABAPENTIN 100 MG: 100 CAPSULE ORAL at 07:51

## 2023-02-07 RX ADMIN — SODIUM CHLORIDE, PRESERVATIVE FREE 10 ML: 5 INJECTION INTRAVENOUS at 08:15

## 2023-02-07 RX ADMIN — SUCRALFATE 1 G: 1 TABLET ORAL at 14:07

## 2023-02-07 RX ADMIN — ENOXAPARIN SODIUM 40 MG: 100 INJECTION SUBCUTANEOUS at 07:52

## 2023-02-07 RX ADMIN — TAMSULOSIN HYDROCHLORIDE 0.4 MG: 0.4 CAPSULE ORAL at 07:51

## 2023-02-07 RX ADMIN — ROPINIROLE HYDROCHLORIDE 0.25 MG: 0.25 TABLET, FILM COATED ORAL at 07:52

## 2023-02-07 RX ADMIN — ENTACAPONE 200 MG: 200 TABLET, FILM COATED ORAL at 18:33

## 2023-02-07 RX ADMIN — PANTOPRAZOLE SODIUM 40 MG: 40 TABLET, DELAYED RELEASE ORAL at 07:51

## 2023-02-07 RX ADMIN — CARBIDOPA AND LEVODOPA 1 TABLET: 25; 100 TABLET ORAL at 07:52

## 2023-02-07 RX ADMIN — ROPINIROLE HYDROCHLORIDE 0.25 MG: 0.25 TABLET, FILM COATED ORAL at 14:07

## 2023-02-07 RX ADMIN — ENTACAPONE 200 MG: 200 TABLET, FILM COATED ORAL at 14:07

## 2023-02-07 RX ADMIN — ACETAMINOPHEN 650 MG: 325 TABLET ORAL at 07:49

## 2023-02-07 RX ADMIN — ALLOPURINOL 100 MG: 100 TABLET ORAL at 07:52

## 2023-02-07 RX ADMIN — DESMOPRESSIN ACETATE 40 MG: 0.2 TABLET ORAL at 07:51

## 2023-02-07 RX ADMIN — SUCRALFATE 1 G: 1 TABLET ORAL at 07:51

## 2023-02-07 RX ADMIN — ALUMINUM HYDROXIDE, MAGNESIUM HYDROXIDE, AND SIMETHICONE 30 ML: 200; 200; 20 SUSPENSION ORAL at 07:52

## 2023-02-07 RX ADMIN — CARBIDOPA AND LEVODOPA 1 TABLET: 25; 100 TABLET ORAL at 18:33

## 2023-02-07 ASSESSMENT — PAIN DESCRIPTION - LOCATION
LOCATION: ABDOMEN
LOCATION: HEAD

## 2023-02-07 ASSESSMENT — PAIN DESCRIPTION - DESCRIPTORS
DESCRIPTORS: BURNING
DESCRIPTORS: ACHING

## 2023-02-07 ASSESSMENT — PAIN DESCRIPTION - ORIENTATION
ORIENTATION: ANTERIOR
ORIENTATION: MID;UPPER

## 2023-02-07 ASSESSMENT — PAIN - FUNCTIONAL ASSESSMENT
PAIN_FUNCTIONAL_ASSESSMENT: PREVENTS OR INTERFERES SOME ACTIVE ACTIVITIES AND ADLS
PAIN_FUNCTIONAL_ASSESSMENT: PREVENTS OR INTERFERES SOME ACTIVE ACTIVITIES AND ADLS

## 2023-02-07 ASSESSMENT — PAIN SCALES - GENERAL
PAINLEVEL_OUTOF10: 5
PAINLEVEL_OUTOF10: 4

## 2023-02-07 NOTE — PROGRESS NOTES
Physician Progress Note      Radha Steinberg  CSN #:                  [id number]  :                       1940  ADMIT DATE:       2023 8:36 AM  DISCH DATE:  RESPONDING  PROVIDER #:        Jasmyne Mcfarland          QUERY TEXT:    Pt admitted with abdominal pain. Pt noted to have GI consult note stating   Esophagitis. If possible, please document in progress notes and discharge   summary if you are evaluating and /or treating any of the following: The medical record reflects the following:  Risk Factors: Esophagitis wit long term NSAID use not taking PPI therapy  Clinical Indicators: c/o burning, upper abd pain, chest pain. s/p   cholecystectomy 2022. Per GI consult note: Esophagitis: seen on EGD   last month. Did not take protonix after discharge. Patient also has chronic   NSAID use. No need for endoscopic intervention inpatient.  Progress note:   Patient states that the burning sensation in his epigastrium has significantly   come down. Treatment: GI consult, labs/monitoring. Restart his protonix and carafate. Bubba Kaplan RN, CDS  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     Query created by: Dustin Conteh on 2023 9:10 AM      Electronically signed by:  Jasmyne Mcfarland 2023 9:15 AM

## 2023-02-07 NOTE — PLAN OF CARE
Problem: Chronic Conditions and Co-morbidities  Goal: Patient's chronic conditions and co-morbidity symptoms are monitored and maintained or improved  2/7/2023 1138 by Mayte Maloney RN  Outcome: Progressing  2/7/2023 0550 by Sasha Escobar RN  Outcome: Progressing  2/7/2023 0510 by Sasha Escobar RN  Outcome: Progressing     Problem: Pain  Goal: Verbalizes/displays adequate comfort level or baseline comfort level  2/7/2023 1138 by Mayte Maloney RN  Outcome: Progressing  2/7/2023 0550 by Sasha Escobar RN  Outcome: Progressing  2/7/2023 0510 by Sasha Escobar RN  Outcome: Progressing     Problem: Skin/Tissue Integrity  Goal: Absence of new skin breakdown  Description: 1. Monitor for areas of redness and/or skin breakdown  2. Assess vascular access sites hourly  3. Every 4-6 hours minimum:  Change oxygen saturation probe site  4. Every 4-6 hours:  If on nasal continuous positive airway pressure, respiratory therapy assess nares and determine need for appliance change or resting period.   2/7/2023 1138 by Mayte Maloney RN  Outcome: Progressing  2/7/2023 0550 by Sasha Escobar RN  Outcome: Progressing  2/7/2023 0510 by Sasha Escobar RN  Outcome: Progressing     Problem: Safety - Adult  Goal: Free from fall injury  2/7/2023 1138 by Mayte Maloney RN  Outcome: Progressing  2/7/2023 0550 by Sasha Escobar RN  Outcome: Progressing  2/7/2023 0510 by Sasha Escobar RN  Outcome: Progressing

## 2023-02-07 NOTE — DISCHARGE INSTR - COC
Continuity of Care Form    Patient Name: Yonatan Nunez   :  1940  MRN:  [id number]    Admit date:  2023  Discharge date:  2023    Code Status Order: Full Code   Advance Directives:     Admitting Physician:  Amelia Caceres MD  PCP: Judie Cabot, APRN - CNP    Discharging Nurse: [facility] Unit/Room#: 2959/8645-23  Discharging Unit Phone Number: [phone]    Emergency Contact:   Extended Emergency Contact Information  Primary Emergency Contact: Jhony Gill  Address: n/a   [place] [address] Phone: [phone]  Relation: Child  Secondary Emergency Contact: Edna Pratt  Address: [place] Gaviria [address] Phone: [phone]  Mobile Phone: [phone]  Relation: Other    Past Surgical History:  Past Surgical History:   Procedure Laterality Date    CHOLECYSTECTOMY, LAPAROSCOPIC N/A 10/29/2022    LAPROSCOPIC CHOLECYSTECTOMY performed by Colette Dumont DO at [place] [address]  2012    poor prep, tubular adenoma    COLONOSCOPY  2017    diverticulosis, multiple polyps as described, spot marking done, pathology-tubular adenoma x 4    ESOPHAGOGASTRODUODENOSCOPY  2023    WI COLSC FLX W/RMVL OF TUMOR POLYP LESION SNARE TQ N/A 2017    COLONOSCOPY POLYPECTOMY SNARE/COLD BIOPSY with tatooing and apc transverse colon performed by Queenie Lundborg, MD at [address] [place] [address] Right     rotator cuff    UPPER GASTROINTESTINAL ENDOSCOPY N/A 2023    EGD ESOPHAGOGASTRODUODENOSCOPY performed by Johnie Meléndez MD at [address] [place] [address] History:   Immunization History   Administered Date(s) Administered    COVID-19, PFIZER Bivalent BOOSTER, DO NOT Dilute, (age 12y+), IM, 30 mcg/0.3 mL 2022    COVID-19, PFIZER GRAY top, DO NOT Dilute, (age 15 y+), IM, 30 mcg/0.3 mL 2022    COVID-19, PFIZER PURPLE top, DILUTE for use, (age 15 y+), 30mcg/0.3mL 2021, 2021, 2021    Influenza 2012    Influenza, FLUAD, (age 72 y+), Adjuvanted, 0.5mL 10/19/2020    Influenza, High Dose (Fluzone 65 yrs and older) 09/22/2014, 12/05/2016, 08/07/2017, 12/19/2017    Influenza, Triv, inactivated, subunit, adjuvanted, IM (Fluad 65 yrs and older) 08/23/2018, 10/06/2019    Pneumococcal Conjugate 13-valent ([place]) 06/14/2017, 05/18/2018, 05/18/2018    Pneumococcal Polysaccharide ([id number]) 09/25/2012, 12/04/2015       Active Problems:  Patient Active Problem List   Diagnosis Code    Temporary loss of eyesight H53.129    Syncope : posterior circulation stroke vs Neurocardiogenic syncope  R55    Intracranial bleed : traumatic left sub-dural hematoma ,managed conservatively in 2011 I62.9    Headache R51.9    CKD (chronic kidney disease) stage 3, GFR 30-59 ml/min ([facility]) N18.30    Depression F32. A    Hyperlipidemia E78.5    Microhematuria R31.29    Microalbuminuria R80.9    Essential hypertension I10    Generalized abdominal pain R10.84    Tubular adenoma of colon D12.6    Diverticulosis of colon K57.30    History of skull fracture Z87.81    Nausea R11.0    Pure hypercholesterolemia E78.00    Prediabetes R73.03    Parkinson disease ([address] G20    Chronic post-traumatic headache, not intractable G44.329    Fall (on) (from) other stairs and steps, initial encounter W10. 8XXA    Strain of iliopsoas muscle, initial encounter S76.919A    Chronic pain of left knee M25.562, G89.29    Closed fracture of second toe of right foot S92.501A    Closed fracture of second toe of left foot S92.502A    Abnormal ECG R94.31    Cerebrovascular accident [facility]) I63.9    Chest pain, unspecified R07.9    Hematoma of scalp S00. 03XA    Left ventricular hypertrophy I51.7    Mild aortic valve regurgitation I35.1    Pulmonary hypertension, mild ([facility]) I27.20    Lumbar radiculopathy M54.16    Dizziness R42    Hyponatremia E87.1    Vomiting R11.10    General weakness R53.1    Overweight (BMI 25.0-29. 9) E66.3    Frequent falls R29.6    Symptomatic bradycardia R00.1 Unspecified inflammatory spondylopathy, lumbar region [facility]) M46.96    Stage 3b chronic kidney disease (CKD) ([facility]) N18.32    Chronic pain of multiple joints M25.50, G89.29    Multiple comorbid conditions R69    Gout M10.9    Nerve pain M79.2    NSTEMI (non-ST elevated myocardial infarction) ([facility]) I21.4    History of subdural hematoma Z86.79    Coronary artery disease involving native heart with angina pectoris ([facility]) I25.119    Lumbar facet arthropathy M47.816    Spinal stenosis of lumbar region without neurogenic claudication M48.061    Generalized weakness R53.1    Lumbosacral spondylosis without myelopathy M47.817    Gallstone pancreatitis K85.10    Calculus of gallbladder with acute cholecystitis without obstruction K80.00    Bandemia D72.825    Pulmonary nodule R91.1    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    Acute cholecystitis K81.0    TAMARA (acute kidney injury) ([facility] N17.9    History of non-ST elevation myocardial infarction (NSTEMI) 6/2022 I25.2    Postoperative retention of urine N99.89, R33.8    History of coronary artery stent placement Z95.5    Nausea & vomiting R11.2    Intractable nausea and vomiting R11.2    Epigastric pain R10.13    Hyperemesis R11.10    Esophagitis K20.90       Isolation/Infection:   Isolation            Contact          Patient Infection Status       Infection Onset Added Last Indicated Last Indicated By Review Planned Expiration Resolved Resolved By    MRSA  09/25/15 09/25/15 Aria Wolff RN        Leg 9/23/15    Resolved    COVID-19 (Rule Out) 01/06/23 01/06/23 01/06/23 COVID-19, Rapid (Ordered)   01/06/23 Rule-Out Test Resulted    COVID-19 (Rule Out) 10/25/22 10/25/22 10/25/22 COVID-19, Rapid (Ordered)   10/25/22 Rule-Out Test Resulted    COVID-19 (Rule Out) 09/01/21 09/01/21 09/01/21 COVID-19, Rapid (Ordered)   09/01/21 Rule-Out Test Resulted            Nurse Assessment:  Last Vital Signs: BP (!) 103/55   Pulse 51   Temp 97.7 °F (36.5 °C) (Oral)   Resp 16   Ht 5' 7\" (1.702 m)   Wt 173 lb 6.4 oz (78.7 kg)   SpO2 97%   BMI 27.16 kg/m²     Last documented pain score (0-10 scale): Pain Level: 4  Last Weight:   Wt Readings from Last 1 Encounters:   02/05/23 173 lb 6.4 oz (78.7 kg)     Mental Status:  oriented, alert, coherent, logical, thought processes intact, and able to concentrate and follow conversation    IV Access:  - None    Nursing Mobility/ADLs:  Walking   Assisted  Transfer  Assisted  Bathing  Assisted  Dressing  Assisted  Toileting  Assisted  Feeding  Independent  Med Admin  Independent  Med Delivery   prefers mixed with whole tablets in apple sauce    Wound Care Documentation and Therapy:        Elimination:  Continence: Bowel: Yes  Bladder: Yes  Urinary Catheter: None   Colostomy/Ileostomy/Ileal Conduit: No       Date of Last BM: 2/6/2023  No intake or output data in the 24 hours ending 02/07/23 1452  No intake/output data recorded. Safety Concerns: At Risk for Falls and Aspiration Risk    Impairments/Disabilities:      Speech, Vision, and Ambulating d/t parkinsons    Nutrition Therapy:  Current Nutrition Therapy:   - Oral Diet:  Dysphagia 2 mechanically altered; minced and moist diet    Routes of Feeding: Oral  Liquids: Thin Liquids  Daily Fluid Restriction: no  Last Modified Barium Swallow with Video (Video Swallowing Test): not done    Treatments at the Time of Hospital Discharge:   Respiratory Treatments: n/a  Oxygen Therapy:  is not on home oxygen therapy.   Ventilator:    - No ventilator support    Rehab Therapies: Physical Therapy, Occupational Therapy, and Speech/Language Therapy  Weight Bearing Status/Restrictions: No weight bearing restrictions  Other Medical Equipment (for information only, NOT a DME order):  cane  Other Treatments: PT/OT    Patient's personal belongings (please select all that are sent with patient):  Glasses, Dentures upper    RN SIGNATURE:  Electronically signed by Ron Min RN on 2/7/23 at 6:59 PM EST    CASE MANAGEMENT/SOCIAL WORK SECTION    Inpatient Status Date: ***    Readmission Risk Assessment Score:  Readmission Risk              Risk of Unplanned Readmission:  21           Discharging to Facility/ Agency   Name: [facility]. and rehab   Address: [address]   Phone: [phone]  Fax:    Dialysis Facility (if applicable)   Name:  Address:  Dialysis Schedule:  Phone:  Fax:    / signature: Electronically signed by Elpidio Wolfe RN on 2/7/23 at 2:53 PM EST    PHYSICIAN SECTION    Prognosis: Fair    Condition at Discharge: Stable    Rehab Potential (if transferring to Rehab): Fair    Recommended Labs or Other Treatments After Discharge:      Physician Certification: I certify the above information and transfer of Musa Issa  is necessary for the continuing treatment of the diagnosis listed and that he requires [facility] for less 30 days.      Update Admission H&P: No change in H&P    PHYSICIAN SIGNATURE:  Electronically signed by Kwasi Casiano MD on 2/7/23 at 2:56 PM EST

## 2023-02-07 NOTE — DISCHARGE SUMMARY
CDU Discharge Summary        Patient:  Gregoria Hinds  YOB: 1940    MRN: [id number]   Acct: [de-identified]    Primary Care Physician: JW Leon CNP    Admit date:  2/5/2023  8:36 AM  Discharge date: 2/7/2023  8:45 PM     Discharge Diagnoses:     Acute abdominal pain due to hiatal hernia and medication non complianc3  Improved with restarting medications    Follow-up:  Call today/tomorrow for a follow up appointment with JW Leon CNP , or return to the Emergency Room with worsening symptoms    Stressed to patient the importance of following up with primary care doctor for further workup/management of symptoms. Pt verbalizes understanding and agrees with plan. Discharge Medications:  Changes to medications            Medication List        CHANGE how you take these medications      * pantoprazole 40 MG tablet  Commonly known as: PROTONIX  TAKE 1 TABLET BY MOUTH ONCE DAILY FOR GERD  What changed: Another medication with the same name was removed. Continue taking this medication, and follow the directions you see here. * pantoprazole 40 MG tablet  Commonly known as: PROTONIX  Take 1 tablet by mouth 2 times daily (before meals)  What changed: Another medication with the same name was removed. Continue taking this medication, and follow the directions you see here. sucralfate 1 GM tablet  Commonly known as: Carafate  Take 1 tablet by mouth 4 times daily  What changed: Another medication with the same name was removed. Continue taking this medication, and follow the directions you see here. * This list has 2 medication(s) that are the same as other medications prescribed for you. Read the directions carefully, and ask your doctor or other care provider to review them with you.                 CONTINUE taking these medications      acetaminophen 650 MG extended release tablet  Commonly known as: Tylenol 8 Hour Arthritis Pain  Take 1 tablet by mouth every 8 hours as needed for Pain     allopurinol 100 MG tablet  Commonly known as: ZYLOPRIM  TAKE 1 TABLET BY MOUTH ONCE DAILY     amitriptyline 10 MG tablet  Commonly known as: ELAVIL  Take 1 tablet by mouth nightly     atorvastatin 40 MG tablet  Commonly known as: LIPITOR  take 1 tablet by mouth once daily     butalbital-acetaminophen-caffeine -40 MG per tablet  Commonly known as: FIORICET, ESGIC  Take 1 tab prn only for severe headache. Can repeat after 4 hrs if needed. Max 2 tabs/24 hrs. Max 4 tabs/week     carbidopa-levodopa  MG per tablet  Commonly known as: SINEMET  TAKE ONE (1) TABLET BY MOUTH FOUR (4) TIMES DAILY     clopidogrel 75 MG tablet  Commonly known as: PLAVIX  TAKE 1 TABLET BY MOUTH ONCE DAILY     diclofenac sodium 1 % Gel  Commonly known as: VOLTAREN  Apply 2 g topically 2 times daily as needed for Pain (joint pain)     entacapone 200 MG tablet  Commonly known as: COMTAN  TAKE ONE (1) TABLET BY MOUTH FOUR (4) TIMES DAILY WITH SINEMET     gabapentin 100 MG capsule  Commonly known as: NEURONTIN  TAKE 1 CAPSULE BY MOUTH 3 TIMES DAILY FOR NERVES     isosorbide mononitrate 30 MG extended release tablet  Commonly known as: IMDUR  TAKE 1 TABLET BY MOUTH ONCE DAILY FOR HYPERTENSION     magnesium oxide 400 (240 Mg) MG tablet  Commonly known as: MAG-OX  TAKE 1 TABLET BY MOUTH ONCE DAILY     metoprolol tartrate 25 MG tablet  Commonly known as: LOPRESSOR  TAKE ONE HALF (1/2) ATBLETS = 12.5MG BY MOUTH TWICE A DAY FOR HYPERTENSION     nitroGLYCERIN 0.4 MG SL tablet  Commonly known as: NITROSTAT  up to max of 3 total doses. If no relief after 1 dose, call 911.      ondansetron 4 MG disintegrating tablet  Commonly known as: ZOFRAN-ODT  Take 1 tablet by mouth 3 times daily as needed for Nausea or Vomiting     rOPINIRole 0.25 MG tablet  Commonly known as: REQUIP  TAKE ONE (1) TABLET BY MOUTH THREE (3) TIMES DAILY     tamsulosin 0.4 MG capsule  Commonly known as: FLOMAX  Take 1 capsule by mouth daily            STOP taking these medications      ibuprofen 800 MG tablet  Commonly known as: ADVIL;MOTRIN               Where to Get Your Medications        You can get these medications from any pharmacy    Bring a paper prescription for each of these medications  pantoprazole 40 MG tablet  sucralfate 1 GM tablet         Diet:  No diet orders on file , Advance as tolerated     Activity:  As tolerated    Consultants: IP CONSULT TO GI    Procedures:  Not indicated     Diagnostic Test:   Results for orders placed or performed during the hospital encounter of 02/05/23   COVID-19, Rapid    Specimen: Nasopharyngeal Swab   Result Value Ref Range    Specimen Description . NASOPHARYNGEAL SWAB     SARS-CoV-2, Rapid Not Detected Not Detected   CBC   Result Value Ref Range    WBC 6.0 3.5 - 11.3 k/uL    RBC 4.74 4.21 - 5.77 m/uL    Hemoglobin 15.2 13.0 - 17.0 g/dL    Hematocrit 45.8 40.7 - 50.3 %    MCV 96.6 82.6 - 102.9 fL    MCH 32.1 25.2 - 33.5 pg    MCHC 33.2 28.4 - 34.8 g/dL    RDW 14.1 11.8 - 14.4 %    Platelets 607 472 - 961 k/uL    MPV 8.9 8.1 - 13.5 fL    NRBC Automated 0.0 0.0 per 100 WBC   Comprehensive Metabolic Panel   Result Value Ref Range    Glucose 127 (H) 70 - 99 mg/dL    BUN 14 8 - 23 mg/dL    Creatinine 1.30 (H) 0.70 - 1.20 mg/dL    Est, Glom Filt Rate 55 (L) >60 mL/min/1.73m2    Calcium 9.4 8.6 - 10.4 mg/dL    Sodium 133 (L) 135 - 144 mmol/L    Potassium 3.7 3.7 - 5.3 mmol/L    Chloride 97 (L) 98 - 107 mmol/L    CO2 24 20 - 31 mmol/L    Anion Gap 12 9 - 17 mmol/L    Alkaline Phosphatase 119 40 - 129 U/L    ALT 26 5 - 41 U/L    AST 27 <40 U/L    Total Bilirubin 0.5 0.3 - 1.2 mg/dL    Total Protein 8.0 6.4 - 8.3 g/dL    Albumin 4.5 3.5 - 5.2 g/dL    Albumin/Globulin Ratio 1.3 1.0 - 2.5   Troponin   Result Value Ref Range    Troponin, High Sensitivity 26 (H) 0 - 22 ng/L   Troponin   Result Value Ref Range    Troponin, High Sensitivity 25 (H) 0 - 22 ng/L   Lipase   Result Value Ref Range    Lipase 22 13 - 60 U/L   Magnesium Result Value Ref Range    Magnesium 2.3 1.6 - 2.6 mg/dL   EKG 12 Lead   Result Value Ref Range    Ventricular Rate 68 BPM    Atrial Rate 68 BPM    P-R Interval 180 ms    QRS Duration 104 ms    Q-T Interval 434 ms    QTc Calculation (Bazett) 461 ms    P Axis 80 degrees    R Axis 32 degrees    T Axis -7 degrees     XR CHEST (2 VW)    Result Date: 2/5/2023  EXAMINATION: TWO XRAY VIEWS OF THE CHEST 2/5/2023 9:31 am COMPARISON: 01/06/2023 HISTORY: ORDERING SYSTEM PROVIDED HISTORY: Chest Pain TECHNOLOGIST PROVIDED HISTORY: Chest Pain FINDINGS: The cardiomediastinal silhouette is normal. No focal consolidation. The pulmonary vascularity is normal.  There is no pleural effusion or pneumothorax. Osseous structures grossly intact. No acute process. CT ABDOMEN PELVIS W IV CONTRAST Additional Contrast? None    Result Date: 2/4/2023  EXAMINATION: CT OF THE ABDOMEN AND PELVIS WITH CONTRAST 2/4/2023 3:25 pm TECHNIQUE: CT of the abdomen and pelvis was performed with the administration of intravenous contrast. Multiplanar reformatted images are provided for review. Automated exposure control, iterative reconstruction, and/or weight based adjustment of the mA/kV was utilized to reduce the radiation dose to as low as reasonably achievable. COMPARISON: 01/06/2023 HISTORY: ORDERING SYSTEM PROVIDED HISTORY: intractable RUQ/epigastric pain TECHNOLOGIST PROVIDED HISTORY: intractable RUQ/epigastric pain Decision Support Exception - unselect if not a suspected or confirmed emergency medical condition->Emergency Medical Condition (MA) Reason for Exam: intractable RUQ/epigastric pain FINDINGS: Lower Chest: Mild bibasilar atelectasis. Pulmonary nodules in the right lung base are stable from prior study measuring up to approximately 10 mm in the right lower lobe. Organs: Liver: Normal appearance of the liver. Gallbladder: Postoperative clips in the right upper quadrant in the gallbladder fossa.   There is hypoattenuation in the region of the gallbladder fossa which could be a postoperative fluid collection, less likely other lesion. This is stable and unchanged from prior study. No significant surrounding inflammatory changes. Residual ductal structure or gallbladder tissue is possible. No common bile duct dilatation seen. Spleen: Unremarkable spleen. Pancreas: No peripancreatic inflammatory changes. Adrenal Glands: No focal adrenal abnormalities identified. Kidneys: No hydronephrosis. Small bilateral renal cysts. Chronic perinephric inflammatory changes bilaterally. GI/Bowel: Stomach: Small hiatal hernia. Small bowel: No evidence of small bowel obstruction. Colon: No signficant pericolonic inflammatory changes. Appendix: Normal appearance of the appendix. Pelvis: No free fluid in the pelvis. Unremarkable pelvic organs. Peritoneum/Retroperitoneum: Atherosclerosis of the abdominal aorta. No retroperitoneal lymphadenopathy by CT criteria. Bones/Soft Tissues: No acute findings within the subcutaneous soft tissues. Spondylosis of the visualized spine. No acute intra-abdominal or pelvic abnormality. No significant change from prior study. Stable hypodensity in the gallbladder fossa, likely chronic postoperative fluid collection or residual ductal structure. No new inflammatory changes. Small hiatal hernia. Physical Exam:    General appearance - NAD, AOx 3   Lungs -CTAB, no R/R/R  Heart - RRR, no M/R/G  Abdomen - Soft, NT/ND  Neurological:  MAEx4, No focal motor deficit, sensory loss  Extremities - Cap refil <2 sec in all ext., no edema  Skin -warm, dry      Hospital Course:  Clinical course has improved, labs and imaging reviewed. Denis Schirmer originally presented to the hospital on 2/5/2023  8:36 AM. with abdominal pains. At that time it was determined that He required further observation and restarting his meds. He was admitted and labs and imaging were followed daily. Imaging results as above.   He is medically stable to be discharged. Disposition: Home    Patient stated that they will not drive themselves home from the hospital if they have gotten pain killers/ narcotics earlier that day and that they will arrange for transportation on their own or work with the  for a ride. Patient counseled NOT to drive while under the influence of narcotics/ pain killers. Condition: Good    Patient stable and ready for discharge home. I have discussed plan of care with patient and they are in understanding. They were instructed to read discharge paperwork. All of their questions and concerns were addressed. Time Spent: 1 day      --  Rene Gonzalez MD  Emergency Medicine Resident Physician    This dictation was generated by voice recognition computer software. Although all attempts are made to edit the dictation for accuracy, there may be errors in the transcription that are not intended.

## 2023-02-07 NOTE — CARE COORDINATION
02/07/23 1420   Readmission Assessment   Number of Days since last admission? 1-7 days   Previous Disposition Home with Family   Who is being Interviewed Patient   What was the patient's/caregiver's perception as to why they think they needed to return back to the hospital? Other (Comment)  (pain)   Did you visit your Primary Care Physician after you left the hospital, before you returned this time? No   Why weren't you able to visit your PCP? Did not have an appointment   Did you see a specialist, such as Cardiac, Pulmonary, Orthopedic Physician, etc. after you left the hospital? No   Who advised the patient to return to the hospital? Self-referral   Does the patient report anything that got in the way of taking their medications?  No   In our efforts to provide the best possible care to you and others like you, can you think of anything that we could have done to help you after you left the hospital the first time, so that you might not have needed to return so soon?   (\"should have stayed the first time\")

## 2023-02-07 NOTE — CARE COORDINATION
Elizabeth Charlton from [address] rehab states that she is working on this patient's precert. [facility] from [place]  [address] and rehab notified writer that the patient's precert has been approved and the patient can come at any time. Dianne from [facility] notified writer that patient will be picked up at 2030. Patient RN and Elizabeth Charlton from [address] are notified. PASRR is completed. Report number is given to the RN ([phone]).

## 2023-02-07 NOTE — PLAN OF CARE
Problem: Chronic Conditions and Co-morbidities  Goal: Patient's chronic conditions and co-morbidity symptoms are monitored and maintained or improved  2/7/2023 0550 by Hannah Rubalcava RN  Outcome: Progressing  2/7/2023 0510 by Hannah Rubalcava RN  Outcome: Progressing     Problem: Pain  Goal: Verbalizes/displays adequate comfort level or baseline comfort level  2/7/2023 0550 by Hannah Rubalcava RN  Outcome: Progressing  2/7/2023 0510 by Hannah Rubalcava RN  Outcome: Progressing     Problem: Skin/Tissue Integrity  Goal: Absence of new skin breakdown  Description: 1. Monitor for areas of redness and/or skin breakdown  2. Assess vascular access sites hourly  3. Every 4-6 hours minimum:  Change oxygen saturation probe site  4. Every 4-6 hours:  If on nasal continuous positive airway pressure, respiratory therapy assess nares and determine need for appliance change or resting period.   2/7/2023 0550 by Hannah Rubalcava RN  Outcome: Progressing  2/7/2023 0510 by Hannah Rubalcava RN  Outcome: Progressing     Problem: Safety - Adult  Goal: Free from fall injury  2/7/2023 0550 by Hannah Rubalcava RN  Outcome: Progressing  2/7/2023 0510 by Hannah Rubalcava RN  Outcome: Progressing

## 2023-02-07 NOTE — PROGRESS NOTES
[facility]  CDU / OBSERVATION ENCOUNTER  ATTENDING NOTE       I performed a history and physical examination of the patient and discussed management with the resident or midlevel provider. I reviewed the resident or midlevel provider's note and agree with the documented findings and plan of care. Any areas of disagreement are noted on the chart. I was personally present for the key portions of any procedures. I have documented in the chart those procedures where I was not present during the key portions. I have reviewed the nurses notes. I agree with the chief complaint, past medical history, past surgical history, allergies, medications, social and family history as documented unless otherwise noted below. The Family history, social history, and ROS are effectively unchanged since admission unless noted elsewhere in the chart. This patient was placed in the observation unit for reevaluation for possible admission to the hospital    Patient improving. Symptoms better. Patient talking about home versus ECF. Patient with significant esophagitis. Anticipating ECF placement. Patient has been accepted and is currently awaiting precertification. No new complaint.     Aden Rojas MD  Attending Emergency  Physician

## 2023-02-07 NOTE — PROGRESS NOTES
Occupational Therapy  Facility/Department: [facility] 3C OBSERVATION  Occupational Therapy Daily Treatment Note    Name: Zbigniew Son  : 1940  MRN: [id number]  Date of Service: 2023    Discharge Recommendations:  Patient would benefit from continued therapy after discharge  OT Equipment Recommendations  Equipment Needed: Yes  Mobility Devices: ADL Assistive Devices  ADL Assistive Devices: Grab Bars - shower;Grab Bars - toilet; Toileting - Drop Arm Commode, Heavy Duty Drop Arm Commode;Reacher;Long-handled Shoe Horn;Long-handled Sponge;Sock-Aid Hard    Patient Diagnosis(es): The encounter diagnosis was Esophagitis. 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 ([facility] [address] CKD (chronic kidney disease) stage 2, GFR 60-89 ml/min, CKD (chronic kidney disease) stage 3, GFR 30-59 ml/min ([facility]), Diverticulosis of colon, GERD (gastroesophageal reflux disease), History of non-ST elevation myocardial infarction (NSTEMI) 2022, Impaired renal function, MRSA (methicillin resistant staph aureus) culture positive, Osteoarthritis of knee, Parkinson disease ([facility] [address] Proteinuria, Skull fracture ([facility] [address] Temporary loss of eyesight, and Tubular adenoma of colon. Past Surgical History:  has a past surgical history that includes Colonoscopy (2012); shoulder surgery (Right); pr colsc flx w/rmvl of tumor polyp lesion snare tq (N/A, 2017); Colonoscopy (2017); Cholecystectomy, laparoscopic (N/A, 10/29/2022); Esophagogastroduodenoscopy (2023); and Upper gastrointestinal endoscopy (N/A, 2023). Assessment   Performance deficits / Impairments: Decreased ADL status; Decreased functional mobility ; Decreased endurance;Decreased strength;Decreased ROM; Decreased safe awareness;Decreased cognition;Decreased balance;Decreased high-level IADLs;Decreased fine motor control  Assessment: Pt will continue to benefit from OT services to address deficits listed in order to regain functional independence in ADLs/IADLs and functional transfers and mobility. Prognosis: Good  REQUIRES OT FOLLOW-UP: Yes  Activity Tolerance  Activity Tolerance: Patient limited by pain;Treatment limited secondary to decreased cognition;Patient limited by fatigue        Plan   Occupational Therapy Plan  Times Per Week: 3-4x/wk  Current Treatment Recommendations: Balance training, Functional mobility training, Endurance training, Safety education & training, Patient/Caregiver education & training, Equipment evaluation, education, & procurement, Self-Care / ADL, Home management training, Cognitive/Perceptual training     Restrictions  Restrictions/Precautions  Restrictions/Precautions: Fall Risk  Required Braces or Orthoses?: No  Position Activity Restriction  Other position/activity restrictions: up w/assist; hx of Parkinson's    Subjective   General  Chart Reviewed: Yes  Patient assessed for rehabilitation services?: Yes  Response to previous treatment: Patient with no complaints from previous session  Family / Caregiver Present: No  Subjective  Subjective: Pt reported a 7/10 pain level in abdomen area. RN notified of pain and pt able to progress with therapy session. General Comment  Comments: RN ok'd for OT session this AM. Pt agreeable to session, pleasant/cooperative throughout. Upon arrival pt supine in bed w/ HOB elevated. Pt sat EOB and completed ADLs, pt reported becoming dizzy and needing to lay down after completing ADLs at EOB. Pt returned supine in bed w/ HOB elevated and dizziness becoming less. Pt had call light within reach, RN notified and all needs met. Objective   O2 Device: None (Room air)    Safety Devices  Type of Devices: Nurse notified;Gait belt;Patient at risk for falls; Left in bed;Call light within reach; Bed alarm in place  Restraints  Restraints Initially in Place: No  Balance  Sitting: With support (Sat EOB ~10-12 minutes total with Min A for static/dynamic sitting, w/ occasional lateral lean d/t dizziness)  Standing:  (BUDDY d/t increased dizziness while pt sat EOB)  Transfer Training  Transfer Training: No (BUDDY d/t increased dizziness while pt sat EOB)  Gait  Overall Level of Assistance:  (BUDDY d/t increased dizziness while pt sat EOB)    ADL  Grooming: Increased time to complete;Setup;Stand by assistance;Verbal cueing  Grooming Skilled Clinical Factors: Pt washed face and completed oral care sitting EOB,. Required cues to wash all parts of face. Limited d/t cognition. UE Dressing: Verbal cueing;Setup; Increased time to complete;Minimal assistance  UE Dressing Skilled Clinical Factors: Pt doffed/donned dirty/clean gown while sitting EOB. Pt threaded arms into gown with Min A d/t cognition. Assisted w/ tying back of gown, and buttoning. Additional Comments: Pt overall limited by decreased endurance, balance, cognition and increased pain. Bed mobility  Supine to Sit: Moderate assistance (Assist for trunk progression and slight LE progression)  Sit to Supine: Minimal assistance (Min Assist for LE progression)  Scooting: Contact guard assistance  Bed Mobility Comments: Increased time/effort, HOB elevated, Limited d/t abd pain    Cognition  Overall Cognitive Status: Exceptions  Following Commands: Follows multistep commands with increased time; Follows one step commands with increased time  Attention Span: Difficulty attending to directions; Difficulty dividing attention; Attends with cues to redirect  Memory: Decreased recall of precautions;Decreased recall of recent events  Safety Judgement: Decreased awareness of need for assistance  Problem Solving: Assistance required to identify errors made;Assistance required to generate solutions;Assistance required to correct errors made  Insights: Decreased awareness of deficits  Initiation: Requires cues for some  Sequencing: Requires cues for some  Orientation  Overall Orientation Status: Within Functional Limits  Orientation Level: Oriented X4;Oriented to place;Oriented to time;Oriented to situation;Oriented to person    Education Given To: Patient  Education Provided: Role of Therapy;Plan of Care;Energy Conservation; Fall Prevention Strategies  Education Provided Comments: Pt educated on OT role, OT POC, activity promotion, safety awareness, balance for sitting EOB, benefits of OOB activities. -fair/poor return  Education Method: Verbal;Demonstration  Barriers to Learning: Cognition;Vision; Hearing  Education Outcome: Demonstrated understanding;Continued education needed    AM-PAC Score  AM-PAC Inpatient Daily Activity Raw Score: 19 (02/07/23 1204)  AM-PAC Inpatient ADL T-Scale Score : 40.22 (02/07/23 1204)  ADL Inpatient CMS 0-100% Score: 42.8 (02/07/23 1204)  ADL Inpatient CMS G-Code Modifier : CK (02/07/23 1204)    Goals  Short Term Goals  Time Frame for Short Term Goals: By discharge, pt will:  Short Term Goal 1: Demo functional sit<>stand transfers and functional mobility with SBA and LRD PRN  Short Term Goal 2: Demo bed mobility sit<>supine with CGA to promote increased engagement in EOB/OOB functional activities  Short Term Goal 3: Demo 8 minutes of dynamic standing balance with CGA to promote increased independence throughout ADLs/IADLs  Short Term Goal 4: Demo UB ADLs with Mod IND and setup  Short Term Goal 5: Demo LB ADLs with SBA, setup and use of DME PRN  Short Term Goal 6: Demo good safety awareness independently throughout all functional activities with <2 VCs each visit       Therapy Time   Individual Concurrent Group Co-treatment   Time In 1055         Time Out 1120         Minutes 25         Timed Code Treatment Minutes: [address] MAVER[address] Mane/SHANIA

## 2023-02-07 NOTE — PROGRESS NOTES
OBS/CDU   RESIDENT NOTE      Patients PCP is:  JW Coulter - FELIPE        SUBJECTIVE      No acute events overnight. Has been able to tolerate a full diet without nausea or vomiting. The patient is urinating on his own and is passing flatus. Denies fever, chills, nausea, vomiting, chest pain, shortness of breath, abdominal pain, focal weakness, numbness, tingling, urinary/bowel symptoms, vision changes, visual hallucinations, or headache. Patient states that the burning sensation in his epigastrium has significantly come down. He feels as though he could go home at this level of discomfort, however we will continue his pain medications, PPIs, and Carafate per GI instructions. Placement still pending  PHYSICAL EXAM      General: NAD, AO X 3  Heent: EMOI, PERRL  Neck: SUPPLE, NO JVD  Cardiovascular: RRR, S1S2  Pulmonary: CTAB, NO SOB  Abdomen: SOFT, NTTP, ND, +BS, no rash over site of pain  Extremities: +2/4 PULSES DISTAL, NO SWELLING  Neuro / Psych: NO NUMBNESS OR TINGLING, MENTATION AT BASELINE    PERTINENT TEST /EXAMS      I have reviewed all available laboratory results.     MEDICATIONS CURRENT   allopurinol (ZYLOPRIM) tablet 100 mg, BID  amitriptyline (ELAVIL) tablet 10 mg, Nightly  atorvastatin (LIPITOR) tablet 40 mg, Daily  butalbital-acetaminophen-caffeine (FIORICET, ESGIC) per tablet 1 tablet, Q4H PRN  carbidopa-levodopa (SINEMET)  MG per tablet 1 tablet, 4x Daily  gabapentin (NEURONTIN) capsule 100 mg, TID  tamsulosin (FLOMAX) capsule 0.4 mg, Daily  sucralfate (CARAFATE) tablet 1 g, 4x Daily  rOPINIRole (REQUIP) tablet 0.25 mg, TID  pantoprazole (PROTONIX) tablet 40 mg, BID AC  metoprolol tartrate (LOPRESSOR) tablet 25 mg, BID  isosorbide mononitrate (IMDUR) extended release tablet 30 mg, Daily  sodium chloride flush 0.9 % injection 5-40 mL, 2 times per day  sodium chloride flush 0.9 % injection 5-40 mL, PRN  0.9 % sodium chloride infusion, PRN  enoxaparin (LOVENOX) injection 40 mg, Daily  ondansetron (ZOFRAN-ODT) disintegrating tablet 4 mg, Q8H PRN   Or  ondansetron (ZOFRAN) injection 4 mg, Q6H PRN  polyethylene glycol (GLYCOLAX) packet 17 g, Daily PRN  acetaminophen (TYLENOL) tablet 650 mg, Q6H PRN   Or  acetaminophen (TYLENOL) suppository 650 mg, Q6H PRN  entacapone (COMTAN) tablet 200 mg, 4x Daily  aluminum & magnesium hydroxide-simethicone (MAALOX) 200-200-20 MG/5ML suspension 30 mL, Q6H PRN        All medication charted and reviewed. CONSULTS      IP CONSULT TO GI    ASSESSMENT/PLAN          Zane Larose is a 79 y.o. male who presents with burning abdominal pain. Abdominal pain  Will continue to control pain per unit protocol  GI consulted recommending PPI and carafate. Thank you for recs  2. Placement  Patient has difficulty with ADLs  Awaiting [facility] at [facility]      Continue home medications and pain control  Monitor vitals, labs, and imaging  DISPO: pending approval for placement    GI:  1. Ok for dysphagia diet as tolerated   2. Restart his protonix and carafate  3. No need for endoscopic intervention inpatient. If patient still having pain then can give viscous lidocaine. 5. Recommend folow up with GI OP for colonoscopy due to multiple polyps seen in 2017      --  Conner Ayala MD  Emergency Medicine Resident Physician     This dictation was generated by voice recognition computer software. Although all attempts are made to edit the dictation for accuracy, there may be errors in the transcription that are not intended.

## 2023-03-02 ENCOUNTER — APPOINTMENT (OUTPATIENT)
Dept: GENERAL RADIOLOGY | Age: 83
DRG: 392 | End: 2023-03-02
Payer: MEDICARE

## 2023-03-02 ENCOUNTER — HOSPITAL ENCOUNTER (INPATIENT)
Age: 83
LOS: 9 days | Discharge: NURSING, REHAB OR LONG-TERM CARE | DRG: 392 | End: 2023-03-13
Attending: EMERGENCY MEDICINE | Admitting: EMERGENCY MEDICINE
Payer: MEDICARE

## 2023-03-02 DIAGNOSIS — R13.10 DYSPHAGIA, UNSPECIFIED TYPE: ICD-10-CM

## 2023-03-02 DIAGNOSIS — R10.13 ABDOMINAL PAIN, EPIGASTRIC: ICD-10-CM

## 2023-03-02 DIAGNOSIS — R07.9 CHEST PAIN, UNSPECIFIED TYPE: Primary | ICD-10-CM

## 2023-03-02 LAB
ABSOLUTE EOS #: 0.08 K/UL (ref 0–0.44)
ABSOLUTE IMMATURE GRANULOCYTE: 0.03 K/UL (ref 0–0.3)
ABSOLUTE LYMPH #: 1.33 K/UL (ref 1.1–3.7)
ABSOLUTE MONO #: 0.64 K/UL (ref 0.1–1.2)
ALBUMIN SERPL-MCNC: 4.4 G/DL (ref 3.5–5.2)
ALBUMIN/GLOBULIN RATIO: 1.6 (ref 1–2.5)
ALP SERPL-CCNC: 123 U/L (ref 40–129)
ALT SERPL-CCNC: 23 U/L (ref 5–41)
ANION GAP SERPL CALCULATED.3IONS-SCNC: 13 MMOL/L (ref 9–17)
AST SERPL-CCNC: 31 U/L
BASOPHILS # BLD: 1 % (ref 0–2)
BASOPHILS ABSOLUTE: 0.04 K/UL (ref 0–0.2)
BILIRUB SERPL-MCNC: 0.6 MG/DL (ref 0.3–1.2)
BUN SERPL-MCNC: 13 MG/DL (ref 8–23)
CALCIUM SERPL-MCNC: 9.4 MG/DL (ref 8.6–10.4)
CHLORIDE SERPL-SCNC: 100 MMOL/L (ref 98–107)
CO2 SERPL-SCNC: 20 MMOL/L (ref 20–31)
CREAT SERPL-MCNC: 1.35 MG/DL (ref 0.7–1.2)
EOSINOPHILS RELATIVE PERCENT: 1 % (ref 1–4)
GFR SERPL CREATININE-BSD FRML MDRD: 52 ML/MIN/1.73M2
GLUCOSE SERPL-MCNC: 131 MG/DL (ref 70–99)
HCT VFR BLD AUTO: 41.2 % (ref 40.7–50.3)
HGB BLD-MCNC: 13.5 G/DL (ref 13–17)
IMMATURE GRANULOCYTES: 0 %
LIPASE SERPL-CCNC: 19 U/L (ref 13–60)
LYMPHOCYTES # BLD: 20 % (ref 24–43)
MCH RBC QN AUTO: 31.8 PG (ref 25.2–33.5)
MCHC RBC AUTO-ENTMCNC: 32.8 G/DL (ref 28.4–34.8)
MCV RBC AUTO: 96.9 FL (ref 82.6–102.9)
MONOCYTES # BLD: 10 % (ref 3–12)
NRBC AUTOMATED: 0 PER 100 WBC
PDW BLD-RTO: 13.7 % (ref 11.8–14.4)
PLATELET # BLD AUTO: 177 K/UL (ref 138–453)
PMV BLD AUTO: 8.6 FL (ref 8.1–13.5)
POTASSIUM SERPL-SCNC: 4.6 MMOL/L (ref 3.7–5.3)
PROT SERPL-MCNC: 7.2 G/DL (ref 6.4–8.3)
RBC # BLD: 4.25 M/UL (ref 4.21–5.77)
REASON FOR REJECTION: NORMAL
SARS-COV-2 RDRP RESP QL NAA+PROBE: NOT DETECTED
SEG NEUTROPHILS: 68 % (ref 36–65)
SEGMENTED NEUTROPHILS ABSOLUTE COUNT: 4.6 K/UL (ref 1.5–8.1)
SODIUM SERPL-SCNC: 133 MMOL/L (ref 135–144)
SPECIMEN DESCRIPTION: NORMAL
TROPONIN I SERPL DL<=0.01 NG/ML-MCNC: 28 NG/L (ref 0–22)
TROPONIN I SERPL DL<=0.01 NG/ML-MCNC: 30 NG/L (ref 0–22)
WBC # BLD AUTO: 6.7 K/UL (ref 3.5–11.3)
ZZ NTE CLEAN UP: ORDERED TEST: NORMAL
ZZ NTE WITH NAME CLEAN UP: SPECIMEN SOURCE: NORMAL

## 2023-03-02 PROCEDURE — 84484 ASSAY OF TROPONIN QUANT: CPT

## 2023-03-02 PROCEDURE — 87635 SARS-COV-2 COVID-19 AMP PRB: CPT

## 2023-03-02 PROCEDURE — 71046 X-RAY EXAM CHEST 2 VIEWS: CPT

## 2023-03-02 PROCEDURE — 83690 ASSAY OF LIPASE: CPT

## 2023-03-02 PROCEDURE — 80053 COMPREHEN METABOLIC PANEL: CPT

## 2023-03-02 PROCEDURE — 99285 EMERGENCY DEPT VISIT HI MDM: CPT

## 2023-03-02 PROCEDURE — 6370000000 HC RX 637 (ALT 250 FOR IP)

## 2023-03-02 PROCEDURE — 93005 ELECTROCARDIOGRAM TRACING: CPT

## 2023-03-02 PROCEDURE — 2580000003 HC RX 258

## 2023-03-02 PROCEDURE — 6370000000 HC RX 637 (ALT 250 FOR IP): Performed by: EMERGENCY MEDICINE

## 2023-03-02 PROCEDURE — G0378 HOSPITAL OBSERVATION PER HR: HCPCS

## 2023-03-02 PROCEDURE — 85025 COMPLETE CBC W/AUTO DIFF WBC: CPT

## 2023-03-02 RX ORDER — CLOPIDOGREL BISULFATE 75 MG/1
75 TABLET ORAL DAILY
Status: DISCONTINUED | OUTPATIENT
Start: 2023-03-02 | End: 2023-03-13 | Stop reason: HOSPADM

## 2023-03-02 RX ORDER — SODIUM CHLORIDE 0.9 % (FLUSH) 0.9 %
5-40 SYRINGE (ML) INJECTION PRN
Status: DISCONTINUED | OUTPATIENT
Start: 2023-03-02 | End: 2023-03-13 | Stop reason: HOSPADM

## 2023-03-02 RX ORDER — ONDANSETRON 2 MG/ML
4 INJECTION INTRAMUSCULAR; INTRAVENOUS EVERY 6 HOURS PRN
Status: DISCONTINUED | OUTPATIENT
Start: 2023-03-02 | End: 2023-03-13 | Stop reason: HOSPADM

## 2023-03-02 RX ORDER — FAMOTIDINE 20 MG/1
20 TABLET, FILM COATED ORAL ONCE
Status: COMPLETED | OUTPATIENT
Start: 2023-03-02 | End: 2023-03-02

## 2023-03-02 RX ORDER — ACETAMINOPHEN 325 MG/1
650 TABLET ORAL EVERY 6 HOURS PRN
Status: DISCONTINUED | OUTPATIENT
Start: 2023-03-02 | End: 2023-03-13 | Stop reason: HOSPADM

## 2023-03-02 RX ORDER — SODIUM CHLORIDE 0.9 % (FLUSH) 0.9 %
5-40 SYRINGE (ML) INJECTION EVERY 12 HOURS SCHEDULED
Status: DISCONTINUED | OUTPATIENT
Start: 2023-03-02 | End: 2023-03-13 | Stop reason: HOSPADM

## 2023-03-02 RX ORDER — ENOXAPARIN SODIUM 100 MG/ML
40 INJECTION SUBCUTANEOUS DAILY
Status: DISCONTINUED | OUTPATIENT
Start: 2023-03-02 | End: 2023-03-13 | Stop reason: HOSPADM

## 2023-03-02 RX ORDER — POLYETHYLENE GLYCOL 3350 17 G/17G
17 POWDER, FOR SOLUTION ORAL DAILY PRN
Status: DISCONTINUED | OUTPATIENT
Start: 2023-03-02 | End: 2023-03-13 | Stop reason: HOSPADM

## 2023-03-02 RX ORDER — ROPINIROLE 0.25 MG/1
0.25 TABLET, FILM COATED ORAL 3 TIMES DAILY
Status: DISCONTINUED | OUTPATIENT
Start: 2023-03-02 | End: 2023-03-03

## 2023-03-02 RX ORDER — SODIUM CHLORIDE 9 MG/ML
INJECTION, SOLUTION INTRAVENOUS PRN
Status: DISCONTINUED | OUTPATIENT
Start: 2023-03-02 | End: 2023-03-13 | Stop reason: HOSPADM

## 2023-03-02 RX ORDER — GABAPENTIN 100 MG/1
100 CAPSULE ORAL 3 TIMES DAILY
Status: DISCONTINUED | OUTPATIENT
Start: 2023-03-02 | End: 2023-03-13 | Stop reason: HOSPADM

## 2023-03-02 RX ORDER — ISOSORBIDE MONONITRATE 30 MG/1
30 TABLET, EXTENDED RELEASE ORAL DAILY
Status: DISCONTINUED | OUTPATIENT
Start: 2023-03-02 | End: 2023-03-13 | Stop reason: HOSPADM

## 2023-03-02 RX ORDER — LIDOCAINE HYDROCHLORIDE 20 MG/ML
15 SOLUTION OROPHARYNGEAL ONCE
Status: COMPLETED | OUTPATIENT
Start: 2023-03-02 | End: 2023-03-02

## 2023-03-02 RX ORDER — MAGNESIUM HYDROXIDE/ALUMINUM HYDROXICE/SIMETHICONE 120; 1200; 1200 MG/30ML; MG/30ML; MG/30ML
30 SUSPENSION ORAL ONCE
Status: COMPLETED | OUTPATIENT
Start: 2023-03-02 | End: 2023-03-02

## 2023-03-02 RX ORDER — ENTACAPONE 200 MG/1
200 TABLET ORAL 4 TIMES DAILY
Status: DISCONTINUED | OUTPATIENT
Start: 2023-03-02 | End: 2023-03-13 | Stop reason: HOSPADM

## 2023-03-02 RX ORDER — ACETAMINOPHEN 650 MG/1
650 SUPPOSITORY RECTAL EVERY 6 HOURS PRN
Status: DISCONTINUED | OUTPATIENT
Start: 2023-03-02 | End: 2023-03-13 | Stop reason: HOSPADM

## 2023-03-02 RX ORDER — ATORVASTATIN CALCIUM 40 MG/1
40 TABLET, FILM COATED ORAL DAILY
Status: DISCONTINUED | OUTPATIENT
Start: 2023-03-02 | End: 2023-03-13 | Stop reason: HOSPADM

## 2023-03-02 RX ORDER — SUCRALFATE 1 G/1
1 TABLET ORAL 4 TIMES DAILY
Status: DISCONTINUED | OUTPATIENT
Start: 2023-03-02 | End: 2023-03-13 | Stop reason: HOSPADM

## 2023-03-02 RX ORDER — AMITRIPTYLINE HYDROCHLORIDE 10 MG/1
10 TABLET, FILM COATED ORAL NIGHTLY
Status: DISCONTINUED | OUTPATIENT
Start: 2023-03-02 | End: 2023-03-13 | Stop reason: HOSPADM

## 2023-03-02 RX ORDER — SUCRALFATE ORAL 1 G/10ML
1 SUSPENSION ORAL ONCE
Status: DISCONTINUED | OUTPATIENT
Start: 2023-03-02 | End: 2023-03-02

## 2023-03-02 RX ORDER — TAMSULOSIN HYDROCHLORIDE 0.4 MG/1
0.4 CAPSULE ORAL DAILY
Status: DISCONTINUED | OUTPATIENT
Start: 2023-03-02 | End: 2023-03-13 | Stop reason: HOSPADM

## 2023-03-02 RX ORDER — PANTOPRAZOLE SODIUM 40 MG/1
40 TABLET, DELAYED RELEASE ORAL
Status: DISCONTINUED | OUTPATIENT
Start: 2023-03-03 | End: 2023-03-03

## 2023-03-02 RX ORDER — SUCRALFATE 1 G/1
1 TABLET ORAL ONCE
Status: COMPLETED | OUTPATIENT
Start: 2023-03-02 | End: 2023-03-02

## 2023-03-02 RX ORDER — ONDANSETRON 4 MG/1
4 TABLET, ORALLY DISINTEGRATING ORAL EVERY 8 HOURS PRN
Status: DISCONTINUED | OUTPATIENT
Start: 2023-03-02 | End: 2023-03-13 | Stop reason: HOSPADM

## 2023-03-02 RX ADMIN — ALUMINUM HYDROXIDE, MAGNESIUM HYDROXIDE, AND SIMETHICONE 30 ML: 200; 200; 20 SUSPENSION ORAL at 13:23

## 2023-03-02 RX ADMIN — ACETAMINOPHEN 650 MG: 325 TABLET ORAL at 21:23

## 2023-03-02 RX ADMIN — ENTACAPONE 200 MG: 200 TABLET, FILM COATED ORAL at 21:26

## 2023-03-02 RX ADMIN — ROPINIROLE HYDROCHLORIDE 0.25 MG: 0.25 TABLET, FILM COATED ORAL at 21:26

## 2023-03-02 RX ADMIN — METOPROLOL TARTRATE 12.5 MG: 25 TABLET ORAL at 21:26

## 2023-03-02 RX ADMIN — SUCRALFATE 1 G: 1 TABLET ORAL at 18:11

## 2023-03-02 RX ADMIN — CARBIDOPA AND LEVODOPA 1 TABLET: 25; 100 TABLET ORAL at 21:27

## 2023-03-02 RX ADMIN — FAMOTIDINE 20 MG: 20 TABLET, FILM COATED ORAL at 13:23

## 2023-03-02 RX ADMIN — SODIUM CHLORIDE, PRESERVATIVE FREE 10 ML: 5 INJECTION INTRAVENOUS at 21:28

## 2023-03-02 RX ADMIN — GABAPENTIN 100 MG: 100 CAPSULE ORAL at 21:28

## 2023-03-02 RX ADMIN — AMITRIPTYLINE HYDROCHLORIDE 10 MG: 10 TABLET, FILM COATED ORAL at 21:27

## 2023-03-02 RX ADMIN — LIDOCAINE HYDROCHLORIDE 15 ML: 20 SOLUTION ORAL; TOPICAL at 13:23

## 2023-03-02 ASSESSMENT — ENCOUNTER SYMPTOMS
VOMITING: 0
ABDOMINAL PAIN: 1
NAUSEA: 1
BLOOD IN STOOL: 0
SHORTNESS OF BREATH: 0
DIARRHEA: 0

## 2023-03-02 ASSESSMENT — PAIN SCALES - GENERAL
PAINLEVEL_OUTOF10: 6

## 2023-03-02 ASSESSMENT — PAIN DESCRIPTION - LOCATION
LOCATION: HEAD
LOCATION: HEAD
LOCATION: ABDOMEN;CHEST

## 2023-03-02 ASSESSMENT — HEART SCORE: ECG: 1

## 2023-03-02 ASSESSMENT — PAIN - FUNCTIONAL ASSESSMENT: PAIN_FUNCTIONAL_ASSESSMENT: 0-10

## 2023-03-02 ASSESSMENT — PAIN DESCRIPTION - PAIN TYPE: TYPE: ACUTE PAIN

## 2023-03-02 ASSESSMENT — PAIN DESCRIPTION - FREQUENCY: FREQUENCY: CONTINUOUS

## 2023-03-02 NOTE — ED NOTES
Spoke with lab, states first troponin is running and should result shortly.       Selena Scheuermann, MARCO ANTONIO  03/02/23 0261

## 2023-03-02 NOTE — ED PROVIDER NOTES
[facility]  ED  Emergency Department Encounter  Emergency Medicine Resident     Pt Name:Mina Beth  MRN: [id number]  [place] 1940  Date of evaluation: 3/2/23  PCP:  JW Andrews CNP  Note Started: 1:18 PM EST      CHIEF COMPLAINT       Chief Complaint   Patient presents with    Chest Pain       HISTORY OF PRESENT ILLNESS  (Location/Symptom, Timing/Onset, Context/Setting, Quality, Duration, Modifying Factors, Severity.)      Sandy Navarro is a 79 y.o. male with a history of CKD, GERD, esophagitis, coronary artery disease, Parkinson's disease who presents via EMS with epigastric pain and chest pain. Patient states his chronic epigastric and chest pain became worse this morning around 2 AM.  Pain does not radiate, rates the pain a 6 out of 10 in severity and states it is sharp. Patient does state he has been taking his home medications for esophagitis. Patient notes nausea and chronic difficulty swallowing. Denies fevers, chills, numbness, weakness, headaches, diarrhea, vomiting. 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 has a past surgical history that includes Colonoscopy (11/02/2012); shoulder surgery (Right); pr colsc flx w/rmvl of tumor polyp lesion snare tq (N/A, 09/19/2017); Colonoscopy (09/19/2017); Cholecystectomy, laparoscopic (N/A, 10/29/2022);  Esophagogastroduodenoscopy (01/09/2023); and Upper gastrointestinal endoscopy (N/A, 1/9/2023). Social History     Socioeconomic History    Marital status: Single     Spouse name: Not on file    Number of children: Not on file    Years of education: Not on file    Highest education level: Not on file   Occupational History    Not on file   Tobacco Use    Smoking status: Former    Smokeless tobacco: Never   Vaping Use    Vaping Use: Never used   Substance and Sexual Activity    Alcohol use: No    Drug use: No    Sexual activity: Not Currently   Other Topics Concern    Not on file   Social History Narrative    Not on file     Social Determinants of Health     Financial Resource Strain: Low Risk     Difficulty of Paying Living Expenses: Not hard at all   Food Insecurity: No Food Insecurity    Worried About Running Out of Food in the Last Year: Never true    [address] in the Last Year: Never true   Transportation Needs: No Transportation Needs    Lack of Transportation (Medical): No    Lack of Transportation (Non-Medical): No   Physical Activity: Inactive    Days of Exercise per Week: 0 days    Minutes of Exercise per Session: 0 min   Stress: Stress Concern Present    Feeling of Stress : To some extent   Social Connections: Socially Isolated    Frequency of Communication with Friends and Family:  Three times a week    Frequency of Social Gatherings with Friends and Family: Not on file    Attends Jainism Services: Never    Active Member of Clubs or Organizations: No    Attends Club or Organization Meetings: Never    Marital Status:    Intimate Partner Violence: Not At Risk    Fear of Current or Ex-Partner: No    Emotionally Abused: No    Physically Abused: No    Sexually Abused: No   Housing Stability: Low Risk     Unable to Pay for Housing in the Last Year: No    Number of [place] in the Last Year: 1    Unstable Housing in the Last Year: No       Family History   Problem Relation Age of Onset    No Known Problems Mother     No Known Problems Father        Allergies:  Aspirin    Home Medications:  Prior to Admission medications    Medication Sig Start Date End Date Taking? Authorizing Provider   entacapone (COMTAN) 200 MG tablet TAKE 1 TABLET BY MOUTH 4 TIMES DAILY 2/28/23   JW Greer CNP   allopurinol (ZYLOPRIM) 100 MG tablet TAKE 1 TABLET BY MOUTH ONCE DAILY 2/28/23   William Head, APRN - CNP   clopidogrel (PLAVIX) 75 MG tablet TAKE 1 TABLET BY MOUTH ONCE DAILY 2/28/23   JW Greer CNP   pantoprazole (PROTONIX) 40 MG tablet Take 1 tablet by mouth 2 times daily (before meals) 2/7/23 3/9/23  Toni Mejias MD   sucralfate (CARAFATE) 1 GM tablet Take 1 tablet by mouth 4 times daily 2/7/23 3/9/23  Toni Mejias MD   tamsulosin [facility]) 0.4 MG capsule Take 1 capsule by mouth daily 10/31/22   Aguilar Rushing DO   gabapentin (NEURONTIN) 100 MG capsule TAKE 1 CAPSULE BY MOUTH 3 TIMES DAILY FOR NERVES 9/22/22 2/5/23  JW Greer CNP   pantoprazole (PROTONIX) 40 MG tablet TAKE 1 TABLET BY MOUTH ONCE DAILY FOR GERD 8/23/22   JW Greer CNP   isosorbide mononitrate (IMDUR) 30 MG extended release tablet TAKE 1 TABLET BY MOUTH ONCE DAILY FOR HYPERTENSION 8/23/22   JW Greer CNP   metoprolol tartrate (LOPRESSOR) 25 MG tablet TAKE ONE HALF (1/2) ATBLETS = 12.5MG BY MOUTH TWICE A DAY FOR HYPERTENSION 8/23/22   JW Greer CNP   carbidopa-levodopa (SINEMET)  MG per tablet TAKE ONE (1) TABLET BY MOUTH FOUR (4) TIMES DAILY 8/9/22   William Head, APRN - CNP   rOPINIRole (REQUIP) 0.25 MG tablet TAKE ONE (1) TABLET BY MOUTH THREE (3) TIMES DAILY 8/8/22   William Head, JW Perrin CNP   atorvastatin (LIPITOR) 40 MG tablet take 1 tablet by mouth once daily 8/4/22   William Head, JW Perrin CNP   nitroGLYCERIN (NITROSTAT) 0.4 MG SL tablet up to max of 3 total doses.  If no relief after 1 dose, call 911. 6/9/22   JW Hope NP   magnesium oxide (MAG-OX) 400 (240 Mg) MG tablet TAKE 1 TABLET BY MOUTH ONCE DAILY 5/23/22   JW Moreno CNP   diclofenac sodium (VOLTAREN) 1 % GEL Apply 2 g topically 2 times daily as needed for Pain (joint pain) 5/9/22   JW Giraldo NP   amitriptyline (ELAVIL) 10 MG tablet Take 1 tablet by mouth nightly 4/5/22   Jeannine Chavira MD   acetaminophen (TYLENOL 8 HOUR ARTHRITIS PAIN) 650 MG extended release tablet Take 1 tablet by mouth every 8 hours as needed for Pain 6/14/21   JW Moreno CNP   butalbital-acetaminophen-caffeine (FIORICET, ESGIC) -40 MG per tablet Take 1 tab prn only for severe headache. Can repeat after 4 hrs if needed. Max 2 tabs/24 hrs. Max 4 tabs/week 6/14/21   JW Moreno CNP         REVIEW OF SYSTEMS       Review of Systems   Constitutional:  Negative for chills and fever. Respiratory:  Negative for shortness of breath. Cardiovascular:  Positive for chest pain. Gastrointestinal:  Positive for abdominal pain and nausea. Negative for blood in stool, diarrhea and vomiting. Neurological:  Negative for dizziness, weakness, numbness and headaches. PHYSICAL EXAM      INITIAL VITALS:   BP (!) 178/104   Pulse 75   Temp 98.1 °F (36.7 °C) (Oral)   Resp 18   Wt 179 lb (81.2 kg)   SpO2 97%   BMI 28.04 kg/m²     Physical Exam  Vitals and nursing note reviewed. Constitutional:       General: He is not in acute distress. Appearance: Normal appearance. Comments: Pill-rolling tremor of bilateral upper extremities   HENT:      Head: Normocephalic and atraumatic. Cardiovascular:      Rate and Rhythm: Regular rhythm. Bradycardia present. Pulses: Normal pulses. Pulmonary:      Effort: Pulmonary effort is normal. No respiratory distress. Breath sounds: Normal breath sounds. Abdominal:      Palpations: Abdomen is soft. Tenderness: There is abdominal tenderness. There is no guarding or rebound. Comments: Epigastric tenderness, no rebound or guarding   Skin:     General: Skin is warm and dry.    Neurological: General: No focal deficit present. Mental Status: He is alert and oriented to person, place, and time. Sensory: No sensory deficit. Motor: No weakness. DDX/DIAGNOSTIC RESULTS / EMERGENCY DEPARTMENT COURSE / [facility]     Medical Decision Making  79-year-old male with history of coronary artery disease, stage III kidney disease, esophagitis, Parkinson's disease presents via EMS with worsening lower chest, epigastric pain and burning. Patient states he has been taking his home medications for esophagitis and has not been helping. Has associated nausea and difficulty swallowing. Mild diffuse weakness. vital signs in the emergency department are within normal limits. On exam patient is resting comfortably in bed with parkinsonian tremor, does have moderate epigastric tenderness. Abdomen is soft, nonperitoneal.  No focal neurodeficits. DDx: Acute on chronic esophagitis, peptic ulcer disease, esophageal rupture, ACS, dysphagia secondary to Parkinson's disease, esophageal spasm, aortic dissection. Plan for cardiac work-up, basic labs, EKG, chest x-ray, GI cocktail. Amount and/or Complexity of Data Reviewed  Labs: ordered. Decision-making details documented in ED Course. Radiology: ordered and independent interpretation performed. Decision-making details documented in ED Course. ECG/medicine tests: ordered and independent interpretation performed. Decision-making details documented in ED Course. Discussion of management or test interpretation with external provider(s): Patient's pain improved temporarily with GI cocktail. Patient's cardiac work-up including chest x-ray and troponins relatively unchanged from prior. Did have a cardiac catheterization in 2022. Suspect that patient's difficulty swallowing is likely due to progression of Parkinson's disease. Burning epigastric pain likely due to acute on chronic esophagitis. Unclear if patient is compliant with his home medications. Patient does have heart score of 5 and significant cardiac risk factors, has not had cardiology evaluation recently. Patient feels uncomfortable with discharge home due to difficulty swallowing and diffuse weakness. Due to patient's concerns and significant medical history without recent evaluation plan for admission to observation unit with consultations to GI, cardiology, neurology    Did have social work evaluate the patient due to concern patient is not able to care for himself at home and possibly not taking his medications. Patient states he lives at home in a private home. Per social work, patient does have skilled nursing visits 1x/week. Per social work, patient lives in a private home with his son works during the day    Risk  OTC drugs. Prescription drug management. Decision regarding hospitalization. History: 0  EC  Patient Age: 1  *Risk factors for Atherosclerotic disease: Hypertension; Coronary Artery Disease  Risk Factors: 1  Troponin: 1  Heart Score Total: 5       EMERGENCY DEPARTMENT COURSE:      ED Course as of 23 1906   Thu Mar 02, 2023   1342 EKG at 1305, rate of 61, normal sinus rhythm, normal axis, AZ interval 186, QTc 420, nonspecific ST changes, unchanged from prior EKG in February of this year [TD]   1351 Chest x-ray without acute changes, cardiopulmonary abnormality [TD]   1448 WBC: 6.7 [TD]   1448 Hemoglobin Quant: 13.5 [TD]   1448 Creatinine at patient's baseline [TD]   1449 On reexam, patient resting comfortably in bed states that the GI cocktail did improve his pain, awaiting lab results [TD]   1449 Lipase: 19 [TD]   1455 Per SW has skilled nursing that visits patient 1x per week  without reported concerns for inability to care for self.  Patient does live with son who works during the day [TD]   1619 Troponin, High Sensitivity(!): 30  Initial trop 28 [TD]   2642 Lab called multiple times d/t delay in troponin resulting, troponin added on to initial lab draw [TD] 1624 Patient's baseline troponin around 25 [TD]   1811 Carafate given for continued burning esophageal pain [TD]   1845 Plan to admit patient to observation for further evaluation by neurology, GI, cardiology [TD]      ED Course User Index  [TD] Jessica Underwood DO         CONSULTS:  IP CONSULT TO CARDIOLOGY  IP CONSULT TO NEUROLOGY  IP CONSULT TO GI        FINAL IMPRESSION      1. Chest pain, unspecified type    2. Abdominal pain, epigastric    3. Dysphagia, unspecified type          DISPOSITION / PLAN     DISPOSITION Admitted 03/02/2023 06:53:11 PM      PATIENT REFERRED TO:  No follow-up provider specified.     DISCHARGE MEDICATIONS:  New Prescriptions    No medications on file       Jessica Underwood DO  Emergency Medicine Resident    (Please note that portions of thisnote were completed with a voice recognition program.  Efforts were made to edit the dictations but occasionally words are mis-transcribed.)      Jessica Underwood DO  Resident  03/02/23 4659 [facility] Corporation[facility]  Resident  03/02/23 1135

## 2023-03-02 NOTE — ED NOTES
Per Resident request, writer reviewed chart to discern home care/need status. Writer spoke with Alies Perez who stated:  >patient has been referred for PT, OT & Skilled Nursing. So far, patient has not received PT/OT but has been seen by skilled nursing. Patient is scheduled to receive skilled nursing once a week for the next 7 weeks.  The next skilled RN visit is not yet scheduled but will likely be this weekend or early next week at the latest  >[facility] home aide service to assist with ADLs, availability is determined by insurance  >[facility] has no reported concerns over patient's ability to care for self or home conditions  >patient lives with his son who works during the day     [email or web address], [address]  03/02/23 [id number]

## 2023-03-02 NOTE — ED PROVIDER NOTES
[facility]     Emergency Department     Faculty Attestation    I performed a history and physical examination of the patient and discussed management with the resident. I reviewed the residents note and agree with the documented findings including all diagnostic interpretations and plan of care. Any areas of disagreement are noted on the chart. I was personally present for the key portions of any procedures. I have documented in the chart those procedures where I was not present during the key portions. I have reviewed the emergency nurses triage note. I agree with the chief complaint, past medical history, past surgical history, allergies, medications, social and family history as documented unless otherwise noted below. Documentation of the HPI, Physical Exam and Medical Decision Making performed by russell is based on my personal performance of the HPI, PE and MDM. For Physician Assistant/ Nurse Practitioner cases/documentation I have personally evaluated this patient and have completed at least one if not all key elements of the E/M (history, physical exam, and MDM). Additional findings are as noted. Primary Care Physician: Kelly Maciel APRN - CNP    VITAL SIGNS:   weight is 179 lb (81.2 kg). His oral temperature is 98.1 °F (36.7 °C). His blood pressure is 141/78 (abnormal) and his pulse is 54. His respiration is 15 and oxygen saturation is 96%. Medical Decision Making  Chest pain, pain swallowing. History of esophagitis with similar presentations. Reports he is taking his medications although it is unclear if he is consistently doing so. Lives alone, has history of parkinsonism as well. On examination tremor consistent with Parkinson's, oropharynx clear, cardiac exam regular rate and rhythm no murmurs rubs gallops, pulmonary clear bilaterally abdomen soft nontender nondistended.   Impression is chest pain likely due to esophagitis however given age and risk factors cardiac work-up will be performed in addition to symptomatic treatment. Amount and/or Complexity of Data Reviewed  External Data Reviewed: labs, ECG and notes. Labs: ordered. Decision-making details documented in ED Course. Radiology: ordered. ECG/medicine tests: ordered. Risk  OTC drugs. Prescription drug management.       EKG Interpretation    Interpreted by me    Rhythm: normal sinus   Rate: normal  Axis: normal  Ectopy: none  Conduction: normal  ST Segments: no acute change  T Waves: no acute change  Q Waves: none    Clinical Impression: no acute changes    Miesha Wall MD, Lauro Sears  Attending Emergency Physician         Derek Norton MD  03/02/23 8259

## 2023-03-02 NOTE — ED NOTES
Pt to ed c/o chest pain, abdominal pain, and acid reflux. Pt states the symptoms have been ongoing x2 weeks. Pt denies n/v on arrival and a&o x4. Pt states complaint with all home medications daily.  Pt placed on full cardiac monitor,  at bedside      Selena Scheuermann, RN  03/02/23 0542

## 2023-03-02 NOTE — ED NOTES
Spoke with lab again about first troponin, states that they are behind and it is in process to result shortly.       Epi Jackson RN  03/02/23 8244

## 2023-03-03 ENCOUNTER — APPOINTMENT (OUTPATIENT)
Dept: GENERAL RADIOLOGY | Age: 83
DRG: 392 | End: 2023-03-03
Payer: MEDICARE

## 2023-03-03 PROBLEM — R13.10 DYSPHAGIA: Status: ACTIVE | Noted: 2023-03-03

## 2023-03-03 LAB
EKG ATRIAL RATE: 51 BPM
EKG ATRIAL RATE: 61 BPM
EKG P AXIS: 61 DEGREES
EKG P AXIS: 92 DEGREES
EKG P-R INTERVAL: 186 MS
EKG P-R INTERVAL: 194 MS
EKG Q-T INTERVAL: 418 MS
EKG Q-T INTERVAL: 468 MS
EKG QRS DURATION: 102 MS
EKG QRS DURATION: 106 MS
EKG QTC CALCULATION (BAZETT): 420 MS
EKG QTC CALCULATION (BAZETT): 431 MS
EKG R AXIS: 22 DEGREES
EKG R AXIS: 25 DEGREES
EKG T AXIS: -12 DEGREES
EKG T AXIS: -27 DEGREES
EKG VENTRICULAR RATE: 51 BPM
EKG VENTRICULAR RATE: 61 BPM

## 2023-03-03 PROCEDURE — 6370000000 HC RX 637 (ALT 250 FOR IP): Performed by: STUDENT IN AN ORGANIZED HEALTH CARE EDUCATION/TRAINING PROGRAM

## 2023-03-03 PROCEDURE — 6360000002 HC RX W HCPCS

## 2023-03-03 PROCEDURE — C9113 INJ PANTOPRAZOLE SODIUM, VIA: HCPCS

## 2023-03-03 PROCEDURE — 92611 MOTION FLUOROSCOPY/SWALLOW: CPT

## 2023-03-03 PROCEDURE — G0378 HOSPITAL OBSERVATION PER HR: HCPCS

## 2023-03-03 PROCEDURE — 99222 1ST HOSP IP/OBS MODERATE 55: CPT | Performed by: PSYCHIATRY & NEUROLOGY

## 2023-03-03 PROCEDURE — 93005 ELECTROCARDIOGRAM TRACING: CPT

## 2023-03-03 PROCEDURE — 2580000003 HC RX 258

## 2023-03-03 PROCEDURE — 99221 1ST HOSP IP/OBS SF/LOW 40: CPT | Performed by: NURSE PRACTITIONER

## 2023-03-03 PROCEDURE — 6370000000 HC RX 637 (ALT 250 FOR IP)

## 2023-03-03 PROCEDURE — 93010 ELECTROCARDIOGRAM REPORT: CPT | Performed by: INTERNAL MEDICINE

## 2023-03-03 PROCEDURE — 74230 X-RAY XM SWLNG FUNCJ C+: CPT

## 2023-03-03 RX ORDER — AMLODIPINE BESYLATE 5 MG/1
5 TABLET ORAL DAILY
Status: DISCONTINUED | OUTPATIENT
Start: 2023-03-03 | End: 2023-03-13 | Stop reason: HOSPADM

## 2023-03-03 RX ORDER — ROPINIROLE 0.25 MG/1
0.5 TABLET, FILM COATED ORAL 3 TIMES DAILY
Status: DISCONTINUED | OUTPATIENT
Start: 2023-03-03 | End: 2023-03-13 | Stop reason: HOSPADM

## 2023-03-03 RX ADMIN — CARBIDOPA AND LEVODOPA 1 TABLET: 25; 100 TABLET ORAL at 16:44

## 2023-03-03 RX ADMIN — CARBIDOPA AND LEVODOPA 1 TABLET: 25; 100 TABLET ORAL at 12:20

## 2023-03-03 RX ADMIN — ISOSORBIDE MONONITRATE 30 MG: 30 TABLET, EXTENDED RELEASE ORAL at 10:21

## 2023-03-03 RX ADMIN — TAMSULOSIN HYDROCHLORIDE 0.4 MG: 0.4 CAPSULE ORAL at 10:22

## 2023-03-03 RX ADMIN — DESMOPRESSIN ACETATE 40 MG: 0.2 TABLET ORAL at 19:55

## 2023-03-03 RX ADMIN — ENTACAPONE 200 MG: 200 TABLET, FILM COATED ORAL at 19:56

## 2023-03-03 RX ADMIN — METOPROLOL TARTRATE 12.5 MG: 25 TABLET ORAL at 19:57

## 2023-03-03 RX ADMIN — AMITRIPTYLINE HYDROCHLORIDE 10 MG: 10 TABLET, FILM COATED ORAL at 19:55

## 2023-03-03 RX ADMIN — CARBIDOPA AND LEVODOPA 1 TABLET: 25; 100 TABLET ORAL at 19:55

## 2023-03-03 RX ADMIN — GABAPENTIN 100 MG: 100 CAPSULE ORAL at 12:20

## 2023-03-03 RX ADMIN — ENTACAPONE 200 MG: 200 TABLET, FILM COATED ORAL at 12:20

## 2023-03-03 RX ADMIN — AMLODIPINE BESYLATE 5 MG: 5 TABLET ORAL at 19:55

## 2023-03-03 RX ADMIN — ENTACAPONE 200 MG: 200 TABLET, FILM COATED ORAL at 16:44

## 2023-03-03 RX ADMIN — GABAPENTIN 100 MG: 100 CAPSULE ORAL at 19:57

## 2023-03-03 RX ADMIN — SUCRALFATE 1 G: 1 TABLET ORAL at 19:55

## 2023-03-03 RX ADMIN — SODIUM CHLORIDE, PRESERVATIVE FREE 10 ML: 5 INJECTION INTRAVENOUS at 19:55

## 2023-03-03 RX ADMIN — SODIUM CHLORIDE, PRESERVATIVE FREE 10 ML: 5 INJECTION INTRAVENOUS at 10:21

## 2023-03-03 RX ADMIN — ROPINIROLE HYDROCHLORIDE 0.5 MG: 0.25 TABLET, FILM COATED ORAL at 19:55

## 2023-03-03 RX ADMIN — SODIUM CHLORIDE, PRESERVATIVE FREE 40 MG: 5 INJECTION INTRAVENOUS at 09:55

## 2023-03-03 RX ADMIN — SUCRALFATE 1 G: 1 TABLET ORAL at 16:47

## 2023-03-03 RX ADMIN — CLOPIDOGREL 75 MG: 75 TABLET, FILM COATED ORAL at 10:20

## 2023-03-03 RX ADMIN — ROPINIROLE HYDROCHLORIDE 0.5 MG: 0.25 TABLET, FILM COATED ORAL at 12:19

## 2023-03-03 RX ADMIN — SUCRALFATE 1 G: 1 TABLET ORAL at 11:57

## 2023-03-03 ASSESSMENT — ENCOUNTER SYMPTOMS
TROUBLE SWALLOWING: 0
VOMITING: 0
NAUSEA: 0
SHORTNESS OF BREATH: 0

## 2023-03-03 NOTE — PROGRESS NOTES
Assessment: Patient was about to go for a test when  visited. Family was not present at the time. Patient was in good spirit and seemed to have strong zoraida in God and in the power of prayer. When asked how he was feeling, patient responded; \"okay. \" Patient said he was raised Sikh and a member of [employer] and [facility] [facility] [employer]. Patient received sacrament of anointing of the sick. Intervention:  maintained listening presence, offered support and prayed with patient. Outcome: Patient was very grateful and thankful for the anointing and blessing he received. Plan: Follow up visits recommended for more prayers and support.

## 2023-03-03 NOTE — CARE COORDINATION
Spoke with Gladis Carrion at [facility], there are no beds available at this facility for LTC

## 2023-03-03 NOTE — PROGRESS NOTES
[facility]  CDU / OBSERVATION ENCOUNTER  ATTENDING NOTE       I performed a history and physical examination of the patient and discussed management with the resident or midlevel provider. I reviewed the resident or midlevel provider's note and agree with the documented findings and plan of care. Any areas of disagreement are noted on the chart. I was personally present for the key portions of any procedures. I have documented in the chart those procedures where I was not present during the key portions. I have reviewed the nurses notes. I agree with the chief complaint, past medical history, past surgical history, allergies, medications, social and family history as documented unless otherwise noted below. The Family history, social history, and ROS are effectively unchanged since admission unless noted elsewhere in the chart. This patient was placed in the observation unit for reevaluation for possible admission to the hospital    Patient with ongoing symptoms. Seen by PT OT and speech therapy today. Modified barium swallow ordered for patient. Patient had further evaluation with case management. Patient has had a history of doing well up once admitted to extended care facility but running out of days, being sent home, and ending back up in the hospital.  Patient has had this a cycle repeated self 3 times. Patient for further evaluation by case management and ancillary services.   Patient for probable placement at extended care facility to ensure compliance    Milka Officer MD  Attending Emergency  Physician

## 2023-03-03 NOTE — CONSULTS
[facility] Neurology   [address] [place] [address]    Neurology Consult Note            Date:   3/3/2023  Patient name:  Jonh Anderson  Date of admission:  3/2/2023  1:02 PM  MRN:   [id number]  Account:  [de-identified]  YOB: 1940  PCP:    JW Holloway CNP  Room:   [address] [place] [address]  Code Status:    Full Code    Chief Complaint:     Chief Complaint   Patient presents with    Chest Pain       History Obtained From:     Patient    History of Present Illness:     24-year-old  male with past medical history significant for head trauma in March 2011 after falling down from a ladder along with subdural hematoma that was treated conservatively, GERD, hypertension, CKD, osteoarthritis of knee, esophagitis, CAD Parkinson's disease on Sinemet ( mg 4 times daily) entacapone (200 mg 4 times daily) presented to ED on 3/2 with complaint of epigastric and chest pain. In the ED, patient EKG was NSR, QTc 420. Troponin 30. Chest x-ray unremarkable. Admitted under observation team for chest pain of unspecified type. Neurology was consulted for Parkinson's disease and increased difficulty swallowing. Of note, patient's follows with me in the clinic and was last seen on 7/22 and was counseled about taking medications. On my exam,patient was awake alert and oriented x3. No cranial nerve deficit noticed. No motor or sensory deficit noticed. Patient has rigidity in the upper and lower extremity. Pill rolling tremor noticed in the b/l hand (R>L). Patient has decreased hand tapping speed in the right hand as compared to the left. As per patient, he has been taking his medications all together in the morning at night. He was counseled about the frequency of sinemat, requip and entacapone. Currently patient has difficulty swallowing and burning sensation. When paitent is stable we can resume medications. He doesn't have any night symptoms.  Plan to increase requip to 0.5mg tid.    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 ([facility] 3/18/2011    d/t being hurt on the job    CKD (chronic kidney disease) stage 2, GFR 60-89 ml/min     CKD (chronic kidney disease) stage 3, GFR 30-59 ml/min (HCC)     Diverticulosis of colon     GERD (gastroesophageal reflux disease)     History of non-ST elevation myocardial infarction (NSTEMI) 6/2022 10/27/2022    Impaired renal function     MRSA (methicillin resistant staph aureus) culture positive 9/23/2015    leg    Osteoarthritis of knee     Left    Parkinson disease ([facility] [address]     Proteinuria     Skull fracture ([facility] [address] 3/18/2011    d/t 12-foot drop on the job    Temporary loss of eyesight     periodically, happened x7    [facility] 2012[facility]        Past Surgical History:     Past Surgical History:   Procedure Laterality Date    CHOLECYSTECTOMY, LAPAROSCOPIC N/A 10/29/2022    LAPROSCOPIC CHOLECYSTECTOMY performed by Tawanda Johnson DO at [address] [place] [address]  11/02/2012    poor prep, tubular adenoma    COLONOSCOPY  09/19/2017    diverticulosis, multiple polyps as described, spot marking done, pathology-tubular adenoma x 4    ESOPHAGOGASTRODUODENOSCOPY  01/09/2023    MI COLSC FLX W/RMVL OF TUMOR POLYP LESION SNARE TQ N/A 09/19/2017    COLONOSCOPY POLYPECTOMY SNARE/COLD BIOPSY with tatooing and apc transverse colon performed by Kevon Cadena MD at [address] [place] [address] Right     rotator cuff    UPPER GASTROINTESTINAL ENDOSCOPY N/A 1/9/2023    EGD ESOPHAGOGASTRODUODENOSCOPY performed by Teena Mccarthy MD at [place] [address]        Medications Prior to Admission:     Prior to Admission medications    Medication Sig Start Date End Date Taking?  Authorizing Provider   entacapone (COMTAN) 200 MG tablet TAKE 1 TABLET BY MOUTH 4 TIMES DAILY 2/28/23   JW Fairchild - CNP   allopurinol (ZYLOPRIM) 100 MG tablet TAKE 1 TABLET BY MOUTH ONCE DAILY 2/28/23   Willim Fothergill JW Singh CNP   clopidogrel (PLAVIX) 75 MG tablet TAKE 1 TABLET BY MOUTH ONCE DAILY 2/28/23   JW Fairchild CNP   pantoprazole (PROTONIX) 40 MG tablet Take 1 tablet by mouth 2 times daily (before meals) 2/7/23 3/9/23  Lisa Acosta MD   sucralfate (CARAFATE) 1 GM tablet Take 1 tablet by mouth 4 times daily 2/7/23 3/9/23  Lisa Acosta MD   tamsulosin [facility]) 0.4 MG capsule Take 1 capsule by mouth daily 10/31/22   Jesus Davis DO   gabapentin (NEURONTIN) 100 MG capsule TAKE 1 CAPSULE BY MOUTH 3 TIMES DAILY FOR NERVES 9/22/22 2/5/23  JW Fairchild CNP   pantoprazole (PROTONIX) 40 MG tablet TAKE 1 TABLET BY MOUTH ONCE DAILY FOR GERD 8/23/22   JW Fairchild CNP   isosorbide mononitrate (IMDUR) 30 MG extended release tablet TAKE 1 TABLET BY MOUTH ONCE DAILY FOR HYPERTENSION 8/23/22   JW Fairchild CNP   metoprolol tartrate (LOPRESSOR) 25 MG tablet TAKE ONE HALF (1/2) ATBLETS = 12.5MG BY MOUTH TWICE A DAY FOR HYPERTENSION 8/23/22   JW Fairchild CNP   carbidopa-levodopa (SINEMET)  MG per tablet TAKE ONE (1) TABLET BY MOUTH FOUR (4) TIMES DAILY 8/9/22   JW Fairchild CNP   rOPINIRole (REQUIP) 0.25 MG tablet TAKE ONE (1) TABLET BY MOUTH THREE (3) TIMES DAILY 8/8/22   JW Fairchild CNP   atorvastatin (LIPITOR) 40 MG tablet take 1 tablet by mouth once daily 8/4/22   JW Fairchild CNP   nitroGLYCERIN (NITROSTAT) 0.4 MG SL tablet up to max of 3 total doses.  If no relief after 1 dose, call 911. 6/9/22   JW Womack NP   magnesium oxide (MAG-OX) 400 (240 Mg) MG tablet TAKE 1 TABLET BY MOUTH ONCE DAILY 5/23/22   JW Fairchild CNP   diclofenac sodium (VOLTAREN) 1 % GEL Apply 2 g topically 2 times daily as needed for Pain (joint pain) 5/9/22   JW Phillip NP   amitriptyline (ELAVIL) 10 MG tablet Take 1 tablet by mouth nightly 4/5/22   Sara Junior MD   acetaminophen (TYLENOL 8 HOUR ARTHRITIS PAIN) 650 MG extended release tablet Take 1 tablet by mouth every 8 hours as needed for Pain 21   Tia Rodriguez APRJOVON - CNP   butalbital-acetaminophen-caffeine (FIORICET, ESGIC) -40 MG per tablet Take 1 tab prn only for severe headache. Can repeat after 4 hrs if needed. Max 2 tabs/24 hrs. Max 4 tabs/week 21   JW Devlin CNP        Allergies:     Aspirin    Social History:     Tobacco:    reports that he has quit smoking. He has never used smokeless tobacco.  Alcohol:      reports no history of alcohol use. Drug Use:  reports no history of drug use. Family History:     Family History   Problem Relation Age of Onset    No Known Problems Mother     No Known Problems Father        Review of Systems:     Review of Systems   Constitutional:  Positive for fatigue. HENT:  Positive for drooling. Negative for trouble swallowing. Respiratory:  Negative for shortness of breath. Cardiovascular:  Positive for chest pain. Gastrointestinal:  Negative for nausea and vomiting. Genitourinary:  Negative for dysuria and urgency. Neurological:  Positive for weakness. Negative for tremors, facial asymmetry, light-headedness and numbness. Psychiatric/Behavioral:  Positive for sleep disturbance. Negative for behavioral problems and decreased concentration. Physical Exam:   BP (!) 154/83   Pulse 63   Temp 97.2 °F (36.2 °C) (Oral)   Resp 18   Wt 179 lb (81.2 kg)   SpO2 98%   BMI 28.04 kg/m²   Temp (24hrs), Av.7 °F (36.5 °C), Min:97.2 °F (36.2 °C), Max:98.1 °F (36.7 °C)    No results for input(s): POCGLU in the last 72 hours. No intake or output data in the 24 hours ending 23 0803      Neurologic Exam    GENERAL  Lying comfortably in the bed. Mildy anxious. Hypophonic speech.     HEENT  NC/ AT   HEART  S1 and S2 heard; palpation of pulses: radial pulse    NECK  Supple and no bruits heard   MENTAL STATUS:  Alert, oriented, intact memory, no confusion, hypophonic speech normal language, no hallucination or delusion CRANIAL NERVES: II     -      Visual fields intact to confrontation  III,IV,VI -  PERR, EOMs full, no ptosis  V     -     Normal facial sensation   VII    -     Normal facial symmetry  VIII   -     Intact hearing   IX,X -     Symmetrical palate  XI    -     Symmetrical shoulder shrug  XII   -     Midline tongue, no atrophy    MOTOR FUNCTION: RUE: Significant for good strength of grade 5/5 in proximal and distal muscle groups   LUE: Significant for good strength of grade 5/5 in proximal and distal muscle groups   RLE: Significant for good strength of grade 5/5 in proximal and distal muscle groups   LLE: Significant for good strength of grade 5/5 in proximal and distal muscle groups         SENSORY FUNCTION:  Normal touch, normal pinprick, normal vibration, normal proprioception   CEREBELLAR FUNCTION:  Resting pill rolling tremor noticed in b/l upper extremity (R hand > L hand). Patient has decreased hand tapping speed in the right hand as compared to the left.    REFLEX FUNCTION:  Symmetric in upper and lower extremities, no Babinski sign   STATION and GAIT  deffered       Investigations:      Laboratory Testing:  Recent Results (from the past 24 hour(s))   Lipase    Collection Time: 03/02/23  1:21 PM   Result Value Ref Range    Lipase 19 13 - 60 U/L   Comprehensive Metabolic Panel    Collection Time: 03/02/23  1:21 PM   Result Value Ref Range    Glucose 131 (H) 70 - 99 mg/dL    BUN 13 8 - 23 mg/dL    Creatinine 1.35 (H) 0.70 - 1.20 mg/dL    Est, Glom Filt Rate 52 (L) >60 mL/min/1.73m2    Calcium 9.4 8.6 - 10.4 mg/dL    Sodium 133 (L) 135 - 144 mmol/L    Potassium 4.6 3.7 - 5.3 mmol/L    Chloride 100 98 - 107 mmol/L    CO2 20 20 - 31 mmol/L    Anion Gap 13 9 - 17 mmol/L    Alkaline Phosphatase 123 40 - 129 U/L    ALT 23 5 - 41 U/L    AST 31 <40 U/L    Total Bilirubin 0.6 0.3 - 1.2 mg/dL    Total Protein 7.2 6.4 - 8.3 g/dL    Albumin 4.4 3.5 - 5.2 g/dL    Albumin/Globulin Ratio 1.6 1.0 - 2.5   SPECIMEN REJECTION Collection Time: 03/02/23  1:21 PM   Result Value Ref Range    Specimen Source . BLOOD     Ordered Test CDP     Reason for Rejection Unable to perform testing: Specimen clotted. Troponin    Collection Time: 03/02/23  1:21 PM   Result Value Ref Range    Troponin, High Sensitivity 28 (H) 0 - 22 ng/L   CBC with Auto Differential    Collection Time: 03/02/23  2:07 PM   Result Value Ref Range    WBC 6.7 3.5 - 11.3 k/uL    RBC 4.25 4.21 - 5.77 m/uL    Hemoglobin 13.5 13.0 - 17.0 g/dL    Hematocrit 41.2 40.7 - 50.3 %    MCV 96.9 82.6 - 102.9 fL    MCH 31.8 25.2 - 33.5 pg    MCHC 32.8 28.4 - 34.8 g/dL    RDW 13.7 11.8 - 14.4 %    Platelets 482 128 - 757 k/uL    MPV 8.6 8.1 - 13.5 fL    NRBC Automated 0.0 0.0 per 100 WBC    Seg Neutrophils 68 (H) 36 - 65 %    Lymphocytes 20 (L) 24 - 43 %    Monocytes 10 3 - 12 %    Eosinophils % 1 1 - 4 %    Basophils 1 0 - 2 %    Immature Granulocytes 0 0 %    Segs Absolute 4.60 1.50 - 8.10 k/uL    Absolute Lymph # 1.33 1.10 - 3.70 k/uL    Absolute Mono # 0.64 0.10 - 1.20 k/uL    Absolute Eos # 0.08 0.00 - 0.44 k/uL    Basophils Absolute 0.04 0.00 - 0.20 k/uL    Absolute Immature Granulocyte 0.03 0.00 - 0.30 k/uL   Troponin    Collection Time: 03/02/23  3:25 PM   Result Value Ref Range    Troponin, High Sensitivity 30 (H) 0 - 22 ng/L   COVID-19, Rapid    Collection Time: 03/02/23  7:27 PM    Specimen: Nasopharyngeal Swab   Result Value Ref Range    Specimen Description . NASOPHARYNGEAL SWAB     SARS-CoV-2, Rapid Not Detected Not Detected   EKG 12 Lead    Collection Time: 03/03/23  6:16 AM   Result Value Ref Range    Ventricular Rate 51 BPM    Atrial Rate 51 BPM    P-R Interval 194 ms    QRS Duration 106 ms    Q-T Interval 468 ms    QTc Calculation (Bazett) 431 ms    P Axis 92 degrees    R Axis 22 degrees    T Axis -12 degrees         Assessment :      Primary Problem  Chest pain    Active Hospital Problems    Diagnosis Date Noted    Chest pain [R07.9] 03/02/2023     Priority: Medium 65-year-old  male with past medical history significant for head trauma in March 2011 after falling down from a ladder along with subdural hematoma that was treated conservatively, GERD, hypertension, CKD, osteoarthritis of knee, esophagitis, CAD Parkinson's disease on Sinemet ( mg 4 times daily) entacapone (200 mg 4 times daily) presented to ED on 3/2 with complaint of epigastric and chest pain. Neurology was consulted for Parkinson's disease and increased difficulty swallowing. Chest pain unspecified type  Parkinson's disease  Concern for difficulty swallowing. History of postconcussive headaches    Plan:     Continue sinemat  mg 4 times a day. Please take it when wake up 730 (pt wakes around this time), 12 midday, 4 pm and at bedtime. Continue entacapone 200 mg 4 times a day. Please take it when wake up 730 (pt wakes around this time), 12 midday, 4pm and at bedtime. Requip increased from 0.25mg to 0.5mg three times a day. We will see how patient's responds to it and we will go from there. Rest of the care as per primary team.       Consultations:   IP CONSULT TO CARDIOLOGY  IP CONSULT TO NEUROLOGY  IP CONSULT TO GI      Follow-up further recommendations after discussing the case with attending  The plan was discussed with the patient, patient's family and the medical staff. Patient is admitted as inpatient status because of co-morbidities listed above, severity of signs and symptoms as outlined, requirement for current medical therapies and most importantly because of direct risk to patient if care not provided in a hospital setting.     Lilo Chambers MD  Neurology Resident PGY-3   3/3/2023  8:03 AM    Copy sent to Dr. Kelly Maciel, APRN - CNP

## 2023-03-03 NOTE — ED NOTES
The following labs were labeled with appropriate pt sticker and tubed to lab:     [] Blue     [] Lavender   [] on ice  [] Green/yellow  [] Green/black [] on ice  [] Bhat Angel  [] on ice  [] Yellow  [] Red  [] Type/ Screen  [] ABG  [] VBG    [x] COVID-19 swab    [x] Rapid  [] PCR  [] Flu swab  [] Peds Viral Panel     [] Urine Sample  [] Fecal Sample  [] Pelvic Cultures  [] Blood Cultures  [] X 2  [] STREP Cultures       Zane Turner RN  03/02/23 1925

## 2023-03-03 NOTE — CONSULTS
[address]  Gastroenterology  Consultation Note     . Chief Complaint:  Chest pain, odynophagia    Reason for consult:    History of esophagitis  Uncontrolled epigastric pain    History of present illness: This is a 79 y.o. male with PMH including Parkinson's, grade D reflux esophagitis with esophageal ulcerations diagnosed on endoscopy 1/9/2023, chronic who presented to the ER with complaints of chest pain and odynophagia. Patient states that he is experiencing odynophagia and dysphagia with solids and liquids. Patient recently had endoscopy in January that showed severe grade D esophagitis with ulceration. He was started on Protonix 40 mg twice daily. Patient states that he takes all medications as ordered because they are bubble packed and shipped from the pharmacy. He is currently residing at [facility]  Patient denies any hematemesis, blood in stool, abdominal pain, fevers or chills. No oral lesions noted and oropharynx. ,  Patient denies any weight loss    No abdominal imaging completed at this time    BMP shows sodium of 133, creatinine 1.35, LFTs are normal, CBC is normal      Previous GI history:   1/6/2023 EGD per Dr. Keo Welch  Findings[de-identified]   Esophagus: Severe grade D reflux esophagitis with esophageal ulcerations. Esophagus otherwise normal.   Stomach: Small sliding hiatal hernia. Stomach otherwise normal.   Duodenum: normal     Recommendations: PPI BID for 8 weeks and then daily indefinitely. Antireflux diet. OK to discharge from GI standpoint.       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 ([facility] [address] 3/18/2011    d/t being hurt on the job    CKD (chronic kidney disease) stage 2, GFR 60-89 ml/min     CKD (chronic kidney disease) stage 3, GFR 30-59 ml/min ([facility])     Diverticulosis of colon     GERD (gastroesophageal reflux disease)     History of non-ST elevation myocardial infarction (NSTEMI) 6/2022 10/27/2022    Impaired renal function     MRSA (methicillin resistant staph aureus) culture positive 9/23/2015    leg    Osteoarthritis of knee     Left    Parkinson disease ([facility] [address]     Proteinuria     Skull fracture ([facility] [address] 3/18/2011    d/t 12-foot drop on the job    Temporary loss of eyesight     periodically, happened x7    Tubular adenoma of colon 2012, 2017    mulitple     Past Surgical History:   Procedure Laterality Date    CHOLECYSTECTOMY, LAPAROSCOPIC N/A 10/29/2022    LAPROSCOPIC CHOLECYSTECTOMY performed by Daniel Mcdermott DO at [address]  11/02/2012    poor prep, tubular adenoma    COLONOSCOPY  09/19/2017    diverticulosis, multiple polyps as described, spot marking done, pathology-tubular adenoma x 4    ESOPHAGOGASTRODUODENOSCOPY  01/09/2023    [place] COLSC FLX W/RMVL OF TUMOR POLYP LESION SNARE TQ N/A 09/19/2017    COLONOSCOPY POLYPECTOMY SNARE/COLD BIOPSY with tatooing and apc transverse colon performed by Ama Anthony MD at [address] [place] [address] Right     rotator cuff    UPPER GASTROINTESTINAL ENDOSCOPY N/A 1/9/2023    EGD ESOPHAGOGASTRODUODENOSCOPY performed by Jem Marcano MD at [facility] History   Problem Relation Age of Onset    No Known Problems Mother     No Known Problems Father      Previous records/history/ and notes were reviewed    Allergies: Allergies   Allergen Reactions    Aspirin      Brain bleed         Home medications:  Prior to Admission medications    Medication Sig Start Date End Date Taking?  Authorizing Provider   entacapone (COMTAN) 200 MG tablet TAKE 1 TABLET BY MOUTH 4 TIMES DAILY 2/28/23   JW Sims CNP   allopurinol (ZYLOPRIM) 100 MG tablet TAKE 1 TABLET BY MOUTH ONCE DAILY 2/28/23   JW Sims CNP   clopidogrel (PLAVIX) 75 MG tablet TAKE 1 TABLET BY MOUTH ONCE DAILY 2/28/23   JW Sims CNP   pantoprazole (PROTONIX) 40 MG tablet Take 1 tablet by mouth 2 times daily (before meals) 2/7/23 3/9/23  Emre Meade MD   sucralfate (CARAFATE) 1 GM tablet Take 1 tablet by mouth 4 times daily 2/7/23 3/9/23  Emre Meade MD   tamsulosin [facility]) 0.4 MG capsule Take 1 capsule by mouth daily 10/31/22   Meli Peter DO   gabapentin (NEURONTIN) 100 MG capsule TAKE 1 CAPSULE BY MOUTH 3 TIMES DAILY FOR NERVES 9/22/22 2/5/23  JW Lockwood CNP   pantoprazole (PROTONIX) 40 MG tablet TAKE 1 TABLET BY MOUTH ONCE DAILY FOR GERD 8/23/22   JW Lockwood CNP   isosorbide mononitrate (IMDUR) 30 MG extended release tablet TAKE 1 TABLET BY MOUTH ONCE DAILY FOR HYPERTENSION 8/23/22   JW Lockwood CNP   metoprolol tartrate (LOPRESSOR) 25 MG tablet TAKE ONE HALF (1/2) ATBLETS = 12.5MG BY MOUTH TWICE A DAY FOR HYPERTENSION 8/23/22   JW Lockwood CNP   carbidopa-levodopa (SINEMET)  MG per tablet TAKE ONE (1) TABLET BY MOUTH FOUR (4) TIMES DAILY 8/9/22   JW Lockwood CNP   rOPINIRole (REQUIP) 0.25 MG tablet TAKE ONE (1) TABLET BY MOUTH THREE (3) TIMES DAILY 8/8/22   JW Lockwood CNP   atorvastatin (LIPITOR) 40 MG tablet take 1 tablet by mouth once daily 8/4/22   JW Lockwood CNP   nitroGLYCERIN (NITROSTAT) 0.4 MG SL tablet up to max of 3 total doses.  If no relief after 1 dose, call 911. 6/9/22   JW Coker NP   magnesium oxide (MAG-OX) 400 (240 Mg) MG tablet TAKE 1 TABLET BY MOUTH ONCE DAILY 5/23/22   JW Lockwood CNP   diclofenac sodium (VOLTAREN) 1 % GEL Apply 2 g topically 2 times daily as needed for Pain (joint pain) 5/9/22   JW Clarke NP   amitriptyline (ELAVIL) 10 MG tablet Take 1 tablet by mouth nightly 4/5/22   Ella Causey MD   acetaminophen (TYLENOL 8 HOUR ARTHRITIS PAIN) 650 MG extended release tablet Take 1 tablet by mouth every 8 hours as needed for Pain 6/14/21   JW Lockwood - CNP   butalbital-acetaminophen-caffeine (FIORICET, ESGIC) -40 MG per tablet Take 1 tab prn only for severe headache. Can repeat after 4 hrs if needed. Max 2 tabs/24 hrs. Max 4 tabs/week 6/14/21   [place] Olsen, APRN - CNP     . Current Medications:  Scheduled Meds:   sodium chloride flush  5-40 mL IntraVENous 2 times per day    enoxaparin  40 mg SubCUTAneous Daily    amitriptyline  10 mg Oral Nightly    atorvastatin  40 mg Oral Daily    carbidopa-levodopa  1 tablet Oral 4x Daily    clopidogrel  75 mg Oral Daily    entacapone  200 mg Oral 4x Daily    gabapentin  100 mg Oral TID    isosorbide mononitrate  30 mg Oral Daily    metoprolol tartrate  12.5 mg Oral BID    pantoprazole  40 mg Oral BID AC    rOPINIRole  0.25 mg Oral TID    sucralfate  1 g Oral 4x Daily    tamsulosin  0.4 mg Oral Daily     . Continuous Infusions:   sodium chloride       . PRN Meds:sodium chloride flush, sodium chloride, ondansetron **OR** ondansetron, polyethylene glycol, acetaminophen **OR** acetaminophen    REVIEW OF SYSTEMS:     Constitutional: No fever, no chills, no lethargy, no weakness, no weight loss  HEENT:  Odynophagia and dysphagia  Cardiac:  No chest pain, dyspnea, orthopnea or PND. Chest:   No cough, phlegm or wheezing. Abdomen:  No abdominal pain, nausea, vomiting, constipation, diarrhea, hematochezia/melena  Neuro:  No focal weakness, abnormal movements or seizure like activity. Skin:   No rashes, no itching. :   No hematuria, no pyuria, no dysuria, no flank pain. Extremities:  No swelling or joint pains. ROS was otherwise negative except as mentioned in the [address] PHYSICAL EXAM:    /72   Pulse 52   Temp 97.9 °F (36.6 °C) (Oral)   Resp 16   Wt 179 lb (81.2 kg)   SpO2 94%   BMI 28.04 kg/m²   . TMAX[24]    General: Well developed, Well nourished, No apparent distress  Head:  Normocephalic, Atraumatic  EENT: EOMI, Sclera not icteric, Oropharynx moist  Neck:  Supple, Trachea midline  Lungs:CTA Bilaterally  Heart: RRR, No murmur, No rub, No gallop, PMI nondisplaced.    Abdomen:Soft, Non tender, Not distended, BS WNL,  No masses. No hepatomegalia   Ext:No clubbing. No cyanosis. No edema. Skin: No rashes. No jaundice. No stigmata of liver disease. Neuro:  A&O x Three, No focal neurological deficits    Imaging:       Hemotological labs: Anemia studies:  No results for input(s): LABIRON, TIBC, FERRITIN, [id number], FOLATE, OCCULTBLD in the last 72 hours. CBC:  Recent Labs     03/02/23  1407   WBC 6.7   HGB 13.5   MCV 96.9   RDW 13.7          PT/INR:  No results for input(s): PROTIME, INR in the last 72 hours. BMP:  Recent Labs     03/02/23  1321   *   K 4.6      CO2 20   BUN 13   CREATININE 1.35*   GLUCOSE 131*   CALCIUM 9.4       Liver work up:  Hepatitis Functional Panel:  Recent Labs     03/02/23  1321   ALKPHOS 123   ALT 23   AST 31   PROT 7.2   BILITOT 0.6   LABALBU 4.4       Amylase/Lipase/Ammonia:  Recent Labs     03/02/23  1321   LIPASE 19       Acute Hepatitis Panel:  Lab Results   Component Value Date/Time    HEPBSAG NONREACTIVE 07/30/2012 12:10 PM    HEPCAB NONREACTIVE 07/30/2012 12:10 PM    HEPBIGM NONREACTIVE 07/30/2012 12:10 PM    HEPAIGM NONREACTIVE 07/30/2012 12:10 PM            Principal Problem:    Chest pain  Resolved Problems:    * No resolved hospital problems. *       GI Impression:  50-year-old male with ongoing odynophagia and dysphagia-most likely this is due to previously identified severe grade D esophagitis with ulceration on EGD approximately 2 months ago. Due to the severity, most likely patient needs to continue on Protonix twice daily for longer course of treatment. -PMH includes Parkinson's disease-spoke with neurology who feel that patient may have a component of advancing Parkinson's disease that is affecting his ability to swallow also. Recommendations and plan:    No indications at this time for repeat endoscopy is most likely patient simply needs a longer course of treatment  Continue Protonix 40 mg twice daily 30 minutes before meals  Soft diet. Supplements 2-3 times daily until patient is able to swallow more food. Appreciate input and recommendations from neurology  Patient will need to follow-up in the GI clinic in 1 month with Dr. Sulma Blount. No further recommendations at this time GI will sign off    This plan was formulated in collaboration with Dr. Sirisha Espinoza MD  Please feel free to contact us with any questions or concerns      [address] [facility]  [facility], [address] [place] [address]   [phone]  3/3/2023    8:37 AM     Estimated time of   mins reviewing chart, assessing patient and formulating plan of care    This note was created with the assistance of a speech-recognition program.  Although the intention is to generate a document that actually reflects the content of the visit, no guarantees can be provided that every mistake has been identified and corrected by editing.

## 2023-03-03 NOTE — H&P
[facility] [address] [place] [address]  NOTE     Pt Name: Juliana Alexander  MRN: [id number]  [place] 1940  Date of evaluation: 3/3/23  Patient's PCP is :  JW Sykes - CNP    CHIEF COMPLAINT       Chief Complaint   Patient presents with    Chest Pain         HISTORY OF [address] Noreen Lee is a 79 y.o. male with a history of CKD, GERD, esophagitis, CAD, and Parkinson's disease who presents via EMS with epigastric pain and chest pain. Patient states his chronic epigastric and chest pain became worse this morning (3/2) around 2 AM.  Pain does not radiate, rates the pain a 6 out of 10 in severity and states it is sharp. Patient does state he has been taking his home medications for esophagitis. Patient has had multiple admissions and discharges to SNF from [facility] [address] discharge from SNF he quickly comes back to the hospital.  In discussion with case management about pursuing a long-term care facility for patient rather than living at family's home. Location/Symptom: Acute on chronic  Timing/Onset: Yesterday morning  Provocation: Unknown the patient  Quality: Epigastric and chest pain  Radiation: None  Severity: Moderate  Timing/Duration: Continuous  Modifying Factors: Medications    History was obtained in part through review of the ED chart.  When possible, a direct discussion was had with ED nurses, residents, and attendings  REVIEW OF SYSTEMS       General ROS - No fevers, No malaise   Ophthalmic ROS - No discharge, No changes in vision  ENT ROS -some pain with swallowing, No rhinorrhea,   Respiratory ROS - no shortness of breath, no cough, no  wheezing  Cardiovascular ROS - No chest pain, no dyspnea on exertion  Gastrointestinal ROS -mild abdominal pain, mild nausea or vomiting, no change in bowel habits, no black or bloody stools  Genito-Urinary ROS - No dysuria, trouble voiding, or hematuria  Musculoskeletal ROS - No myalgias, No arthalgias  Neurological ROS - No headache, no dizziness/lightheadedness, No focal weakness, no loss of sensation  Dermatological ROS - No lesions, No rash     (PQRS) Advance directives on face sheet per hospital policy. 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 I have reviewed the past medical history with the patient and it is pertinent to this complaint. SURGICAL HISTORY      has a past surgical history that includes Colonoscopy (11/02/2012); shoulder surgery (Right); pr colsc flx w/rmvl of tumor polyp lesion snare tq (N/A, 09/19/2017); Colonoscopy (09/19/2017); Cholecystectomy, laparoscopic (N/A, 10/29/2022); Esophagogastroduodenoscopy (01/09/2023); and Upper gastrointestinal endoscopy (N/A, 1/9/2023). I have reviewed and agree with Surgical History entered and it is pertinent to this complaint.      CURRENT MEDICATIONS     sodium chloride flush 0.9 % injection 5-40 mL, 2 times per day  sodium chloride flush 0.9 % injection 5-40 mL, PRN  0.9 % sodium chloride infusion, PRN  enoxaparin (LOVENOX) injection 40 mg, Daily  ondansetron (ZOFRAN-ODT) disintegrating tablet 4 mg, Q8H PRN   Or  ondansetron (ZOFRAN) injection 4 mg, Q6H PRN  polyethylene glycol (GLYCOLAX) packet 17 g, Daily PRN  acetaminophen (TYLENOL) tablet 650 mg, Q6H PRN   Or  acetaminophen (TYLENOL) suppository 650 mg, Q6H PRN  amitriptyline (ELAVIL) tablet 10 mg, Nightly  atorvastatin (LIPITOR) tablet 40 mg, Daily  carbidopa-levodopa (SINEMET)  MG per tablet 1 tablet, 4x Daily  clopidogrel (PLAVIX) tablet 75 mg, Daily  entacapone (COMTAN) tablet 200 mg, 4x Daily  gabapentin (NEURONTIN) capsule 100 mg, TID  isosorbide mononitrate (IMDUR) extended release tablet 30 mg, Daily  metoprolol tartrate (LOPRESSOR) tablet 12.5 mg, BID  pantoprazole (PROTONIX) tablet 40 mg, BID AC  rOPINIRole (REQUIP) tablet 0.25 mg, TID  sucralfate (CARAFATE) tablet 1 g, 4x Daily  tamsulosin (FLOMAX) capsule 0.4 mg, Daily        All medication charted and reviewed. ALLERGIES     is allergic to aspirin. FAMILY HISTORY     He indicated that the status of his mother is unknown. He indicated that the status of his father is unknown.     family history includes No Known Problems in his father and mother. The patient denies any pertinent family history. I have reviewed and agree with the family history entered. I have reviewed the Family History and it is not significant to the case    SOCIAL HISTORY      reports that he has quit smoking. He has never used smokeless tobacco. He reports that he does not drink alcohol and does not use drugs. I have reviewed and agree with all Social.  There are no concerns for substance abuse/use. PHYSICAL EXAM     INITIAL VITALS:  weight is 179 lb (81.2 kg). His oral temperature is 97.2 °F (36.2 °C). His blood pressure is 154/83 (abnormal) and his pulse is 63. His respiration is 18 and oxygen saturation is 98%.       CONSTITUTIONAL: AOx4, no apparent distress, appears stated age   HEAD: normocephalic, atraumatic   EYES: PERRLA, EOMI    ENT: moist mucous membranes, uvula midline   NECK: supple, symmetric   BACK: symmetric   LUNGS: clear to auscultation bilaterally   CARDIOVASCULAR: regular rate and rhythm, no murmurs, rubs or gallops   ABDOMEN: soft, non-tender, non-distended with normal active bowel sounds   NEUROLOGIC:  MAEx4, baseline parkinsonian tremor in bilateral upper extremities   MUSCULOSKELETAL: no clubbing, cyanosis or edema   SKIN: no rash or wounds       DIFFERENTIAL DIAGNOSIS/MDM:     Emergent: ACS/NSTEMI/STEMI/angina, arrhythmia, trauma, aortic dissection,  PE, PNA, pneumothroax, esophageal rupture, tamponade, Cocaine use  Nonemergent: pneumonia, pericarditis, GERD, MSK, Endocarditis, anxiety     Evaluate for: diaphoresis, present chest pain, tachypnea, BP both arms, heart sounds, JVD, tender chest wall, wheezing  GERD, PUD, pancreatitis, cholecystitis, GB colic, cholangitis, SBO, DKA, AAA, mesenteric ischemia, perforated viscous, acute gastroenteritis, NSAP, pyelonephritis, kidney stone, appendicitis, hernia, D-TICS, UTI, constipation, testicular torsion, epididymitis/ orchitis    Medication noncompliance    DIAGNOSTIC RESULTS     EKG: All EKG's are interpreted by the Observation Physician who either signs or Co-signs this chart in the absence of a cardiologist.    RADIOLOGY:   I directly visualized the following  images and reviewed the radiologist interpretations:    XR CHEST (2 VW)    Result Date: 3/2/2023  EXAMINATION: TWO XRAY VIEWS OF THE CHEST 3/2/2023 12:58 pm COMPARISON: 02/05/2023. HISTORY: ORDERING SYSTEM PROVIDED HISTORY: Chest pain TECHNOLOGIST PROVIDED HISTORY: Chest pain FINDINGS: The lungs and costophrenic angles are clear. The cardiomediastinal silhouette and pulmonary vessels appear normal.     No radiographic evidence of an acute cardiopulmonary process. LABS:  I have reviewed and interpreted all available lab results.   Labs Reviewed   TROPONIN - Abnormal; Notable for the following components:       Result Value    Troponin, High Sensitivity 30 (*)     All other components within normal limits   COMPREHENSIVE METABOLIC PANEL - Abnormal; Notable for the following components:    Glucose 131 (*)     Creatinine 1.35 (*)     Est, Glom Filt Rate 52 (*)     Sodium 133 (*)     All other components within normal limits   CBC WITH AUTO DIFFERENTIAL - Abnormal; Notable for the following components:    Seg Neutrophils 68 (*)     Lymphocytes 20 (*)     All other components within normal limits   TROPONIN - Abnormal; Notable for the following components:    Troponin, High Sensitivity 28 (*)     All other components within normal limits   COVID-19, RAPID   LIPASE   SPECIMEN REJECTION   PREVIOUS SPECIMEN         CDU IMPRESSION / PLAN      Jameel Alexis is a 79 y.o. male who presents with epigastric and chest pain    Chest pain / epigastric pain:  Cardiology consulted. Cardiology not performing any interventions or tests. Patient should continue his medications as prescribed. Neurology consulted. Based on neurology evaluation they are stating that this is likely due to medication noncompliance  No change at this time for patient's neurological medication. GI consulted. No interventions or test planned by GI service at this time. Patient should continue his medications as prescribed  Home care/needs status:  Nursing spoke with [facility] care worker. Patient has been referred for PT, OT and skilled nursing. So far, patient has not received PT/OT but has been seen by skilled nursing. Patient is scheduled to receive skilled nursing once a week for the next 7 weeks  [place] has not reported concerns for patient's ability to care for self or home conditions. Patient lives with son who works during the day. Case management working on options for long-term health facility placement. This in the setting of multiple return to the hospital following SNF discharge as an outpatient. Continue home medications and pain control  Monitor vitals, labs, and imaging  DISPO: pending consults and clinical improvement; anticipate discharge either today or tomorrow pending clearance for long-term care facility.     CONSULTS:    IP CONSULT TO CARDIOLOGY  IP CONSULT TO NEUROLOGY  IP CONSULT TO GI    PROCEDURES:  Not indicated PATIENT REFERRED TO:    No follow-up provider specified. --  Annabelle Rust,    Emergency Medicine Attending    This dictation was generated by voice recognition computer software. Although all attempts are made to edit the dictation for accuracy, there may be errors in the transcription that are not intended.

## 2023-03-03 NOTE — PROCEDURES
INSTRUMENTAL SWALLOW REPORT  MODIFIED BARIUM SWALLOW    NAME: Jesus Matthews   : 1940  MRN: [id number]       Date of Eval: 3/3/2023              Referring Diagnosis(es):      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 ([facility] CKD (chronic kidney disease) stage 2, GFR 60-89 ml/min, CKD (chronic kidney disease) stage 3, GFR 30-59 ml/min (HCC), Diverticulosis of colon, GERD (gastroesophageal reflux disease), History of non-ST elevation myocardial infarction (NSTEMI) 2022, Impaired renal function, MRSA (methicillin resistant staph aureus) culture positive, Osteoarthritis of knee, Parkinson disease ([facility] [facility] Proteinuria, Skull fracture ([facility] [facility] Temporary loss of eyesight, and Tubular adenoma of colon. Past Surgical History:  has a past surgical history that includes Colonoscopy (2012); shoulder surgery (Right); pr colsc flx w/rmvl of tumor polyp lesion snare tq (N/A, 2017); Colonoscopy (2017); Cholecystectomy, laparoscopic (N/A, 10/29/2022); Esophagogastroduodenoscopy (2023); and Upper gastrointestinal endoscopy (N/A, 2023). Type of Study: Initial MBS         Patient Complaints/Reason for Referral:  Jesus Matthews was referred for a MBS to assess the efficiency of his/her swallow function, assess for aspiration, and to make recommendations regarding safe dietary consistencies, effective compensatory strategies, and safe eating environment. Onset of problem:       Jesus Matthews is a 79 y.o. male with a history of CKD, GERD, esophagitis, CAD, and Parkinson's disease who presents via EMS with epigastric pain and chest pain. Patient states his chronic epigastric and chest pain became worse this morning (3/2) around 2 AM.  Pain does not radiate, rates the pain a 6 out of 10 in severity and states it is sharp. Patient does state he has been taking his home medications for esophagitis.      Patient has had multiple admissions and discharges to SNF from [facility] discharge from SNF he quickly comes back to the hospital.  In discussion with case management about pursuing a long-term care facility for patient rather than living at family's home. Behavior/Cognition/Vision/Hearing:  Behavior/Cognition: Alert; Cooperative  Vision: Impaired    Impressions:  Patient presents with  safe swallow for Easy to Chew  diet with thin liquids as evidenced by no overt s/s of aspiration noted with consistencies tested. Recommend small sips and bites, only feed when alert and awake and upright at 90 degrees for all PO intake. Recommend close monitoring for overt/clinical s/s of aspiration and D/C PO intake and complete Modified Barium Swallow Study should they occur. Results and recommendations reported to RN. Patient Position: Lateral       Consistencies Administered: Easy to Chew;Pureed;Mildly Thick straw; Thin straw        Dysphagia Outcome Severity Scale: Level 5: Mild dysphagia- Distant supervision. May need one diet consistency restricted  Penetration-Aspiration Scale (PAS): 1 - Material does not enter the airway    Recommended Diet:  Solid consistency: Easy to Chew  Liquid consistency: Thin  Liquid administration via: Cup;Straw  3-5X/week  Medication administration: PO    Safe Swallow Protocol:  Supervision: Close  Compensatory Swallowing Strategies : Alternate solids and liquids;Small bites/sips;Upright as possible for all oral intake;Eat/Feed slowly      Recommendations/Treatment  Requires SLP Intervention: Yes        D/C Recommendations: Ongoing speech therapy is recommended at next level of care; Ongoing speech therapy is recommended during this hospitalization     Recommended Exercises:    Therapeutic Interventions: Diet tolerance monitoring    Education: Images and recommendations were reviewed with pt following this exam.   Patient Education: yes  Patient Education Response: Madalyn understanding      Goals:    Long Term:    To Maximize safety with intake, optimize nutrition/hydration and minimize risk for aspiration. Short Term:    Dysphagia Goals: The patient will tolerate recommended diet without observed clinical signs of aspiration      Oral Preparation / Oral Phase: Impaired     Pt. With extended oral manipulation noted. + tongue pumping noted with swallow initiation.   + oral residual      Pharyngeal Phase: WFL     Puree: No penetration no aspiration no stasis  Soft Solids: No penetration no aspiration no stasis  Thick straw: Spill to vallec no penetration no aspiration mild stasis  Thin Straw:Spill to vallec no penetration no aspiration mild stasis    Esophageal Phase  Esophageal Screen: WFL    + cervical osteophytes noted C345, did not impede swallow function    Pain      Pain Level: 6  Pain Type: Acute pain  Pain Location: Head      Therapy Time:   Individual Concurrent Group Co-treatment   Time In  1435         Time Out  1445         Minutes  10                   SERGEY Hernandez, 3/3/2023, 3:01 PM

## 2023-03-03 NOTE — ED NOTES
Report given to Lakhwinder BERNARD on 3C. All questions answered. Patient updated on plan of care.       Linda Blanco RN  03/02/23 8054

## 2023-03-03 NOTE — CONSULTS
[facility]    Consult / H&P               Today's Date: 3/3/2023  Patient Name: Rhett Juarez  Date of admission: 3/2/2023  1:02 PM  Patient's age: 79 y.o., 1940  Admission Dx: Abdominal pain, epigastric [R10.13]  Chest pain [R07.9]  Dysphagia, unspecified type [R13.10]  Chest pain, unspecified type [R07.9]    Reason for Consult: Chest pain-rule out ACS    Requesting Physician: Mariposa Jernigan MD    CHIEF COMPLAINT: Chest pain and epigastric pain    History Obtained From:  patient, EMR    HISTORY OF PRESENT ILLNESS:      The patient is a 79 y.o. male with past medical history of single-vessel CAD with patent stent in LAD on cath 6/22, hypertension, type 2 diabetes, CKD, GERD, severe esophagitis, Parkinson's disease who presents to the hospital with burning epigastric pain and chest pain. patient states he has chronic epigastric and chest pain for 5 years which become worse for 2 days. Pain is a retrosternal, burning, does not radiate. Patient had 3-4 episodes of vomiting, no blood in vomit or stool. he has been taking his medications for esophagitis. He has some chronic difficulty with swallowing as well. Denies any shortness of breath, palpitations, lightheadedness, leg pain or swelling. In the ER, blood pressure-154/83, heart rate 63, EKG-normal sinus rhythm, otherwise unremarkable, 2 sets of troponin 28-30, chest x-ray unremarkable, CBC and BMP unremarkable.         Past Medical History:   has a past medical history of 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 ([facility] [address] CKD (chronic kidney disease) stage 2, GFR 60-89 ml/min, CKD (chronic kidney disease) stage 3, GFR 30-59 ml/min ([facility]), Diverticulosis of colon, GERD (gastroesophageal reflux disease), History of non-ST elevation myocardial infarction (NSTEMI) 6/2022, Impaired renal function, MRSA (methicillin resistant staph aureus) culture positive, Osteoarthritis of knee, Parkinson disease ([address] Proteinuria, Skull fracture [facility]), Temporary loss of eyesight, and Tubular adenoma of colon. Past Surgical History:   has a past surgical history that includes Colonoscopy (11/02/2012); shoulder surgery (Right); pr colsc flx w/rmvl of tumor polyp lesion snare tq (N/A, 09/19/2017); Colonoscopy (09/19/2017); Cholecystectomy, laparoscopic (N/A, 10/29/2022); Esophagogastroduodenoscopy (01/09/2023); and Upper gastrointestinal endoscopy (N/A, 1/9/2023). Home Medications:    Prior to Admission medications    Medication Sig Start Date End Date Taking?  Authorizing Provider   entacapone (COMTAN) 200 MG tablet TAKE 1 TABLET BY MOUTH 4 TIMES DAILY 2/28/23   JW Greer CNP   allopurinol (ZYLOPRIM) 100 MG tablet TAKE 1 TABLET BY MOUTH ONCE DAILY 2/28/23   JW Greer CNP   clopidogrel (PLAVIX) 75 MG tablet TAKE 1 TABLET BY MOUTH ONCE DAILY 2/28/23   JW Greer CNP   pantoprazole (PROTONIX) 40 MG tablet Take 1 tablet by mouth 2 times daily (before meals) 2/7/23 3/9/23  Toni Mejias MD   sucralfate (CARAFATE) 1 GM tablet Take 1 tablet by mouth 4 times daily 2/7/23 3/9/23  Toni Mejias MD   tamsulosin [facility]) 0.4 MG capsule Take 1 capsule by mouth daily 10/31/22   Aguilar Rushing DO   gabapentin (NEURONTIN) 100 MG capsule TAKE 1 CAPSULE BY MOUTH 3 TIMES DAILY FOR NERVES 9/22/22 2/5/23  JW Greer CNP   pantoprazole (PROTONIX) 40 MG tablet TAKE 1 TABLET BY MOUTH ONCE DAILY FOR GERD 8/23/22   JW Greer CNP   isosorbide mononitrate (IMDUR) 30 MG extended release tablet TAKE 1 TABLET BY MOUTH ONCE DAILY FOR HYPERTENSION 8/23/22   JW Greer CNP   metoprolol tartrate (LOPRESSOR) 25 MG tablet TAKE ONE HALF (1/2) ATBLETS = 12.5MG BY MOUTH TWICE A DAY FOR HYPERTENSION 8/23/22   JW Greer CNP   carbidopa-levodopa (SINEMET)  MG per tablet TAKE ONE (1) TABLET BY MOUTH FOUR (4) TIMES DAILY 8/9/22   JW Greer CNP   rOPINIRole (REQUIP) 0.25 MG tablet TAKE ONE (1) TABLET BY MOUTH THREE (3) TIMES DAILY 8/8/22   JW Samano CNP   atorvastatin (LIPITOR) 40 MG tablet take 1 tablet by mouth once daily 8/4/22   JW Samano CNP   nitroGLYCERIN (NITROSTAT) 0.4 MG SL tablet up to max of 3 total doses. If no relief after 1 dose, call 911. 6/9/22   Na WylieijeomaJW NP   magnesium oxide (MAG-OX) 400 (240 Mg) MG tablet TAKE 1 TABLET BY MOUTH ONCE DAILY 5/23/22   JW Samano CNP   diclofenac sodium (VOLTAREN) 1 % GEL Apply 2 g topically 2 times daily as needed for Pain (joint pain) 5/9/22   Arthurine MiryamcoJW NP   amitriptyline (ELAVIL) 10 MG tablet Take 1 tablet by mouth nightly 4/5/22   Wayne Cabral MD   acetaminophen (TYLENOL 8 HOUR ARTHRITIS PAIN) 650 MG extended release tablet Take 1 tablet by mouth every 8 hours as needed for Pain 6/14/21   JW Samano CNP   butalbital-acetaminophen-caffeine (FIORICET, ESGIC) -40 MG per tablet Take 1 tab prn only for severe headache. Can repeat after 4 hrs if needed. Max 2 tabs/24 hrs. Max 4 tabs/week 6/14/21   JW Samano CNP       Allergies:  Aspirin    Social History:   reports that he has quit smoking. He has never used smokeless tobacco. He reports that he does not drink alcohol and does not use drugs. Family History: family history includes No Known Problems in his father and mother. No h/o sudden cardiac death. REVIEW OF SYSTEMS:    Constitutional: there has been no unanticipated weight loss. There's been No change in energy level, No change in activity level. Eyes: No visual changes or diplopia. No scleral icterus. ENT: No Headaches  Cardiovascular: as above  Respiratory: No previous pulmonary problems, No cough  Gastrointestinal: No abdominal pain. No change in bowel or bladder habits. Genitourinary: No dysuria, trouble voiding, or hematuria. Musculoskeletal:  No gait disturbance, No weakness or joint complaints. Integumentary: No rash or pruritis.   Neurological: No headache, diplopia, change in muscle strength, numbness or tingling. No change in gait, balance, coordination, mood, affect, memory, mentation, behavior. Psychiatric: No anxiety, or depression. Endocrine: No temperature intolerance. No excessive thirst, fluid intake, or urination. No tremor. Hematologic/Lymphatic: No abnormal bruising or bleeding, blood clots or swollen lymph nodes. Allergic/Immunologic: No nasal congestion or hives. PHYSICAL EXAM:      BP (!) 154/83   Pulse 63   Temp 97.2 °F (36.2 °C) (Oral)   Resp 18   Wt 179 lb (81.2 kg)   SpO2 98%   BMI 28.04 kg/m²    Constitutional and General Appearance: alert, cooperative, no distress and appears stated age  HEENT: PERRL, no cervical lymphadenopathy. No masses palpable. Normal oral mucosa  Respiratory:  Normal excursion and expansion without use of accessory muscles  Resp Auscultation: Good respiratory effort. No for increased work of breathing. On auscultation: clear to auscultation bilaterally  Cardiovascular: The apical impulse is not displaced  Heart tones are crisp and normal. regular S1 and S2.  Jugular venous pulsation Normal  The carotid upstroke is normal in amplitude and contour without delay or bruit  Peripheral pulses are symmetrical and full   Abdomen:   No masses or tenderness  Bowel sounds present  Extremities:   No Cyanosis or Clubbing   Lower extremity edema: No   Skin: Warm and dry  Neurological:  Alert and oriented. Moves all extremities well  No abnormalities of mood, affect, memory, mentation, or behavior are noted      Labs:     CBC:   Recent Labs     03/02/23  1407   WBC 6.7   HGB 13.5   HCT 41.2        BMP:   Recent Labs     03/02/23  1321   *   K 4.6   CO2 20   BUN 13   CREATININE 1.35*   LABGLOM 52*   GLUCOSE 131*     BNP: No results for input(s): BNP in the last 72 hours. PT/INR: No results for input(s): PROTIME, INR in the last 72 hours. APTT:No results for input(s): APTT in the last 72 hours.   CARDIAC ENZYMES:No results for input(s): CKTOTAL, CKMB, CKMBINDEX, TROPONINI in the last 72 hours. FASTING LIPID PANEL:  Lab Results   Component Value Date/Time    HDL 34 06/18/2022 06:01 AM    LDLCALC 58 01/08/2018 12:00 AM    TRIG 97 06/18/2022 06:01 AM     LIVER PROFILE:  Recent Labs     03/02/23  1321   AST 31   ALT 23   LABALBU 4.4         Patient Active Problem List   Diagnosis    Temporary loss of eyesight    Syncope : posterior circulation stroke vs Neurocardiogenic syncope     Intracranial bleed : traumatic left sub-dural hematoma ,managed conservatively in 2011    Headache    CKD (chronic kidney disease) stage 3, GFR 30-59 ml/min ([facility])    Depression    Hyperlipidemia    Microhematuria    Microalbuminuria    Essential hypertension    Generalized abdominal pain    Tubular adenoma of colon    Diverticulosis of colon    History of skull fracture    Nausea    Pure hypercholesterolemia    Prediabetes    Parkinson disease ([facility])    Chronic post-traumatic headache, not intractable    Fall (on) (from) other stairs and steps, initial encounter    Strain of iliopsoas muscle, initial encounter    Chronic pain of left knee    Closed fracture of second toe of right foot    Closed fracture of second toe of left foot    Abnormal ECG    Cerebrovascular accident ([address]    Chest pain, unspecified    Hematoma of scalp    Left ventricular hypertrophy    Mild aortic valve regurgitation    Pulmonary hypertension, mild ([facility])    Lumbar radiculopathy    Dizziness    Hyponatremia    Vomiting    General weakness    Overweight (BMI 25.0-29. 9)    Frequent falls    Symptomatic bradycardia    Unspecified inflammatory spondylopathy, 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[address]    Stage 3b chronic kidney disease (CKD) (HCC)    Chronic pain of multiple joints    Multiple comorbid conditions    Gout    Nerve pain    NSTEMI (non-ST elevated myocardial infarction) ([address]    History of subdural hematoma    Coronary artery disease involving native heart with angina pectoris ([address] Lumbar facet arthropathy    Spinal stenosis of lumbar region without neurogenic claudication    Generalized weakness    Lumbosacral spondylosis without myelopathy    Gallstone pancreatitis    Calculus of gallbladder with acute cholecystitis without obstruction    Bandemia    Pulmonary nodule    MRSA carrier    Acute cholecystitis    TAMARA (acute kidney injury) ([address]    History of non-ST elevation myocardial infarction (NSTEMI) 6/2022    Postoperative retention of urine    History of coronary artery stent placement    Nausea & vomiting    Intractable nausea and vomiting    Epigastric pain    Hyperemesis    Esophagitis    Chest pain         DATA:    EKG 3/3/2023  Sinus bradycardia  Low voltage QRS  Incomplete right bundle branch block  Nonspecific ST and T wave abnormality  Abnormal ECG  When compared with ECG of 02-MAR-2023 13:05,  Nonspecific T wave abnormality now evident in Lateral leads    Echo-9/2/2021    Summary  Left ventricle is normal in size. Mild left ventricular hypertrophy. Global left ventricular systolic function is normal. Estimated LV EF 60-65  %. Grade I (mild) left ventricular diastolic dysfunction. Mild aortic insufficiency. Mild mitral regurgitation. Mild tricuspid regurgitation. Mild pulmonary hypertension. Estimated right ventricular systolic pressure  is 95NTTD. Cardiac cath 6/8/2022     Cardiac Arteries and Lesion Findings     LMCA: Normal 0% stenosis. LAD: Patent stent in ostial-proximal area with 10-20% in stent restenosis  Mid to distal vessel is small with diffuse 40% stenosis, somewhat improved  after IC nitro. LCx: Mild irregularities 10-20%. RCA: Mild irregularities 10-20%. IMPRESSION:    Atypical chest pain- likely GI in origin. Chronic mild HS trop elevation  History of CAD with stenting proximal LAD in May 2018.   With patent stent in June 2022-on Plavix, Lipitor 40 mg, Imdur 30 mg, Lopressor 25 mg  GERD-EGD-1/9/2023-severe grade D reflux esophagitis with esophageal ulcerations, takes Protonix 40 mg daily, sucralfate  Hypertension  History of type 2 diabetes-currently not on any medications-last HbA1c June 2022-6.4  CKD stage II with baseline creatinine 1.3-1.4  Parkinson's disease    RECOMMENDATIONS:  Chest pain appears to be related to esophagitis-recommend GI evaluation. Continue Plavix 75 mg, Lipitor 40 mg, Imdur 30 mg, Lopressor 25 mg    Will discuss with rounding attending for final recommendations. Patricia Howard MD  Cardiology Service  Internal Medicine Residency Program, PGY-1  [facility]      Attending Physician Statement:    I have discussed the care of  Ambar Oh , including pertinent history and exam findings, with the Cardiology fellow/resident. I have seen and examined the patient and the key elements of all parts of the encounter have been performed by me. I agree with the assessment, plan and orders as documented by the fellow/resident, after I modified exam findings and plan of treatments, and the final version is my approved version of the assessment. Additional Comments: Likely GI related. Has EGD planned for today. Add norvasc 5mg po QHS.     Reginald Herman MD

## 2023-03-03 NOTE — CARE COORDINATION
Case Management Assessment  Initial Evaluation    Date/Time of Evaluation: 3/3/2023 10:27 AM  Assessment Completed by: LINDSAY Stiles    If patient is discharged prior to next notation, then this note serves as note for discharge by case management. Patient Name: Nhung Baker                   YOB: 1940  Diagnosis: Abdominal pain, epigastric [R10.13]  Chest pain [R07.9]  Dysphagia, unspecified type [R13.10]  Chest pain, unspecified type [R07.9]                   Date / Time: 3/2/2023  1:02 PM    Patient Admission Status: Observation   Readmission Risk (Low < 19, Mod (19-27), High > 27): Readmission Risk Score: 25.1    Current PCP: Prentice Gottron, APRN - CNP  PCP verified by CM? Yes    Chart Reviewed: Yes      History Provided by: Patient  Patient Orientation: Alert and Oriented    Patient Cognition: Alert    Hospitalization in the last 30 days (Readmission):  Yes    If yes, Readmission Assessment in CM Navigator will be completed. Advance Directives:      Code Status: Full Code   Patient's Primary Decision Maker is: Legal Next of Kin    Primary Decision MakerEbalwinder Guevara - [phone]    Secondary Decision Maker: Dominguez Pinedo - [phone]    Discharge Planning:    Patient lives with:   Type of Home: alone  Primary Care Giver: Self  Patient Support Systems include: [facility]  Current Financial resources: Medicare  Current community resources: [facility]  Current services prior to admission: [facility]            Current DME:  cane, walker, wheelchair            Type of Home Care services:       ADLS    Current functional level: Assistance with the following:, Bathing, Dressing, Toileting, Cooking, Housework, Shopping, Mobility    PT AM-PAC:   /24  OT AM-PAC:   /24    Family can provide assistance at DC: No  Would you like Case Management to discuss the discharge plan with any other family members/significant others, and if so, who?  Yes (Can discuss with children)  Plans to Return to Present Housing: Unknown, SNF vs return home with [facility]  Other Identified Issues/Barriers to RETURNING to current housing: Awaiting call from [facility] to see if pt is able to return to facility. Due to insurance pt was discharged from [facility] on 2/25  Potential Assistance needed at discharge: Timo Hightower            Potential DME:    Patient expects to discharge to: Skilled nursing facility vs home with [address] for transportation at discharge:  family    Financial    Payor: Cherie Goel / Plan: Cherie Goel / Product Type: *No Product type* /     Does insurance require precert for SNF: Yes    Potential assistance Purchasing Medications: No  Meds-to-Beds request:        [address], [place] - [address] [place] [address]  Phone: [phone] Fax: [address] [facility] [address] [facility], [address] [place] [address] [address] [facility] Shameka Hancock [address] [facility]  [address] [facility] [address]  Phone: [phone] Fax: [phone]    [address], [facility] [address]. - P [phone] - F [phone]  [address] [address]  Phone: [phone] Fax: [phone]      Notes:    Factors facilitating achievement of predicted outcomes: Cooperative and Pleasant    Barriers to discharge: No secondary insurance    Additional Case Management Notes: Pt was residing at [facility] until he was discharged to home 2/25 due to no medicare days left for SNF. Appeal was made by SNF but denied. Pt would like to return to [facility] for further rehab if possible. Awaiting return call from SNF regarding pt returning at discharge. [facility] was arranged when discharged from [facility] for home PT/OT and visiting nurse.       The Plan for Transition of Care is related to the following treatment goals of Abdominal pain, epigastric [R10.13]  Chest pain [R07.9]  Dysphagia, unspecified type [R13.10]  Chest pain, unspecified type [M28.0]    IF APPLICABLE: The Patient and/or patient representative Mina and his family were provided with a choice of provider and agrees with the discharge plan. Freedom of choice list with basic dialogue that supports the patient's individualized plan of care/goals and shares the quality data associated with the providers was provided to: Patient   Patient Representative Name:       The Patient and/or Patient Representative Agree with the Discharge Plan?  Yes    Maldonado Koehler, [place]  Case Management Department  Ph: [phone] Fax:

## 2023-03-03 NOTE — PLAN OF CARE
Problem: Pain  Goal: Verbalizes/displays adequate comfort level or baseline comfort level  3/3/2023 1054 by Raman Davis RN  Outcome: Progressing  3/3/2023 0121 by Helen Beaver RN  Outcome: Progressing     Problem: Safety - Adult  Goal: Free from fall injury  3/3/2023 1054 by Raman Davis RN  Outcome: Progressing  3/3/2023 0121 by Helen Beaver RN  Outcome: Progressing

## 2023-03-04 PROBLEM — R62.7 FAILURE TO THRIVE IN ADULT: Status: ACTIVE | Noted: 2023-03-04

## 2023-03-04 PROCEDURE — 2580000003 HC RX 258

## 2023-03-04 PROCEDURE — 97116 GAIT TRAINING THERAPY: CPT

## 2023-03-04 PROCEDURE — C9113 INJ PANTOPRAZOLE SODIUM, VIA: HCPCS

## 2023-03-04 PROCEDURE — 99232 SBSQ HOSP IP/OBS MODERATE 35: CPT | Performed by: PSYCHIATRY & NEUROLOGY

## 2023-03-04 PROCEDURE — 97162 PT EVAL MOD COMPLEX 30 MIN: CPT

## 2023-03-04 PROCEDURE — 6370000000 HC RX 637 (ALT 250 FOR IP)

## 2023-03-04 PROCEDURE — 1200000000 HC SEMI PRIVATE

## 2023-03-04 PROCEDURE — 6360000002 HC RX W HCPCS

## 2023-03-04 PROCEDURE — 6370000000 HC RX 637 (ALT 250 FOR IP): Performed by: STUDENT IN AN ORGANIZED HEALTH CARE EDUCATION/TRAINING PROGRAM

## 2023-03-04 RX ADMIN — GABAPENTIN 100 MG: 100 CAPSULE ORAL at 08:16

## 2023-03-04 RX ADMIN — CARBIDOPA AND LEVODOPA 1 TABLET: 25; 100 TABLET ORAL at 12:37

## 2023-03-04 RX ADMIN — DESMOPRESSIN ACETATE 40 MG: 0.2 TABLET ORAL at 08:16

## 2023-03-04 RX ADMIN — GABAPENTIN 100 MG: 100 CAPSULE ORAL at 14:46

## 2023-03-04 RX ADMIN — SODIUM CHLORIDE, PRESERVATIVE FREE 40 MG: 5 INJECTION INTRAVENOUS at 08:16

## 2023-03-04 RX ADMIN — GABAPENTIN 100 MG: 100 CAPSULE ORAL at 20:17

## 2023-03-04 RX ADMIN — ENTACAPONE 200 MG: 200 TABLET, FILM COATED ORAL at 20:17

## 2023-03-04 RX ADMIN — CLOPIDOGREL 75 MG: 75 TABLET, FILM COATED ORAL at 08:36

## 2023-03-04 RX ADMIN — SUCRALFATE 1 G: 1 TABLET ORAL at 12:37

## 2023-03-04 RX ADMIN — AMITRIPTYLINE HYDROCHLORIDE 10 MG: 10 TABLET, FILM COATED ORAL at 20:17

## 2023-03-04 RX ADMIN — ISOSORBIDE MONONITRATE 30 MG: 30 TABLET, EXTENDED RELEASE ORAL at 08:16

## 2023-03-04 RX ADMIN — ENTACAPONE 200 MG: 200 TABLET, FILM COATED ORAL at 17:31

## 2023-03-04 RX ADMIN — ENTACAPONE 200 MG: 200 TABLET, FILM COATED ORAL at 08:16

## 2023-03-04 RX ADMIN — CARBIDOPA AND LEVODOPA 1 TABLET: 25; 100 TABLET ORAL at 17:31

## 2023-03-04 RX ADMIN — TAMSULOSIN HYDROCHLORIDE 0.4 MG: 0.4 CAPSULE ORAL at 08:15

## 2023-03-04 RX ADMIN — AMLODIPINE BESYLATE 5 MG: 5 TABLET ORAL at 08:16

## 2023-03-04 RX ADMIN — SUCRALFATE 1 G: 1 TABLET ORAL at 20:16

## 2023-03-04 RX ADMIN — SODIUM CHLORIDE, PRESERVATIVE FREE 10 ML: 5 INJECTION INTRAVENOUS at 20:22

## 2023-03-04 RX ADMIN — CARBIDOPA AND LEVODOPA 1 TABLET: 25; 100 TABLET ORAL at 20:17

## 2023-03-04 RX ADMIN — ACETAMINOPHEN 650 MG: 325 TABLET ORAL at 22:45

## 2023-03-04 RX ADMIN — ROPINIROLE HYDROCHLORIDE 0.5 MG: 0.25 TABLET, FILM COATED ORAL at 20:16

## 2023-03-04 RX ADMIN — CARBIDOPA AND LEVODOPA 1 TABLET: 25; 100 TABLET ORAL at 08:16

## 2023-03-04 RX ADMIN — SODIUM CHLORIDE, PRESERVATIVE FREE 10 ML: 5 INJECTION INTRAVENOUS at 08:37

## 2023-03-04 RX ADMIN — METOPROLOL TARTRATE 12.5 MG: 25 TABLET ORAL at 20:17

## 2023-03-04 RX ADMIN — ROPINIROLE HYDROCHLORIDE 0.5 MG: 0.25 TABLET, FILM COATED ORAL at 08:15

## 2023-03-04 RX ADMIN — SODIUM CHLORIDE, PRESERVATIVE FREE 40 MG: 5 INJECTION INTRAVENOUS at 20:16

## 2023-03-04 RX ADMIN — ENTACAPONE 200 MG: 200 TABLET, FILM COATED ORAL at 12:37

## 2023-03-04 RX ADMIN — ROPINIROLE HYDROCHLORIDE 0.5 MG: 0.25 TABLET, FILM COATED ORAL at 14:47

## 2023-03-04 RX ADMIN — ACETAMINOPHEN 650 MG: 325 TABLET ORAL at 04:23

## 2023-03-04 RX ADMIN — SUCRALFATE 1 G: 1 TABLET ORAL at 08:15

## 2023-03-04 ASSESSMENT — PAIN DESCRIPTION - ORIENTATION
ORIENTATION: RIGHT
ORIENTATION: LEFT

## 2023-03-04 ASSESSMENT — PAIN DESCRIPTION - LOCATION
LOCATION: HEAD
LOCATION: ABDOMEN

## 2023-03-04 ASSESSMENT — PAIN SCALES - GENERAL
PAINLEVEL_OUTOF10: 5
PAINLEVEL_OUTOF10: 0
PAINLEVEL_OUTOF10: 3

## 2023-03-04 ASSESSMENT — ENCOUNTER SYMPTOMS
TROUBLE SWALLOWING: 0
SHORTNESS OF BREATH: 0
VOMITING: 0
NAUSEA: 0

## 2023-03-04 ASSESSMENT — PAIN DESCRIPTION - PAIN TYPE: TYPE: ACUTE PAIN

## 2023-03-04 ASSESSMENT — PAIN DESCRIPTION - DESCRIPTORS
DESCRIPTORS: DISCOMFORT
DESCRIPTORS: ACHING

## 2023-03-04 NOTE — ACP (ADVANCE CARE PLANNING)
Advance Care Planning     Advance Care Planning Activator (Inpatient)  Conversation Note      Date of ACP Conversation: 3/4/2023     Conversation Conducted with: Patient with Decision Making Capacity    ACP Activator: Jarrod Singh RN    Pt makes own decisions  Full Code Status    Health Care Decision Maker:     Current Designated Health Care Decision Maker:     Primary Decision MakerCandida Standard - Child - [phone]    Secondary Decision Maker: Nelida Holt - [phone]  Click here to complete Healthcare Decision Makers including section of the Healthcare Decision Maker Relationship (ie \"Primary\")  Today we documented Decision Maker(s) consistent with Legal Next of Kin hierarchy. Care Preferences    Ventilation: \"If you were in your present state of health and suddenly became very ill and were unable to breathe on your own, what would your preference be about the use of a ventilator (breathing machine) if it were available to you? \"      Would the patient desire the use of ventilator (breathing machine)?: yes    \"If your health worsens and it becomes clear that your chance of recovery is unlikely, what would your preference be about the use of a ventilator (breathing machine) if it were available to you? \"     Would the patient desire the use of ventilator (breathing machine)?: Yes      Resuscitation  \"CPR works best to restart the heart when there is a sudden event, like a heart attack, in someone who is otherwise healthy. Unfortunately, CPR does not typically restart the heart for people who have serious health conditions or who are very sick. \"    \"In the event your heart stopped as a result of an underlying serious health condition, would you want attempts to be made to restart your heart (answer \"yes\" for attempt to resuscitate) or would you prefer a natural death (answer \"no\" for do not attempt to resuscitate)? \" yes       [x] Yes   [] No   Educated Patient / Decision Maker regarding differences between Advance Directives and portable DNR orders.     Length of ACP Conversation in minutes:      Conversation Outcomes:  ACP discussion completed    Follow-up plan:    [x] Schedule follow-up conversation to continue planning  [] Referred individual to Provider for additional questions/concerns   [] Advised patient/agent/surrogate to review completed ACP document and update if needed with changes in condition, patient preferences or care setting    [] This note routed to one or more involved healthcare providers

## 2023-03-04 NOTE — PROGRESS NOTES
[facility]   [address] [place] [address]    Progress Note             Date:   3/4/2023  Patient name:  Archie Shea  Date of admission:  3/2/2023  1:02 PM  MRN:   [id number]  Account:  [de-identified]  YOB: 1940  PCP:    JW Sims CNP  Room:   [address] [place] [address]  Code Status:    Full Code    Chief Complaint:     Chief Complaint   Patient presents with    Chest Pain       Interval hx: The patient was seen and examined at bedside. Is vitally stable, alert and oriented x 3. No acute events overnight   Continues to have abdominal pain   Intermittently able to swallow  Poor oral intake due ab pain and Nausea     Brief History of Present Illness: The patient is a 79 y.o.  /  male who presents with Chest Pain   and he is admitted to the hospital for the management of  epigastric pain this is an 79-year-old  male with past medical history of head trauma March 2011 after falling down from a ladder along with subdural hematoma treated conservatively, GERD, HTN, CKD, osteoarthritis of knee, esophagitis, CAD, Parkinson's disease on cement  mg 4 times daily, entacapone 200 mg 4 times daily, presented to ED on 3/2 with complaint of epigastric and chest pain    ECG NSR, QTc 420, troponin 30, chest x-ray was unremarkable, was admitted under observation unit for chest pain, neurology were consulted for Parkinson's disease and increased difficulty swallowing. Patient follows with our service was last seen 7/22. Patient on exam is awake alert oriented to 3, no cranial nerve deficits, no motor or sensory neurological deficits, rigidity in the upper and lower extremity, pill-rolling tremor bilateral hands more pronounced on the right side, decreased hand tapping speed in the right hand as compared to the left. Plan was to increase Requip 2.5 mg 3 times daily.         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 ([facility] [address] 3/18/2011    d/t being hurt on the job    CKD (chronic kidney disease) stage 2, GFR 60-89 ml/min     CKD (chronic kidney disease) stage 3, GFR 30-59 ml/min ([facility])     Diverticulosis of colon     GERD (gastroesophageal reflux disease)     History of non-ST elevation myocardial infarction (NSTEMI) 6/2022 10/27/2022    Impaired renal function     MRSA (methicillin resistant staph aureus) culture positive 9/23/2015    leg    Osteoarthritis of knee     Left    Parkinson disease ([facility] [address]     Proteinuria     Skull fracture ([facility] [address] 3/18/2011    d/t 12-foot drop on the job    Temporary loss of eyesight     periodically, happened x7    Tubular adenoma of colon 2012, 2017    mulitple        Past Surgical History:     Past Surgical History:   Procedure Laterality Date    CHOLECYSTECTOMY, LAPAROSCOPIC N/A 10/29/2022    LAPROSCOPIC CHOLECYSTECTOMY performed by Jeison Mccarty DO at [address] [place] [address]  11/02/2012    poor prep, tubular adenoma    COLONOSCOPY  09/19/2017    diverticulosis, multiple polyps as described, spot marking done, pathology-tubular adenoma x 4    ESOPHAGOGASTRODUODENOSCOPY  01/09/2023    [place] COLSC FLX W/RMVL OF TUMOR POLYP LESION SNARE TQ N/A 09/19/2017    COLONOSCOPY POLYPECTOMY SNARE/COLD BIOPSY with tatooing and apc transverse colon performed by Corina Kuhn MD at [address] [facility] Right     rotator cuff    UPPER GASTROINTESTINAL ENDOSCOPY N/A 1/9/2023    EGD ESOPHAGOGASTRODUODENOSCOPY performed by Lauren Cevallos MD at [place] [address]        Medications Prior to Admission:     Prior to Admission medications    Medication Sig Start Date End Date Taking?  Authorizing Provider   entacapone (COMTAN) 200 MG tablet TAKE 1 TABLET BY MOUTH 4 TIMES DAILY 2/28/23   JW Adair CNP   allopurinol (ZYLOPRIM) 100 MG tablet TAKE 1 TABLET BY MOUTH ONCE DAILY 2/28/23   JW Adair CNP   clopidogrel (PLAVIX) 75 MG tablet TAKE 1 TABLET BY MOUTH ONCE DAILY 2/28/23   Carlee Angelo, APRN - CNP   pantoprazole (PROTONIX) 40 MG tablet Take 1 tablet by mouth 2 times daily (before meals) 2/7/23 3/9/23  Swapna Freire MD   sucralfate (CARAFATE) 1 GM tablet Take 1 tablet by mouth 4 times daily 2/7/23 3/9/23  Swapna Freire MD   tamsulosin [facility]) 0.4 MG capsule Take 1 capsule by mouth daily 10/31/22   Guerrero Otero DO   gabapentin (NEURONTIN) 100 MG capsule TAKE 1 CAPSULE BY MOUTH 3 TIMES DAILY FOR NERVES 9/22/22 2/5/23  Carlee Angelo, APRN - CNP   pantoprazole (PROTONIX) 40 MG tablet TAKE 1 TABLET BY MOUTH ONCE DAILY FOR GERD 8/23/22   Carlee Angelo, APRN - CNP   isosorbide mononitrate (IMDUR) 30 MG extended release tablet TAKE 1 TABLET BY MOUTH ONCE DAILY FOR HYPERTENSION 8/23/22   Carlee Angelo, APRN - CNP   metoprolol tartrate (LOPRESSOR) 25 MG tablet TAKE ONE HALF (1/2) ATBLETS = 12.5MG BY MOUTH TWICE A DAY FOR HYPERTENSION 8/23/22   Carlee Angelo, APRN - CNP   carbidopa-levodopa (SINEMET)  MG per tablet TAKE ONE (1) TABLET BY MOUTH FOUR (4) TIMES DAILY 8/9/22   Carlee Angelo, APRN - CNP   rOPINIRole (REQUIP) 0.25 MG tablet TAKE ONE (1) TABLET BY MOUTH THREE (3) TIMES DAILY 8/8/22   Carlee Angelo, APRN - CNP   atorvastatin (LIPITOR) 40 MG tablet take 1 tablet by mouth once daily 8/4/22   Carlee Angelo, APRN - CNP   nitroGLYCERIN (NITROSTAT) 0.4 MG SL tablet up to max of 3 total doses.  If no relief after 1 dose, call 911. 6/9/22   Manuel Wills, APRN - NP   magnesium oxide (MAG-OX) 400 (240 Mg) MG tablet TAKE 1 TABLET BY MOUTH ONCE DAILY 5/23/22   Carlee Angelo, APRJOVON - CNP   diclofenac sodium (VOLTAREN) 1 % GEL Apply 2 g topically 2 times daily as needed for Pain (joint pain) 5/9/22   Eusebio Malone, JW - NP   amitriptyline (ELAVIL) 10 MG tablet Take 1 tablet by mouth nightly 4/5/22   Linus Paz MD   acetaminophen (TYLENOL 8 HOUR ARTHRITIS PAIN) 650 MG extended release tablet Take 1 tablet by mouth every 8 hours as needed for Pain 6/14/21   Carlee Stephens, APRN - CNP butalbital-acetaminophen-caffeine (FIORICET, ESGIC) -40 MG per tablet Take 1 tab prn only for severe headache. Can repeat after 4 hrs if needed. Max 2 tabs/24 hrs. Max 4 tabs/week 21   JW Brandon - CNP        Allergies:     Aspirin    Social History:     Tobacco:    reports that he has quit smoking. He has never used smokeless tobacco.  Alcohol:      reports no history of alcohol use. Drug Use:  reports no history of drug use. Family History:     Family History   Problem Relation Age of Onset    No Known Problems Mother     No Known Problems Father        Review of Systems:     Review of Systems   Constitutional:  Positive for fatigue. HENT:  Positive for drooling. Negative for trouble swallowing. Respiratory:  Negative for shortness of breath. Cardiovascular:  Positive for chest pain. Gastrointestinal:  Negative for nausea and vomiting. Genitourinary:  Negative for dysuria and urgency. Neurological:  Positive for weakness. Negative for tremors, facial asymmetry, light-headedness and numbness. Psychiatric/Behavioral:  Positive for sleep disturbance. Negative for behavioral problems and decreased concentration. Physical Exam:   /73   Pulse 53   Temp 97.5 °F (36.4 °C) (Oral)   Resp 18   Wt 179 lb (81.2 kg)   SpO2 96%   BMI 28.04 kg/m²   Temp (24hrs), Av.5 °F (36.4 °C), Min:97.5 °F (36.4 °C), Max:97.5 °F (36.4 °C)    No results for input(s): POCGLU in the last 72 hours.   No intake or output data in the 24 hours ending 23 0910      Neurologic Exam     GENERAL  Appears comfortable and in no distress   HEENT  NC/ AT   HEART  S1 and S2 heard; palpation of pulses: radial pulse    NECK  Supple and no bruits heard   MENTAL STATUS:  Alert, oriented, intact memory, no confusion, normal speech, normal language, no hallucination or delusion   CRANIAL NERVES: II     -      Visual fields intact to confrontation  III,IV,VI -  PERR, EOMs full, no ptosis  V     - Normal facial sensation   VII    -     Normal facial symmetry  VIII   -     Intact hearing   IX,X -     Symmetrical palate  XI    -     Symmetrical shoulder shrug  XII   -     Midline tongue, no atrophy    MOTOR FUNCTION: RUE: Significant for good strength of grade 5/5 in proximal and distal muscle groups   LUE: Significant for good strength of grade 5/5 in proximal and distal muscle groups   RLE: Significant for good strength of grade 5/5 in proximal and distal muscle groups   LLE: Significant for good strength of grade 5/5 in proximal and distal muscle groups      Normal bulk, normal tone and no involuntary movements, no tremor   SENSORY FUNCTION:  Normal touch, normal pinprick, normal vibration, normal proprioception   CEREBELLAR FUNCTION: Resting pill-rolling tremor noticed bilaterally more pronounced on right side, decreased hand tapping speed in the right hand as compared to left   REFLEX FUNCTION:  Symmetric in upper and lower extremities, no Babinski sign   STATION and GAIT Not examined       Investigations:      Laboratory Testing:  No results found for this or any previous visit (from the past 24 hour(s)).   Recent Labs     03/02/23  1407   WBC 6.7   RBC 4.25   HGB 13.5   HCT 41.2   MCV 96.9   MCH 31.8   MCHC 32.8   RDW 13.7      MPV 8.6     Recent Labs     03/02/23  1321   *   K 4.6      CO2 20   BUN 13   CREATININE 1.35*   GLUCOSE 131*   CALCIUM 9.4   PROT 7.2   LABALBU 4.4   BILITOT 0.6   ALKPHOS 123   AST 31   ALT 23     Hemoglobin A1C   Date Value Ref Range Status   06/08/2022 6.4 (H) 4.0 - 6.0 % Final       Assessment :      Primary Problem  Chest pain    Active Hospital Problems    Diagnosis Date Noted    Dysphagia [R13.10] 03/03/2023     Priority: Medium    Chest pain [R07.9] 03/02/2023     Priority: Medium       Patient is a 79 y.o. male history of head trauma March 2011, subdural hematoma conservatively treated, HTN, CKD, osteoarthritis of knee, esophagitis, CAD, Parkinson disease on Sinemet  mg 4 times daily, entacapone 200 mg 4 times daily, presented with epigastric pain and chest pain, was admitted to obs unit for evaluation, neurology were consulted for Parkinson's disease management of difficulty swallowing  Impression: Esophagitis, Parkinson's disease, difficulty swallowing, postconcussion headache    Plan:     Continue with Sinemet  mg 4 times daily. Continue entacapone 200 mg 4 times dailyplease take 1 wake up at 7:30 AM, at noon and at 4 PM and at bedtime    Requip increased from 0.25 mg 2.5 mg 3 times daily    Likely has esophagitis due to multiple pills, will likely benefit from deep brain stimulation that might decrease oral pills and allow GI system to recover     No change in management    Will likely sign off             Follow-up further recommendations after discussing the case with attending  The plan was discussed with the patient, patient's family and the medical staff. Consultations:   IP CONSULT TO CARDIOLOGY  IP CONSULT TO NEUROLOGY  IP CONSULT TO GI    Patient is admitted as inpatient status because of co-morbidities listed above, severity of signs and symptoms as outlined, requirement for current medical therapies and most importantly because of direct risk to patient if care not provided in a hospital setting.     Yeny Cantor MD  Neurology Resident PGY-2  3/4/2023  9:10 AM    Copy sent to JW Ramos - CNP

## 2023-03-04 NOTE — PROGRESS NOTES
[address]  CDU / OBSERVATION ENCOUNTER  ATTENDING NOTE       I performed a history and physical examination of the patient and discussed management with the resident or midlevel provider. I reviewed the resident or midlevel provider's note and agree with the documented findings and plan of care. Any areas of disagreement are noted on the chart. I was personally present for the key portions of any procedures. I have documented in the chart those procedures where I was not present during the key portions. I have reviewed the nurses notes. I agree with the chief complaint, past medical history, past surgical history, allergies, medications, social and family history as documented unless otherwise noted below. The Family history, social history, and ROS are effectively unchanged since admission unless noted elsewhere in the chart. This patient was placed in the observation unit for reevaluation for possible admission to the hospital    Patient looks well today. In fact as well as I have ever seen him and I have known him for some time. Patient states he is feeling good and anxious to proceed with plan outside hospital.  Still with concern over his ability to care for self at home. Appreciate PT OT evaluations and case management. Patient eating and drinking well today. We will continue supportive therapy. Will await decisions regarding appropriate disposition.     Daniel Han MD  Attending Emergency  Physician

## 2023-03-04 NOTE — PROGRESS NOTES
Physical Therapy  Facility/Department: [address] OBSERVATION  Physical Therapy Initial Assessment    Name: Verena Nguyen  : 1940  MRN: [id number]  Date of Service: 3/4/2023   Verena Nguyen is a 79 y.o. male with a history of CKD, GERD, esophagitis, CAD, and Parkinson's disease who presents via EMS with epigastric pain and chest pain. Patient states his chronic epigastric and chest pain became worse this morning (3/2) around 2 AM.  Pain does not radiate, rates the pain a 6 out of 10 in severity and states it is sharp. Patient does state he has been taking his home medications for esophagitis. Patient has had multiple admissions and discharges to SNF from [facility].  Following discharge from SNF he quickly comes back to the hospital  Discharge Recommendations:  Therapy recommended at discharge, Patient would benefit from continued therapy after discharge          Patient Diagnosis(es): The primary encounter diagnosis was Chest pain, unspecified type. Diagnoses of Abdominal pain, epigastric and Dysphagia, unspecified type were also pertinent to this visit. 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 Past Surgical History:  has a past surgical history that includes Colonoscopy (2012); shoulder surgery (Right); pr colsc flx w/rmvl of tumor polyp lesion snare tq (N/A, 2017); Colonoscopy (2017);  Cholecystectomy, laparoscopic (N/A, 10/29/2022); Esophagogastroduodenoscopy (01/09/2023); and Upper gastrointestinal endoscopy (N/A, 1/9/2023). Assessment   Assessment: Pt admitted with chest pain/epigastric pain. Pt with history of parkinson's, and head injury. Pt demonstrates slow processing, slow mobility, B UE tremors and high fall risk. Pt with multiple admsiisions to the hospital in last few months. Pt requires assistance for all mobility at this time, Pt lives alone. Will benefit from continued physical therapy while inacute care phase as well as post discharge. Treatment Diagnosis: Impaired fucntion. Therapy Prognosis: Fair  Decision Making: Medium Complexity  History: The patient is a 79 y.o.  /  male who presents with Chest Pain   and he is admitted to the hospital for the management of  epigastric pain this is an 65-year-old  male with past medical history of head trauma March 2011 after falling down from a ladder along with subdural hematoma treated conservatively, GERD, HTN, CKD, osteoarthritis of knee, esophagitis, CAD, Parkinson's disease on sinemet  mg 4 times daily, entacapone 200 mg 4 times daily, presented to ED on 3/2 with complaint of epigastric and chest pain  Barriers to Learning: Slow processing  Requires PT Follow-Up: Yes  Activity Tolerance  Activity Tolerance: Patient limited by fatigue;Patient limited by endurance; Other (comment) (Epigastic pain/nausea)     Plan   Physcial Therapy Plan  General Plan: 5-7 times per week  Current Treatment Recommendations: Strengthening, Balance training, Functional mobility training, Transfer training, Endurance training, Gait training, Safety education & training, Patient/Caregiver education & training, Positioning, Equipment evaluation, education, & procurement, Therapeutic activities  Safety Devices  Type of Devices: Gait belt, Call light within reach, Bed alarm in place, Left in bed     Restrictions  Restrictions/Precautions  Restrictions/Precautions: Fall Risk, Contact Precautions  Position Activity Restriction  Other position/activity restrictions: Hx of parkinson's     Subjective   General  Patient assessed for rehabilitation services?: Yes  Additional Pertinent Hx: Juliana Alexander is a 79 y.o. male with a history of CKD, GERD, esophagitis, CAD, and Parkinson's disease who presents via EMS with epigastric pain and chest pain. Patient states his chronic epigastric and chest pain became worse this morning (3/2) around 2 AM.  Pain does not radiate, rates the pain a 6 out of 10 in severity and states it is sharp. Patient does state he has been taking his home medications for esophagitis. Patient has had multiple admissions and discharges to SNF from [facility] [address] discharge from SNF he quickly comes back to the hospital  Referral Date : 03/03/23  Diagnosis: Chest pain. Social/Functional History  Social/Functional History  Lives With: Alone  Type of Home: House  Home Layout: Two level, Able to Live on Main level with bedroom/bathroom  Home Access: Stairs to enter with rails  Entrance Stairs - Number of Steps: 5  Entrance Stairs - Rails: Both  Bathroom Shower/Tub: Walk-in shower  Bathroom Toilet: Standard  Bathroom Equipment: Shower chair, Hand-held shower  Bathroom Accessibility: Accessible  Home Equipment: Phyliss Man, rolling, Hospital bed  Has the patient had two or more falls in the past year or any fall with injury in the past year?: Yes  Receives Help From: Friend(s)  ADL Assistance: Needs assistance  Bath: Moderate assistance  Dressing: Moderate assistance  Grooming: Moderate assistance  Feeding: Moderate assistance  Toileting: Needs assistance  Homemaking Assistance:  ((Pt reports neighbors assist with cleaning, cooking and laundry tasks))  Meal Prep: Total  Laundry: Total  Vacuuming: Total  Cleaning: Total  Gardening: Total  Yard Work: Total  Driving: Total  Shopping:  Total  Homemaking Responsibilities: No  Ambulation Assistance: Independent (St cane)  Transfer Assistance: Independent  Active : Yes (pt reports he can drive, has not driven last few weeks)  Mode of Transportation:  (medical cab)  Occupation: Retired  Additional Comments: Pt was recently at [employer]. Pt reports neighbors are able to provide prn assist upon discharge. Vision/Hearing  Vision  Vision: Impaired  Vision Exceptions: Wears glasses at all times  Hearing  Hearing: Within functional limits    Cognition   Orientation  Overall Orientation Status: Within Functional Limits  Cognition  Overall Cognitive Status: Exceptions  Arousal/Alertness: Delayed responses to stimuli  Following Commands: Follows one step commands with increased time; Follows one step commands with repetition  Attention Span: Attends with cues to redirect  Memory: Decreased recall of recent events;Decreased short term memory  Safety Judgement: Decreased awareness of need for assistance;Decreased awareness of need for safety  Problem Solving: Assistance required to identify errors made;Assistance required to implement solutions  Insights: Decreased awareness of deficits  Initiation: Requires cues for some  Sequencing: Requires cues for some     Objective   Heart Rate: 53  Heart Rate Source: Monitor  BP: 125/73  BP Method: Automatic  MAP (Calculated): 90  Resp: 18  SpO2: 96 %     Observation/Palpation  Posture: Fair  Observation: Resting pill-rolling tremor noticed bilaterally more pronounced on right side, decreased hand tapping speed in the right hand as compared to left        AROM RLE (degrees)  RLE AROM: WFL  AROM LLE (degrees)  LLE AROM : WFL  Strength RLE  Comment: 4-/5  Strength LLE  Comment: 4-/5  Coordination  Rapid Alternating Movements: Dysdiadokinesia  Sensation  Overall Sensation Status:  (B hand numbness)     Bed mobility  Rolling to Left: Stand by assistance  Rolling to Right: Stand by assistance (withuse of bed rail.)  Supine to Sit: Contact guard assistance  Sit to Supine: Contact guard assistance  Transfers  Sit to Stand: Contact guard assistance  Stand to Sit: Contact guard assistance  Bed to Chair: Minimal assistance (with st cane)  Comment: Pt initially reports dizzyness. pt has been eating much due to epigastric pain/nausea. Ambulation  Surface: Level tile  Device: Single point cane  Assistance: Minimal assistance  Quality of Gait: unstaedy gait with St cane  Gait Deviations: Increased SHABBIR  Distance: 10 ft  More Ambulation?: Yes  Ambulation 2  Surface - 2: level tile  Device 2: Rolling Walker  Assistance 2: Contact guard assistance  Quality of Gait 2: Improved stability with rolling walekr, but pt is at fall risk at this time, not safe to ambulate o his own. Gait Deviations: Decreased step length; Slow Ewa; Increased SHABBIR; Decreased step height  Distance: 15 ft     Balance  Posture: Fair  Sitting - Static: Good  Sitting - Dynamic: Fair  Standing - Static: Fair;+ (RW)  Standing - Dynamic: Fair (RW)           OutComes Score                                                  AM-PAC Score  AM-PAC Inpatient Mobility Raw Score : 17 (03/04/23 1306)  AM-PAC Inpatient T-Scale Score : 42.13 (03/04/23 1306)  Mobility Inpatient CMS 0-100% Score: 50.57 (03/04/23 1306)  Mobility Inpatient CMS G-Code Modifier : CK (03/04/23 1306)          Tinneti Score       Goals  Short Term Goals  Time Frame for Short Term Goals: 5 to 7 visits  Short Term Goal 1: Pt to demonstrate transers with rolling walker at SBA  Short Term Goal 2: Pt able to ambualte with rolling walker distance of 50 ft x 2, SBA  Short Term Goal 3: Pt to demonstrate mod-I bed mobility with bed rail. Short Term Goal 4: Pt to demonstrate improve standing balance to fair during fucntional mobilityand reaching/bending task, to reduce fall risk. Education  Patient Education  Education Given To: Patient  Education Provided: Role of Therapy;Plan of Care;Precautions;Transfer Training; Fall Prevention Strategies  Education Method: Demonstration;Verbal;Teach Back  Barriers to Learning: Cognition  Education Outcome: Verbalized understanding;Demonstrated understanding;Continued education needed      Therapy Time   Individual Concurrent Group Co-treatment   Time In 1024         Time Out 1050         Minutes 26         Timed Code Treatment Minutes: 10 Minutes       Cristina Steiner, PT

## 2023-03-04 NOTE — PROGRESS NOTES
OBS/CDU   RESIDENT NOTE      Patients PCP is:  JW Herrera - FELIPE        SUBJECTIVE      No acute events overnight. Patient feeling much better this morning. Patient said he was feeling well until yesterday evening when he all of a sudden felt the need to throw up. Said he vomited a couple times and felt like it was not going to stop so he called 911. Has been able to tolerate a full diet without nausea or vomiting. The patient is urinating on his own and is passing flatus. Last BM was yesterday. Denies fever, chills, nausea, vomiting, chest pain, shortness of breath, abdominal pain, focal weakness, numbness, tingling, urinary/bowel symptoms, vision changes, visual hallucinations, or headache. `    PHYSICAL EXAM      General: NAD, AO X 3  Heent: EMOI, PERRL  Neck: SUPPLE, NO JVD  Cardiovascular: RRR, S1S2  Pulmonary: CTAB, NO SOB  Abdomen: SOFT, NTTP, ND, +BS  Extremities: +2/4 PULSES DISTAL, NO SWELLING  Neuro / Psych: NO NUMBNESS OR TINGLING, MENTATION AT BASELINE    PERTINENT TEST /EXAMS      I have reviewed all available laboratory results.     MEDICATIONS CURRENT   pantoprazole (PROTONIX) 40 mg in sodium chloride (PF) 0.9 % 10 mL injection, Daily  amLODIPine (NORVASC) tablet 5 mg, Daily  rOPINIRole (REQUIP) tablet 0.5 mg, TID  sodium chloride flush 0.9 % injection 5-40 mL, 2 times per day  sodium chloride flush 0.9 % injection 5-40 mL, PRN  0.9 % sodium chloride infusion, PRN  enoxaparin (LOVENOX) injection 40 mg, Daily  ondansetron (ZOFRAN-ODT) disintegrating tablet 4 mg, Q8H PRN   Or  ondansetron (ZOFRAN) injection 4 mg, Q6H PRN  polyethylene glycol (GLYCOLAX) packet 17 g, Daily PRN  acetaminophen (TYLENOL) tablet 650 mg, Q6H PRN   Or  acetaminophen (TYLENOL) suppository 650 mg, Q6H PRN  amitriptyline (ELAVIL) tablet 10 mg, Nightly  atorvastatin (LIPITOR) tablet 40 mg, Daily  carbidopa-levodopa (SINEMET)  MG per tablet 1 tablet, 4x Daily  clopidogrel (PLAVIX) tablet 75 mg, Daily  entacapone (COMTAN) tablet 200 mg, 4x Daily  gabapentin (NEURONTIN) capsule 100 mg, TID  isosorbide mononitrate (IMDUR) extended release tablet 30 mg, Daily  metoprolol tartrate (LOPRESSOR) tablet 12.5 mg, BID  sucralfate (CARAFATE) tablet 1 g, 4x Daily  tamsulosin (FLOMAX) capsule 0.4 mg, Daily        All medication charted and reviewed. CONSULTS      IP CONSULT TO CARDIOLOGY  IP CONSULT TO NEUROLOGY  IP CONSULT TO GI    ASSESSMENT/PLAN       Volodymyr Murray is a 79 y.o. male who presents with epigastric and chest pain     Chest pain / epigastric pain:  Cardiology consulted. Atypical chest pain - non cardiac in nature -Continue Plavix 75 mg, Lipitor 40 mg, Imdur 30 mg, Lopressor 25 mg and norvasc   No further cardiac work up needed , cardiology is signing off   Neurology consulted. Optimized Requip dose to 0.5 tid; tolerating it well so far; to continue same along with Sinemet IR 25/100 qid & Comtan 200 mg qid. For esophagitis; GI on board; to continue PPI. Neurology is signing off. GI consulted. No indications at this time for repeat endoscopy is most likely patient simply needs a longer course of treatment  Continue Protonix 40 mg twice daily 30 minutes before meals  Soft diet. Supplements 2-3 times daily until patient is able to swallow more food. Appreciate input and recommendations from neurology  Patient will need to follow-up in the GI clinic in 1 month with Dr. Carmen Groves. No further recommendations at this time GI will sign off  Dysphagia:  Modified barium swallow: safe swallow for Easy to [employer] with thin liquids as evidenced by no overt s/s of aspiration noted with consistencies tested  small sips and bites, only feed when alert and awake and upright at 90 degrees for all PO intake. Solid consistency: Easy to Chew  Liquid consistency:  Thin  Liquid administration via: Cup;Straw  Requires SLP Intervention: Yes  D/C Recommendations: Ongoing speech therapy is recommended at next level of care;Ongoing speech therapy is recommended during this hospitalization  Recommended Exercises:    Therapeutic Interventions: Diet tolerance monitoring  Patient ordering easy to chew food off regular menu, will continue to encourage adequate intake  Home care/needs status:  Nursing spoke with [facility] care worker.  Patient has been referred for PT, OT and skilled nursing.  So far, patient has not received PT/OT but has been seen by skilled nursing.  Patient is scheduled to receive skilled nursing once a week for the next 7 weeks  [place] has not reported concerns for patient's ability to care for self or home conditions.  Patient lives with son who works during the day.  Case management working on options for long-term health facility placement.  This in the setting of multiple return to the hospital following SNF discharge as an outpatient.  Continue home medications and pain control  Monitor vitals, labs, and imaging  DISPO: pending clinical improvement    --  Delvin Hanson MD  Emergency Medicine Resident Physician     This dictation was generated by voice recognition computer software.  Although all attempts are made to edit the dictation for accuracy, there may be errors in the transcription that are not intended.

## 2023-03-05 ENCOUNTER — APPOINTMENT (OUTPATIENT)
Dept: CT IMAGING | Age: 83
DRG: 392 | End: 2023-03-05
Payer: MEDICARE

## 2023-03-05 PROCEDURE — 6370000000 HC RX 637 (ALT 250 FOR IP)

## 2023-03-05 PROCEDURE — 1200000000 HC SEMI PRIVATE

## 2023-03-05 PROCEDURE — 6360000002 HC RX W HCPCS

## 2023-03-05 PROCEDURE — 6370000000 HC RX 637 (ALT 250 FOR IP): Performed by: STUDENT IN AN ORGANIZED HEALTH CARE EDUCATION/TRAINING PROGRAM

## 2023-03-05 PROCEDURE — C9113 INJ PANTOPRAZOLE SODIUM, VIA: HCPCS

## 2023-03-05 PROCEDURE — 2580000003 HC RX 258

## 2023-03-05 PROCEDURE — 74176 CT ABD & PELVIS W/O CONTRAST: CPT

## 2023-03-05 RX ORDER — MAGNESIUM HYDROXIDE/ALUMINUM HYDROXICE/SIMETHICONE 120; 1200; 1200 MG/30ML; MG/30ML; MG/30ML
30 SUSPENSION ORAL ONCE
Status: COMPLETED | OUTPATIENT
Start: 2023-03-05 | End: 2023-03-05

## 2023-03-05 RX ADMIN — CARBIDOPA AND LEVODOPA 1 TABLET: 25; 100 TABLET ORAL at 09:53

## 2023-03-05 RX ADMIN — GABAPENTIN 100 MG: 100 CAPSULE ORAL at 20:53

## 2023-03-05 RX ADMIN — METOPROLOL TARTRATE 12.5 MG: 25 TABLET ORAL at 20:54

## 2023-03-05 RX ADMIN — TAMSULOSIN HYDROCHLORIDE 0.4 MG: 0.4 CAPSULE ORAL at 09:52

## 2023-03-05 RX ADMIN — GABAPENTIN 100 MG: 100 CAPSULE ORAL at 14:47

## 2023-03-05 RX ADMIN — SODIUM CHLORIDE, PRESERVATIVE FREE 10 ML: 5 INJECTION INTRAVENOUS at 09:56

## 2023-03-05 RX ADMIN — SUCRALFATE 1 G: 1 TABLET ORAL at 09:53

## 2023-03-05 RX ADMIN — SODIUM CHLORIDE, PRESERVATIVE FREE 40 MG: 5 INJECTION INTRAVENOUS at 09:55

## 2023-03-05 RX ADMIN — ALUMINUM HYDROXIDE, MAGNESIUM HYDROXIDE, AND SIMETHICONE 30 ML: 200; 200; 20 SUSPENSION ORAL at 14:50

## 2023-03-05 RX ADMIN — AMLODIPINE BESYLATE 5 MG: 5 TABLET ORAL at 09:52

## 2023-03-05 RX ADMIN — AMITRIPTYLINE HYDROCHLORIDE 10 MG: 10 TABLET, FILM COATED ORAL at 20:53

## 2023-03-05 RX ADMIN — ENTACAPONE 200 MG: 200 TABLET, FILM COATED ORAL at 09:54

## 2023-03-05 RX ADMIN — ROPINIROLE HYDROCHLORIDE 0.5 MG: 0.25 TABLET, FILM COATED ORAL at 20:53

## 2023-03-05 RX ADMIN — SODIUM CHLORIDE, PRESERVATIVE FREE 40 MG: 5 INJECTION INTRAVENOUS at 20:55

## 2023-03-05 RX ADMIN — DESMOPRESSIN ACETATE 40 MG: 0.2 TABLET ORAL at 09:54

## 2023-03-05 RX ADMIN — SUCRALFATE 1 G: 1 TABLET ORAL at 20:53

## 2023-03-05 RX ADMIN — ONDANSETRON 4 MG: 4 TABLET, ORALLY DISINTEGRATING ORAL at 09:51

## 2023-03-05 RX ADMIN — GABAPENTIN 100 MG: 100 CAPSULE ORAL at 09:47

## 2023-03-05 RX ADMIN — ROPINIROLE HYDROCHLORIDE 0.5 MG: 0.25 TABLET, FILM COATED ORAL at 09:54

## 2023-03-05 RX ADMIN — CLOPIDOGREL 75 MG: 75 TABLET, FILM COATED ORAL at 09:52

## 2023-03-05 RX ADMIN — SUCRALFATE 1 G: 1 TABLET ORAL at 14:48

## 2023-03-05 RX ADMIN — ISOSORBIDE MONONITRATE 30 MG: 30 TABLET, EXTENDED RELEASE ORAL at 09:56

## 2023-03-05 RX ADMIN — CARBIDOPA AND LEVODOPA 1 TABLET: 25; 100 TABLET ORAL at 20:53

## 2023-03-05 RX ADMIN — ENTACAPONE 200 MG: 200 TABLET, FILM COATED ORAL at 14:48

## 2023-03-05 RX ADMIN — ROPINIROLE HYDROCHLORIDE 0.5 MG: 0.25 TABLET, FILM COATED ORAL at 14:47

## 2023-03-05 RX ADMIN — ENTACAPONE 200 MG: 200 TABLET, FILM COATED ORAL at 20:53

## 2023-03-05 RX ADMIN — CARBIDOPA AND LEVODOPA 1 TABLET: 25; 100 TABLET ORAL at 14:47

## 2023-03-05 ASSESSMENT — PAIN SCALES - GENERAL: PAINLEVEL_OUTOF10: 5

## 2023-03-05 NOTE — PLAN OF CARE
Problem: Chronic Conditions and Co-morbidities  Goal: Patient's chronic conditions and co-morbidity symptoms are monitored and maintained or improved  Outcome: Progressing     Problem: Pain  Goal: Verbalizes/displays adequate comfort level or baseline comfort level  Outcome: Progressing     Problem: Safety - Adult  Goal: Free from fall injury  Outcome: Progressing     Problem: Discharge Planning  Goal: Discharge to home or other facility with appropriate resources  Outcome: Progressing

## 2023-03-05 NOTE — PROGRESS NOTES
OBS/CDU   RESIDENT NOTE      Patients PCP is:  Ap Holley, APRN - CNP        SUBJECTIVE      No acute events overnight. Having some burning in his belly. Trying ginger ale and water with some improvement. Ok to try maalox. No vomiting. Would like to go a facility at discharge as he feels he was doing better than he was at home. Has been able to tolerate a full diet without vomiting. The patient is urinating on his own and is passing flatus. Denies fever, chills, nausea, vomiting, chest pain, shortness of breath, focal weakness, numbness, tingling, urinary/bowel symptoms, vision changes, visual hallucinations, or headache. `    PHYSICAL EXAM      General: NAD, AO X 3  Heent: EMOI, PERRL  Neck: SUPPLE, NO JVD  Cardiovascular: RRR, S1S2  Pulmonary: CTAB, NO SOB  Abdomen: SOFT, NTTP, ND, +BS  Extremities: +2/4 PULSES DISTAL, NO SWELLING  Neuro / Psych: NO NUMBNESS OR TINGLING, MENTATION AT BASELINE    PERTINENT TEST /EXAMS      I have reviewed all available laboratory results.     MEDICATIONS CURRENT   pantoprazole (PROTONIX) 40 mg in sodium chloride (PF) 0.9 % 10 mL injection, BID  amLODIPine (NORVASC) tablet 5 mg, Daily  rOPINIRole (REQUIP) tablet 0.5 mg, TID  sodium chloride flush 0.9 % injection 5-40 mL, 2 times per day  sodium chloride flush 0.9 % injection 5-40 mL, PRN  0.9 % sodium chloride infusion, PRN  enoxaparin (LOVENOX) injection 40 mg, Daily  ondansetron (ZOFRAN-ODT) disintegrating tablet 4 mg, Q8H PRN   Or  ondansetron (ZOFRAN) injection 4 mg, Q6H PRN  polyethylene glycol (GLYCOLAX) packet 17 g, Daily PRN  acetaminophen (TYLENOL) tablet 650 mg, Q6H PRN   Or  acetaminophen (TYLENOL) suppository 650 mg, Q6H PRN  amitriptyline (ELAVIL) tablet 10 mg, Nightly  atorvastatin (LIPITOR) tablet 40 mg, Daily  carbidopa-levodopa (SINEMET)  MG per tablet 1 tablet, 4x Daily  clopidogrel (PLAVIX) tablet 75 mg, Daily  entacapone (COMTAN) tablet 200 mg, 4x Daily  gabapentin (NEURONTIN) capsule 100 mg, TID  isosorbide mononitrate (IMDUR) extended release tablet 30 mg, Daily  metoprolol tartrate (LOPRESSOR) tablet 12.5 mg, BID  sucralfate (CARAFATE) tablet 1 g, 4x Daily  tamsulosin (FLOMAX) capsule 0.4 mg, Daily      All medication charted and reviewed. CONSULTS      IP CONSULT TO CARDIOLOGY  IP CONSULT TO NEUROLOGY  IP CONSULT TO GI    ASSESSMENT/PLAN       Gavi Hernandez is a 79 y.o. male who presents with epigastric and chest pain     Chest pain / epigastric pain:  Cardiology consulted. Atypical chest pain - non cardiac in nature -Continue Plavix 75 mg, Lipitor 40 mg, Imdur 30 mg, Lopressor 25 mg and norvasc   No further cardiac work up needed , cardiology is signing off   Neurology consulted. Optimized Requip dose to 0.5 tid; tolerating it well so far; to continue same along with Sinemet IR 25/100 qid & Comtan 200 mg qid. For esophagitis; GI on board; to continue PPI. Neurology is signing off. GI consulted. No indications at this time for repeat endoscopy is most likely patient simply needs a longer course of treatment  Continue Protonix 40 mg twice daily 30 minutes before meals  Soft diet. Supplements 2-3 times daily until patient is able to swallow more food. Appreciate input and recommendations from neurology  Patient will need to follow-up in the GI clinic in 1 month with Dr. Adelaida Lou. No further recommendations at this time GI will sign off  Dysphagia:  Modified barium swallow: safe swallow for Easy to [employer] with thin liquids as evidenced by no overt s/s of aspiration noted with consistencies tested  small sips and bites, only feed when alert and awake and upright at 90 degrees for all PO intake. Solid consistency: Easy to Chew  Liquid consistency: Thin  Liquid administration via: Cup;Straw  Requires SLP Intervention: Yes  D/C Recommendations: Ongoing speech therapy is recommended at next level of care; Ongoing speech therapy is recommended during this hospitalization  Recommended Exercises:    Therapeutic Interventions: Diet tolerance monitoring  Patient ordering easy to chew food off regular menu, will continue to encourage adequate intake  Nausea/Vomiting/Abd pain  Recent CT with small fluid collection adjacent to gallbladder fossa  Recent cholecystectomy  Minimal improvement with zofran and protonix  Will get CT abdomen/pelvis today to evaluate for intra-abdominal pathology  Home care/needs status:  Nursing spoke with [facility] care worker. Patient has been referred for PT, OT and skilled nursing. So far, patient has not received PT/OT but has been seen by skilled nursing. Patient is scheduled to receive skilled nursing once a week for the next 7 weeks  [place] has not reported concerns for patient's ability to care for self or home conditions. Patient lives with son who works during the day. Case management working on options for long-term health facility placement. This in the setting of multiple return to the hospital following SNF discharge as an outpatient. Continue home medications and pain control  Monitor vitals, labs, and imaging  DISPO: pending clinical improvement and placement    --  Serena Moreira MD  Emergency Medicine Resident Physician     This dictation was generated by voice recognition computer software. Although all attempts are made to edit the dictation for accuracy, there may be errors in the transcription that are not intended.

## 2023-03-05 NOTE — PROGRESS NOTES
[address]  CDU / OBSERVATION ENCOUNTER  ATTENDING NOTE       I performed a history and physical examination of the patient and discussed management with the resident or midlevel provider. I reviewed the resident or midlevel provider's note and agree with the documented findings and plan of care. Any areas of disagreement are noted on the chart. I was personally present for the key portions of any procedures. I have documented in the chart those procedures where I was not present during the key portions. I have reviewed the nurses notes. I agree with the chief complaint, past medical history, past surgical history, allergies, medications, social and family history as documented unless otherwise noted below. The Family history, social history, and ROS are effectively unchanged since admission unless noted elsewhere in the chart. This patient was placed in the observation unit for reevaluation for possible admission to the hospital    No new symptoms today. Patient with ongoing medical issues but no acute change. Patient comfortable. Discussed with patient long-term care versus ECF placement and home care.     Herb Jefferson MD  Attending Emergency  Physician

## 2023-03-06 PROCEDURE — 6360000002 HC RX W HCPCS

## 2023-03-06 PROCEDURE — 2580000003 HC RX 258

## 2023-03-06 PROCEDURE — 6370000000 HC RX 637 (ALT 250 FOR IP): Performed by: STUDENT IN AN ORGANIZED HEALTH CARE EDUCATION/TRAINING PROGRAM

## 2023-03-06 PROCEDURE — 97535 SELF CARE MNGMENT TRAINING: CPT

## 2023-03-06 PROCEDURE — 1200000000 HC SEMI PRIVATE

## 2023-03-06 PROCEDURE — 6370000000 HC RX 637 (ALT 250 FOR IP)

## 2023-03-06 PROCEDURE — C9113 INJ PANTOPRAZOLE SODIUM, VIA: HCPCS

## 2023-03-06 PROCEDURE — 97530 THERAPEUTIC ACTIVITIES: CPT

## 2023-03-06 PROCEDURE — 97166 OT EVAL MOD COMPLEX 45 MIN: CPT

## 2023-03-06 RX ADMIN — ROPINIROLE HYDROCHLORIDE 0.5 MG: 0.25 TABLET, FILM COATED ORAL at 14:07

## 2023-03-06 RX ADMIN — ENTACAPONE 200 MG: 200 TABLET, FILM COATED ORAL at 09:40

## 2023-03-06 RX ADMIN — SUCRALFATE 1 G: 1 TABLET ORAL at 16:28

## 2023-03-06 RX ADMIN — TAMSULOSIN HYDROCHLORIDE 0.4 MG: 0.4 CAPSULE ORAL at 09:36

## 2023-03-06 RX ADMIN — CARBIDOPA AND LEVODOPA 1 TABLET: 25; 100 TABLET ORAL at 21:29

## 2023-03-06 RX ADMIN — SUCRALFATE 1 G: 1 TABLET ORAL at 21:29

## 2023-03-06 RX ADMIN — CARBIDOPA AND LEVODOPA 1 TABLET: 25; 100 TABLET ORAL at 01:46

## 2023-03-06 RX ADMIN — CARBIDOPA AND LEVODOPA 1 TABLET: 25; 100 TABLET ORAL at 14:07

## 2023-03-06 RX ADMIN — ENTACAPONE 200 MG: 200 TABLET, FILM COATED ORAL at 21:29

## 2023-03-06 RX ADMIN — ENTACAPONE 200 MG: 200 TABLET, FILM COATED ORAL at 14:07

## 2023-03-06 RX ADMIN — SODIUM CHLORIDE, PRESERVATIVE FREE 10 ML: 5 INJECTION INTRAVENOUS at 21:34

## 2023-03-06 RX ADMIN — GABAPENTIN 100 MG: 100 CAPSULE ORAL at 09:39

## 2023-03-06 RX ADMIN — SUCRALFATE 1 G: 1 TABLET ORAL at 14:07

## 2023-03-06 RX ADMIN — ROPINIROLE HYDROCHLORIDE 0.5 MG: 0.25 TABLET, FILM COATED ORAL at 09:36

## 2023-03-06 RX ADMIN — AMITRIPTYLINE HYDROCHLORIDE 10 MG: 10 TABLET, FILM COATED ORAL at 21:28

## 2023-03-06 RX ADMIN — DESMOPRESSIN ACETATE 40 MG: 0.2 TABLET ORAL at 09:42

## 2023-03-06 RX ADMIN — ENTACAPONE 200 MG: 200 TABLET, FILM COATED ORAL at 01:46

## 2023-03-06 RX ADMIN — ENTACAPONE 200 MG: 200 TABLET, FILM COATED ORAL at 16:28

## 2023-03-06 RX ADMIN — ROPINIROLE HYDROCHLORIDE 0.5 MG: 0.25 TABLET, FILM COATED ORAL at 21:29

## 2023-03-06 RX ADMIN — GABAPENTIN 100 MG: 100 CAPSULE ORAL at 21:29

## 2023-03-06 RX ADMIN — SODIUM CHLORIDE, PRESERVATIVE FREE 40 MG: 5 INJECTION INTRAVENOUS at 21:33

## 2023-03-06 RX ADMIN — ISOSORBIDE MONONITRATE 30 MG: 30 TABLET, EXTENDED RELEASE ORAL at 09:39

## 2023-03-06 RX ADMIN — AMLODIPINE BESYLATE 5 MG: 5 TABLET ORAL at 09:36

## 2023-03-06 RX ADMIN — CARBIDOPA AND LEVODOPA 1 TABLET: 25; 100 TABLET ORAL at 16:28

## 2023-03-06 RX ADMIN — SUCRALFATE 1 G: 1 TABLET ORAL at 01:46

## 2023-03-06 RX ADMIN — CARBIDOPA AND LEVODOPA 1 TABLET: 25; 100 TABLET ORAL at 09:39

## 2023-03-06 RX ADMIN — GABAPENTIN 100 MG: 100 CAPSULE ORAL at 14:07

## 2023-03-06 RX ADMIN — ENOXAPARIN SODIUM 40 MG: 100 INJECTION SUBCUTANEOUS at 09:43

## 2023-03-06 RX ADMIN — CLOPIDOGREL 75 MG: 75 TABLET, FILM COATED ORAL at 09:42

## 2023-03-06 RX ADMIN — SODIUM CHLORIDE, PRESERVATIVE FREE 40 MG: 5 INJECTION INTRAVENOUS at 09:44

## 2023-03-06 RX ADMIN — METOPROLOL TARTRATE 12.5 MG: 25 TABLET ORAL at 21:28

## 2023-03-06 RX ADMIN — SUCRALFATE 1 G: 1 TABLET ORAL at 09:39

## 2023-03-06 RX ADMIN — SODIUM CHLORIDE, PRESERVATIVE FREE 10 ML: 5 INJECTION INTRAVENOUS at 09:58

## 2023-03-06 NOTE — PROGRESS NOTES
Occupational Therapy  Facility/Department: [address] ORTHO/MED SURG  Occupational Therapy Initial Assessment    Name: Mina Gill  : 1940  MRN: [id number]  Date of Service: 3/6/2023    Chief Complaint   Patient presents with    Chest Pain       Discharge Recommendations:  Patient would benefit from continued therapy after discharge  OT Equipment Recommendations  Equipment Needed: Yes  Equipment recommendations listed below are based on what the patient would need if they were able to return to prior living arrangements at the time of discharge.   Mobility Devices: ADL Assistive Devices  ADL Assistive Devices: Transfer Tub Bench;Reacher;Sock-Aid Hard;Long-handled Shoe Horn;Long-handled Sponge       Patient Diagnosis(es): The primary encounter diagnosis was Chest pain, unspecified type. Diagnoses of Abdominal pain, epigastric and Dysphagia, unspecified type were also pertinent to this visit.  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  Past Surgical History:  has a past surgical history that includes Colonoscopy (2012); shoulder surgery (Right); pr colsc flx w/rmvl of tumor polyp lesion snare tq (N/A, 2017); Colonoscopy (2017); Cholecystectomy, laparoscopic (N/A, 10/29/2022); Esophagogastroduodenoscopy (2023); and Upper gastrointestinal endoscopy (N/A, 2023).           Assessment   Performance deficits / Impairments: Decreased functional mobility  ;Decreased ADL status; Decreased strength;Decreased safe awareness;Decreased cognition;Decreased endurance;Decreased balance;Decreased high-level IADLs;Decreased coordination  Assessment: Pt agreed to OT this date. Pt engaged in bed mobility requiring CGA and increased time and effort, while utilizing bed rail for assistance. Pt completed functional transfers/mobility initially with personal str cane however noting significant unsteadiness and noted to reach for external support throughout. Pt then was provided with RW, at which time balance improved slightly. OT facilitated UB dressing task once returned to seated in recliner with CGA and increased time. Pt will benefit from continued OT services to address deficits in strength, cognition, balance, endurance, and coordination through use of skilled therapeutic interventions to increase functional independence and safety with ADLs/IADLs and functional transfers/mobility. Prognosis: Good  Decision Making: Medium Complexity  REQUIRES OT FOLLOW-UP: Yes  Activity Tolerance  Activity Tolerance: Patient Tolerated treatment well        Plan   Occupational Therapy Plan  Times Per Week: 3-5 x/wk  Current Treatment Recommendations: Strengthening, Balance training, Endurance training, Cognitive reorientation, Pain management, Functional mobility training, Safety education & training, Patient/Caregiver education & training, Equipment evaluation, education, & procurement, Self-Care / ADL, Home management training, Coordination training     Restrictions  Restrictions/Precautions  Restrictions/Precautions: Contact Precautions  Required Braces or Orthoses?: No  Position Activity Restriction  Other position/activity restrictions: up with assistance, Hx of parkinson's    Subjective   General  Patient assessed for rehabilitation services?: Yes  Family / Caregiver Present: No  General Comment  Comments: RN ok'd pt for OT this date.  Pt agreeable to session and pleasant/cooperative throughout. Pt reports pain of 5/10, generalized. Pt was assisted with increased activity to aid in management of pain with good return noted. Social/Functional History  Social/Functional History  Lives With: Alone  Type of Home: House  Home Layout: Two level, Able to Live on Main level with bedroom/bathroom  Home Access: Stairs to enter with rails  Entrance Stairs - Number of Steps: 5  Entrance Stairs - Rails: Both  Bathroom Shower/Tub: Tub/Shower unit  Bathroom Toilet: Standard  Bathroom Equipment: Shower chair, Hand-held shower  Home Equipment: Garrett Shank, rolling, Hospital bed (pt reports using str cane for majority of functional mobility)  Receives Help From: Friend(s)  ADL Assistance: Independent  Homemaking Assistance: Needs assistance (pt reports friends complete homemaking tasks)  Homemaking Responsibilities: No  Ambulation Assistance: Independent  Transfer Assistance: Independent  Active : No  Mode of Transportation:  (medical cab)  Occupation: Retired  Additional Comments: Pt was recently at [employer]. Pt reports neighbors are able to provide prn assist upon discharge. Objective   Safety Devices  Type of Devices: Gait belt;Call light within reach; Chair alarm in place; Left in chair;Nurse notified  Restraints  Restraints Initially in Place: No    Bed Mobility Training  Bed Mobility Training: Yes  Overall Level of Assistance: Contact-guard assistance; Additional time; Adaptive equipment (pt engaged in sup > sit with HOB elevated and CGA with increased time and heavy reliance on bed rail)  Rolling: Additional time  Supine to Sit: Contact-guard assistance; Additional time; Adaptive equipment  Sit to Supine:  (pt retired to recliner)  Balance  Sitting: With support (pt engaged in static and dynamic sitting with SBA this date requiring unilateral UE supported on bed throughout majority of seated tasks ~15 min total)  Standing: Impaired (pt initially attempted static and dynamic standing with use of str cane however exhibited significant unsteadiness and required Min A. Pt was then provided with RW and improvement in balance was noted with pt requiring CGA)  Transfer Training  Transfer Training: Yes  Overall Level of Assistance: Contact-guard assistance; Additional time; Adaptive equipment (pt completed transfers from EOB and from recliner with CGA, requiring Min VCs for appropriate hand placement)  Interventions: Safety awareness training;Verbal cues  Sit to Stand: Contact-guard assistance; Additional time; Adaptive equipment  Stand to Sit: Contact-guard assistance; Additional time; Adaptive equipment  Gait  Overall Level of Assistance: Minimum assistance; Additional time; Adaptive equipment (pt initially completed functional mobility with use of str cane however noted pt to reach for furniture and support with contralateral UE and exhibit poor balance. Pt was provided with RW and functional mobility improved to CGA, still noting unsteadiness throughout)    AROM: Within functional limits  Strength: Generally decreased, functional (B shoulder 4-/5, B bicep/tricep and  4/5)  Coordination: Generally decreased, functional (gross tremors to BUE)  Tone: Normal  Sensation: Impaired (numbness/tingling to fingers per pt report)    ADL  Feeding: Modified independent ;Setup  Grooming: Modified independent ;Setup; Increased time to complete  UE Bathing: Contact guard assistance;Setup; Increased time to complete  LE Bathing: Minimal assistance; Increased time to complete;Setup  UE Dressing: Contact guard assistance;Setup; Increased time to complete  UE Dressing Skilled Clinical Factors: pt doffed soiled and donned clean gown with CGA and increased time d/t generalized pain and slowed movements  LE Dressing: Minimal assistance;Setup; Increased time to complete  Toileting: Minimal assistance;Setup; Increased time to complete    Vision  Vision: Impaired  Vision Exceptions: Wears glasses at all times  Hearing  Hearing: Exceptions to [facility]  Hearing Exceptions: Hard of hearing/hearing concerns    Cognition  Overall Cognitive Status: Exceptions  Following Commands: Follows multistep commands with increased time; Follows multistep commands with repitition  Safety Judgement: Decreased awareness of need for assistance;Decreased awareness of need for safety  Problem Solving: Assistance required to correct errors made;Assistance required to identify errors made  Insights: Decreased awareness of deficits  Initiation: Requires cues for some  Sequencing: Requires cues for some                    Education Provided Comments: Pt ed on OT role, OT POC, safety awareness, bed mobility training, transfer training, DME use, adaptive ADL tech, fall prevention, and importance of continued OT.  Good return    LUE AROM (degrees)  LUE AROM : WFL  Left Hand AROM (degrees)  Left Hand AROM: WFL  RUE AROM (degrees)  RUE AROM : WFL  Right Hand AROM (degrees)  Right Hand AROM: WFL       Hand Dominance  Hand Dominance: Right            AM-PAC Score  AM-PAC Inpatient Daily Activity Raw Score: 20 (03/06/23 1151)  AM-PAC Inpatient ADL T-Scale Score : 42.03 (03/06/23 1151)  ADL Inpatient CMS 0-100% Score: 38.32 (03/06/23 1151)  ADL Inpatient CMS G-Code Modifier : CJ (03/06/23 1151)    Goals  Short Term Goals  Time Frame for Short Term Goals: By discharge, pt will:  Short Term Goal 1: Demo SBA for functional transfers and functional mobility with use of LRD for engagement in ADLs/IADLs  Short Term Goal 2: Demo Mod I for UB ADLs with use of adaptive tech/AE PRN  Short Term Goal 3: Demo CGA for LB ADLs and toileting tasks with setup and AE PRN  Short Term Goal 4: Demo 8 min dynamic standing and reaching outside [place] with unilateral hand release and SBA for improved balance during ADL/IADL participation  Short Term Goal 5: Follow 75% of 3-step commands with appropriate initiation and sequencing for improved functional independence with daily occupations       Therapy Time Individual Concurrent Group Co-treatment   Time In 0905         Time Out 0927         Minutes 22         Timed Code Treatment Minutes: 8 Minutes       Loli Swann OTR/L

## 2023-03-06 NOTE — PLAN OF CARE
Problem: Chronic Conditions and Co-morbidities  Goal: Patient's chronic conditions and co-morbidity symptoms are monitored and maintained or improved  Outcome: Progressing     Problem: Pain  Goal: Verbalizes/displays adequate comfort level or baseline comfort level  3/6/2023 1429 by Mark Barker RN  Outcome: Progressing  3/6/2023 0456 by Janessa Roldan RN  Outcome: Progressing     Problem: Safety - Adult  Goal: Free from fall injury  3/6/2023 1429 by Mark Barker RN  Outcome: Progressing  3/6/2023 0456 by Janessa Roldan RN  Outcome: Progressing     Problem: Discharge Planning  Goal: Discharge to home or other facility with appropriate resources  Outcome: Progressing

## 2023-03-06 NOTE — PROGRESS NOTES
Physical Therapy  Facility/Department: [address] ORTHO/MED SURG  Physical Therapy Treatment Note    Name: Jesus Matthews  : 1940  MRN: [id number]  Date of Service: 3/6/2023    Discharge Recommendations:  Therapy recommended at discharge, Patient would benefit from continued therapy after discharge          Patient Diagnosis(es): The primary encounter diagnosis was Chest pain, unspecified type. Diagnoses of Abdominal pain, epigastric and Dysphagia, unspecified type were also pertinent to this visit. 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 ([address] CKD (chronic kidney disease) stage 2, GFR 60-89 ml/min, CKD (chronic kidney disease) stage 3, GFR 30-59 ml/min (HCC), Diverticulosis of colon, GERD (gastroesophageal reflux disease), History of non-ST elevation myocardial infarction (NSTEMI) 2022, Impaired renal function, MRSA (methicillin resistant staph aureus) culture positive, Osteoarthritis of knee, Parkinson disease ([address] Proteinuria, Skull fracture ([facility] [address] Temporary loss of eyesight, and Tubular adenoma of colon. Past Surgical History:  has a past surgical history that includes Colonoscopy (2012); shoulder surgery (Right); pr colsc flx w/rmvl of tumor polyp lesion snare tq (N/A, 2017); Colonoscopy (2017); Cholecystectomy, laparoscopic (N/A, 10/29/2022); Esophagogastroduodenoscopy (2023); and Upper gastrointestinal endoscopy (N/A, 2023). Assessment    The pt ambulated 25 ft with a RW x CGA . He was mildly unsteady and ambulated with a decreased stride length. He cojuld benefit from a continuation of PT for gait and strengthening following his DC  Activity Tolerance  Activity Tolerance: Patient limited by fatigue;Patient limited by endurance; Other (comment)     Plan   Physcial Therapy Plan  General Plan: 5-7 times per week  Current Treatment Recommendations: Strengthening, Balance training, Functional mobility training, Transfer training, Endurance training, Gait training, Safety education & training, Patient/Caregiver education & training, Positioning, Equipment evaluation, education, & procurement, Therapeutic activities  Safety Devices  Type of Devices: Gait belt, Call light within reach, Chair alarm in place, Left in chair, Patient at risk for falls  Restraints  Restraints Initially in Place: No     Restrictions  Restrictions/Precautions  Restrictions/Precautions: Fall Risk, Contact Precautions  Position Activity Restriction  Other position/activity restrictions: Hx of parkinson's     Subjective   Pain: C/o back pain 6/10     Social/Functional History  Social/Functional History  Lives With: Alone  Type of Home: House  Home Layout: Two level, Able to Live on Main level with bedroom/bathroom  Home Access: Stairs to enter with rails  Entrance Stairs - Number of Steps: 5  Entrance Stairs - Rails: Both  Bathroom Shower/Tub: Tub/Shower unit  Bathroom Toilet: Standard  Bathroom Equipment: Shower chair, Hand-held shower  Bathroom Accessibility: Accessible  Home Equipment: [employer], [address] bed (pt reports using str cane for majority of functional mobility)  Has the patient had two or more falls in the past year or any fall with injury in the past year?: Yes  Receives Help From: Friend(s)  ADL Assistance: Independent  Bath: Moderate assistance  Dressing: Moderate assistance  Grooming: Moderate assistance  Feeding: Moderate assistance  Toileting: Needs assistance  Homemaking Assistance: Needs assistance (pt reports friends complete homemaking tasks)  Meal Prep: Total  Laundry: Total  Vacuuming: Total  Cleaning: Total  Gardening: Total  Yard Work: Total  Driving: Total  Shopping: Total  Homemaking Responsibilities: No  Ambulation Assistance: Independent  Transfer Assistance: Independent  Active : No  Mode of Transportation:  (medical cab)  Occupation: Retired  Additional Comments: Pt was recently at [employer]. Pt reports neighbors are able to provide prn assist upon discharge. Vision/Hearing       Cognition   Orientation  Overall Orientation Status: Within Functional Limits  Orientation Level: Oriented X4  Cognition  Overall Cognitive Status: WFL     Objective   Heart Rate: 53  Heart Rate Source: Monitor  BP: (!) 104/53  BP Location: Left upper arm  BP Method: Automatic  Patient Position: Semi fowlers  MAP (Calculated): 70  Resp: 17  SpO2: 99 %  O2 Device: None (Room air)   A,n 25 ft with a RW x CGA   A/AROM Exercises: Seated exercises with BLEs x 10 reps each: ankle pumps, marches, hip abduction/adduction, and LAQs      OutComes Score     AM-PAC Score   AM-PAC Inpatient Mobility Raw Score : 17 (03/04/23 1306)  AM-PAC Inpatient T-Scale Score : 42.13 (03/04/23 1306)  Mobility Inpatient CMS 0-100% Score: 50.57 (03/04/23 1306)  Mobility Inpatient CMS G-Code Modifier : CK (03/04/23 1306)  [place] Score     Goals  Short Term Goals  Time Frame for Short Term Goals: 5 to 7 visits  Short Term Goal 1: Pt to demonstrate transers with rolling walker at SBA  Short Term Goal 2: Pt able to ambualte with rolling walker distance of 50 ft x 2, SBA  Short Term Goal 3: Pt to demonstrate mod-I bed mobility with bed rail. Short Term Goal 4: Pt to demonstrate improve standing balance to fair during fucntional mobilityand reaching/bending task, to reduce fall risk. Education  Patient Education  Education Given To: Patient  Education Provided: Role of Therapy;Plan of Care;Precautions;Transfer Training; Fall Prevention Strategies  Education Method: Demonstration;Verbal;Teach Back  Education Outcome: Verbalized understanding;Demonstrated understanding;Continued education needed      Therapy Time   Individual Concurrent Group Co-treatment   Time In 0905         Time Out 0930         Minutes [facility]

## 2023-03-06 NOTE — DISCHARGE INSTR - COC
Continuity of Care Form    Patient Name: Sonam Lockhart   :  1940  MRN:  [id number]    Admit date:  3/2/2023  Discharge date:  ***    Code Status Order: Full Code   Advance Directives:     Admitting Physician:  Paola Patel MD  PCP: JW Mckeon CNP    Discharging Nurse:  [facility] Unit/Room#: 6257/9516-87  Discharging Unit Phone Number: [phone]    Emergency Contact:   Extended Emergency Contact Information  Primary Emergency Contact: Gill,Jhony  Address: n/a   Ben Chirinos [address] Phone: [phone]  Relation: Child  Secondary Emergency Contact: Dave Mccarthy  Address: [place] [address] Phone: [phone]  Mobile Phone: [phone]  Relation: Other    Past Surgical History:  Past Surgical History:   Procedure Laterality Date    CHOLECYSTECTOMY, LAPAROSCOPIC N/A 10/29/2022    LAPROSCOPIC CHOLECYSTECTOMY performed by Ansley Vann DO at [place] [address]  2012    poor prep, tubular adenoma    COLONOSCOPY  2017    diverticulosis, multiple polyps as described, spot marking done, pathology-tubular adenoma x 4    ESOPHAGOGASTRODUODENOSCOPY  2023    [place] COLSC FLX W/RMVL OF TUMOR POLYP LESION SNARE TQ N/A 2017    COLONOSCOPY POLYPECTOMY SNARE/COLD BIOPSY with tatooing and apc transverse colon performed by Mitchell Singer MD at [address] [place] [address] Right     rotator cuff    UPPER GASTROINTESTINAL ENDOSCOPY N/A 2023    EGD ESOPHAGOGASTRODUODENOSCOPY performed by Bhumika Padron MD at [address] [place] [address] History:   Immunization History   Administered Date(s) Administered    COVID-19, PFIZER Bivalent BOOSTER, DO NOT Dilute, (age 12y+), IM, 30 mcg/0.3 mL 2022    COVID-19, PFIZER GRAY top, DO NOT Dilute, (age 15 y+), IM, 30 mcg/0.3 mL 2022    COVID-19, PFIZER PURPLE top, DILUTE for use, (age 15 y+), 30mcg/0.3mL 2021, 2021, 2021    Influenza 2012    Influenza, FLUAD, (age 72 y+), Adjuvanted, 0.5mL 10/19/2020    Influenza, High Dose (Fluzone 65 yrs and older) 09/22/2014, 12/05/2016, 08/07/2017, 12/19/2017    Influenza, Triv, inactivated, subunit, adjuvanted, IM (Fluad 65 yrs and older) 08/23/2018, 10/06/2019    Pneumococcal Conjugate 13-valent (Lois Bash) 06/14/2017, 05/18/2018, 05/18/2018    Pneumococcal Polysaccharide ([id number]) 09/25/2012, 12/04/2015       Active Problems:  Patient Active Problem List   Diagnosis Code    Temporary loss of eyesight H53.129    Syncope : posterior circulation stroke vs Neurocardiogenic syncope  R55    Intracranial bleed : traumatic left sub-dural hematoma ,managed conservatively in 2011 I62.9    Headache R51.9    CKD (chronic kidney disease) stage 3, GFR 30-59 ml/min ([facility]) N18.30    Depression F32. A    Hyperlipidemia E78.5    Microhematuria R31.29    Microalbuminuria R80.9    Essential hypertension I10    Generalized abdominal pain R10.84    Tubular adenoma of colon D12.6    Diverticulosis of colon K57.30    History of skull fracture Z87.81    Nausea R11.0    Pure hypercholesterolemia E78.00    Prediabetes R73.03    Parkinson disease ([facility] [address] G20    Chronic post-traumatic headache, not intractable G44.329    Fall (on) (from) other stairs and steps, initial encounter W10. 8XXA    Strain of iliopsoas muscle, initial encounter S76.919A    Chronic pain of left knee M25.562, G89.29    Closed fracture of second toe of right foot S92.501A    Closed fracture of second toe of left foot S92.502A    Abnormal ECG R94.31    Cerebrovascular accident [facility]) I63.9    Chest pain, unspecified R07.9    Hematoma of scalp S00. 03XA    Left ventricular hypertrophy I51.7    Mild aortic valve regurgitation I35.1    Pulmonary hypertension, mild (HCC) I27.20    Lumbar radiculopathy M54.16    Dizziness R42    Hyponatremia E87.1    Vomiting R11.10    General weakness R53.1    Overweight (BMI 25.0-29. 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[facility]) M46.96    Stage 3b chronic kidney disease (CKD) ([facility]) N18.32    Chronic pain of multiple joints M25.50, G89.29    Multiple comorbid conditions R69    Gout M10.9    Nerve pain M79.2    NSTEMI (non-ST elevated myocardial infarction) ([facility]) I21.4    History of subdural hematoma Z86.79    Coronary artery disease involving native heart with angina pectoris ([facility]) I25.119    Lumbar facet arthropathy M47.816    Spinal stenosis of lumbar region without neurogenic claudication M48.061    Generalized weakness R53.1    Lumbosacral spondylosis without myelopathy M47.817    Gallstone pancreatitis K85.10    Calculus of gallbladder with acute cholecystitis without obstruction K80.00    Bandemia D72.825    Pulmonary nodule R91.1    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    Acute cholecystitis K81.0    TAMARA (acute kidney injury) ([facility] [address] N17.9    History of non-ST elevation myocardial infarction (NSTEMI) 6/2022 I25.2    Postoperative retention of urine N99.89, R33.8    History of coronary artery stent placement Z95.5    Nausea & vomiting R11.2    Intractable nausea and vomiting R11.2    Epigastric pain R10.13    Hyperemesis R11.10    Esophagitis K20.90    Chest pain R07.9    Dysphagia R13.10    Failure to thrive in adult R62.7       Isolation/Infection:   Isolation            Contact          Patient Infection Status       Infection Onset Added Last Indicated Last Indicated By Review Planned Expiration Resolved Resolved By    MRSA  09/25/15 09/25/15 Guanakito García RN        Leg 9/23/15    Resolved    COVID-19 (Rule Out) 01/06/23 01/06/23 01/06/23 COVID-19, Rapid (Ordered)   01/06/23 Rule-Out Test Resulted    COVID-19 (Rule Out) 10/25/22 10/25/22 10/25/22 COVID-19, Rapid (Ordered)   10/25/22 Rule-Out Test Resulted    COVID-19 (Rule Out) 09/01/21 09/01/21 09/01/21 COVID-19, Rapid (Ordered)   09/01/21 Rule-Out Test Resulted            Nurse Assessment:  Last Vital Signs: BP (!) 104/53   Pulse 53   Temp 97.9 °F (36.6 °C) (Oral)   Resp 17   Wt 179 lb (81.2 kg)   SpO2 99%   BMI 28.04 kg/m²     Last documented pain score (0-10 scale): Pain Level: 5  Last Weight:   Wt Readings from Last 1 Encounters:   03/02/23 179 lb (81.2 kg)     Mental Status:  oriented and alert    IV Access:- None    Nursing Mobility/ADLs:  Walking   Assisted x 1 with walker  Transfer  Assisted  Bathing  Assisted: needs set up  Dressing  Assisted  Toileting  Assisted with ambulation to bathroom  Feeding  Independent  Med Admin  Independent  Med Delivery   whole and in applesauce    Wound Care Documentation and Therapy:        Elimination:  Continence: Bowel: Yes  Bladder: Yes  Urinary Catheter: None   Colostomy/Ileostomy/Ileal Conduit: No       Date of Last BM: 3/9/2023    Intake/Output Summary (Last 24 hours) at 3/6/2023 1021  Last data filed at 3/6/2023 0101  Gross per 24 hour   Intake --   Output 300 ml   Net -300 ml     I/O last 3 completed shifts:  In: -   Out: 300 [Urine:300]    Safety Concerns: At Risk for Falls    Impairments/Disabilities:    Vision: wears glasses    Nutrition Therapy:  Current Nutrition Therapy:   - Oral Diet:  Dysphagia - Soft and Bite Sized    Routes of Feeding: Oral  Liquids: Thin Liquids  Daily Fluid Restriction: no  Last Modified Barium Swallow with Video (Video Swallowing Test):    Modified swallow study done 3/3/2023 by speech therapy    Treatments at the Time of Hospital Discharge:   Respiratory Treatments: none  Oxygen Therapy:  is not on home oxygen therapy.   Ventilator:  - No ventilator support    Rehab Therapies: Physical Therapy and Occupational Therapy  Weight Bearing Status/Restrictions: No weight bearing restrictions  Other Medical Equipment (for information only, NOT a DME order):  walker  Other Treatments: none    Patient's personal belongings (please select all that are sent with patient):  javier Brooke RN SIGNATURE:  Electronically signed by Thuan Sultana on 3/8/23 at 10:46 AM EST    CASE MANAGEMENT/SOCIAL WORK SECTION    Inpatient Status Date: 3-4-2023    Readmission Risk Assessment Score:  Readmission Risk              Risk of Unplanned Readmission:  29           Discharging to Facility/ Agency   Name: [facility]  Address:  Phone:  Fax:        / signature: Electronically signed by Bridger Pina RN on 3/8/23 at 10:51 AM EST    PHYSICIAN SECTION    Prognosis: Fair    Condition at Discharge: Stable    Rehab Potential (if transferring to Rehab): Good    Recommended Labs or Other Treatments After Discharge:     Physician Certification: I certify the above information and transfer of Gavi Hernandez  is necessary for the continuing treatment of the diagnosis listed and that he requires {Admit to Appropriate Level of Care:18726} for greater 30 days.      Update Admission H&P: No change in H&P    PHYSICIAN SIGNATURE:  Electronically signed by Manoj Geronimo MD on 3/6/23 at 10:21 AM EST, Read Duverney MD, 3/9/23

## 2023-03-06 NOTE — CARE COORDINATION
TRansitional planning    Leola Aguilera, [phone] and left message requesting return phone call. [address] to patient. He wants to go to another SNf if PP not able to accept back. Need choices, gave SNF list.    [address] and left message requesting return phone call. Spoke to patient about another SNF choice. CM reviewed with him and he chose [facility]. Referral made. [facility] and left vm requesting call back.

## 2023-03-06 NOTE — PROGRESS NOTES
[facility]  CDU / OBSERVATION eNCOUnter  Attending NOte       I performed a history and physical examination of the patient and discussed management with the resident. This patient was placed in the observation unit for reevaluation for possible admission to the hospital. I reviewed the residents note and agree with the documented findings and plan of care. Any areas of disagreement are noted on the chart. I was personally present for the key portions of any procedures. I have documented in the chart those procedures where I was not present during the key portions. I have reviewed the nurses notes. I agree with the chief complaint, past medical history, past surgical history, allergies, medications, social and family history as documented unless otherwise noted below. The patient was placed in the observation unit for reevaluation for possible admission to the hospital.      The Family history, social history, and ROS are effectively unchanged since admission unless noted elsewhere in the chart. No new acute issues this AM. No vomiting. Discharge planning.      Abigail Clark MD  Attending Emergency  Physician

## 2023-03-06 NOTE — ADT AUTH CERT
Utilization Reviews       Chest Pain - Care Day 4 (3/5/2023) by Italia Ryan RN       Review Status Review Entered   Completed 3/6/2023 0930       Created By   Italia Ryan RN      Criteria Review      Care Day: 4 Care Date: 3/5/2023 Level of Care: Telemetry    Guideline Day 2    Level Of Care    (X) ICU, intermediate care, or telemetry to discharge    3/6/2023 9:30 AM EST by Russel Yi      acute    Clinical Status    ( ) * Hemodynamic stability    3/6/2023 9:30 AM EST by Italia Ryan 115/58    ( ) * Acute coronary syndrome ruled out    (X) * Pain absent or managed    3/6/2023 9:30 AM EST by Russel Yi      pain  5    ( ) * Dangerous arrhythmia absent    ( ) * No identification of etiology of pain requiring inpatient care    ( ) * Discharge plans and education understood    Activity    (X) * Ambulatory or acceptable for next level of care    3/6/2023 9:30 AM EST by Russel Yi      up with assist    Routes    (X) * Oral hydration    ( ) * Oral medications or regimen acceptable for next level of care    (X) * Oral diet or acceptable for next level of care    3/6/2023 9:30 AM EST by Russel Yi      reg diet    Medications    (X) Possible aspirin or cardiac medications    3/6/2023 9:30 AM EST by Russel Yi      norvasc 5 mg daily  lipitor 40 mg  daily  plavix 75 mg daily   imdur 30 mg  daily  lopressor 12.5 mg  2xd    * Milestone   Additional Notes   DATE: 3.5.23       VITALS:   98.4 (36.9) 16 49 115/58  Sp0 298%  ra pain  5                ABNL/PERTINENT LABS/RADIOLOGY/DIAGNOSTIC STUDIES:   CT ABDOMEN PELVIS    . No acute findings in the abdomen or pelvis   2. Decreased size of the low-attenuation structure in the gallbladder fossa   of likely reflects a postoperative seroma. 3. Small hiatal hernia. 4. Colonic diverticulosis. 5. Mild prostatomegaly.             PHYSICAL EXAM:   General: NAD, AO X 3   Heent: EMOI, PERRL   Neck: SUPPLE, NO JVD   Cardiovascular: RRR, S1S2 Pulmonary: CTAB, NO SOB   Abdomen: SOFT, NTTP, ND, +BS   Extremities: +2/4 PULSES DISTAL, NO SWELLING   Neuro / Psych: NO NUMBNESS OR TINGLING, MENTATION AT BASELINE         MD CONSULTS/ASSESSMENT AND PLAN:   Dacia    No acute events overnight. Having some burning in his belly. Trying ginger ale and water with some improvement. Ok to try maalox. No vomiting. Would like to go a facility at discharge as he feels he was doing better than he was at home. Has been able to tolerate a full diet without vomiting. The patient is urinating on his own and is passing flatus. Denies fever, chills, nausea, vomiting, chest pain, shortness of breath, focal weakness, numbness, tingling, urinary/bowel symptoms, vision changes, visual hallucinations, or headache. `      1. Chest pain / epigastric pain:   1. Cardiology consulted. 1. Atypical chest pain - non cardiac in nature -Continue Plavix 75 mg, Lipitor 40 mg, Imdur 30 mg, Lopressor 25 mg and norvasc    2. No further cardiac work up needed , cardiology is signing off    2. Neurology consulted. § Optimized Requip dose to 0.5 tid; tolerating it well so far; to continue same along with Sinemet IR 25/100 qid & Comtan 200 mg qid. § For esophagitis; GI on board; to continue PPI. [address] Neurology is signing off. 3. GI consulted. 1. No indications at this time for repeat endoscopy is most likely patient simply needs a longer course of treatment   2. Continue Protonix 40 mg twice daily 30 minutes before meals   3. Soft diet. Supplements 2-3 times daily until patient is able to swallow more food. 4. Appreciate input and recommendations from neurology   5. Patient will need to follow-up in the GI clinic in 1 month with Dr. Niki Marsh. 6. No further recommendations at this time GI will sign off   2. Dysphagia:   1.  Modified barium swallow: safe swallow for Easy to [employer] with thin liquids as evidenced by no overt s/s of aspiration noted with consistencies tested   2. small sips and bites, only feed when alert and awake and upright at 90 degrees for all PO intake         MEDICATIONS: malox  30 ml x1    Elavil 10 mg  nightly,  sinemet   mg   4xd,  comtan 200 mg   4xd,  neuroitn 100 mg   3xd, requip 0.5 mg   3xd, carafate 1 g m 4xd, flomax 0.4 mg   daily, protonix 40 mg   2xd   zofran 4 mg odt  8hprn x1       ORDERS:   Reg diet ,  pt.ot  up with assist  vs       PT/OT/SLP/CM ASSESSMENT OR NOTES:    Dc plan   placement         Chest Pain - Care Day 3 (3/4/2023) by Bashir Leonardo, MARCO ANTONIO       Review Status Review Entered   Completed 3/6/2023 0916       Created By   Bashir Leonardo RN      Criteria Review      Care Day: 3 Care Date: 3/4/2023 Level of Care: Telemetry    Guideline Day 2    Level Of Care    (X) ICU, intermediate care, or telemetry to discharge    3/6/2023 9:16 AM EST by Mark Gilman      acute    Clinical Status    (X) * Hemodynamic stability    3/6/2023 9:16 AM EST by Mark Gilman      64 119/68    ( ) * Acute coronary syndrome ruled out    (X) * Pain absent or managed    3/6/2023 9:16 AM EST by Mark Gilman      pain 5    ( ) * Dangerous arrhythmia absent    ( ) * No identification of etiology of pain requiring inpatient care    ( ) * Discharge plans and education understood    Activity    (X) * Ambulatory or acceptable for next level of care    3/6/2023 9:16 AM EST by Mark Gilman      up with assist    Routes    (X) * Oral hydration    ( ) * Oral medications or regimen acceptable for next level of care    (X) * Oral diet or acceptable for next level of care    3/6/2023 9:16 AM EST by Mark Gilman      reg diet    Interventions    (X) Possible stress test or cardiac catheterization    3/6/2023 9:16 AM EST by Mark Gilman      cardiology : Atypical chest pain - non cardiac in nature    Medications    (X) Possible aspirin or cardiac medications    3/6/2023 9:16 AM EST by Mark Gilman      norvasc 5 mg   daily,  lipitor 40 mg  daily,  plavix 75 mg  daily,    imdur  30 mg daily,  lopressor  12.5 mg  2xd,    * Milestone   Additional Notes   DATE: 3.4.23             VITALS:   98.5 (36.9) - 64 119/68  Sp02 96%  ra  pain 5             ABNL/PERTINENT LABS/RADIOLOGY/DIAGNOSTIC STUDIES:   Na       PHYSICAL EXAM:   General: NAD, AO X 3   Heent: EMOI, PERRL   Neck: SUPPLE, NO JVD   Cardiovascular: RRR, S1S2   Pulmonary: CTAB, NO SOB   Abdomen: SOFT, NTTP, ND, +BS   Extremities: +2/4 PULSES DISTAL, NO SWELLING   Neuro / Psych: NO NUMBNESS OR TINGLING, MENTATION AT BASELINE      MD CONSULTS/ASSESSMENT AND PLAN:   Medicine    No acute events overnight. Patient feeling much better this morning. Patient said he was feeling well until yesterday evening when he all of a sudden felt the need to throw up. Said he vomited a couple times and felt like it was not going to stop so he called 911. Has been able to tolerate a full diet without nausea or vomiting. The patient is urinating on his own and is passing flatus. Last BM was yesterday. Denies fever, chills, nausea, vomiting, chest pain, shortness of breath, abdominal pain, focal weakness, numbness, tingling, urinary/bowel symptoms, vision changes, visual hallucinations, or headache. `       1. Chest pain / epigastric pain:   1. Cardiology consulted. 1. Atypical chest pain - non cardiac in nature -Continue Plavix 75 mg, Lipitor 40 mg, Imdur 30 mg, Lopressor 25 mg and norvasc    2. No further cardiac work up needed , cardiology is signing off    2. Neurology consulted. § Optimized Requip dose to 0.5 tid; tolerating it well so far; to continue same along with Sinemet IR 25/100 qid & Comtan 200 mg qid. § For esophagitis; GI on board; to continue PPI. [address] Neurology is signing off. 3. GI consulted. 1. No indications at this time for repeat endoscopy is most likely patient simply needs a longer course of treatment   2. Continue Protonix 40 mg twice daily 30 minutes before meals   3. Soft diet.   Supplements 2-3 times daily until patient is able to swallow more food. 4. Appreciate input and recommendations from neurology   5. Patient will need to follow-up in the GI clinic in 1 month with Dr. Von Madden. 6. No further recommendations at this time GI will sign off   2. Dysphagia:   1. Modified barium swallow: safe swallow for Easy to [employer] with thin liquids as evidenced by no overt s/s of aspiration noted with consistencies tested   2. small sips and bites, only feed when alert and awake and upright at 90 degrees for all PO intake. 3. Solid consistency: Easy to Chew   4. Liquid consistency: Thin   5. Liquid administration via: Cup;Straw   6. Requires SLP Intervention: Yes   7. D/C Recommendations: Ongoing speech therapy is recommended at next level of care; Ongoing speech therapy is recommended during this hospitalization   8. Recommended Exercises:    9. Therapeutic Interventions: Diet tolerance monitoring   10. Patient ordering easy to chew food off regular menu, will continue to encourage adequate intake         Neurology    Is vitally stable, alert and oriented x 3.  No acute events overnight    Continues to have abdominal pain    Intermittently able to swallow   Poor oral intake due ab pain and Nausea       GENERAL Appears comfortable and in no distress   HEENT NC/ AT   HEART S1 and S2 heard; palpation of pulses: radial pulse    NECK Supple and no bruits heard   MENTAL STATUS: Alert, oriented, intact memory, no confusion, normal speech, normal language, no hallucination or delusion   CRANIAL NERVES: II     -      Visual fields intact to confrontation   III,IV,VI -  PERR, EOMs full, no ptosis   V     -     Normal facial sensation    VII    -     Normal facial symmetry   VIII   -     Intact hearing    IX,X -     Symmetrical palate   XI    -     Symmetrical shoulder shrug   XII   -     Midline tongue, no atrophy    MOTOR FUNCTION: RUE: Significant for good strength of grade 5/5 in proximal and distal muscle groups    LUE: Significant for good strength of grade 5/5 in proximal and distal muscle groups    RLE: Significant for good strength of grade 5/5 in proximal and distal muscle groups    LLE: Significant for good strength of grade 5/5 in proximal and distal muscle groups         Normal bulk, normal tone and no involuntary movements, no tremor   SENSORY FUNCTION: Normal touch, normal pinprick, normal vibration, normal proprioception   CEREBELLAR FUNCTION: Resting pill-rolling tremor noticed bilaterally more pronounced on right side, decreased hand tapping speed in the right hand as compared to left   REFLEX FUNCTION: Symmetric in upper and lower extremities, no Babinski sign      Continue with Sinemet  mg 4 times daily. Continue entacapone 200 mg 4 times dailyplease take 1 wake up at 7:30 AM, at noon and at 4 PM and at bedtime       Requip increased from 0.25 mg 2.5 mg 3 times daily       Likely has esophagitis due to multiple pills, will likely benefit from deep brain stimulation that might decrease oral pills and allow GI system to recover             Cardiology    Ate most of his breakfast noted  Denies chest pain or shortness of breath      General appearance: alert and cooperative with exam   HEENT: Head: Normocephalic, no lesions, without obvious abnormality. Neck:no JVD, trachea midline, no adenopathy   Lungs: Clear to auscultation   Heart: Regular rate and rhythm, s1/s2 auscultated, no murmurs   Abdomen: soft, non-tender, bowel sounds active   Extremities: no edema      Assessment / Acute Cardiac Problems:   1. Atypical chest pain- likely GI in origin. Chronic mild HS trop elevation   2. History of CAD with stenting proximal LAD in May 2018. With patent stent in June 2022-on Plavix, Lipitor 40 mg, Imdur 30 mg, Lopressor 25 mg   3. GERD-EGD-1/9/2023-severe grade D reflux esophagitis with esophageal ulcerations, takes Protonix 40 mg daily, sucralfate   4. Hypertension   5.  History of type 2 diabetes-currently not on any medications-last HbA1c June 2022-6.4   6. CKD stage II with baseline creatinine 1.3-1.4   7. Parkinson's disease       1. Atypical chest pain - non cardiac in nature -Continue Plavix 75 mg, Lipitor 40 mg, Imdur 30 mg, Lopressor 25 mg and norvasc    2. No further cardiac work up needed , cardiology is signing off                   MEDICATIONS:   Elavil 10 mg  nightly,  sinemet   mg   4xd,  comtan 200 mg   4xd,  neuroitn 100 mg   3xd, prootnix 40 mg iv  daily,  requip 0.5 mg   3xd, carafate 1 g m 4xd, flomax 0.4 mg   daily, tylenol 650 mg  q6hprn x2,       ORDERS:   Reg diet ,  pt.ot  up with assist  vs       PT/OT/SLP/CM ASSESSMENT OR NOTES:    Physical Therapy   Therapy recommended at discharge, Patient would benefit from continued therapy after discharge    Assessment: Pt admitted with chest pain/epigastric pain. Pt with history of parkinson's, and head injury. Pt demonstrates slow processing, slow mobility, B UE tremors and high fall risk. Pt with multiple admsiisions to the hospital in last few months. Pt requires assistance for all mobility at this time, Pt lives alone. Will benefit from continued physical therapy while inacute care phase as well as post discharge. Treatment Diagnosis: Impaired fucntion.    Therapy Prognosis: Fair   Decision Making: Medium Complexity      Dc plan  placement

## 2023-03-06 NOTE — PROGRESS NOTES
OBS/CDU   RESIDENT NOTE      Patients PCP is:  JW Lindsay CNP        SUBJECTIVE      No acute events overnight. Patient says he is feeling a little foggy this morning. Laying back in chair, covered up, lights off. Patient continues to express concern that he is having these episodes of lightheadedness and nausea with some vomiting occasionally. Said that he feels weaker than he used to after he got his gallbladder removed. Wonders if this is a result of residual infection. Discussed with patient that it is likely associated with decreased oral intake and multiple prolonged hospitalizations. Encourage patient to continue drinking fluids and eating enough as well as getting out of bed as he is able. Patient expressed that he has been having difficulty making it to his primary care appointments and doing normal ADLs. Says his neighbor has helped him previously. Discussed with patient that it may be time for him to look into more long-term care options rather than living alone. Patient expressed verbal understanding. Has been able to tolerate a full diet without vomiting. The patient is urinating on his own and is passing flatus. Denies fever, chills, nausea, vomiting, chest pain, shortness of breath, focal weakness, numbness, tingling, urinary/bowel symptoms, vision changes, visual hallucinations, or headache. `    PHYSICAL EXAM      General: NAD, AO X 3  Heent: EMOI, PERRL  Neck: SUPPLE, NO JVD  Cardiovascular: RRR, S1S2  Pulmonary: CTAB, NO SOB  Abdomen: SOFT, NTTP, ND, +BS  Extremities: +2/4 PULSES DISTAL, NO SWELLING  Neuro / Psych: NO NUMBNESS OR TINGLING, MENTATION AT BASELINE    PERTINENT TEST /EXAMS      I have reviewed all available laboratory results.     MEDICATIONS CURRENT   pantoprazole (PROTONIX) 40 mg in sodium chloride (PF) 0.9 % 10 mL injection, BID  amLODIPine (NORVASC) tablet 5 mg, Daily  rOPINIRole (REQUIP) tablet 0.5 mg, TID  sodium chloride flush 0.9 % injection 5-40 mL, 2 times per day  sodium chloride flush 0.9 % injection 5-40 mL, PRN  0.9 % sodium chloride infusion, PRN  enoxaparin (LOVENOX) injection 40 mg, Daily  ondansetron (ZOFRAN-ODT) disintegrating tablet 4 mg, Q8H PRN   Or  ondansetron (ZOFRAN) injection 4 mg, Q6H PRN  polyethylene glycol (GLYCOLAX) packet 17 g, Daily PRN  acetaminophen (TYLENOL) tablet 650 mg, Q6H PRN   Or  acetaminophen (TYLENOL) suppository 650 mg, Q6H PRN  amitriptyline (ELAVIL) tablet 10 mg, Nightly  atorvastatin (LIPITOR) tablet 40 mg, Daily  carbidopa-levodopa (SINEMET)  MG per tablet 1 tablet, 4x Daily  clopidogrel (PLAVIX) tablet 75 mg, Daily  entacapone (COMTAN) tablet 200 mg, 4x Daily  gabapentin (NEURONTIN) capsule 100 mg, TID  isosorbide mononitrate (IMDUR) extended release tablet 30 mg, Daily  metoprolol tartrate (LOPRESSOR) tablet 12.5 mg, BID  sucralfate (CARAFATE) tablet 1 g, 4x Daily  tamsulosin (FLOMAX) capsule 0.4 mg, Daily      All medication charted and reviewed. CONSULTS      IP CONSULT TO CARDIOLOGY  IP CONSULT TO NEUROLOGY  IP CONSULT TO GI    ASSESSMENT/PLAN       Rhett Juarez is a 79 y.o. male who presents with epigastric and chest pain     Chest pain / epigastric pain:  Cardiology consulted. Atypical chest pain - non cardiac in nature -Continue Plavix 75 mg, Lipitor 40 mg, Imdur 30 mg, Lopressor 25 mg and norvasc   No further cardiac work up needed , cardiology is signing off   Neurology consulted. Optimized Requip dose to 0.5 tid; tolerating it well so far; to continue same along with Sinemet IR 25/100 qid & Comtan 200 mg qid. For esophagitis; GI on board; to continue PPI. Neurology is signing off. GI consulted. No indications at this time for repeat endoscopy is most likely patient simply needs a longer course of treatment  Continue Protonix 40 mg twice daily 30 minutes before meals  Soft diet. Supplements 2-3 times daily until patient is able to swallow more food.    Appreciate input and recommendations from neurology  Patient will need to follow-up in the GI clinic in 1 month with Dr. Danita Hammonds. No further recommendations at this time GI will sign off  Dysphagia:  Modified barium swallow: safe swallow for Easy to [employer] with thin liquids as evidenced by no overt s/s of aspiration noted with consistencies tested  small sips and bites, only feed when alert and awake and upright at 90 degrees for all PO intake. Solid consistency: Easy to Chew  Liquid consistency: Thin  Liquid administration via: Cup;Straw  Requires SLP Intervention: Yes  D/C Recommendations: Ongoing speech therapy is recommended at next level of care; Ongoing speech therapy is recommended during this hospitalization  Recommended Exercises:    Therapeutic Interventions: Diet tolerance monitoring  Patient ordering easy to chew food off regular menu, will continue to encourage adequate intake  Nausea/Vomiting/Abd pain  Recent CT with small fluid collection adjacent to gallbladder fossa  Recent cholecystectomy  Minimal improvement with zofran and protonix  Will get CT abdomen/pelvis today to evaluate for intra-abdominal pathology  Home care/needs status:  Nursing spoke with [facility] care worker. Patient has been referred for PT, OT and skilled nursing. So far, patient has not received PT/OT but has been seen by skilled nursing. Patient is scheduled to receive skilled nursing once a week for the next 7 weeks  [place] has not reported concerns for patient's ability to care for self or home conditions. Patient lives with son who works during the day. Case management working on options for long-term health facility placement. This in the setting of multiple return to the hospital following SNF discharge as an outpatient.   Waiting for callback from [facility] to determine whether his insurance will cover further stays  Continue home medications and pain control  Monitor vitals, labs, and imaging  DISPO: pending clinical improvement and placement    --  Zan Morataya MD  Emergency Medicine Resident Physician     This dictation was generated by voice recognition computer software. Although all attempts are made to edit the dictation for accuracy, there may be errors in the transcription that are not intended.

## 2023-03-07 PROCEDURE — 6370000000 HC RX 637 (ALT 250 FOR IP): Performed by: STUDENT IN AN ORGANIZED HEALTH CARE EDUCATION/TRAINING PROGRAM

## 2023-03-07 PROCEDURE — 6360000002 HC RX W HCPCS

## 2023-03-07 PROCEDURE — C9113 INJ PANTOPRAZOLE SODIUM, VIA: HCPCS

## 2023-03-07 PROCEDURE — 1200000000 HC SEMI PRIVATE

## 2023-03-07 PROCEDURE — 97535 SELF CARE MNGMENT TRAINING: CPT

## 2023-03-07 PROCEDURE — 6370000000 HC RX 637 (ALT 250 FOR IP)

## 2023-03-07 PROCEDURE — 2580000003 HC RX 258

## 2023-03-07 PROCEDURE — 97530 THERAPEUTIC ACTIVITIES: CPT

## 2023-03-07 RX ORDER — ROPINIROLE 0.5 MG/1
0.5 TABLET, FILM COATED ORAL 3 TIMES DAILY
Qty: 90 TABLET | Refills: 0 | Status: SHIPPED | OUTPATIENT
Start: 2023-03-07 | End: 2023-04-06

## 2023-03-07 RX ORDER — AMLODIPINE BESYLATE 5 MG/1
5 TABLET ORAL DAILY
Qty: 30 TABLET | Refills: 0 | Status: SHIPPED | OUTPATIENT
Start: 2023-03-08 | End: 2023-04-07

## 2023-03-07 RX ORDER — ACETAMINOPHEN 325 MG/1
650 TABLET ORAL ONCE
Status: COMPLETED | OUTPATIENT
Start: 2023-03-07 | End: 2023-03-07

## 2023-03-07 RX ADMIN — ENOXAPARIN SODIUM 40 MG: 100 INJECTION SUBCUTANEOUS at 08:39

## 2023-03-07 RX ADMIN — ENTACAPONE 200 MG: 200 TABLET, FILM COATED ORAL at 17:02

## 2023-03-07 RX ADMIN — SODIUM CHLORIDE, PRESERVATIVE FREE 20 ML: 5 INJECTION INTRAVENOUS at 08:49

## 2023-03-07 RX ADMIN — TAMSULOSIN HYDROCHLORIDE 0.4 MG: 0.4 CAPSULE ORAL at 08:38

## 2023-03-07 RX ADMIN — ENTACAPONE 200 MG: 200 TABLET, FILM COATED ORAL at 14:41

## 2023-03-07 RX ADMIN — SUCRALFATE 1 G: 1 TABLET ORAL at 17:03

## 2023-03-07 RX ADMIN — METOPROLOL TARTRATE 12.5 MG: 25 TABLET ORAL at 20:30

## 2023-03-07 RX ADMIN — ENTACAPONE 200 MG: 200 TABLET, FILM COATED ORAL at 20:30

## 2023-03-07 RX ADMIN — SODIUM CHLORIDE, PRESERVATIVE FREE 40 MG: 5 INJECTION INTRAVENOUS at 08:49

## 2023-03-07 RX ADMIN — ISOSORBIDE MONONITRATE 30 MG: 30 TABLET, EXTENDED RELEASE ORAL at 08:38

## 2023-03-07 RX ADMIN — GABAPENTIN 100 MG: 100 CAPSULE ORAL at 14:40

## 2023-03-07 RX ADMIN — GABAPENTIN 100 MG: 100 CAPSULE ORAL at 08:39

## 2023-03-07 RX ADMIN — CARBIDOPA AND LEVODOPA 1 TABLET: 25; 100 TABLET ORAL at 17:02

## 2023-03-07 RX ADMIN — GABAPENTIN 100 MG: 100 CAPSULE ORAL at 20:30

## 2023-03-07 RX ADMIN — SUCRALFATE 1 G: 1 TABLET ORAL at 20:30

## 2023-03-07 RX ADMIN — SUCRALFATE 1 G: 1 TABLET ORAL at 08:40

## 2023-03-07 RX ADMIN — DESMOPRESSIN ACETATE 40 MG: 0.2 TABLET ORAL at 08:41

## 2023-03-07 RX ADMIN — CARBIDOPA AND LEVODOPA 1 TABLET: 25; 100 TABLET ORAL at 14:40

## 2023-03-07 RX ADMIN — SODIUM CHLORIDE, PRESERVATIVE FREE 40 MG: 5 INJECTION INTRAVENOUS at 20:30

## 2023-03-07 RX ADMIN — ROPINIROLE HYDROCHLORIDE 0.5 MG: 0.25 TABLET, FILM COATED ORAL at 20:30

## 2023-03-07 RX ADMIN — ROPINIROLE HYDROCHLORIDE 0.5 MG: 0.25 TABLET, FILM COATED ORAL at 14:40

## 2023-03-07 RX ADMIN — ENTACAPONE 200 MG: 200 TABLET, FILM COATED ORAL at 08:39

## 2023-03-07 RX ADMIN — ONDANSETRON 4 MG: 4 TABLET, ORALLY DISINTEGRATING ORAL at 10:04

## 2023-03-07 RX ADMIN — ROPINIROLE HYDROCHLORIDE 0.5 MG: 0.25 TABLET, FILM COATED ORAL at 08:38

## 2023-03-07 RX ADMIN — CLOPIDOGREL 75 MG: 75 TABLET, FILM COATED ORAL at 08:41

## 2023-03-07 RX ADMIN — CARBIDOPA AND LEVODOPA 1 TABLET: 25; 100 TABLET ORAL at 08:38

## 2023-03-07 RX ADMIN — ACETAMINOPHEN 650 MG: 325 TABLET ORAL at 00:53

## 2023-03-07 RX ADMIN — ACETAMINOPHEN 650 MG: 325 TABLET ORAL at 10:11

## 2023-03-07 RX ADMIN — SUCRALFATE 1 G: 1 TABLET ORAL at 14:40

## 2023-03-07 RX ADMIN — AMITRIPTYLINE HYDROCHLORIDE 10 MG: 10 TABLET, FILM COATED ORAL at 20:30

## 2023-03-07 RX ADMIN — CARBIDOPA AND LEVODOPA 1 TABLET: 25; 100 TABLET ORAL at 20:30

## 2023-03-07 RX ADMIN — SODIUM CHLORIDE, PRESERVATIVE FREE 10 ML: 5 INJECTION INTRAVENOUS at 21:17

## 2023-03-07 RX ADMIN — AMLODIPINE BESYLATE 5 MG: 5 TABLET ORAL at 08:41

## 2023-03-07 ASSESSMENT — PAIN DESCRIPTION - LOCATION
LOCATION: HEAD
LOCATION: GENERALIZED
LOCATION: HEAD

## 2023-03-07 ASSESSMENT — PAIN DESCRIPTION - DESCRIPTORS
DESCRIPTORS: ACHING;SORE
DESCRIPTORS: ACHING;DISCOMFORT
DESCRIPTORS: ACHING

## 2023-03-07 ASSESSMENT — PAIN SCALES - GENERAL
PAINLEVEL_OUTOF10: 3
PAINLEVEL_OUTOF10: 5
PAINLEVEL_OUTOF10: 3

## 2023-03-07 ASSESSMENT — PAIN DESCRIPTION - PAIN TYPE: TYPE: CHRONIC PAIN

## 2023-03-07 NOTE — PROGRESS NOTES
OBS/CDU   RESIDENT NOTE      Patients PCP is:  JW Delacruz - FELIPE        SUBJECTIVE      Patient states his chest and epigastric pain has greatly improved. Patient does note still some dryness in his throat with swallowing. Notes mild headache this morning. Otherwise patient states he feels well and overall better than when he arrived at the hospital.      No acute events overnight. Has been able to tolerate a full diet without nausea or vomiting. The patient is urinating on his own and is passing flatus. Denies fever, chills, nausea, vomiting, chest pain, shortness of breath, abdominal pain, focal weakness, numbness, tingling, urinary/bowel symptoms. PHYSICAL EXAM      General: NAD, AO X 3  Heent: EMOI, PERRL  Neck: SUPPLE, NO JVD  Cardiovascular: RRR, S1S2  Pulmonary: CTAB, NO SOB  Abdomen: SOFT, NTTP, ND, +BS  Extremities: +2/4 PULSES DISTAL, NO SWELLING  Neuro / Psych: NO NUMBNESS OR TINGLING, MENTATION AT BASELINE    PERTINENT TEST /EXAMS      I have reviewed all available laboratory results.     MEDICATIONS CURRENT   pantoprazole (PROTONIX) 40 mg in sodium chloride (PF) 0.9 % 10 mL injection, BID  amLODIPine (NORVASC) tablet 5 mg, Daily  rOPINIRole (REQUIP) tablet 0.5 mg, TID  sodium chloride flush 0.9 % injection 5-40 mL, 2 times per day  sodium chloride flush 0.9 % injection 5-40 mL, PRN  0.9 % sodium chloride infusion, PRN  enoxaparin (LOVENOX) injection 40 mg, Daily  ondansetron (ZOFRAN-ODT) disintegrating tablet 4 mg, Q8H PRN   Or  ondansetron (ZOFRAN) injection 4 mg, Q6H PRN  polyethylene glycol (GLYCOLAX) packet 17 g, Daily PRN  acetaminophen (TYLENOL) tablet 650 mg, Q6H PRN   Or  acetaminophen (TYLENOL) suppository 650 mg, Q6H PRN  amitriptyline (ELAVIL) tablet 10 mg, Nightly  atorvastatin (LIPITOR) tablet 40 mg, Daily  carbidopa-levodopa (SINEMET)  MG per tablet 1 tablet, 4x Daily  clopidogrel (PLAVIX) tablet 75 mg, Daily  entacapone (COMTAN) tablet 200 mg, 4x Daily  gabapentin (NEURONTIN) capsule 100 mg, TID  isosorbide mononitrate (IMDUR) extended release tablet 30 mg, Daily  metoprolol tartrate (LOPRESSOR) tablet 12.5 mg, BID  sucralfate (CARAFATE) tablet 1 g, 4x Daily  tamsulosin (FLOMAX) capsule 0.4 mg, Daily        All medication charted and reviewed. CONSULTS      IP CONSULT TO CARDIOLOGY  IP CONSULT TO NEUROLOGY  IP CONSULT TO GI    ASSESSMENT/PLAN       Stone Landeros is a 79 y.o. male who presents with chest pain and epigastric pain    Chest pain / epigastric pain:  Cardiology consulted. Atypical chest pain - non cardiac in nature -Continue Plavix 75 mg, Lipitor 40 mg, Imdur 30 mg, Lopressor 25 mg and norvasc   No further cardiac work up needed , cardiology is signing off   Neurology consulted. Optimized Requip dose to 0.5 tid; tolerating it well so far; to continue same along with Sinemet IR 25/100 qid & Comtan 200 mg qid. For esophagitis; GI on board; to continue PPI. Neurology is signing off. GI consulted. No indications at this time for repeat endoscopy is most likely patient simply needs a longer course of treatment  Continue Protonix 40 mg twice daily 30 minutes before meals  Soft diet. Supplements 2-3 times daily until patient is able to swallow more food. Appreciate input and recommendations from neurology  Patient will need to follow-up in the GI clinic in 1 month with Dr. Terry Groves. No further recommendations at this time GI will sign off  Dysphagia:  Modified barium swallow: safe swallow for Easy to [employer] with thin liquids as evidenced by no overt s/s of aspiration noted with consistencies tested  small sips and bites, only feed when alert and awake and upright at 90 degrees for all PO intake. Solid consistency: Easy to Chew  Liquid consistency: Thin  Liquid administration via: Cup;Straw  Requires SLP Intervention: Yes  D/C Recommendations: Ongoing speech therapy is recommended at next level of care; Ongoing speech therapy is recommended during this hospitalization  Recommended Exercises:    Therapeutic Interventions: Diet tolerance monitoring  Patient ordering easy to chew food off regular menu, will continue to encourage adequate intake  Nausea/Vomiting/Abd pain  Recent CT with small fluid collection adjacent to gallbladder fossa  Recent cholecystectomy  Minimal improvement with zofran and protonix  CT abdomen pelvis from 3/5 without acute process, postsurgical seroma in gallbladder fossa and small hiatal hernia  Home care/needs status:  Nursing spoke with [facility] care worker. Patient has been referred for PT, OT and skilled nursing. So far, patient has not received PT/OT but has been seen by skilled nursing. Patient is scheduled to receive skilled nursing once a week for the next 7 weeks  [place] has not reported concerns for patient's ability to care for self or home conditions. Patient lives with son who works during the day. Case management working on options for long-term health facility placement. This in the setting of multiple return to the hospital following SNF discharge as an outpatient. Waiting for callback from [facility] to determine whether his insurance will cover further stays  Continue home medications and pain control  Monitor vitals, labs, and imaging  DISPO: pending consults and clinical improvement    --  Bhumika Marcelino DO  Emergency Medicine Resident Physician     This dictation was generated by voice recognition computer software. Although all attempts are made to edit the dictation for accuracy, there may be errors in the transcription that are not intended.

## 2023-03-07 NOTE — PROGRESS NOTES
Occupational Therapy  Facility/Department: [address] ORTHO/MED SURG  Occupational Therapy Daily Treatment Note    Name: Lemuel Richards  : 1940  MRN: [id number]  Date of Service: 3/7/2023    Discharge Recommendations:  Patient would benefit from continued therapy after discharge in order to increase pt balance, strength and independence         Assessment   Performance deficits / Impairments: Decreased functional mobility ; Decreased ADL status; Decreased strength;Decreased safe awareness;Decreased cognition;Decreased endurance;Decreased balance;Decreased high-level IADLs  Prognosis: Good  REQUIRES OT FOLLOW-UP: Yes  Activity Tolerance  Activity Tolerance: Patient Tolerated treatment well        Plan   Occupational Therapy Plan  Times Per Week: 3-5 x/wk  Current Treatment Recommendations: Strengthening, Balance training, Endurance training, Cognitive reorientation, Pain management, Functional mobility training, Safety education & training, Patient/Caregiver education & training, Equipment evaluation, education, & procurement, Self-Care / ADL, Home management training, Coordination training     Restrictions  Restrictions/Precautions  Restrictions/Precautions: Contact Precautions  Required Braces or Orthoses?: No  Position Activity Restriction  Other position/activity restrictions: up with assistance, Hx of parkinson's    Subjective   General  Chart Reviewed: Yes  Family / Caregiver Present: No  General Comment  Comments: Pt and RN agreeable to therapy this day. Pt supine in bed at start of session and retired to seated in chair at session end with call light in reach, chair alarm on and BLE elevated. Pt denied pain. Objective      Safety Devices  Type of Devices: Gait belt;Left in chair;Chair alarm in place;Call light within reach; Patient at risk for falls;Nurse notified  Restraints  Restraints Initially in Place: No  Balance  Sitting: Without support (Pt tolerated approx 15 min static/dynamic sitting unsupported at SBA)  Standing: With support (Min A d/t posterior lean and L knee buckling with RW. Pt tolerated approx 8 min required 1 seated rest break d/t fatigue and tolerated another 7 min. Crepitations noted in B knees throughout)  Gait  Overall Level of Assistance: Minimum assistance (EOB>bathroom>chair utilizing RW required Min A for L knee buckling)  Toilet Transfers  Toilet - Technique: Ambulating  Equipment Used: Standard bedside commode  Toilet Transfer: Contact guard assistance  Toilet Transfers Comments: utilizing RW     ADL  Grooming: Contact guard assistance;Setup;Verbal cueing; Increased time to complete  Grooming Skilled Clinical Factors: face shaving and face washing facilitated sitting/standing at sink with RW and 0-2 hand support demo post lean with 0 loss of   Additional Comments: Throughout session pt limited by difficulty attending to task, BLE buckling and decreased cognition. Pt required increased time for face shaving with 2 seated rest breaks d/t fatigue        Bed mobility  Supine to Sit: Stand by assistance  Scooting: Stand by assistance  Bed Mobility Comments: HOB elevated, utilizing bed rails  Transfers  Sit to stand: Contact guard assistance  Stand to sit: Contact guard assistance  Transfer Comments: utilizing RW requiring min v/c for proper hand placement     Cognition  Overall Cognitive Status: Exceptions  Following Commands: Follows multistep commands with repitition; Follows multistep commands with increased time  Safety Judgement: Decreased awareness of need for assistance;Decreased awareness of need for safety  Problem Solving: Decreased awareness of errors  Insights: Decreased awareness of deficits  Initiation: Requires cues for some  Cognition Comment: talkative/distractible requires mod-max redirection  Orientation  Overall Orientation Status: Within Functional Limits                  Education Given To: Patient  Education Provided: Role of Therapy;Transfer Training;Precautions  Education Provided Comments: proper hand and foot placement; balance maintaince; walker management-F carry over  Education Method: Verbal  Education Outcome: Continued education needed                 AM-PAC Score        AM-PAC Inpatient Daily Activity Raw Score: 19 (03/07/23 1458)  AM-PAC Inpatient ADL T-Scale Score : 40.22 (03/07/23 1458)  ADL Inpatient CMS 0-100% Score: 42.8 (03/07/23 1458)  ADL Inpatient CMS G-Code Modifier : CK (03/07/23 1458)        Goals  Short Term Goals  Time Frame for Short Term Goals: By discharge, pt will:  Short Term Goal 1: Demo SBA for functional transfers and functional mobility with use of LRD for engagement in ADLs/IADLs  Short Term Goal 2: Demo Mod I for UB ADLs with use of adaptive tech/AE PRN  Short Term Goal 3: Demo CGA for LB ADLs and toileting tasks with setup and AE PRN  Short Term Goal 4: Demo 8 min dynamic standing and reaching outside SHAB[place]IR with unilateral hand release and SBA for improved balance during ADL/IADL participation  Short Term Goal 5: Follow 75% of 3-step commands with appropriate initiation and sequencing for improved functional independence with daily occupations       Therapy Time   Individual Concurrent Group Co-treatment   Time In 1342         Time Out 1441         Minutes 59         Timed Code Treatment Minutes: MAVERICK Meyer/L

## 2023-03-07 NOTE — PLAN OF CARE
Problem: Chronic Conditions and Co-morbidities  Goal: Patient's chronic conditions and co-morbidity symptoms are monitored and maintained or improved  3/7/2023 1624 by Sarwat Womack RN  Outcome: Progressing  3/7/2023 0344 by Mackenzie Ferrer RN  Outcome: Progressing     Problem: Pain  Goal: Verbalizes/displays adequate comfort level or baseline comfort level  3/7/2023 1624 by Sarwat Womack RN  Outcome: Progressing  3/7/2023 0344 by Mackenzie Ferrer RN  Outcome: Progressing     Problem: Safety - Adult  Goal: Free from fall injury  3/7/2023 1624 by Sarwat Womack RN  Outcome: Progressing  3/7/2023 0344 by Mackenzie Ferrer RN  Outcome: Progressing     Problem: Discharge Planning  Goal: Discharge to home or other facility with appropriate resources  3/7/2023 1624 by Sarwat Womack RN  Outcome: Progressing  3/7/2023 0344 by Mackenzie Ferrer RN  Outcome: Progressing

## 2023-03-07 NOTE — PLAN OF CARE
Problem: Chronic Conditions and Co-morbidities  Goal: Patient's chronic conditions and co-morbidity symptoms are monitored and maintained or improved  3/7/2023 0344 by Sushma Law RN  Outcome: Progressing  3/6/2023 1429 by Juventino Sánchez RN  Outcome: Progressing     Problem: Pain  Goal: Verbalizes/displays adequate comfort level or baseline comfort level  3/7/2023 0344 by Sushma Law RN  Outcome: Progressing  3/6/2023 1429 by Juventino Sánchez RN  Outcome: Progressing     Problem: Safety - Adult  Goal: Free from fall injury  3/7/2023 0344 by Sushma Law RN  Outcome: Progressing  3/6/2023 1429 by Juventino Sánchez RN  Outcome: Progressing     Problem: Discharge Planning  Goal: Discharge to home or other facility with appropriate resources  3/7/2023 0344 by Sushma Law RN  Outcome: Progressing  3/6/2023 1429 by Juventino Sánchez RN  Outcome: Progressing

## 2023-03-07 NOTE — CARE COORDINATION
03/07/23 1145   Readmission Assessment   Number of Days since last admission? 8-30 days   Previous Disposition SNF   Who is being Interviewed Patient   What was the patient's/caregiver's perception as to why they think they needed to return back to the hospital? Other (Comment)  (Was at home, started having chest pain)   Did you visit your Primary Care Physician after you left the hospital, before you returned this time? No  (seen by MD at [facility])   Why weren't you able to visit your PCP? Other (Comment)  Angela Arellano by MD at [facility])   Did you see a specialist, such as Cardiac, Pulmonary, Orthopedic Physician, etc. after you left the hospital? No   Who advised the patient to return to the hospital? Self-referral   Does the patient report anything that got in the way of taking their medications? No   In our efforts to provide the best possible care to you and others like you, can you think of anything that we could have done to help you after you left the hospital the first time, so that you might not have needed to return so soon?  Other (Comment)  (none)

## 2023-03-07 NOTE — CARE COORDINATION
Transition planning  Received call from Kelly Jorgensen at [facility], she states they are OON with pt's insurance and he does not have any OON benefits. [phone] with patient, updated on above, patient states he does not have a preference of SNFs, [place] has friends in that area that can visit him. Reviewed SNF list, he is agreeable to referral to [address] [place] [address]- referrals sent. 1450 Received VM from Noemi Carolina at [facility], she states they are unable to accept patient. Updated patient, referrals sent to [facility] and [facility]. 1520 Received call from Grazyna Centeno at [facility], she states they are unable to accept patient. 1600 Received call from [address] [place] [address] at [address] [place] [address], she states they are able to accept patient, pending insurance verification.

## 2023-03-08 LAB
ANION GAP SERPL CALCULATED.3IONS-SCNC: 17 MMOL/L (ref 9–17)
BUN SERPL-MCNC: 11 MG/DL (ref 8–23)
CALCIUM SERPL-MCNC: 9.8 MG/DL (ref 8.6–10.4)
CHLORIDE SERPL-SCNC: 98 MMOL/L (ref 98–107)
CO2 SERPL-SCNC: 21 MMOL/L (ref 20–31)
CREAT SERPL-MCNC: 1.14 MG/DL (ref 0.7–1.2)
GFR SERPL CREATININE-BSD FRML MDRD: >60 ML/MIN/1.73M2
GLUCOSE SERPL-MCNC: 149 MG/DL (ref 70–99)
HCT VFR BLD AUTO: 46.9 % (ref 40.7–50.3)
HGB BLD-MCNC: 15.6 G/DL (ref 13–17)
MCH RBC QN AUTO: 31.7 PG (ref 25.2–33.5)
MCHC RBC AUTO-ENTMCNC: 33.3 G/DL (ref 28.4–34.8)
MCV RBC AUTO: 95.3 FL (ref 82.6–102.9)
NRBC AUTOMATED: 0 PER 100 WBC
PDW BLD-RTO: 14.1 % (ref 11.8–14.4)
PLATELET # BLD AUTO: 198 K/UL (ref 138–453)
PMV BLD AUTO: 9.2 FL (ref 8.1–13.5)
POTASSIUM SERPL-SCNC: 4.7 MMOL/L (ref 3.7–5.3)
RBC # BLD: 4.92 M/UL (ref 4.21–5.77)
SODIUM SERPL-SCNC: 136 MMOL/L (ref 135–144)
WBC # BLD AUTO: 5.9 K/UL (ref 3.5–11.3)

## 2023-03-08 PROCEDURE — 2580000003 HC RX 258

## 2023-03-08 PROCEDURE — 97530 THERAPEUTIC ACTIVITIES: CPT

## 2023-03-08 PROCEDURE — 6370000000 HC RX 637 (ALT 250 FOR IP): Performed by: STUDENT IN AN ORGANIZED HEALTH CARE EDUCATION/TRAINING PROGRAM

## 2023-03-08 PROCEDURE — 80048 BASIC METABOLIC PNL TOTAL CA: CPT

## 2023-03-08 PROCEDURE — 85027 COMPLETE CBC AUTOMATED: CPT

## 2023-03-08 PROCEDURE — 6360000002 HC RX W HCPCS

## 2023-03-08 PROCEDURE — C9113 INJ PANTOPRAZOLE SODIUM, VIA: HCPCS

## 2023-03-08 PROCEDURE — 1200000000 HC SEMI PRIVATE

## 2023-03-08 PROCEDURE — 6370000000 HC RX 637 (ALT 250 FOR IP)

## 2023-03-08 PROCEDURE — 36415 COLL VENOUS BLD VENIPUNCTURE: CPT

## 2023-03-08 RX ADMIN — SODIUM CHLORIDE, PRESERVATIVE FREE 40 MG: 5 INJECTION INTRAVENOUS at 08:50

## 2023-03-08 RX ADMIN — CARBIDOPA AND LEVODOPA 1 TABLET: 25; 100 TABLET ORAL at 17:02

## 2023-03-08 RX ADMIN — CARBIDOPA AND LEVODOPA 1 TABLET: 25; 100 TABLET ORAL at 22:01

## 2023-03-08 RX ADMIN — ROPINIROLE HYDROCHLORIDE 0.5 MG: 0.25 TABLET, FILM COATED ORAL at 22:01

## 2023-03-08 RX ADMIN — GABAPENTIN 100 MG: 100 CAPSULE ORAL at 22:01

## 2023-03-08 RX ADMIN — SODIUM CHLORIDE, PRESERVATIVE FREE 40 MG: 5 INJECTION INTRAVENOUS at 22:00

## 2023-03-08 RX ADMIN — ENTACAPONE 200 MG: 200 TABLET, FILM COATED ORAL at 08:49

## 2023-03-08 RX ADMIN — SUCRALFATE 1 G: 1 TABLET ORAL at 08:49

## 2023-03-08 RX ADMIN — GABAPENTIN 100 MG: 100 CAPSULE ORAL at 13:32

## 2023-03-08 RX ADMIN — ENTACAPONE 200 MG: 200 TABLET, FILM COATED ORAL at 17:02

## 2023-03-08 RX ADMIN — ROPINIROLE HYDROCHLORIDE 0.5 MG: 0.25 TABLET, FILM COATED ORAL at 13:32

## 2023-03-08 RX ADMIN — SODIUM CHLORIDE, PRESERVATIVE FREE 10 ML: 5 INJECTION INTRAVENOUS at 08:55

## 2023-03-08 RX ADMIN — METOPROLOL TARTRATE 12.5 MG: 25 TABLET ORAL at 22:01

## 2023-03-08 RX ADMIN — ISOSORBIDE MONONITRATE 30 MG: 30 TABLET, EXTENDED RELEASE ORAL at 10:03

## 2023-03-08 RX ADMIN — SUCRALFATE 1 G: 1 TABLET ORAL at 22:01

## 2023-03-08 RX ADMIN — SUCRALFATE 1 G: 1 TABLET ORAL at 13:32

## 2023-03-08 RX ADMIN — CARBIDOPA AND LEVODOPA 1 TABLET: 25; 100 TABLET ORAL at 08:49

## 2023-03-08 RX ADMIN — CARBIDOPA AND LEVODOPA 1 TABLET: 25; 100 TABLET ORAL at 13:32

## 2023-03-08 RX ADMIN — GABAPENTIN 100 MG: 100 CAPSULE ORAL at 08:49

## 2023-03-08 RX ADMIN — AMITRIPTYLINE HYDROCHLORIDE 10 MG: 10 TABLET, FILM COATED ORAL at 22:01

## 2023-03-08 RX ADMIN — DESMOPRESSIN ACETATE 40 MG: 0.2 TABLET ORAL at 08:49

## 2023-03-08 RX ADMIN — TAMSULOSIN HYDROCHLORIDE 0.4 MG: 0.4 CAPSULE ORAL at 08:49

## 2023-03-08 RX ADMIN — ENOXAPARIN SODIUM 40 MG: 100 INJECTION SUBCUTANEOUS at 08:50

## 2023-03-08 RX ADMIN — CLOPIDOGREL 75 MG: 75 TABLET, FILM COATED ORAL at 08:49

## 2023-03-08 RX ADMIN — ACETAMINOPHEN 650 MG: 325 TABLET ORAL at 08:49

## 2023-03-08 RX ADMIN — ROPINIROLE HYDROCHLORIDE 0.5 MG: 0.25 TABLET, FILM COATED ORAL at 08:49

## 2023-03-08 RX ADMIN — AMLODIPINE BESYLATE 5 MG: 5 TABLET ORAL at 10:03

## 2023-03-08 RX ADMIN — ENTACAPONE 200 MG: 200 TABLET, FILM COATED ORAL at 13:32

## 2023-03-08 RX ADMIN — ENTACAPONE 200 MG: 200 TABLET, FILM COATED ORAL at 22:01

## 2023-03-08 RX ADMIN — SUCRALFATE 1 G: 1 TABLET ORAL at 17:02

## 2023-03-08 ASSESSMENT — PAIN DESCRIPTION - ORIENTATION: ORIENTATION: LEFT

## 2023-03-08 ASSESSMENT — PAIN DESCRIPTION - LOCATION: LOCATION: LEG

## 2023-03-08 ASSESSMENT — PAIN DESCRIPTION - DESCRIPTORS: DESCRIPTORS: ACHING

## 2023-03-08 ASSESSMENT — PAIN DESCRIPTION - FREQUENCY: FREQUENCY: CONTINUOUS

## 2023-03-08 ASSESSMENT — PAIN - FUNCTIONAL ASSESSMENT: PAIN_FUNCTIONAL_ASSESSMENT: PREVENTS OR INTERFERES SOME ACTIVE ACTIVITIES AND ADLS

## 2023-03-08 ASSESSMENT — PAIN DESCRIPTION - ONSET: ONSET: ON-GOING

## 2023-03-08 ASSESSMENT — PAIN SCALES - GENERAL: PAINLEVEL_OUTOF10: 3

## 2023-03-08 NOTE — PROGRESS NOTES
OBS/CDU   Attending NOTE      Patients PCP is:  JW Sims CNP        SUBJECTIVE      Patient with difficulty with GI issues and compliance with medications. Patient handling p.o. well today. Patient had intermittent abdominal pain last night. Patient has otherwise been feeling well. Excepted at [facility]. PHYSICAL EXAM      General: NAD, AO X 3  Heent: EMOI, PERRL  Neck: SUPPLE, NO JVD  Cardiovascular: RRR, S1S2  Pulmonary: CTAB, NO SOB  Abdomen: SOFT, NTTP, ND, +BS  Extremities: +2/4 PULSES DISTAL, NO SWELLING  Neuro / Psych: NO NUMBNESS OR TINGLING, MENTATION AT BASELINE    PERTINENT TEST /EXAMS      I have reviewed all available laboratory results. MEDICATIONS CURRENT   pantoprazole (PROTONIX) 40 mg in sodium chloride (PF) 0.9 % 10 mL injection, BID  amLODIPine (NORVASC) tablet 5 mg, Daily  rOPINIRole (REQUIP) tablet 0.5 mg, TID  sodium chloride flush 0.9 % injection 5-40 mL, 2 times per day  sodium chloride flush 0.9 % injection 5-40 mL, PRN  0.9 % sodium chloride infusion, PRN  enoxaparin (LOVENOX) injection 40 mg, Daily  ondansetron (ZOFRAN-ODT) disintegrating tablet 4 mg, Q8H PRN   Or  ondansetron (ZOFRAN) injection 4 mg, Q6H PRN  polyethylene glycol (GLYCOLAX) packet 17 g, Daily PRN  acetaminophen (TYLENOL) tablet 650 mg, Q6H PRN   Or  acetaminophen (TYLENOL) suppository 650 mg, Q6H PRN  amitriptyline (ELAVIL) tablet 10 mg, Nightly  atorvastatin (LIPITOR) tablet 40 mg, Daily  carbidopa-levodopa (SINEMET)  MG per tablet 1 tablet, 4x Daily  clopidogrel (PLAVIX) tablet 75 mg, Daily  entacapone (COMTAN) tablet 200 mg, 4x Daily  gabapentin (NEURONTIN) capsule 100 mg, TID  isosorbide mononitrate (IMDUR) extended release tablet 30 mg, Daily  metoprolol tartrate (LOPRESSOR) tablet 12.5 mg, BID  sucralfate (CARAFATE) tablet 1 g, 4x Daily  tamsulosin (FLOMAX) capsule 0.4 mg, Daily        All medication charted and reviewed.     CONSULTS      IP CONSULT TO CARDIOLOGY  IP CONSULT TO NEUROLOGY  IP CONSULT TO GI    ASSESSMENT/PLAN       Jonh Anderson is a 79 y.o. male who presents with epigastric and chest pain. Epigastric chest pain. Evaluated by cardiology. Not felt to be cardiac in nature. Continuing Plavix Lipitor and Imdur. No further need for cardiac work-up per cardiology service  Neurology consult  History of Parkinson's  Optimize medications  No new neurologic deficits  Patient with controlled symptoms  Neurology signing off  GI consult. No need for repeat endoscopy. Continue PPI and soft diet  Supplements  Maintain bowel regimen  Further work-up as outpatient. Dysphagia  Seen by speech therapy  Safe to swallow for easy to chew diet. Nausea vomiting and abdominal discomfort  Supportive therapy  Successfully being treated  Patient with multiple ED returns secondary to poor compliance at home. Placement. See case management notes. Continue home medications and pain control  Monitor vitals, labs, and imaging  DISPO: pending consults and clinical improvement    --  Selene De Guzman MD  Emergency Medicine Attending Physician     This dictation was generated by voice recognition computer software. Although all attempts are made to edit the dictation for accuracy, there may be errors in the transcription that are not intended.

## 2023-03-08 NOTE — PLAN OF CARE
Problem: Chronic Conditions and Co-morbidities  Goal: Patient's chronic conditions and co-morbidity symptoms are monitored and maintained or improved  3/8/2023 0303 by Sobia Nuno RN  Outcome: Progressing  3/7/2023 1624 by Emily Arias RN  Outcome: Progressing     Problem: Pain  Goal: Verbalizes/displays adequate comfort level or baseline comfort level  3/8/2023 0303 by Sobia Nuno RN  Outcome: Progressing  3/7/2023 1624 by Emily Arias RN  Outcome: Progressing     Problem: Safety - Adult  Goal: Free from fall injury  3/8/2023 0303 by Sobia Nuno RN  Outcome: Progressing  3/7/2023 1624 by Emily Arias RN  Outcome: Progressing     Problem: Discharge Planning  Goal: Discharge to home or other facility with appropriate resources  3/8/2023 0303 by Sobia Nuno RN  Outcome: Progressing  3/7/2023 1624 by Emily Arias RN  Outcome: Progressing

## 2023-03-08 NOTE — PROGRESS NOTES
OBS/CDU   Attending NOTE      Patients PCP is:  JW Gibbons - FELIPE        SUBJECTIVE    Patient with effectively no complaints today. Patient doing well. General: NAD, AO X 3  Heent: EMOI, PERRL  Neck: SUPPLE, NO JVD  Cardiovascular: RRR, S1S2  Pulmonary: CTAB, NO SOB  Abdomen: SOFT, NTTP, ND, +BS  Extremities: +2/4 PULSES DISTAL, NO SWELLING  Neuro / Psych: NO NUMBNESS OR TINGLING, MENTATION AT BASELINE    PERTINENT TEST /EXAMS      I have reviewed all available laboratory results. MEDICATIONS CURRENT   pantoprazole (PROTONIX) 40 mg in sodium chloride (PF) 0.9 % 10 mL injection, BID  amLODIPine (NORVASC) tablet 5 mg, Daily  rOPINIRole (REQUIP) tablet 0.5 mg, TID  sodium chloride flush 0.9 % injection 5-40 mL, 2 times per day  sodium chloride flush 0.9 % injection 5-40 mL, PRN  0.9 % sodium chloride infusion, PRN  enoxaparin (LOVENOX) injection 40 mg, Daily  ondansetron (ZOFRAN-ODT) disintegrating tablet 4 mg, Q8H PRN   Or  ondansetron (ZOFRAN) injection 4 mg, Q6H PRN  polyethylene glycol (GLYCOLAX) packet 17 g, Daily PRN  acetaminophen (TYLENOL) tablet 650 mg, Q6H PRN   Or  acetaminophen (TYLENOL) suppository 650 mg, Q6H PRN  amitriptyline (ELAVIL) tablet 10 mg, Nightly  atorvastatin (LIPITOR) tablet 40 mg, Daily  carbidopa-levodopa (SINEMET)  MG per tablet 1 tablet, 4x Daily  clopidogrel (PLAVIX) tablet 75 mg, Daily  entacapone (COMTAN) tablet 200 mg, 4x Daily  gabapentin (NEURONTIN) capsule 100 mg, TID  isosorbide mononitrate (IMDUR) extended release tablet 30 mg, Daily  metoprolol tartrate (LOPRESSOR) tablet 12.5 mg, BID  sucralfate (CARAFATE) tablet 1 g, 4x Daily  tamsulosin (FLOMAX) capsule 0.4 mg, Daily      All medication charted and reviewed. CONSULTS      IP CONSULT TO CARDIOLOGY  IP CONSULT TO NEUROLOGY  IP CONSULT TO GI    ASSESSMENT/PLAN       Jimmy Landry is a 79 y.o. male who presents with epigastric and chest pain. Epigastric chest pain.   Cardiology signed off  Neurology consult  Patient has had maximized medications. No further need for neurology intervention  GI consult. GI sign off  Dysphagia  Seen by speech therapy  Safe to swallow for easy to chew diet. Patient with multiple ED returns secondary to poor compliance at home. Placement. See case management notes. Continue home medications and pain control  Monitor vitals, labs, and imaging  DISPO: pending consults and clinical improvement    --  Faustino Crowe MD  Emergency Medicine Attending Physician     This dictation was generated by voice recognition computer software. Although all attempts are made to edit the dictation for accuracy, there may be errors in the transcription that are not intended.

## 2023-03-08 NOTE — PROGRESS NOTES
Physical Therapy  Facility/Department: [address] ORTHO/MED SURG  Physical Therapy Treatment note    Name: Volodymyr Murray  : 1940  MRN: [id number]  Date of Service: 3/8/2023    Discharge Recommendations:  Therapy recommended at discharge, Patient would benefit from continued therapy after discharge   PT Equipment Recommendations  Equipment Needed: Yes  Mobility Devices: Cynthea Byers: Rolling      Patient Diagnosis(es): The primary encounter diagnosis was Chest pain, unspecified type. Diagnoses of Abdominal pain, epigastric and Dysphagia, unspecified type were also pertinent to this visit. 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 Past Surgical History:  has a past surgical history that includes Colonoscopy (2012); shoulder surgery (Right); pr colsc flx w/rmvl of tumor polyp lesion snare tq (N/A, 2017); Colonoscopy (2017); Cholecystectomy, laparoscopic (N/A, 10/29/2022); Esophagogastroduodenoscopy (2023); and Upper gastrointestinal endoscopy (N/A, 2023). Assessment    The pt ambulated 50 ft with a  RW x CGA. The walker seems to give him more stability than the cane that he has been using.  He could benefit from a continuation of PT for gait and strengthening following his DC  Activity Tolerance  Activity Tolerance: Patient limited by fatigue;Patient limited by endurance     Plan   Physcial Therapy Plan  General Plan: 5-7 times per week  Current Treatment Recommendations: Strengthening, Balance training, Functional mobility training, Transfer training, Endurance training, Gait training, Safety education & training, Patient/Caregiver education & training, Positioning, Equipment evaluation, education, & procurement, Therapeutic activities  Safety Devices  Type of Devices: Gait belt, Left in chair, Chair alarm in place, Call light within reach, Patient at risk for falls, Nurse notified  Restraints  Restraints Initially in Place: No     Restrictions  Restrictions/Precautions  Restrictions/Precautions: Contact Precautions  Required Braces or Orthoses?: No  Position Activity Restriction  Other position/activity restrictions: up with assistance, Hx of parkinson's     Subjective    The pt reports a 6/10 pain in his c spine     Social/Functional History  Social/Functional History  Lives With: Alone  Type of Home: House  Home Layout: Two level, Able to Live on Main level with bedroom/bathroom  Home Access: Stairs to enter with rails  Entrance Stairs - Number of Steps: 5  Entrance Stairs - Rails: Both  Bathroom Shower/Tub: Tub/Shower unit  Bathroom Toilet: Standard  Bathroom Equipment: Shower chair, Hand-held shower  Bathroom Accessibility: Accessible  Home Equipment: Rosea Mon, [address] bed (pt reports using str cane for majority of functional mobility)  Has the patient had two or more falls in the past year or any fall with injury in the past year?: Yes  Receives Help From: Friend(s)  ADL Assistance: Independent  Bath: Moderate assistance  Dressing: Moderate assistance  Grooming: Moderate assistance  Feeding: Moderate assistance  Toileting: Needs assistance  Homemaking Assistance: Needs assistance (pt reports friends complete homemaking tasks)  Meal Prep: Total  Laundry: Total  Vacuuming: Total  Cleaning: Total  Gardening: Total  Yard Work: Total  Driving: Total  Shopping:  Total  Homemaking Responsibilities: No  Ambulation Assistance: Independent  Transfer Assistance: Independent  Active : No  Mode of Transportation:  (medical cab)  Occupation: Retired  Additional Comments: Pt was recently at [employer]. Pt reports neighbors are able to provide prn assist upon discharge. Vision/Hearing       Cognition   Orientation  Overall Orientation Status: Within Normal Limits  Orientation Level: Oriented X4  Cognition  Overall Cognitive Status: WFL     Objective   Heart Rate: 50  Heart Rate Source: Monitor  BP: 129/72  BP Location: Right upper arm  BP Method: Automatic  Patient Position: Semi fowlers  MAP (Calculated): 91  Resp: 18  SpO2: 99 %  O2 Device: None (Room air)   Amb 50 ft x 2 with a RW x CGA      A/AROM Exercises: Seated exercises with BLEs x 10 reps each: ankle pumps, marches, hip abduction/adduction, and LAQs      OutComes Score  AM-PAC Score   AM-PAC Inpatient Mobility Raw Score : 17 (03/04/23 1306)  AM-PAC Inpatient T-Scale Score : 42.13 (03/04/23 1306)  Mobility Inpatient CMS 0-100% Score: 50.57 (03/04/23 1306)  Mobility Inpatient CMS G-Code Modifier : CK (03/04/23 1306)     Tinneti Score     Goals  Short Term Goals  Time Frame for Short Term Goals: 5 to 7 visits  Short Term Goal 1: Pt to demonstrate transers with rolling walker at SBA  Short Term Goal 2: Pt able to ambualte with rolling walker distance of 50 ft x 2, SBA  Short Term Goal 3: Pt to demonstrate mod-I bed mobility with bed rail. Short Term Goal 4: Pt to demonstrate improve standing balance to fair during fucntional mobilityand reaching/bending task, to reduce fall risk.        Education  Patient Education  Education Given To: Patient  Education Provided: Role of Therapy;Plan of Care;Precautions;Transfer Training  Education Method: Demonstration;Verbal  Barriers to Learning: None  Education Outcome: Verbalized understanding;Demonstrated understanding    Therapy Time   Individual Concurrent Group Co-treatment   Time In 0935         Time Out 1000         Minutes 25 Nancy Brower, PT

## 2023-03-08 NOTE — CARE COORDINATION
Transition planning  Received call from [facility] at [address] [place] [address], she states they are able to accept and will start precert today.  Patient updated

## 2023-03-09 LAB — GLUCOSE BLD-MCNC: 130 MG/DL (ref 75–110)

## 2023-03-09 PROCEDURE — 6370000000 HC RX 637 (ALT 250 FOR IP)

## 2023-03-09 PROCEDURE — 2580000003 HC RX 258

## 2023-03-09 PROCEDURE — 6370000000 HC RX 637 (ALT 250 FOR IP): Performed by: STUDENT IN AN ORGANIZED HEALTH CARE EDUCATION/TRAINING PROGRAM

## 2023-03-09 PROCEDURE — 6360000002 HC RX W HCPCS

## 2023-03-09 PROCEDURE — C9113 INJ PANTOPRAZOLE SODIUM, VIA: HCPCS

## 2023-03-09 PROCEDURE — 82947 ASSAY GLUCOSE BLOOD QUANT: CPT

## 2023-03-09 PROCEDURE — 1200000000 HC SEMI PRIVATE

## 2023-03-09 RX ADMIN — GABAPENTIN 100 MG: 100 CAPSULE ORAL at 08:21

## 2023-03-09 RX ADMIN — AMITRIPTYLINE HYDROCHLORIDE 10 MG: 10 TABLET, FILM COATED ORAL at 20:11

## 2023-03-09 RX ADMIN — ENOXAPARIN SODIUM 40 MG: 100 INJECTION SUBCUTANEOUS at 08:22

## 2023-03-09 RX ADMIN — ENTACAPONE 200 MG: 200 TABLET, FILM COATED ORAL at 20:11

## 2023-03-09 RX ADMIN — SUCRALFATE 1 G: 1 TABLET ORAL at 16:52

## 2023-03-09 RX ADMIN — CLOPIDOGREL 75 MG: 75 TABLET, FILM COATED ORAL at 08:21

## 2023-03-09 RX ADMIN — SODIUM CHLORIDE, PRESERVATIVE FREE 10 ML: 5 INJECTION INTRAVENOUS at 20:12

## 2023-03-09 RX ADMIN — CARBIDOPA AND LEVODOPA 1 TABLET: 25; 100 TABLET ORAL at 13:51

## 2023-03-09 RX ADMIN — SUCRALFATE 1 G: 1 TABLET ORAL at 08:21

## 2023-03-09 RX ADMIN — SODIUM CHLORIDE, PRESERVATIVE FREE 10 ML: 5 INJECTION INTRAVENOUS at 08:27

## 2023-03-09 RX ADMIN — ROPINIROLE HYDROCHLORIDE 0.5 MG: 0.25 TABLET, FILM COATED ORAL at 20:11

## 2023-03-09 RX ADMIN — CARBIDOPA AND LEVODOPA 1 TABLET: 25; 100 TABLET ORAL at 08:21

## 2023-03-09 RX ADMIN — ENTACAPONE 200 MG: 200 TABLET, FILM COATED ORAL at 08:21

## 2023-03-09 RX ADMIN — ROPINIROLE HYDROCHLORIDE 0.5 MG: 0.25 TABLET, FILM COATED ORAL at 08:21

## 2023-03-09 RX ADMIN — SODIUM CHLORIDE, PRESERVATIVE FREE 40 MG: 5 INJECTION INTRAVENOUS at 08:22

## 2023-03-09 RX ADMIN — CARBIDOPA AND LEVODOPA 1 TABLET: 25; 100 TABLET ORAL at 20:11

## 2023-03-09 RX ADMIN — SODIUM CHLORIDE, PRESERVATIVE FREE 40 MG: 5 INJECTION INTRAVENOUS at 20:12

## 2023-03-09 RX ADMIN — AMLODIPINE BESYLATE 5 MG: 5 TABLET ORAL at 08:21

## 2023-03-09 RX ADMIN — SUCRALFATE 1 G: 1 TABLET ORAL at 13:51

## 2023-03-09 RX ADMIN — ISOSORBIDE MONONITRATE 30 MG: 30 TABLET, EXTENDED RELEASE ORAL at 08:21

## 2023-03-09 RX ADMIN — GABAPENTIN 100 MG: 100 CAPSULE ORAL at 20:11

## 2023-03-09 RX ADMIN — CARBIDOPA AND LEVODOPA 1 TABLET: 25; 100 TABLET ORAL at 16:52

## 2023-03-09 RX ADMIN — SUCRALFATE 1 G: 1 TABLET ORAL at 20:11

## 2023-03-09 RX ADMIN — ROPINIROLE HYDROCHLORIDE 0.5 MG: 0.25 TABLET, FILM COATED ORAL at 13:51

## 2023-03-09 RX ADMIN — DESMOPRESSIN ACETATE 40 MG: 0.2 TABLET ORAL at 08:21

## 2023-03-09 RX ADMIN — ENTACAPONE 200 MG: 200 TABLET, FILM COATED ORAL at 16:52

## 2023-03-09 RX ADMIN — ENTACAPONE 200 MG: 200 TABLET, FILM COATED ORAL at 13:51

## 2023-03-09 RX ADMIN — TAMSULOSIN HYDROCHLORIDE 0.4 MG: 0.4 CAPSULE ORAL at 08:21

## 2023-03-09 RX ADMIN — GABAPENTIN 100 MG: 100 CAPSULE ORAL at 13:51

## 2023-03-09 ASSESSMENT — PAIN SCALES - GENERAL
PAINLEVEL_OUTOF10: 3
PAINLEVEL_OUTOF10: 2

## 2023-03-09 ASSESSMENT — PAIN DESCRIPTION - ORIENTATION: ORIENTATION: LEFT

## 2023-03-09 ASSESSMENT — PAIN DESCRIPTION - FREQUENCY: FREQUENCY: CONTINUOUS

## 2023-03-09 ASSESSMENT — PAIN DESCRIPTION - ONSET: ONSET: ON-GOING

## 2023-03-09 ASSESSMENT — PAIN DESCRIPTION - DESCRIPTORS: DESCRIPTORS: ACHING

## 2023-03-09 ASSESSMENT — PAIN DESCRIPTION - LOCATION: LOCATION: LEG

## 2023-03-09 ASSESSMENT — PAIN - FUNCTIONAL ASSESSMENT: PAIN_FUNCTIONAL_ASSESSMENT: ACTIVITIES ARE NOT PREVENTED

## 2023-03-09 NOTE — DISCHARGE INSTRUCTIONS
You are hospitalized for a short time due to significant abdominal pain, nausea, vomiting and concern for the ability to take care of yourself at home. Your symptoms resolved while you are in the hospital.  You were medically cleared and a short-term nursing facility was approved for your discharge to help you recover and get your strength back. Your metoprolol was discontinued and you were started on Norvasc. You should continue taking your medications as previously prescribed. If you do begin to have worsening abdominal pain, nausea, vomiting, you should return to the emergency department for further evaluation.

## 2023-03-09 NOTE — PROGRESS NOTES
Physician Progress Note      Stevenson Lehman  CSN #:                  [id number]  :                       1940  ADMIT DATE:       3/2/2023 1:02 PM  [address] [place] DATE:  Stephanie Jaffe  PROVIDER #:        Louis Shetty MD          QUERY TEXT:    Pt admitted with failure to thrive. Pt noted to have dysphagia, vomiting,   Parkinson's disease with increase in symptoms of  Pill rolling tremor noticed   in the b/l hand (R>L),  Patient has rigidity in the upper and lower extremity,   Patient has decreased hand tapping speed in the right hand as compared to the   left., multiple admission following discharges for ECF rehabs. If possible,   please document in progress notes and discharge summary the relationship, if   any, between dysphagia and Parkinson's disease. The medical record reflects the following:  Risk Factors: multiple readmission following dc form ECF . lives with Son,   Parkinson's disease  Clinical Indicators: s/s-  Pill rolling tremor noticed in the b/l hand (R>L),    Patient has rigidity in the upper and lower extremity,  Patient has decreased   hand tapping speed in the right hand as compared to the left. GI- ongoing tx   for known esophageal ulcer- no bleeding. A&O x3.  Pt c/o vomiting as reason   for call to EMS. IP status order for adult failure to thrive. Treatment: Neurology consult with recommendations to adjust medications -   Regarding parkinsonism: We will optimize the dose of Requip from 0.25 tid to   0.5 tid and to continue Sinemet IR 25/100 qid along with Comtan 200 mg qid. .    Modified barium swallow study 3/3-Modified barium swallow: safe swallow for   Easy to [employer] with thin liquids as evidenced by no overt s/s of   aspiration noted with consistencies tested small sips and bites, only feed   when alert and awake and upright at 90 degrees for all PO intake. Solid   consistency: Easy to Chew.  Liquid consistency: Thin, Liquid    Thank you, please contact me for any questions. Jesus Manuel Baum RN, CDS  cell- [phone]  office hours - [phone]  Options provided:  -- failure to thrive due to Parkinson's disease  -- failure to thrive due to Parkinson's disease and Esophagitis  -- failure to thrive unrelated to Parkinson's disease but due to Esophagitis  -- Other - I will add my own diagnosis  -- Disagree - Not applicable / Not valid  -- Disagree - Clinically unable to determine / Unknown  -- Refer to Clinical Documentation Reviewer    PROVIDER RESPONSE TEXT:    This patient has failure to thrive due to Parkinson's disease and Esophagitis.     Query created by: Celso Rodriguez on 3/6/2023 7:31 AM      Electronically signed by:  Marlene Benson MD 3/9/2023 9:18 AM

## 2023-03-09 NOTE — PROGRESS NOTES
[address]  CDU / OBSERVATION ENCOUNTER  ATTENDING NOTE       I performed a history and physical examination of the patient and discussed management with the resident or midlevel provider. I reviewed the resident or midlevel provider's note and agree with the documented findings and plan of care. Any areas of disagreement are noted on the chart. I was personally present for the key portions of any procedures. I have documented in the chart those procedures where I was not present during the key portions. I have reviewed the nurses notes. I agree with the chief complaint, past medical history, past surgical history, allergies, medications, social and family history as documented unless otherwise noted below. The Family history, social history, and ROS are effectively unchanged since admission unless noted elsewhere in the chart. This patient was placed in the observation unit for reevaluation for possible admission to the hospital    Patient resting comfortably today. Patient noted once again to have an asymptomatic bradycardia when sleeping. Patient's heart rate has been somewhat slower since being in the hospital.  Will discontinue beta-blocker for now. Patient not symptomatic but beta-blocker has been held secondary to low heart rates. Patient currently awaiting ECF placement. Paperwork done.   We will refresh JOVANNA and work with case management to get patient to extended care facility    Maxine Castañeda MD  Attending Emergency  Physician

## 2023-03-10 PROCEDURE — 6370000000 HC RX 637 (ALT 250 FOR IP): Performed by: STUDENT IN AN ORGANIZED HEALTH CARE EDUCATION/TRAINING PROGRAM

## 2023-03-10 PROCEDURE — 97110 THERAPEUTIC EXERCISES: CPT

## 2023-03-10 PROCEDURE — 6360000002 HC RX W HCPCS

## 2023-03-10 PROCEDURE — 97535 SELF CARE MNGMENT TRAINING: CPT

## 2023-03-10 PROCEDURE — 1200000000 HC SEMI PRIVATE

## 2023-03-10 PROCEDURE — 2580000003 HC RX 258

## 2023-03-10 PROCEDURE — C9113 INJ PANTOPRAZOLE SODIUM, VIA: HCPCS

## 2023-03-10 PROCEDURE — 6370000000 HC RX 637 (ALT 250 FOR IP)

## 2023-03-10 PROCEDURE — 97116 GAIT TRAINING THERAPY: CPT

## 2023-03-10 RX ADMIN — SODIUM CHLORIDE, PRESERVATIVE FREE 40 MG: 5 INJECTION INTRAVENOUS at 08:40

## 2023-03-10 RX ADMIN — ENTACAPONE 200 MG: 200 TABLET, FILM COATED ORAL at 08:40

## 2023-03-10 RX ADMIN — CARBIDOPA AND LEVODOPA 1 TABLET: 25; 100 TABLET ORAL at 08:40

## 2023-03-10 RX ADMIN — ROPINIROLE HYDROCHLORIDE 0.5 MG: 0.25 TABLET, FILM COATED ORAL at 08:39

## 2023-03-10 RX ADMIN — CLOPIDOGREL 75 MG: 75 TABLET, FILM COATED ORAL at 08:40

## 2023-03-10 RX ADMIN — ENTACAPONE 200 MG: 200 TABLET, FILM COATED ORAL at 13:17

## 2023-03-10 RX ADMIN — GABAPENTIN 100 MG: 100 CAPSULE ORAL at 08:39

## 2023-03-10 RX ADMIN — GABAPENTIN 100 MG: 100 CAPSULE ORAL at 21:33

## 2023-03-10 RX ADMIN — TAMSULOSIN HYDROCHLORIDE 0.4 MG: 0.4 CAPSULE ORAL at 08:40

## 2023-03-10 RX ADMIN — ROPINIROLE HYDROCHLORIDE 0.5 MG: 0.25 TABLET, FILM COATED ORAL at 21:34

## 2023-03-10 RX ADMIN — ENTACAPONE 200 MG: 200 TABLET, FILM COATED ORAL at 17:33

## 2023-03-10 RX ADMIN — SODIUM CHLORIDE, PRESERVATIVE FREE 40 MG: 5 INJECTION INTRAVENOUS at 21:33

## 2023-03-10 RX ADMIN — ENOXAPARIN SODIUM 40 MG: 100 INJECTION SUBCUTANEOUS at 08:41

## 2023-03-10 RX ADMIN — ENTACAPONE 200 MG: 200 TABLET, FILM COATED ORAL at 21:41

## 2023-03-10 RX ADMIN — SUCRALFATE 1 G: 1 TABLET ORAL at 08:40

## 2023-03-10 RX ADMIN — GABAPENTIN 100 MG: 100 CAPSULE ORAL at 13:17

## 2023-03-10 RX ADMIN — SUCRALFATE 1 G: 1 TABLET ORAL at 17:33

## 2023-03-10 RX ADMIN — ISOSORBIDE MONONITRATE 30 MG: 30 TABLET, EXTENDED RELEASE ORAL at 08:39

## 2023-03-10 RX ADMIN — CARBIDOPA AND LEVODOPA 1 TABLET: 25; 100 TABLET ORAL at 17:33

## 2023-03-10 RX ADMIN — CARBIDOPA AND LEVODOPA 1 TABLET: 25; 100 TABLET ORAL at 13:17

## 2023-03-10 RX ADMIN — ACETAMINOPHEN 650 MG: 325 TABLET ORAL at 17:31

## 2023-03-10 RX ADMIN — SODIUM CHLORIDE, PRESERVATIVE FREE 10 ML: 5 INJECTION INTRAVENOUS at 21:35

## 2023-03-10 RX ADMIN — SUCRALFATE 1 G: 1 TABLET ORAL at 21:33

## 2023-03-10 RX ADMIN — DESMOPRESSIN ACETATE 40 MG: 0.2 TABLET ORAL at 08:39

## 2023-03-10 RX ADMIN — CARBIDOPA AND LEVODOPA 1 TABLET: 25; 100 TABLET ORAL at 21:34

## 2023-03-10 RX ADMIN — SUCRALFATE 1 G: 1 TABLET ORAL at 13:17

## 2023-03-10 RX ADMIN — AMLODIPINE BESYLATE 5 MG: 5 TABLET ORAL at 08:40

## 2023-03-10 RX ADMIN — AMITRIPTYLINE HYDROCHLORIDE 10 MG: 10 TABLET, FILM COATED ORAL at 21:34

## 2023-03-10 RX ADMIN — SODIUM CHLORIDE, PRESERVATIVE FREE 10 ML: 5 INJECTION INTRAVENOUS at 08:51

## 2023-03-10 RX ADMIN — ROPINIROLE HYDROCHLORIDE 0.5 MG: 0.25 TABLET, FILM COATED ORAL at 13:16

## 2023-03-10 ASSESSMENT — PAIN DESCRIPTION - LOCATION: LOCATION: HEAD

## 2023-03-10 ASSESSMENT — PAIN SCALES - GENERAL: PAINLEVEL_OUTOF10: 3

## 2023-03-10 ASSESSMENT — PAIN DESCRIPTION - DESCRIPTORS: DESCRIPTORS: ACHING

## 2023-03-10 NOTE — CARE COORDINATION
Transition planning  Received call from [address] [place] [address] at [address] [place] [address], she states insurance has denied patient for SNF. There is a P2P available. Number to call is [phone], ref # [id number]. Needs to be completed by 3- at 12:00 pm.  PS Dr. Masood Barbour with P2P information as stated above.

## 2023-03-10 NOTE — PROGRESS NOTES
Comprehensive Nutrition Assessment    Type and Reason for Visit:  Initial (LOS)    Nutrition Recommendations/Plan:   Continue current diet. Encourage/monitor PO intakes as tolerated. Will monitor need for ONS. Monitor labs, weights, and plan of care. Malnutrition Assessment:  Malnutrition Status: At risk for malnutrition (03/10/23 7812)    Context:  Acute Illness     Findings of the 6 clinical characteristics of malnutrition:  Energy Intake:  Mild decrease in energy intake   Weight Loss:  No significant weight loss     Body Fat Loss:  Mild body fat loss Orbital   Muscle Mass Loss:  No significant muscle mass loss  Fluid Accumulation:  No significant fluid accumulation   Strength:  Not Performed    Nutrition Assessment:    Chart reviewed/pt seen for length of stay. Admitted with c/o chest pain and painful swallowing. PMH: CKD, GERD, Parkinson's. Pt reports having some odynophagia and dysphagia with solids and liquids upon admission. S/p video swallow study on 3/3 which pt passed for an easy to chew diet with thin liquids. Pt states this morning he had some Beninese toast, jauregui, and potatoes but then had to stop eating as his stomach was upset. Per chart review, recorded PO intakes of % noted. Pt reports  lb and states he thinks his weight has been stable. Labs/Meds reviewed. Nutrition Related Findings:    Labs/Meds reviewed. C/o nausea and heartburn. Last BM 3/9. Wound Type: None       Current Nutrition Intake & Therapies:    Average Meal Intake: 51-75%, %  Average Supplements Intake: None Ordered  ADULT DIET; Regular  Additional Calorie Sources:  None    Anthropometric Measures:  Height: 5' 7\" (170.2 cm)  Ideal Body Weight (IBW): 148 lbs (67 kg)    Admission Body Weight: 179 lb (81.2 kg)  Current Body Weight: 179 lb (81.2 kg), 120.9 % IBW.  Weight Source: Stated  Current BMI (kg/m2): 28  Usual Body Weight: 177 lb 7.5 oz (80.5 kg) (12/4/22 bed scale per chart review - pt reports  lb)  % Weight Change (Calculated): 0.9                    BMI Categories: Overweight (BMI 25.0-29. 9)    Estimated Daily Nutrient Needs:  Energy Requirements Based On: Kcal/kg  Weight Used for Energy Requirements: Admission  Energy (kcal/day): 8670-3012 kcals/day  Weight Used for Protein Requirements: Ideal  Protein (g/day): 80 gm pro/day  Method Used for Fluid Requirements: 1 ml/kcal  Fluid (ml/day): 1003-4659 mL/day or per MD    Nutrition Diagnosis:   Inadequate oral intake related to  (appetite; nausea) as evidenced by  (variable PO intakes)    Nutrition Interventions:   Food and/or Nutrient Delivery: Continue Current Diet  Nutrition Education/Counseling: No recommendation at this time  Coordination of Nutrition Care: Continue to monitor while inpatient  Plan of Care discussed with: Patient    Goals:  Previous Goal Met:  (Goal Set)  Goals: PO intake 75% or greater, prior to discharge       Nutrition Monitoring and Evaluation:   Behavioral-Environmental Outcomes: None Identified  Food/Nutrient Intake Outcomes: Food and Nutrient Intake  Physical Signs/Symptoms Outcomes: Biochemical Data, GI Status, Fluid Status or Edema, Weight, Skin    Discharge Planning:     Too soon to determine     Shahriar Strong RD, LD  Contact: [phone]

## 2023-03-10 NOTE — PROGRESS NOTES
OBS/CDU   RESIDENT NOTE      Patients PCP is:  JW Cabral - FELIPE        SUBJECTIVE      Patient evaluated in his room this morning while eating breakfast.  Patient states overall he feels better and has no new concerns today. He is aware we are awaiting ECF placement. No acute events overnight. Has been able to tolerate a full diet without nausea or vomiting. The patient is urinating on his own and is passing flatus. Denies fever, chills, nausea, vomiting, chest pain, shortness of breath, abdominal pain, focal weakness, numbness, tingling, urinary/bowel symptoms, vision changes, visual hallucinations, or headache. PHYSICAL EXAM      General: NAD, AO X 3  Heent: EMOI, PERRL  Neck: SUPPLE, NO JVD  Cardiovascular: RRR, S1S2  Pulmonary: CTAB, NO SOB  Abdomen: SOFT, NTTP, ND, +BS  Extremities: +2/4 PULSES DISTAL, NO SWELLING  Neuro / Psych: NO NUMBNESS OR TINGLING, MENTATION AT BASELINE, pill-rolling tremor bilateral upper extremities    PERTINENT TEST /EXAMS      I have reviewed all available laboratory results.     MEDICATIONS CURRENT   pantoprazole (PROTONIX) 40 mg in sodium chloride (PF) 0.9 % 10 mL injection, BID  amLODIPine (NORVASC) tablet 5 mg, Daily  rOPINIRole (REQUIP) tablet 0.5 mg, TID  sodium chloride flush 0.9 % injection 5-40 mL, 2 times per day  sodium chloride flush 0.9 % injection 5-40 mL, PRN  0.9 % sodium chloride infusion, PRN  enoxaparin (LOVENOX) injection 40 mg, Daily  ondansetron (ZOFRAN-ODT) disintegrating tablet 4 mg, Q8H PRN   Or  ondansetron (ZOFRAN) injection 4 mg, Q6H PRN  polyethylene glycol (GLYCOLAX) packet 17 g, Daily PRN  acetaminophen (TYLENOL) tablet 650 mg, Q6H PRN   Or  acetaminophen (TYLENOL) suppository 650 mg, Q6H PRN  amitriptyline (ELAVIL) tablet 10 mg, Nightly  atorvastatin (LIPITOR) tablet 40 mg, Daily  carbidopa-levodopa (SINEMET)  MG per tablet 1 tablet, 4x Daily  clopidogrel (PLAVIX) tablet 75 mg, Daily  entacapone (COMTAN) tablet 200 mg, 4x Daily  gabapentin (NEURONTIN) capsule 100 mg, TID  isosorbide mononitrate (IMDUR) extended release tablet 30 mg, Daily  sucralfate (CARAFATE) tablet 1 g, 4x Daily  tamsulosin (FLOMAX) capsule 0.4 mg, Daily        All medication charted and reviewed. CONSULTS      IP CONSULT TO CARDIOLOGY  IP CONSULT TO NEUROLOGY  IP CONSULT TO GI    ASSESSMENT/PLAN       Rhett Juarez is a 79 y.o. male who presents with epigastric pain and chest pain    Epigastric chest pain. Cardiology signed off  Neurology consult  History of Parkinson's disease  Patient has had maximized medications. No further need for neurology intervention  GI consult. GI sign off  PPI, soft diet, bowel regiment  Dysphagia  Seen by speech therapy  Safe to swallow for easy to chew diet. Patient with multiple ED returns secondary to poor compliance at home. 5. Placement. Multiple ED visits with poor medication compliance at home  [facility] accepted patient  See case management notes. Continue home medications and pain control  Monitor vitals, labs, and imaging  DISPO: pending consults and clinical improvement    --  Bhumika Marcelino DO  Emergency Medicine Resident Physician     This dictation was generated by voice recognition computer software. Although all attempts are made to edit the dictation for accuracy, there may be errors in the transcription that are not intended.

## 2023-03-10 NOTE — PROGRESS NOTES
Occupational Therapy  Facility/Department: [address] ORTHO/MED SURG  Occupational Therapy Daily Treatment Note    Name: Lemuel Richards  : 1940  MRN: [id number]  Date of Service: 3/10/2023    Discharge Recommendations:  Patient would benefit from continued therapy after discharge     Patient Diagnosis(es): The primary encounter diagnosis was Chest pain, unspecified type. Diagnoses of Abdominal pain, epigastric and Dysphagia, unspecified type were also pertinent to this visit. 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 Past Surgical History:  has a past surgical history that includes Colonoscopy (2012); shoulder surgery (Right); pr colsc flx w/rmvl of tumor polyp lesion snare tq (N/A, 2017); Colonoscopy (2017); Cholecystectomy, laparoscopic (N/A, 10/29/2022); Esophagogastroduodenoscopy (2023); and Upper gastrointestinal endoscopy (N/A, 2023). Assessment   Performance deficits / Impairments: Decreased functional mobility ; Decreased ADL status; Decreased strength;Decreased safe awareness;Decreased cognition;Decreased endurance;Decreased balance;Decreased high-level IADLs  Assessment: Pt making steady gains towards goals this session and will continue to benefit from further OT services following discharge from acute care hospital  Prognosis: Good  Activity Tolerance  Activity Tolerance: Patient Tolerated treatment well        Plan   Occupational Therapy Plan  Times Per Week: 3-5 x/wk  Current Treatment Recommendations: Strengthening, Balance training, Endurance training, Cognitive reorientation, Pain management, Functional mobility training, Safety education & training, Patient/Caregiver education & training, Equipment evaluation, education, & procurement, Self-Care / ADL, Home management training, Coordination training     Restrictions  Restrictions/Precautions  Restrictions/Precautions: Contact Precautions, Fall Risk  Required Braces or Orthoses?: No  Position Activity Restriction  Other position/activity restrictions: up with assistance, Hx of parkinson's    Subjective   General  Chart Reviewed: Yes  Patient assessed for rehabilitation services?: Yes  Response to previous treatment: Patient with no complaints from previous session  Family / Caregiver Present: No  General Comment  Comments: RN ok'd patient for OT treatment this morning. Pt seated in recliner with PTA just fnishing session. Pt  agreeable to OT session and reports back aching with RN present providing medications. Pt positioned for comfort at end of session. Objective   Safety Devices  Type of Devices: Gait belt;Left in chair;Chair alarm in place;Call light within reach; Patient at risk for falls;Nurse notified  Restraints  Restraints Initially in Place: No  Balance  Sitting: Without support (SBA upright in chair ~25 minutes)  Standing: With support (One UE support standing 3 minutes and 5 minutes with CGA)  Transfer Training  Transfer Training: Yes  Overall Level of Assistance: Contact-guard assistance; Additional time  Interventions: Verbal cues; Safety awareness training  Sit to Stand: Contact-guard assistance; Additional time  Stand to Sit: Contact-guard assistance; Additional time  Bed to Chair: Contact-guard assistance; Additional time  Gait  Overall Level of Assistance: Contact-guard assistance  Interventions: Verbal cues; Safety awareness training  Distance (ft):  (Room distances)  Assistive Device: Walker, rolling;Gait belt        ADL  Grooming: Modified independent   Grooming Skilled Clinical Factors: Pt completed face washing, neck and hands, oral care while seated upright in chair  UE Bathing: Stand by assistance  LE Bathing: Contact guard assistance; Increased time to complete  LE Bathing Skilled Clinical Factors: Pt stood for all angelina hygiene and utilized cross leg technique for washing of feet in sitting  UE Dressing: Stand by assistance;Setup  UE Dressing Skilled Clinical Factors: Pt doffed and donned gown like a t-shirt with set-up  LE Dressing: Contact guard assistance  LE Dressing Skilled Clinical Factors: doff and brett slipper socks  Toileting: Minimal assistance;Setup; Increased time to complete  Toileting Skilled Clinical Factors: Assist with brief mgt  Additional Comments: Pt completed tasks 75% standing and 25% seated this session as noted above. Pt requried occasional rest break and redirection to tasks     Activity Tolerance  Activity Tolerance: Patient tolerated treatment well;Patient limited by endurance  Bed mobility  Bed Mobility Comments: Pt began and completed session seated in recliner  Transfers  Sit to stand: Contact guard assistance  Stand to sit: Contact guard assistance  Transfer Comments: utilizing RW requiring min v/c for proper hand placement     Cognition  Overall Cognitive Status: Exceptions  Arousal/Alertness: Appropriate responses to stimuli  Following Commands: Follows multistep commands with repitition; Follows multistep commands with increased time  Attention Span: Attends with cues to redirect  Safety Judgement: Decreased awareness of need for assistance;Decreased awareness of need for safety  Problem Solving: Decreased awareness of errors  Initiation: Requires cues for some  Cognition Comment: Talkative  Orientation  Overall Orientation Status: Within Normal Limits          Education Given To: Patient  Education Provided: Role of Therapy;Transfer Training;Precautions; ADL Adaptive Strategies  Education Provided Comments: importance of increasing activity  Education Method: Demonstration;Verbal  Barriers to Learning: None  Education Outcome: Verbalized understanding;Demonstrated understanding;Continued education needed    AM-PAC Score  AM-PAC Inpatient Daily Activity Raw Score: 21 (03/10/23 0930)  AM-PAC Inpatient ADL T-Scale Score : 44.27 (03/10/23 0930)  ADL Inpatient CMS 0-100% Score: 32.79 (03/10/23 0930)  ADL Inpatient CMS G-Code Modifier : Kesha Allen (03/10/23 0930)      Goals  Short Term Goals  Time Frame for Short Term Goals: By discharge, pt will:  Short Term Goal 1: Demo SBA for functional transfers and functional mobility with use of LRD for engagement in ADLs/IADLs  Short Term Goal 2: Demo Mod I for UB ADLs with use of adaptive tech/AE PRN  Short Term Goal 3: Demo CGA for LB ADLs and toileting tasks with setup and AE PRN  Short Term Goal 4: Demo 8 min dynamic standing and reaching outside SHAB[place]IR with unilateral hand release and SBA for improved balance during ADL/IADL participation  Short Term Goal 5: Follow 75% of 3-step commands with appropriate initiation and sequencing for improved functional independence with daily occupations       Therapy Time   Individual Concurrent Group Co-treatment   Time In 0839         Time Out 0905         Minutes 26         Timed Code Treatment Minutes: [facility], [place]/L

## 2023-03-10 NOTE — PROGRESS NOTES
[address]  CDU / OBSERVATION ENCOUNTER  ATTENDING NOTE       I performed a history and physical examination of the patient and discussed management with the resident or midlevel provider. I reviewed the resident or midlevel provider's note and agree with the documented findings and plan of care. Any areas of disagreement are noted on the chart. I was personally present for the key portions of any procedures. I have documented in the chart those procedures where I was not present during the key portions. I have reviewed the nurses notes. I agree with the chief complaint, past medical history, past surgical history, allergies, medications, social and family history as documented unless otherwise noted below. The Family history, social history, and ROS are effectively unchanged since admission unless noted elsewhere in the chart. This patient was placed in the observation unit for reevaluation for possible admission to the hospital    Patient with plan for ECF placement. Patient has been approved for extended care facility. Awaiting precertification and transportation. Patient had beta-blockers discontinued yesterday after bradycardia had been noted. Today the patient's heart rate is improved with rates in the mid 50s as opposed to 40s. Blood pressure remained stable. Patient continues on PPI. Patient for reassessment and arrangement of safe discharge disposition. Per case management patient will need peer to peer assessment. Called to schedule peer to peer but medical director unavailable at [facility]. Number is . Case reference number [id number]. Will retry tomorrow. Patient doing well today. No medical complaint at this time.     Cordelia Zuluaga MD  Attending Emergency  Physician

## 2023-03-11 LAB
GLUCOSE BLD-MCNC: 113 MG/DL (ref 75–110)
GLUCOSE BLD-MCNC: 149 MG/DL (ref 75–110)

## 2023-03-11 PROCEDURE — 6360000002 HC RX W HCPCS

## 2023-03-11 PROCEDURE — 6370000000 HC RX 637 (ALT 250 FOR IP): Performed by: STUDENT IN AN ORGANIZED HEALTH CARE EDUCATION/TRAINING PROGRAM

## 2023-03-11 PROCEDURE — 1200000000 HC SEMI PRIVATE

## 2023-03-11 PROCEDURE — 82947 ASSAY GLUCOSE BLOOD QUANT: CPT

## 2023-03-11 PROCEDURE — 6370000000 HC RX 637 (ALT 250 FOR IP)

## 2023-03-11 RX ORDER — PANTOPRAZOLE SODIUM 40 MG/1
40 TABLET, DELAYED RELEASE ORAL
Status: DISCONTINUED | OUTPATIENT
Start: 2023-03-11 | End: 2023-03-13 | Stop reason: HOSPADM

## 2023-03-11 RX ORDER — CALCIUM CARBONATE 200(500)MG
500 TABLET,CHEWABLE ORAL DAILY PRN
Status: DISCONTINUED | OUTPATIENT
Start: 2023-03-11 | End: 2023-03-13 | Stop reason: HOSPADM

## 2023-03-11 RX ADMIN — CARBIDOPA AND LEVODOPA 1 TABLET: 25; 100 TABLET ORAL at 13:35

## 2023-03-11 RX ADMIN — ENTACAPONE 200 MG: 200 TABLET, FILM COATED ORAL at 21:40

## 2023-03-11 RX ADMIN — ENOXAPARIN SODIUM 40 MG: 100 INJECTION SUBCUTANEOUS at 09:17

## 2023-03-11 RX ADMIN — ISOSORBIDE MONONITRATE 30 MG: 30 TABLET, EXTENDED RELEASE ORAL at 09:17

## 2023-03-11 RX ADMIN — SUCRALFATE 1 G: 1 TABLET ORAL at 16:51

## 2023-03-11 RX ADMIN — CARBIDOPA AND LEVODOPA 1 TABLET: 25; 100 TABLET ORAL at 09:16

## 2023-03-11 RX ADMIN — SUCRALFATE 1 G: 1 TABLET ORAL at 09:17

## 2023-03-11 RX ADMIN — SUCRALFATE 1 G: 1 TABLET ORAL at 13:36

## 2023-03-11 RX ADMIN — DESMOPRESSIN ACETATE 40 MG: 0.2 TABLET ORAL at 09:16

## 2023-03-11 RX ADMIN — AMITRIPTYLINE HYDROCHLORIDE 10 MG: 10 TABLET, FILM COATED ORAL at 20:45

## 2023-03-11 RX ADMIN — ROPINIROLE HYDROCHLORIDE 0.5 MG: 0.25 TABLET, FILM COATED ORAL at 09:17

## 2023-03-11 RX ADMIN — PANTOPRAZOLE SODIUM 40 MG: 40 TABLET, DELAYED RELEASE ORAL at 16:51

## 2023-03-11 RX ADMIN — ONDANSETRON 4 MG: 4 TABLET, ORALLY DISINTEGRATING ORAL at 09:23

## 2023-03-11 RX ADMIN — SUCRALFATE 1 G: 1 TABLET ORAL at 20:45

## 2023-03-11 RX ADMIN — CARBIDOPA AND LEVODOPA 1 TABLET: 25; 100 TABLET ORAL at 20:45

## 2023-03-11 RX ADMIN — ENTACAPONE 200 MG: 200 TABLET, FILM COATED ORAL at 16:51

## 2023-03-11 RX ADMIN — ROPINIROLE HYDROCHLORIDE 0.5 MG: 0.25 TABLET, FILM COATED ORAL at 20:45

## 2023-03-11 RX ADMIN — ROPINIROLE HYDROCHLORIDE 0.5 MG: 0.25 TABLET, FILM COATED ORAL at 13:35

## 2023-03-11 RX ADMIN — AMLODIPINE BESYLATE 5 MG: 5 TABLET ORAL at 09:16

## 2023-03-11 RX ADMIN — CLOPIDOGREL 75 MG: 75 TABLET, FILM COATED ORAL at 09:17

## 2023-03-11 RX ADMIN — GABAPENTIN 100 MG: 100 CAPSULE ORAL at 20:45

## 2023-03-11 RX ADMIN — ENTACAPONE 200 MG: 200 TABLET, FILM COATED ORAL at 09:17

## 2023-03-11 RX ADMIN — GABAPENTIN 100 MG: 100 CAPSULE ORAL at 09:16

## 2023-03-11 RX ADMIN — TAMSULOSIN HYDROCHLORIDE 0.4 MG: 0.4 CAPSULE ORAL at 09:17

## 2023-03-11 RX ADMIN — CARBIDOPA AND LEVODOPA 1 TABLET: 25; 100 TABLET ORAL at 16:51

## 2023-03-11 RX ADMIN — ENTACAPONE 200 MG: 200 TABLET, FILM COATED ORAL at 13:35

## 2023-03-11 RX ADMIN — GABAPENTIN 100 MG: 100 CAPSULE ORAL at 13:35

## 2023-03-11 ASSESSMENT — PAIN SCALES - GENERAL: PAINLEVEL_OUTOF10: 0

## 2023-03-11 NOTE — PROGRESS NOTES
OBS/CDU   RESIDENT NOTE      Patients PCP is:  JW Moreno - FELIPE        SUBJECTIVE      No acute events overnight. Has been able to tolerate a full diet without nausea or vomiting. The patient is urinating on his own and is passing flatus. Denies fever, chills, nausea, vomiting, chest pain, shortness of breath, abdominal pain, focal weakness, numbness, tingling, urinary/bowel symptoms, vision changes, visual hallucinations, or headache. Complains of some mild epigastric discomfort. States he had a before. No associated fevers, chills, nausea, vomiting. PHYSICAL EXAM      General: NAD, AO X 3  Heent: EMOI, PERRL  Neck: SUPPLE, NO JVD  Cardiovascular: RRR, S1S2  Pulmonary: CTAB, NO SOB  Abdomen: SOFT, NTTP, ND, +BS  Extremities: +2/4 PULSES DISTAL, NO SWELLING  Neuro / Psych: NO NUMBNESS OR TINGLING, MENTATION AT BASELINE, intermittent tremor    PERTINENT TEST /EXAMS      I have reviewed all available laboratory results.     MEDICATIONS CURRENT   pantoprazole (PROTONIX) 40 mg in sodium chloride (PF) 0.9 % 10 mL injection, BID  amLODIPine (NORVASC) tablet 5 mg, Daily  rOPINIRole (REQUIP) tablet 0.5 mg, TID  sodium chloride flush 0.9 % injection 5-40 mL, 2 times per day  sodium chloride flush 0.9 % injection 5-40 mL, PRN  0.9 % sodium chloride infusion, PRN  enoxaparin (LOVENOX) injection 40 mg, Daily  ondansetron (ZOFRAN-ODT) disintegrating tablet 4 mg, Q8H PRN   Or  ondansetron (ZOFRAN) injection 4 mg, Q6H PRN  polyethylene glycol (GLYCOLAX) packet 17 g, Daily PRN  acetaminophen (TYLENOL) tablet 650 mg, Q6H PRN   Or  acetaminophen (TYLENOL) suppository 650 mg, Q6H PRN  amitriptyline (ELAVIL) tablet 10 mg, Nightly  atorvastatin (LIPITOR) tablet 40 mg, Daily  carbidopa-levodopa (SINEMET)  MG per tablet 1 tablet, 4x Daily  clopidogrel (PLAVIX) tablet 75 mg, Daily  entacapone (COMTAN) tablet 200 mg, 4x Daily  gabapentin (NEURONTIN) capsule 100 mg, TID  isosorbide mononitrate (IMDUR) extended release tablet 30 mg, Daily  sucralfate (CARAFATE) tablet 1 g, 4x Daily  tamsulosin (FLOMAX) capsule 0.4 mg, Daily        All medication charted and reviewed. CONSULTS      IP CONSULT TO CARDIOLOGY  IP CONSULT TO NEUROLOGY  IP CONSULT TO GI  IP CONSULT TO IV TEAM    ASSESSMENT/PLAN       Rylie Malone is a 79 y.o. male who presents with epigastric pain and chest pain    Epigastric discomfort  - Negative work-up. Cardiology signed off. GI signed off  -As needed calcium carbonate    Dysphagia  - Negative work-up by speech therapy. Easy to chew diet. Placement  Accepted at [facility] nursing home, however insurance denying requesting peer to peer. Attending informed. Pending placement. Continue home medications and pain control  Monitor vitals, labs, and imaging  DISPO: pending consults and clinical improvement    --  Tatiana Rao MD  Emergency Medicine Resident Physician     This dictation was generated by voice recognition computer software. Although all attempts are made to edit the dictation for accuracy, there may be errors in the transcription that are not intended.

## 2023-03-11 NOTE — PROGRESS NOTES
[facility]  CDU / OBSERVATION ENCOUNTER  ATTENDING NOTE       I performed a history and physical examination of the patient and discussed management with the resident or midlevel provider. I reviewed the resident or midlevel provider's note and agree with the documented findings and plan of care. Any areas of disagreement are noted on the chart. I was personally present for the key portions of any procedures. I have documented in the chart those procedures where I was not present during the key portions. I have reviewed the nurses notes. I agree with the chief complaint, past medical history, past surgical history, allergies, medications, social and family history as documented unless otherwise noted below. The Family history, social history, and ROS are effectively unchanged since admission unless noted elsewhere in the chart. This patient was placed in the observation unit for reevaluation for possible admission to the hospital    The patient is a 79-year-old male who was initially admitted for bradycardia and chest pain. His bradycardia resolved after he was taken off of beta-blockers. Cardiology evaluated the patient and has signed off. He has history of Parkinson's disease and neurology was consulted but has since signed off. He has had some dysphagia but has been treated by speech, language and pathology with improvement. GI also evaluated the patient and has signed off. The patient is poorly compliant and unable to care for himself and current plan is to have him placed at a ECF. Awaiting placement.     [address], [address] [facility]  Attending Emergency  Physician

## 2023-03-11 NOTE — PLAN OF CARE
Problem: Chronic Conditions and Co-morbidities  Goal: Patient's chronic conditions and co-morbidity symptoms are monitored and maintained or improved  Outcome: Progressing     Problem: Pain  Goal: Verbalizes/displays adequate comfort level or baseline comfort level  Outcome: Progressing     Problem: Safety - Adult  Goal: Free from fall injury  Outcome: Progressing     Problem: Discharge Planning  Goal: Discharge to home or other facility with appropriate resources  Outcome: Progressing     Problem: Neurosensory - Adult  Goal: Achieves maximal functionality and self care  Outcome: Progressing     Problem: Cardiovascular - Adult  Goal: Maintains optimal cardiac output and hemodynamic stability  Outcome: Progressing     Problem: Musculoskeletal - Adult  Goal: Return mobility to safest level of function  Outcome: Progressing  Goal: Return ADL status to a safe level of function  Outcome: Progressing     Problem: Nutrition Deficit:  Goal: Optimize nutritional status  Outcome: Progressing

## 2023-03-12 PROCEDURE — 6370000000 HC RX 637 (ALT 250 FOR IP): Performed by: STUDENT IN AN ORGANIZED HEALTH CARE EDUCATION/TRAINING PROGRAM

## 2023-03-12 PROCEDURE — 6360000002 HC RX W HCPCS

## 2023-03-12 PROCEDURE — 6370000000 HC RX 637 (ALT 250 FOR IP)

## 2023-03-12 PROCEDURE — 1200000000 HC SEMI PRIVATE

## 2023-03-12 RX ADMIN — ENTACAPONE 200 MG: 200 TABLET, FILM COATED ORAL at 19:53

## 2023-03-12 RX ADMIN — PANTOPRAZOLE SODIUM 40 MG: 40 TABLET, DELAYED RELEASE ORAL at 06:17

## 2023-03-12 RX ADMIN — ENTACAPONE 200 MG: 200 TABLET, FILM COATED ORAL at 17:40

## 2023-03-12 RX ADMIN — TAMSULOSIN HYDROCHLORIDE 0.4 MG: 0.4 CAPSULE ORAL at 10:36

## 2023-03-12 RX ADMIN — SUCRALFATE 1 G: 1 TABLET ORAL at 14:20

## 2023-03-12 RX ADMIN — ROPINIROLE HYDROCHLORIDE 0.5 MG: 0.25 TABLET, FILM COATED ORAL at 19:52

## 2023-03-12 RX ADMIN — ROPINIROLE HYDROCHLORIDE 0.5 MG: 0.25 TABLET, FILM COATED ORAL at 14:20

## 2023-03-12 RX ADMIN — DESMOPRESSIN ACETATE 40 MG: 0.2 TABLET ORAL at 10:36

## 2023-03-12 RX ADMIN — CARBIDOPA AND LEVODOPA 1 TABLET: 25; 100 TABLET ORAL at 10:37

## 2023-03-12 RX ADMIN — AMLODIPINE BESYLATE 5 MG: 5 TABLET ORAL at 10:38

## 2023-03-12 RX ADMIN — GABAPENTIN 100 MG: 100 CAPSULE ORAL at 10:36

## 2023-03-12 RX ADMIN — GABAPENTIN 100 MG: 100 CAPSULE ORAL at 19:53

## 2023-03-12 RX ADMIN — CARBIDOPA AND LEVODOPA 1 TABLET: 25; 100 TABLET ORAL at 19:53

## 2023-03-12 RX ADMIN — ROPINIROLE HYDROCHLORIDE 0.5 MG: 0.25 TABLET, FILM COATED ORAL at 10:36

## 2023-03-12 RX ADMIN — CLOPIDOGREL 75 MG: 75 TABLET, FILM COATED ORAL at 10:37

## 2023-03-12 RX ADMIN — CARBIDOPA AND LEVODOPA 1 TABLET: 25; 100 TABLET ORAL at 14:20

## 2023-03-12 RX ADMIN — CARBIDOPA AND LEVODOPA 1 TABLET: 25; 100 TABLET ORAL at 17:40

## 2023-03-12 RX ADMIN — PANTOPRAZOLE SODIUM 40 MG: 40 TABLET, DELAYED RELEASE ORAL at 17:40

## 2023-03-12 RX ADMIN — AMITRIPTYLINE HYDROCHLORIDE 10 MG: 10 TABLET, FILM COATED ORAL at 19:53

## 2023-03-12 RX ADMIN — SUCRALFATE 1 G: 1 TABLET ORAL at 17:40

## 2023-03-12 RX ADMIN — ENTACAPONE 200 MG: 200 TABLET, FILM COATED ORAL at 14:20

## 2023-03-12 RX ADMIN — ENTACAPONE 200 MG: 200 TABLET, FILM COATED ORAL at 10:36

## 2023-03-12 RX ADMIN — SUCRALFATE 1 G: 1 TABLET ORAL at 10:37

## 2023-03-12 RX ADMIN — ISOSORBIDE MONONITRATE 30 MG: 30 TABLET, EXTENDED RELEASE ORAL at 10:36

## 2023-03-12 RX ADMIN — ENOXAPARIN SODIUM 40 MG: 100 INJECTION SUBCUTANEOUS at 10:38

## 2023-03-12 RX ADMIN — SUCRALFATE 1 G: 1 TABLET ORAL at 19:52

## 2023-03-12 RX ADMIN — GABAPENTIN 100 MG: 100 CAPSULE ORAL at 14:20

## 2023-03-12 ASSESSMENT — PAIN SCALES - GENERAL: PAINLEVEL_OUTOF10: 0

## 2023-03-12 NOTE — PROGRESS NOTES
[address]  CDU / OBSERVATION ENCOUNTER  ATTENDING NOTE       I performed a history and physical examination of the patient and discussed management with the resident or midlevel provider. I reviewed the resident or midlevel provider's note and agree with the documented findings and plan of care. Any areas of disagreement are noted on the chart. I was personally present for the key portions of any procedures. I have documented in the chart those procedures where I was not present during the key portions. I have reviewed the nurses notes. I agree with the chief complaint, past medical history, past surgical history, allergies, medications, social and family history as documented unless otherwise noted below. The Family history, social history, and ROS are effectively unchanged since admission unless noted elsewhere in the chart. Patient awake alert, tolerating oral intake. Has been evaluated by emergent multiple consultant services. Is awaiting placement. Was put on oxygen last night unclear reason why. Will monitor on room air with pulse oximetry to see if patient warrants additional oxygen. Still awaiting placement. He is otherwise at base line. Does have chronic abdominal pain but seems to be tolerating oral intake no nausea or vomiting, abdomen is overall soft and nonperitoneal.  Having bowel movements.     Wicho Honeycutt  Attending Emergency  Physician

## 2023-03-12 NOTE — PLAN OF CARE
Problem: Chronic Conditions and Co-morbidities  Goal: Patient's chronic conditions and co-morbidity symptoms are monitored and maintained or improved  3/12/2023 0156 by Frank Dumont RN  Outcome: Progressing  3/11/2023 1532 by Sd Briggs RN  Outcome: Progressing     Problem: Pain  Goal: Verbalizes/displays adequate comfort level or baseline comfort level  3/12/2023 0156 by Frank Dumont RN  Outcome: Progressing  3/11/2023 1532 by Sd Briggs RN  Outcome: Progressing     Problem: Safety - Adult  Goal: Free from fall injury  3/12/2023 0156 by Frank Dumont RN  Outcome: Progressing  3/11/2023 1532 by Sd Briggs RN  Outcome: Progressing     Problem: Discharge Planning  Goal: Discharge to home or other facility with appropriate resources  3/12/2023 0156 by Frank Dumont RN  Outcome: Progressing  3/11/2023 1532 by Sd Briggs RN  Outcome: Progressing     Problem: Neurosensory - Adult  Goal: Achieves maximal functionality and self care  3/12/2023 0156 by Frank Dumont RN  Outcome: Progressing  3/11/2023 1532 by Sd Briggs RN  Outcome: Progressing     Problem: Cardiovascular - Adult  Goal: Maintains optimal cardiac output and hemodynamic stability  3/12/2023 0156 by Frank Dumont RN  Outcome: Progressing  3/11/2023 1532 by Sd Briggs RN  Outcome: Progressing     Problem: Musculoskeletal - Adult  Goal: Return mobility to safest level of function  3/12/2023 0156 by Frank Dumont RN  Outcome: Progressing  3/11/2023 1532 by Sd Briggs RN  Outcome: Progressing  Goal: Return ADL status to a safe level of function  3/12/2023 0156 by Frank Dumont RN  Outcome: Progressing  3/11/2023 1532 by Sd Briggs RN  Outcome: Progressing     Problem: Nutrition Deficit:  Goal: Optimize nutritional status  3/12/2023 0156 by Frank Dumont RN  Outcome: Progressing  3/11/2023 1532 by Sd Briggs RN  Outcome: Progressing

## 2023-03-12 NOTE — PROGRESS NOTES
[facility]  CDU / OBSERVATION ENCOUNTER    Pt Name: Jose James  MRN: [id number]  [place] 1940  Date of evaluation: 3/12/23  Patient's PCP is :  JW Cabral CNP    CHIEF COMPLAINT       Chief Complaint   Patient presents with    Chest Pain         HISTORY OF PRESENT ILLNESS    Jose James is a 79 y.o. male with no acute events overnight. HR 50-60s overnight. Urinating, passing flatus. Tolerating diet. No new labs. Complains of nausea this AM and mouth pain     Location/Symptom: mouth pain and nausea  Timing/Onset: constant  Provocation: worse with eating  Quality: constant  Radiation: mouth   Severity: unsure  Timing/Duration: constant  Modifying Factors: worse with eating     History was obtained in part through review of the ED chart. 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 I have reviewed the past medical history with the patient and it is pertinent to this complaint. SURGICAL HISTORY      has a past surgical history that includes Colonoscopy (11/02/2012); shoulder surgery (Right); pr colsc flx w/rmvl of tumor polyp lesion snare tq (N/A, 09/19/2017); Colonoscopy (09/19/2017); Cholecystectomy, laparoscopic (N/A, 10/29/2022); Esophagogastroduodenoscopy (01/09/2023); and Upper gastrointestinal endoscopy (N/A, 1/9/2023).   I have reviewed and agree with Surgical History entered and it is pertinent to this complaint. CURRENT MEDICATIONS     calcium carbonate (TUMS) chewable tablet 500 mg, Daily PRN  pantoprazole (PROTONIX) tablet 40 mg, BID AC  amLODIPine (NORVASC) tablet 5 mg, Daily  rOPINIRole (REQUIP) tablet 0.5 mg, TID  sodium chloride flush 0.9 % injection 5-40 mL, 2 times per day  sodium chloride flush 0.9 % injection 5-40 mL, PRN  0.9 % sodium chloride infusion, PRN  enoxaparin (LOVENOX) injection 40 mg, Daily  ondansetron (ZOFRAN-ODT) disintegrating tablet 4 mg, Q8H PRN   Or  ondansetron (ZOFRAN) injection 4 mg, Q6H PRN  polyethylene glycol (GLYCOLAX) packet 17 g, Daily PRN  acetaminophen (TYLENOL) tablet 650 mg, Q6H PRN   Or  acetaminophen (TYLENOL) suppository 650 mg, Q6H PRN  amitriptyline (ELAVIL) tablet 10 mg, Nightly  atorvastatin (LIPITOR) tablet 40 mg, Daily  carbidopa-levodopa (SINEMET)  MG per tablet 1 tablet, 4x Daily  clopidogrel (PLAVIX) tablet 75 mg, Daily  entacapone (COMTAN) tablet 200 mg, 4x Daily  gabapentin (NEURONTIN) capsule 100 mg, TID  isosorbide mononitrate (IMDUR) extended release tablet 30 mg, Daily  sucralfate (CARAFATE) tablet 1 g, 4x Daily  tamsulosin (FLOMAX) capsule 0.4 mg, Daily        All medication charted and reviewed. ALLERGIES     is allergic to aspirin. FAMILY HISTORY     He indicated that the status of his mother is unknown. He indicated that the status of his father is unknown.     family history includes No Known Problems in his father and mother. The patient denies any pertinent family history. I have reviewed and agree with the family history entered. I have reviewed the Family History and it is not significant to the case    SOCIAL HISTORY      reports that he has quit smoking. He has never used smokeless tobacco. He reports that he does not drink alcohol and does not use drugs. I have reviewed and agree with all Social.  There are no concerns for substance abuse/use.     PHYSICAL EXAM INITIAL VITALS:  height is 5' 7\" (1.702 m) and weight is 179 lb (81.2 kg). His axillary temperature is 96.8 °F (36 °C). His blood pressure is 131/60 and his pulse is 63. His respiration is 17 and oxygen saturation is 100%. CONSTITUTIONAL: no apparent distress, appears stated age    HEAD: normocephalic, atraumatic   EYES: PERRLA, EOMI    ENT: moist mucous membranes, uvula midline   NECK: supple, symmetric   BACK: symmetric   LUNGS: clear to auscultation bilaterally   CARDIOVASCULAR: regular rate and rhythm, no murmurs, rubs or gallops   ABDOMEN: soft, non-tender, non-distended   NEUROLOGIC:  MAEx4, no focal sensory or motor deficits. Pill rolling tremor to L hand at rest    MUSCULOSKELETAL: no clubbing, cyanosis or edema   SKIN: no rash or wounds     DIAGNOSTIC RESULTS       RADIOLOGY:   I directly visualized the following  images and reviewed the radiologist interpretations:    XR CHEST (2 VW)    Result Date: 3/2/2023  EXAMINATION: TWO XRAY VIEWS OF THE CHEST 3/2/2023 12:58 pm COMPARISON: 02/05/2023. HISTORY: ORDERING SYSTEM PROVIDED HISTORY: Chest pain TECHNOLOGIST PROVIDED HISTORY: Chest pain FINDINGS: The lungs and costophrenic angles are clear. The cardiomediastinal silhouette and pulmonary vessels appear normal.     No radiographic evidence of an acute cardiopulmonary process. FL MODIFIED BARIUM SWALLOW W VIDEO    Result Date: 3/3/2023  EXAMINATION: MODIFIED BARIUM SWALLOW WAS PERFORMED IN CONJUNCTION WITH SPEECH PATHOLOGY SERVICES TECHNIQUE: Under fluoroscopic evaluation cineradiography/videoradiography recordings were performed in conjunction with the speech-language pathologist (SLP). Various liquid, solid and/or semi-solid barium preparations were used to assess swallowing function.  FLUOROSCOPY DOSE AND TYPE: Fluoro time: 1.5 minutes Dose: 14.775 mGy COMPARISON: None HISTORY: ORDERING SYSTEM PROVIDED HISTORY: evaluate swallowing TECHNOLOGIST PROVIDED HISTORY: evaluate swallowing 69-year-old male; evaluate swallowing; rule out aspiration FINDINGS: No penetration or aspiration with the thick and thin liquid substance by straw, or pureed/pudding thick substances. Soft solid and cookie solid substances were not attempted. No penetration or aspiration with the above administered substances. Please see separate speech pathology report for full discussion of findings and recommendations. LABS:  I have reviewed and interpreted all available lab results.   Labs Reviewed   TROPONIN - Abnormal; Notable for the following components:       Result Value    Troponin, High Sensitivity 30 (*)     All other components within normal limits   COMPREHENSIVE METABOLIC PANEL - Abnormal; Notable for the following components:    Glucose 131 (*)     Creatinine 1.35 (*)     Est, Glom Filt Rate 52 (*)     Sodium 133 (*)     All other components within normal limits   CBC WITH AUTO DIFFERENTIAL - Abnormal; Notable for the following components:    Seg Neutrophils 68 (*)     Lymphocytes 20 (*)     All other components within normal limits   TROPONIN - Abnormal; Notable for the following components:    Troponin, High Sensitivity 28 (*)     All other components within normal limits   BASIC METABOLIC PANEL - Abnormal; Notable for the following components:    Glucose 149 (*)     All other components within normal limits   POC GLUCOSE FINGERSTICK - Abnormal; Notable for the following components:    POC Glucose 130 (*)     All other components within normal limits   POC GLUCOSE FINGERSTICK - Abnormal; Notable for the following components:    POC Glucose 113 (*)     All other components within normal limits   POC GLUCOSE FINGERSTICK - Abnormal; Notable for the following components:    POC Glucose 149 (*)     All other components within normal limits   COVID-19, RAPID   LIPASE   SPECIMEN REJECTION   CBC   PREVIOUS SPECIMEN         CDU IMPRESSION / PLAN     Margie Luong is a 79 y.o. male who presents with epigastric pain and chest pain Chest pain:   - DC beta blockers due to bradycardia. - Cardiology s/o. Epigastric discomfort  - Hx of esophagitis with ulceration. GI signed off, follow up in 1 month   -As needed calcium carbonate. Protonix BID. Dysphagia  - Hx Parkinsons. - video swallow study on 3/3. Negative work-up by speech therapy. Easy to chew diet. Parkinsons:  - optimized on meds. Neurology signed off     Placement  Accepted at [facility], however insurance denying requesting peer to peer. Pending placement. Continue home medications and pain control  Monitor vitals, labs, and imaging  DISPO: pending consults and clinical improvement    CONSULTS:    IP CONSULT TO CARDIOLOGY  IP CONSULT TO NEUROLOGY  IP CONSULT TO GI    PROCEDURES:  Not indicated       PATIENT REFERRED TO:    Jose Carrasco MD  [address]  [phone]    Schedule an appointment as soon as possible for a visit      JW Adair - CNP  [facility]  [address] [facility] [address]  [address] [facility] [address]  [phone]    Call  As needed      --  Alphonse Llamas DO   Emergency Medicine Resident     This dictation was generated by voice recognition computer software. Although all attempts are made to edit the dictation for accuracy, there may be errors in the transcription that are not intended.

## 2023-03-13 VITALS
HEIGHT: 67 IN | SYSTOLIC BLOOD PRESSURE: 129 MMHG | RESPIRATION RATE: 18 BRPM | TEMPERATURE: 97.5 F | DIASTOLIC BLOOD PRESSURE: 64 MMHG | BODY MASS INDEX: 28.09 KG/M2 | HEART RATE: 57 BPM | WEIGHT: 179 LBS | OXYGEN SATURATION: 97 %

## 2023-03-13 PROCEDURE — 97530 THERAPEUTIC ACTIVITIES: CPT

## 2023-03-13 PROCEDURE — 6370000000 HC RX 637 (ALT 250 FOR IP): Performed by: STUDENT IN AN ORGANIZED HEALTH CARE EDUCATION/TRAINING PROGRAM

## 2023-03-13 PROCEDURE — 6360000002 HC RX W HCPCS

## 2023-03-13 PROCEDURE — 6370000000 HC RX 637 (ALT 250 FOR IP)

## 2023-03-13 PROCEDURE — 97535 SELF CARE MNGMENT TRAINING: CPT

## 2023-03-13 RX ADMIN — CARBIDOPA AND LEVODOPA 1 TABLET: 25; 100 TABLET ORAL at 14:04

## 2023-03-13 RX ADMIN — ENTACAPONE 200 MG: 200 TABLET, FILM COATED ORAL at 14:03

## 2023-03-13 RX ADMIN — ISOSORBIDE MONONITRATE 30 MG: 30 TABLET, EXTENDED RELEASE ORAL at 10:04

## 2023-03-13 RX ADMIN — SUCRALFATE 1 G: 1 TABLET ORAL at 14:04

## 2023-03-13 RX ADMIN — AMLODIPINE BESYLATE 5 MG: 5 TABLET ORAL at 10:04

## 2023-03-13 RX ADMIN — PANTOPRAZOLE SODIUM 40 MG: 40 TABLET, DELAYED RELEASE ORAL at 05:07

## 2023-03-13 RX ADMIN — ROPINIROLE HYDROCHLORIDE 0.5 MG: 0.25 TABLET, FILM COATED ORAL at 10:04

## 2023-03-13 RX ADMIN — DESMOPRESSIN ACETATE 40 MG: 0.2 TABLET ORAL at 10:04

## 2023-03-13 RX ADMIN — ACETAMINOPHEN 650 MG: 325 TABLET ORAL at 05:07

## 2023-03-13 RX ADMIN — CARBIDOPA AND LEVODOPA 1 TABLET: 25; 100 TABLET ORAL at 10:03

## 2023-03-13 RX ADMIN — ROPINIROLE HYDROCHLORIDE 0.5 MG: 0.25 TABLET, FILM COATED ORAL at 14:05

## 2023-03-13 RX ADMIN — TAMSULOSIN HYDROCHLORIDE 0.4 MG: 0.4 CAPSULE ORAL at 10:04

## 2023-03-13 RX ADMIN — GABAPENTIN 100 MG: 100 CAPSULE ORAL at 10:04

## 2023-03-13 RX ADMIN — GABAPENTIN 100 MG: 100 CAPSULE ORAL at 14:03

## 2023-03-13 RX ADMIN — ENOXAPARIN SODIUM 40 MG: 100 INJECTION SUBCUTANEOUS at 10:04

## 2023-03-13 RX ADMIN — ENTACAPONE 200 MG: 200 TABLET, FILM COATED ORAL at 10:03

## 2023-03-13 RX ADMIN — ACETAMINOPHEN 650 MG: 325 TABLET ORAL at 11:57

## 2023-03-13 RX ADMIN — SUCRALFATE 1 G: 1 TABLET ORAL at 10:03

## 2023-03-13 RX ADMIN — CLOPIDOGREL 75 MG: 75 TABLET, FILM COATED ORAL at 10:04

## 2023-03-13 ASSESSMENT — PAIN SCALES - GENERAL: PAINLEVEL_OUTOF10: 4

## 2023-03-13 NOTE — PLAN OF CARE
Problem: Chronic Conditions and Co-morbidities  Goal: Patient's chronic conditions and co-morbidity symptoms are monitored and maintained or improved  3/13/2023 0018 by Breonna Salinas RN  Outcome: Progressing  3/12/2023 1801 by lAee Sutton RN  Outcome: Progressing     Problem: Pain  Goal: Verbalizes/displays adequate comfort level or baseline comfort level  3/13/2023 0018 by Breonna Salinas RN  Outcome: Progressing  3/12/2023 1801 by Alee Sutton RN  Outcome: Progressing     Problem: Safety - Adult  Goal: Free from fall injury  3/13/2023 0018 by Breonna Salinas RN  Outcome: Progressing  Flowsheets (Taken 3/12/2023 2000)  Free From Fall Injury: Instruct family/caregiver on patient safety  3/12/2023 1801 by Alee Sutton RN  Outcome: Progressing     Problem: Discharge Planning  Goal: Discharge to home or other facility with appropriate resources  3/13/2023 0018 by Breonna Salinas RN  Outcome: Progressing  3/12/2023 1801 by Alee Sutton RN  Outcome: Progressing     Problem: Neurosensory - Adult  Goal: Achieves maximal functionality and self care  3/13/2023 0018 by Breonna Salinas RN  Outcome: Progressing  3/12/2023 1801 by Alee Sutton RN  Outcome: Progressing     Problem: Cardiovascular - Adult  Goal: Maintains optimal cardiac output and hemodynamic stability  3/13/2023 0018 by Breonna Salinas RN  Outcome: Progressing  3/12/2023 1801 by Alee Sutton RN  Outcome: Progressing     Problem: Musculoskeletal - Adult  Goal: Return mobility to safest level of function  3/13/2023 0018 by Breonna Salinas RN  Outcome: Progressing  3/12/2023 1801 by Alee Sutton RN  Outcome: Progressing  Goal: Return ADL status to a safe level of function  3/13/2023 0018 by Breonna Salinas RN  Outcome: Progressing  3/12/2023 1801 by Alee Sutton RN  Outcome: Progressing     Problem: Nutrition Deficit:  Goal: Optimize nutritional status  3/13/2023 0018 by Breonna Salinas RN  Outcome: Progressing  3/12/2023 1801 by Lori Thompson, RN  Outcome: Progressing

## 2023-03-13 NOTE — PROGRESS NOTES
OBS/CDU   RESIDENT NOTE      Patients PCP is:  JW Adair - FELIPE        SUBJECTIVE      No acute events overnight. Has been able to tolerate a full diet without nausea or vomiting. The patient is urinating on his own and is passing flatus. Denies fever, chills, nausea, vomiting, chest pain, shortness of breath, abdominal pain, focal weakness, numbness, tingling, urinary/bowel symptoms, vision changes, visual hallucinations, or headache. PHYSICAL EXAM      General: NAD, AO X 3  Heent: EMOI, PERRL, moist mucous membranes  Neck: SUPPLE, NO JVD  Cardiovascular: RRR, S1S2  Pulmonary: CTAB, NO SOB  Abdomen: SOFT, NTTP, ND, +BS  Extremities: +2/4 PULSES DISTAL, NO SWELLING  Neuro / Psych: NO NUMBNESS OR TINGLING, MENTATION AT BASELINE, tremor noted in upper extremities    PERTINENT TEST /EXAMS      I have reviewed all available laboratory results.     MEDICATIONS CURRENT   calcium carbonate (TUMS) chewable tablet 500 mg, Daily PRN  pantoprazole (PROTONIX) tablet 40 mg, BID AC  amLODIPine (NORVASC) tablet 5 mg, Daily  rOPINIRole (REQUIP) tablet 0.5 mg, TID  sodium chloride flush 0.9 % injection 5-40 mL, 2 times per day  sodium chloride flush 0.9 % injection 5-40 mL, PRN  0.9 % sodium chloride infusion, PRN  enoxaparin (LOVENOX) injection 40 mg, Daily  ondansetron (ZOFRAN-ODT) disintegrating tablet 4 mg, Q8H PRN   Or  ondansetron (ZOFRAN) injection 4 mg, Q6H PRN  polyethylene glycol (GLYCOLAX) packet 17 g, Daily PRN  acetaminophen (TYLENOL) tablet 650 mg, Q6H PRN   Or  acetaminophen (TYLENOL) suppository 650 mg, Q6H PRN  amitriptyline (ELAVIL) tablet 10 mg, Nightly  atorvastatin (LIPITOR) tablet 40 mg, Daily  carbidopa-levodopa (SINEMET)  MG per tablet 1 tablet, 4x Daily  clopidogrel (PLAVIX) tablet 75 mg, Daily  entacapone (COMTAN) tablet 200 mg, 4x Daily  gabapentin (NEURONTIN) capsule 100 mg, TID  isosorbide mononitrate (IMDUR) extended release tablet 30 mg, Daily  sucralfate (CARAFATE) tablet 1 g, 4x Daily  tamsulosin (FLOMAX) capsule 0.4 mg, Daily      All medication charted and reviewed. CONSULTS      IP CONSULT TO CARDIOLOGY  IP CONSULT TO NEUROLOGY  IP CONSULT TO GI    ASSESSMENT/PLAN       Sharron Gannon is a 79 y.o. male who presents with:    Chest pain  - Stable bradycardia  - Negative work-up. Cardiology signed off. Epigastric discomfort  - Negative work-up. GI signed off. - 1 month follow-up  - Protonix twice daily, calcium carbonate as needed. Parkinson's disease  - Neurology signed off    Monitoring  -Last labs 3/8. Repeat today for surveillance. Dispo  [place] [facility] home accepted, pending insurance P2P    Continue home medications and pain control  Monitor vitals, labs, and imaging  DISPO: pending consults and clinical improvement    --  Mo Connor MD  Emergency Medicine Resident Physician     This dictation was generated by voice recognition computer software. Although all attempts are made to edit the dictation for accuracy, there may be errors in the transcription that are not intended.

## 2023-03-13 NOTE — CARE COORDINATION
Transition planning  Received call from [facility] at [address] [place] [address], she states P2P has been completed and insurance has approved patient for SNF. They are able to accept patient today. Report # [phone]  Fax # [phone] 1600 Transport set for 5:00 pm per [facility]. Updated patient, pt's RN and Ani at [facility]. Faxed H&P,AVS and HENS to [address] [place] [address].     Discharge [facility] Case Management Department  Written by: Ronen Fischer RN    Patient Name: Jonh Anderson  Attending Provider: Selene De Guzman MD  Admit Date: 3/2/2023  1:02 PM  MRN: [id number]  Account: [de-identified]                     : 1940  Discharge Date: 3-      Disposition: SNF    Ronen Fischer RN

## 2023-03-13 NOTE — PROGRESS NOTES
[facility] [employer]  CDU / OBSERVATION ENCOUNTER  ATTENDING NOTE       I performed a history and physical examination of the patient and discussed management with the resident or midlevel provider. I reviewed the resident or midlevel provider's note and agree with the documented findings and plan of care. Any areas of disagreement are noted on the chart. I was personally present for the key portions of any procedures. I have documented in the chart those procedures where I was not present during the key portions. I have reviewed the nurses notes. I agree with the chief complaint, past medical history, past surgical history, allergies, medications, social and family history as documented unless otherwise noted below. The Family history, social history, and ROS are effectively unchanged since admission unless noted elsewhere in the chart. This patient was placed in the observation unit for reevaluation for possible admission to the hospital    Efforts made to completing the peer to peer communication over the weekend were unable to be done. No one available by phone. Will retry today though this is past the time set by insurance company. We will work on other dispositions for safety if unavailable. Patient has not done well in outpatient setting. I believe this is best for patient. Other options are available and we will explore them as well.     Unknown Diogenes CALLAHAN  Attending Emergency  Physician

## 2023-03-13 NOTE — PLAN OF CARE
Problem: Chronic Conditions and Co-morbidities  Goal: Patient's chronic conditions and co-morbidity symptoms are monitored and maintained or improved  Outcome: Completed     Problem: Pain  Goal: Verbalizes/displays adequate comfort level or baseline comfort level  Outcome: Completed     Problem: Safety - Adult  Goal: Free from fall injury  Outcome: Completed     Problem: Discharge Planning  Goal: Discharge to home or other facility with appropriate resources  Outcome: Completed     Problem: Neurosensory - Adult  Goal: Achieves maximal functionality and self care  Outcome: Completed     Problem: Cardiovascular - Adult  Goal: Maintains optimal cardiac output and hemodynamic stability  Outcome: Completed     Problem: Musculoskeletal - Adult  Goal: Return mobility to safest level of function  Outcome: Completed  Goal: Return ADL status to a safe level of function  Outcome: Completed     Problem: Nutrition Deficit:  Goal: Optimize nutritional status  Outcome: Completed     Problem: Skin/Tissue Integrity  Goal: Absence of new skin breakdown  Description: 1. Monitor for areas of redness and/or skin breakdown  2. Assess vascular access sites hourly  3. Every 4-6 hours minimum:  Change oxygen saturation probe site  4. Every 4-6 hours:  If on nasal continuous positive airway pressure, respiratory therapy assess nares and determine need for appliance change or resting period.   Outcome: Completed

## 2023-03-13 NOTE — PROGRESS NOTES
Occupational Therapy  Facility/Department: [address] ORTHO/MED SURG  Occupational Therapy Daily Treatment Note    Name: Sharron Gannon  : 1940  MRN: [id number]  Date of Service: 3/13/2023    Discharge Recommendations:  Patient would benefit from continued therapy after discharge     Patient Diagnosis(es): The primary encounter diagnosis was Chest pain, unspecified type. Diagnoses of Abdominal pain, epigastric and Dysphagia, unspecified type were also pertinent to this visit. 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 Past Surgical History:  has a past surgical history that includes Colonoscopy (2012); shoulder surgery (Right); pr colsc flx w/rmvl of tumor polyp lesion snare tq (N/A, 2017); Colonoscopy (2017); Cholecystectomy, laparoscopic (N/A, 10/29/2022); Esophagogastroduodenoscopy (2023); and Upper gastrointestinal endoscopy (N/A, 2023). Assessment   Performance deficits / Impairments: Decreased functional mobility ; Decreased ADL status; Decreased strength;Decreased safe awareness;Decreased cognition;Decreased endurance;Decreased balance;Decreased high-level IADLs  Assessment: Pt making steady gains towards goals this session and will continue to benefit from further OT services following discharge from acute care hospital  Prognosis: Good  Activity Tolerance  Activity Tolerance: Patient Tolerated treatment well        Plan   Occupational Therapy Plan  Times Per Week: 3-5 x/wk  Current Treatment Recommendations: Strengthening, Balance training, Endurance training, Cognitive reorientation, Pain management, Functional mobility training, Safety education & training, Patient/Caregiver education & training, Equipment evaluation, education, & procurement, Self-Care / ADL, Home management training, Coordination training     Restrictions  Restrictions/Precautions  Restrictions/Precautions: Contact Precautions, Fall Risk  Required Braces or Orthoses?: No  Position Activity Restriction  Other position/activity restrictions: up with assistance, Hx of parkinson's    Subjective   General  Chart Reviewed: Yes  Patient assessed for rehabilitation services?: Yes  Response to previous treatment: Patient with no complaints from previous session  Family / Caregiver Present: No  General Comment  Comments: RN ok'd patient for OT treatment this afternoon. Pt seated upright in bed upon [facility] arrival. Pt  agreeable to OT session and reports no pain at this time       Objective   Safety Devices  Type of Devices: Gait belt;Left in chair;Chair alarm in place;Call light within reach; Patient at risk for falls;Nurse notified  Restraints  Restraints Initially in Place: No  Balance  Sitting: Without support (EOB 2-3 minutes independent, upright in chair 15 minutes MOD I)  Standing: With support (RW SBA for safety, stood 2 minutes x2 stands)  Gait  Overall Level of Assistance: Stand-by assistance  Interventions: Verbal cues; Safety awareness training  Distance (ft):  (transfer to chair)  Assistive Device: Walker, rolling;Gait belt        ADL  Grooming: Modified independent   Grooming Skilled Clinical Factors: Pt completed face washing, neck and hands, oral care while seated upright in chair  UE Bathing: Supervision;Modified independent ; Increased time to complete  LE Bathing: Stand by assistance;Setup; Increased time to complete  UE Dressing: Supervision  UE Dressing Skilled Clinical Factors: Pt doffed and donned gown like a t-shirt with set-up  LE Dressing: Stand by assistance  LE Dressing Skilled Clinical Factors: doff and brett slipper socks  Additional Comments: Pt completed tasks seated in recliner with increased independence noted        Bed mobility  Supine to Sit: Supervision  Sit to Supine: Unable to assess  Scooting: Supervision  Bed Mobility Comments: Pt concluded session seated in chair this session  Transfers  Sit to stand: Stand by assistance  Stand to sit: Stand by assistance  Transfer Comments: utilizing RW requiring min v/c for proper hand placement     Cognition  Overall Cognitive Status: Exceptions  Arousal/Alertness: Appropriate responses to stimuli  Following Commands:  Follows multistep commands with increased time  Attention Span: Attends with cues to redirect  Safety Judgement: Decreased awareness of need for assistance;Decreased awareness of need for safety  Problem Solving: Decreased awareness of errors  Insights: Decreased awareness of deficits  Initiation: Requires cues for some  Orientation  Overall Orientation Status: Within Normal Limits         Education Given To: Patient  Education Provided: Role of Therapy;Transfer Training;ADL Adaptive Strategies  Education Provided Comments: importance of increasing activity  Education Method: Demonstration;Verbal  Barriers to Learning: None  Education Outcome: Verbalized understanding;Demonstrated understanding;Continued education needed    AM-PAC Score  AM-[facility] Inpatient Daily Activity Raw Score: 21 (03/13/23 1606)  AM-PAC Inpatient ADL T-Scale Score : 44.27 (03/13/23 1606)  ADL Inpatient CMS 0-100% Score: 32.79 (03/13/23 1606)  ADL Inpatient CMS G-Code Modifier : CJ (03/13/23 1606)      Goals  Short Term Goals  Time Frame for Short Term Goals: By discharge, pt will:  Short Term Goal 1: Demo SBA for functional transfers and functional mobility with use of LRD for engagement in ADLs/IADLs  Short Term Goal 2: Demo Mod I for UB ADLs with use of adaptive tech/AE PRN  Short Term Goal 3: Demo CGA for LB ADLs and toileting tasks with setup and AE PRN  Short Term Goal 4: Demo 8 min dynamic standing and reaching outside [place] with unilateral hand release and SBA for improved balance during ADL/IADL participation  Short Term Goal 5: Follow 75% of 3-step commands with appropriate initiation and sequencing for improved functional independence with daily occupations       Therapy Time   Individual Concurrent Group Co-treatment   Time In 1505         Time Out 1601         Minutes 56         Timed Code Treatment Minutes: [place], [facility]/L

## 2023-03-15 NOTE — DISCHARGE SUMMARY
CDU Discharge Summary        Patient:  Volodymyr Murray  YOB: 1940    MRN: [id number]   Acct: [de-identified]    Primary Care Physician: JW Shah CNP    Admit date:  3/2/2023 3/2/2023  1:02 PM  Discharge date:  3/13/2023 3/13/2023  5:51 PM       Discharge Diagnoses:   Chronic parkinsons disease - Treated with home medications, will follow up outpatient with PCP, neurology    Acute on chronic epigastric pain,  due to gastritis - Treated with PPI, calcium carbonate, will follow up outpatient with PCP      Discharge Medications:         Medication List        START taking these medications      amLODIPine 5 MG tablet  Commonly known as: NORVASC  Take 1 tablet by mouth daily            CHANGE how you take these medications      pantoprazole 40 MG tablet  Commonly known as: PROTONIX  TAKE 1 TABLET BY MOUTH ONCE DAILY FOR GERD  What changed: Another medication with the same name was removed. Continue taking this medication, and follow the directions you see here. rOPINIRole 0.5 MG tablet  Commonly known as: REQUIP  Take 1 tablet by mouth 3 times daily  What changed:   medication strength  See the new instructions. CONTINUE taking these medications      allopurinol 100 MG tablet  Commonly known as: ZYLOPRIM  TAKE 1 TABLET BY MOUTH ONCE DAILY     amitriptyline 10 MG tablet  Commonly known as: ELAVIL  Take 1 tablet by mouth nightly     butalbital-acetaminophen-caffeine -40 MG per tablet  Commonly known as: FIORICET, ESGIC  Take 1 tab prn only for severe headache. Can repeat after 4 hrs if needed. Max 2 tabs/24 hrs.  Max 4 tabs/week     clopidogrel 75 MG tablet  Commonly known as: PLAVIX  TAKE 1 TABLET BY MOUTH ONCE DAILY     diclofenac sodium 1 % Gel  Commonly known as: VOLTAREN  Apply 2 g topically 2 times daily as needed for Pain (joint pain)     entacapone 200 MG tablet  Commonly known as: COMTAN  TAKE 1 TABLET BY MOUTH 4 TIMES DAILY     gabapentin 100 MG capsule  Commonly known as: NEURONTIN  TAKE 1 CAPSULE BY MOUTH 3 TIMES DAILY FOR NERVES     isosorbide mononitrate 30 MG extended release tablet  Commonly known as: IMDUR  TAKE 1 TABLET BY MOUTH ONCE DAILY FOR HYPERTENSION     magnesium oxide 400 (240 Mg) MG tablet  Commonly known as: MAG-OX  TAKE 1 TABLET BY MOUTH ONCE DAILY     nitroGLYCERIN 0.4 MG SL tablet  Commonly known as: NITROSTAT  up to max of 3 total doses. If no relief after 1 dose, call 911.     sucralfate 1 GM tablet  Commonly known as: Carafate  Take 1 tablet by mouth 4 times daily     tamsulosin 0.4 MG capsule  Commonly known as: FLOMAX  Take 1 capsule by mouth daily            STOP taking these medications      acetaminophen 650 MG extended release tablet  Commonly known as: Tylenol 8 Hour Arthritis Pain     metoprolol tartrate 25 MG tablet  Commonly known as: LOPRESSOR               Where to Get Your Medications        You can get these medications from any pharmacy    Bring a paper prescription for each of these medications  amLODIPine 5 MG tablet  rOPINIRole 0.5 MG tablet         Diet:  No diet orders on file     Activity:  As tolerated    Follow-up:  Call today/tomorrow for a follow up appointment with WJ Gibbons - CNP     Consultants: IP CONSULT TO CARDIOLOGY  IP CONSULT TO NEUROLOGY  IP CONSULT TO GI    Procedures:      Diagnostic Test:         Physical Exam:    General appearance - NAD, AOx 3  Lungs -CTA Bilateraly  Heart - Normal S1/S2  Abdomen - Soft NT/ND  Neurological:  No focal motor or sensory weakness. Extremities - Cap refil <2 sec in all ext. Skin -warm, dry      Hospital Course:   Jimmy Landry originally presented to the hospital on 3/2/2023  1:02 PM. Labs and imaging were followed daily. At time of discharge, Jimmy Landry was tolerating a PO intake well, passing flatus, urinating adequately, ambulating and had adequate analgesia on oral pain medications. He was assessed by cardiology, gastroenterology, neurology - no new workup.  His requip dose was adjusted by neurology and well tolerated. He was assessed by PT/OT. Based on their findings, discharged to [place] [facility] [address] [facility]. He is medically stable to be discharged. Clinical course has improved. I feel the patient can be safely discharged with outpatient follow up. Instructions have been given for the patient to return to the ED for any worsening of the symptoms, including but not limited to increased pain, shortness of breath, weakness, or any deterioration of their current condition . Disposition: SNF    Condition: Good      Patient stable and ready for discharge home. I have discussed plan of care with patient and they are in understanding. They were instructed to read discharge paperwork. All of their questions and concerns were addressed.      Patient states that they understand the plan and agree with the plan      Time Spent: Geronimo Chiu MD  Emergency Medicine Resident Physician

## 2023-03-27 ENCOUNTER — TELEPHONE (OUTPATIENT)
Dept: GASTROENTEROLOGY | Age: 83
End: 2023-03-27

## 2023-03-27 NOTE — TELEPHONE ENCOUNTER
Writer called patient to schedule an appointment from a [facility]'s follow up and no answer. Writer had to leave patient a message to call back and schedule with Dr Fouzia Soni.

## 2023-04-03 NOTE — ED NOTES
Report received from [facility]. All questions answered.       Stephania Peabody, RN  03/02/23 0190 .

## 2023-04-09 ENCOUNTER — APPOINTMENT (OUTPATIENT)
Dept: GENERAL RADIOLOGY | Age: 83
DRG: 392 | End: 2023-04-09
Payer: MEDICARE

## 2023-04-09 ENCOUNTER — HOSPITAL ENCOUNTER (INPATIENT)
Age: 83
LOS: 2 days | Discharge: HOME | DRG: 392 | End: 2023-04-14
Attending: EMERGENCY MEDICINE | Admitting: EMERGENCY MEDICINE
Payer: MEDICARE

## 2023-04-09 DIAGNOSIS — R11.0 NAUSEA: Primary | ICD-10-CM

## 2023-04-09 PROBLEM — Z78.9 UNABLE TO CARE FOR SELF: Status: ACTIVE | Noted: 2023-04-09

## 2023-04-09 LAB
ABSOLUTE EOS #: 0.19 K/UL (ref 0–0.44)
ABSOLUTE IMMATURE GRANULOCYTE: <0.03 K/UL (ref 0–0.3)
ABSOLUTE LYMPH #: 1.82 K/UL (ref 1.1–3.7)
ABSOLUTE MONO #: 0.61 K/UL (ref 0.1–1.2)
ALBUMIN SERPL-MCNC: 4.3 G/DL (ref 3.5–5.2)
ALBUMIN/GLOBULIN RATIO: 1.3 (ref 1–2.5)
ALP SERPL-CCNC: 120 U/L (ref 40–129)
ALT SERPL-CCNC: 14 U/L (ref 5–41)
ANION GAP SERPL CALCULATED.3IONS-SCNC: 11 MMOL/L (ref 9–17)
AST SERPL-CCNC: 24 U/L
BASOPHILS # BLD: 1 % (ref 0–2)
BASOPHILS ABSOLUTE: 0.03 K/UL (ref 0–0.2)
BILIRUB DIRECT SERPL-MCNC: 0.2 MG/DL
BILIRUB INDIRECT SERPL-MCNC: 0.4 MG/DL (ref 0–1)
BILIRUB SERPL-MCNC: 0.6 MG/DL (ref 0.3–1.2)
BUN SERPL-MCNC: 13 MG/DL (ref 8–23)
CALCIUM SERPL-MCNC: 9.6 MG/DL (ref 8.6–10.4)
CHLORIDE SERPL-SCNC: 101 MMOL/L (ref 98–107)
CO2 SERPL-SCNC: 24 MMOL/L (ref 20–31)
CREAT SERPL-MCNC: 1.45 MG/DL (ref 0.7–1.2)
EOSINOPHILS RELATIVE PERCENT: 3 % (ref 1–4)
GFR SERPL CREATININE-BSD FRML MDRD: 48 ML/MIN/1.73M2
GLUCOSE SERPL-MCNC: 129 MG/DL (ref 70–99)
HCT VFR BLD AUTO: 43 % (ref 40.7–50.3)
HGB BLD-MCNC: 14.7 G/DL (ref 13–17)
IMMATURE GRANULOCYTES: 0 %
LIPASE SERPL-CCNC: 22 U/L (ref 13–60)
LYMPHOCYTES # BLD: 30 % (ref 24–43)
MCH RBC QN AUTO: 32.1 PG (ref 25.2–33.5)
MCHC RBC AUTO-ENTMCNC: 34.2 G/DL (ref 28.4–34.8)
MCV RBC AUTO: 93.9 FL (ref 82.6–102.9)
MONOCYTES # BLD: 10 % (ref 3–12)
NRBC AUTOMATED: 0 PER 100 WBC
PDW BLD-RTO: 14 % (ref 11.8–14.4)
PLATELET # BLD AUTO: 210 K/UL (ref 138–453)
PMV BLD AUTO: 8.9 FL (ref 8.1–13.5)
POTASSIUM SERPL-SCNC: 4 MMOL/L (ref 3.7–5.3)
PROT SERPL-MCNC: 7.5 G/DL (ref 6.4–8.3)
RBC # BLD: 4.58 M/UL (ref 4.21–5.77)
SARS-COV-2 RDRP RESP QL NAA+PROBE: NOT DETECTED
SEG NEUTROPHILS: 56 % (ref 36–65)
SEGMENTED NEUTROPHILS ABSOLUTE COUNT: 3.35 K/UL (ref 1.5–8.1)
SODIUM SERPL-SCNC: 136 MMOL/L (ref 135–144)
SPECIMEN DESCRIPTION: NORMAL
TROPONIN I SERPL DL<=0.01 NG/ML-MCNC: 26 NG/L (ref 0–22)
WBC # BLD AUTO: 6 K/UL (ref 3.5–11.3)

## 2023-04-09 PROCEDURE — 6370000000 HC RX 637 (ALT 250 FOR IP): Performed by: STUDENT IN AN ORGANIZED HEALTH CARE EDUCATION/TRAINING PROGRAM

## 2023-04-09 PROCEDURE — 85025 COMPLETE CBC W/AUTO DIFF WBC: CPT

## 2023-04-09 PROCEDURE — 93005 ELECTROCARDIOGRAM TRACING: CPT | Performed by: STUDENT IN AN ORGANIZED HEALTH CARE EDUCATION/TRAINING PROGRAM

## 2023-04-09 PROCEDURE — 99285 EMERGENCY DEPT VISIT HI MDM: CPT

## 2023-04-09 PROCEDURE — G0378 HOSPITAL OBSERVATION PER HR: HCPCS

## 2023-04-09 PROCEDURE — 87635 SARS-COV-2 COVID-19 AMP PRB: CPT

## 2023-04-09 PROCEDURE — 80076 HEPATIC FUNCTION PANEL: CPT

## 2023-04-09 PROCEDURE — 80048 BASIC METABOLIC PNL TOTAL CA: CPT

## 2023-04-09 PROCEDURE — 84484 ASSAY OF TROPONIN QUANT: CPT

## 2023-04-09 PROCEDURE — 83690 ASSAY OF LIPASE: CPT

## 2023-04-09 PROCEDURE — 71045 X-RAY EXAM CHEST 1 VIEW: CPT

## 2023-04-09 RX ORDER — ENOXAPARIN SODIUM 100 MG/ML
40 INJECTION SUBCUTANEOUS DAILY
Status: DISCONTINUED | OUTPATIENT
Start: 2023-04-09 | End: 2023-04-14 | Stop reason: HOSPADM

## 2023-04-09 RX ORDER — ALLOPURINOL 100 MG/1
100 TABLET ORAL DAILY
Status: DISCONTINUED | OUTPATIENT
Start: 2023-04-09 | End: 2023-04-14 | Stop reason: HOSPADM

## 2023-04-09 RX ORDER — POLYETHYLENE GLYCOL 3350 17 G/17G
17 POWDER, FOR SOLUTION ORAL DAILY PRN
Status: DISCONTINUED | OUTPATIENT
Start: 2023-04-09 | End: 2023-04-12

## 2023-04-09 RX ORDER — ONDANSETRON 2 MG/ML
4 INJECTION INTRAMUSCULAR; INTRAVENOUS EVERY 6 HOURS PRN
Status: DISCONTINUED | OUTPATIENT
Start: 2023-04-09 | End: 2023-04-11

## 2023-04-09 RX ORDER — ACETAMINOPHEN 650 MG/1
650 SUPPOSITORY RECTAL EVERY 6 HOURS PRN
Status: DISCONTINUED | OUTPATIENT
Start: 2023-04-09 | End: 2023-04-14 | Stop reason: HOSPADM

## 2023-04-09 RX ORDER — ONDANSETRON 4 MG/1
4 TABLET, ORALLY DISINTEGRATING ORAL 3 TIMES DAILY PRN
Qty: 21 TABLET | Refills: 0 | Status: SHIPPED | OUTPATIENT
Start: 2023-04-09

## 2023-04-09 RX ORDER — ONDANSETRON 4 MG/1
4 TABLET, ORALLY DISINTEGRATING ORAL EVERY 8 HOURS PRN
Status: DISCONTINUED | OUTPATIENT
Start: 2023-04-09 | End: 2023-04-11

## 2023-04-09 RX ORDER — ACETAMINOPHEN 325 MG/1
650 TABLET ORAL EVERY 6 HOURS PRN
Status: DISCONTINUED | OUTPATIENT
Start: 2023-04-09 | End: 2023-04-14 | Stop reason: HOSPADM

## 2023-04-09 RX ORDER — ATORVASTATIN CALCIUM 40 MG/1
40 TABLET, FILM COATED ORAL DAILY
Status: DISCONTINUED | OUTPATIENT
Start: 2023-04-09 | End: 2023-04-14 | Stop reason: HOSPADM

## 2023-04-09 RX ORDER — ENTACAPONE 200 MG/1
200 TABLET ORAL 4 TIMES DAILY
Status: DISCONTINUED | OUTPATIENT
Start: 2023-04-09 | End: 2023-04-14 | Stop reason: HOSPADM

## 2023-04-09 RX ORDER — PANTOPRAZOLE SODIUM 40 MG/1
40 TABLET, DELAYED RELEASE ORAL
Status: DISCONTINUED | OUTPATIENT
Start: 2023-04-10 | End: 2023-04-14 | Stop reason: HOSPADM

## 2023-04-09 RX ORDER — ONDANSETRON 4 MG/1
4 TABLET, ORALLY DISINTEGRATING ORAL ONCE
Status: COMPLETED | OUTPATIENT
Start: 2023-04-09 | End: 2023-04-09

## 2023-04-09 RX ORDER — TAMSULOSIN HYDROCHLORIDE 0.4 MG/1
0.4 CAPSULE ORAL DAILY
Status: DISCONTINUED | OUTPATIENT
Start: 2023-04-09 | End: 2023-04-14 | Stop reason: HOSPADM

## 2023-04-09 RX ORDER — ROPINIROLE 0.25 MG/1
0.5 TABLET, FILM COATED ORAL 3 TIMES DAILY
Status: DISCONTINUED | OUTPATIENT
Start: 2023-04-09 | End: 2023-04-14 | Stop reason: HOSPADM

## 2023-04-09 RX ORDER — AMITRIPTYLINE HYDROCHLORIDE 10 MG/1
10 TABLET, FILM COATED ORAL NIGHTLY
Status: DISCONTINUED | OUTPATIENT
Start: 2023-04-09 | End: 2023-04-14 | Stop reason: HOSPADM

## 2023-04-09 RX ORDER — CLOPIDOGREL BISULFATE 75 MG/1
75 TABLET ORAL DAILY
Status: DISCONTINUED | OUTPATIENT
Start: 2023-04-09 | End: 2023-04-14 | Stop reason: HOSPADM

## 2023-04-09 RX ORDER — SODIUM CHLORIDE 0.9 % (FLUSH) 0.9 %
5-40 SYRINGE (ML) INJECTION PRN
Status: DISCONTINUED | OUTPATIENT
Start: 2023-04-09 | End: 2023-04-14 | Stop reason: HOSPADM

## 2023-04-09 RX ORDER — SUCRALFATE 1 G/1
1 TABLET ORAL 4 TIMES DAILY
Status: DISCONTINUED | OUTPATIENT
Start: 2023-04-09 | End: 2023-04-14 | Stop reason: HOSPADM

## 2023-04-09 RX ORDER — SODIUM CHLORIDE 0.9 % (FLUSH) 0.9 %
5-40 SYRINGE (ML) INJECTION EVERY 12 HOURS SCHEDULED
Status: DISCONTINUED | OUTPATIENT
Start: 2023-04-09 | End: 2023-04-14 | Stop reason: HOSPADM

## 2023-04-09 RX ORDER — LANOLIN ALCOHOL/MO/W.PET/CERES
400 CREAM (GRAM) TOPICAL DAILY
Status: DISCONTINUED | OUTPATIENT
Start: 2023-04-09 | End: 2023-04-14 | Stop reason: HOSPADM

## 2023-04-09 RX ORDER — SODIUM CHLORIDE 9 MG/ML
INJECTION, SOLUTION INTRAVENOUS PRN
Status: DISCONTINUED | OUTPATIENT
Start: 2023-04-09 | End: 2023-04-14 | Stop reason: HOSPADM

## 2023-04-09 RX ORDER — ISOSORBIDE MONONITRATE 30 MG/1
30 TABLET, EXTENDED RELEASE ORAL DAILY
Status: DISCONTINUED | OUTPATIENT
Start: 2023-04-09 | End: 2023-04-14 | Stop reason: HOSPADM

## 2023-04-09 RX ADMIN — ONDANSETRON 4 MG: 4 TABLET, ORALLY DISINTEGRATING ORAL at 18:01

## 2023-04-09 RX ADMIN — ENTACAPONE 200 MG: 200 TABLET, FILM COATED ORAL at 17:48

## 2023-04-09 RX ADMIN — ONDANSETRON 4 MG: 4 TABLET, ORALLY DISINTEGRATING ORAL at 09:15

## 2023-04-09 RX ADMIN — CARBIDOPA AND LEVODOPA 1 TABLET: 25; 100 TABLET ORAL at 21:10

## 2023-04-09 RX ADMIN — ENTACAPONE 200 MG: 200 TABLET, FILM COATED ORAL at 21:10

## 2023-04-09 RX ADMIN — ROPINIROLE HYDROCHLORIDE 0.5 MG: 0.25 TABLET, FILM COATED ORAL at 21:10

## 2023-04-09 RX ADMIN — CARBIDOPA AND LEVODOPA 1 TABLET: 25; 100 TABLET ORAL at 15:59

## 2023-04-09 RX ADMIN — SUCRALFATE 1 G: 1 TABLET ORAL at 21:09

## 2023-04-09 RX ADMIN — AMITRIPTYLINE HYDROCHLORIDE 10 MG: 10 TABLET, FILM COATED ORAL at 21:10

## 2023-04-09 ASSESSMENT — ENCOUNTER SYMPTOMS
SHORTNESS OF BREATH: 0
DIARRHEA: 0
COUGH: 0
NAUSEA: 1
BACK PAIN: 0
SORE THROAT: 0
RHINORRHEA: 0
VOMITING: 0
ABDOMINAL PAIN: 1

## 2023-04-09 ASSESSMENT — PAIN - FUNCTIONAL ASSESSMENT: PAIN_FUNCTIONAL_ASSESSMENT: 0-10

## 2023-04-09 ASSESSMENT — PAIN DESCRIPTION - LOCATION: LOCATION: CHEST;THROAT

## 2023-04-09 ASSESSMENT — PAIN SCALES - GENERAL: PAINLEVEL_OUTOF10: 6

## 2023-04-10 LAB
ABSOLUTE EOS #: 0.19 K/UL (ref 0–0.44)
ABSOLUTE IMMATURE GRANULOCYTE: <0.03 K/UL (ref 0–0.3)
ABSOLUTE LYMPH #: 1.45 K/UL (ref 1.1–3.7)
ABSOLUTE MONO #: 0.61 K/UL (ref 0.1–1.2)
ANION GAP SERPL CALCULATED.3IONS-SCNC: 14 MMOL/L (ref 9–17)
BASOPHILS # BLD: 0 % (ref 0–2)
BASOPHILS ABSOLUTE: <0.03 K/UL (ref 0–0.2)
BILIRUBIN URINE: NEGATIVE
BUN SERPL-MCNC: 14 MG/DL (ref 8–23)
CALCIUM SERPL-MCNC: 9 MG/DL (ref 8.6–10.4)
CASTS UA: ABNORMAL /LPF (ref 0–8)
CHLORIDE SERPL-SCNC: 104 MMOL/L (ref 98–107)
CO2 SERPL-SCNC: 23 MMOL/L (ref 20–31)
COLOR: ABNORMAL
CREAT SERPL-MCNC: 1.38 MG/DL (ref 0.7–1.2)
EKG ATRIAL RATE: 78 BPM
EKG P AXIS: 20 DEGREES
EKG P-R INTERVAL: 180 MS
EKG Q-T INTERVAL: 412 MS
EKG QRS DURATION: 108 MS
EKG QTC CALCULATION (BAZETT): 469 MS
EKG R AXIS: 34 DEGREES
EKG T AXIS: -11 DEGREES
EKG VENTRICULAR RATE: 78 BPM
EOSINOPHILS RELATIVE PERCENT: 4 % (ref 1–4)
EPITHELIAL CELLS UA: ABNORMAL /HPF (ref 0–5)
GFR SERPL CREATININE-BSD FRML MDRD: 51 ML/MIN/1.73M2
GLUCOSE SERPL-MCNC: 119 MG/DL (ref 70–99)
GLUCOSE UR STRIP.AUTO-MCNC: NEGATIVE MG/DL
HCT VFR BLD AUTO: 42.3 % (ref 40.7–50.3)
HGB BLD-MCNC: 14.1 G/DL (ref 13–17)
IMMATURE GRANULOCYTES: 0 %
KETONES UR STRIP.AUTO-MCNC: ABNORMAL MG/DL
LEUKOCYTE ESTERASE UR QL STRIP.AUTO: NEGATIVE
LYMPHOCYTES # BLD: 29 % (ref 24–43)
MCH RBC QN AUTO: 31.3 PG (ref 25.2–33.5)
MCHC RBC AUTO-ENTMCNC: 33.3 G/DL (ref 28.4–34.8)
MCV RBC AUTO: 94 FL (ref 82.6–102.9)
MONOCYTES # BLD: 12 % (ref 3–12)
NITRITE UR QL STRIP.AUTO: NEGATIVE
NRBC AUTOMATED: 0 PER 100 WBC
PDW BLD-RTO: 13.6 % (ref 11.8–14.4)
PLATELET # BLD AUTO: 188 K/UL (ref 138–453)
PMV BLD AUTO: 9.1 FL (ref 8.1–13.5)
POTASSIUM SERPL-SCNC: 4.1 MMOL/L (ref 3.7–5.3)
PROT UR STRIP.AUTO-MCNC: 5.5 MG/DL (ref 5–8)
PROT UR STRIP.AUTO-MCNC: ABNORMAL MG/DL
RBC # BLD: 4.5 M/UL (ref 4.21–5.77)
RBC CLUMPS #/AREA URNS AUTO: ABNORMAL /HPF (ref 0–4)
SEG NEUTROPHILS: 55 % (ref 36–65)
SEGMENTED NEUTROPHILS ABSOLUTE COUNT: 2.8 K/UL (ref 1.5–8.1)
SODIUM SERPL-SCNC: 141 MMOL/L (ref 135–144)
SPECIFIC GRAVITY UA: 1.02 (ref 1–1.03)
TURBIDITY: CLEAR
URINE HGB: NEGATIVE
UROBILINOGEN, URINE: NORMAL
WBC # BLD AUTO: 5.1 K/UL (ref 3.5–11.3)
WBC UA: ABNORMAL /HPF (ref 0–5)

## 2023-04-10 PROCEDURE — 97530 THERAPEUTIC ACTIVITIES: CPT

## 2023-04-10 PROCEDURE — 6370000000 HC RX 637 (ALT 250 FOR IP): Performed by: STUDENT IN AN ORGANIZED HEALTH CARE EDUCATION/TRAINING PROGRAM

## 2023-04-10 PROCEDURE — G0378 HOSPITAL OBSERVATION PER HR: HCPCS

## 2023-04-10 PROCEDURE — 36415 COLL VENOUS BLD VENIPUNCTURE: CPT

## 2023-04-10 PROCEDURE — 6370000000 HC RX 637 (ALT 250 FOR IP)

## 2023-04-10 PROCEDURE — 81001 URINALYSIS AUTO W/SCOPE: CPT

## 2023-04-10 PROCEDURE — 6360000002 HC RX W HCPCS: Performed by: STUDENT IN AN ORGANIZED HEALTH CARE EDUCATION/TRAINING PROGRAM

## 2023-04-10 PROCEDURE — 80048 BASIC METABOLIC PNL TOTAL CA: CPT

## 2023-04-10 PROCEDURE — 97116 GAIT TRAINING THERAPY: CPT

## 2023-04-10 PROCEDURE — 96372 THER/PROPH/DIAG INJ SC/IM: CPT

## 2023-04-10 PROCEDURE — 97161 PT EVAL LOW COMPLEX 20 MIN: CPT

## 2023-04-10 PROCEDURE — 85025 COMPLETE CBC W/AUTO DIFF WBC: CPT

## 2023-04-10 RX ORDER — KETOROLAC TROMETHAMINE 30 MG/ML
15 INJECTION, SOLUTION INTRAMUSCULAR; INTRAVENOUS ONCE
Status: DISCONTINUED | OUTPATIENT
Start: 2023-04-10 | End: 2023-04-10

## 2023-04-10 RX ORDER — IBUPROFEN 800 MG/1
800 TABLET ORAL ONCE
Status: COMPLETED | OUTPATIENT
Start: 2023-04-10 | End: 2023-04-10

## 2023-04-10 RX ADMIN — DESMOPRESSIN ACETATE 40 MG: 0.2 TABLET ORAL at 09:29

## 2023-04-10 RX ADMIN — CARBIDOPA AND LEVODOPA 1 TABLET: 25; 100 TABLET ORAL at 20:27

## 2023-04-10 RX ADMIN — ROPINIROLE HYDROCHLORIDE 0.5 MG: 0.25 TABLET, FILM COATED ORAL at 09:29

## 2023-04-10 RX ADMIN — CARBIDOPA AND LEVODOPA 1 TABLET: 25; 100 TABLET ORAL at 09:29

## 2023-04-10 RX ADMIN — SUCRALFATE 1 G: 1 TABLET ORAL at 18:04

## 2023-04-10 RX ADMIN — ACETAMINOPHEN 650 MG: 325 TABLET ORAL at 03:45

## 2023-04-10 RX ADMIN — IBUPROFEN 800 MG: 800 TABLET, FILM COATED ORAL at 11:23

## 2023-04-10 RX ADMIN — CLOPIDOGREL BISULFATE 75 MG: 75 TABLET, FILM COATED ORAL at 09:29

## 2023-04-10 RX ADMIN — SUCRALFATE 1 G: 1 TABLET ORAL at 14:44

## 2023-04-10 RX ADMIN — PANTOPRAZOLE SODIUM 40 MG: 40 TABLET, DELAYED RELEASE ORAL at 09:29

## 2023-04-10 RX ADMIN — DICLOFENAC 2 G: 10 GEL TOPICAL at 17:49

## 2023-04-10 RX ADMIN — SUCRALFATE 1 G: 1 TABLET ORAL at 20:27

## 2023-04-10 RX ADMIN — ENTACAPONE 200 MG: 200 TABLET, FILM COATED ORAL at 17:41

## 2023-04-10 RX ADMIN — ENTACAPONE 200 MG: 200 TABLET, FILM COATED ORAL at 14:11

## 2023-04-10 RX ADMIN — CARBIDOPA AND LEVODOPA 1 TABLET: 25; 100 TABLET ORAL at 17:41

## 2023-04-10 RX ADMIN — TAMSULOSIN HYDROCHLORIDE 0.4 MG: 0.4 CAPSULE ORAL at 09:29

## 2023-04-10 RX ADMIN — ENTACAPONE 200 MG: 200 TABLET, FILM COATED ORAL at 20:28

## 2023-04-10 RX ADMIN — ALLOPURINOL 100 MG: 100 TABLET ORAL at 09:29

## 2023-04-10 RX ADMIN — ISOSORBIDE MONONITRATE 30 MG: 30 TABLET, EXTENDED RELEASE ORAL at 09:29

## 2023-04-10 RX ADMIN — ROPINIROLE HYDROCHLORIDE 0.5 MG: 0.25 TABLET, FILM COATED ORAL at 20:27

## 2023-04-10 RX ADMIN — SUCRALFATE 1 G: 1 TABLET ORAL at 09:29

## 2023-04-10 RX ADMIN — ROPINIROLE HYDROCHLORIDE 0.5 MG: 0.25 TABLET, FILM COATED ORAL at 14:11

## 2023-04-10 RX ADMIN — ENOXAPARIN SODIUM 40 MG: 100 INJECTION SUBCUTANEOUS at 09:30

## 2023-04-10 RX ADMIN — CARBIDOPA AND LEVODOPA 1 TABLET: 25; 100 TABLET ORAL at 14:11

## 2023-04-10 RX ADMIN — ENTACAPONE 200 MG: 200 TABLET, FILM COATED ORAL at 09:29

## 2023-04-10 RX ADMIN — MAGNESIUM GLUCONATE 500 MG ORAL TABLET 400 MG: 500 TABLET ORAL at 09:29

## 2023-04-10 RX ADMIN — AMITRIPTYLINE HYDROCHLORIDE 10 MG: 10 TABLET, FILM COATED ORAL at 20:27

## 2023-04-10 RX ADMIN — ONDANSETRON 4 MG: 4 TABLET, ORALLY DISINTEGRATING ORAL at 14:45

## 2023-04-10 RX ADMIN — ACETAMINOPHEN 650 MG: 325 TABLET ORAL at 20:30

## 2023-04-10 ASSESSMENT — PAIN DESCRIPTION - LOCATION
LOCATION: RIB CAGE
LOCATION: HEAD

## 2023-04-10 ASSESSMENT — PAIN DESCRIPTION - ORIENTATION
ORIENTATION: RIGHT
ORIENTATION: RIGHT

## 2023-04-10 ASSESSMENT — PAIN DESCRIPTION - DESCRIPTORS
DESCRIPTORS: ACHING
DESCRIPTORS: SORE
DESCRIPTORS: PATIENT UNABLE TO DESCRIBE

## 2023-04-10 ASSESSMENT — PAIN DESCRIPTION - FREQUENCY: FREQUENCY: INTERMITTENT

## 2023-04-10 ASSESSMENT — PAIN SCALES - GENERAL
PAINLEVEL_OUTOF10: 5
PAINLEVEL_OUTOF10: 5
PAINLEVEL_OUTOF10: 4
PAINLEVEL_OUTOF10: 0
PAINLEVEL_OUTOF10: 5
PAINLEVEL_OUTOF10: 5

## 2023-04-10 ASSESSMENT — PAIN DESCRIPTION - PAIN TYPE: TYPE: ACUTE PAIN

## 2023-04-10 NOTE — DISCHARGE INSTR - COC
Continuity of Care Form    Patient Name: Finesse Mcgovern   :  1940  MRN:  [id number]    Admit date:  2023  Discharge date:  23    Code Status Order: Full Code   Advance Directives:     Admitting Physician:  Jonnie Canela MD  PCP: Merry Zuñiga, JW Perrin CNP    Discharging Nurse: [facility] Unit/Room#: 7039/4300-96  Discharging Unit Phone Number: [phone]    Emergency Contact:   Extended Emergency Contact Information  Primary Emergency Contact: Jhony Gill  Address: Andrae Vasquez of [address] [place] [address] Phone: [phone]  Relation: Child  Secondary Emergency Contact: Dave Mccarthy  Address: Andrae Vasquez of [address] [place] [address] Phone: [phone]  Mobile Phone: [phone]  Relation: Other    Past Surgical History:  Past Surgical History:   Procedure Laterality Date    CHOLECYSTECTOMY, LAPAROSCOPIC N/A 10/29/2022    LAPROSCOPIC CHOLECYSTECTOMY performed by Jenni Ortiz DO at [address] [place] [address]  2012    poor prep, tubular adenoma    COLONOSCOPY  2017    diverticulosis, multiple polyps as described, spot marking done, pathology-tubular adenoma x 4    ESOPHAGOGASTRODUODENOSCOPY  2023    [place] COLSC FLX W/RMVL OF TUMOR POLYP LESION SNARE TQ N/A 2017    COLONOSCOPY POLYPECTOMY SNARE/COLD BIOPSY with tatooing and apc transverse colon performed by Sumeet Ta MD at [id number] [place] [address] Right     rotator cuff    UPPER GASTROINTESTINAL ENDOSCOPY N/A 2023    EGD ESOPHAGOGASTRODUODENOSCOPY performed by Jaqueline Gu MD at [facility] History:   Immunization History   Administered Date(s) Administered    COVID-19, PFIZER Bivalent BOOSTER, DO NOT Dilute, (age 12y+), IM, 30 mcg/0.3 mL 2022    COVID-19, PFIZER GRAY top, DO NOT Dilute, (age 15 y+), IM, 30 mcg/0.3 mL 2022    COVID-19, PFIZER PURPLE top, DILUTE for use, (age 15 y+), 30mcg/0.3mL 2021, 2021, 2021    Influenza 2012    Influenza, FLUAD,

## 2023-04-10 NOTE — PLAN OF CARE
Problem: Chronic Conditions and Co-morbidities  Goal: Patient's chronic conditions and co-morbidity symptoms are monitored and maintained or improved  Outcome: Progressing  Flowsheets (Taken 4/10/2023 0800)  Care Plan - Patient's Chronic Conditions and Co-Morbidity Symptoms are Monitored and Maintained or Improved: Monitor and assess patient's chronic conditions and comorbid symptoms for stability, deterioration, or improvement     Problem: Pain  Goal: Verbalizes/displays adequate comfort level or baseline comfort level  Outcome: Progressing     Problem: Safety - Adult  Goal: Free from fall injury  Outcome: Progressing

## 2023-04-10 NOTE — CARE COORDINATION
Spoke with Ani at [facility], they can accept and will start precert when PT/OT evals available

## 2023-04-11 PROCEDURE — 6360000002 HC RX W HCPCS: Performed by: STUDENT IN AN ORGANIZED HEALTH CARE EDUCATION/TRAINING PROGRAM

## 2023-04-11 PROCEDURE — 97166 OT EVAL MOD COMPLEX 45 MIN: CPT

## 2023-04-11 PROCEDURE — G0378 HOSPITAL OBSERVATION PER HR: HCPCS

## 2023-04-11 PROCEDURE — 6370000000 HC RX 637 (ALT 250 FOR IP)

## 2023-04-11 PROCEDURE — 97530 THERAPEUTIC ACTIVITIES: CPT

## 2023-04-11 PROCEDURE — 6370000000 HC RX 637 (ALT 250 FOR IP): Performed by: STUDENT IN AN ORGANIZED HEALTH CARE EDUCATION/TRAINING PROGRAM

## 2023-04-11 PROCEDURE — 96372 THER/PROPH/DIAG INJ SC/IM: CPT

## 2023-04-11 RX ORDER — ONDANSETRON 4 MG/1
4 TABLET, ORALLY DISINTEGRATING ORAL EVERY 8 HOURS
Status: DISCONTINUED | OUTPATIENT
Start: 2023-04-11 | End: 2023-04-14 | Stop reason: HOSPADM

## 2023-04-11 RX ORDER — ONDANSETRON 2 MG/ML
4 INJECTION INTRAMUSCULAR; INTRAVENOUS EVERY 8 HOURS
Status: DISCONTINUED | OUTPATIENT
Start: 2023-04-11 | End: 2023-04-14 | Stop reason: HOSPADM

## 2023-04-11 RX ADMIN — ALLOPURINOL 100 MG: 100 TABLET ORAL at 09:41

## 2023-04-11 RX ADMIN — ENTACAPONE 200 MG: 200 TABLET, FILM COATED ORAL at 20:20

## 2023-04-11 RX ADMIN — ACETAMINOPHEN 650 MG: 325 TABLET ORAL at 11:54

## 2023-04-11 RX ADMIN — DESMOPRESSIN ACETATE 40 MG: 0.2 TABLET ORAL at 09:41

## 2023-04-11 RX ADMIN — ROPINIROLE HYDROCHLORIDE 0.5 MG: 0.25 TABLET, FILM COATED ORAL at 16:14

## 2023-04-11 RX ADMIN — ONDANSETRON 4 MG: 4 TABLET, ORALLY DISINTEGRATING ORAL at 09:39

## 2023-04-11 RX ADMIN — AMITRIPTYLINE HYDROCHLORIDE 10 MG: 10 TABLET, FILM COATED ORAL at 20:19

## 2023-04-11 RX ADMIN — ACETAMINOPHEN 650 MG: 325 TABLET ORAL at 20:20

## 2023-04-11 RX ADMIN — ISOSORBIDE MONONITRATE 30 MG: 30 TABLET, EXTENDED RELEASE ORAL at 09:41

## 2023-04-11 RX ADMIN — SUCRALFATE 1 G: 1 TABLET ORAL at 13:58

## 2023-04-11 RX ADMIN — SUCRALFATE 1 G: 1 TABLET ORAL at 18:14

## 2023-04-11 RX ADMIN — TAMSULOSIN HYDROCHLORIDE 0.4 MG: 0.4 CAPSULE ORAL at 09:40

## 2023-04-11 RX ADMIN — CARBIDOPA AND LEVODOPA 1 TABLET: 25; 100 TABLET ORAL at 13:20

## 2023-04-11 RX ADMIN — CARBIDOPA AND LEVODOPA 1 TABLET: 25; 100 TABLET ORAL at 20:20

## 2023-04-11 RX ADMIN — ENTACAPONE 200 MG: 200 TABLET, FILM COATED ORAL at 18:14

## 2023-04-11 RX ADMIN — MAGNESIUM GLUCONATE 500 MG ORAL TABLET 400 MG: 500 TABLET ORAL at 09:41

## 2023-04-11 RX ADMIN — SUCRALFATE 1 G: 1 TABLET ORAL at 09:39

## 2023-04-11 RX ADMIN — ENTACAPONE 200 MG: 200 TABLET, FILM COATED ORAL at 13:20

## 2023-04-11 RX ADMIN — ONDANSETRON 4 MG: 4 TABLET, ORALLY DISINTEGRATING ORAL at 16:09

## 2023-04-11 RX ADMIN — DICLOFENAC 2 G: 10 GEL TOPICAL at 09:49

## 2023-04-11 RX ADMIN — CLOPIDOGREL BISULFATE 75 MG: 75 TABLET, FILM COATED ORAL at 09:41

## 2023-04-11 RX ADMIN — ENTACAPONE 200 MG: 200 TABLET, FILM COATED ORAL at 09:42

## 2023-04-11 RX ADMIN — SUCRALFATE 1 G: 1 TABLET ORAL at 20:20

## 2023-04-11 RX ADMIN — CARBIDOPA AND LEVODOPA 1 TABLET: 25; 100 TABLET ORAL at 18:14

## 2023-04-11 RX ADMIN — PANTOPRAZOLE SODIUM 40 MG: 40 TABLET, DELAYED RELEASE ORAL at 07:50

## 2023-04-11 RX ADMIN — ROPINIROLE HYDROCHLORIDE 0.5 MG: 0.25 TABLET, FILM COATED ORAL at 20:20

## 2023-04-11 RX ADMIN — ENOXAPARIN SODIUM 40 MG: 100 INJECTION SUBCUTANEOUS at 09:38

## 2023-04-11 RX ADMIN — ROPINIROLE HYDROCHLORIDE 0.5 MG: 0.25 TABLET, FILM COATED ORAL at 09:40

## 2023-04-11 RX ADMIN — CARBIDOPA AND LEVODOPA 1 TABLET: 25; 100 TABLET ORAL at 09:41

## 2023-04-11 ASSESSMENT — PAIN SCALES - GENERAL
PAINLEVEL_OUTOF10: 5
PAINLEVEL_OUTOF10: 5
PAINLEVEL_OUTOF10: 0
PAINLEVEL_OUTOF10: 3
PAINLEVEL_OUTOF10: 5
PAINLEVEL_OUTOF10: 3

## 2023-04-11 ASSESSMENT — PAIN DESCRIPTION - ORIENTATION
ORIENTATION: LOWER
ORIENTATION: RIGHT;LEFT
ORIENTATION: LOWER

## 2023-04-11 ASSESSMENT — PAIN DESCRIPTION - DESCRIPTORS
DESCRIPTORS: ACHING;BURNING
DESCRIPTORS: BURNING;ACHING;DISCOMFORT

## 2023-04-11 ASSESSMENT — PAIN DESCRIPTION - LOCATION
LOCATION: ABDOMEN

## 2023-04-11 ASSESSMENT — PAIN - FUNCTIONAL ASSESSMENT: PAIN_FUNCTIONAL_ASSESSMENT: ACTIVITIES ARE NOT PREVENTED

## 2023-04-11 NOTE — PLAN OF CARE
Problem: Chronic Conditions and Co-morbidities  Goal: Patient's chronic conditions and co-morbidity symptoms are monitored and maintained or improved  Outcome: Progressing     Problem: Pain  Goal: Verbalizes/displays adequate comfort level or baseline comfort level  Outcome: Progressing     Problem: Safety - Adult  Goal: Free from fall injury  Outcome: Progressing  Flowsheets (Taken 4/10/2023 1118 by Melissa Ramachandran RN)  Free From Fall Injury: Instruct family/caregiver on patient safety     Problem: ABCDS Injury Assessment  Goal: Absence of physical injury  Outcome: Progressing

## 2023-04-12 ENCOUNTER — APPOINTMENT (OUTPATIENT)
Dept: CT IMAGING | Age: 83
DRG: 392 | End: 2023-04-12
Payer: MEDICARE

## 2023-04-12 PROBLEM — R10.9 INTRACTABLE ABDOMINAL PAIN: Status: ACTIVE | Noted: 2023-04-12

## 2023-04-12 PROCEDURE — 97116 GAIT TRAINING THERAPY: CPT

## 2023-04-12 PROCEDURE — 6370000000 HC RX 637 (ALT 250 FOR IP)

## 2023-04-12 PROCEDURE — 97535 SELF CARE MNGMENT TRAINING: CPT

## 2023-04-12 PROCEDURE — 1200000000 HC SEMI PRIVATE

## 2023-04-12 PROCEDURE — G0378 HOSPITAL OBSERVATION PER HR: HCPCS

## 2023-04-12 PROCEDURE — 74176 CT ABD & PELVIS W/O CONTRAST: CPT

## 2023-04-12 PROCEDURE — 97110 THERAPEUTIC EXERCISES: CPT

## 2023-04-12 PROCEDURE — 2500000003 HC RX 250 WO HCPCS: Performed by: EMERGENCY MEDICINE

## 2023-04-12 PROCEDURE — 6370000000 HC RX 637 (ALT 250 FOR IP): Performed by: STUDENT IN AN ORGANIZED HEALTH CARE EDUCATION/TRAINING PROGRAM

## 2023-04-12 PROCEDURE — 6370000000 HC RX 637 (ALT 250 FOR IP): Performed by: NURSE PRACTITIONER

## 2023-04-12 RX ORDER — POLYETHYLENE GLYCOL 3350 17 G/17G
17 POWDER, FOR SOLUTION ORAL DAILY
Status: DISCONTINUED | OUTPATIENT
Start: 2023-04-12 | End: 2023-04-14 | Stop reason: HOSPADM

## 2023-04-12 RX ORDER — DOCUSATE SODIUM 100 MG/1
100 CAPSULE, LIQUID FILLED ORAL 2 TIMES DAILY
Status: DISCONTINUED | OUTPATIENT
Start: 2023-04-12 | End: 2023-04-14 | Stop reason: HOSPADM

## 2023-04-12 RX ADMIN — SUCRALFATE 1 G: 1 TABLET ORAL at 21:53

## 2023-04-12 RX ADMIN — POLYETHYLENE GLYCOL 3350 17 G: 17 POWDER, FOR SOLUTION ORAL at 15:08

## 2023-04-12 RX ADMIN — TAMSULOSIN HYDROCHLORIDE 0.4 MG: 0.4 CAPSULE ORAL at 08:46

## 2023-04-12 RX ADMIN — BARIUM SULFATE 900 ML: 20 SUSPENSION ORAL at 12:48

## 2023-04-12 RX ADMIN — SUCRALFATE 1 G: 1 TABLET ORAL at 13:16

## 2023-04-12 RX ADMIN — SUCRALFATE 1 G: 1 TABLET ORAL at 18:34

## 2023-04-12 RX ADMIN — CLOPIDOGREL BISULFATE 75 MG: 75 TABLET, FILM COATED ORAL at 08:46

## 2023-04-12 RX ADMIN — ONDANSETRON 4 MG: 4 TABLET, ORALLY DISINTEGRATING ORAL at 08:47

## 2023-04-12 RX ADMIN — PANTOPRAZOLE SODIUM 40 MG: 40 TABLET, DELAYED RELEASE ORAL at 08:46

## 2023-04-12 RX ADMIN — ENTACAPONE 200 MG: 200 TABLET, FILM COATED ORAL at 13:15

## 2023-04-12 RX ADMIN — ROPINIROLE HYDROCHLORIDE 0.5 MG: 0.25 TABLET, FILM COATED ORAL at 21:52

## 2023-04-12 RX ADMIN — ENTACAPONE 200 MG: 200 TABLET, FILM COATED ORAL at 21:53

## 2023-04-12 RX ADMIN — DESMOPRESSIN ACETATE 40 MG: 0.2 TABLET ORAL at 08:46

## 2023-04-12 RX ADMIN — SUCRALFATE 1 G: 1 TABLET ORAL at 08:45

## 2023-04-12 RX ADMIN — ONDANSETRON 4 MG: 4 TABLET, ORALLY DISINTEGRATING ORAL at 00:42

## 2023-04-12 RX ADMIN — ENTACAPONE 200 MG: 200 TABLET, FILM COATED ORAL at 18:16

## 2023-04-12 RX ADMIN — MAGNESIUM GLUCONATE 500 MG ORAL TABLET 400 MG: 500 TABLET ORAL at 08:46

## 2023-04-12 RX ADMIN — CARBIDOPA AND LEVODOPA 1 TABLET: 25; 100 TABLET ORAL at 08:46

## 2023-04-12 RX ADMIN — ONDANSETRON 4 MG: 4 TABLET, ORALLY DISINTEGRATING ORAL at 15:08

## 2023-04-12 RX ADMIN — CARBIDOPA AND LEVODOPA 1 TABLET: 25; 100 TABLET ORAL at 13:15

## 2023-04-12 RX ADMIN — DOCUSATE SODIUM 100 MG: 100 CAPSULE ORAL at 21:52

## 2023-04-12 RX ADMIN — AMITRIPTYLINE HYDROCHLORIDE 10 MG: 10 TABLET, FILM COATED ORAL at 21:51

## 2023-04-12 RX ADMIN — CARBIDOPA AND LEVODOPA 1 TABLET: 25; 100 TABLET ORAL at 21:52

## 2023-04-12 RX ADMIN — ALLOPURINOL 100 MG: 100 TABLET ORAL at 08:46

## 2023-04-12 RX ADMIN — ROPINIROLE HYDROCHLORIDE 0.5 MG: 0.25 TABLET, FILM COATED ORAL at 13:15

## 2023-04-12 RX ADMIN — CARBIDOPA AND LEVODOPA 1 TABLET: 25; 100 TABLET ORAL at 18:15

## 2023-04-12 RX ADMIN — ROPINIROLE HYDROCHLORIDE 0.5 MG: 0.25 TABLET, FILM COATED ORAL at 08:47

## 2023-04-12 RX ADMIN — ENTACAPONE 200 MG: 200 TABLET, FILM COATED ORAL at 08:47

## 2023-04-12 RX ADMIN — ISOSORBIDE MONONITRATE 30 MG: 30 TABLET, EXTENDED RELEASE ORAL at 08:46

## 2023-04-12 ASSESSMENT — PAIN SCALES - GENERAL
PAINLEVEL_OUTOF10: 0
PAINLEVEL_OUTOF10: 4

## 2023-04-12 NOTE — PLAN OF CARE
Problem: Chronic Conditions and Co-morbidities  Goal: Patient's chronic conditions and co-morbidity symptoms are monitored and maintained or improved  Outcome: Progressing  Flowsheets (Taken 4/11/2023 1100 by Flora Haas RN)  Care Plan - Patient's Chronic Conditions and Co-Morbidity Symptoms are Monitored and Maintained or Improved:   Monitor and assess patient's chronic conditions and comorbid symptoms for stability, deterioration, or improvement   Collaborate with multidisciplinary team to address chronic and comorbid conditions and prevent exacerbation or deterioration   Update acute care plan with appropriate goals if chronic or comorbid symptoms are exacerbated and prevent overall improvement and discharge     Problem: Pain  Goal: Verbalizes/displays adequate comfort level or baseline comfort level  Outcome: Progressing     Problem: Safety - Adult  Goal: Free from fall injury  Outcome: Progressing     Problem: ABCDS Injury Assessment  Goal: Absence of physical injury  Outcome: Progressing Breath sounds clear and equal bilaterally.

## 2023-04-12 NOTE — CARE COORDINATION
17 JOVON Lopez called and spoke to Jenny from [address] [place] [address] to check on precert. Jenny will notify writer when available. [address] [place] [address] called to inquire about patient's precert. No new information is available  0110 Writer left message inquiring about this patient's precert.   Await response

## 2023-04-13 PROCEDURE — 6370000000 HC RX 637 (ALT 250 FOR IP): Performed by: NURSE PRACTITIONER

## 2023-04-13 PROCEDURE — 6370000000 HC RX 637 (ALT 250 FOR IP)

## 2023-04-13 PROCEDURE — 6360000002 HC RX W HCPCS: Performed by: STUDENT IN AN ORGANIZED HEALTH CARE EDUCATION/TRAINING PROGRAM

## 2023-04-13 PROCEDURE — 6370000000 HC RX 637 (ALT 250 FOR IP): Performed by: STUDENT IN AN ORGANIZED HEALTH CARE EDUCATION/TRAINING PROGRAM

## 2023-04-13 PROCEDURE — 1200000000 HC SEMI PRIVATE

## 2023-04-13 RX ORDER — CHOLECALCIFEROL (VITAMIN D3) 125 MCG
5 CAPSULE ORAL NIGHTLY PRN
Status: DISCONTINUED | OUTPATIENT
Start: 2023-04-13 | End: 2023-04-14 | Stop reason: HOSPADM

## 2023-04-13 RX ADMIN — DESMOPRESSIN ACETATE 40 MG: 0.2 TABLET ORAL at 08:48

## 2023-04-13 RX ADMIN — CARBIDOPA AND LEVODOPA 1 TABLET: 25; 100 TABLET ORAL at 20:11

## 2023-04-13 RX ADMIN — CARBIDOPA AND LEVODOPA 1 TABLET: 25; 100 TABLET ORAL at 08:48

## 2023-04-13 RX ADMIN — PANTOPRAZOLE SODIUM 40 MG: 40 TABLET, DELAYED RELEASE ORAL at 08:49

## 2023-04-13 RX ADMIN — ENTACAPONE 200 MG: 200 TABLET, FILM COATED ORAL at 18:18

## 2023-04-13 RX ADMIN — SUCRALFATE 1 G: 1 TABLET ORAL at 18:18

## 2023-04-13 RX ADMIN — TAMSULOSIN HYDROCHLORIDE 0.4 MG: 0.4 CAPSULE ORAL at 08:48

## 2023-04-13 RX ADMIN — ALLOPURINOL 100 MG: 100 TABLET ORAL at 08:48

## 2023-04-13 RX ADMIN — MAGNESIUM GLUCONATE 500 MG ORAL TABLET 400 MG: 500 TABLET ORAL at 08:48

## 2023-04-13 RX ADMIN — ACETAMINOPHEN 650 MG: 325 TABLET ORAL at 05:57

## 2023-04-13 RX ADMIN — ONDANSETRON 4 MG: 4 TABLET, ORALLY DISINTEGRATING ORAL at 08:46

## 2023-04-13 RX ADMIN — SUCRALFATE 1 G: 1 TABLET ORAL at 08:41

## 2023-04-13 RX ADMIN — ENTACAPONE 200 MG: 200 TABLET, FILM COATED ORAL at 20:11

## 2023-04-13 RX ADMIN — ENOXAPARIN SODIUM 40 MG: 100 INJECTION SUBCUTANEOUS at 08:47

## 2023-04-13 RX ADMIN — DOCUSATE SODIUM 100 MG: 100 CAPSULE ORAL at 08:48

## 2023-04-13 RX ADMIN — SUCRALFATE 1 G: 1 TABLET ORAL at 20:12

## 2023-04-13 RX ADMIN — ROPINIROLE HYDROCHLORIDE 0.5 MG: 0.25 TABLET, FILM COATED ORAL at 20:10

## 2023-04-13 RX ADMIN — POLYETHYLENE GLYCOL 3350 17 G: 17 POWDER, FOR SOLUTION ORAL at 08:43

## 2023-04-13 RX ADMIN — ENTACAPONE 200 MG: 200 TABLET, FILM COATED ORAL at 08:49

## 2023-04-13 RX ADMIN — CLOPIDOGREL BISULFATE 75 MG: 75 TABLET, FILM COATED ORAL at 08:48

## 2023-04-13 RX ADMIN — ISOSORBIDE MONONITRATE 30 MG: 30 TABLET, EXTENDED RELEASE ORAL at 08:48

## 2023-04-13 RX ADMIN — DOCUSATE SODIUM 100 MG: 100 CAPSULE ORAL at 20:11

## 2023-04-13 RX ADMIN — Medication 5 MG: at 22:58

## 2023-04-13 RX ADMIN — SUCRALFATE 1 G: 1 TABLET ORAL at 14:52

## 2023-04-13 RX ADMIN — ACETAMINOPHEN 650 MG: 325 TABLET ORAL at 22:58

## 2023-04-13 RX ADMIN — DICLOFENAC 2 G: 10 GEL TOPICAL at 05:55

## 2023-04-13 RX ADMIN — AMITRIPTYLINE HYDROCHLORIDE 10 MG: 10 TABLET, FILM COATED ORAL at 20:11

## 2023-04-13 RX ADMIN — ROPINIROLE HYDROCHLORIDE 0.5 MG: 0.25 TABLET, FILM COATED ORAL at 08:48

## 2023-04-13 RX ADMIN — ROPINIROLE HYDROCHLORIDE 0.5 MG: 0.25 TABLET, FILM COATED ORAL at 14:51

## 2023-04-13 RX ADMIN — ONDANSETRON 4 MG: 4 TABLET, ORALLY DISINTEGRATING ORAL at 18:18

## 2023-04-13 RX ADMIN — CARBIDOPA AND LEVODOPA 1 TABLET: 25; 100 TABLET ORAL at 14:51

## 2023-04-13 RX ADMIN — ENTACAPONE 200 MG: 200 TABLET, FILM COATED ORAL at 14:51

## 2023-04-13 RX ADMIN — CARBIDOPA AND LEVODOPA 1 TABLET: 25; 100 TABLET ORAL at 18:18

## 2023-04-13 ASSESSMENT — PAIN DESCRIPTION - LOCATION
LOCATION: KNEE
LOCATION: KNEE;BUTTOCKS

## 2023-04-13 ASSESSMENT — PAIN SCALES - GENERAL
PAINLEVEL_OUTOF10: 5
PAINLEVEL_OUTOF10: 6

## 2023-04-13 ASSESSMENT — PAIN DESCRIPTION - DESCRIPTORS
DESCRIPTORS: ACHING;SORE
DESCRIPTORS: ACHING

## 2023-04-13 ASSESSMENT — PAIN DESCRIPTION - ORIENTATION: ORIENTATION: LEFT

## 2023-04-13 ASSESSMENT — PAIN - FUNCTIONAL ASSESSMENT: PAIN_FUNCTIONAL_ASSESSMENT: ACTIVITIES ARE NOT PREVENTED

## 2023-04-13 NOTE — CARE COORDINATION
[facility]ie [facility] Quality Flow/Interdisciplinary Rounds Progress Note    Quality Flow Rounds held on April 13, 2023 at [address] Attending:  Bedside Nurse, , and Nursing Unit Leadership    Barriers to Discharge: [address]    Anticipated Discharge Date:   TBD    Anticipated Discharge Disposition: SNF    Readmission Risk              Risk of Unplanned Readmission:  35           Discussed patient goal for the day, patient clinical progression, and barriers to discharge.   The following Goal(s) of the Day/Commitment(s) have been identified:   called william with [facility] to f/u on precert  left vm      Crystal Monzon, RN  April 13, 2023

## 2023-04-13 NOTE — PLAN OF CARE
Problem: Chronic Conditions and Co-morbidities  Goal: Patient's chronic conditions and co-morbidity symptoms are monitored and maintained or improved  4/13/2023 1126 by Micaela Krause RN  Outcome: Progressing  4/13/2023 0114 by Marilynn Live RN  Outcome: Progressing     Problem: Pain  Goal: Verbalizes/displays adequate comfort level or baseline comfort level  4/13/2023 1126 by Micaela Krause RN  Outcome: Progressing  4/13/2023 0114 by Marilynn Live RN  Outcome: Progressing     Problem: Safety - Adult  Goal: Free from fall injury  4/13/2023 1126 by Micaela Krause RN  Outcome: Progressing  4/13/2023 0114 by Marilynn Live RN  Outcome: Progressing     Problem: ABCDS Injury Assessment  Goal: Absence of physical injury  4/13/2023 1126 by Micaela Krause RN  Outcome: Progressing  4/13/2023 0114 by Marilynn Live RN  Outcome: Progressing

## 2023-04-13 NOTE — ACP (ADVANCE CARE PLANNING)
Advance Care Planning     Advance Care Planning Activator (Inpatient)  Conversation Note      Date of ACP Conversation: 4/13/2023     Conversation Conducted with: Patient with Decision Making Capacity    ACP Activator: Chayito Anderson RN        Health Care Decision Maker:     Current Designated Health Care Decision Maker:     Primary Decision MakeChristopher Guevara - [phone]    Secondary Decision Maker: Maria Luisa Cheek - [phone]  Click here to complete Healthcare Decision Makers including section of the Healthcare Decision Maker Relationship (ie \"Primary\")  Today we documented Decision Maker(s) consistent with Legal Next of Kin hierarchy. Care Preferences    Ventilation: \"If you were in your present state of health and suddenly became very ill and were unable to breathe on your own, what would your preference be about the use of a ventilator (breathing machine) if it were available to you? \"      Would the patient desire the use of ventilator (breathing machine)?: yes    \"If your health worsens and it becomes clear that your chance of recovery is unlikely, what would your preference be about the use of a ventilator (breathing machine) if it were available to you? \"     Would the patient desire the use of ventilator (breathing machine)?: Yes      Resuscitation  \"CPR works best to restart the heart when there is a sudden event, like a heart attack, in someone who is otherwise healthy. Unfortunately, CPR does not typically restart the heart for people who have serious health conditions or who are very sick. \"    \"In the event your heart stopped as a result of an underlying serious health condition, would you want attempts to be made to restart your heart (answer \"yes\" for attempt to resuscitate) or would you prefer a natural death (answer \"no\" for do not attempt to resuscitate)? \" yes       [] Yes   [] No   Educated Patient / Decision Maker regarding differences between Advance Directives and

## 2023-04-14 VITALS
RESPIRATION RATE: 16 BRPM | BODY MASS INDEX: 27.58 KG/M2 | HEART RATE: 55 BPM | OXYGEN SATURATION: 98 % | WEIGHT: 175.71 LBS | DIASTOLIC BLOOD PRESSURE: 72 MMHG | HEIGHT: 67 IN | TEMPERATURE: 96.8 F | SYSTOLIC BLOOD PRESSURE: 129 MMHG

## 2023-04-14 PROCEDURE — 6360000002 HC RX W HCPCS: Performed by: STUDENT IN AN ORGANIZED HEALTH CARE EDUCATION/TRAINING PROGRAM

## 2023-04-14 PROCEDURE — 6370000000 HC RX 637 (ALT 250 FOR IP): Performed by: STUDENT IN AN ORGANIZED HEALTH CARE EDUCATION/TRAINING PROGRAM

## 2023-04-14 PROCEDURE — 6370000000 HC RX 637 (ALT 250 FOR IP)

## 2023-04-14 PROCEDURE — 6370000000 HC RX 637 (ALT 250 FOR IP): Performed by: NURSE PRACTITIONER

## 2023-04-14 RX ADMIN — ENTACAPONE 200 MG: 200 TABLET, FILM COATED ORAL at 08:48

## 2023-04-14 RX ADMIN — ISOSORBIDE MONONITRATE 30 MG: 30 TABLET, EXTENDED RELEASE ORAL at 08:47

## 2023-04-14 RX ADMIN — POLYETHYLENE GLYCOL 3350 17 G: 17 POWDER, FOR SOLUTION ORAL at 08:48

## 2023-04-14 RX ADMIN — TAMSULOSIN HYDROCHLORIDE 0.4 MG: 0.4 CAPSULE ORAL at 08:48

## 2023-04-14 RX ADMIN — MAGNESIUM GLUCONATE 500 MG ORAL TABLET 400 MG: 500 TABLET ORAL at 08:47

## 2023-04-14 RX ADMIN — ROPINIROLE HYDROCHLORIDE 0.5 MG: 0.25 TABLET, FILM COATED ORAL at 08:47

## 2023-04-14 RX ADMIN — CLOPIDOGREL BISULFATE 75 MG: 75 TABLET, FILM COATED ORAL at 08:47

## 2023-04-14 RX ADMIN — DESMOPRESSIN ACETATE 40 MG: 0.2 TABLET ORAL at 08:48

## 2023-04-14 RX ADMIN — CARBIDOPA AND LEVODOPA 1 TABLET: 25; 100 TABLET ORAL at 08:48

## 2023-04-14 RX ADMIN — DOCUSATE SODIUM 100 MG: 100 CAPSULE ORAL at 08:47

## 2023-04-14 RX ADMIN — ALLOPURINOL 100 MG: 100 TABLET ORAL at 08:48

## 2023-04-14 RX ADMIN — PANTOPRAZOLE SODIUM 40 MG: 40 TABLET, DELAYED RELEASE ORAL at 08:47

## 2023-04-14 RX ADMIN — SUCRALFATE 1 G: 1 TABLET ORAL at 08:48

## 2023-04-14 RX ADMIN — ONDANSETRON 4 MG: 4 TABLET, ORALLY DISINTEGRATING ORAL at 08:46

## 2023-04-14 RX ADMIN — ENOXAPARIN SODIUM 40 MG: 100 INJECTION SUBCUTANEOUS at 08:48

## 2023-04-14 ASSESSMENT — PAIN DESCRIPTION - DESCRIPTORS: DESCRIPTORS: ACHING

## 2023-04-14 ASSESSMENT — PAIN SCALES - GENERAL
PAINLEVEL_OUTOF10: 3
PAINLEVEL_OUTOF10: 3

## 2023-04-14 ASSESSMENT — PAIN DESCRIPTION - ONSET: ONSET: ON-GOING

## 2023-04-14 ASSESSMENT — PAIN DESCRIPTION - FREQUENCY: FREQUENCY: CONTINUOUS

## 2023-04-14 ASSESSMENT — PAIN DESCRIPTION - LOCATION: LOCATION: ABDOMEN

## 2023-04-14 ASSESSMENT — PAIN - FUNCTIONAL ASSESSMENT: PAIN_FUNCTIONAL_ASSESSMENT: ACTIVITIES ARE NOT PREVENTED

## 2023-04-14 ASSESSMENT — PAIN DESCRIPTION - PAIN TYPE: TYPE: CHRONIC PAIN

## 2023-04-14 ASSESSMENT — PAIN DESCRIPTION - ORIENTATION: ORIENTATION: UPPER

## 2023-04-14 NOTE — DISCHARGE SUMMARY
CDU Discharge Summary        Patient:  Patricia Acuna  YOB: 1940    MRN: [id number]   Acct: [de-identified]    Primary Care Physician: JW Vizcarra CNP    Admit date:  4/9/2023  9:01 AM  Discharge date: No discharge date for patient encounter. Discharge Diagnoses:     1.)  Outpatient treatment failure. Failure to thrive. Patient with confusion with medication secondary to early dementia and Parkinson's. 2.  Safe disposition to ECF. Patient with improvement when on regular medication dosing and observation unit    3.  Epigastric discomfort. Evaluated with CT scanning. Patient currently handling p.o. well. Continue current regimen    Follow-up:  Call today/tomorrow for a follow up appointment with JW Vizcarra CNP , or return to the Emergency Room with worsening symptoms    Stressed to patient the importance of following up with primary care doctor for further workup/management of symptoms. Pt verbalizes understanding and agrees with plan. Discharge Medication Changes:       Medication List        START taking these medications      ondansetron 4 MG disintegrating tablet  Commonly known as: ZOFRAN-ODT  Take 1 tablet by mouth 3 times daily as needed for Nausea or Vomiting            CONTINUE taking these medications      allopurinol 100 MG tablet  Commonly known as: ZYLOPRIM  TAKE 1 TABLET BY MOUTH ONCE DAILY     amitriptyline 10 MG tablet  Commonly known as: ELAVIL  Take 1 tablet by mouth nightly     amLODIPine 5 MG tablet  Commonly known as: NORVASC  Take 1 tablet by mouth daily     atorvastatin 40 MG tablet  Commonly known as: LIPITOR  take 1 tablet by mouth once daily     butalbital-acetaminophen-caffeine -40 MG per tablet  Commonly known as: FIORICET, ESGIC  Take 1 tab prn only for severe headache. Can repeat after 4 hrs if needed. Max 2 tabs/24 hrs.  Max 4 tabs/week     carbidopa-levodopa  MG per tablet  Commonly known as: SINEMET  TAKE ONE (1) TABLET BY MOUTH

## 2023-04-14 NOTE — H&P
9:50 Spoke with Ani from [place], she called on 4/13 with prior auth. Pt can arrive anytime. [facility] . YANELI CAZARES printed and sent in blue envelope.  Phone number for report [phone] given to [employer]
[facility]), CKD (chronic kidney disease) stage 2, GFR 60-89 ml/min, CKD (chronic kidney disease) stage 3, GFR 30-59 ml/min ([facility]), Diverticulosis of colon, GERD (gastroesophageal reflux disease), History of non-ST elevation myocardial infarction (NSTEMI) 6/2022, Impaired renal function, MRSA (methicillin resistant staph aureus) culture positive, Osteoarthritis of knee, Parkinson disease ([facility] [address] Proteinuria, Skull fracture ([facility] [address] Temporary loss of eyesight, and Tubular adenoma of colon. I have reviewed the past medical history with the patient and it is pertinent to this complaint. SURGICAL HISTORY      has a past surgical history that includes Colonoscopy (11/02/2012); shoulder surgery (Right); pr colsc flx w/rmvl of tumor polyp lesion snare tq (N/A, 09/19/2017); Colonoscopy (09/19/2017); Cholecystectomy, laparoscopic (N/A, 10/29/2022); Esophagogastroduodenoscopy (01/09/2023); and Upper gastrointestinal endoscopy (N/A, 1/9/2023). I have reviewed and agree with Surgical History entered and it is pertinent to this complaint.      CURRENT MEDICATIONS     sodium chloride flush 0.9 % injection 5-40 mL, 2 times per day  sodium chloride flush 0.9 % injection 5-40 mL, PRN  0.9 % sodium chloride infusion, PRN  enoxaparin (LOVENOX) injection 40 mg, Daily  ondansetron (ZOFRAN-ODT) disintegrating tablet 4 mg, Q8H PRN   Or  ondansetron (ZOFRAN) injection 4 mg, Q6H PRN  polyethylene glycol (GLYCOLAX) packet 17 g, Daily PRN  acetaminophen (TYLENOL) tablet 650 mg, Q6H PRN   Or  acetaminophen (TYLENOL) suppository 650 mg, Q6H PRN  allopurinol (ZYLOPRIM) tablet 100 mg, Daily  amitriptyline (ELAVIL) tablet 10 mg, Nightly  atorvastatin (LIPITOR) tablet 40 mg, Daily  carbidopa-levodopa (SINEMET)  MG per tablet 1 tablet, 4x Daily  clopidogrel (PLAVIX) tablet 75 mg, Daily  diclofenac sodium (VOLTAREN) 1 % gel 2 g, BID PRN  entacapone (COMTAN) tablet 200 mg, 4x Daily  isosorbide mononitrate (IMDUR) extended release tablet 30 mg,

## 2023-04-17 NOTE — PROGRESS NOTES
[address]  CDU / OBSERVATION ENCOUNTER  ATTENDING NOTE       I performed a history and physical examination of the patient and discussed management with the resident or midlevel provider. I reviewed the resident or midlevel provider's note and agree with the documented findings and plan of care. Any areas of disagreement are noted on the chart. I was personally present for the key portions of any procedures. I have documented in the chart those procedures where I was not present during the key portions. I have reviewed the nurses notes. I agree with the chief complaint, past medical history, past surgical history, allergies, medications, social and family history as documented unless otherwise noted below. The Family history, social history, and ROS are effectively unchanged since admission unless noted elsewhere in the chart. This patient was placed in the observation unit for reevaluation for possible admission to the hospital    Patient was admitted to the ETU for difficulty completing activities of daily living. Patient has a history of Parkinson's and has had problems managing his home medications. He does not currently have any home health care in place. Patient is able to answer basic questions without difficulty but does seem to have some confusion when conversation becomes more complex. Patient complains of epigastric abdominal pain which has been chronic for him and associated with esophagitis. Patient has not been taking those medications. I do not feel that further work-up is indicated at this time for that complaint. We will appreciate PT/OT recommendations and work on placement to a SNF.     Mónica Obregon MD  Attending Emergency  Physician
[address]  CDU / OBSERVATION ENCOUNTER  ATTENDING NOTE       I performed a history and physical examination of the patient and discussed management with the resident or midlevel provider. I reviewed the resident or midlevel provider's note and agree with the documented findings and plan of care. Any areas of disagreement are noted on the chart. I was personally present for the key portions of any procedures. I have documented in the chart those procedures where I was not present during the key portions. I have reviewed the nurses notes. I agree with the chief complaint, past medical history, past surgical history, allergies, medications, social and family history as documented unless otherwise noted below. The Family history, social history, and ROS are effectively unchanged since admission unless noted elsewhere in the chart. This patient was placed in the observation unit for reevaluation for possible admission to the hospital    Awaiting precertification. No new issues. Following case management notes. Appreciate efforts on case management's part to get patient placement.     Maine Rothman MD  Attending Emergency  Physician
[address]  CDU / OBSERVATION ENCOUNTER  ATTENDING NOTE       I performed a history and physical examination of the patient and discussed management with the resident or midlevel provider. I reviewed the resident or midlevel provider's note and agree with the documented findings and plan of care. Any areas of disagreement are noted on the chart. I was personally present for the key portions of any procedures. I have documented in the chart those procedures where I was not present during the key portions. I have reviewed the nurses notes. I agree with the chief complaint, past medical history, past surgical history, allergies, medications, social and family history as documented unless otherwise noted below. The Family history, social history, and ROS are effectively unchanged since admission unless noted elsewhere in the chart. This patient was placed in the observation unit for reevaluation for possible admission to the hospital    Patient has had difficulty completing activities of daily living. Patient with history of Parkinson's and is having trouble in managing home medications. Patient is not meeting his needs as an outpatient. Patient has some dementia. Patient with also complaints of epigastric abdominal pain which has been chronic and associated esophagitis. Patient has had this for some time and has been evaluated by GI multiple times. PT OT to evaluate patient. Patient is requiring further ongoing care that is not available at home and has been accepted at [place]. Precertification beginning.     Kwasi Casiano MD  Attending Emergency  Physician
OBS/CDU   Attending NOTE      Patients PCP is:  Emmett Stovall, JW - CNP        SUBJECTIVE      Patient with epigastric discomfort and dry heaving on my initial evaluation this morning. Due to patient's age and underlying chronic condition patient had further evaluation with CT scanning. CT scanning did not show acute change. On return from CT per patient report patient was doing better. Patient considered to be at baseline. Continue search for safe discharge. PHYSICAL EXAM      General: NAD, AO X 3  Heent: EMOI, PERRL  Neck: SUPPLE, NO JVD  Cardiovascular: RRR, S1S2  Pulmonary: CTAB, NO SOB  Abdomen: SOFT, NTTP, ND, +BS  Extremities: +2/4 PULSES DISTAL, NO SWELLING  Neuro / Psych: NO NUMBNESS OR TINGLING, MENTATION AT BASELINE    PERTINENT TEST /EXAMS      I have reviewed all available laboratory results.     MEDICATIONS CURRENT   polyethylene glycol (GLYCOLAX) packet 17 g, Daily  docusate sodium (COLACE) capsule 100 mg, BID  ondansetron (ZOFRAN-ODT) disintegrating tablet 4 mg, q8h   Or  ondansetron (ZOFRAN) injection 4 mg, q8h  sodium chloride flush 0.9 % injection 5-40 mL, 2 times per day  sodium chloride flush 0.9 % injection 5-40 mL, PRN  0.9 % sodium chloride infusion, PRN  enoxaparin (LOVENOX) injection 40 mg, Daily  acetaminophen (TYLENOL) tablet 650 mg, Q6H PRN   Or  acetaminophen (TYLENOL) suppository 650 mg, Q6H PRN  allopurinol (ZYLOPRIM) tablet 100 mg, Daily  amitriptyline (ELAVIL) tablet 10 mg, Nightly  atorvastatin (LIPITOR) tablet 40 mg, Daily  carbidopa-levodopa (SINEMET)  MG per tablet 1 tablet, 4x Daily  clopidogrel (PLAVIX) tablet 75 mg, Daily  diclofenac sodium (VOLTAREN) 1 % gel 2 g, BID PRN  entacapone (COMTAN) tablet 200 mg, 4x Daily  isosorbide mononitrate (IMDUR) extended release tablet 30 mg, Daily  magnesium oxide (MAG-OX) tablet 400 mg, Daily  pantoprazole (PROTONIX) tablet 40 mg, QAM AC  tamsulosin (FLOMAX) capsule 0.4 mg, Daily  rOPINIRole (REQUIP) tablet 0.5 mg,
OBS/CDU   Attending NOTE      Patients PCP is:  Merry Zuñiga, JW - FELIPE        SUBJECTIVE      Patient doing well. Per nursing no difficulties. Patient for ECF placement today. Transportation being arranged. Patient handling p.o. Patient has no new medical complaint. Patient for [facility]. Precertification done. PHYSICAL EXAM      General: NAD, AO X 3  Heent: EMOI, PERRL  Neck: SUPPLE, NO JVD  Cardiovascular: RRR, S1S2  Pulmonary: CTAB, NO SOB  Abdomen: SOFT, NTTP, ND, +BS  Extremities: +2/4 PULSES DISTAL, NO SWELLING  Neuro / Psych: NO NUMBNESS OR TINGLING, MENTATION AT BASELINE    PERTINENT TEST /EXAMS      I have reviewed all available laboratory results. MEDICATIONS CURRENT   melatonin tablet 5 mg, Nightly PRN  polyethylene glycol (GLYCOLAX) packet 17 g, Daily  docusate sodium (COLACE) capsule 100 mg, BID  ondansetron (ZOFRAN-ODT) disintegrating tablet 4 mg, q8h   Or  ondansetron (ZOFRAN) injection 4 mg, q8h  sodium chloride flush 0.9 % injection 5-40 mL, 2 times per day  sodium chloride flush 0.9 % injection 5-40 mL, PRN  0.9 % sodium chloride infusion, PRN  enoxaparin (LOVENOX) injection 40 mg, Daily  acetaminophen (TYLENOL) tablet 650 mg, Q6H PRN   Or  acetaminophen (TYLENOL) suppository 650 mg, Q6H PRN  allopurinol (ZYLOPRIM) tablet 100 mg, Daily  amitriptyline (ELAVIL) tablet 10 mg, Nightly  atorvastatin (LIPITOR) tablet 40 mg, Daily  carbidopa-levodopa (SINEMET)  MG per tablet 1 tablet, 4x Daily  clopidogrel (PLAVIX) tablet 75 mg, Daily  diclofenac sodium (VOLTAREN) 1 % gel 2 g, BID PRN  entacapone (COMTAN) tablet 200 mg, 4x Daily  isosorbide mononitrate (IMDUR) extended release tablet 30 mg, Daily  magnesium oxide (MAG-OX) tablet 400 mg, Daily  pantoprazole (PROTONIX) tablet 40 mg, QAM AC  tamsulosin (FLOMAX) capsule 0.4 mg, Daily  rOPINIRole (REQUIP) tablet 0.5 mg, TID  sucralfate (CARAFATE) tablet 1 g, 4x Daily        All medication charted and reviewed.     CONSULTS
OBS/CDU   RESIDENT NOTE      Patients PCP is:  Brenda Peguero, APRN - CNP        SUBJECTIVE      Patient states that he is nauseated this morning. Looking at the [facility] he has not had any antiemetics since yesterday afternoon. We will schedule Zofran to be given. Will reassess    No acute events overnight. Has been able to tolerate a full diet without nausea or vomiting. The patient is urinating on his own and is passing flatus. Denies fever, chills, nausea, vomiting, chest pain, shortness of breath, abdominal pain, focal weakness, numbness, tingling, urinary/bowel symptoms, vision changes, visual hallucinations, or headache. PHYSICAL EXAM      General: NAD, AO X 3  Heent: EMOI, PERRL  Neck: SUPPLE, NO JVD  Cardiovascular: RRR, S1S2  Pulmonary: CTAB, NO SOB  Abdomen: SOFT, NTTP, ND, +BS  Extremities: +2/4 PULSES DISTAL, NO SWELLING  Neuro / Psych: NO NUMBNESS OR TINGLING, MENTATION AT BASELINE    PERTINENT TEST /EXAMS      I have reviewed all available laboratory results.     MEDICATIONS CURRENT   ondansetron (ZOFRAN-ODT) disintegrating tablet 4 mg, q8h   Or  ondansetron (ZOFRAN) injection 4 mg, q8h  sodium chloride flush 0.9 % injection 5-40 mL, 2 times per day  sodium chloride flush 0.9 % injection 5-40 mL, PRN  0.9 % sodium chloride infusion, PRN  enoxaparin (LOVENOX) injection 40 mg, Daily  polyethylene glycol (GLYCOLAX) packet 17 g, Daily PRN  acetaminophen (TYLENOL) tablet 650 mg, Q6H PRN   Or  acetaminophen (TYLENOL) suppository 650 mg, Q6H PRN  allopurinol (ZYLOPRIM) tablet 100 mg, Daily  amitriptyline (ELAVIL) tablet 10 mg, Nightly  atorvastatin (LIPITOR) tablet 40 mg, Daily  carbidopa-levodopa (SINEMET)  MG per tablet 1 tablet, 4x Daily  clopidogrel (PLAVIX) tablet 75 mg, Daily  diclofenac sodium (VOLTAREN) 1 % gel 2 g, BID PRN  entacapone (COMTAN) tablet 200 mg, 4x Daily  isosorbide mononitrate (IMDUR) extended release tablet 30 mg, Daily  magnesium oxide (MAG-OX) tablet 400 mg,
OBS/CDU   RESIDENT NOTE      Patients PCP is:  Melissa Mcintosh, APRN - CNP        SUBJECTIVE      No acute events overnight. Has been able to tolerate a full diet without nausea or vomiting. The patient is urinating on his own and is passing flatus. Denies fever, chills, nausea, vomiting, chest pain, shortness of breath, abdominal pain, focal weakness, numbness, tingling, urinary/bowel symptoms, vision changes, visual hallucinations, or headache. PHYSICAL EXAM      General: NAD, AO X 3  Heent: EMOI, PERRL  Neck: SUPPLE, NO JVD  Cardiovascular: RRR, S1S2  Pulmonary: CTAB, NO SOB  Abdomen: SOFT, NTTP, ND, +BS  Extremities: +2/4 PULSES DISTAL, NO SWELLING  Neuro / Psych: NO NUMBNESS OR TINGLING, MENTATION AT BASELINE    PERTINENT TEST /EXAMS      I have reviewed all available laboratory results. MEDICATIONS CURRENT   No current facility-administered medications for this encounter. All medication charted and reviewed. CONSULTS      None    ASSESSMENT/PLAN       Evangelina Client is a 79 y.o. male who presents with epigastric pain and inability to care for self at home. Patient has had multiple episodes of similar admissions secondary to inability to care for self at home. Patient is failing outpatient therapy but does not have approval for insurance for long-term care. Patient has episodically been back secondary to inability to have a safe living arrangement at home. Confusion with medications. Patient with Parkinson's. Patient with early dementia  Ongoing abdominal discomfort and dry heaving this morning. We will get CT scanning. CT scanning did not show acute issue. Patient now handling p.o. Patient is improved throughout the day.      Continue home medications and pain control  Monitor vitals, labs, and imaging  DISPO: pending consults and clinical improvement    --  Jany Rojas DO  Emergency Medicine Resident Physician     This dictation was generated by voice recognition computer
Occupational Therapy  Facility/Department: [address] OBSERVATION  Occupational Therapy Initial Assessment    Name: Jurgen Darby  : 1940  MRN: [id number]  Date of Service: 2023    Chief Complaint   Patient presents with    Chest Pain       Every day          HISTORY OF PRESENT ILLNESS  (Location/Symptom, Timing/Onset, Context/Setting, Quality, Duration, Modifying Factors, Severity.)       Jurgen Darby is a 79 y.o. male who presents with generalized abdominal pain, nausea, and a sensation of reflux going up his throat. Patient has history of cholecystectomy in 10/22, history of gallstone pancreatitis and esophagitis for which he is received multiple EGDs. Patient normally takes Carafate and Protonix, states he took nitroglycerin as well without any symptom improvement. Patient states he has had the same pain before, but this pain has not deferred from his usual pain. He states this is a daily issue for him. The pain is improved by any sort of movement including walking around. He states nothing makes the pain worse. Patient denies fevers, chills, shortness of breath, headache, changes in vision, vomiting, diarrhea, dysuria, hematuria, numbness, tingling, weakness. Discharge Recommendations:  Patient would benefit from continued therapy after discharge  OT Equipment Recommendations  Equipment Needed: No       Patient Diagnosis(es): The encounter diagnosis was Nausea.   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
Occupational Therapy  Facility/Department: [facility] 3C OBSERVATION  Occupational Therapy Daily Progress Note    Name: Emy Dick  : 1940  MRN: [id number]  Date of Service: 2023    Discharge Recommendations:  Patient would benefit from continued therapy after discharge  OT Equipment Recommendations  Equipment Needed: No       Patient Diagnosis(es): The encounter diagnosis was Nausea. 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 Past Surgical History:  has a past surgical history that includes Colonoscopy (2012); shoulder surgery (Right); pr colsc flx w/rmvl of tumor polyp lesion snare tq (N/A, 2017); Colonoscopy (2017); Cholecystectomy, laparoscopic (N/A, 10/29/2022); Esophagogastroduodenoscopy (2023); and Upper gastrointestinal endoscopy (N/A, 2023). Assessment   Performance deficits / Impairments: Decreased functional mobility ; Decreased ADL status; Decreased cognition;Decreased coordination;Decreased balance;Decreased endurance  Prognosis: Good  Decision Making: Medium Complexity  REQUIRES OT FOLLOW-UP: Yes  Activity Tolerance  Activity Tolerance: Patient limited by pain; Patient Tolerated treatment well        Plan   Occupational Therapy Plan  Times Per Week: 3x/week  Current Treatment Recommendations: Balance training, Functional mobility training, Endurance training, Pain management, Safety education & training, Equipment evaluation,
Physical Therapy  Facility/Department: [address] OBSERVATION  Physical Therapy Initial Assessment    Name: Berdie Kawasaki  : 1940  MRN: [id number]  Date of Service: 4/10/2023    Discharge Recommendations:  Patient would benefit from continued therapy after discharge          Patient Diagnosis(es): The encounter diagnosis was Nausea. 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 ([facility]), CKD (chronic kidney disease) stage 2, GFR 60-89 ml/min, CKD (chronic kidney disease) stage 3, GFR 30-59 ml/min ([facility]), Diverticulosis of colon, GERD (gastroesophageal reflux disease), History of non-ST elevation myocardial infarction (NSTEMI) 2022, Impaired renal function, MRSA (methicillin resistant staph aureus) culture positive, Osteoarthritis of knee, Parkinson disease ([facility]), Proteinuria, Skull fracture ([facility]), Temporary loss of eyesight, and Tubular adenoma of colon. Past Surgical History:  has a past surgical history that includes Colonoscopy (2012); shoulder surgery (Right); pr colsc flx w/rmvl of tumor polyp lesion snare tq (N/A, 2017); Colonoscopy (2017); Cholecystectomy, laparoscopic (N/A, 10/29/2022); Esophagogastroduodenoscopy (2023); and Upper gastrointestinal endoscopy (N/A, 2023). Assessment   Body Structures, Functions, Activity Limitations Requiring Skilled Therapeutic Intervention: Decreased functional mobility ; Decreased ROM; Decreased strength; Increased pain;Decreased balance;Decreased endurance;Decreased coordination;Decreased cognition;Decreased safe awareness  Assessment: Patient needs assistance to sit up, has limited gait with left knee pain, joint deformity, lacks full active ROM. Patient is mildly confused. Will need further PT to regain functional independence.   Therapy Prognosis: Good  Decision Making: Low Complexity  Clinical Presentation: stable  Requires PT Follow-Up: Yes  Activity Tolerance  Activity Tolerance:
RN called William Kay to give Transfer Report. Liset advised no one was available to take report and took my name and number for an RN to return my call .
[facility])  Occupational Therapy Not Seen Note    DATE: 4/10/2023    NAME: Garima Soto  MRN: [id number]   : 1940      Patient not seen this date for Occupational Therapy due to:    Patient Declined: Pt declines participation in therapy assessment this date d/t nausea. RN recently gave pt medication to manage symptoms. Pt agreeable to checking back tomorrow AM and educated on need for therapy notes as soon as able to tolerate evaluation. OT will check back tomorrow and assess as able.     Next Scheduled Treatment:       Electronically signed by BRODERICK Goff on 4/10/2023 at 3:09 PM
[facility] called and took report on patient all questions were answered.
Patient  Education Provided: Role of Therapy;Plan of Care;Precautions;Transfer Training  Education Method: Demonstration;Verbal  Barriers to Learning: Cognition  Education Outcome: Verbalized understanding;Continued education needed;Demonstrated understanding      Therapy Time   Individual Concurrent Group Co-treatment   Time In 1334         Time Out 1400         Minutes 26         Timed Code Treatment Minutes: [address] María Messina PTA

## 2023-05-04 NOTE — PROGRESS NOTES
[facility]  Office: [phone]  Roc Mahoney, DO, Sulaiman García DO, Kaleb Allen DO, Karoline White, DO, Tamy Rust MD, Luciano Roman MD, Antoinette Feliz MD, Morro Espinoza MD, Velma Ortiz MD, Eric Diez MD, Annmarie Argueta MD, Sunita Hagen DO, Iliana Woodall MD,  Mary Banks DO, Dietrich Halsted, MD, Argelia Aranda MD, Micheline Tony MD, Aaron Mcneill, DO, Brian Logan MD, Jimenez Harris MD, Isabela Holloway DO, Criselda Taylor MD, Stan Dean, [facility], [facility], [facility], Candido Herbert, CNP, Charlene Manuel, CNP, Abelardo Phillips, CNP, Ila Yin, CNP, Anitra Patton, PA-C, Damaso Contreras, [facility], Nikko Randall, CNP, Yulissa Barber, CNP, Javy Fragoso, CNP, Artur Powell, CNS, Campos Marcial, [facility], Zuly Gaspar, CNP, Lowell Watts, [facility], Vita Forbes, [address] [place] [address]    Progress Note    6/10/2022    11:04 AM    Name:   Burton Nino  MRN:     [id number]     Acct:      [de-identified]   Room:   2022/2022-01   Day:  3  Admit Date:  6/7/2022  9:22 AM    PCP:   JW Abdalla CNP  Code Status:  Full Code    Subjective:     C/C:   Chief Complaint   Patient presents with    Chest Pain     Interval History Status: not changed. Pt doing well today. He is complaining of some burning/aching pain in his shoulder, and chest wall that is reproducible with palpation. He did get relief with lidocaine patch previously     Otherwise, waiting on placement. Brief History: This is an 56-year-old male with a past medical history listed below who presents to the emergency department with a chief complaint of sharp chest pain 7-8/10 radiating to his back with shortness of breath and dizziness.  Not current and improved with nitro.  Also endorsing some left knee pain after recent steroid injection with his pain management provider at [facility] pain management clinic on 6/6/2022. Initial troponin was slightly disease) stage 2, GFR 60-89 ml/min, CKD (chronic kidney disease) stage 3, GFR 30-59 ml/min ([facility]), Diverticulosis of colon, GERD (gastroesophageal reflux disease), Impaired renal function, MRSA (methicillin resistant staph aureus) culture positive, Osteoarthritis of knee, Parkinson disease ([facility] [address] Proteinuria, Skull fracture ([facility] [address] Temporary loss of eyesight, and Tubular adenoma of colon. Social History:   reports that he has quit smoking. He has never used smokeless tobacco. He reports that he does not drink alcohol and does not use drugs. Family History:   Family History   Problem Relation Age of Onset    No Known Problems Mother     No Known Problems Father        Vitals:  /66   Pulse 57   Temp 98 °F (36.7 °C) (Oral)   Resp 19   Ht 5' 7\" (1.702 m)   Wt 170 lb 3.1 oz (77.2 kg)   SpO2 95%   BMI 26.66 kg/m²   Temp (24hrs), Av.8 °F (36.6 °C), Min:97.5 °F (36.4 °C), Max:98.2 °F (36.8 °C)    No results for input(s): POCGLU in the last 72 hours. I/O (24Hr):     Intake/Output Summary (Last 24 hours) at 6/10/2022 1104  Last data filed at 6/10/2022 0807  Gross per 24 hour   Intake 750 ml   Output 2150 ml   Net -1400 ml       Labs:  Hematology:  Recent Labs     22  025   WBC 11.0 7.4   RBC 4.37 4.05*   HGB 14.0 13.2   HCT 40.2* 37.5*   MCV 92.0 92.6   MCH 32.0 32.6   MCHC 34.8 35.2*   RDW 14.4 14.5*    191   MPV 9.4 9.2     Chemistry:  Recent Labs     22  1647 22  1840 22  0627 22  0251 06/10/22  1015   NA  --   --  141 134* 135   K  --   --  4.2 4.9 4.1   CL  --   --  107 103 103   CO2  --   --   22   GLUCOSE  --   --  115* 224* 198*   BUN  --   --  19 22 25*   CREATININE  --   --  1.28* 1.51* 1.37*   MG  --   --  2.2  --   --    ANIONGAP  --   --  13 10 10   LABGLOM  --   --  54* 44* 50*   GFRAA  --   --  >60 54* >60   CALCIUM  --   --  9.3 8.7 8.8   PHOS  --   --   --   --  3.5   TROPHS 51* 44*  --   --   --      Recent Labs 06/08/22  0627 06/09/22  0251 06/10/22  1015   PROT 6.7 6.0*  --    LABALBU 3.9 3.4* 3.4*   LABA1C 6.4*  --   --    AST 28 31  --    ALT 21 17  --    ALKPHOS 92 87  --    BILITOT 0.28* 0.30  --      ABG:No results found for: POCPH, PHART, PH, POCPCO2, GDX2EOS, PCO2, POCPO2, PO2ART, PO2, POCHCO3, YIZ8JLV, HCO3, NBEA, PBEA, BEART, BE, THGBART, THB, OXP8QAU, KYRL6JDR, D9UJLKCU, O2SAT, FIO2  Lab Results   Component Value Date/Time    SPECIAL RFA 16ML 06/07/2022 10:46 AM     Lab Results   Component Value Date/Time    CULTURE NO GROWTH 2 DAYS 06/07/2022 10:46 AM       Radiology:  XR CHEST PORTABLE    Result Date: 6/7/2022  No acute cardiopulmonary process. Physical Examination:        General appearance:  alert, cooperative and no distress  Mental Status:  oriented to person, place and time and normal affect  Lungs:  clear to auscultation bilaterally, normal effort  Heart:  regular rate and rhythm, no murmur  Abdomen:  soft, nontender, nondistended, normal bowel sounds, no masses, hepatomegaly, splenomegaly  Extremities:  no edema, redness, tenderness in the calves pain with palpation of chest wall and shoulder  Skin:  no gross lesions, rashes, induration    Assessment:        Hospital Problems           Last Modified POA    Chronic pain of multiple joints (Chronic) 6/9/2022 Yes    Gout 6/9/2022 Yes    CKD (chronic kidney disease) stage 3, GFR 30-59 ml/min ([facility] [address] 6/9/2022 Yes    Depression 6/9/2022 Yes    Hyperlipidemia 6/9/2022 Yes    Essential hypertension 6/9/2022 Yes    Parkinson disease ([address] 6/9/2022 Yes    Chronic pain of left knee 6/9/2022 Yes    Overweight (BMI 25.0-29.9) 6/9/2022 Yes          Plan:        1. Currently we are waiting on placement, discharge order placed 6/9  2. Lidocaine patch ordered for chest wall pain. Regarding \"burning\" pain, will start low dose gabapentin and see if this can help his symptoms. He is also on Elavil.   3. Renal function panel ordered to evaluate Scr after getting cardiac catheterization  4. Continue management of parkinson's disease with home medications.      Juan Antonio Ordonez DO  6/10/2022  11:04 AM Methotrexate Pregnancy And Lactation Text: This medication is Pregnancy Category X and is known to cause fetal harm. This medication is excreted in breast milk.

## 2023-05-17 ENCOUNTER — HOSPITAL ENCOUNTER (INPATIENT)
Age: 83
LOS: 12 days | Discharge: NURSING, REHAB OR LONG-TERM CARE | DRG: 057 | End: 2023-05-31
Attending: EMERGENCY MEDICINE | Admitting: EMERGENCY MEDICINE
Payer: MEDICARE

## 2023-05-17 ENCOUNTER — APPOINTMENT (OUTPATIENT)
Dept: GENERAL RADIOLOGY | Age: 83
DRG: 057 | End: 2023-05-17
Payer: MEDICARE

## 2023-05-17 ENCOUNTER — APPOINTMENT (OUTPATIENT)
Dept: CT IMAGING | Age: 83
DRG: 057 | End: 2023-05-17
Payer: MEDICARE

## 2023-05-17 DIAGNOSIS — R26.2 UNABLE TO AMBULATE: ICD-10-CM

## 2023-05-17 DIAGNOSIS — M25.552 LEFT HIP PAIN: Primary | ICD-10-CM

## 2023-05-17 LAB
SARS-COV-2 RDRP RESP QL NAA+PROBE: NOT DETECTED
SPECIMEN DESCRIPTION: NORMAL

## 2023-05-17 PROCEDURE — 6370000000 HC RX 637 (ALT 250 FOR IP): Performed by: STUDENT IN AN ORGANIZED HEALTH CARE EDUCATION/TRAINING PROGRAM

## 2023-05-17 PROCEDURE — 6360000002 HC RX W HCPCS: Performed by: STUDENT IN AN ORGANIZED HEALTH CARE EDUCATION/TRAINING PROGRAM

## 2023-05-17 PROCEDURE — G0378 HOSPITAL OBSERVATION PER HR: HCPCS

## 2023-05-17 PROCEDURE — 6370000000 HC RX 637 (ALT 250 FOR IP): Performed by: NURSE PRACTITIONER

## 2023-05-17 PROCEDURE — 73502 X-RAY EXAM HIP UNI 2-3 VIEWS: CPT

## 2023-05-17 PROCEDURE — 96372 THER/PROPH/DIAG INJ SC/IM: CPT

## 2023-05-17 PROCEDURE — 87635 SARS-COV-2 COVID-19 AMP PRB: CPT

## 2023-05-17 PROCEDURE — 70450 CT HEAD/BRAIN W/O DYE: CPT

## 2023-05-17 PROCEDURE — 72192 CT PELVIS W/O DYE: CPT

## 2023-05-17 PROCEDURE — 99285 EMERGENCY DEPT VISIT HI MDM: CPT

## 2023-05-17 PROCEDURE — 73562 X-RAY EXAM OF KNEE 3: CPT

## 2023-05-17 RX ORDER — IBUPROFEN 400 MG/1
400 TABLET ORAL EVERY 6 HOURS PRN
Status: DISCONTINUED | OUTPATIENT
Start: 2023-05-17 | End: 2023-05-18

## 2023-05-17 RX ORDER — ACETAMINOPHEN 500 MG
1000 TABLET ORAL ONCE
Status: COMPLETED | OUTPATIENT
Start: 2023-05-17 | End: 2023-05-17

## 2023-05-17 RX ORDER — SODIUM CHLORIDE 0.9 % (FLUSH) 0.9 %
5-40 SYRINGE (ML) INJECTION PRN
Status: DISCONTINUED | OUTPATIENT
Start: 2023-05-17 | End: 2023-05-31 | Stop reason: HOSPADM

## 2023-05-17 RX ORDER — ENTACAPONE 200 MG/1
200 TABLET ORAL 4 TIMES DAILY
Status: DISCONTINUED | OUTPATIENT
Start: 2023-05-17 | End: 2023-05-31 | Stop reason: HOSPADM

## 2023-05-17 RX ORDER — PANTOPRAZOLE SODIUM 40 MG/1
40 TABLET, DELAYED RELEASE ORAL
Status: DISCONTINUED | OUTPATIENT
Start: 2023-05-18 | End: 2023-05-31 | Stop reason: HOSPADM

## 2023-05-17 RX ORDER — POLYETHYLENE GLYCOL 3350 17 G/17G
17 POWDER, FOR SOLUTION ORAL DAILY PRN
Status: DISCONTINUED | OUTPATIENT
Start: 2023-05-17 | End: 2023-05-31 | Stop reason: HOSPADM

## 2023-05-17 RX ORDER — ISOSORBIDE MONONITRATE 30 MG/1
30 TABLET, EXTENDED RELEASE ORAL DAILY
Status: DISCONTINUED | OUTPATIENT
Start: 2023-05-17 | End: 2023-05-31 | Stop reason: HOSPADM

## 2023-05-17 RX ORDER — ENOXAPARIN SODIUM 100 MG/ML
40 INJECTION SUBCUTANEOUS DAILY
Status: DISCONTINUED | OUTPATIENT
Start: 2023-05-17 | End: 2023-05-31 | Stop reason: HOSPADM

## 2023-05-17 RX ORDER — CLOPIDOGREL BISULFATE 75 MG/1
75 TABLET ORAL DAILY
Status: DISCONTINUED | OUTPATIENT
Start: 2023-05-17 | End: 2023-05-31 | Stop reason: HOSPADM

## 2023-05-17 RX ORDER — ORPHENADRINE CITRATE 30 MG/ML
60 INJECTION INTRAMUSCULAR; INTRAVENOUS ONCE
Status: COMPLETED | OUTPATIENT
Start: 2023-05-17 | End: 2023-05-17

## 2023-05-17 RX ORDER — LANOLIN ALCOHOL/MO/W.PET/CERES
400 CREAM (GRAM) TOPICAL DAILY
Status: DISCONTINUED | OUTPATIENT
Start: 2023-05-17 | End: 2023-05-31 | Stop reason: HOSPADM

## 2023-05-17 RX ORDER — SODIUM CHLORIDE 9 MG/ML
25 INJECTION, SOLUTION INTRAVENOUS PRN
Status: DISCONTINUED | OUTPATIENT
Start: 2023-05-17 | End: 2023-05-31 | Stop reason: HOSPADM

## 2023-05-17 RX ORDER — POTASSIUM CHLORIDE 7.45 MG/ML
10 INJECTION INTRAVENOUS PRN
Status: DISCONTINUED | OUTPATIENT
Start: 2023-05-17 | End: 2023-05-31 | Stop reason: HOSPADM

## 2023-05-17 RX ORDER — ONDANSETRON 2 MG/ML
4 INJECTION INTRAMUSCULAR; INTRAVENOUS EVERY 4 HOURS PRN
Status: DISCONTINUED | OUTPATIENT
Start: 2023-05-17 | End: 2023-05-31 | Stop reason: HOSPADM

## 2023-05-17 RX ORDER — BACLOFEN 5 MG/1
5 TABLET ORAL 3 TIMES DAILY
Status: DISCONTINUED | OUTPATIENT
Start: 2023-05-17 | End: 2023-05-31 | Stop reason: HOSPADM

## 2023-05-17 RX ORDER — OXYCODONE HYDROCHLORIDE 5 MG/1
2.5 TABLET ORAL ONCE
Status: COMPLETED | OUTPATIENT
Start: 2023-05-17 | End: 2023-05-17

## 2023-05-17 RX ORDER — TAMSULOSIN HYDROCHLORIDE 0.4 MG/1
0.4 CAPSULE ORAL DAILY
Status: DISCONTINUED | OUTPATIENT
Start: 2023-05-17 | End: 2023-05-31 | Stop reason: HOSPADM

## 2023-05-17 RX ORDER — ACETAMINOPHEN 325 MG/1
650 TABLET ORAL EVERY 6 HOURS PRN
Status: DISCONTINUED | OUTPATIENT
Start: 2023-05-17 | End: 2023-05-31 | Stop reason: HOSPADM

## 2023-05-17 RX ORDER — ACETAMINOPHEN 650 MG/1
650 SUPPOSITORY RECTAL EVERY 6 HOURS PRN
Status: DISCONTINUED | OUTPATIENT
Start: 2023-05-17 | End: 2023-05-31 | Stop reason: HOSPADM

## 2023-05-17 RX ORDER — CHOLECALCIFEROL (VITAMIN D3) 125 MCG
5 CAPSULE ORAL NIGHTLY PRN
Status: DISCONTINUED | OUTPATIENT
Start: 2023-05-17 | End: 2023-05-31 | Stop reason: HOSPADM

## 2023-05-17 RX ORDER — ACETAMINOPHEN 325 MG/1
650 TABLET ORAL EVERY 6 HOURS PRN
Status: DISCONTINUED | OUTPATIENT
Start: 2023-05-18 | End: 2023-05-31 | Stop reason: HOSPADM

## 2023-05-17 RX ORDER — ATORVASTATIN CALCIUM 40 MG/1
40 TABLET, FILM COATED ORAL NIGHTLY
Status: DISCONTINUED | OUTPATIENT
Start: 2023-05-17 | End: 2023-05-31 | Stop reason: HOSPADM

## 2023-05-17 RX ORDER — BUTALBITAL, ACETAMINOPHEN AND CAFFEINE 50; 325; 40 MG/1; MG/1; MG/1
1 TABLET ORAL EVERY 6 HOURS PRN
Status: DISCONTINUED | OUTPATIENT
Start: 2023-05-17 | End: 2023-05-31 | Stop reason: HOSPADM

## 2023-05-17 RX ORDER — POTASSIUM CHLORIDE 20 MEQ/1
40 TABLET, EXTENDED RELEASE ORAL PRN
Status: DISCONTINUED | OUTPATIENT
Start: 2023-05-17 | End: 2023-05-31 | Stop reason: HOSPADM

## 2023-05-17 RX ORDER — SODIUM CHLORIDE 0.9 % (FLUSH) 0.9 %
5-40 SYRINGE (ML) INJECTION EVERY 12 HOURS SCHEDULED
Status: DISCONTINUED | OUTPATIENT
Start: 2023-05-17 | End: 2023-05-31 | Stop reason: HOSPADM

## 2023-05-17 RX ORDER — AMITRIPTYLINE HYDROCHLORIDE 10 MG/1
10 TABLET, FILM COATED ORAL NIGHTLY
Status: DISCONTINUED | OUTPATIENT
Start: 2023-05-17 | End: 2023-05-31 | Stop reason: HOSPADM

## 2023-05-17 RX ADMIN — DICLOFENAC 2 G: 10 GEL TOPICAL at 15:30

## 2023-05-17 RX ADMIN — Medication 5 MG: at 21:40

## 2023-05-17 RX ADMIN — OXYCODONE 2.5 MG: 5 TABLET ORAL at 15:08

## 2023-05-17 RX ADMIN — MAGNESIUM GLUCONATE 500 MG ORAL TABLET 400 MG: 500 TABLET ORAL at 21:40

## 2023-05-17 RX ADMIN — CARBIDOPA AND LEVODOPA 1 TABLET: 25; 100 TABLET ORAL at 21:52

## 2023-05-17 RX ADMIN — ACETAMINOPHEN 650 MG: 325 TABLET ORAL at 21:44

## 2023-05-17 RX ADMIN — AMITRIPTYLINE HYDROCHLORIDE 10 MG: 10 TABLET, FILM COATED ORAL at 21:40

## 2023-05-17 RX ADMIN — DESMOPRESSIN ACETATE 40 MG: 0.2 TABLET ORAL at 21:40

## 2023-05-17 RX ADMIN — BACLOFEN 5 MG: 5 TABLET ORAL at 21:40

## 2023-05-17 RX ADMIN — BACLOFEN 5 MG: 5 TABLET ORAL at 17:36

## 2023-05-17 RX ADMIN — IBUPROFEN 400 MG: 400 TABLET, FILM COATED ORAL at 21:45

## 2023-05-17 RX ADMIN — ENTACAPONE 200 MG: 200 TABLET, FILM COATED ORAL at 21:39

## 2023-05-17 RX ADMIN — ACETAMINOPHEN 1000 MG: 500 TABLET ORAL at 13:51

## 2023-05-17 RX ADMIN — ORPHENADRINE CITRATE 60 MG: 60 INJECTION INTRAMUSCULAR; INTRAVENOUS at 13:53

## 2023-05-17 ASSESSMENT — PAIN SCALES - GENERAL
PAINLEVEL_OUTOF10: 8
PAINLEVEL_OUTOF10: 6
PAINLEVEL_OUTOF10: 6

## 2023-05-17 ASSESSMENT — PAIN DESCRIPTION - ORIENTATION
ORIENTATION: LEFT
ORIENTATION: LEFT

## 2023-05-17 ASSESSMENT — PAIN - FUNCTIONAL ASSESSMENT: PAIN_FUNCTIONAL_ASSESSMENT: 0-10

## 2023-05-17 ASSESSMENT — ENCOUNTER SYMPTOMS
SHORTNESS OF BREATH: 0
BACK PAIN: 0
ABDOMINAL PAIN: 0

## 2023-05-17 ASSESSMENT — PAIN DESCRIPTION - LOCATION
LOCATION: KNEE;PELVIS
LOCATION: HIP;KNEE

## 2023-05-18 ENCOUNTER — APPOINTMENT (OUTPATIENT)
Dept: GENERAL RADIOLOGY | Age: 83
DRG: 057 | End: 2023-05-18
Payer: MEDICARE

## 2023-05-18 LAB
ANION GAP SERPL CALCULATED.3IONS-SCNC: 11 MMOL/L (ref 9–17)
BASOPHILS # BLD: <0.03 K/UL (ref 0–0.2)
BASOPHILS NFR BLD: 0 % (ref 0–2)
BUN SERPL-MCNC: 22 MG/DL (ref 8–23)
CALCIUM SERPL-MCNC: 9.3 MG/DL (ref 8.6–10.4)
CHLORIDE SERPL-SCNC: 101 MMOL/L (ref 98–107)
CK SERPL-CCNC: 406 U/L (ref 39–308)
CK SERPL-CCNC: 428 U/L (ref 39–308)
CO2 SERPL-SCNC: 22 MMOL/L (ref 20–31)
CREAT SERPL-MCNC: 1.5 MG/DL (ref 0.7–1.2)
EOSINOPHIL # BLD: 0.19 K/UL (ref 0–0.44)
EOSINOPHILS RELATIVE PERCENT: 4 % (ref 1–4)
ERYTHROCYTE [DISTWIDTH] IN BLOOD BY AUTOMATED COUNT: 14.1 % (ref 11.8–14.4)
GFR SERPL CREATININE-BSD FRML MDRD: 46 ML/MIN/1.73M2
GLUCOSE SERPL-MCNC: 129 MG/DL (ref 70–99)
HCT VFR BLD AUTO: 40.8 % (ref 40.7–50.3)
HGB BLD-MCNC: 13.5 G/DL (ref 13–17)
IMM GRANULOCYTES # BLD AUTO: <0.03 K/UL (ref 0–0.3)
IMM GRANULOCYTES NFR BLD: 0 %
LYMPHOCYTES # BLD: 30 % (ref 24–43)
LYMPHOCYTES NFR BLD: 1.6 K/UL (ref 1.1–3.7)
MCH RBC QN AUTO: 31.5 PG (ref 25.2–33.5)
MCHC RBC AUTO-ENTMCNC: 33.1 G/DL (ref 28.4–34.8)
MCV RBC AUTO: 95.3 FL (ref 82.6–102.9)
MONOCYTES NFR BLD: 0.66 K/UL (ref 0.1–1.2)
MONOCYTES NFR BLD: 12 % (ref 3–12)
NEUTROPHILS NFR BLD: 54 % (ref 36–65)
NEUTS SEG NFR BLD: 2.91 K/UL (ref 1.5–8.1)
NRBC AUTOMATED: 0 PER 100 WBC
PLATELET # BLD AUTO: 208 K/UL (ref 138–453)
PMV BLD AUTO: 8.7 FL (ref 8.1–13.5)
POTASSIUM SERPL-SCNC: 4.2 MMOL/L (ref 3.7–5.3)
RBC # BLD AUTO: 4.28 M/UL (ref 4.21–5.77)
SODIUM SERPL-SCNC: 134 MMOL/L (ref 135–144)
WBC OTHER # BLD: 5.4 K/UL (ref 3.5–11.3)

## 2023-05-18 PROCEDURE — 85025 COMPLETE CBC W/AUTO DIFF WBC: CPT

## 2023-05-18 PROCEDURE — 82550 ASSAY OF CK (CPK): CPT

## 2023-05-18 PROCEDURE — G0378 HOSPITAL OBSERVATION PER HR: HCPCS

## 2023-05-18 PROCEDURE — 97166 OT EVAL MOD COMPLEX 45 MIN: CPT

## 2023-05-18 PROCEDURE — 36415 COLL VENOUS BLD VENIPUNCTURE: CPT

## 2023-05-18 PROCEDURE — 97162 PT EVAL MOD COMPLEX 30 MIN: CPT

## 2023-05-18 PROCEDURE — 6360000002 HC RX W HCPCS: Performed by: STUDENT IN AN ORGANIZED HEALTH CARE EDUCATION/TRAINING PROGRAM

## 2023-05-18 PROCEDURE — 97530 THERAPEUTIC ACTIVITIES: CPT

## 2023-05-18 PROCEDURE — 72100 X-RAY EXAM L-S SPINE 2/3 VWS: CPT

## 2023-05-18 PROCEDURE — 6370000000 HC RX 637 (ALT 250 FOR IP): Performed by: NURSE PRACTITIONER

## 2023-05-18 PROCEDURE — 6370000000 HC RX 637 (ALT 250 FOR IP): Performed by: STUDENT IN AN ORGANIZED HEALTH CARE EDUCATION/TRAINING PROGRAM

## 2023-05-18 PROCEDURE — 80048 BASIC METABOLIC PNL TOTAL CA: CPT

## 2023-05-18 RX ADMIN — BACLOFEN 5 MG: 5 TABLET ORAL at 15:45

## 2023-05-18 RX ADMIN — ISOSORBIDE MONONITRATE 30 MG: 30 TABLET, EXTENDED RELEASE ORAL at 09:45

## 2023-05-18 RX ADMIN — TAMSULOSIN HYDROCHLORIDE 0.4 MG: 0.4 CAPSULE ORAL at 09:46

## 2023-05-18 RX ADMIN — CARBIDOPA AND LEVODOPA 1 TABLET: 25; 100 TABLET ORAL at 09:45

## 2023-05-18 RX ADMIN — ENOXAPARIN SODIUM 40 MG: 40 INJECTION SUBCUTANEOUS at 09:46

## 2023-05-18 RX ADMIN — BACLOFEN 5 MG: 5 TABLET ORAL at 09:45

## 2023-05-18 RX ADMIN — DESMOPRESSIN ACETATE 40 MG: 0.2 TABLET ORAL at 20:01

## 2023-05-18 RX ADMIN — ENTACAPONE 200 MG: 200 TABLET, FILM COATED ORAL at 15:45

## 2023-05-18 RX ADMIN — Medication 5 MG: at 22:11

## 2023-05-18 RX ADMIN — PANTOPRAZOLE SODIUM 40 MG: 40 TABLET, DELAYED RELEASE ORAL at 09:45

## 2023-05-18 RX ADMIN — CARBIDOPA AND LEVODOPA 1 TABLET: 25; 100 TABLET ORAL at 22:09

## 2023-05-18 RX ADMIN — CARBIDOPA AND LEVODOPA 1 TABLET: 25; 100 TABLET ORAL at 15:45

## 2023-05-18 RX ADMIN — BACLOFEN 5 MG: 5 TABLET ORAL at 20:01

## 2023-05-18 RX ADMIN — ENTACAPONE 200 MG: 200 TABLET, FILM COATED ORAL at 18:46

## 2023-05-18 RX ADMIN — ENTACAPONE 200 MG: 200 TABLET, FILM COATED ORAL at 09:46

## 2023-05-18 RX ADMIN — CARBIDOPA AND LEVODOPA 1 TABLET: 25; 100 TABLET ORAL at 18:46

## 2023-05-18 RX ADMIN — CLOPIDOGREL BISULFATE 75 MG: 75 TABLET, FILM COATED ORAL at 09:45

## 2023-05-18 RX ADMIN — ENTACAPONE 200 MG: 200 TABLET, FILM COATED ORAL at 22:10

## 2023-05-18 RX ADMIN — AMITRIPTYLINE HYDROCHLORIDE 10 MG: 10 TABLET, FILM COATED ORAL at 20:01

## 2023-05-18 RX ADMIN — ACETAMINOPHEN 650 MG: 325 TABLET ORAL at 22:10

## 2023-05-18 RX ADMIN — MAGNESIUM GLUCONATE 500 MG ORAL TABLET 400 MG: 500 TABLET ORAL at 09:45

## 2023-05-18 RX ADMIN — PANTOPRAZOLE SODIUM 40 MG: 40 TABLET, DELAYED RELEASE ORAL at 18:46

## 2023-05-18 ASSESSMENT — PAIN DESCRIPTION - LOCATION: LOCATION: GENERALIZED

## 2023-05-18 ASSESSMENT — PAIN SCALES - GENERAL
PAINLEVEL_OUTOF10: 4
PAINLEVEL_OUTOF10: 3

## 2023-05-19 PROBLEM — M62.82 RHABDOMYOLYSIS: Status: ACTIVE | Noted: 2023-05-19

## 2023-05-19 LAB
ANION GAP SERPL CALCULATED.3IONS-SCNC: 11 MMOL/L (ref 9–17)
BUN SERPL-MCNC: 20 MG/DL (ref 8–23)
CALCIUM SERPL-MCNC: 8.7 MG/DL (ref 8.6–10.4)
CHLORIDE SERPL-SCNC: 98 MMOL/L (ref 98–107)
CK SERPL-CCNC: 400 U/L (ref 39–308)
CO2 SERPL-SCNC: 22 MMOL/L (ref 20–31)
CREAT SERPL-MCNC: 1.52 MG/DL (ref 0.7–1.2)
GFR SERPL CREATININE-BSD FRML MDRD: 45 ML/MIN/1.73M2
GLUCOSE SERPL-MCNC: 132 MG/DL (ref 70–99)
POTASSIUM SERPL-SCNC: 4.4 MMOL/L (ref 3.7–5.3)
SODIUM SERPL-SCNC: 131 MMOL/L (ref 135–144)

## 2023-05-19 PROCEDURE — 6360000002 HC RX W HCPCS

## 2023-05-19 PROCEDURE — 36415 COLL VENOUS BLD VENIPUNCTURE: CPT

## 2023-05-19 PROCEDURE — 6360000002 HC RX W HCPCS: Performed by: STUDENT IN AN ORGANIZED HEALTH CARE EDUCATION/TRAINING PROGRAM

## 2023-05-19 PROCEDURE — 97116 GAIT TRAINING THERAPY: CPT

## 2023-05-19 PROCEDURE — 6370000000 HC RX 637 (ALT 250 FOR IP): Performed by: STUDENT IN AN ORGANIZED HEALTH CARE EDUCATION/TRAINING PROGRAM

## 2023-05-19 PROCEDURE — 80048 BASIC METABOLIC PNL TOTAL CA: CPT

## 2023-05-19 PROCEDURE — 82550 ASSAY OF CK (CPK): CPT

## 2023-05-19 PROCEDURE — 97110 THERAPEUTIC EXERCISES: CPT

## 2023-05-19 PROCEDURE — 2580000003 HC RX 258: Performed by: EMERGENCY MEDICINE

## 2023-05-19 PROCEDURE — 2580000003 HC RX 258: Performed by: STUDENT IN AN ORGANIZED HEALTH CARE EDUCATION/TRAINING PROGRAM

## 2023-05-19 PROCEDURE — 1200000000 HC SEMI PRIVATE

## 2023-05-19 RX ORDER — SODIUM CHLORIDE 9 MG/ML
INJECTION, SOLUTION INTRAVENOUS CONTINUOUS
Status: DISCONTINUED | OUTPATIENT
Start: 2023-05-19 | End: 2023-05-23

## 2023-05-19 RX ORDER — KETOROLAC TROMETHAMINE 30 MG/ML
30 INJECTION, SOLUTION INTRAMUSCULAR; INTRAVENOUS ONCE
Status: COMPLETED | OUTPATIENT
Start: 2023-05-19 | End: 2023-05-19

## 2023-05-19 RX ADMIN — CARBIDOPA AND LEVODOPA 1 TABLET: 25; 100 TABLET ORAL at 17:25

## 2023-05-19 RX ADMIN — PANTOPRAZOLE SODIUM 40 MG: 40 TABLET, DELAYED RELEASE ORAL at 09:15

## 2023-05-19 RX ADMIN — AMITRIPTYLINE HYDROCHLORIDE 10 MG: 10 TABLET, FILM COATED ORAL at 21:15

## 2023-05-19 RX ADMIN — TAMSULOSIN HYDROCHLORIDE 0.4 MG: 0.4 CAPSULE ORAL at 09:15

## 2023-05-19 RX ADMIN — CLOPIDOGREL BISULFATE 75 MG: 75 TABLET, FILM COATED ORAL at 09:15

## 2023-05-19 RX ADMIN — CARBIDOPA AND LEVODOPA 1 TABLET: 25; 100 TABLET ORAL at 21:15

## 2023-05-19 RX ADMIN — SODIUM CHLORIDE, PRESERVATIVE FREE 10 ML: 5 INJECTION INTRAVENOUS at 21:15

## 2023-05-19 RX ADMIN — CARBIDOPA AND LEVODOPA 1 TABLET: 25; 100 TABLET ORAL at 13:29

## 2023-05-19 RX ADMIN — PANTOPRAZOLE SODIUM 40 MG: 40 TABLET, DELAYED RELEASE ORAL at 17:25

## 2023-05-19 RX ADMIN — ENTACAPONE 200 MG: 200 TABLET, FILM COATED ORAL at 17:25

## 2023-05-19 RX ADMIN — KETOROLAC TROMETHAMINE 30 MG: 30 INJECTION, SOLUTION INTRAMUSCULAR; INTRAVENOUS at 13:34

## 2023-05-19 RX ADMIN — MAGNESIUM GLUCONATE 500 MG ORAL TABLET 400 MG: 500 TABLET ORAL at 09:15

## 2023-05-19 RX ADMIN — BACLOFEN 5 MG: 5 TABLET ORAL at 09:15

## 2023-05-19 RX ADMIN — DICLOFENAC 2 G: 10 GEL TOPICAL at 21:26

## 2023-05-19 RX ADMIN — BACLOFEN 5 MG: 5 TABLET ORAL at 21:15

## 2023-05-19 RX ADMIN — ENOXAPARIN SODIUM 40 MG: 40 INJECTION SUBCUTANEOUS at 09:15

## 2023-05-19 RX ADMIN — ISOSORBIDE MONONITRATE 30 MG: 30 TABLET, EXTENDED RELEASE ORAL at 09:15

## 2023-05-19 RX ADMIN — CARBIDOPA AND LEVODOPA 1 TABLET: 25; 100 TABLET ORAL at 09:15

## 2023-05-19 RX ADMIN — ENTACAPONE 200 MG: 200 TABLET, FILM COATED ORAL at 13:29

## 2023-05-19 RX ADMIN — SODIUM CHLORIDE: 9 INJECTION, SOLUTION INTRAVENOUS at 13:36

## 2023-05-19 RX ADMIN — ENTACAPONE 200 MG: 200 TABLET, FILM COATED ORAL at 21:15

## 2023-05-19 RX ADMIN — ENTACAPONE 200 MG: 200 TABLET, FILM COATED ORAL at 09:15

## 2023-05-19 RX ADMIN — BACLOFEN 5 MG: 5 TABLET ORAL at 13:29

## 2023-05-19 RX ADMIN — ACETAMINOPHEN 650 MG: 325 TABLET ORAL at 09:57

## 2023-05-19 RX ADMIN — DESMOPRESSIN ACETATE 40 MG: 0.2 TABLET ORAL at 21:15

## 2023-05-19 ASSESSMENT — PAIN DESCRIPTION - DESCRIPTORS: DESCRIPTORS: ACHING

## 2023-05-19 ASSESSMENT — PAIN SCALES - GENERAL
PAINLEVEL_OUTOF10: 4
PAINLEVEL_OUTOF10: 3
PAINLEVEL_OUTOF10: 6

## 2023-05-19 ASSESSMENT — PAIN DESCRIPTION - ORIENTATION: ORIENTATION: LOWER

## 2023-05-19 ASSESSMENT — PAIN DESCRIPTION - LOCATION
LOCATION: BACK
LOCATION: KNEE;BACK
LOCATION: BACK

## 2023-05-20 LAB
ANION GAP SERPL CALCULATED.3IONS-SCNC: 11 MMOL/L (ref 9–17)
ANION GAP SERPL CALCULATED.3IONS-SCNC: 12 MMOL/L (ref 9–17)
BUN SERPL-MCNC: 17 MG/DL (ref 8–23)
BUN SERPL-MCNC: 17 MG/DL (ref 8–23)
CALCIUM SERPL-MCNC: 8.8 MG/DL (ref 8.6–10.4)
CALCIUM SERPL-MCNC: 9 MG/DL (ref 8.6–10.4)
CHLORIDE SERPL-SCNC: 102 MMOL/L (ref 98–107)
CHLORIDE SERPL-SCNC: 104 MMOL/L (ref 98–107)
CK SERPL-CCNC: 343 U/L (ref 39–308)
CK SERPL-CCNC: 413 U/L (ref 39–308)
CO2 SERPL-SCNC: 21 MMOL/L (ref 20–31)
CO2 SERPL-SCNC: 21 MMOL/L (ref 20–31)
CREAT SERPL-MCNC: 1.24 MG/DL (ref 0.7–1.2)
CREAT SERPL-MCNC: 1.32 MG/DL (ref 0.7–1.2)
GFR SERPL CREATININE-BSD FRML MDRD: 54 ML/MIN/1.73M2
GFR SERPL CREATININE-BSD FRML MDRD: 58 ML/MIN/1.73M2
GLUCOSE SERPL-MCNC: 124 MG/DL (ref 70–99)
GLUCOSE SERPL-MCNC: 132 MG/DL (ref 70–99)
POTASSIUM SERPL-SCNC: 4.2 MMOL/L (ref 3.7–5.3)
POTASSIUM SERPL-SCNC: 4.9 MMOL/L (ref 3.7–5.3)
SODIUM SERPL-SCNC: 135 MMOL/L (ref 135–144)
SODIUM SERPL-SCNC: 136 MMOL/L (ref 135–144)

## 2023-05-20 PROCEDURE — 6370000000 HC RX 637 (ALT 250 FOR IP): Performed by: STUDENT IN AN ORGANIZED HEALTH CARE EDUCATION/TRAINING PROGRAM

## 2023-05-20 PROCEDURE — 2580000003 HC RX 258: Performed by: EMERGENCY MEDICINE

## 2023-05-20 PROCEDURE — 80048 BASIC METABOLIC PNL TOTAL CA: CPT

## 2023-05-20 PROCEDURE — 6360000002 HC RX W HCPCS: Performed by: STUDENT IN AN ORGANIZED HEALTH CARE EDUCATION/TRAINING PROGRAM

## 2023-05-20 PROCEDURE — 36415 COLL VENOUS BLD VENIPUNCTURE: CPT

## 2023-05-20 PROCEDURE — 82550 ASSAY OF CK (CPK): CPT

## 2023-05-20 PROCEDURE — 1200000000 HC SEMI PRIVATE

## 2023-05-20 PROCEDURE — 2580000003 HC RX 258: Performed by: STUDENT IN AN ORGANIZED HEALTH CARE EDUCATION/TRAINING PROGRAM

## 2023-05-20 RX ADMIN — MAGNESIUM GLUCONATE 500 MG ORAL TABLET 400 MG: 500 TABLET ORAL at 09:33

## 2023-05-20 RX ADMIN — DICLOFENAC 2 G: 10 GEL TOPICAL at 20:18

## 2023-05-20 RX ADMIN — AMITRIPTYLINE HYDROCHLORIDE 10 MG: 10 TABLET, FILM COATED ORAL at 20:17

## 2023-05-20 RX ADMIN — TAMSULOSIN HYDROCHLORIDE 0.4 MG: 0.4 CAPSULE ORAL at 09:33

## 2023-05-20 RX ADMIN — ENTACAPONE 200 MG: 200 TABLET, FILM COATED ORAL at 14:17

## 2023-05-20 RX ADMIN — SODIUM CHLORIDE: 9 INJECTION, SOLUTION INTRAVENOUS at 10:37

## 2023-05-20 RX ADMIN — CARBIDOPA AND LEVODOPA 1 TABLET: 25; 100 TABLET ORAL at 20:17

## 2023-05-20 RX ADMIN — CARBIDOPA AND LEVODOPA 1 TABLET: 25; 100 TABLET ORAL at 09:33

## 2023-05-20 RX ADMIN — ENOXAPARIN SODIUM 40 MG: 40 INJECTION SUBCUTANEOUS at 09:33

## 2023-05-20 RX ADMIN — DESMOPRESSIN ACETATE 40 MG: 0.2 TABLET ORAL at 20:17

## 2023-05-20 RX ADMIN — BACLOFEN 5 MG: 5 TABLET ORAL at 14:17

## 2023-05-20 RX ADMIN — ENTACAPONE 200 MG: 200 TABLET, FILM COATED ORAL at 17:36

## 2023-05-20 RX ADMIN — SODIUM CHLORIDE, PRESERVATIVE FREE 10 ML: 5 INJECTION INTRAVENOUS at 09:33

## 2023-05-20 RX ADMIN — DICLOFENAC 2 G: 10 GEL TOPICAL at 14:20

## 2023-05-20 RX ADMIN — ISOSORBIDE MONONITRATE 30 MG: 30 TABLET, EXTENDED RELEASE ORAL at 09:33

## 2023-05-20 RX ADMIN — BENZOCAINE AND MENTHOL 1 LOZENGE: 15; 3.6 LOZENGE ORAL at 00:02

## 2023-05-20 RX ADMIN — ENTACAPONE 200 MG: 200 TABLET, FILM COATED ORAL at 20:18

## 2023-05-20 RX ADMIN — ACETAMINOPHEN 650 MG: 325 TABLET ORAL at 10:40

## 2023-05-20 RX ADMIN — PANTOPRAZOLE SODIUM 40 MG: 40 TABLET, DELAYED RELEASE ORAL at 17:36

## 2023-05-20 RX ADMIN — CARBIDOPA AND LEVODOPA 1 TABLET: 25; 100 TABLET ORAL at 17:37

## 2023-05-20 RX ADMIN — ENTACAPONE 200 MG: 200 TABLET, FILM COATED ORAL at 09:33

## 2023-05-20 RX ADMIN — CARBIDOPA AND LEVODOPA 1 TABLET: 25; 100 TABLET ORAL at 14:17

## 2023-05-20 RX ADMIN — BACLOFEN 5 MG: 5 TABLET ORAL at 20:17

## 2023-05-20 RX ADMIN — PANTOPRAZOLE SODIUM 40 MG: 40 TABLET, DELAYED RELEASE ORAL at 07:17

## 2023-05-20 RX ADMIN — BACLOFEN 5 MG: 5 TABLET ORAL at 09:34

## 2023-05-20 RX ADMIN — CLOPIDOGREL BISULFATE 75 MG: 75 TABLET, FILM COATED ORAL at 09:33

## 2023-05-20 ASSESSMENT — PAIN SCALES - GENERAL
PAINLEVEL_OUTOF10: 5
PAINLEVEL_OUTOF10: 6
PAINLEVEL_OUTOF10: 4

## 2023-05-21 LAB
ANION GAP SERPL CALCULATED.3IONS-SCNC: 11 MMOL/L (ref 9–17)
ANION GAP SERPL CALCULATED.3IONS-SCNC: 13 MMOL/L (ref 9–17)
BUN SERPL-MCNC: 14 MG/DL (ref 8–23)
BUN SERPL-MCNC: 15 MG/DL (ref 8–23)
CALCIUM SERPL-MCNC: 9 MG/DL (ref 8.6–10.4)
CALCIUM SERPL-MCNC: 9.2 MG/DL (ref 8.6–10.4)
CHLORIDE SERPL-SCNC: 100 MMOL/L (ref 98–107)
CHLORIDE SERPL-SCNC: 105 MMOL/L (ref 98–107)
CK SERPL-CCNC: 237 U/L (ref 39–308)
CO2 SERPL-SCNC: 20 MMOL/L (ref 20–31)
CO2 SERPL-SCNC: 24 MMOL/L (ref 20–31)
CREAT SERPL-MCNC: 1.2 MG/DL (ref 0.7–1.2)
CREAT SERPL-MCNC: 1.22 MG/DL (ref 0.7–1.2)
GFR SERPL CREATININE-BSD FRML MDRD: 59 ML/MIN/1.73M2
GFR SERPL CREATININE-BSD FRML MDRD: >60 ML/MIN/1.73M2
GLUCOSE SERPL-MCNC: 121 MG/DL (ref 70–99)
GLUCOSE SERPL-MCNC: 149 MG/DL (ref 70–99)
POTASSIUM SERPL-SCNC: 4.3 MMOL/L (ref 3.7–5.3)
POTASSIUM SERPL-SCNC: 4.5 MMOL/L (ref 3.7–5.3)
SODIUM SERPL-SCNC: 135 MMOL/L (ref 135–144)
SODIUM SERPL-SCNC: 138 MMOL/L (ref 135–144)

## 2023-05-21 PROCEDURE — 36415 COLL VENOUS BLD VENIPUNCTURE: CPT

## 2023-05-21 PROCEDURE — 6370000000 HC RX 637 (ALT 250 FOR IP): Performed by: NURSE PRACTITIONER

## 2023-05-21 PROCEDURE — 82550 ASSAY OF CK (CPK): CPT

## 2023-05-21 PROCEDURE — 2580000003 HC RX 258: Performed by: STUDENT IN AN ORGANIZED HEALTH CARE EDUCATION/TRAINING PROGRAM

## 2023-05-21 PROCEDURE — 6370000000 HC RX 637 (ALT 250 FOR IP): Performed by: STUDENT IN AN ORGANIZED HEALTH CARE EDUCATION/TRAINING PROGRAM

## 2023-05-21 PROCEDURE — 80048 BASIC METABOLIC PNL TOTAL CA: CPT

## 2023-05-21 PROCEDURE — 6360000002 HC RX W HCPCS: Performed by: STUDENT IN AN ORGANIZED HEALTH CARE EDUCATION/TRAINING PROGRAM

## 2023-05-21 PROCEDURE — 1200000000 HC SEMI PRIVATE

## 2023-05-21 RX ADMIN — CARBIDOPA AND LEVODOPA 1 TABLET: 25; 100 TABLET ORAL at 17:37

## 2023-05-21 RX ADMIN — BACLOFEN 5 MG: 5 TABLET ORAL at 09:23

## 2023-05-21 RX ADMIN — ISOSORBIDE MONONITRATE 30 MG: 30 TABLET, EXTENDED RELEASE ORAL at 09:23

## 2023-05-21 RX ADMIN — BACLOFEN 5 MG: 5 TABLET ORAL at 22:12

## 2023-05-21 RX ADMIN — BACLOFEN 5 MG: 5 TABLET ORAL at 13:26

## 2023-05-21 RX ADMIN — Medication 5 MG: at 20:33

## 2023-05-21 RX ADMIN — CLOPIDOGREL BISULFATE 75 MG: 75 TABLET, FILM COATED ORAL at 09:23

## 2023-05-21 RX ADMIN — AMITRIPTYLINE HYDROCHLORIDE 10 MG: 10 TABLET, FILM COATED ORAL at 20:33

## 2023-05-21 RX ADMIN — ENTACAPONE 200 MG: 200 TABLET, FILM COATED ORAL at 09:23

## 2023-05-21 RX ADMIN — MAGNESIUM GLUCONATE 500 MG ORAL TABLET 400 MG: 500 TABLET ORAL at 09:23

## 2023-05-21 RX ADMIN — SODIUM CHLORIDE, PRESERVATIVE FREE 10 ML: 5 INJECTION INTRAVENOUS at 20:32

## 2023-05-21 RX ADMIN — PANTOPRAZOLE SODIUM 40 MG: 40 TABLET, DELAYED RELEASE ORAL at 17:37

## 2023-05-21 RX ADMIN — DICLOFENAC 2 G: 10 GEL TOPICAL at 07:44

## 2023-05-21 RX ADMIN — DESMOPRESSIN ACETATE 40 MG: 0.2 TABLET ORAL at 20:33

## 2023-05-21 RX ADMIN — CARBIDOPA AND LEVODOPA 1 TABLET: 25; 100 TABLET ORAL at 09:23

## 2023-05-21 RX ADMIN — CARBIDOPA AND LEVODOPA 1 TABLET: 25; 100 TABLET ORAL at 13:26

## 2023-05-21 RX ADMIN — PANTOPRAZOLE SODIUM 40 MG: 40 TABLET, DELAYED RELEASE ORAL at 07:04

## 2023-05-21 RX ADMIN — DICLOFENAC 2 G: 10 GEL TOPICAL at 20:33

## 2023-05-21 RX ADMIN — ENTACAPONE 200 MG: 200 TABLET, FILM COATED ORAL at 13:26

## 2023-05-21 RX ADMIN — TAMSULOSIN HYDROCHLORIDE 0.4 MG: 0.4 CAPSULE ORAL at 09:23

## 2023-05-21 RX ADMIN — SODIUM CHLORIDE, PRESERVATIVE FREE 10 ML: 5 INJECTION INTRAVENOUS at 09:22

## 2023-05-21 RX ADMIN — ENTACAPONE 200 MG: 200 TABLET, FILM COATED ORAL at 20:33

## 2023-05-21 RX ADMIN — ENOXAPARIN SODIUM 40 MG: 40 INJECTION SUBCUTANEOUS at 09:22

## 2023-05-21 RX ADMIN — ENTACAPONE 200 MG: 200 TABLET, FILM COATED ORAL at 17:37

## 2023-05-21 RX ADMIN — Medication 5 MG: at 01:48

## 2023-05-21 RX ADMIN — CARBIDOPA AND LEVODOPA 1 TABLET: 25; 100 TABLET ORAL at 20:33

## 2023-05-21 ASSESSMENT — PAIN SCALES - GENERAL: PAINLEVEL_OUTOF10: 4

## 2023-05-22 LAB
ANION GAP SERPL CALCULATED.3IONS-SCNC: 10 MMOL/L (ref 9–17)
BUN SERPL-MCNC: 13 MG/DL (ref 8–23)
CALCIUM SERPL-MCNC: 9.2 MG/DL (ref 8.6–10.4)
CHLORIDE SERPL-SCNC: 104 MMOL/L (ref 98–107)
CO2 SERPL-SCNC: 23 MMOL/L (ref 20–31)
CREAT SERPL-MCNC: 1.22 MG/DL (ref 0.7–1.2)
GFR SERPL CREATININE-BSD FRML MDRD: 59 ML/MIN/1.73M2
GLUCOSE SERPL-MCNC: 119 MG/DL (ref 70–99)
POTASSIUM SERPL-SCNC: 4.8 MMOL/L (ref 3.7–5.3)
SODIUM SERPL-SCNC: 137 MMOL/L (ref 135–144)

## 2023-05-22 PROCEDURE — 6360000002 HC RX W HCPCS: Performed by: STUDENT IN AN ORGANIZED HEALTH CARE EDUCATION/TRAINING PROGRAM

## 2023-05-22 PROCEDURE — 97116 GAIT TRAINING THERAPY: CPT

## 2023-05-22 PROCEDURE — 1200000000 HC SEMI PRIVATE

## 2023-05-22 PROCEDURE — 36415 COLL VENOUS BLD VENIPUNCTURE: CPT

## 2023-05-22 PROCEDURE — 97530 THERAPEUTIC ACTIVITIES: CPT

## 2023-05-22 PROCEDURE — 97110 THERAPEUTIC EXERCISES: CPT

## 2023-05-22 PROCEDURE — 80048 BASIC METABOLIC PNL TOTAL CA: CPT

## 2023-05-22 PROCEDURE — 6370000000 HC RX 637 (ALT 250 FOR IP): Performed by: STUDENT IN AN ORGANIZED HEALTH CARE EDUCATION/TRAINING PROGRAM

## 2023-05-22 PROCEDURE — 2580000003 HC RX 258: Performed by: EMERGENCY MEDICINE

## 2023-05-22 PROCEDURE — 2580000003 HC RX 258: Performed by: STUDENT IN AN ORGANIZED HEALTH CARE EDUCATION/TRAINING PROGRAM

## 2023-05-22 RX ADMIN — BACLOFEN 5 MG: 5 TABLET ORAL at 20:05

## 2023-05-22 RX ADMIN — CARBIDOPA AND LEVODOPA 1 TABLET: 25; 100 TABLET ORAL at 21:19

## 2023-05-22 RX ADMIN — PANTOPRAZOLE SODIUM 40 MG: 40 TABLET, DELAYED RELEASE ORAL at 08:09

## 2023-05-22 RX ADMIN — ENOXAPARIN SODIUM 40 MG: 40 INJECTION SUBCUTANEOUS at 09:02

## 2023-05-22 RX ADMIN — AMITRIPTYLINE HYDROCHLORIDE 10 MG: 10 TABLET, FILM COATED ORAL at 20:05

## 2023-05-22 RX ADMIN — BACLOFEN 5 MG: 5 TABLET ORAL at 09:02

## 2023-05-22 RX ADMIN — CARBIDOPA AND LEVODOPA 1 TABLET: 25; 100 TABLET ORAL at 17:50

## 2023-05-22 RX ADMIN — ACETAMINOPHEN 650 MG: 325 TABLET ORAL at 16:06

## 2023-05-22 RX ADMIN — ISOSORBIDE MONONITRATE 30 MG: 30 TABLET, EXTENDED RELEASE ORAL at 09:02

## 2023-05-22 RX ADMIN — CARBIDOPA AND LEVODOPA 1 TABLET: 25; 100 TABLET ORAL at 13:30

## 2023-05-22 RX ADMIN — CLOPIDOGREL BISULFATE 75 MG: 75 TABLET, FILM COATED ORAL at 11:00

## 2023-05-22 RX ADMIN — ENTACAPONE 200 MG: 200 TABLET, FILM COATED ORAL at 09:02

## 2023-05-22 RX ADMIN — SODIUM CHLORIDE, PRESERVATIVE FREE 10 ML: 5 INJECTION INTRAVENOUS at 09:02

## 2023-05-22 RX ADMIN — DICLOFENAC 2 G: 10 GEL TOPICAL at 11:48

## 2023-05-22 RX ADMIN — CARBIDOPA AND LEVODOPA 1 TABLET: 25; 100 TABLET ORAL at 09:02

## 2023-05-22 RX ADMIN — TAMSULOSIN HYDROCHLORIDE 0.4 MG: 0.4 CAPSULE ORAL at 09:02

## 2023-05-22 RX ADMIN — ENTACAPONE 200 MG: 200 TABLET, FILM COATED ORAL at 21:19

## 2023-05-22 RX ADMIN — BACLOFEN 5 MG: 5 TABLET ORAL at 13:30

## 2023-05-22 RX ADMIN — SODIUM CHLORIDE, PRESERVATIVE FREE 10 ML: 5 INJECTION INTRAVENOUS at 20:04

## 2023-05-22 RX ADMIN — DESMOPRESSIN ACETATE 40 MG: 0.2 TABLET ORAL at 20:05

## 2023-05-22 RX ADMIN — ENTACAPONE 200 MG: 200 TABLET, FILM COATED ORAL at 13:30

## 2023-05-22 RX ADMIN — ENTACAPONE 200 MG: 200 TABLET, FILM COATED ORAL at 17:50

## 2023-05-22 RX ADMIN — PANTOPRAZOLE SODIUM 40 MG: 40 TABLET, DELAYED RELEASE ORAL at 16:06

## 2023-05-22 RX ADMIN — MAGNESIUM GLUCONATE 500 MG ORAL TABLET 400 MG: 500 TABLET ORAL at 09:02

## 2023-05-22 RX ADMIN — SODIUM CHLORIDE: 9 INJECTION, SOLUTION INTRAVENOUS at 21:24

## 2023-05-22 ASSESSMENT — PAIN SCALES - GENERAL
PAINLEVEL_OUTOF10: 5
PAINLEVEL_OUTOF10: 5

## 2023-05-22 ASSESSMENT — PAIN DESCRIPTION - LOCATION: LOCATION: KNEE

## 2023-05-23 PROCEDURE — 6370000000 HC RX 637 (ALT 250 FOR IP): Performed by: STUDENT IN AN ORGANIZED HEALTH CARE EDUCATION/TRAINING PROGRAM

## 2023-05-23 PROCEDURE — 6360000002 HC RX W HCPCS: Performed by: STUDENT IN AN ORGANIZED HEALTH CARE EDUCATION/TRAINING PROGRAM

## 2023-05-23 PROCEDURE — 1200000000 HC SEMI PRIVATE

## 2023-05-23 PROCEDURE — 97530 THERAPEUTIC ACTIVITIES: CPT

## 2023-05-23 PROCEDURE — 97535 SELF CARE MNGMENT TRAINING: CPT

## 2023-05-23 PROCEDURE — 97116 GAIT TRAINING THERAPY: CPT

## 2023-05-23 PROCEDURE — 2580000003 HC RX 258: Performed by: STUDENT IN AN ORGANIZED HEALTH CARE EDUCATION/TRAINING PROGRAM

## 2023-05-23 PROCEDURE — 97110 THERAPEUTIC EXERCISES: CPT

## 2023-05-23 PROCEDURE — 6370000000 HC RX 637 (ALT 250 FOR IP): Performed by: NURSE PRACTITIONER

## 2023-05-23 RX ADMIN — ENOXAPARIN SODIUM 40 MG: 40 INJECTION SUBCUTANEOUS at 08:30

## 2023-05-23 RX ADMIN — ACETAMINOPHEN 650 MG: 325 TABLET ORAL at 08:36

## 2023-05-23 RX ADMIN — CARBIDOPA AND LEVODOPA 1 TABLET: 25; 100 TABLET ORAL at 13:24

## 2023-05-23 RX ADMIN — ENTACAPONE 200 MG: 200 TABLET, FILM COATED ORAL at 21:05

## 2023-05-23 RX ADMIN — BACLOFEN 5 MG: 5 TABLET ORAL at 13:24

## 2023-05-23 RX ADMIN — ENTACAPONE 200 MG: 200 TABLET, FILM COATED ORAL at 08:28

## 2023-05-23 RX ADMIN — BACLOFEN 5 MG: 5 TABLET ORAL at 21:05

## 2023-05-23 RX ADMIN — CLOPIDOGREL BISULFATE 75 MG: 75 TABLET, FILM COATED ORAL at 08:28

## 2023-05-23 RX ADMIN — BENZOCAINE AND MENTHOL 1 LOZENGE: 15; 3.6 LOZENGE ORAL at 23:04

## 2023-05-23 RX ADMIN — MAGNESIUM GLUCONATE 500 MG ORAL TABLET 400 MG: 500 TABLET ORAL at 08:27

## 2023-05-23 RX ADMIN — PANTOPRAZOLE SODIUM 40 MG: 40 TABLET, DELAYED RELEASE ORAL at 05:02

## 2023-05-23 RX ADMIN — ENTACAPONE 200 MG: 200 TABLET, FILM COATED ORAL at 13:24

## 2023-05-23 RX ADMIN — DICLOFENAC 2 G: 10 GEL TOPICAL at 21:06

## 2023-05-23 RX ADMIN — PANTOPRAZOLE SODIUM 40 MG: 40 TABLET, DELAYED RELEASE ORAL at 15:49

## 2023-05-23 RX ADMIN — DESMOPRESSIN ACETATE 40 MG: 0.2 TABLET ORAL at 21:05

## 2023-05-23 RX ADMIN — ENTACAPONE 200 MG: 200 TABLET, FILM COATED ORAL at 17:08

## 2023-05-23 RX ADMIN — CARBIDOPA AND LEVODOPA 1 TABLET: 25; 100 TABLET ORAL at 08:28

## 2023-05-23 RX ADMIN — ISOSORBIDE MONONITRATE 30 MG: 30 TABLET, EXTENDED RELEASE ORAL at 08:27

## 2023-05-23 RX ADMIN — TAMSULOSIN HYDROCHLORIDE 0.4 MG: 0.4 CAPSULE ORAL at 08:27

## 2023-05-23 RX ADMIN — AMITRIPTYLINE HYDROCHLORIDE 10 MG: 10 TABLET, FILM COATED ORAL at 21:05

## 2023-05-23 RX ADMIN — BACLOFEN 5 MG: 5 TABLET ORAL at 08:27

## 2023-05-23 RX ADMIN — ONDANSETRON 4 MG: 2 INJECTION INTRAMUSCULAR; INTRAVENOUS at 09:39

## 2023-05-23 RX ADMIN — Medication 5 MG: at 22:05

## 2023-05-23 RX ADMIN — SODIUM CHLORIDE, PRESERVATIVE FREE 10 ML: 5 INJECTION INTRAVENOUS at 21:05

## 2023-05-23 RX ADMIN — CARBIDOPA AND LEVODOPA 1 TABLET: 25; 100 TABLET ORAL at 21:05

## 2023-05-23 RX ADMIN — CARBIDOPA AND LEVODOPA 1 TABLET: 25; 100 TABLET ORAL at 17:07

## 2023-05-23 ASSESSMENT — PAIN DESCRIPTION - LOCATION
LOCATION: HEAD;HIP;KNEE
LOCATION: HEAD

## 2023-05-23 ASSESSMENT — PAIN SCALES - GENERAL
PAINLEVEL_OUTOF10: 5
PAINLEVEL_OUTOF10: 5
PAINLEVEL_OUTOF10: 4
PAINLEVEL_OUTOF10: 6

## 2023-05-23 ASSESSMENT — PAIN DESCRIPTION - DESCRIPTORS: DESCRIPTORS: ACHING;DISCOMFORT

## 2023-05-23 ASSESSMENT — PAIN DESCRIPTION - ORIENTATION: ORIENTATION: LEFT;MID

## 2023-05-24 PROCEDURE — 6370000000 HC RX 637 (ALT 250 FOR IP): Performed by: NURSE PRACTITIONER

## 2023-05-24 PROCEDURE — 97110 THERAPEUTIC EXERCISES: CPT

## 2023-05-24 PROCEDURE — 1200000000 HC SEMI PRIVATE

## 2023-05-24 PROCEDURE — 97530 THERAPEUTIC ACTIVITIES: CPT

## 2023-05-24 PROCEDURE — 6360000002 HC RX W HCPCS: Performed by: STUDENT IN AN ORGANIZED HEALTH CARE EDUCATION/TRAINING PROGRAM

## 2023-05-24 PROCEDURE — 2580000003 HC RX 258: Performed by: STUDENT IN AN ORGANIZED HEALTH CARE EDUCATION/TRAINING PROGRAM

## 2023-05-24 PROCEDURE — 6370000000 HC RX 637 (ALT 250 FOR IP): Performed by: STUDENT IN AN ORGANIZED HEALTH CARE EDUCATION/TRAINING PROGRAM

## 2023-05-24 PROCEDURE — 97116 GAIT TRAINING THERAPY: CPT

## 2023-05-24 RX ADMIN — CARBIDOPA AND LEVODOPA 1 TABLET: 25; 100 TABLET ORAL at 09:17

## 2023-05-24 RX ADMIN — CLOPIDOGREL BISULFATE 75 MG: 75 TABLET, FILM COATED ORAL at 09:18

## 2023-05-24 RX ADMIN — BACLOFEN 5 MG: 5 TABLET ORAL at 20:40

## 2023-05-24 RX ADMIN — AMITRIPTYLINE HYDROCHLORIDE 10 MG: 10 TABLET, FILM COATED ORAL at 20:40

## 2023-05-24 RX ADMIN — SODIUM CHLORIDE, PRESERVATIVE FREE 10 ML: 5 INJECTION INTRAVENOUS at 20:40

## 2023-05-24 RX ADMIN — CARBIDOPA AND LEVODOPA 1 TABLET: 25; 100 TABLET ORAL at 18:09

## 2023-05-24 RX ADMIN — ENTACAPONE 200 MG: 200 TABLET, FILM COATED ORAL at 18:09

## 2023-05-24 RX ADMIN — BACLOFEN 5 MG: 5 TABLET ORAL at 14:50

## 2023-05-24 RX ADMIN — ISOSORBIDE MONONITRATE 30 MG: 30 TABLET, EXTENDED RELEASE ORAL at 09:17

## 2023-05-24 RX ADMIN — ENOXAPARIN SODIUM 40 MG: 40 INJECTION SUBCUTANEOUS at 09:17

## 2023-05-24 RX ADMIN — SODIUM CHLORIDE, PRESERVATIVE FREE 10 ML: 5 INJECTION INTRAVENOUS at 09:19

## 2023-05-24 RX ADMIN — Medication 5 MG: at 20:44

## 2023-05-24 RX ADMIN — ENTACAPONE 200 MG: 200 TABLET, FILM COATED ORAL at 14:50

## 2023-05-24 RX ADMIN — DICLOFENAC 2 G: 10 GEL TOPICAL at 09:19

## 2023-05-24 RX ADMIN — DICLOFENAC 2 G: 10 GEL TOPICAL at 20:40

## 2023-05-24 RX ADMIN — DESMOPRESSIN ACETATE 40 MG: 0.2 TABLET ORAL at 20:40

## 2023-05-24 RX ADMIN — MAGNESIUM GLUCONATE 500 MG ORAL TABLET 400 MG: 500 TABLET ORAL at 09:17

## 2023-05-24 RX ADMIN — TAMSULOSIN HYDROCHLORIDE 0.4 MG: 0.4 CAPSULE ORAL at 09:17

## 2023-05-24 RX ADMIN — BACLOFEN 5 MG: 5 TABLET ORAL at 09:18

## 2023-05-24 RX ADMIN — CARBIDOPA AND LEVODOPA 1 TABLET: 25; 100 TABLET ORAL at 20:40

## 2023-05-24 RX ADMIN — ENTACAPONE 200 MG: 200 TABLET, FILM COATED ORAL at 20:40

## 2023-05-24 RX ADMIN — PANTOPRAZOLE SODIUM 40 MG: 40 TABLET, DELAYED RELEASE ORAL at 05:40

## 2023-05-24 RX ADMIN — ENTACAPONE 200 MG: 200 TABLET, FILM COATED ORAL at 09:18

## 2023-05-24 RX ADMIN — PANTOPRAZOLE SODIUM 40 MG: 40 TABLET, DELAYED RELEASE ORAL at 18:09

## 2023-05-24 RX ADMIN — CARBIDOPA AND LEVODOPA 1 TABLET: 25; 100 TABLET ORAL at 14:50

## 2023-05-24 ASSESSMENT — PAIN SCALES - GENERAL: PAINLEVEL_OUTOF10: 5

## 2023-05-25 PROCEDURE — 97116 GAIT TRAINING THERAPY: CPT

## 2023-05-25 PROCEDURE — 1200000000 HC SEMI PRIVATE

## 2023-05-25 PROCEDURE — 97110 THERAPEUTIC EXERCISES: CPT

## 2023-05-25 PROCEDURE — 2580000003 HC RX 258: Performed by: STUDENT IN AN ORGANIZED HEALTH CARE EDUCATION/TRAINING PROGRAM

## 2023-05-25 PROCEDURE — 6370000000 HC RX 637 (ALT 250 FOR IP): Performed by: STUDENT IN AN ORGANIZED HEALTH CARE EDUCATION/TRAINING PROGRAM

## 2023-05-25 PROCEDURE — 6360000002 HC RX W HCPCS: Performed by: STUDENT IN AN ORGANIZED HEALTH CARE EDUCATION/TRAINING PROGRAM

## 2023-05-25 PROCEDURE — 97530 THERAPEUTIC ACTIVITIES: CPT

## 2023-05-25 RX ADMIN — CLOPIDOGREL BISULFATE 75 MG: 75 TABLET, FILM COATED ORAL at 08:51

## 2023-05-25 RX ADMIN — BACLOFEN 5 MG: 5 TABLET ORAL at 19:41

## 2023-05-25 RX ADMIN — ENTACAPONE 200 MG: 200 TABLET, FILM COATED ORAL at 13:20

## 2023-05-25 RX ADMIN — TAMSULOSIN HYDROCHLORIDE 0.4 MG: 0.4 CAPSULE ORAL at 08:50

## 2023-05-25 RX ADMIN — SODIUM CHLORIDE, PRESERVATIVE FREE 10 ML: 5 INJECTION INTRAVENOUS at 19:41

## 2023-05-25 RX ADMIN — BACLOFEN 5 MG: 5 TABLET ORAL at 13:20

## 2023-05-25 RX ADMIN — DESMOPRESSIN ACETATE 40 MG: 0.2 TABLET ORAL at 19:41

## 2023-05-25 RX ADMIN — BACLOFEN 5 MG: 5 TABLET ORAL at 08:50

## 2023-05-25 RX ADMIN — BENZOCAINE AND MENTHOL 1 LOZENGE: 15; 3.6 LOZENGE ORAL at 00:44

## 2023-05-25 RX ADMIN — PANTOPRAZOLE SODIUM 40 MG: 40 TABLET, DELAYED RELEASE ORAL at 05:50

## 2023-05-25 RX ADMIN — SODIUM CHLORIDE, PRESERVATIVE FREE 10 ML: 5 INJECTION INTRAVENOUS at 08:50

## 2023-05-25 RX ADMIN — CARBIDOPA AND LEVODOPA 1 TABLET: 25; 100 TABLET ORAL at 08:50

## 2023-05-25 RX ADMIN — MAGNESIUM GLUCONATE 500 MG ORAL TABLET 400 MG: 500 TABLET ORAL at 08:50

## 2023-05-25 RX ADMIN — ENTACAPONE 200 MG: 200 TABLET, FILM COATED ORAL at 19:41

## 2023-05-25 RX ADMIN — ENTACAPONE 200 MG: 200 TABLET, FILM COATED ORAL at 08:50

## 2023-05-25 RX ADMIN — ISOSORBIDE MONONITRATE 30 MG: 30 TABLET, EXTENDED RELEASE ORAL at 08:50

## 2023-05-25 RX ADMIN — BENZOCAINE AND MENTHOL 1 LOZENGE: 15; 3.6 LOZENGE ORAL at 23:40

## 2023-05-25 RX ADMIN — ENOXAPARIN SODIUM 40 MG: 40 INJECTION SUBCUTANEOUS at 08:51

## 2023-05-25 RX ADMIN — CARBIDOPA AND LEVODOPA 1 TABLET: 25; 100 TABLET ORAL at 19:41

## 2023-05-25 RX ADMIN — ENTACAPONE 200 MG: 200 TABLET, FILM COATED ORAL at 16:57

## 2023-05-25 RX ADMIN — CARBIDOPA AND LEVODOPA 1 TABLET: 25; 100 TABLET ORAL at 13:20

## 2023-05-25 RX ADMIN — AMITRIPTYLINE HYDROCHLORIDE 10 MG: 10 TABLET, FILM COATED ORAL at 19:41

## 2023-05-25 RX ADMIN — PANTOPRAZOLE SODIUM 40 MG: 40 TABLET, DELAYED RELEASE ORAL at 16:57

## 2023-05-25 RX ADMIN — CARBIDOPA AND LEVODOPA 1 TABLET: 25; 100 TABLET ORAL at 16:57

## 2023-05-25 ASSESSMENT — PAIN - FUNCTIONAL ASSESSMENT: PAIN_FUNCTIONAL_ASSESSMENT: PREVENTS OR INTERFERES SOME ACTIVE ACTIVITIES AND ADLS

## 2023-05-25 ASSESSMENT — PAIN DESCRIPTION - ORIENTATION
ORIENTATION: LEFT
ORIENTATION: LEFT

## 2023-05-25 ASSESSMENT — PAIN DESCRIPTION - LOCATION
LOCATION: HIP
LOCATION: HIP

## 2023-05-25 ASSESSMENT — PAIN DESCRIPTION - DESCRIPTORS
DESCRIPTORS: ACHING;SORE
DESCRIPTORS: ACHING;DISCOMFORT

## 2023-05-25 ASSESSMENT — PAIN DESCRIPTION - PAIN TYPE
TYPE: ACUTE PAIN
TYPE: ACUTE PAIN

## 2023-05-25 ASSESSMENT — PAIN SCALES - GENERAL
PAINLEVEL_OUTOF10: 4
PAINLEVEL_OUTOF10: 3
PAINLEVEL_OUTOF10: 3
PAINLEVEL_OUTOF10: 4
PAINLEVEL_OUTOF10: 6

## 2023-05-25 ASSESSMENT — PAIN SCALES - WONG BAKER
WONGBAKER_NUMERICALRESPONSE: 0
WONGBAKER_NUMERICALRESPONSE: 0

## 2023-05-26 LAB
ANION GAP SERPL CALCULATED.3IONS-SCNC: 10 MMOL/L (ref 9–17)
BUN SERPL-MCNC: 15 MG/DL (ref 8–23)
CALCIUM SERPL-MCNC: 9.2 MG/DL (ref 8.6–10.4)
CHLORIDE SERPL-SCNC: 100 MMOL/L (ref 98–107)
CK SERPL-CCNC: 187 U/L (ref 39–308)
CO2 SERPL-SCNC: 23 MMOL/L (ref 20–31)
CREAT SERPL-MCNC: 1.35 MG/DL (ref 0.7–1.2)
GFR SERPL CREATININE-BSD FRML MDRD: 52 ML/MIN/1.73M2
GLUCOSE SERPL-MCNC: 186 MG/DL (ref 70–99)
POTASSIUM SERPL-SCNC: 4.4 MMOL/L (ref 3.7–5.3)
REASON FOR REJECTION: NORMAL
SODIUM SERPL-SCNC: 133 MMOL/L (ref 135–144)
SPECIMEN SOURCE: NORMAL
ZZ NTE CLEAN UP: ORDERED TEST: NORMAL

## 2023-05-26 PROCEDURE — 97110 THERAPEUTIC EXERCISES: CPT

## 2023-05-26 PROCEDURE — 1200000000 HC SEMI PRIVATE

## 2023-05-26 PROCEDURE — 6360000002 HC RX W HCPCS: Performed by: STUDENT IN AN ORGANIZED HEALTH CARE EDUCATION/TRAINING PROGRAM

## 2023-05-26 PROCEDURE — 97530 THERAPEUTIC ACTIVITIES: CPT

## 2023-05-26 PROCEDURE — 6370000000 HC RX 637 (ALT 250 FOR IP): Performed by: STUDENT IN AN ORGANIZED HEALTH CARE EDUCATION/TRAINING PROGRAM

## 2023-05-26 PROCEDURE — 80048 BASIC METABOLIC PNL TOTAL CA: CPT

## 2023-05-26 PROCEDURE — 82550 ASSAY OF CK (CPK): CPT

## 2023-05-26 PROCEDURE — 36415 COLL VENOUS BLD VENIPUNCTURE: CPT

## 2023-05-26 PROCEDURE — 2580000003 HC RX 258: Performed by: STUDENT IN AN ORGANIZED HEALTH CARE EDUCATION/TRAINING PROGRAM

## 2023-05-26 PROCEDURE — 6370000000 HC RX 637 (ALT 250 FOR IP)

## 2023-05-26 PROCEDURE — 97535 SELF CARE MNGMENT TRAINING: CPT

## 2023-05-26 RX ORDER — MAGNESIUM HYDROXIDE/ALUMINUM HYDROXICE/SIMETHICONE 120; 1200; 1200 MG/30ML; MG/30ML; MG/30ML
30 SUSPENSION ORAL EVERY 6 HOURS PRN
Status: DISCONTINUED | OUTPATIENT
Start: 2023-05-26 | End: 2023-05-31 | Stop reason: HOSPADM

## 2023-05-26 RX ADMIN — ACETAMINOPHEN 650 MG: 325 TABLET ORAL at 13:53

## 2023-05-26 RX ADMIN — BACLOFEN 5 MG: 5 TABLET ORAL at 08:41

## 2023-05-26 RX ADMIN — AMITRIPTYLINE HYDROCHLORIDE 10 MG: 10 TABLET, FILM COATED ORAL at 19:18

## 2023-05-26 RX ADMIN — BACLOFEN 5 MG: 5 TABLET ORAL at 13:53

## 2023-05-26 RX ADMIN — ENTACAPONE 200 MG: 200 TABLET, FILM COATED ORAL at 08:41

## 2023-05-26 RX ADMIN — TAMSULOSIN HYDROCHLORIDE 0.4 MG: 0.4 CAPSULE ORAL at 08:41

## 2023-05-26 RX ADMIN — CARBIDOPA AND LEVODOPA 1 TABLET: 25; 100 TABLET ORAL at 16:12

## 2023-05-26 RX ADMIN — ENTACAPONE 200 MG: 200 TABLET, FILM COATED ORAL at 19:18

## 2023-05-26 RX ADMIN — ACETAMINOPHEN 650 MG: 325 TABLET ORAL at 06:27

## 2023-05-26 RX ADMIN — ENTACAPONE 200 MG: 200 TABLET, FILM COATED ORAL at 16:12

## 2023-05-26 RX ADMIN — CARBIDOPA AND LEVODOPA 1 TABLET: 25; 100 TABLET ORAL at 13:53

## 2023-05-26 RX ADMIN — CARBIDOPA AND LEVODOPA 1 TABLET: 25; 100 TABLET ORAL at 19:18

## 2023-05-26 RX ADMIN — ENTACAPONE 200 MG: 200 TABLET, FILM COATED ORAL at 13:53

## 2023-05-26 RX ADMIN — CLOPIDOGREL BISULFATE 75 MG: 75 TABLET, FILM COATED ORAL at 08:42

## 2023-05-26 RX ADMIN — DESMOPRESSIN ACETATE 40 MG: 0.2 TABLET ORAL at 19:18

## 2023-05-26 RX ADMIN — SODIUM CHLORIDE, PRESERVATIVE FREE 10 ML: 5 INJECTION INTRAVENOUS at 19:18

## 2023-05-26 RX ADMIN — MAGNESIUM GLUCONATE 500 MG ORAL TABLET 400 MG: 500 TABLET ORAL at 08:41

## 2023-05-26 RX ADMIN — BACLOFEN 5 MG: 5 TABLET ORAL at 19:18

## 2023-05-26 RX ADMIN — PANTOPRAZOLE SODIUM 40 MG: 40 TABLET, DELAYED RELEASE ORAL at 16:12

## 2023-05-26 RX ADMIN — DICLOFENAC 2 G: 10 GEL TOPICAL at 06:28

## 2023-05-26 RX ADMIN — SODIUM CHLORIDE, PRESERVATIVE FREE 10 ML: 5 INJECTION INTRAVENOUS at 08:41

## 2023-05-26 RX ADMIN — CARBIDOPA AND LEVODOPA 1 TABLET: 25; 100 TABLET ORAL at 08:41

## 2023-05-26 RX ADMIN — ISOSORBIDE MONONITRATE 30 MG: 30 TABLET, EXTENDED RELEASE ORAL at 08:41

## 2023-05-26 RX ADMIN — ALUMINUM HYDROXIDE, MAGNESIUM HYDROXIDE, AND SIMETHICONE 30 ML: 200; 200; 20 SUSPENSION ORAL at 16:12

## 2023-05-26 RX ADMIN — ONDANSETRON 4 MG: 2 INJECTION INTRAMUSCULAR; INTRAVENOUS at 12:01

## 2023-05-26 RX ADMIN — PANTOPRAZOLE SODIUM 40 MG: 40 TABLET, DELAYED RELEASE ORAL at 05:04

## 2023-05-26 ASSESSMENT — PAIN DESCRIPTION - PAIN TYPE: TYPE: ACUTE PAIN

## 2023-05-26 ASSESSMENT — PAIN SCALES - GENERAL
PAINLEVEL_OUTOF10: 4
PAINLEVEL_OUTOF10: 5

## 2023-05-26 ASSESSMENT — PAIN DESCRIPTION - DESCRIPTORS: DESCRIPTORS: ACHING;DISCOMFORT

## 2023-05-26 ASSESSMENT — PAIN SCALES - WONG BAKER: WONGBAKER_NUMERICALRESPONSE: 0

## 2023-05-26 ASSESSMENT — PAIN DESCRIPTION - LOCATION
LOCATION: HIP
LOCATION: HIP

## 2023-05-26 ASSESSMENT — PAIN DESCRIPTION - ORIENTATION
ORIENTATION: LEFT
ORIENTATION: LEFT

## 2023-05-26 ASSESSMENT — PAIN - FUNCTIONAL ASSESSMENT: PAIN_FUNCTIONAL_ASSESSMENT: PREVENTS OR INTERFERES SOME ACTIVE ACTIVITIES AND ADLS

## 2023-05-27 LAB
ANION GAP SERPL CALCULATED.3IONS-SCNC: 10 MMOL/L (ref 9–17)
BUN SERPL-MCNC: 15 MG/DL (ref 8–23)
CALCIUM SERPL-MCNC: 9.4 MG/DL (ref 8.6–10.4)
CHLORIDE SERPL-SCNC: 100 MMOL/L (ref 98–107)
CO2 SERPL-SCNC: 25 MMOL/L (ref 20–31)
CREAT SERPL-MCNC: 1.3 MG/DL (ref 0.7–1.2)
GFR SERPL CREATININE-BSD FRML MDRD: 55 ML/MIN/1.73M2
GLUCOSE SERPL-MCNC: 126 MG/DL (ref 70–99)
POTASSIUM SERPL-SCNC: 4.2 MMOL/L (ref 3.7–5.3)
SODIUM SERPL-SCNC: 135 MMOL/L (ref 135–144)

## 2023-05-27 PROCEDURE — 80048 BASIC METABOLIC PNL TOTAL CA: CPT

## 2023-05-27 PROCEDURE — 36415 COLL VENOUS BLD VENIPUNCTURE: CPT

## 2023-05-27 PROCEDURE — 1200000000 HC SEMI PRIVATE

## 2023-05-27 PROCEDURE — 97530 THERAPEUTIC ACTIVITIES: CPT

## 2023-05-27 PROCEDURE — 2580000003 HC RX 258: Performed by: STUDENT IN AN ORGANIZED HEALTH CARE EDUCATION/TRAINING PROGRAM

## 2023-05-27 PROCEDURE — 6370000000 HC RX 637 (ALT 250 FOR IP): Performed by: STUDENT IN AN ORGANIZED HEALTH CARE EDUCATION/TRAINING PROGRAM

## 2023-05-27 PROCEDURE — 6360000002 HC RX W HCPCS: Performed by: STUDENT IN AN ORGANIZED HEALTH CARE EDUCATION/TRAINING PROGRAM

## 2023-05-27 PROCEDURE — 97116 GAIT TRAINING THERAPY: CPT

## 2023-05-27 RX ADMIN — AMITRIPTYLINE HYDROCHLORIDE 10 MG: 10 TABLET, FILM COATED ORAL at 20:44

## 2023-05-27 RX ADMIN — ACETAMINOPHEN 650 MG: 325 TABLET ORAL at 03:22

## 2023-05-27 RX ADMIN — ONDANSETRON 4 MG: 2 INJECTION INTRAMUSCULAR; INTRAVENOUS at 20:08

## 2023-05-27 RX ADMIN — CARBIDOPA AND LEVODOPA 1 TABLET: 25; 100 TABLET ORAL at 20:44

## 2023-05-27 RX ADMIN — BACLOFEN 5 MG: 5 TABLET ORAL at 09:13

## 2023-05-27 RX ADMIN — MAGNESIUM GLUCONATE 500 MG ORAL TABLET 400 MG: 500 TABLET ORAL at 09:13

## 2023-05-27 RX ADMIN — CARBIDOPA AND LEVODOPA 1 TABLET: 25; 100 TABLET ORAL at 17:01

## 2023-05-27 RX ADMIN — ACETAMINOPHEN 650 MG: 325 TABLET ORAL at 10:30

## 2023-05-27 RX ADMIN — SODIUM CHLORIDE, PRESERVATIVE FREE 10 ML: 5 INJECTION INTRAVENOUS at 20:09

## 2023-05-27 RX ADMIN — CARBIDOPA AND LEVODOPA 1 TABLET: 25; 100 TABLET ORAL at 12:51

## 2023-05-27 RX ADMIN — CARBIDOPA AND LEVODOPA 1 TABLET: 25; 100 TABLET ORAL at 09:13

## 2023-05-27 RX ADMIN — DESMOPRESSIN ACETATE 40 MG: 0.2 TABLET ORAL at 20:44

## 2023-05-27 RX ADMIN — ENTACAPONE 200 MG: 200 TABLET, FILM COATED ORAL at 12:51

## 2023-05-27 RX ADMIN — ENTACAPONE 200 MG: 200 TABLET, FILM COATED ORAL at 20:44

## 2023-05-27 RX ADMIN — DICLOFENAC 2 G: 10 GEL TOPICAL at 04:13

## 2023-05-27 RX ADMIN — TAMSULOSIN HYDROCHLORIDE 0.4 MG: 0.4 CAPSULE ORAL at 09:13

## 2023-05-27 RX ADMIN — BACLOFEN 5 MG: 5 TABLET ORAL at 12:51

## 2023-05-27 RX ADMIN — PANTOPRAZOLE SODIUM 40 MG: 40 TABLET, DELAYED RELEASE ORAL at 05:05

## 2023-05-27 RX ADMIN — SODIUM CHLORIDE, PRESERVATIVE FREE 10 ML: 5 INJECTION INTRAVENOUS at 09:13

## 2023-05-27 RX ADMIN — ENTACAPONE 200 MG: 200 TABLET, FILM COATED ORAL at 09:13

## 2023-05-27 RX ADMIN — ISOSORBIDE MONONITRATE 30 MG: 30 TABLET, EXTENDED RELEASE ORAL at 09:13

## 2023-05-27 RX ADMIN — ENOXAPARIN SODIUM 40 MG: 40 INJECTION SUBCUTANEOUS at 09:13

## 2023-05-27 RX ADMIN — ENTACAPONE 200 MG: 200 TABLET, FILM COATED ORAL at 17:01

## 2023-05-27 RX ADMIN — PANTOPRAZOLE SODIUM 40 MG: 40 TABLET, DELAYED RELEASE ORAL at 17:01

## 2023-05-27 RX ADMIN — BACLOFEN 5 MG: 5 TABLET ORAL at 20:44

## 2023-05-27 RX ADMIN — CLOPIDOGREL BISULFATE 75 MG: 75 TABLET, FILM COATED ORAL at 09:13

## 2023-05-28 PROCEDURE — 6360000002 HC RX W HCPCS: Performed by: STUDENT IN AN ORGANIZED HEALTH CARE EDUCATION/TRAINING PROGRAM

## 2023-05-28 PROCEDURE — 2580000003 HC RX 258: Performed by: STUDENT IN AN ORGANIZED HEALTH CARE EDUCATION/TRAINING PROGRAM

## 2023-05-28 PROCEDURE — 6370000000 HC RX 637 (ALT 250 FOR IP): Performed by: STUDENT IN AN ORGANIZED HEALTH CARE EDUCATION/TRAINING PROGRAM

## 2023-05-28 PROCEDURE — 1200000000 HC SEMI PRIVATE

## 2023-05-28 PROCEDURE — 6370000000 HC RX 637 (ALT 250 FOR IP)

## 2023-05-28 RX ADMIN — CARBIDOPA AND LEVODOPA 1 TABLET: 25; 100 TABLET ORAL at 20:40

## 2023-05-28 RX ADMIN — ALUMINUM HYDROXIDE, MAGNESIUM HYDROXIDE, AND SIMETHICONE 30 ML: 200; 200; 20 SUSPENSION ORAL at 20:10

## 2023-05-28 RX ADMIN — ACETAMINOPHEN 650 MG: 325 TABLET ORAL at 16:19

## 2023-05-28 RX ADMIN — SODIUM CHLORIDE, PRESERVATIVE FREE 10 ML: 5 INJECTION INTRAVENOUS at 20:40

## 2023-05-28 RX ADMIN — PANTOPRAZOLE SODIUM 40 MG: 40 TABLET, DELAYED RELEASE ORAL at 16:19

## 2023-05-28 RX ADMIN — PANTOPRAZOLE SODIUM 40 MG: 40 TABLET, DELAYED RELEASE ORAL at 06:36

## 2023-05-28 RX ADMIN — TAMSULOSIN HYDROCHLORIDE 0.4 MG: 0.4 CAPSULE ORAL at 10:49

## 2023-05-28 RX ADMIN — ENOXAPARIN SODIUM 40 MG: 40 INJECTION SUBCUTANEOUS at 10:51

## 2023-05-28 RX ADMIN — ENTACAPONE 200 MG: 200 TABLET, FILM COATED ORAL at 10:50

## 2023-05-28 RX ADMIN — MAGNESIUM GLUCONATE 500 MG ORAL TABLET 400 MG: 500 TABLET ORAL at 10:49

## 2023-05-28 RX ADMIN — CARBIDOPA AND LEVODOPA 1 TABLET: 25; 100 TABLET ORAL at 16:19

## 2023-05-28 RX ADMIN — AMITRIPTYLINE HYDROCHLORIDE 10 MG: 10 TABLET, FILM COATED ORAL at 20:40

## 2023-05-28 RX ADMIN — ENTACAPONE 200 MG: 200 TABLET, FILM COATED ORAL at 20:40

## 2023-05-28 RX ADMIN — ISOSORBIDE MONONITRATE 30 MG: 30 TABLET, EXTENDED RELEASE ORAL at 10:49

## 2023-05-28 RX ADMIN — ENTACAPONE 200 MG: 200 TABLET, FILM COATED ORAL at 16:19

## 2023-05-28 RX ADMIN — ONDANSETRON 4 MG: 2 INJECTION INTRAMUSCULAR; INTRAVENOUS at 17:24

## 2023-05-28 RX ADMIN — CARBIDOPA AND LEVODOPA 1 TABLET: 25; 100 TABLET ORAL at 10:49

## 2023-05-28 RX ADMIN — BACLOFEN 5 MG: 5 TABLET ORAL at 20:40

## 2023-05-28 RX ADMIN — DESMOPRESSIN ACETATE 40 MG: 0.2 TABLET ORAL at 20:40

## 2023-05-28 RX ADMIN — BACLOFEN 5 MG: 5 TABLET ORAL at 10:49

## 2023-05-28 RX ADMIN — ALUMINUM HYDROXIDE, MAGNESIUM HYDROXIDE, AND SIMETHICONE 30 ML: 200; 200; 20 SUSPENSION ORAL at 14:02

## 2023-05-28 RX ADMIN — CLOPIDOGREL BISULFATE 75 MG: 75 TABLET, FILM COATED ORAL at 10:49

## 2023-05-29 LAB
ANION GAP SERPL CALCULATED.3IONS-SCNC: 9 MMOL/L (ref 9–17)
BASOPHILS # BLD: 0.03 K/UL (ref 0–0.2)
BASOPHILS NFR BLD: 1 % (ref 0–2)
BUN SERPL-MCNC: 16 MG/DL (ref 8–23)
CALCIUM SERPL-MCNC: 9.4 MG/DL (ref 8.6–10.4)
CHLORIDE SERPL-SCNC: 103 MMOL/L (ref 98–107)
CO2 SERPL-SCNC: 22 MMOL/L (ref 20–31)
CREAT SERPL-MCNC: 1.4 MG/DL (ref 0.7–1.2)
EOSINOPHIL # BLD: 0.24 K/UL (ref 0–0.44)
EOSINOPHILS RELATIVE PERCENT: 4 % (ref 1–4)
ERYTHROCYTE [DISTWIDTH] IN BLOOD BY AUTOMATED COUNT: 13.5 % (ref 11.8–14.4)
GFR SERPL CREATININE-BSD FRML MDRD: 50 ML/MIN/1.73M2
GLUCOSE SERPL-MCNC: 112 MG/DL (ref 70–99)
HCT VFR BLD AUTO: 42.8 % (ref 40.7–50.3)
HGB BLD-MCNC: 14.5 G/DL (ref 13–17)
IMM GRANULOCYTES # BLD AUTO: <0.03 K/UL (ref 0–0.3)
IMM GRANULOCYTES NFR BLD: 0 %
LYMPHOCYTES # BLD: 25 % (ref 24–43)
LYMPHOCYTES NFR BLD: 1.53 K/UL (ref 1.1–3.7)
MCH RBC QN AUTO: 31.9 PG (ref 25.2–33.5)
MCHC RBC AUTO-ENTMCNC: 33.9 G/DL (ref 28.4–34.8)
MCV RBC AUTO: 94.3 FL (ref 82.6–102.9)
MONOCYTES NFR BLD: 0.7 K/UL (ref 0.1–1.2)
MONOCYTES NFR BLD: 12 % (ref 3–12)
NEUTROPHILS NFR BLD: 58 % (ref 36–65)
NEUTS SEG NFR BLD: 3.56 K/UL (ref 1.5–8.1)
NRBC AUTOMATED: 0 PER 100 WBC
PLATELET # BLD AUTO: 207 K/UL (ref 138–453)
PMV BLD AUTO: 8.7 FL (ref 8.1–13.5)
POTASSIUM SERPL-SCNC: 4.2 MMOL/L (ref 3.7–5.3)
RBC # BLD AUTO: 4.54 M/UL (ref 4.21–5.77)
SODIUM SERPL-SCNC: 134 MMOL/L (ref 135–144)
WBC OTHER # BLD: 6.1 K/UL (ref 3.5–11.3)

## 2023-05-29 PROCEDURE — 85025 COMPLETE CBC W/AUTO DIFF WBC: CPT

## 2023-05-29 PROCEDURE — 6370000000 HC RX 637 (ALT 250 FOR IP): Performed by: STUDENT IN AN ORGANIZED HEALTH CARE EDUCATION/TRAINING PROGRAM

## 2023-05-29 PROCEDURE — 6370000000 HC RX 637 (ALT 250 FOR IP): Performed by: NURSE PRACTITIONER

## 2023-05-29 PROCEDURE — 80048 BASIC METABOLIC PNL TOTAL CA: CPT

## 2023-05-29 PROCEDURE — 36415 COLL VENOUS BLD VENIPUNCTURE: CPT

## 2023-05-29 PROCEDURE — 6360000002 HC RX W HCPCS: Performed by: STUDENT IN AN ORGANIZED HEALTH CARE EDUCATION/TRAINING PROGRAM

## 2023-05-29 PROCEDURE — 1200000000 HC SEMI PRIVATE

## 2023-05-29 PROCEDURE — 2580000003 HC RX 258: Performed by: STUDENT IN AN ORGANIZED HEALTH CARE EDUCATION/TRAINING PROGRAM

## 2023-05-29 PROCEDURE — 6370000000 HC RX 637 (ALT 250 FOR IP)

## 2023-05-29 RX ADMIN — ONDANSETRON 4 MG: 2 INJECTION INTRAMUSCULAR; INTRAVENOUS at 01:53

## 2023-05-29 RX ADMIN — Medication 5 MG: at 21:26

## 2023-05-29 RX ADMIN — CARBIDOPA AND LEVODOPA 1 TABLET: 25; 100 TABLET ORAL at 17:05

## 2023-05-29 RX ADMIN — TAMSULOSIN HYDROCHLORIDE 0.4 MG: 0.4 CAPSULE ORAL at 10:23

## 2023-05-29 RX ADMIN — ENTACAPONE 200 MG: 200 TABLET, FILM COATED ORAL at 14:01

## 2023-05-29 RX ADMIN — Medication 5 MG: at 04:43

## 2023-05-29 RX ADMIN — SODIUM CHLORIDE, PRESERVATIVE FREE 10 ML: 5 INJECTION INTRAVENOUS at 21:21

## 2023-05-29 RX ADMIN — CARBIDOPA AND LEVODOPA 1 TABLET: 25; 100 TABLET ORAL at 21:23

## 2023-05-29 RX ADMIN — ALUMINUM HYDROXIDE, MAGNESIUM HYDROXIDE, AND SIMETHICONE 30 ML: 200; 200; 20 SUSPENSION ORAL at 06:13

## 2023-05-29 RX ADMIN — CARBIDOPA AND LEVODOPA 1 TABLET: 25; 100 TABLET ORAL at 14:01

## 2023-05-29 RX ADMIN — BACLOFEN 5 MG: 5 TABLET ORAL at 21:23

## 2023-05-29 RX ADMIN — MAGNESIUM GLUCONATE 500 MG ORAL TABLET 400 MG: 500 TABLET ORAL at 10:19

## 2023-05-29 RX ADMIN — CLOPIDOGREL BISULFATE 75 MG: 75 TABLET, FILM COATED ORAL at 10:19

## 2023-05-29 RX ADMIN — ONDANSETRON 4 MG: 2 INJECTION INTRAMUSCULAR; INTRAVENOUS at 21:21

## 2023-05-29 RX ADMIN — ENTACAPONE 200 MG: 200 TABLET, FILM COATED ORAL at 17:05

## 2023-05-29 RX ADMIN — ENTACAPONE 200 MG: 200 TABLET, FILM COATED ORAL at 10:20

## 2023-05-29 RX ADMIN — ACETAMINOPHEN 650 MG: 325 TABLET ORAL at 01:47

## 2023-05-29 RX ADMIN — SODIUM CHLORIDE, PRESERVATIVE FREE 10 ML: 5 INJECTION INTRAVENOUS at 10:25

## 2023-05-29 RX ADMIN — BENZOCAINE AND MENTHOL 1 LOZENGE: 15; 3.6 LOZENGE ORAL at 21:29

## 2023-05-29 RX ADMIN — PANTOPRAZOLE SODIUM 40 MG: 40 TABLET, DELAYED RELEASE ORAL at 06:13

## 2023-05-29 RX ADMIN — ACETAMINOPHEN 650 MG: 325 TABLET ORAL at 19:32

## 2023-05-29 RX ADMIN — AMITRIPTYLINE HYDROCHLORIDE 10 MG: 10 TABLET, FILM COATED ORAL at 21:23

## 2023-05-29 RX ADMIN — ACETAMINOPHEN 650 MG: 325 TABLET ORAL at 10:18

## 2023-05-29 RX ADMIN — ISOSORBIDE MONONITRATE 30 MG: 30 TABLET, EXTENDED RELEASE ORAL at 10:19

## 2023-05-29 RX ADMIN — BACLOFEN 5 MG: 5 TABLET ORAL at 14:01

## 2023-05-29 RX ADMIN — PANTOPRAZOLE SODIUM 40 MG: 40 TABLET, DELAYED RELEASE ORAL at 17:05

## 2023-05-29 RX ADMIN — ENOXAPARIN SODIUM 40 MG: 40 INJECTION SUBCUTANEOUS at 10:20

## 2023-05-29 RX ADMIN — BACLOFEN 5 MG: 5 TABLET ORAL at 10:19

## 2023-05-29 RX ADMIN — DESMOPRESSIN ACETATE 40 MG: 0.2 TABLET ORAL at 21:23

## 2023-05-29 RX ADMIN — ENTACAPONE 200 MG: 200 TABLET, FILM COATED ORAL at 21:23

## 2023-05-29 RX ADMIN — ALUMINUM HYDROXIDE, MAGNESIUM HYDROXIDE, AND SIMETHICONE 30 ML: 200; 200; 20 SUSPENSION ORAL at 14:03

## 2023-05-29 RX ADMIN — CARBIDOPA AND LEVODOPA 1 TABLET: 25; 100 TABLET ORAL at 10:19

## 2023-05-29 ASSESSMENT — PAIN DESCRIPTION - LOCATION: LOCATION: HEAD

## 2023-05-29 ASSESSMENT — PAIN SCALES - GENERAL: PAINLEVEL_OUTOF10: 4

## 2023-05-29 NOTE — PLAN OF CARE
Problem: Chronic Conditions and Co-morbidities  Goal: Patient's chronic conditions and co-morbidity symptoms are monitored and maintained or improved  Outcome: Progressing     Problem: Pain  Goal: Verbalizes/displays adequate comfort level or baseline comfort level  Outcome: Progressing     Problem: Safety - Adult  Goal: Free from fall injury  Outcome: Progressing     Problem: Discharge Planning  Goal: Discharge to home or other facility with appropriate resources  Outcome: Progressing     Problem: ABCDS Injury Assessment  Goal: Absence of physical injury  Outcome: Progressing     Problem: Nutrition Deficit:  Goal: Optimize nutritional status  Outcome: Progressing

## 2023-05-29 NOTE — PROGRESS NOTES
[facility]  CDU / OBSERVATION ENCOUNTER  ATTENDING NOTE       I performed a history and physical examination of the patient and discussed management with the resident or midlevel provider. I reviewed the resident or midlevel provider's note and agree with the documented findings and plan of care. Any areas of disagreement are noted on the chart. I was personally present for the key portions of any procedures. I have documented in the chart those procedures where I was not present during the key portions. I have reviewed the nurses notes. I agree with the chief complaint, past medical history, past surgical history, allergies, medications, social and family history as documented unless otherwise noted below. The Family history, social history, and ROS are effectively unchanged since admission unless noted elsewhere in the chart. This patient was placed in the observation unit for reevaluation for possible admission to the hospital    The patient is a 79year old male who is awaiting placement. No acute changes overnight. He continues to complain of GERD symptoms but these are improving.      [address], [address] [facility],[address]  Attending Emergency  Physician

## 2023-05-29 NOTE — CARE COORDINATION
Transitional planning: Phone call to Reece at [address] [place] [address] to inquire about status of appeal. They have not heard anything.  (We started appeal 5/19)

## 2023-05-29 NOTE — PROGRESS NOTES
OBS/CDU   Attending NOTE      Patients PCP is:  JW Thomas - FELIPE        SUBJECTIVE      Patient seen and evaluated bedside. Patient had episode of mild abdominal pain last night again. States that he is having some trouble sleeping in the hospital. States he is doing \"great\", denies any complaints. Pending F insurance appeal    PHYSICAL EXAM      General: NAD, AO X 3  Heent: EMOI, PERRL  Neck: SUPPLE, NO JVD  Cardiovascular: RRR, S1S2  Pulmonary: CTAB, NO SOB  Abdomen: SOFT, NTTP, ND, +BS  Extremities: +2/4 PULSES DISTAL, NO SWELLING  Neuro / Psych: NO NUMBNESS OR TINGLING, MENTATION AT BASELINE    PERTINENT TEST /EXAMS      I have reviewed all available laboratory results.     MEDICATIONS CURRENT   aluminum & magnesium hydroxide-simethicone (MAALOX) 200-200-20 MG/5ML suspension 30 mL, Q6H PRN  benzocaine-menthol (CEPACOL SORE THROAT) lozenge 1 lozenge, Q2H PRN  amitriptyline (ELAVIL) tablet 10 mg, Nightly  atorvastatin (LIPITOR) tablet 40 mg, Nightly  butalbital-acetaminophen-caffeine (FIORICET, ESGIC) per tablet 1 tablet, Q6H PRN  carbidopa-levodopa (SINEMET)  MG per tablet 1 tablet, 4x Daily  clopidogrel (PLAVIX) tablet 75 mg, Daily  diclofenac sodium (VOLTAREN) 1 % gel 2 g, BID PRN  entacapone (COMTAN) tablet 200 mg, 4x Daily  isosorbide mononitrate (IMDUR) extended release tablet 30 mg, Daily  magnesium oxide (MAG-OX) tablet 400 mg, Daily  pantoprazole (PROTONIX) tablet 40 mg, BID AC  tamsulosin (FLOMAX) capsule 0.4 mg, Daily  sodium chloride flush 0.9 % injection 5-40 mL, 2 times per day  sodium chloride flush 0.9 % injection 5-40 mL, PRN  0.9 % sodium chloride infusion, PRN  potassium chloride (KLOR-CON M) extended release tablet 40 mEq, PRN   Or  potassium bicarb-citric acid (EFFER-K) effervescent tablet 40 mEq, PRN   Or  potassium chloride 10 mEq/100 mL IVPB (Peripheral Line), PRN  enoxaparin (LOVENOX) injection 40 mg, Daily  ondansetron (ZOFRAN) injection 4 mg, Q4H PRN  polyethylene glycol

## 2023-05-30 LAB
ANION GAP SERPL CALCULATED.3IONS-SCNC: 10 MMOL/L (ref 9–17)
BUN SERPL-MCNC: 17 MG/DL (ref 8–23)
CALCIUM SERPL-MCNC: 9.2 MG/DL (ref 8.6–10.4)
CHLORIDE SERPL-SCNC: 101 MMOL/L (ref 98–107)
CO2 SERPL-SCNC: 23 MMOL/L (ref 20–31)
CREAT SERPL-MCNC: 1.53 MG/DL (ref 0.7–1.2)
GFR SERPL CREATININE-BSD FRML MDRD: 45 ML/MIN/1.73M2
GLUCOSE SERPL-MCNC: 112 MG/DL (ref 70–99)
POTASSIUM SERPL-SCNC: 4.4 MMOL/L (ref 3.7–5.3)
SODIUM SERPL-SCNC: 134 MMOL/L (ref 135–144)

## 2023-05-30 PROCEDURE — 1200000000 HC SEMI PRIVATE

## 2023-05-30 PROCEDURE — 6370000000 HC RX 637 (ALT 250 FOR IP)

## 2023-05-30 PROCEDURE — 36415 COLL VENOUS BLD VENIPUNCTURE: CPT

## 2023-05-30 PROCEDURE — 97535 SELF CARE MNGMENT TRAINING: CPT

## 2023-05-30 PROCEDURE — 6370000000 HC RX 637 (ALT 250 FOR IP): Performed by: NURSE PRACTITIONER

## 2023-05-30 PROCEDURE — 80048 BASIC METABOLIC PNL TOTAL CA: CPT

## 2023-05-30 PROCEDURE — 6370000000 HC RX 637 (ALT 250 FOR IP): Performed by: STUDENT IN AN ORGANIZED HEALTH CARE EDUCATION/TRAINING PROGRAM

## 2023-05-30 PROCEDURE — 2580000003 HC RX 258: Performed by: STUDENT IN AN ORGANIZED HEALTH CARE EDUCATION/TRAINING PROGRAM

## 2023-05-30 PROCEDURE — 6360000002 HC RX W HCPCS: Performed by: STUDENT IN AN ORGANIZED HEALTH CARE EDUCATION/TRAINING PROGRAM

## 2023-05-30 RX ORDER — LIDOCAINE HYDROCHLORIDE 20 MG/ML
15 SOLUTION OROPHARYNGEAL
Status: DISCONTINUED | OUTPATIENT
Start: 2023-05-30 | End: 2023-05-31 | Stop reason: HOSPADM

## 2023-05-30 RX ADMIN — CARBIDOPA AND LEVODOPA 1 TABLET: 25; 100 TABLET ORAL at 21:51

## 2023-05-30 RX ADMIN — BACLOFEN 5 MG: 5 TABLET ORAL at 21:51

## 2023-05-30 RX ADMIN — CARBIDOPA AND LEVODOPA 1 TABLET: 25; 100 TABLET ORAL at 13:32

## 2023-05-30 RX ADMIN — MAGNESIUM GLUCONATE 500 MG ORAL TABLET 400 MG: 500 TABLET ORAL at 09:53

## 2023-05-30 RX ADMIN — SODIUM CHLORIDE, PRESERVATIVE FREE 10 ML: 5 INJECTION INTRAVENOUS at 21:52

## 2023-05-30 RX ADMIN — BACLOFEN 5 MG: 5 TABLET ORAL at 13:31

## 2023-05-30 RX ADMIN — DESMOPRESSIN ACETATE 40 MG: 0.2 TABLET ORAL at 21:51

## 2023-05-30 RX ADMIN — DICLOFENAC 2 G: 10 GEL TOPICAL at 16:43

## 2023-05-30 RX ADMIN — CLOPIDOGREL BISULFATE 75 MG: 75 TABLET, FILM COATED ORAL at 09:54

## 2023-05-30 RX ADMIN — PANTOPRAZOLE SODIUM 40 MG: 40 TABLET, DELAYED RELEASE ORAL at 05:58

## 2023-05-30 RX ADMIN — ALUMINUM HYDROXIDE, MAGNESIUM HYDROXIDE, AND SIMETHICONE 30 ML: 200; 200; 20 SUSPENSION ORAL at 09:48

## 2023-05-30 RX ADMIN — ENTACAPONE 200 MG: 200 TABLET, FILM COATED ORAL at 21:51

## 2023-05-30 RX ADMIN — BACLOFEN 5 MG: 5 TABLET ORAL at 09:54

## 2023-05-30 RX ADMIN — LIDOCAINE HYDROCHLORIDE 15 ML: 20 SOLUTION ORAL; TOPICAL at 10:56

## 2023-05-30 RX ADMIN — PANTOPRAZOLE SODIUM 40 MG: 40 TABLET, DELAYED RELEASE ORAL at 16:42

## 2023-05-30 RX ADMIN — TAMSULOSIN HYDROCHLORIDE 0.4 MG: 0.4 CAPSULE ORAL at 09:53

## 2023-05-30 RX ADMIN — SODIUM CHLORIDE, PRESERVATIVE FREE 10 ML: 5 INJECTION INTRAVENOUS at 09:57

## 2023-05-30 RX ADMIN — AMITRIPTYLINE HYDROCHLORIDE 10 MG: 10 TABLET, FILM COATED ORAL at 21:51

## 2023-05-30 RX ADMIN — CARBIDOPA AND LEVODOPA 1 TABLET: 25; 100 TABLET ORAL at 16:42

## 2023-05-30 RX ADMIN — ENTACAPONE 200 MG: 200 TABLET, FILM COATED ORAL at 13:32

## 2023-05-30 RX ADMIN — ISOSORBIDE MONONITRATE 30 MG: 30 TABLET, EXTENDED RELEASE ORAL at 09:53

## 2023-05-30 RX ADMIN — DICLOFENAC 2 G: 10 GEL TOPICAL at 21:51

## 2023-05-30 RX ADMIN — ENTACAPONE 200 MG: 200 TABLET, FILM COATED ORAL at 16:42

## 2023-05-30 RX ADMIN — CARBIDOPA AND LEVODOPA 1 TABLET: 25; 100 TABLET ORAL at 09:53

## 2023-05-30 RX ADMIN — ENTACAPONE 200 MG: 200 TABLET, FILM COATED ORAL at 09:54

## 2023-05-30 RX ADMIN — LIDOCAINE HYDROCHLORIDE 15 ML: 20 SOLUTION ORAL; TOPICAL at 16:45

## 2023-05-30 RX ADMIN — Medication 5 MG: at 21:53

## 2023-05-30 RX ADMIN — ENOXAPARIN SODIUM 40 MG: 40 INJECTION SUBCUTANEOUS at 09:56

## 2023-05-30 ASSESSMENT — PAIN SCALES - GENERAL
PAINLEVEL_OUTOF10: 4
PAINLEVEL_OUTOF10: 4

## 2023-05-30 NOTE — PROGRESS NOTES
Physical Therapy        Physical Therapy Cancel Note      DATE: 2023    NAME: Jakob Roland  MRN: [id number]   : 1940      Patient not seen this date for Physical Therapy due to:    Patient Declined: Pt stated \"not feeling well. \" Will check back this afternoon.        Electronically signed by Radha Jerry PTA on 2023 at 11:36 AM

## 2023-05-30 NOTE — CARE COORDINATION
Transition planning  Called and spoke with Ani at [facility] re: appeal decision, she states she has not heard anything re: this. She will e-mail [employer] and update CM.

## 2023-05-30 NOTE — PROGRESS NOTES
Physical Therapy        Physical Therapy Cancel Note      DATE: 2023    NAME: Ute Sagastume  MRN: [id number]   : 1940      Patient not seen this date for Physical Therapy due to:    Patient Declined: Pt still not feeling well, this is second attempt of the day, will try again in the morning of  .       Electronically signed by Lois Cummins PTA on 2023 at 2:44 PM

## 2023-05-30 NOTE — PROGRESS NOTES
Occupational Therapy  Facility/Department: [address] ORTHO/MED SURG  Occupational Therapy Daily Treatment Note    Name: Enmanuel Palomo  : 1940  MRN: [id number]  Date of Service: 2023    Discharge Recommendations:  Patient would benefit from continued therapy after discharge          Patient Diagnosis(es): The primary encounter diagnosis was Left hip pain. A diagnosis of Unable to ambulate was also pertinent to this visit. 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 Past Surgical History:  has a past surgical history that includes Colonoscopy (2012); shoulder surgery (Right); pr colsc flx w/rmvl of tumor polyp lesion snare tq (N/A, 2017); Colonoscopy (2017); Cholecystectomy, laparoscopic (N/A, 10/29/2022); Esophagogastroduodenoscopy (2023); and Upper gastrointestinal endoscopy (N/A, 2023). Assessment   Performance deficits / Impairments: Decreased functional mobility ; Decreased ADL status; Decreased ROM; Decreased safe awareness;Decreased cognition;Decreased endurance;Decreased balance;Decreased high-level IADLs;Decreased coordination  Assessment: Pt will require continued OT services to address deficits in ROM, strength, cognition, endurance, balance, and coordination through use of skilled therapeutic interventions to promote functional independence with ADLs/IADLs and functional transfers/mobility.   Prognosis: Good  Activity

## 2023-05-30 NOTE — PROGRESS NOTES
OBS/CDU   Attending NOTE      Patients PCP is:  JW Claire - FELIPE        SUBJECTIVE      Patient seen and evaluated bedside. No acute overnight events . No complaints today, states he slept well. Has not yet eaten breakfast.     PHYSICAL EXAM      General: NAD, AO X 3  Heent: Normocephalic, atraumatic   Cardiovascular: RRR, S1S2  Pulmonary: CTAB, NO SOB  Abdomen: SOFT, NTTP, ND  Extremities: +2/4 PULSES DISTAL, NO SWELLING  Neuro / Psych: NO NUMBNESS OR TINGLING, MENTATION AT BASELINE    PERTINENT TEST /EXAMS      I have reviewed all available laboratory results.     MEDICATIONS CURRENT   aluminum & magnesium hydroxide-simethicone (MAALOX) 200-200-20 MG/5ML suspension 30 mL, Q6H PRN  benzocaine-menthol (CEPACOL SORE THROAT) lozenge 1 lozenge, Q2H PRN  amitriptyline (ELAVIL) tablet 10 mg, Nightly  atorvastatin (LIPITOR) tablet 40 mg, Nightly  butalbital-acetaminophen-caffeine (FIORICET, ESGIC) per tablet 1 tablet, Q6H PRN  carbidopa-levodopa (SINEMET)  MG per tablet 1 tablet, 4x Daily  clopidogrel (PLAVIX) tablet 75 mg, Daily  diclofenac sodium (VOLTAREN) 1 % gel 2 g, BID PRN  entacapone (COMTAN) tablet 200 mg, 4x Daily  isosorbide mononitrate (IMDUR) extended release tablet 30 mg, Daily  magnesium oxide (MAG-OX) tablet 400 mg, Daily  pantoprazole (PROTONIX) tablet 40 mg, BID AC  tamsulosin (FLOMAX) capsule 0.4 mg, Daily  sodium chloride flush 0.9 % injection 5-40 mL, 2 times per day  sodium chloride flush 0.9 % injection 5-40 mL, PRN  0.9 % sodium chloride infusion, PRN  potassium chloride (KLOR-CON M) extended release tablet 40 mEq, PRN   Or  potassium bicarb-citric acid (EFFER-K) effervescent tablet 40 mEq, PRN   Or  potassium chloride 10 mEq/100 mL IVPB (Peripheral Line), PRN  enoxaparin (LOVENOX) injection 40 mg, Daily  ondansetron (ZOFRAN) injection 4 mg, Q4H PRN  polyethylene glycol (GLYCOLAX) packet 17 g, Daily PRN  acetaminophen (TYLENOL) tablet 650 mg, Q6H PRN   Or  acetaminophen (TYLENOL)

## 2023-05-30 NOTE — PROGRESS NOTES
[facility]  CDU / OBSERVATION ENCOUNTER  ATTENDING NOTE       I performed a history and physical examination of the patient and discussed management with the resident or midlevel provider. I reviewed the resident or midlevel provider's note and agree with the documented findings and plan of care. Any areas of disagreement are noted on the chart. I was personally present for the key portions of any procedures. I have documented in the chart those procedures where I was not present during the key portions. I have reviewed the nurses notes. I agree with the chief complaint, past medical history, past surgical history, allergies, medications, social and family history as documented unless otherwise noted below. The Family history, social history, and ROS are effectively unchanged since admission unless noted elsewhere in the chart. This patient was placed in the observation unit for reevaluation for possible admission to the hospital    The patient is a 79year old male who is awaiting placement. No acute changes overnight. He continues to complain of GERD symptoms but when I walk in his room this morning he is eating jauregui and drinking orange juice. I recommended that he adjust his diet and Maalox was ordered. Case management is still waiting to hear back about placement.      [address], [address] [facility],[address]  Attending Emergency  Physician

## 2023-05-31 VITALS
SYSTOLIC BLOOD PRESSURE: 109 MMHG | RESPIRATION RATE: 16 BRPM | HEART RATE: 64 BPM | DIASTOLIC BLOOD PRESSURE: 53 MMHG | TEMPERATURE: 98.1 F | OXYGEN SATURATION: 96 % | HEIGHT: 67 IN | WEIGHT: 179.23 LBS | BODY MASS INDEX: 28.13 KG/M2

## 2023-05-31 PROCEDURE — 2580000003 HC RX 258: Performed by: STUDENT IN AN ORGANIZED HEALTH CARE EDUCATION/TRAINING PROGRAM

## 2023-05-31 PROCEDURE — 6360000002 HC RX W HCPCS: Performed by: STUDENT IN AN ORGANIZED HEALTH CARE EDUCATION/TRAINING PROGRAM

## 2023-05-31 PROCEDURE — 6370000000 HC RX 637 (ALT 250 FOR IP)

## 2023-05-31 PROCEDURE — 6370000000 HC RX 637 (ALT 250 FOR IP): Performed by: STUDENT IN AN ORGANIZED HEALTH CARE EDUCATION/TRAINING PROGRAM

## 2023-05-31 RX ADMIN — CLOPIDOGREL BISULFATE 75 MG: 75 TABLET, FILM COATED ORAL at 08:52

## 2023-05-31 RX ADMIN — LIDOCAINE HYDROCHLORIDE 15 ML: 20 SOLUTION ORAL; TOPICAL at 13:27

## 2023-05-31 RX ADMIN — ENTACAPONE 200 MG: 200 TABLET, FILM COATED ORAL at 12:37

## 2023-05-31 RX ADMIN — PANTOPRAZOLE SODIUM 40 MG: 40 TABLET, DELAYED RELEASE ORAL at 06:09

## 2023-05-31 RX ADMIN — MAGNESIUM GLUCONATE 500 MG ORAL TABLET 400 MG: 500 TABLET ORAL at 08:52

## 2023-05-31 RX ADMIN — ENOXAPARIN SODIUM 40 MG: 40 INJECTION SUBCUTANEOUS at 08:56

## 2023-05-31 RX ADMIN — CARBIDOPA AND LEVODOPA 1 TABLET: 25; 100 TABLET ORAL at 08:52

## 2023-05-31 RX ADMIN — ONDANSETRON 4 MG: 2 INJECTION INTRAMUSCULAR; INTRAVENOUS at 10:38

## 2023-05-31 RX ADMIN — SODIUM CHLORIDE, PRESERVATIVE FREE 10 ML: 5 INJECTION INTRAVENOUS at 09:49

## 2023-05-31 RX ADMIN — TAMSULOSIN HYDROCHLORIDE 0.4 MG: 0.4 CAPSULE ORAL at 08:52

## 2023-05-31 RX ADMIN — BACLOFEN 5 MG: 5 TABLET ORAL at 12:37

## 2023-05-31 RX ADMIN — CARBIDOPA AND LEVODOPA 1 TABLET: 25; 100 TABLET ORAL at 12:37

## 2023-05-31 RX ADMIN — ISOSORBIDE MONONITRATE 30 MG: 30 TABLET, EXTENDED RELEASE ORAL at 08:52

## 2023-05-31 RX ADMIN — ENTACAPONE 200 MG: 200 TABLET, FILM COATED ORAL at 08:52

## 2023-05-31 RX ADMIN — BACLOFEN 5 MG: 5 TABLET ORAL at 08:52

## 2023-05-31 ASSESSMENT — PAIN SCALES - WONG BAKER: WONGBAKER_NUMERICALRESPONSE: 0

## 2023-05-31 ASSESSMENT — PAIN SCALES - GENERAL: PAINLEVEL_OUTOF10: 3

## 2023-05-31 NOTE — PROGRESS NOTES
OBS/CDU   Attending NOTE      Patients PCP is:  JW Westbrook - FELIPE        SUBJECTIVE      No new complaint today. Patient appears well. Patient is ready for x-rays of care. Patient states feeling better today than yesterday. Patient by nursing report is nonambulatory with some frequency. Patient enjoying his excursions out of bed. PHYSICAL EXAM      General: NAD, AO X 3  Heent: EMOI, PERRL  Neck: SUPPLE, NO JVD  Cardiovascular: RRR, S1S2  Pulmonary: CTAB, NO SOB  Abdomen: SOFT, NTTP, ND, +BS  Extremities: +2/4 PULSES DISTAL, NO SWELLING  Neuro / Psych: NO NUMBNESS OR TINGLING, MENTATION AT BASELINE    PERTINENT TEST /EXAMS      I have reviewed all available laboratory results.     MEDICATIONS CURRENT   lidocaine viscous hcl (XYLOCAINE) 2 % solution 15 mL, Q3H PRN  aluminum & magnesium hydroxide-simethicone (MAALOX) 200-200-20 MG/5ML suspension 30 mL, Q6H PRN  benzocaine-menthol (CEPACOL SORE THROAT) lozenge 1 lozenge, Q2H PRN  amitriptyline (ELAVIL) tablet 10 mg, Nightly  atorvastatin (LIPITOR) tablet 40 mg, Nightly  butalbital-acetaminophen-caffeine (FIORICET, ESGIC) per tablet 1 tablet, Q6H PRN  carbidopa-levodopa (SINEMET)  MG per tablet 1 tablet, 4x Daily  clopidogrel (PLAVIX) tablet 75 mg, Daily  diclofenac sodium (VOLTAREN) 1 % gel 2 g, BID PRN  entacapone (COMTAN) tablet 200 mg, 4x Daily  isosorbide mononitrate (IMDUR) extended release tablet 30 mg, Daily  magnesium oxide (MAG-OX) tablet 400 mg, Daily  pantoprazole (PROTONIX) tablet 40 mg, BID AC  tamsulosin (FLOMAX) capsule 0.4 mg, Daily  sodium chloride flush 0.9 % injection 5-40 mL, 2 times per day  sodium chloride flush 0.9 % injection 5-40 mL, PRN  0.9 % sodium chloride infusion, PRN  potassium chloride (KLOR-CON M) extended release tablet 40 mEq, PRN   Or  potassium bicarb-citric acid (EFFER-K) effervescent tablet 40 mEq, PRN   Or  potassium chloride 10 mEq/100 mL IVPB (Peripheral Line), PRN  enoxaparin (LOVENOX) injection 40 mg,

## 2023-05-31 NOTE — DISCHARGE SUMMARY
CDU Discharge Summary        Patient:  Fab Tabares  YOB: 1940    MRN: [id number]   Acct: [de-identified]    Primary Care Physician: JW Westbrook CNP    Admit date:  5/17/2023  1:35 PM  Discharge date: 5/31/2023  3:02 PM    Discharge Diagnoses:     1.)  Patient status post fall. Patient monitored in evaluated by PT and OT. Patient is now up and ambulatory with some assistance. 2.  Patient with rhabdomyolysis. Felt aggravated due to fall. Treated with IV hydration. Improving resolved. 3.  History of renal insufficiency. Patient at baseline. 4.  History of balance issues. Patient with known parkinsonism. Patient at baseline. 5.  History of GI upset. Patient doing reasonably well with improvement over the course of his hospital stay. Continue compliance with medications    6. History of renal insufficiency. Follow-up:  Call today/tomorrow for a follow up appointment with JW Westbrook CNP , or return to the Emergency Room with worsening symptoms    Stressed to patient the importance of following up with primary care doctor for further workup/management of symptoms. Pt verbalizes understanding and agrees with plan. Discharge Medication Changes:       Medication List        CONTINUE taking these medications      allopurinol 100 MG tablet  Commonly known as: ZYLOPRIM  TAKE 1 TABLET BY MOUTH ONCE DAILY     amitriptyline 10 MG tablet  Commonly known as: ELAVIL  Take 1 tablet by mouth nightly     amLODIPine 5 MG tablet  Commonly known as: NORVASC  Take 1 tablet by mouth daily     atorvastatin 40 MG tablet  Commonly known as: LIPITOR  take 1 tablet by mouth once daily     butalbital-acetaminophen-caffeine -40 MG per tablet  Commonly known as: FIORICET, ESGIC  Take 1 tab prn only for severe headache. Can repeat after 4 hrs if needed. Max 2 tabs/24 hrs.  Max 4 tabs/week     carbidopa-levodopa  MG per tablet  Commonly known as: SINEMET  TAKE ONE (1) TABLET BY

## 2023-05-31 NOTE — CARE COORDINATION
Transition planning  Called and spoke with [facility] [employer] re: status of appeal as patient wants to go to SNF  (if approved) vs. Going home with home care. [facility] [employer] states patient has been approved for SNF starting on  through , auth # [id number]. Called and left VM for Latanya at [address] [place] [address] re; above. Called and spoke with Ani at [facility], she states they are contacting [facility] to get an official document stating auth for patient to admit today, they will call  back with update. Called and left VM for Dr. Anabel Weldon to update on above. 1100 Call from Enriqueta at [address] [place] [address], she states they have auth confirmation from insurance and are able to admit patient today. Called and updated Dr. Anabel Weldon, asked him to update signature on JOVANNA and for discharge order. 1200 Faxed AVS and MAR to [place] of [address] 1215 Transport [facility] Called and left VM for Ani at [address] [place] [address], informed her of 2:00  time for patient. Pt's RN Jenni Zamudio updated with p/u time. [phone] Call from [place] at [address] [place] [address], she states they are able to accept patient with p/u time of 2:00 pm.  Pt updated.   Report # [phone]      Discharge [facility] Case Management Department  Written by: John Isbell RN    Patient Name: Mely Mckinney  Attending Provider: Epifanio Turner MD  Admit Date: 2023  1:35 PM  MRN: [id number]  Account: [de-identified]                     : 1940  Discharge Date: 2023      Disposition: SNF    John Isbell RN

## 2023-05-31 NOTE — PLAN OF CARE
Problem: Chronic Conditions and Co-morbidities  Goal: Patient's chronic conditions and co-morbidity symptoms are monitored and maintained or improved  5/31/2023 1436 by Jeannette Mcqueen RN  Outcome: Adequate for Discharge  5/31/2023 0411 by Nu Francis RN  Outcome: Progressing     Problem: Pain  Goal: Verbalizes/displays adequate comfort level or baseline comfort level  5/31/2023 1436 by Jeannette Mcqueen RN  Outcome: Adequate for Discharge  5/31/2023 0411 by Nu Francis RN  Outcome: Progressing     Problem: Safety - Adult  Goal: Free from fall injury  5/31/2023 1436 by Jeannette Mcqueen RN  Outcome: Adequate for Discharge  5/31/2023 0411 by Nu Francis RN  Outcome: Progressing     Problem: Discharge Planning  Goal: Discharge to home or other facility with appropriate resources  5/31/2023 1436 by Jeannette Mcqueen RN  Outcome: Adequate for Discharge  5/31/2023 0411 by Nu Francis RN  Outcome: Progressing     Problem: ABCDS Injury Assessment  Goal: Absence of physical injury  5/31/2023 0411 by Nu Francis RN  Outcome: Progressing

## 2023-06-27 ENCOUNTER — OFFICE VISIT (OUTPATIENT)
Dept: GASTROENTEROLOGY | Age: 83
End: 2023-06-27
Payer: MEDICARE

## 2023-06-27 VITALS
TEMPERATURE: 97.3 F | WEIGHT: 179 LBS | DIASTOLIC BLOOD PRESSURE: 65 MMHG | SYSTOLIC BLOOD PRESSURE: 113 MMHG | BODY MASS INDEX: 28.04 KG/M2

## 2023-06-27 DIAGNOSIS — R13.19 ESOPHAGEAL DYSPHAGIA: Primary | ICD-10-CM

## 2023-06-27 DIAGNOSIS — K21.00 GASTROESOPHAGEAL REFLUX DISEASE WITH ESOPHAGITIS WITHOUT HEMORRHAGE: ICD-10-CM

## 2023-06-27 PROCEDURE — 99214 OFFICE O/P EST MOD 30 MIN: CPT | Performed by: INTERNAL MEDICINE

## 2023-06-27 PROCEDURE — 3078F DIAST BP <80 MM HG: CPT | Performed by: INTERNAL MEDICINE

## 2023-06-27 PROCEDURE — 3074F SYST BP LT 130 MM HG: CPT | Performed by: INTERNAL MEDICINE

## 2023-06-27 PROCEDURE — 1123F ACP DISCUSS/DSCN MKR DOCD: CPT | Performed by: INTERNAL MEDICINE

## 2023-06-27 ASSESSMENT — ENCOUNTER SYMPTOMS
CHOKING: 0
ANAL BLEEDING: 0
WHEEZING: 0
SORE THROAT: 0
COUGH: 0
BLOOD IN STOOL: 0
COLOR CHANGE: 0
ABDOMINAL DISTENTION: 0
RECTAL PAIN: 0
ABDOMINAL PAIN: 0
TROUBLE SWALLOWING: 1
DIARRHEA: 0
SHORTNESS OF BREATH: 0
NAUSEA: 1
CONSTIPATION: 1
VOMITING: 0

## 2023-06-28 ENCOUNTER — TELEPHONE (OUTPATIENT)
Dept: FAMILY MEDICINE CLINIC | Age: 83
End: 2023-06-28
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
## 2023-07-19 ENCOUNTER — HOSPITAL ENCOUNTER (OUTPATIENT)
Dept: GENERAL RADIOLOGY | Facility: CLINIC | Age: 83
Discharge: HOME | End: 2023-07-21
Payer: MEDICARE

## 2023-07-19 ENCOUNTER — HOSPITAL ENCOUNTER (OUTPATIENT)
Facility: CLINIC | Age: 83
Discharge: HOME | End: 2023-07-21
Payer: MEDICARE

## 2023-07-19 DIAGNOSIS — R10.9 CHRONIC ABDOMINAL PAIN: ICD-10-CM

## 2023-07-19 DIAGNOSIS — G89.29 CHRONIC ABDOMINAL PAIN: ICD-10-CM

## 2023-07-19 PROCEDURE — 74019 RADEX ABDOMEN 2 VIEWS: CPT

## 2023-07-25 ENCOUNTER — TELEPHONE (OUTPATIENT)
Dept: GASTROENTEROLOGY | Age: 83
End: 2023-07-25

## 2023-07-25 NOTE — TELEPHONE ENCOUNTER
Received VM from Wilmont with [facility] requesting a call back to schedule EGD. Would need a Monday, Wednesday, or a Friday.     Wilmont [phone]

## 2023-07-26 ENCOUNTER — CARE COORDINATION (OUTPATIENT)
Dept: CARE COORDINATION | Age: 83
End: 2023-07-26

## 2023-07-26 NOTE — CARE COORDINATION
Nurse Care Coordinator Initial Evaluation    7/26/2023      Mike Jackson 1940    Living Situation    Candido Shaffer is a 79 y.o. male living with his legal guardian Miller Allen who is a friend of the family. The home is: 2-story house, tub-shower, walk-in shower. Client is new to the home so he currently sleeps in the living room. Caregiver is having a bedroom upstairs remodeled so that client can sleep in his own bedroom. He has a good social support network. Support includes: patient, son, guardian, and adult caretaker. Social History:   Social History     Socioeconomic History    Marital status: Single     Spouse name: N/a    Number of children: 2 sons    Years of education: Not on file    Highest education level: Not on file   Occupational History    Not on file   Tobacco Use    Smoking status: Former    Smokeless tobacco: Never   Vaping Use    Vaping Use: Never used   Substance and Sexual Activity    Alcohol use: No    Drug use: No    Sexual activity: Not Currently   Other Topics Concern    Not on file   Social History Narrative    Not on file     Social Determinants of Health     Financial Resource Strain: Low Risk     Difficulty of Paying Living Expenses: Not hard at all   Food Insecurity: No Food Insecurity    Worried About Running Out of Food in the Last Year: Never true    [address] in the Last Year: Never true   Transportation Needs: caregiver provides client with transportation to all appointments    Lack of Transportation (Medical): Not on file    Lack of Transportation (Non-Medical):  No   Physical Activity: client can walk with walker    Stress: Not on file   Social Connections: Not on file   Intimate Partner Violence: Not on file   Housing Stability: Unknown    Unable to Pay for Housing in the Last Year: Not on file    Number of [address] in the Last Year: Not on file    Unstable Housing in the Last Year: No       Smoke Detectors in home: Yes  Phone/Emergency Numbers accessible:

## 2023-07-26 NOTE — TELEPHONE ENCOUNTER
Writer contacted Jennie Nunez regarding scheduling patient for EGD-pt has been scheduled on 8/14/23 @11am PAT 7/31/23 @2:45pm. Arie Corrales for Jennie Nunez regarding the PAT due to not being able to get scheduled at time conversation took place, asked Jennie Nunez if any questions  please contact our office.

## 2023-07-31 ENCOUNTER — HOSPITAL ENCOUNTER (OUTPATIENT)
Dept: PREADMISSION TESTING | Age: 83
Discharge: HOME | End: 2023-08-04

## 2023-08-01 VITALS — BODY MASS INDEX: 27.94 KG/M2 | WEIGHT: 178 LBS | HEIGHT: 67 IN

## 2023-08-01 NOTE — PROGRESS NOTES
Pre-op Instructions For Out-Patient Endoscopy Surgery    Medication Instructions:  Please stop herbs and any supplements now (includes vitamins and minerals). Please contact your surgeon and prescribing physician for pre-op instructions for any blood thinners. Emerald Morin will check with Dr Enrique Pearl regarding plavix  If you have inhalers/aerosol treatments at home, please use them the morning of your surgery and bring the inhalers with you to the hospital.    Please take the following medications the morning of your surgery with a sip of water:    isosorbide and amlodipine    Surgery Instructions:  After midnight before surgery:  Do not eat or drink anything, including water, mints, gum, and hard candy. You may brush your teeth without swallowing. No smoking, chewing tobacco, or street drugs. Please shower or bathe before surgery. Please do not wear any cologne, lotion, powder, jewelry, piercings, perfume, makeup, nail polish, hair accessories, or hair spray on the day of surgery. Wear loose comfortable clothing. Leave your valuables at home. Bring a storage case for any glasses/contacts. An adult who is responsible for you MUST drive you home and should be with you for the first 24 hours after surgery. The Day of Surgery:  Arrive at [facility] AT [facility] Surgery Entrance at the time directed by your surgeon and check in at the desk. If you have a living will or healthcare power of , please bring a copy. You will be taken to the pre-op holding area where you will be prepared for surgery. A physical assessment will be performed by a nurse practitioner or house officer. Your IV will be started and you will meet your anesthesiologist.    When you go to surgery, your family will be directed to the surgical waiting room, where the doctor should speak with them after your surgery.     After surgery, you will be taken to the recovery room and or short stay unit for

## 2023-08-10 NOTE — PRE-PROCEDURE INSTRUCTIONS
No answer, left message ? yes                           When were you told to arrive at hospital ?  0900    Do you have a  ? Are you on any blood thinners ? If yes when did you stop taking ? Do you have your prep Rx filled and instruction ? Nothing to eat the day before , only clear liquids. Are you experiencing any covid symptoms ? Do you have any infections or rash we should be aware of ? Do you have the Hibiclens soap to use the night before and the morning of surgery ? Nothing to eat or drink after midnight, only a sip of water to take any medication instructed to take the night before. Wear comfortable clothing, leave any valuables at home, remove any jewelry and body piercing .

## 2023-08-11 ENCOUNTER — ANESTHESIA EVENT (OUTPATIENT)
Dept: ENDOSCOPY | Age: 83
End: 2023-08-11
Payer: MEDICARE

## 2023-08-11 NOTE — PRE-PROCEDURE INSTRUCTIONS
SECOND CALL    Have you received your Prep? Any questions with prep instructions? Only Clear Liquid Diet day before. Nothing to eat after midnight day before procedure. Are you taking any blood thinners? If so, you need to Stop. Remove any jewelry and body piercings. Do you wear glasses? If so, please bring a case to store them in. Are you having any Covid symptoms? Do you have any new rashes, infections, etc. that we should be aware of? Do you have a ride home the day of surgery? It cannot be a cab or medical transportation.   Verify surgery time/date and what time to arrive at hospital.  NO ANSWER VM LEFT

## 2023-08-14 ENCOUNTER — HOSPITAL ENCOUNTER (OUTPATIENT)
Age: 83
Setting detail: OUTPATIENT SURGERY
Discharge: HOME | End: 2023-08-14
Attending: INTERNAL MEDICINE | Admitting: INTERNAL MEDICINE
Payer: MEDICARE

## 2023-08-14 ENCOUNTER — ANESTHESIA (OUTPATIENT)
Dept: ENDOSCOPY | Age: 83
End: 2023-08-14
Payer: MEDICARE

## 2023-08-14 VITALS
BODY MASS INDEX: 27.94 KG/M2 | TEMPERATURE: 97.2 F | RESPIRATION RATE: 20 BRPM | DIASTOLIC BLOOD PRESSURE: 72 MMHG | WEIGHT: 178 LBS | SYSTOLIC BLOOD PRESSURE: 138 MMHG | HEIGHT: 67 IN | OXYGEN SATURATION: 97 % | HEART RATE: 59 BPM

## 2023-08-14 PROCEDURE — 2500000003 HC RX 250 WO HCPCS: Performed by: ANESTHESIOLOGY

## 2023-08-14 PROCEDURE — 43235 EGD DIAGNOSTIC BRUSH WASH: CPT | Performed by: INTERNAL MEDICINE

## 2023-08-14 PROCEDURE — 7100000010 HC PHASE II RECOVERY - FIRST 15 MIN: Performed by: INTERNAL MEDICINE

## 2023-08-14 PROCEDURE — 2580000003 HC RX 258: Performed by: NURSE ANESTHETIST, CERTIFIED REGISTERED

## 2023-08-14 PROCEDURE — 2709999900 HC NON-CHARGEABLE SUPPLY: Performed by: INTERNAL MEDICINE

## 2023-08-14 PROCEDURE — 3700000000 HC ANESTHESIA ATTENDED CARE: Performed by: INTERNAL MEDICINE

## 2023-08-14 PROCEDURE — 7100000011 HC PHASE II RECOVERY - ADDTL 15 MIN: Performed by: INTERNAL MEDICINE

## 2023-08-14 PROCEDURE — 3609012400 HC EGD TRANSORAL BIOPSY SINGLE/MULTIPLE: Performed by: INTERNAL MEDICINE

## 2023-08-14 PROCEDURE — 2580000003 HC RX 258: Performed by: ANESTHESIOLOGY

## 2023-08-14 PROCEDURE — 6360000002 HC RX W HCPCS: Performed by: NURSE ANESTHETIST, CERTIFIED REGISTERED

## 2023-08-14 RX ORDER — SODIUM CHLORIDE 9 MG/ML
INJECTION, SOLUTION INTRAVENOUS PRN
Status: DISCONTINUED | OUTPATIENT
Start: 2023-08-14 | End: 2023-08-14 | Stop reason: HOSPADM

## 2023-08-14 RX ORDER — PROPOFOL 10 MG/ML
INJECTION, EMULSION INTRAVENOUS PRN
Status: DISCONTINUED | OUTPATIENT
Start: 2023-08-14 | End: 2023-08-14 | Stop reason: SDUPTHER

## 2023-08-14 RX ORDER — SODIUM CHLORIDE 0.9 % (FLUSH) 0.9 %
5-40 SYRINGE (ML) INJECTION PRN
Status: DISCONTINUED | OUTPATIENT
Start: 2023-08-14 | End: 2023-08-14 | Stop reason: HOSPADM

## 2023-08-14 RX ORDER — SODIUM CHLORIDE 0.9 % (FLUSH) 0.9 %
5-40 SYRINGE (ML) INJECTION EVERY 12 HOURS SCHEDULED
Status: DISCONTINUED | OUTPATIENT
Start: 2023-08-14 | End: 2023-08-14 | Stop reason: HOSPADM

## 2023-08-14 RX ORDER — SODIUM CHLORIDE, SODIUM LACTATE, POTASSIUM CHLORIDE, CALCIUM CHLORIDE 600; 310; 30; 20 MG/100ML; MG/100ML; MG/100ML; MG/100ML
INJECTION, SOLUTION INTRAVENOUS CONTINUOUS PRN
Status: DISCONTINUED | OUTPATIENT
Start: 2023-08-14 | End: 2023-08-14 | Stop reason: SDUPTHER

## 2023-08-14 RX ORDER — LIDOCAINE HYDROCHLORIDE 10 MG/ML
1 INJECTION, SOLUTION EPIDURAL; INFILTRATION; INTRACAUDAL; PERINEURAL
Status: COMPLETED | OUTPATIENT
Start: 2023-08-14 | End: 2023-08-14

## 2023-08-14 RX ORDER — PROPOFOL 10 MG/ML
INJECTION, EMULSION INTRAVENOUS PRN
Status: DISCONTINUED | OUTPATIENT
Start: 2023-08-14 | End: 2023-08-14

## 2023-08-14 RX ORDER — SODIUM CHLORIDE, SODIUM LACTATE, POTASSIUM CHLORIDE, CALCIUM CHLORIDE 600; 310; 30; 20 MG/100ML; MG/100ML; MG/100ML; MG/100ML
INJECTION, SOLUTION INTRAVENOUS CONTINUOUS
Status: DISCONTINUED | OUTPATIENT
Start: 2023-08-14 | End: 2023-08-14 | Stop reason: HOSPADM

## 2023-08-14 RX ADMIN — SODIUM CHLORIDE, POTASSIUM CHLORIDE, SODIUM LACTATE AND CALCIUM CHLORIDE: 600; 310; 30; 20 INJECTION, SOLUTION INTRAVENOUS at 11:15

## 2023-08-14 RX ADMIN — LIDOCAINE HYDROCHLORIDE 1 ML: 10 INJECTION, SOLUTION EPIDURAL; INFILTRATION; INTRACAUDAL; PERINEURAL at 10:21

## 2023-08-14 RX ADMIN — PROPOFOL 140 MG: 10 INJECTION, EMULSION INTRAVENOUS at 11:20

## 2023-08-14 RX ADMIN — SODIUM CHLORIDE, POTASSIUM CHLORIDE, SODIUM LACTATE AND CALCIUM CHLORIDE: 600; 310; 30; 20 INJECTION, SOLUTION INTRAVENOUS at 10:21

## 2023-08-14 ASSESSMENT — PAIN - FUNCTIONAL ASSESSMENT: PAIN_FUNCTIONAL_ASSESSMENT: NONE - DENIES PAIN

## 2023-08-14 NOTE — H&P
HISTORY and [id number]       NAME:  Vitaly Del Angel  MRN: [id number]   YOB: 1940   Date: 8/14/2023   Age: 79 y.o. Gender: male       COMPLAINT AND PRESENT HISTORY:                  Vitaly Del Angel is 79 y.o., male, undergoing for EGD BIOPSY. ESOPHAGOGASTRODUODENOSCOPY. Pt is being seen for hx of:  Pre-Op Diagnosis Codes:     * Esophageal dysphagia   Patient has his caretaker present, he lives in her home. Pt has hx of Parkinson's with essential tremors on the left, comes in wheelchair and cane. Patient has had previous endoscopies done last January 2023      Patient denies any heartburn, indigestion, acid reflux (GERD) . Pt is taking Protonix. Pt admits to dysphagia  more with taking medication. He states that he chases with water. Pt admits to sporadic regurgitation. No epigastric pain,  reports pain more in lower abdomen with burning  sensation   . Pt admits to nausea . No  vomiting. No changes in appetite. Wt loss. Any changes in bowel habits . No blood in stools, or tarry stools. Patient is on antiplatelet Plavix stopped taking 7 days ago. Patient voices feeling well today. Denies any recent fever or chills, chest pain /pressure . Pt reports  SOB. Cesar Abdiel Hx of smoking : no    Patient has been NPO since midnight. No blood thinners in the past 5-7 days. (Plavix)    Pt denies any issues with Anesthesia.        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

## 2023-08-14 NOTE — OP NOTE
ESOPHAGOGASTRODUODENOSCOPY   ( EGD )  DATE OF PROCEDURE: 8/14/2023     SURGEON: Janett Leyden, MD    ASSISTANT: None    PREOPERATIVE DIAGNOSIS: Patient has history of dysphagia. Procedure performed to evaluate esophageal pathology    POSTOPERATIVE DIAGNOSIS: Probable probably prominent cricohyoid cartilage. No stricture seen. No signs of peptic esophagitis of Gotti's mucosa    Heart    OPERATION: Upper GI endoscopy     ANESTHESIA: MAC    ESTIMATED BLOOD LOSS: None    COMPLICATIONS: None. SPECIMENS:  Was Not Obtained    HISTORY: The patient is a 79y.o. year old male with history of above preop diagnosis. I recommended esophagogastroduodenoscopy with possible biopsy and I explained the risk, benefits, expected outcome, and alternatives to the procedure. Risks included but are not limited to bleeding, infection, respiratory distress, hypotension, and perforation of the esophagus, stomach, or duodenum. Patient understands and is in agreement. PROCEDURE: The patient was given IV conscious sedation. The patient's SPO2 remained above 90% throughout the procedure. Cetacaine spray given. Patient placed in left lateral position. Olympus  videogastroscope was inserted orally under vision into the esophagus without difficulty and advanced into the stomach then through the pylorus up to the second part of duodenum. Findings:    Retropharyngeal area was grossly normal appearing    Esophagus: normal.  No hiatal hernia. No signs of peptic esophagitis or Gotti's mucosa seen. No stricture. May have prominent cricohyoid cartilage. Stomach:    Fundus and Cardia Examined in Retroflexed View: normal    Body: normal    Antrum: normal    Duodenum:     Descending: normal    Bulb: normal    While withdrawing the scope the above findings were verified and the scope was removed. The patient has tolerated the procedure without unusual events.          Recommendations/Plan:     F/U In Office as

## 2023-08-14 NOTE — ANESTHESIA PRE PROCEDURE
Department of Anesthesiology  Preprocedure Note       Name:  Jose A Sinclair   Age:  79 y.o.  :  1940                                          MRN:  [id number]         Date:  2023      Surgeon: Nanette Lott):  Qi East MD    Procedure: Procedure(s):  EGD BIOPSY    Medications prior to admission:   Prior to Admission medications    Medication Sig Start Date End Date Taking?  Authorizing Provider   sucralfate (CARAFATE) 1 GM tablet TAKE ONE (1) TABLET BY MOUTH FOUR (4) TIMES DAILY  Patient taking differently: 3 times daily 23   JW Ellsworth CNP   amLODIPine (NORVASC) 5 MG tablet Take 1 tablet by mouth daily 23   JW Schaeffer CNP   tamsulosin [facility]) 0.4 MG capsule Take 1 capsule by mouth daily 23   JW Schaeffer CNP   pantoprazole (PROTONIX) 40 MG tablet TAKE 1 TABLET BY MOUTH ONCE DAILY for gerd 23   JW Schaeffer CNP   magnesium oxide (MAG-OX) 400 (240 Mg) MG tablet TAKE 1 TABLET BY MOUTH ONCE DAILY 23   JW Schaeffer CNP   isosorbide mononitrate (IMDUR) 30 MG extended release tablet TAKE 1 TABLET BY MOUTH ONCE DAILY 23   JW Schaeffer CNP   entacapone (COMTAN) 200 MG tablet TAKE 1 TABLET BY MOUTH 4 TIMES DAILY FOR PARKINSONS 23   JW Schaeffer CNP   clopidogrel (PLAVIX) 75 MG tablet TAKE 1 TABLET BY MOUTH ONCE DAILY -FOR ANTICOAGULANT 23   JW Schaeffer CNP   amitriptyline (ELAVIL) 10 MG tablet TAKE 1 TABLET BY MOUTH AT BEDTIME FOR DEPRESSION 23   JW Schaeffer CNP   allopurinol (ZYLOPRIM) 100 MG tablet TAKE 1 TABLET BY MOUTH ONCE DAILY FOR GOUT 23   JW Schaeffer CNP   carbidopa-levodopa (SINEMET)  MG per tablet TAKE ONE (1) TABLET BY MOUTH FOUR (4) TIMES DAILY 3/14/23   JW Schaeffer CNP   atorvastatin (LIPITOR) 40 MG tablet take 1 tablet by mouth once daily 3/14/23   Loura Isreal, APRN - CNP   gabapentin (NEURONTIN) 100 MG capsule TAKE 1 CAPSULE BY MOUTH 3 TIMES DAILY FOR

## 2023-08-14 NOTE — DISCHARGE INSTRUCTIONS
Make food into smaller pieces, chew well and swallow    To see in the office as needed    Sedation or General Anesthesia, Adult  Care After  Refer to this sheet in the next 24 hours. These instructions provide you with information on caring for yourself after your procedure. Your caregiver may also give you more specific instructions. Your treatment has been planned according to current medical practices, but problems sometimes occur. Call your caregiver if you have any problems or questions after your procedure. HOME CARE INSTRUCTIONS   Do not participate in any activities that require you to be alert or coordinated. Do not:  Drive. Swim. Ride a bicycle. Operate heavy machinery. Lata Harries. Use power tools. Climb ladders. Work at [employer]. Take a bath. Do not drink alcohol. Do not make any important decisions or sign legal documents. Stay with an adult. The first meal following your procedure should be light and small. Avoid solid foods if you feel sick to your stomach (nauseous) or if you throw up (vomit). Drink enough fluids to keep your urine clear or pale yellow. Only take your usual medicines or new medicines if your caregiver approves them. Only take over-the-counter or prescription medicines for pain, discomfort, or fever as directed by your caregiver. Keep all follow-up appointments as directed by your caregiver. SEEK IMMEDIATE MEDICAL CARE IF:   You are not feeling normal or behaving normally after 24 hours. You have persistent nausea and vomiting. You are unable to drink fluids or eat food. You have difficulty urinating. You have difficulty breathing or speaking. You have blue or gray skin. There is difficulty waking or you cannot be woken up. You have heavy bleeding, redness, or a lot of swelling where the sedative or anesthesia entered your skin (intravenous site). You have a rash. MAKE SURE YOU:  Understand these instructions. Will watch your condition.   Will get help right away

## 2023-08-25 ENCOUNTER — HOSPITAL ENCOUNTER (OUTPATIENT)
Age: 83
Discharge: HOME | End: 2023-08-25
Payer: MEDICARE

## 2023-08-25 LAB
ANION GAP SERPL CALCULATED.3IONS-SCNC: 14 MMOL/L (ref 9–17)
BASOPHILS # BLD: 0.03 K/UL (ref 0–0.2)
BASOPHILS NFR BLD: 1 % (ref 0–2)
BILIRUB UR QL STRIP: NEGATIVE
BUN SERPL-MCNC: 18 MG/DL (ref 8–23)
CALCIUM SERPL-MCNC: 9.4 MG/DL (ref 8.6–10.4)
CASTS #/AREA URNS LPF: ABNORMAL /LPF (ref 0–8)
CHLORIDE SERPL-SCNC: 102 MMOL/L (ref 98–107)
CLARITY UR: CLEAR
CO2 SERPL-SCNC: 20 MMOL/L (ref 20–31)
COLOR UR: ABNORMAL
CREAT SERPL-MCNC: 1.6 MG/DL (ref 0.7–1.2)
EOSINOPHIL # BLD: 0.23 K/UL (ref 0–0.44)
EOSINOPHILS RELATIVE PERCENT: 4 % (ref 1–4)
EPI CELLS #/AREA URNS HPF: ABNORMAL /HPF (ref 0–5)
ERYTHROCYTE [DISTWIDTH] IN BLOOD BY AUTOMATED COUNT: 14.1 % (ref 11.8–14.4)
GFR SERPL CREATININE-BSD FRML MDRD: 42 ML/MIN/1.73M2
GLUCOSE SERPL-MCNC: 152 MG/DL (ref 70–99)
GLUCOSE UR STRIP-MCNC: NEGATIVE MG/DL
HCT VFR BLD AUTO: 44.6 % (ref 40.7–50.3)
HGB BLD-MCNC: 14.7 G/DL (ref 13–17)
HGB UR QL STRIP.AUTO: NEGATIVE
IMM GRANULOCYTES # BLD AUTO: <0.03 K/UL (ref 0–0.3)
IMM GRANULOCYTES NFR BLD: 0 %
KETONES UR STRIP-MCNC: NEGATIVE MG/DL
LEUKOCYTE ESTERASE UR QL STRIP: NEGATIVE
LYMPHOCYTES NFR BLD: 1.97 K/UL (ref 1.1–3.7)
LYMPHOCYTES RELATIVE PERCENT: 38 % (ref 24–43)
MAGNESIUM SERPL-MCNC: 2.1 MG/DL (ref 1.6–2.6)
MCH RBC QN AUTO: 31.5 PG (ref 25.2–33.5)
MCHC RBC AUTO-ENTMCNC: 33 G/DL (ref 28.4–34.8)
MCV RBC AUTO: 95.5 FL (ref 82.6–102.9)
MONOCYTES NFR BLD: 0.48 K/UL (ref 0.1–1.2)
MONOCYTES NFR BLD: 9 % (ref 3–12)
NEUTROPHILS NFR BLD: 48 % (ref 36–65)
NEUTS SEG NFR BLD: 2.46 K/UL (ref 1.5–8.1)
NITRITE UR QL STRIP: NEGATIVE
NRBC BLD-RTO: 0 PER 100 WBC
PH UR STRIP: 5.5 [PH] (ref 5–8)
PHOSPHATE SERPL-MCNC: 3.6 MG/DL (ref 2.5–4.5)
PLATELET # BLD AUTO: 183 K/UL (ref 138–453)
PMV BLD AUTO: 8.8 FL (ref 8.1–13.5)
POTASSIUM SERPL-SCNC: 4.1 MMOL/L (ref 3.7–5.3)
PROT UR STRIP-MCNC: NEGATIVE MG/DL
RBC # BLD AUTO: 4.67 M/UL (ref 4.21–5.77)
RBC #/AREA URNS HPF: ABNORMAL /HPF (ref 0–4)
SODIUM SERPL-SCNC: 136 MMOL/L (ref 135–144)
SP GR UR STRIP: 1.01 (ref 1–1.03)
UROBILINOGEN UR STRIP-ACNC: NORMAL EU/DL (ref 0–1)
WBC #/AREA URNS HPF: ABNORMAL /HPF (ref 0–5)
WBC OTHER # BLD: 5.2 K/UL (ref 3.5–11.3)

## 2023-08-25 PROCEDURE — 80048 BASIC METABOLIC PNL TOTAL CA: CPT

## 2023-08-25 PROCEDURE — 84100 ASSAY OF PHOSPHORUS: CPT

## 2023-08-25 PROCEDURE — 36415 COLL VENOUS BLD VENIPUNCTURE: CPT

## 2023-08-25 PROCEDURE — 81001 URINALYSIS AUTO W/SCOPE: CPT

## 2023-08-25 PROCEDURE — 83735 ASSAY OF MAGNESIUM: CPT

## 2023-08-25 PROCEDURE — 85025 COMPLETE CBC W/AUTO DIFF WBC: CPT

## 2023-08-29 PROBLEM — M17.12 UNILATERAL PRIMARY OSTEOARTHRITIS, LEFT KNEE: Status: ACTIVE | Noted: 2022-06-20

## 2023-08-29 PROBLEM — N28.9 RENAL INSUFFICIENCY SYNDROME: Status: ACTIVE | Noted: 2022-06-30

## 2023-08-29 PROBLEM — Z95.5 PRESENCE OF CORONARY ANGIOPLASTY IMPLANT AND GRAFT: Status: ACTIVE | Noted: 2022-07-22

## 2023-08-29 PROBLEM — I50.30 UNSPECIFIED DIASTOLIC (CONGESTIVE) HEART FAILURE (HCC): Status: ACTIVE | Noted: 2022-06-20

## 2023-08-29 PROBLEM — Z22.322 CARRIER OR SUSPECTED CARRIER OF METHICILLIN RESISTANT STAPHYLOCOCCUS AUREUS: Status: ACTIVE | Noted: 2022-11-01

## 2023-08-29 PROBLEM — K86.89 OTHER SPECIFIED DISEASES OF PANCREAS: Status: ACTIVE | Noted: 2022-11-01

## 2023-08-29 PROBLEM — E11.22 TYPE 2 DIABETES MELLITUS WITH CHRONIC KIDNEY DISEASE (HCC): Status: ACTIVE | Noted: 2022-06-20

## 2023-08-29 PROBLEM — E11.65 TYPE 2 DIABETES MELLITUS WITH HYPERGLYCEMIA (HCC): Status: ACTIVE | Noted: 2022-06-20

## 2023-08-29 PROBLEM — K29.00 ACUTE GASTRITIS WITHOUT BLEEDING: Status: ACTIVE | Noted: 2023-01-13

## 2023-08-29 PROBLEM — K80.20 CALCULUS OF GALLBLADDER WITHOUT CHOLECYSTITIS WITHOUT OBSTRUCTION: Status: ACTIVE | Noted: 2023-01-13

## 2023-08-29 PROBLEM — I13.0 HYPERTENSIVE HEART AND CHRONIC KIDNEY DISEASE WITH HEART FAILURE AND STAGE 1 THROUGH STAGE 4 CHRONIC KIDNEY DISEASE, OR UNSPECIFIED CHRONIC KIDNEY DISEASE (HCC): Status: ACTIVE | Noted: 2022-06-20

## 2023-08-29 PROBLEM — K44.9 DIAPHRAGMATIC HERNIA WITHOUT OBSTRUCTION OR GANGRENE: Status: ACTIVE | Noted: 2022-11-01

## 2023-08-29 PROBLEM — N40.1 BENIGN PROSTATIC HYPERPLASIA WITH LOWER URINARY TRACT SYMPTOMS: Status: ACTIVE | Noted: 2022-11-01

## 2023-08-29 PROBLEM — K57.90 DIVERTICULOSIS OF INTESTINE, PART UNSPECIFIED, WITHOUT PERFORATION OR ABSCESS WITHOUT BLEEDING: Status: ACTIVE | Noted: 2022-06-20

## 2023-08-29 PROBLEM — M12.88 OTHER SPECIFIC ARTHROPATHIES, NOT ELSEWHERE CLASSIFIED, OTHER SPECIFIED SITE: Status: ACTIVE | Noted: 2023-01-13

## 2023-08-29 PROBLEM — R33.8 OTHER RETENTION OF URINE: Status: ACTIVE | Noted: 2022-11-01

## 2023-08-29 PROBLEM — I08.3 COMBINED RHEUMATIC DISORDERS OF MITRAL, AORTIC AND TRICUSPID VALVES: Status: ACTIVE | Noted: 2022-06-20

## 2023-08-30 ENCOUNTER — CARE COORDINATION (OUTPATIENT)
Dept: CARE COORDINATION | Age: 83
End: 2023-08-30

## 2023-09-01 NOTE — CARE COORDINATION
Patient contacted [facility] asking for help with his appointments. [facility] provided assistance and will continue to assist patient with any needs. He was on his way out the door but agreed to enrollment into CC. Will call patient next week to review needs.

## 2023-09-06 ENCOUNTER — HOSPITAL ENCOUNTER (OUTPATIENT)
Age: 83
Setting detail: SPECIMEN
Discharge: HOME | End: 2023-09-06
Payer: MEDICARE

## 2023-09-06 LAB
ANION GAP SERPL CALCULATED.3IONS-SCNC: 11 MMOL/L (ref 9–17)
BASOPHILS # BLD: 0 K/UL (ref 0–0.2)
BASOPHILS NFR BLD: 1 % (ref 0–2)
BILIRUB UR QL STRIP: NEGATIVE
BUN SERPL-MCNC: 16 MG/DL (ref 8–23)
CALCIUM SERPL-MCNC: 8.9 MG/DL (ref 8.6–10.4)
CHLORIDE SERPL-SCNC: 102 MMOL/L (ref 98–107)
CLARITY UR: CLEAR
CO2 SERPL-SCNC: 22 MMOL/L (ref 20–31)
COLOR UR: YELLOW
COMMENT: NORMAL
CREAT SERPL-MCNC: 1.4 MG/DL (ref 0.7–1.2)
EOSINOPHIL # BLD: 0.5 K/UL (ref 0–0.4)
EOSINOPHILS RELATIVE PERCENT: 8 % (ref 0–4)
ERYTHROCYTE [DISTWIDTH] IN BLOOD BY AUTOMATED COUNT: 14.8 % (ref 11.5–14.9)
GFR SERPL CREATININE-BSD FRML MDRD: 50 ML/MIN/1.73M2
GLUCOSE SERPL-MCNC: 127 MG/DL (ref 70–99)
GLUCOSE UR STRIP-MCNC: NEGATIVE MG/DL
HCT VFR BLD AUTO: 41.5 % (ref 41–53)
HGB BLD-MCNC: 13.7 G/DL (ref 13.5–17.5)
HGB UR QL STRIP.AUTO: NEGATIVE
KETONES UR STRIP-MCNC: NEGATIVE MG/DL
LEUKOCYTE ESTERASE UR QL STRIP: NEGATIVE
LYMPHOCYTES NFR BLD: 1.6 K/UL (ref 1–4.8)
LYMPHOCYTES RELATIVE PERCENT: 27 % (ref 24–44)
MAGNESIUM SERPL-MCNC: 2.1 MG/DL (ref 1.6–2.6)
MCH RBC QN AUTO: 31.4 PG (ref 26–34)
MCHC RBC AUTO-ENTMCNC: 33.1 G/DL (ref 31–37)
MCV RBC AUTO: 94.8 FL (ref 80–100)
MONOCYTES NFR BLD: 0.5 K/UL (ref 0.1–1.3)
MONOCYTES NFR BLD: 8 % (ref 1–7)
NEUTROPHILS NFR BLD: 56 % (ref 36–66)
NEUTS SEG NFR BLD: 3.4 K/UL (ref 1.3–9.1)
NITRITE UR QL STRIP: NEGATIVE
PH UR STRIP: 6.5 [PH] (ref 5–8)
PHOSPHATE SERPL-MCNC: 2.8 MG/DL (ref 2.5–4.5)
PLATELET # BLD AUTO: 202 K/UL (ref 150–450)
PMV BLD AUTO: 7.4 FL (ref 6–12)
POTASSIUM SERPL-SCNC: 4.1 MMOL/L (ref 3.7–5.3)
PROT UR STRIP-MCNC: NEGATIVE MG/DL
RBC # BLD AUTO: 4.38 M/UL (ref 4.5–5.9)
SODIUM SERPL-SCNC: 135 MMOL/L (ref 135–144)
SP GR UR STRIP: 1.01 (ref 1–1.03)
UROBILINOGEN UR STRIP-ACNC: NORMAL EU/DL (ref 0–1)
WBC OTHER # BLD: 6 K/UL (ref 3.5–11)

## 2023-09-06 PROCEDURE — 83735 ASSAY OF MAGNESIUM: CPT

## 2023-09-06 PROCEDURE — 85025 COMPLETE CBC W/AUTO DIFF WBC: CPT

## 2023-09-06 PROCEDURE — 87086 URINE CULTURE/COLONY COUNT: CPT

## 2023-09-06 PROCEDURE — 84100 ASSAY OF PHOSPHORUS: CPT

## 2023-09-06 PROCEDURE — 80048 BASIC METABOLIC PNL TOTAL CA: CPT

## 2023-09-06 PROCEDURE — 81003 URINALYSIS AUTO W/O SCOPE: CPT

## 2023-09-07 LAB
MICROORGANISM SPEC CULT: NORMAL
SPECIMEN DESCRIPTION: NORMAL

## 2023-09-08 ENCOUNTER — CARE COORDINATION (OUTPATIENT)
Dept: CARE COORDINATION | Age: 83
End: 2023-09-08

## 2023-09-13 ENCOUNTER — OFFICE VISIT (OUTPATIENT)
Dept: GASTROENTEROLOGY | Age: 83
End: 2023-09-13
Payer: MEDICARE

## 2023-09-13 VITALS
HEART RATE: 58 BPM | HEIGHT: 67 IN | WEIGHT: 186.2 LBS | DIASTOLIC BLOOD PRESSURE: 74 MMHG | BODY MASS INDEX: 29.22 KG/M2 | SYSTOLIC BLOOD PRESSURE: 133 MMHG | TEMPERATURE: 97.1 F

## 2023-09-13 DIAGNOSIS — R13.19 ESOPHAGEAL DYSPHAGIA: Primary | ICD-10-CM

## 2023-09-13 PROCEDURE — 3075F SYST BP GE 130 - 139MM HG: CPT | Performed by: INTERNAL MEDICINE

## 2023-09-13 PROCEDURE — 99213 OFFICE O/P EST LOW 20 MIN: CPT | Performed by: INTERNAL MEDICINE

## 2023-09-13 PROCEDURE — 1123F ACP DISCUSS/DSCN MKR DOCD: CPT | Performed by: INTERNAL MEDICINE

## 2023-09-13 PROCEDURE — APPSS30 APP SPLIT SHARED TIME 16-30 MINUTES: Performed by: NURSE PRACTITIONER

## 2023-09-13 PROCEDURE — 3078F DIAST BP <80 MM HG: CPT | Performed by: INTERNAL MEDICINE

## 2023-09-13 RX ORDER — ROPINIROLE 0.5 MG/1
TABLET, FILM COATED ORAL
COMMUNITY
Start: 2023-08-18

## 2023-09-13 RX ORDER — LATANOPROST 50 UG/ML
SOLUTION/ DROPS OPHTHALMIC
COMMUNITY
Start: 2023-09-11

## 2023-09-13 ASSESSMENT — ENCOUNTER SYMPTOMS
VOICE CHANGE: 0
ABDOMINAL DISTENTION: 0
TROUBLE SWALLOWING: 1
WHEEZING: 0
CONSTIPATION: 0
CHOKING: 1
NAUSEA: 0
RECTAL PAIN: 0
BACK PAIN: 0
VOMITING: 0
BLOOD IN STOOL: 0
ANAL BLEEDING: 0
ABDOMINAL PAIN: 0
SORE THROAT: 0
SINUS PRESSURE: 0
DIARRHEA: 0
COUGH: 0

## 2023-09-15 ENCOUNTER — CARE COORDINATION (OUTPATIENT)
Dept: CARE COORDINATION | Age: 83
End: 2023-09-15

## 2023-09-15 NOTE — CARE COORDINATION
Attempting to reach patient for a follow up care coordination call regarding any needs, questions or concerns. Jori aTte [facility] [phone]  No answer, VM is full.

## 2023-09-15 NOTE — CARE COORDINATION
Patient returned call stating he is doing well. He asked for the name of his new PCP and appointment time. ACM provided information. Patient denied any other needs and quickly ended the call.

## 2023-09-18 ENCOUNTER — CARE COORDINATION (OUTPATIENT)
Dept: CARE COORDINATION | Age: 83
End: 2023-09-18

## 2023-09-21 ENCOUNTER — CARE COORDINATION (OUTPATIENT)
Dept: CARE COORDINATION | Age: 83
End: 2023-09-21

## 2023-09-21 NOTE — CARE COORDINATION
Patient called asking if he can see pain management again. ACM called pain management and left a VM requesting a call to patient to get him scheduled. Provided my information as well for any questions. Patient notified to keep his phone by him to answer their call. He denied any other needs today. Jennifer Fabry, NEEMAN, RN ambulatory care manager [phone].

## 2023-09-25 ENCOUNTER — CARE COORDINATION (OUTPATIENT)
Dept: CASE MANAGEMENT | Age: 83
End: 2023-09-25

## 2023-09-25 NOTE — CARE COORDINATION
Request from [facility] Shakira Harley RN.,    Can you please call patient to see exactly what pain management needs to get him scheduled again. He wants to be seen in there office again but was in and out of the hospital, then sent to a SNF for awhile. Not sure what they are requesting if anything from PCP. His ride has dialysis 3 x weekly so we need to schedule around those days so that he has transportation    Spoke with pain management,  he has not been seen in over a year. They will need a new referral, once they receive the referral they will call an schedule patient. ACM aware.     Erik Ayala, [facility]  Care Coordination Transition

## 2023-09-26 ENCOUNTER — CARE COORDINATION (OUTPATIENT)
Dept: CASE MANAGEMENT | Age: 83
End: 2023-09-26

## 2023-09-26 NOTE — CARE COORDINATION
Pain management referral is in chart. Called office, they will call patient to schedule.     Bee Eldridge, [facility] Coordination Transition

## 2023-09-28 ENCOUNTER — CARE COORDINATION (OUTPATIENT)
Dept: CARE COORDINATION | Age: 83
End: 2023-09-28

## 2023-09-28 NOTE — CARE COORDINATION
Ambulatory Care Coordination Note  2023    Patient Current Location:  Home: [address] [place] [id number]     ACM contacted the patient by telephone. Verified name and  with patient as identifiers. Provided introduction to self, and explanation of the ACM role. Challenges to be reviewed by the provider   Additional needs identified to be addressed with provider: No  none               Method of communication with provider: none. Date Care Coordination Episode Started:  23      Reason patient is in Care Coordination:     PCP referral    Topics Discussed Today:       Medications, no needs. Pain management appointment, patient was encouraged to find out if he has transportation and ACM will check back in 1 week to help arrange if needed. Patient denied any needs today from PCP or ACM. Assessments completed today:     Fall risk  Initial assessment  Medication reconciliation  [facility]      Care Coordinator plan of care: Follow up in 1 week, continue education and support. Offered patient enrollment in the Remote Patient Monitoring (RPM) program for in-home monitoring: Patient is not eligible for RPM program.       Goals Addressed                   This Visit's Progress     Medication Management   On track     I will take my medication as directed. I will notify my provider of any problems with medications, like adverse effects or side effects. I will notify my provider/Care Coordinator if I am unable to afford my medications. I will notify my provider for advice before I stop taking any of my medication.     Barriers: overwhelmed by complexity of regimen  Plan for overcoming my barriers: work with ACM   Confidence: 9/10  Anticipated Goal Completion Date: 22              Future Appointments   Date Time Provider [address]   10/12/2023  1:20 PM Trina Cedeño MD [address] [place] [address]   10/18/2023  2:00 PM Arnulfo Rodriguez MD Neuro [facility] -   10/19/2023  2:30 PM

## 2023-10-03 ENCOUNTER — CARE COORDINATION (OUTPATIENT)
Dept: CARE COORDINATION | Age: 83
End: 2023-10-03

## 2023-10-03 NOTE — CARE COORDINATION
Patient called today, stating that he hadn't talked to the [facility]. in a long time. He needs transportation to his appointment on 10/12/23 @ 1:20p going to  Pain Management @ [facility]. Patient is scheduled to be picked up by [employer] @12:20p and will be transported back home. Patient has  been provided with this information.     Plan of Care  [facility]. will continue to assist patient with his medical rides with [facility] no

## 2023-10-05 ENCOUNTER — CARE COORDINATION (OUTPATIENT)
Dept: CARE COORDINATION | Age: 83
End: 2023-10-05

## 2023-10-05 NOTE — CARE COORDINATION
Attempting to reach patient for a follow up care coordination call regarding any needs, questions or concerns. Noted patient is scheduled with pain management and ride has been arranged. Kael Cuevas, BSN, RN ambulatory care manager [phone].

## 2023-10-06 NOTE — CARE COORDINATION
Patient called back, stating everything is ok. He is hoping to get some relief from pain management for his knee. He understands that [facility]. set up his transportation and said that he is all set. No needs today per patient. Xiomara Saunders, BSN, RN ambulatory care manager [phone].

## 2023-10-12 ENCOUNTER — CARE COORDINATION (OUTPATIENT)
Dept: CARE COORDINATION | Age: 83
End: 2023-10-12

## 2023-10-12 ENCOUNTER — HOSPITAL ENCOUNTER (OUTPATIENT)
Dept: PAIN MANAGEMENT | Age: 83
Discharge: HOME | End: 2023-10-12
Payer: MEDICARE

## 2023-10-12 VITALS
BODY MASS INDEX: 29.22 KG/M2 | HEIGHT: 67 IN | WEIGHT: 186.2 LBS | OXYGEN SATURATION: 97 % | DIASTOLIC BLOOD PRESSURE: 75 MMHG | SYSTOLIC BLOOD PRESSURE: 124 MMHG | HEART RATE: 76 BPM

## 2023-10-12 DIAGNOSIS — R69 MULTIPLE COMORBID CONDITIONS: Chronic | ICD-10-CM

## 2023-10-12 DIAGNOSIS — M17.12 PRIMARY OSTEOARTHRITIS OF LEFT KNEE: Primary | ICD-10-CM

## 2023-10-12 PROCEDURE — 99214 OFFICE O/P EST MOD 30 MIN: CPT | Performed by: ANESTHESIOLOGY

## 2023-10-12 PROCEDURE — 99215 OFFICE O/P EST HI 40 MIN: CPT

## 2023-10-12 PROCEDURE — 6360000002 HC RX W HCPCS: Performed by: ANESTHESIOLOGY

## 2023-10-12 PROCEDURE — 20610 DRAIN/INJ JOINT/BURSA W/O US: CPT

## 2023-10-12 RX ORDER — TRIAMCINOLONE ACETONIDE 40 MG/ML
40 INJECTION, SUSPENSION INTRA-ARTICULAR; INTRAMUSCULAR ONCE
Status: COMPLETED | OUTPATIENT
Start: 2023-10-12 | End: 2023-10-12

## 2023-10-12 RX ORDER — BUPIVACAINE HYDROCHLORIDE 2.5 MG/ML
3 INJECTION, SOLUTION EPIDURAL; INFILTRATION; INTRACAUDAL ONCE
Status: COMPLETED | OUTPATIENT
Start: 2023-10-12 | End: 2023-10-12

## 2023-10-12 RX ADMIN — BUPIVACAINE HYDROCHLORIDE 7.5 MG: 2.5 INJECTION, SOLUTION EPIDURAL; INFILTRATION; INTRACAUDAL; PERINEURAL at 14:05

## 2023-10-12 RX ADMIN — TRIAMCINOLONE ACETONIDE 40 MG: 40 SUSPENSION INTRA-ARTERIAL; INTRAMUSCULAR at 14:07

## 2023-10-12 ASSESSMENT — ENCOUNTER SYMPTOMS
DIARRHEA: 0
VOMITING: 0
SHORTNESS OF BREATH: 0
CONSTIPATION: 0
CHEST TIGHTNESS: 0
WHEEZING: 0
NAUSEA: 0
BACK PAIN: 1

## 2023-10-12 ASSESSMENT — PAIN DESCRIPTION - DESCRIPTORS: DESCRIPTORS: ACHING;BURNING;CRAMPING;STABBING;SHOOTING

## 2023-10-12 ASSESSMENT — PAIN DESCRIPTION - FREQUENCY: FREQUENCY: CONTINUOUS

## 2023-10-12 ASSESSMENT — PAIN DESCRIPTION - ORIENTATION: ORIENTATION: RIGHT;LEFT

## 2023-10-12 ASSESSMENT — PAIN SCALES - GENERAL: PAINLEVEL_OUTOF10: 8

## 2023-10-12 ASSESSMENT — PAIN DESCRIPTION - LOCATION: LOCATION: KNEE

## 2023-10-12 ASSESSMENT — PAIN DESCRIPTION - PAIN TYPE: TYPE: CHRONIC PAIN

## 2023-10-12 NOTE — CARE COORDINATION
Patient had [facility] a [facility] the [facility]. stating that one of his children  wanted to drive him to his Pain Management Appointment @ [address] [facility]. phoned returned the patient's call and informed him that she would  cancel his transportation with [employer].  and cancelled patient's appointment.     Plan of Care  [facility] will follow up with patient for next transportation need

## 2023-10-12 NOTE — PROGRESS NOTES
The patient is a 79 y. o.  /  male.     Chief Complaint   Patient presents with    Knee Pain        HPI      79-year-old gentleman with multiple major comorbidities  Here today for flareup of left knee pain  Pain is chronic going on for many years  He is diagnosed with severe knee arthritis  Recent imaging shows near complete loss of joint space  Pain is constant aggravates with all activities particularly standing and walking  Not associated swelling  No dermatomal numbness or paresthesia    No recent knee injection  No previous surgical history  He has done therapy in past but not interested in repeating it    Will order topical Voltaren gel  He is interested in knee cortisone injection  We will inject his knee today on left side    If this measures fail then consider for viscosupplementation or surgical referral      Patient is here today for: bilateral knee pain  Pain level: 8  Character: aching, burning, shooting, stabbing, cramping  Radiating: yes  Weakness or numbness: yes  Aggravating Factors: walking, standing, bending, twisting  Alleviating Factors: nothing  Constant or intermitting: constant  Bladder/bowel loss: no        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 ([address]) 3/18/2011    d/t being hurt on the job    CKD (chronic kidney disease) stage 2, GFR 60-89 ml/min     CKD (chronic kidney disease) stage 3, GFR 30-59 ml/min ([facility])     Diverticulosis of colon     GERD (gastroesophageal reflux disease)     History of non-ST elevation myocardial infarction (NSTEMI) 6/2022 10/27/2022    Impaired renal function     MRSA (methicillin resistant staph aureus) culture positive 9/23/2015    leg    Osteoarthritis of knee     Left    Parkinson disease     Proteinuria     Skull fracture ([address]) 3/18/2011    d/t 12-foot drop on the job    Temporary loss of eyesight     periodically, happened x7    Tubular adenoma of colon 2012, 2017    mulitple        Past

## 2023-10-13 ENCOUNTER — CARE COORDINATION (OUTPATIENT)
Dept: CARE COORDINATION | Age: 83
End: 2023-10-13

## 2023-10-13 NOTE — CARE COORDINATION
Attempting to reach patient for a follow up care coordination call. Left detailed message for the patient to return my call with any questions or concerns. Appointment reminders left on VM, encouraged patient to make sure his rides are set up and to call with any needs. Jennifer Fabry, NEEMAN, RN ambulatory care manager [phone].

## 2023-10-16 ENCOUNTER — CARE COORDINATION (OUTPATIENT)
Dept: CARE COORDINATION | Age: 83
End: 2023-10-16

## 2023-10-16 NOTE — CARE COORDINATION
[facility]. phoned [employer] to schedule patient's  Upcoming appointments for Wed. 10/18/23 @ 2p to   [facility]Neuro @ [address], [place], [address] and for  [address]. 10/19/23 @ [place] [address], [place], [facility] [address]. Patient will be picked   up one hour prior to his appointments. HC attempted to reach patient today regarding his   upcoming appointments for Wed. 10/18/23 and   for Thurs. 10/19/23. HC unsure if patient has   transportation to these appointments, so scheduled  just in case. Unable to verify the need or to know if   to cancel, due to no answer.     Plan of Care  [facility], [facility]. will make another attempt to contact patient today

## 2023-10-16 NOTE — CARE COORDINATION
HC attempted to contact the patient noticing that he has two  upcoming appointments for Wed. 10/17/23 and Thurs. 10/19/23. HC attempted to contact the patient to determine if he needs   transportation to the appointments. There was no answer, HC  left a message for the patient and requested a return call.      Plan of Care  If no response today from patient, [place] [facility]. will make transportation  arrangements for appointments and will cancel if need be

## 2023-10-17 ENCOUNTER — CARE COORDINATION (OUTPATIENT)
Dept: CARE COORDINATION | Age: 83
End: 2023-10-17

## 2023-10-17 NOTE — CARE COORDINATION
Attempting to reach patient for a follow up care coordination call regarding any needs, questions or concerns. Milagros Mcfarlane, [facility] [phone]  No VM available  Would like to verify patient has transportation to upcoming appointments and check on needs.

## 2023-10-18 NOTE — CARE COORDINATION
Attempting to reach patient for a follow up care coordination call. Left detailed message for the patient to return my call with any questions or concerns.
15

## 2023-10-24 ENCOUNTER — CARE COORDINATION (OUTPATIENT)
Dept: CARE COORDINATION | Age: 83
End: 2023-10-24

## 2023-10-24 NOTE — CARE COORDINATION
Attempting to reach patient for a follow up care coordination call. Left detailed message for the patient to return my call with any questions or concerns. Noted patient missed 2 appointments last week, encouraged him to call back if he needs assistance rescheduling. Laura Krabbe, NEEMAN, RN ambulatory care manager [phone].

## 2023-10-30 ENCOUNTER — CARE COORDINATION (OUTPATIENT)
Dept: CARE COORDINATION | Age: 83
End: 2023-10-30

## 2023-10-30 NOTE — CARE COORDINATION
Attempting to reach patient for a follow up care coordination call regarding any needs, questions or concerns.   Arun Hardwick, [address] [facility] [address]

## 2023-11-06 ENCOUNTER — CARE COORDINATION (OUTPATIENT)
Dept: CARE COORDINATION | Age: 83
End: 2023-11-06

## 2023-11-06 ENCOUNTER — HOSPITAL ENCOUNTER (OUTPATIENT)
Age: 83
Discharge: HOME | End: 2023-11-06
Payer: MEDICARE

## 2023-11-06 LAB
BASOPHILS # BLD: <0.03 K/UL (ref 0–0.2)
BASOPHILS NFR BLD: 0 % (ref 0–2)
EOSINOPHIL # BLD: 0.21 K/UL (ref 0–0.44)
EOSINOPHILS RELATIVE PERCENT: 3 % (ref 1–4)
ERYTHROCYTE [DISTWIDTH] IN BLOOD BY AUTOMATED COUNT: 14 % (ref 11.8–14.4)
EST. AVERAGE GLUCOSE BLD GHB EST-MCNC: 137 MG/DL
HBA1C MFR BLD: 6.4 % (ref 4–6)
HCT VFR BLD AUTO: 45.1 % (ref 40.7–50.3)
HGB BLD-MCNC: 15.1 G/DL (ref 13–17)
IMM GRANULOCYTES # BLD AUTO: <0.03 K/UL (ref 0–0.3)
IMM GRANULOCYTES NFR BLD: 0 %
LYMPHOCYTES NFR BLD: 2.13 K/UL (ref 1.1–3.7)
LYMPHOCYTES RELATIVE PERCENT: 30 % (ref 24–43)
MCH RBC QN AUTO: 32 PG (ref 25.2–33.5)
MCHC RBC AUTO-ENTMCNC: 33.5 G/DL (ref 28.4–34.8)
MCV RBC AUTO: 95.6 FL (ref 82.6–102.9)
MONOCYTES NFR BLD: 0.67 K/UL (ref 0.1–1.2)
MONOCYTES NFR BLD: 9 % (ref 3–12)
NEUTROPHILS NFR BLD: 58 % (ref 36–65)
NEUTS SEG NFR BLD: 4.09 K/UL (ref 1.5–8.1)
NRBC BLD-RTO: 0 PER 100 WBC
PLATELET # BLD AUTO: 192 K/UL (ref 138–453)
PMV BLD AUTO: 9.2 FL (ref 8.1–13.5)
RBC # BLD AUTO: 4.72 M/UL (ref 4.21–5.77)
WBC OTHER # BLD: 7.1 K/UL (ref 3.5–11.3)

## 2023-11-06 PROCEDURE — 36415 COLL VENOUS BLD VENIPUNCTURE: CPT

## 2023-11-06 PROCEDURE — 83036 HEMOGLOBIN GLYCOSYLATED A1C: CPT

## 2023-11-06 PROCEDURE — 85025 COMPLETE CBC W/AUTO DIFF WBC: CPT

## 2023-11-06 NOTE — CARE COORDINATION
Attempting to reach patient for a follow up care coordination call. Left detailed message for the patient to return my call with any questions or concerns. Include appointment reminder for upcoming pain management appointment.

## 2023-11-09 ENCOUNTER — CARE COORDINATION (OUTPATIENT)
Dept: CARE COORDINATION | Age: 83
End: 2023-11-09

## 2023-11-09 NOTE — CARE COORDINATION
HC received message from [facility], who stated  that the patient had left her a voicemail regarding his appointment  on 11/13/23 for pain management, and wondered if the patient   would be able to make the appointment. HC stressed to the patient  that she is willing to make arrangements for his transportation. Patient   stated that he's aware that he has an appointment and his landlord will  transport him. HC also informed the patient about his new PCP appoint-  ment on 11/16/23 @ [facility]. Patient took the information down and stated  that his landlord will transport him to his appointment. He stated that  he doesn't understand why he is getting a new PCP. HC informed the   patient that she's not sure why.     Plan of Care  [facility] OF [place], [facility]. will follow up with patient on 11/10/23 to verify transportation for appointments

## 2023-11-09 NOTE — CARE COORDINATION
ACM received VM from patient stating that he is wondering if he has an appointment on 11.13.23. ACM called back to confirm his PM appointment, no answer and VM is full. Noted patient needs to be scheduled with a new PCP to be able to get refills. Will have ZEB Lomax try to reach patient as well.

## 2023-11-13 ENCOUNTER — HOSPITAL ENCOUNTER (OUTPATIENT)
Dept: PAIN MANAGEMENT | Age: 83
Discharge: HOME | End: 2023-11-13
Payer: MEDICARE

## 2023-11-13 VITALS — HEIGHT: 66 IN | BODY MASS INDEX: 29.89 KG/M2 | WEIGHT: 186 LBS | TEMPERATURE: 97.3 F

## 2023-11-13 DIAGNOSIS — M54.16 LUMBAR RADICULOPATHY: ICD-10-CM

## 2023-11-13 DIAGNOSIS — M25.562 CHRONIC PAIN OF LEFT KNEE: ICD-10-CM

## 2023-11-13 DIAGNOSIS — M48.061 SPINAL STENOSIS OF LUMBAR REGION WITHOUT NEUROGENIC CLAUDICATION: ICD-10-CM

## 2023-11-13 DIAGNOSIS — M17.12 PRIMARY OSTEOARTHRITIS OF LEFT KNEE: ICD-10-CM

## 2023-11-13 DIAGNOSIS — G89.29 CHRONIC PAIN OF LEFT KNEE: ICD-10-CM

## 2023-11-13 DIAGNOSIS — R69 MULTIPLE COMORBID CONDITIONS: Primary | Chronic | ICD-10-CM

## 2023-11-13 DIAGNOSIS — M47.816 LUMBAR FACET ARTHROPATHY: ICD-10-CM

## 2023-11-13 PROCEDURE — 99213 OFFICE O/P EST LOW 20 MIN: CPT

## 2023-11-13 PROCEDURE — 99214 OFFICE O/P EST MOD 30 MIN: CPT | Performed by: NURSE PRACTITIONER

## 2023-11-13 ASSESSMENT — ENCOUNTER SYMPTOMS
SHORTNESS OF BREATH: 0
COUGH: 0
CONSTIPATION: 0
BACK PAIN: 1
BOWEL INCONTINENCE: 0

## 2023-11-13 ASSESSMENT — PAIN SCALES - GENERAL: PAINLEVEL_OUTOF10: 6

## 2023-11-13 NOTE — PROGRESS NOTES
Multiple comorbid conditions - Primary (Chronic)    Lumbar facet arthropathy    Spinal stenosis of lumbar region without neurogenic claudication    Chronic pain of left knee    Lumbar radiculopathy    Primary osteoarthritis of left knee          Treatment Plan:  Patient reports only three days of relief following left knee steroid injection - pain is back to baseline  He would like to try viscosupplementation  He would also like to see a surgeon for surgical evaluation - he will call us with the name of the surgeon he would like to see  Follow up appointment made for 4 weeks    I have reviewed the chief complaint and history of present illness (including ROS and PFSH) and vital documentation by my staff and I agree with their documentation and have added where applicable.

## 2023-11-14 ENCOUNTER — CARE COORDINATION (OUTPATIENT)
Dept: CARE COORDINATION | Age: 83
End: 2023-11-14

## 2023-11-14 NOTE — CARE COORDINATION
HC received a message from [facility], who stated that the   patient needs transportation to his upcoming appointment to PCP @ [facility]. HC arranged transportation through [employer] program for   Thursday, 11/16/23, @ 3:45p. Patient will be picked up @ 2:45p, and will  have his friend to ride with him.     Plan of Care  [facility] OF [place], [facility]. will follow up with patient regarding additional transp needs

## 2023-11-14 NOTE — CARE COORDINATION
Patient called stating that he went to his pain management appointment and he thinks he needs a referral to go for an evaluation for a knee replacement. I explained that he will need to keep his upcoming new patient appointment with his new PCP to get this. He stated he was not aware of this appointment and he will do his best to get a ride. I explained that if Miguel Maki needs to help with the transportation scheduling she will need to do this ASAP. He said well my ride is at dialysis now, I will check when she gets home. I then told him if she is at dialysis today then she will have dialysis on Thursday as well. He agreed to have Miguel Maki help him with transportation. [facility]. Appointment 11-16-23 at 3:45 pm at the [facility] practice   [address]  Phone [phone]  Dr. Lindaann Gosselin also provided to patient.

## 2023-11-16 ENCOUNTER — OFFICE VISIT (OUTPATIENT)
Dept: FAMILY MEDICINE CLINIC | Age: 83
End: 2023-11-16
Payer: MEDICARE

## 2023-11-16 VITALS
DIASTOLIC BLOOD PRESSURE: 68 MMHG | HEART RATE: 86 BPM | SYSTOLIC BLOOD PRESSURE: 115 MMHG | BODY MASS INDEX: 29.07 KG/M2 | WEIGHT: 185.2 LBS | HEIGHT: 67 IN

## 2023-11-16 DIAGNOSIS — I25.119 CORONARY ARTERY DISEASE INVOLVING NATIVE HEART WITH ANGINA PECTORIS, UNSPECIFIED VESSEL OR LESION TYPE (HCC): ICD-10-CM

## 2023-11-16 DIAGNOSIS — Z23 ENCOUNTER FOR VACCINATION: ICD-10-CM

## 2023-11-16 DIAGNOSIS — Z00.00 MEDICARE ANNUAL WELLNESS VISIT, SUBSEQUENT: Primary | ICD-10-CM

## 2023-11-16 PROCEDURE — 3078F DIAST BP <80 MM HG: CPT

## 2023-11-16 PROCEDURE — 90686 IIV4 VACC NO PRSV 0.5 ML IM: CPT | Performed by: FAMILY MEDICINE

## 2023-11-16 PROCEDURE — G0439 PPPS, SUBSEQ VISIT: HCPCS

## 2023-11-16 PROCEDURE — 90715 TDAP VACCINE 7 YRS/> IM: CPT | Performed by: FAMILY MEDICINE

## 2023-11-16 PROCEDURE — 90746 HEPB VACCINE 3 DOSE ADULT IM: CPT | Performed by: FAMILY MEDICINE

## 2023-11-16 PROCEDURE — 1123F ACP DISCUSS/DSCN MKR DOCD: CPT

## 2023-11-16 PROCEDURE — 3074F SYST BP LT 130 MM HG: CPT

## 2023-11-16 ASSESSMENT — PATIENT HEALTH QUESTIONNAIRE - PHQ9
7. TROUBLE CONCENTRATING ON THINGS, SUCH AS READING THE NEWSPAPER OR WATCHING TELEVISION: 0
5. POOR APPETITE OR OVEREATING: 0
8. MOVING OR SPEAKING SO SLOWLY THAT OTHER PEOPLE COULD HAVE NOTICED. OR THE OPPOSITE, BEING SO FIGETY OR RESTLESS THAT YOU HAVE BEEN MOVING AROUND A LOT MORE THAN USUAL: 0
4. FEELING TIRED OR HAVING LITTLE ENERGY: 0
SUM OF ALL RESPONSES TO PHQ QUESTIONS 1-9: 2
SUM OF ALL RESPONSES TO PHQ9 QUESTIONS 1 & 2: 1
9. THOUGHTS THAT YOU WOULD BE BETTER OFF DEAD, OR OF HURTING YOURSELF: 0
SUM OF ALL RESPONSES TO PHQ QUESTIONS 1-9: 2
10. IF YOU CHECKED OFF ANY PROBLEMS, HOW DIFFICULT HAVE THESE PROBLEMS MADE IT FOR YOU TO DO YOUR WORK, TAKE CARE OF THINGS AT HOME, OR GET ALONG WITH OTHER PEOPLE: 0
SUM OF ALL RESPONSES TO PHQ QUESTIONS 1-9: 2
2. FEELING DOWN, DEPRESSED OR HOPELESS: 1
6. FEELING BAD ABOUT YOURSELF - OR THAT YOU ARE A FAILURE OR HAVE LET YOURSELF OR YOUR FAMILY DOWN: 0
SUM OF ALL RESPONSES TO PHQ QUESTIONS 1-9: 2
3. TROUBLE FALLING OR STAYING ASLEEP: 1
1. LITTLE INTEREST OR PLEASURE IN DOING THINGS: 0

## 2023-11-16 ASSESSMENT — LIFESTYLE VARIABLES
HOW OFTEN DO YOU HAVE A DRINK CONTAINING ALCOHOL: NEVER
HOW MANY STANDARD DRINKS CONTAINING ALCOHOL DO YOU HAVE ON A TYPICAL DAY: PATIENT DOES NOT DRINK

## 2023-11-16 ASSESSMENT — COLUMBIA-SUICIDE SEVERITY RATING SCALE - C-SSRS
7. DID THIS OCCUR IN THE LAST THREE MONTHS: NO
3. HAVE YOU BEEN THINKING ABOUT HOW YOU MIGHT KILL YOURSELF?: NO
4. HAVE YOU HAD THESE THOUGHTS AND HAD SOME INTENTION OF ACTING ON THEM?: NO
5. HAVE YOU STARTED TO WORK OUT OR WORKED OUT THE DETAILS OF HOW TO KILL YOURSELF? DO YOU INTEND TO CARRY OUT THIS PLAN?: NO

## 2023-11-17 ENCOUNTER — CLINICAL DOCUMENTATION (OUTPATIENT)
Dept: SPIRITUAL SERVICES | Age: 83
End: 2023-11-17

## 2023-11-17 NOTE — ACP (ADVANCE CARE PLANNING)
Provider's office to be uploaded into Patient's Chart. Patient has Declined ACP Conversation. Patient confirmed Domestic Partner Kym Gibson as Primary Health Care Decision. Patient agreed to bring paperwork in. Referral can be Closed. [] 2nd -  Date:                 Intervention:  [] Spoke with Patient  [] Left Voice mail [] Email / Mail    [] [employer]  [] Other [id number]) : Outcomes:                [] 3rd -  Date:                Intervention:  [] Spoke with Patient   [] Left Voice mail [] Email / Mail    [] [employer]  [] Other [id number]) : Outcomes:           []  Additional Outreach -  Date:     (Specify Dates & special circumstances): Outcomes:          Thank you for this referral.

## 2023-11-20 ENCOUNTER — CARE COORDINATION (OUTPATIENT)
Dept: CARE COORDINATION | Age: 83
End: 2023-11-20

## 2023-11-20 NOTE — CARE COORDINATION
HC attempted to contact the patient, there was no answer. HC left a message for the patient and provided contact information   For a return call.     Plan of Care  [facility]. will follow up with patient if no response, regarding transportation
pt received semi olea position in bed, NAD, agreeable to PT,
Pt received on 4TWR, RN Charley dial'ed pt for PT. pt observed semi-olea in bed with telemonitor with , waite, pleasant, cooperative, A&O and c/o 0/10 pain

## 2023-11-27 ENCOUNTER — TELEPHONE (OUTPATIENT)
Dept: PAIN MANAGEMENT | Age: 83
End: 2023-11-27

## 2023-11-27 ENCOUNTER — CARE COORDINATION (OUTPATIENT)
Dept: CARE COORDINATION | Age: 83
End: 2023-11-27

## 2023-11-27 DIAGNOSIS — M47.817 LUMBOSACRAL SPONDYLOSIS WITHOUT MYELOPATHY: ICD-10-CM

## 2023-11-27 DIAGNOSIS — M48.061 SPINAL STENOSIS OF LUMBAR REGION WITHOUT NEUROGENIC CLAUDICATION: ICD-10-CM

## 2023-11-27 DIAGNOSIS — M54.16 LUMBAR RADICULOPATHY: Primary | ICD-10-CM

## 2023-11-27 NOTE — CARE COORDINATION
Attempting to reach patient for a follow up care coordination call. Left detailed message for the patient to return my call with any questions or concerns. Would like to verify new PCP appointment went well before handing patient off to appropriate ACM. NEEMA BrownN, RN ambulatory care manager [phone].

## 2023-11-27 NOTE — CARE COORDINATION
Patient returned call. Patient did go to his new patient appointment at [facility]. He scheduled a follow up for next month and is requesting Delfin's help with scheduling a ride. Will hand off to Mor Garcia RN ACMANDO. He was provided with Robin's number and I told him if he gets confused and calls me, it is ok. I can redirect him. No needs today per patient. Re established with Pain management recently for knee pain. Will sign off at this time.

## 2023-11-27 NOTE — TELEPHONE ENCOUNTER
Patient calls [facility] stating he would like to see a surgeon at [facility]; will route to CNP

## 2023-11-28 ENCOUNTER — TELEPHONE (OUTPATIENT)
Dept: FAMILY MEDICINE CLINIC | Age: 83
End: 2023-11-28

## 2023-11-28 ENCOUNTER — TELEPHONE (OUTPATIENT)
Dept: PAIN MANAGEMENT | Age: 83
End: 2023-11-28

## 2023-11-28 ENCOUNTER — CARE COORDINATION (OUTPATIENT)
Dept: CARE COORDINATION | Age: 83
End: 2023-11-28

## 2023-11-28 NOTE — TELEPHONE ENCOUNTER
Received call from patient's wife stating during patient's last visit, he was given another patient's paperwork and lab orders at checkout (MRN # [de-identified]). Patient's wife shredded the paperwork while on phone with writer and  was informed. No

## 2023-11-28 NOTE — TELEPHONE ENCOUNTER
I called patient and advised Aida Goodson CNP sent a referral to [facility] SPECIAL[facility]. Patient given phone number and was told to call if he doesn't hear from them in a few days. Patient verbalizes understanding.

## 2023-11-28 NOTE — CARE COORDINATION
[facility]. contacted [employer] and arranged for transportation  to his PCP appointment on 12/15/23. Patient's appointment is @ 8:30a,  patient will be picked up @ 7:30a.  HC attempted to contact this patient ,  There was no answer. HC left a message for the patient and provided the  contact number for a return call. Plan of Care  [facility] will reach out to patient again if no return call in a couple of days    Patient returned the [facility] call apparently hadn't heard the voicemail   regarding his PCP appointment and his pickup time for the senior  cab. HC attempted to contact the patient and found that his voicemail  was full.       Plan of Care  [facility] will attempt to contact the patient on 11/29/23 and will share the information

## 2023-11-29 ENCOUNTER — TELEPHONE (OUTPATIENT)
Dept: FAMILY MEDICINE CLINIC | Age: 83
End: 2023-11-29

## 2023-11-29 DIAGNOSIS — G20.A1 PARKINSON'S DISEASE, UNSPECIFIED WHETHER DYSKINESIA PRESENT, UNSPECIFIED WHETHER MANIFESTATIONS FLUCTUATE: Primary | ICD-10-CM

## 2023-11-29 NOTE — TELEPHONE ENCOUNTER
Jesusita Piedra from [facility] called stating that this pt was referred to [facility] for home care, however, Jesusita Piedra stated that this pt has had [facility] in the past and that pt would like a referral to [facility] for home care.   Once this is done, they will need last 2 notes and a copy of the referral.  [facility] - phone - [phone]                 - fax - [phone]

## 2023-11-30 ENCOUNTER — CARE COORDINATION (OUTPATIENT)
Dept: CARE COORDINATION | Age: 83
End: 2023-11-30

## 2023-11-30 ENCOUNTER — TELEPHONE (OUTPATIENT)
Dept: FAMILY MEDICINE CLINIC | Age: 83
End: 2023-11-30

## 2023-11-30 NOTE — TELEPHONE ENCOUNTER
Nahun called they needs either the note from 11/16/23 addend or we can wait until next follow up on 12/15/23, Nahun needs more documentation on his Parkinson for his home health care.

## 2023-11-30 NOTE — CARE COORDINATION
[facility]. phoned and informed the patient of his appointment on 12/15/23 @ [facility], @ [address]  @ 8:30a.  [facility]. informed the patient that he will be picked u around 7:30p,  by [facility].   Patient was grateful for the information    Plan of Care  [facility] will follow up with patient closer to his appt for a reminder

## 2023-11-30 NOTE — TELEPHONE ENCOUNTER
We can wait for Dec 15 appt and can address whatever is needed exactly for our patient to get home health.  Thank you

## 2023-12-04 ENCOUNTER — CARE COORDINATION (OUTPATIENT)
Dept: CARE COORDINATION | Age: 83
End: 2023-12-04

## 2023-12-04 NOTE — CARE COORDINATION
Ambulatory Care Coordination Note  2023    Patient Current Location:  Home: [address] [place] [address]     ACM contacted the patient by telephone. Verified name and  with patient as identifiers. Provided introduction to self, and explanation of the ACM role. Patient's significant other was on speaker phone and conversing with Writer as well. Challenges to be reviewed by the provider   Additional needs identified to be addressed with provider: No  none               Method of communication with provider: staff message. ACM: Stefania Alvarez RN    Patient was transferred to [facility] for ACM by Nixon Hearn RN, [facility]. Her note is copied and pasted below. MARCO ANTONIO Suresh RN  Patient did go to his new patient appointment at [facility]. He scheduled a follow up for next month and is requesting Delfin's help with scheduling a ride. He was provided with LINAGORA's number and I told him if he gets confused and calls me, it is ok. I can redirect him. Let me know if I can help with anything. No needs today per patient. Re established with Pain management recently for knee pain. Biggest issue is missing appointments due to confusion. YRN Suresh, RN ambulatory care manager      Offered patient enrollment in the Remote Patient Monitoring (RPM) program for in-home monitoring: Patient is not eligible for RPM program.    Writer phoned Patient to introduce self. He and Blair Weir were given [employer]. They were informed Writevanda is a part of the ACM team with Emmanuel. Delfin scheduled transportation for appointment with Dr. Nina Doss on 12/15/23. Patient denies concerns today. Patient and Blair Weir state Patient has all of his medications. Writer plans to follow up with Patient next week.     Lab Results       None            Care Coordination Interventions    Referral from Primary Care Provider:

## 2023-12-13 ENCOUNTER — CARE COORDINATION (OUTPATIENT)
Dept: CARE COORDINATION | Age: 83
End: 2023-12-13

## 2023-12-13 NOTE — CARE COORDINATION
Received a VM from patient and caregiver asking who his new PCP is. They are having trouble getting his medications refilled. I called patient and care giver back, had to leave a VM, included PCP name and contact number along with the [facility] contact information for this office, Arun Madison RN. Will update [facility] as well.

## 2023-12-13 NOTE — CARE COORDINATION
HC attempted to contact the patient regarding his need for medical transportation. There was no answer, HC was unable to get an answer or to leave a message for   the patient. HC noticed that the patient has an appointment scheduled for 12/15/23  @ 8:30a @ [facility]. HC attempted to contact [facility] and there was  no answer.     Plan of Care  [facility] OF [facility]. will attempt to contact the patient on 12/14'/23

## 2023-12-14 ENCOUNTER — CARE COORDINATION (OUTPATIENT)
Dept: CARE COORDINATION | Age: 83
End: 2023-12-14

## 2023-12-14 NOTE — CARE COORDINATION
Ambulatory Care Coordination Note  12/14/2023      AC attempted to contact the patient by telephone. His phone went to voice mail. His voice mail is full. Writer phoned Sulma Kaufman., and left message on her voice mail stating Derrek Baltazar is attempting to contact Mr. Michael Norwood. Writer request return call. Writer's contact information was provided. Method of communication with provider: staff message. ACM: Tad Weaver RN    CC Plan:       Writer received message copied and pasted below from MARCO ANTONIO Beth, [facility]:    Received: MARCO ANTONIO Moreno RN  Just FYI,   He will probably call me for awhile and that is ok. He gets confused easily. Received a VM from patient and caregiver asking who his new PCP is. They are having trouble getting his medications refilled. I called patient and care giver back, had to leave a VM, included PCP name and contact number along with the ACM contact information for this office, Jennifer Crowell RN. -Shanice     2. Review medications with Patient. 3.  Review upcoming appointments with Patient.       Future Appointments   Date Time Provider [address] [place] [address]   12/15/2023  8:30 AM Zaria Meyers  Juani Perez patient enrollment in the Remote Patient Monitoring (RPM) program for in-home monitoring: Patient is not eligible for RPM program.    Lab Results       None            Care Coordination Interventions    Referral from Primary Care Provider: Yes  Suggested Interventions and Community Resources          Goals Addressed    None         Future Appointments   Date Time Provider [address] [place] [address]   12/15/2023  8:30 AM Zaria Meyers MD [address] [place] [address]   2/9/2024  9:30 AM Deisy Nagel MD [facility] Xray Ankle Complete 3 Views, Left

## 2023-12-14 NOTE — CARE COORDINATION
HC attempted to contact the patient on 12/13/23, and there was no answer. HC attempted to reach  [facility] [employer] to arrange the patient's transportation for 12/15/23, and there was no answer. [facility] contacted [employer] on 12/14/23 and found that transportation had already been   Arranged for the patient's appointment on 12/15/23, going to [facility] @ 8:30a. Ani from [facility]  Stated that the patient should be ready @ 7:30a. [facility] the [facility] call and stated that he  Missed the [facility] call. HC confirmed with the patient that he will be ready for his transportation on  12/15/23 @ or around 7:30a. Patient stated an understanding.     Plan of Care  [facility] will follow up with patient for his next PCP appointment to get it scheduled

## 2023-12-14 NOTE — CARE COORDINATION
HC attempted to contact the patient regarding his PCP appointment on Friday, 12/15/23,  8:30a @ [facility]. There was no answer, HC attempted to contact [employer], to make arrangements. [employer] had closed for the afternoon. HC phoned and spoke to [facility] and found that patient's transportation was already in place. HC attempted to phone the patient this morning and found that he wasn't available. HC left a message for the patient, he called back and we clarified his appointment time and   pickup time. Patient stated an understanding.     Plan of Care  [facility], [facility]. will reach out to patient before the holidays for social needs

## 2023-12-15 ENCOUNTER — HOSPITAL ENCOUNTER (OUTPATIENT)
Age: 83
Setting detail: SPECIMEN
Discharge: HOME | End: 2023-12-15

## 2023-12-15 DIAGNOSIS — M1A.9XX0 CHRONIC GOUT WITHOUT TOPHUS, UNSPECIFIED CAUSE, UNSPECIFIED SITE: ICD-10-CM

## 2023-12-15 DIAGNOSIS — I10 ESSENTIAL HYPERTENSION: ICD-10-CM

## 2023-12-15 LAB
CHOLEST SERPL-MCNC: 83 MG/DL (ref 0–199)
CHOLESTEROL/HDL RATIO: 2
HDLC SERPL-MCNC: 43 MG/DL
LDLC SERPL CALC-MCNC: 16 MG/DL (ref 0–100)
TRIGL SERPL-MCNC: 123 MG/DL (ref 0–149)
URATE SERPL-MCNC: 4.4 MG/DL (ref 3.4–7)
VLDLC SERPL CALC-MCNC: 25 MG/DL

## 2023-12-21 ENCOUNTER — TELEPHONE (OUTPATIENT)
Dept: FAMILY MEDICINE CLINIC | Age: 83
End: 2023-12-21

## 2023-12-21 DIAGNOSIS — K20.90 ESOPHAGITIS: ICD-10-CM

## 2023-12-21 DIAGNOSIS — G20.A1 PARKINSON'S DISEASE, UNSPECIFIED WHETHER DYSKINESIA PRESENT, UNSPECIFIED WHETHER MANIFESTATIONS FLUCTUATE: Primary | ICD-10-CM

## 2023-12-21 NOTE — TELEPHONE ENCOUNTER
Rosie from [place] called and patient is in need of refills for Ropinirole and Pantoprazole. They know that the old PCP prescribed them but he is now in need of refills and was just seen in office on 12/15/23. They said its very hard to get the patient out of the house. Can we refill them for the patient. Please advise.

## 2023-12-23 RX ORDER — PANTOPRAZOLE SODIUM 40 MG/1
TABLET, DELAYED RELEASE ORAL
Qty: 30 TABLET | Refills: 3 | Status: SHIPPED | OUTPATIENT
Start: 2023-12-23

## 2023-12-23 RX ORDER — ROPINIROLE 0.5 MG/1
0.5 TABLET, FILM COATED ORAL 3 TIMES DAILY
Qty: 90 TABLET | Refills: 5 | Status: SHIPPED | OUTPATIENT
Start: 2023-12-23

## 2023-12-28 ENCOUNTER — CARE COORDINATION (OUTPATIENT)
Dept: CARE COORDINATION | Age: 83
End: 2023-12-28

## 2023-12-28 NOTE — CARE COORDINATION
HC phoned and spoke with the patient and discussed his need for transportation  to his appointments. Patient mentioned an appointment to the eye doctor on 01/03/24. He didn't have the information ready, but will need transportation. HC asked the patient  to contact her by 12/29/23 to be able to arrange his transportation.     Plan of Care  [facility], [facility]. will follow up with patient if no response on 12/29/23

## 2023-12-29 ENCOUNTER — CARE COORDINATION (OUTPATIENT)
Dept: CARE COORDINATION | Age: 83
End: 2023-12-29

## 2023-12-29 NOTE — CARE COORDINATION
[facility]. phoned the patient to inquire about his eye appointment that's scheduled for  Wednesday, Jan.3rd.  [facility]. asked the patient about the location, date and time, of   the appointment. Patient stated that he would get the information and call the [facility]  back to schedule his transportation.       Plan of Care  [facility] will contact the patient on 01/20/24

## 2024-01-02 ENCOUNTER — CARE COORDINATION (OUTPATIENT)
Dept: CARE COORDINATION | Age: 84
End: 2024-01-02

## 2024-01-02 NOTE — CARE COORDINATION
HC received a message on [employer] that he has found transportation to his   eye appointment on 01/03/24.  HC phoned the patient to verify that he does   have transportation to his appointment.  Patient stated that he does have   transportation.    Plan of Care  HC will assist patient with his upcoming appointments

## 2024-01-05 ENCOUNTER — CARE COORDINATION (OUTPATIENT)
Dept: CARE COORDINATION | Age: 84
End: 2024-01-05

## 2024-01-05 NOTE — CARE COORDINATION
Ambulatory Care Coordination Note  1/5/2024      ACM attempted to contacted the patient by telephone.  Writer left a message on Patient and his significant other's voice mail requesting a return call.    Method of communication with provider: staff message.    ACM: Robin Perry RN    CC Plan:       Review medications with Patient    2.  Review upcoming appointments with Patient.    Future Appointments   Date Time Provider Department Center   1/11/2024  3:20 PM Chuy Bailey MD [facility]   1/26/2024  9:30 AM Jamal Barnes MD [facility]   2/9/2024  9:30 AM Lobito Tracy MD [facility]   2/12/2024  1:00 PM Slime Baker APRN - CNP [facility]     3.  Patient kept appointment with Dr. Barnes on 12/19/23.  His assessment and plan of care is copied and pasted below for review with Patient.    ASSESSMENT/PLAN:     1. Essential hypertension  -     Lipid Panel; Future  2. Primary osteoarthritis of left knee  3. Esophagitis  -     Ross De La O MD, Gastroenterology, [place] (Need to schedule an appointment)  4. Parkinson's disease, unspecified whether dyskinesia present, unspecified whether manifestations fluctuate  -     [facility], Neurology, [facility]  5. Chronic gout without tophus, unspecified cause, unspecified site  -     Uric Acid; Future    Patient given another referral to home health care given his history of Parkinson and difficulty with activities of daily living.  Patient will benefit from having home health assist in activities of daily living.  Patient will also benefit from PT and OT in order to decrease his risk of fall and injury.     For gout patient's current medications list allopurinol.  We will repeat uric acid level at this time.     For osteoarthritis of left knee patient is following with pain management and recently had a steroid injection as well.  No further

## 2024-01-05 NOTE — CARE COORDINATION
HC contacted patient to inquire if he has transportation for his appointment  to [facility], on Thursday, 01/11/24 @ 3:20p.  Patient stated  that he will have to check to see if he can get a ride.  HC asked that the patient  let the HC know by 01/08/24, if at all possible.    Plan of Care  HC will follow up with patient  01/08/24  late afternoon regarding transportation

## 2024-01-08 ENCOUNTER — CARE COORDINATION (OUTPATIENT)
Dept: CARE COORDINATION | Age: 84
End: 2024-01-08

## 2024-01-08 NOTE — CARE COORDINATION
HC phoned and left a message for this patient to inquire if he had already   arranged  transportation for his Pain Mgment appointment on Thursday, 01/11/24.  Patient left a message for the HC stating that he has an appointment on 01/11/24,  and asked for a return call. HC called the patient back to inquire if he has transportation,  patient stated that he doesn't have transportation.  HC will contact [employer] and  will get back with the patient on 01/09/24.    Plan of Care  HC will contact [facility] on 01/09/24, and will get back with the patient

## 2024-01-09 ENCOUNTER — CARE COORDINATION (OUTPATIENT)
Dept: CARE COORDINATION | Age: 84
End: 2024-01-09

## 2024-01-09 NOTE — CARE COORDINATION
HC phoned [employer] and scheduled the patient for his medical  transportation to [facility]Pain Management on 01/11/24, @ 3:20p.  Issa  is scheduled to be picked up @ 2:20p and will call for his return ride home   with one person  riding with him.  HC has left several messages for this patient.    Plan of Care  HC will speak with patient when he returns the call.

## 2024-01-09 NOTE — CARE COORDINATION
Patient called back and was given the information for his pain management appointment  on 01/11/24.  HC informed the patient that he will be picked up @ 2:20p.  Patient   stated an understanding.    Plan of Care  HC will be in touch with patient within a week to schedule transportation   for his upcoming PCP appointment.

## 2024-01-10 ENCOUNTER — TELEPHONE (OUTPATIENT)
Dept: GASTROENTEROLOGY | Age: 84
End: 2024-01-10

## 2024-01-10 NOTE — TELEPHONE ENCOUNTER
Attempt 1 - lvm to schedule 6 month follow up per provider note (referral in wq) Schedule in March 2024  Attempt 2 - letter mailed

## 2024-01-11 ENCOUNTER — HOSPITAL ENCOUNTER (OUTPATIENT)
Dept: PAIN MANAGEMENT | Age: 84
Discharge: HOME | End: 2024-01-11
Payer: MEDICARE

## 2024-01-11 ENCOUNTER — CARE COORDINATION (OUTPATIENT)
Dept: CARE COORDINATION | Age: 84
End: 2024-01-11

## 2024-01-11 VITALS — WEIGHT: 190 LBS | HEIGHT: 67 IN | BODY MASS INDEX: 29.82 KG/M2

## 2024-01-11 DIAGNOSIS — R69 MULTIPLE COMORBID CONDITIONS: Chronic | ICD-10-CM

## 2024-01-11 DIAGNOSIS — M17.0 PRIMARY OSTEOARTHRITIS OF BOTH KNEES: Primary | Chronic | ICD-10-CM

## 2024-01-11 DIAGNOSIS — M47.817 LUMBOSACRAL SPONDYLOSIS WITHOUT MYELOPATHY: Chronic | ICD-10-CM

## 2024-01-11 PROCEDURE — 99213 OFFICE O/P EST LOW 20 MIN: CPT | Performed by: ANESTHESIOLOGY

## 2024-01-11 PROCEDURE — 99213 OFFICE O/P EST LOW 20 MIN: CPT

## 2024-01-11 ASSESSMENT — ENCOUNTER SYMPTOMS
NAUSEA: 0
DIARRHEA: 0
CONSTIPATION: 0
VOMITING: 0

## 2024-01-11 NOTE — PROGRESS NOTES
The patient is a 83 y.o.Non- / non  male.    Chief Complaint   Patient presents with    Knee Pain     Left knee         Knee Pain   Associated symptoms include numbness.     This is a 83-year-old man with multiple major comorbidities  He is seen in the clinic for history of chronic low back and knee pain  Major pain issue is left knee pain  Discussed the pain is constant going on for several years aggravates with activity standing walking  Patient is diagnosed with severe knee arthritis  He has done physical therapy in past and not interested in repeating it  Patient of tried topical NSAIDs  He had steroid injection with limited benefit  He is interested in viscosupplementation  Will schedule patient for ultrasound-guided knee Synvisc 1 injection    Patient is here today for: Knee pain   Pain level: 9  Character:  Pressure   Radiating: to ankle and up to hip   Weakness or numbness:  yes both   Aggravating Factors:  walking   Alleviating Factors: Bengay meds   Constant or intermitting:  constant   Bladder/bowel loss: no      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 ([facility]) 3/18/2011    d/t being hurt on the job    CKD (chronic kidney disease) stage 2, GFR 60-89 ml/min     CKD (chronic kidney disease) stage 3, GFR 30-59 ml/min ([facility])     Diverticulosis of colon     GERD (gastroesophageal reflux disease)     History of non-ST elevation myocardial infarction (NSTEMI) 6/2022 10/27/2022    Impaired renal function     MRSA (methicillin resistant staph aureus) culture positive 9/23/2015    leg    Osteoarthritis of knee     Left    Parkinson disease     Proteinuria     Skull fracture ([facility]) 3/18/2011    d/t 12-foot drop on the job    Temporary loss of eyesight     periodically, happened x7    Tubular adenoma of colon 2012, 2017    isaiah        Past Surgical History:   Procedure Laterality Date    CHOLECYSTECTOMY, LAPAROSCOPIC N/A 10/29/2022    LAPROSCOPIC

## 2024-01-11 NOTE — CARE COORDINATION
patient's current medications list allopurinol.  We will repeat uric acid level at this time.     For osteoarthritis of left knee patient is following with pain management and recently had a steroid injection as well.  No further action at this time.     Return in about 6 weeks (around 1/26/2024), or if symptoms worsen or fail to improve, for Chronic comorbidities.    Offered patient enrollment in the Remote Patient Monitoring (RPM) program for in-home monitoring: Patient is not eligible for RPM program.    Phoned Patient for ACM update and to discuss cc plan of care.  Patient states he is feeling well.  He denies chest pain and shortness of breath.  He is aware of his appointment this afternoon at 3:20 pm.  He states he hopes his transportation shows up.  Patient's appointment is at the pain clinic.  Patient complains of pain to his left knee.  He was encouraged to inform the Pain doctor today.      Patient states he walks with a walker.  He states he has a wheelchair.    Writer requested to view Patient's medications with him.  He seemed to state his medications are bubble wrapped.  Writer question if Patient's significant other is available to review medications.  He states she is \"under the weather\".      Patient states his home care nurse visited him yesterday.  He believes she is coming back on Friday.    Patient request Meals on Wheels.  He states he used to get Meals on Wheels before he moved.  He states he lives with his significant other.  He states when he moved they no longer came.  Writer will request assistance of [employer] [employer].    Writer phoned Dr. Garcia's office to schedule a new Patient appointment today.  Message was left on Scheduling line with request for return call.    Diagnosis Information    Diagnosis   K20.90 (ICD-10-CM) - Esophagitis          [facility]- Ross Garcia MD   [address] [place] [address]   [phone]    Return

## 2024-01-11 NOTE — H&P (VIEW-ONLY)
The patient is a 83 y.o.Non- / non  male.    Chief Complaint   Patient presents with    Knee Pain     Left knee         Knee Pain   Associated symptoms include numbness.     This is a 83-year-old man with multiple major comorbidities  He is seen in the clinic for history of chronic low back and knee pain  Major pain issue is left knee pain  Discussed the pain is constant going on for several years aggravates with activity standing walking  Patient is diagnosed with severe knee arthritis  He has done physical therapy in past and not interested in repeating it  Patient of tried topical NSAIDs  He had steroid injection with limited benefit  He is interested in viscosupplementation  Will schedule patient for ultrasound-guided knee Synvisc 1 injection    Patient is here today for: Knee pain   Pain level: 9  Character:  Pressure   Radiating: to ankle and up to hip   Weakness or numbness:  yes both   Aggravating Factors:  walking   Alleviating Factors: Bengay meds   Constant or intermitting:  constant   Bladder/bowel loss: no      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 ([facility]) 3/18/2011    d/t being hurt on the job    CKD (chronic kidney disease) stage 2, GFR 60-89 ml/min     CKD (chronic kidney disease) stage 3, GFR 30-59 ml/min ([facility])     Diverticulosis of colon     GERD (gastroesophageal reflux disease)     History of non-ST elevation myocardial infarction (NSTEMI) 6/2022 10/27/2022    Impaired renal function     MRSA (methicillin resistant staph aureus) culture positive 9/23/2015    leg    Osteoarthritis of knee     Left    Parkinson disease     Proteinuria     Skull fracture ([facility]) 3/18/2011    d/t 12-foot drop on the job    Temporary loss of eyesight     periodically, happened x7    Tubular adenoma of colon 2012, 2017    isaiah        Past Surgical History:   Procedure Laterality Date    CHOLECYSTECTOMY, LAPAROSCOPIC N/A 10/29/2022    LAPROSCOPIC

## 2024-01-11 NOTE — CARE COORDINATION
HC received a call from patient who stated that he's finished with his  Pain management appointment and ready for his cab.  HC phoned   [employer] and requested patient's cab.  HC phoned the  patient and let him know that his cab will be on his way.    Plan of Care  HC will reach out to [employer] regarding patient's meals  need to changed to his new address for delivery

## 2024-01-12 ENCOUNTER — CARE COORDINATION (OUTPATIENT)
Dept: CARE COORDINATION | Age: 84
End: 2024-01-12

## 2024-01-12 NOTE — CARE COORDINATION
It was mentioned to the HC that patient hasn't been receiving his meals since   he moved.  HC phoned [employer] and left a message for Lucero.  Lucero  returned the call and the HC inquired about if patient had received meals   from [employer].  It was verified that the patient did receive his meals from [employer].  HC provided patient's new address to start getting his meals again.  HC   phoned and arranged patient's transportation for his GI appointment on Mon. 01/29/24, @[address], [address].  Patient's  time would be   @ 1p.  Patient also has another appointment on Fri. 01/26/24 @ 9:30a, @   [address].  HC will attempt to speak with  patient and inquire if he can find transportation to this appointment, since   he will travel to his appointment on the 29th and this will be his second  trip with [facility] for the month.  HC attempted to contact  the patient to share the updates, his voicemail is full.    Plan of Care  HC will reach out to patient to share information

## 2024-01-15 ENCOUNTER — CARE COORDINATION (OUTPATIENT)
Dept: CARE COORDINATION | Age: 84
End: 2024-01-15

## 2024-01-15 NOTE — CARE COORDINATION
spoke with the patient to assist him with his transportation to his speciality appointments.   arranged for the patient's appointment to his GI appointment on 01/29 going to [place].  This will be patient's last ride with the [employer], for the month.   informed the patient that he will need to get transportation for his PCP appointment on  01/26/24 @ 9:30a , to [address]  believes that the patient will have a greater  chance of getting to that appointment with it being closer to his home.     arranged for patient's Februrary appointments, on  Wednesday, 02/07/24......   Patient has a procedure scheduled @ the [facility]  Patient's   appointment time will be @ 3:30p, he will be picked up @ 2:30p, and has a   return trip arranged for him.    Patient's second ride for Monday, 02/12/24 @ 1p. @ [address] [facility] [address]  for [facility].  Patient will be picked up @ 12p and transported to his appointment  and a return ride is scheduled for the patient.    Plan of Care  This information will be shared with the patient prior to time for  his upcoming appointments.

## 2024-01-18 ENCOUNTER — CARE COORDINATION (OUTPATIENT)
Dept: CARE COORDINATION | Age: 84
End: 2024-01-18

## 2024-01-18 NOTE — CARE COORDINATION
HC attempted to contact the patient regarding the ACP process.  There was no answer, HC left a message for the patient.    Plan of Care  HC will attempt to reach out to the patient at another time

## 2024-01-19 ENCOUNTER — CARE COORDINATION (OUTPATIENT)
Dept: CARE COORDINATION | Age: 84
End: 2024-01-19

## 2024-01-19 NOTE — CARE COORDINATION
HC attempted to contact the patient regarding his transportation   need and another topic.  There was no answer, HC left a message  for the patient and will try to reach him on 01/22/24.      Plan of Care  HC will attempt to reach out the patient regarding transportation

## 2024-01-22 ENCOUNTER — CARE COORDINATION (OUTPATIENT)
Dept: CARE COORDINATION | Age: 84
End: 2024-01-22

## 2024-01-22 NOTE — CARE COORDINATION
HC attempted to contact the patient and to speak with him regarding the ACP  Plan.  HC was unable to reach the patient, the call didn't go through.    Plan of Care  HC will attempt to reach patient on 01/23/24  To inquire of patient's interest

## 2024-01-22 NOTE — CARE COORDINATION
Ambulatory Care Coordination Note  2024    Patient Current Location:  Home: [address] [place] [address]  [place] [address]  [place] [address]     ACM contacted the patient by telephone. Verified name and  with patient and spouse/partner as identifiers. Provided introduction to self, and explanation of the ACM role.     Challenges to be reviewed by the provider   Additional needs identified to be addressed with provider: No  none               Method of communication with provider: staff message.    ACM: Robin Perry RN    CC Plan:       Review medications with Patient     2.  Review upcoming appointments with Patient.     Future Appointments   Date Time Provider Department Center   2024  9:30 AM Jamal Barnes MD [facility]   2024  2:00 PM Ross Garcia MD [facility]   2024  4:30 PM Chuy Bailey MD [facility]   2024  9:30 AM Lobito Tracy MD [facility] [place]   2024  1:00 PM Slime Baker, APRN - CNP [facility]        3.  Patient kept appointment with Dr. Barnes on 23.  His assessment and plan of care is copied and pasted below for review with Patient.     ASSESSMENT/PLAN:     1. Essential hypertension  -     Lipid Panel; Future  2. Primary osteoarthritis of left knee  3. Esophagitis  -     Ross De La O MD, Gastroenterology, [place] (Need to schedule an appointment)  4. Parkinson's disease, unspecified whether dyskinesia present, unspecified whether manifestations fluctuate  -     [facility]  5. Chronic gout without tophus, unspecified cause, unspecified site  -     Uric Acid; Future    4.  Patient kept appointment with Dr. Bailey, Pain Management on 2024 for left knee pain.  His plan of care was reviewed.  He plans to schedule Patient for ultrasound-guided knee Synvisc injection.  Appointment scheduled 2024.

## 2024-01-23 ENCOUNTER — CARE COORDINATION (OUTPATIENT)
Dept: CARE COORDINATION | Age: 84
End: 2024-01-23

## 2024-01-23 NOTE — CARE COORDINATION
HC phoned the patient and there was no answer.  Patient   Returned the call stating that he had been trying to contact  The HC.  HC told the patient that she has tried contacting   Him several times as well.  HC shared with the patient that  She is aware that Robin Perry/AMBER has been in touch  With him, and reviewed his appointments with him.  HC   Reminded the patient that he has to get transportation to   His PCP appointment on 0126/24 to [address] [facility].  Patient stated an understanding.  HC engaged in conversation  With the patient regarding the ACP plan, as had been mentioned  Before.  Patient stated that he remembered the conversation about  Needing his son to agree to be his HCPOA.  HC shared with the  Patient that she could meet him @ his PCP appointment on 01/26/24  And share more information with him, prior to or after his appointment.  Patient stated that he would like that.     Plan of Care  HC will meet patient @ [facility] @ 9a on 01/26/24

## 2024-01-25 ENCOUNTER — TELEPHONE (OUTPATIENT)
Dept: GASTROENTEROLOGY | Age: 84
End: 2024-01-25

## 2024-01-25 NOTE — TELEPHONE ENCOUNTER
Writer called to talk to stacie to see if he could switch his about to the 31 1 or 2 of jan with yao instead of the 29th with [place].

## 2024-01-26 ENCOUNTER — OFFICE VISIT (OUTPATIENT)
Dept: FAMILY MEDICINE CLINIC | Age: 84
End: 2024-01-26
Payer: MEDICARE

## 2024-01-26 ENCOUNTER — CARE COORDINATION (OUTPATIENT)
Dept: CARE COORDINATION | Age: 84
End: 2024-01-26

## 2024-01-26 VITALS
SYSTOLIC BLOOD PRESSURE: 119 MMHG | DIASTOLIC BLOOD PRESSURE: 64 MMHG | WEIGHT: 185.4 LBS | HEART RATE: 85 BPM | OXYGEN SATURATION: 95 % | HEIGHT: 67 IN | BODY MASS INDEX: 29.1 KG/M2 | TEMPERATURE: 96.8 F

## 2024-01-26 DIAGNOSIS — I10 ESSENTIAL HYPERTENSION: ICD-10-CM

## 2024-01-26 DIAGNOSIS — R07.89 ATYPICAL CHEST PAIN: Primary | ICD-10-CM

## 2024-01-26 PROCEDURE — 99211 OFF/OP EST MAY X REQ PHY/QHP: CPT | Performed by: STUDENT IN AN ORGANIZED HEALTH CARE EDUCATION/TRAINING PROGRAM

## 2024-01-26 RX ORDER — GABAPENTIN 100 MG/1
CAPSULE ORAL
COMMUNITY
Start: 2024-01-04

## 2024-01-26 ASSESSMENT — PATIENT HEALTH QUESTIONNAIRE - PHQ9
5. POOR APPETITE OR OVEREATING: 0
4. FEELING TIRED OR HAVING LITTLE ENERGY: 1
SUM OF ALL RESPONSES TO PHQ9 QUESTIONS 1 & 2: 0
2. FEELING DOWN, DEPRESSED OR HOPELESS: 0
SUM OF ALL RESPONSES TO PHQ QUESTIONS 1-9: 8
SUM OF ALL RESPONSES TO PHQ QUESTIONS 1-9: 8
1. LITTLE INTEREST OR PLEASURE IN DOING THINGS: 0
6. FEELING BAD ABOUT YOURSELF - OR THAT YOU ARE A FAILURE OR HAVE LET YOURSELF OR YOUR FAMILY DOWN: 0
SUM OF ALL RESPONSES TO PHQ QUESTIONS 1-9: 8
3. TROUBLE FALLING OR STAYING ASLEEP: 1
9. THOUGHTS THAT YOU WOULD BE BETTER OFF DEAD, OR OF HURTING YOURSELF: 0
8. MOVING OR SPEAKING SO SLOWLY THAT OTHER PEOPLE COULD HAVE NOTICED. OR THE OPPOSITE, BEING SO FIGETY OR RESTLESS THAT YOU HAVE BEEN MOVING AROUND A LOT MORE THAN USUAL: 3
SUM OF ALL RESPONSES TO PHQ QUESTIONS 1-9: 8
10. IF YOU CHECKED OFF ANY PROBLEMS, HOW DIFFICULT HAVE THESE PROBLEMS MADE IT FOR YOU TO DO YOUR WORK, TAKE CARE OF THINGS AT HOME, OR GET ALONG WITH OTHER PEOPLE: 2
7. TROUBLE CONCENTRATING ON THINGS, SUCH AS READING THE NEWSPAPER OR WATCHING TELEVISION: 3

## 2024-01-26 ASSESSMENT — ENCOUNTER SYMPTOMS
SORE THROAT: 0
COUGH: 0
ABDOMINAL PAIN: 0
NAUSEA: 0
WHEEZING: 0
VOMITING: 0
CONSTIPATION: 0
EYE PAIN: 0
DIARRHEA: 0
SHORTNESS OF BREATH: 0

## 2024-01-26 NOTE — PROGRESS NOTES
Attending Physician Statement  I  have discussed the care of Mina Gill including pertinent history and exam findings with the resident. I agree with the assessment, plan and orders as documented by the resident.      /64 (Site: Left Upper Arm, Position: Sitting, Cuff Size: Medium Adult)   Pulse 85   Temp 96.8 °F (36 °C) (Oral)   Ht 1.702 m (5' 7.01\")   Wt 84.1 kg (185 lb 6.4 oz)   SpO2 95%   BMI 29.03 kg/m²    BP Readings from Last 3 Encounters:   01/26/24 119/64   12/15/23 132/80   11/16/23 115/68     Wt Readings from Last 3 Encounters:   01/26/24 84.1 kg (185 lb 6.4 oz)   01/11/24 86.2 kg (190 lb)   12/15/23 86.4 kg (190 lb 6.4 oz)          Diagnosis Orders   1. Atypical chest pain        2. Essential hypertension          Chest pain is stable at this time, denies ongoing episodes during visit.  Reviewed cardiology notes, possible reflux component related to the symptoms he is describing; GI follow-up, currently on Protonix 40 mg; will consider twice daily dosing and addition of H2 blocker-GI recommendations pending      Chris Love DO 1/26/2024 2:13 PM

## 2024-01-26 NOTE — CARE COORDINATION
HC met the patient and his  @ [facility] prior to his PCP appointment.  HC shared ACP information with the patient and his .  HC felt that  It was beneficial having a second pair of ears with sharing the ACP information.    Plan of Care  HC  will get back in touch with patient regarding a   conversation with he and his son.  HC will assist patient with future medical transportation

## 2024-01-26 NOTE — PATIENT INSTRUCTIONS
Thank you for letting us take care of you today. We hope all your questions were addressed. If a question was overlooked or something else comes to mind after you return home, please contact a member of your Care Team listed below.      Your Care Team at [facility] is Team #  Scott Bell, (Faculty)  Oswaldo Blount (Resident)  Tisha Glasgow (Resident)  Oswaldo Scott (Resident)   Gila Castillo (Resident)  Jamal Barnes (Resident)  Graciela Delatorre., [facility]  Nicolette Freeman., [facility]  Jacquelyn Starr, [facility]  Kaitlynn Toney, [facility]  Girma Fernandez, [facility]  Noemy Matias, [facility]  Bernadette [place], [facility]  Regina Kay, [facility]  Gokul (LJ) SALVATORE Ramirez (Clinical Practice Manager)  Mavis Ball [facility] (Clinical Pharmacist)     Office phone number: [phone]    If you need to get in right away due to illness, please be advised we have \"Same Day\" appointments available Monday-Friday. Please call us at [phone] option #3 to schedule your \"Same Day\" appointment.

## 2024-01-26 NOTE — PROGRESS NOTES
Mina Gill (:  1940) is a 83 y.o. male,Established patient, here for evaluation of the following chief complaint(s):  Hypertension, Chest Pain, and Health Maintenance (Declined medication refills at this time. Please discuss colonoscopy, was due back in 18 to repeat with Gastro provider, Dr. Romo. )    ASSESSMENT/PLAN:    1. Atypical chest pain  2. Essential hypertension    Discussed with patient that if chest pain recurs he will have to go to the ER.  Patient has care coordinator following as well the reminds him of his appointments.  Patient voiced understanding of the plan.    Will follow-up with patient once he is seen cardiology and GI.    Return in about 6 weeks (around 3/8/2024), or if symptoms worsen or fail to improve, for CAD.       Subjective     SUBJECTIVE/OBJECTIVE:    HPI    Mr. Mina Gill, 83-year-old male, with previous medical history of HTN, CAD with history of stent, DM type II (in pre-DM range), Parkinson disease, and stage IIIb CKD.    Presents to the [facility] office today for follow-up on HTN.    Blood pressure today is 119/64.  Pulse 85.  Patient does complain of chest pain and shortness of breath.  Not having any active chest pain right now.  Patient states that this chest pain has been ongoing for the past couple of years.  Comes and goes.  States for about 5 minutes.  States that whenever he does have this chest pain in the center of the chest he feels short of breath.  Episodes last about 5 minutes.  Patient does have a follow-up with cardiology on .  No loss of consciousness.  No fevers or chills.     Patient is not in any active chest pain at this time.    Patient does also have a history of esophagitis and this chest pain may be due to esophagitis as well.  Patient does have a follow-up with GI as well on the  of this month, in 3 days.    Other follow-ups that the patient has at this time include with pain management on  for bilateral

## 2024-01-29 ENCOUNTER — OFFICE VISIT (OUTPATIENT)
Dept: GASTROENTEROLOGY | Age: 84
End: 2024-01-29
Payer: MEDICARE

## 2024-01-29 VITALS
HEIGHT: 67 IN | BODY MASS INDEX: 29.76 KG/M2 | SYSTOLIC BLOOD PRESSURE: 117 MMHG | DIASTOLIC BLOOD PRESSURE: 74 MMHG | WEIGHT: 189.6 LBS | HEART RATE: 69 BPM

## 2024-01-29 DIAGNOSIS — R13.19 ESOPHAGEAL DYSPHAGIA: Primary | ICD-10-CM

## 2024-01-29 DIAGNOSIS — K21.00 GASTROESOPHAGEAL REFLUX DISEASE WITH ESOPHAGITIS WITHOUT HEMORRHAGE: ICD-10-CM

## 2024-01-29 PROCEDURE — 1123F ACP DISCUSS/DSCN MKR DOCD: CPT | Performed by: INTERNAL MEDICINE

## 2024-01-29 PROCEDURE — 3078F DIAST BP <80 MM HG: CPT | Performed by: INTERNAL MEDICINE

## 2024-01-29 PROCEDURE — 99213 OFFICE O/P EST LOW 20 MIN: CPT | Performed by: INTERNAL MEDICINE

## 2024-01-29 PROCEDURE — 3074F SYST BP LT 130 MM HG: CPT | Performed by: INTERNAL MEDICINE

## 2024-01-29 ASSESSMENT — ENCOUNTER SYMPTOMS
WHEEZING: 0
BLOOD IN STOOL: 0
TROUBLE SWALLOWING: 0
CHOKING: 0
CONSTIPATION: 0
DIARRHEA: 0
NAUSEA: 0
ABDOMINAL DISTENTION: 0
ANAL BLEEDING: 0
ABDOMINAL PAIN: 0
VOMITING: 0
COUGH: 0
SORE THROAT: 0
RECTAL PAIN: 0
SINUS PRESSURE: 0
VOICE CHANGE: 0
BACK PAIN: 0

## 2024-01-29 NOTE — PROGRESS NOTES
GI OFFICE FOLLOW UP    INTERVAL HISTORY:   Jamal Barnes MD  [address] [place] [address]    Chief Complaint   Patient presents with    Dysphagia     Patient is here today to be seen for a follow up on his dysphagia. Patient states that he isn't having trouble swallowing and that it's a lot better than it was. Patient states sometimes he gets a burning sensation in the upper abdomen.        1. Esophageal dysphagia    2. Gastroesophageal reflux disease with esophagitis without hemorrhage              HISTORY OF PRESENT ILLNESS:   Patient seen along with his neighbor who helps him regarding the care.    On further discussion patient denies symptoms.  He states his swallowing is better.  Denies heartburns.  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    He has a Parkinson's disease.    Denies abdominal pain bloating, nausea vomiting.  Bowel movements satisfactory.    Overall he is doing reasonably well.    Past Medical,Family, and Social History reviewed and does contribute to the patient presenting condition.      Patient's PMH/PSH,SH,PSYCH Hx, MEDs, ALLERGIES, and ROS were all reviewed and updated in the appropriate sections. 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

## 2024-01-30 ENCOUNTER — CARE COORDINATION (OUTPATIENT)
Dept: CARE COORDINATION | Age: 84
End: 2024-01-30

## 2024-01-30 NOTE — CARE COORDINATION
Patient called and left a message for the HC stating that he was told of an   appointment on 02/07/24, and will need transportation.  HC attempted to   contact the patient and found that his mailbox is full.  HC was unable to   leave a message due today.    Plan of Care  HC will attempt to reach the patient at another time

## 2024-02-01 ENCOUNTER — CARE COORDINATION (OUTPATIENT)
Dept: CARE COORDINATION | Age: 84
End: 2024-02-01

## 2024-02-01 NOTE — CARE COORDINATION
HC attempted to contact the patient, there was no answer.  Patient's mailbox is full.  HC was prepared to inform the patient  of his upcoming appointments on Wed., 02/07/24, @ 3:30p,  patient will be picked up @ 2:30p, by [facility] [employer].      Plan of Care  HC will attempt to reach out to patient by phone again

## 2024-02-01 NOTE — CARE COORDINATION
Ambulatory Care Coordination Note  2024    Patient Current Location:  Home: [address] [place] [address]  [place]  [place] [address]  [place] [address]     ACM contacted the patient by telephone. Verified name and  with patient as identifiers. Provided introduction to self, and explanation of the ACM role.     Challenges to be reviewed by the provider   Additional needs identified to be addressed with provider: No  none               Method of communication with provider: staff message.    ACM: Robin Perry RN    CC Plan:       Review medications with Patient     2.  Review upcoming appointments with Patient.    Future Appointments   Date Time Provider Department Center   2024  4:30 PM Chuy Bailey MD [facility] [place]   2024  9:30 AM Lobito Tracy MD [facility] [place] [facility]   2024  1:00 PM Slime Baker APRN - CNP [facility]     3.  Patient kept new patient appointment with Dr. Garcia on 2024 for esophagitis and dysphagia.  Review his plan of care with Patient.      4.  Patient kept appointment with Dr. Bailey, Pain Management on 2024 for left knee pain.  His plan of care was reviewed.  He plans to schedule Patient for ultrasound-guided knee Synvisc injection.  Appointment scheduled 2024.     Offered patient enrollment in the Remote Patient Monitoring (RPM) program for in-home monitoring: Patient is not eligible for RPM program.    Phoned Patient for ACM update and to discuss cc plan of care.  Patient was playing a game of [employer]Pad with his Dana's daughter., Raul.  They were on a speaker phone.  Patient complains of aching all over.  He states he has a bad knee.   He was reminded he has an appointment next week with Dr. Bailey, Pain management provider.  He questions if he will have transportation.  He missed his call from [employer] today.  Raul questions the address for the appointment.    Writer phoned Dr. Bailey's office in order to get address for scheduled

## 2024-02-07 ENCOUNTER — HOSPITAL ENCOUNTER (OUTPATIENT)
Dept: PAIN MANAGEMENT | Facility: CLINIC | Age: 84
Discharge: HOME | End: 2024-02-07
Payer: MEDICARE

## 2024-02-07 ENCOUNTER — CARE COORDINATION (OUTPATIENT)
Dept: CARE COORDINATION | Age: 84
End: 2024-02-07

## 2024-02-07 VITALS
RESPIRATION RATE: 16 BRPM | BODY MASS INDEX: 29.66 KG/M2 | HEART RATE: 56 BPM | OXYGEN SATURATION: 97 % | WEIGHT: 189 LBS | SYSTOLIC BLOOD PRESSURE: 137 MMHG | DIASTOLIC BLOOD PRESSURE: 77 MMHG | HEIGHT: 67 IN | TEMPERATURE: 97 F

## 2024-02-07 DIAGNOSIS — M17.12 PRIMARY OSTEOARTHRITIS OF LEFT KNEE: Primary | ICD-10-CM

## 2024-02-07 DIAGNOSIS — R52 PAIN MANAGEMENT: ICD-10-CM

## 2024-02-07 PROCEDURE — 2500000003 HC RX 250 WO HCPCS: Performed by: ANESTHESIOLOGY

## 2024-02-07 PROCEDURE — A9579 GAD-BASE MR CONTRAST NOS,1ML: HCPCS | Performed by: ANESTHESIOLOGY

## 2024-02-07 PROCEDURE — 6360000004 HC RX CONTRAST MEDICATION: Performed by: ANESTHESIOLOGY

## 2024-02-07 PROCEDURE — 77002 NEEDLE LOCALIZATION BY XRAY: CPT

## 2024-02-07 PROCEDURE — 6360000002 HC RX W HCPCS: Performed by: ANESTHESIOLOGY

## 2024-02-07 PROCEDURE — 77002 NEEDLE LOCALIZATION BY XRAY: CPT | Performed by: ANESTHESIOLOGY

## 2024-02-07 PROCEDURE — 20610 DRAIN/INJ JOINT/BURSA W/O US: CPT | Performed by: ANESTHESIOLOGY

## 2024-02-07 PROCEDURE — 20610 DRAIN/INJ JOINT/BURSA W/O US: CPT

## 2024-02-07 RX ORDER — LIDOCAINE HYDROCHLORIDE 10 MG/ML
INJECTION, SOLUTION EPIDURAL; INFILTRATION; INTRACAUDAL; PERINEURAL
Status: COMPLETED | OUTPATIENT
Start: 2024-02-07 | End: 2024-02-07

## 2024-02-07 RX ADMIN — GADOTERIDOL 2 ML: 279.3 INJECTION, SOLUTION INTRAVENOUS at 16:04

## 2024-02-07 RX ADMIN — Medication 16 MG: at 16:06

## 2024-02-07 RX ADMIN — LIDOCAINE HYDROCHLORIDE 5 ML: 10 INJECTION, SOLUTION EPIDURAL; INFILTRATION; INTRACAUDAL at 16:04

## 2024-02-07 ASSESSMENT — PAIN - FUNCTIONAL ASSESSMENT
PAIN_FUNCTIONAL_ASSESSMENT: 0-10
PAIN_FUNCTIONAL_ASSESSMENT: PREVENTS OR INTERFERES WITH ALL ACTIVE AND SOME PASSIVE ACTIVITIES

## 2024-02-07 ASSESSMENT — PAIN DESCRIPTION - DESCRIPTORS: DESCRIPTORS: ACHING

## 2024-02-07 ASSESSMENT — PAIN SCALES - GENERAL: PAINLEVEL_OUTOF10: 4

## 2024-02-07 NOTE — CARE COORDINATION
Patient called today to follow up on his  today for his   Appointment in [place], [place].  HC reminded the patient that  His cab should come at or around 2:30p, since his appointment  Is scheduled for 3:30p.  Patient stated an understanding.     Plan of Care  HC will call patient for 02/12/24 appointment

## 2024-02-07 NOTE — DISCHARGE INSTRUCTIONS
Discharge Instructions following Sedation or Anesthesia:  You have  received  a sedative/anesthetic therefore, you should not consume any alcoholic beverages for minimum of 12 hours.  Do not drive or operate machinery for 24 hours.  Do not sign legal documents for 24 hours.  Dizziness, drowsiness, and unsteadiness may occur.  Rest when need to.  Increase diet as tolerated.  Keep up on fluids if diet allows.      General Instructions:  Do not take a tub bath for 72 hours after procedure (this includes hot tubs and swimming pools).  You may shower, but avoid hot water to injection site.   Avoid strenuous activity TODAY especially if you experience dizziness.   Remove band-aid the next day.  Wash off any residual iodine   Do not use heat, heating pad, or any other heating device over the injection site for 3 days after the procedure.  If you experience pain after your procedure, you may continue with your current pain medication as prescribed.  (DO NOT INCREASE YOUR PAIN MEDICATION WITHOUT TALKING TO DOCTOR)  Soreness and pain at injection site is common, may use ice to reduce soreness.    What To Expect After A Steroid Injection  You should start to feel the effects of the steroid injection in about 48-72 hours  You may experience facial flushing, night sweats and irritability.  Other common steroid related side effects are increased appetite, mood elevation, insomnia and fluid retention.  These effects usually subside in a few days.  Steroids used in epidural injections may cause muscle spasms for a few days.  If you are diabetic, your blood sugar may be elevated after your procedure due to the steroids.  You will need to monitor your blood sugar more closely while going through a series of injections (Check blood sugar at meals and bedtime for 5 days).  You may require adjustment in your diabetic medications, contact your PCP office to discuss.    Call [facility] at [phone] if you experience:   Fever,

## 2024-02-07 NOTE — INTERVAL H&P NOTE
Update History & Physical    The patient's History and Physical of January 11, 2024 was reviewed with the patient and I examined the patient. There was no change. The surgical site was confirmed by the patient and me.     Plan: The risks, benefits, expected outcome, and alternative to the recommended procedure have been discussed with the patient. Patient understands and wants to proceed with the procedure.     ASA 3  MP 3    Electronically signed by Chuy Bailey MD on 2/7/2024 at 3:49 PM

## 2024-02-07 NOTE — OP NOTE
Preoperative Diagnosis: Osteoarthritis of the Left knee.      Postoperative Diagnosis: Osteoarthritis of the Left knee.    Procedure Performed:  Injection of Left knee with floroscopy Guidance. SYNVISC ONE    Indication for the Procedure:  The patient has Left knee pain not responding to conservative treatment diagnosed with Knee OA  The procedure and the risks were discussed with the patient and an informed consent was obtained.    Procedure:  The patient was placed in supine position. Skin over the Left knee was prepped with chlor prep and draped in sterile manner.  Using fluoroscopy Guidance , skin entry point was marked along the joint margin..    Then skin and deep tissues were infiltrated with 1  ml of 1% lidocaine. A 22-gauge  spinal needle was advanced with floro in to joint pace.  Aspiration was -ve.  Small amount of contrast dye non- iodinated contrast PROHANS was injected that showed intraarticular spread.  Finally 6 ml of treatment solution SYNVISC ONE was injected into the joint.   The needle was removed and a Band-Aid was placed over the needle insertion site.

## 2024-02-09 ENCOUNTER — CARE COORDINATION (OUTPATIENT)
Dept: CARE COORDINATION | Age: 84
End: 2024-02-09

## 2024-02-09 NOTE — CARE COORDINATION
Patient called the HC inquiring about his appointment on 02/12/24.  HC provided the   Patient with the information for his appointment, once again.    Plan of Care  HC will follow up with patient regarding nex appts and transportation.

## 2024-02-09 NOTE — CARE COORDINATION
HC left patient an appointment reminder for Monday, 02/12/24, informing patient of his appointment going to [address] [facility].  There was no answer,  HC left her contact  information for a return call.    Plan of Care  HC will remind patient again, if he calls

## 2024-02-12 ENCOUNTER — HOSPITAL ENCOUNTER (OUTPATIENT)
Age: 84
Discharge: HOME | End: 2024-02-14

## 2024-02-12 ENCOUNTER — OFFICE VISIT (OUTPATIENT)
Dept: NEUROSURGERY | Age: 84
End: 2024-02-12
Payer: MEDICARE

## 2024-02-12 ENCOUNTER — HOSPITAL ENCOUNTER (OUTPATIENT)
Dept: GENERAL RADIOLOGY | Age: 84
Discharge: HOME | End: 2024-02-14

## 2024-02-12 ENCOUNTER — CARE COORDINATION (OUTPATIENT)
Dept: CARE COORDINATION | Age: 84
End: 2024-02-12

## 2024-02-12 VITALS
WEIGHT: 191.8 LBS | HEIGHT: 67 IN | SYSTOLIC BLOOD PRESSURE: 135 MMHG | HEART RATE: 91 BPM | BODY MASS INDEX: 30.1 KG/M2 | DIASTOLIC BLOOD PRESSURE: 77 MMHG

## 2024-02-12 DIAGNOSIS — M47.816 LUMBAR SPONDYLOSIS: Primary | ICD-10-CM

## 2024-02-12 DIAGNOSIS — M47.812 CERVICAL SPONDYLOSIS: ICD-10-CM

## 2024-02-12 DIAGNOSIS — M47.816 LUMBAR SPONDYLOSIS: ICD-10-CM

## 2024-02-12 PROCEDURE — 72110 X-RAY EXAM L-2 SPINE 4/>VWS: CPT

## 2024-02-12 PROCEDURE — 3075F SYST BP GE 130 - 139MM HG: CPT | Performed by: NURSE PRACTITIONER

## 2024-02-12 PROCEDURE — 3078F DIAST BP <80 MM HG: CPT | Performed by: NURSE PRACTITIONER

## 2024-02-12 PROCEDURE — 99204 OFFICE O/P NEW MOD 45 MIN: CPT | Performed by: NURSE PRACTITIONER

## 2024-02-12 PROCEDURE — 1123F ACP DISCUSS/DSCN MKR DOCD: CPT | Performed by: NURSE PRACTITIONER

## 2024-02-12 NOTE — PROGRESS NOTES
30.04 kg/m²   Estimated body mass index is 30.04 kg/m² as calculated from the following:    Height as of this encounter: 1.702 m (5' 7\").    Weight as of this encounter: 87 kg (191 lb 12.8 oz).    General:  Mina Gill is a 83 y.o. year old male who appears his stated age.   HEENT: Normocephalic atraumatic. Neck supple.  Chest: regular rate; pulses equal  Abdomen: Soft nontender nondistended.  Ext: DP and PT pulses 2+, good cap refill  Neuro    Mentation  Appropriate affect  Registration intact  Orientation intact  Judgment intact to situation    Cranial Nerves:   Pupils equal and reactive to light  Extraocular motion intact  Face and shrug symmetric  Tongue midline  No dysarthria  v1-3 sensation symmetric, masseter tone symmetric  Hearing symmetric    Sensation: Diminished bilateral fingertips    Motor  L deltoid 5/5; R deltoid 5/5  L biceps 5/5; R biceps 5/5  L triceps 5/5; R triceps 5/5  L wrist extension 5/5; R wrist extension 5/5  L intrinsics 5/5; R intrinsics 5/5     L hip flexion 5/5 , R hip flexion 5/5  L knee extension 5/5; R knee extension 5/5  L Dorsiflexion 5/5; R dorsiflexion 5/5  L Plantarflexion 5/5; R plantarflexion 5/5  L EHL 5/5; R EHL 4+/5    Reflexes  L Brachioradialis 2+/4; R brachioradialis 2+/4  L Biceps 2+/4; R Biceps 2+/4  L Triceps 2+/4; R Triceps 2+/4  L Patellar 2+/4: R Patellar 2+/4  L Achilles 2+/4; R Achilles 2+/4    hoffmans L: neg  hoffmans R: neg  Clonus L: neg  Clonus R: neg  Babinski L: neg  Babinski R: neg    Studies Review:     X-ray lumbar 5/17/2023:  FINDINGS:  No acute fractures.  Vertebral body heights are maintained.  No suspicious  focal bony lesions are seen.  No traumatic malalignment.     Multilevel degenerative disc disease and facet osteoarthritis, not  appreciably changed from prior CT exam.  Stable alignment with multilevel  grade 1 spondylolisthesis to include: Retrolisthesis of L1 on L2,  retrolisthesis of L2 on L3, retrolisthesis of L3 on L4 and

## 2024-02-12 NOTE — CARE COORDINATION
Patient had his assist call and ask details for the provider that he was to see for his appointment today @ [address] [facility].  HC informed the assistant that she didn't have the providers name but patient is to go to [facility].  Assistant  thanked the HC.  A call came in from the office where the patient was stating that he was ready for his cab.  The individual then stated that the patient had one more Xray to take.   provided the person with the phone number for [employer].    Plan of Care  HC will follow up with patient for other social needs.

## 2024-02-13 ENCOUNTER — CARE COORDINATION (OUTPATIENT)
Dept: CARE COORDINATION | Age: 84
End: 2024-02-13

## 2024-02-13 NOTE — CARE COORDINATION
MD Neuro [facility] Neurology -   5/6/2024 11:00 AM Slime Baker, JW - CNP [facility]

## 2024-02-19 ENCOUNTER — CARE COORDINATION (OUTPATIENT)
Dept: CARE COORDINATION | Age: 84
End: 2024-02-19

## 2024-02-19 NOTE — CARE COORDINATION
HC attempted to contact the patient, there was no answer.  Patient's voicemail didn't appear to be working.  HC will follow up with patient at another time regarding his upcoming appointment on 03/08/23 @ [facility] @ 10:30a.    Patient returned the call and stated that he noticed that the HC had called.  HC mentioned to the patient that he has an appointment on 03/08/24, @ 10:30a at [facility] @ [address]  Cab transportation has been made and the patient will be picked up @ 9:30a.  This information will be shared with the patient on or around 03/01/24.      Plan of Care  HC will contact patient next week with his transportation arrangement for 03/08/24.

## 2024-02-26 ENCOUNTER — CARE COORDINATION (OUTPATIENT)
Dept: CARE COORDINATION | Age: 84
End: 2024-02-26

## 2024-02-26 NOTE — CARE COORDINATION
(Comment: Delfin Frausto, HC)  Transportation Support: Completed (Comment: Delfin Frausto, HC, assist with transportation arrangements for appointments)  Zone Management Tools: Completed          Goals Addressed    None         Future Appointments   Date Time Provider Department Center   3/8/2024 10:30 AM Jamal Barnes MD [facility]   4/17/2024  3:30 PM Concepcion Alcantara MD Neuro [facility] Neurology -   5/6/2024 11:00 AM Slime Baker, APRN - CNP [facility]

## 2024-02-27 ENCOUNTER — CARE COORDINATION (OUTPATIENT)
Dept: CARE COORDINATION | Age: 84
End: 2024-02-27

## 2024-02-27 NOTE — CARE COORDINATION
update and to discuss cc plan of care.  Patient states he woke up to sharp chest pain in the center of his chest about two nights ago.  He states it was around 2 ;30 am.  Patient states his chest pain comes and goes.  He states it's mild today.  Writer recommend Patient goes to ED.  He was informed he should call 911 if he has sharp chest pain.  He was informed he could be having a \"heart attack\".  Patient states he had bad luck last time he called 911.  He states they ran test on me and told me I was all right and I have to get a ride to the hospital.  Patient states he told his son, Praneeth, about his chest pain.  He states he'll think about going to the [facility] ED.   He was strongly encouraged to go to ED. Writer will notify Dr. Lobito Tracy and Columba Melo RN.    Patient has upcoming Stress test on 3/11/24 and ECHO on 3/18/2024.      Lab Results       None            Care Coordination Interventions    Referral from Primary Care Provider: Yes  Suggested Interventions and Community Resources  Fall Risk Prevention: In Process  Disease Specific Clinic: Completed (Comment: Dr. Ross Garcia, GI;  Dr. Chuy Bailey, Pain Mangement; Dr. Lobito Tracy, Cardiologist; Slime Baker, [facility])  Home Care Waiver: Completed  Home Health Services: Completed (Comment: Elba from [facility] visits Patient weekly)  Meals on Wheels: Completed  Physical Therapy: Completed (Comment: Patient receives home Physical Therapy)  Social Work: Completed (Comment: [facility])  Transportation Support: Completed (Comment: [facility], assist with transportation arrangements for appointments)  Zone Management Tools: Completed          Goals Addressed    None         Future Appointments   Date Time Provider Department Center   3/8/2024 10:30 AM [facility]al [facility]   3/11/2024 12:30 PM SCHEDULE, [facility] [place] [facility]   3/18/2024  1:45 PM SCHEDULE, [facility]

## 2024-02-27 NOTE — CARE COORDINATION
Patient called and left a message for the HC, stating that he wanted to know what he has to do @ this point, about the ACP.  Patient requested a call back.  HC phoned the patient and attempted to explain to him the process of the ACP, and he keeps stating that he wants what's best to take place.  HC explained to the patient that he needs his son to call the HC along with the patient for a conversation.  Patient stated that he understands.  Patient informed the HC that he needs to speak with his nurse, due to have experienced a sharp pain in his chest.  HC left a message for Robin Perry/AMBER, and asked that she would get with the patient.    Plan of Care  HC will follow up with patient regarding his pain.

## 2024-02-28 RX ORDER — AMLODIPINE BESYLATE 5 MG/1
5 TABLET ORAL DAILY
Qty: 90 TABLET | Refills: 8 | OUTPATIENT
Start: 2024-02-28

## 2024-02-28 RX ORDER — GABAPENTIN 100 MG/1
CAPSULE ORAL
Qty: 84 CAPSULE | Refills: 5 | OUTPATIENT
Start: 2024-02-28

## 2024-02-28 RX ORDER — SUCRALFATE 1 G/1
TABLET ORAL
Qty: 120 TABLET | Refills: 8 | OUTPATIENT
Start: 2024-02-28

## 2024-02-28 NOTE — TELEPHONE ENCOUNTER
Last visit: 1/26/24  Last Med refill: 12/19/23  Does patient have enough medication for 72 hours: no    Next Visit Date:  Future Appointments   Date Time Provider Department Center   3/8/2024 10:30 AM Jamal [facility]   3/11/2024 12:30 PM SCHEDULE, [facility] [place] [facility]   3/18/2024  1:45 PM SCHEDULE, [facility] [place] E[facility]   4/17/2024  3:30 PM Concepcion Alcantara MD Neuro [facility] Neurology -   5/6/2024 11:00 AM Slime Baker, APRN - CNP [facility]       Health Maintenance   Topic Date Due    Shingles vaccine (1 of 2) Never done    Respiratory Syncytial Virus (RSV) Pregnant or age 60 yrs+ (1 - 1-dose 60+ series) Never done    Colorectal Cancer Screen  09/19/2018    COVID-19 Vaccine (6 - 2023-24 season) 09/01/2023    Hepatitis B vaccine (2 of 3 - 19+ 3-dose series) 12/14/2023    Annual Wellness Visit (Medicare Advantage)  01/01/2024    Lipids  12/15/2024    Depression Monitoring  01/26/2025    DTaP/Tdap/Td vaccine (2 - Td or Tdap) 11/16/2033    Flu vaccine  Completed    Pneumococcal 65+ years Vaccine  Completed    Hepatitis A vaccine  Aged Out    Hib vaccine  Aged Out    Polio vaccine  Aged Out    Meningococcal (ACWY) vaccine  Aged Out       Hemoglobin A1C (%)   Date Value   11/06/2023 6.4 (H)   06/08/2022 6.4 (H)   10/04/2021 6.0             ( goal A1C is < 7)   No components found for: \"LABMICR\"  LDL Cholesterol (mg/dL)   Date Value   12/15/2023 16   06/18/2022 47     LDL Calculated (mg/dL)   Date Value   01/08/2018 58       (goal LDL is <100)   AST (U/L)   Date Value   06/14/2023 23     ALT (U/L)   Date Value   06/14/2023 13     BUN (mg/dL)   Date Value   09/06/2023 16     BP Readings from Last 3 Encounters:   02/12/24 135/77   02/09/24 110/68   02/07/24 137/77          (goal 120/80)    All Future Testing planned in [facility]  Lab Frequency Next Occurrence   Basic Metabolic Panel Once 07/19/2023   CBC with Auto Differential Once

## 2024-02-29 NOTE — TELEPHONE ENCOUNTER
Left message on Robin RN'S VM and attempted to contact pt.  Left message on pts VM re Dr. Tracy's ER order.

## 2024-03-01 ENCOUNTER — CARE COORDINATION (OUTPATIENT)
Dept: CARE COORDINATION | Age: 84
End: 2024-03-01

## 2024-03-01 NOTE — CARE COORDINATION
HC phoned the patient to remind of his appointment on Friday, 03/08/24 @ 10:30a,   @ [facility], his cab is scheduled to pick him up @ 9:30a.  This information was shared with the patient, patient stated an understanding.    Plan of Care  HC will follow up with patient regarding travel plans on 03/06/24.

## 2024-03-01 NOTE — CARE COORDINATION
HC contacted the patient to confirm his transportation arrangements for his appointment on 03/08/24 for his PCP appt.

## 2024-03-04 RX ORDER — SUCRALFATE 1 G/1
TABLET ORAL
Qty: 120 TABLET | Refills: 2 | Status: SHIPPED | OUTPATIENT
Start: 2024-03-04

## 2024-03-04 NOTE — TELEPHONE ENCOUNTER
Last visit: 01/26/2024  Last Med refill: 12/19/2023  Does patient have enough medication for 72 hours: No:     Next Visit Date:  Future Appointments   Date Time Provider Department Center   3/8/2024 10:30 AM Jamal [facility]   3/11/2024 12:30 PM SCHEDULE, [facility] [place] [facility]   3/18/2024  1:45 PM SCHEDULE, [facility] [place] E[facility]   4/17/2024  3:30 PM Concepcion Alcantara MD Neuro [facility] Neurology -   5/6/2024 11:00 AM Slime Baker, APRN - CNP [facility]       Health Maintenance   Topic Date Due    Shingles vaccine (1 of 2) Never done    Respiratory Syncytial Virus (RSV) Pregnant or age 60 yrs+ (1 - 1-dose 60+ series) Never done    Colorectal Cancer Screen  09/19/2018    COVID-19 Vaccine (6 - 2023-24 season) 09/01/2023    Hepatitis B vaccine (2 of 3 - 19+ 3-dose series) 12/14/2023    Annual Wellness Visit (Medicare Advantage)  01/01/2024    Lipids  12/15/2024    Depression Monitoring  01/26/2025    DTaP/Tdap/Td vaccine (2 - Td or Tdap) 11/16/2033    Flu vaccine  Completed    Pneumococcal 65+ years Vaccine  Completed    Hepatitis A vaccine  Aged Out    Hib vaccine  Aged Out    Polio vaccine  Aged Out    Meningococcal (ACWY) vaccine  Aged Out       Hemoglobin A1C (%)   Date Value   11/06/2023 6.4 (H)   06/08/2022 6.4 (H)   10/04/2021 6.0             ( goal A1C is < 7)   No components found for: \"LABMICR\"  LDL Cholesterol (mg/dL)   Date Value   12/15/2023 16   06/18/2022 47     LDL Calculated (mg/dL)   Date Value   01/08/2018 58       (goal LDL is <100)   AST (U/L)   Date Value   06/14/2023 23     ALT (U/L)   Date Value   06/14/2023 13     BUN (mg/dL)   Date Value   09/06/2023 16     BP Readings from Last 3 Encounters:   02/12/24 135/77   02/09/24 110/68   02/07/24 137/77          (goal 120/80)    All Future Testing planned in [facility]  Lab Frequency Next Occurrence   Basic Metabolic Panel Once 07/19/2023   CBC with Auto Differential Once

## 2024-03-06 ENCOUNTER — APPOINTMENT (OUTPATIENT)
Dept: GENERAL RADIOLOGY | Age: 84
End: 2024-03-06
Payer: MEDICARE

## 2024-03-06 ENCOUNTER — HOSPITAL ENCOUNTER (OUTPATIENT)
Age: 84
Setting detail: OBSERVATION
Discharge: HOME | End: 2024-03-07
Attending: EMERGENCY MEDICINE | Admitting: EMERGENCY MEDICINE
Payer: MEDICARE

## 2024-03-06 DIAGNOSIS — R07.9 CHEST PAIN, UNSPECIFIED TYPE: Primary | ICD-10-CM

## 2024-03-06 LAB
ANION GAP SERPL CALCULATED.3IONS-SCNC: 12 MMOL/L (ref 9–16)
BNP SERPL-MCNC: 252 PG/ML (ref 0–300)
BUN SERPL-MCNC: 15 MG/DL (ref 8–23)
CALCIUM SERPL-MCNC: 9.3 MG/DL (ref 8.6–10.4)
CHLORIDE SERPL-SCNC: 102 MMOL/L (ref 98–107)
CO2 SERPL-SCNC: 23 MMOL/L (ref 20–31)
CREAT SERPL-MCNC: 1.4 MG/DL (ref 0.7–1.2)
ERYTHROCYTE [DISTWIDTH] IN BLOOD BY AUTOMATED COUNT: 14 % (ref 11.8–14.4)
GFR SERPL CREATININE-BSD FRML MDRD: 48 ML/MIN/1.73M2
GLUCOSE SERPL-MCNC: 151 MG/DL (ref 74–99)
HCT VFR BLD AUTO: 44.1 % (ref 40.7–50.3)
HGB BLD-MCNC: 14.8 G/DL (ref 13–17)
MCH RBC QN AUTO: 32.6 PG (ref 25.2–33.5)
MCHC RBC AUTO-ENTMCNC: 33.6 G/DL (ref 28.4–34.8)
MCV RBC AUTO: 97.1 FL (ref 82.6–102.9)
NRBC BLD-RTO: 0 PER 100 WBC
PLATELET # BLD AUTO: 197 K/UL (ref 138–453)
PMV BLD AUTO: 9.1 FL (ref 8.1–13.5)
POTASSIUM SERPL-SCNC: 4.1 MMOL/L (ref 3.7–5.3)
RBC # BLD AUTO: 4.54 M/UL (ref 4.21–5.77)
SODIUM SERPL-SCNC: 137 MMOL/L (ref 136–145)
TROPONIN I SERPL HS-MCNC: 31 NG/L (ref 0–22)
TROPONIN I SERPL HS-MCNC: 32 NG/L (ref 0–22)
WBC OTHER # BLD: 5.5 K/UL (ref 3.5–11.3)

## 2024-03-06 PROCEDURE — 6370000000 HC RX 637 (ALT 250 FOR IP): Performed by: STUDENT IN AN ORGANIZED HEALTH CARE EDUCATION/TRAINING PROGRAM

## 2024-03-06 PROCEDURE — 80048 BASIC METABOLIC PNL TOTAL CA: CPT

## 2024-03-06 PROCEDURE — 99285 EMERGENCY DEPT VISIT HI MDM: CPT

## 2024-03-06 PROCEDURE — G0378 HOSPITAL OBSERVATION PER HR: HCPCS

## 2024-03-06 PROCEDURE — 93005 ELECTROCARDIOGRAM TRACING: CPT | Performed by: STUDENT IN AN ORGANIZED HEALTH CARE EDUCATION/TRAINING PROGRAM

## 2024-03-06 PROCEDURE — 71046 X-RAY EXAM CHEST 2 VIEWS: CPT

## 2024-03-06 PROCEDURE — 83880 ASSAY OF NATRIURETIC PEPTIDE: CPT

## 2024-03-06 PROCEDURE — 85027 COMPLETE CBC AUTOMATED: CPT

## 2024-03-06 PROCEDURE — 84484 ASSAY OF TROPONIN QUANT: CPT

## 2024-03-06 PROCEDURE — 2580000003 HC RX 258: Performed by: STUDENT IN AN ORGANIZED HEALTH CARE EDUCATION/TRAINING PROGRAM

## 2024-03-06 RX ORDER — POTASSIUM CHLORIDE 7.45 MG/ML
10 INJECTION INTRAVENOUS PRN
Status: DISCONTINUED | OUTPATIENT
Start: 2024-03-06 | End: 2024-03-07 | Stop reason: HOSPADM

## 2024-03-06 RX ORDER — ENTACAPONE 200 MG/1
200 TABLET ORAL 4 TIMES DAILY
Status: DISCONTINUED | OUTPATIENT
Start: 2024-03-06 | End: 2024-03-07 | Stop reason: HOSPADM

## 2024-03-06 RX ORDER — ACETAMINOPHEN 325 MG/1
650 TABLET ORAL EVERY 6 HOURS PRN
Status: DISCONTINUED | OUTPATIENT
Start: 2024-03-06 | End: 2024-03-07 | Stop reason: HOSPADM

## 2024-03-06 RX ORDER — PANTOPRAZOLE SODIUM 40 MG/1
40 TABLET, DELAYED RELEASE ORAL
Status: DISCONTINUED | OUTPATIENT
Start: 2024-03-06 | End: 2024-03-07 | Stop reason: HOSPADM

## 2024-03-06 RX ORDER — ACETAMINOPHEN 650 MG/1
650 SUPPOSITORY RECTAL EVERY 6 HOURS PRN
Status: DISCONTINUED | OUTPATIENT
Start: 2024-03-06 | End: 2024-03-07 | Stop reason: HOSPADM

## 2024-03-06 RX ORDER — ROPINIROLE 0.25 MG/1
0.5 TABLET, FILM COATED ORAL 3 TIMES DAILY
Status: DISCONTINUED | OUTPATIENT
Start: 2024-03-06 | End: 2024-03-07 | Stop reason: HOSPADM

## 2024-03-06 RX ORDER — GABAPENTIN 100 MG/1
100 CAPSULE ORAL NIGHTLY
Status: DISCONTINUED | OUTPATIENT
Start: 2024-03-06 | End: 2024-03-07 | Stop reason: HOSPADM

## 2024-03-06 RX ORDER — CLOPIDOGREL BISULFATE 75 MG/1
75 TABLET ORAL DAILY
Status: DISCONTINUED | OUTPATIENT
Start: 2024-03-06 | End: 2024-03-07 | Stop reason: HOSPADM

## 2024-03-06 RX ORDER — SODIUM CHLORIDE 0.9 % (FLUSH) 0.9 %
5-40 SYRINGE (ML) INJECTION EVERY 12 HOURS SCHEDULED
Status: DISCONTINUED | OUTPATIENT
Start: 2024-03-06 | End: 2024-03-07 | Stop reason: HOSPADM

## 2024-03-06 RX ORDER — AMITRIPTYLINE HYDROCHLORIDE 10 MG/1
10 TABLET, FILM COATED ORAL NIGHTLY PRN
Status: DISCONTINUED | OUTPATIENT
Start: 2024-03-06 | End: 2024-03-07 | Stop reason: HOSPADM

## 2024-03-06 RX ORDER — POLYETHYLENE GLYCOL 3350 17 G/17G
17 POWDER, FOR SOLUTION ORAL DAILY PRN
Status: DISCONTINUED | OUTPATIENT
Start: 2024-03-06 | End: 2024-03-07 | Stop reason: HOSPADM

## 2024-03-06 RX ORDER — ISOSORBIDE MONONITRATE 30 MG/1
30 TABLET, EXTENDED RELEASE ORAL DAILY
Status: DISCONTINUED | OUTPATIENT
Start: 2024-03-06 | End: 2024-03-07 | Stop reason: HOSPADM

## 2024-03-06 RX ORDER — AMLODIPINE BESYLATE 5 MG/1
5 TABLET ORAL DAILY
Status: DISCONTINUED | OUTPATIENT
Start: 2024-03-06 | End: 2024-03-07 | Stop reason: HOSPADM

## 2024-03-06 RX ORDER — TAMSULOSIN HYDROCHLORIDE 0.4 MG/1
0.4 CAPSULE ORAL DAILY
Status: DISCONTINUED | OUTPATIENT
Start: 2024-03-06 | End: 2024-03-07 | Stop reason: HOSPADM

## 2024-03-06 RX ORDER — ONDANSETRON 2 MG/ML
4 INJECTION INTRAMUSCULAR; INTRAVENOUS EVERY 4 HOURS PRN
Status: DISCONTINUED | OUTPATIENT
Start: 2024-03-06 | End: 2024-03-07 | Stop reason: HOSPADM

## 2024-03-06 RX ORDER — POTASSIUM CHLORIDE 20 MEQ/1
40 TABLET, EXTENDED RELEASE ORAL PRN
Status: DISCONTINUED | OUTPATIENT
Start: 2024-03-06 | End: 2024-03-07 | Stop reason: HOSPADM

## 2024-03-06 RX ORDER — ATORVASTATIN CALCIUM 40 MG/1
40 TABLET, FILM COATED ORAL DAILY
Status: DISCONTINUED | OUTPATIENT
Start: 2024-03-06 | End: 2024-03-07 | Stop reason: HOSPADM

## 2024-03-06 RX ORDER — SODIUM CHLORIDE 0.9 % (FLUSH) 0.9 %
5-40 SYRINGE (ML) INJECTION PRN
Status: DISCONTINUED | OUTPATIENT
Start: 2024-03-06 | End: 2024-03-07 | Stop reason: HOSPADM

## 2024-03-06 RX ORDER — SUCRALFATE 1 G/1
1 TABLET ORAL EVERY 6 HOURS SCHEDULED
Status: DISCONTINUED | OUTPATIENT
Start: 2024-03-06 | End: 2024-03-07 | Stop reason: HOSPADM

## 2024-03-06 RX ORDER — ALLOPURINOL 100 MG/1
100 TABLET ORAL DAILY
Status: DISCONTINUED | OUTPATIENT
Start: 2024-03-06 | End: 2024-03-07 | Stop reason: HOSPADM

## 2024-03-06 RX ORDER — LANOLIN ALCOHOL/MO/W.PET/CERES
400 CREAM (GRAM) TOPICAL DAILY
Status: DISCONTINUED | OUTPATIENT
Start: 2024-03-06 | End: 2024-03-07 | Stop reason: HOSPADM

## 2024-03-06 RX ORDER — SODIUM CHLORIDE 9 MG/ML
INJECTION, SOLUTION INTRAVENOUS PRN
Status: DISCONTINUED | OUTPATIENT
Start: 2024-03-06 | End: 2024-03-07 | Stop reason: HOSPADM

## 2024-03-06 RX ADMIN — CARBIDOPA AND LEVODOPA 1 TABLET: 25; 100 TABLET ORAL at 17:43

## 2024-03-06 RX ADMIN — ACETAMINOPHEN 650 MG: 325 TABLET ORAL at 18:05

## 2024-03-06 RX ADMIN — ROPINIROLE HYDROCHLORIDE 0.5 MG: 0.25 TABLET, FILM COATED ORAL at 19:43

## 2024-03-06 RX ADMIN — GABAPENTIN 100 MG: 100 CAPSULE ORAL at 19:42

## 2024-03-06 RX ADMIN — ATORVASTATIN CALCIUM 40 MG: 40 TABLET, FILM COATED ORAL at 19:43

## 2024-03-06 RX ADMIN — ENTACAPONE 200 MG: 200 TABLET, FILM COATED ORAL at 19:43

## 2024-03-06 RX ADMIN — CARBIDOPA AND LEVODOPA 1 TABLET: 25; 100 TABLET ORAL at 19:42

## 2024-03-06 RX ADMIN — SODIUM CHLORIDE, PRESERVATIVE FREE 10 ML: 5 INJECTION INTRAVENOUS at 19:43

## 2024-03-06 RX ADMIN — SUCRALFATE 1 G: 1 TABLET ORAL at 17:46

## 2024-03-06 RX ADMIN — AMITRIPTYLINE HYDROCHLORIDE 10 MG: 10 TABLET, FILM COATED ORAL at 19:43

## 2024-03-06 RX ADMIN — ROPINIROLE HYDROCHLORIDE 0.5 MG: 0.25 TABLET, FILM COATED ORAL at 17:44

## 2024-03-06 RX ADMIN — ENTACAPONE 200 MG: 200 TABLET, FILM COATED ORAL at 17:43

## 2024-03-06 ASSESSMENT — PAIN DESCRIPTION - DESCRIPTORS: DESCRIPTORS: ACHING

## 2024-03-06 ASSESSMENT — PAIN DESCRIPTION - LOCATION
LOCATION: CHEST
LOCATION: CHEST

## 2024-03-06 ASSESSMENT — ENCOUNTER SYMPTOMS
ABDOMINAL PAIN: 0
SHORTNESS OF BREATH: 1
DIARRHEA: 0
COUGH: 0
BACK PAIN: 0
CONSTIPATION: 0

## 2024-03-06 ASSESSMENT — PAIN - FUNCTIONAL ASSESSMENT: PAIN_FUNCTIONAL_ASSESSMENT: 0-10

## 2024-03-06 ASSESSMENT — PAIN SCALES - GENERAL
PAINLEVEL_OUTOF10: 3
PAINLEVEL_OUTOF10: 3

## 2024-03-06 NOTE — ED PROVIDER NOTES
[facility] ED  Emergency Department  Emergency Medicine Resident Turn-Over     Note Started: 3:30 PM EST    Care of Mina Gill was assumed from Dr. Albright and is being seen for Chest Pain  .  The patient's initial evaluation and plan have been discussed with the prior provider who initially evaluated the patient.     EMERGENCY DEPARTMENT COURSE / MEDICAL DECISION MAKING:       MEDICATIONS GIVEN:  No orders of the defined types were placed in this encounter.      LABS / RADIOLOGY:     Labs Reviewed   BASIC METABOLIC PANEL - Abnormal; Notable for the following components:       Result Value    Glucose 151 (*)     Creatinine 1.4 (*)     Est, Glom Filt Rate 48 (*)     All other components within normal limits   TROPONIN - Abnormal; Notable for the following components:    Troponin, High Sensitivity 32 (*)     All other components within normal limits   TROPONIN - Abnormal; Notable for the following components:    Troponin, High Sensitivity 31 (*)     All other components within normal limits   CBC   BRAIN NATRIURETIC PEPTIDE   PREVIOUS SPECIMEN   SPECIMEN REJECTION       XR CHEST (2 VW)    Result Date: 3/6/2024  EXAMINATION: TWO XRAY VIEWS OF THE CHEST 3/6/2024 11:53 am COMPARISON: None. HISTORY: ORDERING SYSTEM PROVIDED HISTORY: chest pain TECHNOLOGIST PROVIDED HISTORY: chest pain FINDINGS: The lungs appear clear. The heart and mediastinal structures are unremarkable. Bony thorax appears normal. Visualized upper abdomen is unremarkable.     No acute cardiopulmonary process.     XR LUMBAR SPINE (MIN 4 VIEWS)    Result Date: 2/14/2024  EXAMINATION: 4 XRAY VIEWS OF THE LUMBAR SPINE 2/12/2024 1:59 pm COMPARISON: None. HISTORY: ORDERING SYSTEM PROVIDED HISTORY: Lumbar spondylosis TECHNOLOGIST PROVIDED HISTORY: eval alignment and for instability FINDINGS: There are degenerative disc changes noted in the lower thoracic and lumbar spine with osteophytes.  There is narrowing of the L1-L2, L3-L4 and  L4-L5 discs.  There is some spondylolisthesis of L4 on L5 without change on flexion or extension.  There is some osteoarthritic changes of lower lumbar facet joints.     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 Grade 1 spondylolisthesis of L4 on L5.  There is no instability on flexion or extension.     FLUORO FOR SURGICAL PROCEDURES    Result Date: 2/7/2024  Radiology result is complete; follow up with provider / physician office for radiology results       RECENT VITALS:     Temp: 97.8 °F (36.6 °C),  Pulse: 70, Respirations: 17, BP: (!) 140/73, SpO2: 96 %    This patient is a 83 y.o. Male with left-sided chest pain, last cardiac cath was in 2022 and did show some stenosis.  Currently awaiting second troponin, first 1 found to be 32.  Dispo pending repeat Trope if greater than delta 5 will send to medicine team if less than delta 5 will place in the observation unit for cardiac evaluation.    ED Course as of 03/06/24 1531   Wed Mar 06, 2024   1333 Lab never called to say trop was hemolyzed, so additional one had to then be sent [CE]   1350 Care assumed       L sided CP, last cath 2022  Admission for cardiac eval.  [SS]      ED Course User Index  [CE] Shanon Albright, DO  [SS] Ward Abebe MD       OUTSTANDING TASKS / RECOMMENDATIONS:    Troponin  Admission     FINAL IMPRESSION:     1. Chest pain, unspecified type        DISPOSITION:         DISPOSITION:  []  Discharge   []  Transfer -    [x]  Admission -obs   []  Against Medical Advice   []  Eloped   FOLLOW-UP: No follow-up provider specified.   DISCHARGE MEDICATIONS: New Prescriptions    No medications on file           Ward Abebe MD  Emergency Medicine Resident  [facility]

## 2024-03-06 NOTE — ED NOTES
Pt belongings were placed in belongings bag  Wallet, slippers, cane, phone, jeans and fleece pullover

## 2024-03-06 NOTE — ED PROVIDER NOTES
[facility] OBSERVATION UNIT  Emergency Department Encounter  Emergency Medicine Resident     Pt Name:Mina Gill  MRN: [id number]  Birthdate 1940  Date of evaluation: 3/6/24  PCP:  Jamal Barnes MD  11:23 AM EST      CHIEF COMPLAINT       Chief Complaint   Patient presents with    Chest Pain       HISTORY OF PRESENT ILLNESS  (Location/Symptom, Timing/Onset, Context/Setting, Quality, Duration, Modifying Factors, Severity.)      Mina Gill is a 83 y.o. male who presents with patient with left-sided chest pain that radiates to his back started around 5:00 this morning.  He did take his daily medications around 6 AM which reports did help with his chest pain.  He continues to have mild symptoms at this time but does report he felt short of breath as well as some nausea with his symptoms.  Patient does have a history of CAD, also history of previous stents.  Has a baseline tremor to his right arm.  Was given 324 of aspirin by EMS.    Last cath  Procedure Summary      1. Single vessel coronary artery disease.   2. Patent stent in ostial LAD   3. Mild Nonobstructive CAD otherwise   4. 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       has a past surgical history that includes Colonoscopy (11/02/2012); shoulder surgery (Right); pr colsc flx w/rmvl of tumor polyp lesion snare tq (N/A, 09/19/2017);

## 2024-03-06 NOTE — ED TRIAGE NOTES
Pt presents to the ER via [facility]  Pt states he was woken by chest pain at 0500. Pt states when the pain persisted the decided to call 911.   Pt does have cardiac history.  Pt was given 324 of aspirin, no nitro  Pt denies smoking   Pt denies shortness of breath with this chest pain. Pt denies radiation of pain anywhere

## 2024-03-06 NOTE — CARE COORDINATION
Case Management Assessment  Initial Evaluation    Date/Time of Evaluation: 3/6/2024 6:16 PM  Assessment Completed by: LASHONDA CHRISTIANSEN RN    If patient is discharged prior to next notation, then this note serves as note for discharge by case management.    Patient Name: Mina Gill                   YOB: 1940  Diagnosis: Chest pain [R07.9]  Chest pain, unspecified type [R07.9]                   Date / Time: 3/6/2024 10:41 AM    Patient Admission Status: Observation   Readmission Risk (Low < 19, Mod (19-27), High > 27): Readmission Risk Score: 31.1    Current PCP: Olivia Feldman MD  PCP verified by CM? Yes (olivia feldman md)    Chart Reviewed: Yes      History Provided by: Patient  Patient Orientation: Alert and Oriented    Patient Cognition: Alert    Hospitalization in the last 30 days (Readmission):  No    If yes, Readmission Assessment in CM Navigator will be completed.    Advance Directives:      Code Status: Full Code   Patient's Primary Decision Maker is: Legal Next of Kin    Primary Decision Maker: Jhony Gill - Child - [phone]    Primary Decision Maker: Praneeth Gill - Child - [phone]    Primary Decision Maker: Ivone Gill - Child - [phone]    Discharge Planning:    Patient lives with: Friends Type of Home: House  Primary Care Giver: Friend (yael)  Patient Support Systems include: Friends/Neighbors, Home Care Staff (yael)   Current Financial resources: Medicare  Current community resources: ECF/Home Care  Current services prior to admission: Durable Medical Equipment, Home Care            Current DME: Cane, Walker, Crutches            Type of Home Care services:  Nursing Services    ADLS  Prior functional level: Assistance with the following:, Shopping, Housework, Cooking, Mobility  Current functional level: Assistance with the following:, Shopping, Housework, Cooking, Mobility    PT AM-PAC:   /24  OT AM-PAC:   /24    Family can provide assistance at DC: Other (comment)  (caregiver yael/nadeem home care)  Would you like Case Management to discuss the discharge plan with any other family members/significant others, and if so, who?    Plans to Return to Present Housing: Yes  Other Identified Issues/Barriers to RETURNING to current housing: none  Potential Assistance needed at discharge: Transportation, Home Care              Patient expects to discharge to: House  Plan for transportation at discharge: [facility]    Financial    Payor: [employer] MEDICARE / Plan: WELLCARE ACCESS / Product Type: *No Product type* /     Does insurance require precert for SNF: Yes    Potential assistance Purchasing Medications: No  Meds-to-Beds request:  yes      [facility] - [place], [place] - [address] - P [phone] - F [phone]  [address]  [place] [address]  Phone: [phone] Fax: [phone]    [facility] - [place], [place] - [address] - P [phone] - F [phone]  [address] [place] [address]  Phone: [phone] Fax: [phone]    [id number] - [place], [place] - [address] - P [phone] - F [phone]  [address] [facility] [place] [address]  Phone: [phone] Fax: [phone]      Notes:    Factors facilitating achievement of predicted outcomes: Caregiver support    Barriers to discharge: chest pain    Additional Case Management Notes: return home with caregiver yael, current with nadeem, may need help with transportation @ [place]     The Plan for Transition of Care is related to the following treatment goals of Chest pain [R07.9]  Chest pain, unspecified type [R07.9]    IF APPLICABLE: The Patient and/or patient representative Mina and his family were provided with a choice of provider and agrees with the discharge plan. Freedom of choice list with basic dialogue that supports the patient's individualized plan of care/goals and shares the quality data associated with the providers was provided to:     Patient       The Patient

## 2024-03-06 NOTE — H&P
[facility]  CDU / OBSERVATION ENCOUNTER  RESIDENT NOTE     Pt Name: Mina Gill  MRN: [id number]  Birthdate 1940  Date of evaluation: 3/6/24  Patient's PCP is :  Jamal Barnes MD    CHIEF COMPLAINT       Chief Complaint   Patient presents with    Chest Pain         HISTORY OF PRESENT ILLNESS    Mina Gill is a 83 y.o. male who presents with complaints of persistent chest discomfort that started at 5:00 this morning.  Patient has an extensive cardiac history which includes history of a NSTEMI in 2022.  He also endorses some intermittent shortness of breath.  He currently denies fever, chills, nausea and vomiting    Location/Symptom: Left-sided chest pain  Timing/Onset: 1 day  Provocation: Unknown  Quality: Sharp  Radiation: Nonradiating  Severity: 6/10  Timing/Duration: Intermittent  Modifying Factors: n./a    History was obtained in part through review of the ED chart. When possible, a direct discussion was had with ED nurses, residents, and attendings          REVIEW OF SYSTEMS       Review of Systems   Constitutional:  Negative for chills, fatigue and fever.   Respiratory:  Positive for shortness of breath. Negative for cough.    Cardiovascular:  Positive for chest pain.   Gastrointestinal:  Negative for abdominal pain, constipation and diarrhea.   Genitourinary:  Negative for difficulty urinating.   Musculoskeletal:  Negative for arthralgias, back pain and myalgias.   Neurological:  Negative for dizziness and headaches.   All other systems reviewed and are negative.        (PQRS) Advance directives on face sheet per hospital policy. 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 (HCC), CKD (chronic kidney disease) stage 2, GFR 60-89 ml/min, CKD (chronic kidney disease) stage 3, GFR 30-59 ml/min (HCC), Diverticulosis of colon, GERD (gastroesophageal reflux disease), History of  smoking  Positive family history  Obesity  CAD  (SLE, CKDz, HIV, Cocaine abuse)  Troponin Levels  = Normal Limit (0 pts)  1-3 Times Normal Limit (1 pt)  > 3 Times Normal Limit (2 pts)  TOTAL:    Percent Risk for Major Adverse Cardiac Event (MACE)  0-3 pts indicates low risk for MACE   2.5% (DISCHARGE)   4-7 pts indicates moderate risk for MACE  20.3% (OBS)  8-10 pts indicates high risk for MACE  72.7% (EARLY INVASIVE TX)    CDU IMPRESSION / PLAN      Mina Gill is a 83 y.o. male who presents with complaints of chest pain starting the morning prior to arrival to the emergency department.  Patient has a history of NSTEMI and also endorses some intermittent shortness of breath.  Given clinical presentation will admit for further observation and cardiac evaluation    Inpatient consult to cardiology-appreciate recommendations  Symptom management  Continue home medications and pain control  Monitor vitals, labs, and imaging  DISPO: pending consults and clinical improvement    CONSULTS:    IP CONSULT TO CARDIOLOGY  IP CONSULT TO CARDIOLOGY    PROCEDURES:  Not indicated       PATIENT REFERRED TO:    No follow-up provider specified.    My Supervising Physician for today is  Dr. Frank Kirby  We discussed and agreed upon a treatment plan   [facility]   Emergency Medicine Resident     This dictation was generated by voice recognition computer software.  Although all attempts are made to edit the dictation for accuracy, there may be errors in the transcription that are not intended.

## 2024-03-06 NOTE — ED NOTES
ED to inpatient nurses report      Chief Complaint:  Chief Complaint   Patient presents with    Chest Pain     Present to ED from: home    MOA:     LOC: alert and orientated to name, place, date  Mobility: Requires assistance * 2  Oxygen Baseline: RA    Current needs required: RA   Pending ED orders: none  Present condition: stable    Why did the patient come to the ED? PT was awoken by chest pain this morning, when the pain persisted pt called 911  What is the plan? Monitor and follow  Any procedures or intervention occur? no  Any safety concerns??no    Mental Status:  Level of Consciousness: Alert (0)    Psych Assessment:      Vital signs   Vitals:    03/06/24 1311 03/06/24 1312 03/06/24 1313 03/06/24 1315   BP:    115/71   Pulse: 59 57 57 56   Resp: 18 15 16 16   Temp:       TempSrc:       SpO2: 92% 94% 94% 94%   Weight:            Vitals:  Patient Vitals for the past 24 hrs:   BP Temp Temp src Pulse Resp SpO2 Weight   03/06/24 1315 115/71 -- -- 56 16 94 % --   03/06/24 1313 -- -- -- 57 16 94 % --   03/06/24 1312 -- -- -- 57 15 94 % --   03/06/24 1311 -- -- -- 59 18 92 % --   03/06/24 1310 -- -- -- 57 15 93 % --   03/06/24 1309 -- -- -- 58 17 93 % --   03/06/24 1300 117/85 -- -- 63 17 92 % --   03/06/24 1145 114/61 -- -- 60 20 95 % --   03/06/24 1130 112/63 -- -- 61 15 94 % --   03/06/24 1045 (!) 124/101 97.8 °F (36.6 °C) Oral 68 18 97 % 85.7 kg (189 lb)      Visit Vitals  /71   Pulse 56   Temp 97.8 °F (36.6 °C) (Oral)   Resp 16   Wt 85.7 kg (189 lb)   SpO2 94%   BMI 29.60 kg/m²        LDAs:   Peripheral IV 03/06/24 Distal;Right Wrist (Active)   Site Assessment Clean, dry & intact;Clean;Dry;Intact 03/06/24 1052   Line Status Blood return noted;Brisk blood return 03/06/24 1052       Ambulatory Status:  No data recorded    Diagnosis:  DISPOSITION Admitted 03/06/2024 01:58:02 PM   Final diagnoses:   Chest pain, unspecified type        Consults:  IP CONSULT TO CARDIOLOGY  IP CONSULT TO CARDIOLOGY     Treatment  Team:   Treatment Team: Attending Provider: Frank Kirby MD; Registered Nurse: Suyapa Whatley RN; Consulting Physician: Jd Mcpherson MD; Consulting Physician: Guerita Jeffrey MD; Resident: Ward Abebe MD    Treatment:  ED Course as of 03/06/24 1446   Wed Mar 06, 2024   1333 Lab never called to say trop was hemolyzed, so additional one had to then be sent [CE]   1350 Care assumed       L sided CP, last cath 2022  Admission for cardiac eval.  [SS]      ED Course User Index  [CE] Shanon Albright DO  [SS] Ward Abebe MD          Skin Assessment:        Pain Score:  Pain Assessment  Pain Assessment: 0-10  Pain Level: 3  Pain Location: Chest  Pain Descriptors: Aching      SOCIAL HISTORY       Social History     Socioeconomic History    Marital status: Single   Tobacco Use    Smoking status: Former    Smokeless tobacco: Never   Vaping Use    Vaping Use: Never used   Substance and Sexual Activity    Alcohol use: No    Drug use: No    Sexual activity: Not Currently     Social Determinants of Health     Financial Resource Strain: Low Risk  (7/19/2023)    Overall Financial Resource Strain (CARDIA)     Difficulty of Paying Living Expenses: Not hard at all   Transportation Needs: Unknown (7/19/2023)    PRAPARE - Transportation     Lack of Transportation (Non-Medical): No   Physical Activity: Insufficiently Active (11/16/2023)    Exercise Vital Sign     Days of Exercise per Week: 1 day     Minutes of Exercise per Session: 10 min   Stress: Stress Concern Present (6/17/2022)    [employer] - Occupational Stress Questionnaire     Feeling of Stress : To some extent   Social Connections: Socially Isolated (6/17/2022)    Social Connection and Isolation Panel [NHANES]     Frequency of Communication with Friends and Family: Three times a week     Attends [employer] Services: Never     Active Member of Clubs or Organizations: No     Attends Club or Organization Meetings: Never

## 2024-03-07 ENCOUNTER — APPOINTMENT (OUTPATIENT)
Dept: NUCLEAR MEDICINE | Age: 84
End: 2024-03-07
Payer: MEDICARE

## 2024-03-07 ENCOUNTER — APPOINTMENT (OUTPATIENT)
Age: 84
End: 2024-03-07
Payer: MEDICARE

## 2024-03-07 VITALS
DIASTOLIC BLOOD PRESSURE: 68 MMHG | OXYGEN SATURATION: 99 % | SYSTOLIC BLOOD PRESSURE: 116 MMHG | HEIGHT: 66 IN | HEART RATE: 60 BPM | TEMPERATURE: 97.9 F | WEIGHT: 187.83 LBS | RESPIRATION RATE: 18 BRPM | BODY MASS INDEX: 30.19 KG/M2

## 2024-03-07 LAB
ECHO BSA: 1.99 M2
STRESS BASELINE HR: 62 BPM
STRESS ESTIMATED WORKLOAD: 1 METS
STRESS PEAK DIAS BP: 75 MMHG
STRESS PEAK SYS BP: 139 MMHG
STRESS PERCENT HR ACHIEVED: 55 %
STRESS POST PEAK HR: 75 BPM
STRESS RATE PRESSURE PRODUCT: NORMAL BPM*MMHG
STRESS ST DEPRESSION: 0 MM
STRESS TARGET HR: 136 BPM

## 2024-03-07 PROCEDURE — A9500 TC99M SESTAMIBI: HCPCS | Performed by: STUDENT IN AN ORGANIZED HEALTH CARE EDUCATION/TRAINING PROGRAM

## 2024-03-07 PROCEDURE — 78452 HT MUSCLE IMAGE SPECT MULT: CPT

## 2024-03-07 PROCEDURE — 93016 CV STRESS TEST SUPVJ ONLY: CPT | Performed by: RADIOLOGY

## 2024-03-07 PROCEDURE — 93017 CV STRESS TEST TRACING ONLY: CPT

## 2024-03-07 PROCEDURE — 3430000000 HC RX DIAGNOSTIC RADIOPHARMACEUTICAL: Performed by: STUDENT IN AN ORGANIZED HEALTH CARE EDUCATION/TRAINING PROGRAM

## 2024-03-07 PROCEDURE — G0378 HOSPITAL OBSERVATION PER HR: HCPCS

## 2024-03-07 PROCEDURE — 2580000003 HC RX 258: Performed by: STUDENT IN AN ORGANIZED HEALTH CARE EDUCATION/TRAINING PROGRAM

## 2024-03-07 PROCEDURE — 99222 1ST HOSP IP/OBS MODERATE 55: CPT | Performed by: INTERNAL MEDICINE

## 2024-03-07 PROCEDURE — 93005 ELECTROCARDIOGRAM TRACING: CPT | Performed by: EMERGENCY MEDICINE

## 2024-03-07 PROCEDURE — 6370000000 HC RX 637 (ALT 250 FOR IP): Performed by: STUDENT IN AN ORGANIZED HEALTH CARE EDUCATION/TRAINING PROGRAM

## 2024-03-07 PROCEDURE — 6360000002 HC RX W HCPCS: Performed by: STUDENT IN AN ORGANIZED HEALTH CARE EDUCATION/TRAINING PROGRAM

## 2024-03-07 RX ORDER — AMINOPHYLLINE 25 MG/ML
50 INJECTION, SOLUTION INTRAVENOUS PRN
Status: DISCONTINUED | OUTPATIENT
Start: 2024-03-07 | End: 2024-03-07 | Stop reason: ALTCHOICE

## 2024-03-07 RX ORDER — SODIUM CHLORIDE 0.9 % (FLUSH) 0.9 %
10 SYRINGE (ML) INJECTION PRN
Status: DISCONTINUED | OUTPATIENT
Start: 2024-03-07 | End: 2024-03-07 | Stop reason: HOSPADM

## 2024-03-07 RX ORDER — SODIUM CHLORIDE 0.9 % (FLUSH) 0.9 %
5-40 SYRINGE (ML) INJECTION PRN
Status: DISCONTINUED | OUTPATIENT
Start: 2024-03-07 | End: 2024-03-07 | Stop reason: ALTCHOICE

## 2024-03-07 RX ORDER — TETRAKIS(2-METHOXYISOBUTYLISOCYANIDE)COPPER(I) TETRAFLUOROBORATE 1 MG/ML
11.5 INJECTION, POWDER, LYOPHILIZED, FOR SOLUTION INTRAVENOUS
Status: COMPLETED | OUTPATIENT
Start: 2024-03-07 | End: 2024-03-07

## 2024-03-07 RX ORDER — ALBUTEROL SULFATE 90 UG/1
2 AEROSOL, METERED RESPIRATORY (INHALATION) PRN
Status: DISCONTINUED | OUTPATIENT
Start: 2024-03-07 | End: 2024-03-07 | Stop reason: ALTCHOICE

## 2024-03-07 RX ORDER — REGADENOSON 0.08 MG/ML
0.4 INJECTION, SOLUTION INTRAVENOUS
Status: COMPLETED | OUTPATIENT
Start: 2024-03-07 | End: 2024-03-07

## 2024-03-07 RX ORDER — METOPROLOL TARTRATE 1 MG/ML
5 INJECTION, SOLUTION INTRAVENOUS EVERY 5 MIN PRN
Status: DISCONTINUED | OUTPATIENT
Start: 2024-03-07 | End: 2024-03-07 | Stop reason: ALTCHOICE

## 2024-03-07 RX ORDER — SODIUM CHLORIDE 9 MG/ML
500 INJECTION, SOLUTION INTRAVENOUS CONTINUOUS PRN
Status: DISCONTINUED | OUTPATIENT
Start: 2024-03-07 | End: 2024-03-07 | Stop reason: ALTCHOICE

## 2024-03-07 RX ORDER — TETRAKIS(2-METHOXYISOBUTYLISOCYANIDE)COPPER(I) TETRAFLUOROBORATE 1 MG/ML
42 INJECTION, POWDER, LYOPHILIZED, FOR SOLUTION INTRAVENOUS
Status: COMPLETED | OUTPATIENT
Start: 2024-03-07 | End: 2024-03-07

## 2024-03-07 RX ORDER — NITROGLYCERIN 0.4 MG/1
0.4 TABLET SUBLINGUAL EVERY 5 MIN PRN
Status: DISCONTINUED | OUTPATIENT
Start: 2024-03-07 | End: 2024-03-07 | Stop reason: ALTCHOICE

## 2024-03-07 RX ORDER — ATROPINE SULFATE 0.1 MG/ML
0.5 INJECTION INTRAVENOUS EVERY 5 MIN PRN
Status: DISCONTINUED | OUTPATIENT
Start: 2024-03-07 | End: 2024-03-07 | Stop reason: ALTCHOICE

## 2024-03-07 RX ADMIN — ISOSORBIDE MONONITRATE 30 MG: 30 TABLET, EXTENDED RELEASE ORAL at 14:30

## 2024-03-07 RX ADMIN — ALLOPURINOL 100 MG: 100 TABLET ORAL at 14:29

## 2024-03-07 RX ADMIN — REGADENOSON 0.4 MG: 0.08 INJECTION, SOLUTION INTRAVENOUS at 11:37

## 2024-03-07 RX ADMIN — ENTACAPONE 200 MG: 200 TABLET, FILM COATED ORAL at 14:30

## 2024-03-07 RX ADMIN — CARBIDOPA AND LEVODOPA 1 TABLET: 25; 100 TABLET ORAL at 14:29

## 2024-03-07 RX ADMIN — ATORVASTATIN CALCIUM 40 MG: 40 TABLET, FILM COATED ORAL at 14:30

## 2024-03-07 RX ADMIN — SODIUM CHLORIDE, PRESERVATIVE FREE 10 ML: 5 INJECTION INTRAVENOUS at 11:37

## 2024-03-07 RX ADMIN — CLOPIDOGREL BISULFATE 75 MG: 75 TABLET ORAL at 14:29

## 2024-03-07 RX ADMIN — ROPINIROLE HYDROCHLORIDE 0.5 MG: 0.25 TABLET, FILM COATED ORAL at 14:29

## 2024-03-07 RX ADMIN — MAGNESIUM OXIDE 400 MG (241.3 MG MAGNESIUM) TABLET 400 MG: TABLET at 14:29

## 2024-03-07 RX ADMIN — AMLODIPINE BESYLATE 5 MG: 5 TABLET ORAL at 14:30

## 2024-03-07 RX ADMIN — TETRAKIS(2-METHOXYISOBUTYLISOCYANIDE)COPPER(I) TETRAFLUOROBORATE 11.5 MILLICURIE: 1 INJECTION, POWDER, LYOPHILIZED, FOR SOLUTION INTRAVENOUS at 11:39

## 2024-03-07 RX ADMIN — SODIUM CHLORIDE, PRESERVATIVE FREE 5 ML: 5 INJECTION INTRAVENOUS at 10:00

## 2024-03-07 RX ADMIN — PANTOPRAZOLE SODIUM 40 MG: 40 TABLET, DELAYED RELEASE ORAL at 16:38

## 2024-03-07 RX ADMIN — SODIUM CHLORIDE, PRESERVATIVE FREE 10 ML: 5 INJECTION INTRAVENOUS at 11:39

## 2024-03-07 RX ADMIN — SODIUM CHLORIDE, PRESERVATIVE FREE 10 ML: 5 INJECTION INTRAVENOUS at 13:00

## 2024-03-07 RX ADMIN — TAMSULOSIN HYDROCHLORIDE 0.4 MG: 0.4 CAPSULE ORAL at 14:30

## 2024-03-07 RX ADMIN — SUCRALFATE 1 G: 1 TABLET ORAL at 14:30

## 2024-03-07 RX ADMIN — TETRAKIS(2-METHOXYISOBUTYLISOCYANIDE)COPPER(I) TETRAFLUOROBORATE 42 MILLICURIE: 1 INJECTION, POWDER, LYOPHILIZED, FOR SOLUTION INTRAVENOUS at 13:00

## 2024-03-07 RX ADMIN — SODIUM CHLORIDE, PRESERVATIVE FREE 10 ML: 5 INJECTION INTRAVENOUS at 11:02

## 2024-03-07 ASSESSMENT — PAIN SCALES - GENERAL
PAINLEVEL_OUTOF10: 0
PAINLEVEL_OUTOF10: 0

## 2024-03-07 ASSESSMENT — HEART SCORE: ECG: 0

## 2024-03-07 NOTE — CONSULTS
[facility]   Consult Note                 Date:   3/7/2024  Patient name: Mina Gill  Date of admission:  3/6/2024 10:41 AM  MRN:   [id number]  YOB: 1940    Reason for Consult:  Chest pain    CHIEF COMPLAINT:  Chest pain    History Obtained From:  Patient     HISTORY OF PRESENT ILLNESS:    The patient is a 83 y.o. male  with past medical history of CKD, CAD s/p stent placement presented to the ED with complains of chest pain which woke him up from sleep around 0500 on 3/6/24. He took his morning medications and the pain was still persistent at 0600 which prompted him to call EMS. He was given 324mg of aspirin en route to the hospital. The pain is sharp and left sided but does not radiate anywhere. He mentions having trouble catching his breath at times but denies any associated nausea, vomiting, diaphoresis, palpitations. Patient was seen in the office on 2/9/24 and outpatient nuclear stress test as well as TTE was ordered for the patient but he did not have it done.     He does not smoke anymore. Patient has a history of NSTEMI, previous cardiac cath was in 2022 which showed a stent in the ostial LAD as well as single vessel CAD.       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    Past Surgical History:   has a past surgical history that includes Colonoscopy (11/02/2012); shoulder surgery (Right); pr colsc flx w/rmvl of tumor polyp lesion snare tq (N/A,  motion abnormality seen.  Grade I (mild) left ventricular diastolic dysfunction.  Right Atrium  Right atrium is normal in size.  Right Ventricle  Right ventricle was not well visualized.  Mitral Valve  No obvious valvular abnormality seen.  Mild mitral regurgitation.  Aortic Valve  No obvious valvular abnormality seen.  Mild aortic insufficiency.  Tricuspid Valve  No obvious valvular abnormality.  Mild tricuspid regurgitation.  Mild pulmonary hypertension. Estimated right ventricular systolic pressure  is 41mmHg.  Pulmonic Valve  Pulmonic valve was not well visualized.  Trivial pulmonic insufficiency.  Pericardial Effusion  No significant pericardial effusion is seen.  Pleural Effusion  No pleural effusion seen.  Miscellaneous  Normal aortic root dimension    Stress  9/3/21  Electrocardiographically negative lexiscan study     Assessment:    Chest pain   Chronic HS trop elevation  CKD  CAD with PCI to LAD  HTN  Hyperlipidemia  Parkinson's dementia   Will discuss further recommendation and plan with attending during rounds.     Plan:  - He was supposed to have lexiscan stress test as OP. I will proceed with inpatient testing  -TTE will be done as OP  -Continue plavix  -Continue norvasc and imdur  -Continue statin    Lobito Tracy MD

## 2024-03-07 NOTE — DISCHARGE SUMMARY
Discharge teaching and instructions completed with patient using teachback method. AVS reviewed. IV removed.Patient voiced understanding regarding prescriptions, follow up appointments, and care of self at home. Discharged with all belongings home with Lisha. All questions answered.

## 2024-03-07 NOTE — PROGRESS NOTES
[facility]  CDU / OBSERVATION ENCOUNTER  ATTENDING NOTE       I performed a history and physical examination of the patient and discussed management with the resident or midlevel provider. I reviewed the resident or midlevel provider's note and agree with the documented findings and plan of care. Any areas of disagreement are noted on the chart. I was personally present for the key portions of any procedures. I have documented in the chart those procedures where I was not present during the key portions. I have reviewed the nurses notes. I agree with the chief complaint, past medical history, past surgical history, allergies, medications, social and family history as documented unless otherwise noted below.    The Family history, social history, and ROS are effectively unchanged since admission unless noted elsewhere in the chart.    This patient was placed in the observation unit for reevaluation for possible admission to the hospital    Patient with persistent chest discomfort.  Patient with previous recent admissions for same.  Past medical history includes NSTEMI in 2022.  Patient also with complaints of shortness of breath.    Patient with initial negative workup including flat troponins.  Patient admitted for cardiology reevaluation.    Frank Kirby MD  Attending Emergency  Physician

## 2024-03-07 NOTE — PLAN OF CARE
Problem: Chronic Conditions and Co-morbidities  Goal: Patient's chronic conditions and co-morbidity symptoms are monitored and maintained or improved  3/7/2024 1601 by Laureen Lockhart RN  Outcome: Completed  3/7/2024 0648 by Ann Marcano RN  Outcome: Progressing     Problem: Discharge Planning  Goal: Discharge to home or other facility with appropriate resources  3/7/2024 1601 by Laureen Lockhart RN  Outcome: Completed  3/7/2024 0648 by Ann Marcano RN  Outcome: Progressing     Problem: Pain  Goal: Verbalizes/displays adequate comfort level or baseline comfort level  3/7/2024 1601 by Laureen Lockhart RN  Outcome: Completed  3/7/2024 0648 by Ann Marcano RN  Outcome: Progressing     Problem: Skin/Tissue Integrity  Goal: Absence of new skin breakdown  Description: 1.  Monitor for areas of redness and/or skin breakdown  2.  Assess vascular access sites hourly  3.  Every 4-6 hours minimum:  Change oxygen saturation probe site  4.  Every 4-6 hours:  If on nasal continuous positive airway pressure, respiratory therapy assess nares and determine need for appliance change or resting period.  Outcome: Completed     Problem: Safety - Adult  Goal: Free from fall injury  Outcome: Completed

## 2024-03-07 NOTE — PROGRESS NOTES
OBS/CDU   RESIDENT NOTE      Patients PCP is:  Jamal Barnes MD        SUBJECTIVE      No acute events overnight.  Reports that his chest pain has improved.  Has been able to tolerate a full diet without nausea or vomiting. The patient is urinating on his own and is passing flatus. Denies fever, chills, nausea, vomiting, chest pain, shortness of breath, abdominal pain, focal weakness, numbness, tingling, urinary/bowel symptoms, vision changes, visual hallucinations, or headache.       PHYSICAL EXAM      General: NAD, AO X 3  Heent: EMOI, PERRL  Neck: SUPPLE, NO JVD  Cardiovascular: RRR, S1S2  Pulmonary: CTAB, NO SOB  Abdomen: SOFT, NTTP, ND, +BS  Extremities: +2/4 PULSES DISTAL, NO SWELLING  Neuro / Psych: NO NUMBNESS OR TINGLING, MENTATION AT BASELINE    PERTINENT TEST /EXAMS      I have reviewed all available laboratory results.    MEDICATIONS CURRENT   allopurinol (ZYLOPRIM) tablet 100 mg, Daily  amitriptyline (ELAVIL) tablet 10 mg, Nightly PRN  amLODIPine (NORVASC) tablet 5 mg, Daily  atorvastatin (LIPITOR) tablet 40 mg, Daily  carbidopa-levodopa (SINEMET)  MG per tablet 1 tablet, 4x Daily  clopidogrel (PLAVIX) tablet 75 mg, Daily  entacapone (COMTAN) tablet 200 mg, 4x Daily  gabapentin (NEURONTIN) capsule 100 mg, Nightly  isosorbide mononitrate (IMDUR) extended release tablet 30 mg, Daily  magnesium oxide (MAG-OX) tablet 400 mg, Daily  pantoprazole (PROTONIX) tablet 40 mg, BID AC  rOPINIRole (REQUIP) tablet 0.5 mg, TID  sucralfate (CARAFATE) tablet 1 g, 4 times per day  tamsulosin (FLOMAX) capsule 0.4 mg, Daily  sodium chloride flush 0.9 % injection 5-40 mL, 2 times per day  sodium chloride flush 0.9 % injection 5-40 mL, PRN  0.9 % sodium chloride infusion, PRN  potassium chloride (KLOR-CON M) extended release tablet 40 mEq, PRN   Or  potassium bicarb-citric acid (EFFER-K) effervescent tablet 40 mEq, PRN   Or  potassium chloride 10 mEq/100 mL IVPB (Peripheral Line), PRN  ondansetron (ZOFRAN) injection 4  mg, Q4H PRN  polyethylene glycol (GLYCOLAX) packet 17 g, Daily PRN  acetaminophen (TYLENOL) tablet 650 mg, Q6H PRN   Or  acetaminophen (TYLENOL) suppository 650 mg, Q6H PRN        All medication charted and reviewed.    CONSULTS      IP CONSULT TO CARDIOLOGY  IP CONSULT TO CARDIOLOGY    ASSESSMENT/PLAN       Mina Gill is a 83 y.o. male who presents with complaints of chest pain starting the morning prior to arrival to the emergency department.  Patient has a history of NSTEMI and also endorses some intermittent shortness of breath.  Given clinical presentation will admit for further observation and cardiac evaluation     Inpatient consult to cardiology-appreciate recommendations  Plan for stress test today   Troponins: 32, 31, increased from priors  Creatinine elevated at 1.4 with GFR 48, consistent with baseline  BNP not elevated at 252  Chest x-ray negative  Symptom management  Continue home medications and pain control  Monitor vitals, labs, and imaging  DISPO: pending consults and clinical improvement    --  Noemy Hummel MD  Emergency Medicine Resident Physician     This dictation was generated by voice recognition computer software.  Although all attempts are made to edit the dictation for accuracy, there may be errors in the transcription that are not intended.

## 2024-03-08 ENCOUNTER — CARE COORDINATION (OUTPATIENT)
Dept: CARE COORDINATION | Age: 84
End: 2024-03-08

## 2024-03-08 ENCOUNTER — APPOINTMENT (OUTPATIENT)
Dept: FAMILY MEDICINE CLINIC | Age: 84
End: 2024-03-08
Payer: MEDICARE

## 2024-03-08 LAB
EKG ATRIAL RATE: 53 BPM
EKG ATRIAL RATE: 71 BPM
EKG P AXIS: 106 DEGREES
EKG P AXIS: 41 DEGREES
EKG P-R INTERVAL: 190 MS
EKG P-R INTERVAL: 206 MS
EKG Q-T INTERVAL: 424 MS
EKG Q-T INTERVAL: 472 MS
EKG QRS DURATION: 114 MS
EKG QRS DURATION: 116 MS
EKG QTC CALCULATION (BAZETT): 442 MS
EKG QTC CALCULATION (BAZETT): 460 MS
EKG R AXIS: -54 DEGREES
EKG R AXIS: -84 DEGREES
EKG T AXIS: 105 DEGREES
EKG T AXIS: 71 DEGREES
EKG VENTRICULAR RATE: 53 BPM
EKG VENTRICULAR RATE: 71 BPM

## 2024-03-08 PROCEDURE — 93010 ELECTROCARDIOGRAM REPORT: CPT | Performed by: INTERNAL MEDICINE

## 2024-03-08 NOTE — CARE COORDINATION
Patient called the HC to inform that he is doing better and has been discharged home.  Patient stated that it was a scary situation, but he is hoping to feel better.    Plan of Care  HC will attempt to reach out to patient for follow up   Progress Notes by Madeleine Solomon DO at 11/02/18 11:12 AM     Author:  Madeleine Solomon DO Service:  (none) Author Type:  Physician     Filed:  11/05/18 04:54 PM Encounter Date:  11/2/2018 Status:  Signed     :  Madeleine Solomon DO (Physician)            CC:[AS1.1T] follow up abdominal pain     Established[AS1.1M] patient    SUBJECTIVE  HPI:Iveth Echevarria is a 81 year old female who presents today with[AS1.1T] follow up for abdominal pain.[AS1.1M] Iveth Echevarria[AS1.2T] reports to feeling a \"lump\" in the abdomen of the right lower side. Painful with certain movements. The lump is not growing per patient report. No trauma or falls.  No changes in eating habits.  No reported unintentional weight loss.  She reports that the pain \"comes and goes\".  No reported nausea or vomiting.  Patient reports to occasionally eating spicy foods.  Patient reports intermittently feeling constipated and with harder stools. Pain is not worsening. No other concerns reported today.[AS1.3M]    Past Medical History:     Diagnosis  Date   • Diabetes    • Occipital neuralgia     follows with Neurology       Past Surgical History:      Procedure  Laterality Date   • HERNIA REPAIR       Social History     Substance Use Topics     • Smoking status: Never Smoker   • Smokeless tobacco: Never Used   • Alcohol use No       Allergies: Aspirin; Pseudoephedrine hcl; Tylenol arthritis pain; Tylenol [acetaminophen]; Amitriptyline; Andreina-seltzer plus cold; and Pepto-bismol[AS1.1T]    Current Outpatient Prescriptions     Medication  Sig   • FLUZONE HIGH-DOSE 0.5 ML JOSE ADM 0.5ML IM UTD   • simvastatin (ZOCOR) 20 MG tablet Take 1 Tab by mouth nightly.   • Cholecalciferol (VITAMIN D3) 2000 UNITS Cap TK 1 Capsule PO DAILY   • prednisoLONE acetate (PRED FORTE) 1 % ophthalmic suspension One drop in the left eye 2 times a day for 1 week.   • FLUAD 0.5 ML JOSE ADM 0.5ML IM UTD   • metformin (GLUCOPHAGE-XR) 500 MG 24 hr tablet TK 1 T PO  BID   •  levocetirizine (XYZAL) 5 MG tablet Take 1 Tab by mouth every evening.[AS1.4T]         Review of Systems:[AS1.1T]  General: denies fever, night sweats, weight changes, appetite changes and sleep problems  Fever:     No elevation of temperature  Heent: Pt denies problems with head, eyes, ears, nose, mouth, throat and neck  Respiratory: No coughing, wheezing, changes in voice,  nor shortness of breath  Cardiovascular:No chest pain, palpitations or other cardiac complaints noted  Gastrointestinal: MILD ABDOMINAL PAIN   * Location:[AS1.3C] RIGHT LOWER QUADRANT and EPIGASTRIC[AS1.3M]    OBJECTIVE  Vitals: /60  Pulse 67  Ht 4' 10\" (1.473 m)  Wt 121 lb 12.8 oz (55.2 kg)  SpO2 97%  BMI 25.46 kg/m2    Physical Exam:[AS1.1T]  General appearance - alert & oriented, pleasant and comfortable   Head - Normocephalic. No masses, lesions, tenderness or abnormalities  Eyes - conjunctivae/corneas clear.   Nose/Sinuses - Nares normal.   Oropharynx - Lips, mucosa, tongue, and gums normal.  Lungs - clear to auscultation without wheezes, rhonchi or rales  Heart - RRR w/o S3, S4, or murmurs.    Abdomen - Abdomen soft, non-tender. BS normal. No masses, organomegaly, rebound or guarding.[AS1.3C]    ASSESSMENT/PLAN[AS1.1T]  Abdominal pain, unspecified abdominal location  (primary encounter diagnosis)[AS1.3C]  Chronic and not worsening[AS1.3M]. Noted CT Abdomen/pelvis performed 5/2018 at [facility].[AS1.5M]  Plan: Can be 2/2 GERD[AS1.3C],[AS1.3M] constipation[AS1.3C], and or musculoskeletal?[AS1.3M]  Due to medication allergies, Iveth Echevarria is not interested in a PPI or H2 blocker. Discussed lifestyle modifications for GERD such as: elevation of the head of the bed during sleep, refraining from a supine position after meals, and avoidance of meals two to three hours before bedtime. Dietary modification discussed such as elimination of dietary triggers (fatty foods, caffeine, chocolate, spicy foods, and carbonated beverages).  Instructed  Iveth Amandado to increase fiber into the diet, with either OTC supplements and/or high fiber foods. Goal is 20-30 grams fiber a day. Iveth Echevarria can increase water[AS1.3C] as well.  Avoid exacerbating movements.[AS1.3M] Iveth Echevarria[AS1.6T] can try heating pack prn.[AS1.3M]   Iveth Echevarria[AS1.7T] declines further intervention and work up at this time .[AS1.3M]    If these abnormal clinical findings persist, appropriate workup will be completed. The patient understands that follow up is required to elucidate the situation.[AS1.3T]     Follow-up[AS1.1T] in 3 months[AS1.3M] or if symptoms do not improve or worsen, patient instructed to[AS1.1T] go to the Emergency Department[AS1.3M]    Electronically Signed by:    Madeleine Solomon DO , 11/2/2018[AS1.1T]       Revision History        User Key Date/Time User Provider Type Action    > AS1.5 11/05/18 04:54 PM Madeleine Solomon DO Physician Sign     AS1.6 11/05/18 04:50 PM Madeleine Solomon DO Physician      AS1.7 11/05/18 04:47 PM Madeleine Solomon DO Physician      AS1.4 11/05/18 04:45 PM Madeleine Solomon DO Physician      AS1.2 11/05/18 04:43 PM Madeleine Solomon DO Physician      AS1.3 11/05/18 04:41 PM Madeleine Solomon DO Physician      AS1.1 11/02/18 11:12 AM Madeleine Solomon DO Physician     C - Copied, M - Manual, T - Template

## 2024-03-08 NOTE — CARE COORDINATION
HC contacted [employer] and apologized for not informing them of the patients' hospitalization.  HC spoke to Ani to inform her that the patient missed his cab due to being inpatient.  Ani/LOPEZ thanked the HC for the call and stated that she can at least cancel the return trip for today.    Plan of Care  HC will assist the patient with  his future transportation needs

## 2024-03-08 NOTE — DISCHARGE SUMMARY
CDU Discharge Summary        Patient:  Mina Gill  YOB: 1940    MRN: [id number]   Acct: [id number]    Primary Care Physician: Jamal Barnes MD    Admit date:  3/6/2024 10:41 AM  Discharge date: 3/7/2024  5:15 PM     Discharge Diagnoses:     Acute chest pain due to nonischemic causes  Improved with rest    Follow-up:  Call today/tomorrow for a follow up appointment with Jamal Barnes MD , or return to the Emergency Room with worsening symptoms    Stressed to patient the importance of following up with primary care doctor for further workup/management of symptoms.  Pt verbalizes understanding and agrees with plan.    Discharge Medications:  Changes to medications            Medication List        CONTINUE taking these medications      allopurinol 100 MG tablet  Commonly known as: ZYLOPRIM  TAKE 1 TABLET BY MOUTH ONCE DAILY FOR GOUT     amitriptyline 10 MG tablet  Commonly known as: ELAVIL  TAKE 1 TABLET BY MOUTH AT BEDTIME FOR DEPRESSION     amLODIPine 5 MG tablet  Commonly known as: NORVASC  Take 1 tablet by mouth daily     atorvastatin 40 MG tablet  Commonly known as: LIPITOR  take 1 tablet by mouth once daily     carbidopa-levodopa  MG per tablet  Commonly known as: SINEMET  TAKE ONE (1) TABLET BY MOUTH FOUR (4) TIMES DAILY     clopidogrel 75 MG tablet  Commonly known as: PLAVIX  TAKE 1 TABLET BY MOUTH ONCE DAILY -FOR ANTICOAGULANT     diclofenac sodium 1 % Gel  Commonly known as: VOLTAREN  Apply 4 g topically 4 times daily as needed for Pain     entacapone 200 MG tablet  Commonly known as: COMTAN  TAKE 1 TABLET BY MOUTH 4 TIMES DAILY FOR PARKINSONS     gabapentin 100 MG capsule  Commonly known as: NEURONTIN     isosorbide mononitrate 30 MG extended release tablet  Commonly known as: IMDUR  Take 1 tablet by mouth daily     latanoprost 0.005 % ophthalmic solution  Commonly known as: XALATAN     magnesium oxide 400 (240 Mg) MG tablet  Commonly known as: MAG-OX  Take 1 tablet by mouth

## 2024-03-11 ENCOUNTER — TELEPHONE (OUTPATIENT)
Dept: FAMILY MEDICINE CLINIC | Age: 84
End: 2024-03-11

## 2024-03-11 NOTE — TELEPHONE ENCOUNTER
Care Transitions Initial Follow Up Call    Outreach made within 2 business days of discharge: Yes    Patient: Mina Gill Patient : 1940   MRN: [id number]  Reason for Admission: There are no discharge diagnoses documented for the most recent discharge.  Discharge Date: 3/7/24       Spoke with: KACIE FOR PATIENT TO CALL OFFICE TO SCHEDULE APPT    Discharge department/facility: [facility]

## 2024-03-12 ENCOUNTER — CARE COORDINATION (OUTPATIENT)
Dept: CARE COORDINATION | Age: 84
End: 2024-03-12

## 2024-03-12 NOTE — CARE COORDINATION
Ambulatory Care Coordination Note  3/12/2024      ACM attempted to contact the patient by telephone.  Message left on voice mail requesting return call.  Writer's contact information provided.    Method of communication with provider: staff message.    ACM: Robin Perry RN    CC Plan:       Review medications with Patient     2.   Patient receives home Physical therapy.     3.  Patient kept new patient appointment with Dr. Tracy, Cardiology, on 2/9/2024.   Dr. Tracy's plan of care was reviewed.  Writer plans to review with Patient or caregiver today:     Patient reports intermittent chest pain and shortness of breath on exertion, therefore, will obtain nuclear stress test as patient is unable to run on the treadmill due to underlying Parkinson's disease and using walking stick.  Will also obtain an echocardiogram to check for degree of aortic regurgitation is present on  previous echo.        4.  Review upcoming appointments with Patient:  Future Appointments   Date Time Provider Department Center   3/18/2024  1:45 PM SCHEDULE, [facility] [place] [facility]   4/17/2024  3:30 PM Concepcion Alcantara MD Neuro [facility] Neurology -   5/6/2024 11:00 AM Slime Bkaer APRN - CNP [facility]     5.  Patient presented to ED on 3/6/24 with chest pain.  He was admitted.  He was discharged on 3/7/24, Discharge Diagnosis:     Acute chest pain due to nonischemic causes  Improved with rest     Offered patient enrollment in the Remote Patient Monitoring (RPM) program for in-home monitoring: Patient is not eligible for RPM program.    Lab Results       None            Care Coordination Interventions    Referral from Primary Care Provider: Yes  Suggested Interventions and Community Resources  Fall Risk Prevention: In Process  Disease Specific Clinic: Completed (Comment: Dr. Ross Garcia, GI;  Dr. Chuy Bailey, Pain Mangement; Dr. Lobito Tracy, Cardiologist; ERIC Lyn, Neurology)  Home Care

## 2024-03-15 ENCOUNTER — CARE COORDINATION (OUTPATIENT)
Dept: CARE COORDINATION | Age: 84
End: 2024-03-15

## 2024-03-15 NOTE — CARE COORDINATION
Ambulatory Care Coordination Note  3/15/2024      ACM attempted to contact the patient by telephone.  Message left on Patient's voice mail requesting return call.  Writer's contact information provided.    Method of communication with provider: staff message.    ACM: Robin Perry, RN    CC Plan:       Review medications with Patient     2.   Patient receives home Physical therapy.     3.  Patient kept new patient appointment with Dr. Tracy, Cardiology, on 2/9/2024.   Dr. Tracy's plan of care was reviewed.  Writer plans to review with Patient or caregiver today:     Patient reports intermittent chest pain and shortness of breath on exertion, therefore, will obtain nuclear stress test as patient is unable to run on the treadmill due to underlying Parkinson's disease and using walking stick.  Will also obtain an echocardiogram to check for degree of aortic regurgitation is present on  previous echo.        4.  Review upcoming appointments with Patient:         Future Appointments   Date Time Provider Department Center   3/18/2024  1:45 PM SCHEDULE, [facility] [place] [facility]   4/17/2024  3:30 PM Concepcion Alcantara MD Neuro [facility] Neurology -   5/6/2024 11:00 AM Slime Baker APRN - CNP [facility]      5.  Patient presented to ED on 3/6/24 with chest pain.  He was admitted.  He was discharged on 3/7/24, Discharge Diagnosis:     Acute chest pain due to nonischemic causes  Improved with rest    Offered patient enrollment in the Remote Patient Monitoring (RPM) program for in-home monitoring: Patient is not eligible for RPM program.    Lab Results       None            Care Coordination Interventions    Referral from Primary Care Provider: Yes  Suggested Interventions and Community Resources  Fall Risk Prevention: In Process  Disease Specific Clinic: Completed (Comment: Dr. Ross Garcia, GI;  Dr. Chuy Bailey, Pain Mangement; Dr. Lobito Tracy, Cardiologist; ERIC Lyn,

## 2024-03-20 ENCOUNTER — CARE COORDINATION (OUTPATIENT)
Dept: CARE COORDINATION | Age: 84
End: 2024-03-20

## 2024-03-20 NOTE — CARE COORDINATION
HC phoned the patient to check in and found that he was doing pretty good. He reported that he's been told that there were no issues found from his testing for his chest pains.  Patient stated that it has left him a bit confused.  HC encouraged the patient to let someone know when he's experiencing pain.  Patient agreed that he will let someone know.    Plan of Care  HC will follow up with patient regarding transportation needs.

## 2024-03-26 DIAGNOSIS — K20.90 ESOPHAGITIS: ICD-10-CM

## 2024-03-26 RX ORDER — PANTOPRAZOLE SODIUM 20 MG/1
TABLET, DELAYED RELEASE ORAL
Qty: 90 TABLET | Refills: 0 | Status: SHIPPED | OUTPATIENT
Start: 2024-03-26

## 2024-03-26 NOTE — TELEPHONE ENCOUNTER
Last visit: 01/26/2024  Last Med refill: 12/23/2023  Does patient have enough medication for 72 hours: No:     Next Visit Date:  Future Appointments   Date Time Provider Department Center   4/17/2024  3:30 PM Abraham Cash MD Neuro [facility] Neurology -   5/6/2024 11:00 AM Slime Baker, APRN - CNP [facility]       Health Maintenance   Topic Date Due    Shingles vaccine (1 of 2) Never done    Respiratory Syncytial Virus (RSV) Pregnant or age 60 yrs+ (1 - 1-dose 60+ series) Never done    Colorectal Cancer Screen  09/19/2018    COVID-19 Vaccine (6 - 2023-24 season) 09/01/2023    Hepatitis B vaccine (2 of 3 - 19+ 3-dose series) 12/14/2023    Annual Wellness Visit (Medicare Advantage)  01/01/2024    Lipids  12/15/2024    Depression Monitoring  01/26/2025    DTaP/Tdap/Td vaccine (2 - Td or Tdap) 11/16/2033    Flu vaccine  Completed    Pneumococcal 65+ years Vaccine  Completed    Hepatitis A vaccine  Aged Out    Hib vaccine  Aged Out    Polio vaccine  Aged Out    Meningococcal (ACWY) vaccine  Aged Out       Hemoglobin A1C (%)   Date Value   11/06/2023 6.4 (H)   06/08/2022 6.4 (H)   10/04/2021 6.0             ( goal A1C is < 7)   No components found for: \"LABMICR\"  LDL Cholesterol (mg/dL)   Date Value   12/15/2023 16   06/18/2022 47     LDL Calculated (mg/dL)   Date Value   01/08/2018 58       (goal LDL is <100)   AST (U/L)   Date Value   06/14/2023 23     ALT (U/L)   Date Value   06/14/2023 13     BUN (mg/dL)   Date Value   03/06/2024 15     BP Readings from Last 3 Encounters:   03/18/24 116/68   03/07/24 116/68   02/12/24 135/77          (goal 120/80)    All Future Testing planned in [facility]  Lab Frequency Next Occurrence   Basic Metabolic Panel Once 07/19/2023   CBC with Auto Differential Once 07/19/2023   Magnesium Once 07/19/2023   Phosphorus Once 07/19/2023   Urinalysis with Microscopic Once 07/19/2023   US ABDOMEN LIMITED Once 07/19/2023   AK ARTHROCENTESIS ASPIR&/INJ MAJOR JT/BURSA W/O US Once 01/12/2024

## 2024-03-27 ENCOUNTER — CARE COORDINATION (OUTPATIENT)
Dept: CARE COORDINATION | Age: 84
End: 2024-03-27

## 2024-03-27 NOTE — CARE COORDINATION
Ambulatory Care Coordination Note  3/27/2024    Patient Current Location:  [place]: [place] [address]     ACM contacted the patient by telephone. Verified name and  with patient as identifiers. Provided introduction to self, and explanation of the ACM role.     Challenges to be reviewed by the provider   Additional needs identified to be addressed with provider: No  none               Method of communication with provider: staff message.    ACM: Robin Perry RN    CC Plan:       Review medications with Patient     2.   Patient receives home Physical therapy.     3.   Patient missed appointment with PCP on 3/8/2024.    Review upcoming appointments with Patient:    Future Appointments   Date Time Provider Department Center   2024  3:30 PM Abraham Cash MD Neuro [facility] Neurology -   2024 11:00 AM Slime Baker APRN - CNP [facility] Neuro [facility]      5.  Patient presented to ED on 3/6/24 with chest pain.  He was admitted.  He was discharged on 3/7/24, Discharge Diagnosis:     Acute chest pain due to nonischemic causes  Improved with rest    6.  Plan to possibly graduate Patient on next telephone encounter if no concerns    Offered patient enrollment in the Remote Patient Monitoring (RPM) program for in-home monitoring: Patient is not eligible for RPM program.    Writer spoke with Patient and his significant other, Ms. Yarbrough, via speaker phone.  They were about to each lunch.  Ms. Yarbrough states Patient has all of his medications.  They were not reviewed at this time due to them sitting down for lunch.      Ms. Yarbrough and Patient were informed he was a no show for his appointment with PCP on 3/8/2024.  Writer recommends they call office to reschedule the appointment.  They were informed Patient has an upcoming appointment on 24 with Neurologist at 3:30 pm.    Ms. Yarbrough states Patient receives home PT and OT.    Patient states he has a little bit of chest discomfort.  Ms. Yarbrough states Patient

## 2024-04-02 ENCOUNTER — HOSPITAL ENCOUNTER (OUTPATIENT)
Age: 84
Setting detail: OUTPATIENT SURGERY
Discharge: HOME | End: 2024-04-02
Attending: INTERNAL MEDICINE | Admitting: INTERNAL MEDICINE
Payer: MEDICARE

## 2024-04-02 VITALS
TEMPERATURE: 98 F | OXYGEN SATURATION: 95 % | RESPIRATION RATE: 20 BRPM | BODY MASS INDEX: 31.49 KG/M2 | WEIGHT: 189 LBS | HEART RATE: 83 BPM | DIASTOLIC BLOOD PRESSURE: 51 MMHG | SYSTOLIC BLOOD PRESSURE: 120 MMHG | HEIGHT: 65 IN

## 2024-04-02 DIAGNOSIS — I20.0 PROGRESSIVE ANGINA (HCC): Primary | ICD-10-CM

## 2024-04-02 DIAGNOSIS — R94.39 ABNORMAL STRESS TEST: ICD-10-CM

## 2024-04-02 LAB
BUN BLD-MCNC: 16 MG/DL (ref 8–26)
CHLORIDE BLD-SCNC: 102 MMOL/L (ref 98–107)
ECHO BSA: 1.98 M2
EGFR, POC: 54 ML/MIN/1.73M2
GLUCOSE BLD-MCNC: 139 MG/DL (ref 74–100)
HCT VFR BLD AUTO: 47 % (ref 41–53)
PLATELET # BLD AUTO: 190 K/UL (ref 138–453)
POC CREATININE: 1.3 MG/DL (ref 0.51–1.19)
POC HEMOGLOBIN (CALC): 15.9 G/DL (ref 13.5–17.5)
POTASSIUM BLD-SCNC: 4.8 MMOL/L (ref 3.5–4.5)
SODIUM BLD-SCNC: 140 MMOL/L (ref 138–146)

## 2024-04-02 PROCEDURE — 82435 ASSAY OF BLOOD CHLORIDE: CPT

## 2024-04-02 PROCEDURE — 2500000003 HC RX 250 WO HCPCS: Performed by: INTERNAL MEDICINE

## 2024-04-02 PROCEDURE — 7100000010 HC PHASE II RECOVERY - FIRST 15 MIN: Performed by: INTERNAL MEDICINE

## 2024-04-02 PROCEDURE — 85014 HEMATOCRIT: CPT

## 2024-04-02 PROCEDURE — C1760 CLOSURE DEV, VASC: HCPCS | Performed by: INTERNAL MEDICINE

## 2024-04-02 PROCEDURE — 84132 ASSAY OF SERUM POTASSIUM: CPT

## 2024-04-02 PROCEDURE — 85049 AUTOMATED PLATELET COUNT: CPT

## 2024-04-02 PROCEDURE — 82947 ASSAY GLUCOSE BLOOD QUANT: CPT

## 2024-04-02 PROCEDURE — 2580000003 HC RX 258: Performed by: INTERNAL MEDICINE

## 2024-04-02 PROCEDURE — 6360000002 HC RX W HCPCS: Performed by: INTERNAL MEDICINE

## 2024-04-02 PROCEDURE — 82565 ASSAY OF CREATININE: CPT

## 2024-04-02 PROCEDURE — 84295 ASSAY OF SERUM SODIUM: CPT

## 2024-04-02 PROCEDURE — 93452 LEFT HRT CATH W/VENTRCLGRPHY: CPT | Performed by: INTERNAL MEDICINE

## 2024-04-02 PROCEDURE — C1769 GUIDE WIRE: HCPCS | Performed by: INTERNAL MEDICINE

## 2024-04-02 PROCEDURE — 84520 ASSAY OF UREA NITROGEN: CPT

## 2024-04-02 PROCEDURE — 93454 CORONARY ARTERY ANGIO S&I: CPT | Performed by: INTERNAL MEDICINE

## 2024-04-02 PROCEDURE — C1894 INTRO/SHEATH, NON-LASER: HCPCS | Performed by: INTERNAL MEDICINE

## 2024-04-02 PROCEDURE — 36415 COLL VENOUS BLD VENIPUNCTURE: CPT

## 2024-04-02 PROCEDURE — 2709999900 HC NON-CHARGEABLE SUPPLY: Performed by: INTERNAL MEDICINE

## 2024-04-02 PROCEDURE — 99152 MOD SED SAME PHYS/QHP 5/>YRS: CPT | Performed by: INTERNAL MEDICINE

## 2024-04-02 PROCEDURE — 7100000011 HC PHASE II RECOVERY - ADDTL 15 MIN: Performed by: INTERNAL MEDICINE

## 2024-04-02 PROCEDURE — 6360000004 HC RX CONTRAST MEDICATION: Performed by: INTERNAL MEDICINE

## 2024-04-02 RX ORDER — ACETAMINOPHEN 325 MG/1
650 TABLET ORAL EVERY 4 HOURS PRN
Status: DISCONTINUED | OUTPATIENT
Start: 2024-04-02 | End: 2024-04-02 | Stop reason: HOSPADM

## 2024-04-02 RX ORDER — SODIUM CHLORIDE 0.9 % (FLUSH) 0.9 %
5-40 SYRINGE (ML) INJECTION EVERY 12 HOURS SCHEDULED
Status: DISCONTINUED | OUTPATIENT
Start: 2024-04-02 | End: 2024-04-02 | Stop reason: HOSPADM

## 2024-04-02 RX ORDER — DIPHENHYDRAMINE HYDROCHLORIDE 50 MG/ML
50 INJECTION INTRAMUSCULAR; INTRAVENOUS ONCE
Status: COMPLETED | OUTPATIENT
Start: 2024-04-02 | End: 2024-04-02

## 2024-04-02 RX ORDER — SODIUM CHLORIDE 0.9 % (FLUSH) 0.9 %
5-40 SYRINGE (ML) INJECTION PRN
Status: DISCONTINUED | OUTPATIENT
Start: 2024-04-02 | End: 2024-04-02 | Stop reason: HOSPADM

## 2024-04-02 RX ORDER — SODIUM CHLORIDE 9 MG/ML
INJECTION, SOLUTION INTRAVENOUS PRN
Status: DISCONTINUED | OUTPATIENT
Start: 2024-04-02 | End: 2024-04-02 | Stop reason: HOSPADM

## 2024-04-02 RX ORDER — SODIUM CHLORIDE 9 MG/ML
INJECTION, SOLUTION INTRAVENOUS CONTINUOUS
Status: DISCONTINUED | OUTPATIENT
Start: 2024-04-02 | End: 2024-04-02 | Stop reason: HOSPADM

## 2024-04-02 RX ORDER — METHYLPREDNISOLONE SODIUM SUCCINATE 125 MG/2ML
INJECTION, POWDER, LYOPHILIZED, FOR SOLUTION INTRAMUSCULAR; INTRAVENOUS PRN
Status: DISCONTINUED | OUTPATIENT
Start: 2024-04-02 | End: 2024-04-02 | Stop reason: HOSPADM

## 2024-04-02 RX ORDER — MIDAZOLAM HYDROCHLORIDE 1 MG/ML
INJECTION INTRAMUSCULAR; INTRAVENOUS PRN
Status: DISCONTINUED | OUTPATIENT
Start: 2024-04-02 | End: 2024-04-02 | Stop reason: HOSPADM

## 2024-04-02 RX ADMIN — METHYLPREDNISOLONE SODIUM SUCCINATE 125 MG: 125 INJECTION INTRAMUSCULAR; INTRAVENOUS at 12:25

## 2024-04-02 RX ADMIN — SODIUM CHLORIDE: 0.9 INJECTION, SOLUTION INTRAVENOUS at 12:14

## 2024-04-02 RX ADMIN — DIPHENHYDRAMINE HYDROCHLORIDE 50 MG: 50 INJECTION INTRAMUSCULAR; INTRAVENOUS at 12:27

## 2024-04-02 NOTE — PROGRESS NOTES
All discharge instructions reviewed, questions answered. Pulses obtained & unchanged. Pt ambulatory. Site clean dry and intact. No bleeding, hematoma, or bruising noted. Patient discharged with all belongings. Patient discharged via wheelchair by writer .

## 2024-04-02 NOTE — H&P
procedure.    Pre Procedure Conscious Sedation Data:     ASA Class:                  [] I [x] II [] III [] IV     Mallampati Class:       [] I [x] II [] III [] IV

## 2024-04-02 NOTE — PROGRESS NOTES
Patient returned to PCC room post  procedure.  Assessment & VS obtained. Restrictions/Education reviewed with patient. Post procedure pathway initiated. Pt without complications.  Groin site soft, dry & no hematoma noted. RN at bedside. Supine position with BLE straight. Pulses obtained and documented. Will continue to monitor.

## 2024-04-02 NOTE — DISCHARGE INSTRUCTIONS
veggies in them, such as stir-fried dishes and soups.  Read food labels and try to avoid saturated fat and trans fat. They increase your risk of heart disease. Bake, broil, grill, or steam foods instead of frying them. Use olive or canola oil when you cook. Try cholesterol-lowering spreads, such as Benecol or Take Control.  Limit sodium. Your doctor may recommend that you limit sodium to less than 1,500 mg a day.  Limit processed foods, including cookies and crackers.  Limit drinks and foods with added sugar.  Choose low-fat or fat-free milk and dairy products.  Limit alcohol to 2 drinks a day for men and 1 drink a day for women. Too much alcohol can cause health problems.  If your doctor recommends it, get more exercise. Walking is a good choice. Bit by bit, increase the amount you walk every day. Try for at least 30 minutes on most days of the week. You also may want to swim, bike, or do other activities.  Stay at a healthy weight. Lose weight if you need to.  Talk to your family, friends, or a therapist about your feelings. It is normal to feel upset about having this disease and to feel afraid of having a heart attack. Talking openly about your feelings can help you cope. If you think you have symptoms of depression, talk to your doctor.  Avoid colds and flu. Get a pneumococcal vaccine shot. If you have had one before, ask your doctor whether you need another dose. Get a flu vaccine every year. If you must be around people with colds or flu, wash your hands often.  When should you call for help?  Call 911 anytime you think you may need emergency care. For example, call if:  You have symptoms of a heart attack. These may include:  Chest pain or pressure, or a strange feeling in the chest.  Sweating.  Shortness of breath.  Nausea or vomiting.  Pain, pressure, or a strange feeling in the back, neck, jaw, or upper belly or in one or both shoulders or arms.  Lightheadedness or sudden weakness.  A fast or irregular

## 2024-04-02 NOTE — PROGRESS NOTES
Patient admitted, consent signed and questions answered.  Call light to reach with side rails up 2 of 2. Bilateral Groin clipped with writer and Svetlana RN present.  RN at bedside with patient.  History and physical up to date. VS & Pulses obtained and documented. Will continue to monitor.    1225- Solu-Medrol and Benadryl given.

## 2024-04-08 ENCOUNTER — CARE COORDINATION (OUTPATIENT)
Dept: CARE COORDINATION | Age: 84
End: 2024-04-08

## 2024-04-08 NOTE — CARE COORDINATION
HC phoned the patient to find out if he will need transportation for his upcoming appointment on 04/17/24 @ 3:30p.  Patient stated that he's not sure if his family is taking him to the appointment or if he needs a ride.    Plan of Care  HC will assist patient if he needs a ride or not.

## 2024-04-12 ENCOUNTER — CARE COORDINATION (OUTPATIENT)
Dept: CARE COORDINATION | Age: 84
End: 2024-04-12

## 2024-04-12 NOTE — CARE COORDINATION
HC phoned the patient to inquire if he had his transportation plans in place.  Patient stated that he thinks he has transportation but not sure.  HC asked that the patient will contact her by 04/15/24, if her needs transportation.    Plan of Care  HC will follow up with patient if no response by 04/15/24, AM..

## 2024-04-15 ENCOUNTER — CARE COORDINATION (OUTPATIENT)
Dept: CARE COORDINATION | Age: 84
End: 2024-04-15

## 2024-04-15 NOTE — CARE COORDINATION
HC attempted to contact the patient to verify that he has transportation for 04/17/24, to his appointment.  There was no answer, HC left a message for the patient.    Plan of Care  HC will speak with patient at another time

## 2024-04-15 NOTE — CARE COORDINATION
Patient called back to state that his friend is going to take him to his appointment.    Plan of Care  HC will speak with regarding his appointment for 05/06/24, for transportation.

## 2024-04-16 ENCOUNTER — CARE COORDINATION (OUTPATIENT)
Dept: CARE COORDINATION | Age: 84
End: 2024-04-16

## 2024-04-16 NOTE — CARE COORDINATION
Ambulatory Care Coordination Note  4/16/2024      ACM attempted to contact the patient by telephone.  Message left on voice mail requesting a return call.  Writer's contact information provided.    Method of communication with provider: staff message.    ACM: Robin Perry RN    CC Plan:       Review medications with Patient     2.   Patient receives home Physical therapy.     3.   Patient missed appointment with PCP on 3/8/2024.    Review upcoming appointments with Patient:    Future Appointments   Date Time Provider Department Center   4/17/2024  3:30 PM Abraham Cash MD Neuro [facility] Neurology -   5/6/2024 11:00 AM Slime Baker APRN - CNP [facility]     4.  Patient had left heart cath/coronary angiography on 4/2/2024 for c/o angina.  Writer reviewed results.    Offered patient enrollment in the Remote Patient Monitoring (RPM) program for in-home monitoring: Patient is not eligible for RPM program.    Lab Results       None            Care Coordination Interventions    Referral from Primary Care Provider: Yes  Suggested Interventions and Community Resources  Fall Risk Prevention: Completed  Disease Specific Clinic: Completed (Comment: Dr. Ross Garcia, GI;  Dr. Chuy Bailey, Pain Mangement; Dr. Lobito Tracy, Cardiologist; ERIC Lyn, Neurology)  Home Care Waiver: Completed  Home Health Services: Completed (Comment: [facility] visits Patient weekly)  Meals on Wheels: Completed  Physical Therapy: Completed (Comment: Patient receives home Physical Therapy)  Social Work: Completed (Comment: [facility])  Transportation Support: Completed (Comment: [facility], assist with transportation arrangements for appointments)  [employer] Tools: Completed          Goals Addressed    None         Future Appointments   Date Time Provider Department Center   4/17/2024  3:30 PM Abraham Cash MD Neuro [facility] Neurology -   5/6/2024 11:00 AM Slime Baker APRN -

## 2024-04-17 ENCOUNTER — OFFICE VISIT (OUTPATIENT)
Dept: NEUROLOGY | Age: 84
End: 2024-04-17

## 2024-04-17 VITALS
HEIGHT: 65 IN | BODY MASS INDEX: 30.16 KG/M2 | SYSTOLIC BLOOD PRESSURE: 125 MMHG | HEART RATE: 62 BPM | WEIGHT: 181 LBS | DIASTOLIC BLOOD PRESSURE: 78 MMHG

## 2024-04-17 DIAGNOSIS — K11.7 DROOLING: ICD-10-CM

## 2024-04-17 DIAGNOSIS — G20.A1 PARKINSON'S DISEASE WITHOUT FLUCTUATING MANIFESTATIONS, UNSPECIFIED WHETHER DYSKINESIA PRESENT (HCC): Primary | ICD-10-CM

## 2024-04-17 ASSESSMENT — ENCOUNTER SYMPTOMS
SHORTNESS OF BREATH: 0
DIARRHEA: 0
ABDOMINAL PAIN: 0
PHOTOPHOBIA: 0
VOICE CHANGE: 0
VOMITING: 0
TROUBLE SWALLOWING: 0
CHEST TIGHTNESS: 0

## 2024-04-17 NOTE — PROGRESS NOTES
Alcohol use: No    Drug use: No    Sexual activity: Not Currently   Other Topics Concern    Not on file   Social History Narrative    Not on file     Social Determinants of Health     Financial Resource Strain: Low Risk  (7/19/2023)    Overall Financial Resource Strain (CARDIA)     Difficulty of Paying Living Expenses: Not hard at all   Food Insecurity: Not on file (7/19/2023)   Transportation Needs: Unknown (7/19/2023)    PRAPARE - Transportation     Lack of Transportation (Medical): Not on file     Lack of Transportation (Non-Medical): No   Physical Activity: Insufficiently Active (11/16/2023)    Exercise Vital Sign     Days of Exercise per Week: 1 day     Minutes of Exercise per Session: 10 min   Stress: Stress Concern Present (6/17/2022)    [employer] [place] [employer] - Occupational Stress Questionnaire     Feeling of Stress : To some extent   Social Connections: Socially Isolated (6/17/2022)    Social Connection and Isolation Panel [NHANES]     Frequency of Communication with Friends and Family: Three times a week     Frequency of Social Gatherings with Friends and Family: Not on file     Attends [employer] Services: Never     Active Member of Clubs or Organizations: No     Attends Club or Organization Meetings: Never     Marital Status:    Intimate Partner Violence: Not At Risk (6/17/2022)    Humiliation, Afraid, Rape, and Kick questionnaire     Fear of Current or Ex-Partner: No     Emotionally Abused: No     Physically Abused: No     Sexually Abused: No   Housing Stability: Unknown (7/19/2023)    Housing Stability Vital Sign     Unable to Pay for Housing in the Last Year: Not on file     Number of Places Lived in the Last Year: Not on file     Unstable Housing in the Last Year: No        Current Outpatient Medications   Medication Sig Dispense Refill    pantoprazole (PROTONIX) 20 MG tablet TAKE ONE (1) TABLET BY MOUTH ONCE DAILY FOR GERD (Patient taking differently: Take 2 tablets by mouth

## 2024-04-22 ENCOUNTER — CARE COORDINATION (OUTPATIENT)
Dept: CARE COORDINATION | Age: 84
End: 2024-04-22

## 2024-04-22 NOTE — CARE COORDINATION
Ambulatory Care Coordination Note  4/22/2024      ACM attempted to contact the patient by telephone.  Message left on voice mail of Patient and significant other requesting return call.  Writer's contact information provided.    Method of communication with provider: staff message.    ACM: Robin Perry RN    CC Plan:       Review medications with Patient     2.   Patient receives home Physical therapy.     3.   Patient missed appointment with PCP on 3/8/2024.    Review upcoming appointments with Patient:            Future Appointments   Date Time Provider Department Center       Neurology -   5/6/2024 11:00 AM Slime Baker APRN - CNP [facility]      4.  Patient had left heart cath/coronary angiography on 4/2/2024 for c/o angina.  Writer reviewed results.    5.  Patient kept appointment with Dr. Abraham Cash on 4/17/2024.  He recommends a referral to a movement specialist/neurologist; referral to speech therapy for drooling, may consider Rx for Robinul at next visit.    Offered patient enrollment in the Remote Patient Monitoring (RPM) program for in-home monitoring: Patient is not eligible for RPM program.    Lab Results       None            Care Coordination Interventions    Referral from Primary Care Provider: Yes  Suggested Interventions and Community Resources  Fall Risk Prevention: Completed  Disease Specific Clinic: Completed (Comment: Dr. Ross Garcia, GI;  Dr. Chuy Bailey, Pain Mangement; Dr. Lobito Tracy, Cardiologist; ERIC Lyn, Neurology)  Home Care Waiver: Completed  Home Health Services: Completed (Comment: Elba from [facility] visits Patient weekly)  Meals on Wheels: Completed  Physical Therapy: Completed (Comment: Patient receives home Physical Therapy)  Social Work: Completed (Comment: ZEB Win)  Transportation Support: Completed (Comment: ZEB Win, assist with transportation arrangements for appointments)  Zone Management Tools:

## 2024-04-29 ENCOUNTER — CARE COORDINATION (OUTPATIENT)
Dept: CARE COORDINATION | Age: 84
End: 2024-04-29

## 2024-04-29 NOTE — CARE COORDINATION
HC phoned patient to inform/remind of his appointment on Monday, 05/06/24, with valerio Neurosurgeon, @ [address],  [address].  Patient's aide stated that she will be available to assist the patient with transportation.    Plan of Care  HC will follow up with patient for other social needs.

## 2024-04-30 ENCOUNTER — TELEPHONE (OUTPATIENT)
Dept: FAMILY MEDICINE CLINIC | Age: 84
End: 2024-04-30

## 2024-04-30 NOTE — TELEPHONE ENCOUNTER
Reason for referral request? Neurology for symptoms of Parkinson's   Provider patient wants to be referred to(if known):     Provider Phone Number(if known):[phone]     Writer looked in patient chart seen that the patient has external referral for neurology from Dr. Cash back on 4-17-24. Writer called the patient no answer left message informing patient that Dr. Cash order an external referral for parkinson's so the patient can go to whom every take his insurance.

## 2024-05-02 ENCOUNTER — CARE COORDINATION (OUTPATIENT)
Dept: CARE COORDINATION | Age: 84
End: 2024-05-02

## 2024-05-02 NOTE — CARE COORDINATION
and goes. He denies shortness of breath.   Patient was reminded to take Nitroglycerin SL tablet for chest pain.  He was informed he can take up to a maximum of 3 tabs.  Writer instructed Patient to call 911 if no relief with Nitroglycerin.  Writer unsure how much Patient understands and there is no one home to review prescription with.    Patient was told to call 911 if he has chest pain.      Writer plans to follow up with Patient next week.        Lab Results       None            Care Coordination Interventions    Referral from Primary Care Provider: Yes  Suggested Interventions and Community Resources  Fall Risk Prevention: Completed  Disease Specific Clinic: Completed (Comment: Dr. Ross Garcia, GI;  Dr. Chuy Bailey, Pain Mangement; Dr. Lobito Tracy, Cardiologist; ERIC Lyn, Neurology)  Home Care Waiver: Completed  Home Health Services: Completed (Comment: [facility] visits Patient weekly)  Meals on Wheels: Completed  Physical Therapy: Completed (Comment: Patient receives home Physical Therapy)  Social Work: Completed (Comment: ZEB Win)  Transportation Support: Completed (Comment: ZEB Win, assist with transportation arrangements for appointments)  Zone Management Tools: Completed          Goals Addressed    None         Future Appointments   Date Time Provider Department Center   5/6/2024 11:00 AM Slime Baker APRN - CNP [facility]   5/8/2024  1[facility]15 [facility]   10/29/2024  1:00 PM Abraham Cash MD Neuro [facility] Neurology -

## 2024-05-09 ENCOUNTER — OFFICE VISIT (OUTPATIENT)
Dept: NEUROSURGERY | Age: 84
End: 2024-05-09
Payer: MEDICARE

## 2024-05-09 VITALS
TEMPERATURE: 97.6 F | WEIGHT: 181 LBS | HEART RATE: 63 BPM | SYSTOLIC BLOOD PRESSURE: 121 MMHG | OXYGEN SATURATION: 97 % | RESPIRATION RATE: 18 BRPM | DIASTOLIC BLOOD PRESSURE: 72 MMHG | BODY MASS INDEX: 30.12 KG/M2

## 2024-05-09 DIAGNOSIS — M47.816 LUMBAR FACET ARTHROPATHY: ICD-10-CM

## 2024-05-09 DIAGNOSIS — M43.16 SPONDYLOLISTHESIS, LUMBAR REGION: ICD-10-CM

## 2024-05-09 DIAGNOSIS — M47.26 OTHER SPONDYLOSIS WITH RADICULOPATHY, LUMBAR REGION: Primary | ICD-10-CM

## 2024-05-09 PROCEDURE — 99214 OFFICE O/P EST MOD 30 MIN: CPT | Performed by: NURSE PRACTITIONER

## 2024-05-09 PROCEDURE — 3074F SYST BP LT 130 MM HG: CPT | Performed by: NURSE PRACTITIONER

## 2024-05-09 PROCEDURE — 3078F DIAST BP <80 MM HG: CPT | Performed by: NURSE PRACTITIONER

## 2024-05-09 PROCEDURE — 1123F ACP DISCUSS/DSCN MKR DOCD: CPT | Performed by: NURSE PRACTITIONER

## 2024-05-09 NOTE — PROGRESS NOTES
[facility] NEUROSURGERY  [address] [place] [address], 1ST FLOOR  [facility] [address]  Dept: [phone]    Patient:  Mina Gill  YOB: 1940  Date: 5/9/24    The patient is a 83 y.o. male who presents today for consult of the following problems:     Chief Complaint   Patient presents with    Follow-up         HPI:     Mina Gill is a 83 y.o. male who presents for follow up of back pain.  Since last office visit, patient has been able to complete upright x-rays, has also been undergoing extensive home physical therapy.  Reports they have been working on his lower back and legs.  Patient does typically ambulate with the use of a cane.  Will use a wheelchair for any long distances.    Does report persistent 5/10 pain to lower back, will radiate into both legs in nondermatomal distributions. States that he has to be very careful while ambulating as he will at times feel as though his legs are going to give out on him.  Patient reports that the pain never fully goes away.  Does have known history significant for Parkinson's, does follow with neurology for this.    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 ([facility]) 3/18/2011    d/t being hurt on the job    CKD (chronic kidney disease) stage 2, GFR 60-89 ml/min     CKD (chronic kidney disease) stage 3, GFR 30-59 ml/min ([facility])     Diverticulosis of colon     GERD (gastroesophageal reflux disease)     History of non-ST elevation myocardial infarction (NSTEMI) 6/2022 10/27/2022    Impaired renal function     MRSA (methicillin resistant staph aureus) culture positive 9/23/2015    leg    Osteoarthritis of knee     Left    Parkinson disease ([facility])     Proteinuria     Skull fracture ([facility]) 3/18/2011    d/t 12-foot drop on the job    Temporary loss of eyesight     periodically, happened x7    Tubular adenoma of colon 2012, 2017    isaiah     Past Surgical

## 2024-05-10 ENCOUNTER — OFFICE VISIT (OUTPATIENT)
Dept: FAMILY MEDICINE CLINIC | Age: 84
End: 2024-05-10
Payer: MEDICARE

## 2024-05-10 VITALS
BODY MASS INDEX: 30.89 KG/M2 | HEART RATE: 66 BPM | HEIGHT: 65 IN | WEIGHT: 185.4 LBS | DIASTOLIC BLOOD PRESSURE: 64 MMHG | SYSTOLIC BLOOD PRESSURE: 107 MMHG

## 2024-05-10 DIAGNOSIS — R51.9 CHRONIC NONINTRACTABLE HEADACHE, UNSPECIFIED HEADACHE TYPE: ICD-10-CM

## 2024-05-10 DIAGNOSIS — M17.12 PRIMARY OSTEOARTHRITIS OF LEFT KNEE: Primary | ICD-10-CM

## 2024-05-10 DIAGNOSIS — G89.29 CHRONIC NONINTRACTABLE HEADACHE, UNSPECIFIED HEADACHE TYPE: ICD-10-CM

## 2024-05-10 DIAGNOSIS — G20.A1 PARKINSON'S DISEASE, UNSPECIFIED WHETHER DYSKINESIA PRESENT, UNSPECIFIED WHETHER MANIFESTATIONS FLUCTUATE (HCC): ICD-10-CM

## 2024-05-10 PROCEDURE — 1123F ACP DISCUSS/DSCN MKR DOCD: CPT

## 2024-05-10 PROCEDURE — 3074F SYST BP LT 130 MM HG: CPT

## 2024-05-10 PROCEDURE — 3078F DIAST BP <80 MM HG: CPT

## 2024-05-10 PROCEDURE — 99213 OFFICE O/P EST LOW 20 MIN: CPT

## 2024-05-10 ASSESSMENT — ENCOUNTER SYMPTOMS
WHEEZING: 0
COUGH: 0
CONSTIPATION: 0
SORE THROAT: 0
DIARRHEA: 0
VOMITING: 0
SHORTNESS OF BREATH: 0
EYE PAIN: 0
NAUSEA: 0
ABDOMINAL PAIN: 0

## 2024-05-10 ASSESSMENT — PATIENT HEALTH QUESTIONNAIRE - PHQ9
2. FEELING DOWN, DEPRESSED OR HOPELESS: NOT AT ALL
7. TROUBLE CONCENTRATING ON THINGS, SUCH AS READING THE NEWSPAPER OR WATCHING TELEVISION: NOT AT ALL
4. FEELING TIRED OR HAVING LITTLE ENERGY: NOT AT ALL
6. FEELING BAD ABOUT YOURSELF - OR THAT YOU ARE A FAILURE OR HAVE LET YOURSELF OR YOUR FAMILY DOWN: NOT AT ALL
SUM OF ALL RESPONSES TO PHQ9 QUESTIONS 1 & 2: 0
1. LITTLE INTEREST OR PLEASURE IN DOING THINGS: NOT AT ALL
9. THOUGHTS THAT YOU WOULD BE BETTER OFF DEAD, OR OF HURTING YOURSELF: NOT AT ALL
SUM OF ALL RESPONSES TO PHQ QUESTIONS 1-9: 0
10. IF YOU CHECKED OFF ANY PROBLEMS, HOW DIFFICULT HAVE THESE PROBLEMS MADE IT FOR YOU TO DO YOUR WORK, TAKE CARE OF THINGS AT HOME, OR GET ALONG WITH OTHER PEOPLE: NOT DIFFICULT AT ALL
8. MOVING OR SPEAKING SO SLOWLY THAT OTHER PEOPLE COULD HAVE NOTICED. OR THE OPPOSITE, BEING SO FIGETY OR RESTLESS THAT YOU HAVE BEEN MOVING AROUND A LOT MORE THAN USUAL: NOT AT ALL
5. POOR APPETITE OR OVEREATING: NOT AT ALL
SUM OF ALL RESPONSES TO PHQ QUESTIONS 1-9: 0
SUM OF ALL RESPONSES TO PHQ QUESTIONS 1-9: 0
3. TROUBLE FALLING OR STAYING ASLEEP: NOT AT ALL
SUM OF ALL RESPONSES TO PHQ QUESTIONS 1-9: 0

## 2024-05-10 NOTE — PROGRESS NOTES
Attending Physician Statement  I have discussed the care of Mina Gill, including pertinent history and exam findings,  with the resident. I have reviewed the key elements of all parts of the encounter with the resident.  I agree with the assessment, plan and orders as documented by the resident.  (GE Modifier)    El Gold MD  
Visit Information    Have you changed or started any medications since your last visit including any over-the-counter medicines, vitamins, or herbal medicines? no   Are you having any side effects from any of your medications? -  no  Have you stopped taking any of your medications? Is so, why? -  no    Have you seen any other physician or provider since your last visit? No  Have you had any other diagnostic tests since your last visit? No  Have you been seen in the emergency room and/or had an admission to a hospital since we last saw you? No  Have you had your routine dental cleaning in the past 6 months? no    Have you activated your [employer] account? If not, what are your barriers? Yes     Patient Care Team:  Jamal aBrnes MD as PCP - General (Family Medicine)  Maria Elena Farooq MD (Neurology)  Felisa John MD (Nephrology)  Bo Lindsey MD as Consulting Physician (Internal Medicine Cardiovascular Disease)  Ross Garcia MD as Consulting Physician (Gastroenterology)  Delfin Frausto as Health   Robin Perry RN as Ambulatory Care Manager  Slime Baker APRN - CNP as Nurse Practitioner (Nurse Practitioner)  Chuy aBiley MD as Consulting Physician (Pain Management)    Medical History Review  Past Medical, Family, and Social History reviewed and does not contribute to the patient presenting condition    Health Maintenance   Topic Date Due    Shingles vaccine (1 of 2) Never done    Respiratory Syncytial Virus (RSV) Pregnant or age 60 yrs+ (1 - 1-dose 60+ series) Never done    Colorectal Cancer Screen  09/19/2018    COVID-19 Vaccine (6 - 2023-24 season) 09/01/2023    Hepatitis B vaccine (2 of 3 - 19+ 3-dose series) 12/14/2023    Annual Wellness Visit (Medicare Advantage)  01/01/2024    Lipids  12/15/2024    Depression Monitoring  01/26/2025    DTaP/Tdap/Td vaccine (2 - Td or Tdap) 11/16/2033    Flu vaccine  Completed    Pneumococcal 65+ years Vaccine  Completed    Hepatitis A 
alert.      Comments: Chronic resting tremor bilateral hand          An electronic signature was used to authenticate this note.    --Jamal Barnes MD

## 2024-05-10 NOTE — PATIENT INSTRUCTIONS
Thank you for letting us take care of you today. We hope all your questions were addressed. If a question was overlooked or something else comes to mind after you return home, please contact a member of your Care Team listed below.      Your Care Team at [facility] is Team #  Scott Bell, (Faculty)  Oswaldo Blount (Resident)  Oswaldo Scott (Resident)   Gila Castillo (Resident)  Jamal Barnes (Resident)  Graciela Delatorre., [facility]  Nicolette Freeman., [facility]  Jacquelyn Starr, [facility]  Kaitlynn Toney, [facility]  Girma Fernandez, [facility]  Noemy Matias, [facility]  Bernadette Stewart, [facility]  Regina Kay, BHAVNA Hodgson (LJ) SALVATORE Ramirez (Clinical Practice Manager)  Mavis Ball [facility] (Clinical Pharmacist)     Office phone number: [phone]    If you need to get in right away due to illness, please be advised we have \"Same Day\" appointments available Monday-Friday. Please call us at [phone] option #3 to schedule your \"Same Day\" appointment.

## 2024-05-13 ENCOUNTER — CARE COORDINATION (OUTPATIENT)
Dept: CARE COORDINATION | Age: 84
End: 2024-05-13

## 2024-05-13 RX ORDER — LATANOPROST 50 UG/ML
SOLUTION/ DROPS OPHTHALMIC
Qty: 2.5 ML | Refills: 3 | OUTPATIENT
Start: 2024-05-13

## 2024-05-13 RX ORDER — ISOSORBIDE MONONITRATE 30 MG/1
30 TABLET, EXTENDED RELEASE ORAL DAILY
Qty: 90 TABLET | Refills: 0 | Status: SHIPPED | OUTPATIENT
Start: 2024-05-13

## 2024-05-13 RX ORDER — AMITRIPTYLINE HYDROCHLORIDE 10 MG/1
TABLET, FILM COATED ORAL
Qty: 90 TABLET | Refills: 0 | Status: SHIPPED | OUTPATIENT
Start: 2024-05-13

## 2024-05-13 NOTE — CARE COORDINATION
Ambulatory Care Coordination Note  5/13/2024      ACM attempted to contact the patient by telephone. Patient's voice mail is full.  Writer unable to leave a message.    Method of communication with provider: staff message.    ACM: Robin Perry RN    CC Plan:       Medications are in pill packages.     2.   Patient receives home Physical therapy.    3.  Patient had left heart cath/coronary angiography on 4/2/2024 for c/o angina.  Writer reviewed results.     4.  Patient kept appointment with Dr. Abraham Cash on 4/17/2024.  He recommends a referral to a movement specialist/neurologist; referral to speech therapy for drooling, may consider Rx for Robinul at next visit.     5.  Patient kept appointment with ERIC Lyn, Neurosurgery on 5/9/24.  She recommended a lumbar MRI.    6.  Patient kept appointment with Dr. Barnes on 5/10/2024.     Offered patient enrollment in the Remote Patient Monitoring (RPM) program for in-home monitoring: Patient is not eligible for RPM program because: insurance coverage.    Lab Results       None            Care Coordination Interventions    Referral from Primary Care Provider: Yes  Suggested Interventions and Community Resources  Fall Risk Prevention: Completed  Disease Specific Clinic: Completed (Comment: Dr. Ross Garcia, GI;  Dr. Chuy Bailey, Pain Mangement; Dr. Lobito Tracy, Cardiologist; ERIC Lyn, Neurology)  Home Care Waiver: Completed  Home Health Services: Completed (Comment: Elba from [facility] visits Patient weekly)  Meals on Wheels: Completed  Physical Therapy: Completed (Comment: Patient receives home Physical Therapy)  Social Work: Completed (Comment: ZEB Win)  Transportation Support: Completed (Comment: ZEB Win, assist with transportation arrangements for appointments)  Zone Management Tools: Completed          Goals Addressed    None         Future Appointments   Date Time Provider Department Center

## 2024-05-13 NOTE — TELEPHONE ENCOUNTER
E-scribe request for PENDED MEDICATIONS. Please review and e-scribe if applicable.     Last Visit Date:  5/10/24  Next Visit Date:  6/21/2024    Hemoglobin A1C (%)   Date Value   11/06/2023 6.4 (H)   06/08/2022 6.4 (H)   10/04/2021 6.0             ( goal A1C is < 7)   No components found for: \"LABMICR\"  No components found for: \"LDLCHOLESTEROL\", \"LDLCALC\"    (goal LDL is <100)   AST (U/L)   Date Value   06/14/2023 23     ALT (U/L)   Date Value   06/14/2023 13     BUN (mg/dL)   Date Value   03/06/2024 15     BP Readings from Last 3 Encounters:   05/10/24 107/64   05/09/24 121/72   04/17/24 125/78          (goal 120/80)        Patient Active Problem List:     Temporary loss of eyesight     Syncope : posterior circulation stroke vs Neurocardiogenic syncope      Intracranial bleed : traumatic left sub-dural hematoma ,managed conservatively in 2011     Headache     CKD (chronic kidney disease) stage 3, GFR 30-59 ml/min ([facility])     Depression     Hyperlipidemia     Microhematuria     Microalbuminuria     Essential hypertension     Generalized abdominal pain     Tubular adenoma of colon     Diverticulosis of colon     History of skull fracture     Nausea     Pure hypercholesterolemia     Prediabetes     Parkinson disease ([facility])     Chronic post-traumatic headache, not intractable     Fall (on) (from) other stairs and steps, initial encounter     Strain of iliopsoas muscle, initial encounter     Chronic pain of left knee     Closed fracture of second toe of right foot     Closed fracture of second toe of left foot     Abnormal ECG     Cerebrovascular accident (HCC)     Chest pain, unspecified     Hematoma of scalp     Left ventricular hypertrophy     Mild aortic valve regurgitation     Pulmonary hypertension, mild (HCC)     Lumbar radiculopathy     Dizziness     Hyponatremia     Vomiting     General weakness     Overweight (BMI 25.0-29.9)     Frequent falls     Symptomatic bradycardia     Unspecified inflammatory

## 2024-05-14 ENCOUNTER — CARE COORDINATION (OUTPATIENT)
Dept: CARE COORDINATION | Age: 84
End: 2024-05-14

## 2024-05-14 NOTE — CARE COORDINATION
Patient called the HC today and inquired of when we would meet to complete his ACP documents.  HC informed the patient that he was supposed to get back with his son for his availability.  Patient stated that he would talk to the patient again.  Then the patient stated that he would give his son the HC's contact information.    Plan of Care   HC will await a call from patients' son

## 2024-05-20 ENCOUNTER — CARE COORDINATION (OUTPATIENT)
Dept: CARE COORDINATION | Age: 84
End: 2024-05-20

## 2024-05-20 NOTE — CARE COORDINATION
Ambulatory Care Coordination Note  5/20/2024      [facility] attempted to contact the patient by telephone.  Message left on voice mail requesting return call.  Writer's contact information provided.    Method of communication with provider: staff message.    ACM: Robin Perry RN    CC Plan:       Medications are in pill packages.     2.   Patient receives home Physical therapy.     3.  Patient had left heart cath/coronary angiography on 4/2/2024 for c/o angina.  Writer reviewed results.     4.  Patient kept appointment with Dr. Abraham Cash on 4/17/2024.  He recommends a referral to a movement specialist/neurologist; referral to speech therapy for drooling, may consider Rx for Robinul at next visit.     5.  Patient kept appointment with ERIC Lyn, Neurosurgery on 5/9/24.  She recommended a lumbar MRI.     6.  Patient kept appointment with Dr. Barnes on 5/10/2024.    7.  Plan to graduate Patient from [facility] if no questions or concerns.    Offered patient enrollment in the Remote Patient Monitoring (RPM) program for in-home monitoring: Patient is not eligible for RPM program because: insurance coverage.    Lab Results       None                 Goals Addressed    None         Future Appointments   Date Time Provider Department Center   6/18/2024  2:00 PM Slime Baker APRN - CNP [facility]   6/21/2024 10:00 AM [facility]   10/29/2024  1:00 PM Abraham Cash MD Neuro [facility] Neurology -

## 2024-05-21 DIAGNOSIS — G20.A1 PARKINSON'S DISEASE, UNSPECIFIED WHETHER DYSKINESIA PRESENT, UNSPECIFIED WHETHER MANIFESTATIONS FLUCTUATE (HCC): ICD-10-CM

## 2024-05-21 RX ORDER — SUCRALFATE 1 G/1
TABLET ORAL
Qty: 120 TABLET | Refills: 1 | Status: SHIPPED | OUTPATIENT
Start: 2024-05-21

## 2024-05-21 RX ORDER — ROPINIROLE 0.5 MG/1
TABLET, FILM COATED ORAL
Qty: 90 TABLET | Refills: 1 | Status: SHIPPED | OUTPATIENT
Start: 2024-05-21

## 2024-05-21 NOTE — TELEPHONE ENCOUNTER
Last visit: 5/10/24  Last Med refill: 12/23/23  Does patient have enough medication for 72 hours: no    Next Visit Date:  Future Appointments   Date Time Provider Department Center   6/18/2024  2:00 PM Slime Baker APRN - CNP [facility]   6/21/2024 10:00 AM Jamal Barnes MD [facility]   10/29/2024  1:00 PM Abraham Cash MD Neuro [facility] Neurology -       Health Maintenance   Topic Date Due    Shingles vaccine (1 of 2) Never done    Respiratory Syncytial Virus (RSV) Pregnant or age 60 yrs+ (1 - 1-dose 60+ series) Never done    Colorectal Cancer Screen  09/19/2018    COVID-19 Vaccine (6 - 2023-24 season) 09/01/2023    Hepatitis B vaccine (2 of 3 - 19+ 3-dose series) 12/14/2023    Annual Wellness Visit (Medicare Advantage)  01/01/2024    Lipids  12/15/2024    Depression Monitoring  05/10/2025    DTaP/Tdap/Td vaccine (2 - Td or Tdap) 11/16/2033    Flu vaccine  Completed    Pneumococcal 65+ years Vaccine  Completed    Hepatitis A vaccine  Aged Out    Hib vaccine  Aged Out    Polio vaccine  Aged Out    Meningococcal (ACWY) vaccine  Aged Out       Hemoglobin A1C (%)   Date Value   11/06/2023 6.4 (H)   06/08/2022 6.4 (H)   10/04/2021 6.0             ( goal A1C is < 7)   No components found for: \"LABMICR\"  No components found for: \"LDLCHOLESTEROL\", \"LDLCALC\"    (goal LDL is <100)   AST (U/L)   Date Value   06/14/2023 23     ALT (U/L)   Date Value   06/14/2023 13     BUN (mg/dL)   Date Value   03/06/2024 15     BP Readings from Last 3 Encounters:   05/10/24 107/64   05/09/24 121/72   04/17/24 125/78          (goal 120/80)    All Future Testing planned in [facility]  Lab Frequency Next Occurrence   Basic Metabolic Panel Once 07/19/2023   CBC with Auto Differential Once 07/19/2023   Magnesium Once 07/19/2023   Phosphorus Once 07/19/2023   Urinalysis with Microscopic Once 07/19/2023   US ABDOMEN LIMITED Once 07/19/2023   NM ARTHROCENTESIS ASPIR&/INJ MAJOR JT/BURSA W/O US Once 01/12/2024   Nuclear stress test

## 2024-05-24 ENCOUNTER — CARE COORDINATION (OUTPATIENT)
Dept: CARE COORDINATION | Age: 84
End: 2024-05-24

## 2024-05-24 NOTE — CARE COORDINATION
HC attempted to contact the patient, there was no answer.  HC left a message for the patient and provided contact information for a return call.    Plan of Care  HC will follow up with patient  next week

## 2024-05-28 ENCOUNTER — CARE COORDINATION (OUTPATIENT)
Dept: CARE COORDINATION | Age: 84
End: 2024-05-28

## 2024-05-28 NOTE — CARE COORDINATION
Physical Therapy)  Social Work: Completed (Comment: Delfin Frausto, ZEB)  Transportation Support: Completed (Comment: Delfin Frausto, ZEB, assist with transportation arrangements for appointments)  Zone Management Tools: Completed          Goals Addressed    None         Future Appointments   Date Time Provider Department Center   6/18/2024  2:00 PM Slime Baker APRN - CNP [facility] Neuro [facility]   6/21/2024 10:00 AM Jamal Barnes MD [facility]   10/29/2024  1:00 PM Abraham Cash MD Neuro [facility] Neurology -

## 2024-05-28 NOTE — CARE COORDINATION
HC attempted to contact the patient, there was no answer.  HC left a message for the patient and provided contact information for the patient to return HC's phone call.    Plan of Care  HC will reach to patient in a few days

## 2024-05-30 ENCOUNTER — CARE COORDINATION (OUTPATIENT)
Dept: CARE COORDINATION | Age: 84
End: 2024-05-30

## 2024-05-30 NOTE — CARE COORDINATION
HC attempted again to speak with the patient.  There was no answer, HC was unable to leave a message for the patient.\\    Plan of Care  HC will attempt to contact the patient again next week.

## 2024-06-04 ENCOUNTER — CARE COORDINATION (OUTPATIENT)
Dept: CARE COORDINATION | Age: 84
End: 2024-06-04

## 2024-06-04 NOTE — CARE COORDINATION
HC  made the third attempt to contact the patient.  Patient's phone isn't working at this time.    Plan of Care  HC will make another attempt next week

## 2024-06-06 ENCOUNTER — APPOINTMENT (OUTPATIENT)
Dept: GENERAL RADIOLOGY | Age: 84
DRG: 392 | End: 2024-06-06
Payer: MEDICARE

## 2024-06-06 ENCOUNTER — HOSPITAL ENCOUNTER (INPATIENT)
Age: 84
LOS: 5 days | Discharge: NURSING, REHAB OR LONG-TERM CARE | DRG: 392 | End: 2024-06-11
Attending: EMERGENCY MEDICINE | Admitting: FAMILY MEDICINE
Payer: MEDICARE

## 2024-06-06 DIAGNOSIS — R79.89 ELEVATED TROPONIN: ICD-10-CM

## 2024-06-06 DIAGNOSIS — I20.0 UNSTABLE ANGINA PECTORIS (HCC): Primary | ICD-10-CM

## 2024-06-06 PROBLEM — R07.89 ATYPICAL CHEST PAIN: Status: ACTIVE | Noted: 2024-06-06

## 2024-06-06 LAB
ANION GAP SERPL CALCULATED.3IONS-SCNC: 9 MMOL/L (ref 9–16)
ANTI-XA UNFRAC HEPARIN: 0.61 IU/L
ANTI-XA UNFRAC HEPARIN: <0.1 IU/L
BASOPHILS # BLD: 0.03 K/UL (ref 0–0.2)
BASOPHILS NFR BLD: 1 % (ref 0–2)
BNP SERPL-MCNC: 214 PG/ML (ref 0–300)
BUN SERPL-MCNC: 19 MG/DL (ref 8–23)
CA-I BLD-SCNC: 1.15 MMOL/L (ref 1.13–1.33)
CALCIUM SERPL-MCNC: 9.2 MG/DL (ref 8.6–10.4)
CO2 SERPL-SCNC: 24 MMOL/L (ref 20–31)
CREAT SERPL-MCNC: 1.5 MG/DL (ref 0.7–1.2)
EOSINOPHIL # BLD: 0.14 K/UL (ref 0–0.44)
EOSINOPHILS RELATIVE PERCENT: 2 % (ref 1–4)
ERYTHROCYTE [DISTWIDTH] IN BLOOD BY AUTOMATED COUNT: 13.5 % (ref 11.8–14.4)
GFR, ESTIMATED: 46 ML/MIN/1.73M2
GLUCOSE SERPL-MCNC: 181 MG/DL (ref 74–99)
HCT VFR BLD AUTO: 43.1 % (ref 40.7–50.3)
HGB BLD-MCNC: 15 G/DL (ref 13–17)
IMM GRANULOCYTES # BLD AUTO: <0.03 K/UL (ref 0–0.3)
IMM GRANULOCYTES NFR BLD: 0 %
INR PPP: 1
LYMPHOCYTES NFR BLD: 2.15 K/UL (ref 1.1–3.7)
LYMPHOCYTES RELATIVE PERCENT: 32 % (ref 24–43)
MAGNESIUM SERPL-MCNC: 2.2 MG/DL (ref 1.6–2.4)
MCH RBC QN AUTO: 33.1 PG (ref 25.2–33.5)
MCHC RBC AUTO-ENTMCNC: 34.8 G/DL (ref 28.4–34.8)
MCV RBC AUTO: 95.1 FL (ref 82.6–102.9)
MONOCYTES NFR BLD: 0.51 K/UL (ref 0.1–1.2)
MONOCYTES NFR BLD: 8 % (ref 3–12)
NEUTROPHILS NFR BLD: 57 % (ref 36–65)
NEUTS SEG NFR BLD: 3.81 K/UL (ref 1.5–8.1)
NRBC BLD-RTO: 0 PER 100 WBC
PARTIAL THROMBOPLASTIN TIME: 29.7 SEC (ref 23–36.5)
PLATELET # BLD AUTO: 170 K/UL (ref 138–453)
PMV BLD AUTO: 8.9 FL (ref 8.1–13.5)
POTASSIUM SERPL-SCNC: 4 MMOL/L (ref 3.7–5.3)
PROTHROMBIN TIME: 12.9 SEC (ref 11.7–14.9)
RBC # BLD AUTO: 4.53 M/UL (ref 4.21–5.77)
SODIUM SERPL-SCNC: 134 MMOL/L (ref 136–145)
T4 FREE SERPL-MCNC: 1.2 NG/DL (ref 0.92–1.68)
TROPONIN I SERPL HS-MCNC: 37 NG/L (ref 0–22)
TROPONIN I SERPL HS-MCNC: 38 NG/L (ref 0–22)
TROPONIN I SERPL HS-MCNC: 40 NG/L (ref 0–22)
WBC OTHER # BLD: 6.7 K/UL (ref 3.5–11.3)

## 2024-06-06 PROCEDURE — 82330 ASSAY OF CALCIUM: CPT

## 2024-06-06 PROCEDURE — 85025 COMPLETE CBC W/AUTO DIFF WBC: CPT

## 2024-06-06 PROCEDURE — 36415 COLL VENOUS BLD VENIPUNCTURE: CPT

## 2024-06-06 PROCEDURE — 85610 PROTHROMBIN TIME: CPT

## 2024-06-06 PROCEDURE — 83880 ASSAY OF NATRIURETIC PEPTIDE: CPT

## 2024-06-06 PROCEDURE — 93005 ELECTROCARDIOGRAM TRACING: CPT

## 2024-06-06 PROCEDURE — 6370000000 HC RX 637 (ALT 250 FOR IP)

## 2024-06-06 PROCEDURE — 84439 ASSAY OF FREE THYROXINE: CPT

## 2024-06-06 PROCEDURE — 2060000000 HC ICU INTERMEDIATE R&B

## 2024-06-06 PROCEDURE — 6360000002 HC RX W HCPCS

## 2024-06-06 PROCEDURE — 71046 X-RAY EXAM CHEST 2 VIEWS: CPT

## 2024-06-06 PROCEDURE — 83735 ASSAY OF MAGNESIUM: CPT

## 2024-06-06 PROCEDURE — 84484 ASSAY OF TROPONIN QUANT: CPT

## 2024-06-06 PROCEDURE — 99222 1ST HOSP IP/OBS MODERATE 55: CPT | Performed by: STUDENT IN AN ORGANIZED HEALTH CARE EDUCATION/TRAINING PROGRAM

## 2024-06-06 PROCEDURE — 99285 EMERGENCY DEPT VISIT HI MDM: CPT

## 2024-06-06 PROCEDURE — 85730 THROMBOPLASTIN TIME PARTIAL: CPT

## 2024-06-06 PROCEDURE — 85520 HEPARIN ASSAY: CPT

## 2024-06-06 PROCEDURE — 99223 1ST HOSP IP/OBS HIGH 75: CPT | Performed by: INTERNAL MEDICINE

## 2024-06-06 PROCEDURE — 80048 BASIC METABOLIC PNL TOTAL CA: CPT

## 2024-06-06 PROCEDURE — 2580000003 HC RX 258

## 2024-06-06 RX ORDER — ISOSORBIDE MONONITRATE 30 MG/1
30 TABLET, EXTENDED RELEASE ORAL DAILY
Status: DISCONTINUED | OUTPATIENT
Start: 2024-06-06 | End: 2024-06-11 | Stop reason: HOSPADM

## 2024-06-06 RX ORDER — ROPINIROLE 0.25 MG/1
0.5 TABLET, FILM COATED ORAL 3 TIMES DAILY
Status: DISCONTINUED | OUTPATIENT
Start: 2024-06-06 | End: 2024-06-11 | Stop reason: HOSPADM

## 2024-06-06 RX ORDER — HEPARIN SODIUM 1000 [USP'U]/ML
2000 INJECTION, SOLUTION INTRAVENOUS; SUBCUTANEOUS PRN
Status: DISCONTINUED | OUTPATIENT
Start: 2024-06-06 | End: 2024-06-07

## 2024-06-06 RX ORDER — POLYETHYLENE GLYCOL 3350 17 G/17G
17 POWDER, FOR SOLUTION ORAL DAILY PRN
Status: DISCONTINUED | OUTPATIENT
Start: 2024-06-06 | End: 2024-06-11 | Stop reason: HOSPADM

## 2024-06-06 RX ORDER — POTASSIUM CHLORIDE 20 MEQ/1
40 TABLET, EXTENDED RELEASE ORAL PRN
Status: DISCONTINUED | OUTPATIENT
Start: 2024-06-06 | End: 2024-06-11 | Stop reason: HOSPADM

## 2024-06-06 RX ORDER — ALLOPURINOL 100 MG/1
100 TABLET ORAL DAILY
Status: DISCONTINUED | OUTPATIENT
Start: 2024-06-06 | End: 2024-06-11 | Stop reason: HOSPADM

## 2024-06-06 RX ORDER — AMITRIPTYLINE HYDROCHLORIDE 10 MG/1
10 TABLET, FILM COATED ORAL NIGHTLY
Status: DISCONTINUED | OUTPATIENT
Start: 2024-06-06 | End: 2024-06-11 | Stop reason: HOSPADM

## 2024-06-06 RX ORDER — ENTACAPONE 200 MG/1
200 TABLET ORAL 4 TIMES DAILY
Status: DISCONTINUED | OUTPATIENT
Start: 2024-06-06 | End: 2024-06-11 | Stop reason: HOSPADM

## 2024-06-06 RX ORDER — SODIUM CHLORIDE 9 MG/ML
INJECTION, SOLUTION INTRAVENOUS PRN
Status: DISCONTINUED | OUTPATIENT
Start: 2024-06-06 | End: 2024-06-11 | Stop reason: HOSPADM

## 2024-06-06 RX ORDER — ACETAMINOPHEN 325 MG/1
650 TABLET ORAL EVERY 6 HOURS PRN
Status: DISCONTINUED | OUTPATIENT
Start: 2024-06-06 | End: 2024-06-11 | Stop reason: HOSPADM

## 2024-06-06 RX ORDER — HEPARIN SODIUM 1000 [USP'U]/ML
4000 INJECTION, SOLUTION INTRAVENOUS; SUBCUTANEOUS PRN
Status: DISCONTINUED | OUTPATIENT
Start: 2024-06-06 | End: 2024-06-07

## 2024-06-06 RX ORDER — ATORVASTATIN CALCIUM 40 MG/1
40 TABLET, FILM COATED ORAL DAILY
Status: DISCONTINUED | OUTPATIENT
Start: 2024-06-06 | End: 2024-06-11 | Stop reason: HOSPADM

## 2024-06-06 RX ORDER — SODIUM CHLORIDE 0.9 % (FLUSH) 0.9 %
5-40 SYRINGE (ML) INJECTION PRN
Status: DISCONTINUED | OUTPATIENT
Start: 2024-06-06 | End: 2024-06-11 | Stop reason: HOSPADM

## 2024-06-06 RX ORDER — ACETAMINOPHEN 500 MG
1000 TABLET ORAL ONCE
Status: COMPLETED | OUTPATIENT
Start: 2024-06-06 | End: 2024-06-06

## 2024-06-06 RX ORDER — AMLODIPINE BESYLATE 5 MG/1
5 TABLET ORAL DAILY
Status: DISCONTINUED | OUTPATIENT
Start: 2024-06-06 | End: 2024-06-11 | Stop reason: HOSPADM

## 2024-06-06 RX ORDER — ENOXAPARIN SODIUM 100 MG/ML
40 INJECTION SUBCUTANEOUS DAILY
Status: DISCONTINUED | OUTPATIENT
Start: 2024-06-06 | End: 2024-06-06

## 2024-06-06 RX ORDER — HEPARIN SODIUM 1000 [USP'U]/ML
4000 INJECTION, SOLUTION INTRAVENOUS; SUBCUTANEOUS ONCE
Status: COMPLETED | OUTPATIENT
Start: 2024-06-06 | End: 2024-06-06

## 2024-06-06 RX ORDER — ACETAMINOPHEN 650 MG/1
650 SUPPOSITORY RECTAL EVERY 6 HOURS PRN
Status: DISCONTINUED | OUTPATIENT
Start: 2024-06-06 | End: 2024-06-11 | Stop reason: HOSPADM

## 2024-06-06 RX ORDER — SUCRALFATE 1 G/1
1 TABLET ORAL
Status: DISCONTINUED | OUTPATIENT
Start: 2024-06-06 | End: 2024-06-11 | Stop reason: HOSPADM

## 2024-06-06 RX ORDER — GABAPENTIN 100 MG/1
100 CAPSULE ORAL DAILY
Status: DISCONTINUED | OUTPATIENT
Start: 2024-06-06 | End: 2024-06-11 | Stop reason: HOSPADM

## 2024-06-06 RX ORDER — PANTOPRAZOLE SODIUM 40 MG/1
40 TABLET, DELAYED RELEASE ORAL DAILY
Status: DISCONTINUED | OUTPATIENT
Start: 2024-06-06 | End: 2024-06-11 | Stop reason: HOSPADM

## 2024-06-06 RX ORDER — CLOPIDOGREL BISULFATE 75 MG/1
75 TABLET ORAL DAILY
Status: DISCONTINUED | OUTPATIENT
Start: 2024-06-06 | End: 2024-06-11 | Stop reason: HOSPADM

## 2024-06-06 RX ORDER — POTASSIUM CHLORIDE 7.45 MG/ML
10 INJECTION INTRAVENOUS PRN
Status: DISCONTINUED | OUTPATIENT
Start: 2024-06-06 | End: 2024-06-11 | Stop reason: HOSPADM

## 2024-06-06 RX ORDER — ONDANSETRON 2 MG/ML
4 INJECTION INTRAMUSCULAR; INTRAVENOUS EVERY 6 HOURS PRN
Status: DISCONTINUED | OUTPATIENT
Start: 2024-06-06 | End: 2024-06-11 | Stop reason: HOSPADM

## 2024-06-06 RX ORDER — SODIUM CHLORIDE 0.9 % (FLUSH) 0.9 %
5-40 SYRINGE (ML) INJECTION EVERY 12 HOURS SCHEDULED
Status: DISCONTINUED | OUTPATIENT
Start: 2024-06-06 | End: 2024-06-11 | Stop reason: HOSPADM

## 2024-06-06 RX ORDER — TAMSULOSIN HYDROCHLORIDE 0.4 MG/1
0.4 CAPSULE ORAL DAILY
Status: DISCONTINUED | OUTPATIENT
Start: 2024-06-06 | End: 2024-06-11 | Stop reason: HOSPADM

## 2024-06-06 RX ORDER — ONDANSETRON 4 MG/1
4 TABLET, ORALLY DISINTEGRATING ORAL EVERY 8 HOURS PRN
Status: DISCONTINUED | OUTPATIENT
Start: 2024-06-06 | End: 2024-06-11 | Stop reason: HOSPADM

## 2024-06-06 RX ORDER — MAGNESIUM SULFATE IN WATER 40 MG/ML
2000 INJECTION, SOLUTION INTRAVENOUS PRN
Status: DISCONTINUED | OUTPATIENT
Start: 2024-06-06 | End: 2024-06-11 | Stop reason: HOSPADM

## 2024-06-06 RX ORDER — HEPARIN SODIUM 10000 [USP'U]/100ML
5-30 INJECTION, SOLUTION INTRAVENOUS CONTINUOUS
Status: DISCONTINUED | OUTPATIENT
Start: 2024-06-06 | End: 2024-06-07

## 2024-06-06 RX ADMIN — ENTACAPONE 200 MG: 200 TABLET, FILM COATED ORAL at 20:54

## 2024-06-06 RX ADMIN — AMLODIPINE BESYLATE 5 MG: 5 TABLET ORAL at 16:20

## 2024-06-06 RX ADMIN — AMITRIPTYLINE HYDROCHLORIDE 10 MG: 10 TABLET, FILM COATED ORAL at 20:53

## 2024-06-06 RX ADMIN — ALLOPURINOL 100 MG: 100 TABLET ORAL at 17:35

## 2024-06-06 RX ADMIN — ROPINIROLE HYDROCHLORIDE 0.5 MG: 0.25 TABLET, FILM COATED ORAL at 20:53

## 2024-06-06 RX ADMIN — CLOPIDOGREL BISULFATE 75 MG: 75 TABLET ORAL at 16:20

## 2024-06-06 RX ADMIN — ATORVASTATIN CALCIUM 40 MG: 40 TABLET, FILM COATED ORAL at 16:19

## 2024-06-06 RX ADMIN — ENTACAPONE 200 MG: 200 TABLET, FILM COATED ORAL at 17:35

## 2024-06-06 RX ADMIN — CARBIDOPA AND LEVODOPA 1 TABLET: 25; 100 TABLET ORAL at 17:35

## 2024-06-06 RX ADMIN — HEPARIN SODIUM 4000 UNITS: 1000 INJECTION INTRAVENOUS; SUBCUTANEOUS at 17:41

## 2024-06-06 RX ADMIN — ROPINIROLE HYDROCHLORIDE 0.5 MG: 0.25 TABLET, FILM COATED ORAL at 17:34

## 2024-06-06 RX ADMIN — GABAPENTIN 100 MG: 100 CAPSULE ORAL at 16:20

## 2024-06-06 RX ADMIN — ACETAMINOPHEN 1000 MG: 500 TABLET ORAL at 12:04

## 2024-06-06 RX ADMIN — SUCRALFATE 1 G: 1 TABLET ORAL at 16:20

## 2024-06-06 RX ADMIN — ISOSORBIDE MONONITRATE 30 MG: 30 TABLET, EXTENDED RELEASE ORAL at 16:19

## 2024-06-06 RX ADMIN — TAMSULOSIN HYDROCHLORIDE 0.4 MG: 0.4 CAPSULE ORAL at 16:20

## 2024-06-06 RX ADMIN — CARBIDOPA AND LEVODOPA 1 TABLET: 25; 100 TABLET ORAL at 20:53

## 2024-06-06 RX ADMIN — HEPARIN SODIUM 11 UNITS/KG/HR: 10000 INJECTION, SOLUTION INTRAVENOUS at 17:43

## 2024-06-06 RX ADMIN — PANTOPRAZOLE SODIUM 40 MG: 40 TABLET, DELAYED RELEASE ORAL at 16:20

## 2024-06-06 RX ADMIN — SUCRALFATE 1 G: 1 TABLET ORAL at 20:54

## 2024-06-06 RX ADMIN — SODIUM CHLORIDE, PRESERVATIVE FREE 10 ML: 5 INJECTION INTRAVENOUS at 20:53

## 2024-06-06 ASSESSMENT — PAIN SCALES - GENERAL: PAINLEVEL_OUTOF10: 5

## 2024-06-06 NOTE — ED PROVIDER NOTES
[facility]A STEPDOWN  Emergency Department Encounter  Emergency Medicine Resident     Pt Name:Mina Gill  MRN: [id number]  Birthdate 1940  Date of evaluation: 6/6/24  PCP:  Jamal Barnes MD  Note Started: 11:46 AM EDT      CHIEF COMPLAINT       Chief Complaint   Patient presents with    Chest Pain     Stress test and cath last month        HISTORY OF PRESENT ILLNESS  (Location/Symptom, Timing/Onset, Context/Setting, Quality, Duration, Modifying Factors, Severity.)      Mina Gill is a 83-year-old male who presents to the ED with complaints of worsening chronic chest pain over the last 3 weeks.  Patient states the pain comes intermittently whether he is laying down or moving.  He does feel minor shortness of breath when this chest discomfort happens.  Denies any radiation or numbness.  Patient does have a history of Parkinson and is currently being treated with multiple antiparkinson medications.  He also extensive cardiac history including CAD with stent.  He had a cardiac angiogram in April 2024, which showed a patent proximal LAD stent as well as minimal CAD.  Additionally, he had an echo in March 2024 that showed an EF of 50 to 55%.  Patient denies any headache, fever, chills, nausea, vomiting, Pablo pain, decreased p.o. tolerance of food or water, dysuria, change in bowel habits, swelling in his bilateral lower extremity    Patient is compliant with all his medications    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 ([facility]), CKD (chronic kidney disease) stage 2, GFR 60-89 ml/min, CKD (chronic kidney disease) stage 3, GFR 30-59 ml/min ([facility]), Diverticulosis of colon, GERD (gastroesophageal reflux disease), History of non-ST elevation myocardial infarction (NSTEMI) 6/2022, Impaired renal function, MRSA (methicillin resistant staph aureus) culture positive, Osteoarthritis of knee, Parkinson disease ([facility]),

## 2024-06-06 NOTE — ED PROVIDER NOTES
Note Started: 1:46 PM EDT         [facility]     Emergency Department     Faculty Attestation    I performed a history and physical examination of the patient and discussed management with the resident. I reviewed the resident’s note and agree with the documented findings and plan of care. Any areas of disagreement are noted on the chart. I was personally present for the key portions of any procedures. I have documented in the chart those procedures where I was not present during the key portions. I have reviewed the emergency nurses triage note. I agree with the chief complaint, past medical history, past surgical history, allergies, medications, social and family history as documented unless otherwise noted below.        For Physician Assistant/ Nurse Practitioner cases/documentation I have personally evaluated this patient and have completed at least one if not all key elements of the E/M (history, physical exam, and MDM). Additional findings are as noted.  I have personally seen and evaluated the patient.  I find the patient's history and physical exam are consistent with the NP/PA documentation.  I agree with the care provided, treatment rendered, disposition and follow-up plan.    83-year-old male with history of Parkinson's disease presenting with chest pain and dyspnea.  Has had similar symptoms in the past, recently had a negative cath in April.  Continues to have intermittent chest pain.  Mostly at nighttime between 2 and 3 AM.  Not positional.    Exam:  General : Laying on the bed, awake, alert, in no acute distress, and pill-rolling tremor  CV : normal rate and regular rhythm  Lungs : Breathing comfortably on room air with no tachypnea, hypoxia, or increased work of breathing    DDx: Atypical ACS, musculoskeletal chest pain, dopaminergic physiologic changes/autonomic dysfunction    Plan:  Cardiac workup  Patient may benefit from modulation of his Parkinson's medications if this is

## 2024-06-06 NOTE — ED NOTES
ED to inpatient nurses report      Chief Complaint:  Chief Complaint   Patient presents with    Chest Pain     Stress test and cath last month      Present to ED from: home c/o intermittent chest pain and sob x2 weeks     MOA:     LOC: alert and orientated to name, place, date  Mobility: Independent  Oxygen Baseline: room air     Current needs required: room air   Pending ED orders: none  Present condition: stable    Why did the patient come to the ED? Chest pain/sob  What is the plan? Home med adjustment, cards cx  Any procedures or intervention occur? Labs/chest xray   Any safety concerns?? No     Mental Status:  Level of Consciousness: Alert (0)    Psych Assessment:   Psychosocial  Psychosocial (WDL): Within Defined Limits  Vital signs   Vitals:    06/06/24 1142 06/06/24 1230 06/06/24 1245 06/06/24 1253   BP: 135/75 114/79 110/66    Pulse: 70 60 77    Resp: 18 18 19    Temp: 98.1 °F (36.7 °C)      TempSrc: Oral      SpO2: 96% 100% 100%    Weight:    83.9 kg (185 lb)        Vitals:  Patient Vitals for the past 24 hrs:   BP Temp Temp src Pulse Resp SpO2 Weight   06/06/24 1253 -- -- -- -- -- -- 83.9 kg (185 lb)   06/06/24 1245 110/66 -- -- 77 19 100 % --   06/06/24 1230 114/79 -- -- 60 18 100 % --   06/06/24 1142 135/75 98.1 °F (36.7 °C) Oral 70 18 96 % --      Visit Vitals  /66   Pulse 77   Temp 98.1 °F (36.7 °C) (Oral)   Resp 19   Wt 83.9 kg (185 lb)   SpO2 100%   BMI 30.79 kg/m²        LDAs:   Peripheral IV 06/06/24 Distal;Right Forearm (Active)   Site Assessment Clean, dry & intact 06/06/24 1156   Line Status Brisk blood return;Normal saline locked 06/06/24 1156   Dressing Status New dressing applied 06/06/24 1156   Dressing Type Transparent 06/06/24 1156   Dressing Intervention New 06/06/24 1156       Ambulatory Status:  No data recorded    Diagnosis:  DISPOSITION Admitted 06/06/2024 02:09:22 PM   Final diagnoses:   None        Consults:  IP CONSULT TO FAMILY MEDICINE  IP CONSULT TO CARDIOLOGY  IP CONSULT

## 2024-06-06 NOTE — H&P
Lymphocytes % 32 24 - 43 %    Monocytes % 8 3 - 12 %    Eosinophils % 2 1 - 4 %    Basophils % 1 0 - 2 %    Immature Granulocytes % 0 0 %    Neutrophils Absolute 3.81 1.50 - 8.10 k/uL    Lymphocytes Absolute 2.15 1.10 - 3.70 k/uL    Monocytes Absolute 0.51 0.10 - 1.20 k/uL    Eosinophils Absolute 0.14 0.00 - 0.44 k/uL    Basophils Absolute 0.03 0.00 - 0.20 k/uL    Immature Granulocytes Absolute <0.03 0.00 - 0.30 k/uL   Magnesium    Collection Time: 06/06/24 11:58 AM   Result Value Ref Range    Magnesium 2.2 1.6 - 2.4 mg/dL   Troponin    Collection Time: 06/06/24 11:58 AM   Result Value Ref Range    Troponin, High Sensitivity 37 (H) 0 - 22 ng/L   T4, Free    Collection Time: 06/06/24 11:58 AM   Result Value Ref Range    T4 Free 1.2 0.92 - 1.68 ng/dL   Troponin    Collection Time: 06/06/24  1:00 PM   Result Value Ref Range    Troponin, High Sensitivity 38 (H) 0 - 22 ng/L   Calcium, Ionized    Collection Time: 06/06/24  1:51 PM   Result Value Ref Range    Calcium, Ionized 1.15 1.13 - 1.33 mmol/L   Troponin    Collection Time: 06/06/24  2:27 PM   Result Value Ref Range    Troponin, High Sensitivity 40 (H) 0 - 22 ng/L         Imaging/Diagonstics:  XR CHEST (2 VW)    Result Date: 6/6/2024  EXAMINATION: TWO XRAY VIEWS OF THE CHEST 6/6/2024 12:53 pm COMPARISON: 03/06/2024. HISTORY: ORDERING SYSTEM PROVIDED HISTORY: Chronic CP TECHNOLOGIST PROVIDED HISTORY: Chronic CP FINDINGS: Shallow inflation.  The cardiomediastinal silhouette appears unchanged. Streaky opacities in the lung bases.  There is no consolidation, pneumothorax or evidence for edema. No evidence for effusion.  No acute osseous abnormality is identified.     Shallow inflation with streaky opacities in the lung bases, which may represent subsegmental atelectasis versus developing consolidation.         ASSESSMENT:       Principal Problem:    Atypical chest pain  Resolved Problems:    * No resolved hospital problems. *      PLAN:     Patient status: Admit the

## 2024-06-06 NOTE — CARE COORDINATION
Case Management Assessment  Initial Evaluation    Date/Time of Evaluation: 6/6/2024 3:29 PM  Assessment Completed by: Antonio Romano RN    If patient is discharged prior to next notation, then this note serves as note for discharge by case management.    Patient Name: Mina Gill                   YOB: 1940  Diagnosis: Atypical chest pain [R07.89]                   Date / Time: 6/6/2024 11:44 AM    Patient Admission Status: Inpatient   Readmission Risk (Low < 19, Mod (19-27), High > 27): No data recorded  Current PCP: Jamal Barnes MD  PCP verified by CM? Yes    Chart Reviewed: Yes      History Provided by: Patient  Patient Orientation: Alert and Oriented    Patient Cognition: Alert    Hospitalization in the last 30 days (Readmission):  No    If yes, Readmission Assessment in  Navigator will be completed.    Advance Directives:      Code Status: Full Code   Patient's Primary Decision Maker is: Legal Next of Kin    Primary Decision Maker: Jhony Gill - Child - [phone]    Primary Decision Maker: Praneeth Gill - Child - [phone]    Primary Decision Maker: Ivone Gill - Child - [phone]    Discharge Planning:    Patient lives with: Friends Type of Home: House  Primary Care Giver: Friend  Patient Support Systems include: Friends/Neighbors   Current Financial resources: Medicare  Current community resources:    Current services prior to admission: Durable Medical Equipment            Current DME: Walker, Cane (uses cane primarily)            Type of Home Care services:       ADLS  Prior functional level: Assistance with the following:, Cooking, Housework, Shopping  Current functional level: Assistance with the following:, Shopping, Housework, Cooking    PT AM-PAC:   /24  OT AM-PAC:   /24    Family can provide assistance at DC: Other (comment) (friend assists with IADLs)  Would you like Case Management to discuss the discharge plan with any other family members/significant others, and if

## 2024-06-06 NOTE — CONSULTS
[facility]    Consult / H&P               Today's Date: 6/6/2024  Patient Name: Mina Gill  Date of admission: 6/6/2024 11:44 AM  Patient's age: 83 y.o., 1940  Admission Dx: Atypical chest pain [R07.89]    Reason for Consult:  Cardiac evaluation    Requesting Physician: El Gold MD    CHIEF COMPLAINT:  Chest pain     History Obtained From:  patient, electronic medical record    HISTORY OF PRESENT ILLNESS:      The patient is a 83 y.o.  male who is admitted to the hospital for chest pain.     The patient has past medical history of CAD s/p PCI to LAD in 2018, with recent cath with patent stents, hypertension, hyperlipidemia, type 2 diabetes mellitus, CKD, Parkinson's disease.  He reports that he was sleep at night, woke up with sudden onset left-sided chest pain, which was sharp in nature, had numbness of both upper extremities associated with some shortness of breath, dizziness and lightheadedness.  He did receive nitro, however reported minimal improvement in the same and continues to have the pain.  He came to ER for evaluation, was noted to be hemodynamically stable.  Labs revealed troponin 37-38-40, at baseline, creatinine 1.5 at baseline.  EKG revealed sinus bradycardia with first-degree AV block, incomplete RBBB, no acute ST-T changes.  Cardiology was consulted for further recommendations.    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

## 2024-06-07 LAB
ALBUMIN SERPL-MCNC: 3.7 G/DL (ref 3.5–5.2)
ALBUMIN/GLOB SERPL: 2 {RATIO} (ref 1–2.5)
ALP SERPL-CCNC: 86 U/L (ref 40–129)
ALT SERPL-CCNC: 9 U/L (ref 10–50)
ANION GAP SERPL CALCULATED.3IONS-SCNC: 9 MMOL/L (ref 9–16)
ANTI-XA UNFRAC HEPARIN: 0.52 IU/L
AST SERPL-CCNC: 32 U/L (ref 10–50)
BASOPHILS # BLD: 0.03 K/UL (ref 0–0.2)
BASOPHILS NFR BLD: 1 % (ref 0–2)
BILIRUB SERPL-MCNC: 0.5 MG/DL (ref 0–1.2)
BUN SERPL-MCNC: 19 MG/DL (ref 8–23)
CALCIUM SERPL-MCNC: 8.8 MG/DL (ref 8.6–10.4)
CHLORIDE SERPL-SCNC: 103 MMOL/L (ref 98–107)
CO2 SERPL-SCNC: 22 MMOL/L (ref 20–31)
CREAT SERPL-MCNC: 1.4 MG/DL (ref 0.7–1.2)
EKG ATRIAL RATE: 59 BPM
EKG ATRIAL RATE: 73 BPM
EKG P AXIS: 84 DEGREES
EKG P AXIS: 94 DEGREES
EKG P-R INTERVAL: 194 MS
EKG P-R INTERVAL: 212 MS
EKG Q-T INTERVAL: 416 MS
EKG Q-T INTERVAL: 452 MS
EKG QRS DURATION: 106 MS
EKG QRS DURATION: 110 MS
EKG QTC CALCULATION (BAZETT): 447 MS
EKG QTC CALCULATION (BAZETT): 458 MS
EKG R AXIS: -70 DEGREES
EKG R AXIS: -93 DEGREES
EKG T AXIS: 69 DEGREES
EKG T AXIS: 90 DEGREES
EKG VENTRICULAR RATE: 59 BPM
EKG VENTRICULAR RATE: 73 BPM
EOSINOPHIL # BLD: 0.2 K/UL (ref 0–0.44)
EOSINOPHILS RELATIVE PERCENT: 4 % (ref 1–4)
ERYTHROCYTE [DISTWIDTH] IN BLOOD BY AUTOMATED COUNT: 13.4 % (ref 11.8–14.4)
GFR, ESTIMATED: 51 ML/MIN/1.73M2
GLUCOSE SERPL-MCNC: 114 MG/DL (ref 74–99)
HCT VFR BLD AUTO: 39 % (ref 40.7–50.3)
HGB BLD-MCNC: 13.2 G/DL (ref 13–17)
IMM GRANULOCYTES # BLD AUTO: <0.03 K/UL (ref 0–0.3)
IMM GRANULOCYTES NFR BLD: 0 %
LYMPHOCYTES NFR BLD: 1.8 K/UL (ref 1.1–3.7)
LYMPHOCYTES RELATIVE PERCENT: 33 % (ref 24–43)
MCH RBC QN AUTO: 32.5 PG (ref 25.2–33.5)
MCHC RBC AUTO-ENTMCNC: 33.8 G/DL (ref 28.4–34.8)
MCV RBC AUTO: 96.1 FL (ref 82.6–102.9)
MONOCYTES NFR BLD: 0.54 K/UL (ref 0.1–1.2)
MONOCYTES NFR BLD: 10 % (ref 3–12)
NEUTROPHILS NFR BLD: 52 % (ref 36–65)
NEUTS SEG NFR BLD: 2.83 K/UL (ref 1.5–8.1)
NRBC BLD-RTO: 0 PER 100 WBC
PLATELET # BLD AUTO: 165 K/UL (ref 138–453)
PMV BLD AUTO: 9.3 FL (ref 8.1–13.5)
POTASSIUM SERPL-SCNC: 4.1 MMOL/L (ref 3.7–5.3)
PROT SERPL-MCNC: 6.1 G/DL (ref 6.6–8.7)
RBC # BLD AUTO: 4.06 M/UL (ref 4.21–5.77)
SODIUM SERPL-SCNC: 134 MMOL/L (ref 136–145)
WBC OTHER # BLD: 5.4 K/UL (ref 3.5–11.3)

## 2024-06-07 PROCEDURE — 99233 SBSQ HOSP IP/OBS HIGH 50: CPT | Performed by: NURSE PRACTITIONER

## 2024-06-07 PROCEDURE — 36415 COLL VENOUS BLD VENIPUNCTURE: CPT

## 2024-06-07 PROCEDURE — 85520 HEPARIN ASSAY: CPT

## 2024-06-07 PROCEDURE — 6370000000 HC RX 637 (ALT 250 FOR IP)

## 2024-06-07 PROCEDURE — 97162 PT EVAL MOD COMPLEX 30 MIN: CPT

## 2024-06-07 PROCEDURE — 85025 COMPLETE CBC W/AUTO DIFF WBC: CPT

## 2024-06-07 PROCEDURE — 2580000003 HC RX 258

## 2024-06-07 PROCEDURE — 80053 COMPREHEN METABOLIC PANEL: CPT

## 2024-06-07 PROCEDURE — 97530 THERAPEUTIC ACTIVITIES: CPT

## 2024-06-07 PROCEDURE — 6360000002 HC RX W HCPCS

## 2024-06-07 PROCEDURE — 97110 THERAPEUTIC EXERCISES: CPT

## 2024-06-07 PROCEDURE — 2060000000 HC ICU INTERMEDIATE R&B

## 2024-06-07 PROCEDURE — 97116 GAIT TRAINING THERAPY: CPT

## 2024-06-07 PROCEDURE — 99232 SBSQ HOSP IP/OBS MODERATE 35: CPT | Performed by: STUDENT IN AN ORGANIZED HEALTH CARE EDUCATION/TRAINING PROGRAM

## 2024-06-07 RX ORDER — ENOXAPARIN SODIUM 100 MG/ML
40 INJECTION SUBCUTANEOUS DAILY
Status: DISCONTINUED | OUTPATIENT
Start: 2024-06-07 | End: 2024-06-11 | Stop reason: HOSPADM

## 2024-06-07 RX ORDER — ONDANSETRON 4 MG/1
4 TABLET, ORALLY DISINTEGRATING ORAL EVERY 8 HOURS PRN
Qty: 30 TABLET | Refills: 0 | Status: SHIPPED | OUTPATIENT
Start: 2024-06-07

## 2024-06-07 RX ADMIN — CLOPIDOGREL BISULFATE 75 MG: 75 TABLET ORAL at 09:22

## 2024-06-07 RX ADMIN — ATORVASTATIN CALCIUM 40 MG: 40 TABLET, FILM COATED ORAL at 09:34

## 2024-06-07 RX ADMIN — ENOXAPARIN SODIUM 40 MG: 100 INJECTION SUBCUTANEOUS at 09:26

## 2024-06-07 RX ADMIN — AMITRIPTYLINE HYDROCHLORIDE 10 MG: 10 TABLET, FILM COATED ORAL at 20:17

## 2024-06-07 RX ADMIN — ENTACAPONE 200 MG: 200 TABLET, FILM COATED ORAL at 12:49

## 2024-06-07 RX ADMIN — ISOSORBIDE MONONITRATE 30 MG: 30 TABLET, EXTENDED RELEASE ORAL at 09:26

## 2024-06-07 RX ADMIN — SODIUM CHLORIDE, PRESERVATIVE FREE 10 ML: 5 INJECTION INTRAVENOUS at 20:17

## 2024-06-07 RX ADMIN — ACETAMINOPHEN 650 MG: 325 TABLET ORAL at 01:00

## 2024-06-07 RX ADMIN — SUCRALFATE 1 G: 1 TABLET ORAL at 12:51

## 2024-06-07 RX ADMIN — ROPINIROLE HYDROCHLORIDE 0.5 MG: 0.25 TABLET, FILM COATED ORAL at 20:17

## 2024-06-07 RX ADMIN — CARBIDOPA AND LEVODOPA 1 TABLET: 25; 100 TABLET ORAL at 18:16

## 2024-06-07 RX ADMIN — AMLODIPINE BESYLATE 5 MG: 5 TABLET ORAL at 09:34

## 2024-06-07 RX ADMIN — CARBIDOPA AND LEVODOPA 1 TABLET: 25; 100 TABLET ORAL at 12:47

## 2024-06-07 RX ADMIN — ENTACAPONE 200 MG: 200 TABLET, FILM COATED ORAL at 09:26

## 2024-06-07 RX ADMIN — TAMSULOSIN HYDROCHLORIDE 0.4 MG: 0.4 CAPSULE ORAL at 09:26

## 2024-06-07 RX ADMIN — ALLOPURINOL 100 MG: 100 TABLET ORAL at 09:26

## 2024-06-07 RX ADMIN — SUCRALFATE 1 G: 1 TABLET ORAL at 20:16

## 2024-06-07 RX ADMIN — CARBIDOPA AND LEVODOPA 1 TABLET: 25; 100 TABLET ORAL at 20:17

## 2024-06-07 RX ADMIN — ENTACAPONE 200 MG: 200 TABLET, FILM COATED ORAL at 20:16

## 2024-06-07 RX ADMIN — PANTOPRAZOLE SODIUM 40 MG: 40 TABLET, DELAYED RELEASE ORAL at 09:26

## 2024-06-07 RX ADMIN — SUCRALFATE 1 G: 1 TABLET ORAL at 09:26

## 2024-06-07 RX ADMIN — CARBIDOPA AND LEVODOPA 1 TABLET: 25; 100 TABLET ORAL at 09:22

## 2024-06-07 RX ADMIN — ROPINIROLE HYDROCHLORIDE 0.5 MG: 0.25 TABLET, FILM COATED ORAL at 14:10

## 2024-06-07 RX ADMIN — ENTACAPONE 200 MG: 200 TABLET, FILM COATED ORAL at 18:17

## 2024-06-07 RX ADMIN — SUCRALFATE 1 G: 1 TABLET ORAL at 18:15

## 2024-06-07 RX ADMIN — SODIUM CHLORIDE, PRESERVATIVE FREE 10 ML: 5 INJECTION INTRAVENOUS at 09:27

## 2024-06-07 RX ADMIN — ROPINIROLE HYDROCHLORIDE 0.5 MG: 0.25 TABLET, FILM COATED ORAL at 09:25

## 2024-06-07 RX ADMIN — GABAPENTIN 100 MG: 100 CAPSULE ORAL at 09:34

## 2024-06-07 ASSESSMENT — PAIN DESCRIPTION - DESCRIPTORS: DESCRIPTORS: ACHING

## 2024-06-07 ASSESSMENT — PAIN SCALES - GENERAL: PAINLEVEL_OUTOF10: 5

## 2024-06-07 ASSESSMENT — PAIN DESCRIPTION - ORIENTATION: ORIENTATION: LEFT

## 2024-06-07 ASSESSMENT — PAIN DESCRIPTION - LOCATION: LOCATION: RIB CAGE

## 2024-06-07 NOTE — DISCHARGE INSTR - COC
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    Diaphragmatic hernia without obstruction or gangrene K44.9    Diverticulosis of intestine, part unspecified, without perforation or abscess without bleeding K57.90    Hypertensive heart and chronic kidney disease with heart failure and stage 1 through stage 4 chronic kidney disease, or unspecified chronic kidney disease (HCC) I13.0    Renal insufficiency syndrome N28.9    Other retention of urine R33.8    Other specific arthropathies, not elsewhere classified, other specified site M12.88    Other specified diseases of pancreas K86.89    Presence of coronary angioplasty implant and graft Z95.5    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    Type 2 diabetes mellitus with hyperglycemia (HCC) E11.65    Primary osteoarthritis of left knee M17.12    Unspecified diastolic (congestive) heart failure (HCC) I50.30    Primary osteoarthritis of both knees M17.0    Abnormal stress test R94.39    Progressive angina (HCC) I20.0    Atypical chest pain R07.89       Isolation/Infection:   Isolation            Contact          Patient Infection Status       Infection Onset Added Last Indicated Last Indicated By Review Planned Expiration Resolved Resolved By    MRSA  09/25/15 09/25/15 Aditi Jacques RN        Leg 9/23/15                       Nurse Assessment:  Last Vital Signs: /69   Pulse 55   Temp 97.9 °F (36.6 °C) (Oral)   Resp 20   Ht 1.702 m (5' 7\")   Wt 84.8 kg (186 lb 15.2 oz)   SpO2 99%   BMI 29.28 kg/m²     Last documented pain score (0-10 scale): Pain

## 2024-06-07 NOTE — CARE COORDINATION
Transitional planning-talked with patient about possible SNF-wanting [facility].    1205PM faxed referral to [facility] and called to [place].

## 2024-06-08 PROBLEM — I24.9 ACS (ACUTE CORONARY SYNDROME) (HCC): Status: ACTIVE | Noted: 2024-06-08

## 2024-06-08 LAB
ANION GAP SERPL CALCULATED.3IONS-SCNC: 10 MMOL/L (ref 9–16)
BUN SERPL-MCNC: 18 MG/DL (ref 8–23)
CALCIUM SERPL-MCNC: 8.9 MG/DL (ref 8.6–10.4)
CHLORIDE SERPL-SCNC: 103 MMOL/L (ref 98–107)
CO2 SERPL-SCNC: 22 MMOL/L (ref 20–31)
CREAT SERPL-MCNC: 1.4 MG/DL (ref 0.7–1.2)
ERYTHROCYTE [DISTWIDTH] IN BLOOD BY AUTOMATED COUNT: 13.4 % (ref 11.8–14.4)
GFR, ESTIMATED: 52 ML/MIN/1.73M2
GLUCOSE SERPL-MCNC: 146 MG/DL (ref 74–99)
HCT VFR BLD AUTO: 44.2 % (ref 40.7–50.3)
HGB BLD-MCNC: 14.6 G/DL (ref 13–17)
MCH RBC QN AUTO: 32.7 PG (ref 25.2–33.5)
MCHC RBC AUTO-ENTMCNC: 33 G/DL (ref 28.4–34.8)
MCV RBC AUTO: 99.1 FL (ref 82.6–102.9)
NRBC BLD-RTO: 0 PER 100 WBC
PLATELET # BLD AUTO: 157 K/UL (ref 138–453)
PMV BLD AUTO: 9.2 FL (ref 8.1–13.5)
POTASSIUM SERPL-SCNC: 4.6 MMOL/L (ref 3.7–5.3)
RBC # BLD AUTO: 4.46 M/UL (ref 4.21–5.77)
SODIUM SERPL-SCNC: 135 MMOL/L (ref 136–145)
WBC OTHER # BLD: 6.4 K/UL (ref 3.5–11.3)

## 2024-06-08 PROCEDURE — 2060000000 HC ICU INTERMEDIATE R&B

## 2024-06-08 PROCEDURE — 36415 COLL VENOUS BLD VENIPUNCTURE: CPT

## 2024-06-08 PROCEDURE — 99232 SBSQ HOSP IP/OBS MODERATE 35: CPT | Performed by: STUDENT IN AN ORGANIZED HEALTH CARE EDUCATION/TRAINING PROGRAM

## 2024-06-08 PROCEDURE — 85027 COMPLETE CBC AUTOMATED: CPT

## 2024-06-08 PROCEDURE — 2580000003 HC RX 258

## 2024-06-08 PROCEDURE — 97535 SELF CARE MNGMENT TRAINING: CPT

## 2024-06-08 PROCEDURE — 97530 THERAPEUTIC ACTIVITIES: CPT

## 2024-06-08 PROCEDURE — 6370000000 HC RX 637 (ALT 250 FOR IP)

## 2024-06-08 PROCEDURE — 97166 OT EVAL MOD COMPLEX 45 MIN: CPT

## 2024-06-08 PROCEDURE — 6360000002 HC RX W HCPCS

## 2024-06-08 PROCEDURE — 80048 BASIC METABOLIC PNL TOTAL CA: CPT

## 2024-06-08 RX ADMIN — ENTACAPONE 200 MG: 200 TABLET, FILM COATED ORAL at 12:51

## 2024-06-08 RX ADMIN — ENTACAPONE 200 MG: 200 TABLET, FILM COATED ORAL at 08:27

## 2024-06-08 RX ADMIN — SUCRALFATE 1 G: 1 TABLET ORAL at 21:19

## 2024-06-08 RX ADMIN — CARBIDOPA AND LEVODOPA 1 TABLET: 25; 100 TABLET ORAL at 12:51

## 2024-06-08 RX ADMIN — ROPINIROLE HYDROCHLORIDE 0.5 MG: 0.25 TABLET, FILM COATED ORAL at 08:26

## 2024-06-08 RX ADMIN — ISOSORBIDE MONONITRATE 30 MG: 30 TABLET, EXTENDED RELEASE ORAL at 08:25

## 2024-06-08 RX ADMIN — SUCRALFATE 1 G: 1 TABLET ORAL at 12:01

## 2024-06-08 RX ADMIN — ENTACAPONE 200 MG: 200 TABLET, FILM COATED ORAL at 21:17

## 2024-06-08 RX ADMIN — ACETAMINOPHEN 650 MG: 325 TABLET ORAL at 08:24

## 2024-06-08 RX ADMIN — SUCRALFATE 1 G: 1 TABLET ORAL at 16:54

## 2024-06-08 RX ADMIN — ACETAMINOPHEN 650 MG: 325 TABLET ORAL at 06:31

## 2024-06-08 RX ADMIN — CARBIDOPA AND LEVODOPA 1 TABLET: 25; 100 TABLET ORAL at 16:55

## 2024-06-08 RX ADMIN — TAMSULOSIN HYDROCHLORIDE 0.4 MG: 0.4 CAPSULE ORAL at 08:25

## 2024-06-08 RX ADMIN — SODIUM CHLORIDE, PRESERVATIVE FREE 10 ML: 5 INJECTION INTRAVENOUS at 08:28

## 2024-06-08 RX ADMIN — PANTOPRAZOLE SODIUM 40 MG: 40 TABLET, DELAYED RELEASE ORAL at 08:25

## 2024-06-08 RX ADMIN — AMITRIPTYLINE HYDROCHLORIDE 10 MG: 10 TABLET, FILM COATED ORAL at 21:16

## 2024-06-08 RX ADMIN — ENTACAPONE 200 MG: 200 TABLET, FILM COATED ORAL at 16:54

## 2024-06-08 RX ADMIN — ALLOPURINOL 100 MG: 100 TABLET ORAL at 08:25

## 2024-06-08 RX ADMIN — SODIUM CHLORIDE, PRESERVATIVE FREE 10 ML: 5 INJECTION INTRAVENOUS at 21:19

## 2024-06-08 RX ADMIN — CLOPIDOGREL BISULFATE 75 MG: 75 TABLET ORAL at 08:25

## 2024-06-08 RX ADMIN — CARBIDOPA AND LEVODOPA 1 TABLET: 25; 100 TABLET ORAL at 21:16

## 2024-06-08 RX ADMIN — AMLODIPINE BESYLATE 5 MG: 5 TABLET ORAL at 08:25

## 2024-06-08 RX ADMIN — ENOXAPARIN SODIUM 40 MG: 100 INJECTION SUBCUTANEOUS at 08:28

## 2024-06-08 RX ADMIN — ROPINIROLE HYDROCHLORIDE 0.5 MG: 0.25 TABLET, FILM COATED ORAL at 14:11

## 2024-06-08 RX ADMIN — GABAPENTIN 100 MG: 100 CAPSULE ORAL at 08:25

## 2024-06-08 RX ADMIN — CARBIDOPA AND LEVODOPA 1 TABLET: 25; 100 TABLET ORAL at 08:26

## 2024-06-08 RX ADMIN — SUCRALFATE 1 G: 1 TABLET ORAL at 08:25

## 2024-06-08 RX ADMIN — ROPINIROLE HYDROCHLORIDE 0.5 MG: 0.25 TABLET, FILM COATED ORAL at 21:16

## 2024-06-08 RX ADMIN — ATORVASTATIN CALCIUM 40 MG: 40 TABLET, FILM COATED ORAL at 08:25

## 2024-06-08 ASSESSMENT — PAIN SCALES - GENERAL
PAINLEVEL_OUTOF10: 0
PAINLEVEL_OUTOF10: 5
PAINLEVEL_OUTOF10: 5

## 2024-06-08 ASSESSMENT — PAIN SCALES - WONG BAKER: WONGBAKER_NUMERICALRESPONSE: HURTS A LITTLE BIT

## 2024-06-08 ASSESSMENT — PAIN DESCRIPTION - DESCRIPTORS: DESCRIPTORS: ACHING

## 2024-06-08 ASSESSMENT — PAIN DESCRIPTION - LOCATION: LOCATION: RIB CAGE

## 2024-06-08 NOTE — PROGRESS NOTES
Plan  Times per Week: 3-4x/wk  Current Treatment Recommendations: Strengthening, Balance training, Functional mobility training, Endurance training, Pain management, Safety education & training, Patient/Caregiver education & training, Equipment evaluation, education, & procurement, Self-Care / ADL, Coordination training, Sensory integraion, Cognitive/Perceptual training     Restrictions  Restrictions/Precautions  Restrictions/Precautions: Fall Risk, General Precautions, Contact Precautions  Required Braces or Orthoses?: No  Position Activity Restriction  Other position/activity restrictions: Up with assistance, Parkinson's    Subjective   General  Patient assessed for rehabilitation services?: Yes  Additional Pertinent Hx: Martha Hutchins is a 79 y.o. right handed  male with PMH significant for head trauma in March 2011 after he fell off from the ladder with a skull fracture along with subdural hematoma that was treated conservatively. As per patient and previous notes he started having headache that got worse for last couple of months. He also endorses headaches are located the top of the head but is unable to explain the duration 4 times in a week and said that once it starts happening it keeps going. He tried taking Tylenol that has partially relieved and previously he has been taking Fioricet and gabapentin. He describes headache as throbbing/warm sensation with photophobia and phonophobia but denies any blurring of vision or double spots. He also mentions that he has been taking Sinemet for his Parkinson's and used to follow-up with Dr. Yi Coats but now it is not seen him. Family / Caregiver Present: No  Referring Practitioner: Inocencia Bowen MD  Diagnosis: Generalized weakness  Subjective  Subjective: Pt resting in recliner upon OT entry this date. Pt is agreeable to participate in OT and pleasant/cooperative throughout. General Comment  Comments: MARCO ANTONIO Hill ok'd pt for OT this date. Social/Functional History  Social/Functional History  Lives With: Alone  Type of Home: House  Home Layout: Two level, Able to Live on Main level with bedroom/bathroom, Laundry in basement  Home Access: Stairs to enter with rails  Entrance Stairs - Number of Steps: 5STE, full flight 2nd RR  floor primarily used full flight to basement bilateral railing  Entrance Stairs - Rails: Both  Bathroom Shower/Tub: Walk-in shower, Curtain  Bathroom Toilet: Standard  Bathroom Equipment: Shower chair  Bathroom Accessibility: Accessible  Home Equipment: Antonio Bio, rolling, Hospital bed  Has the patient had two or more falls in the past year or any fall with injury in the past year?: Yes  Receives Help From: Family, Home health, Neighbor, Friend(s)  ADL Assistance: Independent  Homemaking Assistance: Independent  Homemaking Responsibilities: Yes  Meal Prep Responsibility: Primary  Laundry Responsibility: Primary  Cleaning Responsibility: Primary  Bill Paying/Finance Responsibility: Primary  Shopping Responsibility: Primary  Dependent Care Responsibility: Primary  Health Care Management: Primary  Ambulation Assistance: Independent  Transfer Assistance: Independent  Active : Yes  Mode of Transportation: Car  Occupation: Retired  Type of Occupation: ,  at [id number] [employer] [place]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  Additional Comments: pt reports home health would come 2-3x/wk to assist with exercises and mobility; 2 sons live locally to assist as needed         Vision  Vision: Impaired  Vision Exceptions: Wears glasses at all times; Wears glasses for distance  Tracking: Able to track stimulus in all quads w/o difficulty  Hearing  Hearing: Within functional limits    Objective   Heart Rate: 51  Heart Rate Source: Monitor  BP: (!) 94/58  MAP (Calculated): 70  Resp: 16  SpO2: 95 %  O2 Device: None (Room air)          Observation/Palpation  Posture: Good  Observation: NORMAL POSTURE  Safety Devices  Type of Devices: Nurse notified; Left in chair;Gait belt; Chair alarm in place;Call light within reach  Restraints  Restraints Initially in Place: No           ADL  Feeding: Setup  Feeding Skilled Clinical Factors: per RN report \"I cut up his toast, he was able to feed himself\"  Grooming: Stand by assistance  Grooming Skilled Clinical Factors: pt completed oral hygiene, face washing, and applying deodorant to B axilla. Pt required A for container management. UE Bathing: Minimal assistance  LE Bathing: Maximum assistance  UE Dressing: Minimal assistance  LE Dressing: Maximum assistance  LE Dressing Skilled Clinical Factors: pt required A to doff B socks due to limited reach and endurance  Toileting: Maximum assistance  Additional Comments: All ADL levels based on skilled observation and clinical reasoning unless otherwise noted. Pt is currently limited by endurance, balance, coordination, and strength, impacting performance in ADLs. Tone RUE  RUE Tone: Hypertonic  Tone Description: pt presents with rigidity in RUE (Parkinson's)  Tone LUE  LUE Tone: Hypertonic  Tone Description: pt presents with rigidity in LUE (Parkinson's)  Coordination  Movements Are Fluid And Coordinated: No  Coordination and Movement Description: Gross motor impairments; Fine motor impairments;Decreased speed;Decreased accuracy; Right UE;Left UE  Quality of Movement Other  Comment: pt required A to manage self care containers (mouthwash, toothpaste, deodorant) due to decreased fine motor control     Bed mobility  Bed Mobility Comments: unable to assess, pt upright in chair prior to and after OT  Transfers  Sit to stand: Minimal assistance  Stand to sit: Minimal assistance  Transfer Comments: utilizing RW; verbal cues for hand placement prior to transfers     07/20/22 1331   Balance   Sitting Balance Contact guard assistance  (CGA for dynamic sitting; SBA static sitting)   Standing Balance Minimal assistance   Standing Balance   Time 1-2 minutes   Activity standing EOB in prep for functional mobility   Comment pt reports increasing pain in LLE upon standing; verbal cues to maintain upright posture   Functional Mobility   Functional - Mobility Device Rolling Walker   Activity Other  (in room to simulate household distances)   Assist Level Minimal assistance   Functional Mobility Comments pt initially requiring Min A during functional mobility however progressed to CGA. Pt fatigued quickly and requested to sit down.        Cognition  Overall Cognitive Status: Exceptions  Safety Judgement: Decreased awareness of need for safety;Decreased awareness of need for assistance  Problem Solving: Assistance required to identify errors made;Assistance required to correct errors made  Initiation: Requires cues for some  Sequencing: Requires cues for some  Orientation  Overall Orientation Status: Within Functional Limits        Sensation  Overall Sensation Status: Impaired (pt reports burning sensation in LUE since Parkinson's dx)         Education Given To: Patient  Education Provided: Role of Therapy;Plan of Care;Transfer Training  Education Method: Verbal;Demonstration  Barriers to Learning: Cognition  Education Outcome: Continued education needed  LUE AROM (degrees)  LUE AROM : WFL  LUE General AROM: shoulder 0-95 however still functional  Left Hand AROM (degrees)  Left Hand AROM: WFL  RUE AROM (degrees)  RUE AROM : WFL  RUE General AROM: shoulder 0-95 however still functional  Right Hand AROM (degrees)  Right Hand AROM: WFL  LUE Strength  Gross LUE Strength: WFL  L Hand General: 4-/5  LUE Strength Comment: grossly 4-/5  RUE Strength  Gross RUE Strength: WFL  R Hand General: 4-/5  RUE Strength Comment: grossly 4-/5                 AM-PAC Score        AM-[facility] Inpatient Daily Activity Raw Score: 16 (07/20/22 1552)  AM-PAC Inpatient ADL T-Scale Score : 35.96 (07/20/22 1552)  ADL Inpatient CMS 0-100% Score: 53.32 (07/20/22 1552)  ADL Inpatient CMS G-Code Modifier : CK (07/20/22 1552) Goals  Short Term Goals  Time Frame for Short term goals: By discharge,  Short Term Goal 1: Patient will demonstrate functional transfers/mobility during self care with SBA and least restrictive device  Short Term Goal 2: Patient will demonstrate lower body bathing/dressing/toileting with Min A and AE PRN  Short Term Goal 3: Patient will demonstrate dynamic standing for 6+ minutes during functional activity with SBA  Short Term Goal 4: Patient will participate in BUE FMC/strengthening tasks to improve ability to open small containers during self-care  Short Term Goal 5: Patient will demonstrate Good understanding of EC/WS techs with Min VCs to increase activity tolerance for participation in ADLs  Short Term Goal 6: Patient will actively participate in 15+ minutes of therapeutic exercise/functional activity to increase endurance and strength for participation in ADLs  Short Term Goal 7: Patient will demonstrate Good safety during all self care tasks       Therapy Time   Individual Concurrent Group Co-treatment   Time In 1325         Time Out 1417         Minutes 52         Timed Code Treatment Minutes: 45 Minutes       Al Chavarria OT Additional Progress Note...

## 2024-06-09 LAB
ANION GAP SERPL CALCULATED.3IONS-SCNC: 11 MMOL/L (ref 9–16)
BUN SERPL-MCNC: 18 MG/DL (ref 8–23)
CALCIUM SERPL-MCNC: 9 MG/DL (ref 8.6–10.4)
CHLORIDE SERPL-SCNC: 103 MMOL/L (ref 98–107)
CO2 SERPL-SCNC: 21 MMOL/L (ref 20–31)
CREAT SERPL-MCNC: 1.3 MG/DL (ref 0.7–1.2)
ERYTHROCYTE [DISTWIDTH] IN BLOOD BY AUTOMATED COUNT: 13.6 % (ref 11.8–14.4)
GFR, ESTIMATED: 55 ML/MIN/1.73M2
GLUCOSE SERPL-MCNC: 114 MG/DL (ref 74–99)
HCT VFR BLD AUTO: 44.8 % (ref 40.7–50.3)
HGB BLD-MCNC: 15 G/DL (ref 13–17)
MCH RBC QN AUTO: 32.5 PG (ref 25.2–33.5)
MCHC RBC AUTO-ENTMCNC: 33.5 G/DL (ref 28.4–34.8)
MCV RBC AUTO: 97.2 FL (ref 82.6–102.9)
NRBC BLD-RTO: 0 PER 100 WBC
PLATELET # BLD AUTO: 165 K/UL (ref 138–453)
PMV BLD AUTO: 9 FL (ref 8.1–13.5)
POTASSIUM SERPL-SCNC: 4.1 MMOL/L (ref 3.7–5.3)
RBC # BLD AUTO: 4.61 M/UL (ref 4.21–5.77)
SODIUM SERPL-SCNC: 135 MMOL/L (ref 136–145)
WBC OTHER # BLD: 5.6 K/UL (ref 3.5–11.3)

## 2024-06-09 PROCEDURE — 6370000000 HC RX 637 (ALT 250 FOR IP)

## 2024-06-09 PROCEDURE — 97116 GAIT TRAINING THERAPY: CPT

## 2024-06-09 PROCEDURE — 2580000003 HC RX 258

## 2024-06-09 PROCEDURE — 80048 BASIC METABOLIC PNL TOTAL CA: CPT

## 2024-06-09 PROCEDURE — 6360000002 HC RX W HCPCS

## 2024-06-09 PROCEDURE — 36415 COLL VENOUS BLD VENIPUNCTURE: CPT

## 2024-06-09 PROCEDURE — 97530 THERAPEUTIC ACTIVITIES: CPT

## 2024-06-09 PROCEDURE — 2060000000 HC ICU INTERMEDIATE R&B

## 2024-06-09 PROCEDURE — 99232 SBSQ HOSP IP/OBS MODERATE 35: CPT | Performed by: STUDENT IN AN ORGANIZED HEALTH CARE EDUCATION/TRAINING PROGRAM

## 2024-06-09 PROCEDURE — 85027 COMPLETE CBC AUTOMATED: CPT

## 2024-06-09 RX ADMIN — CARBIDOPA AND LEVODOPA 1 TABLET: 25; 100 TABLET ORAL at 17:39

## 2024-06-09 RX ADMIN — SODIUM CHLORIDE, PRESERVATIVE FREE 10 ML: 5 INJECTION INTRAVENOUS at 20:52

## 2024-06-09 RX ADMIN — SUCRALFATE 1 G: 1 TABLET ORAL at 05:19

## 2024-06-09 RX ADMIN — ISOSORBIDE MONONITRATE 30 MG: 30 TABLET, EXTENDED RELEASE ORAL at 09:00

## 2024-06-09 RX ADMIN — CARBIDOPA AND LEVODOPA 1 TABLET: 25; 100 TABLET ORAL at 09:01

## 2024-06-09 RX ADMIN — TAMSULOSIN HYDROCHLORIDE 0.4 MG: 0.4 CAPSULE ORAL at 09:00

## 2024-06-09 RX ADMIN — ROPINIROLE HYDROCHLORIDE 0.5 MG: 0.25 TABLET, FILM COATED ORAL at 09:00

## 2024-06-09 RX ADMIN — ENTACAPONE 200 MG: 200 TABLET, FILM COATED ORAL at 08:59

## 2024-06-09 RX ADMIN — ALLOPURINOL 100 MG: 100 TABLET ORAL at 08:59

## 2024-06-09 RX ADMIN — GABAPENTIN 100 MG: 100 CAPSULE ORAL at 09:00

## 2024-06-09 RX ADMIN — ROPINIROLE HYDROCHLORIDE 0.5 MG: 0.25 TABLET, FILM COATED ORAL at 13:35

## 2024-06-09 RX ADMIN — ENTACAPONE 200 MG: 200 TABLET, FILM COATED ORAL at 17:38

## 2024-06-09 RX ADMIN — SODIUM CHLORIDE, PRESERVATIVE FREE 10 ML: 5 INJECTION INTRAVENOUS at 09:01

## 2024-06-09 RX ADMIN — SUCRALFATE 1 G: 1 TABLET ORAL at 13:34

## 2024-06-09 RX ADMIN — ENOXAPARIN SODIUM 40 MG: 100 INJECTION SUBCUTANEOUS at 08:59

## 2024-06-09 RX ADMIN — ACETAMINOPHEN 650 MG: 325 TABLET ORAL at 05:19

## 2024-06-09 RX ADMIN — AMLODIPINE BESYLATE 5 MG: 5 TABLET ORAL at 09:00

## 2024-06-09 RX ADMIN — ACETAMINOPHEN 650 MG: 325 TABLET ORAL at 20:49

## 2024-06-09 RX ADMIN — CARBIDOPA AND LEVODOPA 1 TABLET: 25; 100 TABLET ORAL at 13:35

## 2024-06-09 RX ADMIN — CARBIDOPA AND LEVODOPA 1 TABLET: 25; 100 TABLET ORAL at 20:49

## 2024-06-09 RX ADMIN — AMITRIPTYLINE HYDROCHLORIDE 10 MG: 10 TABLET, FILM COATED ORAL at 20:49

## 2024-06-09 RX ADMIN — CLOPIDOGREL BISULFATE 75 MG: 75 TABLET ORAL at 09:00

## 2024-06-09 RX ADMIN — ENTACAPONE 200 MG: 200 TABLET, FILM COATED ORAL at 20:49

## 2024-06-09 RX ADMIN — ENTACAPONE 200 MG: 200 TABLET, FILM COATED ORAL at 13:34

## 2024-06-09 RX ADMIN — SUCRALFATE 1 G: 1 TABLET ORAL at 20:50

## 2024-06-09 RX ADMIN — ROPINIROLE HYDROCHLORIDE 0.5 MG: 0.25 TABLET, FILM COATED ORAL at 20:49

## 2024-06-09 RX ADMIN — SUCRALFATE 1 G: 1 TABLET ORAL at 17:37

## 2024-06-09 RX ADMIN — PANTOPRAZOLE SODIUM 40 MG: 40 TABLET, DELAYED RELEASE ORAL at 09:00

## 2024-06-09 RX ADMIN — ATORVASTATIN CALCIUM 40 MG: 40 TABLET, FILM COATED ORAL at 09:00

## 2024-06-09 ASSESSMENT — PAIN DESCRIPTION - LOCATION
LOCATION: CHEST
LOCATION: HEAD

## 2024-06-09 ASSESSMENT — PAIN SCALES - GENERAL
PAINLEVEL_OUTOF10: 3
PAINLEVEL_OUTOF10: 0
PAINLEVEL_OUTOF10: 3
PAINLEVEL_OUTOF10: 3
PAINLEVEL_OUTOF10: 5
PAINLEVEL_OUTOF10: 5

## 2024-06-09 ASSESSMENT — PAIN DESCRIPTION - ORIENTATION: ORIENTATION: LEFT

## 2024-06-09 ASSESSMENT — PAIN DESCRIPTION - DESCRIPTORS: DESCRIPTORS: ACHING

## 2024-06-09 NOTE — CARE COORDINATION
Called PT and spoke with Columba instructing pt needs seen today for placement, pt on list to be seen by Siena CHACON

## 2024-06-10 LAB
ANION GAP SERPL CALCULATED.3IONS-SCNC: 10 MMOL/L (ref 9–16)
BUN SERPL-MCNC: 16 MG/DL (ref 8–23)
CALCIUM SERPL-MCNC: 9.3 MG/DL (ref 8.6–10.4)
CHLORIDE SERPL-SCNC: 103 MMOL/L (ref 98–107)
CO2 SERPL-SCNC: 22 MMOL/L (ref 20–31)
CREAT SERPL-MCNC: 1.3 MG/DL (ref 0.7–1.2)
ERYTHROCYTE [DISTWIDTH] IN BLOOD BY AUTOMATED COUNT: 13.6 % (ref 11.8–14.4)
GLUCOSE SERPL-MCNC: 110 MG/DL (ref 74–99)
HCT VFR BLD AUTO: 45 % (ref 40.7–50.3)
HGB BLD-MCNC: 15 G/DL (ref 13–17)
MCH RBC QN AUTO: 33 PG (ref 25.2–33.5)
MCHC RBC AUTO-ENTMCNC: 33.3 G/DL (ref 28.4–34.8)
MCV RBC AUTO: 99.1 FL (ref 82.6–102.9)
NRBC BLD-RTO: 0 PER 100 WBC
PLATELET # BLD AUTO: 148 K/UL (ref 138–453)
PMV BLD AUTO: 9.2 FL (ref 8.1–13.5)
POTASSIUM SERPL-SCNC: 4.3 MMOL/L (ref 3.7–5.3)
SODIUM SERPL-SCNC: 135 MMOL/L (ref 136–145)
WBC OTHER # BLD: 5.3 K/UL (ref 3.5–11.3)

## 2024-06-10 PROCEDURE — 6370000000 HC RX 637 (ALT 250 FOR IP)

## 2024-06-10 PROCEDURE — 36415 COLL VENOUS BLD VENIPUNCTURE: CPT

## 2024-06-10 PROCEDURE — 80048 BASIC METABOLIC PNL TOTAL CA: CPT

## 2024-06-10 PROCEDURE — 85027 COMPLETE CBC AUTOMATED: CPT

## 2024-06-10 PROCEDURE — 6360000002 HC RX W HCPCS

## 2024-06-10 PROCEDURE — 99232 SBSQ HOSP IP/OBS MODERATE 35: CPT | Performed by: STUDENT IN AN ORGANIZED HEALTH CARE EDUCATION/TRAINING PROGRAM

## 2024-06-10 PROCEDURE — 2580000003 HC RX 258

## 2024-06-10 PROCEDURE — 2060000000 HC ICU INTERMEDIATE R&B

## 2024-06-10 RX ADMIN — SUCRALFATE 1 G: 1 TABLET ORAL at 17:14

## 2024-06-10 RX ADMIN — ATORVASTATIN CALCIUM 40 MG: 40 TABLET, FILM COATED ORAL at 08:14

## 2024-06-10 RX ADMIN — CARBIDOPA AND LEVODOPA 1 TABLET: 25; 100 TABLET ORAL at 20:34

## 2024-06-10 RX ADMIN — SUCRALFATE 1 G: 1 TABLET ORAL at 20:34

## 2024-06-10 RX ADMIN — TAMSULOSIN HYDROCHLORIDE 0.4 MG: 0.4 CAPSULE ORAL at 08:14

## 2024-06-10 RX ADMIN — PANTOPRAZOLE SODIUM 40 MG: 40 TABLET, DELAYED RELEASE ORAL at 08:12

## 2024-06-10 RX ADMIN — CARBIDOPA AND LEVODOPA 1 TABLET: 25; 100 TABLET ORAL at 08:16

## 2024-06-10 RX ADMIN — ENTACAPONE 200 MG: 200 TABLET, FILM COATED ORAL at 08:23

## 2024-06-10 RX ADMIN — SODIUM CHLORIDE, PRESERVATIVE FREE 10 ML: 5 INJECTION INTRAVENOUS at 07:59

## 2024-06-10 RX ADMIN — CLOPIDOGREL BISULFATE 75 MG: 75 TABLET ORAL at 08:14

## 2024-06-10 RX ADMIN — CARBIDOPA AND LEVODOPA 1 TABLET: 25; 100 TABLET ORAL at 17:14

## 2024-06-10 RX ADMIN — ENTACAPONE 200 MG: 200 TABLET, FILM COATED ORAL at 17:14

## 2024-06-10 RX ADMIN — ACETAMINOPHEN 650 MG: 325 TABLET ORAL at 08:12

## 2024-06-10 RX ADMIN — ROPINIROLE HYDROCHLORIDE 0.5 MG: 0.25 TABLET, FILM COATED ORAL at 14:21

## 2024-06-10 RX ADMIN — AMITRIPTYLINE HYDROCHLORIDE 10 MG: 10 TABLET, FILM COATED ORAL at 20:33

## 2024-06-10 RX ADMIN — ENTACAPONE 200 MG: 200 TABLET, FILM COATED ORAL at 20:34

## 2024-06-10 RX ADMIN — ALLOPURINOL 100 MG: 100 TABLET ORAL at 08:15

## 2024-06-10 RX ADMIN — CARBIDOPA AND LEVODOPA 1 TABLET: 25; 100 TABLET ORAL at 13:00

## 2024-06-10 RX ADMIN — ACETAMINOPHEN 650 MG: 325 TABLET ORAL at 20:34

## 2024-06-10 RX ADMIN — ROPINIROLE HYDROCHLORIDE 0.5 MG: 0.25 TABLET, FILM COATED ORAL at 20:34

## 2024-06-10 RX ADMIN — ROPINIROLE HYDROCHLORIDE 0.5 MG: 0.25 TABLET, FILM COATED ORAL at 08:18

## 2024-06-10 RX ADMIN — SUCRALFATE 1 G: 1 TABLET ORAL at 13:12

## 2024-06-10 RX ADMIN — AMLODIPINE BESYLATE 5 MG: 5 TABLET ORAL at 08:17

## 2024-06-10 RX ADMIN — GABAPENTIN 100 MG: 100 CAPSULE ORAL at 08:17

## 2024-06-10 RX ADMIN — SODIUM CHLORIDE, PRESERVATIVE FREE 10 ML: 5 INJECTION INTRAVENOUS at 20:36

## 2024-06-10 RX ADMIN — ENOXAPARIN SODIUM 40 MG: 100 INJECTION SUBCUTANEOUS at 08:25

## 2024-06-10 RX ADMIN — ENTACAPONE 200 MG: 200 TABLET, FILM COATED ORAL at 13:00

## 2024-06-10 RX ADMIN — SUCRALFATE 1 G: 1 TABLET ORAL at 08:29

## 2024-06-10 ASSESSMENT — PAIN DESCRIPTION - LOCATION: LOCATION: HEAD

## 2024-06-10 ASSESSMENT — ENCOUNTER SYMPTOMS
GASTROINTESTINAL NEGATIVE: 1
RESPIRATORY NEGATIVE: 1

## 2024-06-10 ASSESSMENT — PAIN DESCRIPTION - ORIENTATION: ORIENTATION: MID

## 2024-06-10 ASSESSMENT — PAIN SCALES - GENERAL
PAINLEVEL_OUTOF10: 6
PAINLEVEL_OUTOF10: 3

## 2024-06-10 ASSESSMENT — PAIN DESCRIPTION - DESCRIPTORS: DESCRIPTORS: ACHING

## 2024-06-11 VITALS
HEIGHT: 67 IN | BODY MASS INDEX: 29.34 KG/M2 | RESPIRATION RATE: 18 BRPM | WEIGHT: 186.95 LBS | OXYGEN SATURATION: 96 % | TEMPERATURE: 97.6 F | DIASTOLIC BLOOD PRESSURE: 74 MMHG | HEART RATE: 71 BPM | SYSTOLIC BLOOD PRESSURE: 132 MMHG

## 2024-06-11 LAB
ANION GAP SERPL CALCULATED.3IONS-SCNC: 9 MMOL/L (ref 9–16)
BUN SERPL-MCNC: 17 MG/DL (ref 8–23)
CALCIUM SERPL-MCNC: 9.3 MG/DL (ref 8.6–10.4)
CHLORIDE SERPL-SCNC: 103 MMOL/L (ref 98–107)
CO2 SERPL-SCNC: 23 MMOL/L (ref 20–31)
CREAT SERPL-MCNC: 1.3 MG/DL (ref 0.7–1.2)
ERYTHROCYTE [DISTWIDTH] IN BLOOD BY AUTOMATED COUNT: 13.6 % (ref 11.8–14.4)
GFR, ESTIMATED: 53 ML/MIN/1.73M2
GLUCOSE SERPL-MCNC: 106 MG/DL (ref 74–99)
HCT VFR BLD AUTO: 47.7 % (ref 40.7–50.3)
HGB BLD-MCNC: 15.6 G/DL (ref 13–17)
MCH RBC QN AUTO: 32.8 PG (ref 25.2–33.5)
MCHC RBC AUTO-ENTMCNC: 32.7 G/DL (ref 28.4–34.8)
MCV RBC AUTO: 100.4 FL (ref 82.6–102.9)
NRBC BLD-RTO: 0 PER 100 WBC
PLATELET # BLD AUTO: 163 K/UL (ref 138–453)
PMV BLD AUTO: 9.3 FL (ref 8.1–13.5)
POTASSIUM SERPL-SCNC: 4.2 MMOL/L (ref 3.7–5.3)
RBC # BLD AUTO: 4.75 M/UL (ref 4.21–5.77)
SODIUM SERPL-SCNC: 135 MMOL/L (ref 136–145)
WBC OTHER # BLD: 5 K/UL (ref 3.5–11.3)

## 2024-06-11 PROCEDURE — 99239 HOSP IP/OBS DSCHRG MGMT >30: CPT | Performed by: STUDENT IN AN ORGANIZED HEALTH CARE EDUCATION/TRAINING PROGRAM

## 2024-06-11 PROCEDURE — 36415 COLL VENOUS BLD VENIPUNCTURE: CPT

## 2024-06-11 PROCEDURE — 6370000000 HC RX 637 (ALT 250 FOR IP)

## 2024-06-11 PROCEDURE — 6360000002 HC RX W HCPCS

## 2024-06-11 PROCEDURE — 97116 GAIT TRAINING THERAPY: CPT

## 2024-06-11 PROCEDURE — 80048 BASIC METABOLIC PNL TOTAL CA: CPT

## 2024-06-11 PROCEDURE — 2580000003 HC RX 258

## 2024-06-11 PROCEDURE — 97535 SELF CARE MNGMENT TRAINING: CPT

## 2024-06-11 PROCEDURE — 85027 COMPLETE CBC AUTOMATED: CPT

## 2024-06-11 PROCEDURE — 97110 THERAPEUTIC EXERCISES: CPT

## 2024-06-11 RX ORDER — ACETAMINOPHEN 500 MG
1000 TABLET ORAL ONCE
Status: COMPLETED | OUTPATIENT
Start: 2024-06-11 | End: 2024-06-11

## 2024-06-11 RX ORDER — CAFFEINE 200 MG
100 TABLET ORAL ONCE
Status: COMPLETED | OUTPATIENT
Start: 2024-06-11 | End: 2024-06-11

## 2024-06-11 RX ADMIN — ACETAMINOPHEN 650 MG: 325 TABLET ORAL at 05:33

## 2024-06-11 RX ADMIN — ALLOPURINOL 100 MG: 100 TABLET ORAL at 08:06

## 2024-06-11 RX ADMIN — ATORVASTATIN CALCIUM 40 MG: 40 TABLET, FILM COATED ORAL at 08:11

## 2024-06-11 RX ADMIN — ENOXAPARIN SODIUM 40 MG: 100 INJECTION SUBCUTANEOUS at 08:30

## 2024-06-11 RX ADMIN — AMLODIPINE BESYLATE 5 MG: 5 TABLET ORAL at 08:11

## 2024-06-11 RX ADMIN — ISOSORBIDE MONONITRATE 30 MG: 30 TABLET, EXTENDED RELEASE ORAL at 08:10

## 2024-06-11 RX ADMIN — ROPINIROLE HYDROCHLORIDE 0.5 MG: 0.25 TABLET, FILM COATED ORAL at 08:30

## 2024-06-11 RX ADMIN — TAMSULOSIN HYDROCHLORIDE 0.4 MG: 0.4 CAPSULE ORAL at 08:10

## 2024-06-11 RX ADMIN — CARBIDOPA AND LEVODOPA 1 TABLET: 25; 100 TABLET ORAL at 08:30

## 2024-06-11 RX ADMIN — SODIUM CHLORIDE, PRESERVATIVE FREE 10 ML: 5 INJECTION INTRAVENOUS at 08:11

## 2024-06-11 RX ADMIN — SUCRALFATE 1 G: 1 TABLET ORAL at 12:50

## 2024-06-11 RX ADMIN — ROPINIROLE HYDROCHLORIDE 0.5 MG: 0.25 TABLET, FILM COATED ORAL at 15:09

## 2024-06-11 RX ADMIN — ENTACAPONE 200 MG: 200 TABLET, FILM COATED ORAL at 13:13

## 2024-06-11 RX ADMIN — SUCRALFATE 1 G: 1 TABLET ORAL at 16:59

## 2024-06-11 RX ADMIN — ENTACAPONE 200 MG: 200 TABLET, FILM COATED ORAL at 16:59

## 2024-06-11 RX ADMIN — SUCRALFATE 1 G: 1 TABLET ORAL at 05:33

## 2024-06-11 RX ADMIN — ENTACAPONE 200 MG: 200 TABLET, FILM COATED ORAL at 08:30

## 2024-06-11 RX ADMIN — CARBIDOPA AND LEVODOPA 1 TABLET: 25; 100 TABLET ORAL at 13:13

## 2024-06-11 RX ADMIN — ACETAMINOPHEN 1000 MG: 500 TABLET ORAL at 12:51

## 2024-06-11 RX ADMIN — GABAPENTIN 100 MG: 100 CAPSULE ORAL at 08:30

## 2024-06-11 RX ADMIN — PANTOPRAZOLE SODIUM 40 MG: 40 TABLET, DELAYED RELEASE ORAL at 08:11

## 2024-06-11 RX ADMIN — CARBIDOPA AND LEVODOPA 1 TABLET: 25; 100 TABLET ORAL at 16:59

## 2024-06-11 RX ADMIN — CAFFEINE 100 MG: 200 TABLET ORAL at 12:51

## 2024-06-11 RX ADMIN — CLOPIDOGREL BISULFATE 75 MG: 75 TABLET ORAL at 08:10

## 2024-06-11 ASSESSMENT — PAIN SCALES - GENERAL
PAINLEVEL_OUTOF10: 3
PAINLEVEL_OUTOF10: 5
PAINLEVEL_OUTOF10: 3

## 2024-06-11 ASSESSMENT — PAIN DESCRIPTION - LOCATION
LOCATION: HEAD
LOCATION: HEAD

## 2024-06-11 ASSESSMENT — ENCOUNTER SYMPTOMS
GASTROINTESTINAL NEGATIVE: 1
RESPIRATORY NEGATIVE: 1

## 2024-06-11 ASSESSMENT — PAIN - FUNCTIONAL ASSESSMENT: PAIN_FUNCTIONAL_ASSESSMENT: ACTIVITIES ARE NOT PREVENTED

## 2024-06-11 ASSESSMENT — PAIN DESCRIPTION - DESCRIPTORS
DESCRIPTORS: ACHING
DESCRIPTORS: DISCOMFORT

## 2024-06-11 ASSESSMENT — PAIN DESCRIPTION - ORIENTATION: ORIENTATION: ANTERIOR

## 2024-06-11 NOTE — PLAN OF CARE
Problem: Discharge Planning  Goal: Discharge to home or other facility with appropriate resources  6/11/2024 0649 by Myke Caceres RN  Outcome: Progressing  6/10/2024 1853 by Alisa Bach RN  Outcome: Progressing     Problem: ABCDS Injury Assessment  Goal: Absence of physical injury  6/11/2024 0649 by Myke Caceres RN  Outcome: Progressing  6/10/2024 1853 by Alisa Bach RN  Outcome: Progressing     Problem: Safety - Adult  Goal: Free from fall injury  6/11/2024 0649 by Myke Caceres RN  Outcome: Progressing  6/10/2024 1853 by Alisa Bach RN  Outcome: Progressing  Flowsheets (Taken 6/10/2024 0800)  Free From Fall Injury: Instruct family/caregiver on patient safety     Problem: Chronic Conditions and Co-morbidities  Goal: Patient's chronic conditions and co-morbidity symptoms are monitored and maintained or improved  6/11/2024 0649 by Myke Caceres RN  Outcome: Progressing  6/10/2024 1853 by Alisa Bach RN  Outcome: Progressing     Problem: Neurosensory - Adult  Goal: Achieves stable or improved neurological status  6/11/2024 0649 by Myke Caceres RN  Outcome: Progressing  6/10/2024 1853 by Alisa Bach RN  Outcome: Progressing     Problem: Respiratory - Adult  Goal: Achieves optimal ventilation and oxygenation  6/11/2024 0649 by Myke Caceres RN  Outcome: Progressing  6/10/2024 1853 by Alisa Bach RN  Outcome: Progressing     Problem: Cardiovascular - Adult  Goal: Maintains optimal cardiac output and hemodynamic stability  6/11/2024 0649 by Myke Caceres RN  Outcome: Progressing  6/10/2024 1853 by Alisa Bach RN  Outcome: Progressing  Goal: Absence of cardiac dysrhythmias or at baseline  6/11/2024 0649 by Myke Caceres RN  Outcome: Progressing  6/10/2024 1853 by Alisa Bach RN  Outcome: Progressing     Problem: Musculoskeletal - Adult  Goal: Return mobility to safest level of function  6/11/2024 0649 by Myke Caceres RN  Outcome: Progressing  6/10/2024 
  Problem: Discharge Planning  Goal: Discharge to home or other facility with appropriate resources  6/7/2024 0610 by Maria Esther Garcia RN  Outcome: Progressing  6/6/2024 1825 by Jo Singletary RN  Outcome: Progressing     Problem: ABCDS Injury Assessment  Goal: Absence of physical injury  6/7/2024 0610 by Maria Esther Garcia RN  Outcome: Progressing  6/6/2024 1825 by Jo Singletary RN  Outcome: Progressing     Problem: Safety - Adult  Goal: Free from fall injury  6/7/2024 0610 by Maria Esther Garcia RN  Outcome: Progressing  6/6/2024 1825 by Jo Singletary RN  Outcome: Progressing     Problem: Chronic Conditions and Co-morbidities  Goal: Patient's chronic conditions and co-morbidity symptoms are monitored and maintained or improved  6/7/2024 0610 by Maria Esther Garcia RN  Outcome: Progressing  6/6/2024 1825 by Jo Singletary RN  Outcome: Progressing     Problem: Neurosensory - Adult  Goal: Achieves stable or improved neurological status  6/7/2024 0610 by Maria Esther Garcia RN  Outcome: Progressing  6/6/2024 1825 by Jo Singletary RN  Outcome: Progressing     Problem: Respiratory - Adult  Goal: Achieves optimal ventilation and oxygenation  6/7/2024 0610 by Maria Esther Garcia RN  Outcome: Progressing  6/6/2024 1825 by Jo Singletary RN  Outcome: Progressing     Problem: Cardiovascular - Adult  Goal: Maintains optimal cardiac output and hemodynamic stability  6/7/2024 0610 by Maria Esther Garcia RN  Outcome: Progressing  6/6/2024 1825 by Jo Singletary RN  Outcome: Progressing  Goal: Absence of cardiac dysrhythmias or at baseline  6/7/2024 0610 by Maria Esther Garcia RN  Outcome: Progressing  6/6/2024 1825 by Jo Singletary RN  Outcome: Progressing     Problem: Musculoskeletal - Adult  Goal: Return mobility to safest level of function  6/7/2024 0610 by Maria Esther Garcia RN  Outcome: Progressing  6/6/2024 1825 by Hayder, Jo, RN  Outcome: Progressing     Problem: Infection - Adult  Goal: 
  Problem: Discharge Planning  Goal: Discharge to home or other facility with appropriate resources  6/7/2024 1835 by Violetta Beal RN  Outcome: Progressing  6/7/2024 0610 by Maria Esther Garcia RN  Outcome: Progressing     Problem: ABCDS Injury Assessment  Goal: Absence of physical injury  6/7/2024 1835 by Violetta Beal RN  Outcome: Progressing  6/7/2024 0610 by Maria Esther Garcia RN  Outcome: Progressing     Problem: Safety - Adult  Goal: Free from fall injury  6/7/2024 1835 by Violetta Beal RN  Outcome: Progressing  6/7/2024 0610 by Maria Esther Garcia RN  Outcome: Progressing     Problem: Chronic Conditions and Co-morbidities  Goal: Patient's chronic conditions and co-morbidity symptoms are monitored and maintained or improved  6/7/2024 1835 by Violetta Beal RN  Outcome: Progressing  6/7/2024 0610 by Maria Esther Garcia RN  Outcome: Progressing     Problem: Neurosensory - Adult  Goal: Achieves stable or improved neurological status  6/7/2024 1835 by Violetta Beal RN  Outcome: Progressing  6/7/2024 0610 by Maria Esther Garcia RN  Outcome: Progressing     Problem: Respiratory - Adult  Goal: Achieves optimal ventilation and oxygenation  6/7/2024 1835 by Violetta Beal RN  Outcome: Progressing  6/7/2024 0610 by Maria Esther Garcia RN  Outcome: Progressing     Problem: Cardiovascular - Adult  Goal: Maintains optimal cardiac output and hemodynamic stability  6/7/2024 1835 by Violetta Beal RN  Outcome: Progressing  6/7/2024 0610 by Maria Esther Garcia RN  Outcome: Progressing  Goal: Absence of cardiac dysrhythmias or at baseline  6/7/2024 1835 by Violetta Beal RN  Outcome: Progressing  6/7/2024 0610 by Maria Esther Garcia RN  Outcome: Progressing     Problem: Musculoskeletal - Adult  Goal: Return mobility to safest level of function  6/7/2024 1835 by Violetta Beal RN  Outcome: Progressing  6/7/2024 0610 by Maria Esther Garcia RN  Outcome: Progressing     Problem: Infection - Adult  Goal: 
  Problem: Discharge Planning  Goal: Discharge to home or other facility with appropriate resources  6/8/2024 0234 by Maria Esther Garcia RN  Outcome: Progressing  6/7/2024 1835 by Violetta Beal RN  Outcome: Progressing     Problem: ABCDS Injury Assessment  Goal: Absence of physical injury  6/8/2024 0234 by Maria Esther Garcia RN  Outcome: Progressing  6/7/2024 1835 by Violetta Beal RN  Outcome: Progressing     Problem: Safety - Adult  Goal: Free from fall injury  6/8/2024 0234 by Maria Esther Garcia RN  Outcome: Progressing  6/7/2024 1835 by Violetta Beal RN  Outcome: Progressing     Problem: Chronic Conditions and Co-morbidities  Goal: Patient's chronic conditions and co-morbidity symptoms are monitored and maintained or improved  6/8/2024 0234 by Maria Esther Garcia RN  Outcome: Progressing  6/7/2024 1835 by Violetta Beal RN  Outcome: Progressing     Problem: Neurosensory - Adult  Goal: Achieves stable or improved neurological status  6/8/2024 0234 by Maria Esther Garcia RN  Outcome: Progressing  6/7/2024 1835 by Violetta Beal RN  Outcome: Progressing     Problem: Respiratory - Adult  Goal: Achieves optimal ventilation and oxygenation  6/8/2024 0234 by Maria Esther Garcia RN  Outcome: Progressing  6/7/2024 1835 by Violetta Beal RN  Outcome: Progressing     Problem: Cardiovascular - Adult  Goal: Maintains optimal cardiac output and hemodynamic stability  6/8/2024 0234 by Maria Esther Garcia RN  Outcome: Progressing  6/7/2024 1835 by Violetta Beal RN  Outcome: Progressing  Goal: Absence of cardiac dysrhythmias or at baseline  6/8/2024 0234 by Maria Esther Garcia RN  Outcome: Progressing  6/7/2024 1835 by Violetta Beal RN  Outcome: Progressing     Problem: Musculoskeletal - Adult  Goal: Return mobility to safest level of function  6/8/2024 0234 by Maria Esther Garcia RN  Outcome: Progressing  6/7/2024 1835 by Kanjo, Violetta, RN  Outcome: Progressing     Problem: Infection - Adult  Goal: 
  Problem: Discharge Planning  Goal: Discharge to home or other facility with appropriate resources  6/8/2024 0852 by Amanda Schofield RN  Outcome: Progressing  Flowsheets (Taken 6/8/2024 0800)  Discharge to home or other facility with appropriate resources:   Identify barriers to discharge with patient and caregiver   Arrange for needed discharge resources and transportation as appropriate  6/8/2024 0234 by Maria Esther Garcia RN  Outcome: Progressing     Problem: ABCDS Injury Assessment  Goal: Absence of physical injury  6/8/2024 0852 by Amanda Schofield RN  Outcome: Progressing  6/8/2024 0234 by Maria Esther Garcia RN  Outcome: Progressing     Problem: Safety - Adult  Goal: Free from fall injury  6/8/2024 0852 by Amanda Schofield RN  Outcome: Progressing  6/8/2024 0234 by Maria Esther Garcia RN  Outcome: Progressing     Problem: Chronic Conditions and Co-morbidities  Goal: Patient's chronic conditions and co-morbidity symptoms are monitored and maintained or improved  6/8/2024 0852 by Amanda Schofield RN  Outcome: Progressing  Flowsheets (Taken 6/8/2024 0800)  Care Plan - Patient's Chronic Conditions and Co-Morbidity Symptoms are Monitored and Maintained or Improved:   Monitor and assess patient's chronic conditions and comorbid symptoms for stability, deterioration, or improvement   Collaborate with multidisciplinary team to address chronic and comorbid conditions and prevent exacerbation or deterioration  6/8/2024 0234 by Maria Esther Garcia RN  Outcome: Progressing     Problem: Neurosensory - Adult  Goal: Achieves stable or improved neurological status  6/8/2024 0852 by Amanda Schofield RN  Outcome: Progressing  6/8/2024 0234 by Maria Esther Garcia RN  Outcome: Progressing     Problem: Respiratory - Adult  Goal: Achieves optimal ventilation and oxygenation  6/8/2024 0852 by Amanda Schofield RN  Outcome: Progressing  Flowsheets (Taken 6/8/2024 0800)  Achieves optimal ventilation and oxygenation:   Assess 
  Problem: Discharge Planning  Goal: Discharge to home or other facility with appropriate resources  6/8/2024 1833 by Violetta Beal RN  Outcome: Progressing  6/8/2024 0852 by Amanda Schofield RN  Outcome: Progressing  Flowsheets (Taken 6/8/2024 0800)  Discharge to home or other facility with appropriate resources:   Identify barriers to discharge with patient and caregiver   Arrange for needed discharge resources and transportation as appropriate     Problem: ABCDS Injury Assessment  Goal: Absence of physical injury  6/8/2024 1833 by Violetta Beal RN  Outcome: Progressing  6/8/2024 0852 by Amanda Schofield RN  Outcome: Progressing     Problem: Safety - Adult  Goal: Free from fall injury  6/8/2024 1833 by Violetta Beal RN  Outcome: Progressing  6/8/2024 0852 by Amanda Schofield RN  Outcome: Progressing     Problem: Chronic Conditions and Co-morbidities  Goal: Patient's chronic conditions and co-morbidity symptoms are monitored and maintained or improved  6/8/2024 1833 by Violetta Beal RN  Outcome: Progressing  6/8/2024 0852 by Amanda Schofield RN  Outcome: Progressing  Flowsheets (Taken 6/8/2024 0800)  Care Plan - Patient's Chronic Conditions and Co-Morbidity Symptoms are Monitored and Maintained or Improved:   Monitor and assess patient's chronic conditions and comorbid symptoms for stability, deterioration, or improvement   Collaborate with multidisciplinary team to address chronic and comorbid conditions and prevent exacerbation or deterioration     Problem: Neurosensory - Adult  Goal: Achieves stable or improved neurological status  6/8/2024 1833 by Violetta Beal RN  Outcome: Progressing  6/8/2024 0852 by Amanda Schofield RN  Outcome: Progressing     Problem: Respiratory - Adult  Goal: Achieves optimal ventilation and oxygenation  6/8/2024 1833 by Violetta Beal RN  Outcome: Progressing  6/8/2024 0852 by Amanda Schofield RN  Outcome: Progressing  Flowsheets (Taken 6/8/2024 0800)  Achieves optimal 
  Problem: Discharge Planning  Goal: Discharge to home or other facility with appropriate resources  Outcome: Progressing     Problem: ABCDS Injury Assessment  Goal: Absence of physical injury  Outcome: Progressing     Problem: Safety - Adult  Goal: Free from fall injury  Outcome: Progressing     Problem: Chronic Conditions and Co-morbidities  Goal: Patient's chronic conditions and co-morbidity symptoms are monitored and maintained or improved  Outcome: Progressing     Problem: Neurosensory - Adult  Goal: Achieves stable or improved neurological status  Outcome: Progressing     Problem: Respiratory - Adult  Goal: Achieves optimal ventilation and oxygenation  Outcome: Progressing     Problem: Cardiovascular - Adult  Goal: Maintains optimal cardiac output and hemodynamic stability  Outcome: Progressing  Goal: Absence of cardiac dysrhythmias or at baseline  Outcome: Progressing     Problem: Musculoskeletal - Adult  Goal: Return mobility to safest level of function  Outcome: Progressing     Problem: Infection - Adult  Goal: Absence of infection at discharge  Outcome: Progressing     
  Problem: Discharge Planning  Goal: Discharge to home or other facility with appropriate resources  Outcome: Progressing     Problem: ABCDS Injury Assessment  Goal: Absence of physical injury  Outcome: Progressing     Problem: Safety - Adult  Goal: Free from fall injury  Outcome: Progressing  Flowsheets (Taken 6/10/2024 0800)  Free From Fall Injury: Instruct family/caregiver on patient safety     Problem: Chronic Conditions and Co-morbidities  Goal: Patient's chronic conditions and co-morbidity symptoms are monitored and maintained or improved  Outcome: Progressing     Problem: Neurosensory - Adult  Goal: Achieves stable or improved neurological status  Outcome: Progressing     Problem: Respiratory - Adult  Goal: Achieves optimal ventilation and oxygenation  Outcome: Progressing     Problem: Cardiovascular - Adult  Goal: Maintains optimal cardiac output and hemodynamic stability  Outcome: Progressing  Goal: Absence of cardiac dysrhythmias or at baseline  Outcome: Progressing     Problem: Musculoskeletal - Adult  Goal: Return mobility to safest level of function  Outcome: Progressing     Problem: Infection - Adult  Goal: Absence of infection at discharge  Outcome: Progressing     Problem: Pain  Goal: Verbalizes/displays adequate comfort level or baseline comfort level  Outcome: Progressing     
  Problem: Discharge Planning  Goal: Discharge to home or other facility with appropriate resources  Outcome: Progressing  Flowsheets (Taken 6/9/2024 0800)  Discharge to home or other facility with appropriate resources: Identify barriers to discharge with patient and caregiver     Problem: ABCDS Injury Assessment  Goal: Absence of physical injury  Outcome: Progressing     Problem: Safety - Adult  Goal: Free from fall injury  Outcome: Progressing     Problem: Chronic Conditions and Co-morbidities  Goal: Patient's chronic conditions and co-morbidity symptoms are monitored and maintained or improved  Outcome: Progressing  Flowsheets (Taken 6/9/2024 0800)  Care Plan - Patient's Chronic Conditions and Co-Morbidity Symptoms are Monitored and Maintained or Improved: Monitor and assess patient's chronic conditions and comorbid symptoms for stability, deterioration, or improvement     Problem: Neurosensory - Adult  Goal: Achieves stable or improved neurological status  Outcome: Progressing     Problem: Respiratory - Adult  Goal: Achieves optimal ventilation and oxygenation  Outcome: Progressing  Flowsheets (Taken 6/9/2024 0800)  Achieves optimal ventilation and oxygenation: Assess for changes in respiratory status     Problem: Cardiovascular - Adult  Goal: Maintains optimal cardiac output and hemodynamic stability  Outcome: Progressing  Flowsheets (Taken 6/9/2024 0800)  Maintains optimal cardiac output and hemodynamic stability: Monitor blood pressure and heart rate  Goal: Absence of cardiac dysrhythmias or at baseline  Outcome: Progressing  Flowsheets (Taken 6/9/2024 0800)  Absence of cardiac dysrhythmias or at baseline: Monitor cardiac rate and rhythm     Problem: Musculoskeletal - Adult  Goal: Return mobility to safest level of function  Outcome: Progressing  Flowsheets (Taken 6/9/2024 0800)  Return Mobility to Safest Level of Function: Assess patient stability and activity tolerance for standing, transferring and 
at discharge  6/9/2024 0308 by Jose Mejias, RN  Outcome: Progressing  6/8/2024 1833 by Violetta Beal, RN  Outcome: Progressing     Problem: Pain  Goal: Verbalizes/displays adequate comfort level or baseline comfort level  6/9/2024 0308 by Jose Mejias RN  Outcome: Progressing  6/8/2024 1833 by Violetta Beal, RN  Outcome: Progressing     
Completed  Flowsheets (Taken 6/11/2024 0800)  Return Mobility to Safest Level of Function:   Assess patient stability and activity tolerance for standing, transferring and ambulating with or without assistive devices   Assist with transfers and ambulation using safe patient handling equipment as needed   Ensure adequate protection for wounds/incisions during mobilization   Obtain physical therapy/occupational therapy consults as needed     Problem: Infection - Adult  Goal: Absence of infection at discharge  6/11/2024 1521 by Kaitlynn Smith RN  Outcome: Completed  Flowsheets (Taken 6/11/2024 0800)  Absence of infection at discharge:   Assess and monitor for signs and symptoms of infection   Monitor lab/diagnostic results   Monitor all insertion sites i.e., indwelling lines, tubes and drains     Problem: Pain  Goal: Verbalizes/displays adequate comfort level or baseline comfort level  6/11/2024 1521 by Kaitlynn Smith RN  Outcome: Completed  Flowsheets (Taken 6/11/2024 0804)  Verbalizes/displays adequate comfort level or baseline comfort level:   Encourage patient to monitor pain and request assistance   Administer analgesics based on type and severity of pain and evaluate response   Assess pain using appropriate pain scale   Implement non-pharmacological measures as appropriate and evaluate response   Consider cultural and social influences on pain and pain management   Notify Licensed Independent Practitioner if interventions unsuccessful or patient reports new pain     Problem: Skin/Tissue Integrity  Goal: Absence of new skin breakdown  Description: 1.  Monitor for areas of redness and/or skin breakdown  2.  Assess vascular access sites hourly  3.  Every 4-6 hours minimum:  Change oxygen saturation probe site  4.  Every 4-6 hours:  If on nasal continuous positive airway pressure, respiratory therapy assess nares and determine need for appliance change or resting period.  Outcome: Completed

## 2024-06-11 NOTE — DISCHARGE INSTR - DIET

## 2024-06-11 NOTE — CARE COORDINATION
Transitional planning-call from [place] at [facility]-received auth. Notified patient. 1400 [facility]-530 by KANDY. 1510 notified [place] at [facility].[phone] faxed AVS and HENS to [facility]. 1530 notified patient of transport time. Asked about notifying any one else- he said no.

## 2024-06-11 NOTE — DISCHARGE INSTRUCTIONS
Follow up with your primary care physician, in 2-14 days  Take all your medication as prescribed . If your prescription has refills, obtain the medication refills from the pharmacy before you run out. Seek medical attention if you run out of a medication you need and do not have any refills of.   Reasons to call your doctor or go to the ER: Chest pain, shortness of breath, lightheadedness, loss of consciousness, blood in stool or urine, high-grade fevers, or any other concerning symptoms.

## 2024-06-11 NOTE — PROGRESS NOTES
CLINICAL PHARMACY NOTE: MEDS TO BEDS    Total # of Prescriptions Filled: 1   The following medications were delivered to the patient:  ondansetron    Additional Documentation: shaggy starr  
[facility]   Progress Note                   Date:   6/7/2024  Patient name: Mina Gill  Date of admission:  6/6/2024 11:44 AM  MRN:   [id number]  YOB: 1940  PCP: Jamal Barnes MD    Reason for Admission: Atypical chest pain [R07.89]    Subjective:       Clinical Changes / Abnormalities: Pt seen and examined in the room.  Pt denies any CP or sob.  Labs, vitals and tele reviewed-        Medications:   Scheduled Meds:   enoxaparin  40 mg SubCUTAneous Daily    sodium chloride flush  5-40 mL IntraVENous 2 times per day    allopurinol  100 mg Oral Daily    amitriptyline  10 mg Oral Nightly    amLODIPine  5 mg Oral Daily    atorvastatin  40 mg Oral Daily    carbidopa-levodopa  1 tablet Oral 4x Daily    clopidogrel  75 mg Oral Daily    entacapone  200 mg Oral 4x Daily    gabapentin  100 mg Oral Daily    isosorbide mononitrate  30 mg Oral Daily    pantoprazole  40 mg Oral Daily    rOPINIRole  0.5 mg Oral TID    sucralfate  1 g Oral 4x Daily AC & HS    tamsulosin  0.4 mg Oral Daily     Continuous Infusions:   sodium chloride       CBC:   Recent Labs     06/06/24  1158 06/07/24  0427   WBC 6.7 5.4   HGB 15.0 13.2    165     BMP:    Recent Labs     06/06/24  1158 06/07/24  0427   * 134*   K 4.0 4.1    103   CO2 24 22   BUN 19 19   CREATININE 1.5* 1.4*   GLUCOSE 181* 114*     Hepatic:   Recent Labs     06/07/24  0427   AST 32   ALT 9*   BILITOT 0.5   ALKPHOS 86     Troponin:   Recent Labs     06/06/24  1158 06/06/24  1300 06/06/24  1427   TROPHS 37* 38* 40*     BNP: No results for input(s): \"BNP\" in the last 72 hours.  Lipids: No results for input(s): \"CHOL\", \"HDL\" in the last 72 hours.    Invalid input(s): \"LDLCALCU\"  INR:   Recent Labs     06/06/24  1603   INR 1.0     EKG: Sinus bradycardia with first-degree AV block, incomplete RBBB, no acute ST-T changes  ECHO: 02/09/2024    Left Ventricle: Preserved left ventricular systolic function with a visually estimated EF of 50 
[facility] Inpatient Service  [facility]  Progress Note    Date:   6/8/2024  Patient name:  Mina Gill  Date of admission:  6/6/2024 11:44 AM  MRN:   [id number]  YOB: 1940    SUBJECTIVE/Last 24 hours update:     Patient seen and examined at bedside this morning  No acute events overnight  Is vitally stable  Patient states that overnight patient he had an episode where he felt diaphoretic for an hour.  Yesterday patient also felt very weak during physical therapy.  Patient to be discharged to SNF facility, [facility], referral placed      HPI:   The patient is a 83 y.o.  male with history of CAD, stents placed in LAD in 2018, had a cardiac cath in June 2022 as well as an additional 1 done 3 months prior, that showed a patent stent, type 2 diabetes, essential hypertension, CKD stage II, Parkinson disease presenting with left-sided chest pain that has been ongoing for the last 2 to 3 weeks.  States that it is intermittent and can last a couple of days at times.  Described it as a sharp pain that is well localized and does not radiated to his jaw or his shoulders.  Stated that it is worse with exertion specially when taking the stairs.  Currently patient does state that he is having mild chest pain and it is reproducible on palpation.  Denies any shortness, as well as when he takes a deep breath and of breath, nausea, vomiting or any abdominal pain at this time.  In the ED patient was vitally stable, on room air.  Labs were significant for uptrending troponin of 37 and 38 respectively.  Patient still stated that he was having mild chest pain.  Of note patient does have ongoing Parkinson's disease for which she is on multiple medications for it.  He is admitted to the hospital for atypical chest pain.    REVIEW OF SYSTEMS:   Review of Systems   Constitutional:  Negative for chills and fever.   HENT:  Negative for rhinorrhea and sore throat.    Eyes:  Negative for 
Occupational Therapy  Facility/Department: [address] STEPDOWN  Occupational Therapy Daily Treatment Note    Name: Mina Gill  : 1940  MRN: [id number]  Date of Service: 2024    Discharge Recommendations:  Patient would benefit from continued therapy after discharge  OT Equipment Recommendations  ADL Assistive Devices: Transfer Tub Bench;Reacher       Patient Diagnosis(es): The primary encounter diagnosis was Unstable angina pectoris (HCC). A diagnosis of Elevated troponin was also pertinent to this visit.  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  Past Surgical History:  has a past surgical history that includes Colonoscopy (2012); shoulder surgery (Right); pr colsc flx w/rmvl of tumor polyp lesion snare tq (N/A, 2017); Colonoscopy (2017); Cholecystectomy, laparoscopic (N/A, 10/29/2022); Esophagogastroduodenoscopy (2023); Upper gastrointestinal endoscopy (N/A, 2023); Upper gastrointestinal endoscopy (N/A, 2023); and Cardiac procedure (N/A, 2024).    Assessment   Performance deficits / Impairments: Decreased functional mobility ;Decreased ADL status;Decreased strength;Decreased cognition;Decreased safe awareness;Decreased endurance;Decreased balance;Decreased high-level IADLs;Decreased coordination  Assessment: Pt continues to require MIN A for functional mobility and MAX A for LBD and toileting tasks with max cues for safety with use of 
[facility]  PROGRESS NOTE    Shift date: 6/6/24  Shift day: Thursday   Shift # 2    Room # 0504/0504-01   Name: Mina Gill                Sikhism: Mandaen   Place of Temple: unknown    Referral: Routine Visit    Admit Date & Time: 6/6/2024 11:44 AM    Assessment:  Mina Gill is a 83 y.o. male in the hospital. Upon entering the room writer observes patient in bed, eating dinner      Intervention:  Writer introduced self and title as  Pete. Writer offered space for patient  to express feelings, needs, and concerns and provided a ministry presence.    06/06/24 1833   Encounter Summary   Encounter Overview/Reason Initial Encounter   Service Provided For Patient   Referral/Consult From Nursing Supervisor/Manager   Support System Unknown   Last Encounter  06/06/24   Complexity of Encounter Moderate   Begin Time 1800   End Time  1820   Total Time Calculated 20 min   Assessment/Intervention/Outcome   Assessment Calm;Coping   Intervention Active listening;Discussed belief system/Episcopal practices/zoraida;Sustaining Presence/Ministry of presence;Prayer (assurance of)/Datil   Outcome Comfort;Coping;Encouraged;Engaged in conversation         Outcome:  Patient was grateful for the sustaining presence and prayer this writer provided.    Plan:  Chaplains will remain available to offer spiritual and emotional support as needed.      Electronically signed by Chaplain James, on 6/6/2024 at 6:35 PM.  [facility]  [phone]      
Physical Therapy  Facility/Department: [address] STEPDOWN  Physical Therapy Daily Treatment Note    Name: Mina Gill  : 1940  MRN: [id number]  Date of Service: 2024    Discharge Recommendations:  Patient would benefit from continued therapy after discharge   PT Equipment Recommendations  Equipment Needed: No      Patient Diagnosis(es): The primary encounter diagnosis was Unstable angina pectoris (HCC). A diagnosis of Elevated troponin was also pertinent to this visit.  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  Past Surgical History:  has a past surgical history that includes Colonoscopy (2012); shoulder surgery (Right); pr colsc flx w/rmvl of tumor polyp lesion snare tq (N/A, 2017); Colonoscopy (2017); Cholecystectomy, laparoscopic (N/A, 10/29/2022); Esophagogastroduodenoscopy (2023); Upper gastrointestinal endoscopy (N/A, 2023); Upper gastrointestinal endoscopy (N/A, 2023); and Cardiac procedure (N/A, 2024).    Assessment   Body Structures, Functions, Activity Limitations Requiring Skilled Therapeutic Intervention: Decreased functional mobility ;Decreased tolerance to work activity;Decreased strength;Decreased endurance;Decreased balance;Increased pain  Assessment: Pt able to perform transfers with RW and CGA, and ambulated 40ft x2 with RW and Gustavo. Pt most limited by decreased strength and endurance also his parkinsons making him 
Physical Therapy  Facility/Department: [address] STEPDOWN  Physical Therapy Daily Treatment Note    Name: Mina Gill  : 1940  MRN: [id number]  Date of Service: 2024    Discharge Recommendations:  Patient would benefit from continued therapy after discharge   PT Equipment Recommendations  Equipment Needed: No  Other: Pt own cane and walker      Patient Diagnosis(es): The primary encounter diagnosis was Unstable angina pectoris (HCC). A diagnosis of Elevated troponin was also pertinent to this visit.  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  Past Surgical History:  has a past surgical history that includes Colonoscopy (2012); shoulder surgery (Right); pr colsc flx w/rmvl of tumor polyp lesion snare tq (N/A, 2017); Colonoscopy (2017); Cholecystectomy, laparoscopic (N/A, 10/29/2022); Esophagogastroduodenoscopy (2023); Upper gastrointestinal endoscopy (N/A, 2023); Upper gastrointestinal endoscopy (N/A, 2023); and Cardiac procedure (N/A, 2024).    Assessment   Body Structures, Functions, Activity Limitations Requiring Skilled Therapeutic Intervention: Decreased functional mobility ;Decreased tolerance to work activity;Decreased strength;Decreased endurance;Decreased balance;Increased pain  Assessment: Pt able to ambulate ~30 ft X 2 w/RW for support requiring Gustavo, Gustavo completing sup-sit transfer, sit <> stand  Min A, require extra time and 
[facility] Inpatient Service  [facility]  Progress Note    Date:   6/9/2024  Patient name:  Mina Gill  Date of admission:  6/6/2024 11:44 AM  MRN:   [id number]  YOB: 1940    SUBJECTIVE/Last 24 hours update:     Patient seen and examined at bedside.  No complaints this morning.  No acute events overnight, vitally stable.  Patient's reports that he is eating and drinking well, having bowel movements and no difficulty urinating.  Patient did report that he has been working with physical therapy and also ambulating with his nurse.      HPI:     The patient is a 83 y.o.  male with history of CAD, stents placed in LAD in 2018, had a cardiac cath in June 2022 as well as an additional 1 done 3 months prior, that showed a patent stent, type 2 diabetes, essential hypertension, CKD stage II, Parkinson disease presenting with left-sided chest pain that has been ongoing for the last 2 to 3 weeks.  States that it is intermittent and can last a couple of days at times.  Described it as a sharp pain that is well localized and does not radiated to his jaw or his shoulders.  Stated that it is worse with exertion specially when taking the stairs.  Currently patient does state that he is having mild chest pain and it is reproducible on palpation.  Denies any shortness, as well as when he takes a deep breath and of breath, nausea, vomiting or any abdominal pain at this time.  In the ED patient was vitally stable, on room air.  Labs were significant for uptrending troponin of 37 and 38 respectively.  Patient still stated that he was having mild chest pain.  Of note patient does have ongoing Parkinson's disease for which she is on multiple medications for it.  He is admitted to the hospital for atypical chest pain.    REVIEW OF SYSTEMS:   Review of Systems   Constitutional:  Negative for chills, fatigue and fever.   HENT: Negative.     Respiratory: Negative.     Cardiovascular: Negative.  
Report called to [facility].   
assistance  Homemaking Assistance: Needs assistance  Homemaking Responsibilities: No  Ambulation Assistance: Independent  Transfer Assistance: Independent  Active : No  Patient's  Info: friends or family provide transportation, Son has limited availability  Occupation: Retired  Type of Occupation:  , manual labor  Additional Comments: Poor historian, info taken from chart, no family/friends at bedside       Objective            Safety Devices  Type of Devices: All fall risk precautions in place;Patient at risk for falls;Gait belt;Left in bed;Nurse notified (sitting EOB eating breakfast with 2 RN's present)  Restraints  Restraints Initially in Place: No  Bed Mobility Training  Bed Mobility Training: Yes  Supine to Sit: Moderate assistance  Sit to Supine: Other (comment) (Pt retired sitting EOB with RNs present)  Scooting: Supervision  Balance  Sitting:  (Pt sat unsupported on EOB for 22 min at SUP)  Standing:  (Pt stood bedside/func mob at [facility] for 2 min total)  Transfer Training  Transfer Training: Yes  Sit to Stand: Stand-by assistance;Additional time;Adaptive equipment (Pt not not follow vc's for hand placement, demo'd slow/controlled transfers regardless)  Stand to Sit: Adaptive equipment;Stand-by assistance;Additional time  Gait  Gait Training: Yes  Overall Level of Assistance: Contact-guard assistance  Assistive Device: Walker, rolling;Gait belt  Speed/Ewa: Shuffled;Slow (Short func mob at bedside, telemetry left on)     AROM: Generally decreased, functional (B/L shoulder flexion limited to 85 degrees)  PROM: Within functional limits  Strength: Generally decreased, functional (2+/5 at B/L shoulder flexion, 4/5 grossly hands/elbows)  Coordination: Generally decreased, functional (Major resting tremors noted at BUE's at baseline-Parkinsons, when writer would touch a tremoring hand the hand would stop moving for a few seconds and eventually would start again)  Tone: Normal  Sensation: 
JW Salguero NP       ALLERGIES:     Aspirin and Iodine      OBJECTIVE:       Vitals:    06/10/24 0756 06/10/24 2034 06/10/24 2306 06/11/24 0403   BP: (!) 153/91 127/73 (!) 122/95 123/74   Pulse: 61 62 70 51   Resp: 16 15  14   Temp: 98 °F (36.7 °C) 98 °F (36.7 °C) 99.3 °F (37.4 °C) 97.8 °F (36.6 °C)   TempSrc: Oral Oral Oral Oral   SpO2: 98% 96% 96% 97%   Weight:       Height:             Intake/Output Summary (Last 24 hours) at 6/11/2024 0752  Last data filed at 6/10/2024 2200  Gross per 24 hour   Intake 120 ml   Output 1300 ml   Net -1180 ml         PHYSICAL EXAM:  Physical Exam  Vitals reviewed: Patient has parkinsonian tremor..   Constitutional:       Appearance: Normal appearance. He is normal weight.   HENT:      Mouth/Throat:      Mouth: Mucous membranes are moist.      Pharynx: Oropharynx is clear.   Cardiovascular:      Rate and Rhythm: Normal rate and regular rhythm.      Pulses: Normal pulses.      Heart sounds: Normal heart sounds.   Pulmonary:      Effort: Pulmonary effort is normal.      Breath sounds: Normal breath sounds.   Abdominal:      Palpations: Abdomen is soft.      Tenderness: There is no abdominal tenderness.   Musculoskeletal:      Right lower leg: No edema.      Left lower leg: No edema.   Skin:     Capillary Refill: Capillary refill takes less than 2 seconds.   Neurological:      Mental Status: He is alert and oriented to person, place, and time. Mental status is at baseline.         DIAGNOSTICS:     Laboratory Testing:    Recent Results (from the past 24 hour(s))   Basic Metabolic Panel    Collection Time: 06/11/24  6:00 AM   Result Value Ref Range    Sodium 135 (L) 136 - 145 mmol/L    Potassium 4.2 3.7 - 5.3 mmol/L    Chloride 103 98 - 107 mmol/L    CO2 23 20 - 31 mmol/L    Anion Gap 9 9 - 16 mmol/L    Glucose 106 (H) 74 - 99 mg/dL    BUN 17 8 - 23 mg/dL    Creatinine 1.3 (H) 0.70 - 1.20 mg/dL    Est, Glom Filt Rate 53 (L) >60 mL/min/1.73m2    Calcium 9.3 8.6 - 10.4 mg/dL   CBC    
40.7 - 50.3 %    MCV 99.1 82.6 - 102.9 fL    MCH 33.0 25.2 - 33.5 pg    MCHC 33.3 28.4 - 34.8 g/dL    RDW 13.6 11.8 - 14.4 %    Platelets 148 138 - 453 k/uL    MPV 9.2 8.1 - 13.5 fL    NRBC Automated 0.0 0.0 per 100 WBC         Imaging/Diagonstics:    XR CHEST (2 VW)    Result Date: 6/6/2024  EXAMINATION: TWO XRAY VIEWS OF THE CHEST 6/6/2024 12:53 pm COMPARISON: 03/06/2024. HISTORY: ORDERING SYSTEM PROVIDED HISTORY: Chronic CP TECHNOLOGIST PROVIDED HISTORY: Chronic CP FINDINGS: Shallow inflation.  The cardiomediastinal silhouette appears unchanged. Streaky opacities in the lung bases.  There is no consolidation, pneumothorax or evidence for edema. No evidence for effusion.  No acute osseous abnormality is identified.     Shallow inflation with streaky opacities in the lung bases, which may represent subsegmental atelectasis versus developing consolidation.       Current Facility-Administered Medications   Medication Dose Route Frequency Provider Last Rate Last Admin    enoxaparin (LOVENOX) injection 40 mg  40 mg SubCUTAneous Daily Morales, Quratulain, DO   40 mg at 06/10/24 0825    sodium chloride flush 0.9 % injection 5-40 mL  5-40 mL IntraVENous 2 times per day Morales, Quratulain, DO   10 mL at 06/10/24 0759    sodium chloride flush 0.9 % injection 5-40 mL  5-40 mL IntraVENous PRN Morales, Quratulain, DO        0.9 % sodium chloride infusion   IntraVENous PRN Morales, Quratulain, DO        potassium chloride (KLOR-CON M) extended release tablet 40 mEq  40 mEq Oral PRN Morales, Quratulain, DO        Or    potassium bicarb-citric acid (EFFER-K) effervescent tablet 40 mEq  40 mEq Oral PRN Morales, Quratulain, DO        Or    potassium chloride 10 mEq/100 mL IVPB (Peripheral Line)  10 mEq IntraVENous PRN Morales, Quratulain, DO        magnesium sulfate 2000 mg in 50 mL IVPB premix  2,000 mg IntraVENous PRN Morales, Quratulain, DO        ondansetron (ZOFRAN-ODT) disintegrating tablet 4 mg  4 mg Oral Q8H PRN Morales 
Learning: None  Education Outcome: Verbalized understanding;Continued education needed      Therapy Time   Individual Concurrent Group Co-treatment   Time In 1022         Time Out 1123         Minutes 61         Timed Code Treatment Minutes: 40 Minutes       Danielle Bach, PT, DPT         
Anti-XA Unfrac Heparin 0.52 IU/L         Imaging/Diagonstics:  EKG: Sinus bradycardia with 1st degree A-V block  Incomplete right bundle branch block  Left anterior fascicular block  Lateral infarct (cited on or before 09-APR-2023)    XR CHEST (2 VW)    Result Date: 6/6/2024  EXAMINATION: TWO XRAY VIEWS OF THE CHEST 6/6/2024 12:53 pm COMPARISON: 03/06/2024. HISTORY: ORDERING SYSTEM PROVIDED HISTORY: Chronic CP TECHNOLOGIST PROVIDED HISTORY: Chronic CP FINDINGS: Shallow inflation.  The cardiomediastinal silhouette appears unchanged. Streaky opacities in the lung bases.  There is no consolidation, pneumothorax or evidence for edema. No evidence for effusion.  No acute osseous abnormality is identified.     Shallow inflation with streaky opacities in the lung bases, which may represent subsegmental atelectasis versus developing consolidation.       Current Facility-Administered Medications   Medication Dose Route Frequency Provider Last Rate Last Admin    enoxaparin (LOVENOX) injection 40 mg  40 mg SubCUTAneous Daily Morales, Quratulain, DO        sodium chloride flush 0.9 % injection 5-40 mL  5-40 mL IntraVENous 2 times per day Morales, Quratulain, DO   10 mL at 06/06/24 2053    sodium chloride flush 0.9 % injection 5-40 mL  5-40 mL IntraVENous PRN Morales, Quratulain, DO        0.9 % sodium chloride infusion   IntraVENous PRN Morales, Quratulain, DO        potassium chloride (KLOR-CON M) extended release tablet 40 mEq  40 mEq Oral PRN Morales, Quratulain, DO        Or    potassium bicarb-citric acid (EFFER-K) effervescent tablet 40 mEq  40 mEq Oral PRN Morales, Quratulain, DO        Or    potassium chloride 10 mEq/100 mL IVPB (Peripheral Line)  10 mEq IntraVENous PRN Morales, Quratulain, DO        magnesium sulfate 2000 mg in 50 mL IVPB premix  2,000 mg IntraVENous PRN Mroales, Quratulain, DO        ondansetron (ZOFRAN-ODT) disintegrating tablet 4 mg  4 mg Oral Q8H PRN Morales, Quratulain, DO        Or    ondansetron (ZOFRAN)

## 2024-06-12 ENCOUNTER — CARE COORDINATION (OUTPATIENT)
Dept: CARE COORDINATION | Age: 84
End: 2024-06-12

## 2024-06-12 NOTE — CARE COORDINATION
HC received a message from Robin Perry/ARMANI, who stated that the patient has been admitted to a care facility for his health issues.      Plan of Care  HC will sign off of patient at this time

## 2024-06-12 NOTE — DISCHARGE SUMMARY
ondansetron 4 MG disintegrating tablet  Commonly known as: ZOFRAN-ODT  Take 1 tablet by mouth every 8 hours as needed for Nausea or Vomiting            CHANGE how you take these medications      pantoprazole 20 MG tablet  Commonly known as: PROTONIX  TAKE ONE (1) TABLET BY MOUTH ONCE DAILY FOR GERD  What changed:   how much to take  how to take this  when to take this            CONTINUE taking these medications      allopurinol 100 MG tablet  Commonly known as: ZYLOPRIM  TAKE 1 TABLET BY MOUTH ONCE DAILY FOR GOUT     amitriptyline 10 MG tablet  Commonly known as: ELAVIL  TAKE ONE (1) TABLET BY MOUTH AT BEDTIME FOR DEPRESSION     amLODIPine 5 MG tablet  Commonly known as: NORVASC  Take 1 tablet by mouth daily     atorvastatin 40 MG tablet  Commonly known as: LIPITOR  take 1 tablet by mouth once daily     carbidopa-levodopa  MG per tablet  Commonly known as: SINEMET  TAKE ONE (1) TABLET BY MOUTH FOUR (4) TIMES DAILY     clopidogrel 75 MG tablet  Commonly known as: PLAVIX  TAKE 1 TABLET BY MOUTH ONCE DAILY -FOR ANTICOAGULANT     diclofenac sodium 1 % Gel  Commonly known as: VOLTAREN  Apply 4 g topically 4 times daily as needed for Pain     entacapone 200 MG tablet  Commonly known as: COMTAN  TAKE 1 TABLET BY MOUTH 4 TIMES DAILY FOR PARKINSONS     gabapentin 100 MG capsule  Commonly known as: NEURONTIN     isosorbide mononitrate 30 MG extended release tablet  Commonly known as: IMDUR  TAKE ONE (1) TABLET BY MOUTH DAILY     latanoprost 0.005 % ophthalmic solution  Commonly known as: XALATAN     magnesium oxide 400 (240 Mg) MG tablet  Commonly known as: MAG-OX  Take 1 tablet by mouth daily     nitroGLYCERIN 0.4 MG SL tablet  Commonly known as: NITROSTAT  up to max of 3 total doses. If no relief after 1 dose, call 911.     rOPINIRole 0.5 MG tablet  Commonly known as: REQUIP  TAKE ONE (1) TABLET BY MOUTH THREE (3) TIMES DAILY     sucralfate 1 GM tablet  Commonly known as: CARAFATE  TAKE ONE (1) TABLET BY MOUTH

## 2024-06-17 DIAGNOSIS — K20.90 ESOPHAGITIS: ICD-10-CM

## 2024-06-17 DIAGNOSIS — G20.A1 PARKINSON'S DISEASE, UNSPECIFIED WHETHER DYSKINESIA PRESENT, UNSPECIFIED WHETHER MANIFESTATIONS FLUCTUATE (HCC): ICD-10-CM

## 2024-06-17 NOTE — TELEPHONE ENCOUNTER
Last visit: 05/10/2024  Last Med refill: 05/21/2024  Does patient have enough medication for 72 hours: No:     Next Visit Date:  Future Appointments   Date Time Provider Department Center   6/21/2024 10:00 AM Jamal Barnes MD [facility]   10/29/2024  1:00 PM Abraham Cash MD [facility] Neurology -       Health Maintenance   Topic Date Due    Shingles vaccine (1 of 2) Never done    Respiratory Syncytial Virus (RSV) Pregnant or age 60 yrs+ (1 - 1-dose 60+ series) Never done    Colorectal Cancer Screen  09/19/2018    COVID-19 Vaccine (6 - 2023-24 season) 09/01/2023    Hepatitis B vaccine (2 of 3 - 19+ 3-dose series) 12/14/2023    Annual Wellness Visit (Medicare Advantage)  01/01/2024    Lipids  12/15/2024    Depression Monitoring  05/10/2025    DTaP/Tdap/Td vaccine (2 - Td or Tdap) 11/16/2033    Flu vaccine  Completed    Pneumococcal 65+ years Vaccine  Completed    Hepatitis A vaccine  Aged Out    Hib vaccine  Aged Out    Polio vaccine  Aged Out    Meningococcal (ACWY) vaccine  Aged Out       Hemoglobin A1C (%)   Date Value   11/06/2023 6.4 (H)   06/08/2022 6.4 (H)   10/04/2021 6.0             ( goal A1C is < 7)   No components found for: \"LABMICR\"  No components found for: \"LDLCHOLESTEROL\", \"LDLCALC\"    (goal LDL is <100)   AST (U/L)   Date Value   06/07/2024 32     ALT (U/L)   Date Value   06/07/2024 9 (L)     BUN (mg/dL)   Date Value   06/11/2024 17     BP Readings from Last 3 Encounters:   06/11/24 132/74   05/10/24 107/64   05/09/24 121/72          (goal 120/80)    All Future Testing planned in [facility]  Lab Frequency Next Occurrence   Basic Metabolic Panel Once 07/19/2023   CBC with Auto Differential Once 07/19/2023   Magnesium Once 07/19/2023   Phosphorus Once 07/19/2023   Urinalysis with Microscopic Once 07/19/2023   US ABDOMEN LIMITED Once 07/19/2023   NC ARTHROCENTESIS ASPIR&/INJ MAJOR JT/BURSA W/O US Once 01/12/2024   Nuclear stress test with myocardial perfusion Once 02/09/2024   MRI LUMBAR

## 2024-06-18 RX ORDER — SUCRALFATE 1 G/1
TABLET ORAL
Qty: 120 TABLET | Refills: 0 | Status: SHIPPED | OUTPATIENT
Start: 2024-06-18

## 2024-06-18 RX ORDER — ENTACAPONE 200 MG/1
TABLET ORAL
Qty: 360 TABLET | Refills: 0 | Status: SHIPPED | OUTPATIENT
Start: 2024-06-18

## 2024-06-18 RX ORDER — AMITRIPTYLINE HYDROCHLORIDE 10 MG/1
TABLET, FILM COATED ORAL
Qty: 90 TABLET | Refills: 0 | Status: SHIPPED | OUTPATIENT
Start: 2024-06-18

## 2024-06-18 RX ORDER — ALLOPURINOL 100 MG/1
TABLET ORAL
Qty: 90 TABLET | Refills: 0 | Status: SHIPPED | OUTPATIENT
Start: 2024-06-18

## 2024-06-18 RX ORDER — ISOSORBIDE MONONITRATE 30 MG/1
30 TABLET, EXTENDED RELEASE ORAL DAILY
Qty: 90 TABLET | Refills: 0 | Status: SHIPPED | OUTPATIENT
Start: 2024-06-18

## 2024-06-18 RX ORDER — PANTOPRAZOLE SODIUM 20 MG/1
20 TABLET, DELAYED RELEASE ORAL DAILY
Qty: 90 TABLET | Refills: 0 | Status: SHIPPED | OUTPATIENT
Start: 2024-06-18

## 2024-07-01 ENCOUNTER — HOSPITAL ENCOUNTER (OUTPATIENT)
Dept: MRI IMAGING | Age: 84
Discharge: HOME | End: 2024-07-03
Payer: MEDICARE

## 2024-07-01 DIAGNOSIS — M43.16 SPONDYLOLISTHESIS, LUMBAR REGION: ICD-10-CM

## 2024-07-01 DIAGNOSIS — M47.816 LUMBAR FACET ARTHROPATHY: ICD-10-CM

## 2024-07-01 DIAGNOSIS — M47.26 OTHER SPONDYLOSIS WITH RADICULOPATHY, LUMBAR REGION: ICD-10-CM

## 2024-07-01 PROCEDURE — 72148 MRI LUMBAR SPINE W/O DYE: CPT

## 2024-07-22 ENCOUNTER — TELEPHONE (OUTPATIENT)
Dept: FAMILY MEDICINE CLINIC | Age: 84
End: 2024-07-22

## 2024-07-31 ENCOUNTER — CARE COORDINATION (OUTPATIENT)
Dept: CARE COORDINATION | Age: 84
End: 2024-07-31

## 2024-08-03 ENCOUNTER — APPOINTMENT (OUTPATIENT)
Dept: GENERAL RADIOLOGY | Age: 84
DRG: 242 | End: 2024-08-03
Payer: MEDICARE

## 2024-08-03 ENCOUNTER — HOSPITAL ENCOUNTER (INPATIENT)
Age: 84
LOS: 11 days | Discharge: NURSING, REHAB OR LONG-TERM CARE | DRG: 242 | End: 2024-08-14
Attending: EMERGENCY MEDICINE | Admitting: INTERNAL MEDICINE
Payer: MEDICARE

## 2024-08-03 ENCOUNTER — APPOINTMENT (OUTPATIENT)
Dept: ULTRASOUND IMAGING | Age: 84
DRG: 242 | End: 2024-08-03
Payer: MEDICARE

## 2024-08-03 DIAGNOSIS — E87.70 HYPERVOLEMIA, UNSPECIFIED HYPERVOLEMIA TYPE: ICD-10-CM

## 2024-08-03 DIAGNOSIS — N18.9 ACUTE KIDNEY INJURY SUPERIMPOSED ON CKD (HCC): ICD-10-CM

## 2024-08-03 DIAGNOSIS — I44.2 COMPLETE HEART BLOCK (HCC): ICD-10-CM

## 2024-08-03 DIAGNOSIS — J18.9 PNEUMONIA OF RIGHT LOWER LOBE DUE TO INFECTIOUS ORGANISM: ICD-10-CM

## 2024-08-03 DIAGNOSIS — A41.9 SEPTICEMIA (HCC): ICD-10-CM

## 2024-08-03 DIAGNOSIS — I44.0 1ST DEGREE AV BLOCK: ICD-10-CM

## 2024-08-03 DIAGNOSIS — R00.1 BRADYCARDIA: Primary | ICD-10-CM

## 2024-08-03 DIAGNOSIS — N17.9 ACUTE KIDNEY INJURY SUPERIMPOSED ON CKD (HCC): ICD-10-CM

## 2024-08-03 DIAGNOSIS — N17.9 AKI (ACUTE KIDNEY INJURY) (HCC): ICD-10-CM

## 2024-08-03 PROBLEM — J96.01 ACUTE HYPOXIC RESPIRATORY FAILURE (HCC): Status: ACTIVE | Noted: 2024-08-03

## 2024-08-03 PROBLEM — I50.9 ACUTE HEART FAILURE (HCC): Status: ACTIVE | Noted: 2024-08-03

## 2024-08-03 PROBLEM — E87.20 METABOLIC ACIDOSIS: Status: ACTIVE | Noted: 2024-08-03

## 2024-08-03 LAB
ALBUMIN SERPL-MCNC: 4 G/DL (ref 3.5–5.2)
ALBUMIN/GLOB SERPL: 1 {RATIO} (ref 1–2.5)
ALP SERPL-CCNC: 93 U/L (ref 40–129)
ALT SERPL-CCNC: 26 U/L (ref 10–50)
ANION GAP SERPL CALCULATED.3IONS-SCNC: 14 MMOL/L (ref 9–16)
ANION GAP SERPL CALCULATED.3IONS-SCNC: 15 MMOL/L (ref 9–16)
AST SERPL-CCNC: 22 U/L (ref 10–50)
B PARAP IS1001 DNA NPH QL NAA+NON-PROBE: NOT DETECTED
B PERT DNA SPEC QL NAA+PROBE: NOT DETECTED
BASOPHILS # BLD: 0.03 K/UL (ref 0–0.2)
BASOPHILS NFR BLD: 0 % (ref 0–2)
BILIRUB SERPL-MCNC: 1.7 MG/DL (ref 0–1.2)
BNP SERPL-MCNC: ABNORMAL PG/ML (ref 0–300)
BUN BLD-MCNC: 41 MG/DL (ref 8–26)
BUN SERPL-MCNC: 40 MG/DL (ref 8–23)
BUN SERPL-MCNC: 41 MG/DL (ref 8–23)
C PNEUM DNA NPH QL NAA+NON-PROBE: NOT DETECTED
C3 SERPL-MCNC: 119 MG/DL (ref 90–180)
C4 SERPL-MCNC: 21 MG/DL (ref 10–40)
CA-I BLD-SCNC: 1.13 MMOL/L (ref 1.15–1.33)
CALCIUM SERPL-MCNC: 8.7 MG/DL (ref 8.6–10.4)
CALCIUM SERPL-MCNC: 8.9 MG/DL (ref 8.6–10.4)
CHLORIDE BLD-SCNC: 105 MMOL/L (ref 98–107)
CHLORIDE SERPL-SCNC: 100 MMOL/L (ref 98–107)
CHLORIDE SERPL-SCNC: 99 MMOL/L (ref 98–107)
CO2 BLD CALC-SCNC: 19 MMOL/L (ref 22–30)
CO2 SERPL-SCNC: 16 MMOL/L (ref 20–31)
CO2 SERPL-SCNC: 19 MMOL/L (ref 20–31)
CREAT SERPL-MCNC: 2.5 MG/DL (ref 0.7–1.2)
CREAT SERPL-MCNC: 2.6 MG/DL (ref 0.7–1.2)
EGFR, POC: 29 ML/MIN/1.73M2
EOSINOPHIL # BLD: <0.03 K/UL (ref 0–0.44)
EOSINOPHIL,URINE: NORMAL
EOSINOPHILS RELATIVE PERCENT: 0 % (ref 1–4)
ERYTHROCYTE [DISTWIDTH] IN BLOOD BY AUTOMATED COUNT: 13.8 % (ref 11.8–14.4)
FLUAV RNA NPH QL NAA+NON-PROBE: NOT DETECTED
FLUBV RNA NPH QL NAA+NON-PROBE: NOT DETECTED
FREE KAPPA/LAMBDA RATIO: 2.26 (ref 0.22–1.74)
GFR, ESTIMATED: 24 ML/MIN/1.73M2
GFR, ESTIMATED: 25 ML/MIN/1.73M2
GLUCOSE BLD-MCNC: 169 MG/DL (ref 74–100)
GLUCOSE SERPL-MCNC: 174 MG/DL (ref 74–99)
GLUCOSE SERPL-MCNC: 176 MG/DL (ref 74–99)
HADV DNA NPH QL NAA+NON-PROBE: NOT DETECTED
HCO3 VENOUS: 19 MMOL/L (ref 22–29)
HCOV 229E RNA NPH QL NAA+NON-PROBE: NOT DETECTED
HCOV HKU1 RNA NPH QL NAA+NON-PROBE: NOT DETECTED
HCOV NL63 RNA NPH QL NAA+NON-PROBE: NOT DETECTED
HCOV OC43 RNA NPH QL NAA+NON-PROBE: NOT DETECTED
HCT VFR BLD AUTO: 37 % (ref 41–53)
HCT VFR BLD AUTO: 41 % (ref 40.7–50.3)
HGB BLD-MCNC: 13.3 G/DL (ref 13–17)
HMPV RNA NPH QL NAA+NON-PROBE: NOT DETECTED
HPIV1 RNA NPH QL NAA+NON-PROBE: NOT DETECTED
HPIV2 RNA NPH QL NAA+NON-PROBE: NOT DETECTED
HPIV3 RNA NPH QL NAA+NON-PROBE: NOT DETECTED
HPIV4 RNA NPH QL NAA+NON-PROBE: NOT DETECTED
IMM GRANULOCYTES # BLD AUTO: 0.07 K/UL (ref 0–0.3)
IMM GRANULOCYTES NFR BLD: 1 %
INR PPP: 1.2
KAPPA LC FREE SER-MCNC: 45 MG/L
L PNEUMO1 AG UR QL IA.RAPID: NEGATIVE
LACTIC ACID, WHOLE BLOOD: 2 MMOL/L (ref 0.7–2.1)
LACTIC ACID, WHOLE BLOOD: 4.4 MMOL/L (ref 0.7–2.1)
LAMBDA LC FREE SERPL-MCNC: 19.9 MG/L (ref 4.2–27.7)
LIPASE SERPL-CCNC: 11 U/L (ref 13–60)
LYMPHOCYTES NFR BLD: 1.58 K/UL (ref 1.1–3.7)
LYMPHOCYTES RELATIVE PERCENT: 13 % (ref 24–43)
M PNEUMO DNA NPH QL NAA+NON-PROBE: NOT DETECTED
MAGNESIUM SERPL-MCNC: 2.3 MG/DL (ref 1.6–2.4)
MCH RBC QN AUTO: 32.3 PG (ref 25.2–33.5)
MCHC RBC AUTO-ENTMCNC: 32.4 G/DL (ref 28.4–34.8)
MCV RBC AUTO: 99.5 FL (ref 82.6–102.9)
MONOCYTES NFR BLD: 1.02 K/UL (ref 0.1–1.2)
MONOCYTES NFR BLD: 8 % (ref 3–12)
NEGATIVE BASE EXCESS, VEN: 6.3 MMOL/L (ref 0–2)
NEUTROPHILS NFR BLD: 78 % (ref 36–65)
NEUTS SEG NFR BLD: 9.5 K/UL (ref 1.5–8.1)
NRBC BLD-RTO: 0.2 PER 100 WBC
O2 SAT, VEN: 44.6 % (ref 60–85)
PARTIAL THROMBOPLASTIN TIME: 30.1 SEC (ref 23–36.5)
PCO2 VENOUS: 35.9 MM HG (ref 41–51)
PH VENOUS: 7.33 (ref 7.32–7.43)
PLATELET # BLD AUTO: 154 K/UL (ref 138–453)
PMV BLD AUTO: 10.3 FL (ref 8.1–13.5)
PO2 VENOUS: 26.2 MM HG (ref 30–50)
POC ANION GAP: 15 MMOL/L (ref 7–16)
POC CREATININE: 2.2 MG/DL (ref 0.51–1.19)
POC HEMOGLOBIN (CALC): 12.7 G/DL (ref 13.5–17.5)
POC LACTIC ACID: 4.3 MMOL/L (ref 0.56–1.39)
POTASSIUM BLD-SCNC: 4.2 MMOL/L (ref 3.5–4.5)
POTASSIUM SERPL-SCNC: 4.4 MMOL/L (ref 3.7–5.3)
POTASSIUM SERPL-SCNC: 4.9 MMOL/L (ref 3.7–5.3)
PROCALCITONIN SERPL-MCNC: 0.23 NG/ML (ref 0–0.09)
PROT SERPL-MCNC: 6.9 G/DL (ref 6.6–8.7)
PROTHROMBIN TIME: 14.7 SEC (ref 11.7–14.9)
RBC # BLD AUTO: 4.12 M/UL (ref 4.21–5.77)
RSV RNA NPH QL NAA+NON-PROBE: NOT DETECTED
RV+EV RNA NPH QL NAA+NON-PROBE: NOT DETECTED
S PNEUM AG SPEC QL: NEGATIVE
SARS-COV-2 RNA NPH QL NAA+NON-PROBE: NOT DETECTED
SODIUM BLD-SCNC: 138 MMOL/L (ref 138–146)
SODIUM SERPL-SCNC: 131 MMOL/L (ref 136–145)
SODIUM SERPL-SCNC: 132 MMOL/L (ref 136–145)
SPECIMEN DESCRIPTION: NORMAL
SPECIMEN SOURCE: NORMAL
TROPONIN I SERPL HS-MCNC: 55 NG/L (ref 0–22)
TROPONIN I SERPL HS-MCNC: 58 NG/L (ref 0–22)
TSH SERPL DL<=0.05 MIU/L-ACNC: 4.01 UIU/ML (ref 0.27–4.2)
WBC OTHER # BLD: 12.2 K/UL (ref 3.5–11.3)

## 2024-08-03 PROCEDURE — 6360000002 HC RX W HCPCS

## 2024-08-03 PROCEDURE — 93005 ELECTROCARDIOGRAM TRACING: CPT | Performed by: INTERNAL MEDICINE

## 2024-08-03 PROCEDURE — 6360000002 HC RX W HCPCS: Performed by: INTERNAL MEDICINE

## 2024-08-03 PROCEDURE — 87899 AGENT NOS ASSAY W/OPTIC: CPT

## 2024-08-03 PROCEDURE — 2580000003 HC RX 258: Performed by: STUDENT IN AN ORGANIZED HEALTH CARE EDUCATION/TRAINING PROGRAM

## 2024-08-03 PROCEDURE — 99222 1ST HOSP IP/OBS MODERATE 55: CPT | Performed by: PSYCHIATRY & NEUROLOGY

## 2024-08-03 PROCEDURE — 71045 X-RAY EXAM CHEST 1 VIEW: CPT

## 2024-08-03 PROCEDURE — 87449 NOS EACH ORGANISM AG IA: CPT

## 2024-08-03 PROCEDURE — 99223 1ST HOSP IP/OBS HIGH 75: CPT | Performed by: INTERNAL MEDICINE

## 2024-08-03 PROCEDURE — 96365 THER/PROPH/DIAG IV INF INIT: CPT

## 2024-08-03 PROCEDURE — 86738 MYCOPLASMA ANTIBODY: CPT

## 2024-08-03 PROCEDURE — 82565 ASSAY OF CREATININE: CPT

## 2024-08-03 PROCEDURE — 84520 ASSAY OF UREA NITROGEN: CPT

## 2024-08-03 PROCEDURE — 80053 COMPREHEN METABOLIC PANEL: CPT

## 2024-08-03 PROCEDURE — 51702 INSERT TEMP BLADDER CATH: CPT

## 2024-08-03 PROCEDURE — 2580000003 HC RX 258

## 2024-08-03 PROCEDURE — 86038 ANTINUCLEAR ANTIBODIES: CPT

## 2024-08-03 PROCEDURE — 85730 THROMBOPLASTIN TIME PARTIAL: CPT

## 2024-08-03 PROCEDURE — 33210 INSERT ELECTRD/PM CATH SNGL: CPT | Performed by: INTERNAL MEDICINE

## 2024-08-03 PROCEDURE — 82570 ASSAY OF URINE CREATININE: CPT

## 2024-08-03 PROCEDURE — 2500000003 HC RX 250 WO HCPCS

## 2024-08-03 PROCEDURE — 83605 ASSAY OF LACTIC ACID: CPT

## 2024-08-03 PROCEDURE — 84443 ASSAY THYROID STIM HORMONE: CPT

## 2024-08-03 PROCEDURE — 83735 ASSAY OF MAGNESIUM: CPT

## 2024-08-03 PROCEDURE — 84155 ASSAY OF PROTEIN SERUM: CPT

## 2024-08-03 PROCEDURE — 86160 COMPLEMENT ANTIGEN: CPT

## 2024-08-03 PROCEDURE — 86334 IMMUNOFIX E-PHORESIS SERUM: CPT

## 2024-08-03 PROCEDURE — 0202U NFCT DS 22 TRGT SARS-COV-2: CPT

## 2024-08-03 PROCEDURE — 93005 ELECTROCARDIOGRAM TRACING: CPT

## 2024-08-03 PROCEDURE — C1894 INTRO/SHEATH, NON-LASER: HCPCS | Performed by: INTERNAL MEDICINE

## 2024-08-03 PROCEDURE — 84484 ASSAY OF TROPONIN QUANT: CPT

## 2024-08-03 PROCEDURE — 84156 ASSAY OF PROTEIN URINE: CPT

## 2024-08-03 PROCEDURE — 86225 DNA ANTIBODY NATIVE: CPT

## 2024-08-03 PROCEDURE — 94761 N-INVAS EAR/PLS OXIMETRY MLT: CPT

## 2024-08-03 PROCEDURE — 80051 ELECTROLYTE PANEL: CPT

## 2024-08-03 PROCEDURE — 82947 ASSAY GLUCOSE BLOOD QUANT: CPT

## 2024-08-03 PROCEDURE — 99222 1ST HOSP IP/OBS MODERATE 55: CPT | Performed by: INTERNAL MEDICINE

## 2024-08-03 PROCEDURE — 2709999900 HC NON-CHARGEABLE SUPPLY: Performed by: INTERNAL MEDICINE

## 2024-08-03 PROCEDURE — 2500000003 HC RX 250 WO HCPCS: Performed by: INTERNAL MEDICINE

## 2024-08-03 PROCEDURE — 99152 MOD SED SAME PHYS/QHP 5/>YRS: CPT | Performed by: INTERNAL MEDICINE

## 2024-08-03 PROCEDURE — 84145 PROCALCITONIN (PCT): CPT

## 2024-08-03 PROCEDURE — 99291 CRITICAL CARE FIRST HOUR: CPT | Performed by: INTERNAL MEDICINE

## 2024-08-03 PROCEDURE — 2000000000 HC ICU R&B

## 2024-08-03 PROCEDURE — 99285 EMERGENCY DEPT VISIT HI MDM: CPT

## 2024-08-03 PROCEDURE — 87205 SMEAR GRAM STAIN: CPT

## 2024-08-03 PROCEDURE — 80048 BASIC METABOLIC PNL TOTAL CA: CPT

## 2024-08-03 PROCEDURE — 83521 IG LIGHT CHAINS FREE EACH: CPT

## 2024-08-03 PROCEDURE — 96375 TX/PRO/DX INJ NEW DRUG ADDON: CPT

## 2024-08-03 PROCEDURE — 83690 ASSAY OF LIPASE: CPT

## 2024-08-03 PROCEDURE — 82330 ASSAY OF CALCIUM: CPT

## 2024-08-03 PROCEDURE — 76770 US EXAM ABDO BACK WALL COMP: CPT

## 2024-08-03 PROCEDURE — 36415 COLL VENOUS BLD VENIPUNCTURE: CPT

## 2024-08-03 PROCEDURE — 85025 COMPLETE CBC W/AUTO DIFF WBC: CPT

## 2024-08-03 PROCEDURE — 2700000000 HC OXYGEN THERAPY PER DAY

## 2024-08-03 PROCEDURE — 85610 PROTHROMBIN TIME: CPT

## 2024-08-03 PROCEDURE — 33211 INSERT CARD ELECTRODES DUAL: CPT | Performed by: INTERNAL MEDICINE

## 2024-08-03 PROCEDURE — 83880 ASSAY OF NATRIURETIC PEPTIDE: CPT

## 2024-08-03 PROCEDURE — 82803 BLOOD GASES ANY COMBINATION: CPT

## 2024-08-03 PROCEDURE — 87040 BLOOD CULTURE FOR BACTERIA: CPT

## 2024-08-03 PROCEDURE — 6370000000 HC RX 637 (ALT 250 FOR IP)

## 2024-08-03 PROCEDURE — 85014 HEMATOCRIT: CPT

## 2024-08-03 PROCEDURE — 74018 RADEX ABDOMEN 1 VIEW: CPT

## 2024-08-03 PROCEDURE — 84165 PROTEIN E-PHORESIS SERUM: CPT

## 2024-08-03 RX ORDER — METOCLOPRAMIDE HYDROCHLORIDE 5 MG/ML
10 INJECTION INTRAMUSCULAR; INTRAVENOUS ONCE
Status: COMPLETED | OUTPATIENT
Start: 2024-08-03 | End: 2024-08-03

## 2024-08-03 RX ORDER — METRONIDAZOLE 500 MG/100ML
500 INJECTION, SOLUTION INTRAVENOUS EVERY 8 HOURS
Status: DISCONTINUED | OUTPATIENT
Start: 2024-08-03 | End: 2024-08-05

## 2024-08-03 RX ORDER — HEPARIN SODIUM 5000 [USP'U]/ML
5000 INJECTION, SOLUTION INTRAVENOUS; SUBCUTANEOUS EVERY 8 HOURS SCHEDULED
Status: DISCONTINUED | OUTPATIENT
Start: 2024-08-03 | End: 2024-08-14 | Stop reason: HOSPADM

## 2024-08-03 RX ORDER — ENTACAPONE 200 MG/1
200 TABLET ORAL 4 TIMES DAILY
Status: DISCONTINUED | OUTPATIENT
Start: 2024-08-03 | End: 2024-08-14 | Stop reason: HOSPADM

## 2024-08-03 RX ORDER — ONDANSETRON 4 MG/1
4 TABLET, ORALLY DISINTEGRATING ORAL EVERY 8 HOURS PRN
Status: DISCONTINUED | OUTPATIENT
Start: 2024-08-03 | End: 2024-08-14 | Stop reason: HOSPADM

## 2024-08-03 RX ORDER — SODIUM CHLORIDE 0.9 % (FLUSH) 0.9 %
5-40 SYRINGE (ML) INJECTION EVERY 12 HOURS SCHEDULED
Status: DISCONTINUED | OUTPATIENT
Start: 2024-08-03 | End: 2024-08-14 | Stop reason: HOSPADM

## 2024-08-03 RX ORDER — EPINEPHRINE 0.1 MG/ML
INJECTION INTRAVENOUS
Status: DISCONTINUED
Start: 2024-08-03 | End: 2024-08-03 | Stop reason: WASHOUT

## 2024-08-03 RX ORDER — SODIUM CHLORIDE 0.9 % (FLUSH) 0.9 %
5-40 SYRINGE (ML) INJECTION PRN
Status: DISCONTINUED | OUTPATIENT
Start: 2024-08-03 | End: 2024-08-14 | Stop reason: HOSPADM

## 2024-08-03 RX ORDER — POLYETHYLENE GLYCOL 3350 17 G/17G
17 POWDER, FOR SOLUTION ORAL DAILY PRN
Status: DISCONTINUED | OUTPATIENT
Start: 2024-08-03 | End: 2024-08-14 | Stop reason: HOSPADM

## 2024-08-03 RX ORDER — FENTANYL CITRATE 50 UG/ML
25 INJECTION, SOLUTION INTRAMUSCULAR; INTRAVENOUS ONCE
Status: DISCONTINUED | OUTPATIENT
Start: 2024-08-03 | End: 2024-08-03

## 2024-08-03 RX ORDER — ROPINIROLE 0.5 MG/1
0.5 TABLET, FILM COATED ORAL 3 TIMES DAILY
Status: DISCONTINUED | OUTPATIENT
Start: 2024-08-03 | End: 2024-08-14 | Stop reason: HOSPADM

## 2024-08-03 RX ORDER — ATROPINE SULFATE 0.1 MG/ML
INJECTION INTRAVENOUS
Status: DISCONTINUED
Start: 2024-08-03 | End: 2024-08-03 | Stop reason: WASHOUT

## 2024-08-03 RX ORDER — SODIUM CHLORIDE 9 MG/ML
INJECTION, SOLUTION INTRAVENOUS PRN
Status: DISCONTINUED | OUTPATIENT
Start: 2024-08-03 | End: 2024-08-14 | Stop reason: HOSPADM

## 2024-08-03 RX ORDER — ONDANSETRON 2 MG/ML
4 INJECTION INTRAMUSCULAR; INTRAVENOUS EVERY 6 HOURS PRN
Status: DISCONTINUED | OUTPATIENT
Start: 2024-08-03 | End: 2024-08-14 | Stop reason: HOSPADM

## 2024-08-03 RX ORDER — CLOPIDOGREL BISULFATE 75 MG/1
75 TABLET ORAL ONCE
Status: DISCONTINUED | OUTPATIENT
Start: 2024-08-03 | End: 2024-08-03

## 2024-08-03 RX ORDER — SUCRALFATE 1 G/1
1 TABLET ORAL EVERY 6 HOURS SCHEDULED
Status: DISCONTINUED | OUTPATIENT
Start: 2024-08-03 | End: 2024-08-14 | Stop reason: HOSPADM

## 2024-08-03 RX ORDER — FUROSEMIDE 10 MG/ML
40 INJECTION INTRAMUSCULAR; INTRAVENOUS ONCE
Status: COMPLETED | OUTPATIENT
Start: 2024-08-03 | End: 2024-08-03

## 2024-08-03 RX ORDER — GABAPENTIN 100 MG/1
100 CAPSULE ORAL DAILY
Status: DISCONTINUED | OUTPATIENT
Start: 2024-08-03 | End: 2024-08-14 | Stop reason: HOSPADM

## 2024-08-03 RX ORDER — DOPAMINE HYDROCHLORIDE 160 MG/100ML
1-20 INJECTION, SOLUTION INTRAVENOUS CONTINUOUS
Status: DISCONTINUED | OUTPATIENT
Start: 2024-08-03 | End: 2024-08-03

## 2024-08-03 RX ORDER — NITROGLYCERIN 0.4 MG/1
0.4 TABLET SUBLINGUAL EVERY 5 MIN PRN
Status: DISCONTINUED | OUTPATIENT
Start: 2024-08-03 | End: 2024-08-14 | Stop reason: HOSPADM

## 2024-08-03 RX ORDER — FENTANYL CITRATE 50 UG/ML
25 INJECTION, SOLUTION INTRAMUSCULAR; INTRAVENOUS ONCE
Status: COMPLETED | OUTPATIENT
Start: 2024-08-03 | End: 2024-08-03

## 2024-08-03 RX ORDER — ATROPINE SULFATE 0.1 MG/ML
1 INJECTION INTRAVENOUS ONCE
Status: COMPLETED | OUTPATIENT
Start: 2024-08-03 | End: 2024-08-03

## 2024-08-03 RX ORDER — ONDANSETRON 2 MG/ML
4 INJECTION INTRAMUSCULAR; INTRAVENOUS ONCE
Status: DISCONTINUED | OUTPATIENT
Start: 2024-08-03 | End: 2024-08-03

## 2024-08-03 RX ORDER — ONDANSETRON 2 MG/ML
4 INJECTION INTRAMUSCULAR; INTRAVENOUS ONCE
Status: COMPLETED | OUTPATIENT
Start: 2024-08-03 | End: 2024-08-03

## 2024-08-03 RX ORDER — ACETAMINOPHEN 325 MG/1
650 TABLET ORAL EVERY 6 HOURS PRN
Status: DISCONTINUED | OUTPATIENT
Start: 2024-08-03 | End: 2024-08-14 | Stop reason: HOSPADM

## 2024-08-03 RX ORDER — AMITRIPTYLINE HYDROCHLORIDE 10 MG/1
10 TABLET, FILM COATED ORAL NIGHTLY
Status: DISCONTINUED | OUTPATIENT
Start: 2024-08-03 | End: 2024-08-14 | Stop reason: HOSPADM

## 2024-08-03 RX ORDER — MAGNESIUM SULFATE IN WATER 40 MG/ML
2000 INJECTION, SOLUTION INTRAVENOUS ONCE
Status: COMPLETED | OUTPATIENT
Start: 2024-08-03 | End: 2024-08-03

## 2024-08-03 RX ORDER — ACETAMINOPHEN 650 MG/1
650 SUPPOSITORY RECTAL EVERY 6 HOURS PRN
Status: DISCONTINUED | OUTPATIENT
Start: 2024-08-03 | End: 2024-08-14 | Stop reason: HOSPADM

## 2024-08-03 RX ADMIN — FUROSEMIDE 40 MG: 10 INJECTION, SOLUTION INTRAMUSCULAR; INTRAVENOUS at 13:02

## 2024-08-03 RX ADMIN — SODIUM CHLORIDE, PRESERVATIVE FREE 40 MG: 5 INJECTION INTRAVENOUS at 14:31

## 2024-08-03 RX ADMIN — CARBIDOPA AND LEVODOPA 1 TABLET: 25; 100 TABLET ORAL at 14:22

## 2024-08-03 RX ADMIN — ATROPINE SULFATE 1 MG: 0.1 INJECTION INTRAVENOUS at 06:53

## 2024-08-03 RX ADMIN — SODIUM CHLORIDE, PRESERVATIVE FREE 10 ML: 5 INJECTION INTRAVENOUS at 19:57

## 2024-08-03 RX ADMIN — METOCLOPRAMIDE 10 MG: 5 INJECTION, SOLUTION INTRAMUSCULAR; INTRAVENOUS at 08:26

## 2024-08-03 RX ADMIN — ROPINIROLE HYDROCHLORIDE 0.5 MG: 0.5 TABLET, FILM COATED ORAL at 20:45

## 2024-08-03 RX ADMIN — HEPARIN SODIUM 5000 UNITS: 5000 INJECTION INTRAVENOUS; SUBCUTANEOUS at 22:46

## 2024-08-03 RX ADMIN — CEFTRIAXONE SODIUM 1000 MG: 1 INJECTION, POWDER, FOR SOLUTION INTRAMUSCULAR; INTRAVENOUS at 07:44

## 2024-08-03 RX ADMIN — AMITRIPTYLINE HYDROCHLORIDE 10 MG: 10 TABLET, FILM COATED ORAL at 20:45

## 2024-08-03 RX ADMIN — ONDANSETRON 4 MG: 2 INJECTION INTRAMUSCULAR; INTRAVENOUS at 06:51

## 2024-08-03 RX ADMIN — SODIUM CHLORIDE: 9 INJECTION, SOLUTION INTRAVENOUS at 17:00

## 2024-08-03 RX ADMIN — FAMOTIDINE 20 MG: 10 INJECTION, SOLUTION INTRAVENOUS at 06:51

## 2024-08-03 RX ADMIN — ROPINIROLE HYDROCHLORIDE 0.5 MG: 0.5 TABLET, FILM COATED ORAL at 14:35

## 2024-08-03 RX ADMIN — MAGNESIUM SULFATE HEPTAHYDRATE 2000 MG: 40 INJECTION, SOLUTION INTRAVENOUS at 06:38

## 2024-08-03 RX ADMIN — GABAPENTIN 100 MG: 100 CAPSULE ORAL at 14:32

## 2024-08-03 RX ADMIN — ENTACAPONE 200 MG: 200 TABLET, FILM COATED ORAL at 17:01

## 2024-08-03 RX ADMIN — ENTACAPONE 200 MG: 200 TABLET, FILM COATED ORAL at 14:22

## 2024-08-03 RX ADMIN — CARBIDOPA AND LEVODOPA 1 TABLET: 25; 100 TABLET ORAL at 17:02

## 2024-08-03 RX ADMIN — DOPAMINE HYDROCHLORIDE 5 MCG/KG/MIN: 160 INJECTION, SOLUTION INTRAVENOUS at 07:02

## 2024-08-03 RX ADMIN — SUCRALFATE 1 G: 1 TABLET ORAL at 14:27

## 2024-08-03 RX ADMIN — METRONIDAZOLE 500 MG: 500 INJECTION, SOLUTION INTRAVENOUS at 12:11

## 2024-08-03 RX ADMIN — ACETAMINOPHEN 650 MG: 325 TABLET ORAL at 14:32

## 2024-08-03 RX ADMIN — AZITHROMYCIN MONOHYDRATE 500 MG: 500 INJECTION, POWDER, LYOPHILIZED, FOR SOLUTION INTRAVENOUS at 09:06

## 2024-08-03 RX ADMIN — METRONIDAZOLE 500 MG: 500 INJECTION, SOLUTION INTRAVENOUS at 19:54

## 2024-08-03 RX ADMIN — CARBIDOPA AND LEVODOPA 1 TABLET: 25; 100 TABLET ORAL at 20:45

## 2024-08-03 RX ADMIN — SUCRALFATE 1 G: 1 TABLET ORAL at 17:01

## 2024-08-03 RX ADMIN — SODIUM CHLORIDE, PRESERVATIVE FREE 10 ML: 5 INJECTION INTRAVENOUS at 14:32

## 2024-08-03 RX ADMIN — ENTACAPONE 200 MG: 200 TABLET, FILM COATED ORAL at 20:45

## 2024-08-03 RX ADMIN — FENTANYL CITRATE 25 MCG: 50 INJECTION, SOLUTION INTRAMUSCULAR; INTRAVENOUS at 13:08

## 2024-08-03 ASSESSMENT — PAIN SCALES - GENERAL
PAINLEVEL_OUTOF10: 3
PAINLEVEL_OUTOF10: 10

## 2024-08-03 ASSESSMENT — PAIN DESCRIPTION - PAIN TYPE: TYPE: CHRONIC PAIN

## 2024-08-03 ASSESSMENT — PAIN DESCRIPTION - LOCATION: LOCATION: BACK

## 2024-08-03 NOTE — ED NOTES
Patient transported on [employer] via stretcher by  and Brenda cath lab RN from ED 07 to cath lab

## 2024-08-03 NOTE — CARE COORDINATION
Case Management Assessment  Initial Evaluation    Date/Time of Evaluation: 8/3/2024 6:19 PM  Assessment Completed by: Adelaida Stone RN    If patient is discharged prior to next notation, then this note serves as note for discharge by case management.    Patient Name: Mina Gill                   YOB: 1940  Diagnosis: Complete heart block (HCC) [I44.2]  Bradycardia [R00.1]  Septicemia (HCC) [A41.9]  TAMARA (acute kidney injury) (HCC) [N17.9]  1st degree AV block [I44.0]  Pneumonia of right lower lobe due to infectious organism [J18.9]  Hypervolemia, unspecified hypervolemia type [E87.70]                   Date / Time: 8/3/2024  6:27 AM    Patient Admission Status: Inpatient   Readmission Risk (Low < 19, Mod (19-27), High > 27): Readmission Risk Score: 19.4    Current PCP: Tisha Glasgow MD  PCP verified by CM? (P) Yes    Chart Reviewed: Yes      History Provided by: (P) Patient  Patient Orientation: (P) Alert and Oriented    Patient Cognition: (P) Alert    Hospitalization in the last 30 days (Readmission):  No    If yes, Readmission Assessment in CM Navigator will be completed.    Advance Directives:      Code Status: Full Code   Patient's Primary Decision Maker is: (P) Legal Next of Kin    Primary Decision Maker: Jhony Gill - Child - [phone]    Primary Decision Maker: Praneeth Gill - Child - [phone]    Primary Decision Maker: Ivone Gill - Child - [phone]    Discharge Planning:    Patient lives with: (P) Friends Type of Home: (P) House  Primary Care Giver: (P) Friend  Patient Support Systems include: (P) Friends/Neighbors   Current Financial resources: (P) Medicare  Current community resources:    Current services prior to admission: (P) Durable Medical Equipment, Home Care            Current DME: (P) Bedside Commode, Cane, Hospital Bed, Shower Chair, Walker, Wheelchair            Type of Home Care services:  (P) OT, PT, Nursing Services    ADLS  Prior functional level: (P)

## 2024-08-03 NOTE — ED NOTES
Repeat EKG completed and sent to cardiology fellow by Dr. Bashir per verbal order from cardiology resident. Reviewed and signed by Dr. Merlos.

## 2024-08-03 NOTE — ED NOTES
Writer spoke with Brayden from cath lab, who states they should be here to take the patient to the cath lab in 10-20 minutes. Patient updated.

## 2024-08-03 NOTE — ED NOTES
Arrived patient to ED escorted by EMS presents with chest pain and shortness of breath, patient was given aspirin tab prior to arrival as per EMS. Patient states chest pain has been going on for few days accompanied with shortness of breath, painscale of 10/10. Patient appears to be bradycardic at  40bpm, tremors noted on upper extremities. has a history of CKD, heart failure and parkinson's disease, hooked to cardiac monitor, EKG done and reviewed by Dr. Patterson, bloods collected and send to lab for investigation,alert and oriented, able to follow commands. bed on lowest position. Call light provided.

## 2024-08-03 NOTE — H&P
Normal appearance. He is ill-appearing.   Cardiovascular:      Rate and Rhythm: Bradycardia present.      Pulses: Normal pulses.      Heart sounds: No murmur heard.     Comments: Complete heart block on monitor   Pulmonary:      Effort: Pulmonary effort is normal. No respiratory distress.      Breath sounds: Rhonchi present. No wheezing.   Abdominal:      General: Bowel sounds are normal. There is no distension.      Palpations: There is no mass.      Tenderness: There is no abdominal tenderness.   Musculoskeletal:      Right lower leg: No edema.      Left lower leg: No edema.   Skin:     Capillary Refill: Capillary refill takes less than 2 seconds.   Neurological:      General: No focal deficit present.      Mental Status: He is alert.      Sensory: No sensory deficit.      Motor: No weakness.      Comments: Baseline slurred speech secondary to parkinson's. Tremor noted on bilateral hands at rest. Some rigidity of arms.         Laboratory findings:-    CBC:   Recent Labs     08/03/24  0649   WBC 12.2*   HGB 13.3        BMP:    Recent Labs     08/03/24  0646 08/03/24  0649   NA  --  132*   K  --  4.4   CL  --  99   CO2  --  19*   BUN  --  40*   CREATININE 2.2* 2.5*   GLUCOSE  --  174*     S. Calcium:  Recent Labs     08/03/24  0649   CALCIUM 8.9     S. Ionized Calcium:Invalid input(s): \"IONCA\"  S. Magnesium:  Recent Labs     08/03/24  0649   MG 2.3     S. Phosphorus:No results for input(s): \"PHOS\" in the last 72 hours.  S. Glucose:  Recent Labs     08/03/24  0646   POCGLU 169*     Glycosylated hemoglobin A1C: No results for input(s): \"LABA1C\" in the last 72 hours.  INR: No results for input(s): \"INR\" in the last 72 hours.  Hepatic functions:   Recent Labs     08/03/24  0649   ALKPHOS 93   ALT 26   AST 22   BILITOT 1.7*     Pancreatic functions:No results for input(s): \"LACTA\", \"AMYLASE\" in the last 72 hours.  S. Lactic Acid: No results for input(s): \"LACTA\" in the last 72 hours.  Cardiac enzymes:No results

## 2024-08-03 NOTE — PROCEDURES
Date:   8/3/2024  Patient name: Mina Gill  Date of admission:  8/3/2024  6:27 AM  MRN:   [id number]  YOB: 1940    Temporary Pacemaker Placement     Operators:  Primary: Guerita Jeffrey MD  Assistant: Bel Haynes MD.     Indications for temporary pacemaker placement: Complete heart block       Procedure performed: Temporary Pacemaker Placement     Access: Right IJ Vein    Procedure:  After informed consent was obtained with explanation of the risks and benefits, the Right femoral was prepped and draped in sterile fashion. 1% lidocaine was used for local block.  A 5 Fr catheter was inserted under ultrasound guidance, a  bipolar pacing catheter was then  advanced into the Cordis. The catheter was advanced to approximately 15  centimeters whereupon the balloon was inflated. It was further advanced into the right atrium and then the right ventricle to a depth of 60 cm at which point pacing was achieved. The balloon was deflated. Capture was reachieved at 3 mAmp. The patient tolerated the procedure well. The pacing threshold was set at 60 bpm.      Complications: None  Estimated blood loss less than 5 ml     Ciera Gurrola MD, MD  Fellow, Cardiovascular Diseases    [facility]          Physician Statement  I have discussed the case of Mina Gill including pertinent history and exam findings with the student/resident/fellow. I have seen and examined the patient and the key elements of the encounter have been performed by me. I agree with the assessment, plan and orders as documented by the resident With changes made to the note.   Procedure performed by me.    Electronically signed by Guerita Jeffrey MD on 8/3/2024 at 3:57 PM.    [facility]      [phone]

## 2024-08-04 ENCOUNTER — APPOINTMENT (OUTPATIENT)
Dept: GENERAL RADIOLOGY | Age: 84
DRG: 242 | End: 2024-08-04
Payer: MEDICARE

## 2024-08-04 LAB
ALBUMIN SERPL-MCNC: 3.4 G/DL (ref 3.5–5.2)
ALBUMIN/GLOB SERPL: 1 {RATIO} (ref 1–2.5)
ALP SERPL-CCNC: 81 U/L (ref 40–129)
ALT SERPL-CCNC: 12 U/L (ref 10–50)
ANION GAP SERPL CALCULATED.3IONS-SCNC: 13 MMOL/L (ref 9–16)
AST SERPL-CCNC: 17 U/L (ref 10–50)
BACTERIA URNS QL MICRO: ABNORMAL
BASOPHILS # BLD: 0.05 K/UL (ref 0–0.2)
BASOPHILS NFR BLD: 0 % (ref 0–2)
BILIRUB DIRECT SERPL-MCNC: 0.7 MG/DL (ref 0–0.2)
BILIRUB SERPL-MCNC: 1.6 MG/DL (ref 0–1.2)
BILIRUB UR QL STRIP: NEGATIVE
BUN SERPL-MCNC: 39 MG/DL (ref 8–23)
CALCIUM SERPL-MCNC: 8.9 MG/DL (ref 8.6–10.4)
CASTS #/AREA URNS LPF: ABNORMAL /LPF (ref 0–8)
CHLORIDE SERPL-SCNC: 102 MMOL/L (ref 98–107)
CLARITY UR: CLEAR
CO2 SERPL-SCNC: 19 MMOL/L (ref 20–31)
COLOR UR: YELLOW
CREAT SERPL-MCNC: 2.1 MG/DL (ref 0.7–1.2)
CREAT UR-MCNC: 228 MG/DL (ref 39–259)
EKG ATRIAL RATE: 60 BPM
EKG Q-T INTERVAL: 486 MS
EKG QRS DURATION: 164 MS
EKG QTC CALCULATION (BAZETT): 486 MS
EKG R AXIS: -65 DEGREES
EKG T AXIS: 101 DEGREES
EKG VENTRICULAR RATE: 60 BPM
EOSINOPHIL # BLD: <0.03 K/UL (ref 0–0.44)
EOSINOPHILS RELATIVE PERCENT: 0 % (ref 1–4)
EPI CELLS #/AREA URNS HPF: ABNORMAL /HPF (ref 0–5)
ERYTHROCYTE [DISTWIDTH] IN BLOOD BY AUTOMATED COUNT: 13.9 % (ref 11.8–14.4)
GFR, ESTIMATED: 31 ML/MIN/1.73M2
GLUCOSE SERPL-MCNC: 141 MG/DL (ref 74–99)
GLUCOSE UR STRIP-MCNC: NEGATIVE MG/DL
HCT VFR BLD AUTO: 40.1 % (ref 40.7–50.3)
HGB BLD-MCNC: 13.3 G/DL (ref 13–17)
HGB UR QL STRIP.AUTO: ABNORMAL
IMM GRANULOCYTES # BLD AUTO: 0.07 K/UL (ref 0–0.3)
IMM GRANULOCYTES NFR BLD: 1 %
KETONES UR STRIP-MCNC: NEGATIVE MG/DL
LEUKOCYTE ESTERASE UR QL STRIP: ABNORMAL
LYMPHOCYTES NFR BLD: 1.15 K/UL (ref 1.1–3.7)
LYMPHOCYTES RELATIVE PERCENT: 9 % (ref 24–43)
MCH RBC QN AUTO: 33.3 PG (ref 25.2–33.5)
MCHC RBC AUTO-ENTMCNC: 33.2 G/DL (ref 28.4–34.8)
MCV RBC AUTO: 100.5 FL (ref 82.6–102.9)
MONOCYTES NFR BLD: 1.15 K/UL (ref 0.1–1.2)
MONOCYTES NFR BLD: 9 % (ref 3–12)
NEUTROPHILS NFR BLD: 81 % (ref 36–65)
NEUTS SEG NFR BLD: 10.66 K/UL (ref 1.5–8.1)
NITRITE UR QL STRIP: NEGATIVE
NRBC BLD-RTO: 0.3 PER 100 WBC
PH UR STRIP: 5.5 [PH] (ref 5–8)
PLATELET # BLD AUTO: 141 K/UL (ref 138–453)
PMV BLD AUTO: 10.4 FL (ref 8.1–13.5)
POTASSIUM SERPL-SCNC: 4.5 MMOL/L (ref 3.7–5.3)
PROT SERPL-MCNC: 6.5 G/DL (ref 6.6–8.7)
PROT UR STRIP-MCNC: NEGATIVE MG/DL
RBC # BLD AUTO: 3.99 M/UL (ref 4.21–5.77)
RBC #/AREA URNS HPF: ABNORMAL /HPF (ref 0–4)
SODIUM SERPL-SCNC: 134 MMOL/L (ref 136–145)
SP GR UR STRIP: 1.01 (ref 1–1.03)
TOTAL PROTEIN, URINE: 41 MG/DL
TROPONIN I SERPL HS-MCNC: 45 NG/L (ref 0–22)
TROPONIN I SERPL HS-MCNC: 46 NG/L (ref 0–22)
TROPONIN I SERPL HS-MCNC: 51 NG/L (ref 0–22)
UROBILINOGEN UR STRIP-ACNC: NORMAL EU/DL (ref 0–1)
WBC #/AREA URNS HPF: ABNORMAL /HPF (ref 0–5)
WBC OTHER # BLD: 13.1 K/UL (ref 3.5–11.3)

## 2024-08-04 PROCEDURE — 36415 COLL VENOUS BLD VENIPUNCTURE: CPT

## 2024-08-04 PROCEDURE — 2000000000 HC ICU R&B

## 2024-08-04 PROCEDURE — 81001 URINALYSIS AUTO W/SCOPE: CPT

## 2024-08-04 PROCEDURE — 2580000003 HC RX 258

## 2024-08-04 PROCEDURE — 87086 URINE CULTURE/COLONY COUNT: CPT

## 2024-08-04 PROCEDURE — 99232 SBSQ HOSP IP/OBS MODERATE 35: CPT | Performed by: PSYCHIATRY & NEUROLOGY

## 2024-08-04 PROCEDURE — 94761 N-INVAS EAR/PLS OXIMETRY MLT: CPT

## 2024-08-04 PROCEDURE — 2700000000 HC OXYGEN THERAPY PER DAY

## 2024-08-04 PROCEDURE — 6360000002 HC RX W HCPCS: Performed by: NURSE PRACTITIONER

## 2024-08-04 PROCEDURE — 99233 SBSQ HOSP IP/OBS HIGH 50: CPT | Performed by: INTERNAL MEDICINE

## 2024-08-04 PROCEDURE — 6360000002 HC RX W HCPCS

## 2024-08-04 PROCEDURE — 82248 BILIRUBIN DIRECT: CPT

## 2024-08-04 PROCEDURE — 2580000003 HC RX 258: Performed by: STUDENT IN AN ORGANIZED HEALTH CARE EDUCATION/TRAINING PROGRAM

## 2024-08-04 PROCEDURE — 6370000000 HC RX 637 (ALT 250 FOR IP)

## 2024-08-04 PROCEDURE — 84484 ASSAY OF TROPONIN QUANT: CPT

## 2024-08-04 PROCEDURE — 74018 RADEX ABDOMEN 1 VIEW: CPT

## 2024-08-04 PROCEDURE — 71045 X-RAY EXAM CHEST 1 VIEW: CPT

## 2024-08-04 PROCEDURE — 93005 ELECTROCARDIOGRAM TRACING: CPT | Performed by: EMERGENCY MEDICINE

## 2024-08-04 PROCEDURE — 6370000000 HC RX 637 (ALT 250 FOR IP): Performed by: PSYCHIATRY & NEUROLOGY

## 2024-08-04 PROCEDURE — 80053 COMPREHEN METABOLIC PANEL: CPT

## 2024-08-04 PROCEDURE — 99232 SBSQ HOSP IP/OBS MODERATE 35: CPT | Performed by: INTERNAL MEDICINE

## 2024-08-04 PROCEDURE — 85025 COMPLETE CBC W/AUTO DIFF WBC: CPT

## 2024-08-04 PROCEDURE — 99291 CRITICAL CARE FIRST HOUR: CPT | Performed by: INTERNAL MEDICINE

## 2024-08-04 PROCEDURE — 6360000002 HC RX W HCPCS: Performed by: EMERGENCY MEDICINE

## 2024-08-04 RX ORDER — CLONAZEPAM 0.5 MG/1
0.5 TABLET ORAL NIGHTLY
Status: COMPLETED | OUTPATIENT
Start: 2024-08-04 | End: 2024-08-04

## 2024-08-04 RX ORDER — FUROSEMIDE 10 MG/ML
40 INJECTION INTRAMUSCULAR; INTRAVENOUS ONCE
Status: COMPLETED | OUTPATIENT
Start: 2024-08-04 | End: 2024-08-04

## 2024-08-04 RX ORDER — LORAZEPAM 2 MG/ML
0.5 INJECTION INTRAMUSCULAR ONCE
Status: COMPLETED | OUTPATIENT
Start: 2024-08-04 | End: 2024-08-04

## 2024-08-04 RX ORDER — LANOLIN ALCOHOL/MO/W.PET/CERES
3 CREAM (GRAM) TOPICAL NIGHTLY
Status: DISCONTINUED | OUTPATIENT
Start: 2024-08-04 | End: 2024-08-14 | Stop reason: HOSPADM

## 2024-08-04 RX ORDER — FENTANYL CITRATE 50 UG/ML
50 INJECTION, SOLUTION INTRAMUSCULAR; INTRAVENOUS ONCE
Status: COMPLETED | OUTPATIENT
Start: 2024-08-04 | End: 2024-08-04

## 2024-08-04 RX ADMIN — CARBIDOPA AND LEVODOPA 1 TABLET: 25; 100 TABLET ORAL at 12:30

## 2024-08-04 RX ADMIN — ROPINIROLE HYDROCHLORIDE 0.5 MG: 0.5 TABLET, FILM COATED ORAL at 21:17

## 2024-08-04 RX ADMIN — METRONIDAZOLE 500 MG: 500 INJECTION, SOLUTION INTRAVENOUS at 17:40

## 2024-08-04 RX ADMIN — CLONAZEPAM 0.5 MG: 0.5 TABLET ORAL at 21:18

## 2024-08-04 RX ADMIN — SUCRALFATE 1 G: 1 TABLET ORAL at 00:35

## 2024-08-04 RX ADMIN — SUCRALFATE 1 G: 1 TABLET ORAL at 12:30

## 2024-08-04 RX ADMIN — ACETAMINOPHEN 650 MG: 325 TABLET ORAL at 09:10

## 2024-08-04 RX ADMIN — ENTACAPONE 200 MG: 200 TABLET, FILM COATED ORAL at 21:18

## 2024-08-04 RX ADMIN — SODIUM CHLORIDE, PRESERVATIVE FREE 40 MG: 5 INJECTION INTRAVENOUS at 09:02

## 2024-08-04 RX ADMIN — SODIUM CHLORIDE, PRESERVATIVE FREE 10 ML: 5 INJECTION INTRAVENOUS at 21:19

## 2024-08-04 RX ADMIN — CARBIDOPA AND LEVODOPA 1 TABLET: 25; 100 TABLET ORAL at 21:18

## 2024-08-04 RX ADMIN — SUCRALFATE 1 G: 1 TABLET ORAL at 17:42

## 2024-08-04 RX ADMIN — FUROSEMIDE 40 MG: 10 INJECTION, SOLUTION INTRAMUSCULAR; INTRAVENOUS at 11:12

## 2024-08-04 RX ADMIN — AZITHROMYCIN DIHYDRATE 500 MG: 500 INJECTION, POWDER, LYOPHILIZED, FOR SOLUTION INTRAVENOUS at 09:34

## 2024-08-04 RX ADMIN — SODIUM CHLORIDE: 9 INJECTION, SOLUTION INTRAVENOUS at 09:31

## 2024-08-04 RX ADMIN — ROPINIROLE HYDROCHLORIDE 0.5 MG: 0.5 TABLET, FILM COATED ORAL at 14:09

## 2024-08-04 RX ADMIN — ACETAMINOPHEN 650 MG: 325 TABLET ORAL at 00:37

## 2024-08-04 RX ADMIN — HEPARIN SODIUM 5000 UNITS: 5000 INJECTION INTRAVENOUS; SUBCUTANEOUS at 14:09

## 2024-08-04 RX ADMIN — CARBIDOPA AND LEVODOPA 1 TABLET: 25; 100 TABLET ORAL at 09:05

## 2024-08-04 RX ADMIN — GABAPENTIN 100 MG: 100 CAPSULE ORAL at 09:02

## 2024-08-04 RX ADMIN — ENTACAPONE 200 MG: 200 TABLET, FILM COATED ORAL at 12:30

## 2024-08-04 RX ADMIN — SODIUM CHLORIDE, PRESERVATIVE FREE 10 ML: 5 INJECTION INTRAVENOUS at 09:03

## 2024-08-04 RX ADMIN — SUCRALFATE 1 G: 1 TABLET ORAL at 09:01

## 2024-08-04 RX ADMIN — ENTACAPONE 200 MG: 200 TABLET, FILM COATED ORAL at 09:04

## 2024-08-04 RX ADMIN — HEPARIN SODIUM 5000 UNITS: 5000 INJECTION INTRAVENOUS; SUBCUTANEOUS at 21:30

## 2024-08-04 RX ADMIN — FENTANYL CITRATE 50 MCG: 50 INJECTION, SOLUTION INTRAMUSCULAR; INTRAVENOUS at 05:02

## 2024-08-04 RX ADMIN — HEPARIN SODIUM 5000 UNITS: 5000 INJECTION INTRAVENOUS; SUBCUTANEOUS at 05:36

## 2024-08-04 RX ADMIN — LORAZEPAM 0.5 MG: 2 INJECTION INTRAMUSCULAR; INTRAVENOUS at 01:26

## 2024-08-04 RX ADMIN — ROPINIROLE HYDROCHLORIDE 0.5 MG: 0.5 TABLET, FILM COATED ORAL at 09:02

## 2024-08-04 RX ADMIN — CARBIDOPA AND LEVODOPA 1 TABLET: 25; 100 TABLET ORAL at 17:30

## 2024-08-04 RX ADMIN — METRONIDAZOLE 500 MG: 500 INJECTION, SOLUTION INTRAVENOUS at 09:00

## 2024-08-04 RX ADMIN — ENTACAPONE 200 MG: 200 TABLET, FILM COATED ORAL at 17:30

## 2024-08-04 RX ADMIN — AMITRIPTYLINE HYDROCHLORIDE 10 MG: 10 TABLET, FILM COATED ORAL at 21:17

## 2024-08-04 RX ADMIN — Medication 1000 MG: at 11:16

## 2024-08-04 RX ADMIN — Medication 3 MG: at 21:18

## 2024-08-04 RX ADMIN — SODIUM CHLORIDE, PRESERVATIVE FREE 10 ML: 5 INJECTION INTRAVENOUS at 09:04

## 2024-08-05 ENCOUNTER — APPOINTMENT (OUTPATIENT)
Age: 84
DRG: 242 | End: 2024-08-05
Payer: MEDICARE

## 2024-08-05 PROBLEM — I50.82 BIVENTRICULAR CONGESTIVE HEART FAILURE (HCC): Status: ACTIVE | Noted: 2024-08-05

## 2024-08-05 PROBLEM — I44.2 HEART BLOCK AV COMPLETE (HCC): Status: ACTIVE | Noted: 2024-08-05

## 2024-08-05 LAB
ALBUMIN SERPL-MCNC: 3.1 G/DL (ref 3.5–5.2)
ALBUMIN/GLOB SERPL: 1 {RATIO} (ref 1–2.5)
ALP SERPL-CCNC: 83 U/L (ref 40–129)
ALT SERPL-CCNC: 8 U/L (ref 10–50)
ANA SER QL IA: NEGATIVE
ANION GAP SERPL CALCULATED.3IONS-SCNC: 12 MMOL/L (ref 9–16)
AST SERPL-CCNC: 20 U/L (ref 10–50)
BASOPHILS # BLD: <0.03 K/UL (ref 0–0.2)
BASOPHILS NFR BLD: 0 % (ref 0–2)
BILIRUB SERPL-MCNC: 0.6 MG/DL (ref 0–1.2)
BUN SERPL-MCNC: 38 MG/DL (ref 8–23)
CALCIUM SERPL-MCNC: 8.5 MG/DL (ref 8.6–10.4)
CHLORIDE SERPL-SCNC: 103 MMOL/L (ref 98–107)
CO2 SERPL-SCNC: 20 MMOL/L (ref 20–31)
CREAT SERPL-MCNC: 1.7 MG/DL (ref 0.7–1.2)
CRP SERPL HS-MCNC: 254 MG/L (ref 0–5)
DSDNA IGG SER QL IA: 0.7 IU/ML
ECHO AO ROOT DIAM: 3 CM
ECHO AO ROOT INDEX: 1.51 CM/M2
ECHO AV AREA PEAK VELOCITY: 2.2 CM2
ECHO AV AREA VTI: 2.4 CM2
ECHO AV AREA/BSA PEAK VELOCITY: 1.1 CM2/M2
ECHO AV AREA/BSA VTI: 1.2 CM2/M2
ECHO AV MEAN GRADIENT: 4 MMHG
ECHO AV MEAN VELOCITY: 0.9 M/S
ECHO AV PEAK GRADIENT: 8 MMHG
ECHO AV PEAK VELOCITY: 1.4 M/S
ECHO AV VELOCITY RATIO: 0.79
ECHO AV VTI: 27.6 CM
ECHO BSA: 2.07 M2
ECHO EST RA PRESSURE: 3 MMHG
ECHO IVC PROX: 1.8 CM
ECHO LA AREA 2C: 18.2 CM2
ECHO LA AREA 4C: 17.4 CM2
ECHO LA DIAMETER INDEX: 1.81 CM/M2
ECHO LA DIAMETER: 3.6 CM
ECHO LA MAJOR AXIS: 5.6 CM
ECHO LA MINOR AXIS: 4.9 CM
ECHO LA TO AORTIC ROOT RATIO: 1.2
ECHO LA VOL BP: 52 ML (ref 18–58)
ECHO LA VOL MOD A2C: 55 ML (ref 18–58)
ECHO LA VOL MOD A4C: 43 ML (ref 18–58)
ECHO LA VOL/BSA BIPLANE: 26 ML/M2 (ref 16–34)
ECHO LA VOLUME INDEX MOD A2C: 28 ML/M2 (ref 16–34)
ECHO LA VOLUME INDEX MOD A4C: 22 ML/M2 (ref 16–34)
ECHO LV E' LATERAL VELOCITY: 8 CM/S
ECHO LV E' SEPTAL VELOCITY: 6 CM/S
ECHO LV EDV A2C: 27 ML
ECHO LV EDV A4C: 27 ML
ECHO LV EDV INDEX A4C: 14 ML/M2
ECHO LV EDV NDEX A2C: 14 ML/M2
ECHO LV EJECTION FRACTION A2C: 52 %
ECHO LV EJECTION FRACTION A4C: 54 %
ECHO LV EJECTION FRACTION BIPLANE: 54 % (ref 55–100)
ECHO LV ESV A2C: 13 ML
ECHO LV ESV A4C: 13 ML
ECHO LV ESV INDEX A2C: 7 ML/M2
ECHO LV ESV INDEX A4C: 7 ML/M2
ECHO LV FRACTIONAL SHORTENING: 28 % (ref 28–44)
ECHO LV INTERNAL DIMENSION DIASTOLE INDEX: 2.51 CM/M2
ECHO LV INTERNAL DIMENSION DIASTOLIC: 5 CM (ref 4.2–5.9)
ECHO LV INTERNAL DIMENSION SYSTOLIC INDEX: 1.81 CM/M2
ECHO LV INTERNAL DIMENSION SYSTOLIC: 3.6 CM
ECHO LV IVSD: 1 CM (ref 0.6–1)
ECHO LV MASS 2D: 169.9 G (ref 88–224)
ECHO LV MASS INDEX 2D: 85.4 G/M2 (ref 49–115)
ECHO LV POSTERIOR WALL DIASTOLIC: 0.9 CM (ref 0.6–1)
ECHO LV RELATIVE WALL THICKNESS RATIO: 0.36
ECHO LVOT AREA: 2.8 CM2
ECHO LVOT AV VTI INDEX: 0.83
ECHO LVOT DIAM: 1.9 CM
ECHO LVOT MEAN GRADIENT: 3 MMHG
ECHO LVOT PEAK GRADIENT: 5 MMHG
ECHO LVOT PEAK VELOCITY: 1.1 M/S
ECHO LVOT STROKE VOLUME INDEX: 32.8 ML/M2
ECHO LVOT SV: 65.2 ML
ECHO LVOT VTI: 23 CM
ECHO MV A VELOCITY: 0.4 M/S
ECHO MV AREA VTI: 2 CM2
ECHO MV E DECELERATION TIME (DT): 247 MS
ECHO MV E VELOCITY: 0.78 M/S
ECHO MV E/A RATIO: 1.95
ECHO MV E/E' LATERAL: 9.75
ECHO MV E/E' RATIO (AVERAGED): 11.38
ECHO MV E/E' SEPTAL: 13
ECHO MV LVOT VTI INDEX: 1.45
ECHO MV MAX VELOCITY: 0.8 M/S
ECHO MV MEAN GRADIENT: 1 MMHG
ECHO MV MEAN VELOCITY: 0.5 M/S
ECHO MV PEAK GRADIENT: 3 MMHG
ECHO MV VTI: 33.4 CM
ECHO PV MAX VELOCITY: 1.2 M/S
ECHO PV PEAK GRADIENT: 6 MMHG
ECHO RA AREA 4C: 16.2 CM2
ECHO RA END SYSTOLIC VOLUME APICAL 4 CHAMBER INDEX BSA: 22 ML/M2
ECHO RA VOLUME: 43 ML
ECHO RIGHT VENTRICULAR SYSTOLIC PRESSURE (RVSP): 48 MMHG
ECHO RV BASAL DIMENSION: 3.8 CM
ECHO RV FREE WALL PEAK S': 11 CM/S
ECHO RV MID DIMENSION: 2.5 CM
ECHO RV TAPSE: 1.9 CM (ref 1.7–?)
ECHO TV REGURGITANT MAX VELOCITY: 3.37 M/S
ECHO TV REGURGITANT PEAK GRADIENT: 45 MMHG
EKG ATRIAL RATE: 41 BPM
EKG ATRIAL RATE: 47 BPM
EKG ATRIAL RATE: 69 BPM
EKG ATRIAL RATE: 82 BPM
EKG P AXIS: 36 DEGREES
EKG Q-T INTERVAL: 480 MS
EKG Q-T INTERVAL: 486 MS
EKG Q-T INTERVAL: 488 MS
EKG Q-T INTERVAL: 566 MS
EKG QRS DURATION: 102 MS
EKG QRS DURATION: 106 MS
EKG QRS DURATION: 162 MS
EKG QRS DURATION: 162 MS
EKG QTC CALCULATION (BAZETT): 407 MS
EKG QTC CALCULATION (BAZETT): 449 MS
EKG QTC CALCULATION (BAZETT): 480 MS
EKG QTC CALCULATION (BAZETT): 486 MS
EKG R AXIS: -59 DEGREES
EKG R AXIS: -65 DEGREES
EKG R AXIS: -67 DEGREES
EKG R AXIS: -74 DEGREES
EKG T AXIS: 104 DEGREES
EKG T AXIS: 110 DEGREES
EKG T AXIS: 71 DEGREES
EKG T AXIS: 81 DEGREES
EKG VENTRICULAR RATE: 38 BPM
EKG VENTRICULAR RATE: 42 BPM
EKG VENTRICULAR RATE: 60 BPM
EKG VENTRICULAR RATE: 60 BPM
EOSINOPHIL # BLD: 0.09 K/UL (ref 0–0.44)
EOSINOPHILS RELATIVE PERCENT: 1 % (ref 1–4)
ERYTHROCYTE [DISTWIDTH] IN BLOOD BY AUTOMATED COUNT: 13.8 % (ref 11.8–14.4)
GFR, ESTIMATED: 38 ML/MIN/1.73M2
GLUCOSE SERPL-MCNC: 136 MG/DL (ref 74–99)
HCT VFR BLD AUTO: 39.9 % (ref 40.7–50.3)
HGB BLD-MCNC: 13 G/DL (ref 13–17)
IMM GRANULOCYTES # BLD AUTO: 0.04 K/UL (ref 0–0.3)
IMM GRANULOCYTES NFR BLD: 1 %
LYMPHOCYTES NFR BLD: 1.04 K/UL (ref 1.1–3.7)
LYMPHOCYTES RELATIVE PERCENT: 13 % (ref 24–43)
M PNEUMO IGM SER QL IA: 0.36
MAGNESIUM SERPL-MCNC: 2.6 MG/DL (ref 1.6–2.4)
MCH RBC QN AUTO: 32.7 PG (ref 25.2–33.5)
MCHC RBC AUTO-ENTMCNC: 32.6 G/DL (ref 28.4–34.8)
MCV RBC AUTO: 100.3 FL (ref 82.6–102.9)
MICROORGANISM SPEC CULT: NO GROWTH
MONOCYTES NFR BLD: 0.9 K/UL (ref 0.1–1.2)
MONOCYTES NFR BLD: 11 % (ref 3–12)
NEUTROPHILS NFR BLD: 75 % (ref 36–65)
NEUTS SEG NFR BLD: 6.19 K/UL (ref 1.5–8.1)
NRBC BLD-RTO: 0.2 PER 100 WBC
NUCLEAR IGG SER IA-RTO: 0.2 U/ML
PLATELET # BLD AUTO: 141 K/UL (ref 138–453)
PMV BLD AUTO: 10.1 FL (ref 8.1–13.5)
POTASSIUM SERPL-SCNC: 3.9 MMOL/L (ref 3.7–5.3)
PROT SERPL-MCNC: 6.1 G/DL (ref 6.6–8.7)
RBC # BLD AUTO: 3.98 M/UL (ref 4.21–5.77)
SERVICE CMNT-IMP: NORMAL
SODIUM SERPL-SCNC: 135 MMOL/L (ref 136–145)
SPECIMEN DESCRIPTION: NORMAL
WBC OTHER # BLD: 8.3 K/UL (ref 3.5–11.3)

## 2024-08-05 PROCEDURE — 93306 TTE W/DOPPLER COMPLETE: CPT | Performed by: INTERNAL MEDICINE

## 2024-08-05 PROCEDURE — 94761 N-INVAS EAR/PLS OXIMETRY MLT: CPT

## 2024-08-05 PROCEDURE — 99231 SBSQ HOSP IP/OBS SF/LOW 25: CPT | Performed by: INTERNAL MEDICINE

## 2024-08-05 PROCEDURE — 85025 COMPLETE CBC W/AUTO DIFF WBC: CPT

## 2024-08-05 PROCEDURE — 6360000002 HC RX W HCPCS

## 2024-08-05 PROCEDURE — 2000000000 HC ICU R&B

## 2024-08-05 PROCEDURE — 86140 C-REACTIVE PROTEIN: CPT

## 2024-08-05 PROCEDURE — 99232 SBSQ HOSP IP/OBS MODERATE 35: CPT | Performed by: PSYCHIATRY & NEUROLOGY

## 2024-08-05 PROCEDURE — 2580000003 HC RX 258

## 2024-08-05 PROCEDURE — 6370000000 HC RX 637 (ALT 250 FOR IP): Performed by: PSYCHIATRY & NEUROLOGY

## 2024-08-05 PROCEDURE — 83735 ASSAY OF MAGNESIUM: CPT

## 2024-08-05 PROCEDURE — 2580000003 HC RX 258: Performed by: STUDENT IN AN ORGANIZED HEALTH CARE EDUCATION/TRAINING PROGRAM

## 2024-08-05 PROCEDURE — 2700000000 HC OXYGEN THERAPY PER DAY

## 2024-08-05 PROCEDURE — 6370000000 HC RX 637 (ALT 250 FOR IP)

## 2024-08-05 PROCEDURE — 99291 CRITICAL CARE FIRST HOUR: CPT | Performed by: INTERNAL MEDICINE

## 2024-08-05 PROCEDURE — 80053 COMPREHEN METABOLIC PANEL: CPT

## 2024-08-05 PROCEDURE — 93306 TTE W/DOPPLER COMPLETE: CPT

## 2024-08-05 PROCEDURE — 99233 SBSQ HOSP IP/OBS HIGH 50: CPT | Performed by: INTERNAL MEDICINE

## 2024-08-05 PROCEDURE — APPSS30 APP SPLIT SHARED TIME 16-30 MINUTES: Performed by: NURSE PRACTITIONER

## 2024-08-05 PROCEDURE — 87641 MR-STAPH DNA AMP PROBE: CPT

## 2024-08-05 PROCEDURE — 36415 COLL VENOUS BLD VENIPUNCTURE: CPT

## 2024-08-05 RX ORDER — FUROSEMIDE 40 MG/1
40 TABLET ORAL DAILY
Status: DISCONTINUED | OUTPATIENT
Start: 2024-08-05 | End: 2024-08-14 | Stop reason: HOSPADM

## 2024-08-05 RX ADMIN — SUCRALFATE 1 G: 1 TABLET ORAL at 05:48

## 2024-08-05 RX ADMIN — SODIUM CHLORIDE, PRESERVATIVE FREE 10 ML: 5 INJECTION INTRAVENOUS at 20:45

## 2024-08-05 RX ADMIN — SUCRALFATE 1 G: 1 TABLET ORAL at 11:37

## 2024-08-05 RX ADMIN — FUROSEMIDE 40 MG: 40 TABLET ORAL at 11:37

## 2024-08-05 RX ADMIN — SODIUM CHLORIDE, PRESERVATIVE FREE 40 MG: 5 INJECTION INTRAVENOUS at 09:16

## 2024-08-05 RX ADMIN — ENTACAPONE 200 MG: 200 TABLET, FILM COATED ORAL at 17:41

## 2024-08-05 RX ADMIN — AMITRIPTYLINE HYDROCHLORIDE 10 MG: 10 TABLET, FILM COATED ORAL at 20:43

## 2024-08-05 RX ADMIN — SODIUM CHLORIDE, PRESERVATIVE FREE 10 ML: 5 INJECTION INTRAVENOUS at 09:17

## 2024-08-05 RX ADMIN — METRONIDAZOLE 500 MG: 500 INJECTION, SOLUTION INTRAVENOUS at 00:21

## 2024-08-05 RX ADMIN — Medication 1000 MG: at 09:10

## 2024-08-05 RX ADMIN — ENTACAPONE 200 MG: 200 TABLET, FILM COATED ORAL at 09:13

## 2024-08-05 RX ADMIN — GABAPENTIN 100 MG: 100 CAPSULE ORAL at 09:16

## 2024-08-05 RX ADMIN — ENTACAPONE 200 MG: 200 TABLET, FILM COATED ORAL at 13:28

## 2024-08-05 RX ADMIN — ENTACAPONE 200 MG: 200 TABLET, FILM COATED ORAL at 20:44

## 2024-08-05 RX ADMIN — CARBIDOPA AND LEVODOPA 1 TABLET: 25; 100 TABLET ORAL at 17:41

## 2024-08-05 RX ADMIN — METRONIDAZOLE 500 MG: 500 INJECTION, SOLUTION INTRAVENOUS at 09:11

## 2024-08-05 RX ADMIN — SUCRALFATE 1 G: 1 TABLET ORAL at 17:41

## 2024-08-05 RX ADMIN — Medication 3 MG: at 20:43

## 2024-08-05 RX ADMIN — SODIUM CHLORIDE: 9 INJECTION, SOLUTION INTRAVENOUS at 04:04

## 2024-08-05 RX ADMIN — ROPINIROLE HYDROCHLORIDE 0.5 MG: 0.5 TABLET, FILM COATED ORAL at 20:43

## 2024-08-05 RX ADMIN — HEPARIN SODIUM 5000 UNITS: 5000 INJECTION INTRAVENOUS; SUBCUTANEOUS at 06:02

## 2024-08-05 RX ADMIN — SUCRALFATE 1 G: 1 TABLET ORAL at 00:20

## 2024-08-05 RX ADMIN — ACETAMINOPHEN 650 MG: 325 TABLET ORAL at 13:28

## 2024-08-05 RX ADMIN — CARBIDOPA AND LEVODOPA 1 TABLET: 25; 100 TABLET ORAL at 09:13

## 2024-08-05 RX ADMIN — ROPINIROLE HYDROCHLORIDE 0.5 MG: 0.5 TABLET, FILM COATED ORAL at 13:27

## 2024-08-05 RX ADMIN — CARBIDOPA AND LEVODOPA 1 TABLET: 25; 100 TABLET ORAL at 20:43

## 2024-08-05 RX ADMIN — CARBIDOPA AND LEVODOPA 1 TABLET: 25; 100 TABLET ORAL at 13:28

## 2024-08-05 RX ADMIN — ROPINIROLE HYDROCHLORIDE 0.5 MG: 0.5 TABLET, FILM COATED ORAL at 09:19

## 2024-08-05 RX ADMIN — SODIUM CHLORIDE, PRESERVATIVE FREE 10 ML: 5 INJECTION INTRAVENOUS at 09:16

## 2024-08-06 LAB
ALBUMIN PERCENT: 53 % (ref 45–65)
ALBUMIN SERPL-MCNC: 3.2 G/DL (ref 3.2–5.2)
ALBUMIN SERPL-MCNC: 3.2 G/DL (ref 3.5–5.2)
ALBUMIN/GLOB SERPL: 1 {RATIO} (ref 1–2.5)
ALP SERPL-CCNC: 85 U/L (ref 40–129)
ALPHA 2 PERCENT: 15 % (ref 6–13)
ALPHA1 GLOB SERPL ELPH-MCNC: 0.4 G/DL (ref 0.1–0.4)
ALPHA1 GLOB SERPL ELPH-MCNC: 7 % (ref 3–6)
ALPHA2 GLOB SERPL ELPH-MCNC: 0.9 G/DL (ref 0.5–0.9)
ALT SERPL-CCNC: 10 U/L (ref 10–50)
ANION GAP SERPL CALCULATED.3IONS-SCNC: 12 MMOL/L (ref 9–16)
AST SERPL-CCNC: 23 U/L (ref 10–50)
B-GLOBULIN SERPL ELPH-MCNC: 0.7 G/DL (ref 0.5–1.1)
B-GLOBULIN SERPL ELPH-MCNC: 12 % (ref 11–19)
BASOPHILS # BLD: 0.03 K/UL (ref 0–0.2)
BASOPHILS NFR BLD: 0 % (ref 0–2)
BILIRUB SERPL-MCNC: 0.6 MG/DL (ref 0–1.2)
BUN SERPL-MCNC: 34 MG/DL (ref 8–23)
CALCIUM SERPL-MCNC: 9.1 MG/DL (ref 8.6–10.4)
CHLORIDE SERPL-SCNC: 103 MMOL/L (ref 98–107)
CO2 SERPL-SCNC: 21 MMOL/L (ref 20–31)
CREAT SERPL-MCNC: 1.3 MG/DL (ref 0.7–1.2)
EOSINOPHIL # BLD: 0.24 K/UL (ref 0–0.44)
EOSINOPHILS RELATIVE PERCENT: 3 % (ref 1–4)
ERYTHROCYTE [DISTWIDTH] IN BLOOD BY AUTOMATED COUNT: 14 % (ref 11.8–14.4)
GAMMA GLOB SERPL ELPH-MCNC: 0.8 G/DL (ref 0.5–1.5)
GAMMA GLOBULIN %: 14 % (ref 9–20)
GFR, ESTIMATED: 53 ML/MIN/1.73M2
GLUCOSE SERPL-MCNC: 158 MG/DL (ref 74–99)
HCT VFR BLD AUTO: 41.5 % (ref 40.7–50.3)
HGB BLD-MCNC: 13.9 G/DL (ref 13–17)
IMM GRANULOCYTES # BLD AUTO: 0.05 K/UL (ref 0–0.3)
IMM GRANULOCYTES NFR BLD: 1 %
ITYP INTERPRETATION: NORMAL
LYMPHOCYTES NFR BLD: 1.33 K/UL (ref 1.1–3.7)
LYMPHOCYTES RELATIVE PERCENT: 14 % (ref 24–43)
MCH RBC QN AUTO: 32.7 PG (ref 25.2–33.5)
MCHC RBC AUTO-ENTMCNC: 33.5 G/DL (ref 28.4–34.8)
MCV RBC AUTO: 97.6 FL (ref 82.6–102.9)
MONOCYTES NFR BLD: 0.74 K/UL (ref 0.1–1.2)
MONOCYTES NFR BLD: 8 % (ref 3–12)
MRSA, DNA, NASAL: NEGATIVE
NEUTROPHILS NFR BLD: 74 % (ref 36–65)
NEUTS SEG NFR BLD: 6.97 K/UL (ref 1.5–8.1)
NRBC BLD-RTO: 0.2 PER 100 WBC
PATH REV: NORMAL
PATHOLOGIST: ABNORMAL
PLATELET # BLD AUTO: 181 K/UL (ref 138–453)
PMV BLD AUTO: 9.7 FL (ref 8.1–13.5)
POTASSIUM SERPL-SCNC: 4.1 MMOL/L (ref 3.7–5.3)
PROT PATTERN SERPL ELPH-IMP: ABNORMAL
PROT SERPL-MCNC: 6.1 G/DL (ref 6.6–8.7)
PROT SERPL-MCNC: 6.5 G/DL (ref 6.6–8.7)
RBC # BLD AUTO: 4.25 M/UL (ref 4.21–5.77)
SODIUM SERPL-SCNC: 136 MMOL/L (ref 136–145)
SPECIMEN DESCRIPTION: NORMAL
TOTAL PROT. SUM,%: 101 % (ref 98–102)
TOTAL PROT. SUM: 6 G/DL (ref 6.3–8.2)
WBC OTHER # BLD: 9.4 K/UL (ref 3.5–11.3)

## 2024-08-06 PROCEDURE — 6360000002 HC RX W HCPCS

## 2024-08-06 PROCEDURE — 36415 COLL VENOUS BLD VENIPUNCTURE: CPT

## 2024-08-06 PROCEDURE — 99233 SBSQ HOSP IP/OBS HIGH 50: CPT | Performed by: INTERNAL MEDICINE

## 2024-08-06 PROCEDURE — 6370000000 HC RX 637 (ALT 250 FOR IP)

## 2024-08-06 PROCEDURE — 85025 COMPLETE CBC W/AUTO DIFF WBC: CPT

## 2024-08-06 PROCEDURE — 6370000000 HC RX 637 (ALT 250 FOR IP): Performed by: PSYCHIATRY & NEUROLOGY

## 2024-08-06 PROCEDURE — 2000000000 HC ICU R&B

## 2024-08-06 PROCEDURE — 80053 COMPREHEN METABOLIC PANEL: CPT

## 2024-08-06 PROCEDURE — 2580000003 HC RX 258: Performed by: STUDENT IN AN ORGANIZED HEALTH CARE EDUCATION/TRAINING PROGRAM

## 2024-08-06 PROCEDURE — 2580000003 HC RX 258

## 2024-08-06 PROCEDURE — 99291 CRITICAL CARE FIRST HOUR: CPT | Performed by: INTERNAL MEDICINE

## 2024-08-06 RX ORDER — SODIUM CHLORIDE, SODIUM LACTATE, POTASSIUM CHLORIDE, CALCIUM CHLORIDE 600; 310; 30; 20 MG/100ML; MG/100ML; MG/100ML; MG/100ML
INJECTION, SOLUTION INTRAVENOUS CONTINUOUS
Status: DISCONTINUED | OUTPATIENT
Start: 2024-08-07 | End: 2024-08-08

## 2024-08-06 RX ORDER — SODIUM CHLORIDE, SODIUM LACTATE, POTASSIUM CHLORIDE, CALCIUM CHLORIDE 600; 310; 30; 20 MG/100ML; MG/100ML; MG/100ML; MG/100ML
INJECTION, SOLUTION INTRAVENOUS CONTINUOUS
Status: DISCONTINUED | OUTPATIENT
Start: 2024-08-07 | End: 2024-08-07

## 2024-08-06 RX ADMIN — Medication 1000 MG: at 08:30

## 2024-08-06 RX ADMIN — ENTACAPONE 200 MG: 200 TABLET, FILM COATED ORAL at 08:15

## 2024-08-06 RX ADMIN — SUCRALFATE 1 G: 1 TABLET ORAL at 11:36

## 2024-08-06 RX ADMIN — SODIUM CHLORIDE, PRESERVATIVE FREE 10 ML: 5 INJECTION INTRAVENOUS at 08:14

## 2024-08-06 RX ADMIN — FUROSEMIDE 40 MG: 40 TABLET ORAL at 08:13

## 2024-08-06 RX ADMIN — SODIUM CHLORIDE, PRESERVATIVE FREE 10 ML: 5 INJECTION INTRAVENOUS at 20:48

## 2024-08-06 RX ADMIN — SUCRALFATE 1 G: 1 TABLET ORAL at 18:07

## 2024-08-06 RX ADMIN — CARBIDOPA AND LEVODOPA 1 TABLET: 25; 100 TABLET ORAL at 13:52

## 2024-08-06 RX ADMIN — CARBIDOPA AND LEVODOPA 1 TABLET: 25; 100 TABLET ORAL at 08:14

## 2024-08-06 RX ADMIN — Medication 3 MG: at 20:48

## 2024-08-06 RX ADMIN — SODIUM CHLORIDE, PRESERVATIVE FREE 40 MG: 5 INJECTION INTRAVENOUS at 08:11

## 2024-08-06 RX ADMIN — ROPINIROLE HYDROCHLORIDE 0.5 MG: 0.5 TABLET, FILM COATED ORAL at 08:11

## 2024-08-06 RX ADMIN — CARBIDOPA AND LEVODOPA 1 TABLET: 25; 100 TABLET ORAL at 20:48

## 2024-08-06 RX ADMIN — SUCRALFATE 1 G: 1 TABLET ORAL at 05:35

## 2024-08-06 RX ADMIN — SUCRALFATE 1 G: 1 TABLET ORAL at 00:18

## 2024-08-06 RX ADMIN — ENTACAPONE 200 MG: 200 TABLET, FILM COATED ORAL at 13:52

## 2024-08-06 RX ADMIN — GABAPENTIN 100 MG: 100 CAPSULE ORAL at 08:11

## 2024-08-06 RX ADMIN — CARBIDOPA AND LEVODOPA 1 TABLET: 25; 100 TABLET ORAL at 17:59

## 2024-08-06 RX ADMIN — ENTACAPONE 200 MG: 200 TABLET, FILM COATED ORAL at 17:59

## 2024-08-06 RX ADMIN — ROPINIROLE HYDROCHLORIDE 0.5 MG: 0.5 TABLET, FILM COATED ORAL at 18:00

## 2024-08-06 RX ADMIN — SODIUM CHLORIDE, PRESERVATIVE FREE 10 ML: 5 INJECTION INTRAVENOUS at 08:13

## 2024-08-06 RX ADMIN — ENTACAPONE 200 MG: 200 TABLET, FILM COATED ORAL at 20:48

## 2024-08-06 RX ADMIN — ROPINIROLE HYDROCHLORIDE 0.5 MG: 0.5 TABLET, FILM COATED ORAL at 13:52

## 2024-08-06 RX ADMIN — AMITRIPTYLINE HYDROCHLORIDE 10 MG: 10 TABLET, FILM COATED ORAL at 20:48

## 2024-08-06 NOTE — ACP (ADVANCE CARE PLANNING)
and portable DNR orders.    Length of ACP Conversation in minutes:      Conversation Outcomes:  ACP discussion completed    Follow-up plan:    [] Schedule follow-up conversation to continue planning  [] Referred individual to Provider for additional questions/concerns   [x] Advised patient/agent/surrogate to review completed ACP document and update if needed with changes in condition, patient preferences or care setting    [] This note routed to one or more involved healthcare providers

## 2024-08-06 NOTE — CARE COORDINATION
Transitional planning. A & O X 2, 2L NC, PPM 8/7, plan is home w/ friends and [facility] - current/ verified. Has DME,     Spoke to Sasha rhoades/ [facility] , she confirmed pt is current w/ their services and they can resume care when pt is dc'd.

## 2024-08-07 LAB
ANION GAP SERPL CALCULATED.3IONS-SCNC: 14 MMOL/L (ref 9–16)
BASOPHILS # BLD: 0.03 K/UL (ref 0–0.2)
BASOPHILS NFR BLD: 0 % (ref 0–2)
BUN SERPL-MCNC: 36 MG/DL (ref 8–23)
CALCIUM SERPL-MCNC: 9 MG/DL (ref 8.6–10.4)
CHLORIDE SERPL-SCNC: 104 MMOL/L (ref 98–107)
CO2 SERPL-SCNC: 18 MMOL/L (ref 20–31)
CREAT SERPL-MCNC: 1.4 MG/DL (ref 0.7–1.2)
ECHO BSA: 2.07 M2
EOSINOPHIL # BLD: 0.24 K/UL (ref 0–0.44)
EOSINOPHILS RELATIVE PERCENT: 3 % (ref 1–4)
ERYTHROCYTE [DISTWIDTH] IN BLOOD BY AUTOMATED COUNT: 14 % (ref 11.8–14.4)
GFR, ESTIMATED: 48 ML/MIN/1.73M2
GLUCOSE SERPL-MCNC: 139 MG/DL (ref 74–99)
HCT VFR BLD AUTO: 46.5 % (ref 40.7–50.3)
HGB BLD-MCNC: 15.3 G/DL (ref 13–17)
IMM GRANULOCYTES # BLD AUTO: 0.04 K/UL (ref 0–0.3)
IMM GRANULOCYTES NFR BLD: 1 %
LYMPHOCYTES NFR BLD: 1.34 K/UL (ref 1.1–3.7)
LYMPHOCYTES RELATIVE PERCENT: 17 % (ref 24–43)
MAGNESIUM SERPL-MCNC: 2.4 MG/DL (ref 1.6–2.4)
MCH RBC QN AUTO: 32.8 PG (ref 25.2–33.5)
MCHC RBC AUTO-ENTMCNC: 32.9 G/DL (ref 28.4–34.8)
MCV RBC AUTO: 99.6 FL (ref 82.6–102.9)
MONOCYTES NFR BLD: 0.71 K/UL (ref 0.1–1.2)
MONOCYTES NFR BLD: 9 % (ref 3–12)
NEUTROPHILS NFR BLD: 70 % (ref 36–65)
NEUTS SEG NFR BLD: 5.32 K/UL (ref 1.5–8.1)
NRBC BLD-RTO: 0.3 PER 100 WBC
PLATELET # BLD AUTO: 185 K/UL (ref 138–453)
PMV BLD AUTO: 9.8 FL (ref 8.1–13.5)
POTASSIUM SERPL-SCNC: 4 MMOL/L (ref 3.7–5.3)
RBC # BLD AUTO: 4.67 M/UL (ref 4.21–5.77)
SODIUM SERPL-SCNC: 136 MMOL/L (ref 136–145)
WBC OTHER # BLD: 7.7 K/UL (ref 3.5–11.3)

## 2024-08-07 PROCEDURE — 2580000003 HC RX 258: Performed by: NURSE PRACTITIONER

## 2024-08-07 PROCEDURE — 2000000000 HC ICU R&B

## 2024-08-07 PROCEDURE — 83735 ASSAY OF MAGNESIUM: CPT

## 2024-08-07 PROCEDURE — 6370000000 HC RX 637 (ALT 250 FOR IP): Performed by: PSYCHIATRY & NEUROLOGY

## 2024-08-07 PROCEDURE — 6360000002 HC RX W HCPCS

## 2024-08-07 PROCEDURE — 6370000000 HC RX 637 (ALT 250 FOR IP)

## 2024-08-07 PROCEDURE — 85025 COMPLETE CBC W/AUTO DIFF WBC: CPT

## 2024-08-07 PROCEDURE — 6360000002 HC RX W HCPCS: Performed by: NURSE PRACTITIONER

## 2024-08-07 PROCEDURE — 99153 MOD SED SAME PHYS/QHP EA: CPT | Performed by: INTERNAL MEDICINE

## 2024-08-07 PROCEDURE — 2580000003 HC RX 258

## 2024-08-07 PROCEDURE — C1889 IMPLANT/INSERT DEVICE, NOC: HCPCS | Performed by: INTERNAL MEDICINE

## 2024-08-07 PROCEDURE — 02HK3JZ INSERTION OF PACEMAKER LEAD INTO RIGHT VENTRICLE, PERCUTANEOUS APPROACH: ICD-10-PCS | Performed by: INTERNAL MEDICINE

## 2024-08-07 PROCEDURE — C1785 PMKR, DUAL, RATE-RESP: HCPCS | Performed by: INTERNAL MEDICINE

## 2024-08-07 PROCEDURE — 2580000003 HC RX 258: Performed by: STUDENT IN AN ORGANIZED HEALTH CARE EDUCATION/TRAINING PROGRAM

## 2024-08-07 PROCEDURE — 99152 MOD SED SAME PHYS/QHP 5/>YRS: CPT | Performed by: INTERNAL MEDICINE

## 2024-08-07 PROCEDURE — 99291 CRITICAL CARE FIRST HOUR: CPT | Performed by: INTERNAL MEDICINE

## 2024-08-07 PROCEDURE — 6360000002 HC RX W HCPCS: Performed by: INTERNAL MEDICINE

## 2024-08-07 PROCEDURE — 99233 SBSQ HOSP IP/OBS HIGH 50: CPT | Performed by: INTERNAL MEDICINE

## 2024-08-07 PROCEDURE — 2580000003 HC RX 258: Performed by: INTERNAL MEDICINE

## 2024-08-07 PROCEDURE — 2709999900 HC NON-CHARGEABLE SUPPLY: Performed by: INTERNAL MEDICINE

## 2024-08-07 PROCEDURE — C1892 INTRO/SHEATH,FIXED,PEEL-AWAY: HCPCS | Performed by: INTERNAL MEDICINE

## 2024-08-07 PROCEDURE — 36415 COLL VENOUS BLD VENIPUNCTURE: CPT

## 2024-08-07 PROCEDURE — 94761 N-INVAS EAR/PLS OXIMETRY MLT: CPT

## 2024-08-07 PROCEDURE — 2500000003 HC RX 250 WO HCPCS: Performed by: INTERNAL MEDICINE

## 2024-08-07 PROCEDURE — 02H63JZ INSERTION OF PACEMAKER LEAD INTO RIGHT ATRIUM, PERCUTANEOUS APPROACH: ICD-10-PCS | Performed by: INTERNAL MEDICINE

## 2024-08-07 PROCEDURE — 33208 INSRT HEART PM ATRIAL & VENT: CPT | Performed by: INTERNAL MEDICINE

## 2024-08-07 PROCEDURE — 3E0132A INTRODUCTION OF ANTI-INFECTIVE ENVELOPE INTO SUBCUTANEOUS TISSUE, PERCUTANEOUS APPROACH: ICD-10-PCS | Performed by: INTERNAL MEDICINE

## 2024-08-07 PROCEDURE — 80048 BASIC METABOLIC PNL TOTAL CA: CPT

## 2024-08-07 PROCEDURE — 0JH606Z INSERTION OF PACEMAKER, DUAL CHAMBER INTO CHEST SUBCUTANEOUS TISSUE AND FASCIA, OPEN APPROACH: ICD-10-PCS | Performed by: INTERNAL MEDICINE

## 2024-08-07 PROCEDURE — C1898 LEAD, PMKR, OTHER THAN TRANS: HCPCS | Performed by: INTERNAL MEDICINE

## 2024-08-07 RX ORDER — SODIUM CHLORIDE 0.9 % (FLUSH) 0.9 %
5-40 SYRINGE (ML) INJECTION PRN
Status: DISCONTINUED | OUTPATIENT
Start: 2024-08-07 | End: 2024-08-14 | Stop reason: HOSPADM

## 2024-08-07 RX ORDER — FENTANYL CITRATE 50 UG/ML
INJECTION, SOLUTION INTRAMUSCULAR; INTRAVENOUS PRN
Status: DISCONTINUED | OUTPATIENT
Start: 2024-08-07 | End: 2024-08-07 | Stop reason: HOSPADM

## 2024-08-07 RX ORDER — SODIUM CHLORIDE 9 MG/ML
INJECTION, SOLUTION INTRAVENOUS PRN
Status: DISCONTINUED | OUTPATIENT
Start: 2024-08-07 | End: 2024-08-14 | Stop reason: HOSPADM

## 2024-08-07 RX ORDER — SODIUM CHLORIDE 0.9 % (FLUSH) 0.9 %
5-40 SYRINGE (ML) INJECTION EVERY 12 HOURS SCHEDULED
Status: DISCONTINUED | OUTPATIENT
Start: 2024-08-07 | End: 2024-08-14 | Stop reason: HOSPADM

## 2024-08-07 RX ORDER — MIDAZOLAM HYDROCHLORIDE 1 MG/ML
INJECTION INTRAMUSCULAR; INTRAVENOUS PRN
Status: DISCONTINUED | OUTPATIENT
Start: 2024-08-07 | End: 2024-08-07 | Stop reason: HOSPADM

## 2024-08-07 RX ORDER — LIDOCAINE HYDROCHLORIDE AND EPINEPHRINE 10; 10 MG/ML; UG/ML
INJECTION, SOLUTION INFILTRATION; PERINEURAL PRN
Status: DISCONTINUED | OUTPATIENT
Start: 2024-08-07 | End: 2024-08-07 | Stop reason: HOSPADM

## 2024-08-07 RX ADMIN — SUCRALFATE 1 G: 1 TABLET ORAL at 00:35

## 2024-08-07 RX ADMIN — SUCRALFATE 1 G: 1 TABLET ORAL at 12:16

## 2024-08-07 RX ADMIN — ENTACAPONE 200 MG: 200 TABLET, FILM COATED ORAL at 08:02

## 2024-08-07 RX ADMIN — ROPINIROLE HYDROCHLORIDE 0.5 MG: 0.5 TABLET, FILM COATED ORAL at 08:02

## 2024-08-07 RX ADMIN — SODIUM CHLORIDE, POTASSIUM CHLORIDE, SODIUM LACTATE AND CALCIUM CHLORIDE: 600; 310; 30; 20 INJECTION, SOLUTION INTRAVENOUS at 01:03

## 2024-08-07 RX ADMIN — SODIUM CHLORIDE, PRESERVATIVE FREE 10 ML: 5 INJECTION INTRAVENOUS at 19:59

## 2024-08-07 RX ADMIN — FUROSEMIDE 40 MG: 40 TABLET ORAL at 07:51

## 2024-08-07 RX ADMIN — GABAPENTIN 100 MG: 100 CAPSULE ORAL at 08:02

## 2024-08-07 RX ADMIN — Medication 3 MG: at 20:00

## 2024-08-07 RX ADMIN — ROPINIROLE HYDROCHLORIDE 0.5 MG: 0.5 TABLET, FILM COATED ORAL at 22:25

## 2024-08-07 RX ADMIN — CARBIDOPA AND LEVODOPA 1 TABLET: 25; 100 TABLET ORAL at 22:26

## 2024-08-07 RX ADMIN — ENTACAPONE 200 MG: 200 TABLET, FILM COATED ORAL at 22:27

## 2024-08-07 RX ADMIN — AMITRIPTYLINE HYDROCHLORIDE 10 MG: 10 TABLET, FILM COATED ORAL at 20:00

## 2024-08-07 RX ADMIN — ENTACAPONE 200 MG: 200 TABLET, FILM COATED ORAL at 18:49

## 2024-08-07 RX ADMIN — ENTACAPONE 200 MG: 200 TABLET, FILM COATED ORAL at 12:16

## 2024-08-07 RX ADMIN — ACETAMINOPHEN 650 MG: 325 TABLET ORAL at 20:10

## 2024-08-07 RX ADMIN — SODIUM CHLORIDE, PRESERVATIVE FREE 40 MG: 5 INJECTION INTRAVENOUS at 07:51

## 2024-08-07 RX ADMIN — CARBIDOPA AND LEVODOPA 1 TABLET: 25; 100 TABLET ORAL at 12:16

## 2024-08-07 RX ADMIN — Medication 1000 MG: at 08:41

## 2024-08-07 RX ADMIN — SUCRALFATE 1 G: 1 TABLET ORAL at 18:49

## 2024-08-07 RX ADMIN — CARBIDOPA AND LEVODOPA 1 TABLET: 25; 100 TABLET ORAL at 18:49

## 2024-08-07 RX ADMIN — ROPINIROLE HYDROCHLORIDE 0.5 MG: 0.5 TABLET, FILM COATED ORAL at 13:49

## 2024-08-07 RX ADMIN — SUCRALFATE 1 G: 1 TABLET ORAL at 05:31

## 2024-08-07 RX ADMIN — ACETAMINOPHEN 650 MG: 325 TABLET ORAL at 01:54

## 2024-08-07 RX ADMIN — SUCRALFATE 1 G: 1 TABLET ORAL at 23:26

## 2024-08-07 RX ADMIN — SODIUM CHLORIDE, PRESERVATIVE FREE 10 ML: 5 INJECTION INTRAVENOUS at 20:00

## 2024-08-07 RX ADMIN — Medication 1000 MG: at 16:06

## 2024-08-07 RX ADMIN — CARBIDOPA AND LEVODOPA 1 TABLET: 25; 100 TABLET ORAL at 08:01

## 2024-08-07 RX ADMIN — VANCOMYCIN HYDROCHLORIDE 1250 MG: 1.25 INJECTION, POWDER, LYOPHILIZED, FOR SOLUTION INTRAVENOUS at 16:08

## 2024-08-07 ASSESSMENT — PAIN SCALES - GENERAL
PAINLEVEL_OUTOF10: 8
PAINLEVEL_OUTOF10: 5
PAINLEVEL_OUTOF10: 0

## 2024-08-07 ASSESSMENT — PAIN DESCRIPTION - LOCATION: LOCATION: HEAD

## 2024-08-07 NOTE — CARE COORDINATION
Transitional planning. A & O X 4, RA, PPM today between 1:30-2pm. Plan is home w/ [facility]- Current/Verified. Has transportation and established PCP. Lives w/ friends

## 2024-08-07 NOTE — BRIEF OP NOTE
during the procedure and remained stable.      PLAN:  Bedrest overnight with CXR and bedside PPM evaluation AM 8/8.  Would not pursue long-term anticoagulation due to limited mobility and high fall risk      Electronically signed by Omid Stone MD on 8/7/2024 at 6:53 PM

## 2024-08-07 NOTE — FLOWSHEET NOTE
[facility] - MARCO ANTONIO Cartwright case manager  Phone [phone]

## 2024-08-08 ENCOUNTER — APPOINTMENT (OUTPATIENT)
Dept: GENERAL RADIOLOGY | Age: 84
DRG: 242 | End: 2024-08-08
Payer: MEDICARE

## 2024-08-08 LAB
ANION GAP SERPL CALCULATED.3IONS-SCNC: 13 MMOL/L (ref 9–16)
BASOPHILS # BLD: 0.03 K/UL (ref 0–0.2)
BASOPHILS NFR BLD: 1 % (ref 0–2)
BUN SERPL-MCNC: 36 MG/DL (ref 8–23)
CALCIUM SERPL-MCNC: 8.5 MG/DL (ref 8.6–10.4)
CHLORIDE SERPL-SCNC: 104 MMOL/L (ref 98–107)
CO2 SERPL-SCNC: 21 MMOL/L (ref 20–31)
CREAT SERPL-MCNC: 1.4 MG/DL (ref 0.7–1.2)
EOSINOPHIL # BLD: 0.2 K/UL (ref 0–0.44)
EOSINOPHILS RELATIVE PERCENT: 3 % (ref 1–4)
ERYTHROCYTE [DISTWIDTH] IN BLOOD BY AUTOMATED COUNT: 13.9 % (ref 11.8–14.4)
GFR, ESTIMATED: 50 ML/MIN/1.73M2
GLUCOSE SERPL-MCNC: 120 MG/DL (ref 74–99)
HCT VFR BLD AUTO: 42 % (ref 40.7–50.3)
HGB BLD-MCNC: 13.8 G/DL (ref 13–17)
IMM GRANULOCYTES # BLD AUTO: 0.03 K/UL (ref 0–0.3)
IMM GRANULOCYTES NFR BLD: 1 %
LYMPHOCYTES NFR BLD: 1.18 K/UL (ref 1.1–3.7)
LYMPHOCYTES RELATIVE PERCENT: 18 % (ref 24–43)
MCH RBC QN AUTO: 32.4 PG (ref 25.2–33.5)
MCHC RBC AUTO-ENTMCNC: 32.9 G/DL (ref 28.4–34.8)
MCV RBC AUTO: 98.6 FL (ref 82.6–102.9)
MICROORGANISM SPEC CULT: NORMAL
MICROORGANISM SPEC CULT: NORMAL
MONOCYTES NFR BLD: 0.68 K/UL (ref 0.1–1.2)
MONOCYTES NFR BLD: 10 % (ref 3–12)
NEUTROPHILS NFR BLD: 67 % (ref 36–65)
NEUTS SEG NFR BLD: 4.49 K/UL (ref 1.5–8.1)
NRBC BLD-RTO: 0 PER 100 WBC
PLATELET # BLD AUTO: 181 K/UL (ref 138–453)
PMV BLD AUTO: 9.6 FL (ref 8.1–13.5)
POTASSIUM SERPL-SCNC: 4 MMOL/L (ref 3.7–5.3)
RBC # BLD AUTO: 4.26 M/UL (ref 4.21–5.77)
SERVICE CMNT-IMP: NORMAL
SERVICE CMNT-IMP: NORMAL
SODIUM SERPL-SCNC: 138 MMOL/L (ref 136–145)
SPECIMEN DESCRIPTION: NORMAL
SPECIMEN DESCRIPTION: NORMAL
WBC OTHER # BLD: 6.6 K/UL (ref 3.5–11.3)

## 2024-08-08 PROCEDURE — 1200000000 HC SEMI PRIVATE

## 2024-08-08 PROCEDURE — 6360000002 HC RX W HCPCS

## 2024-08-08 PROCEDURE — 2580000003 HC RX 258

## 2024-08-08 PROCEDURE — 36415 COLL VENOUS BLD VENIPUNCTURE: CPT

## 2024-08-08 PROCEDURE — 85025 COMPLETE CBC W/AUTO DIFF WBC: CPT

## 2024-08-08 PROCEDURE — 92610 EVALUATE SWALLOWING FUNCTION: CPT

## 2024-08-08 PROCEDURE — 71045 X-RAY EXAM CHEST 1 VIEW: CPT

## 2024-08-08 PROCEDURE — 2580000003 HC RX 258: Performed by: STUDENT IN AN ORGANIZED HEALTH CARE EDUCATION/TRAINING PROGRAM

## 2024-08-08 PROCEDURE — 80048 BASIC METABOLIC PNL TOTAL CA: CPT

## 2024-08-08 PROCEDURE — 2580000003 HC RX 258: Performed by: INTERNAL MEDICINE

## 2024-08-08 PROCEDURE — 6370000000 HC RX 637 (ALT 250 FOR IP)

## 2024-08-08 PROCEDURE — 6370000000 HC RX 637 (ALT 250 FOR IP): Performed by: PSYCHIATRY & NEUROLOGY

## 2024-08-08 PROCEDURE — 92611 MOTION FLUOROSCOPY/SWALLOW: CPT

## 2024-08-08 PROCEDURE — 74230 X-RAY XM SWLNG FUNCJ C+: CPT

## 2024-08-08 PROCEDURE — 99233 SBSQ HOSP IP/OBS HIGH 50: CPT | Performed by: INTERNAL MEDICINE

## 2024-08-08 RX ADMIN — SODIUM CHLORIDE: 9 INJECTION, SOLUTION INTRAVENOUS at 03:25

## 2024-08-08 RX ADMIN — Medication 1000 MG: at 10:26

## 2024-08-08 RX ADMIN — SUCRALFATE 1 G: 1 TABLET ORAL at 05:14

## 2024-08-08 RX ADMIN — SODIUM CHLORIDE, PRESERVATIVE FREE 40 MG: 5 INJECTION INTRAVENOUS at 07:32

## 2024-08-08 RX ADMIN — CARBIDOPA AND LEVODOPA 1 TABLET: 25; 100 TABLET ORAL at 20:40

## 2024-08-08 RX ADMIN — CARBIDOPA AND LEVODOPA 1 TABLET: 25; 100 TABLET ORAL at 07:35

## 2024-08-08 RX ADMIN — SODIUM CHLORIDE, PRESERVATIVE FREE 5 ML: 5 INJECTION INTRAVENOUS at 21:26

## 2024-08-08 RX ADMIN — SODIUM CHLORIDE, PRESERVATIVE FREE 10 ML: 5 INJECTION INTRAVENOUS at 07:32

## 2024-08-08 RX ADMIN — ENTACAPONE 200 MG: 200 TABLET, FILM COATED ORAL at 07:35

## 2024-08-08 RX ADMIN — ENTACAPONE 200 MG: 200 TABLET, FILM COATED ORAL at 21:26

## 2024-08-08 RX ADMIN — ROPINIROLE HYDROCHLORIDE 0.5 MG: 0.5 TABLET, FILM COATED ORAL at 13:12

## 2024-08-08 RX ADMIN — ROPINIROLE HYDROCHLORIDE 0.5 MG: 0.5 TABLET, FILM COATED ORAL at 07:35

## 2024-08-08 RX ADMIN — FUROSEMIDE 40 MG: 40 TABLET ORAL at 07:32

## 2024-08-08 RX ADMIN — ROPINIROLE HYDROCHLORIDE 0.5 MG: 0.5 TABLET, FILM COATED ORAL at 20:41

## 2024-08-08 RX ADMIN — CARBIDOPA AND LEVODOPA 1 TABLET: 25; 100 TABLET ORAL at 16:35

## 2024-08-08 RX ADMIN — SUCRALFATE 1 G: 1 TABLET ORAL at 10:26

## 2024-08-08 RX ADMIN — AMITRIPTYLINE HYDROCHLORIDE 10 MG: 10 TABLET, FILM COATED ORAL at 20:40

## 2024-08-08 RX ADMIN — Medication 3 MG: at 20:40

## 2024-08-08 RX ADMIN — ACETAMINOPHEN 650 MG: 325 TABLET ORAL at 20:40

## 2024-08-08 RX ADMIN — ENTACAPONE 200 MG: 200 TABLET, FILM COATED ORAL at 13:12

## 2024-08-08 RX ADMIN — CARBIDOPA AND LEVODOPA 1 TABLET: 25; 100 TABLET ORAL at 13:12

## 2024-08-08 RX ADMIN — SODIUM CHLORIDE, POTASSIUM CHLORIDE, SODIUM LACTATE AND CALCIUM CHLORIDE: 600; 310; 30; 20 INJECTION, SOLUTION INTRAVENOUS at 00:33

## 2024-08-08 RX ADMIN — GABAPENTIN 100 MG: 100 CAPSULE ORAL at 07:35

## 2024-08-08 RX ADMIN — SUCRALFATE 1 G: 1 TABLET ORAL at 16:35

## 2024-08-08 RX ADMIN — ENTACAPONE 200 MG: 200 TABLET, FILM COATED ORAL at 16:35

## 2024-08-08 ASSESSMENT — PAIN DESCRIPTION - ORIENTATION: ORIENTATION: LEFT;UPPER

## 2024-08-08 ASSESSMENT — PAIN DESCRIPTION - ONSET: ONSET: ON-GOING

## 2024-08-08 ASSESSMENT — PAIN SCALES - GENERAL
PAINLEVEL_OUTOF10: 0
PAINLEVEL_OUTOF10: 1
PAINLEVEL_OUTOF10: 0
PAINLEVEL_OUTOF10: 0

## 2024-08-08 ASSESSMENT — PAIN - FUNCTIONAL ASSESSMENT: PAIN_FUNCTIONAL_ASSESSMENT: PREVENTS OR INTERFERES SOME ACTIVE ACTIVITIES AND ADLS

## 2024-08-08 ASSESSMENT — PAIN DESCRIPTION - PAIN TYPE: TYPE: SURGICAL PAIN

## 2024-08-08 ASSESSMENT — PAIN DESCRIPTION - FREQUENCY: FREQUENCY: INTERMITTENT

## 2024-08-08 ASSESSMENT — PAIN DESCRIPTION - DESCRIPTORS: DESCRIPTORS: DISCOMFORT

## 2024-08-08 NOTE — ADT AUTH CERT
Please disregard submission moments ago, member's [facility]. Requested NRD 8/8    Thanks,    Rodolfo Burden CRCR  Bedded Insurance Authorizations  Phone   Fax

## 2024-08-08 NOTE — TRANSITION OF CARE
Critical care team - Resident sign-out to medicine service      Date and time: 8/8/2024 5:36 PM  Patient's name:  Mina Gill  Medical Record Number: [id number]  Patient's account/billing number: [id number]  Patient's YOB: 1940  Age: 84 y.o.  Date of Admission: 8/3/2024  6:27 AM  Length of stay during current admission: 5    Primary Care Physician: Tisha Glasgow MD    Code Status: Full Code    Mode of physician to physician communication:        [x] Via telephone   [] In person     Date and time of sign-out: 8/8/2024 5:36 PM    Accepting Internal Medicine resident: Dr. Brady Quiñones     Accepting Medicine team: IM Team 1    Accepting team's attending: Dr. Borrero     Patient's current ICU Bed:  3021    Patient's assigned bed on floor:  315        [x] Med-Surg Monitored [] Step-down       [] Psychiatry ICU       [] Psych floor     Reason for ICU admission:     Complete heart block with temporary pacemaker     ICU course summary:   85 yo male with diastolic CHF, diabetes, CAD with LAD stent 2018, first degree block and incomplete R bundle branch block with advanced Parkinson's disease and esophagitis. Presented with chest pain, SOB, fatigue for 3 days     EKG in ED showed complete heart block, given atropine with no effect and taken for emergent pacemaker this morning. Afterwards was functioning appropriately at a HR of 60. PPM placed on 8/7.      CXR showing multiple bilateral infiltrates R > L. Given rocephin and azithromycin, and flagyl for possible aspiration pneumonia. ID saw him, only on Rocephin until 8/10.      Neurology suggesting SLP for parkinson's with swallowing and aspiration risk, otherwise melatonin for REM sleep disorder from parkinson's. Continue home medication for Parkinson's. Swallow study done 8/8, negative for aspiration. NG removed, diet advanced.     Patient noted to have TAMARA in the emergency department as well.  Baseline creatinine 1.3 and in the emergency department

## 2024-08-08 NOTE — PROCEDURES
INSTRUMENTAL SWALLOW REPORT  MODIFIED BARIUM SWALLOW    NAME: Mina Gill   : 1940  MRN: [id number]       Date of Eval: 2024              Referring Diagnosis(es):      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 (HCC), CKD (chronic kidney disease) stage 2, GFR 60-89 ml/min, CKD (chronic kidney disease) stage 3, GFR 30-59 ml/min (HCC), Diverticulosis of colon, GERD (gastroesophageal reflux disease), History of non-ST elevation myocardial infarction (NSTEMI) 2022, Impaired renal function, MRSA (methicillin resistant staph aureus) culture positive, Osteoarthritis of knee, Parkinson disease (HCC), Proteinuria, Skull fracture (HCC), Temporary loss of eyesight, and Tubular adenoma of colon.  Past Surgical History:  has a past surgical history that includes Colonoscopy (2012); shoulder surgery (Right); pr colsc flx w/rmvl of tumor polyp lesion snare tq (N/A, 2017); Colonoscopy (2017); Cholecystectomy, laparoscopic (N/A, 10/29/2022); Esophagogastroduodenoscopy (2023); Upper gastrointestinal endoscopy (N/A, 2023); Upper gastrointestinal endoscopy (N/A, 2023); Cardiac procedure (N/A, 2024); and Cardiac procedure (N/A, 8/3/2024).      Type of Study: Initial MBS      Patient Complaints/Reason for Referral:  Mina Gill was referred for a MBS to assess the efficiency of his/her swallow function, assess for aspiration, and to make recommendations regarding safe dietary consistencies, effective compensatory strategies, and safe eating environment.       Onset of problem:      Mina Gill is a 83 y.o. with past medical history of diastolic CHF, type 2 diabetes, CAD with LAD stent , pulmonary hypertension, first-degree AV block, incomplete right heart block, advanced Parkinson's disease and esophagitis in the past presents with chest pain, shortness of breath and fatigue for the past 3 days.     On admission

## 2024-08-09 PROCEDURE — 92526 ORAL FUNCTION THERAPY: CPT

## 2024-08-09 PROCEDURE — 97163 PT EVAL HIGH COMPLEX 45 MIN: CPT

## 2024-08-09 PROCEDURE — 97167 OT EVAL HIGH COMPLEX 60 MIN: CPT

## 2024-08-09 PROCEDURE — 99233 SBSQ HOSP IP/OBS HIGH 50: CPT | Performed by: NURSE PRACTITIONER

## 2024-08-09 PROCEDURE — 1200000000 HC SEMI PRIVATE

## 2024-08-09 PROCEDURE — 6370000000 HC RX 637 (ALT 250 FOR IP): Performed by: PSYCHIATRY & NEUROLOGY

## 2024-08-09 PROCEDURE — 2580000003 HC RX 258

## 2024-08-09 PROCEDURE — 99232 SBSQ HOSP IP/OBS MODERATE 35: CPT | Performed by: INTERNAL MEDICINE

## 2024-08-09 PROCEDURE — 6360000002 HC RX W HCPCS

## 2024-08-09 PROCEDURE — 99233 SBSQ HOSP IP/OBS HIGH 50: CPT | Performed by: INTERNAL MEDICINE

## 2024-08-09 PROCEDURE — 97535 SELF CARE MNGMENT TRAINING: CPT

## 2024-08-09 PROCEDURE — 97530 THERAPEUTIC ACTIVITIES: CPT

## 2024-08-09 PROCEDURE — 6370000000 HC RX 637 (ALT 250 FOR IP)

## 2024-08-09 PROCEDURE — 2580000003 HC RX 258: Performed by: INTERNAL MEDICINE

## 2024-08-09 RX ORDER — PANTOPRAZOLE SODIUM 40 MG/1
40 TABLET, DELAYED RELEASE ORAL
Status: DISCONTINUED | OUTPATIENT
Start: 2024-08-09 | End: 2024-08-14 | Stop reason: HOSPADM

## 2024-08-09 RX ADMIN — ENTACAPONE 200 MG: 200 TABLET, FILM COATED ORAL at 17:02

## 2024-08-09 RX ADMIN — SUCRALFATE 1 G: 1 TABLET ORAL at 00:15

## 2024-08-09 RX ADMIN — SODIUM CHLORIDE, PRESERVATIVE FREE 10 ML: 5 INJECTION INTRAVENOUS at 20:46

## 2024-08-09 RX ADMIN — Medication 3 MG: at 20:45

## 2024-08-09 RX ADMIN — ACETAMINOPHEN 650 MG: 325 TABLET ORAL at 15:59

## 2024-08-09 RX ADMIN — SODIUM CHLORIDE, PRESERVATIVE FREE 10 ML: 5 INJECTION INTRAVENOUS at 08:26

## 2024-08-09 RX ADMIN — ENTACAPONE 200 MG: 200 TABLET, FILM COATED ORAL at 12:59

## 2024-08-09 RX ADMIN — CARBIDOPA AND LEVODOPA 1 TABLET: 25; 100 TABLET ORAL at 09:28

## 2024-08-09 RX ADMIN — CARBIDOPA AND LEVODOPA 1 TABLET: 25; 100 TABLET ORAL at 12:59

## 2024-08-09 RX ADMIN — AMITRIPTYLINE HYDROCHLORIDE 10 MG: 10 TABLET, FILM COATED ORAL at 20:45

## 2024-08-09 RX ADMIN — CARBIDOPA AND LEVODOPA 1 TABLET: 25; 100 TABLET ORAL at 17:02

## 2024-08-09 RX ADMIN — Medication 1000 MG: at 08:25

## 2024-08-09 RX ADMIN — ROPINIROLE HYDROCHLORIDE 0.5 MG: 0.5 TABLET, FILM COATED ORAL at 20:46

## 2024-08-09 RX ADMIN — SODIUM CHLORIDE, PRESERVATIVE FREE 40 MG: 5 INJECTION INTRAVENOUS at 08:25

## 2024-08-09 RX ADMIN — ENTACAPONE 200 MG: 200 TABLET, FILM COATED ORAL at 20:46

## 2024-08-09 RX ADMIN — HEPARIN SODIUM 5000 UNITS: 5000 INJECTION INTRAVENOUS; SUBCUTANEOUS at 15:05

## 2024-08-09 RX ADMIN — ENTACAPONE 200 MG: 200 TABLET, FILM COATED ORAL at 09:27

## 2024-08-09 RX ADMIN — GABAPENTIN 100 MG: 100 CAPSULE ORAL at 09:28

## 2024-08-09 RX ADMIN — SUCRALFATE 1 G: 1 TABLET ORAL at 17:02

## 2024-08-09 RX ADMIN — FUROSEMIDE 40 MG: 40 TABLET ORAL at 09:28

## 2024-08-09 RX ADMIN — CARBIDOPA AND LEVODOPA 1 TABLET: 25; 100 TABLET ORAL at 20:46

## 2024-08-09 RX ADMIN — SUCRALFATE 1 G: 1 TABLET ORAL at 12:59

## 2024-08-09 RX ADMIN — ROPINIROLE HYDROCHLORIDE 0.5 MG: 0.5 TABLET, FILM COATED ORAL at 09:27

## 2024-08-09 RX ADMIN — HEPARIN SODIUM 5000 UNITS: 5000 INJECTION INTRAVENOUS; SUBCUTANEOUS at 20:46

## 2024-08-09 RX ADMIN — ROPINIROLE HYDROCHLORIDE 0.5 MG: 0.5 TABLET, FILM COATED ORAL at 12:59

## 2024-08-09 ASSESSMENT — PAIN SCALES - GENERAL
PAINLEVEL_OUTOF10: 3
PAINLEVEL_OUTOF10: 5
PAINLEVEL_OUTOF10: 3

## 2024-08-09 ASSESSMENT — PAIN DESCRIPTION - DESCRIPTORS: DESCRIPTORS: ACHING

## 2024-08-09 ASSESSMENT — PAIN DESCRIPTION - ORIENTATION: ORIENTATION: LEFT

## 2024-08-09 ASSESSMENT — PAIN DESCRIPTION - LOCATION: LOCATION: ARM

## 2024-08-10 LAB
ANION GAP SERPL CALCULATED.3IONS-SCNC: 12 MMOL/L (ref 9–16)
BASOPHILS # BLD: 0.04 K/UL (ref 0–0.2)
BASOPHILS NFR BLD: 0 % (ref 0–2)
BUN SERPL-MCNC: 30 MG/DL (ref 8–23)
CALCIUM SERPL-MCNC: 9.3 MG/DL (ref 8.6–10.4)
CHLORIDE SERPL-SCNC: 99 MMOL/L (ref 98–107)
CO2 SERPL-SCNC: 24 MMOL/L (ref 20–31)
CREAT SERPL-MCNC: 1.3 MG/DL (ref 0.7–1.2)
EOSINOPHIL # BLD: 0.3 K/UL (ref 0–0.44)
EOSINOPHILS RELATIVE PERCENT: 3 % (ref 1–4)
ERYTHROCYTE [DISTWIDTH] IN BLOOD BY AUTOMATED COUNT: 13.5 % (ref 11.8–14.4)
GFR, ESTIMATED: 52 ML/MIN/1.73M2
GLUCOSE SERPL-MCNC: 128 MG/DL (ref 74–99)
HCT VFR BLD AUTO: 46.7 % (ref 40.7–50.3)
HGB BLD-MCNC: 16.6 G/DL (ref 13–17)
IMM GRANULOCYTES # BLD AUTO: 0.11 K/UL (ref 0–0.3)
IMM GRANULOCYTES NFR BLD: 1 %
LYMPHOCYTES NFR BLD: 1.3 K/UL (ref 1.1–3.7)
LYMPHOCYTES RELATIVE PERCENT: 14 % (ref 24–43)
MCH RBC QN AUTO: 33.1 PG (ref 25.2–33.5)
MCHC RBC AUTO-ENTMCNC: 35.5 G/DL (ref 28.4–34.8)
MCV RBC AUTO: 93.2 FL (ref 82.6–102.9)
MONOCYTES NFR BLD: 0.57 K/UL (ref 0.1–1.2)
MONOCYTES NFR BLD: 6 % (ref 3–12)
NEUTROPHILS NFR BLD: 75 % (ref 36–65)
NEUTS SEG NFR BLD: 7.07 K/UL (ref 1.5–8.1)
NRBC BLD-RTO: 0 PER 100 WBC
PLATELET # BLD AUTO: 192 K/UL (ref 138–453)
PMV BLD AUTO: 9.3 FL (ref 8.1–13.5)
POTASSIUM SERPL-SCNC: 4 MMOL/L (ref 3.7–5.3)
RBC # BLD AUTO: 5.01 M/UL (ref 4.21–5.77)
SODIUM SERPL-SCNC: 135 MMOL/L (ref 136–145)
WBC OTHER # BLD: 9.4 K/UL (ref 3.5–11.3)

## 2024-08-10 PROCEDURE — 6370000000 HC RX 637 (ALT 250 FOR IP)

## 2024-08-10 PROCEDURE — 99232 SBSQ HOSP IP/OBS MODERATE 35: CPT | Performed by: STUDENT IN AN ORGANIZED HEALTH CARE EDUCATION/TRAINING PROGRAM

## 2024-08-10 PROCEDURE — 2580000003 HC RX 258

## 2024-08-10 PROCEDURE — 2580000003 HC RX 258: Performed by: INTERNAL MEDICINE

## 2024-08-10 PROCEDURE — 85025 COMPLETE CBC W/AUTO DIFF WBC: CPT

## 2024-08-10 PROCEDURE — 6370000000 HC RX 637 (ALT 250 FOR IP): Performed by: PSYCHIATRY & NEUROLOGY

## 2024-08-10 PROCEDURE — 36415 COLL VENOUS BLD VENIPUNCTURE: CPT

## 2024-08-10 PROCEDURE — 2580000003 HC RX 258: Performed by: STUDENT IN AN ORGANIZED HEALTH CARE EDUCATION/TRAINING PROGRAM

## 2024-08-10 PROCEDURE — 6360000002 HC RX W HCPCS

## 2024-08-10 PROCEDURE — 80048 BASIC METABOLIC PNL TOTAL CA: CPT

## 2024-08-10 PROCEDURE — 6370000000 HC RX 637 (ALT 250 FOR IP): Performed by: INTERNAL MEDICINE

## 2024-08-10 PROCEDURE — 1200000000 HC SEMI PRIVATE

## 2024-08-10 PROCEDURE — 99232 SBSQ HOSP IP/OBS MODERATE 35: CPT | Performed by: INTERNAL MEDICINE

## 2024-08-10 RX ADMIN — POLYETHYLENE GLYCOL 3350 17 G: 17 POWDER, FOR SOLUTION ORAL at 03:56

## 2024-08-10 RX ADMIN — ACETAMINOPHEN 650 MG: 325 TABLET ORAL at 00:34

## 2024-08-10 RX ADMIN — SUCRALFATE 1 G: 1 TABLET ORAL at 05:40

## 2024-08-10 RX ADMIN — ACETAMINOPHEN 650 MG: 325 TABLET ORAL at 09:58

## 2024-08-10 RX ADMIN — HEPARIN SODIUM 5000 UNITS: 5000 INJECTION INTRAVENOUS; SUBCUTANEOUS at 15:25

## 2024-08-10 RX ADMIN — ROPINIROLE HYDROCHLORIDE 0.5 MG: 0.5 TABLET, FILM COATED ORAL at 15:25

## 2024-08-10 RX ADMIN — SUCRALFATE 1 G: 1 TABLET ORAL at 18:37

## 2024-08-10 RX ADMIN — SUCRALFATE 1 G: 1 TABLET ORAL at 13:20

## 2024-08-10 RX ADMIN — Medication 3 MG: at 21:36

## 2024-08-10 RX ADMIN — ENTACAPONE 200 MG: 200 TABLET, FILM COATED ORAL at 21:36

## 2024-08-10 RX ADMIN — SODIUM CHLORIDE, PRESERVATIVE FREE 10 ML: 5 INJECTION INTRAVENOUS at 09:58

## 2024-08-10 RX ADMIN — PANTOPRAZOLE SODIUM 40 MG: 40 TABLET, DELAYED RELEASE ORAL at 05:40

## 2024-08-10 RX ADMIN — ROPINIROLE HYDROCHLORIDE 0.5 MG: 0.5 TABLET, FILM COATED ORAL at 21:36

## 2024-08-10 RX ADMIN — HEPARIN SODIUM 5000 UNITS: 5000 INJECTION INTRAVENOUS; SUBCUTANEOUS at 21:36

## 2024-08-10 RX ADMIN — CARBIDOPA AND LEVODOPA 1 TABLET: 25; 100 TABLET ORAL at 09:57

## 2024-08-10 RX ADMIN — HEPARIN SODIUM 5000 UNITS: 5000 INJECTION INTRAVENOUS; SUBCUTANEOUS at 05:40

## 2024-08-10 RX ADMIN — GABAPENTIN 100 MG: 100 CAPSULE ORAL at 09:57

## 2024-08-10 RX ADMIN — ROPINIROLE HYDROCHLORIDE 0.5 MG: 0.5 TABLET, FILM COATED ORAL at 09:57

## 2024-08-10 RX ADMIN — ENTACAPONE 200 MG: 200 TABLET, FILM COATED ORAL at 13:20

## 2024-08-10 RX ADMIN — CARBIDOPA AND LEVODOPA 1 TABLET: 25; 100 TABLET ORAL at 21:36

## 2024-08-10 RX ADMIN — SODIUM CHLORIDE, PRESERVATIVE FREE 10 ML: 5 INJECTION INTRAVENOUS at 09:59

## 2024-08-10 RX ADMIN — SUCRALFATE 1 G: 1 TABLET ORAL at 00:34

## 2024-08-10 RX ADMIN — SODIUM CHLORIDE, PRESERVATIVE FREE 10 ML: 5 INJECTION INTRAVENOUS at 21:52

## 2024-08-10 RX ADMIN — FUROSEMIDE 40 MG: 40 TABLET ORAL at 09:57

## 2024-08-10 RX ADMIN — CARBIDOPA AND LEVODOPA 1 TABLET: 25; 100 TABLET ORAL at 13:20

## 2024-08-10 RX ADMIN — CARBIDOPA AND LEVODOPA 1 TABLET: 25; 100 TABLET ORAL at 18:37

## 2024-08-10 RX ADMIN — ENTACAPONE 200 MG: 200 TABLET, FILM COATED ORAL at 09:57

## 2024-08-10 RX ADMIN — Medication 1000 MG: at 09:58

## 2024-08-10 RX ADMIN — ENTACAPONE 200 MG: 200 TABLET, FILM COATED ORAL at 18:37

## 2024-08-10 RX ADMIN — AMITRIPTYLINE HYDROCHLORIDE 10 MG: 10 TABLET, FILM COATED ORAL at 21:36

## 2024-08-10 ASSESSMENT — PAIN SCALES - GENERAL
PAINLEVEL_OUTOF10: 1
PAINLEVEL_OUTOF10: 4
PAINLEVEL_OUTOF10: 5
PAINLEVEL_OUTOF10: 3

## 2024-08-10 ASSESSMENT — PAIN DESCRIPTION - LOCATION: LOCATION: HEAD

## 2024-08-10 ASSESSMENT — PAIN DESCRIPTION - DESCRIPTORS: DESCRIPTORS: ACHING

## 2024-08-10 NOTE — CARE COORDINATION
Transitional Planning:  spoke w/ Dr Quiñones about discharge plan. She is asking about plan, because notes states is going home w/ HHC and therapy is recommending inpatient rehab. Family friends here to visit when arrived. Patient is agreeable to whatever is the best. Patient lives in a rooming house and has a privately paid caregiver that stays w/ him 24/7 and she agrees would benefit from inpatient rehab. Explained ARU vs SNF to daughter and caregiver Lora.                        Post Acute Facility/Agency List     Provided patient with the following list, the list includes the overall star ratings obtained from CMS per the Medicare Web site (www.Medicare.gov):     [] Long Term Acute Care Facilities  [x] Acute Inpatient Rehabilitation Facilities  [x] Skilled Nursing Facilities  [] Hospice Facilities  [] Home Care    Provided verbal instructions on how to utilize the QR Code to obtain additional detailed star ratings from www.Medicare.gov     offered to print and provide the detailed list:    []Accepted   [x]Declined    The following choices made: ARU 1) [facility]  SNF: 1) [facility] 2) [facility] 3) [facility]. Referrals sent to all 3 facilities.    4:30 Call from Madison at [facility] and can accept patient and will start precert on Monday. Patient made aware.

## 2024-08-11 PROBLEM — J96.01 ACUTE HYPOXIC RESPIRATORY FAILURE (HCC): Status: RESOLVED | Noted: 2024-08-03 | Resolved: 2024-08-11

## 2024-08-11 PROBLEM — E87.20 METABOLIC ACIDOSIS: Status: RESOLVED | Noted: 2024-08-03 | Resolved: 2024-08-11

## 2024-08-11 PROBLEM — R00.1 BRADYCARDIA: Status: RESOLVED | Noted: 2021-09-01 | Resolved: 2024-08-11

## 2024-08-11 PROCEDURE — 2580000003 HC RX 258

## 2024-08-11 PROCEDURE — 1200000000 HC SEMI PRIVATE

## 2024-08-11 PROCEDURE — 6360000002 HC RX W HCPCS

## 2024-08-11 PROCEDURE — 99232 SBSQ HOSP IP/OBS MODERATE 35: CPT | Performed by: INTERNAL MEDICINE

## 2024-08-11 PROCEDURE — 99232 SBSQ HOSP IP/OBS MODERATE 35: CPT | Performed by: STUDENT IN AN ORGANIZED HEALTH CARE EDUCATION/TRAINING PROGRAM

## 2024-08-11 PROCEDURE — 2580000003 HC RX 258: Performed by: INTERNAL MEDICINE

## 2024-08-11 PROCEDURE — 6370000000 HC RX 637 (ALT 250 FOR IP): Performed by: INTERNAL MEDICINE

## 2024-08-11 PROCEDURE — 6370000000 HC RX 637 (ALT 250 FOR IP): Performed by: PSYCHIATRY & NEUROLOGY

## 2024-08-11 PROCEDURE — 6370000000 HC RX 637 (ALT 250 FOR IP)

## 2024-08-11 RX ORDER — OXYCODONE HYDROCHLORIDE 5 MG/1
5 TABLET ORAL ONCE
Status: COMPLETED | OUTPATIENT
Start: 2024-08-11 | End: 2024-08-11

## 2024-08-11 RX ORDER — FUROSEMIDE 40 MG/1
40 TABLET ORAL DAILY
Qty: 60 TABLET | Refills: 3 | Status: SHIPPED | OUTPATIENT
Start: 2024-08-12

## 2024-08-11 RX ADMIN — CARBIDOPA AND LEVODOPA 1 TABLET: 25; 100 TABLET ORAL at 13:42

## 2024-08-11 RX ADMIN — FUROSEMIDE 40 MG: 40 TABLET ORAL at 09:45

## 2024-08-11 RX ADMIN — SUCRALFATE 1 G: 1 TABLET ORAL at 18:24

## 2024-08-11 RX ADMIN — SUCRALFATE 1 G: 1 TABLET ORAL at 13:42

## 2024-08-11 RX ADMIN — OXYCODONE HYDROCHLORIDE 5 MG: 5 TABLET ORAL at 09:45

## 2024-08-11 RX ADMIN — ENTACAPONE 200 MG: 200 TABLET, FILM COATED ORAL at 09:45

## 2024-08-11 RX ADMIN — ENTACAPONE 200 MG: 200 TABLET, FILM COATED ORAL at 18:24

## 2024-08-11 RX ADMIN — Medication 3 MG: at 21:16

## 2024-08-11 RX ADMIN — SODIUM CHLORIDE, PRESERVATIVE FREE 10 ML: 5 INJECTION INTRAVENOUS at 09:46

## 2024-08-11 RX ADMIN — AMITRIPTYLINE HYDROCHLORIDE 10 MG: 10 TABLET, FILM COATED ORAL at 21:16

## 2024-08-11 RX ADMIN — ROPINIROLE HYDROCHLORIDE 0.5 MG: 0.5 TABLET, FILM COATED ORAL at 13:42

## 2024-08-11 RX ADMIN — SUCRALFATE 1 G: 1 TABLET ORAL at 06:06

## 2024-08-11 RX ADMIN — PANTOPRAZOLE SODIUM 40 MG: 40 TABLET, DELAYED RELEASE ORAL at 06:06

## 2024-08-11 RX ADMIN — ENTACAPONE 200 MG: 200 TABLET, FILM COATED ORAL at 21:16

## 2024-08-11 RX ADMIN — ENTACAPONE 200 MG: 200 TABLET, FILM COATED ORAL at 13:42

## 2024-08-11 RX ADMIN — HEPARIN SODIUM 5000 UNITS: 5000 INJECTION INTRAVENOUS; SUBCUTANEOUS at 21:16

## 2024-08-11 RX ADMIN — SODIUM CHLORIDE, PRESERVATIVE FREE 10 ML: 5 INJECTION INTRAVENOUS at 21:17

## 2024-08-11 RX ADMIN — ROPINIROLE HYDROCHLORIDE 0.5 MG: 0.5 TABLET, FILM COATED ORAL at 21:16

## 2024-08-11 RX ADMIN — CARBIDOPA AND LEVODOPA 1 TABLET: 25; 100 TABLET ORAL at 18:24

## 2024-08-11 RX ADMIN — HEPARIN SODIUM 5000 UNITS: 5000 INJECTION INTRAVENOUS; SUBCUTANEOUS at 13:44

## 2024-08-11 RX ADMIN — CARBIDOPA AND LEVODOPA 1 TABLET: 25; 100 TABLET ORAL at 21:16

## 2024-08-11 RX ADMIN — CARBIDOPA AND LEVODOPA 1 TABLET: 25; 100 TABLET ORAL at 10:31

## 2024-08-11 RX ADMIN — SODIUM CHLORIDE, PRESERVATIVE FREE 10 ML: 5 INJECTION INTRAVENOUS at 21:20

## 2024-08-11 RX ADMIN — GABAPENTIN 100 MG: 100 CAPSULE ORAL at 09:45

## 2024-08-11 RX ADMIN — HEPARIN SODIUM 5000 UNITS: 5000 INJECTION INTRAVENOUS; SUBCUTANEOUS at 06:05

## 2024-08-11 RX ADMIN — ACETAMINOPHEN 650 MG: 325 TABLET ORAL at 07:00

## 2024-08-11 RX ADMIN — ROPINIROLE HYDROCHLORIDE 0.5 MG: 0.5 TABLET, FILM COATED ORAL at 09:45

## 2024-08-11 ASSESSMENT — PAIN SCALES - GENERAL
PAINLEVEL_OUTOF10: 5
PAINLEVEL_OUTOF10: 6

## 2024-08-11 ASSESSMENT — PAIN DESCRIPTION - DESCRIPTORS: DESCRIPTORS: ACHING

## 2024-08-11 ASSESSMENT — PAIN DESCRIPTION - LOCATION: LOCATION: HEAD

## 2024-08-11 NOTE — CARE COORDINATION
Calls from Melva at [facility] and Kala at [facility] that can accept if does not go to ARU. Patient made aware.

## 2024-08-11 NOTE — DISCHARGE INSTR - COC
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    Diaphragmatic hernia without obstruction or gangrene K44.9    Diverticulosis of intestine, part unspecified, without perforation or abscess without bleeding K57.90    Hypertensive heart and chronic kidney disease with heart failure and stage 1 through stage 4 chronic kidney disease, or unspecified chronic kidney disease (HCC) I13.0    Renal insufficiency syndrome N28.9    Other retention of urine R33.8    Other specific arthropathies, not elsewhere classified, other specified site M12.88    Other specified diseases of pancreas K86.89    Presence of coronary angioplasty implant and graft Z95.5    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    Type 2 diabetes mellitus with hyperglycemia (HCC) E11.65    Primary osteoarthritis of left knee M17.12    Unspecified diastolic (congestive) heart failure (HCC) I50.30    Primary osteoarthritis of both knees M17.0    Abnormal stress test R94.39    Progressive angina (HCC) I20.0    Atypical chest pain R07.89    ACS (acute coronary syndrome) (HCC) I24.9    Complete heart block (HCC) I44.2    Pneumonia of right lung due to infectious organism J18.9    Acute kidney injury superimposed on CKD (HCC) N17.9, N18.9    Acute heart failure (HCC) I50.9    Biventricular congestive heart failure (HCC) I50.82    Heart block AV complete (HCC) I44.2       Isolation/Infection:   Isolation            Contact

## 2024-08-11 NOTE — DISCHARGE INSTRUCTIONS
You came in with chest pain and low heart rate.  You were found to be in complete heart block and underwent permanent pacemaker placement.  Additionally, during the hospitalization you developed aspiration pneumonia and was treated with antibiotics.  Additionally, due to concern for aspiration,diet was changed to puréed diet.  You will benefit from continued speech therapy once you are discharge,.  -Please start taking 1 tablet of Lasix 40 mg daily.  -Continue taking rest of your medications as such  -Please get blood work done in 1 week.  -Please follow-up with your PCP within 5 to 7 days.  -Please follow-up with cardiology as listed for wound check.  -Please follow-up with pulmonology, infectious diseases, nephrology, neurology as listed.    If you begin to experience any symptoms such as chest pain shortness of breath nausea vomiting dizziness drowsiness abdominal pain loss of consciousness or any other symptoms you find concerning please come to the ED for follow-up evaluation.    If you have been given medication please take them as prescribed. Do not take more medication than recommended at any given time.     Please follow-up with your primary care provider within 5 to 7 days for continued care.     Please feel free return to the hospital if your symptoms worsen or any new concerning symptoms develop.  Follow-up with your primary care physician as needed for all other the concerns.

## 2024-08-12 PROBLEM — A41.9 SEPTICEMIA (HCC): Status: ACTIVE | Noted: 2024-08-12

## 2024-08-12 PROBLEM — I44.0 1ST DEGREE AV BLOCK: Status: ACTIVE | Noted: 2024-08-12

## 2024-08-12 PROBLEM — E87.70 HYPERVOLEMIA: Status: ACTIVE | Noted: 2022-11-01

## 2024-08-12 PROCEDURE — 97530 THERAPEUTIC ACTIVITIES: CPT

## 2024-08-12 PROCEDURE — 6370000000 HC RX 637 (ALT 250 FOR IP): Performed by: PSYCHIATRY & NEUROLOGY

## 2024-08-12 PROCEDURE — 1200000000 HC SEMI PRIVATE

## 2024-08-12 PROCEDURE — 6370000000 HC RX 637 (ALT 250 FOR IP): Performed by: INTERNAL MEDICINE

## 2024-08-12 PROCEDURE — 97110 THERAPEUTIC EXERCISES: CPT

## 2024-08-12 PROCEDURE — 97535 SELF CARE MNGMENT TRAINING: CPT

## 2024-08-12 PROCEDURE — 2580000003 HC RX 258: Performed by: INTERNAL MEDICINE

## 2024-08-12 PROCEDURE — 99222 1ST HOSP IP/OBS MODERATE 55: CPT | Performed by: PHYSICAL MEDICINE & REHABILITATION

## 2024-08-12 PROCEDURE — 99232 SBSQ HOSP IP/OBS MODERATE 35: CPT | Performed by: INTERNAL MEDICINE

## 2024-08-12 PROCEDURE — 2580000003 HC RX 258

## 2024-08-12 PROCEDURE — 2580000003 HC RX 258: Performed by: STUDENT IN AN ORGANIZED HEALTH CARE EDUCATION/TRAINING PROGRAM

## 2024-08-12 PROCEDURE — 99233 SBSQ HOSP IP/OBS HIGH 50: CPT | Performed by: HOSPITALIST

## 2024-08-12 PROCEDURE — 6370000000 HC RX 637 (ALT 250 FOR IP)

## 2024-08-12 PROCEDURE — 6360000002 HC RX W HCPCS

## 2024-08-12 RX ADMIN — ROPINIROLE HYDROCHLORIDE 0.5 MG: 0.5 TABLET, FILM COATED ORAL at 22:13

## 2024-08-12 RX ADMIN — SUCRALFATE 1 G: 1 TABLET ORAL at 22:13

## 2024-08-12 RX ADMIN — CARBIDOPA AND LEVODOPA 1 TABLET: 25; 100 TABLET ORAL at 17:37

## 2024-08-12 RX ADMIN — ENTACAPONE 200 MG: 200 TABLET, FILM COATED ORAL at 13:55

## 2024-08-12 RX ADMIN — HEPARIN SODIUM 5000 UNITS: 5000 INJECTION INTRAVENOUS; SUBCUTANEOUS at 05:45

## 2024-08-12 RX ADMIN — SODIUM CHLORIDE, PRESERVATIVE FREE 10 ML: 5 INJECTION INTRAVENOUS at 22:15

## 2024-08-12 RX ADMIN — ENTACAPONE 200 MG: 200 TABLET, FILM COATED ORAL at 09:49

## 2024-08-12 RX ADMIN — ENTACAPONE 200 MG: 200 TABLET, FILM COATED ORAL at 22:13

## 2024-08-12 RX ADMIN — SUCRALFATE 1 G: 1 TABLET ORAL at 05:45

## 2024-08-12 RX ADMIN — HEPARIN SODIUM 5000 UNITS: 5000 INJECTION INTRAVENOUS; SUBCUTANEOUS at 13:55

## 2024-08-12 RX ADMIN — ROPINIROLE HYDROCHLORIDE 0.5 MG: 0.5 TABLET, FILM COATED ORAL at 13:55

## 2024-08-12 RX ADMIN — CARBIDOPA AND LEVODOPA 1 TABLET: 25; 100 TABLET ORAL at 10:41

## 2024-08-12 RX ADMIN — CARBIDOPA AND LEVODOPA 1 TABLET: 25; 100 TABLET ORAL at 13:55

## 2024-08-12 RX ADMIN — ENTACAPONE 200 MG: 200 TABLET, FILM COATED ORAL at 17:37

## 2024-08-12 RX ADMIN — ACETAMINOPHEN 650 MG: 325 TABLET ORAL at 22:12

## 2024-08-12 RX ADMIN — AMITRIPTYLINE HYDROCHLORIDE 10 MG: 10 TABLET, FILM COATED ORAL at 22:13

## 2024-08-12 RX ADMIN — CARBIDOPA AND LEVODOPA 1 TABLET: 25; 100 TABLET ORAL at 22:13

## 2024-08-12 RX ADMIN — SODIUM CHLORIDE, PRESERVATIVE FREE 10 ML: 5 INJECTION INTRAVENOUS at 10:41

## 2024-08-12 RX ADMIN — SODIUM CHLORIDE, PRESERVATIVE FREE 10 ML: 5 INJECTION INTRAVENOUS at 22:14

## 2024-08-12 RX ADMIN — Medication 3 MG: at 22:13

## 2024-08-12 RX ADMIN — PANTOPRAZOLE SODIUM 40 MG: 40 TABLET, DELAYED RELEASE ORAL at 07:08

## 2024-08-12 RX ADMIN — ACETAMINOPHEN 650 MG: 325 TABLET ORAL at 07:08

## 2024-08-12 RX ADMIN — SUCRALFATE 1 G: 1 TABLET ORAL at 17:37

## 2024-08-12 RX ADMIN — SUCRALFATE 1 G: 1 TABLET ORAL at 12:20

## 2024-08-12 RX ADMIN — SODIUM CHLORIDE, PRESERVATIVE FREE 10 ML: 5 INJECTION INTRAVENOUS at 22:12

## 2024-08-12 RX ADMIN — GABAPENTIN 100 MG: 100 CAPSULE ORAL at 09:49

## 2024-08-12 RX ADMIN — HEPARIN SODIUM 5000 UNITS: 5000 INJECTION INTRAVENOUS; SUBCUTANEOUS at 22:14

## 2024-08-12 RX ADMIN — ROPINIROLE HYDROCHLORIDE 0.5 MG: 0.5 TABLET, FILM COATED ORAL at 09:49

## 2024-08-12 ASSESSMENT — PAIN SCALES - GENERAL: PAINLEVEL_OUTOF10: 0

## 2024-08-12 NOTE — CONSULTS
NEUROLOGY INPATIENT CONSULT NOTE    8/3/2024         Mina Gill is a  83 y.o. male admitted on 8/3/2024 with  Complete heart block (HCC) [I44.2]  Bradycardia [R00.1]  Septicemia (HCC) [A41.9]  TAMARA (acute kidney injury) (HCC) [N17.9]  1st degree AV block [I44.0]  Pneumonia of right lower lobe due to infectious organism [J18.9]  Hypervolemia, unspecified hypervolemia type [E87.70]    Reason for consult: Parkinson disease  History is obtained mostly from the patient and the medical record and from the caregivers. Chart is reviewed and patient is examined.   Briefly, this is a  83 y.o. male with hx of HTN, CKD, CAD and Parkinson disease was admitted on 8/3/2024 with c/o progressive fatigue and intermittent chest pain and shortness of breath.    He was found to have bradycardia with heart rate in 30s for which he was on atropine and was taken to Cath Lab and has had temporary pacemaker this morning.  Patient and his caregivers stated that he has been having swallowing difficulties and not taking his medications for parkinsonism for the past couple of days.  Patient has been having increased tremors for which neurology is consulted.   This patient is known to neurology service and he has tremor dominant parkinsonism for which he has been on Sinemet 25/100 qid and entacapone 200 mg qid and Requip 0.5 mg tid.  Presently patient is having significant tremors in bilateral upper extremities along with bradykinesia and rigidity and also has voice tremor.  Patient denies headaches but admits to having dizziness.      No current facility-administered medications on file prior to encounter.     Current Outpatient Medications on File Prior to Encounter   Medication Sig Dispense Refill    pantoprazole (PROTONIX) 20 MG tablet Take 1 tablet by mouth daily 90 tablet 0    sucralfate (CARAFATE) 1 GM tablet TAKE ONE (1) TABLET BY MOUTH FOUR (4) TIMES DAILY 120 tablet 0    isosorbide mononitrate (IMDUR) 30 MG extended release tablet 
[facility] - Initial Consult Note  Today's Date and Time: 8/5/2024, 7:35 AM    Impression :   Complete heart block   S/p temporary pacemaker placement on 8/3/24  NSTEMI likely type II  Concern for aspiration PNA vs pneumonitis  Lactic acidosis-resolved  Leukocytosis-resolved  Low grade fever  Acute on chronic CHF   TAMARA on CKD stage III  CAD s/p PCI in 2018  DM II  Pulmonary HTN  Advanced Parkinson's  GERD    Recommendations:   IV Rocephin and Flagyl were initiated on 8/3/24  D/C Flagyl 8/5/24  Completed Azithromycin  Legionella,mycoplasma, strep pneumo and viral respiratory panel were negative  Gentle diuresis  Renal considerations  Cleared for PPM  placement from an ID perspective, at the discretion of Cardiology  Vancomycin 1250 mg IV x 1 for prophylaxis at that time    Medical Decision Making/Summary/Discussion:8/5/2024     Daily Elements of Decision Making provided by Consulting Physician:    Note: I have independently performed the steps listed below as part of the medical decision making and evaluation.    Review of current Problems:  Today I am seeing the patient for the following problems:  Complete heart block   S/p temporary pacemaker placement on 8/3/24  NSTEMI likely type II  Concern for aspiration PNA vs pneumonitis  Lactic acidosis-resolved  Leukocytosis-resolved  Low grade fever  Acute on chronic CHF   TAMARA on CKD stage III  CAD s/p PCI in 2018  DM II  Pulmonary HTN  Advanced Parkinson's  GERD  Evaluation of Patient:  Patient evaluated and examined in the ICU.  Acute on chronic CHF   TAMARA on CKD stage III  Evaluated for fevers, leucocytosis. Concern for aspiration  Please see daily details in Interval Changes Section  Changes in physical exam:  Confusion, tremors  CHF  Complete heart block with temporary pacemaker  Needs permanent pacemaker  Please see daily details in Physical Exam section and in Interval Changes Section  Changes in ROS:  Unchanged  Please see 
Physical Medicine & Rehabilitation  Consult Note      Admitting Physician: Stoney Flannery*    Primary Care Provider: Tisha Glasgow MD     Reason for Consult:  Acute Inpatient Rehabilitation    Chief Complaint: Shortness of breath, chest pain fatigue    History of Present Illness:  Referring Provider is requesting an evaluation for appropriate placement upon discharge from acute care.     Mr. Mina Gill is a 84 y.o. male who was admitted to [facility] on 8/3/2024 with Chest Pain and Shortness of Breath    83-year-old male with history of diastolic CHF, type 2 diabetes, coronary disease with LAD stent 2018, pulmonary hypertension, first-degree AV block, incomplete right heart block advanced Parkinson's disease and esophagitis in the past presents with chest pain shortness of breath.  He was noted to have complete heart block was given atropine and taken for emergent temporary pacemaker found have right lower lobe infiltrate started on Rocephin and azithromycin # history of advanced Parkinson's-consideration of deep brain stimulation also noted to have TAMARA    Cardiology complete heart block status post dual chamber permanent pacemaker 8/7, NSTEMI type II, acute on chronic CHF preserved ejection fraction 54% history of coronary disease status post PCI to LAD    ID right lower lobe aspiration versus pneumonitis on Rocephin through 8/10    Internal medicine dysphagia secondary Parkinson disease on puréed mildly thick diet, [place] is avoid Klonopin    Nephrology TAMARA likely ATN kidney function back to baseline, signed off, like remove M[place] continue tube feeds continue Lasix    Neurology Parkinson's disease resume home meds, dysphagia speech follow signed off    Radiology:  FL MODIFIED BARIUM SWALLOW W VIDEO    Result Date: 8/8/2024  No penetration or aspiration with the above administered substances. Please see separate speech pathology report for full discussion of findings and recommendations.     XR CHEST 
MD   carbidopa-levodopa (SINEMET)  MG per tablet TAKE ONE (1) TABLET BY MOUTH FOUR (4) TIMES DAILY 6/18/24   Jamal Barnes MD   allopurinol (ZYLOPRIM) 100 MG tablet TAKE ONE (1) TABLET BY MOUTH ONCE DAILY FOR GOUT 6/18/24   Jamal Barnes MD   ondansetron (ZOFRAN-ODT) 4 MG disintegrating tablet Take 1 tablet by mouth every 8 hours as needed for Nausea or Vomiting 6/7/24   Julien Nielsen,    rOPINIRole (REQUIP) 0.5 MG tablet TAKE ONE (1) TABLET BY MOUTH THREE (3) TIMES DAILY 5/21/24   Jamal Barnes MD   gabapentin (NEURONTIN) 100 MG capsule Take 1 capsule by mouth daily. 1/4/24   Otto Forman MD   amLODIPine (NORVASC) 5 MG tablet Take 1 tablet by mouth daily 12/19/23   Jamal Barnes MD   clopidogrel (PLAVIX) 75 MG tablet TAKE 1 TABLET BY MOUTH ONCE DAILY -FOR ANTICOAGULANT 12/19/23   Jamal Barnes MD   magnesium oxide (MAG-OX) 400 (240 Mg) MG tablet Take 1 tablet by mouth daily 12/19/23   Jamal Barnes MD   latanoprost (XALATAN) 0.005 % ophthalmic solution Place 1 drop into both eyes daily 9/11/23   Otto Forman MD   atorvastatin (LIPITOR) 40 MG tablet take 1 tablet by mouth once daily 9/11/23   Jenn Pablo APRN - CNP   diclofenac sodium (VOLTAREN) 1 % GEL Apply 4 g topically 4 times daily as needed for Pain 8/31/23   Jenn Pablo APRN - CNP   tamsulosin (FLOMAX) 0.4 MG capsule Take 1 capsule by mouth daily 8/16/23   Jenn Pablo APRN - CNP   nitroGLYCERIN (NITROSTAT) 0.4 MG SL tablet up to max of 3 total doses. If no relief after 1 dose, call 911. 6/9/22   Noemy Hogan APRN - NP       Allergies:  Aspirin and Iodine    Social History:   reports that he has quit smoking. He has never used smokeless tobacco. He reports that he does not drink alcohol and does not use drugs.     Family History: family history includes No Known Problems in his father and mother.     REVIEW OF SYSTEMS:    Constitutional: Negative for fatigue, weight loss, loss of appetite   Cardiovascular: 
urination. No tremor.  Hematologic/Lymphatic: No abnormal bruising or bleeding, blood clots or swollen lymph nodes.  Allergic/Immunologic: No nasal congestion or hives.    PHYSICAL EXAM:    Physical Examination:    /73   Pulse 59   Temp 99.3 °F (37.4 °C)   Resp 22   Ht 1.702 m (5' 7.01\")   Wt 87.7 kg (193 lb 5.5 oz)   SpO2 98%   BMI 30.27 kg/m²    Constitutional and General Appearance: alert, cooperative, no distress and appears stated age  HEENT: PERRL, no cervical lymphadenopathy. No masses palpable. Normal oral mucosa  Respiratory:  Normal excursion and expansion without use of accessory muscles  Resp Auscultation: Good respiratory effort. No for increased work of breathing. On auscultation: clear to auscultation bilaterally  Cardiovascular:  The apical impulse is not displaced  Heart tones are crisp and normal. regular S1 and S2. Murmurs: None  Jugular venous pulsation Normal  The carotid upstroke is normal in amplitude and contour without delay or bruit  Peripheral pulses are symmetrical and full   Abdomen:  No masses or tenderness  Bowel sounds present  Extremities:   No Cyanosis or Clubbing   Lower extremity edema: trace   Skin: Warm and dry  Neurological:  Alert and oriented.  Moves all extremities well  No abnormalities of mood, affect, memory, mentation, or behavior are noted    DATA:    Diagnostics:      EKG:  Sinus rhythm with 3rd degree AV block; junctional escape at 38 bpm; incomplete RBBB and left axis deviation    2D ECHO ( 3/18/24)      Left Ventricle: Preserved left ventricular systolic function with a visually estimated EF of 50 - 55%. Left ventricle size is normal. Normal wall thickness. Normal wall motion, no obvious wall motion abnormalites seen. Abnormal diastolic function.    Aortic Valve: Trileaflet valve. Sclerosis of the aortic valve cusp. Mild to moderate regurgitation. AV PHT is 389.5 millisecond.    Tricuspid Valve: The estimated RVSP is 27 mmHg.    Image quality is 
Once     Continuous Infusions:    DOPamine Stopped (24 09)    sodium chloride       PRN Meds:  sodium chloride flush, sodium chloride, ondansetron **OR** ondansetron, polyethylene glycol, acetaminophen **OR** acetaminophen    Review of Systems:     Constitutional: No fever, no chills, generalized fatigue and lethargy.  HEENT:  No headache, otalgia, itchy eyes, nasal discharge or sore throat.  Cardiac:  Close of breath as described above  Chest:  No cough, phlegm or wheezing.  Abdomen:  No abdominal pain, nausea or vomiting.  Neuro:  Involuntary movements as before  Skin:   No rashes, no itching.  :   No hematuria, no pyuria, no dysuria, no flank pain.  Extremities:  No swelling or joint pains.  ROS was otherwise negative except as mentioned in the [place].     Input/Output:       No intake/output data recorded.  Patient Vitals for the past 96 hrs (Last 3 readings):   Weight   24 0940 90.8 kg (200 lb 2.8 oz)   24 0615 81.6 kg (180 lb)      Vital Signs:   Temperature:  Temp: 98.5 °F (36.9 °C)  TMax:   Temp (24hrs), Av.5 °F (36.9 °C), Min:98.5 °F (36.9 °C), Max:98.5 °F (36.9 °C)    Respirations:  Respirations: 21  Pulse:   Pulse: 61  BP:    BP: 130/64  BP Range: Systolic (24hrs), Av , Min:117 , Max:249       Diastolic (24hrs), Av, Min:53, Max:187      Physical Examination:     General:  , Follows commands, speech is not coherent.  Hypophonia noted.  HEENT: Atraumatic, normocephalic, no throat congestion, moist mucosa.  Eyes:   Pupils equal, round and reactive to light, EOMI.  Neck:   No JVD, no thyromegaly, no lymphadenopathy.  Chest:  Bilateral vesicular breath sounds, basal crackles  Cardiac:  S1 S2 RR, no murmurs, gallops or rubs, JVP not raised.  Abdomen: Soft, non-tender, no masses or organomegaly, BS audible.  :   No suprapubic or flank tenderness.  Neuro:  Resting tremor both hands SKIN:  No rashes, good skin turgor.  Extremities:  No edema, palpable peripheral pulses, no calf

## 2024-08-12 NOTE — CARE COORDINATION
Transitional Planning:   Call from [place] at [facility] stating pt is OON and would be responsible for 40%. She will not start precert unless pt willing to pay 40% Out of pocket . Spoke w pt who states he doesn't want to go to [facility] d/t out of pocket expenses. [facility] Referral sent to [facility]   1221- Spoke with Bernadette at [facility] who states they are able to accept and will start precert once updated PT/OT notes in . Called and LM for PT to see pt today. Called OT and spoke with Laureen who states she will make sure pt is seen today.   Spoke with pt and friend Lora Stewart at bedside and updated on above, pt in agreement to go and appreciative, awaiting auth from insurance

## 2024-08-13 LAB — PROCALCITONIN SERPL-MCNC: 0.1 NG/ML (ref 0–0.09)

## 2024-08-13 PROCEDURE — 97110 THERAPEUTIC EXERCISES: CPT

## 2024-08-13 PROCEDURE — 1200000000 HC SEMI PRIVATE

## 2024-08-13 PROCEDURE — 6370000000 HC RX 637 (ALT 250 FOR IP)

## 2024-08-13 PROCEDURE — 84145 PROCALCITONIN (PCT): CPT

## 2024-08-13 PROCEDURE — 2580000003 HC RX 258: Performed by: INTERNAL MEDICINE

## 2024-08-13 PROCEDURE — 6370000000 HC RX 637 (ALT 250 FOR IP): Performed by: PSYCHIATRY & NEUROLOGY

## 2024-08-13 PROCEDURE — 2580000003 HC RX 258: Performed by: STUDENT IN AN ORGANIZED HEALTH CARE EDUCATION/TRAINING PROGRAM

## 2024-08-13 PROCEDURE — 2580000003 HC RX 258

## 2024-08-13 PROCEDURE — 6360000002 HC RX W HCPCS

## 2024-08-13 PROCEDURE — 99232 SBSQ HOSP IP/OBS MODERATE 35: CPT | Performed by: HOSPITALIST

## 2024-08-13 PROCEDURE — 97530 THERAPEUTIC ACTIVITIES: CPT

## 2024-08-13 PROCEDURE — 36415 COLL VENOUS BLD VENIPUNCTURE: CPT

## 2024-08-13 PROCEDURE — 92526 ORAL FUNCTION THERAPY: CPT

## 2024-08-13 PROCEDURE — 6370000000 HC RX 637 (ALT 250 FOR IP): Performed by: INTERNAL MEDICINE

## 2024-08-13 RX ADMIN — SUCRALFATE 1 G: 1 TABLET ORAL at 22:33

## 2024-08-13 RX ADMIN — PANTOPRAZOLE SODIUM 40 MG: 40 TABLET, DELAYED RELEASE ORAL at 05:49

## 2024-08-13 RX ADMIN — SODIUM CHLORIDE, PRESERVATIVE FREE 10 ML: 5 INJECTION INTRAVENOUS at 20:13

## 2024-08-13 RX ADMIN — AMITRIPTYLINE HYDROCHLORIDE 10 MG: 10 TABLET, FILM COATED ORAL at 20:12

## 2024-08-13 RX ADMIN — SUCRALFATE 1 G: 1 TABLET ORAL at 05:49

## 2024-08-13 RX ADMIN — SODIUM CHLORIDE, PRESERVATIVE FREE 10 ML: 5 INJECTION INTRAVENOUS at 20:14

## 2024-08-13 RX ADMIN — SODIUM CHLORIDE, PRESERVATIVE FREE 10 ML: 5 INJECTION INTRAVENOUS at 09:39

## 2024-08-13 RX ADMIN — ROPINIROLE HYDROCHLORIDE 0.5 MG: 0.5 TABLET, FILM COATED ORAL at 20:12

## 2024-08-13 RX ADMIN — ENTACAPONE 200 MG: 200 TABLET, FILM COATED ORAL at 20:12

## 2024-08-13 RX ADMIN — ENTACAPONE 200 MG: 200 TABLET, FILM COATED ORAL at 17:59

## 2024-08-13 RX ADMIN — Medication 3 MG: at 20:12

## 2024-08-13 RX ADMIN — FUROSEMIDE 40 MG: 40 TABLET ORAL at 09:38

## 2024-08-13 RX ADMIN — ENTACAPONE 200 MG: 200 TABLET, FILM COATED ORAL at 13:21

## 2024-08-13 RX ADMIN — SUCRALFATE 1 G: 1 TABLET ORAL at 13:22

## 2024-08-13 RX ADMIN — SUCRALFATE 1 G: 1 TABLET ORAL at 17:59

## 2024-08-13 RX ADMIN — SODIUM CHLORIDE, PRESERVATIVE FREE 10 ML: 5 INJECTION INTRAVENOUS at 20:12

## 2024-08-13 RX ADMIN — HEPARIN SODIUM 5000 UNITS: 5000 INJECTION INTRAVENOUS; SUBCUTANEOUS at 20:12

## 2024-08-13 RX ADMIN — HEPARIN SODIUM 5000 UNITS: 5000 INJECTION INTRAVENOUS; SUBCUTANEOUS at 05:49

## 2024-08-13 RX ADMIN — CARBIDOPA AND LEVODOPA 1 TABLET: 25; 100 TABLET ORAL at 17:59

## 2024-08-13 RX ADMIN — GABAPENTIN 100 MG: 100 CAPSULE ORAL at 09:39

## 2024-08-13 RX ADMIN — CARBIDOPA AND LEVODOPA 1 TABLET: 25; 100 TABLET ORAL at 20:13

## 2024-08-13 RX ADMIN — CARBIDOPA AND LEVODOPA 1 TABLET: 25; 100 TABLET ORAL at 09:38

## 2024-08-13 RX ADMIN — HEPARIN SODIUM 5000 UNITS: 5000 INJECTION INTRAVENOUS; SUBCUTANEOUS at 13:22

## 2024-08-13 RX ADMIN — CARBIDOPA AND LEVODOPA 1 TABLET: 25; 100 TABLET ORAL at 13:22

## 2024-08-13 RX ADMIN — ROPINIROLE HYDROCHLORIDE 0.5 MG: 0.5 TABLET, FILM COATED ORAL at 09:38

## 2024-08-13 RX ADMIN — ROPINIROLE HYDROCHLORIDE 0.5 MG: 0.5 TABLET, FILM COATED ORAL at 13:22

## 2024-08-13 RX ADMIN — ENTACAPONE 200 MG: 200 TABLET, FILM COATED ORAL at 09:39

## 2024-08-13 RX ADMIN — ACETAMINOPHEN 650 MG: 325 TABLET ORAL at 13:22

## 2024-08-13 RX ADMIN — SODIUM CHLORIDE, PRESERVATIVE FREE 10 ML: 5 INJECTION INTRAVENOUS at 05:49

## 2024-08-13 ASSESSMENT — PAIN DESCRIPTION - ORIENTATION: ORIENTATION: OUTER

## 2024-08-13 ASSESSMENT — PAIN - FUNCTIONAL ASSESSMENT: PAIN_FUNCTIONAL_ASSESSMENT: ACTIVITIES ARE NOT PREVENTED

## 2024-08-13 ASSESSMENT — PAIN SCALES - GENERAL
PAINLEVEL_OUTOF10: 0
PAINLEVEL_OUTOF10: 0
PAINLEVEL_OUTOF10: 3

## 2024-08-13 ASSESSMENT — PAIN DESCRIPTION - DESCRIPTORS: DESCRIPTORS: ACHING

## 2024-08-13 ASSESSMENT — PAIN DESCRIPTION - LOCATION: LOCATION: HEAD

## 2024-08-13 ASSESSMENT — PAIN SCALES - WONG BAKER: WONGBAKER_NUMERICALRESPONSE: NO HURT

## 2024-08-13 NOTE — CARE COORDINATION
Spoke to Bernadette at [facility].  They have started precert and are awaiting auth.

## 2024-08-14 VITALS
TEMPERATURE: 97.9 F | BODY MASS INDEX: 29.9 KG/M2 | HEIGHT: 67 IN | RESPIRATION RATE: 17 BRPM | WEIGHT: 190.48 LBS | DIASTOLIC BLOOD PRESSURE: 77 MMHG | OXYGEN SATURATION: 94 % | SYSTOLIC BLOOD PRESSURE: 127 MMHG | HEART RATE: 71 BPM

## 2024-08-14 DIAGNOSIS — K20.90 ESOPHAGITIS: ICD-10-CM

## 2024-08-14 DIAGNOSIS — G20.A1 PARKINSON'S DISEASE, UNSPECIFIED WHETHER DYSKINESIA PRESENT, UNSPECIFIED WHETHER MANIFESTATIONS FLUCTUATE (HCC): ICD-10-CM

## 2024-08-14 LAB
ANION GAP SERPL CALCULATED.3IONS-SCNC: 13 MMOL/L (ref 9–16)
BASOPHILS # BLD: 0.04 K/UL (ref 0–0.2)
BASOPHILS NFR BLD: 1 % (ref 0–2)
BUN SERPL-MCNC: 25 MG/DL (ref 8–23)
CALCIUM SERPL-MCNC: 9.7 MG/DL (ref 8.6–10.4)
CHLORIDE SERPL-SCNC: 98 MMOL/L (ref 98–107)
CO2 SERPL-SCNC: 22 MMOL/L (ref 20–31)
CREAT SERPL-MCNC: 1.3 MG/DL (ref 0.7–1.2)
EOSINOPHIL # BLD: 0.39 K/UL (ref 0–0.44)
EOSINOPHILS RELATIVE PERCENT: 5 % (ref 1–4)
ERYTHROCYTE [DISTWIDTH] IN BLOOD BY AUTOMATED COUNT: 13.4 % (ref 11.8–14.4)
GFR, ESTIMATED: 54 ML/MIN/1.73M2
GLUCOSE SERPL-MCNC: 148 MG/DL (ref 74–99)
HCT VFR BLD AUTO: 48.7 % (ref 40.7–50.3)
HGB BLD-MCNC: 16.6 G/DL (ref 13–17)
IMM GRANULOCYTES # BLD AUTO: 0.07 K/UL (ref 0–0.3)
IMM GRANULOCYTES NFR BLD: 1 %
LYMPHOCYTES NFR BLD: 1.54 K/UL (ref 1.1–3.7)
LYMPHOCYTES RELATIVE PERCENT: 19 % (ref 24–43)
MCH RBC QN AUTO: 32.6 PG (ref 25.2–33.5)
MCHC RBC AUTO-ENTMCNC: 34.1 G/DL (ref 28.4–34.8)
MCV RBC AUTO: 95.7 FL (ref 82.6–102.9)
MONOCYTES NFR BLD: 0.54 K/UL (ref 0.1–1.2)
MONOCYTES NFR BLD: 7 % (ref 3–12)
NEUTROPHILS NFR BLD: 67 % (ref 36–65)
NEUTS SEG NFR BLD: 5.58 K/UL (ref 1.5–8.1)
NRBC BLD-RTO: 0 PER 100 WBC
PLATELET # BLD AUTO: 208 K/UL (ref 138–453)
PMV BLD AUTO: 10.3 FL (ref 8.1–13.5)
POTASSIUM SERPL-SCNC: 4.5 MMOL/L (ref 3.7–5.3)
RBC # BLD AUTO: 5.09 M/UL (ref 4.21–5.77)
SODIUM SERPL-SCNC: 133 MMOL/L (ref 136–145)
WBC OTHER # BLD: 8.2 K/UL (ref 3.5–11.3)

## 2024-08-14 PROCEDURE — 85025 COMPLETE CBC W/AUTO DIFF WBC: CPT

## 2024-08-14 PROCEDURE — 36415 COLL VENOUS BLD VENIPUNCTURE: CPT

## 2024-08-14 PROCEDURE — 2580000003 HC RX 258: Performed by: STUDENT IN AN ORGANIZED HEALTH CARE EDUCATION/TRAINING PROGRAM

## 2024-08-14 PROCEDURE — 99232 SBSQ HOSP IP/OBS MODERATE 35: CPT | Performed by: PHYSICAL MEDICINE & REHABILITATION

## 2024-08-14 PROCEDURE — 6370000000 HC RX 637 (ALT 250 FOR IP): Performed by: INTERNAL MEDICINE

## 2024-08-14 PROCEDURE — 2580000003 HC RX 258

## 2024-08-14 PROCEDURE — 99239 HOSP IP/OBS DSCHRG MGMT >30: CPT | Performed by: HOSPITALIST

## 2024-08-14 PROCEDURE — 6370000000 HC RX 637 (ALT 250 FOR IP)

## 2024-08-14 PROCEDURE — 80048 BASIC METABOLIC PNL TOTAL CA: CPT

## 2024-08-14 PROCEDURE — 6360000002 HC RX W HCPCS

## 2024-08-14 RX ORDER — PANTOPRAZOLE SODIUM 20 MG/1
20 TABLET, DELAYED RELEASE ORAL DAILY
Qty: 90 TABLET | Refills: 0 | Status: SHIPPED | OUTPATIENT
Start: 2024-08-14

## 2024-08-14 RX ORDER — ROPINIROLE 0.5 MG/1
0.5 TABLET, FILM COATED ORAL 3 TIMES DAILY
Qty: 90 TABLET | Refills: 1 | Status: SHIPPED | OUTPATIENT
Start: 2024-08-14

## 2024-08-14 RX ORDER — GABAPENTIN 100 MG/1
100 CAPSULE ORAL DAILY
Qty: 30 CAPSULE | Refills: 1 | Status: SHIPPED | OUTPATIENT
Start: 2024-08-14 | End: 2024-10-13

## 2024-08-14 RX ORDER — SUCRALFATE 1 G/1
1 TABLET ORAL 4 TIMES DAILY
Qty: 120 TABLET | Refills: 1 | Status: SHIPPED | OUTPATIENT
Start: 2024-08-14

## 2024-08-14 RX ORDER — CLOPIDOGREL BISULFATE 75 MG/1
TABLET ORAL
Qty: 90 TABLET | Refills: 3 | Status: SHIPPED | OUTPATIENT
Start: 2024-08-14

## 2024-08-14 RX ADMIN — SUCRALFATE 1 G: 1 TABLET ORAL at 05:07

## 2024-08-14 RX ADMIN — ENTACAPONE 200 MG: 200 TABLET, FILM COATED ORAL at 08:44

## 2024-08-14 RX ADMIN — HEPARIN SODIUM 5000 UNITS: 5000 INJECTION INTRAVENOUS; SUBCUTANEOUS at 05:07

## 2024-08-14 RX ADMIN — ROPINIROLE HYDROCHLORIDE 0.5 MG: 0.5 TABLET, FILM COATED ORAL at 08:44

## 2024-08-14 RX ADMIN — CARBIDOPA AND LEVODOPA 1 TABLET: 25; 100 TABLET ORAL at 08:45

## 2024-08-14 RX ADMIN — ENTACAPONE 200 MG: 200 TABLET, FILM COATED ORAL at 13:20

## 2024-08-14 RX ADMIN — CARBIDOPA AND LEVODOPA 1 TABLET: 25; 100 TABLET ORAL at 13:19

## 2024-08-14 RX ADMIN — HEPARIN SODIUM 5000 UNITS: 5000 INJECTION INTRAVENOUS; SUBCUTANEOUS at 14:03

## 2024-08-14 RX ADMIN — SUCRALFATE 1 G: 1 TABLET ORAL at 11:26

## 2024-08-14 RX ADMIN — PANTOPRAZOLE SODIUM 40 MG: 40 TABLET, DELAYED RELEASE ORAL at 05:06

## 2024-08-14 RX ADMIN — FUROSEMIDE 40 MG: 40 TABLET ORAL at 08:44

## 2024-08-14 RX ADMIN — SODIUM CHLORIDE, PRESERVATIVE FREE 10 ML: 5 INJECTION INTRAVENOUS at 08:45

## 2024-08-14 RX ADMIN — ROPINIROLE HYDROCHLORIDE 0.5 MG: 0.5 TABLET, FILM COATED ORAL at 14:03

## 2024-08-14 RX ADMIN — SODIUM CHLORIDE, PRESERVATIVE FREE 10 ML: 5 INJECTION INTRAVENOUS at 08:44

## 2024-08-14 RX ADMIN — GABAPENTIN 100 MG: 100 CAPSULE ORAL at 08:45

## 2024-08-14 RX ADMIN — ACETAMINOPHEN 650 MG: 325 TABLET ORAL at 05:06

## 2024-08-14 ASSESSMENT — PAIN SCALES - GENERAL
PAINLEVEL_OUTOF10: 6
PAINLEVEL_OUTOF10: 2

## 2024-08-14 ASSESSMENT — PAIN SCALES - WONG BAKER: WONGBAKER_NUMERICALRESPONSE: NO HURT

## 2024-08-14 NOTE — CARE COORDINATION
Called patient's son Jhony.  Left voicemail  Called son Praneeth informed him of discharge and transport at  4:00 pm he is agreeable with the plan

## 2024-08-14 NOTE — PROGRESS NOTES
[facility]  Pulmonary progress note  ______________________________________________________________________________    Patient: Mina Gill  YOB: 1940   MRN: [id number]    Acct: [id number]   Admit date: 8/3/2024  Today's date: 08/11/24    Reason for consultation   Bilateral pneumonia, acute hypoxemic respiratory failure.    History of present illness     A 84 y.o. male with past medical history of bradycardia with type I heart block, CAD, diabetes mellitus type 2, pulmonary hypertension, Parkinson, CHF, CKD stage III who was admitted to the hospital on 8/3/2024 shortness of breath  Interval history: 08/11/24  I have seen and examined the patient personally.  The patient is hemodynamically stable.  He is saturating 96 to 98% on room air.  No acute events overnight.    ROS:     Constitutional: no weight loss, no fever, no chills  HEENT: no vision changes, no ear pain, no runny nose, no sore throat  Respiratory: Minimal dyspnea, no cough, no wheeze, no sputum production  CVS: no chest pain, no palpitations, no syncope  Gi: no abdominal pain, no nausea, no vomiting, no diarrhea.  MSK: no myalgia, no joint pain.  Skin: no rash  Neurological: no weakness      OBJECTIVE     Vital Signs:  BP (!) 131/55   Pulse 70   Temp 98.6 °F (37 °C)   Resp 20   Ht 1.702 m (5' 7.01\")   Wt 88.1 kg (194 lb 3.6 oz)   SpO2 98%   BMI 30.41 kg/m²       Physical Exam:  GENERAL: Alert and oriented x 3. Cooperative, No acute distress.  HEENT: EOMI, not pale, no cyanosis  NECK: trachea midline, no JVD. Supple.  LUNGS: Clear to auscultation bilaterally. No wheezes, rales, or rhonchi  CARDIOVASCULAR: Normal S1 and S2. No murmur, rubs, or gallops.  BACK: normal curvature  ABDOMEN: Soft, non-tender, non-distended  EXTREMITIES: No cyanosis, no edema, no calf tenderness  SKIN: Warm and dry.  NEUROLOGIC: No extremity weakness. No slurred speech      Medications:Current 
                 [facility]  Pulmonary progress note  ______________________________________________________________________________    Patient: Mina Gill  YOB: 1940   MRN: [id number]    Acct: [id number]   Admit date: 8/3/2024  Today's date: 08/12/24    Reason for consultation   Bilateral pneumonia, acute hypoxemic respiratory failure.    History of present illness     A 84 y.o. male with past medical history of bradycardia with type I heart block, CAD, diabetes mellitus type 2, pulmonary hypertension, Parkinson, CHF, CKD stage III who was admitted to the hospital on 8/3/2024 shortness of breath    Interval history: 08/12/24    No acute overnight event.  Patient is hemodynamically stable, afebrile on room air, saturating well.  No respiratory distress or acute concerns.     ROS:     Constitutional: no weight loss, no fever, no chills  HEENT: no vision changes, no ear pain, no runny nose, no sore throat  Respiratory: Minimal dyspnea, no cough, no wheeze, no sputum production  CVS: no chest pain, no palpitations, no syncope  Gi: no abdominal pain, no nausea, no vomiting, no diarrhea.  MSK: no myalgia, no joint pain.  Skin: no rash  Neurological: no weakness      OBJECTIVE     Vital Signs:  /68   Pulse 86   Temp 98.2 °F (36.8 °C) (Oral)   Resp 18   Ht 1.702 m (5' 7.01\")   Wt 86.4 kg (190 lb 7.6 oz)   SpO2 97%   BMI 29.83 kg/m²       Physical Exam:  GENERAL: Alert and oriented x 3. Cooperative, No acute distress.  HEENT: EOMI, not pale, no cyanosis  NECK: trachea midline, no JVD. Supple.  LUNGS: Clear to auscultation bilaterally. No wheezes, rales, or rhonchi  CARDIOVASCULAR: Normal S1 and S2. No murmur, rubs, or gallops.  BACK: normal curvature  ABDOMEN: Soft, non-tender, non-distended  EXTREMITIES: No cyanosis, no edema, no calf tenderness  SKIN: Warm and dry.  NEUROLOGIC: No extremity weakness. No slurred speech      Medications:Current 
      Attending Physician Statement  I have discussed the case, including pertinent history and exam findings with the resident and the team.  I have seen and examined the patient and the key elements of the encounter have been performed by me.  I agree with the assessment, plan and orders as documented by the resident.      In Brief:  Mina Gill is a 84 y.o. male, who is on day 11 of hospital stay, presented with Chest Pain and Shortness of Breath  , found to have Complete heart block (HCC).    Principal Problem:    Complete heart block (HCC)  Active Problems:    Chronic pain of multiple joints    NSTEMI (non-ST elevated myocardial infarction) (HCC)    Essential hypertension    Parkinson disease (HCC)    Hypervolemia    Pneumonia of right lung due to infectious organism    Acute kidney injury superimposed on CKD (HCC)    Acute heart failure (HCC)    Biventricular congestive heart failure (HCC)    Heart block AV complete (HCC)    1st degree AV block    Septicemia (HCC)  Resolved Problems:    Bradycardia    Acute hypoxic respiratory failure (HCC)    Metabolic acidosis      Plan:       08/14  Okay to discharge and have outpatient follow-up with PCP.      08/13  Patient seen and evaluated.  Patient appears to be stable.  Continue current medications and plans.  Awaiting placement into skilled nursing facility.  Awaiting for precertification.        08/12  Patient seen and evaluated.  Patient was admitted with complete heart block.  Patient was evaluated by cardiology and underwent permanent dual-chamber pacemaker.  EP input appreciated.  Currently heart rate is stable.  Patient has been treated for aspiration pneumonia.  Patient has finished course of antibiotics.  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  Speech therapy input appreciated.  Continue current medication.  Plan is to discharge to skilled nursing facility.  Awaiting precertification.      ROJELIO BURDICK 
    [facility]  Internal Medicine Teaching Residency Program  Inpatient Daily Progress Note  ______________________________________________________________________________    Patient: Mina Gill  YOB: 1940   MRN:[id number]    Acct: [id number]     Room: 0315/0315-01  Admit date: 8/3/2024  Today's date: 08/12/24  Number of days in the hospital: 9    SUBJECTIVE   Admitting Diagnosis: Complete heart block (HCC)  CC:  Chest pain and shortness of breath.      Pt examined at bedside. Chart & results reviewed.   No overnight acute event  Patient is alert, awake, oriented, AOx4, hemodynamically stable, saturating well on room air  Patient has Parkinson's tremors  Not following any labs at this time.  Plan is to discharge patient on today, as patient is excepted at [facility] and patient will have pre-CERT today.    ROS:  Constitutional:  negative for chills, fevers, sweats  Respiratory:  negative for cough, dyspnea on exertion, hemoptysis, shortness of breath, wheezing  Cardiovascular:  negative for chest pain, chest pressure/discomfort, lower extremity edema, palpitations  Gastrointestinal:  negative for abdominal pain, constipation, diarrhea, nausea, vomiting  Neurological:  negative for dizziness, headache  BRIEF HISTORY     The patient is a 84 y.o. male with past medical history significant for congestive heart failure, coronary artery disease s/p LAD stent 2018, advanced Parkinson's disease and esophagitis, patient initially admitted on 8/3/2024 with Complete heart block (HCC) [I44.2]  Bradycardia [R00.1]  Septicemia (HCC) [A41.9]  TAMARA (acute kidney injury) (HCC) [N17.9]  1st degree AV block [I44.0]  Pneumonia of right lower lobe due to infectious organism [J18.9]  Hypervolemia, unspecified hypervolemia type [E87.70] and presented with complete heart block.     In the ER patient presented  with complaints of Chest pain shortness of breath.  Found to have a 
    [facility]  Internal Medicine Teaching Residency Program  Inpatient Daily Progress Note  ______________________________________________________________________________    Patient: Mina Gill  YOB: 1940   MRN:[id number]    Acct: [id number]     Room: 0315/0315-01  Admit date: 8/3/2024  Today's date: 08/13/24  Number of days in the hospital: 10    SUBJECTIVE   Admitting Diagnosis: Complete heart block (HCC)  CC:  Chest pain and shortness of breath.      Pt examined at bedside. Chart & results reviewed.   No overnight acute event  Patient is alert, awake, oriented, AOx4, hemodynamically stable, saturating well on room air  Patient has Parkinson's tremors  Not following any labs at this time.  Plan is to discharge patient on today, as  patient is excepted at [facility].  ROS:  Constitutional:  negative for chills, fevers, sweats  Respiratory:  negative for cough, dyspnea on exertion, hemoptysis, shortness of breath, wheezing  Cardiovascular:  negative for chest pain, chest pressure/discomfort, lower extremity edema, palpitations  Gastrointestinal:  negative for abdominal pain, constipation, diarrhea, nausea, vomiting  Neurological:  negative for dizziness, headache  BRIEF HISTORY     The patient is a 84 y.o. male with past medical history significant for congestive heart failure, coronary artery disease s/p LAD stent 2018, advanced Parkinson's disease and esophagitis, patient initially admitted on 8/3/2024 with Complete heart block (HCC) [I44.2]  Bradycardia [R00.1]  Septicemia (HCC) [A41.9]  TAMARA (acute kidney injury) (HCC) [N17.9]  1st degree AV block [I44.0]  Pneumonia of right lower lobe due to infectious organism [J18.9]  Hypervolemia, unspecified hypervolemia type [E87.70] and presented with complete heart block.     In the ER patient presented  with complaints of Chest pain shortness of breath.  Found to have a pulse in the 40s.  EKG concerning for 
    [facility]  Internal Medicine Teaching Residency Program  Inpatient Daily Progress Note  ______________________________________________________________________________    Patient: Mina Gill  YOB: 1940   MRN:[id number]    Acct: [id number]     Room: 0315/0315-01  Admit date: 8/3/2024  Today's date: 08/14/24  Number of days in the hospital: 11    SUBJECTIVE   Admitting Diagnosis: Complete heart block (HCC)  CC:  Chest pain and shortness of breath.      Pt examined at bedside. Chart & results reviewed.   No overnight acute event  Patient is alert, awake, oriented, AOx4, hemodynamically stable, saturating well on room air  Patient has Parkinson's tremors  Not following any labs at this time.  Plan is to discharge patient on today, as  patient is excepted at [facility].      ROS:  Constitutional:  negative for chills, fevers, sweats  Respiratory:  negative for cough, dyspnea on exertion, hemoptysis, shortness of breath, wheezing  Cardiovascular:  negative for chest pain, chest pressure/discomfort, lower extremity edema, palpitations  Gastrointestinal:  negative for abdominal pain, constipation, diarrhea, nausea, vomiting  Neurological:  negative for dizziness, headache  BRIEF HISTORY     The patient is a 84 y.o. male with past medical history significant for congestive heart failure, coronary artery disease s/p LAD stent 2018, advanced Parkinson's disease and esophagitis, patient initially admitted on 8/3/2024 with Complete heart block (HCC) [I44.2]  Bradycardia [R00.1]  Septicemia (HCC) [A41.9]  TAMARA (acute kidney injury) (HCC) [N17.9]  1st degree AV block [I44.0]  Pneumonia of right lower lobe due to infectious organism [J18.9]  Hypervolemia, unspecified hypervolemia type [E87.70] and presented with complete heart block.     In the ER patient presented  with complaints of Chest pain shortness of breath.  Found to have a pulse in the 40s.  EKG concerning for 
    [facility]  Internal Medicine Teaching Residency Program  Inpatient Daily Progress Note  ______________________________________________________________________________    Patient: Mina Gill  YOB: 1940   MRN:[id number]    Acct: [id number]     Room: 0315/0315-01  Admit date: 8/3/2024  Today's date: 08/10/24  Number of days in the hospital: 7    SUBJECTIVE   Admitting Diagnosis: Complete heart block (HCC)  CC:  Chest pain and shortness of breath.      Pt examined at bedside. Chart & results reviewed.   No overnight acute event  Patient is alert, awake, oriented, AOx4, hemodynamically stable, saturating well on room air  Patient has Parkinson's tremors  Labs this a.m. reviewed showed slight low sodium 135, creatinine is about the baseline, CBC is unremarkable with hemoglobin stable    ROS:  Constitutional:  negative for chills, fevers, sweats  Respiratory:  negative for cough, dyspnea on exertion, hemoptysis, shortness of breath, wheezing  Cardiovascular:  negative for chest pain, chest pressure/discomfort, lower extremity edema, palpitations  Gastrointestinal:  negative for abdominal pain, constipation, diarrhea, nausea, vomiting  Neurological:  negative for dizziness, headache  BRIEF HISTORY     The patient is a 84 y.o. male with past medical history significant for congestive heart failure, coronary artery disease s/p LAD stent 2018, advanced Parkinson's disease and esophagitis, patient initially admitted on 8/3/2024 with Complete heart block (HCC) [I44.2]  Bradycardia [R00.1]  Septicemia (HCC) [A41.9]  TAMARA (acute kidney injury) (HCC) [N17.9]  1st degree AV block [I44.0]  Pneumonia of right lower lobe due to infectious organism [J18.9]  Hypervolemia, unspecified hypervolemia type [E87.70] and presented with complete heart block.     In the ER patient presented  with complaints of Chest pain shortness of breath.  Found to have a pulse in the 40s.  EKG 
    [facility]  Internal Medicine Teaching Residency Program  Inpatient Daily Progress Note  ______________________________________________________________________________    Patient: Mina Gill  YOB: 1940   MRN:[id number]    Acct: [id number]     Room: 0315/0315-01  Admit date: 8/3/2024  Today's date: 08/09/24  Number of days in the hospital: 6    SUBJECTIVE   Admitting Diagnosis: Complete heart block (HCC)  CC: Chest pain and shortness of breath.  Pt examined at bedside. Chart & results reviewed.     No acute overnight events.  Patient is vitally stable.  Patient is maintaining saturation at room air.  Patient is s/p permanent dual-chamber pacemaker implanted day 3.    ROS:  Constitutional:  negative for chills, fevers, sweats  Respiratory:  negative for cough, dyspnea on exertion, hemoptysis, shortness of breath, wheezing  Cardiovascular:  negative for chest pain, chest pressure/discomfort, lower extremity edema, palpitations  Gastrointestinal: Patient is complaining dysphagia to liquids.  Negative for abdominal pain, constipation, diarrhea, nausea, vomiting  Neurological:  negative for dizziness, headache  BRIEF HISTORY     The patient is a 84 y.o. male with past medical history significant for congestive heart failure, coronary artery disease s/p LAD stent 2018, advanced Parkinson's disease and esophagitis, patient initially admitted on 8/3/2024 with Complete heart block (HCC) [I44.2]  Bradycardia [R00.1]  Septicemia (HCC) [A41.9]  TAMARA (acute kidney injury) (HCC) [N17.9]  1st degree AV block [I44.0]  Pneumonia of right lower lobe due to infectious organism [J18.9]  Hypervolemia, unspecified hypervolemia type [E87.70] and presented with complete heart block.     In the ER patient presented  with complaints of Chest pain shortness of breath.  Found to have a pulse in the 40s.  EKG concerning for third-degree heart block. Cardiology recommended starting 
  NEUROLOGY INPATIENT PROGRESS NOTE    8/4/2024         Mina Gill is a  83 y.o. male admitted on 8/3/2024 with  Complete heart block (HCC) [I44.2]  Bradycardia [R00.1]  Septicemia (HCC) [A41.9]  TAMARA (acute kidney injury) (HCC) [N17.9]  1st degree AV block [I44.0]  Pneumonia of right lower lobe due to infectious organism [J18.9]  Hypervolemia, unspecified hypervolemia type [E87.70]    Reason for consult: Parkinson disease  History is obtained mostly from the patient and the medical record and from the caregivers. Chart is reviewed and patient is examined.   Briefly, this is a  83 y.o. male with hx of HTN, CKD, CAD and Parkinson disease was admitted on 8/3/2024 with c/o progressive fatigue and intermittent chest pain and shortness of breath.    He was found to have bradycardia with heart rate in 30s for which he was on atropine and was taken to Cath Lab and has had temporary pacemaker this morning.  Patient and his caregivers stated that he has been having swallowing difficulties and not taking his medications for parkinsonism for the past couple of days.  Patient has been having increased tremors for which neurology is consulted.   This patient is known to neurology service and he has tremor dominant parkinsonism for which he has been on Sinemet 25/100 qid and entacapone 200 mg qid and Requip 0.5 mg tid.  Presently patient is having significant tremors in bilateral upper extremities along with bradykinesia and rigidity and also has voice tremor.  Patient denies headaches but admits to having dizziness.    8/4/2024: Chart reviewed and discussed with caregivers.  Examined the patient.  He has improvement in tremors.  Caregivers stated that he did not have any sleep last night and he was \"talking through the sleep and trying to go out of bed\".  Also noted to be acting out during sleep \"as though he is trying to catch something in the air\" suggestive of REM sleep behavior disorder.       No current 
  [facility]     Department of Internal Medicine - Staff Internal Medicine Service   ICU PATIENT TRANSFER NOTE        Patient:  Mina Gill  YOB: 1940  MRN: [id number]     Acct: [id number]     Admit date: 8/3/2024    Code Status:-  Full code     Reason for ICU Admission:-Complete heart block needing temporary pacemaker placement.    SUPPORT DEVICES: [] Ventilator [] BIPAP  [] Nasal Cannula [x] Room Air    Consultations:- [x] Cardiology [x] Nephrology  [] Hemo onco  [] GI                               [x] ID [] ENT  [] Rheum [] Endo   [x]Physiotherapy                                 Others:-     NUTRITION:  [] NPO [] Tube Feeding (Specify: ) [] TPN  [x] PO    Central Lines:- [x] No   [] Yes           If yes - Days/Date of Insertion.      Pt seen,examined and Chart reviewed.    ICU COURSE:    The patient is a 84 y.o. male with past medical history significant for congestive heart failure, coronary artery disease s/p LAD stent 2018, advanced Parkinson's disease and esophagitis, patient initially admitted on 8/3/2024 with Complete heart block (HCC) [I44.2]  Bradycardia [R00.1]  Septicemia (HCC) [A41.9]  TAMARA (acute kidney injury) (HCC) [N17.9]  1st degree AV block [I44.0]  Pneumonia of right lower lobe due to infectious organism [J18.9]  Hypervolemia, unspecified hypervolemia type [E87.70] and presented with complete heart block.    In the ER patient presented  with complaints of Chest pain shortness of breath.  Found to have a pulse in the 40s.  EKG concerning for third-degree heart block. Cardiology recommended starting dopamine drip.  Patient also was taken for emergent temporary pacemaker placement this morning.  Currently, pacemaker functioning properly with heart rate of 60 and currently at 3 mA.     Due to patient's shortness of breath chest pain chest x-ray was completed.  Chest x-ray was read to show right lower lobe lung infiltrates, but seems patient does have infiltrates 
Attending Physician Statement  I have discussed the care of Mina Gill, including pertinent history and exam findings with the resident. I have reviewed the key elements of all parts of the encounter with the resident. I have seen and examined the patient with the resident.  I agree with the assessment and plan and status of the problem list as documented.    I saw the patient during around this morning after patient was brought in to medical ICU from Cath Lab and from the emergency department.  I have reviewed the labs, I reviewed the chest x-rays, other labs and venous blood gases were seen.  Patient has history of Parkinson's disease, history of coronary artery disease and history of stent on antiplatelet therapy severe Parkinson's disease on multiple medications for Parkinson's was brought in because of nonspecific symptoms of fatigue and tiredness but also patient with chest pain and shortness of breath found to be severely bradycardic with complete heart block in the emergency room.  He was given atropine and started on dopamine drip.  Remained bradycardic seen by cardiology was taken to Cath Lab and had a transvenous pacemaker placed.  He is proBNP is elevated to 11,000 his creatinine is 2.5 baseline creatinine is around 1.3 in June 2024.  Chest x-ray was concerning for right lower lobe infiltrate but he has generalized pulmonary interstitial infiltrate present bilaterally much more on the right as compared to left so pulmonary edema to be considered which could be secondary to severe bradycardia and complete heart block also proBNP is elevated.  There is no history of fever chills or flulike symptoms but history is not very clear he is currently on 100% nonrebreather and has been tachypneic.  He most likely have dysphagia and aspiration need to be considered also.    Continue to follow-up with cardiology.  Will resume his Parkinson's medication.  Will use Rocephin Zithromax and Flagyl.  Will get 2D 
Attending Physician Statement  I have discussed the care of Mina Gill, including pertinent history and exam findings,  with the resident. I have seen and examined the patient and the key elements of all parts of the encounter have been performed by me.  I agree with the assessment, plan and orders as documented by the resident with additions .           Principal Problem:    Complete heart block (HCC)  Active Problems:    Chronic pain of multiple joints    NSTEMI (non-ST elevated myocardial infarction) (HCC)    Essential hypertension    Parkinson disease (HCC)    Bradycardia    Pneumonia of right lung due to infectious organism    Acute kidney injury superimposed on CKD (HCC)    Acute heart failure (HCC)    Metabolic acidosis    Biventricular congestive heart failure (HCC)    Heart block AV complete (HCC)  Resolved Problems:    * No resolved hospital problems. *     Plan   PPM artem  Cont tf          Total critical care time caring for this patient with life threatening, unstable organ failure, including direct patient contact, management of life support systems, review of data including imaging and labs, discussions with other team members and physicians at least 30   Min so far today, excluding procedures.          Electronically signed by JG OCHOA MD on   8/6/24 at 7:00 PM EDT    Please note that this chart was generated using voice recognition Dragon dictation software.  Although every effort was made to ensure the accuracy of this automated transcription, some errors in transcription may have occurred.   
[facility]  Speech Language Pathology    Date: 8/7/2024  Patient Name: Mina Gill  YOB: 1940   AGE: 84 y.o.  MRN: [id number]        Patient Not Available for Speech Therapy     Due to:  [] Testing  [] Hemodialysis  [] Cancelled by RN  [] Surgery   [] Intubation/Sedation/Pain Medication  [] Medical instability  [x] Other: Pt. Continues to be unable to sit up to complete bedside swallow study at this time.     Next scheduled treatment: 8/8  Completed by: BENNIE HUERTA, SLP, M.A. CCC-SLP    
[facility]   Progress Note                   Date:   8/6/2024  Patient name: Mina Gill  Date of admission:  8/3/2024  6:27 AM  MRN:   [id number]  YOB: 1940  PCP: Tisha Glasgow MD    Reason for Admission: Fatigue, chest pain    Subjective:       The patient was seen and evaluated at bedside. No acute events overnight.  Currently in ventricular paced rhythm at 60.  Hemodynamically stable and maintaining saturation on 2 L of nasal cannula.  UOP 1.7 L / 24 hours.  Creatinine improved from 1.7 >1.3.  Cleared by ID for PPM, vancomycin 1250 mg IV x 1 to be given at that time.  Currently on ceftriaxone for pneumonia.    Medications:   Scheduled Meds:   furosemide  40 mg Per NG tube Daily    melatonin  3 mg Oral Nightly    sodium chloride flush  5-40 mL IntraVENous 2 times per day    carbidopa-levodopa  1 tablet Oral 4x Daily    entacapone  200 mg Oral 4x Daily    rOPINIRole  0.5 mg Oral TID    sucralfate  1 g Oral 4 times per day    gabapentin  100 mg Oral Daily    cefTRIAXone (ROCEPHIN) IV  1,000 mg IntraVENous Q24H    pantoprazole (PROTONIX) 40 mg in sodium chloride (PF) 0.9 % 10 mL injection  40 mg IntraVENous Daily    sodium chloride flush  5-40 mL IntraVENous 2 times per day    amitriptyline  10 mg Oral Nightly    [Held by provider] heparin (porcine)  5,000 Units SubCUTAneous 3 times per day       Continuous Infusions:   sodium chloride Stopped (08/04/24 1113)    sodium chloride 5 mL/hr at 08/06/24 0529       CBC:   Recent Labs     08/04/24 0252 08/05/24  0327 08/06/24  0505   WBC 13.1* 8.3 9.4   HGB 13.3 13.0 13.9    141 181       BMP:    Recent Labs     08/04/24  0252 08/05/24  0327 08/06/24  0505   * 135* 136   K 4.5 3.9 4.1    103 103   CO2 19* 20 21   BUN 39* 38* 34*   CREATININE 2.1* 1.7* 1.3*   GLUCOSE 141* 136* 158*       Hepatic:   Recent Labs     08/04/24  0252 08/05/24  0327 08/06/24  0505   AST 17 20 23   ALT 12 8* 10   BILITOT 1.6* 0.6 0.6   ALKPHOS 
[facility]  Speech Language Pathology    Date: 8/5/2024  Patient Name: Mina Gill  YOB: 1940   AGE: 83 y.o.  MRN: [id number]        Patient Not Available for Speech Therapy     Due to:  [] Testing  [] Hemodialysis  [] Cancelled by RN  [] Surgery   [] Intubation/Sedation/Pain Medication  [] Medical instability  [x] Other: Spoke with RN, pt. Has NG, lethargic and is unable to sit up until Wednesday 8/7.  Not appropriate for  bedside swallow study at this time.     Next scheduled treatment: as pt. able  Completed by: BENNIE HUERTA, SLP, M.A. LATONYA-SLP    
Comprehensive Nutrition Assessment    Type and Reason for Visit:  Initial, Positive Nutrition Screen    Nutrition Recommendations/Plan:   RD not managing TF order currently. Recommend modify TF to meet estimated needs: consider Peptide Based TF at goal rate 50 mL/hr with 1 protein modular per day to provide 1544 kcal, 116 gm protein.  Monitor POC, chewing/swallowing, weight, labs, GI status, fluid status.     Malnutrition Assessment:  Malnutrition Status:  At risk for malnutrition (Comment) (08/04/24 4940)    Context:  Acute Illness     Findings of the 6 clinical characteristics of malnutrition:  Energy Intake:  Mild decrease in energy intake (Comment) (poor PO x a few days)  Weight Loss:  Unable to assess (weight gain r/t fluid)     Body Fat Loss:  No significant body fat loss     Muscle Mass Loss:  No significant muscle mass loss    Fluid Accumulation:  Mild Extremities   Strength:  Not Performed    Nutrition Assessment:    82 yo M adm complete heart block. TAMARA on CKD. PMH: Parkinson's dz, HTN, esophagitis, CKD 3b. +NGT. Peptide Based TF started by MD. RNC for unsure of amount of weight loss and poor appetite. Pt resting in bed at time of visit, no visitors present. EMR weight hx indicating significant weight gain - ? r/t fluid status and possibly masking weight loss. Per RN, pt may have a swallowing issue, reports poor PO x a few days and unable to take Parkinson's meds PTA.    Nutrition Related Findings:    +1/+2 extremity edema. Meds and labs reviewed. Wound Type: None       Current Nutrition Intake & Therapies:    Average Meal Intake: NPO  Average Supplements Intake: NPO  Diet NPO  ADULT TUBE FEEDING; Nasogastric; Peptide Based; Continuous; 20; Yes; 20; Q 4 hours; 40; 200; Q 6 hours  Current Tube Feeding (TF) Orders:  Feeding Route: Nasogastric  Formula: Peptide Based  Schedule: Continuous  Feeding Regimen: 40 mL/hr  Additives/Modulars: None  Water Flushes: 200 mL q 6 hours  Current TF & Flush Orders 
Comprehensive Nutrition Assessment    Type and Reason for Visit:  Reassess    Nutrition Recommendations/Plan:   Monitor results of video swallow study and start of oral diet as able/appropriate.  If pt remains NPO, suggest restarting TF of Peptide Based formula goal 55 mL/hr.;     Malnutrition Assessment:  Malnutrition Status:  At risk for malnutrition (08/08/24 1353)    Context:  Acute Illness     Findings of the 6 clinical characteristics of malnutrition:  Energy Intake:  50% or less of estimated energy requirements for 5 or more days  Weight Loss:  Unable to assess - fluctuations d/t fluids.   Body Fat Loss:  No significant body fat loss   Muscle Mass Loss:  No significant muscle mass loss  Fluid Accumulation:  No significant fluid accumulation    Strength:  Not Performed    Nutrition Assessment:    Pt remains NPO with NGT in place.  RN reports plans for video swallow study this afternoon - based on results either plan to start oral diet or restart Tube Feedings.  Weight fluctuations - down 6% since admission - ? due to fluids.  Labs reviewed.  Meds include: Sinemet, Lasix.    Nutrition Related Findings:    Labs/Meds reviewed.  Last BM 8/5. Wound Type: Surgical Incision       Current Nutrition Intake & Therapies:    Average Meal Intake: NPO  Average Supplements Intake: NPO  Diet NPO  Current Tube Feeding (TF) Orders:  Feeding Route: Nasogastric  Formula: Peptide Based  Schedule: Continuous  Feeding Regimen: TF on hold - monitor results of swallow study and plans for nutrition  Additives/Modulars: None  Water Flushes: per MD  Current TF & Flush Orders Provides: 0 kcals, 0 gm protein  Goal TF & Flush Orders Provides: Peptide Based at 55 mL/hr = 1584 kcals, 99 gm protein    Additional Calorie Sources:  None    Anthropometric Measures:  Height: 170.2 cm (5' 7.01\")  Ideal Body Weight (IBW): 148 lbs (67 kg)    Admission Body Weight: 90.8 kg (200 lb 2.8 oz)  Current Body Weight: 85.6 kg (188 lb 11.4 oz), 127.5 % 
INTENSIVE CARE UNIT  Resident Physician Progress Note    Patient - Mina Gill  Date of Admission -  8/3/2024  6:27 AM  Date of Evaluation -  8/4/2024  Room and Bed Number -  3021/3021-01   Hospital Day - 1    HPI:     Pt with diastolic CHF, diabetes, CAD with LAD stent 2018, first degree block and incomplete R bundle branch block with advanced Parkinson's disease and esophagitis. Presented with chest pain, SOB, fatigue for 3 days.     EKG in ED showed complete heart block, given atropine with no effect and taken for emergent pacemaker this morning. Afterwards was functioning appropriately at a HR of 60.     CXR showing multiple bilateral infiltrates R > L. Given rocephin and azithromycin, possible aspiration but unknown cause of consolidation.     Has advanced parkinson's disease on sinemet, ropinrole, entacapone     Will assess with cardiology, cover with broad spectrum antibiotics, get 2D echo for cardiac stratifiction, and will follow with nephrology and neurology for TAAMRA and parkinson's respectively.     SUBJECTIVE:     OVERNIGHT EVENTS:        Procal low.     Pt slightly altered, tugging at lines etc, given mitts    Hemodynamically stable, febrile, ID consulted as CXR doesn't specifically show consolidation with fever of unknown origin    TODAY:      Cardiology has seen the patient and is recommending infectious disease consult due to fevers this morning. Will discuss with team during rounds.     Nephrology suggesting cont. Lasix, au stays in, cont tube feeds    Neurology suggesting SLP for parkinson's with swallowing and aspiration risk, otherwise melatonin for REM sleep disorder from parkinson's and can change sinemet formulation however patient did not take his medications for a few days prior.     Awaiting ID        AWAKE & FOLLOWING COMMANDS:  [] No   [x] Yes    SECRETIONS Amount:  [] Small [] Moderate  [] Large  [x] None  Color:     [] White [] Colored  [] Bloody    SEDATION:  RAAS Score:  [] 
INTENSIVE CARE UNIT  Resident Physician Progress Note    Patient - Mina Gill  Date of Admission -  8/3/2024  6:27 AM  Date of Evaluation -  8/6/2024  Room and Bed Number -  3021/3021-01   Hospital Day - 3  Chief Complaint   Patient presents with    Chest Pain    Shortness of Breath      HPI:     Pt with diastolic CHF, diabetes, CAD with LAD stent 2018, first degree block and incomplete R bundle branch block with advanced Parkinson's disease and esophagitis. Presented with chest pain, SOB, fatigue for 3 days.     EKG in ED showed complete heart block, given atropine with no effect and taken for emergent pacemaker this morning. Afterwards was functioning appropriately at a HR of 60.     CXR showing multiple bilateral infiltrates R > L. Given rocephin and azithromycin, possible aspiration but unknown cause of consolidation.     Has advanced parkinson's disease on sinemet, ropinrole, entacapone     Will assess with cardiology, cover with broad spectrum antibiotics, get 2D echo for cardiac stratifiction, and will follow with nephrology and neurology for TAMARA and parkinson's respectively.     SUBJECTIVE:     OVERNIGHT EVENTS:        Cr improving    Pt too lethargic for SLP per note from 8/5 afternoon    Cardiology no other plans, PPM with vancomycin proph prior to procedure per ID, cleared     TODAY:      Nephrology suggesting cont. Lasix, remove au, cont tube feeds    Neurology suggesting SLP for parkinson's with swallowing and aspiration risk, otherwise melatonin for REM sleep disorder from parkinson's and can change sinemet formulation however patient did not take his medications for a few days prior. SLP waiting until 8/7 for eval as pt was not awake enough to evaluate.     No further orders today. NPO at midnight with 50 cc/hr fluids for procedure.    AWAKE & FOLLOWING COMMANDS:  [] No   [x] Yes    SECRETIONS Amount:  [] Small [] Moderate  [] Large  [x] None  Color:     [] White [] Colored  [] 
INTENSIVE CARE UNIT  Resident Physician Progress Note    Patient - Mina Gill  Date of Admission -  8/3/2024  6:27 AM  Date of Evaluation -  8/7/2024  Room and Bed Number -  3021/3021-01   Hospital Day - 4  Chief Complaint   Patient presents with    Chest Pain    Shortness of Breath      HPI:     Pt with diastolic CHF, diabetes, CAD with LAD stent 2018, first degree block and incomplete R bundle branch block with advanced Parkinson's disease and esophagitis. Presented with chest pain, SOB, fatigue for 3 days.     EKG in ED showed complete heart block, given atropine with no effect and taken for emergent pacemaker this morning. Afterwards was functioning appropriately at a HR of 60.     CXR showing multiple bilateral infiltrates R > L. Given rocephin and azithromycin, possible aspiration but unknown cause of consolidation.     Has advanced parkinson's disease on sinemet, ropinrole, entacapone     Will assess with cardiology, cover with broad spectrum antibiotics, get 2D echo for cardiac stratifiction, and will follow with nephrology and neurology for TAMARA and parkinson's respectively.     SUBJECTIVE:     OVERNIGHT EVENTS:      NPO at midnight with LR 50cc/hr for PPM     TODAY:    Fluids: LR @ 50cc/hr   UO: 1.92L in last 24 hours. No BM in last 24 hours.     Continue Rocephin     PPM today, cleared by ID. Vancomycin 1250 mg IV x 1 for prophylaxis prior to procedure.  Anticoagulation held.     Neurology suggesting SLP for parkinson's with swallowing and aspiration risk, otherwise melatonin for REM sleep disorder from parkinson's and can change sinemet formulation however patient did not take his medications for a few days prior. SLP unable to evaluate today as patient is unable to sit up and complete bedside swallow study. Will reevaluate 8/8.       AWAKE & FOLLOWING COMMANDS:  [] No   [x] Yes    SECRETIONS Amount:  [] Small [] Moderate  [] Large  [x] None  Color:     [] White [] Colored  [] Bloody    SEDATION:  RAAS 
INTENSIVE CARE UNIT  Resident Physician Progress Note    Patient - Mina Gill  Date of Admission -  8/3/2024  6:27 AM  Date of Evaluation -  8/8/2024  Room and Bed Number -  3021/3021-01   Hospital Day - 5  Chief Complaint   Patient presents with    Chest Pain    Shortness of Breath      HPI:     Pt with diastolic CHF, diabetes, CAD with LAD stent 2018, first degree block and incomplete R bundle branch block with advanced Parkinson's disease and esophagitis. Presented with chest pain, SOB, fatigue for 3 days.     EKG in ED showed complete heart block, given atropine with no effect and taken for emergent pacemaker this morning. Afterwards was functioning appropriately at a HR of 60.     CXR showing multiple bilateral infiltrates R > L. Given rocephin and azithromycin, possible aspiration but unknown cause of consolidation.     Has advanced parkinson's disease on sinemet, ropinrole, entacapone     Will assess with cardiology, cover with broad spectrum antibiotics, get 2D echo for cardiac stratifiction, and will follow with nephrology and neurology for TAMARA and parkinson's respectively.       SUBJECTIVE:     OVERNIGHT EVENTS:      PPM 8/8. No overnight events.     TODAY:    Patient was evaluated this morning. Awake and alert, following commands. Complaining of pain over site of PPM. Denies chest pain, SOB, diarrhea, nausea, vomiting.     Afebrile, saturating well on 2L NC.   Fluids: LR @ 50cc/hr   UO: 2.455L in last 24 hours.     CXR - negative for pneumothorax    Continue Rocephin - stop 8/10    Swallow study today. If okay, restart diet and D/C fluids.     Patient is medically stable to be discharged from ICU.       AWAKE & FOLLOWING COMMANDS:  [] No   [x] Yes    SECRETIONS Amount:  [] Small [] Moderate  [] Large  [x] None  Color:     [] White [] Colored  [] Bloody    SEDATION:  RAAS Score:  [] Propofol gtt  [] Versed gtt  [] Ativan gtt   [x] No Sedation    PARALYZED:  [x] No    [] Yes    VASOPRESSORS:  [x] No 
[facility] - Progress Note    Today's Date and Time: 8/12/2024, 6:46 AM    Impression :   Complete heart block   S/p temporary pacemaker placement on 8/3/24  NSTEMI likely type II  Concern for aspiration PNA vs pneumonitis  Lactic acidosis-resolved  Leukocytosis-resolved  Low grade fever  Acute on chronic CHF   TAMARA on CKD stage III  CAD s/p PCI in 2018  DM II  Pulmonary HTN  Advanced Parkinson's  GERD    Recommendations:   Completed Rocephin 1 gm IV q 24 hr. Stop date 8-10-24  IV Rocephin and Flagyl were initiated on 8/3/24  Flagyl D/C on 8/5/24  Completed Azithromycin  Legionella,mycoplasma, strep pneumo and viral respiratory panel were negative  S/P PPM  placement on 8-7-24  Received Vancomycin 1250 mg IV x 1 for prophylaxis     Medical Decision Making/Summary/Discussion:8/12/2024     Daily Elements of Decision Making provided by Consulting Physician:    Note: I have independently performed the steps listed below as part of the medical decision making and evaluation.    Review of current Problems:  Today I am seeing the patient for the following problems:  Complete heart block   S/p temporary pacemaker placement on 8/3/24  NSTEMI likely type II  Concern for aspiration PNA vs pneumonitis  Lactic acidosis-resolved  Leukocytosis-resolved  Low grade fever  Acute on chronic CHF   TAMARA on CKD stage III  CAD s/p PCI in 2018  DM II  Pulmonary HTN  Advanced Parkinson's  GERD  Evaluation of Patient:  Patient evaluated and examined in the ICU.  Acute on chronic CHF   TAMARA on CKD stage III  Evaluated for fevers, leucocytosis. Concern for aspiration  S/P PPM placement 8-7-24  Please see daily details in Interval Changes Section  Changes in physical exam:  Confusion, tremors  CHF  Complete heart block with temporary pacemaker  Received permanent pacemaker 8-7-24  Please see daily details in Physical Exam section and in Interval Changes Section  Changes in ROS:  Unchanged  Please see daily 
[facility] - Progress Note    Today's Date and Time: 8/7/2024, 6:50 AM    Impression :   Complete heart block   S/p temporary pacemaker placement on 8/3/24  NSTEMI likely type II  Concern for aspiration PNA vs pneumonitis  Lactic acidosis-resolved  Leukocytosis-resolved  Low grade fever  Acute on chronic CHF   TAMARA on CKD stage III  CAD s/p PCI in 2018  DM II  Pulmonary HTN  Advanced Parkinson's  GERD    Recommendations:   IV Rocephin and Flagyl were initiated on 8/3/24  D/C Flagyl 8/5/24  Completed Azithromycin  Legionella,mycoplasma, strep pneumo and viral respiratory panel were negative  Gentle diuresis  Renal considerations  Cleared for PPM  todd/cement from an ID perspective, at the discretion of Cardiology  Insertion planned 8-7-24  Vancomycin 1250 mg IV x 1 for prophylaxis at that time-ordered    Medical Decision Making/Summary/Discussion:8/7/2024     Daily Elements of Decision Making provided by Consulting Physician:    Note: I have independently performed the steps listed below as part of the medical decision making and evaluation.    Review of current Problems:  Today I am seeing the patient for the following problems:  Complete heart block   S/p temporary pacemaker placement on 8/3/24  NSTEMI likely type II  Concern for aspiration PNA vs pneumonitis  Lactic acidosis-resolved  Leukocytosis-resolved  Low grade fever  Acute on chronic CHF   TAMARA on CKD stage III  CAD s/p PCI in 2018  DM II  Pulmonary HTN  Advanced Parkinson's  GERD  Evaluation of Patient:  Patient evaluated and examined in the ICU.  Acute on chronic CHF   TAMARA on CKD stage III  Evaluated for fevers, leucocytosis. Concern for aspiration  Please see daily details in Interval Changes Section  Changes in physical exam:  Confusion, tremors  CHF  Complete heart block with temporary pacemaker  Needs permanent pacemaker  Please see daily details in Physical Exam section and in Interval Changes Section  Changes in 
[facility] - Progress Note    Today's Date and Time: 8/9/2024, 6:44 AM    Impression :   Complete heart block   S/p temporary pacemaker placement on 8/3/24  NSTEMI likely type II  Concern for aspiration PNA vs pneumonitis  Lactic acidosis-resolved  Leukocytosis-resolved  Low grade fever  Acute on chronic CHF   TAMARA on CKD stage III  CAD s/p PCI in 2018  DM II  Pulmonary HTN  Advanced Parkinson's  GERD    Recommendations:   Rocephin 1 gm IV q 24 hr. Stop date 8-10-24  IV Rocephin and Flagyl were initiated on 8/3/24  Flagyl D/C on 8/5/24  Completed Azithromycin  Legionella,mycoplasma, strep pneumo and viral respiratory panel were negative  S/P PPM  placement on 8-7-24  Received Vancomycin 1250 mg IV x 1 for prophylaxis     Medical Decision Making/Summary/Discussion:8/9/2024     Daily Elements of Decision Making provided by Consulting Physician:    Note: I have independently performed the steps listed below as part of the medical decision making and evaluation.    Review of current Problems:  Today I am seeing the patient for the following problems:  Complete heart block   S/p temporary pacemaker placement on 8/3/24  NSTEMI likely type II  Concern for aspiration PNA vs pneumonitis  Lactic acidosis-resolved  Leukocytosis-resolved  Low grade fever  Acute on chronic CHF   TAMARA on CKD stage III  CAD s/p PCI in 2018  DM II  Pulmonary HTN  Advanced Parkinson's  GERD  Evaluation of Patient:  Patient evaluated and examined in the ICU.  Acute on chronic CHF   TAMARA on CKD stage III  Evaluated for fevers, leucocytosis. Concern for aspiration  S/P PPM placement 8-7-24  Please see daily details in Interval Changes Section  Changes in physical exam:  Confusion, tremors  CHF  Complete heart block with temporary pacemaker  Received permanent pacemaker 8-7-24  Please see daily details in Physical Exam section and in Interval Changes Section  Changes in ROS:  Unchanged  Please see daily details in 
[facility] - Progress Note    Today's Date and Time: 8/10/2024, 11:12 AM    Impression :   Complete heart block   S/p temporary pacemaker placement on 8/3/24  NSTEMI likely type II  RLL aspiration PNA vs pneumonitis  Lactic acidosis-resolved  Leukocytosis-resolved  Low grade fever  Acute on chronic CHF   TAMARA on CKD stage III  CAD s/p PCI in 2018  DM II  Pulmonary HTN  Advanced Parkinson's  GERD    Recommendations:   Rocephin 1 gm IV q 24 hr.  8/3 -8/10  S/P PPM  placement on 8-7-24  Received Vancomycin 1250 mg IV x 1 for prophylaxis       Infection Control Recommendations   Warner Precautions  Contact Isolation  Mcdowell Catheter    Antimicrobial Stewardship Recommendations     Simplification of therapy  Targeted therapy  PK dosing    Chief complaint/reason for consultation:   Clearance for PPM      History of Present Illness:   Mina Gill is a 84 y.o.-year-old male who was initially admitted on 8/3/2024. Patient seen at the request of Dr. Zazueta    INITIAL HISTORY:    This patient, with a history of bradycardia with type I heart block, CAD s/p PCI in 2018, DM II, pulmonary hypertension, Parkinson's, CHF with preserved EF and CKD III, presented to the ED on 8/3/24 with complaints of chest pain, SOB, nausea and upper abdominal pain.     His vital signs revealed a fever of 101.7 F, /48 and he was found to be in complete heart block.     He was given atropine, dopamine and a temporary pacemaker was placed.    Initial lab-work was significant for creatinine of 2.5, lactic acid 4.4, troponin 55, BNP 11,168, WBC 12.2. CRP is elevated at 254.    CXR showed fluid overload with possible lower lobe infiltrates.     His oxygenation has worsened and he is now on a hi-flow [place] NC at 10 L.     He was started on Azithromycin, Rocephin and Flagyl for concern of aspiration PNA.    Mycoplasma, legionella, strep pneumo and viral PCR panel were negative.   There was no growth on blood or 
NEUROLOGY INPATIENT PROGRESS NOTE    8/5/2024         Current Exam:     Chart reviewed. Discussed with RN. Patient was up much of the night. He was very agitated, now has bilateral mitts in place. Planning for pacer placement on Wednesday. He is lethargic and exam is limited but there is reportedly some improvement in his tremor since being re-initiated on his medications.         Brief History:    Mina Gill is a  83 y.o. male with H/O HTN, CKD, CAD, Parkinson's disease, who was admitted on 8/3/2024 with complaints of progressive fatigue with intermittent chest pain and shortness of breath.  He was found to be bradycardic in the 30s.  He was having some swallowing difficulties and not taking his medication for Parkinson's for the prior few days.  He was noted to be having increased tremors for which neurology was consulted.  Patient's home meds include Sinemet 25/100 4 times daily, entacapone 200 mg 4 times daily, and Requip 0.5 mg 3 times daily.  He was resumed on these medications with improvement in his symptoms.         No current facility-administered medications on file prior to encounter.     Current Outpatient Medications on File Prior to Encounter   Medication Sig Dispense Refill    pantoprazole (PROTONIX) 20 MG tablet Take 1 tablet by mouth daily 90 tablet 0    sucralfate (CARAFATE) 1 GM tablet TAKE ONE (1) TABLET BY MOUTH FOUR (4) TIMES DAILY 120 tablet 0    isosorbide mononitrate (IMDUR) 30 MG extended release tablet TAKE ONE (1) TABLET BY MOUTH DAILY 90 tablet 0    amitriptyline (ELAVIL) 10 MG tablet TAKE ONE (1) TABLET BY MOUTH AT BEDTIME FOR DEPRESSION 90 tablet 0    entacapone (COMTAN) 200 MG tablet TAKE ONE (1) TABLET BY MOUTH FOUR (4) TIMES DAILY FOR PARKINSONS 360 tablet 0    carbidopa-levodopa (SINEMET)  MG per tablet TAKE ONE (1) TABLET BY MOUTH FOUR (4) TIMES DAILY 360 tablet 0    allopurinol (ZYLOPRIM) 100 MG tablet TAKE ONE (1) TABLET BY MOUTH ONCE DAILY FOR GOUT 90 tablet 0    
[facility]   Progress Note                   Date:   8/4/2024  Patient name: Mina Gill  Date of admission:  8/3/2024  6:27 AM  MRN:   [id number]  YOB: 1940  PCP: Tisha Glasgow MD    Reason for Admission: Fatigue, chest pain    Subjective:       The patient was seen and evaluated at bedside, has a temporary pacer in place, in paced rhythm.  Hemodynamically stable.  Labs and imaging reviewed.  Creatinine 2.1.  Troponin 55-58-45.  He has been febrile with Tmax\" 101 F.    Medications:   Scheduled Meds:   sodium chloride flush  5-40 mL IntraVENous 2 times per day    carbidopa-levodopa  1 tablet Oral 4x Daily    entacapone  200 mg Oral 4x Daily    rOPINIRole  0.5 mg Oral TID    sucralfate  1 g Oral 4 times per day    gabapentin  100 mg Oral Daily    cefTRIAXone (ROCEPHIN) IV  1,000 mg IntraVENous Q24H    metroNIDAZOLE  500 mg IntraVENous Q8H    azithromycin  500 mg IntraVENous Q24H    pantoprazole (PROTONIX) 40 mg in sodium chloride (PF) 0.9 % 10 mL injection  40 mg IntraVENous Daily    sodium chloride flush  5-40 mL IntraVENous 2 times per day    amitriptyline  10 mg Oral Nightly    heparin (porcine)  5,000 Units SubCUTAneous 3 times per day       Continuous Infusions:   sodium chloride 5 mL/hr at 08/04/24 0622    sodium chloride         CBC:   Recent Labs     08/03/24  0649 08/04/24  0252   WBC 12.2* 13.1*   HGB 13.3 13.3    141     BMP:    Recent Labs     08/03/24  0649 08/03/24  1123 08/04/24  0252   * 131* 134*   K 4.4 4.9 4.5   CL 99 100 102   CO2 19* 16* 19*   BUN 40* 41* 39*   CREATININE 2.5* 2.6* 2.1*   GLUCOSE 174* 176* 141*     Hepatic:   Recent Labs     08/03/24  0649 08/04/24  0252   AST 22 17   ALT 26 12   BILITOT 1.7* 1.6*   ALKPHOS 93 81     INR:   Recent Labs     08/03/24  0649   INR 1.2       Objective:   Vitals: /65   Pulse 61   Temp (!) 101.5 °F (38.6 °C)   Resp 19   Ht 1.702 m (5' 7\")   Wt 90.8 kg (200 lb 2.8 oz)   SpO2 95%   BMI 31.35 
[facility]   Progress Note                   Date:   8/7/2024  Patient name: Mina Gill  Date of admission:  8/3/2024  6:27 AM  MRN:   [id number]  YOB: 1940  PCP: Tisha Glasgow MD    Reason for Admission: Fatigue, chest pain    Subjective:       Patient seen and evaluated at bedside. No acute events overnight.  Patient had dual-chamber pacemaker implantation yesterday. PPM site looks clean.  Awake and alert, hemodynamically stable maintaining oxygen saturation on 2 L via nasal cannula.   UOP 2.4 L / 24 hours.    Medications:   Scheduled Meds:   vancomycin (VANCOCIN) 1,000 mg in sod chloride IRR soln 0.9 % 1,000 mL irrigation   Irrigation Once    sodium chloride flush  5-40 mL IntraVENous 2 times per day    furosemide  40 mg Per NG tube Daily    melatonin  3 mg Oral Nightly    sodium chloride flush  5-40 mL IntraVENous 2 times per day    carbidopa-levodopa  1 tablet Oral 4x Daily    entacapone  200 mg Oral 4x Daily    rOPINIRole  0.5 mg Oral TID    sucralfate  1 g Oral 4 times per day    gabapentin  100 mg Oral Daily    cefTRIAXone (ROCEPHIN) IV  1,000 mg IntraVENous Q24H    pantoprazole (PROTONIX) 40 mg in sodium chloride (PF) 0.9 % 10 mL injection  40 mg IntraVENous Daily    sodium chloride flush  5-40 mL IntraVENous 2 times per day    amitriptyline  10 mg Oral Nightly    [Held by provider] heparin (porcine)  5,000 Units SubCUTAneous 3 times per day       Continuous Infusions:   sodium chloride      lactated ringers IV soln 50 mL/hr at 08/07/24 1855    sodium chloride Stopped (08/04/24 1113)    sodium chloride 5 mL/hr at 08/07/24 1855       CBC:   Recent Labs     08/05/24  0327 08/06/24  0505 08/07/24  1012   WBC 8.3 9.4 7.7   HGB 13.0 13.9 15.3    181 185       BMP:    Recent Labs     08/05/24  0327 08/06/24  0505 08/07/24  1012   * 136 136   K 3.9 4.1 4.0    103 104   CO2 20 21 18*   BUN 38* 34* 36*   CREATININE 1.7* 1.3* 1.4*   GLUCOSE 136* 158* 139* 
[facility]   Progress Note                   Date:   8/7/2024  Patient name: Mina Gill  Date of admission:  8/3/2024  6:27 AM  MRN:   [id number]  YOB: 1940  PCP: Tisha Glasgow MD    Reason for Admission: Fatigue, chest pain    Subjective:       Patient seen and evaluated at bedside. No acute events overnight.   Awake and alert, hemodynamically stable maintaining oxygen saturation on 2 L via nasal cannula. Currently in ventricular paced rhythm at 60.    Tube feeds held overnight, UOP 1.9 L / 24 hours.  Cleared by ID for PPM, vancomycin 1250 mg IV x 1 to be given at that time.  Currently on ceftriaxone for pneumonia.    Medications:   Scheduled Meds:   furosemide  40 mg Per NG tube Daily    melatonin  3 mg Oral Nightly    sodium chloride flush  5-40 mL IntraVENous 2 times per day    carbidopa-levodopa  1 tablet Oral 4x Daily    entacapone  200 mg Oral 4x Daily    rOPINIRole  0.5 mg Oral TID    sucralfate  1 g Oral 4 times per day    gabapentin  100 mg Oral Daily    cefTRIAXone (ROCEPHIN) IV  1,000 mg IntraVENous Q24H    pantoprazole (PROTONIX) 40 mg in sodium chloride (PF) 0.9 % 10 mL injection  40 mg IntraVENous Daily    sodium chloride flush  5-40 mL IntraVENous 2 times per day    amitriptyline  10 mg Oral Nightly    [Held by provider] heparin (porcine)  5,000 Units SubCUTAneous 3 times per day       Continuous Infusions:   lactated ringers IV soln      lactated ringers IV soln 50 mL/hr at 08/07/24 0103    sodium chloride Stopped (08/04/24 1113)    sodium chloride 5 mL/hr at 08/06/24 0529       CBC:   Recent Labs     08/05/24  0327 08/06/24  0505   WBC 8.3 9.4   HGB 13.0 13.9    181       BMP:    Recent Labs     08/05/24  0327 08/06/24  0505   * 136   K 3.9 4.1    103   CO2 20 21   BUN 38* 34*   CREATININE 1.7* 1.3*   GLUCOSE 136* 158*       Hepatic:   Recent Labs     08/05/24  0327 08/06/24  0505   AST 20 23   ALT 8* 10   BILITOT 0.6 0.6   ALKPHOS 83 85 
[facility]   Progress Note                   Date:   8/8/2024  Patient name: Mina Gill  Date of admission:  8/3/2024  6:27 AM  MRN:   [id number]  YOB: 1940  PCP: Tisha Glasgow MD    Reason for Admission: Fatigue, chest pain    Subjective:       Patient seen and evaluated at bedside. No acute events overnight.  Patient had dual-chamber pacemaker implantationPPM site looks clean.  Awake and alert, hemodynamically stable maintaining oxygen saturation on 2 L via nasal cannula.       Medications:   Scheduled Meds:   vancomycin (VANCOCIN) 1,000 mg in sod chloride IRR soln 0.9 % 1,000 mL irrigation   Irrigation Once    sodium chloride flush  5-40 mL IntraVENous 2 times per day    furosemide  40 mg Per NG tube Daily    melatonin  3 mg Oral Nightly    sodium chloride flush  5-40 mL IntraVENous 2 times per day    carbidopa-levodopa  1 tablet Oral 4x Daily    entacapone  200 mg Oral 4x Daily    rOPINIRole  0.5 mg Oral TID    sucralfate  1 g Oral 4 times per day    gabapentin  100 mg Oral Daily    cefTRIAXone (ROCEPHIN) IV  1,000 mg IntraVENous Q24H    pantoprazole (PROTONIX) 40 mg in sodium chloride (PF) 0.9 % 10 mL injection  40 mg IntraVENous Daily    sodium chloride flush  5-40 mL IntraVENous 2 times per day    amitriptyline  10 mg Oral Nightly    [Held by provider] heparin (porcine)  5,000 Units SubCUTAneous 3 times per day       Continuous Infusions:   sodium chloride      sodium chloride Stopped (08/08/24 1530)    sodium chloride Stopped (08/08/24 0325)       CBC:   Recent Labs     08/06/24  0505 08/07/24  1012 08/08/24  0215   WBC 9.4 7.7 6.6   HGB 13.9 15.3 13.8    185 181       BMP:    Recent Labs     08/06/24  0505 08/07/24  1012 08/08/24  0215    136 138   K 4.1 4.0 4.0    104 104   CO2 21 18* 21   BUN 34* 36* 36*   CREATININE 1.3* 1.4* 1.4*   GLUCOSE 158* 139* 120*       Hepatic:   Recent Labs     08/06/24  0505   AST 23   ALT 10   BILITOT 0.6   ALKPHOS 85 
Nutrition Assessment     Type and Reason for Visit: Reassess    Nutrition Recommendations/Plan:   Continue current diet as tolerated  Monitor PO intake, wt, labs, BM, edema     Malnutrition Assessment:  Malnutrition Status: At risk for malnutrition (Comment)    Nutrition Assessment:  Passed MBSS 8/8, NG removed and diet advanced to pureed cardiac diet. Pt tolerating diet. Pt stated his appetite is good and improved. RN stated he ate 100% of breakfast this morning. Pt has not had any wt loss and stated  lbs -  lbs. Na 135. LBM 8/10, 5% wt loss since admission 8/3. Upper extremity edema.    Estimated Daily Nutrient Needs:  Energy (kcal):  8748-0533 kcal/d Weight Used for Energy Requirements: Admission     Protein (g):   gm pro/day Weight Used for Protein Requirements: Ideal        Fluid (ml/day):  per MD Method Used for Fluid Requirements: Other (Comment)    Nutrition Related Findings:   Labs.Meds reviewed Wound Type: Surgical Incision    Current Nutrition Therapies:    ADULT DIET; Dysphagia - Pureed; Low Fat/Low Chol/High Fiber/OMKAR; Mildly Thick (Nectar)    Anthropometric Measures:  Height: 170.2 cm (5' 7.01\")  Current Body Wt: 86.2 kg (190 lb)   BMI: 29.8    Nutrition Diagnosis:   No nutrition diagnosis at this time     Nutrition Interventions:   Food and/or Nutrient Delivery: Continue Current Diet  Nutrition Education/Counseling: No recommendation at this time  Coordination of Nutrition Care: Continue to monitor while inpatient  Plan of Care discussed with: RN, pt    Goals:  Previous Goal Met: Goal(s) Achieved  Goals: Meet at least 75% of estimated needs, prior to discharge       Nutrition Monitoring and Evaluation:   Behavioral-Environmental Outcomes: None Identified  Food/Nutrient Intake Outcomes: Food and Nutrient Intake, Diet Advancement/Tolerance  Physical Signs/Symptoms Outcomes: Biochemical Data, Chewing or Swallowing, GI Status, Fluid Status or Edema, Weight, Skin, Hemodynamic 
Occupational Therapy    Facility/Department: [facility] RENAL//MED SURG   Occupational Therapy Initial Evaluation    Patient Name: Mina Gill        MRN: [id number]    : 1940    Date of Service: 2024    Chief Complaint   Patient presents with    Chest Pain    Shortness of Breath     Discharge Recommendations  Discharge Recommendations: Patient would benefit from continued therapy after discharge    OT Equipment Recommendations  Equipment Needed: Yes  Other: If patient discharges home at current level of function recomend new hospital bed with railing (current hospital bed donated, no rails) and wheelchair for safety, this is based on performance this date, with increased independence may not need     Assessment  Performance deficits / Impairments: Decreased functional mobility ;Decreased endurance;Decreased coordination;Decreased ADL status;Decreased ROM;Decreased balance;Decreased strength;Decreased safe awareness  Assessment: Patient lives with private caregiver and his other roommate, normally Min Assist to CGA with basic personal tasks and Max Assist with bathing, ambulates modified indep with cane, currently patient is requiring mod to max A with ADLs and mobility, pt is not expected to be able to return to previous living arrangement at  this time as he requires 24/7 skilled care and occupational therapy intervention  Prognosis: Fair  Decision Making: High Complexity  REQUIRES OT FOLLOW-UP: Yes  Activity Tolerance  Activity Tolerance: Patient limited by fatigue;Patient limited by pain  Safety Devices  Type of Devices: Call light within reach;Nurse notified;Gait belt;Patient at risk for falls;All fall risk precautions in place;Left in bed;Bed alarm in place  Restraints  Restraints Initially in Place: No    Restrictions/Precautions  Restrictions/Precautions  Restrictions/Precautions: Weight Bearing;ROM Restrictions;Bed Alarm;Fall Risk;Contact Precautions  Required Braces or Orthoses?: Yes  Upper Extremity 
Occupational Therapy  Facility/Department: [facility] RENAL//MED SURG   Daily Treatment Note      Patient Name: Mina Gill        MRN: [id number]    : 1940    Date of Service: 2024    Discharge Recommendations  Discharge Recommendations: Patient would benefit from continued therapy after discharge       Assessment  Performance deficits / Impairments: Decreased functional mobility ;Decreased endurance;Decreased coordination;Decreased ADL status;Decreased ROM;Decreased balance;Decreased strength;Decreased safe awareness  Assessment: Patient lives with private caregiver and his other roommate, normally Min Assist to CGA with basic personal tasks and Max Assist with bathing, ambulates modified indep with cane. Currently patient is requiring A with ADLs as stated below and MOD A x2 for mobility with RW. Pt is not expected to be able to return to previous living arrangement at  this time as he requires  skilled care and occupational therapy intervention  Prognosis: Fair  Activity Tolerance  Activity Tolerance: Patient limited by fatigue;Patient Tolerated treatment well;Treatment limited secondary to decreased cognition  Activity Tolerance Comments: tremors  Safety Devices  Type of Devices: Call light within reach;Nurse notified;Gait belt;All fall risk precautions in place;Left in chair  Restraints  Restraints Initially in Place: No    Restrictions/Precautions  Restrictions/Precautions  Restrictions/Precautions: Weight Bearing;ROM Restrictions;Fall Risk;Contact Precautions  Required Braces or Orthoses?: Yes (friend)  Upper Extremity Weight Bearing Restrictions  Left Upper Extremity Weight Bearing: Non Weight Bearing  Other: New PPM. Sling to be worn 72 hours from .   Sling removed. Nurse educated.  Position Activity Restriction  Other position/activity restrictions: Advanced Parkinson's disease with chest pain found complete heart block, s/p PPM  sling for 72hrs per RN per report from 
Patient showing signs of aspiration with persistent coughing and throat clearing after drinking. Physician notified and patient made NPO.  
Physical Medicine & Rehabilitation  Progress Note    8/14/2024 9:58 AM     CC: Ambulatory and ADL dysfunction due to complete heart block status post dual-chamber.  Pacemaker complicated by Parkinson    ID-concern for aspiration pneumonia versus pneumonitis-completed Rocephin through 8/10 status post pacemaker placement 8/7    Internal medicine-oropharyngeal dysphagia improved secondary Parkinson's on puréed low-fat low-cholesterol high-fiber mildly thick diet continue Parkinson's medication-avoid,    Subjective:   No complaints states he has to have a bowel movement nursing present continues with tremor    ROS:  Denies fevers, chills, sweats.  No chest pain, palpitations, lightheadedness.  Denies coughing, wheezing or shortness of breath.  Denies abdominal pain, nausea, diarrhea or constipation.  No new areas of joint pain.  Denies new areas of numbness or weakness.  Denies new anxiety or depression issues.  No new skin problems.    Rehabilitation:   PT:    Bed mobility  Supine to Sit: Moderate assistance  Sit to Supine: Unable to assess  Scooting: Moderate assistance  Bed Mobility Comments: pt able to assist with UE's    Transfers  Sit to Stand: Minimal Assistance, 2 Person Assistance  Stand to Sit: Minimal Assistance, 2 Person Assistance  Bed to Chair: Moderate assistance, 2 Person Assistance  Comment: able to sit to stand with Gustavo x 1 and not device, requires 2 assist to get to recliner due to pain in L knee    Ambulation  Surface: Level tile  Device: No Device  Assistance: Moderate assistance, 2 Person assistance  Distance: 2 small steps to recliner from bed  Comments: unable to attempt, c/o chest pain, knee pain and dizziness with standing and need to sit.  More Ambulation?: No       Bed mobility  Supine to Sit: Moderate assistance  Scooting: Moderate assistance  Bed Mobility Comments: pt able to assist with UE's  Transfers  Sit to Stand: Minimal Assistance;2 Person Assistance  Stand to Sit: Minimal 
Physical Therapy  Facility/Department: [facility] RENAL//MED SURG  Physical Therapy Progress Note    Name: Mina Gill  : 1940  MRN: [id number]  Date of Service: 2024    Discharge Recommendations:  Therapy recommended at discharge   PT Equipment Recommendations  Equipment Needed: No      Patient Diagnosis(es): The primary encounter diagnosis was Bradycardia. Diagnoses of Pneumonia of right lower lobe due to infectious organism, Septicemia (HCC), Hypervolemia, unspecified hypervolemia type, TAMARA (acute kidney injury) (HCC), 1st degree AV block, Complete heart block (HCC), and Acute kidney injury superimposed on CKD (HCC) were also pertinent to this visit.  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  Past Surgical History:  has a past surgical history that includes Colonoscopy (2012); shoulder surgery (Right); pr colsc flx w/rmvl of tumor polyp lesion snare tq (N/A, 2017); Colonoscopy (2017); Cholecystectomy, laparoscopic (N/A, 10/29/2022); Esophagogastroduodenoscopy (2023); Upper gastrointestinal endoscopy (N/A, 2023); Upper gastrointestinal endoscopy (N/A, 2023); Cardiac procedure (N/A, 2024); and Cardiac procedure (N/A, 8/3/2024).    Assessment   Body Structures, Functions, Activity Limitations Requiring Skilled Therapeutic Intervention: Decreased functional mobility ;Decreased strength;Decreased endurance;Decreased balance;Increased 
Physical Therapy  Facility/Department: [facility] RENAL//MED SURG  Physical Therapy Initial Assessment    Name: Mina Gill  : 1940  MRN: [id number]  Date of Service: 2024    Chief Complaint   Patient presents with    Chest Pain    Shortness of Breath       Discharge Recommendations:     Further therapy recommended at discharge.    PT Equipment Recommendations  Other: CTA, pt has RW and quad cane      Patient Diagnosis(es): The primary encounter diagnosis was Bradycardia. Diagnoses of Pneumonia of right lower lobe due to infectious organism, Septicemia (HCC), Hypervolemia, unspecified hypervolemia type, TAMARA (acute kidney injury) (HCC), 1st degree AV block, and Complete heart block (HCC) were also pertinent to this visit.  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  Past Surgical History:  has a past surgical history that includes Colonoscopy (2012); shoulder surgery (Right); pr colsc flx w/rmvl of tumor polyp lesion snare tq (N/A, 2017); Colonoscopy (2017); Cholecystectomy, laparoscopic (N/A, 10/29/2022); Esophagogastroduodenoscopy (2023); Upper gastrointestinal endoscopy (N/A, 2023); Upper gastrointestinal endoscopy (N/A, 2023); Cardiac procedure (N/A, 2024); and Cardiac procedure (N/A, 8/3/2024).    Assessment   Body Structures, Functions, Activity Limitations Requiring Skilled Therapeutic Intervention: Decreased functional mobility 
Renal Progress Note    Patient :  Mina Gill; 83 y.o. MRN# [id number]  Location:  3021/3021-01  Attending:  Delfino Zazueta MD  Admit Date:  8/3/2024   Hospital Day: 1      Subjective:     Following for TAMARA on CKD IIIb with baseline 1.4-1.6, admitted with fatigue and dyspnea, noted to have severe bradycardia requiring temporary PPM.  Creatinine on admission was 2.5 then 2.6.  Ultrasound of the kidney shows asymmetrical kidneys with left being 8.7 right being 9.5 cm.  He was given one dose of IV lasix 40mg X1 yesterday due to pulmonary congestion.  Urine output last 24 hours: 2860  Due to increased confusion NG placed and started on tube feeds.  Patient now restrained with mitts, as he had pulled out NG tube.  Temp pacer intact.  V paced rhythm.  Blood pressure stable  Labs this morniing:Na 134, K 4.5, Cl 102, CO2 19, BUN 39, Cr 2.1  CXR showed B infiltrates so started on Rocephin and azithromycin for concerns of aspiration pneumonia.  CXR this morning suggest CHF with small improvement from yesterday  Kappa Lambda ration 2.26, Compliment levels normal, total urine protein 41, urine creatinine 228      Outpatient Medications:     Medications Prior to Admission: pantoprazole (PROTONIX) 20 MG tablet, Take 1 tablet by mouth daily  sucralfate (CARAFATE) 1 GM tablet, TAKE ONE (1) TABLET BY MOUTH FOUR (4) TIMES DAILY  isosorbide mononitrate (IMDUR) 30 MG extended release tablet, TAKE ONE (1) TABLET BY MOUTH DAILY  amitriptyline (ELAVIL) 10 MG tablet, TAKE ONE (1) TABLET BY MOUTH AT BEDTIME FOR DEPRESSION  entacapone (COMTAN) 200 MG tablet, TAKE ONE (1) TABLET BY MOUTH FOUR (4) TIMES DAILY FOR PARKINSONS  carbidopa-levodopa (SINEMET)  MG per tablet, TAKE ONE (1) TABLET BY MOUTH FOUR (4) TIMES DAILY  allopurinol (ZYLOPRIM) 100 MG tablet, TAKE ONE (1) TABLET BY MOUTH ONCE DAILY FOR GOUT  ondansetron (ZOFRAN-ODT) 4 MG disintegrating tablet, Take 1 tablet by mouth every 8 hours as needed for Nausea or Vomiting  rOPINIRole 
Report called to [phone], All questions answered.   
SLP ALL NOTES  Facility/Department: [facility] CAR 3- MICU   CLINICAL BEDSIDE SWALLOW EVALUATION    NAME: Mina Gill  : 1940  MRN: [id number]    ADMISSION DATE: 8/3/2024  ADMITTING DIAGNOSIS: has Temporary loss of eyesight; Syncope : posterior circulation stroke vs Neurocardiogenic syncope ; Intracranial bleed : traumatic left sub-dural hematoma ,managed conservatively in ; Headache; CKD (chronic kidney disease) stage 3, GFR 30-59 ml/min ([facility]); Depression; Hyperlipidemia; Microhematuria; Microalbuminuria; Essential hypertension; Generalized abdominal pain; Tubular adenoma of colon; Diverticulosis of colon; History of skull fracture; Nausea; Pure hypercholesterolemia; Prediabetes; Parkinson disease ([facility]); Chronic post-traumatic headache, not intractable; Fall (on) (from) other stairs and steps, initial encounter; Strain of iliopsoas muscle, initial encounter; Chronic pain of left knee; Closed fracture of second toe of right foot; Closed fracture of second toe of left foot; Abnormal ECG; Cerebrovascular accident ([facility]); Chest pain, unspecified; Hematoma of scalp; Left ventricular hypertrophy; Mild aortic valve regurgitation; Pulmonary hypertension, mild ([facility]); Lumbar radiculopathy; Dizziness; Hyponatremia; Vomiting; General weakness; Overweight (BMI 25.0-29.9); Frequent falls; Bradycardia; Unspecified inflammatory spondylopathy, 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[facility]); Stage 3b chronic kidney disease (CKD) ([facility]); Chronic pain of multiple joints; Multiple comorbid conditions; Gout; Nerve pain; NSTEMI (non-ST elevated myocardial infarction) ([facility]); History of subdural hematoma; 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 
SLP ALL NOTES  Speech Language Pathology  [facility]    Dysphagia Treatment Note    Date: 8/13/2024  Patient’s Name: Mina Gill  MRN: [id number]  Diagnosis:   Patient Active Problem List   Diagnosis Code    Temporary loss of eyesight H53.129    Syncope : posterior circulation stroke vs Neurocardiogenic syncope  R55    Intracranial bleed : traumatic left sub-dural hematoma ,managed conservatively in 2011 I62.9    Headache R51.9    CKD (chronic kidney disease) stage 3, GFR 30-59 ml/min ([facility]) N18.30    Depression F32.A    Hyperlipidemia E78.5    Microhematuria R31.29    Microalbuminuria R80.9    Essential hypertension I10    Generalized abdominal pain R10.84    Tubular adenoma of colon D12.6    Diverticulosis of colon K57.30    History of skull fracture Z87.81    Nausea R11.0    Pure hypercholesterolemia E78.00    Prediabetes R73.03    Parkinson disease ([facility]) G20.A1    Chronic post-traumatic headache, not intractable G44.329    Fall (on) (from) other stairs and steps, initial encounter W10.8XXA    Strain of iliopsoas muscle, initial encounter S76.819A    Chronic pain of left knee M25.562, G89.29    Closed fracture of second toe of right foot S92.501A    Closed fracture of second toe of left foot S92.502A    Abnormal ECG R94.31    Cerebrovascular accident (Formerly [facility]) I63.9    Chest pain, unspecified R07.9    Hematoma of scalp S00.03XA    Left ventricular hypertrophy I51.7    Mild aortic valve regurgitation I35.1    Pulmonary hypertension, mild (Formerly [facility]) I27.20    Lumbar radiculopathy M54.16    Dizziness R42    Hyponatremia E87.1    Vomiting R11.10    General weakness R53.1    Overweight (BMI 25.0-29.9) E66.3    Frequent falls R29.6    Unspecified inflammatory spondylopathy, 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Formerly [facility]) M46.96    Stage 3b chronic kidney disease (CKD) (Formerly [facility]) N18.32    Chronic pain of multiple joints M25.50, G89.29    Multiple comorbid conditions R69    Gout M10.9    Nerve pain M79.2    NSTEMI (non-ST elevated myocardial 
SLP ALL NOTES  Speech Language Pathology  [facility]    Dysphagia Treatment Note    Date: 8/9/2024  Patient’s Name: Mina Gill  MRN: [id number]  Diagnosis:   Patient Active Problem List   Diagnosis Code    Temporary loss of eyesight H53.129    Syncope : posterior circulation stroke vs Neurocardiogenic syncope  R55    Intracranial bleed : traumatic left sub-dural hematoma ,managed conservatively in 2011 I62.9    Headache R51.9    CKD (chronic kidney disease) stage 3, GFR 30-59 ml/min ([facility]) N18.30    Depression F32.A    Hyperlipidemia E78.5    Microhematuria R31.29    Microalbuminuria R80.9    Essential hypertension I10    Generalized abdominal pain R10.84    Tubular adenoma of colon D12.6    Diverticulosis of colon K57.30    History of skull fracture Z87.81    Nausea R11.0    Pure hypercholesterolemia E78.00    Prediabetes R73.03    Parkinson disease ([facility]) G20.A1    Chronic post-traumatic headache, not intractable G44.329    Fall (on) (from) other stairs and steps, initial encounter W10.8XXA    Strain of iliopsoas muscle, initial encounter S76.819A    Chronic pain of left knee M25.562, G89.29    Closed fracture of second toe of right foot S92.501A    Closed fracture of second toe of left foot S92.502A    Abnormal ECG R94.31    Cerebrovascular accident ([facility]) I63.9    Chest pain, unspecified R07.9    Hematoma of scalp S00.03XA    Left ventricular hypertrophy I51.7    Mild aortic valve regurgitation I35.1    Pulmonary hypertension, mild ([facility]) I27.20    Lumbar radiculopathy M54.16    Dizziness R42    Hyponatremia E87.1    Vomiting R11.10    General weakness R53.1    Overweight (BMI 25.0-29.9) E66.3    Frequent falls R29.6    Bradycardia R00.1    Unspecified inflammatory spondylopathy, 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 
SLP eval done.  Puree and nectar thickener with water recommended.    
Spiritual Health Assessment/Progress Note  [facility]    Spiritual/Emotional Needs,  ,  ,      Name: Mina Gill MRN: [id number]    Age: 83 y.o.     Sex: male   Language: English   Pentecostal: Tenriism   Complete heart block (HCC)     Date: 8/3/2024            Total Time Calculated: (P) 20 min              Spiritual Assessment began in [facility] CAR 3- MICU        Referral/Consult From: (P) Nurse   Encounter Overview/Reason: Spiritual/Emotional Needs  Service Provided For: Patient    Melissa, Belief, Meaning:   Patient is connected with a melissa tradition or spiritual practice  Family/Friends No family/friends present      Importance and Influence:  Patient has no beliefs influential to healthcare decision-making identified during this visit  Family/Friends no family/friends present    Community:  Patient is connected with a spiritual community and feels well-supported. Support system includes: Children and Extended family  Family/Friends Other: Family not prewent    Assessment and Plan of Care:     Patient Interventions include: Facilitated expression of thoughts and feelings, Engaged in life review and/or legacy, and Other: Prayer  Family/Friends Interventions include: Other: Family not present    Patient Plan of Care: Spiritual Care available upon further referral  Family/Friends Plan of Care: Other: No family present    Electronically signed by Chaplain Mahad on 8/3/2024 at 9:14 PM   
Spiritual Health Assessment/Progress Note  [facility]    (P) Spiritual/Emotional Needs,  ,  ,      Name: Mina Gill MRN: [id number]    Age: 84 y.o.     Sex: male   Language: English   Tenriism: Samaritan   Complete heart block (HCC)     Date: 8/7/2024            Total Time Calculated: (P) 10 min              Spiritual Assessment continued in [facility] CAR 3- MICU        Referral/Consult From: (P) Nurse   Encounter Overview/Reason: (P) Spiritual/Emotional Needs  Service Provided For: (P) Patient and family together    Writer visited to provide prayer before procedure at request of patient. Pt, son, granddaughter and family friend present. Pt appeared to be in good spirits. When asked if he was nervous about the procedure he said no. Writer prayed at bedside with pt and family. Granddaughter became tearful. Writer introduced self as spiritual health provider. Writer offered space for pt and family to express feelings, needs and concerns. Writer provided support through active listening and words of affirmation.     Melissa, Belief, Meaning:   Patient is connected with a melissa tradition or spiritual practice  Family/Friends are connected with a melissa tradition or spiritual practice      Importance and Influence:  Patient Other: Did not discuss  Family/Friends Other: Did not discuss    Community:  Patient is connected with a spiritual community, [facility], sometimes  Family/Friends Other:      Assessment and Plan of Care:     Patient Interventions include: Other: Prayer before surgery  Family/Friends Interventions include:     Patient Plan of Care: Spiritual Care available upon further referral and Other: .  Family/Friends Plan of Care: Spiritual Care available upon further referral    Electronically signed by YESSY Solano on 8/7/2024 at 12:36 PM        08/07/24 3898   Encounter Summary   Encounter Overview/Reason Spiritual/Emotional Needs   Service Provided For Patient and family together 
assistance;2 Person assistance  Scooting: Moderate assistance  Transfers  Sit to Stand: 2 Person Assistance;Moderate Assistance;Minimal Assistance  Stand to Sit: Moderate Assistance;2 Person Assistance  Bed to Chair: Minimal assistance  Comment: At worst, patient needed mod assist with two staff.  At best, he was able to stand slowly with significant effort and cues, min A only.  Able to pivot to chair min A.           A/AROM Exercises: Right hip extensions in chair using therapist's foot under his foot x10 reps.  2 sets LAQ x10 reps B, ankle pumps x10 reps B. Left heel slides x10 reps.      AM-[facility] - Mobility    AM-PAC Basic Mobility - Inpatient   How much help is needed turning from your back to your side while in a flat bed without using bedrails?: A Little  How much help is needed moving from lying on your back to sitting on the side of a flat bed without using bedrails?: A Lot  How much help is needed moving to and from a bed to a chair?: Total  How much help is needed standing up from a chair using your arms?: Total  How much help is needed walking in hospital room?: Total  How much help is needed climbing 3-5 steps with a railing?: Total  AM-PAC Inpatient Mobility Raw Score : 9  AM-PAC Inpatient T-Scale Score : 30.55  Mobility Inpatient CMS 0-100% Score: 81.38  Mobility Inpatient CMS G-Code Modifier : CM       Goals  Short Term Goals  Time Frame for Short Term Goals: 14 visits  Short Term Goal 1: Pt will be SBA bed mobility  Short Term Goal 2: Pt will be Richelle transfers  Short Term Goal 3: Pt will be CGA amb 250' RW  Short Term Goal 4: Pt will navigate 5 steps B rail SBA       Education  Patient Education  Education Given To: Patient  Education Provided: Role of Therapy;Plan of Care;Precautions;Transfer Training;Fall Prevention Strategies  Education Method: Demonstration;Verbal  Education Outcome: Verbalized understanding;Demonstrated understanding      Therapy Time   Individual Concurrent Group Co-treatment 
08/05/24  0327   MG 2.3 2.6*         JONAH:      Lab Results   Component Value Date/Time    JONAH NEGATIVE 06/28/2021 12:05 PM     SPEP:  Lab Results   Component Value Date/Time    ALBCAL PENDING 08/03/2024 01:20 PM    ALBPCT PENDING 08/03/2024 01:20 PM    A1PCT PENDING 08/03/2024 01:20 PM    A2PCT PENDING 08/03/2024 01:20 PM    BETAPCT PENDING 08/03/2024 01:20 PM    GAMGLOB PENDING 08/03/2024 01:20 PM    GGPCT PENDING 08/03/2024 01:20 PM    PATH PENDING 08/03/2024 01:20 PM     C3:     Lab Results   Component Value Date/Time    C3 119 08/03/2024 01:20 PM     C4:     Lab Results   Component Value Date/Time    C4 21 08/03/2024 01:20 PM     Hep BsAg:         Lab Results   Component Value Date/Time    HEPBSAG NONREACTIVE 07/30/2012 12:10 PM     Hep C AB:          Lab Results   Component Value Date/Time    HEPCAB NONREACTIVE 07/30/2012 12:10 PM       Urinalysis/Chemistries:      Lab Results   Component Value Date/Time    NITRU NEGATIVE 08/04/2024 12:03 PM    COLORU Yellow 08/04/2024 12:03 PM    PHUR 5.5 08/04/2024 12:03 PM    PHUR 6.5 09/06/2023 09:05 AM    WBCUA 0 TO 2 08/04/2024 12:03 PM    RBCUA 20 TO 50 08/04/2024 12:03 PM    MUCUS NOT REPORTED 02/16/2022 12:09 PM    TRICHOMONAS NOT REPORTED 02/16/2022 12:09 PM    YEAST NOT REPORTED 02/16/2022 12:09 PM    BACTERIA None 08/04/2024 12:03 PM    CLARITYU Clear 11/07/2016 12:00 AM    LEUKOCYTESUR TRACE 08/04/2024 12:03 PM    UROBILINOGEN Normal 08/04/2024 12:03 PM    BILIRUBINUR NEGATIVE 08/04/2024 12:03 PM    BLOODU Positive 11/07/2016 12:00 AM    GLUCOSEU NEGATIVE 08/04/2024 12:03 PM    KETUA NEGATIVE 08/04/2024 12:03 PM    AMORPHOUS NOT REPORTED 02/16/2022 12:09 PM     Urine Osmolarity:   Lab Results   Component Value Date/Time    TISHA 224 08/09/2021 12:34 PM       Radiology:     FINDINGS:     Kidneys:     The right kidney measures 9.3 cm in length and the left kidney measures 8.7  cm in length.     Kidneys demonstrate normal cortical echogenicity.  No evidence 
the past medical history, past surgical history, medications, social history, and family history, and I have updated the database accordingly.  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) 3/18/2011    d/t being hurt on the job    CKD (chronic kidney disease) stage 2, GFR 60-89 ml/min     CKD (chronic kidney disease) stage 3, GFR 30-59 ml/min (HCC)     Diverticulosis of colon     GERD (gastroesophageal reflux disease)     History of non-ST elevation myocardial infarction (NSTEMI) 6/2022 10/27/2022    Impaired renal function     MRSA (methicillin resistant staph aureus) culture positive 9/23/2015    leg    Osteoarthritis of knee     Left    Parkinson disease (HCC)     Proteinuria     Skull fracture (HCC) 3/18/2011    d/t 12-foot drop on the job    Temporary loss of eyesight     periodically, happened x7    Tubular adenoma of colon 2012, 2017    mulitple       Past Surgical  History:     Past Surgical History:   Procedure Laterality Date    CARDIAC PROCEDURE N/A 4/2/2024    ali / Left heart cath / coronary angiography performed by Lobito Tracy MD at [facility] CARDIAC CATH LAB    CHOLECYSTECTOMY, LAPAROSCOPIC N/A 10/29/2022    LAPROSCOPIC CHOLECYSTECTOMY performed by Cj Valencia DO at [facility] OR    COLONOSCOPY  11/02/2012    poor prep, tubular adenoma    COLONOSCOPY  09/19/2017    diverticulosis, multiple polyps as described, spot marking done, pathology-tubular adenoma x 4    ESOPHAGOGASTRODUODENOSCOPY  01/09/2023    CO COLSC FLX W/RMVL OF TUMOR POLYP LESION SNARE TQ N/A 09/19/2017    COLONOSCOPY POLYPECTOMY SNARE/COLD BIOPSY with tatooing and apc transverse colon performed by Ross Garcia MD at [facility] OR    SHOULDER SURGERY Right     rotator cuff    UPPER GASTROINTESTINAL ENDOSCOPY N/A 1/9/2023    EGD ESOPHAGOGASTRODUODENOSCOPY performed by Constantine Brown MD at [facility] OR    UPPER GASTROINTESTINAL ENDOSCOPY N/A 8/14/2023    EGD 
    ASSESSMENT / PLAN:     Principal Problem:    Complete heart block (HCC)  Active Problems:    Chronic pain of multiple joints    NSTEMI (non-ST elevated myocardial infarction) (HCC)    Essential hypertension    Parkinson disease (HCC)    Pneumonia of right lung due to infectious organism    Acute kidney injury superimposed on CKD (HCC)    Acute heart failure (HCC)    Biventricular congestive heart failure (HCC)    Heart block AV complete (HCC)  Resolved Problems:    Bradycardia    Acute hypoxic respiratory failure (HCC)    Metabolic acidosis    Complete heart block (HCC)  Plan:   S/p permanent dual-chamber pacemaker implantation  EP recommended no long-term anticoagulation  Cardiology signed off  INR is 1.2     Aspiration pneumonia  Rocephin dose is completed yesterday.  No fever spikes reported.  Patient was seen by pulmonology yesterday and was mentioned to encourage incentive spirometry and Acapella.    Oropharyngeal dysphagia  Secondary to Parkinson's disease  SLP evaluated the patient along with modified barium swallow study  Patient is tolerating well puréed low-fat low-cholesterol high-fiber mildly thick diet.  Speech therapy is following the patient.       Parkinson's disease  Continue Sinemet regimen, entacapone and Requip.  Continue melatonin nightly  Avoid Klonopin.        DVT ppx : Heparin  GI ppx: Protonix  Diet: Dysphagia diet with soft diet and mildly thick liquid    PT/OT: On board  Discharge Planning / SW: Pending pre-CERT on Monday patient is excepted at [facility] rehab facility.    Nani Mazariegos MD  Internal Medicine Resident, PGY-1  [facility], [place], [place].  8/11/2024, 11:04 AM      
reviewed, no further ischemia workup recommended from cardiovascular at this time.  Hold all AV blocking agents at this time.  Monitor and replace electrolytes as needed to keep K> 4 and Mg> 2.  Follow-up limited 2D echocardiogram.  Continue rest of management as per primary/critical care team, we will to continue to follow.    Discussed with patient and nursing.     Darrick Bains MD.  Cardiovascular Fellow,   [facility], [place], [place].    I performed a history and physical examination of the patient and discussed management with the resident. I reviewed the resident’s note and agree with the documented findings and plan of care. Any areas of disagreement are noted on the chart. I was personally present for the key portions of any procedures. I have documented in the chart those procedures where I was not present during the key portions. I have personally evaluated this patient and have completed at least one if not all key elements of the E/M (history, physical exam, and MDM). Additional findings are as noted.    Lobito Tracy MD MD            
were answered to her satisfaction.  Use supplemental oxygen, as needed  Cxr reviewed.  On 8/8/2024 showed no pneumothorax or pneumomediastinum.  Patient had mild streaky atelectatic changes at the base of the right lung.  Please see the radiology report for details.  Patient will need a CT scan of the chest as an outpatient in 8 weeks  Diet reviewed  Thank you for having us involved in the care of your patient. Please call us if you have any questions or concerns.    Derick Quiñones MD, MD      8/9/2024, 8:12 AM    Pulmonary & Critical Care   
in the Last Year: Never true     Ran Out of Food in the Last Year: Never true   Transportation Needs: No Transportation Needs (8/3/2024)    PRAPARE - Transportation     Lack of Transportation (Medical): No     Lack of Transportation (Non-Medical): No   Physical Activity: Insufficiently Active (11/16/2023)    Exercise Vital Sign     Days of Exercise per Week: 1 day     Minutes of Exercise per Session: 10 min   Stress: Stress Concern Present (6/17/2022)    [employer] - Occupational Stress Questionnaire     Feeling of Stress : To some extent   Social Connections: Moderately Integrated (8/13/2024)    Social Connections ([facility] HRSN)     If for any reason you need help with day-to-day activities such as bathing, preparing meals, shopping, managing finances, etc., do you get the help you need?: Not on file   Intimate Partner Violence: Unknown (2/22/2024)    Received from [facility]    [place] Safety [place] Environment     Fear of Current or Ex-Partner: Not on file     Emotionally Abused: Not on file     Physically Abused: Not on file     Sexually Abused: Not on file     Physically or Sexually Abused: Not on file   Housing Stability: Low Risk  (8/3/2024)    Housing Stability Vital Sign     Unable to Pay for Housing in the Last Year: No     Number of Places Lived in the Last Year: 1     Unstable Housing in the Last Year: No       Family History:     Family History   Problem Relation Age of Onset    No Known Problems Mother     No Known Problems Father         Allergies:   Aspirin and Iodine     Review of Systems:   Constitutional: No fevers or chills. Confusion  Head: No headaches  Eyes: No double vision or blurry vision. No conjunctival inflammation.  ENT: No sore throat or runny nose.. No hearing loss, tinnitus or vertigo.  Cardiovascular: Temporary pacemaker. No chest pain or palpitations. shortness of breath.  METZ  Lung: shortness of breath. No sputum production  Abdomen: No nausea, 
dysuria. No hematuria. No suprapubic or CVA pain. [place]  Musculoskeletal: No muscle aches or pains. No joint effusions, swelling or deformities. Weakness, tremors  Hematologic: No bleeding or bruising.  Neurologic: Parkinson's with tremors  Integument: No rash, no ulcers.  Psychiatric: No depression.   Endocrine: No polyuria, no polydipsia, no polyphagia.    Physical Examination :   Patient Vitals for the past 8 hrs:   BP Temp Temp src Pulse Resp Weight   08/08/24 0730 -- 98.2 °F (36.8 °C) Axillary -- -- --   08/08/24 0522 -- -- -- -- -- 85.6 kg (188 lb 11.4 oz)   08/08/24 0521 -- -- -- -- -- 85.6 kg (188 lb 11.4 oz)   08/08/24 0400 135/77 99 °F (37.2 °C) Axillary 70 23 --       General Appearance: Awake, alert, and in respiratory distress  Head:  Normocephalic, no trauma  Eyes: Pupils equal, round, reactive to light and accommodation; extraocular movements intact; sclera anicteric; conjunctivae pink. No embolic phenomena.  ENT: Oropharynx clear, without erythema, exudate, or thrush. No tenderness of sinuses. Mouth/throat: mucosa pink and moist. No lesions. Dentures  Neck:Supple, without lymphadenopathy. Thyroid normal, No bruits.  Pulmonary/Chest: Diminished to auscultation, without wheezes, rales, or rhonchi.  No dullness to percussion.   Cardiovascular: Paced rhythm without murmurs, rubs, or gallops.   Abdomen: Soft, non tender. Bowel sounds normal. No organomegaly  All four Extremities: No cyanosis, clubbing, edema, or effusions.  Neurologic: Parkinson's with tremors. confusion  Skin: Warm and dry with good turgor.No signs of peripheral arterial or venous insufficiency. No ulcerations. No open wounds.    Medical Decision Making -Laboratory:   I have independently reviewed/ordered the following labs:    CBC with Differential:   Recent Labs     08/07/24  1012 08/08/24  0215   WBC 7.7 6.6   HGB 15.3 13.8   HCT 46.5 42.0    181   LYMPHOPCT 17* 18*   MONOPCT 9 10   EOSPCT 3 3       BMP:   Recent Labs     
Pulmonary hypertension.  - Multiple lung nodules.    PLAN:  -Monitor respiratory status, oxygen supplement as needed to maintain the SpO2 more than 90%.  Currently on room air.  - Continue antibiotic with Rocephin as per ID recommendation.  - Encourage incentive spirometry and Acapella.  - Diuretics as per cardiology team.  - DVT/GI prophylaxis.  - PT/OT.  - Rest of management as per primary team.  - Pulmonary team will continue to follow along.    Thank you for having us involved in the care of your patient. Please call us if you have any questions or concerns.  ALPHONSO CADENA MD  Pulmonary critical care attending physician  [facility]  Office phone number [phone]  8/10/2024, 9:15 AM  This note is created with the assistance of a speech recognition program.  While intent was to generate a document that actually reflects the content of the visit, the document can still have some errors including those of syntax and sound-alike substitutions which may escape proof reading.  It such instances, actual meaning can be extrapolated by contextual diversion.                                                                    
colon     Tubular adenoma of colon     Generalized abdominal pain 09/15/2017    Mild aortic valve regurgitation 08/31/2017    Pulmonary hypertension, mild ([facility]) 08/31/2017    Essential hypertension 03/23/2015    Hyperlipidemia 08/27/2014    Microhematuria 08/27/2014    Microalbuminuria 08/27/2014    Depression 04/07/2014    CKD (chronic kidney disease) stage 3, GFR 30-59 ml/min ([facility]) 03/04/2014    Intracranial bleed : traumatic left sub-dural hematoma ,managed conservatively in 2011 07/09/2013    Headache 07/09/2013    Temporary loss of eyesight           PLAN:     Neuro:  Advanced Parkinson's disease  Chronic back pain  Depression  Home meds: Gabapentin 100 mg daily, amitriptyline 10 mg nightly, ropinirole 0.5 mg 3 times daily, entacapone 200 mg 4 times daily, Sinemet 4 times daily.  Restarted patient's home medications  Neurology consulted for advanced Parkinson's disease with possible aspiration pneumonia.  SLP per neuro, otherwise melatonin for sleep, change sinemet     Resp:  Acute hypoxic respiratory failure  aspiration pneumonia versus pulmonary edema secondary to heart failure  Breathing on 12 L with salter  Chest x-ray showing bilateral pulmonary infiltrates right worse than left  Encourage incentive spirometry   On Flagyl, Rocephin, and azithromycin for pneumonia  Patient can also have pulmonary edema secondary to heart failure.     CV:  Complete heart block status post temporary pacemaker placement  Acute heart failure secondary to bradycardia  CAD status post LAD stent 2018  Patient had temporary pacemaker placement 8/3  No longer on dopamine drip  BMP 11,168  Trop 55 -> 58  Cardiology following  Continued on Plavix from home  Blood pressures after temporary pacemaker have been within tolerable limits  Patient started on Lasix with nephrology recommendations  TTE ordered, unremarkable  Patient is to remain flat while temporary pacemaker in place   Dopamine d/c with temporary pacemaker   
Parainfluenza 2 PCR Not Detected     Parainfluenza 3 PCR Not Detected     Parainfluenza 4 PCR Not Detected     Resp Syncytial Virus PCR Not Detected     Bordetella parapertussis by PCR Not Detected     B Pertussis by PCR Not Detected     Chlamydia pneumoniae By PCR Not Detected     Mycoplasma pneumo by PCR Not Detected     Comment: Performed by multiplexed nucleic acid assay.                 Medical Decision Making-Other:     Note:    Thank you for allowing us to participate in the care of this patient. Please call with questions.    MD Frank Trivedi MD  PGY-1 Internal Medicine    ATTESTATION:    I have discussed the case, including pertinent history and exam findings with the medical resident. I have evaluated the  History, physical findings and pictures of the patient and the key elements of the encounter have been performed by me. I have reviewed the laboratory data, other diagnostic studies and discussed them with the medical resident. I have updated the medical record where necessary.    I agree with the assessment, plan and orders as documented by the medical resident and I have modified them as necessary.       Mina William MD.      8/14/2024      Office: [phone]

## 2024-08-14 NOTE — TELEPHONE ENCOUNTER
SCHEDULE, AFL TCC DAVIDSON RN KEYA TCC TOLE AFL DAVIDSON C   10/29/2024  1:00 PM Abraham Cash MD Neuro [facility] Neurology -            Patient Active Problem List:     Temporary loss of eyesight     Syncope : posterior circulation stroke vs Neurocardiogenic syncope      Intracranial bleed : traumatic left sub-dural hematoma ,managed conservatively in 2011     Headache     CKD (chronic kidney disease) stage 3, GFR 30-59 ml/min ([facility])     Depression     Hyperlipidemia     Microhematuria     Microalbuminuria     Essential hypertension     Generalized abdominal pain     Tubular adenoma of colon     Diverticulosis of colon     History of skull fracture     Nausea     Pure hypercholesterolemia     Prediabetes     Parkinson disease ([facility])     Chronic post-traumatic headache, not intractable     Fall (on) (from) other stairs and steps, initial encounter     Strain of iliopsoas muscle, initial encounter     Chronic pain of left knee     Closed fracture of second toe of right foot     Closed fracture of second toe of left foot     Abnormal ECG     Cerebrovascular accident ([facility])     Chest pain, unspecified     Hematoma of scalp     Left ventricular hypertrophy     Mild aortic valve regurgitation     Pulmonary hypertension, mild ([facility])     Lumbar radiculopathy     Dizziness     Hyponatremia     Vomiting     General weakness     Overweight (BMI 25.0-29.9)     Frequent falls     Unspecified inflammatory spondylopathy, 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[facility])     Stage 3b chronic kidney disease (CKD) ([facility])     Chronic pain of multiple joints     Multiple comorbid conditions     Gout     Nerve pain     NSTEMI (non-ST elevated myocardial infarction) ([facility])     History of subdural hematoma     Coronary artery disease involving native heart with angina pectoris ([facility])     Lumbar facet arthropathy     Spinal stenosis of lumbar region without neurogenic claudication     Generalized weakness     Lumbosacral spondylosis without myelopathy     Gallstone pancreatitis

## 2024-08-14 NOTE — PLAN OF CARE
Problem: Discharge Planning  Goal: Discharge to home or other facility with appropriate resources  8/10/2024 0433 by Kaitlynn Rivera, RN  Outcome: Progressing     
  Problem: Pain  Goal: Verbalizes/displays adequate comfort level or baseline comfort level  8/11/2024 0445 by Kaitlynn Rivera, RN  Outcome: Progressing     
  Problem: Respiratory - Adult  Goal: Achieves optimal ventilation and oxygenation  8/4/2024 2019 by Melissa Zavala RCP  Flowsheets (Taken 8/4/2024 2019)  Achieves optimal ventilation and oxygenation:   Respiratory therapy support as indicated   Assess for changes in respiratory status   Assess for changes in mentation and behavior   Oxygen supplementation based on oxygen saturation or arterial blood gases   Assess and instruct to report shortness of breath or any respiratory difficulty   Encourage broncho-pulmonary hygiene including cough, deep breathe, incentive spirometry     
  Problem: Respiratory - Adult  Goal: Achieves optimal ventilation and oxygenation  Flowsheets (Taken 8/3/2024 2041)  Achieves optimal ventilation and oxygenation:   Respiratory therapy support as indicated   Assess for changes in respiratory status   Assess for changes in mentation and behavior   Oxygen supplementation based on oxygen saturation or arterial blood gases   Assess and instruct to report shortness of breath or any respiratory difficulty   Encourage broncho-pulmonary hygiene including cough, deep breathe, incentive spirometry     
  Problem: Safety - Adult  Goal: Free from fall injury  8/12/2024 0617 by Radha Roman RN  Outcome: Progressing  8/11/2024 1758 by Clari Shane RN  Outcome: Progressing     Problem: Discharge Planning  Goal: Discharge to home or other facility with appropriate resources  8/12/2024 0617 by Radha Roman RN  Outcome: Progressing  8/11/2024 1758 by Clari Shane RN  Outcome: Progressing     Problem: Pain  Goal: Verbalizes/displays adequate comfort level or baseline comfort level  8/12/2024 0617 by Radha Roman RN  Outcome: Progressing  8/11/2024 1758 by Clari Shane RN  Outcome: Progressing     Problem: Respiratory - Adult  Goal: Achieves optimal ventilation and oxygenation  8/12/2024 0617 by Radha Roman RN  Outcome: Progressing  8/11/2024 1758 by Clari Shane RN  Outcome: Progressing     Problem: Skin/Tissue Integrity  Goal: Absence of new skin breakdown  Description: 1.  Monitor for areas of redness and/or skin breakdown  2.  Assess vascular access sites hourly  3.  Every 4-6 hours minimum:  Change oxygen saturation probe site  4.  Every 4-6 hours:  If on nasal continuous positive airway pressure, respiratory therapy assess nares and determine need for appliance change or resting period.  8/12/2024 0617 by Radha Roman RN  Outcome: Progressing  8/11/2024 1758 by Clari Shane RN  Outcome: Progressing     Problem: Nutrition Deficit:  Goal: Optimize nutritional status  8/12/2024 0617 by Radha Roman RN  Outcome: Progressing  8/11/2024 1758 by Clari Shane RN  Outcome: Progressing     Problem: Chronic Conditions and Co-morbidities  Goal: Patient's chronic conditions and co-morbidity symptoms are monitored and maintained or improved  8/12/2024 0617 by Radha Roman RN  Outcome: Progressing  8/11/2024 1758 by Clari Shane RN  Outcome: Progressing     Problem: ABCDS Injury Assessment  Goal: Absence of physical injury  8/12/2024 0617 by Radha Roman 
  Problem: Safety - Adult  Goal: Free from fall injury  8/12/2024 1746 by Annamaria Martínez RN  Outcome: Progressing  8/12/2024 0617 by Radha Roman RN  Outcome: Progressing     Problem: Discharge Planning  Goal: Discharge to home or other facility with appropriate resources  8/12/2024 1746 by Annamaria Martínez RN  Outcome: Progressing  8/12/2024 0617 by Radha Roman RN  Outcome: Progressing     Problem: Pain  Goal: Verbalizes/displays adequate comfort level or baseline comfort level  8/12/2024 1746 by Annamaria Martínez RN  Outcome: Progressing  8/12/2024 0617 by Radha Roman RN  Outcome: Progressing     Problem: Respiratory - Adult  Goal: Achieves optimal ventilation and oxygenation  8/12/2024 1746 by Annamaria Martínez RN  Outcome: Progressing  8/12/2024 0617 by Radha Roman RN  Outcome: Progressing     Problem: Skin/Tissue Integrity  Goal: Absence of new skin breakdown  Description: 1.  Monitor for areas of redness and/or skin breakdown  2.  Assess vascular access sites hourly  3.  Every 4-6 hours minimum:  Change oxygen saturation probe site  4.  Every 4-6 hours:  If on nasal continuous positive airway pressure, respiratory therapy assess nares and determine need for appliance change or resting period.  8/12/2024 1746 by Annamaria Martínez RN  Outcome: Progressing  8/12/2024 0617 by Radha Roman RN  Outcome: Progressing     Problem: Nutrition Deficit:  Goal: Optimize nutritional status  8/12/2024 1746 by Annamaria Martínez RN  Outcome: Progressing  8/12/2024 0617 by Radha Roman RN  Outcome: Progressing     Problem: Chronic Conditions and Co-morbidities  Goal: Patient's chronic conditions and co-morbidity symptoms are monitored and maintained or improved  8/12/2024 1746 by Annamaria Martínez RN  Outcome: Progressing  8/12/2024 0617 by Radha Roman RN  Outcome: Progressing     Problem: ABCDS Injury Assessment  Goal: Absence of physical injury  8/12/2024 1746 by Annamaria Martínez RN  Outcome: Progressing  8/12/2024 0617 by 
  Problem: Safety - Adult  Goal: Free from fall injury  8/13/2024 0107 by Yaya Banegas RN  Outcome: Progressing  8/12/2024 1746 by Annamaria Martínez RN  Outcome: Progressing     Problem: Discharge Planning  Goal: Discharge to home or other facility with appropriate resources  8/13/2024 0107 by Yaya Banegas RN  Outcome: Progressing  8/12/2024 1746 by Annamaria Martínez RN  Outcome: Progressing     Problem: Pain  Goal: Verbalizes/displays adequate comfort level or baseline comfort level  8/13/2024 0107 by Yaya Banegas RN  Outcome: Progressing  8/12/2024 1746 by Annamaria Martínez RN  Outcome: Progressing     Problem: Respiratory - Adult  Goal: Achieves optimal ventilation and oxygenation  8/13/2024 0107 by Yaya Banegas RN  Outcome: Progressing  8/12/2024 1746 by Annamaria Martínez RN  Outcome: Progressing     Problem: Skin/Tissue Integrity  Goal: Absence of new skin breakdown  Description: 1.  Monitor for areas of redness and/or skin breakdown  2.  Assess vascular access sites hourly  3.  Every 4-6 hours minimum:  Change oxygen saturation probe site  4.  Every 4-6 hours:  If on nasal continuous positive airway pressure, respiratory therapy assess nares and determine need for appliance change or resting period.  8/13/2024 0107 by Yaya Banegas RN  Outcome: Progressing  8/12/2024 1746 by Annamaria Martínez RN  Outcome: Progressing     Problem: Nutrition Deficit:  Goal: Optimize nutritional status  8/13/2024 0107 by Yaya Banegas RN  Outcome: Progressing  8/12/2024 1746 by Annamaria Martínez RN  Outcome: Progressing     Problem: Chronic Conditions and Co-morbidities  Goal: Patient's chronic conditions and co-morbidity symptoms are monitored and maintained or improved  8/13/2024 0107 by Yaya Banegas RN  Outcome: Progressing  8/12/2024 1746 by Annamaria Martínez RN  Outcome: Progressing     Problem: ABCDS Injury Assessment  Goal: Absence of physical injury  8/13/2024 0107 by Yaya Banegas RN  Outcome: 
  Problem: Safety - Adult  Goal: Free from fall injury  8/13/2024 1832 by Graciela Lopez RN  Outcome: Progressing     Problem: Discharge Planning  Goal: Discharge to home or other facility with appropriate resources  8/13/2024 1832 by Graciela Lopez RN  Outcome: Progressing     Problem: Pain  Goal: Verbalizes/displays adequate comfort level or baseline comfort level  8/13/2024 1832 by Graciela Lopez RN  Outcome: Progressing  8/13/2024 1830 by Graciela Lopez RN  Outcome: Progressing     
  Problem: Safety - Adult  Goal: Free from fall injury  8/14/2024 0102 by Yaya Banegas RN  Outcome: Progressing  8/13/2024 1832 by Graicela Lopez RN  Outcome: Progressing  8/13/2024 1830 by Graciela Lopez RN  Outcome: Progressing     Problem: Discharge Planning  Goal: Discharge to home or other facility with appropriate resources  8/14/2024 0102 by Yaya Banegas RN  Outcome: Progressing  8/13/2024 1832 by Graciela Lopez RN  Outcome: Progressing  8/13/2024 1830 by Graciela Lopez RN  Outcome: Progressing     Problem: Pain  Goal: Verbalizes/displays adequate comfort level or baseline comfort level  8/14/2024 0102 by Yaya Banegas RN  Outcome: Progressing  8/13/2024 1832 by Graciela Lopez RN  Outcome: Progressing  8/13/2024 1830 by Graciela Lopez RN  Outcome: Progressing     Problem: Respiratory - Adult  Goal: Achieves optimal ventilation and oxygenation  8/14/2024 0102 by Yaya Banegas RN  Outcome: Progressing  8/13/2024 1830 by Graciela Lopez RN  Outcome: Progressing     Problem: Skin/Tissue Integrity  Goal: Absence of new skin breakdown  Description: 1.  Monitor for areas of redness and/or skin breakdown  2.  Assess vascular access sites hourly  3.  Every 4-6 hours minimum:  Change oxygen saturation probe site  4.  Every 4-6 hours:  If on nasal continuous positive airway pressure, respiratory therapy assess nares and determine need for appliance change or resting period.  Outcome: Progressing     Problem: Nutrition Deficit:  Goal: Optimize nutritional status  Outcome: Progressing     Problem: Chronic Conditions and Co-morbidities  Goal: Patient's chronic conditions and co-morbidity symptoms are monitored and maintained or improved  Outcome: Progressing     Problem: ABCDS Injury Assessment  Goal: Absence of physical injury  Outcome: Progressing     
  Problem: Safety - Adult  Goal: Free from fall injury  8/4/2024 0336 by Kiley Snyder RN  Outcome: Progressing  8/3/2024 1849 by Shivam Guzman RN  Outcome: Progressing  Flowsheets (Taken 8/3/2024 1000)  Free From Fall Injury: Based on caregiver fall risk screen, instruct family/caregiver to ask for assistance with transferring infant if caregiver noted to have fall risk factors     Problem: Discharge Planning  Goal: Discharge to home or other facility with appropriate resources  8/4/2024 0336 by Kiley Snyder RN  Outcome: Progressing  8/3/2024 1849 by Shivam Guzman RN  Outcome: Progressing  Flowsheets (Taken 8/3/2024 0940)  Discharge to home or other facility with appropriate resources: Refer to discharge planning if patient needs post-hospital services based on physician order or complex needs related to functional status, cognitive ability or social support system     Problem: Pain  Goal: Verbalizes/displays adequate comfort level or baseline comfort level  8/4/2024 0336 by Kiley Snyder RN  Outcome: Progressing  8/3/2024 1849 by Shivam Guzman RN  Outcome: Progressing     Problem: Respiratory - Adult  Goal: Achieves optimal ventilation and oxygenation  8/4/2024 0336 by Kiley Snyder RN  Outcome: Progressing  8/3/2024 2041 by Melissa Zavala RCP  Flowsheets (Taken 8/3/2024 2041)  Achieves optimal ventilation and oxygenation:   Respiratory therapy support as indicated   Assess for changes in respiratory status   Assess for changes in mentation and behavior   Oxygen supplementation based on oxygen saturation or arterial blood gases   Assess and instruct to report shortness of breath or any respiratory difficulty   Encourage broncho-pulmonary hygiene including cough, deep breathe, incentive spirometry     
  Problem: Safety - Adult  Goal: Free from fall injury  8/5/2024 0127 by Kiley Snyder RN  Outcome: Progressing  8/5/2024 0126 by Kiley Snyder RN  Outcome: Progressing  8/4/2024 1854 by Shivam Guzman RN  Outcome: Progressing  Flowsheets (Taken 8/4/2024 0800)  Free From Fall Injury: Based on caregiver fall risk screen, instruct family/caregiver to ask for assistance with transferring infant if caregiver noted to have fall risk factors     Problem: Discharge Planning  Goal: Discharge to home or other facility with appropriate resources  8/5/2024 0127 by Kiley Snyder RN  Outcome: Progressing  8/5/2024 0126 by Kiley Snyder RN  Outcome: Progressing  8/4/2024 1854 by Shivam Guzman RN  Outcome: Progressing     Problem: Pain  Goal: Verbalizes/displays adequate comfort level or baseline comfort level  8/5/2024 0127 by Kiley Snyder RN  Outcome: Progressing  8/5/2024 0126 by Kiley Snyder RN  Outcome: Progressing  8/4/2024 1854 by Shivam Guzman RN  Outcome: Progressing  Flowsheets (Taken 8/4/2024 0800)  Verbalizes/displays adequate comfort level or baseline comfort level: Assess pain using appropriate pain scale     Problem: Respiratory - Adult  Goal: Achieves optimal ventilation and oxygenation  8/5/2024 0127 by Kiley Snyder RN  Outcome: Progressing  8/5/2024 0126 by Kiley Snyder RN  Outcome: Progressing  8/4/2024 2019 by Melissa Zavala, RAMON  Flowsheets (Taken 8/4/2024 2019)  Achieves optimal ventilation and oxygenation:   Respiratory therapy support as indicated   Assess for changes in respiratory status   Assess for changes in mentation and behavior   Oxygen supplementation based on oxygen saturation or arterial blood gases   Assess and instruct to report shortness of breath or any respiratory difficulty   Encourage broncho-pulmonary hygiene including cough, deep breathe, incentive spirometry  8/4/2024 1854 by Shivam Guzman RN  Outcome: Progressing     Problem: Skin/Tissue Integrity  Goal: Absence of new 
  Problem: Safety - Adult  Goal: Free from fall injury  8/6/2024 0918 by Yobani Padron RN  Outcome: Progressing  8/6/2024 0527 by Dickson Romano RN  Outcome: Progressing     Problem: Discharge Planning  Goal: Discharge to home or other facility with appropriate resources  8/6/2024 0918 by Yobani Padron RN  Outcome: Progressing  8/6/2024 0527 by Dickson Romano RN  Outcome: Progressing     Problem: Pain  Goal: Verbalizes/displays adequate comfort level or baseline comfort level  8/6/2024 0918 by Yobani Padron RN  Outcome: Progressing  8/6/2024 0527 by Dickson Romano RN  Outcome: Progressing  Flowsheets  Taken 8/6/2024 0400  Verbalizes/displays adequate comfort level or baseline comfort level: Assess pain using appropriate pain scale  Taken 8/5/2024 2000  Verbalizes/displays adequate comfort level or baseline comfort level: Assess pain using appropriate pain scale     Problem: Respiratory - Adult  Goal: Achieves optimal ventilation and oxygenation  8/6/2024 0918 by Yobani Padron RN  Outcome: Progressing  8/6/2024 0527 by Dickson Romano RN  Outcome: Progressing     Problem: Skin/Tissue Integrity  Goal: Absence of new skin breakdown  Description: 1.  Monitor for areas of redness and/or skin breakdown  2.  Assess vascular access sites hourly  3.  Every 4-6 hours minimum:  Change oxygen saturation probe site  4.  Every 4-6 hours:  If on nasal continuous positive airway pressure, respiratory therapy assess nares and determine need for appliance change or resting period.  8/6/2024 0918 by Yobani Padron RN  Outcome: Progressing  8/6/2024 0527 by Dickson Romano RN  Outcome: Progressing     Problem: Safety - Medical Restraint  Goal: Remains free of injury from restraints (Restraint for Interference with Medical Device)  Description: INTERVENTIONS:  1. Determine that other, less restrictive measures have been tried or would not be effective before applying the restraint  2. Evaluate the patient's 
  Problem: Safety - Adult  Goal: Free from fall injury  8/7/2024 0904 by Mary Melendrez RN  Outcome: Progressing     Problem: Discharge Planning  Goal: Discharge to home or other facility with appropriate resources  8/7/2024 0904 by Mary Melendrez RN  Outcome: Progressing     Problem: Pain  Goal: Verbalizes/displays adequate comfort level or baseline comfort level  8/7/2024 0904 by Mary Melendrez RN  Outcome: Progressing     Problem: Respiratory - Adult  Goal: Achieves optimal ventilation and oxygenation  8/7/2024 0904 by Mary Melendrez RN  Outcome: Progressing     Problem: Skin/Tissue Integrity  Goal: Absence of new skin breakdown  Description: 1.  Monitor for areas of redness and/or skin breakdown  2.  Assess vascular access sites hourly  3.  Every 4-6 hours minimum:  Change oxygen saturation probe site  4.  Every 4-6 hours:  If on nasal continuous positive airway pressure, respiratory therapy assess nares and determine need for appliance change or resting period.  8/7/2024 0904 by Mary Melendrez RN  Outcome: Progressing     
  Problem: Safety - Adult  Goal: Free from fall injury  8/8/2024 0839 by Mary Melendrez RN  Outcome: Progressing     Problem: Discharge Planning  Goal: Discharge to home or other facility with appropriate resources  8/8/2024 0839 by Mary Melendrez RN  Outcome: Progressing     Problem: Pain  Goal: Verbalizes/displays adequate comfort level or baseline comfort level  8/8/2024 0839 by Mary Melendrez RN  Outcome: Progressing     Problem: Respiratory - Adult  Goal: Achieves optimal ventilation and oxygenation  8/8/2024 0839 by Mary Melendrez RN  Outcome: Progressing     Problem: Skin/Tissue Integrity  Goal: Absence of new skin breakdown  Description: 1.  Monitor for areas of redness and/or skin breakdown  2.  Assess vascular access sites hourly  3.  Every 4-6 hours minimum:  Change oxygen saturation probe site  4.  Every 4-6 hours:  If on nasal continuous positive airway pressure, respiratory therapy assess nares and determine need for appliance change or resting period.  8/8/2024 0839 by Mary Melendrez RN  Outcome: Progressing     Problem: Safety - Medical Restraint  Goal: Remains free of injury from restraints (Restraint for Interference with Medical Device)  Description: INTERVENTIONS:  1. Determine that other, less restrictive measures have been tried or would not be effective before applying the restraint  2. Evaluate the patient's condition at the time of restraint application  3. Inform patient/family regarding the reason for restraint  4. Q2H: Monitor safety, psychosocial status, comfort, nutrition and hydration  8/8/2024 0839 by Mary Melendrez RN  Outcome: Completed     
  Problem: Safety - Adult  Goal: Free from fall injury  8/9/2024 0534 by Candie Florian RN  Outcome: Progressing  8/8/2024 1810 by Gabriel Otoole RN  Outcome: Progressing     Problem: Discharge Planning  Goal: Discharge to home or other facility with appropriate resources  8/9/2024 0534 by Candie Florian RN  Outcome: Progressing  8/8/2024 1810 by Gabriel Otoole RN  Outcome: Progressing     Problem: Pain  Goal: Verbalizes/displays adequate comfort level or baseline comfort level  8/9/2024 0534 by Candie Florian RN  Outcome: Progressing  8/8/2024 1810 by Gabriel Otoole RN  Outcome: Progressing     Problem: Respiratory - Adult  Goal: Achieves optimal ventilation and oxygenation  8/9/2024 0534 by Candie Florian RN  Outcome: Progressing  8/8/2024 1810 by Gabriel Otoole RN  Outcome: Progressing     Problem: Skin/Tissue Integrity  Goal: Absence of new skin breakdown  Description: 1.  Monitor for areas of redness and/or skin breakdown  2.  Assess vascular access sites hourly  3.  Every 4-6 hours minimum:  Change oxygen saturation probe site  4.  Every 4-6 hours:  If on nasal continuous positive airway pressure, respiratory therapy assess nares and determine need for appliance change or resting period.  8/9/2024 0534 by Candie Florian RN  Outcome: Progressing  8/8/2024 1810 by Gabriel Otoole RN  Outcome: Progressing     Problem: Nutrition Deficit:  Goal: Optimize nutritional status  8/9/2024 0534 by Candie Florian RN  Outcome: Progressing  8/8/2024 1810 by Gabriel Otoole RN  Outcome: Progressing  Flowsheets (Taken 8/8/2024 1344 by Amber Ochoa, RD, LD)  Nutrient intake appropriate for improving, restoring, or maintaining nutritional needs:   Assess nutritional status and recommend course of action   Recommend, monitor, and adjust tube feedings and TPN/PPN based on assessed needs   Monitor oral intake, labs, and treatment plans   Recommend appropriate diets, oral nutritional supplements, 
  Problem: Safety - Adult  Goal: Free from fall injury  8/9/2024 1708 by Garbiel Otoole RN  Outcome: Progressing  8/9/2024 0534 by Candie Florian RN  Outcome: Progressing     Problem: Discharge Planning  Goal: Discharge to home or other facility with appropriate resources  8/9/2024 1708 by Gabriel Otoole RN  Outcome: Progressing  8/9/2024 0534 by Candie Florian RN  Outcome: Progressing     Problem: Pain  Goal: Verbalizes/displays adequate comfort level or baseline comfort level  8/9/2024 1708 by Gabriel Otoole RN  Outcome: Progressing  8/9/2024 0534 by Candie Florian RN  Outcome: Progressing     Problem: Respiratory - Adult  Goal: Achieves optimal ventilation and oxygenation  8/9/2024 1708 by Gabriel Otoole RN  Outcome: Progressing  8/9/2024 0534 by Candie Florian RN  Outcome: Progressing     Problem: Skin/Tissue Integrity  Goal: Absence of new skin breakdown  Description: 1.  Monitor for areas of redness and/or skin breakdown  2.  Assess vascular access sites hourly  3.  Every 4-6 hours minimum:  Change oxygen saturation probe site  4.  Every 4-6 hours:  If on nasal continuous positive airway pressure, respiratory therapy assess nares and determine need for appliance change or resting period.  8/9/2024 1708 by Gabriel Otoole RN  Outcome: Progressing  8/9/2024 0534 by Candie Florian RN  Outcome: Progressing     Problem: Nutrition Deficit:  Goal: Optimize nutritional status  8/9/2024 1708 by Gabriel Otoole RN  Outcome: Progressing  8/9/2024 0534 by Candie Florian RN  Outcome: Progressing     Problem: Chronic Conditions and Co-morbidities  Goal: Patient's chronic conditions and co-morbidity symptoms are monitored and maintained or improved  8/9/2024 1708 by Gabriel Otoole RN  Outcome: Progressing  8/9/2024 0534 by Candie Florian RN  Outcome: Progressing     Problem: ABCDS Injury Assessment  Goal: Absence of physical injury  8/9/2024 1708 by Gabriel Otoole RN  Outcome: 
  Problem: Safety - Adult  Goal: Free from fall injury  Outcome: Progressing     Problem: Discharge Planning  Goal: Discharge to home or other facility with appropriate resources  8/10/2024 1647 by Clari Shane RN  Outcome: Progressing  8/10/2024 0433 by Kaitlynn Rivera, RN  Outcome: Progressing     Problem: Pain  Goal: Verbalizes/displays adequate comfort level or baseline comfort level  Outcome: Progressing     Problem: Respiratory - Adult  Goal: Achieves optimal ventilation and oxygenation  Outcome: Progressing     Problem: Skin/Tissue Integrity  Goal: Absence of new skin breakdown  Description: 1.  Monitor for areas of redness and/or skin breakdown  2.  Assess vascular access sites hourly  3.  Every 4-6 hours minimum:  Change oxygen saturation probe site  4.  Every 4-6 hours:  If on nasal continuous positive airway pressure, respiratory therapy assess nares and determine need for appliance change or resting period.  Outcome: Progressing     Problem: Nutrition Deficit:  Goal: Optimize nutritional status  Outcome: Progressing     Problem: Chronic Conditions and Co-morbidities  Goal: Patient's chronic conditions and co-morbidity symptoms are monitored and maintained or improved  Outcome: Progressing     Problem: ABCDS Injury Assessment  Goal: Absence of physical injury  Outcome: Progressing     
  Problem: Safety - Adult  Goal: Free from fall injury  Outcome: Progressing     Problem: Discharge Planning  Goal: Discharge to home or other facility with appropriate resources  Outcome: Progressing     Problem: Pain  Goal: Verbalizes/displays adequate comfort level or baseline comfort level  8/11/2024 1758 by Clari Shane, RN  Outcome: Progressing  8/11/2024 0445 by Kaitlynn Rivera, RN  Outcome: Progressing     Problem: Respiratory - Adult  Goal: Achieves optimal ventilation and oxygenation  Outcome: Progressing     Problem: Skin/Tissue Integrity  Goal: Absence of new skin breakdown  Description: 1.  Monitor for areas of redness and/or skin breakdown  2.  Assess vascular access sites hourly  3.  Every 4-6 hours minimum:  Change oxygen saturation probe site  4.  Every 4-6 hours:  If on nasal continuous positive airway pressure, respiratory therapy assess nares and determine need for appliance change or resting period.  Outcome: Progressing     Problem: Nutrition Deficit:  Goal: Optimize nutritional status  Outcome: Progressing     Problem: Chronic Conditions and Co-morbidities  Goal: Patient's chronic conditions and co-morbidity symptoms are monitored and maintained or improved  Outcome: Progressing     Problem: ABCDS Injury Assessment  Goal: Absence of physical injury  Outcome: Progressing     
  Problem: Safety - Adult  Goal: Free from fall injury  Outcome: Progressing     Problem: Discharge Planning  Goal: Discharge to home or other facility with appropriate resources  Outcome: Progressing     Problem: Pain  Goal: Verbalizes/displays adequate comfort level or baseline comfort level  Outcome: Progressing     Problem: Respiratory - Adult  Goal: Achieves optimal ventilation and oxygenation  Outcome: Progressing     
  Problem: Safety - Adult  Goal: Free from fall injury  Outcome: Progressing     Problem: Discharge Planning  Goal: Discharge to home or other facility with appropriate resources  Outcome: Progressing     Problem: Pain  Goal: Verbalizes/displays adequate comfort level or baseline comfort level  Outcome: Progressing     Problem: Respiratory - Adult  Goal: Achieves optimal ventilation and oxygenation  Outcome: Progressing     Problem: Skin/Tissue Integrity  Goal: Absence of new skin breakdown  Description: 1.  Monitor for areas of redness and/or skin breakdown  2.  Assess vascular access sites hourly  3.  Every 4-6 hours minimum:  Change oxygen saturation probe site  4.  Every 4-6 hours:  If on nasal continuous positive airway pressure, respiratory therapy assess nares and determine need for appliance change or resting period.  Outcome: Progressing     Problem: Safety - Medical Restraint  Goal: Remains free of injury from restraints (Restraint for Interference with Medical Device)  Description: INTERVENTIONS:  1. Determine that other, less restrictive measures have been tried or would not be effective before applying the restraint  2. Evaluate the patient's condition at the time of restraint application  3. Inform patient/family regarding the reason for restraint  4. Q2H: Monitor safety, psychosocial status, comfort, nutrition and hydration  Outcome: Progressing     Problem: Nutrition Deficit:  Goal: Optimize nutritional status  Outcome: Progressing     Problem: Chronic Conditions and Co-morbidities  Goal: Patient's chronic conditions and co-morbidity symptoms are monitored and maintained or improved  Outcome: Progressing     Problem: ABCDS Injury Assessment  Goal: Absence of physical injury  Outcome: Progressing     
  Problem: Safety - Adult  Goal: Free from fall injury  Outcome: Progressing     Problem: Discharge Planning  Goal: Discharge to home or other facility with appropriate resources  Outcome: Progressing     Problem: Pain  Goal: Verbalizes/displays adequate comfort level or baseline comfort level  Outcome: Progressing  Flowsheets  Taken 8/6/2024 0400  Verbalizes/displays adequate comfort level or baseline comfort level: Assess pain using appropriate pain scale  Taken 8/5/2024 2000  Verbalizes/displays adequate comfort level or baseline comfort level: Assess pain using appropriate pain scale     Problem: Respiratory - Adult  Goal: Achieves optimal ventilation and oxygenation  Outcome: Progressing     Problem: Skin/Tissue Integrity  Goal: Absence of new skin breakdown  Description: 1.  Monitor for areas of redness and/or skin breakdown  2.  Assess vascular access sites hourly  3.  Every 4-6 hours minimum:  Change oxygen saturation probe site  4.  Every 4-6 hours:  If on nasal continuous positive airway pressure, respiratory therapy assess nares and determine need for appliance change or resting period.  Outcome: Progressing     Problem: Nutrition Deficit:  Goal: Optimize nutritional status  Outcome: Progressing     Problem: Chronic Conditions and Co-morbidities  Goal: Patient's chronic conditions and co-morbidity symptoms are monitored and maintained or improved  Outcome: Progressing     
  Problem: Safety - Adult  Goal: Free from fall injury  Outcome: Progressing     Problem: Discharge Planning  Goal: Discharge to home or other facility with appropriate resources  Outcome: Progressing     Problem: Pain  Goal: Verbalizes/displays adequate comfort level or baseline comfort level  Outcome: Progressing  Flowsheets (Taken 8/4/2024 0800)  Verbalizes/displays adequate comfort level or baseline comfort level: Assess pain using appropriate pain scale     Problem: Respiratory - Adult  Goal: Achieves optimal ventilation and oxygenation  Outcome: Progressing     Problem: Skin/Tissue Integrity  Goal: Absence of new skin breakdown  Description: 1.  Monitor for areas of redness and/or skin breakdown  2.  Assess vascular access sites hourly  3.  Every 4-6 hours minimum:  Change oxygen saturation probe site  4.  Every 4-6 hours:  If on nasal continuous positive airway pressure, respiratory therapy assess nares and determine need for appliance change or resting period.  Outcome: Progressing     Problem: Safety - Medical Restraint  Goal: Remains free of injury from restraints (Restraint for Interference with Medical Device)  Description: INTERVENTIONS:  1. Determine that other, less restrictive measures have been tried or would not be effective before applying the restraint  2. Evaluate the patient's condition at the time of restraint application  3. Inform patient/family regarding the reason for restraint  4. Q2H: Monitor safety, psychosocial status, comfort, nutrition and hydration  Outcome: Progressing  Flowsheets  Taken 8/4/2024 1400  Remains free of injury from restraints (restraint for interference with medical device): Every 2 hours: Monitor safety, psychosocial status, comfort, nutrition and hydration  Taken 8/4/2024 1200  Remains free of injury from restraints (restraint for interference with medical device): Every 2 hours: Monitor safety, psychosocial status, comfort, nutrition and hydration  Taken 8/4/2024 
  Problem: Safety - Adult  Goal: Free from fall injury  Outcome: Progressing     Problem: Discharge Planning  Goal: Discharge to home or other facility with appropriate resources  Outcome: Progressing     Problem: Pain  Goal: Verbalizes/displays adequate comfort level or baseline comfort level  Outcome: Progressing  Flowsheets (Taken 8/6/2024 2000)  Verbalizes/displays adequate comfort level or baseline comfort level: Assess pain using appropriate pain scale     Problem: Respiratory - Adult  Goal: Achieves optimal ventilation and oxygenation  Outcome: Progressing     Problem: Skin/Tissue Integrity  Goal: Absence of new skin breakdown  Description: 1.  Monitor for areas of redness and/or skin breakdown  2.  Assess vascular access sites hourly  3.  Every 4-6 hours minimum:  Change oxygen saturation probe site  4.  Every 4-6 hours:  If on nasal continuous positive airway pressure, respiratory therapy assess nares and determine need for appliance change or resting period.  Outcome: Progressing     Problem: Safety - Medical Restraint  Goal: Remains free of injury from restraints (Restraint for Interference with Medical Device)  Description: INTERVENTIONS:  1. Determine that other, less restrictive measures have been tried or would not be effective before applying the restraint  2. Evaluate the patient's condition at the time of restraint application  3. Inform patient/family regarding the reason for restraint  4. Q2H: Monitor safety, psychosocial status, comfort, nutrition and hydration  Outcome: Progressing     Problem: Nutrition Deficit:  Goal: Optimize nutritional status  Outcome: Progressing     Problem: Chronic Conditions and Co-morbidities  Goal: Patient's chronic conditions and co-morbidity symptoms are monitored and maintained or improved  Outcome: Progressing     Problem: ABCDS Injury Assessment  Goal: Absence of physical injury  Outcome: Progressing     
  Problem: Safety - Adult  Goal: Free from fall injury  Outcome: Progressing  Flowsheets (Taken 8/3/2024 1000)  Free From Fall Injury: Based on caregiver fall risk screen, instruct family/caregiver to ask for assistance with transferring infant if caregiver noted to have fall risk factors     Problem: Discharge Planning  Goal: Discharge to home or other facility with appropriate resources  Outcome: Progressing  Flowsheets (Taken 8/3/2024 0940)  Discharge to home or other facility with appropriate resources: Refer to discharge planning if patient needs post-hospital services based on physician order or complex needs related to functional status, cognitive ability or social support system     Problem: Pain  Goal: Verbalizes/displays adequate comfort level or baseline comfort level  Outcome: Progressing     
and recommend course of action   Recommend, monitor, and adjust tube feedings and TPN/PPN based on assessed needs   Monitor oral intake, labs, and treatment plans   Recommend appropriate diets, oral nutritional supplements, and vitamin/mineral supplements  8/8/2024 0653 by Dickson Romano RN  Outcome: Progressing     Problem: Chronic Conditions and Co-morbidities  Goal: Patient's chronic conditions and co-morbidity symptoms are monitored and maintained or improved  8/8/2024 1810 by Gabriel Otoole RN  Outcome: Progressing  8/8/2024 0653 by Dickson Romano RN  Outcome: Progressing     Problem: ABCDS Injury Assessment  Goal: Absence of physical injury  8/8/2024 1810 by Gabriel Otoole RN  Outcome: Progressing  8/8/2024 0653 by Dickson Romano RN  Outcome: Progressing     
hours: Monitor safety, psychosocial status, comfort, nutrition and hydration   Evaluate the patient's condition at the time of restraint application  8/5/2024 0127 by Kiley Snyder RN  Outcome: Not Progressing  8/5/2024 0126 by Kiley Snyder, MARCO ANTONIO  Outcome: Progressing  Flowsheets  Taken 8/5/2024 0000  Remains free of injury from restraints (restraint for interference with medical device):   Every 2 hours: Monitor safety, psychosocial status, comfort, nutrition and hydration   Evaluate the patient's condition at the time of restraint application  Taken 8/4/2024 2200  Remains free of injury from restraints (restraint for interference with medical device):   Every 2 hours: Monitor safety, psychosocial status, comfort, nutrition and hydration   Evaluate the patient's condition at the time of restraint application  Taken 8/4/2024 2000  Remains free of injury from restraints (restraint for interference with medical device):   Every 2 hours: Monitor safety, psychosocial status, comfort, nutrition and hydration   Evaluate the patient's condition at the time of restraint application

## 2024-08-15 ENCOUNTER — HOSPITAL ENCOUNTER (OUTPATIENT)
Age: 84
Setting detail: SPECIMEN
Discharge: HOME | End: 2024-08-15

## 2024-08-15 ENCOUNTER — TELEPHONE (OUTPATIENT)
Dept: FAMILY MEDICINE CLINIC | Age: 84
End: 2024-08-15

## 2024-08-15 LAB
ALBUMIN SERPL-MCNC: 3.5 G/DL (ref 3.5–5.2)
ALP SERPL-CCNC: 134 U/L (ref 40–129)
ALT SERPL-CCNC: 13 U/L (ref 5–41)
ANION GAP SERPL CALCULATED.3IONS-SCNC: 13 MMOL/L
AST SERPL-CCNC: 40 U/L
BILIRUB SERPL-MCNC: 0.6 MG/DL (ref 0.3–1.2)
BUN SERPL-MCNC: 30 MG/DL (ref 8–23)
BUN/CREAT SERPL: 23 (ref 9–20)
CALCIUM SERPL-MCNC: 9.7 MG/DL (ref 8.6–10.4)
CHLORIDE SERPL-SCNC: 96 MMOL/L (ref 98–107)
CO2 SERPL-SCNC: 24 MMOL/L (ref 20–31)
CREAT SERPL-MCNC: 1.3 MG/DL (ref 0.7–1.2)
ERYTHROCYTE [DISTWIDTH] IN BLOOD BY AUTOMATED COUNT: 13.3 % (ref 11.8–14.4)
GFR, ESTIMATED: 54 ML/MIN/1.73M2
GLUCOSE SERPL-MCNC: 183 MG/DL (ref 70–99)
HCT VFR BLD AUTO: 49.7 % (ref 40.7–50.3)
HGB BLD-MCNC: 15.9 G/DL (ref 13–17)
MCH RBC QN AUTO: 32.1 PG (ref 25.2–33.5)
MCHC RBC AUTO-ENTMCNC: 32 G/DL (ref 28.4–34.8)
MCV RBC AUTO: 100.4 FL (ref 82.6–102.9)
NRBC BLD-RTO: 0 PER 100 WBC
PLATELET # BLD AUTO: 249 K/UL (ref 138–453)
PMV BLD AUTO: 9.2 FL (ref 8.1–13.5)
POTASSIUM SERPL-SCNC: 4.4 MMOL/L (ref 3.7–5.3)
PROT SERPL-MCNC: 7.6 G/DL (ref 6.4–8.3)
RBC # BLD AUTO: 4.95 M/UL (ref 4.21–5.77)
SODIUM SERPL-SCNC: 133 MMOL/L (ref 135–144)
WBC OTHER # BLD: 7.6 K/UL (ref 3.5–11.3)

## 2024-08-15 PROCEDURE — P9603 ONE-WAY ALLOW PRORATED MILES: HCPCS

## 2024-08-15 PROCEDURE — 36415 COLL VENOUS BLD VENIPUNCTURE: CPT

## 2024-08-15 PROCEDURE — 80053 COMPREHEN METABOLIC PANEL: CPT

## 2024-08-15 PROCEDURE — 85027 COMPLETE CBC AUTOMATED: CPT

## 2024-08-15 NOTE — TELEPHONE ENCOUNTER
[facility] Transitions Initial Follow Up Call    Outreach made within 2 business days of discharge: Yes    Patient: Mina Gill   Patient : 1940   MRN: [id number]    Reason for Admission: 24-24 complete heart block [facility]   Discharge Date: 24       Spoke with: lvm     Discharge department/facility: [facility]         Scheduled appointment with PCP within 7-14 days    Follow Up  Future Appointments   Date Time Provider Department Center   2024 10:30 AM SCHEDULE, KEYA DAVIDSON RN [facility]   10/29/2024  1:00 PM Abraham Cash MD Neuro [facility] Neurology -       Lucero eKlly LPN

## 2024-08-16 ENCOUNTER — HOSPITAL ENCOUNTER (OUTPATIENT)
Age: 84
Setting detail: SPECIMEN
Discharge: HOME | End: 2024-08-16

## 2024-08-16 ENCOUNTER — TELEPHONE (OUTPATIENT)
Dept: FAMILY MEDICINE CLINIC | Age: 84
End: 2024-08-16

## 2024-08-16 LAB
ANION GAP SERPL CALCULATED.3IONS-SCNC: 12 MMOL/L (ref 9–17)
BUN SERPL-MCNC: 27 MG/DL (ref 8–23)
BUN/CREAT SERPL: 19 (ref 9–20)
CALCIUM SERPL-MCNC: 9.9 MG/DL (ref 8.6–10.4)
CHLORIDE SERPL-SCNC: 96 MMOL/L (ref 98–107)
CO2 SERPL-SCNC: 25 MMOL/L (ref 20–31)
CREAT SERPL-MCNC: 1.4 MG/DL (ref 0.7–1.2)
ERYTHROCYTE [DISTWIDTH] IN BLOOD BY AUTOMATED COUNT: 13.2 % (ref 11.8–14.4)
GFR, ESTIMATED: 49 ML/MIN/1.73M2
GLUCOSE SERPL-MCNC: 124 MG/DL (ref 70–99)
HCT VFR BLD AUTO: 46.8 % (ref 40.7–50.3)
HGB BLD-MCNC: 15.6 G/DL (ref 13–17)
MCH RBC QN AUTO: 32.7 PG (ref 25.2–33.5)
MCHC RBC AUTO-ENTMCNC: 33.3 G/DL (ref 28.4–34.8)
MCV RBC AUTO: 98.1 FL (ref 82.6–102.9)
NRBC BLD-RTO: 0 PER 100 WBC
PLATELET # BLD AUTO: 238 K/UL (ref 138–453)
PMV BLD AUTO: 9.8 FL (ref 8.1–13.5)
POTASSIUM SERPL-SCNC: 4.8 MMOL/L (ref 3.7–5.3)
RBC # BLD AUTO: 4.77 M/UL (ref 4.21–5.77)
SODIUM SERPL-SCNC: 133 MMOL/L (ref 135–144)
WBC OTHER # BLD: 7.4 K/UL (ref 3.5–11.3)

## 2024-08-16 PROCEDURE — P9603 ONE-WAY ALLOW PRORATED MILES: HCPCS

## 2024-08-16 PROCEDURE — 80048 BASIC METABOLIC PNL TOTAL CA: CPT

## 2024-08-16 PROCEDURE — 36415 COLL VENOUS BLD VENIPUNCTURE: CPT

## 2024-08-16 PROCEDURE — 85027 COMPLETE CBC AUTOMATED: CPT

## 2024-08-16 NOTE — TELEPHONE ENCOUNTER
[facility] Transitions Initial Follow Up Call    Outreach made within 2 business days of discharge: Yes    Patient: Mina Gill   Patient : 1940   MRN: [id number]    Reason for Admission: [place] 24-24 complete heart block   Discharge Date: 24       Spoke with: mailbox full    Discharge department/facility: [facility]         Scheduled appointment with PCP within 7-14 days    Follow Up  Future Appointments   Date Time Provider Department Center   2024 10:30 AM SCHEDULE, [facility]   10/29/2024  1:00 PM Abraham Cash MD Neuro [facility] Neurology -       Lucero Kelly LPN

## 2024-08-18 ENCOUNTER — HOSPITAL ENCOUNTER (OUTPATIENT)
Age: 84
Setting detail: SPECIMEN
Discharge: HOME | End: 2024-08-18

## 2024-08-18 LAB
TROPONIN I SERPL HS-MCNC: 28 NG/L (ref 0–22)
TROPONIN I SERPL HS-MCNC: 33 NG/L (ref 0–22)

## 2024-08-18 PROCEDURE — 84484 ASSAY OF TROPONIN QUANT: CPT

## 2024-08-19 LAB — ECHO BSA: 2.07 M2

## 2024-08-20 ENCOUNTER — HOSPITAL ENCOUNTER (OUTPATIENT)
Age: 84
Setting detail: SPECIMEN
Discharge: HOME | End: 2024-08-20

## 2024-08-20 LAB
ALBUMIN SERPL-MCNC: 3.5 G/DL (ref 3.5–5.2)
ALP SERPL-CCNC: 125 U/L (ref 40–129)
ALT SERPL-CCNC: 9 U/L (ref 5–41)
ANION GAP SERPL CALCULATED.3IONS-SCNC: 11 MMOL/L (ref 9–17)
AST SERPL-CCNC: 39 U/L
BILIRUB SERPL-MCNC: 0.5 MG/DL (ref 0.3–1.2)
BUN SERPL-MCNC: 28 MG/DL (ref 8–23)
BUN/CREAT SERPL: 20 (ref 9–20)
CALCIUM SERPL-MCNC: 9.4 MG/DL (ref 8.6–10.4)
CHLORIDE SERPL-SCNC: 98 MMOL/L (ref 98–107)
CO2 SERPL-SCNC: 25 MMOL/L (ref 20–31)
CREAT SERPL-MCNC: 1.4 MG/DL (ref 0.7–1.2)
ERYTHROCYTE [DISTWIDTH] IN BLOOD BY AUTOMATED COUNT: 12.9 % (ref 11.8–14.4)
GFR, ESTIMATED: 49 ML/MIN/1.73M2
GLUCOSE SERPL-MCNC: 116 MG/DL (ref 70–99)
HCT VFR BLD AUTO: 40.7 % (ref 40.7–50.3)
HGB BLD-MCNC: 13.8 G/DL (ref 13–17)
MCH RBC QN AUTO: 32.3 PG (ref 25.2–33.5)
MCHC RBC AUTO-ENTMCNC: 33.9 G/DL (ref 28.4–34.8)
MCV RBC AUTO: 95.3 FL (ref 82.6–102.9)
NRBC BLD-RTO: 0 PER 100 WBC
PLATELET # BLD AUTO: 173 K/UL (ref 138–453)
PMV BLD AUTO: 9.3 FL (ref 8.1–13.5)
POTASSIUM SERPL-SCNC: 4.2 MMOL/L (ref 3.7–5.3)
PROT SERPL-MCNC: 6.7 G/DL (ref 6.4–8.3)
RBC # BLD AUTO: 4.27 M/UL (ref 4.21–5.77)
SODIUM SERPL-SCNC: 134 MMOL/L (ref 135–144)
WBC OTHER # BLD: 5 K/UL (ref 3.5–11.3)

## 2024-08-20 PROCEDURE — 36415 COLL VENOUS BLD VENIPUNCTURE: CPT

## 2024-08-20 PROCEDURE — P9603 ONE-WAY ALLOW PRORATED MILES: HCPCS

## 2024-08-20 PROCEDURE — 85027 COMPLETE CBC AUTOMATED: CPT

## 2024-08-20 PROCEDURE — 80053 COMPREHEN METABOLIC PANEL: CPT

## 2024-08-23 ENCOUNTER — HOSPITAL ENCOUNTER (OUTPATIENT)
Age: 84
Setting detail: SPECIMEN
Discharge: HOME | End: 2024-08-23

## 2024-08-23 LAB
ALBUMIN SERPL-MCNC: 3.6 G/DL (ref 3.5–5.2)
ALP SERPL-CCNC: 134 U/L (ref 40–129)
ALT SERPL-CCNC: 20 U/L (ref 5–41)
ANION GAP SERPL CALCULATED.3IONS-SCNC: 15 MMOL/L (ref 9–17)
AST SERPL-CCNC: 32 U/L
BILIRUB SERPL-MCNC: 0.6 MG/DL (ref 0.3–1.2)
BUN SERPL-MCNC: 31 MG/DL (ref 8–23)
BUN/CREAT SERPL: 22 (ref 9–20)
CALCIUM SERPL-MCNC: 9.6 MG/DL (ref 8.6–10.4)
CHLORIDE SERPL-SCNC: 96 MMOL/L (ref 98–107)
CO2 SERPL-SCNC: 22 MMOL/L (ref 20–31)
CREAT SERPL-MCNC: 1.4 MG/DL (ref 0.7–1.2)
ERYTHROCYTE [DISTWIDTH] IN BLOOD BY AUTOMATED COUNT: 12.8 % (ref 11.8–14.4)
GFR, ESTIMATED: 49 ML/MIN/1.73M2
GLUCOSE SERPL-MCNC: 117 MG/DL (ref 70–99)
HCT VFR BLD AUTO: 43.5 % (ref 40.7–50.3)
HGB BLD-MCNC: 14.7 G/DL (ref 13–17)
MCH RBC QN AUTO: 32.9 PG (ref 25.2–33.5)
MCHC RBC AUTO-ENTMCNC: 33.8 G/DL (ref 28.4–34.8)
MCV RBC AUTO: 97.3 FL (ref 82.6–102.9)
NRBC BLD-RTO: 0 PER 100 WBC
PLATELET # BLD AUTO: 149 K/UL (ref 138–453)
PMV BLD AUTO: 9.4 FL (ref 8.1–13.5)
POTASSIUM SERPL-SCNC: 4.4 MMOL/L (ref 3.7–5.3)
PROT SERPL-MCNC: 7.2 G/DL (ref 6.4–8.3)
RBC # BLD AUTO: 4.47 M/UL (ref 4.21–5.77)
SODIUM SERPL-SCNC: 133 MMOL/L (ref 135–144)
WBC OTHER # BLD: 6.4 K/UL (ref 3.5–11.3)

## 2024-08-23 PROCEDURE — P9603 ONE-WAY ALLOW PRORATED MILES: HCPCS

## 2024-08-23 PROCEDURE — 85027 COMPLETE CBC AUTOMATED: CPT

## 2024-08-23 PROCEDURE — 36415 COLL VENOUS BLD VENIPUNCTURE: CPT

## 2024-08-23 PROCEDURE — 80053 COMPREHEN METABOLIC PANEL: CPT

## 2024-08-27 ENCOUNTER — HOSPITAL ENCOUNTER (OUTPATIENT)
Age: 84
Setting detail: SPECIMEN
Discharge: HOME | End: 2024-08-27

## 2024-08-27 LAB
ALBUMIN SERPL-MCNC: 3.8 G/DL (ref 3.5–5.2)
ALP SERPL-CCNC: 129 U/L (ref 40–129)
ALT SERPL-CCNC: 11 U/L (ref 5–41)
ANION GAP SERPL CALCULATED.3IONS-SCNC: 13 MMOL/L (ref 9–17)
AST SERPL-CCNC: 31 U/L
BILIRUB SERPL-MCNC: 0.6 MG/DL (ref 0.3–1.2)
BUN SERPL-MCNC: 33 MG/DL (ref 8–23)
BUN/CREAT SERPL: 21 (ref 9–20)
CALCIUM SERPL-MCNC: 10 MG/DL (ref 8.6–10.4)
CHLORIDE SERPL-SCNC: 95 MMOL/L (ref 98–107)
CO2 SERPL-SCNC: 26 MMOL/L (ref 20–31)
CREAT SERPL-MCNC: 1.6 MG/DL (ref 0.7–1.2)
ERYTHROCYTE [DISTWIDTH] IN BLOOD BY AUTOMATED COUNT: 12.7 % (ref 11.8–14.4)
GFR, ESTIMATED: 42 ML/MIN/1.73M2
GLUCOSE SERPL-MCNC: 121 MG/DL (ref 70–99)
HCT VFR BLD AUTO: 42.2 % (ref 40.7–50.3)
HGB BLD-MCNC: 14.5 G/DL (ref 13–17)
MCH RBC QN AUTO: 32.9 PG (ref 25.2–33.5)
MCHC RBC AUTO-ENTMCNC: 34.4 G/DL (ref 28.4–34.8)
MCV RBC AUTO: 95.7 FL (ref 82.6–102.9)
NRBC BLD-RTO: 0 PER 100 WBC
PLATELET # BLD AUTO: 156 K/UL (ref 138–453)
PMV BLD AUTO: 9 FL (ref 8.1–13.5)
POTASSIUM SERPL-SCNC: 4.5 MMOL/L (ref 3.7–5.3)
PROT SERPL-MCNC: 7.1 G/DL (ref 6.4–8.3)
RBC # BLD AUTO: 4.41 M/UL (ref 4.21–5.77)
SODIUM SERPL-SCNC: 134 MMOL/L (ref 135–144)
WBC OTHER # BLD: 6.2 K/UL (ref 3.5–11.3)

## 2024-08-27 PROCEDURE — 85027 COMPLETE CBC AUTOMATED: CPT

## 2024-08-27 PROCEDURE — 36415 COLL VENOUS BLD VENIPUNCTURE: CPT

## 2024-08-27 PROCEDURE — 80053 COMPREHEN METABOLIC PANEL: CPT

## 2024-08-27 PROCEDURE — P9603 ONE-WAY ALLOW PRORATED MILES: HCPCS

## 2024-09-09 ENCOUNTER — OFFICE VISIT (OUTPATIENT)
Dept: FAMILY MEDICINE CLINIC | Age: 84
End: 2024-09-09
Payer: MEDICARE

## 2024-09-09 VITALS
OXYGEN SATURATION: 98 % | WEIGHT: 171 LBS | HEART RATE: 82 BPM | DIASTOLIC BLOOD PRESSURE: 70 MMHG | TEMPERATURE: 97.1 F | SYSTOLIC BLOOD PRESSURE: 103 MMHG | BODY MASS INDEX: 26.84 KG/M2 | HEIGHT: 67 IN

## 2024-09-09 DIAGNOSIS — Z00.00 ENCOUNTER FOR ANNUAL WELLNESS VISIT (AWV) IN MEDICARE PATIENT: ICD-10-CM

## 2024-09-09 DIAGNOSIS — Z86.73 HISTORY OF CVA (CEREBROVASCULAR ACCIDENT): ICD-10-CM

## 2024-09-09 DIAGNOSIS — I25.119 CORONARY ARTERY DISEASE INVOLVING NATIVE HEART WITH ANGINA PECTORIS, UNSPECIFIED VESSEL OR LESION TYPE (HCC): ICD-10-CM

## 2024-09-09 DIAGNOSIS — E11.22 TYPE 2 DIABETES MELLITUS WITH DIABETIC CHRONIC KIDNEY DISEASE, UNSPECIFIED CKD STAGE, UNSPECIFIED WHETHER LONG TERM INSULIN USE (HCC): ICD-10-CM

## 2024-09-09 DIAGNOSIS — G20.A1 PARKINSON'S DISEASE, UNSPECIFIED WHETHER DYSKINESIA PRESENT, UNSPECIFIED WHETHER MANIFESTATIONS FLUCTUATE (HCC): ICD-10-CM

## 2024-09-09 DIAGNOSIS — I44.2 HEART BLOCK AV COMPLETE (HCC): Primary | ICD-10-CM

## 2024-09-09 DIAGNOSIS — D12.6 TUBULAR ADENOMA OF COLON: ICD-10-CM

## 2024-09-09 DIAGNOSIS — M17.12 PRIMARY OSTEOARTHRITIS OF LEFT KNEE: ICD-10-CM

## 2024-09-09 LAB — HBA1C MFR BLD: 6.5 %

## 2024-09-09 PROCEDURE — 99211 OFF/OP EST MAY X REQ PHY/QHP: CPT | Performed by: STUDENT IN AN ORGANIZED HEALTH CARE EDUCATION/TRAINING PROGRAM

## 2024-09-09 PROCEDURE — 83036 HEMOGLOBIN GLYCOSYLATED A1C: CPT

## 2024-09-09 RX ORDER — ASPIRIN 81 MG
1 TABLET,CHEWABLE ORAL EVERY 8 HOURS PRN
Qty: 40 G | Refills: 1 | Status: SHIPPED | OUTPATIENT
Start: 2024-09-09 | End: 2024-09-23

## 2024-09-09 SDOH — ECONOMIC STABILITY: INCOME INSECURITY: HOW HARD IS IT FOR YOU TO PAY FOR THE VERY BASICS LIKE FOOD, HOUSING, MEDICAL CARE, AND HEATING?: VERY HARD

## 2024-09-09 SDOH — ECONOMIC STABILITY: FOOD INSECURITY: WITHIN THE PAST 12 MONTHS, YOU WORRIED THAT YOUR FOOD WOULD RUN OUT BEFORE YOU GOT MONEY TO BUY MORE.: NEVER TRUE

## 2024-09-09 SDOH — ECONOMIC STABILITY: FOOD INSECURITY: WITHIN THE PAST 12 MONTHS, THE FOOD YOU BOUGHT JUST DIDN'T LAST AND YOU DIDN'T HAVE MONEY TO GET MORE.: NEVER TRUE

## 2024-09-09 ASSESSMENT — PATIENT HEALTH QUESTIONNAIRE - PHQ9
SUM OF ALL RESPONSES TO PHQ QUESTIONS 1-9: 0
2. FEELING DOWN, DEPRESSED OR HOPELESS: NOT AT ALL
SUM OF ALL RESPONSES TO PHQ QUESTIONS 1-9: 0
1. LITTLE INTEREST OR PLEASURE IN DOING THINGS: NOT AT ALL
SUM OF ALL RESPONSES TO PHQ9 QUESTIONS 1 & 2: 0
SUM OF ALL RESPONSES TO PHQ QUESTIONS 1-9: 0
1. LITTLE INTEREST OR PLEASURE IN DOING THINGS: SEVERAL DAYS
2. FEELING DOWN, DEPRESSED OR HOPELESS: NOT AT ALL
1. LITTLE INTEREST OR PLEASURE IN DOING THINGS: NOT AT ALL
SUM OF ALL RESPONSES TO PHQ9 QUESTIONS 1 & 2: 1
6. FEELING BAD ABOUT YOURSELF - OR THAT YOU ARE A FAILURE OR HAVE LET YOURSELF OR YOUR FAMILY DOWN: NOT AT ALL
SUM OF ALL RESPONSES TO PHQ9 QUESTIONS 1 & 2: 0
SUM OF ALL RESPONSES TO PHQ QUESTIONS 1-9: 1
4. FEELING TIRED OR HAVING LITTLE ENERGY: NOT AT ALL
SUM OF ALL RESPONSES TO PHQ QUESTIONS 1-9: 1
SUM OF ALL RESPONSES TO PHQ QUESTIONS 1-9: 0
SUM OF ALL RESPONSES TO PHQ QUESTIONS 1-9: 0
1. LITTLE INTEREST OR PLEASURE IN DOING THINGS: NOT AT ALL
7. TROUBLE CONCENTRATING ON THINGS, SUCH AS READING THE NEWSPAPER OR WATCHING TELEVISION: NOT AT ALL
SUM OF ALL RESPONSES TO PHQ9 QUESTIONS 1 & 2: 0
SUM OF ALL RESPONSES TO PHQ QUESTIONS 1-9: 1
9. THOUGHTS THAT YOU WOULD BE BETTER OFF DEAD, OR OF HURTING YOURSELF: NOT AT ALL
2. FEELING DOWN, DEPRESSED OR HOPELESS: NOT AT ALL
8. MOVING OR SPEAKING SO SLOWLY THAT OTHER PEOPLE COULD HAVE NOTICED. OR THE OPPOSITE, BEING SO FIGETY OR RESTLESS THAT YOU HAVE BEEN MOVING AROUND A LOT MORE THAN USUAL: NOT AT ALL
10. IF YOU CHECKED OFF ANY PROBLEMS, HOW DIFFICULT HAVE THESE PROBLEMS MADE IT FOR YOU TO DO YOUR WORK, TAKE CARE OF THINGS AT HOME, OR GET ALONG WITH OTHER PEOPLE: NOT DIFFICULT AT ALL
SUM OF ALL RESPONSES TO PHQ QUESTIONS 1-9: 1
SUM OF ALL RESPONSES TO PHQ QUESTIONS 1-9: 0
3. TROUBLE FALLING OR STAYING ASLEEP: NOT AT ALL
SUM OF ALL RESPONSES TO PHQ QUESTIONS 1-9: 0
5. POOR APPETITE OR OVEREATING: NOT AT ALL
SUM OF ALL RESPONSES TO PHQ QUESTIONS 1-9: 0
SUM OF ALL RESPONSES TO PHQ QUESTIONS 1-9: 0
2. FEELING DOWN, DEPRESSED OR HOPELESS: NOT AT ALL
SUM OF ALL RESPONSES TO PHQ QUESTIONS 1-9: 0

## 2024-09-09 ASSESSMENT — ENCOUNTER SYMPTOMS
COUGH: 0
SHORTNESS OF BREATH: 0

## 2024-09-10 ENCOUNTER — CLINICAL DOCUMENTATION (OUTPATIENT)
Dept: SPIRITUAL SERVICES | Age: 84
End: 2024-09-10

## 2024-09-10 ENCOUNTER — TELEPHONE (OUTPATIENT)
Dept: GASTROENTEROLOGY | Age: 84
End: 2024-09-10

## 2024-09-10 RX ORDER — TAMSULOSIN HYDROCHLORIDE 0.4 MG/1
0.4 CAPSULE ORAL DAILY
Qty: 90 CAPSULE | Refills: 3 | Status: SHIPPED | OUTPATIENT
Start: 2024-09-10

## 2024-09-10 RX ORDER — ALLOPURINOL 100 MG/1
TABLET ORAL
Qty: 90 TABLET | Refills: 0 | Status: SHIPPED | OUTPATIENT
Start: 2024-09-10

## 2024-09-11 DIAGNOSIS — G20.A1 PARKINSON'S DISEASE, UNSPECIFIED WHETHER DYSKINESIA PRESENT, UNSPECIFIED WHETHER MANIFESTATIONS FLUCTUATE (HCC): ICD-10-CM

## 2024-09-11 RX ORDER — GABAPENTIN 100 MG/1
100 CAPSULE ORAL DAILY
Qty: 30 CAPSULE | Refills: 1 | Status: SHIPPED | OUTPATIENT
Start: 2024-09-11 | End: 2024-11-10

## 2024-09-11 RX ORDER — ROPINIROLE 0.5 MG/1
TABLET, FILM COATED ORAL
Qty: 90 TABLET | Refills: 4 | Status: SHIPPED | OUTPATIENT
Start: 2024-09-11

## 2024-09-11 RX ORDER — SUCRALFATE 1 G/1
TABLET ORAL
Qty: 120 TABLET | Refills: 4 | Status: SHIPPED | OUTPATIENT
Start: 2024-09-11

## 2024-09-12 RX ORDER — AMLODIPINE BESYLATE 5 MG/1
5 TABLET ORAL DAILY
Qty: 90 TABLET | Refills: 5 | Status: SHIPPED | OUTPATIENT
Start: 2024-09-12

## 2024-09-12 RX ORDER — FUROSEMIDE 40 MG
TABLET ORAL
Qty: 30 TABLET | Refills: 0 | Status: SHIPPED | OUTPATIENT
Start: 2024-09-12

## 2024-09-12 RX ORDER — CARBIDOPA AND LEVODOPA 25; 100 MG/1; MG/1
TABLET ORAL
Qty: 360 TABLET | Refills: 5 | Status: SHIPPED | OUTPATIENT
Start: 2024-09-12

## 2024-09-17 ENCOUNTER — CLINICAL DOCUMENTATION (OUTPATIENT)
Dept: SPIRITUAL SERVICES | Age: 84
End: 2024-09-17

## 2024-09-19 ENCOUNTER — OFFICE VISIT (OUTPATIENT)
Dept: INFECTIOUS DISEASES | Age: 84
End: 2024-09-19
Payer: MEDICARE

## 2024-09-19 VITALS
BODY MASS INDEX: 27.4 KG/M2 | HEIGHT: 67 IN | WEIGHT: 174.6 LBS | HEART RATE: 80 BPM | DIASTOLIC BLOOD PRESSURE: 68 MMHG | TEMPERATURE: 97.1 F | SYSTOLIC BLOOD PRESSURE: 110 MMHG

## 2024-09-19 DIAGNOSIS — I44.2 COMPLETE HEART BLOCK (HCC): Primary | ICD-10-CM

## 2024-09-19 DIAGNOSIS — G20.A1 PARKINSON'S DISEASE WITHOUT DYSKINESIA, UNSPECIFIED WHETHER MANIFESTATIONS FLUCTUATE (HCC): ICD-10-CM

## 2024-09-19 DIAGNOSIS — A41.9 SEPTICEMIA (HCC): ICD-10-CM

## 2024-09-19 PROCEDURE — 3074F SYST BP LT 130 MM HG: CPT | Performed by: INTERNAL MEDICINE

## 2024-09-19 PROCEDURE — 99213 OFFICE O/P EST LOW 20 MIN: CPT | Performed by: INTERNAL MEDICINE

## 2024-09-19 PROCEDURE — 3078F DIAST BP <80 MM HG: CPT | Performed by: INTERNAL MEDICINE

## 2024-09-19 PROCEDURE — 1123F ACP DISCUSS/DSCN MKR DOCD: CPT | Performed by: INTERNAL MEDICINE

## 2024-09-25 ENCOUNTER — OFFICE VISIT (OUTPATIENT)
Dept: ORTHOPEDIC SURGERY | Age: 84
End: 2024-09-25
Payer: MEDICARE

## 2024-09-25 VITALS — BODY MASS INDEX: 27.47 KG/M2 | HEIGHT: 67 IN | WEIGHT: 175 LBS

## 2024-09-25 DIAGNOSIS — M17.12 PRIMARY OSTEOARTHRITIS OF LEFT KNEE: ICD-10-CM

## 2024-09-25 DIAGNOSIS — M25.562 LEFT KNEE PAIN, UNSPECIFIED CHRONICITY: Primary | ICD-10-CM

## 2024-09-25 PROCEDURE — 1123F ACP DISCUSS/DSCN MKR DOCD: CPT

## 2024-09-25 PROCEDURE — 20610 DRAIN/INJ JOINT/BURSA W/O US: CPT

## 2024-09-25 PROCEDURE — 99203 OFFICE O/P NEW LOW 30 MIN: CPT

## 2024-09-25 RX ORDER — METHYLPREDNISOLONE ACETATE 80 MG/ML
80 INJECTION, SUSPENSION INTRA-ARTICULAR; INTRALESIONAL; INTRAMUSCULAR; SOFT TISSUE ONCE
Status: COMPLETED | OUTPATIENT
Start: 2024-09-25 | End: 2024-09-25

## 2024-09-25 RX ORDER — BUPIVACAINE HYDROCHLORIDE 2.5 MG/ML
2 INJECTION, SOLUTION INFILTRATION; PERINEURAL ONCE
Status: COMPLETED | OUTPATIENT
Start: 2024-09-25 | End: 2024-09-25

## 2024-09-25 RX ADMIN — BUPIVACAINE HYDROCHLORIDE 2 ML: 2.5 INJECTION, SOLUTION INFILTRATION; PERINEURAL at 11:39

## 2024-09-25 RX ADMIN — METHYLPREDNISOLONE ACETATE 80 MG: 80 INJECTION, SUSPENSION INTRA-ARTICULAR; INTRALESIONAL; INTRAMUSCULAR; SOFT TISSUE at 11:39

## 2024-09-26 ENCOUNTER — CLINICAL DOCUMENTATION (OUTPATIENT)
Dept: SPIRITUAL SERVICES | Age: 84
End: 2024-09-26

## 2024-10-03 ENCOUNTER — HOSPITAL ENCOUNTER (OUTPATIENT)
Dept: PAIN MANAGEMENT | Age: 84
Discharge: HOME | End: 2024-10-03
Payer: MEDICARE

## 2024-10-03 VITALS — HEIGHT: 67 IN | WEIGHT: 178 LBS | BODY MASS INDEX: 27.94 KG/M2

## 2024-10-03 DIAGNOSIS — M17.12 PRIMARY OSTEOARTHRITIS OF LEFT KNEE: ICD-10-CM

## 2024-10-03 DIAGNOSIS — M47.816 LUMBAR FACET ARTHROPATHY: ICD-10-CM

## 2024-10-03 DIAGNOSIS — M47.817 LUMBOSACRAL SPONDYLOSIS WITHOUT MYELOPATHY: Primary | Chronic | ICD-10-CM

## 2024-10-03 PROCEDURE — 99213 OFFICE O/P EST LOW 20 MIN: CPT

## 2024-10-03 PROCEDURE — 99214 OFFICE O/P EST MOD 30 MIN: CPT | Performed by: ANESTHESIOLOGY

## 2024-10-03 RX ORDER — LIDOCAINE 50 MG/G
1 PATCH TOPICAL DAILY
Qty: 30 PATCH | Refills: 0 | Status: SHIPPED | OUTPATIENT
Start: 2024-10-03

## 2024-10-03 ASSESSMENT — PAIN SCALES - GENERAL: PAINLEVEL_OUTOF10: 6

## 2024-10-03 ASSESSMENT — ENCOUNTER SYMPTOMS
CHEST TIGHTNESS: 0
SHORTNESS OF BREATH: 0
CONSTIPATION: 0
NAUSEA: 0
GASTROINTESTINAL NEGATIVE: 1
VOMITING: 0
RESPIRATORY NEGATIVE: 1
DIARRHEA: 0
SORE THROAT: 0
BACK PAIN: 1

## 2024-10-03 ASSESSMENT — PAIN DESCRIPTION - ORIENTATION: ORIENTATION: LOWER

## 2024-10-03 ASSESSMENT — PAIN DESCRIPTION - PAIN TYPE: TYPE: CHRONIC PAIN

## 2024-10-03 ASSESSMENT — PAIN DESCRIPTION - FREQUENCY: FREQUENCY: CONTINUOUS

## 2024-10-03 ASSESSMENT — PAIN DESCRIPTION - LOCATION: LOCATION: BACK;KNEE

## 2024-10-03 ASSESSMENT — PAIN DESCRIPTION - DESCRIPTORS: DESCRIPTORS: ACHING

## 2024-10-03 NOTE — H&P (VIEW-ONLY)
daily as needed for Pain 100 g 5    nitroGLYCERIN (NITROSTAT) 0.4 MG SL tablet up to max of 3 total doses. If no relief after 1 dose, call 911. 25 tablet 0    magnesium oxide (MAG-OX) 400 (240 Mg) MG tablet Take 1 tablet by mouth daily (Patient not taking: Reported on 9/26/2024) 90 tablet 3     No current facility-administered medications for this encounter.       Review of Systems   Constitutional: Negative.  Negative for chills and fever.   HENT: Negative.  Negative for sneezing and sore throat.    Respiratory: Negative.  Negative for chest tightness and shortness of breath.    Cardiovascular: Negative.  Negative for chest pain and palpitations.   Gastrointestinal: Negative.  Negative for constipation, diarrhea, nausea and vomiting.   Musculoskeletal:  Positive for back pain and gait problem. Negative for joint swelling.   Neurological:  Positive for weakness. Negative for numbness.         Objective:  General Appearance:  Uncomfortable and in pain.    Vital signs: (most recent): Height 1.702 m (5' 7\"), weight 80.7 kg (178 lb).  Vital signs are normal.  No fever.    Output: Producing urine and producing stool.    HEENT: Normal HEENT exam.    Lungs:  Normal effort and normal respiratory rate.  He is not in respiratory distress.    Heart: Normal rate.    Extremities: Normal range of motion.  There is no deformity.    Neurological: Patient is alert and oriented to person, place and time.  Normal strength.  Patient has normal coordination.    Pupils:  Pupils are equal, round, and reactive to light.  Pupils are equal.   Skin:  Warm and dry.  No rash or cyanosis.   Lumbar spine  Scoliotic deformity and kyphotic deformity on inspection  Gait antalgic  Tenderness to palpation in lumbar area  Facet loading positive    Assessment & Plan  1. Lumbosacral spondylosis without myelopathy    2. Lumbar facet arthropathy    3. Primary osteoarthritis of left knee        Orders Placed This Encounter   Procedures    INJ DX/THER AGNT

## 2024-10-03 NOTE — PROGRESS NOTES
The patient is a 84 y.o.Non- / non  male.    Chief Complaint   Patient presents with    Back Pain    Knee Pain      84-year-old gentleman with multiple major comorbidities  He is seen in our clinic for chronic multisite body pain  Had chronic left knee pain related to left knee arthritis, had left knee injection more than 6 months ago with significant improvement in knee pain lasting for more than 3 months  Knee pain is now back to the baseline    Main issue today is back pain  Predominantly axial located in the lower lumbar area across midline going on for many years describes it as constant aching stiffness in the lower back  No significant dermatomal radiation  Have done physical therapy  MRI lumbar spine had shown multilevel lumbar facet arthropathy  Patient had bilateral lumbar diagnostic medial branch nerve block at L4-5 and L5-S1 facet with fluoroscopy guidance that provided him short-term 80% plus improvement with improved range of motion, pain then returned back to the baseline    He had other medical conditions exacerbation and he was not able to follow-up for the second test and nerve ablation  Wants to proceed with completion of the lumbar area treatment now    Clinical presentation suggest facet mediated pain  Patient has failed conservative measures  Oswestry disability index to moderate disability associated with pain    Will schedule for confirmatory nerve block at L4-5 L5-S1 facet with fluoroscopy guidance  If this provide good short-term relief 80% plus improvement then we will proceed with radiofrequency ablation    May consider for repeat left knee injection after treating for lumbar spine pain      Patient is here today for: Back, Left Knee pain, Disucss injections   Pain level: 6  Character: aching   Radiating: up left thigh    Weakness or numbness: weakness in left knee   Aggravating Factors: standing, walking   Alleviating Factors: bengay and volteran gel   Constant or

## 2024-10-04 DIAGNOSIS — G20.A1 PARKINSON'S DISEASE, UNSPECIFIED WHETHER DYSKINESIA PRESENT, UNSPECIFIED WHETHER MANIFESTATIONS FLUCTUATE (HCC): ICD-10-CM

## 2024-10-04 DIAGNOSIS — K20.90 ESOPHAGITIS: ICD-10-CM

## 2024-10-06 RX ORDER — FUROSEMIDE 40 MG
TABLET ORAL
Qty: 30 TABLET | Refills: 0 | Status: SHIPPED | OUTPATIENT
Start: 2024-10-06

## 2024-10-07 RX ORDER — GABAPENTIN 100 MG/1
100 CAPSULE ORAL DAILY
Qty: 30 CAPSULE | Refills: 1 | Status: SHIPPED | OUTPATIENT
Start: 2024-10-07 | End: 2024-12-06

## 2024-10-07 RX ORDER — SUCRALFATE 1 G/1
TABLET ORAL
Qty: 120 TABLET | Refills: 4 | Status: SHIPPED | OUTPATIENT
Start: 2024-10-07

## 2024-10-07 RX ORDER — ROPINIROLE 0.5 MG/1
TABLET, FILM COATED ORAL
Qty: 90 TABLET | Refills: 4 | Status: SHIPPED | OUTPATIENT
Start: 2024-10-07

## 2024-10-07 RX ORDER — PANTOPRAZOLE SODIUM 20 MG/1
20 TABLET, DELAYED RELEASE ORAL DAILY
Qty: 90 TABLET | Refills: 0 | Status: SHIPPED | OUTPATIENT
Start: 2024-10-07

## 2024-10-07 NOTE — TELEPHONE ENCOUNTER
E-scribe request for pended medications. Please review and e-scribe if applicable.     Last Visit Date:  9/9/2024  Next Visit Date:  10/4/2024    Hemoglobin A1C (%)   Date Value   09/09/2024 6.5   11/06/2023 6.4 (H)   06/08/2022 6.4 (H)             ( goal A1C is < 7)   No components found for: \"LABMICR\"  No components found for: \"LDLCHOLESTEROL\", \"LDLCALC\"    (goal LDL is <100)   AST (U/L)   Date Value   08/27/2024 31     ALT (U/L)   Date Value   08/27/2024 11     BUN (mg/dL)   Date Value   08/27/2024 33 (H)     BP Readings from Last 3 Encounters:   09/26/24 128/78   09/19/24 110/68   09/09/24 103/70          (goal 120/80)        Patient Active Problem List:     Temporary loss of eyesight     Syncope : posterior circulation stroke vs Neurocardiogenic syncope      Intracranial bleed : traumatic left sub-dural hematoma ,managed conservatively in 2011     Headache     CKD (chronic kidney disease) stage 3, GFR 30-59 ml/min ([facility])     Depression     Hyperlipidemia     Microhematuria     Microalbuminuria     Essential hypertension     Generalized abdominal pain     Tubular adenoma of colon     Diverticulosis of colon     History of skull fracture     Nausea     Pure hypercholesterolemia     Prediabetes     Parkinson disease ([facility])     Chronic post-traumatic headache, not intractable     Fall (on) (from) other stairs and steps, initial encounter     Strain of iliopsoas muscle, initial encounter     Chronic pain of left knee     Closed fracture of second toe of right foot     Closed fracture of second toe of left foot     Abnormal ECG     Cerebrovascular accident (HCC)     Chest pain, unspecified     Hematoma of scalp     Left ventricular hypertrophy     Mild aortic valve regurgitation     Pulmonary hypertension, mild (HCC)     Lumbar radiculopathy     Dizziness     Hyponatremia     Vomiting     General weakness     Overweight (BMI 25.0-29.9)     Frequent falls     Unspecified inflammatory spondylopathy, 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

## 2024-10-08 DIAGNOSIS — G20.A1 PARKINSON'S DISEASE, UNSPECIFIED WHETHER DYSKINESIA PRESENT, UNSPECIFIED WHETHER MANIFESTATIONS FLUCTUATE (HCC): ICD-10-CM

## 2024-10-08 DIAGNOSIS — E78.00 PURE HYPERCHOLESTEROLEMIA: ICD-10-CM

## 2024-10-08 RX ORDER — ENTACAPONE 200 MG/1
TABLET ORAL
Qty: 360 TABLET | Refills: 0 | Status: SHIPPED | OUTPATIENT
Start: 2024-10-08

## 2024-10-08 RX ORDER — ATORVASTATIN CALCIUM 40 MG/1
TABLET, FILM COATED ORAL
Qty: 90 TABLET | Refills: 3 | Status: SHIPPED | OUTPATIENT
Start: 2024-10-08

## 2024-10-08 RX ORDER — FUROSEMIDE 40 MG
TABLET ORAL
Qty: 30 TABLET | Refills: 0 | OUTPATIENT
Start: 2024-10-08

## 2024-10-08 NOTE — TELEPHONE ENCOUNTER
Please address the medication refill and close the encounter.  If I can be of assistance, please route to the applicable pool.      Thank you.      Last visit: 9-9-24  Last Med refill: 6-  Does patient have enough medication for 72 hours: No:     Next Visit Date:  Future Appointments   Date Time Provider Department Center   10/28/2024 11:20 AM Tisha Glasgow MD [facility] [place]   10/29/2024  1:00 PM Abraham Cash MD Neuro [facility] -   10/30/2024 12:45 PM Chuy Bailey MD [facility] [place]   11/6/2024 10:30 AM SCHEDULE, [id number]   11/8/2024  9:00 AM Ramon Milan, APRN - CNP Resp Spec [email or web address]   3/27/2025 10:45 AM Omid Stone MD [facility]       Health Maintenance   Topic Date Due    Shingles vaccine (1 of 2) Never done    Respiratory Syncytial Virus (RSV) Pregnant or age 60 yrs+ (1 - 1-dose 60+ series) Never done    Colorectal Cancer Screen  09/19/2018    Hepatitis B vaccine (2 of 3 - 19+ 3-dose series) 12/14/2023    Flu vaccine (1) 08/01/2024    COVID-19 Vaccine (6 - 2023-24 season) 09/01/2024    Lipids  12/15/2024    Depression Monitoring  09/09/2025    DTaP/Tdap/Td vaccine (4 - Td or Tdap) 11/16/2033    Annual Wellness Visit (Medicare Advantage)  Completed    Pneumococcal 65+ years Vaccine  Completed    Hepatitis A vaccine  Aged Out    Hib vaccine  Aged Out    Polio vaccine  Aged Out    Meningococcal (ACWY) vaccine  Aged Out       Hemoglobin A1C (%)   Date Value   09/09/2024 6.5   11/06/2023 6.4 (H)   06/08/2022 6.4 (H)             ( goal A1C is < 7)   No components found for: \"LABMICR\"  No components found for: \"LDLCHOLESTEROL\", \"LDLCALC\"    (goal LDL is <100)   AST (U/L)   Date Value   08/27/2024 31     ALT (U/L)   Date Value   08/27/2024 11     BUN (mg/dL)   Date Value   08/27/2024 33 (H)     BP Readings from Last 3 Encounters:   09/26/24 128/78   09/19/24 110/68   09/09/24 103/70          (goal 120/80)    All Future Testing

## 2024-10-10 RX ORDER — LATANOPROST 50 UG/ML
SOLUTION/ DROPS OPHTHALMIC
Qty: 2.5 ML | Refills: 0 | Status: SHIPPED | OUTPATIENT
Start: 2024-10-10

## 2024-10-25 RX ORDER — FUROSEMIDE 40 MG/1
TABLET ORAL
Qty: 30 TABLET | Refills: 2 | Status: SHIPPED | OUTPATIENT
Start: 2024-10-25

## 2024-10-25 NOTE — TELEPHONE ENCOUNTER
injury superimposed on CKD (HCC)     Acute heart failure (HCC)     Biventricular congestive heart failure (HCC)     Heart block AV complete (HCC)     1st degree AV block     Septicemia (HCC)

## 2024-10-28 ENCOUNTER — OFFICE VISIT (OUTPATIENT)
Dept: FAMILY MEDICINE CLINIC | Age: 84
End: 2024-10-28

## 2024-10-28 VITALS
DIASTOLIC BLOOD PRESSURE: 70 MMHG | SYSTOLIC BLOOD PRESSURE: 109 MMHG | HEIGHT: 67 IN | HEART RATE: 70 BPM | BODY MASS INDEX: 27.88 KG/M2

## 2024-10-28 DIAGNOSIS — M17.12 PRIMARY OSTEOARTHRITIS OF LEFT KNEE: Primary | ICD-10-CM

## 2024-10-28 RX ORDER — AMLODIPINE BESYLATE 5 MG/1
5 TABLET ORAL DAILY
COMMUNITY
Start: 2024-10-25

## 2024-10-28 RX ORDER — LIDOCAINE HYDROCHLORIDE 10 MG/ML
5 INJECTION, SOLUTION INFILTRATION; PERINEURAL ONCE
Status: COMPLETED | OUTPATIENT
Start: 2024-10-28 | End: 2024-10-28

## 2024-10-28 RX ORDER — FLUTICASONE PROPIONATE 50 MCG
2 SPRAY, SUSPENSION (ML) NASAL DAILY
Qty: 16 G | Refills: 0 | Status: SHIPPED | OUTPATIENT
Start: 2024-10-28

## 2024-10-28 RX ORDER — TRIAMCINOLONE ACETONIDE 40 MG/ML
40 INJECTION, SUSPENSION INTRA-ARTICULAR; INTRAMUSCULAR ONCE
Status: COMPLETED | OUTPATIENT
Start: 2024-10-28 | End: 2024-10-28

## 2024-10-28 RX ORDER — CETIRIZINE HYDROCHLORIDE 10 MG/1
5 TABLET ORAL DAILY
Qty: 30 TABLET | Refills: 0 | Status: SHIPPED | OUTPATIENT
Start: 2024-10-28

## 2024-10-28 RX ADMIN — LIDOCAINE HYDROCHLORIDE 5 ML: 10 INJECTION, SOLUTION INFILTRATION; PERINEURAL at 12:46

## 2024-10-28 RX ADMIN — TRIAMCINOLONE ACETONIDE 40 MG: 40 INJECTION, SUSPENSION INTRA-ARTICULAR; INTRAMUSCULAR at 12:43

## 2024-10-28 NOTE — PROGRESS NOTES
[facility] - Outpatient Note      Subjective:    Mina Gill is a 84 y.o. male with  has a past medical history of 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 ([facility]), CKD (chronic kidney disease) stage 2, GFR 60-89 ml/min, CKD (chronic kidney disease) stage 3, GFR 30-59 ml/min (HCC), Diverticulosis of colon, GERD (gastroesophageal reflux disease), History of non-ST elevation myocardial infarction (NSTEMI) 6/2022, Impaired renal function, MRSA (methicillin resistant staph aureus) culture positive, Osteoarthritis of knee, Parkinson disease ([facility]), Proteinuria, Skull fracture, Temporary loss of eyesight, and Tubular adenoma of colon.    Presented to the office today for:  No chief complaint on file.      HPI  Patient is a 84-year-old male with significant past medical history of CKD, Parkinson's disease, complete heart block status post pacemaker AICD presented to clinic for follow-up.  Patient follows up with electrophysiologist Dr. Stone for his complete heart block .  As per cardiologist patient appears to be in stable chronic heart failure with preserved ejection fraction.  Patient also was previously admitted to [facility] in their ICU due to complete heart block alongside aspiration pneumonia.  Patient completed his antibiotic regimen regimen and followed up with infectious disease who cleared the patient.  Patient has chronic left knee pain which showed evidence of degenerative joint disease.  Follows up with orthopedics who states patient should consider conservative management pain.        Review of Systems   Constitutional:  Negative for appetite change, diaphoresis, fatigue and fever.   Respiratory:  Negative for cough and shortness of breath.    Cardiovascular:  Negative for chest pain and leg swelling.   Gastrointestinal:  Negative for nausea and vomiting.   Musculoskeletal:  Positive for arthralgias (Chronic), back pain, gait

## 2024-10-28 NOTE — PROGRESS NOTES
Attending Physician Statement  I  have discussed the care of Mina Gill including pertinent history and exam findings with the resident. I agree with the assessment, plan and orders as documented by the resident.      /70 (Site: Right Upper Arm, Position: Sitting, Cuff Size: Medium Adult)   Pulse 70   Ht 1.702 m (5' 7\")   BMI 27.88 kg/m²    BP Readings from Last 3 Encounters:   11/25/24 115/63   11/08/24 134/71   10/30/24 (!) 129/97     Wt Readings from Last 3 Encounters:   12/04/24 80.7 kg (178 lb)   11/19/24 80.7 kg (178 lb)   11/08/24 80.7 kg (178 lb)          Diagnosis Orders   1. Primary osteoarthritis of left knee  triamcinolone acetonide (KENALOG-40) injection 40 mg    lidocaine 1 % injection 5 mL    Joint Aspiration/Injection          CAD- HFpEF- pacemaker   Left knee arthritis- orthopedic follow up, PT, not surgical candidate .   PM- back injections.   Parkinson- Neurology     Chris Love DO 12/6/2024 2:08 PM

## 2024-10-28 NOTE — PROGRESS NOTES
Visit Information    Have you changed or started any medications since your last visit including any over-the-counter medicines, vitamins, or herbal medicines? no   Have you stopped taking any of your medications? Is so, why? -  no  Are you having any side effects from any of your medications? - no    Have you seen any other physician or provider since your last visit?  yes - Pain Management 10/3/24, Cardiology 9/26/24 Ortho 9/25/24, Infectious Disease 9/19/24   Have you had any other diagnostic tests since your last visit?  yes - XR 9/25/24   Have you been seen in the emergency room and/or had an admission in a hospital since we last saw you?  no   Have you had your routine dental cleaning in the past 6 months?  no     Do you have an active MyChart account? If no, what is the barrier?  Yes    Patient Care Team:  Tisha Glasgow MD as PCP - General (Family Medicine)  Maria Elena Farooq MD (Neurology)  Felisa John MD (Nephrology)  Bo Lindsey MD as Consulting Physician (Cardiovascular Disease)  Ross Garcia MD as Consulting Physician (Gastroenterology)  Delfin Frausto as Health   Slime Baker APRN - CNP as Nurse Practitioner (Nurse Practitioner)  Chuy Bailey MD as Consulting Physician (Pain Management)    Medical History Review  Past Medical, Family, and Social History reviewed and does contribute to the patient presenting condition    Health Maintenance   Topic Date Due    Shingles vaccine (1 of 2) Never done    Respiratory Syncytial Virus (RSV) Pregnant or age 60 yrs+ (1 - 1-dose 60+ series) Never done    Colorectal Cancer Screen  09/19/2018    Hepatitis B vaccine (2 of 3 - 19+ 3-dose series) 12/14/2023    Flu vaccine (1) 08/01/2024    COVID-19 Vaccine (6 - 2023-24 season) 09/01/2024    Lipids  12/15/2024    Depression Monitoring  09/09/2025    DTaP/Tdap/Td vaccine (4 - Td or Tdap) 11/16/2033    Annual Wellness Visit (Medicare Advantage)  Completed    Pneumococcal 65+ years

## 2024-10-29 ENCOUNTER — OFFICE VISIT (OUTPATIENT)
Dept: NEUROLOGY | Age: 84
End: 2024-10-29
Payer: MEDICARE

## 2024-10-29 VITALS
BODY MASS INDEX: 27.62 KG/M2 | HEIGHT: 67 IN | DIASTOLIC BLOOD PRESSURE: 76 MMHG | SYSTOLIC BLOOD PRESSURE: 126 MMHG | HEART RATE: 84 BPM | WEIGHT: 176 LBS

## 2024-10-29 DIAGNOSIS — G43.009 MIGRAINE WITHOUT AURA AND WITHOUT STATUS MIGRAINOSUS, NOT INTRACTABLE: ICD-10-CM

## 2024-10-29 DIAGNOSIS — K11.7 DROOLING: ICD-10-CM

## 2024-10-29 DIAGNOSIS — G44.329 CHRONIC POST-TRAUMATIC HEADACHE, NOT INTRACTABLE: ICD-10-CM

## 2024-10-29 DIAGNOSIS — G25.2 RESTING TREMOR: ICD-10-CM

## 2024-10-29 DIAGNOSIS — G20.A1 PARKINSON'S DISEASE WITHOUT FLUCTUATING MANIFESTATIONS, UNSPECIFIED WHETHER DYSKINESIA PRESENT (HCC): Primary | ICD-10-CM

## 2024-10-29 PROCEDURE — 3074F SYST BP LT 130 MM HG: CPT

## 2024-10-29 PROCEDURE — 99214 OFFICE O/P EST MOD 30 MIN: CPT

## 2024-10-29 PROCEDURE — 1123F ACP DISCUSS/DSCN MKR DOCD: CPT

## 2024-10-29 PROCEDURE — 3078F DIAST BP <80 MM HG: CPT

## 2024-10-29 RX ORDER — UBROGEPANT 50 MG/1
50 TABLET ORAL PRN
Qty: 14 TABLET | Refills: 1 | Status: SHIPPED | OUTPATIENT
Start: 2024-10-29 | End: 2025-02-11

## 2024-10-29 RX ORDER — LANOLIN ALCOHOL/MO/W.PET/CERES
400 CREAM (GRAM) TOPICAL DAILY
Qty: 90 TABLET | Refills: 3 | Status: SHIPPED | OUTPATIENT
Start: 2024-10-29

## 2024-10-29 RX ORDER — CARBIDOPA, LEVODOPA AND ENTACAPONE 50; 200; 200 MG/1; MG/1; MG/1
1 TABLET, FILM COATED ORAL 3 TIMES DAILY
Qty: 90 TABLET | Refills: 3 | Status: SHIPPED | OUTPATIENT
Start: 2024-10-29

## 2024-10-29 RX ORDER — RIMEGEPANT SULFATE 75 MG/75MG
75 TABLET, ORALLY DISINTEGRATING ORAL PRN
Qty: 14 TABLET | Refills: 2 | Status: SHIPPED | OUTPATIENT
Start: 2024-10-29 | End: 2024-10-29

## 2024-10-30 ENCOUNTER — HOSPITAL ENCOUNTER (OUTPATIENT)
Dept: PAIN MANAGEMENT | Facility: CLINIC | Age: 84
Discharge: HOME | End: 2024-10-30
Payer: MEDICARE

## 2024-10-30 VITALS
WEIGHT: 179 LBS | TEMPERATURE: 97 F | DIASTOLIC BLOOD PRESSURE: 97 MMHG | HEIGHT: 67 IN | RESPIRATION RATE: 12 BRPM | HEART RATE: 69 BPM | OXYGEN SATURATION: 98 % | SYSTOLIC BLOOD PRESSURE: 129 MMHG | BODY MASS INDEX: 28.09 KG/M2

## 2024-10-30 DIAGNOSIS — R52 PAIN MANAGEMENT: ICD-10-CM

## 2024-10-30 DIAGNOSIS — M47.817 LUMBOSACRAL SPONDYLOSIS WITHOUT MYELOPATHY: Primary | Chronic | ICD-10-CM

## 2024-10-30 PROCEDURE — 64494 INJ PARAVERT F JNT L/S 2 LEV: CPT

## 2024-10-30 PROCEDURE — 6360000004 HC RX CONTRAST MEDICATION: Performed by: ANESTHESIOLOGY

## 2024-10-30 PROCEDURE — 64494 INJ PARAVERT F JNT L/S 2 LEV: CPT | Performed by: ANESTHESIOLOGY

## 2024-10-30 PROCEDURE — 64493 INJ PARAVERT F JNT L/S 1 LEV: CPT | Performed by: ANESTHESIOLOGY

## 2024-10-30 PROCEDURE — 2500000003 HC RX 250 WO HCPCS: Performed by: ANESTHESIOLOGY

## 2024-10-30 PROCEDURE — 64493 INJ PARAVERT F JNT L/S 1 LEV: CPT

## 2024-10-30 PROCEDURE — 6360000002 HC RX W HCPCS: Performed by: ANESTHESIOLOGY

## 2024-10-30 RX ORDER — BUPIVACAINE HYDROCHLORIDE 5 MG/ML
INJECTION, SOLUTION EPIDURAL; INTRACAUDAL
Status: COMPLETED | OUTPATIENT
Start: 2024-10-30 | End: 2024-10-30

## 2024-10-30 RX ORDER — AMITRIPTYLINE HYDROCHLORIDE 10 MG/1
10 TABLET ORAL NIGHTLY
COMMUNITY

## 2024-10-30 RX ORDER — ENTACAPONE 200 MG/1
200 TABLET ORAL 3 TIMES DAILY
COMMUNITY

## 2024-10-30 RX ORDER — LIDOCAINE HYDROCHLORIDE 10 MG/ML
INJECTION, SOLUTION EPIDURAL; INFILTRATION; INTRACAUDAL; PERINEURAL
Status: COMPLETED | OUTPATIENT
Start: 2024-10-30 | End: 2024-10-30

## 2024-10-30 RX ADMIN — LIDOCAINE HYDROCHLORIDE 5 ML: 10 INJECTION, SOLUTION EPIDURAL; INFILTRATION; INTRACAUDAL at 12:57

## 2024-10-30 RX ADMIN — IOHEXOL 3 ML: 180 INJECTION INTRAVENOUS at 12:57

## 2024-10-30 RX ADMIN — BUPIVACAINE HYDROCHLORIDE 5 ML: 5 INJECTION, SOLUTION EPIDURAL; INTRACAUDAL; PERINEURAL at 12:58

## 2024-10-30 ASSESSMENT — PAIN - FUNCTIONAL ASSESSMENT: PAIN_FUNCTIONAL_ASSESSMENT: 0-10

## 2024-10-30 ASSESSMENT — PAIN DESCRIPTION - DESCRIPTORS: DESCRIPTORS: SHARP

## 2024-10-30 NOTE — DISCHARGE INSTRUCTIONS
Discharge Instructions following Sedation or Anesthesia:  You have  received  a sedative/anesthetic therefore, you should not consume any alcoholic beverages for minimum of 12 hours.  Do not drive or operate machinery for 24 hours.  Do not sign legal documents for 24 hours.  Dizziness, drowsiness, and unsteadiness may occur.  Rest when need to.  Increase diet as tolerated.  Keep up on fluids if diet allows.      General Instructions:  Do not take a tub bath for 72 hours after procedure (this includes hot tubs and swimming pools).  You may shower, but avoid hot water to injection site.   Avoid strenuous activity TODAY especially if you experience dizziness.   Remove band-aid the next day.  Wash off any residual iodine   Do not use heat, heating pad, or any other heating device over the injection site for 3 days after the procedure.  If you experience pain after your procedure, you may continue with your current pain medication as prescribed.  (DO NOT INCREASE YOUR PAIN MEDICATION WITHOUT TALKING TO DOCTOR)  Soreness and pain at injection site is common, may use ice to reduce soreness.    Please complete pain diary as instructed.     Call [facility] at [phone] if you experience:   Fever, chills or temperature over 100    Vomiting, Headache, persistent stiff neck, nausea, blurred vision   Difficulty in urinating or unable to urinate with 8 hours   Increase in weakness, numbness or loss of function   Increased redness, swelling or drainage at the injection site

## 2024-10-30 NOTE — OP NOTE
Patient Name: Mina Gill   YOB: 1940  Room/Bed: Room/bed info not found  Medical Record Number: [id number]  Date: 10/30/2024       Sedation/ Anesthesia Plan:   intravenous sedation   as needed.    Medications Planned:   midazolam (Versed) / Fentanyl  Intravenously  as needed.    Preoperative Diagnosis: Lumbar spondylosis w/o myelopathy or radiculopathy  Postoperative Diagnosis: Lumbar spondylosis w/o myelopathy or radiculopathy  Blood Loss: none    Procedure Performed:  Bilateral Lumbar Medial Branch nerve Blocks at the transverse processes of L4, L5 and sacral  ala under fluoroscopy guidance    Procedure:      The Patient was seen in the preop area, chart was reviewed, informed consent was obtained. Patient was taken to procedure room and was placed in prone position. Vital signs were monitored through out the  Procedure. A time out was completed.  The skin over the back was prepped and draped in sterile manner.     The target point was marked at the junction of Transverse process and superior articular process at the target levels.  Skin and deep tissues were anesthetized with 1 % lidocaine. A 25-gauge needlele was advanced to the target spots under fluoroscopy guidance in AP / Lateral and Oblique views.     Then after negative aspiration contrast dye was injected with live fluoroscopy in AP views that showed  spread of the contrast with no epidural space and no vascular runoff or intrathecal spread.    Finally 0.5 ml of treatment solution 0.5% BUPIVACAINE  was injected at each level.  The needle was removed and a Band-Aid was placed over the needle  insertion site.  The patient's vital signs remained stable and the patient tolerated the procedure well.        Electronically signed by Chuy Bailey MD on 10/30/2024 at 1:09 PM

## 2024-10-30 NOTE — INTERVAL H&P NOTE
Update History & Physical    The patient's History and Physical of October 3, 2024 was reviewed with the patient and I examined the patient. There was no change. The surgical site was confirmed by the patient and me.     Plan: The risks, benefits, expected outcome, and alternative to the recommended procedure have been discussed with the patient. Patient understands and wants to proceed with the procedure.     Asa 3  Mp 3    Electronically signed by Chuy Bailey MD on 10/30/2024 at 12:03 PM

## 2024-10-31 ENCOUNTER — TELEPHONE (OUTPATIENT)
Dept: PAIN MANAGEMENT | Age: 84
End: 2024-10-31

## 2024-11-03 NOTE — PROGRESS NOTES
Subjective:      Patient ID: Mina Gill is a 84 y.o. male.     Patient must see physician at next appointment    HPI  Patient here for post hospital follow-up. Patient was admitted 8/3/2024 through 8/14/2024 with a past medical history of Parkinson's, was in complete heart block after presenting to the emergency room with chest pain and shortness of breath.  Cardiology was consulted and patient was transferred to ICU for temporary pacemaker placement.  He underwent permanent pacemaker placement for the same reason.  Patient also was treated for aspiration pneumonia while inpatient secondary to dysphagia due to autonomic dysfunction from Parkinson's.  Treated the course of antibiotics and was seen by speech therapy with improvement.  Patient was cleared from a medical standpoint and discharged with instructions to follow-up with pulmonary as an outpatient.    Today's visit:     Patient accompanied by a friend, patient with underlying Parkinson's, having difficulty with understanding patient due to his speech pattern and his underlying accent.  Patient likely with underlying COPD given patient's reported 90-pack-year smoking history.  He denies any shortness of breath, and then states that he does have shortness of breath with exertion.  He attributes to his difficulties with exertional dyspnea due to his knee.        Medications:   No pulmonary meds      Smoking status:  Cigarettes: Former smoker- Quit in 1974 previously 3 packs per day for 30 years per patient  Illicit: no  Vaping: no    Exposure History:  Employment: [employer]  Travel out of country: [place]  Chemical exposure: no  Pets at home: no  Previous Bird/turtle exposure: no  Indoor hot tub/sauna's: no  Family history of lung cancer/lung disease: no  IV drug abuse: no    PRIOR WORKUP:  PFT:  PFT 11/8/2024: FVC 2.67, 94% of predicted, FEV1 2.01, 94% of predicted.  FEV1/FVC ratio 75, 97% of predicted.  FEF 25-75 1.93, 92% of predicted.  ERV 0.34, 66%

## 2024-11-06 RX ORDER — ISOSORBIDE MONONITRATE 30 MG/1
30 TABLET, EXTENDED RELEASE ORAL DAILY
Qty: 90 TABLET | Refills: 0 | Status: SHIPPED | OUTPATIENT
Start: 2024-11-06

## 2024-11-07 ENCOUNTER — TELEPHONE (OUTPATIENT)
Dept: NEUROLOGY | Age: 84
End: 2024-11-07

## 2024-11-07 NOTE — TELEPHONE ENCOUNTER
[facility] called and was needing clarification on medications. Stated that the script that was sent for Carbidopa-levodopa-entacapone -200, needs clarifying if we are filling that script and discontinuing the other scripts that they have for the Carbidopa-levodopa  and Entacapone 200 mg? Looks like in system like they are discontinued, just need clarifying as they have refills on them. Can call back at [phone]

## 2024-11-08 ENCOUNTER — OFFICE VISIT (OUTPATIENT)
Dept: PULMONOLOGY | Age: 84
End: 2024-11-08

## 2024-11-08 VITALS
BODY MASS INDEX: 27.94 KG/M2 | WEIGHT: 178 LBS | RESPIRATION RATE: 16 BRPM | HEART RATE: 71 BPM | DIASTOLIC BLOOD PRESSURE: 71 MMHG | OXYGEN SATURATION: 99 % | HEIGHT: 67 IN | SYSTOLIC BLOOD PRESSURE: 134 MMHG

## 2024-11-08 DIAGNOSIS — M25.562 CHRONIC PAIN OF LEFT KNEE: ICD-10-CM

## 2024-11-08 DIAGNOSIS — G89.29 CHRONIC PAIN OF LEFT KNEE: ICD-10-CM

## 2024-11-08 DIAGNOSIS — I44.2 COMPLETE HEART BLOCK (HCC): ICD-10-CM

## 2024-11-08 DIAGNOSIS — J44.9 CHRONIC OBSTRUCTIVE PULMONARY DISEASE, UNSPECIFIED COPD TYPE (HCC): Primary | ICD-10-CM

## 2024-11-08 DIAGNOSIS — I27.20 PULMONARY HYPERTENSION, MILD (HCC): ICD-10-CM

## 2024-11-08 DIAGNOSIS — Z95.5 PRESENCE OF CORONARY ANGIOPLASTY IMPLANT AND GRAFT: ICD-10-CM

## 2024-11-08 RX ORDER — ALBUTEROL SULFATE 90 UG/1
2 INHALANT RESPIRATORY (INHALATION) EVERY 6 HOURS PRN
Qty: 18 G | Refills: 3 | Status: SHIPPED | OUTPATIENT
Start: 2024-11-08

## 2024-11-08 ASSESSMENT — ENCOUNTER SYMPTOMS
GASTROINTESTINAL NEGATIVE: 1
EYES NEGATIVE: 1
ALLERGIC/IMMUNOLOGIC NEGATIVE: 1

## 2024-11-11 DIAGNOSIS — J44.9 CHRONIC OBSTRUCTIVE PULMONARY DISEASE, UNSPECIFIED COPD TYPE (HCC): ICD-10-CM

## 2024-11-19 ENCOUNTER — APPOINTMENT (OUTPATIENT)
Dept: GENERAL RADIOLOGY | Age: 84
End: 2024-11-19
Payer: MEDICARE

## 2024-11-19 ENCOUNTER — HOSPITAL ENCOUNTER (OUTPATIENT)
Age: 84
Setting detail: OBSERVATION
Discharge: NURSING, REHAB OR LONG-TERM CARE | End: 2024-11-25
Attending: EMERGENCY MEDICINE | Admitting: STUDENT IN AN ORGANIZED HEALTH CARE EDUCATION/TRAINING PROGRAM
Payer: MEDICARE

## 2024-11-19 DIAGNOSIS — R53.1 GENERALIZED WEAKNESS: Primary | ICD-10-CM

## 2024-11-19 LAB
ALBUMIN SERPL-MCNC: 4.8 G/DL (ref 3.5–5.2)
ALBUMIN/GLOB SERPL: 1.5 {RATIO} (ref 1–2.5)
ALP SERPL-CCNC: 163 U/L (ref 40–129)
ALT SERPL-CCNC: 7 U/L (ref 10–50)
ANION GAP SERPL CALCULATED.3IONS-SCNC: 15 MMOL/L (ref 9–16)
AST SERPL-CCNC: 35 U/L (ref 10–50)
BASOPHILS # BLD: 0.03 K/UL (ref 0–0.2)
BASOPHILS NFR BLD: 0 % (ref 0–2)
BILIRUB SERPL-MCNC: 0.5 MG/DL (ref 0–1.2)
BILIRUB UR QL STRIP: NEGATIVE
BUN SERPL-MCNC: 24 MG/DL (ref 8–23)
CALCIUM SERPL-MCNC: 10.3 MG/DL (ref 8.6–10.4)
CHLORIDE SERPL-SCNC: 95 MMOL/L (ref 98–107)
CLARITY UR: CLEAR
CO2 SERPL-SCNC: 25 MMOL/L (ref 20–31)
COLOR UR: ABNORMAL
COMMENT: ABNORMAL
CREAT SERPL-MCNC: 1.4 MG/DL (ref 0.7–1.2)
EOSINOPHIL # BLD: 0.18 K/UL (ref 0–0.44)
EOSINOPHILS RELATIVE PERCENT: 3 % (ref 1–4)
ERYTHROCYTE [DISTWIDTH] IN BLOOD BY AUTOMATED COUNT: 14.2 % (ref 11.8–14.4)
GFR, ESTIMATED: 49 ML/MIN/1.73M2
GLUCOSE SERPL-MCNC: 207 MG/DL (ref 74–99)
GLUCOSE UR STRIP-MCNC: NEGATIVE MG/DL
HCT VFR BLD AUTO: 46.1 % (ref 40.7–50.3)
HGB BLD-MCNC: 15.6 G/DL (ref 13–17)
HGB UR QL STRIP.AUTO: NEGATIVE
IMM GRANULOCYTES # BLD AUTO: 0.04 K/UL (ref 0–0.3)
IMM GRANULOCYTES NFR BLD: 1 %
INR PPP: 0.9
KETONES UR STRIP-MCNC: NEGATIVE MG/DL
LEUKOCYTE ESTERASE UR QL STRIP: NEGATIVE
LIPASE SERPL-CCNC: 20 U/L (ref 13–60)
LYMPHOCYTES NFR BLD: 2.12 K/UL (ref 1.1–3.7)
LYMPHOCYTES RELATIVE PERCENT: 30 % (ref 24–43)
MCH RBC QN AUTO: 32.3 PG (ref 25.2–33.5)
MCHC RBC AUTO-ENTMCNC: 33.8 G/DL (ref 28.4–34.8)
MCV RBC AUTO: 95.4 FL (ref 82.6–102.9)
MONOCYTES NFR BLD: 0.46 K/UL (ref 0.1–1.2)
MONOCYTES NFR BLD: 6 % (ref 3–12)
NEUTROPHILS NFR BLD: 60 % (ref 36–65)
NEUTS SEG NFR BLD: 4.33 K/UL (ref 1.5–8.1)
NITRITE UR QL STRIP: NEGATIVE
NRBC BLD-RTO: 0 PER 100 WBC
PH UR STRIP: 6.5 [PH] (ref 5–8)
PLATELET # BLD AUTO: 186 K/UL (ref 138–453)
PMV BLD AUTO: 8.9 FL (ref 8.1–13.5)
POTASSIUM SERPL-SCNC: 3.8 MMOL/L (ref 3.7–5.3)
PROT SERPL-MCNC: 7.9 G/DL (ref 6.6–8.7)
PROT UR STRIP-MCNC: NEGATIVE MG/DL
PROTHROMBIN TIME: 12 SEC (ref 11.7–14.9)
RBC # BLD AUTO: 4.83 M/UL (ref 4.21–5.77)
SODIUM SERPL-SCNC: 135 MMOL/L (ref 136–145)
SP GR UR STRIP: 1.02 (ref 1–1.03)
TROPONIN I SERPL HS-MCNC: 25 NG/L (ref 0–22)
TROPONIN I SERPL HS-MCNC: 26 NG/L (ref 0–22)
UROBILINOGEN UR STRIP-ACNC: NORMAL EU/DL (ref 0–1)
WBC OTHER # BLD: 7.2 K/UL (ref 3.5–11.3)

## 2024-11-19 PROCEDURE — 99285 EMERGENCY DEPT VISIT HI MDM: CPT

## 2024-11-19 PROCEDURE — 85025 COMPLETE CBC W/AUTO DIFF WBC: CPT

## 2024-11-19 PROCEDURE — 83880 ASSAY OF NATRIURETIC PEPTIDE: CPT

## 2024-11-19 PROCEDURE — 6360000002 HC RX W HCPCS: Performed by: NURSE PRACTITIONER

## 2024-11-19 PROCEDURE — 85610 PROTHROMBIN TIME: CPT

## 2024-11-19 PROCEDURE — 81003 URINALYSIS AUTO W/O SCOPE: CPT

## 2024-11-19 PROCEDURE — G0378 HOSPITAL OBSERVATION PER HR: HCPCS

## 2024-11-19 PROCEDURE — 80053 COMPREHEN METABOLIC PANEL: CPT

## 2024-11-19 PROCEDURE — 6370000000 HC RX 637 (ALT 250 FOR IP): Performed by: NURSE PRACTITIONER

## 2024-11-19 PROCEDURE — 2580000003 HC RX 258: Performed by: NURSE PRACTITIONER

## 2024-11-19 PROCEDURE — 83690 ASSAY OF LIPASE: CPT

## 2024-11-19 PROCEDURE — 84484 ASSAY OF TROPONIN QUANT: CPT

## 2024-11-19 PROCEDURE — 93005 ELECTROCARDIOGRAM TRACING: CPT

## 2024-11-19 PROCEDURE — 71046 X-RAY EXAM CHEST 2 VIEWS: CPT

## 2024-11-19 PROCEDURE — 96372 THER/PROPH/DIAG INJ SC/IM: CPT

## 2024-11-19 RX ORDER — POTASSIUM CHLORIDE 1500 MG/1
40 TABLET, EXTENDED RELEASE ORAL PRN
Status: DISCONTINUED | OUTPATIENT
Start: 2024-11-19 | End: 2024-11-25 | Stop reason: HOSPADM

## 2024-11-19 RX ORDER — ENTACAPONE 200 MG/1
200 TABLET ORAL 3 TIMES DAILY
Status: DISCONTINUED | OUTPATIENT
Start: 2024-11-19 | End: 2024-11-25 | Stop reason: HOSPADM

## 2024-11-19 RX ORDER — ACETAMINOPHEN 325 MG/1
650 TABLET ORAL EVERY 6 HOURS PRN
Status: DISCONTINUED | OUTPATIENT
Start: 2024-11-19 | End: 2024-11-20

## 2024-11-19 RX ORDER — CETIRIZINE HYDROCHLORIDE 10 MG/1
5 TABLET ORAL DAILY
Status: DISCONTINUED | OUTPATIENT
Start: 2024-11-20 | End: 2024-11-25 | Stop reason: HOSPADM

## 2024-11-19 RX ORDER — SODIUM CHLORIDE 0.9 % (FLUSH) 0.9 %
5-40 SYRINGE (ML) INJECTION EVERY 12 HOURS SCHEDULED
Status: DISCONTINUED | OUTPATIENT
Start: 2024-11-19 | End: 2024-11-25 | Stop reason: HOSPADM

## 2024-11-19 RX ORDER — SODIUM CHLORIDE 9 MG/ML
INJECTION, SOLUTION INTRAVENOUS PRN
Status: DISCONTINUED | OUTPATIENT
Start: 2024-11-19 | End: 2024-11-25 | Stop reason: HOSPADM

## 2024-11-19 RX ORDER — ATORVASTATIN CALCIUM 40 MG/1
40 TABLET, FILM COATED ORAL DAILY
Status: DISCONTINUED | OUTPATIENT
Start: 2024-11-19 | End: 2024-11-25 | Stop reason: HOSPADM

## 2024-11-19 RX ORDER — AMLODIPINE BESYLATE 10 MG/1
5 TABLET ORAL DAILY
Status: DISCONTINUED | OUTPATIENT
Start: 2024-11-20 | End: 2024-11-25 | Stop reason: HOSPADM

## 2024-11-19 RX ORDER — POLYETHYLENE GLYCOL 3350 17 G/17G
17 POWDER, FOR SOLUTION ORAL DAILY PRN
Status: DISCONTINUED | OUTPATIENT
Start: 2024-11-19 | End: 2024-11-25 | Stop reason: HOSPADM

## 2024-11-19 RX ORDER — HEPARIN SODIUM 5000 [USP'U]/ML
5000 INJECTION, SOLUTION INTRAVENOUS; SUBCUTANEOUS EVERY 8 HOURS SCHEDULED
Status: DISCONTINUED | OUTPATIENT
Start: 2024-11-19 | End: 2024-11-25 | Stop reason: HOSPADM

## 2024-11-19 RX ORDER — LANOLIN ALCOHOL/MO/W.PET/CERES
400 CREAM (GRAM) TOPICAL DAILY
Status: DISCONTINUED | OUTPATIENT
Start: 2024-11-20 | End: 2024-11-25 | Stop reason: HOSPADM

## 2024-11-19 RX ORDER — FUROSEMIDE 40 MG/1
40 TABLET ORAL DAILY
Status: DISCONTINUED | OUTPATIENT
Start: 2024-11-20 | End: 2024-11-25 | Stop reason: HOSPADM

## 2024-11-19 RX ORDER — PANTOPRAZOLE SODIUM 20 MG/1
20 TABLET, DELAYED RELEASE ORAL DAILY
Status: DISCONTINUED | OUTPATIENT
Start: 2024-11-20 | End: 2024-11-25 | Stop reason: HOSPADM

## 2024-11-19 RX ORDER — SODIUM CHLORIDE 0.9 % (FLUSH) 0.9 %
10 SYRINGE (ML) INJECTION PRN
Status: DISCONTINUED | OUTPATIENT
Start: 2024-11-19 | End: 2024-11-25 | Stop reason: HOSPADM

## 2024-11-19 RX ORDER — ONDANSETRON 4 MG/1
4 TABLET, ORALLY DISINTEGRATING ORAL EVERY 8 HOURS PRN
Status: DISCONTINUED | OUTPATIENT
Start: 2024-11-19 | End: 2024-11-25 | Stop reason: HOSPADM

## 2024-11-19 RX ORDER — CARBIDOPA, LEVODOPA AND ENTACAPONE 50; 200; 200 MG/1; MG/1; MG/1
1 TABLET, FILM COATED ORAL 3 TIMES DAILY
Status: DISCONTINUED | OUTPATIENT
Start: 2024-11-19 | End: 2024-11-19

## 2024-11-19 RX ORDER — ALLOPURINOL 100 MG/1
100 TABLET ORAL DAILY
Status: DISCONTINUED | OUTPATIENT
Start: 2024-11-20 | End: 2024-11-25 | Stop reason: HOSPADM

## 2024-11-19 RX ORDER — AMITRIPTYLINE HYDROCHLORIDE 10 MG/1
10 TABLET ORAL NIGHTLY
Status: DISCONTINUED | OUTPATIENT
Start: 2024-11-19 | End: 2024-11-25 | Stop reason: HOSPADM

## 2024-11-19 RX ORDER — SUCRALFATE 1 G/1
1 TABLET ORAL
Status: DISCONTINUED | OUTPATIENT
Start: 2024-11-19 | End: 2024-11-25 | Stop reason: HOSPADM

## 2024-11-19 RX ORDER — LATANOPROST 50 UG/ML
1 SOLUTION/ DROPS OPHTHALMIC NIGHTLY
Status: DISCONTINUED | OUTPATIENT
Start: 2024-11-19 | End: 2024-11-25 | Stop reason: HOSPADM

## 2024-11-19 RX ORDER — ACETAMINOPHEN 650 MG/1
650 SUPPOSITORY RECTAL EVERY 6 HOURS PRN
Status: DISCONTINUED | OUTPATIENT
Start: 2024-11-19 | End: 2024-11-20

## 2024-11-19 RX ORDER — POTASSIUM CHLORIDE 7.45 MG/ML
10 INJECTION INTRAVENOUS PRN
Status: DISCONTINUED | OUTPATIENT
Start: 2024-11-19 | End: 2024-11-25 | Stop reason: HOSPADM

## 2024-11-19 RX ORDER — MAGNESIUM SULFATE 1 G/100ML
1000 INJECTION INTRAVENOUS PRN
Status: DISCONTINUED | OUTPATIENT
Start: 2024-11-19 | End: 2024-11-25 | Stop reason: HOSPADM

## 2024-11-19 RX ORDER — FLUTICASONE PROPIONATE 50 MCG
2 SPRAY, SUSPENSION (ML) NASAL DAILY
Status: DISCONTINUED | OUTPATIENT
Start: 2024-11-20 | End: 2024-11-25 | Stop reason: HOSPADM

## 2024-11-19 RX ORDER — ENTACAPONE 200 MG/1
200 TABLET ORAL 3 TIMES DAILY
Status: DISCONTINUED | OUTPATIENT
Start: 2024-11-19 | End: 2024-11-19

## 2024-11-19 RX ORDER — ROPINIROLE 0.5 MG/1
0.5 TABLET, FILM COATED ORAL 3 TIMES DAILY
Status: DISCONTINUED | OUTPATIENT
Start: 2024-11-19 | End: 2024-11-25 | Stop reason: HOSPADM

## 2024-11-19 RX ORDER — GABAPENTIN 100 MG/1
100 CAPSULE ORAL DAILY
Status: DISCONTINUED | OUTPATIENT
Start: 2024-11-20 | End: 2024-11-25 | Stop reason: HOSPADM

## 2024-11-19 RX ORDER — ISOSORBIDE MONONITRATE 30 MG/1
30 TABLET, EXTENDED RELEASE ORAL DAILY
Status: DISCONTINUED | OUTPATIENT
Start: 2024-11-20 | End: 2024-11-25 | Stop reason: HOSPADM

## 2024-11-19 RX ORDER — TAMSULOSIN HYDROCHLORIDE 0.4 MG/1
0.4 CAPSULE ORAL DAILY
Status: DISCONTINUED | OUTPATIENT
Start: 2024-11-20 | End: 2024-11-25 | Stop reason: HOSPADM

## 2024-11-19 RX ORDER — CARBIDOPA AND LEVODOPA 25; 100 MG/1; MG/1
2 TABLET ORAL 3 TIMES DAILY
Status: DISCONTINUED | OUTPATIENT
Start: 2024-11-19 | End: 2024-11-25 | Stop reason: HOSPADM

## 2024-11-19 RX ORDER — CLOPIDOGREL BISULFATE 75 MG/1
75 TABLET ORAL DAILY
Status: DISCONTINUED | OUTPATIENT
Start: 2024-11-20 | End: 2024-11-25 | Stop reason: HOSPADM

## 2024-11-19 RX ORDER — ONDANSETRON 2 MG/ML
4 INJECTION INTRAMUSCULAR; INTRAVENOUS EVERY 6 HOURS PRN
Status: DISCONTINUED | OUTPATIENT
Start: 2024-11-19 | End: 2024-11-25 | Stop reason: HOSPADM

## 2024-11-19 RX ADMIN — HEPARIN SODIUM 5000 UNITS: 5000 INJECTION INTRAVENOUS; SUBCUTANEOUS at 22:55

## 2024-11-19 RX ADMIN — LATANOPROST 1 DROP: 50 SOLUTION OPHTHALMIC at 22:56

## 2024-11-19 RX ADMIN — SODIUM CHLORIDE, PRESERVATIVE FREE 5 ML: 5 INJECTION INTRAVENOUS at 22:55

## 2024-11-19 ASSESSMENT — PAIN SCALES - GENERAL: PAINLEVEL_OUTOF10: 0

## 2024-11-19 ASSESSMENT — HEART SCORE
ECG: NON-SPECIFC REPOLARIZATION DISTURBANCE/LBTB/PM
ECG: NON-SPECIFC REPOLARIZATION DISTURBANCE/LBTB/PM
ECG: NORMAL

## 2024-11-19 ASSESSMENT — LIFESTYLE VARIABLES
HOW MANY STANDARD DRINKS CONTAINING ALCOHOL DO YOU HAVE ON A TYPICAL DAY: PATIENT DOES NOT DRINK
HOW OFTEN DO YOU HAVE A DRINK CONTAINING ALCOHOL: NEVER

## 2024-11-19 NOTE — ED TRIAGE NOTES
Pt presents to ED room 25 via wheelchair from triage c/o generalized weakness that has been ongoing for a couple days. Pt reports that he also has been experiencing midsternal chest pain.

## 2024-11-19 NOTE — ED PROVIDER NOTES
[facility] OBSERVATION UNIT  Emergency Department  Emergency Medicine Resident Sign-out     Care of Mina Gill was assumed from Dr. Ordonez and is being seen for Extremity Weakness  .  The patient's initial evaluation and plan have been discussed with the prior provider who initially evaluated the patient.     EMERGENCY DEPARTMENT COURSE / MEDICAL DECISION MAKING:       MEDICATIONS GIVEN:  Orders Placed This Encounter   Medications    clopidogrel (PLAVIX) tablet 75 mg    allopurinol (ZYLOPRIM) tablet 100 mg    tamsulosin (FLOMAX) capsule 0.4 mg    pantoprazole (PROTONIX) tablet 20 mg    gabapentin (NEURONTIN) capsule 100 mg    rOPINIRole (REQUIP) tablet 0.5 mg    sucralfate (CARAFATE) tablet 1 g    atorvastatin (LIPITOR) tablet 40 mg    latanoprost (XALATAN) 0.005 % ophthalmic solution 1 drop    furosemide (LASIX) tablet 40 mg    amLODIPine (NORVASC) tablet 5 mg    cetirizine (ZYRTEC) tablet 5 mg    fluticasone (FLONASE) 50 MCG/ACT nasal spray 2 spray     Pharmacists should automatically interchange the frequency from \"daily\" or \"QHS\" to \"Daily PRN\" when Flonase is ordered from a patient's home medication list. If it is not ordered from the home medication list, then do not change the ordered frequency.    magnesium oxide (MAG-OX) tablet 400 mg    DISCONTD: carbidopa-levodopa-entacapone (STALEVO 200) -200 MG per tablet 1 tablet     Order Specific Question:   Please select a reason the therapeutic interchange was not accepted:     Answer:   Okay for Pharmacy to Substitute with Components    Ubrogepant TABS 50 mg (Patient Supplied)    amitriptyline (ELAVIL) tablet 10 mg    DISCONTD: entacapone (COMTAN) tablet 200 mg    isosorbide mononitrate (IMDUR) extended release tablet 30 mg    sodium chloride flush 0.9 % injection 5-40 mL    sodium chloride flush 0.9 % injection 10 mL    0.9 % sodium chloride infusion    OR Linked Order Group     potassium chloride (KLOR-CON M) extended release tablet 40 mEq           ED Course User Index  [AS] Glen Bennett DO  [MO] Annamaria Ordonez MD       OUTSTANDING TASKS / RECOMMENDATIONS:    Follow-up on urinalysis.  Likely discharge     FINAL IMPRESSION:     1. Generalized weakness        DISPOSITION:         DISPOSITION:  []  Discharge   []  Transfer -    [x]  Admission -     []  Against Medical Advice   []  Eloped   FOLLOW-UP: No follow-up provider specified.   DISCHARGE MEDICATIONS: Current Discharge Medication List             Glen Bennett DO  Emergency Medicine Resident  [facility]

## 2024-11-19 NOTE — ED PROVIDER NOTES
[facility]     Emergency Department     Faculty Attestation  2:56 PM EST      I performed a history and physical examination of the patient and discussed management with the resident. I have reviewed and agree with the resident’s findings including all diagnostic interpretations, and treatment plans as written. Any areas of disagreement are noted on the chart. I was personally present for the key portions of any procedures. I have documented in the chart those procedures where I was not present during the key portions. I have reviewed the emergency nurses triage note. I agree with the chief complaint, past medical history, past surgical history, allergies, medications, social and family history as documented unless otherwise noted below. Documentation of the HPI, Physical Exam and Medical Decision Making performed by scribes is based on my personal performance of the HPI, PE and MDM. For Physician Assistant/ Nurse Practitioner cases/documentation I have personally evaluated this patient and have completed at least one if not all key elements of the E/M (history, physical exam, and MDM). Additional findings are as noted.    Primary Care Physician: Tisha Glasgow MD    History: This is a 84 y.o. male who presents to the Emergency Department with complaint of generalized weakness for the pas few days, h/o parkinsons' h/o heart block with medtronic device placed in august of this year.  No fevers or chils, ambulates with cane at baseline, has friend with him whom he is living with at this time.no chest pain or shortness of breath    On exam her has persistent tremoring that is his baseline  Lungs clear, moving all extremities equally  Soft abdomen non tender    Weakness, will interrogate pacemaker,  Check labs, cardiac labs, and also check UA    EKG shows no stemi          Shanon Albright D.O, M.P.H  Attending Emergency Medicine Physician         Jose Ramon,

## 2024-11-19 NOTE — ED PROVIDER NOTES
[facility] ED  Emergency Department Encounter  Emergency Medicine Resident     Pt Name:Mina Gill  MRN: [id number]  Birthdate 1940  Date of evaluation: 11/19/24  PCP:  Tisha Glasgow MD  Note Started: 3:45 PM EST      CHIEF COMPLAINT       Chief Complaint   Patient presents with    Extremity Weakness       HISTORY OF PRESENT ILLNESS  (Location/Symptom, Timing/Onset, Context/Setting, Quality, Duration, Modifying Factors, Severity.)      Mina Gill is a 84 y.o. male who presents with generalized weakness for the past few days.  Patient does have a pacemaker in place.  He does not currently have chest pain or shortness of breath, but states that sometimes he does feel short of breath and does intermittently have chest pain.  He also has been feeling nauseous, but has not vomited.  He denies any dizziness or lightheadedness, vision changes, numbness or tingling in his arms or legs.  Patient has been taking all of his medications as prescribed.  He presents with his caregiver at the bedside who supports this story.  He has not fallen recently.    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     has a past surgical history that includes Colonoscopy (11/02/2012); shoulder surgery (Right); pr colsc flx w/rmvl of tumor polyp lesion snare tq (N/A, 09/19/2017); Colonoscopy (09/19/2017); Cholecystectomy, laparoscopic (N/A, 10/29/2022);

## 2024-11-19 NOTE — ED PROVIDER NOTES
Handoff taken on the following patient from prior Attending Physician:    Pt Name: Mina Gill    PCP:  Tisha Glasgow MD         Attestation    I was available and discussed any additional care issues that arose and coordinated the management plans with the resident(s) caring for the patient during my duty period. Any areas of disagreement with resident’s documentation of care or procedures are noted on the chart. I was personally present for the key portions of any/all procedures during my duty period. I have documented in the chart those procedures where I was not present during the key portions.     Patient 84-year-old with extremity weakness just generalized all over all extremities no focal deficits patient has pacer in place since August currently awaiting interrogation cardiac evaluation risk ratification and UA     Jude Fish DO  11/19/24 1510

## 2024-11-20 ENCOUNTER — CARE COORDINATION (OUTPATIENT)
Dept: CARE COORDINATION | Age: 84
End: 2024-11-20

## 2024-11-20 ENCOUNTER — APPOINTMENT (OUTPATIENT)
Dept: NUCLEAR MEDICINE | Age: 84
End: 2024-11-20
Payer: MEDICARE

## 2024-11-20 LAB
ANION GAP SERPL CALCULATED.3IONS-SCNC: 11 MMOL/L (ref 9–16)
BNP SERPL-MCNC: 372 PG/ML (ref 0–450)
BUN SERPL-MCNC: 22 MG/DL (ref 8–23)
CALCIUM SERPL-MCNC: 9.4 MG/DL (ref 8.6–10.4)
CHLORIDE SERPL-SCNC: 102 MMOL/L (ref 98–107)
CO2 SERPL-SCNC: 23 MMOL/L (ref 20–31)
CREAT SERPL-MCNC: 1.3 MG/DL (ref 0.7–1.2)
D DIMER PPP FEU-MCNC: 4.28 UG/ML FEU (ref 0–0.57)
GFR, ESTIMATED: 54 ML/MIN/1.73M2
GLUCOSE SERPL-MCNC: 119 MG/DL (ref 74–99)
INR PPP: 1
POTASSIUM SERPL-SCNC: 4.4 MMOL/L (ref 3.7–5.3)
PROTHROMBIN TIME: 12.7 SEC (ref 11.7–14.9)
SARS-COV-2 RDRP RESP QL NAA+PROBE: NOT DETECTED
SODIUM SERPL-SCNC: 136 MMOL/L (ref 136–145)
SPECIMEN DESCRIPTION: NORMAL

## 2024-11-20 PROCEDURE — 87635 SARS-COV-2 COVID-19 AMP PRB: CPT

## 2024-11-20 PROCEDURE — 96360 HYDRATION IV INFUSION INIT: CPT

## 2024-11-20 PROCEDURE — 99222 1ST HOSP IP/OBS MODERATE 55: CPT | Performed by: PSYCHIATRY & NEUROLOGY

## 2024-11-20 PROCEDURE — 80048 BASIC METABOLIC PNL TOTAL CA: CPT

## 2024-11-20 PROCEDURE — 6370000000 HC RX 637 (ALT 250 FOR IP): Performed by: NURSE PRACTITIONER

## 2024-11-20 PROCEDURE — 85379 FIBRIN DEGRADATION QUANT: CPT

## 2024-11-20 PROCEDURE — 97530 THERAPEUTIC ACTIVITIES: CPT

## 2024-11-20 PROCEDURE — G0378 HOSPITAL OBSERVATION PER HR: HCPCS

## 2024-11-20 PROCEDURE — 85610 PROTHROMBIN TIME: CPT

## 2024-11-20 PROCEDURE — 97162 PT EVAL MOD COMPLEX 30 MIN: CPT

## 2024-11-20 PROCEDURE — 99222 1ST HOSP IP/OBS MODERATE 55: CPT | Performed by: INTERNAL MEDICINE

## 2024-11-20 PROCEDURE — 6360000002 HC RX W HCPCS: Performed by: NURSE PRACTITIONER

## 2024-11-20 PROCEDURE — 6370000000 HC RX 637 (ALT 250 FOR IP)

## 2024-11-20 PROCEDURE — 96361 HYDRATE IV INFUSION ADD-ON: CPT

## 2024-11-20 PROCEDURE — 78582 LUNG VENTILAT&PERFUS IMAGING: CPT

## 2024-11-20 PROCEDURE — 3430000000 HC RX DIAGNOSTIC RADIOPHARMACEUTICAL

## 2024-11-20 PROCEDURE — 2580000003 HC RX 258: Performed by: STUDENT IN AN ORGANIZED HEALTH CARE EDUCATION/TRAINING PROGRAM

## 2024-11-20 PROCEDURE — 97116 GAIT TRAINING THERAPY: CPT

## 2024-11-20 PROCEDURE — A9539 TC99M PENTETATE: HCPCS

## 2024-11-20 PROCEDURE — 92610 EVALUATE SWALLOWING FUNCTION: CPT

## 2024-11-20 PROCEDURE — 2T015 HOSPITALIST 2ND TOUCH: CPT | Performed by: STUDENT IN AN ORGANIZED HEALTH CARE EDUCATION/TRAINING PROGRAM

## 2024-11-20 PROCEDURE — 96372 THER/PROPH/DIAG INJ SC/IM: CPT

## 2024-11-20 PROCEDURE — 99223 1ST HOSP IP/OBS HIGH 75: CPT | Performed by: STUDENT IN AN ORGANIZED HEALTH CARE EDUCATION/TRAINING PROGRAM

## 2024-11-20 PROCEDURE — 36415 COLL VENOUS BLD VENIPUNCTURE: CPT

## 2024-11-20 PROCEDURE — A9540 TC99M MAA: HCPCS

## 2024-11-20 PROCEDURE — 2580000003 HC RX 258: Performed by: NURSE PRACTITIONER

## 2024-11-20 RX ORDER — ACETAMINOPHEN 650 MG/1
650 SUPPOSITORY RECTAL EVERY 6 HOURS PRN
Status: DISCONTINUED | OUTPATIENT
Start: 2024-11-20 | End: 2024-11-25 | Stop reason: HOSPADM

## 2024-11-20 RX ORDER — ACETAMINOPHEN 325 MG/1
650 TABLET ORAL EVERY 6 HOURS PRN
Status: DISCONTINUED | OUTPATIENT
Start: 2024-11-20 | End: 2024-11-25 | Stop reason: HOSPADM

## 2024-11-20 RX ORDER — SODIUM CHLORIDE 9 MG/ML
INJECTION, SOLUTION INTRAVENOUS CONTINUOUS
Status: DISCONTINUED | OUTPATIENT
Start: 2024-11-20 | End: 2024-11-25 | Stop reason: HOSPADM

## 2024-11-20 RX ORDER — KIT FOR THE PREPARATION OF TECHNETIUM TC 99M PENTETATE 20 MG/1
45 INJECTION, POWDER, LYOPHILIZED, FOR SOLUTION INTRAVENOUS; RESPIRATORY (INHALATION)
Status: COMPLETED | OUTPATIENT
Start: 2024-11-20 | End: 2024-11-20

## 2024-11-20 RX ADMIN — KIT FOR THE PREPARATION OF TECHNETIUM TC 99M PENTETATE 45 MILLICURIE: 20 INJECTION, POWDER, LYOPHILIZED, FOR SOLUTION INTRAVENOUS; RESPIRATORY (INHALATION) at 10:15

## 2024-11-20 RX ADMIN — SODIUM CHLORIDE, PRESERVATIVE FREE 10 ML: 5 INJECTION INTRAVENOUS at 15:28

## 2024-11-20 RX ADMIN — HEPARIN SODIUM 5000 UNITS: 5000 INJECTION INTRAVENOUS; SUBCUTANEOUS at 21:02

## 2024-11-20 RX ADMIN — LATANOPROST 1 DROP: 50 SOLUTION OPHTHALMIC at 21:02

## 2024-11-20 RX ADMIN — AMITRIPTYLINE HYDROCHLORIDE 10 MG: 10 TABLET, FILM COATED ORAL at 21:01

## 2024-11-20 RX ADMIN — ATORVASTATIN CALCIUM 40 MG: 40 TABLET, FILM COATED ORAL at 21:02

## 2024-11-20 RX ADMIN — HEPARIN SODIUM 5000 UNITS: 5000 INJECTION INTRAVENOUS; SUBCUTANEOUS at 05:54

## 2024-11-20 RX ADMIN — ROPINIROLE 0.5 MG: 0.5 TABLET, FILM COATED ORAL at 21:01

## 2024-11-20 RX ADMIN — ACETAMINOPHEN 650 MG: 325 TABLET ORAL at 21:02

## 2024-11-20 RX ADMIN — HEPARIN SODIUM 5000 UNITS: 5000 INJECTION INTRAVENOUS; SUBCUTANEOUS at 15:29

## 2024-11-20 RX ADMIN — SODIUM CHLORIDE: 9 INJECTION, SOLUTION INTRAVENOUS at 15:35

## 2024-11-20 RX ADMIN — ACETAMINOPHEN 650 MG: 650 SUPPOSITORY RECTAL at 04:55

## 2024-11-20 RX ADMIN — SUCRALFATE 1 G: 1 TABLET ORAL at 21:02

## 2024-11-20 RX ADMIN — CARBIDOPA AND LEVODOPA 2 TABLET: 25; 100 TABLET ORAL at 21:01

## 2024-11-20 RX ADMIN — Medication 6 MILLICURIE: at 10:45

## 2024-11-20 RX ADMIN — ENTACAPONE 200 MG: 200 TABLET, FILM COATED ORAL at 21:01

## 2024-11-20 ASSESSMENT — PAIN DESCRIPTION - LOCATION
LOCATION: FOOT
LOCATION: HEAD
LOCATION: OTHER (COMMENT)

## 2024-11-20 ASSESSMENT — PAIN DESCRIPTION - ONSET: ONSET: ON-GOING

## 2024-11-20 ASSESSMENT — PAIN SCALES - GENERAL
PAINLEVEL_OUTOF10: 3
PAINLEVEL_OUTOF10: 4
PAINLEVEL_OUTOF10: 5
PAINLEVEL_OUTOF10: 0
PAINLEVEL_OUTOF10: 4

## 2024-11-20 ASSESSMENT — PAIN - FUNCTIONAL ASSESSMENT: PAIN_FUNCTIONAL_ASSESSMENT: ACTIVITIES ARE NOT PREVENTED

## 2024-11-20 ASSESSMENT — PAIN DESCRIPTION - ORIENTATION
ORIENTATION: RIGHT
ORIENTATION: LEFT;RIGHT;MID

## 2024-11-20 ASSESSMENT — PAIN DESCRIPTION - DESCRIPTORS: DESCRIPTORS: PRESSURE

## 2024-11-20 ASSESSMENT — PAIN DESCRIPTION - FREQUENCY: FREQUENCY: INTERMITTENT

## 2024-11-20 ASSESSMENT — PAIN DESCRIPTION - PAIN TYPE: TYPE: CHRONIC PAIN

## 2024-11-20 NOTE — CONSULTS
Attestation signed by    [facility]   Admission Note                 Patient's name:  Mina Gill  Medical Record Number: [id number]  Patient's account/billing number: [id number]  Patient's YOB: 1940  Age: 84 y.o.  Date of Admission: 11/19/2024  1:57 PM  Date of History and Physical Examination: 11/20/2024  Primary Care Physician: Tisha Glasgow MD    Code Status: Full Code    CHIEF COMPLAINT:  SOB    CONSULT REASON: Chest pain    HISTORY OF PRESENT ILLNESS:      History was obtained from chart review and the patient.      Mina Gill is a 84 y.o. M with pmhx of CAD s/p PCI 2018, complete heart block  s/p pacemaker, CKD, COPD, parkinson disease present to the ED for generalized weakness.     He reports that he had chest pain last time, he flet like his pacemaker \"shocked\" him couple of times, he still endorses left sided chest pain which is tender to palpation. CXR negative fr any acute process. Troponin 26 --> 25. He also reports intermittent SOB.     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    Past Surgical History:     has a past surgical history that includes Colonoscopy (11/02/2012); shoulder surgery (Right); pr colsc flx w/rmvl of tumor polyp lesion snare tq (N/A, 09/19/2017); Colonoscopy (09/19/2017); Cholecystectomy, laparoscopic (N/A, 10/29/2022); Esophagogastroduodenoscopy (01/09/2023); Upper  · Baseline creatinine appears to be between 1 4 and 1 6    Questionable new higher baseline catheterization from 4/24/24   HTN  CKD  COPD    RECOMMENDATIONS:  Pacemaker interrogation reviewed which is normal  Pain likely non-cardiac, reproducible on palpation  No further cardiac work-up  Labs, ECG, prior notes, telemetry,  MAR reviewed   Additional recommendations pending clinical course     Rafael Knapp MD  Internal Medicine Resident, PGY-2  [facility]              Attending Physician Statement  I have discussed the case of Mina Gill including pertinent history and exam findings with the student/resident/fellow. I have seen and examined the patient and the key elements of the encounter have been performed by me. I agree with the assessment, plan and orders as documented by the resident With changes made to the note.     Noncardiac chest pain, patient had a recent heart catheterization which was patent stents without any significant disease, device interrogation is normal.  Electronically signed by Arabella Nagy MD on 11/20/2024 at 1:57 PM.    [facility]      [phone]

## 2024-11-20 NOTE — PROGRESS NOTES
Occupational Therapy    [facility]  Occupational Therapy Not Seen Note    DATE: 2024    NAME: Mina Gill  MRN: [id number]   : 1940      Patient not seen this date for Occupational Therapy due to:    Testing: At VQ scan upon arrival.    Next Scheduled Treatment: Attempt in pm as appropriate.    Electronically signed by Antonio Gibson OT on 2024 at 11:38 AM

## 2024-11-20 NOTE — PROGRESS NOTES
[facility]  Office: [phone]  Greg Ding DO, Walt Allen DO, Arnaldo Beltre DO, Rikki White DO, Julisa Shafer MD, Lesli Altamirano MD, Marzena Berry MD, Sandrine Potter MD,  Jd Mcpherson MD, Adolph Blount MD, Jigar Frankel MD,  Kathleen Ventura DO, Akash Brown MD, Nj Tidewll MD, Trevin Ding DO, Radha Mauro MD,  Scott Paulino DO, Danitza Borrero MD, Claritza Morales MD, Ella Powell MD, Adrian Travis MD,  Stoney Flannery MD, Yamel Escobar MD, Josué Mayen MD, Vic Crenshaw MD, Marquise Vila MD, Stephanie Bonds MD, Delvin Chatman DO, Son Singh MD, Shirley Waterhouse, CNP,  Kathie Bennett CNP, Delvin Anguiano, CNP,  Noemy Hogan, RON, Ana Rosa Kenny, CNP, Regina Rocha, CNP, Waleska Sierra, CNP, Rosalinda Champion, CNP, Geeta Mayo PA-C, Sharon Arenas PA-C, Carline Thompson, CNP, Rosa Craig, CNP,  America Sandoval, CNP, Juany Floyd, CNP,  Chelsea Magaña, CNP, Cristina Hammond, CNP         [facility]   IN-PATIENT SERVICE   [facility]    Second Visit Note  For more detailed information please refer to the progress note of the day      11/20/2024    4:12 PM    Name:   Mina Gill  MRN:     [id number]     Acct:      [id number]   Room:   OBS 13/13-1   Day:  0  Admit Date:  11/19/2024  1:57 PM    PCP:   Tisha Glasgow MD  Code Status:  Full Code      Pt vitals were reviewed   New labs were reviewed   Patient was seen.  He is still feels weak.  States that he had shock from the pacemaker, explained that his pacemaker cannot give a shock as he does not have AICD.  He then states that he lives with his friend.  Seems like patient has more social issues.  Advised him that cardio and neurology have already cleared him.    There is concern that patient had cough with swallowing, swallow studies pending.    Updated plan :     Keep n.p.o. till repeat swallow study  PT and OT evaluation  Patient is interested in placement if he

## 2024-11-20 NOTE — PROGRESS NOTES
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  Past Surgical History:  has a past surgical history that includes Colonoscopy (2012); shoulder surgery (Right); pr colsc flx w/rmvl of tumor polyp lesion snare tq (N/A, 2017); Colonoscopy (2017); Cholecystectomy, laparoscopic (N/A, 10/29/2022); Esophagogastroduodenoscopy (2023); Upper gastrointestinal endoscopy (N/A, 2023); Upper gastrointestinal endoscopy (N/A, 2023); Cardiac procedure (N/A, 2024); Cardiac procedure (N/A, 8/3/2024); and ep device procedure (N/A, 2024).    Assessment  Body Structures, Functions, Activity Limitations Requiring Skilled Therapeutic Intervention: Decreased functional mobility , Decreased strength, Decreased safe awareness, Decreased cognition, Decreased endurance, Decreased balance, Decreased posture  Assessment: Pt with impaired mobility requiring min to mod A for each aspect of mobility. Pt with significant unsteadiness

## 2024-11-20 NOTE — PROGRESS NOTES
Patient arrived from ED and complained of difficulty swallowing his food, pills, and his own saliva. Patient made NPO and Rosalinda Champion NP notified. NPO ordered and speech evaluation placed.

## 2024-11-20 NOTE — PROGRESS NOTES
SPEECH LANGUAGE PATHOLOGY  Facility/Department: [facility] OBSERVATION UNIT   CLINICAL BEDSIDE SWALLOW EVALUATION    NAME: Mina Gill  : 1940  MRN: [id number]    ADMISSION DATE: 2024  ADMITTING DIAGNOSIS: has Temporary loss of eyesight; Syncope : posterior circulation stroke vs Neurocardiogenic syncope ; Intracranial bleed : traumatic left sub-dural hematoma ,managed conservatively in ; Headache; CKD (chronic kidney disease) stage 3, GFR 30-59 ml/min ([facility]); Depression; Hyperlipidemia; Microhematuria; Microalbuminuria; Essential hypertension; Generalized abdominal pain; Tubular adenoma of colon; Diverticulosis of colon; History of skull fracture; Nausea; Pure hypercholesterolemia; Prediabetes; Parkinson disease ([facility]); Chronic post-traumatic headache, not intractable; Fall (on) (from) other stairs and steps, initial encounter; Strain of iliopsoas muscle, initial encounter; Chronic pain of left knee; Closed fracture of second toe of right foot; Closed fracture of second toe of left foot; Abnormal ECG; Cerebrovascular accident ([facility]); Chest pain, unspecified; Hematoma of scalp; Left ventricular hypertrophy; Mild aortic valve regurgitation; Pulmonary hypertension, mild ([facility]); Lumbar radiculopathy; Dizziness; Hyponatremia; Vomiting; General weakness; Overweight (BMI 25.0-29.9); Frequent falls; Unspecified inflammatory spondylopathy, 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[facility]); Stage 3b chronic kidney disease (CKD) ([facility]); Chronic pain of multiple joints; Multiple comorbid conditions; Gout; Nerve pain; NSTEMI (non-ST elevated myocardial infarction) ([facility]); History of subdural hematoma; Coronary artery disease involving native heart with angina pectoris ([facility]); Lumbar facet arthropathy; Spinal stenosis of lumbar region without neurogenic claudication; Generalized weakness; Lumbosacral spondylosis without myelopathy; Gallstone pancreatitis; Calculus of gallbladder with acute cholecystitis without obstruction; Bandemia; Pulmonary nodule; MRSA

## 2024-11-20 NOTE — ED NOTES
ED to inpatient nurses report      Chief Complaint:  Chief Complaint   Patient presents with    Extremity Weakness     Present to ED from: Home via triage    MOA:     LOC: alert and orientated to name, place, date  Mobility: Independent  Oxygen Baseline: Room air    Current needs required: None   Pending ED orders: None  Present condition: Stable    Why did the patient come to the ED? Pt presents to ED room 25 via wheelchair from triage c/o generalized weakness that has been ongoing for a couple days. Pt reports that he also has been experiencing midsternal chest pain.   What is the plan? Admission  Any procedures or intervention occur? CT, line, labs  Any safety concerns?? None    Mental Status:       Psych Assessment:   Psychosocial  Psychosocial (WDL): Within Defined Limits  Vital signs   Vitals:    11/19/24 1409 11/19/24 1410 11/19/24 1629 11/19/24 2014   BP:       Pulse:  71 70    Resp:  18 16    Temp: 98.2 °F (36.8 °C)      TempSrc: Oral      SpO2:  98% 96%    Weight:    80.7 kg (178 lb)   Height:    1.702 m (5' 7\")        Vitals:  Patient Vitals for the past 24 hrs:   BP Temp Temp src Pulse Resp SpO2 Height Weight   11/19/24 2014 -- -- -- -- -- -- 1.702 m (5' 7\") 80.7 kg (178 lb)   11/19/24 1629 -- -- -- 70 16 96 % -- --   11/19/24 1410 -- -- -- 71 18 98 % -- --   11/19/24 1409 -- 98.2 °F (36.8 °C) Oral -- -- -- -- --   11/19/24 1408 -- -- -- 73 21 99 % -- --   11/19/24 1403 -- -- -- 73 16 -- -- --   11/19/24 1402 (!) 173/117 -- -- -- -- -- -- --      Visit Vitals  BP (!) 173/117   Pulse 70   Temp 98.2 °F (36.8 °C) (Oral)   Resp 16   Ht 1.702 m (5' 7\")   Wt 80.7 kg (178 lb)   SpO2 96%   BMI 27.88 kg/m²        LDAs:   Peripheral IV 11/19/24 Right Antecubital (Active)   Site Assessment Clean, dry & intact 11/19/24 1411       Ambulatory Status:  Presents to emergency department  because of falls (Syncope, seizure, or loss of consciousness): No, Age > 70: Yes, Altered Mental Status, Intoxication with alcohol or

## 2024-11-20 NOTE — H&P
[facility]  Office: [phone]  Greg Ding DO, Walt Allen DO, Arnaldo Beltre DO, Rikki White DO, Julisa Shafer MD, Lesli Altamirano MD, Marzena Berry MD, Sandrine Potter MD,  Jd Mcpherson MD, Adolph Blount MD, Rodney Pang DO, Jigar Frankel MD,  Kathleen Ventura DO, Akash Brown MD, Nj Tidwell MD, Trevin Ding DO, Radha Mauro MD, Son Singh MD, Scott Paulino DO, Danitza Borrero MD, Claritza Morales MD, Ella Powell MD, Adrian Travis MD,  Jj Barron DO, Julio Joiner MD,  Shirley Waterhouse, CNP,  Kathie Bennett, CNP, Juany Floyd, CNP, Delvin Anguiano, CNP,  Noemy Hogan, RON, Ana Rosa Kenny, CNP, Regina Rocha, CNP, Rose Costello, CNP, Waleska Sierra, CNP, Rosalinda Champion, CNP, RANDA Messina-C, Yuki Sanches, CNS, Dahlia Alexander, CNP, Chelsea Magaña, CNP         [facility]   IN-PATIENT SERVICE   [facility]    HISTORY AND PHYSICAL EXAMINATION            Date:   11/20/2024  Patient name:  Mina Gill  Date of admission:  11/19/2024  1:57 PM  MRN:   [id number]  Account:  [id number]  YOB: 1940  PCP:    Tisha Glasgow MD  Room:   OBS 13/13-1  Code Status:    Full Code    Chief Complaint:     Chief Complaint   Patient presents with    Extremity Weakness       History Obtained From:     patient    History of Present Illness:     Mina Gill is a 84 y.o. Non- / non  male who presents with Extremity Weakness   and is admitted to the hospital for the management of Weakness.    84-year-old male with past medical history of diabetes mellitus, CKD stage III, GERD, Parkinson disease, history of CAD, complete heart block status post pacemaker, COPD with 90-pack-year smoking history presented to ED due to generalized weakness.  Feeling weak generalized overall.    Patient mentioned that he was resting and he noticed shock from his pacemaker that woke him up.  After that he decided come to ED.  Currently patient  Minimal coronary artery disease.  10% stenosis in RCA.  Chest pain and shortness of breath:    Resuming Plavix 75 mg daily  Resuming Lasix 40 mg daily, Imdur 30 mg daily  Ordering BNP  Follow-up with pacemaker interrogation  Consulting cardiology  Ordering D-dimer  If D-dimer elevated will order VQ scan due to elevated creatinine.    Parkinson's: Resuming carbidopa levodopa and entacapone  Generalized weakness:    Consulting neurology to evaluate for worsening of Parkinson symptoms.    CKD stage III: Creatinine at baseline  Diabetes mellitus: Placing on insulin sliding scale  Hypertension/hyperlipidemia: Resuming home medication.  COPD/pulm hypertension: Currently not in exacerbation.  Saturating well on room air.  Denies any cough or sputum production.  Chest x-ray unremarkable.        Consultations:   IP CONSULT TO FAMILY MEDICINE     Patient is admitted as inpatient status because of co-morbidities listed above, severity of signs and symptoms as outlined, requirement for current medical therapies and most importantly because of direct risk to patient if care not provided in a hospital setting.  Expected length of stay > 48 hours.    Yamel Escobar MD  11/20/2024  12:39 AM    Copy sent to Tisha Wallace MD

## 2024-11-20 NOTE — PLAN OF CARE
Problem: Discharge Planning  Goal: Discharge to home or other facility with appropriate resources  11/20/2024 1603 by Lora Reyes GN  Outcome: Progressing  11/20/2024 0510 by Candie Florian RN  Outcome: Progressing     Problem: Skin/Tissue Integrity  Goal: Absence of new skin breakdown  Description: 1.  Monitor for areas of redness and/or skin breakdown  2.  Assess vascular access sites hourly  3.  Every 4-6 hours minimum:  Change oxygen saturation probe site  4.  Every 4-6 hours:  If on nasal continuous positive airway pressure, respiratory therapy assess nares and determine need for appliance change or resting period.  11/20/2024 1603 by Lora Reyes GN  Outcome: Progressing  11/20/2024 0510 by Candie Florian RN  Outcome: Progressing     Problem: Safety - Adult  Goal: Free from fall injury  11/20/2024 1603 by Lora Reyes GN  Outcome: Progressing  11/20/2024 0510 by Candie Florian RN  Outcome: Progressing     Problem: ABCDS Injury Assessment  Goal: Absence of physical injury  11/20/2024 1603 by Lora Reyes GN  Outcome: Progressing  11/20/2024 0510 by Candie Florian RN  Outcome: Progressing     Problem: Pain  Goal: Verbalizes/displays adequate comfort level or baseline comfort level  11/20/2024 1603 by Lora Reyes GN  Outcome: Progressing  11/20/2024 0510 by Candie Florian RN  Outcome: Progressing

## 2024-11-20 NOTE — CONSULTS
[facility]   IN-PATIENT SERVICE   [facility]    Neurology Consult Note            Date:   11/20/2024  Patient name:  Mina Gill  Date of admission:  11/19/2024  1:57 PM  MRN:   [id number]  Account:  [id number]  YOB: 1940  PCP:    Tisha Glasgow MD  Room:   OBS 13/13-1  Code Status:    Full Code    Chief Complaint:     Chief Complaint   Patient presents with    Extremity Weakness       History Obtained From:     EMR, patient    History of Present Illness:     The patient is a 84 y.o. male with history of tremor dominant Parkinson's who is currently admitted with generalized weakness, fatigue and shortness of breath.  Neurology is consulted due to history of Parkinson's disease and weakness. Caregiver not present at bedside at the time of my exam.   Patient endorses medication compliance and states his tremors are at baseline.  Has a history of dysphagia and aspiration pneumonia and recurrent worsening of symptoms due to missing his parkinson meds for a few days. Associated symptoms of dizziness at baseline 2/2 autonomic dysfunction.   On exam, mask facies is resting comfortably in bed. Normally has tremors of bilateral upper extremities and rigidity with bradykinesia, as well as tremor of the voice. Exam limited as patient just woken up from sleep due to overnight consult, responding appropriately but understandably sleepy.       PMH Parkinson's with REM sleep disorder, CKD stage III, CAD, complete heart block s/p PM, COPD, smoker 90-pack-year history, DM T2  History of migraine with aura on ubrogepeant and elavil 10 nightly at home    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) 3/18/2011    d/t being hurt on the job    CKD (chronic kidney disease) stage 2, GFR 60-89 ml/min     CKD (chronic kidney disease) stage 3, GFR 30-59 ml/min (HCC)     Diverticulosis of colon     GERD

## 2024-11-21 ENCOUNTER — APPOINTMENT (OUTPATIENT)
Dept: GENERAL RADIOLOGY | Age: 84
End: 2024-11-21
Payer: MEDICARE

## 2024-11-21 LAB
EKG ATRIAL RATE: 73 BPM
EKG P AXIS: 41 DEGREES
EKG P-R INTERVAL: 212 MS
EKG Q-T INTERVAL: 444 MS
EKG QRS DURATION: 164 MS
EKG QTC CALCULATION (BAZETT): 489 MS
EKG R AXIS: -84 DEGREES
EKG T AXIS: 92 DEGREES
EKG VENTRICULAR RATE: 73 BPM

## 2024-11-21 PROCEDURE — 97166 OT EVAL MOD COMPLEX 45 MIN: CPT

## 2024-11-21 PROCEDURE — 99232 SBSQ HOSP IP/OBS MODERATE 35: CPT | Performed by: STUDENT IN AN ORGANIZED HEALTH CARE EDUCATION/TRAINING PROGRAM

## 2024-11-21 PROCEDURE — 96372 THER/PROPH/DIAG INJ SC/IM: CPT

## 2024-11-21 PROCEDURE — G0378 HOSPITAL OBSERVATION PER HR: HCPCS

## 2024-11-21 PROCEDURE — 93010 ELECTROCARDIOGRAM REPORT: CPT | Performed by: INTERNAL MEDICINE

## 2024-11-21 PROCEDURE — 74230 X-RAY XM SWLNG FUNCJ C+: CPT

## 2024-11-21 PROCEDURE — 96361 HYDRATE IV INFUSION ADD-ON: CPT

## 2024-11-21 PROCEDURE — 97530 THERAPEUTIC ACTIVITIES: CPT

## 2024-11-21 PROCEDURE — 92611 MOTION FLUOROSCOPY/SWALLOW: CPT

## 2024-11-21 PROCEDURE — 6370000000 HC RX 637 (ALT 250 FOR IP)

## 2024-11-21 PROCEDURE — 6360000002 HC RX W HCPCS: Performed by: NURSE PRACTITIONER

## 2024-11-21 PROCEDURE — 2580000003 HC RX 258: Performed by: NURSE PRACTITIONER

## 2024-11-21 PROCEDURE — 6370000000 HC RX 637 (ALT 250 FOR IP): Performed by: NURSE PRACTITIONER

## 2024-11-21 PROCEDURE — 97535 SELF CARE MNGMENT TRAINING: CPT

## 2024-11-21 RX ADMIN — ENTACAPONE 200 MG: 200 TABLET, FILM COATED ORAL at 14:56

## 2024-11-21 RX ADMIN — CETIRIZINE HYDROCHLORIDE 5 MG: 10 TABLET, FILM COATED ORAL at 10:21

## 2024-11-21 RX ADMIN — ISOSORBIDE MONONITRATE 30 MG: 30 TABLET, EXTENDED RELEASE ORAL at 10:20

## 2024-11-21 RX ADMIN — GABAPENTIN 100 MG: 100 CAPSULE ORAL at 10:21

## 2024-11-21 RX ADMIN — ATORVASTATIN CALCIUM 40 MG: 40 TABLET, FILM COATED ORAL at 20:26

## 2024-11-21 RX ADMIN — SUCRALFATE 1 G: 1 TABLET ORAL at 20:26

## 2024-11-21 RX ADMIN — TAMSULOSIN HYDROCHLORIDE 0.4 MG: 0.4 CAPSULE ORAL at 10:20

## 2024-11-21 RX ADMIN — AMITRIPTYLINE HYDROCHLORIDE 10 MG: 10 TABLET, FILM COATED ORAL at 20:26

## 2024-11-21 RX ADMIN — FUROSEMIDE 40 MG: 40 TABLET ORAL at 10:20

## 2024-11-21 RX ADMIN — ACETAMINOPHEN 650 MG: 325 TABLET ORAL at 20:26

## 2024-11-21 RX ADMIN — LATANOPROST 1 DROP: 50 SOLUTION OPHTHALMIC at 20:26

## 2024-11-21 RX ADMIN — ROPINIROLE 0.5 MG: 0.5 TABLET, FILM COATED ORAL at 20:26

## 2024-11-21 RX ADMIN — SUCRALFATE 1 G: 1 TABLET ORAL at 10:20

## 2024-11-21 RX ADMIN — CARBIDOPA AND LEVODOPA 2 TABLET: 25; 100 TABLET ORAL at 14:56

## 2024-11-21 RX ADMIN — HEPARIN SODIUM 5000 UNITS: 5000 INJECTION INTRAVENOUS; SUBCUTANEOUS at 14:56

## 2024-11-21 RX ADMIN — CARBIDOPA AND LEVODOPA 2 TABLET: 25; 100 TABLET ORAL at 10:20

## 2024-11-21 RX ADMIN — ROPINIROLE 0.5 MG: 0.5 TABLET, FILM COATED ORAL at 10:20

## 2024-11-21 RX ADMIN — HEPARIN SODIUM 5000 UNITS: 5000 INJECTION INTRAVENOUS; SUBCUTANEOUS at 10:21

## 2024-11-21 RX ADMIN — SODIUM CHLORIDE, PRESERVATIVE FREE 10 ML: 5 INJECTION INTRAVENOUS at 10:22

## 2024-11-21 RX ADMIN — ENTACAPONE 200 MG: 200 TABLET, FILM COATED ORAL at 20:27

## 2024-11-21 RX ADMIN — AMLODIPINE BESYLATE 5 MG: 10 TABLET ORAL at 10:20

## 2024-11-21 RX ADMIN — CARBIDOPA AND LEVODOPA 2 TABLET: 25; 100 TABLET ORAL at 20:26

## 2024-11-21 RX ADMIN — ALLOPURINOL 100 MG: 100 TABLET ORAL at 10:21

## 2024-11-21 RX ADMIN — Medication 400 MG: at 10:20

## 2024-11-21 RX ADMIN — ROPINIROLE 0.5 MG: 0.5 TABLET, FILM COATED ORAL at 14:56

## 2024-11-21 RX ADMIN — HEPARIN SODIUM 5000 UNITS: 5000 INJECTION INTRAVENOUS; SUBCUTANEOUS at 20:26

## 2024-11-21 RX ADMIN — CLOPIDOGREL BISULFATE 75 MG: 75 TABLET ORAL at 10:20

## 2024-11-21 ASSESSMENT — PAIN SCALES - GENERAL: PAINLEVEL_OUTOF10: 7

## 2024-11-21 ASSESSMENT — PAIN DESCRIPTION - LOCATION: LOCATION: ANKLE

## 2024-11-21 NOTE — PROGRESS NOTES
[facility]  Office: [phone]  Greg Ding DO, Walt Allen DO, Arnaldo Beltre DO, Rikki White DO, Julisa Shafer MD, Lesli Altamirano MD, Marzena Berry MD, Sandrine Potter MD,  Jd Mcpherson MD, Adolph Blount MD, Jigar Frankel MD,  Kathleen Ventura DO, Akash Brown MD, Nj Tidwell MD, Trevin Ding DO, Radha Mauro MD,  Scott Paulino DO, Danitza Borrero MD, Claritza Morales MD, Ella Powell MD, Adrian Travis MD,  Stoney Flannery MD, Yamel Escobar MD, Josué Mayen MD, Vic Crenshaw MD, Marquise Vila MD, Stephanie Bonds MD, Delvin Chatman DO, Son Singh MD, Shirley Waterhouse, CNP,  Kathie Bennett, CNP, Delvin Anguiano, CNP,  Noemy Hogan, RON, Ana Rosa Kenny, CNP, Regina oRcha, CNP, Waleska Sierra, CNP, Rosalinda Champion, CNP, AD CokerC, AD KelleyC, Carline Thompson, CNP, Rosa Craig, CNP,  America Sandoval, CNP, Juany Floyd, CNP,  Chelsea Magaña, CNP, Cristina Hammond, CNP         [facility]   IN-PATIENT SERVICE   [facility]    Progress Note    11/21/2024    3:11 PM    Name:   Mina Gill  MRN:     [id number]     Acct:      [id number]   Room:   OBS 25/251   Day:  0  Admit Date:  11/19/2024  1:57 PM    PCP:   Tisha Glasgow MD  Code Status:  Full Code    Subjective:     C/C:   Chief Complaint   Patient presents with    Extremity Weakness     Interval History Status: improved.     S/p barium swallow 11/21   Safe swallow for Dysphagia soft and bite/sized (Dysphagia III) (IDDSI Level 6)  diet with thin liquids as evidenced by no observed aspiration noted with consistencies tested      Pt is agreeable for SNF--pending    Brief History:     84-year-old male with past medical history of diabetes mellitus, CKD stage III, GERD, Parkinson disease, history of CAD, complete heart block status post pacemaker, COPD with 90-pack-year smoking history presented to ED due to generalized weakness.  Feeling weak generalized  ml/min (HCC), Diverticulosis of colon, GERD (gastroesophageal reflux disease), History of non-ST elevation myocardial infarction (NSTEMI) 2022, Impaired renal function, MRSA (methicillin resistant staph aureus) culture positive, Osteoarthritis of knee, Parkinson disease (HCC), Proteinuria, Skull fracture, Temporary loss of eyesight, and Tubular adenoma of colon.    Social History:   reports that he has quit smoking. He has been exposed to tobacco smoke. He has never used smokeless tobacco. He reports that he does not drink alcohol and does not use drugs.     Family History:   Family History   Problem Relation Age of Onset    No Known Problems Mother     No Known Problems Father        Vitals:  /68   Pulse 71   Temp 98.2 °F (36.8 °C) (Temporal)   Resp 18   Ht 1.702 m (5' 7\")   Wt 80.7 kg (178 lb)   SpO2 98%   BMI 27.88 kg/m²   Temp (24hrs), Av.7 °F (36.5 °C), Min:97.2 °F (36.2 °C), Max:98.2 °F (36.8 °C)    No results for input(s): \"POCGLU\" in the last 72 hours.    I/O (24Hr):    Intake/Output Summary (Last 24 hours) at 2024 1511  Last data filed at 2024 0532  Gross per 24 hour   Intake --   Output 700 ml   Net -700 ml       Labs:  Hematology:  Recent Labs     24  14224  0208   WBC 7.2  --    RBC 4.83  --    HGB 15.6  --    HCT 46.1  --    MCV 95.4  --    MCH 32.3  --    MCHC 33.8  --    RDW 14.2  --      --    MPV 8.9  --    INR 0.9 1.0   DDIMER  --  4.28*     Chemistry:  Recent Labs     24  1422 24  1524 24  1446   *  --  136   K 3.8  --  4.4   CL 95*  --  102   CO2 25  --  23   GLUCOSE 207*  --  119*   BUN 24*  --  22   CREATININE 1.4*  --  1.3*   ANIONGAP 15  --  11   LABGLOM 49*  --  54*   CALCIUM 10.3  --  9.4   PROBNP  --  372  --    TROPHS 26* 25*  --      Recent Labs     24  1422   AST 35   ALT 7*   ALKPHOS 163*   BILITOT 0.5   LIPASE 20     ABG:No results found for: \"POCPH\", \"PHART\", \"PH\", \"POCPCO2\", \"JMF7EVL\", \"PCO2\",

## 2024-11-21 NOTE — PLAN OF CARE
Problem: Discharge Planning  Goal: Discharge to home or other facility with appropriate resources  11/21/2024 0533 by Anthony Son RN  Outcome: Progressing     Problem: Skin/Tissue Integrity  Goal: Absence of new skin breakdown  Description: 1.  Monitor for areas of redness and/or skin breakdown  2.  Assess vascular access sites hourly  3.  Every 4-6 hours minimum:  Change oxygen saturation probe site  4.  Every 4-6 hours:  If on nasal continuous positive airway pressure, respiratory therapy assess nares and determine need for appliance change or resting period.  11/21/2024 0533 by Anthony Son RN  Outcome: Progressing     Problem: Safety - Adult  Goal: Free from fall injury  11/21/2024 0533 by Anthony Son RN  Outcome: Progressing     Problem: ABCDS Injury Assessment  Goal: Absence of physical injury  11/21/2024 0533 by Anthony Son RN  Outcome: Progressing     Problem: Pain  Goal: Verbalizes/displays adequate comfort level or baseline comfort level  11/21/2024 0533 by Anthony Son RN  Outcome: Progressing

## 2024-11-21 NOTE — PROGRESS NOTES
Occupational Therapy    Occupational Therapy Initial Evaluation  Facility/Department: [facility] OBSERVATION UNIT   Patient Name: Mina Gill        MRN: [id number]    : 1940    Date of Service: 2024    Discharge Recommendations  Discharge Recommendations: Patient would benefit from continued therapy after discharge  OT Equipment Recommendations  Equipment Needed: No    Chief Complaint   Patient presents with    Extremity Weakness     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 (HCC), CKD (chronic kidney disease) stage 2, GFR 60-89 ml/min, CKD (chronic kidney disease) stage 3, GFR 30-59 ml/min (HCC), Diverticulosis of colon, GERD (gastroesophageal reflux disease), History of non-ST elevation myocardial infarction (NSTEMI) 2022, Impaired renal function, MRSA (methicillin resistant staph aureus) culture positive, Osteoarthritis of knee, Parkinson disease (HCC), Proteinuria, Skull fracture, Temporary loss of eyesight, and Tubular adenoma of colon.  Past Surgical History:  has a past surgical history that includes Colonoscopy (2012); shoulder surgery (Right); pr colsc flx w/rmvl of tumor polyp lesion snare tq (N/A, 2017); Colonoscopy (2017); Cholecystectomy, laparoscopic (N/A, 10/29/2022); Esophagogastroduodenoscopy (2023); Upper gastrointestinal endoscopy (N/A, 2023); Upper gastrointestinal endoscopy (N/A, 2023); Cardiac procedure (N/A, 2024); Cardiac procedure (N/A, 8/3/2024); and ep device procedure (N/A, 2024).    Assessment  Performance deficits / Impairments: Decreased functional mobility ;Decreased ADL status;Decreased strength;Decreased safe awareness;Decreased endurance;Decreased balance;Decreased vision/visual deficit;Decreased fine motor control;Decreased coordination  Assessment: Patient is normaly supervision with functional mobility with cane/RW, and min assist with personal ADLs. At this time patient  requires up to mod-min assist with functional mobility and up to max assist with ADLs. Patient would benefit from continued therapy during stay and after discharge from acute setting. Patient is not expected to safely return to prior living arrangements. Demonstrates need for 24/7 skilled assistance to safely perform personal ADLs and mobility.  Prognosis: Good  Decision Making: Medium Complexity  REQUIRES OT FOLLOW-UP: Yes  Activity Tolerance  Activity Tolerance: Patient limited by fatigue  Safety Devices  Type of Devices: Call light within reach;Gait belt;Nurse notified;Left in bed;Bed alarm in place  Restraints  Restraints Initially in Place: No    Restrictions/Precautions  Restrictions/Precautions  Restrictions/Precautions: Fall Risk  Required Braces or Orthoses?: No  Position Activity Restriction  Other position/activity restrictions: Up with assist; h/o Parkinsons    Subjective  General  Patient assessed for rehabilitation services?: Yes  Family / Caregiver Present: No  Subjective  Subjective: Patient seen after cleared by nursing for occupational therapy evaluation. Patient agreeable to evaluation and treatment  General Comment  Comments: Patient complains no pain during evaluation or intervention, states \"You what helps with my stiffness, about a half can/can of beer\"       Home Setup/Prior Level of Function  Social/Functional History  Lives With: Friend(s)  Type of Home: House  Home Layout: Multi-level;Able to Live on Main level with bedroom/bathroom;Performs ADL's on one level  Home Access: Stairs to enter with rails  Entrance Stairs - Number of Steps: 4  Entrance Stairs - Rails: Both  Bathroom Shower/Tub: Walk-in shower  Bathroom Toilet: Standard  Bathroom Equipment: Shower chair  Home Equipment: Cane;Walker - Rolling  Has the patient had two or more falls in the past year or any fall with injury in the past year?: No  Receives Help From: Friend(s)  ADL Assistance:  (Varied assist required per pt;

## 2024-11-21 NOTE — PROCEDURES
INSTRUMENTAL SWALLOW REPORT  MODIFIED BARIUM SWALLOW    NAME: Mina Gill   : 1940  MRN: [id number]       Date of Eval: 2024              Referring Diagnosis(es):      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 (HCC), CKD (chronic kidney disease) stage 2, GFR 60-89 ml/min, CKD (chronic kidney disease) stage 3, GFR 30-59 ml/min (HCC), Diverticulosis of colon, GERD (gastroesophageal reflux disease), History of non-ST elevation myocardial infarction (NSTEMI) 2022, Impaired renal function, MRSA (methicillin resistant staph aureus) culture positive, Osteoarthritis of knee, Parkinson disease (HCC), Proteinuria, Skull fracture, Temporary loss of eyesight, and Tubular adenoma of colon.  Past Surgical History:  has a past surgical history that includes Colonoscopy (2012); shoulder surgery (Right); pr colsc flx w/rmvl of tumor polyp lesion snare tq (N/A, 2017); Colonoscopy (2017); Cholecystectomy, laparoscopic (N/A, 10/29/2022); Esophagogastroduodenoscopy (2023); Upper gastrointestinal endoscopy (N/A, 2023); Upper gastrointestinal endoscopy (N/A, 2023); Cardiac procedure (N/A, 2024); Cardiac procedure (N/A, 8/3/2024); and ep device procedure (N/A, 2024).    Type of Study: Initial MBS      Patient Complaints/Reason for Referral:  Mina Gill was referred for a MBS to assess the efficiency of his/her swallow function, assess for aspiration, and to make recommendations regarding safe dietary consistencies, effective compensatory strategies, and safe eating environment.       Onset of problem:      Mina Gill is a 84 y.o. Non- / non  male who presents with Extremity Weakness   and is admitted to the hospital for the management of Weakness.     84-year-old male with past medical history of diabetes mellitus, CKD stage III, GERD, Parkinson disease, history of CAD, complete heart block status  straw      Dysphagia Outcome Severity Scale: Level 5: Mild dysphagia- Distant supervision. May need one diet consistency restricted  Penetration-Aspiration Scale (PAS): 1 - Material does not enter the airway    Recommended Diet:  Solid consistency: Dysphagia soft and bite/sized (Dysphagia III) (IDDSI Level 6)   Liquid consistency: Thin  Liquid administration via: Cup;Straw    Medication administration: Meds in puree    Safe Swallow Protocol:  Supervision: Close  Compensatory Swallowing Strategies : Alternate solids and liquids;Small bites/sips;Upright as possible for all oral intake;Eat/Feed slowly    Recommendations/Treatment  Requires SLP Intervention: Yes   2-3X     D/C Recommendations: Ongoing speech therapy is recommended at next level of care;Ongoing speech therapy is recommended during this hospitalization  Postural Changes and/or Swallow Maneuvers: Upright 90 degrees      Recommended Exercises:    Therapeutic Interventions: Diet tolerance monitoring    Education: Images and recommendations were reviewed with pt following this exam.   Patient Education: yes  Patient Education Response: Needs reinforcement    Safety Devices  Restraints Initially in Place: No      Goals:    Long Term:       To Maximize safety with intake, optimize nutrition/hydration and minimize risk for aspiration.      Short Term:    Dysphagia Goals: The patient will tolerate recommended diet without observed clinical signs of aspiration      Oral Preparation / Oral Phase: : Impaired    Pt does not have dentures for this assessment. Pt reports he has dentures and he wear them when he eats, then later reports he does not always wear them and it is hard to chew his food enough to get it down.     Pharyngeal Phase: WFL     Puree: No penetration no aspiration no stasis  Soft Solids: No penetration no aspiration no stasis  Regular solids: No penetration no aspiration no stasis  Thin straw: No penetration no aspiration min stasis  Nectar

## 2024-11-22 PROCEDURE — 96372 THER/PROPH/DIAG INJ SC/IM: CPT

## 2024-11-22 PROCEDURE — 6370000000 HC RX 637 (ALT 250 FOR IP): Performed by: NURSE PRACTITIONER

## 2024-11-22 PROCEDURE — 6370000000 HC RX 637 (ALT 250 FOR IP)

## 2024-11-22 PROCEDURE — 99232 SBSQ HOSP IP/OBS MODERATE 35: CPT | Performed by: STUDENT IN AN ORGANIZED HEALTH CARE EDUCATION/TRAINING PROGRAM

## 2024-11-22 PROCEDURE — 2580000003 HC RX 258: Performed by: NURSE PRACTITIONER

## 2024-11-22 PROCEDURE — G0378 HOSPITAL OBSERVATION PER HR: HCPCS

## 2024-11-22 PROCEDURE — 6360000002 HC RX W HCPCS: Performed by: NURSE PRACTITIONER

## 2024-11-22 PROCEDURE — 96361 HYDRATE IV INFUSION ADD-ON: CPT

## 2024-11-22 RX ADMIN — SUCRALFATE 1 G: 1 TABLET ORAL at 20:20

## 2024-11-22 RX ADMIN — PANTOPRAZOLE SODIUM 20 MG: 20 TABLET, DELAYED RELEASE ORAL at 07:55

## 2024-11-22 RX ADMIN — CLOPIDOGREL BISULFATE 75 MG: 75 TABLET ORAL at 07:55

## 2024-11-22 RX ADMIN — SODIUM CHLORIDE, PRESERVATIVE FREE 10 ML: 5 INJECTION INTRAVENOUS at 20:21

## 2024-11-22 RX ADMIN — TAMSULOSIN HYDROCHLORIDE 0.4 MG: 0.4 CAPSULE ORAL at 07:55

## 2024-11-22 RX ADMIN — HEPARIN SODIUM 5000 UNITS: 5000 INJECTION INTRAVENOUS; SUBCUTANEOUS at 07:07

## 2024-11-22 RX ADMIN — ENTACAPONE 200 MG: 200 TABLET, FILM COATED ORAL at 20:20

## 2024-11-22 RX ADMIN — SUCRALFATE 1 G: 1 TABLET ORAL at 11:45

## 2024-11-22 RX ADMIN — CARBIDOPA AND LEVODOPA 2 TABLET: 25; 100 TABLET ORAL at 07:55

## 2024-11-22 RX ADMIN — FUROSEMIDE 40 MG: 40 TABLET ORAL at 07:55

## 2024-11-22 RX ADMIN — ENTACAPONE 200 MG: 200 TABLET, FILM COATED ORAL at 07:55

## 2024-11-22 RX ADMIN — AMLODIPINE BESYLATE 5 MG: 10 TABLET ORAL at 07:54

## 2024-11-22 RX ADMIN — ATORVASTATIN CALCIUM 40 MG: 40 TABLET, FILM COATED ORAL at 20:20

## 2024-11-22 RX ADMIN — CETIRIZINE HYDROCHLORIDE 5 MG: 10 TABLET, FILM COATED ORAL at 07:55

## 2024-11-22 RX ADMIN — ACETAMINOPHEN 650 MG: 325 TABLET ORAL at 08:08

## 2024-11-22 RX ADMIN — HEPARIN SODIUM 5000 UNITS: 5000 INJECTION INTRAVENOUS; SUBCUTANEOUS at 20:20

## 2024-11-22 RX ADMIN — ROPINIROLE 0.5 MG: 0.5 TABLET, FILM COATED ORAL at 20:20

## 2024-11-22 RX ADMIN — ALLOPURINOL 100 MG: 100 TABLET ORAL at 07:55

## 2024-11-22 RX ADMIN — LATANOPROST 1 DROP: 50 SOLUTION OPHTHALMIC at 20:20

## 2024-11-22 RX ADMIN — Medication 400 MG: at 07:55

## 2024-11-22 RX ADMIN — AMITRIPTYLINE HYDROCHLORIDE 10 MG: 10 TABLET, FILM COATED ORAL at 20:20

## 2024-11-22 RX ADMIN — ENTACAPONE 200 MG: 200 TABLET, FILM COATED ORAL at 14:55

## 2024-11-22 RX ADMIN — ROPINIROLE 0.5 MG: 0.5 TABLET, FILM COATED ORAL at 14:56

## 2024-11-22 RX ADMIN — CARBIDOPA AND LEVODOPA 2 TABLET: 25; 100 TABLET ORAL at 20:20

## 2024-11-22 RX ADMIN — SUCRALFATE 1 G: 1 TABLET ORAL at 18:07

## 2024-11-22 RX ADMIN — SUCRALFATE 1 G: 1 TABLET ORAL at 07:07

## 2024-11-22 RX ADMIN — GABAPENTIN 100 MG: 100 CAPSULE ORAL at 07:55

## 2024-11-22 RX ADMIN — CARBIDOPA AND LEVODOPA 2 TABLET: 25; 100 TABLET ORAL at 14:55

## 2024-11-22 RX ADMIN — ROPINIROLE 0.5 MG: 0.5 TABLET, FILM COATED ORAL at 07:54

## 2024-11-22 RX ADMIN — ISOSORBIDE MONONITRATE 30 MG: 30 TABLET, EXTENDED RELEASE ORAL at 07:55

## 2024-11-22 ASSESSMENT — PAIN DESCRIPTION - LOCATION: LOCATION: HEAD

## 2024-11-22 ASSESSMENT — PAIN SCALES - GENERAL: PAINLEVEL_OUTOF10: 4

## 2024-11-22 NOTE — PROGRESS NOTES
[facility]  Office: [phone]  Greg Ding DO, Walt Allen DO, Arnaldo Beltre DO, Rikki White DO, Julisa Shafer MD, Lesli Altamirano MD, Marzena Berry MD, Sandrine Potter MD,  Jd Mcpherson MD, Adolph Blount MD, Jigar Frankel MD,  Kathleen Ventura DO, Akash Brown MD, Nj Tidwell MD, Trevin Ding DO, Radha Mauro MD,  Scott Paulino DO, Danitza Borrero MD, Claritza Morales MD, Ella Powell MD, Adrian Travis MD,  Stoney Flannery MD, Yamel Escobar MD, Josué Mayen MD, Vic Crenshaw MD, Marquise Vila MD, Stephanie Bonds MD, Delvin Chatman DO, Son Singh MD, Shirley Waterhouse, CNP,  Kathie Bennett, CNP, Delvin Anguiano, CNP,  Noemy Hogan, RON, Ana Rosa Kenny, CNP, Regina Rocha, CNP, Waleska Sierra, CNP, Rosalinda Champion, CNP, AD CokerC, AD KelleyC, Carline Thompson, CNP, Rosa Craig, CNP,  America Sandoval, CNP, Juany Floyd, CNP,  Chelsea Magaña, CNP, Cristina Hammond, CNP         [facility]   IN-PATIENT SERVICE   [facility]    Progress Note    11/22/2024    1:38 PM    Name:   Mina Gill  MRN:     [id number]     Acct:      [id number]   Room:   OBS 25/25-1  IP Day:  0  Admit Date:  11/19/2024  1:57 PM    PCP:   Tisha Glasgow MD  Code Status:  Full Code    Subjective:     C/C:   Chief Complaint   Patient presents with    Extremity Weakness     Interval History Status: improved.     S/p barium swallow 11/21   Safe swallow for Dysphagia soft and bite/sized (Dysphagia III) (IDDSI Level 6)  diet with thin liquids as evidenced by no observed aspiration noted with consistencies tested      Pt is agreeable for SNF--pending    Brief History:     84-year-old male with past medical history of diabetes mellitus, CKD stage III, GERD, Parkinson disease, history of CAD, complete heart block status post pacemaker, COPD with 90-pack-year smoking history presented to ED due to generalized weakness.  Feeling weak generalized  \"MQA1WRF\", \"MFAA5TKL\", \"P4CYXGSD\", \"O2SAT\", \"FIO2\"  Lab Results   Component Value Date/Time    SPECIAL Site: Urine 08/04/2024 12:03 PM     Lab Results   Component Value Date/Time    CULTURE NO GROWTH 08/04/2024 12:03 PM       Radiology:  FL MODIFIED BARIUM SWALLOW W VIDEO    Result Date: 11/21/2024  No penetration or aspiration with any material.  Extended mastication with solids.  Small amount of residue with liquids. Swallowing mechanism grossly within normal limits without evidence of aspiration. Please see separate speech pathology report for full discussion of findings and recommendations.     NM LUNG VENT/PERFUSION (VQ)    Result Date: 11/20/2024  Normal study.  No evidence for pulmonary embolism.     XR CHEST (2 VW)    Result Date: 11/19/2024  No acute cardiopulmonary process       Physical Examination:        General appearance:  alert, cooperative and no distress  Mental Status:  oriented to person, place and time and normal affect  Lungs:  clear to auscultation bilaterally, normal effort  Heart:  regular rate and rhythm, no murmur  Abdomen:  soft, nontender, nondistended, normal bowel sounds, no masses, hepatomegaly, splenomegaly  Extremities:  no edema, redness, tenderness in the calves  Skin:  no gross lesions, rashes, induration    Assessment:        Hospital Problems             Last Modified POA    * (Principal) Weakness 11/19/2024 Yes    Progressive angina (HCC) 11/20/2024 Yes    Stage 3b chronic kidney disease (CKD) (HCC) 11/20/2024 Yes    History of subdural hematoma 11/20/2024 Yes    History of coronary artery stent placement 11/20/2024 Yes    Chest pain 11/20/2024 Yes    Hyperlipidemia 11/20/2024 Yes    Essential hypertension 11/20/2024 Yes    History of skull fracture 11/20/2024 Yes    Overview Signed 1/8/2018  9:36 AM by Jenn Vaughn APRN - NP     d/t 12-foot drop on the job         Pure hypercholesterolemia 11/20/2024 Yes    Parkinson disease (HCC) 11/20/2024 Yes    Pulmonary

## 2024-11-22 NOTE — DISCHARGE INSTR - COC
Continuity of Care Form    Patient Name: Mina Gill   :  1940  MRN:  [id number]    Admit date:  2024  Discharge date:  2024    Code Status Order: Full Code   Advance Directives:   Advance Care Flowsheet Documentation             Admitting Physician:  Vangie Oliva MD  PCP: Tisha Glasgow MD    Discharging Nurse: Siena  Discharging Hospital Unit/Room#: OBS -1  Discharging Unit Phone Number: [phone]    Emergency Contact:   Extended Emergency Contact Information  Primary Emergency Contact: Delvin Gill  Address: [address] [facility] [address] [place] [address]  Home Phone: [phone]  Relation: Child  Secondary Emergency Contact: Ivone Gill           [place], [place]  Home Phone: [phone]  Mobile Phone: [phone]  Relation: Child    Past Surgical History:  Past Surgical History:   Procedure Laterality Date    CARDIAC PROCEDURE N/A 2024    ali / Left heart cath / coronary angiography performed by Lobito Tracy MD at [facility] CARDIAC CATH LAB    CARDIAC PROCEDURE N/A 8/3/2024    Insert temporary pacemaker performed by Guerita Jeffrey MD at [facility] CARDIAC CATH LAB    CHOLECYSTECTOMY, LAPAROSCOPIC N/A 10/29/2022    LAPROSCOPIC CHOLECYSTECTOMY performed by Cj Valencia DO at [facility] OR    COLONOSCOPY  2012    poor prep, tubular adenoma    COLONOSCOPY  2017    diverticulosis, multiple polyps as described, spot marking done, pathology-tubular adenoma x 4    EP DEVICE PROCEDURE N/A 2024    you / ppm implant / rm 3021 performed by Omid Stone MD at [facility] CARDIAC CATH LAB    ESOPHAGOGASTRODUODENOSCOPY  2023    VT COLSC FLX W/RMVL OF TUMOR POLYP LESION SNARE TQ N/A 2017    COLONOSCOPY POLYPECTOMY SNARE/COLD BIOPSY with tatooing and apc transverse colon performed by Ross Garcia MD at [facility] OR    SHOULDER SURGERY Right     rotator cuff    UPPER GASTROINTESTINAL ENDOSCOPY N/A 2023    EGD ESOPHAGOGASTRODUODENOSCOPY performed by Constantine  Detail Level: Detailed  is not on home oxygen therapy.  Ventilator:    - No ventilator support    Rehab Therapies: Physical Therapy, Occupational Therapy, and Speech/Language Therapy  Weight Bearing Status/Restrictions: No weight bearing restrictions  Other Medical Equipment (for information only, NOT a DME order):  cane  Other Treatments: n/a    Patient's personal belongings (please select all that are sent with patient):  Glasses    RN SIGNATURE:  Electronically signed by Annamaria Martínez RN on 11/23/24 at 10:25 AM EST    CASE MANAGEMENT/SOCIAL WORK SECTION    Inpatient Status Date: ***    Readmission Risk Assessment Score:  Readmission Risk              Risk of Unplanned Readmission:  0           Discharging to Facility/ Agency   Name: Sharla leong  Address:  Phone: [phone]  Fax:    Dialysis Facility (if applicable)   Name:  Address:  Dialysis Schedule:  Phone:  Fax:    / signature: Electronically signed by Sneha Garcias RN on 11/25/24 at 4:18 PM EST    PHYSICIAN SECTION    Prognosis: Fair    Condition at Discharge: Stable    Rehab Potential (if transferring to Rehab): Fair    Recommended Labs or Other Treatments After Discharge: cbc and bmp in 2 weeks     Physician Certification: I certify the above information and transfer of Mina Gill  is necessary for the continuing treatment of the diagnosis listed and that he requires Skilled Nursing Facility for greater 30 days.     Update Admission H&P: No change in H&P    PHYSICIAN SIGNATURE:  Electronically signed by Vangie Oliva MD on 11/22/24 at 10:53 AM EST   X Size Of Lesion In Cm (Optional): 0

## 2024-11-23 PROCEDURE — 6370000000 HC RX 637 (ALT 250 FOR IP): Performed by: NURSE PRACTITIONER

## 2024-11-23 PROCEDURE — G0378 HOSPITAL OBSERVATION PER HR: HCPCS

## 2024-11-23 PROCEDURE — 99232 SBSQ HOSP IP/OBS MODERATE 35: CPT | Performed by: STUDENT IN AN ORGANIZED HEALTH CARE EDUCATION/TRAINING PROGRAM

## 2024-11-23 PROCEDURE — 2580000003 HC RX 258: Performed by: NURSE PRACTITIONER

## 2024-11-23 PROCEDURE — 6360000002 HC RX W HCPCS: Performed by: NURSE PRACTITIONER

## 2024-11-23 PROCEDURE — 96372 THER/PROPH/DIAG INJ SC/IM: CPT

## 2024-11-23 RX ADMIN — SODIUM CHLORIDE, PRESERVATIVE FREE 10 ML: 5 INJECTION INTRAVENOUS at 21:28

## 2024-11-23 RX ADMIN — PANTOPRAZOLE SODIUM 20 MG: 20 TABLET, DELAYED RELEASE ORAL at 10:53

## 2024-11-23 RX ADMIN — ROPINIROLE 0.5 MG: 0.5 TABLET, FILM COATED ORAL at 14:16

## 2024-11-23 RX ADMIN — HEPARIN SODIUM 5000 UNITS: 5000 INJECTION INTRAVENOUS; SUBCUTANEOUS at 14:16

## 2024-11-23 RX ADMIN — FUROSEMIDE 40 MG: 40 TABLET ORAL at 09:41

## 2024-11-23 RX ADMIN — ATORVASTATIN CALCIUM 40 MG: 40 TABLET, FILM COATED ORAL at 20:44

## 2024-11-23 RX ADMIN — CARBIDOPA AND LEVODOPA 2 TABLET: 25; 100 TABLET ORAL at 09:41

## 2024-11-23 RX ADMIN — SUCRALFATE 1 G: 1 TABLET ORAL at 05:57

## 2024-11-23 RX ADMIN — CARBIDOPA AND LEVODOPA 2 TABLET: 25; 100 TABLET ORAL at 14:16

## 2024-11-23 RX ADMIN — SUCRALFATE 1 G: 1 TABLET ORAL at 20:43

## 2024-11-23 RX ADMIN — CARBIDOPA AND LEVODOPA 2 TABLET: 25; 100 TABLET ORAL at 20:43

## 2024-11-23 RX ADMIN — SODIUM CHLORIDE, PRESERVATIVE FREE 10 ML: 5 INJECTION INTRAVENOUS at 09:41

## 2024-11-23 RX ADMIN — AMITRIPTYLINE HYDROCHLORIDE 10 MG: 10 TABLET, FILM COATED ORAL at 21:40

## 2024-11-23 RX ADMIN — ALLOPURINOL 100 MG: 100 TABLET ORAL at 09:41

## 2024-11-23 RX ADMIN — LATANOPROST 1 DROP: 50 SOLUTION OPHTHALMIC at 20:44

## 2024-11-23 RX ADMIN — ENTACAPONE 200 MG: 200 TABLET, FILM COATED ORAL at 14:16

## 2024-11-23 RX ADMIN — SUCRALFATE 1 G: 1 TABLET ORAL at 09:41

## 2024-11-23 RX ADMIN — ENTACAPONE 200 MG: 200 TABLET, FILM COATED ORAL at 21:40

## 2024-11-23 RX ADMIN — HEPARIN SODIUM 5000 UNITS: 5000 INJECTION INTRAVENOUS; SUBCUTANEOUS at 05:57

## 2024-11-23 RX ADMIN — FLUTICASONE PROPIONATE 2 SPRAY: 50 SPRAY, METERED NASAL at 10:53

## 2024-11-23 RX ADMIN — ENTACAPONE 200 MG: 200 TABLET, FILM COATED ORAL at 10:53

## 2024-11-23 RX ADMIN — TAMSULOSIN HYDROCHLORIDE 0.4 MG: 0.4 CAPSULE ORAL at 09:41

## 2024-11-23 RX ADMIN — HEPARIN SODIUM 5000 UNITS: 5000 INJECTION INTRAVENOUS; SUBCUTANEOUS at 21:30

## 2024-11-23 RX ADMIN — ROPINIROLE 0.5 MG: 0.5 TABLET, FILM COATED ORAL at 20:44

## 2024-11-23 RX ADMIN — AMLODIPINE BESYLATE 5 MG: 10 TABLET ORAL at 09:41

## 2024-11-23 RX ADMIN — CLOPIDOGREL BISULFATE 75 MG: 75 TABLET ORAL at 09:43

## 2024-11-23 RX ADMIN — ROPINIROLE 0.5 MG: 0.5 TABLET, FILM COATED ORAL at 09:41

## 2024-11-23 RX ADMIN — GABAPENTIN 100 MG: 100 CAPSULE ORAL at 09:41

## 2024-11-23 RX ADMIN — ISOSORBIDE MONONITRATE 30 MG: 30 TABLET, EXTENDED RELEASE ORAL at 09:41

## 2024-11-23 RX ADMIN — CETIRIZINE HYDROCHLORIDE 5 MG: 10 TABLET, FILM COATED ORAL at 09:41

## 2024-11-23 RX ADMIN — Medication 400 MG: at 09:41

## 2024-11-23 RX ADMIN — SUCRALFATE 1 G: 1 TABLET ORAL at 17:39

## 2024-11-23 ASSESSMENT — PAIN SCALES - GENERAL: PAINLEVEL_OUTOF10: 0

## 2024-11-23 NOTE — PROGRESS NOTES
[facility]  Office: [phone]  Greg Ding DO, Walt Allen DO, Arnaldo Beltre DO, Rikki White DO, Julisa Shafer MD, Lesli Altamirano MD, Marzena Berry MD, Sandrine Potter MD,  Jd Mcpherson MD, Adolph Bluont MD, Jigar Frankel MD,  Kathleen Ventura DO, Akash Brown MD, Nj Tidwell MD, Trevin Ding DO, Radha Mauro MD,  Scott Paulino DO, Danitza Borrero MD, Claritza Morales MD, Ella Powell MD, Adrian Travis MD,  Stoney Flannery MD, Yamel Escobar MD, Josué Mayen MD, Vic Crenshaw MD, Marquise Vila MD, Stephanie Bonds MD, Delvin Chatman DO, Son Singh MD, Shirley Waterhouse, CNP,  Kathie Bennett, CNP, Delvin Anguiano, CNP,  Noemy Hogan, RON, Ana Rosa Kenny, CNP, Regina Rocha, CNP, Waleska Sierra, CNP, Rosalinda Champion, CNP, AD CokerC, AD KelleyC, Carline Thompson, CNP, Rosa Craig, CNP,  America Sandoval, CNP, Juany Floyd, CNP,  Chelsea Magaña, CNP, Cristina Hammond, CNP         [facility]   IN-PATIENT SERVICE   [facility]    Progress Note    11/23/2024    12:02 PM    Name:   Mina Gill  MRN:     [id number]     Acct:      [id number]   Room:   0330/0330-01   Day:  0  Admit Date:  11/19/2024  1:57 PM    PCP:   Tisha Glasgow MD  Code Status:  Full Code    Subjective:     C/C:   Chief Complaint   Patient presents with    Extremity Weakness     Interval History Status: improved.     S/p barium swallow 11/21   Safe swallow for Dysphagia soft and bite/sized (Dysphagia III) (IDDSI Level 6)  diet with thin liquids as evidenced by no observed aspiration noted with consistencies tested      Pt is agreeable for SNF--pending    Brief History:     84-year-old male with past medical history of diabetes mellitus, CKD stage III, GERD, Parkinson disease, history of CAD, complete heart block status post pacemaker, COPD with 90-pack-year smoking history presented to ED due to generalized weakness.  Feeling weak generalized

## 2024-11-23 NOTE — PLAN OF CARE
Problem: Discharge Planning  Goal: Discharge to home or other facility with appropriate resources  11/23/2024 1754 by Annamaria Martínez RN  Outcome: Progressing  11/23/2024 0607 by Elli Saunders RN  Outcome: Progressing     Problem: Skin/Tissue Integrity  Goal: Absence of new skin breakdown  Description: 1.  Monitor for areas of redness and/or skin breakdown  2.  Assess vascular access sites hourly  3.  Every 4-6 hours minimum:  Change oxygen saturation probe site  4.  Every 4-6 hours:  If on nasal continuous positive airway pressure, respiratory therapy assess nares and determine need for appliance change or resting period.  11/23/2024 1754 by Annamaria Martínez RN  Outcome: Progressing  11/23/2024 0607 by Elli Saunders RN  Outcome: Progressing     Problem: Safety - Adult  Goal: Free from fall injury  11/23/2024 1754 by Annamaria Martínez RN  Outcome: Progressing  11/23/2024 0607 by Elli Saunders RN  Outcome: Progressing     Problem: ABCDS Injury Assessment  Goal: Absence of physical injury  11/23/2024 1754 by Annamaria Martínez RN  Outcome: Progressing  11/23/2024 0607 by Elli Saunders RN  Outcome: Progressing     Problem: Pain  Goal: Verbalizes/displays adequate comfort level or baseline comfort level  11/23/2024 1754 by Annamaria Martínez RN  Outcome: Progressing  11/23/2024 0607 by Elli Saunders RN  Outcome: Progressing     Problem: Chronic Conditions and Co-morbidities  Goal: Patient's chronic conditions and co-morbidity symptoms are monitored and maintained or improved  11/23/2024 1754 by Annamaria Martínez RN  Outcome: Progressing  11/23/2024 0607 by Elli Saunders RN  Outcome: Progressing

## 2024-11-24 PROCEDURE — G0378 HOSPITAL OBSERVATION PER HR: HCPCS

## 2024-11-24 PROCEDURE — 99232 SBSQ HOSP IP/OBS MODERATE 35: CPT | Performed by: STUDENT IN AN ORGANIZED HEALTH CARE EDUCATION/TRAINING PROGRAM

## 2024-11-24 PROCEDURE — 2580000003 HC RX 258: Performed by: NURSE PRACTITIONER

## 2024-11-24 PROCEDURE — 6370000000 HC RX 637 (ALT 250 FOR IP): Performed by: NURSE PRACTITIONER

## 2024-11-24 PROCEDURE — 97530 THERAPEUTIC ACTIVITIES: CPT

## 2024-11-24 PROCEDURE — 96372 THER/PROPH/DIAG INJ SC/IM: CPT

## 2024-11-24 PROCEDURE — 6370000000 HC RX 637 (ALT 250 FOR IP): Performed by: STUDENT IN AN ORGANIZED HEALTH CARE EDUCATION/TRAINING PROGRAM

## 2024-11-24 PROCEDURE — 6360000002 HC RX W HCPCS: Performed by: NURSE PRACTITIONER

## 2024-11-24 PROCEDURE — 97535 SELF CARE MNGMENT TRAINING: CPT

## 2024-11-24 PROCEDURE — 97110 THERAPEUTIC EXERCISES: CPT

## 2024-11-24 PROCEDURE — 6370000000 HC RX 637 (ALT 250 FOR IP)

## 2024-11-24 RX ORDER — GUAIFENESIN/DEXTROMETHORPHAN 100-10MG/5
5 SYRUP ORAL EVERY 4 HOURS PRN
Status: DISCONTINUED | OUTPATIENT
Start: 2024-11-24 | End: 2024-11-25 | Stop reason: HOSPADM

## 2024-11-24 RX ORDER — CALCIUM CARBONATE 500 MG/1
500 TABLET, CHEWABLE ORAL 3 TIMES DAILY PRN
Status: DISCONTINUED | OUTPATIENT
Start: 2024-11-24 | End: 2024-11-25 | Stop reason: HOSPADM

## 2024-11-24 RX ADMIN — PANTOPRAZOLE SODIUM 20 MG: 20 TABLET, DELAYED RELEASE ORAL at 09:41

## 2024-11-24 RX ADMIN — GABAPENTIN 100 MG: 100 CAPSULE ORAL at 09:42

## 2024-11-24 RX ADMIN — SUCRALFATE 1 G: 1 TABLET ORAL at 09:42

## 2024-11-24 RX ADMIN — ENTACAPONE 200 MG: 200 TABLET, FILM COATED ORAL at 09:41

## 2024-11-24 RX ADMIN — ROPINIROLE 0.5 MG: 0.5 TABLET, FILM COATED ORAL at 09:41

## 2024-11-24 RX ADMIN — FLUTICASONE PROPIONATE 2 SPRAY: 50 SPRAY, METERED NASAL at 09:41

## 2024-11-24 RX ADMIN — TAMSULOSIN HYDROCHLORIDE 0.4 MG: 0.4 CAPSULE ORAL at 09:42

## 2024-11-24 RX ADMIN — ANTACID TABLETS 500 MG: 500 TABLET, CHEWABLE ORAL at 01:03

## 2024-11-24 RX ADMIN — ENTACAPONE 200 MG: 200 TABLET, FILM COATED ORAL at 21:50

## 2024-11-24 RX ADMIN — SUCRALFATE 1 G: 1 TABLET ORAL at 16:46

## 2024-11-24 RX ADMIN — CETIRIZINE HYDROCHLORIDE 5 MG: 10 TABLET, FILM COATED ORAL at 09:42

## 2024-11-24 RX ADMIN — ATORVASTATIN CALCIUM 40 MG: 40 TABLET, FILM COATED ORAL at 21:49

## 2024-11-24 RX ADMIN — CLOPIDOGREL BISULFATE 75 MG: 75 TABLET ORAL at 09:42

## 2024-11-24 RX ADMIN — HEPARIN SODIUM 5000 UNITS: 5000 INJECTION INTRAVENOUS; SUBCUTANEOUS at 05:48

## 2024-11-24 RX ADMIN — ENTACAPONE 200 MG: 200 TABLET, FILM COATED ORAL at 14:01

## 2024-11-24 RX ADMIN — SUCRALFATE 1 G: 1 TABLET ORAL at 21:49

## 2024-11-24 RX ADMIN — LATANOPROST 1 DROP: 50 SOLUTION OPHTHALMIC at 21:56

## 2024-11-24 RX ADMIN — AMITRIPTYLINE HYDROCHLORIDE 10 MG: 10 TABLET, FILM COATED ORAL at 21:49

## 2024-11-24 RX ADMIN — CARBIDOPA AND LEVODOPA 2 TABLET: 25; 100 TABLET ORAL at 14:01

## 2024-11-24 RX ADMIN — SUCRALFATE 1 G: 1 TABLET ORAL at 05:48

## 2024-11-24 RX ADMIN — ROPINIROLE 0.5 MG: 0.5 TABLET, FILM COATED ORAL at 14:01

## 2024-11-24 RX ADMIN — Medication 400 MG: at 09:41

## 2024-11-24 RX ADMIN — CARBIDOPA AND LEVODOPA 2 TABLET: 25; 100 TABLET ORAL at 21:49

## 2024-11-24 RX ADMIN — CARBIDOPA AND LEVODOPA 2 TABLET: 25; 100 TABLET ORAL at 09:42

## 2024-11-24 RX ADMIN — ALLOPURINOL 100 MG: 100 TABLET ORAL at 09:42

## 2024-11-24 RX ADMIN — GUAIFENESIN AND DEXTROMETHORPHAN 5 ML: 100; 10 SYRUP ORAL at 16:46

## 2024-11-24 RX ADMIN — ROPINIROLE 0.5 MG: 0.5 TABLET, FILM COATED ORAL at 21:49

## 2024-11-24 RX ADMIN — HEPARIN SODIUM 5000 UNITS: 5000 INJECTION INTRAVENOUS; SUBCUTANEOUS at 14:01

## 2024-11-24 RX ADMIN — SODIUM CHLORIDE, PRESERVATIVE FREE 10 ML: 5 INJECTION INTRAVENOUS at 21:58

## 2024-11-24 RX ADMIN — SODIUM CHLORIDE, PRESERVATIVE FREE 10 ML: 5 INJECTION INTRAVENOUS at 09:41

## 2024-11-24 RX ADMIN — HEPARIN SODIUM 5000 UNITS: 5000 INJECTION INTRAVENOUS; SUBCUTANEOUS at 21:49

## 2024-11-24 ASSESSMENT — PAIN SCALES - GENERAL: PAINLEVEL_OUTOF10: 6

## 2024-11-24 NOTE — PROGRESS NOTES
Physical Therapy  Facility/Department: [facility] RENAL//MED SURG  Physical Therapy    Name: Mina Gill  : 1940  MRN: [id number]  Date of Service: 2024    Discharge Recommendations:  Patient would benefit from continued therapy after discharge       Patient Diagnosis(es): The encounter diagnosis was Generalized weakness.  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  Past Surgical History:  has a past surgical history that includes Colonoscopy (2012); shoulder surgery (Right); pr colsc flx w/rmvl of tumor polyp lesion snare tq (N/A, 2017); Colonoscopy (2017); Cholecystectomy, laparoscopic (N/A, 10/29/2022); Esophagogastroduodenoscopy (2023); Upper gastrointestinal endoscopy (N/A, 2023); Upper gastrointestinal endoscopy (N/A, 2023); Cardiac procedure (N/A, 2024); Cardiac procedure (N/A, 8/3/2024); and ep device procedure (N/A, 2024).    Assessment  Assessment: pt amb 70 ft RW min assist of 1. pt martín sit<->stand with CGA of 1 & use of RW. Pt lacks safety awareness & is unsafe to amb without assist @ this time as he is a fall risk. pt could benefit from cont therapy to improve his dynamic functional mobility.  Requires PT Follow-Up: Yes  Activity Tolerance  Activity Tolerance Comments: pt amb 70 ft RW min assist of 1    Plan  Physical Therapy Plan  General Plan:  (5-6x/wk)  Current Treatment Recommendations: Strengthening, ROM, Balance training,

## 2024-11-24 NOTE — PROGRESS NOTES
[facility]  Office: [phone]  Greg Ding DO, Walt Allen DO, Arnaldo Beltre DO, Rikki White DO, Julisa Shafer MD, Lesli Altamirano MD, Marzena Berry MD, Sandrine Potter MD,  Jd Mcpherson MD, Adolph Blount MD, Jigar Frankel MD,  Kathleen Ventura DO, Akash Brown MD, Nj Tidwell MD, Trevin Ding DO, Radha Mauro MD,  Scott Paulino DO, Danitza Borrero MD, Claritza Morales MD, Ella Powell MD, Adrian Travis MD,  Stoney Flannery MD, Yamel Escobar MD, Josué Mayen MD, Vic Crenshaw MD, Marquise Vila MD, Stephanie Bonds MD, Delvin Chatman DO, Son Singh MD, Shirley Waterhouse, CNP,  Kathie Bennett, CNP, Delvin Anguiano, CNP,  Noemy Hogan, RON, Ana Rosa Kenny, CNP, Regina Rocha, CNP, Waleska Sierra, CNP, Rosalinda Champion, CNP, AD CokerC, AD KelleyC, Carline Thompson, CNP, Rosa Craig, CNP,  America Sandoval, CNP, Juany Floyd, CNP,  Chelsea Magaña, CNP, Cristina Hammond, CNP         [facility]   IN-PATIENT SERVICE   [facility]    Progress Note    11/24/2024    1:12 PM    Name:   Mina Gill  MRN:     [id number]     Acct:      [id number]   Room:   0330/0330-01   Day:  0  Admit Date:  11/19/2024  1:57 PM    PCP:   Tisha Glasgow MD  Code Status:  Full Code    Subjective:     C/C:   Chief Complaint   Patient presents with    Extremity Weakness     Interval History Status: improved.     S/p barium swallow 11/21   Safe swallow for Dysphagia soft and bite/sized (Dysphagia III) (IDDSI Level 6)  diet with thin liquids as evidenced by no observed aspiration noted with consistencies tested      Pt is agreeable for SNF--pending    Brief History:     84-year-old male with past medical history of diabetes mellitus, CKD stage III, GERD, Parkinson disease, history of CAD, complete heart block status post pacemaker, COPD with 90-pack-year smoking history presented to ED due to generalized weakness.  Feeling weak generalized  \"[id number]\", \"PHENYTOIN\", \"PHENYF\", \"URICACID\", \"POCGLU\" in the last 72 hours.    Invalid input(s): \"PROT\", \"H1IUQTJ\", \"LABGGT\", \"LDLCHOLESTEROL\"    ABG:No results found for: \"POCPH\", \"PHART\", \"PH\", \"POCPCO2\", \"WMF0WAM\", \"PCO2\", \"POCPO2\", \"PO2ART\", \"PO2\", \"POCHCO3\", \"QJE3TUD\", \"HCO3\", \"NBEA\", \"PBEA\", \"BEART\", \"BE\", \"THGBART\", \"THB\", \"TIU5EZN\", \"CVOZ2YHU\", \"N0ORLXVZ\", \"O2SAT\", \"FIO2\"  Lab Results   Component Value Date/Time    SPECIAL Site: Urine 08/04/2024 12:03 PM     Lab Results   Component Value Date/Time    CULTURE NO GROWTH 08/04/2024 12:03 PM       Radiology:  FL MODIFIED BARIUM SWALLOW W VIDEO    Result Date: 11/21/2024  No penetration or aspiration with any material.  Extended mastication with solids.  Small amount of residue with liquids. Swallowing mechanism grossly within normal limits without evidence of aspiration. Please see separate speech pathology report for full discussion of findings and recommendations.     NM LUNG VENT/PERFUSION (VQ)    Result Date: 11/20/2024  Normal study.  No evidence for pulmonary embolism.     XR CHEST (2 VW)    Result Date: 11/19/2024  No acute cardiopulmonary process       Physical Examination:        General appearance:  alert, cooperative and no distress  Mental Status:  oriented to person, place and time and normal affect  Lungs:  clear to auscultation bilaterally, normal effort  Heart:  regular rate and rhythm, no murmur  Abdomen:  soft, nontender, nondistended, normal bowel sounds, no masses, hepatomegaly, splenomegaly  Extremities:  no edema, redness, tenderness in the calves  Skin:  no gross lesions, rashes, induration    Assessment:        Hospital Problems             Last Modified POA    * (Principal) Weakness 11/19/2024 Yes    Progressive angina (HCC) 11/20/2024 Yes    Stage 3b chronic kidney disease (CKD) (HCC) 11/20/2024 Yes    History of subdural hematoma 11/20/2024 Yes    History of coronary artery stent placement 11/20/2024 Yes    Chest pain 11/20/2024 Yes

## 2024-11-24 NOTE — PROGRESS NOTES
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  Past Surgical History:  has a past surgical history that includes Colonoscopy (2012); shoulder surgery (Right); pr colsc flx w/rmvl of tumor polyp lesion snare tq (N/A, 2017); Colonoscopy (2017); Cholecystectomy, laparoscopic (N/A, 10/29/2022); Esophagogastroduodenoscopy (2023); Upper gastrointestinal endoscopy (N/A, 2023); Upper gastrointestinal endoscopy (N/A, 2023); Cardiac procedure (N/A, 2024); Cardiac procedure (N/A, 8/3/2024); and ep device procedure (N/A, 2024).    Assessment  Performance deficits / Impairments: Decreased functional mobility ;Decreased ADL status;Decreased strength;Decreased safe awareness;Decreased endurance;Decreased balance;Decreased vision/visual deficit;Decreased fine motor control;Decreased coordination  Prognosis: Good  Activity Tolerance  Activity Tolerance: Patient Tolerated treatment well  Activity Tolerance Comments: once seated rest break at sink after prolonged stand at  darlene.    Balance  Balance  Sitting: With support (SBA for sitting in recliner)  Standing: Without support (CGA for STS from recliner up to RW. CGA for functional mobilit ywith RW. CGA-MIN A  for dynamic/static sstanding with RW. Pt intermittently uses UE support on RW, MIN A fatigue ~10mins a sink. ~15mins total.)    Transfers/Mobility  Bed mobility  Bed Mobility Comments: pt in recliner at arrival and depature.    Transfers  Sit to stand: Contact guard assistance  Stand to sit: Contact guard assistance  Transfer Comments: with RW, cues for hand placement and Rw management         Functional Mobility: Contact guard assistance  Functional Mobility Skilled Clinical Factors: functional mobility completed with RW and CGA for ADL distances bedside<>bathroom with slow pace, pt demo safe RW navigating in sight spaces, however needs cues for keeping RW on ground with transfer to recliner.       Exercises  OT Exercises  A/AROM Exercises: pt was ed for BUE AROM ex seated in recliner to promote joint mobility and tendon elongation for functional use with ADLs. Pt completes x10 reps for punches with 3' holds and shoulder flexion/ext with 3 sec holds.    Patient Education  Patient Education  Education Given To: Patient  Education Provided: ADL Adaptive Strategies;Home Exercise Program;Transfer Training;Mobility Training;Energy Conservation  Education Method: Verbal;Demonstration  Barriers to Learning: None  Education Outcome: Continued education needed    Goals  Short Term Goals  Time Frame for Short Term Goals: Prior to discharge  Short Term Goal 1: Patient to demonstrate dyanmic sitting balance with one hand support and supervision  Short Term Goal 2: Patient to demonstrate static/dyanmic standing balance with 1 hand support with Stand by assist for 10+ minutes at sink  Short Term Goal 3: Patient to perform functional mobility household distances with LRAD with supervisionAssist  Short Term Goal 4: Patient to

## 2024-11-24 NOTE — PLAN OF CARE
Problem: Discharge Planning  Goal: Discharge to home or other facility with appropriate resources  11/24/2024 1441 by Annamaria Martínez RN  Outcome: Progressing  11/24/2024 0415 by Elli Saunders RN  Outcome: Progressing     Problem: Skin/Tissue Integrity  Goal: Absence of new skin breakdown  Description: 1.  Monitor for areas of redness and/or skin breakdown  2.  Assess vascular access sites hourly  3.  Every 4-6 hours minimum:  Change oxygen saturation probe site  4.  Every 4-6 hours:  If on nasal continuous positive airway pressure, respiratory therapy assess nares and determine need for appliance change or resting period.  11/24/2024 1441 by Annamaria Martínez RN  Outcome: Progressing  11/24/2024 0415 by Elli Saunders RN  Outcome: Progressing     Problem: Safety - Adult  Goal: Free from fall injury  11/24/2024 1441 by Annamaria Martínez RN  Outcome: Progressing  11/24/2024 0415 by Elli Saunders RN  Outcome: Progressing     Problem: ABCDS Injury Assessment  Goal: Absence of physical injury  11/24/2024 1441 by Annamaria Martínez RN  Outcome: Progressing  11/24/2024 0415 by Elli Saunders RN  Outcome: Progressing     Problem: Pain  Goal: Verbalizes/displays adequate comfort level or baseline comfort level  11/24/2024 1441 by Annamaria Martínez RN  Outcome: Progressing  11/24/2024 0415 by Elli Saunders RN  Outcome: Progressing     Problem: Chronic Conditions and Co-morbidities  Goal: Patient's chronic conditions and co-morbidity symptoms are monitored and maintained or improved  11/24/2024 1441 by Annamaria Martínez RN  Outcome: Progressing  11/24/2024 0415 by Elli Saunders RN  Outcome: Progressing

## 2024-11-24 NOTE — PLAN OF CARE
Problem: Safety - Adult  Goal: Free from fall injury  11/24/2024 0415 by Elli Saunders, RN  Outcome: Progressing  11/23/2024 1754 by Annamaria Martínez, RN  Outcome: Progressing     Problem: Chronic Conditions and Co-morbidities  Goal: Patient's chronic conditions and co-morbidity symptoms are monitored and maintained or improved  11/24/2024 0415 by Elli Saunders RN  Outcome: Progressing  11/23/2024 1754 by Annamaria Martínez, RN  Outcome: Progressing     Problem: Discharge Planning  Goal: Discharge to home or other facility with appropriate resources  11/24/2024 0415 by Elli Saunders RN  Outcome: Progressing  11/23/2024 1754 by Annamaria Martínez, RN  Outcome: Progressing

## 2024-11-25 VITALS
TEMPERATURE: 98.3 F | SYSTOLIC BLOOD PRESSURE: 115 MMHG | HEART RATE: 70 BPM | WEIGHT: 178 LBS | BODY MASS INDEX: 27.94 KG/M2 | OXYGEN SATURATION: 93 % | DIASTOLIC BLOOD PRESSURE: 63 MMHG | HEIGHT: 67 IN | RESPIRATION RATE: 14 BRPM

## 2024-11-25 PROCEDURE — G0378 HOSPITAL OBSERVATION PER HR: HCPCS

## 2024-11-25 PROCEDURE — 99239 HOSP IP/OBS DSCHRG MGMT >30: CPT | Performed by: STUDENT IN AN ORGANIZED HEALTH CARE EDUCATION/TRAINING PROGRAM

## 2024-11-25 PROCEDURE — 96372 THER/PROPH/DIAG INJ SC/IM: CPT

## 2024-11-25 PROCEDURE — 2580000003 HC RX 258: Performed by: NURSE PRACTITIONER

## 2024-11-25 PROCEDURE — 82947 ASSAY GLUCOSE BLOOD QUANT: CPT

## 2024-11-25 PROCEDURE — 6360000002 HC RX W HCPCS: Performed by: NURSE PRACTITIONER

## 2024-11-25 PROCEDURE — 6370000000 HC RX 637 (ALT 250 FOR IP): Performed by: NURSE PRACTITIONER

## 2024-11-25 RX ORDER — ALLOPURINOL 100 MG/1
TABLET ORAL
Qty: 90 TABLET | Refills: 0 | Status: SHIPPED | OUTPATIENT
Start: 2024-11-25

## 2024-11-25 RX ORDER — INSULIN LISPRO 100 [IU]/ML
0-8 INJECTION, SOLUTION INTRAVENOUS; SUBCUTANEOUS
Status: DISCONTINUED | OUTPATIENT
Start: 2024-11-25 | End: 2024-11-25 | Stop reason: HOSPADM

## 2024-11-25 RX ORDER — GLUCAGON 1 MG/ML
1 KIT INJECTION PRN
Status: DISCONTINUED | OUTPATIENT
Start: 2024-11-25 | End: 2024-11-25 | Stop reason: HOSPADM

## 2024-11-25 RX ORDER — DEXTROSE MONOHYDRATE 100 MG/ML
INJECTION, SOLUTION INTRAVENOUS CONTINUOUS PRN
Status: DISCONTINUED | OUTPATIENT
Start: 2024-11-25 | End: 2024-11-25 | Stop reason: HOSPADM

## 2024-11-25 RX ADMIN — ENTACAPONE 200 MG: 200 TABLET, FILM COATED ORAL at 14:02

## 2024-11-25 RX ADMIN — FUROSEMIDE 40 MG: 40 TABLET ORAL at 10:34

## 2024-11-25 RX ADMIN — ROPINIROLE 0.5 MG: 0.5 TABLET, FILM COATED ORAL at 14:02

## 2024-11-25 RX ADMIN — CARBIDOPA AND LEVODOPA 2 TABLET: 25; 100 TABLET ORAL at 10:32

## 2024-11-25 RX ADMIN — CLOPIDOGREL BISULFATE 75 MG: 75 TABLET ORAL at 10:35

## 2024-11-25 RX ADMIN — SUCRALFATE 1 G: 1 TABLET ORAL at 05:47

## 2024-11-25 RX ADMIN — SUCRALFATE 1 G: 1 TABLET ORAL at 17:47

## 2024-11-25 RX ADMIN — ISOSORBIDE MONONITRATE 30 MG: 30 TABLET, EXTENDED RELEASE ORAL at 10:36

## 2024-11-25 RX ADMIN — FLUTICASONE PROPIONATE 2 SPRAY: 50 SPRAY, METERED NASAL at 10:47

## 2024-11-25 RX ADMIN — Medication 400 MG: at 10:35

## 2024-11-25 RX ADMIN — HEPARIN SODIUM 5000 UNITS: 5000 INJECTION INTRAVENOUS; SUBCUTANEOUS at 05:47

## 2024-11-25 RX ADMIN — CARBIDOPA AND LEVODOPA 2 TABLET: 25; 100 TABLET ORAL at 14:02

## 2024-11-25 RX ADMIN — CETIRIZINE HYDROCHLORIDE 5 MG: 10 TABLET, FILM COATED ORAL at 10:34

## 2024-11-25 RX ADMIN — SODIUM CHLORIDE, PRESERVATIVE FREE 10 ML: 5 INJECTION INTRAVENOUS at 10:37

## 2024-11-25 RX ADMIN — HEPARIN SODIUM 5000 UNITS: 5000 INJECTION INTRAVENOUS; SUBCUTANEOUS at 14:02

## 2024-11-25 RX ADMIN — ENTACAPONE 200 MG: 200 TABLET, FILM COATED ORAL at 10:32

## 2024-11-25 RX ADMIN — GABAPENTIN 100 MG: 100 CAPSULE ORAL at 10:33

## 2024-11-25 RX ADMIN — ROPINIROLE 0.5 MG: 0.5 TABLET, FILM COATED ORAL at 10:33

## 2024-11-25 RX ADMIN — ALLOPURINOL 100 MG: 100 TABLET ORAL at 10:35

## 2024-11-25 RX ADMIN — PANTOPRAZOLE SODIUM 20 MG: 20 TABLET, DELAYED RELEASE ORAL at 10:36

## 2024-11-25 RX ADMIN — SUCRALFATE 1 G: 1 TABLET ORAL at 10:32

## 2024-11-25 RX ADMIN — AMLODIPINE BESYLATE 5 MG: 10 TABLET ORAL at 10:34

## 2024-11-25 RX ADMIN — TAMSULOSIN HYDROCHLORIDE 0.4 MG: 0.4 CAPSULE ORAL at 10:33

## 2024-11-25 NOTE — PLAN OF CARE
Problem: Safety - Adult  Goal: Free from fall injury  11/25/2024 0432 by Kaitlynn Rivera, RN  Outcome: Progressing

## 2024-11-25 NOTE — PLAN OF CARE
Problem: Discharge Planning  Goal: Discharge to home or other facility with appropriate resources  Outcome: Progressing     Problem: Skin/Tissue Integrity  Goal: Absence of new skin breakdown  Description: 1.  Monitor for areas of redness and/or skin breakdown  2.  Assess vascular access sites hourly  3.  Every 4-6 hours minimum:  Change oxygen saturation probe site  4.  Every 4-6 hours:  If on nasal continuous positive airway pressure, respiratory therapy assess nares and determine need for appliance change or resting period.  Outcome: Progressing     Problem: Safety - Adult  Goal: Free from fall injury  11/25/2024 1345 by Luis Branham, RN  Outcome: Progressing  11/25/2024 0432 by Kaitlynn Rivera, RN  Outcome: Progressing     Problem: ABCDS Injury Assessment  Goal: Absence of physical injury  Outcome: Progressing     Problem: Pain  Goal: Verbalizes/displays adequate comfort level or baseline comfort level  Outcome: Progressing     Problem: Chronic Conditions and Co-morbidities  Goal: Patient's chronic conditions and co-morbidity symptoms are monitored and maintained or improved  Outcome: Progressing

## 2024-11-25 NOTE — TELEPHONE ENCOUNTER
Protocol PRN    Pulse Oximetry Spot Check PRN    Hemoglobin A1c TOMORROW AM    POCT glucose 4 TIMES DAILY BEFORE MEALS & AT BEDTIME    HYPOGLYCEMIA TREATMENT: blood glucose LESS THAN 70 mg/dL and patient ALERT and TOLERATING PO PRN    HYPOGLYCEMIA TREATMENT: blood glucose LESS THAN 70 mg/dL and patient NOT ALERT or NPO PRN    POCT Glucose PRN                Patient Active Problem List:     Temporary loss of eyesight     Syncope : posterior circulation stroke vs Neurocardiogenic syncope      Intracranial bleed : traumatic left sub-dural hematoma ,managed conservatively in 2011     Headache     CKD (chronic kidney disease) stage 3, GFR 30-59 ml/min (Formerly [facility])     Depression     Hyperlipidemia     Microhematuria     Microalbuminuria     Essential hypertension     Generalized abdominal pain     Tubular adenoma of colon     Diverticulosis of colon     History of skull fracture     Nausea     Pure hypercholesterolemia     Prediabetes     Parkinson disease ([facility])     Chronic post-traumatic headache, not intractable     Fall (on) (from) other stairs and steps, initial encounter     Strain of iliopsoas muscle, initial encounter     Chronic pain of left knee     Closed fracture of second toe of right foot     Closed fracture of second toe of left foot     Abnormal ECG     Cerebrovascular accident ([facility])     Chest pain, unspecified     Hematoma of scalp     Left ventricular hypertrophy     Mild aortic valve regurgitation     Pulmonary hypertension, mild ([facility])     Lumbar radiculopathy     Dizziness     Hyponatremia     Vomiting     General weakness     Overweight (BMI 25.0-29.9)     Frequent falls     Unspecified inflammatory spondylopathy, 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

## 2024-11-25 NOTE — DISCHARGE SUMMARY
[facility]  Office: [phone]  Greg Ding DO, Walt Allen DO, Arnaldo Beltre DO, Rikki White DO, Julisa Shafer MD, Lesli Altamirano MD, Marzena Berry MD, Sandrine Potter MD,  Jd Mcpherson MD, Adolph Blount MD, Jigar Frankel MD,  Kathleen Ventura DO, Akash Brown MD, Nj Tidwell MD, Trevin Ding DO, Radha Mauro MD,  Scott Paulino DO, Danitza Borrero MD, Claritza Morales MD, Ella Powell MD, Adrian Travis MD,  Stoney Flannery MD, Yamel Escobar MD, Josué Mayen MD, Vic Crenshaw MD, Marquise Vila MD, Stephanie Bonds MD, Delvin Chatman DO, Son Singh MD, Shirley Waterhouse, CNP,  Kathie Bennett, CNP, Delvin Anguiano, CNP,  Noemy Hogan, RON, Ana Rosa Kenny, CNP, Regina Rocha, CNP, Waleska Sierra, CNP, Rosalinda Champion, CNP, Geeta Mayo PA-C, Sharon Arenas PA-C, Carline Thompson, CNP, Rosa Craig, CNP,  America Sandoval, CNP, Juany Floyd, CNP,  Chelsea Magaña, CNP, Cristina Hammond, CNP         [facility]   IN-PATIENT SERVICE   [facility]    Discharge Summary     Patient ID: Mina Gill  :  1940   MRN: [id number]     ACCOUNT:  [id number]   Patient's PCP: Tisha Glasgow MD  Admit Date: 2024   Discharge Date: 2024     Length of Stay: 0  Code Status:  Full Code  Admitting Physician: Vangie Oliva MD  Discharge Physician: Vangie Oliva MD     Active Discharge Diagnoses:     Hospital Problem Lists:  Principal Problem:    Weakness  Active Problems:    Progressive angina (HCC)    Stage 3b chronic kidney disease (CKD) (HCC)    History of subdural hematoma    History of coronary artery stent placement    Chest pain    Hyperlipidemia    Essential hypertension    History of skull fracture    Pure hypercholesterolemia    Parkinson disease (HCC)    Pulmonary hypertension, mild (HCC)    Presence of coronary angioplasty implant and graft    Type 2 diabetes mellitus with chronic kidney disease (HCC)    Type 2 diabetes

## 2024-11-25 NOTE — CARE COORDINATION
10:30  Spoke with Mami manjarrez is still pending  
Called Alberto Wild for update on precert vm left requesting cb  2:00 Spoke with Mami Magdaleno precert is still pending  
Case Management Assessment  Initial Observation Evaluation    Date/Time of Evaluation: 11/20/2024 5:01 PM  Assessment Completed by: PHIL HOWARD RN    If patient is discharged prior to next notation, then this note serves as note for discharge by case management.    Patient Name: Mina Gill                   YOB: 1940  Diagnosis: Weakness [R53.1]  Generalized weakness [R53.1]                   Date / Time: 11/19/2024  1:57 PM      CM Services requested for transitional needs.     Patient Admission Status: Observation   Readmission Risk (Low < 19, Mod (19-27), High > 27): Readmission Risk Score: 18.8    Current PCP: Tisha Glasgow MD  PCP verified by CM? Yes       History Provided by: Patient  Patient Orientation: Alert and Oriented    Patient Cognition: Alert      ADLS  Prior functional level: Independent in ADLs/IADLs  Current functional level: Independent in ADLs/IADLs  Family can provide assistance at DC: No  Would you like Case Management to discuss the discharge plan with any other family members/significant others, and if so, who? No    Financial    Payor: [employer] MEDICARE / Plan: WELLCARE ACCESS / Product Type: *No Product type* /       Transportation/Food Security/Housing Addressed:  No issues identified.     Equipment needs:     Case Management Services Information Letter Provided []    Transition plan: home with friend, watch PT/OT  
Received Auth for the patient to admit to [facility].   Spoke to  patient at bedside, updated him on the auth.  He is agreeable to the plan.     [facility]   
Spoke with Mami from [employer], precert is still pending  
Therapy recommending SNF  Met with patient and friend JAEL   Patient defers snf choice to JAEL                     Post Acute Facility/Agency List     Provided patient with the following list, the list includes the overall star ratings obtained from CMS per the Medicare Web site (www.Medicare.gov):     [] Long Term Acute Care Facilities  [] Acute Inpatient Rehabilitation Facilities  [x] Skilled Nursing Facilities  [] Hospice Facilities  [] Home Care    Provided verbal instructions on how to utilize the QR Code to obtain additional detailed star ratings from www.Medicare.gov    Referral to   [facility] spoke to angy will review      14:06 #1 [facility]- called kare[facility] lambert   #2 [facility]-no bed available  #3 [facility]  [facility] but can clinically accept [facility]    16:17 met with patient and son updated await jael to return to decide     16:40 call from patients brother everyone is agreeable to [facility] called jatin will submit for precert.       
You can access the FollowMyHealth Patient Portal offered by [facility] by registering at the following website: [email or web address]. By joining [employer]’s FollowMyHealth portal, you will also be able to view your health information using other applications (apps) compatible with our system.

## 2024-11-25 NOTE — PROGRESS NOTES
[facility]  Office: [phone]  Greg Ding DO, Walt Allen DO, Arnaldo Beltre DO, Rikki White DO, Julisa Shafer MD, Lesli Altamirano MD, Marzena Berry MD, Sandrine Potter MD,  Jd Mcpherson MD, Adolph Blount MD, Jigar Frankel MD,  Kathleen Ventura DO, Akash Brown MD, Nj Tidwell MD, Trevin Ding DO, Radha Mauro MD,  Scott Paulino DO, Danitza Borrero MD, Claritza Morales MD, Ella Powell MD, Adrian Travis MD,  Stoney Flannery MD, Yamel Escobar MD, Josué Mayen MD, Vic Crenshaw MD, Marquise Vila MD, Stephanie Bonds MD, Delvin Chatman DO, Son Singh MD, Shirley Waterhouse, CNP,  Kathie Bennett, CNP, Delvin Anguiano, CNP,  Noemy Hogan, RON, Ana Rosa Kenny, CNP, Regina Rocha, CNP, Waleska Sierra, CNP, Rosalinda Champion, CNP, AD CokerC, AD KelleyC, Carline Thompson, CNP, Rosa Craig, CNP,  America Sandoval, CNP, Juany Floyd, CNP,  Chelsea Magaña, CNP, Cristina Hammond, CNP         [facility]   IN-PATIENT SERVICE   [facility]    Progress Note    11/25/2024    12:31 PM    Name:   Mina Gill  MRN:     [id number]     Acct:      [id number]   Room:   0330/0330-01   Day:  0  Admit Date:  11/19/2024  1:57 PM    PCP:   Tisha Glsagow MD  Code Status:  Full Code    Subjective:     C/C:   Chief Complaint   Patient presents with    Extremity Weakness     Interval History Status: improved.     S/p barium swallow 11/21   Safe swallow for Dysphagia soft and bite/sized (Dysphagia III) (IDDSI Level 6)  diet with thin liquids as evidenced by no observed aspiration noted with consistencies tested      Pt is agreeable for SNF--pending  Ok for discharge     Brief History:     84-year-old male with past medical history of diabetes mellitus, CKD stage III, GERD, Parkinson disease, history of CAD, complete heart block status post pacemaker, COPD with 90-pack-year smoking history presented to ED due to generalized weakness.  Feeling weak  11/20/2024 Yes    History of subdural hematoma 11/20/2024 Yes    History of coronary artery stent placement 11/20/2024 Yes    Chest pain 11/20/2024 Yes    Hyperlipidemia 11/20/2024 Yes    Essential hypertension 11/20/2024 Yes    History of skull fracture 11/20/2024 Yes    Overview Signed 1/8/2018  9:36 AM by Jenn Vaughn APRN - NP     d/t 12-foot drop on the job         Pure hypercholesterolemia 11/20/2024 Yes    Parkinson disease (HCC) 11/20/2024 Yes    Pulmonary hypertension, mild (HCC) 11/20/2024 Yes    Presence of coronary angioplasty implant and graft 11/20/2024 Yes    Type 2 diabetes mellitus with chronic kidney disease (HCC) 11/20/2024 Yes    Type 2 diabetes mellitus with hyperglycemia (HCC) 11/20/2024 Yes       Plan:        Generalized weakness-PT OT evaluation.  Pending skilled facility by case management.  History of complete heart block-s/p temporary pacemaker insertion 8/3/24   History of CAD-status post heart cath in April 2024 with stent to the LAD.  Continue patient's cardiac home meds. h/o PCI with JENN to LAD in 2018; patent stent with non-occlusive CAD on cardiac catheterization from 4/24/24. Pacemaker interrogation reviewed which is normal  Parkinson disease-continue Sinemet and entacapone.  CKD-creatinine at baseline.  Monitor.  Essential hypertension-continue home medications.  History of COPD-not in acute exacerbation.  Type 2 diabetes-continue sliding scale.  Monitor  Elevated [id number] V/Q      Greater than 35 minutes spent on physical exam, charting, assessment and plan.  Vangie Oliva MD  11/25/2024  12:31 PM

## 2024-11-26 LAB — GLUCOSE BLD-MCNC: 148 MG/DL (ref 75–110)

## 2024-11-29 ENCOUNTER — TELEPHONE (OUTPATIENT)
Dept: FAMILY MEDICINE CLINIC | Age: 84
End: 2024-11-29

## 2024-11-29 NOTE — TELEPHONE ENCOUNTER
Care Transitions Initial Follow Up Call    Outreach made within 2 business days of discharge: Yes    Patient: Mina Gill Patient : 1940   MRN: [id number]  Reason for Admission: Weakness  Discharge Date: 24       Spoke with: Patient    Discharge department/facility: [facility] Interactive Patient Contact:  Was patient able to fill all prescriptions: Yes  Was patient instructed to bring all medications to the follow-up visit: Yes  Is patient taking all medications as directed in the discharge summary? Yes  Does patient understand their discharge instructions: Yes  Does patient have questions or concerns that need addressed prior to 7-14 day follow up office visit: no    Additional needs identified to be addressed with provider  No needs identified             Scheduled appointment with PCP within 7-14 days    Follow Up  Future Appointments   Date Time Provider Department Center   12/3/2024  3:30 PM Lucila Wilder MD [facility]   2024  9:20 AM SCHEDULE, [facility] ORTHO SPECIA [facility]   2025  3:00 PM Abraham Cash MD Neuro [facility] Neurology -   3/3/2025 10:40 AM Tisha Glasgow MD [facility] DEP   3/27/2025 10:45 AM Omid Stone MD [facility]   2025  1:15 PM Delfino Zazueta MD Resp Spec [facility]       Jacquelyn Starr [facility]

## 2024-12-02 ENCOUNTER — TELEPHONE (OUTPATIENT)
Dept: FAMILY MEDICINE CLINIC | Age: 84
End: 2024-12-02

## 2024-12-04 ENCOUNTER — OFFICE VISIT (OUTPATIENT)
Dept: ORTHOPEDIC SURGERY | Age: 84
End: 2024-12-04
Payer: MEDICARE

## 2024-12-04 VITALS — WEIGHT: 178 LBS | HEIGHT: 67 IN | BODY MASS INDEX: 27.94 KG/M2

## 2024-12-04 DIAGNOSIS — M17.12 PRIMARY OSTEOARTHRITIS OF LEFT KNEE: Primary | ICD-10-CM

## 2024-12-04 PROCEDURE — 1159F MED LIST DOCD IN RCRD: CPT | Performed by: STUDENT IN AN ORGANIZED HEALTH CARE EDUCATION/TRAINING PROGRAM

## 2024-12-04 PROCEDURE — 1123F ACP DISCUSS/DSCN MKR DOCD: CPT | Performed by: STUDENT IN AN ORGANIZED HEALTH CARE EDUCATION/TRAINING PROGRAM

## 2024-12-04 PROCEDURE — 1125F AMNT PAIN NOTED PAIN PRSNT: CPT | Performed by: STUDENT IN AN ORGANIZED HEALTH CARE EDUCATION/TRAINING PROGRAM

## 2024-12-04 PROCEDURE — 99213 OFFICE O/P EST LOW 20 MIN: CPT | Performed by: STUDENT IN AN ORGANIZED HEALTH CARE EDUCATION/TRAINING PROGRAM

## 2024-12-04 NOTE — PROGRESS NOTES
[facility]  [address]  [place] [address]  Dept: [phone]  Dept Fax: [phone]        Ambulatory   Follow Up    Subjective:   Mina Gill is a 84 year old male who presents to our office today for evaluation of his left knee pain.  Patient was last seen in our clinic on 9/25/2024 at that time he was given a corticosteroid injection and sent for physical therapy.  He however was unable to attend any formal physical therapy due to admission to a rehab facility for weakness diffusely.  He has been working with in-house therapy which she states has helped his knee pain.  He denies any new falls, trauma or other injuries to his knee and has had no new numbness, tingling or weakness.  Patient states that the injection gave him significant relief of his prior symptoms however did not completely relieve his pain.  Due to the history of Parkinson's, weakness, and prior cardiac history recommend continued nonoperative management for his osteoarthritis.  Patient may have a repeat injection mid January 2025.  Other than the knee pain he has no other orthopedic complaint or concern at this time.    Review of Systems   Constitutional: Negative for Fever and Chills.   HENT: Negative for Congestion.    Eyes: Negative for Blurred Vision and Double Vision.   Respiratory: Negative for Cough, Shortness of Breath and Wheezing.    Cardiovascular: Negative for Chest Pain and Palpitations.   Gastrointestinal: Negative for Nausea. Negative for Vomiting.   Musculoskeletal: Positive for Myalgias and Joint Pain (left knee).   Skin: Negative for Itching and Rash.   Neurological: Negative for Dizziness, Sensory Change and Headaches.   Psychiatric/Behavioral: Negative for Depression and Suicidal Ideas.     Objective:   General: AAOx3, NAD.  Ortho Exam  Neuro: Alert. Oriented.  Eyes: Extra-Ocular Muscles Intact.  Mouth: Oral Mucosa Moist. No Perioral

## 2024-12-16 ENCOUNTER — OFFICE VISIT (OUTPATIENT)
Dept: FAMILY MEDICINE CLINIC | Age: 84
End: 2024-12-16
Payer: MEDICARE

## 2024-12-16 VITALS
WEIGHT: 179.4 LBS | HEART RATE: 70 BPM | TEMPERATURE: 96.9 F | HEIGHT: 67 IN | OXYGEN SATURATION: 97 % | SYSTOLIC BLOOD PRESSURE: 98 MMHG | BODY MASS INDEX: 28.16 KG/M2 | DIASTOLIC BLOOD PRESSURE: 60 MMHG

## 2024-12-16 DIAGNOSIS — E11.22 TYPE 2 DIABETES MELLITUS WITH DIABETIC CHRONIC KIDNEY DISEASE, UNSPECIFIED CKD STAGE, UNSPECIFIED WHETHER LONG TERM INSULIN USE (HCC): Primary | ICD-10-CM

## 2024-12-16 DIAGNOSIS — G20.A1 PARKINSON'S DISEASE, UNSPECIFIED WHETHER DYSKINESIA PRESENT, UNSPECIFIED WHETHER MANIFESTATIONS FLUCTUATE (HCC): ICD-10-CM

## 2024-12-16 DIAGNOSIS — Z71.89 ACP (ADVANCE CARE PLANNING): ICD-10-CM

## 2024-12-16 LAB — HBA1C MFR BLD: 6 %

## 2024-12-16 PROCEDURE — 1126F AMNT PAIN NOTED NONE PRSNT: CPT

## 2024-12-16 PROCEDURE — 1123F ACP DISCUSS/DSCN MKR DOCD: CPT

## 2024-12-16 PROCEDURE — 3078F DIAST BP <80 MM HG: CPT

## 2024-12-16 PROCEDURE — 1159F MED LIST DOCD IN RCRD: CPT

## 2024-12-16 PROCEDURE — 3074F SYST BP LT 130 MM HG: CPT

## 2024-12-16 PROCEDURE — 3044F HG A1C LEVEL LT 7.0%: CPT

## 2024-12-16 PROCEDURE — 83036 HEMOGLOBIN GLYCOSYLATED A1C: CPT

## 2024-12-16 PROCEDURE — 99213 OFFICE O/P EST LOW 20 MIN: CPT

## 2024-12-16 RX ORDER — ESOMEPRAZOLE MAGNESIUM 20 MG/1
20 GRANULE, DELAYED RELEASE ORAL DAILY
COMMUNITY

## 2024-12-16 ASSESSMENT — COLUMBIA-SUICIDE SEVERITY RATING SCALE - C-SSRS
2. HAVE YOU ACTUALLY HAD ANY THOUGHTS OF KILLING YOURSELF?: NO
1. WITHIN THE PAST MONTH, HAVE YOU WISHED YOU WERE DEAD OR WISHED YOU COULD GO TO SLEEP AND NOT WAKE UP?: YES
6. HAVE YOU EVER DONE ANYTHING, STARTED TO DO ANYTHING, OR PREPARED TO DO ANYTHING TO END YOUR LIFE?: NO

## 2024-12-16 ASSESSMENT — ENCOUNTER SYMPTOMS
SHORTNESS OF BREATH: 0
DIARRHEA: 0
ABDOMINAL PAIN: 0
VOMITING: 0
COUGH: 0
ABDOMINAL DISTENTION: 0
NAUSEA: 0
STRIDOR: 0
BACK PAIN: 1

## 2024-12-16 ASSESSMENT — PATIENT HEALTH QUESTIONNAIRE - PHQ9
8. MOVING OR SPEAKING SO SLOWLY THAT OTHER PEOPLE COULD HAVE NOTICED. OR THE OPPOSITE, BEING SO FIGETY OR RESTLESS THAT YOU HAVE BEEN MOVING AROUND A LOT MORE THAN USUAL: NEARLY EVERY DAY
1. LITTLE INTEREST OR PLEASURE IN DOING THINGS: NOT AT ALL
9. THOUGHTS THAT YOU WOULD BE BETTER OFF DEAD, OR OF HURTING YOURSELF: NEARLY EVERY DAY
7. TROUBLE CONCENTRATING ON THINGS, SUCH AS READING THE NEWSPAPER OR WATCHING TELEVISION: NEARLY EVERY DAY
2. FEELING DOWN, DEPRESSED OR HOPELESS: MORE THAN HALF THE DAYS
SUM OF ALL RESPONSES TO PHQ QUESTIONS 1-9: 13
5. POOR APPETITE OR OVEREATING: NOT AT ALL
4. FEELING TIRED OR HAVING LITTLE ENERGY: SEVERAL DAYS
SUM OF ALL RESPONSES TO PHQ QUESTIONS 1-9: 10
SUM OF ALL RESPONSES TO PHQ9 QUESTIONS 1 & 2: 2
3. TROUBLE FALLING OR STAYING ASLEEP: NOT AT ALL
SUM OF ALL RESPONSES TO PHQ QUESTIONS 1-9: 13
6. FEELING BAD ABOUT YOURSELF - OR THAT YOU ARE A FAILURE OR HAVE LET YOURSELF OR YOUR FAMILY DOWN: SEVERAL DAYS
SUM OF ALL RESPONSES TO PHQ QUESTIONS 1-9: 13

## 2024-12-16 NOTE — PROGRESS NOTES
[facility] - Outpatient Note      Subjective:    Mina Gill is a 84 y.o. male with  has a past medical history of 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 ([facility]), CKD (chronic kidney disease) stage 2, GFR 60-89 ml/min, CKD (chronic kidney disease) stage 3, GFR 30-59 ml/min ([facility]), Diverticulosis of colon, GERD (gastroesophageal reflux disease), History of non-ST elevation myocardial infarction (NSTEMI) 6/2022, Impaired renal function, MRSA (methicillin resistant staph aureus) culture positive, Osteoarthritis of knee, Parkinson disease ([facility]), Proteinuria, Skull fracture, Temporary loss of eyesight, and Tubular adenoma of colon.    Presented to the office today for:  No chief complaint on file.      HPI  Patient is an 84-year-old male significant past medical history of CKD, COPD, complete heart block status post pacemaker placement and Parkinson's disease presenting to clinic for follow-up.  Patient was recently admitted to [facility] on August 2024.  Patient was placed in acute rehab facility afterwards.  Patient states his chronic right-sided knee pain, writer himself gave Kenalog injection and prior outpatient clinic visit.  Patient follows up with orthopedics, neurology and pain management.  Patient was recently discharged from acute rehab and currently back home.  Patient resides with friend/caretaker at home.  Patient was recently followed up with pulmonology who states his COPD is relatively well-controlled.  Patient states he still has a chronic pain on his left knee but it is intermittently a little exacerbated but so far doing well.  Denies any chest pain or shortness of breath denies any palpitations.  Patient does describe having sensations of dizziness especially in the morning going from the sitting to standing position.  Caretaker who is with patient also states she is concerned that he may become more depressed

## 2024-12-16 NOTE — PATIENT INSTRUCTIONS
Thank you for letting us take care of you today. We hope all your questions were addressed. If a question was overlooked or something else comes to mind after you return home, please contact a member of your Care Team listed below.      Your Care Team at [facility] is Team #2  Yony Marte M.D. (Faculty)  Jose Bella M.D. (Resident)  Lucila Wilder M.D. (Resident)  Tisha Glasgow M.D. (Resident)  Marzena Casanova M.D. (Resident)  Suzanne Dewey M.D. (Resident)  Antonio Starr, [facility]  Noemy Matias, [facility]  Nicolette Freeman,  [facility]  Bernadette Stewart, [facility]  Regina Kay, [facility]  Kaitlynn Toney, [facility]  Lolis Hodgson (LJ) Ashley BHAVNA (Clinical Practice Manager)  Mavis Ball formerly [facility] (Clinical Pharmacist)     Office phone number: [phone]    If you need to get in right away due to illness, please be advised we have \"Same Day\" appointments available Monday-Friday. Please call us at [phone] option #3 to schedule your \"Same Day\" appointment.      Advance Care Planning     Advance Care Planning opens a door to talk about and write down your wishes before a sudden accident or illness.  Make your goals, values, and preferences known.     This puts you in the ’s seat and helps others know what matters most to you so they won’t have to guess.      Where can you learn more?    Go to [email or web address]   to learn how to:    Name someone you trust to make healthcare decisions for you, only if you can’t. (Healthcare Power of )    Document your wishes for care if you were seriously ill and not expected to recover or are approaching end of life. (Advance Directive or Living Will)    The same page can be found using the QR code below.

## 2024-12-16 NOTE — PROGRESS NOTES
Visit Information    Have you changed or started any medications since your last visit including any over-the-counter medicines, vitamins, or herbal medicines? no   Have you stopped taking any of your medications? Is so, why? -  no  Are you having any side effects from any of your medications? - no    Have you seen any other physician or provider since your last visit?  yes - [facility]   Have you had any other diagnostic tests since your last visit?  no   Have you been seen in the emergency room and/or had an admission in a hospital since we last saw you?  no   Have you had your routine dental cleaning in the past 6 months?  no     Do you have an active MyChart account? If no, what is the barrier?  No: pending    Patient Care Team:  Tisha Glasgow MD as PCP - General (Family Medicine)  Maria Elena Farooq MD (Neurology)  Felisa John MD (Nephrology)  Bo Lindsey MD as Consulting Physician (Cardiovascular Disease)  Ross Garcia MD as Consulting Physician (Gastroenterology)  Slime Baker APRN - CNP as Nurse Practitioner (Nurse Practitioner)  Chuy Bailey MD as Consulting Physician (Pain Management)  Ramon Milan APRN - CNP as Nurse Practitioner (Pulmonology)    Medical History Review  Past Medical, Family, and Social History reviewed and does not contribute to the patient presenting condition    Health Maintenance   Topic Date Due    Shingles vaccine (1 of 2) Never done    Respiratory Syncytial Virus (RSV) Pregnant or age 60 yrs+ (1 - 1-dose 75+ series) Never done    Colorectal Cancer Screen  09/19/2018    Hepatitis B vaccine (2 of 3 - 19+ 3-dose series) 12/14/2023    Flu vaccine (1) 08/01/2024    COVID-19 Vaccine (6 - 2023-24 season) 09/01/2024    Lipids  12/15/2024    Depression Monitoring  09/09/2025    DTaP/Tdap/Td vaccine (4 - Td or Tdap) 11/16/2033    Annual Wellness Visit (Medicare Advantage)  Completed    Pneumococcal 65+ years Vaccine  Completed    Hepatitis A vaccine

## 2024-12-16 NOTE — PROGRESS NOTES
Attending Physician Statement  I have discussed the care of LuisGuerraincluding pertinent history and exam findings,  with the resident. I have reviewed the key elements of all parts of the encounter with the resident.  I agree with the assessment, plan and orders as documented by the resident.  (GE Modifier)    S/P Pace maker- Complete heart block  FU from hospital- Gen weakness-home PT/OT/   Parkinson's disease- swallow study per speech  Dizziness- low BP- DC Amlodipine  ACP- refer to Palliative care

## 2024-12-17 ENCOUNTER — TELEPHONE (OUTPATIENT)
Dept: FAMILY MEDICINE CLINIC | Age: 84
End: 2024-12-17

## 2024-12-17 NOTE — TELEPHONE ENCOUNTER
Mara Briones went to visit patient, but was unable to reconcile medication as was locked in caregiver room.  Will do this at patient next visit. Patient takes medication  at 12 and 6 pm but medication not all present due to mediation being locked in the room.

## 2024-12-20 ENCOUNTER — TELEPHONE (OUTPATIENT)
Dept: FAMILY MEDICINE CLINIC | Age: 84
End: 2024-12-20

## 2024-12-20 NOTE — TELEPHONE ENCOUNTER
Writer left druil to inform patient that the home health referral was sent to [facility]. Patient looks like may already follow with [facility]. Writer left  to confirm. Writer also provided the contact number of [facility] by [employer],  [phone]

## 2025-01-16 ENCOUNTER — HOSPITAL ENCOUNTER (OUTPATIENT)
Dept: PAIN MANAGEMENT | Age: 85
Discharge: HOME | End: 2025-01-16
Payer: MEDICARE

## 2025-01-16 VITALS — BODY MASS INDEX: 28.09 KG/M2 | HEIGHT: 67 IN | WEIGHT: 179 LBS

## 2025-01-16 DIAGNOSIS — M48.061 SPINAL STENOSIS OF LUMBAR REGION WITHOUT NEUROGENIC CLAUDICATION: Primary | ICD-10-CM

## 2025-01-16 DIAGNOSIS — M25.50 CHRONIC PAIN OF MULTIPLE JOINTS: Chronic | ICD-10-CM

## 2025-01-16 DIAGNOSIS — R69 MULTIPLE COMORBID CONDITIONS: Chronic | ICD-10-CM

## 2025-01-16 DIAGNOSIS — M47.817 LUMBOSACRAL SPONDYLOSIS WITHOUT MYELOPATHY: Chronic | ICD-10-CM

## 2025-01-16 DIAGNOSIS — M17.0 PRIMARY OSTEOARTHRITIS OF BOTH KNEES: Chronic | ICD-10-CM

## 2025-01-16 DIAGNOSIS — G89.29 CHRONIC PAIN OF MULTIPLE JOINTS: Chronic | ICD-10-CM

## 2025-01-16 PROCEDURE — 99213 OFFICE O/P EST LOW 20 MIN: CPT | Performed by: ANESTHESIOLOGY

## 2025-01-16 PROCEDURE — 99213 OFFICE O/P EST LOW 20 MIN: CPT

## 2025-01-16 RX ORDER — LIDOCAINE 50 MG/G
1 PATCH TOPICAL DAILY
Qty: 30 PATCH | Refills: 3 | Status: SHIPPED | OUTPATIENT
Start: 2025-01-16 | End: 2025-05-16

## 2025-01-16 ASSESSMENT — ENCOUNTER SYMPTOMS
BACK PAIN: 1
GASTROINTESTINAL NEGATIVE: 1
RESPIRATORY NEGATIVE: 1

## 2025-01-16 ASSESSMENT — PAIN SCALES - GENERAL: PAINLEVEL_OUTOF10: 8

## 2025-01-16 NOTE — PROGRESS NOTES
The patient is a 84 y.o.Non- / non  male.    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 (HCC) 3/18/2011    d/t being hurt on the job    CKD (chronic kidney disease) stage 2, GFR 60-89 ml/min     CKD (chronic kidney disease) stage 3, GFR 30-59 ml/min (Formerly [facility])     Diverticulosis of colon     GERD (gastroesophageal reflux disease)     History of non-ST elevation myocardial infarction (NSTEMI) 6/2022 10/27/2022    Impaired renal function     MRSA (methicillin resistant staph aureus) culture positive 9/23/2015    leg    Osteoarthritis of knee     Left    Parkinson disease (Formerly [facility])     Proteinuria     Skull fracture 3/18/2011    d/t 12-foot drop on the job    Temporary loss of eyesight     periodically, happened x7    Tubular adenoma of colon 2012, 2017    mulitple        Past Surgical History:   Procedure Laterality Date    CARDIAC PROCEDURE N/A 4/2/2024    ali / Left heart cath / coronary angiography performed by Lobito Tracy MD at [facility] CARDIAC CATH LAB    CARDIAC PROCEDURE N/A 8/3/2024    Insert temporary pacemaker performed by Guerita Jeffrey MD at [facility] CARDIAC CATH LAB    CHOLECYSTECTOMY, LAPAROSCOPIC N/A 10/29/2022    LAPROSCOPIC CHOLECYSTECTOMY performed by Cj Valencia DO at [facility] OR    COLONOSCOPY  11/02/2012    poor prep, tubular adenoma    COLONOSCOPY  09/19/2017    diverticulosis, multiple polyps as described, spot marking done, pathology-tubular adenoma x 4    EP DEVICE PROCEDURE N/A 8/7/2024    you / ppm

## 2025-01-24 ENCOUNTER — TELEPHONE (OUTPATIENT)
Dept: NEUROLOGY | Age: 85
End: 2025-01-24

## 2025-01-24 NOTE — TELEPHONE ENCOUNTER
Peri from [facility]([phone]) called and stated she tried to reach pt domestic partner and left 2 vm and called pt son Delvin and left 3 vm to schedule patient.

## 2025-02-07 DIAGNOSIS — K20.90 ESOPHAGITIS: ICD-10-CM

## 2025-02-10 RX ORDER — GABAPENTIN 100 MG/1
CAPSULE ORAL
Qty: 30 CAPSULE | Refills: 1 | Status: SHIPPED | OUTPATIENT
Start: 2025-02-10 | End: 2025-04-11

## 2025-02-10 RX ORDER — ISOSORBIDE MONONITRATE 30 MG/1
30 TABLET, EXTENDED RELEASE ORAL DAILY
Qty: 90 TABLET | Refills: 4 | Status: SHIPPED | OUTPATIENT
Start: 2025-02-10

## 2025-02-10 RX ORDER — PANTOPRAZOLE SODIUM 20 MG/1
20 TABLET, DELAYED RELEASE ORAL DAILY
Qty: 90 TABLET | Refills: 4 | Status: SHIPPED | OUTPATIENT
Start: 2025-02-10

## 2025-02-10 RX ORDER — FUROSEMIDE 40 MG/1
TABLET ORAL
Qty: 30 TABLET | Refills: 4 | Status: SHIPPED | OUTPATIENT
Start: 2025-02-10

## 2025-02-10 NOTE — TELEPHONE ENCOUNTER
obstruction     Bandemia     Pulmonary nodule     MRSA carrier     Acute cholecystitis     TAMARA (acute kidney injury) (HCC)     History of non-ST elevation myocardial infarction (NSTEMI) 6/2022     Postoperative retention of urine     History of coronary artery stent placement     Nausea & vomiting     Intractable nausea and vomiting     Epigastric pain     Hyperemesis     Esophagitis     Chest pain     Dysphagia     Failure to thrive in adult     Unable to care for self     Intractable abdominal pain     Unable to walk     Rhabdomyolysis     Acute gastritis without bleeding     Benign prostatic hyperplasia with lower urinary tract symptoms     Calculus of gallbladder without cholecystitis without obstruction     Hypervolemia     Combined rheumatic disorders of mitral, aortic and tricuspid valves     Diaphragmatic hernia without obstruction or gangrene     Diverticulosis of intestine, part unspecified, without perforation or abscess without bleeding     Hypertensive heart and chronic kidney disease with heart failure and stage 1 through stage 4 chronic kidney disease, or unspecified chronic kidney disease (HCC)     Renal insufficiency syndrome     Other retention of urine     Other specific arthropathies, not elsewhere classified, other specified site     Other specified diseases of pancreas     Presence of coronary angioplasty implant and graft     Type 2 diabetes mellitus with chronic kidney disease (HCC)     Type 2 diabetes mellitus with hyperglycemia (HCC)     Primary osteoarthritis of left knee     Unspecified diastolic (congestive) heart failure (HCC)     Primary osteoarthritis of both knees     Abnormal stress test     Progressive angina (HCC)     Atypical chest pain     ACS (acute coronary syndrome) (HCC)     Complete heart block (HCC)     Pneumonia of right lung due to infectious organism     Acute kidney injury superimposed on CKD (HCC)     Acute heart failure (HCC)     Biventricular congestive heart

## 2025-02-11 ENCOUNTER — OFFICE VISIT (OUTPATIENT)
Dept: NEUROLOGY | Age: 85
End: 2025-02-11

## 2025-02-11 VITALS
BODY MASS INDEX: 28.88 KG/M2 | WEIGHT: 184 LBS | HEART RATE: 92 BPM | SYSTOLIC BLOOD PRESSURE: 122 MMHG | DIASTOLIC BLOOD PRESSURE: 73 MMHG | HEIGHT: 67 IN

## 2025-02-11 DIAGNOSIS — G20.A1 PARKINSON'S DISEASE, UNSPECIFIED WHETHER DYSKINESIA PRESENT, UNSPECIFIED WHETHER MANIFESTATIONS FLUCTUATE (HCC): ICD-10-CM

## 2025-02-11 DIAGNOSIS — K11.7 DROOLING: ICD-10-CM

## 2025-02-11 DIAGNOSIS — G43.009 MIGRAINE WITHOUT AURA AND WITHOUT STATUS MIGRAINOSUS, NOT INTRACTABLE: ICD-10-CM

## 2025-02-11 DIAGNOSIS — G25.2 RESTING TREMOR: ICD-10-CM

## 2025-02-11 DIAGNOSIS — G20.A1 PARKINSON'S DISEASE WITHOUT FLUCTUATING MANIFESTATIONS, UNSPECIFIED WHETHER DYSKINESIA PRESENT (HCC): Primary | ICD-10-CM

## 2025-02-11 DIAGNOSIS — G44.329 CHRONIC POST-TRAUMATIC HEADACHE, NOT INTRACTABLE: ICD-10-CM

## 2025-02-11 RX ORDER — UBROGEPANT 50 MG/1
50 TABLET ORAL
Qty: 30 TABLET | Refills: 2 | Status: SHIPPED | OUTPATIENT
Start: 2025-02-11 | End: 2025-05-12

## 2025-02-11 RX ORDER — ROPINIROLE 0.5 MG/1
1 TABLET, FILM COATED ORAL 3 TIMES DAILY
Qty: 90 TABLET | Refills: 4 | Status: SHIPPED | OUTPATIENT
Start: 2025-02-11

## 2025-02-11 RX ORDER — AMLODIPINE BESYLATE 5 MG/1
TABLET ORAL
COMMUNITY
Start: 2024-12-20

## 2025-02-11 NOTE — PROGRESS NOTES
[address] [facility], [address] [address] [facility] [address]  [place] [address]  P: [phone]  F: [phone]    NEUROLOGY CLINIC NOTE     PATIENT NAME: Mina Gill  PATIENT MRN: [id number]  PRIMARY CARE PHYSICIAN: Tisha Glasgow MD    HPI:      Mina Gill is a 84 y.o. male with PMHx of head trauma 3/2011, SDH conservatively treated, HTN, CKD, osteoarthritis of knee, esophagitis, CAD, Parkinson disease, who presented to clinic for follow up for Headaches and Parkinson's disease     History obtained from Patient and care giver     Initial visit was on 2/28/2021 at the age of 80 and was seen in the clinic for headaches. 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 History of cervical spondylosis with cervical fusion at C5-C6. He was following with our clinic for headaches and was known to have Parkinson's disease for which he follows with Dr. Arredondo. I can't find medical records although they seemed to be requested in the past. Patient wheelchair-bound at some point, but now is using a walker.  No cranial nerve deficits.  Flat face with mild tongue tremor.  No hearing difficulty.  Bilateral resting tremor. Cogwheel rigidity present.  Slow shuffling gait and positive retropulsion test.     An MRI brain showed generalized atrophy with bilateral chronic periventricular small vessel ischemia. I don't see any Datscan being performed. Seems like he was given a diagnosis of parkinson's clinically with the use of Sentiment     He was on sinemet  mg QID (7:30 AM,12 PM,4 PM, and bedtime) but continued to show symptoms and was started on entacapone 200 mg QID with sinemet doses. Somewhere between 2023 patient was started on Requip.  He was then see at  when neurology were consulted for dysphagia and at that time Requip was increase to 0.5 mg BID.     Office visit:  02/12/25

## 2025-02-14 ENCOUNTER — TELEPHONE (OUTPATIENT)
Dept: NEUROLOGY | Age: 85
End: 2025-02-14

## 2025-02-14 NOTE — TELEPHONE ENCOUNTER
Faxed rcvd from [facility], stating that they were unable to connect with patient to schedule an appt from the referral that was sent.     Fax scanned to media.

## 2025-02-26 ENCOUNTER — TELEPHONE (OUTPATIENT)
Dept: FAMILY MEDICINE CLINIC | Age: 85
End: 2025-02-26

## 2025-02-26 ENCOUNTER — OFFICE VISIT (OUTPATIENT)
Dept: ORTHOPEDIC SURGERY | Age: 85
End: 2025-02-26

## 2025-02-26 DIAGNOSIS — M17.12 PRIMARY OSTEOARTHRITIS OF LEFT KNEE: Primary | ICD-10-CM

## 2025-02-26 RX ORDER — METHYLPREDNISOLONE ACETATE 80 MG/ML
80 INJECTION, SUSPENSION INTRA-ARTICULAR; INTRALESIONAL; INTRAMUSCULAR; SOFT TISSUE ONCE
Status: COMPLETED | OUTPATIENT
Start: 2025-02-26 | End: 2025-02-26

## 2025-02-26 RX ORDER — BUPIVACAINE HYDROCHLORIDE 2.5 MG/ML
2 INJECTION, SOLUTION INFILTRATION; PERINEURAL ONCE
Status: COMPLETED | OUTPATIENT
Start: 2025-02-26 | End: 2025-02-26

## 2025-02-26 RX ADMIN — METHYLPREDNISOLONE ACETATE 80 MG: 80 INJECTION, SUSPENSION INTRA-ARTICULAR; INTRALESIONAL; INTRAMUSCULAR; SOFT TISSUE at 09:50

## 2025-02-26 RX ADMIN — BUPIVACAINE HYDROCHLORIDE 2 ML: 2.5 INJECTION, SOLUTION INFILTRATION; PERINEURAL at 09:50

## 2025-02-26 NOTE — PROGRESS NOTES
[facility]  [address]  [place] [address]  Dept: [phone]  Dept Fax: [phone]        Ambulatory   Follow Up    Subjective:   Mina Gill is a 84 year old  male who presents to our office today for evaluation of their left knee pain.  Patient was persistently seen in our office on 12/4/2024 for follow-up and received corticosteroid injection last on 9/25/2024.  At past for visits we elected to proceed with conservative treatment of left knee osteoarthritis due to patient's history of Parkinson's, weakness and prior cardiac history.    Patient states that he has been doing home physical therapy which has given him great improvement.  He states that his last injection lasted about a month or 2.  He is wanting another left knee injection at this time.  He denies any new injuries to this.  He is using a cane for ambulation mostly.    Review of Systems   Constitutional: Negative for Fever and Chills.   HENT: Negative for Congestion.    Eyes: Negative for Blurred Vision and Double Vision.   Respiratory: Negative for Cough, Shortness of Breath and Wheezing.    Cardiovascular: Negative for Chest Pain and Palpitations.   Gastrointestinal: Negative for Nausea. Negative for Vomiting.   Musculoskeletal: Positive for Myalgias and Joint Pain (left knee).   Skin: Negative for Itching and Rash.   Neurological: Negative for Dizziness, Sensory Change and Headaches.   Psychiatric/Behavioral: Negative for Depression and Suicidal Ideas.     Objective:   General: AAOx3, NAD.  Ortho Exam  Neuro: Alert. Oriented.  Eyes: Extra-Ocular Muscles Intact.  Mouth: Oral Mucosa Moist. No Perioral Lesions.  Pulm: Respirations Unlabored and Regular.  Skin: Warm, Well Perfused.  Psych: Patient has good fund of knowledge and displays understanding of Exam, Diagnosis, and Plan.  MSK:   LLE: Skin intact.  ROM of knee 5-130 degrees.  Stable to varus and valgus

## 2025-02-27 ENCOUNTER — OFFICE VISIT (OUTPATIENT)
Dept: FAMILY MEDICINE CLINIC | Age: 85
End: 2025-02-27

## 2025-02-27 VITALS
HEIGHT: 67 IN | WEIGHT: 182 LBS | HEART RATE: 84 BPM | BODY MASS INDEX: 28.56 KG/M2 | SYSTOLIC BLOOD PRESSURE: 98 MMHG | DIASTOLIC BLOOD PRESSURE: 62 MMHG

## 2025-02-27 DIAGNOSIS — G20.B2 PARKINSON'S DISEASE WITH DYSKINESIA AND FLUCTUATING MANIFESTATIONS (HCC): Primary | ICD-10-CM

## 2025-02-27 DIAGNOSIS — G44.329 CHRONIC POST-TRAUMATIC HEADACHE, NOT INTRACTABLE: ICD-10-CM

## 2025-02-27 ASSESSMENT — PATIENT HEALTH QUESTIONNAIRE - PHQ9
6. FEELING BAD ABOUT YOURSELF - OR THAT YOU ARE A FAILURE OR HAVE LET YOURSELF OR YOUR FAMILY DOWN: NOT AT ALL
2. FEELING DOWN, DEPRESSED OR HOPELESS: NEARLY EVERY DAY
SUM OF ALL RESPONSES TO PHQ QUESTIONS 1-9: 15
9. THOUGHTS THAT YOU WOULD BE BETTER OFF DEAD, OR OF HURTING YOURSELF: NOT AT ALL
1. LITTLE INTEREST OR PLEASURE IN DOING THINGS: NEARLY EVERY DAY
SUM OF ALL RESPONSES TO PHQ QUESTIONS 1-9: 15
5. POOR APPETITE OR OVEREATING: NOT AT ALL
SUM OF ALL RESPONSES TO PHQ QUESTIONS 1-9: 15
7. TROUBLE CONCENTRATING ON THINGS, SUCH AS READING THE NEWSPAPER OR WATCHING TELEVISION: MORE THAN HALF THE DAYS
8. MOVING OR SPEAKING SO SLOWLY THAT OTHER PEOPLE COULD HAVE NOTICED. OR THE OPPOSITE, BEING SO FIGETY OR RESTLESS THAT YOU HAVE BEEN MOVING AROUND A LOT MORE THAN USUAL: SEVERAL DAYS
10. IF YOU CHECKED OFF ANY PROBLEMS, HOW DIFFICULT HAVE THESE PROBLEMS MADE IT FOR YOU TO DO YOUR WORK, TAKE CARE OF THINGS AT HOME, OR GET ALONG WITH OTHER PEOPLE: SOMEWHAT DIFFICULT
SUM OF ALL RESPONSES TO PHQ QUESTIONS 1-9: 15
4. FEELING TIRED OR HAVING LITTLE ENERGY: NEARLY EVERY DAY
SUM OF ALL RESPONSES TO PHQ9 QUESTIONS 1 & 2: 6
3. TROUBLE FALLING OR STAYING ASLEEP: NEARLY EVERY DAY

## 2025-02-27 ASSESSMENT — ENCOUNTER SYMPTOMS
COUGH: 0
ABDOMINAL PAIN: 0

## 2025-02-27 NOTE — PROGRESS NOTES
Attending Physician Statement  I  have discussed the care of Mina Gill including pertinent history and exam findings with the resident. I agree with the assessment, plan and orders as documented by the resident.      BP 98/62 (Site: Right Upper Arm, Position: Sitting, Cuff Size: Medium Adult)   Pulse 84   Ht 1.702 m (5' 7.01\")   Wt 82.6 kg (182 lb)   BMI 28.50 kg/m²    BP Readings from Last 3 Encounters:   02/27/25 98/62   02/11/25 122/73   12/16/24 98/60     Wt Readings from Last 3 Encounters:   02/27/25 82.6 kg (182 lb)   02/11/25 83.5 kg (184 lb)   01/16/25 81.2 kg (179 lb)          Diagnosis Orders   1. Parkinson's disease with dyskinesia and fluctuating manifestations (HCC)        2. Chronic post-traumatic headache, not intractable              Chris Love DO 2/28/2025 9:50 AM      
Visit Information    Have you changed or started any medications since your last visit including any over-the-counter medicines, vitamins, or herbal medicines? no   Are you having any side effects from any of your medications? -  no  Have you stopped taking any of your medications? Is so, why? -  no    Have you seen any other physician or provider since your last visit? No  Have you had any other diagnostic tests since your last visit? No  Have you been seen in the emergency room and/or had an admission to a hospital since we last saw you? No  Have you had your routine dental cleaning in the past 6 months? no    Have you activated your [employer] account? If not, what are your barriers? Yes     Patient Care Team:  Tisha Glasgow MD as PCP - General (Family Medicine)  Maria Elena Farooq MD (Neurology)  Felisa John MD (Nephrology)  Bo Lindsey MD as Consulting Physician (Cardiovascular Disease)  Ross Garcia MD as Consulting Physician (Gastroenterology)  Slime Baker APRN - CNP as Nurse Practitioner (Nurse Practitioner)  Chuy Bailey MD as Consulting Physician (Pain Management)  Ramon Milan APRN - CNP as Nurse Practitioner (Pulmonology)    Medical History Review  Past Medical, Family, and Social History reviewed and does not contribute to the patient presenting condition    Health Maintenance   Topic Date Due    Shingles vaccine (1 of 2) Never done    Respiratory Syncytial Virus (RSV) Pregnant or age 60 yrs+ (1 - 1-dose 75+ series) Never done    Hepatitis B vaccine (2 of 3 - 19+ 3-dose series) 12/14/2023    Flu vaccine (1) 08/01/2024    COVID-19 Vaccine (6 - 2024-25 season) 09/01/2024    Lipids  12/15/2024    Annual Wellness Visit (Medicare Advantage)  01/01/2025    Depression Monitoring  12/16/2025    Colorectal Cancer Screen  09/19/2027    DTaP/Tdap/Td vaccine (4 - Td or Tdap) 11/16/2033    Pneumococcal 50+ years Vaccine  Completed    Hepatitis A vaccine  Aged Out    Hib vaccine  
consider going to [facility].     2. Chronic post-traumatic headache, not intractable  Had a very long discussion today about sleep hygiene.  Patient drinks multiple cups of coffee even in the middle of the night as well as using both TV and phone in his bed. Caregiver can confirm this. Pt is willing to try modifying these factors first before increasing his melatonin which I agree with.   Due to his hx, a lot of medications are contraindicated. He is to continue Mag-px, and will need to call neurology to see if ubrelvy is approved. Neurology has been trying to approve this since their last visit on 2/11/25. They will also consider botox for him if this is not approved.  Pt understands and agrees with plan.          Requested Prescriptions      No prescriptions requested or ordered in this encounter       There are no discontinued medications.    Mina received counseling on the following healthy behaviors: nutrition, exercise and medication adherence    Discussed use,benefit, and side effects of prescribed medications.  Barriers to medication compliance addressed.      All patient questions answered.  Pt voiced understanding.     No follow-ups on file.        Disclaimer: Some orall of this note was transcribed using voice-recognition software.This may cause typographical errors occasionally. Although all effort is made to fix these errors, please do not hesitate to contact our office if there isany concern with the understanding of this note.

## 2025-02-27 NOTE — PATIENT INSTRUCTIONS
Thank you for letting us take care of you today. We hope all your questions were addressed. If a question was overlooked or something else comes to mind after you return home, please contact a member of your Care Team listed below.      Your Care Team at [facility] is Team #2  Yony Marte M.D. (Faculty)  Jose Bella M.D. (Resident)  Lucila Wilder M.D. (Resident)  Tisha Glasgow M.D. (Resident)  Marzena Casanova M.D. (Resident)  Suzanne Dewey M.D. (Resident)  Antonio Starr, [facility]  Noemy Matias, [facility]  Nicolette Freeman,  [facility]  Bernadette Stewart, [facility]  Regina Kay, [facility]  Kaitlynn Toney, [facility]  Lolis Hodgson (LJ) [facility] (Clinical Practice Manager)  Mavis Ball [facility] (Clinical Pharmacist)     Office phone number: [phone]    If you need to get in right away due to illness, please be advised we have \"Same Day\" appointments available Monday-Friday. Please call us at [phone] option #3 to schedule your \"Same Day\" appointment.

## 2025-03-05 NOTE — PLAN OF CARE
Problem: Discharge Planning  Goal: Discharge to home or other facility with appropriate resources  3/5/2025 0807 by Monica Mead RN  Outcome: Progressing  3/5/2025 0043 by Joceline Mascorro RN  Outcome: Progressing     Problem: Pain  Goal: Verbalizes/displays adequate comfort level or baseline comfort level  3/5/2025 0807 by Monica Mead RN  Outcome: Progressing  3/5/2025 0043 by Joceline Mascorro RN  Outcome: Progressing     Problem: Safety - Adult  Goal: Free from fall injury  3/5/2025 0807 by Monica Mead RN  Outcome: Progressing  3/5/2025 0043 by Joceline Mascorro RN  Outcome: Progressing     Problem: ABCDS Injury Assessment  Goal: Absence of physical injury  3/5/2025 0807 by Monica Mead RN  Outcome: Progressing  3/5/2025 0043 by Joceline Mascorro RN  Outcome: Progressing     Problem: Skin/Tissue Integrity  Goal: Skin integrity remains intact  Description: 1.  Monitor for areas of redness and/or skin breakdown  2.  Assess vascular access sites hourly  3.  Every 4-6 hours minimum:  Change oxygen saturation probe site  4.  Every 4-6 hours:  If on nasal continuous positive airway pressure, respiratory therapy assess nares and determine need for appliance change or resting period  3/5/2025 0807 by Monica Mead RN  Outcome: Progressing  3/5/2025 0043 by Joceline Mascorro RN  Outcome: Progressing  Flowsheets  Taken 3/4/2025 1606 by Monica Mead RN  Skin Integrity Remains Intact: Monitor for areas of redness and/or skin breakdown  Taken 3/4/2025 1246 by Monica Mead RN  Skin Integrity Remains Intact: Monitor for areas of redness and/or skin breakdown     Problem: Nutrition Deficit:  Goal: Optimize nutritional status  3/5/2025 0807 by Monica Mead RN  Outcome: Progressing  3/5/2025 0043 by Joceline Mascorro RN  Outcome: Progressing  Flowsheets (Taken 3/4/2025 1313 by Bonita Perry, RD, LD)  Nutrient intake appropriate for improving, restoring, or maintaining  Problem: Discharge Planning  Goal: Discharge to home or other facility with appropriate resources  Outcome: Progressing     Problem: Pain  Goal: Verbalizes/displays adequate comfort level or baseline comfort level  Outcome: Progressing     Problem: Chronic Conditions and Co-morbidities  Goal: Patient's chronic conditions and co-morbidity symptoms are monitored and maintained or improved  Outcome: Progressing     Problem: Safety - Adult  Goal: Free from fall injury  Outcome: Progressing     Problem: ABCDS Injury Assessment  Goal: Absence of physical injury  Outcome: Progressing

## 2025-03-06 ENCOUNTER — HOSPITAL ENCOUNTER (OUTPATIENT)
Age: 85
Setting detail: OBSERVATION
Discharge: HOME | End: 2025-03-07
Attending: STUDENT IN AN ORGANIZED HEALTH CARE EDUCATION/TRAINING PROGRAM | Admitting: EMERGENCY MEDICINE
Payer: COMMERCIAL

## 2025-03-06 ENCOUNTER — APPOINTMENT (OUTPATIENT)
Dept: GENERAL RADIOLOGY | Age: 85
End: 2025-03-06
Payer: COMMERCIAL

## 2025-03-06 DIAGNOSIS — R53.1 GENERALIZED WEAKNESS: Primary | ICD-10-CM

## 2025-03-06 DIAGNOSIS — L03.115 CELLULITIS OF RIGHT LOWER EXTREMITY: ICD-10-CM

## 2025-03-06 LAB
ANION GAP SERPL CALCULATED.3IONS-SCNC: 14 MMOL/L (ref 9–16)
BASOPHILS # BLD: 0.03 K/UL (ref 0–0.2)
BASOPHILS NFR BLD: 0 % (ref 0–2)
BILIRUB UR QL STRIP: NEGATIVE
BUN SERPL-MCNC: 20 MG/DL (ref 8–23)
CALCIUM SERPL-MCNC: 9.2 MG/DL (ref 8.6–10.4)
CHLORIDE SERPL-SCNC: 94 MMOL/L (ref 98–107)
CLARITY UR: CLEAR
CO2 SERPL-SCNC: 21 MMOL/L (ref 20–31)
COLOR UR: YELLOW
COMMENT: ABNORMAL
CREAT SERPL-MCNC: 1.4 MG/DL (ref 0.7–1.2)
EOSINOPHIL # BLD: 0.16 K/UL (ref 0–0.44)
EOSINOPHILS RELATIVE PERCENT: 2 % (ref 1–4)
ERYTHROCYTE [DISTWIDTH] IN BLOOD BY AUTOMATED COUNT: 13.1 % (ref 11.8–14.4)
EST. AVERAGE GLUCOSE BLD GHB EST-MCNC: 194 MG/DL
GFR, ESTIMATED: 49 ML/MIN/1.73M2
GLUCOSE BLD-MCNC: 266 MG/DL (ref 75–110)
GLUCOSE SERPL-MCNC: 338 MG/DL (ref 74–99)
GLUCOSE UR STRIP-MCNC: ABNORMAL MG/DL
HBA1C MFR BLD: 8.4 % (ref 4–6)
HCT VFR BLD AUTO: 45.6 % (ref 40.7–50.3)
HGB BLD-MCNC: 16 G/DL (ref 13–17)
HGB UR QL STRIP.AUTO: NEGATIVE
IMM GRANULOCYTES # BLD AUTO: 0.06 K/UL (ref 0–0.3)
IMM GRANULOCYTES NFR BLD: 1 %
KETONES UR STRIP-MCNC: NEGATIVE MG/DL
LEUKOCYTE ESTERASE UR QL STRIP: NEGATIVE
LYMPHOCYTES NFR BLD: 1.78 K/UL (ref 1.1–3.7)
LYMPHOCYTES RELATIVE PERCENT: 26 % (ref 24–43)
MCH RBC QN AUTO: 32.6 PG (ref 25.2–33.5)
MCHC RBC AUTO-ENTMCNC: 35.1 G/DL (ref 28.4–34.8)
MCV RBC AUTO: 92.9 FL (ref 82.6–102.9)
MONOCYTES NFR BLD: 0.5 K/UL (ref 0.1–1.2)
MONOCYTES NFR BLD: 7 % (ref 3–12)
NEUTROPHILS NFR BLD: 64 % (ref 36–65)
NEUTS SEG NFR BLD: 4.26 K/UL (ref 1.5–8.1)
NITRITE UR QL STRIP: NEGATIVE
NRBC BLD-RTO: 0 PER 100 WBC
PH UR STRIP: 7 [PH] (ref 5–8)
PLATELET # BLD AUTO: 201 K/UL (ref 138–453)
PMV BLD AUTO: 8.9 FL (ref 8.1–13.5)
POTASSIUM SERPL-SCNC: 4.1 MMOL/L (ref 3.7–5.3)
PROT UR STRIP-MCNC: NEGATIVE MG/DL
RBC # BLD AUTO: 4.91 M/UL (ref 4.21–5.77)
SODIUM SERPL-SCNC: 129 MMOL/L (ref 136–145)
SP GR UR STRIP: 1.01 (ref 1–1.03)
TROPONIN I SERPL HS-MCNC: 25 NG/L (ref 0–22)
UROBILINOGEN UR STRIP-ACNC: NORMAL EU/DL (ref 0–1)
WBC OTHER # BLD: 6.8 K/UL (ref 3.5–11.3)

## 2025-03-06 PROCEDURE — 6360000002 HC RX W HCPCS: Performed by: LICENSED PRACTICAL NURSE

## 2025-03-06 PROCEDURE — 99285 EMERGENCY DEPT VISIT HI MDM: CPT

## 2025-03-06 PROCEDURE — 71045 X-RAY EXAM CHEST 1 VIEW: CPT

## 2025-03-06 PROCEDURE — 93005 ELECTROCARDIOGRAM TRACING: CPT

## 2025-03-06 PROCEDURE — 2500000003 HC RX 250 WO HCPCS

## 2025-03-06 PROCEDURE — 83036 HEMOGLOBIN GLYCOSYLATED A1C: CPT

## 2025-03-06 PROCEDURE — 6370000000 HC RX 637 (ALT 250 FOR IP)

## 2025-03-06 PROCEDURE — 2580000003 HC RX 258

## 2025-03-06 PROCEDURE — 82947 ASSAY GLUCOSE BLOOD QUANT: CPT

## 2025-03-06 PROCEDURE — 96374 THER/PROPH/DIAG INJ IV PUSH: CPT

## 2025-03-06 PROCEDURE — 84484 ASSAY OF TROPONIN QUANT: CPT

## 2025-03-06 PROCEDURE — 81003 URINALYSIS AUTO W/O SCOPE: CPT

## 2025-03-06 PROCEDURE — G0378 HOSPITAL OBSERVATION PER HR: HCPCS

## 2025-03-06 PROCEDURE — 80048 BASIC METABOLIC PNL TOTAL CA: CPT

## 2025-03-06 PROCEDURE — 85025 COMPLETE CBC W/AUTO DIFF WBC: CPT

## 2025-03-06 PROCEDURE — 96361 HYDRATE IV INFUSION ADD-ON: CPT

## 2025-03-06 RX ORDER — 0.9 % SODIUM CHLORIDE 0.9 %
1000 INTRAVENOUS SOLUTION INTRAVENOUS ONCE
Status: COMPLETED | OUTPATIENT
Start: 2025-03-06 | End: 2025-03-06

## 2025-03-06 RX ORDER — PANTOPRAZOLE SODIUM 20 MG/1
20 TABLET, DELAYED RELEASE ORAL DAILY
Status: DISCONTINUED | OUTPATIENT
Start: 2025-03-07 | End: 2025-03-07 | Stop reason: HOSPADM

## 2025-03-06 RX ORDER — MORPHINE SULFATE 4 MG/ML
2 INJECTION INTRAVENOUS ONCE
Status: COMPLETED | OUTPATIENT
Start: 2025-03-06 | End: 2025-03-06

## 2025-03-06 RX ORDER — ATORVASTATIN CALCIUM 40 MG/1
40 TABLET, FILM COATED ORAL DAILY
Status: DISCONTINUED | OUTPATIENT
Start: 2025-03-07 | End: 2025-03-07 | Stop reason: HOSPADM

## 2025-03-06 RX ORDER — CLOPIDOGREL BISULFATE 75 MG/1
75 TABLET ORAL DAILY
Status: DISCONTINUED | OUTPATIENT
Start: 2025-03-07 | End: 2025-03-07 | Stop reason: HOSPADM

## 2025-03-06 RX ORDER — ACETAMINOPHEN 325 MG/1
650 TABLET ORAL EVERY 6 HOURS PRN
Status: DISCONTINUED | OUTPATIENT
Start: 2025-03-06 | End: 2025-03-07 | Stop reason: HOSPADM

## 2025-03-06 RX ORDER — INSULIN LISPRO 100 [IU]/ML
7 INJECTION, SOLUTION INTRAVENOUS; SUBCUTANEOUS ONCE
Status: COMPLETED | OUTPATIENT
Start: 2025-03-06 | End: 2025-03-06

## 2025-03-06 RX ORDER — ACETAMINOPHEN 650 MG/1
650 SUPPOSITORY RECTAL EVERY 6 HOURS PRN
Status: DISCONTINUED | OUTPATIENT
Start: 2025-03-06 | End: 2025-03-07 | Stop reason: HOSPADM

## 2025-03-06 RX ORDER — LANOLIN ALCOHOL/MO/W.PET/CERES
400 CREAM (GRAM) TOPICAL DAILY
Status: DISCONTINUED | OUTPATIENT
Start: 2025-03-07 | End: 2025-03-07 | Stop reason: HOSPADM

## 2025-03-06 RX ORDER — CARBIDOPA, LEVODOPA AND ENTACAPONE 50; 200; 200 MG/1; MG/1; MG/1
1 TABLET, FILM COATED ORAL 3 TIMES DAILY
Status: DISCONTINUED | OUTPATIENT
Start: 2025-03-06 | End: 2025-03-06 | Stop reason: CLARIF

## 2025-03-06 RX ORDER — AMITRIPTYLINE HYDROCHLORIDE 10 MG/1
10 TABLET ORAL NIGHTLY
Status: DISCONTINUED | OUTPATIENT
Start: 2025-03-06 | End: 2025-03-07 | Stop reason: HOSPADM

## 2025-03-06 RX ORDER — FUROSEMIDE 20 MG/1
40 TABLET ORAL DAILY
Status: DISCONTINUED | OUTPATIENT
Start: 2025-03-07 | End: 2025-03-07 | Stop reason: HOSPADM

## 2025-03-06 RX ORDER — SODIUM CHLORIDE 0.9 % (FLUSH) 0.9 %
5-40 SYRINGE (ML) INJECTION EVERY 12 HOURS SCHEDULED
Status: DISCONTINUED | OUTPATIENT
Start: 2025-03-06 | End: 2025-03-07 | Stop reason: HOSPADM

## 2025-03-06 RX ORDER — 0.9 % SODIUM CHLORIDE 0.9 %
500 INTRAVENOUS SOLUTION INTRAVENOUS ONCE
Status: DISCONTINUED | OUTPATIENT
Start: 2025-03-06 | End: 2025-03-06

## 2025-03-06 RX ORDER — SODIUM CHLORIDE 0.9 % (FLUSH) 0.9 %
5-40 SYRINGE (ML) INJECTION PRN
Status: DISCONTINUED | OUTPATIENT
Start: 2025-03-06 | End: 2025-03-07 | Stop reason: HOSPADM

## 2025-03-06 RX ORDER — ISOSORBIDE MONONITRATE 30 MG/1
30 TABLET, EXTENDED RELEASE ORAL DAILY
Status: DISCONTINUED | OUTPATIENT
Start: 2025-03-07 | End: 2025-03-07 | Stop reason: HOSPADM

## 2025-03-06 RX ORDER — ALBUTEROL SULFATE 90 UG/1
2 INHALANT RESPIRATORY (INHALATION) EVERY 6 HOURS PRN
Status: DISCONTINUED | OUTPATIENT
Start: 2025-03-06 | End: 2025-03-07 | Stop reason: HOSPADM

## 2025-03-06 RX ORDER — ROPINIROLE 0.25 MG/1
1 TABLET, FILM COATED ORAL 3 TIMES DAILY
Status: DISCONTINUED | OUTPATIENT
Start: 2025-03-06 | End: 2025-03-07 | Stop reason: HOSPADM

## 2025-03-06 RX ORDER — CEPHALEXIN 500 MG/1
500 CAPSULE ORAL EVERY 6 HOURS SCHEDULED
Status: DISCONTINUED | OUTPATIENT
Start: 2025-03-06 | End: 2025-03-07 | Stop reason: HOSPADM

## 2025-03-06 RX ORDER — SODIUM CHLORIDE 9 MG/ML
INJECTION, SOLUTION INTRAVENOUS PRN
Status: DISCONTINUED | OUTPATIENT
Start: 2025-03-06 | End: 2025-03-07 | Stop reason: HOSPADM

## 2025-03-06 RX ORDER — CARBIDOPA AND LEVODOPA 25; 100 MG/1; MG/1
2 TABLET ORAL 3 TIMES DAILY
Status: DISCONTINUED | OUTPATIENT
Start: 2025-03-06 | End: 2025-03-07 | Stop reason: HOSPADM

## 2025-03-06 RX ORDER — LATANOPROST 50 UG/ML
1 SOLUTION/ DROPS OPHTHALMIC NIGHTLY
Status: DISCONTINUED | OUTPATIENT
Start: 2025-03-06 | End: 2025-03-07 | Stop reason: HOSPADM

## 2025-03-06 RX ORDER — ENTACAPONE 200 MG/1
200 TABLET ORAL 3 TIMES DAILY
Status: DISCONTINUED | OUTPATIENT
Start: 2025-03-06 | End: 2025-03-07 | Stop reason: HOSPADM

## 2025-03-06 RX ADMIN — LATANOPROST 1 DROP: 50 SOLUTION OPHTHALMIC at 19:40

## 2025-03-06 RX ADMIN — CEPHALEXIN 500 MG: 500 CAPSULE ORAL at 22:44

## 2025-03-06 RX ADMIN — ENTACAPONE 200 MG: 200 TABLET, FILM COATED ORAL at 19:45

## 2025-03-06 RX ADMIN — MORPHINE SULFATE 2 MG: 4 INJECTION INTRAVENOUS at 16:43

## 2025-03-06 RX ADMIN — CEPHALEXIN 500 MG: 500 CAPSULE ORAL at 16:42

## 2025-03-06 RX ADMIN — ROPINIROLE HYDROCHLORIDE 1 MG: 1 TABLET, FILM COATED ORAL at 19:39

## 2025-03-06 RX ADMIN — CARBIDOPA AND LEVODOPA 2 TABLET: 25; 100 TABLET ORAL at 19:39

## 2025-03-06 RX ADMIN — SODIUM CHLORIDE, PRESERVATIVE FREE 10 ML: 5 INJECTION INTRAVENOUS at 19:40

## 2025-03-06 RX ADMIN — ROPINIROLE HYDROCHLORIDE 1 MG: 1 TABLET, FILM COATED ORAL at 16:42

## 2025-03-06 RX ADMIN — SODIUM CHLORIDE 1000 ML: 0.9 INJECTION, SOLUTION INTRAVENOUS at 14:32

## 2025-03-06 RX ADMIN — CARBIDOPA AND LEVODOPA 2 TABLET: 25; 100 TABLET ORAL at 16:42

## 2025-03-06 RX ADMIN — AMITRIPTYLINE HYDROCHLORIDE 10 MG: 10 TABLET, FILM COATED ORAL at 19:39

## 2025-03-06 RX ADMIN — INSULIN LISPRO 7 UNITS: 100 INJECTION, SOLUTION INTRAVENOUS; SUBCUTANEOUS at 14:34

## 2025-03-06 ASSESSMENT — PAIN DESCRIPTION - LOCATION: LOCATION: FOOT

## 2025-03-06 ASSESSMENT — ENCOUNTER SYMPTOMS
COLOR CHANGE: 1
EYES NEGATIVE: 1
GASTROINTESTINAL NEGATIVE: 1
SHORTNESS OF BREATH: 1

## 2025-03-06 ASSESSMENT — PAIN DESCRIPTION - ONSET: ONSET: ON-GOING

## 2025-03-06 ASSESSMENT — PAIN DESCRIPTION - FREQUENCY: FREQUENCY: CONTINUOUS

## 2025-03-06 ASSESSMENT — PAIN DESCRIPTION - PAIN TYPE: TYPE: ACUTE PAIN

## 2025-03-06 ASSESSMENT — PAIN SCALES - GENERAL
PAINLEVEL_OUTOF10: 5
PAINLEVEL_OUTOF10: 2

## 2025-03-06 ASSESSMENT — PAIN DESCRIPTION - DESCRIPTORS: DESCRIPTORS: BURNING

## 2025-03-06 ASSESSMENT — PAIN - FUNCTIONAL ASSESSMENT: PAIN_FUNCTIONAL_ASSESSMENT: PREVENTS OR INTERFERES SOME ACTIVE ACTIVITIES AND ADLS

## 2025-03-06 ASSESSMENT — PAIN DESCRIPTION - ORIENTATION: ORIENTATION: RIGHT;LEFT

## 2025-03-06 NOTE — PROGRESS NOTES
Spiritual Health History and Assessment/Progress Note  [facility]    Initial Encounter,  ,  ,      Name: Mina Gill MRN: [id number]    Age: 84 y.o.     Sex: male   Language: English   Buddhism: Yazidi   Weakness     Date: 3/6/2025            Total Time Calculated: 21 min              Spiritual Assessment began in [facility] EMERGENCY DEPARTMENT        Referral/Consult From: Rounding   Encounter Overview/Reason: Initial Encounter  Service Provided For: Patient    Melissa, Belief, Meaning:   Patient identifies as spiritual, is connected with a melissa tradition or spiritual practice, and has beliefs or practices that help with coping during difficult times  Family/Friends No family/friends present      Importance and Influence:  Patient has spiritual/personal beliefs that influence decisions regarding their health  Family/Friends No family/friends present    Community:  Patient is connected with a spiritual community and feels well-supported. Support system includes: Children  Family/Friends No family/friends present    Assessment and Plan of Care:   Patient was awake and alert when  entered his room. Patient was in good spirit and seemed to have strong melissa in God and in the power of prayer. Patient said he was raised Yazidi and a member of [employer] [facility]. Patient received sacrament of anointing of the sick. Family was not present at the time.  provided ministry of presence, offered support and prayed with patient. Patient expressed gratitude for the anointing and blessing he received    Patient Interventions include: Facilitated expression of thoughts and feelings, Explored spiritual coping/struggle/distress, Affirmed coping skills/support systems, and Provided sacramental/Rastafari ritual  Family/Friends Interventions include: No family/friends present    Patient Plan of Care: Spiritual Care available upon further referral  Family/Friends Plan of Care:  Spiritual Care available upon further referral    Electronically signed by Chaplain Erin on 3/6/2025 at 3:32 PM

## 2025-03-06 NOTE — CARE COORDINATION
Case Management Assessment  Initial Evaluation    Date/Time of Evaluation: 3/6/2025 4:43 PM  Assessment Completed by: GEN KANG    If patient is discharged prior to next notation, then this note serves as note for discharge by case management.    Patient Name: Mina Gill                   YOB: 1940  Diagnosis: Weakness [R53.1]                   Date / Time: 3/6/2025 12:51 PM    Patient Admission Status: Observation   Readmission Risk (Low < 19, Mod (19-27), High > 27): Readmission Risk Score: 18.8    Current PCP: Tisha Glasgow MD  PCP verified by CM? (P) Yes    Chart Reviewed: Yes      History Provided by: (P) Patient, Child/Family  Patient Orientation: (P) Alert and Oriented, Person, Place, Situation, Self    Patient Cognition: (P) Alert    Hospitalization in the last 30 days (Readmission):  No    If yes, Readmission Assessment in  Navigator will be completed.    Advance Directives:      Code Status: Full Code   Patient's Primary Decision Maker is: (P) Legal Next of Kin    Primary Decision Maker: GillDelvin rose - Child - [phone]    Secondary Decision Maker: Gill,Ivone - Child - [phone]    Discharge Planning:    Patient lives with: (P) Family Members, Friends (Family friend Dana Elam) Type of Home: (P) House  Primary Care Giver: (P) Self  Patient Support Systems include: (P) Children, Family Members, Friends/Neighbors, Home Care Staff   Current Financial resources: (P) Medicaid, Medicare  Current community resources: (P) ECF/Home Care  Current services prior to admission: (P) Durable Medical Equipment, Home Care, Private Duty Homecare, Transportation            Current DME: (P) Walker, Wheelchair, Cane, Shower Chair, Hospital Bed (Pt has PP caregivers Dana and Lora as well as C but can not recall name of company)            Type of Home Care services:  (P) Aide Services, PT, Nursing Services    ADLS  Prior functional level: (P) Assistance with the following:, Bathing,

## 2025-03-06 NOTE — ED PROVIDER NOTES
Temp 97.7 °F (36.5 °C) (Oral)   Resp 14   SpO2 100%     GENERAL APPEARANCE: Appears chronically ill, no acute distress alert and oriented  HEENT: Normocephalic and atraumatic. Mucus membranes moist. EOMI, clear conjunctiva, oropharynx clear.   NECK: Supple without lymphadenopathy. No stiffness or restricted ROM.   HEART: Normal rate and regular rhythm  LUNGS: clear to auscultation without wheezes or rales, decreased air movement  ABDOMEN: Soft, nontender, nondistended with good bowel sounds heard.   BACK: No CVAT, no obvious deformity.   EXTREMITIES: Foot of right dorsum with erythema and slight swelling.  Neurovascular intact.  Picture in media tab.  NEUROLOGICAL: Grossly nonfocal. Alert and oriented, moving all 4 extremities. CN not formally tested but appear grossly intact.   SKIN: Warm and dry without any rash.    Plan     DIAGNOSTIC ORDERS:  Orders Placed This Encounter   Procedures    XR CHEST PORTABLE    CBC with Auto Differential    BMP    Troponin    Urinalysis with Reflex to Culture    Hemoglobin A1C    EKG 12 Lead       MEDICATION ORDERS:  Orders Placed This Encounter   Medications    DISCONTD: sodium chloride 0.9 % bolus 500 mL    sodium chloride 0.9 % bolus 1,000 mL    insulin lispro (HUMALOG,ADMELOG) injection vial 7 Units    cephALEXin (KEFLEX) capsule 500 mg     Order Specific Question:   Antimicrobial Indications     Answer:   Skin and Soft Tissue Infection       Diagnostic Results     LABS:  Results for orders placed or performed during the hospital encounter of 03/06/25   CBC with Auto Differential   Result Value Ref Range    WBC 6.8 3.5 - 11.3 k/uL    RBC 4.91 4.21 - 5.77 m/uL    Hemoglobin 16.0 13.0 - 17.0 g/dL    Hematocrit 45.6 40.7 - 50.3 %    MCV 92.9 82.6 - 102.9 fL    MCH 32.6 25.2 - 33.5 pg    MCHC 35.1 (H) 28.4 - 34.8 g/dL    RDW 13.1 11.8 - 14.4 %    Platelets 201 138 - 453 k/uL    MPV 8.9 8.1 - 13.5 fL    NRBC Automated 0.0 0.0 per 100 WBC    Neutrophils % 64 36 - 65 %    Lymphocytes    1440 Will start on oral Keflex for cellulitis over the dorsum right foot. [GP]      ED Course User Index  [GP] Sukhwinder Wright DO         Procedures     None    Final impression      1. Generalized weakness    2. Cellulitis of right lower extremity           Disposition with plan     Disposition: Decision To Admit    PATIENT REFERRED TO:  No follow-up provider specified.    DISCHARGE MEDICATIONS:  New Prescriptions    No medications on file         Sukhwinder Wright DO  Emergency Medicine Resident    (Please note that portions of this note were completed with a voice recognition program.  Efforts were made to edit the dictations but occasionally words are mis-transcribed.)

## 2025-03-06 NOTE — H&P
[facility]  CDU / OBSERVATION ENCOUNTER  RESIDENT NOTE     Pt Name: Mina Gill  MRN: [id number]  Birthdate 1940  Date of evaluation: 3/6/25  Patient's PCP is :  Tisha Glasgow MD    CHIEF COMPLAINT       Chief Complaint   Patient presents with    Fatigue         HISTORY OF PRESENT ILLNESS    Mina Gill is a 84 y.o. male who presents with generalized fatigue, weakness, and right foot pain over the last 3 to 4 days.  Patient denies any injury to his right foot, believes he may be suffering from athlete's foot.  Patient states he soaked his right foot in bleach at home trying to alleviate his symptoms.  States his foot became worse after the bleach bath and now believes it may be infected.  On exam patient is alert and oriented x 3.  Patient has a history of Parkinson's disease, severe tremors noted on bilateral upper extremities. Patient denies chest pain, shortness of breath, nausea, vomiting, abdominal pain, constipation, and diarrhea.Blanchable erythema, swelling, and tenderness noted on right foot.    Location/Symptom: right foot  Timing/Onset: 3-4 days  Provocation: unknown  Quality: N/A  Radiation: N/A  Severity: 5/10  Timing/Duration: constant  Modifying Factors: rest and analgesics    REVIEW OF SYSTEMS       Review of Systems   Constitutional: Negative.    HENT: Negative.     Eyes: Negative.    Respiratory:  Positive for shortness of breath.         Patient endorses SOB with exertion   Gastrointestinal: Negative.    Genitourinary: Negative.    Musculoskeletal:  Positive for gait problem.   Skin:  Positive for color change.        Erythema noted on right foot   Neurological:  Positive for tremors and weakness.        Hx parkinsons         (PQRS) Advance directives on face sheet per hospital policy. 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 (HCC), CKD (chronic kidney

## 2025-03-06 NOTE — ED NOTES
ED to inpatient nurses report      Chief Complaint:  Chief Complaint   Patient presents with    Fatigue     Present to ED from: home    MOA:     LOC: alert and orientated to name, place, date  Mobility: Requires assistance * 2  Oxygen Baseline: room air     Current needs required: n/a   Pending ED orders: n/a  Present condition: stable    Why did the patient come to the ED? Patient to ED for generalized weakness and fatigue x3 days. Denies any shortness of breath or chest pain. Patient states his right foot is hurting, noted to be red and swollen. Patient states he lives with his friend who gave his foot a bleach bath for \"athlete's foot.\" Patient alert and oriented x4, talking in complete sentences.  What is the plan? Admission for PT/OT eval due to generalized weakness  Any procedures or intervention occur? N/a  Any safety concerns?? FALL RISK    Mental Status:  Level of Consciousness: Alert (0)    Psych Assessment:   Psychosocial  Psychosocial (WDL): Within Defined Limits  Vital signs   Vitals:    03/06/25 1307 03/06/25 1345 03/06/25 1400 03/06/25 1430   BP:  110/88 127/70 (!) 156/69   Pulse: 71 70 71 70   Resp:  16 20 14   Temp:       TempSrc:       SpO2:  99% 98% 100%        Vitals:  Patient Vitals for the past 24 hrs:   BP Temp Temp src Pulse Resp SpO2   03/06/25 1430 (!) 156/69 -- -- 70 14 100 %   03/06/25 1400 127/70 -- -- 71 20 98 %   03/06/25 1345 110/88 -- -- 70 16 99 %   03/06/25 1307 -- -- -- 71 -- --   03/06/25 1302 -- -- -- 81 18 99 %   03/06/25 1301 -- -- -- 75 20 100 %   03/06/25 1300 (!) 138/90 -- -- -- -- 97 %   03/06/25 1255 -- 97.7 °F (36.5 °C) Oral -- -- 97 %   03/06/25 1254 94/66 -- -- -- -- 97 %      Visit Vitals  BP (!) 156/69   Pulse 70   Temp 97.7 °F (36.5 °C) (Oral)   Resp 14   SpO2 100%        LDAs:   Peripheral IV Left Forearm (Active)   Site Assessment Clean, dry & intact 03/06/25 1257   Line Status Blood return noted;Flushed 03/06/25 1257       Peripheral IV 03/06/25 Left Antecubital  drop on the job    Temporary loss of eyesight     periodically, happened x7    Tubular adenoma of colon 2012, 2017    [facility]       Labs:  Labs Reviewed   CBC WITH AUTO DIFFERENTIAL - Abnormal; Notable for the following components:       Result Value    MCHC 35.1 (*)     Immature Granulocytes % 1 (*)     All other components within normal limits   BASIC METABOLIC PANEL - Abnormal; Notable for the following components:    Sodium 129 (*)     Chloride 94 (*)     Glucose 338 (*)     Creatinine 1.4 (*)     Est, Glom Filt Rate 49 (*)     All other components within normal limits   TROPONIN - Abnormal; Notable for the following components:    Troponin, High Sensitivity 25 (*)     All other components within normal limits   URINALYSIS WITH REFLEX TO CULTURE - Abnormal; Notable for the following components:    Glucose, Ur 2+ (*)     All other components within normal limits   HEMOGLOBIN A1C       Electronically signed by JOSE BRIONES RN on 3/6/2025 at 2:43 PM

## 2025-03-06 NOTE — ED NOTES
Patient to ED for generalized weakness and fatigue x3 days. Denies any shortness of breath or chest pain. Patient states his right foot is hurting, noted to be red and swollen. Patient states he lives with his friend who gave his foot a bleach bath for \"athlete's foot.\" Patient alert and oriented x4, talking in complete sentences. Respirations even and unlabored. Patient placed on continuous cardiac monitoring, BP cuff, and pulse ox. EKG obtained, blood work obtained. Call light in reach, all needs met at this time

## 2025-03-06 NOTE — ED PROVIDER NOTES
[facility]     Emergency Department     Faculty Attestation    I performed a history and physical examination of the patient and discussed management with the resident. I have reviewed and agree with the resident’s findings including all diagnostic interpretations, and treatment plans as written at the time of my review. Any areas of disagreement are noted on the chart. I was personally present for the key portions of any procedures. I have documented in the chart those procedures where I was not present during the key portions. For Physician Assistant/ Nurse Practitioner cases/documentation I have personally evaluated this patient and have completed at least one if not all key elements of the E/M (history, physical exam, and MDM). Additional findings are as noted.    PtName: Mina Gill  MRN: [id number]  Birthdate 1940  Date of evaluation: 3/6/25  Note Started: 12:56 PM EST    Primary Care Physician: Tisha Glasgow MD    Brief HPI:  Patient is an 84-year-old male presents to the emergency department with generalized weakness and fatigue.  The patient reports symptoms have been present over the past 3 to 4 days and progressively worsening.  He also reports erythema and pain to the dorsum of his right foot.     Pertinent Physical Exam Findings:  Vitals:    03/06/25 1255   Temp: 97.7 °F (36.5 °C)   SpO2: 97%   Appears chronically ill, resting comfortably, no acute distress, resting tremor, regular rate and rhythm, lungs are clear to auscultation, abdomen is soft and nontender.  Blanchable erythema, swelling, and tenderness noted to the dorsum of the right foot.    Medical Decision Making: Patient is a 84 y.o. male presenting to the emergency department with generalized fatigue and weakness. The chart was reviewed for pertinent history relating to the chief complaint.  The patient appears ill, however no acute distress, vitals are stable.  Twelve-lead EKG reveals  dual paced rhythm.  Plan to obtain CBC, BMP, troponin, urinalysis, and chest x-ray.     All results, including labs (if ordered), imaging (if ordered), and EKGs (if ordered) were independently interpreted by me.  See radiologist report for additional details on imaging studies.      (Please note that portions of this note were completed with a voice recognition program.  Efforts were made to edit the dictations but occasionally words are mis-transcribed.)    Girma Sanchez DO   Attending Emergency Medicine Physician         Girma Sanchez DO  03/06/25 1752

## 2025-03-07 VITALS
TEMPERATURE: 97.7 F | HEART RATE: 72 BPM | SYSTOLIC BLOOD PRESSURE: 122 MMHG | DIASTOLIC BLOOD PRESSURE: 76 MMHG | OXYGEN SATURATION: 99 % | RESPIRATION RATE: 18 BRPM

## 2025-03-07 DIAGNOSIS — K20.90 ESOPHAGITIS: ICD-10-CM

## 2025-03-07 DIAGNOSIS — G20.A1 PARKINSON'S DISEASE WITHOUT FLUCTUATING MANIFESTATIONS, UNSPECIFIED WHETHER DYSKINESIA PRESENT (HCC): ICD-10-CM

## 2025-03-07 DIAGNOSIS — K11.7 DROOLING: ICD-10-CM

## 2025-03-07 DIAGNOSIS — G25.2 RESTING TREMOR: ICD-10-CM

## 2025-03-07 LAB
EKG ATRIAL RATE: 77 BPM
EKG P-R INTERVAL: 194 MS
EKG Q-T INTERVAL: 424 MS
EKG QRS DURATION: 128 MS
EKG QTC CALCULATION (BAZETT): 479 MS
EKG R AXIS: -78 DEGREES
EKG T AXIS: 89 DEGREES
EKG VENTRICULAR RATE: 77 BPM

## 2025-03-07 PROCEDURE — G0378 HOSPITAL OBSERVATION PER HR: HCPCS

## 2025-03-07 PROCEDURE — 6370000000 HC RX 637 (ALT 250 FOR IP): Performed by: EMERGENCY MEDICINE

## 2025-03-07 PROCEDURE — 2500000003 HC RX 250 WO HCPCS

## 2025-03-07 PROCEDURE — 97530 THERAPEUTIC ACTIVITIES: CPT

## 2025-03-07 PROCEDURE — 97162 PT EVAL MOD COMPLEX 30 MIN: CPT

## 2025-03-07 PROCEDURE — 6370000000 HC RX 637 (ALT 250 FOR IP)

## 2025-03-07 PROCEDURE — 93010 ELECTROCARDIOGRAM REPORT: CPT | Performed by: INTERNAL MEDICINE

## 2025-03-07 RX ORDER — GABAPENTIN 100 MG/1
CAPSULE ORAL
Qty: 30 CAPSULE | Refills: 3 | Status: SHIPPED | OUTPATIENT
Start: 2025-03-07 | End: 2025-05-06

## 2025-03-07 RX ORDER — ACETAMINOPHEN 500 MG
1000 TABLET ORAL ONCE
Status: COMPLETED | OUTPATIENT
Start: 2025-03-07 | End: 2025-03-07

## 2025-03-07 RX ORDER — PANTOPRAZOLE SODIUM 20 MG/1
20 TABLET, DELAYED RELEASE ORAL DAILY
Qty: 90 TABLET | Refills: 3 | OUTPATIENT
Start: 2025-03-07

## 2025-03-07 RX ORDER — CETIRIZINE HYDROCHLORIDE 10 MG/1
10 TABLET ORAL DAILY
Status: DISCONTINUED | OUTPATIENT
Start: 2025-03-07 | End: 2025-03-07 | Stop reason: HOSPADM

## 2025-03-07 RX ORDER — CARBIDOPA, LEVODOPA AND ENTACAPONE 50; 200; 200 MG/1; MG/1; MG/1
TABLET, FILM COATED ORAL
Qty: 90 TABLET | Refills: 3 | OUTPATIENT
Start: 2025-03-07

## 2025-03-07 RX ORDER — FUROSEMIDE 40 MG/1
TABLET ORAL
Qty: 30 TABLET | Refills: 3 | OUTPATIENT
Start: 2025-03-07

## 2025-03-07 RX ORDER — ISOSORBIDE MONONITRATE 30 MG/1
30 TABLET, EXTENDED RELEASE ORAL DAILY
Qty: 90 TABLET | Refills: 3 | OUTPATIENT
Start: 2025-03-07

## 2025-03-07 RX ORDER — CEPHALEXIN 500 MG/1
500 CAPSULE ORAL 4 TIMES DAILY
Qty: 20 CAPSULE | Refills: 0 | Status: SHIPPED | OUTPATIENT
Start: 2025-03-07 | End: 2025-03-12

## 2025-03-07 RX ORDER — ONDANSETRON 4 MG/1
4 TABLET, ORALLY DISINTEGRATING ORAL EVERY 8 HOURS PRN
Status: DISCONTINUED | OUTPATIENT
Start: 2025-03-07 | End: 2025-03-07 | Stop reason: HOSPADM

## 2025-03-07 RX ORDER — CETIRIZINE HYDROCHLORIDE 5 MG/1
5 TABLET ORAL DAILY
Qty: 10 TABLET | Refills: 0 | Status: SHIPPED | OUTPATIENT
Start: 2025-03-08 | End: 2025-03-18

## 2025-03-07 RX ADMIN — ENTACAPONE 200 MG: 200 TABLET, FILM COATED ORAL at 08:43

## 2025-03-07 RX ADMIN — CARBIDOPA AND LEVODOPA 2 TABLET: 25; 100 TABLET ORAL at 08:43

## 2025-03-07 RX ADMIN — FUROSEMIDE 40 MG: 20 TABLET ORAL at 08:43

## 2025-03-07 RX ADMIN — CETIRIZINE HYDROCHLORIDE 10 MG: 10 TABLET, FILM COATED ORAL at 11:25

## 2025-03-07 RX ADMIN — ISOSORBIDE MONONITRATE 30 MG: 30 TABLET, EXTENDED RELEASE ORAL at 08:43

## 2025-03-07 RX ADMIN — ENTACAPONE 200 MG: 200 TABLET, FILM COATED ORAL at 14:58

## 2025-03-07 RX ADMIN — SODIUM CHLORIDE, PRESERVATIVE FREE 10 ML: 5 INJECTION INTRAVENOUS at 08:46

## 2025-03-07 RX ADMIN — ONDANSETRON 4 MG: 4 TABLET, ORALLY DISINTEGRATING ORAL at 08:43

## 2025-03-07 RX ADMIN — CARBIDOPA AND LEVODOPA 2 TABLET: 25; 100 TABLET ORAL at 14:58

## 2025-03-07 RX ADMIN — ACETAMINOPHEN 1000 MG: 500 TABLET ORAL at 11:24

## 2025-03-07 RX ADMIN — CEPHALEXIN 500 MG: 500 CAPSULE ORAL at 11:25

## 2025-03-07 RX ADMIN — CEPHALEXIN 500 MG: 500 CAPSULE ORAL at 07:12

## 2025-03-07 RX ADMIN — ATORVASTATIN CALCIUM 40 MG: 40 TABLET, FILM COATED ORAL at 08:43

## 2025-03-07 RX ADMIN — Medication 400 MG: at 08:43

## 2025-03-07 RX ADMIN — ROPINIROLE HYDROCHLORIDE 1 MG: 1 TABLET, FILM COATED ORAL at 14:58

## 2025-03-07 RX ADMIN — CLOPIDOGREL BISULFATE 75 MG: 75 TABLET ORAL at 08:43

## 2025-03-07 RX ADMIN — ACETAMINOPHEN 650 MG: 325 TABLET ORAL at 07:15

## 2025-03-07 RX ADMIN — PANTOPRAZOLE SODIUM 20 MG: 20 TABLET, DELAYED RELEASE ORAL at 08:43

## 2025-03-07 RX ADMIN — ROPINIROLE HYDROCHLORIDE 1 MG: 1 TABLET, FILM COATED ORAL at 08:43

## 2025-03-07 ASSESSMENT — PAIN DESCRIPTION - LOCATION
LOCATION: HEAD
LOCATION: HEAD

## 2025-03-07 ASSESSMENT — PAIN SCALES - GENERAL
PAINLEVEL_OUTOF10: 2
PAINLEVEL_OUTOF10: 2
PAINLEVEL_OUTOF10: 6
PAINLEVEL_OUTOF10: 3

## 2025-03-07 NOTE — CARE COORDINATION
Discharge Report    [facility]  Clinical Case Management Department  Written by: LASHONDA CHRISTIANSEN RN    Patient Name: Mina Gill  Attending Provider: Frank Kirby MD  Admit Date: 3/6/2025 12:51 PM  MRN: [id number]  Account: [id number]                     : 1940  Discharge Date: 3/7/25  Call from [facility] requesting discharge orders to be faxed     Disposition: home with [facility]    LASHONDA CHRISTIANSEN RN

## 2025-03-07 NOTE — DISCHARGE INSTRUCTIONS
Your workup from the emergency department did not show any significant pathology but you were admitted to the observation unit for ongoing evaluation.    Your testing included point-of-care glucose, hemoglobin A1c, urine culture, troponin, BMP, CBC, CXR, EKG 12 leads    You saw the following specialists emergency medicine, internal medicine    We no longer need to keep you in the hospital but that does not mean you are done being treated  -Take your prescribed medications as directed  -Follow-up with your primary care physician or the specialist designated or both as scheduled    Please return if your condition worsens or there are new symptoms    If there are concerns that have not been addressed while you have been in the hospital please let the discharge nurse know so the physician can be informed -it is easier to fix problems while you are still here    If you have questions after you have left the hospital please call the unit at  and ask for the observation resident on-call

## 2025-03-07 NOTE — TELEPHONE ENCOUNTER
VIDEO Once 12/16/2024   Initiate Oxygen Therapy Protocol PRN                Patient Active Problem List:     Temporary loss of eyesight     Syncope : posterior circulation stroke vs Neurocardiogenic syncope      Intracranial bleed : traumatic left sub-dural hematoma ,managed conservatively in 2011     Headache     CKD (chronic kidney disease) stage 3, GFR 30-59 ml/min ([facility])     Depression     Hyperlipidemia     Microhematuria     Microalbuminuria     Essential hypertension     Generalized abdominal pain     Tubular adenoma of colon     Diverticulosis of colon     History of skull fracture     Nausea     Pure hypercholesterolemia     Prediabetes     Parkinson disease ([facility])     Chronic post-traumatic headache, not intractable     Fall (on) (from) other stairs and steps, initial encounter     Strain of iliopsoas muscle, initial encounter     Chronic pain of left knee     Closed fracture of second toe of right foot     Closed fracture of second toe of left foot     Abnormal ECG     Cerebrovascular accident ([facility])     Chest pain, unspecified     Hematoma of scalp     Left ventricular hypertrophy     Mild aortic valve regurgitation     Pulmonary hypertension, mild ([facility])     Lumbar radiculopathy     Dizziness     Hyponatremia     Vomiting     General weakness     Overweight (BMI 25.0-29.9)     Frequent falls     Unspecified inflammatory spondylopathy, lumbar region     Stage 3b chronic kidney disease (CKD) ([facility])     Chronic pain of multiple joints     Multiple comorbid conditions     Gout     Nerve pain     NSTEMI (non-ST elevated myocardial infarction) ([facility])     History of subdural hematoma     Coronary artery disease involving native heart with angina pectoris     Lumbar facet arthropathy     Spinal stenosis of lumbar region without neurogenic claudication     Generalized weakness     Lumbosacral spondylosis without myelopathy     Gallstone pancreatitis     Calculus of gallbladder with acute cholecystitis without

## 2025-03-07 NOTE — PROGRESS NOTES
[facility]  CDU / OBSERVATION ENCOUNTER  ATTENDING NOTE       I performed a history and physical examination of the patient and discussed management with the resident or midlevel provider. I reviewed the resident or midlevel provider's note and agree with the documented findings and plan of care. Any areas of disagreement are noted on the chart. I was personally present for the key portions of any procedures. I have documented in the chart those procedures where I was not present during the key portions. I have reviewed the nurses notes. I agree with the chief complaint, past medical history, past surgical history, allergies, medications, social and family history as documented unless otherwise noted below.    The Family history, social history, and ROS are effectively unchanged since admission unless noted elsewhere in the chart.    This patient was placed in the observation unit for reevaluation for possible admission to the hospital    Patient admitted to the ETU for generalized weakness and difficulty ambulating.  Patient does have a history of Parkinson's.  Patient denies any recent falls.  He denies any recent fever, cold or flulike symptoms.  He denies any chest pain or shortness of breath or dizziness.  Patient does complain of some right foot pain as well.  Patient did soak the foot in bleach at home because he was worried about possible athlete's foot.  Patient was started on Keflex by the emergency department with concern for cellulitis.  Patient was also found to be hyperglycemic in the emergency department with an elevated hemoglobin A1c.  Diabetes type 2 is listed in his chart but it does not appear that patient is on any medication for this.    On my exam today, patient has no new complaints.  Will have PT/OT evaluate patient.  Will continue the Keflex for the mild cellulitis to the right lower extremity.  Will reach out to family medicine as patient is seen in their clinic to  ----- Message from Richelle Collado sent at 8/29/2024  9:26 AM EDT -----  Please let patient know that the arterial ultrasound of her legs was normal, as previously resulted.  But, the ultrasound of her legs that checks the veins does show what we call venous insufficiency.  I can refer her onto a vascular specialist for further evaluation if she would like to proceed.

## 2025-03-07 NOTE — PROGRESS NOTES
Physical Therapy  Facility/Department: [facility] OBSERVATION UNIT   Physical Therapy Initial Evaluation    Patient Name: Mina Gill        MRN: [id number]    : 1940    Date of Service: 3/7/2025    Chief Complaint   Patient presents with    Fatigue     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 (HCC), CKD (chronic kidney disease) stage 2, GFR 60-89 ml/min, CKD (chronic kidney disease) stage 3, GFR 30-59 ml/min (HCC), Diverticulosis of colon, GERD (gastroesophageal reflux disease), History of non-ST elevation myocardial infarction (NSTEMI) 2022, Impaired renal function, MRSA (methicillin resistant staph aureus) culture positive, Osteoarthritis of knee, Parkinson disease (HCC), Proteinuria, Skull fracture (HCC), Temporary loss of eyesight, and Tubular adenoma of colon.  Past Surgical History:  has a past surgical history that includes Colonoscopy (2012); shoulder surgery (Right); pr colsc flx w/rmvl of tumor polyp lesion snare tq (N/A, 2017); Colonoscopy (2017); Cholecystectomy, laparoscopic (N/A, 10/29/2022); Esophagogastroduodenoscopy (2023); Upper gastrointestinal endoscopy (N/A, 2023); Upper gastrointestinal endoscopy (N/A, 2023); Cardiac procedure (N/A, 2024); Cardiac procedure (N/A, 8/3/2024); and ep device procedure (N/A, 2024).    Discharge Recommendations   Further therapy recommended at discharge.    PT Equipment Recommendations  Equipment Needed: No    Assessment  Body Structures, Functions, Activity Limitations Requiring Skilled Therapeutic Intervention: Decreased functional mobility , Decreased strength, Decreased balance, Increased pain  Assessment: Pt CGA to Gustavo bed mobility, CGA transfers and CGA amb 11' RW. Pt would benefit from continued acute PT to address deficits.  Therapy Prognosis: Good  Decision Making: Medium Complexity  Requires PT Follow-Up: Yes  Activity Tolerance  Activity Tolerance:  History  Lives With: Friend(s)  Type of Home: House  Home Layout: Two level, Able to Live on Main level with bedroom/bathroom, Laundry in basement  Home Access: Stairs to enter with rails  Entrance Stairs - Number of Steps: 5  Entrance Stairs - Rails: Both  Bathroom Shower/Tub: Walk-in shower  Bathroom Toilet: Standard  Bathroom Equipment: Shower chair, Grab bars around toilet  Bathroom Accessibility: Accessible  Home Equipment: Cane, Walker - Rolling, Wheelchair - Manual  Prior Level of Assist for ADLs: Needs assistance (help dressing and bathing)  Toileting: Needs assistance  Prior Level of Assist for Homemaking: Needs assistance  Homemaking Responsibilities:  (she does)  Prior Level of Assist for Ambulation:  (I mainly ane us)  Prior Level of Assist for Transfers: Independent  Active : No  Patient's  Info: friend  Mode of Transportation: Truck  Occupation: Retired  Type of Occupation: boilers  IADL Comments: R handed    Vision/Hearing  Vision  Vision: Impaired  Vision Exceptions: Wears glasses at all times  Hearing  Hearing: Exceptions to WFL  Hearing Exceptions: Hard of hearing/hearing concerns    Objective  Cognition  Overall Cognitive Status: Exceptions  Arousal/Alertness: Appropriate responses to stimuli  Following Commands: Follows all commands without difficulty  Attention Span: Attends with cues to redirect  Safety Judgement: Decreased awareness of need for assistance  Problem Solving: Assistance required to identify errors made  Insights: Decreased awareness of deficits  Initiation: Does not require cues  Sequencing: Does not require cues         AROM RLE (degrees)  RLE AROM: WFL  AROM LLE (degrees)  LLE AROM : WFL  AROM RUE (degrees)  RUE General AROM: see OT  AROM LUE (degrees)  LUE General AROM: see OT          Strength RLE  Strength RLE: WFL  Comment: grossly 4/5  Strength LLE  Strength LLE: WFL  Comment: grossly 4/5  Strength RUE  Comment: grossly 4/5  Strength LUE  Comment: grossly

## 2025-03-17 ENCOUNTER — OFFICE VISIT (OUTPATIENT)
Dept: FAMILY MEDICINE CLINIC | Age: 85
End: 2025-03-17

## 2025-03-17 ENCOUNTER — HOSPITAL ENCOUNTER (OUTPATIENT)
Dept: SPEECH THERAPY | Age: 85
Setting detail: THERAPIES SERIES
Discharge: HOME | End: 2025-03-17
Payer: COMMERCIAL

## 2025-03-17 VITALS
TEMPERATURE: 97.1 F | HEART RATE: 70 BPM | OXYGEN SATURATION: 97 % | SYSTOLIC BLOOD PRESSURE: 109 MMHG | BODY MASS INDEX: 28.63 KG/M2 | HEIGHT: 67 IN | WEIGHT: 182.4 LBS | DIASTOLIC BLOOD PRESSURE: 67 MMHG

## 2025-03-17 DIAGNOSIS — L03.115 CELLULITIS OF RIGHT FOOT: Primary | ICD-10-CM

## 2025-03-17 DIAGNOSIS — Z09 HOSPITAL DISCHARGE FOLLOW-UP: ICD-10-CM

## 2025-03-17 PROCEDURE — 92610 EVALUATE SWALLOWING FUNCTION: CPT

## 2025-03-17 ASSESSMENT — PATIENT HEALTH QUESTIONNAIRE - PHQ9
SUM OF ALL RESPONSES TO PHQ QUESTIONS 1-9: 11
10. IF YOU CHECKED OFF ANY PROBLEMS, HOW DIFFICULT HAVE THESE PROBLEMS MADE IT FOR YOU TO DO YOUR WORK, TAKE CARE OF THINGS AT HOME, OR GET ALONG WITH OTHER PEOPLE: NOT DIFFICULT AT ALL
SUM OF ALL RESPONSES TO PHQ QUESTIONS 1-9: 11
9. THOUGHTS THAT YOU WOULD BE BETTER OFF DEAD, OR OF HURTING YOURSELF: NOT AT ALL
SUM OF ALL RESPONSES TO PHQ QUESTIONS 1-9: 11
2. FEELING DOWN, DEPRESSED OR HOPELESS: MORE THAN HALF THE DAYS
3. TROUBLE FALLING OR STAYING ASLEEP: NEARLY EVERY DAY
SUM OF ALL RESPONSES TO PHQ QUESTIONS 1-9: 11
1. LITTLE INTEREST OR PLEASURE IN DOING THINGS: NOT AT ALL
7. TROUBLE CONCENTRATING ON THINGS, SUCH AS READING THE NEWSPAPER OR WATCHING TELEVISION: SEVERAL DAYS
4. FEELING TIRED OR HAVING LITTLE ENERGY: SEVERAL DAYS
8. MOVING OR SPEAKING SO SLOWLY THAT OTHER PEOPLE COULD HAVE NOTICED. OR THE OPPOSITE, BEING SO FIGETY OR RESTLESS THAT YOU HAVE BEEN MOVING AROUND A LOT MORE THAN USUAL: NEARLY EVERY DAY
5. POOR APPETITE OR OVEREATING: NOT AT ALL
6. FEELING BAD ABOUT YOURSELF - OR THAT YOU ARE A FAILURE OR HAVE LET YOURSELF OR YOUR FAMILY DOWN: SEVERAL DAYS

## 2025-03-17 ASSESSMENT — ENCOUNTER SYMPTOMS
STRIDOR: 0
BACK PAIN: 0
COUGH: 0
SHORTNESS OF BREATH: 0
ABDOMINAL PAIN: 0

## 2025-03-17 NOTE — PROGRESS NOTES
Attending Physician Statement  I  have discussed the care of Mina Gill including pertinent history and exam findings with the resident. I agree with the assessment, plan and orders as documented by the resident.      /67 (BP Site: Left Upper Arm, Patient Position: Sitting, BP Cuff Size: Medium Adult)   Pulse 70   Temp 97.1 °F (36.2 °C) (Oral)   Ht 1.702 m (5' 7.01\")   Wt 82.7 kg (182 lb 6.4 oz)   SpO2 97%   BMI 28.56 kg/m²    BP Readings from Last 3 Encounters:   03/17/25 109/67   03/07/25 122/76   02/27/25 98/62     Wt Readings from Last 3 Encounters:   03/17/25 82.7 kg (182 lb 6.4 oz)   02/27/25 82.6 kg (182 lb)   02/11/25 83.5 kg (184 lb)          Diagnosis Orders   1. Cellulitis of right foot  [facility] [place]      2. Hospital discharge follow-up  DE DISCHARGE MEDS RECONCILED W/ CURRENT OUTPATIENT MED LIST              Scott Bell DO 3/17/2025 10:57 AM      
Visit Information    Have you changed or started any medications since your last visit including any over-the-counter medicines, vitamins, or herbal medicines? no   Have you stopped taking any of your medications? Is so, why? -  no  Are you having any side effects from any of your medications? - no    Have you seen any other physician or provider since your last visit?  no   Have you had any other diagnostic tests since your last visit?  no   Have you been seen in the emergency room and/or had an admission in a hospital since we last saw you?  yes - [facility]   Have you had your routine dental cleaning in the past 6 months?  no     Do you have an active MyChart account? If no, what is the barrier?  Yes    Patient Care Team:  Tisha Glasgow MD as PCP - General (Family Medicine)  Maria Elena Farooq MD (Neurology)  Felisa John MD (Nephrology)  Bo Lindsey MD as Consulting Physician (Cardiovascular Disease)  Ross Garcia MD as Consulting Physician (Gastroenterology)  Slime Baker APRN - CNP as Nurse Practitioner (Nurse Practitioner)  Chuy Bailey MD as Consulting Physician (Pain Management)  Ramon Milan APRN - CNP as Nurse Practitioner (Pulmonology)    Medical History Review  Past Medical, Family, and Social History reviewed and does not contribute to the patient presenting condition    Health Maintenance   Topic Date Due    Shingles vaccine (1 of 2) Never done    Respiratory Syncytial Virus (RSV) Pregnant or age 60 yrs+ (1 - 1-dose 75+ series) Never done    Hepatitis B vaccine (2 of 3 - 19+ 3-dose series) 12/14/2023    Flu vaccine (1) 08/01/2024    COVID-19 Vaccine (6 - 2024-25 season) 09/01/2024    Lipids  12/15/2024    Annual Wellness Visit (Medicare Advantage)  01/01/2025    Depression Monitoring  02/27/2026    Colorectal Cancer Screen  09/19/2027    DTaP/Tdap/Td vaccine (4 - Td or Tdap) 11/16/2033    Pneumococcal 50+ years Vaccine  Completed    Hepatitis A vaccine  Aged Out 
erythema  Head: normocephalic and atraumatic  Eyes: pupils equal, round, and reactive to light, extraocular eye movements intact, conjunctivae normal  ENT: tympanic membrane, external ear and ear canal normal bilaterally, nose without deformity, nasal mucosa and turbinates normal without polyps  Neck: supple and non-tender without mass, no thyromegaly or thyroid nodules, no cervical lymphadenopathy  Pulmonary/Chest: clear to auscultation bilaterally- no wheezes, rales or rhonchi, normal air movement, no respiratory distress  Cardiovascular: normal rate, regular rhythm, normal S1 and S2, no murmurs, rubs, clicks, or gallops, distal pulses intact, no carotid bruits  Abdomen: soft, non-tender, non-distended, normal bowel sounds, no masses or organomegaly  Extremities: no cyanosis, clubbing or edema  Musculoskeletal: normal range of motion, no joint swelling, deformity or tenderness  Neurologic: reflexes normal and symmetric, no cranial nerve deficit, gait, coordination and speech normal      An electronic signature was used to authenticate this note.  --Tisha Glasgow MD

## 2025-03-17 NOTE — CONSULTS
[x] [facility]  Outpatient Rehabilitation &  Therapy  [address]  [phone]  F: [phone] [] [facility]  Outpatient Rehabilitation &  Therapy  [address]  P: [phone]  F: [phone] [] [facility]  Outpatient Rehabilitation &  Therapy  [address]   P: [phone]  F: [phone] [] [facility] Outpatient Rehabilitation &  Therapy  [address]  P: [phone]  F: [phone]         Speech Language Pathology  Initial Assessment   Date:  3/17/2025  Patient: Mina Gill  : 1940  MRN: [id number]  Physician: Tisha Glasgow MD     [facility] ($30 co pay, Co ins 100% after copay, individual OOP max $4,700 and $4,673.08 remaining, auth required after 12th visit, PT/OT/ST visits combined, BMN)  Medical Diagnosis: G20.A1 (ICD-10-CM) - Parkinson's disease, unspecified whether dyskinesia present, unspecified whether manifestations fluctuate ([facility])     Rehab Codes: R13.12  Onset date: Gradually worsening symptoms since 2024    Next 's appt.: 2025 [facility], device check with Omid Stone MD        Subjective/Patient Report: Pt has known hx of dysphagia. Pt has previously participated in two MBSS on 2024 and 2024 during admissions to the ED. His most recent MBSS indicated pt has a safe swallow for Dysphagia soft and bite/sized (Dysphagia III) (IDDSI Level 6)  diet with thin liquids as evidenced by no observed aspiration noted with consistencies tested. Pt was recommended to have meds presented whole in puree with close supervision and use of compensatory strategies during meals.     Today pt arrived with his son. The patient reports he has intermittent difficulty swallowing both solids and liquids. He reports he is living with friends who are his primary caregivers. He reports his foods are chopped into bite sized pieces and he

## 2025-03-18 ENCOUNTER — HOSPITAL ENCOUNTER (INPATIENT)
Age: 85
LOS: 7 days | Discharge: NURSING, REHAB OR LONG-TERM CARE | DRG: 872 | End: 2025-03-26
Attending: EMERGENCY MEDICINE | Admitting: FAMILY MEDICINE
Payer: COMMERCIAL

## 2025-03-18 ENCOUNTER — APPOINTMENT (OUTPATIENT)
Dept: GENERAL RADIOLOGY | Age: 85
DRG: 872 | End: 2025-03-18
Payer: COMMERCIAL

## 2025-03-18 ENCOUNTER — APPOINTMENT (OUTPATIENT)
Dept: CT IMAGING | Age: 85
DRG: 872 | End: 2025-03-18
Payer: COMMERCIAL

## 2025-03-18 DIAGNOSIS — G20.A1 PARKINSON'S DISEASE WITHOUT FLUCTUATING MANIFESTATIONS, UNSPECIFIED WHETHER DYSKINESIA PRESENT (HCC): ICD-10-CM

## 2025-03-18 DIAGNOSIS — A41.50 GRAM-NEG SEPTICEMIA (HCC): ICD-10-CM

## 2025-03-18 DIAGNOSIS — N17.9 AKI (ACUTE KIDNEY INJURY): ICD-10-CM

## 2025-03-18 DIAGNOSIS — K11.7 DROOLING: ICD-10-CM

## 2025-03-18 DIAGNOSIS — G25.2 RESTING TREMOR: ICD-10-CM

## 2025-03-18 DIAGNOSIS — A41.9 ACUTE SEPSIS (HCC): Primary | ICD-10-CM

## 2025-03-18 DIAGNOSIS — G20.A1 PARKINSON'S DISEASE, UNSPECIFIED WHETHER DYSKINESIA PRESENT, UNSPECIFIED WHETHER MANIFESTATIONS FLUCTUATE (HCC): ICD-10-CM

## 2025-03-18 LAB
ALBUMIN SERPL-MCNC: 4 G/DL (ref 3.5–5.2)
ALBUMIN/GLOB SERPL: 1.3 {RATIO} (ref 1–2.5)
ALP SERPL-CCNC: 113 U/L (ref 40–129)
ALT SERPL-CCNC: 29 U/L (ref 10–50)
ANION GAP SERPL CALCULATED.3IONS-SCNC: 14 MMOL/L (ref 9–16)
AST SERPL-CCNC: 47 U/L (ref 10–50)
BASOPHILS # BLD: 0.03 K/UL (ref 0–0.2)
BASOPHILS NFR BLD: 0 % (ref 0–2)
BILIRUB SERPL-MCNC: 1 MG/DL (ref 0–1.2)
BUN SERPL-MCNC: 28 MG/DL (ref 8–23)
CALCIUM SERPL-MCNC: 9.9 MG/DL (ref 8.6–10.4)
CHLORIDE SERPL-SCNC: 95 MMOL/L (ref 98–107)
CO2 SERPL-SCNC: 21 MMOL/L (ref 20–31)
CREAT SERPL-MCNC: 2.1 MG/DL (ref 0.7–1.2)
EOSINOPHIL # BLD: <0.03 K/UL (ref 0–0.44)
EOSINOPHILS RELATIVE PERCENT: 0 % (ref 1–4)
ERYTHROCYTE [DISTWIDTH] IN BLOOD BY AUTOMATED COUNT: 13.2 % (ref 11.8–14.4)
GFR, ESTIMATED: 30 ML/MIN/1.73M2
GLUCOSE SERPL-MCNC: 279 MG/DL (ref 74–99)
HCT VFR BLD AUTO: 41.5 % (ref 40.7–50.3)
HGB BLD-MCNC: 14.3 G/DL (ref 13–17)
IMM GRANULOCYTES # BLD AUTO: 0.14 K/UL (ref 0–0.3)
IMM GRANULOCYTES NFR BLD: 1 %
LACTIC ACID, WHOLE BLOOD: 2.9 MMOL/L (ref 0.7–2.1)
LYMPHOCYTES NFR BLD: 0.8 K/UL (ref 1.1–3.7)
LYMPHOCYTES RELATIVE PERCENT: 5 % (ref 24–43)
MCH RBC QN AUTO: 31.9 PG (ref 25.2–33.5)
MCHC RBC AUTO-ENTMCNC: 34.5 G/DL (ref 28.4–34.8)
MCV RBC AUTO: 92.6 FL (ref 82.6–102.9)
MONOCYTES NFR BLD: 1.18 K/UL (ref 0.1–1.2)
MONOCYTES NFR BLD: 7 % (ref 3–12)
NEUTROPHILS NFR BLD: 87 % (ref 36–65)
NEUTS SEG NFR BLD: 14.66 K/UL (ref 1.5–8.1)
NRBC BLD-RTO: 0 PER 100 WBC
PLATELET # BLD AUTO: 173 K/UL (ref 138–453)
PMV BLD AUTO: 9 FL (ref 8.1–13.5)
POTASSIUM SERPL-SCNC: 3.4 MMOL/L (ref 3.7–5.3)
PROT SERPL-MCNC: 7 G/DL (ref 6.6–8.7)
RBC # BLD AUTO: 4.48 M/UL (ref 4.21–5.77)
SODIUM SERPL-SCNC: 130 MMOL/L (ref 136–145)
WBC OTHER # BLD: 16.8 K/UL (ref 3.5–11.3)

## 2025-03-18 PROCEDURE — 87077 CULTURE AEROBIC IDENTIFY: CPT

## 2025-03-18 PROCEDURE — 71045 X-RAY EXAM CHEST 1 VIEW: CPT

## 2025-03-18 PROCEDURE — 83605 ASSAY OF LACTIC ACID: CPT

## 2025-03-18 PROCEDURE — 87205 SMEAR GRAM STAIN: CPT

## 2025-03-18 PROCEDURE — 36415 COLL VENOUS BLD VENIPUNCTURE: CPT

## 2025-03-18 PROCEDURE — 96365 THER/PROPH/DIAG IV INF INIT: CPT

## 2025-03-18 PROCEDURE — 74177 CT ABD & PELVIS W/CONTRAST: CPT

## 2025-03-18 PROCEDURE — 6360000004 HC RX CONTRAST MEDICATION

## 2025-03-18 PROCEDURE — 2580000003 HC RX 258

## 2025-03-18 PROCEDURE — 87186 SC STD MICRODIL/AGAR DIL: CPT

## 2025-03-18 PROCEDURE — 87181 SC STD AGAR DILUTION PER AGT: CPT

## 2025-03-18 PROCEDURE — 87154 CUL TYP ID BLD PTHGN 6+ TRGT: CPT

## 2025-03-18 PROCEDURE — 85025 COMPLETE CBC W/AUTO DIFF WBC: CPT

## 2025-03-18 PROCEDURE — 80053 COMPREHEN METABOLIC PANEL: CPT

## 2025-03-18 PROCEDURE — 93005 ELECTROCARDIOGRAM TRACING: CPT

## 2025-03-18 PROCEDURE — 87040 BLOOD CULTURE FOR BACTERIA: CPT

## 2025-03-18 PROCEDURE — 6360000002 HC RX W HCPCS

## 2025-03-18 PROCEDURE — 70450 CT HEAD/BRAIN W/O DYE: CPT

## 2025-03-18 PROCEDURE — 99285 EMERGENCY DEPT VISIT HI MDM: CPT

## 2025-03-18 PROCEDURE — 87184 SC STD DISK METHOD PER PLATE: CPT

## 2025-03-18 RX ORDER — IOPAMIDOL 755 MG/ML
75 INJECTION, SOLUTION INTRAVASCULAR
Status: COMPLETED | OUTPATIENT
Start: 2025-03-18 | End: 2025-03-18

## 2025-03-18 RX ORDER — ROPINIROLE 0.5 MG/1
TABLET, FILM COATED ORAL
Qty: 90 TABLET | Refills: 4 | Status: ON HOLD | OUTPATIENT
Start: 2025-03-18

## 2025-03-18 RX ORDER — 0.9 % SODIUM CHLORIDE 0.9 %
1000 INTRAVENOUS SOLUTION INTRAVENOUS ONCE
Status: COMPLETED | OUTPATIENT
Start: 2025-03-18 | End: 2025-03-18

## 2025-03-18 RX ORDER — FUROSEMIDE 40 MG/1
TABLET ORAL
Qty: 30 TABLET | Refills: 2 | Status: ON HOLD | OUTPATIENT
Start: 2025-03-18

## 2025-03-18 RX ORDER — GABAPENTIN 100 MG/1
CAPSULE ORAL
Qty: 30 CAPSULE | Refills: 1 | Status: ON HOLD | OUTPATIENT
Start: 2025-03-18 | End: 2025-05-17

## 2025-03-18 RX ORDER — ALLOPURINOL 100 MG/1
TABLET ORAL
Qty: 90 TABLET | Refills: 0 | Status: ON HOLD | OUTPATIENT
Start: 2025-03-18

## 2025-03-18 RX ORDER — SUCRALFATE 1 G/1
TABLET ORAL
Qty: 120 TABLET | Refills: 4 | Status: ON HOLD | OUTPATIENT
Start: 2025-03-18

## 2025-03-18 RX ORDER — 0.9 % SODIUM CHLORIDE 0.9 %
1000 INTRAVENOUS SOLUTION INTRAVENOUS ONCE
Status: COMPLETED | OUTPATIENT
Start: 2025-03-18 | End: 2025-03-19

## 2025-03-18 RX ORDER — NOREPINEPHRINE BITARTRATE 0.06 MG/ML
1-100 INJECTION, SOLUTION INTRAVENOUS CONTINUOUS
Status: DISCONTINUED | OUTPATIENT
Start: 2025-03-18 | End: 2025-03-19

## 2025-03-18 RX ADMIN — IOPAMIDOL 75 ML: 755 INJECTION, SOLUTION INTRAVENOUS at 23:00

## 2025-03-18 RX ADMIN — SODIUM CHLORIDE 1000 ML: 9 INJECTION, SOLUTION INTRAVENOUS at 21:14

## 2025-03-18 RX ADMIN — VANCOMYCIN HYDROCHLORIDE 1000 MG: 1 INJECTION, POWDER, LYOPHILIZED, FOR SOLUTION INTRAVENOUS at 23:41

## 2025-03-18 RX ADMIN — SODIUM CHLORIDE 1000 ML: 0.9 INJECTION, SOLUTION INTRAVENOUS at 22:18

## 2025-03-18 ASSESSMENT — LIFESTYLE VARIABLES: HOW OFTEN DO YOU HAVE A DRINK CONTAINING ALCOHOL: NEVER

## 2025-03-18 NOTE — TELEPHONE ENCOUNTER
congestive heart failure (HCC)     Heart block AV complete (HCC)     1st degree AV block     Septicemia (HCC)     Weakness

## 2025-03-18 NOTE — TELEPHONE ENCOUNTER
Pt is requesting refill of:     Medication: (Stalevo 200) carbiopa-levodopa-entacapone    Active on Med List: Yes    Last Office Visit: 2/11/25    Next Office Visit: 8/4/25    Please refill is request is acceptable. Thank you

## 2025-03-18 NOTE — DISCHARGE SUMMARY
CDU Discharge Summary        Patient:  Mina Gill  YOB: 1940    MRN: [id number]   Acct: [id number]    Primary Care Physician: Tisha Glasgow MD    Admit date:  3/6/2025 12:51 PM  Discharge date: 3/7/2025    Discharge Diagnoses:     1.)  Patient had right foot pain with fatigue and weakness onset due to cellulitis of right foot.  Treated with Keflex.  Patient's symptoms are improved with the plan to discharge.    Follow-up:  Call today/tomorrow for a follow up appointment with Tisha Glasgow MD , or return to the Emergency Room with worsening symptoms    Stressed to patient the importance of following up with primary care doctor for further workup/management of symptoms.  Pt verbalizes understanding and agrees with plan.    Discharge Medication Changes:       Medication List        START taking these medications      cephALEXin 500 MG capsule  Commonly known as: KEFLEX  Take 1 capsule by mouth 4 times daily for 5 days            CHANGE how you take these medications      cetirizine 5 MG tablet  Commonly known as: ZYRTEC  Take 1 tablet by mouth daily for 10 days  Start taking on: March 8, 2025  What changed: medication strength            CONTINUE taking these medications      albuterol sulfate  (90 Base) MCG/ACT inhaler  Commonly known as: Proventil HFA  Inhale 2 puffs into the lungs every 6 hours as needed for Wheezing     allopurinol 100 MG tablet  Commonly known as: ZYLOPRIM  TAKE ONE (1) TABLET BY MOUTH ONCE DAILY FOR GOUT     amitriptyline 10 MG tablet  Commonly known as: ELAVIL     amLODIPine 5 MG tablet  Commonly known as: NORVASC     atorvastatin 40 MG tablet  Commonly known as: LIPITOR  TAKE ONE (1) TABLET BY MOUTH ONCE DAILY     carbidopa-levodopa-entacapone -200 MG Tabs per tablet  Commonly known as: STALEVO 200  Take 1 tablet by mouth 3 times daily     clopidogrel 75 MG tablet  Commonly known as: PLAVIX  TAKE 1 TABLET BY MOUTH ONCE DAILY -FOR ANTICOAGULANT     *  [FreeTextEntry1] :  JOSH DIANA, am scribing for and in the presence of  in the following sections: HISTORY OF PRESENT ILLNESS, PAST MEDICAL/FAMILY/SOCIAL HISTORY, REVIEW OF SYSTEMS, VITAL SIGNS, PHYSICAL EXAM, ASSESSMENT/PLAN on 03/18/2025.        and imaging reviewed.     Mina Gill originally presented to the hospital on 3/6/2025 12:51 PM with generalized fatigue, weakness and right foot pain over the last 3 to 4 days.  At that time it was determined that He required further observation and physical therapy evaluation, antibiotics and pain management. Labs and imaging were followed daily.  Imaging results as above.  He is medically stable to be discharged.       Disposition: Home    Patient stated that they will not drive themselves home from the hospital if they have gotten pain killers/ narcotics earlier that day and that they will arrange for transportation on their own or work with the  for a ride. Patient counseled NOT to drive while under the influence of narcotics/ pain killers.     Condition: Good    Patient stable and ready for discharge home. I have discussed plan of care with patient and they are in understanding. They were instructed to read discharge paperwork. All of their questions and concerns were addressed.     Time Spent: 0 day      --  Lainey Blount MD  Observation Physician    This dictation was generated by voice recognition computer software.  Although all attempts are made to edit the dictation for accuracy, there may be errors in the transcription that are not intended.

## 2025-03-19 ENCOUNTER — APPOINTMENT (OUTPATIENT)
Dept: GENERAL RADIOLOGY | Age: 85
DRG: 872 | End: 2025-03-19
Payer: COMMERCIAL

## 2025-03-19 PROBLEM — A41.50 GRAM-NEG SEPTICEMIA (HCC): Status: ACTIVE | Noted: 2025-03-19

## 2025-03-19 PROBLEM — A41.9 ACUTE SEPSIS (HCC): Status: ACTIVE | Noted: 2025-03-19

## 2025-03-19 PROBLEM — A41.9 SEPSIS (HCC): Status: ACTIVE | Noted: 2025-03-19

## 2025-03-19 LAB
ANION GAP SERPL CALCULATED.3IONS-SCNC: 14 MMOL/L (ref 9–16)
B PARAP IS1001 DNA NPH QL NAA+NON-PROBE: NOT DETECTED
B PERT DNA SPEC QL NAA+PROBE: NOT DETECTED
BACTERIA URNS QL MICRO: NORMAL
BASOPHILS # BLD: 0 K/UL (ref 0–0.2)
BASOPHILS NFR BLD: 0 % (ref 0–2)
BILIRUB UR QL STRIP: ABNORMAL
BUN SERPL-MCNC: 28 MG/DL (ref 8–23)
C PNEUM DNA NPH QL NAA+NON-PROBE: NOT DETECTED
CALCIUM SERPL-MCNC: 8.6 MG/DL (ref 8.6–10.4)
CASTS #/AREA URNS LPF: NORMAL /LPF (ref 0–8)
CHLORIDE SERPL-SCNC: 98 MMOL/L (ref 98–107)
CLARITY UR: ABNORMAL
CO2 SERPL-SCNC: 18 MMOL/L (ref 20–31)
COLOR UR: ABNORMAL
CREAT SERPL-MCNC: 1.9 MG/DL (ref 0.7–1.2)
CRP SERPL HS-MCNC: 194 MG/L (ref 0–5)
CRP SERPL HS-MCNC: 294 MG/L (ref 0–5)
EKG ATRIAL RATE: 79 BPM
EKG P-R INTERVAL: 206 MS
EKG Q-T INTERVAL: 432 MS
EKG QRS DURATION: 164 MS
EKG QTC CALCULATION (BAZETT): 504 MS
EKG R AXIS: -77 DEGREES
EKG T AXIS: 96 DEGREES
EKG VENTRICULAR RATE: 82 BPM
EOSINOPHIL # BLD: 0 K/UL (ref 0–0.44)
EOSINOPHILS RELATIVE PERCENT: 0 % (ref 1–4)
EPI CELLS #/AREA URNS HPF: NORMAL /HPF (ref 0–5)
ERYTHROCYTE [DISTWIDTH] IN BLOOD BY AUTOMATED COUNT: 13.5 % (ref 11.8–14.4)
FLUAV RNA NPH QL NAA+NON-PROBE: NOT DETECTED
FLUBV RNA NPH QL NAA+NON-PROBE: NOT DETECTED
GFR, ESTIMATED: 34 ML/MIN/1.73M2
GLUCOSE BLD-MCNC: 235 MG/DL (ref 75–110)
GLUCOSE BLD-MCNC: 258 MG/DL (ref 75–110)
GLUCOSE BLD-MCNC: 286 MG/DL (ref 75–110)
GLUCOSE BLD-MCNC: 310 MG/DL (ref 75–110)
GLUCOSE BLD-MCNC: 332 MG/DL (ref 75–110)
GLUCOSE SERPL-MCNC: 299 MG/DL (ref 74–99)
GLUCOSE UR STRIP-MCNC: ABNORMAL MG/DL
HADV DNA NPH QL NAA+NON-PROBE: NOT DETECTED
HCOV 229E RNA NPH QL NAA+NON-PROBE: NOT DETECTED
HCOV HKU1 RNA NPH QL NAA+NON-PROBE: NOT DETECTED
HCOV NL63 RNA NPH QL NAA+NON-PROBE: NOT DETECTED
HCOV OC43 RNA NPH QL NAA+NON-PROBE: NOT DETECTED
HCT VFR BLD AUTO: 38.6 % (ref 40.7–50.3)
HGB BLD-MCNC: 12.3 G/DL (ref 13–17)
HGB UR QL STRIP.AUTO: ABNORMAL
HIV 1+2 AB+HIV1 P24 AG SERPL QL IA: NONREACTIVE
HMPV RNA NPH QL NAA+NON-PROBE: NOT DETECTED
HPIV1 RNA NPH QL NAA+NON-PROBE: NOT DETECTED
HPIV2 RNA NPH QL NAA+NON-PROBE: NOT DETECTED
HPIV3 RNA NPH QL NAA+NON-PROBE: NOT DETECTED
HPIV4 RNA NPH QL NAA+NON-PROBE: NOT DETECTED
IMM GRANULOCYTES # BLD AUTO: 0.61 K/UL (ref 0–0.3)
IMM GRANULOCYTES NFR BLD: 3 %
KETONES UR STRIP-MCNC: ABNORMAL MG/DL
LACTIC ACID, WHOLE BLOOD: 2.3 MMOL/L (ref 0.7–2.1)
LACTIC ACID, WHOLE BLOOD: 3.1 MMOL/L (ref 0.7–2.1)
LACTIC ACID, WHOLE BLOOD: 3.4 MMOL/L (ref 0.7–2.1)
LEUKOCYTE ESTERASE UR QL STRIP: ABNORMAL
LYMPHOCYTES NFR BLD: 1.22 K/UL (ref 1.1–3.7)
LYMPHOCYTES RELATIVE PERCENT: 6 % (ref 24–43)
M PNEUMO DNA NPH QL NAA+NON-PROBE: NOT DETECTED
MCH RBC QN AUTO: 31.8 PG (ref 25.2–33.5)
MCHC RBC AUTO-ENTMCNC: 31.9 G/DL (ref 28.4–34.8)
MCV RBC AUTO: 99.7 FL (ref 82.6–102.9)
MONOCYTES NFR BLD: 1.22 K/UL (ref 0.1–1.2)
MONOCYTES NFR BLD: 6 % (ref 3–12)
MORPHOLOGY: NORMAL
NEUTROPHILS NFR BLD: 85 % (ref 36–65)
NEUTS SEG NFR BLD: 17.35 K/UL (ref 1.5–8.1)
NITRITE UR QL STRIP: NEGATIVE
NRBC BLD-RTO: 0 PER 100 WBC
PH UR STRIP: 5 [PH] (ref 5–8)
PLATELET # BLD AUTO: 147 K/UL (ref 138–453)
PMV BLD AUTO: 9.1 FL (ref 8.1–13.5)
POTASSIUM SERPL-SCNC: 4.6 MMOL/L (ref 3.7–5.3)
PROT UR STRIP-MCNC: ABNORMAL MG/DL
RBC # BLD AUTO: 3.87 M/UL (ref 4.21–5.77)
RBC #/AREA URNS HPF: NORMAL /HPF (ref 0–4)
RSV RNA NPH QL NAA+NON-PROBE: NOT DETECTED
RV+EV RNA NPH QL NAA+NON-PROBE: NOT DETECTED
SARS-COV-2 RNA NPH QL NAA+NON-PROBE: NOT DETECTED
SODIUM SERPL-SCNC: 130 MMOL/L (ref 136–145)
SP GR UR STRIP: 1.03 (ref 1–1.03)
SPECIMEN DESCRIPTION: NORMAL
T PALLIDUM AB SER QL IA: REACTIVE
TROPONIN I SERPL HS-MCNC: 35 NG/L (ref 0–22)
TROPONIN I SERPL HS-MCNC: 37 NG/L (ref 0–22)
UROBILINOGEN UR STRIP-ACNC: NORMAL EU/DL (ref 0–1)
WBC #/AREA URNS HPF: NORMAL /HPF (ref 0–5)
WBC OTHER # BLD: 20.4 K/UL (ref 3.5–11.3)

## 2025-03-19 PROCEDURE — 6360000002 HC RX W HCPCS

## 2025-03-19 PROCEDURE — 97166 OT EVAL MOD COMPLEX 45 MIN: CPT

## 2025-03-19 PROCEDURE — 36415 COLL VENOUS BLD VENIPUNCTURE: CPT

## 2025-03-19 PROCEDURE — 99213 OFFICE O/P EST LOW 20 MIN: CPT

## 2025-03-19 PROCEDURE — 97535 SELF CARE MNGMENT TRAINING: CPT

## 2025-03-19 PROCEDURE — 2580000003 HC RX 258

## 2025-03-19 PROCEDURE — 80048 BASIC METABOLIC PNL TOTAL CA: CPT

## 2025-03-19 PROCEDURE — 87591 N.GONORRHOEAE DNA AMP PROB: CPT

## 2025-03-19 PROCEDURE — 84484 ASSAY OF TROPONIN QUANT: CPT

## 2025-03-19 PROCEDURE — 93010 ELECTROCARDIOGRAM REPORT: CPT | Performed by: INTERNAL MEDICINE

## 2025-03-19 PROCEDURE — 6370000000 HC RX 637 (ALT 250 FOR IP)

## 2025-03-19 PROCEDURE — 87040 BLOOD CULTURE FOR BACTERIA: CPT

## 2025-03-19 PROCEDURE — 97530 THERAPEUTIC ACTIVITIES: CPT

## 2025-03-19 PROCEDURE — 99222 1ST HOSP IP/OBS MODERATE 55: CPT | Performed by: FAMILY MEDICINE

## 2025-03-19 PROCEDURE — 85025 COMPLETE CBC W/AUTO DIFF WBC: CPT

## 2025-03-19 PROCEDURE — 87491 CHLMYD TRACH DNA AMP PROBE: CPT

## 2025-03-19 PROCEDURE — 6370000000 HC RX 637 (ALT 250 FOR IP): Performed by: FAMILY MEDICINE

## 2025-03-19 PROCEDURE — 83605 ASSAY OF LACTIC ACID: CPT

## 2025-03-19 PROCEDURE — 87186 SC STD MICRODIL/AGAR DIL: CPT

## 2025-03-19 PROCEDURE — 87184 SC STD DISK METHOD PER PLATE: CPT

## 2025-03-19 PROCEDURE — 2500000003 HC RX 250 WO HCPCS

## 2025-03-19 PROCEDURE — 97162 PT EVAL MOD COMPLEX 30 MIN: CPT

## 2025-03-19 PROCEDURE — 93005 ELECTROCARDIOGRAM TRACING: CPT | Performed by: FAMILY MEDICINE

## 2025-03-19 PROCEDURE — 86140 C-REACTIVE PROTEIN: CPT

## 2025-03-19 PROCEDURE — 96367 TX/PROPH/DG ADDL SEQ IV INF: CPT

## 2025-03-19 PROCEDURE — 99222 1ST HOSP IP/OBS MODERATE 55: CPT | Performed by: INTERNAL MEDICINE

## 2025-03-19 PROCEDURE — 87077 CULTURE AEROBIC IDENTIFY: CPT

## 2025-03-19 PROCEDURE — 87205 SMEAR GRAM STAIN: CPT

## 2025-03-19 PROCEDURE — 73620 X-RAY EXAM OF FOOT: CPT

## 2025-03-19 PROCEDURE — 73600 X-RAY EXAM OF ANKLE: CPT

## 2025-03-19 PROCEDURE — 0202U NFCT DS 22 TRGT SARS-COV-2: CPT

## 2025-03-19 PROCEDURE — G0545 PR INHERENT VISIT TO INPT: HCPCS | Performed by: INTERNAL MEDICINE

## 2025-03-19 PROCEDURE — 87086 URINE CULTURE/COLONY COUNT: CPT

## 2025-03-19 PROCEDURE — 81001 URINALYSIS AUTO W/SCOPE: CPT

## 2025-03-19 PROCEDURE — 86780 TREPONEMA PALLIDUM: CPT

## 2025-03-19 PROCEDURE — 87389 HIV-1 AG W/HIV-1&-2 AB AG IA: CPT

## 2025-03-19 PROCEDURE — 86593 SYPHILIS TEST NON-TREP QUANT: CPT

## 2025-03-19 PROCEDURE — 1200000000 HC SEMI PRIVATE

## 2025-03-19 PROCEDURE — 87181 SC STD AGAR DILUTION PER AGT: CPT

## 2025-03-19 PROCEDURE — APPSS30 APP SPLIT SHARED TIME 16-30 MINUTES: Performed by: NURSE PRACTITIONER

## 2025-03-19 PROCEDURE — 82947 ASSAY GLUCOSE BLOOD QUANT: CPT

## 2025-03-19 PROCEDURE — 2580000003 HC RX 258: Performed by: FAMILY MEDICINE

## 2025-03-19 PROCEDURE — 6360000002 HC RX W HCPCS: Performed by: NURSE PRACTITIONER

## 2025-03-19 PROCEDURE — 2580000003 HC RX 258: Performed by: NURSE PRACTITIONER

## 2025-03-19 RX ORDER — ENOXAPARIN SODIUM 100 MG/ML
30 INJECTION SUBCUTANEOUS DAILY
Status: DISCONTINUED | OUTPATIENT
Start: 2025-03-19 | End: 2025-03-25

## 2025-03-19 RX ORDER — 0.9 % SODIUM CHLORIDE 0.9 %
1000 INTRAVENOUS SOLUTION INTRAVENOUS ONCE
Status: DISCONTINUED | OUTPATIENT
Start: 2025-03-19 | End: 2025-03-19

## 2025-03-19 RX ORDER — NITROGLYCERIN 0.4 MG/1
0.4 TABLET SUBLINGUAL EVERY 5 MIN PRN
Status: DISCONTINUED | OUTPATIENT
Start: 2025-03-19 | End: 2025-03-26 | Stop reason: HOSPADM

## 2025-03-19 RX ORDER — GLUCAGON 1 MG/ML
1 KIT INJECTION PRN
Status: DISCONTINUED | OUTPATIENT
Start: 2025-03-19 | End: 2025-03-26 | Stop reason: HOSPADM

## 2025-03-19 RX ORDER — ACETAMINOPHEN 650 MG/1
650 SUPPOSITORY RECTAL EVERY 6 HOURS PRN
Status: DISCONTINUED | OUTPATIENT
Start: 2025-03-19 | End: 2025-03-22

## 2025-03-19 RX ORDER — ONDANSETRON 4 MG/1
4 TABLET, ORALLY DISINTEGRATING ORAL EVERY 8 HOURS PRN
Status: DISCONTINUED | OUTPATIENT
Start: 2025-03-19 | End: 2025-03-26 | Stop reason: HOSPADM

## 2025-03-19 RX ORDER — ENTACAPONE 200 MG/1
200 TABLET ORAL 3 TIMES DAILY
Status: DISCONTINUED | OUTPATIENT
Start: 2025-03-19 | End: 2025-03-20

## 2025-03-19 RX ORDER — ATORVASTATIN CALCIUM 40 MG/1
40 TABLET, FILM COATED ORAL DAILY
Status: DISCONTINUED | OUTPATIENT
Start: 2025-03-19 | End: 2025-03-26 | Stop reason: HOSPADM

## 2025-03-19 RX ORDER — DEXTROSE MONOHYDRATE 100 MG/ML
INJECTION, SOLUTION INTRAVENOUS CONTINUOUS PRN
Status: DISCONTINUED | OUTPATIENT
Start: 2025-03-19 | End: 2025-03-26 | Stop reason: HOSPADM

## 2025-03-19 RX ORDER — CLOPIDOGREL BISULFATE 75 MG/1
75 TABLET ORAL DAILY
Status: DISCONTINUED | OUTPATIENT
Start: 2025-03-19 | End: 2025-03-26 | Stop reason: HOSPADM

## 2025-03-19 RX ORDER — 0.9 % SODIUM CHLORIDE 0.9 %
500 INTRAVENOUS SOLUTION INTRAVENOUS ONCE
Status: COMPLETED | OUTPATIENT
Start: 2025-03-19 | End: 2025-03-19

## 2025-03-19 RX ORDER — AMLODIPINE BESYLATE 5 MG/1
5 TABLET ORAL DAILY
Status: DISCONTINUED | OUTPATIENT
Start: 2025-03-19 | End: 2025-03-26 | Stop reason: HOSPADM

## 2025-03-19 RX ORDER — INSULIN LISPRO 100 [IU]/ML
0-4 INJECTION, SOLUTION INTRAVENOUS; SUBCUTANEOUS
Status: DISCONTINUED | OUTPATIENT
Start: 2025-03-19 | End: 2025-03-19

## 2025-03-19 RX ORDER — GABAPENTIN 100 MG/1
100 CAPSULE ORAL NIGHTLY
Status: DISCONTINUED | OUTPATIENT
Start: 2025-03-19 | End: 2025-03-24

## 2025-03-19 RX ORDER — CARBIDOPA, LEVODOPA AND ENTACAPONE 50; 200; 200 MG/1; MG/1; MG/1
1 TABLET, FILM COATED ORAL 3 TIMES DAILY
Status: DISCONTINUED | OUTPATIENT
Start: 2025-03-19 | End: 2025-03-19

## 2025-03-19 RX ORDER — ACETAMINOPHEN 325 MG/1
650 TABLET ORAL EVERY 6 HOURS PRN
Status: DISCONTINUED | OUTPATIENT
Start: 2025-03-19 | End: 2025-03-22

## 2025-03-19 RX ORDER — FUROSEMIDE 40 MG/1
40 TABLET ORAL DAILY
Status: DISCONTINUED | OUTPATIENT
Start: 2025-03-19 | End: 2025-03-26 | Stop reason: HOSPADM

## 2025-03-19 RX ORDER — SODIUM CHLORIDE 0.9 % (FLUSH) 0.9 %
5-40 SYRINGE (ML) INJECTION EVERY 12 HOURS SCHEDULED
Status: DISCONTINUED | OUTPATIENT
Start: 2025-03-19 | End: 2025-03-26 | Stop reason: HOSPADM

## 2025-03-19 RX ORDER — CARBIDOPA AND LEVODOPA 50; 200 MG/1; MG/1
1 TABLET, EXTENDED RELEASE ORAL 3 TIMES DAILY
Status: DISCONTINUED | OUTPATIENT
Start: 2025-03-19 | End: 2025-03-20

## 2025-03-19 RX ORDER — INSULIN GLARGINE 100 [IU]/ML
5 INJECTION, SOLUTION SUBCUTANEOUS NIGHTLY
Status: DISCONTINUED | OUTPATIENT
Start: 2025-03-19 | End: 2025-03-26 | Stop reason: HOSPADM

## 2025-03-19 RX ORDER — PANTOPRAZOLE SODIUM 40 MG/1
40 TABLET, DELAYED RELEASE ORAL
Status: DISCONTINUED | OUTPATIENT
Start: 2025-03-19 | End: 2025-03-22

## 2025-03-19 RX ORDER — INSULIN LISPRO 100 [IU]/ML
0-8 INJECTION, SOLUTION INTRAVENOUS; SUBCUTANEOUS
Status: DISCONTINUED | OUTPATIENT
Start: 2025-03-19 | End: 2025-03-26 | Stop reason: HOSPADM

## 2025-03-19 RX ORDER — ISOSORBIDE MONONITRATE 30 MG/1
30 TABLET, EXTENDED RELEASE ORAL DAILY
Status: DISCONTINUED | OUTPATIENT
Start: 2025-03-19 | End: 2025-03-26 | Stop reason: HOSPADM

## 2025-03-19 RX ORDER — SUCRALFATE 1 G/1
1 TABLET ORAL EVERY 6 HOURS SCHEDULED
Status: DISCONTINUED | OUTPATIENT
Start: 2025-03-19 | End: 2025-03-26 | Stop reason: HOSPADM

## 2025-03-19 RX ORDER — SODIUM CHLORIDE 9 MG/ML
INJECTION, SOLUTION INTRAVENOUS CONTINUOUS
Status: DISCONTINUED | OUTPATIENT
Start: 2025-03-19 | End: 2025-03-21

## 2025-03-19 RX ORDER — ONDANSETRON 2 MG/ML
4 INJECTION INTRAMUSCULAR; INTRAVENOUS EVERY 6 HOURS PRN
Status: DISCONTINUED | OUTPATIENT
Start: 2025-03-19 | End: 2025-03-26 | Stop reason: HOSPADM

## 2025-03-19 RX ORDER — ROPINIROLE 0.25 MG/1
0.5 TABLET, FILM COATED ORAL 3 TIMES DAILY
Status: DISCONTINUED | OUTPATIENT
Start: 2025-03-19 | End: 2025-03-26 | Stop reason: HOSPADM

## 2025-03-19 RX ORDER — POLYETHYLENE GLYCOL 3350 17 G/17G
17 POWDER, FOR SOLUTION ORAL DAILY PRN
Status: DISCONTINUED | OUTPATIENT
Start: 2025-03-19 | End: 2025-03-26 | Stop reason: HOSPADM

## 2025-03-19 RX ORDER — NEOMYCIN/BACITRACIN/POLYMYXINB 3.5-400-5K
OINTMENT (GRAM) TOPICAL 2 TIMES DAILY
Status: DISCONTINUED | OUTPATIENT
Start: 2025-03-19 | End: 2025-03-26 | Stop reason: HOSPADM

## 2025-03-19 RX ORDER — CARBIDOPA, LEVODOPA AND ENTACAPONE 50; 200; 200 MG/1; MG/1; MG/1
TABLET, FILM COATED ORAL
Qty: 90 TABLET | Refills: 3 | Status: SHIPPED | OUTPATIENT
Start: 2025-03-19

## 2025-03-19 RX ORDER — TAMSULOSIN HYDROCHLORIDE 0.4 MG/1
0.4 CAPSULE ORAL DAILY
Status: DISCONTINUED | OUTPATIENT
Start: 2025-03-19 | End: 2025-03-26 | Stop reason: HOSPADM

## 2025-03-19 RX ORDER — ALBUTEROL SULFATE 90 UG/1
2 INHALANT RESPIRATORY (INHALATION) EVERY 6 HOURS PRN
Status: DISCONTINUED | OUTPATIENT
Start: 2025-03-19 | End: 2025-03-20

## 2025-03-19 RX ORDER — SODIUM CHLORIDE 9 MG/ML
INJECTION, SOLUTION INTRAVENOUS PRN
Status: DISCONTINUED | OUTPATIENT
Start: 2025-03-19 | End: 2025-03-26 | Stop reason: HOSPADM

## 2025-03-19 RX ORDER — SODIUM CHLORIDE 0.9 % (FLUSH) 0.9 %
5-40 SYRINGE (ML) INJECTION PRN
Status: DISCONTINUED | OUTPATIENT
Start: 2025-03-19 | End: 2025-03-26 | Stop reason: HOSPADM

## 2025-03-19 RX ADMIN — POLYMYXIN B SULFATE, BACITRACIN ZINC, NEOMYCIN SULFATE: 5000; 3.5; 4 OINTMENT TOPICAL at 12:24

## 2025-03-19 RX ADMIN — INSULIN LISPRO 4 UNITS: 100 INJECTION, SOLUTION INTRAVENOUS; SUBCUTANEOUS at 18:26

## 2025-03-19 RX ADMIN — BENZOCAINE AND MENTHOL 1 LOZENGE: 15; 3.6 LOZENGE ORAL at 20:26

## 2025-03-19 RX ADMIN — ROPINIROLE HYDROCHLORIDE 0.5 MG: 0.25 TABLET, FILM COATED ORAL at 13:57

## 2025-03-19 RX ADMIN — ENTACAPONE 200 MG: 200 TABLET, FILM COATED ORAL at 20:19

## 2025-03-19 RX ADMIN — ENTACAPONE 200 MG: 200 TABLET, FILM COATED ORAL at 09:48

## 2025-03-19 RX ADMIN — ENTACAPONE 200 MG: 200 TABLET, FILM COATED ORAL at 13:57

## 2025-03-19 RX ADMIN — TAMSULOSIN HYDROCHLORIDE 0.4 MG: 0.4 CAPSULE ORAL at 09:46

## 2025-03-19 RX ADMIN — VANCOMYCIN HYDROCHLORIDE 1000 MG: 1 INJECTION, POWDER, LYOPHILIZED, FOR SOLUTION INTRAVENOUS at 23:12

## 2025-03-19 RX ADMIN — ROPINIROLE HYDROCHLORIDE 0.5 MG: 0.25 TABLET, FILM COATED ORAL at 20:19

## 2025-03-19 RX ADMIN — SODIUM CHLORIDE 500 ML: 0.9 INJECTION, SOLUTION INTRAVENOUS at 12:47

## 2025-03-19 RX ADMIN — SODIUM CHLORIDE: 0.9 INJECTION, SOLUTION INTRAVENOUS at 11:12

## 2025-03-19 RX ADMIN — PIPERACILLIN AND TAZOBACTAM 3375 MG: 3; .375 INJECTION, POWDER, LYOPHILIZED, FOR SOLUTION INTRAVENOUS at 09:57

## 2025-03-19 RX ADMIN — PANTOPRAZOLE SODIUM 40 MG: 40 TABLET, DELAYED RELEASE ORAL at 09:52

## 2025-03-19 RX ADMIN — INSULIN GLARGINE 5 UNITS: 100 INJECTION, SOLUTION SUBCUTANEOUS at 20:19

## 2025-03-19 RX ADMIN — CARBIDOPA AND LEVODOPA 1 TABLET: 50; 200 TABLET, EXTENDED RELEASE ORAL at 20:19

## 2025-03-19 RX ADMIN — CLOPIDOGREL BISULFATE 75 MG: 75 TABLET, FILM COATED ORAL at 09:46

## 2025-03-19 RX ADMIN — INSULIN LISPRO 2 UNITS: 100 INJECTION, SOLUTION INTRAVENOUS; SUBCUTANEOUS at 20:19

## 2025-03-19 RX ADMIN — PIPERACILLIN AND TAZOBACTAM 3375 MG: 3; .375 INJECTION, POWDER, LYOPHILIZED, FOR SOLUTION INTRAVENOUS at 17:19

## 2025-03-19 RX ADMIN — SODIUM CHLORIDE: 0.9 INJECTION, SOLUTION INTRAVENOUS at 13:55

## 2025-03-19 RX ADMIN — POLYMYXIN B SULFATE, BACITRACIN ZINC, NEOMYCIN SULFATE: 5000; 3.5; 4 OINTMENT TOPICAL at 20:20

## 2025-03-19 RX ADMIN — ROPINIROLE HYDROCHLORIDE 0.5 MG: 0.25 TABLET, FILM COATED ORAL at 09:46

## 2025-03-19 RX ADMIN — CARBIDOPA AND LEVODOPA 1 TABLET: 50; 200 TABLET, EXTENDED RELEASE ORAL at 09:48

## 2025-03-19 RX ADMIN — INSULIN LISPRO 3 UNITS: 100 INJECTION, SOLUTION INTRAVENOUS; SUBCUTANEOUS at 09:47

## 2025-03-19 RX ADMIN — SODIUM CHLORIDE, PRESERVATIVE FREE 10 ML: 5 INJECTION INTRAVENOUS at 09:48

## 2025-03-19 RX ADMIN — ENOXAPARIN SODIUM 30 MG: 100 INJECTION SUBCUTANEOUS at 09:47

## 2025-03-19 RX ADMIN — SODIUM CHLORIDE, PRESERVATIVE FREE 10 ML: 5 INJECTION INTRAVENOUS at 20:19

## 2025-03-19 RX ADMIN — CARBIDOPA AND LEVODOPA 1 TABLET: 50; 200 TABLET, EXTENDED RELEASE ORAL at 13:57

## 2025-03-19 RX ADMIN — GABAPENTIN 100 MG: 100 CAPSULE ORAL at 20:19

## 2025-03-19 RX ADMIN — PIPERACILLIN AND TAZOBACTAM 3375 MG: 3; .375 INJECTION, POWDER, LYOPHILIZED, FOR SOLUTION INTRAVENOUS at 01:05

## 2025-03-19 RX ADMIN — SODIUM CHLORIDE: 0.9 INJECTION, SOLUTION INTRAVENOUS at 02:17

## 2025-03-19 RX ADMIN — SUCRALFATE 1 G: 1 TABLET ORAL at 17:16

## 2025-03-19 RX ADMIN — SUCRALFATE 1 G: 1 TABLET ORAL at 12:24

## 2025-03-19 RX ADMIN — INSULIN LISPRO 3 UNITS: 100 INJECTION, SOLUTION INTRAVENOUS; SUBCUTANEOUS at 12:27

## 2025-03-19 RX ADMIN — ATORVASTATIN CALCIUM 40 MG: 40 TABLET, FILM COATED ORAL at 09:46
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
## 2025-03-19 NOTE — PLAN OF CARE
Problem: Chronic Conditions and Co-morbidities  Goal: Patient's chronic conditions and co-morbidity symptoms are monitored and maintained or improved  Outcome: Progressing  Flowsheets (Taken 3/19/2025 0330)  Care Plan - Patient's Chronic Conditions and Co-Morbidity Symptoms are Monitored and Maintained or Improved: Monitor and assess patient's chronic conditions and comorbid symptoms for stability, deterioration, or improvement     Problem: Discharge Planning  Goal: Discharge to home or other facility with appropriate resources  Outcome: Progressing  Flowsheets  Taken 3/19/2025 0340  Discharge to home or other facility with appropriate resources:   Identify barriers to discharge with patient and caregiver   Identify discharge learning needs (meds, wound care, etc)   Refer to discharge planning if patient needs post-hospital services based on physician order or complex needs related to functional status, cognitive ability or social support system   Arrange for needed discharge resources and transportation as appropriate  Taken 3/19/2025 0330  Discharge to home or other facility with appropriate resources: Identify barriers to discharge with patient and caregiver     Problem: Safety - Adult  Goal: Free from fall injury  Outcome: Progressing     Problem: ABCDS Injury Assessment  Goal: Absence of physical injury  Outcome: Progressing     Problem: Skin/Tissue Integrity  Goal: Skin integrity remains intact  Description: 1.  Monitor for areas of redness and/or skin breakdown  2.  Assess vascular access sites hourly  3.  Every 4-6 hours minimum:  Change oxygen saturation probe site  4.  Every 4-6 hours:  If on nasal continuous positive airway pressure, respiratory therapy assess nares and determine need for appliance change or resting period  Outcome: Progressing  Flowsheets (Taken 3/19/2025 0330)  Skin Integrity Remains Intact: Monitor for areas of redness and/or skin breakdown
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
## 2025-03-19 NOTE — ED PROVIDER NOTES
[facility]  FACULTY HANDOFF     11:14 PM EDT  Handoff taken on the following patient from prior Attending Physician:  Pt Name: Mina Gill  PCP:  Tisha Glasgow MD    Attestation  I was available and discussed any additional care issues that arose and coordinated the management plans with the resident(s) caring for the patient during my duty period. Any areas of disagreement with resident's documentation of care or procedures are noted on the chart. I was personally present for the key portions of any/all procedures during my duty period. I have documented in the chart those procedures where I was not present during the key portions.         CHIEF COMPLAINT       Chief Complaint   Patient presents with    Nausea    Vomiting     Zofran per EMS x10 mins PTA         CURRENT MEDICATIONS     Previous Medications  Previous Medications    ALBUTEROL SULFATE HFA (PROVENTIL HFA) 108 (90 BASE) MCG/ACT INHALER    Inhale 2 puffs into the lungs every 6 hours as needed for Wheezing    ALLOPURINOL (ZYLOPRIM) 100 MG TABLET    TAKE ONE (1) TABLET BY MOUTH ONCE DAILY FOR GOUT    AMITRIPTYLINE (ELAVIL) 10 MG TABLET    Take 1 tablet by mouth nightly    AMLODIPINE (NORVASC) 5 MG TABLET        ATORVASTATIN (LIPITOR) 40 MG TABLET    TAKE ONE (1) TABLET BY MOUTH ONCE DAILY    CARBIDOPA-LEVODOPA-ENTACAPONE (STALEVO 200) -200 MG TABS PER TABLET    Take 1 tablet by mouth 3 times daily    CETIRIZINE (ZYRTEC) 5 MG TABLET    Take 1 tablet by mouth daily for 10 days    CLOPIDOGREL (PLAVIX) 75 MG TABLET    TAKE 1 TABLET BY MOUTH ONCE DAILY -FOR ANTICOAGULANT    DICLOFENAC SODIUM (VOLTAREN) 1 % GEL    Apply 4 g topically 4 times daily    DICLOFENAC SODIUM (VOLTAREN) 1 % GEL    Apply 4 g topically 4 times daily    ESOMEPRAZOLE MAGNESIUM (NEXIUM) 20 MG PACK    Take 1 packet by mouth daily    FLUTICASONE (FLONASE) 50 MCG/ACT NASAL SPRAY    2 sprays by Each Nostril route daily    FUROSEMIDE (LASIX) 40 MG TABLET    TAKE
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
## 2025-03-19 NOTE — ED NOTES
Per Dr. Llanes, okay to hold off on levo if MAP is 65 or higher. Report given to Bernadette BERNARD, all questions answered

## 2025-03-19 NOTE — PROGRESS NOTES
Spiritual Health History and Assessment/Progress Note  [facility]    (P) Loneliness/Social Isolation,  ,  ,      Name: Mina Gill MRN: [id number]    Age: 84 y.o.     Sex: male   Language: English   Yazdanism: Oriental orthodox   Sepsis (HCC)     Date: 3/19/2025            Total Time Calculated: (P) 10 min              Spiritual Assessment began in [facility]        Referral/Consult From: (P) Nurse   Encounter Overview/Reason: (P) Loneliness/Social Isolation  Service Provided For: (P) Patient    Writer visited pt per consult for emotional distress. Pt shared that he's been in and out of the hospital and it has been frustrating. He said he used to live a lone but a friend has let him live with her. Pt has three children, two local and one out of state. Pt said he would like to receive Communion and anointing of the sick. He became tearful at end of visit. Writer provided support through active listening and words of affirmation. Writer referred pt to Fr. Perez for anointing.     Melissa, Belief, Meaning:   Patient is connected with a melissa tradition or spiritual practice  Family/Friends No family/friends present      Importance and Influence:  Patient has no beliefs influential to healthcare decision-making identified during this visit  Family/Friends No family/friends present    Community:  Patient Other:    Family/Friends No family/friends present    Assessment and Plan of Care:     Patient Interventions include: Facilitated expression of thoughts and feelings  Family/Friends Interventions include: No family/friends present    Patient Plan of Care: Spiritual Care available upon further referral, referred to   Family/Friends Plan of Care: No family/friends present    Electronically signed by YESSY Solano on 3/19/2025 at 10:58 AM

## 2025-03-19 NOTE — ED NOTES
The following labs were labeled with appropriate pt sticker and tubed to lab:     [] Blue     [] Lavender   [] on ice  [] Green/yellow  [x] Green/black [] on ice  [] Grey  [] on ice  [] Yellow  [] Red  [] Pink  [] Type/ Screen  [] ABG  [] VBG    [] COVID-19 swab    [] Rapid  [] PCR  [] Flu swab  [] Peds Viral Panel     [] Urine Sample  [] Fecal Sample  [] Pelvic Cultures  [x] Blood Cultures  [x] X 2  [] STREP Cultures  [] Wound Cultures

## 2025-03-19 NOTE — PLAN OF CARE
Problem: Chronic Conditions and Co-morbidities  Goal: Patient's chronic conditions and co-morbidity symptoms are monitored and maintained or improved  3/19/2025 1054 by Kristina Lara RN  Outcome: Progressing  3/19/2025 0358 by Zaria Hernández RN  Outcome: Progressing  Flowsheets (Taken 3/19/2025 0330)  Care Plan - Patient's Chronic Conditions and Co-Morbidity Symptoms are Monitored and Maintained or Improved: Monitor and assess patient's chronic conditions and comorbid symptoms for stability, deterioration, or improvement     Problem: Discharge Planning  Goal: Discharge to home or other facility with appropriate resources  3/19/2025 1054 by Kristina Lara RN  Outcome: Progressing  3/19/2025 0358 by Zaria Hernández RN  Outcome: Progressing  Flowsheets  Taken 3/19/2025 0340  Discharge to home or other facility with appropriate resources:   Identify barriers to discharge with patient and caregiver   Identify discharge learning needs (meds, wound care, etc)   Refer to discharge planning if patient needs post-hospital services based on physician order or complex needs related to functional status, cognitive ability or social support system   Arrange for needed discharge resources and transportation as appropriate  Taken 3/19/2025 0330  Discharge to home or other facility with appropriate resources: Identify barriers to discharge with patient and caregiver     Problem: Safety - Adult  Goal: Free from fall injury  3/19/2025 1054 by Kristina Lara RN  Outcome: Progressing  3/19/2025 0358 by Zaria Hernández RN  Outcome: Progressing     Problem: ABCDS Injury Assessment  Goal: Absence of physical injury  3/19/2025 1054 by Kristina Lara RN  Outcome: Progressing  3/19/2025 0358 by Zaria Hernández RN  Outcome: Progressing     Problem: Skin/Tissue Integrity  Goal: Skin integrity remains intact  Description: 1.  Monitor for areas of redness and/or skin breakdown  2.  Assess vascular access sites hourly  3.  Every 4-6 hours  minimum:  Change oxygen saturation probe site  4.  Every 4-6 hours:  If on nasal continuous positive airway pressure, respiratory therapy assess nares and determine need for appliance change or resting period  3/19/2025 1054 by Kristina Lara, RN  Outcome: Progressing  3/19/2025 0358 by Zaria Hernández, RN  Outcome: Progressing  Flowsheets (Taken 3/19/2025 0330)  Skin Integrity Remains Intact: Monitor for areas of redness and/or skin breakdown

## 2025-03-19 NOTE — ED PROVIDER NOTES
[facility] EMERGENCY DEPARTMENT  eMERGENCY dEPARTMENT eNCOUnter   Attending Attestation     Pt Name: Mina Gill  MRN: [id number]  Birthdate 1940  Date of evaluation: 3/18/25       Mina Gill is a 84 y.o. male who presents with Nausea and Vomiting (Zofran per EMS x10 mins PTA)      10:30 PM EDT      History: Patient presents with nausea and vomiting.  Patient received Zofran prior to arrival.  Patient's been weak, patient said he has a headache and some abdominal pain.    Exam: Heart rate and rhythm are regular.  Lungs are clear to auscultation bilaterally.  Abdomen is soft, nontender on my exam however patient does state that he is having pain.  Patient has no pain or grimace on my exam.    Plan for labs, CT abdomen, fluids, patient is hypotensive, initially it was improved however patient had BP drop again.  If pressure does not improve with fluids we will start pressors.  Will get sepsis workup, if there is concern will start antibiotics and admit.  Patient will likely require admission and either way due to hypotension and generalized weakness.    EKG shows AV dual paced rhythm, left axis deviation.  Nonspecific EKG.    I performed a history and physical examination of the patient and discussed management with the resident. I reviewed the resident’s note and agree with the documented findings and plan of care. Any areas of disagreement are noted on the chart. I was personally present for the key portions of any procedures. I have documented in the chart those procedures where I was not present during the key portions. I have personally reviewed all images and agree with the resident's interpretation. I have reviewed the emergency nurses triage note. I agree with the chief complaint, past medical history, past surgical history, allergies, medications, social and family history as documented unless otherwise noted below. Documentation of the HPI, Physical Exam and Medical Decision Making performed  by medical students or scribes is based on my personal performance of the HPI, PE and MDM. For Phys Assistant/ Nurse Practitioner cases/documentation I have had a face to face evaluation of this patient and have completed at least one if not all key elements of the E/M (history, physical exam, and MDM). Additional findings are as noted.    For APC cases I have personally evaluated and examined the patient in conjunction with the APC and agree with the treatment plan and disposition of the patient as recorded by the APC.    Demond Woods MD  Attending Emergency  Physician       eDmond Woods MD  03/18/25 [id number]

## 2025-03-19 NOTE — PROGRESS NOTES
[facility]   Pharmacy Pharmacokinetic Monitoring Service - Vancomycin     Mina Gill is a 84 y.o. male starting on vancomycin therapy for sepsis. Pharmacy consulted by Collette Jimenez NP for monitoring and adjustment.    Target Concentration: Dosing based on anticipated concentration <15 mg/L due to renal impairment/insufficiency    Additional Antimicrobials: zosyn    Pertinent Laboratory Values:   Wt Readings from Last 1 Encounters:   03/19/25 86.4 kg (190 lb 7.6 oz)     Temp Readings from Last 1 Encounters:   03/19/25 98.7 °F (37.1 °C) (Oral)     Estimated Creatinine Clearance: 30 mL/min (A) (based on SCr of 1.9 mg/dL (H)).  Recent Labs     03/18/25  2114 03/19/25  0355   CREATININE 2.1* 1.9*   BUN 28* 28*   WBC 16.8* 20.4*     Procalcitonin:     Pertinent Cultures:  Culture Date Source Results   3/18 blood Gram positive rods   MRSA Nasal Swab: N/A. Non-respiratory infection.    Plan:  Concentration-guided dosing due to renal impairment/insufficiency  Start vancomycin 1000 mg q24h. Pt received 1000 mg in ED 3/18  Anticipated AUC of 568 and trough concentration of 19.3 at steady state  Renal labs as indicated   Vancomycin concentration ordered for 3/20 @ 0600   Pharmacy will continue to monitor patient and adjust therapy as indicated    Thank you for the consult,  Adarsh Barber, PharmD, [facility]  3/19/2025 2:09 PM

## 2025-03-19 NOTE — PROGRESS NOTES
Physical Therapy  Facility/Department: [facility] CAR 2- STEPDOWN   Physical Therapy Initial Evaluation    Patient Name: Mina Gill        MRN: [id number]    : 1940    Date of Service: 3/19/2025    Chief Complaint   Patient presents with    Nausea    Vomiting     Zofran per EMS x10 mins PTA     The patient is a 84 y.o.  male with PMH of Parkinson, CKD stage IIIa (BL Cr 1.4), CAD, NSTEMI, recent RLE foot cellulitis, dysphagia, GERD, SSS/heart block (on pacemaker)  who presented with complaints of weakness, fatigue and hemodynamic instability.    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  Past Surgical History:  has a past surgical history that includes Colonoscopy (2012); shoulder surgery (Right); pr colsc flx w/rmvl of tumor polyp lesion snare tq (N/A, 2017); Colonoscopy (2017); Cholecystectomy, laparoscopic (N/A, 10/29/2022); Esophagogastroduodenoscopy (2023); Upper gastrointestinal endoscopy (N/A, 2023); Upper gastrointestinal endoscopy (N/A, 2023); Cardiac procedure (N/A, 2024); Cardiac procedure (N/A, 8/3/2024); and ep device procedure (N/A, 2024).    Discharge Recommendations   Further therapy recommended at discharge.    PT Equipment Recommendations  Equipment Needed: No    Assessment  Body Structures, Functions, Activity Limitations Requiring Skilled Therapeutic Intervention: Decreased functional mobility , Decreased ADL status,  cesar)  Cognition  Overall Cognitive Status: WFL    Observation/Palpation  Body Mechanics: Noted BUE tremors    AROM RLE (degrees)  RLE AROM: WFL  AROM LLE (degrees)  LLE AROM : Exceptions  LLE General AROM: Hip flexion to approx. 95 deg, otherwise WFL  AROM RUE (degrees)  RUE AROM : WFL  AROM LUE (degrees)  LUE AROM : WFL          Strength RLE  Strength RLE: Exception  R Hip Flexion: 4-/5  R Knee Flexion: 4/5  R Knee Extension: 4/5  R Ankle Dorsiflexion: 4/5  R Ankle Plantar flexion: 4/5  Strength LLE  Strength LLE: Exception  L Hip Flexion: 2+/5  L Knee Flexion: 4-/5  L Knee Extension: 4-/5  L Ankle Dorsiflexion: 4/5  L Ankle Plantar Flexion: 4/5  Strength RUE  Strength RUE: WFL  Comment: appears WFL based on functional mobility, see OT note for details  Strength LUE  Strength LUE: WFL  Comment: appears WFL based on functional mobility, see OT note for details    Mobility   Bed mobility  Supine to Sit: Partial/Moderate assistance;2 Person assistance (for trunk and BLE)  Sit to Supine: Partial/Moderate assistance;2 Person assistance (for trunk and BLE)  Scooting: Partial/Moderate assistance  Bed Mobility Comments: HOB elevated.         Transfers  Comment: Not formally assessed this date d/t pt reporting significant dizziness. BP WFL while pt sitting on EOB, however, pt still continued to report worsening dizziness, so pt was returned to supine.    Ambulation  More Ambulation?: No (not assessed this date d/t pt c/o dizziness)    Stairs/Curb  Stairs?: No          Balance   Balance  Posture: Fair  Sitting - Static: Fair  Sitting - Dynamic: Fair  Comments: Pt sits on EOB x10 minutes with CGA        Patient Education  Patient Education  Education Given To: Patient  Education Provided: Role of Therapy;Plan of Care  Education Method: Verbal  Barriers to Learning: None  Education Outcome: Verbalized understanding;Demonstrated understanding    Plan  Physical Therapy Plan  General Plan:  (5-6x)  Current Treatment

## 2025-03-19 NOTE — H&P
[facility] Medicine Inpatient Service  [facility]  History & Physical Examination Note              Date:   3/19/2025  Patient name:  Mina Gill  Date of admission:  3/18/2025  8:54 PM  MRN:   [id number]  YOB: 1940    CHIEF COMPLAINT:       Chief Complaint   Patient presents with    Nausea    Vomiting     Zofran per EMS x10 mins PTA       History Obtained From:  Patient and chart review.    HPI:     The patient is a 84 y.o.  male with PMH of Parkinson, CKD stage IIIa (BL Cr 1.4), CAD, NSTEMI, recent RLE foot cellulitis, dysphagia, GERD, SSS/heart block (on pacemaker)  who presented with complaints of weakness, fatigue and hemodynamic instability.    Patient presented by EMS to ED with complaints of weakness, fatigue, nausea, and vomiting for past couple of days.  The patient was found to be hypotensive on arrival.  Denies fever, chills, abdominal pain, dysuria, frequency, urgency, pain during urination.  No chest pain, palpitations , shortness of breath, diarrhea, constipation or any other associated symptoms    On examination the patient was at baseline mental status with slurred speech, which is also baseline.  Notable findings included tremors due to underlying Parkinson disease.  Vitals were significant for hypotension requiring fluid resuscitation, mild tachypnea and temperature of 99    Laboratory workup positive for leukocytosis (WBC 16.8), elevated lactate 2.9, repeat 3.1, elevated creatinine 2.1 (1.4).  UA positive for large leukocyte esterase.  Blood culture and urine culture pending.  Chest x-ray CT head and CTAP negative for acute pathology.  Hypokalemia potassium 3.4    The patient received 2 L fluid bolus followed by maintenance fluids resulting in improvement in blood pressure.  Started on empiric coverage with Zosyn and vancomycin.     Patient is being admitted for the management of sepsis, likely secondary to UTI, and acute on chronic kidney injury      PAST  reformatted images are provided for review. Automated exposure control, iterative reconstruction, and/or weight based adjustment of the mA/kV was utilized to reduce the radiation dose to as low as reasonably achievable. COMPARISON: None. HISTORY: ORDERING SYSTEM PROVIDED HISTORY: abdominal pain, n/v TECHNOLOGIST PROVIDED HISTORY: Had contrast study in 2023 w/o pretreatment abdominal pain, n/v Decision Support Exception - unselect if not a suspected or confirmed emergency medical condition->Emergency Medical Condition (MA) Reason for Exam: abdominal pain, n/v FINDINGS: Lower Chest: Dependent bibasilar atelectasis.  Calcified granuloma right lower lobe.  No basilar pleural or pericardial effusion. Organs: Normal liver morphology without focal suspicious mass.  No intra or extrahepatic biliary dilatation.  The gallbladder is surgically absent. Adrenals normal.  Spleen is normal.  The pancreas is normal. The kidneys enhance normally bilaterally without focal suspicious mass.  No renal calculi or hydronephrosis.  The bladder is normal. GI/Bowel: Small hiatal hernia, otherwise the stomach is normal.  The small bowel and colon are nondilated.  The appendix is normal. Pelvis: Enlarged prostate.  Normal seminal vesicles. Peritoneum/Retroperitoneum: The celiac artery, SMA and REJI are patent.  The renal arteries are patent bilaterally.  The portal vein, splenic vein and SMV are patent.  No abdominal or pelvic lymphadenopathy.  No ascites or pneumoperitoneum.  Small fat containing left inguinal hernia. Bones/Soft Tissues: Multilevel degenerative changes of the thoracic and lumbar spine.     No acute finding in the abdomen or pelvis.     CT HEAD WO CONTRAST  Result Date: 3/19/2025  EXAMINATION: CT OF THE HEAD WITHOUT CONTRAST  3/18/2025 10:53 pm TECHNIQUE: CT of the head was performed without the administration of intravenous contrast. Automated exposure control, iterative reconstruction, and/or weight based adjustment of the  mA/kV was utilized to reduce the radiation dose to as low as reasonably achievable. COMPARISON: CT brain 05/17/2023. HISTORY: ORDERING SYSTEM PROVIDED HISTORY: fatigue, weakness TECHNOLOGIST PROVIDED HISTORY: fatigue, weakness Decision Support Exception - unselect if not a suspected or confirmed emergency medical condition->Emergency Medical Condition (MA) Reason for Exam: fatigue, weakness FINDINGS: BRAIN/VENTRICLES: There is no acute intracranial hemorrhage, mass effect or midline shift.  No abnormal extra-axial fluid collection.  The gray-white differentiation is maintained without evidence of an acute infarct.  There is no evidence of hydrocephalus. Mild generalized parenchymal volume loss and chronic periventricular white matter hypoattenuation are seen. ORBITS: The visualized portion of the orbits demonstrate no acute abnormality. SINUSES: The visualized paranasal sinuses and mastoid air cells demonstrate no acute abnormality. SOFT TISSUES/SKULL:  No acute abnormality of the visualized skull or soft tissues.     No acute intracranial abnormality.     XR CHEST PORTABLE  Result Date: 3/6/2025  EXAMINATION: ONE XRAY VIEW OF THE CHEST 3/6/2025 1:34 pm COMPARISON: None. HISTORY: ORDERING SYSTEM PROVIDED HISTORY: CXR TECHNOLOGIST PROVIDED HISTORY: CXR FINDINGS: The lungs appear clear.  The heart and mediastinal structures appear normal. Pacer leads are in good position.  Bony structures are unremarkable.     No acute cardiopulmonary process.         ASSESSMENT:       Principal Problem:    Sepsis (HCC)  Resolved Problems:    * No resolved hospital problems. *      PLAN:     Patient status: Admit the patient as Inpatient observation      Weakness likely 2/2 Sepsis due to UTI vs RLE Wound vs Unknown Etiology  New onset weakness, fatigue, nausea and vomiting on arrival  Hypotension, tachypnea, elevated leukocyte meeting criteria for SIRS  Given 2 L fluid bolus, on 75 mL maintenance hydration, blood pressure
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
## 2025-03-19 NOTE — ED PROVIDER NOTES
[facility] EMERGENCY DEPARTMENT  Emergency Department Encounter  Emergency Medicine Resident     Pt Name:Mina Gill  MRN: [id number]  Birthdate 1940  Date of evaluation: 3/18/25  PCP:  Tisha Glasgow MD  Note Started: 8:57 PM EDT      CHIEF COMPLAINT       Chief Complaint   Patient presents with    Nausea    Vomiting     Zofran per EMS x10 mins PTA       HISTORY OF PRESENT ILLNESS  (Location/Symptom, Timing/Onset, Context/Setting, Quality, Duration, Modifying Factors, Severity.)      Mina Gill is a 84 y.o. male brought in by EMS from home for evaluation of nausea, vomiting.  Home health nurse called 911 due to his vomiting, patient reports that it has been ongoing for 2 days.  He also reports generalized fatigue and weakness.  EMS administered Zofran, patient reports that his nausea has resolved.  He is not complaining of any pain at this time.  He reports that he has not been eating and drinking very much over the last few days due to feeling unwell.  He has had normal urine output and normal bowel movements.  No fevers, chills, chest pain, shortness of breath, abdominal pain.    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 ([facility]), CKD (chronic kidney disease) stage 2, GFR 60-89 ml/min, CKD (chronic kidney disease) stage 3, GFR 30-59 ml/min ([facility]), Diverticulosis of colon, GERD (gastroesophageal reflux disease), History of non-ST elevation myocardial infarction (NSTEMI) 6/2022, Impaired renal function, MRSA (methicillin resistant staph aureus) culture positive, Osteoarthritis of knee, Parkinson disease (HCC), Proteinuria, Skull fracture ([facility]), Temporary loss of eyesight, and Tubular adenoma of colon.     has a past surgical history that includes Colonoscopy (11/02/2012); shoulder surgery (Right); pr colsc flx w/rmvl of tumor polyp lesion snare tq (N/A, 09/19/2017); Colonoscopy (09/19/2017); Cholecystectomy,  None  No acute finding in the abdomen or pelvis.   [JF]   0209 XR CHEST PORTABLE  No acute cardiopulmonary process.   [JF]   0220 Family medicine will place admission orders [JF]      ED Course User Index  [JF] Bernadette Llanes DO       PROCEDURES:    CONSULTS:  IP CONSULT TO FAMILY MEDICINE    CRITICAL CARE:  There was significant risk of life threatening deterioration of patient's condition requiring my direct management. Critical care time 0 minutes, excluding any documented procedures.    FINAL IMPRESSION      1. Acute sepsis (HCC)    2. TAMARA (acute kidney injury)          DISPOSITION / PLAN     DISPOSITION Admitted 03/19/2025 02:21:45 AM               PATIENT REFERRED TO:  No follow-up provider specified.    DISCHARGE MEDICATIONS:  New Prescriptions    No medications on file       Bernadette Llanes DO  Emergency Medicine Resident    (Please note that portions of thisnote were completed with a voice recognition program.  Efforts were made to edit the dictations but occasionally words are mis-transcribed.)

## 2025-03-19 NOTE — CARE COORDINATION
Case Management Assessment  Initial Evaluation    Date/Time of Evaluation: 3/19/2025 5:37 PM  Assessment Completed by: Adelaida Stone RN    If patient is discharged prior to next notation, then this note serves as note for discharge by case management.    Patient Name: Mina Gill                   YOB: 1940  Diagnosis: TAMARA (acute kidney injury) [N17.9]  Sepsis (HCC) [A41.9]  Acute sepsis (HCC) [A41.9]                   Date / Time: 3/18/2025  8:54 PM    Patient Admission Status: Inpatient   Readmission Risk (Low < 19, Mod (19-27), High > 27): Readmission Risk Score: 18.3    Current PCP: Tisha Glasgow MD  PCP verified by CM? (P) Yes    Chart Reviewed: Yes      History Provided by: (P) Patient  Patient Orientation: (P) Alert and Oriented    Patient Cognition: (P) Alert    Hospitalization in the last 30 days (Readmission):  No  previous hospital encounter patient was observation status  If yes, Readmission Assessment in CM Navigator will be completed.    Advance Directives:      Code Status: Full Code   Patient's Primary Decision Maker is: (P) Named in Scanned ACP Document    Primary Decision Maker: Gill,Delvin - Child - [phone]    Secondary Decision Maker: GillIvone - Child - [phone]    Discharge Planning:    Patient lives with: (P) Family Members, Friends Type of Home: (P) House  Primary Care Giver: (P) Self  Patient Support Systems include: (P) Spouse/Significant Other, Children, Friends/Neighbors   Current Financial resources: (P) Medicare, Medicaid  Current community resources:    Current services prior to admission: (P) Durable Medical Equipment, Home Care            Current DME: (P) Cane, Hospital Bed, Shower Chair, Walker, Wheelchair            Type of Home Care services:  (P) Aide Services, OT, PT, Nursing Services    ADLS  Prior functional level: (P) Assistance with the following:, Bathing, Dressing, Cooking, Housework, Shopping  Current functional level: (P) Assistance with

## 2025-03-19 NOTE — ED NOTES
Pt reports to the ED with complaints of N/V x2 days now. Per EMS, pt's home health aids stated he vomited at home and has had some increased weakness, more than baseline. Pt has a hx of Parkinson's and has some baseline weakness, dysarthria and tremors to the BUE. Pt's son does confirm pt's speech is at baseline. EMS also reports pt being hypotensive so they started 1 L NS in route. Pt also admits to a HOLLOWAY to Dr. Woods while writer was in the room. EMS reports giving zofran about 10 mins PTA with relief. EMS also reports home health aids stating pt had a fever so they gave tylenol PTA and upon arrival, pt is afebrile. Per epic, pt also has a hx of NSTEMI, CAD with STENT, HTN, CVA, type 2 DM. Pt states he has a pacemaker. Pt does not have any other complaints at this time. Pt is A&O x4 and speaking in complete sentences. Pt is resting in bed comfortably, NAD noted. Pt denies chest pain or SOB. EKG obtained. Pt placed on full cardiac monitor. Care ongoing

## 2025-03-19 NOTE — ED NOTES
The following labs were labeled with appropriate pt sticker and tubed to lab:     [x] Blue     [x] Lavender   [] on ice  [x] Green/yellow  [] Green/black [] on ice  [] Guzman  [] on ice  [x] Yellow  [] Red  [] Pink  [] Type/ Screen  [] ABG  [] VBG    [] COVID-19 swab    [] Rapid  [] PCR  [] Flu swab  [] Peds Viral Panel     [] Urine Sample  [] Fecal Sample  [] Pelvic Cultures  [] Blood Cultures  [] X 2  [] STREP Cultures  [] Wound Cultures

## 2025-03-20 ENCOUNTER — APPOINTMENT (OUTPATIENT)
Dept: CT IMAGING | Age: 85
DRG: 872 | End: 2025-03-20
Payer: COMMERCIAL

## 2025-03-20 ENCOUNTER — APPOINTMENT (OUTPATIENT)
Dept: GENERAL RADIOLOGY | Age: 85
DRG: 872 | End: 2025-03-20
Payer: COMMERCIAL

## 2025-03-20 ENCOUNTER — APPOINTMENT (OUTPATIENT)
Age: 85
DRG: 872 | End: 2025-03-20
Payer: COMMERCIAL

## 2025-03-20 ENCOUNTER — APPOINTMENT (OUTPATIENT)
Dept: ULTRASOUND IMAGING | Age: 85
DRG: 872 | End: 2025-03-20
Payer: COMMERCIAL

## 2025-03-20 PROBLEM — L97.512: Status: ACTIVE | Noted: 2025-03-20

## 2025-03-20 PROBLEM — L97.509 TYPE 2 DIABETES MELLITUS WITH DIABETIC TOE ULCER (HCC): Status: ACTIVE | Noted: 2025-03-20

## 2025-03-20 PROBLEM — N39.0 URINARY TRACT INFECTION WITHOUT HEMATURIA: Status: ACTIVE | Noted: 2025-03-20

## 2025-03-20 PROBLEM — E11.65 UNCONTROLLED TYPE 2 DIABETES MELLITUS WITH HYPERGLYCEMIA (HCC): Status: ACTIVE | Noted: 2025-03-20

## 2025-03-20 PROBLEM — E87.6 HYPOKALEMIA: Status: ACTIVE | Noted: 2025-03-20

## 2025-03-20 PROBLEM — N34.2 URETHRITIS: Status: ACTIVE | Noted: 2025-03-20

## 2025-03-20 PROBLEM — I49.5 SICK SINUS SYNDROME (HCC): Status: ACTIVE | Noted: 2025-03-20

## 2025-03-20 PROBLEM — J44.9 CHRONIC OBSTRUCTIVE PULMONARY DISEASE (HCC): Status: ACTIVE | Noted: 2025-03-20

## 2025-03-20 PROBLEM — E11.621 TYPE 2 DIABETES MELLITUS WITH DIABETIC TOE ULCER (HCC): Status: ACTIVE | Noted: 2025-03-20

## 2025-03-20 PROBLEM — S81.801A: Status: ACTIVE | Noted: 2025-03-20

## 2025-03-20 LAB
ANION GAP SERPL CALCULATED.3IONS-SCNC: 12 MMOL/L (ref 9–16)
BASOPHILS # BLD: <0.03 K/UL (ref 0–0.2)
BASOPHILS NFR BLD: 0 % (ref 0–2)
BNP SERPL-MCNC: 5122 PG/ML (ref 0–450)
BUN SERPL-MCNC: 25 MG/DL (ref 8–23)
CALCIUM SERPL-MCNC: 8.4 MG/DL (ref 8.6–10.4)
CHLAMYDIA DNA UR QL NAA+PROBE: NEGATIVE
CHLORIDE SERPL-SCNC: 102 MMOL/L (ref 98–107)
CO2 SERPL-SCNC: 16 MMOL/L (ref 20–31)
CREAT SERPL-MCNC: 1.7 MG/DL (ref 0.7–1.2)
CRP SERPL HS-MCNC: 318 MG/L (ref 0–5)
ECHO AO ROOT DIAM: 3.2 CM
ECHO AO ROOT INDEX: 1.62 CM/M2
ECHO AV AREA PEAK VELOCITY: 2.2 CM2
ECHO AV AREA VTI: 2 CM2
ECHO AV AREA/BSA PEAK VELOCITY: 1.1 CM2/M2
ECHO AV AREA/BSA VTI: 1 CM2/M2
ECHO AV MEAN GRADIENT: 2 MMHG
ECHO AV MEAN VELOCITY: 0.7 M/S
ECHO AV PEAK GRADIENT: 5 MMHG
ECHO AV PEAK VELOCITY: 1.1 M/S
ECHO AV VELOCITY RATIO: 0.73
ECHO AV VTI: 20.7 CM
ECHO BSA: 2.02 M2
ECHO EST RA PRESSURE: 15 MMHG
ECHO IVC EXP: 2.3 CM
ECHO IVC INSP: 1.9 CM
ECHO LA AREA 2C: 17.5 CM2
ECHO LA AREA 4C: 21.1 CM2
ECHO LA DIAMETER INDEX: 2.07 CM/M2
ECHO LA DIAMETER: 4.1 CM
ECHO LA MAJOR AXIS: 5.7 CM
ECHO LA MINOR AXIS: 5.3 CM
ECHO LA TO AORTIC ROOT RATIO: 1.28
ECHO LA VOL BP: 56 ML (ref 18–58)
ECHO LA VOL MOD A2C: 48 ML (ref 18–58)
ECHO LA VOL MOD A4C: 61 ML (ref 18–58)
ECHO LA VOL/BSA BIPLANE: 28 ML/M2 (ref 16–34)
ECHO LA VOLUME INDEX MOD A2C: 24 ML/M2 (ref 16–34)
ECHO LA VOLUME INDEX MOD A4C: 31 ML/M2 (ref 16–34)
ECHO LV E' LATERAL VELOCITY: 6.74 CM/S
ECHO LV E' SEPTAL VELOCITY: 7.62 CM/S
ECHO LV EDV A2C: 40 ML
ECHO LV EDV A4C: 59 ML
ECHO LV EDV INDEX A4C: 30 ML/M2
ECHO LV EDV NDEX A2C: 20 ML/M2
ECHO LV EF PHYSICIAN: 5 %
ECHO LV EJECTION FRACTION A2C: 54 %
ECHO LV EJECTION FRACTION A4C: 52 %
ECHO LV EJECTION FRACTION BIPLANE: 54 % (ref 55–100)
ECHO LV ESV A2C: 19 ML
ECHO LV ESV A4C: 28 ML
ECHO LV ESV INDEX A2C: 10 ML/M2
ECHO LV ESV INDEX A4C: 14 ML/M2
ECHO LV FRACTIONAL SHORTENING: 26 % (ref 28–44)
ECHO LV INTERNAL DIMENSION DIASTOLE INDEX: 2.53 CM/M2
ECHO LV INTERNAL DIMENSION DIASTOLIC: 5 CM (ref 4.2–5.9)
ECHO LV INTERNAL DIMENSION SYSTOLIC INDEX: 1.87 CM/M2
ECHO LV INTERNAL DIMENSION SYSTOLIC: 3.7 CM
ECHO LV IVSD: 0.9 CM (ref 0.6–1)
ECHO LV MASS 2D: 146.8 G (ref 88–224)
ECHO LV MASS INDEX 2D: 74.2 G/M2 (ref 49–115)
ECHO LV POSTERIOR WALL DIASTOLIC: 0.8 CM (ref 0.6–1)
ECHO LV RELATIVE WALL THICKNESS RATIO: 0.32
ECHO LVOT AREA: 2.8 CM2
ECHO LVOT AV VTI INDEX: 0.69
ECHO LVOT DIAM: 1.9 CM
ECHO LVOT MEAN GRADIENT: 1 MMHG
ECHO LVOT PEAK GRADIENT: 3 MMHG
ECHO LVOT PEAK VELOCITY: 0.8 M/S
ECHO LVOT STROKE VOLUME INDEX: 20.5 ML/M2
ECHO LVOT SV: 40.5 ML
ECHO LVOT VTI: 14.3 CM
ECHO MV A VELOCITY: 0.67 M/S
ECHO MV AREA VTI: 1.6 CM2
ECHO MV E DECELERATION TIME (DT): 164 MS
ECHO MV E VELOCITY: 0.84 M/S
ECHO MV E/A RATIO: 1.25
ECHO MV E/E' LATERAL: 12.46
ECHO MV E/E' RATIO (AVERAGED): 11.74
ECHO MV E/E' SEPTAL: 11.02
ECHO MV LVOT VTI INDEX: 1.73
ECHO MV MAX VELOCITY: 0.9 M/S
ECHO MV MEAN GRADIENT: 1 MMHG
ECHO MV MEAN VELOCITY: 0.6 M/S
ECHO MV PEAK GRADIENT: 3 MMHG
ECHO MV VTI: 24.7 CM
ECHO PV MAX VELOCITY: 0.8 M/S
ECHO PV PEAK GRADIENT: 2 MMHG
ECHO RIGHT VENTRICULAR SYSTOLIC PRESSURE (RVSP): 54 MMHG
ECHO RV BASAL DIMENSION: 3.4 CM
ECHO RV FREE WALL PEAK S': 10.9 CM/S
ECHO RV TAPSE: 1.8 CM (ref 1.7–?)
ECHO TV REGURGITANT MAX VELOCITY: 3.11 M/S
ECHO TV REGURGITANT PEAK GRADIENT: 39 MMHG
EKG ATRIAL RATE: 208 BPM
EKG Q-T INTERVAL: 456 MS
EKG QRS DURATION: 158 MS
EKG QTC CALCULATION (BAZETT): 492 MS
EKG R AXIS: -75 DEGREES
EKG T AXIS: 101 DEGREES
EKG VENTRICULAR RATE: 70 BPM
EOSINOPHIL # BLD: 0.03 K/UL (ref 0–0.44)
EOSINOPHILS RELATIVE PERCENT: 0 % (ref 1–4)
ERYTHROCYTE [DISTWIDTH] IN BLOOD BY AUTOMATED COUNT: 13.6 % (ref 11.8–14.4)
GFR, ESTIMATED: 39 ML/MIN/1.73M2
GLUCOSE BLD-MCNC: 188 MG/DL (ref 75–110)
GLUCOSE BLD-MCNC: 237 MG/DL (ref 75–110)
GLUCOSE BLD-MCNC: 246 MG/DL (ref 75–110)
GLUCOSE SERPL-MCNC: 172 MG/DL (ref 74–99)
HCT VFR BLD AUTO: 34.6 % (ref 40.7–50.3)
HGB BLD-MCNC: 11.5 G/DL (ref 13–17)
IMM GRANULOCYTES # BLD AUTO: 0.23 K/UL (ref 0–0.3)
IMM GRANULOCYTES NFR BLD: 2 %
LACTIC ACID, WHOLE BLOOD: 1.7 MMOL/L (ref 0.7–2.1)
LYMPHOCYTES NFR BLD: 0.93 K/UL (ref 1.1–3.7)
LYMPHOCYTES RELATIVE PERCENT: 7 % (ref 24–43)
MCH RBC QN AUTO: 32.1 PG (ref 25.2–33.5)
MCHC RBC AUTO-ENTMCNC: 33.2 G/DL (ref 28.4–34.8)
MCV RBC AUTO: 96.6 FL (ref 82.6–102.9)
MONOCYTES NFR BLD: 0.62 K/UL (ref 0.1–1.2)
MONOCYTES NFR BLD: 5 % (ref 3–12)
N GONORRHOEA DNA UR QL NAA+PROBE: NEGATIVE
NEUTROPHILS NFR BLD: 86 % (ref 36–65)
NEUTS SEG NFR BLD: 11.29 K/UL (ref 1.5–8.1)
NRBC BLD-RTO: 0 PER 100 WBC
PLATELET # BLD AUTO: 118 K/UL (ref 138–453)
PMV BLD AUTO: 9.7 FL (ref 8.1–13.5)
POTASSIUM SERPL-SCNC: 3.9 MMOL/L (ref 3.7–5.3)
RBC # BLD AUTO: 3.58 M/UL (ref 4.21–5.77)
SODIUM SERPL-SCNC: 130 MMOL/L (ref 136–145)
SPECIMEN DESCRIPTION: NORMAL
VANCOMYCIN SERPL-MCNC: 11.8 UG/ML (ref 5–40)
WBC OTHER # BLD: 13.1 K/UL (ref 3.5–11.3)

## 2025-03-20 PROCEDURE — 99232 SBSQ HOSP IP/OBS MODERATE 35: CPT | Performed by: FAMILY MEDICINE

## 2025-03-20 PROCEDURE — 82947 ASSAY GLUCOSE BLOOD QUANT: CPT

## 2025-03-20 PROCEDURE — 6370000000 HC RX 637 (ALT 250 FOR IP)

## 2025-03-20 PROCEDURE — 1200000000 HC SEMI PRIVATE

## 2025-03-20 PROCEDURE — 80202 ASSAY OF VANCOMYCIN: CPT

## 2025-03-20 PROCEDURE — 36415 COLL VENOUS BLD VENIPUNCTURE: CPT

## 2025-03-20 PROCEDURE — 97530 THERAPEUTIC ACTIVITIES: CPT

## 2025-03-20 PROCEDURE — 86738 MYCOPLASMA ANTIBODY: CPT

## 2025-03-20 PROCEDURE — 92611 MOTION FLUOROSCOPY/SWALLOW: CPT

## 2025-03-20 PROCEDURE — 6360000002 HC RX W HCPCS

## 2025-03-20 PROCEDURE — G0545 PR INHERENT VISIT TO INPT: HCPCS | Performed by: INTERNAL MEDICINE

## 2025-03-20 PROCEDURE — 80048 BASIC METABOLIC PNL TOTAL CA: CPT

## 2025-03-20 PROCEDURE — 85025 COMPLETE CBC W/AUTO DIFF WBC: CPT

## 2025-03-20 PROCEDURE — 2580000003 HC RX 258

## 2025-03-20 PROCEDURE — 83605 ASSAY OF LACTIC ACID: CPT

## 2025-03-20 PROCEDURE — 94640 AIRWAY INHALATION TREATMENT: CPT

## 2025-03-20 PROCEDURE — 71250 CT THORAX DX C-: CPT

## 2025-03-20 PROCEDURE — 6360000002 HC RX W HCPCS: Performed by: NURSE PRACTITIONER

## 2025-03-20 PROCEDURE — 97110 THERAPEUTIC EXERCISES: CPT

## 2025-03-20 PROCEDURE — 93306 TTE W/DOPPLER COMPLETE: CPT | Performed by: INTERNAL MEDICINE

## 2025-03-20 PROCEDURE — 93306 TTE W/DOPPLER COMPLETE: CPT

## 2025-03-20 PROCEDURE — 99222 1ST HOSP IP/OBS MODERATE 55: CPT | Performed by: INTERNAL MEDICINE

## 2025-03-20 PROCEDURE — 76870 US EXAM SCROTUM: CPT

## 2025-03-20 PROCEDURE — 92610 EVALUATE SWALLOWING FUNCTION: CPT

## 2025-03-20 PROCEDURE — 74230 X-RAY XM SWLNG FUNCJ C+: CPT

## 2025-03-20 PROCEDURE — 6360000002 HC RX W HCPCS: Performed by: INTERNAL MEDICINE

## 2025-03-20 PROCEDURE — 2500000003 HC RX 250 WO HCPCS

## 2025-03-20 PROCEDURE — 83880 ASSAY OF NATRIURETIC PEPTIDE: CPT

## 2025-03-20 PROCEDURE — 99222 1ST HOSP IP/OBS MODERATE 55: CPT | Performed by: PODIATRIST

## 2025-03-20 PROCEDURE — 71045 X-RAY EXAM CHEST 1 VIEW: CPT

## 2025-03-20 PROCEDURE — 86140 C-REACTIVE PROTEIN: CPT

## 2025-03-20 PROCEDURE — 99999 PR OFFICE/OUTPT VISIT,PROCEDURE ONLY: CPT | Performed by: PODIATRIST

## 2025-03-20 PROCEDURE — 93010 ELECTROCARDIOGRAM REPORT: CPT | Performed by: INTERNAL MEDICINE

## 2025-03-20 PROCEDURE — 99231 SBSQ HOSP IP/OBS SF/LOW 25: CPT | Performed by: PODIATRIST

## 2025-03-20 PROCEDURE — 99232 SBSQ HOSP IP/OBS MODERATE 35: CPT | Performed by: INTERNAL MEDICINE

## 2025-03-20 PROCEDURE — 2580000003 HC RX 258: Performed by: NURSE PRACTITIONER

## 2025-03-20 RX ORDER — ALBUTEROL SULFATE 0.83 MG/ML
2.5 SOLUTION RESPIRATORY (INHALATION) EVERY 6 HOURS PRN
Status: DISCONTINUED | OUTPATIENT
Start: 2025-03-20 | End: 2025-03-26 | Stop reason: HOSPADM

## 2025-03-20 RX ORDER — ENTACAPONE 200 MG/1
200 TABLET ORAL 3 TIMES DAILY
Status: DISCONTINUED | OUTPATIENT
Start: 2025-03-20 | End: 2025-03-26 | Stop reason: HOSPADM

## 2025-03-20 RX ORDER — FUROSEMIDE 10 MG/ML
40 INJECTION INTRAMUSCULAR; INTRAVENOUS ONCE
Status: DISCONTINUED | OUTPATIENT
Start: 2025-03-20 | End: 2025-03-20

## 2025-03-20 RX ORDER — FUROSEMIDE 10 MG/ML
20 INJECTION INTRAMUSCULAR; INTRAVENOUS DAILY
Status: DISCONTINUED | OUTPATIENT
Start: 2025-03-20 | End: 2025-03-21

## 2025-03-20 RX ORDER — CARBIDOPA AND LEVODOPA 25; 100 MG/1; MG/1
2 TABLET ORAL 3 TIMES DAILY
Status: DISCONTINUED | OUTPATIENT
Start: 2025-03-20 | End: 2025-03-26 | Stop reason: HOSPADM

## 2025-03-20 RX ADMIN — SUCRALFATE 1 G: 1 TABLET ORAL at 17:44

## 2025-03-20 RX ADMIN — CARBIDOPA AND LEVODOPA 1 TABLET: 50; 200 TABLET, EXTENDED RELEASE ORAL at 09:50

## 2025-03-20 RX ADMIN — ROPINIROLE HYDROCHLORIDE 0.5 MG: 0.25 TABLET, FILM COATED ORAL at 13:50

## 2025-03-20 RX ADMIN — PIPERACILLIN AND TAZOBACTAM 3375 MG: 3; .375 INJECTION, POWDER, LYOPHILIZED, FOR SOLUTION INTRAVENOUS at 17:49

## 2025-03-20 RX ADMIN — ENTACAPONE 200 MG: 200 TABLET, FILM COATED ORAL at 09:50

## 2025-03-20 RX ADMIN — SODIUM CHLORIDE: 0.9 INJECTION, SOLUTION INTRAVENOUS at 00:04

## 2025-03-20 RX ADMIN — SODIUM CHLORIDE, PRESERVATIVE FREE 10 ML: 5 INJECTION INTRAVENOUS at 09:49

## 2025-03-20 RX ADMIN — INSULIN LISPRO 2 UNITS: 100 INJECTION, SOLUTION INTRAVENOUS; SUBCUTANEOUS at 18:07

## 2025-03-20 RX ADMIN — TAMSULOSIN HYDROCHLORIDE 0.4 MG: 0.4 CAPSULE ORAL at 09:48

## 2025-03-20 RX ADMIN — CLOPIDOGREL BISULFATE 75 MG: 75 TABLET, FILM COATED ORAL at 09:48

## 2025-03-20 RX ADMIN — CARBIDOPA AND LEVODOPA 1 TABLET: 50; 200 TABLET, EXTENDED RELEASE ORAL at 13:50

## 2025-03-20 RX ADMIN — SUCRALFATE 1 G: 1 TABLET ORAL at 13:50

## 2025-03-20 RX ADMIN — INSULIN LISPRO 2 UNITS: 100 INJECTION, SOLUTION INTRAVENOUS; SUBCUTANEOUS at 09:55

## 2025-03-20 RX ADMIN — POLYMYXIN B SULFATE, BACITRACIN ZINC, NEOMYCIN SULFATE: 5000; 3.5; 4 OINTMENT TOPICAL at 09:50

## 2025-03-20 RX ADMIN — ENTACAPONE 200 MG: 200 TABLET, FILM COATED ORAL at 21:38

## 2025-03-20 RX ADMIN — INSULIN GLARGINE 5 UNITS: 100 INJECTION, SOLUTION SUBCUTANEOUS at 21:47

## 2025-03-20 RX ADMIN — SODIUM CHLORIDE, PRESERVATIVE FREE 10 ML: 5 INJECTION INTRAVENOUS at 21:48

## 2025-03-20 RX ADMIN — CARBIDOPA AND LEVODOPA 2 TABLET: 25; 100 TABLET ORAL at 21:38

## 2025-03-20 RX ADMIN — ENOXAPARIN SODIUM 30 MG: 100 INJECTION SUBCUTANEOUS at 09:49

## 2025-03-20 RX ADMIN — INSULIN LISPRO 2 UNITS: 100 INJECTION, SOLUTION INTRAVENOUS; SUBCUTANEOUS at 21:47

## 2025-03-20 RX ADMIN — ALBUTEROL SULFATE 2.5 MG: 2.5 SOLUTION RESPIRATORY (INHALATION) at 20:43

## 2025-03-20 RX ADMIN — GABAPENTIN 100 MG: 100 CAPSULE ORAL at 21:38

## 2025-03-20 RX ADMIN — ENTACAPONE 200 MG: 200 TABLET, FILM COATED ORAL at 13:52

## 2025-03-20 RX ADMIN — ATORVASTATIN CALCIUM 40 MG: 40 TABLET, FILM COATED ORAL at 09:48

## 2025-03-20 RX ADMIN — Medication 5 MG: at 22:20

## 2025-03-20 RX ADMIN — FUROSEMIDE 20 MG: 10 INJECTION, SOLUTION INTRAMUSCULAR; INTRAVENOUS at 17:58

## 2025-03-20 RX ADMIN — INSULIN LISPRO 2 UNITS: 100 INJECTION, SOLUTION INTRAVENOUS; SUBCUTANEOUS at 11:30

## 2025-03-20 RX ADMIN — SODIUM CHLORIDE: 0.9 INJECTION, SOLUTION INTRAVENOUS at 10:39

## 2025-03-20 RX ADMIN — ROPINIROLE HYDROCHLORIDE 0.5 MG: 0.25 TABLET, FILM COATED ORAL at 21:38

## 2025-03-20 RX ADMIN — PANTOPRAZOLE SODIUM 40 MG: 40 TABLET, DELAYED RELEASE ORAL at 09:55

## 2025-03-20 RX ADMIN — SUCRALFATE 1 G: 1 TABLET ORAL at 00:03

## 2025-03-20 RX ADMIN — PIPERACILLIN AND TAZOBACTAM 3375 MG: 3; .375 INJECTION, POWDER, LYOPHILIZED, FOR SOLUTION INTRAVENOUS at 02:05

## 2025-03-20 RX ADMIN — POLYMYXIN B SULFATE, BACITRACIN ZINC, NEOMYCIN SULFATE: 5000; 3.5; 4 OINTMENT TOPICAL at 21:52

## 2025-03-20 RX ADMIN — PIPERACILLIN AND TAZOBACTAM 3375 MG: 3; .375 INJECTION, POWDER, LYOPHILIZED, FOR SOLUTION INTRAVENOUS at 10:05

## 2025-03-20 RX ADMIN — ROPINIROLE HYDROCHLORIDE 0.5 MG: 0.25 TABLET, FILM COATED ORAL at 09:48

## 2025-03-20 ASSESSMENT — PAIN SCALES - GENERAL: PAINLEVEL_OUTOF10: 0

## 2025-03-20 NOTE — PROGRESS NOTES
[facility]   Pharmacy Pharmacokinetic Monitoring Service - Vancomycin    Consulting Provider: JW Reeves CNP   Indication: Sepsis  Target Concentration: Goal AUC/LAZARUS 400-600 mg*hr/L  Day of Therapy: 3  Additional Antimicrobials: Zosyn    Pertinent Laboratory Values:   Wt Readings from Last 1 Encounters:   03/19/25 86.4 kg (190 lb 7.6 oz)     Temp Readings from Last 1 Encounters:   03/20/25 99.7 °F (37.6 °C) (Oral)     Estimated Creatinine Clearance: 33 mL/min (A) (based on SCr of 1.7 mg/dL (H)).  Recent Labs     03/19/25  0355 03/20/25  0551   CREATININE 1.9* 1.7*   BUN 28* 25*   WBC 20.4* 13.1*     Pertinent Cultures:  Culture Date Source Results   3/18/25 Blood 1/2 G- rods   3/19/25 Blood 1/2 Enterobacterales (not E. cloacae complex, E. coli, K. oxytoca, K. pneumoniae, S. marcescens, or Proteus species)    MRSA Nasal Swab: N/A. Non-respiratory infection.    Recent vancomycin administrations                     vancomycin (VANCOCIN) 1,000 mg in sodium chloride 0.9 % 250 mL IVPB ([id number]) (mg) 1,000 mg New Bag 03/19/25 2312    vancomycin (VANCOCIN) 1,000 mg in sodium chloride 0.9 % 250 mL IVPB ([id number]) (mg) 1,000 mg New Bag 03/18/25 2341             Assessment:  Date/Time Current Dose Concentration Timing of Concentration (h) AUC   3/20/25 @ 0551  1000 mg every 24 hours 11.8 mg/L ~6.5 h post-dose 521   Note: Serum concentrations collected for AUC dosing may appear elevated if collected in close proximity to the dose administered, this is not necessarily an indication of toxicity    Plan:  Current dosing regimen is therapeutic  Continue current dose  Repeat vancomycin concentration will be ordered as clinically indicated   Pharmacy will continue to monitor patient and adjust therapy as indicated    Thank you for the consult,  Connie Martinez RPH  3/20/2025 8:45 AM

## 2025-03-20 NOTE — PROCEDURES
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  Past Surgical History:  has a past surgical history that includes Colonoscopy (2012); shoulder surgery (Right); pr colsc flx w/rmvl of tumor polyp lesion snare tq (N/A, 2017); Colonoscopy (2017); Cholecystectomy, laparoscopic (N/A, 10/29/2022); Esophagogastroduodenoscopy (2023); Upper gastrointestinal endoscopy (N/A, 2023); Upper gastrointestinal endoscopy (N/A, 2023); Cardiac procedure (N/A, 2024); Cardiac procedure (N/A, 8/3/2024); and ep device procedure (N/A, 2024).      Type of Study: Initial MBS (Initial during this hospital admission)  Results of Prior Study: 24: Dysphagia soft and bite/sized (Dysphagia III) (IDDSI Level 6)  diet with thin liquids    Recent CXR/CT of Chest: 3/20/25 CT CHEST WO CONTRAST   IMPRESSION:  1. Cardiomegaly with interstitial edema pattern and small right and trace to  small left pleural effusions.  No separate consolidation.  2. Small hiatal hernia.    Patient Complaints/Reason for Referral:  Mina Gill was referred for a MBS to assess the efficiency of his/her swallow function, assess for aspiration, and to make recommendations regarding safe dietary consistencies, effective compensatory strategies, and safe eating  hospitalization;Ongoing speech therapy is recommended at next level of care  Postural Changes and/or Swallow Maneuvers: Upright 90 degrees      Recommended Exercises:    Therapeutic Interventions: Patient/Family education;Pharyngeal exercises;Oral motor exercises;Oral care;Diet tolerance monitoring;Bolus control exercises;Laryngeal exercises       Education: Images and recommendations were reviewed with pt and RN following this exam.   Patient Education: Yes  Patient Education Response: Needs reinforcement;Verbalizes understanding    Safety Devices  Restraints Initially in Place: No      Goals:    Long Term: To maintain adequate hydration/nutrition and maximize airway protection.     Short Term:  Dysphagia Goals: The patient will tolerate recommended diet without observed clinical signs of aspiration;The patient will improve oral motor function via therapeutic oral motor exercises to 5/5 each trial.;The patient/caregiver will demonstrate understanding of compensatory strategies for improved swallowing safety.      Oral Preparation / Oral Phase  Decreased oral manipulation with moderate-severely prolonged bolus holding and tongue pumping with oral loss and BOT residual.           Pharyngeal Phase   Dysphagia Pureed (Dysphagia I) (IDDS1 Level 4): no penetration no aspiration no residual  Dysphagia Soft and Bite-Sized (Dysphagia III) (IDDSI Level 6): removal from oral cavity via lingual sweep, did not progress past oral phase.   Thin liquids (IDDSI Level 0): no penetration no aspiration no residual  Mildly Thick (Nectar) (IDDSI Level 2) cup: + trace penetration no aspiration no residual    Esophageal Phase  Esophageal Screen: WFL        Pain      Pain Level: 0      Therapy Time:   Individual Concurrent Group Co-treatment   Time In 1545-         Time Out 1557         Minutes 12                   SERGEY Tamez, 3/20/2025, 4:07 PM

## 2025-03-20 NOTE — PLAN OF CARE
Patient seen and evaluated bedside.  ANO x3.  Vital stable, saturating on room air.       Sepsis secondary to UTI  Patient given 2 more boluses this morning, on maintenance fluids.    Leukocyte trending up 16.8 > 20  Lactate trending down now 2.9>3.1>3.4 >>2.3.   CRP trending up 194>294  ID on board, continuing with Vanco and Zosyn  Urine culture pending  Blood culture positive for Enterobacter Group  Given bacteremia & pacemaker, concerns for IE, TTE ordered.  Meeting Cuenca criteria, positive blood culture (major) as well as predisposing heart condition placement(minor)      T2DM: Glucose running high, 279.  Patient on 5 units Lantus and medium dose sliding scale.  POCT checks    Elevated Trops: 35, repeat 37.  No associated symptoms including chest pain or palpitations, cardiology consulted    Acute on Chronic Kidney Injury: Cr 2.1 > 1.9.  Patient on maintenance fluids    Right lower extremity cellulitis//wound; concerns for osteomyelitis wound care and podiatry on board.  Per podiatry infection less likely coming from foot.  No bedside debridement needed.  X-ray right foot positive for soft tissue swelling in distal and midportion of dorsum and sole of right foot, indicating edema and probable cellulitis without any abnormal gas  Will consider ordering MRI if worsening    Parkinson, continuing with Requip, carbidopa/levodopa and entacapone, gabapentin       Matheus Gómez MD  Family Medicine Reident - PGY1  [facility], [place]  3/19/2025. 9:01 PM
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
## 2025-03-20 NOTE — CARE COORDINATION
Case Management   Daily Progress Note       Patient Name: Mina Gill                   YOB: 1940  Diagnosis: TAMARA (acute kidney injury) [N17.9]  Sepsis (HCC) [A41.9]  Acute sepsis (HCC) [A41.9]                       GMLOS: 3.5 days  Length of Stay: 1  days    Anticipated Discharge Date: Two or more days before discharge    Readmission Risk (Low < 19, Mod (19-27), High > 27): Readmission Risk Score: 18.6      Patient Transitional Goal: Home    Current Transitional Plan    [] Home Independently    [x] Home with HC    [] Skilled Nursing Facility    [] Acute Rehabilitation    [] Long Term Acute Care (LTAC)    [] Other:     Plan for the Stay (Medical Management) : positive blood culture, new au, CRP elevated      Workflow Continuation (Additional Notes) : spoke to Ashley from [facility]. They are able to resume services at discharge. Notified her that patient may need IV atb. They are able to accommodate as long as patient has teachable caregiver. Voicemail left for patient's son, Delvin, requesting return call. Referral sent to [facility]    [id number] met with patient's son, Tato, and his caregiver, Lora, at bedside to discuss IV atb. They are able to administer IV atb at home         Adelaida Stone RN  March 20, 2025

## 2025-03-20 NOTE — CONSULTS
[facility]    Consult / H&P               Today's Date: 3/20/2025  Patient Name: Mina Gill  Date of admission: 3/18/2025  8:54 PM  Patient's age: 84 y.o., 1940  Admission Dx: TAMARA (acute kidney injury) [N17.9]  Sepsis (HCC) [A41.9]  Acute sepsis (HCC) [A41.9]    Requesting Physician: Trevin Calvert MD    Cardiac Evaluation Reason: Concern of infective endocarditis    History Obtained From: patient/chart review     History of Present Illness:    This patient 84 y.o. years old male with past medical history of complete heart block with in situ PPM, coronary artery disease with history of PCI, CKD who presented to hospital with fatigue and weakness.  Patient was noted to be hypotensive on arrival to ER.  Patient received IV fluid with improvement in blood pressure.  He was empirically started on antibiotics.  Cardiology has been consulted today to evaluate for concern of infective endocarditis.  Patient does have right foot cellulitis with gram-negative septicemia.  EKG is AV paced.  Patient have slurred speech at baseline.  Denies any current complaints.  Infectious disease has seen the patient and patient is on antibiotics.  Infectious disease has not requested for STAN.    Past Medical History:   has a past medical history of 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 ([facility]), CKD (chronic kidney disease) stage 2, GFR 60-89 ml/min, CKD (chronic kidney disease) stage 3, GFR 30-59 ml/min ([facility]), Diverticulosis of colon, GERD (gastroesophageal reflux disease), History of non-ST elevation myocardial infarction (NSTEMI) 6/2022, Impaired renal function, MRSA (methicillin resistant staph aureus) culture positive, Osteoarthritis of knee, Parkinson disease ([facility]), Proteinuria, Skull fracture ([facility]), Temporary loss of eyesight, and Tubular adenoma of colon.    Past Surgical History:   has a past surgical history that includes Colonoscopy (11/02/2012); shoulder surgery (Right); pr colsc flx  03/07/2024  3:23 PM (Final)    Interpretation Summary    ECG: Resting ECG demonstrates normal sinus rhythm and incomplete right bundle branch block.    ECG: The ECG was negative for ischemia.    Stress Test: A pharmacological stress test was performed using lexiscan. PO caffeine given as a reversal agent. Blood pressure demonstrated a normal response and heart rate demonstrated a normal response to stress.  Impression  [Normal study.]  Risk stratification: [Low]  Author: Delvin Gill MD       LAST CATH:   Results for orders placed or performed during the hospital encounter of 06/07/22   Cardiac Catheterization    Narrative    Cardiac Diagnostic Report     Demographics      Patient   ALIX JAUREGUI         Date of Study       06/08/2022   Name      Date of   1940            Gender              Male   Birth      Age       81 year(s)            Race                Other      Room      [id number]^[facility]  Height:             67 inch, 170.18 cm   Number      Corporate [id number]              Weight:             170 pounds, 77.1 kg   ID #      Patient   [id number]             BSA:      1.89 m^2  BMI:       26.63   Acct #                                                         kg/m^2      MR #      [id number]               Performing          Pao Wen                                   Physician      Accession [id number]            Referring   #                               Physician      Case ID # [id number]                 Assisting                                   Physician     Additional Comments  ASA & Mallampati documented in Epic by Physician.  Mallampati Classificatio Dr Wen : per Physician .    Procedure  Procedure Type:     Diagnostic procedure: Coronary angiography, Ultrasound used for access -   Femoral     Complications:    - No complication    Indications:    - Recurrent angina in known coronary disease      - Cardiac catheterization indicating PCI     Conclusions      Procedure Summary      1.  Single vessel coronary artery disease.   2. Patent stent in ostial LAD   3. Mild Nonobstructive CAD otherwise   4. LV gram not done due to chronic renal insufficiency      Recommendations      Routine Post Diagnostic Cath orders.   Medical therapy as needed. Add Imdur.   Risk factor modification.      Signature      ----------------------------------------------------------------   Electronically signed by Pao Wen(Performing   Physician) on 06/08/2022 19:09   ----------------------------------------------------------------      Angiographic Findings      Cardiac Arteries and Lesion Findings     LMCA: Normal 0% stenosis.    LAD: Patent stent in ostial-proximal area with 10-20% in stent restenosis  Mid to distal vessel is small with diffuse 40% stenosis, somewhat improved  after IC nitro.    LCx: Mild irregularities 10-20%.    RCA: Mild irregularities 10-20%.    Cardiac collaterals  +----------------+---------------------+-------------+---------------------+  !Origin          !Destination          !Flow         !Comments             !  +----------------+---------------------+-------------+---------------------+  !R PDA           !Dist LAD             !             !                     !  +----------------+---------------------+-------------+---------------------+     Coronary Tree      Dominance: Right     Procedure Data  Procedure Start Time: 06/08/2022 18:34. Procedure End Time: 06/08/2022  18:45.    The procedure was explained in detail to the patient. Risks, complications  and alternative treatments were reviewed. Written consent was obtained.    Diagnostic Cath Status: Urgent    Entry Locations    - Retrograde Percutaneous access was performed through the Right Femoral      artery. A 6 Fr sheath was inserted. Hemostasis was successfully obtained      using Mynx (Access).    Procedure Medications:    - Solumedrol I.V. 125 mg.      - Benadryl I.V. 50 mg.      - Nitroglycerin I.C. 200 mcg.    Catheters

## 2025-03-20 NOTE — PROGRESS NOTES
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  Past Surgical History:  has a past surgical history that includes Colonoscopy (2012); shoulder surgery (Right); pr colsc flx w/rmvl of tumor polyp lesion snare tq (N/A, 2017); Colonoscopy (2017); Cholecystectomy, laparoscopic (N/A, 10/29/2022); Esophagogastroduodenoscopy (2023); Upper gastrointestinal endoscopy (N/A, 2023); Upper gastrointestinal endoscopy (N/A, 2023); Cardiac procedure (N/A, 2024); Cardiac procedure (N/A, 8/3/2024); and ep device procedure (N/A, 2024).    Discharge Recommendations  Discharge Recommendations: Patient would benefit from continued therapy after discharge  PT Equipment Recommendations  Equipment Needed: No  Further therapy recommended at discharge.The patient should be able to tolerate at least 3 hours of therapy per day over 5 days or 15 hours over 7 days.   This patient may benefit from a Physical Medicine and Rehab consult.     Assessment  Body Structures, Functions, Activity Limitations Requiring Skilled Therapeutic Intervention: Decreased functional mobility ;Decreased
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
## 2025-03-20 NOTE — PLAN OF CARE
Problem: Chronic Conditions and Co-morbidities  Goal: Patient's chronic conditions and co-morbidity symptoms are monitored and maintained or improved  3/20/2025 1018 by Killian Casper RN  Outcome: Progressing  3/20/2025 0424 by Jenn Calvo RN  Outcome: Progressing     Problem: Discharge Planning  Goal: Discharge to home or other facility with appropriate resources  3/20/2025 1018 by Killian Casper RN  Outcome: Progressing  3/20/2025 0424 by Jenn Calvo RN  Outcome: Progressing     Problem: Safety - Adult  Goal: Free from fall injury  3/20/2025 1018 by Killian Casper RN  Outcome: Progressing  3/20/2025 0424 by Jenn Calvo RN  Outcome: Progressing     Problem: ABCDS Injury Assessment  Goal: Absence of physical injury  3/20/2025 1018 by Killian Casper RN  Outcome: Progressing  3/20/2025 0424 by Jenn Calvo RN  Outcome: Progressing     Problem: Skin/Tissue Integrity  Goal: Skin integrity remains intact  Description: 1.  Monitor for areas of redness and/or skin breakdown  2.  Assess vascular access sites hourly  3.  Every 4-6 hours minimum:  Change oxygen saturation probe site  4.  Every 4-6 hours:  If on nasal continuous positive airway pressure, respiratory therapy assess nares and determine need for appliance change or resting period  3/20/2025 1018 by Killian Casper RN  Outcome: Progressing  3/20/2025 0424 by Jenn Calvo RN  Outcome: Progressing

## 2025-03-20 NOTE — PROGRESS NOTES
[facility] Medicine Inpatient Service  [facility]  Progress Note    Date:   3/20/2025  Patient name:  Mina Gill  Date of admission:  3/18/2025  8:54 PM  MRN:   [id number]  YOB: 1940    SUBJECTIVE/Last 24 hours update:     Patient seen at bedside.  States that he is feeling generally well this morning.    CRP trending up at 318, WBC trending down at 13.1 this morning.  Patient continues on vancomycin and Zosyn.    Blood cultures growing Morganella morganii.  T palladium IgG positive.  VDRL ordered and pending, infectious disease consulted and following with patient for further evaluation.    Mild edema noted around penile shaft and scrotum, purulent discharge noted at urethra unchanged from yesterday, ultrasound scrotum ordered and pending.    Reporting mild epigastric discomfort.  Troponin at 37, baseline 25.  Cardiology consulted, recommendations pending.    Home blood pressure medications continue to be held (Norvasc, Lasix, Imdur).  BP this morning 148/60.  Will continue to monitor.    Experienced episode of choking, difficulty swallowing per nursing overnight.  Patient seen by speech therapy on 3/17, during evaluation no aspiration and recommended soft bite-size diet with thin liquids.  Chest x-ray in process.  Will continue to monitor.    HPI:     The patient is a 84 y.o.  male with PMH of Parkinson, CKD stage IIIa (BL Cr 1.4), CAD, NSTEMI, recent RLE foot cellulitis, dysphagia, GERD, SSS/heart block (on pacemaker)  who presented with complaints of weakness, fatigue and hemodynamic instability.     Patient presented by EMS to ED with complaints of weakness, fatigue, nausea, and vomiting for past couple of days.  The patient was found to be hypotensive on arrival.  Denies fever, chills, abdominal pain, dysuria, frequency, urgency, pain during urination.  No chest pain, palpitations , shortness of breath, diarrhea, constipation or any other associated symptoms     On  examination the patient was at baseline mental status with slurred speech, which is also baseline.  Notable findings included tremors due to underlying Parkinson disease.  Vitals were significant for hypotension requiring fluid resuscitation, mild tachypnea and temperature of 99     Laboratory workup positive for leukocytosis (WBC 16.8), elevated lactate 2.9, repeat 3.1, elevated creatinine 2.1 (1.4).  UA positive for large leukocyte esterase.  Blood culture and urine culture pending.  Chest x-ray CT head and CTAP negative for acute pathology.  Hypokalemia potassium 3.4     The patient received 2 L fluid bolus followed by maintenance fluids resulting in improvement in blood pressure.  Started on empiric coverage with Zosyn and vancomycin.      Patient is being admitted for the management of sepsis, likely secondary to UTI, and acute on chronic kidney injury    REVIEW OF SYSTEMS:   Review of Systems   Constitutional: Negative.    Respiratory: Negative.     Gastrointestinal:         Reporting epigastric discomfort   Neurological:  Positive for tremors (Secondary to Parkinson's disease).   Psychiatric/Behavioral: Negative.         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    PAST SURGICAL HISTORY:      has a past surgical history that includes Colonoscopy (11/02/2012); shoulder surgery (Right); pr colsc flx w/rmvl of tumor polyp lesion snare tq (N/A, 09/19/2017); Colonoscopy  MD Marzena        glucagon injection 1 mg  1 mg SubCUTAneous PRN Marzena Casanova MD        dextrose 10 % infusion   IntraVENous Continuous PRN Marzena Casanova MD        piperacillin-tazobactam (ZOSYN) 3,375 mg in sodium chloride 0.9 % 50 mL IVPB (addEASE)  3,375 mg IntraVENous Q8H Collette Jimenez APRN - CNP   Stopped at 03/20/25 0605    vancomycin (VANCOCIN) intermittent dosing (placeholder)   Other RX Placeholder Collette Jimenez APRN - CNP        vancomycin (VANCOCIN) 1,000 mg in sodium chloride 0.9 % 250 mL IVPB ([id number])  1,000 mg IntraVENous Q24H Collette Jimenez APRN - CNP   Stopped at 03/20/25 0012    insulin glargine (LANTUS) injection vial 5 Units  5 Units SubCUTAneous Nightly Marzena Casanova MD   5 Units at 03/19/25 2019    benzocaine-menthol (CEPACOL SORE THROAT) lozenge 1 lozenge  1 lozenge Oral Q2H PRN Matheus Gómez MD   1 lozenge at 03/19/25 2026       ASSESSMENT:     Principal Problem:    Sepsis (HCC)  Active Problems:    Gram-neg septicemia (HCC)    Acute sepsis (HCC)    Chronic ulcer of toes, right, with fat layer exposed (HCC)    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 *      PLAN:     Weakness likely 2/2 Sepsis due to UTI vs RLE Wound vs Unknown Etiology  New onset weakness, fatigue, nausea and vomiting on arrival  Hypotension, tachypnea, elevated leukocyte meeting criteria for SIRS  Given 2 L fluid bolus, on 75 mL maintenance hydration, blood pressure improving  CBC positive leukocytosis 16.2  Lactate elevated 2.9>3.1  Repeat lactate pending  CRP pending  CT head negative for acute pathology  CXR negative  CTAP negative for acute pathology     UTI  Urinalysis positive for turbid urine, large leukocyte esterase  Urine culture pending  Blood culture pending  Patient on Mcdowell catheter, placed in ER  Patient continues on Zosyn and vancomycin  Continue to monitor     Right lower extremity wound  Recent history of right lower

## 2025-03-20 NOTE — PROGRESS NOTES
SPEECH LANGUAGE PATHOLOGY  Facility/Department: [facility] CAR 2- STEPDOWN   CLINICAL BEDSIDE SWALLOW EVALUATION    NAME: Mina Gill  : 1940  MRN: [id number]    ADMISSION DATE: 3/18/2025  ADMITTING DIAGNOSIS: has Temporary loss of eyesight; Syncope : posterior circulation stroke vs Neurocardiogenic syncope ; Intracranial bleed : traumatic left sub-dural hematoma ,managed conservatively in ; Headache; CKD (chronic kidney disease) stage 3, GFR 30-59 ml/min ([facility]); Depression; Hyperlipidemia; Microhematuria; Microalbuminuria; Essential hypertension; Generalized abdominal pain; Tubular adenoma of colon; Diverticulosis of colon; History of skull fracture; Nausea; Pure hypercholesterolemia; Prediabetes; Parkinson disease ([facility]); Chronic post-traumatic headache, not intractable; Fall (on) (from) other stairs and steps, initial encounter; Strain of iliopsoas muscle, initial encounter; Chronic pain of left knee; Closed fracture of second toe of right foot; Closed fracture of second toe of left foot; Abnormal ECG; Cerebrovascular accident ([facility]); Chest pain, unspecified; Hematoma of scalp; Left ventricular hypertrophy; Mild aortic valve regurgitation; Pulmonary hypertension, mild ([facility]); Lumbar radiculopathy; Dizziness; Hyponatremia; Vomiting; General weakness; Overweight (BMI 25.0-29.9); Frequent falls; Unspecified inflammatory spondylopathy, lumbar region; Stage 3b chronic kidney disease (CKD) ([facility]); Chronic pain of multiple joints; Multiple comorbid conditions; Gout; Nerve pain; NSTEMI (non-ST elevated myocardial infarction) ([facility]); History of subdural hematoma; Coronary artery disease involving native heart with angina pectoris; Lumbar facet arthropathy; Spinal stenosis of lumbar region without neurogenic claudication; Generalized weakness; Lumbosacral spondylosis without myelopathy; Gallstone pancreatitis; Calculus of gallbladder with acute cholecystitis without obstruction; Bandemia; Pulmonary nodule; MRSA carrier;  demonstrate understanding of compensatory strategies for improved swallowing safety.;The patient will improve oral motor function via therapeutic oral motor exercises to 5/5 each trial.    General  Chart Reviewed: Yes  Comments: Pt referred for skilled ST bedside swallow evaluation due to concern for aspiration. RN reports Pt coughing on thin liquids.  Behavior/Cognition: Alert;Cooperative  Respiratory Status: Room air  O2 Device: None (Room air)  Communication Observation: Functional  Follows Directions: Simple  Dentition: Dentures top;Edentulous (missing bottom dentures)  Patient Positioning: Upright in chair  Baseline Vocal Quality: Weak  Volitional Cough: Weak  Prior Dysphagia History: Pt evaluated for swallowing as outpatient earlier this week. Last MBS 11/2024 recommending soft and bite sized with thin liquids.  Consistencies Administered: Soft and Bite-Sized;Pureed;Thin - cup    Vision/Hearing  Vision     Vision Impaired   Vision Exceptions Wears glasses at all times   Hearing     Hearing Within functional limits       Oral Motor Deficits  Labial: Decreased seal  Dentition: Upper dentures;Edentulous (missing lower dentures)  Oral Hygiene: Moist  Lingual: Decreased rate (decreased ROM)  Mandible: No impairment  Consistencies Administered: Soft and Bite-Sized;Pureed;Thin - cup    Oral/Pharyngeal Phase Dysfunction  Oral Phase  Oral Phase: Exceptions    Pharyngeal Phase  Pharyngeal Phase: Exceptions    PO Trials     Consistency Presented Soft & Bite Sized; Pureed; Thin   How Presented Self-fed/presented; SLP-fed/Presented   Bolus Acceptance Impaired   Bolus Formation/Control Impaired   Type of Impairment Anterior; Spillage; Delayed; Incomplete; Mastication   Propulsion Delayed (# of seconds); Discoordination   Oral Residue 10-50% of bolus; Lingual   Initiation of Swallow Delayed (# of seconds)   Laryngeal Elevation Decreased   Aspiration Signs/Symptoms Throat clear; Delayed cough/throat clear; Weak cough

## 2025-03-21 PROBLEM — A41.50 GRAM NEGATIVE SEPTICEMIA (HCC): Status: ACTIVE | Noted: 2025-03-21

## 2025-03-21 PROBLEM — N39.0 COMPLICATED UTI (URINARY TRACT INFECTION): Status: ACTIVE | Noted: 2025-03-21

## 2025-03-21 PROBLEM — A49.8 BACTERIAL INFECTION DUE TO MORGANELLA MORGANII: Status: ACTIVE | Noted: 2025-03-21

## 2025-03-21 LAB
ANION GAP SERPL CALCULATED.3IONS-SCNC: 14 MMOL/L (ref 9–16)
BASOPHILS # BLD: <0.03 K/UL (ref 0–0.2)
BASOPHILS NFR BLD: 0 % (ref 0–2)
BUN SERPL-MCNC: 21 MG/DL (ref 8–23)
CALCIUM SERPL-MCNC: 8.5 MG/DL (ref 8.6–10.4)
CHLORIDE SERPL-SCNC: 101 MMOL/L (ref 98–107)
CO2 SERPL-SCNC: 17 MMOL/L (ref 20–31)
CREAT SERPL-MCNC: 1.5 MG/DL (ref 0.7–1.2)
CRP SERPL HS-MCNC: 198 MG/L (ref 0–5)
EOSINOPHIL # BLD: 0.08 K/UL (ref 0–0.44)
EOSINOPHILS RELATIVE PERCENT: 1 % (ref 1–4)
ERYTHROCYTE [DISTWIDTH] IN BLOOD BY AUTOMATED COUNT: 13.7 % (ref 11.8–14.4)
GFR, ESTIMATED: 45 ML/MIN/1.73M2
GLUCOSE BLD-MCNC: 171 MG/DL (ref 75–110)
GLUCOSE BLD-MCNC: 177 MG/DL (ref 75–110)
GLUCOSE BLD-MCNC: 182 MG/DL (ref 75–110)
GLUCOSE BLD-MCNC: 185 MG/DL (ref 75–110)
GLUCOSE BLD-MCNC: 209 MG/DL (ref 75–110)
GLUCOSE SERPL-MCNC: 183 MG/DL (ref 74–99)
HCT VFR BLD AUTO: 35 % (ref 40.7–50.3)
HGB BLD-MCNC: 12 G/DL (ref 13–17)
IMM GRANULOCYTES # BLD AUTO: 0.07 K/UL (ref 0–0.3)
IMM GRANULOCYTES NFR BLD: 1 %
LYMPHOCYTES NFR BLD: 0.82 K/UL (ref 1.1–3.7)
LYMPHOCYTES RELATIVE PERCENT: 9 % (ref 24–43)
M PNEUMO IGM SER QL IA: 0.52
MCH RBC QN AUTO: 32.4 PG (ref 25.2–33.5)
MCHC RBC AUTO-ENTMCNC: 34.3 G/DL (ref 28.4–34.8)
MCV RBC AUTO: 94.6 FL (ref 82.6–102.9)
MICROORGANISM SPEC CULT: ABNORMAL
MONOCYTES NFR BLD: 0.53 K/UL (ref 0.1–1.2)
MONOCYTES NFR BLD: 6 % (ref 3–12)
NEUTROPHILS NFR BLD: 83 % (ref 36–65)
NEUTS SEG NFR BLD: 7.65 K/UL (ref 1.5–8.1)
NRBC BLD-RTO: 0 PER 100 WBC
PLATELET # BLD AUTO: ABNORMAL K/UL (ref 138–453)
PLATELET, FLUORESCENCE: ABNORMAL K/UL (ref 138–453)
POTASSIUM SERPL-SCNC: 3.6 MMOL/L (ref 3.7–5.3)
RBC # BLD AUTO: 3.7 M/UL (ref 4.21–5.77)
SERVICE CMNT-IMP: ABNORMAL
SERVICE CMNT-IMP: ABNORMAL
SODIUM SERPL-SCNC: 132 MMOL/L (ref 136–145)
SPECIMEN DESCRIPTION: ABNORMAL
SPECIMEN DESCRIPTION: ABNORMAL
T PALLIDUM AB SER QL HA: NONREACTIVE
VDRL SER-TITR: NONREACTIVE {TITER}
WBC OTHER # BLD: 9.2 K/UL (ref 3.5–11.3)

## 2025-03-21 PROCEDURE — 2580000003 HC RX 258

## 2025-03-21 PROCEDURE — 92526 ORAL FUNCTION THERAPY: CPT

## 2025-03-21 PROCEDURE — 85025 COMPLETE CBC W/AUTO DIFF WBC: CPT

## 2025-03-21 PROCEDURE — 97530 THERAPEUTIC ACTIVITIES: CPT

## 2025-03-21 PROCEDURE — 6370000000 HC RX 637 (ALT 250 FOR IP)

## 2025-03-21 PROCEDURE — 6360000002 HC RX W HCPCS

## 2025-03-21 PROCEDURE — 36415 COLL VENOUS BLD VENIPUNCTURE: CPT

## 2025-03-21 PROCEDURE — 99232 SBSQ HOSP IP/OBS MODERATE 35: CPT | Performed by: INTERNAL MEDICINE

## 2025-03-21 PROCEDURE — 97110 THERAPEUTIC EXERCISES: CPT

## 2025-03-21 PROCEDURE — 85055 RETICULATED PLATELET ASSAY: CPT

## 2025-03-21 PROCEDURE — 80048 BASIC METABOLIC PNL TOTAL CA: CPT

## 2025-03-21 PROCEDURE — 2500000003 HC RX 250 WO HCPCS: Performed by: INTERNAL MEDICINE

## 2025-03-21 PROCEDURE — 2580000003 HC RX 258: Performed by: NURSE PRACTITIONER

## 2025-03-21 PROCEDURE — 6360000002 HC RX W HCPCS: Performed by: INTERNAL MEDICINE

## 2025-03-21 PROCEDURE — 86140 C-REACTIVE PROTEIN: CPT

## 2025-03-21 PROCEDURE — 82947 ASSAY GLUCOSE BLOOD QUANT: CPT

## 2025-03-21 PROCEDURE — 1200000000 HC SEMI PRIVATE

## 2025-03-21 PROCEDURE — 99232 SBSQ HOSP IP/OBS MODERATE 35: CPT | Performed by: STUDENT IN AN ORGANIZED HEALTH CARE EDUCATION/TRAINING PROGRAM

## 2025-03-21 PROCEDURE — 2500000003 HC RX 250 WO HCPCS

## 2025-03-21 PROCEDURE — 6360000002 HC RX W HCPCS: Performed by: NURSE PRACTITIONER

## 2025-03-21 RX ORDER — DIPHENHYDRAMINE HYDROCHLORIDE, ZINC ACETATE 2; .1 G/100G; G/100G
CREAM TOPICAL 3 TIMES DAILY PRN
Status: DISCONTINUED | OUTPATIENT
Start: 2025-03-21 | End: 2025-03-26 | Stop reason: HOSPADM

## 2025-03-21 RX ORDER — AMITRIPTYLINE HYDROCHLORIDE 10 MG/1
10 TABLET ORAL NIGHTLY
Status: DISCONTINUED | OUTPATIENT
Start: 2025-03-21 | End: 2025-03-26 | Stop reason: HOSPADM

## 2025-03-21 RX ORDER — FUROSEMIDE 10 MG/ML
20 INJECTION INTRAMUSCULAR; INTRAVENOUS 2 TIMES DAILY
Status: DISCONTINUED | OUTPATIENT
Start: 2025-03-21 | End: 2025-03-24

## 2025-03-21 RX ADMIN — GABAPENTIN 100 MG: 100 CAPSULE ORAL at 21:39

## 2025-03-21 RX ADMIN — SUCRALFATE 1 G: 1 TABLET ORAL at 23:47

## 2025-03-21 RX ADMIN — Medication 5 MG: at 21:49

## 2025-03-21 RX ADMIN — ENTACAPONE 200 MG: 200 TABLET, FILM COATED ORAL at 21:39

## 2025-03-21 RX ADMIN — ACETAMINOPHEN 650 MG: 325 TABLET ORAL at 07:32

## 2025-03-21 RX ADMIN — SUCRALFATE 1 G: 1 TABLET ORAL at 05:10

## 2025-03-21 RX ADMIN — ROPINIROLE HYDROCHLORIDE 0.5 MG: 0.25 TABLET, FILM COATED ORAL at 14:14

## 2025-03-21 RX ADMIN — SUCRALFATE 1 G: 1 TABLET ORAL at 12:04

## 2025-03-21 RX ADMIN — CARBIDOPA AND LEVODOPA 2 TABLET: 25; 100 TABLET ORAL at 14:14

## 2025-03-21 RX ADMIN — POLYMYXIN B SULFATE, BACITRACIN ZINC, NEOMYCIN SULFATE: 5000; 3.5; 4 OINTMENT TOPICAL at 21:40

## 2025-03-21 RX ADMIN — CLOPIDOGREL BISULFATE 75 MG: 75 TABLET, FILM COATED ORAL at 07:37

## 2025-03-21 RX ADMIN — ROPINIROLE HYDROCHLORIDE 0.5 MG: 0.25 TABLET, FILM COATED ORAL at 21:39

## 2025-03-21 RX ADMIN — ACETAMINOPHEN 650 MG: 325 TABLET ORAL at 23:57

## 2025-03-21 RX ADMIN — PANTOPRAZOLE SODIUM 40 MG: 40 TABLET, DELAYED RELEASE ORAL at 07:33

## 2025-03-21 RX ADMIN — CARBIDOPA AND LEVODOPA 2 TABLET: 25; 100 TABLET ORAL at 21:39

## 2025-03-21 RX ADMIN — SUCRALFATE 1 G: 1 TABLET ORAL at 17:32

## 2025-03-21 RX ADMIN — POLYETHYLENE GLYCOL 3350 17 G: 17 POWDER, FOR SOLUTION ORAL at 13:33

## 2025-03-21 RX ADMIN — INSULIN LISPRO 2 UNITS: 100 INJECTION, SOLUTION INTRAVENOUS; SUBCUTANEOUS at 12:06

## 2025-03-21 RX ADMIN — WATER 2000 MG: 1 INJECTION INTRAMUSCULAR; INTRAVENOUS; SUBCUTANEOUS at 17:32

## 2025-03-21 RX ADMIN — SUCRALFATE 1 G: 1 TABLET ORAL at 00:31

## 2025-03-21 RX ADMIN — ENTACAPONE 200 MG: 200 TABLET, FILM COATED ORAL at 09:11

## 2025-03-21 RX ADMIN — PIPERACILLIN AND TAZOBACTAM 3375 MG: 3; .375 INJECTION, POWDER, LYOPHILIZED, FOR SOLUTION INTRAVENOUS at 10:08

## 2025-03-21 RX ADMIN — ENOXAPARIN SODIUM 30 MG: 100 INJECTION SUBCUTANEOUS at 07:37

## 2025-03-21 RX ADMIN — ROPINIROLE HYDROCHLORIDE 0.5 MG: 0.25 TABLET, FILM COATED ORAL at 07:37

## 2025-03-21 RX ADMIN — SODIUM CHLORIDE: 0.9 INJECTION, SOLUTION INTRAVENOUS at 02:15

## 2025-03-21 RX ADMIN — FUROSEMIDE 20 MG: 10 INJECTION, SOLUTION INTRAMUSCULAR; INTRAVENOUS at 07:38

## 2025-03-21 RX ADMIN — ATORVASTATIN CALCIUM 40 MG: 40 TABLET, FILM COATED ORAL at 07:37

## 2025-03-21 RX ADMIN — INSULIN GLARGINE 5 UNITS: 100 INJECTION, SOLUTION SUBCUTANEOUS at 21:39

## 2025-03-21 RX ADMIN — POTASSIUM BICARBONATE 40 MEQ: 782 TABLET, EFFERVESCENT ORAL at 10:08

## 2025-03-21 RX ADMIN — POLYMYXIN B SULFATE, BACITRACIN ZINC, NEOMYCIN SULFATE: 5000; 3.5; 4 OINTMENT TOPICAL at 09:12

## 2025-03-21 RX ADMIN — DIPHENHYDRAMINE HYDROCHLORIDE, ZINC ACETATE: 2; .1 CREAM TOPICAL at 14:27

## 2025-03-21 RX ADMIN — PIPERACILLIN AND TAZOBACTAM 3375 MG: 3; .375 INJECTION, POWDER, LYOPHILIZED, FOR SOLUTION INTRAVENOUS at 03:01

## 2025-03-21 RX ADMIN — ENTACAPONE 200 MG: 200 TABLET, FILM COATED ORAL at 14:15

## 2025-03-21 RX ADMIN — SODIUM CHLORIDE, PRESERVATIVE FREE 10 ML: 5 INJECTION INTRAVENOUS at 09:58

## 2025-03-21 RX ADMIN — INSULIN LISPRO 2 UNITS: 100 INJECTION, SOLUTION INTRAVENOUS; SUBCUTANEOUS at 17:36

## 2025-03-21 RX ADMIN — AMITRIPTYLINE HYDROCHLORIDE 10 MG: 10 TABLET, FILM COATED ORAL at 21:39

## 2025-03-21 RX ADMIN — TAMSULOSIN HYDROCHLORIDE 0.4 MG: 0.4 CAPSULE ORAL at 09:54

## 2025-03-21 RX ADMIN — CARBIDOPA AND LEVODOPA 2 TABLET: 25; 100 TABLET ORAL at 09:10

## 2025-03-21 RX ADMIN — SODIUM CHLORIDE, PRESERVATIVE FREE 10 ML: 5 INJECTION INTRAVENOUS at 21:40

## 2025-03-21 RX ADMIN — FUROSEMIDE 20 MG: 10 INJECTION, SOLUTION INTRAMUSCULAR; INTRAVENOUS at 17:33

## 2025-03-21 ASSESSMENT — PAIN SCALES - GENERAL
PAINLEVEL_OUTOF10: 5
PAINLEVEL_OUTOF10: 0

## 2025-03-21 ASSESSMENT — PAIN DESCRIPTION - DESCRIPTORS: DESCRIPTORS: ACHING

## 2025-03-21 ASSESSMENT — PAIN DESCRIPTION - LOCATION: LOCATION: HEAD

## 2025-03-21 ASSESSMENT — PAIN DESCRIPTION - ORIENTATION: ORIENTATION: ANTERIOR

## 2025-03-21 NOTE — CARE COORDINATION
Case Management   Daily Progress Note       Patient Name: Mina Gill                   YOB: 1940  Diagnosis: TAMARA (acute kidney injury) [N17.9]  Sepsis (HCC) [A41.9]  Acute sepsis (HCC) [A41.9]                       GMLOS: 3.5 days  Length of Stay: 2  days    Anticipated Discharge Date: Two or more days before discharge    Readmission Risk (Low < 19, Mod (19-27), High > 27): Readmission Risk Score: 17.8      Patient Transitional Goal: Skilled nursing facility    Current Transitional Plan    [] Home Independently    [] Home with HC    [x] Skilled Nursing Facility    [] Acute Rehabilitation    [] Long Term Acute Care (LTAC)    [] Other:     Plan for the Stay (Medical Management) : IV atb, IV diuresis      Workflow Continuation (Additional Notes) : met with patient. He prefers to go to SNF at discharge. He would like his son, Tato, to review SNF list. Spoke to Tato via telephone. He is on his way to the hospital. List left at bedside        Adelaida Stone RN  March 21, 2025

## 2025-03-21 NOTE — PROGRESS NOTES
Physical Therapy  Facility/Department: [facility] CAR 2- STEPDOWN   Physical Therapy Daily Treatment Note    Patient Name: Mina Gill        MRN: [id number]    : 1940    Date of Service: 3/21/2025    Chief Complaint   Patient presents with    Nausea    Vomiting     Zofran per EMS x10 mins PTA     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 (HCC), CKD (chronic kidney disease) stage 2, GFR 60-89 ml/min, CKD (chronic kidney disease) stage 3, GFR 30-59 ml/min (HCC), Diverticulosis of colon, GERD (gastroesophageal reflux disease), History of non-ST elevation myocardial infarction (NSTEMI) 2022, Impaired renal function, MRSA (methicillin resistant staph aureus) culture positive, Osteoarthritis of knee, Parkinson disease (HCC), Proteinuria, Skull fracture (HCC), Temporary loss of eyesight, and Tubular adenoma of colon.  Past Surgical History:  has a past surgical history that includes Colonoscopy (2012); shoulder surgery (Right); pr colsc flx w/rmvl of tumor polyp lesion snare tq (N/A, 2017); Colonoscopy (2017); Cholecystectomy, laparoscopic (N/A, 10/29/2022); Esophagogastroduodenoscopy (2023); Upper gastrointestinal endoscopy (N/A, 2023); Upper gastrointestinal endoscopy (N/A, 2023); Cardiac procedure (N/A, 2024); Cardiac procedure (N/A, 8/3/2024); and ep device procedure (N/A, 2024).    Discharge Recommendations  Discharge Recommendations: Patient would benefit from continued therapy after discharge  PT Equipment Recommendations  Equipment Needed: No    Assessment  Body Structures, Functions, Activity Limitations Requiring Skilled Therapeutic Intervention: Decreased functional mobility ;Decreased ADL status;Decreased strength;Decreased high-level IADLs;Decreased balance;Decreased endurance;Decreased safe awareness;Decreased coordination;Decreased posture;Decreased body mechanics  Assessment: Pt presenting with mobility

## 2025-03-21 NOTE — PROGRESS NOTES
[facility] Medicine Inpatient Service  [facility]  Progress Note    Date:   3/21/2025  Patient name:  Mina Gill  Date of admission:  3/18/2025  8:54 PM  MRN:   [id number]  YOB: 1940    SUBJECTIVE/Last 24 hours update:     Patient seen and examined at bedside, states that he is feeling well.    CRP trending down at 198 today from 318 yesterday.  WBCs trending down at 9.2 today from 13.1 yesterday.  Creatinine down from 1.7 to 1.5.    Scrotal ultrasound ordered yesterday due to edema, showing small bilateral hydroceles with no acute pathology.  T. palladium reactive, VDRL currently pending.  Urine and blood culture growing Morganella morganii, infectious disease following with patient.  Currently on Zosyn.    Blood pressure 117/80 this morning.  Home dose Norvasc, Lasix, Imdur continues to be held.  Will continue to monitor.    HPI:     The patient is a 84 y.o.  male with PMH of Parkinson, CKD stage IIIa (BL Cr 1.4), CAD, NSTEMI, recent RLE foot cellulitis, dysphagia, GERD, SSS/heart block (on pacemaker)  who presented with complaints of weakness, fatigue and hemodynamic instability.     Patient presented by EMS to ED with complaints of weakness, fatigue, nausea, and vomiting for past couple of days.  The patient was found to be hypotensive on arrival.  Denies fever, chills, abdominal pain, dysuria, frequency, urgency, pain during urination.  No chest pain, palpitations , shortness of breath, diarrhea, constipation or any other associated symptoms     On examination the patient was at baseline mental status with slurred speech, which is also baseline.  Notable findings included tremors due to underlying Parkinson disease.  Vitals were significant for hypotension requiring fluid resuscitation, mild tachypnea and temperature of 99     Laboratory workup positive for leukocytosis (WBC 16.8), elevated lactate 2.9, repeat 3.1, elevated creatinine 2.1 (1.4).  UA positive for large      1. Severe osteoarthritis at the right 1st metatarsophalangeal joint. 2. Comminuted fracture of the distal end of the proximal phalanx of the right 2nd toe with intra-articular extension of fracture. This fracture may be healing fracture. 3. Soft tissue swelling in the distal and midportion of the dorsum and sole of the right foot, indicating edema and probable cellulitis without any abnormal gas in the soft tissues.     US SCROTUM W LIMITED DUPLEX  Result Date: 3/20/2025  EXAMINATION: ULTRASOUND OF THE SCROTUM/TESTICLES WITH COLOR DOPPLER FLOW EVALUATION 3/20/2025 TECHNIQUE: Duplex ultrasound using B-mode/gray scaled imaging, Doppler spectral analysis and color flow Doppler was obtained of the testicles. COMPARISON: None. HISTORY: ORDERING SYSTEM PROVIDED HISTORY: urethral discharge and testicular swelling TECHNOLOGIST PROVIDED HISTORY: urethral discharge and testicular swelling FINDINGS: Measurements: Right Testicle: 4.6 x 2.3 x 2.9 cm Left Testicle: 4.3 x 2.7 x 3.2 cm Right: Grey Scale: The right testicle demonstrates normal homogeneous echotexture without focal lesion.  No evidence of testicular microlithiasis. Doppler Evaluation: There is normal arterial and venous Doppler flow within the testicle. Scrotal Sac: There is a small right hydrocele. Epididymis: No acute abnormality. Left: Grey Scale: The left testicle demonstrates normal homogeneous echotexture without focal lesion.  No evidence of testicular microlithiasis. Doppler Evaluation: There is normal arterial and venous Doppler flow within the testicle. Scrotal Sac: There is a small left hydrocele. Epididymis: No acute abnormality.     1. Small bilateral hydroceles.     FL MODIFIED BARIUM SWALLOW W VIDEO  Result Date: 3/20/2025  EXAMINATION: MODIFIED BARIUM SWALLOW WAS PERFORMED IN CONJUNCTION WITH [facility] TECHNIQUE: Under fluoroscopic evaluation cineradiography/videoradiography recordings were performed in conjunction with the

## 2025-03-21 NOTE — ACP (ADVANCE CARE PLANNING)
Advance Care Planning     Advance Care Planning Inpatient Note  [facility] Department    Today's Date: 3/21/2025  Unit: [facility] CAR 2- STEPDOWN    Received request from HealthCare Provider.  Upon review of chart and communication with care team, patient's decision making abilities are not in question.. Patient was present in the room during visit.    Goals of ACP Conversation:  Discuss advance care planning documents    Health Care Decision Makers:      Primary Decision Maker: Delvin Gill - Child - [phone]    Secondary Decision Maker: Ivone Gill - Child - [phone]  Summary:  Verified Documents  Verified Healthcare Decision Maker    Advance Care Planning Documents (Patient Wishes):  Healthcare Power of /Advance Directive Appointment of Health Care Agent  Living Will/Advance Directive     Assessment:    Request for advance directives received from spiritual services team member. Pt in recliner, resting comfortably; currently in contact isolation due to MRSA.     Per EPIC, pt has scanned Healthcare POA and Living Will Declaration completed on 09/26/2024. Per HCPOA document, pt has designated his son, Delvin Gill (phone number: [phone]), as his primary healthcare agent, and his daughter, Ivone Gill (phone number: [phone]), as his first alternate healthcare agent. Reviewed documents with pt.     Per pt, he does not wish to make any changes to his advance directives. Pt expressed that he would like his son, Delvin, to still be his primary healthcare agent and his daughter, Ivone, to still be his first alternate healthcare agent.    This writer called Delvin. Delvin is agreeable to being primary healthcare agent.     Pt's RN notified of outcome.      Interventions:  Encouraged ongoing ACP conversation with future decision makers and loved ones    Care Preferences Communicated:   No    Outcomes/Plan:  Existing advance directive reviewed with patient; no changes to patient's previously

## 2025-03-21 NOTE — PROGRESS NOTES
Speech Language Pathology  [facility]    Dysphagia Treatment Note    Date: 3/21/2025  Patient’s Name: Mina Gill  MRN: [id number]  Diagnosis:   Patient Active Problem List   Diagnosis Code    Temporary loss of eyesight H53.129    Syncope : posterior circulation stroke vs Neurocardiogenic syncope  R55    Intracranial bleed : traumatic left sub-dural hematoma ,managed conservatively in 2011 I62.9    Headache R51.9    CKD (chronic kidney disease) stage 3, GFR 30-59 ml/min ([facility]) N18.30    Gastroesophageal reflux disease without esophagitis K21.9    Depression F32.A    Hyperlipidemia E78.5    Microhematuria R31.29    Microalbuminuria R80.9    Primary hypertension I10    Generalized abdominal pain R10.84    Tubular adenoma of colon D12.6    Diverticulosis of colon K57.30    History of skull fracture Z87.81    Nausea R11.0    Pure hypercholesterolemia E78.00    Prediabetes R73.03    Parkinson disease ([facility]) G20.A1    Chronic post-traumatic headache, not intractable G44.329    Fall (on) (from) other stairs and steps, initial encounter W10.8XXA    Strain of iliopsoas muscle, initial encounter S76.819A    Chronic pain of left knee M25.562, G89.29    Closed fracture of second toe of right foot S92.501A    Closed fracture of second toe of left foot S92.502A    Abnormal ECG R94.31    Cerebrovascular accident ([facility]) I63.9    Chest pain, unspecified R07.9    Hematoma of scalp S00.03XA    Left ventricular hypertrophy I51.7    Mild aortic valve regurgitation I35.1    Pulmonary hypertension, mild ([facility]) I27.20    Lumbar radiculopathy M54.16    Dizziness R42    Hyponatremia E87.1    Vomiting R11.10    General weakness R53.1    Overweight (BMI 25.0-29.9) E66.3    Frequent falls R29.6    Unspecified inflammatory spondylopathy, 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 Pt with R sided weakness. Education provided and exercises left at bedside.      Plan:  [x] Continue ST services    [] Discharge from ST:        Discharge recommendations: []  Further therapy recommended at discharge.The patient should be able to tolerate at least 3 hours of therapy per day over 5 days or 15 hours over 7 days. [x] Further therapy recommended at discharge.   [] No therapy recommended at discharge.       Treatment completed by: Jenn Bhatti M.A., CCC-SLP
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
## 2025-03-21 NOTE — PLAN OF CARE
Problem: Chronic Conditions and Co-morbidities  Goal: Patient's chronic conditions and co-morbidity symptoms are monitored and maintained or improved  Outcome: Progressing  Flowsheets (Taken 3/21/2025 0800)  Care Plan - Patient's Chronic Conditions and Co-Morbidity Symptoms are Monitored and Maintained or Improved: Monitor and assess patient's chronic conditions and comorbid symptoms for stability, deterioration, or improvement     Problem: Discharge Planning  Goal: Discharge to home or other facility with appropriate resources  Outcome: Progressing  Flowsheets (Taken 3/21/2025 0800)  Discharge to home or other facility with appropriate resources: Identify barriers to discharge with patient and caregiver     Problem: Safety - Adult  Goal: Free from fall injury  Outcome: Progressing     Problem: ABCDS Injury Assessment  Goal: Absence of physical injury  Outcome: Progressing     Problem: Skin/Tissue Integrity  Goal: Skin integrity remains intact  Description: 1.  Monitor for areas of redness and/or skin breakdown  2.  Assess vascular access sites hourly  3.  Every 4-6 hours minimum:  Change oxygen saturation probe site  4.  Every 4-6 hours:  If on nasal continuous positive airway pressure, respiratory therapy assess nares and determine need for appliance change or resting period  Outcome: Progressing  Flowsheets (Taken 3/21/2025 0800)  Skin Integrity Remains Intact: Monitor for areas of redness and/or skin breakdown

## 2025-03-21 NOTE — PROGRESS NOTES
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  Past Surgical History:  has a past surgical history that includes Colonoscopy (2012); shoulder surgery (Right); pr colsc flx w/rmvl of tumor polyp lesion snare tq (N/A, 2017); Colonoscopy (2017); Cholecystectomy, laparoscopic (N/A, 10/29/2022); Esophagogastroduodenoscopy (2023); Upper gastrointestinal endoscopy (N/A, 2023); Upper gastrointestinal endoscopy (N/A, 2023); Cardiac procedure (N/A, 2024); Cardiac procedure (N/A, 8/3/2024); and ep device procedure (N/A, 2024).    Discharge Recommendations  Discharge Recommendations: Patient would benefit from continued therapy after discharge       Assessment  Performance deficits / Impairments: Decreased functional mobility ;Decreased ADL status;Decreased endurance;Decreased high-level IADLs;Decreased ROM;Decreased strength;Decreased coordination;Decreased balance;Decreased fine motor control  Assessment: Pt limited by above deficits impacting ADL completion, ADL transfers and safe functional mobility to maximize pts potential and quality of life, decrease  washing of face and hands at [place] with setup while sitting to in chair to complete.    Balance  Balance  Sitting: With support;Without support (Pt initially demo's sitting EOB with Gustavo proceeding to close SBA in prep for sit to stand transition, tolerating ~5min.)  Standing: With support (Pt burton's standing with RW and Gustavo for transfers and functional mobility tolerating ~3min.)    Transfers/Mobility  Bed mobility  Supine to Sit: Partial/Moderate assistance  Sit to Supine: Unable to assess  Scooting: Partial/Moderate assistance to scoot hip forward toward EOB.   Bed Mobility Comments: HOB elevated ~ 40 degrees. Pt requires Gustavo to progress BLE to EOB with assist to clear feet over EOB Pt requires Gustavo to roll to left side and progress trunk to upright sitting at EOB with bedrail for support.         Transfers  Sit to stand: Moderate assistance  Stand to sit: Minimal assistance  Transfer Comments: Pt demo's sit<>stand from EOB with RW and modA with verbal cues for proper hand placement and to remain with walker. Pt requires modA to manuever and advance walker. Pt requires Gustavo  to transition stand to sit with verbal cues for reaching back for chair for safety and ease sitting .                 Exercises  OT Exercises  Exercise Treatment: BUE ther ex completed while sitting in recliner without resistance with verbal and visual cues for proper tech. Exercises include, shoulder flex/ext, shoulder rotation, shoulder abduction, elbow flex/ext., wrist supination/pronation, wrist flex/ext. x10 reps of each exercise for increased strengthening, endurance and ROM to faciliate ease of ADL completion, safe ADL transfers and safe functional mobility to maximize pts potential and quality of life.    Patient Education  Patient Education  Education Given To: Patient  Education Provided: Role of Therapy;Plan of Care;Home Exercise Program;Transfer Training;Mobility Training  Education Method: Verbal;Demonstration  Education

## 2025-03-21 NOTE — PLAN OF CARE
Plan of Care Note    Patient seen evaluated bedside.  ANO X3.  Vital stable.  Complaining of headaches, history of migraines, resuming Elavil & Ubrogepant  CRP and leukocyte trending down, leukocytes within normal limit  UTI, ID following, Zosyn switched to Rocephin for Morganella septicemia and complicated UTI. Stop date 3-27-25   Repeat blood cultures positive for gram-negative rods, per ID, no STAN needed  TAMARA resolved, acute creatinine back to baseline  Awaiting SNF placement, family agreeable for SNF.  Working with .      Matheus Gómez MD  Family Medicine Reident - PGY1  [facility], [place]  3/21/2025. 7:45 PM

## 2025-03-21 NOTE — PLAN OF CARE
Plan of Care Note    Patient seen evaluated bedside.  ANO X3.  Vital stable.  Modified barium swallow this morning, persistent oropharyngeal dysphagia, recommended diet puréed, and thin consistency  Leukocyte trending down, but CRP trending up, continuing patient on Zosyn, Vanco discontinued by ID  Cardiology consulted for concerns for endocarditis, will proceed with TTE.  Per cardiology, if persistent blood cultures positive, will consider STAN  Blood cultures growing Morganella Morgagni on 3/20  T palladium reactive, VDRL ordered and pending  Sinemet and entacapone changed from extended release to immediate release  CT chest positive for pulmonary edema, patient on IV Lasix 20, monitoring I/O's  Ultrasound scrotum showing small bilateral hydrocele, no other abnormality  Continue to hold home blood pressure pills including Norvasc, and Imdur      Matheus Gómez MD  Family Medicine Reident - PGY1  [facility], [place]  3/20/2025. 10:22 PM

## 2025-03-22 LAB
ANION GAP SERPL CALCULATED.3IONS-SCNC: 11 MMOL/L (ref 9–16)
BASOPHILS # BLD: <0.03 K/UL (ref 0–0.2)
BASOPHILS NFR BLD: 0 % (ref 0–2)
BUN SERPL-MCNC: 18 MG/DL (ref 8–23)
CALCIUM SERPL-MCNC: 8.8 MG/DL (ref 8.6–10.4)
CHLORIDE SERPL-SCNC: 102 MMOL/L (ref 98–107)
CO2 SERPL-SCNC: 20 MMOL/L (ref 20–31)
CREAT SERPL-MCNC: 1.4 MG/DL (ref 0.7–1.2)
CRP SERPL HS-MCNC: 141 MG/L (ref 0–5)
EOSINOPHIL # BLD: 0.17 K/UL (ref 0–0.44)
EOSINOPHILS RELATIVE PERCENT: 2 % (ref 1–4)
ERYTHROCYTE [DISTWIDTH] IN BLOOD BY AUTOMATED COUNT: 13.7 % (ref 11.8–14.4)
GFR, ESTIMATED: 49 ML/MIN/1.73M2
GLUCOSE BLD-MCNC: 119 MG/DL (ref 75–110)
GLUCOSE BLD-MCNC: 185 MG/DL (ref 75–110)
GLUCOSE BLD-MCNC: 194 MG/DL (ref 75–110)
GLUCOSE BLD-MCNC: 219 MG/DL (ref 75–110)
GLUCOSE SERPL-MCNC: 123 MG/DL (ref 74–99)
HCT VFR BLD AUTO: 37.1 % (ref 40.7–50.3)
HGB BLD-MCNC: 12.1 G/DL (ref 13–17)
IMM GRANULOCYTES # BLD AUTO: 0.08 K/UL (ref 0–0.3)
IMM GRANULOCYTES NFR BLD: 1 %
LYMPHOCYTES NFR BLD: 0.87 K/UL (ref 1.1–3.7)
LYMPHOCYTES RELATIVE PERCENT: 11 % (ref 24–43)
MCH RBC QN AUTO: 31.6 PG (ref 25.2–33.5)
MCHC RBC AUTO-ENTMCNC: 32.6 G/DL (ref 28.4–34.8)
MCV RBC AUTO: 96.9 FL (ref 82.6–102.9)
MICROORGANISM SPEC CULT: ABNORMAL
MICROORGANISM SPEC CULT: ABNORMAL
MONOCYTES NFR BLD: 0.84 K/UL (ref 0.1–1.2)
MONOCYTES NFR BLD: 10 % (ref 3–12)
NEUTROPHILS NFR BLD: 76 % (ref 36–65)
NEUTS SEG NFR BLD: 6.11 K/UL (ref 1.5–8.1)
NRBC BLD-RTO: 0.4 PER 100 WBC
PLATELET # BLD AUTO: 115 K/UL (ref 138–453)
PMV BLD AUTO: 9.7 FL (ref 8.1–13.5)
POTASSIUM SERPL-SCNC: 3.7 MMOL/L (ref 3.7–5.3)
RBC # BLD AUTO: 3.83 M/UL (ref 4.21–5.77)
SERVICE CMNT-IMP: ABNORMAL
SODIUM SERPL-SCNC: 133 MMOL/L (ref 136–145)
SPECIMEN DESCRIPTION: ABNORMAL
WBC OTHER # BLD: 8.1 K/UL (ref 3.5–11.3)

## 2025-03-22 PROCEDURE — 6370000000 HC RX 637 (ALT 250 FOR IP)

## 2025-03-22 PROCEDURE — 80048 BASIC METABOLIC PNL TOTAL CA: CPT

## 2025-03-22 PROCEDURE — 99232 SBSQ HOSP IP/OBS MODERATE 35: CPT | Performed by: STUDENT IN AN ORGANIZED HEALTH CARE EDUCATION/TRAINING PROGRAM

## 2025-03-22 PROCEDURE — 36415 COLL VENOUS BLD VENIPUNCTURE: CPT

## 2025-03-22 PROCEDURE — 6360000002 HC RX W HCPCS

## 2025-03-22 PROCEDURE — 86140 C-REACTIVE PROTEIN: CPT

## 2025-03-22 PROCEDURE — 1200000000 HC SEMI PRIVATE

## 2025-03-22 PROCEDURE — 6360000002 HC RX W HCPCS: Performed by: INTERNAL MEDICINE

## 2025-03-22 PROCEDURE — 82947 ASSAY GLUCOSE BLOOD QUANT: CPT

## 2025-03-22 PROCEDURE — 2500000003 HC RX 250 WO HCPCS

## 2025-03-22 PROCEDURE — 2500000003 HC RX 250 WO HCPCS: Performed by: INTERNAL MEDICINE

## 2025-03-22 PROCEDURE — 85025 COMPLETE CBC W/AUTO DIFF WBC: CPT

## 2025-03-22 RX ORDER — PANTOPRAZOLE SODIUM 40 MG/1
40 TABLET, DELAYED RELEASE ORAL
Status: DISCONTINUED | OUTPATIENT
Start: 2025-03-22 | End: 2025-03-26 | Stop reason: HOSPADM

## 2025-03-22 RX ORDER — POLYVINYL ALCOHOL 14 MG/ML
1 SOLUTION/ DROPS OPHTHALMIC PRN
Status: DISCONTINUED | OUTPATIENT
Start: 2025-03-22 | End: 2025-03-26 | Stop reason: HOSPADM

## 2025-03-22 RX ORDER — ACETAMINOPHEN 500 MG
1000 TABLET ORAL EVERY 6 HOURS PRN
Status: DISCONTINUED | OUTPATIENT
Start: 2025-03-22 | End: 2025-03-26 | Stop reason: HOSPADM

## 2025-03-22 RX ORDER — ACETAMINOPHEN 650 MG/1
650 SUPPOSITORY RECTAL EVERY 6 HOURS PRN
Status: DISCONTINUED | OUTPATIENT
Start: 2025-03-22 | End: 2025-03-26 | Stop reason: HOSPADM

## 2025-03-22 RX ADMIN — CARBIDOPA AND LEVODOPA 2 TABLET: 25; 100 TABLET ORAL at 13:37

## 2025-03-22 RX ADMIN — ROPINIROLE HYDROCHLORIDE 0.5 MG: 0.25 TABLET, FILM COATED ORAL at 13:37

## 2025-03-22 RX ADMIN — GABAPENTIN 100 MG: 100 CAPSULE ORAL at 20:39

## 2025-03-22 RX ADMIN — AMITRIPTYLINE HYDROCHLORIDE 10 MG: 10 TABLET, FILM COATED ORAL at 20:39

## 2025-03-22 RX ADMIN — INSULIN GLARGINE 5 UNITS: 100 INJECTION, SOLUTION SUBCUTANEOUS at 20:39

## 2025-03-22 RX ADMIN — INSULIN LISPRO 2 UNITS: 100 INJECTION, SOLUTION INTRAVENOUS; SUBCUTANEOUS at 16:27

## 2025-03-22 RX ADMIN — FUROSEMIDE 20 MG: 10 INJECTION, SOLUTION INTRAMUSCULAR; INTRAVENOUS at 16:26

## 2025-03-22 RX ADMIN — SODIUM CHLORIDE, PRESERVATIVE FREE 10 ML: 5 INJECTION INTRAVENOUS at 08:50

## 2025-03-22 RX ADMIN — SUCRALFATE 1 G: 1 TABLET ORAL at 16:25

## 2025-03-22 RX ADMIN — SODIUM CHLORIDE, PRESERVATIVE FREE 10 ML: 5 INJECTION INTRAVENOUS at 20:40

## 2025-03-22 RX ADMIN — CLOPIDOGREL BISULFATE 75 MG: 75 TABLET, FILM COATED ORAL at 08:50

## 2025-03-22 RX ADMIN — POLYMYXIN B SULFATE, BACITRACIN ZINC, NEOMYCIN SULFATE: 5000; 3.5; 4 OINTMENT TOPICAL at 22:33

## 2025-03-22 RX ADMIN — ENTACAPONE 200 MG: 200 TABLET, FILM COATED ORAL at 13:37

## 2025-03-22 RX ADMIN — POLYMYXIN B SULFATE, BACITRACIN ZINC, NEOMYCIN SULFATE: 5000; 3.5; 4 OINTMENT TOPICAL at 08:50

## 2025-03-22 RX ADMIN — ROPINIROLE HYDROCHLORIDE 0.5 MG: 0.25 TABLET, FILM COATED ORAL at 08:49

## 2025-03-22 RX ADMIN — ENTACAPONE 200 MG: 200 TABLET, FILM COATED ORAL at 08:53

## 2025-03-22 RX ADMIN — TAMSULOSIN HYDROCHLORIDE 0.4 MG: 0.4 CAPSULE ORAL at 08:49

## 2025-03-22 RX ADMIN — ATORVASTATIN CALCIUM 40 MG: 40 TABLET, FILM COATED ORAL at 08:49

## 2025-03-22 RX ADMIN — ENTACAPONE 200 MG: 200 TABLET, FILM COATED ORAL at 20:39

## 2025-03-22 RX ADMIN — CARBIDOPA AND LEVODOPA 2 TABLET: 25; 100 TABLET ORAL at 20:39

## 2025-03-22 RX ADMIN — FUROSEMIDE 20 MG: 10 INJECTION, SOLUTION INTRAMUSCULAR; INTRAVENOUS at 08:50

## 2025-03-22 RX ADMIN — CARBIDOPA AND LEVODOPA 2 TABLET: 25; 100 TABLET ORAL at 08:52

## 2025-03-22 RX ADMIN — SUCRALFATE 1 G: 1 TABLET ORAL at 12:48

## 2025-03-22 RX ADMIN — SUCRALFATE 1 G: 1 TABLET ORAL at 05:41

## 2025-03-22 RX ADMIN — PANTOPRAZOLE SODIUM 40 MG: 40 TABLET, DELAYED RELEASE ORAL at 16:27

## 2025-03-22 RX ADMIN — INSULIN LISPRO 2 UNITS: 100 INJECTION, SOLUTION INTRAVENOUS; SUBCUTANEOUS at 12:47

## 2025-03-22 RX ADMIN — ONDANSETRON 4 MG: 4 TABLET, ORALLY DISINTEGRATING ORAL at 20:45

## 2025-03-22 RX ADMIN — WATER 2000 MG: 1 INJECTION INTRAMUSCULAR; INTRAVENOUS; SUBCUTANEOUS at 17:25

## 2025-03-22 RX ADMIN — INSULIN LISPRO 2 UNITS: 100 INJECTION, SOLUTION INTRAVENOUS; SUBCUTANEOUS at 20:39

## 2025-03-22 RX ADMIN — ENOXAPARIN SODIUM 30 MG: 100 INJECTION SUBCUTANEOUS at 09:05

## 2025-03-22 RX ADMIN — ROPINIROLE HYDROCHLORIDE 0.5 MG: 0.25 TABLET, FILM COATED ORAL at 20:39

## 2025-03-22 ASSESSMENT — PAIN SCALES - GENERAL: PAINLEVEL_OUTOF10: 1

## 2025-03-22 NOTE — PLAN OF CARE
Problem: Chronic Conditions and Co-morbidities  Goal: Patient's chronic conditions and co-morbidity symptoms are monitored and maintained or improved  3/22/2025 1259 by Yaquelin Candelario RN  Outcome: Progressing  3/21/2025 2305 by Marv Marshall RN  Outcome: Progressing     Problem: Discharge Planning  Goal: Discharge to home or other facility with appropriate resources  3/22/2025 1259 by Yaquelin Candelario RN  Outcome: Progressing  Flowsheets (Taken 3/22/2025 0800)  Discharge to home or other facility with appropriate resources:   Identify barriers to discharge with patient and caregiver   Arrange for needed discharge resources and transportation as appropriate   Identify discharge learning needs (meds, wound care, etc)   Refer to discharge planning if patient needs post-hospital services based on physician order or complex needs related to functional status, cognitive ability or social support system  3/21/2025 2305 by Marv Marshall RN  Outcome: Progressing     Problem: Safety - Adult  Goal: Free from fall injury  3/22/2025 1259 by Yaquelin Candelario RN  Outcome: Progressing  3/21/2025 2305 by Marv Marshall RN  Outcome: Progressing     Problem: ABCDS Injury Assessment  Goal: Absence of physical injury  3/22/2025 1259 by Yaquelin Candelario RN  Outcome: Progressing  3/21/2025 2305 by Marv Marshall RN  Outcome: Progressing     Problem: Skin/Tissue Integrity  Goal: Skin integrity remains intact  Description: 1.  Monitor for areas of redness and/or skin breakdown  2.  Assess vascular access sites hourly  3.  Every 4-6 hours minimum:  Change oxygen saturation probe site  4.  Every 4-6 hours:  If on nasal continuous positive airway pressure, respiratory therapy assess nares and determine need for appliance change or resting period  3/22/2025 1259 by Yaquelin Candelario RN  Outcome: Progressing  3/21/2025 2305 by Marv Marshall RN  Outcome: Progressing

## 2025-03-22 NOTE — CARE COORDINATION
Case Management   Daily Progress Note       Patient Name: Mina Gill                   YOB: 1940  Diagnosis: TAMARA (acute kidney injury) [N17.9]  Sepsis (HCC) [A41.9]  Acute sepsis (HCC) [A41.9]                       GMLOS: 3.5 days  Length of Stay: 3  days    Anticipated Discharge Date: Two or more days before discharge    Readmission Risk (Low < 19, Mod (19-27), High > 27): Readmission Risk Score: 17      Patient Transitional Goal: Skilled nursing facility    Current Transitional Plan    [] Home Independently    [] Home with HC    [x] Skilled Nursing Facility    [] Acute Rehabilitation    [] Long Term Acute Care (LTAC)    [] Other:     Plan for the Stay (Medical Management) :    PT/OT /SLPordered.       Workflow Continuation (Additional Notes) :  Pt's son, Tato called and LM stating SNF list is in pt's room w/ 4SNF choices, order of choice:   1) [facility]  2) [facility]  3) [facility] (listed 2 times)  4) [facility]    Referrals faxed to 3 SNFs    ARGELIA STARK RN  March 22, 2025
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
## 2025-03-22 NOTE — PLAN OF CARE
Problem: Chronic Conditions and Co-morbidities  Goal: Patient's chronic conditions and co-morbidity symptoms are monitored and maintained or improved  3/21/2025 2305 by Marv Marshall RN  Outcome: Progressing  3/21/2025 1615 by Killian Casper RN  Outcome: Progressing  Flowsheets (Taken 3/21/2025 0800)  Care Plan - Patient's Chronic Conditions and Co-Morbidity Symptoms are Monitored and Maintained or Improved: Monitor and assess patient's chronic conditions and comorbid symptoms for stability, deterioration, or improvement     Problem: Discharge Planning  Goal: Discharge to home or other facility with appropriate resources  3/21/2025 2305 by Marv Marshall RN  Outcome: Progressing  3/21/2025 1615 by Killian Casper RN  Outcome: Progressing  Flowsheets (Taken 3/21/2025 0800)  Discharge to home or other facility with appropriate resources: Identify barriers to discharge with patient and caregiver     Problem: Safety - Adult  Goal: Free from fall injury  3/21/2025 2305 by Marv Marshall RN  Outcome: Progressing  3/21/2025 1615 by Killian Casper RN  Outcome: Progressing     Problem: ABCDS Injury Assessment  Goal: Absence of physical injury  3/21/2025 2305 by Marv Marshall RN  Outcome: Progressing  3/21/2025 1615 by Killian Casper RN  Outcome: Progressing     Problem: Skin/Tissue Integrity  Goal: Skin integrity remains intact  Description: 1.  Monitor for areas of redness and/or skin breakdown  2.  Assess vascular access sites hourly  3.  Every 4-6 hours minimum:  Change oxygen saturation probe site  4.  Every 4-6 hours:  If on nasal continuous positive airway pressure, respiratory therapy assess nares and determine need for appliance change or resting period  3/21/2025 2305 by Marv Marshall RN  Outcome: Progressing  3/21/2025 1615 by Killian Casper RN  Outcome: Progressing  Flowsheets (Taken 3/21/2025 0800)  Skin Integrity Remains Intact: Monitor for areas of redness and/or skin breakdown

## 2025-03-22 NOTE — PLAN OF CARE
Plan of Care Note    Patient seen and evaluated bedside.  X-ray.  Vital stable, patient resting comfortably  Reports good appetite, bowel movement and urine output.  CRP trending down, leukocytes WNL  UTI, patient reports no symptoms, continuing on Rocephin through 3/27/2025  Resolved headache, on Elavil  Cardiology signed off, no further clinical intervention.  No STAN needed per ID  Patient awaiting SNF placement, working with .      Matheus Gómez MD  Family Medicine Reident - PGY1  [facility], [place]  3/22/2025. 6:39 PM

## 2025-03-23 LAB
ANION GAP SERPL CALCULATED.3IONS-SCNC: 12 MMOL/L (ref 9–16)
BASOPHILS # BLD: 0.05 K/UL (ref 0–0.2)
BASOPHILS NFR BLD: 1 % (ref 0–2)
BUN SERPL-MCNC: 17 MG/DL (ref 8–23)
CALCIUM SERPL-MCNC: 9 MG/DL (ref 8.6–10.4)
CHLORIDE SERPL-SCNC: 100 MMOL/L (ref 98–107)
CO2 SERPL-SCNC: 20 MMOL/L (ref 20–31)
CREAT SERPL-MCNC: 1.3 MG/DL (ref 0.7–1.2)
CRP SERPL HS-MCNC: 105 MG/L (ref 0–5)
EOSINOPHIL # BLD: 0.22 K/UL (ref 0–0.44)
EOSINOPHILS RELATIVE PERCENT: 3 % (ref 1–4)
ERYTHROCYTE [DISTWIDTH] IN BLOOD BY AUTOMATED COUNT: 13.4 % (ref 11.8–14.4)
GFR, ESTIMATED: 54 ML/MIN/1.73M2
GLUCOSE BLD-MCNC: 131 MG/DL (ref 75–110)
GLUCOSE BLD-MCNC: 183 MG/DL (ref 75–110)
GLUCOSE BLD-MCNC: 198 MG/DL (ref 75–110)
GLUCOSE BLD-MCNC: 223 MG/DL (ref 75–110)
GLUCOSE SERPL-MCNC: 131 MG/DL (ref 74–99)
HCT VFR BLD AUTO: 37.4 % (ref 40.7–50.3)
HGB BLD-MCNC: 12.6 G/DL (ref 13–17)
IMM GRANULOCYTES # BLD AUTO: 0.19 K/UL (ref 0–0.3)
IMM GRANULOCYTES NFR BLD: 3 %
LYMPHOCYTES NFR BLD: 0.9 K/UL (ref 1.1–3.7)
LYMPHOCYTES RELATIVE PERCENT: 14 % (ref 24–43)
MAGNESIUM SERPL-MCNC: 2 MG/DL (ref 1.6–2.4)
MCH RBC QN AUTO: 31.3 PG (ref 25.2–33.5)
MCHC RBC AUTO-ENTMCNC: 33.7 G/DL (ref 28.4–34.8)
MCV RBC AUTO: 93 FL (ref 82.6–102.9)
MICROORGANISM SPEC CULT: ABNORMAL
MICROORGANISM SPEC CULT: NORMAL
MONOCYTES NFR BLD: 0.76 K/UL (ref 0.1–1.2)
MONOCYTES NFR BLD: 12 % (ref 3–12)
NEUTROPHILS NFR BLD: 67 % (ref 36–65)
NEUTS SEG NFR BLD: 4.3 K/UL (ref 1.5–8.1)
NRBC BLD-RTO: 0.5 PER 100 WBC
PLATELET # BLD AUTO: 119 K/UL (ref 138–453)
PMV BLD AUTO: 10.1 FL (ref 8.1–13.5)
POTASSIUM SERPL-SCNC: 3.4 MMOL/L (ref 3.7–5.3)
RBC # BLD AUTO: 4.02 M/UL (ref 4.21–5.77)
SERVICE CMNT-IMP: ABNORMAL
SERVICE CMNT-IMP: NORMAL
SODIUM SERPL-SCNC: 132 MMOL/L (ref 136–145)
SPECIMEN DESCRIPTION: ABNORMAL
SPECIMEN DESCRIPTION: NORMAL
WBC OTHER # BLD: 6.4 K/UL (ref 3.5–11.3)

## 2025-03-23 PROCEDURE — 1200000000 HC SEMI PRIVATE

## 2025-03-23 PROCEDURE — 6360000002 HC RX W HCPCS

## 2025-03-23 PROCEDURE — 6370000000 HC RX 637 (ALT 250 FOR IP)

## 2025-03-23 PROCEDURE — 2500000003 HC RX 250 WO HCPCS

## 2025-03-23 PROCEDURE — 85025 COMPLETE CBC W/AUTO DIFF WBC: CPT

## 2025-03-23 PROCEDURE — 97535 SELF CARE MNGMENT TRAINING: CPT

## 2025-03-23 PROCEDURE — 2500000003 HC RX 250 WO HCPCS: Performed by: INTERNAL MEDICINE

## 2025-03-23 PROCEDURE — 87070 CULTURE OTHR SPECIMN AEROBIC: CPT

## 2025-03-23 PROCEDURE — 6360000002 HC RX W HCPCS: Performed by: INTERNAL MEDICINE

## 2025-03-23 PROCEDURE — 83735 ASSAY OF MAGNESIUM: CPT

## 2025-03-23 PROCEDURE — 97530 THERAPEUTIC ACTIVITIES: CPT

## 2025-03-23 PROCEDURE — 82947 ASSAY GLUCOSE BLOOD QUANT: CPT

## 2025-03-23 PROCEDURE — 80048 BASIC METABOLIC PNL TOTAL CA: CPT

## 2025-03-23 PROCEDURE — 99232 SBSQ HOSP IP/OBS MODERATE 35: CPT | Performed by: INTERNAL MEDICINE

## 2025-03-23 PROCEDURE — 36415 COLL VENOUS BLD VENIPUNCTURE: CPT

## 2025-03-23 PROCEDURE — 86140 C-REACTIVE PROTEIN: CPT

## 2025-03-23 PROCEDURE — 99232 SBSQ HOSP IP/OBS MODERATE 35: CPT | Performed by: STUDENT IN AN ORGANIZED HEALTH CARE EDUCATION/TRAINING PROGRAM

## 2025-03-23 RX ORDER — LIDOCAINE 4 G/G
1 PATCH TOPICAL DAILY
Status: DISCONTINUED | OUTPATIENT
Start: 2025-03-23 | End: 2025-03-26 | Stop reason: HOSPADM

## 2025-03-23 RX ORDER — LEVOFLOXACIN 25 MG/ML
250 SOLUTION ORAL DAILY
Status: DISCONTINUED | OUTPATIENT
Start: 2025-03-23 | End: 2025-03-24

## 2025-03-23 RX ADMIN — ENTACAPONE 200 MG: 200 TABLET, FILM COATED ORAL at 08:38

## 2025-03-23 RX ADMIN — TAMSULOSIN HYDROCHLORIDE 0.4 MG: 0.4 CAPSULE ORAL at 08:37

## 2025-03-23 RX ADMIN — ROPINIROLE HYDROCHLORIDE 0.5 MG: 0.25 TABLET, FILM COATED ORAL at 13:45

## 2025-03-23 RX ADMIN — INSULIN LISPRO 2 UNITS: 100 INJECTION, SOLUTION INTRAVENOUS; SUBCUTANEOUS at 21:06

## 2025-03-23 RX ADMIN — FUROSEMIDE 20 MG: 10 INJECTION, SOLUTION INTRAMUSCULAR; INTRAVENOUS at 08:38

## 2025-03-23 RX ADMIN — ACETAMINOPHEN 1000 MG: 500 TABLET ORAL at 05:30

## 2025-03-23 RX ADMIN — SODIUM CHLORIDE, PRESERVATIVE FREE 10 ML: 5 INJECTION INTRAVENOUS at 08:39

## 2025-03-23 RX ADMIN — WATER 2000 MG: 1 INJECTION INTRAMUSCULAR; INTRAVENOUS; SUBCUTANEOUS at 17:43

## 2025-03-23 RX ADMIN — CARBIDOPA AND LEVODOPA 2 TABLET: 25; 100 TABLET ORAL at 13:45

## 2025-03-23 RX ADMIN — POTASSIUM BICARBONATE 40 MEQ: 782 TABLET, EFFERVESCENT ORAL at 09:43

## 2025-03-23 RX ADMIN — SODIUM CHLORIDE, PRESERVATIVE FREE 10 ML: 5 INJECTION INTRAVENOUS at 21:06

## 2025-03-23 RX ADMIN — ROPINIROLE HYDROCHLORIDE 0.5 MG: 0.25 TABLET, FILM COATED ORAL at 21:05

## 2025-03-23 RX ADMIN — INSULIN GLARGINE 5 UNITS: 100 INJECTION, SOLUTION SUBCUTANEOUS at 21:06

## 2025-03-23 RX ADMIN — DICLOFENAC SODIUM 2 G: 10 GEL TOPICAL at 22:00

## 2025-03-23 RX ADMIN — SUCRALFATE 1 G: 1 TABLET ORAL at 00:19

## 2025-03-23 RX ADMIN — PANTOPRAZOLE SODIUM 40 MG: 40 TABLET, DELAYED RELEASE ORAL at 16:44

## 2025-03-23 RX ADMIN — INSULIN LISPRO 2 UNITS: 100 INJECTION, SOLUTION INTRAVENOUS; SUBCUTANEOUS at 16:44

## 2025-03-23 RX ADMIN — PANTOPRAZOLE SODIUM 40 MG: 40 TABLET, DELAYED RELEASE ORAL at 08:37

## 2025-03-23 RX ADMIN — FUROSEMIDE 20 MG: 10 INJECTION, SOLUTION INTRAMUSCULAR; INTRAVENOUS at 16:44

## 2025-03-23 RX ADMIN — GABAPENTIN 100 MG: 100 CAPSULE ORAL at 21:05

## 2025-03-23 RX ADMIN — ENOXAPARIN SODIUM 30 MG: 100 INJECTION SUBCUTANEOUS at 08:38

## 2025-03-23 RX ADMIN — CARBIDOPA AND LEVODOPA 2 TABLET: 25; 100 TABLET ORAL at 21:05

## 2025-03-23 RX ADMIN — CLOPIDOGREL BISULFATE 75 MG: 75 TABLET, FILM COATED ORAL at 08:37

## 2025-03-23 RX ADMIN — INSULIN LISPRO 2 UNITS: 100 INJECTION, SOLUTION INTRAVENOUS; SUBCUTANEOUS at 11:13

## 2025-03-23 RX ADMIN — POLYMYXIN B SULFATE, BACITRACIN ZINC, NEOMYCIN SULFATE: 5000; 3.5; 4 OINTMENT TOPICAL at 21:05

## 2025-03-23 RX ADMIN — POLYMYXIN B SULFATE, BACITRACIN ZINC, NEOMYCIN SULFATE: 5000; 3.5; 4 OINTMENT TOPICAL at 08:39

## 2025-03-23 RX ADMIN — LEVOFLOXACIN 250 MG: 25 SOLUTION ORAL at 15:34

## 2025-03-23 RX ADMIN — SUCRALFATE 1 G: 1 TABLET ORAL at 16:43

## 2025-03-23 RX ADMIN — ROPINIROLE HYDROCHLORIDE 0.5 MG: 0.25 TABLET, FILM COATED ORAL at 08:37

## 2025-03-23 RX ADMIN — SUCRALFATE 1 G: 1 TABLET ORAL at 11:13

## 2025-03-23 RX ADMIN — ENTACAPONE 200 MG: 200 TABLET, FILM COATED ORAL at 21:05

## 2025-03-23 RX ADMIN — CARBIDOPA AND LEVODOPA 2 TABLET: 25; 100 TABLET ORAL at 08:38

## 2025-03-23 RX ADMIN — ENTACAPONE 200 MG: 200 TABLET, FILM COATED ORAL at 13:45

## 2025-03-23 RX ADMIN — AMITRIPTYLINE HYDROCHLORIDE 10 MG: 10 TABLET, FILM COATED ORAL at 21:05

## 2025-03-23 RX ADMIN — ATORVASTATIN CALCIUM 40 MG: 40 TABLET, FILM COATED ORAL at 08:37

## 2025-03-23 RX ADMIN — SUCRALFATE 1 G: 1 TABLET ORAL at 05:30

## 2025-03-23 ASSESSMENT — PAIN SCALES - GENERAL
PAINLEVEL_OUTOF10: 3
PAINLEVEL_OUTOF10: 0

## 2025-03-23 ASSESSMENT — PAIN DESCRIPTION - ORIENTATION: ORIENTATION: MID

## 2025-03-23 ASSESSMENT — PAIN DESCRIPTION - LOCATION: LOCATION: HEAD

## 2025-03-23 ASSESSMENT — PAIN DESCRIPTION - DESCRIPTORS: DESCRIPTORS: ACHING;SORE
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
## 2025-03-23 NOTE — PROGRESS NOTES
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  Past Surgical History:  has a past surgical history that includes Colonoscopy (2012); shoulder surgery (Right); pr colsc flx w/rmvl of tumor polyp lesion snare tq (N/A, 2017); Colonoscopy (2017); Cholecystectomy, laparoscopic (N/A, 10/29/2022); Esophagogastroduodenoscopy (2023); Upper gastrointestinal endoscopy (N/A, 2023); Upper gastrointestinal endoscopy (N/A, 2023); Cardiac procedure (N/A, 2024); Cardiac procedure (N/A, 8/3/2024); and ep device procedure (N/A, 2024).    Discharge Recommendations  Discharge Recommendations: Patient would benefit from continued therapy after discharge in order to increase pt strength, balance and independence with ADL tasks and functional transfers       Assessment  Performance deficits / Impairments: Decreased functional mobility ;Decreased ADL status;Decreased endurance;Decreased high-level IADLs;Decreased ROM;Decreased strength;Decreased coordination;Decreased balance;Decreased fine motor control;Decreased cognition  Prognosis: Good  REQUIRES OT FOLLOW-UP:  correct         Functional Mobility: Minimal assistance;Setup;Increased time to complete  Functional Mobility Skilled Clinical Factors: EOB>chair utilizing RW requiring increased time. Assist for balance and walker management pt demo short shuffled steps         Patient Education  Patient Education  Education Given To: Patient  Education Provided: Role of Therapy;Transfer Training;ADL Adaptive Strategies;Precautions  Education Provided Comments: proper hand and foot placement; balance maintence; walker management; importance of activity-G carry over  Education Method: Verbal  Barriers to Learning: None  Education Outcome: Continued education needed    Goals  Short Term Goals  Time Frame for Short Term Goals: pt will, by discharge  Short Term Goal 1: complete UB ADLs with supervision  Short Term Goal 2: complete LB ADLs and toileting tasks with min A and AE, as needed  Short Term Goal 3: dem 15+ minutes dynamic sitting tolerance unsupported with SBA in order to complete functional tasks  Short Term Goal 4: increase activity tolerance to 25+ minutes in order to participate in daily tasks  Short Term Goal 5: participate in meanignful tasks for 15+ minutes in order to increase B UE strength  Long Term Goals  Time Frame for Long Term Goals : NOTIFY OTR TO UPDATE GOALS AS APPROPRIATE    Plan  Occupational Therapy Plan  Times Per Week: 3-5x/wk  Current Treatment Recommendations: Strengthening, ROM, Balance training, Functional mobility training, Endurance training, Patient/Caregiver education & training, Self-Care / ADL, Safety education & training, Equipment evaluation, education, & procurement, Coordination training    Minutes  OT Individual Minutes  Time In: 0736  Time Out: 0819  Minutes: 43  Time Code Minutes   Timed Code Treatment Minutes: 43 Minutes    Electronically signed by TRANG German on 3/23/25 at 8:29 AM EDT

## 2025-03-23 NOTE — PLAN OF CARE
Problem: Chronic Conditions and Co-morbidities  Goal: Patient's chronic conditions and co-morbidity symptoms are monitored and maintained or improved  Outcome: Progressing     Problem: Discharge Planning  Goal: Discharge to home or other facility with appropriate resources  Outcome: Progressing  Flowsheets (Taken 3/23/2025 0800)  Discharge to home or other facility with appropriate resources:   Identify barriers to discharge with patient and caregiver   Arrange for needed discharge resources and transportation as appropriate   Identify discharge learning needs (meds, wound care, etc)   Refer to discharge planning if patient needs post-hospital services based on physician order or complex needs related to functional status, cognitive ability or social support system     Problem: Safety - Adult  Goal: Free from fall injury  Outcome: Progressing     Problem: ABCDS Injury Assessment  Goal: Absence of physical injury  Outcome: Progressing     Problem: Skin/Tissue Integrity  Goal: Skin integrity remains intact  Description: 1.  Monitor for areas of redness and/or skin breakdown  2.  Assess vascular access sites hourly  3.  Every 4-6 hours minimum:  Change oxygen saturation probe site  4.  Every 4-6 hours:  If on nasal continuous positive airway pressure, respiratory therapy assess nares and determine need for appliance change or resting period  Outcome: Progressing

## 2025-03-23 NOTE — PLAN OF CARE
Problem: Chronic Conditions and Co-morbidities  Goal: Patient's chronic conditions and co-morbidity symptoms are monitored and maintained or improved  3/23/2025 0119 by Corrina Waters RN  Outcome: Progressing  3/22/2025 1259 by Yaquelin Candelario RN  Outcome: Progressing     Problem: Discharge Planning  Goal: Discharge to home or other facility with appropriate resources  3/23/2025 0119 by Corrina Waters RN  Outcome: Progressing  3/22/2025 1259 by Yaquelin Candelario RN  Outcome: Progressing  Flowsheets (Taken 3/22/2025 0800)  Discharge to home or other facility with appropriate resources:   Identify barriers to discharge with patient and caregiver   Arrange for needed discharge resources and transportation as appropriate   Identify discharge learning needs (meds, wound care, etc)   Refer to discharge planning if patient needs post-hospital services based on physician order or complex needs related to functional status, cognitive ability or social support system     Problem: Safety - Adult  Goal: Free from fall injury  3/23/2025 0119 by Corrina Waters RN  Outcome: Progressing  3/22/2025 1259 by Yaquelin Candelario RN  Outcome: Progressing     Problem: ABCDS Injury Assessment  Goal: Absence of physical injury  3/23/2025 0119 by Corrina Waters RN  Outcome: Progressing  3/22/2025 1259 by Yaquelin Candelario RN  Outcome: Progressing     Problem: Skin/Tissue Integrity  Goal: Skin integrity remains intact  Description: 1.  Monitor for areas of redness and/or skin breakdown  2.  Assess vascular access sites hourly  3.  Every 4-6 hours minimum:  Change oxygen saturation probe site  4.  Every 4-6 hours:  If on nasal continuous positive airway pressure, respiratory therapy assess nares and determine need for appliance change or resting period  3/23/2025 0119 by Corrina Waters RN  Outcome: Progressing  3/22/2025 1259 by Yaquelin Candelario RN  Outcome: Progressing

## 2025-03-24 ENCOUNTER — HOSPITAL ENCOUNTER (OUTPATIENT)
Dept: SPEECH THERAPY | Age: 85
Setting detail: THERAPIES SERIES
Discharge: HOME | End: 2025-03-24
Payer: COMMERCIAL

## 2025-03-24 PROBLEM — G62.9 NEUROPATHY: Status: ACTIVE | Noted: 2025-03-24

## 2025-03-24 LAB
ANION GAP SERPL CALCULATED.3IONS-SCNC: 12 MMOL/L (ref 9–16)
BASOPHILS # BLD: 0 K/UL (ref 0–0.2)
BASOPHILS NFR BLD: 0 % (ref 0–2)
BUN SERPL-MCNC: 16 MG/DL (ref 8–23)
CALCIUM SERPL-MCNC: 8.9 MG/DL (ref 8.6–10.4)
CHLORIDE SERPL-SCNC: 100 MMOL/L (ref 98–107)
CO2 SERPL-SCNC: 22 MMOL/L (ref 20–31)
CREAT SERPL-MCNC: 1.3 MG/DL (ref 0.7–1.2)
CRP SERPL HS-MCNC: 59.5 MG/L (ref 0–5)
EOSINOPHIL # BLD: 0.22 K/UL (ref 0–0.4)
EOSINOPHILS RELATIVE PERCENT: 4 % (ref 1–4)
ERYTHROCYTE [DISTWIDTH] IN BLOOD BY AUTOMATED COUNT: 13.5 % (ref 11.8–14.4)
GFR, ESTIMATED: 54 ML/MIN/1.73M2
GLUCOSE BLD-MCNC: 137 MG/DL (ref 75–110)
GLUCOSE BLD-MCNC: 181 MG/DL (ref 75–110)
GLUCOSE BLD-MCNC: 185 MG/DL (ref 75–110)
GLUCOSE BLD-MCNC: 243 MG/DL (ref 75–110)
GLUCOSE SERPL-MCNC: 120 MG/DL (ref 74–99)
HCT VFR BLD AUTO: 38.2 % (ref 40.7–50.3)
HGB BLD-MCNC: 12.9 G/DL (ref 13–17)
IMM GRANULOCYTES # BLD AUTO: 0.11 K/UL (ref 0–0.3)
IMM GRANULOCYTES NFR BLD: 2 %
LYMPHOCYTES NFR BLD: 0.9 K/UL (ref 1–4.8)
LYMPHOCYTES RELATIVE PERCENT: 16 % (ref 24–44)
MCH RBC QN AUTO: 31.6 PG (ref 25.2–33.5)
MCHC RBC AUTO-ENTMCNC: 33.8 G/DL (ref 28.4–34.8)
MCV RBC AUTO: 93.6 FL (ref 82.6–102.9)
MONOCYTES NFR BLD: 0.34 K/UL (ref 0.1–0.8)
MONOCYTES NFR BLD: 6 % (ref 1–7)
MORPHOLOGY: NORMAL
NEUTROPHILS NFR BLD: 72 % (ref 36–66)
NEUTS SEG NFR BLD: 4.03 K/UL (ref 1.8–7.7)
NRBC BLD-RTO: 0.4 PER 100 WBC
PLATELET # BLD AUTO: 147 K/UL (ref 138–453)
PMV BLD AUTO: 10.4 FL (ref 8.1–13.5)
POTASSIUM SERPL-SCNC: 4.1 MMOL/L (ref 3.7–5.3)
RBC # BLD AUTO: 4.08 M/UL (ref 4.21–5.77)
SODIUM SERPL-SCNC: 134 MMOL/L (ref 136–145)
WBC OTHER # BLD: 5.6 K/UL (ref 3.5–11.3)

## 2025-03-24 PROCEDURE — 99232 SBSQ HOSP IP/OBS MODERATE 35: CPT | Performed by: INTERNAL MEDICINE

## 2025-03-24 PROCEDURE — 2500000003 HC RX 250 WO HCPCS

## 2025-03-24 PROCEDURE — G0545 PR INHERENT VISIT TO INPT: HCPCS | Performed by: INTERNAL MEDICINE

## 2025-03-24 PROCEDURE — 80048 BASIC METABOLIC PNL TOTAL CA: CPT

## 2025-03-24 PROCEDURE — 36415 COLL VENOUS BLD VENIPUNCTURE: CPT

## 2025-03-24 PROCEDURE — 6370000000 HC RX 637 (ALT 250 FOR IP)

## 2025-03-24 PROCEDURE — 97530 THERAPEUTIC ACTIVITIES: CPT

## 2025-03-24 PROCEDURE — 97110 THERAPEUTIC EXERCISES: CPT

## 2025-03-24 PROCEDURE — 82947 ASSAY GLUCOSE BLOOD QUANT: CPT

## 2025-03-24 PROCEDURE — 85025 COMPLETE CBC W/AUTO DIFF WBC: CPT

## 2025-03-24 PROCEDURE — 81001 URINALYSIS AUTO W/SCOPE: CPT

## 2025-03-24 PROCEDURE — 99232 SBSQ HOSP IP/OBS MODERATE 35: CPT | Performed by: STUDENT IN AN ORGANIZED HEALTH CARE EDUCATION/TRAINING PROGRAM

## 2025-03-24 PROCEDURE — 86140 C-REACTIVE PROTEIN: CPT

## 2025-03-24 PROCEDURE — 1200000000 HC SEMI PRIVATE

## 2025-03-24 PROCEDURE — 6360000002 HC RX W HCPCS

## 2025-03-24 RX ORDER — CIPROFLOXACIN 500 MG/1
500 TABLET, FILM COATED ORAL
Status: DISCONTINUED | OUTPATIENT
Start: 2025-03-25 | End: 2025-03-25

## 2025-03-24 RX ORDER — LEVOFLOXACIN 25 MG/ML
250 SOLUTION ORAL EVERY 24 HOURS
Status: DISCONTINUED | OUTPATIENT
Start: 2025-03-24 | End: 2025-03-24 | Stop reason: ALTCHOICE

## 2025-03-24 RX ORDER — LEVOFLOXACIN 500 MG/1
250 TABLET, FILM COATED ORAL EVERY 24 HOURS
Status: DISCONTINUED | OUTPATIENT
Start: 2025-03-24 | End: 2025-03-24

## 2025-03-24 RX ORDER — LEVOFLOXACIN 5 MG/ML
250 INJECTION, SOLUTION INTRAVENOUS EVERY 24 HOURS
Status: DISCONTINUED | OUTPATIENT
Start: 2025-03-24 | End: 2025-03-24

## 2025-03-24 RX ADMIN — ENTACAPONE 200 MG: 200 TABLET, FILM COATED ORAL at 23:04

## 2025-03-24 RX ADMIN — CARBIDOPA AND LEVODOPA 2 TABLET: 25; 100 TABLET ORAL at 23:05

## 2025-03-24 RX ADMIN — ROPINIROLE HYDROCHLORIDE 0.5 MG: 0.25 TABLET, FILM COATED ORAL at 08:27

## 2025-03-24 RX ADMIN — PANTOPRAZOLE SODIUM 40 MG: 40 TABLET, DELAYED RELEASE ORAL at 08:27

## 2025-03-24 RX ADMIN — SUCRALFATE 1 G: 1 TABLET ORAL at 00:26

## 2025-03-24 RX ADMIN — SODIUM CHLORIDE, PRESERVATIVE FREE 10 ML: 5 INJECTION INTRAVENOUS at 23:14

## 2025-03-24 RX ADMIN — ROPINIROLE HYDROCHLORIDE 0.5 MG: 0.25 TABLET, FILM COATED ORAL at 12:32

## 2025-03-24 RX ADMIN — DICLOFENAC SODIUM 2 G: 10 GEL TOPICAL at 08:28

## 2025-03-24 RX ADMIN — CARBIDOPA AND LEVODOPA 2 TABLET: 25; 100 TABLET ORAL at 08:27

## 2025-03-24 RX ADMIN — INSULIN GLARGINE 5 UNITS: 100 INJECTION, SOLUTION SUBCUTANEOUS at 23:14

## 2025-03-24 RX ADMIN — ENTACAPONE 200 MG: 200 TABLET, FILM COATED ORAL at 08:27

## 2025-03-24 RX ADMIN — ACETAMINOPHEN 1000 MG: 500 TABLET ORAL at 23:05

## 2025-03-24 RX ADMIN — ENOXAPARIN SODIUM 30 MG: 100 INJECTION SUBCUTANEOUS at 08:28

## 2025-03-24 RX ADMIN — ATORVASTATIN CALCIUM 40 MG: 40 TABLET, FILM COATED ORAL at 08:27

## 2025-03-24 RX ADMIN — INSULIN LISPRO 2 UNITS: 100 INJECTION, SOLUTION INTRAVENOUS; SUBCUTANEOUS at 23:13

## 2025-03-24 RX ADMIN — AMITRIPTYLINE HYDROCHLORIDE 10 MG: 10 TABLET, FILM COATED ORAL at 23:05

## 2025-03-24 RX ADMIN — FUROSEMIDE 40 MG: 40 TABLET ORAL at 08:27

## 2025-03-24 RX ADMIN — PANTOPRAZOLE SODIUM 40 MG: 40 TABLET, DELAYED RELEASE ORAL at 17:35

## 2025-03-24 RX ADMIN — DICLOFENAC SODIUM 2 G: 10 GEL TOPICAL at 23:15

## 2025-03-24 RX ADMIN — ENTACAPONE 200 MG: 200 TABLET, FILM COATED ORAL at 14:11

## 2025-03-24 RX ADMIN — INSULIN LISPRO 2 UNITS: 100 INJECTION, SOLUTION INTRAVENOUS; SUBCUTANEOUS at 17:35

## 2025-03-24 RX ADMIN — Medication 5 MG: at 23:05

## 2025-03-24 RX ADMIN — SUCRALFATE 1 G: 1 TABLET ORAL at 12:32

## 2025-03-24 RX ADMIN — INSULIN LISPRO 2 UNITS: 100 INJECTION, SOLUTION INTRAVENOUS; SUBCUTANEOUS at 12:28

## 2025-03-24 RX ADMIN — Medication 5 MG: at 00:59

## 2025-03-24 RX ADMIN — ACETAMINOPHEN 1000 MG: 500 TABLET ORAL at 06:04

## 2025-03-24 RX ADMIN — POLYMYXIN B SULFATE, BACITRACIN ZINC, NEOMYCIN SULFATE: 5000; 3.5; 4 OINTMENT TOPICAL at 23:15

## 2025-03-24 RX ADMIN — POLYMYXIN B SULFATE, BACITRACIN ZINC, NEOMYCIN SULFATE: 5000; 3.5; 4 OINTMENT TOPICAL at 08:28

## 2025-03-24 RX ADMIN — CARBIDOPA AND LEVODOPA 2 TABLET: 25; 100 TABLET ORAL at 14:11

## 2025-03-24 RX ADMIN — ROPINIROLE HYDROCHLORIDE 0.5 MG: 0.25 TABLET, FILM COATED ORAL at 23:05

## 2025-03-24 RX ADMIN — SUCRALFATE 1 G: 1 TABLET ORAL at 06:01

## 2025-03-24 RX ADMIN — SODIUM CHLORIDE, PRESERVATIVE FREE 10 ML: 5 INJECTION INTRAVENOUS at 08:29

## 2025-03-24 RX ADMIN — TAMSULOSIN HYDROCHLORIDE 0.4 MG: 0.4 CAPSULE ORAL at 08:27

## 2025-03-24 RX ADMIN — CLOPIDOGREL BISULFATE 75 MG: 75 TABLET, FILM COATED ORAL at 08:27

## 2025-03-24 RX ADMIN — LEVOFLOXACIN 250 MG: 25 SOLUTION ORAL at 12:36

## 2025-03-24 RX ADMIN — SUCRALFATE 1 G: 1 TABLET ORAL at 17:35

## 2025-03-24 ASSESSMENT — PAIN DESCRIPTION - LOCATION: LOCATION: GENERALIZED

## 2025-03-24 ASSESSMENT — PAIN SCALES - GENERAL: PAINLEVEL_OUTOF10: 4

## 2025-03-24 ASSESSMENT — ENCOUNTER SYMPTOMS: RESPIRATORY NEGATIVE: 1

## 2025-03-24 ASSESSMENT — PAIN SCALES - WONG BAKER: WONGBAKER_NUMERICALRESPONSE: NO HURT

## 2025-03-24 ASSESSMENT — PAIN DESCRIPTION - DESCRIPTORS: DESCRIPTORS: ACHING;DISCOMFORT;SORE

## 2025-03-24 NOTE — PROGRESS NOTES
[facility] Medicine Inpatient Service  [facility]  Progress Note    Date:   3/24/2025  Patient name:  Mina Gill  Date of admission:  3/18/2025  8:54 PM  MRN:   [id number]  YOB: 1940    SUBJECTIVE/Last 24 hours update:     Patient seen and evaluated at bedside.  He is feeling well this morning with no concerns at this time.    Infectious disease following with patient. Levaquin oral solution started yesterday for complicated UTI.  Patient reporting slight improvement of scrotal tenderness.    WBC at 5.6 this morning, CRP trending down at 59.5.    Awaiting SNF placement,  following with the patient.  Family agreeable.    HPI:     The patient is a 84 y.o.  male with PMH of Parkinson, CKD stage IIIa (BL Cr 1.4), CAD, NSTEMI, recent RLE foot cellulitis, dysphagia, GERD, SSS/heart block (on pacemaker)  who presented with complaints of weakness, fatigue and hemodynamic instability.     Patient presented by EMS to ED with complaints of weakness, fatigue, nausea, and vomiting for past couple of days.  The patient was found to be hypotensive on arrival.  Denies fever, chills, abdominal pain, dysuria, frequency, urgency, pain during urination.  No chest pain, palpitations , shortness of breath, diarrhea, constipation or any other associated symptoms     On examination the patient was at baseline mental status with slurred speech, which is also baseline.  Notable findings included tremors due to underlying Parkinson disease.  Vitals were significant for hypotension requiring fluid resuscitation, mild tachypnea and temperature of 99     Laboratory workup positive for leukocytosis (WBC 16.8), elevated lactate 2.9, repeat 3.1, elevated creatinine 2.1 (1.4).  UA positive for large leukocyte esterase.  Blood culture and urine culture pending.  Chest x-ray CT head and CTAP negative for acute pathology.  Hypokalemia potassium 3.4     The patient received 2 L fluid bolus followed by  ER  Resuming home meds Requip, carbidopa/levodopa and entacapone, gabapentin        Hypokalemia  Potassium on admission 3.4, replaced on admission  4.1 this morning, received replacement yesterday   Will monitor     T2DM  A1c 8.4  Blood glucose 120 this morning  Medium dose sliding  Hypoglycemia protocol  POCT X4     Dysphagia  History of dysphagia and slurred speech  Evaluated by speech therapist recently, no aspiration during evaluation  Started on dysphagia diet with thin liquids     GERD  Resuming Protonix     Hypertension  /64 this morning  Hypotension on admission, blood pressure soft after stabilized  Home meds Norvasc 5 and Imdur held        SSS/Complete Heart Block  History of sick sinus syndrome and complete heart  On biventricular cardiac pacemaker placed on 8/7/2024  Monitoring        COPD stage I  No respiratory distress on admission  On bronchodilator protocol     Diet: Dysphagia Pureed, thin liquid consistency  DVT prophylaxis: Lovenox 40 mg SC  GI prophylaxis: Protonix 40 mg daily        Consultations:   Consults: IP CONSULT TO FAMILY MEDICINE  IP CONSULT TO SOCIAL WORK        PT/OT     Above plan discussed with the patient, who agreed to the above plan      Plan will be discussed with the attending       Donya Zamora MD  Transitional Year Resident  [facility] [place]  3/24/2025, 8:06 AM

## 2025-03-24 NOTE — PLAN OF CARE
Family Medicine In-Patient Service  PLAN OF CARE NOTE      Patient seen and evaluated at bedside.  ANO X3, vital stable, resting comfortably.  Patient reporting good appetite, stool and urine output  Labs WNL  Continuing treatment for UTI, ID discontinued Rocephin, started on oral Cipro.  Stop date 4/2/2025  Per nurse, Mcdowell removed yesterday, concerns for blood in urine.  Hb staying stable, will order UA.   Holding Norvasc and Imdur; all other home meds resumed  Patient has no active complaints  Awaiting SNF placement        Matheus Gómez MD  Family Medicine Resident  [facility], [place]  3/24/2025 7:46 PM

## 2025-03-24 NOTE — FLOWSHEET NOTE
[x] [facility]  Outpatient Rehabilitation &  Therapy  [address]  [phone]  [phone] [] [facility]  Outpatient Rehabilitation &  Therapy  [address]  P: [phone]  F: [phone] [] [facility]  Outpatient Rehabilitation &  Therapy  [address]  P: [phone]  F: [phone] [] [facility]  Outpatient Rehabilitation &  Therapy  [address]  [phone]  [phone] [] [facility]  Outpatient Rehabilitation &  Therapy  [address]   P: [phone]  F: [phone]  [] [facility]  Outpatient Rehabilitation &  Therapy  [address]  P: [phone]  F: [phone] [] [facility]  Outpatient Rehabilitation &  Therapy  [address]  P: [phone]  F: [phone] [] [facility]  Outpatient Rehabilitation &  Therapy  [address]  P: [phone]  F: [phone] [] [facility]  Outpatient Rehabilitation &  Therapy  [address]  P: [phone]  F: [phone]  [] [facility] Outpatient Rehabilitation &  Therapy  [address]  P: [phone]  F: [phone]     Therapy Cancel/No Show note    Date: 3/24/2025  Patient: Mina Gill  : 1940  MRN: [id number]    Cancels/No Shows to date: 0    For today's appointment patient:    [x]  Cancelled    [] Rescheduled appointment    [] No-show     Reason given by patient:    []  Patient ill    []  Conflicting appointment    [] No transportation      [] Conflict with work    [] No reason given    [] Weather related    [] COVID-19    [x] Other:      Comments:  Pt hospitalized.       [] Next appointment was confirmed    Electronically signed by: Summer Becerra M.S., CF-SLP

## 2025-03-24 NOTE — PLAN OF CARE
Problem: Chronic Conditions and Co-morbidities  Goal: Patient's chronic conditions and co-morbidity symptoms are monitored and maintained or improved  3/23/2025 2253 by Corrina Waters RN  Outcome: Progressing  3/23/2025 1621 by Yaquelin Candelario RN  Outcome: Progressing     Problem: Discharge Planning  Goal: Discharge to home or other facility with appropriate resources  3/23/2025 2253 by Corrina Waters RN  Outcome: Progressing  3/23/2025 1621 by Yaquelin Candelario RN  Outcome: Progressing  Flowsheets (Taken 3/23/2025 0800)  Discharge to home or other facility with appropriate resources:   Identify barriers to discharge with patient and caregiver   Arrange for needed discharge resources and transportation as appropriate   Identify discharge learning needs (meds, wound care, etc)   Refer to discharge planning if patient needs post-hospital services based on physician order or complex needs related to functional status, cognitive ability or social support system     Problem: Safety - Adult  Goal: Free from fall injury  3/23/2025 2253 by Corrina Waters RN  Outcome: Progressing  3/23/2025 1621 by Yaquelin Candelario RN  Outcome: Progressing     Problem: ABCDS Injury Assessment  Goal: Absence of physical injury  3/23/2025 2253 by Corrina Waters RN  Outcome: Progressing  3/23/2025 1621 by Yaquelin Candelario RN  Outcome: Progressing     Problem: Skin/Tissue Integrity  Goal: Skin integrity remains intact  Description: 1.  Monitor for areas of redness and/or skin breakdown  2.  Assess vascular access sites hourly  3.  Every 4-6 hours minimum:  Change oxygen saturation probe site  4.  Every 4-6 hours:  If on nasal continuous positive airway pressure, respiratory therapy assess nares and determine need for appliance change or resting period  3/23/2025 2253 by Corrina Waters RN  Outcome: Progressing  3/23/2025 1621 by Yaquelin Candelario RN  Outcome: Progressing
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
## 2025-03-24 NOTE — PROGRESS NOTES
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  Past Surgical History:  has a past surgical history that includes Colonoscopy (2012); shoulder surgery (Right); pr colsc flx w/rmvl of tumor polyp lesion snare tq (N/A, 2017); Colonoscopy (2017); Cholecystectomy, laparoscopic (N/A, 10/29/2022); Esophagogastroduodenoscopy (2023); Upper gastrointestinal endoscopy (N/A, 2023); Upper gastrointestinal endoscopy (N/A, 2023); Cardiac procedure (N/A, 2024); Cardiac procedure (N/A, 8/3/2024); and ep device procedure (N/A, 2024).    Discharge Recommendations  Discharge Recommendations: Patient would benefit from continued therapy after discharge  PT Equipment Recommendations  Equipment Needed: No    Assessment  Body Structures, Functions, Activity Limitations Requiring Skilled Therapeutic Intervention: Decreased functional mobility ;Decreased ADL status;Decreased strength;Decreased high-level IADLs;Decreased balance;Decreased endurance;Decreased safe awareness;Decreased coordination;Decreased posture;Decreased body mechanics  Assessment: Pt presenting with mobility

## 2025-03-25 LAB
ANION GAP SERPL CALCULATED.3IONS-SCNC: 10 MMOL/L (ref 9–16)
BACTERIA URNS QL MICRO: ABNORMAL
BASOPHILS # BLD: 0 K/UL (ref 0–0.2)
BASOPHILS NFR BLD: 0 % (ref 0–2)
BILIRUB UR QL STRIP: NEGATIVE
BUN SERPL-MCNC: 16 MG/DL (ref 8–23)
CALCIUM SERPL-MCNC: 9 MG/DL (ref 8.6–10.4)
CASTS #/AREA URNS LPF: ABNORMAL /LPF (ref 0–8)
CHLORIDE SERPL-SCNC: 101 MMOL/L (ref 98–107)
CLARITY UR: CLEAR
CO2 SERPL-SCNC: 26 MMOL/L (ref 20–31)
COLOR UR: ABNORMAL
CREAT SERPL-MCNC: 1.3 MG/DL (ref 0.7–1.2)
CRP SERPL HS-MCNC: 34.3 MG/L (ref 0–5)
EOSINOPHIL # BLD: 0.2 K/UL (ref 0–0.4)
EOSINOPHILS RELATIVE PERCENT: 4 % (ref 1–4)
EPI CELLS #/AREA URNS HPF: ABNORMAL /HPF (ref 0–5)
ERYTHROCYTE [DISTWIDTH] IN BLOOD BY AUTOMATED COUNT: 13.2 % (ref 11.8–14.4)
GFR, ESTIMATED: 54 ML/MIN/1.73M2
GLUCOSE BLD-MCNC: 200 MG/DL (ref 75–110)
GLUCOSE BLD-MCNC: 208 MG/DL (ref 75–110)
GLUCOSE BLD-MCNC: 209 MG/DL (ref 75–110)
GLUCOSE SERPL-MCNC: 134 MG/DL (ref 74–99)
GLUCOSE UR STRIP-MCNC: NEGATIVE MG/DL
HCT VFR BLD AUTO: 36.2 % (ref 40.7–50.3)
HGB BLD-MCNC: 12.1 G/DL (ref 13–17)
HGB UR QL STRIP.AUTO: ABNORMAL
IMM GRANULOCYTES # BLD AUTO: 0.25 K/UL (ref 0–0.3)
IMM GRANULOCYTES NFR BLD: 5 %
KETONES UR STRIP-MCNC: NEGATIVE MG/DL
LEUKOCYTE ESTERASE UR QL STRIP: ABNORMAL
LYMPHOCYTES NFR BLD: 1.15 K/UL (ref 1–4.8)
LYMPHOCYTES RELATIVE PERCENT: 23 % (ref 24–44)
MAGNESIUM SERPL-MCNC: 2.1 MG/DL (ref 1.6–2.4)
MCH RBC QN AUTO: 31.3 PG (ref 25.2–33.5)
MCHC RBC AUTO-ENTMCNC: 33.4 G/DL (ref 28.4–34.8)
MCV RBC AUTO: 93.5 FL (ref 82.6–102.9)
MONOCYTES NFR BLD: 0.15 K/UL (ref 0.1–0.8)
MONOCYTES NFR BLD: 3 % (ref 1–7)
MORPHOLOGY: NORMAL
NEUTROPHILS NFR BLD: 65 % (ref 36–66)
NEUTS SEG NFR BLD: 3.25 K/UL (ref 1.8–7.7)
NITRITE UR QL STRIP: NEGATIVE
NRBC BLD-RTO: 0 PER 100 WBC
PH UR STRIP: 6 [PH] (ref 5–8)
PLATELET # BLD AUTO: 178 K/UL (ref 138–453)
PMV BLD AUTO: 9.5 FL (ref 8.1–13.5)
POTASSIUM SERPL-SCNC: 3.4 MMOL/L (ref 3.7–5.3)
PROT UR STRIP-MCNC: ABNORMAL MG/DL
RBC # BLD AUTO: 3.87 M/UL (ref 4.21–5.77)
RBC #/AREA URNS HPF: ABNORMAL /HPF (ref 0–4)
SODIUM SERPL-SCNC: 137 MMOL/L (ref 136–145)
SP GR UR STRIP: 1.01 (ref 1–1.03)
UROBILINOGEN UR STRIP-ACNC: NORMAL EU/DL (ref 0–1)
WBC #/AREA URNS HPF: ABNORMAL /HPF (ref 0–5)
WBC OTHER # BLD: 5 K/UL (ref 3.5–11.3)

## 2025-03-25 PROCEDURE — 6370000000 HC RX 637 (ALT 250 FOR IP)

## 2025-03-25 PROCEDURE — G0545 PR INHERENT VISIT TO INPT: HCPCS | Performed by: INTERNAL MEDICINE

## 2025-03-25 PROCEDURE — 97116 GAIT TRAINING THERAPY: CPT

## 2025-03-25 PROCEDURE — 2500000003 HC RX 250 WO HCPCS

## 2025-03-25 PROCEDURE — 36415 COLL VENOUS BLD VENIPUNCTURE: CPT

## 2025-03-25 PROCEDURE — 99232 SBSQ HOSP IP/OBS MODERATE 35: CPT | Performed by: INTERNAL MEDICINE

## 2025-03-25 PROCEDURE — 83735 ASSAY OF MAGNESIUM: CPT

## 2025-03-25 PROCEDURE — 86140 C-REACTIVE PROTEIN: CPT

## 2025-03-25 PROCEDURE — 97535 SELF CARE MNGMENT TRAINING: CPT

## 2025-03-25 PROCEDURE — 6360000002 HC RX W HCPCS

## 2025-03-25 PROCEDURE — 80048 BASIC METABOLIC PNL TOTAL CA: CPT

## 2025-03-25 PROCEDURE — 97110 THERAPEUTIC EXERCISES: CPT

## 2025-03-25 PROCEDURE — 99239 HOSP IP/OBS DSCHRG MGMT >30: CPT | Performed by: STUDENT IN AN ORGANIZED HEALTH CARE EDUCATION/TRAINING PROGRAM

## 2025-03-25 PROCEDURE — 82947 ASSAY GLUCOSE BLOOD QUANT: CPT

## 2025-03-25 PROCEDURE — 85025 COMPLETE CBC W/AUTO DIFF WBC: CPT

## 2025-03-25 PROCEDURE — 97530 THERAPEUTIC ACTIVITIES: CPT

## 2025-03-25 PROCEDURE — 6370000000 HC RX 637 (ALT 250 FOR IP): Performed by: INTERNAL MEDICINE

## 2025-03-25 PROCEDURE — 1200000000 HC SEMI PRIVATE

## 2025-03-25 RX ORDER — FLUCONAZOLE 200 MG/1
200 TABLET ORAL DAILY
Qty: 4 TABLET | Refills: 0 | Status: SHIPPED | OUTPATIENT
Start: 2025-03-26 | End: 2025-03-30

## 2025-03-25 RX ORDER — FLUCONAZOLE 200 MG/1
200 TABLET ORAL DAILY
Status: DISCONTINUED | OUTPATIENT
Start: 2025-03-25 | End: 2025-03-26 | Stop reason: HOSPADM

## 2025-03-25 RX ORDER — CIPROFLOXACIN 500 MG/1
500 TABLET, FILM COATED ORAL EVERY 12 HOURS SCHEDULED
Qty: 16 TABLET | Refills: 0 | Status: SHIPPED | OUTPATIENT
Start: 2025-03-25 | End: 2025-04-02

## 2025-03-25 RX ORDER — CIPROFLOXACIN 500 MG/1
500 TABLET, FILM COATED ORAL EVERY 12 HOURS SCHEDULED
Status: DISCONTINUED | OUTPATIENT
Start: 2025-03-25 | End: 2025-03-26 | Stop reason: HOSPADM

## 2025-03-25 RX ORDER — ENOXAPARIN SODIUM 100 MG/ML
40 INJECTION SUBCUTANEOUS DAILY
Status: DISCONTINUED | OUTPATIENT
Start: 2025-03-26 | End: 2025-03-26 | Stop reason: HOSPADM

## 2025-03-25 RX ORDER — FUROSEMIDE 40 MG/1
20 TABLET ORAL DAILY
Qty: 30 TABLET | Refills: 2 | Status: ON HOLD | OUTPATIENT
Start: 2025-03-25 | End: 2025-04-27

## 2025-03-25 RX ADMIN — TAMSULOSIN HYDROCHLORIDE 0.4 MG: 0.4 CAPSULE ORAL at 08:17

## 2025-03-25 RX ADMIN — CARBIDOPA AND LEVODOPA 2 TABLET: 25; 100 TABLET ORAL at 08:21

## 2025-03-25 RX ADMIN — PANTOPRAZOLE SODIUM 40 MG: 40 TABLET, DELAYED RELEASE ORAL at 16:34

## 2025-03-25 RX ADMIN — ENTACAPONE 200 MG: 200 TABLET, FILM COATED ORAL at 22:44

## 2025-03-25 RX ADMIN — ROPINIROLE HYDROCHLORIDE 0.5 MG: 0.25 TABLET, FILM COATED ORAL at 08:18

## 2025-03-25 RX ADMIN — SODIUM CHLORIDE, PRESERVATIVE FREE 10 ML: 5 INJECTION INTRAVENOUS at 22:37

## 2025-03-25 RX ADMIN — INSULIN LISPRO 2 UNITS: 100 INJECTION, SOLUTION INTRAVENOUS; SUBCUTANEOUS at 11:57

## 2025-03-25 RX ADMIN — POLYMYXIN B SULFATE, BACITRACIN ZINC, NEOMYCIN SULFATE: 5000; 3.5; 4 OINTMENT TOPICAL at 08:19

## 2025-03-25 RX ADMIN — FLUCONAZOLE 200 MG: 200 TABLET ORAL at 11:56

## 2025-03-25 RX ADMIN — AMITRIPTYLINE HYDROCHLORIDE 10 MG: 10 TABLET, FILM COATED ORAL at 22:44

## 2025-03-25 RX ADMIN — SUCRALFATE 1 G: 1 TABLET ORAL at 06:00

## 2025-03-25 RX ADMIN — Medication 5 MG: at 22:44

## 2025-03-25 RX ADMIN — ROPINIROLE HYDROCHLORIDE 0.5 MG: 0.25 TABLET, FILM COATED ORAL at 22:44

## 2025-03-25 RX ADMIN — PANTOPRAZOLE SODIUM 40 MG: 40 TABLET, DELAYED RELEASE ORAL at 08:17

## 2025-03-25 RX ADMIN — SODIUM CHLORIDE, PRESERVATIVE FREE 10 ML: 5 INJECTION INTRAVENOUS at 08:18

## 2025-03-25 RX ADMIN — SUCRALFATE 1 G: 1 TABLET ORAL at 11:57

## 2025-03-25 RX ADMIN — INSULIN GLARGINE 5 UNITS: 100 INJECTION, SOLUTION SUBCUTANEOUS at 22:42

## 2025-03-25 RX ADMIN — ROPINIROLE HYDROCHLORIDE 0.5 MG: 0.25 TABLET, FILM COATED ORAL at 13:56

## 2025-03-25 RX ADMIN — FUROSEMIDE 40 MG: 40 TABLET ORAL at 08:17

## 2025-03-25 RX ADMIN — POTASSIUM BICARBONATE 40 MEQ: 782 TABLET, EFFERVESCENT ORAL at 11:57

## 2025-03-25 RX ADMIN — CLOPIDOGREL BISULFATE 75 MG: 75 TABLET, FILM COATED ORAL at 08:17

## 2025-03-25 RX ADMIN — SUCRALFATE 1 G: 1 TABLET ORAL at 00:22

## 2025-03-25 RX ADMIN — CARBIDOPA AND LEVODOPA 2 TABLET: 25; 100 TABLET ORAL at 22:44

## 2025-03-25 RX ADMIN — ENTACAPONE 200 MG: 200 TABLET, FILM COATED ORAL at 13:56

## 2025-03-25 RX ADMIN — ENTACAPONE 200 MG: 200 TABLET, FILM COATED ORAL at 08:20

## 2025-03-25 RX ADMIN — DICLOFENAC SODIUM 2 G: 10 GEL TOPICAL at 22:48

## 2025-03-25 RX ADMIN — CIPROFLOXACIN HYDROCHLORIDE 500 MG: 500 TABLET, FILM COATED ORAL at 22:44

## 2025-03-25 RX ADMIN — ATORVASTATIN CALCIUM 40 MG: 40 TABLET, FILM COATED ORAL at 08:17

## 2025-03-25 RX ADMIN — INSULIN LISPRO 2 UNITS: 100 INJECTION, SOLUTION INTRAVENOUS; SUBCUTANEOUS at 22:41

## 2025-03-25 RX ADMIN — POLYMYXIN B SULFATE, BACITRACIN ZINC, NEOMYCIN SULFATE: 5000; 3.5; 4 OINTMENT TOPICAL at 22:35

## 2025-03-25 RX ADMIN — CARBIDOPA AND LEVODOPA 2 TABLET: 25; 100 TABLET ORAL at 13:56

## 2025-03-25 RX ADMIN — DICLOFENAC SODIUM 2 G: 10 GEL TOPICAL at 08:18

## 2025-03-25 RX ADMIN — SUCRALFATE 1 G: 1 TABLET ORAL at 16:34

## 2025-03-25 RX ADMIN — INSULIN LISPRO 2 UNITS: 100 INJECTION, SOLUTION INTRAVENOUS; SUBCUTANEOUS at 16:34

## 2025-03-25 RX ADMIN — ENOXAPARIN SODIUM 30 MG: 100 INJECTION SUBCUTANEOUS at 08:18

## 2025-03-25 RX ADMIN — CIPROFLOXACIN HYDROCHLORIDE 500 MG: 500 TABLET, FILM COATED ORAL at 08:21

## 2025-03-25 ASSESSMENT — ENCOUNTER SYMPTOMS: RESPIRATORY NEGATIVE: 1

## 2025-03-25 NOTE — PROGRESS NOTES
Pharmacy Note     Renal Dose Adjustment    Mina Gill is a 84 y.o. male. Pharmacist assessment of renally cleared medications.    Recent Labs     03/24/25  0553 03/25/25  0707   BUN 16 16       Recent Labs     03/24/25  0553 03/25/25  0707   CREATININE 1.3* 1.3*       Estimated Creatinine Clearance: 43 mL/min (A) (based on SCr of 1.3 mg/dL (H)).      Height:   Ht Readings from Last 1 Encounters:   03/19/25 1.702 m (5' 7\")     Weight:  Wt Readings from Last 1 Encounters:   03/24/25 83.9 kg (184 lb 15.5 oz)       The following medication dose has been adjusted based upon renal function per P&T Guidelines:             Enoxaparin 30 mg daily changed to 40 mg daily based on CrCl > 30 mL/min

## 2025-03-25 NOTE — DISCHARGE INSTR - COC
Continuity of Care Form    Patient Name: Mina Gill   :  1940  MRN:  [id number]    Admit date:  3/18/2025  Discharge date:  3/26/25    Code Status Order: Full Code   Advance Directives:     Admitting Physician:  Trevin Calvert MD  PCP: Tisha Glasgow MD    Discharging Nurse: MARCO ANTONIO Grover  Discharging Hospital Unit/Room#:   Discharging Unit Phone Number: [phone]    Emergency Contact:   Extended Emergency Contact Information  Primary Emergency Contact: Delvin Gill  Address: [address] [place] [address] [place] [address]  Home Phone: [phone]  Relation: Child  Secondary Emergency Contact: Ivone Gill           [place], [place]  Home Phone: [phone]  Mobile Phone: [phone]  Relation: Child    Past Surgical History:  Past Surgical History:   Procedure Laterality Date    CARDIAC PROCEDURE N/A 2024    ali / Left heart cath / coronary angiography performed by Lobito Tracy MD at [facility] CARDIAC CATH LAB    CARDIAC PROCEDURE N/A 8/3/2024    Insert temporary pacemaker performed by Guerita Jeffrey MD at [facility] CARDIAC CATH LAB    CHOLECYSTECTOMY, LAPAROSCOPIC N/A 10/29/2022    LAPROSCOPIC CHOLECYSTECTOMY performed by Cj Valencia DO at [facility] OR    COLONOSCOPY  2012    poor prep, tubular adenoma    COLONOSCOPY  2017    diverticulosis, multiple polyps as described, spot marking done, pathology-tubular adenoma x 4    EP DEVICE PROCEDURE N/A 2024    you / ppm implant / rm 3021 performed by Omid Stone MD at [facility] CARDIAC CATH LAB    ESOPHAGOGASTRODUODENOSCOPY  2023    NV COLSC FLX W/RMVL OF TUMOR POLYP LESION SNARE TQ N/A 2017    COLONOSCOPY POLYPECTOMY SNARE/COLD BIOPSY with tatooing and apc transverse colon performed by Ross Garcia MD at [facility] OR    SHOULDER SURGERY Right     rotator cuff    UPPER GASTROINTESTINAL ENDOSCOPY N/A 2023    EGD ESOPHAGOGASTRODUODENOSCOPY performed by Constantine Brown MD at [facility] OR    UPPER GASTROINTESTINAL  Documentation and Therapy:  Wound 03/19/25 Toe (Comment  which one) Right #2 (Active)   Wound Image   03/19/25 1110   Wound Etiology Traumatic 03/24/25 1600   Dressing Status Clean;Dry;Intact 03/25/25 0800   Wound Cleansed Not Cleansed 03/25/25 0800   Dressing/Treatment Antibacterial ointment;Open to air 03/25/25 0800   Dressing Change Due 03/19/25 03/19/25 1110   Wound Length (cm) 0.4 cm 03/19/25 1110   Wound Width (cm) 0.9 cm 03/19/25 1110   Wound Depth (cm) 0.1 cm 03/19/25 1110   Wound Surface Area (cm^2) 0.36 cm^2 03/19/25 1110   Wound Volume (cm^3) 0.036 cm^3 03/19/25 1110   Wound Assessment Pink/red 03/25/25 0800   Drainage Amount None (dry) 03/25/25 0800   Odor None 03/24/25 1600   Karena-wound Assessment Intact 03/24/25 1600   Number of days: 6        Elimination:  Continence:   Bowel: Yes and No  Bladder: Yes and No  Urinary Catheter: None   Colostomy/Ileostomy/Ileal Conduit: No       Date of Last BM: 3/25/2025    Intake/Output Summary (Last 24 hours) at 3/25/2025 1444  Last data filed at 3/25/2025 1145  Gross per 24 hour   Intake 300 ml   Output 1860 ml   Net -1560 ml     I/O last 3 completed shifts:  In: -   Out: 3035 [Urine:3035]    Safety Concerns:     At Risk for Falls    Impairments/Disabilities:      Vision    Nutrition Therapy:  Current Nutrition Therapy:   - Oral Diet:  Carb Control 4 carbs/meal (1800kcals/day) and Dysphagia - Pureed    Routes of Feeding: Oral  Liquids: No Restrictions  Daily Fluid Restriction: no  Last Modified Barium Swallow with Video (Video Swallowing Test): not done    Treatments at the Time of Hospital Discharge:   Respiratory Treatments:   Oxygen Therapy:  is not on home oxygen therapy.  Ventilator:    - No ventilator support    Rehab Therapies: Physical Therapy and Occupational Therapy  Weight Bearing Status/Restrictions: No weight bearing restrictions  Other Medical Equipment (for information only, NOT a DME order):  walker and bedside commode  Other Treatments:

## 2025-03-25 NOTE — PROGRESS NOTES
[facility] Medicine Inpatient Service  [facility]  Progress Note    Date:   3/25/2025  Patient name:  Mina Gill  Date of admission:  3/18/2025  8:54 PM  MRN:   [id number]  YOB: 1940    SUBJECTIVE/Last 24 hours update:     Patient seen and evaluated at bedside.  He is feeling well this morning with no concerns at this time.  -Patient continues to be vitally stable  -Mcdowell catheter removed yesterday; patient had 1.76 L out yesterday; we will continue to monitor urine output    -Infectious disease following with patient; started on oral Cipro. Stop date 4/2/2025   WBC at 5 this morning, CRP trending down at 34.3.    -Patient got accepted at [facility]; pending pre-CERT confirmation    HPI:     The patient is a 84 y.o.  male with PMH of Parkinson, CKD stage IIIa (BL Cr 1.4), CAD, NSTEMI, recent RLE foot cellulitis, dysphagia, GERD, SSS/heart block (on pacemaker)  who presented with complaints of weakness, fatigue and hemodynamic instability.     Patient presented by EMS to ED with complaints of weakness, fatigue, nausea, and vomiting for past couple of days.  The patient was found to be hypotensive on arrival.  Denies fever, chills, abdominal pain, dysuria, frequency, urgency, pain during urination.  No chest pain, palpitations , shortness of breath, diarrhea, constipation or any other associated symptoms     On examination the patient was at baseline mental status with slurred speech, which is also baseline.  Notable findings included tremors due to underlying Parkinson disease.  Vitals were significant for hypotension requiring fluid resuscitation, mild tachypnea and temperature of 99     Laboratory workup positive for leukocytosis (WBC 16.8), elevated lactate 2.9, repeat 3.1, elevated creatinine 2.1 (1.4).  UA positive for large leukocyte esterase.  Blood culture and urine culture pending.  Chest x-ray CT head and CTAP negative for acute pathology.  Hypokalemia  (ELAVIL) 10 MG tablet Take 1 tablet by mouth nightly    Provider, MD Otto   magnesium oxide (MAG-OX) 400 (240 Mg) MG tablet Take 1 tablet by mouth daily 10/29/24   Abraham Cash MD   fluticasone (FLONASE) 50 MCG/ACT nasal spray 2 sprays by Each Nostril route daily 10/28/24   Moyeed, MD Tisha   latanoprost (XALATAN) 0.005 % ophthalmic solution INSTILL ONE (1) DROP IN BOTH EYES EVERY NIGHT AT BEDTIME 10/10/24   Moyeed, MD Tisha   atorvastatin (LIPITOR) 40 MG tablet TAKE ONE (1) TABLET BY MOUTH ONCE DAILY 10/8/24   Moyeed, MD Tisha   lidocaine (LIDODERM) 5 % Place 1 patch onto the skin daily 12 hours on, 12 hours off. 10/3/24   Chuy Bailey MD   tamsulosin (FLOMAX) 0.4 MG capsule TAKE ONE (1) CAPSULE BY MOUTH DAILY 9/10/24   Moyeed, MD Tisha   clopidogrel (PLAVIX) 75 MG tablet TAKE 1 TABLET BY MOUTH ONCE DAILY -FOR ANTICOAGULANT 8/14/24   Moyeed, MD Tisha   nitroGLYCERIN (NITROSTAT) 0.4 MG SL tablet up to max of 3 total doses. If no relief after 1 dose, call 911. 6/9/22   Noemy Hogan APRN - ALEX       ALLERGIES:     Aspirin and Iodine      OBJECTIVE:       Vitals:    03/24/25 1945 03/24/25 2330 03/25/25 0400 03/25/25 0811   BP: 136/71 118/63 117/67 139/68   Pulse: 70 79 70 70   Resp: 17 24 19 23   Temp: 98.7 °F (37.1 °C) 98.7 °F (37.1 °C) 97.7 °F (36.5 °C) 97.4 °F (36.3 °C)   TempSrc: Oral Axillary Oral Oral   SpO2: 93% 99% 99% 98%   Weight:       Height:             Intake/Output Summary (Last 24 hours) at 3/25/2025 0838  Last data filed at 3/25/2025 0815  Gross per 24 hour   Intake --   Output 1960 ml   Net -1960 ml       PHYSICAL EXAM:  Physical Exam  Cardiovascular:      Rate and Rhythm: Normal rate and regular rhythm.   Pulmonary:      Effort: Pulmonary effort is normal.   Abdominal:      Tenderness: There is no abdominal tenderness.   Genitourinary:     Comments: Patient reports improvement of scrotal tenderness  Skin:     General: Skin is warm and dry.   Neurological:      Mental  glucose chewable tablet 16 g  4 tablet Oral PRN Marzena Casanova MD        dextrose bolus 10% 125 mL  125 mL IntraVENous PRN Marzena Casanova MD        Or    dextrose bolus 10% 250 mL  250 mL IntraVENous PRN Mazrena Casanova MD        glucagon injection 1 mg  1 mg SubCUTAneous PRN Marzena Casanova MD        dextrose 10 % infusion   IntraVENous Continuous PRN Marzena Casanova MD        insulin glargine (LANTUS) injection vial 5 Units  5 Units SubCUTAneous Nightly Marzena Casanova MD   5 Units at 03/24/25 2314    benzocaine-menthol (CEPACOL SORE THROAT) lozenge 1 lozenge  1 lozenge Oral Q2H PRN Matheus Gómez MD   1 lozenge at 03/19/25 2026       ASSESSMENT:     Principal Problem:    Sepsis (HCC)  Active Problems:    Coronary artery disease involving native heart without angina pectoris    Gastroesophageal reflux disease without esophagitis    Primary hypertension    Gram-neg septicemia (HCC)    Acute sepsis (HCC)    Chronic ulcer of toes, right, with fat layer exposed (HCC)    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 *      PLAN:     Weakness likely 2/2 Sepsis due to UTI vs RLE Wound vs Unknown Etiology - improved  New onset weakness, fatigue, nausea and vomiting on arrival  Hypotension, tachypnea, elevated leukocyte meeting criteria for SIRS  Given 2 L fluid bolus, on 75 mL maintenance hydration, blood pressure improving  CBC positive leukocytosis 16.2 on admission  Lactate elevated 2.9>3.1  CT head negative for acute pathology  CXR negative  CTAP  attending       Marzena Casanova MD  Family Medicine Resident, PGY-2  [facility] [place]  3/25/2025, 8:41 AM

## 2025-03-25 NOTE — CARE COORDINATION
Case Management   Daily Progress Note       Patient Name: Mina Gill                   YOB: 1940  Diagnosis: TAMARA (acute kidney injury) [N17.9]  Sepsis (HCC) [A41.9]  Acute sepsis (HCC) [A41.9]                       GMLOS: 3.5 days  Length of Stay: 6  days    Anticipated Discharge Date: Ready for discharge    Readmission Risk (Low < 19, Mod (19-27), High > 27): Readmission Risk Score: 16.4      Patient Transitional Goal: Skilled nursing facility    Current Transitional Plan    [] Home Independently    [] Home with HC    [x] Skilled Nursing Facility    [] Acute Rehabilitation    [] Long Term Acute Care (LTAC)    [] Other:     Plan for the Stay (Medical Management) : n/a      Workflow Continuation (Additional Notes) : precert pending in [place]. Auth # [id number]    1443 precert approved. Voicemail left for Waleska from [facility] to notify. Auth faxed    7530 received call from Waleska. They are able to accept tomorrow morning. She is requesting a 10 am transport. Transportation scheduled for 10 am tomorrow via [employer]. Patient updated. Patient's son, Tato, updated. AVS faxed. SAGE Stone RN  March 25, 2025
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
## 2025-03-25 NOTE — PROGRESS NOTES
Occupational Therapy  Occupational Therapy Daily Treatment Note  Facility/Department: [facility] CAR 2- STEPDOWN   Patient Name: Mina Gill        MRN: [id number]    : 1940    Date of Service: 3/25/2025    Chief Complaint   Patient presents with    Nausea    Vomiting     Zofran per EMS x10 mins PTA     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 (HCC), CKD (chronic kidney disease) stage 2, GFR 60-89 ml/min, CKD (chronic kidney disease) stage 3, GFR 30-59 ml/min (HCC), Diverticulosis of colon, GERD (gastroesophageal reflux disease), History of non-ST elevation myocardial infarction (NSTEMI) 2022, Impaired renal function, MRSA (methicillin resistant staph aureus) culture positive, Osteoarthritis of knee, Parkinson disease (HCC), Proteinuria, Skull fracture (HCC), Temporary loss of eyesight, and Tubular adenoma of colon.  Past Surgical History:  has a past surgical history that includes Colonoscopy (2012); shoulder surgery (Right); pr colsc flx w/rmvl of tumor polyp lesion snare tq (N/A, 2017); Colonoscopy (2017); Cholecystectomy, laparoscopic (N/A, 10/29/2022); Esophagogastroduodenoscopy (2023); Upper gastrointestinal endoscopy (N/A, 2023); Upper gastrointestinal endoscopy (N/A, 2023); Cardiac procedure (N/A, 2024); Cardiac procedure (N/A, 8/3/2024); and ep device procedure (N/A, 2024).    Discharge Recommendations  Discharge Recommendations: Patient would benefit from continued therapy after discharge     Assessment  Performance deficits / Impairments: Decreased functional mobility ;Decreased ADL status;Decreased safe awareness;Decreased endurance;Decreased balance;Decreased strength  Assessment: Pt would benefit from continued acute care and post acute care OT to address above listed deficits.  Prognosis: Good  Activity Tolerance  Activity Tolerance: Patient Tolerated treatment well;Limted by fatigue  Safety

## 2025-03-25 NOTE — PROGRESS NOTES
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  Past Surgical History:  has a past surgical history that includes Colonoscopy (2012); shoulder surgery (Right); pr colsc flx w/rmvl of tumor polyp lesion snare tq (N/A, 2017); Colonoscopy (2017); Cholecystectomy, laparoscopic (N/A, 10/29/2022); Esophagogastroduodenoscopy (2023); Upper gastrointestinal endoscopy (N/A, 2023); Upper gastrointestinal endoscopy (N/A, 2023); Cardiac procedure (N/A, 2024); Cardiac procedure (N/A, 8/3/2024); and ep device procedure (N/A, 2024).    Discharge Recommendations  Discharge Recommendations: Patient would benefit from continued therapy after discharge  PT Equipment Recommendations  Equipment Needed: No    Assessment  Body Structures, Functions, Activity Limitations Requiring Skilled Therapeutic Intervention: Decreased functional mobility ;Decreased ADL status;Decreased strength;Decreased high-level IADLs;Decreased balance;Decreased endurance;Decreased safe awareness;Decreased coordination;Decreased posture;Decreased body mechanics  Assessment: Pt presenting with mobility  Functional Limits  General  General Comments: pt laying supine in bed when writer entered room. pt retired to sitting in bedside chair with call light and bedside table within reach. Chair alarm set.  Subjective  Subjective: RN and pt agreeable to work with therapy. pt complains of 6/10 pain in LLE knee.  Pain addressed with activity, emotional support and repositioned for comfort.       Objective  Orientation  Orientation Level: Disoriented to time;Oriented to person;Oriented to place;Oriented to situation  Cognition  Overall Cognitive Status: Exceptions  Arousal/Alertness: Appropriate responses to stimuli  Following Commands: Follows multistep commands with increased time;Follows multistep commands with repitition  Safety Judgement: Decreased awareness of need for safety;Decreased awareness of need for assistance  Problem Solving: Assistance required to correct errors made;Assistance required to identify errors made;Decreased awareness of errors  Insights: Decreased awareness of deficits  Initiation: Requires cues for some  Sequencing: Does not require cues  Cognition Comment: Increased time for processing.    Mobility   Bed mobility  Supine to Sit: Minimal assistance  Sit to Supine: Unable to assess  Scooting: Minimal assistance  Bed Mobility Comments: HOB elevated ~50degrees. Assist to advance trunk.  vc for hand placement to advance hips forward at EOB.         Transfers  Sit to Stand: Minimal Assistance  Stand to Sit: Minimal Assistance  Comment: Assessed from EOB to RW. vc for hand placement and safety.  Increased time and effort.    Ambulation  Surface: Level tile  Device: Rolling Walker  Other Apparatus: Wheelchair follow  Assistance: Minimal assistance  Quality of Gait: small steps, heavy reliance on UEs, flexed knees and trunk, unsteady with no true LOB  Gait Deviations: Slow Ewa;Shuffles;Decreased step length;Decreased step height  Distance: 25ft  Comments: pt demonstrates flexed trunk in standing

## 2025-03-25 NOTE — DISCHARGE INSTRUCTIONS
-STOP TAKING IMDUR AND NORVASC (Blood pressure medications)  -START TAKING LASIX AS 20 mg instead of 40 mg ]  -START TAKING PROTONIX 40 mg   -CONTINUE TAKING CIPRO 500 mg BID till 4/2/25 (antibiotic)  -CONTINUE TAKING DIFLUCAN 200 mg daily till 3/29/25 (antifungal)  -CONTINUE TAKING OTHER MEDICATIONS AS THEY ARE

## 2025-03-25 NOTE — PLAN OF CARE
Family Medicine In-Patient Service  PLAN OF CARE NOTE      Patient seen and evaluated at bedside.  ANO X3, vital stable, resting comfortably  Reports good appetite, bowel movement and urine output  Labs normal  On Cipro 500 mg twice daily for UTI, stopped date 4/2/2025  On Diflucan for Candida, stop date 3/30/2025  Holding Norvasc and Imdur, all home meds resumed  Patient got accepted at [facility], will discharge tomorrow, awaiting transportation  Patient has no active complaints        Matheus Gómez MD  Family Medicine Resident  [facility], [place]  3/25/2025 7:22 PM

## 2025-03-25 NOTE — PROGRESS NOTES
Pharmacy Note     Renal Dose Adjustment    Mina Gill is a 84 y.o. male. Pharmacist assessment of renally cleared medications.    Recent Labs     03/24/25  0553 03/25/25  0707   BUN 16 16       Recent Labs     03/24/25  0553 03/25/25  0707   CREATININE 1.3* 1.3*       Estimated Creatinine Clearance: 43 mL/min (A) (based on SCr of 1.3 mg/dL (H)).      Height:   Ht Readings from Last 1 Encounters:   03/19/25 1.702 m (5' 7\")     Weight:  Wt Readings from Last 1 Encounters:   03/24/25 83.9 kg (184 lb 15.5 oz)       The following medication dose has been adjusted based upon renal function per P&T Guidelines:             Ciprofloxacin 500 mg Q24H changed to 500 mg Q12H based on CrCl > 30 mL/min

## 2025-03-26 ENCOUNTER — TELEPHONE (OUTPATIENT)
Dept: SURGERY | Age: 85
End: 2025-03-26

## 2025-03-26 VITALS
TEMPERATURE: 98 F | HEIGHT: 67 IN | DIASTOLIC BLOOD PRESSURE: 57 MMHG | BODY MASS INDEX: 29.03 KG/M2 | OXYGEN SATURATION: 98 % | SYSTOLIC BLOOD PRESSURE: 126 MMHG | WEIGHT: 184.97 LBS | HEART RATE: 70 BPM | RESPIRATION RATE: 23 BRPM

## 2025-03-26 LAB
ANION GAP SERPL CALCULATED.3IONS-SCNC: 12 MMOL/L (ref 9–16)
BASOPHILS # BLD: 0 K/UL (ref 0–0.2)
BASOPHILS NFR BLD: 0 % (ref 0–2)
BUN SERPL-MCNC: 16 MG/DL (ref 8–23)
CALCIUM SERPL-MCNC: 9.2 MG/DL (ref 8.6–10.4)
CHLORIDE SERPL-SCNC: 100 MMOL/L (ref 98–107)
CO2 SERPL-SCNC: 22 MMOL/L (ref 20–31)
CREAT SERPL-MCNC: 1.2 MG/DL (ref 0.7–1.2)
EOSINOPHIL # BLD: 0.25 K/UL (ref 0–0.4)
EOSINOPHILS RELATIVE PERCENT: 4 % (ref 1–4)
ERYTHROCYTE [DISTWIDTH] IN BLOOD BY AUTOMATED COUNT: 13.3 % (ref 11.8–14.4)
GFR, ESTIMATED: 59 ML/MIN/1.73M2
GLUCOSE BLD-MCNC: 137 MG/DL (ref 75–110)
GLUCOSE SERPL-MCNC: 161 MG/DL (ref 74–99)
HCT VFR BLD AUTO: 40.7 % (ref 40.7–50.3)
HGB BLD-MCNC: 13.4 G/DL (ref 13–17)
IMM GRANULOCYTES # BLD AUTO: 0 K/UL (ref 0–0.3)
IMM GRANULOCYTES NFR BLD: 0 %
LYMPHOCYTES NFR BLD: 1.07 K/UL (ref 1–4.8)
LYMPHOCYTES RELATIVE PERCENT: 17 % (ref 24–44)
MCH RBC QN AUTO: 31.2 PG (ref 25.2–33.5)
MCHC RBC AUTO-ENTMCNC: 32.9 G/DL (ref 28.4–34.8)
MCV RBC AUTO: 94.7 FL (ref 82.6–102.9)
MICROORGANISM SPEC CULT: ABNORMAL
MONOCYTES NFR BLD: 0.57 K/UL (ref 0.1–0.8)
MONOCYTES NFR BLD: 9 % (ref 1–7)
MORPHOLOGY: NORMAL
NEUTROPHILS NFR BLD: 70 % (ref 36–66)
NEUTS SEG NFR BLD: 4.41 K/UL (ref 1.8–7.7)
NRBC BLD-RTO: 0 PER 100 WBC
PLATELET # BLD AUTO: 196 K/UL (ref 138–453)
PMV BLD AUTO: 9.5 FL (ref 8.1–13.5)
POTASSIUM SERPL-SCNC: 3.7 MMOL/L (ref 3.7–5.3)
RBC # BLD AUTO: 4.3 M/UL (ref 4.21–5.77)
SERVICE CMNT-IMP: ABNORMAL
SODIUM SERPL-SCNC: 134 MMOL/L (ref 136–145)
SPECIMEN DESCRIPTION: ABNORMAL
WBC OTHER # BLD: 6.3 K/UL (ref 3.5–11.3)

## 2025-03-26 PROCEDURE — 82947 ASSAY GLUCOSE BLOOD QUANT: CPT

## 2025-03-26 PROCEDURE — 80048 BASIC METABOLIC PNL TOTAL CA: CPT

## 2025-03-26 PROCEDURE — 6360000002 HC RX W HCPCS

## 2025-03-26 PROCEDURE — 6370000000 HC RX 637 (ALT 250 FOR IP)

## 2025-03-26 PROCEDURE — 2500000003 HC RX 250 WO HCPCS

## 2025-03-26 PROCEDURE — 6370000000 HC RX 637 (ALT 250 FOR IP): Performed by: INTERNAL MEDICINE

## 2025-03-26 PROCEDURE — 85025 COMPLETE CBC W/AUTO DIFF WBC: CPT

## 2025-03-26 PROCEDURE — 36415 COLL VENOUS BLD VENIPUNCTURE: CPT

## 2025-03-26 RX ADMIN — ACETAMINOPHEN 1000 MG: 500 TABLET ORAL at 07:43

## 2025-03-26 RX ADMIN — SUCRALFATE 1 G: 1 TABLET ORAL at 05:26

## 2025-03-26 RX ADMIN — CIPROFLOXACIN HYDROCHLORIDE 500 MG: 500 TABLET, FILM COATED ORAL at 07:52

## 2025-03-26 RX ADMIN — FUROSEMIDE 40 MG: 40 TABLET ORAL at 07:44

## 2025-03-26 RX ADMIN — FLUCONAZOLE 200 MG: 200 TABLET ORAL at 07:51

## 2025-03-26 RX ADMIN — CARBIDOPA AND LEVODOPA 2 TABLET: 25; 100 TABLET ORAL at 07:52

## 2025-03-26 RX ADMIN — ATORVASTATIN CALCIUM 40 MG: 40 TABLET, FILM COATED ORAL at 07:44

## 2025-03-26 RX ADMIN — CLOPIDOGREL BISULFATE 75 MG: 75 TABLET, FILM COATED ORAL at 07:44

## 2025-03-26 RX ADMIN — ROPINIROLE HYDROCHLORIDE 0.5 MG: 0.25 TABLET, FILM COATED ORAL at 07:44

## 2025-03-26 RX ADMIN — DICLOFENAC SODIUM 2 G: 10 GEL TOPICAL at 07:45

## 2025-03-26 RX ADMIN — TAMSULOSIN HYDROCHLORIDE 0.4 MG: 0.4 CAPSULE ORAL at 07:44

## 2025-03-26 RX ADMIN — POLYMYXIN B SULFATE, BACITRACIN ZINC, NEOMYCIN SULFATE: 5000; 3.5; 4 OINTMENT TOPICAL at 07:45

## 2025-03-26 RX ADMIN — ENOXAPARIN SODIUM 40 MG: 100 INJECTION SUBCUTANEOUS at 07:44

## 2025-03-26 RX ADMIN — SODIUM CHLORIDE, PRESERVATIVE FREE 10 ML: 5 INJECTION INTRAVENOUS at 07:46

## 2025-03-26 RX ADMIN — PANTOPRAZOLE SODIUM 40 MG: 40 TABLET, DELAYED RELEASE ORAL at 07:44

## 2025-03-26 RX ADMIN — ENTACAPONE 200 MG: 200 TABLET, FILM COATED ORAL at 07:51

## 2025-03-26 RX ADMIN — SUCRALFATE 1 G: 1 TABLET ORAL at 00:41

## 2025-03-26 ASSESSMENT — ENCOUNTER SYMPTOMS: RESPIRATORY NEGATIVE: 1

## 2025-03-26 NOTE — DISCHARGE SUMMARY
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)   Gastroesophageal reflux disease without esophagitis   Primary hypertension   Coronary artery disease involving native heart without angina pectoris   Urinary tract infection without hematuria   Urethritis   Wounds, multiple open, lower extremity, right, initial encounter   Hypokalemia   Sick sinus syndrome (HCC)   Chronic obstructive pulmonary disease (HCC)   Uncontrolled type 2 diabetes mellitus with hyperglycemia (HCC)   Bacterial infection due to Morganella morganii   Gram negative septicemia (HCC)   Complicated UTI (urinary tract infection)   Neuropathy      Secondary Diagnoses:  has Syncope : posterior circulation stroke vs Neurocardiogenic syncope ; Abnormal stress test; Progressive angina (HCC); and ACS (acute coronary syndrome) ([facility]) on their pertinent problem list.      Admission Condition:  stable     Discharged Condition: stable    Hospital Course:       The patient is a 84 y.o.  male with PMH of Parkinson, CKD stage IIIa (BL Cr 1.4), CAD, NSTEMI, recent RLE foot cellulitis, dysphagia, GERD, SSS/heart block (on pacemaker)  who presented with complaints of weakness, fatigue and hemodynamic instability.  Patient was admitted for the management of sepsis likely secondary to UTI, and TAMARA on CKD.    Patient arrived to ED via EMS due to symptoms of weakness, fatigue, nausea and vomiting for several days.  Found to be hypotensive on arrival.  Denied recent illness, urinary symptoms, changes in respiratory status.   Patient at baseline mentation on admission.  Tremors visualized on physical exam likely secondary to underlying Parkinson disease.  Vital signs were significant for hypotension requiring fluid resuscitation, mild tachypnea, temperature of 99.0.  Laboratory workup in ED showing leukocytosis of 16.8, lactate at 2.9, 3.1 on repeat.  Creatinine elevated at 2.1 from 1.4 on prior BMP.  Chest x-ray negative for acute pathology.  Patient initially received 2 L fluid bolus followed by maintenance fluids resulting in improvement of blood pressure.  Empiric antibiotic coverage with Zosyn and vancomycin started in emergency room.    Over the course of hospital stay patient experienced worsening purulent urethral discharge along with scrotal tenderness and edema.  Edema likely secondary to fluid resuscitation versus epididymitis/prostatitis.  Urine cultures, blood cultures x 2 growing Morganella morganii.  Genital cultures growing Candida albicans.  T palladium positive, VDRL negative.  Infectious disease following with patient during admission, vancomycin and Zosyn switched to Rocephin during admission.  WBC and CRP trending downward.    Concerns for dysphagia.  Modified barium swallow study completed on 3/20 negative for aspiration.  Patient continued on soft and bite-size diet with thin/puréed liquids.    Podiatry also followed with patient due to wound of the right foot.  No surgical interventionrequired during admission.  Signed off.    Mcdowell catheter removed on 3/24, patient having adequate urine output at discharge.  Discharged on p.o. ciprofloxacin per infectious disease through 4/2/2025.  Vitally stable, alert and oriented x 3 on discharge to facility.    Consults:  ID, podiatry    Significant Diagnostic/theraputic interventions: Patient received antibiotic treatment for complicated UTI.  Fluid resuscitation on admission for hypotension, resolved at discharge.    Disposition:   SNF    Instructions to Patient:

## 2025-03-26 NOTE — TELEPHONE ENCOUNTER
Care Transitions Initial Follow Up Call    Outreach made within 2 business days of discharge: Yes    Patient: Mina Gill Patient : 1940   MRN: [id number]  Reason for Admission: Sepsis  Discharge Date: 3/26/25       Spoke with: Attempted to call, unable to leave VM.    Discharge department/facility: [facility]    Antonio Bucio MA

## 2025-03-26 NOTE — PROGRESS NOTES
[facility] Medicine Inpatient Service  [facility]  Progress Note    Date:   3/26/2025  Patient name:  Mina Gill  Date of admission:  3/18/2025  8:54 PM  MRN:   [id number]  YOB: 1940    SUBJECTIVE/Last 24 hours update:     Patient seen and examined up to chair eating breakfast.  States that he is feeling well and has no acute concerns at this time.  Continues to be vitally stable.    Status post Mcdowell catheter removal on 3/24.  Having adequate urine output.    On oral Cipro per ID until 4/2.    Accepted at [facility], scheduled to leave at 10 AM this morning.    HPI:     The patient is a 84 y.o.  male with PMH of Parkinson, CKD stage IIIa (BL Cr 1.4), CAD, NSTEMI, recent RLE foot cellulitis, dysphagia, GERD, SSS/heart block (on pacemaker)  who presented with complaints of weakness, fatigue and hemodynamic instability.     Patient presented by EMS to ED with complaints of weakness, fatigue, nausea, and vomiting for past couple of days.  The patient was found to be hypotensive on arrival.  Denies fever, chills, abdominal pain, dysuria, frequency, urgency, pain during urination.  No chest pain, palpitations , shortness of breath, diarrhea, constipation or any other associated symptoms     On examination the patient was at baseline mental status with slurred speech, which is also baseline.  Notable findings included tremors due to underlying Parkinson disease.  Vitals were significant for hypotension requiring fluid resuscitation, mild tachypnea and temperature of 99     Laboratory workup positive for leukocytosis (WBC 16.8), elevated lactate 2.9, repeat 3.1, elevated creatinine 2.1 (1.4).  UA positive for large leukocyte esterase.  Blood culture and urine culture pending.  Chest x-ray CT head and CTAP negative for acute pathology.  Hypokalemia potassium 3.4     The patient received 2 L fluid bolus followed by maintenance fluids resulting in improvement in blood

## 2025-03-27 ENCOUNTER — HOSPITAL ENCOUNTER (OUTPATIENT)
Age: 85
Setting detail: SPECIMEN
Discharge: HOME | End: 2025-03-27

## 2025-03-27 LAB
ANION GAP SERPL CALCULATED.3IONS-SCNC: 11 MMOL/L (ref 9–16)
BUN SERPL-MCNC: 15 MG/DL (ref 8–23)
CALCIUM SERPL-MCNC: 9 MG/DL (ref 8.8–10.2)
CHLORIDE SERPL-SCNC: 103 MMOL/L (ref 98–107)
CO2 SERPL-SCNC: 24 MMOL/L (ref 20–31)
CREAT SERPL-MCNC: 1.3 MG/DL (ref 0.7–1.2)
ERYTHROCYTE [DISTWIDTH] IN BLOOD BY AUTOMATED COUNT: 13.4 % (ref 11.8–14.4)
GFR, ESTIMATED: 56 ML/MIN/1.73M2
GLUCOSE SERPL-MCNC: 144 MG/DL (ref 82–115)
HCT VFR BLD AUTO: 36.9 % (ref 40.7–50.3)
HGB BLD-MCNC: 12.7 G/DL (ref 13–17)
MCH RBC QN AUTO: 32.6 PG (ref 25.2–33.5)
MCHC RBC AUTO-ENTMCNC: 34.4 G/DL (ref 28.4–34.8)
MCV RBC AUTO: 94.6 FL (ref 82.6–102.9)
NRBC BLD-RTO: 0 PER 100 WBC
PLATELET # BLD AUTO: 206 K/UL (ref 138–453)
PMV BLD AUTO: 9.6 FL (ref 8.1–13.5)
POTASSIUM SERPL-SCNC: 4 MMOL/L (ref 3.7–5.3)
RBC # BLD AUTO: 3.9 M/UL (ref 4.21–5.77)
SODIUM SERPL-SCNC: 139 MMOL/L (ref 136–145)
WBC OTHER # BLD: 5.7 K/UL (ref 3.5–11.3)

## 2025-03-27 PROCEDURE — 85027 COMPLETE CBC AUTOMATED: CPT

## 2025-03-27 PROCEDURE — 36415 COLL VENOUS BLD VENIPUNCTURE: CPT

## 2025-03-27 PROCEDURE — 80048 BASIC METABOLIC PNL TOTAL CA: CPT

## 2025-03-31 LAB — INTERVENTION: NORMAL

## 2025-04-03 ENCOUNTER — HOSPITAL ENCOUNTER (OUTPATIENT)
Age: 85
Setting detail: SPECIMEN
Discharge: HOME | End: 2025-04-03

## 2025-04-03 LAB
ANION GAP SERPL CALCULATED.3IONS-SCNC: 11 MMOL/L (ref 9–16)
BUN SERPL-MCNC: 16 MG/DL (ref 8–23)
CALCIUM SERPL-MCNC: 9.3 MG/DL (ref 8.8–10.2)
CHLORIDE SERPL-SCNC: 98 MMOL/L (ref 98–107)
CO2 SERPL-SCNC: 26 MMOL/L (ref 20–31)
CREAT SERPL-MCNC: 1.4 MG/DL (ref 0.7–1.2)
ERYTHROCYTE [DISTWIDTH] IN BLOOD BY AUTOMATED COUNT: 13.4 % (ref 11.8–14.4)
GFR, ESTIMATED: 51 ML/MIN/1.73M2
GLUCOSE SERPL-MCNC: 135 MG/DL (ref 82–115)
HCT VFR BLD AUTO: 39.7 % (ref 40.7–50.3)
HGB BLD-MCNC: 13.4 G/DL (ref 13–17)
MCH RBC QN AUTO: 32.9 PG (ref 25.2–33.5)
MCHC RBC AUTO-ENTMCNC: 33.8 G/DL (ref 28.4–34.8)
MCV RBC AUTO: 97.5 FL (ref 82.6–102.9)
NRBC BLD-RTO: 0 PER 100 WBC
PLATELET # BLD AUTO: 232 K/UL (ref 138–453)
PMV BLD AUTO: 9.3 FL (ref 8.1–13.5)
POTASSIUM SERPL-SCNC: 4.4 MMOL/L (ref 3.7–5.3)
RBC # BLD AUTO: 4.07 M/UL (ref 4.21–5.77)
SODIUM SERPL-SCNC: 135 MMOL/L (ref 136–145)
WBC OTHER # BLD: 6 K/UL (ref 3.5–11.3)

## 2025-04-03 PROCEDURE — 80048 BASIC METABOLIC PNL TOTAL CA: CPT

## 2025-04-03 PROCEDURE — 85027 COMPLETE CBC AUTOMATED: CPT

## 2025-04-03 PROCEDURE — 36415 COLL VENOUS BLD VENIPUNCTURE: CPT

## 2025-04-10 ENCOUNTER — HOSPITAL ENCOUNTER (OUTPATIENT)
Age: 85
Setting detail: SPECIMEN
Discharge: HOME | End: 2025-04-10

## 2025-04-10 LAB
ANION GAP SERPL CALCULATED.3IONS-SCNC: 11 MMOL/L (ref 9–16)
BUN SERPL-MCNC: 14 MG/DL (ref 8–23)
CALCIUM SERPL-MCNC: 9.2 MG/DL (ref 8.8–10.2)
CHLORIDE SERPL-SCNC: 99 MMOL/L (ref 98–107)
CO2 SERPL-SCNC: 24 MMOL/L (ref 20–31)
CREAT SERPL-MCNC: 1.1 MG/DL (ref 0.7–1.2)
ERYTHROCYTE [DISTWIDTH] IN BLOOD BY AUTOMATED COUNT: 13.3 % (ref 11.8–14.4)
GFR, ESTIMATED: 65 ML/MIN/1.73M2
GLUCOSE SERPL-MCNC: 180 MG/DL (ref 82–115)
HCT VFR BLD AUTO: 39.2 % (ref 40.7–50.3)
HGB BLD-MCNC: 13.5 G/DL (ref 13–17)
MCH RBC QN AUTO: 32.9 PG (ref 25.2–33.5)
MCHC RBC AUTO-ENTMCNC: 34.4 G/DL (ref 28.4–34.8)
MCV RBC AUTO: 95.6 FL (ref 82.6–102.9)
NRBC BLD-RTO: 0 PER 100 WBC
PLATELET # BLD AUTO: 156 K/UL (ref 138–453)
PMV BLD AUTO: 9.5 FL (ref 8.1–13.5)
POTASSIUM SERPL-SCNC: 4.3 MMOL/L (ref 3.7–5.3)
RBC # BLD AUTO: 4.1 M/UL (ref 4.21–5.77)
SODIUM SERPL-SCNC: 134 MMOL/L (ref 136–145)
WBC OTHER # BLD: 4.7 K/UL (ref 3.5–11.3)

## 2025-04-10 PROCEDURE — 36415 COLL VENOUS BLD VENIPUNCTURE: CPT

## 2025-04-10 PROCEDURE — 85027 COMPLETE CBC AUTOMATED: CPT

## 2025-04-10 PROCEDURE — 80048 BASIC METABOLIC PNL TOTAL CA: CPT

## 2025-04-25 RX ORDER — LATANOPROST 50 UG/ML
SOLUTION/ DROPS OPHTHALMIC
Qty: 2.5 ML | Refills: 2 | Status: SHIPPED | OUTPATIENT
Start: 2025-04-25

## 2025-04-25 NOTE — TELEPHONE ENCOUNTER
Last visit: 03/17/2025  Last Med refill: 10/10/2024  Does patient have enough medication for 72 hours: No    Next Visit Date:  Future Appointments   Date Time Provider Department Center   5/7/2025  1:15 PM Delfino Zazueta MD [facility]   5/12/2025  1:45 PM Leann Kolb DPM [facility]   8/4/2025  2:00 PM Felicia Gifford MD Neuro [facility] Neurology -       Health Maintenance   Topic Date Due    Shingles vaccine (1 of 2) Never done    Respiratory Syncytial Virus (RSV) Pregnant or age 60 yrs+ (1 - 1-dose 75+ series) Never done    Hepatitis B vaccine (2 of 3 - 19+ 3-dose series) 12/14/2023    COVID-19 Vaccine (6 - 2024-25 season) 09/01/2024    Lipids  12/15/2024    Annual Wellness Visit (Medicare Advantage)  01/01/2025    Flu vaccine (Season Ended) 08/01/2025    Depression Monitoring  03/17/2026    Colorectal Cancer Screen  09/19/2027    DTaP/Tdap/Td vaccine (4 - Td or Tdap) 11/16/2033    Pneumococcal 50+ years Vaccine  Completed    Hepatitis A vaccine  Aged Out    Hib vaccine  Aged Out    Polio vaccine  Aged Out    Meningococcal (ACWY) vaccine  Aged Out    Meningococcal B vaccine  Aged Out       Hemoglobin A1C (%)   Date Value   03/06/2025 8.4 (H)   12/16/2024 6.0   09/09/2024 6.5             ( goal A1C is < 7)   No components found for: \"LABMICR\"  No components found for: \"LDLCHOLESTEROL\", \"LDLCALC\"    (goal LDL is <100)   AST (U/L)   Date Value   03/18/2025 47     ALT (U/L)   Date Value   03/18/2025 29     BUN (mg/dL)   Date Value   04/10/2025 14     BP Readings from Last 3 Encounters:   03/26/25 (!) 126/57   03/17/25 109/67   03/07/25 122/76          (goal 120/80)    All Future Testing planned in [facility]  Lab Frequency Next Occurrence   Basic Metabolic Panel Once 08/18/2024   Lipid Panel Once 12/16/2024   FL MODIFIED BARIUM SWALLOW W VIDEO Once 12/16/2024               Patient Active Problem List:     Temporary loss of eyesight     Syncope : posterior circulation stroke vs

## 2025-04-26 ENCOUNTER — APPOINTMENT (OUTPATIENT)
Dept: CT IMAGING | Age: 85
End: 2025-04-26
Payer: COMMERCIAL

## 2025-04-26 ENCOUNTER — HOSPITAL ENCOUNTER (OUTPATIENT)
Age: 85
Setting detail: OBSERVATION
Discharge: HOME | End: 2025-04-27
Attending: EMERGENCY MEDICINE | Admitting: EMERGENCY MEDICINE
Payer: COMMERCIAL

## 2025-04-26 ENCOUNTER — APPOINTMENT (OUTPATIENT)
Dept: GENERAL RADIOLOGY | Age: 85
End: 2025-04-26
Payer: COMMERCIAL

## 2025-04-26 DIAGNOSIS — R09.02 HYPOXEMIA: Primary | ICD-10-CM

## 2025-04-26 DIAGNOSIS — I95.1 ORTHOSTATIC HYPOTENSION: ICD-10-CM

## 2025-04-26 DIAGNOSIS — R42 DIZZINESS: ICD-10-CM

## 2025-04-26 PROBLEM — R53.1 WEAKNESS GENERALIZED: Status: ACTIVE | Noted: 2025-04-26

## 2025-04-26 LAB
ALBUMIN SERPL-MCNC: 4.1 G/DL (ref 3.5–5.2)
ALBUMIN/GLOB SERPL: 1.4 {RATIO} (ref 1–2.5)
ALP SERPL-CCNC: 129 U/L (ref 40–129)
ALT SERPL-CCNC: 10 U/L (ref 10–50)
ANION GAP SERPL CALCULATED.3IONS-SCNC: 11 MMOL/L (ref 9–16)
AST SERPL-CCNC: 24 U/L (ref 10–50)
BASOPHILS # BLD: 0.03 K/UL (ref 0–0.2)
BASOPHILS NFR BLD: 1 % (ref 0–2)
BILIRUB SERPL-MCNC: 0.5 MG/DL (ref 0–1.2)
BILIRUB UR QL STRIP: NEGATIVE
BUN SERPL-MCNC: 17 MG/DL (ref 8–23)
CALCIUM SERPL-MCNC: 9.7 MG/DL (ref 8.6–10.4)
CHLORIDE SERPL-SCNC: 100 MMOL/L (ref 98–107)
CLARITY UR: CLEAR
CO2 SERPL-SCNC: 25 MMOL/L (ref 20–31)
COLOR UR: YELLOW
COMMENT: NORMAL
CREAT SERPL-MCNC: 1.3 MG/DL (ref 0.7–1.2)
EOSINOPHIL # BLD: 0.27 K/UL (ref 0–0.44)
EOSINOPHILS RELATIVE PERCENT: 4 % (ref 1–4)
ERYTHROCYTE [DISTWIDTH] IN BLOOD BY AUTOMATED COUNT: 13.4 % (ref 11.8–14.4)
GFR, ESTIMATED: 54 ML/MIN/1.73M2
GLUCOSE SERPL-MCNC: 153 MG/DL (ref 74–99)
GLUCOSE UR STRIP-MCNC: NEGATIVE MG/DL
HCT VFR BLD AUTO: 42.3 % (ref 40.7–50.3)
HGB BLD-MCNC: 14.3 G/DL (ref 13–17)
HGB UR QL STRIP.AUTO: NEGATIVE
IMM GRANULOCYTES # BLD AUTO: 0.03 K/UL (ref 0–0.3)
IMM GRANULOCYTES NFR BLD: 1 %
KETONES UR STRIP-MCNC: NEGATIVE MG/DL
LEUKOCYTE ESTERASE UR QL STRIP: NEGATIVE
LYMPHOCYTES NFR BLD: 1.67 K/UL (ref 1.1–3.7)
LYMPHOCYTES RELATIVE PERCENT: 27 % (ref 24–43)
MCH RBC QN AUTO: 31.6 PG (ref 25.2–33.5)
MCHC RBC AUTO-ENTMCNC: 33.8 G/DL (ref 28.4–34.8)
MCV RBC AUTO: 93.4 FL (ref 82.6–102.9)
MONOCYTES NFR BLD: 0.47 K/UL (ref 0.1–1.2)
MONOCYTES NFR BLD: 8 % (ref 3–12)
NEUTROPHILS NFR BLD: 59 % (ref 36–65)
NEUTS SEG NFR BLD: 3.65 K/UL (ref 1.5–8.1)
NITRITE UR QL STRIP: NEGATIVE
NRBC BLD-RTO: 0 PER 100 WBC
PH UR STRIP: 7 [PH] (ref 5–8)
PLATELET # BLD AUTO: 178 K/UL (ref 138–453)
PMV BLD AUTO: 8.8 FL (ref 8.1–13.5)
POTASSIUM SERPL-SCNC: 3.8 MMOL/L (ref 3.7–5.3)
PROT SERPL-MCNC: 7.1 G/DL (ref 6.6–8.7)
PROT UR STRIP-MCNC: NEGATIVE MG/DL
RBC # BLD AUTO: 4.53 M/UL (ref 4.21–5.77)
SODIUM SERPL-SCNC: 136 MMOL/L (ref 136–145)
SP GR UR STRIP: 1.03 (ref 1–1.03)
TROPONIN I SERPL HS-MCNC: 29 NG/L (ref 0–22)
TROPONIN I SERPL HS-MCNC: 30 NG/L (ref 0–22)
UROBILINOGEN UR STRIP-ACNC: NORMAL EU/DL (ref 0–1)
WBC OTHER # BLD: 6.1 K/UL (ref 3.5–11.3)

## 2025-04-26 PROCEDURE — 6360000002 HC RX W HCPCS

## 2025-04-26 PROCEDURE — 97116 GAIT TRAINING THERAPY: CPT

## 2025-04-26 PROCEDURE — 71260 CT THORAX DX C+: CPT

## 2025-04-26 PROCEDURE — 96361 HYDRATE IV INFUSION ADD-ON: CPT

## 2025-04-26 PROCEDURE — 6360000004 HC RX CONTRAST MEDICATION: Performed by: EMERGENCY MEDICINE

## 2025-04-26 PROCEDURE — 94760 N-INVAS EAR/PLS OXIMETRY 1: CPT

## 2025-04-26 PROCEDURE — 81003 URINALYSIS AUTO W/O SCOPE: CPT

## 2025-04-26 PROCEDURE — 93005 ELECTROCARDIOGRAM TRACING: CPT | Performed by: EMERGENCY MEDICINE

## 2025-04-26 PROCEDURE — 84484 ASSAY OF TROPONIN QUANT: CPT

## 2025-04-26 PROCEDURE — 99285 EMERGENCY DEPT VISIT HI MDM: CPT

## 2025-04-26 PROCEDURE — 6370000000 HC RX 637 (ALT 250 FOR IP)

## 2025-04-26 PROCEDURE — 96372 THER/PROPH/DIAG INJ SC/IM: CPT

## 2025-04-26 PROCEDURE — 99223 1ST HOSP IP/OBS HIGH 75: CPT | Performed by: PSYCHIATRY & NEUROLOGY

## 2025-04-26 PROCEDURE — 85025 COMPLETE CBC W/AUTO DIFF WBC: CPT

## 2025-04-26 PROCEDURE — G0378 HOSPITAL OBSERVATION PER HR: HCPCS

## 2025-04-26 PROCEDURE — 6370000000 HC RX 637 (ALT 250 FOR IP): Performed by: EMERGENCY MEDICINE

## 2025-04-26 PROCEDURE — 97161 PT EVAL LOW COMPLEX 20 MIN: CPT

## 2025-04-26 PROCEDURE — 71045 X-RAY EXAM CHEST 1 VIEW: CPT

## 2025-04-26 PROCEDURE — 2580000003 HC RX 258: Performed by: EMERGENCY MEDICINE

## 2025-04-26 PROCEDURE — 96374 THER/PROPH/DIAG INJ IV PUSH: CPT

## 2025-04-26 PROCEDURE — 80053 COMPREHEN METABOLIC PANEL: CPT

## 2025-04-26 RX ORDER — CLOPIDOGREL BISULFATE 75 MG/1
75 TABLET ORAL DAILY
Status: DISCONTINUED | OUTPATIENT
Start: 2025-04-26 | End: 2025-04-27 | Stop reason: HOSPADM

## 2025-04-26 RX ORDER — ONDANSETRON 4 MG/1
4 TABLET, ORALLY DISINTEGRATING ORAL EVERY 8 HOURS PRN
Status: DISCONTINUED | OUTPATIENT
Start: 2025-04-26 | End: 2025-04-27 | Stop reason: HOSPADM

## 2025-04-26 RX ORDER — ACETAMINOPHEN 650 MG/1
650 SUPPOSITORY RECTAL EVERY 6 HOURS PRN
Status: DISCONTINUED | OUTPATIENT
Start: 2025-04-26 | End: 2025-04-27 | Stop reason: HOSPADM

## 2025-04-26 RX ORDER — ALLOPURINOL 100 MG/1
100 TABLET ORAL DAILY
Status: DISCONTINUED | OUTPATIENT
Start: 2025-04-26 | End: 2025-04-27 | Stop reason: HOSPADM

## 2025-04-26 RX ORDER — ALBUTEROL SULFATE 90 UG/1
2 INHALANT RESPIRATORY (INHALATION) EVERY 6 HOURS PRN
Status: DISCONTINUED | OUTPATIENT
Start: 2025-04-26 | End: 2025-04-27 | Stop reason: HOSPADM

## 2025-04-26 RX ORDER — ACETAMINOPHEN 500 MG
1000 TABLET ORAL ONCE
Status: COMPLETED | OUTPATIENT
Start: 2025-04-26 | End: 2025-04-26

## 2025-04-26 RX ORDER — ACETAMINOPHEN 325 MG/1
650 TABLET ORAL EVERY 6 HOURS PRN
Status: DISCONTINUED | OUTPATIENT
Start: 2025-04-26 | End: 2025-04-27 | Stop reason: HOSPADM

## 2025-04-26 RX ORDER — FUROSEMIDE 20 MG/1
20 TABLET ORAL DAILY
Status: DISCONTINUED | OUTPATIENT
Start: 2025-04-26 | End: 2025-04-27 | Stop reason: HOSPADM

## 2025-04-26 RX ORDER — IOPAMIDOL 755 MG/ML
75 INJECTION, SOLUTION INTRAVASCULAR
Status: COMPLETED | OUTPATIENT
Start: 2025-04-26 | End: 2025-04-26

## 2025-04-26 RX ORDER — SODIUM CHLORIDE 9 MG/ML
INJECTION, SOLUTION INTRAVENOUS CONTINUOUS
Status: DISCONTINUED | OUTPATIENT
Start: 2025-04-26 | End: 2025-04-27 | Stop reason: HOSPADM

## 2025-04-26 RX ORDER — KETOROLAC TROMETHAMINE 15 MG/ML
15 INJECTION, SOLUTION INTRAMUSCULAR; INTRAVENOUS ONCE
Status: COMPLETED | OUTPATIENT
Start: 2025-04-26 | End: 2025-04-26

## 2025-04-26 RX ORDER — ROPINIROLE 0.5 MG/1
0.5 TABLET, FILM COATED ORAL 3 TIMES DAILY
Status: DISCONTINUED | OUTPATIENT
Start: 2025-04-26 | End: 2025-04-27 | Stop reason: HOSPADM

## 2025-04-26 RX ORDER — CARBIDOPA, LEVODOPA AND ENTACAPONE 50; 200; 200 MG/1; MG/1; MG/1
1 TABLET, FILM COATED ORAL 3 TIMES DAILY
Status: DISCONTINUED | OUTPATIENT
Start: 2025-04-26 | End: 2025-04-27 | Stop reason: HOSPADM

## 2025-04-26 RX ORDER — SUCRALFATE 1 G/1
1 TABLET ORAL EVERY 6 HOURS SCHEDULED
Status: DISCONTINUED | OUTPATIENT
Start: 2025-04-26 | End: 2025-04-27 | Stop reason: HOSPADM

## 2025-04-26 RX ORDER — AMITRIPTYLINE HYDROCHLORIDE 10 MG/1
10 TABLET ORAL NIGHTLY
Status: DISCONTINUED | OUTPATIENT
Start: 2025-04-26 | End: 2025-04-27

## 2025-04-26 RX ORDER — LANOLIN ALCOHOL/MO/W.PET/CERES
400 CREAM (GRAM) TOPICAL DAILY
Status: DISCONTINUED | OUTPATIENT
Start: 2025-04-26 | End: 2025-04-27 | Stop reason: HOSPADM

## 2025-04-26 RX ORDER — PANTOPRAZOLE SODIUM 20 MG/1
20 TABLET, DELAYED RELEASE ORAL DAILY
Status: DISCONTINUED | OUTPATIENT
Start: 2025-04-26 | End: 2025-04-27 | Stop reason: HOSPADM

## 2025-04-26 RX ORDER — POTASSIUM CHLORIDE 7.45 MG/ML
10 INJECTION INTRAVENOUS PRN
Status: DISCONTINUED | OUTPATIENT
Start: 2025-04-26 | End: 2025-04-27 | Stop reason: HOSPADM

## 2025-04-26 RX ORDER — TAMSULOSIN HYDROCHLORIDE 0.4 MG/1
0.4 CAPSULE ORAL DAILY
Status: DISCONTINUED | OUTPATIENT
Start: 2025-04-26 | End: 2025-04-27 | Stop reason: HOSPADM

## 2025-04-26 RX ORDER — MAGNESIUM SULFATE IN WATER 40 MG/ML
2000 INJECTION, SOLUTION INTRAVENOUS PRN
Status: DISCONTINUED | OUTPATIENT
Start: 2025-04-26 | End: 2025-04-27 | Stop reason: HOSPADM

## 2025-04-26 RX ORDER — ENOXAPARIN SODIUM 100 MG/ML
40 INJECTION SUBCUTANEOUS DAILY
Status: DISCONTINUED | OUTPATIENT
Start: 2025-04-26 | End: 2025-04-27 | Stop reason: HOSPADM

## 2025-04-26 RX ORDER — ATORVASTATIN CALCIUM 40 MG/1
40 TABLET, FILM COATED ORAL DAILY
Status: DISCONTINUED | OUTPATIENT
Start: 2025-04-26 | End: 2025-04-27 | Stop reason: HOSPADM

## 2025-04-26 RX ORDER — SODIUM CHLORIDE 0.9 % (FLUSH) 0.9 %
5-40 SYRINGE (ML) INJECTION EVERY 12 HOURS SCHEDULED
Status: DISCONTINUED | OUTPATIENT
Start: 2025-04-26 | End: 2025-04-27 | Stop reason: HOSPADM

## 2025-04-26 RX ORDER — SODIUM CHLORIDE 0.9 % (FLUSH) 0.9 %
5-40 SYRINGE (ML) INJECTION PRN
Status: DISCONTINUED | OUTPATIENT
Start: 2025-04-26 | End: 2025-04-27 | Stop reason: HOSPADM

## 2025-04-26 RX ORDER — GABAPENTIN 100 MG/1
100 CAPSULE ORAL 3 TIMES DAILY
Status: DISCONTINUED | OUTPATIENT
Start: 2025-04-26 | End: 2025-04-27 | Stop reason: HOSPADM

## 2025-04-26 RX ORDER — ONDANSETRON 2 MG/ML
4 INJECTION INTRAMUSCULAR; INTRAVENOUS EVERY 6 HOURS PRN
Status: DISCONTINUED | OUTPATIENT
Start: 2025-04-26 | End: 2025-04-27 | Stop reason: HOSPADM

## 2025-04-26 RX ORDER — POTASSIUM CHLORIDE 1500 MG/1
40 TABLET, EXTENDED RELEASE ORAL PRN
Status: DISCONTINUED | OUTPATIENT
Start: 2025-04-26 | End: 2025-04-27 | Stop reason: HOSPADM

## 2025-04-26 RX ORDER — SODIUM CHLORIDE 9 MG/ML
INJECTION, SOLUTION INTRAVENOUS PRN
Status: DISCONTINUED | OUTPATIENT
Start: 2025-04-26 | End: 2025-04-27 | Stop reason: HOSPADM

## 2025-04-26 RX ORDER — POLYETHYLENE GLYCOL 3350 17 G/17G
17 POWDER, FOR SOLUTION ORAL DAILY PRN
Status: DISCONTINUED | OUTPATIENT
Start: 2025-04-26 | End: 2025-04-27 | Stop reason: HOSPADM

## 2025-04-26 RX ADMIN — SODIUM CHLORIDE: 0.9 INJECTION, SOLUTION INTRAVENOUS at 18:39

## 2025-04-26 RX ADMIN — ENOXAPARIN SODIUM 40 MG: 100 INJECTION SUBCUTANEOUS at 17:49

## 2025-04-26 RX ADMIN — GABAPENTIN 100 MG: 100 CAPSULE ORAL at 17:48

## 2025-04-26 RX ADMIN — CLOPIDOGREL BISULFATE 75 MG: 75 TABLET, FILM COATED ORAL at 17:48

## 2025-04-26 RX ADMIN — KETOROLAC TROMETHAMINE 15 MG: 15 INJECTION, SOLUTION INTRAMUSCULAR; INTRAVENOUS at 08:30

## 2025-04-26 RX ADMIN — CARBIDOPA, LEVODOPA AND ENTACAPONE 1 TABLET: 50; 200; 200 TABLET, FILM COATED ORAL at 17:59

## 2025-04-26 RX ADMIN — ALLOPURINOL 100 MG: 100 TABLET ORAL at 21:47

## 2025-04-26 RX ADMIN — FUROSEMIDE 20 MG: 20 TABLET ORAL at 17:48

## 2025-04-26 RX ADMIN — ROPINIROLE HYDROCHLORIDE 0.5 MG: 0.5 TABLET, FILM COATED ORAL at 17:48

## 2025-04-26 RX ADMIN — IOPAMIDOL 75 ML: 755 INJECTION, SOLUTION INTRAVENOUS at 06:41

## 2025-04-26 RX ADMIN — PANTOPRAZOLE SODIUM 20 MG: 20 TABLET, DELAYED RELEASE ORAL at 21:47

## 2025-04-26 RX ADMIN — CARBIDOPA, LEVODOPA AND ENTACAPONE 1 TABLET: 50; 200; 200 TABLET, FILM COATED ORAL at 21:47

## 2025-04-26 RX ADMIN — Medication 400 MG: at 17:49

## 2025-04-26 RX ADMIN — AMITRIPTYLINE HYDROCHLORIDE 10 MG: 10 TABLET, FILM COATED ORAL at 21:47

## 2025-04-26 RX ADMIN — ATORVASTATIN CALCIUM 40 MG: 40 TABLET, FILM COATED ORAL at 17:49

## 2025-04-26 RX ADMIN — TAMSULOSIN HYDROCHLORIDE 0.4 MG: 0.4 CAPSULE ORAL at 17:49

## 2025-04-26 RX ADMIN — DICLOFENAC SODIUM 4 G: 10 GEL TOPICAL at 18:40

## 2025-04-26 RX ADMIN — SUCRALFATE 1 G: 1 TABLET ORAL at 17:48

## 2025-04-26 RX ADMIN — SUCRALFATE 1 G: 1 TABLET ORAL at 23:14

## 2025-04-26 RX ADMIN — ACETAMINOPHEN 1000 MG: 500 TABLET ORAL at 05:57

## 2025-04-26 ASSESSMENT — PAIN DESCRIPTION - LOCATION: LOCATION: KNEE

## 2025-04-26 ASSESSMENT — PAIN SCALES - GENERAL
PAINLEVEL_OUTOF10: 0
PAINLEVEL_OUTOF10: 0
PAINLEVEL_OUTOF10: 2
PAINLEVEL_OUTOF10: 5

## 2025-04-26 ASSESSMENT — PAIN DESCRIPTION - ORIENTATION: ORIENTATION: MID

## 2025-04-26 ASSESSMENT — PAIN DESCRIPTION - DESCRIPTORS: DESCRIPTORS: ACHING

## 2025-04-26 ASSESSMENT — PAIN - FUNCTIONAL ASSESSMENT
PAIN_FUNCTIONAL_ASSESSMENT: 0-10
PAIN_FUNCTIONAL_ASSESSMENT: PREVENTS OR INTERFERES SOME ACTIVE ACTIVITIES AND ADLS

## 2025-04-26 NOTE — H&P
significant to the case    SOCIAL HISTORY      reports that he has quit smoking. He has been exposed to tobacco smoke. He has never used smokeless tobacco. He reports that he does not drink alcohol and does not use drugs.  I have reviewed and agree with all Social.  There are no  concerns for substance abuse/use.    PHYSICAL EXAM     INITIAL VITALS:  oral temperature is 98.1 °F (36.7 °C). His blood pressure is 153/81 (abnormal) and his pulse is 74. His respiration is 19 and oxygen saturation is 95%.      CONSTITUTIONAL: No distress.  Patient appearing fairly well.  Ambulated from stretcher to bed.  Patient doing well per nursing   HEAD: normocephalic, atraumatic   EYES: PERRLA, EOMI    ENT: moist mucous membranes, uvula midline   NECK: supple, symmetric   BACK: symmetric   LUNGS: clear to auscultation bilaterally   CARDIOVASCULAR: regular rate and rhythm, no murmurs, rubs or gallops   ABDOMEN: soft, non-tender, non-distended with normal active bowel sounds   NEUROLOGIC:  MAEx4, no focal sensory or motor deficits   MUSCULOSKELETAL: no clubbing, cyanosis or edema   SKIN: no rash or wounds       DIFFERENTIAL DIAGNOSIS/MDM:     FROM ED MEDICAL DECISION MAKING NOTE:   Will ambulate patient and evaluate for orthostatic symptoms and hypoxia. [AS]    1256 Patient unable to ambulate more than 4 steps due to feeling shaky, patient concerned for falling.  Pulse ox at 93% during this event. Family medicine resident updated. [AS]   1300 Family medicine evaluated patient a few hours ago, does not believe patient requires admission.  Will admit to observation unit for PT OT.       DIAGNOSTIC RESULTS       RADIOLOGY:   I directly visualized the following  images and reviewed the radiologist interpretations:    CT CHEST PULMONARY EMBOLISM W CONTRAST  Result Date: 4/26/2025  EXAMINATION: CTA OF THE CHEST 4/26/2025 6:25 am TECHNIQUE: CTA of the chest was performed after the administration of intravenous contrast.  Multiplanar

## 2025-04-26 NOTE — CARE COORDINATION
Case Management Assessment  Initial Evaluation    Date/Time of Evaluation: 4/26/2025 4:49 PM  Assessment Completed by: Sneha Garcias RN    If patient is discharged prior to next notation, then this note serves as note for discharge by case management.    Patient Name: Mina Gill                   YOB: 1940  Diagnosis: Orthostatic hypotension [I95.1]  Hypoxemia [R09.02]  Dizziness [R42]  Weakness generalized [R53.1]                   Date / Time: 4/26/2025  5:01 AM    Patient Admission Status: Observation   Readmission Risk (Low < 19, Mod (19-27), High > 27): Readmission Risk Score: 15.9    Current PCP: Tisha Glasgow MD  PCP verified by CM? Yes    Chart Reviewed: Yes      History Provided by: Patient  Patient Orientation: Alert and Oriented    Patient Cognition: Alert    Hospitalization in the last 30 days (Readmission):  No    If yes, Readmission Assessment in CM Navigator will be completed.    Advance Directives:      Code Status: Full Code   Patient's Primary Decision Maker is: Named in Scanned ACP Document    Primary Decision Maker: Delvin Gill - Child - [phone]    Secondary Decision Maker: Gill,Ivone - Child - [phone]    Discharge Planning:    Patient lives with: Family Members Type of Home: House  Primary Care Giver: Self (lives with friends that help)  Patient Support Systems include: Friends/Neighbors, Family Members   Current Financial resources: Medicare  Current community resources: ECF/Home Care  Current services prior to admission: Durable Medical Equipment            Current DME: Walker, Cane            Type of Home Care services:  Skilled Therapy, Nursing Services    ADLS  Prior functional level: Cooking, Housework, Shopping  Current functional level: Cooking, Housework, Shopping, Mobility    PT AM-PAC: 17 /24  OT AM-PAC:   /24    Family can provide assistance at DC: No  Would you like Case Management to discuss the discharge plan with any other family members/significant

## 2025-04-26 NOTE — ED NOTES
Attempt to ambulate patient again, pt able to take a few steps with personal cane. Pt reports that he still feels too weak to walk further. Pt oxygen down to 93% during ambulation.   
Attempt to ambulate pt. Pt states that he feels \"too weak\" to attempt to walk. Pt given boxed lunch and will re-attempt to walk again   
Dr. Brown at bedside to evaluate pt  
Labeled urine specimen sent to lab via tube system.    Urine Sample  []  Clean catch  []  Straight cath  [x]  Urine voided  []  Indwelling catheter  []  Suprapubic catheter    
[employer] Pacemaker interrogated   
Neuro at bedside to evaluate pt   
Patient resting on stretcher, NAD  RR even and non-labored  Bed locked and in lowest position  Call light within reach  No needs expressed at this time   
Patient resting on stretcher, NAD  RR even and non-labored  Bed locked and in lowest position  Call light within reach  No needs expressed at this time   
Pt O2 89% on RA   Pt placed on 2L NC   O2 improved to 99%  
Pt brought to ED by Medic 9 for dizziness  Pt states that he has had increased dizziness and fatigue over the last week  Pt has hx of parkinsons  Pt denies having any chest pain or SOB, nausea or vomiting  Pt states he has headache and takes tylenol at home when needed  Breathing is non labored and no acute distress is noted.   Pt resting on stretcher, call light within reach.white board updated     
Pt to CT by vanessa   
Report given to Maya BERNARD   All questions answered   
The following labs were labeled with appropriate pt sticker and tubed to lab:     [] Blue     [] Lavender   [] on ice  [x] Green/yellow  [] Green/black [] on ice  [] Grey  [] on ice  [] Yellow  [] Red  [] Pink  [] Type/ Screen  [] ABG  [] VBG    [] COVID-19 swab    [] Rapid  [] PCR  [] Flu swab  [] Peds Viral Panel     [] Urine Sample  [] Fecal Sample  [] Pelvic Cultures  [] Blood Cultures  [] X 2  [] STREP Cultures  [] Wound Cultures    
([facility]) 3/18/2011    d/t being hurt on the job    CKD (chronic kidney disease) stage 2, GFR 60-89 ml/min     CKD (chronic kidney disease) stage 3, GFR 30-59 ml/min ([facility])     Diverticulosis of colon     GERD (gastroesophageal reflux disease)     History of non-ST elevation myocardial infarction (NSTEMI) 6/2022 10/27/2022    Impaired renal function     MRSA (methicillin resistant staph aureus) culture positive 9/23/2015    leg    Osteoarthritis of knee     Left    Parkinson disease ([facility])     Proteinuria     Skull fracture ([facility]) 3/18/2011    d/t 12-foot drop on the job    Temporary loss of eyesight     periodically, happened x7    Tubular adenoma of colon 2012, 2017    [facility]       Labs:  Labs Reviewed   CBC WITH AUTO DIFFERENTIAL - Abnormal; Notable for the following components:       Result Value    Immature Granulocytes % 1 (*)     All other components within normal limits   COMPREHENSIVE METABOLIC PANEL - Abnormal; Notable for the following components:    Glucose 153 (*)     Creatinine 1.3 (*)     Est, Glom Filt Rate 54 (*)     All other components within normal limits   TROPONIN - Abnormal; Notable for the following components:    Troponin, High Sensitivity 30 (*)     All other components within normal limits   TROPONIN - Abnormal; Notable for the following components:    Troponin, High Sensitivity 29 (*)     All other components within normal limits   URINALYSIS WITH REFLEX TO CULTURE       Electronically signed by VIN CHUN RN on 4/26/2025 at 1:32 PM    
Troponin, High Sensitivity 30 (*)     All other components within normal limits   TROPONIN - Abnormal; Notable for the following components:    Troponin, High Sensitivity 29 (*)     All other components within normal limits   URINALYSIS WITH REFLEX TO CULTURE       Electronically signed by VIN CHUN RN on 4/26/2025 at 9:20 AM

## 2025-04-26 NOTE — ED PROVIDER NOTES
[facility]     Emergency Department     Faculty Attestation    I performed a history and physical examination of the patient and discussed management with the resident. I reviewed the resident’s note and agree with the documented findings including all diagnostic interpretations and plan of care. Any areas of disagreement are noted on the chart. I was personally present for the key portions of any procedures. I have documented in the chart those procedures where I was not present during the key portions. I have reviewed the emergency nurses triage note. I agree with the chief complaint, past medical history, past surgical history, allergies, medications, social and family history as documented unless otherwise noted below. Documentation of the HPI, Physical Exam and Medical Decision Making performed by candidoibpaige is based on my personal performance of the HPI, PE and MDM. For Physician Assistant/ Nurse Practitioner cases/documentation I have personally evaluated this patient and have completed at least one if not all key elements of the E/M (history, physical exam, and MDM). Additional findings are as noted.    Primary Care Physician: Tisha Glasgow MD    Note Started: 6:34 AM EDT     VITAL SIGNS:   oral temperature is 98.1 °F (36.7 °C). His blood pressure is 122/81 and his pulse is 70. His respiration is 22 and oxygen saturation is 96%.      Medical Decision Making  Lightheadedness, orthostatic hypotension for EMS started on 1 L of fluids.  History of Parkinson's as well as prior syncope workup.  He is resting comfortably no distress cardiac regular rate and rhythm no murmurs rubs gallops pulmonary clear bilaterally.  Cardiac workup.  Neurology consultation as there may be some interactions with his parkinsonian meds    Amount and/or Complexity of Data Reviewed  External Data Reviewed: notes.  Labs: ordered. Decision-making details documented in ED 
     [facility] EMERGENCY DEPARTMENT  Emergency Department Encounter  Emergency Medicine Resident     Pt Name:Mina Gill  MRN: [id number]  Birthdate 1940  Date of evaluation: 4/26/25  PCP:  Tisha Glasgow MD  Note Started: 5:11 AM EDT      CHIEF COMPLAINT       Chief Complaint   Patient presents with    Dizziness       HISTORY OF PRESENT ILLNESS  (Location/Symptom, Timing/Onset, Context/Setting, Quality, Duration, Modifying Factors, Severity.)      Mina Gill is a 84 y.o. male who presents with weakness, dizziness.  Patient states that his symptoms have been going on for the past couple of days.  Patient has a history of Parkinson's syndrome and normally has a tremor at baseline.  Patient has a caregiver who comes and monitors his medications and gives them to him.  Patient unsure what medications he is currently taking.  Per EMS, they did orthostatic vitals and they were positive as patient had a drop in systolic by 37 mmHg.  EMS started a 1 L normal saline fluid bolus.  Patient is also complaining of a headache.  Patient states that he gets headaches intermittently and that this headache feels like his baseline headache for which he normally takes Tylenol.  Patient denies taking Tylenol today for the pain.  Patient denies chest pain, shortness of breath, abdominal pain, nausea, vomiting, cough, fevers.    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 ([facility]), CKD (chronic kidney disease) stage 2, GFR 60-89 ml/min, CKD (chronic kidney disease) stage 3, GFR 30-59 ml/min ([facility]), Diverticulosis of colon, GERD (gastroesophageal reflux disease), History of non-ST elevation myocardial infarction (NSTEMI) 6/2022, Impaired renal function, MRSA (methicillin resistant staph aureus) culture positive, Osteoarthritis of knee, Parkinson disease ([facility]), Proteinuria, Skull fracture ([facility]), Temporary loss of eyesight, and Tubular 
   FACULTY SIGN-OUT  ADDENDUM     Care of this patient was assumed from previous attending physician. The patient's initial evaluation and plan have been discussed with the prior provider who initially evaluated the patient.      Note Started: 7:13 AM EDT    Attestation  I was available and discussed any additional care issues that arose and coordinated the management plans with the resident(s) caring for the patient during my duty period. Any areas of disagreement with resident's documentation of care or procedures are noted on the chart. I was personally present for the key portions of any/all procedures, during my duty period. I have documented in the chart those procedures where I was not present during the key portions.       ED COURSE      The patient was given the following medications:  Orders Placed This Encounter   Medications    acetaminophen (TYLENOL) tablet 1,000 mg    iopamidol (ISOVUE-370) 76 % injection 75 mL    Compression Stockings MISC     Sig: by Does not apply route       RECENT VITALS:   Temp: 98.1 °F (36.7 °C), Pulse: 72, Respirations: 20, BP: 138/77    MEDICAL DECISION MAKING        Mina Gill is a 84 y.o. male who presents to the Emergency Department with complaints of dizziness.  CT scan a has been ordered on the patient as well as neurology consultation.  Patient has a history of Parkinson's disease.    Seen by neurology at [facility].  Attempted to ambulate the patient however up to 4 steps he was unable to any further as he was concerned that he may fall.  The nurse states he became quite wobbly and unsteady.  Will discuss with [facility] again for further disposition for admission for PT and OT.  [facility] defers to observation unit for admission      Chacorta Merlos MD, MD, F.A.C.E.P.  Attending Emergency Physician    (Please note that portions of this note were completed with a voice recognition program.  Efforts were made to edit the dictations 
VITALS:     Temp: 98.1 °F (36.7 °C),  Pulse: 70, Respirations: 11, BP: 138/77, SpO2: 94 %    This patient is a 84 y.o. Male with generalized weakness, dizziness.  Positive orthostatic hypotension.  127 systolic to 90 systolic.  Received 1 L.  Cardiac workup.  History of Parkinson's.  Is tremulous in the right upper extremity.  Neurology consulted for possible medication adjustment.  Intermittent hypoxia here.  Will need to be admitted for new oxygen requirement.  CT for PE pending.    ED Course as of 04/26/25 1329   Sat Apr 26, 2025   0532 Spoke with neurology, they will be down to evaluate patient. [SD]   0607 Notified by RN that patient is having intermittent episodes of hypoxia with oxygen saturations in the low 80s with good waveform.  Patient also states that he does not feel well during those episodes.  CT PE study.  It is listed that patient has an iodine allergy however on chart review patient has had multiple CT scans with IV contrast without pretreatment.  Will continue with CT PE study.  Patient currently on 2 L oxygen. [SD]   0615 Troponin, High Sensitivity(!): 30 [SD]   0615 Creatinine(!): 1.3  Around baseline [SD]   0710 Patient signed out to day resident.  Awaiting urinalysis and second troponin.  CT PE study also still pending as well as chest x-ray.  Patient will require hospital admission for new oxygen requirement and follow-up with neurology recommendations. [SD]   0711 Assumed care [AS]   0711 Comprehensive Metabolic Panel(!):    Sodium 136   Potassium 3.8   Chloride 100   CARBON DIOXIDE 25   Anion Gap 11   Glucose 153(!)   BUN,BUNPL 17   Creatinine 1.3(!)   Est, Glom Filt Rate 54(!)   Calcium 9.7   Total Protein 7.1   Albumin 4.1   Albumin/Globulin Ratio 1.4   Total Bilirubin 0.5   Alkaline Phosphatase 129   ALT 10   AST 24 [AS]   0712 CBC with Auto Differential(!):    WBC 6.1   RBC 4.53   Hemoglobin Quant 14.3   Hematocrit 42.3   MCV 93.4   MCH 31.6   MCHC 33.8   RDW 13.4   Platelet Count 178

## 2025-04-26 NOTE — CONSULTS
oriented; intact memory with no confusion, dysarthria present, no aphasia, no hallucinations or delusions      Cranial nerves    II - visual fields intact to confrontation                                                III, IV, VI - extra-ocular muscles full: no pupillary defect; no MAIRA, no nystagmus, no ptosis   V - normal facial sensation                                                               VII - mask face looked less prominent                                VIII - intact hearing                                                                             IX, X - symmetrical palate                                                                  XI - symmetrical shoulder shrug                                                       XII - midline tongue without atrophy or fasciculation      Motor function  Normal muscle bulk and tone  Muscle strength: normal power 5/5  fine motor movements     Resting pill rolling tremor noticed in b/l upper extremity (L > R).       Sensory function Intact to touch, pin prick, vibration, proprioception in bilateral upper and lower extremities.       Cerebellar Patient has decreasing amplitude and rapid alternating movements    Cogwheel rigidity      Reflex function Intact 2+ DTR and symmetric. Negative Babinski      Gait                  Is using a walker           Investigations:      Laboratory Testing:  Recent Results (from the past 24 hours)   CBC with Auto Differential    Collection Time: 04/26/25  5:32 AM   Result Value Ref Range    WBC 6.1 3.5 - 11.3 k/uL    RBC 4.53 4.21 - 5.77 m/uL    Hemoglobin 14.3 13.0 - 17.0 g/dL    Hematocrit 42.3 40.7 - 50.3 %    MCV 93.4 82.6 - 102.9 fL    MCH 31.6 25.2 - 33.5 pg    MCHC 33.8 28.4 - 34.8 g/dL    RDW 13.4 11.8 - 14.4 %    Platelets 178 138 - 453 k/uL    MPV 8.8 8.1 - 13.5 fL    NRBC Automated 0.0 0.0 per 100 WBC    Neutrophils % 59 36 - 65 %    Lymphocytes % 27 24 - 43 %    Monocytes % 8 3 - 12 %    Eosinophils % 4 1 - 4 %

## 2025-04-27 VITALS
BODY MASS INDEX: 28.72 KG/M2 | HEART RATE: 81 BPM | HEIGHT: 67 IN | RESPIRATION RATE: 16 BRPM | TEMPERATURE: 97.9 F | DIASTOLIC BLOOD PRESSURE: 96 MMHG | WEIGHT: 183 LBS | OXYGEN SATURATION: 95 % | SYSTOLIC BLOOD PRESSURE: 131 MMHG

## 2025-04-27 PROBLEM — I95.1 ORTHOSTATIC HYPOTENSION: Status: ACTIVE | Noted: 2025-04-27

## 2025-04-27 PROBLEM — R09.02 HYPOXEMIA: Status: ACTIVE | Noted: 2025-04-27

## 2025-04-27 LAB
EKG ATRIAL RATE: 84 BPM
EKG P AXIS: 42 DEGREES
EKG P-R INTERVAL: 206 MS
EKG Q-T INTERVAL: 424 MS
EKG QRS DURATION: 164 MS
EKG QTC CALCULATION (BAZETT): 510 MS
EKG R AXIS: -80 DEGREES
EKG T AXIS: 92 DEGREES
EKG VENTRICULAR RATE: 87 BPM

## 2025-04-27 PROCEDURE — 93010 ELECTROCARDIOGRAM REPORT: CPT | Performed by: INTERNAL MEDICINE

## 2025-04-27 PROCEDURE — 6370000000 HC RX 637 (ALT 250 FOR IP)

## 2025-04-27 PROCEDURE — 96361 HYDRATE IV INFUSION ADD-ON: CPT

## 2025-04-27 PROCEDURE — 6360000002 HC RX W HCPCS

## 2025-04-27 PROCEDURE — 96372 THER/PROPH/DIAG INJ SC/IM: CPT

## 2025-04-27 PROCEDURE — 99232 SBSQ HOSP IP/OBS MODERATE 35: CPT | Performed by: PSYCHIATRY & NEUROLOGY

## 2025-04-27 PROCEDURE — G0378 HOSPITAL OBSERVATION PER HR: HCPCS

## 2025-04-27 RX ORDER — FUROSEMIDE 20 MG/1
20 TABLET ORAL DAILY PRN
Qty: 30 TABLET | Refills: 0 | Status: ON HOLD | OUTPATIENT
Start: 2025-04-27 | End: 2025-05-27

## 2025-04-27 RX ORDER — BUTALBITAL, ACETAMINOPHEN AND CAFFEINE 50; 325; 40 MG/1; MG/1; MG/1
1 TABLET ORAL EVERY 4 HOURS PRN
Status: DISCONTINUED | OUTPATIENT
Start: 2025-04-27 | End: 2025-04-27 | Stop reason: HOSPADM

## 2025-04-27 RX ADMIN — ROPINIROLE HYDROCHLORIDE 0.5 MG: 0.5 TABLET, FILM COATED ORAL at 08:55

## 2025-04-27 RX ADMIN — CARBIDOPA, LEVODOPA AND ENTACAPONE 1 TABLET: 50; 200; 200 TABLET, FILM COATED ORAL at 09:08

## 2025-04-27 RX ADMIN — GABAPENTIN 100 MG: 100 CAPSULE ORAL at 08:55

## 2025-04-27 RX ADMIN — TAMSULOSIN HYDROCHLORIDE 0.4 MG: 0.4 CAPSULE ORAL at 08:55

## 2025-04-27 RX ADMIN — ATORVASTATIN CALCIUM 40 MG: 40 TABLET, FILM COATED ORAL at 08:55

## 2025-04-27 RX ADMIN — CLOPIDOGREL BISULFATE 75 MG: 75 TABLET, FILM COATED ORAL at 08:55

## 2025-04-27 RX ADMIN — FUROSEMIDE 20 MG: 20 TABLET ORAL at 08:55

## 2025-04-27 RX ADMIN — SUCRALFATE 1 G: 1 TABLET ORAL at 13:03

## 2025-04-27 RX ADMIN — ALLOPURINOL 100 MG: 100 TABLET ORAL at 09:07

## 2025-04-27 RX ADMIN — GABAPENTIN 100 MG: 100 CAPSULE ORAL at 14:29

## 2025-04-27 RX ADMIN — DICLOFENAC SODIUM 4 G: 10 GEL TOPICAL at 13:04

## 2025-04-27 RX ADMIN — ACETAMINOPHEN 650 MG: 325 TABLET ORAL at 05:05

## 2025-04-27 RX ADMIN — ENOXAPARIN SODIUM 40 MG: 100 INJECTION SUBCUTANEOUS at 08:54

## 2025-04-27 RX ADMIN — PANTOPRAZOLE SODIUM 20 MG: 20 TABLET, DELAYED RELEASE ORAL at 08:55

## 2025-04-27 RX ADMIN — Medication 400 MG: at 08:55

## 2025-04-27 RX ADMIN — CARBIDOPA, LEVODOPA AND ENTACAPONE 1 TABLET: 50; 200; 200 TABLET, FILM COATED ORAL at 14:28

## 2025-04-27 RX ADMIN — SUCRALFATE 1 G: 1 TABLET ORAL at 05:05

## 2025-04-27 RX ADMIN — ROPINIROLE HYDROCHLORIDE 0.5 MG: 0.5 TABLET, FILM COATED ORAL at 14:29

## 2025-04-27 ASSESSMENT — PAIN SCALES - GENERAL
PAINLEVEL_OUTOF10: 0
PAINLEVEL_OUTOF10: 5
PAINLEVEL_OUTOF10: 3

## 2025-04-27 ASSESSMENT — PAIN DESCRIPTION - DESCRIPTORS: DESCRIPTORS: ACHING

## 2025-04-27 ASSESSMENT — PAIN DESCRIPTION - ORIENTATION: ORIENTATION: LEFT

## 2025-04-27 ASSESSMENT — PAIN DESCRIPTION - LOCATION
LOCATION: HEAD
LOCATION: KNEE

## 2025-04-27 ASSESSMENT — PAIN - FUNCTIONAL ASSESSMENT: PAIN_FUNCTIONAL_ASSESSMENT: PREVENTS OR INTERFERES SOME ACTIVE ACTIVITIES AND ADLS

## 2025-04-27 ASSESSMENT — PAIN SCALES - WONG BAKER: WONGBAKER_NUMERICALRESPONSE: NO HURT

## 2025-04-27 NOTE — DISCHARGE INSTRUCTIONS
You were evaluated for your weakness and lightheadedness. You were found to have low blood pressure which has improved after fluids.     At this time, your Elavil (Amitriptyline) has been stopped.  Stop taking your Amlodipine (Norvasc) and Imdur (Isosorbide Mononitrate). This may have been contributing to your low blood pressure. Your Lasix (Furosemide) has been changed to take it daily as needed if you develop signs of fluid overload such as weight gain, leg swelling, or shortness of breath.     Please use compression socks and an abdominal abdominal binder to help with fluid return to the heart. Slowly change position from lying to sitting to standing, wait about 5 minutes before attempting to get up and walk. Stay well hydrated with water and electrolyte beverages. Avoid dehydrating beverages such as coffee, tea, and soda that have caffeine.    Follow up with cardiology for reevaluation.    Return to the emergency department if you develop any new or worsening symptoms such as chest pain, shortness of breath, weakness, numbness, speech abnormality, or other concerning symptoms.

## 2025-04-27 NOTE — PLAN OF CARE
Problem: Chronic Conditions and Co-morbidities  Goal: Patient's chronic conditions and co-morbidity symptoms are monitored and maintained or improved  4/27/2025 1604 by Graciela Lopez RN  Outcome: Adequate for Discharge     Problem: Discharge Planning  Goal: Discharge to home or other facility with appropriate resources  4/27/2025 1604 by Graciela Lopez RN  Outcome: Adequate for Discharge     Problem: Pain  Goal: Verbalizes/displays adequate comfort level or baseline comfort level  4/27/2025 1604 by Graciela Lopez RN  Outcome: Adequate for Discharge     Problem: Safety - Adult  Goal: Free from fall injury  4/27/2025 1604 by Graciela Lopez RN  Outcome: Adequate for Discharge

## 2025-04-27 NOTE — PROGRESS NOTES
[facility]  CDU / OBSERVATION ENCOUNTER  ATTENDING NOTE         I performed a history and physical examination of the patient and discussed management with the resident or midlevel provider. I reviewed the resident or midlevel provider's note and agree with the documented findings and plan of care. Any areas of disagreement are noted on the chart. I was personally present for the key portions of any procedures. I have documented in the chart those procedures where I was not present during the key portions. I have reviewed the nurses notes. I agree with the chief complaint, past medical history, past surgical history, allergies, medications, social and family history as documented unless otherwise noted below.    The Family history, social history, and ROS are effectively unchanged since admission unless noted elsewhere in the chart.     This patient was placed in the observation unit for reevaluation for possible admission to the hospital     Patient improved.  Patient ready for discharge.  Reviewed patient's blood pressure medications with nurse.  It was discovered on discharge that the patient had antihypertensives and his bubble pack that had been discontinued by the PCP and March.  Patient encouraged to remove those from bubble pack and to follow-up with PCP in an expedited manner to review medications and review of symptoms.  Patient had antihypertensives discontinued.       Frank Kirby MD  Attending Emergency  Physician    
Harpreet sheth placed on patients bilateral legs.   
[facility]   IN-PATIENT SERVICE   [facility]    Progress Note             Date:   4/27/2025  Patient name:  Mina Gill  Date of admission:  4/26/2025  5:01 AM  MRN:   [id number]  Account:  [id number]  YOB: 1940  PCP:    Tisha Glasgow MD  Room:   [address] [place] [address]  Code Status:    Full Code    Chief Complaint:     Chief Complaint   Patient presents with    Dizziness       Interval hx:     The patient was seen and examined at bedside. Is vitally stable, alert and oriented x 3. No acute events overnight.    Still has light headedness   BP better   Will dc elavil as that can cause lightheadedness  Compression stockings     Brief History of Present Illness:     Mina Gill is a 84-year-old male with a history of Parkinson's disease (diagnosed clinically), traumatic brain injury with prior subdural hematoma, CAD, CKD, and hypertension who presented to the ED with the chief complaint of feeling \"real weak.\" He also reports a headache, described as similar to his chronic post-traumatic headaches dating back to his TBI in 2011, with no change in character or severity. Neurology was consulted for evaluation.     On exam, the patient exhibits hallmark features of Parkinson’s disease, including bilateral resting (pill-rolling) tremor more prominent on the left, masked facies, generalized bradykinesia, and cogwheel rigidity. No new focal neurological deficits were identified. Orthostatic vitals are positive. He is currently being treated with IV fluids for suspected volume depletion.     The patient reports good compliance with his Parkinson’s medications, including carbidopa-levodopa-entacapone (STALEVO 200) TID and ropinirole 1 mg TID, consistent with his most recent outpatient neurology follow-up from February 2025. His tremors reportedly improve with sensory tricks, and he continues to require caregiver assistance with ADLs due to progressive stiffness but has had no significant 
Patient arrives to unit on stretcher with oxygen. Patient ambulates from stretcher in hallway to bed with cane and 1 assist. Patient's Sp02 at 97% on room air. Patient ambulates to bathroom and remains at 97% on room air.   
Patient, caregiver, and son given all discharge instructions and education. Everyone updated on patients blood pressure medications, amlodipine and isorsob mono, which were still in patients bubble pack. Also, updated that patients lasix dose had been changed to 20 mg from 40mg and was now to be given as needed and not routinely. Patient placed in wheel chair and taken to car.   
Spiritual Health History and Assessment/Progress Note  [facility]    Spiritual/Emotional Needs,  ,  ,      Name: Mina Gill MRN: [id number]    Age: 84 y.o.     Sex: male   Language: English   Catholic: Adventist   Weakness generalized     Date: 4/26/2025            Total Time Calculated: (P) 20 min              Spiritual Assessment began in [facility] EMERGENCY DEPARTMENT        Referral/Consult From: Rounding   Encounter Overview/Reason: Spiritual/Emotional Needs  Service Provided For: Patient    Melissa, Belief, Meaning:   Patient is connected with a melissa tradition or spiritual practice  Family/Friends No family/friends present      Importance and Influence:  Patient has no beliefs influential to healthcare decision-making identified during this visit  Family/Friends No family/friends present    Community:  Patient is connected with a spiritual community and feels well-supported. Support system includes: Other: Family  Family/Friends No family/friends present    Assessment and Plan of Care:     Patient Interventions include: Other:  provided a supportive presence through active listening and words of affirmation.  Family/Friends Interventions include: No family/friends present    Patient Plan of Care: Spiritual Care available upon further referral  Family/Friends Plan of Care: No family/friends present    Electronically signed by Chaplain Mahad on 4/26/2025 at 1:26 PM   
Spoke to pharmacy to discuss the patients home dose of Carbidopa-levodopa-entacapone -200. The hospital does not have that medication. Patients son provided patients home meds, however they are bubble packed. Pharmacy and Dr. Kirby gave the okay for staff to open bubble pack and give patient his home dose of medication. Writer spoke to patients son to verify same information of patient taking his home medication. 2 RN's verified correct medication was being given, as well as patient and family member.   
(EFFER-K) effervescent tablet 40 mEq, PRN   Or  potassium chloride 10 mEq/100 mL IVPB (Peripheral Line), PRN  magnesium sulfate 2000 mg in 50 mL IVPB premix, PRN  enoxaparin (LOVENOX) injection 40 mg, Daily  ondansetron (ZOFRAN-ODT) disintegrating tablet 4 mg, Q8H PRN   Or  ondansetron (ZOFRAN) injection 4 mg, Q6H PRN  polyethylene glycol (GLYCOLAX) packet 17 g, Daily PRN  acetaminophen (TYLENOL) tablet 650 mg, Q6H PRN   Or  acetaminophen (TYLENOL) suppository 650 mg, Q6H PRN  0.9 % sodium chloride infusion, Continuous        All medication charted and reviewed.    CONSULTS      IP CONSULT TO NEUROLOGY  IP CONSULT TO FAMILY MEDICINE    ASSESSMENT/PLAN       Mina Gill is a 84 y.o. male who presents with weakness and dizziness for several days prior to arrival.  This is likely due to orthostatic hypotension.  Patient was admitted to the observation service for further evaluation and treatment.    Hypotension resolved  We will plan to discontinue maintenance IVF and transition to p.o. hydration  PT/OT  Regular diet  Continue home medications and pain control  Monitor vitals, labs, and imaging    DISPO: pending clinical improvement    --  Suyapa Ruvalcaba,   Emergency Medicine Resident Physician     This dictation was generated by voice recognition computer software.  Although all attempts are made to edit the dictation for accuracy, there may be errors in the transcription that are not intended.    
training, Gait training, Functional mobility training, Transfer training, Patient/Caregiver education & training, Safety education & training, Therapeutic activities, Home exercise program, Stair training, Neuromuscular re-education    Goals  Short Term Goals  Time Frame for Short Term Goals: 14 visits  Short Term Goal 1: Sit to/from stand with supervision.  Short Term Goal 2: Ambulate 200' with walker with SBA  Short Term Goal 3: Negotiate up and down 4 steps with one railing with CGA.  Short Term Goal 4: Supine to/from sit with SBA.    Minutes  PT Individual Minutes  Time In: 1515  Time Out: 1540  Minutes: 25  Time Code Minutes  Timed Code Treatment Minutes: 10 Minutes    Electronically signed by Hillary Nuñez PT on 4/26/25 at 4:01 PM EDT

## 2025-04-27 NOTE — PLAN OF CARE
Problem: Chronic Conditions and Co-morbidities  Goal: Patient's chronic conditions and co-morbidity symptoms are monitored and maintained or improved  4/27/2025 0430 by Karen Burr RN  Outcome: Progressing  4/27/2025 0430 by Karen Burr RN  Outcome: Progressing     Problem: Discharge Planning  Goal: Discharge to home or other facility with appropriate resources  4/27/2025 0430 by Karen Burr RN  Outcome: Progressing  4/27/2025 0430 by Karen Burr RN  Outcome: Progressing     Problem: Pain  Goal: Verbalizes/displays adequate comfort level or baseline comfort level  4/27/2025 0430 by Karen Burr RN  Outcome: Progressing  4/27/2025 0430 by Karen Burr RN  Outcome: Progressing     Problem: Safety - Adult  Goal: Free from fall injury  4/27/2025 0430 by Karen Burr RN  Outcome: Progressing  4/27/2025 0430 by Karen Burr RN  Outcome: Progressing

## 2025-04-28 ENCOUNTER — TELEPHONE (OUTPATIENT)
Dept: FAMILY MEDICINE CLINIC | Age: 85
End: 2025-04-28

## 2025-04-28 NOTE — TELEPHONE ENCOUNTER
Care Transitions Initial Follow Up Call    Outreach made within 2 business days of discharge: Yes    Patient: Mina Gill Patient : 1940   MRN: [id number]  Reason for Admission: Weakness generalized  Discharge Date: 25       Spoke with: Unable to reach patient.    Discharge department/facility: [facility]    Follow   Future Appointments   Date Time Provider Department Center   2025  1:15 PM [facility]   2025  1:45 PM Leann Kolb DPM [facility]   2025  2:00 PM Felicia Gifford MD Neuro [facility] -       Antonio Bucio MA

## 2025-04-28 NOTE — CARE COORDINATION
Post- Discharge Phone Call    4/28/2025 @ 0942: JANUARY received phone call from Mavis, agent for [facility]. Mavis reports the patient is current with their services and is requesting an update. CM updated Mavis that patient was discharged 4/27/2025 and orders for home care were not provided. CM to fax over AVS and Mavis will follow up with patient's primary provider for home care orders. JANUARY faxed AVS to [phone].

## 2025-04-28 NOTE — DISCHARGE SUMMARY
CDU Discharge Summary        Patient:  Mina Gill  YOB: 1940    MRN: [id number]   Acct: [id number]    Primary Care Physician: Tisha Glasgow MD    Admit date:  4/26/2025  5:01 AM  Discharge date: 04/28/25      Discharge Diagnoses:     Patient had weakness and dizziness onset likely due to orthostatic hypotension.  Treated with medication adjustment, IV fluids.  Patient's symptoms are improved with the plan to discharge home with family.    Follow-up:  Follow up with PCP. Please return to the emergency room with any new or worsening symptoms.    Stressed to patient the importance of following up with primary care doctor for further workup/management of symptoms.  Pt verbalizes understanding and agrees with plan.    Discharge Medications:           Medication List        START taking these medications      Compression Stockings Misc  by Does not apply route            CHANGE how you take these medications      diclofenac sodium 1 % Gel  Commonly known as: VOLTAREN  Apply 4 g topically 4 times daily  What changed: Another medication with the same name was removed. Continue taking this medication, and follow the directions you see here.     furosemide 20 MG tablet  Commonly known as: LASIX  Take 1 tablet by mouth daily as needed (signs of fluid overload - weight gain, leg swelling, shortness of breath)  What changed:   medication strength  when to take this  reasons to take this     lidocaine 5 %  Commonly known as: Lidoderm  Place 1 patch onto the skin daily 12 hours on, 12 hours off.  What changed: Another medication with the same name was removed. Continue taking this medication, and follow the directions you see here.            CONTINUE taking these medications      albuterol sulfate  (90 Base) MCG/ACT inhaler  Commonly known as: Proventil HFA  Inhale 2 puffs into the lungs every 6 hours as needed for Wheezing     allopurinol 100 MG tablet  Commonly known as: ZYLOPRIM  TAKE ONE (1) TABLET

## 2025-04-28 NOTE — DISCHARGE INSTR - COC
EGD BIOPSY performed by Ross Garcia MD at [facility] ENDO       Immunization History:   Immunization History   Administered Date(s) Administered    COVID-19, PFIZER Bivalent, DO NOT Dilute, (age 12y+), IM, 30 mcg/0.3 mL 11/08/2022    COVID-19, PFIZER GRAY top, DO NOT Dilute, (age 12 y+), IM, 30 mcg/0.3 mL 06/24/2022    COVID-19, PFIZER PURPLE top, DILUTE for use, (age 12 y+), 30mcg/0.3mL 03/29/2021, 04/24/2021, 12/02/2021    Hep B, ENGERIX-B, (age 20y+), IM, 1mL 11/16/2023    Influenza 09/25/2012    Influenza Virus Vaccine 09/22/2014, 11/30/2015    Influenza, FLUAD, (age 65 y+), IM, Quadv, 0.5mL 10/19/2020    Influenza, FLUAD, (age 65 y+), IM, Trivalent PF, 0.5mL 08/23/2018, 10/06/2019    Influenza, FLUARIX, FLULAVAL, FLUZONE (age 6 mo+) and AFLURIA, (age 3 y+), Quadv PF, 0.5mL 11/16/2023    Influenza, FLUZONE High Dose (age 65 y+), IM, Quadv, 0.7mL 08/23/2018, 10/19/2020, 11/14/2022    Influenza, FLUZONE High Dose, (age 65 y+), IM, Trivalent PF, 0.5mL 09/22/2014, 12/05/2016, 08/07/2017, 12/19/2017    PPD Test 03/13/2023, 03/28/2023    Pneumococcal, PCV-13, PREVNAR 13, (age 6w+), IM, 0.5mL 06/14/2017, 05/18/2018, 05/18/2018, 06/19/2020    Pneumococcal, PPSV23, PNEUMOVAX 23, (age 2y+), SC/IM, 0.5mL 09/25/2012, 12/04/2015    TDaP, ADACEL (age 10y-64y), BOOSTRIX (age 10y+), IM, 0.5mL 06/06/2023, 06/06/2023, 11/16/2023    Tst, Unspecified Formulation 07/30/2022, 11/02/2022, 11/09/2022       Active Problems:  Patient Active Problem List   Diagnosis Code    Temporary loss of eyesight H53.129    Syncope : posterior circulation stroke vs Neurocardiogenic syncope  R55    Intracranial bleed : traumatic left sub-dural hematoma ,managed conservatively in 2011 I62.9    Headache R51.9    CKD (chronic kidney disease) stage 3, GFR 30-59 ml/min ([facility]) N18.30    Gastroesophageal reflux disease without esophagitis K21.9    Depression F32.A    Hyperlipidemia E78.5    Microhematuria R31.29    Microalbuminuria R80.9    Primary

## 2025-05-02 ENCOUNTER — HOSPITAL ENCOUNTER (INPATIENT)
Age: 85
LOS: 9 days | Discharge: NURSING, REHAB OR LONG-TERM CARE | DRG: 313 | End: 2025-05-12
Attending: EMERGENCY MEDICINE | Admitting: EMERGENCY MEDICINE
Payer: COMMERCIAL

## 2025-05-02 ENCOUNTER — APPOINTMENT (OUTPATIENT)
Dept: GENERAL RADIOLOGY | Age: 85
DRG: 313 | End: 2025-05-02
Payer: COMMERCIAL

## 2025-05-02 DIAGNOSIS — R07.9 CHEST PAIN, UNSPECIFIED TYPE: Primary | ICD-10-CM

## 2025-05-02 DIAGNOSIS — R53.1 GENERALIZED WEAKNESS: ICD-10-CM

## 2025-05-02 DIAGNOSIS — I20.9 ANGINA PECTORIS: ICD-10-CM

## 2025-05-02 LAB
ALBUMIN SERPL-MCNC: 3.9 G/DL (ref 3.5–5.2)
ALBUMIN/GLOB SERPL: 1.4 {RATIO} (ref 1–2.5)
ALP SERPL-CCNC: 128 U/L (ref 40–129)
ALT SERPL-CCNC: 23 U/L (ref 10–50)
ANION GAP SERPL CALCULATED.3IONS-SCNC: 10 MMOL/L (ref 9–16)
AST SERPL-CCNC: 27 U/L (ref 10–50)
BILIRUB SERPL-MCNC: 0.5 MG/DL (ref 0–1.2)
BILIRUB UR QL STRIP: NEGATIVE
BNP SERPL-MCNC: 281 PG/ML (ref 0–450)
BUN SERPL-MCNC: 15 MG/DL (ref 8–23)
CALCIUM SERPL-MCNC: 9.2 MG/DL (ref 8.6–10.4)
CHLORIDE SERPL-SCNC: 100 MMOL/L (ref 98–107)
CLARITY UR: CLEAR
CO2 SERPL-SCNC: 26 MMOL/L (ref 20–31)
COLOR UR: YELLOW
COMMENT: NORMAL
CREAT SERPL-MCNC: 1.3 MG/DL (ref 0.7–1.2)
ERYTHROCYTE [DISTWIDTH] IN BLOOD BY AUTOMATED COUNT: 14.1 % (ref 11.8–14.4)
FLUAV RNA RESP QL NAA+PROBE: NOT DETECTED
FLUBV RNA RESP QL NAA+PROBE: NOT DETECTED
GFR, ESTIMATED: 54 ML/MIN/1.73M2
GLUCOSE SERPL-MCNC: 177 MG/DL (ref 74–99)
GLUCOSE UR STRIP-MCNC: NEGATIVE MG/DL
HCT VFR BLD AUTO: 39.9 % (ref 40.7–50.3)
HGB BLD-MCNC: 13.8 G/DL (ref 13–17)
HGB UR QL STRIP.AUTO: NEGATIVE
KETONES UR STRIP-MCNC: NEGATIVE MG/DL
LEUKOCYTE ESTERASE UR QL STRIP: NEGATIVE
MCH RBC QN AUTO: 32.2 PG (ref 25.2–33.5)
MCHC RBC AUTO-ENTMCNC: 34.6 G/DL (ref 28.4–34.8)
MCV RBC AUTO: 93 FL (ref 82.6–102.9)
NITRITE UR QL STRIP: NEGATIVE
NRBC BLD-RTO: 0 PER 100 WBC
PH UR STRIP: 6.5 [PH] (ref 5–8)
PLATELET # BLD AUTO: 180 K/UL (ref 138–453)
PMV BLD AUTO: 9.6 FL (ref 8.1–13.5)
POTASSIUM SERPL-SCNC: 4.3 MMOL/L (ref 3.7–5.3)
PROT SERPL-MCNC: 6.7 G/DL (ref 6.6–8.7)
PROT UR STRIP-MCNC: NEGATIVE MG/DL
RBC # BLD AUTO: 4.29 M/UL (ref 4.21–5.77)
SARS-COV-2 RNA RESP QL NAA+PROBE: NOT DETECTED
SODIUM SERPL-SCNC: 136 MMOL/L (ref 136–145)
SOURCE: NORMAL
SP GR UR STRIP: 1.01 (ref 1–1.03)
SPECIMEN DESCRIPTION: NORMAL
TROPONIN I SERPL HS-MCNC: 23 NG/L (ref 0–22)
TROPONIN I SERPL HS-MCNC: 26 NG/L (ref 0–22)
UROBILINOGEN UR STRIP-ACNC: NORMAL EU/DL (ref 0–1)
WBC OTHER # BLD: 6 K/UL (ref 3.5–11.3)

## 2025-05-02 PROCEDURE — 80053 COMPREHEN METABOLIC PANEL: CPT

## 2025-05-02 PROCEDURE — 2500000003 HC RX 250 WO HCPCS

## 2025-05-02 PROCEDURE — G0378 HOSPITAL OBSERVATION PER HR: HCPCS

## 2025-05-02 PROCEDURE — 85027 COMPLETE CBC AUTOMATED: CPT

## 2025-05-02 PROCEDURE — 81003 URINALYSIS AUTO W/O SCOPE: CPT

## 2025-05-02 PROCEDURE — 93005 ELECTROCARDIOGRAM TRACING: CPT

## 2025-05-02 PROCEDURE — 83880 ASSAY OF NATRIURETIC PEPTIDE: CPT

## 2025-05-02 PROCEDURE — 84484 ASSAY OF TROPONIN QUANT: CPT

## 2025-05-02 PROCEDURE — 99285 EMERGENCY DEPT VISIT HI MDM: CPT

## 2025-05-02 PROCEDURE — 6370000000 HC RX 637 (ALT 250 FOR IP)

## 2025-05-02 PROCEDURE — 87636 SARSCOV2 & INF A&B AMP PRB: CPT

## 2025-05-02 PROCEDURE — 71045 X-RAY EXAM CHEST 1 VIEW: CPT

## 2025-05-02 RX ORDER — ENTACAPONE 200 MG/1
200 TABLET ORAL 3 TIMES DAILY
Status: DISCONTINUED | OUTPATIENT
Start: 2025-05-02 | End: 2025-05-12 | Stop reason: HOSPADM

## 2025-05-02 RX ORDER — ROPINIROLE 0.25 MG/1
0.5 TABLET, FILM COATED ORAL 3 TIMES DAILY
Status: DISCONTINUED | OUTPATIENT
Start: 2025-05-02 | End: 2025-05-12 | Stop reason: HOSPADM

## 2025-05-02 RX ORDER — SODIUM CHLORIDE 0.9 % (FLUSH) 0.9 %
5-40 SYRINGE (ML) INJECTION PRN
Status: DISCONTINUED | OUTPATIENT
Start: 2025-05-02 | End: 2025-05-12 | Stop reason: HOSPADM

## 2025-05-02 RX ORDER — PANTOPRAZOLE SODIUM 20 MG/1
20 TABLET, DELAYED RELEASE ORAL DAILY
Status: DISCONTINUED | OUTPATIENT
Start: 2025-05-03 | End: 2025-05-12 | Stop reason: HOSPADM

## 2025-05-02 RX ORDER — SODIUM CHLORIDE 0.9 % (FLUSH) 0.9 %
5-40 SYRINGE (ML) INJECTION EVERY 12 HOURS SCHEDULED
Status: DISCONTINUED | OUTPATIENT
Start: 2025-05-02 | End: 2025-05-12 | Stop reason: HOSPADM

## 2025-05-02 RX ORDER — ONDANSETRON 4 MG/1
4 TABLET, ORALLY DISINTEGRATING ORAL EVERY 8 HOURS PRN
Status: DISCONTINUED | OUTPATIENT
Start: 2025-05-02 | End: 2025-05-12 | Stop reason: HOSPADM

## 2025-05-02 RX ORDER — TAMSULOSIN HYDROCHLORIDE 0.4 MG/1
0.4 CAPSULE ORAL DAILY
Status: DISCONTINUED | OUTPATIENT
Start: 2025-05-03 | End: 2025-05-12 | Stop reason: HOSPADM

## 2025-05-02 RX ORDER — ENOXAPARIN SODIUM 100 MG/ML
40 INJECTION SUBCUTANEOUS DAILY
Status: DISCONTINUED | OUTPATIENT
Start: 2025-05-03 | End: 2025-05-12 | Stop reason: HOSPADM

## 2025-05-02 RX ORDER — CARBIDOPA AND LEVODOPA 25; 100 MG/1; MG/1
2 TABLET ORAL 3 TIMES DAILY
Status: DISCONTINUED | OUTPATIENT
Start: 2025-05-02 | End: 2025-05-12 | Stop reason: HOSPADM

## 2025-05-02 RX ORDER — ATORVASTATIN CALCIUM 40 MG/1
40 TABLET, FILM COATED ORAL DAILY
Status: DISCONTINUED | OUTPATIENT
Start: 2025-05-03 | End: 2025-05-12 | Stop reason: HOSPADM

## 2025-05-02 RX ORDER — CLOPIDOGREL BISULFATE 75 MG/1
75 TABLET ORAL DAILY
Status: DISCONTINUED | OUTPATIENT
Start: 2025-05-03 | End: 2025-05-12 | Stop reason: HOSPADM

## 2025-05-02 RX ORDER — ONDANSETRON 2 MG/ML
4 INJECTION INTRAMUSCULAR; INTRAVENOUS EVERY 6 HOURS PRN
Status: DISCONTINUED | OUTPATIENT
Start: 2025-05-02 | End: 2025-05-12 | Stop reason: HOSPADM

## 2025-05-02 RX ORDER — ACETAMINOPHEN 325 MG/1
650 TABLET ORAL EVERY 4 HOURS PRN
Status: DISCONTINUED | OUTPATIENT
Start: 2025-05-02 | End: 2025-05-12 | Stop reason: HOSPADM

## 2025-05-02 RX ORDER — LATANOPROST 50 UG/ML
1 SOLUTION/ DROPS OPHTHALMIC NIGHTLY
Status: DISCONTINUED | OUTPATIENT
Start: 2025-05-02 | End: 2025-05-12 | Stop reason: HOSPADM

## 2025-05-02 RX ORDER — GABAPENTIN 100 MG/1
100 CAPSULE ORAL 3 TIMES DAILY
Status: DISCONTINUED | OUTPATIENT
Start: 2025-05-02 | End: 2025-05-12 | Stop reason: HOSPADM

## 2025-05-02 RX ORDER — SODIUM CHLORIDE 9 MG/ML
INJECTION, SOLUTION INTRAVENOUS PRN
Status: DISCONTINUED | OUTPATIENT
Start: 2025-05-02 | End: 2025-05-12 | Stop reason: HOSPADM

## 2025-05-02 RX ORDER — CARBIDOPA, LEVODOPA AND ENTACAPONE 50; 200; 200 MG/1; MG/1; MG/1
1 TABLET, FILM COATED ORAL 3 TIMES DAILY
Status: DISCONTINUED | OUTPATIENT
Start: 2025-05-02 | End: 2025-05-02 | Stop reason: CLARIF

## 2025-05-02 RX ADMIN — ENTACAPONE 200 MG: 200 TABLET, FILM COATED ORAL at 21:39

## 2025-05-02 RX ADMIN — ROPINIROLE HYDROCHLORIDE 0.5 MG: 0.25 TABLET, FILM COATED ORAL at 21:39

## 2025-05-02 RX ADMIN — CARBIDOPA AND LEVODOPA 2 TABLET: 25; 100 TABLET ORAL at 21:39

## 2025-05-02 RX ADMIN — SODIUM CHLORIDE, PRESERVATIVE FREE 10 ML: 5 INJECTION INTRAVENOUS at 21:44

## 2025-05-02 RX ADMIN — LATANOPROST 1 DROP: 50 SOLUTION OPHTHALMIC at 21:39

## 2025-05-02 ASSESSMENT — PAIN - FUNCTIONAL ASSESSMENT: PAIN_FUNCTIONAL_ASSESSMENT: NONE - DENIES PAIN

## 2025-05-02 ASSESSMENT — HEART SCORE: ECG: NON-SPECIFC REPOLARIZATION DISTURBANCE/LBTB/PM

## 2025-05-02 ASSESSMENT — LIFESTYLE VARIABLES: HOW OFTEN DO YOU HAVE A DRINK CONTAINING ALCOHOL: NEVER

## 2025-05-02 NOTE — ED NOTES
Patient's family member arrives at bedside with patient's medications. Family administered bubble pack for the evening (patient normally takes at 5pm). Patient took 40mg atorvastation, carvidopa/levodopa, 0.5mg ropinirole, and sucralafate at this time. Family member states that she removes the furosemide, amlodipine, and isosorbide mononitrate before giving him the morning bubble pack pills.

## 2025-05-02 NOTE — ED NOTES
Patient presents to ED via EMS from home for evaluation of generalized fatigue and chest pain. Patient has had no chest pain for EMS. EMS administered 324mg aspirin. No nitro given. Patient only complains of generalized weakness and fatigue upon arrival.  Patient is alert and oriented x4, answering questions appropriately. Respirations even and unlabored. Patient changed into gown, placed on full cardiac monitor, BP cuff, and pulse ox. EKG completed. IV established. Call light within reach. Will continue to monitor.

## 2025-05-02 NOTE — ED NOTES
ED to inpatient nurses report      Chief Complaint:  Chief Complaint   Patient presents with    Chest Pain     Present to ED from: home    MOA:     LOC: alert and orientated to name, place, date  Mobility: Requires assistance * 1  Oxygen Baseline: room air    Current needs required: room air   Pending ED orders: none  Present condition: stable    Why did the patient come to the ED? Chest pain and fatigue  What is the plan? Cardiology consult  Any procedures or intervention occur? IV, labs, CXR  Any safety concerns?? none    Mental Status:  Level of Consciousness: Alert (0)    Psych Assessment:      Vital signs   Vitals:    05/02/25 1400 05/02/25 1415 05/02/25 1431 05/02/25 1526   BP: 137/67 134/77 117/71    Pulse: 71 70 70 70   Resp: 15 16 18 20   Temp:       TempSrc:       SpO2: 100% 98% 98% 96%   Weight:       Height:            Vitals:  Patient Vitals for the past 24 hrs:   BP Temp Temp src Pulse Resp SpO2 Height Weight   05/02/25 1526 -- -- -- 70 20 96 % -- --   05/02/25 1431 117/71 -- -- 70 18 98 % -- --   05/02/25 1415 134/77 -- -- 70 16 98 % -- --   05/02/25 1400 137/67 -- -- 71 15 100 % -- --   05/02/25 1345 112/68 -- -- 71 22 100 % -- --   05/02/25 1333 (!) 141/113 -- -- -- -- -- -- --   05/02/25 1332 -- 97.8 °F (36.6 °C) Oral 89 18 100 % -- --   05/02/25 1328 -- -- Oral -- -- -- 1.702 m (5' 7\") 81.6 kg (180 lb)      Visit Vitals  /71   Pulse 70   Temp 97.8 °F (36.6 °C) (Oral)   Resp 20   Ht 1.702 m (5' 7\")   Wt 81.6 kg (180 lb)   SpO2 96%   BMI 28.19 kg/m²        LDAs:   Peripheral IV 05/02/25 Left;Dorsal Forearm (Active)   Site Assessment Clean, dry & intact 05/02/25 1331   Line Status Blood return noted;Brisk blood return;Specimen collected;Flushed;Normal saline locked 05/02/25 1331   Phlebitis Assessment No symptoms 05/02/25 1331   Infiltration Assessment 0 05/02/25 1331   Dressing Status New dressing applied;Clean, dry & intact 05/02/25 1331   Dressing Type Transparent 05/02/25 1331

## 2025-05-02 NOTE — ED NOTES
Pt assisted to restroom in a wheel chair. Urine sample obtained, labeled, sent to lab for testing. Pt to pull call light when finished in restroom.

## 2025-05-02 NOTE — ED PROVIDER NOTES
[facility]  FACULTY HANDOFF       Handoff taken on the following patient from prior Attending Physician:  Pt Name: Minaivy Damicoerra  PCP:  Tisha Glasgow MD    Attestation  I was available and discussed any additional care issues that arose and coordinated the management plans with the resident(s) caring for the patient during my duty period. Any areas of disagreement with resident's documentation of care or procedures are noted on the chart. I was personally present for the key portions of any/all procedures during my duty period. I have documented in the chart those procedures where I was not present during the key portions.         CHIEF COMPLAINT       Chief Complaint   Patient presents with    Chest Pain         CURRENT MEDICATIONS     Previous Medications  Previous Medications    ALBUTEROL SULFATE HFA (PROVENTIL HFA) 108 (90 BASE) MCG/ACT INHALER    Inhale 2 puffs into the lungs every 6 hours as needed for Wheezing    ALLOPURINOL (ZYLOPRIM) 100 MG TABLET    TAKE ONE (1) TABLET BY MOUTH ONCE DAILY FOR GOUT    ATORVASTATIN (LIPITOR) 40 MG TABLET    TAKE ONE (1) TABLET BY MOUTH ONCE DAILY    CARBIDOPA-LEVODOPA-ENTACAPONE (STALEVO 200) -200 MG TABS PER TABLET    TAKE ONE (1) TABLET BY MOUTH THREE (3) TIMES DAILY    CLOPIDOGREL (PLAVIX) 75 MG TABLET    TAKE 1 TABLET BY MOUTH ONCE DAILY -FOR ANTICOAGULANT    COMPRESSION STOCKINGS MISC    by Does not apply route    DICLOFENAC SODIUM (VOLTAREN) 1 % GEL    Apply 4 g topically 4 times daily    FLUTICASONE (FLONASE) 50 MCG/ACT NASAL SPRAY    2 sprays by Each Nostril route daily    FUROSEMIDE (LASIX) 20 MG TABLET    Take 1 tablet by mouth daily as needed (signs of fluid overload - weight gain, leg swelling, shortness of breath)    GABAPENTIN (NEURONTIN) 100 MG CAPSULE    TAKE ONE (1) CAPSULE BY MOUTH DAILY FOR 60 DAYS.    LATANOPROST (XALATAN) 0.005 % OPHTHALMIC SOLUTION    INSTILL ONE (1) DROP IN BOTH EYES EVERY NIGHT AT BEDTIME    LIDOCAINE

## 2025-05-02 NOTE — ED PROVIDER NOTES
[facility]  Emergency Department  Faculty Attestation     I performed a history and physical examination of the patient and discussed management with the resident. I reviewed the resident’s note and agree with the documented findings and plan of care. Any areas of disagreement are noted on the chart. I was personally present for the key portions of any procedures. I have documented in the chart those procedures where I was not present during the key portions. I have reviewed the emergency nurses triage note. I agree with the chief complaint, past medical history, past surgical history, allergies, medications, social and family history as documented unless otherwise noted below.    For Physician Assistant/ Nurse Practitioner cases/documentation I have personally evaluated this patient and have completed at least one if not all key elements of the E/M (history, physical exam, and MDM). Additional findings are as noted.    Preliminary note started at 1:32 PM EDT    Primary Care Physician:  Tisha Glasgow MD    Screenings:  [unfilled]    CHIEF COMPLAINT       Chief Complaint   Patient presents with    Chest Pain       RECENT VITALS:   Ht 1.702 m (5' 7\")   Wt 81.6 kg (180 lb)   BMI 28.19 kg/m²     LABS:  Labs Reviewed - No data to display    Radiology  No orders to display       CRITICAL CARE: There was a high probability of clinically significant/life threatening deterioration in this patient's condition which required my urgent intervention.  Total critical care time was none minutes.  This excludes any time for separately reportable procedures.     EKG:  EKG Interpretation    Interpreted by me    Rhythm: Paced  Rate: normal  Axis: Leftward  Ectopy: none  Conduction: Left bundle branch block pattern  ST Segments: Discordant changes but negative Branham criteria  T Waves: Discordant inversions  Q Waves: Inferior and septal    Clinical Impression: Paced rhythm with bundle

## 2025-05-02 NOTE — ED PROVIDER NOTES
[facility] ED  Emergency Department Encounter  Emergency Medicine Resident     Pt Name:Mina Gill  MRN: [id number]  Birthdate 1940  Date of evaluation: 5/2/25  PCP:  Tisha Glasgow MD  Note Started: 1:35 PM EDT      CHIEF COMPLAINT       Chief Complaint   Patient presents with    Chest Pain       HISTORY OF PRESENT ILLNESS  (Location/Symptom, Timing/Onset, Context/Setting, Quality, Duration, Modifying Factors, Severity.)      Mina Gill is a 84 y.o. male who presents with chest pain and generalized fatigue.  He states that it started this morning.  He does live with a friend/roommate.  He denies falls, denies focal weakness, denies shortness of breath, denies cough, denies lower extremity swelling, denies abdominal pain, denies nausea or vomiting, denies diarrhea.  He does have a significant medical history including history of CKD, previous MI, Parkinson's disease [on Stalevo], status post AICD placement 1 year ago for complete heart block.  Does also have a history of recent UTI, did grow Morganella Morgagni.  There was also concern for dysphagia, negative modified barium swallow study on 3/20.    Of note, patient was recently discharged after being admitted under observation status for orthostatic hypotension and generalized fatigue.  His amitriptyline was stopped at that time.    Patient is a family medicine patient.    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 ([facility]), CKD (chronic kidney disease) stage 2, GFR 60-89 ml/min, CKD (chronic kidney disease) stage 3, GFR 30-59 ml/min ([facility]), Diverticulosis of colon, GERD (gastroesophageal reflux disease), History of non-ST elevation myocardial infarction (NSTEMI) 6/2022, Impaired renal function, MRSA (methicillin resistant staph aureus) culture positive, Osteoarthritis of knee, Parkinson disease ([facility]), Proteinuria, Skull fracture ([facility]), Temporary loss  Positive for chest pain. Negative for palpitations and leg swelling.   Gastrointestinal:  Negative for abdominal pain, diarrhea, nausea and vomiting.   Genitourinary:  Negative for difficulty urinating.   Skin:  Negative for wound.   Neurological:  Negative for weakness, numbness and headaches.     PHYSICAL EXAM      INITIAL VITALS:   /76   Pulse 71   Temp 97.9 °F (36.6 °C) (Oral)   Resp 18   Ht 1.702 m (5' 7\")   Wt 81.6 kg (180 lb)   SpO2 97%   BMI 28.19 kg/m²     Physical Exam  Vitals reviewed.   Constitutional:       General: He is not in acute distress.     Appearance: He is not toxic-appearing.   HENT:      Head: Normocephalic and atraumatic.      Mouth/Throat:      Mouth: Mucous membranes are moist.   Cardiovascular:      Rate and Rhythm: Normal rate and regular rhythm.      Pulses:           Radial pulses are 2+ on the right side and 2+ on the left side.   Pulmonary:      Effort: Pulmonary effort is normal. No respiratory distress.      Breath sounds: No stridor. No wheezing or rhonchi.   Chest:      Chest wall: No tenderness.       Abdominal:      Palpations: Abdomen is soft.      Tenderness: There is no abdominal tenderness. There is no guarding.   Musculoskeletal:      Right lower leg: No edema.      Left lower leg: No edema.   Skin:     General: Skin is warm and dry.   Neurological:      Mental Status: He is alert and oriented to person, place, and time.      GCS: GCS eye subscore is 4. GCS verbal subscore is 5. GCS motor subscore is 6.      Motor: Tremor (Pill-rolling) present.      Comments: 5/5 hip flexion bilaterally  5/5 shoulder abduction and abduction bilaterally       DDX/DIAGNOSTIC RESULTS / EMERGENCY DEPARTMENT COURSE / MDM     Medical Decision Making  84-year-old male with multiple admissions for generalized weakness but with a significant medical history including history of UTI, complete AV block, previous MI presenting with chest pain and fatigue.  Will obtain cardiac workup,

## 2025-05-03 ENCOUNTER — APPOINTMENT (OUTPATIENT)
Dept: NUCLEAR MEDICINE | Age: 85
DRG: 313 | End: 2025-05-03
Payer: COMMERCIAL

## 2025-05-03 ENCOUNTER — HOSPITAL ENCOUNTER (OUTPATIENT)
Age: 85
Setting detail: OBSERVATION
Discharge: HOME | End: 2025-05-05
Payer: COMMERCIAL

## 2025-05-03 VITALS — SYSTOLIC BLOOD PRESSURE: 121 MMHG | RESPIRATION RATE: 16 BRPM | DIASTOLIC BLOOD PRESSURE: 68 MMHG | HEART RATE: 71 BPM

## 2025-05-03 LAB
25(OH)D3 SERPL-MCNC: 28.4 NG/ML (ref 30–100)
ECHO BSA: 1.96 M2
EKG ATRIAL RATE: 79 BPM
EKG Q-T INTERVAL: 428 MS
EKG QRS DURATION: 160 MS
EKG QTC CALCULATION (BAZETT): 490 MS
EKG R AXIS: -78 DEGREES
EKG T AXIS: 94 DEGREES
EKG VENTRICULAR RATE: 79 BPM
FOLATE SERPL-MCNC: 17.1 NG/ML (ref 4.8–24.2)
STRESS BASELINE DIAS BP: 68 MMHG
STRESS BASELINE HR: 69 BPM
STRESS BASELINE SYS BP: 121 MMHG
STRESS ESTIMATED WORKLOAD: 1 METS
STRESS PEAK DIAS BP: 68 MMHG
STRESS PEAK SYS BP: 121 MMHG
STRESS PERCENT HR ACHIEVED: 67 %
STRESS POST PEAK HR: 91 BPM
STRESS RATE PRESSURE PRODUCT: NORMAL BPM*MMHG
STRESS ST DEPRESSION: 0 MM
STRESS TARGET HR: 135 BPM
T4 FREE SERPL-MCNC: 1 NG/DL (ref 0.92–1.68)
TSH SERPL DL<=0.05 MIU/L-ACNC: 6 UIU/ML (ref 0.27–4.2)
VIT B12 SERPL-MCNC: 1698 PG/ML (ref 232–1245)

## 2025-05-03 PROCEDURE — 6360000002 HC RX W HCPCS

## 2025-05-03 PROCEDURE — 6370000000 HC RX 637 (ALT 250 FOR IP)

## 2025-05-03 PROCEDURE — 82607 VITAMIN B-12: CPT

## 2025-05-03 PROCEDURE — 2500000003 HC RX 250 WO HCPCS

## 2025-05-03 PROCEDURE — 99222 1ST HOSP IP/OBS MODERATE 55: CPT | Performed by: INTERNAL MEDICINE

## 2025-05-03 PROCEDURE — 36415 COLL VENOUS BLD VENIPUNCTURE: CPT

## 2025-05-03 PROCEDURE — 96372 THER/PROPH/DIAG INJ SC/IM: CPT

## 2025-05-03 PROCEDURE — A9500 TC99M SESTAMIBI: HCPCS

## 2025-05-03 PROCEDURE — 82306 VITAMIN D 25 HYDROXY: CPT

## 2025-05-03 PROCEDURE — 82746 ASSAY OF FOLIC ACID SERUM: CPT

## 2025-05-03 PROCEDURE — 84439 ASSAY OF FREE THYROXINE: CPT

## 2025-05-03 PROCEDURE — 1200000000 HC SEMI PRIVATE

## 2025-05-03 PROCEDURE — 3430000000 HC RX DIAGNOSTIC RADIOPHARMACEUTICAL

## 2025-05-03 PROCEDURE — 84443 ASSAY THYROID STIM HORMONE: CPT

## 2025-05-03 PROCEDURE — 93017 CV STRESS TEST TRACING ONLY: CPT

## 2025-05-03 PROCEDURE — 93010 ELECTROCARDIOGRAM REPORT: CPT | Performed by: INTERNAL MEDICINE

## 2025-05-03 PROCEDURE — 78452 HT MUSCLE IMAGE SPECT MULT: CPT

## 2025-05-03 RX ORDER — SODIUM CHLORIDE 0.9 % (FLUSH) 0.9 %
10 SYRINGE (ML) INJECTION PRN
Status: DISCONTINUED | OUTPATIENT
Start: 2025-05-03 | End: 2025-05-12 | Stop reason: HOSPADM

## 2025-05-03 RX ORDER — TETRAKIS(2-METHOXYISOBUTYLISOCYANIDE)COPPER(I) TETRAFLUOROBORATE 1 MG/ML
38 INJECTION, POWDER, LYOPHILIZED, FOR SOLUTION INTRAVENOUS
Status: COMPLETED | OUTPATIENT
Start: 2025-05-03 | End: 2025-05-03

## 2025-05-03 RX ORDER — METOPROLOL TARTRATE 1 MG/ML
5 INJECTION, SOLUTION INTRAVENOUS EVERY 5 MIN PRN
Status: DISCONTINUED | OUTPATIENT
Start: 2025-05-03 | End: 2025-05-03

## 2025-05-03 RX ORDER — MIDODRINE HYDROCHLORIDE 5 MG/1
5 TABLET ORAL
Status: DISCONTINUED | OUTPATIENT
Start: 2025-05-04 | End: 2025-05-12 | Stop reason: HOSPADM

## 2025-05-03 RX ORDER — REGADENOSON 0.08 MG/ML
0.4 INJECTION, SOLUTION INTRAVENOUS
Status: COMPLETED | OUTPATIENT
Start: 2025-05-03 | End: 2025-05-03

## 2025-05-03 RX ORDER — TETRAKIS(2-METHOXYISOBUTYLISOCYANIDE)COPPER(I) TETRAFLUOROBORATE 1 MG/ML
13 INJECTION, POWDER, LYOPHILIZED, FOR SOLUTION INTRAVENOUS
Status: COMPLETED | OUTPATIENT
Start: 2025-05-03 | End: 2025-05-03

## 2025-05-03 RX ORDER — SODIUM CHLORIDE 9 MG/ML
500 INJECTION, SOLUTION INTRAVENOUS CONTINUOUS PRN
Status: DISCONTINUED | OUTPATIENT
Start: 2025-05-03 | End: 2025-05-03

## 2025-05-03 RX ORDER — AMINOPHYLLINE 25 MG/ML
50 INJECTION, SOLUTION INTRAVENOUS PRN
Status: DISCONTINUED | OUTPATIENT
Start: 2025-05-03 | End: 2025-05-03

## 2025-05-03 RX ORDER — ALBUTEROL SULFATE 90 UG/1
2 INHALANT RESPIRATORY (INHALATION) PRN
Status: DISCONTINUED | OUTPATIENT
Start: 2025-05-03 | End: 2025-05-03

## 2025-05-03 RX ORDER — NITROGLYCERIN 0.4 MG/1
0.4 TABLET SUBLINGUAL EVERY 5 MIN PRN
Status: DISCONTINUED | OUTPATIENT
Start: 2025-05-03 | End: 2025-05-03

## 2025-05-03 RX ORDER — ATROPINE SULFATE 0.1 MG/ML
0.5 INJECTION INTRAVENOUS EVERY 5 MIN PRN
Status: DISCONTINUED | OUTPATIENT
Start: 2025-05-03 | End: 2025-05-03

## 2025-05-03 RX ORDER — SODIUM CHLORIDE 0.9 % (FLUSH) 0.9 %
5-40 SYRINGE (ML) INJECTION PRN
Status: DISCONTINUED | OUTPATIENT
Start: 2025-05-03 | End: 2025-05-03

## 2025-05-03 RX ADMIN — ROPINIROLE HYDROCHLORIDE 0.5 MG: 0.25 TABLET, FILM COATED ORAL at 08:40

## 2025-05-03 RX ADMIN — CLOPIDOGREL BISULFATE 75 MG: 75 TABLET, FILM COATED ORAL at 08:41

## 2025-05-03 RX ADMIN — REGADENOSON 0.4 MG: 0.08 INJECTION, SOLUTION INTRAVENOUS at 11:40

## 2025-05-03 RX ADMIN — ENTACAPONE 200 MG: 200 TABLET, FILM COATED ORAL at 21:21

## 2025-05-03 RX ADMIN — ENOXAPARIN SODIUM 40 MG: 100 INJECTION SUBCUTANEOUS at 08:43

## 2025-05-03 RX ADMIN — ROPINIROLE HYDROCHLORIDE 0.5 MG: 0.25 TABLET, FILM COATED ORAL at 21:21

## 2025-05-03 RX ADMIN — LATANOPROST 1 DROP: 50 SOLUTION OPHTHALMIC at 21:21

## 2025-05-03 RX ADMIN — ENTACAPONE 200 MG: 200 TABLET, FILM COATED ORAL at 15:10

## 2025-05-03 RX ADMIN — CARBIDOPA AND LEVODOPA 2 TABLET: 25; 100 TABLET ORAL at 08:40

## 2025-05-03 RX ADMIN — TETRAKIS(2-METHOXYISOBUTYLISOCYANIDE)COPPER(I) TETRAFLUOROBORATE 38 MILLICURIE: 1 INJECTION, POWDER, LYOPHILIZED, FOR SOLUTION INTRAVENOUS at 12:39

## 2025-05-03 RX ADMIN — CARBIDOPA AND LEVODOPA 2 TABLET: 25; 100 TABLET ORAL at 21:21

## 2025-05-03 RX ADMIN — SODIUM CHLORIDE, PRESERVATIVE FREE 10 ML: 5 INJECTION INTRAVENOUS at 11:30

## 2025-05-03 RX ADMIN — CARBIDOPA AND LEVODOPA 2 TABLET: 25; 100 TABLET ORAL at 15:10

## 2025-05-03 RX ADMIN — ROPINIROLE HYDROCHLORIDE 0.5 MG: 0.25 TABLET, FILM COATED ORAL at 15:10

## 2025-05-03 RX ADMIN — TAMSULOSIN HYDROCHLORIDE 0.4 MG: 0.4 CAPSULE ORAL at 08:40

## 2025-05-03 RX ADMIN — PANTOPRAZOLE SODIUM 20 MG: 20 TABLET, DELAYED RELEASE ORAL at 08:40

## 2025-05-03 RX ADMIN — ENTACAPONE 200 MG: 200 TABLET, FILM COATED ORAL at 08:40

## 2025-05-03 RX ADMIN — SODIUM CHLORIDE, PRESERVATIVE FREE 10 ML: 5 INJECTION INTRAVENOUS at 08:40

## 2025-05-03 RX ADMIN — TETRAKIS(2-METHOXYISOBUTYLISOCYANIDE)COPPER(I) TETRAFLUOROBORATE 13 MILLICURIE: 1 INJECTION, POWDER, LYOPHILIZED, FOR SOLUTION INTRAVENOUS at 12:39

## 2025-05-03 RX ADMIN — SODIUM CHLORIDE, PRESERVATIVE FREE 10 ML: 5 INJECTION INTRAVENOUS at 21:21

## 2025-05-03 RX ADMIN — ATORVASTATIN CALCIUM 40 MG: 40 TABLET, FILM COATED ORAL at 08:40

## 2025-05-03 ASSESSMENT — PAIN SCALES - GENERAL: PAINLEVEL_OUTOF10: 0

## 2025-05-03 NOTE — CONSULTS
[facility]   Consult Note         Today's Date: 5/3/2025  Patient Name: Mina Gill  Date of admission: 5/2/2025  1:28 PM  Patient's age: 84 y.o., 1940  Admission Dx: Chest pain [R07.9]    Reason for Consult:  Cardiac evaluation    Requesting Physician: Frank Kirby MD    REASON FOR CONSULT: Chest pain, generalized weakness    History Obtained From:  Patient, chart, staff, records    HISTORY OF PRESENT ILLNESS:      Patient is a 84-year-old male with a past medical history of complete heart block s/p Medtronic Cougar XT DR pacemaker on 8/7/2024, heart failure with preserved ejection fraction, CAD s/p PCI JENN to LAD in 2018, nonocclusive CAD on cath 4/24, CKD stage III, hypertension, type 2 diabetes mellitus, Parkinson's disease presented to the ER with the chief complaint of chest pain and fatigue.  Patient states it has been going on for a while.  Patient lives with a roommate.  Patient denies any fall, global weakness, denies shortness of breath, cough, congestion.    Past Medical History:    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 ([facility]) 3/18/2011    d/t being hurt on the job    CKD (chronic kidney disease) stage 2, GFR 60-89 ml/min     CKD (chronic kidney disease) stage 3, GFR 30-59 ml/min ([facility])     Diverticulosis of colon     GERD (gastroesophageal reflux disease)     History of non-ST elevation myocardial infarction (NSTEMI) 6/2022 10/27/2022    Impaired renal function     MRSA (methicillin resistant staph aureus) culture positive 9/23/2015    leg    Osteoarthritis of knee     Left    Parkinson disease ([facility])     Proteinuria     Skull fracture ([facility]) 3/18/2011    d/t 12-foot drop on the job    Temporary loss of eyesight     periodically, happened x7    Tubular adenoma of colon 2012, 2017    isaiah       Past Surgical History:   has a past surgical history that includes Colonoscopy (11/02/2012); shoulder surgery (Right); pr colsc flx  diplopia. No scleral icterus.  ENT: No Headaches, hearing loss or vertigo. No mouth sores or sore throat.  Cardiovascular: per HPI  Respiratory: per HPI  Gastrointestinal: No abdominal pain, appetite loss, blood in stools. No change in bowel or bladder habits.  Genitourinary: No dysuria, trouble voiding, or hematuria.  Musculoskeletal:  No gait disturbance, No weakness or joint complaints.  Integumentary: No rash or pruritis.  Neurological: No headache, diplopia, change in muscle strength, numbness or tingling. No change in gait, balance, coordination, mood, affect, memory, mentation, behavior.  Psychiatric: No anxiety, or depression.  Endocrine: No temperature intolerance. No excessive thirst, fluid intake, or urination. No tremor.  Hematologic/Lymphatic: No abnormal bruising or bleeding, blood clots or swollen lymph nodes.  Allergic/Immunologic: No nasal congestion or hives.      PHYSICAL EXAM:      /76   Pulse 71   Temp 97.9 °F (36.6 °C) (Oral)   Resp 18   Ht 1.702 m (5' 7\")   Wt 81.6 kg (180 lb)   SpO2 97%   BMI 28.19 kg/m²    General appearance: alert and cooperative with exam  HEENT: Head: Normocephalic, no lesions, without obvious abnormality.  Eyes: PERRL, EOMI  Ears: Not obvious deformations or lack of hearing  Neck: no carotid bruit, no JVD  Lungs: clear to auscultation bilaterally  Heart: regular rate and rhythm, S1, S2 normal, no murmur, click, rub or gallop  Abdomen: soft, non-tender; bowel sounds normal; no masses,  no organomegaly  Extremities: extremities normal, atraumatic, no cyanosis or edema  Neurologic: Mental status: Alert, oriented, thought content appropriate, pill-rolling tremors  Skin: WNL for age and condition, no obvious rashes or leasions        Other active acute problems:  Patient Active Problem List   Diagnosis    Temporary loss of eyesight    Syncope : posterior circulation stroke vs Neurocardiogenic syncope     Intracranial bleed : traumatic left sub-dural hematoma

## 2025-05-03 NOTE — PROGRESS NOTES
[facility]  CDU / OBSERVATION ENCOUNTER  ATTENDING NOTE         I performed a history and physical examination of the patient and discussed management with the resident or midlevel provider. I reviewed the resident or midlevel provider's note and agree with the documented findings and plan of care. Any areas of disagreement are noted on the chart. I was personally present for the key portions of any procedures. I have documented in the chart those procedures where I was not present during the key portions. I have reviewed the nurses notes. I agree with the chief complaint, past medical history, past surgical history, allergies, medications, social and family history as documented unless otherwise noted below.    The Family history, social history, and ROS are effectively unchanged since admission unless noted elsewhere in the chart.     This patient was placed in the observation unit for reevaluation for possible admission to the hospital     Patient with probable autonomic instability.  Orthostatic positive.  Patient admitted after evaluation previously for same.  Patient with tremor and difficulty with control of tremor secondary to Parkinson's.    Failure by cardiology.  Stress testing ordered.  Will also need neurologic evaluation secondary to debilitating tremor and autonomic issues possibly medication related to the Parkinson's       Frank Kirby MD  Attending Emergency  Physician

## 2025-05-03 NOTE — CONSULTS
[facility]   IN-PATIENT SERVICE   [facility]    NEUROLOGY CONSULT NOTE            Date:   5/3/2025  Patient name:  Mina Gill  Date of admission:  5/2/2025  1:28 PM  MRN:   [id number]  Account:  [id number]  YOB: 1940  PCP:    Tisha Glasgow MD  Room:   OBS 29/29-1  Code Status:    Full Code    Chief Complaint:     Chief Complaint   Patient presents with    Chest Pain     History Obtained From:     patient, electronic medical record    History of Present Illness:     Mina Gill is a 84-year-old male with a history of Parkinson's disease (diagnosed clinically), 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 He also reports a headache, described as similar to his chronic post-traumatic headaches dating back to his TBI in 2011, with no change in character or severity. Neurology was consulted for evaluation.    The patient reports good compliance with his Parkinson’s medications, including carbidopa-levodopa-entacapone (STALEVO 200) TID and ropinirole 1 mg TID, consistent with his most recent outpatient neurology follow-up from February 2025. His tremors reportedly improve with sensory tricks, and he continues to require caregiver assistance with ADLs due to progressive stiffness but has had no significant cognitive decline.     On exam, the patient exhibits hallmark features of Parkinson’s disease, including bilateral resting (pill-rolling) tremor more prominent on the left, masked facies, generalized bradykinesia, and cogwheel rigidity. No new focal neurological deficits were identified. Strength 5/5 throughout.        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) 3/18/2011    d/t being hurt on the job    CKD (chronic kidney disease) stage 2, GFR 60-89 ml/min     CKD

## 2025-05-03 NOTE — PLAN OF CARE
Problem: Discharge Planning  Goal: Discharge to home or other facility with appropriate resources  5/3/2025 1839 by Becca Galaviz RN  Outcome: Progressing  5/3/2025 0624 by Cesar Zuñiga RN  Outcome: Progressing     Problem: Safety - Adult  Goal: Free from fall injury  5/3/2025 1839 by Becca Galaviz RN  Outcome: Progressing  5/3/2025 0624 by Cesar Zuñiga RN  Outcome: Progressing     Problem: Chronic Conditions and Co-morbidities  Goal: Patient's chronic conditions and co-morbidity symptoms are monitored and maintained or improved  Outcome: Progressing

## 2025-05-03 NOTE — H&P
[facility]  CDU / OBSERVATION ENCOUNTER  Physician NOTE     Pt Name: Mina Gill  MRN: [id number]  Birthdate 1940  Date of evaluation: 5/3/25  Patient's PCP is :  Tisha Galsgow MD    CHIEF COMPLAINT       Chief Complaint   Patient presents with    Chest Pain         HISTORY OF PRESENT ILLNESS    Mina Gill is a 84 y.o. male with a history of complete heart block on Medtronic pacemaker, heart failure, CAD, CKD, Parkinson who presents generalized weakness, fatigue and chest pain has been going on for a while now.  Denies any fall, shortness of breath, cough, congestion.    Location/Symptom: Generalized weakness  Timing/Onset: Weeks  Provocation: Movement  Quality: Malaise  Radiation: None  Severity: None  Timing/Duration: Weeks  Modifying Factors: None    History was obtained in part through review of the ED chart. When possible, a direct discussion was had with ED nurses, residents, and attendings  REVIEW OF SYSTEMS       General ROS - No fevers, generalized malaise   Ophthalmic ROS - No discharge, No changes in vision  ENT ROS -  No sore throat, No rhinorrhea,   Respiratory ROS - no shortness of breath, no cough, no  wheezing  Cardiovascular ROS - chest pain, no dyspnea on exertion  Gastrointestinal ROS - No abdominal pain, no nausea or vomiting, no change in bowel habits, no black or bloody stools  Genito-Urinary ROS - No dysuria, trouble voiding, or hematuria  Musculoskeletal ROS - No myalgias, arthalgias  Neurological ROS - No headache, dizziness/lightheadedness, No focal weakness, no loss of sensation  Dermatological ROS - No lesions, No rash     (PQRS) Advance directives on face sheet per hospital policy. 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 (HCC), CKD (chronic kidney disease) stage 2, GFR 60-89 ml/min, CKD (chronic kidney disease) stage 3, GFR 30-59 ml/min ([facility]),

## 2025-05-04 PROCEDURE — 1200000000 HC SEMI PRIVATE

## 2025-05-04 PROCEDURE — 2500000003 HC RX 250 WO HCPCS

## 2025-05-04 PROCEDURE — 97166 OT EVAL MOD COMPLEX 45 MIN: CPT

## 2025-05-04 PROCEDURE — 6370000000 HC RX 637 (ALT 250 FOR IP)

## 2025-05-04 PROCEDURE — 99232 SBSQ HOSP IP/OBS MODERATE 35: CPT | Performed by: PSYCHIATRY & NEUROLOGY

## 2025-05-04 PROCEDURE — 97530 THERAPEUTIC ACTIVITIES: CPT

## 2025-05-04 PROCEDURE — 99233 SBSQ HOSP IP/OBS HIGH 50: CPT | Performed by: NURSE PRACTITIONER

## 2025-05-04 PROCEDURE — 97535 SELF CARE MNGMENT TRAINING: CPT

## 2025-05-04 PROCEDURE — 6360000002 HC RX W HCPCS

## 2025-05-04 PROCEDURE — 97162 PT EVAL MOD COMPLEX 30 MIN: CPT

## 2025-05-04 RX ADMIN — ENTACAPONE 200 MG: 200 TABLET, FILM COATED ORAL at 21:34

## 2025-05-04 RX ADMIN — ENTACAPONE 200 MG: 200 TABLET, FILM COATED ORAL at 15:10

## 2025-05-04 RX ADMIN — CARBIDOPA AND LEVODOPA 2 TABLET: 25; 100 TABLET ORAL at 11:10

## 2025-05-04 RX ADMIN — CLOPIDOGREL BISULFATE 75 MG: 75 TABLET, FILM COATED ORAL at 11:10

## 2025-05-04 RX ADMIN — ROPINIROLE HYDROCHLORIDE 0.5 MG: 0.25 TABLET, FILM COATED ORAL at 15:10

## 2025-05-04 RX ADMIN — ENTACAPONE 200 MG: 200 TABLET, FILM COATED ORAL at 11:11

## 2025-05-04 RX ADMIN — CARBIDOPA AND LEVODOPA 2 TABLET: 25; 100 TABLET ORAL at 21:34

## 2025-05-04 RX ADMIN — ROPINIROLE HYDROCHLORIDE 0.5 MG: 0.25 TABLET, FILM COATED ORAL at 11:12

## 2025-05-04 RX ADMIN — ATORVASTATIN CALCIUM 40 MG: 40 TABLET, FILM COATED ORAL at 11:10

## 2025-05-04 RX ADMIN — ENOXAPARIN SODIUM 40 MG: 100 INJECTION SUBCUTANEOUS at 11:11

## 2025-05-04 RX ADMIN — SODIUM CHLORIDE, PRESERVATIVE FREE 10 ML: 5 INJECTION INTRAVENOUS at 21:35

## 2025-05-04 RX ADMIN — ROPINIROLE HYDROCHLORIDE 0.5 MG: 0.25 TABLET, FILM COATED ORAL at 21:34

## 2025-05-04 RX ADMIN — CARBIDOPA AND LEVODOPA 2 TABLET: 25; 100 TABLET ORAL at 15:13

## 2025-05-04 RX ADMIN — LATANOPROST 1 DROP: 50 SOLUTION OPHTHALMIC at 21:36

## 2025-05-04 RX ADMIN — TAMSULOSIN HYDROCHLORIDE 0.4 MG: 0.4 CAPSULE ORAL at 11:10

## 2025-05-04 RX ADMIN — SODIUM CHLORIDE, PRESERVATIVE FREE 10 ML: 5 INJECTION INTRAVENOUS at 11:10

## 2025-05-04 RX ADMIN — PANTOPRAZOLE SODIUM 20 MG: 20 TABLET, DELAYED RELEASE ORAL at 11:10

## 2025-05-04 ASSESSMENT — PAIN SCALES - GENERAL: PAINLEVEL_OUTOF10: 0

## 2025-05-04 NOTE — PLAN OF CARE
Problem: Discharge Planning  Goal: Discharge to home or other facility with appropriate resources  5/4/2025 1825 by Becca Galaviz RN  Outcome: Progressing  5/4/2025 0546 by Lina Burch RN  Outcome: Progressing     Problem: Safety - Adult  Goal: Free from fall injury  5/4/2025 1825 by Becca Galaviz RN  Outcome: Progressing  5/4/2025 0546 by Lina Burch RN  Outcome: Progressing     Problem: Chronic Conditions and Co-morbidities  Goal: Patient's chronic conditions and co-morbidity symptoms are monitored and maintained or improved  5/4/2025 1825 by Becca Galaviz RN  Outcome: Progressing  5/4/2025 0546 by Lina Burch RN  Outcome: Progressing     Problem: Pain  Goal: Verbalizes/displays adequate comfort level or baseline comfort level  5/4/2025 1825 by Becca Galaviz RN  Outcome: Progressing  5/4/2025 0546 by Lina Burch RN  Outcome: Progressing

## 2025-05-04 NOTE — PLAN OF CARE
Problem: Discharge Planning  Goal: Discharge to home or other facility with appropriate resources  5/4/2025 0546 by Lina Burch RN  Outcome: Progressing  5/3/2025 1839 by Becca Galaviz RN  Outcome: Progressing     Problem: Safety - Adult  Goal: Free from fall injury  5/4/2025 0546 by Lina Burch RN  Outcome: Progressing  5/3/2025 1839 by Becca Galaviz RN  Outcome: Progressing     Problem: Chronic Conditions and Co-morbidities  Goal: Patient's chronic conditions and co-morbidity symptoms are monitored and maintained or improved  5/4/2025 0546 by Lina Burch RN  Outcome: Progressing  5/3/2025 1839 by Becca Galaviz RN  Outcome: Progressing     Problem: Pain  Goal: Verbalizes/displays adequate comfort level or baseline comfort level  Outcome: Progressing

## 2025-05-04 NOTE — PROGRESS NOTES
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  Past Surgical History:  has a past surgical history that includes Colonoscopy (2012); shoulder surgery (Right); pr colsc flx w/rmvl of tumor polyp lesion snare tq (N/A, 2017); Colonoscopy (2017); Cholecystectomy, laparoscopic (N/A, 10/29/2022); Esophagogastroduodenoscopy (2023); Upper gastrointestinal endoscopy (N/A, 2023); Upper gastrointestinal endoscopy (N/A, 2023); Cardiac procedure (N/A, 2024); Cardiac procedure (N/A, 8/3/2024); and ep device procedure (N/A, 2024).    Discharge Recommendations   Further therapy recommended at discharge.    PT Equipment Recommendations  Equipment Needed: No  Other: Pt owns SPC.    Assessment  Body Structures, Functions, Activity Limitations Requiring Skilled Therapeutic Intervention: Decreased functional mobility , Decreased strength, Decreased endurance, Decreased balance  Assessment: Pt amb 60' CGA SPC. Pt is limited by fatigue in ambulation. Pt reports requiring significant assistance w/ ADL's at baseline, ambulates w/ SPC in limited community distances. Pt ambulates w/ shuffling steps w/ asymmetric step length

## 2025-05-04 NOTE — PROGRESS NOTES
OBS/CDU   Progress NOTE      Patients PCP is:  Tisha Glasgow MD    SUBJECTIVE      Patient complaining of generalized weakness this morning.  No further complaints of pain or discomfort with no acute events overnight.  Patient requesting a small cup of beer to help him with energy which he states helps at home.  Denies any worsening chest pain, shortness of breath, nausea, vomiting.     PHYSICAL EXAM      General: NAD, AO X 3  Heent: EOMI, PERRL  Neck: SUPPLE, NO JVD  Cardiovascular: RRR, S1S2  Pulmonary: CTAB, NO SOB  Abdomen: SOFT, NTTP, ND, +BS  Extremities: +2/4 PULSES DISTAL, NO SWELLING  Neuro / Psych: Mentation at baseline, no new focal deficits, resting tremor in bilateral upper extremities left worse than right which is chronic for the patient, some dysarthria present with no aphasia    PERTINENT TEST /EXAMS      I have reviewed all available laboratory results.    MEDICATIONS CURRENT   sodium chloride flush 0.9 % injection 10 mL, PRN  sodium chloride flush 0.9 % injection 10 mL, PRN  midodrine (PROAMATINE) tablet 5 mg, TID WC  atorvastatin (LIPITOR) tablet 40 mg, Daily  clopidogrel (PLAVIX) tablet 75 mg, Daily  [Held by provider] gabapentin (NEURONTIN) capsule 100 mg, TID  latanoprost (XALATAN) 0.005 % ophthalmic solution 1 drop, Nightly  pantoprazole (PROTONIX) tablet 20 mg, Daily  rOPINIRole (REQUIP) tablet 0.5 mg, TID  tamsulosin (FLOMAX) capsule 0.4 mg, Daily  sodium chloride flush 0.9 % injection 5-40 mL, 2 times per day  sodium chloride flush 0.9 % injection 5-40 mL, PRN  0.9 % sodium chloride infusion, PRN  enoxaparin (LOVENOX) injection 40 mg, Daily  acetaminophen (TYLENOL) tablet 650 mg, Q4H PRN  ondansetron (ZOFRAN-ODT) disintegrating tablet 4 mg, Q8H PRN   Or  ondansetron (ZOFRAN) injection 4 mg, Q6H PRN  carbidopa-levodopa (SINEMET)  MG per tablet 2 tablet, TID   And  entacapone (COMTAN) tablet 200 mg, TID        All medication charted and reviewed.    CONSULTS      IP CONSULT TO

## 2025-05-04 NOTE — CARE COORDINATION
Case Management Assessment  Initial Evaluation    Date/Time of Evaluation: 5/4/2025 8:55AM  Assessment Completed by: Kristina Garcia RN    If patient is discharged prior to next notation, then this note serves as note for discharge by case management.    Patient Name: Mina Gill                   YOB: 1940  Diagnosis: Chest pain [R07.9]                   Date / Time: 5/2/2025  1:28 PM    Patient Admission Status: Inpatient   Readmission Risk (Low < 19, Mod (19-27), High > 27): Readmission Risk Score: 22.8    Current PCP: Tisha Glasgow MD  PCP verified by CM? (P) Yes (Tisha Glasgow MD)    Chart Reviewed: Yes      History Provided by: Patient  Patient Orientation: Alert and Oriented, Person, Place, Situation    Patient Cognition: Alert    Hospitalization in the last 30 days (Readmission):  Yes    If yes, Readmission Assessment in CM Navigator will be completed.    Advance Directives:      Code Status: Full Code   Patient's Primary Decision Maker is: (P) Named in Scanned ACP Document    Primary Decision Maker: Delvin Gill - Child - [phone]    Secondary Decision Maker: Gill,Ivone - Child - [phone]    Discharge Planning:    Patient lives with: (P) Friends Type of Home: (P) House  Primary Care Giver: Self  Patient Support Systems include: Home Care Staff, Children, Friends/Neighbors   Current Financial resources: (P) Medicaid, Medicare  Current community resources:    Current services prior to admission: (P) Durable Medical Equipment, Home Care            Current DME: (P) Walker, Wheelchair, Cane, Other (Comment) (rollator)            Type of Home Care services:  (P) Nursing Services    ADLS  Prior functional level: (P) Independent in ADLs/IADLs  Current functional level: (P) Assistance with the following:, Bathing, Dressing, Toileting, Mobility    PT AM-PAC:   /24  OT AM-PAC:   /24    Family can provide assistance at DC: (P) No  Would you like Case Management to discuss the discharge plan

## 2025-05-04 NOTE — PROGRESS NOTES
[facility]  CDU / OBSERVATION ENCOUNTER  ATTENDING NOTE         I performed a history and physical examination of the patient and discussed management with the resident or midlevel provider. I reviewed the resident or midlevel provider's note and agree with the documented findings and plan of care. Any areas of disagreement are noted on the chart. I was personally present for the key portions of any procedures. I have documented in the chart those procedures where I was not present during the key portions. I have reviewed the nurses notes. I agree with the chief complaint, past medical history, past surgical history, allergies, medications, social and family history as documented unless otherwise noted below.    The Family history, social history, and ROS are effectively unchanged since admission unless noted elsewhere in the chart.     This patient was placed in the observation unit for reevaluation for possible admission to the hospital     Signed off by neurology and cardiology.  Appreciate input.  Patient started on midodrine.  Monitor patient on medications.  Patient has had multiple bounce backs in the last several weeks and I want to see you response clinically to his treatment prior to discharge at this time.  Discussed with nursing.    Patient's son not at the bedside.  Attempted to call son regarding ultimate disposition.  Patient is considering ECF.  This is different than patient's prior disposition plans.    Will see how patient is doing after discussion with family and after 24 hours on new medication regimen       Frank Kirby MD  Attending Emergency  Physician

## 2025-05-04 NOTE — CARE COORDINATION
05/04/25 0855   Readmission Assessment   Number of Days since last admission? 1-7 days   Previous Disposition Home with Home Health   Who is being Interviewed Patient   What was the patient's/caregiver's perception as to why they think they needed to return back to the hospital? Other (Comment)  (chest pain)   Did you visit your Primary Care Physician after you left the hospital, before you returned this time? Yes   Did you see a specialist, such as Cardiac, Pulmonary, Orthopedic Physician, etc. after you left the hospital? No   Who advised the patient to return to the hospital? Self-referral   Does the patient report anything that got in the way of taking their medications? No   In our efforts to provide the best possible care to you and others like you, can you think of anything that we could have done to help you after you left the hospital the first time, so that you might not have needed to return so soon? Other (Comment)  (nothing)

## 2025-05-04 NOTE — PROGRESS NOTES
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  Past Surgical History:  has a past surgical history that includes Colonoscopy (2012); shoulder surgery (Right); pr colsc flx w/rmvl of tumor polyp lesion snare tq (N/A, 2017); Colonoscopy (2017); Cholecystectomy, laparoscopic (N/A, 10/29/2022); Esophagogastroduodenoscopy (2023); Upper gastrointestinal endoscopy (N/A, 2023); Upper gastrointestinal endoscopy (N/A, 2023); Cardiac procedure (N/A, 2024); Cardiac procedure (N/A, 8/3/2024); and ep device procedure (N/A, 2024).    Discharge Recommendations  Discharge Recommendations: Patient would benefit from continued therapy after discharge  OT Equipment Recommendations  Other: CTA    Assessment  Performance deficits / Impairments: Decreased functional mobility ;Decreased endurance;Decreased ADL status;Decreased balance;Decreased high-level IADLs;Decreased safe awareness;Decreased cognition  Prognosis: Good  Decision Making: Medium Complexity  REQUIRES OT FOLLOW-UP: Yes  Activity Tolerance  Activity Tolerance: Patient limited by fatigue;Patient limited by pain;Treatment limited secondary to decreased

## 2025-05-04 NOTE — CARE COORDINATION
05/04/25 0855   Readmission Assessment   Number of Days since last admission? 8-30 days   Previous Disposition Home with Home Health   Who is being Interviewed Patient   What was the patient's/caregiver's perception as to why they think they needed to return back to the hospital? Other (Comment)  (chest pain)   Did you visit your Primary Care Physician after you left the hospital, before you returned this time? Yes   Who advised the patient to return to the hospital? Self-referral   Does the patient report anything that got in the way of taking their medications? No   In our efforts to provide the best possible care to you and others like you, can you think of anything that we could have done to help you after you left the hospital the first time, so that you might not have needed to return so soon? Other (Comment)  (nothing)

## 2025-05-04 NOTE — PROGRESS NOTES
[facility]   Progress Note                   Date:   5/4/2025  Patient name: Mina Gill  Date of admission:  5/2/2025  1:28 PM  MRN:   [id number]  YOB: 1940  PCP: Tisha Glasgow MD    Reason for Admission: Chest pain [R07.9]    Subjective:       Clinical Changes / Abnormalities:Pt seen and examined in the room.  Patient resting in bed. Pt denies any CP or sob. He complains of generalized weakness  Labs, vitals and tele reviewed-paced    Medications:   Scheduled Meds:   midodrine  5 mg Oral TID WC    atorvastatin  40 mg Oral Daily    clopidogrel  75 mg Oral Daily    [Held by provider] gabapentin  100 mg Oral TID    latanoprost  1 drop Both Eyes Nightly    pantoprazole  20 mg Oral Daily    rOPINIRole  0.5 mg Oral TID    tamsulosin  0.4 mg Oral Daily    sodium chloride flush  5-40 mL IntraVENous 2 times per day    enoxaparin  40 mg SubCUTAneous Daily    carbidopa-levodopa  2 tablet Oral TID    And    entacapone  200 mg Oral TID     Continuous Infusions:   sodium chloride       CBC:   Recent Labs     05/02/25  1335   WBC 6.0   HGB 13.8        BMP:    Recent Labs     05/02/25  1730      K 4.3      CO2 26   BUN 15   CREATININE 1.3*   GLUCOSE 177*     Hepatic:   Recent Labs     05/02/25  1730   AST 27   ALT 23   BILITOT 0.5   ALKPHOS 128     Troponin:   Recent Labs     05/02/25  1730 05/02/25  1835   TROPHS 26* 23*     BNP: No results for input(s): \"BNP\" in the last 72 hours.  Lipids: No results for input(s): \"CHOL\", \"HDL\" in the last 72 hours.    Invalid input(s): \"LDLCALCU\"  INR: No results for input(s): \"INR\" in the last 72 hours.  Cardiac Testing:  Last Echo:  03/18/25     ECHO (TTE) COMPLETE (PRN CONTRAST/BUBBLE/STRAIN/3D) 03/20/2025  2:22 PM (Final)     Interpretation Summary    Left Ventricle: Low normal left ventricular systolic function with a visually estimated EF of 50 - 55%. EF by 2D Simpsons Biplane is 54%. Left ventricle size is normal. Normal wall

## 2025-05-04 NOTE — PROGRESS NOTES
[facility] Neurology   IN-PATIENT SERVICE  General Neurology Progress Note   [facility]                Date:   5/4/2025  Patient name:  Mina Gill  Date of admission:  5/2/2025  1:28 PM  MRN:   [id number]  Account:  [id number]  YOB: 1940  PCP:    Tisha Glasgow MD  Room:   [address] [place] [address]  Code Status:    Full Code  Chief Complaint:   Chief Complaint   Patient presents with    Chest Pain       Interval history      The patient was seen and examined at bedside this morning.     Labs, chart, and VS reviewed. No acute events overnight. Resting comfortably in bed. Exam unchanged.    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 He also reports a headache, described as similar to his chronic post-traumatic headaches dating back to his TBI in 2011, with no change in character or severity. Neurology was consulted for evaluation.     The patient reports good compliance with his Parkinson’s medications, including carbidopa-levodopa-entacapone (STALEVO 200) TID and ropinirole 1 mg TID, consistent with his most recent outpatient neurology follow-up from February 2025. His tremors reportedly improve with sensory tricks, and he continues to require caregiver assistance with ADLs due to progressive stiffness but has had no significant cognitive decline.     On exam, the patient exhibits hallmark features of Parkinson’s disease, including bilateral resting (pill-rolling) tremor more prominent on the left, masked facies, generalized bradykinesia, and cogwheel rigidity. No new focal neurological deficits were identified. Strength 5/5 throughout.      Review of Systems   Review of Systems    Past Medical History:   Past Medical History:   Diagnosis Date     facial sensation                                                               VII - mask face looked less prominent                                VIII - intact hearing                                                                             IX, X - symmetrical palate                                                                  XI - symmetrical shoulder shrug                                                       XII - midline tongue without atrophy or fasciculation      Motor function  Normal muscle bulk and tone  Muscle strength: normal power 5/5 throughout     Resting pill rolling tremor noticed in b/l upper extremity (L > R).       Sensory function Intact to touch, pin prick, vibration, proprioception in bilateral upper and lower extremities.       Cerebellar Patient has decreasing amplitude and rapid alternating movements     Cogwheel rigidity      Reflex function Intact 2+ DTR and symmetric. Negative Babinski      Gait                  Is using a walker           Investigations   Laboratory Testing:  Recent Results (from the past 24 hours)   TSH reflex to FT4    Collection Time: 05/03/25 10:24 AM   Result Value Ref Range    TSH 6.00 (H) 0.27 - 4.20 uIU/mL   Vitamin B12 & Folate    Collection Time: 05/03/25 10:24 AM   Result Value Ref Range    Vitamin B-12 1698 (H) 232 - 1245 pg/mL    Folate 17.1 4.8 - 24.2 ng/mL   Vitamin D 25 Hydroxy    Collection Time: 05/03/25 10:24 AM   Result Value Ref Range    Vit D, 25-Hydroxy 28.4 (L) 30.0 - 100.0 ng/mL   T4, Free    Collection Time: 05/03/25 10:24 AM   Result Value Ref Range    T4 Free 1.0 0.92 - 1.68 ng/dL   Nuclear stress test with myocardial perfusion    Collection Time: 05/03/25 12:40 PM   Result Value Ref Range    Body Surface Area 1.96 m2    Baseline Systolic  mmHg    Baseline Diastolic BP 68 mmHg    Stress Systolic  mmHg    Stress Diastolic BP 68 mmHg    Baseline HR 69 BPM    Stress Peak HR 91 BPM    Stress Estimated Workload 1.0 METS

## 2025-05-04 NOTE — PROGRESS NOTES
Occupational Therapy    [facility]  Occupational Therapy Not Seen Note    DATE: 2025    NAME: Mina Gill  MRN: [id number]   : 1940      Patient not seen this date for Occupational Therapy due to:    Testing: Leaving for radiology    Next Scheduled Treatment: Attempt in pm or     Electronically signed by Antonio Gibson OT on 2025 at 11:20 AM

## 2025-05-04 NOTE — CARE COORDINATION
Case Management   Daily Progress Note       Patient Name: Mina Gill                   YOB: 1940  Diagnosis: Chest pain [R07.9]                         days  Length of Stay: 1  days    Anticipated Discharge Date: To be determined    Readmission Risk (Low < 19, Mod (19-27), High > 27): Readmission Risk Score: 22.9        Current Transitional Plan    [] Home Independently    [] Home with HC    [x] Skilled Nursing Facility    [] Acute Rehabilitation    [] Long Term Acute Care (LTAC)    [] Other:     Plan for the Stay (Medical Management) :          Workflow Continuation (Additional Notes) :  SNF choices given-#1[facility], #2 [facility], #3 [facility]. Faxed referrals faxed to listed SNF's.      Kristina Garcia RN  May 4, 2025

## 2025-05-05 PROCEDURE — 2500000003 HC RX 250 WO HCPCS

## 2025-05-05 PROCEDURE — 6370000000 HC RX 637 (ALT 250 FOR IP)

## 2025-05-05 PROCEDURE — 6370000000 HC RX 637 (ALT 250 FOR IP): Performed by: PSYCHIATRY & NEUROLOGY

## 2025-05-05 PROCEDURE — 1200000000 HC SEMI PRIVATE

## 2025-05-05 PROCEDURE — 99233 SBSQ HOSP IP/OBS HIGH 50: CPT | Performed by: NURSE PRACTITIONER

## 2025-05-05 PROCEDURE — 6360000002 HC RX W HCPCS

## 2025-05-05 RX ORDER — ACETAMINOPHEN 325 MG/1
650 TABLET ORAL ONCE
Status: COMPLETED | OUTPATIENT
Start: 2025-05-05 | End: 2025-05-05

## 2025-05-05 RX ADMIN — ACETAMINOPHEN 650 MG: 325 TABLET ORAL at 21:01

## 2025-05-05 RX ADMIN — CARBIDOPA AND LEVODOPA 2 TABLET: 25; 100 TABLET ORAL at 21:01

## 2025-05-05 RX ADMIN — ROPINIROLE HYDROCHLORIDE 0.5 MG: 0.25 TABLET, FILM COATED ORAL at 09:22

## 2025-05-05 RX ADMIN — Medication 5 MG: at 21:01

## 2025-05-05 RX ADMIN — CLOPIDOGREL BISULFATE 75 MG: 75 TABLET, FILM COATED ORAL at 09:23

## 2025-05-05 RX ADMIN — SODIUM CHLORIDE, PRESERVATIVE FREE 10 ML: 5 INJECTION INTRAVENOUS at 09:23

## 2025-05-05 RX ADMIN — ENTACAPONE 200 MG: 200 TABLET, FILM COATED ORAL at 09:22

## 2025-05-05 RX ADMIN — ATORVASTATIN CALCIUM 40 MG: 40 TABLET, FILM COATED ORAL at 09:23

## 2025-05-05 RX ADMIN — LATANOPROST 1 DROP: 50 SOLUTION OPHTHALMIC at 21:01

## 2025-05-05 RX ADMIN — CARBIDOPA AND LEVODOPA 2 TABLET: 25; 100 TABLET ORAL at 13:45

## 2025-05-05 RX ADMIN — MIDODRINE HYDROCHLORIDE 5 MG: 5 TABLET ORAL at 13:45

## 2025-05-05 RX ADMIN — MIDODRINE HYDROCHLORIDE 5 MG: 5 TABLET ORAL at 09:22

## 2025-05-05 RX ADMIN — ROPINIROLE HYDROCHLORIDE 0.5 MG: 0.25 TABLET, FILM COATED ORAL at 13:45

## 2025-05-05 RX ADMIN — PANTOPRAZOLE SODIUM 20 MG: 20 TABLET, DELAYED RELEASE ORAL at 09:22

## 2025-05-05 RX ADMIN — TAMSULOSIN HYDROCHLORIDE 0.4 MG: 0.4 CAPSULE ORAL at 09:23

## 2025-05-05 RX ADMIN — ACETAMINOPHEN 650 MG: 325 TABLET ORAL at 09:23

## 2025-05-05 RX ADMIN — ROPINIROLE HYDROCHLORIDE 0.5 MG: 0.25 TABLET, FILM COATED ORAL at 21:01

## 2025-05-05 RX ADMIN — ENTACAPONE 200 MG: 200 TABLET, FILM COATED ORAL at 13:45

## 2025-05-05 RX ADMIN — CARBIDOPA AND LEVODOPA 2 TABLET: 25; 100 TABLET ORAL at 09:22

## 2025-05-05 RX ADMIN — MIDODRINE HYDROCHLORIDE 5 MG: 5 TABLET ORAL at 16:44

## 2025-05-05 RX ADMIN — ENOXAPARIN SODIUM 40 MG: 100 INJECTION SUBCUTANEOUS at 09:23

## 2025-05-05 RX ADMIN — ENTACAPONE 200 MG: 200 TABLET, FILM COATED ORAL at 21:01

## 2025-05-05 RX ADMIN — SODIUM CHLORIDE, PRESERVATIVE FREE 10 ML: 5 INJECTION INTRAVENOUS at 21:03

## 2025-05-05 NOTE — CARE COORDINATION
Case Management   Daily Progress Note       Patient Name: Mina Gill                   YOB: 1940  Diagnosis: Chest pain [R07.9]                       GMLOS: 1.7 days  Length of Stay: 2  days    Anticipated Discharge Date: One day until discharge    Readmission Risk (Low < 19, Mod (19-27), High > 27): Readmission Risk Score: 22.8        Current Transitional Plan    [] Home Independently    [] Home with HC    [x] Skilled Nursing Facility    [] Acute Rehabilitation    [] Long Term Acute Care (LTAC)    [] Other:     Plan for the Stay (Medical Management) :          Workflow Continuation (Additional Notes) :[facility] and [facility] can accept. Pt updated, [facility] facility of choice. [facility].         LASHONDA JUNE RN  May 5, 2025

## 2025-05-05 NOTE — DISCHARGE INSTR - COC
Continuity of Care Form    Patient Name: Mina Gill   :  1940  MRN:  [id number]    Admit date:  2025  Discharge date:  2025    Code Status Order: Full Code   Advance Directives:     Admitting Physician:  Frank Kirby MD  PCP: Tisha Glasgow MD    Discharging Nurse: SKIP Purcell  Discharging Hospital Unit/Room#: 0547/0547-01  Discharging Unit Phone Number: [phone]    Emergency Contact:   Extended Emergency Contact Information  Primary Emergency Contact: Delvin Gill  Address: [facility] [place] [address]  Home Phone: [phone]  Relation: Child  Secondary Emergency Contact: JairoIvone           [place], [place]  Home Phone: [phone]  Mobile Phone: [phone]  Relation: Child    Past Surgical History:  Past Surgical History:   Procedure Laterality Date    CARDIAC PROCEDURE N/A 2024    ali / Left heart cath / coronary angiography performed by Lobito Tracy MD at [facility] CARDIAC CATH LAB    CARDIAC PROCEDURE N/A 8/3/2024    Insert temporary pacemaker performed by Guerita Jeffrey MD at [facility] CARDIAC CATH LAB    CHOLECYSTECTOMY, LAPAROSCOPIC N/A 10/29/2022    LAPROSCOPIC CHOLECYSTECTOMY performed by Cj Valencia DO at [facility] OR    COLONOSCOPY  2012    poor prep, tubular adenoma    COLONOSCOPY  2017    diverticulosis, multiple polyps as described, spot marking done, pathology-tubular adenoma x 4    EP DEVICE PROCEDURE N/A 2024    you / ppm implant / rm 3021 performed by Omid Stone MD at [facility] CARDIAC CATH LAB    ESOPHAGOGASTRODUODENOSCOPY  2023    WA COLSC FLX W/RMVL OF TUMOR POLYP LESION SNARE TQ N/A 2017    COLONOSCOPY POLYPECTOMY SNARE/COLD BIOPSY with tatooing and apc transverse colon performed by Ross Garcia MD at [facility] OR    SHOULDER SURGERY Right     rotator cuff    UPPER GASTROINTESTINAL ENDOSCOPY N/A 2023    EGD ESOPHAGOGASTRODUODENOSCOPY performed by Constantine Brown MD at [facility] OR    UPPER GASTROINTESTINAL  Independent  Med Delivery   whole and prefers mixed with 1 to 2 pills at a time with apple sauce    Wound Care Documentation and Therapy:  Wound 03/19/25 Toe (Comment  which one) Right #2 (Active)   Number of days: 47        Elimination:  Continence:   Bowel: Yes  Bladder: Yes  Urinary Catheter: None   Colostomy/Ileostomy/Ileal Conduit: No       Date of Last BM: 5/3/2025    Intake/Output Summary (Last 24 hours) at 5/5/2025 1541  Last data filed at 5/5/2025 0005  Gross per 24 hour   Intake 480 ml   Output 300 ml   Net 180 ml     I/O last 3 completed shifts:  In: 960 [P.O.:960]  Out: 1475 [Urine:1475]    Safety Concerns:     At Risk for Falls    Impairments/Disabilities:      Vision, hearing    Nutrition Therapy:  Current Nutrition Therapy:   - Oral Diet:  General  - Oral Nutrition Supplement:  Standard  three times a day    Routes of Feeding: Oral  Liquids: No Restrictions  Daily Fluid Restriction: no  Last Modified Barium Swallow with Video (Video Swallowing Test): not done not order     Treatments at the Time of Hospital Discharge:   Respiratory Treatments: N/A  Oxygen Therapy:  is not on home oxygen therapy.  Ventilator:    - No ventilator support    Rehab Therapies: Physical Therapy and Occupational Therapy  Weight Bearing Status/Restrictions: No weight bearing restrictions  Other Medical Equipment (for information only, NOT a DME order):  walker  Other Treatments: N/A    Patient's personal belongings (please select all that are sent with patient):  Glasses, Dentures upper and lower, ***    RN SIGNATURE:  {Esignature:[id number]}    CASE MANAGEMENT/SOCIAL WORK SECTION    Inpatient Status Date: ***    Readmission Risk Assessment Score:  [facility] RISK OF UNPLANNED READMISSION 2.0             22.4 Total Score        Discharging to Facility/ Agency   Name: Mehreen  Address:  Phone:  Fax:    Dialysis Facility (if applicable)   Name:  Address:  Dialysis Schedule:  Phone:  Fax:    / signature:

## 2025-05-05 NOTE — PROGRESS NOTES
OBS/CDU   Progress NOTE      Patients PCP is:  Tisha Glasgow MD    SUBJECTIVE      Patient complaining of generalized weakness this morning.  No further complaints of pain or discomfort with no acute events overnight.  He is admitting to a mild headache this morning, no focal neurologic deficits, worsening weakness.  Denies any chest pain, shortness of breath, nausea, vomiting.    PHYSICAL EXAM      General: NAD, AO X 3  Heent: EOMI, PERRL  Neck: SUPPLE, NO JVD  Cardiovascular: RRR, S1S2  Pulmonary: CTAB, NO SOB  Abdomen: SOFT, NTTP, ND, +BS  Extremities: +2/4 PULSES DISTAL, NO SWELLING  Neuro / Psych: Mentation at baseline, no new focal deficits, resting tremor in bilateral upper extremities left worse than right which is chronic for the patient, some dysarthria present with no aphasia    PERTINENT TEST /EXAMS      I have reviewed all available laboratory results.    MEDICATIONS CURRENT   melatonin tablet 5 mg, Nightly  sodium chloride flush 0.9 % injection 10 mL, PRN  sodium chloride flush 0.9 % injection 10 mL, PRN  midodrine (PROAMATINE) tablet 5 mg, TID WC  atorvastatin (LIPITOR) tablet 40 mg, Daily  clopidogrel (PLAVIX) tablet 75 mg, Daily  [Held by provider] gabapentin (NEURONTIN) capsule 100 mg, TID  latanoprost (XALATAN) 0.005 % ophthalmic solution 1 drop, Nightly  pantoprazole (PROTONIX) tablet 20 mg, Daily  rOPINIRole (REQUIP) tablet 0.5 mg, TID  tamsulosin (FLOMAX) capsule 0.4 mg, Daily  sodium chloride flush 0.9 % injection 5-40 mL, 2 times per day  sodium chloride flush 0.9 % injection 5-40 mL, PRN  0.9 % sodium chloride infusion, PRN  enoxaparin (LOVENOX) injection 40 mg, Daily  acetaminophen (TYLENOL) tablet 650 mg, Q4H PRN  ondansetron (ZOFRAN-ODT) disintegrating tablet 4 mg, Q8H PRN   Or  ondansetron (ZOFRAN) injection 4 mg, Q6H PRN  carbidopa-levodopa (SINEMET)  MG per tablet 2 tablet, TID   And  entacapone (COMTAN) tablet 200 mg, TID        All medication charted and

## 2025-05-05 NOTE — PROGRESS NOTES
Physician Progress Note      PATIENT:               JACOB THOMSON  [facility] #:                  [id number]  :                       1940  ADMIT DATE:       2025 1:28 PM  DISCH DATE:  RESPONDING  PROVIDER #:        MICHAEL NEWSOME          QUERY TEXT:    Chest pain is documented in the medical record ***(date/provider). Please   specify the underlying cause:    The clinical indicators include:  Trop 26 > 23   CXR No acute  HR 70-73 B/P 129/81  Lexiscan stress test: Low risk  -         No further chest pain/cardiac workup  -         Antihypertensives on hold, continue midodrine 5 mg 3 times daily    H/P IM  [facility] [place] weakness, Fatigue CP for awhile now  GCS 15 Rm Air BMI 28.19  EKG V Paced-ST Segments: Some ST elevation across lateral leads however not   meeting Sgarbossa criteria  HX CHB w AICD    Neuro Consult Autonomic instability, debilitating tremor  Parkinson's disease  -        Continue home Stalevo 200 (carbidopa-levodopa-entacapone; 50, 200,   200)  -        Recommend PT/OT for gait    / Cardiology  Pt denies any CP or sob. He complains of generalized weakness  Labs, vitals   and tele reviewed-paced  No further CP, stress test low risk, no further cardiac work up  Orthostatic hypotension secondary to autonomic   dysfunction/parkinsons-Antihypertensives on hold,    Treatments: Labs monitor Lexiscan EKG Cardiology/Neuro consults CXR  Options provided:  -- Chest pain related to CAD  with unstable angina  -- Chest pain related to CAD without unstable angina  -- Chest pain related to orthostatic hypotension  -- Chest pain related to GERD  -- Chest pain related to please specify an etiology  -- Other - I will add my own diagnosis  -- Disagree - Not applicable / Not valid  -- Disagree - Clinically unable to determine / Unknown  -- Refer to Clinical Documentation Reviewer    PROVIDER RESPONSE TEXT:    Provider is clinically unable to determine a response to this query.    Query created by: Carina Holm on

## 2025-05-05 NOTE — PROGRESS NOTES
[facility]   Progress Note                   Date:   5/5/2025  Patient name: Mina Gill  Date of admission:  5/2/2025  1:28 PM  MRN:   [id number]  YOB: 1940  PCP: Tisha Glasgow MD    Reason for Admission: Chest pain [R07.9]    Subjective:       Clinical Changes / Abnormalities:Pt seen and examined in the room.  Patient resting in bed. Pt denies any CP or sob. He complains of a headache Labs, vitals and tele reviewed-paced    Medications:   Scheduled Meds:   melatonin  5 mg Oral Nightly    midodrine  5 mg Oral TID WC    atorvastatin  40 mg Oral Daily    clopidogrel  75 mg Oral Daily    [Held by provider] gabapentin  100 mg Oral TID    latanoprost  1 drop Both Eyes Nightly    pantoprazole  20 mg Oral Daily    rOPINIRole  0.5 mg Oral TID    tamsulosin  0.4 mg Oral Daily    sodium chloride flush  5-40 mL IntraVENous 2 times per day    enoxaparin  40 mg SubCUTAneous Daily    carbidopa-levodopa  2 tablet Oral TID    And    entacapone  200 mg Oral TID     Continuous Infusions:   sodium chloride       CBC:   Recent Labs     05/02/25  1335   WBC 6.0   HGB 13.8        BMP:    Recent Labs     05/02/25  1730      K 4.3      CO2 26   BUN 15   CREATININE 1.3*   GLUCOSE 177*     Hepatic:   Recent Labs     05/02/25  1730   AST 27   ALT 23   BILITOT 0.5   ALKPHOS 128     Troponin:   Recent Labs     05/02/25  1730 05/02/25  1835   TROPHS 26* 23*     BNP: No results for input(s): \"BNP\" in the last 72 hours.  Lipids: No results for input(s): \"CHOL\", \"HDL\" in the last 72 hours.    Invalid input(s): \"LDLCALCU\"  INR: No results for input(s): \"INR\" in the last 72 hours.  Cardiac Testing:  Last Echo:  03/18/25     ECHO (TTE) COMPLETE (PRN CONTRAST/BUBBLE/STRAIN/3D) 03/20/2025  2:22 PM (Final)     Interpretation Summary    Left Ventricle: Low normal left ventricular systolic function with a visually estimated EF of 50 - 55%. EF by 2D Simpsons Biplane is 54%. Left ventricle size is

## 2025-05-05 NOTE — PROGRESS NOTES
Perfect Served On Call CDU Resident in regards to patient's heart monitor order . If you want patient to stay on heart monitor please reorder. Thank you     Read at [phone]

## 2025-05-05 NOTE — PROGRESS NOTES
[facility]  CDU / OBSERVATION ENCOUNTER  ATTENDING NOTE         I performed a history and physical examination of the patient and discussed management with the resident or midlevel provider. I reviewed the resident or midlevel provider's note and agree with the documented findings and plan of care. Any areas of disagreement are noted on the chart. I was personally present for the key portions of any procedures. I have documented in the chart those procedures where I was not present during the key portions. I have reviewed the nurses notes. I agree with the chief complaint, past medical history, past surgical history, allergies, medications, social and family history as documented unless otherwise noted below.    The Family history, social history, and ROS are effectively unchanged since admission unless noted elsewhere in the chart.     This patient was placed in the observation unit for reevaluation for possible admission to the hospital     Spoke with patient's son and nurse regarding placement.  Case management has found a acceptable facility for patient.  Patient's son is comfortable with this plan.  Patient is comfortable as well.  Patient looking well today.  Patient for ECF once preapproval was completed.  Patient discharged to extended-care facility pending insurance       Frank Kirby MD  Attending Emergency  Physician

## 2025-05-05 NOTE — PLAN OF CARE
Problem: Discharge Planning  Goal: Discharge to home or other facility with appropriate resources  5/5/2025 0638 by Lina Burch RN  Outcome: Progressing  5/4/2025 1825 by Becca Galaviz RN  Outcome: Progressing     Problem: Safety - Adult  Goal: Free from fall injury  5/5/2025 0638 by Lina Burch RN  Outcome: Progressing  Flowsheets (Taken 5/5/2025 0251)  Free From Fall Injury: Instruct family/caregiver on patient safety  5/4/2025 1825 by Becca Galaviz RN  Outcome: Progressing     Problem: Chronic Conditions and Co-morbidities  Goal: Patient's chronic conditions and co-morbidity symptoms are monitored and maintained or improved  5/5/2025 0638 by Lina Burch RN  Outcome: Progressing  5/4/2025 1825 by Becca Galaviz RN  Outcome: Progressing     Problem: Pain  Goal: Verbalizes/displays adequate comfort level or baseline comfort level  5/5/2025 0638 by Lina Burch RN  Outcome: Progressing  5/4/2025 1825 by Becca Galaviz RN  Outcome: Progressing

## 2025-05-05 NOTE — PLAN OF CARE
Problem: Discharge Planning  Goal: Discharge to home or other facility with appropriate resources  5/5/2025 1613 by Hussein Victoria RN  Outcome: Progressing  5/5/2025 0638 by Lina Burch RN  Outcome: Progressing     Problem: Safety - Adult  Goal: Free from fall injury  5/5/2025 1613 by Hussein Victoria RN  Outcome: Progressing  5/5/2025 0638 by Lina Burch RN  Outcome: Progressing  Flowsheets (Taken 5/5/2025 0251)  Free From Fall Injury: Instruct family/caregiver on patient safety     Problem: Chronic Conditions and Co-morbidities  Goal: Patient's chronic conditions and co-morbidity symptoms are monitored and maintained or improved  5/5/2025 1613 by Hussein Victoria RN  Outcome: Progressing  5/5/2025 0638 by Lina Burch RN  Outcome: Progressing     Problem: Pain  Goal: Verbalizes/displays adequate comfort level or baseline comfort level  5/5/2025 1613 by Hussein Victoria RN  Outcome: Progressing  5/5/2025 0638 by Lina Burch RN  Outcome: Progressing

## 2025-05-06 LAB — TROPONIN I SERPL HS-MCNC: 25 NG/L (ref 0–22)

## 2025-05-06 PROCEDURE — 97530 THERAPEUTIC ACTIVITIES: CPT

## 2025-05-06 PROCEDURE — 97116 GAIT TRAINING THERAPY: CPT

## 2025-05-06 PROCEDURE — 36415 COLL VENOUS BLD VENIPUNCTURE: CPT

## 2025-05-06 PROCEDURE — 97535 SELF CARE MNGMENT TRAINING: CPT

## 2025-05-06 PROCEDURE — 93005 ELECTROCARDIOGRAM TRACING: CPT | Performed by: EMERGENCY MEDICINE

## 2025-05-06 PROCEDURE — 6370000000 HC RX 637 (ALT 250 FOR IP)

## 2025-05-06 PROCEDURE — 6360000002 HC RX W HCPCS

## 2025-05-06 PROCEDURE — 2500000003 HC RX 250 WO HCPCS

## 2025-05-06 PROCEDURE — 1200000000 HC SEMI PRIVATE

## 2025-05-06 PROCEDURE — 97110 THERAPEUTIC EXERCISES: CPT

## 2025-05-06 PROCEDURE — 84484 ASSAY OF TROPONIN QUANT: CPT

## 2025-05-06 RX ADMIN — SODIUM CHLORIDE, PRESERVATIVE FREE 10 ML: 5 INJECTION INTRAVENOUS at 22:05

## 2025-05-06 RX ADMIN — ATORVASTATIN CALCIUM 40 MG: 40 TABLET, FILM COATED ORAL at 08:45

## 2025-05-06 RX ADMIN — ENTACAPONE 200 MG: 200 TABLET, FILM COATED ORAL at 08:44

## 2025-05-06 RX ADMIN — CARBIDOPA AND LEVODOPA 2 TABLET: 25; 100 TABLET ORAL at 14:15

## 2025-05-06 RX ADMIN — SODIUM CHLORIDE, PRESERVATIVE FREE 10 ML: 5 INJECTION INTRAVENOUS at 08:45

## 2025-05-06 RX ADMIN — ENOXAPARIN SODIUM 40 MG: 100 INJECTION SUBCUTANEOUS at 08:44

## 2025-05-06 RX ADMIN — CARBIDOPA AND LEVODOPA 2 TABLET: 25; 100 TABLET ORAL at 22:04

## 2025-05-06 RX ADMIN — ENTACAPONE 200 MG: 200 TABLET, FILM COATED ORAL at 22:04

## 2025-05-06 RX ADMIN — ROPINIROLE HYDROCHLORIDE 0.5 MG: 0.25 TABLET, FILM COATED ORAL at 22:04

## 2025-05-06 RX ADMIN — ACETAMINOPHEN 650 MG: 325 TABLET ORAL at 22:03

## 2025-05-06 RX ADMIN — ENTACAPONE 200 MG: 200 TABLET, FILM COATED ORAL at 14:15

## 2025-05-06 RX ADMIN — ACETAMINOPHEN 650 MG: 325 TABLET ORAL at 15:26

## 2025-05-06 RX ADMIN — CARBIDOPA AND LEVODOPA 2 TABLET: 25; 100 TABLET ORAL at 08:44

## 2025-05-06 RX ADMIN — LATANOPROST 1 DROP: 50 SOLUTION OPHTHALMIC at 22:05

## 2025-05-06 RX ADMIN — ROPINIROLE HYDROCHLORIDE 0.5 MG: 0.25 TABLET, FILM COATED ORAL at 14:15

## 2025-05-06 RX ADMIN — CLOPIDOGREL BISULFATE 75 MG: 75 TABLET, FILM COATED ORAL at 08:45

## 2025-05-06 RX ADMIN — ROPINIROLE HYDROCHLORIDE 0.5 MG: 0.25 TABLET, FILM COATED ORAL at 08:44

## 2025-05-06 RX ADMIN — PANTOPRAZOLE SODIUM 20 MG: 20 TABLET, DELAYED RELEASE ORAL at 08:45

## 2025-05-06 RX ADMIN — Medication 5 MG: at 22:05

## 2025-05-06 RX ADMIN — TAMSULOSIN HYDROCHLORIDE 0.4 MG: 0.4 CAPSULE ORAL at 08:44

## 2025-05-06 ASSESSMENT — PAIN DESCRIPTION - ORIENTATION
ORIENTATION: ANTERIOR
ORIENTATION: MID

## 2025-05-06 ASSESSMENT — PAIN SCALES - GENERAL
PAINLEVEL_OUTOF10: 4
PAINLEVEL_OUTOF10: 2

## 2025-05-06 ASSESSMENT — PAIN DESCRIPTION - LOCATION
LOCATION: HEAD
LOCATION: HEAD

## 2025-05-06 ASSESSMENT — PAIN - FUNCTIONAL ASSESSMENT: PAIN_FUNCTIONAL_ASSESSMENT: ACTIVITIES ARE NOT PREVENTED

## 2025-05-06 ASSESSMENT — PAIN DESCRIPTION - DESCRIPTORS: DESCRIPTORS: ACHING

## 2025-05-06 NOTE — PROGRESS NOTES
[facility]  CDU / OBSERVATION ENCOUNTER  ATTENDING NOTE       I discussed the case with the midlevel provider who has documented a note on this patient.  We agreed on management and treatment plan.      No new medical complaint today.  Excepted to ECF.  Awaiting placement.    Frank Kirby MD  Attending Emergency  Physician

## 2025-05-06 NOTE — PLAN OF CARE
Problem: Discharge Planning  Goal: Discharge to home or other facility with appropriate resources  Outcome: Progressing     Problem: Safety - Adult  Goal: Free from fall injury  Outcome: Progressing     Problem: Chronic Conditions and Co-morbidities  Goal: Patient's chronic conditions and co-morbidity symptoms are monitored and maintained or improved  5/6/2025 1901 by Becca Galaviz RN  Outcome: Progressing  5/6/2025 0830 by Quentin Valladares RN  Outcome: Progressing     Problem: Pain  Goal: Verbalizes/displays adequate comfort level or baseline comfort level  5/6/2025 1901 by Becca Galaviz RN  Outcome: Progressing  5/6/2025 0830 by Quentin Valladares RN  Outcome: Progressing

## 2025-05-06 NOTE — CARE COORDINATION
Case Management   Daily Progress Note       Patient Name: Mina Gill                   YOB: 1940  Diagnosis: Chest pain [R07.9]                       GMLOS: 1.7 days  Length of Stay: 3  days    Anticipated Discharge Date: One day until discharge    Readmission Risk (Low < 19, Mod (19-27), High > 27): Readmission Risk Score: 21.7        Current Transitional Plan    [] Home Independently    [] Home with HC    [] Skilled Nursing Facility    [] Acute Rehabilitation    [] Long Term Acute Care (LTAC)    [] Other:     Plan for the Stay (Medical Management) :          Workflow Continuation (Additional Notes) : Auth for SNF was denied.   Offering P2P - Needs to be scheduled within 2 days.   #[phone]  Option 3   Ref # [id number]  I spoke with the pt and he states that he is not safe to return home due to weakness and no support. PS Dr. Kirby.         LASHONDA JUNE, RN  May 6, 2025

## 2025-05-06 NOTE — PROGRESS NOTES
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  Past Surgical History:  has a past surgical history that includes Colonoscopy (2012); shoulder surgery (Right); pr colsc flx w/rmvl of tumor polyp lesion snare tq (N/A, 2017); Colonoscopy (2017); Cholecystectomy, laparoscopic (N/A, 10/29/2022); Esophagogastroduodenoscopy (2023); Upper gastrointestinal endoscopy (N/A, 2023); Upper gastrointestinal endoscopy (N/A, 2023); Cardiac procedure (N/A, 2024); Cardiac procedure (N/A, 8/3/2024); and ep device procedure (N/A, 2024).    Discharge Recommendations  Discharge Recommendations: Patient would benefit from continued therapy after discharge  PT Equipment Recommendations  Equipment Needed: No  Other: Pt owns SPC.    Assessment  Body Structures, Functions, Activity Limitations Requiring Skilled Therapeutic Intervention: Decreased functional mobility ;Decreased strength;Decreased endurance;Decreased balance  Assessment: Patient completed transfers with use of RW, CGA. Patient ambulated with use of RW, 75ft, grossly CGA. Patient required x3 standing rest breaks and report of increased dizziness following

## 2025-05-06 NOTE — PROGRESS NOTES
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  Past Surgical History:  has a past surgical history that includes Colonoscopy (2012); shoulder surgery (Right); pr colsc flx w/rmvl of tumor polyp lesion snare tq (N/A, 2017); Colonoscopy (2017); Cholecystectomy, laparoscopic (N/A, 10/29/2022); Esophagogastroduodenoscopy (2023); Upper gastrointestinal endoscopy (N/A, 2023); Upper gastrointestinal endoscopy (N/A, 2023); Cardiac procedure (N/A, 2024); Cardiac procedure (N/A, 8/3/2024); and ep device procedure (N/A, 2024).    Discharge Recommendations  Discharge Recommendations: Patient would benefit from continued therapy after discharge       Assessment  Performance deficits / Impairments: Decreased functional mobility ;Decreased endurance;Decreased ADL status;Decreased balance;Decreased high-level IADLs;Decreased safe awareness;Decreased cognition  Assessment: Pt limited by above deficits impacting ADL completion, ADL transfers and safe functional mobility to maximize pts potential and quality of life, decrease caregiver burden and enable pt to return home with assistance as prior to

## 2025-05-07 LAB
CRP SERPL HS-MCNC: 5.4 MG/L (ref 0–5)
ERYTHROCYTE [SEDIMENTATION RATE] IN BLOOD BY PHOTOMETRIC METHOD: 29 MM/HR (ref 0–20)
TROPONIN I SERPL HS-MCNC: 22 NG/L (ref 0–22)
TROPONIN I SERPL HS-MCNC: 22 NG/L (ref 0–22)
TROPONIN I SERPL HS-MCNC: 24 NG/L (ref 0–22)

## 2025-05-07 PROCEDURE — 93005 ELECTROCARDIOGRAM TRACING: CPT | Performed by: EMERGENCY MEDICINE

## 2025-05-07 PROCEDURE — 6370000000 HC RX 637 (ALT 250 FOR IP)

## 2025-05-07 PROCEDURE — 1200000000 HC SEMI PRIVATE

## 2025-05-07 PROCEDURE — 84484 ASSAY OF TROPONIN QUANT: CPT

## 2025-05-07 PROCEDURE — 36415 COLL VENOUS BLD VENIPUNCTURE: CPT

## 2025-05-07 PROCEDURE — 81003 URINALYSIS AUTO W/O SCOPE: CPT

## 2025-05-07 PROCEDURE — 85652 RBC SED RATE AUTOMATED: CPT

## 2025-05-07 PROCEDURE — 86140 C-REACTIVE PROTEIN: CPT

## 2025-05-07 PROCEDURE — 6370000000 HC RX 637 (ALT 250 FOR IP): Performed by: PSYCHIATRY & NEUROLOGY

## 2025-05-07 PROCEDURE — 2500000003 HC RX 250 WO HCPCS

## 2025-05-07 PROCEDURE — 6360000002 HC RX W HCPCS

## 2025-05-07 RX ADMIN — ROPINIROLE HYDROCHLORIDE 0.5 MG: 0.25 TABLET, FILM COATED ORAL at 13:28

## 2025-05-07 RX ADMIN — SODIUM CHLORIDE, PRESERVATIVE FREE 10 ML: 5 INJECTION INTRAVENOUS at 20:58

## 2025-05-07 RX ADMIN — ROPINIROLE HYDROCHLORIDE 0.5 MG: 0.25 TABLET, FILM COATED ORAL at 20:57

## 2025-05-07 RX ADMIN — ENTACAPONE 200 MG: 200 TABLET, FILM COATED ORAL at 20:58

## 2025-05-07 RX ADMIN — CLOPIDOGREL BISULFATE 75 MG: 75 TABLET, FILM COATED ORAL at 10:46

## 2025-05-07 RX ADMIN — ENTACAPONE 200 MG: 200 TABLET, FILM COATED ORAL at 13:28

## 2025-05-07 RX ADMIN — ATORVASTATIN CALCIUM 40 MG: 40 TABLET, FILM COATED ORAL at 10:45

## 2025-05-07 RX ADMIN — ENTACAPONE 200 MG: 200 TABLET, FILM COATED ORAL at 10:46

## 2025-05-07 RX ADMIN — ACETAMINOPHEN 650 MG: 325 TABLET ORAL at 10:46

## 2025-05-07 RX ADMIN — LATANOPROST 1 DROP: 50 SOLUTION OPHTHALMIC at 20:57

## 2025-05-07 RX ADMIN — MIDODRINE HYDROCHLORIDE 5 MG: 5 TABLET ORAL at 13:28

## 2025-05-07 RX ADMIN — CARBIDOPA AND LEVODOPA 2 TABLET: 25; 100 TABLET ORAL at 20:57

## 2025-05-07 RX ADMIN — CARBIDOPA AND LEVODOPA 2 TABLET: 25; 100 TABLET ORAL at 10:45

## 2025-05-07 RX ADMIN — Medication 5 MG: at 20:57

## 2025-05-07 RX ADMIN — CARBIDOPA AND LEVODOPA 2 TABLET: 25; 100 TABLET ORAL at 13:28

## 2025-05-07 RX ADMIN — SODIUM CHLORIDE, PRESERVATIVE FREE 10 ML: 5 INJECTION INTRAVENOUS at 10:48

## 2025-05-07 RX ADMIN — TAMSULOSIN HYDROCHLORIDE 0.4 MG: 0.4 CAPSULE ORAL at 10:46

## 2025-05-07 RX ADMIN — ENOXAPARIN SODIUM 40 MG: 100 INJECTION SUBCUTANEOUS at 10:48

## 2025-05-07 RX ADMIN — PANTOPRAZOLE SODIUM 20 MG: 20 TABLET, DELAYED RELEASE ORAL at 10:46

## 2025-05-07 RX ADMIN — ROPINIROLE HYDROCHLORIDE 0.5 MG: 0.25 TABLET, FILM COATED ORAL at 10:46

## 2025-05-07 ASSESSMENT — PAIN DESCRIPTION - DESCRIPTORS
DESCRIPTORS: ACHING;BURNING
DESCRIPTORS: ACHING;BURNING

## 2025-05-07 ASSESSMENT — PAIN DESCRIPTION - LOCATION
LOCATION: ABDOMEN;CHEST
LOCATION: HEAD;CHEST

## 2025-05-07 ASSESSMENT — PAIN SCALES - GENERAL
PAINLEVEL_OUTOF10: 5
PAINLEVEL_OUTOF10: 6

## 2025-05-07 NOTE — CARE COORDINATION
Case Management   Daily Progress Note       Patient Name: Mina Gill                   YOB: 1940  Diagnosis: Chest pain [R07.9]                       GMLOS: 1.7 days  Length of Stay: 4  days    Anticipated Discharge Date: Longer than expected LOS    Readmission Risk (Low < 19, Mod (19-27), High > 27): Readmission Risk Score: 21.7        Current Transitional Plan    [] Home Independently    [] Home with HC    [x] Skilled Nursing Facility    [] Acute Rehabilitation    [] Long Term Acute Care (LTAC)    [] Other:     Plan for the Stay (Medical Management) :          Workflow Continuation (Additional Notes) :  0959 talked with patient. Informed him he was denied SNF([facility]). Peer to peer needed. Patient states he doesn't feel well today. Says he is too weak to go home. 0966 PS Dr. Kirby regarding the Peer to Peer. He said he scheduled one.      Kristina Garcia, RN  May 7, 2025

## 2025-05-07 NOTE — PLAN OF CARE
Problem: Discharge Planning  Goal: Discharge to home or other facility with appropriate resources  5/7/2025 0512 by Neema Sullivan RN  Outcome: Progressing  Flowsheets (Taken 5/6/2025 2000)  Discharge to home or other facility with appropriate resources:   Identify barriers to discharge with patient and caregiver   Arrange for needed discharge resources and transportation as appropriate   Identify discharge learning needs (meds, wound care, etc)   Refer to discharge planning if patient needs post-hospital services based on physician order or complex needs related to functional status, cognitive ability or social support system  5/6/2025 1901 by Becca Galaviz RN  Outcome: Progressing     Problem: Safety - Adult  Goal: Free from fall injury  5/7/2025 0512 by Neema Sullivan RN  Outcome: Progressing  Flowsheets (Taken 5/7/2025 0010)  Free From Fall Injury:   Instruct family/caregiver on patient safety   Based on caregiver fall risk screen, instruct family/caregiver to ask for assistance with transferring infant if caregiver noted to have fall risk factors  5/6/2025 1901 by Becca Galaviz RN  Outcome: Progressing     Problem: Chronic Conditions and Co-morbidities  Goal: Patient's chronic conditions and co-morbidity symptoms are monitored and maintained or improved  5/7/2025 0512 by Neema Sullivan RN  Outcome: Progressing  Flowsheets (Taken 5/6/2025 2000)  Care Plan - Patient's Chronic Conditions and Co-Morbidity Symptoms are Monitored and Maintained or Improved:   Monitor and assess patient's chronic conditions and comorbid symptoms for stability, deterioration, or improvement   Collaborate with multidisciplinary team to address chronic and comorbid conditions and prevent exacerbation or deterioration   Update acute care plan with appropriate goals if chronic or comorbid symptoms are exacerbated and prevent overall improvement and discharge  5/6/2025 1901 by Becca Galaviz RN  Outcome: Progressing     Problem:

## 2025-05-07 NOTE — PROGRESS NOTES
OBS/CDU   Progress NOTE      Patients PCP is:  Tisha Glasgow MD        SUBJECTIVE      Patient with poorly characterized sensation of fatigue.  Patient with chest discomfort.  Patient has been on monitor with episodes listed as V. tach and V-fib but these were evaluated yesterday and felt to be due to his tremor.  Pacemaker interrogated today.    Patient is nonspecific in the way he feels and has the symptoms episodically.  And cannot be clearly associated with any 1 thing and he is speculating that it had something to do with prior occupational exposure.  Patient is concerned that he is getting an infection.  He is not clear on where.  Patient also concerned about underlying potential cancer.    PHYSICAL EXAM      General: NAD, AO X 3  Heent: EOMI, PERRL  Neck: SUPPLE, NO JVD  Cardiovascular: RRR, S1S2  Pulmonary: CTAB, NO SOB  Abdomen: SOFT, NTTP, ND, +BS  Extremities: +2/4 PULSES DISTAL, NO SWELLING  Neuro / Psych: NO NUMBNESS OR TINGLING, MENTATION AT BASELINE    PERTINENT TEST /EXAMS      I have reviewed all available laboratory results.    MEDICATIONS CURRENT   melatonin tablet 5 mg, Nightly  sodium chloride flush 0.9 % injection 10 mL, PRN  sodium chloride flush 0.9 % injection 10 mL, PRN  midodrine (PROAMATINE) tablet 5 mg, TID WC  atorvastatin (LIPITOR) tablet 40 mg, Daily  clopidogrel (PLAVIX) tablet 75 mg, Daily  [Held by provider] gabapentin (NEURONTIN) capsule 100 mg, TID  latanoprost (XALATAN) 0.005 % ophthalmic solution 1 drop, Nightly  pantoprazole (PROTONIX) tablet 20 mg, Daily  rOPINIRole (REQUIP) tablet 0.5 mg, TID  tamsulosin (FLOMAX) capsule 0.4 mg, Daily  sodium chloride flush 0.9 % injection 5-40 mL, 2 times per day  sodium chloride flush 0.9 % injection 5-40 mL, PRN  0.9 % sodium chloride infusion, PRN  enoxaparin (LOVENOX) injection 40 mg, Daily  acetaminophen (TYLENOL) tablet 650 mg, Q4H PRN  ondansetron (ZOFRAN-ODT) disintegrating tablet 4 mg, Q8H PRN   Or  ondansetron (ZOFRAN)

## 2025-05-07 NOTE — PROGRESS NOTES
Patient called out for complaints of his pacemaker having a burning sensation. His pacer is showing failure to capture, which lasted for about a minute times two. then returned to paced rhythm His vitals are hr 70 and bp 138/86. Patient is stating the he is feeling weird/ goofy. Primary provider and cardiology fellow notified.

## 2025-05-07 NOTE — PROGRESS NOTES
[facility]  CDU / OBSERVATION ENCOUNTER  INTERVAL NOTE       In a routine reassessment of the patient I have found the following:        Patient resting ` in bed eating dinner with family at bedside.  He states that he feels symptomatically better today but still has an intermittent headache.  Patient's family asking about patient's prescription of Ubrelvy which was prescribed for him to take every 48 hours for headache/migraine.  We will follow-up with the pharmacy to see if the medication is in stock.  Plan to continue to evaluate patient and treat symptoms with plan for PMR evaluation for acute rehab.    The Family history, social history, and ROS are effectively unchanged since admission unless noted elsewhere in the chart.    The patient has been updated on the plan of care.  The patient is agreeable with the current plan.    JW Hills - CNP

## 2025-05-07 NOTE — PLAN OF CARE
Problem: Discharge Planning  Goal: Discharge to home or other facility with appropriate resources  5/7/2025 1733 by Selma Julien RN  Outcome: Progressing  5/7/2025 0512 by Neema Sullivan RN  Outcome: Progressing  Flowsheets (Taken 5/6/2025 2000)  Discharge to home or other facility with appropriate resources:   Identify barriers to discharge with patient and caregiver   Arrange for needed discharge resources and transportation as appropriate   Identify discharge learning needs (meds, wound care, etc)   Refer to discharge planning if patient needs post-hospital services based on physician order or complex needs related to functional status, cognitive ability or social support system     Problem: Safety - Adult  Goal: Free from fall injury  5/7/2025 1733 by Selma Julien RN  Outcome: Progressing  5/7/2025 0512 by Neema Sullivan RN  Outcome: Progressing  Flowsheets (Taken 5/7/2025 0010)  Free From Fall Injury:   Instruct family/caregiver on patient safety   Based on caregiver fall risk screen, instruct family/caregiver to ask for assistance with transferring infant if caregiver noted to have fall risk factors     Problem: Chronic Conditions and Co-morbidities  Goal: Patient's chronic conditions and co-morbidity symptoms are monitored and maintained or improved  5/7/2025 1733 by Selma Julien RN  Outcome: Progressing  5/7/2025 0512 by Neema Sullivan RN  Outcome: Progressing  Flowsheets (Taken 5/6/2025 2000)  Care Plan - Patient's Chronic Conditions and Co-Morbidity Symptoms are Monitored and Maintained or Improved:   Monitor and assess patient's chronic conditions and comorbid symptoms for stability, deterioration, or improvement   Collaborate with multidisciplinary team to address chronic and comorbid conditions and prevent exacerbation or deterioration   Update acute care plan with appropriate goals if chronic or comorbid symptoms are exacerbated and prevent overall improvement and

## 2025-05-08 LAB
BILIRUB UR QL STRIP: NEGATIVE
CLARITY UR: CLEAR
COLOR UR: ABNORMAL
COMMENT: ABNORMAL
EKG ATRIAL RATE: 88 BPM
EKG ATRIAL RATE: 96 BPM
EKG P AXIS: 82 DEGREES
EKG P AXIS: 84 DEGREES
EKG P-R INTERVAL: 208 MS
EKG P-R INTERVAL: 224 MS
EKG Q-T INTERVAL: 420 MS
EKG Q-T INTERVAL: 456 MS
EKG QRS DURATION: 160 MS
EKG QRS DURATION: 160 MS
EKG QTC CALCULATION (BAZETT): 492 MS
EKG QTC CALCULATION (BAZETT): 508 MS
EKG R AXIS: -69 DEGREES
EKG R AXIS: -78 DEGREES
EKG T AXIS: 102 DEGREES
EKG T AXIS: 94 DEGREES
EKG VENTRICULAR RATE: 70 BPM
EKG VENTRICULAR RATE: 88 BPM
GLUCOSE UR STRIP-MCNC: ABNORMAL MG/DL
HGB UR QL STRIP.AUTO: NEGATIVE
KETONES UR STRIP-MCNC: ABNORMAL MG/DL
LEUKOCYTE ESTERASE UR QL STRIP: NEGATIVE
NITRITE UR QL STRIP: NEGATIVE
PH UR STRIP: 7 [PH] (ref 5–8)
PROT UR STRIP-MCNC: NEGATIVE MG/DL
SP GR UR STRIP: 1.02 (ref 1–1.03)
UROBILINOGEN UR STRIP-ACNC: NORMAL EU/DL (ref 0–1)

## 2025-05-08 PROCEDURE — 99222 1ST HOSP IP/OBS MODERATE 55: CPT | Performed by: STUDENT IN AN ORGANIZED HEALTH CARE EDUCATION/TRAINING PROGRAM

## 2025-05-08 PROCEDURE — 1200000000 HC SEMI PRIVATE

## 2025-05-08 PROCEDURE — 2500000003 HC RX 250 WO HCPCS

## 2025-05-08 PROCEDURE — 97110 THERAPEUTIC EXERCISES: CPT

## 2025-05-08 PROCEDURE — 97535 SELF CARE MNGMENT TRAINING: CPT

## 2025-05-08 PROCEDURE — 6370000000 HC RX 637 (ALT 250 FOR IP): Performed by: PSYCHIATRY & NEUROLOGY

## 2025-05-08 PROCEDURE — 93010 ELECTROCARDIOGRAM REPORT: CPT | Performed by: INTERNAL MEDICINE

## 2025-05-08 PROCEDURE — 6370000000 HC RX 637 (ALT 250 FOR IP)

## 2025-05-08 PROCEDURE — 97116 GAIT TRAINING THERAPY: CPT

## 2025-05-08 PROCEDURE — 6360000002 HC RX W HCPCS

## 2025-05-08 PROCEDURE — 97530 THERAPEUTIC ACTIVITIES: CPT

## 2025-05-08 RX ADMIN — ENTACAPONE 200 MG: 200 TABLET, FILM COATED ORAL at 21:02

## 2025-05-08 RX ADMIN — ROPINIROLE HYDROCHLORIDE 0.5 MG: 0.25 TABLET, FILM COATED ORAL at 14:20

## 2025-05-08 RX ADMIN — ENTACAPONE 200 MG: 200 TABLET, FILM COATED ORAL at 09:28

## 2025-05-08 RX ADMIN — CARBIDOPA AND LEVODOPA 2 TABLET: 25; 100 TABLET ORAL at 14:20

## 2025-05-08 RX ADMIN — CLOPIDOGREL BISULFATE 75 MG: 75 TABLET, FILM COATED ORAL at 09:28

## 2025-05-08 RX ADMIN — ACETAMINOPHEN 650 MG: 325 TABLET ORAL at 12:43

## 2025-05-08 RX ADMIN — ENOXAPARIN SODIUM 40 MG: 100 INJECTION SUBCUTANEOUS at 09:28

## 2025-05-08 RX ADMIN — LATANOPROST 1 DROP: 50 SOLUTION OPHTHALMIC at 21:02

## 2025-05-08 RX ADMIN — PANTOPRAZOLE SODIUM 20 MG: 20 TABLET, DELAYED RELEASE ORAL at 09:29

## 2025-05-08 RX ADMIN — MIDODRINE HYDROCHLORIDE 5 MG: 5 TABLET ORAL at 12:43

## 2025-05-08 RX ADMIN — SODIUM CHLORIDE, PRESERVATIVE FREE 10 ML: 5 INJECTION INTRAVENOUS at 21:03

## 2025-05-08 RX ADMIN — TAMSULOSIN HYDROCHLORIDE 0.4 MG: 0.4 CAPSULE ORAL at 09:28

## 2025-05-08 RX ADMIN — ENTACAPONE 200 MG: 200 TABLET, FILM COATED ORAL at 14:20

## 2025-05-08 RX ADMIN — ROPINIROLE HYDROCHLORIDE 0.5 MG: 0.25 TABLET, FILM COATED ORAL at 21:02

## 2025-05-08 RX ADMIN — Medication 5 MG: at 21:02

## 2025-05-08 RX ADMIN — ATORVASTATIN CALCIUM 40 MG: 40 TABLET, FILM COATED ORAL at 21:02

## 2025-05-08 RX ADMIN — CARBIDOPA AND LEVODOPA 2 TABLET: 25; 100 TABLET ORAL at 09:28

## 2025-05-08 RX ADMIN — MIDODRINE HYDROCHLORIDE 5 MG: 5 TABLET ORAL at 17:46

## 2025-05-08 RX ADMIN — CARBIDOPA AND LEVODOPA 2 TABLET: 25; 100 TABLET ORAL at 21:02

## 2025-05-08 RX ADMIN — SODIUM CHLORIDE, PRESERVATIVE FREE 10 ML: 5 INJECTION INTRAVENOUS at 09:29

## 2025-05-08 RX ADMIN — ROPINIROLE HYDROCHLORIDE 0.5 MG: 0.25 TABLET, FILM COATED ORAL at 09:29

## 2025-05-08 ASSESSMENT — PAIN SCALES - GENERAL
PAINLEVEL_OUTOF10: 3
PAINLEVEL_OUTOF10: 2

## 2025-05-08 NOTE — PROGRESS NOTES
[facility]  CDU / OBSERVATION eNCOUnter  Attending NOte       I performed a history and physical examination of the patient and discussed management with the resident.  This patient was placed in the observation unit for reevaluation for possible admission to the hospital. I reviewed the resident’s note and agree with the documented findings and plan of care. Any areas of disagreement are noted on the chart. I was personally present for the key portions of any procedures. I have documented in the chart those procedures where I was not present during the key portions. I have reviewed the nurses notes. I agree with the chief complaint, past medical history, past surgical history, allergies, medications, social and family history as documented unless otherwise noted below.    The patient was placed in the observation unit for reevaluation for possible admission to the hospital.      The Family history, social history, and ROS are effectively unchanged since admission unless noted elsewhere in the chart.    PM&R consult plan for today.  Placement attempts ongoing.    Jesika Jacob MD  Attending Emergency  Physician

## 2025-05-08 NOTE — PROGRESS NOTES
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  Past Surgical History:  has a past surgical history that includes Colonoscopy (2012); shoulder surgery (Right); pr colsc flx w/rmvl of tumor polyp lesion snare tq (N/A, 2017); Colonoscopy (2017); Cholecystectomy, laparoscopic (N/A, 10/29/2022); Esophagogastroduodenoscopy (2023); Upper gastrointestinal endoscopy (N/A, 2023); Upper gastrointestinal endoscopy (N/A, 2023); Cardiac procedure (N/A, 2024); Cardiac procedure (N/A, 2024); and ep device procedure (N/A, 2024).    Discharge Recommendations  Discharge Recommendations: Patient would benefit from continued therapy after discharge     Assessment  Performance deficits / Impairments: Decreased functional mobility ;Decreased endurance;Decreased ADL status;Decreased balance;Decreased high-level IADLs;Decreased safe awareness;Decreased cognition;Decreased strength;Decreased posture  Assessment: Patient demonstrates decreased balance, endurance and safety awareness impacting performance in ADLs and functional tasks. Pt completed bed mobility at Min A and mobility at Min A to complete toileting tasks at mod A

## 2025-05-08 NOTE — PLAN OF CARE
Problem: Discharge Planning  Goal: Discharge to home or other facility with appropriate resources  5/8/2025 0248 by Whit Crawley RN  Outcome: Progressing  5/7/2025 1733 by Selma Julien RN  Outcome: Progressing     Problem: Safety - Adult  Goal: Free from fall injury  5/8/2025 0248 by Whit Crawley RN  Outcome: Progressing  5/7/2025 1733 by Selma Julien RN  Outcome: Progressing     Problem: Chronic Conditions and Co-morbidities  Goal: Patient's chronic conditions and co-morbidity symptoms are monitored and maintained or improved  5/8/2025 0248 by Whit Crawley RN  Outcome: Progressing  5/7/2025 1733 by Selma Julien RN  Outcome: Progressing     Problem: Pain  Goal: Verbalizes/displays adequate comfort level or baseline comfort level  5/8/2025 0248 by Whit Crawley RN  Outcome: Progressing  5/7/2025 1733 by Selma Julien RN  Outcome: Progressing     Problem: Neurosensory - Adult  Goal: Achieves stable or improved neurological status  Outcome: Progressing  Goal: Absence of seizures  Outcome: Progressing  Goal: Remains free of injury related to seizures activity  Outcome: Progressing  Goal: Achieves maximal functionality and self care  Outcome: Progressing     Problem: Cardiovascular - Adult  Goal: Maintains optimal cardiac output and hemodynamic stability  Outcome: Progressing  Goal: Absence of cardiac dysrhythmias or at baseline  Outcome: Progressing     Problem: Musculoskeletal - Adult  Goal: Return mobility to safest level of function  Outcome: Progressing  Goal: Maintain proper alignment of affected body part  Outcome: Progressing  Goal: Return ADL status to a safe level of function  Outcome: Progressing     Problem: Infection - Adult  Goal: Absence of infection at discharge  Outcome: Progressing  Goal: Absence of infection during hospitalization  Outcome: Progressing  Goal: Absence of fever/infection during anticipated neutropenic period  Outcome: Progressing

## 2025-05-08 NOTE — PROGRESS NOTES
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  Past Surgical History:  has a past surgical history that includes Colonoscopy (2012); shoulder surgery (Right); pr colsc flx w/rmvl of tumor polyp lesion snare tq (N/A, 2017); Colonoscopy (2017); Cholecystectomy, laparoscopic (N/A, 10/29/2022); Esophagogastroduodenoscopy (2023); Upper gastrointestinal endoscopy (N/A, 2023); Upper gastrointestinal endoscopy (N/A, 2023); Cardiac procedure (N/A, 2024); Cardiac procedure (N/A, 2024); and ep device procedure (N/A, 2024).    Discharge Recommendations  Discharge Recommendations: Patient would benefit from continued therapy after discharge  PT Equipment Recommendations  Equipment Needed: No  Other: Pt owns SPC.    Assessment  Body Structures, Functions, Activity Limitations Requiring Skilled Therapeutic Intervention: Decreased functional mobility ;Decreased strength;Decreased endurance;Decreased balance  Assessment: Patient completed transfers with use of RW, CGA for sit->stand and Shoshana for stand->sit. Patient ambulated with use of RW, 65ft total, with Shoshana. Patient required multiple standing

## 2025-05-08 NOTE — CONSULTS
Physical Medicine & Rehabilitation  Consult Note      Admitting Physician:   Frank Kirby MD    Primary Care Provider:   Tisha Glasgow MD     Reason for Consult:  Acute Inpatient Rehabilitation    Chief Complaint: Weakness, fatigue, chest pain    History of Present Illness:  Referring Provider is requesting an evaluation for appropriate placement upon discharge from acute care. History from chart review and patient.    Mina Gill is a 84 y.o. right-handed male with history of Parkinson disease, complete heart block s/p pacemaker, CHF, CAD, CKD, GERD admitted to [facility] on 5/2/2025.      He initially presented with generalized weakness, fatigue, and chest pain. Stress test showed low risk.  Cardiology is recommending outpatient follow up.  Neurology is recommending outpatient follow up for Parkinson disease.    He reports intermittent weakness and fatigue.  He states that chest pain has resolved at the time of evaluation.  He also notes left knee pain.    Review of Systems:  Review of Systems   Constitutional:  Positive for malaise/fatigue. Negative for fever.   Cardiovascular:  Negative for chest pain.   Neurological:  Positive for weakness.      Premorbid function:  Needing assistance with ADLs, IADLs    Current function:  Restrictions/ Precautions-  Restrictions/Precautions  Restrictions/Precautions: Fall Risk, Contact Precautions  Activity Level: Up as Tolerated  Required Braces or Orthoses?: No    PT:    Bed Mobility-  Bed mobility  Supine to Sit: Minimal assistance (assistance for trunk progression)  Sit to Supine:  (Retired to recliner)  Scooting: Contact guard assistance  Bed Mobility Comments: Bed mobility not formally assessed. Patient began and ended session in recliner. Scooting assessed in recliner, CGA.    Transfers-  Transfers  Sit to Stand: Contact guard assistance  Stand to Sit: Minimal Assistance  Comment: Patient completed transfers with use of RW. Patient required [place] for

## 2025-05-08 NOTE — PLAN OF CARE
Problem: Discharge Planning  Goal: Discharge to home or other facility with appropriate resources  Outcome: Progressing     Problem: Safety - Adult  Goal: Free from fall injury  Outcome: Progressing     Problem: Chronic Conditions and Co-morbidities  Goal: Patient's chronic conditions and co-morbidity symptoms are monitored and maintained or improved  Outcome: Progressing     Problem: Pain  Goal: Verbalizes/displays adequate comfort level or baseline comfort level  Outcome: Progressing     Problem: Neurosensory - Adult  Goal: Achieves stable or improved neurological status  Outcome: Progressing  Goal: Absence of seizures  Outcome: Progressing  Goal: Remains free of injury related to seizures activity  Outcome: Progressing  Goal: Achieves maximal functionality and self care  Outcome: Progressing     Problem: Cardiovascular - Adult  Goal: Maintains optimal cardiac output and hemodynamic stability  Outcome: Progressing  Goal: Absence of cardiac dysrhythmias or at baseline  Outcome: Progressing     Problem: Musculoskeletal - Adult  Goal: Return mobility to safest level of function  Outcome: Progressing  Goal: Maintain proper alignment of affected body part  Outcome: Progressing  Goal: Return ADL status to a safe level of function  Outcome: Progressing     Problem: Infection - Adult  Goal: Absence of infection at discharge  Outcome: Progressing  Goal: Absence of infection during hospitalization  Outcome: Progressing  Goal: Absence of fever/infection during anticipated neutropenic period  Outcome: Progressing

## 2025-05-08 NOTE — PROGRESS NOTES
OBS/CDU   Progress NOTE      Patients PCP is:  Tisha Glasgow MD        SUBJECTIVE      Patient evaluated in the bedside chair this morning. Patient with poorly characterized sensation of fatigue which has been ongoing for the last few days.  Patient with chest discomfort which has been unchanged over the last few days. Patient is nonspecific in the way he feels and has the symptoms episodically.  Complaining of a mild headache this morning that is no different than his usual headaches.    PHYSICAL EXAM      General: NAD, AO X 3  Heent: EOMI, PERRL  Neck: SUPPLE, NO JVD  Cardiovascular: RRR, S1S2  Pulmonary: CTAB, NO SOB  Abdomen: SOFT, NTTP, ND, +BS  Extremities: +2/4 PULSES DISTAL, NO SWELLING  Neuro / Psych: NO NUMBNESS OR TINGLING, MENTATION AT BASELINE    PERTINENT TEST /EXAMS      I have reviewed all available laboratory results.    MEDICATIONS CURRENT   Ubrogepant TABS 50 mg, Q48H  melatonin tablet 5 mg, Nightly  sodium chloride flush 0.9 % injection 10 mL, PRN  sodium chloride flush 0.9 % injection 10 mL, PRN  midodrine (PROAMATINE) tablet 5 mg, TID WC  atorvastatin (LIPITOR) tablet 40 mg, Daily  clopidogrel (PLAVIX) tablet 75 mg, Daily  [Held by provider] gabapentin (NEURONTIN) capsule 100 mg, TID  latanoprost (XALATAN) 0.005 % ophthalmic solution 1 drop, Nightly  pantoprazole (PROTONIX) tablet 20 mg, Daily  rOPINIRole (REQUIP) tablet 0.5 mg, TID  tamsulosin (FLOMAX) capsule 0.4 mg, Daily  sodium chloride flush 0.9 % injection 5-40 mL, 2 times per day  sodium chloride flush 0.9 % injection 5-40 mL, PRN  0.9 % sodium chloride infusion, PRN  enoxaparin (LOVENOX) injection 40 mg, Daily  acetaminophen (TYLENOL) tablet 650 mg, Q4H PRN  ondansetron (ZOFRAN-ODT) disintegrating tablet 4 mg, Q8H PRN   Or  ondansetron (ZOFRAN) injection 4 mg, Q6H PRN  carbidopa-levodopa (SINEMET)  MG per tablet 2 tablet, TID   And  entacapone (COMTAN) tablet 200 mg, TID        All medication charted and reviewed.    CONSULTS   intended.

## 2025-05-09 PROCEDURE — 6370000000 HC RX 637 (ALT 250 FOR IP)

## 2025-05-09 PROCEDURE — 1200000000 HC SEMI PRIVATE

## 2025-05-09 PROCEDURE — 2500000003 HC RX 250 WO HCPCS

## 2025-05-09 PROCEDURE — 6370000000 HC RX 637 (ALT 250 FOR IP): Performed by: PSYCHIATRY & NEUROLOGY

## 2025-05-09 PROCEDURE — 6360000002 HC RX W HCPCS

## 2025-05-09 RX ORDER — BUTALBITAL, ACETAMINOPHEN AND CAFFEINE 50; 325; 40 MG/1; MG/1; MG/1
1 TABLET ORAL ONCE
Status: COMPLETED | OUTPATIENT
Start: 2025-05-09 | End: 2025-05-09

## 2025-05-09 RX ORDER — BUTALBITAL, ACETAMINOPHEN AND CAFFEINE 50; 325; 40 MG/1; MG/1; MG/1
1 TABLET ORAL EVERY 6 HOURS PRN
Status: DISCONTINUED | OUTPATIENT
Start: 2025-05-09 | End: 2025-05-12 | Stop reason: HOSPADM

## 2025-05-09 RX ORDER — POLYETHYLENE GLYCOL 3350 17 G/17G
17 POWDER, FOR SOLUTION ORAL DAILY
Status: DISCONTINUED | OUTPATIENT
Start: 2025-05-09 | End: 2025-05-12 | Stop reason: HOSPADM

## 2025-05-09 RX ADMIN — MIDODRINE HYDROCHLORIDE 5 MG: 5 TABLET ORAL at 13:35

## 2025-05-09 RX ADMIN — LATANOPROST 1 DROP: 50 SOLUTION OPHTHALMIC at 20:17

## 2025-05-09 RX ADMIN — TAMSULOSIN HYDROCHLORIDE 0.4 MG: 0.4 CAPSULE ORAL at 08:46

## 2025-05-09 RX ADMIN — CLOPIDOGREL BISULFATE 75 MG: 75 TABLET, FILM COATED ORAL at 08:46

## 2025-05-09 RX ADMIN — CARBIDOPA AND LEVODOPA 2 TABLET: 25; 100 TABLET ORAL at 21:42

## 2025-05-09 RX ADMIN — ACETAMINOPHEN 650 MG: 325 TABLET ORAL at 08:46

## 2025-05-09 RX ADMIN — CARBIDOPA AND LEVODOPA 2 TABLET: 25; 100 TABLET ORAL at 08:46

## 2025-05-09 RX ADMIN — ACETAMINOPHEN 650 MG: 325 TABLET ORAL at 04:29

## 2025-05-09 RX ADMIN — ROPINIROLE HYDROCHLORIDE 0.5 MG: 0.25 TABLET, FILM COATED ORAL at 13:35

## 2025-05-09 RX ADMIN — SODIUM CHLORIDE, PRESERVATIVE FREE 10 ML: 5 INJECTION INTRAVENOUS at 08:47

## 2025-05-09 RX ADMIN — ATORVASTATIN CALCIUM 40 MG: 40 TABLET, FILM COATED ORAL at 20:12

## 2025-05-09 RX ADMIN — PANTOPRAZOLE SODIUM 20 MG: 20 TABLET, DELAYED RELEASE ORAL at 08:46

## 2025-05-09 RX ADMIN — ENTACAPONE 200 MG: 200 TABLET, FILM COATED ORAL at 08:46

## 2025-05-09 RX ADMIN — ENTACAPONE 200 MG: 200 TABLET, FILM COATED ORAL at 13:36

## 2025-05-09 RX ADMIN — ENTACAPONE 200 MG: 200 TABLET, FILM COATED ORAL at 20:13

## 2025-05-09 RX ADMIN — ROPINIROLE HYDROCHLORIDE 0.5 MG: 0.25 TABLET, FILM COATED ORAL at 08:46

## 2025-05-09 RX ADMIN — SODIUM CHLORIDE, PRESERVATIVE FREE 10 ML: 5 INJECTION INTRAVENOUS at 20:19

## 2025-05-09 RX ADMIN — ENOXAPARIN SODIUM 40 MG: 100 INJECTION SUBCUTANEOUS at 08:47

## 2025-05-09 RX ADMIN — POLYETHYLENE GLYCOL 3350 17 G: 17 POWDER, FOR SOLUTION ORAL at 17:50

## 2025-05-09 RX ADMIN — Medication 5 MG: at 20:12

## 2025-05-09 RX ADMIN — BUTALBITAL, ACETAMINOPHEN, AND CAFFEINE 1 TABLET: 50; 325; 40 TABLET ORAL at 13:35

## 2025-05-09 RX ADMIN — ROPINIROLE HYDROCHLORIDE 0.5 MG: 0.25 TABLET, FILM COATED ORAL at 20:15

## 2025-05-09 RX ADMIN — CARBIDOPA AND LEVODOPA 2 TABLET: 25; 100 TABLET ORAL at 13:35

## 2025-05-09 ASSESSMENT — PAIN DESCRIPTION - DESCRIPTORS
DESCRIPTORS: ACHING

## 2025-05-09 ASSESSMENT — PAIN SCALES - GENERAL
PAINLEVEL_OUTOF10: 3
PAINLEVEL_OUTOF10: 8
PAINLEVEL_OUTOF10: 6
PAINLEVEL_OUTOF10: 6

## 2025-05-09 ASSESSMENT — PAIN DESCRIPTION - LOCATION
LOCATION: HEAD

## 2025-05-09 ASSESSMENT — PAIN DESCRIPTION - ORIENTATION: ORIENTATION: ANTERIOR

## 2025-05-09 NOTE — PROGRESS NOTES
Spiritual Health History and Assessment/Progress Note  [facility]    (P) Initial Encounter,  ,  ,      Name: Mina Gill MRN: [id number]    Age: 84 y.o.     Sex: male   Language: English   Amish: Restorationist   Chest pain     Date: 5/8/2025            Total Time Calculated: (P) 15 min              Spiritual Assessment began in [facility] STE[facility]        Referral/Consult From: (P) Rounding   Encounter Overview/Reason: (P) Initial Encounter  Service Provided For: (P) Patient    Melissa, Belief, Meaning:   Patient is connected with a melissa tradition or spiritual practice. Patient expressed gratitude for spiritual care and sacraments while at [facility].  Family/Friends No family/friends present      Importance and Influence:  Patient has no beliefs influential to healthcare decision-making identified during this visit  Family/Friends No family/friends present    Community:  Patient feels well-supported. Support system includes: Other: Family  Family/Friends No family/friends present    Assessment and Plan of Care:     Patient Interventions include: Other:  provided a supportive presence through active listening and words of affirmation.  Family/Friends Interventions include: No family/friends present    Patient Plan of Care: Spiritual Care available upon further referral  Family/Friends Plan of Care: No family/friends present    Electronically signed by Chaplain Mahad on 5/8/2025 at 9:50 PM

## 2025-05-09 NOTE — PROGRESS NOTES
Comprehensive Nutrition Assessment    Type and Reason for Visit:  Initial, LOS    Nutrition Recommendations/Plan:   Continue current diet + high kcal/high PRO ONS TID  Consider bowel regimen 2/2 no documented BM x 6 days  Will monitor PO/ONS intake, wt, GI status, and POC     Malnutrition Assessment:  Malnutrition Status:  No malnutrition (05/09/25 0912)    Context:  Acute Illness     Findings of the 6 clinical characteristics of malnutrition:  Energy Intake:  No decrease in energy intake  Weight Loss:  Mild weight loss     Body Fat Loss:  No body fat loss     Muscle Mass Loss:  No muscle mass loss    Fluid Accumulation:  Moderate to Severe Extremities   Strength:  Not Performed    Nutrition Assessment:    84 y.o.M admitted d/t hypotension, fatigue. Pt assessed for LOS. PO intake reported as % of meals. Pt reports doing \"okay\" w/ eating. RD inquired about difficulty eating d/t tremors, pt denies. Pt reports liking Ensure, requesting anna or strawberry. Per chart, wt appears stable x 4 mo, 1 yr but clinically insignificant wt loss x 2 mo (5.3%) if wts are accurate. No significant muscle/fat loss observed at this time. RD will continue to monitor per protocol.    Nutrition Related Findings:    LBM on 5/3--consider bowel regimen. +1, pitting BLE edema. Labs/Meds reviewed. Wound Type:  (wound to toe)       Current Nutrition Intake & Therapies:    Average Meal Intake: %  Average Supplements Intake: 51-75%  ADULT ORAL NUTRITION SUPPLEMENT; Breakfast, Lunch, Dinner; Standard High Calorie/High Protein Oral Supplement  ADULT DIET; Regular; Anna or strawberry Ensure    Anthropometric Measures:  Height: 170.2 cm (5' 7.01\")  Ideal Body Weight (IBW): 148 lbs (67 kg)    Current Body Weight: 81.6 kg (179 lb 14.3 oz), 121.6 % IBW.    Current BMI (kg/m2): 28.2    Estimated Daily Nutrient Needs:  Energy Requirements Based On: Formula  Weight Used for Energy Requirements: Current  Energy (kcal/day): 1385-7354

## 2025-05-09 NOTE — PLAN OF CARE
Problem: Discharge Planning  Goal: Discharge to home or other facility with appropriate resources  5/8/2025 2155 by Anthony Son RN  Outcome: Progressing     Problem: Safety - Adult  Goal: Free from fall injury  5/8/2025 2155 by Anthony Son RN  Outcome: Progressing     Problem: Chronic Conditions and Co-morbidities  Goal: Patient's chronic conditions and co-morbidity symptoms are monitored and maintained or improved  5/8/2025 2155 by Anthony Son RN  Outcome: Progressing     Problem: Pain  Goal: Verbalizes/displays adequate comfort level or baseline comfort level  5/8/2025 2155 by Anthony Son RN  Outcome: Progressing     Problem: Neurosensory - Adult  Goal: Achieves stable or improved neurological status  5/8/2025 2155 by Anthony Son RN  Outcome: Progressing     Problem: Cardiovascular - Adult  Goal: Maintains optimal cardiac output and hemodynamic stability  5/8/2025 2155 by Anthony Son RN  Outcome: Progressing     Problem: Musculoskeletal - Adult  Goal: Return mobility to safest level of function  5/8/2025 2155 by Anthony Son RN  Outcome: Progressing     Problem: Infection - Adult  Goal: Absence of infection at discharge  5/8/2025 2155 by Anthony Son RN  Outcome: Progressing

## 2025-05-09 NOTE — PROGRESS NOTES
OBS/CDU   Progress NOTE      Patients PCP is:  Tisha Glasgow MD        SUBJECTIVE      Patient evaluated in the bedside chair this morning. Complaining of a mild headache this morning that is no different than his usual headaches.  Patient is trying home remedy of a washcloth over the head for improvement.  Will offer Fioricet for symptomatic relief.    PHYSICAL EXAM      General: NAD, AO X 3  Heent: EOMI, PERRL  Neck: SUPPLE, NO JVD  Cardiovascular: RRR, S1S2  Pulmonary: CTAB, NO SOB  Abdomen: SOFT, NTTP, ND, +BS  Extremities: +2/4 PULSES DISTAL, NO SWELLING  Neuro / Psych: NO NUMBNESS OR TINGLING, MENTATION AT BASELINE    PERTINENT TEST /EXAMS      I have reviewed all available laboratory results.    MEDICATIONS CURRENT   Ubrogepant TABS 50 mg, Q48H  melatonin tablet 5 mg, Nightly  sodium chloride flush 0.9 % injection 10 mL, PRN  sodium chloride flush 0.9 % injection 10 mL, PRN  midodrine (PROAMATINE) tablet 5 mg, TID WC  atorvastatin (LIPITOR) tablet 40 mg, Daily  clopidogrel (PLAVIX) tablet 75 mg, Daily  [Held by provider] gabapentin (NEURONTIN) capsule 100 mg, TID  latanoprost (XALATAN) 0.005 % ophthalmic solution 1 drop, Nightly  pantoprazole (PROTONIX) tablet 20 mg, Daily  rOPINIRole (REQUIP) tablet 0.5 mg, TID  tamsulosin (FLOMAX) capsule 0.4 mg, Daily  sodium chloride flush 0.9 % injection 5-40 mL, 2 times per day  sodium chloride flush 0.9 % injection 5-40 mL, PRN  0.9 % sodium chloride infusion, PRN  enoxaparin (LOVENOX) injection 40 mg, Daily  acetaminophen (TYLENOL) tablet 650 mg, Q4H PRN  ondansetron (ZOFRAN-ODT) disintegrating tablet 4 mg, Q8H PRN   Or  ondansetron (ZOFRAN) injection 4 mg, Q6H PRN  carbidopa-levodopa (SINEMET)  MG per tablet 2 tablet, TID   And  entacapone (COMTAN) tablet 200 mg, TID        All medication charted and reviewed.    CONSULTS      IP CONSULT TO CARDIOLOGY  IP CONSULT TO NEUROLOGY  IP CONSULT TO PHYSICAL MEDICINE REHAB  IP CONSULT TO VASCULAR ACCESS

## 2025-05-09 NOTE — PLAN OF CARE
Problem: Discharge Planning  Goal: Discharge to home or other facility with appropriate resources  5/9/2025 1348 by Saad Torres RN  Outcome: Progressing  5/9/2025 0616 by Leslie Frazier RN  Outcome: Progressing     Problem: Safety - Adult  Goal: Free from fall injury  5/9/2025 1348 by Saad Torres RN  Outcome: Progressing  Flowsheets (Taken 5/9/2025 0730)  Free From Fall Injury: Instruct family/caregiver on patient safety  5/9/2025 0616 by Leslie Frazier RN  Outcome: Progressing     Problem: Chronic Conditions and Co-morbidities  Goal: Patient's chronic conditions and co-morbidity symptoms are monitored and maintained or improved  5/9/2025 1348 by Saad Torres RN  Outcome: Progressing  5/9/2025 0616 by Leslie Frazier RN  Outcome: Progressing     Problem: Pain  Goal: Verbalizes/displays adequate comfort level or baseline comfort level  5/9/2025 1348 by Saad Torres RN  Outcome: Progressing  5/9/2025 0616 by Leslie Frazier RN  Outcome: Progressing     Problem: Neurosensory - Adult  Goal: Achieves stable or improved neurological status  5/9/2025 1348 by Saad Torres RN  Outcome: Progressing  5/9/2025 0616 by Leslie Frazier RN  Outcome: Progressing  Goal: Absence of seizures  5/9/2025 1348 by Saad Torres RN  Outcome: Progressing  5/9/2025 0616 by Leslie Frazier RN  Outcome: Progressing  Goal: Remains free of injury related to seizures activity  5/9/2025 1348 by Saad Torres RN  Outcome: Progressing  5/9/2025 0616 by Leslie Frazier RN  Outcome: Progressing  Goal: Achieves maximal functionality and self care  5/9/2025 1348 by Saad Torres RN  Outcome: Progressing  5/9/2025 0616 by Leslie Frazier RN  Outcome: Progressing     Problem: Musculoskeletal - Adult  Goal: Return mobility to safest level of function  5/9/2025 1348 by Saad Torres RN  Outcome: Progressing  5/9/2025 0616 by Leslie Frazier RN  Outcome: Progressing  Goal: Maintain proper alignment of affected

## 2025-05-09 NOTE — PLAN OF CARE
Problem: Discharge Planning  Goal: Discharge to home or other facility with appropriate resources  5/9/2025 0616 by Leslie Frazier RN  Outcome: Progressing  5/8/2025 2155 by Anthony Son RN  Outcome: Progressing  5/8/2025 1729 by Corina Franklin RN  Outcome: Progressing     Problem: Safety - Adult  Goal: Free from fall injury  5/9/2025 0616 by Leslie Frazier RN  Outcome: Progressing  5/8/2025 2155 by Anthony Son RN  Outcome: Progressing  5/8/2025 1729 by Corina Franklin RN  Outcome: Progressing     Problem: Chronic Conditions and Co-morbidities  Goal: Patient's chronic conditions and co-morbidity symptoms are monitored and maintained or improved  5/9/2025 0616 by Leslie Frazier RN  Outcome: Progressing  5/8/2025 2155 by Anthony Son RN  Outcome: Progressing  5/8/2025 1729 by Corina Franklin RN  Outcome: Progressing     Problem: Pain  Goal: Verbalizes/displays adequate comfort level or baseline comfort level  5/9/2025 0616 by Leslie Frazier RN  Outcome: Progressing  5/8/2025 2155 by Anthony Son RN  Outcome: Progressing  5/8/2025 1729 by Corina Franklin RN  Outcome: Progressing     Problem: Neurosensory - Adult  Goal: Achieves stable or improved neurological status  5/9/2025 0616 by Leslie Frazier RN  Outcome: Progressing  5/8/2025 2155 by Anthony Son RN  Outcome: Progressing  5/8/2025 1729 by Corina Franklin RN  Outcome: Progressing  Goal: Absence of seizures  5/9/2025 0616 by Leslie Frazier RN  Outcome: Progressing  5/8/2025 1729 by Corina Franklin RN  Outcome: Progressing  Goal: Remains free of injury related to seizures activity  5/9/2025 0616 by Leslie Frazier RN  Outcome: Progressing  5/8/2025 1729 by Corina Franklin RN  Outcome: Progressing  Goal: Achieves maximal functionality and self care  5/9/2025 0616 by Leslie Frazier RN  Outcome: Progressing  5/8/2025 1729 by Corina Franklin RN  Outcome: Progressing     Problem: Cardiovascular - Adult  Goal:

## 2025-05-09 NOTE — CARE COORDINATION
Case Management   Daily Progress Note       Patient Name: Mina Gill                   YOB: 1940  Diagnosis: Chest pain [R07.9]                       GMLOS: 1.7 days  Length of Stay: 6  days    Anticipated Discharge Date: Longer than expected LOS    Readmission Risk (Low < 19, Mod (19-27), High > 27): Readmission Risk Score: 21.7        Current Transitional Plan    [] Home Independently    [x] Home with HC    [x] Skilled Nursing Facility    [] Acute Rehabilitation    [] Long Term Acute Care (LTAC)    [] Other:     Plan for the Stay (Medical Management) :          Workflow Continuation (Additional Notes) :  0910 talked with patient-he states he doesn't feel well, doesn't think he can go home. Asked me to call his son Delvin. 0920 called and talked with son Delvin-he is not wanting long term for his father. Explained to him about the denial and peer to peer for [facility]. He said if officially denied-he can take him home-he lives with Lora and Dana. Would like home care-[facility]. 0930 faxed referral to [facility] and called to [facility].    Call from Dr. Kirby-could not complete peer to peer today. Complete on Monday.      Kristina Garcia, RN  May 9, 2025

## 2025-05-09 NOTE — PROGRESS NOTES
[facility]  CDU / OBSERVATION ENCOUNTER  ATTENDING NOTE         I performed a history and physical examination of the patient and discussed management with the resident or midlevel provider. I reviewed the resident or midlevel provider's note and agree with the documented findings and plan of care. Any areas of disagreement are noted on the chart. I was personally present for the key portions of any procedures. I have documented in the chart those procedures where I was not present during the key portions. I have reviewed the nurses notes. I agree with the chief complaint, past medical history, past surgical history, allergies, medications, social and family history as documented unless otherwise noted below.    The Family history, social history, and ROS are effectively unchanged since admission unless noted elsewhere in the chart.     This patient was placed in the observation unit for reevaluation for possible admission to the hospital     Patient with complaints of headache similar to prior headaches today.  Treated with Tylenol.  Patient is reasonably comfortable.    Patient's latest AM-PAC score is 17.  Patient not able to handle himself at home.  Concern for fall risk and inability to perform ADLs.  Patient accepted at [facility].  Preapproval pending.    Attempting to get preapproval through [id number]       Frank Kirby MD  Attending Emergency  Physician

## 2025-05-10 PROCEDURE — 6370000000 HC RX 637 (ALT 250 FOR IP): Performed by: PSYCHIATRY & NEUROLOGY

## 2025-05-10 PROCEDURE — 6360000002 HC RX W HCPCS

## 2025-05-10 PROCEDURE — 1200000000 HC SEMI PRIVATE

## 2025-05-10 PROCEDURE — 6370000000 HC RX 637 (ALT 250 FOR IP): Performed by: LICENSED PRACTICAL NURSE

## 2025-05-10 PROCEDURE — 6370000000 HC RX 637 (ALT 250 FOR IP)

## 2025-05-10 PROCEDURE — 2500000003 HC RX 250 WO HCPCS

## 2025-05-10 RX ORDER — BUTALBITAL, ACETAMINOPHEN AND CAFFEINE 50; 325; 40 MG/1; MG/1; MG/1
1 TABLET ORAL 2 TIMES DAILY PRN
Status: DISCONTINUED | OUTPATIENT
Start: 2025-05-10 | End: 2025-05-10

## 2025-05-10 RX ADMIN — ROPINIROLE HYDROCHLORIDE 0.5 MG: 0.25 TABLET, FILM COATED ORAL at 21:56

## 2025-05-10 RX ADMIN — MIDODRINE HYDROCHLORIDE 5 MG: 5 TABLET ORAL at 17:44

## 2025-05-10 RX ADMIN — CLOPIDOGREL BISULFATE 75 MG: 75 TABLET, FILM COATED ORAL at 08:22

## 2025-05-10 RX ADMIN — LATANOPROST 1 DROP: 50 SOLUTION OPHTHALMIC at 21:57

## 2025-05-10 RX ADMIN — ATORVASTATIN CALCIUM 40 MG: 40 TABLET, FILM COATED ORAL at 21:04

## 2025-05-10 RX ADMIN — POLYETHYLENE GLYCOL 3350 17 G: 17 POWDER, FOR SOLUTION ORAL at 08:23

## 2025-05-10 RX ADMIN — ROPINIROLE HYDROCHLORIDE 0.5 MG: 0.25 TABLET, FILM COATED ORAL at 12:52

## 2025-05-10 RX ADMIN — CARBIDOPA AND LEVODOPA 2 TABLET: 25; 100 TABLET ORAL at 08:22

## 2025-05-10 RX ADMIN — BUTALBITAL, ACETAMINOPHEN, AND CAFFEINE 1 TABLET: 50; 325; 40 TABLET ORAL at 08:22

## 2025-05-10 RX ADMIN — TAMSULOSIN HYDROCHLORIDE 0.4 MG: 0.4 CAPSULE ORAL at 08:23

## 2025-05-10 RX ADMIN — SODIUM CHLORIDE, PRESERVATIVE FREE 10 ML: 5 INJECTION INTRAVENOUS at 08:23

## 2025-05-10 RX ADMIN — ENTACAPONE 200 MG: 200 TABLET, FILM COATED ORAL at 21:04

## 2025-05-10 RX ADMIN — CARBIDOPA AND LEVODOPA 2 TABLET: 25; 100 TABLET ORAL at 12:52

## 2025-05-10 RX ADMIN — PANTOPRAZOLE SODIUM 20 MG: 20 TABLET, DELAYED RELEASE ORAL at 08:22

## 2025-05-10 RX ADMIN — ENTACAPONE 200 MG: 200 TABLET, FILM COATED ORAL at 12:52

## 2025-05-10 RX ADMIN — CARBIDOPA AND LEVODOPA 2 TABLET: 25; 100 TABLET ORAL at 21:04

## 2025-05-10 RX ADMIN — ENTACAPONE 200 MG: 200 TABLET, FILM COATED ORAL at 08:22

## 2025-05-10 RX ADMIN — SODIUM CHLORIDE, PRESERVATIVE FREE 10 ML: 5 INJECTION INTRAVENOUS at 22:56

## 2025-05-10 RX ADMIN — Medication 5 MG: at 21:04

## 2025-05-10 RX ADMIN — MIDODRINE HYDROCHLORIDE 5 MG: 5 TABLET ORAL at 12:52

## 2025-05-10 RX ADMIN — ENOXAPARIN SODIUM 40 MG: 100 INJECTION SUBCUTANEOUS at 08:22

## 2025-05-10 RX ADMIN — ROPINIROLE HYDROCHLORIDE 0.5 MG: 0.25 TABLET, FILM COATED ORAL at 08:22

## 2025-05-10 ASSESSMENT — PAIN SCALES - GENERAL
PAINLEVEL_OUTOF10: 0
PAINLEVEL_OUTOF10: 8
PAINLEVEL_OUTOF10: 2

## 2025-05-10 ASSESSMENT — PAIN DESCRIPTION - DESCRIPTORS: DESCRIPTORS: ACHING

## 2025-05-10 ASSESSMENT — PAIN DESCRIPTION - LOCATION
LOCATION: HEAD
LOCATION: HEAD

## 2025-05-10 NOTE — PROGRESS NOTES
[facility]  CDU / OBSERVATION ENCOUNTER  ATTENDING NOTE         I performed a history and physical examination of the patient and discussed management with the resident or midlevel provider. I reviewed the resident or midlevel provider's note and agree with the documented findings and plan of care. Any areas of disagreement are noted on the chart. I was personally present for the key portions of any procedures. I have documented in the chart those procedures where I was not present during the key portions. I have reviewed the nurses notes. I agree with the chief complaint, past medical history, past surgical history, allergies, medications, social and family history as documented unless otherwise noted below.    The Family history, social history, and ROS are effectively unchanged since admission unless noted elsewhere in the chart.     This patient was placed in the observation unit for reevaluation for possible admission to the hospital     Patient with headache as yesterday but resolved with analgesics.  Patient neurologically intact.  No new symptoms otherwise.  Patient has peer to peer pending for Monday.  Patient states he is ready to move onto the next phase of care       Frank Kirby MD  Attending Emergency  Physician

## 2025-05-10 NOTE — PROGRESS NOTES
OBS/CDU   Progress NOTE      Patients PCP is:  Tisha Glasgow MD        SUBJECTIVE      Patient evaluated in the bedside chair this morning.  Mildly more conversational.  Complaining of a mild headache this morning that is no different than his usual headaches.  Patient is trying home remedy of a washcloth over the head for improvement.     PHYSICAL EXAM      General: NAD, AO X 3  Heent: EOMI, PERRL  Neck: SUPPLE, NO JVD  Cardiovascular: RRR, S1S2  Pulmonary: CTAB, NO SOB  Abdomen: SOFT, NTTP, ND, +BS  Extremities: +2/4 PULSES DISTAL, NO SWELLING  Neuro / Psych: NO NUMBNESS OR TINGLING, MENTATION AT BASELINE    PERTINENT TEST /EXAMS      I have reviewed all available laboratory results.    MEDICATIONS CURRENT   polyethylene glycol (GLYCOLAX) packet 17 g, Daily  butalbital-acetaminophen-caffeine (FIORICET, ESGIC) per tablet 1 tablet, Q6H PRN  Ubrogepant TABS 50 mg, Q48H  melatonin tablet 5 mg, Nightly  sodium chloride flush 0.9 % injection 10 mL, PRN  sodium chloride flush 0.9 % injection 10 mL, PRN  midodrine (PROAMATINE) tablet 5 mg, TID WC  atorvastatin (LIPITOR) tablet 40 mg, Daily  clopidogrel (PLAVIX) tablet 75 mg, Daily  [Held by provider] gabapentin (NEURONTIN) capsule 100 mg, TID  latanoprost (XALATAN) 0.005 % ophthalmic solution 1 drop, Nightly  pantoprazole (PROTONIX) tablet 20 mg, Daily  rOPINIRole (REQUIP) tablet 0.5 mg, TID  tamsulosin (FLOMAX) capsule 0.4 mg, Daily  sodium chloride flush 0.9 % injection 5-40 mL, 2 times per day  sodium chloride flush 0.9 % injection 5-40 mL, PRN  0.9 % sodium chloride infusion, PRN  enoxaparin (LOVENOX) injection 40 mg, Daily  acetaminophen (TYLENOL) tablet 650 mg, Q4H PRN  ondansetron (ZOFRAN-ODT) disintegrating tablet 4 mg, Q8H PRN   Or  ondansetron (ZOFRAN) injection 4 mg, Q6H PRN  carbidopa-levodopa (SINEMET)  MG per tablet 2 tablet, TID   And  entacapone (COMTAN) tablet 200 mg, TID        All medication charted and reviewed.    CONSULTS      IP CONSULT TO

## 2025-05-10 NOTE — PLAN OF CARE
Problem: Discharge Planning  Goal: Discharge to home or other facility with appropriate resources  5/10/2025 1142 by Saad Torres RN  Outcome: Progressing  5/10/2025 0617 by Rose Howell RN  Outcome: Progressing     Problem: Safety - Adult  Goal: Free from fall injury  5/10/2025 1142 by Saad Torres RN  Outcome: Progressing  5/10/2025 0617 by Rose Howell RN  Outcome: Progressing     Problem: Chronic Conditions and Co-morbidities  Goal: Patient's chronic conditions and co-morbidity symptoms are monitored and maintained or improved  5/10/2025 1142 by Saad Torres RN  Outcome: Progressing  5/10/2025 0617 by Rose Howell RN  Outcome: Progressing     Problem: Pain  Goal: Verbalizes/displays adequate comfort level or baseline comfort level  5/10/2025 1142 by Saad Torres RN  Outcome: Progressing  5/10/2025 0617 by Rose Howell RN  Outcome: Progressing     Problem: Neurosensory - Adult  Goal: Achieves stable or improved neurological status  5/10/2025 1142 by Saad Torres RN  Outcome: Progressing  5/10/2025 0617 by Rose Howell RN  Outcome: Progressing  Goal: Absence of seizures  5/10/2025 1142 by Saad Torres RN  Outcome: Progressing  5/10/2025 0617 by Rose Howell RN  Outcome: Progressing  Goal: Remains free of injury related to seizures activity  5/10/2025 1142 by Saad Torres RN  Outcome: Progressing  5/10/2025 0617 by Rose Howell RN  Outcome: Progressing  Goal: Achieves maximal functionality and self care  5/10/2025 1142 by Saad Torres RN  Outcome: Progressing  5/10/2025 0617 by Rose Howell RN  Outcome: Progressing     Problem: Cardiovascular - Adult  Goal: Maintains optimal cardiac output and hemodynamic stability  5/10/2025 1142 by Saad Torres RN  Outcome: Progressing  5/10/2025 0617 by Rose Howell RN  Outcome: Progressing  Goal: Absence of cardiac dysrhythmias or at baseline  5/10/2025 1142 by Saad Torres RN  Outcome: Progressing  5/10/2025 0617 by Dante

## 2025-05-10 NOTE — PLAN OF CARE
Problem: Discharge Planning  Goal: Discharge to home or other facility with appropriate resources  Outcome: Progressing     Problem: Safety - Adult  Goal: Free from fall injury  Outcome: Progressing     Problem: Chronic Conditions and Co-morbidities  Goal: Patient's chronic conditions and co-morbidity symptoms are monitored and maintained or improved  Outcome: Progressing     Problem: Pain  Goal: Verbalizes/displays adequate comfort level or baseline comfort level  Outcome: Progressing     Problem: Neurosensory - Adult  Goal: Achieves stable or improved neurological status  Outcome: Progressing  Goal: Absence of seizures  Outcome: Progressing  Goal: Remains free of injury related to seizures activity  Outcome: Progressing  Goal: Achieves maximal functionality and self care  Outcome: Progressing     Problem: Cardiovascular - Adult  Goal: Maintains optimal cardiac output and hemodynamic stability  Outcome: Progressing  Goal: Absence of cardiac dysrhythmias or at baseline  Outcome: Progressing     Problem: Musculoskeletal - Adult  Goal: Return mobility to safest level of function  Outcome: Progressing  Goal: Maintain proper alignment of affected body part  Outcome: Progressing  Goal: Return ADL status to a safe level of function  Outcome: Progressing     Problem: Infection - Adult  Goal: Absence of infection at discharge  Outcome: Progressing  Goal: Absence of infection during hospitalization  Outcome: Progressing  Goal: Absence of fever/infection during anticipated neutropenic period  Outcome: Progressing     Problem: ABCDS Injury Assessment  Goal: Absence of physical injury  Outcome: Progressing

## 2025-05-11 PROCEDURE — 97530 THERAPEUTIC ACTIVITIES: CPT

## 2025-05-11 PROCEDURE — 6370000000 HC RX 637 (ALT 250 FOR IP): Performed by: PSYCHIATRY & NEUROLOGY

## 2025-05-11 PROCEDURE — 97110 THERAPEUTIC EXERCISES: CPT

## 2025-05-11 PROCEDURE — 1200000000 HC SEMI PRIVATE

## 2025-05-11 PROCEDURE — 6370000000 HC RX 637 (ALT 250 FOR IP): Performed by: LICENSED PRACTICAL NURSE

## 2025-05-11 PROCEDURE — 2500000003 HC RX 250 WO HCPCS

## 2025-05-11 PROCEDURE — 97116 GAIT TRAINING THERAPY: CPT

## 2025-05-11 PROCEDURE — 6370000000 HC RX 637 (ALT 250 FOR IP)

## 2025-05-11 PROCEDURE — 6360000002 HC RX W HCPCS

## 2025-05-11 RX ADMIN — CLOPIDOGREL BISULFATE 75 MG: 75 TABLET, FILM COATED ORAL at 08:12

## 2025-05-11 RX ADMIN — Medication 5 MG: at 19:41

## 2025-05-11 RX ADMIN — MIDODRINE HYDROCHLORIDE 5 MG: 5 TABLET ORAL at 13:51

## 2025-05-11 RX ADMIN — ROPINIROLE HYDROCHLORIDE 0.5 MG: 0.25 TABLET, FILM COATED ORAL at 08:12

## 2025-05-11 RX ADMIN — PANTOPRAZOLE SODIUM 20 MG: 20 TABLET, DELAYED RELEASE ORAL at 08:12

## 2025-05-11 RX ADMIN — ENTACAPONE 200 MG: 200 TABLET, FILM COATED ORAL at 13:52

## 2025-05-11 RX ADMIN — POLYETHYLENE GLYCOL 3350 17 G: 17 POWDER, FOR SOLUTION ORAL at 08:12

## 2025-05-11 RX ADMIN — ROPINIROLE HYDROCHLORIDE 0.5 MG: 0.25 TABLET, FILM COATED ORAL at 13:51

## 2025-05-11 RX ADMIN — CARBIDOPA AND LEVODOPA 2 TABLET: 25; 100 TABLET ORAL at 08:12

## 2025-05-11 RX ADMIN — BUTALBITAL, ACETAMINOPHEN, AND CAFFEINE 1 TABLET: 50; 325; 40 TABLET ORAL at 08:12

## 2025-05-11 RX ADMIN — ENOXAPARIN SODIUM 40 MG: 100 INJECTION SUBCUTANEOUS at 08:11

## 2025-05-11 RX ADMIN — TAMSULOSIN HYDROCHLORIDE 0.4 MG: 0.4 CAPSULE ORAL at 08:12

## 2025-05-11 RX ADMIN — ATORVASTATIN CALCIUM 40 MG: 40 TABLET, FILM COATED ORAL at 19:41

## 2025-05-11 RX ADMIN — ACETAMINOPHEN 650 MG: 325 TABLET ORAL at 19:41

## 2025-05-11 RX ADMIN — CARBIDOPA AND LEVODOPA 2 TABLET: 25; 100 TABLET ORAL at 13:51

## 2025-05-11 RX ADMIN — ROPINIROLE HYDROCHLORIDE 0.5 MG: 0.25 TABLET, FILM COATED ORAL at 19:41

## 2025-05-11 RX ADMIN — MIDODRINE HYDROCHLORIDE 5 MG: 5 TABLET ORAL at 16:29

## 2025-05-11 RX ADMIN — ENTACAPONE 200 MG: 200 TABLET, FILM COATED ORAL at 19:41

## 2025-05-11 RX ADMIN — LATANOPROST 1 DROP: 50 SOLUTION OPHTHALMIC at 19:46

## 2025-05-11 RX ADMIN — ENTACAPONE 200 MG: 200 TABLET, FILM COATED ORAL at 08:12

## 2025-05-11 RX ADMIN — SODIUM CHLORIDE, PRESERVATIVE FREE 10 ML: 5 INJECTION INTRAVENOUS at 19:45

## 2025-05-11 RX ADMIN — CARBIDOPA AND LEVODOPA 2 TABLET: 25; 100 TABLET ORAL at 19:41

## 2025-05-11 RX ADMIN — SODIUM CHLORIDE, PRESERVATIVE FREE 10 ML: 5 INJECTION INTRAVENOUS at 08:11

## 2025-05-11 RX ADMIN — MIDODRINE HYDROCHLORIDE 5 MG: 5 TABLET ORAL at 08:11

## 2025-05-11 ASSESSMENT — PAIN SCALES - GENERAL
PAINLEVEL_OUTOF10: 0
PAINLEVEL_OUTOF10: 2
PAINLEVEL_OUTOF10: 7

## 2025-05-11 ASSESSMENT — PAIN DESCRIPTION - LOCATION: LOCATION: HEAD

## 2025-05-11 NOTE — PROGRESS NOTES
[facility]  CDU / OBSERVATION ENCOUNTER  ATTENDING NOTE         I agree with the chief complaint, past medical history, past surgical history, allergies, medications, social and family history as documented unless otherwise noted below.    The Family history, social history, and ROS are effectively unchanged since admission unless noted elsewhere in the chart.     This patient was placed in the observation unit for reevaluation for possible admission to the hospital     Patient with no new complaint today.  Awaiting preapproval.  For peer to peer review tomorrow       Frank Kirby MD  Attending Emergency  Physician

## 2025-05-11 NOTE — PROGRESS NOTES
Physical Therapy  Facility/Department: [address] STEPDOWN   Physical Therapy Daily Treatment Note    Patient Name: Mina Gill        MRN: [id number]    : 1940    Date of Service: 2025    Chief Complaint   Patient presents with    Chest Pain     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 (HCC), CKD (chronic kidney disease) stage 2, GFR 60-89 ml/min, CKD (chronic kidney disease) stage 3, GFR 30-59 ml/min (HCC), Diverticulosis of colon, GERD (gastroesophageal reflux disease), History of non-ST elevation myocardial infarction (NSTEMI) 2022, Impaired renal function, MRSA (methicillin resistant staph aureus) culture positive, Osteoarthritis of knee, Parkinson disease (HCC), Proteinuria, Skull fracture (HCC), Temporary loss of eyesight, and Tubular adenoma of colon.  Past Surgical History:  has a past surgical history that includes Colonoscopy (2012); shoulder surgery (Right); pr colsc flx w/rmvl of tumor polyp lesion snare tq (N/A, 2017); Colonoscopy (2017); Cholecystectomy, laparoscopic (N/A, 10/29/2022); Esophagogastroduodenoscopy (2023); Upper gastrointestinal endoscopy (N/A, 2023); Upper gastrointestinal endoscopy (N/A, 2023); Cardiac procedure (N/A, 2024); Cardiac procedure (N/A, 2024); and ep device procedure (N/A, 2024).    Discharge Recommendations  Discharge Recommendations: Patient would benefit from continued therapy after discharge  PT Equipment Recommendations  Equipment Needed: No (Per eval, pt owns RW, SPC, rollator and w/c)  Other: Pt owns SPC.    Assessment  Body Structures, Functions, Activity Limitations Requiring Skilled Therapeutic Intervention: Decreased functional mobility ;Decreased strength;Decreased endurance;Decreased balance  Assessment: Patient completed transfers with use of RW, CGA. Patient ambulated with use of RW, 125 total, with CGA for physical assist and Shoshana for

## 2025-05-11 NOTE — PROGRESS NOTES
OBS/CDU   Progress NOTE      Patients PCP is:  Tisha Glasgow MD        SUBJECTIVE      Patient evaluated in the bedside chair this morning.  Mildly more conversational about a workers compensation claim he had when he was younger in the hospital.  Complaining again of a mild headache this morning that is no different than his usual headaches.  Patient is trying home remedy of a washcloth over the head for improvement. Pt provided with fioricet for relief.     PHYSICAL EXAM      General: NAD, AO X 3  Heent: EOMI, PERRL  Neck: SUPPLE, NO JVD  Cardiovascular: RRR, S1S2  Pulmonary: CTAB, NO SOB  Abdomen: SOFT, NTTP, ND, +BS  Extremities: +2/4 PULSES DISTAL, NO SWELLING  Neuro / Psych: NO NUMBNESS OR TINGLING, MENTATION AT BASELINE    PERTINENT TEST /EXAMS      I have reviewed all available laboratory results.    MEDICATIONS CURRENT   polyethylene glycol (GLYCOLAX) packet 17 g, Daily  butalbital-acetaminophen-caffeine (FIORICET, ESGIC) per tablet 1 tablet, Q6H PRN  Ubrogepant TABS 50 mg, Q48H  melatonin tablet 5 mg, Nightly  sodium chloride flush 0.9 % injection 10 mL, PRN  sodium chloride flush 0.9 % injection 10 mL, PRN  midodrine (PROAMATINE) tablet 5 mg, TID WC  atorvastatin (LIPITOR) tablet 40 mg, Daily  clopidogrel (PLAVIX) tablet 75 mg, Daily  [Held by provider] gabapentin (NEURONTIN) capsule 100 mg, TID  latanoprost (XALATAN) 0.005 % ophthalmic solution 1 drop, Nightly  pantoprazole (PROTONIX) tablet 20 mg, Daily  rOPINIRole (REQUIP) tablet 0.5 mg, TID  tamsulosin (FLOMAX) capsule 0.4 mg, Daily  sodium chloride flush 0.9 % injection 5-40 mL, 2 times per day  sodium chloride flush 0.9 % injection 5-40 mL, PRN  0.9 % sodium chloride infusion, PRN  enoxaparin (LOVENOX) injection 40 mg, Daily  acetaminophen (TYLENOL) tablet 650 mg, Q4H PRN  ondansetron (ZOFRAN-ODT) disintegrating tablet 4 mg, Q8H PRN   Or  ondansetron (ZOFRAN) injection 4 mg, Q6H PRN  carbidopa-levodopa (SINEMET)  MG per tablet 2 tablet,

## 2025-05-11 NOTE — PLAN OF CARE
Problem: Discharge Planning  Goal: Discharge to home or other facility with appropriate resources  5/11/2025 1047 by Saad Torres RN  Outcome: Progressing  5/11/2025 1047 by Saad Torres RN  Outcome: Progressing  5/11/2025 0508 by Tiffany Choudhury RN  Outcome: Progressing     Problem: Safety - Adult  Goal: Free from fall injury  5/11/2025 1047 by Saad Torres RN  Outcome: Progressing  5/11/2025 1047 by Saad Torres RN  Outcome: Progressing  5/11/2025 0508 by Tiffany Choudhury RN  Outcome: Progressing     Problem: Chronic Conditions and Co-morbidities  Goal: Patient's chronic conditions and co-morbidity symptoms are monitored and maintained or improved  5/11/2025 1047 by Saad Torres RN  Outcome: Progressing  5/11/2025 1047 by Saad Torres RN  Outcome: Progressing  5/11/2025 0508 by Tiffany Choudhury RN  Outcome: Progressing     Problem: Pain  Goal: Verbalizes/displays adequate comfort level or baseline comfort level  5/11/2025 1047 by Saad Torres RN  Outcome: Progressing  5/11/2025 1047 by Saad Torres RN  Outcome: Progressing  5/11/2025 0508 by Tiffany Choudhury RN  Outcome: Progressing     Problem: Neurosensory - Adult  Goal: Achieves stable or improved neurological status  5/11/2025 1047 by Saad Torres RN  Outcome: Progressing  5/11/2025 1047 by Saad Torres RN  Outcome: Progressing  5/11/2025 0508 by Tiffany Choudhury RN  Outcome: Progressing  Goal: Absence of seizures  5/11/2025 1047 by Saad Torres RN  Outcome: Progressing  5/11/2025 1047 by Saad Torres RN  Outcome: Progressing  5/11/2025 0508 by Tiffany Choudhury RN  Outcome: Progressing  Goal: Remains free of injury related to seizures activity  5/11/2025 1047 by Saad Torres RN  Outcome: Progressing  5/11/2025 1047 by Saad Torres RN  Outcome: Progressing  5/11/2025 0508 by Tiffany Choudhury, RN  Outcome: Progressing  Goal: Achieves maximal functionality and self care  5/11/2025 1047 by Saad Torres,

## 2025-05-12 VITALS
TEMPERATURE: 98.3 F | DIASTOLIC BLOOD PRESSURE: 87 MMHG | HEART RATE: 70 BPM | SYSTOLIC BLOOD PRESSURE: 103 MMHG | WEIGHT: 180 LBS | OXYGEN SATURATION: 99 % | RESPIRATION RATE: 19 BRPM | BODY MASS INDEX: 28.25 KG/M2 | HEIGHT: 67 IN

## 2025-05-12 PROCEDURE — 6370000000 HC RX 637 (ALT 250 FOR IP): Performed by: PSYCHIATRY & NEUROLOGY

## 2025-05-12 PROCEDURE — 6360000002 HC RX W HCPCS

## 2025-05-12 PROCEDURE — 2500000003 HC RX 250 WO HCPCS

## 2025-05-12 PROCEDURE — 6370000000 HC RX 637 (ALT 250 FOR IP)

## 2025-05-12 RX ADMIN — ROPINIROLE HYDROCHLORIDE 0.5 MG: 0.25 TABLET, FILM COATED ORAL at 09:30

## 2025-05-12 RX ADMIN — SODIUM CHLORIDE, PRESERVATIVE FREE 10 ML: 5 INJECTION INTRAVENOUS at 09:00

## 2025-05-12 RX ADMIN — TAMSULOSIN HYDROCHLORIDE 0.4 MG: 0.4 CAPSULE ORAL at 09:36

## 2025-05-12 RX ADMIN — CARBIDOPA AND LEVODOPA 2 TABLET: 25; 100 TABLET ORAL at 09:26

## 2025-05-12 RX ADMIN — ENOXAPARIN SODIUM 40 MG: 100 INJECTION SUBCUTANEOUS at 09:30

## 2025-05-12 RX ADMIN — CLOPIDOGREL BISULFATE 75 MG: 75 TABLET, FILM COATED ORAL at 09:36

## 2025-05-12 RX ADMIN — PANTOPRAZOLE SODIUM 20 MG: 20 TABLET, DELAYED RELEASE ORAL at 09:35

## 2025-05-12 RX ADMIN — ENTACAPONE 200 MG: 200 TABLET, FILM COATED ORAL at 09:25

## 2025-05-12 RX ADMIN — MIDODRINE HYDROCHLORIDE 5 MG: 5 TABLET ORAL at 13:16

## 2025-05-12 RX ADMIN — ENTACAPONE 200 MG: 200 TABLET, FILM COATED ORAL at 13:16

## 2025-05-12 RX ADMIN — POLYETHYLENE GLYCOL 3350 17 G: 17 POWDER, FOR SOLUTION ORAL at 09:36

## 2025-05-12 RX ADMIN — CARBIDOPA AND LEVODOPA 2 TABLET: 25; 100 TABLET ORAL at 13:16

## 2025-05-12 RX ADMIN — MIDODRINE HYDROCHLORIDE 5 MG: 5 TABLET ORAL at 08:30

## 2025-05-12 RX ADMIN — ROPINIROLE HYDROCHLORIDE 0.5 MG: 0.25 TABLET, FILM COATED ORAL at 13:16

## 2025-05-12 NOTE — PROGRESS NOTES
OBS/CDU   Progress NOTE      Patients PCP is:  Tisha Glasgow MD        SUBJECTIVE      Patient evaluated in the bedside this morning with no acute events overnight and no new complaints. Peer to peer was had with patient's insurance who agreed to approve ECF placement to [facility].    PHYSICAL EXAM      General: NAD, AO X 3  Heent: EOMI, PERRL  Neck: SUPPLE, NO JVD  Cardiovascular: RRR, S1S2  Pulmonary: CTAB, NO SOB  Abdomen: SOFT, NTTP, ND, +BS  Extremities: +2/4 PULSES DISTAL, NO SWELLING  Neuro / Psych: NO NUMBNESS OR TINGLING, MENTATION AT BASELINE    PERTINENT TEST /EXAMS      I have reviewed all available laboratory results.    MEDICATIONS CURRENT   polyethylene glycol (GLYCOLAX) packet 17 g, Daily  butalbital-acetaminophen-caffeine (FIORICET, ESGIC) per tablet 1 tablet, Q6H PRN  Ubrogepant TABS 50 mg, Q48H  melatonin tablet 5 mg, Nightly  sodium chloride flush 0.9 % injection 10 mL, PRN  sodium chloride flush 0.9 % injection 10 mL, PRN  midodrine (PROAMATINE) tablet 5 mg, TID WC  atorvastatin (LIPITOR) tablet 40 mg, Daily  clopidogrel (PLAVIX) tablet 75 mg, Daily  [Held by provider] gabapentin (NEURONTIN) capsule 100 mg, TID  latanoprost (XALATAN) 0.005 % ophthalmic solution 1 drop, Nightly  pantoprazole (PROTONIX) tablet 20 mg, Daily  rOPINIRole (REQUIP) tablet 0.5 mg, TID  tamsulosin (FLOMAX) capsule 0.4 mg, Daily  sodium chloride flush 0.9 % injection 5-40 mL, 2 times per day  sodium chloride flush 0.9 % injection 5-40 mL, PRN  0.9 % sodium chloride infusion, PRN  enoxaparin (LOVENOX) injection 40 mg, Daily  acetaminophen (TYLENOL) tablet 650 mg, Q4H PRN  ondansetron (ZOFRAN-ODT) disintegrating tablet 4 mg, Q8H PRN   Or  ondansetron (ZOFRAN) injection 4 mg, Q6H PRN  carbidopa-levodopa (SINEMET)  MG per tablet 2 tablet, TID   And  entacapone (COMTAN) tablet 200 mg, TID        All medication charted and reviewed.    CONSULTS      IP CONSULT TO CARDIOLOGY  IP CONSULT TO NEUROLOGY  IP CONSULT TO  PHYSICAL MEDICINE REHAB  IP CONSULT TO VASCULAR ACCESS TEAM    ASSESSMENT/PLAN       Mina Gill is a 84 y.o. male who presents with hypotensive episodes in autonomic instability probably secondary to parkinsonism.  Intractable shakes secondary to Parkinson's causing interruption of daily activities    Tremor.  Patient has debilitating tremor making daily activities difficult.  Neurology is adjusting medications.  Currently signed off  Ongoing PT OT evaluation  Patient's family becoming overwhelmed with patient's symptoms.  ECF is being considered  ECF care  I believe the patient would benefit from long-term care  Family members not able to be present often enough to safely care for patient at home  Patient concerned about safety at home  Peer to peer has been scheduled but not until Friday.  Patient's insurance company is already notified intent to deny  [id number] discussion was had with attending Dr. Kirby this morning with successful agreement to approve patient for ECF stay at [facility].  Cardiac arrhythmia  Pacemaker interrogated  No V. tach V-fib  Monitor has been taking out tremor is wandering baseline mimicking cardiac arrhythmia  Chest pain  EKG unchanged  Troponins unchanged  Episodic feelings of weakness  Consider adjusting midodrine  ESR and CRP checked  Urinalysis checked  PM&R consult  Appreciate recommendations    Patient is frustrated over current health care condition.  Patient has been seen by both cardiology and neurology.  Both of signed off.    Unclear etiology of patient's symptoms.  Patient requiring ongoing supportive therapy and further investigation for reversible cause    Continue home medications and pain control  Monitor vitals, labs, and imaging  DISPO: pending consults and clinical improvement, awaiting full approval by patient's insurance for ECF stay at [facility]. Anticipate discharge today or tomorrow pending transport    --  Mcih Milan, DO  Observation Physician

## 2025-05-12 NOTE — PLAN OF CARE
Problem: Discharge Planning  Goal: Discharge to home or other facility with appropriate resources  Outcome: Completed     Problem: Safety - Adult  Goal: Free from fall injury  Outcome: Completed     Problem: Chronic Conditions and Co-morbidities  Goal: Patient's chronic conditions and co-morbidity symptoms are monitored and maintained or improved  Outcome: Completed     Problem: Pain  Goal: Verbalizes/displays adequate comfort level or baseline comfort level  Outcome: Completed     Problem: Neurosensory - Adult  Goal: Achieves stable or improved neurological status  Outcome: Completed  Goal: Absence of seizures  Outcome: Completed  Goal: Remains free of injury related to seizures activity  Outcome: Completed  Goal: Achieves maximal functionality and self care  Outcome: Completed     Problem: Cardiovascular - Adult  Goal: Maintains optimal cardiac output and hemodynamic stability  Outcome: Completed  Goal: Absence of cardiac dysrhythmias or at baseline  Outcome: Completed     Problem: Musculoskeletal - Adult  Goal: Return mobility to safest level of function  Outcome: Completed  Goal: Maintain proper alignment of affected body part  Outcome: Completed  Goal: Return ADL status to a safe level of function  Outcome: Completed     Problem: Infection - Adult  Goal: Absence of infection at discharge  Outcome: Completed  Goal: Absence of infection during hospitalization  Outcome: Completed  Goal: Absence of fever/infection during anticipated neutropenic period  Outcome: Completed     Problem: ABCDS Injury Assessment  Goal: Absence of physical injury  Outcome: Completed

## 2025-05-12 NOTE — DISCHARGE SUMMARY
CDU Discharge Summary        Patient:  Mina Gill  YOB: 1940    MRN: [id number]   Acct: [id number]    Primary Care Physician: Tisha Glasgow MD    Admit date:  5/2/2025  1:28 PM  Discharge date: 5/12/25     Discharge Diagnoses:     1.)  Chest pain, secondary to unknown etiology.  Symptoms improved with inpatient consult to cardiology, IV fluids, rest, and analgesics.    Follow-up:  Call today/tomorrow for a follow up appointment with Tisha Glasgow MD , or return to the Emergency Room with worsening symptoms    Stressed to patient the importance of following up with primary care doctor for further workup/management of symptoms.  Pt verbalizes understanding and agrees with plan.    Discharge Medication Changes:       Medication List        CONTINUE taking these medications      albuterol sulfate  (90 Base) MCG/ACT inhaler  Commonly known as: Proventil HFA  Inhale 2 puffs into the lungs every 6 hours as needed for Wheezing     allopurinol 100 MG tablet  Commonly known as: ZYLOPRIM  TAKE ONE (1) TABLET BY MOUTH ONCE DAILY FOR GOUT     atorvastatin 40 MG tablet  Commonly known as: LIPITOR  TAKE ONE (1) TABLET BY MOUTH ONCE DAILY     carbidopa-levodopa-entacapone -200 MG Tabs per tablet  Commonly known as: STALEVO 200  TAKE ONE (1) TABLET BY MOUTH THREE (3) TIMES DAILY     clopidogrel 75 MG tablet  Commonly known as: PLAVIX  TAKE 1 TABLET BY MOUTH ONCE DAILY -FOR ANTICOAGULANT     Compression Stockings Misc  by Does not apply route     fluticasone 50 MCG/ACT nasal spray  Commonly known as: FLONASE  2 sprays by Each Nostril route daily     furosemide 20 MG tablet  Commonly known as: LASIX  Take 1 tablet by mouth daily as needed (signs of fluid overload - weight gain, leg swelling, shortness of breath)     gabapentin 100 MG capsule  Commonly known as: NEURONTIN  TAKE ONE (1) CAPSULE BY MOUTH DAILY FOR 60 DAYS.     latanoprost 0.005 % ophthalmic solution  Commonly known as:  ms    QTc Calculation (Bazett) 508 ms    P Axis 82 degrees    R Axis -78 degrees    T Axis 94 degrees   Nuclear stress test with myocardial perfusion   Result Value Ref Range    Body Surface Area 1.96 m2    Baseline Systolic  mmHg    Baseline Diastolic BP 68 mmHg    Stress Systolic  mmHg    Stress Diastolic BP 68 mmHg    Baseline HR 69 BPM    Stress Peak HR 91 BPM    Stress Estimated Workload 1.0 METS    Stress Rate Pressure Product 11,011 BPM*mmHg    Stress Target  bpm    Stress Percent HR Achieved 67 %    Stress ST Depression 0 mm     Nuclear stress test with myocardial perfusion  Result Date: 5/3/2025    Stress Test: A pharmacological stress test was performed using regadenoson (Lexiscan). PO caffeine given as a reversal agent.   ECG: The stress ECG was negative for ischemia.   Baseline ECG: Electronic atrial ventricular pacemaker.   Stress Test: A pharmacological stress test was performed using regadenoson (Lexiscan). PO caffeine given as a reversal agent. TECHNIQUE: Rest dose: 13 mCi Tc-99m sestamibi intravenously Stress dose: 38 mCi Tc-99m sestamibi intravenously Under cardiology supervision, 0.4 mg Lexiscan was infused intravenously prior to injection of the stress dose. SPECT imaging was acquired following injection of the sestamibi. ECG gating was obtained following the stress acquisition. COMPARISON: Similar exam 03/07/2024 HISTORY: ORDERING SYSTEM PROVIDED HISTORY: Angina FINDINGS: Perfusion: Image quality is good with no significant attenuation artifact. There are no fixed or reversible perfusion defects. Summed stress score: 0 Summed rest score: 0 Summed reversibility score: 0 Scores are visually adjusted for potential artifact. TID score: 1.05 (threshold value of 1.39 is used for Lexiscan stress with Tc-99m) Function: End diastolic volume: 51 mL Left ventricular ejection fraction: 67% Wall motion abnormalities: None.      Perfusion: Normal exam Function: Normal exam Risk

## 2025-05-12 NOTE — FLOWSHEET NOTE
Dr Milan notified of pt complaints of feeling \"weak and lightheaded\", no specific complaints and then asked if he could get in shower.  Told pt that he will be washed up at some point today but not right now with his complaints.

## 2025-05-12 NOTE — CARE COORDINATION
Case Management   Daily Progress Note       Patient Name: Mina Gill                   YOB: 1940  Diagnosis: Chest pain [R07.9]                       GMLOS: 1.7 days  Length of Stay: 9  days    Anticipated Discharge Date: Ready for discharge    Readmission Risk (Low < 19, Mod (19-27), High > 27): Readmission Risk Score: 21.9        Current Transitional Plan    [] Home Independently    [] Home with HC    [x] Skilled Nursing Facility    [] Acute Rehabilitation    [] Long Term Acute Care (LTAC)    [] Other:     Plan for the Stay (Medical Management) :    Awaiting peer to peer per attending note, sent PS to confirm plan, awaiting response    1154 CDU resident states peer to peer was overturned per Dr. Kirby.  I have no documentation of this.  Left message with Kala at [facility] to inquire if they got approval    1219 spoke with Wolfgang barahona to peer line, states auth number is [id number], I asked her to fax a copy of the auth approval.  Awaiting call from Kala [facility].  Requested DC order and attending to sign rodger    1424 left another  for Kala at [facility], also attempted to call Natalia, unable to leave , mailbox full.    1459 spoke with Juany at [facility], they got approval and can accept.  Transportation arranged for 5pm pickup per Radha at [facility].  Juany updated.  Patient updated, agreeable.  MARCO ANTONIO Singletary updated and given number for report.    Workflow Continuation (Additional Notes) :        PHIL HOWARD RN  May 12, 2025

## 2025-05-12 NOTE — PROGRESS NOTES
[facility]  CDU / OBSERVATION ENCOUNTER  ATTENDING NOTE       I performed a history and physical examination of the patient and discussed management with the resident or midlevel provider. I reviewed the resident or midlevel provider's note and agree with the documented findings and plan of care. Any areas of disagreement are noted on the chart. I was personally present for the key portions of any procedures. I have documented in the chart those procedures where I was not present during the key portions. I have reviewed the nurses notes. I agree with the chief complaint, past medical history, past surgical history, allergies, medications, social and family history as documented unless otherwise noted below.    The Family history, social history, and ROS are effectively unchanged since admission unless noted elsewhere in the chart.    This patient was placed in the observation unit for reevaluation for possible admission to the hospital    Patient seen and examined by me this morning.  Patient is complaining of some increased generalized weakness.  He denies any new pain or shortness of breath.  PT/OT has been ordered for patient and appreciate their recommendations.  Patient is awaiting preapproval for placement.    Noemy Tilley MD  Attending Emergency  Physician

## 2025-05-13 ENCOUNTER — TELEPHONE (OUTPATIENT)
Age: 85
End: 2025-05-13

## 2025-05-13 NOTE — TELEPHONE ENCOUNTER
Care Transitions Initial Follow Up Call    Outreach made within 2 business days of discharge: Yes    Patient: Mina Gill Patient : 1940   MRN: [id number]  Reason for Admission: Chest Pain  Discharge Date: 25       Spoke with: Left  for patient to call the office and schedule. Patient may possibly be admitted in another facility.    Discharge department/facility: [facility]    Follow Up  Future Appointments   Date Time Provider Department Center   2025  2:00 PM Felicia Gifford MD Neuro [facility] Neurology -       Antonio Bucio MA

## 2025-05-26 ENCOUNTER — APPOINTMENT (OUTPATIENT)
Dept: GENERAL RADIOLOGY | Age: 85
End: 2025-05-26
Payer: COMMERCIAL

## 2025-05-26 ENCOUNTER — HOSPITAL ENCOUNTER (EMERGENCY)
Age: 85
Discharge: HOME | End: 2025-05-27
Attending: STUDENT IN AN ORGANIZED HEALTH CARE EDUCATION/TRAINING PROGRAM
Payer: COMMERCIAL

## 2025-05-26 ENCOUNTER — APPOINTMENT (OUTPATIENT)
Dept: CT IMAGING | Age: 85
End: 2025-05-26
Payer: COMMERCIAL

## 2025-05-26 ENCOUNTER — HOSPITAL ENCOUNTER (EMERGENCY)
Age: 85
Discharge: HOME | End: 2025-05-26
Attending: EMERGENCY MEDICINE
Payer: COMMERCIAL

## 2025-05-26 VITALS
TEMPERATURE: 97.7 F | RESPIRATION RATE: 22 BRPM | OXYGEN SATURATION: 95 % | HEART RATE: 70 BPM | DIASTOLIC BLOOD PRESSURE: 90 MMHG | SYSTOLIC BLOOD PRESSURE: 124 MMHG

## 2025-05-26 VITALS
HEART RATE: 70 BPM | OXYGEN SATURATION: 98 % | RESPIRATION RATE: 17 BRPM | TEMPERATURE: 98.5 F | SYSTOLIC BLOOD PRESSURE: 120 MMHG | DIASTOLIC BLOOD PRESSURE: 66 MMHG | WEIGHT: 180 LBS | BODY MASS INDEX: 28.19 KG/M2

## 2025-05-26 DIAGNOSIS — R06.02 SHORTNESS OF BREATH: Primary | ICD-10-CM

## 2025-05-26 DIAGNOSIS — R53.83 OTHER FATIGUE: Primary | ICD-10-CM

## 2025-05-26 LAB
ALBUMIN SERPL-MCNC: 4.2 G/DL (ref 3.5–5.2)
ALP SERPL-CCNC: 129 U/L (ref 40–129)
ALT SERPL-CCNC: 33 U/L (ref 10–50)
ANION GAP SERPL CALCULATED.3IONS-SCNC: 13 MMOL/L (ref 9–16)
ANION GAP SERPL CALCULATED.3IONS-SCNC: 14 MMOL/L (ref 9–16)
AST SERPL-CCNC: 24 U/L (ref 10–50)
BASOPHILS # BLD: 0.03 K/UL (ref 0–0.2)
BASOPHILS # BLD: 0.03 K/UL (ref 0–0.2)
BASOPHILS NFR BLD: 0 % (ref 0–2)
BASOPHILS NFR BLD: 1 % (ref 0–2)
BILIRUB SERPL-MCNC: 0.4 MG/DL (ref 0–1.2)
BILIRUB UR QL STRIP: NEGATIVE
BNP SERPL-MCNC: 262 PG/ML (ref 0–450)
BUN SERPL-MCNC: 16 MG/DL (ref 8–23)
BUN SERPL-MCNC: 19 MG/DL (ref 8–23)
CALCIUM SERPL-MCNC: 9.4 MG/DL (ref 8.6–10.4)
CALCIUM SERPL-MCNC: 9.5 MG/DL (ref 8.6–10.4)
CHLORIDE SERPL-SCNC: 94 MMOL/L (ref 98–107)
CHLORIDE SERPL-SCNC: 97 MMOL/L (ref 98–107)
CLARITY UR: CLEAR
CO2 SERPL-SCNC: 25 MMOL/L (ref 20–31)
CO2 SERPL-SCNC: 25 MMOL/L (ref 20–31)
COLOR UR: YELLOW
COMMENT: ABNORMAL
CREAT SERPL-MCNC: 1.2 MG/DL (ref 0.7–1.2)
CREAT SERPL-MCNC: 1.4 MG/DL (ref 0.7–1.2)
EOSINOPHIL # BLD: 0.13 K/UL (ref 0–0.44)
EOSINOPHIL # BLD: 0.27 K/UL (ref 0–0.44)
EOSINOPHILS RELATIVE PERCENT: 2 % (ref 0–4)
EOSINOPHILS RELATIVE PERCENT: 5 % (ref 1–4)
ERYTHROCYTE [DISTWIDTH] IN BLOOD BY AUTOMATED COUNT: 13.2 % (ref 11.8–14.4)
ERYTHROCYTE [DISTWIDTH] IN BLOOD BY AUTOMATED COUNT: 13.3 % (ref 11.5–14.9)
FLUAV RNA RESP QL NAA+PROBE: NOT DETECTED
FLUBV RNA RESP QL NAA+PROBE: NOT DETECTED
GFR, ESTIMATED: 49 ML/MIN/1.73M2
GFR, ESTIMATED: 59 ML/MIN/1.73M2
GLUCOSE SERPL-MCNC: 226 MG/DL (ref 74–99)
GLUCOSE SERPL-MCNC: 252 MG/DL (ref 74–99)
GLUCOSE UR STRIP-MCNC: ABNORMAL MG/DL
HCT VFR BLD AUTO: 39.8 % (ref 41–53)
HCT VFR BLD AUTO: 42.3 % (ref 40.7–50.3)
HGB BLD-MCNC: 14 G/DL (ref 13–17)
HGB BLD-MCNC: 14.2 G/DL (ref 13.5–17.5)
HGB UR QL STRIP.AUTO: NEGATIVE
IMM GRANULOCYTES # BLD AUTO: 0.03 K/UL (ref 0–0.3)
IMM GRANULOCYTES # BLD AUTO: <0.03 K/UL (ref 0–0.3)
IMM GRANULOCYTES NFR BLD: 0 %
IMM GRANULOCYTES NFR BLD: 0 %
INR PPP: 0.9
KETONES UR STRIP-MCNC: NEGATIVE MG/DL
LEUKOCYTE ESTERASE UR QL STRIP: NEGATIVE
LYMPHOCYTES NFR BLD: 0.97 K/UL (ref 1.1–3.7)
LYMPHOCYTES NFR BLD: 1.7 K/UL (ref 1.1–3.7)
LYMPHOCYTES RELATIVE PERCENT: 14 % (ref 24–44)
LYMPHOCYTES RELATIVE PERCENT: 30 % (ref 24–43)
MAGNESIUM SERPL-MCNC: 2.1 MG/DL (ref 1.6–2.4)
MCH RBC QN AUTO: 31.6 PG (ref 25.2–33.5)
MCH RBC QN AUTO: 33.2 PG (ref 26–34)
MCHC RBC AUTO-ENTMCNC: 33.1 G/DL (ref 28.4–34.8)
MCHC RBC AUTO-ENTMCNC: 35.7 G/DL (ref 31–37)
MCV RBC AUTO: 93 FL (ref 80–100)
MCV RBC AUTO: 95.5 FL (ref 82.6–102.9)
MONOCYTES NFR BLD: 0.48 K/UL (ref 0.1–1.2)
MONOCYTES NFR BLD: 0.5 K/UL (ref 0.1–1.2)
MONOCYTES NFR BLD: 7 % (ref 3–12)
MONOCYTES NFR BLD: 9 % (ref 3–12)
NEUTROPHILS NFR BLD: 55 % (ref 36–65)
NEUTROPHILS NFR BLD: 77 % (ref 36–66)
NEUTS SEG NFR BLD: 3.2 K/UL (ref 1.5–8.1)
NEUTS SEG NFR BLD: 5.19 K/UL (ref 1.5–8.1)
NITRITE UR QL STRIP: NEGATIVE
NRBC BLD-RTO: 0 PER 100 WBC
NRBC BLD-RTO: 0 PER 100 WBC
PH UR STRIP: 7.5 [PH] (ref 5–8)
PLATELET # BLD AUTO: 207 K/UL (ref 138–453)
PLATELET # BLD AUTO: 208 K/UL (ref 150–450)
PMV BLD AUTO: 9 FL (ref 8.1–13.5)
PMV BLD AUTO: 9 FL (ref 8–13.5)
POTASSIUM SERPL-SCNC: 3.8 MMOL/L (ref 3.7–5.3)
POTASSIUM SERPL-SCNC: 3.9 MMOL/L (ref 3.7–5.3)
PROT SERPL-MCNC: 7.1 G/DL (ref 6.6–8.7)
PROT UR STRIP-MCNC: NEGATIVE MG/DL
PROTHROMBIN TIME: 12.6 SEC (ref 11.8–14.6)
RBC # BLD AUTO: 4.28 M/UL (ref 4.21–5.77)
RBC # BLD AUTO: 4.43 M/UL (ref 4.21–5.77)
SARS-COV-2 RNA RESP QL NAA+PROBE: NOT DETECTED
SODIUM SERPL-SCNC: 133 MMOL/L (ref 136–145)
SODIUM SERPL-SCNC: 135 MMOL/L (ref 136–145)
SOURCE: NORMAL
SP GR UR STRIP: 1.01 (ref 1–1.03)
SPECIMEN DESCRIPTION: NORMAL
T4 FREE SERPL-MCNC: 1.2 NG/DL (ref 0.9–1.7)
TROPONIN I SERPL HS-MCNC: 25 NG/L (ref 0–22)
TROPONIN I SERPL HS-MCNC: 27 NG/L (ref 0–22)
TROPONIN I SERPL HS-MCNC: 28 NG/L (ref 0–22)
TSH SERPL DL<=0.05 MIU/L-ACNC: 4.86 UIU/ML (ref 0.27–4.2)
UROBILINOGEN UR STRIP-ACNC: NORMAL EU/DL (ref 0–1)
WBC OTHER # BLD: 5.7 K/UL (ref 3.5–11.3)
WBC OTHER # BLD: 6.8 K/UL (ref 3.5–11)

## 2025-05-26 PROCEDURE — 84484 ASSAY OF TROPONIN QUANT: CPT

## 2025-05-26 PROCEDURE — 84443 ASSAY THYROID STIM HORMONE: CPT

## 2025-05-26 PROCEDURE — 99285 EMERGENCY DEPT VISIT HI MDM: CPT

## 2025-05-26 PROCEDURE — 2500000003 HC RX 250 WO HCPCS: Performed by: STUDENT IN AN ORGANIZED HEALTH CARE EDUCATION/TRAINING PROGRAM

## 2025-05-26 PROCEDURE — 71045 X-RAY EXAM CHEST 1 VIEW: CPT

## 2025-05-26 PROCEDURE — 83880 ASSAY OF NATRIURETIC PEPTIDE: CPT

## 2025-05-26 PROCEDURE — 85025 COMPLETE CBC W/AUTO DIFF WBC: CPT

## 2025-05-26 PROCEDURE — 36415 COLL VENOUS BLD VENIPUNCTURE: CPT

## 2025-05-26 PROCEDURE — 81003 URINALYSIS AUTO W/O SCOPE: CPT

## 2025-05-26 PROCEDURE — 83735 ASSAY OF MAGNESIUM: CPT

## 2025-05-26 PROCEDURE — 6370000000 HC RX 637 (ALT 250 FOR IP)

## 2025-05-26 PROCEDURE — 85610 PROTHROMBIN TIME: CPT

## 2025-05-26 PROCEDURE — 93005 ELECTROCARDIOGRAM TRACING: CPT

## 2025-05-26 PROCEDURE — 96361 HYDRATE IV INFUSION ADD-ON: CPT

## 2025-05-26 PROCEDURE — 87636 SARSCOV2 & INF A&B AMP PRB: CPT

## 2025-05-26 PROCEDURE — 80048 BASIC METABOLIC PNL TOTAL CA: CPT

## 2025-05-26 PROCEDURE — 2580000003 HC RX 258: Performed by: STUDENT IN AN ORGANIZED HEALTH CARE EDUCATION/TRAINING PROGRAM

## 2025-05-26 PROCEDURE — 71260 CT THORAX DX C+: CPT

## 2025-05-26 PROCEDURE — 96360 HYDRATION IV INFUSION INIT: CPT

## 2025-05-26 PROCEDURE — 6360000004 HC RX CONTRAST MEDICATION: Performed by: STUDENT IN AN ORGANIZED HEALTH CARE EDUCATION/TRAINING PROGRAM

## 2025-05-26 PROCEDURE — 84439 ASSAY OF FREE THYROXINE: CPT

## 2025-05-26 PROCEDURE — 93005 ELECTROCARDIOGRAM TRACING: CPT | Performed by: STUDENT IN AN ORGANIZED HEALTH CARE EDUCATION/TRAINING PROGRAM

## 2025-05-26 PROCEDURE — 80053 COMPREHEN METABOLIC PANEL: CPT

## 2025-05-26 RX ORDER — ACETAMINOPHEN 325 MG/1
650 TABLET ORAL ONCE
Status: COMPLETED | OUTPATIENT
Start: 2025-05-26 | End: 2025-05-26

## 2025-05-26 RX ORDER — SODIUM CHLORIDE 0.9 % (FLUSH) 0.9 %
10 SYRINGE (ML) INJECTION PRN
Status: DISCONTINUED | OUTPATIENT
Start: 2025-05-26 | End: 2025-05-27 | Stop reason: HOSPADM

## 2025-05-26 RX ORDER — 0.9 % SODIUM CHLORIDE 0.9 %
100 INTRAVENOUS SOLUTION INTRAVENOUS ONCE
Status: COMPLETED | OUTPATIENT
Start: 2025-05-26 | End: 2025-05-26

## 2025-05-26 RX ORDER — 0.9 % SODIUM CHLORIDE 0.9 %
1000 INTRAVENOUS SOLUTION INTRAVENOUS ONCE
Status: COMPLETED | OUTPATIENT
Start: 2025-05-26 | End: 2025-05-26

## 2025-05-26 RX ORDER — IOPAMIDOL 755 MG/ML
75 INJECTION, SOLUTION INTRAVASCULAR
Status: COMPLETED | OUTPATIENT
Start: 2025-05-26 | End: 2025-05-26

## 2025-05-26 RX ADMIN — ACETAMINOPHEN 650 MG: 325 TABLET ORAL at 05:52

## 2025-05-26 RX ADMIN — IOPAMIDOL 75 ML: 755 INJECTION, SOLUTION INTRAVENOUS at 22:06

## 2025-05-26 RX ADMIN — SODIUM CHLORIDE 100 ML: 9 INJECTION, SOLUTION INTRAVENOUS at 22:06

## 2025-05-26 RX ADMIN — SODIUM CHLORIDE 1000 ML: 0.9 INJECTION, SOLUTION INTRAVENOUS at 20:59

## 2025-05-26 RX ADMIN — SODIUM CHLORIDE, PRESERVATIVE FREE 10 ML: 5 INJECTION INTRAVENOUS at 22:06

## 2025-05-26 ASSESSMENT — ENCOUNTER SYMPTOMS
DIARRHEA: 0
NAUSEA: 0
CHEST TIGHTNESS: 0
RHINORRHEA: 0
VOMITING: 0
ABDOMINAL PAIN: 0
SORE THROAT: 0
EYE REDNESS: 0
SHORTNESS OF BREATH: 0
EYE DISCHARGE: 0

## 2025-05-26 ASSESSMENT — PAIN SCALES - GENERAL: PAINLEVEL_OUTOF10: 6

## 2025-05-26 NOTE — ED NOTES
Writer made contact with patient's partner, Dana, who confirmed she'll be home upon patient's arrival.

## 2025-05-26 NOTE — ED NOTES
The following labs were labeled with appropriate pt sticker and tubed to lab:     [x] Blue     [x] Lavender   [] on ice  [x] Green/yellow  [x] Green/black [] on ice  [] Guzman  [] on ice  [x] Yellow  [] Red  [] Pink  [] Type/ Screen  [] ABG  [] VBG    [] COVID-19 swab    [] Rapid  [] PCR  [] Flu swab  [] Peds Viral Panel     [] Urine Sample  [] Fecal Sample  [] Pelvic Cultures  [] Blood Cultures  [] X 2  [] STREP Cultures  [] Wound Cultures

## 2025-05-26 NOTE — ED NOTES
LUKAS informed by MARCO ANTONIO Enciso that pt's significant other could not be reached after calling three times but pt insist she will be there to let him in. SW informed day shift SW Sagrario of situation. Handing case over to day shift LUKAS.

## 2025-05-26 NOTE — ED NOTES
Writer called Dana's number on file x3, no answer. Per chart and pt it states this person lives at the same address. Pt states Dana should be home to let him inside. LUKAS [id number].

## 2025-05-26 NOTE — ED PROVIDER NOTES
[facility] EMERGENCY DEPARTMENT  Emergency Department Encounter  Emergency Medicine Resident     Pt Name:Mina Gill  MRN: [id number]  Birthdate 1940  Date of evaluation: 5/26/25  PCP:  Tisha Glasgow MD  Note Started: 4:59 AM EDT      CHIEF COMPLAINT       Chief Complaint   Patient presents with    Shortness of Breath    Chest Pain       HISTORY OF PRESENT ILLNESS  (Location/Symptom, Timing/Onset, Context/Setting, Quality, Duration, Modifying Factors, Severity.)      Mina Gill is a 84 y.o. male who presents with complaints of shortness of breath.  Patient states he had shortness of breath starting this evening.  Patient has a history of CHF, Parkinson's disease, frequent admissions.  He denies any history of DVT or PE, not on anticoagulation.  Per EMS patient was satting 98% on room air.  Vital signs are stable for them.  Patient denies any chest pain, abdominal pain, nausea, vomiting, diarrhea, fever, chills, cough or any other complaints.    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       has a past surgical history that includes Colonoscopy (11/02/2012); shoulder surgery (Right); pr colsc flx w/rmvl of tumor polyp lesion snare tq (N/A, 09/19/2017); Colonoscopy (09/19/2017); Cholecystectomy, laparoscopic (N/A, 10/29/2022); Esophagogastroduodenoscopy (01/09/2023); Upper gastrointestinal endoscopy (N/A, 01/09/2023); Upper 
     FACULTY SIGN-OUT  ADDENDUM       Patient: Mina Gill   MRN: [id number]  PCP:  Tisha Glasgow MD  Note Started: 5/26/25, 7:30 AM EDT  Attestation  I was available and discussed any additional care issues that arose and coordinated the management plans with the resident(s) caring for the patient during my duty period. Any areas of disagreement with resident's documentation of care or procedures are noted on the chart. I was personally present for the key portions of any/all procedures during my duty period. I have documented in the chart those procedures where I was not present during the key portions.   The patient's initial evaluation and plan have been discussed with the prior provider who initially evaluated the patient.      Pertinent Comments:  The patient is a 84 y.o. male taken in signout with chest pain and shortness of breath with recent cardiac workup and stress test negative feeling better now with negative workup  We are awaiting transfer home    ED COURSE      The patient was given the following medications:  Orders Placed This Encounter   Medications    acetaminophen (TYLENOL) tablet 650 mg       RECENT VITALS:   BP: 120/66  Pulse: 70  Respirations: 17  Temp: 98.5 °F (36.9 °C) SpO2: 98 %    (Please note that portions of this note were completed with a voice recognition program.  Efforts were made to edit the dictations but occasionally words are mis-transcribed.)    Yandel Walker MD [facility]  Attending Emergency Medicine Physician       Yandel Walker MD  05/26/25 0739    
   [facility] EMERGENCY DEPARTMENT     Emergency Department     Faculty Attestation    I performed a history and physical examination of the patient and discussed management with the resident. I reviewed the resident’s note and agree with the documented findings and plan of care. Any areas of disagreement are noted on the chart. I was personally present for the key portions of any procedures. I have documented in the chart those procedures where I was not present during the key portions. I have reviewed the emergency nurses triage note. I agree with the chief complaint, past medical history, past surgical history, allergies, medications, social and family history as documented unless otherwise noted below. For Physician Assistant/ Nurse Practitioner cases/documentation I have personally evaluated this patient and have completed at least one if not all key elements of the E/M (history, physical exam, and MDM). Additional findings are as noted.    5:04 AM EDT    Patient presents with shortness of breath and chest pain that he says started this morning.  He says he was not doing anything in particular when it started.  He denies any fever, cough, congestion.  He denies any abdominal pain, nausea or vomiting.  Patient did have recent admission at the beginning of this month and had a cardiac stress test which was low risk at that time.  Patient also had an unremarkable echo no in March of this year.  On my exam, patient is resting comfortably in the bed.  He is not respiratory distress.  Lungs clear to auscultation bilaterally.  The bilateral calves are nontender nonswollen.  Will get EKG, chest x-ray, labs and reassess.    EKG Interpretation    Interpreted by emergency department physician    Rhythm: paced  Rate: normal  Axis: normal  Ectopy: none  Conduction: normal  ST Segments: nonspecific changes  T Waves: non specific changes  Q Waves: nonspecific    Clinical Impression: paced rhythm    Noemy Tilley, 
Stress Test: A pharmacological stress test was performed using regadenoson (Lexiscan). PO caffeine given as a reversal agent. TECHNIQUE: Rest dose: 13 mCi Tc-99m sestamibi intravenously Stress dose: 38 mCi Tc-99m sestamibi intravenously Under cardiology supervision, 0.4 mg Lexiscan was infused intravenously prior to injection of the stress dose. SPECT imaging was acquired following injection of the sestamibi. ECG gating was obtained following the stress acquisition. COMPARISON: Similar exam 03/07/2024 HISTORY: ORDERING SYSTEM PROVIDED HISTORY: Angina FINDINGS: Perfusion: Image quality is good with no significant attenuation artifact. There are no fixed or reversible perfusion defects. Summed stress score: 0 Summed rest score: 0 Summed reversibility score: 0 Scores are visually adjusted for potential artifact. TID score: 1.05 (threshold value of 1.39 is used for Lexiscan stress with Tc-99m) Function: End diastolic volume: 51 mL Left ventricular ejection fraction: 67% Wall motion abnormalities: None.      Perfusion: Normal exam Function: Normal exam Risk stratification: Low     XR CHEST PORTABLE  Result Date: 5/2/2025  EXAMINATION: ONE XRAY VIEW OF THE CHEST 5/2/2025 2:13 pm COMPARISON: 04/26/2025 HISTORY: ORDERING SYSTEM PROVIDED HISTORY: chest pain TECHNOLOGIST PROVIDED HISTORY: chest pain FINDINGS: Transvenous pacer in place. The lungs are without acute focal process.  There is no effusion or pneumothorax. The cardiomediastinal silhouette is stable. The osseous structures are stable.     No acute process.     CT CHEST PULMONARY EMBOLISM W CONTRAST  Result Date: 4/26/2025  EXAMINATION: CTA OF THE CHEST 4/26/2025 6:25 am TECHNIQUE: CTA of the chest was performed after the administration of intravenous contrast.  Multiplanar reformatted images are provided for review.  MIP images are provided for review. Automated exposure control, iterative reconstruction, and/or weight based adjustment of the mA/kV was utilized to

## 2025-05-26 NOTE — ED NOTES
LUKAS met with pt in regards to transportation. Pt reports he has someone who stays with him and helps take care of him. Pt states they will be there to let him in but does not have the phone number for SW to call them and confirm. MARCO ANTONIO Enciso found phone number and will call to make sure someone is home.     Pt reports his stomach and head are still hurting him.  Dr. Miles informed.       Transportation arranged with [facility] for 0800.

## 2025-05-26 NOTE — ED TRIAGE NOTES
Pt presents to the ED by EMS with c/o SOB and chest pain since around 0100 this morning. EMS administered 324 of ASA.  Pt denies ABD pain, emesis, nausea, diarrhea, fever, or chills. PT denies injury. Pt A&O4, RR even and unlabored on room air. PT speaking in complete sentences. PT placed on full cardiac monitor. EKG obtained. PIV in place. Call light within reach.

## 2025-05-27 LAB
EKG ATRIAL RATE: 70 BPM
EKG P AXIS: -29 DEGREES
EKG P-R INTERVAL: 198 MS
EKG Q-T INTERVAL: 448 MS
EKG QRS DURATION: 160 MS
EKG QTC CALCULATION (BAZETT): 483 MS
EKG R AXIS: -79 DEGREES
EKG T AXIS: 95 DEGREES
EKG VENTRICULAR RATE: 70 BPM

## 2025-05-27 PROCEDURE — 93010 ELECTROCARDIOGRAM REPORT: CPT | Performed by: INTERNAL MEDICINE

## 2025-05-27 NOTE — ED PROVIDER NOTES
EMERGENCY DEPARTMENT ENCOUNTER    Pt Name: Mina Gill  MRN: [id number]  Birthdate 1940  Date of evaluation: 5/26/25  CHIEF COMPLAINT       Chief Complaint   Patient presents with    Cough    Fatigue     HISTORY OF PRESENT ILLNESS   This is a 84-year-old male he is got a history of Parkinson's hypertension, hyperlipidemia, diabetes    Patient presenting with generalized weakness    Feels weak for the last few days    Mild cough    No other localizing symptoms denies abdominal pain vomiting diarrhea dysuria hematuria frequency numbness tingling weakness              REVIEW OF SYSTEMS     Review of Systems   Constitutional:  Positive for fatigue. Negative for chills and fever.   HENT:  Negative for rhinorrhea and sore throat.    Eyes:  Negative for discharge and redness.   Respiratory:  Negative for chest tightness and shortness of breath.    Cardiovascular:  Negative for chest pain.   Gastrointestinal:  Negative for abdominal pain, diarrhea, nausea and vomiting.   Genitourinary:  Negative for dysuria and frequency.   Musculoskeletal:  Negative for arthralgias and myalgias.   Skin:  Negative for rash.   Neurological:  Positive for weakness. Negative for numbness.   Psychiatric/Behavioral:  Negative for suicidal ideas.    All other systems reviewed and are negative.    PASTMEDICAL HISTORY     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 ([facility]) 3/18/2011    d/t being hurt on the job    CKD (chronic kidney disease) stage 2, GFR 60-89 ml/min     CKD (chronic kidney disease) stage 3, GFR 30-59 ml/min ([facility])     Diverticulosis of colon     GERD (gastroesophageal reflux disease)     History of non-ST elevation myocardial infarction (NSTEMI) 6/2022 10/27/2022    Impaired renal function     MRSA (methicillin resistant staph aureus) culture positive 9/23/2015    leg    Osteoarthritis of knee     Left    Parkinson disease ([facility])     Proteinuria     Skull fracture ([facility]) 3/18/2011

## 2025-05-28 LAB
EKG ATRIAL RATE: 39 BPM
EKG P-R INTERVAL: 192 MS
EKG Q-T INTERVAL: 446 MS
EKG QRS DURATION: 160 MS
EKG QTC CALCULATION (BAZETT): 504 MS
EKG R AXIS: -83 DEGREES
EKG T AXIS: 95 DEGREES
EKG VENTRICULAR RATE: 77 BPM

## 2025-05-29 ENCOUNTER — APPOINTMENT (OUTPATIENT)
Dept: GENERAL RADIOLOGY | Age: 85
DRG: 057 | End: 2025-05-29
Payer: COMMERCIAL

## 2025-05-29 ENCOUNTER — HOSPITAL ENCOUNTER (INPATIENT)
Age: 85
LOS: 10 days | Discharge: HOME | DRG: 057 | End: 2025-06-12
Attending: EMERGENCY MEDICINE | Admitting: EMERGENCY MEDICINE
Payer: COMMERCIAL

## 2025-05-29 DIAGNOSIS — K20.90 ESOPHAGITIS: ICD-10-CM

## 2025-05-29 DIAGNOSIS — G44.329 CHRONIC POST-TRAUMATIC HEADACHE, NOT INTRACTABLE: ICD-10-CM

## 2025-05-29 DIAGNOSIS — G20.A1 PARKINSON'S DISEASE, UNSPECIFIED WHETHER DYSKINESIA PRESENT, UNSPECIFIED WHETHER MANIFESTATIONS FLUCTUATE (HCC): ICD-10-CM

## 2025-05-29 DIAGNOSIS — G20.A1 PARKINSON'S DISEASE WITHOUT FLUCTUATING MANIFESTATIONS, UNSPECIFIED WHETHER DYSKINESIA PRESENT (HCC): ICD-10-CM

## 2025-05-29 DIAGNOSIS — G43.009 MIGRAINE WITHOUT AURA AND WITHOUT STATUS MIGRAINOSUS, NOT INTRACTABLE: ICD-10-CM

## 2025-05-29 DIAGNOSIS — R53.1 GENERALIZED WEAKNESS: Primary | ICD-10-CM

## 2025-05-29 DIAGNOSIS — K11.7 DROOLING: ICD-10-CM

## 2025-05-29 DIAGNOSIS — G25.2 RESTING TREMOR: ICD-10-CM

## 2025-05-29 PROBLEM — G20.B1: Status: ACTIVE | Noted: 2025-05-29

## 2025-05-29 LAB
ALBUMIN SERPL-MCNC: 4.7 G/DL (ref 3.5–5.2)
ALBUMIN/GLOB SERPL: 1.3 {RATIO} (ref 1–2.5)
ALP SERPL-CCNC: 160 U/L (ref 40–129)
ALT SERPL-CCNC: 26 U/L (ref 10–50)
ANION GAP SERPL CALCULATED.3IONS-SCNC: 15 MMOL/L (ref 9–16)
AST SERPL-CCNC: 27 U/L (ref 10–50)
BASOPHILS # BLD: <0.03 K/UL (ref 0–0.2)
BASOPHILS NFR BLD: 0 % (ref 0–2)
BILIRUB SERPL-MCNC: 0.6 MG/DL (ref 0–1.2)
BUN SERPL-MCNC: 12 MG/DL (ref 8–23)
CALCIUM SERPL-MCNC: 9.8 MG/DL (ref 8.6–10.4)
CHLORIDE SERPL-SCNC: 97 MMOL/L (ref 98–107)
CO2 SERPL-SCNC: 23 MMOL/L (ref 20–31)
CREAT SERPL-MCNC: 1.2 MG/DL (ref 0.7–1.2)
EOSINOPHIL # BLD: 0.13 K/UL (ref 0–0.44)
EOSINOPHILS RELATIVE PERCENT: 3 % (ref 1–4)
ERYTHROCYTE [DISTWIDTH] IN BLOOD BY AUTOMATED COUNT: 13.3 % (ref 11.8–14.4)
GFR, ESTIMATED: 59 ML/MIN/1.73M2
GLUCOSE BLD-MCNC: 177 MG/DL (ref 75–110)
GLUCOSE SERPL-MCNC: 172 MG/DL (ref 74–99)
HCT VFR BLD AUTO: 43.7 % (ref 40.7–50.3)
HGB BLD-MCNC: 15.2 G/DL (ref 13–17)
IMM GRANULOCYTES # BLD AUTO: <0.03 K/UL (ref 0–0.3)
IMM GRANULOCYTES NFR BLD: 0 %
LIPASE SERPL-CCNC: 30 U/L (ref 13–60)
LYMPHOCYTES NFR BLD: 1.26 K/UL (ref 1.1–3.7)
LYMPHOCYTES RELATIVE PERCENT: 25 % (ref 24–43)
MCH RBC QN AUTO: 32.3 PG (ref 25.2–33.5)
MCHC RBC AUTO-ENTMCNC: 34.8 G/DL (ref 28.4–34.8)
MCV RBC AUTO: 92.8 FL (ref 82.6–102.9)
MONOCYTES NFR BLD: 0.32 K/UL (ref 0.1–1.2)
MONOCYTES NFR BLD: 6 % (ref 3–12)
NEUTROPHILS NFR BLD: 66 % (ref 36–65)
NEUTS SEG NFR BLD: 3.25 K/UL (ref 1.5–8.1)
NRBC BLD-RTO: 0 PER 100 WBC
PLATELET # BLD AUTO: 205 K/UL (ref 138–453)
PMV BLD AUTO: 9 FL (ref 8.1–13.5)
POTASSIUM SERPL-SCNC: 3.6 MMOL/L (ref 3.7–5.3)
PROT SERPL-MCNC: 8.2 G/DL (ref 6.6–8.7)
RBC # BLD AUTO: 4.71 M/UL (ref 4.21–5.77)
SODIUM SERPL-SCNC: 135 MMOL/L (ref 136–145)
TROPONIN I SERPL HS-MCNC: 22 NG/L (ref 0–22)
TROPONIN I SERPL HS-MCNC: 25 NG/L (ref 0–22)
WBC OTHER # BLD: 5 K/UL (ref 3.5–11.3)

## 2025-05-29 PROCEDURE — 6360000002 HC RX W HCPCS: Performed by: STUDENT IN AN ORGANIZED HEALTH CARE EDUCATION/TRAINING PROGRAM

## 2025-05-29 PROCEDURE — 2580000003 HC RX 258: Performed by: STUDENT IN AN ORGANIZED HEALTH CARE EDUCATION/TRAINING PROGRAM

## 2025-05-29 PROCEDURE — G0378 HOSPITAL OBSERVATION PER HR: HCPCS

## 2025-05-29 PROCEDURE — 6370000000 HC RX 637 (ALT 250 FOR IP): Performed by: EMERGENCY MEDICINE

## 2025-05-29 PROCEDURE — 80053 COMPREHEN METABOLIC PANEL: CPT

## 2025-05-29 PROCEDURE — 99285 EMERGENCY DEPT VISIT HI MDM: CPT

## 2025-05-29 PROCEDURE — 96374 THER/PROPH/DIAG INJ IV PUSH: CPT

## 2025-05-29 PROCEDURE — 96361 HYDRATE IV INFUSION ADD-ON: CPT

## 2025-05-29 PROCEDURE — 84484 ASSAY OF TROPONIN QUANT: CPT

## 2025-05-29 PROCEDURE — 85025 COMPLETE CBC W/AUTO DIFF WBC: CPT

## 2025-05-29 PROCEDURE — 93005 ELECTROCARDIOGRAM TRACING: CPT | Performed by: EMERGENCY MEDICINE

## 2025-05-29 PROCEDURE — 2500000003 HC RX 250 WO HCPCS: Performed by: EMERGENCY MEDICINE

## 2025-05-29 PROCEDURE — 71045 X-RAY EXAM CHEST 1 VIEW: CPT

## 2025-05-29 PROCEDURE — 83690 ASSAY OF LIPASE: CPT

## 2025-05-29 PROCEDURE — 82947 ASSAY GLUCOSE BLOOD QUANT: CPT

## 2025-05-29 RX ORDER — ENOXAPARIN SODIUM 100 MG/ML
40 INJECTION SUBCUTANEOUS DAILY
Status: DISCONTINUED | OUTPATIENT
Start: 2025-05-29 | End: 2025-06-12 | Stop reason: HOSPADM

## 2025-05-29 RX ORDER — ALBUTEROL SULFATE 90 UG/1
2 INHALANT RESPIRATORY (INHALATION) EVERY 6 HOURS PRN
Status: DISCONTINUED | OUTPATIENT
Start: 2025-05-29 | End: 2025-06-12 | Stop reason: HOSPADM

## 2025-05-29 RX ORDER — SODIUM CHLORIDE 9 MG/ML
INJECTION, SOLUTION INTRAVENOUS PRN
Status: DISCONTINUED | OUTPATIENT
Start: 2025-05-29 | End: 2025-06-12 | Stop reason: HOSPADM

## 2025-05-29 RX ORDER — ONDANSETRON 2 MG/ML
4 INJECTION INTRAMUSCULAR; INTRAVENOUS EVERY 4 HOURS PRN
Status: DISCONTINUED | OUTPATIENT
Start: 2025-05-29 | End: 2025-06-12 | Stop reason: HOSPADM

## 2025-05-29 RX ORDER — SODIUM CHLORIDE 0.9 % (FLUSH) 0.9 %
5-40 SYRINGE (ML) INJECTION EVERY 12 HOURS SCHEDULED
Status: DISCONTINUED | OUTPATIENT
Start: 2025-05-29 | End: 2025-06-12 | Stop reason: HOSPADM

## 2025-05-29 RX ORDER — NITROGLYCERIN 0.4 MG/1
0.4 TABLET SUBLINGUAL EVERY 5 MIN PRN
Status: DISCONTINUED | OUTPATIENT
Start: 2025-05-29 | End: 2025-06-12 | Stop reason: HOSPADM

## 2025-05-29 RX ORDER — TAMSULOSIN HYDROCHLORIDE 0.4 MG/1
0.4 CAPSULE ORAL DAILY
Status: DISCONTINUED | OUTPATIENT
Start: 2025-05-29 | End: 2025-06-12 | Stop reason: HOSPADM

## 2025-05-29 RX ORDER — ENTACAPONE 200 MG/1
200 TABLET ORAL 3 TIMES DAILY
Status: DISCONTINUED | OUTPATIENT
Start: 2025-05-29 | End: 2025-06-12 | Stop reason: HOSPADM

## 2025-05-29 RX ORDER — ACETAMINOPHEN 325 MG/1
650 TABLET ORAL EVERY 6 HOURS PRN
Status: DISCONTINUED | OUTPATIENT
Start: 2025-05-29 | End: 2025-06-12 | Stop reason: HOSPADM

## 2025-05-29 RX ORDER — FUROSEMIDE 20 MG/1
20 TABLET ORAL DAILY PRN
Status: DISCONTINUED | OUTPATIENT
Start: 2025-05-29 | End: 2025-06-12 | Stop reason: HOSPADM

## 2025-05-29 RX ORDER — ALLOPURINOL 100 MG/1
100 TABLET ORAL DAILY
Status: DISCONTINUED | OUTPATIENT
Start: 2025-05-29 | End: 2025-06-12 | Stop reason: HOSPADM

## 2025-05-29 RX ORDER — CLOPIDOGREL BISULFATE 75 MG/1
75 TABLET ORAL DAILY
Status: DISCONTINUED | OUTPATIENT
Start: 2025-05-29 | End: 2025-06-12 | Stop reason: HOSPADM

## 2025-05-29 RX ORDER — ACETAMINOPHEN 650 MG/1
650 SUPPOSITORY RECTAL EVERY 6 HOURS PRN
Status: DISCONTINUED | OUTPATIENT
Start: 2025-05-29 | End: 2025-06-12 | Stop reason: HOSPADM

## 2025-05-29 RX ORDER — CARBIDOPA AND LEVODOPA 25; 100 MG/1; MG/1
2 TABLET ORAL 3 TIMES DAILY
Status: DISCONTINUED | OUTPATIENT
Start: 2025-05-29 | End: 2025-06-12 | Stop reason: HOSPADM

## 2025-05-29 RX ORDER — FLUTICASONE PROPIONATE 50 MCG
2 SPRAY, SUSPENSION (ML) NASAL DAILY PRN
Status: DISCONTINUED | OUTPATIENT
Start: 2025-05-29 | End: 2025-06-12 | Stop reason: HOSPADM

## 2025-05-29 RX ORDER — ROPINIROLE 0.25 MG/1
0.5 TABLET, FILM COATED ORAL 3 TIMES DAILY
Status: DISCONTINUED | OUTPATIENT
Start: 2025-05-29 | End: 2025-06-12 | Stop reason: HOSPADM

## 2025-05-29 RX ORDER — GABAPENTIN 100 MG/1
100 CAPSULE ORAL DAILY
Status: DISCONTINUED | OUTPATIENT
Start: 2025-05-29 | End: 2025-06-12 | Stop reason: HOSPADM

## 2025-05-29 RX ORDER — 0.9 % SODIUM CHLORIDE 0.9 %
500 INTRAVENOUS SOLUTION INTRAVENOUS ONCE
Status: COMPLETED | OUTPATIENT
Start: 2025-05-29 | End: 2025-05-29

## 2025-05-29 RX ORDER — SUCRALFATE 1 G/1
1 TABLET ORAL EVERY 6 HOURS SCHEDULED
Status: DISCONTINUED | OUTPATIENT
Start: 2025-05-29 | End: 2025-06-12 | Stop reason: HOSPADM

## 2025-05-29 RX ORDER — POTASSIUM CHLORIDE 7.45 MG/ML
10 INJECTION INTRAVENOUS PRN
Status: DISCONTINUED | OUTPATIENT
Start: 2025-05-29 | End: 2025-06-12 | Stop reason: HOSPADM

## 2025-05-29 RX ORDER — LANOLIN ALCOHOL/MO/W.PET/CERES
400 CREAM (GRAM) TOPICAL DAILY
Status: DISCONTINUED | OUTPATIENT
Start: 2025-05-29 | End: 2025-06-12 | Stop reason: HOSPADM

## 2025-05-29 RX ORDER — LIDOCAINE 4 G/G
1 PATCH TOPICAL DAILY
Status: DISCONTINUED | OUTPATIENT
Start: 2025-05-29 | End: 2025-06-12 | Stop reason: HOSPADM

## 2025-05-29 RX ORDER — POTASSIUM CHLORIDE 1500 MG/1
40 TABLET, EXTENDED RELEASE ORAL PRN
Status: DISCONTINUED | OUTPATIENT
Start: 2025-05-29 | End: 2025-06-12 | Stop reason: HOSPADM

## 2025-05-29 RX ORDER — POLYETHYLENE GLYCOL 3350 17 G/17G
17 POWDER, FOR SOLUTION ORAL DAILY PRN
Status: DISCONTINUED | OUTPATIENT
Start: 2025-05-29 | End: 2025-06-12 | Stop reason: HOSPADM

## 2025-05-29 RX ORDER — PANTOPRAZOLE SODIUM 20 MG/1
20 TABLET, DELAYED RELEASE ORAL DAILY
Status: DISCONTINUED | OUTPATIENT
Start: 2025-05-29 | End: 2025-06-12 | Stop reason: HOSPADM

## 2025-05-29 RX ORDER — ATORVASTATIN CALCIUM 40 MG/1
40 TABLET, FILM COATED ORAL DAILY
Status: DISCONTINUED | OUTPATIENT
Start: 2025-05-29 | End: 2025-06-12 | Stop reason: HOSPADM

## 2025-05-29 RX ORDER — CARBIDOPA, LEVODOPA AND ENTACAPONE 50; 200; 200 MG/1; MG/1; MG/1
1 TABLET, FILM COATED ORAL 3 TIMES DAILY
Status: DISCONTINUED | OUTPATIENT
Start: 2025-05-29 | End: 2025-05-29

## 2025-05-29 RX ORDER — LATANOPROST 50 UG/ML
1 SOLUTION/ DROPS OPHTHALMIC NIGHTLY
Status: DISCONTINUED | OUTPATIENT
Start: 2025-05-29 | End: 2025-06-12 | Stop reason: HOSPADM

## 2025-05-29 RX ORDER — ONDANSETRON 2 MG/ML
4 INJECTION INTRAMUSCULAR; INTRAVENOUS ONCE
Status: COMPLETED | OUTPATIENT
Start: 2025-05-29 | End: 2025-05-29

## 2025-05-29 RX ORDER — SODIUM CHLORIDE 0.9 % (FLUSH) 0.9 %
5-40 SYRINGE (ML) INJECTION PRN
Status: DISCONTINUED | OUTPATIENT
Start: 2025-05-29 | End: 2025-06-12 | Stop reason: HOSPADM

## 2025-05-29 RX ADMIN — ENTACAPONE 200 MG: 200 TABLET, FILM COATED ORAL at 21:38

## 2025-05-29 RX ADMIN — ALLOPURINOL 100 MG: 100 TABLET ORAL at 21:37

## 2025-05-29 RX ADMIN — SODIUM CHLORIDE, PRESERVATIVE FREE 10 ML: 5 INJECTION INTRAVENOUS at 21:39

## 2025-05-29 RX ADMIN — TAMSULOSIN HYDROCHLORIDE 0.4 MG: 0.4 CAPSULE ORAL at 21:37

## 2025-05-29 RX ADMIN — GABAPENTIN 100 MG: 100 CAPSULE ORAL at 21:38

## 2025-05-29 RX ADMIN — ATORVASTATIN CALCIUM 40 MG: 40 TABLET, FILM COATED ORAL at 21:37

## 2025-05-29 RX ADMIN — CARBIDOPA AND LEVODOPA 2 TABLET: 25; 100 TABLET ORAL at 21:37

## 2025-05-29 RX ADMIN — SODIUM CHLORIDE 500 ML: 0.9 INJECTION, SOLUTION INTRAVENOUS at 13:57

## 2025-05-29 RX ADMIN — SUCRALFATE 1 G: 1 TABLET ORAL at 21:37

## 2025-05-29 RX ADMIN — ROPINIROLE HYDROCHLORIDE 0.5 MG: 0.25 TABLET, FILM COATED ORAL at 21:37

## 2025-05-29 RX ADMIN — LATANOPROST 1 DROP: 50 SOLUTION OPHTHALMIC at 21:38

## 2025-05-29 RX ADMIN — Medication 400 MG: at 21:37

## 2025-05-29 RX ADMIN — ONDANSETRON 4 MG: 2 INJECTION INTRAMUSCULAR; INTRAVENOUS at 13:57

## 2025-05-29 RX ADMIN — PANTOPRAZOLE SODIUM 20 MG: 20 TABLET, DELAYED RELEASE ORAL at 21:37

## 2025-05-29 ASSESSMENT — PAIN - FUNCTIONAL ASSESSMENT: PAIN_FUNCTIONAL_ASSESSMENT: 0-10

## 2025-05-29 ASSESSMENT — PAIN SCALES - GENERAL
PAINLEVEL_OUTOF10: 0
PAINLEVEL_OUTOF10: 6

## 2025-05-29 ASSESSMENT — PAIN DESCRIPTION - LOCATION: LOCATION: CHEST

## 2025-05-29 NOTE — ED PROVIDER NOTES
[facility]     Emergency Department     Faculty Attestation    I performed a history and physical examination of the patient and discussed management with the resident. I reviewed the resident's note and agree with the documented findings and plan of care. Any areas of disagreement are noted on the chart. I was personally present for the key portions of any procedures. I have documented in the chart those procedures where I was not present during the key portions. I have reviewed the emergency nurses triage note. I agree with the chief complaint, past medical history, past surgical history, allergies, medications, social and family history as documented unless otherwise noted below. For Physician Assistant/ Nurse Practitioner cases/documentation I have personally evaluated this patient and have completed at least one if not all key elements of the E/M (history, physical exam, and MDM). Additional findings are as noted.    Clinically dehydrated, mild epigastric discomfort, no lower extremity pain or swelling on examination, chest clear, heart regular rate and rhythm.       EKG Interpretation    Interpreted by emergency department physician    Rhythm: AV pacemaker  Rate: 82  Axis: Left -84 degree  Ectopy: none  Conduction: Paced      Clinical Impression: Paced rhythm    Richy Guerra, III     Richy Guerra MD  05/29/25 1570

## 2025-05-29 NOTE — CARE COORDINATION
Pt came to ED today requesting to return to [facility], pt was there as skilled and went home for holiday with family on Saturday and did not return to the facility. Pts son Delvin is at bedside and states they want short term only at this time. Referral was sent to [facility] via [employer], Spoke with Natalia from [facility] and received message from Kala at [facility] that pt will need reauth to return. PT/OT orders are placed please let Kala know when to start precert (await pt/ot eval)

## 2025-05-29 NOTE — ED PROVIDER NOTES
was performed using regadenoson (Lexiscan). PO caffeine given as a reversal agent. TECHNIQUE: Rest dose: 13 mCi Tc-99m sestamibi intravenously Stress dose: 38 mCi Tc-99m sestamibi intravenously Under cardiology supervision, 0.4 mg Lexiscan was infused intravenously prior to injection of the stress dose. SPECT imaging was acquired following injection of the sestamibi. ECG gating was obtained following the stress acquisition. COMPARISON: Similar exam 03/07/2024 HISTORY: ORDERING SYSTEM PROVIDED HISTORY: Angina FINDINGS: Perfusion: Image quality is good with no significant attenuation artifact. There are no fixed or reversible perfusion defects. Summed stress score: 0 Summed rest score: 0 Summed reversibility score: 0 Scores are visually adjusted for potential artifact. TID score: 1.05 (threshold value of 1.39 is used for Lexiscan stress with Tc-99m) Function: End diastolic volume: 51 mL Left ventricular ejection fraction: 67% Wall motion abnormalities: None.      Perfusion: Normal exam Function: Normal exam Risk stratification: Low     XR CHEST PORTABLE  Result Date: 5/2/2025  EXAMINATION: ONE XRAY VIEW OF THE CHEST 5/2/2025 2:13 pm COMPARISON: 04/26/2025 HISTORY: ORDERING SYSTEM PROVIDED HISTORY: chest pain TECHNOLOGIST PROVIDED HISTORY: chest pain FINDINGS: Transvenous pacer in place. The lungs are without acute focal process.  There is no effusion or pneumothorax. The cardiomediastinal silhouette is stable. The osseous structures are stable.     No acute process.       RECENT VITALS:     Temp: 99.4 °F (37.4 °C),  Pulse: 81, Respirations: 20, BP: (!) 136/99, SpO2: 97 %    Mina Gill is a 84 y.o. male who presents with complaint of Generalized weakness, clinical dehydration, inability to care for self safely at home.  History of Parkinson's.  Fluids, labs obtained.  Laboratory evaluation is stable.  Care coordination involved and we are unable to place an ECF from ED.  Due to significant patient safety risk and  unsafe discharge plan will admit to ETU with care coordination following. PT & OT consulted prior      OUTSTANDING TASKS / RECOMMENDATIONS:    Admission     FINAL IMPRESSION:     1. Generalized weakness        DISPOSITION:         DISPOSITION:  []  Discharge   []  Transfer -    [x]  Admission -  ETU   []  Against Medical Advice   []  Eloped   FOLLOW-UP: No follow-up provider specified.   DISCHARGE MEDICATIONS: New Prescriptions    No medications on file          Isidro Davis MD, FACEP, FAAEM  Attending Emergency Physician  [facility] EMERGENCY DEPARTMENT       Isidro Davis MD  05/29/25 2605

## 2025-05-29 NOTE — PROGRESS NOTES
Spiritual Health History and Assessment/Progress Note  [facility]    Initial Encounter,  ,  ,      Name: Mina Gill MRN: [id number]    Age: 84 y.o.     Sex: male   Language: English   Yarsanism: Zoroastrianism   Parkinson disease with dyskinesia (HCC)     Date: 5/29/2025            Total Time Calculated: (P) 15 min              Spiritual Assessment began in [facility] EMERGENCY DEPARTMENT        Referral/Consult From: Rounding   Encounter Overview/Reason: Initial Encounter  Service Provided For: Patient    Pt appeared receptive to  presence and engaged in conversation. Pt discussed health and hospital visit. Pt wants to get to bottom of health concerns. Pt stated symptoms come on all of a sudden and last for days. Pt has family event coming up and wants to feel better to enjoy celebration.    Melissa, Belief, Meaning:   Patient is connected with a melissa tradition or spiritual practice  Family/Friends No family/friends present      Importance and Influence:  Patient has no beliefs influential to healthcare decision-making identified during this visit  Family/Friends No family/friends present    Community:  Patient feels well-supported. Support system includes: Children and Extended family  Family/Friends No family/friends present    Assessment and Plan of Care:     Patient Interventions include: Other:  provided a supportive presence through active listening, prayer, and words of affirmation.  Family/Friends Interventions include: No family/friends present    Patient Plan of Care: Spiritual Care available upon further referral  Family/Friends Plan of Care: No family/friends present    Electronically signed by Chaplain Mahad on 5/29/2025 at 5:53 PM

## 2025-05-29 NOTE — ED NOTES
ED to inpatient nurses report      Chief Complaint:  Chief Complaint   Patient presents with    Dizziness    Chest Pain     Present to ED from: via EMS from home     MOA:     LOC: alert and orientated to name, place, date  Mobility: Requires assistance * 1  Oxygen Baseline: RA    Current needs required: RA   Pending ED orders:   Present condition: stable     Why did the patient come to the ED? Pt to ED via [facility] a/o x4 with c/o generalized weakness and chest pain.   Pt reports he started to feel ill last night. Pt stated he did throw up last night.   Pt reports hx of parkinsons. Pt reports he has been taking his daily meds.   Pt reports he is living with a friend that is helping take care of him.   Pt reports he had almost fallen multiple times at home.   Pt placed on full cardiac monitor, call light is in reach   What is the plan? Admit for weakness   Any procedures or intervention occur?   Any safety concerns?? Fall risk    Mental Status:  Level of Consciousness: Alert (0)    Psych Assessment:   Psychosocial  Psychosocial (WDL): Within Defined Limits  Vital signs   Vitals:    05/29/25 1218   BP: (!) 136/99   Pulse: 81   Resp: 20   Temp: 99.4 °F (37.4 °C)   SpO2: 97%        Vitals:  Patient Vitals for the past 24 hrs:   BP Temp Pulse Resp SpO2   05/29/25 1218 (!) 136/99 99.4 °F (37.4 °C) 81 20 97 %      Visit Vitals  BP (!) 136/99   Pulse 81   Temp 99.4 °F (37.4 °C)   Resp 20   SpO2 97%        LDAs:   Peripheral IV 05/29/25 Right Antecubital (Active)   Site Assessment Clean, dry & intact 05/29/25 1231   Line Status Blood return noted 05/29/25 1231       Ambulatory Status:  Presents to emergency department  because of falls (Syncope, seizure, or loss of consciousness): No, Age > 70: Yes, Impaired Mobility: Ambulates or transfers with assistive devices or assistance; Unable to ambulate or transer.: Yes, Nursing Judgement: Yes    Diagnosis:  DISPOSITION Admitted 05/29/2025 05:27:42 PM   Final diagnoses:

## 2025-05-29 NOTE — ED PROVIDER NOTES
Week: 3 days     Minutes of Exercise per Session: 20 min   Stress: Stress Concern Present (6/17/2022)    [employer] - Occupational Stress Questionnaire     Feeling of Stress : To some extent   Social Connections: Socially Isolated (6/17/2022)    Social Connection and Isolation Panel [NHANES]     Frequency of Communication with Friends and Family: Three times a week     Frequency of Social Gatherings with Friends and Family: Not on file     Attends Zoroastrianism Services: Never     Active Member of Clubs or Organizations: No     Attends Club or Organization Meetings: Never     Marital Status:    Intimate Partner Violence: Unknown (2/22/2024)    Received from [facility], [facility]     Fear of Current or Ex-Partner: Not on file     Emotionally Abused: Not on file     Physically Abused: Not on file     Sexually Abused: Not on file     Physically or Sexually Abused: Not on file   Housing Stability: Low Risk  (5/3/2025)    Housing Stability Vital Sign     Unable to Pay for Housing in the Last Year: No     Number of Times Moved in the Last Year: 0     Homeless in the Last Year: No       Family History   Problem Relation Age of Onset    No Known Problems Mother     No Known Problems Father        Allergies:  Aspirin and Iodine    Home Medications:  Prior to Admission medications    Medication Sig Start Date End Date Taking? Authorizing Provider   furosemide (LASIX) 20 MG tablet Take 1 tablet by mouth daily as needed (signs of fluid overload - weight gain, leg swelling, shortness of breath) 4/27/25 5/27/25  Suyapa Ruvalcaba, DO   Compression Stockings MISC by Does not apply route 4/26/25   Jonnie Gutierrez MD   latanoprost (XALATAN) 0.005 % ophthalmic solution INSTILL ONE (1) DROP IN BOTH EYES EVERY NIGHT AT BEDTIME 4/25/25   Tisha Glasgow MD   carbidopa-levodopa-entacapone (STALEVO 200) -200 MG TABS per tablet TAKE ONE (1)  tests: ordered.    Risk  Prescription drug management.        EKG  EKG Interpretation    Interpreted by me    Rhythm: normal sinus   Rate: normal  Axis: normal  Ectopy: none  Conduction: normal  ST Segments: no acute change  T Waves: no acute change  Q Waves: none    Clinical Impression: no acute changes and normal EKG    All EKG's are interpreted by the Emergency Department Physician who either signs or Co-signs this chart in the absence of a cardiologist.    EMERGENCY DEPARTMENT COURSE:  Patient seen and examined.  Patient is overall chronically ill-appearing, not in acute distress, nontoxic-appearing.  Does appear dehydrated clinically.  Patient here with numerous complaints which overall mostly appear to be chronic and unchanged from baseline, likely with dysautonomia secondary to Parkinson's disease per previous neurology evaluation.  Will obtain basic labs, lipase, troponin, EKG, chest x-ray.  Will give 500 mL normal saline and reassess, may require additional IV fluids.  Will give Zofran 4 mg IV for nausea.    ED Course as of 05/29/25 1716   Thu May 29, 2025   1232 POC Glucose(!): 177 [CH]   1315 CBC with Auto Differential(!):    WBC 5.0   RBC 4.71   Hemoglobin Quant 15.2   Hematocrit 43.7   MCV 92.8   MCH 32.3   MCHC 34.8   RDW 13.3   Platelet Count 205   MPV 9.0   NRBC Automated 0.0   Neutrophils % 66(!)   Lymphocyte % 25   Monocytes % 6   Eosinophils % 3   Basophils % 0   Immature Granulocytes % 0   Neutrophils Absolute 3.25   Lymphocytes Absolute 1.26   Monocytes Absolute 0.32   Eosinophils Absolute 0.13   Basophils Absolute <0.03   Immature Granulocytes Absolute <0.03  CBC without acute concerns. [CH]   1422 Lipase: 30 [CH]   1423 Troponin, High Sensitivity(!): 25  Troponin 25, consistent with prior labs, will trend troponin level. [CH]   1423 Comprehensive Metabolic Panel(!):    Sodium 135(!)   Potassium 3.6(!)   Chloride 97(!)   CARBON DIOXIDE 23   Anion Gap 15   Glucose 172(!)   BUN,BUNPL 12

## 2025-05-29 NOTE — CARE COORDINATION
Case Management Assessment  Initial Evaluation    Date/Time of Evaluation: 5/29/2025 7:17 PM  Assessment Completed by: Natalia Knott RN    If patient is discharged prior to next notation, then this note serves as note for discharge by case management.    Patient Name: Mina Gill                   YOB: 1940  Diagnosis: Generalized weakness [R53.1]  Parkinson disease with dyskinesia (HCC) [G20.B1]                   Date / Time: 5/29/2025 12:15 PM    Patient Admission Status: Observation   Readmission Risk (Low < 19, Mod (19-27), High > 27): Readmission Risk Score: 21.4    Current PCP: Tisha Glasgow MD  PCP verified by CM? (P) Yes    Chart Reviewed: Yes      History Provided by: (P) Patient  Patient Orientation: (P) Alert and Oriented    Patient Cognition: (P) Alert    Hospitalization in the last 30 days (Readmission):  No    If yes, Readmission Assessment in CM Navigator will be completed.    Advance Directives:      Code Status: Full Code   Patient's Primary Decision Maker is: (P) Named in Scanned ACP Document    Primary Decision Maker: GillDelvin rose - Child - [phone]    Secondary Decision Maker: GillIvone - Child - [phone]    Discharge Planning:    Patient lives with: (P) Friends Type of Home: (P) House  Primary Care Giver: (P) Self  Patient Support Systems include: (P) Children, Family Members, Friends/Neighbors   Current Financial resources: (P) Medicare, Medicaid  Current community resources:    Current services prior to admission: (P) Durable Medical Equipment            Current DME: (P) Walker, Wheelchair, Cane            Type of Home Care services:  (P) Nursing Services    ADLS  Prior functional level: (P) Independent in ADLs/IADLs  Current functional level: (P) Assistance with the following:    PT AM-PAC:   /24  OT AM-PAC:   /24    Family can provide assistance at DC: (P) No  Would you like Case Management to discuss the discharge plan with any other family

## 2025-05-29 NOTE — DISCHARGE INSTR - COC
Continuity of Care Form    Patient Name: Mina Gill   :  1940  MRN:  [id number]    Admit date:  2025  Discharge date:  25    Code Status Order: Prior   Advance Directives:     Admitting Physician:  No admitting provider for patient encounter.  PCP: Tisha Glasgow MD    Discharging Nurse: MARCO ANTONIO Pelayo   Discharging Hospital Unit/Room#:   Discharging Unit Phone Number: [phone]    Emergency Contact:   Extended Emergency Contact Information  Primary Emergency Contact: Delvin Gill  Address: [address] [place] [address] [place] [address]  Home Phone: [phone]  Relation: Child  Secondary Emergency Contact: GillIvone           [place], [place]  Home Phone: [phone]  Mobile Phone: [phone]  Relation: Child    Past Surgical History:  Past Surgical History:   Procedure Laterality Date    CARDIAC PROCEDURE N/A 2024    ali / Left heart cath / coronary angiography performed by Lobito [place] MD at [facility] CARDIAC CATH LAB    CARDIAC PROCEDURE N/A 2024    Insert temporary pacemaker performed by Guerita Jeffrey MD at [facility] CARDIAC CATH LAB    CHOLECYSTECTOMY, LAPAROSCOPIC N/A 10/29/2022    LAPROSCOPIC CHOLECYSTECTOMY performed by Cj Valencia DO at [facility] OR    COLONOSCOPY  2012    poor prep, tubular adenoma    COLONOSCOPY  2017    diverticulosis, multiple polyps as described, spot marking done, pathology-tubular adenoma x 4    EP DEVICE PROCEDURE N/A 2024    you / ppm implant / rm 3021 performed by Omid Stone MD at [facility] CARDIAC CATH LAB    ESOPHAGOGASTRODUODENOSCOPY  2023    DC COLSC FLX W/RMVL OF TUMOR POLYP LESION SNARE TQ N/A 2017    COLONOSCOPY POLYPECTOMY SNARE/COLD BIOPSY with tatooing and apc transverse colon performed by Ross Garcia MD at [facility] OR    SHOULDER SURGERY Right     rotator cuff    UPPER GASTROINTESTINAL ENDOSCOPY N/A 2023    EGD ESOPHAGOGASTRODUODENOSCOPY performed by Constantine Brown MD at [facility] OR

## 2025-05-29 NOTE — ED NOTES
Pt to ED via [facility] a/o x4 with c/o generalized weakness and chest pain.   Pt reports he started to feel ill last night. Pt stated he did throw up last night.   Pt reports hx of parkinsons. Pt reports he has been taking his daily meds.   Pt reports he is living with a friend that is helping take care of him.   Pt reports he had almost fallen multiple times at home.   Pt placed on full cardiac monitor, call light is in reach

## 2025-05-30 LAB
EKG ATRIAL RATE: 67 BPM
EKG ATRIAL RATE: 67 BPM
EKG P-R INTERVAL: 194 MS
EKG P-R INTERVAL: 194 MS
EKG Q-T INTERVAL: 454 MS
EKG Q-T INTERVAL: 454 MS
EKG QRS DURATION: 162 MS
EKG QRS DURATION: 162 MS
EKG QTC CALCULATION (BAZETT): 490 MS
EKG QTC CALCULATION (BAZETT): 490 MS
EKG R AXIS: -79 DEGREES
EKG R AXIS: -79 DEGREES
EKG T AXIS: 97 DEGREES
EKG T AXIS: 97 DEGREES
EKG VENTRICULAR RATE: 70 BPM
EKG VENTRICULAR RATE: 70 BPM

## 2025-05-30 PROCEDURE — G0378 HOSPITAL OBSERVATION PER HR: HCPCS

## 2025-05-30 PROCEDURE — 2500000003 HC RX 250 WO HCPCS: Performed by: EMERGENCY MEDICINE

## 2025-05-30 PROCEDURE — 93005 ELECTROCARDIOGRAM TRACING: CPT | Performed by: EMERGENCY MEDICINE

## 2025-05-30 PROCEDURE — 96372 THER/PROPH/DIAG INJ SC/IM: CPT

## 2025-05-30 PROCEDURE — 93005 ELECTROCARDIOGRAM TRACING: CPT | Performed by: STUDENT IN AN ORGANIZED HEALTH CARE EDUCATION/TRAINING PROGRAM

## 2025-05-30 PROCEDURE — 6370000000 HC RX 637 (ALT 250 FOR IP): Performed by: EMERGENCY MEDICINE

## 2025-05-30 PROCEDURE — 97166 OT EVAL MOD COMPLEX 45 MIN: CPT

## 2025-05-30 PROCEDURE — 93010 ELECTROCARDIOGRAM REPORT: CPT | Performed by: STUDENT IN AN ORGANIZED HEALTH CARE EDUCATION/TRAINING PROGRAM

## 2025-05-30 PROCEDURE — 6360000002 HC RX W HCPCS: Performed by: EMERGENCY MEDICINE

## 2025-05-30 PROCEDURE — 97535 SELF CARE MNGMENT TRAINING: CPT

## 2025-05-30 PROCEDURE — 97530 THERAPEUTIC ACTIVITIES: CPT

## 2025-05-30 PROCEDURE — 97162 PT EVAL MOD COMPLEX 30 MIN: CPT

## 2025-05-30 RX ADMIN — ATORVASTATIN CALCIUM 40 MG: 40 TABLET, FILM COATED ORAL at 09:39

## 2025-05-30 RX ADMIN — ENTACAPONE 200 MG: 200 TABLET, FILM COATED ORAL at 15:05

## 2025-05-30 RX ADMIN — ENTACAPONE 200 MG: 200 TABLET, FILM COATED ORAL at 20:53

## 2025-05-30 RX ADMIN — ALLOPURINOL 100 MG: 100 TABLET ORAL at 09:39

## 2025-05-30 RX ADMIN — SUCRALFATE 1 G: 1 TABLET ORAL at 20:53

## 2025-05-30 RX ADMIN — GABAPENTIN 100 MG: 100 CAPSULE ORAL at 09:39

## 2025-05-30 RX ADMIN — SUCRALFATE 1 G: 1 TABLET ORAL at 09:39

## 2025-05-30 RX ADMIN — CARBIDOPA AND LEVODOPA 2 TABLET: 25; 100 TABLET ORAL at 20:53

## 2025-05-30 RX ADMIN — ROPINIROLE HYDROCHLORIDE 0.5 MG: 0.25 TABLET, FILM COATED ORAL at 20:53

## 2025-05-30 RX ADMIN — PANTOPRAZOLE SODIUM 20 MG: 20 TABLET, DELAYED RELEASE ORAL at 09:39

## 2025-05-30 RX ADMIN — SUCRALFATE 1 G: 1 TABLET ORAL at 01:42

## 2025-05-30 RX ADMIN — TAMSULOSIN HYDROCHLORIDE 0.4 MG: 0.4 CAPSULE ORAL at 09:39

## 2025-05-30 RX ADMIN — ENTACAPONE 200 MG: 200 TABLET, FILM COATED ORAL at 09:39

## 2025-05-30 RX ADMIN — Medication 400 MG: at 09:39

## 2025-05-30 RX ADMIN — CARBIDOPA AND LEVODOPA 2 TABLET: 25; 100 TABLET ORAL at 09:39

## 2025-05-30 RX ADMIN — LATANOPROST 1 DROP: 50 SOLUTION OPHTHALMIC at 20:53

## 2025-05-30 RX ADMIN — ENOXAPARIN SODIUM 40 MG: 100 INJECTION SUBCUTANEOUS at 00:09

## 2025-05-30 RX ADMIN — ACETAMINOPHEN 650 MG: 325 TABLET ORAL at 06:34

## 2025-05-30 RX ADMIN — SUCRALFATE 1 G: 1 TABLET ORAL at 15:05

## 2025-05-30 RX ADMIN — ENOXAPARIN SODIUM 40 MG: 100 INJECTION SUBCUTANEOUS at 20:53

## 2025-05-30 RX ADMIN — SODIUM CHLORIDE, PRESERVATIVE FREE 10 ML: 5 INJECTION INTRAVENOUS at 20:53

## 2025-05-30 RX ADMIN — CLOPIDOGREL BISULFATE 75 MG: 75 TABLET, FILM COATED ORAL at 00:09

## 2025-05-30 RX ADMIN — ROPINIROLE HYDROCHLORIDE 0.5 MG: 0.25 TABLET, FILM COATED ORAL at 15:05

## 2025-05-30 RX ADMIN — ROPINIROLE HYDROCHLORIDE 0.5 MG: 0.25 TABLET, FILM COATED ORAL at 09:39

## 2025-05-30 RX ADMIN — CARBIDOPA AND LEVODOPA 2 TABLET: 25; 100 TABLET ORAL at 15:05

## 2025-05-30 RX ADMIN — CLOPIDOGREL BISULFATE 75 MG: 75 TABLET, FILM COATED ORAL at 09:39

## 2025-05-30 ASSESSMENT — PAIN SCALES - GENERAL
PAINLEVEL_OUTOF10: 3
PAINLEVEL_OUTOF10: 5

## 2025-05-30 NOTE — H&P
smoking. He has been exposed to tobacco smoke. He has never used smokeless tobacco. He reports that he does not drink alcohol and does not use drugs.  I have reviewed and agree with all Social.  There are no concerns for substance abuse/use.    PHYSICAL EXAM     INITIAL VITALS:  height is 1.702 m (5' 7\") and weight is 81.6 kg (180 lb). His temperature is 98.1 °F (36.7 °C). His blood pressure is 120/62 and his pulse is 70. His respiration is 17 and oxygen saturation is 98%.      CONSTITUTIONAL: AOx4, no apparent distress, appears stated age    HEAD: normocephalic, atraumatic   EYES: PERRLA, EOMI    ENT: moist mucous membranes, uvula midline   NECK: supple, symmetric   BACK: symmetric   LUNGS: clear to auscultation bilaterally   CARDIOVASCULAR: regular rate and rhythm, no murmurs, rubs or gallops   ABDOMEN: soft, non-tender, non-distended with normal active bowel sounds   NEUROLOGIC:  MAEx4, no focal sensory or motor deficits, parkinsonism tremor in the upper extremities noticed on exam-chronic   MUSCULOSKELETAL: no clubbing, cyanosis or edema   SKIN: no rash or wounds       DIFFERENTIAL DIAGNOSIS/MDM:     FROM ED MEDICAL DECISION MAKING NOTE:   Patient seen and examined.  Patient is overall chronically ill-appearing, not in acute distress, nontoxic-appearing.  Does appear dehydrated clinically.  Patient here with numerous complaints which overall mostly appear to be chronic and unchanged from baseline, likely with dysautonomia secondary to Parkinson's disease per previous neurology evaluation.  Will obtain basic labs, lipase, troponin, EKG, chest x-ray.  Will give 500 mL normal saline and reassess, may require additional IV fluids.  Will give Zofran 4 mg IV for nausea.         ED Course as of 05/29/25 1716   Thu May 29, 2025   1232 POC Glucose(!): 177 [CH]   1315 CBC with Auto Differential(!):    WBC 5.0   RBC 4.71   Hemoglobin Quant 15.2   Hematocrit 43.7   MCV 92.8   MCH 32.3   MCHC 34.8   RDW 13.3   Platelet Count

## 2025-05-30 NOTE — PROGRESS NOTES
Spiritual Health History and Assessment/Progress Note  [facility]    (P) Follow-up,  ,  ,      Name: Mina Gill MRN: [id number]    Age: 84 y.o.     Sex: male   Language: English   Sabianist: Mandaeism   Parkinson disease with dyskinesia (HCC)     Date: 5/30/2025            Total Time Calculated: (P) 10 min              Spiritual Assessment continued in [facility] OBSERVATION UNIT        Referral/Consult From: (P) Patient   Encounter Overview/Reason: (P) Follow-up  Service Provided For: (P) Patient    Melissa, Belief, Meaning:   Patient is connected with a melissa tradition or spiritual practice and has beliefs or practices that help with coping during difficult times  Family/Friends No family/friends present      Importance and Influence:  Patient has no beliefs influential to healthcare decision-making identified during this visit  Family/Friends No family/friends present    Community:  Patient is connected with a spiritual community and feels well-supported. Support system includes: Spouse/Partner and Children  Family/Friends No family/friends present    Assessment and Plan of Care:     Patient Interventions include: Other:  provided a supportive presence. Patient discussed health and hospital visit and is hopeful of getting to bottom of health concerns.  Family/Friends Interventions include: No family/friends present    Patient Plan of Care: Spiritual Care available upon further referral  Family/Friends Plan of Care: No family/friends present    Electronically signed by Chaplain Mahad on 5/30/2025 at 7:05 PM

## 2025-05-30 NOTE — PROGRESS NOTES
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  Past Surgical History:  has a past surgical history that includes Colonoscopy (2012); shoulder surgery (Right); pr colsc flx w/rmvl of tumor polyp lesion snare tq (N/A, 2017); Colonoscopy (2017); Cholecystectomy, laparoscopic (N/A, 10/29/2022); Esophagogastroduodenoscopy (2023); Upper gastrointestinal endoscopy (N/A, 2023); Upper gastrointestinal endoscopy (N/A, 2023); Cardiac procedure (N/A, 2024); Cardiac procedure (N/A, 2024); and ep device procedure (N/A, 2024).    Discharge Recommendations  Discharge Recommendations: Continue to assess pending progress, Patient would benefit from continued therapy after discharge  OT Equipment Recommendations  Equipment Needed: No    Assessment  Performance deficits / Impairments: Decreased functional mobility ;Decreased ADL status;Decreased ROM;Decreased strength;Decreased safe awareness;Decreased endurance;Decreased balance;Decreased high-level IADLs;Decreased coordination  Assessment: Patient is normaly independent with functional mobility, personal ADLs and household tasks  agreeable to OT Evaluation/Treatment  General Comment  Comments: RN cleared for OT Evaluation/Treatement  Pain  Pre-Pain: 8  Post-Pain: 8  Pain Location: Head  Pain Descriptor: Aching  Pain Interventions: Repositioning;Rest       Home Setup/Prior Level of Function  Social/Functional History  Lives With: Friend(s);Family  Type of Home: House  Home Layout: Two level;Work area in basement  Home Access: Stairs to enter with rails  Entrance Stairs - Number of Steps: 6  Entrance Stairs - Rails: Both  Bathroom Equipment: Shower chair;Grab bars in shower;Grab bars around toilet  Home Equipment: Walker - Rolling;Rollator;Cane;Wheelchair - Manual  Receives Help From: Friend(s);Family  Prior Level of Assist for ADLs: Needs assistance  Prior Level of Assist for Homemaking: Needs assistance  Homemaking Responsibilities: No  Prior Level of Assist for Transfers: Independent  Active : No  Patient's  Info: friend  Mode of Transportation: Family  Occupation: Retired    Vision/Hearing  Vision  Vision: Impaired  Vision Exceptions: Wears glasses for distance  Hearing  Hearing: Exceptions to WFL  Hearing Exceptions: Hard of hearing/hearing concerns    BUE Assessment  Gross Assessment  Strength: Generally decreased, functional (BUE strength grossly 3/5)  Tone: Abnormal (+B hand tremors)  Sensation: Impaired  Hand Dominance: Right     Objective  Orientation  Overall Orientation Status: Within Functional Limits  Cognition  Overall Cognitive Status: Exceptions  Arousal/Alertness: Delayed responses to stimuli  Following Commands: Follows one step commands with repetition;Follows one step commands with increased time  Attention Span: Attends with cues to redirect  Problem Solving: Assistance required to generate solutions;Assistance required to correct errors made;Assistance required to identify errors made  Insights: Decreased awareness of deficits  Initiation: Requires cues for some  Sequencing: Requires cues for

## 2025-05-30 NOTE — PROGRESS NOTES
Patient stated he does not want to eat right now.  Nurse aide Shanon providing assistance with feeding as needed for patient.

## 2025-05-30 NOTE — PROGRESS NOTES
Physical Therapy  Facility/Department: [facility] OBSERVATION UNIT   Physical Therapy Initial Evaluation    Patient Name: Mina Gill        MRN: [id number]    : 1940    Date of Service: 2025    Chief Complaint   Patient presents with    Dizziness    Chest Pain     Mina Gill is a 84 y.o. male who presents with generalized fatigue, malaise, myalgias, intermittent chest pain, epigastric abdominal discomfort, nausea.  States symptoms been ongoing for several months.  Recently admitted for same, chest pain deemed noncardiac and patient found to have orthostasis likely secondary to his known Parkinson disease.  Patient was started on midodrine and discharged to [facility].  Patient states he left the ECF some days ago to attend a family gathering and has been staying at home.  States he feels he needs to go back to the care facility, states his son spoke with the care facility but it is unclear what this discussion consisted of or when it took place.  States chest pain is intermittent, ongoing for several months, reproducible with palpation, located over the left chest by his pacemaker site, not related to exertion at rest.  Reports significant lightheadedness and weakness when trying to stand up after sitting or laying down.     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), CKD (chronic kidney disease) stage 2, GFR 60-89 ml/min, CKD (chronic kidney disease) stage 3, GFR 30-59 ml/min ([facility]), Diverticulosis of colon, GERD (gastroesophageal reflux disease), History of non-ST elevation myocardial infarction (NSTEMI) 2022, Impaired renal function, MRSA (methicillin resistant staph aureus) culture positive, Osteoarthritis of knee, Parkinson disease ([facility]), Proteinuria, Skull fracture ([facility]), Temporary loss of eyesight, and Tubular adenoma of colon.  Past Surgical History:  has a past surgical history that includes Colonoscopy (2012); shoulder surgery

## 2025-05-30 NOTE — CARE COORDINATION
Notified Kala at [facility] via [facility] that CMA will start precert

## 2025-05-30 NOTE — PROGRESS NOTES
[facility]  CDU / OBSERVATION ENCOUNTER  ATTENDING NOTE         I performed a history and physical examination of the patient and discussed management with the resident or midlevel provider. I reviewed the resident or midlevel provider's note and agree with the documented findings and plan of care. Any areas of disagreement are noted on the chart. I was personally present for the key portions of any procedures. I have documented in the chart those procedures where I was not present during the key portions. I have reviewed the nurses notes. I agree with the chief complaint, past medical history, past surgical history, allergies, medications, social and family history as documented unless otherwise noted below.    The Family history, social history, and ROS are effectively unchanged since admission unless noted elsewhere in the chart.     This patient was placed in the observation unit for reevaluation for possible admission to the hospital     Patient took a prolonged furlough from the ECF and lost his bed privileges when he did not return in the expected time.  Patient has been brought back to the emergency department due to inability to care for self at home.  Patient will require ongoing care and in a supervised setting.  Patient has debilitating disease to the point of requiring constant supportive therapy       Frank Kirby MD  Attending Emergency  Physician

## 2025-05-30 NOTE — CARE COORDINATION
TRANSITIONAL PLANNING/Auth initiated via [facility] for [facility], Auth ID# [id number] Auth is pending.

## 2025-05-31 LAB
EKG ATRIAL RATE: 81 BPM
EKG Q-T INTERVAL: 420 MS
EKG QRS DURATION: 162 MS
EKG QTC CALCULATION (BAZETT): 490 MS
EKG R AXIS: -84 DEGREES
EKG T AXIS: 91 DEGREES
EKG VENTRICULAR RATE: 82 BPM

## 2025-05-31 PROCEDURE — 96376 TX/PRO/DX INJ SAME DRUG ADON: CPT

## 2025-05-31 PROCEDURE — 97530 THERAPEUTIC ACTIVITIES: CPT

## 2025-05-31 PROCEDURE — 6370000000 HC RX 637 (ALT 250 FOR IP): Performed by: EMERGENCY MEDICINE

## 2025-05-31 PROCEDURE — 6360000002 HC RX W HCPCS: Performed by: EMERGENCY MEDICINE

## 2025-05-31 PROCEDURE — 96372 THER/PROPH/DIAG INJ SC/IM: CPT

## 2025-05-31 PROCEDURE — 97116 GAIT TRAINING THERAPY: CPT

## 2025-05-31 PROCEDURE — 2500000003 HC RX 250 WO HCPCS: Performed by: EMERGENCY MEDICINE

## 2025-05-31 PROCEDURE — 93010 ELECTROCARDIOGRAM REPORT: CPT | Performed by: INTERNAL MEDICINE

## 2025-05-31 PROCEDURE — G0378 HOSPITAL OBSERVATION PER HR: HCPCS

## 2025-05-31 RX ADMIN — CARBIDOPA AND LEVODOPA 2 TABLET: 25; 100 TABLET ORAL at 09:18

## 2025-05-31 RX ADMIN — ONDANSETRON 4 MG: 2 INJECTION INTRAMUSCULAR; INTRAVENOUS at 07:54

## 2025-05-31 RX ADMIN — SUCRALFATE 1 G: 1 TABLET ORAL at 02:56

## 2025-05-31 RX ADMIN — ROPINIROLE HYDROCHLORIDE 0.5 MG: 0.25 TABLET, FILM COATED ORAL at 16:54

## 2025-05-31 RX ADMIN — CLOPIDOGREL BISULFATE 75 MG: 75 TABLET, FILM COATED ORAL at 09:18

## 2025-05-31 RX ADMIN — SUCRALFATE 1 G: 1 TABLET ORAL at 09:18

## 2025-05-31 RX ADMIN — SUCRALFATE 1 G: 1 TABLET ORAL at 21:14

## 2025-05-31 RX ADMIN — ROPINIROLE HYDROCHLORIDE 0.5 MG: 0.25 TABLET, FILM COATED ORAL at 09:18

## 2025-05-31 RX ADMIN — ENOXAPARIN SODIUM 40 MG: 100 INJECTION SUBCUTANEOUS at 09:18

## 2025-05-31 RX ADMIN — Medication 400 MG: at 09:18

## 2025-05-31 RX ADMIN — ENTACAPONE 200 MG: 200 TABLET, FILM COATED ORAL at 21:14

## 2025-05-31 RX ADMIN — CARBIDOPA AND LEVODOPA 2 TABLET: 25; 100 TABLET ORAL at 21:15

## 2025-05-31 RX ADMIN — ENTACAPONE 200 MG: 200 TABLET, FILM COATED ORAL at 16:54

## 2025-05-31 RX ADMIN — ENTACAPONE 200 MG: 200 TABLET, FILM COATED ORAL at 09:17

## 2025-05-31 RX ADMIN — LATANOPROST 1 DROP: 50 SOLUTION OPHTHALMIC at 21:15

## 2025-05-31 RX ADMIN — GABAPENTIN 100 MG: 100 CAPSULE ORAL at 09:18

## 2025-05-31 RX ADMIN — PANTOPRAZOLE SODIUM 20 MG: 20 TABLET, DELAYED RELEASE ORAL at 09:18

## 2025-05-31 RX ADMIN — ACETAMINOPHEN 650 MG: 325 TABLET ORAL at 16:55

## 2025-05-31 RX ADMIN — ROPINIROLE HYDROCHLORIDE 0.5 MG: 0.25 TABLET, FILM COATED ORAL at 21:14

## 2025-05-31 RX ADMIN — SODIUM CHLORIDE, PRESERVATIVE FREE 10 ML: 5 INJECTION INTRAVENOUS at 21:15

## 2025-05-31 RX ADMIN — SODIUM CHLORIDE, PRESERVATIVE FREE 10 ML: 5 INJECTION INTRAVENOUS at 07:55

## 2025-05-31 RX ADMIN — TAMSULOSIN HYDROCHLORIDE 0.4 MG: 0.4 CAPSULE ORAL at 09:25

## 2025-05-31 RX ADMIN — ALLOPURINOL 100 MG: 100 TABLET ORAL at 09:18

## 2025-05-31 RX ADMIN — ONDANSETRON 4 MG: 2 INJECTION INTRAMUSCULAR; INTRAVENOUS at 22:34

## 2025-05-31 RX ADMIN — ATORVASTATIN CALCIUM 40 MG: 40 TABLET, FILM COATED ORAL at 09:19

## 2025-05-31 RX ADMIN — CARBIDOPA AND LEVODOPA 2 TABLET: 25; 100 TABLET ORAL at 16:55

## 2025-05-31 RX ADMIN — SUCRALFATE 1 G: 1 TABLET ORAL at 16:55

## 2025-05-31 ASSESSMENT — PAIN SCALES - GENERAL
PAINLEVEL_OUTOF10: 4
PAINLEVEL_OUTOF10: 0
PAINLEVEL_OUTOF10: 6

## 2025-05-31 ASSESSMENT — PAIN DESCRIPTION - LOCATION: LOCATION: HEAD

## 2025-05-31 ASSESSMENT — PAIN DESCRIPTION - DESCRIPTORS: DESCRIPTORS: ACHING

## 2025-05-31 NOTE — PLAN OF CARE
Problem: Discharge Planning  Goal: Discharge to home or other facility with appropriate resources  5/30/2025 2045 by Tory Odom RN  Outcome: Progressing  5/30/2025 1038 by Glen Lujan RN  Outcome: Progressing     Problem: Pain  Goal: Verbalizes/displays adequate comfort level or baseline comfort level  5/30/2025 2045 by Tory Odom RN  Outcome: Progressing  5/30/2025 1038 by Glen Lujan, RN  Outcome: Progressing     Problem: Safety - Adult  Goal: Free from fall injury  5/30/2025 2045 by Tory Odom RN  Outcome: Progressing  5/30/2025 1038 by Glen Lujan, RN  Outcome: Progressing

## 2025-05-31 NOTE — PROGRESS NOTES
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  Past Surgical History:  has a past surgical history that includes Colonoscopy (2012); shoulder surgery (Right); pr colsc flx w/rmvl of tumor polyp lesion snare tq (N/A, 2017); Colonoscopy (2017); Cholecystectomy, laparoscopic (N/A, 10/29/2022); Esophagogastroduodenoscopy (2023); Upper gastrointestinal endoscopy (N/A, 2023); Upper gastrointestinal endoscopy (N/A, 2023); Cardiac procedure (N/A, 2024); Cardiac procedure (N/A, 2024); and ep device procedure (N/A, 2024).    Discharge Recommendations  Discharge Recommendations: Patient would benefit from continued therapy after discharge  PT Equipment Recommendations  Equipment Needed: No    Assessment  Body Structures, Functions, Activity Limitations Requiring Skilled Therapeutic Intervention: Decreased functional mobility ;Decreased ADL status;Decreased body mechanics;Decreased safe awareness;Decreased strength;Decreased coordination;Decreased high-level IADLs;Decreased balance;Increased pain;Decreased posture;Decreased endurance  Assessment: Patient required ModA for bed

## 2025-05-31 NOTE — PROGRESS NOTES
OBS/CDU   Progress NOTE      Patients PCP is:  Tisha Glasgow MD        SUBJECTIVE      No acute events overnight. Complains of some nausea this morning but otherwise no complaints of CP, SOB, N/V, or abdominal pain. Still awaiting authorization approval for ECF placement.     PHYSICAL EXAM      General: NAD, AO X 3  Heent: EOMI, PERRL  Neck: SUPPLE, NO JVD  Cardiovascular: RRR, S1S2  Pulmonary: CTAB, NO SOB  Abdomen: SOFT, NTTP, ND, +BS  Extremities: +2/4 PULSES DISTAL, NO SWELLING  Neuro / Psych: NO NUMBNESS OR TINGLING, MENTATION AT BASELINE    PERTINENT TEST /EXAMS      I have reviewed all available laboratory results.    MEDICATIONS CURRENT   nitroGLYCERIN (NITROSTAT) SL tablet 0.4 mg, Q5 Min PRN  clopidogrel (PLAVIX) tablet 75 mg, Daily  tamsulosin (FLOMAX) capsule 0.4 mg, Daily  atorvastatin (LIPITOR) tablet 40 mg, Daily  fluticasone (FLONASE) 50 MCG/ACT nasal spray 2 spray, Daily PRN  magnesium oxide (MAG-OX) tablet 400 mg, Daily  albuterol sulfate HFA (PROVENTIL;VENTOLIN;PROAIR) 108 (90 Base) MCG/ACT inhaler 2 puff, Q6H PRN  pantoprazole (PROTONIX) tablet 20 mg, Daily  allopurinol (ZYLOPRIM) tablet 100 mg, Daily  sucralfate (CARAFATE) tablet 1 g, 4 times per day  rOPINIRole (REQUIP) tablet 0.5 mg, TID  latanoprost (XALATAN) 0.005 % ophthalmic solution 1 drop, Nightly  lidocaine 4 % external patch 1 patch, Daily  furosemide (LASIX) tablet 20 mg, Daily PRN  gabapentin (NEURONTIN) capsule 100 mg, Daily  sodium chloride flush 0.9 % injection 5-40 mL, 2 times per day  sodium chloride flush 0.9 % injection 5-40 mL, PRN  0.9 % sodium chloride infusion, PRN  potassium chloride (KLOR-CON M) extended release tablet 40 mEq, PRN   Or  potassium bicarb-citric acid (EFFER-K) effervescent tablet 40 mEq, PRN   Or  potassium chloride 10 mEq/100 mL IVPB (Peripheral Line), PRN  enoxaparin (LOVENOX) injection 40 mg, Daily  ondansetron (ZOFRAN) injection 4 mg, Q4H PRN  polyethylene glycol (GLYCOLAX) packet 17 g, Daily  PRN  acetaminophen (TYLENOL) tablet 650 mg, Q6H PRN   Or  acetaminophen (TYLENOL) suppository 650 mg, Q6H PRN  entacapone (COMTAN) tablet 200 mg, TID   And  carbidopa-levodopa (SINEMET)  MG per tablet 2 tablet, TID        All medication charted and reviewed.    CONSULTS      IP CONSULT TO CASE MANAGEMENT    ASSESSMENT/PLAN        Mina iGll is a 84 y.o. male who presents with chronic generalized weakness, intermittent chest pain, headache, generalized malaise.  Patient was recently admitted to the service for ECF placement and after successful placement was achieved with insurance authorization after peer to peer, he was discharged appropriately.  According to the patient he had a extended furlough from the [facility] location for the Memorial Day holiday weekend and had been staying with family and when attempting to return to [facility], was not allowed.  Workup in the emergency department was unremarkable for any new pathology, he is still at his baseline weakness and mentation status.  He was admitted observation for reevaluation with PT and OT for approval to return back to the assisted care facility.     PT/OT  Case management:  Assistance with placing the patient back into ECF care  Authorization currently pending  Continue home medications and pain control  Monitor vitals, labs, and imaging  DISPO: pending consults and clinical improvement    --  Mich Milan, DO  Observation Physician     This dictation was generated by voice recognition computer software.  Although all attempts are made to edit the dictation for accuracy, there may be errors in the transcription that are not intended.

## 2025-06-01 LAB — GLUCOSE BLD-MCNC: 282 MG/DL (ref 75–110)

## 2025-06-01 PROCEDURE — 96376 TX/PRO/DX INJ SAME DRUG ADON: CPT

## 2025-06-01 PROCEDURE — 96372 THER/PROPH/DIAG INJ SC/IM: CPT

## 2025-06-01 PROCEDURE — 82947 ASSAY GLUCOSE BLOOD QUANT: CPT

## 2025-06-01 PROCEDURE — 6360000002 HC RX W HCPCS: Performed by: EMERGENCY MEDICINE

## 2025-06-01 PROCEDURE — 2500000003 HC RX 250 WO HCPCS: Performed by: EMERGENCY MEDICINE

## 2025-06-01 PROCEDURE — 6370000000 HC RX 637 (ALT 250 FOR IP): Performed by: EMERGENCY MEDICINE

## 2025-06-01 PROCEDURE — G0378 HOSPITAL OBSERVATION PER HR: HCPCS

## 2025-06-01 RX ADMIN — ENTACAPONE 200 MG: 200 TABLET, FILM COATED ORAL at 20:26

## 2025-06-01 RX ADMIN — ENTACAPONE 200 MG: 200 TABLET, FILM COATED ORAL at 13:54

## 2025-06-01 RX ADMIN — ATORVASTATIN CALCIUM 40 MG: 40 TABLET, FILM COATED ORAL at 08:36

## 2025-06-01 RX ADMIN — SUCRALFATE 1 G: 1 TABLET ORAL at 20:27

## 2025-06-01 RX ADMIN — SODIUM CHLORIDE, PRESERVATIVE FREE 10 ML: 5 INJECTION INTRAVENOUS at 20:50

## 2025-06-01 RX ADMIN — POLYETHYLENE GLYCOL 3350 17 G: 17 POWDER, FOR SOLUTION ORAL at 13:54

## 2025-06-01 RX ADMIN — TAMSULOSIN HYDROCHLORIDE 0.4 MG: 0.4 CAPSULE ORAL at 08:36

## 2025-06-01 RX ADMIN — CLOPIDOGREL BISULFATE 75 MG: 75 TABLET, FILM COATED ORAL at 08:36

## 2025-06-01 RX ADMIN — Medication 400 MG: at 08:37

## 2025-06-01 RX ADMIN — NITROGLYCERIN 0.4 MG: 0.4 TABLET SUBLINGUAL at 20:36

## 2025-06-01 RX ADMIN — ALLOPURINOL 100 MG: 100 TABLET ORAL at 08:36

## 2025-06-01 RX ADMIN — CARBIDOPA AND LEVODOPA 2 TABLET: 25; 100 TABLET ORAL at 20:25

## 2025-06-01 RX ADMIN — LATANOPROST 1 DROP: 50 SOLUTION OPHTHALMIC at 20:26

## 2025-06-01 RX ADMIN — ENOXAPARIN SODIUM 40 MG: 100 INJECTION SUBCUTANEOUS at 08:35

## 2025-06-01 RX ADMIN — ENTACAPONE 200 MG: 200 TABLET, FILM COATED ORAL at 08:36

## 2025-06-01 RX ADMIN — ROPINIROLE HYDROCHLORIDE 0.5 MG: 0.25 TABLET, FILM COATED ORAL at 13:54

## 2025-06-01 RX ADMIN — PANTOPRAZOLE SODIUM 20 MG: 20 TABLET, DELAYED RELEASE ORAL at 08:37

## 2025-06-01 RX ADMIN — ROPINIROLE HYDROCHLORIDE 0.5 MG: 0.25 TABLET, FILM COATED ORAL at 08:36

## 2025-06-01 RX ADMIN — GABAPENTIN 100 MG: 100 CAPSULE ORAL at 08:36

## 2025-06-01 RX ADMIN — ACETAMINOPHEN 650 MG: 325 TABLET ORAL at 05:40

## 2025-06-01 RX ADMIN — SUCRALFATE 1 G: 1 TABLET ORAL at 03:22

## 2025-06-01 RX ADMIN — SUCRALFATE 1 G: 1 TABLET ORAL at 08:36

## 2025-06-01 RX ADMIN — SODIUM CHLORIDE, PRESERVATIVE FREE 10 ML: 5 INJECTION INTRAVENOUS at 08:37

## 2025-06-01 RX ADMIN — FUROSEMIDE 20 MG: 20 TABLET ORAL at 08:37

## 2025-06-01 RX ADMIN — SUCRALFATE 1 G: 1 TABLET ORAL at 13:54

## 2025-06-01 RX ADMIN — CARBIDOPA AND LEVODOPA 2 TABLET: 25; 100 TABLET ORAL at 08:36

## 2025-06-01 RX ADMIN — CARBIDOPA AND LEVODOPA 2 TABLET: 25; 100 TABLET ORAL at 13:54

## 2025-06-01 RX ADMIN — ROPINIROLE HYDROCHLORIDE 0.5 MG: 0.25 TABLET, FILM COATED ORAL at 20:26

## 2025-06-01 RX ADMIN — ONDANSETRON 4 MG: 2 INJECTION INTRAMUSCULAR; INTRAVENOUS at 05:40

## 2025-06-01 RX ADMIN — ACETAMINOPHEN 650 MG: 325 TABLET ORAL at 20:18

## 2025-06-01 ASSESSMENT — PAIN - FUNCTIONAL ASSESSMENT: PAIN_FUNCTIONAL_ASSESSMENT: PREVENTS OR INTERFERES SOME ACTIVE ACTIVITIES AND ADLS

## 2025-06-01 ASSESSMENT — PAIN SCALES - GENERAL
PAINLEVEL_OUTOF10: 6

## 2025-06-01 ASSESSMENT — PAIN SCALES - WONG BAKER
WONGBAKER_NUMERICALRESPONSE: NO HURT
WONGBAKER_NUMERICALRESPONSE: NO HURT
WONGBAKER_NUMERICALRESPONSE: HURTS EVEN MORE
WONGBAKER_NUMERICALRESPONSE: NO HURT

## 2025-06-01 ASSESSMENT — PAIN DESCRIPTION - DESCRIPTORS
DESCRIPTORS: TIGHTNESS;SHARP
DESCRIPTORS: THROBBING

## 2025-06-01 ASSESSMENT — PAIN DESCRIPTION - ORIENTATION
ORIENTATION: ANTERIOR
ORIENTATION: MID

## 2025-06-01 ASSESSMENT — PAIN DESCRIPTION - LOCATION
LOCATION: CHEST
LOCATION: HEAD
LOCATION: CHEST;HEAD

## 2025-06-01 NOTE — PROGRESS NOTES
[facility]  CDU / OBSERVATION eNCOUnter  Attending NOte       I performed a history and physical examination of the patient and discussed management with the resident.  This patient was placed in the observation unit for reevaluation for possible admission to the hospital. I reviewed the resident’s note and agree with the documented findings and plan of care. Any areas of disagreement are noted on the chart. I was personally present for the key portions of any procedures. I have documented in the chart those procedures where I was not present during the key portions. I have reviewed the nurses notes. I agree with the chief complaint, past medical history, past surgical history, allergies, medications, social and family history as documented unless otherwise noted below.    The patient was placed in the observation unit for reevaluation for possible admission to the hospital.      The Family history, social history, and ROS are effectively unchanged since admission unless noted elsewhere in the chart.    No acute events overnight. Patient has no complaints today. Plan for placement.     Jesika Jacob MD  Attending Emergency  Physician

## 2025-06-01 NOTE — PROGRESS NOTES
[facility]  CDU / OBSERVATION ENCOUNTER  ATTENDING NOTE         I performed a history and physical examination of the patient and discussed management with the resident or midlevel provider. I reviewed the resident or midlevel provider's note and agree with the documented findings and plan of care. Any areas of disagreement are noted on the chart. I was personally present for the key portions of any procedures. I have documented in the chart those procedures where I was not present during the key portions. I have reviewed the nurses notes. I agree with the chief complaint, past medical history, past surgical history, allergies, medications, social and family history as documented unless otherwise noted below.    The Family history, social history, and ROS are effectively unchanged since admission unless noted elsewhere in the chart.     This patient was placed in the observation unit for reevaluation for possible admission to the hospital     Patient with debilitating Parkinson's unable to care for self at home.  Patient with ongoing need for care.  Patient was on furlough from ECF but lost bed privileges and needs reassessment.  Patient for reevaluation with PT OT.  As last visit patient is here for approximately 12 days awaiting for preapproval and peer to peer reassessment.  Anticipating ongoing need for supportive therapy, further neurologic assessment and reevaluation.       Frank Kirby MD  Attending Emergency  Physician

## 2025-06-01 NOTE — PLAN OF CARE
Problem: Discharge Planning  Goal: Discharge to home or other facility with appropriate resources  6/1/2025 1928 by Aditi Montana, RN  Outcome: Progressing  6/1/2025 1521 by Jenn Dumont, RN  Outcome: Progressing     Problem: Pain  Goal: Verbalizes/displays adequate comfort level or baseline comfort level  6/1/2025 1928 by Aditi Montana, RN  Outcome: Progressing  6/1/2025 1521 by Jenn Dumont, RN  Outcome: Progressing     Problem: Safety - Adult  Goal: Free from fall injury  6/1/2025 1928 by Aditi Montana, RN  Outcome: Progressing  6/1/2025 1521 by Jenn Dumont, RN  Outcome: Progressing

## 2025-06-01 NOTE — PROGRESS NOTES
OBS/CDU   RESIDENT NOTE      Patients PCP is:  Tisha Glasgow MD        SUBJECTIVE      Patient seen and examined. No acute events overnight. Patient was watching TV upon entry into the room, in no acute distress.  Patient denies any acute complaints this morning.  He is at his baseline.    PHYSICAL EXAM      General: Alert, resting comfortably in no acute distress  Heent: mucus membranes moist  Neck: normal ROM  Cardiovascular: regular rate and rhythm  Pulmonary: normal effort on room air, no respiratory distress  Extremities: Moving all extremity spontaneously  Neuro / Psych: No new focal neurological deficits, A&O x3     PERTINENT TEST /EXAMS      I have reviewed all available laboratory results.    MEDICATIONS CURRENT   nitroGLYCERIN (NITROSTAT) SL tablet 0.4 mg, Q5 Min PRN  clopidogrel (PLAVIX) tablet 75 mg, Daily  tamsulosin (FLOMAX) capsule 0.4 mg, Daily  atorvastatin (LIPITOR) tablet 40 mg, Daily  fluticasone (FLONASE) 50 MCG/ACT nasal spray 2 spray, Daily PRN  magnesium oxide (MAG-OX) tablet 400 mg, Daily  albuterol sulfate HFA (PROVENTIL;VENTOLIN;PROAIR) 108 (90 Base) MCG/ACT inhaler 2 puff, Q6H PRN  pantoprazole (PROTONIX) tablet 20 mg, Daily  allopurinol (ZYLOPRIM) tablet 100 mg, Daily  sucralfate (CARAFATE) tablet 1 g, 4 times per day  rOPINIRole (REQUIP) tablet 0.5 mg, TID  latanoprost (XALATAN) 0.005 % ophthalmic solution 1 drop, Nightly  lidocaine 4 % external patch 1 patch, Daily  furosemide (LASIX) tablet 20 mg, Daily PRN  gabapentin (NEURONTIN) capsule 100 mg, Daily  sodium chloride flush 0.9 % injection 5-40 mL, 2 times per day  sodium chloride flush 0.9 % injection 5-40 mL, PRN  0.9 % sodium chloride infusion, PRN  potassium chloride (KLOR-CON M) extended release tablet 40 mEq, PRN   Or  potassium bicarb-citric acid (EFFER-K) effervescent tablet 40 mEq, PRN   Or  potassium chloride 10 mEq/100 mL IVPB (Peripheral Line), PRN  enoxaparin (LOVENOX) injection 40 mg, Daily  ondansetron (ZOFRAN)  injection 4 mg, Q4H PRN  polyethylene glycol (GLYCOLAX) packet 17 g, Daily PRN  acetaminophen (TYLENOL) tablet 650 mg, Q6H PRN   Or  acetaminophen (TYLENOL) suppository 650 mg, Q6H PRN  entacapone (COMTAN) tablet 200 mg, TID   And  carbidopa-levodopa (SINEMET)  MG per tablet 2 tablet, TID        All medication charted and reviewed.    CONSULTS      IP CONSULT TO CASE MANAGEMENT    ASSESSMENT/PLAN       Mina Gill is a 84 y.o. male who presents with chronic complaints.  He endorses intermittent generalized weakness, chest pain, headache, and generalized malaise.  Patient was recently admitted to the observation service for ECF placement from 5/2 - 5/12. Successful placement was achieved with insurance authorization after peer to peer, and he was discharged appropriately.  Over Memorial Day holiday weekend, patient stated with family; when attempting to return to [facility], he was not allowed.  Given that patient had no safe place to go, he was taken back to the ED.  His workup was unremarkable for any new pathology, patient is still at his baseline weakness and mentation status.  He was admitted observation for reevaluation with PT and OT for approval to return back to the [facility] care facility.     Patient with no acute medical complaints  PT/OT  Regular diet  Continue home medications and pain control  Monitor vitals, labs, and imaging    DISPO: pending safe discharge    --  Suyapa Ruvalcaba,   Emergency Medicine Resident Physician     This dictation was generated by voice recognition computer software.  Although all attempts are made to edit the dictation for accuracy, there may be errors in the transcription that are not intended.

## 2025-06-02 PROBLEM — E11.65 HYPERGLYCEMIA DUE TO DIABETES MELLITUS (HCC): Status: ACTIVE | Noted: 2025-06-02

## 2025-06-02 LAB
ALBUMIN SERPL-MCNC: 4.1 G/DL (ref 3.5–5.2)
ALBUMIN/GLOB SERPL: 1.4 {RATIO} (ref 1–2.5)
ALP SERPL-CCNC: 122 U/L (ref 40–129)
ALT SERPL-CCNC: 6 U/L (ref 10–50)
ANION GAP SERPL CALCULATED.3IONS-SCNC: 11 MMOL/L (ref 9–16)
AST SERPL-CCNC: 28 U/L (ref 10–50)
BASOPHILS # BLD: <0.03 K/UL (ref 0–0.2)
BASOPHILS NFR BLD: 0 % (ref 0–2)
BILIRUB SERPL-MCNC: 0.6 MG/DL (ref 0–1.2)
BUN SERPL-MCNC: 18 MG/DL (ref 8–23)
CALCIUM SERPL-MCNC: 9.8 MG/DL (ref 8.6–10.4)
CHLORIDE SERPL-SCNC: 99 MMOL/L (ref 98–107)
CO2 SERPL-SCNC: 23 MMOL/L (ref 20–31)
CREAT SERPL-MCNC: 1.4 MG/DL (ref 0.7–1.2)
EOSINOPHIL # BLD: 0.22 K/UL (ref 0–0.44)
EOSINOPHILS RELATIVE PERCENT: 3 % (ref 1–4)
ERYTHROCYTE [DISTWIDTH] IN BLOOD BY AUTOMATED COUNT: 13.5 % (ref 11.8–14.4)
EST. AVERAGE GLUCOSE BLD GHB EST-MCNC: 200 MG/DL
GFR, ESTIMATED: 49 ML/MIN/1.73M2
GLUCOSE BLD-MCNC: 165 MG/DL (ref 75–110)
GLUCOSE BLD-MCNC: 169 MG/DL (ref 75–110)
GLUCOSE BLD-MCNC: 296 MG/DL (ref 75–110)
GLUCOSE BLD-MCNC: 333 MG/DL (ref 75–110)
GLUCOSE SERPL-MCNC: 198 MG/DL (ref 74–99)
HBA1C MFR BLD: 8.6 % (ref 4–6)
HCT VFR BLD AUTO: 43.8 % (ref 40.7–50.3)
HGB BLD-MCNC: 14.7 G/DL (ref 13–17)
IMM GRANULOCYTES # BLD AUTO: <0.03 K/UL (ref 0–0.3)
IMM GRANULOCYTES NFR BLD: 0 %
LIPASE SERPL-CCNC: 22 U/L (ref 13–60)
LYMPHOCYTES NFR BLD: 1.26 K/UL (ref 1.1–3.7)
LYMPHOCYTES RELATIVE PERCENT: 18 % (ref 24–43)
MCH RBC QN AUTO: 32.1 PG (ref 25.2–33.5)
MCHC RBC AUTO-ENTMCNC: 33.6 G/DL (ref 28.4–34.8)
MCV RBC AUTO: 95.6 FL (ref 82.6–102.9)
MONOCYTES NFR BLD: 0.53 K/UL (ref 0.1–1.2)
MONOCYTES NFR BLD: 8 % (ref 3–12)
NEUTROPHILS NFR BLD: 71 % (ref 36–65)
NEUTS SEG NFR BLD: 4.79 K/UL (ref 1.5–8.1)
NRBC BLD-RTO: 0 PER 100 WBC
PLATELET # BLD AUTO: 174 K/UL (ref 138–453)
PMV BLD AUTO: 8.5 FL (ref 8.1–13.5)
POTASSIUM SERPL-SCNC: 4.5 MMOL/L (ref 3.7–5.3)
PROT SERPL-MCNC: 7.1 G/DL (ref 6.6–8.7)
RBC # BLD AUTO: 4.58 M/UL (ref 4.21–5.77)
SODIUM SERPL-SCNC: 133 MMOL/L (ref 136–145)
WBC OTHER # BLD: 6.8 K/UL (ref 3.5–11.3)

## 2025-06-02 PROCEDURE — 80053 COMPREHEN METABOLIC PANEL: CPT

## 2025-06-02 PROCEDURE — 85025 COMPLETE CBC W/AUTO DIFF WBC: CPT

## 2025-06-02 PROCEDURE — 83690 ASSAY OF LIPASE: CPT

## 2025-06-02 PROCEDURE — 1200000000 HC SEMI PRIVATE

## 2025-06-02 PROCEDURE — 6360000002 HC RX W HCPCS: Performed by: EMERGENCY MEDICINE

## 2025-06-02 PROCEDURE — 36415 COLL VENOUS BLD VENIPUNCTURE: CPT

## 2025-06-02 PROCEDURE — 97110 THERAPEUTIC EXERCISES: CPT

## 2025-06-02 PROCEDURE — 6370000000 HC RX 637 (ALT 250 FOR IP)

## 2025-06-02 PROCEDURE — 83036 HEMOGLOBIN GLYCOSYLATED A1C: CPT

## 2025-06-02 PROCEDURE — 2500000003 HC RX 250 WO HCPCS: Performed by: EMERGENCY MEDICINE

## 2025-06-02 PROCEDURE — G0378 HOSPITAL OBSERVATION PER HR: HCPCS

## 2025-06-02 PROCEDURE — 82947 ASSAY GLUCOSE BLOOD QUANT: CPT

## 2025-06-02 PROCEDURE — 6370000000 HC RX 637 (ALT 250 FOR IP): Performed by: EMERGENCY MEDICINE

## 2025-06-02 PROCEDURE — 96376 TX/PRO/DX INJ SAME DRUG ADON: CPT

## 2025-06-02 PROCEDURE — 97530 THERAPEUTIC ACTIVITIES: CPT

## 2025-06-02 RX ORDER — INSULIN LISPRO 100 [IU]/ML
0-4 INJECTION, SOLUTION INTRAVENOUS; SUBCUTANEOUS
Status: DISCONTINUED | OUTPATIENT
Start: 2025-06-02 | End: 2025-06-03

## 2025-06-02 RX ORDER — INSULIN LISPRO 100 [IU]/ML
0-4 INJECTION, SOLUTION INTRAVENOUS; SUBCUTANEOUS EVERY 6 HOURS
Status: DISCONTINUED | OUTPATIENT
Start: 2025-06-02 | End: 2025-06-02

## 2025-06-02 RX ORDER — GLUCAGON 1 MG/ML
1 KIT INJECTION PRN
Status: DISCONTINUED | OUTPATIENT
Start: 2025-06-02 | End: 2025-06-12 | Stop reason: HOSPADM

## 2025-06-02 RX ORDER — DEXTROSE MONOHYDRATE 100 MG/ML
INJECTION, SOLUTION INTRAVENOUS CONTINUOUS PRN
Status: DISCONTINUED | OUTPATIENT
Start: 2025-06-02 | End: 2025-06-12 | Stop reason: HOSPADM

## 2025-06-02 RX ORDER — CALCIUM CARBONATE 500 MG/1
500 TABLET, CHEWABLE ORAL 3 TIMES DAILY PRN
Status: DISCONTINUED | OUTPATIENT
Start: 2025-06-02 | End: 2025-06-12 | Stop reason: HOSPADM

## 2025-06-02 RX ADMIN — SODIUM CHLORIDE, PRESERVATIVE FREE 10 ML: 5 INJECTION INTRAVENOUS at 20:42

## 2025-06-02 RX ADMIN — ENTACAPONE 200 MG: 200 TABLET, FILM COATED ORAL at 08:55

## 2025-06-02 RX ADMIN — GABAPENTIN 100 MG: 100 CAPSULE ORAL at 08:51

## 2025-06-02 RX ADMIN — ROPINIROLE HYDROCHLORIDE 0.5 MG: 0.25 TABLET, FILM COATED ORAL at 20:42

## 2025-06-02 RX ADMIN — SUCRALFATE 1 G: 1 TABLET ORAL at 14:53

## 2025-06-02 RX ADMIN — SODIUM CHLORIDE, PRESERVATIVE FREE 10 ML: 5 INJECTION INTRAVENOUS at 14:05

## 2025-06-02 RX ADMIN — ACETAMINOPHEN 650 MG: 325 TABLET ORAL at 06:17

## 2025-06-02 RX ADMIN — ANTACID TABLETS 500 MG: 500 TABLET, CHEWABLE ORAL at 20:42

## 2025-06-02 RX ADMIN — CLOPIDOGREL BISULFATE 75 MG: 75 TABLET, FILM COATED ORAL at 08:50

## 2025-06-02 RX ADMIN — SUCRALFATE 1 G: 1 TABLET ORAL at 20:42

## 2025-06-02 RX ADMIN — Medication 400 MG: at 08:51

## 2025-06-02 RX ADMIN — TAMSULOSIN HYDROCHLORIDE 0.4 MG: 0.4 CAPSULE ORAL at 08:51

## 2025-06-02 RX ADMIN — ALLOPURINOL 100 MG: 100 TABLET ORAL at 08:51

## 2025-06-02 RX ADMIN — LATANOPROST 1 DROP: 50 SOLUTION OPHTHALMIC at 20:44

## 2025-06-02 RX ADMIN — CARBIDOPA AND LEVODOPA 2 TABLET: 25; 100 TABLET ORAL at 14:45

## 2025-06-02 RX ADMIN — ONDANSETRON 4 MG: 2 INJECTION INTRAMUSCULAR; INTRAVENOUS at 14:05

## 2025-06-02 RX ADMIN — INSULIN LISPRO 3 UNITS: 100 INJECTION, SOLUTION INTRAVENOUS; SUBCUTANEOUS at 12:18

## 2025-06-02 RX ADMIN — SUCRALFATE 1 G: 1 TABLET ORAL at 03:11

## 2025-06-02 RX ADMIN — CARBIDOPA AND LEVODOPA 2 TABLET: 25; 100 TABLET ORAL at 08:51

## 2025-06-02 RX ADMIN — ONDANSETRON 4 MG: 2 INJECTION INTRAMUSCULAR; INTRAVENOUS at 22:54

## 2025-06-02 RX ADMIN — SUCRALFATE 1 G: 1 TABLET ORAL at 08:53

## 2025-06-02 RX ADMIN — ENTACAPONE 200 MG: 200 TABLET, FILM COATED ORAL at 14:45

## 2025-06-02 RX ADMIN — ROPINIROLE HYDROCHLORIDE 0.5 MG: 0.25 TABLET, FILM COATED ORAL at 08:51

## 2025-06-02 RX ADMIN — CARBIDOPA AND LEVODOPA 2 TABLET: 25; 100 TABLET ORAL at 20:42

## 2025-06-02 RX ADMIN — PANTOPRAZOLE SODIUM 20 MG: 20 TABLET, DELAYED RELEASE ORAL at 08:56

## 2025-06-02 RX ADMIN — INSULIN LISPRO 2 UNITS: 100 INJECTION, SOLUTION INTRAVENOUS; SUBCUTANEOUS at 20:42

## 2025-06-02 RX ADMIN — ENTACAPONE 200 MG: 200 TABLET, FILM COATED ORAL at 20:44

## 2025-06-02 RX ADMIN — POLYETHYLENE GLYCOL 3350 17 G: 17 POWDER, FOR SOLUTION ORAL at 14:05

## 2025-06-02 RX ADMIN — ATORVASTATIN CALCIUM 40 MG: 40 TABLET, FILM COATED ORAL at 08:51

## 2025-06-02 RX ADMIN — SODIUM CHLORIDE, PRESERVATIVE FREE 10 ML: 5 INJECTION INTRAVENOUS at 08:53

## 2025-06-02 RX ADMIN — ROPINIROLE HYDROCHLORIDE 0.5 MG: 0.25 TABLET, FILM COATED ORAL at 14:49

## 2025-06-02 ASSESSMENT — PAIN SCALES - WONG BAKER
WONGBAKER_NUMERICALRESPONSE: NO HURT

## 2025-06-02 ASSESSMENT — PAIN DESCRIPTION - ORIENTATION: ORIENTATION: ANTERIOR

## 2025-06-02 ASSESSMENT — PAIN DESCRIPTION - DESCRIPTORS: DESCRIPTORS: SORE;THROBBING

## 2025-06-02 ASSESSMENT — PAIN SCALES - GENERAL
PAINLEVEL_OUTOF10: 6
PAINLEVEL_OUTOF10: 0

## 2025-06-02 ASSESSMENT — PAIN DESCRIPTION - LOCATION: LOCATION: HEAD

## 2025-06-02 ASSESSMENT — PAIN - FUNCTIONAL ASSESSMENT: PAIN_FUNCTIONAL_ASSESSMENT: PREVENTS OR INTERFERES SOME ACTIVE ACTIVITIES AND ADLS

## 2025-06-02 NOTE — PROGRESS NOTES
Spiritual Health History and Assessment/Progress Note  [facility]    (P) Follow-up,  ,  ,      Name: Mina Gill MRN: [id number]    Age: 84 y.o.     Sex: male   Language: English   Alevism: Jewish   Parkinson disease with dyskinesia (HCC)     Date: 6/2/2025            Total Time Calculated: (P) 10 min              Spiritual Assessment began in [facility] OBSERVATION UNIT        Referral/Consult From: (P) Other    Encounter Overview/Reason: (P) Follow-up  Service Provided For: (P) Patient     met with patient for spiritual care visit. Pt was watching televised mass, which prompted discussion about melissa. Pt feels supported by his son, who he says will visit him later. Pt is connected with a parish.  could not understand the name of the parish. Pt says he likes the  at that Gnosticism.  asked if pt would like him to say a short prayer. Pt agreed.  offered a short prayer. Pt thanked him for his time.      Melissa, Belief, Meaning:   Patient identifies as spiritual, is connected with a melissa tradition or spiritual practice, and has beliefs or practices that help with coping during difficult times  Family/Friends No family/friends present      Importance and Influence:  Patient unable to assess at this time  Family/Friends No family/friends present    Community:  Patient is connected with a spiritual community and feels well-supported. Support system includes: Children  Family/Friends No family/friends present    Assessment and Plan of Care:     Patient Interventions include: Facilitated expression of thoughts and feelings, Explored spiritual coping/struggle/distress, and Affirmed coping skills/support systems  Family/Friends Interventions include: No family/friends present    Patient Plan of Care: Spiritual Care available upon further referral  Family/Friends Plan of Care: No family/friends present    Electronically signed by Chaplain Senthil Intern on  6/2/2025 at 12:33 PM

## 2025-06-02 NOTE — CARE COORDINATION
Spoke with Kala at [facility] they will not have a bed until Saturday 6/7. Spoke with Delvin patel son, he does not have a preference on snfs as long as it is located in [place] or [place]. Spoke with Mami at [facility] she will review and call writer back. Spoke with Mami from [facility], pt is accepted. Ariela [facility] assist informed of change in snf destination, she will make changes for prior auth. Pt had been approved for [facility]

## 2025-06-02 NOTE — PROGRESS NOTES
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  Past Surgical History:  has a past surgical history that includes Colonoscopy (2012); shoulder surgery (Right); pr colsc flx w/rmvl of tumor polyp lesion snare tq (N/A, 2017); Colonoscopy (2017); Cholecystectomy, laparoscopic (N/A, 10/29/2022); Esophagogastroduodenoscopy (2023); Upper gastrointestinal endoscopy (N/A, 2023); Upper gastrointestinal endoscopy (N/A, 2023); Cardiac procedure (N/A, 2024); Cardiac procedure (N/A, 2024); and ep device procedure (N/A, 2024).    Discharge Recommendations  Discharge Recommendations: Patient would benefit from continued therapy after discharge  PT Equipment Recommendations  Equipment Needed: No    Assessment  Body Structures, Functions, Activity Limitations Requiring Skilled Therapeutic Intervention: Decreased functional mobility ;Decreased ADL status;Decreased body mechanics;Decreased safe awareness;Decreased strength;Decreased coordination;Decreased high-level IADLs;Decreased balance;Increased pain;Decreased posture;Decreased endurance  Assessment: Pt able to perform bed mobility

## 2025-06-03 LAB
GLUCOSE BLD-MCNC: 173 MG/DL (ref 75–110)
GLUCOSE BLD-MCNC: 176 MG/DL (ref 75–110)
GLUCOSE BLD-MCNC: 252 MG/DL (ref 75–110)
GLUCOSE BLD-MCNC: 285 MG/DL (ref 75–110)

## 2025-06-03 PROCEDURE — 6370000000 HC RX 637 (ALT 250 FOR IP): Performed by: EMERGENCY MEDICINE

## 2025-06-03 PROCEDURE — 2500000003 HC RX 250 WO HCPCS: Performed by: EMERGENCY MEDICINE

## 2025-06-03 PROCEDURE — 1200000000 HC SEMI PRIVATE

## 2025-06-03 PROCEDURE — 6370000000 HC RX 637 (ALT 250 FOR IP)

## 2025-06-03 PROCEDURE — 82947 ASSAY GLUCOSE BLOOD QUANT: CPT

## 2025-06-03 PROCEDURE — 6360000002 HC RX W HCPCS: Performed by: EMERGENCY MEDICINE

## 2025-06-03 PROCEDURE — 6370000000 HC RX 637 (ALT 250 FOR IP): Performed by: NURSE PRACTITIONER

## 2025-06-03 RX ORDER — INSULIN LISPRO 100 [IU]/ML
0-4 INJECTION, SOLUTION INTRAVENOUS; SUBCUTANEOUS
Status: DISCONTINUED | OUTPATIENT
Start: 2025-06-03 | End: 2025-06-03

## 2025-06-03 RX ORDER — INSULIN LISPRO 100 [IU]/ML
0-4 INJECTION, SOLUTION INTRAVENOUS; SUBCUTANEOUS
Status: DISCONTINUED | OUTPATIENT
Start: 2025-06-03 | End: 2025-06-06

## 2025-06-03 RX ORDER — INSULIN LISPRO 100 [IU]/ML
0-4 INJECTION, SOLUTION INTRAVENOUS; SUBCUTANEOUS EVERY 6 HOURS
Status: DISCONTINUED | OUTPATIENT
Start: 2025-06-03 | End: 2025-06-03

## 2025-06-03 RX ADMIN — ATORVASTATIN CALCIUM 40 MG: 40 TABLET, FILM COATED ORAL at 08:26

## 2025-06-03 RX ADMIN — SUCRALFATE 1 G: 1 TABLET ORAL at 17:52

## 2025-06-03 RX ADMIN — Medication 5 MG: at 22:44

## 2025-06-03 RX ADMIN — ACETAMINOPHEN 650 MG: 325 TABLET ORAL at 06:23

## 2025-06-03 RX ADMIN — CLOPIDOGREL BISULFATE 75 MG: 75 TABLET, FILM COATED ORAL at 08:25

## 2025-06-03 RX ADMIN — TAMSULOSIN HYDROCHLORIDE 0.4 MG: 0.4 CAPSULE ORAL at 08:26

## 2025-06-03 RX ADMIN — SODIUM CHLORIDE, PRESERVATIVE FREE 10 ML: 5 INJECTION INTRAVENOUS at 08:27

## 2025-06-03 RX ADMIN — PANTOPRAZOLE SODIUM 20 MG: 20 TABLET, DELAYED RELEASE ORAL at 08:26

## 2025-06-03 RX ADMIN — NITROGLYCERIN 0.4 MG: 0.4 TABLET SUBLINGUAL at 08:28

## 2025-06-03 RX ADMIN — SUCRALFATE 1 G: 1 TABLET ORAL at 05:28

## 2025-06-03 RX ADMIN — ONDANSETRON 4 MG: 2 INJECTION INTRAMUSCULAR; INTRAVENOUS at 21:55

## 2025-06-03 RX ADMIN — ROPINIROLE HYDROCHLORIDE 0.5 MG: 0.25 TABLET, FILM COATED ORAL at 20:54

## 2025-06-03 RX ADMIN — SODIUM CHLORIDE, PRESERVATIVE FREE 10 ML: 5 INJECTION INTRAVENOUS at 20:55

## 2025-06-03 RX ADMIN — ROPINIROLE HYDROCHLORIDE 0.5 MG: 0.25 TABLET, FILM COATED ORAL at 08:25

## 2025-06-03 RX ADMIN — SUCRALFATE 1 G: 1 TABLET ORAL at 22:44

## 2025-06-03 RX ADMIN — FUROSEMIDE 20 MG: 20 TABLET ORAL at 08:25

## 2025-06-03 RX ADMIN — LATANOPROST 1 DROP: 50 SOLUTION OPHTHALMIC at 20:54

## 2025-06-03 RX ADMIN — CARBIDOPA AND LEVODOPA 2 TABLET: 25; 100 TABLET ORAL at 08:26

## 2025-06-03 RX ADMIN — POLYETHYLENE GLYCOL 3350 17 G: 17 POWDER, FOR SOLUTION ORAL at 17:58

## 2025-06-03 RX ADMIN — Medication 400 MG: at 08:25

## 2025-06-03 RX ADMIN — ENTACAPONE 200 MG: 200 TABLET, FILM COATED ORAL at 08:26

## 2025-06-03 RX ADMIN — CARBIDOPA AND LEVODOPA 2 TABLET: 25; 100 TABLET ORAL at 20:54

## 2025-06-03 RX ADMIN — CARBIDOPA AND LEVODOPA 2 TABLET: 25; 100 TABLET ORAL at 13:42

## 2025-06-03 RX ADMIN — SUCRALFATE 1 G: 1 TABLET ORAL at 13:30

## 2025-06-03 RX ADMIN — ENTACAPONE 200 MG: 200 TABLET, FILM COATED ORAL at 13:42

## 2025-06-03 RX ADMIN — ROPINIROLE HYDROCHLORIDE 0.5 MG: 0.25 TABLET, FILM COATED ORAL at 13:42

## 2025-06-03 RX ADMIN — ALLOPURINOL 100 MG: 100 TABLET ORAL at 08:26

## 2025-06-03 RX ADMIN — GABAPENTIN 100 MG: 100 CAPSULE ORAL at 08:26

## 2025-06-03 RX ADMIN — ENTACAPONE 200 MG: 200 TABLET, FILM COATED ORAL at 20:54

## 2025-06-03 RX ADMIN — INSULIN LISPRO 2 UNITS: 100 INJECTION, SOLUTION INTRAVENOUS; SUBCUTANEOUS at 20:55

## 2025-06-03 RX ADMIN — ACETAMINOPHEN 650 MG: 325 TABLET ORAL at 15:54

## 2025-06-03 RX ADMIN — ANTACID TABLETS 500 MG: 500 TABLET, CHEWABLE ORAL at 20:54

## 2025-06-03 RX ADMIN — INSULIN LISPRO 2 UNITS: 100 INJECTION, SOLUTION INTRAVENOUS; SUBCUTANEOUS at 17:52

## 2025-06-03 ASSESSMENT — PAIN DESCRIPTION - DESCRIPTORS
DESCRIPTORS: ACHING
DESCRIPTORS: ACHING

## 2025-06-03 ASSESSMENT — PAIN - FUNCTIONAL ASSESSMENT: PAIN_FUNCTIONAL_ASSESSMENT: PREVENTS OR INTERFERES SOME ACTIVE ACTIVITIES AND ADLS

## 2025-06-03 ASSESSMENT — PAIN DESCRIPTION - ORIENTATION
ORIENTATION: MID
ORIENTATION: OTHER (COMMENT)

## 2025-06-03 ASSESSMENT — PAIN SCALES - GENERAL
PAINLEVEL_OUTOF10: 2
PAINLEVEL_OUTOF10: 9
PAINLEVEL_OUTOF10: 8
PAINLEVEL_OUTOF10: 6
PAINLEVEL_OUTOF10: 5

## 2025-06-03 ASSESSMENT — PAIN DESCRIPTION - LOCATION
LOCATION: CHEST
LOCATION: GENERALIZED

## 2025-06-03 NOTE — CARE COORDINATION
6/3 unable to complete a new authorization for [facility] via [employer] portal. Sent a message regarding SNF change on 6/2, there has been no reply. Left a vm for CM.

## 2025-06-03 NOTE — PROGRESS NOTES
[facility]  CDU / OBSERVATION ENCOUNTER  ATTENDING NOTE         I performed a history and physical examination of the patient and discussed management with the resident or midlevel provider. I reviewed the resident or midlevel provider's note and agree with the documented findings and plan of care. Any areas of disagreement are noted on the chart. I was personally present for the key portions of any procedures. I have documented in the chart those procedures where I was not present during the key portions. I have reviewed the nurses notes. I agree with the chief complaint, past medical history, past surgical history, allergies, medications, social and family history as documented unless otherwise noted below.    The Family history, social history, and ROS are effectively unchanged since admission unless noted elsewhere in the chart.     This patient was placed in the observation unit for reevaluation for possible admission to the hospital     Patient with no new complaint but ongoing difficulty with Parkinson's and movement disorder.  Patient has noted to have a trend of increasing blood sugars requiring more aggressive control.  Hemoglobin A1c was checked and is 8.5.  Patient is being treated with insulin sliding scale.  Patient will need better glucose control and should have closer monitoring while in hospital.  Patient being moved to inpatient floor while awaiting placement for ECF.       Frank Kirby MD  Attending Emergency  Physician

## 2025-06-03 NOTE — PLAN OF CARE
Problem: Discharge Planning  Goal: Discharge to home or other facility with appropriate resources  6/3/2025 0531 by Tory Odom RN  Outcome: Progressing  6/2/2025 1741 by Mio Reyes RN  Outcome: Progressing     Problem: Pain  Goal: Verbalizes/displays adequate comfort level or baseline comfort level  6/3/2025 0531 by Tory Odom RN  Outcome: Progressing  6/2/2025 1741 by Mio Reyes RN  Outcome: Progressing     Problem: Safety - Adult  Goal: Free from fall injury  6/3/2025 0531 by Tory Odom RN  Outcome: Progressing  6/2/2025 1741 by Mio Reyes RN  Outcome: Progressing     Problem: Skin/Tissue Integrity  Goal: Skin integrity remains intact  Description: 1.  Monitor for areas of redness and/or skin breakdown2.  Assess vascular access sites hourly3.  Every 4-6 hours minimum:  Change oxygen saturation probe site4.  Every 4-6 hours:  If on nasal continuous positive airway pressure, respiratory therapy assess nares and determine need for appliance change or resting period  6/3/2025 0531 by Tory Odom RN  Outcome: Progressing  6/2/2025 1741 by Mio Reyse RN  Outcome: Progressing

## 2025-06-03 NOTE — PROGRESS NOTES
[facility]  CDU / OBSERVATION ENCOUNTER  ATTENDING NOTE         I performed a history and physical examination of the patient and discussed management with the resident or midlevel provider. I reviewed the resident or midlevel provider's note and agree with the documented findings and plan of care. Any areas of disagreement are noted on the chart. I was personally present for the key portions of any procedures. I have documented in the chart those procedures where I was not present during the key portions. I have reviewed the nurses notes. I agree with the chief complaint, past medical history, past surgical history, allergies, medications, social and family history as documented unless otherwise noted below.    The Family history, social history, and ROS are effectively unchanged since admission unless noted elsewhere in the chart.     This patient was placed in the observation unit for reevaluation for possible admission to the hospital     Patient still requiring glucose monitoring and need for ECF placement.  Patient had prolonged stay previously due to difficulties with insurance preapproval.  Patient is currently having similar issues now.  Awaiting prior authorizations.       Frank Kirby MD  Attending Emergency  Physician

## 2025-06-03 NOTE — PLAN OF CARE
Problem: Discharge Planning  Goal: Discharge to home or other facility with appropriate resources  6/3/2025 1515 by Abiola Ervin RN  Outcome: Progressing  6/3/2025 0531 by Tory Odom RN  Outcome: Progressing     Problem: Pain  Goal: Verbalizes/displays adequate comfort level or baseline comfort level  6/3/2025 1515 by Abiola Ervin RN  Outcome: Progressing  6/3/2025 0531 by Tory Odom RN  Outcome: Progressing     Problem: Safety - Adult  Goal: Free from fall injury  6/3/2025 1515 by Abiola Ervin RN  Outcome: Progressing  6/3/2025 0531 by Tory Odom RN  Outcome: Progressing     Problem: Skin/Tissue Integrity  Goal: Skin integrity remains intact  Description: 1.  Monitor for areas of redness and/or skin breakdown2.  Assess vascular access sites hourly3.  Every 4-6 hours minimum:  Change oxygen saturation probe site4.  Every 4-6 hours:  If on nasal continuous positive airway pressure, respiratory therapy assess nares and determine need for appliance change or resting period  6/3/2025 1515 by Abiola Ervin RN  Outcome: Progressing  6/3/2025 0531 by Tory Odom RN  Outcome: Progressing     Problem: Chronic Conditions and Co-morbidities  Goal: Patient's chronic conditions and co-morbidity symptoms are monitored and maintained or improved  Outcome: Progressing

## 2025-06-03 NOTE — CARE COORDINATION
Spoke with Mami from [facility] this am, they will start precert for [facility] but current request for [facility] has to be removed

## 2025-06-03 NOTE — PROGRESS NOTES
OBS/CDU   RESIDENT NOTE      Patients PCP is:  Tisha Glasgow MD        SUBJECTIVE      Patient seen and examined. No acute events overnight. Patient denies any acute complaints this morning.  He is at his baseline.    PHYSICAL EXAM      General: Alert, resting comfortably in no acute distress  Heent: mucus membranes moist  Neck: normal ROM  Cardiovascular: regular rate and rhythm  Pulmonary: normal effort on room air, no respiratory distress  Extremities: Moving all extremity spontaneously  Neuro / Psych: No new focal neurological deficits, A&O x3     PERTINENT TEST /EXAMS      I have reviewed all available laboratory results.    MEDICATIONS CURRENT   insulin lispro (HUMALOG,ADMELOG) injection vial 0-4 Units, Q6H  glucose chewable tablet 16 g, PRN  dextrose bolus 10% 125 mL, PRN   Or  dextrose bolus 10% 250 mL, PRN  glucagon injection 1 mg, PRN  dextrose 10 % infusion, Continuous PRN  calcium carbonate (TUMS) chewable tablet 500 mg, TID PRN  nitroGLYCERIN (NITROSTAT) SL tablet 0.4 mg, Q5 Min PRN  clopidogrel (PLAVIX) tablet 75 mg, Daily  tamsulosin (FLOMAX) capsule 0.4 mg, Daily  atorvastatin (LIPITOR) tablet 40 mg, Daily  fluticasone (FLONASE) 50 MCG/ACT nasal spray 2 spray, Daily PRN  magnesium oxide (MAG-OX) tablet 400 mg, Daily  albuterol sulfate HFA (PROVENTIL;VENTOLIN;PROAIR) 108 (90 Base) MCG/ACT inhaler 2 puff, Q6H PRN  pantoprazole (PROTONIX) tablet 20 mg, Daily  allopurinol (ZYLOPRIM) tablet 100 mg, Daily  sucralfate (CARAFATE) tablet 1 g, 4 times per day  rOPINIRole (REQUIP) tablet 0.5 mg, TID  latanoprost (XALATAN) 0.005 % ophthalmic solution 1 drop, Nightly  lidocaine 4 % external patch 1 patch, Daily  furosemide (LASIX) tablet 20 mg, Daily PRN  gabapentin (NEURONTIN) capsule 100 mg, Daily  sodium chloride flush 0.9 % injection 5-40 mL, 2 times per day  sodium chloride flush 0.9 % injection 5-40 mL, PRN  0.9 % sodium chloride infusion, PRN  potassium chloride (KLOR-CON M) extended release tablet 40  accuracy, there may be errors in the transcription that are not intended.

## 2025-06-04 LAB
GLUCOSE BLD-MCNC: 182 MG/DL (ref 75–110)
GLUCOSE BLD-MCNC: 221 MG/DL (ref 75–110)
GLUCOSE BLD-MCNC: 272 MG/DL (ref 75–110)
GLUCOSE BLD-MCNC: 312 MG/DL (ref 75–110)

## 2025-06-04 PROCEDURE — 6370000000 HC RX 637 (ALT 250 FOR IP): Performed by: EMERGENCY MEDICINE

## 2025-06-04 PROCEDURE — 97116 GAIT TRAINING THERAPY: CPT

## 2025-06-04 PROCEDURE — 97535 SELF CARE MNGMENT TRAINING: CPT

## 2025-06-04 PROCEDURE — 97110 THERAPEUTIC EXERCISES: CPT

## 2025-06-04 PROCEDURE — 6370000000 HC RX 637 (ALT 250 FOR IP): Performed by: NURSE PRACTITIONER

## 2025-06-04 PROCEDURE — 2500000003 HC RX 250 WO HCPCS: Performed by: EMERGENCY MEDICINE

## 2025-06-04 PROCEDURE — 1200000000 HC SEMI PRIVATE

## 2025-06-04 PROCEDURE — 82947 ASSAY GLUCOSE BLOOD QUANT: CPT

## 2025-06-04 PROCEDURE — 97530 THERAPEUTIC ACTIVITIES: CPT

## 2025-06-04 PROCEDURE — 6360000002 HC RX W HCPCS: Performed by: EMERGENCY MEDICINE

## 2025-06-04 PROCEDURE — 6370000000 HC RX 637 (ALT 250 FOR IP)

## 2025-06-04 RX ORDER — ALLOPURINOL 100 MG/1
100 TABLET ORAL DAILY
Qty: 30 TABLET | Refills: 3 | Status: SHIPPED | OUTPATIENT
Start: 2025-06-05

## 2025-06-04 RX ORDER — GABAPENTIN 100 MG/1
100 CAPSULE ORAL 3 TIMES DAILY
Qty: 180 CAPSULE | Refills: 0 | Status: SHIPPED | OUTPATIENT
Start: 2025-06-04 | End: 2025-08-03

## 2025-06-04 RX ORDER — CLOPIDOGREL BISULFATE 75 MG/1
75 TABLET ORAL DAILY
Qty: 30 TABLET | Refills: 3 | Status: SHIPPED | OUTPATIENT
Start: 2025-06-05

## 2025-06-04 RX ORDER — LATANOPROST 50 UG/ML
1 SOLUTION/ DROPS OPHTHALMIC NIGHTLY
Qty: 7.5 ML | Refills: 3 | Status: SHIPPED | OUTPATIENT
Start: 2025-06-04

## 2025-06-04 RX ADMIN — SODIUM CHLORIDE, PRESERVATIVE FREE 10 ML: 5 INJECTION INTRAVENOUS at 09:58

## 2025-06-04 RX ADMIN — CLOPIDOGREL BISULFATE 75 MG: 75 TABLET, FILM COATED ORAL at 09:52

## 2025-06-04 RX ADMIN — ENOXAPARIN SODIUM 40 MG: 100 INJECTION SUBCUTANEOUS at 09:57

## 2025-06-04 RX ADMIN — SODIUM CHLORIDE, PRESERVATIVE FREE 10 ML: 5 INJECTION INTRAVENOUS at 21:36

## 2025-06-04 RX ADMIN — LATANOPROST 1 DROP: 50 SOLUTION OPHTHALMIC at 21:36

## 2025-06-04 RX ADMIN — ROPINIROLE HYDROCHLORIDE 0.5 MG: 0.25 TABLET, FILM COATED ORAL at 09:53

## 2025-06-04 RX ADMIN — INSULIN LISPRO 3 UNITS: 100 INJECTION, SOLUTION INTRAVENOUS; SUBCUTANEOUS at 18:39

## 2025-06-04 RX ADMIN — SUCRALFATE 1 G: 1 TABLET ORAL at 09:52

## 2025-06-04 RX ADMIN — PANTOPRAZOLE SODIUM 20 MG: 20 TABLET, DELAYED RELEASE ORAL at 09:53

## 2025-06-04 RX ADMIN — ENTACAPONE 200 MG: 200 TABLET, FILM COATED ORAL at 15:43

## 2025-06-04 RX ADMIN — ACETAMINOPHEN 650 MG: 325 TABLET ORAL at 05:35

## 2025-06-04 RX ADMIN — ATORVASTATIN CALCIUM 40 MG: 40 TABLET, FILM COATED ORAL at 09:52

## 2025-06-04 RX ADMIN — ENTACAPONE 200 MG: 200 TABLET, FILM COATED ORAL at 09:53

## 2025-06-04 RX ADMIN — CARBIDOPA AND LEVODOPA 2 TABLET: 25; 100 TABLET ORAL at 21:36

## 2025-06-04 RX ADMIN — ANTACID TABLETS 500 MG: 500 TABLET, CHEWABLE ORAL at 15:41

## 2025-06-04 RX ADMIN — TAMSULOSIN HYDROCHLORIDE 0.4 MG: 0.4 CAPSULE ORAL at 09:52

## 2025-06-04 RX ADMIN — SUCRALFATE 1 G: 1 TABLET ORAL at 15:41

## 2025-06-04 RX ADMIN — INSULIN LISPRO 1 UNITS: 100 INJECTION, SOLUTION INTRAVENOUS; SUBCUTANEOUS at 13:06

## 2025-06-04 RX ADMIN — ROPINIROLE HYDROCHLORIDE 0.5 MG: 0.25 TABLET, FILM COATED ORAL at 21:35

## 2025-06-04 RX ADMIN — ALLOPURINOL 100 MG: 100 TABLET ORAL at 09:52

## 2025-06-04 RX ADMIN — CARBIDOPA AND LEVODOPA 2 TABLET: 25; 100 TABLET ORAL at 09:52

## 2025-06-04 RX ADMIN — INSULIN LISPRO 2 UNITS: 100 INJECTION, SOLUTION INTRAVENOUS; SUBCUTANEOUS at 21:34

## 2025-06-04 RX ADMIN — CARBIDOPA AND LEVODOPA 2 TABLET: 25; 100 TABLET ORAL at 15:41

## 2025-06-04 RX ADMIN — Medication 400 MG: at 09:52

## 2025-06-04 RX ADMIN — SUCRALFATE 1 G: 1 TABLET ORAL at 05:32

## 2025-06-04 RX ADMIN — SUCRALFATE 1 G: 1 TABLET ORAL at 23:15

## 2025-06-04 RX ADMIN — GABAPENTIN 100 MG: 100 CAPSULE ORAL at 09:52

## 2025-06-04 RX ADMIN — ENTACAPONE 200 MG: 200 TABLET, FILM COATED ORAL at 21:35

## 2025-06-04 RX ADMIN — POLYETHYLENE GLYCOL 3350 17 G: 17 POWDER, FOR SOLUTION ORAL at 12:42

## 2025-06-04 RX ADMIN — ONDANSETRON 4 MG: 2 INJECTION INTRAMUSCULAR; INTRAVENOUS at 15:41

## 2025-06-04 RX ADMIN — ROPINIROLE HYDROCHLORIDE 0.5 MG: 0.25 TABLET, FILM COATED ORAL at 15:41

## 2025-06-04 ASSESSMENT — PAIN SCALES - GENERAL
PAINLEVEL_OUTOF10: 3
PAINLEVEL_OUTOF10: 0

## 2025-06-04 ASSESSMENT — PAIN - FUNCTIONAL ASSESSMENT: PAIN_FUNCTIONAL_ASSESSMENT: ACTIVITIES ARE NOT PREVENTED

## 2025-06-04 ASSESSMENT — PAIN SCALES - WONG BAKER: WONGBAKER_NUMERICALRESPONSE: NO HURT

## 2025-06-04 ASSESSMENT — PAIN DESCRIPTION - DESCRIPTORS: DESCRIPTORS: DISCOMFORT

## 2025-06-04 ASSESSMENT — PAIN DESCRIPTION - PAIN TYPE: TYPE: ACUTE PAIN

## 2025-06-04 ASSESSMENT — PAIN DESCRIPTION - FREQUENCY: FREQUENCY: CONTINUOUS

## 2025-06-04 ASSESSMENT — PAIN DESCRIPTION - ORIENTATION: ORIENTATION: MID

## 2025-06-04 ASSESSMENT — PAIN DESCRIPTION - ONSET: ONSET: ON-GOING

## 2025-06-04 ASSESSMENT — PAIN DESCRIPTION - LOCATION: LOCATION: HEAD

## 2025-06-04 NOTE — CARE COORDINATION
06/04/25 1135   Readmission Assessment   Number of Days since last admission? 8-30 days   Previous Disposition SNF   Who is being Interviewed Patient   What was the patient's/caregiver's perception as to why they think they needed to return back to the hospital? Other (Comment)  (weakness)   Did you visit your Primary Care Physician after you left the hospital, before you returned this time? Yes  (saw [facility] physician prior to leaving [place])   Did you see a specialist, such as Cardiac, Pulmonary, Orthopedic Physician, etc. after you left the hospital? No   Who advised the patient to return to the hospital? Self-referral   Does the patient report anything that got in the way of taking their medications? No   In our efforts to provide the best possible care to you and others like you, can you think of anything that we could have done to help you after you left the hospital the first time, so that you might not have needed to return so soon? Other (Comment)  (nothing)

## 2025-06-04 NOTE — PROGRESS NOTES
Occupational Therapy  Facility/Department: [address] MED SURG  Occupational Therapy Initial Assessment    Name: Mina Gill  : 1940  MRN: [id number]  Date of Service: 2025    Discharge Recommendations:  Patient would benefit from continued therapy after discharge     Patient Diagnosis(es): The encounter diagnosis was Generalized weakness.  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  Past Surgical History:  has a past surgical history that includes Colonoscopy (2012); shoulder surgery (Right); pr colsc flx w/rmvl of tumor polyp lesion snare tq (N/A, 2017); Colonoscopy (2017); Cholecystectomy, laparoscopic (N/A, 10/29/2022); Esophagogastroduodenoscopy (2023); Upper gastrointestinal endoscopy (N/A, 2023); Upper gastrointestinal endoscopy (N/A, 2023); Cardiac procedure (N/A, 2024); Cardiac procedure (N/A, 2024); and ep device procedure (N/A, 2024).    Assessment  Performance deficits / Impairments: Decreased functional mobility ;Decreased ADL status;Decreased safe awareness;Decreased endurance;Decreased balance;Decreased strength  Assessment: Pt would benefit from continued acute care and post acute care OT to address above listed deficits.  Prognosis: Good  Activity Tolerance  Activity Tolerance: Patient Tolerated treatment well;Patient limited by fatigue;Patient limited by pain     Plan  Occupational Therapy Plan  Times Per  belt;Nurse notified;Patient at risk for falls;Left in chair;Chair alarm in place  Balance  Sitting: With support;Without support (seated on toilet and in recliner w/SBA ~50 min)  Standing: With support (stood for transfers and func mob ~3 min w/CGA using RW)  Toilet Transfers  Toilet - Technique: Ambulating  Equipment Used: Standard toilet  Toilet Transfer: Minimal assistance     ADL  Feeding: Stand by assistance;Increased time to complete;Setup (assist to open containers, cut food d/t tremors)  Grooming: Stand by assistance;Setup;Verbal cueing;Increased time to complete (wash head/face, brush teeth, comb hair)  UE Bathing: Stand by assistance;Setup;Verbal cueing;Increased time to complete (assist to wash back)  LE Bathing: Moderate assistance;Setup;Verbal cueing;Increased time to complete (assist to wash lower legs and feet)  UE Dressing: Verbal cueing;Minimal assistance;Setup;Increased time to complete (assist to doff/don gown)  LE Dressing: Maximum assistance;Setup;Verbal cueing;Increased time to complete (assist to doff/don footies)  Toileting: Maximum assistance;Setup;Increased time to complete;Adaptive equipment (assist to wipe bottom after BM)  Functional Mobility: Contact guard assistance;Setup;Increased time to complete;Adaptive equipment (w/RW w/no gross LOB)  Additional Comments: Pt on toilet in bathroom upon arrival. STS to RW w/min assist and needed assist to wipe after BM. Func mob from bathroom to recliner w/RW CGA. Pt sat in chair and was setup on tray table for self care (see above for LOF). Pt needed increased time and assist d/t fatigue, pain and overall weaknss.  Product Used : Soap and water     Bed mobility  Bed Mobility Comments: BUDDY-pt on toilet upon arrival and left in chair at end of tx  Transfers  Sit to stand: Minimal assistance  Stand to sit: Minimal assistance  Transfer Comments: w/RW and vc's for hand placement  Cognition  Overall Cognitive Status: Exceptions  Arousal/Alertness:

## 2025-06-04 NOTE — CARE COORDINATION
Called [employer] @ [phone], sp/w Huy to inquire how to change facility to [facility]. She was able to take pt info and escalate Ticket# [id number], states changes can take 24-48hrs and a notification will be faxed to office fax. There is no way to cancel an auth in the [employer] portal.

## 2025-06-04 NOTE — PLAN OF CARE
Problem: Discharge Planning  Goal: Discharge to home or other facility with appropriate resources  Outcome: Progressing     Problem: Pain  Goal: Verbalizes/displays adequate comfort level or baseline comfort level  Outcome: Progressing     Problem: Safety - Adult  Goal: Free from fall injury  Outcome: Progressing     Problem: Skin/Tissue Integrity  Goal: Skin integrity remains intact  Description: 1.  Monitor for areas of redness and/or skin breakdown2.  Assess vascular access sites hourly3.  Every 4-6 hours minimum:  Change oxygen saturation probe site4.  Every 4-6 hours:  If on nasal continuous positive airway pressure, respiratory therapy assess nares and determine need for appliance change or resting period  Outcome: Progressing     Problem: Chronic Conditions and Co-morbidities  Goal: Patient's chronic conditions and co-morbidity symptoms are monitored and maintained or improved  Outcome: Progressing

## 2025-06-04 NOTE — PLAN OF CARE
Problem: Discharge Planning  Goal: Discharge to home or other facility with appropriate resources  6/3/2025 2223 by Briana Landon RN  Outcome: Progressing  6/3/2025 1515 by Abiola Ervin RN  Outcome: Progressing     Problem: Pain  Goal: Verbalizes/displays adequate comfort level or baseline comfort level  6/3/2025 2223 by Briana Landon RN  Outcome: Progressing  6/3/2025 1515 by Abiola Ervin RN  Outcome: Progressing     Problem: Safety - Adult  Goal: Free from fall injury  6/3/2025 2223 by Briana Landon RN  Outcome: Progressing  6/3/2025 1515 by Abiola Ervin RN  Outcome: Progressing     Problem: Skin/Tissue Integrity  Goal: Skin integrity remains intact  Description: 1.  Monitor for areas of redness and/or skin breakdown2.  Assess vascular access sites hourly3.  Every 4-6 hours minimum:  Change oxygen saturation probe site4.  Every 4-6 hours:  If on nasal continuous positive airway pressure, respiratory therapy assess nares and determine need for appliance change or resting period  6/3/2025 2223 by Briana Landon RN  Outcome: Progressing  6/3/2025 1515 by Abiola Ervin RN  Outcome: Progressing     Problem: Chronic Conditions and Co-morbidities  Goal: Patient's chronic conditions and co-morbidity symptoms are monitored and maintained or improved  6/3/2025 2223 by Briana Landon RN  Outcome: Progressing  6/3/2025 1515 by Abiola Ervin RN  Outcome: Progressing

## 2025-06-04 NOTE — DISCHARGE INSTR - DIET

## 2025-06-04 NOTE — PROGRESS NOTES
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  Past Surgical History:  has a past surgical history that includes Colonoscopy (2012); shoulder surgery (Right); pr colsc flx w/rmvl of tumor polyp lesion snare tq (N/A, 2017); Colonoscopy (2017); Cholecystectomy, laparoscopic (N/A, 10/29/2022); Esophagogastroduodenoscopy (2023); Upper gastrointestinal endoscopy (N/A, 2023); Upper gastrointestinal endoscopy (N/A, 2023); Cardiac procedure (N/A, 2024); Cardiac procedure (N/A, 2024); and ep device procedure (N/A, 2024).    Discharge Recommendations  Discharge Recommendations: Patient would benefit from continued therapy after discharge  PT Equipment Recommendations  Equipment Needed: Yes  Mobility Devices: Walker  Walker: Rolling  Other: pt has SPC in room, based on mobility ths date pt should use RW    Assessment  Body Structures, Functions, Activity Limitations Requiring Skilled Therapeutic Intervention: Decreased functional mobility ;Decreased ADL status;Decreased body mechanics;Decreased safe awareness;Decreased strength;Decreased coordination;Decreased high-level  needed    Plan  Physical Therapy Plan  General Plan:  (5-6x/wk)  Current Treatment Recommendations: Strengthening, Balance training, Functional mobility training, Transfer training, ADL/Self-care training, Endurance training, Equipment evaluation, education, & procurement, Neuromuscular re-education, Patient/Caregiver education & training, Stair training, Gait training, Safety education & training, Home exercise program, Therapeutic activities    Goals  Short Term Goals  Time Frame for Short Term Goals: 14 visits  Short Term Goal 1: Complete transfers with least restrictive AD and mod I  Short Term Goal 2: Complete 300 ft of gait with least restrictive AD and mod I  Short Term Goal 3: Complete 6 stairs with one handrail and mod I  Short Term Goal 4: Participate in 30 minutes of therapy to promote endurance    Minutes  PT Individual Minutes  Time In: 0838  Time Out: 0920  Minutes: 42  Time Code Minutes  Timed Code Treatment Minutes: 40 Minutes    Electronically signed by Candie Alvarez PTA on 6/4/25 at 9:46 AM EDT

## 2025-06-04 NOTE — PROGRESS NOTES
OBS/CDU   Progress NOTE      Patients PCP is:  Tisha Glasgow MD        SUBJECTIVE      Patient with no new medical complaint but continued tremor and difficulty caring for self secondary to Parkinson's and shaking.  Patient has ECF plans.  Patient ready for next phase of care medically.  Continue to monitor    PHYSICAL EXAM      General: NAD, AO X 3  Heent: EOMI, PERRL  Neck: SUPPLE, NO JVD  Cardiovascular: RRR, S1S2  Pulmonary: CTAB, NO SOB  Abdomen: SOFT, NTTP, ND, +BS  Extremities: +2/4 PULSES DISTAL, NO SWELLING  Neuro / Psych: NO NUMBNESS OR TINGLING, MENTATION AT BASELINE    PERTINENT TEST /EXAMS      I have reviewed all available laboratory results.    MEDICATIONS CURRENT   insulin lispro (HUMALOG,ADMELOG) injection vial 0-4 Units, 4x Daily AC & HS  melatonin tablet 5 mg, Nightly PRN  glucose chewable tablet 16 g, PRN  dextrose bolus 10% 125 mL, PRN   Or  dextrose bolus 10% 250 mL, PRN  glucagon injection 1 mg, PRN  dextrose 10 % infusion, Continuous PRN  calcium carbonate (TUMS) chewable tablet 500 mg, TID PRN  nitroGLYCERIN (NITROSTAT) SL tablet 0.4 mg, Q5 Min PRN  clopidogrel (PLAVIX) tablet 75 mg, Daily  tamsulosin (FLOMAX) capsule 0.4 mg, Daily  atorvastatin (LIPITOR) tablet 40 mg, Daily  fluticasone (FLONASE) 50 MCG/ACT nasal spray 2 spray, Daily PRN  magnesium oxide (MAG-OX) tablet 400 mg, Daily  albuterol sulfate HFA (PROVENTIL;VENTOLIN;PROAIR) 108 (90 Base) MCG/ACT inhaler 2 puff, Q6H PRN  pantoprazole (PROTONIX) tablet 20 mg, Daily  allopurinol (ZYLOPRIM) tablet 100 mg, Daily  sucralfate (CARAFATE) tablet 1 g, 4 times per day  rOPINIRole (REQUIP) tablet 0.5 mg, TID  latanoprost (XALATAN) 0.005 % ophthalmic solution 1 drop, Nightly  lidocaine 4 % external patch 1 patch, Daily  furosemide (LASIX) tablet 20 mg, Daily PRN  gabapentin (NEURONTIN) capsule 100 mg, Daily  sodium chloride flush 0.9 % injection 5-40 mL, 2 times per day  sodium chloride flush 0.9 % injection 5-40 mL, PRN  0.9 % sodium

## 2025-06-05 LAB
GLUCOSE BLD-MCNC: 201 MG/DL (ref 75–110)
GLUCOSE BLD-MCNC: 221 MG/DL (ref 75–110)
GLUCOSE BLD-MCNC: 292 MG/DL (ref 75–110)
GLUCOSE BLD-MCNC: 316 MG/DL (ref 75–110)

## 2025-06-05 PROCEDURE — 6370000000 HC RX 637 (ALT 250 FOR IP)

## 2025-06-05 PROCEDURE — 6370000000 HC RX 637 (ALT 250 FOR IP): Performed by: EMERGENCY MEDICINE

## 2025-06-05 PROCEDURE — 97116 GAIT TRAINING THERAPY: CPT

## 2025-06-05 PROCEDURE — 97110 THERAPEUTIC EXERCISES: CPT

## 2025-06-05 PROCEDURE — 6360000002 HC RX W HCPCS: Performed by: EMERGENCY MEDICINE

## 2025-06-05 PROCEDURE — 82947 ASSAY GLUCOSE BLOOD QUANT: CPT

## 2025-06-05 PROCEDURE — 6370000000 HC RX 637 (ALT 250 FOR IP): Performed by: NURSE PRACTITIONER

## 2025-06-05 PROCEDURE — 2500000003 HC RX 250 WO HCPCS: Performed by: EMERGENCY MEDICINE

## 2025-06-05 PROCEDURE — 1200000000 HC SEMI PRIVATE

## 2025-06-05 RX ADMIN — SUCRALFATE 1 G: 1 TABLET ORAL at 21:38

## 2025-06-05 RX ADMIN — Medication 400 MG: at 08:47

## 2025-06-05 RX ADMIN — INSULIN LISPRO 1 UNITS: 100 INJECTION, SOLUTION INTRAVENOUS; SUBCUTANEOUS at 08:48

## 2025-06-05 RX ADMIN — ENOXAPARIN SODIUM 40 MG: 100 INJECTION SUBCUTANEOUS at 08:48

## 2025-06-05 RX ADMIN — ROPINIROLE HYDROCHLORIDE 0.5 MG: 0.25 TABLET, FILM COATED ORAL at 20:31

## 2025-06-05 RX ADMIN — INSULIN LISPRO 1 UNITS: 100 INJECTION, SOLUTION INTRAVENOUS; SUBCUTANEOUS at 20:30

## 2025-06-05 RX ADMIN — CARBIDOPA AND LEVODOPA 2 TABLET: 25; 100 TABLET ORAL at 20:31

## 2025-06-05 RX ADMIN — PANTOPRAZOLE SODIUM 20 MG: 20 TABLET, DELAYED RELEASE ORAL at 08:48

## 2025-06-05 RX ADMIN — SUCRALFATE 1 G: 1 TABLET ORAL at 12:59

## 2025-06-05 RX ADMIN — ATORVASTATIN CALCIUM 40 MG: 40 TABLET, FILM COATED ORAL at 08:47

## 2025-06-05 RX ADMIN — SODIUM CHLORIDE, PRESERVATIVE FREE 10 ML: 5 INJECTION INTRAVENOUS at 08:49

## 2025-06-05 RX ADMIN — ACETAMINOPHEN 650 MG: 325 TABLET ORAL at 18:01

## 2025-06-05 RX ADMIN — GABAPENTIN 100 MG: 100 CAPSULE ORAL at 08:48

## 2025-06-05 RX ADMIN — ENTACAPONE 200 MG: 200 TABLET, FILM COATED ORAL at 14:46

## 2025-06-05 RX ADMIN — INSULIN LISPRO 3 UNITS: 100 INJECTION, SOLUTION INTRAVENOUS; SUBCUTANEOUS at 12:59

## 2025-06-05 RX ADMIN — ANTACID TABLETS 500 MG: 500 TABLET, CHEWABLE ORAL at 08:53

## 2025-06-05 RX ADMIN — ROPINIROLE HYDROCHLORIDE 0.5 MG: 0.25 TABLET, FILM COATED ORAL at 08:47

## 2025-06-05 RX ADMIN — SUCRALFATE 1 G: 1 TABLET ORAL at 05:26

## 2025-06-05 RX ADMIN — CARBIDOPA AND LEVODOPA 2 TABLET: 25; 100 TABLET ORAL at 08:47

## 2025-06-05 RX ADMIN — SODIUM CHLORIDE, PRESERVATIVE FREE 10 ML: 5 INJECTION INTRAVENOUS at 20:29

## 2025-06-05 RX ADMIN — SUCRALFATE 1 G: 1 TABLET ORAL at 18:01

## 2025-06-05 RX ADMIN — INSULIN LISPRO 2 UNITS: 100 INJECTION, SOLUTION INTRAVENOUS; SUBCUTANEOUS at 18:01

## 2025-06-05 RX ADMIN — ROPINIROLE HYDROCHLORIDE 0.5 MG: 0.25 TABLET, FILM COATED ORAL at 14:46

## 2025-06-05 RX ADMIN — ENTACAPONE 200 MG: 200 TABLET, FILM COATED ORAL at 08:47

## 2025-06-05 RX ADMIN — ENTACAPONE 200 MG: 200 TABLET, FILM COATED ORAL at 20:31

## 2025-06-05 RX ADMIN — ONDANSETRON 4 MG: 2 INJECTION INTRAMUSCULAR; INTRAVENOUS at 08:48

## 2025-06-05 RX ADMIN — CLOPIDOGREL BISULFATE 75 MG: 75 TABLET, FILM COATED ORAL at 08:47

## 2025-06-05 RX ADMIN — ALLOPURINOL 100 MG: 100 TABLET ORAL at 08:47

## 2025-06-05 RX ADMIN — CARBIDOPA AND LEVODOPA 2 TABLET: 25; 100 TABLET ORAL at 14:46

## 2025-06-05 RX ADMIN — TAMSULOSIN HYDROCHLORIDE 0.4 MG: 0.4 CAPSULE ORAL at 08:47

## 2025-06-05 RX ADMIN — LATANOPROST 1 DROP: 50 SOLUTION OPHTHALMIC at 20:32

## 2025-06-05 RX ADMIN — Medication 5 MG: at 21:38

## 2025-06-05 RX ADMIN — ONDANSETRON 4 MG: 2 INJECTION INTRAMUSCULAR; INTRAVENOUS at 21:38

## 2025-06-05 ASSESSMENT — PAIN SCALES - GENERAL: PAINLEVEL_OUTOF10: 3

## 2025-06-05 NOTE — PROGRESS NOTES
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  Past Surgical History:  has a past surgical history that includes Colonoscopy (2012); shoulder surgery (Right); pr colsc flx w/rmvl of tumor polyp lesion snare tq (N/A, 2017); Colonoscopy (2017); Cholecystectomy, laparoscopic (N/A, 10/29/2022); Esophagogastroduodenoscopy (2023); Upper gastrointestinal endoscopy (N/A, 2023); Upper gastrointestinal endoscopy (N/A, 2023); Cardiac procedure (N/A, 2024); Cardiac procedure (N/A, 2024); and ep device procedure (N/A, 2024).    Discharge Recommendations  Discharge Recommendations: Patient would benefit from continued therapy after discharge  PT Equipment Recommendations  Equipment Needed: Yes  Mobility Devices: Walker  Walker: Rolling  Other: pt has SPC in room, based on mobility ths date pt should use RW    Assessment  Body Structures, Functions, Activity Limitations Requiring Skilled Therapeutic Intervention: Decreased functional mobility ;Decreased ADL status;Decreased body mechanics;Decreased safe awareness;Decreased strength;Decreased coordination;Decreased high-level  re-education, Patient/Caregiver education & training, Stair training, Gait training, Safety education & training, Home exercise program, Therapeutic activities    Goals  Short Term Goals  Time Frame for Short Term Goals: 14 visits  Short Term Goal 1: Complete transfers with least restrictive AD and mod I  Short Term Goal 2: Complete 300 ft of gait with least restrictive AD and mod I  Short Term Goal 3: Complete 6 stairs with one handrail and mod I  Short Term Goal 4: Participate in 30 minutes of therapy to promote endurance    Minutes  PT Individual Minutes  Time In: 0820  Time Out: 0844  Minutes: 24  Time Code Minutes  Timed Code Treatment Minutes: 24 Minutes    Electronically signed by Candie Alvarez PTA on 6/5/25 at 9:29 AM EDT

## 2025-06-05 NOTE — CARE COORDINATION
Case Management   Daily Progress Note       Patient Name: Mina Gill                   YOB: 1940  Diagnosis: Generalized weakness [R53.1]  Parkinson disease with dyskinesia (HCC) [G20.B1]  Hyperglycemia due to diabetes mellitus (HCC) [E11.65]                       GMLOS: 3.8 days  Length of Stay: 3  days    Anticipated Discharge Date: Longer than expected LOS    Readmission Risk (Low < 19, Mod (19-27), High > 27): Readmission Risk Score: 26.5        Current Transitional Plan    [] Home Independently    [] Home with HC    [x] Skilled Nursing Facility    [] Acute Rehabilitation    [] Long Term Acute Care (LTAC)    [] Other:     Plan for the Stay (Medical Management) :          Workflow Continuation (Additional Notes) : Plan remains [facility]. Await Precert.        Bel Burns RN  June 5, 2025

## 2025-06-05 NOTE — PROGRESS NOTES
OBS/CDU   Progress NOTE      Patients PCP is:  Tisha Glasgow MD        SUBJECTIVE      No acute overnight events.     PHYSICAL EXAM      General: NAD, AO X 3  Heent: EOMI, PERRL  Neck: SUPPLE, NO JVD  Cardiovascular: RRR, S1S2  Pulmonary: CTAB, NO SOB  Abdomen: SOFT, NTTP, ND, +BS  Extremities: +2/4 PULSES DISTAL, NO SWELLING  Neuro / Psych: NO NUMBNESS OR TINGLING, MENTATION AT BASELINE    PERTINENT TEST /EXAMS      I have reviewed all available laboratory results.    MEDICATIONS CURRENT   insulin lispro (HUMALOG,ADMELOG) injection vial 0-4 Units, 4x Daily AC & HS  melatonin tablet 5 mg, Nightly PRN  glucose chewable tablet 16 g, PRN  dextrose bolus 10% 125 mL, PRN   Or  dextrose bolus 10% 250 mL, PRN  glucagon injection 1 mg, PRN  dextrose 10 % infusion, Continuous PRN  calcium carbonate (TUMS) chewable tablet 500 mg, TID PRN  nitroGLYCERIN (NITROSTAT) SL tablet 0.4 mg, Q5 Min PRN  clopidogrel (PLAVIX) tablet 75 mg, Daily  tamsulosin (FLOMAX) capsule 0.4 mg, Daily  atorvastatin (LIPITOR) tablet 40 mg, Daily  fluticasone (FLONASE) 50 MCG/ACT nasal spray 2 spray, Daily PRN  magnesium oxide (MAG-OX) tablet 400 mg, Daily  albuterol sulfate HFA (PROVENTIL;VENTOLIN;PROAIR) 108 (90 Base) MCG/ACT inhaler 2 puff, Q6H PRN  pantoprazole (PROTONIX) tablet 20 mg, Daily  allopurinol (ZYLOPRIM) tablet 100 mg, Daily  sucralfate (CARAFATE) tablet 1 g, 4 times per day  rOPINIRole (REQUIP) tablet 0.5 mg, TID  latanoprost (XALATAN) 0.005 % ophthalmic solution 1 drop, Nightly  lidocaine 4 % external patch 1 patch, Daily  furosemide (LASIX) tablet 20 mg, Daily PRN  gabapentin (NEURONTIN) capsule 100 mg, Daily  sodium chloride flush 0.9 % injection 5-40 mL, 2 times per day  sodium chloride flush 0.9 % injection 5-40 mL, PRN  0.9 % sodium chloride infusion, PRN  potassium chloride (KLOR-CON M) extended release tablet 40 mEq, PRN   Or  potassium bicarb-citric acid (EFFER-K) effervescent tablet 40 mEq, PRN   Or  potassium chloride 10  mEq/100 mL IVPB (Peripheral Line), PRN  enoxaparin (LOVENOX) injection 40 mg, Daily  ondansetron (ZOFRAN) injection 4 mg, Q4H PRN  polyethylene glycol (GLYCOLAX) packet 17 g, Daily PRN  acetaminophen (TYLENOL) tablet 650 mg, Q6H PRN   Or  acetaminophen (TYLENOL) suppository 650 mg, Q6H PRN  entacapone (COMTAN) tablet 200 mg, TID   And  carbidopa-levodopa (SINEMET)  MG per tablet 2 tablet, TID        All medication charted and reviewed.    CONSULTS      IP CONSULT TO CASE MANAGEMENT    ASSESSMENT/PLAN       Mina Gill is a 84 y.o. male who presents with need for ECF placement.  Patient unable to care for self at home.     Parkinson's.  Stable.  Patient with ongoing tremors.  Patient in reasonably good spirits today.  Looking forward to next phase of care  Elevation glucose  Patient with elevated hemoglobin A1c consistent with diabetes for some time  Continue to monitor sugars and control on sliding scale currently  Will need more definitive arrangement once situated ECF  PT OT  Ongoing care here.  Episodic.  Patient will do better in ECF  Trying to keep patient awake during the day and out of bed is much as possible  Placement  Patient accepted at facility.  Awaiting bed availability  Awaiting insurance company approval.  Continue home medications and pain control  Monitor vitals, labs, and imaging  DISPO: pending consults and clinical improvement    --  Jermaine Cristina MD  Observation Physician     This dictation was generated by voice recognition computer software.  Although all attempts are made to edit the dictation for accuracy, there may be errors in the transcription that are not intended.

## 2025-06-05 NOTE — PROGRESS NOTES
[facility]  CDU / OBSERVATION ENCOUNTER  ATTENDING NOTE       I performed a history and physical examination of the patient and discussed management with the resident or midlevel provider. I reviewed the resident or midlevel provider's note and agree with the documented findings and plan of care. Any areas of disagreement are noted on the chart. I was personally present for the key portions of any procedures. I have documented in the chart those procedures where I was not present during the key portions. I have reviewed the nurses notes. I agree with the chief complaint, past medical history, past surgical history, allergies, medications, social and family history as documented unless otherwise noted below.    The Family history, social history, and ROS are effectively unchanged since admission unless noted elsewhere in the chart.    This patient was placed in the observation unit for reevaluation for possible admission to the hospital    The patient is a 84-year-old male who presents for evaluation of worsening Parkinson's disease.  He is currently awaiting placement.    eTte Fernandes DO, FACEP  Attending Emergency  Physician

## 2025-06-05 NOTE — PLAN OF CARE
Problem: Discharge Planning  Goal: Discharge to home or other facility with appropriate resources  6/5/2025 0416 by Kaya Mahoney RN  Outcome: Progressing  6/4/2025 1847 by Lora Fuentes RN  Outcome: Progressing     Problem: Pain  Goal: Verbalizes/displays adequate comfort level or baseline comfort level  6/5/2025 0416 by Kaya Mahoney RN  Outcome: Progressing  6/4/2025 1847 by Lora Fuentes RN  Outcome: Progressing     Problem: Safety - Adult  Goal: Free from fall injury  6/5/2025 0416 by Kaya Mahoney RN  Outcome: Progressing  6/4/2025 1847 by Lora Fuentes RN  Outcome: Progressing     Problem: Skin/Tissue Integrity  Goal: Skin integrity remains intact  Description: 1.  Monitor for areas of redness and/or skin breakdown2.  Assess vascular access sites hourly3.  Every 4-6 hours minimum:  Change oxygen saturation probe site4.  Every 4-6 hours:  If on nasal continuous positive airway pressure, respiratory therapy assess nares and determine need for appliance change or resting period  6/5/2025 0416 by Kaya Mahoney RN  Outcome: Progressing  6/4/2025 1847 by Lora Fuentes RN  Outcome: Progressing     Problem: Chronic Conditions and Co-morbidities  Goal: Patient's chronic conditions and co-morbidity symptoms are monitored and maintained or improved  6/5/2025 0416 by Kaya Mahoney RN  Outcome: Progressing  6/4/2025 1847 by Lora Fuentes RN  Outcome: Progressing

## 2025-06-06 LAB
GLUCOSE BLD-MCNC: 158 MG/DL (ref 75–110)
GLUCOSE BLD-MCNC: 224 MG/DL (ref 75–110)
GLUCOSE BLD-MCNC: 261 MG/DL (ref 75–110)
GLUCOSE BLD-MCNC: 268 MG/DL (ref 75–110)

## 2025-06-06 PROCEDURE — 97535 SELF CARE MNGMENT TRAINING: CPT

## 2025-06-06 PROCEDURE — 6370000000 HC RX 637 (ALT 250 FOR IP)

## 2025-06-06 PROCEDURE — 1200000000 HC SEMI PRIVATE

## 2025-06-06 PROCEDURE — 97116 GAIT TRAINING THERAPY: CPT

## 2025-06-06 PROCEDURE — 2500000003 HC RX 250 WO HCPCS: Performed by: EMERGENCY MEDICINE

## 2025-06-06 PROCEDURE — 82947 ASSAY GLUCOSE BLOOD QUANT: CPT

## 2025-06-06 PROCEDURE — 97530 THERAPEUTIC ACTIVITIES: CPT

## 2025-06-06 PROCEDURE — 6370000000 HC RX 637 (ALT 250 FOR IP): Performed by: EMERGENCY MEDICINE

## 2025-06-06 PROCEDURE — 6370000000 HC RX 637 (ALT 250 FOR IP): Performed by: NURSE PRACTITIONER

## 2025-06-06 PROCEDURE — 97110 THERAPEUTIC EXERCISES: CPT

## 2025-06-06 PROCEDURE — 6360000002 HC RX W HCPCS: Performed by: EMERGENCY MEDICINE

## 2025-06-06 RX ORDER — INSULIN LISPRO 100 [IU]/ML
0-8 INJECTION, SOLUTION INTRAVENOUS; SUBCUTANEOUS EVERY 6 HOURS
Status: DISCONTINUED | OUTPATIENT
Start: 2025-06-06 | End: 2025-06-07

## 2025-06-06 RX ADMIN — Medication 400 MG: at 09:58

## 2025-06-06 RX ADMIN — ENTACAPONE 200 MG: 200 TABLET, FILM COATED ORAL at 09:57

## 2025-06-06 RX ADMIN — SODIUM CHLORIDE, PRESERVATIVE FREE 10 ML: 5 INJECTION INTRAVENOUS at 07:33

## 2025-06-06 RX ADMIN — LATANOPROST 1 DROP: 50 SOLUTION OPHTHALMIC at 20:16

## 2025-06-06 RX ADMIN — INSULIN LISPRO 4 UNITS: 100 INJECTION, SOLUTION INTRAVENOUS; SUBCUTANEOUS at 20:16

## 2025-06-06 RX ADMIN — CARBIDOPA AND LEVODOPA 2 TABLET: 25; 100 TABLET ORAL at 13:59

## 2025-06-06 RX ADMIN — ONDANSETRON 4 MG: 2 INJECTION INTRAMUSCULAR; INTRAVENOUS at 07:32

## 2025-06-06 RX ADMIN — ALLOPURINOL 100 MG: 100 TABLET ORAL at 09:57

## 2025-06-06 RX ADMIN — SODIUM CHLORIDE, PRESERVATIVE FREE 10 ML: 5 INJECTION INTRAVENOUS at 20:14

## 2025-06-06 RX ADMIN — ROPINIROLE HYDROCHLORIDE 0.5 MG: 0.25 TABLET, FILM COATED ORAL at 20:15

## 2025-06-06 RX ADMIN — PANTOPRAZOLE SODIUM 20 MG: 20 TABLET, DELAYED RELEASE ORAL at 08:59

## 2025-06-06 RX ADMIN — ENOXAPARIN SODIUM 40 MG: 100 INJECTION SUBCUTANEOUS at 08:59

## 2025-06-06 RX ADMIN — GABAPENTIN 100 MG: 100 CAPSULE ORAL at 09:58

## 2025-06-06 RX ADMIN — SUCRALFATE 1 G: 1 TABLET ORAL at 21:53

## 2025-06-06 RX ADMIN — CARBIDOPA AND LEVODOPA 2 TABLET: 25; 100 TABLET ORAL at 20:15

## 2025-06-06 RX ADMIN — ATORVASTATIN CALCIUM 40 MG: 40 TABLET, FILM COATED ORAL at 09:58

## 2025-06-06 RX ADMIN — TAMSULOSIN HYDROCHLORIDE 0.4 MG: 0.4 CAPSULE ORAL at 09:58

## 2025-06-06 RX ADMIN — ROPINIROLE HYDROCHLORIDE 0.5 MG: 0.25 TABLET, FILM COATED ORAL at 13:58

## 2025-06-06 RX ADMIN — CARBIDOPA AND LEVODOPA 2 TABLET: 25; 100 TABLET ORAL at 09:57

## 2025-06-06 RX ADMIN — ENTACAPONE 200 MG: 200 TABLET, FILM COATED ORAL at 20:15

## 2025-06-06 RX ADMIN — ROPINIROLE HYDROCHLORIDE 0.5 MG: 0.25 TABLET, FILM COATED ORAL at 09:57

## 2025-06-06 RX ADMIN — SUCRALFATE 1 G: 1 TABLET ORAL at 05:46

## 2025-06-06 RX ADMIN — CLOPIDOGREL BISULFATE 75 MG: 75 TABLET, FILM COATED ORAL at 09:58

## 2025-06-06 RX ADMIN — ENTACAPONE 200 MG: 200 TABLET, FILM COATED ORAL at 14:01

## 2025-06-06 RX ADMIN — ACETAMINOPHEN 650 MG: 325 TABLET ORAL at 05:46

## 2025-06-06 RX ADMIN — INSULIN LISPRO 4 UNITS: 100 INJECTION, SOLUTION INTRAVENOUS; SUBCUTANEOUS at 17:33

## 2025-06-06 RX ADMIN — INSULIN LISPRO 1 UNITS: 100 INJECTION, SOLUTION INTRAVENOUS; SUBCUTANEOUS at 12:20

## 2025-06-06 RX ADMIN — SUCRALFATE 1 G: 1 TABLET ORAL at 12:20

## 2025-06-06 RX ADMIN — SUCRALFATE 1 G: 1 TABLET ORAL at 17:33

## 2025-06-06 ASSESSMENT — PAIN DESCRIPTION - DESCRIPTORS: DESCRIPTORS: ACHING

## 2025-06-06 ASSESSMENT — PAIN SCALES - GENERAL
PAINLEVEL_OUTOF10: 3
PAINLEVEL_OUTOF10: 5

## 2025-06-06 ASSESSMENT — PAIN - FUNCTIONAL ASSESSMENT: PAIN_FUNCTIONAL_ASSESSMENT: PREVENTS OR INTERFERES SOME ACTIVE ACTIVITIES AND ADLS

## 2025-06-06 ASSESSMENT — PAIN DESCRIPTION - LOCATION: LOCATION: HEAD

## 2025-06-06 NOTE — PLAN OF CARE
Problem: Discharge Planning  Goal: Discharge to home or other facility with appropriate resources  Outcome: Progressing     Problem: Pain  Goal: Verbalizes/displays adequate comfort level or baseline comfort level  Outcome: Progressing     Problem: Safety - Adult  Goal: Free from fall injury  Outcome: Progressing     Problem: Skin/Tissue Integrity  Goal: Skin integrity remains intact  Description: 1.  Monitor for areas of redness and/or skin breakdown2.  Assess vascular access sites hourly3.  Every 4-6 hours minimum:  Change oxygen saturation probe site4.  Every 4-6 hours:  If on nasal continuous positive airway pressure, respiratory therapy assess nares and determine need for appliance change or resting period  Outcome: Progressing  Flowsheets (Taken 6/6/2025 0900)  Skin Integrity Remains Intact: Monitor for areas of redness and/or skin breakdown     Problem: Chronic Conditions and Co-morbidities  Goal: Patient's chronic conditions and co-morbidity symptoms are monitored and maintained or improved  Outcome: Progressing     Problem: ABCDS Injury Assessment  Goal: Absence of physical injury  Outcome: Progressing

## 2025-06-06 NOTE — PLAN OF CARE
Problem: Discharge Planning  Goal: Discharge to home or other facility with appropriate resources  6/6/2025 0418 by Tena Domingo RN  Outcome: Progressing     Problem: Pain  Goal: Verbalizes/displays adequate comfort level or baseline comfort level  6/6/2025 0418 by Tena Domingo RN  Outcome: Progressing     Problem: Safety - Adult  Goal: Free from fall injury  6/6/2025 0418 by Tena Domingo RN  Outcome: Progressing     Problem: Skin/Tissue Integrity  Goal: Skin integrity remains intact  Description: 1.  Monitor for areas of redness and/or skin breakdown2.  Assess vascular access sites hourly3.  Every 4-6 hours minimum:  Change oxygen saturation probe site4.  Every 4-6 hours:  If on nasal continuous positive airway pressure, respiratory therapy assess nares and determine need for appliance change or resting period  6/6/2025 0418 by Tena Domingo RN  Outcome: Progressing  Flowsheets (Taken 6/5/2025 2106)  Skin Integrity Remains Intact: Monitor for areas of redness and/or skin breakdown     Problem: Chronic Conditions and Co-morbidities  Goal: Patient's chronic conditions and co-morbidity symptoms are monitored and maintained or improved  6/6/2025 0418 by Tena Domingo RN  Outcome: Progressing

## 2025-06-06 NOTE — CARE COORDINATION
Case Management   Daily Progress Note       Patient Name: Mina Gill                   YOB: 1940  Diagnosis: Generalized weakness [R53.1]  Parkinson disease with dyskinesia (HCC) [G20.B1]  Hyperglycemia due to diabetes mellitus (HCC) [E11.65]                       GMLOS: 3.8 days  Length of Stay: 4  days    Anticipated Discharge Date: Discharge pending    Readmission Risk (Low < 19, Mod (19-27), High > 27): Readmission Risk Score: 25.3        Current Transitional Plan    [] Home Independently    [] Home with HC    [x] Skilled Nursing Facility    [] Acute Rehabilitation    [] Long Term Acute Care (LTAC)    [] Other:     Plan for the Stay (Medical Management) : Precert pending          Workflow Continuation (Additional Notes) : Writer called Ade with [facility] to ask about the precert and if they needed updated clinicals, left message for Ade to call back. CM clinical support checked precert this a.m. and show pre-cert with [facility] is pending.    1113 Beth from [facility] called back KACIE and stated that the pre-cert is still pending for [facility]. Writer called back and KACIE and asked for callback for supporting clinicals need.        Jigna Azevedo  June 6, 2025

## 2025-06-06 NOTE — PROGRESS NOTES
OBS/CDU   Progress NOTE      Patients PCP is:  Tisha Glasgow MD        SUBJECTIVE      No acute overnight events.     PHYSICAL EXAM      General: NAD, AO X 3  Heent: EOMI, PERRL  Neck: SUPPLE, NO JVD  Cardiovascular: RRR, S1S2  Pulmonary: CTAB, NO SOB  Abdomen: SOFT, NTTP, ND, +BS  Extremities: +2/4 PULSES DISTAL, NO SWELLING  Neuro / Psych: NO NUMBNESS OR TINGLING, MENTATION AT BASELINE    PERTINENT TEST /EXAMS      I have reviewed all available laboratory results.    MEDICATIONS CURRENT   insulin lispro (HUMALOG,ADMELOG) injection vial 0-8 Units, Q6H  melatonin tablet 5 mg, Nightly PRN  glucose chewable tablet 16 g, PRN  dextrose bolus 10% 125 mL, PRN   Or  dextrose bolus 10% 250 mL, PRN  glucagon injection 1 mg, PRN  dextrose 10 % infusion, Continuous PRN  calcium carbonate (TUMS) chewable tablet 500 mg, TID PRN  nitroGLYCERIN (NITROSTAT) SL tablet 0.4 mg, Q5 Min PRN  clopidogrel (PLAVIX) tablet 75 mg, Daily  tamsulosin (FLOMAX) capsule 0.4 mg, Daily  atorvastatin (LIPITOR) tablet 40 mg, Daily  fluticasone (FLONASE) 50 MCG/ACT nasal spray 2 spray, Daily PRN  magnesium oxide (MAG-OX) tablet 400 mg, Daily  albuterol sulfate HFA (PROVENTIL;VENTOLIN;PROAIR) 108 (90 Base) MCG/ACT inhaler 2 puff, Q6H PRN  pantoprazole (PROTONIX) tablet 20 mg, Daily  allopurinol (ZYLOPRIM) tablet 100 mg, Daily  sucralfate (CARAFATE) tablet 1 g, 4 times per day  rOPINIRole (REQUIP) tablet 0.5 mg, TID  latanoprost (XALATAN) 0.005 % ophthalmic solution 1 drop, Nightly  lidocaine 4 % external patch 1 patch, Daily  furosemide (LASIX) tablet 20 mg, Daily PRN  gabapentin (NEURONTIN) capsule 100 mg, Daily  sodium chloride flush 0.9 % injection 5-40 mL, 2 times per day  sodium chloride flush 0.9 % injection 5-40 mL, PRN  0.9 % sodium chloride infusion, PRN  potassium chloride (KLOR-CON M) extended release tablet 40 mEq, PRN   Or  potassium bicarb-citric acid (EFFER-K) effervescent tablet 40 mEq, PRN   Or  potassium chloride 10 mEq/100 mL IVPB  (Peripheral Line), PRN  enoxaparin (LOVENOX) injection 40 mg, Daily  ondansetron (ZOFRAN) injection 4 mg, Q4H PRN  polyethylene glycol (GLYCOLAX) packet 17 g, Daily PRN  acetaminophen (TYLENOL) tablet 650 mg, Q6H PRN   Or  acetaminophen (TYLENOL) suppository 650 mg, Q6H PRN  entacapone (COMTAN) tablet 200 mg, TID   And  carbidopa-levodopa (SINEMET)  MG per tablet 2 tablet, TID        All medication charted and reviewed.    CONSULTS      IP CONSULT TO CASE MANAGEMENT    ASSESSMENT/PLAN       Mina Gill is a 84 y.o. male who presents with need for ECF placement.  Patient unable to care for self at home.     Parkinson's.  Stable.  Patient with ongoing tremors.  Patient in reasonably good spirits today.  Looking forward to next phase of care  Elevation glucose  Patient with elevated hemoglobin A1c consistent with diabetes for some time  Continue to monitor sugars and control on sliding scale currently  Will need more definitive arrangement once situated ECF  PT OT  Ongoing care here.  Episodic.  Patient will do better in ECF  Trying to keep patient awake during the day and out of bed is much as possible  Placement  Patient accepted at facility.  Awaiting bed availability  Awaiting insurance company approval.  Continue home medications and pain control  Monitor vitals, labs, and imaging  DISPO: pending consults and clinical improvement    --  Jermaine Cristina MD  Observation Physician     This dictation was generated by voice recognition computer software.  Although all attempts are made to edit the dictation for accuracy, there may be errors in the transcription that are not intended.

## 2025-06-06 NOTE — PROGRESS NOTES
[facility]  CDU / OBSERVATION ENCOUNTER  ATTENDING NOTE         I performed a history and physical examination of the patient and discussed management with the resident or midlevel provider. I reviewed the resident or midlevel provider's note and agree with the documented findings and plan of care. Any areas of disagreement are noted on the chart. I was personally present for the key portions of any procedures. I have documented in the chart those procedures where I was not present during the key portions. I have reviewed the nurses notes. I agree with the chief complaint, past medical history, past surgical history, allergies, medications, social and family history as documented unless otherwise noted below.    The Family history, social history, and ROS are effectively unchanged since admission unless noted elsewhere in the chart.     This patient was placed in the observation unit for reevaluation for possible admission to the hospital     Ongoing reassessment while waiting for ECF placement.  Patient requiring ongoing supportive therapy and not appropriate for discharge home.  Concern over ability to care for self.  Patient awaiting preapproval.       Frank Kirby MD  Attending Emergency  Physician

## 2025-06-06 NOTE — PROGRESS NOTES
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  Past Surgical History:  has a past surgical history that includes Colonoscopy (2012); shoulder surgery (Right); pr colsc flx w/rmvl of tumor polyp lesion snare tq (N/A, 2017); Colonoscopy (2017); Cholecystectomy, laparoscopic (N/A, 10/29/2022); Esophagogastroduodenoscopy (2023); Upper gastrointestinal endoscopy (N/A, 2023); Upper gastrointestinal endoscopy (N/A, 2023); Cardiac procedure (N/A, 2024); Cardiac procedure (N/A, 2024); and ep device procedure (N/A, 2024).    Discharge Recommendations  Discharge Recommendations: Patient would benefit from continued therapy after discharge  PT Equipment Recommendations  Equipment Needed: Yes  Mobility Devices: Walker  Walker: Rolling  Other: pt has SPC in room, based on mobility ths date pt should use RW    Assessment  Body Structures, Functions, Activity Limitations Requiring Skilled Therapeutic Intervention: Decreased functional mobility ;Decreased ADL status;Decreased body mechanics;Decreased safe awareness;Decreased strength;Decreased coordination;Decreased high-level

## 2025-06-06 NOTE — PROGRESS NOTES
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  Past Surgical History:  has a past surgical history that includes Colonoscopy (2012); shoulder surgery (Right); pr colsc flx w/rmvl of tumor polyp lesion snare tq (N/A, 2017); Colonoscopy (2017); Cholecystectomy, laparoscopic (N/A, 10/29/2022); Esophagogastroduodenoscopy (2023); Upper gastrointestinal endoscopy (N/A, 2023); Upper gastrointestinal endoscopy (N/A, 2023); Cardiac procedure (N/A, 2024); Cardiac procedure (N/A, 2024); and ep device procedure (N/A, 2024).    Discharge Recommendations  Discharge Recommendations: Patient would benefit from continued therapy after discharge       Assessment  Performance deficits / Impairments: Decreased functional mobility ;Decreased ADL status;Decreased safe awareness;Decreased endurance;Decreased balance;Decreased strength  Assessment: Pt limited by above deficits impacting pts functional mobility/ADL performance. Pt is expected to require skilled OT services to increase safety and promote increased independence t/o ADL/IADLs and functional mobility tasks.  Prognosis:

## 2025-06-07 LAB
GLUCOSE BLD-MCNC: 127 MG/DL (ref 75–110)
GLUCOSE BLD-MCNC: 151 MG/DL (ref 75–110)
GLUCOSE BLD-MCNC: 157 MG/DL (ref 75–110)
GLUCOSE BLD-MCNC: 168 MG/DL (ref 75–110)
GLUCOSE BLD-MCNC: 226 MG/DL (ref 75–110)
GLUCOSE BLD-MCNC: 228 MG/DL (ref 75–110)

## 2025-06-07 PROCEDURE — 1200000000 HC SEMI PRIVATE

## 2025-06-07 PROCEDURE — 6360000002 HC RX W HCPCS: Performed by: EMERGENCY MEDICINE

## 2025-06-07 PROCEDURE — 6370000000 HC RX 637 (ALT 250 FOR IP): Performed by: EMERGENCY MEDICINE

## 2025-06-07 PROCEDURE — 6370000000 HC RX 637 (ALT 250 FOR IP)

## 2025-06-07 PROCEDURE — 2500000003 HC RX 250 WO HCPCS: Performed by: EMERGENCY MEDICINE

## 2025-06-07 PROCEDURE — 82947 ASSAY GLUCOSE BLOOD QUANT: CPT

## 2025-06-07 RX ORDER — INSULIN LISPRO 100 [IU]/ML
0-4 INJECTION, SOLUTION INTRAVENOUS; SUBCUTANEOUS
Status: DISCONTINUED | OUTPATIENT
Start: 2025-06-07 | End: 2025-06-09

## 2025-06-07 RX ORDER — INSULIN GLARGINE 100 [IU]/ML
8 INJECTION, SOLUTION SUBCUTANEOUS DAILY
Status: DISCONTINUED | OUTPATIENT
Start: 2025-06-07 | End: 2025-06-12 | Stop reason: HOSPADM

## 2025-06-07 RX ADMIN — ATORVASTATIN CALCIUM 40 MG: 40 TABLET, FILM COATED ORAL at 08:50

## 2025-06-07 RX ADMIN — INSULIN LISPRO 1 UNITS: 100 INJECTION, SOLUTION INTRAVENOUS; SUBCUTANEOUS at 18:18

## 2025-06-07 RX ADMIN — ANTACID TABLETS 500 MG: 500 TABLET, CHEWABLE ORAL at 04:34

## 2025-06-07 RX ADMIN — ENOXAPARIN SODIUM 40 MG: 100 INJECTION SUBCUTANEOUS at 08:49

## 2025-06-07 RX ADMIN — SODIUM CHLORIDE, PRESERVATIVE FREE 10 ML: 5 INJECTION INTRAVENOUS at 08:50

## 2025-06-07 RX ADMIN — ROPINIROLE HYDROCHLORIDE 0.5 MG: 0.25 TABLET, FILM COATED ORAL at 08:51

## 2025-06-07 RX ADMIN — CARBIDOPA AND LEVODOPA 2 TABLET: 25; 100 TABLET ORAL at 14:49

## 2025-06-07 RX ADMIN — SUCRALFATE 1 G: 1 TABLET ORAL at 04:30

## 2025-06-07 RX ADMIN — TAMSULOSIN HYDROCHLORIDE 0.4 MG: 0.4 CAPSULE ORAL at 08:50

## 2025-06-07 RX ADMIN — Medication 400 MG: at 08:50

## 2025-06-07 RX ADMIN — ROPINIROLE HYDROCHLORIDE 0.5 MG: 0.25 TABLET, FILM COATED ORAL at 20:35

## 2025-06-07 RX ADMIN — CARBIDOPA AND LEVODOPA 2 TABLET: 25; 100 TABLET ORAL at 20:35

## 2025-06-07 RX ADMIN — SUCRALFATE 1 G: 1 TABLET ORAL at 18:17

## 2025-06-07 RX ADMIN — ALLOPURINOL 100 MG: 100 TABLET ORAL at 08:51

## 2025-06-07 RX ADMIN — ACETAMINOPHEN 650 MG: 325 TABLET ORAL at 03:15

## 2025-06-07 RX ADMIN — LATANOPROST 1 DROP: 50 SOLUTION OPHTHALMIC at 20:39

## 2025-06-07 RX ADMIN — CLOPIDOGREL BISULFATE 75 MG: 75 TABLET, FILM COATED ORAL at 08:50

## 2025-06-07 RX ADMIN — ENTACAPONE 200 MG: 200 TABLET, FILM COATED ORAL at 20:35

## 2025-06-07 RX ADMIN — SODIUM CHLORIDE, PRESERVATIVE FREE 10 ML: 5 INJECTION INTRAVENOUS at 20:35

## 2025-06-07 RX ADMIN — SUCRALFATE 1 G: 1 TABLET ORAL at 22:07

## 2025-06-07 RX ADMIN — PANTOPRAZOLE SODIUM 20 MG: 20 TABLET, DELAYED RELEASE ORAL at 08:51

## 2025-06-07 RX ADMIN — ENTACAPONE 200 MG: 200 TABLET, FILM COATED ORAL at 08:51

## 2025-06-07 RX ADMIN — ONDANSETRON 4 MG: 2 INJECTION INTRAMUSCULAR; INTRAVENOUS at 11:18

## 2025-06-07 RX ADMIN — INSULIN GLARGINE 8 UNITS: 100 INJECTION, SOLUTION SUBCUTANEOUS at 13:22

## 2025-06-07 RX ADMIN — INSULIN LISPRO 1 UNITS: 100 INJECTION, SOLUTION INTRAVENOUS; SUBCUTANEOUS at 20:35

## 2025-06-07 RX ADMIN — CARBIDOPA AND LEVODOPA 2 TABLET: 25; 100 TABLET ORAL at 08:51

## 2025-06-07 RX ADMIN — ROPINIROLE HYDROCHLORIDE 0.5 MG: 0.25 TABLET, FILM COATED ORAL at 14:49

## 2025-06-07 RX ADMIN — ENTACAPONE 200 MG: 200 TABLET, FILM COATED ORAL at 14:50

## 2025-06-07 RX ADMIN — SUCRALFATE 1 G: 1 TABLET ORAL at 11:18

## 2025-06-07 RX ADMIN — GABAPENTIN 100 MG: 100 CAPSULE ORAL at 08:50

## 2025-06-07 ASSESSMENT — PAIN SCALES - GENERAL
PAINLEVEL_OUTOF10: 5
PAINLEVEL_OUTOF10: 5

## 2025-06-07 ASSESSMENT — PAIN DESCRIPTION - DESCRIPTORS: DESCRIPTORS: ACHING

## 2025-06-07 ASSESSMENT — PAIN DESCRIPTION - LOCATION: LOCATION: HEAD

## 2025-06-07 ASSESSMENT — PAIN - FUNCTIONAL ASSESSMENT: PAIN_FUNCTIONAL_ASSESSMENT: PREVENTS OR INTERFERES SOME ACTIVE ACTIVITIES AND ADLS

## 2025-06-07 NOTE — PROGRESS NOTES
OBS/CDU   Progress NOTE      Patients PCP is:  Tisha Glasgow MD        SUBJECTIVE      No acute overnight events. Blood sugar is optimizing.     PHYSICAL EXAM      General: NAD, AO X 3  Heent: EOMI, PERRL  Neck: SUPPLE, NO JVD  Cardiovascular: RRR, S1S2  Pulmonary: CTAB, NO SOB  Abdomen: SOFT, NTTP, ND, +BS  Extremities: +2/4 PULSES DISTAL, NO SWELLING  Neuro / Psych: NO NUMBNESS OR TINGLING, MENTATION AT BASELINE    PERTINENT TEST /EXAMS      I have reviewed all available laboratory results.    MEDICATIONS CURRENT   insulin lispro (HUMALOG,ADMELOG) injection vial 0-8 Units, Q6H  melatonin tablet 5 mg, Nightly PRN  glucose chewable tablet 16 g, PRN  dextrose bolus 10% 125 mL, PRN   Or  dextrose bolus 10% 250 mL, PRN  glucagon injection 1 mg, PRN  dextrose 10 % infusion, Continuous PRN  calcium carbonate (TUMS) chewable tablet 500 mg, TID PRN  nitroGLYCERIN (NITROSTAT) SL tablet 0.4 mg, Q5 Min PRN  clopidogrel (PLAVIX) tablet 75 mg, Daily  tamsulosin (FLOMAX) capsule 0.4 mg, Daily  atorvastatin (LIPITOR) tablet 40 mg, Daily  fluticasone (FLONASE) 50 MCG/ACT nasal spray 2 spray, Daily PRN  magnesium oxide (MAG-OX) tablet 400 mg, Daily  albuterol sulfate HFA (PROVENTIL;VENTOLIN;PROAIR) 108 (90 Base) MCG/ACT inhaler 2 puff, Q6H PRN  pantoprazole (PROTONIX) tablet 20 mg, Daily  allopurinol (ZYLOPRIM) tablet 100 mg, Daily  sucralfate (CARAFATE) tablet 1 g, 4 times per day  rOPINIRole (REQUIP) tablet 0.5 mg, TID  latanoprost (XALATAN) 0.005 % ophthalmic solution 1 drop, Nightly  lidocaine 4 % external patch 1 patch, Daily  furosemide (LASIX) tablet 20 mg, Daily PRN  gabapentin (NEURONTIN) capsule 100 mg, Daily  sodium chloride flush 0.9 % injection 5-40 mL, 2 times per day  sodium chloride flush 0.9 % injection 5-40 mL, PRN  0.9 % sodium chloride infusion, PRN  potassium chloride (KLOR-CON M) extended release tablet 40 mEq, PRN   Or  potassium bicarb-citric acid (EFFER-K) effervescent tablet 40 mEq, PRN   Or  potassium

## 2025-06-07 NOTE — CARE COORDINATION
Called Beth at [facility] left  requesting cb for auth status  9:45 Spoke with Beth at [facility], they did not start precert and have nothing to do with that precert.

## 2025-06-07 NOTE — PLAN OF CARE
Problem: Discharge Planning  Goal: Discharge to home or other facility with appropriate resources  6/7/2025 0407 by Tena Domingo RN  Outcome: Progressing     Problem: Pain  Goal: Verbalizes/displays adequate comfort level or baseline comfort level  6/7/2025 0407 by Tena Domingo RN  Outcome: Progressing     Problem: Safety - Adult  Goal: Free from fall injury  6/7/2025 0407 by Tena Domingo RN  Outcome: Progressing     Problem: Skin/Tissue Integrity  Goal: Skin integrity remains intact  Description: 1.  Monitor for areas of redness and/or skin breakdown2.  Assess vascular access sites hourly3.  Every 4-6 hours minimum:  Change oxygen saturation probe site4.  Every 4-6 hours:  If on nasal continuous positive airway pressure, respiratory therapy assess nares and determine need for appliance change or resting period  6/7/2025 0407 by Tena Domingo RN  Outcome: Progressing  Flowsheets (Taken 6/6/2025 1917)  Skin Integrity Remains Intact: Monitor for areas of redness and/or skin breakdown     Problem: Chronic Conditions and Co-morbidities  Goal: Patient's chronic conditions and co-morbidity symptoms are monitored and maintained or improved  6/7/2025 0407 by Tena Domingo RN  Outcome: Progressing

## 2025-06-07 NOTE — PLAN OF CARE
Problem: Discharge Planning  Goal: Discharge to home or other facility with appropriate resources  6/7/2025 1801 by Sneha Cochran RN  Outcome: Progressing  6/7/2025 0407 by Tena Domingo RN  Outcome: Progressing     Problem: Pain  Goal: Verbalizes/displays adequate comfort level or baseline comfort level  6/7/2025 1801 by Sneha Cochran RN  Outcome: Progressing  6/7/2025 0407 by Tena Domingo RN  Outcome: Progressing     Problem: Safety - Adult  Goal: Free from fall injury  6/7/2025 1801 by Sneha Cochran RN  Outcome: Progressing  6/7/2025 0407 by Tena Domingo RN  Outcome: Progressing     Problem: Skin/Tissue Integrity  Goal: Skin integrity remains intact  Description: 1.  Monitor for areas of redness and/or skin breakdown2.  Assess vascular access sites hourly3.  Every 4-6 hours minimum:  Change oxygen saturation probe site4.  Every 4-6 hours:  If on nasal continuous positive airway pressure, respiratory therapy assess nares and determine need for appliance change or resting period  6/7/2025 1801 by Sneha Cochran RN  Outcome: Progressing  6/7/2025 0407 by Tena Domingo RN  Outcome: Progressing  Flowsheets (Taken 6/6/2025 1917)  Skin Integrity Remains Intact: Monitor for areas of redness and/or skin breakdown     Problem: Chronic Conditions and Co-morbidities  Goal: Patient's chronic conditions and co-morbidity symptoms are monitored and maintained or improved  6/7/2025 1801 by Sneha Cochran RN  Outcome: Progressing  6/7/2025 0407 by Tena Domingo RN  Outcome: Progressing     Problem: ABCDS Injury Assessment  Goal: Absence of physical injury  6/7/2025 1801 by Sneha Cochran RN  Outcome: Progressing  6/7/2025 0407 by Tena Domingo RN  Outcome: Progressing  Flowsheets (Taken 6/6/2025 1917)  Absence of Physical Injury: Implement safety measures based on patient assessment

## 2025-06-07 NOTE — PROGRESS NOTES
[facility]  CDU / OBSERVATION ENCOUNTER  ATTENDING NOTE         I performed a history and physical examination of the patient and discussed management with the resident or midlevel provider. I reviewed the resident or midlevel provider's note and agree with the documented findings and plan of care. Any areas of disagreement are noted on the chart. I was personally present for the key portions of any procedures. I have documented in the chart those procedures where I was not present during the key portions. I have reviewed the nurses notes. I agree with the chief complaint, past medical history, past surgical history, allergies, medications, social and family history as documented unless otherwise noted below.    The Family history, social history, and ROS are effectively unchanged since admission unless noted elsewhere in the chart.     This patient was placed in the observation unit for reevaluation for possible admission to the hospital     Ongoing preapproval process.  Patient with no new complaint.  Patient requiring ongoing nursing care due to debilitating Parkinson's and tremor.  Patient is stable and awaiting ECF.    Patient did have elevation of the hemoglobin A1c to 8.5.  We have been controlling the patient's sugars on a sliding scale.  Will add longer acting insulin and see if we can get some better control.       Frank Kirby MD  Attending Emergency  Physician

## 2025-06-08 LAB
GLUCOSE BLD-MCNC: 147 MG/DL (ref 75–110)
GLUCOSE BLD-MCNC: 229 MG/DL (ref 75–110)
GLUCOSE BLD-MCNC: 232 MG/DL (ref 75–110)
GLUCOSE BLD-MCNC: 245 MG/DL (ref 75–110)

## 2025-06-08 PROCEDURE — 6370000000 HC RX 637 (ALT 250 FOR IP)

## 2025-06-08 PROCEDURE — 97110 THERAPEUTIC EXERCISES: CPT

## 2025-06-08 PROCEDURE — 1200000000 HC SEMI PRIVATE

## 2025-06-08 PROCEDURE — 97116 GAIT TRAINING THERAPY: CPT

## 2025-06-08 PROCEDURE — 6370000000 HC RX 637 (ALT 250 FOR IP): Performed by: EMERGENCY MEDICINE

## 2025-06-08 PROCEDURE — 6360000002 HC RX W HCPCS: Performed by: EMERGENCY MEDICINE

## 2025-06-08 PROCEDURE — 2500000003 HC RX 250 WO HCPCS: Performed by: EMERGENCY MEDICINE

## 2025-06-08 PROCEDURE — 82947 ASSAY GLUCOSE BLOOD QUANT: CPT

## 2025-06-08 PROCEDURE — 97530 THERAPEUTIC ACTIVITIES: CPT

## 2025-06-08 RX ADMIN — SUCRALFATE 1 G: 1 TABLET ORAL at 16:45

## 2025-06-08 RX ADMIN — Medication 400 MG: at 09:28

## 2025-06-08 RX ADMIN — ACETAMINOPHEN 650 MG: 325 TABLET ORAL at 20:42

## 2025-06-08 RX ADMIN — ALLOPURINOL 100 MG: 100 TABLET ORAL at 09:28

## 2025-06-08 RX ADMIN — SUCRALFATE 1 G: 1 TABLET ORAL at 05:04

## 2025-06-08 RX ADMIN — ENTACAPONE 200 MG: 200 TABLET, FILM COATED ORAL at 13:41

## 2025-06-08 RX ADMIN — ACETAMINOPHEN 650 MG: 325 TABLET ORAL at 05:10

## 2025-06-08 RX ADMIN — CARBIDOPA AND LEVODOPA 2 TABLET: 25; 100 TABLET ORAL at 13:41

## 2025-06-08 RX ADMIN — PANTOPRAZOLE SODIUM 20 MG: 20 TABLET, DELAYED RELEASE ORAL at 09:28

## 2025-06-08 RX ADMIN — ROPINIROLE HYDROCHLORIDE 0.5 MG: 0.25 TABLET, FILM COATED ORAL at 09:27

## 2025-06-08 RX ADMIN — INSULIN LISPRO 1 UNITS: 100 INJECTION, SOLUTION INTRAVENOUS; SUBCUTANEOUS at 20:29

## 2025-06-08 RX ADMIN — ROPINIROLE HYDROCHLORIDE 0.5 MG: 0.25 TABLET, FILM COATED ORAL at 13:41

## 2025-06-08 RX ADMIN — ENTACAPONE 200 MG: 200 TABLET, FILM COATED ORAL at 20:24

## 2025-06-08 RX ADMIN — CARBIDOPA AND LEVODOPA 2 TABLET: 25; 100 TABLET ORAL at 09:28

## 2025-06-08 RX ADMIN — GABAPENTIN 100 MG: 100 CAPSULE ORAL at 09:28

## 2025-06-08 RX ADMIN — SUCRALFATE 1 G: 1 TABLET ORAL at 22:56

## 2025-06-08 RX ADMIN — LATANOPROST 1 DROP: 50 SOLUTION OPHTHALMIC at 20:24

## 2025-06-08 RX ADMIN — ENTACAPONE 200 MG: 200 TABLET, FILM COATED ORAL at 09:28

## 2025-06-08 RX ADMIN — SODIUM CHLORIDE, PRESERVATIVE FREE 10 ML: 5 INJECTION INTRAVENOUS at 09:33

## 2025-06-08 RX ADMIN — ENOXAPARIN SODIUM 40 MG: 100 INJECTION SUBCUTANEOUS at 09:27

## 2025-06-08 RX ADMIN — CARBIDOPA AND LEVODOPA 2 TABLET: 25; 100 TABLET ORAL at 20:24

## 2025-06-08 RX ADMIN — INSULIN GLARGINE 8 UNITS: 100 INJECTION, SOLUTION SUBCUTANEOUS at 09:28

## 2025-06-08 RX ADMIN — CLOPIDOGREL BISULFATE 75 MG: 75 TABLET, FILM COATED ORAL at 09:28

## 2025-06-08 RX ADMIN — ATORVASTATIN CALCIUM 40 MG: 40 TABLET, FILM COATED ORAL at 09:28

## 2025-06-08 RX ADMIN — TAMSULOSIN HYDROCHLORIDE 0.4 MG: 0.4 CAPSULE ORAL at 09:28

## 2025-06-08 RX ADMIN — INSULIN LISPRO 1 UNITS: 100 INJECTION, SOLUTION INTRAVENOUS; SUBCUTANEOUS at 11:54

## 2025-06-08 RX ADMIN — SODIUM CHLORIDE, PRESERVATIVE FREE 10 ML: 5 INJECTION INTRAVENOUS at 20:24

## 2025-06-08 RX ADMIN — ROPINIROLE HYDROCHLORIDE 0.5 MG: 0.25 TABLET, FILM COATED ORAL at 20:24

## 2025-06-08 RX ADMIN — SUCRALFATE 1 G: 1 TABLET ORAL at 11:54

## 2025-06-08 RX ADMIN — INSULIN LISPRO 1 UNITS: 100 INJECTION, SOLUTION INTRAVENOUS; SUBCUTANEOUS at 16:45

## 2025-06-08 ASSESSMENT — PAIN SCALES - GENERAL
PAINLEVEL_OUTOF10: 2
PAINLEVEL_OUTOF10: 1

## 2025-06-08 NOTE — PROGRESS NOTES
air)    Subjective  General  Chart Reviewed: No  Patient assessed for rehabilitation services?: Yes  Response To Previous Treatment: Patient with no complaints from previous session.  Family/Caregiver Present: No  Follows Commands: Within Functional Limits  General  General Comments: Pt left seated in recliner with call light within reach, alarm activated.  Subjective  Subjective: Pt and RN agreeable to PT. Pt resting in bed upon arrival, very lethargic initially.  Pt has no c/o pain at rest, reports he feels \"very sleepy\".  Pain  Pre-Pain: 0  Post-Pain: 0    Objective  Orientation  Overall Orientation Status: Within Functional Limits  Orientation Level: Oriented X4  Cognition  Overall Cognitive Status: Exceptions  Arousal/Alertness: Appropriate responses to stimuli  Following Commands: Follows one step commands consistently;Follows multistep commands with increased time;Follows multistep commands with repetition  Attention Span: Attends with cues to redirect  Memory: Decreased recall of recent events;Decreased recall of biographical Information  Safety Judgement: Decreased awareness of need for assistance;Decreased awareness of need for safety  Problem Solving: Assistance required to identify errors made;Assistance required to correct errors made;Decreased awareness of errors  Insights: Decreased awareness of deficits  Initiation: Requires cues for some  Sequencing: Requires cues for some  Cognition Comment: soft spoken with garbled speech,  increased time for processing required with more complex instructions    Observation/Palpation  Posture: Fair  Observation: bilat UE tremor noted R>L    Mobility   Bed mobility  Supine to Sit: Minimal assistance (HHA for trunk progression)  Sit to Supine: Unable to assess (pt left seated in recliner)  Scooting: Contact guard assistance  Bed Mobility Comments: HOB elevated to ~30 degrees, pt reported feeling dizzy once seated EOB requiring ~5 mins seated EOB to  Equipment evaluation, education, & procurement, Neuromuscular re-education, Patient/Caregiver education & training, Stair training, Gait training, Safety education & training, Home exercise program, Therapeutic activities    Goals  Short Term Goals  Time Frame for Short Term Goals: 14 visits  Short Term Goal 1: Complete transfers with least restrictive AD and mod I  Short Term Goal 2: Complete 300 ft of gait with least restrictive AD and mod I  Short Term Goal 3: Complete 6 stairs with one handrail and mod I  Short Term Goal 4: Participate in 30 minutes of therapy to promote endurance    Minutes  PT Individual Minutes  Time In: 0851  Time Out: 0930  Minutes: 39  Time Code Minutes  Timed Code Treatment Minutes: 39 Minutes    Electronically signed by Candie Alvarez PTA on 6/8/25 at 9:59 AM EDT

## 2025-06-08 NOTE — PROGRESS NOTES
Spiritual Health History and Assessment/Progress Note  [facility]    (P) Rituals, Rites and Sacraments,  ,  ,      Name: Mina Gill MRN: [id number]    Age: 84 y.o.     Sex: male   Language: English   [place]ism: Adventism   Parkinson disease with dyskinesia (HCC)     Date: 6/8/2025            Total Time Calculated: (P) 15 min              Spiritual Assessment continued in [facility]        Referral/Consult From: (P) Rounding   Encounter Overview/Reason: (P) Rituals, Rites and Sacraments  Service Provided For: (P) Patient    Melissa, Belief, Meaning:   Patient identifies as spiritual, is connected with a melissa tradition or spiritual practice, and has beliefs or practices that help with coping during difficult times  Family/Friends No family/friends present      Importance and Influence:  Patient has spiritual/personal beliefs that influence decisions regarding their health  Family/Friends No family/friends present    Community:  Patient Other: Unknown  Family/Friends No family/friends present    Assessment and Plan of Care:     Patient was very receptive to spiritual support.  offered communion service in Mongolian. Patient is coping with his condition, but is engaged in conversation.    Patient Interventions include: Provided sacramental/Pentecostal ritual  Family/Friends Interventions include: No family/friends present    Patient Plan of Care: Spiritual Care available upon further referral  Family/Friends Plan of Care: No family/friends present    Electronically signed by Chaplain Ean Intern on 6/8/2025 at 3:50 PM       06/08/25 1549   Encounter Summary   Encounter Overview/Reason Rituals, Rites and Sacraments   Service Provided For Patient   [facility]   Support System Unknown   Last Encounter  06/08/25   Complexity of Encounter Low   Begin Time 0330   End Time  0345   Total Time Calculated 15 min   Rituals, Rites and Sacraments   Type Adventism Communion

## 2025-06-08 NOTE — PLAN OF CARE
Problem: Discharge Planning  Goal: Discharge to home or other facility with appropriate resources  6/8/2025 1402 by Delvin Turcios RN  Outcome: Progressing  6/8/2025 0232 by Neo Varghese RN  Outcome: Progressing     Problem: Pain  Goal: Verbalizes/displays adequate comfort level or baseline comfort level  6/8/2025 1402 by Delvin Turcios RN  Outcome: Progressing  6/8/2025 0232 by Neo Varghese RN  Outcome: Progressing     Problem: Safety - Adult  Goal: Free from fall injury  6/8/2025 1402 by Delvin Turcios RN  Outcome: Progressing  6/8/2025 0232 by Neo Varghese RN  Outcome: Progressing     Problem: Skin/Tissue Integrity  Goal: Skin integrity remains intact  Description: 1.  Monitor for areas of redness and/or skin breakdown2.  Assess vascular access sites hourly3.  Every 4-6 hours minimum:  Change oxygen saturation probe site4.  Every 4-6 hours:  If on nasal continuous positive airway pressure, respiratory therapy assess nares and determine need for appliance change or resting period  6/8/2025 1402 by Delvin Turcios RN  Outcome: Progressing  6/8/2025 0232 by Neo Varghese RN  Outcome: Progressing     Problem: Chronic Conditions and Co-morbidities  Goal: Patient's chronic conditions and co-morbidity symptoms are monitored and maintained or improved  6/8/2025 1402 by Delvin Turcios RN  Outcome: Progressing  6/8/2025 0232 by Neo Varghese RN  Outcome: Progressing     Problem: ABCDS Injury Assessment  Goal: Absence of physical injury  6/8/2025 1402 by Delvin Turcios RN  Outcome: Progressing  6/8/2025 0232 by Neo Varghese RN  Outcome: Progressing

## 2025-06-08 NOTE — PROGRESS NOTES
tablet 40 mEq, PRN   Or  potassium bicarb-citric acid (EFFER-K) effervescent tablet 40 mEq, PRN   Or  potassium chloride 10 mEq/100 mL IVPB (Peripheral Line), PRN  enoxaparin (LOVENOX) injection 40 mg, Daily  ondansetron (ZOFRAN) injection 4 mg, Q4H PRN  polyethylene glycol (GLYCOLAX) packet 17 g, Daily PRN  acetaminophen (TYLENOL) tablet 650 mg, Q6H PRN   Or  acetaminophen (TYLENOL) suppository 650 mg, Q6H PRN  entacapone (COMTAN) tablet 200 mg, TID   And  carbidopa-levodopa (SINEMET)  MG per tablet 2 tablet, TID        All medication charted and reviewed.    CONSULTS      IP CONSULT TO CASE MANAGEMENT  IP CONSULT TO VASCULAR ACCESS TEAM    ASSESSMENT/PLAN       Mina Gill is a 84 y.o. male who presents with need for ECF placement.  Patient unable to care for self at home.     Parkinson's.  Stable.  Patient with ongoing tremors.  Patient in reasonably good spirits today.  Looking forward to next phase of care  Elevation glucose  Patient with elevated hemoglobin A1c consistent with diabetes for some time  Continue to monitor sugars and control on sliding scale currently  Starting on Lantus with low dose corrective insulin sliding scale.   PT OT  Ongoing care here.  Episodic.  Patient will do better in ECF  Trying to keep patient awake during the day and out of bed is much as possible  Placement  Patient accepted at facility.  Awaiting bed availability  Awaiting insurance company approval.  Continue home medications and pain control  Monitor vitals, labs, and imaging  DISPO: pending consults and clinical improvement    --  Sarah Redman MD  Observation Physician     This dictation was generated by voice recognition computer software.  Although all attempts are made to edit the dictation for accuracy, there may be errors in the transcription that are not intended.

## 2025-06-08 NOTE — PROGRESS NOTES
OBS/CDU   Progress NOTE      Patients PCP is:  Tisha Glasgow MD        SUBJECTIVE      Patient reviewed with nursing.  Patient with no new issues.  Continue to monitor patient's sugars.  Continuing to wait for preapproval for moved to [facility]    PHYSICAL EXAM      General: NAD, AO X 3  Heent: EOMI, PERRL  Neck: SUPPLE, NO JVD  Cardiovascular: RRR, S1S2  Pulmonary: CTAB, NO SOB  Abdomen: SOFT, NTTP, ND, +BS  Extremities: +2/4 PULSES DISTAL, NO SWELLING  Neuro / Psych: NO NUMBNESS OR TINGLING, MENTATION AT BASELINE    PERTINENT TEST /EXAMS      I have reviewed all available laboratory results.    MEDICATIONS CURRENT   insulin glargine (LANTUS) injection vial 8 Units, Daily  insulin lispro (HUMALOG,ADMELOG) injection vial 0-4 Units, 4x Daily AC & HS  melatonin tablet 5 mg, Nightly PRN  glucose chewable tablet 16 g, PRN  dextrose bolus 10% 125 mL, PRN   Or  dextrose bolus 10% 250 mL, PRN  glucagon injection 1 mg, PRN  dextrose 10 % infusion, Continuous PRN  calcium carbonate (TUMS) chewable tablet 500 mg, TID PRN  nitroGLYCERIN (NITROSTAT) SL tablet 0.4 mg, Q5 Min PRN  clopidogrel (PLAVIX) tablet 75 mg, Daily  tamsulosin (FLOMAX) capsule 0.4 mg, Daily  atorvastatin (LIPITOR) tablet 40 mg, Daily  fluticasone (FLONASE) 50 MCG/ACT nasal spray 2 spray, Daily PRN  magnesium oxide (MAG-OX) tablet 400 mg, Daily  albuterol sulfate HFA (PROVENTIL;VENTOLIN;PROAIR) 108 (90 Base) MCG/ACT inhaler 2 puff, Q6H PRN  pantoprazole (PROTONIX) tablet 20 mg, Daily  allopurinol (ZYLOPRIM) tablet 100 mg, Daily  sucralfate (CARAFATE) tablet 1 g, 4 times per day  rOPINIRole (REQUIP) tablet 0.5 mg, TID  latanoprost (XALATAN) 0.005 % ophthalmic solution 1 drop, Nightly  lidocaine 4 % external patch 1 patch, Daily  furosemide (LASIX) tablet 20 mg, Daily PRN  gabapentin (NEURONTIN) capsule 100 mg, Daily  sodium chloride flush 0.9 % injection 5-40 mL, 2 times per day  sodium chloride flush 0.9 % injection 5-40 mL, PRN  0.9 % sodium chloride infusion,

## 2025-06-09 LAB
GLUCOSE BLD-MCNC: 165 MG/DL (ref 75–110)
GLUCOSE BLD-MCNC: 175 MG/DL (ref 75–110)
GLUCOSE BLD-MCNC: 205 MG/DL (ref 75–110)
GLUCOSE BLD-MCNC: 214 MG/DL (ref 75–110)

## 2025-06-09 PROCEDURE — 1200000000 HC SEMI PRIVATE

## 2025-06-09 PROCEDURE — 6370000000 HC RX 637 (ALT 250 FOR IP)

## 2025-06-09 PROCEDURE — 2500000003 HC RX 250 WO HCPCS: Performed by: EMERGENCY MEDICINE

## 2025-06-09 PROCEDURE — 97535 SELF CARE MNGMENT TRAINING: CPT

## 2025-06-09 PROCEDURE — 6370000000 HC RX 637 (ALT 250 FOR IP): Performed by: EMERGENCY MEDICINE

## 2025-06-09 PROCEDURE — 97110 THERAPEUTIC EXERCISES: CPT

## 2025-06-09 PROCEDURE — 82947 ASSAY GLUCOSE BLOOD QUANT: CPT

## 2025-06-09 PROCEDURE — 97530 THERAPEUTIC ACTIVITIES: CPT

## 2025-06-09 PROCEDURE — 6360000002 HC RX W HCPCS: Performed by: EMERGENCY MEDICINE

## 2025-06-09 RX ORDER — INSULIN LISPRO 100 [IU]/ML
3 INJECTION, SOLUTION INTRAVENOUS; SUBCUTANEOUS 2 TIMES DAILY WITH MEALS
Status: DISCONTINUED | OUTPATIENT
Start: 2025-06-09 | End: 2025-06-12 | Stop reason: HOSPADM

## 2025-06-09 RX ADMIN — CARBIDOPA AND LEVODOPA 2 TABLET: 25; 100 TABLET ORAL at 15:04

## 2025-06-09 RX ADMIN — ACETAMINOPHEN 650 MG: 325 TABLET ORAL at 10:41

## 2025-06-09 RX ADMIN — SODIUM CHLORIDE, PRESERVATIVE FREE 10 ML: 5 INJECTION INTRAVENOUS at 20:59

## 2025-06-09 RX ADMIN — ALLOPURINOL 100 MG: 100 TABLET ORAL at 10:41

## 2025-06-09 RX ADMIN — Medication 400 MG: at 10:41

## 2025-06-09 RX ADMIN — INSULIN LISPRO 3 UNITS: 100 INJECTION, SOLUTION INTRAVENOUS; SUBCUTANEOUS at 16:59

## 2025-06-09 RX ADMIN — GABAPENTIN 100 MG: 100 CAPSULE ORAL at 10:41

## 2025-06-09 RX ADMIN — Medication 5 MG: at 21:00

## 2025-06-09 RX ADMIN — ONDANSETRON 4 MG: 2 INJECTION INTRAMUSCULAR; INTRAVENOUS at 09:10

## 2025-06-09 RX ADMIN — SUCRALFATE 1 G: 1 TABLET ORAL at 05:44

## 2025-06-09 RX ADMIN — SUCRALFATE 1 G: 1 TABLET ORAL at 17:04

## 2025-06-09 RX ADMIN — LATANOPROST 1 DROP: 50 SOLUTION OPHTHALMIC at 21:04

## 2025-06-09 RX ADMIN — SUCRALFATE 1 G: 1 TABLET ORAL at 10:40

## 2025-06-09 RX ADMIN — TAMSULOSIN HYDROCHLORIDE 0.4 MG: 0.4 CAPSULE ORAL at 10:40

## 2025-06-09 RX ADMIN — CLOPIDOGREL BISULFATE 75 MG: 75 TABLET, FILM COATED ORAL at 10:41

## 2025-06-09 RX ADMIN — ENTACAPONE 200 MG: 200 TABLET, FILM COATED ORAL at 10:41

## 2025-06-09 RX ADMIN — PANTOPRAZOLE SODIUM 20 MG: 20 TABLET, DELAYED RELEASE ORAL at 10:41

## 2025-06-09 RX ADMIN — INSULIN GLARGINE 8 UNITS: 100 INJECTION, SOLUTION SUBCUTANEOUS at 10:40

## 2025-06-09 RX ADMIN — ACETAMINOPHEN 650 MG: 325 TABLET ORAL at 05:11

## 2025-06-09 RX ADMIN — ENTACAPONE 200 MG: 200 TABLET, FILM COATED ORAL at 15:03

## 2025-06-09 RX ADMIN — INSULIN LISPRO 1 UNITS: 100 INJECTION, SOLUTION INTRAVENOUS; SUBCUTANEOUS at 12:25

## 2025-06-09 RX ADMIN — ENOXAPARIN SODIUM 40 MG: 100 INJECTION SUBCUTANEOUS at 10:40

## 2025-06-09 RX ADMIN — ENTACAPONE 200 MG: 200 TABLET, FILM COATED ORAL at 21:00

## 2025-06-09 RX ADMIN — ACETAMINOPHEN 650 MG: 325 TABLET ORAL at 21:00

## 2025-06-09 RX ADMIN — CARBIDOPA AND LEVODOPA 2 TABLET: 25; 100 TABLET ORAL at 10:41

## 2025-06-09 RX ADMIN — SODIUM CHLORIDE, PRESERVATIVE FREE 10 ML: 5 INJECTION INTRAVENOUS at 09:11

## 2025-06-09 RX ADMIN — SUCRALFATE 1 G: 1 TABLET ORAL at 22:38

## 2025-06-09 RX ADMIN — ATORVASTATIN CALCIUM 40 MG: 40 TABLET, FILM COATED ORAL at 10:41

## 2025-06-09 RX ADMIN — ROPINIROLE HYDROCHLORIDE 0.5 MG: 0.25 TABLET, FILM COATED ORAL at 10:40

## 2025-06-09 RX ADMIN — ROPINIROLE HYDROCHLORIDE 0.5 MG: 0.25 TABLET, FILM COATED ORAL at 15:03

## 2025-06-09 RX ADMIN — CARBIDOPA AND LEVODOPA 2 TABLET: 25; 100 TABLET ORAL at 21:01

## 2025-06-09 RX ADMIN — ROPINIROLE HYDROCHLORIDE 0.5 MG: 0.25 TABLET, FILM COATED ORAL at 21:00

## 2025-06-09 ASSESSMENT — PAIN DESCRIPTION - DESCRIPTORS
DESCRIPTORS: ACHING
DESCRIPTORS: ACHING

## 2025-06-09 ASSESSMENT — PAIN DESCRIPTION - LOCATION
LOCATION: HEAD
LOCATION: HEAD

## 2025-06-09 ASSESSMENT — PAIN DESCRIPTION - ORIENTATION
ORIENTATION: MID
ORIENTATION: MID

## 2025-06-09 ASSESSMENT — PAIN SCALES - GENERAL
PAINLEVEL_OUTOF10: 4
PAINLEVEL_OUTOF10: 8
PAINLEVEL_OUTOF10: 3
PAINLEVEL_OUTOF10: 0
PAINLEVEL_OUTOF10: 1

## 2025-06-09 ASSESSMENT — PAIN - FUNCTIONAL ASSESSMENT: PAIN_FUNCTIONAL_ASSESSMENT: ACTIVITIES ARE NOT PREVENTED

## 2025-06-09 NOTE — PROGRESS NOTES
Nutrition Assessment     Type and Reason for Visit: Initial, LOS    Nutrition Recommendations/Plan:   Continue current diet.     Malnutrition Assessment:  Malnutrition Status: No malnutrition    Nutrition Assessment:  Pt admit w/ parkinson's disease w/ dyskinesia. Seen for LOS. PMH: GERD, CKD, parkinson's. Per chart review varied intakes averaging 50-75% of meals. At visit pt was still eating breakfast 25-50% ate so far. Pt denies any appetite loss since admit and PTA. Per EMR mild non-significant wt loss noted over past 3 months. Pt currently waiting on placement to facility after discharge. No acute nutrition needs noted at this time.      Nutrition Related Findings:   No edema. LBM 6/6. Labs/meds reviewed. Wound Type: None    Current Nutrition Therapies:    ADULT DIET; Easy to Chew; 3 carb choices (45 gm/meal); High Fiber    Anthropometric Measures:  Height: 170.2 cm (5' 7.01\")  Current Body Wt: 81.6 kg (179 lb 14.3 oz)   BMI: 28.2        Nutrition Diagnosis:   No nutrition diagnosis at this time       Nutrition Interventions:   Food and/or Nutrient Delivery: Continue Current Diet    Discharge Planning:    No discharge needs at this time     ALONDRA DUARTE RD  Contact: [phone]   Mobile: [phone]

## 2025-06-09 NOTE — CARE COORDINATION
Case Management   Daily Progress Note       Patient Name: Mina Gill                   YOB: 1940  Diagnosis: Generalized weakness [R53.1]  Parkinson disease with dyskinesia (HCC) [G20.B1]  Hyperglycemia due to diabetes mellitus (HCC) [E11.65]                       GMLOS: 3.8 days  Length of Stay: 7  days    Anticipated Discharge Date: Discharge pending    Readmission Risk (Low < 19, Mod (19-27), High > 27): Readmission Risk Score: 25.3        Current Transitional Plan    [] Home Independently    [] Home with HC    [x] Skilled Nursing Facility    [] Acute Rehabilitation    [] Long Term Acute Care (LTAC)    [] Other:     Plan for the Stay (Medical Management) : N/A waiting pre-cert          Workflow Continuation (Additional Notes) : Jenae from patient's [employer] called and asked for writer to email clinicals directly to her at [email or web address] so she can start reviewing patient for precert authorization. Jenae also stated that the precert was never started for [facility] due to [facility] precert was never closed. Jenae stated that she closed [facility] and will have an answer within 24 hours once she receives patient information. Writer sent encrypted email with requested clinicals and facesheet and also request for delivery receipt and read receipt.        Jigna Azevedo  June 9, 2025

## 2025-06-09 NOTE — PLAN OF CARE
Problem: Discharge Planning  Goal: Discharge to home or other facility with appropriate resources  Outcome: Progressing     Problem: Pain  Goal: Verbalizes/displays adequate comfort level or baseline comfort level  Outcome: Progressing     Problem: Safety - Adult  Goal: Free from fall injury  Outcome: Progressing     Problem: Skin/Tissue Integrity  Goal: Skin integrity remains intact  Description: 1.  Monitor for areas of redness and/or skin breakdown2.  Assess vascular access sites hourly3.  Every 4-6 hours minimum:  Change oxygen saturation probe site4.  Every 4-6 hours:  If on nasal continuous positive airway pressure, respiratory therapy assess nares and determine need for appliance change or resting period  Outcome: Progressing     Problem: Chronic Conditions and Co-morbidities  Goal: Patient's chronic conditions and co-morbidity symptoms are monitored and maintained or improved  Outcome: Progressing     Problem: ABCDS Injury Assessment  Goal: Absence of physical injury  Outcome: Progressing

## 2025-06-09 NOTE — PROGRESS NOTES
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  Past Surgical History:  has a past surgical history that includes Colonoscopy (2012); shoulder surgery (Right); pr colsc flx w/rmvl of tumor polyp lesion snare tq (N/A, 2017); Colonoscopy (2017); Cholecystectomy, laparoscopic (N/A, 10/29/2022); Esophagogastroduodenoscopy (2023); Upper gastrointestinal endoscopy (N/A, 2023); Upper gastrointestinal endoscopy (N/A, 2023); Cardiac procedure (N/A, 2024); Cardiac procedure (N/A, 2024); and ep device procedure (N/A, 2024).    Discharge Recommendations  Discharge Recommendations: Patient would benefit from continued therapy after discharge  PT Equipment Recommendations  Equipment Needed: Yes  Mobility Devices: Walker  Walker: Rolling  Other: pt has SPC in room, based on mobility this date pt should use RW    Assessment  Body Structures, Functions, Activity Limitations Requiring Skilled Therapeutic Intervention: Decreased functional mobility ;Decreased ADL status;Decreased body mechanics;Decreased safe awareness;Decreased strength;Decreased coordination;Decreased high-level

## 2025-06-09 NOTE — PROGRESS NOTES
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  Past Surgical History:  has a past surgical history that includes Colonoscopy (2012); shoulder surgery (Right); pr colsc flx w/rmvl of tumor polyp lesion snare tq (N/A, 2017); Colonoscopy (2017); Cholecystectomy, laparoscopic (N/A, 10/29/2022); Esophagogastroduodenoscopy (2023); Upper gastrointestinal endoscopy (N/A, 2023); Upper gastrointestinal endoscopy (N/A, 2023); Cardiac procedure (N/A, 2024); Cardiac procedure (N/A, 2024); and ep device procedure (N/A, 2024).    Discharge Recommendations  Discharge Recommendations: Patient would benefit from continued therapy after discharge     Assessment  Performance deficits / Impairments: Decreased functional mobility ;Decreased ADL status;Decreased safe awareness;Decreased endurance;Decreased balance;Decreased strength  Assessment: Pt would benefit from continued acute care and post acute care OT to address above listed deficits.  Prognosis: Good  Activity Tolerance  Activity Tolerance: Patient Tolerated treatment well;Patient limited by fatigue  Safety Devices  Type of

## 2025-06-09 NOTE — PROGRESS NOTES
OBS/CDU   Progress NOTE      Patients PCP is:  Tisha Glasgow MD        SUBJECTIVE      No acute overnight events.  Patient denies any new complaints, blood sugar is under control.     PHYSICAL EXAM      General: NAD, AO X 3  Heent: EOMI, PERRL  Neck: SUPPLE, NO JVD  Cardiovascular: RRR, S1S2  Pulmonary: CTAB, NO SOB  Abdomen: SOFT, NTTP, ND, +BS  Extremities: +2/4 PULSES DISTAL, NO SWELLING  Neuro / Psych: NO NUMBNESS OR TINGLING, MENTATION AT BASELINE    PERTINENT TEST /EXAMS      I have reviewed all available laboratory results.    MEDICATIONS CURRENT   insulin lispro (HUMALOG,ADMELOG) injection vial 3 Units, BID WC  insulin glargine (LANTUS) injection vial 8 Units, Daily  melatonin tablet 5 mg, Nightly PRN  glucose chewable tablet 16 g, PRN  dextrose bolus 10% 125 mL, PRN   Or  dextrose bolus 10% 250 mL, PRN  glucagon injection 1 mg, PRN  dextrose 10 % infusion, Continuous PRN  calcium carbonate (TUMS) chewable tablet 500 mg, TID PRN  nitroGLYCERIN (NITROSTAT) SL tablet 0.4 mg, Q5 Min PRN  clopidogrel (PLAVIX) tablet 75 mg, Daily  tamsulosin (FLOMAX) capsule 0.4 mg, Daily  atorvastatin (LIPITOR) tablet 40 mg, Daily  fluticasone (FLONASE) 50 MCG/ACT nasal spray 2 spray, Daily PRN  magnesium oxide (MAG-OX) tablet 400 mg, Daily  albuterol sulfate HFA (PROVENTIL;VENTOLIN;PROAIR) 108 (90 Base) MCG/ACT inhaler 2 puff, Q6H PRN  pantoprazole (PROTONIX) tablet 20 mg, Daily  allopurinol (ZYLOPRIM) tablet 100 mg, Daily  sucralfate (CARAFATE) tablet 1 g, 4 times per day  rOPINIRole (REQUIP) tablet 0.5 mg, TID  latanoprost (XALATAN) 0.005 % ophthalmic solution 1 drop, Nightly  lidocaine 4 % external patch 1 patch, Daily  furosemide (LASIX) tablet 20 mg, Daily PRN  gabapentin (NEURONTIN) capsule 100 mg, Daily  sodium chloride flush 0.9 % injection 5-40 mL, 2 times per day  sodium chloride flush 0.9 % injection 5-40 mL, PRN  0.9 % sodium chloride infusion, PRN  potassium chloride (KLOR-CON M) extended release tablet 40 mEq,

## 2025-06-09 NOTE — PROGRESS NOTES
[facility]  CDU / OBSERVATION ENCOUNTER  ATTENDING NOTE         I performed a history and physical examination of the patient and discussed management with the resident or midlevel provider. I reviewed the resident or midlevel provider's note and agree with the documented findings and plan of care. Any areas of disagreement are noted on the chart. I was personally present for the key portions of any procedures. I have documented in the chart those procedures where I was not present during the key portions. I have reviewed the nurses notes. I agree with the chief complaint, past medical history, past surgical history, allergies, medications, social and family history as documented unless otherwise noted below.    The Family history, social history, and ROS are effectively unchanged since admission unless noted elsewhere in the chart.     This patient was placed in the observation unit for reevaluation for possible admission to the hospital     No new complaint today.  Ongoing reassessment.  Patient for ECF placement.  Will refresh JOVANNA as needed.  Discussed with case management.  Discussed with nursing       Frank Kirby MD  Attending Emergency  Physician

## 2025-06-09 NOTE — PLAN OF CARE
Problem: Discharge Planning  Goal: Discharge to home or other facility with appropriate resources  6/9/2025 0243 by Neo Varghese RN  Outcome: Progressing  6/8/2025 1402 by Delvin Turcios RN  Outcome: Progressing     Problem: Pain  Goal: Verbalizes/displays adequate comfort level or baseline comfort level  6/9/2025 0243 by Neo Varghese RN  Outcome: Progressing  6/8/2025 1402 by Delvin Turcios RN  Outcome: Progressing     Problem: Safety - Adult  Goal: Free from fall injury  6/9/2025 0243 by Neo Varghese RN  Outcome: Progressing  6/8/2025 1402 by Delvin Turcios RN  Outcome: Progressing     Problem: Skin/Tissue Integrity  Goal: Skin integrity remains intact  Description: 1.  Monitor for areas of redness and/or skin breakdown2.  Assess vascular access sites hourly3.  Every 4-6 hours minimum:  Change oxygen saturation probe site4.  Every 4-6 hours:  If on nasal continuous positive airway pressure, respiratory therapy assess nares and determine need for appliance change or resting period  6/9/2025 0243 by Neo Varghese RN  Outcome: Progressing  6/8/2025 1402 by Delvin Turcios RN  Outcome: Progressing     Problem: Chronic Conditions and Co-morbidities  Goal: Patient's chronic conditions and co-morbidity symptoms are monitored and maintained or improved  6/9/2025 0243 by Neo Varghese RN  Outcome: Progressing  6/8/2025 1402 by Delvin Turcios, RN  Outcome: Progressing     Problem: ABCDS Injury Assessment  Goal: Absence of physical injury  6/9/2025 0243 by Neo Varghese RN  Outcome: Progressing  6/8/2025 1402 by Delvin Turcios RN  Outcome: Progressing

## 2025-06-10 LAB
EKG ATRIAL RATE: 71 BPM
EKG P-R INTERVAL: 196 MS
EKG Q-T INTERVAL: 450 MS
EKG QRS DURATION: 168 MS
EKG QTC CALCULATION (BAZETT): 489 MS
EKG R AXIS: -79 DEGREES
EKG T AXIS: 102 DEGREES
EKG VENTRICULAR RATE: 71 BPM
GLUCOSE BLD-MCNC: 137 MG/DL (ref 75–110)
GLUCOSE BLD-MCNC: 143 MG/DL (ref 75–110)
GLUCOSE BLD-MCNC: 172 MG/DL (ref 75–110)
GLUCOSE BLD-MCNC: 222 MG/DL (ref 75–110)
GLUCOSE BLD-MCNC: 235 MG/DL (ref 75–110)

## 2025-06-10 PROCEDURE — 97116 GAIT TRAINING THERAPY: CPT

## 2025-06-10 PROCEDURE — 6360000002 HC RX W HCPCS: Performed by: EMERGENCY MEDICINE

## 2025-06-10 PROCEDURE — 2500000003 HC RX 250 WO HCPCS: Performed by: EMERGENCY MEDICINE

## 2025-06-10 PROCEDURE — 6370000000 HC RX 637 (ALT 250 FOR IP): Performed by: EMERGENCY MEDICINE

## 2025-06-10 PROCEDURE — 97530 THERAPEUTIC ACTIVITIES: CPT

## 2025-06-10 PROCEDURE — 82947 ASSAY GLUCOSE BLOOD QUANT: CPT

## 2025-06-10 PROCEDURE — 1200000000 HC SEMI PRIVATE

## 2025-06-10 PROCEDURE — 93010 ELECTROCARDIOGRAM REPORT: CPT | Performed by: INTERNAL MEDICINE

## 2025-06-10 PROCEDURE — 6370000000 HC RX 637 (ALT 250 FOR IP)

## 2025-06-10 PROCEDURE — 93005 ELECTROCARDIOGRAM TRACING: CPT | Performed by: EMERGENCY MEDICINE

## 2025-06-10 RX ADMIN — INSULIN LISPRO 3 UNITS: 100 INJECTION, SOLUTION INTRAVENOUS; SUBCUTANEOUS at 17:52

## 2025-06-10 RX ADMIN — CARBIDOPA AND LEVODOPA 2 TABLET: 25; 100 TABLET ORAL at 19:58

## 2025-06-10 RX ADMIN — TAMSULOSIN HYDROCHLORIDE 0.4 MG: 0.4 CAPSULE ORAL at 08:54

## 2025-06-10 RX ADMIN — ACETAMINOPHEN 650 MG: 325 TABLET ORAL at 06:03

## 2025-06-10 RX ADMIN — INSULIN LISPRO 3 UNITS: 100 INJECTION, SOLUTION INTRAVENOUS; SUBCUTANEOUS at 08:54

## 2025-06-10 RX ADMIN — SUCRALFATE 1 G: 1 TABLET ORAL at 06:00

## 2025-06-10 RX ADMIN — LATANOPROST 1 DROP: 50 SOLUTION OPHTHALMIC at 19:58

## 2025-06-10 RX ADMIN — CARBIDOPA AND LEVODOPA 2 TABLET: 25; 100 TABLET ORAL at 08:54

## 2025-06-10 RX ADMIN — GABAPENTIN 100 MG: 100 CAPSULE ORAL at 08:54

## 2025-06-10 RX ADMIN — SUCRALFATE 1 G: 1 TABLET ORAL at 12:41

## 2025-06-10 RX ADMIN — INSULIN GLARGINE 8 UNITS: 100 INJECTION, SOLUTION SUBCUTANEOUS at 08:54

## 2025-06-10 RX ADMIN — ROPINIROLE HYDROCHLORIDE 0.5 MG: 0.25 TABLET, FILM COATED ORAL at 13:46

## 2025-06-10 RX ADMIN — ATORVASTATIN CALCIUM 40 MG: 40 TABLET, FILM COATED ORAL at 08:54

## 2025-06-10 RX ADMIN — ROPINIROLE HYDROCHLORIDE 0.5 MG: 0.25 TABLET, FILM COATED ORAL at 19:58

## 2025-06-10 RX ADMIN — CARBIDOPA AND LEVODOPA 2 TABLET: 25; 100 TABLET ORAL at 13:46

## 2025-06-10 RX ADMIN — ENTACAPONE 200 MG: 200 TABLET, FILM COATED ORAL at 19:58

## 2025-06-10 RX ADMIN — SODIUM CHLORIDE, PRESERVATIVE FREE 10 ML: 5 INJECTION INTRAVENOUS at 19:59

## 2025-06-10 RX ADMIN — ENTACAPONE 200 MG: 200 TABLET, FILM COATED ORAL at 13:46

## 2025-06-10 RX ADMIN — ENOXAPARIN SODIUM 40 MG: 100 INJECTION SUBCUTANEOUS at 08:53

## 2025-06-10 RX ADMIN — ALLOPURINOL 100 MG: 100 TABLET ORAL at 08:54

## 2025-06-10 RX ADMIN — SUCRALFATE 1 G: 1 TABLET ORAL at 17:53

## 2025-06-10 RX ADMIN — CLOPIDOGREL BISULFATE 75 MG: 75 TABLET, FILM COATED ORAL at 08:54

## 2025-06-10 RX ADMIN — SODIUM CHLORIDE, PRESERVATIVE FREE 10 ML: 5 INJECTION INTRAVENOUS at 12:41

## 2025-06-10 RX ADMIN — POLYETHYLENE GLYCOL 3350 17 G: 17 POWDER, FOR SOLUTION ORAL at 12:41

## 2025-06-10 RX ADMIN — ROPINIROLE HYDROCHLORIDE 0.5 MG: 0.25 TABLET, FILM COATED ORAL at 08:54

## 2025-06-10 RX ADMIN — ENTACAPONE 200 MG: 200 TABLET, FILM COATED ORAL at 08:54

## 2025-06-10 RX ADMIN — PANTOPRAZOLE SODIUM 20 MG: 20 TABLET, DELAYED RELEASE ORAL at 08:55

## 2025-06-10 RX ADMIN — Medication 400 MG: at 08:54

## 2025-06-10 ASSESSMENT — PAIN SCALES - GENERAL
PAINLEVEL_OUTOF10: 8
PAINLEVEL_OUTOF10: 6

## 2025-06-10 ASSESSMENT — PAIN DESCRIPTION - ORIENTATION: ORIENTATION: RIGHT;LEFT;MID

## 2025-06-10 ASSESSMENT — PAIN DESCRIPTION - DESCRIPTORS: DESCRIPTORS: ACHING

## 2025-06-10 ASSESSMENT — PAIN DESCRIPTION - LOCATION: LOCATION: HEAD

## 2025-06-10 NOTE — CARE COORDINATION
Adis Goss auth is pending for [facility]. Auth ID#[id number]

## 2025-06-10 NOTE — PROGRESS NOTES
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  Past Surgical History:  has a past surgical history that includes Colonoscopy (2012); shoulder surgery (Right); pr colsc flx w/rmvl of tumor polyp lesion snare tq (N/A, 2017); Colonoscopy (2017); Cholecystectomy, laparoscopic (N/A, 10/29/2022); Esophagogastroduodenoscopy (2023); Upper gastrointestinal endoscopy (N/A, 2023); Upper gastrointestinal endoscopy (N/A, 2023); Cardiac procedure (N/A, 2024); Cardiac procedure (N/A, 2024); and ep device procedure (N/A, 2024).    Discharge Recommendations  Discharge Recommendations: Patient would benefit from continued therapy after discharge  PT Equipment Recommendations  Equipment Needed: No  Mobility Devices: Walker  Walker: Rolling  Other: pt has SPC in room, based on mobility this date pt should use RW    Assessment  Body Structures, Functions, Activity Limitations Requiring Skilled Therapeutic Intervention: Decreased functional mobility ;Decreased ADL status;Decreased body mechanics;Decreased safe awareness;Decreased strength;Decreased coordination;Decreased high-level

## 2025-06-10 NOTE — PLAN OF CARE
Problem: Discharge Planning  Goal: Discharge to home or other facility with appropriate resources  6/10/2025 1629 by Danay Squires RN  Outcome: Progressing  6/10/2025 0444 by Lorin Vidal RN  Outcome: Progressing  6/10/2025 0442 by Lorin Vidal RN  Outcome: Progressing  6/10/2025 0441 by Lorin Vidal RN  Outcome: Progressing     Problem: Pain  Goal: Verbalizes/displays adequate comfort level or baseline comfort level  6/10/2025 1629 by Danay Squires RN  Outcome: Progressing  6/10/2025 0444 by Lorin Vidal RN  Outcome: Progressing  6/10/2025 0442 by Lorin Vidal RN  Outcome: Progressing  6/10/2025 0441 by Lorin Vidal RN  Outcome: Progressing     Problem: Safety - Adult  Goal: Free from fall injury  6/10/2025 1629 by Danay Squires RN  Outcome: Progressing  6/10/2025 0444 by Lorin Vidal RN  Outcome: Progressing  6/10/2025 0442 by Lorin Vidal RN  Outcome: Progressing  6/10/2025 0441 by Lorin Vidal RN  Outcome: Progressing     Problem: Skin/Tissue Integrity  Goal: Skin integrity remains intact  Description: 1.  Monitor for areas of redness and/or skin breakdown2.  Assess vascular access sites hourly3.  Every 4-6 hours minimum:  Change oxygen saturation probe site4.  Every 4-6 hours:  If on nasal continuous positive airway pressure, respiratory therapy assess nares and determine need for appliance change or resting period  6/10/2025 1629 by Danay Squires RN  Outcome: Progressing  6/10/2025 0444 by Lorin iVdal RN  Outcome: Progressing  6/10/2025 0442 by Lorin Vidal RN  Outcome: Progressing  6/10/2025 0441 by Lorin Vidal RN  Outcome: Progressing     Problem: Chronic Conditions and Co-morbidities  Goal: Patient's chronic conditions and co-morbidity symptoms are monitored and maintained or improved  6/10/2025 1629 by Danay Squires RN  Outcome: Progressing  6/10/2025 0441 by Lorin Vidal RN  Outcome: Progressing     Problem: ABCDS Injury Assessment  Goal:  Absence of physical injury  6/10/2025 1629 by Danay Squires RN  Outcome: Progressing  6/10/2025 0441 by Lorin Vidal, RN  Outcome: Progressing

## 2025-06-10 NOTE — PLAN OF CARE
Problem: Discharge Planning  Goal: Discharge to home or other facility with appropriate resources  6/10/2025 0444 by Lorin Vidal RN  Outcome: Progressing  6/10/2025 0442 by Lorin Vidal RN  Outcome: Progressing  6/10/2025 0441 by Lorin Vidal RN  Outcome: Progressing  6/9/2025 1718 by Danay Squires RN  Outcome: Progressing     Problem: Pain  Goal: Verbalizes/displays adequate comfort level or baseline comfort level  6/10/2025 0444 by Lorin Vidal RN  Outcome: Progressing  6/10/2025 0442 by Lorin Vidal RN  Outcome: Progressing  6/10/2025 0441 by Lorin Vidal RN  Outcome: Progressing  6/9/2025 1718 by Danay Squires RN  Outcome: Progressing     Problem: Safety - Adult  Goal: Free from fall injury  6/10/2025 0444 by Lorin Vidal RN  Outcome: Progressing  6/10/2025 0442 by Lorin Vidal RN  Outcome: Progressing  6/10/2025 0441 by Lorin Vidal RN  Outcome: Progressing  6/9/2025 1718 by Danay Squires RN  Outcome: Progressing     Problem: Skin/Tissue Integrity  Goal: Skin integrity remains intact  Description: 1.  Monitor for areas of redness and/or skin breakdown2.  Assess vascular access sites hourly3.  Every 4-6 hours minimum:  Change oxygen saturation probe site4.  Every 4-6 hours:  If on nasal continuous positive airway pressure, respiratory therapy assess nares and determine need for appliance change or resting period  6/10/2025 0444 by Lorin Vidal RN  Outcome: Progressing  6/10/2025 0442 by Lorin Vidal RN  Outcome: Progressing  6/10/2025 0441 by Lorin Vidal RN  Outcome: Progressing  6/9/2025 1718 by Danay Squires RN  Outcome: Progressing     Problem: ABCDS Injury Assessment  Goal: Absence of physical injury  6/10/2025 0441 by Lorin Vidal RN  Outcome: Progressing  6/9/2025 1718 by Danay Squires RN  Outcome: Progressing

## 2025-06-10 NOTE — PROGRESS NOTES
[facility]  CDU / OBSERVATION ENCOUNTER  ATTENDING NOTE      The Family history, social history, and ROS are effectively unchanged since admission unless noted elsewhere in the chart.     This patient was placed in the observation unit for reevaluation for possible admission to the hospital     No new complaint.  Previous symptoms with occasional episodes of weakness.  Consistent with previous presentation.  Patient at baseline ready for ECF.       Frank Kirby MD  Attending Emergency  Physician

## 2025-06-11 LAB
GLUCOSE BLD-MCNC: 130 MG/DL (ref 75–110)
GLUCOSE BLD-MCNC: 167 MG/DL (ref 75–110)
GLUCOSE BLD-MCNC: 175 MG/DL (ref 75–110)
GLUCOSE BLD-MCNC: 268 MG/DL (ref 75–110)

## 2025-06-11 PROCEDURE — 82947 ASSAY GLUCOSE BLOOD QUANT: CPT

## 2025-06-11 PROCEDURE — 6370000000 HC RX 637 (ALT 250 FOR IP): Performed by: EMERGENCY MEDICINE

## 2025-06-11 PROCEDURE — 6360000002 HC RX W HCPCS: Performed by: EMERGENCY MEDICINE

## 2025-06-11 PROCEDURE — 6370000000 HC RX 637 (ALT 250 FOR IP)

## 2025-06-11 PROCEDURE — 97535 SELF CARE MNGMENT TRAINING: CPT

## 2025-06-11 PROCEDURE — 97530 THERAPEUTIC ACTIVITIES: CPT

## 2025-06-11 PROCEDURE — 1200000000 HC SEMI PRIVATE

## 2025-06-11 PROCEDURE — 2500000003 HC RX 250 WO HCPCS: Performed by: EMERGENCY MEDICINE

## 2025-06-11 RX ADMIN — ENTACAPONE 200 MG: 200 TABLET, FILM COATED ORAL at 14:05

## 2025-06-11 RX ADMIN — LATANOPROST 1 DROP: 50 SOLUTION OPHTHALMIC at 20:48

## 2025-06-11 RX ADMIN — ROPINIROLE HYDROCHLORIDE 0.5 MG: 0.25 TABLET, FILM COATED ORAL at 08:35

## 2025-06-11 RX ADMIN — GABAPENTIN 100 MG: 100 CAPSULE ORAL at 08:36

## 2025-06-11 RX ADMIN — ROPINIROLE HYDROCHLORIDE 0.5 MG: 0.25 TABLET, FILM COATED ORAL at 14:04

## 2025-06-11 RX ADMIN — CLOPIDOGREL BISULFATE 75 MG: 75 TABLET, FILM COATED ORAL at 08:36

## 2025-06-11 RX ADMIN — ACETAMINOPHEN 650 MG: 325 TABLET ORAL at 08:37

## 2025-06-11 RX ADMIN — ALLOPURINOL 100 MG: 100 TABLET ORAL at 08:40

## 2025-06-11 RX ADMIN — ROPINIROLE HYDROCHLORIDE 0.5 MG: 0.25 TABLET, FILM COATED ORAL at 20:47

## 2025-06-11 RX ADMIN — ACETAMINOPHEN 650 MG: 325 TABLET ORAL at 21:59

## 2025-06-11 RX ADMIN — CARBIDOPA AND LEVODOPA 2 TABLET: 25; 100 TABLET ORAL at 20:48

## 2025-06-11 RX ADMIN — SUCRALFATE 1 G: 1 TABLET ORAL at 00:08

## 2025-06-11 RX ADMIN — Medication 5 MG: at 21:59

## 2025-06-11 RX ADMIN — TAMSULOSIN HYDROCHLORIDE 0.4 MG: 0.4 CAPSULE ORAL at 08:37

## 2025-06-11 RX ADMIN — SUCRALFATE 1 G: 1 TABLET ORAL at 17:43

## 2025-06-11 RX ADMIN — SODIUM CHLORIDE, PRESERVATIVE FREE 10 ML: 5 INJECTION INTRAVENOUS at 20:48

## 2025-06-11 RX ADMIN — INSULIN LISPRO 3 UNITS: 100 INJECTION, SOLUTION INTRAVENOUS; SUBCUTANEOUS at 17:43

## 2025-06-11 RX ADMIN — CARBIDOPA AND LEVODOPA 2 TABLET: 25; 100 TABLET ORAL at 14:05

## 2025-06-11 RX ADMIN — ENTACAPONE 200 MG: 200 TABLET, FILM COATED ORAL at 20:48

## 2025-06-11 RX ADMIN — ENTACAPONE 200 MG: 200 TABLET, FILM COATED ORAL at 08:36

## 2025-06-11 RX ADMIN — INSULIN GLARGINE 8 UNITS: 100 INJECTION, SOLUTION SUBCUTANEOUS at 08:38

## 2025-06-11 RX ADMIN — ENOXAPARIN SODIUM 40 MG: 100 INJECTION SUBCUTANEOUS at 08:37

## 2025-06-11 RX ADMIN — ATORVASTATIN CALCIUM 40 MG: 40 TABLET, FILM COATED ORAL at 08:35

## 2025-06-11 RX ADMIN — SUCRALFATE 1 G: 1 TABLET ORAL at 12:32

## 2025-06-11 RX ADMIN — SUCRALFATE 1 G: 1 TABLET ORAL at 21:59

## 2025-06-11 RX ADMIN — ACETAMINOPHEN 650 MG: 325 TABLET ORAL at 03:42

## 2025-06-11 RX ADMIN — POLYETHYLENE GLYCOL 3350 17 G: 17 POWDER, FOR SOLUTION ORAL at 03:31

## 2025-06-11 RX ADMIN — SODIUM CHLORIDE, PRESERVATIVE FREE 10 ML: 5 INJECTION INTRAVENOUS at 08:40

## 2025-06-11 RX ADMIN — INSULIN LISPRO 3 UNITS: 100 INJECTION, SOLUTION INTRAVENOUS; SUBCUTANEOUS at 08:38

## 2025-06-11 RX ADMIN — POLYETHYLENE GLYCOL 3350 17 G: 17 POWDER, FOR SOLUTION ORAL at 17:52

## 2025-06-11 RX ADMIN — PANTOPRAZOLE SODIUM 20 MG: 20 TABLET, DELAYED RELEASE ORAL at 08:37

## 2025-06-11 RX ADMIN — SUCRALFATE 1 G: 1 TABLET ORAL at 05:41

## 2025-06-11 RX ADMIN — CARBIDOPA AND LEVODOPA 2 TABLET: 25; 100 TABLET ORAL at 08:36

## 2025-06-11 RX ADMIN — Medication 400 MG: at 08:36

## 2025-06-11 ASSESSMENT — PAIN DESCRIPTION - LOCATION: LOCATION: BACK

## 2025-06-11 ASSESSMENT — PAIN SCALES - GENERAL
PAINLEVEL_OUTOF10: 4
PAINLEVEL_OUTOF10: 1
PAINLEVEL_OUTOF10: 3
PAINLEVEL_OUTOF10: 2
PAINLEVEL_OUTOF10: 7

## 2025-06-11 ASSESSMENT — PAIN DESCRIPTION - DESCRIPTORS: DESCRIPTORS: ACHING

## 2025-06-11 ASSESSMENT — PAIN DESCRIPTION - ORIENTATION: ORIENTATION: MID

## 2025-06-11 NOTE — PROGRESS NOTES
OBS/CDU   Progress NOTE      Patients PCP is:  Tisha Glasgow MD        SUBJECTIVE      Patient without new complaint.  Patient with ongoing headache and ongoing tremor.  Difficulty with symptom relief and control due to Parkinson's.  Patient having trouble caring for self due to Parkinson's.  Patient requiring higher level of care than currently available at home    PHYSICAL EXAM      General: NAD, AO X 3  Heent: EOMI, PERRL  Neck: SUPPLE, NO JVD  Cardiovascular: RRR, S1S2  Pulmonary: CTAB, NO SOB  Abdomen: SOFT, NTTP, ND, +BS  Extremities: +2/4 PULSES DISTAL, NO SWELLING  Neuro / Psych: NO NUMBNESS OR TINGLING, MENTATION AT BASELINE    PERTINENT TEST /EXAMS      I have reviewed all available laboratory results.    MEDICATIONS CURRENT   insulin lispro (HUMALOG,ADMELOG) injection vial 3 Units, BID WC  insulin glargine (LANTUS) injection vial 8 Units, Daily  melatonin tablet 5 mg, Nightly PRN  glucose chewable tablet 16 g, PRN  dextrose bolus 10% 125 mL, PRN   Or  dextrose bolus 10% 250 mL, PRN  glucagon injection 1 mg, PRN  dextrose 10 % infusion, Continuous PRN  calcium carbonate (TUMS) chewable tablet 500 mg, TID PRN  nitroGLYCERIN (NITROSTAT) SL tablet 0.4 mg, Q5 Min PRN  clopidogrel (PLAVIX) tablet 75 mg, Daily  tamsulosin (FLOMAX) capsule 0.4 mg, Daily  atorvastatin (LIPITOR) tablet 40 mg, Daily  fluticasone (FLONASE) 50 MCG/ACT nasal spray 2 spray, Daily PRN  magnesium oxide (MAG-OX) tablet 400 mg, Daily  albuterol sulfate HFA (PROVENTIL;VENTOLIN;PROAIR) 108 (90 Base) MCG/ACT inhaler 2 puff, Q6H PRN  pantoprazole (PROTONIX) tablet 20 mg, Daily  allopurinol (ZYLOPRIM) tablet 100 mg, Daily  sucralfate (CARAFATE) tablet 1 g, 4 times per day  rOPINIRole (REQUIP) tablet 0.5 mg, TID  latanoprost (XALATAN) 0.005 % ophthalmic solution 1 drop, Nightly  lidocaine 4 % external patch 1 patch, Daily  furosemide (LASIX) tablet 20 mg, Daily PRN  gabapentin (NEURONTIN) capsule 100 mg, Daily  sodium chloride flush 0.9 %  injection 5-40 mL, 2 times per day  sodium chloride flush 0.9 % injection 5-40 mL, PRN  0.9 % sodium chloride infusion, PRN  potassium chloride (KLOR-CON M) extended release tablet 40 mEq, PRN   Or  potassium bicarb-citric acid (EFFER-K) effervescent tablet 40 mEq, PRN   Or  potassium chloride 10 mEq/100 mL IVPB (Peripheral Line), PRN  enoxaparin (LOVENOX) injection 40 mg, Daily  ondansetron (ZOFRAN) injection 4 mg, Q4H PRN  polyethylene glycol (GLYCOLAX) packet 17 g, Daily PRN  acetaminophen (TYLENOL) tablet 650 mg, Q6H PRN   Or  acetaminophen (TYLENOL) suppository 650 mg, Q6H PRN  entacapone (COMTAN) tablet 200 mg, TID   And  carbidopa-levodopa (SINEMET)  MG per tablet 2 tablet, TID        All medication charted and reviewed.    CONSULTS      IP CONSULT TO CASE MANAGEMENT  IP CONSULT TO VASCULAR ACCESS TEAM    ASSESSMENT/PLAN       Mina Gill is a 84 y.o. male who presents with outpatient treatment failure of care at home.  Patient requiring skilled nursing for ongoing Parkinson's and management of glucose.    Parkinson's  Stable but debilitating.  Patient with tremor interfering with all aspects of movement  No new complaints.  Patient has recently been seen by neurology with adjustments in medications  Hyperglycemia.  Hemoglobin A1c consistent with diabetes for some time.  Ongoing glucose monitoring  Insulin initiated  PT OT  Continued treatment  Will need to have more regular treatments at ECF  Placement  See case management notes  Ongoing process of preapproval.  Patient ready for ECF care  Continue home medications and pain control  Monitor vitals, labs, and imaging  DISPO: pending consults and clinical improvement    --  Frank Kirby MD  Observation Physician     This dictation was generated by voice recognition computer software.  Although all attempts are made to edit the dictation for accuracy, there may be errors in the transcription that are not intended.

## 2025-06-11 NOTE — PROGRESS NOTES
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  Past Surgical History:  has a past surgical history that includes Colonoscopy (2012); shoulder surgery (Right); pr colsc flx w/rmvl of tumor polyp lesion snare tq (N/A, 2017); Colonoscopy (2017); Cholecystectomy, laparoscopic (N/A, 10/29/2022); Esophagogastroduodenoscopy (2023); Upper gastrointestinal endoscopy (N/A, 2023); Upper gastrointestinal endoscopy (N/A, 2023); Cardiac procedure (N/A, 2024); Cardiac procedure (N/A, 2024); and ep device procedure (N/A, 2024).     Discharge Recommendations  Discharge Recommendations: Patient would benefit from continued therapy after discharge  Assessment  Performance deficits / Impairments: Decreased functional mobility ;Decreased ADL status;Decreased safe awareness;Decreased endurance;Decreased balance;Decreased strength  Assessment: Pt would benefit from continued acute care and post acute care OT to address above listed deficits.  Prognosis: Good  Activity Tolerance  Activity Tolerance: Patient Tolerated treatment well;Patient limited by fatigue  Safety Devices  Type of  Given To: Patient  Education Provided: Role of Therapy;Plan of Care;Precautions;ADL Adaptive Strategies;Transfer Training;Energy Conservation;IADL Safety;Equipment;Fall Prevention Strategies;Family Education  Education Method: Demonstration;Verbal  Barriers to Learning: Cognition  Education Outcome: Verbalized understanding;Demonstrated understanding;Continued education needed     Goals  Short Term Goals  Time Frame for Short Term Goals: Prior to discharge  Short Term Goal 1: Patient to demonstrate dyanmic sitting balance with modfied indep  Short Term Goal 2: Patient to demonstrate static/dynamic standing balance with contact guard assist with 1-2 hand support  Short Term Goal 3: Patient to perform functional mobility houseold  distances with LRAD with CGA  Short Term Goal 4: Patient to perform ther ex to improve ROM WFLs  Short Term Goal 5: Patient to demonstarte unsupported sitting/standing Grooming/UB ADLs with set up  Short Term Goal 6: Patient to demonstarte unsupported sitting/standing Toileting/LB ADLs with min assist     Plan  Occupational Therapy Plan  Times Per Week: 3-5x/wk  Current Treatment Recommendations: Strengthening, Balance training, Functional mobility training, Endurance training, Neuromuscular re-education, Cognitive reorientation, Pain management, Safety education & training, Patient/Caregiver education & training, Equipment evaluation, education, & procurement, Self-Care / ADL, Co-Treatment, ROM     Minutes  OT Individual Minutes  Time In: 0910  Time Out: 1020  Minutes: 70  Time Code Minutes   Timed Code Treatment Minutes: 70 Minutes     Electronically signed by TRANG PRADHAN on 6/11/25 at 10:21 AM EDT

## 2025-06-11 NOTE — PLAN OF CARE
Problem: Discharge Planning  Goal: Discharge to home or other facility with appropriate resources  6/11/2025 0233 by Neo Varghese RN  Outcome: Progressing  6/10/2025 1629 by Danay Squires RN  Outcome: Progressing     Problem: Pain  Goal: Verbalizes/displays adequate comfort level or baseline comfort level  6/11/2025 0233 by Neo Varghese RN  Outcome: Progressing  6/10/2025 1629 by Danay Squires RN  Outcome: Progressing     Problem: Safety - Adult  Goal: Free from fall injury  6/11/2025 0233 by Neo Varghese RN  Outcome: Progressing  6/10/2025 1629 by Danay Squires RN  Outcome: Progressing     Problem: Skin/Tissue Integrity  Goal: Skin integrity remains intact  Description: 1.  Monitor for areas of redness and/or skin breakdown2.  Assess vascular access sites hourly3.  Every 4-6 hours minimum:  Change oxygen saturation probe site4.  Every 4-6 hours:  If on nasal continuous positive airway pressure, respiratory therapy assess nares and determine need for appliance change or resting period  6/11/2025 0233 by Neo Varghese RN  Outcome: Progressing  6/10/2025 1629 by Danay Squires RN  Outcome: Progressing     Problem: Chronic Conditions and Co-morbidities  Goal: Patient's chronic conditions and co-morbidity symptoms are monitored and maintained or improved  6/11/2025 0233 by Neo Varghese RN  Outcome: Progressing  6/10/2025 1629 by Danay Squires RN  Outcome: Progressing     Problem: ABCDS Injury Assessment  Goal: Absence of physical injury  6/11/2025 0233 by Neo Varghese RN  Outcome: Progressing  6/10/2025 1629 by Danay Squires RN  Outcome: Progressing     Problem: Self Harm/Suicidality  Goal: Will have no self-injury during hospital stay  Description: INTERVENTIONS:1.  Ensure constant observer at bedside with Q15M safety checks2.  Maintain a safe environment3.  Secure patient belongings4.  Ensure family/visitors adhere to safety recommendations5.  Ensure safety tray has been added to patient's diet  order6.  Every shift and PRN: Re-assess suicidal risk via Frequent Screener  6/11/2025 0233 by Neo Varghese, RN  Outcome: Progressing  6/10/2025 1629 by Danay Squires, RN  Outcome: Progressing

## 2025-06-11 NOTE — PROGRESS NOTES
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  Past Surgical History:  has a past surgical history that includes Colonoscopy (2012); shoulder surgery (Right); pr colsc flx w/rmvl of tumor polyp lesion snare tq (N/A, 2017); Colonoscopy (2017); Cholecystectomy, laparoscopic (N/A, 10/29/2022); Esophagogastroduodenoscopy (2023); Upper gastrointestinal endoscopy (N/A, 2023); Upper gastrointestinal endoscopy (N/A, 2023); Cardiac procedure (N/A, 2024); Cardiac procedure (N/A, 2024); and ep device procedure (N/A, 2024).    Discharge Recommendations  Discharge Recommendations: Patient would benefit from continued therapy after discharge  PT Equipment Recommendations  Equipment Needed: No  Mobility Devices: Walker  Walker: Rolling  Other: pt has SPC in room, based on mobility this date pt should use RW    Assessment  Body Structures, Functions, Activity Limitations Requiring Skilled Therapeutic Intervention: Decreased functional mobility , Decreased ADL status, Decreased body mechanics, Decreased safe awareness, Decreased strength, Decreased coordination, Decreased high-level IADLs,  Home exercise program, Therapeutic activities    Goals  Short Term Goals  Time Frame for Short Term Goals: 14 visits  Short Term Goal 1: Complete transfers with least restrictive AD and mod I  Short Term Goal 2: Complete 300 ft of gait with least restrictive AD and mod I  Short Term Goal 3: Complete 6 stairs with one handrail and mod I  Short Term Goal 4: Participate in 30 minutes of therapy to promote endurance    Minutes  PT Individual Minutes  Time In: 1420  Time Out: 1444  Minutes: 24  Time Code Minutes  Timed Code Treatment Minutes: 23 Minutes    Electronically signed by Seng Jackson PT on 6/11/25 at 3:02 PM EDT

## 2025-06-11 NOTE — PLAN OF CARE
Problem: Discharge Planning  Goal: Discharge to home or other facility with appropriate resources  Outcome: Progressing     Problem: Pain  Goal: Verbalizes/displays adequate comfort level or baseline comfort level  Outcome: Progressing     Problem: Safety - Adult  Goal: Free from fall injury  Outcome: Progressing     Problem: Skin/Tissue Integrity  Goal: Skin integrity remains intact  Description: 1.  Monitor for areas of redness and/or skin breakdown2.  Assess vascular access sites hourly3.  Every 4-6 hours minimum:  Change oxygen saturation probe site4.  Every 4-6 hours:  If on nasal continuous positive airway pressure, respiratory therapy assess nares and determine need for appliance change or resting period  Outcome: Progressing     Problem: Chronic Conditions and Co-morbidities  Goal: Patient's chronic conditions and co-morbidity symptoms are monitored and maintained or improved  Outcome: Progressing     Problem: ABCDS Injury Assessment  Goal: Absence of physical injury  Outcome: Progressing     Problem: Self Harm/Suicidality  Goal: Will have no self-injury during hospital stay  Description: INTERVENTIONS:1.  Ensure constant observer at bedside with Q15M safety checks2.  Maintain a safe environment3.  Secure patient belongings4.  Ensure family/visitors adhere to safety recommendations5.  Ensure safety tray has been added to patient's diet order6.  Every shift and PRN: Re-assess suicidal risk via Frequent Screener  Outcome: Progressing

## 2025-06-12 ENCOUNTER — APPOINTMENT (OUTPATIENT)
Dept: GENERAL RADIOLOGY | Age: 85
DRG: 057 | End: 2025-06-12
Payer: COMMERCIAL

## 2025-06-12 VITALS
WEIGHT: 180 LBS | RESPIRATION RATE: 19 BRPM | HEART RATE: 70 BPM | SYSTOLIC BLOOD PRESSURE: 108 MMHG | OXYGEN SATURATION: 98 % | BODY MASS INDEX: 28.25 KG/M2 | HEIGHT: 67 IN | TEMPERATURE: 97.3 F | DIASTOLIC BLOOD PRESSURE: 58 MMHG

## 2025-06-12 LAB
GLUCOSE BLD-MCNC: 137 MG/DL (ref 75–110)
GLUCOSE BLD-MCNC: 141 MG/DL (ref 75–110)
GLUCOSE BLD-MCNC: 258 MG/DL (ref 75–110)

## 2025-06-12 PROCEDURE — 6370000000 HC RX 637 (ALT 250 FOR IP)

## 2025-06-12 PROCEDURE — 2500000003 HC RX 250 WO HCPCS: Performed by: EMERGENCY MEDICINE

## 2025-06-12 PROCEDURE — 6370000000 HC RX 637 (ALT 250 FOR IP): Performed by: EMERGENCY MEDICINE

## 2025-06-12 PROCEDURE — 6360000002 HC RX W HCPCS: Performed by: EMERGENCY MEDICINE

## 2025-06-12 PROCEDURE — 74018 RADEX ABDOMEN 1 VIEW: CPT

## 2025-06-12 PROCEDURE — 82947 ASSAY GLUCOSE BLOOD QUANT: CPT

## 2025-06-12 RX ORDER — ALBUTEROL SULFATE 90 UG/1
2 INHALANT RESPIRATORY (INHALATION) EVERY 6 HOURS PRN
Qty: 18 G | Refills: 0 | Status: SHIPPED | OUTPATIENT
Start: 2025-06-12

## 2025-06-12 RX ORDER — CARBIDOPA, LEVODOPA AND ENTACAPONE 50; 200; 200 MG/1; MG/1; MG/1
1 TABLET, FILM COATED ORAL 3 TIMES DAILY
Qty: 90 TABLET | Refills: 0 | Status: SHIPPED | OUTPATIENT
Start: 2025-06-12

## 2025-06-12 RX ORDER — SENNOSIDES 8.6 MG/1
1 TABLET ORAL DAILY
Status: DISCONTINUED | OUTPATIENT
Start: 2025-06-12 | End: 2025-06-12 | Stop reason: HOSPADM

## 2025-06-12 RX ORDER — TAMSULOSIN HYDROCHLORIDE 0.4 MG/1
0.4 CAPSULE ORAL DAILY
Qty: 30 CAPSULE | Refills: 0 | Status: SHIPPED | OUTPATIENT
Start: 2025-06-12

## 2025-06-12 RX ORDER — FUROSEMIDE 20 MG/1
20 TABLET ORAL DAILY PRN
Qty: 30 TABLET | Refills: 0 | Status: SHIPPED | OUTPATIENT
Start: 2025-06-12 | End: 2025-07-12

## 2025-06-12 RX ORDER — SUCRALFATE 1 G/1
1 TABLET ORAL 4 TIMES DAILY
Qty: 120 TABLET | Refills: 0 | Status: SHIPPED | OUTPATIENT
Start: 2025-06-12

## 2025-06-12 RX ORDER — SODIUM PHOSPHATE, DIBASIC AND SODIUM PHOSPHATE, MONOBASIC 7; 19 G/230ML; G/230ML
1 ENEMA RECTAL ONCE
Status: DISCONTINUED | OUTPATIENT
Start: 2025-06-12 | End: 2025-06-12 | Stop reason: HOSPADM

## 2025-06-12 RX ORDER — NITROGLYCERIN 0.4 MG/1
TABLET SUBLINGUAL
Qty: 25 TABLET | Refills: 0 | Status: SHIPPED | OUTPATIENT
Start: 2025-06-12

## 2025-06-12 RX ORDER — LANOLIN ALCOHOL/MO/W.PET/CERES
400 CREAM (GRAM) TOPICAL DAILY
Qty: 30 TABLET | Refills: 0 | Status: SHIPPED | OUTPATIENT
Start: 2025-06-12

## 2025-06-12 RX ORDER — PANTOPRAZOLE SODIUM 20 MG/1
20 TABLET, DELAYED RELEASE ORAL DAILY
Qty: 30 TABLET | Refills: 0 | Status: SHIPPED | OUTPATIENT
Start: 2025-06-12

## 2025-06-12 RX ORDER — ROPINIROLE 0.5 MG/1
0.5 TABLET, FILM COATED ORAL 3 TIMES DAILY
Qty: 90 TABLET | Refills: 0 | Status: SHIPPED | OUTPATIENT
Start: 2025-06-12

## 2025-06-12 RX ADMIN — CARBIDOPA AND LEVODOPA 2 TABLET: 25; 100 TABLET ORAL at 08:29

## 2025-06-12 RX ADMIN — INSULIN GLARGINE 8 UNITS: 100 INJECTION, SOLUTION SUBCUTANEOUS at 08:37

## 2025-06-12 RX ADMIN — ENOXAPARIN SODIUM 40 MG: 100 INJECTION SUBCUTANEOUS at 08:29

## 2025-06-12 RX ADMIN — ROPINIROLE HYDROCHLORIDE 0.5 MG: 0.25 TABLET, FILM COATED ORAL at 08:29

## 2025-06-12 RX ADMIN — ENTACAPONE 200 MG: 200 TABLET, FILM COATED ORAL at 08:29

## 2025-06-12 RX ADMIN — ENTACAPONE 200 MG: 200 TABLET, FILM COATED ORAL at 13:43

## 2025-06-12 RX ADMIN — SUCRALFATE 1 G: 1 TABLET ORAL at 05:13

## 2025-06-12 RX ADMIN — ATORVASTATIN CALCIUM 40 MG: 40 TABLET, FILM COATED ORAL at 08:29

## 2025-06-12 RX ADMIN — CARBIDOPA AND LEVODOPA 2 TABLET: 25; 100 TABLET ORAL at 13:43

## 2025-06-12 RX ADMIN — ROPINIROLE HYDROCHLORIDE 0.5 MG: 0.25 TABLET, FILM COATED ORAL at 13:42

## 2025-06-12 RX ADMIN — CLOPIDOGREL BISULFATE 75 MG: 75 TABLET, FILM COATED ORAL at 08:29

## 2025-06-12 RX ADMIN — TAMSULOSIN HYDROCHLORIDE 0.4 MG: 0.4 CAPSULE ORAL at 08:29

## 2025-06-12 RX ADMIN — GABAPENTIN 100 MG: 100 CAPSULE ORAL at 08:29

## 2025-06-12 RX ADMIN — Medication 400 MG: at 08:29

## 2025-06-12 RX ADMIN — ALLOPURINOL 100 MG: 100 TABLET ORAL at 10:45

## 2025-06-12 RX ADMIN — SUCRALFATE 1 G: 1 TABLET ORAL at 10:45

## 2025-06-12 RX ADMIN — SENNOSIDES 8.6 MG: 8.6 TABLET, FILM COATED ORAL at 13:42

## 2025-06-12 RX ADMIN — SODIUM CHLORIDE, PRESERVATIVE FREE 10 ML: 5 INJECTION INTRAVENOUS at 08:30

## 2025-06-12 RX ADMIN — PANTOPRAZOLE SODIUM 20 MG: 20 TABLET, DELAYED RELEASE ORAL at 08:29

## 2025-06-12 NOTE — PROGRESS NOTES
[facility]  CDU / OBSERVATION eNCOUnter  Attending NOte       I performed a history and physical examination of the patient and discussed management with the resident.  This patient was placed in the observation unit for reevaluation for possible admission to the hospital. I reviewed the resident’s note and agree with the documented findings and plan of care. Any areas of disagreement are noted on the chart. I was personally present for the key portions of any procedures. I have documented in the chart those procedures where I was not present during the key portions. I have reviewed the nurses notes. I agree with the chief complaint, past medical history, past surgical history, allergies, medications, social and family history as documented unless otherwise noted below.    The patient was placed in the observation unit for reevaluation for possible admission to the hospital.      The Family history, social history, and ROS are effectively unchanged since admission unless noted elsewhere in the chart.    Patient reporting constipation. Plan for an enema today. No other issues. Pending placement.     Jesika Jacob MD  Attending Emergency  Physician

## 2025-06-12 NOTE — PLAN OF CARE
Problem: Discharge Planning  Goal: Discharge to home or other facility with appropriate resources  6/12/2025 1650 by Gita Walker RN  Outcome: Completed  6/12/2025 0433 by Lorin Vidal RN  Outcome: Progressing     Problem: Pain  Goal: Verbalizes/displays adequate comfort level or baseline comfort level  6/12/2025 1650 by Gita Walker RN  Outcome: Completed  6/12/2025 0433 by Lorin Vidal RN  Outcome: Progressing     Problem: Safety - Adult  Goal: Free from fall injury  6/12/2025 1650 by Gita Walker RN  Outcome: Completed  6/12/2025 0433 by Lorin Vidal RN  Outcome: Progressing     Problem: Skin/Tissue Integrity  Goal: Skin integrity remains intact  Description: 1.  Monitor for areas of redness and/or skin breakdown2.  Assess vascular access sites hourly3.  Every 4-6 hours minimum:  Change oxygen saturation probe site4.  Every 4-6 hours:  If on nasal continuous positive airway pressure, respiratory therapy assess nares and determine need for appliance change or resting period  6/12/2025 1650 by Gita Walker RN  Outcome: Completed  6/12/2025 0433 by Lorin Vidal RN  Outcome: Progressing     Problem: Chronic Conditions and Co-morbidities  Goal: Patient's chronic conditions and co-morbidity symptoms are monitored and maintained or improved  6/12/2025 1650 by Gita Walker RN  Outcome: Completed  6/12/2025 0433 by Lorin Vidal RN  Outcome: Progressing     Problem: ABCDS Injury Assessment  Goal: Absence of physical injury  6/12/2025 1650 by Gita Walker RN  Outcome: Completed  6/12/2025 0433 by Lorin Vidal RN  Outcome: Progressing     Problem: Self Harm/Suicidality  Goal: Will have no self-injury during hospital stay  Description: INTERVENTIONS:1.  Ensure constant observer at bedside with Q15M safety checks2.  Maintain a safe environment3.  Secure patient belongings4.  Ensure family/visitors adhere to safety recommendations5.  Ensure safety tray has been added to  patient's diet order6.  Every shift and PRN: Re-assess suicidal risk via Frequent Screener  6/12/2025 1650 by Gita Walker, RN  Outcome: Completed  6/12/2025 0433 by Lorin Vidal, RN  Outcome: Progressing

## 2025-06-12 NOTE — CARE COORDINATION
Discharge Report    [facility]  Clinical Case Management Department  Written by: Bel Burns RN    Patient Name: Mina Gill  Attending Provider: Frank Kirby MD  Admit Date: 2025 12:15 PM  MRN: [id number]  Account: [id number]                     : 1940  Discharge Date: 2025      Disposition: home    Bel Burns RN

## 2025-06-12 NOTE — PLAN OF CARE
Problem: Discharge Planning  Goal: Discharge to home or other facility with appropriate resources  6/12/2025 0433 by Lorin Vidal RN  Outcome: Progressing  6/11/2025 1725 by Lora Fuentes RN  Outcome: Progressing     Problem: Pain  Goal: Verbalizes/displays adequate comfort level or baseline comfort level  6/12/2025 0433 by Lorin Vidal RN  Outcome: Progressing  6/11/2025 1725 by Lora Fuentes RN  Outcome: Progressing     Problem: Safety - Adult  Goal: Free from fall injury  6/12/2025 0433 by Lorin Vidal RN  Outcome: Progressing  6/11/2025 1725 by Lora Fuentes RN  Outcome: Progressing     Problem: Skin/Tissue Integrity  Goal: Skin integrity remains intact  Description: 1.  Monitor for areas of redness and/or skin breakdown2.  Assess vascular access sites hourly3.  Every 4-6 hours minimum:  Change oxygen saturation probe site4.  Every 4-6 hours:  If on nasal continuous positive airway pressure, respiratory therapy assess nares and determine need for appliance change or resting period  6/12/2025 0433 by Lorin Vidal RN  Outcome: Progressing  6/11/2025 1725 by Lora Fuentes RN  Outcome: Progressing     Problem: Chronic Conditions and Co-morbidities  Goal: Patient's chronic conditions and co-morbidity symptoms are monitored and maintained or improved  6/12/2025 0433 by Lorin Vidal RN  Outcome: Progressing  6/11/2025 1725 by Lora Fuentes RN  Outcome: Progressing     Problem: ABCDS Injury Assessment  Goal: Absence of physical injury  6/12/2025 0433 by Lorin Vidal RN  Outcome: Progressing  6/11/2025 1725 by Lora Fuentes RN  Outcome: Progressing     Problem: Self Harm/Suicidality  Goal: Will have no self-injury during hospital stay  Description: INTERVENTIONS:1.  Ensure constant observer at bedside with Q15M safety checks2.  Maintain a safe environment3.  Secure patient belongings4.  Ensure family/visitors adhere to safety recommendations5.  Ensure safety

## 2025-06-12 NOTE — CARE COORDINATION
Case Management   Daily Progress Note       Patient Name: Mina Gill                   YOB: 1940  Diagnosis: Generalized weakness [R53.1]  Parkinson disease with dyskinesia (HCC) [G20.B1]  Hyperglycemia due to diabetes mellitus (HCC) [E11.65]                       GMLOS: 3.8 days  Length of Stay: 10  days    Anticipated Discharge Date: Longer than expected LOS    Readmission Risk (Low < 19, Mod (19-27), High > 27): Readmission Risk Score: 25.3        Current Transitional Plan    [] Home Independently    [] Home with HC    [] Skilled Nursing Facility    [] Acute Rehabilitation    [] Long Term Acute Care (LTAC)    [] Other:     Plan for the Stay (Medical Management) :          Workflow Continuation (Additional Notes) : Left message for son/SONYA Hargrove requesting a return call. Notified of denial for SNF level of care and discharge order. Call back number provided.    5472 Phone call from son. Discussed SNF denial and discharge order. He would like a referral to [facility]. Referral sent. He states that his sister will be here soon and will bring father home. He asked if discharge paperwork can be printed in both Faroese and english-Gita BERNARD notified.    1455 Yara with [facility] on unit. They will accept patient.    1503 PS message to OBS resident on call requesting C order.        Bel Burns RN  June 12, 2025

## 2025-06-12 NOTE — DISCHARGE SUMMARY
Went over discharge instructions with patient and daughter. Patient and daughter verbalized understanding, and all questions were answered.  called son and explained SNF denial. Family and pt. Agreeable to [facility] and taking care of pt at home. Daughter said had no home medications at home, and that she was unable to get his home medications. Candance, NP, sent medications, daughter to , also stated to follow up with primary doctor due to blood sugar concerns and hgba1c, pt and daughter agreeable to plan. Educated on the importance of fall prevention and safety at home, per daughter she lives with pt and helps care for patient. Patient dressed, all belongings with patient, wheeled downstairs to be transported home by daughterAlejandro

## 2025-06-12 NOTE — PROGRESS NOTES
OBS/CDU   Progress NOTE      Patients PCP is:  Tisha Glasgow MD        SUBJECTIVE      No acute overnight events. He feels nausea and did not pass stool for last 4 days. He denies chest pain or vomiting.     PHYSICAL EXAM      General: NAD, AO X 3  Heent: EOMI, PERRL  Neck: SUPPLE, NO JVD  Cardiovascular: RRR, S1S2  Pulmonary: CTAB, NO SOB  Abdomen: SOFT, NTTP, ND, +BS  Extremities: +2/4 PULSES DISTAL, NO SWELLING  Neuro / Psych: NO NUMBNESS OR TINGLING, MENTATION AT BASELINE    PERTINENT TEST /EXAMS      I have reviewed all available laboratory results.    MEDICATIONS CURRENT   sodium phosphate (FLEET) rectal enema 1 enema, Once  senna (SENOKOT) tablet 8.6 mg, Daily  insulin lispro (HUMALOG,ADMELOG) injection vial 3 Units, BID WC  insulin glargine (LANTUS) injection vial 8 Units, Daily  melatonin tablet 5 mg, Nightly PRN  glucose chewable tablet 16 g, PRN  dextrose bolus 10% 125 mL, PRN   Or  dextrose bolus 10% 250 mL, PRN  glucagon injection 1 mg, PRN  dextrose 10 % infusion, Continuous PRN  calcium carbonate (TUMS) chewable tablet 500 mg, TID PRN  nitroGLYCERIN (NITROSTAT) SL tablet 0.4 mg, Q5 Min PRN  clopidogrel (PLAVIX) tablet 75 mg, Daily  tamsulosin (FLOMAX) capsule 0.4 mg, Daily  atorvastatin (LIPITOR) tablet 40 mg, Daily  fluticasone (FLONASE) 50 MCG/ACT nasal spray 2 spray, Daily PRN  magnesium oxide (MAG-OX) tablet 400 mg, Daily  albuterol sulfate HFA (PROVENTIL;VENTOLIN;PROAIR) 108 (90 Base) MCG/ACT inhaler 2 puff, Q6H PRN  pantoprazole (PROTONIX) tablet 20 mg, Daily  allopurinol (ZYLOPRIM) tablet 100 mg, Daily  sucralfate (CARAFATE) tablet 1 g, 4 times per day  rOPINIRole (REQUIP) tablet 0.5 mg, TID  latanoprost (XALATAN) 0.005 % ophthalmic solution 1 drop, Nightly  lidocaine 4 % external patch 1 patch, Daily  furosemide (LASIX) tablet 20 mg, Daily PRN  gabapentin (NEURONTIN) capsule 100 mg, Daily  sodium chloride flush 0.9 % injection 5-40 mL, 2 times per day  sodium chloride flush 0.9 % injection

## 2025-06-19 NOTE — DISCHARGE SUMMARY
CDU Discharge Summary        Patient:  Mina Gill  YOB: 1940    MRN: [id number]   Acct: [id number]    Primary Care Physician: Tisha Glasgow MD    Admit date:  5/29/2025 12:15 PM  Discharge date: 6/12/2025  4:58 PM      Discharge Diagnoses:     1.)  Patient had generalized weakness with subacute onset due to parkinsonism.  Treated with supportive therapy.  Patient's symptoms are persistent with the plan to discharge with home care    2.  Newly diagnosed diabetes.  Uncertain onset.  Treated with insulin      Follow-up:  Call today/tomorrow for a follow up appointment with Tisha Glasgow MD , or return to the Emergency Room with worsening symptoms    Stressed to patient the importance of following up with primary care doctor for further workup/management of symptoms.  Pt verbalizes understanding and agrees with plan.    Discharge Medication Changes:       Medication List        CHANGE how you take these medications      allopurinol 100 MG tablet  Commonly known as: ZYLOPRIM  Take 1 tablet by mouth daily  What changed: See the new instructions.     carbidopa-levodopa-entacapone -200 MG Tabs per tablet  Commonly known as: STALEVO 200  Take 1 tablet by mouth 3 times daily  What changed: See the new instructions.     clopidogrel 75 MG tablet  Commonly known as: PLAVIX  Take 1 tablet by mouth daily  What changed:   how much to take  how to take this  when to take this  additional instructions     gabapentin 100 MG capsule  Commonly known as: NEURONTIN  Take 1 capsule by mouth 3 times daily for 60 days.  What changed: See the new instructions.     latanoprost 0.005 % ophthalmic solution  Commonly known as: XALATAN  Place 1 drop into both eyes nightly  What changed: See the new instructions.     rOPINIRole 0.5 MG tablet  Commonly known as: REQUIP  Take 1 tablet by mouth 3 times daily  What changed: See the new instructions.     sucralfate 1 GM tablet  Commonly known as: CARAFATE  Take 1 tablet by

## 2025-06-27 ENCOUNTER — TELEPHONE (OUTPATIENT)
Age: 85
End: 2025-06-27

## 2025-06-27 NOTE — TELEPHONE ENCOUNTER
Patient was seen in the hospital last month for high blood sugars, was told to call the doctor for a prescription for Metformin, please advise.

## 2025-07-03 ENCOUNTER — OFFICE VISIT (OUTPATIENT)
Age: 85
End: 2025-07-03

## 2025-07-03 VITALS
HEART RATE: 84 BPM | SYSTOLIC BLOOD PRESSURE: 117 MMHG | WEIGHT: 180.2 LBS | DIASTOLIC BLOOD PRESSURE: 73 MMHG | BODY MASS INDEX: 28.22 KG/M2

## 2025-07-03 DIAGNOSIS — G20.A1 PARKINSON'S DISEASE WITHOUT FLUCTUATING MANIFESTATIONS, UNSPECIFIED WHETHER DYSKINESIA PRESENT (HCC): ICD-10-CM

## 2025-07-03 DIAGNOSIS — K59.00 CONSTIPATION, UNSPECIFIED CONSTIPATION TYPE: ICD-10-CM

## 2025-07-03 DIAGNOSIS — G44.329 CHRONIC POST-TRAUMATIC HEADACHE, NOT INTRACTABLE: Primary | ICD-10-CM

## 2025-07-03 DIAGNOSIS — H04.123 DRY EYES: ICD-10-CM

## 2025-07-03 DIAGNOSIS — E11.22 TYPE 2 DIABETES MELLITUS WITH DIABETIC CHRONIC KIDNEY DISEASE, UNSPECIFIED CKD STAGE, UNSPECIFIED WHETHER LONG TERM INSULIN USE (HCC): ICD-10-CM

## 2025-07-03 RX ORDER — SENNA AND DOCUSATE SODIUM 50; 8.6 MG/1; MG/1
1 TABLET, FILM COATED ORAL DAILY
Qty: 90 TABLET | Refills: 3 | Status: SHIPPED | OUTPATIENT
Start: 2025-07-03

## 2025-07-03 RX ORDER — LATANOPROST 50 UG/ML
1 SOLUTION/ DROPS OPHTHALMIC NIGHTLY
Qty: 7.5 ML | Refills: 5 | Status: SHIPPED | OUTPATIENT
Start: 2025-07-03 | End: 2025-07-03

## 2025-07-03 RX ORDER — RIMEGEPANT SULFATE 75 MG/75MG
TABLET, ORALLY DISINTEGRATING ORAL
Qty: 14 TABLET | Refills: 0 | Status: SHIPPED | OUTPATIENT
Start: 2025-07-03

## 2025-07-03 RX ORDER — DIPHENHYDRAMINE HCL 25 MG
1 CAPSULE ORAL PRN
Qty: 30 ML | Refills: 5 | Status: SHIPPED | OUTPATIENT
Start: 2025-07-03

## 2025-07-03 ASSESSMENT — ENCOUNTER SYMPTOMS
SHORTNESS OF BREATH: 0
CONSTIPATION: 1
ABDOMINAL PAIN: 0
COUGH: 0
ABDOMINAL DISTENTION: 0

## 2025-07-03 NOTE — PATIENT INSTRUCTIONS
Address: [address] [place] [address] [address], [place] [address]  Phone: [phone]

## 2025-07-03 NOTE — PROGRESS NOTES
Visit Information    Have you changed or started any medications since your last visit including any over-the-counter medicines, vitamins, or herbal medicines? yes    Have you stopped taking any of your medications? Is so, why? -  no  Are you having any side effects from any of your medications? - yes constipation    Have you seen any other physician or provider since your last visit?  no   Have you had any other diagnostic tests since your last visit?  no   Have you been seen in the emergency room and/or had an admission in a hospital since we last saw you?  yes    Have you had your routine dental cleaning in the past 6 months?  no     Do you have an active MyChart account? If no, what is the barrier?  No    Patient Care Team:  Tisha Glasgow MD as PCP - General (Family Medicine)  Maria Elena Farooq MD (Neurology)  Felisa John MD (Nephrology)  Bo Lindsey MD as Consulting Physician (Cardiovascular Disease)  Ross Garcia MD as Consulting Physician (Gastroenterology)  Slime Baker APRN - CNP as Nurse Practitioner (Nurse Practitioner)  Chuy Bailey MD as Consulting Physician (Pain Management)  Ramon Milan APRN - CNP as Nurse Practitioner (Pulmonology)    Medical History Review  Past Medical, Family, and Social History reviewed and does not contribute to the patient presenting condition    Health Maintenance   Topic Date Due    Shingles vaccine (1 of 2) Never done    Respiratory Syncytial Virus (RSV) Pregnant or age 60 yrs+ (1 - 1-dose 75+ series) Never done    Hepatitis B vaccine (2 of 3 - 19+ 3-dose series) 12/14/2023    COVID-19 Vaccine (6 - 2024-25 season) 09/01/2024    Annual Wellness Visit (Medicare)  06/12/2025    Flu vaccine (1) 08/01/2025    Depression Monitoring  03/17/2026    Colorectal Cancer Screen  09/19/2027    DTaP/Tdap/Td vaccine (4 - Td or Tdap) 11/16/2033    Pneumococcal 50+ years Vaccine  Completed    Hepatitis A vaccine  Aged Out    Hib vaccine  Aged Out

## 2025-07-03 NOTE — PROGRESS NOTES
Attending Physician Statement  I  have discussed the care of Mina Gill including pertinent history and exam findings with the resident. I agree with the assessment, plan and orders as documented by the resident.      /73 (BP Site: Right Upper Arm, Patient Position: Sitting, BP Cuff Size: Medium Adult)   Pulse 84   Wt 81.7 kg (180 lb 3.2 oz)   BMI 28.22 kg/m²    BP Readings from Last 3 Encounters:   07/03/25 117/73   06/12/25 (!) 108/58   05/26/25 (!) 124/90     Wt Readings from Last 3 Encounters:   07/03/25 81.7 kg (180 lb 3.2 oz)   05/29/25 81.6 kg (180 lb)   05/26/25 81.6 kg (180 lb)          Diagnosis Orders   1. Chronic post-traumatic headache, not intractable  rimegepant sulfate (NURTEC) 75 MG TBDP      2. Constipation, unspecified constipation type  sennosides-docusate sodium (SENOKOT-S) 8.6-50 MG tablet      3. Type 2 diabetes mellitus with diabetic chronic kidney disease, unspecified CKD stage, unspecified whether long term insulin use (HCC)  empagliflozin (JARDIANCE) 10 MG tablet      4. Parkinson's disease without fluctuating manifestations, unspecified whether dyskinesia present ([facility])        5. Dry eyes                Chris Love DO 7/3/2025 4:19 PM

## 2025-07-03 NOTE — PROGRESS NOTES
Subjective:    Mina Gill is a 84 y.o. 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    Presented to the office today for:  Chief Complaint   Patient presents with    Follow-Up from Hospital    Fatigue     Patient states he has been feeling fatigue for the past few weeks    Constipation     Patient complains of being constipated as a side effect to a medication he was given, but he is not sure which one       HPI    Patient is a 54-year-old male with a past medical history of Parkinson’s disease, hypertension (HTN), bilateral knee arthritis, and third-degree heart block status post (s/p) pacemaker. He presented to the clinic for evaluation of uncontrolled diabetes, constipation, headache, and dry eyes.  His last HbA1c on 06/02/2025 was 8.6%. The patient reported consistently high fingerstick glucose readings. He has not taken any diabetes medication since his last hospital visit.  He is currently taking Milk of Magnesia for constipation, but reports no improvement. He also complained of severe headaches, primarily occurring at night.  The patient was seen in the [place] ED on 05/26/2025 for generalized weakness with a subacute onset, attributed to parkinsonism. He was treated with supportive therapy.  Chest X-ray showed no acute cardiopulmonary process. A nuclear stress test performed on 05/03/2025 was normal. A CT chest with contrast showed no evidence of pulmonary embolism, but did reveal

## 2025-07-09 ENCOUNTER — TELEPHONE (OUTPATIENT)
Age: 85
End: 2025-07-09

## 2025-07-09 NOTE — TELEPHONE ENCOUNTER
Mina Gill (Key: [id number])     Nurtec 75MG dispersible tablets       Approved on July 7 by [employer] 2017  Approved. This drug has been approved under the Member's Medicare Part D benefit. Approved quantity: 14 units per 30 day(s). You may fill up to a 90 day supply except for those on Specialty Tier 5, which can be filled up to a 30 day supply. Please call the pharmacy to process the prescription claim.  Effective Date: 6/23/2025

## 2025-07-18 ENCOUNTER — TELEPHONE (OUTPATIENT)
Age: 85
End: 2025-07-18

## 2025-07-18 NOTE — TELEPHONE ENCOUNTER
Chay called from [facility] requesting a generic form of the Nurtec to be sent to the pharmacy due to the insurance not covering the Nurtec, Chay stated this medication was $1000.00.

## 2025-07-24 NOTE — TELEPHONE ENCOUNTER
Chay called again regarding this, stating that patient has started having migraines again and needs something sent in ASAP. Patient cannot afford the out of pocket costs for the Nurtec and needs either a generic form or an alternative.    Chay: [phone]

## 2025-07-25 DIAGNOSIS — G43.909 MIGRAINE WITHOUT STATUS MIGRAINOSUS, NOT INTRACTABLE, UNSPECIFIED MIGRAINE TYPE: Primary | ICD-10-CM

## 2025-07-25 RX ORDER — SUMATRIPTAN SUCCINATE 25 MG/1
TABLET ORAL
Qty: 30 TABLET | Refills: 3 | Status: SHIPPED | OUTPATIENT
Start: 2025-07-25

## 2025-07-25 NOTE — PROGRESS NOTES
Patient is 84-year-old male significant past medical history of Parkinson's disease contacted clinic due to worsening migraines.  Patient was previously on Nurtec as per neurology but due to his co-pay unable to afford it we will give Imitrex patient blood pressure unremarkable with no acute history of CAD versus CVA but likely patient will be educated to go to the emergency room if symptoms or not improved with Imitrex.

## 2025-08-02 DIAGNOSIS — E11.22 TYPE 2 DIABETES MELLITUS WITH DIABETIC CHRONIC KIDNEY DISEASE, UNSPECIFIED CKD STAGE, UNSPECIFIED WHETHER LONG TERM INSULIN USE (HCC): ICD-10-CM

## 2025-08-04 ENCOUNTER — OFFICE VISIT (OUTPATIENT)
Dept: NEUROLOGY | Age: 85
End: 2025-08-04

## 2025-08-04 VITALS
HEART RATE: 70 BPM | BODY MASS INDEX: 28.25 KG/M2 | OXYGEN SATURATION: 97 % | DIASTOLIC BLOOD PRESSURE: 119 MMHG | WEIGHT: 180 LBS | SYSTOLIC BLOOD PRESSURE: 139 MMHG | HEIGHT: 67 IN

## 2025-08-04 DIAGNOSIS — G20.A1 PARKINSON'S DISEASE WITHOUT FLUCTUATING MANIFESTATIONS, UNSPECIFIED WHETHER DYSKINESIA PRESENT (HCC): ICD-10-CM

## 2025-08-04 DIAGNOSIS — G44.329 CHRONIC POST-TRAUMATIC HEADACHE, NOT INTRACTABLE: ICD-10-CM

## 2025-08-04 DIAGNOSIS — G43.009 MIGRAINE WITHOUT AURA AND WITHOUT STATUS MIGRAINOSUS, NOT INTRACTABLE: ICD-10-CM

## 2025-08-04 DIAGNOSIS — G25.2 RESTING TREMOR: ICD-10-CM

## 2025-08-04 DIAGNOSIS — R51.9 ACUTE NONINTRACTABLE HEADACHE, UNSPECIFIED HEADACHE TYPE: Primary | ICD-10-CM

## 2025-08-04 RX ORDER — UBROGEPANT 50 MG/1
50 TABLET ORAL SEE ADMIN INSTRUCTIONS
Qty: 16 TABLET | Refills: 4 | Status: SHIPPED | OUTPATIENT
Start: 2025-08-04

## 2025-08-04 RX ORDER — AMLODIPINE BESYLATE 5 MG/1
5 TABLET ORAL DAILY
COMMUNITY
Start: 2025-07-31

## 2025-08-04 RX ORDER — ISOSORBIDE MONONITRATE 30 MG/1
TABLET, EXTENDED RELEASE ORAL
COMMUNITY
Start: 2025-07-31

## 2025-08-04 RX ORDER — ATORVASTATIN CALCIUM 40 MG/1
40 TABLET, FILM COATED ORAL DAILY
COMMUNITY
Start: 2025-07-31

## 2025-08-04 RX ORDER — EMPAGLIFLOZIN 10 MG/1
10 TABLET, FILM COATED ORAL DAILY
Qty: 30 TABLET | Refills: 0 | Status: SHIPPED | OUTPATIENT
Start: 2025-08-04 | End: 2025-08-08 | Stop reason: SDUPTHER

## 2025-08-05 ENCOUNTER — TELEPHONE (OUTPATIENT)
Dept: NEUROLOGY | Age: 85
End: 2025-08-05

## 2025-08-08 ENCOUNTER — OFFICE VISIT (OUTPATIENT)
Age: 85
End: 2025-08-08

## 2025-08-08 VITALS
SYSTOLIC BLOOD PRESSURE: 117 MMHG | WEIGHT: 186 LBS | BODY MASS INDEX: 29.12 KG/M2 | DIASTOLIC BLOOD PRESSURE: 72 MMHG | TEMPERATURE: 96.9 F | HEART RATE: 71 BPM

## 2025-08-08 DIAGNOSIS — G20.A1 PARKINSON'S DISEASE WITHOUT FLUCTUATING MANIFESTATIONS, UNSPECIFIED WHETHER DYSKINESIA PRESENT (HCC): Primary | ICD-10-CM

## 2025-08-08 DIAGNOSIS — Z13.220 SCREENING FOR HYPERLIPIDEMIA: ICD-10-CM

## 2025-08-08 DIAGNOSIS — E11.22 TYPE 2 DIABETES MELLITUS WITH DIABETIC CHRONIC KIDNEY DISEASE, UNSPECIFIED CKD STAGE, UNSPECIFIED WHETHER LONG TERM INSULIN USE (HCC): ICD-10-CM

## 2025-08-08 RX ORDER — FUROSEMIDE 20 MG/1
20 TABLET ORAL DAILY PRN
Qty: 30 TABLET | Refills: 0 | Status: SHIPPED | OUTPATIENT
Start: 2025-08-08 | End: 2025-09-07

## 2025-08-08 RX ORDER — ALBUTEROL SULFATE 90 UG/1
2 INHALANT RESPIRATORY (INHALATION) EVERY 6 HOURS PRN
Qty: 18 G | Refills: 0 | Status: SHIPPED | OUTPATIENT
Start: 2025-08-08

## 2025-08-08 RX ORDER — GABAPENTIN 100 MG/1
200 CAPSULE ORAL 3 TIMES DAILY
Qty: 360 CAPSULE | Refills: 0 | Status: SHIPPED | OUTPATIENT
Start: 2025-08-08 | End: 2025-10-07

## 2025-08-08 RX ORDER — SUCRALFATE 1 G/1
1 TABLET ORAL 4 TIMES DAILY
Qty: 120 TABLET | Refills: 0 | Status: SHIPPED | OUTPATIENT
Start: 2025-08-08

## 2025-08-08 ASSESSMENT — ENCOUNTER SYMPTOMS
DIARRHEA: 0
RECTAL PAIN: 0
COUGH: 0
NAUSEA: 0
CHOKING: 0

## 2025-08-19 ENCOUNTER — TELEPHONE (OUTPATIENT)
Age: 85
End: 2025-08-19

## 2025-08-19 DIAGNOSIS — Z87.828 HISTORY OF TRAUMATIC INJURY OF HEAD: Primary | ICD-10-CM

## (undated) DEVICE — BAG DRNGE 9OZ L9.5IN BILE W/ ADPT TWO ADJ RUB BELT DISP FOR

## (undated) DEVICE — GLOVE ORANGE PI 7   MSG9070

## (undated) DEVICE — ELECTRODE LAP L36CM PTFE WIRE J HK CLEANCOAT

## (undated) DEVICE — Device

## (undated) DEVICE — AGENT HEMSTAT 3GM OXIDIZED REGENERATED CELOS ABSRB FOR CONT (ORDER MULTIPLES OF 5EA)

## (undated) DEVICE — GLOVE SURG SZ 65 THK91MIL LTX FREE SYN POLYISOPRENE

## (undated) DEVICE — GOWN,AURORA,NONREINFORCED,LARGE: Brand: MEDLINE

## (undated) DEVICE — SNARE ENDOSCP L240CM LOOP W13MM DIA2.4MM SHT THROW SM OVL

## (undated) DEVICE — INTRODUCER SHTH 6FR L11CM 0.038IN STD SIDEPRT EXTN 3 W

## (undated) DEVICE — ADHESIVE SKIN CLOSURE TOP 36 CC HI VISC DERMBND MINI

## (undated) DEVICE — DEFENDO AIR WATER SUCTION AND BIOPSY VALVE KIT FOR  OLYMPUS: Brand: DEFENDO AIR/WATER/SUCTION AND BIOPSY VALVE

## (undated) DEVICE — DEVICE TRCR 12X9X3IN WHT CLSR DISP OMNICLOSE

## (undated) DEVICE — SURGICAL PROCEDURE TRAY CRD CATH SVMMC

## (undated) DEVICE — FORCEPS BX L240CM WRK CHN 2.8MM STD CAP W/ NDL MIC MESH

## (undated) DEVICE — SYRINGE, LUER LOCK, 10ML: Brand: MEDLINE

## (undated) DEVICE — TROCAR: Brand: KII® SLEEVE

## (undated) DEVICE — TOWEL,OR,DSP,ST,NATURAL,DLX,4/PK,20PK/CS: Brand: MEDLINE

## (undated) DEVICE — AIRLIFE™ NASAL OXYGEN CANNULA CURVED, FLARED TIP, WITH 7 FEET (2.1 M) CRUSH RESISTANT TUBING, OVER-THE-EAR STYLE: Brand: AIRLIFE™

## (undated) DEVICE — SUTURE VCRL + SZ 0 L27IN ABSRB VLT L26MM UR-6 5/8 CIR VCP603H

## (undated) DEVICE — TISSUE RETRIEVAL SYSTEM: Brand: INZII RETRIEVAL SYSTEM

## (undated) DEVICE — JELLY,LUBE,STERILE,FLIP TOP,TUBE,2-OZ: Brand: MEDLINE

## (undated) DEVICE — SOLUTION ANTIFOG VIS SYS CLEARIFY LAPSCP

## (undated) DEVICE — APPLIER CLP M L L11.4IN DIA10MM ENDOSCP ROT MULT FOR LIG

## (undated) DEVICE — ANGIOGRAPHIC CATHETER: Brand: EXPO™

## (undated) DEVICE — TROCAR: Brand: KII FIOS FIRST ENTRY

## (undated) DEVICE — SURGICEL ENDOSCP APPL

## (undated) DEVICE — POLYP TRAP: Brand: TRAPEASE®

## (undated) DEVICE — Device: Brand: SPOT ENDOSOPIC MARKER

## (undated) DEVICE — GUIDEWIRE 35/260/FC/PTFE/3J: Brand: GUIDEWIRE

## (undated) DEVICE — FIAPC® PROBE W/ FILTER 2200 A OD 2.3MM/6.9FR; L 2.2M/7.2FT: Brand: ERBE

## (undated) DEVICE — DISSECTOR LAP DIA5MM BLNT TIP ENDOPATH

## (undated) DEVICE — INTRODUCER SHTH AD L12CM DIA6FR 0.032IN CV PERM VLV NO RADPQ

## (undated) DEVICE — STRAP ARMBRD W1.5XL32IN FOAM STR YET SFT W/ HK AND LOOP

## (undated) DEVICE — BITEBLOCK 54FR W/ DENT RIM BLOX

## (undated) DEVICE — ENDO KIT W/SYRINGE: Brand: MEDLINE INDUSTRIES, INC.

## (undated) DEVICE — GLOVE ORANGE PI 8   MSG9080

## (undated) DEVICE — SCISSOR SURG METZ CRV TIP

## (undated) DEVICE — APPLICATOR MEDICATED 26 CC SOLUTION HI LT ORNG CHLORAPREP

## (undated) DEVICE — STRIP,CLOSURE,WOUND,MEDI-STRIP,1/2X4: Brand: MEDLINE

## (undated) DEVICE — PACK LAP BASIC

## (undated) DEVICE — NEEDLE SCLERO 25GA L240CM OD0.51MM ID0.24MM EXTN L4MM SHTH

## (undated) DEVICE — INSUFFLATION NEEDLE TO ESTABLISH PNEUMOPERITONEUM.: Brand: INSUFFLATION NEEDLE